# Patient Record
Sex: FEMALE | Race: BLACK OR AFRICAN AMERICAN | Employment: OTHER | URBAN - NONMETROPOLITAN AREA
[De-identification: names, ages, dates, MRNs, and addresses within clinical notes are randomized per-mention and may not be internally consistent; named-entity substitution may affect disease eponyms.]

---

## 2020-07-23 ENCOUNTER — TELEPHONE (OUTPATIENT)
Dept: FAMILY MEDICINE CLINIC | Age: 65
End: 2020-07-23

## 2020-07-23 DIAGNOSIS — D50.9 IRON DEFICIENCY ANEMIA, UNSPECIFIED IRON DEFICIENCY ANEMIA TYPE: ICD-10-CM

## 2020-07-23 DIAGNOSIS — Z79.01 ANTICOAGULANT LONG-TERM USE: Primary | ICD-10-CM

## 2020-07-30 DIAGNOSIS — Z79.01 ANTICOAGULANT LONG-TERM USE: ICD-10-CM

## 2020-07-30 DIAGNOSIS — D50.9 IRON DEFICIENCY ANEMIA, UNSPECIFIED IRON DEFICIENCY ANEMIA TYPE: ICD-10-CM

## 2020-08-06 ENCOUNTER — VIRTUAL VISIT (OUTPATIENT)
Dept: FAMILY MEDICINE CLINIC | Age: 65
End: 2020-08-06
Payer: MEDICARE

## 2020-08-06 DIAGNOSIS — J01.01 ACUTE RECURRENT MAXILLARY SINUSITIS: Primary | ICD-10-CM

## 2020-08-06 PROCEDURE — 99442 PR PHYS/QHP TELEPHONE EVALUATION 11-20 MIN: CPT | Performed by: FAMILY MEDICINE

## 2020-08-06 RX ORDER — AMIODARONE HYDROCHLORIDE 200 MG/1
200 TABLET ORAL DAILY
COMMUNITY
Start: 2019-02-22 | End: 2021-01-13

## 2020-08-06 NOTE — PROGRESS NOTES
Christiano Ernst presents today for   Chief Complaint   Patient presents with    Sinus Infection         Depression Screening:  No flowsheet data found. Learning Assessment:  Learning Assessment 4/17/2013   PRIMARY LEARNER Patient   BARRIERS PRIMARY LEARNER NONE   PRIMARY LANGUAGE ENGLISH   LEARNER PREFERENCE PRIMARY DEMONSTRATION   RELATIONSHIP SELF       Abuse Screening:  No flowsheet data found. Fall Risk  No flowsheet data found. Health Maintenance reviewed and discussed and ordered per Provider. Health Maintenance Due   Topic Date Due    Hepatitis C Screening  1955    Lipid Screen  07/03/1967    DTaP/Tdap/Td series (1 - Tdap) 07/03/1978    PAP AKA CERVICAL CYTOLOGY  07/03/1978    Shingrix Vaccine Age 50> (1 of 2) 07/03/2007    Breast Cancer Screen Mammogram  07/03/2007    FOBT Q1Y Age 50-75  07/03/2007    Pneumococcal 0-64 years (3 of 3 - PCV13) 11/13/2017    Medicare Yearly Exam  05/13/2020    Influenza Age 9 to Adult  08/01/2020   . Coordination of Care:  1. Have you been to the ER, urgent care clinic since your last visit? Hospitalized since your last visit? no    2. Have you seen or consulted any other health care providers outside of the [address] [place] [address] since your last visit? Include any pap smears or colon screening.  no      Last UDS Checked no  Last Pain contract signed: no

## 2020-08-06 NOTE — PROGRESS NOTES
Christiano Ersnt is a 63 y.o. female, evaluated via audio-only technology on 8/6/2020 for Sinus Infection  . Assessment & Plan:   Sinus infection:Zithromax will be provided with warm mist inhalations. No xray at telma time    12  Subjective:    is a 44-year-old female. The patient is a 44-year-old female who is seen today for evaluation. She is seen via telephone the patient is a 44-year-old female seen for evaluation today. she has nasal congertion with green drainage and a bad taste in the sputum. No cocumented fever treated I think for h pylori in the hospital. 15 min visit pt at home I was in my office    Prior to Admission medications    Medication Sig Start Date End Date Taking? Authorizing Provider   amiodarone (CORDARONE) 200 mg tablet Take 200 mg by mouth daily. 2/22/19  Yes Provider, Historical   ESTRACE 0.01 % (0.1 mg/gram) vaginal cream INSERT 2 GRAMS INTO VAGINA EVERY MONDAY AND THURSDAY 4/11/17  Yes Noni Morris MD   metoprolol tartrate (LOPRESSOR) 50 mg tablet TK 1 T PO BID UTD 1/28/17  Yes Provider, Historical   LORazepam (ATIVAN) 0.5 mg tablet 0.5 mg.   Yes Provider, Historical   dicyclomine (BENTYL) 10 mg capsule 10 mg. 1/17/14  Yes Provider, Historical   sodium bicarbonate 650 mg tablet TK 2 TS PO BID 12/21/16  Yes Provider, Historical   fluticasone (FLONASE) 50 mcg/actuation nasal spray 1 Spray. Yes Provider, Historical   calcitRIOL (ROCALTROL) 0.25 mcg capsule TAKE ONE CAPSULE PO QD 11/17/16  Yes Provider, Historical   EPINEPHrine (EPIPEN) 0.3 mg/0.3 mL injection 0.3 mg. 10/1/16  Yes Provider, Historical   ondansetron hcl (ZOFRAN) 8 mg tablet TK 1 T PO  Q 8 H PRN FOR 5 DAYS 12/21/16  Yes Provider, Historical   amLODIPine (NORVASC) 10 mg tablet TK 1 T PO QD 1/28/17  Yes Provider, Historical   metroNIDAZOLE (FLAGYL) 500 mg tablet TK 1 T PO  TID 12/15/16  Yes Provider, Historical   predniSONE (DELTASONE) 10 mg tablet Take  by mouth every fourty-eight (48) hours.    Yes Provider, Historical budesonide (PULMICORT) 0.5 mg/2 mL nebulizer suspension 500 mcg by Nebulization route two (2) times a day. Yes Provider, Historical   albuterol (PROVENTIL VENTOLIN) 2.5 mg /3 mL (0.083 %) nebulizer solution by Nebulization route as needed. Yes Provider, Historical   calcium carbonate (OS-CONNIE) 500 mg calcium (1,250 mg) tablet Take  by mouth two (2) times a day. Yes Provider, Historical   simvastatin (ZOCOR) 20 mg tablet Take  by mouth nightly. Yes Provider, Historical   azelastine (OPTIVAR) 0.05 % ophthalmic solution Administer  to both eyes two (2) times a day. Use in affected eye(s)    Yes Provider, Historical   omeprazole (PRILOSEC) 20 mg capsule Take 20 mg by mouth daily. Yes Provider, Historical   predniSONE (DELTASONE) 5 mg tablet  1/12/17   Provider, Historical   cranberry extract 425 mg cap 425 mg. 1/17/14   Provider, Historical   GENGRAF 25 mg capsule TK 4 CS QAM AND 3 CS QPM 1/16/17   Provider, Historical   mycophenolate mofetil (CELLCEPT) 250 mg capsule TK 4 CS PO BID 11/28/16   Provider, Historical   cloNIDine (CATAPRES) 0.2 mg/24 hr patch UNW AND FRENCH 1 PA TRANSDERMALLY ONCE A WEEK 1/11/17   Provider, Historical   verapamil ER (CALAN-SR) 180 mg CR tablet TK 1 T PO  QD 1/31/17   Provider, Historical   fluconazole (DIFLUCAN) 100 mg tablet TK 1 T PO  QD AROUNT THE CLOCK FOR 5 DAYS 12/8/16   Provider, Historical   levoFLOXacin (LEVAQUIN) 500 mg tablet TK 1 T PO QD 12/15/16   Provider, Historical   zolpidem (AMBIEN) 5 mg tablet TK 1 T PO QD HS 12/21/16   Provider, Historical   amoxicillin-clavulanate (AUGMENTIN) 875-125 mg per tablet TK 1 T PO  Q 12 H X 14 DAYS 1/23/17   Provider, Historical   VICODIN 5-300 mg tablet TK 1 T PO  Q 6 H PRN 2/4/17   Provider, Historical   nystatin (MYCOSTATIN) 100,000 unit/mL suspension TK 5 ML PO QID FOR 10 DAYS 12/8/16   Provider, Historical   Psyllium Husk-Aspartame (METAMUCIL FIBER SINGLES) 3.4 gram PwPk Take  by mouth daily.     Provider, Historical cycloSPORINE (SANDIMMUNE) 25 mg capsule Take 25 mg by mouth two (2) times a day. Provider, Historical   hyoscyamine SL (LEVSIN/SL) 0.125 mg SL tablet 0.125 mg by SubLINGual route every four (4) hours as needed. Provider, Historical   cyanocobalamin (VITAMIN B-12) 1,000 mcg tablet Take 1,000 mcg by mouth daily. Provider, Historical   fluticasone-salmeterol (ADVAIR DISKUS) 250-50 mcg/dose diskus inhaler Take 1 Puff by inhalation every twelve (12) hours. Provider, Historical   Fluticasone Furoate 27.5 mcg/actuation nasal spray 2 Sprays by Nasal route daily. Provider, Historical   montelukast (SINGULAIR) 10 mg tablet Take 10 mg by mouth daily. Provider, Historical   metoprolol (LOPRESSOR) 100 mg tablet Take  by mouth two (2) times a day. Provider, Historical     Patient Active Problem List   Diagnosis Code    History of kidney transplant Z94.0    CMV (cytomegalovirus) antibody positive R89.4    Hematuria R31.9    Fecal incontinence R15.9    Drug-induced hyperglycemia R73.9, T50.905A    Diverticulitis of intestine K57.92    Anemia D64.9    Acute renal failure ([facility] [address] N17.9    Asthma J45.909    Exacerbation of asthma J45. 0    Chronic kidney disease, stage III (moderate) (HCC) N18.3    Chronic obstructive pulmonary disease (HCC) J44.9    Cystic disease of kidney Q61.9    Diverticular disease of large intestine K57.30    Essential hypertension I10    Fever R50.9    Gastroenteritis K52.9    Gastroesophageal reflux disease K21.9    Hay fever J30.1    Hyperlipidemia E78.5    Hyperkalemia E87.5    Disease due to gram-negative bacillus B96.89    Kidney transplant rejection T86.11    Migraine without status migrainosus, not intractable G43.909    Nausea and vomiting R11.2    Adiposity E66.9    Pruritus L29.9    Recurrent urinary tract infection N39.0    Urinary tract infection N39.0    Renal transplant disorder T86.10    Systemic infection ([address] A41.9    Systemic inflammatory response syndrome (SIRS) ([facility]) R65.10    Ulcer [id number]    Renal cyst N28.1    Obesity E66.9    Migraine G43.909    Hypertension I10    Hypercholesteremia E78.00    GERD (gastroesophageal reflux disease) K21.9    Burning with urination R30.0    Arrhythmia I49.9       Review of Systems   Constitutional: Positive for malaise/fatigue. HENT: Positive for congestion and sinus pain. Eyes: Negative. Respiratory: Negative. Cardiovascular: Negative. Gastrointestinal: Negative. Genitourinary: Negative. Musculoskeletal: Positive for joint pain. Skin: Negative for itching and rash. Endo/Heme/Allergies: Negative. Patient-Reported Vitals 8/6/2020   Patient-Reported Weight 150   Patient-Reported Height 4'4\"        Lenny Russell, who was evaluated through a patient-initiated, synchronous (real-time) audio only encounter, and/or her healthcare decision maker, is aware that it is a billable service, with coverage as determined by her insurance carrier. She provided verbal consent to proceed: n/a- consent obtained within past 12 months. She has not had a related appointment within my department in the past 7 days or scheduled within the next 24 hours.       Total Time: minutes: 11-20 minutes    Mona Rivera MD

## 2020-08-07 ENCOUNTER — TELEPHONE (OUTPATIENT)
Dept: FAMILY MEDICINE CLINIC | Age: 65
End: 2020-08-07

## 2020-08-07 DIAGNOSIS — J01.11 ACUTE RECURRENT FRONTAL SINUSITIS: Primary | ICD-10-CM

## 2020-08-07 RX ORDER — AZITHROMYCIN 250 MG/1
TABLET, FILM COATED ORAL
Qty: 6 TAB | Refills: 0 | Status: SHIPPED | OUTPATIENT
Start: 2020-08-07 | End: 2020-08-12

## 2020-08-10 LAB — CREATININE, EXTERNAL: 7.7

## 2020-08-23 RX ORDER — SIMVASTATIN 20 MG/1
TABLET, FILM COATED ORAL
Qty: 90 TAB | Refills: 1 | Status: SHIPPED | OUTPATIENT
Start: 2020-08-23 | End: 2021-02-19

## 2020-08-28 DIAGNOSIS — N39.0 URINARY TRACT INFECTION WITHOUT HEMATURIA, SITE UNSPECIFIED: ICD-10-CM

## 2020-08-28 DIAGNOSIS — N30.00 ACUTE CYSTITIS WITHOUT HEMATURIA: ICD-10-CM

## 2020-08-28 DIAGNOSIS — N39.0 URINARY TRACT INFECTION WITHOUT HEMATURIA, SITE UNSPECIFIED: Primary | ICD-10-CM

## 2020-08-28 RX ORDER — NITROFURANTOIN 25; 75 MG/1; MG/1
100 CAPSULE ORAL 2 TIMES DAILY
Qty: 10 CAP | Refills: 0 | Status: SHIPPED | OUTPATIENT
Start: 2020-08-28 | End: 2020-09-17

## 2020-09-03 DIAGNOSIS — N30.00 ACUTE CYSTITIS WITHOUT HEMATURIA: ICD-10-CM

## 2020-09-17 ENCOUNTER — APPOINTMENT (OUTPATIENT)
Dept: GENERAL RADIOLOGY | Age: 65
End: 2020-09-17
Attending: EMERGENCY MEDICINE
Payer: MEDICARE

## 2020-09-17 ENCOUNTER — HOSPITAL ENCOUNTER (EMERGENCY)
Age: 65
Discharge: HOME | End: 2020-09-17
Attending: EMERGENCY MEDICINE
Payer: MEDICARE

## 2020-09-17 VITALS
RESPIRATION RATE: 17 BRPM | BODY MASS INDEX: 30.91 KG/M2 | DIASTOLIC BLOOD PRESSURE: 80 MMHG | TEMPERATURE: 99.6 F | HEART RATE: 75 BPM | SYSTOLIC BLOOD PRESSURE: 175 MMHG | WEIGHT: 168 LBS | OXYGEN SATURATION: 98 % | HEIGHT: 62 IN

## 2020-09-17 DIAGNOSIS — K21.9 GASTROESOPHAGEAL REFLUX DISEASE, ESOPHAGITIS PRESENCE NOT SPECIFIED: Primary | ICD-10-CM

## 2020-09-17 LAB
ALBUMIN SERPL-MCNC: 3.5 G/DL (ref 3.5–4.7)
ALBUMIN/GLOB SERPL: 1 {RATIO}
ALP SERPL-CCNC: 81 U/L (ref 38–126)
ALT SERPL-CCNC: 13 U/L (ref 3–52)
ANION GAP SERPL CALC-SCNC: 10 MMOL/L
AST SERPL W P-5'-P-CCNC: 18 U/L (ref 14–74)
ATRIAL RATE: 79 BPM
BASOPHILS # BLD: 0 K/UL
BASOPHILS NFR BLD: 0 %
BILIRUB SERPL-MCNC: 0.4 MG/DL (ref 0.2–1)
BUN SERPL-MCNC: 16 MG/DL (ref 9–21)
BUN/CREAT SERPL: 5
CA-I BLD-MCNC: 7.2 MG/DL (ref 8.5–10.5)
CALCULATED P AXIS, ECG09: 45 DEGREES
CALCULATED R AXIS, ECG10: 58 DEGREES
CALCULATED T AXIS, ECG11: 47 DEGREES
CHLORIDE SERPL-SCNC: 97 MMOL/L (ref 94–111)
CO2 SERPL-SCNC: 30 MMOL/L (ref 21–33)
CREAT SERPL-MCNC: 3.3 MG/DL (ref 0.7–1.2)
DIAGNOSIS, 93000: NORMAL
EOSINOPHIL # BLD: 0 K/UL
EOSINOPHIL NFR BLD: 0 %
ERYTHROCYTE [DISTWIDTH] IN BLOOD BY AUTOMATED COUNT: 15.9 % (ref 11.6–14.5)
GLOBULIN SER CALC-MCNC: 3.6 G/DL
GLUCOSE SERPL-MCNC: 114 MG/DL (ref 70–110)
HCT VFR BLD AUTO: 32.5 % (ref 35–45)
HGB BLD-MCNC: 9.9 % (ref 12–16)
IMM GRANULOCYTES # BLD AUTO: 0 K/UL
IMM GRANULOCYTES NFR BLD AUTO: 0 %
LIPASE SERPL-CCNC: 37 U/L (ref 10–57)
LYMPHOCYTES # BLD: 0.7 K/UL
LYMPHOCYTES NFR BLD: 12 %
MAGNESIUM SERPL-MCNC: 1.9 MG/DL (ref 1.7–2.8)
MCH RBC QN AUTO: 33.9 PG (ref 24–34)
MCHC RBC AUTO-ENTMCNC: 30.5 G/DL (ref 31–37)
MCV RBC AUTO: 111.3 FL (ref 74–97)
MONOCYTES # BLD: 0.5 K/UL
MONOCYTES NFR BLD: 8 %
NEUTS SEG # BLD: 4.7 K/UL
NEUTS SEG NFR BLD: 80 %
P-R INTERVAL, ECG05: 178 MS
PLATELET # BLD AUTO: 209 K/UL (ref 135–420)
PMV BLD AUTO: 10.2 FL
POTASSIUM SERPL-SCNC: 3.9 MMOL/L (ref 3.2–5.1)
PROT SERPL-MCNC: 7.1 G/DL (ref 6.1–8.4)
Q-T INTERVAL, ECG07: 453 MS
QRS DURATION, ECG06: 96 MS
QTC CALCULATION (BEZET), ECG08: 520 MS
RBC # BLD AUTO: 2.92 M/UL (ref 4.2–5.3)
RBC MORPH BLD: ABNORMAL
SODIUM SERPL-SCNC: 137 MMOL/L (ref 135–145)
TROPONIN I SERPL-MCNC: 0.04 NG/ML (ref 0.02–0.05)
TROPONIN I SERPL-MCNC: 0.04 NG/ML (ref 0.02–0.05)
VENTRICULAR RATE, ECG03: 79 BPM
WBC # BLD AUTO: 5.9 K/UL (ref 4.6–13.2)

## 2020-09-17 PROCEDURE — 96375 TX/PRO/DX INJ NEW DRUG ADDON: CPT

## 2020-09-17 PROCEDURE — 74011000250 HC RX REV CODE- 250: Performed by: EMERGENCY MEDICINE

## 2020-09-17 PROCEDURE — 83735 ASSAY OF MAGNESIUM: CPT

## 2020-09-17 PROCEDURE — 74022 RADEX COMPL AQT ABD SERIES: CPT

## 2020-09-17 PROCEDURE — 84484 ASSAY OF TROPONIN QUANT: CPT

## 2020-09-17 PROCEDURE — 85025 COMPLETE CBC W/AUTO DIFF WBC: CPT

## 2020-09-17 PROCEDURE — 74011250636 HC RX REV CODE- 250/636: Performed by: EMERGENCY MEDICINE

## 2020-09-17 PROCEDURE — 80053 COMPREHEN METABOLIC PANEL: CPT

## 2020-09-17 PROCEDURE — 96374 THER/PROPH/DIAG INJ IV PUSH: CPT

## 2020-09-17 PROCEDURE — 99285 EMERGENCY DEPT VISIT HI MDM: CPT

## 2020-09-17 PROCEDURE — 74011250637 HC RX REV CODE- 250/637

## 2020-09-17 PROCEDURE — 36415 COLL VENOUS BLD VENIPUNCTURE: CPT

## 2020-09-17 PROCEDURE — 83690 ASSAY OF LIPASE: CPT

## 2020-09-17 PROCEDURE — 93005 ELECTROCARDIOGRAM TRACING: CPT

## 2020-09-17 RX ORDER — ALUMINA, MAGNESIA, AND SIMETHICONE 2400; 2400; 240 MG/30ML; MG/30ML; MG/30ML
10 SUSPENSION ORAL
Qty: 250 ML | Refills: 0 | Status: SHIPPED | OUTPATIENT
Start: 2020-09-17

## 2020-09-17 RX ORDER — METOPROLOL TARTRATE 50 MG/1
50 TABLET ORAL DAILY
Status: ON HOLD | COMMUNITY
Start: 2019-02-22 | End: 2021-08-10

## 2020-09-17 RX ORDER — LIDOCAINE HYDROCHLORIDE 20 MG/ML
15 SOLUTION OROPHARYNGEAL
Status: COMPLETED | OUTPATIENT
Start: 2020-09-17 | End: 2020-09-17

## 2020-09-17 RX ORDER — OMEPRAZOLE 20 MG/1
20 CAPSULE, DELAYED RELEASE ORAL DAILY
Qty: 30 CAP | Refills: 0 | Status: SHIPPED | OUTPATIENT
Start: 2020-09-17 | End: 2020-09-22

## 2020-09-17 RX ORDER — MAG HYDROX/ALUMINUM HYD/SIMETH 200-200-20
SUSPENSION, ORAL (FINAL DOSE FORM) ORAL
Status: COMPLETED
Start: 2020-09-17 | End: 2020-09-17

## 2020-09-17 RX ORDER — ONDANSETRON 2 MG/ML
4 INJECTION INTRAMUSCULAR; INTRAVENOUS
Status: COMPLETED | OUTPATIENT
Start: 2020-09-17 | End: 2020-09-17

## 2020-09-17 RX ADMIN — FAMOTIDINE 20 MG: 10 INJECTION, SOLUTION INTRAVENOUS at 04:16

## 2020-09-17 RX ADMIN — LIDOCAINE HYDROCHLORIDE 15 ML: 20 SOLUTION ORAL; TOPICAL at 04:16

## 2020-09-17 RX ADMIN — ALUMINUM HYDROXIDE, MAGNESIUM HYDROXIDE, AND SIMETHICONE 30 ML: 200; 200; 20 SUSPENSION ORAL at 04:16

## 2020-09-17 RX ADMIN — ONDANSETRON 4 MG: 2 INJECTION INTRAMUSCULAR; INTRAVENOUS at 04:17

## 2020-09-17 NOTE — ED PROVIDER NOTES
EMERGENCY DEPARTMENT HISTORY AND PHYSICAL EXAM      Date: 9/17/2020  Patient Name: Ho Beard    History of Presenting Illness     Chief Complaint   Patient presents with    Chest Pain    Shortness of Breath       History Provided By: Patient    HPI: Ho Beard, 63 y.o. female with a past medical history significant 4 GERD, asthma,'s disease, arrhythmias presents to the ED with cc of Substernal burning chest pain for the past several hours. Patient states she had a full dialysis session today but during that session she began with epigastric burning pain radiating into her chest.  No nausea no vomiting. She did note some pain in her mid back. As well. No fevers no chills. No cough or congestion. No recent illness otherwise. There are no other complaints, changes, or physical findings at this time. PCP: Erin Mars MD    Current Outpatient Medications   Medication Sig Dispense Refill    metoprolol tartrate (LOPRESSOR) 50 mg tablet Take 50 mg by mouth daily.  aluminum & magnesium hydroxide-simethicone (Maalox Maximum Strength) 400-400-40 mg/5 mL suspension Take 10 mL by mouth every six (6) hours as needed for Indigestion. 250 mL 0    omeprazole (PriLOSEC) 20 mg capsule Take 1 Cap by mouth daily. 30 Cap 0    simvastatin (ZOCOR) 20 mg tablet TAKE 1 TABLET BY MOUTH DAILY AS DIRECTED. 90 Tab 1    amiodarone (CORDARONE) 200 mg tablet Take 200 mg by mouth daily.  cranberry extract 425 mg cap 425 mg.      fluticasone (FLONASE) 50 mcg/actuation nasal spray 1 Spray.  amLODIPine (NORVASC) 10 mg tablet TK 1 T PO QD  3    albuterol (PROVENTIL VENTOLIN) 2.5 mg /3 mL (0.083 %) nebulizer solution by Nebulization route as needed.  fluticasone-salmeterol (ADVAIR DISKUS) 250-50 mcg/dose diskus inhaler Take 1 Puff by inhalation every twelve (12) hours.  montelukast (SINGULAIR) 10 mg tablet Take 10 mg by mouth daily.         ESTRACE 0.01 % (0.1 mg/gram) vaginal cream INSERT 2 GRAMS INTO VAGINA EVERY MONDAY AND THURSDAY 42.5 g 5    LORazepam (ATIVAN) 0.5 mg tablet 0.5 mg.      dicyclomine (BENTYL) 10 mg capsule 10 mg.      EPINEPHrine (EPIPEN) 0.3 mg/0.3 mL injection 0.3 mg.      hyoscyamine SL (LEVSIN/SL) 0.125 mg SL tablet 0.125 mg by SubLINGual route every four (4) hours as needed. Past History     Past Medical History:  Past Medical History:   Diagnosis Date    Anemia NEC     Arrhythmia     Asthma     Asthma     Burning with urination     frequent uti    CMV (cytomegalovirus) antibody positive     Dyspepsia and other specified disorders of function of stomach     stomach ulcer    Essential hypertension     GERD (gastroesophageal reflux disease)     Hypercholesteremia     Hypertension     Migraine     Obesity     Renal cyst 2002    kidney transplant     Ulcer        Past Surgical History:  Past Surgical History:   Procedure Laterality Date    ENDOSCOPY, COLON, DIAGNOSTIC      with Dr. Donna Landers    HX GI      ulcer    HX HEENT      sinus surg    HX RENAL TRANSPLANT      VASCULAR SURGERY PROCEDURE UNLIST      shunt in prep for dialysis       Family History:  Family History   Problem Relation Age of Onset    Colon Polyps Brother        Social History:  Social History     Tobacco Use    Smoking status: Never Smoker    Smokeless tobacco: Never Used   Substance Use Topics    Alcohol use: No    Drug use: No       Allergies: Allergies   Allergen Reactions    Aspirin Unknown (comments) and Rash    Bactrim [Sulfamethoxazole-Trimethoprim] Unknown (comments) and Rash    Bromfenac Unknown (comments) and Rash    Ceftriaxone Rash    Ibuprofen Unknown (comments) and Rash    Ketorolac Tromethamine Unknown (comments) and Rash    Morphine Unknown (comments) and Rash    Relafen [Nabumetone] Unknown (comments) and Rash    Rifampin Unknown (comments) and Rash    Vancomycin Unknown (comments) and Rash     Pt reports causes itching, takes benadryl prior to use.  Pt denies anaphylaxis. Requires benadryl with each vancomycin dose         Review of Systems       Review of Systems   Constitutional: Negative for appetite change, fatigue and fever. HENT: Negative for congestion, ear pain, sinus pressure, sinus pain and sore throat. Eyes: Negative for redness and visual disturbance. Respiratory: Negative for cough, chest tightness and shortness of breath. Cardiovascular: Positive for chest pain. Negative for palpitations and leg swelling. Gastrointestinal: Positive for abdominal pain. Negative for diarrhea, nausea and vomiting. Genitourinary: Positive for difficulty urinating. Musculoskeletal: Negative for arthralgias, back pain, gait problem and myalgias. Skin: Negative for rash. Neurological: Negative for dizziness, speech difficulty, light-headedness, numbness and headaches. Psychiatric/Behavioral: Negative for suicidal ideas. The patient is not nervous/anxious. All other systems reviewed and are negative. Physical Exam       Physical Exam  Vitals signs and nursing note reviewed. Constitutional:       General: She is not in acute distress. Appearance: Normal appearance. She is well-developed. She is not ill-appearing. HENT:      Head: Normocephalic and atraumatic. Nose: Nose normal.      Mouth/Throat:      Mouth: Mucous membranes are moist.   Eyes:      Pupils: Pupils are equal, round, and reactive to light. Neck:      Musculoskeletal: Normal range of motion and neck supple. Cardiovascular:      Rate and Rhythm: Normal rate and regular rhythm. Pulmonary:      Effort: Pulmonary effort is normal.      Breath sounds: Normal breath sounds. Abdominal:      General: Abdomen is flat. Bowel sounds are normal.      Palpations: Abdomen is soft. Comments: Epigastric abdominal tenderness to palpation. Musculoskeletal: Normal range of motion. Skin:     General: Skin is warm and dry.       Capillary Refill: Capillary refill takes less than 2 seconds. Neurological:      General: No focal deficit present. Mental Status: She is alert. Psychiatric:         Mood and Affect: Mood normal.         Diagnostic Study Results     Labs -     Recent Results (from the past 12 hour(s))   CBC WITH AUTOMATED DIFF    Collection Time: 09/17/20  3:15 AM   Result Value Ref Range    WBC 5.9 4.6 - 13.2 K/uL    RBC 2.92 (L) 4.20 - 5.30 M/uL    HGB 9.9 (L) 12.0 - 16.0 %    HCT 32.5 (L) 35.0 - 45.0 %    .3 (H) 74.0 - 97.0 FL    MCH 33.9 24.0 - 34.0 PG    MCHC 30.5 (L) 31.0 - 37.0 g/dL    RDW 15.9 (H) 11.6 - 14.5 %    PLATELET 608 162 - 711 K/uL    MPV 10.2 FL    NEUTROPHILS PENDING %    LYMPHOCYTES PENDING %    MONOCYTES PENDING %    EOSINOPHILS PENDING %    BASOPHILS PENDING %    IMMATURE GRANULOCYTES PENDING %    ABS. NEUTROPHILS PENDING K/UL    ABS. LYMPHOCYTES PENDING K/UL    ABS. MONOCYTES PENDING K/UL    ABS. EOSINOPHILS PENDING K/UL    ABS. BASOPHILS PENDING K/UL    ABS. IMM. GRANS. PENDING K/UL    DF PENDING    METABOLIC PANEL, COMPREHENSIVE    Collection Time: 09/17/20  3:15 AM   Result Value Ref Range    Sodium 137 135 - 145 mmol/L    Potassium 3.9 3.2 - 5.1 mmol/L    Chloride 97 94 - 111 mmol/L    CO2 30 21 - 33 mmol/L    Anion gap 10 mmol/L    Glucose 114 (H) 70 - 110 mg/dL    BUN 16 9 - 21 mg/dL    Creatinine 3.30 (H) 0.70 - 1.20 mg/dL    BUN/Creatinine ratio 5      GFR est AA 17 ml/min/1.73m2    GFR est non-AA 14 ml/min/1.73m2    Calcium 7.2 (L) 8.5 - 10.5 mg/dL    Bilirubin, total 0.4 0.2 - 1.0 mg/dL    AST (SGOT) 18 14 - 74 U/L    ALT (SGPT) 13 3 - 52 U/L    Alk.  phosphatase 81 38 - 126 U/L    Protein, total 7.1 6.1 - 8.4 g/dL    Albumin 3.5 3.5 - 4.7 g/dL    Globulin 3.6 g/dL    A-G Ratio 1.0     LIPASE    Collection Time: 09/17/20  3:15 AM   Result Value Ref Range    Lipase 37 10 - 57 U/L       Radiologic Studies -   [unfilled]  CT Results  (Last 48 hours)    None        CXR Results  (Last 48 hours)    None          Medical Decision Making and ED Course   I am the first provider for this patient. I reviewed the vital signs, available nursing notes, past medical history, past surgical history, family history and social history. Vital Signs-Reviewed the patient's vital signs. Patient Vitals for the past 12 hrs:   Temp Pulse Resp BP SpO2   09/17/20 0344  75 18 (!) 159/77 98 %   09/17/20 0325  80 20 (!) 156/81 97 %   09/17/20 0307 99.6 °F (37.6 °C) 79 22 (!) 152/74 98 %       EKG interpretation: (Preliminary)  Rhythm: normal sinus rhythm; and regular . Rate (approx.): 79; Axis: normal; ND interval: normal; QRS interval: normal ; ST/T wave: normal; Other findings: borderline ekg. Records Reviewed: Nursing Notes    Provider Notes (Medical Decision Making):   Patient is a 62-year-old female with a history of end-stage renal disease presenting with chest pain that started while she was at dialysis it resolved but then came back around midnight last night. Lasted for several hours and brought her into the emergency department tonight. Pain is described as a substernal burning pressure and is mostly relieved with a GI cocktail. Suspect this is a relation to patient's GERD will plan to check 2 sets of cardiac enzymes. And if stable discharge to home    The patient presents with chest pain with a differential diagnosis of  ACS, acute MI, pulmonary edema/CHF, angina, dyspnea, GERD and pericarditis    ED Course:   Initial assessment performed. The patients presenting problems have been discussed, and they are in agreement with the care plan formulated and outlined with them. I have encouraged them to ask questions as they arise throughout their visit. ED Course as of Sep 17 0640   Thu Sep 17, 2020   0602 Pt still feeling much improved after GI cocktail. Initial labs still pending. Have called lab regarding results and informed they are \"running\".  Suspect troponin may be elevated given her renal impairment so will plan to check second set. []   0614 Glucose(!): 114 [MC]   0614 Creatinine(!): 3.30 []   0614 BUN: 16 [MC]   0614 Calcium(!): 7.2 []   0614 BUN/Creatinine ratio: 5 []   0631 Lipase: 37 []   3838 METABOLIC PANEL, COMPREHENSIVE(!):    Sodium 137   Potassium 3.9   Chloride 97   CO2 30   Anion gap 10   Glucose 114(!)   BUN 16   Creatinine 3.30(!)   BUN/Creatinine ratio 5   GFR est AA 17   GFR est non-AA 14   Calcium 7.2(!)   Bilirubin, total 0.4   AST 18   ALT 13   Alk. phosphatase 81   Protein, total 7.1   Albumin 3.5   Globulin 3.6   A-G Ratio 1.0 []   0631 CBC WITH AUTOMATED DIFF(!):    WBC 5.9   RBC 2.92(!)   HGB 9.9(!)   HCT 32.5(!)   .3(!)   MCH 33.9   MCHC 30.5(!)   RDW 15.9(!)   PLATELET 411   MPV 75.7   NEUTROPHILS PENDING   LYMPHOCYTES PENDING   MONOCYTES PENDING   EOSINOPHILS PENDING   BASOPHILS PENDING   IMMATURE GRANULOCYTES PENDING   ABS. NEUTROPHILS PENDING   ABS. LYMPHOCYTES PENDING   ABS. MONOCYTES PENDING   ABS. EOSINOPHILS PENDING   ABS. BASOPHILS PENDING   ABS. IMM. GRANS. PENDING   DF PENDING []   0631 HGB(!): 9.9 [MC]   0631 HCT(!): 32.5 [MC]   0631 MCHC(!): 30.5 []   0631 RDW(!): 15.9 []   0636 Troponin-I, Qt.: 0.04 [MC]      ED Course User Index  [] Cristiano Acevedo MD     Initial troponin negative.   7:00AM  Pt care transitioned to Dr. Caden Oconnor pending repeat troponin. If negative will plan for discharge and outpatient followup.    7:50 AM  Signed out by Dr. Nguyen Gave; gradual epigastric burning pain which she has had in the past and has had EGD showing GERD; pain is similar; no radiation but associated with sob; nausea. Improved with antacids. No fever; chills; hemoptysis; no h/o CAD/MI. States that she had a cardiology evaluation this year in [place] that was negative. VSS; NAD; alert ox3; lungs clear; Heart RRR; abd soft nontender. EKG w nsr 79; no ST changes; QTc 520. 2nd trop negative. Pt has h/o AF and prolonged QTc related to amiodarone.  OK to DC    Disposition Discharge to home follow-up with primary care doctor. Return to the ED for any concerns or deterioration    DISCHARGE PLAN:  1. Current Discharge Medication List      CONTINUE these medications which have NOT CHANGED    Details   metoprolol tartrate (LOPRESSOR) 50 mg tablet Take 50 mg by mouth daily. simvastatin (ZOCOR) 20 mg tablet TAKE 1 TABLET BY MOUTH DAILY AS DIRECTED. Qty: 90 Tab, Refills: 1      amiodarone (CORDARONE) 200 mg tablet Take 200 mg by mouth daily. cranberry extract 425 mg cap 425 mg.      fluticasone (FLONASE) 50 mcg/actuation nasal spray 1 Spray. amLODIPine (NORVASC) 10 mg tablet TK 1 T PO QD  Refills: 3      albuterol (PROVENTIL VENTOLIN) 2.5 mg /3 mL (0.083 %) nebulizer solution by Nebulization route as needed. fluticasone-salmeterol (ADVAIR DISKUS) 250-50 mcg/dose diskus inhaler Take 1 Puff by inhalation every twelve (12) hours. montelukast (SINGULAIR) 10 mg tablet Take 10 mg by mouth daily. omeprazole (PRILOSEC) 20 mg capsule Take 20 mg by mouth daily. ESTRACE 0.01 % (0.1 mg/gram) vaginal cream INSERT 2 GRAMS INTO VAGINA EVERY MONDAY AND THURSDAY  Qty: 42.5 g, Refills: 5      GENGRAF 25 mg capsule TK 4 CS QAM AND 3 CS QPM  Refills: 3      LORazepam (ATIVAN) 0.5 mg tablet 0.5 mg.      dicyclomine (BENTYL) 10 mg capsule 10 mg.      mycophenolate mofetil (CELLCEPT) 250 mg capsule TK 4 CS PO BID  Refills: 0      EPINEPHrine (EPIPEN) 0.3 mg/0.3 mL injection 0.3 mg.      cycloSPORINE (SANDIMMUNE) 25 mg capsule Take 25 mg by mouth two (2) times a day. hyoscyamine SL (LEVSIN/SL) 0.125 mg SL tablet 0.125 mg by SubLINGual route every four (4) hours as needed.            2.   Follow-up Information     Follow up With Specialties Details Why Teri Wong MD Family Medicine Schedule an appointment as soon as possible for a visit in 3 days For followup and recheck of todays symptoms [place] [id number]  [phone]      [facility] EMERGENCY DEPT Emergency Medicine Go to  As needed, or for any concerns or deteriorations. , if symptoms persist or worsen. [address]  [phone]        3. Return to ED if worse     Diagnosis     Clinical Impression:   1. Gastroesophageal reflux disease, esophagitis presence not specified        Attestations:    Taye Tellez MD    Please note that this dictation was completed with [employer], the computer voice recognition software. Quite often unanticipated grammatical, syntax, homophones, and other interpretive errors are inadvertently transcribed by the computer software. Please disregard these errors. Please excuse any errors that have escaped final proofreading. Thank you.

## 2020-09-17 NOTE — ED NOTES
Bedside and Verbal shift change report given to Bakari Gordon (oncoming nurse) by Claudio Agarwal (offgoing nurse). Report included the following information SBAR, Kardex, ED Summary, [facility] and Recent Results.

## 2020-09-17 NOTE — ED NOTES
Patient is on dialysis with failed kidney transplant from 2002. Fistula in LUE with + bruit. Patient states she still urinates normally.

## 2020-09-22 RX ORDER — OMEPRAZOLE 20 MG/1
CAPSULE, DELAYED RELEASE ORAL
Qty: 180 CAP | Refills: 2 | Status: SHIPPED | OUTPATIENT
Start: 2020-09-22 | End: 2021-07-14

## 2020-09-22 RX ORDER — AMLODIPINE BESYLATE 10 MG/1
TABLET ORAL
Qty: 90 TAB | Refills: 3 | Status: SHIPPED | OUTPATIENT
Start: 2020-09-22 | End: 2021-07-14

## 2020-10-01 ENCOUNTER — TELEPHONE (OUTPATIENT)
Dept: FAMILY MEDICINE CLINIC | Age: 65
End: 2020-10-01

## 2020-10-01 ENCOUNTER — OFFICE VISIT (OUTPATIENT)
Dept: FAMILY MEDICINE CLINIC | Age: 65
End: 2020-10-01
Payer: MEDICARE

## 2020-10-01 DIAGNOSIS — R31.9 URINARY TRACT INFECTION WITH HEMATURIA, SITE UNSPECIFIED: Primary | ICD-10-CM

## 2020-10-01 DIAGNOSIS — N39.0 URINARY TRACT INFECTION WITH HEMATURIA, SITE UNSPECIFIED: Primary | ICD-10-CM

## 2020-10-01 DIAGNOSIS — Z94.0 HISTORY OF KIDNEY TRANSPLANT: ICD-10-CM

## 2020-10-01 DIAGNOSIS — N39.0 URINARY TRACT INFECTION WITHOUT HEMATURIA, SITE UNSPECIFIED: Primary | ICD-10-CM

## 2020-10-01 PROCEDURE — 99442 PR PHYS/QHP TELEPHONE EVALUATION 11-20 MIN: CPT | Performed by: FAMILY MEDICINE

## 2020-10-01 RX ORDER — CEPHALEXIN 500 MG/1
500 CAPSULE ORAL 3 TIMES DAILY
Qty: 30 CAP | Refills: 0 | Status: SHIPPED | OUTPATIENT
Start: 2020-10-01 | End: 2020-10-11

## 2020-10-01 NOTE — PROGRESS NOTES
Kenny Landa is a 63 y.o. female, evaluated via audio-only technology on 10/1/2020 for No chief complaint on file. .    Assessment & Plan: GI, status post renal transplant: The patient is seen by virtual telephone to telephone visit. It was a 15-minute visit recently discharged from [facility] hospital treated with a urinary tract infection but only given Keflex for 4 doses. Eating poorly. No fever or chills. Bowel movements have been appropriate. I am going to refill the cephalexin which she should have been taking at the time of discharge but she was not given enough medicine she did not even know she had a poor appointment today until we called her back. Prescription and will follow-up on an as-needed basis was a 15-minute visit the patient was at her home I was in my office I actually call the patient because she was on our schedule and she did not know it.        12  Subjective: Is seen for evaluation by phone to phone evaluation. She was discharged from the hospital at [facility] recently she has had some urinary infection she was given only 4 tablets of Keflex apparently an appointment was set up but she did not know about it she is not been eating well she has had no falls or injuries. She has had no rash no syncope or loss of consciousness she had a urinary infection but they did not tell her much about it and I have stressed to the patient and told her that she needed to know exactly why she was in the hospital every time she was in the antibiotic and culture etc.  Is with family. She has had no falls or injuries she sleeps relatively well. Prior to Admission medications    Medication Sig Start Date End Date Taking? Authorizing Provider   cephALEXin (KEFLEX) 500 mg capsule Take 1 Cap by mouth three (3) times daily for 10 days.  10/1/20 10/11/20  Yenni Santoro MD   amLODIPine (NORVASC) 10 mg tablet TAKE 1 TABLET BY MOUTH EVERY DAY 9/22/20   Yenni Santoro MD   omeprazole (PRILOSEC) 20 mg capsule TAKE ONE CAPSULE BY MOUTH TWICE DAILY 9/22/20   Bartolome Brunson MD   metoprolol tartrate (LOPRESSOR) 50 mg tablet Take 50 mg by mouth daily. 2/22/19   Abelardo Yepez MD   aluminum & magnesium hydroxide-simethicone (Maalox Maximum Strength) 400-400-40 mg/5 mL suspension Take 10 mL by mouth every six (6) hours as needed for Indigestion. 9/17/20   Jimi Roe MD   simvastatin (ZOCOR) 20 mg tablet TAKE 1 TABLET BY MOUTH DAILY AS DIRECTED. 8/23/20   Bartolome Brunson MD   amiodarone (CORDARONE) 200 mg tablet Take 200 mg by mouth daily. 2/22/19   Provider, Historical   ESTRACE 0.01 % (0.1 mg/gram) vaginal cream INSERT 2 GRAMS INTO VAGINA EVERY MONDAY AND THURSDAY 4/11/17   Josh Stringer MD   cranberry extract 425 mg cap 425 mg. 1/17/14   Provider, Historical   LORazepam (ATIVAN) 0.5 mg tablet 0.5 mg.    Provider, Historical   dicyclomine (BENTYL) 10 mg capsule 10 mg. 1/17/14   Provider, Historical   fluticasone (FLONASE) 50 mcg/actuation nasal spray 1 Spray. Provider, Historical   EPINEPHrine (EPIPEN) 0.3 mg/0.3 mL injection 0.3 mg. 10/1/16   Provider, Historical   hyoscyamine SL (LEVSIN/SL) 0.125 mg SL tablet 0.125 mg by SubLINGual route every four (4) hours as needed. Provider, Historical   albuterol (PROVENTIL VENTOLIN) 2.5 mg /3 mL (0.083 %) nebulizer solution by Nebulization route as needed. Provider, Historical   fluticasone-salmeterol (ADVAIR DISKUS) 250-50 mcg/dose diskus inhaler Take 1 Puff by inhalation every twelve (12) hours. Provider, Historical   montelukast (SINGULAIR) 10 mg tablet Take 10 mg by mouth daily.       Provider, Historical     Patient Active Problem List   Diagnosis Code    History of kidney transplant Z94.0    CMV (cytomegalovirus) antibody positive R89.4    Hematuria R31.9    Fecal incontinence R15.9    Drug-induced hyperglycemia R73.9, T50.905A    Diverticulitis of intestine K57.92    Anemia D64.9    Acute renal failure ([facility] [address] N17.9    Asthma J45.909    Exacerbation of asthma J45. 901    Chronic kidney disease, stage III (moderate) N18.30    Chronic obstructive pulmonary disease (HCC) J44.9    Cystic disease of kidney Q61.9    Diverticular disease of large intestine K57.30    Essential hypertension I10    Fever R50.9    Gastroenteritis K52.9    Gastroesophageal reflux disease K21.9    Hay fever J30.1    Hyperlipidemia E78.5    Hyperkalemia E87.5    Disease due to gram-negative bacillus B96.89    Kidney transplant rejection T86.11    Migraine without status migrainosus, not intractable G43.909    Nausea and vomiting R11.2    Adiposity E66.9    Pruritus L29.9    Recurrent urinary tract infection N39.0    Urinary tract infection N39.0    Renal transplant disorder T86.10    Systemic infection (HCC) A41.9    Systemic inflammatory response syndrome (SIRS) (HCC) R65.10    Ulcer [id number]    Renal cyst N28.1    Obesity E66.9    Migraine G43.909    Hypertension I10    Hypercholesteremia E78.00    GERD (gastroesophageal reflux disease) K21.9    Burning with urination R30.0    Arrhythmia I49.9    Acute recurrent maxillary sinusitis J01.01       Review of Systems   Constitutional: Positive for malaise/fatigue. HENT: Negative. Eyes: Negative. Respiratory: Negative. Cardiovascular: Negative. Gastrointestinal: Negative. Genitourinary: Negative. Musculoskeletal: Positive for back pain. Neurological: Negative. Endo/Heme/Allergies: Negative. Psychiatric/Behavioral: Negative. Patient-Reported Vitals 8/6/2020   Patient-Reported Weight 150   Patient-Reported Height 8'6\"        Devaughn Valentine, who was evaluated through a patient-initiated, synchronous (real-time) audio only encounter, and/or her healthcare decision maker, is aware that it is a billable service, with coverage as determined by her insurance carrier. She provided verbal consent to proceed: n/a- consent obtained within past 12 months.  She has not had a related appointment within my department in the past 7 days or scheduled within the next 24 hours.       Total Time: minutes: 11-20 minutes    Ollie Lara MD

## 2020-10-01 NOTE — PROGRESS NOTES
Arnetta Lennox presents today for No chief complaint on file. Depression Screening:  3 most recent PHQ Screens 8/6/2020   Little interest or pleasure in doing things Not at all   Feeling down, depressed, irritable, or hopeless Not at all   Total Score PHQ 2 0       Learning Assessment:  Learning Assessment 4/17/2013   PRIMARY LEARNER Patient   BARRIERS PRIMARY LEARNER NONE   PRIMARY LANGUAGE ENGLISH   LEARNER PREFERENCE PRIMARY DEMONSTRATION   RELATIONSHIP SELF       Abuse Screening:  No flowsheet data found. Fall Risk  No flowsheet data found. ADL  No flowsheet data found. Health Maintenance reviewed and discussed and ordered per Provider. Health Maintenance Due   Topic Date Due    Hepatitis C Screening  1955    Lipid Screen  07/03/1967    DTaP/Tdap/Td series (1 - Tdap) 07/03/1978    PAP AKA CERVICAL CYTOLOGY  07/03/1978    Shingrix Vaccine Age 50> (1 of 2) 07/03/2007    Breast Cancer Screen Mammogram  07/03/2007    FOBT Q1Y Age 50-75  07/03/2007    Pneumococcal 0-64 years (3 of 3 - PCV13) 11/13/2017    Medicare Yearly Exam  05/13/2020    Flu Vaccine (1) 09/01/2020   . Coordination of Care:  1. Have you been to the ER, urgent care clinic since your last visit? Hospitalized since your last visit? yes    2. Have you seen or consulted any other health care providers outside of the [address] [place] [address] since your last visit? Include any pap smears or colon screening.  yes    Providers orders his own labs, orders for colonoscopy, mammograms and referrals as needed

## 2020-10-02 ENCOUNTER — HOSPITAL ENCOUNTER (OUTPATIENT)
Dept: LAB | Age: 65
Discharge: HOME | End: 2020-10-02
Payer: MEDICARE

## 2020-10-02 LAB
BASOPHILS # BLD: 0 K/UL (ref 0–0.1)
BASOPHILS NFR BLD: 1 % (ref 0–2)
EOSINOPHIL # BLD: 0.2 K/UL (ref 0–0.4)
EOSINOPHIL NFR BLD: 2 % (ref 0–5)
ERYTHROCYTE [DISTWIDTH] IN BLOOD BY AUTOMATED COUNT: 15.1 % (ref 11.6–14.5)
HCT VFR BLD AUTO: 33.8 % (ref 35–45)
HGB BLD-MCNC: 10.8 G/DL (ref 12–16)
IMM GRANULOCYTES # BLD AUTO: 0 K/UL
IMM GRANULOCYTES NFR BLD AUTO: 0 %
INR PPP: 2.6
LYMPHOCYTES # BLD: 2.1 K/UL (ref 0.9–3.6)
LYMPHOCYTES NFR BLD: 30 % (ref 21–52)
MCH RBC QN AUTO: 34.3 PG (ref 24–34)
MCHC RBC AUTO-ENTMCNC: 32 G/DL (ref 31–37)
MCV RBC AUTO: 107.3 FL (ref 74–97)
MONOCYTES # BLD: 0.5 K/UL (ref 0.05–1.2)
MONOCYTES NFR BLD: 7 % (ref 3–10)
NEUTS SEG # BLD: 4 K/UL (ref 1.8–8)
NEUTS SEG NFR BLD: 60 % (ref 40–73)
PLATELET # BLD AUTO: 348 K/UL (ref 135–420)
PMV BLD AUTO: 9.4 FL (ref 9.2–11.8)
PROTHROMBIN TIME: 27.3 SEC (ref 11.5–13.9)
RBC # BLD AUTO: 3.15 M/UL (ref 4.2–5.3)
WBC # BLD AUTO: 6.8 K/UL (ref 4.6–13.2)

## 2020-10-02 PROCEDURE — 85610 PROTHROMBIN TIME: CPT

## 2020-10-02 PROCEDURE — 36415 COLL VENOUS BLD VENIPUNCTURE: CPT

## 2020-10-02 PROCEDURE — 85025 COMPLETE CBC W/AUTO DIFF WBC: CPT

## 2020-10-04 ENCOUNTER — HOSPITAL ENCOUNTER (EMERGENCY)
Age: 65
Discharge: HOME | End: 2020-10-04
Attending: FAMILY MEDICINE | Admitting: FAMILY MEDICINE
Payer: MEDICARE

## 2020-10-04 ENCOUNTER — APPOINTMENT (OUTPATIENT)
Dept: CT IMAGING | Age: 65
End: 2020-10-04
Attending: FAMILY MEDICINE
Payer: MEDICARE

## 2020-10-04 VITALS
WEIGHT: 160 LBS | OXYGEN SATURATION: 98 % | BODY MASS INDEX: 29.44 KG/M2 | SYSTOLIC BLOOD PRESSURE: 138 MMHG | TEMPERATURE: 97.5 F | HEIGHT: 62 IN | DIASTOLIC BLOOD PRESSURE: 72 MMHG | RESPIRATION RATE: 18 BRPM | HEART RATE: 68 BPM

## 2020-10-04 DIAGNOSIS — K80.20 GALLSTONES: Primary | ICD-10-CM

## 2020-10-04 DIAGNOSIS — R11.2 NON-INTRACTABLE VOMITING WITH NAUSEA, UNSPECIFIED VOMITING TYPE: ICD-10-CM

## 2020-10-04 LAB
ALBUMIN SERPL-MCNC: 3.8 G/DL (ref 3.5–4.7)
ALBUMIN/GLOB SERPL: 1 {RATIO}
ALP SERPL-CCNC: 99 U/L (ref 38–126)
ALT SERPL-CCNC: 17 U/L (ref 3–52)
ANION GAP SERPL CALC-SCNC: 11 MMOL/L
AST SERPL W P-5'-P-CCNC: 27 U/L (ref 14–74)
BASOPHILS # BLD: 0 K/UL (ref 0–0.1)
BASOPHILS NFR BLD: 0 % (ref 0–2)
BILIRUB SERPL-MCNC: 0.5 MG/DL (ref 0.2–1)
BUN SERPL-MCNC: 17 MG/DL (ref 9–21)
BUN/CREAT SERPL: 4
CA-I BLD-MCNC: 8.2 MG/DL (ref 8.5–10.5)
CHLORIDE SERPL-SCNC: 101 MMOL/L (ref 94–111)
CO2 SERPL-SCNC: 26 MMOL/L (ref 21–33)
CREAT SERPL-MCNC: 4.8 MG/DL (ref 0.7–1.2)
EOSINOPHIL # BLD: 0.2 K/UL (ref 0–0.4)
EOSINOPHIL NFR BLD: 1 % (ref 0–5)
ERYTHROCYTE [DISTWIDTH] IN BLOOD BY AUTOMATED COUNT: 15.4 % (ref 11.6–14.5)
GLOBULIN SER CALC-MCNC: 3.7 G/DL
GLUCOSE SERPL-MCNC: 120 MG/DL (ref 70–110)
HCT VFR BLD AUTO: 35.3 % (ref 35–45)
HGB BLD-MCNC: 11 G/DL (ref 12–16)
IMM GRANULOCYTES # BLD AUTO: 0 K/UL
IMM GRANULOCYTES NFR BLD AUTO: 0 %
LIPASE SERPL-CCNC: 56 U/L (ref 10–57)
LYMPHOCYTES # BLD: 1.6 K/UL (ref 0.9–3.6)
LYMPHOCYTES NFR BLD: 15 % (ref 21–52)
MCH RBC QN AUTO: 34 PG (ref 24–34)
MCHC RBC AUTO-ENTMCNC: 31.2 G/DL (ref 31–37)
MCV RBC AUTO: 109 FL (ref 74–97)
MONOCYTES # BLD: 0.5 K/UL (ref 0.05–1.2)
MONOCYTES NFR BLD: 5 % (ref 3–10)
NEUTS SEG # BLD: 8.6 K/UL (ref 1.8–8)
NEUTS SEG NFR BLD: 79 % (ref 40–73)
PLATELET # BLD AUTO: 333 K/UL (ref 135–420)
PMV BLD AUTO: 9.2 FL (ref 9.2–11.8)
POTASSIUM SERPL-SCNC: 3.1 MMOL/L (ref 3.2–5.1)
PROT SERPL-MCNC: 7.5 G/DL (ref 6.1–8.4)
RBC # BLD AUTO: 3.24 M/UL (ref 4.2–5.3)
SODIUM SERPL-SCNC: 138 MMOL/L (ref 135–145)
TROPONIN I SERPL-MCNC: <0.05 NG/ML (ref 0.02–0.05)
WBC # BLD AUTO: 10.9 K/UL (ref 4.6–13.2)

## 2020-10-04 PROCEDURE — 96376 TX/PRO/DX INJ SAME DRUG ADON: CPT

## 2020-10-04 PROCEDURE — 74011000258 HC RX REV CODE- 258: Performed by: FAMILY MEDICINE

## 2020-10-04 PROCEDURE — 36415 COLL VENOUS BLD VENIPUNCTURE: CPT

## 2020-10-04 PROCEDURE — 74011250637 HC RX REV CODE- 250/637: Performed by: FAMILY MEDICINE

## 2020-10-04 PROCEDURE — 83690 ASSAY OF LIPASE: CPT

## 2020-10-04 PROCEDURE — 74176 CT ABD & PELVIS W/O CONTRAST: CPT

## 2020-10-04 PROCEDURE — 84484 ASSAY OF TROPONIN QUANT: CPT

## 2020-10-04 PROCEDURE — 74011250636 HC RX REV CODE- 250/636: Performed by: FAMILY MEDICINE

## 2020-10-04 PROCEDURE — 85025 COMPLETE CBC W/AUTO DIFF WBC: CPT

## 2020-10-04 PROCEDURE — 99285 EMERGENCY DEPT VISIT HI MDM: CPT

## 2020-10-04 PROCEDURE — 96375 TX/PRO/DX INJ NEW DRUG ADDON: CPT

## 2020-10-04 PROCEDURE — 93005 ELECTROCARDIOGRAM TRACING: CPT

## 2020-10-04 PROCEDURE — 96365 THER/PROPH/DIAG IV INF INIT: CPT

## 2020-10-04 PROCEDURE — 80053 COMPREHEN METABOLIC PANEL: CPT

## 2020-10-04 PROCEDURE — 74011000258 HC RX REV CODE- 258

## 2020-10-04 PROCEDURE — 74011250636 HC RX REV CODE- 250/636

## 2020-10-04 RX ORDER — SODIUM CHLORIDE 9 MG/ML
INJECTION, SOLUTION INTRAVENOUS
Status: COMPLETED
Start: 2020-10-04 | End: 2020-10-04

## 2020-10-04 RX ORDER — ONDANSETRON 4 MG/1
4 TABLET, ORALLY DISINTEGRATING ORAL
Qty: 10 TAB | Refills: 0 | Status: SHIPPED | OUTPATIENT
Start: 2020-10-04 | End: 2020-10-09

## 2020-10-04 RX ORDER — POTASSIUM CHLORIDE 750 MG/1
40 TABLET, EXTENDED RELEASE ORAL
Status: COMPLETED | OUTPATIENT
Start: 2020-10-04 | End: 2020-10-04

## 2020-10-04 RX ORDER — ONDANSETRON 2 MG/ML
4 INJECTION INTRAMUSCULAR; INTRAVENOUS ONCE
Status: COMPLETED | OUTPATIENT
Start: 2020-10-04 | End: 2020-10-04

## 2020-10-04 RX ORDER — ONDANSETRON 4 MG/1
TABLET, ORALLY DISINTEGRATING ORAL
Status: DISCONTINUED
Start: 2020-10-04 | End: 2020-10-04 | Stop reason: HOSPADM

## 2020-10-04 RX ORDER — HYDROMORPHONE HYDROCHLORIDE 1 MG/ML
INJECTION, SOLUTION INTRAMUSCULAR; INTRAVENOUS; SUBCUTANEOUS
Status: COMPLETED
Start: 2020-10-04 | End: 2020-10-04

## 2020-10-04 RX ORDER — POTASSIUM CHLORIDE 7.45 MG/ML
10 INJECTION INTRAVENOUS ONCE
Status: DISCONTINUED | OUTPATIENT
Start: 2020-10-04 | End: 2020-10-04

## 2020-10-04 RX ORDER — PROMETHAZINE HYDROCHLORIDE 25 MG/ML
INJECTION, SOLUTION INTRAMUSCULAR; INTRAVENOUS
Status: COMPLETED
Start: 2020-10-04 | End: 2020-10-04

## 2020-10-04 RX ORDER — HYDROMORPHONE HYDROCHLORIDE 1 MG/ML
1 INJECTION, SOLUTION INTRAMUSCULAR; INTRAVENOUS; SUBCUTANEOUS ONCE
Status: COMPLETED | OUTPATIENT
Start: 2020-10-04 | End: 2020-10-04

## 2020-10-04 RX ORDER — WARFARIN 2 MG/1
4 TABLET ORAL DAILY
Status: ON HOLD | COMMUNITY
Start: 2020-09-24 | End: 2020-12-29 | Stop reason: SDUPTHER

## 2020-10-04 RX ORDER — ONDANSETRON 4 MG/1
4 TABLET, ORALLY DISINTEGRATING ORAL
Status: COMPLETED | OUTPATIENT
Start: 2020-10-04 | End: 2020-10-04

## 2020-10-04 RX ADMIN — HYDROMORPHONE HYDROCHLORIDE 1 MG: 1 INJECTION, SOLUTION INTRAMUSCULAR; INTRAVENOUS; SUBCUTANEOUS at 13:07

## 2020-10-04 RX ADMIN — ONDANSETRON 4 MG: 2 INJECTION INTRAMUSCULAR; INTRAVENOUS at 10:41

## 2020-10-04 RX ADMIN — ONDANSETRON 4 MG: 4 TABLET, ORALLY DISINTEGRATING ORAL at 10:37

## 2020-10-04 RX ADMIN — SODIUM CHLORIDE 500 ML: 9 INJECTION, SOLUTION INTRAVENOUS at 12:35

## 2020-10-04 RX ADMIN — HYDROMORPHONE HYDROCHLORIDE 1 MG: 1 INJECTION, SOLUTION INTRAMUSCULAR; INTRAVENOUS; SUBCUTANEOUS at 10:41

## 2020-10-04 RX ADMIN — PROMETHAZINE HYDROCHLORIDE 25 MG: 25 INJECTION INTRAMUSCULAR; INTRAVENOUS at 13:08

## 2020-10-04 RX ADMIN — SODIUM CHLORIDE: 450 INJECTION INTRAVENOUS at 13:04

## 2020-10-04 RX ADMIN — PROMETHAZINE HYDROCHLORIDE: 25 INJECTION INTRAMUSCULAR; INTRAVENOUS at 13:03

## 2020-10-04 RX ADMIN — POTASSIUM CHLORIDE 40 MEQ: 750 TABLET, EXTENDED RELEASE ORAL at 12:34

## 2020-10-04 NOTE — ED NOTES
Medications pulled under override, [facility] pharmacy aware that medications will not link to original orders.

## 2020-10-04 NOTE — ED PROVIDER NOTES
EMERGENCY DEPARTMENT HISTORY AND PHYSICAL EXAM      Date: 10/4/2020  Patient Name: Eugenia Ornelas    History of Presenting Illness     Chief Complaint   Patient presents with    Vomiting       History Provided By: Patient and EMS    HPI: Eugenia Ornelas, 63 y.o. female with a past medical history significant hypertension, renal insufficiency, asthma and IBS presents to the ED via EMS with cc of nausea and vomiting. Patient states that she has been feeling ill since approximately 6 am this morning. States that she woke up feeling nauseated before having several vomiting episodes that was accompanied by abdominal pain. Denies any fever, cough, diarrhea, chills, chest pain, shortness of breath, or any symptoms prior to today. Last dialyzed 10/02/2020. There are no other complaints, changes, or physical findings at this time. PCP: Yoli Jackson MD    Current Outpatient Medications   Medication Sig Dispense Refill    warfarin (COUMADIN) 2 mg tablet Take 4 mg by mouth daily.  ondansetron (Zofran ODT) 4 mg disintegrating tablet Take 1 Tab by mouth every eight (8) hours as needed for Nausea. 10 Tab 0    cephALEXin (KEFLEX) 500 mg capsule Take 1 Cap by mouth three (3) times daily for 10 days. 30 Cap 0    amLODIPine (NORVASC) 10 mg tablet TAKE 1 TABLET BY MOUTH EVERY DAY 90 Tab 3    omeprazole (PRILOSEC) 20 mg capsule TAKE ONE CAPSULE BY MOUTH TWICE DAILY 180 Cap 2    metoprolol tartrate (LOPRESSOR) 50 mg tablet Take 50 mg by mouth daily.  aluminum & magnesium hydroxide-simethicone (Maalox Maximum Strength) 400-400-40 mg/5 mL suspension Take 10 mL by mouth every six (6) hours as needed for Indigestion. 250 mL 0    simvastatin (ZOCOR) 20 mg tablet TAKE 1 TABLET BY MOUTH DAILY AS DIRECTED. 90 Tab 1    amiodarone (CORDARONE) 200 mg tablet Take 200 mg by mouth daily.       ESTRACE 0.01 % (0.1 mg/gram) vaginal cream INSERT 2 GRAMS INTO VAGINA EVERY MONDAY AND THURSDAY 42.5 g 5    cranberry extract 425 mg cap 425 mg.  LORazepam (ATIVAN) 0.5 mg tablet 0.5 mg.      dicyclomine (BENTYL) 10 mg capsule 10 mg.      fluticasone (FLONASE) 50 mcg/actuation nasal spray 1 Spray.  EPINEPHrine (EPIPEN) 0.3 mg/0.3 mL injection 0.3 mg.      hyoscyamine SL (LEVSIN/SL) 0.125 mg SL tablet 0.125 mg by SubLINGual route every four (4) hours as needed.  albuterol (PROVENTIL VENTOLIN) 2.5 mg /3 mL (0.083 %) nebulizer solution by Nebulization route as needed.  fluticasone-salmeterol (ADVAIR DISKUS) 250-50 mcg/dose diskus inhaler Take 1 Puff by inhalation every twelve (12) hours.  montelukast (SINGULAIR) 10 mg tablet Take 10 mg by mouth daily. Past History     Past Medical History:  Past Medical History:   Diagnosis Date    Anemia NEC     Arrhythmia     Asthma     Asthma     Burning with urination     frequent uti    CMV (cytomegalovirus) antibody positive     Dyspepsia and other specified disorders of function of stomach     stomach ulcer    Essential hypertension     GERD (gastroesophageal reflux disease)     Hypercholesteremia     Hypertension     Migraine     Obesity     Renal cyst 2002    kidney transplant     Ulcer        Past Surgical History:  Past Surgical History:   Procedure Laterality Date    ENDOSCOPY, COLON, DIAGNOSTIC      with Dr. Park Romero    HX GI      ulcer    HX HEENT      sinus surg    HX RENAL TRANSPLANT      VASCULAR SURGERY PROCEDURE UNLIST      shunt in prep for dialysis       Family History:  Family History   Problem Relation Age of Onset    Colon Polyps Brother        Social History:  Social History     Tobacco Use    Smoking status: Never Smoker    Smokeless tobacco: Never Used   Substance Use Topics    Alcohol use: No    Drug use: No       Allergies:   Allergies   Allergen Reactions    Aspirin Unknown (comments) and Rash    Bactrim [Sulfamethoxazole-Trimethoprim] Unknown (comments) and Rash    Bromfenac Unknown (comments) and Rash    Ceftriaxone Rash  Ibuprofen Unknown (comments) and Rash    Ketorolac Tromethamine Unknown (comments) and Rash    Morphine Unknown (comments) and Rash    Relafen [Nabumetone] Unknown (comments) and Rash    Rifampin Unknown (comments) and Rash    Vancomycin Unknown (comments) and Rash     Pt reports causes itching, takes benadryl prior to use. Pt denies anaphylaxis. Requires benadryl with each vancomycin dose         Review of Systems     Review of Systems   Constitutional: Positive for fatigue. Negative for diaphoresis and fever. HENT: Negative for ear pain, rhinorrhea and sore throat. Eyes: Negative for photophobia, pain and redness. Respiratory: Negative for cough, chest tightness and shortness of breath. Cardiovascular: Negative for chest pain, palpitations and leg swelling. Gastrointestinal: Positive for abdominal pain, nausea and vomiting. Negative for diarrhea. Endocrine: Negative for polydipsia, polyphagia and polyuria. Genitourinary: Negative for frequency, hematuria and pelvic pain. Musculoskeletal: Negative for arthralgias, back pain and joint swelling. Skin: Negative for color change, pallor, rash and wound. Allergic/Immunologic: Negative for environmental allergies, food allergies and immunocompromised state. Neurological: Positive for weakness. Negative for dizziness, seizures, numbness and headaches. Hematological: Negative for adenopathy. Does not bruise/bleed easily. Psychiatric/Behavioral: Negative for confusion and self-injury. The patient is not nervous/anxious. Physical Exam     Physical Exam  Constitutional:       General: She is not in acute distress. Appearance: She is obese. She is ill-appearing. HENT:      Head: Normocephalic and atraumatic.       Right Ear: Tympanic membrane, ear canal and external ear normal.      Left Ear: Tympanic membrane, ear canal and external ear normal.      Nose: Nose normal.      Mouth/Throat:      Mouth: Mucous membranes are moist.      Pharynx: Oropharynx is clear. Eyes:      Extraocular Movements: Extraocular movements intact. Conjunctiva/sclera: Conjunctivae normal.      Pupils: Pupils are equal, round, and reactive to light. Neck:      Musculoskeletal: Normal range of motion and neck supple. No neck rigidity or muscular tenderness. Cardiovascular:      Rate and Rhythm: Normal rate and regular rhythm. Pulses: Normal pulses. Heart sounds: Normal heart sounds. No murmur. No friction rub. No gallop. Pulmonary:      Effort: Pulmonary effort is normal. No respiratory distress. Breath sounds: Normal breath sounds. Chest:      Chest wall: No tenderness. Abdominal:      General: Bowel sounds are normal.      Palpations: Abdomen is soft. There is no mass. Tenderness: There is no abdominal tenderness. Musculoskeletal: Normal range of motion. General: No swelling or tenderness. Comments: Left upper extremity shunt in place for dialysis   Skin:     General: Skin is warm. Coloration: Skin is not pale. Findings: No bruising or erythema. Neurological:      Mental Status: She is alert and oriented to person, place, and time. Motor: No weakness. Psychiatric:         Mood and Affect: Mood normal.         Behavior: Behavior normal.         Thought Content:  Thought content normal.         Judgment: Judgment normal.         Diagnostic Study Results     Labs -     Recent Results (from the past 12 hour(s))   EKG, 12 LEAD, INITIAL    Collection Time: 10/04/20  9:43 AM   Result Value Ref Range    Ventricular Rate 63 BPM    Atrial Rate 63 BPM    P-R Interval 204 ms    QRS Duration 103 ms    Q-T Interval 540 ms    QTC Calculation (Bezet) 553 ms    Calculated P Axis 45 degrees    Calculated R Axis 43 degrees    Calculated T Axis 32 degrees    Diagnosis       Sinus rhythm  Probable left atrial enlargement  Borderline T wave abnormalities  Prolonged QT interval     CBC WITH AUTOMATED DIFF Collection Time: 10/04/20 11:15 AM   Result Value Ref Range    WBC 10.9 4.6 - 13.2 K/uL    RBC 3.24 (L) 4.20 - 5.30 M/uL    HGB 11.0 (L) 12.0 - 16.0 g/dL    HCT 35.3 35.0 - 45.0 %    .0 (H) 74.0 - 97.0 FL    MCH 34.0 24.0 - 34.0 PG    MCHC 31.2 31.0 - 37.0 g/dL    RDW 15.4 (H) 11.6 - 14.5 %    PLATELET 724 368 - 218 K/uL    MPV 9.2 9.2 - 11.8 FL    NEUTROPHILS 79 (H) 40 - 73 %    LYMPHOCYTES 15 (L) 21 - 52 %    MONOCYTES 5 3 - 10 %    EOSINOPHILS 1 0 - 5 %    BASOPHILS 0 0 - 2 %    IMMATURE GRANULOCYTES 0 %    ABS. NEUTROPHILS 8.6 (H) 1.8 - 8.0 K/UL    ABS. LYMPHOCYTES 1.6 0.9 - 3.6 K/UL    ABS. MONOCYTES 0.5 0.05 - 1.2 K/UL    ABS. EOSINOPHILS 0.2 0.0 - 0.4 K/UL    ABS. BASOPHILS 0.0 0.0 - 0.1 K/UL    ABS. IMM. GRANS. 0.0 K/UL   METABOLIC PANEL, COMPREHENSIVE    Collection Time: 10/04/20 11:15 AM   Result Value Ref Range    Sodium 138 135 - 145 mmol/L    Potassium 3.1 (L) 3.2 - 5.1 mmol/L    Chloride 101 94 - 111 mmol/L    CO2 26 21 - 33 mmol/L    Anion gap 11 mmol/L    Glucose 120 (H) 70 - 110 mg/dL    BUN 17 9 - 21 mg/dL    Creatinine 4.80 (H) 0.70 - 1.20 mg/dL    BUN/Creatinine ratio 4      GFR est AA 11 ml/min/1.73m2    GFR est non-AA 9 ml/min/1.73m2    Calcium 8.2 (L) 8.5 - 10.5 mg/dL    Bilirubin, total 0.5 0.2 - 1.0 mg/dL    AST (SGOT) 27 14 - 74 U/L    ALT (SGPT) 17 3 - 52 U/L    Alk. phosphatase 99 38 - 126 U/L    Protein, total 7.5 6.1 - 8.4 g/dL    Albumin 3.8 3.5 - 4.7 g/dL    Globulin 3.7 g/dL    A-G Ratio 1.0     LIPASE    Collection Time: 10/04/20 11:15 AM   Result Value Ref Range    Lipase 56 10 - 57 U/L   TROPONIN I    Collection Time: 10/04/20 11:15 AM   Result Value Ref Range    Troponin-I, Qt. <0.05 0.02 - 0.05 ng/mL       Radiologic Studies -   [unfilled]  CT Results  (Last 48 hours)               10/04/20 1104  CT ABD PELV WO CONT Final result    Impression:  IMPRESSION:           1. No bowel dilatation or acute inflammatory process. Small hiatal hernia.    2. Cholelithiasis without signs of cholecystitis. 3. Colonic diverticulosis without signs of diverticulitis. 4. Transplanted right pelvic kidney. Atrophic changes of the native kidneys. Narrative:  EXAM: CT of the abdomen and pelvis       INDICATION: Abdominal pain and vomiting       COMPARISON: 5/8/2019       TECHNIQUE: Helical volumetric scanning through the abdomen and pelvis was   performed without oral or intravenous contrast.  Axial, coronal and sagittal   reconstructions were created. One or more dose reduction techniques were used on   this CT: automated exposure control, adjustment of the mAs and/or kVp according   to patient size, and iterative reconstruction techniques. The specific   techniques used on this CT exam have been documented in the patient's electronic   medical record. Digital Imaging and Communications in Medicine (DICOM) format   image data are available to nonaffiliated external healthcare facilities or   entities on a secure, media free, reciprocally searchable basis with patient   authorization for at least a 12-month period after this study. _______________       FINDINGS:       LOWER CHEST: Within normal limits. LIVER, BILIARY: The liver is normal for noncontrast technique. No biliary   dilation. A couple low-density gallstones are present. No signs of   cholecystitis. PANCREAS: Pancreas is mildly atrophic. No significant peripancreatic fat   stranding. SPLEEN: Normal.       ADRENALS: Normal.       KIDNEYS: Atrophic changes of the native kidneys. Benign simple cyst upper pole   of the left kidney is stable. No renal or ureteral calculi. No hydronephrosis. Transplanted right pelvic kidney is present with mild adjacent fat stranding,   chronic, suggesting scarring. No hydronephrosis.  A couple tiny high densities   are present in the interpolar and upper pole of the transplanted kidney and a   low density is present in the upper pole, without change, suggesting benign   water density and high density simple cysts. LYMPH NODES: No enlarged lymph nodes. GASTROINTESTINAL TRACT: No bowel dilation or wall thickening. Numerous left   colon and sigmoid colon diverticula are present without signs of diverticulitis. Normal appendix. Small hiatal hernia is present. PELVIC ORGANS: Urinary bladder is normal. No pelvic masses. VASCULATURE: Calcific atherosclerosis, mild. No AAA. BONES: No suspicious osseous lesions. Mild anterior wedging of a couple lower   thoracic vertebral bodies is chronic. OTHER: None.       _______________               CXR Results  (Last 48 hours)    None          Medical Decision Making and ED Course   I am the first provider for this patient. I reviewed the vital signs, available nursing notes, past medical history, past surgical history, family history and social history. Vital Signs-Reviewed the patient's vital signs. Patient Vitals for the past 12 hrs:   Temp Pulse Resp BP SpO2   10/04/20 1442  74 18 137/73 98 %   10/04/20 1344  65 15 129/66 98 %   10/04/20 1235  68  (!) 132/59    10/04/20 1149  65 16 (!) 155/104 97 %   10/04/20 0943 97.5 °F (36.4 °C) 66 18 (!) 183/84 99 %       EKG interpretation: (Preliminary) 0943am  Rhythm: normal sinus rhythm; and regular . Rate (approx.): 63; Axis: normal; TX interval: prolonged; QRS interval: normal ; ST/T wave: depressed; Other findings: abnormal ekg. Records Reviewed: Nursing Notes    The patient presents with nausea and vomiting. DD of gastroenteritis, dehydration, small bowel obstruction. Provider Notes (Medical Decision Making):     [facility]       ED Course:   3926: Patient still feeling nauseated and still having abdominal pain. Giving more antiemetics and pain medications. Initial assessment performed. The patients presenting problems have been discussed, and they are in agreement with the care plan formulated and outlined with them.   I have encouraged them to ask questions as they arise throughout their visit. 1515 - Pt now feeling better. She is aysmptomatic. Will DC home. 1520 -presented via EMS with nausea vomiting abdominal pain. Afebrile. White count was normal her LFTs were normal.  She is a dialysis patient. CT of the abdomen showed gallstones but no cholecystitis. We were able to give her relief of symptoms with pain meds, antiemetics, and IV fluids. Patient be discharged home. Pt  to be referred to Dr. Foster Nicolas    Procedures     Procedures         Disposition       DISCHARGE PLAN:  1. Current Discharge Medication List      CONTINUE these medications which have NOT CHANGED    Details   warfarin (COUMADIN) 2 mg tablet Take 4 mg by mouth daily. cephALEXin (KEFLEX) 500 mg capsule Take 1 Cap by mouth three (3) times daily for 10 days. Qty: 30 Cap, Refills: 0    Associated Diagnoses: Urinary tract infection without hematuria, site unspecified      amLODIPine (NORVASC) 10 mg tablet TAKE 1 TABLET BY MOUTH EVERY DAY  Qty: 90 Tab, Refills: 3      omeprazole (PRILOSEC) 20 mg capsule TAKE ONE CAPSULE BY MOUTH TWICE DAILY  Qty: 180 Cap, Refills: 2      metoprolol tartrate (LOPRESSOR) 50 mg tablet Take 50 mg by mouth daily. aluminum & magnesium hydroxide-simethicone (Maalox Maximum Strength) 400-400-40 mg/5 mL suspension Take 10 mL by mouth every six (6) hours as needed for Indigestion. Qty: 250 mL, Refills: 0      simvastatin (ZOCOR) 20 mg tablet TAKE 1 TABLET BY MOUTH DAILY AS DIRECTED. Qty: 90 Tab, Refills: 1      amiodarone (CORDARONE) 200 mg tablet Take 200 mg by mouth daily. ESTRACE 0.01 % (0.1 mg/gram) vaginal cream INSERT 2 GRAMS INTO VAGINA EVERY MONDAY AND THURSDAY  Qty: 42.5 g, Refills: 5      cranberry extract 425 mg cap 425 mg. LORazepam (ATIVAN) 0.5 mg tablet 0.5 mg.      dicyclomine (BENTYL) 10 mg capsule 10 mg.      fluticasone (FLONASE) 50 mcg/actuation nasal spray 1 Spray.       EPINEPHrine (EPIPEN) 0.3 mg/0.3 mL injection 0.3 mg.      hyoscyamine SL (LEVSIN/SL) 0.125 mg SL tablet 0.125 mg by SubLINGual route every four (4) hours as needed. albuterol (PROVENTIL VENTOLIN) 2.5 mg /3 mL (0.083 %) nebulizer solution by Nebulization route as needed. fluticasone-salmeterol (ADVAIR DISKUS) 250-50 mcg/dose diskus inhaler Take 1 Puff by inhalation every twelve (12) hours. montelukast (SINGULAIR) 10 mg tablet Take 10 mg by mouth daily. 2.   Follow-up Information     Follow up With Specialties Details Why Contact Info    Yen Amado DO General Surgery, General Surgery In 3 days  [address] [facility] [address] [phone]          3. Return to ED if worse     Diagnosis     Clinical Impression:   1. Gallstones    2. Non-intractable vomiting with nausea, unspecified vomiting type        Attestations:By signing my name below, I, Elicia Calzada, attest that this documentation has been prepared under the direction and in presence of Dr Batsheva Roberts on 10/04/2020. Electronically signed: Beryle Grosser, 10/04/2020 MD Francois    Please note that this dictation was completed with [employer], the computer voice recognition software. Quite often unanticipated grammatical, syntax, homophones, and other interpretive errors are inadvertently transcribed by the computer software. Please disregard these errors. Please excuse any errors that have escaped final proofreading. Thank you.

## 2020-10-04 NOTE — ED NOTES
Contacted [facility] pharmacy for medication verification of Dilaudid and Promethazine.

## 2020-10-04 NOTE — ED TRIAGE NOTES
Pt states that she woke up this morning with nausea and vomiting and then started having lower abdominal pain. Pt appears lethargic but is alert and oriented x 4.

## 2020-10-05 LAB
ATRIAL RATE: 63 BPM
CALCULATED P AXIS, ECG09: 45 DEGREES
CALCULATED R AXIS, ECG10: 43 DEGREES
CALCULATED T AXIS, ECG11: 32 DEGREES
DIAGNOSIS, 93000: NORMAL
P-R INTERVAL, ECG05: 204 MS
Q-T INTERVAL, ECG07: 540 MS
QRS DURATION, ECG06: 103 MS
QTC CALCULATION (BEZET), ECG08: 553 MS
VENTRICULAR RATE, ECG03: 63 BPM

## 2020-10-09 ENCOUNTER — TELEPHONE (OUTPATIENT)
Dept: FAMILY MEDICINE CLINIC | Age: 65
End: 2020-10-09

## 2020-10-09 ENCOUNTER — VIRTUAL VISIT (OUTPATIENT)
Dept: FAMILY MEDICINE CLINIC | Age: 65
End: 2020-10-09
Payer: MEDICARE

## 2020-10-09 DIAGNOSIS — K21.00 GASTROESOPHAGEAL REFLUX DISEASE WITH ESOPHAGITIS WITHOUT HEMORRHAGE: ICD-10-CM

## 2020-10-09 DIAGNOSIS — I10 ESSENTIAL HYPERTENSION: ICD-10-CM

## 2020-10-09 DIAGNOSIS — I10 HYPERTENSION, UNSPECIFIED TYPE: ICD-10-CM

## 2020-10-09 DIAGNOSIS — K80.00 CALCULUS OF GALLBLADDER WITH ACUTE CHOLECYSTITIS WITHOUT OBSTRUCTION: Primary | ICD-10-CM

## 2020-10-09 PROCEDURE — 99442 PR PHYS/QHP TELEPHONE EVALUATION 11-20 MIN: CPT | Performed by: FAMILY MEDICINE

## 2020-10-09 RX ORDER — ONDANSETRON 8 MG/1
TABLET, ORALLY DISINTEGRATING ORAL
Qty: 8 TAB | Refills: 2 | Status: SHIPPED | OUTPATIENT
Start: 2020-10-09 | End: 2021-06-17 | Stop reason: SDUPTHER

## 2020-10-09 RX ORDER — OXYCODONE AND ACETAMINOPHEN 5; 325 MG/1; MG/1
1 TABLET ORAL
Qty: 15 TAB | Refills: 0 | Status: SHIPPED | OUTPATIENT
Start: 2020-10-09 | End: 2020-10-12

## 2020-10-09 NOTE — PROGRESS NOTES
Cleveland Howard presents today for   Chief Complaint   Patient presents with    Abdominal Pain     ER follow up [facility]       Depression Screening:  3 most recent PHQ Screens 10/9/2020   Little interest or pleasure in doing things Several days   Feeling down, depressed, irritable, or hopeless Several days   Total Score PHQ 2 2       Learning Assessment:  Learning Assessment 4/17/2013   PRIMARY LEARNER Patient   BARRIERS PRIMARY LEARNER NONE   PRIMARY LANGUAGE ENGLISH   LEARNER PREFERENCE PRIMARY DEMONSTRATION   RELATIONSHIP SELF       Abuse Screening:  Abuse Screening Questionnaire 10/9/2020   Do you ever feel afraid of your partner? N   Are you in a relationship with someone who physically or mentally threatens you? N   Is it safe for you to go home? Y       Fall Risk  Fall Risk Assessment, last 12 mths 10/9/2020   Able to walk? Yes   Fall in past 12 months? Yes   Fall with injury? Yes   Number of falls in past 12 months 1   Fall Risk Score 2       ADL  ADL Assessment 10/9/2020   Feeding yourself No Help Needed   Getting from bed to chair No Help Needed   Getting dressed No Help Needed   Bathing or showering No Help Needed   Walk across the room (includes cane/walker) No Help Needed   Using the telphone No Help Needed   Taking your medications No Help Needed   Preparing meals No Help Needed   Managing money (expenses/bills) No Help Needed   Moderately strenuous housework (laundry) No Help Needed   Shopping for personal items (toiletries/medicines) No Help Needed   Shopping for groceries No Help Needed   Driving No Help Needed   Climbing a flight of stairs No Help Needed   Getting to places beyond walking distances No Help Needed       Health Maintenance reviewed and discussed and ordered per Provider.     Health Maintenance Due   Topic Date Due    Hepatitis C Screening  1955    DTaP/Tdap/Td series (1 - Tdap) 07/03/1978    PAP AKA CERVICAL CYTOLOGY  07/03/1978    Shingrix Vaccine Age 50> (1 of 2) 07/03/2007  Breast Cancer Screen Mammogram  07/03/2007    FOBT Q1Y Age 50-75  07/03/2007    Pneumococcal 0-64 years (3 of 3 - PCV13) 11/13/2017    Medicare Yearly Exam  05/13/2020   . Coordination of Care:  1. Have you been to the ER, urgent care clinic since your last visit? Hospitalized since your last visit? Yes [facility] on 10/4/2020 for abdominal pain and vomiting, diagnosed with gallstones    2. Have you seen or consulted any other health care providers outside of the [address] [place] [address] since your last visit? Include any pap smears or colon screening.  no

## 2020-10-09 NOTE — PROGRESS NOTES
Mukund Rosales is a 63 y.o. female, evaluated via audio-only technology on 10/9/2020 for Abdominal Pain (ER follow up [facility])  . Assessment & Plan: Gallstones, cholecystitis, end-stage renal disease status post transplant, intermittent pain: The patient has had intermittent nausea with pain she was diagnosed with gallstones. We will give her some pain medication. I am going to give her some nausea medicine as well I think her surgeon needs to see her just for an evaluation and she probably needs to be seen in the [facility] area to the patient today. She was given Dilaudid in the hospital.  There is something on her chart that suggest a codeine/morphine allergy she would not address that with me today and I tried to reach her back and I could not get through her phone line. I am going to give her something for nausea she is not going to be given anything for pain until she talks to me about it and I am unable to reach her because her mailbox is full. This was a phone to phone interaction the patient was at her home I was in my office. It was       12  Subjective: Is seen in a telephone to telephone evaluation. No nausea vomiting or diarrhea. She was hospitalized last week with vomiting and abdominal pain and found to have gallstones. She was given Dilaudid there is a questionable allergy to codeine/morphine she was sent home on no pain medication as usual by surgery as well as nothing for nausea and told to check with her primary care doctor no one suggested a surgery evaluation which I think she needs in spite of her chronic medical issues. Has had no falls or injuries no rash no syncope or loss of consciousness bowel movements have been appropriate. Eating relatively well. Nobody at home is been sick       Prior to Admission medications    Medication Sig Start Date End Date Taking? Authorizing Provider   warfarin (COUMADIN) 2 mg tablet Take 4 mg by mouth daily.  9/24/20  Yes Other, MD Abelardo   amLODIPine (NORVASC) 10 mg tablet TAKE 1 TABLET BY MOUTH EVERY DAY 9/22/20  Yes Brien Aase, MD   omeprazole (PRILOSEC) 20 mg capsule TAKE ONE CAPSULE BY MOUTH TWICE DAILY 9/22/20  Yes Brien Aase, MD   metoprolol tartrate (LOPRESSOR) 50 mg tablet Take 50 mg by mouth daily. 2/22/19  Yes Marleni, MD Abelardo   aluminum & magnesium hydroxide-simethicone (Maalox Maximum Strength) 400-400-40 mg/5 mL suspension Take 10 mL by mouth every six (6) hours as needed for Indigestion. 9/17/20  Yes Ady Brownlee MD   simvastatin (ZOCOR) 20 mg tablet TAKE 1 TABLET BY MOUTH DAILY AS DIRECTED. 8/23/20  Yes Brien Aase, MD   amiodarone (CORDARONE) 200 mg tablet Take 200 mg by mouth daily. 2/22/19  Yes Provider, Historical   ESTRACE 0.01 % (0.1 mg/gram) vaginal cream INSERT 2 GRAMS INTO VAGINA EVERY MONDAY AND THURSDAY 4/11/17  Yes Opal Alberto MD   cranberry extract 425 mg cap 425 mg. 1/17/14  Yes Provider, Historical   LORazepam (ATIVAN) 0.5 mg tablet 0.5 mg.   Yes Provider, Historical   dicyclomine (BENTYL) 10 mg capsule 10 mg. 1/17/14  Yes Provider, Historical   fluticasone (FLONASE) 50 mcg/actuation nasal spray 1 Spray. Yes Provider, Historical   EPINEPHrine (EPIPEN) 0.3 mg/0.3 mL injection 0.3 mg. 10/1/16  Yes Provider, Historical   hyoscyamine SL (LEVSIN/SL) 0.125 mg SL tablet 0.125 mg by SubLINGual route every four (4) hours as needed. Yes Provider, Historical   albuterol (PROVENTIL VENTOLIN) 2.5 mg /3 mL (0.083 %) nebulizer solution by Nebulization route as needed. Yes Provider, Historical   fluticasone-salmeterol (ADVAIR DISKUS) 250-50 mcg/dose diskus inhaler Take 1 Puff by inhalation every twelve (12) hours. Yes Provider, Historical   montelukast (SINGULAIR) 10 mg tablet Take 10 mg by mouth daily. Yes Provider, Historical   ondansetron (Zofran ODT) 4 mg disintegrating tablet Take 1 Tab by mouth every eight (8) hours as needed for Nausea.  10/4/20   Lucas Aiken MD   cephALEXin ([address]) 500 mg capsule Take 1 Cap by mouth three (3) times daily for 10 days. 10/1/20 10/11/20  Ashleigh Daniel MD     Patient Active Problem List   Diagnosis Code    History of kidney transplant Z94.0    CMV (cytomegalovirus) antibody positive R89.4    Hematuria R31.9    Fecal incontinence R15.9    Drug-induced hyperglycemia R73.9, T50.905A    Diverticulitis of intestine K57.92    Anemia D64.9    Acute renal failure ([address] N17.9    Asthma J45.909    Exacerbation of asthma J45. 901    Chronic kidney disease, stage III (moderate) N18.30    Chronic obstructive pulmonary disease (HCC) J44.9    Cystic disease of kidney Q61.9    Diverticular disease of large intestine K57.30    Essential hypertension I10    Fever R50.9    Gastroenteritis K52.9    Gastroesophageal reflux disease K21.9    Hay fever J30.1    Hyperlipidemia E78.5    Hyperkalemia E87.5    Disease due to gram-negative bacillus B96.89    Kidney transplant rejection T86.11    Migraine without status migrainosus, not intractable G43.909    Nausea and vomiting R11.2    Adiposity E66.9    Pruritus L29.9    Recurrent urinary tract infection N39.0    Urinary tract infection N39.0    Renal transplant disorder T86.10    Systemic infection (HCC) A41.9    Systemic inflammatory response syndrome (SIRS) ([facility]) R65.10    Ulcer [id number]    Renal cyst N28.1    Obesity E66.9    Migraine G43.909    Hypertension I10    Hypercholesteremia E78.00    GERD (gastroesophageal reflux disease) K21.9    Burning with urination R30.0    Arrhythmia I49.9    Acute recurrent maxillary sinusitis J01.01    Calculus of gallbladder with acute cholecystitis without obstruction K80.00       Review of Systems   Constitutional: Positive for malaise/fatigue. HENT: Negative. Eyes: Negative. Respiratory: Negative. Cardiovascular: Negative. Gastrointestinal: Positive for abdominal pain, nausea and vomiting. Genitourinary: Negative. Musculoskeletal: Negative. Neurological: Negative. Endo/Heme/Allergies: Negative. Psychiatric/Behavioral: Negative. Patient-Reported Vitals 8/6/2020   Patient-Reported Weight 150   Patient-Reported Height 9'5\"        Lucky Sever, who was evaluated through a patient-initiated, synchronous (real-time) audio only encounter, and/or her healthcare decision maker, is aware that it is a billable service, with coverage as determined by her insurance carrier. She provided verbal consent to proceed: n/a- consent obtained within past 12 months. She has not had a related appointment within my department in the past 7 days or scheduled within the next 24 hours.       Total Time: minutes: 11-20 minutes    Mariella Pruitt MD

## 2020-10-09 NOTE — TELEPHONE ENCOUNTER
I sent a prescription for Percocet I called the patient she has taken it in the past she has no allergic reaction

## 2020-10-12 ENCOUNTER — HOSPITAL ENCOUNTER (EMERGENCY)
Age: 65
Discharge: HOME | End: 2020-10-12
Attending: EMERGENCY MEDICINE
Payer: MEDICARE

## 2020-10-12 VITALS
BODY MASS INDEX: 29.44 KG/M2 | OXYGEN SATURATION: 98 % | RESPIRATION RATE: 17 BRPM | WEIGHT: 160 LBS | TEMPERATURE: 98 F | SYSTOLIC BLOOD PRESSURE: 170 MMHG | HEIGHT: 62 IN | HEART RATE: 74 BPM | DIASTOLIC BLOOD PRESSURE: 49 MMHG

## 2020-10-12 DIAGNOSIS — M79.601 ARM PAIN, INFERIOR, RIGHT: Primary | ICD-10-CM

## 2020-10-12 PROCEDURE — 99283 EMERGENCY DEPT VISIT LOW MDM: CPT

## 2020-10-12 RX ORDER — TRAMADOL HYDROCHLORIDE 50 MG/1
50 TABLET ORAL
Qty: 8 TAB | Refills: 0 | Status: SHIPPED | OUTPATIENT
Start: 2020-10-12 | End: 2020-10-15

## 2020-10-12 NOTE — ED TRIAGE NOTES
Pt states \"about once every year I always start to get right arm pain from the shoulder down, its a really bad throbbing ache. This time it started 10/09/20. It had me in tears last night. \"

## 2020-10-12 NOTE — ED PROVIDER NOTES
EMERGENCY DEPARTMENT HISTORY AND PHYSICAL EXAM      Date: 10/12/2020  Patient Name: Bob Jacome      History of Presenting Illness     Chief Complaint   Patient presents with    Arm Pain       History Provided By: Patient    HPI: Bob Jacome, 63 y.o. female with a past medical history significant No significant past medical history presents to the ED with cc of arm pain. Pt states she has been having R diffuse arm pain x 2 weeks, that has worsened over the last 4 days. Pt notes that she had an IV placed 2 weeks ago during a doctor's appointment where she believes a nerve had been hit, noting of R arm pain ever since. Pt also notes that the pain worsened when they had placed a blood pressure cuff on her R upper arm 4 days ago at her dialysis appointment, prompting her visit to the ED. Pt endorses taking Tylenol for the pain but with no alleviation. Pt notes her last dialysis appointment was today, and her dialysis catheter is in her L arm. Pt denies decreased ROM or any other sxs. There are no other complaints, changes, or physical findings at this time. PCP: Brit Smith MD    Current Outpatient Medications   Medication Sig Dispense Refill    traMADoL (Ultram) 50 mg tablet Take 1 Tab by mouth every six (6) hours as needed for Pain for up to 3 days. Max Daily Amount: 200 mg. 8 Tab 0    ondansetron (ZOFRAN ODT) 8 mg disintegrating tablet 1 p.o. every 6 to 8 hours for nausea and vomiting this does not need to be a tablet that orally dissolves. 8 Tab 2    oxyCODONE-acetaminophen (PERCOCET) 5-325 mg per tablet Take 1 Tab by mouth every four (4) hours as needed for Pain for up to 3 days. Max Daily Amount: 6 Tabs. 15 Tab 0    warfarin (COUMADIN) 2 mg tablet Take 4 mg by mouth daily.       amLODIPine (NORVASC) 10 mg tablet TAKE 1 TABLET BY MOUTH EVERY DAY 90 Tab 3    omeprazole (PRILOSEC) 20 mg capsule TAKE ONE CAPSULE BY MOUTH TWICE DAILY 180 Cap 2    metoprolol tartrate (LOPRESSOR) 50 mg tablet Take 50 mg by mouth daily.  aluminum & magnesium hydroxide-simethicone (Maalox Maximum Strength) 400-400-40 mg/5 mL suspension Take 10 mL by mouth every six (6) hours as needed for Indigestion. 250 mL 0    simvastatin (ZOCOR) 20 mg tablet TAKE 1 TABLET BY MOUTH DAILY AS DIRECTED. 90 Tab 1    amiodarone (CORDARONE) 200 mg tablet Take 200 mg by mouth daily.  ESTRACE 0.01 % (0.1 mg/gram) vaginal cream INSERT 2 GRAMS INTO VAGINA EVERY MONDAY AND THURSDAY 42.5 g 5    cranberry extract 425 mg cap 425 mg.  LORazepam (ATIVAN) 0.5 mg tablet 0.5 mg.      dicyclomine (BENTYL) 10 mg capsule 10 mg.      fluticasone (FLONASE) 50 mcg/actuation nasal spray 1 Spray.  EPINEPHrine (EPIPEN) 0.3 mg/0.3 mL injection 0.3 mg.      hyoscyamine SL (LEVSIN/SL) 0.125 mg SL tablet 0.125 mg by SubLINGual route every four (4) hours as needed.  albuterol (PROVENTIL VENTOLIN) 2.5 mg /3 mL (0.083 %) nebulizer solution by Nebulization route as needed.  fluticasone-salmeterol (ADVAIR DISKUS) 250-50 mcg/dose diskus inhaler Take 1 Puff by inhalation every twelve (12) hours.  montelukast (SINGULAIR) 10 mg tablet Take 10 mg by mouth daily.            Past History     Past Medical History:  Past Medical History:   Diagnosis Date    Anemia NEC     Arrhythmia     Asthma     Asthma     Burning with urination     frequent uti    CMV (cytomegalovirus) antibody positive     Dyspepsia and other specified disorders of function of stomach     stomach ulcer    Essential hypertension     GERD (gastroesophageal reflux disease)     Hypercholesteremia     Hypertension     Migraine     Obesity     Renal cyst 2002    kidney transplant     Ulcer        Past Surgical History:  Past Surgical History:   Procedure Laterality Date    ENDOSCOPY, COLON, DIAGNOSTIC      with Dr. Flex Smith    HX GI      ulcer    HX HEENT      sinus surg    HX RENAL TRANSPLANT      VASCULAR SURGERY PROCEDURE UNLIST      shunt in prep for dialysis Family History:  Family History   Problem Relation Age of Onset    Colon Polyps Brother        Social History:  Social History     Tobacco Use    Smoking status: Never Smoker    Smokeless tobacco: Never Used   Substance Use Topics    Alcohol use: No    Drug use: No       Allergies: Allergies   Allergen Reactions    Aspirin Unknown (comments) and Rash    Bactrim [Sulfamethoxazole-Trimethoprim] Unknown (comments) and Rash    Bromfenac Unknown (comments) and Rash    Ceftriaxone Rash    Ibuprofen Unknown (comments) and Rash    Ketorolac Tromethamine Unknown (comments) and Rash    Morphine Unknown (comments) and Rash    Relafen [Nabumetone] Unknown (comments) and Rash    Rifampin Unknown (comments) and Rash    Vancomycin Unknown (comments) and Rash     Pt reports causes itching, takes benadryl prior to use. Pt denies anaphylaxis. Requires benadryl with each vancomycin dose         Review of Systems     Review of Systems   Constitutional: Negative for chills, diaphoresis and fever. HENT: Negative for congestion, ear pain and sore throat. Eyes: Negative for pain, redness and visual disturbance. Respiratory: Negative for cough, shortness of breath and wheezing. Cardiovascular: Negative for chest pain and leg swelling. Gastrointestinal: Negative for abdominal pain, diarrhea, nausea and vomiting. Endocrine: Negative for polydipsia, polyphagia and polyuria. Genitourinary: Negative for dysuria, frequency and hematuria. Musculoskeletal:        Diffuse R arm pain   Skin: Negative for color change and rash. Neurological: Negative for dizziness, weakness and numbness. Psychiatric/Behavioral: Negative for confusion. The patient is not nervous/anxious. Physical Exam     Physical Exam  Vitals signs and nursing note reviewed. Constitutional:       General: She is awake. Appearance: Normal appearance. She is well-developed, well-groomed and normal weight.    HENT:      Head: Normocephalic and atraumatic. Eyes:      Extraocular Movements: Extraocular movements intact. Pupils: Pupils are equal, round, and reactive to light. Neck:      Musculoskeletal: Full passive range of motion without pain, normal range of motion and neck supple. Cardiovascular:      Rate and Rhythm: Normal rate and regular rhythm. Heart sounds: Normal heart sounds. Pulmonary:      Effort: Pulmonary effort is normal.      Breath sounds: Normal breath sounds and air entry. Abdominal:      General: Abdomen is flat. Palpations: Abdomen is soft. Musculoskeletal:      Right shoulder: She exhibits normal range of motion, no swelling and no pain. Right elbow: She exhibits normal range of motion and no swelling. Right upper arm: She exhibits tenderness. She exhibits no swelling. Comments: No evidence of induration on R arm, shoulder, or elbow   Skin:     General: Skin is warm and dry. Neurological:      General: No focal deficit present. Mental Status: She is alert and oriented to person, place, and time. Psychiatric:         Attention and Perception: Attention and perception normal.         Mood and Affect: Mood and affect normal.         Speech: Speech normal.         Behavior: Behavior normal. Behavior is cooperative. Thought Content: Thought content normal.         Cognition and Memory: Cognition and memory normal.         Judgment: Judgment normal.         Medical Decision Making and ED Course   I am the first provider for this patient. I reviewed the vital signs, available nursing notes, past medical history, past surgical history, family history and social history. Vital Signs-Reviewed the patient's vital signs.   Patient Vitals for the past 12 hrs:   Temp Pulse Resp BP SpO2   10/12/20 1428     100 %   10/12/20 1424 98 °F (36.7 °C) 82 17 (!) 172/84 100 %       The patient presents with R arm pain with a differential diagnosis of myositis vs tendonitis vs radiculopathy vs DVT vs cellulitis. Provider Notes (Medical Decision Making):       ED Course:   Initial assessment performed. The patients presenting problems have been discussed, and they are in agreement with the care plan formulated and outlined with them. I have encouraged them to ask questions as they arise throughout their visit. CONSULTATIONS:          José Manuel Hayes      Disposition       Discharged  Remove if not discharged  DISCHARGE PLAN:  1. Current Discharge Medication List      CONTINUE these medications which have NOT CHANGED    Details   ondansetron (ZOFRAN ODT) 8 mg disintegrating tablet 1 p.o. every 6 to 8 hours for nausea and vomiting this does not need to be a tablet that orally dissolves. Qty: 8 Tab, Refills: 2    Associated Diagnoses: Calculus of gallbladder with acute cholecystitis without obstruction      oxyCODONE-acetaminophen (PERCOCET) 5-325 mg per tablet Take 1 Tab by mouth every four (4) hours as needed for Pain for up to 3 days. Max Daily Amount: 6 Tabs. Qty: 15 Tab, Refills: 0    Associated Diagnoses: Calculus of gallbladder with acute cholecystitis without obstruction      warfarin (COUMADIN) 2 mg tablet Take 4 mg by mouth daily. amLODIPine (NORVASC) 10 mg tablet TAKE 1 TABLET BY MOUTH EVERY DAY  Qty: 90 Tab, Refills: 3      omeprazole (PRILOSEC) 20 mg capsule TAKE ONE CAPSULE BY MOUTH TWICE DAILY  Qty: 180 Cap, Refills: 2      metoprolol tartrate (LOPRESSOR) 50 mg tablet Take 50 mg by mouth daily. aluminum & magnesium hydroxide-simethicone (Maalox Maximum Strength) 400-400-40 mg/5 mL suspension Take 10 mL by mouth every six (6) hours as needed for Indigestion. Qty: 250 mL, Refills: 0      simvastatin (ZOCOR) 20 mg tablet TAKE 1 TABLET BY MOUTH DAILY AS DIRECTED. Qty: 90 Tab, Refills: 1      amiodarone (CORDARONE) 200 mg tablet Take 200 mg by mouth daily.       ESTRACE 0.01 % (0.1 mg/gram) vaginal cream INSERT 2 GRAMS INTO VAGINA EVERY MONDAY AND THURSDAY  Qty: 42.5 g, Refills: 5      cranberry extract 425 mg cap 425 mg. LORazepam (ATIVAN) 0.5 mg tablet 0.5 mg.      dicyclomine (BENTYL) 10 mg capsule 10 mg.      fluticasone (FLONASE) 50 mcg/actuation nasal spray 1 Spray. EPINEPHrine (EPIPEN) 0.3 mg/0.3 mL injection 0.3 mg.      hyoscyamine SL (LEVSIN/SL) 0.125 mg SL tablet 0.125 mg by SubLINGual route every four (4) hours as needed. albuterol (PROVENTIL VENTOLIN) 2.5 mg /3 mL (0.083 %) nebulizer solution by Nebulization route as needed. fluticasone-salmeterol (ADVAIR DISKUS) 250-50 mcg/dose diskus inhaler Take 1 Puff by inhalation every twelve (12) hours. montelukast (SINGULAIR) 10 mg tablet Take 10 mg by mouth daily. STOP taking these medications       cephALEXin (KEFLEX) 500 mg capsule Comments:   Reason for Stoppin.   Follow-up Information     Follow up With Specialties Details Why Contact Priscilla Ortega MD Family Medicine In 2 days As needed, If symptoms worsen [place] [address]  [phone]          3. Return to ED if worse     Diagnosis     Clinical Impression:   1. Arm pain, inferior, right        Attestations:  By signing my name below, I, Asha Hayes, attest that this documentation has been prepared under the direction and in presence of Dr. Crystal Mittal on 10/12/20. Electronically signed: Asha Hayes, 10/12/20, 2:49 PM      Asha Hayes    Please note that this dictation was completed with [employer], the [employer] voice recognition software. Quite often unanticipated grammatical, syntax, homophones, and other interpretive errors are inadvertently transcribed by the computer software. Please disregard these errors. Please excuse any errors that have escaped final proofreading. Thank you.

## 2020-10-27 RX ORDER — DICYCLOMINE HYDROCHLORIDE 10 MG/1
CAPSULE ORAL
Qty: 120 CAP | Refills: 1 | Status: SHIPPED | OUTPATIENT
Start: 2020-10-27 | End: 2021-04-15

## 2020-11-03 RX ORDER — SUCRALFATE 1 G/1
TABLET ORAL
Qty: 120 TAB | Refills: 1 | Status: SHIPPED | OUTPATIENT
Start: 2020-11-03 | End: 2020-11-03

## 2020-11-03 RX ORDER — SUCRALFATE 1 G/1
TABLET ORAL
Qty: 360 TAB | Refills: 2 | Status: SHIPPED | OUTPATIENT
Start: 2020-11-03 | End: 2021-05-06

## 2020-12-22 RX ORDER — DEXLANSOPRAZOLE 60 MG/1
CAPSULE, DELAYED RELEASE ORAL
Qty: 30 CAP | Refills: 5 | Status: SHIPPED | OUTPATIENT
Start: 2020-12-22 | End: 2021-08-24 | Stop reason: SDUPTHER

## 2020-12-24 ENCOUNTER — VIRTUAL VISIT (OUTPATIENT)
Dept: FAMILY MEDICINE CLINIC | Age: 65
End: 2020-12-24
Payer: MEDICARE

## 2020-12-24 DIAGNOSIS — R42 DIZZINESS: ICD-10-CM

## 2020-12-24 DIAGNOSIS — N39.0 URINARY TRACT INFECTION WITHOUT HEMATURIA, SITE UNSPECIFIED: ICD-10-CM

## 2020-12-24 DIAGNOSIS — E03.9 HYPOTHYROIDISM, UNSPECIFIED TYPE: Primary | ICD-10-CM

## 2020-12-24 DIAGNOSIS — E03.9 HYPOTHYROIDISM, UNSPECIFIED TYPE: ICD-10-CM

## 2020-12-24 PROCEDURE — 99443 PR PHYS/QHP TELEPHONE EVALUATION 21-30 MIN: CPT | Performed by: FAMILY MEDICINE

## 2020-12-24 RX ORDER — MECLIZINE HYDROCHLORIDE 25 MG/1
25 TABLET ORAL
Qty: 21 TAB | Refills: 0 | Status: SHIPPED | OUTPATIENT
Start: 2020-12-24 | End: 2021-02-17

## 2020-12-24 RX ORDER — CEPHALEXIN 500 MG/1
500 CAPSULE ORAL 4 TIMES DAILY
Qty: 40 CAP | Refills: 0 | Status: SHIPPED | OUTPATIENT
Start: 2020-12-24 | End: 2021-01-03

## 2020-12-24 NOTE — PROGRESS NOTES
States her thyroid levels were high when she was in hospital. She went to dermatologist yesterday and her skin was very dry and she would like her thyroid rechecked. Also burning with urination and needs order to get urine checked. Devaughn Valentine presents today for   Chief Complaint   Patient presents with    Thyroid Problem    Urinary Pain       Depression Screening:  3 most recent PHQ Screens 12/24/2020   Little interest or pleasure in doing things Several days   Feeling down, depressed, irritable, or hopeless Several days   Total Score PHQ 2 2       Learning Assessment:  Learning Assessment 10/9/2020   PRIMARY LEARNER Patient   HIGHEST LEVEL OF EDUCATION - PRIMARY LEARNER  DID NOT GRADUATE HIGH SCHOOL   BARRIERS PRIMARY LEARNER NONE   PRIMARY LANGUAGE ENGLISH   LEARNER PREFERENCE PRIMARY READING   ANSWERED BY patient   RELATIONSHIP SELF       Fall Risk  Fall Risk Assessment, last 12 mths 12/24/2020   Able to walk? Yes   Fall in past 12 months? 0   Do you feel unsteady? 0   Are you worried about falling 0   Number of falls in past 12 months -   Fall with injury? -       ADL  ADL Assessment 12/24/2020   Feeding yourself No Help Needed   Getting from bed to chair No Help Needed   Getting dressed No Help Needed   Bathing or showering No Help Needed   Walk across the room (includes cane/walker) No Help Needed   Using the telphone No Help Needed   Taking your medications No Help Needed   Preparing meals No Help Needed   Managing money (expenses/bills) No Help Needed   Moderately strenuous housework (laundry) No Help Needed   Shopping for personal items (toiletries/medicines) No Help Needed   Shopping for groceries No Help Needed   Driving No Help Needed   Climbing a flight of stairs No Help Needed   Getting to places beyond walking distances No Help Needed       Health Maintenance reviewed and discussed and ordered per Provider.     Health Maintenance Due   Topic Date Due    Hepatitis C Screening  1955    DTaP/Tdap/Td series (1 - Tdap) 07/03/1978    PAP AKA CERVICAL CYTOLOGY  07/03/1978    Shingrix Vaccine Age 50> (1 of 2) 07/03/2007    Pneumococcal 0-64 years (3 of 3 - PCV13) 11/13/2017    Medicare Yearly Exam  05/13/2020   . Coordination of Care:  1. Have you been to the ER, urgent care clinic since your last visit? Hospitalized since your last visit? no    2. Have you seen or consulted any other health care providers outside of the [address] [place] [address] since your last visit? Include any pap smears or colon screening.  no

## 2020-12-24 NOTE — PROGRESS NOTES
Seng Baer is a 63 y.o. female, evaluated via audio-only technology on 12/24/2020 for Thyroid Problem and Urinary Pain  . Assessment & Plan: Dizziness, hypothyroid, UTI: This was a 25-minute visit with voice to voice communication the patient was at her home I was in my office her story is very unusual they told her she had thyroid problems in the hospital but that is all they told her they did not give her any medicine they told her we would fix that but she did not really know what kind of problem she had she thinks she is having a urinary tract disorder starting back she also has had some dizziness with true vertigo. She has had no falls or injuries. She has been eating relatively well. Nobody at home has been sick I am going to get a urine culture a CMP is CBC and a TSH the patient was at home during our discussion I was in my office will cover her with antibiotic therapy until we get a culture and sensitivity returned. Quite upset that they did not address her thyroid in the hospital and I agree with her. 12  Subjective: Amber Boyd is a 74-year-old female who is seen for evaluation. She is seen with telephone to telephone interaction she has been hospitalized almost monthly frequently for urinary tract infections she is status post her second renal transplant and is followed aggressively by nephrology. She has no falls or injury she has a multitude of diagnoses she is insistent that at her last hospitalization they told her her thyroid was not working well but they did not give her medicine and they told her we would have to take care of that. No falls or injuries no rash no syncope no loss of consciousness. Bowel movements have been appropriate. Has been eating relatively well. Nobody at home has been sick chest pain or shortness of breath she has had some dizziness recently with spinning sensation no vomiting or diarrhea associated. No rashes.   She does feel like she is beginning to get a urinary tract infection again and she is followed by nephrology for that on a regular basis. She has had no chest pain or shortness of breath she sleeps relatively well she is eating appropriately as well. As per family members nobody at home has been sick she has been relatively safe from Covid       Prior to Admission medications    Medication Sig Start Date End Date Taking? Authorizing Provider   Dexilant 60 mg CpDB capsule (delayed release) TAKE ONE CAPSULE BY MOUTH EVERY DAY 12/22/20  Yes Della Felty, MD   sucralfate (CARAFATE) 1 gram tablet TAKE 1 TABLET BY MOUTH FOUR TIMES DAILY 11/3/20  Yes Della Felty, MD   dicyclomine (BENTYL) 10 mg capsule TAKE 1 CAPSULE BY MOUTH FOUR TIMES DAILY 10/27/20  Yes Della Felty, MD   warfarin (COUMADIN) 2 mg tablet Take 4 mg by mouth daily. 9/24/20  Yes Other, MD Abelardo   amLODIPine (NORVASC) 10 mg tablet TAKE 1 TABLET BY MOUTH EVERY DAY 9/22/20  Yes Della Felty, MD   omeprazole (PRILOSEC) 20 mg capsule TAKE ONE CAPSULE BY MOUTH TWICE DAILY 9/22/20  Yes Della Felty, MD   metoprolol tartrate (LOPRESSOR) 50 mg tablet Take 50 mg by mouth daily. 2/22/19  Yes Marleni, MD Abelardo   aluminum & magnesium hydroxide-simethicone (Maalox Maximum Strength) 400-400-40 mg/5 mL suspension Take 10 mL by mouth every six (6) hours as needed for Indigestion. 9/17/20  Yes Mila Dominguez MD   simvastatin (ZOCOR) 20 mg tablet TAKE 1 TABLET BY MOUTH DAILY AS DIRECTED. 8/23/20  Yes Della Felty, MD   amiodarone (CORDARONE) 200 mg tablet Take 200 mg by mouth daily. 2/22/19  Yes Provider, Historical   ESTRACE 0.01 % (0.1 mg/gram) vaginal cream INSERT 2 GRAMS INTO VAGINA EVERY MONDAY AND THURSDAY 4/11/17  Yes Corie Gonzalez MD   cranberry extract 425 mg cap 425 mg. 1/17/14  Yes Provider, Historical   LORazepam (ATIVAN) 0.5 mg tablet 0.5 mg.   Yes Provider, Historical   fluticasone (FLONASE) 50 mcg/actuation nasal spray 1 [place].    Yes Provider, Historical   EPINEPHrine (EPIPEN) 0.3 mg/0.3 mL injection 0.3 mg. 10/1/16  Yes Provider, Historical   hyoscyamine SL (LEVSIN/SL) 0.125 mg SL tablet 0.125 mg by SubLINGual route every four (4) hours as needed. Yes Provider, Historical   albuterol (PROVENTIL VENTOLIN) 2.5 mg /3 mL (0.083 %) nebulizer solution by Nebulization route as needed. Yes Provider, Historical   fluticasone-salmeterol (ADVAIR DISKUS) 250-50 mcg/dose diskus inhaler Take 1 Puff by inhalation every twelve (12) hours. Yes Provider, Historical   ondansetron (ZOFRAN ODT) 8 mg disintegrating tablet 1 p.o. every 6 to 8 hours for nausea and vomiting this does not need to be a tablet that orally dissolves. 10/9/20   Yenni Santoro MD   montelukast (SINGULAIR) 10 mg tablet Take 10 mg by mouth daily. Provider, Historical     Patient Active Problem List   Diagnosis Code    History of kidney transplant Z94.0    CMV (cytomegalovirus) antibody positive R89.4    Hematuria R31.9    Fecal incontinence R15.9    Drug-induced hyperglycemia R73.9, T50.905A    Diverticulitis of intestine K57.92    Anemia D64.9    Acute renal failure ([facility] [address] N17.9    Asthma J45.909    Exacerbation of asthma J45. 901    Chronic kidney disease, stage III (moderate) N18.30    Chronic obstructive pulmonary disease (HCC) J44.9    Cystic disease of kidney Q61.9    Diverticular disease of large intestine K57.30    Essential hypertension I10    Fever R50.9    Gastroenteritis K52.9    Gastroesophageal reflux disease K21.9    Hay fever J30.1    Hyperlipidemia E78.5    Hyperkalemia E87.5    Disease due to gram-negative bacillus B96.89    Kidney transplant rejection T86.11    Migraine without status migrainosus, not intractable G43.909    Nausea and vomiting R11.2    Adiposity E66.9    Pruritus L29.9    Recurrent urinary tract infection N39.0    Urinary tract infection without hematuria N39.0    Renal transplant disorder T86.10    Systemic infection (HCC) A41.9    Systemic inflammatory response syndrome (SIRS) ([facility]) R65.10    Ulcer [id number]    Renal cyst N28.1    Obesity E66.9    Migraine G43.909    Hypertension I10    Hypercholesteremia E78.00    GERD (gastroesophageal reflux disease) K21.9    Burning with urination R30.0    Arrhythmia I49.9    Acute recurrent maxillary sinusitis J01.01    Calculus of gallbladder with acute cholecystitis without obstruction K80.00    Hypothyroidism E03.9    Dizziness R42       Review of Systems   Constitutional: Negative. HENT: Negative. Eyes: Negative. Respiratory: Negative. Cardiovascular: Negative. Gastrointestinal: Negative. Genitourinary: Positive for dysuria and urgency. Musculoskeletal: Positive for back pain. Skin: Negative. Neurological: Positive for dizziness. Psychiatric/Behavioral: Negative. Patient-Reported Vitals 8/6/2020   Patient-Reported Weight 150   Patient-Reported Height 7'6\"        Saul Love, who was evaluated through a patient-initiated, synchronous (real-time) audio only encounter, and/or her healthcare decision maker, is aware that it is a billable service, with coverage as determined by her insurance carrier. She provided verbal consent to proceed: n/a- consent obtained within past 12 months. She has not had a related appointment within my department in the past 7 days or scheduled within the next 24 hours.       Total Time: minutes: 21-30 minutes    Benja Castro MD

## 2020-12-28 ENCOUNTER — APPOINTMENT (OUTPATIENT)
Dept: CT IMAGING | Age: 65
DRG: 641 | End: 2020-12-28
Attending: FAMILY MEDICINE
Payer: MEDICARE

## 2020-12-28 ENCOUNTER — HOSPITAL ENCOUNTER (INPATIENT)
Age: 65
LOS: 1 days | Discharge: HOME | DRG: 641 | End: 2020-12-29
Attending: FAMILY MEDICINE | Admitting: INTERNAL MEDICINE
Payer: MEDICARE

## 2020-12-28 DIAGNOSIS — R42 VERTIGO: ICD-10-CM

## 2020-12-28 DIAGNOSIS — Z99.2 ESRD (END STAGE RENAL DISEASE) ON DIALYSIS (HCC): ICD-10-CM

## 2020-12-28 DIAGNOSIS — E87.5 ACUTE HYPERKALEMIA: Primary | ICD-10-CM

## 2020-12-28 DIAGNOSIS — N18.6 ESRD (END STAGE RENAL DISEASE) ON DIALYSIS (HCC): ICD-10-CM

## 2020-12-28 PROBLEM — Z86.718 HISTORY OF DVT (DEEP VEIN THROMBOSIS): Status: ACTIVE | Noted: 2020-12-28

## 2020-12-28 LAB
ALBUMIN SERPL-MCNC: 3.9 G/DL (ref 3.5–4.7)
ALBUMIN/GLOB SERPL: 1 {RATIO}
ALP SERPL-CCNC: 138 U/L (ref 38–126)
ALT SERPL-CCNC: 56 U/L (ref 3–52)
ANION GAP SERPL CALC-SCNC: 10 MMOL/L
ANION GAP SERPL CALC-SCNC: 11 MMOL/L
AST SERPL W P-5'-P-CCNC: 67 U/L (ref 14–74)
ATRIAL RATE: 78 BPM
BASOPHILS # BLD: 0 K/UL (ref 0–0.1)
BASOPHILS NFR BLD: 0 % (ref 0–2)
BILIRUB SERPL-MCNC: 0.7 MG/DL (ref 0.2–1)
BUN SERPL-MCNC: 73 MG/DL (ref 9–21)
BUN SERPL-MCNC: 74 MG/DL (ref 9–21)
BUN/CREAT SERPL: 6
BUN/CREAT SERPL: 7
CA-I BLD-MCNC: 7.8 MG/DL (ref 8.5–10.5)
CA-I BLD-MCNC: 8.1 MG/DL (ref 8.5–10.5)
CALCULATED P AXIS, ECG09: 93 DEGREES
CALCULATED R AXIS, ECG10: 99 DEGREES
CALCULATED T AXIS, ECG11: 63 DEGREES
CHLORIDE SERPL-SCNC: 111 MMOL/L (ref 94–111)
CHLORIDE SERPL-SCNC: 112 MMOL/L (ref 94–111)
CO2 SERPL-SCNC: 15 MMOL/L (ref 21–33)
CO2 SERPL-SCNC: 15 MMOL/L (ref 21–33)
CREAT SERPL-MCNC: 11.3 MG/DL (ref 0.7–1.2)
CREAT SERPL-MCNC: 11.35 MG/DL (ref 0.7–1.2)
DIAGNOSIS, 93000: NORMAL
EOSINOPHIL # BLD: 0.3 K/UL (ref 0–0.4)
EOSINOPHIL NFR BLD: 4 % (ref 0–5)
ERYTHROCYTE [DISTWIDTH] IN BLOOD BY AUTOMATED COUNT: 16.7 % (ref 11.6–14.5)
GLOBULIN SER CALC-MCNC: 3.8 G/DL
GLUCOSE SERPL-MCNC: 126 MG/DL (ref 70–110)
GLUCOSE SERPL-MCNC: 138 MG/DL (ref 70–110)
HCT VFR BLD AUTO: 36.5 % (ref 35–45)
HGB BLD-MCNC: 11.7 G/DL (ref 12–16)
IMM GRANULOCYTES # BLD AUTO: 0 K/UL
IMM GRANULOCYTES NFR BLD AUTO: 0 %
LIPASE SERPL-CCNC: 44 U/L (ref 10–57)
LYMPHOCYTES # BLD: 1.7 K/UL (ref 0.9–3.6)
LYMPHOCYTES NFR BLD: 24 % (ref 21–52)
MCH RBC QN AUTO: 33.1 PG (ref 24–34)
MCHC RBC AUTO-ENTMCNC: 32.1 G/DL (ref 31–37)
MCV RBC AUTO: 103.4 FL (ref 74–97)
MONOCYTES # BLD: 0.2 K/UL (ref 0.05–1.2)
MONOCYTES NFR BLD: 3 % (ref 3–10)
NEUTS SEG # BLD: 4.9 K/UL (ref 1.8–8)
NEUTS SEG NFR BLD: 69 % (ref 40–73)
P-R INTERVAL, ECG05: 215 MS
PLATELET # BLD AUTO: 312 K/UL (ref 135–420)
PMV BLD AUTO: 9.9 FL (ref 9.2–11.8)
POTASSIUM SERPL-SCNC: 6.3 MMOL/L (ref 3.2–5.1)
POTASSIUM SERPL-SCNC: 6.5 MMOL/L (ref 3.2–5.1)
PROT SERPL-MCNC: 7.7 G/DL (ref 6.1–8.4)
Q-T INTERVAL, ECG07: 408 MS
QRS DURATION, ECG06: 98 MS
QTC CALCULATION (BEZET), ECG08: 465 MS
RBC # BLD AUTO: 3.53 M/UL (ref 4.2–5.3)
SODIUM SERPL-SCNC: 137 MMOL/L (ref 135–145)
SODIUM SERPL-SCNC: 137 MMOL/L (ref 135–145)
VENTRICULAR RATE, ECG03: 78 BPM
WBC # BLD AUTO: 7.1 K/UL (ref 4.6–13.2)

## 2020-12-28 PROCEDURE — 74011000250 HC RX REV CODE- 250: Performed by: FAMILY MEDICINE

## 2020-12-28 PROCEDURE — 74011250637 HC RX REV CODE- 250/637: Performed by: FAMILY MEDICINE

## 2020-12-28 PROCEDURE — 74011250636 HC RX REV CODE- 250/636: Performed by: NURSE PRACTITIONER

## 2020-12-28 PROCEDURE — 80048 BASIC METABOLIC PNL TOTAL CA: CPT

## 2020-12-28 PROCEDURE — 83690 ASSAY OF LIPASE: CPT

## 2020-12-28 PROCEDURE — 93005 ELECTROCARDIOGRAM TRACING: CPT

## 2020-12-28 PROCEDURE — 85025 COMPLETE CBC W/AUTO DIFF WBC: CPT

## 2020-12-28 PROCEDURE — 74011250637 HC RX REV CODE- 250/637: Performed by: NURSE PRACTITIONER

## 2020-12-28 PROCEDURE — 65270000029 HC RM PRIVATE

## 2020-12-28 PROCEDURE — 80053 COMPREHEN METABOLIC PANEL: CPT

## 2020-12-28 PROCEDURE — 74011636637 HC RX REV CODE- 636/637: Performed by: FAMILY MEDICINE

## 2020-12-28 PROCEDURE — 5A1D70Z PERFORMANCE OF URINARY FILTRATION, INTERMITTENT, LESS THAN 6 HOURS PER DAY: ICD-10-PCS | Performed by: INTERNAL MEDICINE

## 2020-12-28 PROCEDURE — 74176 CT ABD & PELVIS W/O CONTRAST: CPT

## 2020-12-28 PROCEDURE — 96365 THER/PROPH/DIAG IV INF INIT: CPT

## 2020-12-28 PROCEDURE — 96361 HYDRATE IV INFUSION ADD-ON: CPT

## 2020-12-28 PROCEDURE — 74011000258 HC RX REV CODE- 258: Performed by: FAMILY MEDICINE

## 2020-12-28 PROCEDURE — 99285 EMERGENCY DEPT VISIT HI MDM: CPT

## 2020-12-28 PROCEDURE — 74011250636 HC RX REV CODE- 250/636: Performed by: FAMILY MEDICINE

## 2020-12-28 PROCEDURE — 90935 HEMODIALYSIS ONE EVALUATION: CPT

## 2020-12-28 PROCEDURE — 70450 CT HEAD/BRAIN W/O DYE: CPT

## 2020-12-28 RX ORDER — ACETAMINOPHEN 325 MG/1
650 TABLET ORAL
Status: DISCONTINUED | OUTPATIENT
Start: 2020-12-28 | End: 2020-12-29 | Stop reason: HOSPADM

## 2020-12-28 RX ORDER — SIMVASTATIN 20 MG/1
20 TABLET, FILM COATED ORAL DAILY
Status: DISCONTINUED | OUTPATIENT
Start: 2020-12-29 | End: 2020-12-29 | Stop reason: CLARIF

## 2020-12-28 RX ORDER — SODIUM POLYSTYRENE SULFONATE 15 G/60ML
30 SUSPENSION ORAL; RECTAL
Status: COMPLETED | OUTPATIENT
Start: 2020-12-28 | End: 2020-12-28

## 2020-12-28 RX ORDER — BUDESONIDE AND FORMOTEROL FUMARATE DIHYDRATE 160; 4.5 UG/1; UG/1
2 AEROSOL RESPIRATORY (INHALATION)
Status: DISCONTINUED | OUTPATIENT
Start: 2020-12-29 | End: 2020-12-29 | Stop reason: HOSPADM

## 2020-12-28 RX ORDER — FLUTICASONE PROPIONATE 50 MCG
1 SPRAY, SUSPENSION (ML) NASAL DAILY
Status: DISCONTINUED | OUTPATIENT
Start: 2020-12-29 | End: 2020-12-29 | Stop reason: HOSPADM

## 2020-12-28 RX ORDER — MECLIZINE HCL 12.5 MG 12.5 MG/1
25 TABLET ORAL
Status: DISCONTINUED | OUTPATIENT
Start: 2020-12-28 | End: 2020-12-29 | Stop reason: HOSPADM

## 2020-12-28 RX ORDER — METOPROLOL TARTRATE 50 MG/1
50 TABLET ORAL DAILY
Status: DISCONTINUED | OUTPATIENT
Start: 2020-12-29 | End: 2020-12-29 | Stop reason: HOSPADM

## 2020-12-28 RX ORDER — MONTELUKAST SODIUM 10 MG/1
10 TABLET ORAL DAILY
Status: DISCONTINUED | OUTPATIENT
Start: 2020-12-29 | End: 2020-12-29 | Stop reason: HOSPADM

## 2020-12-28 RX ORDER — AMLODIPINE BESYLATE 5 MG/1
10 TABLET ORAL DAILY
Status: DISCONTINUED | OUTPATIENT
Start: 2020-12-29 | End: 2020-12-29 | Stop reason: HOSPADM

## 2020-12-28 RX ORDER — ACETAMINOPHEN 650 MG/1
650 SUPPOSITORY RECTAL
Status: DISCONTINUED | OUTPATIENT
Start: 2020-12-28 | End: 2020-12-29 | Stop reason: HOSPADM

## 2020-12-28 RX ORDER — WARFARIN 2 MG/1
4 TABLET ORAL DAILY
Status: DISCONTINUED | OUTPATIENT
Start: 2020-12-29 | End: 2020-12-29 | Stop reason: HOSPADM

## 2020-12-28 RX ORDER — DEXTROSE 50 % IN WATER (D50W) INTRAVENOUS SYRINGE
25
Status: COMPLETED | OUTPATIENT
Start: 2020-12-28 | End: 2020-12-28

## 2020-12-28 RX ORDER — AMIODARONE HYDROCHLORIDE 200 MG/1
200 TABLET ORAL DAILY
Status: DISCONTINUED | OUTPATIENT
Start: 2020-12-29 | End: 2020-12-29 | Stop reason: HOSPADM

## 2020-12-28 RX ORDER — ONDANSETRON 2 MG/ML
4 INJECTION INTRAMUSCULAR; INTRAVENOUS
Status: DISCONTINUED | OUTPATIENT
Start: 2020-12-28 | End: 2020-12-29 | Stop reason: HOSPADM

## 2020-12-28 RX ORDER — SODIUM CHLORIDE 0.9 % (FLUSH) 0.9 %
5-40 SYRINGE (ML) INJECTION AS NEEDED
Status: DISCONTINUED | OUTPATIENT
Start: 2020-12-28 | End: 2020-12-29 | Stop reason: HOSPADM

## 2020-12-28 RX ORDER — DICYCLOMINE HYDROCHLORIDE 10 MG/1
10 CAPSULE ORAL 4 TIMES DAILY
Status: DISCONTINUED | OUTPATIENT
Start: 2020-12-28 | End: 2020-12-29 | Stop reason: HOSPADM

## 2020-12-28 RX ORDER — POLYETHYLENE GLYCOL 3350 17 G/17G
17 POWDER, FOR SOLUTION ORAL DAILY PRN
Status: DISCONTINUED | OUTPATIENT
Start: 2020-12-28 | End: 2020-12-29 | Stop reason: HOSPADM

## 2020-12-28 RX ORDER — CEPHALEXIN 250 MG/1
500 CAPSULE ORAL EVERY 12 HOURS
Status: DISCONTINUED | OUTPATIENT
Start: 2020-12-28 | End: 2020-12-29 | Stop reason: HOSPADM

## 2020-12-28 RX ORDER — BUDESONIDE AND FORMOTEROL FUMARATE DIHYDRATE 160; 4.5 UG/1; UG/1
2 AEROSOL RESPIRATORY (INHALATION)
Status: DISCONTINUED | OUTPATIENT
Start: 2020-12-28 | End: 2020-12-28

## 2020-12-28 RX ADMIN — SODIUM POLYSTYRENE SULFONATE 30 G: 15 SUSPENSION ORAL; RECTAL at 16:44

## 2020-12-28 RX ADMIN — PROMETHAZINE HYDROCHLORIDE 25 MG: 25 INJECTION INTRAMUSCULAR; INTRAVENOUS at 14:00

## 2020-12-28 RX ADMIN — CALCIUM GLUCONATE 1 G: 98 INJECTION, SOLUTION INTRAVENOUS at 17:10

## 2020-12-28 RX ADMIN — ACETAMINOPHEN 650 MG: 325 TABLET ORAL at 20:04

## 2020-12-28 RX ADMIN — ONDANSETRON 4 MG: 2 INJECTION INTRAMUSCULAR; INTRAVENOUS at 20:04

## 2020-12-28 RX ADMIN — INSULIN HUMAN 10 UNITS: 100 INJECTION, SOLUTION PARENTERAL at 17:07

## 2020-12-28 RX ADMIN — SODIUM CHLORIDE 1000 ML: 9 INJECTION, SOLUTION INTRAVENOUS at 14:01

## 2020-12-28 RX ADMIN — DEXTROSE MONOHYDRATE 25 G: 25 INJECTION, SOLUTION INTRAVENOUS at 17:03

## 2020-12-28 NOTE — PROGRESS NOTES
Received patient from ED via stretcher to room 245. Pt placed on telemetry box #13 showing NSR on monitor. A&Ox4. Patient able to ambulate to bed. Muna Maradiaga NP at bedside. Report given to night shift nurse.

## 2020-12-28 NOTE — ASSESSMENT & PLAN NOTE
Dialysis Monday & Fridays  Dr. Floyd Fernandez Nephrologist  Kidney transplant in 2002  B&C 73 & 11.3 on admission  B&C 21 & 4.8 on discharge  Baseline unk  Dialyzed 12/28/2020

## 2020-12-28 NOTE — ED TRIAGE NOTES
Pt states that she has been dizzy for 3 days, has stomach pain that she was to have surgery done for gallstones and MD have cancelled, pt also c/o pain in her right arm and refuses to keep on b/p cuff, she also mentioned that she has spoke to PCP who gave her medicine for UTI and dizziness last Monday and it is not getting better

## 2020-12-28 NOTE — H&P
History and Physical    Patient: Margot Alonso MRN: [id number]      YOB: 1955  Age: 63 y.o. Sex: female      Subjective:      Margot Alonso is a 63 y.o. female who lives with her sister has a  significant PMHx of asthma, IBS, HTN, HLD, obesity and S/P kidney transplant 2002 and  migraines presents to the ED with complaints of dizziness and lower abdominal pain. Pt spoke with her PCP one week ago and was dx with a UTI and vertigo, prescribed abx and meclizine. Pt has taken medications as prescribed w/o relief, prompting her visit. Pt states she feels dizzy, described as feeling off-balance, worse with movement. She also notes subjective fever, near-syncope, nausea/vomiting (2 episodes last night, 2 this morning) and lower abdominal pain rated 9/10. Pt was scheduled to have gallstones removed, but was notified this morning that the surgery was cancelled. In ED EKG yields NSR @78BPM, CT of head was unremarkable, CT of Abd/Pel revealed   Mild inflammatory stranding adjacent to the pancreas and second/third   portions of the duodenum as above. Cholelithiasis without evidence of cholecystitis  Right lower quadrant transplant kidney with intrarenal calcifications. No   hydronephrosis   Multiple colonic diverticula without evidence of diverticulitis. Underdistended urinary bladder which may relate to apparent urinary bladder   wall thickening     B&C elevated at 73 & 11.30 K+ elevated at 6.5, UA negative. Pt examined and seen at bedside. Alert & oriented X3 with no acute distress, discomfort or pain. Reports abd pain 9/10 in right upper abd possible due to IBS/cholelithiasis. VSS and satting >94% on RA. No acute event reported during admissions process. Left arm AV fistula + thrill & bruit. Dr. Grzegorz Kaminski contact for HD orders. Pt reports day 3/10 of Keflex for UTI. Will resume treatment. Pt takes 4mg Coumadin daily for hx of blood clots. Will resume.      Denies any known COVID exposures. Denies any tobacco, ETOH or recreational drug use. Code Status: Full Code  DVT: Coumadin 4mg daily  Medical Proxy: Read Juan Antonio [phone]    Discussed admission with patient and patient agrees with all questions answered    Approximate time spent on admission was 45 minutes. Patient will be admitted for Hyperkalemia [E87.5]  ESRD (end stage renal disease) [facility] [N18.6, Z99.2]  to hospitalist service as Observation and evaluated daily via physical assessment, diagnostic testing, and laboratory assessment.      Past Medical History:   Diagnosis Date    Anemia NEC     Arrhythmia     Asthma     Asthma     Burning with urination     frequent uti    CMV (cytomegalovirus) antibody positive     Dyspepsia and other specified disorders of function of stomach     stomach ulcer    Essential hypertension     GERD (gastroesophageal reflux disease)     Hypercholesteremia     Hypertension     Migraine     Obesity     Renal cyst 2002    kidney transplant     Ulcer      Past Surgical History:   Procedure Laterality Date    ENDOSCOPY, COLON, DIAGNOSTIC      with Dr. Flex Simth    HX GI      ulcer    HX HEENT      sinus surg    HX RENAL TRANSPLANT      VASCULAR SURGERY PROCEDURE UNLIST      shunt in prep for dialysis      Family History   Problem Relation Age of Onset    Colon Polyps Brother      Social History     Tobacco Use    Smoking status: Never Smoker    Smokeless tobacco: Never Used   Substance Use Topics    Alcohol use: No      Allergies   Allergen Reactions    Aspirin Unknown (comments) and Rash    Bactrim [Sulfamethoxazole-Trimethoprim] Unknown (comments) and Rash    Bromfenac Unknown (comments) and Rash    Ceftriaxone Rash    Ibuprofen Unknown (comments) and Rash    Ketorolac Tromethamine Unknown (comments) and Rash    Morphine Unknown (comments) and Rash    Relafen [Nabumetone] Unknown (comments) and Rash    Rifampin Unknown (comments) and Rash    Vancomycin Unknown (comments) and Rash     Pt reports causes itching, takes benadryl prior to use. Pt denies anaphylaxis. Requires benadryl with each vancomycin dose     Immunizations are UTD per pt. Review of Systems:  - CONSTITUTIONAL: Denies fatigue, weight loss, fever and chills. - HEENT: Reports blurred vision Denies changes in and hearing.    - RESPIRATORY: Denies SOB and cough. - CV: Denies palpitations and CP.     - GI: Reports abdominal pain, nausea, vomiting, Denies diarrhea and constipation. Last BM 12/27/2020    - : Denies dysuria and urinary frequency. Voiding spontaneously. - MSK: Denies myalgia and joint pain. - SKIN: Denies rash and pruritus.    - NEUROLOGICAL: Reports severe dizziness, weakness, headache and syncope. - PSYCHIATRIC: Denies recent changes in mood. Denies anxiety and depression. Objective:     Vitals:    12/28/20 1243   Pulse: 92   Resp: 16   Temp: 99.2 °F (37.3 °C)   SpO2: 99%   Weight: 73.9 kg (163 lb)   Height: 5' 2\" (1.575 m)        Physical Exam:  - [place] [employer]. Alert and oriented x 3. No acute distress. Well-nourished.      - HEENT: EOMI. Anicteric. PERRLA,Moist mucous membranes. No scleral icterus. No cervical lymphadenopathy. Oropharynx moist without any lesions    -NECK: Supple, no tracheal deviation, no JVD, no significant lymphadenopathy, no thyromegaly noted. - LUNGS: Clear to auscultation bilaterally. No accessory muscle use. Chest symmetrical, No wheezing, rales, rhonchi noted. Appropriate respiratory effort.     - CARDIOVASCULAR:Left arm AV fistula + thrill & bruit. Regular rate and rhythm. No murmur, rubs, gallops, No edema appreciated. S1 & S2 audible. - ABDOMEN: Soft, obese non-tender and obesely distended. No palpable masses. , lesions, hepatomegaly. Bowels active X4 quadrants. - SKIN: Warm, dry, intact, no bruising, lesions, or rashes noted.  Color appropriate for ethnicity.     - MUSCULOSKELETAL: Ambulates independently, no deformities, Full ROM     - NEUROLOGIC: No focal neurological deficits. CN II-XII grossly intact, Muscle strength 5/5, both U & L bilateral DTR in lower extremities 2+. - PSYCHIATRIC: Calm & Cooperative. Appropriate mood and affect. Recent Results (from the past 12 hour(s))   CBC WITH AUTOMATED DIFF    Collection Time: 12/28/20  1:35 PM   Result Value Ref Range    WBC 7.1 4.6 - 13.2 K/uL    RBC 3.53 (L) 4.20 - 5.30 M/uL    HGB 11.7 (L) 12.0 - 16.0 g/dL    HCT 36.5 35.0 - 45.0 %    .4 (H) 74.0 - 97.0 FL    MCH 33.1 24.0 - 34.0 PG    MCHC 32.1 31.0 - 37.0 g/dL    RDW 16.7 (H) 11.6 - 14.5 %    PLATELET 738 434 - 986 K/uL    MPV 9.9 9.2 - 11.8 FL    NEUTROPHILS 69 40 - 73 %    LYMPHOCYTES 24 21 - 52 %    MONOCYTES 3 3 - 10 %    EOSINOPHILS 4 0 - 5 %    BASOPHILS 0 0 - 2 %    IMMATURE GRANULOCYTES 0 %    ABS. NEUTROPHILS 4.9 1.8 - 8.0 K/UL    ABS. LYMPHOCYTES 1.7 0.9 - 3.6 K/UL    ABS. MONOCYTES 0.2 0.05 - 1.2 K/UL    ABS. EOSINOPHILS 0.3 0.0 - 0.4 K/UL    ABS. BASOPHILS 0.0 0.0 - 0.1 K/UL    ABS. IMM. GRANS. 0.0 K/UL   METABOLIC PANEL, COMPREHENSIVE    Collection Time: 12/28/20  1:35 PM   Result Value Ref Range    Sodium 137 135 - 145 mmol/L    Potassium 6.3 (HH) 3.2 - 5.1 mmol/L    Chloride 111 94 - 111 mmol/L    CO2 15 (L) 21 - 33 mmol/L    Anion gap 11 mmol/L    Glucose 138 (H) 70 - 110 mg/dL    BUN 74 (H) 9 - 21 mg/dL    Creatinine 11.35 (H) 0.70 - 1.20 mg/dL    BUN/Creatinine ratio 7      GFR est AA 4 ml/min/1.73m2    GFR est non-AA 3 ml/min/1.73m2    Calcium 8.1 (L) 8.5 - 10.5 mg/dL    Bilirubin, total 0.7 0.2 - 1.0 mg/dL    AST (SGOT) 67 14 - 74 U/L    ALT (SGPT) 56 (H) 3 - 52 U/L    Alk.  phosphatase 138 (H) 38 - 126 U/L    Protein, total 7.7 6.1 - 8.4 g/dL    Albumin 3.9 3.5 - 4.7 g/dL    Globulin 3.8 g/dL    A-G Ratio 1.0     LIPASE    Collection Time: 12/28/20  1:35 PM   Result Value Ref Range    Lipase 44 10 - 57 U/L   METABOLIC PANEL, BASIC    Collection Time: 12/28/20  2:45 PM   Result Value Ref Range    Sodium 137 135 - 145 mmol/L    Potassium 6.5 (HH) 3.2 - 5.1 mmol/L    Chloride 112 (H) 94 - 111 mmol/L    CO2 15 (L) 21 - 33 mmol/L    Anion gap 10 mmol/L    Glucose 126 (H) 70 - 110 mg/dL    BUN 73 (H) 9 - 21 mg/dL    Creatinine 11.30 (H) 0.70 - 1.20 mg/dL    BUN/Creatinine ratio 6      GFR est AA 4 ml/min/1.73m2    GFR est non-AA 3 ml/min/1.73m2    Calcium 7.8 (L) 8.5 - 10.5 mg/dL   EKG, 12 LEAD, INITIAL    Collection Time: 12/28/20  3:00 PM   Result Value Ref Range    Ventricular Rate 78 BPM    Atrial Rate 78 BPM    P-R Interval 215 ms    QRS Duration 98 ms    Q-T Interval 408 ms    QTC Calculation (Bezet) 465 ms    Calculated P Axis 93 degrees    Calculated R Axis 99 degrees    Calculated T Axis 63 degrees    Diagnosis       Sinus rhythm  Borderline prolonged WA interval  Consider left atrial enlargement  Right axis deviation          Assessment/Plan:   History of DVT (deep vein thrombosis)  Continue Coumadin 4mg daily  PT/INR in AM    ESRD (end stage renal disease) on dialysis [facility])  Dialysis Monday & Fridays  Dr. Mallory Jacome Nephrologist  Kidney transplant in 2002  B&C 73 & 11.3 on admission  Baseline unk  Will be dialyzed in AM    Vertigo  Diagnosed with vertigo 1 week ago  Rx'd Meclizine with no improvement  C/O dizziness, imbalance, exacerbated with movement      UTI (urinary tract infection)  Continue Keflex 500mg QID as prescribed.     Hyperkalemia  -K+ 6.5 on admission  -Pravin gluc 1gm administered in ED  -10 units of Humulin ordered in ED  -30gm Kayexalate ordered in ED  -EKG revealed NSR @78bpm  -AM labs  -Dialysis in AM      Hyperlipidemia  Continue Simvastatin 20mg daily    Essential hypertension  Current /73  Continue amlodipine 10mg daily  Monitor BP per unit protocol      Chronic obstructive pulmonary disease ([address]  Continue home duonebs  Supportive care  [facility] as needed  Maintain O2 levels >92%    Asthma  Continue inhalers as prescribed  Maintain O2 saturation > 92%       Signed By: Gabino Marte, NYA     December 28, 2020

## 2020-12-28 NOTE — Clinical Note
Status[de-identified] INPATIENT [101]   Type of Bed: Telemetry [19]   Inpatient Hospitalization Certified Necessary for the Following Reasons: 3.  Patient receiving treatment that can only be provided in an inpatient setting (further clarification in H&P documentation)   Admitting Diagnosis: Hyperkalemia [[id number]]   Admitting Diagnosis: ESRD (end stage renal disease) on dialysis [facility] [[id number]]   Admitting Physician: Columba Natarajan [[id number]]   Attending Physician: Columba Natarajan [[id number]]   Estimated Length of Stay: 2 Midnights   Discharge Plan[de-identified] Home with Office Follow-up

## 2020-12-28 NOTE — ASSESSMENT & PLAN NOTE
Diagnosed with vertigo 1 week ago  Rx'd Meclizine with no improvement  C/O dizziness, imbalance, exacerbated with movement

## 2020-12-28 NOTE — ASSESSMENT & PLAN NOTE
Continue home duonebs  Supportive care  [facility] as needed  Maintain O2 levels >92%

## 2020-12-28 NOTE — ASSESSMENT & PLAN NOTE
-K+ 6.5 on admission  -Pravin gluc 1gm administered in ED  -10 units of Humulin ordered in ED  -30gm Kayexalate ordered in ED  -3.1 at discharge  -40meq K+ ordered X1 dose  -EKG revealed NSR @78bpm  -last dialysis 12/28/2020

## 2020-12-28 NOTE — ED PROVIDER NOTES
EMERGENCY DEPARTMENT HISTORY AND PHYSICAL EXAM      Date: 12/28/2020  Patient Name: Lizzette Portillo    History of Presenting Illness     Chief Complaint   Patient presents with    Dizziness       History Provided By: Patient    HPI: Lizzette Portillo, 63 y.o. female with significant PMHx of asthma, HTN, HLD, obesity and migraines presents to the ED with complaints of dizziness and lower abdominal pain. Pt spoke with her PCP one week ago and was dx with a UTI and vertigo, prescribed abx and meclizine. Pt has taken medications as prescribed w/o relief, prompting her visit. Pt states she feels dizzy, described as feeling off-balance, worse with movement. She also notes subjective fever, near-syncope, nausea/vomiting (2 episodes last night, 2 this morning) and lower abdominal pain rated 9/10. Pt was scheduled to have gallstones removed, but was notified this morning that the surgery was cancelled. She denies any other sx. There are no other complaints, changes, or physical findings at this time. PCP: Ruthy Frederick MD    No current facility-administered medications on file prior to encounter. Current Outpatient Medications on File Prior to Encounter   Medication Sig Dispense Refill    meclizine (ANTIVERT) 25 mg tablet Take 1 Tab by mouth three (3) times daily as needed for Dizziness for up to 10 days. 21 Tab 0    cephALEXin (KEFLEX) 500 mg capsule Take 1 Cap by mouth four (4) times daily for 10 days. 40 Cap 0    Dexilant 60 mg CpDB capsule (delayed release) TAKE ONE CAPSULE BY MOUTH EVERY DAY 30 Cap 5    sucralfate (CARAFATE) 1 gram tablet TAKE 1 TABLET BY MOUTH FOUR TIMES DAILY 360 Tab 2    dicyclomine (BENTYL) 10 mg capsule TAKE 1 CAPSULE BY MOUTH FOUR TIMES DAILY 120 Cap 1    ondansetron (ZOFRAN ODT) 8 mg disintegrating tablet 1 p.o. every 6 to 8 hours for nausea and vomiting this does not need to be a tablet that orally dissolves. 8 Tab 2    warfarin (COUMADIN) 2 mg tablet Take 4 mg by mouth daily.       amLODIPine (NORVASC) 10 mg tablet TAKE 1 TABLET BY MOUTH EVERY DAY 90 Tab 3    omeprazole (PRILOSEC) 20 mg capsule TAKE ONE CAPSULE BY MOUTH TWICE DAILY 180 Cap 2    metoprolol tartrate (LOPRESSOR) 50 mg tablet Take 50 mg by mouth daily.  aluminum & magnesium hydroxide-simethicone (Maalox Maximum Strength) 400-400-40 mg/5 mL suspension Take 10 mL by mouth every six (6) hours as needed for Indigestion. 250 mL 0    simvastatin (ZOCOR) 20 mg tablet TAKE 1 TABLET BY MOUTH DAILY AS DIRECTED. 90 Tab 1    amiodarone (CORDARONE) 200 mg tablet Take 200 mg by mouth daily.  ESTRACE 0.01 % (0.1 mg/gram) vaginal cream INSERT 2 GRAMS INTO VAGINA EVERY MONDAY AND THURSDAY 42.5 g 5    cranberry extract 425 mg cap 425 mg.  LORazepam (ATIVAN) 0.5 mg tablet 0.5 mg.      fluticasone (FLONASE) 50 mcg/actuation nasal spray 1 Spray.  EPINEPHrine (EPIPEN) 0.3 mg/0.3 mL injection 0.3 mg.      hyoscyamine SL (LEVSIN/SL) 0.125 mg SL tablet 0.125 mg by SubLINGual route every four (4) hours as needed.  albuterol (PROVENTIL VENTOLIN) 2.5 mg /3 mL (0.083 %) nebulizer solution by Nebulization route as needed.  fluticasone-salmeterol (ADVAIR DISKUS) 250-50 mcg/dose diskus inhaler Take 1 Puff by inhalation every twelve (12) hours.  montelukast (SINGULAIR) 10 mg tablet Take 10 mg by mouth daily.            Past History     Past Medical History:  Past Medical History:   Diagnosis Date    Anemia NEC     Arrhythmia     Asthma     Asthma     Burning with urination     frequent uti    CMV (cytomegalovirus) antibody positive     Dyspepsia and other specified disorders of function of stomach     stomach ulcer    Essential hypertension     GERD (gastroesophageal reflux disease)     Hypercholesteremia     Hypertension     Migraine     Obesity     Renal cyst 2002    kidney transplant     Ulcer        Past Surgical History:  Past Surgical History:   Procedure Laterality Date    ENDOSCOPY, COLON, DIAGNOSTIC      with Dr. Virgil Camp    HX GI      ulcer    HX HEENT      sinus surg    HX RENAL TRANSPLANT      VASCULAR SURGERY PROCEDURE UNLIST      shunt in prep for dialysis       Family History:  Family History   Problem Relation Age of Onset    Colon Polyps Brother        Social History:  Social History     Tobacco Use    Smoking status: Never Smoker    Smokeless tobacco: Never Used   Substance Use Topics    Alcohol use: No    Drug use: No       Allergies: Allergies   Allergen Reactions    Aspirin Unknown (comments) and Rash    Bactrim [Sulfamethoxazole-Trimethoprim] Unknown (comments) and Rash    Bromfenac Unknown (comments) and Rash    Ceftriaxone Rash    Ibuprofen Unknown (comments) and Rash    Ketorolac Tromethamine Unknown (comments) and Rash    Morphine Unknown (comments) and Rash    Relafen [Nabumetone] Unknown (comments) and Rash    Rifampin Unknown (comments) and Rash    Vancomycin Unknown (comments) and Rash     Pt reports causes itching, takes benadryl prior to use. Pt denies anaphylaxis. Requires benadryl with each vancomycin dose         Review of Systems   Review of Systems   Constitutional: Positive for fever (subjective). Negative for chills and fatigue. HENT: Negative for congestion, ear pain, rhinorrhea and sore throat. Eyes: Negative for pain, redness and visual disturbance. Respiratory: Negative for cough, shortness of breath and wheezing. Cardiovascular: Negative for chest pain and palpitations. Gastrointestinal: Positive for abdominal pain, nausea and vomiting. Negative for diarrhea. Genitourinary: Negative for dysuria, flank pain, frequency and hematuria. Musculoskeletal: Negative for arthralgias, back pain and myalgias. Skin: Negative for color change and rash. Neurological: Positive for dizziness and light-headedness. Negative for weakness and headaches. Physical Exam   Physical Exam  Vitals signs and nursing note reviewed. Constitutional:       General: She is awake. She is not in acute distress. Appearance: Normal appearance. She is well-developed and overweight. She is not ill-appearing, toxic-appearing or diaphoretic. Interventions: Face mask in place. Comments: Middle-aged. HENT:      Head: Normocephalic and atraumatic. Eyes:      Extraocular Movements: Extraocular movements intact. Right eye: Nystagmus present. Left eye: Nystagmus present. Pupils: Pupils are equal, round, and reactive to light. Neck:      Musculoskeletal: Normal range of motion and neck supple. Cardiovascular:      Rate and Rhythm: Normal rate and regular rhythm. Pulses: Normal pulses. Heart sounds: Normal heart sounds. Pulmonary:      Effort: Pulmonary effort is normal.      Breath sounds: Normal breath sounds. Abdominal:      General: Abdomen is flat. Palpations: Abdomen is soft. Tenderness: There is abdominal tenderness (mild) in the right lower quadrant and left lower quadrant. There is no guarding or rebound. Skin:     General: Skin is warm and dry. Neurological:      Mental Status: She is alert and oriented to person, place, and time. GCS: GCS eye subscore is 4. GCS verbal subscore is 5. GCS motor subscore is 6. Psychiatric:         Mood and Affect: Mood normal. Affect is tearful (at times). Behavior: Behavior normal. Behavior is cooperative. Thought Content:  Thought content normal.         Diagnostic Study Results     Labs -     Recent Results (from the past 12 hour(s))   CBC WITH AUTOMATED DIFF    Collection Time: 12/28/20  1:35 PM   Result Value Ref Range    WBC 7.1 4.6 - 13.2 K/uL    RBC 3.53 (L) 4.20 - 5.30 M/uL    HGB 11.7 (L) 12.0 - 16.0 g/dL    HCT 36.5 35.0 - 45.0 %    .4 (H) 74.0 - 97.0 FL    MCH 33.1 24.0 - 34.0 PG    MCHC 32.1 31.0 - 37.0 g/dL    RDW 16.7 (H) 11.6 - 14.5 %    PLATELET 861 644 - 423 K/uL    MPV 9.9 9.2 - 11.8 FL    NEUTROPHILS 69 40 - 73 %    LYMPHOCYTES 24 21 - 52 %    MONOCYTES 3 3 - 10 %    EOSINOPHILS 4 0 - 5 %    BASOPHILS 0 0 - 2 %    IMMATURE GRANULOCYTES 0 %    ABS. NEUTROPHILS 4.9 1.8 - 8.0 K/UL    ABS. LYMPHOCYTES 1.7 0.9 - 3.6 K/UL    ABS. MONOCYTES 0.2 0.05 - 1.2 K/UL    ABS. EOSINOPHILS 0.3 0.0 - 0.4 K/UL    ABS. BASOPHILS 0.0 0.0 - 0.1 K/UL    ABS. IMM. GRANS. 0.0 K/UL   METABOLIC PANEL, COMPREHENSIVE    Collection Time: 12/28/20  1:35 PM   Result Value Ref Range    Sodium 137 135 - 145 mmol/L    Potassium 6.3 (HH) 3.2 - 5.1 mmol/L    Chloride 111 94 - 111 mmol/L    CO2 15 (L) 21 - 33 mmol/L    Anion gap 11 mmol/L    Glucose 138 (H) 70 - 110 mg/dL    BUN 74 (H) 9 - 21 mg/dL    Creatinine 11.35 (H) 0.70 - 1.20 mg/dL    BUN/Creatinine ratio 7      GFR est AA 4 ml/min/1.73m2    GFR est non-AA 3 ml/min/1.73m2    Calcium 8.1 (L) 8.5 - 10.5 mg/dL    Bilirubin, total 0.7 0.2 - 1.0 mg/dL    AST (SGOT) 67 14 - 74 U/L    ALT (SGPT) 56 (H) 3 - 52 U/L    Alk.  phosphatase 138 (H) 38 - 126 U/L    Protein, total 7.7 6.1 - 8.4 g/dL    Albumin 3.9 3.5 - 4.7 g/dL    Globulin 3.8 g/dL    A-G Ratio 1.0     LIPASE    Collection Time: 12/28/20  1:35 PM   Result Value Ref Range    Lipase 44 10 - 57 U/L   METABOLIC PANEL, BASIC    Collection Time: 12/28/20  2:45 PM   Result Value Ref Range    Sodium 137 135 - 145 mmol/L    Potassium 6.5 (HH) 3.2 - 5.1 mmol/L    Chloride 112 (H) 94 - 111 mmol/L    CO2 15 (L) 21 - 33 mmol/L    Anion gap 10 mmol/L    Glucose 126 (H) 70 - 110 mg/dL    BUN 73 (H) 9 - 21 mg/dL    Creatinine 11.30 (H) 0.70 - 1.20 mg/dL    BUN/Creatinine ratio 6      GFR est AA 4 ml/min/1.73m2    GFR est non-AA 3 ml/min/1.73m2    Calcium 7.8 (L) 8.5 - 10.5 mg/dL   EKG, 12 LEAD, INITIAL    Collection Time: 12/28/20  3:00 PM   Result Value Ref Range    Ventricular Rate 78 BPM    Atrial Rate 78 BPM    P-R Interval 215 ms    QRS Duration 98 ms    Q-T Interval 408 ms    QTC Calculation (Bezet) 465 ms    Calculated P Axis 93 degrees    Calculated R Axis 99 degrees    Calculated T Axis 63 degrees    Diagnosis       Sinus rhythm  Borderline prolonged WI interval  Consider left atrial enlargement  Right axis deviation         Radiologic Studies -   CT ABD PELV WO CONT   Final Result   IMPRESSION:      1. Mild inflammatory stranding adjacent to the pancreas and second/third   portions of the duodenum as above.      > Overall imaging appearance is nonspecific and may relate to reactive   duodenitis or pancreatitis. No pancreatic ductal dilatation or evidence of   suggest pancreatic necrosis. > Cholelithiasis without evidence of cholecystitis. 2. Right lower quadrant transplant kidney with intrarenal calcifications. No   hydronephrosis. 3. Multiple colonic diverticula without evidence of diverticulitis. 4. Underdistended urinary bladder which may relate to apparent urinary bladder   wall thickening. Mild perivesicular stranding noted. Correlation for cystitis   maybe helpful as appropriate clinically. CT HEAD WO CONT   Final Result   IMPRESSION:         1. No acute intracranial abnormality. 2. Mild periventricular matter low-attenuation; nonspecific and favored to   reflect sequela of chronic ischemic microvascular change         CT Results  (Last 48 hours)               12/28/20 1319  CT ABD PELV WO CONT Final result    Impression:  IMPRESSION:       1. Mild inflammatory stranding adjacent to the pancreas and second/third   portions of the duodenum as above.      > Overall imaging appearance is nonspecific and may relate to reactive   duodenitis or pancreatitis. No pancreatic ductal dilatation or evidence of   suggest pancreatic necrosis. > Cholelithiasis without evidence of cholecystitis. 2. Right lower quadrant transplant kidney with intrarenal calcifications. No   hydronephrosis. 3. Multiple colonic diverticula without evidence of diverticulitis.        4. Underdistended urinary bladder which may relate to apparent urinary bladder   wall thickening. Mild perivesicular stranding noted. Correlation for cystitis   maybe helpful as appropriate clinically. Narrative:  EXAM: CT of the abdomen and pelvis       INDICATION: Lower abdominal pain, history of Clostridium difficile enterocolitis       COMPARISON: CT scan performed 10/4/2020       TECHNIQUE: Axial CT imaging of the abdomen and pelvis was performed without   intravenous contrast. Multiplanar reformats were generated. One or more dose reduction techniques were used on this CT: automated exposure   control, adjustment of the mAs and/or kVp according to patient size, and   iterative reconstruction techniques. The specific techniques used on this CT   exam have been documented in the patient's electronic medical record. Digital   Imaging and Communications in Medicine (DICOM) format image data are available   to nonaffiliated external healthcare facilities or entities on a secure, media   free, reciprocally searchable basis with patient authorization for at least a   12-month period after this study. _______________       FINDINGS:       LOWER CHEST: Lungs are clear. No pneumothorax or pleural effusion. Cardiac size   is normal. No pericardial effusion. LIVER, BILIARY: Liver is normal in unenhanced appearance. No biliary dilation. Multiple gallstones present within the gallbladder. PANCREAS: Several peripancreatic stranding noted near to the root of the   mesentery. No pancreatic ductal dilatation. Small quantity of fluid noted along   the adjacent anterior right pararenal fascia. SPLEEN: Normal.       ADRENALS: Normal.       KIDNEYS/URETERS/BLADDER: Patient's native kidneys are atrophic and multicystic,   as previously. Transplant right lower quadrant kidney redemonstrated with   intrarenal calcifications and hyperdensity, unchanged. Urinary bladder mildly   thick-walled with accompanying perivesicular stranding.        PELVIC ORGANS: Unremarkable. VASCULATURE: Diffuse aortobiiliac atherosclerotic vascular calcification is   present without evidence of aneurysmal dilatation. LYMPH NODES: Scattered subcentimeter mesenteric and retroperitoneal lymph nodes   noted without evidence of abdominal or pelvic adenopathy. GASTROINTESTINAL TRACT: Small hiatal hernia. Mild inflammatory stranding   involving the second and third portions duodenum without significant wall   thickening. No morphology of bowel obstruction. No free intraperitoneal gas. Numerous colonic diverticula without diverticulitis. Normal appendix. BONES: No acute or aggressive osseous abnormalities identified. Lower thoracic   and multilevel lumbar spondylosis with lower lumbar predominant facet joint   osteoarthritis, as previously. OTHER: None.       _______________           12/28/20 1308  CT HEAD WO CONT Final result    Impression:  IMPRESSION:           1. No acute intracranial abnormality. 2. Mild periventricular matter low-attenuation; nonspecific and favored to   reflect sequela of chronic ischemic microvascular change        Narrative:  EXAM: CT head       INDICATION: Dizziness, headaches, vertigo       COMPARISON: CT head performed June 9, 2020       TECHNIQUE: Axial CT imaging of the head was performed without intravenous   contrast. Standard multiplanar coronal and sagittal reformatted images were   obtained and are included in interpretation. One or more dose reduction techniques were used on this CT: automated exposure   control, adjustment of the mAs and/or kVp according to patient size, and   iterative reconstruction techniques. The specific techniques used on this CT   exam have been documented in the patient's electronic medical record.   Digital   Imaging and Communications in Medicine (DICOM) format image data are available   to nonaffiliated external healthcare facilities or entities on a secure, media   free, reciprocally searchable basis with patient authorization for at least a   12-month period after this study. _______________       FINDINGS:       BRAIN AND POSTERIOR FOSSA: The sulci, folia, ventricles and basal cisterns are   within normal limits for the patient?s age. There is no intracranial hemorrhage,   mass effect, or midline shift. Gray-white matter differentiation is within   normal limits. Subcortical and periventricular white matter low-attenuation, as   previously. EXTRA-AXIAL SPACES AND MENINGES: There are no abnormal extra-axial fluid   collections. CALVARIUM: Intact. SINUSES: Clear. OTHER: None.       _______________               CXR Results  (Last 48 hours)    None            Medical Decision Making   I am the first provider for this patient. I reviewed the vital signs, available nursing notes, past medical history, past surgical history, family history and social history. Vital Signs-Reviewed the patient's vital signs. Patient Vitals for the past 12 hrs:   Temp Pulse Resp SpO2   12/28/20 1243 99.2 °F (37.3 °C) 92 16 99 %     EKG interpretation (Preliminary): Performed at 3:00 PM; Read at 3:00 PM.  Rhythm: normal sinus rhythm; Rate (approx.) 78; Axis: right axis deviation; CT interval: normal; QRS interval: normal ; ST/T wave: normal; Other findings: left atrial enlargement. Records Reviewed: Nursing Notes and Old Medical Records    The patient presents with dizziness and abdominal pain with a differential diagnosis of vertigo, stroke, kidney stone, UTI and pyelonephritis. ED Course:   Initial assessment performed. The patients presenting problems have been discussed, and they are in agreement with the care plan formulated and outlined with them. I have encouraged them to ask questions as they arise throughout their visit. 3:32 PM. Pt goes to dialysis M-F, but did not go today. She is still feeling very weak and dizzy.     Provider Notes (Medical Decision Making): 1630  Patient is a 62-year-old female who presented with persistent vertigo and generalized weakness. She was being treated with Antivert per Dr. Sampson Allen. Her symptoms did not improve over the last few days. A CT of her head was negative. She has no neurological deficits. She is also complaining of generalized weakness. Her potassium today is 6.5. She was given Kayexalate, insulin, D50, and calcium gluconate. Should be admitted for dialysis tomorrow. She was supposed to have dialysis today but was unable to make it because of her symptoms. There is no EKG changes. 4:31 PM  I have spent 30 minutes of critical care time involved in lab review, consultations with specialist, family decision-making, and documentation. During this entire length of time I was immediately available to the patient. Critical Care: The reason for providing this level of medical care for this critically ill patient was due a critical illness that impaired one or more vital organ systems such that there was a high probability of imminent or life threatening deterioration in the patients condition. This care involved high complexity decision making to assess, manipulate, and support vital system functions, to treat this degreee vital organ system failure and to prevent further life threatening deterioration of the patients condition. Consultations:     Consultations:  4:20 PM  Spoke with Dr. Tomy Combs,  Specialty: nephrology  Discussed patient's history, disposition, and available diagnostic and imaging results. Reviewed care plans. Consultant agrees with plans as outlined and advises patient be admitted to hospitalist service. 4:23 PM  Spoke with Li Moe NP,  Specialty: hospitalist  Discussed patient's history, disposition, and available diagnostic and imaging results. Reviewed care plans. Consultant agrees with plans as outlined and accepts patient for admission. 5514  Dr. India Iglesias was consulted.   His nephrologist.  Kevin Dumont that his group will be happy to write dialysis orders and consult for this patient. Disposition           Diagnosis     Clinical Impression:   1. Acute hyperkalemia    2. ESRD (end stage renal disease) on dialysis ([address]    3. Vertigo        By signing my name below, I, Nii Singletaryi, attest that this documentation has been prepared under the direction and in presence of Dr. Albertina Rodrigues on 12/28/20. Electronically signed: Nii Werner, 12/28/20, 1:07 PM    Please note that this dictation was completed with [employer], the [employer] voice recognition software. Quite often unanticipated grammatical, syntax, homophones, and other interpretive errors are inadvertently transcribed by the computer software. Please disregard these errors. Please excuse any errors that have escaped final proofreading. Thank you.

## 2020-12-28 NOTE — ED NOTES
TRANSFER - OUT REPORT:    Verbal report given to Rafael RN (name) on Hank Rodrigues  being transferred to  [address] [place] [address] (unit) for routine progression of care       Report consisted of patients Situation, Background, Assessment and   Recommendations(SBAR). Information from the following report(s) ED Summary was reviewed with the receiving nurse. Lines:   Peripheral IV 12/28/20 Right Hand (Active)        Opportunity for questions and clarification was provided.       Patient transported with:   Registered Nurse

## 2020-12-28 NOTE — ED NOTES
attemtped to get bed assignment from [facility], states that NP Althea Whelan would not allow pt to come upstairs until nephrology was called and orders received.  MD made aware and spoke to Althea Whelan

## 2020-12-29 VITALS
WEIGHT: 163 LBS | HEART RATE: 73 BPM | SYSTOLIC BLOOD PRESSURE: 122 MMHG | HEIGHT: 62 IN | DIASTOLIC BLOOD PRESSURE: 55 MMHG | OXYGEN SATURATION: 98 % | TEMPERATURE: 97.7 F | RESPIRATION RATE: 18 BRPM | BODY MASS INDEX: 30 KG/M2

## 2020-12-29 LAB
ANION GAP SERPL CALC-SCNC: 12 MMOL/L
BASOPHILS # BLD: 0 K/UL (ref 0–0.1)
BASOPHILS NFR BLD: 1 % (ref 0–2)
BUN SERPL-MCNC: 21 MG/DL (ref 9–21)
BUN/CREAT SERPL: 4
CA-I BLD-MCNC: 7.9 MG/DL (ref 8.5–10.5)
CHLORIDE SERPL-SCNC: 99 MMOL/L (ref 94–111)
CO2 SERPL-SCNC: 27 MMOL/L (ref 21–33)
CREAT SERPL-MCNC: 4.8 MG/DL (ref 0.7–1.2)
EOSINOPHIL # BLD: 0.1 K/UL (ref 0–0.4)
EOSINOPHIL NFR BLD: 1 % (ref 0–5)
ERYTHROCYTE [DISTWIDTH] IN BLOOD BY AUTOMATED COUNT: 16.4 % (ref 11.6–14.5)
GLUCOSE SERPL-MCNC: 84 MG/DL (ref 70–110)
HCT VFR BLD AUTO: 32.5 % (ref 35–45)
HGB BLD-MCNC: 10.3 G/DL (ref 12–16)
IMM GRANULOCYTES # BLD AUTO: 0 K/UL
IMM GRANULOCYTES NFR BLD AUTO: 0 %
INR PPP: 1.2 (ref 0.8–1.2)
LYMPHOCYTES # BLD: 1.6 K/UL (ref 0.9–3.6)
LYMPHOCYTES NFR BLD: 27 % (ref 21–52)
MAGNESIUM SERPL-MCNC: 2.1 MG/DL (ref 1.7–2.8)
MCH RBC QN AUTO: 32.5 PG (ref 24–34)
MCHC RBC AUTO-ENTMCNC: 31.7 G/DL (ref 31–37)
MCV RBC AUTO: 102.5 FL (ref 74–97)
MONOCYTES # BLD: 0.5 K/UL (ref 0.05–1.2)
MONOCYTES NFR BLD: 8 % (ref 3–10)
NEUTS SEG # BLD: 3.7 K/UL (ref 1.8–8)
NEUTS SEG NFR BLD: 63 % (ref 40–73)
PLATELET # BLD AUTO: 261 K/UL (ref 135–420)
PMV BLD AUTO: 9 FL (ref 9.2–11.8)
POTASSIUM SERPL-SCNC: 3.1 MMOL/L (ref 3.2–5.1)
PROTHROMBIN TIME: 14.4 SEC (ref 11.5–15.2)
RBC # BLD AUTO: 3.17 M/UL (ref 4.2–5.3)
SODIUM SERPL-SCNC: 138 MMOL/L (ref 135–145)
WBC # BLD AUTO: 5.8 K/UL (ref 4.6–13.2)

## 2020-12-29 PROCEDURE — 74011250637 HC RX REV CODE- 250/637: Performed by: INTERNAL MEDICINE

## 2020-12-29 PROCEDURE — 85610 PROTHROMBIN TIME: CPT

## 2020-12-29 PROCEDURE — 74011250637 HC RX REV CODE- 250/637: Performed by: NURSE PRACTITIONER

## 2020-12-29 PROCEDURE — 94760 N-INVAS EAR/PLS OXIMETRY 1: CPT

## 2020-12-29 PROCEDURE — 74011250636 HC RX REV CODE- 250/636: Performed by: NURSE PRACTITIONER

## 2020-12-29 PROCEDURE — 80048 BASIC METABOLIC PNL TOTAL CA: CPT

## 2020-12-29 PROCEDURE — 83735 ASSAY OF MAGNESIUM: CPT

## 2020-12-29 PROCEDURE — 94640 AIRWAY INHALATION TREATMENT: CPT

## 2020-12-29 PROCEDURE — 36415 COLL VENOUS BLD VENIPUNCTURE: CPT

## 2020-12-29 PROCEDURE — 87340 HEPATITIS B SURFACE AG IA: CPT

## 2020-12-29 PROCEDURE — 85025 COMPLETE CBC W/AUTO DIFF WBC: CPT

## 2020-12-29 RX ORDER — ATORVASTATIN CALCIUM 10 MG/1
10 TABLET, FILM COATED ORAL DAILY
Status: DISCONTINUED | OUTPATIENT
Start: 2020-12-29 | End: 2020-12-29 | Stop reason: HOSPADM

## 2020-12-29 RX ORDER — WARFARIN 2 MG/1
6 TABLET ORAL DAILY
Qty: 30 TAB | Refills: 0 | Status: SHIPPED | OUTPATIENT
Start: 2020-12-29 | End: 2021-06-20

## 2020-12-29 RX ORDER — TRAMADOL HYDROCHLORIDE 50 MG/1
50 TABLET ORAL
Status: DISCONTINUED | OUTPATIENT
Start: 2020-12-29 | End: 2020-12-29 | Stop reason: HOSPADM

## 2020-12-29 RX ORDER — HYDROCODONE BITARTRATE AND ACETAMINOPHEN 5; 325 MG/1; MG/1
1 TABLET ORAL
Status: DISCONTINUED | OUTPATIENT
Start: 2020-12-29 | End: 2020-12-29 | Stop reason: HOSPADM

## 2020-12-29 RX ORDER — POTASSIUM CHLORIDE 750 MG/1
40 TABLET, EXTENDED RELEASE ORAL
Status: COMPLETED | OUTPATIENT
Start: 2020-12-29 | End: 2020-12-29

## 2020-12-29 RX ADMIN — DICYCLOMINE HYDROCHLORIDE 10 MG: 10 CAPSULE ORAL at 00:33

## 2020-12-29 RX ADMIN — CEPHALEXIN 500 MG: 250 CAPSULE ORAL at 08:50

## 2020-12-29 RX ADMIN — AMIODARONE HYDROCHLORIDE 200 MG: 200 TABLET ORAL at 08:51

## 2020-12-29 RX ADMIN — MONTELUKAST 10 MG: 10 TABLET, FILM COATED ORAL at 08:51

## 2020-12-29 RX ADMIN — POTASSIUM CHLORIDE 40 MEQ: 750 TABLET, EXTENDED RELEASE ORAL at 12:44

## 2020-12-29 RX ADMIN — HYDROCODONE BITARTRATE AND ACETAMINOPHEN 1 TABLET: 5; 325 TABLET ORAL at 00:43

## 2020-12-29 RX ADMIN — AMLODIPINE BESYLATE 10 MG: 5 TABLET ORAL at 08:50

## 2020-12-29 RX ADMIN — WARFARIN SODIUM 4 MG: 2 TABLET ORAL at 08:50

## 2020-12-29 RX ADMIN — MECLIZINE 25 MG: 12.5 TABLET ORAL at 12:44

## 2020-12-29 RX ADMIN — DICYCLOMINE HYDROCHLORIDE 10 MG: 10 CAPSULE ORAL at 12:44

## 2020-12-29 RX ADMIN — CEPHALEXIN 500 MG: 250 CAPSULE ORAL at 00:33

## 2020-12-29 RX ADMIN — ATORVASTATIN CALCIUM 10 MG: 10 TABLET, FILM COATED ORAL at 08:50

## 2020-12-29 RX ADMIN — DICYCLOMINE HYDROCHLORIDE 10 MG: 10 CAPSULE ORAL at 08:51

## 2020-12-29 RX ADMIN — FLUTICASONE PROPIONATE 1 SPRAY: 50 SPRAY, METERED NASAL at 09:52

## 2020-12-29 RX ADMIN — ACETAMINOPHEN 650 MG: 325 TABLET ORAL at 08:50

## 2020-12-29 RX ADMIN — BUDESONIDE AND FORMOTEROL FUMARATE DIHYDRATE 2 PUFF: 160; 4.5 AEROSOL RESPIRATORY (INHALATION) at 07:40

## 2020-12-29 RX ADMIN — METOPROLOL TARTRATE 50 MG: 50 TABLET, FILM COATED ORAL at 08:51

## 2020-12-29 RX ADMIN — TRAMADOL HYDROCHLORIDE 50 MG: 50 TABLET, FILM COATED ORAL at 04:02

## 2020-12-29 NOTE — CONSULTS
Patient is followed by Dr. Stacy Ovalle outpatient.   Per nursing staff he has been informed  Will defer the consult to him

## 2020-12-29 NOTE — PROGRESS NOTES
Patient is not available to be assessed at this time.       [address] [facility].   [phone]

## 2020-12-29 NOTE — DIALYSIS
[facility]  [phone]    Vitals   Pre   Post   Assessment   Pre   Post     Temp  Temp: 97.4 °F (36.3 °C) (12/28/20 2100)  97.4 LOC  Alert and oriented x4 Alert and oriented x4   HR   Pulse (Heart Rate): (!) 103 (12/28/20 2200) 93   Lungs   Diminished on room air  diminished on room air   B/P   BP: (!) 152/85 (12/28/20 2200) 133/75 Cardiac   B/p wnl  b/p wnl   Resp   Resp Rate: 22 (12/28/20 2200) 20 Skin   intact  intact   Pain level  0 0 Edema  None noted     None noted   Orders:    Duration:   Start:    2103 End:    0003 Total:   3hrs   Dialyzer:   Dialyzer/Set Up Inspection: Keith Ash (12/28/20 2100)   K Bath:   Dialysate K (mEq/L): 1 (12/28/20 2100)   Ca Bath:   Dialysate CA (mEq/L): 2.5 (12/28/20 2100)   Na/Bicarb:   Dialysate NA (mEq/L): 140 (12/28/20 2100)   Target Fluid Removal:   Goal/Amount of Fluid to Remove (mL): 1000 mL (12/28/20 2100)   Access     Type & Location:   Lt arm fistula, good thrill and bruit. Area cleaned and accessed with 15g needles x2 without any issues. Good blood flow noted.  No signs of infection noted   Labs     Obtained/Reviewed   Critical Results Called   Date when labs were drawn-  Hgb-    HGB   Date Value Ref Range Status   12/28/2020 11.7 (L) 12.0 - 16.0 g/dL Final     K-    Potassium   Date Value Ref Range Status   12/28/2020 6.5 (HH) 3.2 - 5.1 mmol/L Final     Comment:     CALLED TO AND READ BACK BY EVANS IN ER AT 1525 PCO     Ca-   Calcium   Date Value Ref Range Status   12/28/2020 7.8 (L) 8.5 - 10.5 mg/dL Final     Bun-   BUN   Date Value Ref Range Status   12/28/2020 73 (H) 9 - 21 mg/dL Final     Creat-   Creatinine   Date Value Ref Range Status   12/28/2020 11.30 (H) 0.70 - 1.20 mg/dL Final        Medications/ Blood Products Given     Name   Dose   Route and Time        none             Blood Volume Processed (BVP):    75 Net Fluid   Removed:  500ml   Comments   Time Out Done: 2100  Primary Nurse Ana Dillard RN  Primary Nurse [address] RN  Pt Education:ESRD  Care Plan:ESRD  Tx Summary:  9131 Orders received for dialysis from Dr Otf Mayers  2103 Dialysis started. Pt states that clinis normally does not pull any fluid. Pt still voids large amounts of urine and only goes to dialysis on Monday and fridays. 2245 k bath changed to 2k at this time. 2325 Uf turned off and 100ml given for cramping  0003 Dialysis completed and blood rinsed back. Needles removed and pressure held till bleeding stopped. Dressing applied to each site,pt stable  Admiting Diagnosis:Vertigo, ESRD  Pt's previous clinic-[facility]  Consent signed - Informed Consent Verified: Yes (12/28/20 2100)  Hepatitis Status- Unknown, Hep b AG pending  Machine #- Machine Number: [id number] (12/28/20 2100)  Telemetry status-  Pre-dialysis wt. -

## 2020-12-29 NOTE — PROGRESS NOTES
VS obtained and stable. Patient still complaining of headache and abdominal pain but it has improved after Norco and Tramadol. No other needs voiced. CBWR.

## 2020-12-29 NOTE — PROGRESS NOTES
Patient will be going back home at discharge. She uses a rollator and a wheeled walker at home. No concerns voiced.

## 2020-12-29 NOTE — PROGRESS NOTES
Assumed care of patient. Vs obtained and stable. Assessment completed. Admission completed. No needs voiced. CBWR.

## 2020-12-29 NOTE — DISCHARGE INSTRUCTIONS
Patient Education        Asthma Attack in Children: Care Instructions  Your Care Instructions     During an asthma attack, the airways swell and narrow. This makes it hard for your child to breathe. Severe asthma attacks can be life-threatening. But you can help prevent them by keeping your child's asthma under control and treating symptoms before they get bad. Symptoms include being short of breath, having chest tightness, coughing, and wheezing. Noting and treating these symptoms can also help you avoid future trips to the emergency room. The doctor has checked your child carefully, but problems can develop later. If you notice any problems or new symptoms, get medical treatment right away. Follow-up care is a key part of your child's treatment and safety. Be sure to make and go to all appointments, and call your doctor if your child is having problems. It's also a good idea to know your child's test results and keep a list of the medicines your child takes. How can you care for your child at home? Follow an action plan  · Make and follow an asthma action plan. It lists the medicines your child takes every day and will show you what to do if your child has an attack. · Work with a doctor to make a plan if your child doesn't have one. Make treatment part of daily life. · Tell teachers and coaches that your child has asthma. Give them a copy of your child's asthma action plan. Take medications correctly  · Your child should take asthma medicines as directed. Talk to your child's doctor right away if you have any questions about how your child should take them. Most children with asthma need two types of medicine. ? Your child may take daily controller medicine to control asthma. This is usually an inhaled steroid. Don't use the daily medicine to treat an attack that has already started. It doesn't work fast enough. ? Your child will use a quick-relief medicine when he or she has symptoms of an attack.  This is usually an albuterol inhaler. ? Make sure that your child has quick-relief medicine with him or her at all times. ? If your doctor prescribed steroid pills for your child to use during an attack, give them exactly as prescribed. It may take hours for the pills to work. But they may make the episode shorter and help your child breathe better. Check your child's breathing  · If your child has a peak flow meter, use it to check how well your child is breathing. This can help you predict when an asthma attack is going to occur. Then your child can take medicine to prevent the asthma attack or make it less severe. Most children age 11 and older can learn how to use this meter. Avoid asthma triggers  · Keep your child away from smoke. Do not smoke or let anyone else smoke around your child or in your house. · Try to learn what triggers your child's asthma attacks. Then avoid the triggers when you can. Common triggers include colds, smoke, air pollution, pollen, mold, pets, cockroaches, stress, and cold air. · Make sure your child is up to date on immunizations and gets a yearly flu vaccine. When should you call for help? [id number] anytime you think your child may need emergency care. For example, call if:  · Your child has severe trouble breathing. Call your doctor now or seek immediate medical care if:  · Your child's symptoms do not get better after you've followed his or her asthma action plan. · Your child has new or worse trouble breathing. · Your child's coughing or wheezing gets worse. · Your child coughs up dark brown or bloody mucus (sputum). · Your child has a new or higher fever. Watch closely for changes in your child's health, and be sure to contact your doctor if:  · Your child needs quick-relief medicine on more than 2 days a week (unless it is just for exercise). · Your child coughs more deeply or more often, especially if you notice more mucus or a change in the color of the mucus.   · Your child is not getting better as expected. Where can you learn more? Go to http://www.Confabb.com/  Enter C453 in the search box to learn more about \"Asthma Attack in Children: Care Instructions. \"  Current as of: February 24, 2020               Content Version: 12.5  © 6249-2371 [employer]. Care instructions adapted under license by [employer] (which disclaims liability or warranty for this information). If you have questions about a medical condition or this instruction, always ask your healthcare professional. [address] any warranty or liability for your use of this information. Patient Education        Blood Urea Nitrogen (BUN) Test: About This Test  What is it? A blood urea nitrogen (BUN) test measures the amount of nitrogen in your blood that comes from the waste product urea. Urea is made in the liver and passed out of your body in the urine. If your kidneys are not able to remove urea from the blood normally, your BUN level rises. Dehydration can also make your BUN level higher. A BUN test may be done with a blood creatinine test. The level of creatinine in your blood also tells how well your kidneys are working. A high creatinine level may mean your kidneys are not working properly. BUN and creatinine tests can be used together to find the BUN-to-creatinine ratio. Why is this test done? A BUN test is done to:  · See if your kidneys are working normally. · See if your kidney disease is getting worse. · See if treatment of your kidney disease is working. · Check for severe dehydration. Dehydration generally causes BUN levels to rise more than creatinine levels. This causes a high BUN-to-creatinine ratio. Kidney disease or blockage of the flow of urine from your kidney causes both BUN and creatinine levels to go up.   How do you prepare for the test?  Do not eat a lot of meat or other protein in the 24 hours before having a BUN test.  How is the test done? A health professional uses a needle to take a blood sample, usually from the arm. What happens after the test?  · You will probably be able to go home right away. · You can go back to your usual activities right away. Follow-up care is a key part of your treatment and safety. Be sure to make and go to all appointments, and call your doctor if you are having problems. It's also a good idea to keep a list of the medicines you take. Ask your doctor when you can expect to have your test results. Where can you learn more? Go to http://www.gray.com/  Enter Z830 in the search box to learn more about \"Blood Urea Nitrogen (BUN) Test: About This Test.\"  Current as of: April 15, 2020               Content Version: 12.6  © 8449-4057 [employer]. Care instructions adapted under license by [employer] (which disclaims liability or warranty for this information). If you have questions about a medical condition or this instruction, always ask your healthcare professional. [place] [address] any warranty or liability for your use of this information. Patient Education        Learning About COPD, Asthma, and Air Pollution  How does air pollution affect COPD and asthma? When you have COPD or asthma, air pollution may make your symptoms worse. If it does, it means that air pollution is a trigger for you. It is important to know what your triggers are and how to deal with them. If air pollution is a trigger for you, you need to learn about air quality and pay attention to weather forecasts that include how bad the air is expected to be. How can you manage a flare-up caused by air pollution? · Do not panic. Quick treatment at home may help you prevent serious breathing problems. · Take your medicines exactly as your doctor tells you.  ? Use your quick-relief inhaler as directed by your doctor.  If your symptoms do not get better after you use your medicine, have someone take you to the emergency room. Call an ambulance if necessary. ? With inhaled medicines, a spacer or a nebulizer may help you get more medicine to your lungs. Ask your doctor or pharmacist how to use them properly. Practice using the spacer in front of a mirror before you have a flare-up. This may help you get the medicine into your lungs quickly. ? If your doctor has given you steroid pills, take them as directed. ? Talk to your doctor if you have any problems with your medicine. What can you do to prevent flare-ups? · Try not to be outside when air pollution levels are high. Stay at home with your windows closed. · Do not smoke. This is the most important step you can take to prevent more damage to your lungs and prevent problems. If you already smoke, it is never too late to stop. If you need help quitting, talk to your doctor about stop-smoking programs and medicines. These can increase your chances of quitting for good. · Avoid secondhand smoke; cold, dry air; and high altitudes. · Take your daily medicines as prescribed. · Avoid colds and flu. ? Get a pneumococcal vaccine. ? Get a flu vaccine each year, as soon as it is available. Ask those you live or work with to do the same, so they will not get the flu and infect you. ? Try to stay away from people with colds or the flu. ? Wash your hands often. Follow-up care is a key part of your treatment and safety. Be sure to make and go to all appointments, and call your doctor if you are having problems. It's also a good idea to know your test results and keep a list of the medicines you take. Where can you learn more? Go to http://www.gray.com/  Enter B312 in the search box to learn more about \"Learning About COPD, Asthma, and Air Pollution. \"  Current as of: February 24, 2020               Content Version: 12.6  © 7424-2911 [employer], [facility].    Care instructions adapted under license by [employer] (which disclaims liability or warranty for this information). If you have questions about a medical condition or this instruction, always ask your healthcare professional. [address] any warranty or liability for your use of this information. Patient Education        Breathing Techniques for COPD: Care Instructions  Your Care Instructions     Breathing is hard when you have chronic obstructive pulmonary disease (COPD). You may take quick, short breaths. Breathing this way makes it harder to get air into your lungs. Learning new ways to control your breathing may help. You may feel better and be able to do more. You can try three basic ways to control your breathing. They are pursed-lip breathing, diaphragmatic breathing, and breathing while bending. Use these methods when you are more short of breath than normal. Practice them often so you can do them well. Follow-up care is a key part of your treatment and safety. Be sure to make and go to all appointments, and call your doctor if you are having problems. It's also a good idea to know your test results and keep a list of the medicines you take. How can you care for yourself at home? · Pursed-lip breathing helps you breathe more air out so that your next breath can be deeper. For this type of breathing, you breathe in through your nose and out through your mouth while almost closing your lips. Breathe in for about 2 seconds, and breathe out for 4 to 6 seconds. Pursed-lip breathing decreases shortness of breath and improves your ability to exercise. · Diaphragmatic breathing helps your lungs expand so that they take in more air. ? Lie on your back, or prop yourself up on several pillows. ? Put one hand on your belly and the other on your chest. When you breathe in, push your belly out as far as possible.  You should feel the hand on your belly move out, while the hand on your chest does not move. ? When you breathe out, you should feel the hand on your belly move in. When you can do this type of breathing well while lying down, learn to do it while sitting or standing. Many people with COPD find this breathing method helpful. ? Practice diaphragmatic breathing for 20 minutes, 2 or 3 times a day. · Breathing while bending forward at the waist may make breathing easier. It can reduce shortness of breath while you exercise or rest. It helps the diaphragm move more easily. The diaphragm is a large muscle that separates your lungs from your belly. It helps draw air into your lungs as you breathe. When should you call for help? Call your doctor now or seek immediate medical care if:    · Your breathing methods do not help.     · Your shortness of breath gets worse.     · You cough up blood.     · You have swelling in your belly and legs.     · You have severe chest pain. Watch closely for changes in your health, and be sure to contact your doctor if you have any problems. Where can you learn more? Go to http://www.gray.com/  Enter [id number] in the search box to learn more about \"Breathing Techniques for COPD: Care Instructions. \"  Current as of: February 24, 2020               Content Version: 12.6  © 2006-2020 [employer]. Care instructions adapted under license by [employer] (which disclaims liability or warranty for this information). If you have questions about a medical condition or this instruction, always ask your healthcare professional. [place] [address] any warranty or liability for your use of this information. Patient Education        Chronic Obstructive Pulmonary Disease (COPD) Flare-Ups: Care Instructions  Your Care Instructions     Chronic obstructive pulmonary disease (COPD) is a lung disease that makes it hard to breathe.  It is caused by damage to the lungs over many years, usually from smoking. COPD is often a mix of two diseases:  · Chronic bronchitis: The airways that carry air to the lungs (bronchial tubes) get inflamed and make a lot of mucus. This can narrow or block the airways. · Emphysema: In a healthy person, the tiny air sacs in the lungs are like balloons. As you breathe in and out, they get bigger and smaller to move air through your lungs. But with emphysema, these air sacs are damaged and lose their stretch. Less air gets in and out of the lungs. Many people with COPD have attacks called flare-ups or exacerbations. This is when your usual symptoms quickly get worse and stay worse. The doctor has checked you carefully. But problems can develop later. If you notice any problems or new symptoms, get medical treatment right away. Follow-up care is a key part of your treatment and safety. Be sure to make and go to all appointments, and call your doctor if you are having problems. It's also a good idea to know your test results and keep a list of the medicines you take. How can you care for yourself at home? · Be safe with medicines. Take your medicines exactly as prescribed. Call your doctor if you think you are having a problem with your medicine. You may be taking medicines such as:  ? Bronchodilators. These help open your airways and make breathing easier. ? Corticosteroids. These reduce airway inflammation. They may be given as pills, in a vein, or in an inhaled form. You may go home with pills in addition to an inhaler that you already use. · A spacer may help you get more inhaled medicine to your lungs. Ask your doctor or pharmacist if a spacer is right for you. If it is, ask how to use it properly. · If your doctor prescribed antibiotics, take them as directed. Do not stop taking them just because you feel better. You need to take the full course of antibiotics. · If your doctor prescribed oxygen, use the flow rate your doctor has recommended.  Do not change it without talking to your doctor first.  · Do not smoke. Smoking makes COPD worse. If you need help quitting, talk to your doctor about stop-smoking programs and medicines. These can increase your chances of quitting for good. When should you call for help? Call 911 anytime you think you may need emergency care. For example, call if:    · You have severe trouble breathing. Call your doctor now or seek immediate medical care if:    · You have new or worse trouble breathing.     · Your coughing or wheezing gets worse.     · You cough up dark brown or bloody mucus (sputum).     · You have a new or higher fever. Watch closely for changes in your health, and be sure to contact your doctor if:    · You notice more mucus or a change in the color of your mucus.     · You need to use your antibiotic or steroid pills.     · You do not get better as expected. Where can you learn more? Go to http://www.gray.com/  Enter D989 in the search box to learn more about \"Chronic Obstructive Pulmonary Disease (COPD) Flare-Ups: Care Instructions. \"  Current as of: February 24, 2020               Content Version: 12.6  © 4661-7028 [employer]. Care instructions adapted under license by [employer] (which disclaims liability or warranty for this information). If you have questions about a medical condition or this instruction, always ask your healthcare professional. [address] any warranty or liability for your use of this information. Patient Education        Dizziness: Care Instructions  Your Care Instructions  Dizziness is the feeling of unsteadiness or fuzziness in your head. It is different than having vertigo, which is a feeling that the room is spinning or that you are moving or falling. It is also different from lightheadedness, which is the feeling that you are about to faint. It can be hard to know what causes dizziness.  Some people feel dizzy when they have migraine headaches. Sometimes bouts of flu can make you feel dizzy. Some medical conditions, such as heart problems or high blood pressure, can make you feel dizzy. Many medicines can cause dizziness, including medicines for high blood pressure, pain, or anxiety. If a medicine causes your symptoms, your doctor may recommend that you stop or change the medicine. If it is a problem with your heart, you may need medicine to help your heart work better. If there is no clear reason for your symptoms, your doctor may suggest watching and waiting for a while to see if the dizziness goes away on its own. Follow-up care is a key part of your treatment and safety. Be sure to make and go to all appointments, and call your doctor if you are having problems. It's also a good idea to know your test results and keep a list of the medicines you take. How can you care for yourself at home? · If your doctor recommends or prescribes medicine, take it exactly as directed. Call your doctor if you think you are having a problem with your medicine. · Do not drive while you feel dizzy. · Try to prevent falls. Steps you can take include:  ? Using nonskid mats, adding grab bars near the tub, and using night-lights. ? Clearing your home so that walkways are free of anything you might trip on.  ? Letting family and friends know that you have been feeling dizzy. This will help them know how to help you. When should you call for help? Call 911 anytime you think you may need emergency care. For example, call if:    · You passed out (lost consciousness).     · You have dizziness along with symptoms of a heart attack. These may include:  ? Chest pain or pressure, or a strange feeling in the chest.  ? Sweating. ? Shortness of breath. ? Nausea or vomiting. ? Pain, pressure, or a strange feeling in the back, neck, jaw, or upper belly or in one or both shoulders or arms. ? Lightheadedness or sudden weakness.   ? A fast or irregular heartbeat.     · You have symptoms of a stroke. These may include:  ? Sudden numbness, tingling, weakness, or loss of movement in your face, arm, or leg, especially on only one side of your body. ? Sudden vision changes. ? Sudden trouble speaking. ? Sudden confusion or trouble understanding simple statements. ? Sudden problems with walking or balance. ? A sudden, severe headache that is different from past headaches. Call your doctor now or seek immediate medical care if:    · You feel dizzy and have a fever, headache, or ringing in your ears.     · You have new or increased nausea and vomiting.     · Your dizziness does not go away or comes back. Watch closely for changes in your health, and be sure to contact your doctor if:    · You do not get better as expected. Where can you learn more? Go to http://www.gray.com/  Enter Q823 in the search box to learn more about \"Dizziness: Care Instructions. \"  Current as of: June 26, 2019               Content Version: 12.6  © 8456-6589 [employer]. Care instructions adapted under license by [employer] (which disclaims liability or warranty for this information). If you have questions about a medical condition or this instruction, always ask your healthcare professional. [place] [address] any warranty or liability for your use of this information. Patient Education        Learning About Hemodialysis and Vascular Access Surgery  What are hemodialysis and vascular access? Before you can start dialysis, your doctor will need to create a vascular access. This is a place where the blood can flow in and out of your body during your dialysis sessions. Your doctor will prepare the vascular access weeks to months before dialysis starts. It's important to get your access as soon as your doctor says to. This allows your access to heal before you use it.   For dialysis to work best, the access needs to have a good, steady blood flow. It also must be sturdy since it will be used often, usually 3 times every week. Hemodialysis is a way to remove wastes from the blood when your kidneys can no longer do the job. It's a lifesaving treatment when you have kidney failure. Hemodialysis is often called dialysis. Learning about vascular access and dialysis can help you take an active role in your treatment. Dialysis doesn't cure kidney disease. But it can help you live longer and feel better. You will need to follow your diet and treatment schedule carefully. How is vascular access surgery done? The vascular access is where the needles are put that draw the blood from your body and send it through tubes to the dialysis machine. This access is also used to return the clean blood that is sent back into your body. There are two basic types of permanent vascular access:  AV fistula. To make a fistula, your doctor will connect an artery to a vein in your arm. After the fistula heals, the dialysis needles can be put into it. Fistulas tend to be stronger and less likely to get infected than grafts. But they need to be prepared at least several months ahead of time. They are the best type of vascular access, but they are harder to create. AV graft. To make a graft, your doctor will put a tube under the skin in your arm. The tube, or graft, connects an artery and a vein. The dialysis needles can then be put into the graft for dialysis. A graft is a good choice if you have small veins or other problems. A graft can sometimes be used as soon as 1 to 3 weeks after placement. You will be asleep or get medicine to feel relaxed during the surgery. You will not feel pain. Your doctor will make a cut (incision) on the arm you use the least. If you are right-handed, the fistula or graft will probably be put in your left arm. If you are left-handed, it will probably be put in your right arm.  Your doctor will close the incision with stitches. The incision will leave a scar that fades with time. If you need to start hemodialysis right away, your doctor may place a tube in a vein in your neck, chest, or leg. This is called a central venous catheter. The catheter can be used while your permanent access heals. Catheters have more problems than an AV fistula or AV graft, so they aren't the best choice for permanent access. If you get an AV fistula, you will probably go home the same day as the surgery. If you get an AV graft, you may spend 1 night at the hospital. You will probably need to take 1 or 2 days off from work. What happens during hemodialysis? Hemodialysis uses a man-made membrane called a dialyzer to clean your blood. You are connected to the dialyzer by tubes. They are attached to your blood vessels through your vascular access. · Blood flows from your body into the dialysis machine through one of the tubes. · In the machine, your blood is filtered. When your blood is in the filter, dialysate solution also goes through the filter. This solution takes waste out of your blood. · The used solution is pumped out of the machine. Your clean blood returns to your body through the other tube. How can you care for yourself at home? · Be sure to have all of your dialysis sessions. Do not try to shorten or skip your sessions. You have a better chance of a longer and healthier life by getting your full treatment. · Your doctor or health care team will show you the steps you need to go through each day before, during, and after dialysis. Be sure to follow these steps. If you do not understand a step, talk to your team.  · Your doctor and dietitian will help you design menus that follow your diet. Be sure to follow your diet guidelines. ? You will need to limit fluids and certain foods that contain salt (sodium), potassium, and phosphorus.   ? You may need to follow a heart-healthy diet to keep the fat (cholesterol) in your blood under control. ? You may need higher levels of protein in your diet. · Your doctor may recommend certain vitamins. But do not take any other medicine, including over-the-counter medicines, vitamins, and herbal products, without talking to your doctor first.  · Do not smoke. Smoking raises your risk of many health problems, including more kidney damage. If you need help quitting, talk to your doctor about stop-smoking programs and medicines. These can increase your chances of quitting for good. · Do not take ibuprofen (Advil, Motrin), naproxen (Aleve), or similar medicines, unless your doctor tells you to. These medicines may make kidney problems worse. Follow-up care is a key part of your treatment and safety. Be sure to make and go to all appointments, and call your doctor if you are having problems. It's also a good idea to know your test results and keep a list of the medicines you take. Where can you learn more? Go to http://www.gray.com/  Enter Y710 in the search box to learn more about \"Learning About Hemodialysis and Vascular Access Surgery. \"  Current as of: April 15, 2020               Content Version: 12.6  © 7345-6904 [employer]. Care instructions adapted under license by [employer] (which disclaims liability or warranty for this information). If you have questions about a medical condition or this instruction, always ask your healthcare professional. [address] any warranty or liability for your use of this information. Patient Education        Lightheadedness or Faintness: Care Instructions  Your Care Instructions  Lightheadedness is a feeling that you are about to faint or \"pass out. \" You do not feel as if you or your surroundings are moving. It is different from vertigo, which is the feeling that you or things around you are spinning or tilting.   Lightheadedness usually goes away or gets better when you lie down. If lightheadedness gets worse, it can lead to a fainting spell. It is common to feel lightheaded from time to time. Lightheadedness usually is not caused by a serious problem. It often is caused by a short-lasting drop in blood pressure and blood flow to your head that occurs when you get up too quickly from a seated or lying position. Follow-up care is a key part of your treatment and safety. Be sure to make and go to all appointments, and call your doctor if you are having problems. It's also a good idea to know your test results and keep a list of the medicines you take. How can you care for yourself at home? · Lie down for 1 or 2 minutes when you feel lightheaded. After lying down, sit up slowly and remain sitting for 1 to 2 minutes before slowly standing up. · Avoid movements, positions, or activities that have made you lightheaded in the past.  · Get plenty of rest, especially if you have a cold or flu, which can cause lightheadedness. · Make sure you drink plenty of fluids, especially if you have a fever or have been sweating. · Do not drive or put yourself and others in danger while you feel lightheaded. When should you call for help? Call 911 anytime you think you may need emergency care. For example, call if:    · You have symptoms of a stroke. These may include:  ? Sudden numbness, tingling, weakness, or loss of movement in your face, arm, or leg, especially on only one side of your body. ? Sudden vision changes. ? Sudden trouble speaking. ? Sudden confusion or trouble understanding simple statements. ? Sudden problems with walking or balance. ? A sudden, severe headache that is different from past headaches.     · You have symptoms of a heart attack. These may include:  ? Chest pain or pressure, or a strange feeling in the chest.  ? Sweating. ? Shortness of breath. ? Nausea or vomiting.   ? Pain, pressure, or a strange feeling in the back, neck, jaw, or upper belly or in one or both shoulders or arms. ? Lightheadedness or sudden weakness. ? A fast or irregular heartbeat. After you call 911, the  may tell you to chew 1 adult-strength or 2 to 4 low-dose aspirin. Wait for an ambulance. Do not try to drive yourself. Watch closely for changes in your health, and be sure to contact your doctor if:    · Your lightheadedness gets worse or does not get better with home care. Where can you learn more? Go to http://www.gray.com/  Enter [id number] in the search box to learn more about \"Lightheadedness or Faintness: Care Instructions. \"  Current as of: June 26, 2019               Content Version: 12.6  © 1232-9295 [employer]. Care instructions adapted under license by [employer] (which disclaims liability or warranty for this information). If you have questions about a medical condition or this instruction, always ask your healthcare professional. [place] [address] any warranty or liability for your use of this information. Patient Education        Learning About Chronic Kidney Disease  What is chronic kidney disease? Chronic kidney disease means your kidneys have not worked right for a while. Your kidneys have an important job. They remove waste and extra fluid from your blood. This waste and fluid goes out of your body in your urine. When your kidneys don't work as they should, wastes build up in your blood. This makes you sick. High blood pressure and diabetes can cause kidney damage. Other causes include kidney infections and some medicines. Chronic kidney disease is also called chronic renal failure. Or it may be called chronic renal insufficiency. How is chronic kidney disease diagnosed? · Your doctor will do blood and urine tests to check your kidney function. This will help your doctor see how well your kidneys filter your blood. · Your doctor will ask you about past kidney problems.  He or she will ask if you have a family history of kidney disease. Your doctor will also want to know what medicines you take. This includes prescription and over-the-counter medicines. · You may have a test, such as an ultrasound or CT scan. These tests let your doctor look at a picture of your kidneys. This can help your doctor measure the size of your kidneys and see if anything is blocking your urine flow. · In some cases, your doctor may take a tiny sample of kidney tissue. This is called a biopsy. It helps the doctor find out what caused the kidney disease. What are the symptoms? You may not have symptoms if your disease is mild. If you lose more kidney function, you may:  · Have swelling and weight gain. This is from the extra fluid in your tissues. It is called edema (say \"ih-GILMER-muh\"). · Often feel sick to your stomach (nauseated) or vomit. · Have trouble sleeping. · Urinate less than normal.  · Have trouble thinking clearly. · Feel very tired. What can you expect when you have chronic kidney disease? · In the early stages of the disease, your kidneys are still able to regulate the fluids, salts, and waste products in your body. But if you keep losing kidney function, you may start to have problems, or complications. · How long it takes for the kidney disease to get worse depends on your condition. Sometimes it gets worse very slowly over many years. Or it may get worse more quickly. · When kidney function falls below a certain point, it is called kidney failure. Kidney failure affects your whole body. It can cause serious heart, bone, and brain problems and make you feel very ill. Untreated kidney failure can cause death. How is it treated? Manage your health problems  · If you have diabetes, it's important to control your blood sugar level with diet, exercise, and medicines. If your blood sugar level is too high for too long, it can damage your kidneys.   · If you have high blood pressure, it's important to control it with diet, exercise, and any medicines your doctor prescribes. · Avoid long-term use of medicines that can damage the kidneys. These medicines include nonsteroidal anti-inflammatory drugs. Examples of these are ibuprofen (Advil) and celecoxib (Celebrex). Treat kidney complications  · If your doctor put you on a special diet, stay on the diet. · If you have kidney failure, you'll probably have two treatment choices. Your doctor may recommend that you start kidney dialysis to filter wastes and extra fluid from your blood. Or it may be better to get a new kidney (transplant). Both treatments have risks and benefits. Talk with your doctor to decide which is best for you. Practice healthy habits  · If your doctor recommends it, get more exercise. Walking is a good choice. Bit by bit, increase the amount you walk every day. Try for at least 30 minutes on most days of the week. · Don't smoke. Smoking can make chronic kidney disease worse. If you need help quitting, talk to your doctor about stop-smoking programs and medicines. These can increase your chances of quitting for good. · Don't drink alcohol. Follow-up care is a key part of your treatment and safety. Be sure to make and go to all appointments, and call your doctor if you are having problems. It's also a good idea to know your test results and keep a list of the medicines you take. Where can you learn more? Go to http://www.gray.com/  Enter V124 in the search box to learn more about \"Learning About Chronic Kidney Disease. \"  Current as of: April 15, 2020               Content Version: 12.6  © 2707-5847 [employer]. Care instructions adapted under license by [employer] (which disclaims liability or warranty for this information).  If you have questions about a medical condition or this instruction, always ask your healthcare professional. [address] any warranty or liability for your use of this information. Patient Education        Urinary Tract Infection in Women: Care Instructions  Your Care Instructions     A urinary tract infection, or UTI, is a general term for an infection anywhere between the kidneys and the urethra (where urine comes out). Most UTIs are bladder infections. They often cause pain or burning when you urinate. UTIs are caused by bacteria and can be cured with antibiotics. Be sure to complete your treatment so that the infection goes away. Follow-up care is a key part of your treatment and safety. Be sure to make and go to all appointments, and call your doctor if you are having problems. It's also a good idea to know your test results and keep a list of the medicines you take. How can you care for yourself at home? · Take your antibiotics as directed. Do not stop taking them just because you feel better. You need to take the full course of antibiotics. · Drink extra water and other fluids for the next day or two. This may help wash out the bacteria that are causing the infection. (If you have kidney, heart, or liver disease and have to limit fluids, talk with your doctor before you increase your fluid intake.)  · Avoid drinks that are carbonated or have caffeine. They can irritate the bladder. · Urinate often. Try to empty your bladder each time. · To relieve pain, take a hot bath or lay a heating pad set on low over your lower belly or genital area. Never go to sleep with a heating pad in place. To prevent UTIs  · Drink plenty of water each day. This helps you urinate often, which clears bacteria from your system. (If you have kidney, heart, or liver disease and have to limit fluids, talk with your doctor before you increase your fluid intake.)  · Urinate when you need to. · Urinate right after you have sex. · Change sanitary pads often.   · Avoid douches, bubble baths, feminine hygiene sprays, and other feminine hygiene products that have deodorants. · After going to the bathroom, wipe from front to back. When should you call for help? Call your doctor now or seek immediate medical care if:    · Symptoms such as fever, chills, nausea, or vomiting get worse or appear for the first time.     · You have new pain in your back just below your rib cage. This is called flank pain.     · There is new blood or pus in your urine.     · You have any problems with your antibiotic medicine. Watch closely for changes in your health, and be sure to contact your doctor if:    · You are not getting better after taking an antibiotic for 2 days.     · Your symptoms go away but then come back. Where can you learn more? Go to http://www.gray.com/  Enter G974 in the search box to learn more about \"Urinary Tract Infection in Women: Care Instructions. \"  Current as of: June 29, 2020               Content Version: 12.6  © 7710-0675 [employer]. Care instructions adapted under license by [employer] (which disclaims liability or warranty for this information). If you have questions about a medical condition or this instruction, always ask your healthcare professional. [place] [address] any warranty or liability for your use of this information. Patient Education        Cawthorne Exercises for Vertigo: Care Instructions  Your Care Instructions  Simple exercises can help you regain your balance when you have vertigo. If you have Ménière's disease, benign paroxysmal positional vertigo (BPPV), or another inner ear problem, you may have vertigo off and on. Do these exercises first thing in the morning and before you go to bed. You might get dizzy when you first start them. If this happens, try to do them for at least 5 minutes.  Do a group of exercises at a time, starting at the top of the list. It may take several weeks before you can do all the exercises without feeling dizzy. Follow-up care is a key part of your treatment and safety. Be sure to make and go to all appointments, and call your doctor if you are having problems. It's also a good idea to know your test results and keep a list of the medicines you take. How can you care for yourself at home? Exercise 1  While sitting on the side of the bed and holding your head still:  · Look up as far as you can. · Look down as far as you can. · Look from side to side as far as you can. · Stretch your arm straight out in front of you. Focus on your index finger. Continue to focus on your finger while you bring it to your nose. Exercise 2  While sitting on the side of the bed:  · Bring your head as far back as you can. · Bring your head forward to touch your chin to your chest.  · Turn your head from side to side. · Do these exercises first with your eyes open. Then try with your eyes closed. Exercise 3  While sitting on the side of the bed:  · Shrug your shoulders straight upward, then relax them. · Bend over and try to touch the ground with your fingers. Then go back to a sitting position. · Toss a small ball from one hand to the other. Throw the ball higher than your eyes so you have to look up. Exercise 4  While standing (with someone close by if you feel uncomfortable):  · Repeat Exercise 1.  · Repeat Exercise 2.  · Pass a ball between your legs and above your head. · Sit down and then stand up. Repeat. Turn around in a [place] a different way each time you stand. · With someone close by to help you, try the above exercises with your eyes closed. Exercise 5  In a room that is cleared of obstacles:  · Walk to a corner of the room, turn to your right, and walk back to the starting point. Now, repeat and turn left. · Walk up and down a slope. Now try stairs. · While holding on to someone's arm, try these exercises with your eyes closed. When should you call for help?   Watch closely for changes in your health, and be sure to contact your doctor if:    · You do not get better as expected. Where can you learn more? Go to http://www.gray.com/  Enter O443 in the search box to learn more about \"Cawthorne Exercises for Vertigo: Care Instructions. \"  Current as of: April 15, 2020               Content Version: 12.6  © 9680-9642 [employer]. Care instructions adapted under license by [employer] (which disclaims liability or warranty for this information). If you have questions about a medical condition or this instruction, always ask your healthcare professional. [address] any warranty or liability for your use of this information.

## 2020-12-29 NOTE — DISCHARGE SUMMARY
Discharge Summary     Patient: Ngozi Madrigal MRN: [id number]     YOB: 1955  Age: 63 y.o.   Sex: female       Admit Date: 12/28/2020    Discharge Date: 12/29/2020      Admission Diagnoses: Hyperkalemia [E87.5]  ESRD (end stage renal disease) on dialysis ([facility] [address] [N18.6, Z99.2]    Discharge Diagnoses:   Problem List as of 12/29/2020 Date Reviewed: 12/28/2020          Codes Class Noted - Resolved    ESRD (end stage renal disease) on dialysis [facility]) ICD-10-CM: N18.6, Z99.2  ICD-9-CM: 585.6, V45.11  12/28/2020 - Present        History of DVT (deep vein thrombosis) ICD-10-CM: Z86.718  ICD-9-CM: V12.51  12/28/2020 - Present        Hypothyroidism ICD-10-CM: E03.9  ICD-9-CM: 244.9  12/24/2020 - Present        Vertigo ICD-10-CM: R42  ICD-9-CM: 780.4  12/24/2020 - Present        Calculus of gallbladder with acute cholecystitis without obstruction ICD-10-CM: K80.00  ICD-9-CM: 574.00  10/9/2020 - Present        Acute recurrent maxillary sinusitis ICD-10-CM: J01.01  ICD-9-CM: 461.0  8/6/2020 - Present        Ulcer ICD-10-CM: SOH4142  ICD-9-CM: 707.9  Unknown - Present        Obesity ICD-10-CM: E66.9  ICD-9-CM: 278.00  Unknown - Present        Migraine ICD-10-CM: D86.620  ICD-9-CM: 346.90  Unknown - Present        Hypertension ICD-10-CM: I10  ICD-9-CM: 401.9  Unknown - Present        Hypercholesteremia ICD-10-CM: E78.00  ICD-9-CM: 272.0  Unknown - Present        GERD (gastroesophageal reflux disease) ICD-10-CM: K21.9  ICD-9-CM: 530.81  Unknown - Present        Burning with urination ICD-10-CM: R30.0  ICD-9-CM: 788.1  Unknown - Present    Overview Signed 2/13/2017 12:02 PM by Laquita Giron A     frequent uti             Arrhythmia ICD-10-CM: I49.9  ICD-9-CM: 427.9  Unknown - Present        Hyperkalemia ICD-10-CM: E87.5  ICD-9-CM: 276.7  11/13/2016 - Present        Pruritus ICD-10-CM: L29.9  ICD-9-CM: 698.9  9/29/2016 - Present        Chronic kidney disease, stage III (moderate) ICD-10-CM: N18.30  ICD-9-CM: 585.3 9/27/2016 - Present        Recurrent urinary tract infection ICD-10-CM: N39.0  ICD-9-CM: 599.0  9/27/2016 - Present        Nausea and vomiting ICD-10-CM: R11.2  ICD-9-CM: 787.01  4/10/2016 - Present        Diverticulitis of intestine ICD-10-CM: K57.92  ICD-9-CM: 562.11  4/2/2016 - Present        Cystic disease of kidney ICD-10-CM: Q61.9  ICD-9-CM: 753.10  3/16/2015 - Present        Gastroenteritis ICD-10-CM: K52.9  ICD-9-CM: 558.9  2/23/2015 - Present        Anemia ICD-10-CM: D64.9  ICD-9-CM: 285.9  11/10/2014 - Present        Disease due to gram-negative bacillus ICD-10-CM: B96.89  ICD-9-CM: 041.85  10/17/2014 - Present    Overview Signed 2/13/2017 11:58 AM by Adolfo LLANOS     Overview:   Acinetobacter baumannii Septicemia and UTI 10/14/2014 (cultures at [facility])             Fever ICD-10-CM: R50.9  ICD-9-CM: 780.60  10/14/2014 - Present        Drug-induced hyperglycemia ICD-10-CM: R73.9, T50.905A  ICD-9-CM: 790.29, E947.9  6/28/2014 - Present        Exacerbation of asthma ICD-10-CM: J45.901  ICD-9-CM: 493.92  6/25/2014 - Present        Systemic inflammatory response syndrome (SIRS) (HCC) ICD-10-CM: R65.10  ICD-9-CM: 995.90  6/23/2014 - Present        Kidney transplant rejection ICD-10-CM: T86.11  ICD-9-CM: 996.81  4/10/2014 - Present    Overview Signed 2/13/2017 11:58 AM by Lola Sanchez     Overview:   Collected: Correne Cheadle Dr: Dempsey Sacks MD    Case Number: [id number]  [address] [place] [address]    Case Number: [id number]    DIAGNOSIS:  ----------  ALLOGRAFT KIDNEY, NEEDLE BIOPSY (14 YEARS, 2 MONTHS):  - SUBOPTIMAL SPECIMEN: RENAL MEDULLA, INCLUDING DEEP MEDULLA WITH  FOCAL BENIGN UROTHELIAL EPITHELIUM. - MILD NEUTROPHILIC TUBULITIS WITH INTRALUMINAL NEUTROPHILS,  CORRELATE WITH URINE CULTURE TO RULE OUT BACTERIAL INFECTION. - MILD LYMPHOCYTIC TUBULITIS CONSISTENT WITH BORDERLINE CHANGE:  (\"SUSPICIOUS\" FOR ACUTE CELLULAR REJECTION) AS PER BANFF SCHEMA.   - CONGESTED PERITUBULAR CAPILLARIES (NON-SPECIFIC), BUT RENAL  VEIN THROMBOSIS OR STENOSIS SHOULD BE EXCLUDED CLINICALLY. - FOCAL SMALL INTERSTITIAL COLLECTION OF CAST MATERIAL  (NON-SPECIFIC), BUT  URINARY OBSTRUCTION SHOULD BE EXCLUDED CLINICALLY. - NO DEFINITIVE STAINING FOR POLYOMAVIRUSES (SEE DESCRIPTION). - FOCAL C4D REACTIVITY ALONG PERITUBULAR CAPILLARY WALLS WITH  HIGH NON-SPECIFIC BACKGROUND STAINING; SIGNIFICANCE UNCERTAIN AND  CORRELATION WITH FLOW PRA IS SUGGESTED TO EXCLUDE EARLY HUMORAL  REJECTION. - SMALL VESSEL SCLEROSIS, MILD TO MODERATE TO SEVERE.  - TUBULAR ATROPHY AND INTERSTITIAL FIBROSIS CANNOT BE ADEQUATELY  ASSESSED  IN THE ABSENCE OF RENAL CORTEX. Re-Bx - Collected: Erin Diaz Dr: Darryle Mean MD    Case Number: [id number]  [address] [place] [address]    Case Number: [id number]    DIAGNOSIS:  ----------  ALLOGRAFT KIDNEY, NEEDLE BIOPSY (12 YEARS, 2 MONTHS):  - BORDERLINE CHANGE (\"SUSPICIOUS\" FOR ACUTE CELLULAR REJECTION)  TO FOCAL  MILD ACUTE CELLULAR REJECTION (BANFF TYPE 1A) (SEE COMMENT). - MILD NEUTROPHILIC INTERSTITIAL INFLAMMATION, CORRELATE WITH  URINE CULTURE  TO RULE OUT SUPERIMPOSED BACTERIAL INFECTION. - SMALL INTERSTITIAL COLLECTIONS OF PAS-POSITIVE CAST MATERIAL  AND CYSTICALLY DILATED PACKER'S SPACE WITH PAS-POSITIVE  PROTEINACEOUS FILTRATE (SEE  COMMENT). - ACUTE ISCHEMIC-TYPE TUBULAR INJURY IS NOTED.  - FOCAL C4D REACTIVITY ALONG PERITUBULAR CAPILLARY WALLS;  SIGNIFICANCE  UNCERTAIN AND CORRELATION WITH FLOW PRA IS SUGGESTED TO EXCLUDE  EARLY  HUMORAL REJECTION. - NEGATIVE IMMUNOSTAIN FOR POLYOMAVIRUSES (BK, HOMER).   - CHRONIC CHANGES: GLOBAL SCLEROSIS IN APPROXIMATELY 14 OUT OF 60  (23%)  GLOMERULI, FOCAL SEGMENTAL GLOMERULOSCLEROSIS IN APPROXIMATELY  2 OUT OF  46 (4%) NON-OBSOLESCENT GLOMERULI, MODERATE TO SEVERE  ARTERIOSCLEROSIS,  MILD TO MODERATE TO SEVERE ARTERIOLAR HYALINE SCLEROSIS, AND  MODERATE  INTERSTITIAL FIBROSIS/TUBULAR ATROPHY (SEE COMMENT).              Acute renal failure (HCC) ICD-10-CM: N17.9  ICD-9-CM: 584.9  4/7/2014 - Present        Renal transplant disorder ICD-10-CM: T86.10  ICD-9-CM: 996.81  4/7/2014 - Present        Systemic infection ([facility] ICD-10-CM: A41.9  ICD-9-CM: 038.9  1/7/2014 - Present        Asthma ICD-10-CM: J45.909  ICD-9-CM: 493.90  8/21/2013 - Present        Chronic obstructive pulmonary disease ([facility] ICD-10-CM: J44.9  ICD-9-CM: 736  8/21/2013 - Present        Diverticular disease of large intestine ICD-10-CM: K57.30  ICD-9-CM: 562.10  8/21/2013 - Present        Essential hypertension ICD-10-CM: I10  ICD-9-CM: 401.9  8/21/2013 - Present        Gastroesophageal reflux disease ICD-10-CM: K21.9  ICD-9-CM: 530.81  8/21/2013 - Present        Hay fever ICD-10-CM: J30.1  ICD-9-CM: 477.9  8/21/2013 - Present        Hyperlipidemia ICD-10-CM: E78.5  ICD-9-CM: 272.4  8/21/2013 - Present        UTI (urinary tract infection) ICD-10-CM: N39.0  ICD-9-CM: 599.0  8/21/2013 - Present        Fecal incontinence ICD-10-CM: R15.9  ICD-9-CM: 787.60  4/17/2013 - Present        History of kidney transplant ICD-10-CM: Z94.0  ICD-9-CM: V42.0  4/30/2012 - Present        CMV (cytomegalovirus) antibody positive ICD-10-CM: R89.4  ICD-9-CM: 795.79  4/30/2012 - Present    Overview Signed 2/13/2017 11:58 AM by Destinee LLANOS     Overview:   Donor negative             Hematuria ICD-10-CM: R31.9  ICD-9-CM: 599.70  4/30/2012 - Present        Migraine without status migrainosus, not intractable ICD-10-CM: Z51.723  ICD-9-CM: 346.90  2/24/2010 - Present        Adiposity ICD-10-CM: E66.9  ICD-9-CM: 278.00  2/1/2010 - Present        Renal cyst ICD-10-CM: N28.1  ICD-9-CM: 753.10  1/1/2002 - Present    Overview Signed 2/13/2017 12:01 PM by Destinee LLANOS     kidney transplant                   Hospital Course: Lucky Sever is a 63 y.o. female who lives with her sister has a  significant PMHx of asthma, IBS, HTN, HLD, obesity and S/P kidney transplant 2002 and  migraines presents to the ED with complaints of dizziness and lower abdominal pain. Pt spoke with her PCP one week ago and was dx with a UTI and vertigo, prescribed abx and meclizine. Pt has taken medications as prescribed w/o relief, prompting her visit. Pt states she feels dizzy, described as feeling off-balance, worse with movement. She also notes subjective fever, near-syncope, nausea/vomiting (2 episodes last night, 2 this morning) and lower abdominal pain rated 9/10. Pt was scheduled to have gallstones removed, but was notified this morning that the surgery was cancelled. During this admission patient was treated for hyperkalemia and has since decreased to 3.1. Received dialysis on 12/28/2020, 3 hours completed with 500 ml fluid removed, tolerated 100% with no acute events reported. Pt examined and seen at bedside. Alert & oriented X3 with no acute distress, discomfort or pain. Reports dull abd pain 4/10 in right upper abd possible due to IBS/cholelithiasis. VSS and satting >94% on RA. No acute event reported overnight. Left arm AV fistula + thrill & bruit. Will resume Keflex for UTI for 6 days post discharge for UTI beginning tomorrow. Current INR at 1.2, will increase Coumadin to 6mg daily with repeat INR at next dialysis treatment on Friday, 1/1/2021. Pt reports chronic right arm pain that began after cardiac catheterization. Discussed plan to discharge with patient and patient agrees with all questions answered. Approximate time spent on discharge was approx 45 minutes to include coordination of care and discharge planning. Review of Systems:  - CONSTITUTIONAL: Denies fatigue, weight loss, fever and chills. - HEENT: Reports blurred vision Denies changes in and hearing.    - RESPIRATORY: Denies SOB and cough. - CV: Denies palpitations and CP.     - GI: Reports abdominal pain, nausea, vomiting, Denies diarrhea and constipation.  Last BM 12/27/2020    - : Denies dysuria and urinary frequency. Voiding spontaneously. - MSK: Denies myalgia and joint pain. - SKIN: Denies rash and pruritus.    - NEUROLOGICAL: Reports severe dizziness, weakness, headache and syncope. - PSYCHIATRIC: Denies recent changes in mood. Denies anxiety and depression. Physical Exam:  - Ryan Zamarripa. Alert and oriented x 3. No acute distress. Well-nourished.      - HEENT: EOMI. Anicteric. PERRLA,Moist mucous membranes. No scleral icterus. No cervical lymphadenopathy. Oropharynx moist without any lesions     -NECK: Supple, no tracheal deviation, no JVD, no significant lymphadenopathy, no thyromegaly noted. - LUNGS: Clear to auscultation bilaterally. No accessory muscle use. Chest symmetrical, No wheezing, rales, rhonchi noted. Appropriate respiratory effort.     - CARDIOVASCULAR: Left arm AV fistula + thrill & bruit. Regular rate and rhythm. No murmur, rubs, gallops, No edema appreciated. S1 & S2 audible. - ABDOMEN: Soft, obese non-tender and obesely distended. No palpable masses. , lesions, hepatomegaly. Bowels active X4 quadrants.      - SKIN: Warm, dry, intact, no bruising, lesions, or rashes noted. Color appropriate for ethnicity.     - MUSCULOSKELETAL: Ambulates independently, no deformities, Full ROM     - NEUROLOGIC: No focal neurological deficits. CN II-XII grossly intact, Muscle strength 5/5, both U & L bilateral DTR in lower extremities 2+. - PSYCHIATRIC: Calm & Cooperative. Appropriate mood and affect.   Wt Readings from Last 3 Encounters:   12/28/20 73.9 kg (163 lb)   10/12/20 72.6 kg (160 lb)   10/04/20 72.6 kg (160 lb)     Temp Readings from Last 3 Encounters:   12/29/20 97.7 °F (36.5 °C)   10/12/20 98 °F (36.7 °C)   10/04/20 97.5 °F (36.4 °C)     BP Readings from Last 3 Encounters:   12/29/20 (!) 122/55   10/12/20 (!) 170/49   10/04/20 138/72     Pulse Readings from Last 3 Encounters:   12/29/20 73   10/12/20 74   10/04/20 68       Lab Results   Component Value Date/Time    WBC 5.8 12/29/2020 03:55 AM    HGB 10.3 (L) 12/29/2020 03:55 AM    HCT 32.5 (L) 12/29/2020 03:55 AM    PLATELET 304 43/78/5738 03:55 AM    .5 (H) 12/29/2020 03:55 AM     Lab Results   Component Value Date/Time    Sodium 138 12/29/2020 03:55 AM    Potassium 3.1 (L) 12/29/2020 03:55 AM    Chloride 99 12/29/2020 03:55 AM    CO2 27 12/29/2020 03:55 AM    Anion gap 12 12/29/2020 03:55 AM    Glucose 84 12/29/2020 03:55 AM    BUN 21 12/29/2020 03:55 AM    Creatinine 4.80 (H) 12/29/2020 03:55 AM    BUN/Creatinine ratio 4 12/29/2020 03:55 AM    GFR est AA 11 12/29/2020 03:55 AM    GFR est non-AA 9 12/29/2020 03:55 AM    Calcium 7.9 (L) 12/29/2020 03:55 AM    Bilirubin, total 0.7 12/28/2020 01:35 PM    ALT (SGPT) 56 (H) 12/28/2020 01:35 PM    Alk. phosphatase 138 (H) 12/28/2020 01:35 PM    Protein, total 7.7 12/28/2020 01:35 PM    Albumin 3.9 12/28/2020 01:35 PM    Globulin 3.8 12/28/2020 01:35 PM    A-G Ratio 1.0 12/28/2020 01:35 PM          Significant Diagnostic Studies:   Ct Head Wo Cont    Result Date: 12/28/2020  IMPRESSION: 1. No acute intracranial abnormality. 2. Mild periventricular matter low-attenuation; nonspecific and favored to reflect sequela of chronic ischemic microvascular change     Ct Abd Pelv Wo Cont    Result Date: 12/28/2020  IMPRESSION: 1. Mild inflammatory stranding adjacent to the pancreas and second/third portions of the duodenum as above.   > Overall imaging appearance is nonspecific and may relate to reactive duodenitis or pancreatitis. No pancreatic ductal dilatation or evidence of suggest pancreatic necrosis. > Cholelithiasis without evidence of cholecystitis. 2. Right lower quadrant transplant kidney with intrarenal calcifications. No hydronephrosis. 3. Multiple colonic diverticula without evidence of diverticulitis. 4. Underdistended urinary bladder which may relate to apparent urinary bladder wall thickening. Mild perivesicular stranding noted.  Correlation for cystitis maybe helpful as appropriate clinically. Discharge Medications:   Current Discharge Medication List      CONTINUE these medications which have CHANGED    Details   warfarin (COUMADIN) 2 mg tablet Take 3 Tabs by mouth daily. Indications: blood clot in a deep vein of the extremities  Qty: 30 Tab, Refills: 0         CONTINUE these medications which have NOT CHANGED    Details   meclizine (ANTIVERT) 25 mg tablet Take 1 Tab by mouth three (3) times daily as needed for Dizziness for up to 10 days. Qty: 21 Tab, Refills: 0    Associated Diagnoses: Dizziness      cephALEXin (KEFLEX) 500 mg capsule Take 1 Cap by mouth four (4) times daily for 10 days. Qty: 40 Cap, Refills: 0    Associated Diagnoses: Urinary tract infection without hematuria, site unspecified      Dexilant 60 mg CpDB capsule (delayed release) TAKE ONE CAPSULE BY MOUTH EVERY DAY  Qty: 30 Cap, Refills: 5      sucralfate (CARAFATE) 1 gram tablet TAKE 1 TABLET BY MOUTH FOUR TIMES DAILY  Qty: 360 Tab, Refills: 2    Comments: **Patient requests 90 days supply**      dicyclomine (BENTYL) 10 mg capsule TAKE 1 CAPSULE BY MOUTH FOUR TIMES DAILY  Qty: 120 Cap, Refills: 1      ondansetron (ZOFRAN ODT) 8 mg disintegrating tablet 1 p.o. every 6 to 8 hours for nausea and vomiting this does not need to be a tablet that orally dissolves. Qty: 8 Tab, Refills: 2    Associated Diagnoses: Calculus of gallbladder with acute cholecystitis without obstruction      amLODIPine (NORVASC) 10 mg tablet TAKE 1 TABLET BY MOUTH EVERY DAY  Qty: 90 Tab, Refills: 3      omeprazole (PRILOSEC) 20 mg capsule TAKE ONE CAPSULE BY MOUTH TWICE DAILY  Qty: 180 Cap, Refills: 2      metoprolol tartrate (LOPRESSOR) 50 mg tablet Take 50 mg by mouth daily. aluminum & magnesium hydroxide-simethicone (Maalox Maximum Strength) 400-400-40 mg/5 mL suspension Take 10 mL by mouth every six (6) hours as needed for Indigestion.   Qty: 250 mL, Refills: 0      simvastatin (ZOCOR) 20 mg tablet TAKE 1 TABLET BY MOUTH DAILY AS DIRECTED. Qty: 90 Tab, Refills: 1      amiodarone (CORDARONE) 200 mg tablet Take 200 mg by mouth daily. ESTRACE 0.01 % (0.1 mg/gram) vaginal cream INSERT 2 GRAMS INTO VAGINA EVERY MONDAY AND THURSDAY  Qty: 42.5 g, Refills: 5      cranberry extract 425 mg cap 425 mg. LORazepam (ATIVAN) 0.5 mg tablet 0.5 mg.      fluticasone (FLONASE) 50 mcg/actuation nasal spray 1 Spray.      hyoscyamine SL (LEVSIN/SL) 0.125 mg SL tablet 0.125 mg by SubLINGual route every four (4) hours as needed. albuterol (PROVENTIL VENTOLIN) 2.5 mg /3 mL (0.083 %) nebulizer solution by Nebulization route as needed. fluticasone-salmeterol (ADVAIR DISKUS) 250-50 mcg/dose diskus inhaler Take 1 Puff by inhalation every twelve (12) hours. montelukast (SINGULAIR) 10 mg tablet Take 10 mg by mouth daily. EPINEPHrine (EPIPEN) 0.3 mg/0.3 mL injection 0.3 mg.           Disposition: home  Discharge Condition: Good  Activity: Activity as Tolerated. Diet: Resume previous diet  Wound Care: None needed  Consults: None    Follow-up Appointments   Procedures    FOLLOW UP VISIT Appointment in: One Week Follow up with PCP and nephrologist in 1 week. Follow up with PCP and nephrologist in 1 week. Standing Status:   Standing     Number of Occurrences:   1     Order Specific Question:   Appointment in     Answer: One Week       PLAN DURING HOSPITALIZATION:  History of DVT (deep vein thrombosis)  Continue Coumadin 4mg daily  PT/INR in AM    ESRD (end stage renal disease) on dialysis [facility])  Dialysis Monday & Fridays  Dr. Jeremy Roberts Nephrologist  Kidney transplant in 2002  B&C 73 & 11.3 on admission  B&C 21 & 4.8 on discharge  Baseline unk  Dialyzed 12/28/2020    Vertigo  Diagnosed with vertigo 1 week ago  Rx'd Meclizine with no improvement  C/O dizziness, imbalance, exacerbated with movement      UTI (urinary tract infection)  Continue Keflex 500mg QID as prescribed.     Hyperkalemia  -K+ 6.5 on admission  -Pravin gluc 1gm administered in ED  -10 units of Humulin ordered in ED  -30gm Kayexalate ordered in ED  -3.1 at discharge  -40meq K+ ordered X1 dose  -EKG revealed NSR @78bpm  -last dialysis 12/28/2020      Hyperlipidemia  Continue Simvastatin 20mg daily    Essential hypertension  Current /73  Continue amlodipine 10mg daily  Monitor BP per unit protocol      Chronic obstructive pulmonary disease ([facility] [address]  Continue home duonebs  Supportive care  [facility] as needed  Maintain O2 levels >92%    Asthma  Continue inhalers as prescribed  Maintain O2 saturation > 92%     History of kidney transplant  Kidney transplant in 2002        Signed By: Elizabeth Alanis NP     December 29, 2020

## 2020-12-29 NOTE — DIALYSIS
[facility] Dialysis Nurse Cathie Langston RN spoke to Dr Hina Spence (covering for Dr Meryl Casanova) 12/29/20 at 12:15 pm and 12:19 pm.   Per Dr Hina Spence, [facility] hospital dialysis treatment currently planned for Friday 01/01/2, if patient is still in hospital. Call Dr Hina Spenec directly via his pager [phone] if further discussion / orders needed.

## 2020-12-31 LAB
HBV SURFACE AG SER QL: 0.4 INDEX
HBV SURFACE AG SER QL: NEGATIVE

## 2021-01-08 ENCOUNTER — APPOINTMENT (OUTPATIENT)
Dept: CT IMAGING | Age: 66
End: 2021-01-08
Attending: INTERNAL MEDICINE
Payer: MEDICARE

## 2021-01-08 ENCOUNTER — HOSPITAL ENCOUNTER (EMERGENCY)
Age: 66
Discharge: HOME | End: 2021-01-09
Attending: INTERNAL MEDICINE
Payer: MEDICARE

## 2021-01-08 ENCOUNTER — HOSPITAL ENCOUNTER (OUTPATIENT)
Dept: LAB | Age: 66
Discharge: HOME | End: 2021-01-08
Payer: MEDICARE

## 2021-01-08 DIAGNOSIS — R94.31 QT PROLONGATION: ICD-10-CM

## 2021-01-08 DIAGNOSIS — R10.31 ABDOMINAL PAIN, RIGHT LOWER QUADRANT: Primary | ICD-10-CM

## 2021-01-08 LAB
ALBUMIN SERPL-MCNC: 3.7 G/DL (ref 3.5–4.7)
ALBUMIN SERPL-MCNC: 3.9 G/DL (ref 3.5–4.7)
ALBUMIN/GLOB SERPL: 1.2 {RATIO}
ALBUMIN/GLOB SERPL: 1.3 {RATIO}
ALP SERPL-CCNC: 102 U/L (ref 38–126)
ALP SERPL-CCNC: 90 U/L (ref 38–126)
ALT SERPL-CCNC: 19 U/L (ref 3–52)
ALT SERPL-CCNC: 24 U/L (ref 3–52)
ANION GAP SERPL CALC-SCNC: 10 MMOL/L
ANION GAP SERPL CALC-SCNC: 11 MMOL/L
AST SERPL W P-5'-P-CCNC: 23 U/L (ref 14–74)
AST SERPL W P-5'-P-CCNC: 35 U/L (ref 14–74)
BASOPHILS # BLD: 0.1 K/UL (ref 0–0.1)
BASOPHILS NFR BLD: 1 % (ref 0–2)
BILIRUB DIRECT SERPL-MCNC: 0.1 MG/DL (ref 0–0.3)
BILIRUB SERPL-MCNC: 0.7 MG/DL (ref 0.2–1)
BILIRUB SERPL-MCNC: 0.8 MG/DL (ref 0.2–1)
BUN SERPL-MCNC: 19 MG/DL (ref 9–21)
BUN SERPL-MCNC: 55 MG/DL (ref 9–21)
BUN/CREAT SERPL: 5
BUN/CREAT SERPL: 7
CA-I BLD-MCNC: 7.1 MG/DL (ref 8.5–10.5)
CA-I BLD-MCNC: 7.5 MG/DL (ref 8.5–10.5)
CHLORIDE SERPL-SCNC: 110 MMOL/L (ref 94–111)
CHLORIDE SERPL-SCNC: 95 MMOL/L (ref 94–111)
CO2 SERPL-SCNC: 18 MMOL/L (ref 21–33)
CO2 SERPL-SCNC: 30 MMOL/L (ref 21–33)
CREAT SERPL-MCNC: 4.2 MG/DL (ref 0.7–1.2)
CREAT SERPL-MCNC: 8.4 MG/DL (ref 0.7–1.2)
EOSINOPHIL # BLD: 0.1 K/UL (ref 0–0.4)
EOSINOPHIL NFR BLD: 2 % (ref 0–5)
ERYTHROCYTE [DISTWIDTH] IN BLOOD BY AUTOMATED COUNT: 15.6 % (ref 11.6–14.5)
ERYTHROCYTE [DISTWIDTH] IN BLOOD BY AUTOMATED COUNT: 15.8 % (ref 11.6–14.5)
GLOBULIN SER CALC-MCNC: 3 G/DL
GLOBULIN SER CALC-MCNC: 3.1 G/DL
GLUCOSE SERPL-MCNC: 73 MG/DL (ref 70–110)
GLUCOSE SERPL-MCNC: 84 MG/DL (ref 70–110)
HCT VFR BLD AUTO: 28.8 % (ref 35–45)
HCT VFR BLD AUTO: 33.8 % (ref 35–45)
HGB BLD-MCNC: 10.4 G/DL (ref 12–16)
HGB BLD-MCNC: 9 G/DL (ref 12–16)
IMM GRANULOCYTES # BLD AUTO: 0 K/UL
IMM GRANULOCYTES NFR BLD AUTO: 0 %
LACTATE SERPL-SCNC: 1.2 MMOL/L (ref 0.5–2)
LIPASE SERPL-CCNC: 35 U/L (ref 10–57)
LYMPHOCYTES # BLD: 1.8 K/UL (ref 0.9–3.6)
LYMPHOCYTES NFR BLD: 25 % (ref 21–52)
MCH RBC QN AUTO: 31.8 PG (ref 24–34)
MCH RBC QN AUTO: 32.4 PG (ref 24–34)
MCHC RBC AUTO-ENTMCNC: 30.8 G/DL (ref 31–37)
MCHC RBC AUTO-ENTMCNC: 31.3 G/DL (ref 31–37)
MCV RBC AUTO: 103.4 FL (ref 74–97)
MCV RBC AUTO: 103.6 FL (ref 74–97)
MONOCYTES # BLD: 0.7 K/UL (ref 0.05–1.2)
MONOCYTES NFR BLD: 9 % (ref 3–10)
NEUTS SEG # BLD: 4.5 K/UL (ref 1.8–8)
NEUTS SEG NFR BLD: 63 % (ref 40–73)
PLATELET # BLD AUTO: 191 K/UL (ref 135–420)
PLATELET # BLD AUTO: 200 K/UL (ref 135–420)
PMV BLD AUTO: 9 FL (ref 9.2–11.8)
PMV BLD AUTO: 9.1 FL (ref 9.2–11.8)
POTASSIUM SERPL-SCNC: 4 MMOL/L (ref 3.2–5.1)
POTASSIUM SERPL-SCNC: 5.3 MMOL/L (ref 3.2–5.1)
PROT SERPL-MCNC: 6.7 G/DL (ref 6.1–8.4)
PROT SERPL-MCNC: 7 G/DL (ref 6.1–8.4)
RBC # BLD AUTO: 2.78 M/UL (ref 4.2–5.3)
RBC # BLD AUTO: 3.27 M/UL (ref 4.2–5.3)
SODIUM SERPL-SCNC: 136 MMOL/L (ref 135–145)
SODIUM SERPL-SCNC: 138 MMOL/L (ref 135–145)
TROPONIN I SERPL-MCNC: <0.02 NG/ML (ref 0.02–0.05)
TSH SERPL DL<=0.05 MIU/L-ACNC: 17.83 UIU/ML (ref 0.35–6.2)
WBC # BLD AUTO: 5.6 K/UL (ref 4.6–13.2)
WBC # BLD AUTO: 7.2 K/UL (ref 4.6–13.2)

## 2021-01-08 PROCEDURE — 96374 THER/PROPH/DIAG INJ IV PUSH: CPT

## 2021-01-08 PROCEDURE — 36415 COLL VENOUS BLD VENIPUNCTURE: CPT

## 2021-01-08 PROCEDURE — 80076 HEPATIC FUNCTION PANEL: CPT

## 2021-01-08 PROCEDURE — 96375 TX/PRO/DX INJ NEW DRUG ADDON: CPT

## 2021-01-08 PROCEDURE — 74011250636 HC RX REV CODE- 250/636: Performed by: INTERNAL MEDICINE

## 2021-01-08 PROCEDURE — 80048 BASIC METABOLIC PNL TOTAL CA: CPT

## 2021-01-08 PROCEDURE — 85025 COMPLETE CBC W/AUTO DIFF WBC: CPT

## 2021-01-08 PROCEDURE — 83690 ASSAY OF LIPASE: CPT

## 2021-01-08 PROCEDURE — 99285 EMERGENCY DEPT VISIT HI MDM: CPT

## 2021-01-08 PROCEDURE — 84484 ASSAY OF TROPONIN QUANT: CPT

## 2021-01-08 PROCEDURE — 87186 SC STD MICRODIL/AGAR DIL: CPT

## 2021-01-08 PROCEDURE — 87077 CULTURE AEROBIC IDENTIFY: CPT

## 2021-01-08 PROCEDURE — 85027 COMPLETE CBC AUTOMATED: CPT

## 2021-01-08 PROCEDURE — 87086 URINE CULTURE/COLONY COUNT: CPT

## 2021-01-08 PROCEDURE — 80053 COMPREHEN METABOLIC PANEL: CPT

## 2021-01-08 PROCEDURE — 74176 CT ABD & PELVIS W/O CONTRAST: CPT

## 2021-01-08 PROCEDURE — 83605 ASSAY OF LACTIC ACID: CPT

## 2021-01-08 PROCEDURE — 93005 ELECTROCARDIOGRAM TRACING: CPT

## 2021-01-08 PROCEDURE — 84443 ASSAY THYROID STIM HORMONE: CPT

## 2021-01-08 RX ORDER — HYDROMORPHONE HYDROCHLORIDE 1 MG/ML
0.5 INJECTION, SOLUTION INTRAMUSCULAR; INTRAVENOUS; SUBCUTANEOUS
Status: COMPLETED | OUTPATIENT
Start: 2021-01-08 | End: 2021-01-08

## 2021-01-08 RX ORDER — ONDANSETRON 2 MG/ML
4 INJECTION INTRAMUSCULAR; INTRAVENOUS
Status: COMPLETED | OUTPATIENT
Start: 2021-01-08 | End: 2021-01-08

## 2021-01-08 RX ADMIN — HYDROMORPHONE HYDROCHLORIDE 0.5 MG: 1 INJECTION, SOLUTION INTRAMUSCULAR; INTRAVENOUS; SUBCUTANEOUS at 23:06

## 2021-01-08 RX ADMIN — ONDANSETRON 4 MG: 2 INJECTION INTRAMUSCULAR; INTRAVENOUS at 23:05

## 2021-01-09 VITALS
HEART RATE: 75 BPM | SYSTOLIC BLOOD PRESSURE: 135 MMHG | WEIGHT: 163 LBS | BODY MASS INDEX: 30 KG/M2 | OXYGEN SATURATION: 100 % | DIASTOLIC BLOOD PRESSURE: 64 MMHG | HEIGHT: 62 IN | TEMPERATURE: 98.7 F | RESPIRATION RATE: 20 BRPM

## 2021-01-09 NOTE — ED PROVIDER NOTES
EMERGENCY DEPARTMENT HISTORY AND PHYSICAL EXAM      Date: 1/8/2021  Patient Name: Con Spencer      History of Presenting Illness     Chief Complaint   Patient presents with    Abdominal Pain       History Provided By: Patient    HPI: Con Spencer, 63 y.o. female with a past medical history significant ESRD on Mon-Fri dialysis; Dr Reyes Quan - pt had renal tx '02 which failed and she has been on dialysis x 1 yr; COPD; HTN; HLD; hypothyroid; h/o DVT on coumadin; gallstones; bun/cre: 21/4.8 presents to the ED with cc of RLQ abd pain. Pt states that she went to dialysis from 1230 to 4:30 pm and during dialysis; she developed the gradual onset of a sharp; nonradiating RLQ abd pain [above the area of her failed transplant]; nothing made it better or worse; associated with nausea; had similar pain duriing her12/28/20 admission where her CT showed nonspecific pancreatis / duodenitis and gallstones. She denies fever; chills    There are no other complaints, changes, or physical findings at this time. PCP: Timothy Tarango MD    Current Outpatient Medications   Medication Sig Dispense Refill    warfarin (COUMADIN) 2 mg tablet Take 3 Tabs by mouth daily. Indications: blood clot in a deep vein of the extremities 30 Tab 0    Dexilant 60 mg CpDB capsule (delayed release) TAKE ONE CAPSULE BY MOUTH EVERY DAY 30 Cap 5    sucralfate (CARAFATE) 1 gram tablet TAKE 1 TABLET BY MOUTH FOUR TIMES DAILY 360 Tab 2    dicyclomine (BENTYL) 10 mg capsule TAKE 1 CAPSULE BY MOUTH FOUR TIMES DAILY 120 Cap 1    ondansetron (ZOFRAN ODT) 8 mg disintegrating tablet 1 p.o. every 6 to 8 hours for nausea and vomiting this does not need to be a tablet that orally dissolves. 8 Tab 2    amLODIPine (NORVASC) 10 mg tablet TAKE 1 TABLET BY MOUTH EVERY DAY 90 Tab 3    omeprazole (PRILOSEC) 20 mg capsule TAKE ONE CAPSULE BY MOUTH TWICE DAILY 180 Cap 2    metoprolol tartrate (LOPRESSOR) 50 mg tablet Take 50 mg by mouth daily.       aluminum & magnesium hydroxide-simethicone (Maalox Maximum Strength) 400-400-40 mg/5 mL suspension Take 10 mL by mouth every six (6) hours as needed for Indigestion. 250 mL 0    simvastatin (ZOCOR) 20 mg tablet TAKE 1 TABLET BY MOUTH DAILY AS DIRECTED. 90 Tab 1    amiodarone (CORDARONE) 200 mg tablet Take 200 mg by mouth daily.  ESTRACE 0.01 % (0.1 mg/gram) vaginal cream INSERT 2 GRAMS INTO VAGINA EVERY MONDAY AND THURSDAY 42.5 g 5    cranberry extract 425 mg cap 425 mg.  LORazepam (ATIVAN) 0.5 mg tablet 0.5 mg.      fluticasone (FLONASE) 50 mcg/actuation nasal spray 1 Spray.  EPINEPHrine (EPIPEN) 0.3 mg/0.3 mL injection 0.3 mg.      hyoscyamine SL (LEVSIN/SL) 0.125 mg SL tablet 0.125 mg by SubLINGual route every four (4) hours as needed.  albuterol (PROVENTIL VENTOLIN) 2.5 mg /3 mL (0.083 %) nebulizer solution by Nebulization route as needed.  fluticasone-salmeterol (ADVAIR DISKUS) 250-50 mcg/dose diskus inhaler Take 1 Puff by inhalation every twelve (12) hours.  montelukast (SINGULAIR) 10 mg tablet Take 10 mg by mouth daily.            Past History     Past Medical History:  Past Medical History:   Diagnosis Date    Anemia NEC     Arrhythmia     Asthma     Asthma     Burning with urination     frequent uti    CMV (cytomegalovirus) antibody positive     Dyspepsia and other specified disorders of function of stomach     stomach ulcer    Essential hypertension     GERD (gastroesophageal reflux disease)     Hypercholesteremia     Hypertension     Migraine     Obesity     Renal cyst 2002    kidney transplant     Ulcer        Past Surgical History:  Past Surgical History:   Procedure Laterality Date    ENDOSCOPY, COLON, DIAGNOSTIC      with Dr. Felicia Keith    HX GI      ulcer    HX HEENT      sinus surg    HX RENAL TRANSPLANT      VASCULAR SURGERY PROCEDURE UNLIST      shunt in prep for dialysis       Family History:  Family History   Problem Relation Age of Onset    Colon Polyps Brother Social History:  Social History     Tobacco Use    Smoking status: Never Smoker    Smokeless tobacco: Never Used   Substance Use Topics    Alcohol use: No    Drug use: No       Allergies: Allergies   Allergen Reactions    Aspirin Unknown (comments) and Rash    Bactrim [Sulfamethoxazole-Trimethoprim] Unknown (comments) and Rash    Bromfenac Unknown (comments) and Rash    Ceftriaxone Rash    Ibuprofen Unknown (comments) and Rash    Ketorolac Tromethamine Unknown (comments) and Rash    Morphine Unknown (comments) and Rash    Relafen [Nabumetone] Unknown (comments) and Rash    Rifampin Unknown (comments) and Rash    Vancomycin Unknown (comments) and Rash     Pt reports causes itching, takes benadryl prior to use. Pt denies anaphylaxis. Requires benadryl with each vancomycin dose         Review of Systems     Review of Systems   Constitutional: Negative for appetite change, chills and fever. HENT: Negative for sore throat. Eyes: Negative for visual disturbance. Respiratory: Negative for cough, chest tightness and shortness of breath. Cardiovascular: Negative for chest pain and leg swelling. Gastrointestinal: Positive for abdominal pain and nausea. Negative for blood in stool, diarrhea and vomiting. Genitourinary: Negative for dysuria and urgency. Musculoskeletal: Negative for back pain. Skin: Negative for rash. Neurological: Negative for seizures and headaches. Psychiatric/Behavioral: Negative for agitation and confusion. Physical Exam     Physical Exam  Vitals signs and nursing note reviewed. Constitutional:       General: She is not in acute distress. Appearance: She is well-developed. She is obese. She is not ill-appearing, toxic-appearing or diaphoretic. HENT:      Head: Normocephalic. Eyes:      Extraocular Movements: Extraocular movements intact. Pupils: Pupils are equal, round, and reactive to light.    Cardiovascular:      Rate and Rhythm: Normal rate and regular rhythm. Heart sounds: Normal heart sounds. No murmur. Pulmonary:      Effort: Pulmonary effort is normal.      Breath sounds: Normal breath sounds. No wheezing, rhonchi or rales. Abdominal:      General: Abdomen is protuberant. Bowel sounds are normal. There is no distension or abdominal bruit. Palpations: Abdomen is soft. There is no hepatomegaly. Tenderness: There is abdominal tenderness in the right lower quadrant. There is no guarding. Hernia: No hernia is present. Comments: Mild TTP in the RLQ ; no pain in the RUQ gallbladder area; no tenderness of her failed transplant; no rebound   Skin:     General: Skin is warm and dry. Neurological:      Mental Status: She is alert and oriented to person, place, and time. Lab and Diagnostic Study Results     Labs -     Recent Results (from the past 12 hour(s))   CBC WITH AUTOMATED DIFF    Collection Time: 01/08/21 10:13 PM   Result Value Ref Range    WBC 7.2 4.6 - 13.2 K/uL    RBC 2.78 (L) 4.20 - 5.30 M/uL    HGB 9.0 (L) 12.0 - 16.0 g/dL    HCT 28.8 (L) 35.0 - 45.0 %    .6 (H) 74.0 - 97.0 FL    MCH 32.4 24.0 - 34.0 PG    MCHC 31.3 31.0 - 37.0 g/dL    RDW 15.6 (H) 11.6 - 14.5 %    PLATELET 961 459 - 551 K/uL    MPV 9.0 (L) 9.2 - 11.8 FL    NEUTROPHILS 63 40 - 73 %    LYMPHOCYTES 25 21 - 52 %    MONOCYTES 9 3 - 10 %    EOSINOPHILS 2 0 - 5 %    BASOPHILS 1 0 - 2 %    IMMATURE GRANULOCYTES 0 %    ABS. NEUTROPHILS 4.5 1.8 - 8.0 K/UL    ABS. LYMPHOCYTES 1.8 0.9 - 3.6 K/UL    ABS. MONOCYTES 0.7 0.05 - 1.2 K/UL    ABS. EOSINOPHILS 0.1 0.0 - 0.4 K/UL    ABS. BASOPHILS 0.1 0.0 - 0.1 K/UL    ABS. IMM.  GRANS. 0.0 K/UL   METABOLIC PANEL, BASIC    Collection Time: 01/08/21 10:13 PM   Result Value Ref Range    Sodium 136 135 - 145 mmol/L    Potassium 4.0 3.2 - 5.1 mmol/L    Chloride 95 94 - 111 mmol/L    CO2 30 21 - 33 mmol/L    Anion gap 11 mmol/L    Glucose 84 70 - 110 mg/dL    BUN 19 9 - 21 mg/dL    Creatinine 4.20 (H) 0.70 - 1.20 mg/dL    BUN/Creatinine ratio 5      GFR est AA 13 ml/min/1.73m2    GFR est non-AA 11 ml/min/1.73m2    Calcium 7.1 (L) 8.5 - 10.5 mg/dL   TROPONIN I    Collection Time: 01/08/21 10:13 PM   Result Value Ref Range    Troponin-I, Qt. <0.02 (L) 0.02 - 0.05 ng/mL   HEPATIC FUNCTION PANEL    Collection Time: 01/08/21 10:13 PM   Result Value Ref Range    Protein, total 6.7 6.1 - 8.4 g/dL    Albumin 3.7 3.5 - 4.7 g/dL    Globulin 3.0 g/dL    A-G Ratio 1.2      Bilirubin, total 0.7 0.2 - 1.0 mg/dL    Bilirubin, direct 0.1 0.0 - 0.3 mg/dL    Alk. phosphatase 102 38 - 126 U/L    AST (SGOT) 35 14 - 74 U/L    ALT (SGPT) 24 3 - 52 U/L   LIPASE    Collection Time: 01/08/21 10:13 PM   Result Value Ref Range    Lipase 35 10 - 57 U/L   LACTIC ACID    Collection Time: 01/08/21 10:13 PM   Result Value Ref Range    Lactic acid 1.2 0.5 - 2.0 mmol/L   EKG, 12 LEAD, INITIAL    Collection Time: 01/08/21 11:03 PM   Result Value Ref Range    Ventricular Rate 75 BPM    Atrial Rate 84 BPM    P-R Interval 180 ms    QRS Duration 109 ms    Q-T Interval 480 ms    QTC Calculation (Bezet) 537 ms    Calculated P Axis 63 degrees    Calculated R Axis 62 degrees    Calculated T Axis 69 degrees    Diagnosis       Sinus rhythm  Atrial premature complexes  Nonspecific T abnrm, anterolateral leads  Prolonged QT interval  Baseline wander in lead(s) I,II,aVR,aVF         Radiologic Studies -   [unfilled]  CT Results  (Last 48 hours)               01/08/21 2251  CT ABD PELV WO CONT Final result    Impression:  IMPRESSION:           1. Mild bladder wall thickening. This is stable. Cannot exclude cystitis. 2. Right pelvic renal transplant with hyperdense and hypodense renal cysts   unchanged. No acute abnormality of the transplant kidney. 3. Cystic disease of the atrophic native kidneys stable. 4. Cholelithiasis without CT evidence of acute cholecystitis. Mildly prominent   extrahepatic biliary ductal system is stable.    5. No acute abnormality otherwise evident. Narrative:  EXAM: CT of the abdomen and pelvis       INDICATION: Right lower quadrant pain. COMPARISON: 12/28/2020       TECHNIQUE: Axial CT imaging of the abdomen and pelvis was performed without   intravenous contrast. Multiplanar reformats were generated. One or more dose   reduction techniques were used on this CT: automated exposure control,   adjustment of the mAs and/or kVp according to patient size, and iterative   reconstruction techniques. The specific techniques used on this CT exam have   been documented in the patient's electronic medical record. Digital Imaging and   Communications in Medicine (DICOM) format image data are available to   nonaffiliated external healthcare facilities or entities on a secure, media   free, reciprocally searchable basis with patient authorization for at least a   12-month period after this study. _______________       FINDINGS:       LOWER CHEST: Minimal scarring/atelectasis right middle lobe. PERITONEAL CAVITY: No free air or free fluid. LIVER, BILIARY: Liver is normal. No intrahepatic biliary ductal dilatation. Top   normal size distal extrahepatic biliary ductal system not changed. Cholelithiasis again noted. No gallbladder distention or wall thickening and no   pericholecystic fluid. PANCREAS: Normal.       SPLEEN: Accessory splenule's are noted adjacent to the tail of the pancreas. ADRENALS: Normal.       KIDNEYS: Native kidneys are atrophic. There is a renal cyst in the upper pole of   the left kidney and a small renal cyst upper pole of the right kidney that is   hypodense and a hyperdense renal cyst just caudal to this these are unchanged. Transplant kidney in the right side of the pelvis is noted with hypodense and   hyperdense renal cysts unchanged. No hydronephrosis or perinephric fluid   collection. LYMPH NODES: No enlarged lymph nodes.        GASTROINTESTINAL TRACT: No bowel dilation or wall thickening. The appendix is   normal. Diverticulosis is noted without diverticulitis. PELVIC ORGANS: The bladder base is somewhat low. Bladder wall mildly thick. No   pericystic inflammatory change. The bladder is unchanged. VASCULATURE: Mild atherosclerotic calcifications. BONES: No acute or aggressive osseous abnormalities identified. OTHER: None.       _______________               CXR Results  (Last 48 hours)    None          Medical Decision Making and ED Course   - I am the first and primary provider for this patient AND AM THE PRIMARY PROVIDER OF RECORD. - I reviewed the vital signs, available nursing notes, past medical history, past surgical history, family history and social history. - Initial assessment performed. The patients presenting problems have been discussed, and the staff are in agreement with the care plan formulated and outlined with them. I have encouraged them to ask questions as they arise throughout their visit. Vital Signs-Reviewed the patient's vital signs. Patient Vitals for the past 12 hrs:   Temp Pulse Resp BP SpO2   01/09/21 0049  75 20 135/64 100 %   01/09/21 0034  73 20 (!) 141/72 99 %   01/09/21 0019  73 20 (!) 142/65 98 %   01/09/21 0004  74 20 (!) 159/78 97 %   01/08/21 2349  75 20 (!) 161/77 96 %   01/08/21 2335  76 20 (!) 168/72 95 %   01/08/21 2334  76 20 (!) 168/72 95 %   01/08/21 2154     100 %   01/08/21 2146 98.7 °F (37.1 °C) 83 24 (!) 149/76 100 %       EKG interpretation: (Preliminary): Performed at 1103  Rhythm: normal sinus rhythm; and regular . Rate (approx.): 75; Axis: normal; MA interval: normal; QRS interval: normal ; ST/T wave: non-specific changes; Other findings: Flat Ts in V1/2. QTc: 537. Records Reviewed: Nursing Notes    ED Course:              Provider Notes (Medical Decision Making):   Recurrence of RLQ abd pain for which she was admitted 12/28/20.  Labs do not show any significant abnormalities such as appendicitis; diverticulitis. Possible mesenteric ischemia but not related to eating although may be related to fluid withdrawal during dialysis. UTI; renal transplant related pain    Consultations:         Procedures and Critical Care       Disposition     Disposition: Discharge    Discharged    Remove if not discharged  DISCHARGE PLAN:  1. Current Discharge Medication List      CONTINUE these medications which have NOT CHANGED    Details   warfarin (COUMADIN) 2 mg tablet Take 3 Tabs by mouth daily. Indications: blood clot in a deep vein of the extremities  Qty: 30 Tab, Refills: 0      Dexilant 60 mg CpDB capsule (delayed release) TAKE ONE CAPSULE BY MOUTH EVERY DAY  Qty: 30 Cap, Refills: 5      sucralfate (CARAFATE) 1 gram tablet TAKE 1 TABLET BY MOUTH FOUR TIMES DAILY  Qty: 360 Tab, Refills: 2    Comments: **Patient requests 90 days supply**      dicyclomine (BENTYL) 10 mg capsule TAKE 1 CAPSULE BY MOUTH FOUR TIMES DAILY  Qty: 120 Cap, Refills: 1      ondansetron (ZOFRAN ODT) 8 mg disintegrating tablet 1 p.o. every 6 to 8 hours for nausea and vomiting this does not need to be a tablet that orally dissolves. Qty: 8 Tab, Refills: 2    Associated Diagnoses: Calculus of gallbladder with acute cholecystitis without obstruction      amLODIPine (NORVASC) 10 mg tablet TAKE 1 TABLET BY MOUTH EVERY DAY  Qty: 90 Tab, Refills: 3      omeprazole (PRILOSEC) 20 mg capsule TAKE ONE CAPSULE BY MOUTH TWICE DAILY  Qty: 180 Cap, Refills: 2      metoprolol tartrate (LOPRESSOR) 50 mg tablet Take 50 mg by mouth daily. aluminum & magnesium hydroxide-simethicone (Maalox Maximum Strength) 400-400-40 mg/5 mL suspension Take 10 mL by mouth every six (6) hours as needed for Indigestion. Qty: 250 mL, Refills: 0      simvastatin (ZOCOR) 20 mg tablet TAKE 1 TABLET BY MOUTH DAILY AS DIRECTED. Qty: 90 Tab, Refills: 1      amiodarone (CORDARONE) 200 mg tablet Take 200 mg by mouth daily.       ESTRACE 0.01 % (0.1 mg/gram) vaginal cream INSERT 2 GRAMS INTO VAGINA EVERY MONDAY AND THURSDAY  Qty: 42.5 g, Refills: 5      cranberry extract 425 mg cap 425 mg. LORazepam (ATIVAN) 0.5 mg tablet 0.5 mg.      fluticasone (FLONASE) 50 mcg/actuation nasal spray 1 Spray. EPINEPHrine (EPIPEN) 0.3 mg/0.3 mL injection 0.3 mg.      hyoscyamine SL (LEVSIN/SL) 0.125 mg SL tablet 0.125 mg by SubLINGual route every four (4) hours as needed. albuterol (PROVENTIL VENTOLIN) 2.5 mg /3 mL (0.083 %) nebulizer solution by Nebulization route as needed. fluticasone-salmeterol (ADVAIR DISKUS) 250-50 mcg/dose diskus inhaler Take 1 Puff by inhalation every twelve (12) hours. montelukast (SINGULAIR) 10 mg tablet Take 10 mg by mouth daily. 2.   Follow-up Information     Follow up With Specialties Details Why Contact Info    Sudha Ashton MD Family Medicine Schedule an appointment as soon as possible for a visit in 3 days  [address]      David Guevara MD Nephrology Schedule an appointment as soon as possible for a visit in 3 days  [address] [place] [address]  [facility] [address] [place] [address]  [phone]          3. Return to ED if worse   4. Current Discharge Medication List          Diagnosis     Clinical Impression:   1. Abdominal pain, right lower quadrant    2. QT prolongation        Attestations:    Joe Mahoney MD    Please note that this dictation was completed with [employer], the [employer] voice recognition software. Quite often unanticipated grammatical, syntax, homophones, and other interpretive errors are inadvertently transcribed by the computer software. Please disregard these errors. Please excuse any errors that have escaped final proofreading. Thank you.

## 2021-01-09 NOTE — ED TRIAGE NOTES
Patient arrived by EMS with abdominal pain since 3 pm after her dialysis treatment today. Patient has known gall stones but surgery delayed due to covid virus .

## 2021-01-09 NOTE — ED NOTES
I have reviewed discharge instructions with the patient. The patient verbalized understanding. Patient escorted out to personal vehicle via wheelchair.

## 2021-01-10 ENCOUNTER — TELEPHONE (OUTPATIENT)
Dept: FAMILY MEDICINE CLINIC | Age: 66
End: 2021-01-10

## 2021-01-10 LAB
ATRIAL RATE: 84 BPM
CALCULATED P AXIS, ECG09: 63 DEGREES
CALCULATED R AXIS, ECG10: 62 DEGREES
CALCULATED T AXIS, ECG11: 69 DEGREES
DIAGNOSIS, 93000: NORMAL
P-R INTERVAL, ECG05: 180 MS
Q-T INTERVAL, ECG07: 480 MS
QRS DURATION, ECG06: 109 MS
QTC CALCULATION (BEZET), ECG08: 537 MS
VENTRICULAR RATE, ECG03: 75 BPM

## 2021-01-11 LAB
BACTERIA SPEC CULT: ABNORMAL
COLONY COUNT,CNT: ABNORMAL
COLONY COUNT,CNT: ABNORMAL
SPECIAL REQUESTS,SREQ: ABNORMAL

## 2021-01-13 RX ORDER — AMIODARONE HYDROCHLORIDE 200 MG/1
TABLET ORAL
Qty: 90 TAB | Refills: 2 | Status: SHIPPED | OUTPATIENT
Start: 2021-01-13 | End: 2021-09-18

## 2021-01-15 ENCOUNTER — TELEPHONE (OUTPATIENT)
Dept: FAMILY MEDICINE CLINIC | Age: 66
End: 2021-01-15

## 2021-02-08 RX ORDER — ALBUTEROL SULFATE 0.83 MG/ML
SOLUTION RESPIRATORY (INHALATION)
Qty: 90 ML | Refills: 3 | Status: SHIPPED | OUTPATIENT
Start: 2021-02-08 | End: 2021-08-24 | Stop reason: SDUPTHER

## 2021-02-08 NOTE — TELEPHONE ENCOUNTER
Patient needs a refill on sucralfate (CARAFATE) 1 gram tablet, Sertraline 50 mg and Carvedilol 6.25 mg sent to [facility]

## 2021-02-09 RX ORDER — SERTRALINE HYDROCHLORIDE 50 MG/1
1 TABLET, FILM COATED ORAL DAILY
COMMUNITY
End: 2021-02-09 | Stop reason: SDUPTHER

## 2021-02-09 RX ORDER — SUCRALFATE 1 G/1
1 TABLET ORAL 4 TIMES DAILY
COMMUNITY
End: 2021-02-09 | Stop reason: SDUPTHER

## 2021-02-10 RX ORDER — SUCRALFATE 1 G/1
1 TABLET ORAL 4 TIMES DAILY
Qty: 360 TAB | Refills: 0 | Status: SHIPPED | OUTPATIENT
Start: 2021-02-10 | End: 2021-03-28

## 2021-02-10 RX ORDER — SERTRALINE HYDROCHLORIDE 50 MG/1
50 TABLET, FILM COATED ORAL DAILY
Qty: 90 TAB | Refills: 0 | Status: ON HOLD | OUTPATIENT
Start: 2021-02-10 | End: 2021-12-24

## 2021-02-16 DIAGNOSIS — R42 DIZZINESS: ICD-10-CM

## 2021-02-17 RX ORDER — MECLIZINE HYDROCHLORIDE 25 MG/1
TABLET ORAL
Qty: 21 TAB | Refills: 0 | Status: SHIPPED | OUTPATIENT
Start: 2021-02-17 | End: 2021-04-27

## 2021-02-19 RX ORDER — SIMVASTATIN 20 MG/1
TABLET, FILM COATED ORAL
Qty: 90 TAB | Refills: 1 | Status: SHIPPED | OUTPATIENT
Start: 2021-02-19 | End: 2022-07-27 | Stop reason: SDUPTHER

## 2021-03-02 ENCOUNTER — VIRTUAL VISIT (OUTPATIENT)
Dept: FAMILY MEDICINE CLINIC | Age: 66
End: 2021-03-02
Payer: MEDICARE

## 2021-03-02 DIAGNOSIS — R31.9 URINARY TRACT INFECTION WITH HEMATURIA, SITE UNSPECIFIED: ICD-10-CM

## 2021-03-02 DIAGNOSIS — J30.1 SEASONAL ALLERGIC RHINITIS DUE TO POLLEN: ICD-10-CM

## 2021-03-02 DIAGNOSIS — K12.1 STOMATITIS: ICD-10-CM

## 2021-03-02 DIAGNOSIS — Z99.2 ESRD (END STAGE RENAL DISEASE) ON DIALYSIS (HCC): Primary | ICD-10-CM

## 2021-03-02 DIAGNOSIS — N39.0 URINARY TRACT INFECTION WITH HEMATURIA, SITE UNSPECIFIED: ICD-10-CM

## 2021-03-02 DIAGNOSIS — I10 HYPERTENSION, UNSPECIFIED TYPE: ICD-10-CM

## 2021-03-02 DIAGNOSIS — E03.9 HYPOTHYROIDISM, UNSPECIFIED TYPE: ICD-10-CM

## 2021-03-02 DIAGNOSIS — K21.9 GASTROESOPHAGEAL REFLUX DISEASE WITHOUT ESOPHAGITIS: ICD-10-CM

## 2021-03-02 DIAGNOSIS — N18.6 ESRD (END STAGE RENAL DISEASE) ON DIALYSIS (HCC): Primary | ICD-10-CM

## 2021-03-02 PROBLEM — B37.0 THRUSH OF MOUTH AND ESOPHAGUS (HCC): Status: ACTIVE | Noted: 2021-03-02

## 2021-03-02 PROBLEM — B37.81 THRUSH OF MOUTH AND ESOPHAGUS (HCC): Status: ACTIVE | Noted: 2021-03-02

## 2021-03-02 PROCEDURE — 99213 OFFICE O/P EST LOW 20 MIN: CPT | Performed by: FAMILY MEDICINE

## 2021-03-02 PROCEDURE — 3017F COLORECTAL CA SCREEN DOC REV: CPT | Performed by: FAMILY MEDICINE

## 2021-03-02 PROCEDURE — G8510 SCR DEP NEG, NO PLAN REQD: HCPCS | Performed by: FAMILY MEDICINE

## 2021-03-02 PROCEDURE — G8427 DOCREV CUR MEDS BY ELIG CLIN: HCPCS | Performed by: FAMILY MEDICINE

## 2021-03-02 PROCEDURE — G9231 DOC ESRD DIA TRANS PREG: HCPCS | Performed by: FAMILY MEDICINE

## 2021-03-02 PROCEDURE — G8419 CALC BMI OUT NRM PARAM NOF/U: HCPCS | Performed by: FAMILY MEDICINE

## 2021-03-02 PROCEDURE — G9899 SCRN MAM PERF RSLTS DOC: HCPCS | Performed by: FAMILY MEDICINE

## 2021-03-02 RX ORDER — CARVEDILOL 6.25 MG/1
6.25 TABLET ORAL 2 TIMES DAILY WITH MEALS
Qty: 180 TAB | Refills: 2 | Status: SHIPPED | OUTPATIENT
Start: 2021-03-02 | End: 2021-08-24 | Stop reason: SDUPTHER

## 2021-03-02 RX ORDER — FLUTICASONE PROPIONATE AND SALMETEROL 250; 50 UG/1; UG/1
1 POWDER RESPIRATORY (INHALATION) EVERY 12 HOURS
COMMUNITY
End: 2021-06-17 | Stop reason: SDUPTHER

## 2021-03-02 RX ORDER — VALGANCICLOVIR 450 MG/1
450 TABLET, FILM COATED ORAL DAILY
COMMUNITY
End: 2021-06-17 | Stop reason: SDUPTHER

## 2021-03-02 RX ORDER — CARVEDILOL 6.25 MG/1
6.25 TABLET ORAL 2 TIMES DAILY WITH MEALS
COMMUNITY
Start: 2020-09-24 | End: 2021-06-17 | Stop reason: SDUPTHER

## 2021-03-02 RX ORDER — FLUCONAZOLE 150 MG/1
150 TABLET ORAL DAILY
Qty: 1 TAB | Refills: 0 | Status: SHIPPED | OUTPATIENT
Start: 2021-03-02 | End: 2021-03-03

## 2021-03-02 RX ORDER — FLUTICASONE PROPIONATE AND SALMETEROL 250; 50 UG/1; UG/1
1 POWDER RESPIRATORY (INHALATION) EVERY 12 HOURS
Qty: 1 INHALER | Refills: 3 | Status: SHIPPED | OUTPATIENT
Start: 2021-03-02 | End: 2021-07-03

## 2021-03-02 RX ORDER — VALGANCICLOVIR 450 MG/1
900 TABLET, FILM COATED ORAL DAILY
Qty: 90 TAB | Refills: 5 | Status: SHIPPED | OUTPATIENT
Start: 2021-03-02 | End: 2021-09-18

## 2021-03-02 NOTE — PROGRESS NOTES
Siva Montalvo is a 63 y.o. female, evaluated via audio-only technology on 3/2/2021 for Abdominal Pain (thinks she has a uti, just had gallbladder removed 2wks ago)  . Assessment & Plan: Stage renal disease, GERD, hypertension, urinary tract infection, stomatitis probably thrush, hypothyroidism this was a 30-minute visit with telephone to telephone interaction. It sounds like she is having thrush and she thinks she sees white things in her mouth I am going to use Diflucan. She thinks she has a urinary tract infection I am going to order a culture she has not had her thyroid checked recently a TSH a CMP and a CBC will be ordered as well she had not been checking her blood pressure and I have asked her to do that regularly. She has chronic reflux disease she has allergies as well but has been fairly stable. She has had no vomiting or diarrhea. I am going to check lab work a culture a thyroid study I am covering her with an antibiotic until I can get a culture and I am also going to get a CMP. Was in my office the patient was at her home       12  Subjective: Patient is a 81-year-old female who is seen for evaluation today. She is seen by telephone to telephone interaction she has a multitude of complaints. She has had soreness in her mouth and reports that we have given her medicine for that in the past she has had no vomiting or diarrhea. She has had some fatigue she is feeling like she is having another urine infection her thyroid has not been rechecked lately. She wonders whether or not she should take the Covid vaccine and her nephrology people of suggested she should she has had no chest pain or shortness of breath no rash no syncope no loss of consciousness. Her bowel movements have been appropriate. Nobody at home has been sick she eats relatively well she does not eat a controlled diet she insists her blood sugars have been less than 150. No falls or injuries.        Prior to Admission medications    Medication Sig Start Date End Date Taking? Authorizing Provider   carvediloL (COREG) 6.25 mg tablet Take 6.25 mg by mouth two (2) times daily (with meals). 9/24/20  Yes Provider, Historical   fluticasone propion-salmeteroL (Advair Diskus) 250-50 mcg/dose diskus inhaler Take 1 Puff by inhalation every twelve (12) hours. Yes Provider, Historical   valGANciclovir (VALCYTE) 450 mg tablet Take 450 mg by mouth daily. Yes Provider, Historical   simvastatin (ZOCOR) 20 mg tablet TAKE 1 TABLET BY MOUTH DAILY AS DIRECTED. 2/19/21  Yes Desiree Cosby MD   meclizine (ANTIVERT) 25 mg tablet TAKE 1 TABLET BY MOUTH THREE TIMES DAILY FOR UP TO 10 DAYS AS NEEDED FOR DIZZINESS 2/17/21  Yes Desiree Cosby MD   sucralfate (CARAFATE) 1 gram tablet Take 1 Tab by mouth four (4) times daily. 2/10/21  Yes Desiree Cosby MD   sertraline (ZOLOFT) 50 mg tablet Take 1 Tab by mouth daily. 2/10/21  Yes Desiree Cosby MD   albuterol (PROVENTIL VENTOLIN) 2.5 mg /3 mL (0.083 %) nebu USE 1 VIAL VIA NEBULIZER THREE TIMES DAILY 2/8/21  Yes Desiree Cosby MD   amiodarone (CORDARONE) 200 mg tablet TAKE 1 TABLET BY MOUTH EVERY DAY 1/13/21  Yes Desiree Cosby MD   warfarin (COUMADIN) 2 mg tablet Take 3 Tabs by mouth daily. Indications: blood clot in a deep vein of the extremities 12/29/20  Yes Sasha Sanchez NP   Dexilant 60 mg CpDB capsule (delayed release) TAKE ONE CAPSULE BY MOUTH EVERY DAY 12/22/20  Yes Desiree Cosby MD   sucralfate (CARAFATE) 1 gram tablet TAKE 1 TABLET BY MOUTH FOUR TIMES DAILY 11/3/20  Yes Desiree Cosby MD   dicyclomine (BENTYL) 10 mg capsule TAKE 1 CAPSULE BY MOUTH FOUR TIMES DAILY 10/27/20  Yes Desiree Cosby MD   ondansetron (ZOFRAN ODT) 8 mg disintegrating tablet 1 p.o. every 6 to 8 hours for nausea and vomiting this does not need to be a tablet that orally dissolves.  10/9/20  Yes Desiree Cosby MD   amLODIPine (NORVASC) 10 mg tablet TAKE 1 TABLET BY MOUTH EVERY DAY 9/22/20  Yes Desiree Cosby MD omeprazole (PRILOSEC) 20 mg capsule TAKE ONE CAPSULE BY MOUTH TWICE DAILY 9/22/20  Yes Velma Gowers, MD   metoprolol tartrate (LOPRESSOR) 50 mg tablet Take 50 mg by mouth daily. 2/22/19  Yes Other, MD Abelardo   aluminum & magnesium hydroxide-simethicone (Maalox Maximum Strength) 400-400-40 mg/5 mL suspension Take 10 mL by mouth every six (6) hours as needed for Indigestion. 9/17/20  Yes Gladis Capone MD   ESTRACE 0.01 % (0.1 mg/gram) vaginal cream INSERT 2 GRAMS INTO VAGINA EVERY MONDAY AND THURSDAY 4/11/17  Yes nAne Henderson MD   cranberry extract 425 mg cap 425 mg. 1/17/14  Yes Provider, Historical   LORazepam (ATIVAN) 0.5 mg tablet 0.5 mg.   Yes Provider, Historical   fluticasone (FLONASE) 50 mcg/actuation nasal spray 1 Spray. Yes Provider, Historical   EPINEPHrine (EPIPEN) 0.3 mg/0.3 mL injection 0.3 mg. 10/1/16  Yes Provider, Historical   hyoscyamine SL (LEVSIN/SL) 0.125 mg SL tablet 0.125 mg by SubLINGual route every four (4) hours as needed. Yes Provider, Historical   fluticasone-salmeterol (ADVAIR DISKUS) 250-50 mcg/dose diskus inhaler Take 1 Puff by inhalation every twelve (12) hours. Yes Provider, Historical   montelukast (SINGULAIR) 10 mg tablet Take 10 mg by mouth daily. Yes Provider, Historical     Patient Active Problem List   Diagnosis Code    History of kidney transplant Z94.0    CMV (cytomegalovirus) antibody positive R89.4    Hematuria R31.9    Fecal incontinence R15.9    Drug-induced hyperglycemia R73.9, T50.905A    Diverticulitis of intestine K57.92    Anemia D64.9    Acute renal failure ([facility] [address] N17.9    Asthma J45.909    Exacerbation of asthma J45. 0    Chronic kidney disease, stage III (moderate) N18.30    Chronic obstructive pulmonary disease (HCC) J44.9    Cystic disease of kidney Q61.9    Diverticular disease of large intestine K57.30    Essential hypertension I10    Fever R50.9    Gastroenteritis K52.9    Gastroesophageal reflux disease K21.9  Seasonal allergic rhinitis due to pollen J30.1    Hyperlipidemia E78.5    Hyperkalemia E87.5    Disease due to gram-negative bacillus B96.89    Kidney transplant rejection T86.11    Migraine without status migrainosus, not intractable G43.909    Nausea and vomiting R11.2    Adiposity E66.9    Pruritus L29.9    Recurrent urinary tract infection N39.0    UTI (urinary tract infection) N39.0    Renal transplant disorder T86.10    Systemic infection ([facility]) A41.9    Systemic inflammatory response syndrome (SIRS) ([facility]) R65.10    Ulcer [id number]    Renal cyst N28.1    Obesity E66.9    Migraine G43.909    Hypertension I10    Hypercholesteremia E78.00    GERD (gastroesophageal reflux disease) K21.9    Burning with urination R30.0    Arrhythmia I49.9    Acute recurrent maxillary sinusitis J01.01    Calculus of gallbladder with acute cholecystitis without obstruction K80.00    Hypothyroidism E03.9    Vertigo R42    ESRD (end stage renal disease) on dialysis ([facility]) N18.6, Z99.2    History of DVT (deep vein thrombosis) Z86.718    Stomatitis K12.1    Thrush of mouth and esophagus ([facility]) B37.81, B37.0       Review of Systems   Constitutional: Positive for malaise/fatigue. HENT: Positive for sore throat. Eyes: Negative. Respiratory: Negative. Cardiovascular: Negative. Gastrointestinal: Negative. Musculoskeletal: Negative. Skin: Negative. Neurological: Negative. Endo/Heme/Allergies: Negative. Psychiatric/Behavioral: Negative. Patient-Reported Vitals 8/6/2020   Patient-Reported Weight 150   Patient-Reported Height 7'1\"        Tracy Barrios, who was evaluated through a patient-initiated, synchronous (real-time) audio only encounter, and/or her healthcare decision maker, is aware that it is a billable service, with coverage as determined by her insurance carrier. She provided verbal consent to proceed: n/a- consent obtained within past 12 months.  She has not had a related appointment within my department in the past 7 days or scheduled within the next 24 hours.       Total Time: minutes: 21-30 minutes    Heather Rush MD

## 2021-03-02 NOTE — PROGRESS NOTES
Tracy Barrios presents today for   Chief Complaint   Patient presents with    Abdominal Pain     thinks she has a uti, just had gallbladder removed 2wks ago       Virtual/telephone visit    Depression Screening:  3 most recent PHQ Screens 3/2/2021   Little interest or pleasure in doing things Not at all   Feeling down, depressed, irritable, or hopeless Not at all   Total Score PHQ 2 0       Learning Assessment:  Learning Assessment 10/9/2020   PRIMARY LEARNER Patient   HIGHEST LEVEL OF EDUCATION - PRIMARY LEARNER  DID NOT GRADUATE HIGH SCHOOL   BARRIERS PRIMARY LEARNER NONE   PRIMARY LANGUAGE ENGLISH   LEARNER PREFERENCE PRIMARY READING   ANSWERED BY patient   RELATIONSHIP SELF       Fall Risk  Fall Risk Assessment, last 12 mths 12/24/2020   Able to walk? Yes   Fall in past 12 months? 0   Do you feel unsteady? 0   Are you worried about falling 0   Number of falls in past 12 months -   Fall with injury? -       ADL  ADL Assessment 12/24/2020   Feeding yourself No Help Needed   Getting from bed to chair No Help Needed   Getting dressed No Help Needed   Bathing or showering No Help Needed   Walk across the room (includes cane/walker) No Help Needed   Using the telphone No Help Needed   Taking your medications No Help Needed   Preparing meals No Help Needed   Managing money (expenses/bills) No Help Needed   Moderately strenuous housework (laundry) No Help Needed   Shopping for personal items (toiletries/medicines) No Help Needed   Shopping for groceries No Help Needed   Driving No Help Needed   Climbing a flight of stairs No Help Needed   Getting to places beyond walking distances No Help Needed       Health Maintenance reviewed and discussed and ordered per Provider.     Health Maintenance Due   Topic Date Due    Hepatitis C Screening  Never done    COVID-19 Vaccine (1 of 2) Never done    DTaP/Tdap/Td series (1 - Tdap) Never done    PAP AKA CERVICAL CYTOLOGY  Never done    Shingrix Vaccine Age 50> (1 of 2) Never done    Pneumococcal 0-64 years (3 of 3 - PCV13) 11/13/2017    Medicare Yearly Exam  Never done   . Coordination of Care:  1. Have you been to the ER, urgent care clinic since your last visit? Hospitalized since your last visit? Yes, gallbladder removal surgery     2. Have you seen or consulted any other health care providers outside of the [address] [place] [address] since your last visit? Include any pap smears or colon screening.    No

## 2021-03-04 ENCOUNTER — HOSPITAL ENCOUNTER (OUTPATIENT)
Dept: LAB | Age: 66
Discharge: HOME | End: 2021-03-04
Payer: MEDICARE

## 2021-03-04 LAB
ALBUMIN SERPL-MCNC: 4 G/DL (ref 3.5–4.7)
ALBUMIN/GLOB SERPL: 1.3 {RATIO}
ALP SERPL-CCNC: 62 U/L (ref 38–126)
ALT SERPL-CCNC: 9 U/L (ref 3–52)
ANION GAP SERPL CALC-SCNC: 14 MMOL/L
AST SERPL W P-5'-P-CCNC: 13 U/L (ref 14–74)
BILIRUB SERPL-MCNC: 0.3 MG/DL (ref 0.2–1)
BUN SERPL-MCNC: 63 MG/DL (ref 9–21)
BUN/CREAT SERPL: 7
CA-I BLD-MCNC: 8.1 MG/DL (ref 8.5–10.5)
CHLORIDE SERPL-SCNC: 105 MMOL/L (ref 94–111)
CO2 SERPL-SCNC: 21 MMOL/L (ref 21–33)
CREAT SERPL-MCNC: 8.5 MG/DL (ref 0.7–1.2)
ERYTHROCYTE [DISTWIDTH] IN BLOOD BY AUTOMATED COUNT: 15.3 % (ref 11.6–14.5)
GLOBULIN SER CALC-MCNC: 3.2 G/DL
GLUCOSE SERPL-MCNC: 70 MG/DL (ref 70–110)
HCT VFR BLD AUTO: 34.3 % (ref 35–45)
HGB BLD-MCNC: 10.8 G/DL (ref 12–16)
MCH RBC QN AUTO: 33.2 PG (ref 24–34)
MCHC RBC AUTO-ENTMCNC: 31.5 G/DL (ref 31–37)
MCV RBC AUTO: 105.5 FL (ref 74–97)
PLATELET # BLD AUTO: 278 K/UL (ref 135–420)
PMV BLD AUTO: 9.6 FL (ref 9.2–11.8)
POTASSIUM SERPL-SCNC: 4.6 MMOL/L (ref 3.2–5.1)
PROT SERPL-MCNC: 7.2 G/DL (ref 6.1–8.4)
RBC # BLD AUTO: 3.25 M/UL (ref 4.2–5.3)
SODIUM SERPL-SCNC: 140 MMOL/L (ref 135–145)
WBC # BLD AUTO: 6.6 K/UL (ref 4.6–13.2)

## 2021-03-04 PROCEDURE — 85027 COMPLETE CBC AUTOMATED: CPT

## 2021-03-04 PROCEDURE — 87086 URINE CULTURE/COLONY COUNT: CPT

## 2021-03-04 PROCEDURE — 80053 COMPREHEN METABOLIC PANEL: CPT

## 2021-03-04 PROCEDURE — 36415 COLL VENOUS BLD VENIPUNCTURE: CPT

## 2021-03-05 LAB
BACTERIA SPEC CULT: NORMAL
COLONY COUNT,CNT: NORMAL
SPECIAL REQUESTS,SREQ: NORMAL

## 2021-03-10 ENCOUNTER — TELEPHONE (OUTPATIENT)
Dept: FAMILY MEDICINE CLINIC | Age: 66
End: 2021-03-10

## 2021-03-28 RX ORDER — SUCRALFATE 1 G/1
TABLET ORAL
Qty: 360 TAB | Refills: 0 | Status: SHIPPED | OUTPATIENT
Start: 2021-03-28 | End: 2021-11-30

## 2021-04-15 RX ORDER — DICYCLOMINE HYDROCHLORIDE 10 MG/1
CAPSULE ORAL
Qty: 120 CAP | Refills: 1 | Status: SHIPPED | OUTPATIENT
Start: 2021-04-15 | End: 2022-02-02

## 2021-04-27 DIAGNOSIS — R42 DIZZINESS: ICD-10-CM

## 2021-04-27 RX ORDER — MECLIZINE HYDROCHLORIDE 25 MG/1
TABLET ORAL
Qty: 21 TAB | Refills: 0 | Status: SHIPPED | OUTPATIENT
Start: 2021-04-27 | End: 2021-08-24 | Stop reason: SDUPTHER

## 2021-04-30 ENCOUNTER — HOSPITAL ENCOUNTER (OUTPATIENT)
Dept: LAB | Age: 66
Discharge: HOME | End: 2021-04-30
Payer: MEDICARE

## 2021-04-30 ENCOUNTER — TRANSCRIBE ORDER (OUTPATIENT)
Dept: REGISTRATION | Age: 66
End: 2021-04-30

## 2021-04-30 DIAGNOSIS — I48.11 LONGSTANDING PERSISTENT ATRIAL FIBRILLATION (HCC): Primary | ICD-10-CM

## 2021-04-30 DIAGNOSIS — I48.11 LONGSTANDING PERSISTENT ATRIAL FIBRILLATION (HCC): ICD-10-CM

## 2021-04-30 LAB
ALBUMIN SERPL-MCNC: 4.1 G/DL (ref 3.5–4.7)
ALBUMIN/GLOB SERPL: 1.3 {RATIO}
ALP SERPL-CCNC: 64 U/L (ref 38–126)
ALT SERPL-CCNC: 12 U/L (ref 3–52)
AST SERPL W P-5'-P-CCNC: 18 U/L (ref 14–74)
BILIRUB DIRECT SERPL-MCNC: 0.1 MG/DL (ref 0–0.3)
BILIRUB SERPL-MCNC: 1.1 MG/DL (ref 0.2–1)
GLOBULIN SER CALC-MCNC: 3.1 G/DL
INR PPP: 1.3 (ref 0.8–1.2)
PROT SERPL-MCNC: 7.2 G/DL (ref 6.1–8.4)
PROTHROMBIN TIME: 15.1 SEC (ref 11.5–15.2)

## 2021-04-30 PROCEDURE — 80076 HEPATIC FUNCTION PANEL: CPT

## 2021-04-30 PROCEDURE — 84439 ASSAY OF FREE THYROXINE: CPT

## 2021-04-30 PROCEDURE — 86376 MICROSOMAL ANTIBODY EACH: CPT

## 2021-04-30 PROCEDURE — 36415 COLL VENOUS BLD VENIPUNCTURE: CPT

## 2021-04-30 PROCEDURE — 85610 PROTHROMBIN TIME: CPT

## 2021-04-30 PROCEDURE — 84443 ASSAY THYROID STIM HORMONE: CPT

## 2021-05-01 LAB
INTERPRETIVE COMMENT, 330017: NORMAL
T4 FREE SERPL-MCNC: 1.02 NG/DL (ref 0.82–1.77)
THYROID PEROXIDASE (TPO) AB, 006677: 10 IU/ML (ref 0–34)
TSH SERPL-ACNC: 5.75 UIU/ML (ref 0.45–4.5)

## 2021-05-06 ENCOUNTER — TELEPHONE (OUTPATIENT)
Dept: FAMILY MEDICINE CLINIC | Age: 66
End: 2021-05-06

## 2021-05-06 ENCOUNTER — VIRTUAL VISIT (OUTPATIENT)
Dept: FAMILY MEDICINE CLINIC | Age: 66
End: 2021-05-06
Payer: MEDICARE

## 2021-05-06 DIAGNOSIS — Z79.01 CHRONIC ANTICOAGULATION: ICD-10-CM

## 2021-05-06 DIAGNOSIS — Z94.0 HISTORY OF KIDNEY TRANSPLANT: ICD-10-CM

## 2021-05-06 DIAGNOSIS — Z86.718 HISTORY OF DVT (DEEP VEIN THROMBOSIS): Primary | ICD-10-CM

## 2021-05-06 DIAGNOSIS — I48.91 ATRIAL FIBRILLATION, UNSPECIFIED TYPE (HCC): ICD-10-CM

## 2021-05-06 DIAGNOSIS — Z76.89 ENCOUNTER FOR CHOLECYSTECTOMY: ICD-10-CM

## 2021-05-06 DIAGNOSIS — K21.00 GASTROESOPHAGEAL REFLUX DISEASE WITH ESOPHAGITIS WITHOUT HEMORRHAGE: ICD-10-CM

## 2021-05-06 DIAGNOSIS — K21.9 GASTROESOPHAGEAL REFLUX DISEASE WITHOUT ESOPHAGITIS: Primary | ICD-10-CM

## 2021-05-06 DIAGNOSIS — M54.50 ACUTE LEFT-SIDED LOW BACK PAIN WITHOUT SCIATICA: ICD-10-CM

## 2021-05-06 PROCEDURE — 99443 PR PHYS/QHP TELEPHONE EVALUATION 21-30 MIN: CPT | Performed by: FAMILY MEDICINE

## 2021-05-06 RX ORDER — RANITIDINE 300 MG/1
300 CAPSULE ORAL
Qty: 30 CAP | Refills: 1 | Status: SHIPPED | OUTPATIENT
Start: 2021-05-06 | End: 2021-07-14

## 2021-05-06 RX ORDER — FAMOTIDINE 20 MG/1
20 TABLET, FILM COATED ORAL 2 TIMES DAILY
Qty: 30 TAB | Refills: 1 | Status: SHIPPED | OUTPATIENT
Start: 2021-05-06 | End: 2021-07-14

## 2021-05-06 NOTE — PROGRESS NOTES
Isis Garcia presents today for   Chief Complaint   Patient presents with    Abdominal Pain     when she woke up she was hurting really bad        Virtual/telephone visit    Depression Screening:  3 most recent PHQ Screens 5/6/2021   Little interest or pleasure in doing things Several days   Feeling down, depressed, irritable, or hopeless Several days   Total Score PHQ 2 2       Learning Assessment:  Learning Assessment 10/9/2020   PRIMARY LEARNER Patient   HIGHEST LEVEL OF EDUCATION - PRIMARY LEARNER  DID NOT GRADUATE HIGH SCHOOL   BARRIERS PRIMARY LEARNER NONE   PRIMARY LANGUAGE ENGLISH   LEARNER PREFERENCE PRIMARY READING   ANSWERED BY patient   RELATIONSHIP SELF       Fall Risk  Fall Risk Assessment, last 12 mths 12/24/2020   Able to walk? Yes   Fall in past 12 months? 0   Do you feel unsteady? 0   Are you worried about falling 0   Number of falls in past 12 months -   Fall with injury? -       ADL  ADL Assessment 12/24/2020   Feeding yourself No Help Needed   Getting from bed to chair No Help Needed   Getting dressed No Help Needed   Bathing or showering No Help Needed   Walk across the room (includes cane/walker) No Help Needed   Using the telphone No Help Needed   Taking your medications No Help Needed   Preparing meals No Help Needed   Managing money (expenses/bills) No Help Needed   Moderately strenuous housework (laundry) No Help Needed   Shopping for personal items (toiletries/medicines) No Help Needed   Shopping for groceries No Help Needed   Driving No Help Needed   Climbing a flight of stairs No Help Needed   Getting to places beyond walking distances No Help Needed       Travel Screening:    Travel Screening     Question   Response    In the last month, have you been in contact with someone who was confirmed or suspected to have Coronavirus / COVID-19? No / Unsure    Have you had a COVID-19 viral test in the last 14 days? No    Do you have any of the following new or worsening symptoms?   None of these    Have you traveled internationally or domestically in the last month? No      Travel History   Travel since 04/06/21     No documented travel since 04/06/21          Health Maintenance reviewed and discussed and ordered per Provider. Health Maintenance Due   Topic Date Due    Hepatitis C Screening  Never done    COVID-19 Vaccine (1) Never done    DTaP/Tdap/Td series (1 - Tdap) Never done    PAP AKA CERVICAL CYTOLOGY  Never done    Shingrix Vaccine Age 50> (1 of 2) Never done    Pneumococcal 0-64 years (3 of 3 - PCV13) 11/13/2017    Medicare Yearly Exam  Never done   . Coordination of Care:  1. Have you been to the ER, urgent care clinic since your last visit? Hospitalized since your last visit? no    2. Have you seen or consulted any other health care providers outside of the [address] [place] [address] since your last visit? Include any pap smears or colon screening.  yes

## 2021-05-06 NOTE — PROGRESS NOTES
Len Cheung is a 63 y.o. female, evaluated via audio-only technology on 2021 for Abdominal Pain (when she woke up she was hurting really bad )  . Epigastric pain with reflux disease, new onset atrial fibrillation with chronic anticoagulation, left-sided lower back pain, history of DVT, recent cholecystectomy, recurrent renal transplantation: Son  recently at an [facility] while in the waiting room he was having a heart attack. She is distressed about that. I think this is reflux disease but because of its intensity already on Dexilant I think she needs a gastroenterologist appointment we will try to set that up. I am going to add ranitidine 300 mg. I did not know she had atrial fibrillation that was a new diagnosis and she is going to need INRs on a regular basis and she cannot go to [place] to do that I have asked her to talk with them about getting her machine to do at home I think she is capable of doing that. This was a 25-minute visit with telephone to telephone interaction I was in my office the patient was at her home. 12  Subjective: Patient is a 57-year-old female who is seen today for evaluation. She is seen with telephone to telephone interaction. Her son  relatively recently anxious about that but not suicidal or homicidal she had a gallbladder removed 2 months ago which I was not aware of and she has a new diagnosis of atrial fibrillation and is on Coumadin therapy wants her to come to [place] every week to have her INR and I told her that was not possible with her traveling she is agreeable to that working to try to get those done locally and I talked her about talking with her cardiologist about getting a machine where she could check those at home she has had no chest congestion or cough no rash no syncope no loss of consciousness. Bowel movements have been appropriate. Eating relatively well.   Nobody at home has been sick no chest pain no shortness of breath she has discomfort after eating she is on Dexilant. She denies being suicidal or homicidal.  She is trying to eat appropriately. Lives with family members. Prior to Admission medications    Medication Sig Start Date End Date Taking? Authorizing Provider   meclizine (ANTIVERT) 25 mg tablet TAKE 1 TABLET BY MOUTH THREE TIMES DAILY FOR UP TO 10 DAYS AS NEEDED FOR DIZZINESS 4/27/21  Yes Amol Hale MD   dicyclomine (BENTYL) 10 mg capsule TAKE 1 CAPSULE BY MOUTH FOUR TIMES DAILY 4/15/21  Yes Amol Hale MD   carvediloL (COREG) 6.25 mg tablet Take 6.25 mg by mouth two (2) times daily (with meals). 9/24/20  Yes Provider, Historical   fluticasone propion-salmeteroL (Advair Diskus) 250-50 mcg/dose diskus inhaler Take 1 Puff by inhalation every twelve (12) hours. Yes Provider, Historical   valGANciclovir (VALCYTE) 450 mg tablet Take 2 Tabs by mouth daily. 3/2/21  Yes Amol Hale MD   simvastatin (ZOCOR) 20 mg tablet TAKE 1 TABLET BY MOUTH DAILY AS DIRECTED. 2/19/21  Yes Amol Hale MD   sertraline (ZOLOFT) 50 mg tablet Take 1 Tab by mouth daily. 2/10/21  Yes Amol Hale MD   albuterol (PROVENTIL VENTOLIN) 2.5 mg /3 mL (0.083 %) nebu USE 1 VIAL VIA NEBULIZER THREE TIMES DAILY 2/8/21  Yes Amol Hale MD   amiodarone (CORDARONE) 200 mg tablet TAKE 1 TABLET BY MOUTH EVERY DAY 1/13/21  Yes Amol Hale MD   warfarin (COUMADIN) 2 mg tablet Take 3 Tabs by mouth daily. Indications: blood clot in a deep vein of the extremities 12/29/20  Yes Rip Washington NP   Dexilant 60 mg CpDB capsule (delayed release) TAKE ONE CAPSULE BY MOUTH EVERY DAY 12/22/20  Yes Amol Hale MD   ondansetron (ZOFRAN ODT) 8 mg disintegrating tablet 1 p.o. every 6 to 8 hours for nausea and vomiting this does not need to be a tablet that orally dissolves.  10/9/20  Yes Amol Hale MD   amLODIPine (NORVASC) 10 mg tablet TAKE 1 TABLET BY MOUTH EVERY DAY 9/22/20  Yes Amol Hale MD   omeprazole (PRILOSEC) 20 mg capsule TAKE ONE CAPSULE BY MOUTH TWICE DAILY 9/22/20  Yes Renaldo Alarcon MD   aluminum & magnesium hydroxide-simethicone (Maalox Maximum Strength) 400-400-40 mg/5 mL suspension Take 10 mL by mouth every six (6) hours as needed for Indigestion. 9/17/20  Yes Greer Daniels MD   ESTRACE 0.01 % (0.1 mg/gram) vaginal cream INSERT 2 GRAMS INTO VAGINA EVERY MONDAY AND THURSDAY 4/11/17  Yes Bree Ayala MD   cranberry extract 425 mg cap 425 mg. 1/17/14  Yes Provider, Historical   LORazepam (ATIVAN) 0.5 mg tablet 0.5 mg.   Yes Provider, Historical   fluticasone (FLONASE) 50 mcg/actuation nasal spray 1 Spray. Yes Provider, Historical   EPINEPHrine (EPIPEN) 0.3 mg/0.3 mL injection 0.3 mg. 10/1/16  Yes Provider, Historical   sucralfate (CARAFATE) 1 gram tablet TAKE 1 TABLET BY MOUTH FOUR TIMES DAILY 3/28/21   Renaldo Alarcon MD   valGANciclovir (VALCYTE) 450 mg tablet Take 450 mg by mouth daily. Provider, Historical   fluticasone propion-salmeteroL (ADVAIR/WIXELA) 250-50 mcg/dose diskus inhaler Take 1 Puff by inhalation every twelve (12) hours. 3/2/21   Renaldo Alarcon MD   carvediloL (COREG) 6.25 mg tablet Take 1 Tab by mouth two (2) times daily (with meals). 3/2/21   Renaldo Alarcon MD   sucralfate (CARAFATE) 1 gram tablet TAKE 1 TABLET BY MOUTH FOUR TIMES DAILY 11/3/20   Renaldo Alarcon MD   metoprolol tartrate (LOPRESSOR) 50 mg tablet Take 50 mg by mouth daily. 2/22/19   Other, MD Abelardo   hyoscyamine SL (LEVSIN/SL) 0.125 mg SL tablet 0.125 mg by SubLINGual route every four (4) hours as needed. Provider, Historical   fluticasone-salmeterol (ADVAIR DISKUS) 250-50 mcg/dose diskus inhaler Take 1 Puff by inhalation every twelve (12) hours. Provider, Historical   montelukast (SINGULAIR) 10 mg tablet Take 10 mg by mouth daily.       Provider, Historical     Patient Active Problem List   Diagnosis Code    History of kidney transplant Z94.0    CMV (cytomegalovirus) antibody positive R76.8    Hematuria R31.9    Fecal incontinence R15.9    Drug-induced hyperglycemia R73.9, T50.905A    Diverticulitis of intestine K57.92    Anemia D64.9    Acute renal failure ([facility]) N17.9    Asthma J45.909    Exacerbation of asthma J45. 0    Chronic kidney disease, stage III (moderate) ([facility]) N18.30    Chronic obstructive pulmonary disease (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) A41.9    Systemic inflammatory response syndrome (SIRS) ([facility]) R65.10    Ulcer [id number]    Renal cyst N28.1    Obesity E66.9    Migraine G43.909    Hypertension I10    Hypercholesteremia E78.00    GERD (gastroesophageal reflux disease) K21.9    Burning with urination R30.0    Arrhythmia I49.9    Acute recurrent maxillary sinusitis J01.01    Calculus of gallbladder with acute cholecystitis without obstruction K80.00    Hypothyroidism E03.9    Vertigo R42    ESRD (end stage renal disease) on dialysis ([facility]) N18.6, Z99.2    History of DVT (deep vein thrombosis) Z86.718    Stomatitis K12.1    Thrush of mouth and esophagus ([facility]) B37.81, B37.0    Encounter for cholecystectomy Z76.89    Acute left-sided low back pain without sciatica M54.5    Atrial fibrillation ([facility]) I48.91    Chronic anticoagulation Z79.01       Review of Systems   Constitutional: Negative. HENT: Negative. Eyes: Negative. Respiratory: Negative. Cardiovascular: Negative. Gastrointestinal: Positive for heartburn. Genitourinary: Negative. Musculoskeletal: Positive for back pain. Skin: Negative. Neurological: Negative. Endo/Heme/Allergies: Negative. Psychiatric/Behavioral: Positive for depression. The patient is nervous/anxious. Patient-Reported Vitals 8/6/2020   Patient-Reported Weight 150   Patient-Reported Height 3'6\"        Zulema Copeland, who was evaluated through a patient-initiated, synchronous (real-time) audio only encounter, and/or her healthcare decision maker, is aware that it is a billable service, with coverage as determined by her insurance carrier. She provided verbal consent to proceed: n/a- consent obtained within past 12 months. She has not had a related appointment within my department in the past 7 days or scheduled within the next 24 hours.       Total Time: minutes: 21-30 minutes    Woodrow Ornelas MD

## 2021-05-12 RX ORDER — LEVOTHYROXINE SODIUM 50 UG/1
TABLET ORAL
Qty: 90 TAB | Refills: 3 | Status: SHIPPED | OUTPATIENT
Start: 2021-05-12 | End: 2021-08-24 | Stop reason: SDUPTHER

## 2021-05-13 DIAGNOSIS — I48.91 ATRIAL FIBRILLATION, UNSPECIFIED TYPE (HCC): ICD-10-CM

## 2021-05-19 ENCOUNTER — APPOINTMENT (OUTPATIENT)
Dept: CT IMAGING | Age: 66
End: 2021-05-19
Attending: EMERGENCY MEDICINE
Payer: MEDICARE

## 2021-05-19 ENCOUNTER — HOSPITAL ENCOUNTER (EMERGENCY)
Age: 66
Discharge: HOME | End: 2021-05-20
Attending: EMERGENCY MEDICINE | Admitting: EMERGENCY MEDICINE
Payer: MEDICARE

## 2021-05-19 DIAGNOSIS — R42 DIZZINESS: Primary | ICD-10-CM

## 2021-05-19 LAB
ALBUMIN SERPL-MCNC: 3.6 G/DL (ref 3.5–4.7)
ALBUMIN/GLOB SERPL: 1.5 {RATIO}
ALP SERPL-CCNC: 53 U/L (ref 38–126)
ALT SERPL-CCNC: 14 U/L (ref 3–52)
ANION GAP SERPL CALC-SCNC: 11 MMOL/L
AST SERPL W P-5'-P-CCNC: 17 U/L (ref 14–74)
BASOPHILS # BLD: 0.1 K/UL (ref 0–0.1)
BASOPHILS NFR BLD: 1 % (ref 0–2)
BILIRUB SERPL-MCNC: 0.7 MG/DL (ref 0.2–1)
BUN SERPL-MCNC: 44 MG/DL (ref 9–21)
BUN/CREAT SERPL: 6
CA-I BLD-MCNC: 7.9 MG/DL (ref 8.5–10.5)
CHLORIDE SERPL-SCNC: 100 MMOL/L (ref 94–111)
CO2 SERPL-SCNC: 27 MMOL/L (ref 21–33)
CREAT SERPL-MCNC: 7 MG/DL (ref 0.7–1.2)
EOSINOPHIL # BLD: 0.1 K/UL (ref 0–0.4)
EOSINOPHIL NFR BLD: 1 % (ref 0–5)
ERYTHROCYTE [DISTWIDTH] IN BLOOD BY AUTOMATED COUNT: 13.4 % (ref 11.6–14.5)
GLOBULIN SER CALC-MCNC: 2.4 G/DL
GLUCOSE SERPL-MCNC: 122 MG/DL (ref 70–110)
HCT VFR BLD AUTO: 26.3 % (ref 35–45)
HGB BLD-MCNC: 8.2 G/DL (ref 12–16)
IMM GRANULOCYTES # BLD AUTO: 0 K/UL
IMM GRANULOCYTES NFR BLD AUTO: 0 %
LIPASE SERPL-CCNC: 49 U/L (ref 10–57)
LYMPHOCYTES # BLD: 2.6 K/UL (ref 0.9–3.6)
LYMPHOCYTES NFR BLD: 35 % (ref 21–52)
MCH RBC QN AUTO: 32.4 PG (ref 24–34)
MCHC RBC AUTO-ENTMCNC: 31.2 G/DL (ref 31–37)
MCV RBC AUTO: 104 FL (ref 74–97)
MONOCYTES # BLD: 0.5 K/UL (ref 0.05–1.2)
MONOCYTES NFR BLD: 6 % (ref 3–10)
NEUTS SEG # BLD: 4.3 K/UL (ref 1.8–8)
NEUTS SEG NFR BLD: 57 % (ref 40–73)
PLATELET # BLD AUTO: 175 K/UL (ref 135–420)
PMV BLD AUTO: 9.5 FL (ref 9.2–11.8)
POTASSIUM SERPL-SCNC: 3.9 MMOL/L (ref 3.2–5.1)
PROT SERPL-MCNC: 6 G/DL (ref 6.1–8.4)
RBC # BLD AUTO: 2.53 M/UL (ref 4.2–5.3)
SODIUM SERPL-SCNC: 138 MMOL/L (ref 135–145)
TROPONIN I SERPL-MCNC: 0.02 NG/ML (ref 0.02–0.05)
WBC # BLD AUTO: 7.5 K/UL (ref 4.6–13.2)

## 2021-05-19 PROCEDURE — 81015 MICROSCOPIC EXAM OF URINE: CPT

## 2021-05-19 PROCEDURE — 80053 COMPREHEN METABOLIC PANEL: CPT

## 2021-05-19 PROCEDURE — 93005 ELECTROCARDIOGRAM TRACING: CPT

## 2021-05-19 PROCEDURE — 70450 CT HEAD/BRAIN W/O DYE: CPT

## 2021-05-19 PROCEDURE — 85025 COMPLETE CBC W/AUTO DIFF WBC: CPT

## 2021-05-19 PROCEDURE — 75810000275 HC EMERGENCY DEPT VISIT NO LEVEL OF CARE

## 2021-05-19 PROCEDURE — 36415 COLL VENOUS BLD VENIPUNCTURE: CPT

## 2021-05-19 PROCEDURE — 81003 URINALYSIS AUTO W/O SCOPE: CPT

## 2021-05-19 PROCEDURE — 84484 ASSAY OF TROPONIN QUANT: CPT

## 2021-05-19 PROCEDURE — 99285 EMERGENCY DEPT VISIT HI MDM: CPT

## 2021-05-19 PROCEDURE — 83690 ASSAY OF LIPASE: CPT

## 2021-05-20 VITALS
RESPIRATION RATE: 20 BRPM | WEIGHT: 160 LBS | HEIGHT: 62 IN | BODY MASS INDEX: 29.44 KG/M2 | HEART RATE: 80 BPM | TEMPERATURE: 98.6 F | DIASTOLIC BLOOD PRESSURE: 68 MMHG | SYSTOLIC BLOOD PRESSURE: 149 MMHG | OXYGEN SATURATION: 100 %

## 2021-05-20 LAB
APPEARANCE UR: CLEAR
ATRIAL RATE: 72 BPM
BACTERIA URNS QL MICRO: ABNORMAL /HPF
BILIRUB UR QL: NEGATIVE
CALCULATED P AXIS, ECG09: 42 DEGREES
CALCULATED R AXIS, ECG10: 39 DEGREES
CALCULATED T AXIS, ECG11: 57 DEGREES
COLOR UR: ABNORMAL
DIAGNOSIS, 93000: NORMAL
EPITH CASTS URNS QL MICRO: ABNORMAL /LPF (ref 0–20)
GLUCOSE UR STRIP.AUTO-MCNC: NEGATIVE MG/DL
HGB UR QL STRIP: NEGATIVE
KETONES UR QL STRIP.AUTO: NEGATIVE MG/DL
LEUKOCYTE ESTERASE UR QL STRIP.AUTO: NEGATIVE
MUCOUS THREADS URNS QL MICRO: ABNORMAL /LPF
NITRITE UR QL STRIP.AUTO: NEGATIVE
P-R INTERVAL, ECG05: 193 MS
PH UR STRIP: 8 [PH] (ref 5–9)
PROT UR STRIP-MCNC: 100 MG/DL
Q-T INTERVAL, ECG07: 454 MS
QRS DURATION, ECG06: 88 MS
QTC CALCULATION (BEZET), ECG08: 497 MS
RBC #/AREA URNS HPF: ABNORMAL /HPF (ref 0–2)
SP GR UR REFRACTOMETRY: 1.02 (ref 1–1.03)
UROBILINOGEN UR QL STRIP.AUTO: 0.2 EU/DL (ref 0.2–1)
VENTRICULAR RATE, ECG03: 72 BPM
WBC URNS QL MICRO: ABNORMAL /HPF (ref 0–4)

## 2021-05-20 PROCEDURE — 74011250636 HC RX REV CODE- 250/636: Performed by: EMERGENCY MEDICINE

## 2021-05-20 RX ORDER — MECLIZINE HCL 12.5 MG 12.5 MG/1
25 TABLET ORAL DAILY
Status: DISCONTINUED | OUTPATIENT
Start: 2021-05-20 | End: 2021-05-20 | Stop reason: HOSPADM

## 2021-05-20 RX ORDER — MECLIZINE HCL 12.5 MG 12.5 MG/1
25 TABLET ORAL DAILY
Status: DISCONTINUED | OUTPATIENT
Start: 2021-05-20 | End: 2021-05-20

## 2021-05-20 RX ADMIN — MECLIZINE 25 MG: 12.5 TABLET ORAL at 00:45

## 2021-05-20 NOTE — ED NOTES
Back from CT scan aware of need for UA specimen, call bell in reach.  No other concerns voiced at this time

## 2021-05-20 NOTE — ED TRIAGE NOTES
Reports feeling dizzy, off balance, hot flashes, mouth feels funny, \"blurry like I'm going to pass out\" no pain. Symptoms began yesterday.

## 2021-05-20 NOTE — ED PROVIDER NOTES
EMERGENCY DEPARTMENT HISTORY AND PHYSICAL EXAM      Date: 5/19/2021  Patient Name: Manuel Lainez      History of Presenting Illness     Chief Complaint   Patient presents with    Dizziness       History Provided By: Patient and Patient's Sister    HPI: Manuel Lainez, 63 y.o. female with a past medical history significant diabetes, hypertension, hyperlipidemia and asthma presents to the ED with cc of 31-year-old female with history of ESRD on hemodialysis twice a week, hypertension, GERD, hypothyroidism, DVT on anticoagulation, vertigo presenting with dizziness. Onset a couple days ago. Waxing waning. Can be severe at times. Associated with fatigue, nausea. Symptoms are worse with standing and exertion. Alleviated by resting at times, but not always. Patient denies any fever, chest pain, shortness of breath, abdominal pain, diarrhea. Still makes urine, denies dysuria or change in urine output. She has not tried her medications for this. Patient states she has had this before. At times she feels off balance when walking because of the dizziness. She denies other neurologic deficits that are acute. There are no other complaints, changes, or physical findings at this time. PCP: Renaldo Alarcon MD    Current Outpatient Medications   Medication Sig Dispense Refill    levothyroxine (SYNTHROID) 50 mcg tablet TAKE 1 TABLET BY MOUTH EVERY DAY 90 Tab 3    famotidine (PEPCID) 20 mg tablet Take 1 Tab by mouth two (2) times a day. 30 Tab 1    meclizine (ANTIVERT) 25 mg tablet TAKE 1 TABLET BY MOUTH THREE TIMES DAILY FOR UP TO 10 DAYS AS NEEDED FOR DIZZINESS 21 Tab 0    dicyclomine (BENTYL) 10 mg capsule TAKE 1 CAPSULE BY MOUTH FOUR TIMES DAILY 120 Cap 1    sucralfate (CARAFATE) 1 gram tablet TAKE 1 TABLET BY MOUTH FOUR TIMES DAILY 360 Tab 0    fluticasone propion-salmeteroL (ADVAIR/WIXELA) 250-50 mcg/dose diskus inhaler Take 1 Puff by inhalation every twelve (12) hours.  1 Inhaler 3    carvediloL (COREG) 6.25 mg tablet Take 1 Tab by mouth two (2) times daily (with meals). 180 Tab 2    valGANciclovir (VALCYTE) 450 mg tablet Take 2 Tabs by mouth daily. 90 Tab 5    simvastatin (ZOCOR) 20 mg tablet TAKE 1 TABLET BY MOUTH DAILY AS DIRECTED. 90 Tab 1    albuterol (PROVENTIL VENTOLIN) 2.5 mg /3 mL (0.083 %) nebu USE 1 VIAL VIA NEBULIZER THREE TIMES DAILY 90 mL 3    amiodarone (CORDARONE) 200 mg tablet TAKE 1 TABLET BY MOUTH EVERY DAY 90 Tab 2    warfarin (COUMADIN) 2 mg tablet Take 3 Tabs by mouth daily. Indications: blood clot in a deep vein of the extremities 30 Tab 0    Dexilant 60 mg CpDB capsule (delayed release) TAKE ONE CAPSULE BY MOUTH EVERY DAY 30 Cap 5    amLODIPine (NORVASC) 10 mg tablet TAKE 1 TABLET BY MOUTH EVERY DAY (Patient taking differently: 5 mg daily. Indications: high blood pressure) 90 Tab 3    ESTRACE 0.01 % (0.1 mg/gram) vaginal cream INSERT 2 GRAMS INTO VAGINA EVERY MONDAY AND THURSDAY 42.5 g 5    cranberry extract 425 mg cap 425 mg.      hyoscyamine SL (LEVSIN/SL) 0.125 mg SL tablet 0.125 mg by SubLINGual route every four (4) hours as needed.  raNITIdine hcl 300 mg cap Take 300 mg by mouth daily (after breakfast). (Patient not taking: Reported on 5/19/2021) 30 Cap 1    carvediloL (COREG) 6.25 mg tablet Take 6.25 mg by mouth two (2) times daily (with meals).  fluticasone propion-salmeteroL (Advair Diskus) 250-50 mcg/dose diskus inhaler Take 1 Puff by inhalation every twelve (12) hours.  valGANciclovir (VALCYTE) 450 mg tablet Take 450 mg by mouth daily.  sertraline (ZOLOFT) 50 mg tablet Take 1 Tab by mouth daily. (Patient not taking: Reported on 5/19/2021) 90 Tab 0    ondansetron (ZOFRAN ODT) 8 mg disintegrating tablet 1 p.o. every 6 to 8 hours for nausea and vomiting this does not need to be a tablet that orally dissolves.  (Patient not taking: Reported on 5/19/2021) 8 Tab 2    omeprazole (PRILOSEC) 20 mg capsule TAKE ONE CAPSULE BY MOUTH TWICE DAILY (Patient not taking: Reported on 5/19/2021) 180 Cap 2    metoprolol tartrate (LOPRESSOR) 50 mg tablet Take 50 mg by mouth daily.  aluminum & magnesium hydroxide-simethicone (Maalox Maximum Strength) 400-400-40 mg/5 mL suspension Take 10 mL by mouth every six (6) hours as needed for Indigestion. (Patient not taking: Reported on 5/19/2021) 250 mL 0    LORazepam (ATIVAN) 0.5 mg tablet 0.5 mg.      fluticasone (FLONASE) 50 mcg/actuation nasal spray 1 Spray.  EPINEPHrine (EPIPEN) 0.3 mg/0.3 mL injection 0.3 mg.      fluticasone-salmeterol (ADVAIR DISKUS) 250-50 mcg/dose diskus inhaler Take 1 Puff by inhalation every twelve (12) hours.  montelukast (SINGULAIR) 10 mg tablet Take 10 mg by mouth daily. Past History     Past Medical History:  Past Medical History:   Diagnosis Date    Anemia NEC     Arrhythmia     Asthma     Asthma     Burning with urination     frequent uti    CMV (cytomegalovirus) antibody positive     Dyspepsia and other specified disorders of function of stomach     stomach ulcer    Essential hypertension     GERD (gastroesophageal reflux disease)     Hypercholesteremia     Hypertension     Migraine     Obesity     Renal cyst 2002    kidney transplant     Ulcer        Past Surgical History:  Past Surgical History:   Procedure Laterality Date    ENDOSCOPY, COLON, DIAGNOSTIC      with Dr. Shaylee Hutchinson    HX CHOLECYSTECTOMY  02/2021    HX GI      ulcer    HX HEENT      sinus surg    HX RENAL TRANSPLANT      VASCULAR SURGERY PROCEDURE UNLIST      shunt in prep for dialysis       Family History:  Family History   Problem Relation Age of Onset    Colon Polyps Brother     Cancer Mother     Diabetes Father        Social History:  Social History     Tobacco Use    Smoking status: Never Smoker    Smokeless tobacco: Never Used   Vaping Use    Vaping Use: Never used   Substance Use Topics    Alcohol use: No    Drug use: No       Allergies:   Allergies   Allergen Reactions    Aspirin Rash and Unknown (comments)    Bactrim [Sulfamethoxazole-Trimethoprim] Rash and Unknown (comments)    Bromfenac Rash and Unknown (comments)    Cefepime Other (comments)     Neurological reaction    Ceftriaxone Rash    Copper Rash    Ibuprofen Rash and Unknown (comments)    Ketorolac Tromethamine Rash and Unknown (comments)    Morphine Rash and Unknown (comments)    Relafen [Nabumetone] Rash and Unknown (comments)    Rifampin Rash and Unknown (comments)    Vancomycin Rash and Unknown (comments)     Pt reports causes itching, takes benadryl prior to use. Pt denies anaphylaxis. Requires benadryl with each vancomycin dose         Review of Systems     Review of Systems   Constitutional: Negative for fever. HENT: Negative for rhinorrhea. Respiratory: Negative for shortness of breath. Cardiovascular: Negative for chest pain. Gastrointestinal: Positive for nausea. Negative for abdominal pain and vomiting. Genitourinary: Negative for decreased urine volume. Musculoskeletal: Negative for back pain. Skin: Negative for wound. Allergic/Immunologic: Negative for immunocompromised state. Neurological: Positive for dizziness and light-headedness. Negative for syncope and weakness. Hematological: Does not bruise/bleed easily. Psychiatric/Behavioral: Negative for confusion. Physical Exam     Physical Exam  Vitals and nursing note reviewed. Constitutional:       Appearance: Normal appearance. HENT:      Head: Normocephalic and atraumatic. Mouth/Throat:      Mouth: Mucous membranes are moist.   Eyes:      Conjunctiva/sclera: Conjunctivae normal.   Cardiovascular:      Rate and Rhythm: Normal rate and regular rhythm. Pulmonary:      Effort: Pulmonary effort is normal. No respiratory distress. Breath sounds: Normal breath sounds. Abdominal:      General: There is no distension. Palpations: Abdomen is soft. Tenderness: There is no abdominal tenderness.  There is no guarding. Musculoskeletal:         General: No deformity. Skin:     General: Skin is warm and dry. Neurological:      Mental Status: She is alert and oriented to person, place, and time. Cranial Nerves: No cranial nerve deficit. Sensory: No sensory deficit. Motor: No weakness. Coordination: Coordination normal.      Gait: Gait normal.   Psychiatric:         Mood and Affect: Mood normal.         Behavior: Behavior normal.         Lab and Diagnostic Study Results     Labs -     Recent Results (from the past 12 hour(s))   CBC WITH AUTOMATED DIFF    Collection Time: 05/19/21 10:50 PM   Result Value Ref Range    WBC 7.5 4.6 - 13.2 K/uL    RBC 2.53 (L) 4.20 - 5.30 M/uL    HGB 8.2 (L) 12.0 - 16.0 g/dL    HCT 26.3 (L) 35.0 - 45.0 %    .0 (H) 74.0 - 97.0 FL    MCH 32.4 24.0 - 34.0 PG    MCHC 31.2 31.0 - 37.0 g/dL    RDW 13.4 11.6 - 14.5 %    PLATELET 855 598 - 427 K/uL    MPV 9.5 9.2 - 11.8 FL    NEUTROPHILS 57 40 - 73 %    LYMPHOCYTES 35 21 - 52 %    MONOCYTES 6 3 - 10 %    EOSINOPHILS 1 0 - 5 %    BASOPHILS 1 0 - 2 %    IMMATURE GRANULOCYTES 0 %    ABS. NEUTROPHILS 4.3 1.8 - 8.0 K/UL    ABS. LYMPHOCYTES 2.6 0.9 - 3.6 K/UL    ABS. MONOCYTES 0.5 0.05 - 1.2 K/UL    ABS. EOSINOPHILS 0.1 0.0 - 0.4 K/UL    ABS. BASOPHILS 0.1 0.0 - 0.1 K/UL    ABS. IMM. GRANS. 0.0 K/UL   METABOLIC PANEL, COMPREHENSIVE    Collection Time: 05/19/21 10:50 PM   Result Value Ref Range    Sodium 138 135 - 145 mmol/L    Potassium 3.9 3.2 - 5.1 mmol/L    Chloride 100 94 - 111 mmol/L    CO2 27 21 - 33 mmol/L    Anion gap 11 mmol/L    Glucose 122 (H) 70 - 110 mg/dL    BUN 44 (H) 9 - 21 mg/dL    Creatinine 7.00 (H) 0.70 - 1.20 mg/dL    BUN/Creatinine ratio 6      GFR est AA 7 ml/min/1.73m2    GFR est non-AA 6 ml/min/1.73m2    Calcium 7.9 (L) 8.5 - 10.5 mg/dL    Bilirubin, total 0.7 0.2 - 1.0 mg/dL    AST (SGOT) 17 14 - 74 U/L    ALT (SGPT) 14 3 - 52 U/L    Alk.  phosphatase 53 38 - 126 U/L    Protein, total 6.0 (L) 6.1 - 8.4 g/dL    Albumin 3.6 3.5 - 4.7 g/dL    Globulin 2.4 g/dL    A-G Ratio 1.5     LIPASE    Collection Time: 05/19/21 10:50 PM   Result Value Ref Range    Lipase 49 10 - 57 U/L   TROPONIN I    Collection Time: 05/19/21 10:50 PM   Result Value Ref Range    Troponin-I, Qt. 0.02 0.02 - 0.05 ng/mL   URINALYSIS W/ RFLX MICROSCOPIC    Collection Time: 05/19/21 11:50 PM   Result Value Ref Range    Color Yellow/Straw      Appearance Clear Clear      Specific gravity 1.020 1.003 - 1.035      pH (UA) 8.0 5.0 - 9.0      Protein 100 (A) Negative mg/dL    Glucose Negative Negative mg/dL    Ketone Negative Negative mg/dL    Bilirubin Negative Negative      Blood Negative Negative      Urobilinogen 0.2 0.2 - 1.0 EU/dL    Nitrites Negative Negative      Leukocyte Esterase Negative Negative     URINE MICROSCOPIC    Collection Time: 05/19/21 11:50 PM   Result Value Ref Range    WBC 0-4 0 - 4 /hpf    RBC 0-5 0 - 2 /hpf    Epithelial cells Many 0 - 20 /lpf    Bacteria 1+ (A) None /hpf    Mucus 1+ /lpf       Radiologic Studies -   [unfilled]  CT Results  (Last 48 hours)               05/19/21 2325  CT HEAD WO CONT Final result    Impression:      1. No acute intracranial pathology. Narrative:  EXAMINATION:  CT head noncontrast       COMPARISON: None       HISTORY: Left-sided weakness, dizziness       TECHNIQUE: Noncontrasted axial images were obtained through the patient's brain   from the vertex of the skull through the skull base. Bone and soft tissue   windows were reviewed. One or more dose reduction techniques were used on this   CT: automated exposure control, adjustment of the mAs and/or kVp according to   patient size, and iterative reconstruction techniques. The specific techniques   used on this CT exam have been documented in the patient's electronic medical   record.   Digital Imaging and Communications in Medicine (DICOM) format image   data are available to nonaffiliated external healthcare facilities or entities   on a secure, media free, reciprocally searchable basis with patient   authorization for at least a 12-month period after this study. FINDINGS: Brain architecture is normal. No mass effect, midline shift or   hemorrhage. Ventricles are normal in size, position and configuration. No   abnormal extra-axial fluid collections are seen. No territorial signs of   infarct. The bony calvarium appears intact without acute displaced fracture. The   visualized paranasal sinuses and mastoid air cells are aerated. CXR Results  (Last 48 hours)    None          Medical Decision Making and ED Course   - I am the first and primary provider for this patient AND AM THE PRIMARY PROVIDER OF RECORD. - I reviewed the vital signs, available nursing notes, past medical history, past surgical history, family history and social history. - Initial assessment performed. The patients presenting problems have been discussed, and the staff are in agreement with the care plan formulated and outlined with them. I have encouraged them to ask questions as they arise throughout their visit. Vital Signs-Reviewed the patient's vital signs.     Patient Vitals for the past 12 hrs:   Temp Pulse Resp BP SpO2   05/20/21 0115  80 20 (!) 149/68 100 %   05/20/21 0020  81  (!) 150/87    05/20/21 0019  76  (!) 160/81    05/20/21 0016  76  (!) 142/73    05/19/21 2308  77 16 (!) 144/55 100 %   05/19/21 2154 98.6 °F (37 °C) 76 18 (!) 155/76 100 %       EKG interpretation: (Preliminary): Performed at 1030, and read at 1032  Normal sinus rhythm, rate 72, , QTc 497, no ectopy, no acute ST segment elevation or depression, no arrhythmia      Records Reviewed: Nursing Notes, Old Medical Records and Previous electrocardiograms    The patient presents with dizziness with a differential diagnosis of  cardiac dysrhythm, dizziness/vertigo, generalized weakness, GI bleed/hypovolemia, hypertension, labrynthitis, syncope/loss of consciousness, TIA and vasovagal episode    ED Course:              Provider Notes (Medical Decision Making):     MDM     31-year-old female presenting with recurrent dizziness, worse with standing, intermittent for several days. She is in between her dialysis, which she receives twice a week. Mildly hypertensive, without evidence of hypertensive emergency. She is on room air, afebrile, normal heart rate. Extensive work-up here without definitive pathology. Her head CT is negative for bleed, she denies recent fall. UA not concerning for UTI. Chronic anemia, not significantly changed today, no acute leukocytosis or infectious concerns. No recent medication changes. Electrolytes are okay. BUN 44 and creatinine 7 consistent with known ESRD. Troponin is negative even despite her ESRD. LFTs normal.  EKG without evidence of ischemia or arrhythmia. Patient was given meclizine which she has been prescribed for similar episodes before. After this, she is able to ambulate with steady gait, without ataxia. She has no other signs or symptoms of cerebellar pathology. However, given her medical comorbidities and recurrent symptoms, considered vertebrobasilar insufficiency, CVA, TIA. I offered patient admission for further care. She is accompanied by family member who states that this is baseline for her and she has these episodes frequently and has for quite some time. She would like to go home and follow-up outpatient or return for any worsening, which I felt is also reasonable. Disposition     Disposition: Condition improved  DC- Adult Discharges: All of the diagnostic tests were reviewed and questions answered. Diagnosis, care plan and treatment options were discussed. The patient understands the instructions and will follow up as directed. The patients results have been reviewed with them. They have been counseled regarding their diagnosis.   The patient and family member patient and sister verbally convey understanding and agreement of the signs, symptoms, diagnosis, treatment and prognosis and additionally agrees to follow up as recommended with their PCP in 24 - 48 hours. They also agree with the care-plan and convey that all of their questions have been answered. I have also put together some discharge instructions for them that include: 1) educational information regarding their diagnosis, 2) how to care for their diagnosis at home, as well a 3) list of reasons why they would want to return to the ED prior to their follow-up appointment, should their condition change. Discharged      DISCHARGE PLAN:  1. Cannot display discharge medications since this patient is not currently admitted. 2.   Follow-up Information     Follow up With Specialties Details Why Contact Info    Ever Wagner MD Family Medicine Schedule an appointment as soon as possible for a visit   [address] [facility]  [phone]      [facility] EMERGENCY DEPT Emergency Medicine  If symptoms worsen or are constant, or you have trouble walking safely at home, or for any other concerns [place] [id number] [phone]        3. Return to ED if worse   4. Discharge Medication List as of 5/20/2021  1:02 AM          Diagnosis     Clinical Impression:   1. Dizziness        Attestations:    Lawrence Cope MD    Please note that this dictation was completed with Ther[employer], the computer voice recognition software. Quite often unanticipated grammatical, syntax, homophones, and other interpretive errors are inadvertently transcribed by the computer software. Please disregard these errors. Please excuse any errors that have escaped final proofreading. Thank you.

## 2021-05-21 ENCOUNTER — PATIENT OUTREACH (OUTPATIENT)
Dept: CASE MANAGEMENT | Age: 66
End: 2021-05-21

## 2021-05-21 NOTE — PROGRESS NOTES
.  Complex Case Management      Date/Time:  5/21/2021 3:02 PM    Method of communication with patient:phone. [facility] had to call patient's daughter Thad Rene   for patients correct phone number. Said the first contact # was her sister's old house number please get rid of that. The second number is her mother's house number , she did not answer because she is in dialysis . She goes to [facility] ( [facility] ). ACM said only 2 times a week? Daughter said yes she use to go 3 times but they cut it back. She just had to call the dialysis center to have them to tell her mother to turn on her phone. She just finish talking to her. That number is . [facility] called that number and the voicemail has not been set up to call back later. AC called daughter again and she said I just told my  that mom must have turned her phone off again. When she spoke to her mother she just wanted her to know that she has an appointment with Dr Liu Roth on Tuesday 5/25/2021 at 9:45. She suggest that ACM call her mom on Monday before 10:00 that is when she leaves for dialysis. [facility] instructed daughter to have her mom sign updated PHI information the last one noted is from 2013 with her sister and brother noted. Voicing she will.     AC will follow up with patient for Care Mgmt Assessment 5/24/2021    BS follow up appointment(s):   Future Appointments   Date Time Provider Elena Coello   5/25/2021  9:45 AM Siria Graham MD [facility] BS AMB   6/17/2021  2:15 PM Zaire Watts MD [facility] BS AMB

## 2021-05-24 ENCOUNTER — PATIENT OUTREACH (OUTPATIENT)
Dept: CASE MANAGEMENT | Age: 66
End: 2021-05-24

## 2021-05-25 ENCOUNTER — HOSPITAL ENCOUNTER (EMERGENCY)
Age: 66
Discharge: HOME | End: 2021-05-25
Attending: FAMILY MEDICINE
Payer: MEDICARE

## 2021-05-25 VITALS
OXYGEN SATURATION: 100 % | HEIGHT: 62 IN | HEART RATE: 88 BPM | DIASTOLIC BLOOD PRESSURE: 62 MMHG | WEIGHT: 159 LBS | SYSTOLIC BLOOD PRESSURE: 133 MMHG | BODY MASS INDEX: 29.26 KG/M2 | TEMPERATURE: 98.2 F | RESPIRATION RATE: 18 BRPM

## 2021-05-25 DIAGNOSIS — R11.2 NAUSEA AND VOMITING IN ADULT PATIENT: ICD-10-CM

## 2021-05-25 DIAGNOSIS — R10.84 ABDOMINAL PAIN, GENERALIZED: Primary | ICD-10-CM

## 2021-05-25 LAB
ALBUMIN SERPL-MCNC: 3.6 G/DL (ref 3.5–4.7)
ALBUMIN/GLOB SERPL: 1.2 {RATIO}
ALP SERPL-CCNC: 47 U/L (ref 38–126)
ALT SERPL-CCNC: 17 U/L (ref 3–52)
AMYLASE SERPL-CCNC: 45 U/L (ref 30–100)
ANION GAP SERPL CALC-SCNC: 14 MMOL/L
AST SERPL W P-5'-P-CCNC: 44 U/L (ref 14–74)
BASOPHILS # BLD: 0 K/UL (ref 0–0.1)
BASOPHILS NFR BLD: 0 % (ref 0–2)
BILIRUB SERPL-MCNC: 1.6 MG/DL (ref 0.2–1)
BUN SERPL-MCNC: 28 MG/DL (ref 9–21)
BUN/CREAT SERPL: 5
CA-I BLD-MCNC: 7.5 MG/DL (ref 8.5–10.5)
CHLORIDE SERPL-SCNC: 96 MMOL/L (ref 94–111)
CO2 SERPL-SCNC: 25 MMOL/L (ref 21–33)
CREAT SERPL-MCNC: 5.7 MG/DL (ref 0.7–1.2)
EOSINOPHIL # BLD: 0.1 K/UL (ref 0–0.4)
EOSINOPHIL NFR BLD: 1 % (ref 0–5)
ERYTHROCYTE [DISTWIDTH] IN BLOOD BY AUTOMATED COUNT: 13.8 % (ref 11.6–14.5)
GLOBULIN SER CALC-MCNC: 2.9 G/DL
GLUCOSE SERPL-MCNC: 85 MG/DL (ref 70–110)
HCT VFR BLD AUTO: 27.1 % (ref 35–45)
HGB BLD-MCNC: 8.5 G/DL (ref 12–16)
IMM GRANULOCYTES # BLD AUTO: 0 K/UL
IMM GRANULOCYTES NFR BLD AUTO: 0 %
LIPASE SERPL-CCNC: 39 U/L (ref 10–57)
LYMPHOCYTES # BLD: 1.1 K/UL (ref 0.9–3.6)
LYMPHOCYTES NFR BLD: 15 % (ref 21–52)
MCH RBC QN AUTO: 32.6 PG (ref 24–34)
MCHC RBC AUTO-ENTMCNC: 31.4 G/DL (ref 31–37)
MCV RBC AUTO: 103.8 FL (ref 74–97)
MONOCYTES # BLD: 0 K/UL (ref 0.05–1.2)
MONOCYTES NFR BLD: 1 % (ref 3–10)
NEUTS SEG # BLD: 5.9 K/UL (ref 1.8–8)
NEUTS SEG NFR BLD: 83 % (ref 40–73)
PLATELET # BLD AUTO: 189 K/UL (ref 135–420)
PMV BLD AUTO: 10.2 FL (ref 9.2–11.8)
POTASSIUM SERPL-SCNC: 5.1 MMOL/L (ref 3.2–5.1)
PROT SERPL-MCNC: 6.5 G/DL (ref 6.1–8.4)
RBC # BLD AUTO: 2.61 M/UL (ref 4.2–5.3)
SODIUM SERPL-SCNC: 135 MMOL/L (ref 135–145)
WBC # BLD AUTO: 7.2 K/UL (ref 4.6–13.2)

## 2021-05-25 PROCEDURE — 96376 TX/PRO/DX INJ SAME DRUG ADON: CPT

## 2021-05-25 PROCEDURE — 74011250636 HC RX REV CODE- 250/636: Performed by: FAMILY MEDICINE

## 2021-05-25 PROCEDURE — 80053 COMPREHEN METABOLIC PANEL: CPT

## 2021-05-25 PROCEDURE — 96374 THER/PROPH/DIAG INJ IV PUSH: CPT

## 2021-05-25 PROCEDURE — 74011250637 HC RX REV CODE- 250/637: Performed by: FAMILY MEDICINE

## 2021-05-25 PROCEDURE — 85025 COMPLETE CBC W/AUTO DIFF WBC: CPT

## 2021-05-25 PROCEDURE — 99284 EMERGENCY DEPT VISIT MOD MDM: CPT

## 2021-05-25 PROCEDURE — 36415 COLL VENOUS BLD VENIPUNCTURE: CPT

## 2021-05-25 PROCEDURE — 82150 ASSAY OF AMYLASE: CPT

## 2021-05-25 PROCEDURE — 83690 ASSAY OF LIPASE: CPT

## 2021-05-25 RX ORDER — ONDANSETRON 2 MG/ML
8 INJECTION INTRAMUSCULAR; INTRAVENOUS
Status: COMPLETED | OUTPATIENT
Start: 2021-05-25 | End: 2021-05-25

## 2021-05-25 RX ORDER — ONDANSETRON 4 MG/1
4 TABLET, ORALLY DISINTEGRATING ORAL
Qty: 20 TABLET | Refills: 0 | Status: SHIPPED | OUTPATIENT
Start: 2021-05-25 | End: 2021-07-30

## 2021-05-25 RX ORDER — DICYCLOMINE HYDROCHLORIDE 10 MG/1
10 CAPSULE ORAL 4 TIMES DAILY
Qty: 30 CAPSULE | Refills: 0 | Status: SHIPPED | OUTPATIENT
Start: 2021-05-25 | End: 2021-06-17 | Stop reason: SDUPTHER

## 2021-05-25 RX ORDER — ONDANSETRON 2 MG/ML
4 INJECTION INTRAMUSCULAR; INTRAVENOUS
Status: COMPLETED | OUTPATIENT
Start: 2021-05-25 | End: 2021-05-25

## 2021-05-25 RX ORDER — DICYCLOMINE HYDROCHLORIDE 10 MG/1
10 CAPSULE ORAL
Status: COMPLETED | OUTPATIENT
Start: 2021-05-25 | End: 2021-05-25

## 2021-05-25 RX ADMIN — SODIUM CHLORIDE 250 ML: 9 INJECTION, SOLUTION INTRAVENOUS at 20:40

## 2021-05-25 RX ADMIN — ONDANSETRON 4 MG: 2 INJECTION INTRAMUSCULAR; INTRAVENOUS at 20:42

## 2021-05-25 RX ADMIN — ONDANSETRON 8 MG: 2 INJECTION INTRAMUSCULAR; INTRAVENOUS at 22:10

## 2021-05-25 RX ADMIN — DICYCLOMINE HYDROCHLORIDE 10 MG: 10 CAPSULE ORAL at 22:13

## 2021-05-25 NOTE — ED TRIAGE NOTES
Diarrhea earlier today, now with nausea and abdominal pain. Has not vomited.  HD. Full run of HD on Monday.

## 2021-05-26 ENCOUNTER — PATIENT OUTREACH (OUTPATIENT)
Dept: CASE MANAGEMENT | Age: 66
End: 2021-05-26

## 2021-05-26 NOTE — PROGRESS NOTES
.Complex Case Management  Follow-Up       Date/Time:   5/26/2021  12:44PM       Ambulatory Care Manager ( ACM) contacted patient for Complex Case Management  follow up. Called both numbers of record no mail box to leave a message. ACM called daughter Mrs Cr Tan who was spoken to on Friday 5/21/2021. ACM asked if daughter knew were patient is she is not answering her phone. Daughter asked ACM for her number to give to her mother for a return call and it was provided. ACM notes patient was seen in [facility] ED 5/25/2021 for abdominal pain with vomiting.  Noted GI appointment with Dr Genie Flores @ 2:15 pm

## 2021-05-26 NOTE — PROGRESS NOTES
.Date/Time:  5/26/2021 8:39 AM    Method of communication with patient:phone    [address] [facility] ( [facility]) made out reach to  patient by telephone to offer Complex Case Management  ( CCM). Provided introduction to self, and explanation of the Ambulatory Care . Contact at [phone] anytime Monday thru Friday 08:00-4:30.

## 2021-05-26 NOTE — ED PROVIDER NOTES
EMERGENCY DEPARTMENT HISTORY AND PHYSICAL EXAM      Date: 5/25/2021  Patient Name: Wander Campo    History of Presenting Illness     Chief Complaint   Patient presents with    Abdominal Pain       History Provided By: Patient    HPI: Wander Campo, 63 y.o. female with a past medical history significant End-stage renal disease on dialysis, hypertension, obesity, hypothyroidism and multiple other issues presents to the ED with cc of abdominal pain and nausea. Patient was in her usual state of health yesterday, woke this morning feeling poorly, she has had 3 episodes of vomiting today, has had diarrhea. She has been able to tolerate oral liquids, has not eaten. She has vague abdominal pain, no radiation, no fever or chills. There are no other complaints, changes, or physical findings at this time. PCP: Dank Lam MD    No current facility-administered medications on file prior to encounter. Current Outpatient Medications on File Prior to Encounter   Medication Sig Dispense Refill    levothyroxine (SYNTHROID) 50 mcg tablet TAKE 1 TABLET BY MOUTH EVERY DAY 90 Tab 3    famotidine (PEPCID) 20 mg tablet Take 1 Tab by mouth two (2) times a day. 30 Tab 1    meclizine (ANTIVERT) 25 mg tablet TAKE 1 TABLET BY MOUTH THREE TIMES DAILY FOR UP TO 10 DAYS AS NEEDED FOR DIZZINESS 21 Tab 0    dicyclomine (BENTYL) 10 mg capsule TAKE 1 CAPSULE BY MOUTH FOUR TIMES DAILY 120 Cap 1    sucralfate (CARAFATE) 1 gram tablet TAKE 1 TABLET BY MOUTH FOUR TIMES DAILY 360 Tab 0    fluticasone propion-salmeteroL (ADVAIR/WIXELA) 250-50 mcg/dose diskus inhaler Take 1 Puff by inhalation every twelve (12) hours. 1 Inhaler 3    carvediloL (COREG) 6.25 mg tablet Take 1 Tab by mouth two (2) times daily (with meals). 180 Tab 2    valGANciclovir (VALCYTE) 450 mg tablet Take 2 Tabs by mouth daily.  90 Tab 5    simvastatin (ZOCOR) 20 mg tablet TAKE 1 TABLET BY MOUTH DAILY AS DIRECTED. 90 Tab 1    albuterol (PROVENTIL VENTOLIN) 2.5 mg /3 mL (0.083 %) nebu USE 1 VIAL VIA NEBULIZER THREE TIMES DAILY 90 mL 3    amiodarone (CORDARONE) 200 mg tablet TAKE 1 TABLET BY MOUTH EVERY DAY 90 Tab 2    warfarin (COUMADIN) 2 mg tablet Take 3 Tabs by mouth daily. Indications: blood clot in a deep vein of the extremities 30 Tab 0    Dexilant 60 mg CpDB capsule (delayed release) TAKE ONE CAPSULE BY MOUTH EVERY DAY 30 Cap 5    ondansetron (ZOFRAN ODT) 8 mg disintegrating tablet 1 p.o. every 6 to 8 hours for nausea and vomiting this does not need to be a tablet that orally dissolves. 8 Tab 2    omeprazole (PRILOSEC) 20 mg capsule TAKE ONE CAPSULE BY MOUTH TWICE DAILY 180 Cap 2    aluminum & magnesium hydroxide-simethicone (Maalox Maximum Strength) 400-400-40 mg/5 mL suspension Take 10 mL by mouth every six (6) hours as needed for Indigestion. 250 mL 0    ESTRACE 0.01 % (0.1 mg/gram) vaginal cream INSERT 2 GRAMS INTO VAGINA EVERY MONDAY AND THURSDAY 42.5 g 5    LORazepam (ATIVAN) 0.5 mg tablet 0.5 mg.      raNITIdine hcl 300 mg cap Take 300 mg by mouth daily (after breakfast). (Patient not taking: Reported on 5/19/2021) 30 Cap 1    carvediloL (COREG) 6.25 mg tablet Take 6.25 mg by mouth two (2) times daily (with meals).  fluticasone propion-salmeteroL (Advair Diskus) 250-50 mcg/dose diskus inhaler Take 1 Puff by inhalation every twelve (12) hours.  valGANciclovir (VALCYTE) 450 mg tablet Take 450 mg by mouth daily.  sertraline (ZOLOFT) 50 mg tablet Take 1 Tab by mouth daily. (Patient not taking: Reported on 5/19/2021) 90 Tab 0    amLODIPine (NORVASC) 10 mg tablet TAKE 1 TABLET BY MOUTH EVERY DAY (Patient taking differently: 5 mg daily. Indications: high blood pressure) 90 Tab 3    metoprolol tartrate (LOPRESSOR) 50 mg tablet Take 50 mg by mouth daily.  cranberry extract 425 mg cap 425 mg.      fluticasone (FLONASE) 50 mcg/actuation nasal spray 1 Spray.       EPINEPHrine (EPIPEN) 0.3 mg/0.3 mL injection 0.3 mg.      hyoscyamine SL (LEVSIN/SL) 0.125 mg SL tablet 0.125 mg by SubLINGual route every four (4) hours as needed.  fluticasone-salmeterol (ADVAIR DISKUS) 250-50 mcg/dose diskus inhaler Take 1 Puff by inhalation every twelve (12) hours.  montelukast (SINGULAIR) 10 mg tablet Take 10 mg by mouth daily. Past History     Past Medical History:  Past Medical History:   Diagnosis Date    Anemia NEC     Arrhythmia     Asthma     Asthma     Burning with urination     frequent uti    CMV (cytomegalovirus) antibody positive     Dyspepsia and other specified disorders of function of stomach     stomach ulcer    Essential hypertension     GERD (gastroesophageal reflux disease)     Hypercholesteremia     Hypertension     Migraine     Obesity     Renal cyst 2002    kidney transplant     Ulcer        Past Surgical History:  Past Surgical History:   Procedure Laterality Date    ENDOSCOPY, COLON, DIAGNOSTIC      with Dr. Issac Nice    HX CHOLECYSTECTOMY  02/2021    HX GI      ulcer    HX HEENT      sinus surg    HX RENAL TRANSPLANT      VASCULAR SURGERY PROCEDURE UNLIST      shunt in prep for dialysis       Family History:  Family History   Problem Relation Age of Onset    Colon Polyps Brother     Cancer Mother     Diabetes Father        Social History:  Social History     Tobacco Use    Smoking status: Never Smoker    Smokeless tobacco: Never Used   Vaping Use    Vaping Use: Never used   Substance Use Topics    Alcohol use: No    Drug use: No       Allergies:   Allergies   Allergen Reactions    Aspirin Rash and Unknown (comments)    Bactrim [Sulfamethoxazole-Trimethoprim] Rash and Unknown (comments)    Bromfenac Rash and Unknown (comments)    Cefepime Other (comments)     Neurological reaction    Ceftriaxone Rash    Copper Rash    Ibuprofen Rash and Unknown (comments)    Ketorolac Tromethamine Rash and Unknown (comments)    Morphine Rash and Unknown (comments)    Relafen [Nabumetone] Rash and Unknown (comments)    Rifampin Rash and Unknown (comments)    Vancomycin Rash and Unknown (comments)     Pt reports causes itching, takes benadryl prior to use. Pt denies anaphylaxis. Requires benadryl with each vancomycin dose         Review of Systems     Review of Systems   Constitutional: Negative for chills and fever. HENT: Negative for ear pain, rhinorrhea, sneezing and sore throat. Eyes: Negative for pain and discharge. Respiratory: Negative for cough, shortness of breath and wheezing. Cardiovascular: Negative for chest pain. Gastrointestinal: Positive for abdominal pain, diarrhea, nausea and vomiting. Negative for constipation. Endocrine: Negative for polydipsia and polyphagia. Genitourinary: Negative for dysuria, flank pain, frequency, hematuria and urgency. Musculoskeletal: Positive for arthralgias. Negative for back pain, joint swelling and neck pain. Skin: Negative for rash. Neurological: Negative for dizziness, weakness, light-headedness, numbness and headaches. Hematological: Negative for adenopathy. Psychiatric/Behavioral: Negative for agitation, behavioral problems and self-injury. All other systems reviewed and are negative. Physical Exam     Physical Exam  Vitals and nursing note reviewed. Constitutional:       General: She is not in acute distress. Appearance: Normal appearance. She is not toxic-appearing. HENT:      Head: Normocephalic and atraumatic. Right Ear: Tympanic membrane normal.      Left Ear: Tympanic membrane normal.      Nose: No congestion or rhinorrhea. Mouth/Throat:      Mouth: Mucous membranes are moist.   Eyes:      Extraocular Movements: Extraocular movements intact. Pupils: Pupils are equal, round, and reactive to light. Cardiovascular:      Rate and Rhythm: Normal rate and regular rhythm. Heart sounds: Normal heart sounds. Pulmonary:      Effort: Pulmonary effort is normal. No respiratory distress. Breath sounds: Normal breath sounds. No wheezing, rhonchi or rales. Abdominal:      General: Bowel sounds are normal. There is no distension. Palpations: Abdomen is soft. Tenderness: There is generalized abdominal tenderness. There is no guarding or rebound. Musculoskeletal:         General: No swelling or tenderness. Normal range of motion. Cervical back: Normal range of motion and neck supple. Skin:     General: Skin is warm and dry. Neurological:      General: No focal deficit present. Mental Status: She is alert and oriented to person, place, and time. Psychiatric:         Mood and Affect: Mood normal.         Behavior: Behavior normal.         Lab and Diagnostic Study Results     Labs -     Recent Results (from the past 12 hour(s))   CBC WITH AUTOMATED DIFF    Collection Time: 05/25/21  7:32 PM   Result Value Ref Range    WBC 7.2 4.6 - 13.2 K/uL    RBC 2.61 (L) 4.20 - 5.30 M/uL    HGB 8.5 (L) 12.0 - 16.0 g/dL    HCT 27.1 (L) 35.0 - 45.0 %    .8 (H) 74.0 - 97.0 FL    MCH 32.6 24.0 - 34.0 PG    MCHC 31.4 31.0 - 37.0 g/dL    RDW 13.8 11.6 - 14.5 %    PLATELET 045 798 - 732 K/uL    MPV 10.2 9.2 - 11.8 FL    NEUTROPHILS 83 (H) 40 - 73 %    LYMPHOCYTES 15 (L) 21 - 52 %    MONOCYTES 1 (L) 3 - 10 %    EOSINOPHILS 1 0 - 5 %    BASOPHILS 0 0 - 2 %    IMMATURE GRANULOCYTES 0 %    ABS. NEUTROPHILS 5.9 1.8 - 8.0 K/UL    ABS. LYMPHOCYTES 1.1 0.9 - 3.6 K/UL    ABS. MONOCYTES 0.0 (L) 0.05 - 1.2 K/UL    ABS. EOSINOPHILS 0.1 0.0 - 0.4 K/UL    ABS. BASOPHILS 0.0 0.0 - 0.1 K/UL    ABS. IMM.  GRANS. 0.0 K/UL   METABOLIC PANEL, COMPREHENSIVE    Collection Time: 05/25/21  7:32 PM   Result Value Ref Range    Sodium 135 135 - 145 mmol/L    Potassium 5.1 3.2 - 5.1 mmol/L    Chloride 96 94 - 111 mmol/L    CO2 25 21 - 33 mmol/L    Anion gap 14 mmol/L    Glucose 85 70 - 110 mg/dL    BUN 28 (H) 9 - 21 mg/dL    Creatinine 5.70 (H) 0.70 - 1.20 mg/dL    BUN/Creatinine ratio 5      GFR est AA 9 ml/min/1.73m2 GFR est non-AA 8 ml/min/1.73m2    Calcium 7.5 (L) 8.5 - 10.5 mg/dL    Bilirubin, total 1.6 (H) 0.2 - 1.0 mg/dL    AST (SGOT) 44 14 - 74 U/L    ALT (SGPT) 17 3 - 52 U/L    Alk. phosphatase 47 38 - 126 U/L    Protein, total 6.5 6.1 - 8.4 g/dL    Albumin 3.6 3.5 - 4.7 g/dL    Globulin 2.9 g/dL    A-G Ratio 1.2     AMYLASE    Collection Time: 05/25/21  7:32 PM   Result Value Ref Range    Amylase 45 30 - 100 U/L   LIPASE    Collection Time: 05/25/21  7:32 PM   Result Value Ref Range    Lipase 39 10 - 57 U/L       Radiologic Studies -   @[email or web address]@  CT Results  (Last 48 hours)    None        CXR Results  (Last 48 hours)    None            Medical Decision Making   - I am the first provider for this patient. - I reviewed the vital signs, available nursing notes, past medical history, past surgical history, family history and social history. - Initial assessment performed. The patients presenting problems have been discussed, and they are in agreement with the care plan formulated and outlined with them. I have encouraged them to ask questions as they arise throughout their visit. Vital Signs-Reviewed the patient's vital signs. Patient Vitals for the past 12 hrs:   Temp Pulse Resp BP SpO2   05/25/21 2200  89 18 (!) 126/56 100 %   05/25/21 1908 98.2 °F (36.8 °C) 82 20 133/66 100 %       Records Reviewed: Nursing Notes and Old Medical Records    The patient presents with abdominal pain with a differential diagnosis of abdominal pain, biliary colic, diverticulitis, gastritis, gastroenteritis and UTI      ED Course:          Provider Notes (Medical Decision Making):   Patient seen shortly after arrival, appears uncomfortable, nontoxic.   History and physical exam performed, abdomen is soft with good bowel sounds throughout, nonfocal tenderness without guarding or rebound, reassuring exam.  Labs are reviewed and discussed, white count is normal, amylase lipase and hepatic function is normal.  Patient is sleeping regularly between reassessments, has had no vomiting during emergency department stay. Gastroenteritis is present in the community. She will be discharged home, follow with PCP, antiemetic and antispasmodic is given. She may return to the emergency department if needed. MDM       Procedures   Medical Decision Makingedical Decision Making  Performed by: Lee Garcia DO  PROCEDURES:  Procedures       Disposition   Disposition: Condition stable  DC- Adult Discharges: All of the diagnostic tests were reviewed and questions answered. Diagnosis, care plan and treatment options were discussed. The patient understands the instructions and will follow up as directed. The patients results have been reviewed with them. They have been counseled regarding their diagnosis. The patient verbally convey understanding and agreement of the signs, symptoms, diagnosis, treatment and prognosis and additionally agrees to follow up as recommended with their PCP in 24 - 48 hours. They also agree with the care-plan and convey that all of their questions have been answered. I have also put together some discharge instructions for them that include: 1) educational information regarding their diagnosis, 2) how to care for their diagnosis at home, as well a 3) list of reasons why they would want to return to the ED prior to their follow-up appointment, should their condition change. Discharged    DISCHARGE PLAN:  1. Current Discharge Medication List      CONTINUE these medications which have NOT CHANGED    Details   levothyroxine (SYNTHROID) 50 mcg tablet TAKE 1 TABLET BY MOUTH EVERY DAY  Qty: 90 Tab, Refills: 3      famotidine (PEPCID) 20 mg tablet Take 1 Tab by mouth two (2) times a day.   Qty: 30 Tab, Refills: 1    Associated Diagnoses: Gastroesophageal reflux disease without esophagitis      meclizine (ANTIVERT) 25 mg tablet TAKE 1 TABLET BY MOUTH THREE TIMES DAILY FOR UP TO 10 DAYS AS NEEDED FOR DIZZINESS  Qty: 21 Tab, Refills: 0    Associated Diagnoses: Dizziness      dicyclomine (BENTYL) 10 mg capsule TAKE 1 CAPSULE BY MOUTH FOUR TIMES DAILY  Qty: 120 Cap, Refills: 1      sucralfate (CARAFATE) 1 gram tablet TAKE 1 TABLET BY MOUTH FOUR TIMES DAILY  Qty: 360 Tab, Refills: 0      !! fluticasone propion-salmeteroL (ADVAIR/WIXELA) 250-50 mcg/dose diskus inhaler Take 1 Puff by inhalation every twelve (12) hours. Qty: 1 Inhaler, Refills: 3    Associated Diagnoses: Seasonal allergic rhinitis due to pollen      !! carvediloL (COREG) 6.25 mg tablet Take 1 Tab by mouth two (2) times daily (with meals). Qty: 180 Tab, Refills: 2    Associated Diagnoses: Hypertension, unspecified type      !! valGANciclovir (VALCYTE) 450 mg tablet Take 2 Tabs by mouth daily. Qty: 90 Tab, Refills: 5    Associated Diagnoses: ESRD (end stage renal disease) on dialysis ([facility])      simvastatin (ZOCOR) 20 mg tablet TAKE 1 TABLET BY MOUTH DAILY AS DIRECTED. Qty: 90 Tab, Refills: 1      albuterol (PROVENTIL VENTOLIN) 2.5 mg /3 mL (0.083 %) nebu USE 1 VIAL VIA NEBULIZER THREE TIMES DAILY  Qty: 90 mL, Refills: 3      amiodarone (CORDARONE) 200 mg tablet TAKE 1 TABLET BY MOUTH EVERY DAY  Qty: 90 Tab, Refills: 2      warfarin (COUMADIN) 2 mg tablet Take 3 Tabs by mouth daily. Indications: blood clot in a deep vein of the extremities  Qty: 30 Tab, Refills: 0      Dexilant 60 mg CpDB capsule (delayed release) TAKE ONE CAPSULE BY MOUTH EVERY DAY  Qty: 30 Cap, Refills: 5      ondansetron (ZOFRAN ODT) 8 mg disintegrating tablet 1 p.o. every 6 to 8 hours for nausea and vomiting this does not need to be a tablet that orally dissolves.   Qty: 8 Tab, Refills: 2    Associated Diagnoses: Calculus of gallbladder with acute cholecystitis without obstruction      omeprazole (PRILOSEC) 20 mg capsule TAKE ONE CAPSULE BY MOUTH TWICE DAILY  Qty: 180 Cap, Refills: 2      aluminum & magnesium hydroxide-simethicone (Maalox Maximum Strength) 400-400-40 mg/5 mL suspension Take 10 mL by mouth every six (6) hours as needed for Indigestion. Qty: 250 mL, Refills: 0      ESTRACE 0.01 % (0.1 mg/gram) vaginal cream INSERT 2 GRAMS INTO VAGINA EVERY MONDAY AND THURSDAY  Qty: 42.5 g, Refills: 5      LORazepam (ATIVAN) 0.5 mg tablet 0.5 mg.      raNITIdine hcl 300 mg cap Take 300 mg by mouth daily (after breakfast). Qty: 30 Cap, Refills: 1    Associated Diagnoses: Gastroesophageal reflux disease with esophagitis without hemorrhage      !! carvediloL (COREG) 6.25 mg tablet Take 6.25 mg by mouth two (2) times daily (with meals). !! fluticasone propion-salmeteroL (Advair Diskus) 250-50 mcg/dose diskus inhaler Take 1 Puff by inhalation every twelve (12) hours. !! valGANciclovir (VALCYTE) 450 mg tablet Take 450 mg by mouth daily. sertraline (ZOLOFT) 50 mg tablet Take 1 Tab by mouth daily. Qty: 90 Tab, Refills: 0      amLODIPine (NORVASC) 10 mg tablet TAKE 1 TABLET BY MOUTH EVERY DAY  Qty: 90 Tab, Refills: 3      metoprolol tartrate (LOPRESSOR) 50 mg tablet Take 50 mg by mouth daily. cranberry extract 425 mg cap 425 mg.      fluticasone (FLONASE) 50 mcg/actuation nasal spray 1 Spray. EPINEPHrine (EPIPEN) 0.3 mg/0.3 mL injection 0.3 mg.      hyoscyamine SL (LEVSIN/SL) 0.125 mg SL tablet 0.125 mg by SubLINGual route every four (4) hours as needed. !! fluticasone-salmeterol (ADVAIR DISKUS) 250-50 mcg/dose diskus inhaler Take 1 Puff by inhalation every twelve (12) hours. montelukast (SINGULAIR) 10 mg tablet Take 10 mg by mouth daily. !! - Potential duplicate medications found. Please discuss with provider. 2.   Follow-up Information     Follow up With Specialties Details Why Marco Antonio Morgan MD Family Medicine  As needed, If symptoms worsen [place] [id number]  [phone]          3. Return to ED if worse   4.    Current Discharge Medication List      START taking these medications    Details   !! ondansetron (Zofran ODT) 4 mg disintegrating tablet 1 Tablet by SubLINGual route every eight (8) hours as needed for Nausea or Vomiting. Qty: 20 Tablet, Refills: 0  Start date: 5/25/2021      !! dicyclomine (BENTYL) 10 mg capsule Take 1 Capsule by mouth four (4) times daily. Qty: 30 Capsule, Refills: 0  Start date: 5/25/2021       !! - Potential duplicate medications found. Please discuss with provider. CONTINUE these medications which have NOT CHANGED    Details   !! dicyclomine (BENTYL) 10 mg capsule TAKE 1 CAPSULE BY MOUTH FOUR TIMES DAILY  Qty: 120 Cap, Refills: 1      !! ondansetron (ZOFRAN ODT) 8 mg disintegrating tablet 1 p.o. every 6 to 8 hours for nausea and vomiting this does not need to be a tablet that orally dissolves. Qty: 8 Tab, Refills: 2    Associated Diagnoses: Calculus of gallbladder with acute cholecystitis without obstruction       !! - Potential duplicate medications found. Please discuss with provider. Diagnosis     Clinical Impression:   1. Abdominal pain, generalized    2. Nausea and vomiting in adult patient        Attestations:    Marika Acevedo, DO    Please note that this dictation was completed with TUTORize, the computer voice recognition software. Quite often unanticipated grammatical, syntax, homophones, and other interpretive errors are inadvertently transcribed by the computer software. Please disregard these errors. Please excuse any errors that have escaped final proofreading. Thank you.

## 2021-06-03 ENCOUNTER — PATIENT OUTREACH (OUTPATIENT)
Dept: CASE MANAGEMENT | Age: 66
End: 2021-06-03

## 2021-06-03 NOTE — PROGRESS NOTES
Care Transitions Initial Call    Call within 2 business days of discharge: Yes     Patient: Len Cheung Patient : 1955 MRN: [id number]    Hospital Problem List  Sepsis [facility])   Pyelonephritis      Was this an external facility discharge? Yes, 21 Discharge Facility: [facility]  6/3/2021 2:03 PM  Call placed to patient at both numbers. Attempted to reach patient for [facility] follow up. No answer and there was no way to leave a message because home phone voice mail full. Cell voicemail not set up. [facility] follow up appointment(s):   Future Appointments   Date Time Provider Elena Coello   2021  2:30 PM Edvin Cochran MD [facility] BS AMB   2021  2:15 PM Indra Baldwin MD [facility] BS AMB       Goals Addressed                 This Visit's Progress     Prevent complications post hospitalization. 1. CTN will monitor X 4 weeks    2. Ensure provider appt is scheduled within 7 days post-discharge  6/3/2021 PCP follow up 21  3. Confirm patient attended post-discharge provider apt    4. Complete post-visit call to confirm attendance and update care needs      5. Review/educate common or potential \"red flags\" of condition worsening  6/3/2021  Confusion   Fast breathing   Chills or shaking   Fever or low body temperature   Fast heart beat   Lightheadedness   Less urine output   Skin rash or skin color changes   6. Evaluate adherence to medications and priority barriers to resolve        7. Instruct on adherence to medications as ordered and assess for therapeutic response and side-effects         8. Discuss and evaluate ADL performance. Provide recommendations on energy conservation, particularly related to transition home from an inpatient admission.

## 2021-06-04 ENCOUNTER — PATIENT OUTREACH (OUTPATIENT)
Dept: CASE MANAGEMENT | Age: 66
End: 2021-06-04

## 2021-06-04 NOTE — PROGRESS NOTES
2021   11:35 AM  Care Transitions Initial Call    Call within 2 business days of discharge: Yes     Patient: Kesha Kennedy Patient : 1955 MRN: [id number]    Hospital Problem List  Sepsis [facility])   Pyelonephritis      Was this an external facility discharge? Yes, 21 Discharge Facility: [facility]  Call placed to patient at both numbers. Attempted to reach patient for [facility] follow up. No answer and there was no way to leave a message because home phone voice mail full. Cell voicemail not set up.   [address] follow up appointment(s):   Future Appointments   Date Time Provider Elena Coello   2021  2:30 PM Julio Salazar MD [facility] BS AMB   2021  2:15 PM Dl Stuart MD [facility] BS AMB       Goals Addressed    None

## 2021-06-07 ENCOUNTER — VIRTUAL VISIT (OUTPATIENT)
Dept: FAMILY MEDICINE CLINIC | Age: 66
End: 2021-06-07
Payer: MEDICARE

## 2021-06-07 DIAGNOSIS — N30.00 ACUTE CYSTITIS WITHOUT HEMATURIA: Primary | ICD-10-CM

## 2021-06-07 PROCEDURE — 99442 PR PHYS/QHP TELEPHONE EVALUATION 11-20 MIN: CPT | Performed by: FAMILY MEDICINE

## 2021-06-07 RX ORDER — CIPROFLOXACIN 500 MG/1
500 TABLET ORAL 2 TIMES DAILY
Qty: 20 TABLET | Refills: 0 | Status: SHIPPED | OUTPATIENT
Start: 2021-06-07 | End: 2021-06-17 | Stop reason: ALTCHOICE

## 2021-06-07 NOTE — PROGRESS NOTES
Zulema Copeland is a 63 y.o. female, evaluated via audio-only technology on 6/7/2021 for Hospital Follow Up  . Assessment & Plan: The patient is a 63-year-old female who is seen for evaluation today. She went to the emergency room in our hospital told she had a stomach bug and then left and went to [facility] where they told her she had a urinary tract infection and she was hospitalized for 4 days. She was sent home on amoxicillin. I cannot find any old cultures in her chart she has had no fever she usually takes Cipro. She is status post renal transplant x2. No chest pain or shortness of breath. No rashes. This was a 20-minute visit with voice to voice communication I was in my office the patient was at her home       12  Subjective: The patient is a 63-year-old female who is seen today for evaluation via telephone to telephone intervention. She was in the hospital recently for 4 days after leaving our emergency room with no diagnosis she has had no falls or injuries she was given amoxicillin for UTI is still having urinary tract symptomatology. No falls or injuries no rash no syncope or loss of consciousness. Bowel movements have been appropriate. Eating relatively well. Nobody at home has been sick no chest pain and no shortness of breath. No rash no syncope no loss of consciousness. Bowel movements have been appropriate. Prior to Admission medications    Medication Sig Start Date End Date Taking? Authorizing Provider   ondansetron (Zofran ODT) 4 mg disintegrating tablet 1 Tablet by SubLINGual route every eight (8) hours as needed for Nausea or Vomiting. 5/25/21  Yes Marito Santos, DO   dicyclomine (BENTYL) 10 mg capsule Take 1 Capsule by mouth four (4) times daily. 5/25/21  Yes Marito Santos, DO   levothyroxine (SYNTHROID) 50 mcg tablet TAKE 1 TABLET BY MOUTH EVERY DAY 5/12/21  Yes Chrissie Hanks MD   famotidine (PEPCID) 20 mg tablet Take 1 Tab by mouth two (2) times a day.  5/6/21  Yes Giorgio Fritz MD LAURA   meclizine (ANTIVERT) 25 mg tablet TAKE 1 TABLET BY MOUTH THREE TIMES DAILY FOR UP TO 10 DAYS AS NEEDED FOR DIZZINESS 4/27/21  Yes Blu Alonzo MD   dicyclomine (BENTYL) 10 mg capsule TAKE 1 CAPSULE BY MOUTH FOUR TIMES DAILY 4/15/21  Yes Blu Alonzo MD   sucralfate (CARAFATE) 1 gram tablet TAKE 1 TABLET BY MOUTH FOUR TIMES DAILY 3/28/21  Yes Blu Alonzo MD   carvediloL (COREG) 6.25 mg tablet Take 6.25 mg by mouth two (2) times daily (with meals). 9/24/20  Yes Provider, Historical   fluticasone propion-salmeteroL (Advair Diskus) 250-50 mcg/dose diskus inhaler Take 1 Puff by inhalation every twelve (12) hours. Yes Provider, Historical   valGANciclovir (VALCYTE) 450 mg tablet Take 450 mg by mouth daily. Yes Provider, Historical   fluticasone propion-salmeteroL (ADVAIR/WIXELA) 250-50 mcg/dose diskus inhaler Take 1 Puff by inhalation every twelve (12) hours. 3/2/21  Yes Blu Alonzo MD   carvediloL (COREG) 6.25 mg tablet Take 1 Tab by mouth two (2) times daily (with meals). 3/2/21  Yes Blu Alonzo MD   valGANciclovir (VALCYTE) 450 mg tablet Take 2 Tabs by mouth daily. 3/2/21  Yes Blu Alonzo MD   simvastatin (ZOCOR) 20 mg tablet TAKE 1 TABLET BY MOUTH DAILY AS DIRECTED. 2/19/21  Yes Blu Alonzo MD   albuterol (PROVENTIL VENTOLIN) 2.5 mg /3 mL (0.083 %) nebu USE 1 VIAL VIA NEBULIZER THREE TIMES DAILY 2/8/21  Yes Blu Alonzo MD   amiodarone (CORDARONE) 200 mg tablet TAKE 1 TABLET BY MOUTH EVERY DAY 1/13/21  Yes Blu Alonzo MD   warfarin (COUMADIN) 2 mg tablet Take 3 Tabs by mouth daily. Indications: blood clot in a deep vein of the extremities 12/29/20  Yes Aylin Arshad NP   Dexilant 60 mg CpDB capsule (delayed release) TAKE ONE CAPSULE BY MOUTH EVERY DAY 12/22/20  Yes Blu Alonzo MD   ondansetron (ZOFRAN ODT) 8 mg disintegrating tablet 1 p.o. every 6 to 8 hours for nausea and vomiting this does not need to be a tablet that orally dissolves.  10/9/20  Yes Chris Villanueva MD   amLODIPine (NORVASC) 10 mg tablet TAKE 1 TABLET BY MOUTH EVERY DAY  Patient taking differently: 5 mg daily. Indications: high blood pressure 9/22/20  Yes Chris Villanueva MD   omeprazole (PRILOSEC) 20 mg capsule TAKE ONE CAPSULE BY MOUTH TWICE DAILY 9/22/20  Yes Chris Villanueva MD   metoprolol tartrate (LOPRESSOR) 50 mg tablet Take 50 mg by mouth daily. 2/22/19  Yes Other, MD Abelardo   aluminum & magnesium hydroxide-simethicone (Maalox Maximum Strength) 400-400-40 mg/5 mL suspension Take 10 mL by mouth every six (6) hours as needed for Indigestion. 9/17/20  Yes Toshia Galicia MD   ESTRACE 0.01 % (0.1 mg/gram) vaginal cream INSERT 2 GRAMS INTO VAGINA EVERY MONDAY AND THURSDAY 4/11/17  Yes Beendicto Landry MD   cranberry extract 425 mg cap 425 mg. 1/17/14  Yes Provider, Historical   LORazepam (ATIVAN) 0.5 mg tablet 0.5 mg.   Yes Provider, Historical   fluticasone (FLONASE) 50 mcg/actuation nasal spray 1 Spray. Yes Provider, Historical   EPINEPHrine (EPIPEN) 0.3 mg/0.3 mL injection 0.3 mg. 10/1/16  Yes Provider, Historical   hyoscyamine SL (LEVSIN/SL) 0.125 mg SL tablet 0.125 mg by SubLINGual route every four (4) hours as needed. Yes Provider, Historical   fluticasone-salmeterol (ADVAIR DISKUS) 250-50 mcg/dose diskus inhaler Take 1 Puff by inhalation every twelve (12) hours. Yes Provider, Historical   montelukast (SINGULAIR) 10 mg tablet Take 10 mg by mouth daily. Yes Provider, Historical   raNITIdine hcl 300 mg cap Take 300 mg by mouth daily (after breakfast). Patient not taking: Reported on 5/19/2021 5/6/21   Chris Villanueva MD   sertraline (ZOLOFT) 50 mg tablet Take 1 Tab by mouth daily.   Patient not taking: Reported on 5/19/2021 2/10/21   Chris Villanueva MD     Patient Active Problem List   Diagnosis Code    History of kidney transplant Z94.0    CMV (cytomegalovirus) antibody positive R76.8    Hematuria R31.9    Fecal incontinence R15.9    Drug-induced hyperglycemia R73.9, T50.905A    Diverticulitis of intestine K57.92    Anemia D64.9    Acute renal failure (HCC) N17.9    Asthma J45.909    Exacerbation of asthma J45. 0    Chronic kidney disease, stage III (moderate) ([facility]) N18.30    Chronic obstructive pulmonary disease ([facility]) J44.9    Cystic disease of kidney Q61.9    Diverticular disease of large intestine K57.30    Essential hypertension I10    Fever R50.9    Gastroenteritis K52.9    Gastroesophageal reflux disease K21.9    Seasonal allergic rhinitis due to pollen J30.1    Hyperlipidemia E78.5    Hyperkalemia E87.5    Disease due to gram-negative bacillus B96.89    Kidney transplant rejection T86.11    Migraine without status migrainosus, not intractable G43.909    Nausea and vomiting R11.2    Adiposity E66.9    Pruritus L29.9    Recurrent urinary tract infection N39.0    UTI (urinary tract infection) N39.0    Renal transplant disorder T86.10    Systemic infection ([facility]) A41.9    Systemic inflammatory response syndrome (SIRS) ([facility]) R65.10    Ulcer [id number]    Renal cyst N28.1    Obesity E66.9    Migraine G43.909    Hypertension I10    Hypercholesteremia E78.00    GERD (gastroesophageal reflux disease) K21.9    Burning with urination R30.0    Arrhythmia I49.9    Acute recurrent maxillary sinusitis J01.01    Calculus of gallbladder with acute cholecystitis without obstruction K80.00    Hypothyroidism E03.9    Vertigo R42    ESRD (end stage renal disease) on dialysis ([facility]) N18.6, Z99.2    History of DVT (deep vein thrombosis) Z86.718    Stomatitis K12.1    Thrush of mouth and esophagus ([facility]) B37.81, B37.0    Encounter for cholecystectomy Z76.89    Acute left-sided low back pain without sciatica M54.5    Atrial fibrillation ([facility]) I48.91    Chronic anticoagulation Z79.01       Review of Systems   Constitutional: Negative. Eyes: Negative. Cardiovascular: Negative.     Genitourinary: Positive for dysuria, frequency and urgency. Skin: Negative. Neurological: Negative. Endo/Heme/Allergies: Negative. Psychiatric/Behavioral: Negative. Patient-Reported Vitals 8/6/2020   Patient-Reported Weight 150   Patient-Reported Height 6'9\"        Alf Bernal, who was evaluated through a patient-initiated, synchronous (real-time) audio only encounter, and/or her healthcare decision maker, is aware that it is a billable service, with coverage as determined by her insurance carrier. She provided verbal consent to proceed: n/a- consent obtained within past 12 months. She has not had a related appointment within my department in the past 7 days or scheduled within the next 24 hours.       Total Time: minutes: 11-20 minutes    Nathaly Mcqueen MD

## 2021-06-07 NOTE — PROGRESS NOTES
Margaux Zapata presents today for   Chief Complaint   Patient presents with   [facility] Follow Up       Virtual/telephone visit    Depression Screening:  3 most recent PHQ Screens 6/7/2021   Little interest or pleasure in doing things Several days   Feeling down, depressed, irritable, or hopeless Several days   Total Score PHQ 2 2       Learning Assessment:  Learning Assessment 10/9/2020   PRIMARY LEARNER Patient   HIGHEST LEVEL OF EDUCATION - PRIMARY LEARNER  DID NOT GRADUATE HIGH SCHOOL   BARRIERS PRIMARY LEARNER NONE   PRIMARY LANGUAGE ENGLISH   LEARNER PREFERENCE PRIMARY READING   ANSWERED BY patient   RELATIONSHIP SELF       Fall Risk  Fall Risk Assessment, last 12 mths 12/24/2020   Able to walk? Yes   Fall in past 12 months? 0   Do you feel unsteady? 0   Are you worried about falling 0   Number of falls in past 12 months -   Fall with injury? -       ADL  ADL Assessment 12/24/2020   Feeding yourself No Help Needed   Getting from bed to chair No Help Needed   Getting dressed No Help Needed   Bathing or showering No Help Needed   Walk across the room (includes cane/walker) No Help Needed   Using the telphone No Help Needed   Taking your medications No Help Needed   Preparing meals No Help Needed   Managing money (expenses/bills) No Help Needed   Moderately strenuous housework (laundry) No Help Needed   Shopping for personal items (toiletries/medicines) No Help Needed   Shopping for groceries No Help Needed   Driving No Help Needed   Climbing a flight of stairs No Help Needed   Getting to places beyond walking distances No Help Needed       Travel Screening:    Travel Screening     Question   Response    In the last month, have you been in contact with someone who was confirmed or suspected to have Coronavirus / COVID-19? No / Unsure    Have you had a COVID-19 viral test in the last 14 days? No    Do you have any of the following new or worsening symptoms?   None of these    Have you traveled internationally or domestically in the last month? No      Travel History   Travel since 05/07/21     No documented travel since 05/07/21          Health Maintenance reviewed and discussed and ordered per Provider. Health Maintenance Due   Topic Date Due    Hepatitis C Screening  Never done    COVID-19 Vaccine (1) Never done    DTaP/Tdap/Td series (1 - Tdap) Never done    PAP AKA CERVICAL CYTOLOGY  Never done    Shingrix Vaccine Age 50> (1 of 2) Never done    Pneumococcal 0-64 years (3 of 4 - PCV13) 11/13/2017    Medicare Yearly Exam  Never done   . Coordination of Care:  1. Have you been to the ER, urgent care clinic since your last visit? Hospitalized since your last visit? yes    2. Have you seen or consulted any other health care providers outside of the [address] [place] [address] since your last visit? Include any pap smears or colon screening.  no

## 2021-06-10 ENCOUNTER — TELEPHONE (OUTPATIENT)
Dept: FAMILY MEDICINE CLINIC | Age: 66
End: 2021-06-10

## 2021-06-10 ENCOUNTER — PATIENT OUTREACH (OUTPATIENT)
Dept: CASE MANAGEMENT | Age: 66
End: 2021-06-10

## 2021-06-10 NOTE — LETTER
Dear Saad Tovar My name is Gina Franks and I am a Care Transition Nurse who partners with (practice or provider Lon Chavez MD) to improve patients' health. I've been trying to reach you via phone to let you know that, as a member of your care team, I will work with other providers involved in your care, offer education for your specific health conditions, and connect you with more resources as needed. This program is designed to provide you with the opportunity to have a ([address] [facility]/[address]) care manager partner with you for the following situations: 
 
 1) if you come home from the hospital or emergency room 2) to help manage your disease 3) when you would like assistance coordinating services or appointments This added support is provided at no additional cost to you. My primary focus is to help you achieve specific goals and improve your health. Please call me at [phone] to further discuss how I can support your health care needs. Sincerely, Gina Franks BSN, RN Care Transitions Nurse [phone]

## 2021-06-10 NOTE — PROGRESS NOTES
6/10/2021   2:30 PM  Care Transitions Initial Call    Call within 2 business days of discharge: Yes     Patient: Ama Buchanan Patient : 1955 MRN: [id number]    Hospital Problem List  Sepsis [facility])   Pyelonephritis      Was this an external facility discharge? Yes, 21 Discharge Facility: [facility]  Call placed to patient at both numbers. Attempted to reach patient for [facility] follow up. No answer and there was no way to leave a message because home phone voice mail full. [address] follow up appointment(s):   Future Appointments   Date Time Provider Elena Coello   2021  2:15 PM Blaine Levy MD [facility]     Unable to reach patient x 3 attempts. Letter sent and episode closed. Goals Addressed                 This Visit's Progress     COMPLETED: Prevent complications post hospitalization. 1. CTN will monitor X 4 weeks    2. Ensure provider appt is scheduled within 7 days post-discharge  6/3/2021 PCP follow up 21  3. Confirm patient attended post-discharge provider apt    4. Complete post-visit call to confirm attendance and update care needs      5. Review/educate common or potential \"red flags\" of condition worsening  6/3/2021  Confusion   Fast breathing   Chills or shaking   Fever or low body temperature   Fast heart beat   Lightheadedness   Less urine output   Skin rash or skin color changes   6. Evaluate adherence to medications and priority barriers to resolve        7. Instruct on adherence to medications as ordered and assess for therapeutic response and side-effects         8. Discuss and evaluate ADL performance. Provide recommendations on energy conservation, particularly related to transition home from an inpatient admission.

## 2021-06-10 NOTE — TELEPHONE ENCOUNTER
Patient was seen on 6/7/21 for a Virtual visit. She would like for Dr. Valerie Hill to call her some medication in for her mouth.  (Food burns her mouth)

## 2021-06-12 ENCOUNTER — APPOINTMENT (OUTPATIENT)
Dept: CT IMAGING | Age: 66
End: 2021-06-12
Attending: INTERNAL MEDICINE
Payer: MEDICARE

## 2021-06-12 ENCOUNTER — HOSPITAL ENCOUNTER (OUTPATIENT)
Age: 66
Setting detail: OBSERVATION
Discharge: HOME HEALTH | End: 2021-06-13
Attending: INTERNAL MEDICINE | Admitting: INTERNAL MEDICINE
Payer: MEDICARE

## 2021-06-12 DIAGNOSIS — N18.6 ESRD (END STAGE RENAL DISEASE) ON DIALYSIS (HCC): ICD-10-CM

## 2021-06-12 DIAGNOSIS — Z99.2 ESRD (END STAGE RENAL DISEASE) ON DIALYSIS (HCC): ICD-10-CM

## 2021-06-12 DIAGNOSIS — R19.7 DIARRHEA, UNSPECIFIED TYPE: ICD-10-CM

## 2021-06-12 DIAGNOSIS — E87.5 ACUTE HYPERKALEMIA: Primary | ICD-10-CM

## 2021-06-12 LAB
ALBUMIN SERPL-MCNC: 3 G/DL (ref 3.5–4.7)
ALBUMIN SERPL-MCNC: 3.4 G/DL (ref 3.5–4.7)
ALBUMIN/GLOB SERPL: 0.9 {RATIO}
ALBUMIN/GLOB SERPL: 1.1 {RATIO}
ALP SERPL-CCNC: 81 U/L (ref 38–126)
ALP SERPL-CCNC: 85 U/L (ref 38–126)
ALT SERPL-CCNC: 23 U/L (ref 3–52)
ALT SERPL-CCNC: 33 U/L (ref 3–52)
ANION GAP SERPL CALC-SCNC: 15 MMOL/L
ANION GAP SERPL CALC-SCNC: 9 MMOL/L
APPEARANCE UR: CLEAR
AST SERPL W P-5'-P-CCNC: 36 U/L (ref 14–74)
AST SERPL W P-5'-P-CCNC: 41 U/L (ref 14–74)
BACTERIA URNS QL MICRO: ABNORMAL /HPF
BASOPHILS # BLD: 0 K/UL (ref 0–0.1)
BASOPHILS NFR BLD: 0 % (ref 0–2)
BILIRUB DIRECT SERPL-MCNC: 0.1 MG/DL (ref 0–0.3)
BILIRUB DIRECT SERPL-MCNC: 0.3 MG/DL (ref 0–0.3)
BILIRUB SERPL-MCNC: 0.6 MG/DL (ref 0.2–1)
BILIRUB SERPL-MCNC: 0.8 MG/DL (ref 0.2–1)
BILIRUB UR QL: NEGATIVE
BUN SERPL-MCNC: 68 MG/DL (ref 9–21)
BUN SERPL-MCNC: 68 MG/DL (ref 9–21)
BUN/CREAT SERPL: 7
BUN/CREAT SERPL: 7
CA-I BLD-MCNC: 7.9 MG/DL (ref 8.5–10.5)
CA-I BLD-MCNC: 8 MG/DL (ref 8.5–10.5)
CHLORIDE SERPL-SCNC: 108 MMOL/L (ref 94–111)
CHLORIDE SERPL-SCNC: 99 MMOL/L (ref 94–111)
CO2 SERPL-SCNC: 15 MMOL/L (ref 21–33)
CO2 SERPL-SCNC: 17 MMOL/L (ref 21–33)
COLLECT DATE STL: NEGATIVE
COLOR UR: ABNORMAL
CREAT SERPL-MCNC: 10.1 MG/DL (ref 0.7–1.2)
CREAT SERPL-MCNC: 10.3 MG/DL (ref 0.7–1.2)
EOSINOPHIL # BLD: 0 K/UL (ref 0–0.4)
EOSINOPHIL NFR BLD: 0 % (ref 0–5)
EPITH CASTS URNS QL MICRO: ABNORMAL /LPF (ref 0–20)
ERYTHROCYTE [DISTWIDTH] IN BLOOD BY AUTOMATED COUNT: 16 % (ref 11.6–14.5)
GLOBULIN SER CALC-MCNC: 3.2 G/DL
GLOBULIN SER CALC-MCNC: 3.2 G/DL
GLUCOSE SERPL-MCNC: 104 MG/DL (ref 70–110)
GLUCOSE SERPL-MCNC: 155 MG/DL (ref 70–110)
GLUCOSE UR STRIP.AUTO-MCNC: NEGATIVE MG/DL
HCT VFR BLD AUTO: 24.5 % (ref 35–45)
HEMOCCULT SP1 STL QL: NEGATIVE
HGB BLD-MCNC: 7.8 G/DL (ref 12–16)
HGB UR QL STRIP: ABNORMAL
IMM GRANULOCYTES # BLD AUTO: 0 K/UL
IMM GRANULOCYTES NFR BLD AUTO: 0 %
INR PPP: 1.5 (ref 0.8–1.2)
KETONES UR QL STRIP.AUTO: NEGATIVE MG/DL
LACTATE SERPL-SCNC: 1.5 MMOL/L (ref 0.5–2)
LEUKOCYTE ESTERASE UR QL STRIP.AUTO: NEGATIVE
LIPASE SERPL-CCNC: 86 U/L (ref 10–57)
LIPASE SERPL-CCNC: 92 U/L (ref 10–57)
LYMPHOCYTES # BLD: 2.4 K/UL (ref 0.9–3.6)
LYMPHOCYTES NFR BLD: 29 % (ref 21–52)
MAGNESIUM SERPL-MCNC: 2 MG/DL (ref 1.7–2.8)
MAGNESIUM SERPL-MCNC: 2.2 MG/DL (ref 1.7–2.8)
MCH RBC QN AUTO: 33.8 PG (ref 24–34)
MCHC RBC AUTO-ENTMCNC: 31.8 G/DL (ref 31–37)
MCV RBC AUTO: 106.1 FL (ref 74–97)
MONOCYTES # BLD: 0.1 K/UL (ref 0.05–1.2)
MONOCYTES NFR BLD: 1 % (ref 3–10)
NEUTS SEG # BLD: 5.6 K/UL (ref 1.8–8)
NEUTS SEG NFR BLD: 70 % (ref 40–73)
NITRITE UR QL STRIP.AUTO: NEGATIVE
PH UR STRIP: 7 [PH] (ref 5–9)
PLATELET # BLD AUTO: 388 K/UL (ref 135–420)
PMV BLD AUTO: 9.6 FL (ref 9.2–11.8)
POTASSIUM SERPL-SCNC: 7.4 MMOL/L (ref 3.2–5.1)
POTASSIUM SERPL-SCNC: 8 MMOL/L (ref 3.2–5.1)
PROCALCITONIN SERPL-MCNC: 0.45 NG/ML (ref 0.5–2)
PROT SERPL-MCNC: 6.2 G/DL (ref 6.1–8.4)
PROT SERPL-MCNC: 6.6 G/DL (ref 6.1–8.4)
PROT UR STRIP-MCNC: 100 MG/DL
PROTHROMBIN TIME: 17.2 SEC (ref 11.5–15.2)
RBC # BLD AUTO: 2.31 M/UL (ref 4.2–5.3)
RBC #/AREA URNS HPF: ABNORMAL /HPF (ref 0–2)
SODIUM SERPL-SCNC: 129 MMOL/L (ref 135–145)
SODIUM SERPL-SCNC: 134 MMOL/L (ref 135–145)
SP GR UR REFRACTOMETRY: 1.01 (ref 1–1.03)
UROBILINOGEN UR QL STRIP.AUTO: 0.2 EU/DL (ref 0.2–1)
WBC # BLD AUTO: 8.1 K/UL (ref 4.6–13.2)
WBC URNS QL MICRO: ABNORMAL /HPF (ref 0–4)

## 2021-06-12 PROCEDURE — 36600 WITHDRAWAL OF ARTERIAL BLOOD: CPT

## 2021-06-12 PROCEDURE — 96375 TX/PRO/DX INJ NEW DRUG ADDON: CPT

## 2021-06-12 PROCEDURE — 83690 ASSAY OF LIPASE: CPT

## 2021-06-12 PROCEDURE — 80076 HEPATIC FUNCTION PANEL: CPT

## 2021-06-12 PROCEDURE — 74176 CT ABD & PELVIS W/O CONTRAST: CPT

## 2021-06-12 PROCEDURE — 85025 COMPLETE CBC W/AUTO DIFF WBC: CPT

## 2021-06-12 PROCEDURE — 74011250636 HC RX REV CODE- 250/636: Performed by: NURSE PRACTITIONER

## 2021-06-12 PROCEDURE — 65270000029 HC RM PRIVATE

## 2021-06-12 PROCEDURE — 89055 LEUKOCYTE ASSESSMENT FECAL: CPT

## 2021-06-12 PROCEDURE — 74011250637 HC RX REV CODE- 250/637: Performed by: NURSE PRACTITIONER

## 2021-06-12 PROCEDURE — 65610000006 HC RM INTENSIVE CARE

## 2021-06-12 PROCEDURE — 87449 NOS EACH ORGANISM AG IA: CPT

## 2021-06-12 PROCEDURE — 74011250637 HC RX REV CODE- 250/637: Performed by: INTERNAL MEDICINE

## 2021-06-12 PROCEDURE — 99218 HC RM OBSERVATION: CPT

## 2021-06-12 PROCEDURE — 74011250636 HC RX REV CODE- 250/636: Performed by: FAMILY MEDICINE

## 2021-06-12 PROCEDURE — 85610 PROTHROMBIN TIME: CPT

## 2021-06-12 PROCEDURE — 84145 PROCALCITONIN (PCT): CPT

## 2021-06-12 PROCEDURE — 74011000258 HC RX REV CODE- 258: Performed by: FAMILY MEDICINE

## 2021-06-12 PROCEDURE — 99285 EMERGENCY DEPT VISIT HI MDM: CPT

## 2021-06-12 PROCEDURE — 93005 ELECTROCARDIOGRAM TRACING: CPT

## 2021-06-12 PROCEDURE — 96374 THER/PROPH/DIAG INJ IV PUSH: CPT

## 2021-06-12 PROCEDURE — 83605 ASSAY OF LACTIC ACID: CPT

## 2021-06-12 PROCEDURE — 83735 ASSAY OF MAGNESIUM: CPT

## 2021-06-12 PROCEDURE — 96376 TX/PRO/DX INJ SAME DRUG ADON: CPT

## 2021-06-12 PROCEDURE — 90935 HEMODIALYSIS ONE EVALUATION: CPT

## 2021-06-12 PROCEDURE — 80048 BASIC METABOLIC PNL TOTAL CA: CPT

## 2021-06-12 PROCEDURE — 74011250637 HC RX REV CODE- 250/637: Performed by: FAMILY MEDICINE

## 2021-06-12 PROCEDURE — 81001 URINALYSIS AUTO W/SCOPE: CPT

## 2021-06-12 PROCEDURE — 74011000250 HC RX REV CODE- 250: Performed by: FAMILY MEDICINE

## 2021-06-12 PROCEDURE — 82272 OCCULT BLD FECES 1-3 TESTS: CPT

## 2021-06-12 PROCEDURE — 36415 COLL VENOUS BLD VENIPUNCTURE: CPT

## 2021-06-12 PROCEDURE — 74011636637 HC RX REV CODE- 636/637: Performed by: FAMILY MEDICINE

## 2021-06-12 PROCEDURE — 74011000258 HC RX REV CODE- 258: Performed by: NURSE PRACTITIONER

## 2021-06-12 RX ORDER — FENTANYL CITRATE 50 UG/ML
100 INJECTION, SOLUTION INTRAMUSCULAR; INTRAVENOUS
Status: COMPLETED | OUTPATIENT
Start: 2021-06-12 | End: 2021-06-12

## 2021-06-12 RX ORDER — B,C/FOLIC/ZINC/SELENOMETH/D3/E 0.8-12.5MG
1 TABLET ORAL DAILY
COMMUNITY
Start: 2021-04-15

## 2021-06-12 RX ORDER — SUCRALFATE 1 G/1
1 TABLET ORAL
Status: DISCONTINUED | OUTPATIENT
Start: 2021-06-12 | End: 2021-06-13 | Stop reason: HOSPADM

## 2021-06-12 RX ORDER — ZOLPIDEM TARTRATE 5 MG/1
5 TABLET ORAL
Status: DISCONTINUED | OUTPATIENT
Start: 2021-06-12 | End: 2021-06-13 | Stop reason: HOSPADM

## 2021-06-12 RX ORDER — ATORVASTATIN CALCIUM 10 MG/1
10 TABLET, FILM COATED ORAL DAILY
Status: DISCONTINUED | OUTPATIENT
Start: 2021-06-12 | End: 2021-06-13 | Stop reason: HOSPADM

## 2021-06-12 RX ORDER — VALGANCICLOVIR 450 MG/1
450 TABLET, FILM COATED ORAL DAILY
Status: DISCONTINUED | OUTPATIENT
Start: 2021-06-13 | End: 2021-06-13 | Stop reason: HOSPADM

## 2021-06-12 RX ORDER — SODIUM CHLORIDE 0.9 % (FLUSH) 0.9 %
5-40 SYRINGE (ML) INJECTION EVERY 8 HOURS
Status: DISCONTINUED | OUTPATIENT
Start: 2021-06-12 | End: 2021-06-13 | Stop reason: HOSPADM

## 2021-06-12 RX ORDER — ONDANSETRON 2 MG/ML
4 INJECTION INTRAMUSCULAR; INTRAVENOUS ONCE
Status: COMPLETED | OUTPATIENT
Start: 2021-06-12 | End: 2021-06-12

## 2021-06-12 RX ORDER — SIMVASTATIN 20 MG/1
20 TABLET, FILM COATED ORAL
Status: DISCONTINUED | OUTPATIENT
Start: 2021-06-12 | End: 2021-06-12

## 2021-06-12 RX ORDER — CARVEDILOL 6.25 MG/1
6.25 TABLET ORAL 2 TIMES DAILY WITH MEALS
Status: DISCONTINUED | OUTPATIENT
Start: 2021-06-12 | End: 2021-06-13 | Stop reason: HOSPADM

## 2021-06-12 RX ORDER — MONTELUKAST SODIUM 10 MG/1
10 TABLET ORAL DAILY
Status: DISCONTINUED | OUTPATIENT
Start: 2021-06-13 | End: 2021-06-13 | Stop reason: HOSPADM

## 2021-06-12 RX ORDER — PANTOPRAZOLE SODIUM 40 MG/1
40 TABLET, DELAYED RELEASE ORAL DAILY
Status: DISCONTINUED | OUTPATIENT
Start: 2021-06-13 | End: 2021-06-13 | Stop reason: HOSPADM

## 2021-06-12 RX ORDER — ZOLPIDEM TARTRATE 5 MG/1
5 TABLET ORAL
COMMUNITY
Start: 2021-04-05 | End: 2022-05-19 | Stop reason: SDUPTHER

## 2021-06-12 RX ORDER — DEXLANSOPRAZOLE 60 MG/1
60 CAPSULE, DELAYED RELEASE ORAL DAILY
Status: DISCONTINUED | OUTPATIENT
Start: 2021-06-13 | End: 2021-06-12

## 2021-06-12 RX ORDER — LEVOFLOXACIN 500 MG/1
500 TABLET, FILM COATED ORAL 2 TIMES DAILY
Status: DISCONTINUED | OUTPATIENT
Start: 2021-06-12 | End: 2021-06-13

## 2021-06-12 RX ORDER — HYDROMORPHONE HYDROCHLORIDE 1 MG/ML
0.5 INJECTION, SOLUTION INTRAMUSCULAR; INTRAVENOUS; SUBCUTANEOUS ONCE
Status: COMPLETED | OUTPATIENT
Start: 2021-06-12 | End: 2021-06-13

## 2021-06-12 RX ORDER — SERTRALINE HYDROCHLORIDE 50 MG/1
50 TABLET, FILM COATED ORAL DAILY
Status: DISCONTINUED | OUTPATIENT
Start: 2021-06-13 | End: 2021-06-13 | Stop reason: HOSPADM

## 2021-06-12 RX ORDER — SEVELAMER CARBONATE 800 MG/1
800 TABLET, FILM COATED ORAL 3 TIMES DAILY
Status: DISCONTINUED | OUTPATIENT
Start: 2021-06-12 | End: 2021-06-13 | Stop reason: HOSPADM

## 2021-06-12 RX ORDER — SENNOSIDES 8.6 MG/1
1 TABLET ORAL DAILY PRN
Status: DISCONTINUED | OUTPATIENT
Start: 2021-06-12 | End: 2021-06-13 | Stop reason: HOSPADM

## 2021-06-12 RX ORDER — PROMETHAZINE HYDROCHLORIDE 25 MG/1
12.5 TABLET ORAL
Status: DISCONTINUED | OUTPATIENT
Start: 2021-06-12 | End: 2021-06-12

## 2021-06-12 RX ORDER — SEVELAMER CARBONATE 800 MG/1
800 TABLET, FILM COATED ORAL 3 TIMES DAILY
COMMUNITY
Start: 2021-06-08

## 2021-06-12 RX ORDER — ACETAMINOPHEN 325 MG/1
650 TABLET ORAL
Status: DISCONTINUED | OUTPATIENT
Start: 2021-06-12 | End: 2021-06-13 | Stop reason: HOSPADM

## 2021-06-12 RX ORDER — OXYCODONE AND ACETAMINOPHEN 5; 325 MG/1; MG/1
1 TABLET ORAL
COMMUNITY
Start: 2021-06-02 | End: 2021-06-25

## 2021-06-12 RX ORDER — DEXTROSE 50 % IN WATER (D50W) INTRAVENOUS SYRINGE
25
Status: COMPLETED | OUTPATIENT
Start: 2021-06-12 | End: 2021-06-12

## 2021-06-12 RX ORDER — FAMOTIDINE 20 MG/1
20 TABLET, FILM COATED ORAL 2 TIMES DAILY
Status: DISCONTINUED | OUTPATIENT
Start: 2021-06-12 | End: 2021-06-13 | Stop reason: HOSPADM

## 2021-06-12 RX ORDER — SODIUM POLYSTYRENE SULFONATE 15 G/60ML
15 SUSPENSION ORAL; RECTAL
Status: COMPLETED | OUTPATIENT
Start: 2021-06-12 | End: 2021-06-12

## 2021-06-12 RX ORDER — AMIODARONE HYDROCHLORIDE 200 MG/1
200 TABLET ORAL DAILY
Status: DISCONTINUED | OUTPATIENT
Start: 2021-06-13 | End: 2021-06-13 | Stop reason: HOSPADM

## 2021-06-12 RX ORDER — BUDESONIDE AND FORMOTEROL FUMARATE DIHYDRATE 160; 4.5 UG/1; UG/1
1 AEROSOL RESPIRATORY (INHALATION)
Status: DISCONTINUED | OUTPATIENT
Start: 2021-06-13 | End: 2021-06-13 | Stop reason: HOSPADM

## 2021-06-12 RX ORDER — FLUTICASONE PROPIONATE 50 MCG
1 SPRAY, SUSPENSION (ML) NASAL DAILY
Status: DISCONTINUED | OUTPATIENT
Start: 2021-06-13 | End: 2021-06-13 | Stop reason: HOSPADM

## 2021-06-12 RX ORDER — LEVOTHYROXINE SODIUM 50 UG/1
50 TABLET ORAL
Status: DISCONTINUED | OUTPATIENT
Start: 2021-06-13 | End: 2021-06-13 | Stop reason: HOSPADM

## 2021-06-12 RX ORDER — AMLODIPINE BESYLATE 5 MG/1
5 TABLET ORAL DAILY
Status: DISCONTINUED | OUTPATIENT
Start: 2021-06-13 | End: 2021-06-13 | Stop reason: HOSPADM

## 2021-06-12 RX ADMIN — PROMETHAZINE HYDROCHLORIDE 12.5 MG: 25 TABLET ORAL at 16:46

## 2021-06-12 RX ADMIN — ONDANSETRON 4 MG: 2 INJECTION INTRAMUSCULAR; INTRAVENOUS at 08:39

## 2021-06-12 RX ADMIN — ATORVASTATIN CALCIUM 10 MG: 10 TABLET, FILM COATED ORAL at 22:04

## 2021-06-12 RX ADMIN — PROMETHAZINE HYDROCHLORIDE 12.5 MG: 25 INJECTION INTRAMUSCULAR; INTRAVENOUS at 17:39

## 2021-06-12 RX ADMIN — INSULIN HUMAN 10 UNITS: 100 INJECTION, SOLUTION PARENTERAL at 11:48

## 2021-06-12 RX ADMIN — Medication 10 ML: at 16:06

## 2021-06-12 RX ADMIN — Medication 10 ML: at 23:09

## 2021-06-12 RX ADMIN — FENTANYL CITRATE 100 MCG: 50 INJECTION, SOLUTION INTRAMUSCULAR; INTRAVENOUS at 08:40

## 2021-06-12 RX ADMIN — FENTANYL CITRATE 100 MCG: 50 INJECTION, SOLUTION INTRAMUSCULAR; INTRAVENOUS at 16:06

## 2021-06-12 RX ADMIN — SUCRALFATE 1 G: 1 TABLET ORAL at 22:06

## 2021-06-12 RX ADMIN — DEXTROSE MONOHYDRATE 25 G: 500 INJECTION PARENTERAL at 11:49

## 2021-06-12 RX ADMIN — SODIUM POLYSTYRENE SULFONATE 15 G: 15 SUSPENSION ORAL; RECTAL at 11:56

## 2021-06-12 RX ADMIN — SEVELAMER CARBONATE 800 MG: 800 TABLET, FILM COATED ORAL at 22:06

## 2021-06-12 RX ADMIN — CALCIUM GLUCONATE 1 G: 98 INJECTION, SOLUTION INTRAVENOUS at 11:55

## 2021-06-12 NOTE — ED PROVIDER NOTES
EMERGENCY DEPARTMENT HISTORY AND PHYSICAL EXAM      Date: 6/12/2021  Patient Name: Mahsa Napier      History of Presenting Illness     Chief Complaint   Patient presents with    Diarrhea    Vomiting       History Provided By: Patient    HPI: Mahsa Napier, 63 y.o. female with a past medical history significant for renal transplant in '02 with rejection; currently on MWF dialysis for past 2 yrs; COPD; HTN; HLD; AFib on Coumadin; h/o DVT; CMV Ab+; GI ulcer; p/o gallbladder; hypothyroid that presents to the ED with cc of vomiting and diarrhea. Pt was admitted to [facility] 5/28/21- 6/2/21 for vomiting and diarrhea and dx with UTI and GE. Increased trop. Thought to have infection from old transplant. C diff neg; Covid neg. Discharged on Amoxil which made her sick; so Dr Angle Quinonez changed her to Cipro 2 days ago. States that she has diarrhea every 30 min; nonbloody and intermittent n/v. She denies fever; chills. Pt states that she was discharged dispite continuing to have diarrhea. Didn't go to dialysis yesterday because she felt weak. ALLERGY: ASA; Bactrim; Cefepime; Ceftriaxome; Ibuprofen; Morphine; Rifampin; Vancomycin  . Meds: zofran; bentyl; synthroid; pepcid; Antivert; Carafate; Coreg; Vangancyclovir; coreg; zocor; albuterol; coumadin; dexilant; norvasc; prilosec; metoprolol; ativan; zoloft; zantac. renvela    TChere are no other complaints, changes, or physical findings at this time. PCP: Renlado Alarcon MD    Current Facility-Administered Medications   Medication Dose Route Frequency Provider Last Rate Last Admin    calcium gluconate 1 g in 0.9% sodium chloride 100 mL IVPB  1 g IntraVENous Stuart Michel  mL/hr at 06/12/21 1155 1 g at 06/12/21 1155     Current Outpatient Medications   Medication Sig Dispense Refill    RenaPlex-D 800 mcg-12.5 mg -2,000 unit tab Take 1 Tablet by mouth daily.  sevelamer carbonate (RENVELA) 800 mg tab tab Take 800 mg by mouth three (3) times daily.  ciprofloxacin HCl (CIPRO) 500 mg tablet Take 1 Tablet by mouth two (2) times a day for 10 days. 20 Tablet 0    dicyclomine (BENTYL) 10 mg capsule Take 1 Capsule by mouth four (4) times daily. 30 Capsule 0    levothyroxine (SYNTHROID) 50 mcg tablet TAKE 1 TABLET BY MOUTH EVERY DAY 90 Tab 3    raNITIdine hcl 300 mg cap Take 300 mg by mouth daily (after breakfast). 30 Cap 1    famotidine (PEPCID) 20 mg tablet Take 1 Tab by mouth two (2) times a day. 30 Tab 1    dicyclomine (BENTYL) 10 mg capsule TAKE 1 CAPSULE BY MOUTH FOUR TIMES DAILY 120 Cap 1    sucralfate (CARAFATE) 1 gram tablet TAKE 1 TABLET BY MOUTH FOUR TIMES DAILY 360 Tab 0    carvediloL (COREG) 6.25 mg tablet Take 6.25 mg by mouth two (2) times daily (with meals).  fluticasone propion-salmeteroL (Advair Diskus) 250-50 mcg/dose diskus inhaler Take 1 Puff by inhalation every twelve (12) hours.  valGANciclovir (VALCYTE) 450 mg tablet Take 450 mg by mouth daily.  fluticasone propion-salmeteroL (ADVAIR/WIXELA) 250-50 mcg/dose diskus inhaler Take 1 Puff by inhalation every twelve (12) hours. 1 Inhaler 3    carvediloL (COREG) 6.25 mg tablet Take 1 Tab by mouth two (2) times daily (with meals). 180 Tab 2    valGANciclovir (VALCYTE) 450 mg tablet Take 2 Tabs by mouth daily. 90 Tab 5    simvastatin (ZOCOR) 20 mg tablet TAKE 1 TABLET BY MOUTH DAILY AS DIRECTED. 90 Tab 1    sertraline (ZOLOFT) 50 mg tablet Take 1 Tab by mouth daily. 90 Tab 0    amiodarone (CORDARONE) 200 mg tablet TAKE 1 TABLET BY MOUTH EVERY DAY 90 Tab 2    warfarin (COUMADIN) 2 mg tablet Take 3 Tabs by mouth daily. Indications: blood clot in a deep vein of the extremities (Patient taking differently: Take 2 mg by mouth two (2) times a day.  2 tabs  Indications: blood clot in a deep vein of the extremities) 30 Tab 0    Dexilant 60 mg CpDB capsule (delayed release) TAKE ONE CAPSULE BY MOUTH EVERY DAY 30 Cap 5    amLODIPine (NORVASC) 10 mg tablet TAKE 1 TABLET BY MOUTH EVERY DAY (Patient taking differently: 5 mg daily. Indications: high blood pressure) 90 Tab 3    omeprazole (PRILOSEC) 20 mg capsule TAKE ONE CAPSULE BY MOUTH TWICE DAILY 180 Cap 2    metoprolol tartrate (LOPRESSOR) 50 mg tablet Take 50 mg by mouth daily.  ESTRACE 0.01 % (0.1 mg/gram) vaginal cream INSERT 2 GRAMS INTO VAGINA EVERY MONDAY AND THURSDAY 42.5 g 5    cranberry extract 425 mg cap 425 mg.  LORazepam (ATIVAN) 0.5 mg tablet 0.5 mg.      fluticasone (FLONASE) 50 mcg/actuation nasal spray 1 Spray.  EPINEPHrine (EPIPEN) 0.3 mg/0.3 mL injection 0.3 mg.      fluticasone-salmeterol (ADVAIR DISKUS) 250-50 mcg/dose diskus inhaler Take 1 Puff by inhalation every twelve (12) hours.  montelukast (SINGULAIR) 10 mg tablet Take 10 mg by mouth daily.  oxyCODONE-acetaminophen (PERCOCET) 5-325 mg per tablet Take 1 Tablet by mouth every six (6) hours as needed.  zolpidem (AMBIEN) 5 mg tablet Take 5 mg by mouth nightly as needed.  ondansetron (Zofran ODT) 4 mg disintegrating tablet 1 Tablet by SubLINGual route every eight (8) hours as needed for Nausea or Vomiting. 20 Tablet 0    meclizine (ANTIVERT) 25 mg tablet TAKE 1 TABLET BY MOUTH THREE TIMES DAILY FOR UP TO 10 DAYS AS NEEDED FOR DIZZINESS 21 Tab 0    albuterol (PROVENTIL VENTOLIN) 2.5 mg /3 mL (0.083 %) nebu USE 1 VIAL VIA NEBULIZER THREE TIMES DAILY 90 mL 3    ondansetron (ZOFRAN ODT) 8 mg disintegrating tablet 1 p.o. every 6 to 8 hours for nausea and vomiting this does not need to be a tablet that orally dissolves. 8 Tab 2    aluminum & magnesium hydroxide-simethicone (Maalox Maximum Strength) 400-400-40 mg/5 mL suspension Take 10 mL by mouth every six (6) hours as needed for Indigestion. 250 mL 0    hyoscyamine SL (LEVSIN/SL) 0.125 mg SL tablet 0.125 mg by SubLINGual route every four (4) hours as needed.          Past History     Past Medical History:  Past Medical History:   Diagnosis Date    Anemia NEC     Arrhythmia  Asthma     Asthma     Burning with urination     frequent uti    CMV (cytomegalovirus) antibody positive     Dialysis patient ([address]     M/W/F    Dyspepsia and other specified disorders of function of stomach     stomach ulcer    Essential hypertension     GERD (gastroesophageal reflux disease)     Hypercholesteremia     Hypertension     Migraine     Obesity     Renal cyst 2002    kidney transplant     Ulcer        Past Surgical History:  Past Surgical History:   Procedure Laterality Date    ENDOSCOPY, COLON, DIAGNOSTIC      with Dr. Sarah Dubon HX CHOLECYSTECTOMY  02/2021    HX GI      ulcer    HX HEENT      sinus surg    HX RENAL TRANSPLANT      VASCULAR SURGERY PROCEDURE UNLIST      shunt in prep for dialysis       Family History:  Family History   Problem Relation Age of Onset    Colon Polyps Brother     Cancer Mother     Diabetes Father        Social History:  Social History     Tobacco Use    Smoking status: Never Smoker    Smokeless tobacco: Never Used   Vaping Use    Vaping Use: Never used   Substance Use Topics    Alcohol use: No    Drug use: No       Allergies: Allergies   Allergen Reactions    Aspirin Rash and Unknown (comments)    Bactrim [Sulfamethoxazole-Trimethoprim] Rash and Unknown (comments)    Bromfenac Rash and Unknown (comments)    Cefepime Other (comments)     Neurological reaction    Ceftriaxone Rash    Copper Rash    Ibuprofen Rash and Unknown (comments)    Ketorolac Tromethamine Rash and Unknown (comments)    Morphine Rash and Unknown (comments)    Relafen [Nabumetone] Rash and Unknown (comments)    Rifampin Rash and Unknown (comments)    Vancomycin Rash and Unknown (comments)     Pt reports causes itching, takes benadryl prior to use. Pt denies anaphylaxis. Requires benadryl with each vancomycin dose         Review of Systems     Review of Systems   Constitutional: Negative for chills and fever.    HENT: Negative for sore throat and trouble swallowing. Eyes: Negative for visual disturbance. Respiratory: Negative for chest tightness and shortness of breath. Cardiovascular: Negative for chest pain. Gastrointestinal: Positive for abdominal pain, diarrhea, nausea and vomiting. Negative for blood in stool. Genitourinary: Negative for dysuria, flank pain and pelvic pain. Musculoskeletal: Negative for arthralgias and back pain. Skin: Negative for rash and wound. Neurological: Negative for seizures and headaches. Psychiatric/Behavioral: Negative for agitation and behavioral problems. Physical Exam     Physical Exam  Vitals and nursing note reviewed. Constitutional:       General: She is not in acute distress. Appearance: She is not toxic-appearing or diaphoretic. Comments: Chronically ill appearing female in NAD   HENT:      Head: Normocephalic. Mouth/Throat:      Pharynx: Oropharynx is clear. Eyes:      Extraocular Movements: Extraocular movements intact. Pupils: Pupils are equal, round, and reactive to light. Cardiovascular:      Rate and Rhythm: Normal rate and regular rhythm. Pulses: Normal pulses. Heart sounds: Normal heart sounds. Pulmonary:      Effort: Pulmonary effort is normal.      Breath sounds: Normal breath sounds. Abdominal:      Palpations: Abdomen is soft. Tenderness: There is no guarding or rebound. Comments: No tenderness over the transplant   Musculoskeletal:         General: No swelling. Normal range of motion. Cervical back: Neck supple. Right lower leg: No edema. Left lower leg: No edema. Skin:     General: Skin is warm and dry. Capillary Refill: Capillary refill takes less than 2 seconds. Neurological:      Mental Status: She is alert and oriented to person, place, and time.    Psychiatric:         Behavior: Behavior normal.         Lab and Diagnostic Study Results     Labs -     Recent Results (from the past 12 hour(s))   CBC WITH AUTOMATED DIFF    Collection Time: 06/12/21  6:45 AM   Result Value Ref Range    WBC 8.1 4.6 - 13.2 K/uL    RBC 2.31 (L) 4.20 - 5.30 M/uL    HGB 7.8 (L) 12.0 - 16.0 g/dL    HCT 24.5 (L) 35.0 - 45.0 %    .1 (H) 74.0 - 97.0 FL    MCH 33.8 24.0 - 34.0 PG    MCHC 31.8 31.0 - 37.0 g/dL    RDW 16.0 (H) 11.6 - 14.5 %    PLATELET 667 995 - 443 K/uL    MPV 9.6 9.2 - 11.8 FL    NEUTROPHILS 70 40 - 73 %    LYMPHOCYTES 29 21 - 52 %    MONOCYTES 1 (L) 3 - 10 %    EOSINOPHILS 0 0 - 5 %    BASOPHILS 0 0 - 2 %    IMMATURE GRANULOCYTES 0 %    ABS. NEUTROPHILS 5.6 1.8 - 8.0 K/UL    ABS. LYMPHOCYTES 2.4 0.9 - 3.6 K/UL    ABS. MONOCYTES 0.1 0.05 - 1.2 K/UL    ABS. EOSINOPHILS 0.0 0.0 - 0.4 K/UL    ABS. BASOPHILS 0.0 0.0 - 0.1 K/UL    ABS. IMM. GRANS. 0.0 K/UL   PROTHROMBIN TIME + INR    Collection Time: 06/12/21  6:45 AM   Result Value Ref Range    Prothrombin time 17.2 (H) 11.5 - 15.2 sec    INR 1.5 (H) 0.8 - 1.2     METABOLIC PANEL, BASIC    Collection Time: 06/12/21  6:45 AM   Result Value Ref Range    Sodium 129 (L) 135 - 145 mmol/L    Potassium 8.0 (HH) 3.2 - 5.1 mmol/L    Chloride 99 94 - 111 mmol/L    CO2 15 (L) 21 - 33 mmol/L    Anion gap 15 mmol/L    Glucose 155 (H) 70 - 110 mg/dL    BUN 68 (H) 9 - 21 mg/dL    Creatinine 10.10 (H) 0.70 - 1.20 mg/dL    BUN/Creatinine ratio 7      GFR est AA 5 ml/min/1.73m2    GFR est non-AA 4 ml/min/1.73m2    Calcium 8.0 (L) 8.5 - 10.5 mg/dL   HEPATIC FUNCTION PANEL    Collection Time: 06/12/21  6:45 AM   Result Value Ref Range    Protein, total 6.6 6.1 - 8.4 g/dL    Albumin 3.4 (L) 3.5 - 4.7 g/dL    Globulin 3.2 g/dL    A-G Ratio 1.1      Bilirubin, total 0.8 0.2 - 1.0 mg/dL    Bilirubin, direct 0.3 0.0 - 0.3 mg/dL    Alk.  phosphatase 85 38 - 126 U/L    AST (SGOT) 41 14 - 74 U/L    ALT (SGPT) 23 3 - 52 U/L   LIPASE    Collection Time: 06/12/21  6:45 AM   Result Value Ref Range    Lipase 92 (H) 10 - 57 U/L   LACTIC ACID    Collection Time: 06/12/21  6:45 AM   Result Value Ref Range Lactic acid 1.5 0.5 - 2.0 mmol/L   MAGNESIUM    Collection Time: 06/12/21  6:45 AM   Result Value Ref Range    Magnesium 2.2 1.7 - 2.8 mg/dL   URINALYSIS W/ RFLX MICROSCOPIC    Collection Time: 06/12/21  6:45 AM   Result Value Ref Range    Color Yellow/Straw      Appearance Clear Clear      Specific gravity 1.010 1.003 - 1.035      pH (UA) 7.0 5.0 - 9.0      Protein 100 (A) Negative mg/dL    Glucose Negative Negative mg/dL    Ketone Negative Negative mg/dL    Bilirubin Negative Negative      Blood Trace (A) Negative      Urobilinogen 0.2 0.2 - 1.0 EU/dL    Nitrites Negative Negative      Leukocyte Esterase Negative Negative     PROCALCITONIN    Collection Time: 06/12/21  6:45 AM   Result Value Ref Range    Procalcitonin 0.45 (L) 0.5 - 2.0 ng/mL   URINE MICROSCOPIC    Collection Time: 06/12/21  6:45 AM   Result Value Ref Range    WBC 0-4 0 - 4 /hpf    RBC 0-5 0 - 2 /hpf    Epithelial cells Few 0 - 20 /lpf    Bacteria 2+ (A) None /hpf   METABOLIC PANEL, BASIC    Collection Time: 06/12/21 10:15 AM   Result Value Ref Range    Sodium 134 (L) 135 - 145 mmol/L    Potassium 7.4 (HH) 3.2 - 5.1 mmol/L    Chloride 108 94 - 111 mmol/L    CO2 17 (L) 21 - 33 mmol/L    Anion gap 9 mmol/L    Glucose 104 70 - 110 mg/dL    BUN 68 (H) 9 - 21 mg/dL    Creatinine 10.30 (H) 0.70 - 1.20 mg/dL    BUN/Creatinine ratio 7      GFR est AA 5 ml/min/1.73m2    GFR est non-AA 4 ml/min/1.73m2    Calcium 7.9 (L) 8.5 - 10.5 mg/dL   HEPATIC FUNCTION PANEL    Collection Time: 06/12/21 10:15 AM   Result Value Ref Range    Protein, total 6.2 6.1 - 8.4 g/dL    Albumin 3.0 (L) 3.5 - 4.7 g/dL    Globulin 3.2 g/dL    A-G Ratio 0.9      Bilirubin, total 0.6 0.2 - 1.0 mg/dL    Bilirubin, direct 0.1 0.0 - 0.3 mg/dL    Alk.  phosphatase 81 38 - 126 U/L    AST (SGOT) 36 14 - 74 U/L    ALT (SGPT) 33 3 - 52 U/L   LIPASE    Collection Time: 06/12/21 10:15 AM   Result Value Ref Range    Lipase 86 (H) 10 - 57 U/L   MAGNESIUM    Collection Time: 06/12/21 10:15 AM Result Value Ref Range    Magnesium 2.0 1.7 - 2.8 mg/dL       Radiologic Studies -   [unfilled]  CT Results  (Last 48 hours)               06/12/21 0702  CT ABD PELV WO CONT Final result    Impression:          1. No etiology for abdominal pain. No evidence of colitis. 2. Cholecystectomy. 3. Right lower quadrant renal transplant in situ. 4. Moderate descending and sigmoid colonic diverticulosis. Please note: Lack of intravenous contrast enhancement in the abdomen and pelvis   may lead to:   -- decreased sensitivity for detection of solid organ lesions or injury   -- decreased ability to characterize solid organ lesions   -- decreased sensitivity for detection and characterization of vascular   pathology such as vessel dissection or thrombosis, intestinal ischemia, active   hemorrhage or pseudoaneurysm   -- decreased sensitivity for detection of acute inflammatory conditions                       Narrative:  EXAM: CT ABD PELV WO CONT       CLINICAL INDICATION/HISTORY:  abdominal pain and diarrhea r/o colitis. > Additional: None       COMPARISON: 1/8/2021     > Correlative imaging: None. TECHNIQUE: Axial CT imaging of the abdomen and pelvis was performed without   intravenous contrast. Multiplanar reformats were generated. One or more dose   reduction techniques were used on this CT: automated exposure control,   adjustment of the mAs and/or kVp according to patient size, and iterative   reconstruction techniques. The specific techniques used on this CT exam have   been documented in the patient's electronic medical record. Digital imaging and   communications in medicine (DICOM) format image data are available to   nonaffiliated external healthcare facilities or entities on a secure, media   free, reciprocally searchable basis with patient authorization for at least a 12   month period after this study. _______________       FINDINGS:       LOWER CHEST: Essentially clear. LIVER, BILIARY:      > Liver: Unremarkable.     > Biliary: Cholecystectomy. No ductal dilation. SPLEEN: Unremarkable. PANCREAS: Unremarkable. ADRENALS: Unremarkable. KIDNEYS/URETERS/BLADDER: Severe renal atrophy bilaterally involving native   kidneys. There is a right lower quadrant transplant present containing some   subcentimeter hyperdense cysts along with a probable simple cyst in the lower   pole. There is also a small hyperdense cyst arising from the lateral interpolar   native right kidney. There are simple cysts in both native kidneys as well. PELVIC ORGANS: Unremarkable. GASTROINTESTINAL TRACT: Moderate descending and sigmoid colonic diverticulosis. No colonic wall thickening. Specifically, no evidence of colitis. Small bowel   loops are unremarkable. VASCULATURE: Moderate aortoiliac atherosclerosis. LYMPH NODES: No enlarged lymph nodes. OTHER: None. MUSCULOSKELETAL: Degenerative changes in lower lumbar spine and in the hips. Multilevel lower thoracic ankylosis. _______________               CXR Results  (Last 48 hours)    None        EKG -performed at 9:57 AM read 9:58 AM.  Rate 48. Junctional rhythm. Borderline right axis deviation. No ST-T changes. QTc is 464. Medical Decision Making and ED Course   - I am the first and primary provider for this patient AND AM THE PRIMARY PROVIDER OF RECORD. - I reviewed the vital signs, available nursing notes, past medical history, past surgical history, family history and social history. - Initial assessment performed. The patients presenting problems have been discussed, and the staff are in agreement with the care plan formulated and outlined with them. I have encouraged them to ask questions as they arise throughout their visit. Vital Signs-Reviewed the patient's vital signs.     Patient Vitals for the past 12 hrs:   Temp Pulse Resp BP SpO2   06/12/21 0650  (!) 58 16 (!) 124/54 98 %   06/12/21 0615 98.1 °F (36.7 °C) 66 16 (!) 145/61 100 %         Records Reviewed: Nursing Notes    Provider Notes (Medical Decision Making):   Chronic vomiting and diarrhea. On cipro for UTI. Had testing for C Diff. Will repeat C Diff and stool WBC. Labs; CT to r/o colitis      7:25 AM  Discussed and signed out to Dr Rogelio Cleveland    [address] Note    I spoke with Dr. Alva Moody. Speciality: Nephrology. The patient history and physical discussed. All pertinent labs and imaging discussed. The consultant recommends arranging emergent dialysis. Cedrick Kraus MD    1250 - Consult Note    I spoke with CARMEN Pelaez, on for Dr. Junaid Esteves. Speciality: Hospitalist  The patient history and physical discussed. All pertinent labs and imaging discussed. The consultant recommends admission. Cedrick Kraus MD    2651 -patient is a 26-year-old female presented with vomiting and diarrhea. This has been an ongoing problem. Her white count was normal.  CT was negative for colitis. Stool studies been ordered. She has a history of end-stage renal disease and is on dialysis. She is a renal transplant failure. Her potassium was 7.4. She had peaked T waves on the EKG. She was given calcium gluconate, D50, insulin, and Kayexalate. Nephrology was consulted and she will have emergent dialysis today as she missed yesterday. 12:47 PM  I have spent 30 minutes of critical care time involved in lab review, consultations with specialist, family decision-making, and documentation. During this entire length of time I was immediately available to the patient. Critical Care: The reason for providing this level of medical care for this critically ill patient was due a critical illness that impaired one or more vital organ systems such that there was a high probability of imminent or life threatening deterioration in the patients condition.  This care involved high complexity decision making to assess, manipulate, and support vital system functions, to treat this degreee vital organ system failure and to prevent further life threatening deterioration of the patients condition. Consultations:       Consultations:         Procedures and Critical Care         Disposition     Disposition:   Admitted        Diagnosis     Clinical Impression:   1. Acute hyperkalemia    2. ESRD (end stage renal disease) on dialysis ([facility] [address]    3. Diarrhea, unspecified type        Attestations:    Keri Evangelista MD    Please note that this dictation was completed with [employer], the computer voice recognition software. Quite often unanticipated grammatical, syntax, homophones, and other interpretive errors are inadvertently transcribed by the computer software. Please disregard these errors. Please excuse any errors that have escaped final proofreading. Thank you.

## 2021-06-12 NOTE — H&P
History and Physical    Subjective:     Moose Florentino is a 63 y.o. 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 History was obtained from the patient and per chart review. Patient states onset of vomiting and diarrhea was approximately 2 weeks ago. Pt was admitted to [place] [address] 5/28/21- 6/2/21 for vomiting and diarrhea and dx with Sepsis 2/2 pyelonephritis and Gastroenteritis. Also noted with elevated trop at the time, thought to have infection from old transplanted kidney. C diff neg; Covid neg. Discharged on Amoxicillin which made her sick; so Dr Obey Pate changed her to Cipro 2 days ago which is to be taken for 10 days. States that she has diarrhea every 30 min; nonbloody and intermittent n/v. she took imodium prior to ED visit today, which helped slowed the diarrhea a bit,  She denies fever or chills. No chest pain, or shortness of breath, body aches. She complains of diffuse abdominal pain. Pt states that she was discharged dispite continuing to have diarrhea. She stated vomiting got worse yesterday, makes her feel weaker, so, she couldn't go for her dialysis yesterday. ED work-up revealed a K level of 8. Nephrology was contacted for emergent HD and patient will be admitted for acute hyperkalemia, vomiting and diarrhea.     Past Medical History:   Diagnosis Date    Anemia NEC     Arrhythmia     Asthma     Asthma     Burning with urination     frequent uti    CMV (cytomegalovirus) antibody positive     Dialysis patient ([facility] [address]     M/W/F    Dyspepsia and other specified disorders of function of stomach     stomach ulcer    Essential hypertension     GERD (gastroesophageal reflux disease)     Hypercholesteremia     Hypertension     Migraine     Obesity     Renal cyst 2002    kidney transplant     Ulcer       Past Surgical History:   Procedure Laterality Date    ENDOSCOPY, COLON, DIAGNOSTIC      with Dr. Hillary Gerber HX CHOLECYSTECTOMY  02/2021    HX GI      ulcer    HX HEENT      sinus surg    HX RENAL TRANSPLANT      VASCULAR SURGERY PROCEDURE UNLIST      shunt in prep for dialysis     Family History   Problem Relation Age of Onset    Colon Polyps Brother     Cancer Mother     Diabetes Father       Social History     Tobacco Use    Smoking status: Never Smoker    Smokeless tobacco: Never Used   Substance Use Topics    Alcohol use: No       Prior to Admission medications    Medication Sig Start Date End Date Taking? Authorizing Provider   RenaPlex-D 800 mcg-12.5 mg -2,000 unit tab Take 1 Tablet by mouth daily. 4/15/21  Yes Other, MD Abelardo   sevelamer carbonate (RENVELA) 800 mg tab tab Take 800 mg by mouth three (3) times daily. 6/8/21  Yes Marleni, MD Abelardo   ciprofloxacin HCl (CIPRO) 500 mg tablet Take 1 Tablet by mouth two (2) times a day for 10 days. 6/7/21 6/17/21 Yes [place] Aggarwal MD   dicyclomine (BENTYL) 10 mg capsule Take 1 Capsule by mouth four (4) times daily. 5/25/21  Yes [place] Ly, DO   levothyroxine (SYNTHROID) 50 mcg tablet TAKE 1 TABLET BY MOUTH EVERY DAY 5/12/21  Yes [place] Aggarwal MD   raNITIdine hcl 300 mg cap Take 300 mg by mouth daily (after breakfast). 5/6/21  Yes [place] Aggarwal MD   famotidine (PEPCID) 20 mg tablet Take 1 Tab by mouth two (2) times a day. 5/6/21  Yes [place] Aggarwal MD   dicyclomine (BENTYL) 10 mg capsule TAKE 1 CAPSULE BY MOUTH FOUR TIMES DAILY 4/15/21  Yes [place] Aggarwal MD   sucralfate (CARAFATE) 1 gram tablet TAKE 1 TABLET BY MOUTH FOUR TIMES DAILY 3/28/21  Yes [place] Aggarwal MD   carvediloL (COREG) 6.25 mg tablet Take 6.25 mg by mouth two (2) times daily (with meals).  9/24/20  Yes Provider, Historical   fluticasone propion-salmeteroL (Advair Diskus) 250-50 mcg/dose diskus inhaler Take 1 Puff by inhalation every twelve (12) hours. Yes Provider, Historical   valGANciclovir (VALCYTE) 450 mg tablet Take 450 mg by mouth daily. Yes Provider, Historical   fluticasone propion-salmeteroL (ADVAIR/WIXELA) 250-50 mcg/dose diskus inhaler Take 1 Puff by inhalation every twelve (12) hours. 3/2/21  Yes Mohammed Phalen, MD   carvediloL (COREG) 6.25 mg tablet Take 1 Tab by mouth two (2) times daily (with meals). 3/2/21  Yes Mohammed Phalen, MD   valGANciclovir (VALCYTE) 450 mg tablet Take 2 Tabs by mouth daily. 3/2/21  Yes Mohammed Phalen, MD   simvastatin (ZOCOR) 20 mg tablet TAKE 1 TABLET BY MOUTH DAILY AS DIRECTED. 2/19/21  Yes Mohammed Phalen, MD   sertraline (ZOLOFT) 50 mg tablet Take 1 Tab by mouth daily. 2/10/21  Yes Mohammed Phalen, MD   amiodarone (CORDARONE) 200 mg tablet TAKE 1 TABLET BY MOUTH EVERY DAY 1/13/21  Yes Mohammed Phalen, MD   warfarin (COUMADIN) 2 mg tablet Take 3 Tabs by mouth daily. Indications: blood clot in a deep vein of the extremities  Patient taking differently: Take 2 mg by mouth two (2) times a day. 2 tabs  Indications: blood clot in a deep vein of the extremities 12/29/20  Yes Hillary Bell NP   Dexilant 60 mg CpDB capsule (delayed release) TAKE ONE CAPSULE BY MOUTH EVERY DAY 12/22/20  Yes Mohammed Phalen, MD   amLODIPine (NORVASC) 10 mg tablet TAKE 1 TABLET BY MOUTH EVERY DAY  Patient taking differently: 5 mg daily. Indications: high blood pressure 9/22/20  Yes Mohammed Phalen, MD   omeprazole (PRILOSEC) 20 mg capsule TAKE ONE CAPSULE BY MOUTH TWICE DAILY 9/22/20  Yes Mohammed Phalen, MD   metoprolol tartrate (LOPRESSOR) 50 mg tablet Take 50 mg by mouth daily.  2/22/19  Yes Abelardo Yepez MD   ESTRACE 0.01 % (0.1 mg/gram) vaginal cream INSERT 2 GRAMS INTO VAGINA EVERY MONDAY AND THURSDAY 4/11/17  Yes Jeanne Monsalve MD   cranberry extract 425 mg cap 425 mg. 1/17/14  Yes Provider, Historical   LORazepam (ATIVAN) 0.5 mg tablet 0.5 mg.   Yes Provider, Historical   fluticasone (FLONASE) 50 mcg/actuation nasal spray 1 [place]. Yes Provider, Historical   EPINEPHrine (EPIPEN) 0.3 mg/0.3 mL injection 0.3 mg. 10/1/16  Yes Provider, Historical   fluticasone-salmeterol (ADVAIR DISKUS) 250-50 mcg/dose diskus inhaler Take 1 Puff by inhalation every twelve (12) hours. Yes Provider, Historical   montelukast (SINGULAIR) 10 mg tablet Take 10 mg by mouth daily. Yes Provider, Historical   oxyCODONE-acetaminophen (PERCOCET) 5-325 mg per tablet Take 1 Tablet by mouth every six (6) hours as needed. 6/2/21   Other, MD Abelardo   zolpidem (AMBIEN) 5 mg tablet Take 5 mg by mouth nightly as needed. 4/5/21   Marleni, MD Abelardo   ondansetron (Zofran ODT) 4 mg disintegrating tablet 1 Tablet by SubLINGual route every eight (8) hours as needed for Nausea or Vomiting. 5/25/21   Carlitos Carmichael,    meclizine (ANTIVERT) 25 mg tablet TAKE 1 TABLET BY MOUTH THREE TIMES DAILY FOR UP TO 10 DAYS AS NEEDED FOR DIZZINESS 4/27/21   Sammi Chau MD   albuterol (PROVENTIL VENTOLIN) 2.5 mg /3 mL (0.083 %) nebu USE 1 VIAL VIA NEBULIZER THREE TIMES DAILY 2/8/21   Sammi Chau MD   ondansetron (ZOFRAN ODT) 8 mg disintegrating tablet 1 p.o. every 6 to 8 hours for nausea and vomiting this does not need to be a tablet that orally dissolves. 10/9/20   Sammi Chau MD   aluminum & magnesium hydroxide-simethicone (Maalox Maximum Strength) 400-400-40 mg/5 mL suspension Take 10 mL by mouth every six (6) hours as needed for Indigestion. 9/17/20   Leda Marques MD   hyoscyamine SL (LEVSIN/SL) 0.125 mg SL tablet 0.125 mg by SubLINGual route every four (4) hours as needed.     Provider, Historical     Allergies   Allergen Reactions    Aspirin Rash and Unknown (comments)    Bactrim [Sulfamethoxazole-Trimethoprim] Rash and Unknown (comments)    Bromfenac Rash and Unknown (comments)    Cefepime Other (comments)     Neurological reaction    Ceftriaxone Rash    Copper Rash    Ibuprofen Rash and Unknown (comments)    Ketorolac Tromethamine Rash and Unknown (comments)    Morphine Rash and Unknown (comments)    Relafen [Nabumetone] Rash and Unknown (comments)    Rifampin Rash and Unknown (comments)    Vancomycin Rash and Unknown (comments)     Pt reports causes itching, takes benadryl prior to use. Pt denies anaphylaxis. Requires benadryl with each vancomycin dose        Review of Systems:  14 point ROS reviewed with patient, reported negative except as mentioned in HPI. Objective:     Vitals:  Visit Vitals  BP (!) 124/54 (BP 1 Location: Right upper arm, BP Patient Position: At rest)   Pulse (!) 58   Temp 98.1 °F (36.7 °C)   Resp 16   Ht 5' 1\" (1.549 m)   Wt 72.1 kg (159 lb)   SpO2 98%   BMI 30.04 kg/m²       Physical Exam:  General: Middle aged female, appears stated age, she is awake, alert, oriented x4, cooperative, no acute distress. On room air. Head:  Normocephalic, without obvious abnormality, atraumatic. Eyes:  Conjunctivae/corneas clear. Pupils equal, round, reactive to light. Extraocular movements intact. Lungs: Rales to auscultation in bilateral lower lobes, no wheezing. Chest wall: No tenderness or deformity. No accessory muscle use. Heart:  Regular rate and rhythm, S1, S2 normal, no murmur, click, rub, or gallop. Abdomen: Soft, non-tender. No guarding, or rebound. Bowel sounds normal. No palpable masses. Extremities: Extremities normal, atraumatic, no cyanosis or peripheral edema. Pulses: 2+ and symmetric all extremities. Skin: Skin color, texture, appropriate for ethnicity, turgor normal.   Lymph nodes: Cervical nodes normal.  Neurologic: Awake and oriented, x4. No cranial nerve deficit. Equal strength. Intact sensation bilaterally.       Labs:  Recent Results (from the past 24 hour(s))   CBC WITH AUTOMATED DIFF    Collection Time: 06/12/21  6:45 AM   Result Value Ref Range    WBC 8.1 4.6 - 13.2 K/uL    RBC 2.31 (L) 4.20 - 5.30 M/uL    HGB 7.8 (L) 12.0 - 16.0 g/dL    HCT 24.5 (L) 35.0 - 45.0 %    MCV 106.1 (H) 74.0 - 97.0 FL    MCH 33.8 24.0 - 34.0 PG    MCHC 31.8 31.0 - 37.0 g/dL    RDW 16.0 (H) 11.6 - 14.5 %    PLATELET 088 901 - 918 K/uL    MPV 9.6 9.2 - 11.8 FL    NEUTROPHILS 70 40 - 73 %    LYMPHOCYTES 29 21 - 52 %    MONOCYTES 1 (L) 3 - 10 %    EOSINOPHILS 0 0 - 5 %    BASOPHILS 0 0 - 2 %    IMMATURE GRANULOCYTES 0 %    ABS. NEUTROPHILS 5.6 1.8 - 8.0 K/UL    ABS. LYMPHOCYTES 2.4 0.9 - 3.6 K/UL    ABS. MONOCYTES 0.1 0.05 - 1.2 K/UL    ABS. EOSINOPHILS 0.0 0.0 - 0.4 K/UL    ABS. BASOPHILS 0.0 0.0 - 0.1 K/UL    ABS. IMM. GRANS. 0.0 K/UL   PROTHROMBIN TIME + INR    Collection Time: 06/12/21  6:45 AM   Result Value Ref Range    Prothrombin time 17.2 (H) 11.5 - 15.2 sec    INR 1.5 (H) 0.8 - 1.2     METABOLIC PANEL, BASIC    Collection Time: 06/12/21  6:45 AM   Result Value Ref Range    Sodium 129 (L) 135 - 145 mmol/L    Potassium 8.0 (HH) 3.2 - 5.1 mmol/L    Chloride 99 94 - 111 mmol/L    CO2 15 (L) 21 - 33 mmol/L    Anion gap 15 mmol/L    Glucose 155 (H) 70 - 110 mg/dL    BUN 68 (H) 9 - 21 mg/dL    Creatinine 10.10 (H) 0.70 - 1.20 mg/dL    BUN/Creatinine ratio 7      GFR est AA 5 ml/min/1.73m2    GFR est non-AA 4 ml/min/1.73m2    Calcium 8.0 (L) 8.5 - 10.5 mg/dL   HEPATIC FUNCTION PANEL    Collection Time: 06/12/21  6:45 AM   Result Value Ref Range    Protein, total 6.6 6.1 - 8.4 g/dL    Albumin 3.4 (L) 3.5 - 4.7 g/dL    Globulin 3.2 g/dL    A-G Ratio 1.1      Bilirubin, total 0.8 0.2 - 1.0 mg/dL    Bilirubin, direct 0.3 0.0 - 0.3 mg/dL    Alk.  phosphatase 85 38 - 126 U/L    AST (SGOT) 41 14 - 74 U/L    ALT (SGPT) 23 3 - 52 U/L   LIPASE    Collection Time: 06/12/21  6:45 AM   Result Value Ref Range    Lipase 92 (H) 10 - 57 U/L   LACTIC ACID    Collection Time: 06/12/21  6:45 AM   Result Value Ref Range    Lactic acid 1.5 0.5 - 2.0 mmol/L   MAGNESIUM    Collection Time: 06/12/21  6:45 AM   Result Value Ref Range    Magnesium 2.2 1.7 - 2.8 mg/dL   URINALYSIS W/ RFLX MICROSCOPIC    Collection Time: 06/12/21  6:45 AM   Result Value Ref Range    Color Yellow/Straw      Appearance Clear Clear      Specific gravity 1.010 1.003 - 1.035      pH (UA) 7.0 5.0 - 9.0      Protein 100 (A) Negative mg/dL    Glucose Negative Negative mg/dL    Ketone Negative Negative mg/dL    Bilirubin Negative Negative      Blood Trace (A) Negative      Urobilinogen 0.2 0.2 - 1.0 EU/dL    Nitrites Negative Negative      Leukocyte Esterase Negative Negative     PROCALCITONIN    Collection Time: 06/12/21  6:45 AM   Result Value Ref Range    Procalcitonin 0.45 (L) 0.5 - 2.0 ng/mL   URINE MICROSCOPIC    Collection Time: 06/12/21  6:45 AM   Result Value Ref Range    WBC 0-4 0 - 4 /hpf    RBC 0-5 0 - 2 /hpf    Epithelial cells Few 0 - 20 /lpf    Bacteria 2+ (A) None /hpf   METABOLIC PANEL, BASIC    Collection Time: 06/12/21 10:15 AM   Result Value Ref Range    Sodium 134 (L) 135 - 145 mmol/L    Potassium 7.4 (HH) 3.2 - 5.1 mmol/L    Chloride 108 94 - 111 mmol/L    CO2 17 (L) 21 - 33 mmol/L    Anion gap 9 mmol/L    Glucose 104 70 - 110 mg/dL    BUN 68 (H) 9 - 21 mg/dL    Creatinine 10.30 (H) 0.70 - 1.20 mg/dL    BUN/Creatinine ratio 7      GFR est AA 5 ml/min/1.73m2    GFR est non-AA 4 ml/min/1.73m2    Calcium 7.9 (L) 8.5 - 10.5 mg/dL   HEPATIC FUNCTION PANEL    Collection Time: 06/12/21 10:15 AM   Result Value Ref Range    Protein, total 6.2 6.1 - 8.4 g/dL    Albumin 3.0 (L) 3.5 - 4.7 g/dL    Globulin 3.2 g/dL    A-G Ratio 0.9      Bilirubin, total 0.6 0.2 - 1.0 mg/dL    Bilirubin, direct 0.1 0.0 - 0.3 mg/dL    Alk.  phosphatase 81 38 - 126 U/L    AST (SGOT) 36 14 - 74 U/L    ALT (SGPT) 33 3 - 52 U/L   LIPASE    Collection Time: 06/12/21 10:15 AM   Result Value Ref Range    Lipase 86 (H) 10 - 57 U/L   MAGNESIUM    Collection Time: 06/12/21 10:15 AM   Result Value Ref Range    Magnesium 2.0 1.7 - 2.8 mg/dL       Imaging:  CT ABD PELV WO CONT    Result Date: 6/12/2021  EXAM: CT ABD PELV WO CONT CLINICAL INDICATION/HISTORY:  abdominal pain and diarrhea r/o colitis. > Additional: None COMPARISON: 1/8/2021   > Correlative imaging: None. TECHNIQUE: Axial CT imaging of the abdomen and pelvis was performed without intravenous contrast. Multiplanar reformats were generated. One or more dose reduction techniques were used on this CT: automated exposure control, adjustment of the mAs and/or kVp according to patient size, and iterative reconstruction techniques. The specific techniques used on this CT exam have been documented in the patient's electronic medical record. Digital imaging and communications in medicine (DICOM) format image data are available to nonaffiliated external healthcare facilities or entities on a secure, media free, reciprocally searchable basis with patient authorization for at least a 12 month period after this study. _______________ FINDINGS: LOWER CHEST: Essentially clear. LIVER, BILIARY:   > Liver: Unremarkable.   > Biliary: Cholecystectomy. No ductal dilation. SPLEEN: Unremarkable. PANCREAS: Unremarkable. ADRENALS: Unremarkable. KIDNEYS/URETERS/BLADDER: Severe renal atrophy bilaterally involving native kidneys. There is a right lower quadrant transplant present containing some subcentimeter hyperdense cysts along with a probable simple cyst in the lower pole. There is also a small hyperdense cyst arising from the lateral interpolar native right kidney. There are simple cysts in both native kidneys as well. PELVIC ORGANS: Unremarkable. GASTROINTESTINAL TRACT: Moderate descending and sigmoid colonic diverticulosis. No colonic wall thickening. Specifically, no evidence of colitis. Small bowel loops are unremarkable. VASCULATURE: Moderate aortoiliac atherosclerosis. LYMPH NODES: No enlarged lymph nodes. OTHER: None. MUSCULOSKELETAL: Degenerative changes in lower lumbar spine and in the hips. Multilevel lower thoracic ankylosis. _______________     1. No etiology for abdominal pain. No evidence of colitis. 2. Cholecystectomy.  3. Right lower quadrant renal transplant in situ. 4. Moderate descending and sigmoid colonic diverticulosis. Please note: Lack of intravenous contrast enhancement in the abdomen and pelvis may lead to: -- decreased sensitivity for detection of solid organ lesions or injury -- decreased ability to characterize solid organ lesions -- decreased sensitivity for detection and characterization of vascular pathology such as vessel dissection or thrombosis, intestinal ischemia, active hemorrhage or pseudoaneurysm -- decreased sensitivity for detection of acute inflammatory conditions        Assessment & Plan:     #1: Acute hyperkalemia:  -likely 2/2 missed hemodialysis. -ECG showed sinus bradycardia/48 peaked T wave.  -received calcium gluconate, insulin, D50, and Kayexalate in ED.  -Admit to inpatient, ICU.  -Plan for emergent dialysis today as per nephrology, Dr. Lavaun Osler who was notified in ED. Geovani Miller nurse will be coming in to dialyse, this was confirmed by [place] sup. #2: Intractible nausea& vomiting:  -likely 2/2 missed HD. No bowel obstruction on CT.  -antiemetics prn.  -clear liquids, adv as tolerated. #3: Acute on chronic diarrhea:  -?? IBS, patient endorsed occasional relief with Imodium  -CT was negative for colitis. Per chart review at [facility], her covid was negative and c-diff were neg.  -will recheck stool for C. Difficile. #4: End-stage renal disease:  -On hemodialysis, MWF  -Chronic condition, also with a history of renal transplant (2002) with rejection, last 2 years.  -on prednisone 5mg daily, continue.  -Continue HD plan. Further recommendations per nephro.  -Continue sevelamer and renal Plex. #5: Hypertension:  -Chronic condition, stable. -Continue home medications. Hold BB for bradycardia. -her losartan was d/cd during hospital stay at [facility]. #6: Cytomegalovirus:  -Chronic condition on valganciclovir 450 mg daily, which will be continued.  -No hx of HIV.     #7: Hyperlipidemia:  -Chronic condition on atorvastatin, continue. #8: Atrial fibrillation:  -Chronic condition, on amiodarone, carvedilol and warfarin. -INR is 1.5 today, continue warfarin current dose, check INR daily. -warfarin dosing per pharmacy protocol. Hold carvedilol for bradycardia    #9: Urinary tract infection:  -Chronic condition, recently diagnosed on 5/28 at [facility],   -Today's day #4 on ciprofloxacin which will be completed on 6/18/2021. #10: GERD:  -Chronic condition on PPI and Carafate, continue. #11: Asthma:  -Not in acute exacerbation, continue Advair Diskus, fluticasone daily, albuterol as needed SOB/wheeze. #11: Anemia:  -likely 2/2 to chronic disease  -H&H 7.8/24.5. . 1. monitor for now, transfuse prn Hg <7.  -Check folate and B12 in AM.    VTE prophylaxis: Warfarin. Code status: Full code  Plan of care discussed with patient and RN. Total time spent: 40 minutes.

## 2021-06-12 NOTE — PROCEDURES
[facility]  [phone]    Vitals   Pre   Post   Assessment   Pre   Post     Temp  Temp: 97.6 °F (36.4 °C) (06/12/21 1755) 97.9 LOC  A&Ox4 A&Ox4   HR   Pulse (Heart Rate): (!) 58 (06/12/21 1755) 86 Lungs   Clear, RA Remains on RA    B/P  BP: (!) 156/70 (06/12/21 1755) 135/66 Cardiac   S1S2, RRR RRR    Resp   Resp Rate: 17 (06/12/21 1640) 16 Skin   Warm, dry, intact remains intact   Pain level  Pain Intensity 1: 0 (06/12/21 1802) 0 Edema  generalized No change   Orders:    Duration:   Start:   3917 End:   2125 Total:   3.5 hrs   Dialyzer:   Dialyzer/Set Up Inspection: Revaclear (06/12/21 1755)   K Bath:   Dialysate K (mEq/L):  (0K for first hour, then 2K for remainder of treatment) (06/12/21 1755)   Ca Bath:   Dialysate CA (mEq/L): 2.5 (06/12/21 1755)   Na/Bicarb:   Dialysate NA (mEq/L): 138 (06/12/21 1755)   Target Fluid Removal:   Goal/Amount of Fluid to Remove (mL): 2500 mL (06/12/21 1755)   Access     Type & Location:   LINDSAY AVF +bruit/thrill. Cannulated with 15g needles each site. Running well with  as ordered.     Labs     Obtained/Reviewed   Critical Results Called   Date when labs were drawn-  Hgb-    HGB   Date Value Ref Range Status   06/12/2021 7.8 (L) 12.0 - 16.0 g/dL Final     K-    Potassium   Date Value Ref Range Status   06/12/2021 7.4 (HH) 3.2 - 5.1 mmol/L Final     Comment:     CALLED TO AND READ BACK BY FELIPE LIU AT [id number] NORBERT RAMSEY     Ca-   Calcium   Date Value Ref Range Status   06/12/2021 7.9 (L) 8.5 - 10.5 mg/dL Final     Bun-   BUN   Date Value Ref Range Status   06/12/2021 68 (H) 9 - 21 mg/dL Final     Creat-   Creatinine   Date Value Ref Range Status   06/12/2021 10.30 (H) 0.70 - 1.20 mg/dL Final      Medications/ Blood Products Given     Name   Dose   Route and Time     None given                Blood Volume Processed (BVP): 73L Net Fluid   Removed:  2000 mL   Comments   Time Out Done: 0887  Primary Nurse Rpt Ephraim Gilbert RN  Primary Nurse Rpt Post: BONNIE Manriquez RN  Pt Education: Access care  Care Plan: HD today  Tx Summary:  7004: Consent obtained. Safety checks complete, time out performed. 1755: LINDSAY AVF assessed, +bruit/thrill, no redness, warmth or drainage noted. Skin prepped per procedure using chloraprep scrub x 30 sec, followed by 30 sec dry time each site. Cannulated using 15g needles each site and secured with tape. +flash/aspiration/flush. HD initiated. Access visible, lines secure. Medications reviewed. 1900: Dialysate bath changed to 2K/2.5Ca  2125: HD treatment complete. All possible blood returned to the patient. De-cannulated and pressure held until hemostasis was achieved. Dressing applied. +bruit/thrill. VSS. SBAR given to primary RN.      Admitting Diagnosis: N/V/D, hyperkalemia, ESRD  Pt's previous clinic:   Informed Consent Verified: Yes (06/12/21 1755)  Hepatitis Status: HBsAg: (xxx) HBsAb: (xxx)  Machine Number: [id number] (06/12/21 1755)   Telemetry status: not monitored  Pre-Dialysis Weight: 72.1 kg (158 lb 15.2 oz) (06/12/21 1755)

## 2021-06-12 NOTE — PROGRESS NOTES
1640  Received pt at this time from ED by lucia, tele applied, VSS, pt oriented to room, call bell in reach. 1745  Pt taken to dialysis at this time, phenergan given for vomiting, stool sample sent for cdiff.

## 2021-06-12 NOTE — PROGRESS NOTES
Patient will not be admitted as there is no dialysis staff coverage in this hospital today.  Plan discussed with ED provider, who will transfer

## 2021-06-12 NOTE — ROUTINE PROCESS
TRANSFER - OUT REPORT: 
 
Verbal report given to Damien Yo  on Quenten Sour  being transferred to ICU for routine progression of care Report consisted of patients Situation, Background, Assessment and  
Recommendations(SBAR). Information from the following report(s) SBAR, ED Summary, [facility] and Recent Results was reviewed with the receiving nurse. Lines:  
Peripheral IV 06/12/21 Right Wrist (Active) Site Assessment Clean, dry, & intact 06/12/21 1002 Phlebitis Assessment 0 06/12/21 0522 Infiltration Assessment 0 06/12/21 8815 Dressing Status Clean, dry, & intact 06/12/21 8363 Dressing Type Transparent 06/12/21 9711 Hub Color/Line Status Pink;Flushed;Patent 06/12/21 2798 Opportunity for questions and clarification was provided. Patient transported with: 
 Monitor Registered Nurse

## 2021-06-12 NOTE — Clinical Note
Status[de-identified] INPATIENT [101]   Type of Bed: Telemetry [19]   Cardiac Monitoring Required?: Yes   Inpatient Hospitalization Certified Necessary for the Following Reasons: 3.  Patient receiving treatment that can only be provided in an inpatient setting (further clarification in H&P documentation)   Admitting Diagnosis: Hyperkalemia [284036]   Admitting Physician: Brad Pack [[id number]]   Attending Physician: Brad Pack [[id number]]   Estimated Length of Stay: 2 Midnights   Discharge Plan[de-identified] Home with Office Follow-up

## 2021-06-12 NOTE — ED NOTES
Verbal shift change report given to Nadya Trevizo RN (oncoming nurse) by Crow Fulton RN (offgoing nurse). Report included the following information ED Summary.

## 2021-06-13 VITALS
WEIGHT: 154 LBS | SYSTOLIC BLOOD PRESSURE: 116 MMHG | HEART RATE: 80 BPM | BODY MASS INDEX: 29.07 KG/M2 | TEMPERATURE: 98.2 F | OXYGEN SATURATION: 97 % | RESPIRATION RATE: 15 BRPM | DIASTOLIC BLOOD PRESSURE: 62 MMHG | HEIGHT: 61 IN

## 2021-06-13 LAB
ANION GAP SERPL CALC-SCNC: 12 MMOL/L
ATRIAL RATE: 0 BPM
BASOPHILS # BLD: 0.1 K/UL (ref 0–0.1)
BASOPHILS NFR BLD: 1 % (ref 0–2)
BUN SERPL-MCNC: 19 MG/DL (ref 9–21)
BUN/CREAT SERPL: 4
C DIFF GDH STL QL: NEGATIVE
C DIFF TOX A+B STL QL IA: NEGATIVE
CA-I BLD-MCNC: 8.1 MG/DL (ref 8.5–10.5)
CALCULATED R AXIS, ECG10: 81 DEGREES
CALCULATED T AXIS, ECG11: 68 DEGREES
CHLORIDE SERPL-SCNC: 99 MMOL/L (ref 94–111)
CO2 SERPL-SCNC: 26 MMOL/L (ref 21–33)
CREAT SERPL-MCNC: 4.3 MG/DL (ref 0.7–1.2)
DIAGNOSIS, 93000: NORMAL
EOSINOPHIL # BLD: 0 K/UL (ref 0–0.4)
EOSINOPHIL NFR BLD: 0 % (ref 0–5)
ERYTHROCYTE [DISTWIDTH] IN BLOOD BY AUTOMATED COUNT: 16.2 % (ref 11.6–14.5)
GLUCOSE SERPL-MCNC: 64 MG/DL (ref 70–110)
HCT VFR BLD AUTO: 23.1 % (ref 35–45)
HGB BLD-MCNC: 7.3 G/DL (ref 12–16)
IMM GRANULOCYTES # BLD AUTO: 0 K/UL
IMM GRANULOCYTES NFR BLD AUTO: 0 %
INR PPP: 1.9 (ref 0.8–1.2)
INTERPRETATION: NORMAL
LYMPHOCYTES # BLD: 2 K/UL (ref 0.9–3.6)
LYMPHOCYTES NFR BLD: 22 % (ref 21–52)
MCH RBC QN AUTO: 34 PG (ref 24–34)
MCHC RBC AUTO-ENTMCNC: 31.6 G/DL (ref 31–37)
MCV RBC AUTO: 107.4 FL (ref 74–97)
MONOCYTES # BLD: 0.2 K/UL (ref 0.05–1.2)
MONOCYTES NFR BLD: 2 % (ref 3–10)
NEUTS SEG # BLD: 6.9 K/UL (ref 1.8–8)
NEUTS SEG NFR BLD: 75 % (ref 40–73)
P-R INTERVAL, ECG05: 224 MS
PLATELET # BLD AUTO: 301 K/UL (ref 135–420)
PMV BLD AUTO: 9.2 FL (ref 9.2–11.8)
POTASSIUM SERPL-SCNC: 3.6 MMOL/L (ref 3.2–5.1)
PROTHROMBIN TIME: 21.1 SEC (ref 11.5–15.2)
Q-T INTERVAL, ECG07: 519 MS
QRS DURATION, ECG06: 105 MS
QTC CALCULATION (BEZET), ECG08: 464 MS
RBC # BLD AUTO: 2.15 M/UL (ref 4.2–5.3)
SODIUM SERPL-SCNC: 137 MMOL/L (ref 135–145)
VENTRICULAR RATE, ECG03: 48 BPM
WBC # BLD AUTO: 9.2 K/UL (ref 4.6–13.2)

## 2021-06-13 PROCEDURE — 85025 COMPLETE CBC W/AUTO DIFF WBC: CPT

## 2021-06-13 PROCEDURE — 99218 HC RM OBSERVATION: CPT

## 2021-06-13 PROCEDURE — 96372 THER/PROPH/DIAG INJ SC/IM: CPT

## 2021-06-13 PROCEDURE — 85610 PROTHROMBIN TIME: CPT

## 2021-06-13 PROCEDURE — 74011250636 HC RX REV CODE- 250/636: Performed by: NURSE PRACTITIONER

## 2021-06-13 PROCEDURE — 36415 COLL VENOUS BLD VENIPUNCTURE: CPT

## 2021-06-13 PROCEDURE — 80048 BASIC METABOLIC PNL TOTAL CA: CPT

## 2021-06-13 PROCEDURE — 74011250637 HC RX REV CODE- 250/637: Performed by: NURSE PRACTITIONER

## 2021-06-13 PROCEDURE — 87040 BLOOD CULTURE FOR BACTERIA: CPT

## 2021-06-13 PROCEDURE — 82607 VITAMIN B-12: CPT

## 2021-06-13 RX ADMIN — ZOLPIDEM TARTRATE 5 MG: 5 TABLET ORAL at 00:36

## 2021-06-13 RX ADMIN — MONTELUKAST 10 MG: 10 TABLET, FILM COATED ORAL at 08:52

## 2021-06-13 RX ADMIN — HYDROMORPHONE HYDROCHLORIDE 0.5 MG: 1 INJECTION, SOLUTION INTRAMUSCULAR; INTRAVENOUS; SUBCUTANEOUS at 00:32

## 2021-06-13 RX ADMIN — SERTRALINE 50 MG: 50 TABLET, FILM COATED ORAL at 08:51

## 2021-06-13 RX ADMIN — PANTOPRAZOLE SODIUM 40 MG: 40 TABLET, DELAYED RELEASE ORAL at 08:52

## 2021-06-13 RX ADMIN — SUCRALFATE 1 G: 1 TABLET ORAL at 11:34

## 2021-06-13 RX ADMIN — AMLODIPINE BESYLATE 5 MG: 5 TABLET ORAL at 08:51

## 2021-06-13 RX ADMIN — LEVOTHYROXINE SODIUM 50 MCG: 0.05 TABLET ORAL at 06:10

## 2021-06-13 RX ADMIN — SEVELAMER CARBONATE 800 MG: 800 TABLET, FILM COATED ORAL at 07:33

## 2021-06-13 RX ADMIN — ACETAMINOPHEN 650 MG: 325 TABLET ORAL at 09:43

## 2021-06-13 RX ADMIN — FAMOTIDINE 20 MG: 20 TABLET, FILM COATED ORAL at 08:51

## 2021-06-13 RX ADMIN — SUCRALFATE 1 G: 1 TABLET ORAL at 07:33

## 2021-06-13 NOTE — DISCHARGE INSTRUCTIONS
Patient Education        Diarrhea: Care Instructions  Your Care Instructions     Diarrhea is loose, watery stools (bowel movements). The exact cause is often hard to find. Sometimes diarrhea is your body's way of getting rid of what caused an upset stomach. Viruses, food poisoning, and many medicines can cause diarrhea. Some people get diarrhea in response to emotional stress, anxiety, or certain foods. Almost everyone has diarrhea now and then. It usually isn't serious, and your stools will return to normal soon. The important thing to do is replace the fluids you have lost, so you can prevent dehydration. The doctor has checked you carefully, but problems can develop later. If you notice any problems or new symptoms, get medical treatment right away. Follow-up care is a key part of your treatment and safety. Be sure to make and go to all appointments, and call your doctor if you are having problems. It's also a good idea to know your test results and keep a list of the medicines you take. How can you care for yourself at home? · Watch for signs of dehydration, which means your body has lost too much water. Dehydration is a serious condition and should be treated right away. Signs of dehydration are:  ? Increasing thirst and dry eyes and mouth. ? Feeling faint or lightheaded. ? A smaller amount of urine than normal.  · To prevent dehydration, drink plenty of fluids. Choose water and other caffeine-free clear liquids until you feel better. If you have kidney, heart, or liver disease and have to limit fluids, talk with your doctor before you increase the amount of fluids you drink. · Begin eating small amounts of mild foods the next day, if you feel like it. ? Try yogurt that has live cultures of Lactobacillus. (Check the label.)  ? Avoid spicy foods, fruits, alcohol, and caffeine until 48 hours after all symptoms are gone. ? Avoid chewing gum that contains sorbitol. ?  Avoid dairy products (except for yogurt with Lactobacillus) while you have diarrhea and for 3 days after symptoms are gone. · The doctor may recommend that you take over-the-counter medicine, such as loperamide (Imodium), if you still have diarrhea after 6 hours. Read and follow all instructions on the label. Do not use this medicine if you have bloody diarrhea, a high fever, or other signs of serious illness. Call your doctor if you think you are having a problem with your medicine. When should you call for help? Call 911 anytime you think you may need emergency care. For example, call if:    · You passed out (lost consciousness).     · Your stools are maroon or very bloody. Call your doctor now or seek immediate medical care if:    · You are dizzy or lightheaded, or you feel like you may faint.     · Your stools are black and look like tar, or they have streaks of blood.     · You have new or worse belly pain.     · You have symptoms of dehydration, such as:  ? Dry eyes and a dry mouth. ? Passing only a little urine. ? Feeling thirstier than usual.     · You have a new or higher fever. Watch closely for changes in your health, and be sure to contact your doctor if:    · Your diarrhea is getting worse.     · You see pus in the diarrhea.     · You are not getting better after 2 days (48 hours). Where can you learn more? Go to http://www.gray.com/  Enter [id number] in the search box to learn more about \"Diarrhea: Care Instructions. \"  Current as of: February 26, 2020               Content Version: 12.8  © 2006-2021 [employer]. Care instructions adapted under license by [employer] (which disclaims liability or warranty for this information). If you have questions about a medical condition or this instruction, always ask your healthcare professional. [place] [address] any warranty or liability for your use of this information.          Patient Education        Blood Urea Nitrogen (BUN) Test: About This Test  What is it? A blood urea nitrogen (BUN) test measures the amount of nitrogen in your blood that comes from the waste product urea. Urea is made in the liver and passed out of your body in the urine. If your kidneys are not able to remove urea from the blood normally, your BUN level rises. Dehydration can also make your BUN level higher. A BUN test may be done with a blood creatinine test. The level of creatinine in your blood also tells how well your kidneys are working. A high creatinine level may mean your kidneys are not working properly. BUN and creatinine tests can be used together to find the BUN-to-creatinine ratio. Why is this test done? A BUN test is done to:  · See if your kidneys are working normally. · See if your kidney disease is getting worse. · See if treatment of your kidney disease is working. · Check for severe dehydration. Dehydration generally causes BUN levels to rise more than creatinine levels. This causes a high BUN-to-creatinine ratio. Kidney disease or blockage of the flow of urine from your kidney causes both BUN and creatinine levels to go up. How do you prepare for the test?  Do not eat a lot of meat or other protein in the 24 hours before having a BUN test.  How is the test done? A health professional uses a needle to take a blood sample, usually from the arm. What happens after the test?  · You will probably be able to go home right away. · You can go back to your usual activities right away. Follow-up care is a key part of your treatment and safety. Be sure to make and go to all appointments, and call your doctor if you are having problems. It's also a good idea to keep a list of the medicines you take. Ask your doctor when you can expect to have your test results. Where can you learn more?   Go to http://www.gray.com/  Enter N853 in the search box to learn more about \"Blood Urea Nitrogen (BUN) Test: About This Test.\"  Current as of: December 17, 2020               Content Version: 12.8  © 5945-8744 [employer], [facility]. Care instructions adapted under license by [employer] (which disclaims liability or warranty for this information). If you have questions about a medical condition or this instruction, always ask your healthcare professional. [place] [address] any warranty or liability for your use of this information.

## 2021-06-13 NOTE — ROUTINE PROCESS
After 5 attempts at IV access by two different staff members, IV access was not established. Provider made aware. Provider will modify medications for IM or PO at this time.

## 2021-06-13 NOTE — ROUTINE PROCESS
Assumed care of pt. Pt changed of urine. Pt resting in bed with no signs of distress at this time. CBWR, 2 side rails up and bed locked in lowest position.

## 2021-06-13 NOTE — DISCHARGE SUMMARY
Discharge Summary       PATIENT ID: Arabella Cat  MRN: [id number]   YOB: 1955    DATE OF ADMISSION: 6/12/2021  6:12 AM    DATE OF DISCHARGE: 06/13/21    PRIMARY CARE PROVIDER: Anthony Gallego MD     ATTENDING PHYSICIAN: Gem Garcia MD  DISCHARGING PROVIDER: Gem Garcia MD        CONSULTATIONS: None    PROCEDURES/SURGERIES: * No surgery found *    ADMITTING [address] [place] [address] COURSE:   Arabella Cat is a 63 y.o. 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 History was obtained from the patient and per chart review. Patient states onset of vomiting and diarrhea was approximately 2 weeks ago. Pt was admitted to [facility] 5/28/21- 6/2/21 for vomiting and diarrhea and dx with Sepsis 2/2 pyelonephritis and Gastroenteritis. Also noted with elevated trop at the time, thought to have infection from old transplanted kidney. C diff neg; Covid neg. Discharged on Amoxicillin which made her sick; so Dr Rola Rooney changed her to Cipro 2 days ago which is to be taken for 10 days. States that she has diarrhea every 30 min; nonbloody and intermittent n/v. she took imodium prior to ED visit today, which helped slowed the diarrhea a bit,  She denies fever or chills. No chest pain, or shortness of breath, body aches. She complains of diffuse abdominal pain. Pt states that she was discharged dispite continuing to have diarrhea. She stated vomiting got worse yesterday, makes her feel weaker, so, she couldn't go for her dialysis yesterday. ED work-up revealed a K level of 8. Nephrology was contacted for emergent HD and patient will be admitted for acute hyperkalemia, vomiting and diarrhea. DISCHARGE DIAGNOSES / PLAN:      Assessment & Plan:      #1:  Acute hyperkalemia:  -likely 2/2 missed hemodialysis. -ECG showed sinus bradycardia/48 peaked T wave.  -received calcium gluconate, insulin, D50, and Kayexalate in ED.  -Admit to inpatient, ICU.  -Plan for emergent dialysis today as per nephrology, Dr. Karan Brandon who was notified in ED. Jillian Beach 1154 nurse will be coming in to [address] [facility], this was confirmed by house sup.     #2: Intractible nausea& vomiting:  -likely 2/2 missed HD. No bowel obstruction on CT.  -antiemetics prn.  -clear liquids, adv as tolerated.      #3: Acute on chronic diarrhea:  -?? IBS, patient endorsed occasional relief with Imodium  -CT was negative for colitis. Per chart review at [facility], her covid was negative and c-diff were neg.  -will recheck stool for C. Difficile.     #4: End-stage renal disease:  -On hemodialysis, MWF  -Chronic condition, also with a history of renal transplant (2002) with rejection, last 2 years.  -on prednisone 5mg daily, continue.  -Continue HD plan. Further recommendations per nephro.  -Continue sevelamer and renal Plex.     #5: Hypertension:  -Chronic condition, stable. -Continue home medications. Hold BB for bradycardia. -her losartan was d/cd during hospital stay at [facility].     #6: Cytomegalovirus:  -Chronic condition on valganciclovir 450 mg daily, which will be continued.  -No hx of HIV.     #7: Hyperlipidemia:  -Chronic condition on atorvastatin, continue.     #8: Atrial fibrillation:  -Chronic condition, on amiodarone, carvedilol and warfarin. -INR is 1.5 today, continue warfarin current dose, check INR daily. -warfarin dosing per pharmacy protocol.  Hold carvedilol for bradycardia     #9: Urinary tract infection:  -Chronic condition, recently diagnosed on 5/28 at [facility],   -Today's day #4 on ciprofloxacin which will be completed on 6/18/2021.     #10: GERD:  -Chronic condition on PPI and Carafate, continue.     #11: Asthma:  -Not in acute exacerbation, continue Advair Diskus, fluticasone daily, albuterol as needed SOB/wheeze.     #11: Anemia:  -likely 2/2 to chronic disease  -H&H 7.8/24.5. . 1. monitor for now, transfuse prn Hg <7.  -Check folate and B12 in AM.     VTE prophylaxis: Warfarin. Code status: Full code  Plan of care discussed with patient and RN. Total time spent: 40 minutes. Pt admitted for hyperkalemia after missing HD, she received urgent HD, and her K has come down to 3.6. She is appropriate for dishcareg and advised to try and make it to HD tomorrow, and not miss any treatments. She has a complicated medical hx, with recnet changes in her antibiotics, appears dr peña has put her on 10 days of cipro for uti/recent pyelo, her UA here is wnl. She was also on iv vanco at HD for recent staph epi bacteremia. She says they missed the first few doses, so she is still on it, even though it appears on the [facility] dc summary that it should already be completed. It is important to note that I have not made any changes to her medications. FOLLOW UP APPOINTMENTS:    Follow-up Information     Follow up With Specialties Details Why Urban Tabares MD [facility]   [address]  [phone]               DIET:renal      DISCHARGE MEDICATIONS:  Current Discharge Medication List      CONTINUE these medications which have NOT CHANGED    Details   RenaPlex-D 800 mcg-12.5 mg -2,000 unit tab Take 1 Tablet by mouth daily. sevelamer carbonate (RENVELA) 800 mg tab tab Take 800 mg by mouth three (3) times daily. ciprofloxacin HCl (CIPRO) 500 mg tablet Take 1 Tablet by mouth two (2) times a day for 10 days. Qty: 20 Tablet, Refills: 0      !! dicyclomine (BENTYL) 10 mg capsule Take 1 Capsule by mouth four (4) times daily. Qty: 30 Capsule, Refills: 0      levothyroxine (SYNTHROID) 50 mcg tablet TAKE 1 TABLET BY MOUTH EVERY DAY  Qty: 90 Tab, Refills: 3      raNITIdine hcl 300 mg cap Take 300 mg by mouth daily (after breakfast).   Qty: 30 Cap, Refills: 1    Associated Diagnoses: Gastroesophageal reflux disease with esophagitis without hemorrhage      famotidine (PEPCID) 20 mg tablet Take 1 Tab by mouth two (2) times a day. Qty: 30 Tab, Refills: 1    Associated Diagnoses: Gastroesophageal reflux disease without esophagitis      !! dicyclomine (BENTYL) 10 mg capsule TAKE 1 CAPSULE BY MOUTH FOUR TIMES DAILY  Qty: 120 Cap, Refills: 1      sucralfate (CARAFATE) 1 gram tablet TAKE 1 TABLET BY MOUTH FOUR TIMES DAILY  Qty: 360 Tab, Refills: 0      !! carvediloL (COREG) 6.25 mg tablet Take 6.25 mg by mouth two (2) times daily (with meals). !! fluticasone propion-salmeteroL (Advair Diskus) 250-50 mcg/dose diskus inhaler Take 1 Puff by inhalation every twelve (12) hours. !! valGANciclovir (VALCYTE) 450 mg tablet Take 450 mg by mouth daily. !! fluticasone propion-salmeteroL (ADVAIR/WIXELA) 250-50 mcg/dose diskus inhaler Take 1 Puff by inhalation every twelve (12) hours. Qty: 1 Inhaler, Refills: 3    Associated Diagnoses: Seasonal allergic rhinitis due to pollen      !! carvediloL (COREG) 6.25 mg tablet Take 1 Tab by mouth two (2) times daily (with meals). Qty: 180 Tab, Refills: 2    Associated Diagnoses: Hypertension, unspecified type      !! valGANciclovir (VALCYTE) 450 mg tablet Take 2 Tabs by mouth daily. Qty: 90 Tab, Refills: 5    Associated Diagnoses: ESRD (end stage renal disease) on dialysis ([facility])      simvastatin (ZOCOR) 20 mg tablet TAKE 1 TABLET BY MOUTH DAILY AS DIRECTED. Qty: 90 Tab, Refills: 1      sertraline (ZOLOFT) 50 mg tablet Take 1 Tab by mouth daily. Qty: 90 Tab, Refills: 0      amiodarone (CORDARONE) 200 mg tablet TAKE 1 TABLET BY MOUTH EVERY DAY  Qty: 90 Tab, Refills: 2      warfarin (COUMADIN) 2 mg tablet Take 3 Tabs by mouth daily.  Indications: blood clot in a deep vein of the extremities  Qty: 30 Tab, Refills: 0      Dexilant 60 mg CpDB capsule (delayed release) TAKE ONE CAPSULE BY MOUTH EVERY DAY  Qty: 30 Cap, Refills: 5 amLODIPine (NORVASC) 10 mg tablet TAKE 1 TABLET BY MOUTH EVERY DAY  Qty: 90 Tab, Refills: 3      omeprazole (PRILOSEC) 20 mg capsule TAKE ONE CAPSULE BY MOUTH TWICE DAILY  Qty: 180 Cap, Refills: 2      metoprolol tartrate (LOPRESSOR) 50 mg tablet Take 50 mg by mouth daily. ESTRACE 0.01 % (0.1 mg/gram) vaginal cream INSERT 2 GRAMS INTO VAGINA EVERY MONDAY AND THURSDAY  Qty: 42.5 g, Refills: 5      cranberry extract 425 mg cap 425 mg. LORazepam (ATIVAN) 0.5 mg tablet 0.5 mg.      fluticasone (FLONASE) 50 mcg/actuation nasal spray 1 Spray. EPINEPHrine (EPIPEN) 0.3 mg/0.3 mL injection 0.3 mg.      !! fluticasone-salmeterol (ADVAIR DISKUS) 250-50 mcg/dose diskus inhaler Take 1 Puff by inhalation every twelve (12) hours. montelukast (SINGULAIR) 10 mg tablet Take 10 mg by mouth daily. oxyCODONE-acetaminophen (PERCOCET) 5-325 mg per tablet Take 1 Tablet by mouth every six (6) hours as needed. zolpidem (AMBIEN) 5 mg tablet Take 5 mg by mouth nightly as needed. !! ondansetron (Zofran ODT) 4 mg disintegrating tablet 1 Tablet by SubLINGual route every eight (8) hours as needed for Nausea or Vomiting. Qty: 20 Tablet, Refills: 0      meclizine (ANTIVERT) 25 mg tablet TAKE 1 TABLET BY MOUTH THREE TIMES DAILY FOR UP TO 10 DAYS AS NEEDED FOR DIZZINESS  Qty: 21 Tab, Refills: 0    Associated Diagnoses: Dizziness      albuterol (PROVENTIL VENTOLIN) 2.5 mg /3 mL (0.083 %) nebu USE 1 VIAL VIA NEBULIZER THREE TIMES DAILY  Qty: 90 mL, Refills: 3      !! ondansetron (ZOFRAN ODT) 8 mg disintegrating tablet 1 p.o. every 6 to 8 hours for nausea and vomiting this does not need to be a tablet that orally dissolves.   Qty: 8 Tab, Refills: 2    Associated Diagnoses: Calculus of gallbladder with acute cholecystitis without obstruction      aluminum & magnesium hydroxide-simethicone (Maalox Maximum Strength) 400-400-40 mg/5 mL suspension Take 10 mL by mouth every six (6) hours as needed for Indigestion. Qty: 250 mL, Refills: 0      hyoscyamine SL (LEVSIN/SL) 0.125 mg SL tablet 0.125 mg by SubLINGual route every four (4) hours as needed. !! - Potential duplicate medications found. Please discuss with provider. NOTIFY YOUR PHYSICIAN FOR ANY OF THE FOLLOWING:   Fever over 101 degrees for 24 hours. Chest pain, shortness of breath, fever, chills, nausea, vomiting, diarrhea, change in mentation, falling, weakness, bleeding. Severe pain or pain not relieved by medications. Or, any other signs or symptoms that you may have questions about.     CHRONIC MEDICAL DIAGNOSES:  Problem List as of 6/13/2021 Date Reviewed: 6/7/2021        Codes Class Noted - Resolved    Acute hyperkalemia ICD-10-CM: E87.5  ICD-9-CM: 276.7  6/12/2021 - Present        Encounter for cholecystectomy ICD-10-CM: Z76.89  ICD-9-CM: V65.8  5/6/2021 - Present    Overview Signed 5/6/2021  9:13 AM by Renaldo Alarcon MD     7/2020             Acute left-sided low back pain without sciatica ICD-10-CM: M54.5  ICD-9-CM: 724.2  5/6/2021 - Present        Atrial fibrillation ([facility] ICD-10-CM: I48.91  ICD-9-CM: 427.31  5/6/2021 - Present        Chronic anticoagulation ICD-10-CM: Z79.01  ICD-9-CM: V58.61  5/6/2021 - Present        Stomatitis ICD-10-CM: K12.1  ICD-9-CM: 528.00  3/2/2021 - Present        Thrush of mouth and esophagus (HCC) ICD-10-CM: B37.81, B37.0  ICD-9-CM: 112.84, 112.0  3/2/2021 - Present        ESRD (end stage renal disease) on dialysis [facility]) ICD-10-CM: N18.6, Z99.2  ICD-9-CM: 585.6, V45.11  12/28/2020 - Present        History of DVT (deep vein thrombosis) ICD-10-CM: U27.878  ICD-9-CM: V12.51  12/28/2020 - Present        Hypothyroidism ICD-10-CM: E03.9  ICD-9-CM: 244.9  12/24/2020 - Present        Vertigo ICD-10-CM: R42  ICD-9-CM: 780.4  12/24/2020 - Present        Calculus of gallbladder with acute cholecystitis without obstruction ICD-10-CM: K80.00  ICD-9-CM: 574.00  10/9/2020 - Present        Acute recurrent maxillary sinusitis ICD-10-CM: J01.01  ICD-9-CM: 461.0  8/6/2020 - Present        Ulcer ICD-10-CM: CPY3228  ICD-9-CM: 707.9  Unknown - Present        Obesity ICD-10-CM: E66.9  ICD-9-CM: 278.00  Unknown - Present        Migraine ICD-10-CM: T71.951  ICD-9-CM: 346.90  Unknown - Present        Hypertension ICD-10-CM: I10  ICD-9-CM: 401.9  Unknown - Present        Hypercholesteremia ICD-10-CM: E78.00  ICD-9-CM: 272.0  Unknown - Present        GERD (gastroesophageal reflux disease) ICD-10-CM: K21.9  ICD-9-CM: 530.81  Unknown - Present        Burning with urination ICD-10-CM: R30.0  ICD-9-CM: 788.1  Unknown - Present    Overview Signed 2/13/2017 12:02 PM by Breanne Cronin A     frequent uti             Arrhythmia ICD-10-CM: I49.9  ICD-9-CM: 427.9  Unknown - Present        Hyperkalemia ICD-10-CM: E87.5  ICD-9-CM: 276.7  11/13/2016 - Present        Pruritus ICD-10-CM: L29.9  ICD-9-CM: 698.9  9/29/2016 - Present        Chronic kidney disease, stage III (moderate) ([facility] ICD-10-CM: N18.30  ICD-9-CM: 585.3  9/27/2016 - Present        Recurrent urinary tract infection ICD-10-CM: N39.0  ICD-9-CM: 599.0  9/27/2016 - Present        Nausea and vomiting ICD-10-CM: R11.2  ICD-9-CM: 787.01  4/10/2016 - Present        Diverticulitis of intestine ICD-10-CM: K57.92  ICD-9-CM: 562.11  4/2/2016 - Present        Cystic disease of kidney ICD-10-CM: Q61.9  ICD-9-CM: 753.10  3/16/2015 - Present        Gastroenteritis ICD-10-CM: K52.9  ICD-9-CM: 558.9  2/23/2015 - Present        Anemia ICD-10-CM: D64.9  ICD-9-CM: 285.9  11/10/2014 - Present        Disease due to gram-negative bacillus ICD-10-CM: B96.89  ICD-9-CM: 041.85  10/17/2014 - Present    Overview Signed 2/13/2017 11:58 AM by Breanne LLANOS     Overview:   Acinetobacter baumannii Septicemia and UTI 10/14/2014 (cultures at [facility])             Fever ICD-10-CM: R50.9  ICD-9-CM: 780.60  10/14/2014 - Present        Drug-induced hyperglycemia ICD-10-CM: R73.9, T50.905A  ICD-9-CM: 790.29, E947.9  6/28/2014 - Present        Exacerbation of asthma ICD-10-CM: J45.901  ICD-9-CM: 493.92  6/25/2014 - Present        Systemic inflammatory response syndrome (SIRS) (HCC) ICD-10-CM: R65.10  ICD-9-CM: 995.90  6/23/2014 - Present        Kidney transplant rejection ICD-10-CM: T86.11  ICD-9-CM: 996.81  4/10/2014 - Present    Overview Signed 2/13/2017 11:58 AM by Patricia Schmidt     Overview:   Collected: Damian Munoz Dr: Camilla Kathleen MD    Case Number: [id number]  [address] [place] [address]    Case Number: [id number]    DIAGNOSIS:  ----------  ALLOGRAFT KIDNEY, NEEDLE BIOPSY (14 YEARS, 2 MONTHS):  - SUBOPTIMAL SPECIMEN: RENAL MEDULLA, INCLUDING DEEP MEDULLA WITH  FOCAL BENIGN UROTHELIAL EPITHELIUM. - MILD NEUTROPHILIC TUBULITIS WITH INTRALUMINAL NEUTROPHILS,  CORRELATE WITH URINE CULTURE TO RULE OUT BACTERIAL INFECTION. - MILD LYMPHOCYTIC TUBULITIS CONSISTENT WITH BORDERLINE CHANGE:  (\"SUSPICIOUS\" FOR ACUTE CELLULAR REJECTION) AS PER BANFF SCHEMA. - CONGESTED PERITUBULAR CAPILLARIES (NON-SPECIFIC), BUT RENAL  VEIN THROMBOSIS OR STENOSIS SHOULD BE EXCLUDED CLINICALLY. - FOCAL SMALL INTERSTITIAL COLLECTION OF CAST MATERIAL  (NON-SPECIFIC), BUT  URINARY OBSTRUCTION SHOULD BE EXCLUDED CLINICALLY. - NO DEFINITIVE STAINING FOR POLYOMAVIRUSES (SEE DESCRIPTION). - FOCAL C4D REACTIVITY ALONG PERITUBULAR CAPILLARY WALLS WITH  HIGH NON-SPECIFIC BACKGROUND STAINING; SIGNIFICANCE UNCERTAIN AND  CORRELATION WITH FLOW PRA IS SUGGESTED TO EXCLUDE EARLY HUMORAL  REJECTION. - SMALL VESSEL SCLEROSIS, MILD TO MODERATE TO SEVERE.  - TUBULAR ATROPHY AND INTERSTITIAL FIBROSIS CANNOT BE ADEQUATELY  ASSESSED  IN THE ABSENCE OF RENAL CORTEX.     Re-Bx - Collected: Carmelita Medina Dr: Camilla Kathleen MD    Case Number: [id number]  [address] [place] [address]    Case Number: [id number]    DIAGNOSIS:  ----------  ALLOGRAFT KIDNEY, NEEDLE BIOPSY (14 YEARS, 2 MONTHS):  - BORDERLINE CHANGE (\"SUSPICIOUS\" FOR ACUTE CELLULAR REJECTION)  TO FOCAL  MILD ACUTE CELLULAR REJECTION (BANFF TYPE 1A) (SEE COMMENT). - MILD NEUTROPHILIC INTERSTITIAL INFLAMMATION, CORRELATE WITH  URINE CULTURE  TO RULE OUT SUPERIMPOSED BACTERIAL INFECTION. - SMALL INTERSTITIAL COLLECTIONS OF PAS-POSITIVE CAST MATERIAL  AND CYSTICALLY DILATED PACKER'S SPACE WITH PAS-POSITIVE  PROTEINACEOUS FILTRATE (SEE  COMMENT). - ACUTE ISCHEMIC-TYPE TUBULAR INJURY IS NOTED.  - FOCAL C4D REACTIVITY ALONG PERITUBULAR CAPILLARY WALLS;  SIGNIFICANCE  UNCERTAIN AND CORRELATION WITH FLOW PRA IS SUGGESTED TO EXCLUDE  EARLY  HUMORAL REJECTION. - NEGATIVE IMMUNOSTAIN FOR POLYOMAVIRUSES (BK, HOMER). - CHRONIC CHANGES: GLOBAL SCLEROSIS IN APPROXIMATELY 14 OUT OF 60  (23%)  GLOMERULI, FOCAL SEGMENTAL GLOMERULOSCLEROSIS IN APPROXIMATELY  2 OUT OF  46 (4%) NON-OBSOLESCENT GLOMERULI, MODERATE TO SEVERE  ARTERIOSCLEROSIS,  MILD TO MODERATE TO SEVERE ARTERIOLAR HYALINE SCLEROSIS, AND  MODERATE  INTERSTITIAL FIBROSIS/TUBULAR ATROPHY (SEE COMMENT).              Acute renal failure ([facility] ICD-10-CM: N17.9  ICD-9-CM: 584.9  4/7/2014 - Present        Renal transplant disorder ICD-10-CM: T86.10  ICD-9-CM: 996.81  4/7/2014 - Present        Systemic infection ([facility] ICD-10-CM: A41.9  ICD-9-CM: 038.9  1/7/2014 - Present        Asthma ICD-10-CM: J45.909  ICD-9-CM: 493.90  8/21/2013 - Present        Chronic obstructive pulmonary disease ([facility] ICD-10-CM: J44.9  ICD-9-CM: 381  8/21/2013 - Present        Diverticular disease of large intestine ICD-10-CM: K57.30  ICD-9-CM: 562.10  8/21/2013 - Present        Essential hypertension ICD-10-CM: I10  ICD-9-CM: 401.9  8/21/2013 - Present        Gastroesophageal reflux disease ICD-10-CM: K21.9  ICD-9-CM: 530.81  8/21/2013 - Present        Seasonal allergic rhinitis due to pollen ICD-10-CM: J30.1  ICD-9-CM: 477.0  8/21/2013 - Present        Hyperlipidemia ICD-10-CM: E78.5  ICD-9-CM: 272.4  8/21/2013 - Present        UTI (urinary tract infection) ICD-10-CM: N39.0  ICD-9-CM: 599.0  8/21/2013 - Present        Fecal incontinence ICD-10-CM: R15.9  ICD-9-CM: 787.60  4/17/2013 - Present        History of kidney transplant ICD-10-CM: Z94.0  ICD-9-CM: V42.0  4/30/2012 - Present        CMV (cytomegalovirus) antibody positive ICD-10-CM: R76.8  ICD-9-CM: 795.79  4/30/2012 - Present    Overview Signed 2/13/2017 11:58 AM by Kennedy LLANOS     Overview:   Donor negative             Hematuria ICD-10-CM: R31.9  ICD-9-CM: 599.70  4/30/2012 - Present        Migraine without status migrainosus, not intractable ICD-10-CM: O40.797  ICD-9-CM: 346.90  2/24/2010 - Present        Adiposity ICD-10-CM: E66.9  ICD-9-CM: 278.00  2/1/2010 - Present        Renal cyst ICD-10-CM: N28.1  ICD-9-CM: 753.10  1/1/2002 - Present    Overview Signed 2/13/2017 12:01 PM by Kennedy LLANOS     kidney transplant                    Greater [id number] minutes were spent with the patient on counseling and coordination of care    Signed:   Michelet Salcedo MD  6/13/2021  11:04 AM

## 2021-06-13 NOTE — PROGRESS NOTES
Problem: Risk for Spread of Infection  Goal: Prevent transmission of infectious organism to others  Description: Prevent the transmission of infectious organisms to other patients, staff members, and visitors. Outcome: Resolved/Met     Problem: Patient Education:  Go to Education Activity  Goal: Patient/Family Education  Outcome: Resolved/Met     Problem: Pressure Injury - Risk of  Goal: *Prevention of pressure injury  Description: Document Quinn Scale and appropriate interventions in the flowsheet. Outcome: Resolved/Met     Problem: Patient Education: Go to Patient Education Activity  Goal: Patient/Family Education  Outcome: Resolved/Met     Problem: General Medical Care Plan  Goal: *Vital signs within specified parameters  Outcome: Resolved/Met  Goal: *Labs within defined limits  Outcome: Resolved/Met  Goal: *Absence of infection signs and symptoms  Outcome: Resolved/Met  Goal: *Optimal pain control at patient's stated goal  Outcome: Resolved/Met  Goal: *Skin integrity maintained  Outcome: Resolved/Met  Goal: *Fluid volume balance  Outcome: Resolved/Met  Goal: *Optimize nutritional status  Outcome: Resolved/Met  Goal: *Anxiety reduced or absent  Outcome: Resolved/Met  Goal: *Progressive mobility and function (eg: ADL's)  Outcome: Resolved/Met     Problem: Patient Education: Go to Patient Education Activity  Goal: Patient/Family Education  Outcome: Resolved/Met     Problem: Falls - Risk of  Goal: *Absence of Falls  Description: Document Coleen Fall Risk and appropriate interventions in the flowsheet.   Outcome: Resolved/Met     Problem: Patient Education: Go to Patient Education Activity  Goal: Patient/Family Education  Outcome: Resolved/Met

## 2021-06-13 NOTE — ROUTINE PROCESS
Pt called and stated she thinks she had a small bowel movement. Pt was cleaned of a very small amount of yellow stool and then repositioned for comfort.

## 2021-06-13 NOTE — PROGRESS NOTES
Reason for Admission: Chart reviewed and noted patient presented with C/O vomiting and diarrhea. Pt states the onset of vomiting and diarrhea was approximately 2 weeks ago. She was admitted to [facility] from 5/28/2021 to 6/2/2021 for vomiting and diarrhea and dx with Sepsis secondary to pyelonephritis and gastroenteritis. Pt was also noted to have an elevated trop at the time and thought to have an infection from an old transplanted kidney. She was than discharged from [facility] with ABX. She now C/O diffuse abdominal pain with vomiting and diarrhea that has gotten worse. Pt states it makes her feel weaker, so she was unable to go to dialysis. Dx: Acute Hyperkalemia, Intractable N&V, Acute on chronic diarrhea    PMH:  Renal Transplant in 2002 with rejection, Currently on M-W-F dialysis for the past 2 yrs, COPD, HTN, HLD, A-Fib, H/O DVT, CMV Ab+, GI ulcer, Hypothyroid                     RUR Score: 35%                 PCP: First and Last name:   Julio Salazar MD     Name of Practice:    Are you a current patient: Yes/No:    Approximate date of last visit:    Can you participate in a virtual visit if needed:     Do you (patient/family) have any concerns for transition/discharge? No                  Plan for utilizing home health: TBD      Current Advanced Directive/Advance Care Plan:  Full Code- No ACP      Healthcare Decision Maker: Angelia Manriquez   Click here to complete [address] including selection of the Healthcare Decision Maker Relationship (ie \"Primary\")              Transition of Care Plan: TBD    Care Management Interventions  PCP Verified by CM: Yes  Transition of Care Consult (CM Consult):  Discharge Planning    Current Support Network: [employer]- [phone]. Patient's sister lives at home with patient. She has a wheeled walker that she uses PRN. She receives HH with [facility]. She will be returning back home with [facility] at discharge.  Her sister will be picking her up to take her home at discharge.

## 2021-06-13 NOTE — ROUTINE PROCESS
Called provider and advised of adverse drug warning for possible QT prolongation with Amiodarone, Cipro, Warfarin, and  PO Levaquin. After review, new order received to hold Levaquin. Pt C/O 10/10 sharp constant pain in her abdomen in lower quadrants. Received a new order for 0.5mg IVP Dilaudid now. Pt's right hand and wrist where #20 gauge PIV is edematous and painful to touch. PIV removed. No new potential access in right arm seen by staff nurses. Left arm has pt's fistula in her upper arm.  Called nursing supervisor to assist.

## 2021-06-13 NOTE — ROUTINE PROCESS
Lab in to draw blood for AM labs. Phlebotomist was only able too get 1ml of blood from the pt. Only BMP will be resulted. Provider aware. Pt asked for the bed pan but did not urinate. Pt assisted with repositioning.

## 2021-06-13 NOTE — ROUTINE PROCESS
Verbal shift change report given to ALIYAH Zuñiga (oncoming nurse) by BONNIE Manriquez RN (offgoing nurse). Report included the following information SBAR, Intake/Output, Recent Results and Med Rec Status.

## 2021-06-13 NOTE — ROUTINE PROCESS
Pt back from dialysis and oriented to room and call bell system. Per dialysis nurse, 2L were taken off. Shift assessment completed. Pt A&Ox4, has a flat affect, and speaks very quietly. Lungs clear, bowel sounds present. No skin breakdown. Abdomen tender in lower quadrants. , pushes weak. CBWR.

## 2021-06-14 ENCOUNTER — PATIENT OUTREACH (OUTPATIENT)
Dept: CASE MANAGEMENT | Age: 66
End: 2021-06-14

## 2021-06-14 LAB
FOLATE SERPL-MCNC: 23.4 NG/ML (ref 5–21)
VIT B12 SERPL-MCNC: 523 PG/ML (ref 193–986)

## 2021-06-14 NOTE — PROGRESS NOTES
Care Transitions Initial Call    Call within 2 business days of discharge: Yes     Patient: Ajit Evangelista Patient : 1955 MRN: [id number]    Last Discharge [address]       Complaint Diagnosis Description Type Department Provider    21 Diarrhea; Vomiting Acute hyperkalemia . .. ED to Hosp-Admission (Discharged) (ADMIT) [id number] Tacho Carrillo MD; Claire Shanks. .. Was this an external facility discharge? No Discharge Facility: [facility]    Call placed to patient at only listed number. Attempted to reach patient for hospital follow up for CONSUELO. No answer and there was no way to leave a message because mailbox is full.     [facility] follow up appointment(s):   Future Appointments   Date Time Provider Elena Coello   2021  2:15 PM Kelly Tirado MD [facility] BS AMB

## 2021-06-15 ENCOUNTER — PATIENT OUTREACH (OUTPATIENT)
Dept: CASE MANAGEMENT | Age: 66
End: 2021-06-15

## 2021-06-15 LAB — WBC,FECAL,WBCF: NORMAL /HPF (ref 0–4)

## 2021-06-15 NOTE — PROGRESS NOTES
Care Transitions Initial Call    Call within 2 business days of discharge: Yes     Patient: Davon Garcia Patient : 1955 MRN: [id number]    Last Discharge [address]       Complaint Diagnosis Description Type Department Provider    21 Diarrhea; Vomiting Acute hyperkalemia . .. ED to Hosp-Admission (Discharged) (ADMIT) [id number] Gabrielle Davenport MD; Mariza Christiansen. .. Was this an external facility discharge? No Discharge Facility: [facility]    Call placed to patient at only listed number. Attempted to reach patient for hospital follow up for CONSUELO. No answer and there was no way to leave a message because mailbox is full and the voicemail on the mobil number is not set up.     [facility] follow up appointment(s):   Future Appointments   Date Time Provider Elena Coello   2021  2:15 PM Marylene France, MD [facility] BS AMB

## 2021-06-15 NOTE — PROGRESS NOTES
.Date/Time:  6/15/2021 12:51 PM    Method of communication with patient:phone    [facility] ( [facility]) made out reach to  patient by telephone for f/u Complex Case Management  ( CCM). Phone was answered but disconnected before [facility] could asked for patient by name.

## 2021-06-16 ENCOUNTER — TELEPHONE (OUTPATIENT)
Dept: FAMILY MEDICINE CLINIC | Age: 66
End: 2021-06-16

## 2021-06-16 DIAGNOSIS — B37.0 THRUSH OF MOUTH AND ESOPHAGUS (HCC): Primary | ICD-10-CM

## 2021-06-16 DIAGNOSIS — B37.81 THRUSH OF MOUTH AND ESOPHAGUS (HCC): Primary | ICD-10-CM

## 2021-06-16 RX ORDER — FLUCONAZOLE 150 MG/1
150 TABLET ORAL DAILY
Qty: 4 TABLET | Refills: 0 | Status: SHIPPED | OUTPATIENT
Start: 2021-06-16 | End: 2021-07-20

## 2021-06-17 ENCOUNTER — OFFICE VISIT (OUTPATIENT)
Dept: GASTROENTEROLOGY | Age: 66
End: 2021-06-17
Payer: MEDICARE

## 2021-06-17 VITALS
BODY MASS INDEX: 29.11 KG/M2 | HEIGHT: 61 IN | TEMPERATURE: 98.2 F | DIASTOLIC BLOOD PRESSURE: 60 MMHG | OXYGEN SATURATION: 99 % | SYSTOLIC BLOOD PRESSURE: 140 MMHG | HEART RATE: 75 BPM | RESPIRATION RATE: 14 BRPM | WEIGHT: 154.2 LBS

## 2021-06-17 DIAGNOSIS — N18.6 ESRD (END STAGE RENAL DISEASE) ON DIALYSIS (HCC): ICD-10-CM

## 2021-06-17 DIAGNOSIS — R13.19 ESOPHAGEAL DYSPHAGIA: Primary | ICD-10-CM

## 2021-06-17 DIAGNOSIS — K21.9 GASTROESOPHAGEAL REFLUX DISEASE, UNSPECIFIED WHETHER ESOPHAGITIS PRESENT: ICD-10-CM

## 2021-06-17 DIAGNOSIS — Z94.0 HISTORY OF KIDNEY TRANSPLANT: ICD-10-CM

## 2021-06-17 DIAGNOSIS — Z99.2 ESRD (END STAGE RENAL DISEASE) ON DIALYSIS (HCC): ICD-10-CM

## 2021-06-17 DIAGNOSIS — Z86.718 HISTORY OF DVT (DEEP VEIN THROMBOSIS): ICD-10-CM

## 2021-06-17 PROCEDURE — G9899 SCRN MAM PERF RSLTS DOC: HCPCS | Performed by: INTERNAL MEDICINE

## 2021-06-17 PROCEDURE — 3017F COLORECTAL CA SCREEN DOC REV: CPT | Performed by: INTERNAL MEDICINE

## 2021-06-17 PROCEDURE — G8419 CALC BMI OUT NRM PARAM NOF/U: HCPCS | Performed by: INTERNAL MEDICINE

## 2021-06-17 PROCEDURE — 99204 OFFICE O/P NEW MOD 45 MIN: CPT | Performed by: INTERNAL MEDICINE

## 2021-06-17 PROCEDURE — G8510 SCR DEP NEG, NO PLAN REQD: HCPCS | Performed by: INTERNAL MEDICINE

## 2021-06-17 PROCEDURE — G9231 DOC ESRD DIA TRANS PREG: HCPCS | Performed by: INTERNAL MEDICINE

## 2021-06-17 PROCEDURE — 1111F DSCHRG MED/CURRENT MED MERGE: CPT | Performed by: INTERNAL MEDICINE

## 2021-06-17 PROCEDURE — G8427 DOCREV CUR MEDS BY ELIG CLIN: HCPCS | Performed by: INTERNAL MEDICINE

## 2021-06-17 NOTE — PROGRESS NOTES
1. Have you been to the ER, urgent care clinic since your last visit? Hospitalized since your last visit? YES [facility]  N&v  6-    2. Have you seen or consulted any other health care providers outside of the [address] [place] [address] since your last visit? Include any pap smears or colon screening.   No   Chief Complaint   Patient presents with    GERD    New Patient     Just was D / C from [facility] 6-  see notes    had  CT Scan    also dialysis patient    Nausea     N&V resolved     Visit Vitals  BP (!) 140/60 (BP 1 Location: Right upper arm, BP Patient Position: Sitting, BP Cuff Size: Adult)   Pulse 75   Temp 98.2 °F (36.8 °C) (Temporal)   Resp 14   Ht 5' 1\" (1.549 m)   Wt 69.9 kg (154 lb 3.2 oz)   SpO2 99% Comment: room air   BMI 29.14 kg/m²

## 2021-06-17 NOTE — PROGRESS NOTES
Sonali Wilson is a 63 y.o. female who presents today for the following:  Chief Complaint   Patient presents with    GERD    New Patient     Just was D / C from [facility] 6-  see notes    had  CT Scan    also dialysis patient    Nausea     N&V resolved         Allergies   Allergen Reactions    Aspirin Rash and Unknown (comments)    Bactrim [Sulfamethoxazole-Trimethoprim] Rash and Unknown (comments)    Bromfenac Rash and Unknown (comments)    Cefepime Other (comments)     Neurological reaction    Ceftriaxone Rash    Copper Rash    Ibuprofen Rash and Unknown (comments)    Ketorolac Tromethamine Rash and Unknown (comments)    Morphine Rash and Unknown (comments)    Relafen [Nabumetone] Rash and Unknown (comments)    Rifampin Rash and Unknown (comments)    Vancomycin Rash and Unknown (comments)     Pt reports causes itching, takes benadryl prior to use. Pt denies anaphylaxis. Requires benadryl with each vancomycin dose       Current Outpatient Medications   Medication Sig    fluconazole (DIFLUCAN) 150 mg tablet Take 1 Tablet by mouth daily for 4 days. FDA advises cautious prescribing of oral fluconazole in pregnancy.  RenaPlex-D 800 mcg-12.5 mg -2,000 unit tab Take 1 Tablet by mouth daily.  oxyCODONE-acetaminophen (PERCOCET) 5-325 mg per tablet Take 1 Tablet by mouth every six (6) hours as needed.  sevelamer carbonate (RENVELA) 800 mg tab tab Take 800 mg by mouth three (3) times daily.  zolpidem (AMBIEN) 5 mg tablet Take 5 mg by mouth nightly as needed.  ondansetron (Zofran ODT) 4 mg disintegrating tablet 1 Tablet by SubLINGual route every eight (8) hours as needed for Nausea or Vomiting.  levothyroxine (SYNTHROID) 50 mcg tablet TAKE 1 TABLET BY MOUTH EVERY DAY    famotidine (PEPCID) 20 mg tablet Take 1 Tab by mouth two (2) times a day.     meclizine (ANTIVERT) 25 mg tablet TAKE 1 TABLET BY MOUTH THREE TIMES DAILY FOR UP TO 10 DAYS AS NEEDED FOR DIZZINESS    dicyclomine (BENTYL) 10 mg capsule TAKE 1 CAPSULE BY MOUTH FOUR TIMES DAILY    sucralfate (CARAFATE) 1 gram tablet TAKE 1 TABLET BY MOUTH FOUR TIMES DAILY    fluticasone propion-salmeteroL (ADVAIR/WIXELA) 250-50 mcg/dose diskus inhaler Take 1 Puff by inhalation every twelve (12) hours.  carvediloL (COREG) 6.25 mg tablet Take 1 Tab by mouth two (2) times daily (with meals).  valGANciclovir (VALCYTE) 450 mg tablet Take 2 Tabs by mouth daily.  simvastatin (ZOCOR) 20 mg tablet TAKE 1 TABLET BY MOUTH DAILY AS DIRECTED.  sertraline (ZOLOFT) 50 mg tablet Take 1 Tab by mouth daily.  albuterol (PROVENTIL VENTOLIN) 2.5 mg /3 mL (0.083 %) nebu USE 1 VIAL VIA NEBULIZER THREE TIMES DAILY    amiodarone (CORDARONE) 200 mg tablet TAKE 1 TABLET BY MOUTH EVERY DAY    warfarin (COUMADIN) 2 mg tablet Take 3 Tabs by mouth daily. Indications: blood clot in a deep vein of the extremities (Patient taking differently: Take 2 mg by mouth two (2) times a day. 2 tabs  Indications: blood clot in a deep vein of the extremities)    Dexilant 60 mg CpDB capsule (delayed release) TAKE ONE CAPSULE BY MOUTH EVERY DAY    amLODIPine (NORVASC) 10 mg tablet TAKE 1 TABLET BY MOUTH EVERY DAY (Patient taking differently: 5 mg daily. Indications: high blood pressure)    omeprazole (PRILOSEC) 20 mg capsule TAKE ONE CAPSULE BY MOUTH TWICE DAILY    aluminum & magnesium hydroxide-simethicone (Maalox Maximum Strength) 400-400-40 mg/5 mL suspension Take 10 mL by mouth every six (6) hours as needed for Indigestion.  ESTRACE 0.01 % (0.1 mg/gram) vaginal cream INSERT 2 GRAMS INTO VAGINA EVERY MONDAY AND THURSDAY    cranberry extract 425 mg cap 425 mg.  LORazepam (ATIVAN) 0.5 mg tablet 0.5 mg.    fluticasone (FLONASE) 50 mcg/actuation nasal spray 1 Spray.  hyoscyamine SL (LEVSIN/SL) 0.125 mg SL tablet 0.125 mg by SubLINGual route every four (4) hours as needed.  montelukast (SINGULAIR) 10 mg tablet Take 10 mg by mouth daily.       raNITIdine hcl 300 mg cap Take 300 mg by mouth daily (after breakfast). (Patient not taking: Reported on 6/17/2021)    metoprolol tartrate (LOPRESSOR) 50 mg tablet Take 50 mg by mouth daily. (Patient not taking: Reported on 6/17/2021)    EPINEPHrine (EPIPEN) 0.3 mg/0.3 mL injection 0.3 mg. No current facility-administered medications for this visit.        Past Medical History:   Diagnosis Date    Anemia NEC     Arrhythmia     Asthma     Asthma     Burning with urination     frequent uti    CMV (cytomegalovirus) antibody positive     Dialysis patient ([facility] [address]     M/W/F    Dyspepsia and other specified disorders of function of stomach     stomach ulcer    Essential hypertension     GERD (gastroesophageal reflux disease)     Hypercholesteremia     Hypertension     Migraine     Obesity     Renal cyst 2002    kidney transplant     Ulcer        Past Surgical History:   Procedure Laterality Date    COLONOSCOPY      Dr Temo Keith  5yrs ago    ENDOSCOPY VISIT-OUTPATIENT      Dr Temo Keith  5 yrs ago    ENDOSCOPY, COLON, DIAGNOSTIC      with Dr. Bryan Ar HX [facility]  02/2021    HX GI      ulcer    HX HEENT      sinus surg    HX RENAL TRANSPLANT      VASCULAR SURGERY PROCEDURE UNLIST      shunt in prep for dialysis       Family History   Problem Relation Age of Onset    Colon Polyps Brother     Cancer Mother     Diabetes Father        Social History     Socioeconomic History    Marital status:      Spouse name: Not on file    Number of children: Not on file    Years of education: Not on file    Highest education level: Not on file   Occupational History    Not on file   Tobacco Use    Smoking status: Never Smoker    Smokeless tobacco: Never Used   Vaping Use    Vaping Use: Never used   Substance and Sexual Activity    Alcohol use: No    Drug use: No    Sexual activity: Not Currently   Other Topics Concern    Not on file   Social History Narrative    Not on file     Social Determinants of Health     Financial Resource Strain:     Difficulty of Paying Living Expenses:    Food Insecurity:     Worried About Running Out of Food in the Last Year:     [address] in the Last Year:    Transportation Needs:     Lack of Transportation (Medical):  Lack of Transportation (Non-Medical):    Physical Activity:     Days of Exercise per Week:     Minutes of Exercise per Session:    Stress:     Feeling of Stress :    Social Connections:     Frequency of Communication with Friends and Family:     Frequency of Social Gatherings with Friends and Family:     Attends Mu-ism Services:     Active Member of Clubs or Organizations:     Attends Club or Organization Meetings:     Marital Status:    Intimate Partner Violence:     Fear of Current or Ex-Partner:     Emotionally Abused:     Physically Abused:     Sexually Abused:          HPI  72-year-old female with history of hypertension, hyperlipidemia, atrial fibrillation, DVT, end-stage renal disease dialysis dependent, status post renal transplant with rejection, gastroesophageal reflux disease, hypothyroidism, and peptic ulcer disease who comes in for evaluation of the GERD and dysphagia. Patient states she has a lot of problems with her swallowing. This is especially true for pills and some foods with may stop in her throat. She has to cough a lot which is worse at night. She states it has been present for a long time. She has had heartburn and indigestion. She is on treatment with plan 60 mg daily. She last had an EGD many years ago. She has epigastric abdominal pain. Bowel movements are regular and formed. No gross GI bleeding. No black or tarry stools. Her last colonoscopy was 4 years ago and no polyps were found at that time. And has recently had 2 hospitalizations for nausea vomiting and diarrhea. She was found to have a kidney infection with sepsis. Review of Systems   Constitutional: Negative.     HENT: Negative. Negative for nosebleeds. Eyes: Negative. Respiratory: Negative. Cardiovascular: Negative. Gastrointestinal: Positive for abdominal pain and heartburn. Negative for blood in stool, constipation, diarrhea, melena, nausea and vomiting. Genitourinary: Negative. Musculoskeletal: Positive for joint pain. Skin: Negative. Neurological: Negative. Endo/Heme/Allergies: Negative. Psychiatric/Behavioral: Negative. All other systems reviewed and are negative. Visit Vitals  BP (!) 140/60 (BP 1 Location: Right upper arm, BP Patient Position: Sitting, BP Cuff Size: Adult)   Pulse 75   Temp 98.2 °F (36.8 °C) (Temporal)   Resp 14   Ht 5' 1\" (1.549 m)   Wt 69.9 kg (154 lb 3.2 oz)   SpO2 99% Comment: room air   BMI 29.14 kg/m²     Physical Exam  Vitals and nursing note reviewed. Constitutional:       General: She is not in acute distress. Appearance: Normal appearance. She is not ill-appearing. HENT:      Head: Normocephalic and atraumatic. Nose: Nose normal.      Mouth/Throat:      Mouth: Mucous membranes are moist.      Pharynx: Oropharynx is clear. Eyes:      General: No scleral icterus. Conjunctiva/sclera: Conjunctivae normal.      Pupils: Pupils are equal, round, and reactive to light. Cardiovascular:      Rate and Rhythm: Normal rate and regular rhythm. Pulses: Normal pulses. Heart sounds: Normal heart sounds. Pulmonary:      Effort: Pulmonary effort is normal.      Breath sounds: Normal breath sounds. Abdominal:      General: Bowel sounds are normal. There is no distension. Palpations: Abdomen is soft. There is no mass. Tenderness: There is no abdominal tenderness. There is no right CVA tenderness, left CVA tenderness, guarding or rebound. Hernia: No hernia is present. Musculoskeletal:         General: Normal range of motion. Cervical back: Normal range of motion and neck supple. Skin:     General: Skin is warm and dry. Coloration: Skin is not jaundiced. Neurological:      General: No focal deficit present. Mental Status: She is alert and oriented to person, place, and time. Psychiatric:         Mood and Affect: Mood normal.         Behavior: Behavior normal.         Thought Content: Thought content normal.         Judgment: Judgment normal.            1. Esophageal dysphagia  We will perform an EGD to further evaluate her issues with swallowing.  - UPPER GI ENDOSCOPY,DIAGNOSIS; Future  - IL DILATE ESOPHAGUS  - NOVEL CORONAVIRUS (COVID-19); Future    2. Gastroesophageal reflux disease, unspecified whether esophagitis present    - UPPER GI ENDOSCOPY,DIAGNOSIS; Future  - NOVEL CORONAVIRUS (COVID-19); Future    3. History of kidney transplant  Patient has had rejection many years ago    4. ESRD (end stage renal disease) on dialysis ([facility] [address]      5.  History of DVT (deep vein thrombosis)

## 2021-06-17 NOTE — H&P (VIEW-ONLY)
Willie Vora is a 63 y.o. female who presents today for the following:  Chief Complaint   Patient presents with    GERD    New Patient     Just was D / C from [facility]-  see notes    had  CT Scan    also dialysis patient    Nausea     N&V resolved         Allergies   Allergen Reactions    Aspirin Rash and Unknown (comments)    Bactrim [Sulfamethoxazole-Trimethoprim] Rash and Unknown (comments)    Bromfenac Rash and Unknown (comments)    Cefepime Other (comments)     Neurological reaction    Ceftriaxone Rash    Copper Rash    Ibuprofen Rash and Unknown (comments)    Ketorolac Tromethamine Rash and Unknown (comments)    Morphine Rash and Unknown (comments)    Relafen [Nabumetone] Rash and Unknown (comments)    Rifampin Rash and Unknown (comments)    Vancomycin Rash and Unknown (comments)     Pt reports causes itching, takes benadryl prior to use. Pt denies anaphylaxis. Requires benadryl with each vancomycin dose       Current Outpatient Medications   Medication Sig    fluconazole (DIFLUCAN) 150 mg tablet Take 1 Tablet by mouth daily for 4 days. FDA advises cautious prescribing of oral fluconazole in pregnancy.  RenaPlex-D 800 mcg-12.5 mg -2,000 unit tab Take 1 Tablet by mouth daily.  oxyCODONE-acetaminophen (PERCOCET) 5-325 mg per tablet Take 1 Tablet by mouth every six (6) hours as needed.  sevelamer carbonate (RENVELA) 800 mg tab tab Take 800 mg by mouth three (3) times daily.  zolpidem (AMBIEN) 5 mg tablet Take 5 mg by mouth nightly as needed.  ondansetron (Zofran ODT) 4 mg disintegrating tablet 1 Tablet by SubLINGual route every eight (8) hours as needed for Nausea or Vomiting.  levothyroxine (SYNTHROID) 50 mcg tablet TAKE 1 TABLET BY MOUTH EVERY DAY    famotidine (PEPCID) 20 mg tablet Take 1 Tab by mouth two (2) times a day.     meclizine (ANTIVERT) 25 mg tablet TAKE 1 TABLET BY MOUTH THREE TIMES DAILY FOR UP TO 10 DAYS AS NEEDED FOR DIZZINESS    dicyclomine (BENTYL) 10 mg capsule TAKE 1 CAPSULE BY MOUTH FOUR TIMES DAILY    sucralfate (CARAFATE) 1 gram tablet TAKE 1 TABLET BY MOUTH FOUR TIMES DAILY    fluticasone propion-salmeteroL (ADVAIR/WIXELA) 250-50 mcg/dose diskus inhaler Take 1 Puff by inhalation every twelve (12) hours.  carvediloL (COREG) 6.25 mg tablet Take 1 Tab by mouth two (2) times daily (with meals).  valGANciclovir (VALCYTE) 450 mg tablet Take 2 Tabs by mouth daily.  simvastatin (ZOCOR) 20 mg tablet TAKE 1 TABLET BY MOUTH DAILY AS DIRECTED.  sertraline (ZOLOFT) 50 mg tablet Take 1 Tab by mouth daily.  albuterol (PROVENTIL VENTOLIN) 2.5 mg /3 mL (0.083 %) nebu USE 1 VIAL VIA NEBULIZER THREE TIMES DAILY    amiodarone (CORDARONE) 200 mg tablet TAKE 1 TABLET BY MOUTH EVERY DAY    warfarin (COUMADIN) 2 mg tablet Take 3 Tabs by mouth daily. Indications: blood clot in a deep vein of the extremities (Patient taking differently: Take 2 mg by mouth two (2) times a day. 2 tabs  Indications: blood clot in a deep vein of the extremities)    Dexilant 60 mg CpDB capsule (delayed release) TAKE ONE CAPSULE BY MOUTH EVERY DAY    amLODIPine (NORVASC) 10 mg tablet TAKE 1 TABLET BY MOUTH EVERY DAY (Patient taking differently: 5 mg daily. Indications: high blood pressure)    omeprazole (PRILOSEC) 20 mg capsule TAKE ONE CAPSULE BY MOUTH TWICE DAILY    aluminum & magnesium hydroxide-simethicone (Maalox Maximum Strength) 400-400-40 mg/5 mL suspension Take 10 mL by mouth every six (6) hours as needed for Indigestion.  ESTRACE 0.01 % (0.1 mg/gram) vaginal cream INSERT 2 GRAMS INTO VAGINA EVERY MONDAY AND THURSDAY    cranberry extract 425 mg cap 425 mg.  LORazepam (ATIVAN) 0.5 mg tablet 0.5 mg.    fluticasone (FLONASE) 50 mcg/actuation nasal spray 1 Spray.  hyoscyamine SL (LEVSIN/SL) 0.125 mg SL tablet 0.125 mg by SubLINGual route every four (4) hours as needed.  montelukast (SINGULAIR) 10 mg tablet Take 10 mg by mouth daily.       raNITIdine hcl 300 mg cap Take 300 mg by mouth daily (after breakfast). (Patient not taking: Reported on 6/17/2021)    metoprolol tartrate (LOPRESSOR) 50 mg tablet Take 50 mg by mouth daily. (Patient not taking: Reported on 6/17/2021)    EPINEPHrine (EPIPEN) 0.3 mg/0.3 mL injection 0.3 mg. No current facility-administered medications for this visit.        Past Medical History:   Diagnosis Date    Anemia NEC     Arrhythmia     Asthma     Asthma     Burning with urination     frequent uti    CMV (cytomegalovirus) antibody positive     Dialysis patient ([facility] [address]     M/W/F    Dyspepsia and other specified disorders of function of stomach     stomach ulcer    Essential hypertension     GERD (gastroesophageal reflux disease)     Hypercholesteremia     Hypertension     Migraine     Obesity     Renal cyst 2002    kidney transplant     Ulcer        Past Surgical History:   Procedure Laterality Date    COLONOSCOPY      Dr Virginia Wright  5yrs ago    ENDOSCOPY VISIT-OUTPATIENT      Dr Virginia Wright  5 yrs ago    ENDOSCOPY, COLON, DIAGNOSTIC      with Dr. Cinthia Cuba HX [facility]  02/2021    HX GI      ulcer    HX HEENT      sinus surg    HX RENAL TRANSPLANT      VASCULAR SURGERY PROCEDURE UNLIST      shunt in prep for dialysis       Family History   Problem Relation Age of Onset    Colon Polyps Brother     Cancer Mother     Diabetes Father        Social History     Socioeconomic History    Marital status:      Spouse name: Not on file    Number of children: Not on file    Years of education: Not on file    Highest education level: Not on file   Occupational History    Not on file   Tobacco Use    Smoking status: Never Smoker    Smokeless tobacco: Never Used   Vaping Use    Vaping Use: Never used   Substance and Sexual Activity    Alcohol use: No    Drug use: No    Sexual activity: Not Currently   Other Topics Concern    Not on file   Social History Narrative    Not on file     Social Determinants of Health     Financial Resource Strain:     Difficulty of Paying Living Expenses:    Food Insecurity:     Worried About Running Out of Food in the Last Year:     [address] in the Last Year:    Transportation Needs:     Lack of Transportation (Medical):  Lack of Transportation (Non-Medical):    Physical Activity:     Days of Exercise per Week:     Minutes of Exercise per Session:    Stress:     Feeling of Stress :    Social Connections:     Frequency of Communication with Friends and Family:     Frequency of Social Gatherings with Friends and Family:     Attends Druze Services:     Active Member of Clubs or Organizations:     Attends Club or Organization Meetings:     Marital Status:    Intimate Partner Violence:     Fear of Current or Ex-Partner:     Emotionally Abused:     Physically Abused:     Sexually Abused:          HPI  72-year-old female with history of hypertension, hyperlipidemia, atrial fibrillation, DVT, end-stage renal disease dialysis dependent, status post renal transplant with rejection, gastroesophageal reflux disease, hypothyroidism, and peptic ulcer disease who comes in for evaluation of the GERD and dysphagia. Patient states she has a lot of problems with her swallowing. This is especially true for pills and some foods with may stop in her throat. She has to cough a lot which is worse at night. She states it has been present for a long time. She has had heartburn and indigestion. She is on treatment with plan 60 mg daily. She last had an EGD many years ago. She has epigastric abdominal pain. Bowel movements are regular and formed. No gross GI bleeding. No black or tarry stools. Her last colonoscopy was 4 years ago and no polyps were found at that time. And has recently had 2 hospitalizations for nausea vomiting and diarrhea. She was found to have a kidney infection with sepsis. Review of Systems   Constitutional: Negative.     HENT: Negative. Negative for nosebleeds. Eyes: Negative. Respiratory: Negative. Cardiovascular: Negative. Gastrointestinal: Positive for abdominal pain and heartburn. Negative for blood in stool, constipation, diarrhea, melena, nausea and vomiting. Genitourinary: Negative. Musculoskeletal: Positive for joint pain. Skin: Negative. Neurological: Negative. Endo/Heme/Allergies: Negative. Psychiatric/Behavioral: Negative. All other systems reviewed and are negative. Visit Vitals  BP (!) 140/60 (BP 1 Location: Right upper arm, BP Patient Position: Sitting, BP Cuff Size: Adult)   Pulse 75   Temp 98.2 °F (36.8 °C) (Temporal)   Resp 14   Ht 5' 1\" (1.549 m)   Wt 69.9 kg (154 lb 3.2 oz)   SpO2 99% Comment: room air   BMI 29.14 kg/m²     Physical Exam  Vitals and nursing note reviewed. Constitutional:       General: She is not in acute distress. Appearance: Normal appearance. She is not ill-appearing. HENT:      Head: Normocephalic and atraumatic. Nose: Nose normal.      Mouth/Throat:      Mouth: Mucous membranes are moist.      Pharynx: Oropharynx is clear. Eyes:      General: No scleral icterus. Conjunctiva/sclera: Conjunctivae normal.      Pupils: Pupils are equal, round, and reactive to light. Cardiovascular:      Rate and Rhythm: Normal rate and regular rhythm. Pulses: Normal pulses. Heart sounds: Normal heart sounds. Pulmonary:      Effort: Pulmonary effort is normal.      Breath sounds: Normal breath sounds. Abdominal:      General: Bowel sounds are normal. There is no distension. Palpations: Abdomen is soft. There is no mass. Tenderness: There is no abdominal tenderness. There is no right CVA tenderness, left CVA tenderness, guarding or rebound. Hernia: No hernia is present. Musculoskeletal:         General: Normal range of motion. Cervical back: Normal range of motion and neck supple. Skin:     General: Skin is warm and dry. Coloration: Skin is not jaundiced. Neurological:      General: No focal deficit present. Mental Status: She is alert and oriented to person, place, and time. Psychiatric:         Mood and Affect: Mood normal.         Behavior: Behavior normal.         Thought Content: Thought content normal.         Judgment: Judgment normal.            1. Esophageal dysphagia  We will perform an EGD to further evaluate her issues with swallowing.  - UPPER GI ENDOSCOPY,DIAGNOSIS; Future  - WI DILATE ESOPHAGUS  - NOVEL CORONAVIRUS (COVID-19); Future    2. Gastroesophageal reflux disease, unspecified whether esophagitis present    - UPPER GI ENDOSCOPY,DIAGNOSIS; Future  - NOVEL CORONAVIRUS (COVID-19); Future    3. History of kidney transplant  Patient has had rejection many years ago    4. ESRD (end stage renal disease) on dialysis ([facility] [address]      5.  History of DVT (deep vein thrombosis)

## 2021-06-18 DIAGNOSIS — K21.9 GASTROESOPHAGEAL REFLUX DISEASE, UNSPECIFIED WHETHER ESOPHAGITIS PRESENT: ICD-10-CM

## 2021-06-18 DIAGNOSIS — R13.19 ESOPHAGEAL DYSPHAGIA: ICD-10-CM

## 2021-06-18 NOTE — PROGRESS NOTES
EGD AND ED ARE SCHEDULED FOR 7-. PATIENT WILL GET COVID TEST AT THE SURGICAL [facility] ON 7-7-2021  PENDING [address] #[id number].

## 2021-06-20 RX ORDER — WARFARIN 2 MG/1
TABLET ORAL
Qty: 30 TABLET | Refills: 0 | Status: SHIPPED | OUTPATIENT
Start: 2021-06-20 | End: 2021-07-14

## 2021-06-21 LAB
BACTERIA SPEC CULT: NORMAL
SPECIAL REQUESTS,SREQ: NORMAL

## 2021-06-24 ENCOUNTER — PATIENT OUTREACH (OUTPATIENT)
Dept: CASE MANAGEMENT | Age: 66
End: 2021-06-24

## 2021-06-24 NOTE — PROGRESS NOTES
Care Transitions Initial Call    Call within 2 business days of discharge: Yes     Patient: Arabella Cat Patient : 1955 MRN: [id number]    Last Discharge [address]       Complaint Diagnosis Description Type Department Provider    21 Diarrhea; Vomiting Acute hyperkalemia . .. ED to Hosp-Admission (Discharged) (ADMIT) [id number] Philomena Sheets MD; Kamilla Velasquez. .. Was this an external facility discharge? No Discharge Facility: [facility]    Call placed to patient at only listed number. Attempted to reach patient for hospital follow up for [facility]. No answer and there was no way to leave a message because mailbox is full and the voicemail on the mobil number is not set up. [facility] follow up appointment(s):   No future appointments. Unable to reach patient x 3 attempts. Letter sent to patient and episode closed.

## 2021-06-24 NOTE — LETTER
Dear Araceli Morin    My name is [facility] [place] [facility] and I am a Care Transition Nurse who partners with (practice or provider Chris Villanueva MD to improve patients' health. I've been trying to reach you via phone to let you know that, as a member of your care team, I will work with other providers involved in your care, offer education for your specific health conditions, and connect you with more resources as needed. This program is designed to provide you with the opportunity to have a ([id number] [facility]/[address]) care manager partner with you for the following situations:     1) if you come home from the hospital or emergency room   2) to help manage your disease   3) when you would like assistance coordinating services or appointments    This added support is provided at no additional cost to you. My primary focus is to help you achieve specific goals and improve your health. Please call me at [phone] to further discuss how I can support your health care needs.     Sincerely,    Jackalyn Severance, RN  Care Transitions Nurse  [phone]

## 2021-06-25 ENCOUNTER — HOSPITAL ENCOUNTER (EMERGENCY)
Age: 66
Discharge: HOME | End: 2021-06-25
Attending: EMERGENCY MEDICINE
Payer: MEDICARE

## 2021-06-25 VITALS
BODY MASS INDEX: 27.6 KG/M2 | HEIGHT: 62 IN | WEIGHT: 150 LBS | TEMPERATURE: 98.9 F | OXYGEN SATURATION: 99 % | HEART RATE: 88 BPM | DIASTOLIC BLOOD PRESSURE: 54 MMHG | RESPIRATION RATE: 18 BRPM | SYSTOLIC BLOOD PRESSURE: 162 MMHG

## 2021-06-25 DIAGNOSIS — R11.2 NAUSEA AND VOMITING, INTRACTABILITY OF VOMITING NOT SPECIFIED, UNSPECIFIED VOMITING TYPE: ICD-10-CM

## 2021-06-25 DIAGNOSIS — N18.6 CKD (CHRONIC KIDNEY DISEASE) REQUIRING CHRONIC DIALYSIS (HCC): ICD-10-CM

## 2021-06-25 DIAGNOSIS — D64.9 CHRONIC ANEMIA: ICD-10-CM

## 2021-06-25 DIAGNOSIS — R53.83 FATIGUE, UNSPECIFIED TYPE: Primary | ICD-10-CM

## 2021-06-25 DIAGNOSIS — Z99.2 CKD (CHRONIC KIDNEY DISEASE) REQUIRING CHRONIC DIALYSIS (HCC): ICD-10-CM

## 2021-06-25 LAB
ALBUMIN SERPL-MCNC: 3.6 G/DL (ref 3.5–4.7)
ALBUMIN/GLOB SERPL: 1.3 {RATIO}
ALP SERPL-CCNC: 54 U/L (ref 38–126)
ALT SERPL-CCNC: 16 U/L (ref 3–52)
ANION GAP SERPL CALC-SCNC: 12 MMOL/L
AST SERPL W P-5'-P-CCNC: 23 U/L (ref 14–74)
BASOPHILS # BLD: 0.1 K/UL (ref 0–0.1)
BASOPHILS NFR BLD: 1 % (ref 0–2)
BILIRUB SERPL-MCNC: 0.6 MG/DL (ref 0.2–1)
BUN SERPL-MCNC: 32 MG/DL (ref 9–21)
BUN/CREAT SERPL: 5
CA-I BLD-MCNC: 8.2 MG/DL (ref 8.5–10.5)
CHLORIDE SERPL-SCNC: 98 MMOL/L (ref 94–111)
CO2 SERPL-SCNC: 26 MMOL/L (ref 21–33)
CREAT SERPL-MCNC: 6.4 MG/DL (ref 0.7–1.2)
EOSINOPHIL # BLD: 0 K/UL (ref 0–0.4)
EOSINOPHIL NFR BLD: 0 % (ref 0–5)
ERYTHROCYTE [DISTWIDTH] IN BLOOD BY AUTOMATED COUNT: 17.1 % (ref 11.6–14.5)
GLOBULIN SER CALC-MCNC: 2.8 G/DL
GLUCOSE SERPL-MCNC: 58 MG/DL (ref 70–110)
HCT VFR BLD AUTO: 22.6 % (ref 35–45)
HGB BLD-MCNC: 7.4 G/DL (ref 12–16)
IMM GRANULOCYTES # BLD AUTO: 0 K/UL
IMM GRANULOCYTES NFR BLD AUTO: 0 %
LIPASE SERPL-CCNC: 83 U/L (ref 10–57)
LYMPHOCYTES # BLD: 2 K/UL (ref 0.9–3.6)
LYMPHOCYTES NFR BLD: 36 % (ref 21–52)
MCH RBC QN AUTO: 35.2 PG (ref 24–34)
MCHC RBC AUTO-ENTMCNC: 32.7 G/DL (ref 31–37)
MCV RBC AUTO: 107.6 FL (ref 74–97)
MONOCYTES # BLD: 0.2 K/UL (ref 0.05–1.2)
MONOCYTES NFR BLD: 3 % (ref 3–10)
NEUTS SEG # BLD: 3.3 K/UL (ref 1.8–8)
NEUTS SEG NFR BLD: 60 % (ref 40–73)
PLATELET # BLD AUTO: 226 K/UL (ref 135–420)
PMV BLD AUTO: 9.2 FL (ref 9.2–11.8)
POTASSIUM SERPL-SCNC: 3.3 MMOL/L (ref 3.2–5.1)
PROT SERPL-MCNC: 6.4 G/DL (ref 6.1–8.4)
RBC # BLD AUTO: 2.1 M/UL (ref 4.2–5.3)
SODIUM SERPL-SCNC: 136 MMOL/L (ref 135–145)
WBC # BLD AUTO: 5.5 K/UL (ref 4.6–13.2)

## 2021-06-25 PROCEDURE — 83690 ASSAY OF LIPASE: CPT

## 2021-06-25 PROCEDURE — 36415 COLL VENOUS BLD VENIPUNCTURE: CPT

## 2021-06-25 PROCEDURE — 80053 COMPREHEN METABOLIC PANEL: CPT

## 2021-06-25 PROCEDURE — 74011250637 HC RX REV CODE- 250/637: Performed by: EMERGENCY MEDICINE

## 2021-06-25 PROCEDURE — 85025 COMPLETE CBC W/AUTO DIFF WBC: CPT

## 2021-06-25 PROCEDURE — 99284 EMERGENCY DEPT VISIT MOD MDM: CPT

## 2021-06-25 RX ORDER — ONDANSETRON 4 MG/1
4 TABLET, ORALLY DISINTEGRATING ORAL
Status: COMPLETED | OUTPATIENT
Start: 2021-06-25 | End: 2021-06-25

## 2021-06-25 RX ORDER — ONDANSETRON 4 MG/1
4 TABLET, FILM COATED ORAL
Qty: 10 TABLET | Refills: 0 | Status: ON HOLD | OUTPATIENT
Start: 2021-06-25 | End: 2021-08-10

## 2021-06-25 RX ORDER — ONDANSETRON 2 MG/ML
4 INJECTION INTRAMUSCULAR; INTRAVENOUS
Status: DISCONTINUED | OUTPATIENT
Start: 2021-06-25 | End: 2021-06-25

## 2021-06-25 RX ADMIN — ONDANSETRON 4 MG: 4 TABLET, ORALLY DISINTEGRATING ORAL at 18:27

## 2021-06-25 NOTE — ED TRIAGE NOTES
Pt states after dialysis today she felt weak and decided to call EMS. Pt also states the past 3 weeks she has not had much of an appetite and has not been eating a lot.

## 2021-06-25 NOTE — ED PROVIDER NOTES
EMERGENCY DEPARTMENT HISTORY AND PHYSICAL EXAM      Date: 6/25/2021  Patient Name: Phuong Mims      History of Presenting Illness     Chief Complaint   Patient presents with    Fatigue       History Provided By: Patient    HPI: Phuong Mims, 63 y.o. female with a past medical history significant hypertension, hyperlipidemia, obesity, renal insufficiency and ESRF on dialysis presents to the ED with cc of Fatigue and nausea after dialysis today. Had gastritis 2 weeks ago which resolved. Has poor appetite    There are no other complaints, changes, or physical findings at this time. PCP: Korina Lewis MD    Current Outpatient Medications   Medication Sig Dispense Refill    ondansetron hcl (ZOFRAN) 4 mg tablet Take 1 Tablet by mouth every eight (8) hours as needed for Nausea, Vomiting or Nausea or Vomiting. 10 Tablet 0    warfarin (COUMADIN) 2 mg tablet TAKE 3 TABLETS BY MOUTH EVERY DAY 30 Tablet 0    RenaPlex-D 800 mcg-12.5 mg -2,000 unit tab Take 1 Tablet by mouth daily.  sevelamer carbonate (RENVELA) 800 mg tab tab Take 800 mg by mouth three (3) times daily.  zolpidem (AMBIEN) 5 mg tablet Take 5 mg by mouth nightly as needed.  ondansetron (Zofran ODT) 4 mg disintegrating tablet 1 Tablet by SubLINGual route every eight (8) hours as needed for Nausea or Vomiting. 20 Tablet 0    levothyroxine (SYNTHROID) 50 mcg tablet TAKE 1 TABLET BY MOUTH EVERY DAY 90 Tab 3    raNITIdine hcl 300 mg cap Take 300 mg by mouth daily (after breakfast). (Patient not taking: Reported on 6/17/2021) 30 Cap 1    famotidine (PEPCID) 20 mg tablet Take 1 Tab by mouth two (2) times a day.  30 Tab 1    meclizine (ANTIVERT) 25 mg tablet TAKE 1 TABLET BY MOUTH THREE TIMES DAILY FOR UP TO 10 DAYS AS NEEDED FOR DIZZINESS 21 Tab 0    dicyclomine (BENTYL) 10 mg capsule TAKE 1 CAPSULE BY MOUTH FOUR TIMES DAILY 120 Cap 1    sucralfate (CARAFATE) 1 gram tablet TAKE 1 TABLET BY MOUTH FOUR TIMES DAILY 360 Tab 0    fluticasone propion-salmeteroL (ADVAIR/WIXELA) 250-50 mcg/dose diskus inhaler Take 1 Puff by inhalation every twelve (12) hours. 1 Inhaler 3    carvediloL (COREG) 6.25 mg tablet Take 1 Tab by mouth two (2) times daily (with meals). 180 Tab 2    valGANciclovir (VALCYTE) 450 mg tablet Take 2 Tabs by mouth daily. 90 Tab 5    simvastatin (ZOCOR) 20 mg tablet TAKE 1 TABLET BY MOUTH DAILY AS DIRECTED. 90 Tab 1    sertraline (ZOLOFT) 50 mg tablet Take 1 Tab by mouth daily. 90 Tab 0    albuterol (PROVENTIL VENTOLIN) 2.5 mg /3 mL (0.083 %) nebu USE 1 VIAL VIA NEBULIZER THREE TIMES DAILY 90 mL 3    amiodarone (CORDARONE) 200 mg tablet TAKE 1 TABLET BY MOUTH EVERY DAY 90 Tab 2    Dexilant 60 mg CpDB capsule (delayed release) TAKE ONE CAPSULE BY MOUTH EVERY DAY 30 Cap 5    amLODIPine (NORVASC) 10 mg tablet TAKE 1 TABLET BY MOUTH EVERY DAY (Patient taking differently: 5 mg daily. Indications: high blood pressure) 90 Tab 3    omeprazole (PRILOSEC) 20 mg capsule TAKE ONE CAPSULE BY MOUTH TWICE DAILY 180 Cap 2    metoprolol tartrate (LOPRESSOR) 50 mg tablet Take 50 mg by mouth daily. (Patient not taking: Reported on 6/17/2021)      aluminum & magnesium hydroxide-simethicone (Maalox Maximum Strength) 400-400-40 mg/5 mL suspension Take 10 mL by mouth every six (6) hours as needed for Indigestion. 250 mL 0    ESTRACE 0.01 % (0.1 mg/gram) vaginal cream INSERT 2 GRAMS INTO VAGINA EVERY MONDAY AND THURSDAY 42.5 g 5    cranberry extract 425 mg cap 425 mg.  LORazepam (ATIVAN) 0.5 mg tablet 0.5 mg.      fluticasone (FLONASE) 50 mcg/actuation nasal spray 1 Spray.  EPINEPHrine (EPIPEN) 0.3 mg/0.3 mL injection 0.3 mg.      hyoscyamine SL (LEVSIN/SL) 0.125 mg SL tablet 0.125 mg by SubLINGual route every four (4) hours as needed.  montelukast (SINGULAIR) 10 mg tablet Take 10 mg by mouth daily.            Past History     Past Medical History:  Past Medical History:   Diagnosis Date    Anemia NEC     Arrhythmia     Asthma     Asthma     Burning with urination     frequent uti    CMV (cytomegalovirus) antibody positive     Dialysis patient ([facility] [address]     M/W/F    Dyspepsia and other specified disorders of function of stomach     stomach ulcer    Essential hypertension     GERD (gastroesophageal reflux disease)     Hypercholesteremia     Hypertension     Migraine     Obesity     Renal cyst 2002    kidney transplant     Ulcer        Past Surgical History:  Past Surgical History:   Procedure Laterality Date    COLONOSCOPY      Dr Oneal Holter  5yrs ago    ENDOSCOPY VISIT-OUTPATIENT      Dr Oneal Holter  5 yrs ago    ENDOSCOPY, COLON, DIAGNOSTIC      with Dr. Chelo Santana HX [facility]  02/2021    HX GI      ulcer    HX HEENT      sinus surg    HX RENAL TRANSPLANT      VASCULAR SURGERY PROCEDURE UNLIST      shunt in prep for dialysis       Family History:  Family History   Problem Relation Age of Onset    Colon Polyps Brother     Cancer Mother     Diabetes Father        Social History:  Social History     Tobacco Use    Smoking status: Never Smoker    Smokeless tobacco: Never Used   Vaping Use    Vaping Use: Never used   Substance Use Topics    Alcohol use: No    Drug use: No       Allergies: Allergies   Allergen Reactions    Aspirin Rash and Unknown (comments)    Bactrim [Sulfamethoxazole-Trimethoprim] Rash and Unknown (comments)    Bromfenac Rash and Unknown (comments)    Cefepime Other (comments)     Neurological reaction    Ceftriaxone Rash    Copper Rash    Ibuprofen Rash and Unknown (comments)    Ketorolac Tromethamine Rash and Unknown (comments)    Morphine Rash and Unknown (comments)    Relafen [Nabumetone] Rash and Unknown (comments)    Rifampin Rash and Unknown (comments)    Vancomycin Rash and Unknown (comments)     Pt reports causes itching, takes benadryl prior to use. Pt denies anaphylaxis.     Requires benadryl with each vancomycin dose         Review of Systems     Review of Systems Constitutional: Positive for appetite change and fatigue. HENT: Negative. Respiratory: Negative. Cardiovascular: Negative. Gastrointestinal: Positive for nausea. Genitourinary: Negative. Musculoskeletal: Negative. All other systems reviewed and are negative. Physical Exam     Physical Exam  Vitals and nursing note reviewed. Constitutional:       General: She is not in acute distress. Appearance: Normal appearance. She is not ill-appearing. HENT:      Head: Normocephalic. Eyes:      Extraocular Movements: Extraocular movements intact. Pupils: Pupils are equal, round, and reactive to light. Cardiovascular:      Rate and Rhythm: Normal rate and regular rhythm. Pulses: Normal pulses. Heart sounds: Normal heart sounds. Pulmonary:      Effort: Pulmonary effort is normal.      Breath sounds: Normal breath sounds. Abdominal:      General: Abdomen is flat. Palpations: Abdomen is soft. Tenderness: There is no abdominal tenderness. Musculoskeletal:         General: Normal range of motion. Cervical back: Normal range of motion and neck supple. Comments: LUE AV Fistula   Skin:     General: Skin is warm and dry. Neurological:      General: No focal deficit present. Mental Status: She is alert and oriented to person, place, and time.          Lab and Diagnostic Study Results     Labs -     Recent Results (from the past 12 hour(s))   CBC WITH AUTOMATED DIFF    Collection Time: 06/25/21  6:19 PM   Result Value Ref Range    WBC 5.5 4.6 - 13.2 K/uL    RBC 2.10 (L) 4.20 - 5.30 M/uL    HGB 7.4 (L) 12.0 - 16.0 g/dL    HCT 22.6 (L) 35.0 - 45.0 %    .6 (H) 74.0 - 97.0 FL    MCH 35.2 (H) 24.0 - 34.0 PG    MCHC 32.7 31.0 - 37.0 g/dL    RDW 17.1 (H) 11.6 - 14.5 %    PLATELET 072 816 - 307 K/uL    MPV 9.2 9.2 - 11.8 FL    NEUTROPHILS 60 40 - 73 %    LYMPHOCYTES 36 21 - 52 %    MONOCYTES 3 3 - 10 %    EOSINOPHILS 0 0 - 5 %    BASOPHILS 1 0 - 2 % IMMATURE GRANULOCYTES 0 %    ABS. NEUTROPHILS 3.3 1.8 - 8.0 K/UL    ABS. LYMPHOCYTES 2.0 0.9 - 3.6 K/UL    ABS. MONOCYTES 0.2 0.05 - 1.2 K/UL    ABS. EOSINOPHILS 0.0 0.0 - 0.4 K/UL    ABS. BASOPHILS 0.1 0.0 - 0.1 K/UL    ABS. IMM. GRANS. 0.0 K/UL   METABOLIC PANEL, COMPREHENSIVE    Collection Time: 06/25/21  6:19 PM   Result Value Ref Range    Sodium 136 135 - 145 mmol/L    Potassium 3.3 3.2 - 5.1 mmol/L    Chloride 98 94 - 111 mmol/L    CO2 26 21 - 33 mmol/L    Anion gap 12 mmol/L    Glucose 58 (L) 70 - 110 mg/dL    BUN 32 (H) 9 - 21 mg/dL    Creatinine 6.40 (H) 0.70 - 1.20 mg/dL    BUN/Creatinine ratio 5      GFR est AA 8 ml/min/1.73m2    GFR est non-AA 7 ml/min/1.73m2    Calcium 8.2 (L) 8.5 - 10.5 mg/dL    Bilirubin, total 0.6 0.2 - 1.0 mg/dL    AST (SGOT) 23 14 - 74 U/L    ALT (SGPT) 16 3 - 52 U/L    Alk. phosphatase 54 38 - 126 U/L    Protein, total 6.4 6.1 - 8.4 g/dL    Albumin 3.6 3.5 - 4.7 g/dL    Globulin 2.8 g/dL    A-G Ratio 1.3     LIPASE    Collection Time: 06/25/21  6:19 PM   Result Value Ref Range    Lipase 83 (H) 10 - 57 U/L       Radiologic Studies -   [unfilled]  CT Results  (Last 48 hours)    None        CXR Results  (Last 48 hours)    None          Medical Decision Making and ED Course   - I am the first and primary provider for this patient AND AM THE PRIMARY PROVIDER OF RECORD. - I reviewed the vital signs, available nursing notes, past medical history, past surgical history, family history and social history. - Initial assessment performed. The patients presenting problems have been discussed, and the staff are in agreement with the care plan formulated and outlined with them. I have encouraged them to ask questions as they arise throughout their visit. Vital Signs-Reviewed the patient's vital signs.     Patient Vitals for the past 12 hrs:   Temp Pulse Resp BP SpO2   06/25/21 1850  88 18 (!) 162/54 99 %   06/25/21 1746 98.9 °F (37.2 °C) 84 17 (!) 150/58 100 %           Records Reviewed: Nursing Notes    The patient presents with     ED Course:              Provider Notes (Medical Decision Making): MDM           Consultations:       Consultations:         Procedures and Critical Care       Performed by: Joe Ward MD  PROCEDURES:  Procedures         HYPERTENSION COUNSELING: Education was provided to the patient today regarding their hypertension. Patient is made aware of their elevated blood pressure and is instructed to follow up this week with their Primary Care for a recheck. Patient is counseled regarding consequences of chronic, uncontrolled hypertension including kidney disease, heart disease, stroke or even death. Patient states their understanding and agrees to follow up this week. Additionally, during their visit, I discussed sodium restriction, maintaining ideal body weight and regular exercise program as physiologic means to achieve blood pressure control. The patient will strive towards this. Disposition     Disposition: DC- Adult Discharges: All of the diagnostic tests were reviewed and questions answered. Diagnosis, care plan and treatment options were discussed. The patient understands the instructions and will follow up as directed. The patients results have been reviewed with them. They have been counseled regarding their diagnosis. The patient verbally convey understanding and agreement of the signs, symptoms, diagnosis, treatment and prognosis and additionally agrees to follow up as recommended with their PCP in 24 - 48 hours. They also agree with the care-plan and convey that all of their questions have been answered.   I have also put together some discharge instructions for them that include: 1) educational information regarding their diagnosis, 2) how to care for their diagnosis at home, as well a 3) list of reasons why they would want to return to the ED prior to their follow-up appointment, should their condition change. Discharged    Remove if not discharged  DISCHARGE PLAN:  1. Current Discharge Medication List      CONTINUE these medications which have NOT CHANGED    Details   warfarin (COUMADIN) 2 mg tablet TAKE 3 TABLETS BY MOUTH EVERY DAY  Qty: 30 Tablet, Refills: 0      RenaPlex-D 800 mcg-12.5 mg -2,000 unit tab Take 1 Tablet by mouth daily. sevelamer carbonate (RENVELA) 800 mg tab tab Take 800 mg by mouth three (3) times daily. zolpidem (AMBIEN) 5 mg tablet Take 5 mg by mouth nightly as needed. ondansetron (Zofran ODT) 4 mg disintegrating tablet 1 Tablet by SubLINGual route every eight (8) hours as needed for Nausea or Vomiting. Qty: 20 Tablet, Refills: 0      levothyroxine (SYNTHROID) 50 mcg tablet TAKE 1 TABLET BY MOUTH EVERY DAY  Qty: 90 Tab, Refills: 3      raNITIdine hcl 300 mg cap Take 300 mg by mouth daily (after breakfast). Qty: 30 Cap, Refills: 1    Associated Diagnoses: Gastroesophageal reflux disease with esophagitis without hemorrhage      famotidine (PEPCID) 20 mg tablet Take 1 Tab by mouth two (2) times a day. Qty: 30 Tab, Refills: 1    Associated Diagnoses: Gastroesophageal reflux disease without esophagitis      meclizine (ANTIVERT) 25 mg tablet TAKE 1 TABLET BY MOUTH THREE TIMES DAILY FOR UP TO 10 DAYS AS NEEDED FOR DIZZINESS  Qty: 21 Tab, Refills: 0    Associated Diagnoses: Dizziness      dicyclomine (BENTYL) 10 mg capsule TAKE 1 CAPSULE BY MOUTH FOUR TIMES DAILY  Qty: 120 Cap, Refills: 1      sucralfate (CARAFATE) 1 gram tablet TAKE 1 TABLET BY MOUTH FOUR TIMES DAILY  Qty: 360 Tab, Refills: 0      fluticasone propion-salmeteroL (ADVAIR/WIXELA) 250-50 mcg/dose diskus inhaler Take 1 Puff by inhalation every twelve (12) hours. Qty: 1 Inhaler, Refills: 3    Associated Diagnoses: Seasonal allergic rhinitis due to pollen      carvediloL (COREG) 6.25 mg tablet Take 1 Tab by mouth two (2) times daily (with meals).   Qty: 180 Tab, Refills: 2    Associated Diagnoses: Hypertension, unspecified type      valGANciclovir (VALCYTE) 450 mg tablet Take 2 Tabs by mouth daily. Qty: 90 Tab, Refills: 5    Associated Diagnoses: ESRD (end stage renal disease) on dialysis (HCC)      simvastatin (ZOCOR) 20 mg tablet TAKE 1 TABLET BY MOUTH DAILY AS DIRECTED. Qty: 90 Tab, Refills: 1      sertraline (ZOLOFT) 50 mg tablet Take 1 Tab by mouth daily. Qty: 90 Tab, Refills: 0      albuterol (PROVENTIL VENTOLIN) 2.5 mg /3 mL (0.083 %) nebu USE 1 VIAL VIA NEBULIZER THREE TIMES DAILY  Qty: 90 mL, Refills: 3      amiodarone (CORDARONE) 200 mg tablet TAKE 1 TABLET BY MOUTH EVERY DAY  Qty: 90 Tab, Refills: 2      Dexilant 60 mg CpDB capsule (delayed release) TAKE ONE CAPSULE BY MOUTH EVERY DAY  Qty: 30 Cap, Refills: 5      amLODIPine (NORVASC) 10 mg tablet TAKE 1 TABLET BY MOUTH EVERY DAY  Qty: 90 Tab, Refills: 3      omeprazole (PRILOSEC) 20 mg capsule TAKE ONE CAPSULE BY MOUTH TWICE DAILY  Qty: 180 Cap, Refills: 2      metoprolol tartrate (LOPRESSOR) 50 mg tablet Take 50 mg by mouth daily. aluminum & magnesium hydroxide-simethicone (Maalox Maximum Strength) 400-400-40 mg/5 mL suspension Take 10 mL by mouth every six (6) hours as needed for Indigestion. Qty: 250 mL, Refills: 0      ESTRACE 0.01 % (0.1 mg/gram) vaginal cream INSERT 2 GRAMS INTO VAGINA EVERY MONDAY AND THURSDAY  Qty: 42.5 g, Refills: 5      cranberry extract 425 mg cap 425 mg. LORazepam (ATIVAN) 0.5 mg tablet 0.5 mg.      fluticasone (FLONASE) 50 mcg/actuation nasal spray 1 Spray. EPINEPHrine (EPIPEN) 0.3 mg/0.3 mL injection 0.3 mg.      hyoscyamine SL (LEVSIN/SL) 0.125 mg SL tablet 0.125 mg by SubLINGual route every four (4) hours as needed. montelukast (SINGULAIR) 10 mg tablet Take 10 mg by mouth daily. 2.   Follow-up Information     Follow up With Specialties Details Why Rolanda Ford MD Family Medicine In 2 days  [address] [facility]  [phone]          3.   Return to ED if worse   4. Current Discharge Medication List      START taking these medications    Details   ondansetron hcl (ZOFRAN) 4 mg tablet Take 1 Tablet by mouth every eight (8) hours as needed for Nausea, Vomiting or Nausea or Vomiting. Qty: 10 Tablet, Refills: 0  Start date: 6/25/2021             Diagnosis     Clinical Impression:   1. Fatigue, unspecified type    2. Chronic anemia    3. CKD [facility]chronic kidney disease) [facility]    4. Nausea and vomiting, intractability of vomiting not specified, unspecified vomiting type        Attestations:    Jaxon Upton MD    Please note that this dictation was completed with [employer], the computer voice recognition software. Quite often unanticipated grammatical, syntax, homophones, and other interpretive errors are inadvertently transcribed by the computer software. Please disregard these errors. Please excuse any errors that have escaped final proofreading. Thank you.

## 2021-06-26 ENCOUNTER — PATIENT OUTREACH (OUTPATIENT)
Dept: CASE MANAGEMENT | Age: 66
End: 2021-06-26

## 2021-06-26 NOTE — PROGRESS NOTES
Care Transitions Nurse (CTN) attempted to contact patient for COVID at risk and ED visit follow up. Her daughter answered the phone number listed as patient's mobile number. She states she spoke to the patient just now and the patient is \"so so\", still lying in bed. Patient does not have an up-to-date PHI RAJI on file, so CTN did not provide any HIPAA protected information. Her daughter states she will let the patient know that this CTN tried contacting her. 4:17 PM  Care Transitions Nurse (CTN) contacted patient for COVID at risk and ED visit follow up. Verified name and . Patient reports her stomach feels \"uneasy\" today, but denies any vomiting. She has had diarrhea today. She drank 1/2 glass of juice today and has not had anything else to eat or drink. Reviewed the importance of hydration with electrolyte replenishments drinks like Gatorade or Pedialyte. Encouraged her to continue to sip on fluids throughout the day as well. She states a plan to contact her PCP on Monday to schedule an appt. Notified her of on-call provider via her PCP office available after hours and weekends. Educated patient about risk for severe COVID-19 due to risk factors according to CDC guidelines. CTN reviewed discharge instructions, medical action plan and red flag symptoms with the patient who verbalized understanding. Discussed COVID vaccination status: yes, patient is not vaccinated and states she was advised not to get the vaccine due to her multiple medication allergies. Education provided on COVID-19 vaccination as appropriate. Discussed exposure protocols and quarantine with CDC Guidelines. Patient was given an opportunity to verbalize any questions and concerns and agrees to contact CTN or health care provider for questions related to their healthcare.

## 2021-07-02 ENCOUNTER — PATIENT OUTREACH (OUTPATIENT)
Dept: CASE MANAGEMENT | Age: 66
End: 2021-07-02

## 2021-07-02 DIAGNOSIS — J30.1 SEASONAL ALLERGIC RHINITIS DUE TO POLLEN: ICD-10-CM

## 2021-07-02 NOTE — PROGRESS NOTES
.Date/Time:  7/2/2021 3:12 PM    Method of communication with patient:phone    [facility] ( [facility]) made out reach to  patient by telephone to offer Complex Case Management  ( CCM). [facility] has attempted to engage patient in CCM. Spoke to daughter who gave [facility] patients' correct number. Patient is not engaged at this time. CCM calls will be excluded x 90 days .

## 2021-07-03 RX ORDER — FLUTICASONE PROPIONATE AND SALMETEROL 250; 50 UG/1; UG/1
POWDER RESPIRATORY (INHALATION)
Qty: 60 EACH | Refills: 3 | Status: SHIPPED | OUTPATIENT
Start: 2021-07-03 | End: 2022-01-20

## 2021-07-08 ENCOUNTER — HOSPITAL ENCOUNTER (OUTPATIENT)
Dept: LAB | Age: 66
Discharge: HOME | End: 2021-07-08
Payer: MEDICARE

## 2021-07-08 PROCEDURE — U0003 INFECTIOUS AGENT DETECTION BY NUCLEIC ACID (DNA OR RNA); SEVERE ACUTE RESPIRATORY SYNDROME CORONAVIRUS 2 (SARS-COV-2) (CORONAVIRUS DISEASE [COVID-19]), AMPLIFIED PROBE TECHNIQUE, MAKING USE OF HIGH THROUGHPUT TECHNOLOGIES AS DESCRIBED BY CMS-2020-01-R: HCPCS

## 2021-07-09 LAB — SARS-COV-2, COV2NT: NOT DETECTED

## 2021-07-13 ENCOUNTER — ANESTHESIA EVENT (OUTPATIENT)
Dept: ENDOSCOPY | Age: 66
End: 2021-07-13
Payer: MEDICARE

## 2021-07-13 ENCOUNTER — ANESTHESIA (OUTPATIENT)
Dept: ENDOSCOPY | Age: 66
End: 2021-07-13
Payer: MEDICARE

## 2021-07-13 ENCOUNTER — HOSPITAL ENCOUNTER (OUTPATIENT)
Age: 66
Setting detail: OUTPATIENT SURGERY
Discharge: HOME | End: 2021-07-13
Attending: INTERNAL MEDICINE | Admitting: INTERNAL MEDICINE
Payer: MEDICARE

## 2021-07-13 VITALS
BODY MASS INDEX: 28.7 KG/M2 | HEIGHT: 61 IN | HEART RATE: 82 BPM | TEMPERATURE: 99 F | RESPIRATION RATE: 18 BRPM | SYSTOLIC BLOOD PRESSURE: 138 MMHG | DIASTOLIC BLOOD PRESSURE: 62 MMHG | WEIGHT: 152 LBS | OXYGEN SATURATION: 100 %

## 2021-07-13 PROCEDURE — 76060000031 HC ANESTHESIA FIRST 0.5 HR: Performed by: INTERNAL MEDICINE

## 2021-07-13 PROCEDURE — 88312 SPECIAL STAINS GROUP 1: CPT

## 2021-07-13 PROCEDURE — 77030021593 HC FCPS BIOP ENDOSC BSC -A: Performed by: INTERNAL MEDICINE

## 2021-07-13 PROCEDURE — 43450 DILATE ESOPHAGUS 1/MULT PASS: CPT | Performed by: INTERNAL MEDICINE

## 2021-07-13 PROCEDURE — 88305 TISSUE EXAM BY PATHOLOGIST: CPT

## 2021-07-13 PROCEDURE — 74011000250 HC RX REV CODE- 250: Performed by: INTERNAL MEDICINE

## 2021-07-13 PROCEDURE — 2709999900 HC NON-CHARGEABLE SUPPLY: Performed by: INTERNAL MEDICINE

## 2021-07-13 PROCEDURE — 74011250636 HC RX REV CODE- 250/636: Performed by: NURSE ANESTHETIST, CERTIFIED REGISTERED

## 2021-07-13 PROCEDURE — 76040000019: Performed by: INTERNAL MEDICINE

## 2021-07-13 PROCEDURE — 43239 EGD BIOPSY SINGLE/MULTIPLE: CPT | Performed by: INTERNAL MEDICINE

## 2021-07-13 RX ORDER — PROPOFOL 10 MG/ML
INJECTION, EMULSION INTRAVENOUS AS NEEDED
Status: DISCONTINUED | OUTPATIENT
Start: 2021-07-13 | End: 2021-07-13 | Stop reason: HOSPADM

## 2021-07-13 RX ORDER — SODIUM CHLORIDE 9 MG/ML
125 INJECTION, SOLUTION INTRAVENOUS CONTINUOUS
Status: DISCONTINUED | OUTPATIENT
Start: 2021-07-13 | End: 2021-07-13 | Stop reason: HOSPADM

## 2021-07-13 RX ORDER — SODIUM CHLORIDE 9 MG/ML
INJECTION, SOLUTION INTRAVENOUS
Status: DISCONTINUED | OUTPATIENT
Start: 2021-07-13 | End: 2021-07-13 | Stop reason: HOSPADM

## 2021-07-13 RX ORDER — SODIUM CHLORIDE 450 MG/100ML
25 INJECTION, SOLUTION INTRAVENOUS CONTINUOUS
Status: DISCONTINUED | OUTPATIENT
Start: 2021-07-13 | End: 2021-07-13 | Stop reason: HOSPADM

## 2021-07-13 RX ORDER — SODIUM CHLORIDE 0.9 % (FLUSH) 0.9 %
5-40 SYRINGE (ML) INJECTION EVERY 8 HOURS
Status: DISCONTINUED | OUTPATIENT
Start: 2021-07-13 | End: 2021-07-13 | Stop reason: HOSPADM

## 2021-07-13 RX ORDER — SODIUM CHLORIDE 0.9 % (FLUSH) 0.9 %
5-40 SYRINGE (ML) INJECTION AS NEEDED
Status: DISCONTINUED | OUTPATIENT
Start: 2021-07-13 | End: 2021-07-13 | Stop reason: HOSPADM

## 2021-07-13 RX ADMIN — SODIUM CHLORIDE: 9 INJECTION, SOLUTION INTRAVENOUS at 09:58

## 2021-07-13 RX ADMIN — SODIUM CHLORIDE 25 ML/HR: 4.5 INJECTION, SOLUTION INTRAVENOUS at 09:35

## 2021-07-13 RX ADMIN — PROPOFOL 100 MG: 10 INJECTION, EMULSION INTRAVENOUS at 10:06

## 2021-07-13 NOTE — ANESTHESIA POSTPROCEDURE EVALUATION
Procedure(s):  ENDOSCOPY (EGD) AND ESOPHOGEAL DILATION.     total IV anesthesia    Anesthesia Post Evaluation      Multimodal analgesia: multimodal analgesia not used between 6 hours prior to anesthesia start to PACU discharge  Patient location during evaluation: PACU  Patient participation: complete - patient participated  Level of consciousness: sleepy but conscious  Pain score: 0  Pain management: adequate  Airway patency: patent  Anesthetic complications: no  Cardiovascular status: acceptable and stable  Respiratory status: acceptable and room air  Hydration status: acceptable  Post anesthesia nausea and vomiting:  none  Final Post Anesthesia Temperature Assessment:  Normothermia (36.0-37.5 degrees C)      INITIAL Post-op Vital signs:   Vitals Value Taken Time   /46 07/13/21 1016   Temp 37.2 °C (99 °F) 07/13/21 1016   Pulse 82 07/13/21 1016   Resp 16 07/13/21 1016   SpO2 100 % 07/13/21 1016

## 2021-07-13 NOTE — DISCHARGE INSTRUCTIONS
For the next 12 hours you should not:   1. drive   2. drink alcohol   3. operate any machinery   4. engage in activities that require mental sharpness or manual dexterity such as     cooking   5. take any drugs other than those prescribed by a physician   6. make any legal or financial decisions    Call your doctor's office immediately, if there is is anything unusual:   1. increased and continuing rectal bleeding   2. fever   3. Unusual abdominal pain    Take it easy today and resume normal activity tomorrow. It is common to have gas and mild bloating for a few hours. Pain is NOT normal. You may be groggy off and on for a few hours. Resume previous diet.         Florecita Mitchell MD  7/13/2021  10:25 AM

## 2021-07-13 NOTE — H&P
Patient: Tobi Sheridan MRN: [id number]  SSN: [id number]    YOB: 1955  Age: 61 y.o. Sex: female      History and Physical    Tobi Sheridan is a 61 y.o. female having Procedure(s):  ENDOSCOPY (EGD) AND ESOPHOGEAL DILATION. Allergies: Allergies   Allergen Reactions    Aspirin Rash and Unknown (comments)    Bactrim [Sulfamethoxazole-Trimethoprim] Rash and Unknown (comments)    Bromfenac Rash and Unknown (comments)    Cefepime Other (comments)     Neurological reaction    Ceftriaxone Rash    Copper Rash    Ibuprofen Rash and Unknown (comments)    Ketorolac Tromethamine Rash and Unknown (comments)    Morphine Rash and Unknown (comments)    Relafen [Nabumetone] Rash and Unknown (comments)    Rifampin Rash and Unknown (comments)    Vancomycin Rash and Unknown (comments)     Pt reports causes itching, takes benadryl prior to use. Pt denies anaphylaxis.     Requires benadryl with each vancomycin dose        Chief Complaint: dyspahagia  History of Present Illness:     Past Medical History:   Diagnosis Date    Anemia NEC     Arrhythmia     Asthma     Asthma     Burning with urination     frequent uti    Chronic kidney disease     dialysis    CMV (cytomegalovirus) antibody positive     [facility]     M/W/F    Dyspepsia and other specified disorders of function of stomach     stomach ulcer    Essential hypertension     GERD (gastroesophageal reflux disease)     Hypercholesteremia     Hypertension     Migraine     Obesity     Renal cyst 2002    kidney transplant     Ulcer       Past Surgical History:   Procedure Laterality Date    COLONOSCOPY      Dr Jane Mejia  5yrs ago    ENDOSCOPY VISIT-OUTPATIENT      Dr Jane Mejia  5 yrs ago    ENDOSCOPY, COLON, DIAGNOSTIC      with Dr. Aubrie Denton HX CHOLECYSTECTOMY  02/2021    HX GI      ulcer    HX HEENT      sinus surg    HX RENAL TRANSPLANT      HX TRANSPLANT      kidney transplant 2002    VASCULAR SURGERY PROCEDURE UNLIST shunt in prep for dialysis      Family History   Problem Relation Age of Onset    Colon Polyps Brother     Cancer Mother     Diabetes Father       Social History     Tobacco Use    Smoking status: Never Smoker    Smokeless tobacco: Never Used   Substance Use Topics    Alcohol use: No        Prior to Admission medications    Medication Sig Start Date End Date Taking? Authorizing Provider   Silvino Del Rosario 250-50 mcg/dose diskus inhaler INHALE 1 PUFF BY MOUTH EVERY 12 HOURS 7/3/21  Yes Elva Garnica MD   RenaPlex-D 800 mcg-12.5 mg -2,000 unit tab Take 1 Tablet by mouth daily. 4/15/21  Yes Marleni, MD Abelardo   sevelamer carbonate (RENVELA) 800 mg tab tab Take 800 mg by mouth three (3) times daily. 6/8/21  Yes Abelardo Yepez MD   zolpidem (AMBIEN) 5 mg tablet Take 5 mg by mouth nightly as needed. 4/5/21  Yes Abelardo Yepez MD   levothyroxine (SYNTHROID) 50 mcg tablet TAKE 1 TABLET BY MOUTH EVERY DAY 5/12/21  Yes Elva Garnica MD   raNITIdine hcl 300 mg cap Take 300 mg by mouth daily (after breakfast). 5/6/21  Yes Elva Garnica MD   meclizine (ANTIVERT) 25 mg tablet TAKE 1 TABLET BY MOUTH THREE TIMES DAILY FOR UP TO 10 DAYS AS NEEDED FOR DIZZINESS 4/27/21  Yes Elva Garnica MD   dicyclomine (BENTYL) 10 mg capsule TAKE 1 CAPSULE BY MOUTH FOUR TIMES DAILY 4/15/21  Yes Elva Garnica MD   sucralfate (CARAFATE) 1 gram tablet TAKE 1 TABLET BY MOUTH FOUR TIMES DAILY 3/28/21  Yes Elva Garnica MD   carvediloL (COREG) 6.25 mg tablet Take 1 Tab by mouth two (2) times daily (with meals). 3/2/21  Yes Elva Garnica MD   valGANciclovir (VALCYTE) 450 mg tablet Take 2 Tabs by mouth daily. 3/2/21  Yes Elva Garnica MD   simvastatin (ZOCOR) 20 mg tablet TAKE 1 TABLET BY MOUTH DAILY AS DIRECTED. 2/19/21  Yes Elva Garnica MD   sertraline (ZOLOFT) 50 mg tablet Take 1 Tab by mouth daily.  2/10/21  Yes Elva Garnica MD   amiodarone (CORDARONE) 200 mg tablet TAKE 1 TABLET BY MOUTH EVERY DAY 1/13/21  Yes Hai Srinath Cook MD   Dexilant 60 mg CpDB capsule (delayed release) TAKE ONE CAPSULE BY MOUTH EVERY DAY 12/22/20  Yes Phoenix Gallagher MD   amLODIPine (NORVASC) 10 mg tablet TAKE 1 TABLET BY MOUTH EVERY DAY  Patient taking differently: 5 mg daily. Indications: high blood pressure 9/22/20  Yes Phoenix Gallagher MD   metoprolol tartrate (LOPRESSOR) 50 mg tablet Take 50 mg by mouth daily. 2/22/19  Yes Other, MD Abelardo   aluminum & magnesium hydroxide-simethicone (Maalox Maximum Strength) 400-400-40 mg/5 mL suspension Take 10 mL by mouth every six (6) hours as needed for Indigestion. 9/17/20  Yes Alicia Crooks MD   ESTRACE 0.01 % (0.1 mg/gram) vaginal cream INSERT 2 GRAMS INTO VAGINA EVERY MONDAY AND THURSDAY 4/11/17  Yes Ana Lilia Magallon MD   cranberry extract 425 mg cap 425 mg. 1/17/14  Yes Provider, Historical   LORazepam (ATIVAN) 0.5 mg tablet 0.5 mg.   Yes Provider, Historical   fluticasone (FLONASE) 50 mcg/actuation nasal spray 1 Spray. Yes Provider, Historical   hyoscyamine SL (LEVSIN/SL) 0.125 mg SL tablet 0.125 mg by SubLINGual route every four (4) hours as needed. Yes Provider, Historical   montelukast (SINGULAIR) 10 mg tablet Take 10 mg by mouth daily. Yes Provider, Historical   ondansetron hcl (ZOFRAN) 4 mg tablet Take 1 Tablet by mouth every eight (8) hours as needed for Nausea, Vomiting or Nausea or Vomiting. Patient not taking: Reported on 7/13/2021 6/25/21   Mable Mccann MD   warfarin (COUMADIN) 2 mg tablet TAKE 3 TABLETS BY MOUTH EVERY DAY  Patient not taking: Reported on 7/13/2021 6/20/21   Phoenix Gallagher MD   ondansetron (Zofran ODT) 4 mg disintegrating tablet 1 Tablet by SubLINGual route every eight (8) hours as needed for Nausea or Vomiting. Patient not taking: Reported on 7/13/2021 5/25/21   Madalyn Ndiaye DO   famotidine (PEPCID) 20 mg tablet Take 1 Tab by mouth two (2) times a day.   Patient not taking: Reported on 7/13/2021 5/6/21   Phoenix Gallagher MD   albuterol (PROVENTIL VENTOLIN) 2.5 mg /3 mL (0.083 %) nebu USE 1 VIAL VIA NEBULIZER THREE TIMES DAILY  Patient not taking: Reported on 7/13/2021 2/8/21   Jacky Plascencia MD   omeprazole (PRILOSEC) 20 mg capsule TAKE ONE CAPSULE BY MOUTH TWICE DAILY  Patient not taking: Reported on 7/13/2021 9/22/20   Jacky Plascencia MD   EPINEPHrine (EPIPEN) 0.3 mg/0.3 mL injection 0.3 mg. 10/1/16   Provider, Historical        Visit Vitals  BP (!) 143/68   Pulse 80   Temp 37.7 °C (99.8 °F)   Resp 18   Ht 5' 1\" (1.549 m)   Wt 68.9 kg (152 lb)   SpO2 100%   Breastfeeding No   BMI 28.72 kg/m²        Assessment and Plan:   Racheal Burden is a 61 y.o. female having Procedure(s):  ENDOSCOPY (EGD) AND ESOPHOGEAL DILATION for     Preanesthesia Evaluation     Last edited 07/13/21 0920 by Jamil Johnson CRNA  Date of Service 07/13/21 0920  Status: Signed             Relevant Problems   No relevant active problems       Anesthetic History   No history of anesthetic complications  Other anesthesia complications          Review of Systems / Medical History  Patient summary reviewed, nursing notes reviewed and pertinent labs reviewed    Pulmonary  Within defined limits                 Neuro/Psych   Within defined limits           Cardiovascular  Within defined limits                     GI/Hepatic/Renal  Within defined limits              Endo/Other  Within defined limits           Other Findings              Physical Exam    Airway  Mallampati: II  TM Distance: 4 - 6 cm  Neck ROM: normal range of motion        Cardiovascular    Rhythm: regular  Rate: normal         Dental    Dentition: Edentulous     Pulmonary  Breath sounds clear to auscultation               Abdominal  Abdominal exam normal       Other Findings            Anesthetic Plan    ASA: 3  Anesthesia type: total IV anesthesia            Anesthetic plan and risks discussed with: Patient               Preanesthesia evaluation performed by Jamil Johnson CRNA            Signed By: Mariaelena Arana CRNA     July 13, 2021

## 2021-07-13 NOTE — OP NOTES
EGD Procedure Note        Patient: Garett Kaminski MRN: [id number]  SSN: [id number]    YOB: 1955  Age: 61 y.o. Sex: female        Date/Time:  7/13/2021 10:18 AM         IMPRESSION:       1. Lower esophageal stricture  2. Distal esophagitis (grade 2)  3. Antral gastritis       RECOMMENDATIONS:    1. Check biopsy results. 2. Repeat esophageal dilatation as needed  3. Continue the omeprazole 20 mg daily    Procedure: Esophagogastroduodenoscopy with cold biopsies                       Esophageal dilatation    Indication: Esophageal dysphagia    Endoscopist:  Amy Montano MD    Referring Provider:   Glenna Bermudez MD    History: The history and physical exam were reviewed and updated. Endoscope: GIF H190 office video endoscope    Extent of Exam: Second part of the duodenum    ASA: Grade 2    Anethesia/Sedation:  TIIVA    Description of the procedure: The procedure was discussed with the patient including risks, benefits, alternatives including risks of iv sedation, bleeding, perforation and aspiration. A safety timeout was performed. The patient was placed in the left lateral decubitus position. A bite block was placed. The patient was using standard protocol. The patients vital signs were monitored at all times including heart rate/rhythm, blood pressure and oxygen saturation. The endoscope was then passed under direct visualization to the second part of the duodenum. The endoscope was then slowly withdrawn while visualizing the mucosa. In the stomach a retroflexion was performed and gastric fundus and cardia visualized. The patient was then transferred to recovery in stable condition. Findings:   Esophagus: The esophageal mucosa was inflamed in the GE junction region consistent with a grade 2 esophagitis. We also saw stricturing and scarring at the distal esophagus to similar lower esophageal stricture. Stomach:  The gastric mucosa was inflamed throughout the gastric antrum. Multiple biopsies were taken there. .   Duodenum: The duodenum mucosa was normal with no ulceration, mass, stricture and no evidence of villous atrophy. Therapies: Esophageal dilatation using the 50 [employer] [address] bougie. After the dilator was well lubricated it was inserted into the oropharynx and taken down the esophagus time 4 passes and then removed. Patient tolerated the procedure well. Specimens:   ID Type Source Tests Collected by Time Destination   1 : gastric antrum mucosa Preservative Stomach, Antrum  Serge MD Luis 7/13/2021 1009 Pathology              EBL: Minimal    Complications:   None; patient tolerated the procedure well.      Implants: None    Discharge disposition:  Out of the recovery area when discharge criteria met         Tiara Riojas MD  July 13, 2021  10:18 AM

## 2021-07-13 NOTE — INTERVAL H&P NOTE
Update History & Physical    The Patient's History and Physical of July 13, 2021 was reviewed with the patient and I examined the patient. There was no change. The surgical site was confirmed by the patient and me. Plan:  The risk, benefits, expected outcome, and alternative to the recommended procedure have been discussed with the patient. Patient understands and wants to proceed with the procedure.     Electronically signed by Michelle Hernandez MD on 7/13/2021 at 8:53 AM

## 2021-07-14 ENCOUNTER — OFFICE VISIT (OUTPATIENT)
Dept: FAMILY MEDICINE CLINIC | Age: 66
End: 2021-07-14
Payer: MEDICARE

## 2021-07-14 VITALS
BODY MASS INDEX: 28.72 KG/M2 | OXYGEN SATURATION: 100 % | DIASTOLIC BLOOD PRESSURE: 80 MMHG | SYSTOLIC BLOOD PRESSURE: 138 MMHG | WEIGHT: 152 LBS | HEART RATE: 83 BPM

## 2021-07-14 DIAGNOSIS — K29.90 GASTRITIS AND DUODENITIS: Primary | ICD-10-CM

## 2021-07-14 DIAGNOSIS — N39.0 RECURRENT URINARY TRACT INFECTION: ICD-10-CM

## 2021-07-14 DIAGNOSIS — M79.605 LEFT LEG PAIN: ICD-10-CM

## 2021-07-14 DIAGNOSIS — Z99.2 ESRD (END STAGE RENAL DISEASE) ON DIALYSIS (HCC): ICD-10-CM

## 2021-07-14 DIAGNOSIS — N18.6 ESRD (END STAGE RENAL DISEASE) ON DIALYSIS (HCC): ICD-10-CM

## 2021-07-14 PROCEDURE — G9899 SCRN MAM PERF RSLTS DOC: HCPCS | Performed by: FAMILY MEDICINE

## 2021-07-14 PROCEDURE — G8419 CALC BMI OUT NRM PARAM NOF/U: HCPCS | Performed by: FAMILY MEDICINE

## 2021-07-14 PROCEDURE — G8427 DOCREV CUR MEDS BY ELIG CLIN: HCPCS | Performed by: FAMILY MEDICINE

## 2021-07-14 PROCEDURE — G8510 SCR DEP NEG, NO PLAN REQD: HCPCS | Performed by: FAMILY MEDICINE

## 2021-07-14 PROCEDURE — G9231 DOC ESRD DIA TRANS PREG: HCPCS | Performed by: FAMILY MEDICINE

## 2021-07-14 PROCEDURE — 99213 OFFICE O/P EST LOW 20 MIN: CPT | Performed by: FAMILY MEDICINE

## 2021-07-14 PROCEDURE — 3017F COLORECTAL CA SCREEN DOC REV: CPT | Performed by: FAMILY MEDICINE

## 2021-07-14 NOTE — PROGRESS NOTES
Subjective:   Cherry Chacon is a 61 y.o. female who was seen for Hospital Follow Up (was in the hospital about 3 weeks ago ) and Leg Pain (has alot of pain in her left leg from the back of her knee up her thigh about half way )    HPI patient is a 72-year-old -American female who is status post 2 renal transplants. No nausea vomiting or diarrhea. No cough or cold. No chest pain or shortness of breath she has had posterior left leg pain behind the knee. She has been seen by 2 orthopedist in the past for backs and had back injections with no relief she thinks that is what this feels like. She has had no fever or chills she was admitted recently to [facility] and had an endoscopy and told she had gastritis she had a history of ulcer disease in the past she has had no falls or injury no rash no syncope no loss of consciousness. Bowel movements have been appropriate. Nobody at home has been sick not eating as well sometimes only 1 meal a day and we talked about that aggressively today. That is completely inappropriate her blood sugar was 60 when she went to the emergency room at [facility] Medications    Medication Sig Start Date End Date Taking? Authorizing Provider   Ross Pacheco 250-50 mcg/dose diskus inhaler INHALE 1 PUFF BY MOUTH EVERY 12 HOURS 7/3/21  Yes Kendra Duarte MD   RenaPlex-D 800 mcg-12.5 mg -2,000 unit tab Take 1 Tablet by mouth daily. 4/15/21  Yes Marleni, MD Abelardo   sevelamer carbonate (RENVELA) 800 mg tab tab Take 800 mg by mouth three (3) times daily. 6/8/21  Yes Marleni, MD Abelardo   zolpidem (AMBIEN) 5 mg tablet Take 5 mg by mouth nightly as needed. 4/5/21  Yes Marleni, MD Abelardo   levothyroxine (SYNTHROID) 50 mcg tablet TAKE 1 TABLET BY MOUTH EVERY DAY 5/12/21  Yes Kendra Duarte MD   raNITIdine hcl 300 mg cap Take 300 mg by mouth daily (after breakfast).  5/6/21  Yes Kendra Duarte MD   meclizine (ANTIVERT) 25 mg tablet TAKE 1 TABLET BY MOUTH THREE TIMES DAILY FOR UP TO 10 DAYS AS NEEDED FOR DIZZINESS 4/27/21  Yes Davion Bell MD   dicyclomine (BENTYL) 10 mg capsule TAKE 1 CAPSULE BY MOUTH FOUR TIMES DAILY 4/15/21  Yes Davion Bell MD   sucralfate (CARAFATE) 1 gram tablet TAKE 1 TABLET BY MOUTH FOUR TIMES DAILY 3/28/21  Yes Davion Bell MD   carvediloL (COREG) 6.25 mg tablet Take 1 Tab by mouth two (2) times daily (with meals). 3/2/21  Yes Davion Bell MD   valGANciclovir (VALCYTE) 450 mg tablet Take 2 Tabs by mouth daily. 3/2/21  Yes Davion Bell MD   simvastatin (ZOCOR) 20 mg tablet TAKE 1 TABLET BY MOUTH DAILY AS DIRECTED. 2/19/21  Yes Davion Bell MD   sertraline (ZOLOFT) 50 mg tablet Take 1 Tab by mouth daily. 2/10/21  Yes Davion Bell MD   albuterol (PROVENTIL VENTOLIN) 2.5 mg /3 mL (0.083 %) nebu USE 1 VIAL VIA NEBULIZER THREE TIMES DAILY 2/8/21  Yes Davion Bell MD   amiodarone (CORDARONE) 200 mg tablet TAKE 1 TABLET BY MOUTH EVERY DAY 1/13/21  Yes Davion Bell MD   Dexilant 60 mg CpDB capsule (delayed release) TAKE ONE CAPSULE BY MOUTH EVERY DAY 12/22/20  Yes Davion Bell MD   amLODIPine (NORVASC) 10 mg tablet TAKE 1 TABLET BY MOUTH EVERY DAY  Patient taking differently: 5 mg daily. Indications: high blood pressure 9/22/20  Yes Davion Bell MD   metoprolol tartrate (LOPRESSOR) 50 mg tablet Take 50 mg by mouth daily. 2/22/19  Yes Marleni, MD Abelardo   aluminum & magnesium hydroxide-simethicone (Maalox Maximum Strength) 400-400-40 mg/5 mL suspension Take 10 mL by mouth every six (6) hours as needed for Indigestion.  9/17/20  Yes Caro James MD   ESTRACE 0.01 % (0.1 mg/gram) vaginal cream INSERT 2 GRAMS INTO VAGINA EVERY MONDAY AND THURSDAY 4/11/17  Yes Dasia Montelongo MD   cranberry extract 425 mg cap 425 mg. 1/17/14  Yes Provider, Historical   LORazepam (ATIVAN) 0.5 mg tablet 0.5 mg.   Yes Provider, Historical   EPINEPHrine (EPIPEN) 0.3 mg/0.3 mL injection 0.3 mg. 10/1/16  Yes Provider, Historical   hyoscyamine SL (LEVSIN/SL) 0.125 mg SL tablet 0.125 mg by SubLINGual route every four (4) hours as needed. Yes Provider, Historical   montelukast (SINGULAIR) 10 mg tablet Take 10 mg by mouth daily. Yes Provider, Historical   ondansetron hcl (ZOFRAN) 4 mg tablet Take 1 Tablet by mouth every eight (8) hours as needed for Nausea, Vomiting or Nausea or Vomiting. Patient not taking: Reported on 7/13/2021 6/25/21   Jade Manrique MD   warfarin (COUMADIN) 2 mg tablet TAKE 3 TABLETS BY MOUTH EVERY DAY  Patient not taking: Reported on 7/13/2021 6/20/21   Bobby Parekh MD   ondansetron (Zofran ODT) 4 mg disintegrating tablet 1 Tablet by SubLINGual route every eight (8) hours as needed for Nausea or Vomiting. Patient not taking: Reported on 7/13/2021 5/25/21   Jorje Buitrago DO   famotidine (PEPCID) 20 mg tablet Take 1 Tab by mouth two (2) times a day. Patient not taking: Reported on 7/14/2021 5/6/21   Bobby Parekh MD   omeprazole (PRILOSEC) 20 mg capsule TAKE ONE CAPSULE BY MOUTH TWICE DAILY  Patient not taking: Reported on 7/13/2021 9/22/20   Bobby Parekh MD   fluticasone [facility]) 50 mcg/actuation nasal spray 1 Spray. Provider, Historical      Allergies   Allergen Reactions    Aspirin Rash and Unknown (comments)    Bactrim [Sulfamethoxazole-Trimethoprim] Rash and Unknown (comments)    Bromfenac Rash and Unknown (comments)    Cefepime Other (comments)     Neurological reaction    Ceftriaxone Rash    Copper Rash    Ibuprofen Rash and Unknown (comments)    Ketorolac Tromethamine Rash and Unknown (comments)    Morphine Rash and Unknown (comments)    Relafen [Nabumetone] Rash and Unknown (comments)    Rifampin Rash and Unknown (comments)    Vancomycin Rash and Unknown (comments)     Pt reports causes itching, takes benadryl prior to use. Pt denies anaphylaxis.     Requires benadryl with each vancomycin dose     Social History     Tobacco Use    Smoking status: Never Smoker    Smokeless tobacco: Never Used   Vaping Use    Vaping Use: Never used   Substance Use Topics    Alcohol use: No    Drug use: No            Review of Systems   Constitutional: Positive for fatigue. HENT: Negative. Eyes: Negative. Respiratory: Negative. Cardiovascular: Negative. Endocrine: Negative. Genitourinary: Negative. Musculoskeletal: Positive for arthralgias. Allergic/Immunologic: Negative. Neurological: Negative. Hematological: Negative. Psychiatric/Behavioral: Positive for dysphoric mood. Physical Exam   Objective:     Visit Vitals  /80   Pulse 83   Wt 152 lb (68.9 kg)   SpO2 100%   BMI 28.72 kg/m²      General: alert, cooperative, no distress   Mental  status: normal mood, behavior, speech, dress, motor activity, and thought processes, able to follow commands   HENT: NCAT   Neck: no visualized mass   Resp: no respiratory distress   Neuro: no gross deficits   Skin: no discoloration or lesions of concern on visible areas   Psychiatric: normal affect, consistent with stated mood, no evidence of hallucinations   Her affect is flat today. The lungs are clear she has a regular rate and rhythm no murmurs gallops or rubs. The abdomen is benign. She is pleasant and cooperative in no significant distress a little tenderness in the back of the left knee. No swelling is appreciable. Assessment & Plan:     Neuritis, knee pain, chronic renal disease or status post transplant x2: I had a long talk with the patient today she needs to be eating 6 small meals a day I talked to her about specific types of foods. She is on medication for gastritis from the GI doctor. She needs to continue that she is already on an antidepressant. I do not know if it needs to be increased but she does not feel depressed. Were going to continue her present medicine I am going to make sure she is using the [address] [place] [address] [employer] which is a new medicine I have asked her to eat 6 meals a day.   We will follow up from there if other issues arise she is going to get referred back to a back doctor. Working to take care of that and I think the orthopedist really will be able to look at her leg. 712    Additional exam findings: We discussed the expected course, resolution and complications of the diagnosis(es) in detail. Medication risks, benefits, costs, interactions, and alternatives were discussed as indicated. I advised her to contact the office if her condition worsens, changes or fails to improve as anticipated. She expressed understanding with the diagnosis(es) and plan.

## 2021-07-14 NOTE — PROGRESS NOTES
Tobi Sheridan presents today for   Chief Complaint   Patient presents with   [facility] Follow Up     was in the hospital about 3 weeks ago     Leg Pain     has alot of pain in her left leg from the back of her knee up her thigh about half way        Is someone accompanying this pt? yes    Is the patient using any DME equipment during OV? no    Depression Screening:  3 most recent PHQ Screens 7/14/2021   Little interest or pleasure in doing things Not at all   Feeling down, depressed, irritable, or hopeless Not at all   Total Score PHQ 2 0       Learning Assessment:  Learning Assessment 10/9/2020   PRIMARY LEARNER Patient   HIGHEST LEVEL OF EDUCATION - PRIMARY LEARNER  DID NOT GRADUATE HIGH SCHOOL   BARRIERS PRIMARY LEARNER NONE   PRIMARY LANGUAGE ENGLISH   LEARNER PREFERENCE PRIMARY READING   ANSWERED BY patient   RELATIONSHIP SELF       Fall Risk  Fall Risk Assessment, last 12 mths 12/24/2020   Able to walk? Yes   Fall in past 12 months? 0   Do you feel unsteady?  0   Are you worried about falling 0   Number of falls in past 12 months -   Fall with injury? -       ADL  ADL Assessment 12/24/2020   Feeding yourself No Help Needed   Getting from bed to chair No Help Needed   Getting dressed No Help Needed   Bathing or showering No Help Needed   Walk across the room (includes cane/walker) No Help Needed   Using the telphone No Help Needed   Taking your medications No Help Needed   Preparing meals No Help Needed   Managing money (expenses/bills) No Help Needed   Moderately strenuous housework (laundry) No Help Needed   Shopping for personal items (toiletries/medicines) No Help Needed   Shopping for groceries No Help Needed   Driving No Help Needed   Climbing a flight of stairs No Help Needed   Getting to places beyond walking distances No Help Needed       Travel Screening:    Travel Screening     Question   Response    In the last month, have you been in contact with someone who was confirmed or suspected to have Jelly Chen / [id number]? No / Unsure    Have you had a COVID-19 viral test in the last 14 days? No    Do you have any of the following new or worsening symptoms? None of these    Have you traveled internationally or domestically in the last month? No      Travel History   Travel since 06/14/21     No documented travel since 06/14/21          Health Maintenance reviewed and discussed and ordered per Provider. Health Maintenance Due   Topic Date Due    Hepatitis C Screening  Never done    COVID-19 Vaccine (1) Never done    DTaP/Tdap/Td series (1 - Tdap) Never done    PAP AKA CERVICAL CYTOLOGY  Never done    Shingrix Vaccine Age 50> (1 of 2) Never done    Pneumococcal 0-64 years (3 of 4 - PCV13) 11/13/2017    Medicare Yearly Exam  Never done   . Coordination of Care:  1. Have you been to the ER, urgent care clinic since your last visit? Hospitalized since your last visit? yes    2. Have you seen or consulted any other health care providers outside of the [address] [place] [address] since your last visit? Include any pap smears or colon screening.  yes

## 2021-07-20 DIAGNOSIS — B37.81 THRUSH OF MOUTH AND ESOPHAGUS (HCC): ICD-10-CM

## 2021-07-20 DIAGNOSIS — B37.0 THRUSH OF MOUTH AND ESOPHAGUS (HCC): ICD-10-CM

## 2021-07-20 RX ORDER — FLUCONAZOLE 150 MG/1
TABLET ORAL
Qty: 4 TABLET | Refills: 0 | Status: SHIPPED | OUTPATIENT
Start: 2021-07-20 | End: 2021-07-26

## 2021-07-23 ENCOUNTER — OFFICE VISIT (OUTPATIENT)
Dept: FAMILY MEDICINE CLINIC | Age: 66
End: 2021-07-23

## 2021-07-23 ENCOUNTER — HOSPITAL ENCOUNTER (OUTPATIENT)
Dept: LAB | Age: 66
Discharge: HOME | End: 2021-07-23
Payer: MEDICARE

## 2021-07-23 VITALS — DIASTOLIC BLOOD PRESSURE: 88 MMHG | SYSTOLIC BLOOD PRESSURE: 138 MMHG | OXYGEN SATURATION: 100 % | HEART RATE: 80 BPM

## 2021-07-23 DIAGNOSIS — R35.0 URINE FREQUENCY: Primary | ICD-10-CM

## 2021-07-23 LAB
BILIRUB UR QL STRIP: NEGATIVE
GLUCOSE UR-MCNC: NEGATIVE MG/DL
KETONES P FAST UR STRIP-MCNC: NEGATIVE MG/DL
PH UR STRIP: 8.5 [PH] (ref 4.6–8)
PROT UR QL STRIP: ABNORMAL
SP GR UR STRIP: 1.02 (ref 1–1.03)
UA UROBILINOGEN AMB POC: ABNORMAL (ref 0.2–1)
URINALYSIS CLARITY POC: CLEAR
URINALYSIS COLOR POC: YELLOW
URINE BLOOD POC: ABNORMAL
URINE LEUKOCYTES POC: ABNORMAL
URINE NITRITES POC: NEGATIVE

## 2021-07-23 PROCEDURE — G8419 CALC BMI OUT NRM PARAM NOF/U: HCPCS | Performed by: FAMILY MEDICINE

## 2021-07-23 PROCEDURE — G8510 SCR DEP NEG, NO PLAN REQD: HCPCS | Performed by: FAMILY MEDICINE

## 2021-07-23 PROCEDURE — 81002 URINALYSIS NONAUTO W/O SCOPE: CPT | Performed by: FAMILY MEDICINE

## 2021-07-23 PROCEDURE — 3017F COLORECTAL CA SCREEN DOC REV: CPT | Performed by: FAMILY MEDICINE

## 2021-07-23 PROCEDURE — G9899 SCRN MAM PERF RSLTS DOC: HCPCS | Performed by: FAMILY MEDICINE

## 2021-07-23 PROCEDURE — G9231 DOC ESRD DIA TRANS PREG: HCPCS | Performed by: FAMILY MEDICINE

## 2021-07-23 PROCEDURE — 87077 CULTURE AEROBIC IDENTIFY: CPT

## 2021-07-23 PROCEDURE — 99213 OFFICE O/P EST LOW 20 MIN: CPT | Performed by: FAMILY MEDICINE

## 2021-07-23 PROCEDURE — 87086 URINE CULTURE/COLONY COUNT: CPT

## 2021-07-23 PROCEDURE — 87186 SC STD MICRODIL/AGAR DIL: CPT

## 2021-07-23 PROCEDURE — 36415 COLL VENOUS BLD VENIPUNCTURE: CPT

## 2021-07-23 PROCEDURE — G8427 DOCREV CUR MEDS BY ELIG CLIN: HCPCS | Performed by: FAMILY MEDICINE

## 2021-07-23 RX ORDER — CIPROFLOXACIN 500 MG/1
500 TABLET ORAL 2 TIMES DAILY
Qty: 20 TABLET | Refills: 0 | Status: CANCELLED | OUTPATIENT
Start: 2021-07-23 | End: 2021-08-02

## 2021-07-23 NOTE — PROGRESS NOTES
Subjective:   Milan Escamilla is a 61 y.o. female who was seen for UTI (possible UTI)    HPI patient is an unfortunate 62-year-old -American female with recurrent urinary tract infections. She has had no nausea vomiting or diarrhea. No cough or cold. She has been eating relatively well. Nobody at home has been sick she lives with family members. She has had several renal transplants. Her son recently  unexpectedly. No falls or injuries no rash no syncope no loss of consciousness. Bowel movements have been appropriate. Nobody at home has been sick no chest pain or shortness of breath. Eating relatively well she has had recurrent foul urine and discomfort with voiding. No rashes no syncope or loss of consciousness. Her bowel movements have been appropriate. She has been eating relatively well. Nobody at home has been sick    Home Medications    Medication Sig Start Date End Date Taking? Authorizing Provider   fluconazole (DIFLUCAN) 150 mg tablet TAKE 1 TABLET BY MOUTH DAILY FOR 4 DAYS 21  Yes Jaswinder Cooley MD   Chandana Prior 250-50 mcg/dose diskus inhaler INHALE 1 PUFF BY MOUTH EVERY 12 HOURS 7/3/21  Yes Jaswinder Cooley MD   ondansetron hcl [facility]) 4 mg tablet Take 1 Tablet by mouth every eight (8) hours as needed for Nausea, Vomiting or Nausea or Vomiting. 21  Yes Luis Velázquez MD   RenaPlex-D 800 mcg-12.5 mg -2,000 unit tab Take 1 Tablet by mouth daily. 4/15/21  Yes Abelardo Yepez MD   sevelamer carbonate (RENVELA) 800 mg tab tab Take 800 mg by mouth three (3) times daily. 21  Yes Abelardo Yepez MD   zolpidem (AMBIEN) 5 mg tablet Take 5 mg by mouth nightly as needed.  21  Yes Abelardo Yepez MD   ondansetron (Zofran ODT) 4 mg disintegrating tablet 1 Tablet by SubLINGual route every eight (8) hours as needed for Nausea or Vomiting. 21  Yes Renata Santos,    levothyroxine (SYNTHROID) 50 mcg tablet TAKE 1 TABLET BY MOUTH EVERY DAY 21  Yes Jaswinder Cooley MD meclizine (ANTIVERT) 25 mg tablet TAKE 1 TABLET BY MOUTH THREE TIMES DAILY FOR UP TO 10 DAYS AS NEEDED FOR DIZZINESS 4/27/21  Yes Yessenia Quevedo MD   dicyclomine (BENTYL) 10 mg capsule TAKE 1 CAPSULE BY MOUTH FOUR TIMES DAILY 4/15/21  Yes Yessenia Quevedo MD   sucralfate (CARAFATE) 1 gram tablet TAKE 1 TABLET BY MOUTH FOUR TIMES DAILY 3/28/21  Yes Yessenia Quevedo MD   carvediloL (COREG) 6.25 mg tablet Take 1 Tab by mouth two (2) times daily (with meals). 3/2/21  Yes Yessenia Quevedo MD   valGANciclovir (VALCYTE) 450 mg tablet Take 2 Tabs by mouth daily. 3/2/21  Yes Yessenia Quevedo MD   simvastatin (ZOCOR) 20 mg tablet TAKE 1 TABLET BY MOUTH DAILY AS DIRECTED. 2/19/21  Yes Yessenia Quevedo MD   sertraline (ZOLOFT) 50 mg tablet Take 1 Tab by mouth daily. 2/10/21  Yes Yessenia Quevedo MD   albuterol (PROVENTIL VENTOLIN) 2.5 mg /3 mL (0.083 %) nebu USE 1 VIAL VIA NEBULIZER THREE TIMES DAILY 2/8/21  Yes Yessenia Quevedo MD   amiodarone (CORDARONE) 200 mg tablet TAKE 1 TABLET BY MOUTH EVERY DAY 1/13/21  Yes Yessenia Quevedo MD   Dexilant 60 mg CpDB capsule (delayed release) TAKE ONE CAPSULE BY MOUTH EVERY DAY 12/22/20  Yes Yessenia Quevedo MD   metoprolol tartrate (LOPRESSOR) 50 mg tablet Take 50 mg by mouth daily. 2/22/19  Yes Other, MD Abelardo   aluminum & magnesium hydroxide-simethicone (Maalox Maximum Strength) 400-400-40 mg/5 mL suspension Take 10 mL by mouth every six (6) hours as needed for Indigestion. 9/17/20  Yes Randall Bhatia MD   ESTRACE 0.01 % (0.1 mg/gram) vaginal cream INSERT 2 GRAMS INTO VAGINA EVERY MONDAY AND THURSDAY 4/11/17  Yes Salvador Norton MD   cranberry extract 425 mg cap 425 mg. 1/17/14  Yes Provider, Historical   LORazepam (ATIVAN) 0.5 mg tablet 0.5 mg.   Yes Provider, Historical   fluticasone (FLONASE) 50 mcg/actuation nasal spray 1 [place].    Yes Provider, Historical   EPINEPHrine (EPIPEN) 0.3 mg/0.3 mL injection 0.3 mg. 10/1/16  Yes Provider, Historical   hyoscyamine SL (LEVSIN/SL) 0.125 mg SL tablet 0.125 mg by SubLINGual route every four (4) hours as needed. Yes Provider, Historical   montelukast (SINGULAIR) 10 mg tablet Take 10 mg by mouth daily. Yes Provider, Historical      Allergies   Allergen Reactions    Aspirin Rash and Unknown (comments)    Bactrim [Sulfamethoxazole-Trimethoprim] Rash and Unknown (comments)    Bromfenac Rash and Unknown (comments)    Cefepime Other (comments)     Neurological reaction    Ceftriaxone Rash    Copper Rash    Ibuprofen Rash and Unknown (comments)    Ketorolac Tromethamine Rash and Unknown (comments)    Morphine Rash and Unknown (comments)    Relafen [Nabumetone] Rash and Unknown (comments)    Rifampin Rash and Unknown (comments)    Vancomycin Rash and Unknown (comments)     Pt reports causes itching, takes benadryl prior to use. Pt denies anaphylaxis. Requires benadryl with each vancomycin dose     Social History     Tobacco Use    Smoking status: Never Smoker    Smokeless tobacco: Never Used   Vaping Use    Vaping Use: Never used   Substance Use Topics    Alcohol use: No    Drug use: No            Review of Systems   Constitutional: Negative. HENT: Negative. Eyes: Negative. Respiratory: Negative. Cardiovascular: Negative. Gastrointestinal: Negative. Genitourinary: Positive for dysuria. Musculoskeletal: Positive for arthralgias. Allergic/Immunologic: Negative. Neurological: Negative. Hematological: Negative.          Physical Exam   Objective:     Visit Vitals  /88   Pulse 80   SpO2 100%      General: alert, cooperative, no distress   Mental  status: normal mood, behavior, speech, dress, motor activity, and thought processes, able to follow commands   HENT: NCAT   Neck: no visualized mass   Resp: no respiratory distress   Neuro: no gross deficits   Skin: no discoloration or lesions of concern on visible areas   Psychiatric: normal affect, consistent with stated mood, no evidence of hallucinations   Well. Her vital signs are stable the lungs are clear the abdomen is soft. She is pleasant and cooperative in no significant distress. She is oriented x3 she has some osteoarthritis. There is no edema she has chronic paleness. Assessment & Plan:     Post renal transplant x2, recurrent UTI, osteoarthritis, a culture and urine of her urine will be performed I am going to cover her with Cipro which is what she normally takes I will call her with results of the culture is changed I have asked her to increase her liquids. She can use vitamin C if she would like with 500 mg daily. She will call me if any other issues arise. 712    Additional exam findings: We discussed the expected course, resolution and complications of the diagnosis(es) in detail. Medication risks, benefits, costs, interactions, and alternatives were discussed as indicated. I advised her to contact the office if her condition worsens, changes or fails to improve as anticipated. She expressed understanding with the diagnosis(es) and plan.

## 2021-07-23 NOTE — PROGRESS NOTES
Rob Smyth presents today for   Chief Complaint   Patient presents with    UTI     possible UTI       Is someone accompanying this pt? yes    Is the patient using any DME equipment during [address]? no    Depression Screening:  3 most recent PHQ Screens 7/23/2021   Little interest or pleasure in doing things Not at all   Feeling down, depressed, irritable, or hopeless Not at all   Total Score PHQ 2 0       Learning Assessment:  Learning Assessment 10/9/2020   PRIMARY LEARNER Patient   HIGHEST LEVEL OF EDUCATION - PRIMARY LEARNER  DID NOT GRADUATE HIGH SCHOOL   BARRIERS PRIMARY LEARNER NONE   PRIMARY LANGUAGE ENGLISH   LEARNER PREFERENCE PRIMARY READING   ANSWERED BY patient   RELATIONSHIP SELF       Fall Risk  Fall Risk Assessment, last 12 mths 12/24/2020   Able to walk? Yes   Fall in past 12 months? 0   Do you feel unsteady? 0   Are you worried about falling 0   Number of falls in past 12 months -   Fall with injury? -       ADL  ADL Assessment 12/24/2020   Feeding yourself No Help Needed   Getting from bed to chair No Help Needed   Getting dressed No Help Needed   Bathing or showering No Help Needed   Walk across the room (includes cane/walker) No Help Needed   Using the telphone No Help Needed   Taking your medications No Help Needed   Preparing meals No Help Needed   Managing money (expenses/bills) No Help Needed   Moderately strenuous housework (laundry) No Help Needed   Shopping for personal items (toiletries/medicines) No Help Needed   Shopping for groceries No Help Needed   Driving No Help Needed   Climbing a flight of stairs No Help Needed   Getting to places beyond walking distances No Help Needed       Travel Screening:    Travel Screening     Question   Response    In the last month, have you been in contact with someone who was confirmed or suspected to have Coronavirus / COVID-19? No / Unsure    Have you had a COVID-19 viral test in the last 14 days?   No    Do you have any of the following new or worsening symptoms? None of these    Have you traveled internationally or domestically in the last month? No      Travel History   Travel since 06/23/21     No documented travel since 06/23/21          Health Maintenance reviewed and discussed and ordered per Provider. Health Maintenance Due   Topic Date Due    Hepatitis C Screening  Never done    COVID-19 Vaccine (1) Never done    DTaP/Tdap/Td series (1 - Tdap) Never done    PAP AKA CERVICAL CYTOLOGY  Never done    Shingrix Vaccine Age 50> (1 of 2) Never done    Pneumococcal 0-64 years (3 of 4 - PCV13) 11/13/2017    Medicare Yearly Exam  Never done   . Coordination of Care:  1. Have you been to the ER, urgent care clinic since your last visit? Hospitalized since your last visit? no    2. Have you seen or consulted any other health care providers outside of the [address] [place] [address] since your last visit? Include any pap smears or colon screening.  no

## 2021-07-24 DIAGNOSIS — B37.0 THRUSH OF MOUTH AND ESOPHAGUS (HCC): ICD-10-CM

## 2021-07-24 DIAGNOSIS — B37.81 THRUSH OF MOUTH AND ESOPHAGUS (HCC): ICD-10-CM

## 2021-07-26 ENCOUNTER — TELEPHONE (OUTPATIENT)
Dept: FAMILY MEDICINE CLINIC | Age: 66
End: 2021-07-26

## 2021-07-26 DIAGNOSIS — N30.00 ACUTE CYSTITIS WITHOUT HEMATURIA: Primary | ICD-10-CM

## 2021-07-26 RX ORDER — CEPHALEXIN 500 MG/1
500 CAPSULE ORAL 4 TIMES DAILY
Qty: 40 CAPSULE | Refills: 0 | Status: SHIPPED | OUTPATIENT
Start: 2021-07-26 | End: 2021-08-05

## 2021-07-26 RX ORDER — FLUCONAZOLE 150 MG/1
TABLET ORAL
Qty: 4 TABLET | Refills: 0 | Status: ON HOLD | OUTPATIENT
Start: 2021-07-26 | End: 2021-08-10

## 2021-07-27 LAB
BACTERIA SPEC CULT: ABNORMAL
BACTERIA SPEC CULT: ABNORMAL
COLONY COUNT,CNT: ABNORMAL
SPECIAL REQUESTS,SREQ: ABNORMAL

## 2021-07-28 ENCOUNTER — TELEPHONE (OUTPATIENT)
Dept: FAMILY MEDICINE CLINIC | Age: 66
End: 2021-07-28

## 2021-07-30 ENCOUNTER — HOSPITAL ENCOUNTER (EMERGENCY)
Age: 66
Discharge: HOME | End: 2021-07-30
Attending: EMERGENCY MEDICINE
Payer: MEDICARE

## 2021-07-30 ENCOUNTER — TELEPHONE (OUTPATIENT)
Dept: GASTROENTEROLOGY | Age: 66
End: 2021-07-30

## 2021-07-30 ENCOUNTER — APPOINTMENT (OUTPATIENT)
Dept: CT IMAGING | Age: 66
End: 2021-07-30
Attending: EMERGENCY MEDICINE
Payer: MEDICARE

## 2021-07-30 VITALS
HEIGHT: 61 IN | SYSTOLIC BLOOD PRESSURE: 132 MMHG | DIASTOLIC BLOOD PRESSURE: 62 MMHG | WEIGHT: 149 LBS | HEART RATE: 85 BPM | OXYGEN SATURATION: 100 % | BODY MASS INDEX: 28.13 KG/M2 | RESPIRATION RATE: 18 BRPM | TEMPERATURE: 99 F

## 2021-07-30 DIAGNOSIS — R10.9 ABDOMINAL PAIN, UNSPECIFIED ABDOMINAL LOCATION: Primary | ICD-10-CM

## 2021-07-30 DIAGNOSIS — D64.9 ANEMIA, UNSPECIFIED TYPE: ICD-10-CM

## 2021-07-30 LAB
ANION GAP SERPL CALC-SCNC: 12 MMOL/L
BASOPHILS # BLD: 0.1 K/UL (ref 0–0.1)
BASOPHILS NFR BLD: 1 % (ref 0–2)
BUN SERPL-MCNC: 28 MG/DL (ref 9–21)
BUN/CREAT SERPL: 4
CA-I BLD-MCNC: 8.4 MG/DL (ref 8.5–10.5)
CHLORIDE SERPL-SCNC: 95 MMOL/L (ref 94–111)
CO2 SERPL-SCNC: 27 MMOL/L (ref 21–33)
CREAT SERPL-MCNC: 7.7 MG/DL (ref 0.7–1.2)
DIFFERENTIAL METHOD BLD: ABNORMAL
EOSINOPHIL # BLD: 0.1 K/UL (ref 0–0.4)
EOSINOPHIL NFR BLD: 1 % (ref 0–5)
ERYTHROCYTE [DISTWIDTH] IN BLOOD BY AUTOMATED COUNT: 15.6 % (ref 11.6–14.5)
GLUCOSE SERPL-MCNC: 62 MG/DL (ref 70–110)
HCT VFR BLD AUTO: 24.2 % (ref 35–45)
HGB BLD-MCNC: 7.9 G/DL (ref 12–16)
IMM GRANULOCYTES # BLD AUTO: 0 K/UL
IMM GRANULOCYTES NFR BLD AUTO: 0 %
LIPASE SERPL-CCNC: 37 U/L (ref 10–57)
LYMPHOCYTES # BLD: 1.9 K/UL (ref 0.9–3.6)
LYMPHOCYTES NFR BLD: 27 % (ref 21–52)
MCH RBC QN AUTO: 36.4 PG (ref 24–34)
MCHC RBC AUTO-ENTMCNC: 32.6 G/DL (ref 31–37)
MCV RBC AUTO: 111.5 FL (ref 74–97)
MONOCYTES # BLD: 0.3 K/UL (ref 0.05–1.2)
MONOCYTES NFR BLD: 5 % (ref 3–10)
NEUTS SEG # BLD: 4.5 K/UL (ref 1.8–8)
NEUTS SEG NFR BLD: 66 % (ref 40–73)
PLATELET # BLD AUTO: 277 K/UL (ref 135–420)
PMV BLD AUTO: 9.3 FL (ref 9.2–11.8)
POTASSIUM SERPL-SCNC: 3.8 MMOL/L (ref 3.2–5.1)
RBC # BLD AUTO: 2.17 M/UL (ref 4.2–5.3)
RBC MORPH BLD: ABNORMAL
RBC MORPH BLD: ABNORMAL
SODIUM SERPL-SCNC: 134 MMOL/L (ref 135–145)
TROPONIN I SERPL-MCNC: 0.02 NG/ML (ref 0.02–0.05)
WBC # BLD AUTO: 6.9 K/UL (ref 4.6–13.2)

## 2021-07-30 PROCEDURE — 83690 ASSAY OF LIPASE: CPT

## 2021-07-30 PROCEDURE — 36415 COLL VENOUS BLD VENIPUNCTURE: CPT

## 2021-07-30 PROCEDURE — 80048 BASIC METABOLIC PNL TOTAL CA: CPT

## 2021-07-30 PROCEDURE — 96372 THER/PROPH/DIAG INJ SC/IM: CPT

## 2021-07-30 PROCEDURE — 75810000275 HC EMERGENCY DEPT VISIT NO LEVEL OF CARE

## 2021-07-30 PROCEDURE — 74011250636 HC RX REV CODE- 250/636: Performed by: EMERGENCY MEDICINE

## 2021-07-30 PROCEDURE — 99284 EMERGENCY DEPT VISIT MOD MDM: CPT

## 2021-07-30 PROCEDURE — 84484 ASSAY OF TROPONIN QUANT: CPT

## 2021-07-30 PROCEDURE — 74176 CT ABD & PELVIS W/O CONTRAST: CPT

## 2021-07-30 PROCEDURE — 74011250637 HC RX REV CODE- 250/637: Performed by: EMERGENCY MEDICINE

## 2021-07-30 PROCEDURE — 85025 COMPLETE CBC W/AUTO DIFF WBC: CPT

## 2021-07-30 RX ORDER — ONDANSETRON 2 MG/ML
4 INJECTION INTRAMUSCULAR; INTRAVENOUS
Status: DISCONTINUED | OUTPATIENT
Start: 2021-07-30 | End: 2021-07-30

## 2021-07-30 RX ORDER — ONDANSETRON 4 MG/1
4 TABLET, ORALLY DISINTEGRATING ORAL
Status: COMPLETED | OUTPATIENT
Start: 2021-07-30 | End: 2021-07-30

## 2021-07-30 RX ORDER — ONDANSETRON 4 MG/1
4 TABLET, ORALLY DISINTEGRATING ORAL
Qty: 14 TABLET | Refills: 0 | Status: SHIPPED | OUTPATIENT
Start: 2021-07-30 | End: 2021-11-23 | Stop reason: ALTCHOICE

## 2021-07-30 RX ORDER — HYDROMORPHONE HYDROCHLORIDE 1 MG/ML
1 INJECTION, SOLUTION INTRAMUSCULAR; INTRAVENOUS; SUBCUTANEOUS
Status: COMPLETED | OUTPATIENT
Start: 2021-07-30 | End: 2021-07-30

## 2021-07-30 RX ORDER — HYDROMORPHONE HYDROCHLORIDE 1 MG/ML
0.5 INJECTION, SOLUTION INTRAMUSCULAR; INTRAVENOUS; SUBCUTANEOUS ONCE
Status: DISCONTINUED | OUTPATIENT
Start: 2021-07-30 | End: 2021-07-30

## 2021-07-30 RX ORDER — PANTOPRAZOLE SODIUM 20 MG/1
20 TABLET, DELAYED RELEASE ORAL DAILY
Qty: 10 TABLET | Refills: 0 | Status: ON HOLD | OUTPATIENT
Start: 2021-07-30 | End: 2021-08-10

## 2021-07-30 RX ADMIN — HYDROMORPHONE HYDROCHLORIDE 1 MG: 1 INJECTION, SOLUTION INTRAMUSCULAR; INTRAVENOUS; SUBCUTANEOUS at 21:27

## 2021-07-30 RX ADMIN — ONDANSETRON 4 MG: 4 TABLET, ORALLY DISINTEGRATING ORAL at 21:26

## 2021-07-30 NOTE — PROGRESS NOTES
Tell patient that the biopsies taken in her stomach showed gastritis/inflammation. No infection was noted. She should continue the omeprazole 20 mg daily. Repeat esophageal dilatation as needed.

## 2021-07-31 ENCOUNTER — TELEPHONE (OUTPATIENT)
Dept: GASTROENTEROLOGY | Age: 66
End: 2021-07-31

## 2021-07-31 NOTE — TELEPHONE ENCOUNTER
I called patient, spoke with daughter [id number] [facility],Heather Casas. Explained her test showed gastritis , no infection. Continue Omeprazole. And may have ED as needed. She said her mom had to go to ER yesterday. Abd pain, could not eat, is on dialysis and gets weak. She is not much better today, she is going to take her back to ER.

## 2021-07-31 NOTE — TELEPHONE ENCOUNTER
----- Message from Ana María Traore MD sent at 7/30/2021  4:19 PM EDT -----  Tell patient that the biopsies taken in her stomach showed gastritis/inflammation. No infection was noted. She should continue the omeprazole 20 mg daily. Repeat esophageal dilatation as needed.

## 2021-07-31 NOTE — ED NOTES
Pt reports moderate relief form pain s/p medication. Pt provided orange juice to sip d/t BS 62. Pt sipping without difficulty.

## 2021-07-31 NOTE — ED TRIAGE NOTES
Pt presents via EMS for bilat UQ and LLQ abd pain, has had it for aprox 3 mos but worse today.  Endorses N/V.

## 2021-07-31 NOTE — ED NOTES
EDMD aware of multiple attempts to establish PIV and blood specimens. Modified orders received. Medicated per MAR.

## 2021-07-31 NOTE — ED PROVIDER NOTES
Pt c/o wax and waning mid abd pain x 3-4 weeks. Says had endoscopy w diliation x 3 weeks ago for same, but pain worsening.  + n/v x 3 in last 24 hours. No back or chest pain. No sob. No meds for pain pta. On hd, last few hours pta as scheduled, compliant. Says does urinate, dx w uti recently, still on abx per pt. No urinary or bowel changes. No rash. No leg pain or swelling. No fever or chills. Pain severe constant over last few hours.             Past Medical History:   Diagnosis Date    Anemia NEC     Arrhythmia     Asthma     Asthma     Burning with urination     frequent uti    Chronic kidney disease     dialysis    CMV (cytomegalovirus) antibody positive     Dialysis patient ([facility] [address]     M/W/F    Dyspepsia and other specified disorders of function of stomach     stomach ulcer    Essential hypertension     GERD (gastroesophageal reflux disease)     Hypercholesteremia     Hypertension     Migraine     Obesity     Renal cyst 2002    kidney transplant     Ulcer        Past Surgical History:   Procedure Laterality Date    COLONOSCOPY      Dr Deshawn Salinas  5yrs ago    ENDOSCOPY VISIT-OUTPATIENT      Dr Deshawn Salinas  5 yrs ago    ENDOSCOPY, COLON, DIAGNOSTIC      with Dr. Li Carrillo HX [address] [place] [address]  02/2021    HX GI      ulcer    HX HEENT      sinus surg    HX RENAL TRANSPLANT      HX TRANSPLANT      kidney transplant 2002    VASCULAR SURGERY PROCEDURE UNLIST      shunt in prep for dialysis         Family History:   Problem Relation Age of Onset    Colon Polyps Brother     Cancer Mother     Diabetes Father        Social History     Socioeconomic History    Marital status:      Spouse name: Not on file    Number of children: Not on file    Years of education: Not on file    Highest education level: Not on file   Occupational History    Not on file   Tobacco Use    Smoking status: Never Smoker    Smokeless tobacco: Never Used   Vaping Use    Vaping Use: Never used   Substance and Sexual Activity    Alcohol use: No    Drug use: No    Sexual activity: Not Currently   Other Topics Concern    Not on file   Social History Narrative    Not on file     Social Determinants of Health     Financial Resource Strain:     Difficulty of Paying Living Expenses:    Food Insecurity:     Worried About Running Out of Food in the Last Year:     [address] in the Last Year:    Transportation Needs:     Lack of Transportation (Medical):  Lack of Transportation (Non-Medical):    Physical Activity:     Days of Exercise per Week:     Minutes of Exercise per Session:    Stress:     Feeling of Stress :    Social Connections:     Frequency of Communication with Friends and Family:     Frequency of Social Gatherings with Friends and Family:     Attends [employer] Services:     Active Member of Clubs or Organizations:     Attends Club or Organization Meetings:     Marital Status:    Intimate Partner Violence:     Fear of Current or Ex-Partner:     Emotionally Abused:     Physically Abused:     Sexually Abused: ALLERGIES: Aspirin, Bactrim [sulfamethoxazole-trimethoprim], Bromfenac, Cefepime, Ceftriaxone, Copper, Ibuprofen, Ketorolac tromethamine, Morphine, Relafen [nabumetone], Rifampin, and Vancomycin    Review of Systems   Constitutional: Negative for fever. HENT: Negative for congestion. Respiratory: Negative for cough and shortness of breath. Cardiovascular: Negative for chest pain. Gastrointestinal: Positive for abdominal pain, nausea and vomiting. Musculoskeletal: Negative for back pain. Skin: Negative for rash. Neurological: Negative for light-headedness. All other systems reviewed and are negative. Vitals:    07/30/21 2006 07/30/21 2324   BP: (!) 148/69 132/62   Pulse: 86 85   Resp: 20 18   Temp: 99 °F (37.2 °C)    SpO2: 100% 100%   Weight: 67.6 kg (149 lb)    Height: 5' 1\" (1.549 m)             Physical Exam  Vitals and nursing note reviewed. Constitutional:       Appearance: She is well-developed. She is not diaphoretic. HENT:      Head: Normocephalic and atraumatic. Eyes:      Pupils: Pupils are equal, round, and reactive to light. Cardiovascular:      Rate and Rhythm: Normal rate and regular rhythm. Heart sounds: No murmur heard. Pulmonary:      Effort: Pulmonary effort is normal.      Breath sounds: No wheezing. Abdominal:      Palpations: Abdomen is soft. Tenderness: There is abdominal tenderness in the periumbilical area. There is no guarding or rebound. Negative signs include Salinas's sign. Musculoskeletal:         General: No tenderness. Cervical back: Normal range of motion. Skin:     General: Skin is dry. Capillary Refill: Capillary refill takes less than 2 seconds. Findings: No rash. Neurological:      Mental Status: She is alert and oriented to person, place, and time. MDM       Procedures    Vitals:  No data found. Medications ordered:   Medications   HYDROmorphone (DILAUDID) injection 1 mg (1 mg IntraMUSCular Given 7/30/21 2127)   ondansetron (ZOFRAN ODT) tablet 4 mg (4 mg Oral Given 7/30/21 2126)         Lab findings:  No results found for this or any previous visit (from the past 12 hour(s)). X-Ray, CT or other radiology findings or impressions:  CT ABD PELV WO CONT   Final Result      1. No acute process of the abdomen or pelvis. Incidental findings detailed   above. This report has been generated using voice recognition software. Progress notes, Consult notes or additional Procedure notes:   9:44 PM nursing unable to obtain blood on mult attempts. after verbal conset obtained. Under sterile cond I inserted 20 ml syruine on 18 g needle left fem vein and obtained 15 ml blood w no diff. Pt waylon well. 11:04 PM pt feels better, told of all findings, declines further ed eval or tx, wants dc now. Says to f/u w gi, has been seen for this w recent endoscopy. Juice given and tolerated well for gluc of 62  Not c/w acute abd/aaa/cad/sepsis/dissection. Stable for dc and close per pt req. Detailed ret inst given. No emc      Diagnosis:   1. Abdominal pain, unspecified abdominal location    2. Anemia, unspecified type        Disposition: home    Follow-up Information     Follow up With Specialties Details Why Contact Info    [facility] EMERGENCY DEPT Emergency Medicine Go to  As needed [facility] [phone] [facility] [facility] Gastro[facility]ology Gastroenterology Schedule an appointment as soon as possible for a visit in 2 days  [address] Taylor Brown [address] [place] [address]  [phone]             Discharge Medication List as of 7/30/2021 11:03 PM      START taking these medications    Details   pantoprazole (Protonix) 20 mg tablet Take 1 Tablet by mouth daily for 10 days. , Normal, Disp-10 Tablet, R-0         CONTINUE these medications which have CHANGED    Details   ondansetron (Zofran ODT) 4 mg disintegrating tablet Take 1 Tablet by mouth every eight (8) hours as needed for Nausea., Normal, Disp-14 Tablet, R-0         CONTINUE these medications which have NOT CHANGED    Details   fluconazole (DIFLUCAN) 150 mg tablet TAKE 1 TABLET BY MOUTH DAILY FOR 4 DAYS, Normal, Disp-4 Tablet, R-0      cephALEXin (KEFLEX) 500 mg capsule Take 1 Capsule by mouth four (4) times daily for 10 days. , Normal, Disp-40 Capsule, R-0      Wixela Inhub 250-50 mcg/dose diskus inhaler INHALE 1 PUFF BY MOUTH EVERY 12 HOURS, Normal, Disp-60 Each, R-3      ondansetron hcl (ZOFRAN) 4 mg tablet Take 1 Tablet by mouth every eight (8) hours as needed for Nausea, Vomiting or Nausea or Vomiting., Normal, Disp-10 Tablet, R-0      RenaPlex-D 800 mcg-12.5 mg -2,000 unit tab Take 1 Tablet by mouth daily. , Historical Med, RITU      sevelamer carbonate (RENVELA) 800 mg tab tab Take 800 mg by mouth three (3) times daily. , Historical Med      zolpidem (AMBIEN) 5 mg tablet Take 5 mg by mouth nightly as needed., Historical Med      levothyroxine (SYNTHROID) 50 mcg tablet TAKE 1 TABLET BY MOUTH EVERY DAY, Normal, Disp-90 Tab, R-3      meclizine (ANTIVERT) 25 mg tablet TAKE 1 TABLET BY MOUTH THREE TIMES DAILY FOR UP TO 10 DAYS AS NEEDED FOR DIZZINESS, Normal, Disp-21 Tab, R-0      dicyclomine (BENTYL) 10 mg capsule TAKE 1 CAPSULE BY MOUTH FOUR TIMES DAILY, Normal, Disp-120 Cap, R-1      sucralfate (CARAFATE) 1 gram tablet TAKE 1 TABLET BY MOUTH FOUR TIMES DAILY, Normal, Disp-360 Tab, R-0      carvediloL (COREG) 6.25 mg tablet Take 1 Tab by mouth two (2) times daily (with meals). , Normal, Disp-180 Tab, R-2      valGANciclovir (VALCYTE) 450 mg tablet Take 2 Tabs by mouth daily. , Normal, Disp-90 Tab, R-5      simvastatin (ZOCOR) 20 mg tablet TAKE 1 TABLET BY MOUTH DAILY AS DIRECTED., Normal, Disp-90 Tab, R-1      sertraline (ZOLOFT) 50 mg tablet Take 1 Tab by mouth daily. , Normal, Disp-90 Tab, R-0      albuterol (PROVENTIL VENTOLIN) 2.5 mg /3 mL (0.083 %) nebu USE 1 VIAL VIA NEBULIZER THREE TIMES DAILY, Normal, Disp-90 mL, R-3      amiodarone (CORDARONE) 200 mg tablet TAKE 1 TABLET BY MOUTH EVERY DAY, Normal, Disp-90 Tab, R-2      Dexilant 60 mg CpDB capsule (delayed release) TAKE ONE CAPSULE BY MOUTH EVERY DAY, Normal, Disp-30 Cap, R-5      metoprolol tartrate (LOPRESSOR) 50 mg tablet Take 50 mg by mouth daily. , Historical Med      aluminum & magnesium hydroxide-simethicone (Maalox Maximum Strength) 400-400-40 mg/5 mL suspension Take 10 mL by mouth every six (6) hours as needed for Indigestion. , Normal, Disp-250 mL,R-0      ESTRACE 0.01 % (0.1 mg/gram) vaginal cream INSERT 2 GRAMS INTO VAGINA EVERY MONDAY AND THURSDAY, Normal, Disp-42.5 g,R-5      cranberry extract 425 mg cap 425 mg., Historical Med      LORazepam (ATIVAN) 0.5 mg tablet 0.5 mg., Historical Med      fluticasone (FLONASE) 50 mcg/actuation nasal spray 1 Spray., Historical Med      hyoscyamine SL (LEVSIN/SL) 0.125 mg SL tablet 0.125 mg by SubLINGual route every four (4) hours as needed. Historical Med, 0.125 mg      montelukast (SINGULAIR) 10 mg tablet Take 10 mg by mouth daily.   Historical Med, 10 mg      EPINEPHrine (EPIPEN) 0.3 mg/0.3 mL injection 0.3 mg., Historical Med

## 2021-07-31 NOTE — TELEPHONE ENCOUNTER
----- Message from Ramy Phillips MD sent at 7/30/2021  4:19 PM EDT -----  Tell patient that the biopsies taken in her stomach showed gastritis/inflammation. No infection was noted. She should continue the omeprazole 20 mg daily. Repeat esophageal dilatation as needed.

## 2021-08-01 ENCOUNTER — PATIENT OUTREACH (OUTPATIENT)
Dept: CASE MANAGEMENT | Age: 66
End: 2021-08-01

## 2021-08-02 NOTE — PROGRESS NOTES
Contacted patient for Transition of Care  follow up. Call within 2 business days of discharge: Yes   Attempted to reach patient by telephone. Made two call attempts. No answer. No patient identifiers on voicemail. Mailbox Full. Made Several attempts to reach patient today. Attempts were unsuccessful. Episode resolved.

## 2021-08-09 ENCOUNTER — HOSPITAL ENCOUNTER (INPATIENT)
Age: 66
LOS: 2 days | Discharge: HOME HEALTH | DRG: 811 | End: 2021-08-13
Attending: FAMILY MEDICINE | Admitting: INTERNAL MEDICINE
Payer: MEDICARE

## 2021-08-09 ENCOUNTER — APPOINTMENT (OUTPATIENT)
Dept: GENERAL RADIOLOGY | Age: 66
DRG: 811 | End: 2021-08-09
Attending: FAMILY MEDICINE
Payer: MEDICARE

## 2021-08-09 DIAGNOSIS — R11.2 INTRACTABLE VOMITING WITH NAUSEA, UNSPECIFIED VOMITING TYPE: Primary | ICD-10-CM

## 2021-08-09 PROBLEM — R11.10 INTRACTABLE VOMITING: Status: ACTIVE | Noted: 2021-08-09

## 2021-08-09 LAB
ALBUMIN SERPL-MCNC: 3.5 G/DL (ref 3.5–4.7)
ALBUMIN/GLOB SERPL: 1 {RATIO}
ALP SERPL-CCNC: 76 U/L (ref 38–126)
ALT SERPL-CCNC: 8 U/L (ref 3–52)
ANION GAP SERPL CALC-SCNC: 13 MMOL/L
AST SERPL W P-5'-P-CCNC: 15 U/L (ref 14–74)
BASOPHILS # BLD: 0.1 K/UL (ref 0–0.1)
BASOPHILS NFR BLD: 1 % (ref 0–2)
BILIRUB SERPL-MCNC: 0.9 MG/DL (ref 0.2–1)
BUN SERPL-MCNC: 23 MG/DL (ref 9–21)
BUN/CREAT SERPL: 3
CA-I BLD-MCNC: 8.6 MG/DL (ref 8.5–10.5)
CHLORIDE SERPL-SCNC: 96 MMOL/L (ref 94–111)
CO2 SERPL-SCNC: 25 MMOL/L (ref 21–33)
CREAT SERPL-MCNC: 8.8 MG/DL (ref 0.7–1.2)
EOSINOPHIL # BLD: 0.1 K/UL (ref 0–0.4)
EOSINOPHIL NFR BLD: 1 % (ref 0–5)
ERYTHROCYTE [DISTWIDTH] IN BLOOD BY AUTOMATED COUNT: 13.9 % (ref 11.6–14.5)
GLOBULIN SER CALC-MCNC: 3.6 G/DL
GLUCOSE SERPL-MCNC: 67 MG/DL (ref 70–110)
HCT VFR BLD AUTO: 31.2 % (ref 35–45)
HGB BLD-MCNC: 10 G/DL (ref 12–16)
IMM GRANULOCYTES # BLD AUTO: 0 K/UL
IMM GRANULOCYTES NFR BLD AUTO: 0 %
LIPASE SERPL-CCNC: 32 U/L (ref 10–57)
LYMPHOCYTES # BLD: 1.8 K/UL (ref 0.9–3.6)
LYMPHOCYTES NFR BLD: 34 % (ref 21–52)
MAGNESIUM SERPL-MCNC: 2.1 MG/DL (ref 1.7–2.8)
MCH RBC QN AUTO: 35.3 PG (ref 24–34)
MCHC RBC AUTO-ENTMCNC: 32.1 G/DL (ref 31–37)
MCV RBC AUTO: 110.2 FL (ref 74–97)
MONOCYTES # BLD: 0.6 K/UL (ref 0.05–1.2)
MONOCYTES NFR BLD: 12 % (ref 3–10)
NEUTS SEG # BLD: 2.6 K/UL (ref 1.8–8)
NEUTS SEG NFR BLD: 52 % (ref 40–73)
PLATELET # BLD AUTO: 312 K/UL (ref 135–420)
PLATELET COMMENTS,PCOM: ADEQUATE
PMV BLD AUTO: 8.8 FL (ref 9.2–11.8)
POTASSIUM SERPL-SCNC: 3.5 MMOL/L (ref 3.2–5.1)
PROT SERPL-MCNC: 7.1 G/DL (ref 6.1–8.4)
RBC # BLD AUTO: 2.83 M/UL (ref 4.2–5.3)
RBC MORPH BLD: ABNORMAL
RBC MORPH BLD: ABNORMAL
SODIUM SERPL-SCNC: 134 MMOL/L (ref 135–145)
TROPONIN I SERPL-MCNC: <0.02 NG/ML (ref 0.02–0.05)
WBC # BLD AUTO: 5.2 K/UL (ref 4.6–13.2)

## 2021-08-09 PROCEDURE — 96375 TX/PRO/DX INJ NEW DRUG ADDON: CPT

## 2021-08-09 PROCEDURE — 80053 COMPREHEN METABOLIC PANEL: CPT

## 2021-08-09 PROCEDURE — 99285 EMERGENCY DEPT VISIT HI MDM: CPT

## 2021-08-09 PROCEDURE — 85025 COMPLETE CBC W/AUTO DIFF WBC: CPT

## 2021-08-09 PROCEDURE — 74011250636 HC RX REV CODE- 250/636: Performed by: EMERGENCY MEDICINE

## 2021-08-09 PROCEDURE — 84484 ASSAY OF TROPONIN QUANT: CPT

## 2021-08-09 PROCEDURE — 96365 THER/PROPH/DIAG IV INF INIT: CPT

## 2021-08-09 PROCEDURE — 83690 ASSAY OF LIPASE: CPT

## 2021-08-09 PROCEDURE — 71045 X-RAY EXAM CHEST 1 VIEW: CPT

## 2021-08-09 PROCEDURE — 96376 TX/PRO/DX INJ SAME DRUG ADON: CPT

## 2021-08-09 PROCEDURE — 83735 ASSAY OF MAGNESIUM: CPT

## 2021-08-09 PROCEDURE — 74011000258 HC RX REV CODE- 258: Performed by: EMERGENCY MEDICINE

## 2021-08-09 RX ORDER — ONDANSETRON 2 MG/ML
4 INJECTION INTRAMUSCULAR; INTRAVENOUS
Status: COMPLETED | OUTPATIENT
Start: 2021-08-09 | End: 2021-08-09

## 2021-08-09 RX ORDER — DEXTROSE MONOHYDRATE AND SODIUM CHLORIDE 5; .9 G/100ML; G/100ML
250 INJECTION, SOLUTION INTRAVENOUS CONTINUOUS
Status: DISCONTINUED | OUTPATIENT
Start: 2021-08-09 | End: 2021-08-10

## 2021-08-09 RX ADMIN — ONDANSETRON 4 MG: 2 INJECTION INTRAMUSCULAR; INTRAVENOUS at 21:47

## 2021-08-09 RX ADMIN — PROMETHAZINE HYDROCHLORIDE 12.5 MG: 25 INJECTION INTRAMUSCULAR; INTRAVENOUS at 23:06

## 2021-08-09 RX ADMIN — DEXTROSE AND SODIUM CHLORIDE 250 ML: 5; 900 INJECTION, SOLUTION INTRAVENOUS at 20:50

## 2021-08-09 RX ADMIN — ONDANSETRON 4 MG: 2 INJECTION INTRAMUSCULAR; INTRAVENOUS at 20:50

## 2021-08-09 NOTE — ED PROVIDER NOTES
EMERGENCY DEPARTMENT HISTORY AND PHYSICAL EXAM      Date: 8/9/2021  Patient Name: Markel Cuenca    History of Presenting Illness     Chief Complaint   Patient presents with    Fatigue    Diarrhea       History Provided By: Patient    HPI: Markel Cuenca, 61 y.o. female with a past medical history significant Multiple medical problems presents to the ED with cc of fatigue and diarrhea. Patient was seen here over a week ago with some abdominal pain and diarrhea and had a negative CT of her abdomen. A few days ago she was seen at [facility] and diagnosed with diverticulitis. She was started on antibiotics for that. She was unable to tolerate dialysis today because she felt bad. She is had diarrhea and weakness. She has no appetite. She denies any fevers or chills. There are no other complaints, changes, or physical findings at this time. PCP: Carlee Zambrano MD    No current facility-administered medications on file prior to encounter. Current Outpatient Medications on File Prior to Encounter   Medication Sig Dispense Refill    ondansetron (Zofran ODT) 4 mg disintegrating tablet Take 1 Tablet by mouth every eight (8) hours as needed for Nausea. 14 Tablet 0    pantoprazole (Protonix) 20 mg tablet Take 1 Tablet by mouth daily for 10 days. 10 Tablet 0    fluconazole (DIFLUCAN) 150 mg tablet TAKE 1 TABLET BY MOUTH DAILY FOR 4 DAYS 4 Tablet 0    Wixela Inhub 250-50 mcg/dose diskus inhaler INHALE 1 PUFF BY MOUTH EVERY 12 HOURS 60 Each 3    ondansetron hcl (ZOFRAN) 4 mg tablet Take 1 Tablet by mouth every eight (8) hours as needed for Nausea, Vomiting or Nausea or Vomiting. 10 Tablet 0    RenaPlex-D 800 mcg-12.5 mg -2,000 unit tab Take 1 Tablet by mouth daily.  sevelamer carbonate (RENVELA) 800 mg tab tab Take 800 mg by mouth three (3) times daily.  zolpidem (AMBIEN) 5 mg tablet Take 5 mg by mouth nightly as needed.       levothyroxine (SYNTHROID) 50 mcg tablet TAKE 1 TABLET BY MOUTH EVERY DAY 90 Tab 3    meclizine (ANTIVERT) 25 mg tablet TAKE 1 TABLET BY MOUTH THREE TIMES DAILY FOR UP TO 10 DAYS AS NEEDED FOR DIZZINESS 21 Tab 0    dicyclomine (BENTYL) 10 mg capsule TAKE 1 CAPSULE BY MOUTH FOUR TIMES DAILY 120 Cap 1    sucralfate (CARAFATE) 1 gram tablet TAKE 1 TABLET BY MOUTH FOUR TIMES DAILY 360 Tab 0    carvediloL (COREG) 6.25 mg tablet Take 1 Tab by mouth two (2) times daily (with meals). 180 Tab 2    valGANciclovir (VALCYTE) 450 mg tablet Take 2 Tabs by mouth daily. 90 Tab 5    simvastatin (ZOCOR) 20 mg tablet TAKE 1 TABLET BY MOUTH DAILY AS DIRECTED. 90 Tab 1    sertraline (ZOLOFT) 50 mg tablet Take 1 Tab by mouth daily. 90 Tab 0    albuterol (PROVENTIL VENTOLIN) 2.5 mg /3 mL (0.083 %) nebu USE 1 VIAL VIA NEBULIZER THREE TIMES DAILY 90 mL 3    amiodarone (CORDARONE) 200 mg tablet TAKE 1 TABLET BY MOUTH EVERY DAY 90 Tab 2    Dexilant 60 mg CpDB capsule (delayed release) TAKE ONE CAPSULE BY MOUTH EVERY DAY 30 Cap 5    metoprolol tartrate (LOPRESSOR) 50 mg tablet Take 50 mg by mouth daily.  aluminum & magnesium hydroxide-simethicone (Maalox Maximum Strength) 400-400-40 mg/5 mL suspension Take 10 mL by mouth every six (6) hours as needed for Indigestion. 250 mL 0    ESTRACE 0.01 % (0.1 mg/gram) vaginal cream INSERT 2 GRAMS INTO VAGINA EVERY MONDAY AND THURSDAY 42.5 g 5    cranberry extract 425 mg cap 425 mg.  LORazepam (ATIVAN) 0.5 mg tablet 0.5 mg.      fluticasone (FLONASE) 50 mcg/actuation nasal spray 1 Spray.  EPINEPHrine (EPIPEN) 0.3 mg/0.3 mL injection 0.3 mg.      hyoscyamine SL (LEVSIN/SL) 0.125 mg SL tablet 0.125 mg by SubLINGual route every four (4) hours as needed.  montelukast (SINGULAIR) 10 mg tablet Take 10 mg by mouth daily.            Past History     Past Medical History:  Past Medical History:   Diagnosis Date    Anemia NEC     Arrhythmia     Asthma     Asthma     Burning with urination     frequent uti    Chronic kidney disease dialysis    CMV (cytomegalovirus) antibody positive     Dialysis patient ([facility] [address]     M/W/F    Dyspepsia and other specified disorders of function of stomach     stomach ulcer    Essential hypertension     GERD (gastroesophageal reflux disease)     Hypercholesteremia     Hypertension     Migraine     Obesity     Renal cyst 2002    kidney transplant     Ulcer        Past Surgical History:  Past Surgical History:   Procedure Laterality Date    COLONOSCOPY      Dr Welsh Son  5yrs ago    ENDOSCOPY VISIT-OUTPATIENT      Dr Welsh Son  5 yrs ago    ENDOSCOPY, COLON, DIAGNOSTIC      with Dr. Laney Gabriel  [facility]  02/2021    HX GI      ulcer    HX HEENT      sinus surg    HX RENAL TRANSPLANT      HX TRANSPLANT      kidney transplant 2002    VASCULAR SURGERY PROCEDURE UNLIST      shunt in prep for dialysis       Family History:  Family History   Problem Relation Age of Onset    Colon Polyps Brother     Cancer Mother     Diabetes Father        Social History:  Social History     Tobacco Use    Smoking status: Never Smoker    Smokeless tobacco: Never Used   Vaping Use    Vaping Use: Never used   Substance Use Topics    Alcohol use: No    Drug use: No       Allergies: Allergies   Allergen Reactions    Aspirin Rash and Unknown (comments)    Bactrim [Sulfamethoxazole-Trimethoprim] Rash and Unknown (comments)    Bromfenac Rash and Unknown (comments)    Cefepime Other (comments)     Neurological reaction    Ceftriaxone Rash    Copper Rash    Ibuprofen Rash and Unknown (comments)    Ketorolac Tromethamine Rash and Unknown (comments)    Morphine Rash and Unknown (comments)    Relafen [Nabumetone] Rash and Unknown (comments)    Rifampin Rash and Unknown (comments)    Vancomycin Rash and Unknown (comments)     Pt reports causes itching, takes benadryl prior to use. Pt denies anaphylaxis.     Requires benadryl with each vancomycin dose         Review of Systems     Review of Systems Constitutional: Positive for appetite change and fatigue. Negative for fever. HENT: Negative for rhinorrhea and sore throat. Respiratory: Negative for cough and shortness of breath. Cardiovascular: Negative for chest pain and palpitations. Gastrointestinal: Positive for diarrhea and nausea. Negative for abdominal pain and vomiting. Genitourinary: Negative for difficulty urinating and dysuria. Musculoskeletal: Negative for arthralgias and myalgias. Skin: Negative for color change and rash. Neurological: Positive for weakness. Negative for light-headedness and headaches. Physical Exam     Physical Exam  Vitals and nursing note reviewed. Constitutional:       General: She is awake. She is not in acute distress. Appearance: Normal appearance. She is well-developed. She is obese. She is ill-appearing. She is not toxic-appearing or diaphoretic. Interventions: Face mask in place. HENT:      Head: Normocephalic and atraumatic. Eyes:      Conjunctiva/sclera: Conjunctivae normal.      Pupils: Pupils are equal, round, and reactive to light. Cardiovascular:      Rate and Rhythm: Normal rate and regular rhythm. Pulses: Normal pulses. Heart sounds: Normal heart sounds. Pulmonary:      Effort: Pulmonary effort is normal.      Breath sounds: Normal breath sounds. Abdominal:      General: Abdomen is flat. Palpations: Abdomen is soft. Tenderness: There is no abdominal tenderness. Musculoskeletal:      Cervical back: Normal range of motion and neck supple. Right lower leg: No edema. Left lower leg: No edema. Skin:     General: Skin is warm and dry. Neurological:      General: No focal deficit present. Mental Status: She is alert and oriented to person, place, and time. GCS: GCS eye subscore is 4. GCS verbal subscore is 5. GCS motor subscore is 6.    Psychiatric:         Mood and Affect: Mood and affect normal.         Behavior: Behavior normal. Behavior is cooperative. Thought Content: Thought content normal.         Lab and Diagnostic Study Results     Labs -   No results found for this or any previous visit (from the past 12 hour(s)). Radiologic Studies -   @lastxrresult@  CT Results  (Last 48 hours)    None        CXR Results  (Last 48 hours)    None            Medical Decision Making   - I am the first provider for this patient. - I reviewed the vital signs, available nursing notes, past medical history, past surgical history, family history and social history. - Initial assessment performed. The patients presenting problems have been discussed, and they are in agreement with the care plan formulated and outlined with them. I have encouraged them to ask questions as they arise throughout their visit. Vital Signs-Reviewed the patient's vital signs. Patient Vitals for the past 12 hrs:   Temp Pulse Resp BP SpO2   08/09/21 1758    (!) 173/76    08/09/21 1754 98.4 °F (36.9 °C)       08/09/21 1747  92 16  100 %       Records Reviewed: Nursing Notes and Old Medical Records    The patient presents with diarrhea with a differential diagnosis of generalized weakness, dehydration, electrolyte abnormality, diverticulitis. ED Course:      Is checked out to Dr. Jose F Jackson who assumed care of the patient made the final disposition. Provider Notes (Medical Decision Making): MDM       Procedures   Medical Decision Makingedical Decision Making  Performed by: Joseph Crenshaw MD  PROCEDURES:  Procedures       Disposition   Disposition:            Diagnosis     CLINICAL IMPRESSION    1. Intractable vomiting with nausea, unspecified vomiting type        Attestations:    Joseph Crenshaw MD    Please note that this dictation was completed with Tripl, the computer voice recognition software.   Quite often unanticipated grammatical, syntax, homophones, and other interpretive errors are inadvertently transcribed by the computer software. Please disregard these errors. Please excuse any errors that have escaped final proofreading. Thank you.

## 2021-08-09 NOTE — ED NOTES
Bedside and Verbal shift change report given to krysta (oncoming nurse) by matthew (offgoing nurse). Report included the following information SBAR.

## 2021-08-09 NOTE — ED TRIAGE NOTES
EMS called to residence for pt that c/o nausea vomiting and diarrhea and feeling weak x 1 week. Pt was seen at [facility] last pm and discharged home and attempted to go to dialysis today but was unable to do treatment.

## 2021-08-10 LAB
ANION GAP SERPL CALC-SCNC: 12 MMOL/L
BASOPHILS # BLD: 0 K/UL (ref 0–0.1)
BASOPHILS NFR BLD: 1 % (ref 0–2)
BUN SERPL-MCNC: 22 MG/DL (ref 9–21)
BUN/CREAT SERPL: 2
CA-I BLD-MCNC: 8.1 MG/DL (ref 8.5–10.5)
CHLORIDE SERPL-SCNC: 99 MMOL/L (ref 94–111)
CO2 SERPL-SCNC: 25 MMOL/L (ref 21–33)
CREAT SERPL-MCNC: 9.2 MG/DL (ref 0.7–1.2)
EOSINOPHIL # BLD: 0.1 K/UL (ref 0–0.4)
EOSINOPHIL NFR BLD: 1 % (ref 0–5)
ERYTHROCYTE [DISTWIDTH] IN BLOOD BY AUTOMATED COUNT: 13.7 % (ref 11.6–14.5)
GLUCOSE BLD STRIP.AUTO-MCNC: 84 MG/DL (ref 70–110)
GLUCOSE SERPL-MCNC: 93 MG/DL (ref 70–110)
HCT VFR BLD AUTO: 24.4 % (ref 35–45)
HGB BLD-MCNC: 7.5 G/DL (ref 12–16)
IMM GRANULOCYTES # BLD AUTO: 0 K/UL
IMM GRANULOCYTES NFR BLD AUTO: 0 %
LYMPHOCYTES # BLD: 1.3 K/UL (ref 0.9–3.6)
LYMPHOCYTES NFR BLD: 25 % (ref 21–52)
MCH RBC QN AUTO: 34.4 PG (ref 24–34)
MCHC RBC AUTO-ENTMCNC: 30.7 G/DL (ref 31–37)
MCV RBC AUTO: 111.9 FL (ref 74–97)
MONOCYTES # BLD: 0.7 K/UL (ref 0.05–1.2)
MONOCYTES NFR BLD: 14 % (ref 3–10)
NEUTS SEG # BLD: 3 K/UL (ref 1.8–8)
NEUTS SEG NFR BLD: 59 % (ref 40–73)
PERFORMED BY, TECHID: NORMAL
PLATELET # BLD AUTO: 308 K/UL (ref 135–420)
PMV BLD AUTO: 9.1 FL (ref 9.2–11.8)
POTASSIUM SERPL-SCNC: 4.1 MMOL/L (ref 3.2–5.1)
RBC # BLD AUTO: 2.18 M/UL (ref 4.2–5.3)
SODIUM SERPL-SCNC: 136 MMOL/L (ref 135–145)
WBC # BLD AUTO: 5 K/UL (ref 4.6–13.2)

## 2021-08-10 PROCEDURE — 94664 DEMO&/EVAL PT USE INHALER: CPT

## 2021-08-10 PROCEDURE — 96372 THER/PROPH/DIAG INJ SC/IM: CPT

## 2021-08-10 PROCEDURE — 85025 COMPLETE CBC W/AUTO DIFF WBC: CPT

## 2021-08-10 PROCEDURE — 96376 TX/PRO/DX INJ SAME DRUG ADON: CPT

## 2021-08-10 PROCEDURE — 36415 COLL VENOUS BLD VENIPUNCTURE: CPT

## 2021-08-10 PROCEDURE — 74011250637 HC RX REV CODE- 250/637: Performed by: INTERNAL MEDICINE

## 2021-08-10 PROCEDURE — 86900 BLOOD TYPING SEROLOGIC ABO: CPT

## 2021-08-10 PROCEDURE — 99218 HC RM OBSERVATION: CPT

## 2021-08-10 PROCEDURE — 94640 AIRWAY INHALATION TREATMENT: CPT

## 2021-08-10 PROCEDURE — 80048 BASIC METABOLIC PNL TOTAL CA: CPT

## 2021-08-10 PROCEDURE — 97161 PT EVAL LOW COMPLEX 20 MIN: CPT

## 2021-08-10 PROCEDURE — 74011000258 HC RX REV CODE- 258: Performed by: PHYSICIAN ASSISTANT

## 2021-08-10 PROCEDURE — 82962 GLUCOSE BLOOD TEST: CPT

## 2021-08-10 PROCEDURE — 74011250636 HC RX REV CODE- 250/636: Performed by: PHYSICIAN ASSISTANT

## 2021-08-10 PROCEDURE — 74011000250 HC RX REV CODE- 250: Performed by: PHYSICIAN ASSISTANT

## 2021-08-10 PROCEDURE — 74011636637 HC RX REV CODE- 636/637: Performed by: PHYSICIAN ASSISTANT

## 2021-08-10 PROCEDURE — 96375 TX/PRO/DX INJ NEW DRUG ADDON: CPT

## 2021-08-10 PROCEDURE — 74011250637 HC RX REV CODE- 250/637: Performed by: PHYSICIAN ASSISTANT

## 2021-08-10 RX ORDER — HEPARIN SODIUM 5000 [USP'U]/ML
5000 INJECTION, SOLUTION INTRAVENOUS; SUBCUTANEOUS EVERY 8 HOURS
Status: DISCONTINUED | OUTPATIENT
Start: 2021-08-10 | End: 2021-08-13 | Stop reason: HOSPADM

## 2021-08-10 RX ORDER — PROMETHAZINE HYDROCHLORIDE 25 MG/1
12.5 TABLET ORAL
Status: DISCONTINUED | OUTPATIENT
Start: 2021-08-10 | End: 2021-08-13 | Stop reason: HOSPADM

## 2021-08-10 RX ORDER — SODIUM CHLORIDE 0.9 % (FLUSH) 0.9 %
5-40 SYRINGE (ML) INJECTION EVERY 8 HOURS
Status: DISCONTINUED | OUTPATIENT
Start: 2021-08-10 | End: 2021-08-13 | Stop reason: HOSPADM

## 2021-08-10 RX ORDER — ALBUTEROL SULFATE 0.83 MG/ML
2.5 SOLUTION RESPIRATORY (INHALATION)
Status: DISCONTINUED | OUTPATIENT
Start: 2021-08-10 | End: 2021-08-13 | Stop reason: HOSPADM

## 2021-08-10 RX ORDER — HYOSCYAMINE SULFATE 0.12 MG/1
0.12 TABLET, ORALLY DISINTEGRATING ORAL
Status: DISCONTINUED | OUTPATIENT
Start: 2021-08-10 | End: 2021-08-13 | Stop reason: HOSPADM

## 2021-08-10 RX ORDER — DEXLANSOPRAZOLE 60 MG/1
60 CAPSULE, DELAYED RELEASE ORAL DAILY
Status: DISCONTINUED | OUTPATIENT
Start: 2021-08-10 | End: 2021-08-10

## 2021-08-10 RX ORDER — SEVELAMER CARBONATE 800 MG/1
800 TABLET, FILM COATED ORAL
Status: DISCONTINUED | OUTPATIENT
Start: 2021-08-10 | End: 2021-08-13 | Stop reason: HOSPADM

## 2021-08-10 RX ORDER — HYDRALAZINE HYDROCHLORIDE 20 MG/ML
10 INJECTION INTRAMUSCULAR; INTRAVENOUS
Status: DISCONTINUED | OUTPATIENT
Start: 2021-08-10 | End: 2021-08-13 | Stop reason: HOSPADM

## 2021-08-10 RX ORDER — HYDRALAZINE HYDROCHLORIDE 20 MG/ML
10 INJECTION INTRAMUSCULAR; INTRAVENOUS ONCE
Status: DISCONTINUED | OUTPATIENT
Start: 2021-08-10 | End: 2021-08-10

## 2021-08-10 RX ORDER — FAMOTIDINE 20 MG/1
20 TABLET, FILM COATED ORAL 2 TIMES DAILY
Status: DISCONTINUED | OUTPATIENT
Start: 2021-08-10 | End: 2021-08-10

## 2021-08-10 RX ORDER — FLUTICASONE PROPIONATE 50 MCG
1 SPRAY, SUSPENSION (ML) NASAL DAILY
Status: DISCONTINUED | OUTPATIENT
Start: 2021-08-10 | End: 2021-08-13 | Stop reason: HOSPADM

## 2021-08-10 RX ORDER — ATORVASTATIN CALCIUM 10 MG/1
10 TABLET, FILM COATED ORAL
Status: DISCONTINUED | OUTPATIENT
Start: 2021-08-10 | End: 2021-08-13 | Stop reason: HOSPADM

## 2021-08-10 RX ORDER — ONDANSETRON 2 MG/ML
4 INJECTION INTRAMUSCULAR; INTRAVENOUS
Status: DISCONTINUED | OUTPATIENT
Start: 2021-08-10 | End: 2021-08-13 | Stop reason: HOSPADM

## 2021-08-10 RX ORDER — LEVOFLOXACIN 5 MG/ML
250 INJECTION, SOLUTION INTRAVENOUS
Status: DISCONTINUED | OUTPATIENT
Start: 2021-08-10 | End: 2021-08-13 | Stop reason: HOSPADM

## 2021-08-10 RX ORDER — DEXTROSE MONOHYDRATE AND SODIUM CHLORIDE 5; .9 G/100ML; G/100ML
100 INJECTION, SOLUTION INTRAVENOUS CONTINUOUS
Status: DISCONTINUED | OUTPATIENT
Start: 2021-08-10 | End: 2021-08-11

## 2021-08-10 RX ORDER — LEVOTHYROXINE SODIUM 50 UG/1
50 TABLET ORAL
Status: DISCONTINUED | OUTPATIENT
Start: 2021-08-10 | End: 2021-08-13 | Stop reason: HOSPADM

## 2021-08-10 RX ORDER — SERTRALINE HYDROCHLORIDE 50 MG/1
50 TABLET, FILM COATED ORAL DAILY
Status: DISCONTINUED | OUTPATIENT
Start: 2021-08-10 | End: 2021-08-13 | Stop reason: HOSPADM

## 2021-08-10 RX ORDER — LIDOCAINE AND PRILOCAINE 25; 25 MG/G; MG/G
CREAM TOPICAL AS DIRECTED
Status: ON HOLD | COMMUNITY
Start: 2021-07-23 | End: 2022-04-08

## 2021-08-10 RX ORDER — MONTELUKAST SODIUM 10 MG/1
10 TABLET ORAL DAILY
Status: DISCONTINUED | OUTPATIENT
Start: 2021-08-10 | End: 2021-08-13 | Stop reason: HOSPADM

## 2021-08-10 RX ORDER — DICYCLOMINE HYDROCHLORIDE 10 MG/1
10 CAPSULE ORAL 4 TIMES DAILY
Status: DISCONTINUED | OUTPATIENT
Start: 2021-08-10 | End: 2021-08-13 | Stop reason: HOSPADM

## 2021-08-10 RX ORDER — PREDNISONE 5 MG/1
5 TABLET ORAL
Status: DISCONTINUED | OUTPATIENT
Start: 2021-08-10 | End: 2021-08-13 | Stop reason: HOSPADM

## 2021-08-10 RX ORDER — CALCITRIOL 0.25 UG/1
0.25 CAPSULE ORAL DAILY
Status: DISCONTINUED | OUTPATIENT
Start: 2021-08-10 | End: 2021-08-13 | Stop reason: HOSPADM

## 2021-08-10 RX ORDER — MECLIZINE HCL 12.5 MG 12.5 MG/1
25 TABLET ORAL
Status: DISCONTINUED | OUTPATIENT
Start: 2021-08-10 | End: 2021-08-13 | Stop reason: HOSPADM

## 2021-08-10 RX ORDER — FAMOTIDINE 20 MG/1
1 TABLET, FILM COATED ORAL 2 TIMES DAILY
Status: ON HOLD | COMMUNITY
Start: 2021-07-31 | End: 2021-08-10

## 2021-08-10 RX ORDER — MAG HYDROX/ALUMINUM HYD/SIMETH 200-200-20
10 SUSPENSION, ORAL (FINAL DOSE FORM) ORAL
Status: DISCONTINUED | OUTPATIENT
Start: 2021-08-10 | End: 2021-08-13 | Stop reason: HOSPADM

## 2021-08-10 RX ORDER — SODIUM CHLORIDE 9 MG/ML
250 INJECTION, SOLUTION INTRAVENOUS AS NEEDED
Status: DISCONTINUED | OUTPATIENT
Start: 2021-08-10 | End: 2021-08-13 | Stop reason: HOSPADM

## 2021-08-10 RX ORDER — SODIUM CHLORIDE 0.9 % (FLUSH) 0.9 %
5-40 SYRINGE (ML) INJECTION AS NEEDED
Status: DISCONTINUED | OUTPATIENT
Start: 2021-08-10 | End: 2021-08-13 | Stop reason: HOSPADM

## 2021-08-10 RX ORDER — AMIODARONE HYDROCHLORIDE 200 MG/1
200 TABLET ORAL DAILY
Status: DISCONTINUED | OUTPATIENT
Start: 2021-08-10 | End: 2021-08-13 | Stop reason: HOSPADM

## 2021-08-10 RX ORDER — OXYCODONE AND ACETAMINOPHEN 5; 325 MG/1; MG/1
1 TABLET ORAL
Status: DISCONTINUED | OUTPATIENT
Start: 2021-08-10 | End: 2021-08-13 | Stop reason: HOSPADM

## 2021-08-10 RX ORDER — BUDESONIDE AND FORMOTEROL FUMARATE DIHYDRATE 160; 4.5 UG/1; UG/1
1 AEROSOL RESPIRATORY (INHALATION)
Status: DISCONTINUED | OUTPATIENT
Start: 2021-08-10 | End: 2021-08-13 | Stop reason: HOSPADM

## 2021-08-10 RX ORDER — ACETAMINOPHEN 650 MG/1
650 SUPPOSITORY RECTAL
Status: DISCONTINUED | OUTPATIENT
Start: 2021-08-10 | End: 2021-08-13 | Stop reason: HOSPADM

## 2021-08-10 RX ORDER — PANTOPRAZOLE SODIUM 40 MG/1
40 TABLET, DELAYED RELEASE ORAL DAILY
Status: DISCONTINUED | OUTPATIENT
Start: 2021-08-10 | End: 2021-08-13 | Stop reason: HOSPADM

## 2021-08-10 RX ORDER — METOPROLOL TARTRATE 50 MG/1
50 TABLET ORAL DAILY
Status: DISCONTINUED | OUTPATIENT
Start: 2021-08-10 | End: 2021-08-10

## 2021-08-10 RX ORDER — CARVEDILOL 6.25 MG/1
6.25 TABLET ORAL 2 TIMES DAILY WITH MEALS
Status: DISCONTINUED | OUTPATIENT
Start: 2021-08-10 | End: 2021-08-13 | Stop reason: HOSPADM

## 2021-08-10 RX ORDER — METRONIDAZOLE 250 MG/1
500 TABLET ORAL EVERY 12 HOURS
Status: DISCONTINUED | OUTPATIENT
Start: 2021-08-10 | End: 2021-08-13 | Stop reason: HOSPADM

## 2021-08-10 RX ORDER — CALCITRIOL 0.5 UG/1
0.5 CAPSULE ORAL SEE ADMIN INSTRUCTIONS
COMMUNITY
Start: 2021-07-19

## 2021-08-10 RX ORDER — POLYETHYLENE GLYCOL 3350 17 G/17G
17 POWDER, FOR SOLUTION ORAL DAILY PRN
Status: DISCONTINUED | OUTPATIENT
Start: 2021-08-10 | End: 2021-08-13 | Stop reason: HOSPADM

## 2021-08-10 RX ORDER — ESTRADIOL 0.1 MG/G
2 CREAM VAGINAL
Status: DISCONTINUED | OUTPATIENT
Start: 2021-08-12 | End: 2021-08-13 | Stop reason: HOSPADM

## 2021-08-10 RX ORDER — SUCRALFATE 1 G/1
1 TABLET ORAL
Status: DISCONTINUED | OUTPATIENT
Start: 2021-08-10 | End: 2021-08-13 | Stop reason: HOSPADM

## 2021-08-10 RX ORDER — ALBUTEROL SULFATE 0.83 MG/ML
1.25 SOLUTION RESPIRATORY (INHALATION)
Status: DISCONTINUED | OUTPATIENT
Start: 2021-08-10 | End: 2021-08-10

## 2021-08-10 RX ORDER — ZOLPIDEM TARTRATE 5 MG/1
5 TABLET ORAL
Status: DISCONTINUED | OUTPATIENT
Start: 2021-08-10 | End: 2021-08-13 | Stop reason: HOSPADM

## 2021-08-10 RX ORDER — SEVELAMER CARBONATE 800 MG/1
800 TABLET, FILM COATED ORAL 3 TIMES DAILY
Status: DISCONTINUED | OUTPATIENT
Start: 2021-08-10 | End: 2021-08-10

## 2021-08-10 RX ORDER — VALGANCICLOVIR 450 MG/1
900 TABLET, FILM COATED ORAL DAILY
Status: DISCONTINUED | OUTPATIENT
Start: 2021-08-10 | End: 2021-08-13 | Stop reason: HOSPADM

## 2021-08-10 RX ORDER — ACETAMINOPHEN 325 MG/1
650 TABLET ORAL
Status: DISCONTINUED | OUTPATIENT
Start: 2021-08-10 | End: 2021-08-13 | Stop reason: HOSPADM

## 2021-08-10 RX ORDER — PREDNISONE 5 MG/1
5 TABLET ORAL DAILY
COMMUNITY
Start: 2021-02-08 | End: 2021-11-23 | Stop reason: ALTCHOICE

## 2021-08-10 RX ADMIN — LEVOFLOXACIN 250 MG: 5 INJECTION, SOLUTION INTRAVENOUS at 08:48

## 2021-08-10 RX ADMIN — ATORVASTATIN CALCIUM 10 MG: 10 TABLET, FILM COATED ORAL at 04:03

## 2021-08-10 RX ADMIN — ONDANSETRON 4 MG: 2 INJECTION INTRAMUSCULAR; INTRAVENOUS at 21:17

## 2021-08-10 RX ADMIN — BUDESONIDE AND FORMOTEROL FUMARATE DIHYDRATE 1 PUFF: 160; 4.5 AEROSOL RESPIRATORY (INHALATION) at 20:34

## 2021-08-10 RX ADMIN — PROMETHAZINE HYDROCHLORIDE 12.5 MG: 25 TABLET ORAL at 15:56

## 2021-08-10 RX ADMIN — LEVOTHYROXINE SODIUM 50 MCG: 0.05 TABLET ORAL at 05:14

## 2021-08-10 RX ADMIN — MONTELUKAST 10 MG: 10 TABLET, FILM COATED ORAL at 08:49

## 2021-08-10 RX ADMIN — Medication 10 ML: at 05:16

## 2021-08-10 RX ADMIN — ALBUTEROL SULFATE 1.25 MG: 2.5 SOLUTION RESPIRATORY (INHALATION) at 07:28

## 2021-08-10 RX ADMIN — METRONIDAZOLE 500 MG: 250 TABLET ORAL at 20:10

## 2021-08-10 RX ADMIN — SUCRALFATE 1 G: 1 TABLET ORAL at 15:55

## 2021-08-10 RX ADMIN — ONDANSETRON 4 MG: 2 INJECTION INTRAMUSCULAR; INTRAVENOUS at 10:57

## 2021-08-10 RX ADMIN — Medication 10 ML: at 14:31

## 2021-08-10 RX ADMIN — OXYCODONE HYDROCHLORIDE AND ACETAMINOPHEN 1 TABLET: 5; 325 TABLET ORAL at 04:16

## 2021-08-10 RX ADMIN — FLUTICASONE PROPIONATE 1 SPRAY: 50 SPRAY, METERED NASAL at 12:16

## 2021-08-10 RX ADMIN — DICYCLOMINE HYDROCHLORIDE 10 MG: 10 CAPSULE ORAL at 21:18

## 2021-08-10 RX ADMIN — SEVELAMER CARBONATE 800 MG: 800 TABLET, FILM COATED ORAL at 16:44

## 2021-08-10 RX ADMIN — AMIODARONE HYDROCHLORIDE 200 MG: 200 TABLET ORAL at 08:46

## 2021-08-10 RX ADMIN — CARVEDILOL 6.25 MG: 6.25 TABLET, FILM COATED ORAL at 17:08

## 2021-08-10 RX ADMIN — ATORVASTATIN CALCIUM 10 MG: 10 TABLET, FILM COATED ORAL at 21:18

## 2021-08-10 RX ADMIN — SUCRALFATE 1 G: 1 TABLET ORAL at 12:15

## 2021-08-10 RX ADMIN — DEXTROSE AND SODIUM CHLORIDE 100 ML/HR: 5; 900 INJECTION, SOLUTION INTRAVENOUS at 14:26

## 2021-08-10 RX ADMIN — SEVELAMER CARBONATE 800 MG: 800 TABLET, FILM COATED ORAL at 08:50

## 2021-08-10 RX ADMIN — HEPARIN SODIUM 5000 UNITS: 5000 INJECTION INTRAVENOUS; SUBCUTANEOUS at 05:14

## 2021-08-10 RX ADMIN — DICYCLOMINE HYDROCHLORIDE 10 MG: 10 CAPSULE ORAL at 12:15

## 2021-08-10 RX ADMIN — PREDNISONE 5 MG: 5 TABLET ORAL at 08:49

## 2021-08-10 RX ADMIN — BUDESONIDE AND FORMOTEROL FUMARATE DIHYDRATE 1 PUFF: 160; 4.5 AEROSOL RESPIRATORY (INHALATION) at 11:51

## 2021-08-10 RX ADMIN — CALCITRIOL CAPSULES 0.25 MCG 0.25 MCG: 0.25 CAPSULE ORAL at 08:47

## 2021-08-10 RX ADMIN — DICYCLOMINE HYDROCHLORIDE 10 MG: 10 CAPSULE ORAL at 17:08

## 2021-08-10 RX ADMIN — DEXTROSE AND SODIUM CHLORIDE 100 ML/HR: 5; 900 INJECTION, SOLUTION INTRAVENOUS at 04:02

## 2021-08-10 RX ADMIN — METRONIDAZOLE 500 MG: 250 TABLET ORAL at 08:48

## 2021-08-10 RX ADMIN — HEPARIN SODIUM 5000 UNITS: 5000 INJECTION INTRAVENOUS; SUBCUTANEOUS at 14:33

## 2021-08-10 RX ADMIN — ONDANSETRON 4 MG: 2 INJECTION INTRAMUSCULAR; INTRAVENOUS at 04:16

## 2021-08-10 RX ADMIN — Medication 10 ML: at 21:20

## 2021-08-10 RX ADMIN — Medication 1 TABLET: at 08:47

## 2021-08-10 RX ADMIN — SUCRALFATE 1 G: 1 TABLET ORAL at 21:18

## 2021-08-10 RX ADMIN — SERTRALINE 50 MG: 50 TABLET, FILM COATED ORAL at 08:50

## 2021-08-10 RX ADMIN — PANTOPRAZOLE SODIUM 40 MG: 40 TABLET, DELAYED RELEASE ORAL at 12:15

## 2021-08-10 NOTE — PROGRESS NOTES
Reason for Admission:  Chart reviewed and noted patient presented with C/O fatigue and diarrhea. Pt has had longstanding GI history with similar problem and previous C-Difficile and VRE. Recently at [facility] a study showed diverticulitis and she was started on antibiotics. Dx: Intractable Vomiting    PMH: Anemia, Arrhythmia, Asthma, Burning with Urination, Chronic Kidney Disease, CMV, Dialysis Patient, Dyspepsia, HTN, GERD, Hypercholesteremia, Migraine, Obesity, Renal Cyst, Ulcer                     RUR Score: NA                    Plan for utilizing home health: TBD         PCP: First and Last name:  Óscar Serrano MD     Name of Practice:    Are you a current patient: Yes/No:    Approximate date of last visit:    Can you participate in a virtual visit with your PCP:                     Current Advanced Directive/Advance Care Plan: Full Code- No ACP      Healthcare Decision Maker: Ailyn Alonzo  Click here to complete [id number] including selection of the Healthcare Decision Maker Relationship (ie \"Primary\")                             Transition of Care Plan:   TBD    Care Management Interventions  PCP Verified by CM: Yes  Transition of Care Consult (CM Consult):  Discharge Planning  Current Support Network: Mauro Harris [phone].

## 2021-08-10 NOTE — PROGRESS NOTES
Femoral line changed at this time due to coming off and leaking. Patient laying in bed, denies any other needs at this time. IVF Infusing as ordered. Patient stated that sevelmer was taken with meals, will change times to be given with meals.

## 2021-08-10 NOTE — PROGRESS NOTES
Nurse administered medication for nausea. IVF infusing as ordered. Denies any other needs at this time. Will continue to monitor.

## 2021-08-10 NOTE — RT PROTOCOL NOTE
RT Nebulizer Bronchodilator Protocol Note    There is a bronchodilator order in the chart from a provider indicating to follow the RT Bronchodilator Protocol and there is an Initiate RT Bronchodilator Protocol order as well (see protocol at bottom of note). The findings from the last RT Protocol Assessment were as follows:  Smoking: <15 Pack years  Surgical Status: No surgery  Xray: Clear  Respiratory Pattern: RR 12-20  Mental Status: Alert and oriented  Breath Sounds: Diminished and/or crackles  Cough: Strong, spontaneous, non-productive  Activity Level: Walking unassisted  Oxygen Requirement: Room Air - 2LNC/28% or home setting  Indication for Bronchodilator Therapy: On home bronchodilators (Albuterol BID PRN)  Bronchodilator Assessment Score: 2    Aerosolized bronchodilator medication orders have been revised according to the RT Bronchodilator Protocol. RT Bronchodilator Protocol:    Respiratory Therapist to perform RT Therapy Protocol Assessment then follow the protocol. No Indications  adjust the frequency to every 6 hours PRN wheezing or bronchospasm, if no treatments needed after 48 hours then discontinue using Per Protocol order mode. If indication present, adjust the RT bronchodilator orders based on the Bronchodilator Assessment Score as follows:    0-6  enter or revise RT Bronchodilator order to Albuterol Nebulizer order with frequency of every 2 hours PRN for wheezing or increased work of breathing using Per Protocol order mode. Repeat RT Therapy Protocol Assessment as needed. 7-10 - discontinue any other Inpatient aerosolized bronchodilator medication orders and enter or revise two Albuterol Nebulizer orders, one with BID frequency and one with frequency of every 2 hours PRN wheezing or increased work of breathing using Per Protocol order mode. Repeat RT Therapy Protocol Assessment with second treatment then BID and as needed.       11-13 - discontinue any other Inpatient aerosolized bronchodilator medication orders and enter [employer] order with QID frequency and an Albuterol Nebulizer order with frequency of every 2 hours PRN wheezing or increased work of breathing using Per Protocol order mode. Repeat RT Therapy Protocol Assessment with second treatment then QID and as needed. Greater than 13 - discontinue any other Inpatient aerosolized bronchodilator medication orders and enter a DuoNeb Nebulizer order with every 4 hours frequency and an Albuterol Nebulizer order with frequency of every 2 hours PRN wheezing or increased work of breathing using Per Protocol order mode. Repeat RT Therapy Protocol Assessment with second treatment then every 4 hours and as needed. RT to enter RT Home Evaluation for COPD & MDI Assessment order using Per Protocol order mode.     Electronically signed by RT Ministerio on 8/10/2021 at 8:27 AM

## 2021-08-10 NOTE — PROGRESS NOTES
Scheduled medications administered at this time, nurse held coreg due to potential dialysis. Patient laying in bed with eyes closed, easily arousalable. Denies any needs or pain at this time. Will continue to monitor.

## 2021-08-10 NOTE — PROGRESS NOTES
Pt admitted to room 252. Transferred from stretcher to bed via sliding technique x4 staff members. Interview questions completed. Placed pt on telemetry box 2E #5 and is currently running NSR. Placed pt into hospital gown and placed brief. New dressing to triple lumen CVL placed as current dressing has fallen off. CVL is 23 cm from insertion site to tip of longest port. Oriented pt to room, call light and bed controls.  Bed locked in lowest position, side rails up x2 CBWR

## 2021-08-10 NOTE — H&P
History and Physical    Subjective:     Constantino Montague is a 61 y.o. female  has a past medical history of Anemia NEC, Arrhythmia, Asthma, Asthma, Burning with urination, Chronic kidney disease, CMV (cytomegalovirus) antibody positive, Dialysis patient ([facility] [address] Dyspepsia and other specified disorders of function of stomach, Essential hypertension, GERD (gastroesophageal reflux disease), Hypercholesteremia, Hypertension, Migraine, Obesity, Renal cyst (2002), and Ulcer. Patient presents to emergency department with complaint of fatigue and diarrhea. Patient has had longstanding GI history with similar problem and previous C. difficile and VRE. She does see GI and she is currently on numerous medications for the same. She was seen in our facility a week ago and yesterday at [facility] for today's presenting problem. Yesterday's study showed diverticulitis and she was started on antibiotic. Patient was unable to complete dialysis yesterday due to the nausea and diarrhea. Her potassium is slightly elevated while her creatinine is 8.8 and likely trending higher. Patient evaluated in her room and found to be sleeping reported 9/10 pain and nauseous. States that she has been having GI problems for quite a while is a worse now. Patient will be admitted for intractable nausea vomiting and general supportive care but length of stay 1 midnight.         Past Medical History:   Diagnosis Date    Anemia NEC     Arrhythmia     Asthma     Asthma     Burning with urination     frequent uti    Chronic kidney disease     dialysis    CMV (cytomegalovirus) antibody positive     Dialysis patient ([facility] [address]     M/W/F    Dyspepsia and other specified disorders of function of stomach     stomach ulcer    Essential hypertension     GERD (gastroesophageal reflux disease)     Hypercholesteremia     Hypertension     Migraine     Obesity     Renal cyst 2002    kidney transplant     Ulcer       Past Surgical History: Procedure Laterality Date    COLONOSCOPY      Dr Laura Ramirez  5yrs ago   [place] VISIT-OUTPATIENT      Dr Laura Ramirez  5 yrs ago    ENDOSCOPY, COLON, DIAGNOSTIC      with Dr. Dominic Gaona HX CHOLECYSTECTOMY  02/2021    HX GI      ulcer    HX HEENT      sinus surg    HX RENAL TRANSPLANT      HX TRANSPLANT      kidney transplant 2002    VASCULAR SURGERY PROCEDURE UNLIST      shunt in prep for dialysis     Family History   Problem Relation Age of Onset    Colon Polyps Brother     Cancer Mother     Diabetes Father       Social History     Tobacco Use    Smoking status: Never Smoker    Smokeless tobacco: Never Used   Substance Use Topics    Alcohol use: No       Prior to Admission medications    Medication Sig Start Date End Date Taking? Authorizing Provider   calcitRIOL (ROCALTROL) 0.25 mcg capsule Take 1 Capsule by mouth as directed. Take on non dialysis days 7/19/21  Yes Other, MD Abelardo   doxercalciferol (HECTOROL IV) 14 mcg by IntraVENous route two (2) days a week. 3/12/21 3/11/22 Yes Other, MD Abelardo   famotidine (PEPCID) 20 mg tablet Take 1 Tablet by mouth two (2) times a day. 7/31/21  Yes Other, MD Abelardo   lidocaine-prilocaine (EMLA) topical cream as directed. 7/23/21  Yes Other, MD Abelardo   predniSONE (DELTASONE) 5 mg tablet Take 5 mg by mouth. 2/8/21  Yes Other, MD Abelardo   ondansetron (Zofran ODT) 4 mg disintegrating tablet Take 1 Tablet by mouth every eight (8) hours as needed for Nausea. 7/30/21  Yes Britton Dowling MD   fluconazole (DIFLUCAN) 150 mg tablet TAKE 1 TABLET BY MOUTH DAILY FOR 4 DAYS 7/26/21  Yes Kamlesh Neumann MD   Wixela Inhub 250-50 mcg/dose diskus inhaler INHALE 1 PUFF BY MOUTH EVERY 12 HOURS 7/3/21  Yes Kamlesh Neumann MD   ondansetron hcl [facility]) 4 mg tablet Take 1 Tablet by mouth every eight (8) hours as needed for Nausea, Vomiting or Nausea or Vomiting. 6/25/21  Yes Lauren Saldaña MD   RenaPlex-D 800 mcg-12.5 mg -2,000 unit tab Take 1 Tablet by mouth daily.  4/15/21  Yes Marleni, MD Abelardo   sevelamer carbonate (RENVELA) 800 mg tab tab Take 800 mg by mouth three (3) times daily. 6/8/21  Yes Abelardo Yepez MD   zolpidem (AMBIEN) 5 mg tablet Take 5 mg by mouth nightly as needed. 4/5/21  Yes Abelardo Yepez MD   meclizine (ANTIVERT) 25 mg tablet TAKE 1 TABLET BY MOUTH THREE TIMES DAILY FOR UP TO 10 DAYS AS NEEDED FOR DIZZINESS 4/27/21  Yes Red Paulino MD   dicyclomine (BENTYL) 10 mg capsule TAKE 1 CAPSULE BY MOUTH FOUR TIMES DAILY 4/15/21  Yes Red Paulino MD   sucralfate (CARAFATE) 1 gram tablet TAKE 1 TABLET BY MOUTH FOUR TIMES DAILY 3/28/21  Yes Red Paulino MD   carvediloL (COREG) 6.25 mg tablet Take 1 Tab by mouth two (2) times daily (with meals). 3/2/21  Yes Red Paulino MD   valGANciclovir (VALCYTE) 450 mg tablet Take 2 Tabs by mouth daily. 3/2/21  Yes Red Paulino MD   simvastatin (ZOCOR) 20 mg tablet TAKE 1 TABLET BY MOUTH DAILY AS DIRECTED. 2/19/21  Yes Red Paulino MD   sertraline (ZOLOFT) 50 mg tablet Take 1 Tab by mouth daily. 2/10/21  Yes Red Paulino MD   albuterol (PROVENTIL VENTOLIN) 2.5 mg /3 mL (0.083 %) nebu USE 1 VIAL VIA NEBULIZER THREE TIMES DAILY 2/8/21  Yes Red Paulino MD   amiodarone (CORDARONE) 200 mg tablet TAKE 1 TABLET BY MOUTH EVERY DAY 1/13/21  Yes Red Paulino MD   Dexilant 60 mg CpDB capsule (delayed release) TAKE ONE CAPSULE BY MOUTH EVERY DAY 12/22/20  Yes Red Paulino MD   metoprolol tartrate (LOPRESSOR) 50 mg tablet Take 50 mg by mouth daily. 2/22/19  Yes Abelardo Yepez MD   aluminum & magnesium hydroxide-simethicone (Maalox Maximum Strength) 400-400-40 mg/5 mL suspension Take 10 mL by mouth every six (6) hours as needed for Indigestion.  9/17/20  Yes Debra Champion MD   ESTRACE 0.01 % (0.1 mg/gram) vaginal cream INSERT 2 GRAMS INTO VAGINA EVERY MONDAY AND THURSDAY 4/11/17  Yes Rebecca Jimenez MD   cranberry extract 425 mg cap 425 mg. 1/17/14  Yes Provider, Historical   LORazepam (ATIVAN) 0.5 mg tablet 0.5 mg.   Yes Provider, Historical   fluticasone (FLONASE) 50 mcg/actuation nasal spray 1 Spray. Yes Provider, Historical   hyoscyamine SL (LEVSIN/SL) 0.125 mg SL tablet 0.125 mg by SubLINGual route every four (4) hours as needed. Yes Provider, Historical   montelukast (SINGULAIR) 10 mg tablet Take 10 mg by mouth daily. Yes Provider, Historical   methoxy peg-epoetin beta (MIRCERA INJECTION) 100 mcg. 8/2/21 8/1/22  Other, MD Abelardo   pantoprazole (Protonix) 20 mg tablet Take 1 Tablet by mouth daily for 10 days. 7/30/21 8/9/21  Med Hinds MD   levothyroxine (SYNTHROID) 50 mcg tablet TAKE 1 TABLET BY MOUTH EVERY DAY 5/12/21   Sundar Cano MD   EPINEPHrine [facility]) 0.3 mg/0.3 mL injection 0.3 mg. 10/1/16   Provider, Historical     Allergies   Allergen Reactions    Aspirin Rash and Unknown (comments)    Bactrim [Sulfamethoxazole-Trimethoprim] Rash and Unknown (comments)    Bromfenac Rash and Unknown (comments)    Cefepime Other (comments)     Neurological reaction    Ceftriaxone Rash    Copper Rash    Ibuprofen Rash and Unknown (comments)    Ketorolac Tromethamine Rash and Unknown (comments)    Morphine Rash and Unknown (comments)    Relafen [Nabumetone] Rash and Unknown (comments)    Rifampin Rash and Unknown (comments)    Vancomycin Rash and Unknown (comments)     Pt reports causes itching, takes benadryl prior to use. Pt denies anaphylaxis.     Requires benadryl with each vancomycin dose          REVIEW OF SYSTEMS:       Total of 12 systems reviewed as follows:       POSITIVE= underlined text  Negative = text not underlined  General:  fever, chills, sweats, generalized weakness, weight loss/gain,      loss of appetite   Eyes:    blurred vision, eye pain, loss of vision, double vision  ENT:    rhinorrhea, pharyngitis   Respiratory:  cough, sputum production, SOB, VILLA, wheezing, pleuritic pain   Cardiology:   chest pain, palpitations, orthopnea, PND, edema, syncope   Gastrointestinal: abdominal pain , N/V, diarrhea, dysphagia, constipation, bleeding   Genitourinary:  frequency, urgency, dysuria, hematuria, incontinence   Muskuloskeletal :  arthralgia, myalgia, back pain  Hematology: easy bruising, nose or gum bleeding, lymphadenopathy   Dermatological: rash, ulceration, pruritis, color change / jaundice  Endocrine:   hot flashes or polydipsia   Neurological:  headache, dizziness, confusion, focal weakness, paresthesia,     Speech difficulties, memory loss, gait difficulty  Psychological: Feelings of anxiety, depression, agitation       Objective:   VITALS:    Visit Vitals  BP (!) 147/74 (BP 1 Location: Right upper arm, BP Patient Position: At rest;Lying)   Pulse 85   Temp 98.4 °F (36.9 °C)   Resp 14   Ht 5' 1\" (1.549 m)   Wt 67.1 kg (148 lb)   SpO2 99%   BMI 27.96 kg/m²       PHYSICAL EXAM:    General:    Alert, cooperative, moderate distress, appears stated age. HEENT: Atraumatic, anicteric sclerae, pink conjunctivae     No oral ulcers, mucosa moist, throat clear, dentition fair  Neck:  Supple, symmetrical,  thyroid: non tender  Lungs:   Clear to auscultation bilaterally. No Wheezing or Rhonchi. No rales. Chest wall:  No tenderness  No Accessory muscle use. Heart:   Regular  rhythm,  No  murmur   No edema  Abdomen:   Soft, minimal generalized tenderness on palpation with some distention, bowel sounds normal  Extremities: No cyanosis. No clubbing,      Skin turgor normal, Capillary refill normal, Radial dial pulse 2+  Skin:     Not pale. Not Jaundiced  No rashes   Psych:  Good insight. Not depressed. Not anxious or agitated. Neurologic: EOMs intact. No facial asymmetry. No aphasia or slurred speech. Symmetrical strength,   Sensation grossly intact. Alert and oriented X 4. Given the patient's current clinical presentation, I have a high level of concern for decompensation if discharged from the emergency department.   Complex decision making was performed, which includes reviewing the patient's available past medical records, laboratory results, and x-ray films. _______________________________________________________________________  Care Plan discussed with:    Comments   Patient X    Family      RN X    Care Manager                    Consultant:      _______________________________________________________________________  Expected  Disposition:   Home with Family X   HH/PT/OT/RN    SNF/LTC    [facility]AMA    ________________________________________________________________________  TOTAL TIME:  48 Minutes    Critical Care Provided     Minutes non procedure based      Comments    X Reviewed previous records   >50% of visit spent in counseling and coordination of care X Discussion with patient and/or family and questions answered       ________________________________________________________________________    Labs:  Recent Results (from the past 24 hour(s))   CBC WITH AUTOMATED DIFF    Collection Time: 08/09/21  6:45 PM   Result Value Ref Range    WBC 5.2 4.6 - 13.2 K/uL    RBC 2.83 (L) 4.20 - 5.30 M/uL    HGB 10.0 (L) 12.0 - 16.0 g/dL    HCT 31.2 (L) 35.0 - 45.0 %    .2 (H) 74.0 - 97.0 FL    MCH 35.3 (H) 24.0 - 34.0 PG    MCHC 32.1 31.0 - 37.0 g/dL    RDW 13.9 11.6 - 14.5 %    PLATELET 145 829 - 869 K/uL    MPV 8.8 (L) 9.2 - 11.8 FL    NEUTROPHILS 52 40 - 73 %    LYMPHOCYTES 34 21 - 52 %    MONOCYTES 12 (H) 3 - 10 %    EOSINOPHILS 1 0 - 5 %    BASOPHILS 1 0 - 2 %    IMMATURE GRANULOCYTES 0 %    ABS. NEUTROPHILS 2.6 1.8 - 8.0 K/UL    ABS. LYMPHOCYTES 1.8 0.9 - 3.6 K/UL    ABS. MONOCYTES 0.6 0.05 - 1.2 K/UL    ABS. EOSINOPHILS 0.1 0.0 - 0.4 K/UL    ABS. BASOPHILS 0.1 0.0 - 0.1 K/UL    ABS. IMM.  GRANS. 0.0 K/UL    PLATELET COMMENTS Adequate      RBC COMMENTS Macrocytosis  2+        RBC COMMENTS Polychromasia  SLIGHT       METABOLIC PANEL, COMPREHENSIVE    Collection Time: 08/09/21  6:45 PM   Result Value Ref Range    Sodium 134 (L) 135 - 145 mmol/L    Potassium 3.5 3.2 - 5.1 mmol/L Chloride 96 94 - 111 mmol/L    CO2 25 21 - 33 mmol/L    Anion gap 13 mmol/L    Glucose 67 (L) 70 - 110 mg/dL    BUN 23 (H) 9 - 21 mg/dL    Creatinine 8.80 (H) 0.70 - 1.20 mg/dL    BUN/Creatinine ratio 3      GFR est AA 6 ml/min/1.73m2    GFR est non-AA 5 ml/min/1.73m2    Calcium 8.6 8.5 - 10.5 mg/dL    Bilirubin, total 0.9 0.2 - 1.0 mg/dL    AST (SGOT) 15 14 - 74 U/L    ALT (SGPT) 8 3 - 52 U/L    Alk. phosphatase 76 38 - 126 U/L    Protein, total 7.1 6.1 - 8.4 g/dL    Albumin 3.5 3.5 - 4.7 g/dL    Globulin 3.6 g/dL    A-G Ratio 1.0     LIPASE    Collection Time: 08/09/21  6:45 PM   Result Value Ref Range    Lipase 32 10 - 57 U/L   MAGNESIUM    Collection Time: 08/09/21  6:45 PM   Result Value Ref Range    Magnesium 2.1 1.7 - 2.8 mg/dL   TROPONIN I    Collection Time: 08/09/21  6:45 PM   Result Value Ref Range    Troponin-I, Qt. <0.02 (L) 0.02 - 0.05 ng/mL       Imaging:  XR CHEST PORT    Result Date: 8/9/2021  EXAM: XR CHEST PORT. CLINICAL INDICATION/HISTORY: weakness. -Additional: None. TECHNIQUE: A portable erect AP radiographic view of the chest. COMPARISON: Radiographs of 09/17/2020. _______________ FINDINGS: Lungs and pleural spaces: > No focal pulmonic consolidation is superimposed on diffuse interstitial prominence which may reflect mild pulmonary edema or chronic fibrotic changes. > No pneumothorax or appreciable significant pleural effusion. Cardiomediastinal silhouette: > Normal for age. Bones: > No acute findings. Other: > None. _______________     No focal pulmonic consolidation is superimposed on diffuse interstitial prominence which may reflect mild pulmonary edema or chronic fibrotic changes.  _______________        Assessment & Plan:       Intractable vomiting  -Continue with Phenergan Zofran  -Reduce IV fluids but continue  -Monitor for fluid overload  -Follow electrolytes    Gastritis and duodenitis  -Chronic abdominal pain  -Diverticulitis acute versus chronic  -Levaquin and Flagyl renally adjusted  -Continue GI medications PPI, Bentyl and Carafate  -Will need GI follow-up outpatient  -C. difficile panel    Essential hypertension  -Elevated on admission  -Trending downward  -As needed hydralazine    Anemia  -Etiology likely due to chronic disease  -Patient had previously received Epogen injections  -will likely need dialysis as she could not complete yesterday  -Type and screen 2 units PRBCs    ESRD (end stage renal disease) on dialysis ([facility] [address]  -Creatinine 8.8 and potassium 3.1  -Nephrology consultation  -May require hemodialysis        Code Status: Full    Prophylaxis: Heparin subcu    Electronically Signed : Bonifacio Llamas, PhD, PA-C, Hospital Medicine Service

## 2021-08-10 NOTE — ED NOTES
TRANSFER - OUT REPORT:    Verbal report given to Rafael Scruggs(name) on [facility]  being transferred to [address] [place] [address](unit) for routine progression of care       Report consisted of patients Situation, Background, Assessment and   Recommendations(SBAR). Information from the following report(s) ED Summary was reviewed with the receiving nurse. Lines:   Triple Lumen 08/10/21 Femoral (Active)   Central Line Being Utilized Yes 08/10/21 0030   Site Assessment Clean, dry, & intact 08/10/21 0030   Infiltration Assessment 0 08/10/21 0030   Date of Last Dressing Change 08/10/21 08/10/21 0030   Dressing Status Clean, dry, & intact 08/10/21 0030   Dressing Type Transparent 08/10/21 0030        Opportunity for questions and clarification was provided.       Patient transported with:   Monitor  Tech

## 2021-08-10 NOTE — PROGRESS NOTES
Assumed care of patient during bedside shift report. Patient laying in bed with eyes closed, respirationsare even and unlabored. CBWR.

## 2021-08-10 NOTE — ED NOTES
8:22 PM pt told of all findings. No sig changes. Gluc 67. Says here for n/v, was vomiting at hd so was stopped early, still nauseated, no change. zofran ordered for pt. Will reevaluate after treatment. 10:38 PM continued nausea after zofran x 2, phenergan ordered. 11:40 PM continued sx's, no change, d/w yani Ch, to admit to obs bed  Pt piv infiltrated, no iv access, nursing unable to obtain on mult attempts. I d/w pt at length she agrees to cvl  Rt femoral line site used for ease of placement and dec risks. .  After pt signed consent and timeout done, I used full sterile barriers inc gown/gloves/mask/drapes, sterilized right mid inguinal region w chlorxedine and inserted 7 fr triple lumen cath using through 18 g needle using guidewire on first attempt without difficulty. All 3 ports flushed w 10 ml ns without difficulty. Line sewn into place x 2 and sterile dressing applied. Pt tolerated well. I d/w admitting provider Elliot West temporary nature of access, if needed greater than 24-48 hours recommend arrange for pt to have medport or picc line inserted and remove cvl.

## 2021-08-10 NOTE — ED NOTES
IV site checked and found to be infiltrated. Removed dsd applied to site. Patient washed up for incontinence of stool small amount.

## 2021-08-10 NOTE — ASSESSMENT & PLAN NOTE
-Continue with Phenergan Zofran  -Reduce IV fluids but continue  -Monitor for fluid overload  -Follow electrolytes

## 2021-08-10 NOTE — PROGRESS NOTES
Problem: Mobility Impaired (Adult and Pediatric)  Goal: *Acute Goals and Plan of Care (Insert Text)  Description: Pt is (I)  with gait and is at PLOF . PLOF: Community ambulator who did not use an AD and who was (I) with ADLS. Outcome: Resolved/Met     Problem: Patient Education: Go to Patient Education Activity  Goal: Patient/Family Education  Outcome: Resolved/Met   PHYSICAL THERAPY EVALUATION AND DISCHARGE    Patient: Mona Baxter (14 y.o. female)  Date: 8/10/2021  Primary Diagnosis: Intractable vomiting [R11.10]        Precautions: N/A       ASSESSMENT :  Based on the objective data described below, the patient presents with intractable vomiting and gastritis. She also has anemia and an acute blood drop today. She does however perform mobility without difficulty. Patient does not require further skilled intervention at this level of care. PLAN :  Recommendations and Planned Interventions:   No formal PT needs identified at this time. Discharge Recommendations: None  Further Equipment Recommendations for Discharge: N/A     SUBJECTIVE:   Patient stated i'm just so cold.     OBJECTIVE DATA SUMMARY:     Past Medical History:   Diagnosis Date    Anemia NEC     Arrhythmia     Asthma     Asthma     Burning with urination     frequent uti    Chronic kidney disease     dialysis    CMV (cytomegalovirus) antibody positive     Dialysis patient ([facility]     M/W/F    Dyspepsia and other specified disorders of function of stomach     stomach ulcer    Essential hypertension     GERD (gastroesophageal reflux disease)     Hypercholesteremia     Hypertension     Migraine     Obesity     Renal cyst 2002    kidney transplant     Ulcer      Past Surgical History:   Procedure Laterality Date    COLONOSCOPY      Dr Leti Lua  5yrs ago    ENDOSCOPY VISIT-OUTPATIENT      Dr Leti Lua  5 yrs ago    ENDOSCOPY, COLON, DIAGNOSTIC      with Dr. Liza Guerin CHOLECYSTECTOMY  02/2021    HX GI      ulcer    HX HEENT      sinus surg    HX RENAL TRANSPLANT      HX TRANSPLANT      kidney transplant 2002    VASCULAR SURGERY PROCEDURE UNLIST      shunt in prep for dialysis     Barriers to Learning/Limitations: None  Compensate with: N/A  Home Situation:   Home Situation  Home Environment: Private residence  # Steps to Enter: 3  Rails to Enter: Yes  Hand Rails : Bilateral  One/Two Story Residence: One story  Living Alone: No  Support Systems: Family member(s)  Patient Expects to be Discharged to[de-identified] House  Current DME Used/Available at Home: None  Critical Behavior:  Neurologic State: Alert  Orientation Level: Oriented X4  Cognition: Appropriate decision making; Appropriate for age attention/concentration; Appropriate safety awareness; Follows commands     Psychosocial  Patient Behaviors: Calm;Flat affect  Purposeful Interaction: Yes  Caring Interventions: Reassure  Reassure: Caring rounds  Strength:    Strength: Within functional limits  Tone & Sensation:   Sensation: Intact  Range Of Motion:   AROM: Within functional limits  Posture:  Posture (WDL): Within defined limits  Functional Mobility:  Bed Mobility:  Rolling: Independent  Supine to Sit: Independent  Sit to Supine: Independent  Scooting: Independent  Transfers:  Sit to Stand: Independent  Stand to Sit: Independent  Balance:   Sitting: Intact  Standing: Intact  Ambulation/Gait Training:  Distance (ft): 80 Feet (ft)  Assistive Device: Other (comment) (NO AD)  Ambulation - Level of Assistance: Independent  Gait Description (WDL): Within defined limits  Pain:  Pain level pre-treatment: 8/10   Pain level post-treatment: 8/10  Pain Location: Abdomen  Pain Intervention(s): Medication (see MAR); Rest, Ice, Repositioning   Response to intervention: Nurse notified, See doc flow    Activity Tolerance:   Good  Please refer to the flowsheet for vital signs taken during this treatment.   After treatment:   []         Patient left in no apparent distress sitting up in chair  [x]         Patient left in no apparent distress in bed  [x]         Call bell left within reach  []         Nursing notified  []         Caregiver present  []         Bed alarm activated  []         SCDs applied    COMMUNICATION/EDUCATION:   [x]         Role of Physical Therapy in the acute care setting. []         Fall prevention education was provided and the patient/caregiver indicated understanding. [x]         Patient/family have participated as able in goal setting and plan of care. []         Patient/family agree to work toward stated goals and plan of care. []         Patient understands intent and goals of therapy, but is neutral about his/her participation. []         Patient is unable to participate in goal setting/plan of care: ongoing with therapy staff.  []         Other:     Thank you for this referral.  Abraham Pina, PT, DPT   Time Calculation: 15 mins

## 2021-08-10 NOTE — ASSESSMENT & PLAN NOTE
-Etiology likely due to chronic disease  -Patient had previously received Epogen injections  -will likely need dialysis as she could not complete yesterday  -Type and screen 2 units PRBCs

## 2021-08-10 NOTE — ED NOTES
Assisted Dr Jade Gandhi with triple lumen femoral catheter insertion. Central line dressing placed over insertion site. All lumens flushed with 10 ml saline and clamped.

## 2021-08-10 NOTE — PROGRESS NOTES
Advised of patients admission  Normally runs mwf at [facility] unit  Have discussed with dialysis nurse orders given for run tomorrow

## 2021-08-10 NOTE — ED NOTES
Patient had an episode of incontinence while vomiting. Patient cleaned and linens changed.  Patient's underwear thrown away per patient request. No

## 2021-08-10 NOTE — PROGRESS NOTES
Comprehensive Nutrition Assessment    Type and Reason for Visit: Initial    Nutrition Recommendations/Plan: continue cardiac diet restriction if hyperkalemia becomes an issue then recommend 2G K+ restriction but currently due to N/V/D pt had hypokalemia episode  Provide Nepro daily     Nutrition Assessment:  60 yo female PMH: anemia, asthma, CKD with HD, CMV, HTN, GERD, high cholestorol   Pt ESRD with HD could not finish recent HD due to diarrhea and fatigue. Pt recently dx with diverticulitis and started on antibiotics long hx of GI issues and follows GI outpatient basis. Unsure if diarrhea due to antibiotics recently so currently C-diff rule out as well since pt has hx of C-diff episodes. Recent Results (from the past 24 hour(s))   CBC WITH AUTOMATED DIFF    Collection Time: 08/09/21  6:45 PM   Result Value Ref Range    WBC 5.2 4.6 - 13.2 K/uL    RBC 2.83 (L) 4.20 - 5.30 M/uL    HGB 10.0 (L) 12.0 - 16.0 g/dL    HCT 31.2 (L) 35.0 - 45.0 %    .2 (H) 74.0 - 97.0 FL    MCH 35.3 (H) 24.0 - 34.0 PG    MCHC 32.1 31.0 - 37.0 g/dL    RDW 13.9 11.6 - 14.5 %    PLATELET 472 551 - 811 K/uL    MPV 8.8 (L) 9.2 - 11.8 FL    NEUTROPHILS 52 40 - 73 %    LYMPHOCYTES 34 21 - 52 %    MONOCYTES 12 (H) 3 - 10 %    EOSINOPHILS 1 0 - 5 %    BASOPHILS 1 0 - 2 %    IMMATURE GRANULOCYTES 0 %    ABS. NEUTROPHILS 2.6 1.8 - 8.0 K/UL    ABS. LYMPHOCYTES 1.8 0.9 - 3.6 K/UL    ABS. MONOCYTES 0.6 0.05 - 1.2 K/UL    ABS. EOSINOPHILS 0.1 0.0 - 0.4 K/UL    ABS. BASOPHILS 0.1 0.0 - 0.1 K/UL    ABS. IMM.  GRANS. 0.0 K/UL    PLATELET COMMENTS Adequate      RBC COMMENTS Macrocytosis  2+        RBC COMMENTS Polychromasia  SLIGHT       METABOLIC PANEL, COMPREHENSIVE    Collection Time: 08/09/21  6:45 PM   Result Value Ref Range    Sodium 134 (L) 135 - 145 mmol/L    Potassium 3.5 3.2 - 5.1 mmol/L    Chloride 96 94 - 111 mmol/L    CO2 25 21 - 33 mmol/L    Anion gap 13 mmol/L    Glucose 67 (L) 70 - 110 mg/dL    BUN 23 (H) 9 - 21 mg/dL    Creatinine 8.80 (H) 0.70 - 1.20 mg/dL    BUN/Creatinine ratio 3      GFR est AA 6 ml/min/1.73m2    GFR est non-AA 5 ml/min/1.73m2    Calcium 8.6 8.5 - 10.5 mg/dL    Bilirubin, total 0.9 0.2 - 1.0 mg/dL    AST (SGOT) 15 14 - 74 U/L    ALT (SGPT) 8 3 - 52 U/L    Alk. phosphatase 76 38 - 126 U/L    Protein, total 7.1 6.1 - 8.4 g/dL    Albumin 3.5 3.5 - 4.7 g/dL    Globulin 3.6 g/dL    A-G Ratio 1.0     LIPASE    Collection Time: 08/09/21  6:45 PM   Result Value Ref Range    Lipase 32 10 - 57 U/L   MAGNESIUM    Collection Time: 08/09/21  6:45 PM   Result Value Ref Range    Magnesium 2.1 1.7 - 2.8 mg/dL   TROPONIN I    Collection Time: 08/09/21  6:45 PM   Result Value Ref Range    Troponin-I, Qt. <0.02 (L) 0.02 - 0.05 ng/mL   GLUCOSE, POC    Collection Time: 08/10/21  4:12 AM   Result Value Ref Range    Glucose (POC) 84 70 - 110 mg/dL    Performed by Barber Phillips    CBC WITH AUTOMATED DIFF    Collection Time: 08/10/21  4:50 AM   Result Value Ref Range    WBC 5.0 4.6 - 13.2 K/uL    RBC 2.18 (L) 4.20 - 5.30 M/uL    HGB 7.5 (L) 12.0 - 16.0 g/dL    HCT 24.4 (L) 35.0 - 45.0 %    .9 (H) 74.0 - 97.0 FL    MCH 34.4 (H) 24.0 - 34.0 PG    MCHC 30.7 (L) 31.0 - 37.0 g/dL    RDW 13.7 11.6 - 14.5 %    PLATELET 016 678 - 190 K/uL    MPV 9.1 (L) 9.2 - 11.8 FL    NEUTROPHILS 59 40 - 73 %    LYMPHOCYTES 25 21 - 52 %    MONOCYTES 14 (H) 3 - 10 %    EOSINOPHILS 1 0 - 5 %    BASOPHILS 1 0 - 2 %    IMMATURE GRANULOCYTES 0 %    ABS. NEUTROPHILS 3.0 1.8 - 8.0 K/UL    ABS. LYMPHOCYTES 1.3 0.9 - 3.6 K/UL    ABS. MONOCYTES 0.7 0.05 - 1.2 K/UL    ABS. EOSINOPHILS 0.1 0.0 - 0.4 K/UL    ABS. BASOPHILS 0.0 0.0 - 0.1 K/UL    ABS. IMM.  GRANS. 0.0 K/UL   METABOLIC PANEL, BASIC    Collection Time: 08/10/21  4:50 AM   Result Value Ref Range    Sodium 136 135 - 145 mmol/L    Potassium 4.1 3.2 - 5.1 mmol/L    Chloride 99 94 - 111 mmol/L    CO2 25 21 - 33 mmol/L    Anion gap 12 mmol/L    Glucose 93 70 - 110 mg/dL    BUN 22 (H) 9 - 21 mg/dL    Creatinine 9.20 (H) 0.70 - 1.20 mg/dL    BUN/Creatinine ratio 2      GFR est AA 5 ml/min/1.73m2    GFR est non-AA 4 ml/min/1.73m2    Calcium 8.1 (L) 8.5 - 10.5 mg/dL   TYPE & SCREEN    Collection Time: 08/10/21 10:30 AM   Result Value Ref Range    Crossmatch Expiration 08/13/2021,2359     ABO/Rh(D) AB Positive     Antibody screen Negative        Malnutrition Assessment:  Malnutrition Status:  Insufficient data    Context:  Acute illness     Findings of the 6 clinical characteristics of malnutrition:   Energy Intake:  Mild decrease in energy intake (specify) (N/V/D)  Weight Loss:  Unable to assess (HD pt has fluid fluctation and most recent HD treatment cut short due to symptoms)     Body Fat Loss:  Unable to assess,     Muscle Mass Loss:  Unable to assess,    Fluid Accumulation:  Unable to assess,     Strength:            Estimated Daily Nutrient Needs:  Energy (kcal): 0242-6492 kcal/day; Weight Used for Energy Requirements: Admission (66 kg)  Protein (g): 66-80 g/day; Weight Used for Protein Requirements: Admission (1-1.2 g/kg)  Fluid (ml/day): 1000 mL plus urine output/day; Method Used for Fluid Requirements: Standard renal (1000 mL plus urine output)      Nutrition Related Findings:  Pt ESRD with HD could not finish recent HD due to diarrhea and fatigue. Pt recently dx with diverticulitis and started on antibiotics long hx of GI issues and follows GI outpatient basis. Currently C-diff rule out. Wounds:    None       Current Nutrition Therapies:  ADULT DIET Regular; 3 carb choices (45 gm/meal)    Anthropometric Measures:  · Height:  5' 1\" (154.9 cm)  · Current Body Wt:  66.2 kg (146 lb)   · Admission Body Wt:  146 lb    · Usual Body Wt:        · Ideal Body Wt:  105 lbs:  139 %   · Adjusted Body Weight:   ; Weight Adjustment for: No adjustment   · Adjusted BMI:       · BMI Category: Overweight (BMI 25.0-29. 9)       Nutrition Diagnosis:   · Altered nutrition-related lab values related to altered GI function as evidenced by nausea, vomiting, diarrhea      Nutrition Interventions:   Food and/or Nutrient Delivery: Continue current diet, Start oral nutrition supplement  Nutrition Education and Counseling: Education not appropriate (C-diff pending.)  Coordination of Nutrition Care: Continue to monitor while inpatient    Goals:  K+ 3.5-5.1, Pt to eat > 75% of meals, BM q 1-3 days, improvement in diarhea, Ca 8.5-10.0, Phos 3.0-5.5       Nutrition Monitoring and Evaluation:   Behavioral-Environmental Outcomes:    Food/Nutrient Intake Outcomes: Food and nutrient intake, Supplement intake  Physical Signs/Symptoms Outcomes: Biochemical data, Meal time behavior, Weight, Nausea/vomiting, Diarrhea, GI status     F/U: 8/13/2021    Discharge Planning:     Too soon to determine     Electronically signed by Andrew Norman on 8/10/2021 at 3:37 PM    Contact: ANTHONY [phone]

## 2021-08-10 NOTE — ASSESSMENT & PLAN NOTE
-Chronic abdominal pain  -Diverticulitis acute versus chronic  -Levaquin and Flagyl renally adjusted  -Continue GI medications PPI, Bentyl and Carafate  -Will need GI follow-up outpatient  -C. difficile panel

## 2021-08-10 NOTE — PROGRESS NOTES
Scheduled medication as well as prn zofran and percocet given for nausea and 8/10 abdominal pain. IVF started per orders. Assisted pt to [facility] and back to bed, pt was able to void. Blood sugar rechecked (67 in ED) currently 84. No further needs voiced a this time.  CBWR

## 2021-08-11 LAB
ANION GAP SERPL CALC-SCNC: 7 MMOL/L
BASOPHILS # BLD: 0 K/UL (ref 0–0.1)
BASOPHILS NFR BLD: 1 % (ref 0–2)
BUN SERPL-MCNC: 27 MG/DL (ref 9–21)
BUN/CREAT SERPL: 3
CA-I BLD-MCNC: 7.8 MG/DL (ref 8.5–10.5)
CHLORIDE SERPL-SCNC: 105 MMOL/L (ref 94–111)
CO2 SERPL-SCNC: 23 MMOL/L (ref 21–33)
CREAT SERPL-MCNC: 9.5 MG/DL (ref 0.7–1.2)
DIFFERENTIAL METHOD BLD: ABNORMAL
EOSINOPHIL # BLD: 0 K/UL (ref 0–0.4)
EOSINOPHIL NFR BLD: 1 % (ref 0–5)
ERYTHROCYTE [DISTWIDTH] IN BLOOD BY AUTOMATED COUNT: 13.6 % (ref 11.6–14.5)
GLUCOSE SERPL-MCNC: 99 MG/DL (ref 70–110)
HCT VFR BLD AUTO: 19.7 % (ref 35–45)
HGB BLD-MCNC: 6 G/DL (ref 12–16)
IMM GRANULOCYTES # BLD AUTO: 0 K/UL
IMM GRANULOCYTES NFR BLD AUTO: 0 %
INR PPP: 2 (ref 0.8–1.2)
LYMPHOCYTES # BLD: 1.5 K/UL (ref 0.9–3.6)
LYMPHOCYTES NFR BLD: 31 % (ref 21–52)
MCH RBC QN AUTO: 34.3 PG (ref 24–34)
MCHC RBC AUTO-ENTMCNC: 30.5 G/DL (ref 31–37)
MCV RBC AUTO: 112.6 FL (ref 74–97)
MONOCYTES # BLD: 0.5 K/UL (ref 0.05–1.2)
MONOCYTES NFR BLD: 10 % (ref 3–10)
NEUTS SEG # BLD: 2.8 K/UL (ref 1.8–8)
NEUTS SEG NFR BLD: 57 % (ref 40–73)
PLATELET # BLD AUTO: 238 K/UL (ref 135–420)
PMV BLD AUTO: 8.9 FL (ref 9.2–11.8)
POTASSIUM SERPL-SCNC: 4.1 MMOL/L (ref 3.2–5.1)
PROTHROMBIN TIME: 21.8 SEC (ref 11.5–15.2)
RBC # BLD AUTO: 1.75 M/UL (ref 4.2–5.3)
RBC MORPH BLD: ABNORMAL
RBC MORPH BLD: ABNORMAL
SODIUM SERPL-SCNC: 135 MMOL/L (ref 135–145)
WBC # BLD AUTO: 4.8 K/UL (ref 4.6–13.2)

## 2021-08-11 PROCEDURE — 74011250636 HC RX REV CODE- 250/636: Performed by: PHYSICIAN ASSISTANT

## 2021-08-11 PROCEDURE — 99218 HC RM OBSERVATION: CPT

## 2021-08-11 PROCEDURE — 74011250637 HC RX REV CODE- 250/637: Performed by: INTERNAL MEDICINE

## 2021-08-11 PROCEDURE — 36430 TRANSFUSION BLD/BLD COMPNT: CPT

## 2021-08-11 PROCEDURE — 36415 COLL VENOUS BLD VENIPUNCTURE: CPT

## 2021-08-11 PROCEDURE — 65270000029 HC RM PRIVATE

## 2021-08-11 PROCEDURE — 85025 COMPLETE CBC W/AUTO DIFF WBC: CPT

## 2021-08-11 PROCEDURE — 74011000258 HC RX REV CODE- 258: Performed by: PHYSICIAN ASSISTANT

## 2021-08-11 PROCEDURE — 30233N1 TRANSFUSION OF NONAUTOLOGOUS RED BLOOD CELLS INTO PERIPHERAL VEIN, PERCUTANEOUS APPROACH: ICD-10-PCS | Performed by: INTERNAL MEDICINE

## 2021-08-11 PROCEDURE — 94761 N-INVAS EAR/PLS OXIMETRY MLT: CPT

## 2021-08-11 PROCEDURE — P9016 RBC LEUKOCYTES REDUCED: HCPCS

## 2021-08-11 PROCEDURE — 97165 OT EVAL LOW COMPLEX 30 MIN: CPT

## 2021-08-11 PROCEDURE — 94640 AIRWAY INHALATION TREATMENT: CPT

## 2021-08-11 PROCEDURE — 85610 PROTHROMBIN TIME: CPT

## 2021-08-11 PROCEDURE — 74011250637 HC RX REV CODE- 250/637: Performed by: PHYSICIAN ASSISTANT

## 2021-08-11 PROCEDURE — 80048 BASIC METABOLIC PNL TOTAL CA: CPT

## 2021-08-11 RX ORDER — SODIUM CHLORIDE 9 MG/ML
250 INJECTION, SOLUTION INTRAVENOUS AS NEEDED
Status: DISCONTINUED | OUTPATIENT
Start: 2021-08-11 | End: 2021-08-13 | Stop reason: HOSPADM

## 2021-08-11 RX ADMIN — DEXTROSE AND SODIUM CHLORIDE 100 ML/HR: 5; 900 INJECTION, SOLUTION INTRAVENOUS at 00:44

## 2021-08-11 RX ADMIN — OXYCODONE HYDROCHLORIDE AND ACETAMINOPHEN 1 TABLET: 5; 325 TABLET ORAL at 00:38

## 2021-08-11 RX ADMIN — SUCRALFATE 1 G: 1 TABLET ORAL at 21:12

## 2021-08-11 RX ADMIN — Medication 10 ML: at 06:03

## 2021-08-11 RX ADMIN — SEVELAMER CARBONATE 800 MG: 800 TABLET, FILM COATED ORAL at 17:48

## 2021-08-11 RX ADMIN — DICYCLOMINE HYDROCHLORIDE 10 MG: 10 CAPSULE ORAL at 21:12

## 2021-08-11 RX ADMIN — FLUTICASONE PROPIONATE 1 SPRAY: 50 SPRAY, METERED NASAL at 08:34

## 2021-08-11 RX ADMIN — BUDESONIDE AND FORMOTEROL FUMARATE DIHYDRATE 1 PUFF: 160; 4.5 AEROSOL RESPIRATORY (INHALATION) at 20:36

## 2021-08-11 RX ADMIN — SUCRALFATE 1 G: 1 TABLET ORAL at 17:48

## 2021-08-11 RX ADMIN — ATORVASTATIN CALCIUM 10 MG: 10 TABLET, FILM COATED ORAL at 21:12

## 2021-08-11 RX ADMIN — DICYCLOMINE HYDROCHLORIDE 10 MG: 10 CAPSULE ORAL at 17:48

## 2021-08-11 RX ADMIN — BUDESONIDE AND FORMOTEROL FUMARATE DIHYDRATE 1 PUFF: 160; 4.5 AEROSOL RESPIRATORY (INHALATION) at 08:03

## 2021-08-11 RX ADMIN — Medication 10 ML: at 15:04

## 2021-08-11 RX ADMIN — Medication 10 ML: at 21:12

## 2021-08-11 RX ADMIN — METRONIDAZOLE 500 MG: 250 TABLET ORAL at 21:12

## 2021-08-11 RX ADMIN — ONDANSETRON 4 MG: 2 INJECTION INTRAMUSCULAR; INTRAVENOUS at 09:09

## 2021-08-11 RX ADMIN — LEVOTHYROXINE SODIUM 50 MCG: 0.05 TABLET ORAL at 06:03

## 2021-08-11 RX ADMIN — CARVEDILOL 6.25 MG: 6.25 TABLET, FILM COATED ORAL at 17:48

## 2021-08-11 NOTE — PROGRESS NOTES
1900 - Assumed care of pt, bedside shift report given. 2015 - Assessment completed. 2100 medication given. Pt requesting Zofran, explained to pt that is not time and this nurse will bring it when it is time, pt verbalized understanding. 2022 - Spoke to  Hospitalist about excessive bleeding from CVL insertion site and multiple dressing changes since admission, per hospitalist OK to hold heparin tonight. 2120 - HS medication as well as prn zofran given, pt tolerated well. Pt states stomach pain is improving. No further needs voiced at this time. CBWR     0046 - PRN percocet given for 9/10 abdominal pain. New bag IVF hung per orders. Pt up to bathroom and back to bed. CBWR    0230 - Pt resting comfortably in bed, appears to be asleep. NAD noted. Will continue to monitor. CBWR     9515 - Morning synthroid given. Spoke to hospitalist who is aware of critical lab H&H 6.0/19.7, hold heparin this morning. Pt up to bathroom and back to bed. All ports on CVL flushed, locked and capped. Fresh ice water at bedside.  CBWR     Pt  Has not had a stool this shift, therefore unable to obtain specimen for c - diff test

## 2021-08-11 NOTE — PROGRESS NOTES
0700-Report received from off going nurse. Assumed care of patient. 0720-Assessment complete. 0856-Order to remove Contact Precautions per MD.      0902-Contact dialysis via Dialysis messenger. They stated they would inform me with a nurse is coming to this area. Supervisor notified. 0909-PRN IV Zofran given for nausea. Patient tolerated well. 1004-Dressing changed to Triple lumen. Dressing clean, dry, and intact. No bleeding noted. 1230-Set patient up for lunch. No other needs at this time. 1254-No update from dialysis. Supervisor notified. 1500-Patient sitting up in bed watching tv. NAD. CBWR.     1749-Scheduled medications given. Patient tolerated well. 1757-Patient sitting up in bed watching tv. NAD. Dressing to triple lumen C/D/I with no bleeding noted. Patient has not had any stools this shift.

## 2021-08-11 NOTE — PROGRESS NOTES
Problem: Pressure Injury - Risk of  Goal: *Prevention of pressure injury  Description: Document Quinn Scale and appropriate interventions in the flowsheet.   Outcome: Progressing Towards Goal  Note: Pressure Injury Interventions:  Sensory Interventions: Assess changes in LOC    Moisture Interventions: Absorbent underpads    Activity Interventions: PT/OT evaluation    Mobility Interventions: Float heels, HOB 30 degrees or less    Nutrition Interventions: Document food/fluid/supplement intake

## 2021-08-11 NOTE — PROGRESS NOTES
OCCUPATIONAL THERAPY EVALUATION/DISCHARGE    Patient: Myranda Doss (66 y.o. female)  Date: 8/11/2021  Primary Diagnosis: Intractable vomiting [R11.10]  Anemia [D64.9]        Precautions: dialysis    PLOF: lives at home and had been I with all ADLs and mobility     ASSESSMENT AND RECOMMENDATIONS:  Based on the objective data described below, the patient presents with declines in ADLs and mobility . Skilled occupational therapy is not indicated at this time. Discharge Recommendations: None  Further Equipment Recommendations for Discharge: N/A      SUBJECTIVE:   Patient stated im ready to get out of here .     OBJECTIVE DATA SUMMARY:     Past Medical History:   Diagnosis Date    Anemia NEC     Arrhythmia     Asthma     Asthma     Burning with urination     frequent uti    Chronic kidney disease     dialysis    CMV (cytomegalovirus) antibody positive     Dialysis patient ([facility] [address]     M/W/F    Dyspepsia and other specified disorders of function of stomach     stomach ulcer    Essential hypertension     GERD (gastroesophageal reflux disease)     Hypercholesteremia     Hypertension     Migraine     Obesity     Renal cyst 2002    kidney transplant     Ulcer      Past Surgical History:   Procedure Laterality Date    COLONOSCOPY      Dr Lily Sanchez  5yrs ago    ENDOSCOPY VISIT-OUTPATIENT      Dr Lily Sanchez  5 yrs ago    ENDOSCOPY, COLON, DIAGNOSTIC      with Dr. Osmany Acosta CHOLECYSTECTOMY  02/2021    HX GI      ulcer    HX HEENT      sinus surg    HX RENAL TRANSPLANT      HX TRANSPLANT      kidney transplant 2002    VASCULAR SURGERY PROCEDURE UNLIST      shunt in prep for dialysis     Barriers to Learning/Limitations: None  Compensate with: visual, verbal, tactile, kinesthetic cues/model    Home Situation:   Home Situation  Home Environment: Private residence  # Steps to Enter: 3  Rails to Enter: Yes  Hand Rails : Bilateral  One/Two Story Residence: One story  Living Alone: No  Support Systems: Family member(s)  Patient Expects to be Discharged to[de-identified] House  Current DME Used/Available at Home: None  []     Right hand dominant   []     Left hand dominant    Cognitive/Behavioral Status:  Neurologic State: Alert  Orientation Level: Oriented X4  Cognition: Appropriate decision making       Skin: intact  Edema: none noted     Vision/Perceptual:            WFLs                          Coordination: BUE        WFLs        Balance:   intact    Strength: BUE  3+/5 B UE strength                  Tone & Sensation: BUE  Intact                           Range of Motion: BUE  Full                             Functional Mobility and Transfers for ADLs:  Bed Mobility:   Mod I            Transfers: Mod I                                  ADL Assessment: Mod I for basic ADLs and mobility               Pain:  Pain level pre-treatment: 0/10   Pain level post-treatment: 0/10   Pain Intervention(s): Medication (see MAR); Rest, Ice, Repositioning  Response to intervention: Nurse notified, See doc flow  Activity Tolerance:   Good     Please refer to the flowsheet for vital signs taken during this treatment. After treatment:   [x]  Patient left in no apparent distress sitting up in chair  []  Patient left in no apparent distress in bed  [x]  Call bell left within reach  [x]  Nursing notified  []  Caregiver present  []  Bed alarm activated    COMMUNICATION/EDUCATION:   [x]      Role of Occupational Therapy in the acute care setting  [x]      Home safety education was provided and the patient/caregiver indicated understanding. []      Patient/family have participated as able and agree with findings and recommendations. []      Patient is unable to participate in plan of care at this time. Thank you for this referral.  Renetta Diaz MS, OTR/L          Nayaal Complexity: History: MEDIUM Complexity : Expanded review of history including physical, cognitive and psychosocial  history ;    Examination: MEDIUM Complexity : 3-5 performance deficits relating to physical, cognitive , or psychosocial skils that result in activity limitations and / or participation restrictions; Decision Making:MEDIUM Complexity : Patient may present with comorbidities that affect occupational performnce.  Miniml to moderate modification of tasks or assistance (eg, physical or verbal ) with assesment(s) is necessary to enable patient to complete evaluation

## 2021-08-11 NOTE — PROGRESS NOTES
Bedside shift change report given to [facility] and [address] (oncoming nurse) by Edmond Norman (offgoing nurse). Report included the following information SBAR, Intake/Output, MAR, Recent Results and Cardiac Rhythm NSR.     1930-assessed pt, no voiced concerns at this time, CBWR.

## 2021-08-11 NOTE — PROGRESS NOTES
Hospitalist Progress Note             Date of Service:    NAME:  Jaimie Marinelli  :  1955  MRN:  [id number]    Assessment & Plan:         Intractable vomiting  -Continue with Phenergan Zofran  -dc ivf due to esrd,  - all symptoms much improved     Gastritis and duodenitis  -cont levo and flagyl for previously diagnosed Acute Diverticultis on recnet admission     Essential hypertension  -controlled     Anemia, macrocytic  -Etiology likely due to chronic disease  -Patient had previously received Epogen injections  -Type and screen 2 units PRBCs  - b12 and folate checked , wnl  - pt accepts transfusioin, 1 unit due to hgb 6.0, with symtpoms of extreme lethargy  - check occult stool  - suspect some component is due to bleeding from Right femoral catheter that was placed in ed, nursing reports intermiteent bleeding yesteday     ESRD (end stage renal disease) on dialysis [facility])  -Nephrology consultation  - hemodialysis today, prefer transfusion at hd    Hx dvt and afib  - resume coumadin when confirm no active bleeding  - inr currently 2.0        Hospital Problems  Date Reviewed: 2021        Codes Class Noted POA    * (Principal) Intractable vomiting ICD-10-CM: R11.10  ICD-9-CM: 536.2  2021 Unknown        ESRD (end stage renal disease) on dialysis [facility]) ICD-10-CM: N18.6, Z99.2  ICD-9-CM: 585.6, V45.11  2020 Yes        Gastritis and duodenitis ICD-10-CM: K29.90  ICD-9-CM: 535.50  2015 Yes        Anemia ICD-10-CM: D64.9  ICD-9-CM: 285.9  11/10/2014 Yes        Essential hypertension ICD-10-CM: I10  ICD-9-CM: 401.9  2013 Yes                Review of Systems:   A comprehensive review of systems was negative except for that written in the HPI. Vital Signs:    Last 24hrs VS reviewed since prior progress note.  Most recent are:  Visit Vitals  BP (!) 134/52   Pulse 77   Temp 98.3 °F (36.8 °C) Resp 18   Ht 5' 1\" (1.549 m)   Wt 67 kg (147 lb 12.8 oz)   SpO2 99%   BMI 27.93 kg/m²         Intake/Output Summary (Last 24 hours) at 8/11/2021 1014  Last data filed at 8/11/2021 0610  Gross per 24 hour   Intake 2865 ml   Output    Net 2865 ml        Physical Examination:       General:          Alert, cooperative, no distress, appears stated age. + pale  HEENT:           Atraumatic, anicteric sclerae, pink conjunctivae                          No oral ulcers, mucosa moist, throat clear, dentition fair  Neck:               Supple, symmetrical  Lungs:             Clear to auscultation bilaterally. No Wheezing or Rhonchi. No rales. Chest wall:      No tenderness  No Accessory muscle use. Heart:              Regular  rhythm,  No  murmur   No edema  Abdomen:        Soft, non-tender. Not distended. Bowel sounds normal  Extremities:     No cyanosis. No clubbing,                            Skin turgor normal, Capillary refill normal  Skin:                Not pale. Not Jaundiced  No rashes   Psych:             Not anxious or agitated. Neurologic:      Alert, moves all extremities, answers questions appropriately and responds to commands        Data Review:    Review and/or order of clinical lab test  Review and/or order of tests in the radiology section of CPT  Review and/or order of tests in the medicine section of CPT      Labs:     Recent Labs     08/11/21  0515 08/10/21  0450   WBC 4.8 5.0   HGB 6.0* 7.5*   HCT 19.7* 24.4*    308     Recent Labs     08/11/21  0515 08/10/21  0450 08/09/21  1845    136 134*   K 4.1 4.1 3.5    99 96   CO2 23 25 25   BUN 27* 22* 23*   CREA 9.50* 9.20* 8.80*   GLU 99 93 67*   CA 7.8* 8.1* 8.6   MG  --   --  2.1     Recent Labs     08/09/21  1845   ALT 8   AP 76   TBILI 0.9   TP 7.1   ALB 3.5   GLOB 3.6   LPSE 32     Recent Labs     08/11/21  0850   INR 2.0*   PTP 21.8*      No results for input(s): FE, TIBC, PSAT, FERR in the last 72 hours.    Lab Results Component Value Date/Time    Folate 23.4 (H) 06/13/2021 10:45 AM      No results for input(s): PH, PCO2, PO2 in the last 72 hours.   Recent Labs     08/09/21  1845   TROIQ <0.02*     No results found for: CHOL, CHOLX, CHLST, CHOLV, HDL, HDLP, LDL, LDLC, DLDLP, TGLX, TRIGL, TRIGP, CHHD, CHHDX  Lab Results   Component Value Date/Time    Glucose (POC) 84 08/10/2021 04:12 AM     Lab Results   Component Value Date/Time    Color Yellow/Straw 06/12/2021 06:45 AM    Appearance Clear 06/12/2021 06:45 AM    Specific gravity 1.010 06/12/2021 06:45 AM    pH (UA) 7.0 06/12/2021 06:45 AM    Protein 100 (A) 06/12/2021 06:45 AM    Glucose Negative 06/12/2021 06:45 AM    Ketone Negative 06/12/2021 06:45 AM    Bilirubin Negative 06/12/2021 06:45 AM    Urobilinogen 0.2 06/12/2021 06:45 AM    Nitrites Negative 06/12/2021 06:45 AM    Leukocyte Esterase Negative 06/12/2021 06:45 AM    Epithelial cells Few 06/12/2021 06:45 AM    Bacteria 2+ (A) 06/12/2021 06:45 AM    WBC 0-4 06/12/2021 06:45 AM    RBC 0-5 06/12/2021 06:45 AM         Medications Reviewed:     Current Facility-Administered Medications   Medication Dose Route Frequency    0.9% sodium chloride infusion 250 mL  250 mL IntraVENous PRN    alum-mag hydroxide-simeth (MYLANTA) oral suspension 10 mL  10 mL Oral Q6H PRN    amiodarone (CORDARONE) tablet 200 mg  200 mg Oral DAILY    calcitRIOL (ROCALTROL) capsule 0.25 mcg  0.25 mcg Oral DAILY    carvediloL (COREG) tablet 6.25 mg  6.25 mg Oral BID WITH MEALS    dicyclomine (BENTYL) capsule 10 mg  10 mg Oral QID    [START ON 8/12/2021] estradioL (ESTRACE) 0.01 % (0.1 mg/gram) vaginal cream 2 g (Patient Supplied)  2 g Vaginal EVERY MON & TH    fluticasone propionate (FLONASE) 50 mcg/actuation nasal spray 1 Spray  1 Spray Both Nostrils DAILY    hyoscyamine sulfate (CYSTOSPAZ) rapid dissolve tablet 0.125 mg  0.125 mg SubLINGual Q4H PRN    levothyroxine (SYNTHROID) tablet 50 mcg  50 mcg Oral 6am    meclizine (ANTIVERT) tablet 25 mg  25 mg Oral Q6H PRN    montelukast (SINGULAIR) tablet 10 mg  10 mg Oral DAILY    pantoprazole (PROTONIX) tablet 40 mg  40 mg Oral DAILY    predniSONE (DELTASONE) tablet 5 mg  5 mg Oral DAILY WITH BREAKFAST    B complex-vitamin C-folic acid (NEPHRO-RACHID) 0.8 mg tab  1 Tablet Oral DAILY    sertraline (ZOLOFT) tablet 50 mg  50 mg Oral DAILY    atorvastatin (LIPITOR) tablet 10 mg  10 mg Oral QHS    sucralfate (CARAFATE) tablet 1 g  1 g Oral AC&HS    valGANciclovir (VALCYTE) tablet 900 mg (Patient Supplied)  900 mg Oral DAILY    budesonide-formoteroL (SYMBICORT) 160-4.5 mcg/actuation HFA inhaler 1 Puff  1 Puff Inhalation BID RT    zolpidem (AMBIEN) tablet 5 mg  5 mg Oral QHS PRN    sodium chloride (NS) flush 5-40 mL  5-40 mL IntraVENous Q8H    sodium chloride (NS) flush 5-40 mL  5-40 mL IntraVENous PRN    acetaminophen (TYLENOL) tablet 650 mg  650 mg Oral Q6H PRN    Or    acetaminophen (TYLENOL) suppository 650 mg  650 mg Rectal Q6H PRN    polyethylene glycol (MIRALAX) packet 17 g  17 g Oral DAILY PRN    promethazine (PHENERGAN) tablet 12.5 mg  12.5 mg Oral Q6H PRN    Or    ondansetron (ZOFRAN) injection 4 mg  4 mg IntraVENous Q6H PRN    heparin (porcine) injection 5,000 Units  5,000 Units SubCUTAneous Q8H    oxyCODONE-acetaminophen (PERCOCET) 5-325 mg per tablet 1 Tablet  1 Tablet Oral Q6H PRN    0.9% sodium chloride infusion 250 mL  250 mL IntraVENous PRN    albuterol (PROVENTIL VENTOLIN) nebulizer solution 2.5 mg  2.5 mg Nebulization Q2H PRN    hydrALAZINE (APRESOLINE) 20 mg/mL injection 10 mg  10 mg IntraVENous Q6H PRN    levoFLOXacin (LEVAQUIN) 250 mg in D5W IVPB  250 mg IntraVENous Q48H    metroNIDAZOLE (FLAGYL) tablet 500 mg  500 mg Oral Q12H    sevelamer carbonate (RENVELA) tab 800 mg  800 mg Oral TID WITH MEALS     ______________________________________________________________________  EXPECTED LENGTH OF STAY: - - -  ACTUAL LENGTH OF STAY:          0                 Parviz Spears MD

## 2021-08-11 NOTE — PROGRESS NOTES
Spoke with Dr Renée Aldridge, on call nephro for Dr Alex Pina to inform him that no dialysis nurse had showed up today as planned for pt's hd. States it has been hard to get HD nurses down to [place]. States to resume pt's bp meds which had been held today, states he will try to call and see if any hd nurse will present here tonight, although not likely,  otherwise will plan for hd in a.m.

## 2021-08-12 LAB
ABO + RH BLD: NORMAL
ANION GAP SERPL CALC-SCNC: 10 MMOL/L
BASOPHILS # BLD: 0.1 K/UL (ref 0–0.1)
BASOPHILS NFR BLD: 1 % (ref 0–2)
BLD PROD TYP BPU: NORMAL
BLOOD GROUP ANTIBODIES SERPL: NEGATIVE
BPU ID: NORMAL
BUN SERPL-MCNC: 29 MG/DL (ref 9–21)
BUN/CREAT SERPL: 3
CA-I BLD-MCNC: 8.7 MG/DL (ref 8.5–10.5)
CHLORIDE SERPL-SCNC: 107 MMOL/L (ref 94–111)
CO2 SERPL-SCNC: 22 MMOL/L (ref 21–33)
CREAT SERPL-MCNC: 10.4 MG/DL (ref 0.7–1.2)
CROSSMATCH RESULT,%XM: NORMAL
EOSINOPHIL # BLD: 0.1 K/UL (ref 0–0.4)
EOSINOPHIL NFR BLD: 2 % (ref 0–5)
ERYTHROCYTE [DISTWIDTH] IN BLOOD BY AUTOMATED COUNT: 19.1 % (ref 11.6–14.5)
GLUCOSE SERPL-MCNC: 77 MG/DL (ref 70–110)
HCT VFR BLD AUTO: 26.1 % (ref 35–45)
HGB BLD-MCNC: 8.1 G/DL (ref 12–16)
IMM GRANULOCYTES # BLD AUTO: 0 K/UL
IMM GRANULOCYTES NFR BLD AUTO: 0 %
INR PPP: 1.5 (ref 0.8–1.2)
LYMPHOCYTES # BLD: 1.6 K/UL (ref 0.9–3.6)
LYMPHOCYTES NFR BLD: 27 % (ref 21–52)
MAGNESIUM SERPL-MCNC: 2 MG/DL (ref 1.7–2.8)
MCH RBC QN AUTO: 32.9 PG (ref 24–34)
MCHC RBC AUTO-ENTMCNC: 31 G/DL (ref 31–37)
MCV RBC AUTO: 106.1 FL (ref 74–97)
MONOCYTES # BLD: 0.5 K/UL (ref 0.05–1.2)
MONOCYTES NFR BLD: 9 % (ref 3–10)
NEUTS SEG # BLD: 3.7 K/UL (ref 1.8–8)
NEUTS SEG NFR BLD: 61 % (ref 40–73)
PHOSPHATE SERPL-MCNC: 5.3 MG/DL (ref 2.5–4.5)
PLATELET # BLD AUTO: 261 K/UL (ref 135–420)
PMV BLD AUTO: 8.8 FL (ref 9.2–11.8)
POTASSIUM SERPL-SCNC: 4.6 MMOL/L (ref 3.2–5.1)
PROTHROMBIN TIME: 17.1 SEC (ref 11.5–15.2)
RBC # BLD AUTO: 2.46 M/UL (ref 4.2–5.3)
SODIUM SERPL-SCNC: 139 MMOL/L (ref 135–145)
SPECIMEN EXP DATE BLD: NORMAL
STATUS OF UNIT,%ST: NORMAL
TRANSFUSION STATUS PATIENT QL: NORMAL
UNIT DIVISION, %UDIV: 0
WBC # BLD AUTO: 6 K/UL (ref 4.6–13.2)

## 2021-08-12 PROCEDURE — 74011250636 HC RX REV CODE- 250/636: Performed by: INTERNAL MEDICINE

## 2021-08-12 PROCEDURE — 74011250637 HC RX REV CODE- 250/637: Performed by: PHYSICIAN ASSISTANT

## 2021-08-12 PROCEDURE — 85025 COMPLETE CBC W/AUTO DIFF WBC: CPT

## 2021-08-12 PROCEDURE — 85610 PROTHROMBIN TIME: CPT

## 2021-08-12 PROCEDURE — 94640 AIRWAY INHALATION TREATMENT: CPT

## 2021-08-12 PROCEDURE — 74011250637 HC RX REV CODE- 250/637: Performed by: INTERNAL MEDICINE

## 2021-08-12 PROCEDURE — 90935 HEMODIALYSIS ONE EVALUATION: CPT

## 2021-08-12 PROCEDURE — 94761 N-INVAS EAR/PLS OXIMETRY MLT: CPT

## 2021-08-12 PROCEDURE — 74011636637 HC RX REV CODE- 636/637: Performed by: PHYSICIAN ASSISTANT

## 2021-08-12 PROCEDURE — 84100 ASSAY OF PHOSPHORUS: CPT

## 2021-08-12 PROCEDURE — 80048 BASIC METABOLIC PNL TOTAL CA: CPT

## 2021-08-12 PROCEDURE — 36415 COLL VENOUS BLD VENIPUNCTURE: CPT

## 2021-08-12 PROCEDURE — 65270000029 HC RM PRIVATE

## 2021-08-12 PROCEDURE — 83735 ASSAY OF MAGNESIUM: CPT

## 2021-08-12 PROCEDURE — 74011250636 HC RX REV CODE- 250/636: Performed by: PHYSICIAN ASSISTANT

## 2021-08-12 PROCEDURE — 5A1D70Z PERFORMANCE OF URINARY FILTRATION, INTERMITTENT, LESS THAN 6 HOURS PER DAY: ICD-10-PCS | Performed by: INTERNAL MEDICINE

## 2021-08-12 RX ORDER — METRONIDAZOLE 500 MG/1
500 TABLET ORAL EVERY 12 HOURS
Qty: 8 TABLET | Refills: 0 | Status: SHIPPED | OUTPATIENT
Start: 2021-08-12 | End: 2021-08-16

## 2021-08-12 RX ORDER — SODIUM CHLORIDE 9 MG/ML
2000 INJECTION, SOLUTION INTRAVENOUS ONCE
Status: COMPLETED | OUTPATIENT
Start: 2021-08-12 | End: 2021-08-12

## 2021-08-12 RX ORDER — AMOXICILLIN AND CLAVULANATE POTASSIUM 500; 125 MG/1; MG/1
1 TABLET, FILM COATED ORAL DAILY
Qty: 4 TABLET | Refills: 0 | Status: SHIPPED
Start: 2021-08-12 | End: 2021-08-16

## 2021-08-12 RX ADMIN — HEPARIN SODIUM 5000 UNITS: 5000 INJECTION INTRAVENOUS; SUBCUTANEOUS at 14:00

## 2021-08-12 RX ADMIN — MAGNESIUM HYDROXIDE/ALUMINUM HYDROXICE/SIMETHICONE 10 ML: 120; 1200; 1200 SUSPENSION ORAL at 12:22

## 2021-08-12 RX ADMIN — Medication 1 TABLET: at 09:00

## 2021-08-12 RX ADMIN — DICYCLOMINE HYDROCHLORIDE 10 MG: 10 CAPSULE ORAL at 11:33

## 2021-08-12 RX ADMIN — LEVOTHYROXINE SODIUM 50 MCG: 0.05 TABLET ORAL at 06:32

## 2021-08-12 RX ADMIN — DICYCLOMINE HYDROCHLORIDE 10 MG: 10 CAPSULE ORAL at 17:03

## 2021-08-12 RX ADMIN — BUDESONIDE AND FORMOTEROL FUMARATE DIHYDRATE 1 PUFF: 160; 4.5 AEROSOL RESPIRATORY (INHALATION) at 07:42

## 2021-08-12 RX ADMIN — MONTELUKAST 10 MG: 10 TABLET, FILM COATED ORAL at 11:33

## 2021-08-12 RX ADMIN — AMIODARONE HYDROCHLORIDE 200 MG: 200 TABLET ORAL at 11:33

## 2021-08-12 RX ADMIN — EPOETIN ALFA-EPBX 10000 UNITS: 10000 INJECTION, SOLUTION INTRAVENOUS; SUBCUTANEOUS at 21:09

## 2021-08-12 RX ADMIN — Medication 10 ML: at 06:38

## 2021-08-12 RX ADMIN — SEVELAMER CARBONATE 800 MG: 800 TABLET, FILM COATED ORAL at 17:03

## 2021-08-12 RX ADMIN — BUDESONIDE AND FORMOTEROL FUMARATE DIHYDRATE 1 PUFF: 160; 4.5 AEROSOL RESPIRATORY (INHALATION) at 23:46

## 2021-08-12 RX ADMIN — LEVOFLOXACIN 250 MG: 5 INJECTION, SOLUTION INTRAVENOUS at 11:34

## 2021-08-12 RX ADMIN — Medication 10 ML: at 17:04

## 2021-08-12 RX ADMIN — CARVEDILOL 6.25 MG: 6.25 TABLET, FILM COATED ORAL at 11:33

## 2021-08-12 RX ADMIN — OXYCODONE HYDROCHLORIDE AND ACETAMINOPHEN 1 TABLET: 5; 325 TABLET ORAL at 17:03

## 2021-08-12 RX ADMIN — HEPARIN SODIUM 5000 UNITS: 5000 INJECTION INTRAVENOUS; SUBCUTANEOUS at 06:31

## 2021-08-12 RX ADMIN — CALCITRIOL CAPSULES 0.25 MCG 0.25 MCG: 0.25 CAPSULE ORAL at 11:32

## 2021-08-12 RX ADMIN — FLUTICASONE PROPIONATE 1 SPRAY: 50 SPRAY, METERED NASAL at 11:54

## 2021-08-12 RX ADMIN — PREDNISONE 5 MG: 5 TABLET ORAL at 11:33

## 2021-08-12 RX ADMIN — SODIUM CHLORIDE 2000 ML: 9 INJECTION, SOLUTION INTRAVENOUS at 21:08

## 2021-08-12 RX ADMIN — METRONIDAZOLE 500 MG: 250 TABLET ORAL at 11:33

## 2021-08-12 RX ADMIN — SUCRALFATE 1 G: 1 TABLET ORAL at 11:33

## 2021-08-12 RX ADMIN — SERTRALINE 50 MG: 50 TABLET, FILM COATED ORAL at 11:33

## 2021-08-12 RX ADMIN — PANTOPRAZOLE SODIUM 40 MG: 40 TABLET, DELAYED RELEASE ORAL at 11:33

## 2021-08-12 RX ADMIN — SUCRALFATE 1 G: 1 TABLET ORAL at 17:03

## 2021-08-12 RX ADMIN — OXYCODONE HYDROCHLORIDE AND ACETAMINOPHEN 1 TABLET: 5; 325 TABLET ORAL at 04:31

## 2021-08-12 RX ADMIN — SUCRALFATE 1 G: 1 TABLET ORAL at 06:34

## 2021-08-12 RX ADMIN — CARVEDILOL 6.25 MG: 6.25 TABLET, FILM COATED ORAL at 17:03

## 2021-08-12 RX ADMIN — PROMETHAZINE HYDROCHLORIDE 12.5 MG: 25 TABLET ORAL at 17:03

## 2021-08-12 RX ADMIN — ONDANSETRON 4 MG: 2 INJECTION INTRAMUSCULAR; INTRAVENOUS at 04:31

## 2021-08-12 RX ADMIN — SEVELAMER CARBONATE 800 MG: 800 TABLET, FILM COATED ORAL at 11:32

## 2021-08-12 NOTE — PROGRESS NOTES
conducted an initial consultation and Spiritual Assessment for [facility], who is a 61 y.o.,female. Patients Primary Language is: [place].    According to the patients EMR [employer]ondoism Affiliation is: [facility].     The reason the Patient came to the hospital is:   Patient Active Problem List    Diagnosis Date Noted    Intractable vomiting 08/09/2021    Left leg pain 07/14/2021    Acute hyperkalemia 06/12/2021    Encounter for cholecystectomy 05/06/2021    Acute left-sided low back pain without sciatica 05/06/2021    Atrial fibrillation ([address] 05/06/2021    Chronic anticoagulation 05/06/2021    Stomatitis 03/02/2021    Thrush of mouth and esophagus ([address] 03/02/2021    ESRD (end stage renal disease) on dialysis ([address] 12/28/2020    History of DVT (deep vein thrombosis) 12/28/2020    Hypothyroidism 12/24/2020    Vertigo 12/24/2020    Calculus of gallbladder with acute cholecystitis without obstruction 10/09/2020    Acute recurrent maxillary sinusitis 08/06/2020    Ulcer     Obesity     Migraine     Hypertension     Hypercholesteremia     GERD (gastroesophageal reflux disease)     Burning with urination     Arrhythmia     Hyperkalemia 11/13/2016    Pruritus 09/29/2016    Chronic kidney disease, stage III (moderate) ([facility]) 09/27/2016    Recurrent urinary tract infection 09/27/2016    Nausea and vomiting 04/10/2016    Diverticulitis of intestine 04/02/2016    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    Gastritis and duodenitis 02/23/2015    Anemia 11/10/2014    Disease due to gram-negative bacillus 10/17/2014    Fever 10/14/2014    Drug-induced hyperglycemia 06/28/2014    Exacerbation of asthma 06/25/2014    Systemic inflammatory response syndrome (SIRS) ([address] 06/23/2014    Kidney transplant rejection 04/10/2014    Acute renal failure ([address] 04/07/2014    Renal transplant disorder 04/07/2014    Systemic infection ([address] 01/07/2014    Asthma 08/21/2013    Chronic obstructive pulmonary disease ([facility] 08/21/2013    Diverticular disease of large intestine 08/21/2013    Essential hypertension 08/21/2013    Seasonal allergic rhinitis due to pollen 08/21/2013    Hyperlipidemia 08/21/2013    UTI (urinary tract infection) 08/21/2013    Fecal incontinence 04/17/2013    History of kidney transplant 04/30/2012    CMV (cytomegalovirus) antibody positive 04/30/2012    Hematuria 04/30/2012    Migraine without status migrainosus, not intractable 02/24/2010    Renal cyst 01/01/2002        The  provided the following Interventions:  Initiated a relationship of care and support. Explored issues of cyrus, spirituality and/or Congregational needs while hospitalized. Listened empathically. Provided chaplaincy education. Provided information about Spiritual Care Services. Offered prayer and assurance of continued prayers on patient's behalf. Chart reviewed. The following outcomes were achieved:  Patient shared some information about their medical narrative and spiritual journey/beliefs. Patient processed feeling about current hospitalization. Patient expressed gratitude for the 's visit. Assessment:  Patient did not indicate any spiritual or Congregational issues which require Spiritual Care Services interventions at this time. Patient does not have any Congregational/cultural needs that will affect patients preferences in health care. Plan:  Chaplains will continue to follow and will provide pastoral care on an as needed or requested basis.  recommends bedside caregivers page  on duty if patient shows signs of acute spiritual or emotional distress. Per patient, no concerns. Awaiting dialysis. Peaceful and calm.  provided prayer of comfort.       [facility]   Staff [facility]   [phone]

## 2021-08-12 NOTE — PROGRESS NOTES
0700  Received care of pt resting in bed with no distress. Call bell within reach  0900  Called and left message with dialysis to determine if pt will have dialysis today. .  AM meds held at this time. 36  Was informed that pt will have dialysis today possibly at shift change. AM meds given. Dressing to R groin central line changed due to old dressing soiled and peeling off.

## 2021-08-12 NOTE — DISCHARGE SUMMARY
Discharge Summary       PATIENT ID: Odell Higgins  MRN: [id number]   YOB: 1955    DATE OF ADMISSION: 8/9/2021  5:46 PM    DATE OF DISCHARGE: 08/12/21    PRIMARY CARE PROVIDER: Veronica Edgar MD     ATTENDING PHYSICIAN: Abraham Waller MD  DISCHARGING PROVIDER: Abraham Waller MD        CONSULTATIONS: IP CONSULT TO NEPHROLOGY    PROCEDURES/SURGERIES: * No surgery found *    ADMITTING [address] [place] [address] COURSE:   Odell Higgins is a 61 y.o. female  has a past medical history of Anemia NEC, Arrhythmia, Asthma, Asthma, Burning with urination, Chronic kidney disease, CMV (cytomegalovirus) antibody positive, Dialysis patient ([facility] Dyspepsia and other specified disorders of function of stomach, Essential hypertension, GERD (gastroesophageal reflux disease), Hypercholesteremia, Hypertension, Migraine, Obesity, Renal cyst (2002), and Ulcer.       Patient presents to emergency department with complaint of fatigue and diarrhea. Patient has had longstanding GI history with similar problem and previous C. difficile and VRE. She does see GI and she is currently on numerous medications for the same. She was seen in our facility a week ago and yesterday at [facility] for today's presenting problem. Yesterday's study showed diverticulitis and she was started on antibiotic.      Patient was unable to complete dialysis yesterday due to the nausea and diarrhea. Her potassium is slightly elevated while her creatinine is 8.8 and likely trending higher.     Patient evaluated in her room and found to be sleeping reported 9/10 pain and nauseous. States that she has been having GI problems for quite a while is a worse now.     Patient will be admitted for intractable nausea vomiting and general supportive care but length of stay 1 midnight.         DISCHARGE DIAGNOSES / PLAN:      Assessment & Plan:         Intractable vomiting  -Continue with Phenergan Zofran  -dc ivf due to esrd,  - all symptoms much improved     Gastritis and duodenitis  -cont levo and flagyl for previously diagnosed Acute Diverticultis on recnet admission     Essential hypertension  -controlled     Anemia, macrocytic  -Etiology likely due to chronic disease  -Patient had previously received Epogen injections  -Type and screen 2 units PRBCs  - b12 and folate checked 6/21, wnl  - pt accepts transfusioin, 1 unit due to hgb 6.0, with symtpoms of extreme lethargy  - check occult stool  - suspect some component is due to bleeding from Right femoral catheter that was placed in ed, nursing reports intermiteent bleeding yesteday     ESRD (end stage renal disease) on dialysis [facility])  -Nephrology consultation  - hemodialysis today, prefer transfusion at hd     Hx dvt and afib  - resume coumadin when confirm no active bleeding  - inr currently 2.0      Day of dc, GI symtpoms impoved, she will finish augemtin started at [place] [facility]. She will get HD prior to DC today. She will resume coumadin, and cont to follow with pcp and gi. Watch h/h. Total dc time 35 mins    FOLLOW UP APPOINTMENTS:    Follow-up Information     Follow up With Specialties Details Why Paige Santos MD Family Medicine  9- @ 1:30PM [address]  [phone]               DIET: renal      DISCHARGE MEDICATIONS:  Current Discharge Medication List      START taking these medications    Details   metroNIDAZOLE (FLAGYL) 500 mg tablet Take 1 Tablet by mouth every twelve (12) hours for 4 days. Qty: 8 Tablet, Refills: 0  Start date: 8/12/2021, End date: 8/16/2021      amoxicillin-clavulanate (Augmentin) 500-125 mg per tablet Take 1 Tablet by mouth daily for 4 days. Finish augmentin you already have at home from prior visit to ED at [facility].   Qty: 4 Tablet, Refills: 0  Start date: 8/12/2021, End date: 8/16/2021         CONTINUE these medications which have NOT CHANGED    Details   calcitRIOL (ROCALTROL) 0.25 mcg capsule Take 1 Capsule by mouth as directed. Take on non dialysis days      doxercalciferol (HECTOROL IV) 14 mcg by IntraVENous route two (2) days a week.      lidocaine-prilocaine (EMLA) topical cream as directed. predniSONE (DELTASONE) 5 mg tablet Take 5 mg by mouth daily. ondansetron (Zofran ODT) 4 mg disintegrating tablet Take 1 Tablet by mouth every eight (8) hours as needed for Nausea. Qty: 14 Tablet, Refills: 0      Wixela Inhub 250-50 mcg/dose diskus inhaler INHALE 1 PUFF BY MOUTH EVERY 12 HOURS  Qty: 60 Each, Refills: 3    Associated Diagnoses: Seasonal allergic rhinitis due to pollen      RenaPlex-D 800 mcg-12.5 mg -2,000 unit tab Take 1 Tablet by mouth daily. sevelamer carbonate (RENVELA) 800 mg tab tab Take 800 mg by mouth three (3) times daily. zolpidem (AMBIEN) 5 mg tablet Take 5 mg by mouth nightly as needed. levothyroxine (SYNTHROID) 50 mcg tablet TAKE 1 TABLET BY MOUTH EVERY DAY  Qty: 90 Tab, Refills: 3      meclizine (ANTIVERT) 25 mg tablet TAKE 1 TABLET BY MOUTH THREE TIMES DAILY FOR UP TO 10 DAYS AS NEEDED FOR DIZZINESS  Qty: 21 Tab, Refills: 0    Associated Diagnoses: Dizziness      dicyclomine (BENTYL) 10 mg capsule TAKE 1 CAPSULE BY MOUTH FOUR TIMES DAILY  Qty: 120 Cap, Refills: 1      sucralfate (CARAFATE) 1 gram tablet TAKE 1 TABLET BY MOUTH FOUR TIMES DAILY  Qty: 360 Tab, Refills: 0      carvediloL (COREG) 6.25 mg tablet Take 1 Tab by mouth two (2) times daily (with meals). Qty: 180 Tab, Refills: 2    Associated Diagnoses: Hypertension, unspecified type      valGANciclovir (VALCYTE) 450 mg tablet Take 2 Tabs by mouth daily. Qty: 90 Tab, Refills: 5    Associated Diagnoses: ESRD (end stage renal disease) on dialysis (HCC)      simvastatin (ZOCOR) 20 mg tablet TAKE 1 TABLET BY MOUTH DAILY AS DIRECTED. Qty: 90 Tab, Refills: 1      sertraline (ZOLOFT) 50 mg tablet Take 1 Tab by mouth daily.   Qty: 90 Tab, Refills: 0      albuterol (PROVENTIL VENTOLIN) 2.5 mg /3 mL (0.083 %) nebu USE 1 VIAL VIA NEBULIZER THREE TIMES DAILY  Qty: 90 mL, Refills: 3      amiodarone (CORDARONE) 200 mg tablet TAKE 1 TABLET BY MOUTH EVERY DAY  Qty: 90 Tab, Refills: 2      Dexilant 60 mg CpDB capsule (delayed release) TAKE ONE CAPSULE BY MOUTH EVERY DAY  Qty: 30 Cap, Refills: 5      aluminum & magnesium hydroxide-simethicone (Maalox Maximum Strength) 400-400-40 mg/5 mL suspension Take 10 mL by mouth every six (6) hours as needed for Indigestion. Qty: 250 mL, Refills: 0      ESTRACE 0.01 % (0.1 mg/gram) vaginal cream INSERT 2 GRAMS INTO VAGINA EVERY MONDAY AND THURSDAY  Qty: 42.5 g, Refills: 5      cranberry extract 425 mg cap 425 mg. LORazepam (ATIVAN) 0.5 mg tablet Take 0.5 mg by mouth daily. fluticasone (FLONASE) 50 mcg/actuation nasal spray 1 Spray.      hyoscyamine SL (LEVSIN/SL) 0.125 mg SL tablet 0.125 mg by SubLINGual route every four (4) hours as needed. montelukast (SINGULAIR) 10 mg tablet Take 10 mg by mouth daily. methoxy peg-epoetin beta (MIRCERA INJECTION) 100 mcg.      warfarin sodium (WARFARIN PO) Take  by mouth. Patient unclear on dose, last recorded fill in June      EPINEPHrine (EPIPEN) 0.3 mg/0.3 mL injection 0.3 mg. STOP taking these medications       pantoprazole (Protonix) 20 mg tablet Comments:   Reason for Stopping:                 NOTIFY YOUR PHYSICIAN FOR ANY OF THE FOLLOWING:   Fever over 101 degrees for 24 hours. Chest pain, shortness of breath, fever, chills, nausea, vomiting, diarrhea, change in mentation, falling, weakness, bleeding. Severe pain or pain not relieved by medications. Or, any other signs or symptoms that you may have questions about.   PHYSICAL EXAMINATION AT DISCHARGE:  General:          Alert, cooperative, no distress, appears stated age.   + pale  HEENT:           Atraumatic, anicteric sclerae, pink conjunctivae                          No oral ulcers, mucosa moist, throat clear, dentition fair  Neck:               Supple, symmetrical  Lungs:             Clear to auscultation bilaterally.  No Wheezing or Rhonchi. No rales. Chest wall:      No tenderness  No Accessory muscle use. Heart:              Regular  rhythm,  No  murmur   No edema  Abdomen:        Soft, non-tender. Not distended.  Bowel sounds normal  Extremities:     No cyanosis.  No clubbing,                            Skin turgor normal, Capillary refill normal  Skin:                Not pale.  Not Jaundiced  No rashes   Psych:             Not anxious or agitated.   Neurologic:      Alert, moves all extremities, answers questions appropriately and responds to commands       [address]:  Problem List as of 8/12/2021 Date Reviewed: 7/27/2021        Codes Class Noted - Resolved    * (Principal) Intractable vomiting ICD-10-CM: R11.10  ICD-9-CM: 536.2  8/9/2021 - Present        Left leg pain ICD-10-CM: M79.605  ICD-9-CM: 729.5  7/14/2021 - Present        Acute hyperkalemia ICD-10-CM: E87.5  ICD-9-CM: 276.7  6/12/2021 - Present        Encounter for cholecystectomy ICD-10-CM: Z76.89  ICD-9-CM: V65.8  5/6/2021 - Present    Overview Signed 5/6/2021  9:13 AM by Indio Tanner MD     7/2020             Acute left-sided low back pain without sciatica ICD-10-CM: M54.5  ICD-9-CM: 724.2  5/6/2021 - Present        Atrial fibrillation ([facility] ICD-10-CM: I48.91  ICD-9-CM: 427.31  5/6/2021 - Present        Chronic anticoagulation ICD-10-CM: Z79.01  ICD-9-CM: V58.61  5/6/2021 - Present        Stomatitis ICD-10-CM: K12.1  ICD-9-CM: 528.00  3/2/2021 - Present        Thrush of mouth and esophagus ([facility] ICD-10-CM: B37.81, B37.0  ICD-9-CM: 112.84, 112.0  3/2/2021 - Present        ESRD (end stage renal disease) on dialysis [facility]) ICD-10-CM: N18.6, Z99.2  ICD-9-CM: 585.6, V45.11  12/28/2020 - Present        History of DVT (deep vein thrombosis) ICD-10-CM: K34.754  ICD-9-CM: V12.51  12/28/2020 - Present        Hypothyroidism ICD-10-CM: E03.9  ICD-9-CM: 244.9  12/24/2020 - Present        Vertigo ICD-10-CM: R42  ICD-9-CM: 780.4  12/24/2020 - Present        Calculus of gallbladder with acute cholecystitis without obstruction ICD-10-CM: K80.00  ICD-9-CM: 574.00  10/9/2020 - Present        Acute recurrent maxillary sinusitis ICD-10-CM: J01.01  ICD-9-CM: 461.0  8/6/2020 - Present        Ulcer ICD-10-CM: TSM8106  ICD-9-CM: 707.9  Unknown - Present        Obesity ICD-10-CM: E66.9  ICD-9-CM: 278.00  Unknown - Present        Migraine ICD-10-CM: A93.670  ICD-9-CM: 346.90  Unknown - Present        Hypertension ICD-10-CM: I10  ICD-9-CM: 401.9  Unknown - Present        Hypercholesteremia ICD-10-CM: E78.00  ICD-9-CM: 272.0  Unknown - Present        GERD (gastroesophageal reflux disease) ICD-10-CM: K21.9  ICD-9-CM: 530.81  Unknown - Present        Burning with urination ICD-10-CM: R30.0  ICD-9-CM: 788.1  Unknown - Present    Overview Signed 2/13/2017 12:02 PM by Ajay LLANOS     frequent uti             Arrhythmia ICD-10-CM: I49.9  ICD-9-CM: 427.9  Unknown - Present        Hyperkalemia ICD-10-CM: E87.5  ICD-9-CM: 276.7  11/13/2016 - Present        Pruritus ICD-10-CM: L29.9  ICD-9-CM: 698.9  9/29/2016 - Present        Chronic kidney disease, stage III (moderate) ([facility] ICD-10-CM: N18.30  ICD-9-CM: 585.3  9/27/2016 - Present        Recurrent urinary tract infection ICD-10-CM: N39.0  ICD-9-CM: 599.0  9/27/2016 - Present        Nausea and vomiting ICD-10-CM: R11.2  ICD-9-CM: 787.01  4/10/2016 - Present        Diverticulitis of intestine ICD-10-CM: K57.92  ICD-9-CM: 562.11  4/2/2016 - Present        Cystic disease of kidney ICD-10-CM: Q61.9  ICD-9-CM: 753.10  3/16/2015 - Present        Gastritis and duodenitis ICD-10-CM: K29.90  ICD-9-CM: 535.50  2/23/2015 - Present        Anemia ICD-10-CM: D64.9  ICD-9-CM: 285.9  11/10/2014 - Present        Disease due to gram-negative bacillus ICD-10-CM: B96.89  ICD-9-CM: 041.85  10/17/2014 - Present    Overview Signed 2/13/2017 11:58 AM by Ajay LLANOS     Overview:   Acinetobacter baumannii Septicemia and UTI 10/14/2014 (cultures at [facility])             Fever ICD-10-CM: R50.9  ICD-9-CM: 780.60  10/14/2014 - Present        Drug-induced hyperglycemia ICD-10-CM: R73.9, T50.905A  ICD-9-CM: 790.29, E947.9  6/28/2014 - Present        Exacerbation of asthma ICD-10-CM: J45.901  ICD-9-CM: 493.92  6/25/2014 - Present        Systemic inflammatory response syndrome (SIRS) (HCC) ICD-10-CM: R65.10  ICD-9-CM: 995.90  6/23/2014 - Present        Kidney transplant rejection ICD-10-CM: T86.11  ICD-9-CM: 996.81  4/10/2014 - Present    Overview Signed 2/13/2017 11:58 AM by Edmar Gilbert     Overview:   Collected: Colton Weems Dr: Oumou Rubin MD    Case Number: [id number]  [address] [place] [address]    Case Number: [id number]    DIAGNOSIS:  ----------  ALLOGRAFT KIDNEY, NEEDLE BIOPSY (14 YEARS, 2 MONTHS):  - SUBOPTIMAL SPECIMEN: RENAL MEDULLA, INCLUDING DEEP MEDULLA WITH  FOCAL BENIGN UROTHELIAL EPITHELIUM. - MILD NEUTROPHILIC TUBULITIS WITH INTRALUMINAL NEUTROPHILS,  CORRELATE WITH URINE CULTURE TO RULE OUT BACTERIAL INFECTION. - MILD LYMPHOCYTIC TUBULITIS CONSISTENT WITH BORDERLINE CHANGE:  (\"SUSPICIOUS\" FOR ACUTE CELLULAR REJECTION) AS PER BANFF SCHEMA. - CONGESTED PERITUBULAR CAPILLARIES (NON-SPECIFIC), BUT RENAL  VEIN THROMBOSIS OR STENOSIS SHOULD BE EXCLUDED CLINICALLY. - FOCAL SMALL INTERSTITIAL COLLECTION OF CAST MATERIAL  (NON-SPECIFIC), BUT  URINARY OBSTRUCTION SHOULD BE EXCLUDED CLINICALLY. - NO DEFINITIVE STAINING FOR POLYOMAVIRUSES (SEE DESCRIPTION). - FOCAL C4D REACTIVITY ALONG PERITUBULAR CAPILLARY WALLS WITH  HIGH NON-SPECIFIC BACKGROUND STAINING; SIGNIFICANCE UNCERTAIN AND  CORRELATION WITH FLOW PRA IS SUGGESTED TO EXCLUDE EARLY HUMORAL  REJECTION. - SMALL VESSEL SCLEROSIS, MILD TO MODERATE TO SEVERE.  - TUBULAR ATROPHY AND INTERSTITIAL FIBROSIS CANNOT BE ADEQUATELY  ASSESSED  IN THE ABSENCE OF RENAL CORTEX.     Re-Bx - Collected: Rebel Cast Dr: Kelsie Rubio MD    Case Number: [id number]  [address] [place] [address]    Case Number: [id number]    DIAGNOSIS:  ----------  ALLOGRAFT KIDNEY, NEEDLE BIOPSY (14 YEARS, 2 MONTHS):  - BORDERLINE CHANGE (\"SUSPICIOUS\" FOR ACUTE CELLULAR REJECTION)  TO FOCAL  MILD ACUTE CELLULAR REJECTION (BANFF TYPE 1A) (SEE COMMENT). - MILD NEUTROPHILIC INTERSTITIAL INFLAMMATION, CORRELATE WITH  URINE CULTURE  TO RULE OUT SUPERIMPOSED BACTERIAL INFECTION. - SMALL INTERSTITIAL COLLECTIONS OF PAS-POSITIVE CAST MATERIAL  AND CYSTICALLY DILATED PACKER'S SPACE WITH PAS-POSITIVE  PROTEINACEOUS FILTRATE (SEE  COMMENT). - ACUTE ISCHEMIC-TYPE TUBULAR INJURY IS NOTED.  - FOCAL C4D REACTIVITY ALONG PERITUBULAR CAPILLARY WALLS;  SIGNIFICANCE  UNCERTAIN AND CORRELATION WITH FLOW PRA IS SUGGESTED TO EXCLUDE  EARLY  HUMORAL REJECTION. - NEGATIVE IMMUNOSTAIN FOR POLYOMAVIRUSES (BK, HOMER). - CHRONIC CHANGES: GLOBAL SCLEROSIS IN APPROXIMATELY 14 OUT OF 60  (23%)  GLOMERULI, FOCAL SEGMENTAL GLOMERULOSCLEROSIS IN APPROXIMATELY  2 OUT OF  46 (4%) NON-OBSOLESCENT GLOMERULI, MODERATE TO SEVERE  ARTERIOSCLEROSIS,  MILD TO MODERATE TO SEVERE ARTERIOLAR HYALINE SCLEROSIS, AND  MODERATE  INTERSTITIAL FIBROSIS/TUBULAR ATROPHY (SEE COMMENT).              Acute renal failure ([facility] ICD-10-CM: N17.9  ICD-9-CM: 584.9  4/7/2014 - Present        Renal transplant disorder ICD-10-CM: T86.10  ICD-9-CM: 996.81  4/7/2014 - Present        Systemic infection ([facility] ICD-10-CM: A41.9  ICD-9-CM: 038.9  1/7/2014 - Present        Asthma ICD-10-CM: J45.909  ICD-9-CM: 493.90  8/21/2013 - Present        Chronic obstructive pulmonary disease ([facility] ICD-10-CM: J44.9  ICD-9-CM: 344  8/21/2013 - Present        Diverticular disease of large intestine ICD-10-CM: K57.30  ICD-9-CM: 562.10  8/21/2013 - Present        Essential hypertension ICD-10-CM: I10  ICD-9-CM: 401.9  8/21/2013 - Present        Seasonal allergic rhinitis due to pollen ICD-10-CM: J30.1  ICD-9-CM: 477.0  8/21/2013 - Present        Hyperlipidemia ICD-10-CM: E78.5  ICD-9-CM: 272.4  8/21/2013 - Present        UTI (urinary tract infection) ICD-10-CM: N39.0  ICD-9-CM: 599.0  8/21/2013 - Present        Fecal incontinence ICD-10-CM: R15.9  ICD-9-CM: 787.60  4/17/2013 - Present        History of kidney transplant ICD-10-CM: Z94.0  ICD-9-CM: V42.0  4/30/2012 - Present        CMV (cytomegalovirus) antibody positive ICD-10-CM: R76.8  ICD-9-CM: 795.79  4/30/2012 - Present    Overview Signed 2/13/2017 11:58 AM by Dakota LLANOS     Overview:   Donor negative             Hematuria ICD-10-CM: R31.9  ICD-9-CM: 599.70  4/30/2012 - Present        Migraine without status migrainosus, not intractable ICD-10-CM: I28.564  ICD-9-CM: 346.90  2/24/2010 - Present        Renal cyst ICD-10-CM: N28.1  ICD-9-CM: 753.10  1/1/2002 - Present    Overview Signed 2/13/2017 12:01 PM by Dakota LLANOS     kidney transplant              RESOLVED: Gastroesophageal reflux disease ICD-10-CM: K21.9  ICD-9-CM: 530.81  8/21/2013 - 8/3/2021        RESOLVED: Adiposity ICD-10-CM: E66.9  ICD-9-CM: 278.00  2/1/2010 - 8/3/2021              Greater than 35 minutes were spent with the patient on counseling and coordination of care    Signed:   Nela Hunter MD  8/12/2021  10:40 AM

## 2021-08-12 NOTE — PROGRESS NOTES
Patient lives at home with her sister. She has a Nebulizer Machine that she uses. She also receives dialysis with [facility]. Patient states she doesn't need any HH and plans to return back home at discharge. MD made aware.

## 2021-08-12 NOTE — PROGRESS NOTES
Problem: Pressure Injury - Risk of  Goal: *Prevention of pressure injury  Description: Document Quinn Scale and appropriate interventions in the flowsheet. Outcome: Progressing Towards Goal  Note: Pressure Injury Interventions:  Sensory Interventions: Assess changes in LOC    Moisture Interventions: Absorbent underpads    Activity Interventions: Increase time out of bed    Mobility Interventions: Float heels, HOB 30 degrees or less    Nutrition Interventions: Document food/fluid/supplement intake                     Problem: Patient Education: Go to Patient Education Activity  Goal: Patient/Family Education  Outcome: Progressing Towards Goal     Problem: Falls - Risk of  Goal: *Absence of Falls  Description: Document Coleen Fall Risk and appropriate interventions in the flowsheet. Outcome: Progressing Towards Goal  Note: Fall Risk Interventions:  Mobility Interventions: Patient to call before getting OOB         Medication Interventions: Patient to call before getting OOB    Elimination Interventions: Call light in reach              Problem: Patient Education: Go to Patient Education Activity  Goal: Patient/Family Education  Outcome: Progressing Towards Goal     Problem:  Activity Intolerance  Goal: *Oxygen saturation during activity within specified parameters  Outcome: Progressing Towards Goal  Goal: *Able to remain out of bed as prescribed  Outcome: Progressing Towards Goal     Problem: Patient Education: Go to Patient Education Activity  Goal: Patient/Family Education  Outcome: Progressing Towards Goal     Problem: Nutrition Deficit  Goal: *Optimize nutritional status  Outcome: Progressing Towards Goal     Problem: Patient Education: Go to Patient Education Activity  Goal: Patient/Family Education  Outcome: Progressing Towards Goal     Problem: Fluid Volume - Risk of, Imbalanced  Goal: *Balanced intake and output  Outcome: Progressing Towards Goal     Problem: Patient Education: Go to Patient Education Activity  Goal: Patient/Family Education  Outcome: Progressing Towards Goal     Problem: Nausea/Vomiting (Adult)  Goal: *Absence of nausea/vomiting  Outcome: Progressing Towards Goal     Problem: Patient Education: Go to Patient Education Activity  Goal: Patient/Family Education  Outcome: Progressing Towards Goal     Problem: Risk for Spread of Infection  Goal: Prevent transmission of infectious organism to others  Description: Prevent the transmission of infectious organisms to other patients, staff members, and visitors.   Outcome: Progressing Towards Goal     Problem: Patient Education:  Go to Education Activity  Goal: Patient/Family Education  Outcome: Progressing Towards Goal

## 2021-08-12 NOTE — PROGRESS NOTES
Problem: Pressure Injury - Risk of  Goal: *Prevention of pressure injury  Description: Document Quinn Scale and appropriate interventions in the flowsheet.   Outcome: Resolved/Met

## 2021-08-12 NOTE — DIALYSIS
Hemodialysis / Barbara Anderson / [phone]    Vitals   Pre   Post   Assessment   Pre   Post     Temp  Temp: 97.8 °F (36.6 °C) (08/12/21 1554)  97.9 LOC  A & O x 4 A & O x 4   HR   Pulse (Heart Rate): 70 (08/12/21 1554) 75 Lungs   Clear, states no SOB  clear, states no SOB   B/P   BP: (!) 177/80 (08/12/21 1554) 135/70 Cardiac   reg  reg   Resp   Resp Rate: 17 (08/12/21 1554) 18 Skin   No uncovered wounds noted  no uncovered wounds noted   Pain level  Pain Intensity 1: 0 (08/12/21 1100) 0 Edema    trace   decreased   Orders:    Duration:   Start:    1850 End:    2250 Total:   4   Dialyzer:    revaclear   K Bath:    2   Ca Bath:    2.5   Na/Bicarb:        Target Fluid Removal:    2000 ml   Access     Type & Location:   LUE AVF, cannulate 15 gg / no s/s of infection / bruit and thrill noted   Labs     Obtained/Reviewed   Critical Results Called   Date when labs were drawn-  Hgb-    HGB   Date Value Ref Range Status   08/12/2021 8.1 (L) 12.0 - 16.0 g/dL Final     K-    Potassium   Date Value Ref Range Status   08/12/2021 4.6 3.2 - 5.1 mmol/L Final     Ca-   Calcium   Date Value Ref Range Status   08/12/2021 8.7 8.5 - 10.5 mg/dL Final     Bun-   BUN   Date Value Ref Range Status   08/12/2021 29 (H) 9 - 21 mg/dL Final     Creat-   Creatinine   Date Value Ref Range Status   08/12/2021 10.40 (H) 0.70 - 1.20 mg/dL Final        Medications/ Blood Products Given     Name   Dose   Route and Time     retacrit 10,000 units  IVP             Blood Volume Processed (BVP):    79 Net Fluid   Removed:  2000 ml   Comments   Time Out Done: (OTYS)5035  Primary Nurse Rpt Pre:MAUREEN Mary RN  Primary Nurse Rpt Post:NORBERT Clifton RN  Pt Education:treatment adherence  Care Plan:continue with dialysis treatments as ordered  Tx Summary:no SOB, no distress, no complaints, no pain / post treatment, all possible blood returned / hemostasis  Admiting Diagnosis:  Pt's previous clinic-[facility]  Consent signed -  signed, on file  Ramila Consent -   Hepatitis Status- surface antigen negative 8/2/21 & antibody susceptible1/18/21  Machine #-  [id number]  Telemetry status-monitor by hospital staff  Pre-dialysis wt. -  monitor by hospital staff

## 2021-08-13 VITALS
TEMPERATURE: 97.1 F | BODY MASS INDEX: 29.32 KG/M2 | SYSTOLIC BLOOD PRESSURE: 164 MMHG | OXYGEN SATURATION: 99 % | RESPIRATION RATE: 99 BRPM | DIASTOLIC BLOOD PRESSURE: 74 MMHG | HEART RATE: 69 BPM | HEIGHT: 61 IN | WEIGHT: 155.3 LBS

## 2021-08-13 LAB
INR PPP: 1.3 (ref 0.8–1.2)
PROTHROMBIN TIME: 15.3 SEC (ref 11.5–15.2)

## 2021-08-13 PROCEDURE — 94640 AIRWAY INHALATION TREATMENT: CPT

## 2021-08-13 PROCEDURE — 85610 PROTHROMBIN TIME: CPT

## 2021-08-13 PROCEDURE — 74011250636 HC RX REV CODE- 250/636: Performed by: PHYSICIAN ASSISTANT

## 2021-08-13 PROCEDURE — 74011250637 HC RX REV CODE- 250/637: Performed by: PHYSICIAN ASSISTANT

## 2021-08-13 PROCEDURE — 94761 N-INVAS EAR/PLS OXIMETRY MLT: CPT

## 2021-08-13 PROCEDURE — 36415 COLL VENOUS BLD VENIPUNCTURE: CPT

## 2021-08-13 RX ADMIN — BUDESONIDE AND FORMOTEROL FUMARATE DIHYDRATE 1 PUFF: 160; 4.5 AEROSOL RESPIRATORY (INHALATION) at 07:47

## 2021-08-13 RX ADMIN — Medication 10 ML: at 00:05

## 2021-08-13 RX ADMIN — DICYCLOMINE HYDROCHLORIDE 10 MG: 10 CAPSULE ORAL at 00:03

## 2021-08-13 RX ADMIN — ATORVASTATIN CALCIUM 10 MG: 10 TABLET, FILM COATED ORAL at 00:03

## 2021-08-13 RX ADMIN — METRONIDAZOLE 500 MG: 250 TABLET ORAL at 00:03

## 2021-08-13 RX ADMIN — SUCRALFATE 1 G: 1 TABLET ORAL at 00:04

## 2021-08-13 RX ADMIN — HEPARIN SODIUM 5000 UNITS: 5000 INJECTION INTRAVENOUS; SUBCUTANEOUS at 07:21

## 2021-08-13 RX ADMIN — HEPARIN SODIUM 5000 UNITS: 5000 INJECTION INTRAVENOUS; SUBCUTANEOUS at 00:03

## 2021-08-13 RX ADMIN — LEVOTHYROXINE SODIUM 50 MCG: 0.05 TABLET ORAL at 07:21

## 2021-08-13 NOTE — PROGRESS NOTES
Report received from off going nurse. Assumed care of patientAlex Arellano Needs currently in Dialysis. [id number]- Patient back to floor from dialysis. Patient VSS. Medications administered. Patient tolerated well. 0210- Patient in bed sleeping. No needs noted at this time. Will continue to monitor    5674- Patient VSS in bed resting quietly. No needs noted at this.     0721-Administered patient medications. Patient tolerated them well.  No needs noted at this time

## 2021-08-13 NOTE — DISCHARGE SUMMARY
Discharge Summary       PATIENT ID: Gurwinder Blount  MRN: [id number]   YOB: 1955    DATE OF ADMISSION: 8/9/2021  5:46 PM    DATE OF DISCHARGE: 08/13/21    PRIMARY CARE PROVIDER: Andrey Lynch MD     ATTENDING PHYSICIAN: Fabio Silva MD  DISCHARGING PROVIDER: Fabio Silva MD        CONSULTATIONS: IP CONSULT TO NEPHROLOGY    PROCEDURES/SURGERIES: * No surgery found *    ADMITTING [address] [place] [address] COURSE:   Gurwinder Blount is a 61 y.o. female  has a past medical history of Anemia NEC, Arrhythmia, Asthma, Asthma, Burning with urination, Chronic kidney disease, CMV (cytomegalovirus) antibody positive, Dialysis patient ([facility] [address] Dyspepsia and other specified disorders of function of stomach, Essential hypertension, GERD (gastroesophageal reflux disease), Hypercholesteremia, Hypertension, Migraine, Obesity, Renal cyst (2002), and Ulcer.       Patient presents to emergency department with complaint of fatigue and diarrhea. Patient has had longstanding GI history with similar problem and previous C. difficile and VRE. She does see GI and she is currently on numerous medications for the same. She was seen in our facility a week ago and yesterday at [facility] for today's presenting problem. Yesterday's study showed diverticulitis and she was started on antibiotic.      Patient was unable to complete dialysis yesterday due to the nausea and diarrhea. Her potassium is slightly elevated while her creatinine is 8.8 and likely trending higher.     Patient evaluated in her room and found to be sleeping reported 9/10 pain and nauseous. States that she has been having GI problems for quite a while is a worse now.     Patient will be admitted for intractable nausea vomiting and general supportive care but length of stay 1 midnight.         DISCHARGE DIAGNOSES / PLAN:      Assessment & Plan:         Intractable vomiting  -Continue with Phenergan Zofran  -dc ivf due to esrd,  - all symptoms much improved     Gastritis and duodenitis  -cont levo and flagyl for previously diagnosed Acute Diverticultis on recnet admission     Essential hypertension  -controlled     Anemia, macrocytic  -Etiology likely due to chronic disease  -Patient had previously received Epogen injections  -Type and screen 2 units PRBCs  - b12 and folate checked 6/21, wnl  - pt accepts transfusioin, 1 unit due to hgb 6.0, with symtpoms of extreme lethargy  - check occult stool  - suspect some component is due to bleeding from Right femoral catheter that was placed in ed, nursing reports intermiteent bleeding yesteday     ESRD (end stage renal disease) on dialysis [facility])  -Nephrology consultation  - hemodialysis today, prefer transfusion at hd     Hx dvt and afib  - resume coumadin when confirm no active bleeding  - inr currently 2.0      Day of dc, GI symtpoms impoved, she will finish augemtin started at [place]. She will get HD prior to DC today. She will resume coumadin, and cont to follow with pcp and gi. Watch h/h. Total dc time 35 mins      8/13- pt stayed overngiht due to late hd and no ride, remains appropriate for dc    FOLLOW UP APPOINTMENTS:    Follow-up Information     Follow up With Specialties Details Why Lilia Virk MD Family Medicine  2- @ 1:30PM [address] [facility]  [phone]               DIET: renal      DISCHARGE MEDICATIONS:  Discharge Medication List as of 8/13/2021  7:56 AM      START taking these medications    Details   metroNIDAZOLE (FLAGYL) 500 mg tablet Take 1 Tablet by mouth every twelve (12) hours for 4 days. , Normal, Disp-8 Tablet, R-0      amoxicillin-clavulanate (Augmentin) 500-125 mg per tablet Take 1 Tablet by mouth daily for 4 days.  Finish augmentin you already have at home from prior visit to ED at [facility]., No Print, Disp-4 Tablet, R-0         CONTINUE these medications which have NOT CHANGED    Details   calcitRIOL (ROCALTROL) 0.25 mcg capsule Take 1 Capsule by mouth as directed. Take on non dialysis days, Historical Med      doxercalciferol (HECTOROL IV) 14 mcg by IntraVENous route two (2) days a week., Historical Med      lidocaine-prilocaine (EMLA) topical cream as directed., Historical Med      methoxy peg-epoetin beta (MIRCERA INJECTION) 100 mcg., Historical Med      predniSONE (DELTASONE) 5 mg tablet Take 5 mg by mouth daily. , Historical Med      warfarin sodium (WARFARIN PO) Take  by mouth. Patient unclear on dose, last recorded fill in June, Historical Med      ondansetron (Zofran ODT) 4 mg disintegrating tablet Take 1 Tablet by mouth every eight (8) hours as needed for Nausea., Normal, Disp-14 Tablet, R-0      Wixela Inhub 250-50 mcg/dose diskus inhaler INHALE 1 PUFF BY MOUTH EVERY 12 HOURS, Normal, Disp-60 Each, R-3      RenaPlex-D 800 mcg-12.5 mg -2,000 unit tab Take 1 Tablet by mouth daily. , Historical Med, RITU      sevelamer carbonate (RENVELA) 800 mg tab tab Take 800 mg by mouth three (3) times daily. , Historical Med      zolpidem (AMBIEN) 5 mg tablet Take 5 mg by mouth nightly as needed., Historical Med      levothyroxine (SYNTHROID) 50 mcg tablet TAKE 1 TABLET BY MOUTH EVERY DAY, Normal, Disp-90 Tab, R-3      meclizine (ANTIVERT) 25 mg tablet TAKE 1 TABLET BY MOUTH THREE TIMES DAILY FOR UP TO 10 DAYS AS NEEDED FOR DIZZINESS, Normal, Disp-21 Tab, R-0      dicyclomine (BENTYL) 10 mg capsule TAKE 1 CAPSULE BY MOUTH FOUR TIMES DAILY, Normal, Disp-120 Cap, R-1      sucralfate (CARAFATE) 1 gram tablet TAKE 1 TABLET BY MOUTH FOUR TIMES DAILY, Normal, Disp-360 Tab, R-0      carvediloL (COREG) 6.25 mg tablet Take 1 Tab by mouth two (2) times daily (with meals). , Normal, Disp-180 Tab, R-2      valGANciclovir (VALCYTE) 450 mg tablet Take 2 Tabs by mouth daily. , Normal, Disp-90 Tab, R-5      simvastatin (ZOCOR) 20 mg tablet TAKE 1 TABLET BY MOUTH DAILY AS DIRECTED., Normal, Disp-90 Tab, R-1      sertraline (ZOLOFT) 50 mg tablet Take 1 Tab by mouth daily. , Normal, Disp-90 Tab, R-0      albuterol (PROVENTIL VENTOLIN) 2.5 mg /3 mL (0.083 %) nebu USE 1 VIAL VIA NEBULIZER THREE TIMES DAILY, Normal, Disp-90 mL, R-3      amiodarone (CORDARONE) 200 mg tablet TAKE 1 TABLET BY MOUTH EVERY DAY, Normal, Disp-90 Tab, R-2      Dexilant 60 mg CpDB capsule (delayed release) TAKE ONE CAPSULE BY MOUTH EVERY DAY, Normal, Disp-30 Cap, R-5      aluminum & magnesium hydroxide-simethicone (Maalox Maximum Strength) 400-400-40 mg/5 mL suspension Take 10 mL by mouth every six (6) hours as needed for Indigestion. , Normal, Disp-250 mL,R-0      ESTRACE 0.01 % (0.1 mg/gram) vaginal cream INSERT 2 GRAMS INTO VAGINA EVERY MONDAY AND THURSDAY, Normal, Disp-42.5 g,R-5      cranberry extract 425 mg cap 425 mg., Historical Med      LORazepam (ATIVAN) 0.5 mg tablet Take 0.5 mg by mouth daily. , Historical Med      fluticasone (FLONASE) 50 mcg/actuation nasal spray 1 Spray., Historical Med      EPINEPHrine (EPIPEN) 0.3 mg/0.3 mL injection 0.3 mg., Historical Med      hyoscyamine SL (LEVSIN/SL) 0.125 mg SL tablet 0.125 mg by SubLINGual route every four (4) hours as needed. Historical Med, 0.125 mg      montelukast (SINGULAIR) 10 mg tablet Take 10 mg by mouth daily. Historical Med, 10 mg         STOP taking these medications       pantoprazole (Protonix) 20 mg tablet Comments:   Reason for Stopping:                 NOTIFY YOUR PHYSICIAN FOR ANY OF THE FOLLOWING:   Fever over 101 degrees for 24 hours. Chest pain, shortness of breath, fever, chills, nausea, vomiting, diarrhea, change in mentation, falling, weakness, bleeding. Severe pain or pain not relieved by medications. Or, any other signs or symptoms that you may have questions about.   PHYSICAL EXAMINATION AT DISCHARGE:  General:          Alert, cooperative, no distress, appears stated age.   + pale  HEENT:           Atraumatic, anicteric sclerae, pink conjunctivae                          No oral ulcers, mucosa moist, throat clear, dentition fair  Neck:               Supple, symmetrical  Lungs:             Clear to auscultation bilaterally.  No Wheezing or Rhonchi. No rales. Chest wall:      No tenderness  No Accessory muscle use. Heart:              Regular  rhythm,  No  murmur   No edema  Abdomen:        Soft, non-tender. Not distended.  Bowel sounds normal  Extremities:     No cyanosis.  No clubbing,                            Skin turgor normal, Capillary refill normal  Skin:                Not pale.  Not Jaundiced  No rashes   Psych:             Not anxious or agitated.   Neurologic:      Alert, moves all extremities, answers questions appropriately and responds to commands       [address]:  Problem List as of 8/13/2021 Date Reviewed: 7/27/2021        Codes Class Noted - Resolved    * (Principal) Intractable vomiting ICD-10-CM: R11.10  ICD-9-CM: 536.2  8/9/2021 - Present        Left leg pain ICD-10-CM: M79.605  ICD-9-CM: 729.5  7/14/2021 - Present        Acute hyperkalemia ICD-10-CM: E87.5  ICD-9-CM: 276.7  6/12/2021 - Present        Encounter for cholecystectomy ICD-10-CM: Z76.89  ICD-9-CM: V65.8  5/6/2021 - Present    Overview Signed 5/6/2021  9:13 AM by Tana Jordan MD     7/2020             Acute left-sided low back pain without sciatica ICD-10-CM: M54.5  ICD-9-CM: 724.2  5/6/2021 - Present        Atrial fibrillation ([facility] ICD-10-CM: I48.91  ICD-9-CM: 427.31  5/6/2021 - Present        Chronic anticoagulation ICD-10-CM: Z79.01  ICD-9-CM: V58.61  5/6/2021 - Present        Stomatitis ICD-10-CM: K12.1  ICD-9-CM: 528.00  3/2/2021 - Present        Thrush of mouth and esophagus ([facility] ICD-10-CM: B37.81, B37.0  ICD-9-CM: 112.84, 112.0  3/2/2021 - Present        ESRD (end stage renal disease) on dialysis [facility]) ICD-10-CM: N18.6, Z99.2  ICD-9-CM: 585.6, V45.11  12/28/2020 - Present        History of DVT (deep vein thrombosis) ICD-10-CM: Y06.827  ICD-9-CM: V12.51  12/28/2020 - Present        Hypothyroidism ICD-10-CM: E03.9  ICD-9-CM: 244.9  12/24/2020 - Present        Vertigo ICD-10-CM: R42  ICD-9-CM: 780.4  12/24/2020 - Present        Calculus of gallbladder with acute cholecystitis without obstruction ICD-10-CM: K80.00  ICD-9-CM: 574.00  10/9/2020 - Present        Acute recurrent maxillary sinusitis ICD-10-CM: J01.01  ICD-9-CM: 461.0  8/6/2020 - Present        Ulcer ICD-10-CM: [id number]  ICD-9-CM: 707.9  Unknown - Present        Obesity ICD-10-CM: E66.9  ICD-9-CM: 278.00  Unknown - Present        Migraine ICD-10-CM: A93.105  ICD-9-CM: 346.90  Unknown - Present        Hypertension ICD-10-CM: I10  ICD-9-CM: 401.9  Unknown - Present        Hypercholesteremia ICD-10-CM: E78.00  ICD-9-CM: 272.0  Unknown - Present        GERD (gastroesophageal reflux disease) ICD-10-CM: K21.9  ICD-9-CM: 530.81  Unknown - Present        Burning with urination ICD-10-CM: R30.0  ICD-9-CM: 788.1  Unknown - Present    Overview Signed 2/13/2017 12:02 PM by Baron Dooley A     frequent uti             Arrhythmia ICD-10-CM: I49.9  ICD-9-CM: 427.9  Unknown - Present        Hyperkalemia ICD-10-CM: E87.5  ICD-9-CM: 276.7  11/13/2016 - Present        Pruritus ICD-10-CM: L29.9  ICD-9-CM: 698.9  9/29/2016 - Present        Chronic kidney disease, stage III (moderate) ([facility] [address] ICD-10-CM: N18.30  ICD-9-CM: 585.3  9/27/2016 - Present        Recurrent urinary tract infection ICD-10-CM: N39.0  ICD-9-CM: 599.0  9/27/2016 - Present        Nausea and vomiting ICD-10-CM: R11.2  ICD-9-CM: 787.01  4/10/2016 - Present        Diverticulitis of intestine ICD-10-CM: K57.92  ICD-9-CM: 562.11  4/2/2016 - Present        Cystic disease of kidney ICD-10-CM: Q61.9  ICD-9-CM: 753.10  3/16/2015 - Present        Gastritis and duodenitis ICD-10-CM: K29.90  ICD-9-CM: 535.50  2/23/2015 - Present        Anemia ICD-10-CM: D64.9  ICD-9-CM: 285.9  11/10/2014 - Present        Disease due to gram-negative bacillus ICD-10-CM: B96.89  ICD-9-CM: 041.85  10/17/2014 - Present    Overview Signed 2/13/2017 11:58 AM by Shin LLANOS     Overview:   Acinetobacter baumannii Septicemia and UTI 10/14/2014 (cultures at [facility])             Fever ICD-10-CM: R50.9  ICD-9-CM: 780.60  10/14/2014 - Present        Drug-induced hyperglycemia ICD-10-CM: R73.9, T50.905A  ICD-9-CM: 790.29, E947.9  6/28/2014 - Present        Exacerbation of asthma ICD-10-CM: J45.901  ICD-9-CM: 493.92  6/25/2014 - Present        Systemic inflammatory response syndrome (SIRS) (HCC) ICD-10-CM: R65.10  ICD-9-CM: 995.90  6/23/2014 - Present        Kidney transplant rejection ICD-10-CM: T86.11  ICD-9-CM: 996.81  4/10/2014 - Present    Overview Signed 2/13/2017 11:58 AM by Raghav Mittal     Overview:   Collected: Sumaya Larson Dr: Jose Giron MD    Case Number: [id number]  [address] [place] [address]    Case Number: [id number]    DIAGNOSIS:  ----------  ALLOGRAFT KIDNEY, NEEDLE BIOPSY (14 YEARS, 2 MONTHS):  - SUBOPTIMAL SPECIMEN: RENAL MEDULLA, INCLUDING DEEP MEDULLA WITH  FOCAL BENIGN UROTHELIAL EPITHELIUM. - MILD NEUTROPHILIC TUBULITIS WITH INTRALUMINAL NEUTROPHILS,  CORRELATE WITH URINE CULTURE TO RULE OUT BACTERIAL INFECTION. - MILD LYMPHOCYTIC TUBULITIS CONSISTENT WITH BORDERLINE CHANGE:  (\"SUSPICIOUS\" FOR ACUTE CELLULAR REJECTION) AS PER BANFF SCHEMA. - CONGESTED PERITUBULAR CAPILLARIES (NON-SPECIFIC), BUT RENAL  VEIN THROMBOSIS OR STENOSIS SHOULD BE EXCLUDED CLINICALLY. - FOCAL SMALL INTERSTITIAL COLLECTION OF CAST MATERIAL  (NON-SPECIFIC), BUT  URINARY OBSTRUCTION SHOULD BE EXCLUDED CLINICALLY. - NO DEFINITIVE STAINING FOR POLYOMAVIRUSES (SEE DESCRIPTION). - FOCAL C4D REACTIVITY ALONG PERITUBULAR CAPILLARY WALLS WITH  HIGH NON-SPECIFIC BACKGROUND STAINING; SIGNIFICANCE UNCERTAIN AND  CORRELATION WITH FLOW PRA IS SUGGESTED TO EXCLUDE EARLY HUMORAL  REJECTION.   - SMALL VESSEL SCLEROSIS, MILD TO MODERATE TO SEVERE.  - TUBULAR ATROPHY AND INTERSTITIAL FIBROSIS CANNOT BE ADEQUATELY  ASSESSED  IN THE ABSENCE OF RENAL CORTEX. Re-Bx - Collected: Bhavesh Francis Dr: Jennifer Ham MD    Case Number: [id number]  [address] [place] [address]    Case Number: [id number]    DIAGNOSIS:  ----------  ALLOGRAFT KIDNEY, NEEDLE BIOPSY (14 YEARS, 2 MONTHS):  - BORDERLINE CHANGE (\"SUSPICIOUS\" FOR ACUTE CELLULAR REJECTION)  TO FOCAL  MILD ACUTE CELLULAR REJECTION (BANFF TYPE 1A) (SEE COMMENT). - MILD NEUTROPHILIC INTERSTITIAL INFLAMMATION, CORRELATE WITH  URINE CULTURE  TO RULE OUT SUPERIMPOSED BACTERIAL INFECTION. - SMALL INTERSTITIAL COLLECTIONS OF PAS-POSITIVE CAST MATERIAL  AND CYSTICALLY DILATED PACKER'S SPACE WITH PAS-POSITIVE  PROTEINACEOUS FILTRATE (SEE  COMMENT). - ACUTE ISCHEMIC-TYPE TUBULAR INJURY IS NOTED.  - FOCAL C4D REACTIVITY ALONG PERITUBULAR CAPILLARY WALLS;  SIGNIFICANCE  UNCERTAIN AND CORRELATION WITH FLOW PRA IS SUGGESTED TO EXCLUDE  EARLY  HUMORAL REJECTION. - NEGATIVE IMMUNOSTAIN FOR POLYOMAVIRUSES (BK, HOMER). - CHRONIC CHANGES: GLOBAL SCLEROSIS IN APPROXIMATELY 14 OUT OF 60  (23%)  GLOMERULI, FOCAL SEGMENTAL GLOMERULOSCLEROSIS IN APPROXIMATELY  2 OUT OF  46 (4%) NON-OBSOLESCENT GLOMERULI, MODERATE TO SEVERE  ARTERIOSCLEROSIS,  MILD TO MODERATE TO SEVERE ARTERIOLAR HYALINE SCLEROSIS, AND  MODERATE  INTERSTITIAL FIBROSIS/TUBULAR ATROPHY (SEE COMMENT).              Acute renal failure ([email or web address] ICD-10-CM: N17.9  ICD-9-CM: 584.9  4/7/2014 - Present        Renal transplant disorder ICD-10-CM: T86.10  ICD-9-CM: 996.81  4/7/2014 - Present        Systemic infection ([email or web address] ICD-10-CM: A41.9  ICD-9-CM: 038.9  1/7/2014 - Present        Asthma ICD-10-CM: J45.909  ICD-9-CM: 493.90  8/21/2013 - Present        Chronic obstructive pulmonary disease ([facility] [address] ICD-10-CM: J44.9  ICD-9-CM: 213  8/21/2013 - Present        Diverticular disease of large intestine ICD-10-CM: K57.30  ICD-9-CM: 562.10  8/21/2013 - Present        Essential hypertension ICD-10-CM: I10  ICD-9-CM: 401.9  8/21/2013 - Present        Seasonal allergic rhinitis due to pollen ICD-10-CM: J30.1  ICD-9-CM: 477.0  8/21/2013 - Present        Hyperlipidemia ICD-10-CM: E78.5  ICD-9-CM: 272.4  8/21/2013 - Present        UTI (urinary tract infection) ICD-10-CM: N39.0  ICD-9-CM: 599.0  8/21/2013 - Present        Fecal incontinence ICD-10-CM: R15.9  ICD-9-CM: 787.60  4/17/2013 - Present        History of kidney transplant ICD-10-CM: Z94.0  ICD-9-CM: V42.0  4/30/2012 - Present        CMV (cytomegalovirus) antibody positive ICD-10-CM: R76.8  ICD-9-CM: 795.79  4/30/2012 - Present    Overview Signed 2/13/2017 11:58 AM by Sade LLANOS     Overview:   Donor negative             Hematuria ICD-10-CM: R31.9  ICD-9-CM: 599.70  4/30/2012 - Present        Migraine without status migrainosus, not intractable ICD-10-CM: U75.397  ICD-9-CM: 346.90  2/24/2010 - Present        Renal cyst ICD-10-CM: N28.1  ICD-9-CM: 753.10  1/1/2002 - Present    Overview Signed 2/13/2017 12:01 PM by Sade LLANOS     kidney transplant              RESOLVED: Gastroesophageal reflux disease ICD-10-CM: K21.9  ICD-9-CM: 530.81  8/21/2013 - 8/3/2021        RESOLVED: Adiposity ICD-10-CM: E66.9  ICD-9-CM: 278.00  2/1/2010 - 8/3/2021              Greater than 35 minutes were spent with the patient on counseling and coordination of care    Signed:   Abraham Waller MD  8/13/2021  10:40 AM

## 2021-08-13 NOTE — ADT AUTH CERT NOTES
Anemia, Iron Deficiency or Unspecified - Care Day 2 (8/12/2021) by Archie Rodriguez RN       Review Entered Review Status   8/13/2021 14:18 Completed      Criteria Review      Care Day: 2 Care Date: 8/12/2021 Level of Care: Inpatient Floor    Guideline Day 2    Level Of Care    (X) Floor to discharge    8/13/2021 14:18:26 EDT by Carlos Dickens      floor    Clinical Status    ( ) * Hemodynamic stability    ( ) * Mental status at baseline    ( ) * Active blood loss absent    ( ) * Signs and symptoms of anemia absent or improved    ( ) * Hgb/Hct level stable and acceptable for next level of care    ( ) * Etiology of anemia requiring inpatient care absent    ( ) * Discharge plans and education understood    Activity    ( ) * Ambulatory or acceptable for next level of care    Routes    ( ) * Oral hydration    ( ) * Oral medications or regimen acceptable for next level of care    ( ) * Oral diet or acceptable for next level of care    Interventions    (X) Hgb/Hct    8/13/2021 14:18:26 EDT by Carlos Dickens      Results for Dee PAZ (MRN [id number]) as of 8/13/2021 14:07    8/12/2021 04:25  HGB: 8.1 (L)  HCT: 26.1 (L)    * Milestone   Additional Notes   EDURES/SURGERIES: * No surgery found *       ADMITTING [address] [place] [address] COURSE:    Dang Matt a 61 y. o. female  has a past medical history of Anemia NEC, Arrhythmia, Asthma, Asthma, Burning with urination, Chronic kidney disease, CMV (cytomegalovirus) antibody positive, Dialysis patient ([facility] [address] Dyspepsia and other specified disorders of function of stomach, Essential hypertension, GERD (gastroesophageal reflux disease), Hypercholesteremia, Hypertension, Migraine, Obesity, Renal cyst (2002), and Ulcer.         Patient presents to emergency department with complaint of fatigue and diarrhea.  Patient has had longstanding GI history with similar problem and previous C. difficile and VRE.  She does see GI and she is currently on numerous medications for the same. Gemini Gallegos was seen in our facility a week ago and yesterday at [address] today's presenting problem.  Yesterday's study showed diverticulitis and she was started on antibiotic.        Patient was unable to complete dialysis yesterday due to the nausea and diarrhea.  Her potassium is slightly elevated while her creatinine is 8.8 and likely trending higher.       Patient evaluated in her room and found to be sleeping reported 9/10 pain and nauseous.  States that she has been having GI problems for quite a while is a worse now.       Patient will be admitted for intractable nausea vomiting and general supportive care but length of stay 1 midnight.               DISCHARGE DIAGNOSES / PLAN:        Assessment & Plan:           Intractable vomiting   -Continue with Phenergan Zofran   -dc ivf due to esrd,   - all symptoms much improved       Gastritis and duodenitis   -cont levo and flagyl for previously diagnosed Acute Diverticultis on recnet admission       Essential hypertension   -controlled       Anemia, macrocytic   -Etiology likely due to chronic disease   -Patient had previously received Epogen injections   -Type and screen 2 units PRBCs   - b12 and folate checked 6/21, wnl   - pt accepts transfusioin, 1 unit due to hgb 6.0, with symtpoms of extreme lethargy   - check occult stool   - suspect some component is due to bleeding from Right femoral catheter that was placed in ed, nursing reports intermiteent bleeding yesteday       ESRD (end stage renal disease) on dialysis [facility])   -Nephrology consultation   - hemodialysis today, prefer transfusion at hd       Hx dvt and afib   - resume coumadin when confirm no active bleeding   - inr currently 2.0           Day of dc, GI symtpoms impoved, she will finish augemtin started at [facility]. She will get HD prior to DC today. She will resume coumadin, and cont to follow with pcp and gi. Watch h/h.    Total dc time 35 mins       FOLLOW UP APPOINTMENTS:     Follow-up Information      Follow up With Specialties Details Why Alvin Sims MD Family Medicine   5- @ 1:30PM Taylor Tucker [phone]                       DIET: renal           DISCHARGE MEDICATIONS:   Current Discharge Medication List       START taking these medications     Details   metroNIDAZOLE (FLAGYL) 500 mg tablet Take 1 Tablet by mouth every twelve (12) hours for 4 days. Qty: 8 Tablet, Refills: 0   Start date: 8/12/2021, End date: 8/16/2021       amoxicillin-clavulanate (Augmentin) 500-125 mg per tablet Take 1 Tablet by mouth daily for 4 days. Finish augmentin you already have at home from prior visit to ED at [facility]. Qty: 4 Tablet, Refills: 0   Start date: 8/12/2021, End date: 8/16/2021           CONTINUE these medications which have NOT CHANGED     Details   calcitRIOL (ROCALTROL) 0.25 mcg capsule Take 1 Capsule by mouth as directed. Take on non dialysis days       doxercalciferol (HECTOROL IV) 14 mcg by IntraVENous route two (2) days a week.       lidocaine-prilocaine (EMLA) topical cream as directed.       predniSONE (DELTASONE) 5 mg tablet Take 5 mg by mouth daily.       ondansetron (Zofran ODT) 4 mg disintegrating tablet Take 1 Tablet by mouth every eight (8) hours as needed for Nausea.    Qty: 14 Tablet, Refills: 0       Wixela Inhub 250-50 mcg/dose diskus inhaler INHALE 1 PUFF BY MOUTH EVERY 12 HOURS   Qty: 60 Each, Refills: 3     Associated Diagnoses: Seasonal allergic rhinitis due to pollen       RenaPlex-D 800 mcg-12.5 mg -2,000 unit tab Take 1 Tablet by mouth daily.       sevelamer carbonate (RENVELA) 800 mg tab tab Take 800 mg by mouth three (3) times daily.       zolpidem (AMBIEN) 5 mg tablet Take 5 mg by mouth nightly as needed.       levothyroxine (SYNTHROID) 50 mcg tablet TAKE 1 TABLET BY MOUTH EVERY DAY   Qty: 90 Tab, Refills: 3       meclizine (ANTIVERT) 25 mg tablet TAKE 1 TABLET BY MOUTH THREE TIMES DAILY FOR UP TO 10 DAYS AS NEEDED FOR DIZZINESS   Qty: 21 Tab, Refills: 0     Associated Diagnoses: Dizziness       dicyclomine (BENTYL) 10 mg capsule TAKE 1 CAPSULE BY MOUTH FOUR TIMES DAILY   Qty: 120 Cap, Refills: 1       sucralfate (CARAFATE) 1 gram tablet TAKE 1 TABLET BY MOUTH FOUR TIMES DAILY   Qty: 360 Tab, Refills: 0       carvediloL (COREG) 6.25 mg tablet Take 1 Tab by mouth two (2) times daily (with meals). Qty: 180 Tab, Refills: 2     Associated Diagnoses: Hypertension, unspecified type       valGANciclovir (VALCYTE) 450 mg tablet Take 2 Tabs by mouth daily. Qty: 90 Tab, Refills: 5     Associated Diagnoses: ESRD (end stage renal disease) on dialysis ([facility])       simvastatin (ZOCOR) 20 mg tablet TAKE 1 TABLET BY MOUTH DAILY AS DIRECTED. Qty: 90 Tab, Refills: 1       sertraline (ZOLOFT) 50 mg tablet Take 1 Tab by mouth daily. Qty: 90 Tab, Refills: 0       albuterol (PROVENTIL VENTOLIN) 2.5 mg /3 mL (0.083 %) nebu USE 1 VIAL VIA NEBULIZER THREE TIMES DAILY   Qty: 90 mL, Refills: 3       amiodarone (CORDARONE) 200 mg tablet TAKE 1 TABLET BY MOUTH EVERY DAY   Qty: 90 Tab, Refills: 2       Dexilant 60 mg CpDB capsule (delayed release) TAKE ONE CAPSULE BY MOUTH EVERY DAY   Qty: 30 Cap, Refills: 5       aluminum & magnesium hydroxide-simethicone (Maalox Maximum Strength) 400-400-40 mg/5 mL suspension Take 10 mL by mouth every six (6) hours as needed for Indigestion. Qty: 250 mL, Refills: 0       ESTRACE 0.01 % (0.1 mg/gram) vaginal cream INSERT 2 GRAMS INTO VAGINA EVERY MONDAY AND THURSDAY   Qty: 42.5 g, Refills: 5       cranberry extract 425 mg cap 425 mg.       LORazepam (ATIVAN) 0.5 mg tablet Take 0.5 mg by mouth daily.       fluticasone (FLONASE) 50 mcg/actuation nasal spray 1 Spray.       hyoscyamine SL (LEVSIN/SL) 0.125 mg SL tablet 0.125 mg by SubLINGual route every four (4) hours as needed.       montelukast (SINGULAIR) 10 mg tablet Take 10 mg by mouth daily.         methoxy peg-epoetin beta (MIRCERA INJECTION) 100 mcg.     warfarin sodium (WARFARIN PO) Take  by mouth. Patient unclear on dose, last recorded fill in June       EPINEPHrine (EPIPEN) 0.3 mg/0.3 mL injection 0.3 mg.           STOP taking these medications         pantoprazole (Protonix) 20 mg tablet Comments:    Reason for Stopping:                   Results for Viola Barillas (MRN [id number]) as of 8/13/2021 14:07      8/12/2021 04:25   WBC: 6.0   RBC: 2.46 (L)   HGB: 8.1 (L)   HCT: 26.1 (L)   MCV: 106.1 (H)   MCH: 32.9   MCHC: 31.0   RDW: 19.1 (H)   PLATELET: 404   MPV: 8.8 (L)   NEUTROPHILS: 61   LYMPHOCYTES: 27   MONOCYTES: 9   EOSINOPHILS: 2   BASOPHILS: 1   IMMATURE GRANULOCYTES: 0   ABS. NEUTROPHILS: 3.7   ABS. IMM. GRANS.: 0.0   ABS. LYMPHOCYTES: 1.6   ABS. MONOCYTES: 0.5   ABS. EOSINOPHILS: 0.1   ABS. BASOPHILS: 0.1   Results for Viola Barillas (MRN [id number]) as of 8/13/2021 14:07      8/12/2021 04:25   Sodium: 139   Potassium: 4.6   Chloride: 107   CO2: 22   Anion gap: 10   Glucose: 77   BUN: 29 (H)   Creatinine: 10.40 (H)   BUN/Creatinine ratio: 3   Calcium: 8.7   Phosphorus: 5.3 (H)   Magnesium: 2.0   GFR est non-AA: 4   GFR est AA: 5          NOTIFY YOUR PHYSICIAN FOR ANY OF THE FOLLOWING:    Fever over 101 degrees for 24 hours. Chest pain, shortness of breath, fever, chills, nausea, vomiting, diarrhea, change in mentation, falling, weakness, bleeding. Severe pain or pain not relieved by medications. Or, any other signs or symptoms that you may have questions about. PHYSICAL EXAMINATION AT DISCHARGE:   General:          Alert, cooperative, no distress, appears stated age.   + pale   HEENT:           Atraumatic, anicteric sclerae, pink conjunctivae                           No oral ulcers, mucosa moist, throat clear, dentition fair   Neck:               Supple, symmetrical   Lungs:             Clear to auscultation bilaterally.  No Wheezing or Rhonchi. No rales. Chest wall:      No tenderness  No Accessory muscle use.    Heart:              Regular  rhythm,  No  murmur   No edema   Abdomen:        Soft, non-tender. Not distended.  Bowel sounds normal   Extremities:     No cyanosis.  No clubbing,                             Skin turgor normal, Capillary refill normal   Skin:                Not pale.  Not Jaundiced  No rashes    Psych:             Not anxious or agitated. Neurologic:      Alert, moves all extremities, answers questions appropriately and responds to commands            CHRONIC MEDICAL DIAGNOSES:   Problem List as of 8/12/2021 Date Reviewed: 7/27/2021     Codes Class Noted - Resolved     * (Principal) Intractable vomiting ICD-10-CM: R11.10   ICD-9-CM: 536.2   8/9/2021 - Present           Left leg pain ICD-10-CM: M79.605   ICD-9-CM: 749. 5   7/14/2021 - Present           Acute hyperkalemia ICD-10-CM: E87.5   ICD-9-CM: 276.7   6/12/2021 - Present           Encounter for cholecystectomy ICD-10-CM: Z76.89   ICD-9-CM: V65.8   5/6/2021 - Present     Overview Signed 5/6/2021  9:13 AM by Jamee Pena MD          7/2020                  Acute left-sided low back pain without sciatica ICD-10-CM: M54.5   ICD-9-CM: 477. 2   5/6/2021 - Present           Atrial fibrillation (HCC) ICD-10-CM: I48.91   ICD-9-CM: 427.31   5/6/2021 - Present           Chronic anticoagulation ICD-10-CM: Z79.01   ICD-9-CM: V58.61   5/6/2021 - Present           Stomatitis ICD-10-CM: K12.1   ICD-9-CM: 528.00   3/2/2021 - Present           Thrush of mouth and esophagus (HCC) ICD-10-CM: B37.81, B37.0   ICD-9-CM: 112.84, 112.0   3/2/2021 - Present           ESRD (end stage renal disease) [facility] ([facility] ICD-10-CM: N18.6, Z99.2   ICD-9-CM: 585.6, V45.11   12/28/2020 - Present           History of DVT (deep vein thrombosis) ICD-10-CM: S21.705   ICD-9-CM: V12.51   12/28/2020 - Present           Hypothyroidism ICD-10-CM: E03.9   ICD-9-CM: 244. 9   12/24/2020 - Present           Vertigo ICD-10-CM: R42   ICD-9-CM: 780. 4   12/24/2020 - Present           Calculus of gallbladder with acute cholecystitis without obstruction ICD-10-CM: K80.00   ICD-9-CM: 574.00   10/9/2020 - Present           Acute recurrent maxillary sinusitis ICD-10-CM: J01.01   ICD-9-CM: 461.0   8/6/2020 - Present           Ulcer ICD-10-CM: [id number]   ICD-9-CM: 304. 9   Unknown - Present           Obesity ICD-10-CM: E66.9   ICD-9-CM: 278.00   Unknown - Present           Migraine ICD-10-CM: K23.568   ICD-9-CM: 346.90   Unknown - Present           Hypertension ICD-10-CM: I10   ICD-9-CM: 401.9   Unknown - Present           Hypercholesteremia ICD-10-CM: E78.00   ICD-9-CM: 272.0   Unknown - Present           GERD (gastroesophageal reflux disease) ICD-10-CM: K21.9   ICD-9-CM: 530.81   Unknown - Present           Burning with urination ICD-10-CM: R30.0   ICD-9-CM: 643. 1   Unknown - Present     Overview Signed 2/13/2017 12:02 PM by Chente LLANOS          frequent uti                  Arrhythmia ICD-10-CM: I49.9   ICD-9-CM: 427.9   Unknown - Present           Hyperkalemia ICD-10-CM: E87.5   ICD-9-CM: 276.7   11/13/2016 - Present           Pruritus ICD-10-CM: L29.9   ICD-9-CM: 833. 9   9/29/2016 - Present           Chronic kidney disease, stage III (moderate) (HCC) ICD-10-CM: N18.30   ICD-9-CM: 647. 3   9/27/2016 - Present           Recurrent urinary tract infection ICD-10-CM: N39.0   ICD-9-CM: 599.0   9/27/2016 - Present           Nausea and vomiting ICD-10-CM: R11.2   ICD-9-CM: 787.01   4/10/2016 - Present           Diverticulitis of intestine ICD-10-CM: K57.92   ICD-9-CM: 562.11   4/2/2016 - Present           Cystic disease of kidney ICD-10-CM: Q61.9   ICD-9-CM: 753.10   3/16/2015 - Present           Gastritis and duodenitis ICD-10-CM: K29.90   ICD-9-CM: 535.50   2/23/2015 - Present           Anemia ICD-10-CM: D64.9   ICD-9-CM: 285. 9   11/10/2014 - Present           Disease due to gram-negative bacillus ICD-10-CM: B96.89   ICD-9-CM: 041.85   10/17/2014 - Present     Overview Signed 2/13/2017 11:58 AM by Vielka Dutton, Helen LLANOS          Overview:    Acinetobacter baumannii Septicemia and UTI 10/14/2014 (cultures at [facility])                  Fever ICD-10-CM: R50.9   ICD-9-CM: 780.60   10/14/2014 - Present           Drug-induced hyperglycemia ICD-10-CM: R73.9, T50.905A   ICD-9-CM: 790.29, E947.9   6/28/2014 - Present           Exacerbation of asthma ICD-10-CM: J45.901   ICD-9-CM: 493.92   6/25/2014 - Present           Systemic inflammatory response syndrome (SIRS) (HCC) ICD-10-CM: R65.10   ICD-9-CM: 995.90   6/23/2014 - Present           Kidney transplant rejection ICD-10-CM: T86.11   ICD-9-CM: 996.81   4/10/2014 - Present     Overview Signed 2/13/2017 11:58 AM by Paola LLANOS          Overview:    Collected: Connor Wong Dr: Kannan Mckee MD       Case Number: [id number]   [address] [place] [address]       Case Number: [id number]       DIAGNOSIS:   ----------   ALLOGRAFT KIDNEY, NEEDLE BIOPSY (14 YEARS, 2 MONTHS):   - SUBOPTIMAL SPECIMEN: RENAL MEDULLA, INCLUDING DEEP MEDULLA WITH   FOCAL BENIGN UROTHELIAL EPITHELIUM. - MILD NEUTROPHILIC TUBULITIS WITH INTRALUMINAL NEUTROPHILS,   CORRELATE WITH URINE CULTURE TO RULE OUT BACTERIAL INFECTION. - MILD LYMPHOCYTIC TUBULITIS CONSISTENT WITH BORDERLINE CHANGE:   (\"SUSPICIOUS\" FOR ACUTE CELLULAR REJECTION) AS PER BANFF SCHEMA. - CONGESTED PERITUBULAR CAPILLARIES (NON-SPECIFIC), BUT RENAL   VEIN THROMBOSIS OR STENOSIS SHOULD BE EXCLUDED CLINICALLY. - FOCAL SMALL INTERSTITIAL COLLECTION OF CAST MATERIAL   (NON-SPECIFIC), BUT   URINARY OBSTRUCTION SHOULD BE EXCLUDED CLINICALLY. - NO DEFINITIVE STAINING FOR POLYOMAVIRUSES (SEE DESCRIPTION). - FOCAL C4D REACTIVITY ALONG PERITUBULAR CAPILLARY WALLS WITH   HIGH NON-SPECIFIC BACKGROUND STAINING; SIGNIFICANCE UNCERTAIN AND   CORRELATION WITH FLOW PRA IS SUGGESTED TO EXCLUDE EARLY HUMORAL   REJECTION.    - SMALL VESSEL SCLEROSIS, MILD TO MODERATE TO SEVERE.   - TUBULAR ATROPHY AND INTERSTITIAL FIBROSIS CANNOT BE ADEQUATELY   ASSESSED   IN THE ABSENCE OF RENAL CORTEX.       Re-Bx - Collected: Montez Wood Dr: Jeny Lindo MD       Case Number: [id number]   [address] [place] [address]       Case Number: [id number]       DIAGNOSIS:   ----------   ALLOGRAFT KIDNEY, NEEDLE BIOPSY (14 YEARS, 2 MONTHS):   - BORDERLINE CHANGE (\"SUSPICIOUS\" FOR ACUTE CELLULAR REJECTION)   TO FOCAL   MILD ACUTE CELLULAR REJECTION (BANFF TYPE 1A) (SEE COMMENT). - MILD NEUTROPHILIC INTERSTITIAL INFLAMMATION, CORRELATE WITH   URINE CULTURE   TO RULE OUT SUPERIMPOSED BACTERIAL INFECTION. - SMALL INTERSTITIAL COLLECTIONS OF PAS-POSITIVE CAST MATERIAL   AND CYSTICALLY DILATED PACKER'S SPACE WITH PAS-POSITIVE   PROTEINACEOUS FILTRATE (SEE   COMMENT). - ACUTE ISCHEMIC-TYPE TUBULAR INJURY IS NOTED.   - FOCAL C4D REACTIVITY ALONG PERITUBULAR CAPILLARY BERRY;   SIGNIFICANCE   UNCERTAIN AND CORRELATION WITH FLOW PRA IS SUGGESTED TO EXCLUDE   EARLY   HUMORAL REJECTION. - NEGATIVE IMMUNOSTAIN FOR POLYOMAVIRUSES (BK, HOMER). - CHRONIC CHANGES: GLOBAL SCLEROSIS IN APPROXIMATELY 14 OUT OF 60   (23%)   GLOMERULI, FOCAL SEGMENTAL GLOMERULOSCLEROSIS IN APPROXIMATELY   2 OUT OF   46 (4%) NON-OBSOLESCENT GLOMERULI, MODERATE TO SEVERE   ARTERIOSCLEROSIS,   MILD TO MODERATE TO SEVERE ARTERIOLAR HYALINE SCLEROSIS, AND   MODERATE   INTERSTITIAL FIBROSIS/TUBULAR ATROPHY (SEE COMMENT).                Acute renal failure ([facility] ICD-10-CM: N17.9   ICD-9-CM: 584. 9   4/7/2014 - Present           Renal transplant disorder ICD-10-CM: T86.10   ICD-9-CM: 996.81   4/7/2014 - Present           Systemic infection (HCC) ICD-10-CM: A41.9   ICD-9-CM: 423. 9   1/7/2014 - Present           Asthma ICD-10-CM: J45.909   ICD-9-CM: 493.90   8/21/2013 - Present           Chronic obstructive pulmonary disease ([facility] ICD-10-CM: J44.9   ICD-9-CM: 496   8/21/2013 - Present           Diverticular disease of large intestine ICD-10-CM: K57.30 ICD-9-CM: 562.10   8/21/2013 - Present           Essential hypertension ICD-10-CM: I10   ICD-9-CM: 675. 9   8/21/2013 - Present           Seasonal allergic rhinitis due to pollen ICD-10-CM: J30.1   ICD-9-CM: 477.0   8/21/2013 - Present           Hyperlipidemia ICD-10-CM: E78.5   ICD-9-CM: 372. 4   8/21/2013 - Present           UTI (urinary tract infection) ICD-10-CM: N39.0   ICD-9-CM: 599.0   8/21/2013 - Present           Fecal incontinence ICD-10-CM: R15.9   ICD-9-CM: 787.60   4/17/2013 - Present           History of kidney transplant ICD-10-CM: Z94.0   ICD-9-CM: V42.0   4/30/2012 - Present           CMV (cytomegalovirus) antibody positive ICD-10-CM: R76.8   ICD-9-CM: 795.79   4/30/2012 - Present     Overview Signed 2/13/2017 11:58 AM by Nicole LLANOS          Overview:    Donor negative                  Hematuria ICD-10-CM: R31.9   ICD-9-CM: 599.70   4/30/2012 - Present           Migraine without status migrainosus, not intractable ICD-10-CM: H73.755   ICD-9-CM: 346.90   2/24/2010 - Present           Renal cyst ICD-10-CM: N28.1   ICD-9-CM: 753.10   1/1/2002 - Present     Overview Signed 2/13/2017 12:01 PM by Nicole LLANOS          kidney transplant                   RESOLVED: Gastroesophageal reflux disease ICD-10-CM: K21.9   ICD-9-CM: 530.81   8/21/2013 - 8/3/2021           RESOLVED: Adiposity ICD-10-CM: E66.9   ICD-9-CM: 278.00   2/1/2010 - 8/3/2021                           Anemia, Iron Deficiency or Unspecified - Care Day 1 (8/11/2021) by Julee Willard RN       Review Entered Review Status   8/13/2021 14:12 Completed      Criteria Review      Care Day: 1 Care Date: 8/11/2021 Level of Care: Inpatient Floor    Guideline Day 1    Level Of Care    (X) ICU or floor    8/13/2021 14:12:47 EDT by Jet Ford      Floor    Clinical Status    (X) * Clinical Indications met    8/13/2021 14:12:47 EDT by Jet Fodr      60 y/o admitted with anemia    Routes    (X) Possible IV fluids    8/13/2021 14:12:47 EDT by Carmen Delong      NS IVF    Interventions    (X) Laboratory tests    8/13/2021 14:12:47 EDT by Carmen Delong      Results for Elaine PAZ (MRN [id number]) as of 8/13/2021 14:07    8/11/2021 05:15  HGB: 6.0 (L)  HCT: 19.7 (L)    * Milestone   Additional Notes   Results for Lacho Nielson (MRN [id number]) as of 8/13/2021 14:07      8/11/2021 05:15   Sodium: 135   Potassium: 4.1   Chloride: 105   CO2: 23   Anion gap: 7   Glucose: 99   BUN: 27 (H)   Creatinine: 9.50 (H)   BUN/Creatinine ratio: 3   Calcium: 7.8 (L)   GFR est non-AA: 4   GFR est AA: 5         Assessment & Plan:           Intractable vomiting   -Continue with Phenergan Zofran   -dc ivf due to esrd,   - all symptoms much improved       Gastritis and duodenitis   -cont levo and flagyl for previously diagnosed Acute Diverticultis on recnet admission       Essential hypertension   -controlled       Anemia, macrocytic   -Etiology likely due to chronic disease   -Patient had previously received Epogen injections   -Type and screen 2 units PRBCs   - b12 and folate checked 6/21, wnl   - pt accepts transfusioin, 1 unit due to hgb 6.0, with symtpoms of extreme lethargy   - check occult stool   - suspect some component is due to bleeding from Right femoral catheter that was placed in ed, nursing reports intermiteent bleeding yesteday       ESRD (end stage renal disease) on dialysis [facility])   -Nephrology consultation   - hemodialysis today, prefer transfusion at hd       Hx dvt and afib   - resume coumadin when confirm no active bleeding   - inr currently 2.0               Hospital Problems Date Reviewed: 7/27/2021     Codes Class Noted POA     * (Principal) Intractable vomiting ICD-10-CM: R11.10   ICD-9-CM: 536.2   8/9/2021 Unknown           ESRD (end stage renal disease) on dialysis [facility]) ICD-10-CM: N18.6, Z99.2   ICD-9-CM: 585.6, V45.11   12/28/2020 Yes           Gastritis and duodenitis ICD-10-CM: K29.90   ICD-9-CM: 535.50   2/23/2015 Yes         Anemia ICD-10-CM: D64.9   ICD-9-CM: 857. 9   11/10/2014 Yes           Essential hypertension ICD-10-CM: I10   ICD-9-CM: 594. 9   8/21/2013 Yes                         Review of Systems:   A comprehensive review of systems was negative except for that written in the HPI.            Vital Signs:    Last 24hrs VS reviewed since prior progress note. Most recent are:   Visit Vitals   BP (!) 134/52   Pulse 77   Temp 98.3 °F (36.8 °C)   Resp 18   Ht 5' 1\" (1.549 m)   Wt 67 kg (147 lb 12.8 oz)   SpO2 99%   BMI 27.93 kg/m²               Intake/Output Summary (Last 24 hours) at 8/11/2021 1014   Last data filed at 8/11/2021 9042   Gross per 24 hour   Intake 2865 ml   Output -   Net 2865 ml           Physical Examination:           General:          Alert, cooperative, no distress, appears stated age.   + pale   HEENT:           Atraumatic, anicteric sclerae, pink conjunctivae                           No oral ulcers, mucosa moist, throat clear, dentition fair   Neck:               Supple, symmetrical   Lungs:             Clear to auscultation bilaterally.  No Wheezing or Rhonchi. No rales. Chest wall:      No tenderness  No Accessory muscle use. Heart:              Regular  rhythm,  No  murmur   No edema   Abdomen:        Soft, non-tender. Not distended.  Bowel sounds normal   Extremities:     No cyanosis.  No clubbing,                             Skin turgor normal, Capillary refill normal   Skin:                Not pale.  Not Jaundiced  No rashes    Psych:             Not anxious or agitated.    Neurologic:      Alert, moves all extremities, answers questions appropriately and responds to commands            Data Review:    Review and/or order of clinical lab test   Review and/or order of tests in the radiology section of CPT   Review and/or order of tests in the medicine section of CPT           Labs:       Recent Labs     08/11/21   0515 08/10/21   0450   WBC 4.8 5.0   HGB 6.0* 7.5*   HCT 19.7* 24.4*    308       Recent Labs     08/11/21   0515 08/10/21   0450 08/09/21   1845    136 134*   K 4.1 4.1 3.5    99 96   CO2 23 25 25   BUN 27* 22* 23*   CREA 9.50* 9.20* 8.80*   GLU 99 93 67*   CA 7.8* 8.1* 8.6   MG -- -- 2.1       Recent Labs     08/09/21   1845   ALT 8   AP 76   TBILI 0.9   TP 7.1   ALB 3.5   GLOB 3.6   LPSE 32       Recent Labs     08/11/21   0850   INR 2.0*   PTP 21.8*       No results for input(s): FE, TIBC, PSAT, FERR in the last 72 hours. Lab Results   Component Value Date/Time     Folate 23.4 (H) 06/13/2021 10:45 AM       No results for input(s): PH, PCO2, PO2 in the last 72 hours.    Recent Labs     08/09/21   1845   TROIQ <0.02*       No results found for: CHOL, CHOLX, CHLST, CHOLV, HDL, HDLP, LDL, LDLC, DLDLP, TGLX, TRIGL, TRIGP, CHHD, CHHDX   Lab Results   Component Value Date/Time     Glucose (POC) 84 08/10/2021 04:12 AM       Lab Results   Component Value Date/Time     Color Yellow/Straw 06/12/2021 06:45 AM     Appearance Clear 06/12/2021 06:45 AM     Specific gravity 1.010 06/12/2021 06:45 AM     pH (UA) 7.0 06/12/2021 06:45 AM     Protein 100 (A) 06/12/2021 06:45 AM     Glucose Negative 06/12/2021 06:45 AM     Ketone Negative 06/12/2021 06:45 AM     Bilirubin Negative 06/12/2021 06:45 AM     Urobilinogen 0.2 06/12/2021 06:45 AM     Nitrites Negative 06/12/2021 06:45 AM     Leukocyte Esterase Negative 06/12/2021 06:45 AM     Epithelial cells Few 06/12/2021 06:45 AM     Bacteria 2+ (A) 06/12/2021 06:45 AM     WBC 0-4 06/12/2021 06:45 AM     RBC 0-5 06/12/2021 06:45 AM               Medications Reviewed:       Current Facility-Administered Medications   Medication Dose Route Frequency   · 0.9% sodium chloride infusion 250 mL 250 mL IntraVENous PRN   · alum-mag hydroxide-simeth (MYLANTA) oral suspension 10 mL 10 mL Oral Q6H PRN   · amiodarone (CORDARONE) tablet 200 mg 200 mg Oral DAILY   · calcitRIOL (ROCALTROL) capsule 0.25 mcg 0.25 mcg Oral DAILY   · carvediloL (COREG) tablet 6.25 mg 6.25 mg Oral BID WITH MEALS   · dicyclomine (BENTYL) capsule 10 mg 10 mg Oral QID   · [START ON 8/12/2021] estradioL (ESTRACE) 0.01 % (0.1 mg/gram) vaginal cream 2 g (Patient Supplied) 2 g Vaginal EVERY MON & TH   · fluticasone propionate (FLONASE) 50 mcg/actuation nasal spray 1 Spray 1 Spray Both Nostrils DAILY   · hyoscyamine sulfate (CYSTOSPAZ) rapid dissolve tablet 0.125 mg 0.125 mg SubLINGual Q4H PRN   · levothyroxine (SYNTHROID) tablet 50 mcg 50 mcg Oral 6am   · meclizine (ANTIVERT) tablet 25 mg 25 mg Oral Q6H PRN   · montelukast (SINGULAIR) tablet 10 mg 10 mg Oral DAILY   · pantoprazole (PROTONIX) tablet 40 mg 40 mg Oral DAILY   · predniSONE (DELTASONE) tablet 5 mg 5 mg Oral DAILY WITH BREAKFAST   · B complex-vitamin C-folic acid (NEPHRO-RACHID) 0.8 mg tab 1 Tablet Oral DAILY   · sertraline (ZOLOFT) tablet 50 mg 50 mg Oral DAILY   · atorvastatin (LIPITOR) tablet 10 mg 10 mg Oral QHS   · sucralfate (CARAFATE) tablet 1 g 1 g Oral AC&HS   · valGANciclovir (VALCYTE) tablet 900 mg (Patient Supplied) 900 mg Oral DAILY   · budesonide-formoteroL (SYMBICORT) 160-4.5 mcg/actuation HFA inhaler 1 Puff 1 Puff Inhalation BID RT   · zolpidem (AMBIEN) tablet 5 mg 5 mg Oral QHS PRN   · sodium chloride (NS) flush 5-40 mL 5-40 mL IntraVENous Q8H   · sodium chloride (NS) flush 5-40 mL 5-40 mL IntraVENous PRN   · acetaminophen (TYLENOL) tablet 650 mg 650 mg Oral Q6H PRN     Or   · acetaminophen (TYLENOL) suppository 650 mg 650 mg Rectal Q6H PRN   · polyethylene glycol (MIRALAX) packet 17 g 17 g Oral DAILY PRN   · promethazine (PHENERGAN) tablet 12.5 mg 12.5 mg Oral Q6H PRN     Or   · ondansetron (ZOFRAN) injection 4 mg 4 mg IntraVENous Q6H PRN   · heparin (porcine) injection 5,000 Units 5,000 Units SubCUTAneous Q8H   · oxyCODONE-acetaminophen (PERCOCET) 5-325 mg per tablet 1 Tablet 1 Tablet Oral Q6H PRN   · 0.9% sodium chloride infusion 250 mL 250 mL IntraVENous PRN   · albuterol (PROVENTIL VENTOLIN) nebulizer solution 2.5 mg 2.5 mg Nebulization Q2H PRN   · hydrALAZINE (APRESOLINE) 20 mg/mL injection 10 mg 10 mg IntraVENous Q6H PRN   · levoFLOXacin (LEVAQUIN) 250 mg in D5W IVPB 250 mg IntraVENous Q48H   · metroNIDAZOLE (FLAGYL) tablet 500 mg 500 mg Oral Q12H   · sevelamer carbonate (RENVELA) tab 800 mg 800 mg Oral TID WITH MEALS          Medications,   atorvastatin (LIPITOR) tablet 10 mg    Dose: 10 mg   Freq: EVERY BEDTIME Route: PO   Start: 08/10/21 0400 End: 08/13/21 1202      budesonide-formoteroL (SYMBICORT) 160-4.5 mcg/actuation HFA inhaler 1 Puff    Dose: 1 Puff   Freq: 2 TIMES DAILY RESP Route: IN   Start: 08/10/21 0900 End: 08/13/21 1202    Admin Instructions:   Sub for ADVAIR 250/50- 1 PUFF INH BID      carvediloL (COREG) tablet 6.25 mg    Dose: 6.25 mg   Freq: 2 TIMES DAILY WITH MEALS Route: PO   Start: 08/10/21 0800 End: 08/13/21 1202    Admin Instructions:   GIVE WITH FOOD, MILK, OR ANTACID      dextrose 5% and 0.9% NaCl infusion    Rate: 100 mL/hr Dose: 100 mL/hr   Freq: CONTINUOUS Route: IV   Start: 08/10/21 0400 End: 08/11/21 0753      dicyclomine (BENTYL) capsule 10 mg    Dose: 10 mg   Freq: 4 TIMES DAILY Route: PO   Start: 08/10/21 0900 End: 08/13/21 1202      metroNIDAZOLE (FLAGYL) tablet 500 mg    Dose: 500 mg   Freq: EVERY 12 HOURS Route: PO x1   Start: 08/10/21 0900 End: 08/13/21 1202      Or   ondansetron (ZOFRAN) injection 4 mg    Dose: 4 mg   Freq: EVERY 6 HOURS AS NEEDED Route: IV   PRN Reason: Nausea or Vomiting   Start: 08/10/21 0320 End: 08/13/21 1202      sucralfate (CARAFATE) tablet 1 g    Dose: 1 g   Freq: 4 TIMES DAILY BEFORE MEALS & NIGHTLY Route: PO   Start: 08/10/21 1130 End: 08/13/21 1202    Admin Instructions:               Anemia, Iron Deficiency or Unspecified - Clinical Indications for Admission to Inpatient Care by Eli Sagastume RN       Review Entered Review Status   8/13/2021 14:09 Completed      Criteria Review      Clinical Indications for Admission to Inpatient Care    Most Recent : Scott Valdez Most Recent Date: 8/13/2021 14:09:50 EDT    (X) Admission is indicated for  1 or more  of the following  (1) (2) (3) (4) (5) (6) (7):       (X) Clinically significant signs or symptoms that are severe or persist despite observation care       (eg, transfusion, volume replacement), as indicated by  1 or more  of the following :          (X) Other severe sign or symptom secondary to anemia          8/13/2021 14:09:50 EDT by Scott Valdez            with symtpoms of extreme lethargy

## 2021-08-13 NOTE — PROGRESS NOTES
7878- Bedside shift report completed, care of the patient assumed, bed in lowest position, call bell within reach. 0750- Central line to right groin removed in preparation for discharge. Patient tolerated well    0800- Daughter, Yeison Cisneros, called regarding patient's discharge    0830- verbalized understanding of discharge instructions and follow up appointments, patient would like to take morning medications at home.     2116- Patient taken downstairs by CNA to be taken home by daughter

## 2021-08-15 RX ORDER — WARFARIN 2 MG/1
TABLET ORAL
Qty: 30 TABLET | Refills: 0 | OUTPATIENT
Start: 2021-08-15 | End: 2021-09-14

## 2021-08-16 ENCOUNTER — PATIENT OUTREACH (OUTPATIENT)
Dept: CASE MANAGEMENT | Age: 66
End: 2021-08-16

## 2021-08-16 NOTE — PROGRESS NOTES
Care Transitions Initial Call    Call within 2 business days of discharge: Yes     Patient: Scot Delong Patient : 1955 MRN: [id number]    Last Discharge [address]       Complaint Diagnosis Description Type Department Provider    21 Fatigue; Diarrhea Intractable vomiting with nausea, unspecified vomiting type ED to Hosp-Admission (Discharged) (ADMIT) [facility] Wayne Cheung MD; Librado Bloom... Was this an external facility discharge? No Discharge Facility: [facility]    Call placed to patient at prefered number. Attempted to reach patient for hospital follow up . No answer and there was no way to leave a message because mailbox full.      [facility] follow up appointment(s):   Future Appointments   Date Time Provider Elena Coello   2021  2:10 PM MD CHARLES Santos BS AMB   2021  1:30 PM Glory Donnelly MD [facility] BS AMB

## 2021-08-17 ENCOUNTER — PATIENT OUTREACH (OUTPATIENT)
Dept: CASE MANAGEMENT | Age: 66
End: 2021-08-17

## 2021-08-17 ENCOUNTER — OFFICE VISIT (OUTPATIENT)
Dept: ORTHOPEDIC SURGERY | Age: 66
End: 2021-08-17

## 2021-08-17 VITALS — BODY MASS INDEX: 28.32 KG/M2 | WEIGHT: 150 LBS | HEIGHT: 61 IN

## 2021-08-17 DIAGNOSIS — M25.511 RIGHT SHOULDER PAIN, UNSPECIFIED CHRONICITY: Primary | ICD-10-CM

## 2021-08-17 DIAGNOSIS — M75.51 BURSITIS OF RIGHT SHOULDER: ICD-10-CM

## 2021-08-17 DIAGNOSIS — M17.12 OSTEOARTHRITIS OF LEFT KNEE, UNSPECIFIED OSTEOARTHRITIS TYPE: ICD-10-CM

## 2021-08-17 DIAGNOSIS — M25.562 LEFT KNEE PAIN, UNSPECIFIED CHRONICITY: ICD-10-CM

## 2021-08-17 PROCEDURE — G9231 DOC ESRD DIA TRANS PREG: HCPCS | Performed by: ORTHOPAEDIC SURGERY

## 2021-08-17 PROCEDURE — 20611 DRAIN/INJ JOINT/BURSA W/US: CPT | Performed by: ORTHOPAEDIC SURGERY

## 2021-08-17 PROCEDURE — 99203 OFFICE O/P NEW LOW 30 MIN: CPT | Performed by: ORTHOPAEDIC SURGERY

## 2021-08-17 PROCEDURE — G8510 SCR DEP NEG, NO PLAN REQD: HCPCS | Performed by: ORTHOPAEDIC SURGERY

## 2021-08-17 PROCEDURE — G8419 CALC BMI OUT NRM PARAM NOF/U: HCPCS | Performed by: ORTHOPAEDIC SURGERY

## 2021-08-17 PROCEDURE — 3017F COLORECTAL CA SCREEN DOC REV: CPT | Performed by: ORTHOPAEDIC SURGERY

## 2021-08-17 PROCEDURE — G8427 DOCREV CUR MEDS BY ELIG CLIN: HCPCS | Performed by: ORTHOPAEDIC SURGERY

## 2021-08-17 PROCEDURE — 1111F DSCHRG MED/CURRENT MED MERGE: CPT | Performed by: ORTHOPAEDIC SURGERY

## 2021-08-17 PROCEDURE — G9899 SCRN MAM PERF RSLTS DOC: HCPCS | Performed by: ORTHOPAEDIC SURGERY

## 2021-08-17 RX ORDER — TRIAMCINOLONE ACETONIDE 40 MG/ML
40 INJECTION, SUSPENSION INTRA-ARTICULAR; INTRAMUSCULAR ONCE
Status: COMPLETED | OUTPATIENT
Start: 2021-08-17 | End: 2021-08-17

## 2021-08-17 RX ORDER — LIDOCAINE HYDROCHLORIDE 10 MG/ML
9 INJECTION INFILTRATION; PERINEURAL ONCE
Status: COMPLETED | OUTPATIENT
Start: 2021-08-17 | End: 2021-08-17

## 2021-08-17 RX ADMIN — LIDOCAINE HYDROCHLORIDE 9 ML: 10 INJECTION INFILTRATION; PERINEURAL at 14:29

## 2021-08-17 RX ADMIN — TRIAMCINOLONE ACETONIDE 40 MG: 40 INJECTION, SUSPENSION INTRA-ARTICULAR; INTRAMUSCULAR at 14:30

## 2021-08-17 RX ADMIN — TRIAMCINOLONE ACETONIDE 40 MG: 40 INJECTION, SUSPENSION INTRA-ARTICULAR; INTRAMUSCULAR at 14:29

## 2021-08-17 NOTE — PROGRESS NOTES
Care Transitions Initial Call    Call within 2 business days of discharge: Yes     Patient: Steven Hendrix Patient : 1955 MRN: [id number]    Last Discharge [address]       Complaint Diagnosis Description Type Department Provider    21 Fatigue; Diarrhea Intractable vomiting with nausea, unspecified vomiting type ED to Hosp-Admission (Discharged) (ADMIT) [facility] Tomeka De La Torre MD; Syl Rodriguez... Was this an external facility discharge? No Discharge Facility: [facility]    Call placed to patient at prefered number. Attempted to reach patient for hospital follow up . No answer and there was no way to leave a message because mailbox full. CAll placed to mobile number on file. Message left introducing myself, the purpose of the call and giving my contact information. Requested that patient call back at their earliest convenience.   [address] follow up appointment(s):   Future Appointments   Date Time Provider Elena Coello   2021  1:30 PM Geneva Councilman, MD [facility] BS AMB   2021  2:40 PM Piper Garcia MD [facility] BS AMB

## 2021-08-17 NOTE — PROGRESS NOTES
Name: Hanane Escoto    : 1955     Service Dept: [facility]    Patient's Pharmacies:    [facility] [address], [address] [facility]  [address] [address]  Phone: [phone] Fax: [phone] OF [address], [address] [address]  Phone: [phone] Fax: [phone]       Chief Complaint   Patient presents with    Arm Pain    Shoulder Pain    Leg Pain        Visit Vitals  Ht 5' 1\" (1.549 m)   Wt 150 lb (68 kg)   BMI 28.34 kg/m²      Allergies   Allergen Reactions    Aspirin Rash and Unknown (comments)    Bactrim [Sulfamethoxazole-Trimethoprim] Rash and Unknown (comments)    Bromfenac Rash and Unknown (comments)    Cefepime Other (comments)     Neurological reaction    Ceftriaxone Rash    Copper Rash    Ibuprofen Rash and Unknown (comments)    Ketorolac Tromethamine Rash and Unknown (comments)    Morphine Rash and Unknown (comments)    Relafen [Nabumetone] Rash and Unknown (comments)    Rifampin Rash and Unknown (comments)    Vancomycin Rash and Unknown (comments)     Pt reports causes itching, takes benadryl prior to use. Pt denies anaphylaxis. Requires benadryl with each vancomycin dose      Current Outpatient Medications   Medication Sig Dispense Refill    warfarin (COUMADIN) 2 mg tablet TAKE 3 TABLETS BY MOUTH EVERY DAY 30 Tablet 0    calcitRIOL (ROCALTROL) 0.25 mcg capsule Take 1 Capsule by mouth as directed. Take on non dialysis days      doxercalciferol (HECTOROL IV) 14 mcg by IntraVENous route two (2) days a week.  lidocaine-prilocaine (EMLA) topical cream as directed.  methoxy peg-epoetin beta (MIRCERA INJECTION) 100 mcg.  predniSONE (DELTASONE) 5 mg tablet Take 5 mg by mouth daily.  warfarin sodium (WARFARIN PO) Take  by mouth.  Patient unclear on dose, last recorded fill in June      ondansetron (Zofran ODT) 4 mg disintegrating tablet Take 1 Tablet by mouth every eight (8) hours as needed for Nausea. 14 Tablet 0    Wixela Inhub 250-50 mcg/dose diskus inhaler INHALE 1 PUFF BY MOUTH EVERY 12 HOURS 60 Each 3    RenaPlex-D 800 mcg-12.5 mg -2,000 unit tab Take 1 Tablet by mouth daily.  sevelamer carbonate (RENVELA) 800 mg tab tab Take 800 mg by mouth three (3) times daily.  zolpidem (AMBIEN) 5 mg tablet Take 5 mg by mouth nightly as needed.  levothyroxine (SYNTHROID) 50 mcg tablet TAKE 1 TABLET BY MOUTH EVERY DAY 90 Tab 3    meclizine (ANTIVERT) 25 mg tablet TAKE 1 TABLET BY MOUTH THREE TIMES DAILY FOR UP TO 10 DAYS AS NEEDED FOR DIZZINESS 21 Tab 0    dicyclomine (BENTYL) 10 mg capsule TAKE 1 CAPSULE BY MOUTH FOUR TIMES DAILY 120 Cap 1    sucralfate (CARAFATE) 1 gram tablet TAKE 1 TABLET BY MOUTH FOUR TIMES DAILY 360 Tab 0    carvediloL (COREG) 6.25 mg tablet Take 1 Tab by mouth two (2) times daily (with meals). 180 Tab 2    valGANciclovir (VALCYTE) 450 mg tablet Take 2 Tabs by mouth daily. 90 Tab 5    simvastatin (ZOCOR) 20 mg tablet TAKE 1 TABLET BY MOUTH DAILY AS DIRECTED. 90 Tab 1    sertraline (ZOLOFT) 50 mg tablet Take 1 Tab by mouth daily. 90 Tab 0    albuterol (PROVENTIL VENTOLIN) 2.5 mg /3 mL (0.083 %) nebu USE 1 VIAL VIA NEBULIZER THREE TIMES DAILY 90 mL 3    amiodarone (CORDARONE) 200 mg tablet TAKE 1 TABLET BY MOUTH EVERY DAY 90 Tab 2    Dexilant 60 mg CpDB capsule (delayed release) TAKE ONE CAPSULE BY MOUTH EVERY DAY 30 Cap 5    aluminum & magnesium hydroxide-simethicone (Maalox Maximum Strength) 400-400-40 mg/5 mL suspension Take 10 mL by mouth every six (6) hours as needed for Indigestion. 250 mL 0    ESTRACE 0.01 % (0.1 mg/gram) vaginal cream INSERT 2 GRAMS INTO VAGINA EVERY MONDAY AND THURSDAY 42.5 g 5    cranberry extract 425 mg cap 425 mg.  LORazepam (ATIVAN) 0.5 mg tablet Take 0.5 mg by mouth daily.       fluticasone (FLONASE) 50 mcg/actuation nasal spray 1 Spray.  EPINEPHrine (EPIPEN) 0.3 mg/0.3 mL injection 0.3 mg.      hyoscyamine SL (LEVSIN/SL) 0.125 mg SL tablet 0.125 mg by SubLINGual route every four (4) hours as needed.  montelukast (SINGULAIR) 10 mg tablet Take 10 mg by mouth daily. Patient Active Problem List   Diagnosis Code    History of kidney transplant Z94.0    CMV (cytomegalovirus) antibody positive R76.8    Hematuria R31.9    Fecal incontinence R15.9    Drug-induced hyperglycemia R73.9, T50.905A    Diverticulitis of intestine K57.92    Anemia D64.9    Acute renal failure (HCC) N17.9    Asthma J45.909    Exacerbation of asthma J45. 0    Chronic kidney disease, stage III (moderate) ([facility]) N18.30    Chronic obstructive pulmonary disease ([facility]) J44.9    Cystic disease of kidney Q61.9    Diverticular disease of large intestine K57.30    Essential hypertension I10    Fever R50.9    Gastritis and duodenitis K29.90    Seasonal allergic rhinitis due to pollen J30.1    Hyperlipidemia E78.5    Hyperkalemia E87.5    Disease due to gram-negative bacillus B96.89    Kidney transplant rejection T86.11    Migraine without status migrainosus, not intractable G43.909    Nausea and vomiting R11.2    Pruritus L29.9    Recurrent urinary tract infection N39.0    UTI (urinary tract infection) N39.0    Renal transplant disorder T86.10    Systemic infection ([facility]) A41.9    Systemic inflammatory response syndrome (SIRS) ([facility]) R65.10    Ulcer [id number]    Renal cyst N28.1    Obesity E66.9    Migraine G43.909    Hypertension I10    Hypercholesteremia E78.00    GERD (gastroesophageal reflux disease) K21.9    Burning with urination R30.0    Arrhythmia I49.9    Acute recurrent maxillary sinusitis J01.01    Calculus of gallbladder with acute cholecystitis without obstruction K80.00    Hypothyroidism E03.9    Vertigo R42    ESRD (end stage renal disease) on dialysis (HCC) N18.6, Z99.2    History of DVT (deep vein thrombosis) Z86.718    Stomatitis K12.1    Thrush of mouth and esophagus (HCC) B37.81, B37.0    Encounter for cholecystectomy Z76.89    Acute left-sided low back pain without sciatica M54.5    Atrial fibrillation (HCC) I48.91    Chronic anticoagulation Z79.01    Acute hyperkalemia E87.5    Left leg pain M79.605    Intractable vomiting R11.10      Family History   Problem Relation Age of Onset    Colon Polyps Brother     Cancer Mother     Diabetes Father       Social History     Socioeconomic History    Marital status:      Spouse name: Not on file    Number of children: Not on file    Years of education: Not on file    Highest education level: Not on file   Tobacco Use    Smoking status: Never Smoker    Smokeless tobacco: Never Used   Vaping Use    Vaping Use: Never used   Substance and Sexual Activity    Alcohol use: No    Drug use: No    Sexual activity: Not Currently     Social Determinants of Health     Financial Resource Strain:     Difficulty of Paying Living Expenses:    Food Insecurity:     Worried About Running Out of Food in the Last Year:     Ran Out of Food in the Last Year:    Transportation Needs:     Lack of Transportation (Medical):      Lack of Transportation (Non-Medical):    Physical Activity:     Days of Exercise per Week:     Minutes of Exercise per Session:    Stress:     Feeling of Stress :    Social Connections:     Frequency of Communication with Friends and Family:     Frequency of Social Gatherings with Friends and Family:     Attends Jainism Services:     Active Member of Clubs or Organizations:     Attends Club or Organization Meetings:     Marital Status:       Past Surgical History:   Procedure Laterality Date    COLONOSCOPY      Dr Josue Bailon  5yrs ago    ENDOSCOPY VISIT-OUTPATIENT      Dr Josue Bailon  5 yrs ago    ENDOSCOPY, COLON, DIAGNOSTIC      with Dr. Darwin Lerner HX CHOLECYSTECTOMY  02/2021    HX GI      ulcer    HX HEENT sinus surg    HX RENAL TRANSPLANT      HX TRANSPLANT      kidney transplant 2002    VASCULAR SURGERY PROCEDURE UNLIST      shunt in prep for dialysis      Past Medical History:   Diagnosis Date    Anemia NEC     Arrhythmia     Asthma     Asthma     Burning with urination     frequent uti    Chronic kidney disease     dialysis    CMV (cytomegalovirus) antibody positive     Dialysis patient ([facility] [address]     M/W/F    Dyspepsia and other specified disorders of function of stomach     stomach ulcer    Essential hypertension     GERD (gastroesophageal reflux disease)     Hypercholesteremia     Hypertension     Migraine     Obesity     Renal cyst 2002    kidney transplant     Ulcer         I have reviewed and agree with PFSH and ROS and intake form in chart and the record furthermore I have reviewed prior medical record(s) regarding this patients care during this appointment. Review of Systems:   Patient is a pleasant appearing individual, appropriately dressed, well hydrated, well nourished, who is alert, appropriately oriented for age, and in no acute distress with a normal gait and normal affect who does not appear to be in any significant pain. Physical Exam:  Right Shoulder - Grossly neurovascularly intact. Range of motion-Full passive, Active with impingement. No Point tenderness, Strength-weakness with abduction, some mild crepitation, No skin lesion are identified, No instabilty is noted, No apprehension. No Swelling. Left Shoulder - Grossly neurovascularly intact, Full Range of motion, No point tenderness, No weakness, No skin lesions, No Instability, No apprehension, No swelling. Procedure Documentation:    I discussed in detail the risks, benefits and complications of an injection which included but are not limited to infection, skin reactions, hot swollen joint, and anaphylaxis with the patient. The patient verbalized understanding and gave informed consent for the injection.  The patient's right shoulder were prepped using sterile alcohol solution. A sterile needle was inserted into the right shoulder and the mixture of 9 mL Lidocaine 1%, 1 mL Kenalog 40 mg was injected under sterile technique. The needle was withdrawn and the puncture site sealed with a Band-Aid. Technique: Under sterile conditions a [employer] ultrasound unit with a variable frequency (7.0-14.0 MHz) linear transducer was used to localize the placement of needle into the right joint. Findings: Successful needle placement for shoulder injection. Final images were taken and saved for permanent record. The patient tolerated the injection well. The patient was instructed to call the office immediately if there is any pain, redness, warmth, fever, or chills. Left Knee -Decrease range of motion with flexion, Knee arc of greater than 50 degrees, Some crepitation, Grossly neurovascularly intact, Good cap refill, No skin lesion, Moderate swelling, No gross instability, Some quadriceps weakness, Kellgren and Darell at least grade 3    Right Knee - Full Range of Motion, No crepitation, Grossly neurovascularly intact, Good cap refill, No skin lesion, No swelling, No gross instability, No quadriceps weakness    Procedure Documentation:    I discussed in detail the risks, benefits and complications of an injection which included but are not limited to infection, skin reactions, hot swollen joint, and anaphylaxis with the patient. The patient verbalized understanding and gave informed consent for the injection. The patient's knee was flexed to 90° and the skin prepped using sterile alcohol solution. A sterile needle was inserted into the left knee and the mixture of 9 mL Lidocaine 1%, 1 mL Kenalog 40 mg was injected under sterile technique. The needle was withdrawn and the puncture site sealed with a Band-Aid.       Technique: Under sterile conditions a [employer] ultrasound unit with a variable frequency (7.0-14.0 MHz) linear transducer was used to localize the placement of needle into the left knee joint. Findings: Successful needle placement for knee injection. Final images were taken and saved for permanent record. The patient tolerated the injection well. The patient was instructed to call the office immediately if there is any pain, redness, warmth, fever, or chills. Encounter Diagnoses     ICD-10-CM ICD-9-CM   1. Right shoulder pain, unspecified chronicity  M25.511 719.41   2. Bursitis of right shoulder  M75.51 726.10   3. Left knee pain, unspecified chronicity  M25.562 719.46   4. Osteoarthritis of left knee, unspecified osteoarthritis type  M17.12 715.96       HPI:  The patient is here with a chief complaint of left knee pain, right shoulder pain, throbbing, burning pain, progressively getting worse. Pain is 9/10. ROS:  10-point review of systems is positive for nighttime pain, weight loss and fatigue. X-rays of the left knee and right shoulder done in our office are unremarkable except for mild arthritis. Assessment/Plan:  1. Right shoulder bursitis and left knee osteoarthritic flare. Plan will be for cortisone injection for both. We will see the patient back in 1 week for routine followup and go from there. As part of continued conservative pain management options the patient was advised to utilize Tylenol or OTC NSAIDS as long as it is not medically contraindicated. Return to Office: Follow-up and Dispositions    · Return in about 1 week (around 8/24/2021).         Administrations This Visit     lidocaine (XYLOCAINE) 10 mg/mL (1 %) injection 9 mL     Admin Date  08/17/2021 Action  Given Dose  9 mL Route  Other Administered By  Radha Hawkins LPN    Admin Date  98/64/4888 Action  Given Dose  9 mL Route  Other Administered By  Radha Hawkins LPN          triamcinolone acetonide (KENALOG-40) 40 mg/mL injection 40 mg     Admin Date  08/17/2021 Action  Given Dose  40 mg Route  Intra artICUlar Administered By  Sharron Mims LPN           Admin Date  08/17/2021 Action  Given Dose  40 mg Route  Intra artICUlar Administered By  Sharron Mims LPN               Scribed by Monse Bunn LPN as dictated by [facility] ALIYAH Veliz MD.  Documentation True and Accepted Zeeshan Veliz MD

## 2021-08-17 NOTE — PATIENT INSTRUCTIONS
Knee Pain or Injury: Care Instructions  Your Care Instructions     Injuries are a common cause of knee problems. Sudden (acute) injuries may be caused by a direct blow to the knee. They can also be caused by abnormal twisting, bending, or falling on the knee. Pain, bruising, or swelling may be severe, and may start within minutes of the injury. Overuse is another cause of knee pain. Other causes are climbing stairs, kneeling, and other activities that use the knee. Everyday wear and tear, especially as you get older, also can cause knee pain. Rest, along with home treatment, often relieves pain and allows your knee to heal. If you have a serious knee injury, you may need tests and treatment. Follow-up care is a key part of your treatment and safety. Be sure to make and go to all appointments, and call your doctor if you are having problems. It's also a good idea to know your test results and keep a list of the medicines you take. How can you care for yourself at home? · Be safe with medicines. Read and follow all instructions on the label. ? If the doctor gave you a prescription medicine for pain, take it as prescribed. ? If you are not taking a prescription pain medicine, ask your doctor if you can take an over-the-counter medicine. · Rest and protect your knee. Take a break from any activity that may cause pain. · Put ice or a cold pack on your knee for 10 to 20 minutes at a time. Put a thin cloth between the ice and your skin. · Prop up a sore knee on a pillow when you ice it or anytime you sit or lie down for the next 3 days. Try to keep it above the level of your heart. This will help reduce swelling. · If your knee is not swollen, you can put moist heat, a heating pad, or a warm cloth on your knee. · If your doctor recommends an elastic bandage, sleeve, or other type of support for your knee, wear it as directed.   · Follow your doctor's instructions about how much weight you can put on your leg. Use a cane, crutches, or a walker as instructed. · Follow your doctor's instructions about activity during your healing process. If you can do mild exercise, slowly increase your activity. · Reach and stay at a healthy weight. Extra weight can strain the joints, especially the knees and hips, and make the pain worse. Losing even a few pounds may help. When should you call for help? Call 911 anytime you think you may need emergency care. For example, call if:    · You have symptoms of a blood clot in your lung (called a pulmonary embolism). These may include:  ? Sudden chest pain. ? Trouble breathing. ? Coughing up blood. Call your doctor now or seek immediate medical care if:    · You have severe or increasing pain.     · Your leg or foot turns cold or changes color.     · You cannot stand or put weight on your knee.     · Your knee looks twisted or bent out of shape.     · You cannot move your knee.     · You have signs of infection, such as:  ? Increased pain, swelling, warmth, or redness. ? Red streaks leading from the knee. ? Pus draining from a place on your knee. ? A fever.     · You have signs of a blood clot in your leg (called a deep vein thrombosis), such as:  ? Pain in your calf, back of the knee, thigh, or groin. ? Redness and swelling in your leg or groin. Watch closely for changes in your health, and be sure to contact your doctor if:    · You have tingling, weakness, or numbness in your knee.     · You have any new symptoms, such as swelling.     · You have bruises from a knee injury that last longer than 2 weeks.     · You do not get better as expected. Where can you learn more? Go to http://www.gray.com/  Enter K195 in the search box to learn more about \"Knee Pain or Injury: Care Instructions. \"  Current as of: February 26, 2020               Content Version: 12.8  © 9134-4088 [employer].    Care instructions adapted under license by [facility] (which disclaims liability or warranty for this information). If you have questions about a medical condition or this instruction, always ask your healthcare professional. [address] any warranty or liability for your use of this information. Shoulder Pain: Care Instructions  Your Care Instructions     You can hurt your shoulder by using it too much during an activity, such as fishing or baseball. It can also happen as part of the everyday wear and tear of getting older. Shoulder injuries can be slow to heal, but your shoulder should get better with time. Your doctor may recommend a sling to rest your shoulder. If you have injured your shoulder, you may need testing and treatment. Follow-up care is a key part of your treatment and safety. Be sure to make and go to all appointments, and call your doctor if you are having problems. It's also a good idea to know your test results and keep a list of the medicines you take. How can you care for yourself at home? · Take pain medicines exactly as directed. ? If the doctor gave you a prescription medicine for pain, take it as prescribed. ? If you are not taking a prescription pain medicine, ask your doctor if you can take an over-the-counter medicine. ? Do not take two or more pain medicines at the same time unless the doctor told you to. Many pain medicines contain acetaminophen, which is Tylenol. Too much acetaminophen (Tylenol) can be harmful. · If your doctor recommends that you wear a sling, use it as directed. Do not take it off before your doctor tells you to. · Put ice or a cold pack on the sore area for 10 to 20 minutes at a time. Put a thin cloth between the ice and your skin. · If there is no swelling, you can put moist heat, a heating pad, or a warm cloth on your shoulder. Some doctors suggest alternating between hot and cold. · Rest your shoulder for a few days.  If your doctor recommends it, you can then begin gentle exercise of the shoulder, but do not lift anything heavy. When should you call for help? Call 911 anytime you think you may need emergency care. For example, call if:    · You have chest pain or pressure. This may occur with:  ? Sweating. ? Shortness of breath. ? Nausea or vomiting. ? Pain that spreads from the chest to the neck, jaw, or one or both shoulders or arms. ? Dizziness or lightheadedness. ? A fast or uneven pulse. After calling 911, chew 1 adult-strength aspirin. Wait for an ambulance. Do not try to drive yourself.     · Your arm or hand is cool or pale or changes color. Call your doctor now or seek immediate medical care if:    · You have signs of infection, such as:  ? Increased pain, swelling, warmth, or redness in your shoulder. ? Red streaks leading from a place on your shoulder. ? Pus draining from an area of your shoulder. ? Swollen lymph nodes in your neck, armpits, or groin. ? A fever. Watch closely for changes in your health, and be sure to contact your doctor if:    · You cannot use your shoulder.     · Your shoulder does not get better as expected. Where can you learn more? Go to http://www.[employer].com/  Enter H996 in the search box to learn more about \"Shoulder Pain: Care Instructions. \"  Current as of: November 16, 2020               Content Version: 12.8  © 8274-0710 [employer]. Care instructions adapted under license by [employer] (which disclaims liability or warranty for this information). If you have questions about a medical condition or this instruction, always ask your healthcare professional. [place] [address] any warranty or liability for your use of this information.

## 2021-08-17 NOTE — LETTER
Gait: Norm  Limp  [employer]   Chair   [place] ESS:0/3/5234    Today's Date:8/17/2021      VBX:[id number]                                                Shoulder/Elbow    Est  2  3  4 New  2  3   Consult  3    Pre-op         Post-op     No LOS    Injection Utrasound     Injection NO Ultrasound    Medication  69681 L      58636       Cortisone  17119 M       90258                                                  SHOULDER  LT Pain  M25.512  RT Pain   M25.511     LT Frozen  M75.02  RT Frozen   M75.01    LT Partial RTC M75.112  RT Partial  RTC  M75.111    LT RTC tear  M75.122  RT RTC tear   M75.121    LT Bursitis  M75.52  RT Bursitis   M75.51    LT OA   M19.012  RT OA   M19.011     LT recurrent dislocation 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  RT recurrent dislocation  M24.411    LT SLAP  S43.432A  RT SLAP  S43.431A                                                           SHOULDER FX  Clavicle       80762  LT Clavicle    S42.025A RT Clavicle   S42.024A   Humerus     04699   LT 2part   S42.225A RT 2part     S42.224A   Supracondylar  48902 LT  S42.415A RT      S42.41                                                                  ELBOW  LT Pain                          M25.522  RT Pain    M25.521    LT Golfers elbow       M77.02  RT Golfers elbow   M77.01    LT lateral epicondylitis    M77.12  RT lateral epicondylitis M77.11    LT olecranon bursitis      M70.22  RT olecranon bursitis  M70.21   LT ulnar nerve palsy      G56.22  RT ulnar nerve palsy  G56.21                                                                   ELBOW FX  Olecranon FX       96237  LT Olecranon  S52.035A RT Olecranon   S52.034A           Radial head/neck  07868  LT Radial Head S52.125A RT Radial head  S52.124A                           LT Radial Neck  S52.135A RT Radial Neck S52.134A         Splinting  17999- Long Arm Splint  81868- Short Arm Splint

## 2021-08-17 NOTE — LETTER
Flores Nicolas   [birth year]   [id number]       8/17/2021       I hereby authorize and direct Zeeshan Tolliver MD, Deana Ojeda, and whomever he may designate as his associate to perform upon myself the following procedure:    Injection of: Kenalog, Supartz, Euflexxa, Orthovisc in the Right/Left ____________________. If any unforeseen condition arises in the course of the procedure, I further authorize him and his associated and/or assistant(s) to do whatever he/she deems advisable. The nature, purpose, benefits, risks, side effects, likelihood of achieving goals, and potential problems that might occur during recuperation, risks for not receiving the proposed care, treatment and services and alternatives of the procedure have been fully explained to me by my physician including, but not limited to:    Swelling, joint pain, skin pigment changes, worsening of condition, and failure to improve. I acknowledge that no guarantee or assurance has been made to me as to the results that may be obtained or the likelihood of success.                 _______________________________________     Signature of patient or authorized representative                [employer] [facility] fax: [phone]

## 2021-08-20 NOTE — PROGRESS NOTES
Physician Progress Note      PATIENT:               Rachael GARCIA #:                  [id number]  :                       1955  ADMIT DATE:       2021 5:46 PM  [address] [place] DATE:        2021 9:02 AM  RESPONDING  PROVIDER #:        Melody Coles MD          QUERY TEXT:    Dear Hospitalist,    Patient admitted to observation status on 21 for Gastritis. Noted inpatient admission order on  for Anemia. If possible, please document the reason for inpatient admission. The medical record reflects the following:  Risk Factors: PMH Anemia, Coumadin Rx    Clinical Indicators:  * Per  Inpatient Admission order: Admitting Diagnosis -Anemia  * Progress note : \"suspect some component is due to bleeding from Right femoral catheter that was placed in ed, nursing reports intermitent bleeding yesterday\"  * DC Summary: \"She will resume coumadin, and cont to follow with pcp and gi. Watch h/h.\"  * Hgb: -   - 10.0;  7.5;  6.0  * PT & INR   -  21.8 & 2.0    Treatment: Blood transfusion, serial PT, INR, CBCs ordered,    Thank you,  Abbey SOTOMAYORN, RN, CRCR  Please contact me for any questions or concerns regarding this query at [email or web address]  Options provided:  -- Inpatient admission for acute on chronic anemia; bleeding associated with Coumadin  -- Inpatient admission for anemia(please specify cause), Please specify cause.   -- Other - I will add my own diagnosis  -- Disagree - Not applicable / Not valid  -- Disagree - Clinically unable to determine / Unknown  -- Refer to Clinical Documentation Reviewer    PROVIDER RESPONSE TEXT:    This patient was admitted inpatient for anemia acute (bleeding from catheter site) on chronic (anemia of renal disease)    Query created by: Starla Burkitt on 2021 9:13 AM      Electronically signed by:  Melody Coles MD 2021 3:40 PM

## 2021-08-24 ENCOUNTER — OFFICE VISIT (OUTPATIENT)
Dept: ORTHOPEDIC SURGERY | Age: 66
End: 2021-08-24

## 2021-08-24 ENCOUNTER — OFFICE VISIT (OUTPATIENT)
Dept: FAMILY MEDICINE CLINIC | Age: 66
End: 2021-08-24
Payer: MEDICARE

## 2021-08-24 VITALS — OXYGEN SATURATION: 99 % | DIASTOLIC BLOOD PRESSURE: 81 MMHG | HEART RATE: 66 BPM | SYSTOLIC BLOOD PRESSURE: 174 MMHG

## 2021-08-24 DIAGNOSIS — I10 HYPERTENSION, UNSPECIFIED TYPE: ICD-10-CM

## 2021-08-24 DIAGNOSIS — M17.12 OSTEOARTHRITIS OF LEFT KNEE, UNSPECIFIED OSTEOARTHRITIS TYPE: ICD-10-CM

## 2021-08-24 DIAGNOSIS — R42 DIZZINESS: ICD-10-CM

## 2021-08-24 DIAGNOSIS — J45.20 MILD INTERMITTENT ASTHMA WITHOUT COMPLICATION: Primary | ICD-10-CM

## 2021-08-24 DIAGNOSIS — K21.00 GASTROESOPHAGEAL REFLUX DISEASE WITH ESOPHAGITIS WITHOUT HEMORRHAGE: ICD-10-CM

## 2021-08-24 DIAGNOSIS — M25.562 LEFT KNEE PAIN, UNSPECIFIED CHRONICITY: Primary | ICD-10-CM

## 2021-08-24 DIAGNOSIS — N30.00 ACUTE CYSTITIS WITHOUT HEMATURIA: ICD-10-CM

## 2021-08-24 PROCEDURE — 3017F COLORECTAL CA SCREEN DOC REV: CPT | Performed by: FAMILY MEDICINE

## 2021-08-24 PROCEDURE — G8427 DOCREV CUR MEDS BY ELIG CLIN: HCPCS | Performed by: FAMILY MEDICINE

## 2021-08-24 PROCEDURE — G8419 CALC BMI OUT NRM PARAM NOF/U: HCPCS | Performed by: ORTHOPAEDIC SURGERY

## 2021-08-24 PROCEDURE — G9899 SCRN MAM PERF RSLTS DOC: HCPCS | Performed by: ORTHOPAEDIC SURGERY

## 2021-08-24 PROCEDURE — G8510 SCR DEP NEG, NO PLAN REQD: HCPCS | Performed by: FAMILY MEDICINE

## 2021-08-24 PROCEDURE — G8427 DOCREV CUR MEDS BY ELIG CLIN: HCPCS | Performed by: ORTHOPAEDIC SURGERY

## 2021-08-24 PROCEDURE — 1111F DSCHRG MED/CURRENT MED MERGE: CPT | Performed by: ORTHOPAEDIC SURGERY

## 2021-08-24 PROCEDURE — G8419 CALC BMI OUT NRM PARAM NOF/U: HCPCS | Performed by: FAMILY MEDICINE

## 2021-08-24 PROCEDURE — 3017F COLORECTAL CA SCREEN DOC REV: CPT | Performed by: ORTHOPAEDIC SURGERY

## 2021-08-24 PROCEDURE — G9231 DOC ESRD DIA TRANS PREG: HCPCS | Performed by: FAMILY MEDICINE

## 2021-08-24 PROCEDURE — G9899 SCRN MAM PERF RSLTS DOC: HCPCS | Performed by: FAMILY MEDICINE

## 2021-08-24 PROCEDURE — 99213 OFFICE O/P EST LOW 20 MIN: CPT | Performed by: ORTHOPAEDIC SURGERY

## 2021-08-24 PROCEDURE — G9231 DOC ESRD DIA TRANS PREG: HCPCS | Performed by: ORTHOPAEDIC SURGERY

## 2021-08-24 PROCEDURE — 1111F DSCHRG MED/CURRENT MED MERGE: CPT | Performed by: FAMILY MEDICINE

## 2021-08-24 PROCEDURE — 99213 OFFICE O/P EST LOW 20 MIN: CPT | Performed by: FAMILY MEDICINE

## 2021-08-24 PROCEDURE — G8510 SCR DEP NEG, NO PLAN REQD: HCPCS | Performed by: ORTHOPAEDIC SURGERY

## 2021-08-24 RX ORDER — MECLIZINE HYDROCHLORIDE 25 MG/1
TABLET ORAL
Qty: 21 TABLET | Refills: 0 | Status: SHIPPED | OUTPATIENT
Start: 2021-08-24 | End: 2021-11-30

## 2021-08-24 RX ORDER — ALBUTEROL SULFATE 0.83 MG/ML
SOLUTION RESPIRATORY (INHALATION)
Qty: 90 ML | Refills: 3 | Status: SHIPPED | OUTPATIENT
Start: 2021-08-24 | End: 2021-12-14 | Stop reason: SDUPTHER

## 2021-08-24 RX ORDER — CARVEDILOL 6.25 MG/1
6.25 TABLET ORAL 2 TIMES DAILY WITH MEALS
Qty: 180 TABLET | Refills: 2 | Status: SHIPPED | OUTPATIENT
Start: 2021-08-24 | End: 2022-04-12

## 2021-08-24 RX ORDER — LOSARTAN POTASSIUM 50 MG/1
50 TABLET ORAL DAILY
Qty: 90 TABLET | Refills: 3 | Status: SHIPPED | OUTPATIENT
Start: 2021-08-24 | End: 2022-07-27 | Stop reason: SDUPTHER

## 2021-08-24 RX ORDER — DEXLANSOPRAZOLE 60 MG/1
1 CAPSULE, DELAYED RELEASE ORAL DAILY
Qty: 30 CAPSULE | Refills: 5 | Status: SHIPPED | OUTPATIENT
Start: 2021-08-24 | End: 2022-01-13

## 2021-08-24 RX ORDER — NITROFURANTOIN 25; 75 MG/1; MG/1
100 CAPSULE ORAL 2 TIMES DAILY
Qty: 20 CAPSULE | Refills: 0 | Status: SHIPPED | OUTPATIENT
Start: 2021-08-24 | End: 2021-10-07

## 2021-08-24 RX ORDER — LEVOTHYROXINE SODIUM 50 UG/1
50 TABLET ORAL DAILY
Qty: 90 TABLET | Refills: 3 | Status: SHIPPED | OUTPATIENT
Start: 2021-08-24 | End: 2022-04-12

## 2021-08-24 RX ORDER — AMLODIPINE BESYLATE 10 MG/1
10 TABLET ORAL DAILY
Qty: 30 TABLET | Refills: 5 | Status: SHIPPED | OUTPATIENT
Start: 2021-08-24 | End: 2021-12-20 | Stop reason: SDUPTHER

## 2021-08-24 RX ORDER — HYOSCYAMINE SULFATE 0.12 MG/1
0.12 TABLET SUBLINGUAL
Qty: 30 TABLET | Refills: 5 | Status: SHIPPED | OUTPATIENT
Start: 2021-08-24 | End: 2022-01-17 | Stop reason: SDUPTHER

## 2021-08-24 NOTE — PROGRESS NOTES
Steven Hendrix presents today for   Chief Complaint   Patient presents with   [facility] Follow Up     hospital follow up        Is someone accompanying this pt? Yes      Is the patient using any DME equipment during OV? No      Depression Screening:  3 most recent PHQ Screens 8/24/2021   Little interest or pleasure in doing things Several days   Feeling down, depressed, irritable, or hopeless Several days   Total Score PHQ 2 2       Learning Assessment:  Learning Assessment 8/17/2021   PRIMARY LEARNER Patient   HIGHEST LEVEL OF EDUCATION - PRIMARY LEARNER  SOME COLLEGE   BARRIERS PRIMARY LEARNER NONE   PRIMARY LANGUAGE ENGLISH   LEARNER PREFERENCE PRIMARY DEMONSTRATION   LEARNING SPECIAL TOPICS NO   ANSWERED BY SELF   RELATIONSHIP SELF       Fall Risk  Fall Risk Assessment, last 12 mths 12/24/2020   Able to walk? Yes   Fall in past 12 months? 0   Do you feel unsteady? 0   Are you worried about falling 0   Number of falls in past 12 months -   Fall with injury? -       ADL  ADL Assessment 12/24/2020   Feeding yourself No Help Needed   Getting from bed to chair No Help Needed   Getting dressed No Help Needed   Bathing or showering No Help Needed   Walk across the room (includes cane/walker) No Help Needed   Using the telphone No Help Needed   Taking your medications No Help Needed   Preparing meals No Help Needed   Managing money (expenses/bills) No Help Needed   Moderately strenuous housework (laundry) No Help Needed   Shopping for personal items (toiletries/medicines) No Help Needed   Shopping for groceries No Help Needed   Driving No Help Needed   Climbing a flight of stairs No Help Needed   Getting to places beyond walking distances No Help Needed       Travel Screening:    Travel Screening     Question   Response    In the last month, have you been in contact with someone who was confirmed or suspected to have Coronavirus / COVID-19? No / Unsure    Have you had a COVID-19 viral test in the last 14 days?   No Do you have any of the following new or worsening symptoms? None of these    Have you traveled internationally or domestically in the last month? No      Travel History   Travel since 07/24/21     No documented travel since 07/24/21          Health Maintenance reviewed and discussed and ordered per Provider. Health Maintenance Due   Topic Date Due    Hepatitis C Screening  Never done    COVID-19 Vaccine (1) Never done    DTaP/Tdap/Td series (1 - Tdap) Never done    PAP AKA CERVICAL CYTOLOGY  Never done    Shingrix Vaccine Age 50> (1 of 2) Never done    Pneumococcal 0-64 years (3 of 4 - PCV13) 11/13/2017    Medicare Yearly Exam  Never done   . Coordination of Care:  1. Have you been to the ER, urgent care clinic since your last visit? Hospitalized since your last visit? Yes      2. Have you seen or consulted any other health care providers outside of the [address] [place] [address] since your last visit? Include any pap smears or colon screening.  No

## 2021-08-24 NOTE — PATIENT INSTRUCTIONS
Knee Pain or Injury: Care Instructions  Your Care Instructions     Injuries are a common cause of knee problems. Sudden (acute) injuries may be caused by a direct blow to the knee. They can also be caused by abnormal twisting, bending, or falling on the knee. Pain, bruising, or swelling may be severe, and may start within minutes of the injury. Overuse is another cause of knee pain. Other causes are climbing stairs, kneeling, and other activities that use the knee. Everyday wear and tear, especially as you get older, also can cause knee pain. Rest, along with home treatment, often relieves pain and allows your knee to heal. If you have a serious knee injury, you may need tests and treatment. Follow-up care is a key part of your treatment and safety. Be sure to make and go to all appointments, and call your doctor if you are having problems. It's also a good idea to know your test results and keep a list of the medicines you take. How can you care for yourself at home? · Be safe with medicines. Read and follow all instructions on the label. ? If the doctor gave you a prescription medicine for pain, take it as prescribed. ? If you are not taking a prescription pain medicine, ask your doctor if you can take an over-the-counter medicine. · Rest and protect your knee. Take a break from any activity that may cause pain. · Put ice or a cold pack on your knee for 10 to 20 minutes at a time. Put a thin cloth between the ice and your skin. · Prop up a sore knee on a pillow when you ice it or anytime you sit or lie down for the next 3 days. Try to keep it above the level of your heart. This will help reduce swelling. · If your knee is not swollen, you can put moist heat, a heating pad, or a warm cloth on your knee. · If your doctor recommends an elastic bandage, sleeve, or other type of support for your knee, wear it as directed.   · Follow your doctor's instructions about how much weight you can put on your leg. Use a cane, crutches, or a walker as instructed. · Follow your doctor's instructions about activity during your healing process. If you can do mild exercise, slowly increase your activity. · Reach and stay at a healthy weight. Extra weight can strain the joints, especially the knees and hips, and make the pain worse. Losing even a few pounds may help. When should you call for help? Call 911 anytime you think you may need emergency care. For example, call if:    · You have symptoms of a blood clot in your lung (called a pulmonary embolism). These may include:  ? Sudden chest pain. ? Trouble breathing. ? Coughing up blood. Call your doctor now or seek immediate medical care if:    · You have severe or increasing pain.     · Your leg or foot turns cold or changes color.     · You cannot stand or put weight on your knee.     · Your knee looks twisted or bent out of shape.     · You cannot move your knee.     · You have signs of infection, such as:  ? Increased pain, swelling, warmth, or redness. ? Red streaks leading from the knee. ? Pus draining from a place on your knee. ? A fever.     · You have signs of a blood clot in your leg (called a deep vein thrombosis), such as:  ? Pain in your calf, back of the knee, thigh, or groin. ? Redness and swelling in your leg or groin. Watch closely for changes in your health, and be sure to contact your doctor if:    · You have tingling, weakness, or numbness in your knee.     · You have any new symptoms, such as swelling.     · You have bruises from a knee injury that last longer than 2 weeks.     · You do not get better as expected. Where can you learn more? Go to http://www.gray.com/  Enter K195 in the search box to learn more about \"Knee Pain or Injury: Care Instructions. \"  Current as of: February 26, 2020               Content Version: 12.8  © 5446-7903 [employer].    Care instructions adapted under license by [facility] (which disclaims liability or warranty for this information). If you have questions about a medical condition or this instruction, always ask your healthcare professional. [address] any warranty or liability for your use of this information.

## 2021-08-24 NOTE — PROGRESS NOTES
Subjective:   Amalia Almendarez is a 61 y.o. female who was seen for Hospital Follow Up (hospital follow up )    HPI the patient is a 63-year-old female recently diagnosed with diverticulitis and on Augmentin in the hospital for 3 days and discharged at home only given 4 more days of antibiotics I received a culture today positive for medication that is not sensitive and she is going to need starting on Macrodantin. She has had no vomiting or diarrhea. No cough or cold. No chest pain or shortness of breath. She has had a recurrent urinary tract infection she is on dialysis only 2 days a week. The recent creatinine I saw was 10.4 I suspect that is because they are short staffed. Home Medications    Medication Sig Start Date End Date Taking? Authorizing Provider   levothyroxine (SYNTHROID) 50 mcg tablet Take 1 Tablet by mouth daily. 8/24/21  Yes Stu Soto MD   meclizine (ANTIVERT) 25 mg tablet TAKE 1 TABLET BY MOUTH THREE TIMES DAILY FOR UP TO 10 DAYS AS NEEDED FOR DIZZINESS 8/24/21  Yes Stu Stoo MD   carvediloL (COREG) 6.25 mg tablet Take 1 Tablet by mouth two (2) times daily (with meals). 8/24/21  Yes Stu Soto MD   dexlansoprazole (Dexilant) 60 mg CpDB capsule (delayed release) Take 1 Capsule by mouth daily. 8/24/21  Yes Stu Soto MD   hyoscyamine SL (LEVSIN/SL) 0.125 mg SL tablet 1 Tablet by SubLINGual route every four (4) hours as needed (irritable bowel). 8/24/21  Yes Stu Soto MD   albuterol (PROVENTIL VENTOLIN) 2.5 mg /3 mL (0.083 %) nebu USE 1 VIAL VIA NEBULIZER THREE TIMES DAILY 8/24/21  Yes Stu Soto MD   losartan (COZAAR) 50 mg tablet Take 1 Tablet by mouth daily. 8/24/21  Yes Stu Soto MD   amLODIPine (NORVASC) 10 mg tablet Take 1 Tablet by mouth daily. 8/24/21  Yes Stu Soto MD   nitrofurantoin, macrocrystal-monohydrate, (Macrobid) 100 mg capsule Take 1 Capsule by mouth two (2) times a day.  8/24/21  Yes Stu Soto MD   warfarin (COUMADIN) 2 mg tablet TAKE 3 TABLETS BY MOUTH EVERY DAY 8/15/21  Yes Madiha Younger MD   calcitRIOL (ROCALTROL) 0.25 mcg capsule Take 1 Capsule by mouth as directed. Take on non dialysis days 7/19/21  Yes Other, MD Abelardo   doxercalciferol (HECTOROL IV) 14 mcg by IntraVENous route two (2) days a week. 3/12/21 3/11/22 Yes Other, MD Abelardo   lidocaine-prilocaine (EMLA) topical cream as directed. 7/23/21  Yes Other, MD Abelardo   methoxy peg-epoetin beta (MIRCERA INJECTION) 100 mcg. 8/2/21 8/1/22 Yes Other, MD Abelardo   predniSONE (DELTASONE) 5 mg tablet Take 5 mg by mouth daily. 2/8/21  Yes Other, MD Abelardo   warfarin sodium (WARFARIN PO) Take  by mouth. Patient unclear on dose, last recorded fill in June   Yes Provider, Historical   ondansetron (Zofran ODT) 4 mg disintegrating tablet Take 1 Tablet by mouth every eight (8) hours as needed for Nausea. 7/30/21  Yes Kisha Ya MD   Wixela Inhub 250-50 mcg/dose diskus inhaler INHALE 1 PUFF BY MOUTH EVERY 12 HOURS 7/3/21  Yes Madiha Younger MD   RenaPlex-D 800 mcg-12.5 mg -2,000 unit tab Take 1 Tablet by mouth daily. 4/15/21  Yes Other, MD Abelardo   sevelamer carbonate (RENVELA) 800 mg tab tab Take 800 mg by mouth three (3) times daily. 6/8/21  Yes Other, MD Abelardo   zolpidem (AMBIEN) 5 mg tablet Take 5 mg by mouth nightly as needed. 4/5/21  Yes Other, MD Abelardo   dicyclomine (BENTYL) 10 mg capsule TAKE 1 CAPSULE BY MOUTH FOUR TIMES DAILY 4/15/21  Yes Madiha Younger MD   sucralfate (CARAFATE) 1 gram tablet TAKE 1 TABLET BY MOUTH FOUR TIMES DAILY 3/28/21  Yes Madiha Younger MD   valGANciclovir (VALCYTE) 450 mg tablet Take 2 Tabs by mouth daily. 3/2/21  Yes Madiha Younger MD   simvastatin (ZOCOR) 20 mg tablet TAKE 1 TABLET BY MOUTH DAILY AS DIRECTED. 2/19/21  Yes Madiha Younger MD   sertraline (ZOLOFT) 50 mg tablet Take 1 Tab by mouth daily.  2/10/21  Yes Madiha Younger MD   amiodarone (CORDARONE) 200 mg tablet TAKE 1 TABLET BY MOUTH EVERY DAY 1/13/21  Yes Hai Lia Suh MD   aluminum & magnesium hydroxide-simethicone (Maalox Maximum Strength) 400-400-40 mg/5 mL suspension Take 10 mL by mouth every six (6) hours as needed for Indigestion. 9/17/20  Yes Amanda Layne MD   ESTRACE 0.01 % (0.1 mg/gram) vaginal cream INSERT 2 GRAMS INTO VAGINA EVERY MONDAY AND THURSDAY 4/11/17  Yes Jasmin Taylor MD   cranberry extract 425 mg cap 425 mg. 1/17/14  Yes Provider, Historical   LORazepam (ATIVAN) 0.5 mg tablet Take 0.5 mg by mouth daily. Yes Provider, Historical   fluticasone (FLONASE) 50 mcg/actuation nasal spray 1 Spray. Yes Provider, Historical   EPINEPHrine (EPIPEN) 0.3 mg/0.3 mL injection 0.3 mg. 10/1/16  Yes Provider, Historical   montelukast (SINGULAIR) 10 mg tablet Take 10 mg by mouth daily. Yes Provider, Historical      Allergies   Allergen Reactions    Aspirin Rash and Unknown (comments)    Bactrim [Sulfamethoxazole-Trimethoprim] Rash and Unknown (comments)    Bromfenac Rash and Unknown (comments)    Cefepime Other (comments)     Neurological reaction    Ceftriaxone Rash    Copper Rash    Ibuprofen Rash and Unknown (comments)    Ketorolac Tromethamine Rash and Unknown (comments)    Morphine Rash and Unknown (comments)    Relafen [Nabumetone] Rash and Unknown (comments)    Rifampin Rash and Unknown (comments)    Vancomycin Rash and Unknown (comments)     Pt reports causes itching, takes benadryl prior to use. Pt denies anaphylaxis. Requires benadryl with each vancomycin dose     Social History     Tobacco Use    Smoking status: Never Smoker    Smokeless tobacco: Never Used   Vaping Use    Vaping Use: Never used   Substance Use Topics    Alcohol use: No    Drug use: No            Review of Systems   Constitutional: Negative. HENT: Negative. Eyes: Negative. Respiratory: Negative. Cardiovascular: Negative. Gastrointestinal: Negative. Endocrine: Negative. Genitourinary: Negative. Musculoskeletal: Negative. Allergic/Immunologic: Negative. Hematological: Negative. Physical Exam   Objective:     Visit Vitals  BP (!) 174/81   Pulse 66   SpO2 99%      General: alert, cooperative, no distress   Mental  status: normal mood, behavior, speech, dress, motor activity, and thought processes, able to follow commands   HENT: NCAT   Neck: no visualized mass   Resp: no respiratory distress   Neuro: no gross deficits   Skin: no discoloration or lesions of concern on visible areas   Psychiatric: normal affect, consistent with stated mood, no evidence of hallucinations   Afebrile. Vital signs are stable HEENT exam is unremarkable the chest is clear the cardiovascular exam showed a regular rate and rhythm the abdomen is benign    Assessment & Plan:     Chronic renal disease recurrent urinary tract infection hypertension: The patient's run out of her medication I am going to refill all that medication she knows better than to do that I have added Macrobid 1 twice daily for 10 days and will follow up if any other issues arise I had a long discussion with the patient about medication compliance. 712    Additional exam findings: We discussed the expected course, resolution and complications of the diagnosis(es) in detail. Medication risks, benefits, costs, interactions, and alternatives were discussed as indicated. I advised her to contact the office if her condition worsens, changes or fails to improve as anticipated. She expressed understanding with the diagnosis(es) and plan.

## 2021-08-24 NOTE — PROGRESS NOTES
Name: Simone Tello    : 1955     Service Dept: [facility]    Patient's Pharmacies:    [facility] [address] [facility]  [address] [address]  Phone: [phone] Fax: [phone]  [facility] [address]  [address] [place] [address]  Phone: [phone] Fax: [phone]       Chief Complaint   Patient presents with    Leg Pain        There were no vitals taken for this visit. Allergies   Allergen Reactions    Aspirin Rash and Unknown (comments)    Bactrim [Sulfamethoxazole-Trimethoprim] Rash and Unknown (comments)    Bromfenac Rash and Unknown (comments)    Cefepime Other (comments)     Neurological reaction    Ceftriaxone Rash    Copper Rash    Ibuprofen Rash and Unknown (comments)    Ketorolac Tromethamine Rash and Unknown (comments)    Morphine Rash and Unknown (comments)    Relafen [Nabumetone] Rash and Unknown (comments)    Rifampin Rash and Unknown (comments)    Vancomycin Rash and Unknown (comments)     Pt reports causes itching, takes benadryl prior to use. Pt denies anaphylaxis. Requires benadryl with each vancomycin dose      Current Outpatient Medications   Medication Sig Dispense Refill    levothyroxine (SYNTHROID) 50 mcg tablet Take 1 Tablet by mouth daily. 90 Tablet 3    meclizine (ANTIVERT) 25 mg tablet TAKE 1 TABLET BY MOUTH THREE TIMES DAILY FOR UP TO 10 DAYS AS NEEDED FOR DIZZINESS 21 Tablet 0    carvediloL (COREG) 6.25 mg tablet Take 1 Tablet by mouth two (2) times daily (with meals). 180 Tablet 2    dexlansoprazole (Dexilant) 60 mg CpDB capsule (delayed release) Take 1 Capsule by mouth daily. 30 Capsule 5    hyoscyamine SL (LEVSIN/SL) 0.125 mg SL tablet 1 Tablet by SubLINGual route every four (4) hours as needed (irritable bowel).  30 Tablet 5    albuterol (PROVENTIL VENTOLIN) 2.5 mg /3 mL (0.083 %) nebu USE 1 VIAL VIA NEBULIZER THREE TIMES DAILY 90 mL 3    losartan (COZAAR) 50 mg tablet Take 1 Tablet by mouth daily. 90 Tablet 3    amLODIPine (NORVASC) 10 mg tablet Take 1 Tablet by mouth daily. 30 Tablet 5    warfarin (COUMADIN) 2 mg tablet TAKE 3 TABLETS BY MOUTH EVERY DAY 30 Tablet 0    calcitRIOL (ROCALTROL) 0.25 mcg capsule Take 1 Capsule by mouth as directed. Take on non dialysis days      doxercalciferol (HECTOROL IV) 14 mcg by IntraVENous route two (2) days a week.  lidocaine-prilocaine (EMLA) topical cream as directed.  methoxy peg-epoetin beta (MIRCERA INJECTION) 100 mcg.  predniSONE (DELTASONE) 5 mg tablet Take 5 mg by mouth daily.  warfarin sodium (WARFARIN PO) Take  by mouth. Patient unclear on dose, last recorded fill in June      ondansetron (Zofran ODT) 4 mg disintegrating tablet Take 1 Tablet by mouth every eight (8) hours as needed for Nausea. 14 Tablet 0    Wixela Inhub 250-50 mcg/dose diskus inhaler INHALE 1 PUFF BY MOUTH EVERY 12 HOURS 60 Each 3    RenaPlex-D 800 mcg-12.5 mg -2,000 unit tab Take 1 Tablet by mouth daily.  sevelamer carbonate (RENVELA) 800 mg tab tab Take 800 mg by mouth three (3) times daily.  zolpidem (AMBIEN) 5 mg tablet Take 5 mg by mouth nightly as needed.  dicyclomine (BENTYL) 10 mg capsule TAKE 1 CAPSULE BY MOUTH FOUR TIMES DAILY 120 Cap 1    sucralfate (CARAFATE) 1 gram tablet TAKE 1 TABLET BY MOUTH FOUR TIMES DAILY 360 Tab 0    valGANciclovir (VALCYTE) 450 mg tablet Take 2 Tabs by mouth daily. 90 Tab 5    simvastatin (ZOCOR) 20 mg tablet TAKE 1 TABLET BY MOUTH DAILY AS DIRECTED. 90 Tab 1    sertraline (ZOLOFT) 50 mg tablet Take 1 Tab by mouth daily. 90 Tab 0    amiodarone (CORDARONE) 200 mg tablet TAKE 1 TABLET BY MOUTH EVERY DAY 90 Tab 2    aluminum & magnesium hydroxide-simethicone (Maalox Maximum Strength) 400-400-40 mg/5 mL suspension Take 10 mL by mouth every six (6) hours as needed for Indigestion. 250 mL 0    ESTRACE 0.01 % (0.1 mg/gram) vaginal cream INSERT 2 GRAMS INTO VAGINA EVERY MONDAY AND THURSDAY 42.5 g 5    cranberry extract 425 mg cap 425 mg.  LORazepam (ATIVAN) 0.5 mg tablet Take 0.5 mg by mouth daily.  fluticasone (FLONASE) 50 mcg/actuation nasal spray 1 Spray.  EPINEPHrine (EPIPEN) 0.3 mg/0.3 mL injection 0.3 mg.      montelukast (SINGULAIR) 10 mg tablet Take 10 mg by mouth daily. Patient Active Problem List   Diagnosis Code    History of kidney transplant Z94.0    CMV (cytomegalovirus) antibody positive R76.8    Hematuria R31.9    Fecal incontinence R15.9    Drug-induced hyperglycemia R73.9, T50.905A    Diverticulitis of intestine K57.92    Anemia D64.9    Acute renal failure (HCC) N17.9    Asthma J45.909    Exacerbation of asthma J45. 0    Chronic kidney disease, stage III (moderate) ([facility]) N18.30    Chronic obstructive pulmonary disease ([facility]) J44.9    Cystic disease of kidney Q61.9    Diverticular disease of large intestine K57.30    Essential hypertension I10    Fever R50.9    Gastritis and duodenitis K29.90    Seasonal allergic rhinitis due to pollen J30.1    Hyperlipidemia E78.5    Hyperkalemia E87.5    Disease due to gram-negative bacillus B96.89    Kidney transplant rejection T86.11    Migraine without status migrainosus, not intractable G43.909    Nausea and vomiting R11.2    Pruritus L29.9    Recurrent urinary tract infection N39.0    UTI (urinary tract infection) N39.0    Renal transplant disorder T86.10    Systemic infection ([facility]) A41.9    Systemic inflammatory response syndrome (SIRS) ([facility]) R65.10    Ulcer [id number]    Renal cyst N28.1    Obesity E66.9    Migraine G43.909    Hypertension I10    Hypercholesteremia E78.00    GERD (gastroesophageal reflux disease) K21.9    Burning with urination R30.0    Arrhythmia I49.9    Acute recurrent maxillary sinusitis J01.01    Calculus of gallbladder with acute cholecystitis without obstruction K80.00    Hypothyroidism E03.9    Vertigo R42    ESRD (end stage renal disease) on dialysis (HCC) N18.6, Z99.2    History of DVT (deep vein thrombosis) Z86.718    Stomatitis K12.1    Thrush of mouth and esophagus (HCC) B37.81, B37.0    Encounter for cholecystectomy Z76.89    Acute left-sided low back pain without sciatica M54.5    Atrial fibrillation (HCC) I48.91    Chronic anticoagulation Z79.01    Acute hyperkalemia E87.5    Left leg pain M79.605    Intractable vomiting R11.10      Family History   Problem Relation Age of Onset    Colon Polyps Brother     Cancer Mother     Diabetes Father       Social History     Socioeconomic History    Marital status:      Spouse name: Not on file    Number of children: Not on file    Years of education: Not on file    Highest education level: Not on file   Tobacco Use    Smoking status: Never Smoker    Smokeless tobacco: Never Used   Vaping Use    Vaping Use: Never used   Substance and Sexual Activity    Alcohol use: No    Drug use: No    Sexual activity: Not Currently     Social Determinants of Health     Financial Resource Strain:     Difficulty of Paying Living Expenses:    Food Insecurity:     Worried About Running Out of Food in the Last Year:     Ran Out of Food in the Last Year:    Transportation Needs:     Lack of Transportation (Medical):      Lack of Transportation (Non-Medical):    Physical Activity:     Days of Exercise per Week:     Minutes of Exercise per Session:    Stress:     Feeling of Stress :    Social Connections:     Frequency of Communication with Friends and Family:     Frequency of Social Gatherings with Friends and Family:     Attends Che[employer] Services:     Active Member of Clubs or Organizations:     Attends Club or Organization Meetings:     Marital Status:       Past Surgical History:   Procedure Laterality Date    COLONOSCOPY      Dr Josue Bailon  5yrs ago    ENDOSCOPY VISIT-OUTPATIENT Dr Erika Guzmán  5 yrs ago    ENDOSCOPY, COLON, DIAGNOSTIC      with Dr. Vee Lantigua HX CHOLECYSTECTOMY  02/2021    HX GI      ulcer    HX HEENT      sinus surg    HX RENAL TRANSPLANT      HX TRANSPLANT      kidney transplant 2002    VASCULAR SURGERY PROCEDURE UNLIST      shunt in prep for dialysis      Past Medical History:   Diagnosis Date    Anemia NEC     Arrhythmia     Asthma     Asthma     Burning with urination     frequent uti    Chronic kidney disease     dialysis    CMV (cytomegalovirus) antibody positive     Dialysis patient ([facility] [address]     M/W/F    Dyspepsia and other specified disorders of function of stomach     stomach ulcer    Essential hypertension     GERD (gastroesophageal reflux disease)     Hypercholesteremia     Hypertension     Migraine     Obesity     Renal cyst 2002    kidney transplant     Ulcer         I have reviewed and agree with [address] [place] [address] Nw and ROS and intake form in chart and the record furthermore I have reviewed prior medical record(s) regarding this patients care during this appointment. Review of Systems:   Patient is a pleasant appearing individual, appropriately dressed, well hydrated, well nourished, who is alert, appropriately oriented for age, and in no acute distress with a normal gait and normal affect who does not appear to be in any significant pain. Physical Exam:  Left Knee -Decrease range of motion with flexion, Knee arc of greater than 50 degrees, Some crepitation, Grossly neurovascularly intact, Good cap refill, No skin lesion, Moderate swelling, No gross instability, Some quadriceps weakness, greater than 50 degree arc, calf pain    Right Knee - Full Range of Motion, No crepitation, Grossly neurovascularly intact, Good cap refill, No skin lesion, No swelling, No gross instability, No quadriceps weakness   Encounter Diagnoses     ICD-10-CM ICD-9-CM   1. Left knee pain, unspecified chronicity  M25.562 719.46   2.  Osteoarthritis of left knee, unspecified osteoarthritis type  M17.12 715.96       HPI:  The patient is here with a chief complaint of left leg pain, throbbing, burning pain. Post injection helped, but she still has a little bit of calf pain. Pain is 8/10. Assessment/Plan:  1. Left knee pain with a little bit of calf tenderness. We will get an ultrasound of the left lower extremity to rule out a DVT. We will see the patient post ultrasound and go from there, but there appears to be some chronic pain. As part of continued conservative pain management options the patient was advised to utilize Tylenol or OTC NSAIDS as long as it is not medically contraindicated. Return to Office: Follow-up and Dispositions    · Return in about 3 weeks (around 9/14/2021). Scribed by Katy Ormond, LPN as dictated by [facility] ALIYAH Bradley MD.  Documentation True and Accepted Zeeshan Bradley MD

## 2021-08-25 ENCOUNTER — PATIENT OUTREACH (OUTPATIENT)
Dept: CASE MANAGEMENT | Age: 66
End: 2021-08-25

## 2021-08-25 ENCOUNTER — HOSPITAL ENCOUNTER (OUTPATIENT)
Dept: LAB | Age: 66
Discharge: HOME | End: 2021-08-25
Payer: MEDICARE

## 2021-08-25 LAB
ANION GAP SERPL CALC-SCNC: 14 MMOL/L
BUN SERPL-MCNC: 50 MG/DL (ref 9–21)
BUN/CREAT SERPL: 9
CA-I BLD-MCNC: 9 MG/DL (ref 8.5–10.5)
CHLORIDE SERPL-SCNC: 96 MMOL/L (ref 94–111)
CO2 SERPL-SCNC: 27 MMOL/L (ref 21–33)
CREAT SERPL-MCNC: 5.3 MG/DL (ref 0.7–1.2)
GLUCOSE SERPL-MCNC: 100 MG/DL (ref 70–110)
POTASSIUM SERPL-SCNC: 4.2 MMOL/L (ref 3.2–5.1)
SODIUM SERPL-SCNC: 137 MMOL/L (ref 135–145)

## 2021-08-25 PROCEDURE — 36415 COLL VENOUS BLD VENIPUNCTURE: CPT

## 2021-08-25 PROCEDURE — 80048 BASIC METABOLIC PNL TOTAL CA: CPT

## 2021-08-25 NOTE — PROGRESS NOTES
Care Transitions Initial Call    Call within 2 business days of discharge: Yes     Patient: Dagmar Linares Patient : 1955 MRN: [id number]    Last Discharge [address]       Complaint Diagnosis Description Type Department Provider    21 Fatigue; Diarrhea Intractable vomiting with nausea, unspecified vomiting type ED to Hosp-Admission (Discharged) (ADMIT) [facility] Nia Dubose MD; Janet Schmid... Was this an external facility discharge? No Discharge Facility: [facility]    Call placed to patient at prefered number. Attempted to reach patient for hospital follow up . No answer and there was no way to leave a message because mailbox full. CAll placed to mobile number on file. Message left introducing myself, the purpose of the call and giving my contact information. Requested that patient call back at their earliest convenience. [facility] follow up appointment(s):   Future Appointments   Date Time Provider Elena Coello   2021  9:30 AM [facility] 1 [facility]   2021  1:20 PM Jamey LLANOS MD [facility] BS AMB   Unable to reach patient x 3 attempts. Letter sent to patient and episode closed.

## 2021-08-25 NOTE — LETTER
8/25/2021 4:57 PM    Ms. G[place]vara [facility] [address]      Dear Hanane Escoto    [facility] and I am a Care Transition Nurse who partners with (practice or provider Selina Winchester MD) to improve patients' health. I've been trying to reach you via phone to let you know that, as a member of your care team, I will work with other providers involved in your care, offer education for your specific health conditions, and connect you with more resources as needed. This program is designed to provide you with the opportunity to have a ([facility]/[address]) care manager partner with you for the following situations:     1) if you come home from the hospital or emergency room   2) to help manage your disease   3) when you would like assistance coordinating services or appointments    This added support is provided at no additional cost to you. My primary focus is to help you achieve specific goals and improve your health. Please call me at number below to further discuss how I can support your health care needs.     Sincerely,    Sachin Mims RN  Care Transitions Nurse  [phone]

## 2021-09-14 ENCOUNTER — HOSPITAL ENCOUNTER (EMERGENCY)
Age: 66
Discharge: HOME | End: 2021-09-14
Attending: FAMILY MEDICINE
Payer: MEDICARE

## 2021-09-14 ENCOUNTER — APPOINTMENT (OUTPATIENT)
Dept: CT IMAGING | Age: 66
End: 2021-09-14
Attending: FAMILY MEDICINE
Payer: MEDICARE

## 2021-09-14 ENCOUNTER — OFFICE VISIT (OUTPATIENT)
Dept: ORTHOPEDIC SURGERY | Age: 66
End: 2021-09-14
Payer: MEDICARE

## 2021-09-14 ENCOUNTER — APPOINTMENT (OUTPATIENT)
Dept: VASCULAR SURGERY | Age: 66
End: 2021-09-14
Attending: FAMILY MEDICINE
Payer: MEDICARE

## 2021-09-14 VITALS
OXYGEN SATURATION: 100 % | SYSTOLIC BLOOD PRESSURE: 152 MMHG | BODY MASS INDEX: 28.32 KG/M2 | DIASTOLIC BLOOD PRESSURE: 77 MMHG | RESPIRATION RATE: 18 BRPM | HEART RATE: 68 BPM | TEMPERATURE: 98.1 F | WEIGHT: 150 LBS | HEIGHT: 61 IN

## 2021-09-14 DIAGNOSIS — M79.662 PAIN OF LEFT CALF: ICD-10-CM

## 2021-09-14 DIAGNOSIS — I82.411 ACUTE DEEP VEIN THROMBOSIS (DVT) OF FEMORAL VEIN OF RIGHT LOWER EXTREMITY (HCC): Primary | ICD-10-CM

## 2021-09-14 DIAGNOSIS — M25.562 LEFT KNEE PAIN, UNSPECIFIED CHRONICITY: Primary | ICD-10-CM

## 2021-09-14 LAB
ANION GAP SERPL CALC-SCNC: 12 MMOL/L
BUN SERPL-MCNC: 36 MG/DL (ref 9–21)
BUN/CREAT SERPL: 5
CA-I BLD-MCNC: 9 MG/DL (ref 8.5–10.5)
CHLORIDE SERPL-SCNC: 97 MMOL/L (ref 94–111)
CO2 SERPL-SCNC: 29 MMOL/L (ref 21–33)
CREAT SERPL-MCNC: 6.7 MG/DL (ref 0.7–1.2)
GLUCOSE SERPL-MCNC: 77 MG/DL (ref 70–110)
INR PPP: 1 (ref 0.8–1.2)
POTASSIUM SERPL-SCNC: 5.2 MMOL/L (ref 3.2–5.1)
PROTHROMBIN TIME: 12.4 SEC (ref 11.5–15.2)
SODIUM SERPL-SCNC: 138 MMOL/L (ref 135–145)

## 2021-09-14 PROCEDURE — G8432 DEP SCR NOT DOC, RNG: HCPCS | Performed by: ORTHOPAEDIC SURGERY

## 2021-09-14 PROCEDURE — G8419 CALC BMI OUT NRM PARAM NOF/U: HCPCS | Performed by: ORTHOPAEDIC SURGERY

## 2021-09-14 PROCEDURE — 80048 BASIC METABOLIC PNL TOTAL CA: CPT

## 2021-09-14 PROCEDURE — G9899 SCRN MAM PERF RSLTS DOC: HCPCS | Performed by: ORTHOPAEDIC SURGERY

## 2021-09-14 PROCEDURE — 93970 EXTREMITY STUDY: CPT

## 2021-09-14 PROCEDURE — 36415 COLL VENOUS BLD VENIPUNCTURE: CPT

## 2021-09-14 PROCEDURE — 3017F COLORECTAL CA SCREEN DOC REV: CPT | Performed by: ORTHOPAEDIC SURGERY

## 2021-09-14 PROCEDURE — G9231 DOC ESRD DIA TRANS PREG: HCPCS | Performed by: ORTHOPAEDIC SURGERY

## 2021-09-14 PROCEDURE — 99213 OFFICE O/P EST LOW 20 MIN: CPT | Performed by: ORTHOPAEDIC SURGERY

## 2021-09-14 PROCEDURE — 99283 EMERGENCY DEPT VISIT LOW MDM: CPT

## 2021-09-14 PROCEDURE — G8427 DOCREV CUR MEDS BY ELIG CLIN: HCPCS | Performed by: ORTHOPAEDIC SURGERY

## 2021-09-14 PROCEDURE — 85610 PROTHROMBIN TIME: CPT

## 2021-09-14 RX ORDER — APIXABAN 5 MG (74)
KIT ORAL
Qty: 1 DOSE PACK | Refills: 0 | Status: SHIPPED | OUTPATIENT
Start: 2021-09-14 | End: 2022-02-02

## 2021-09-14 NOTE — ED PROVIDER NOTES
EMERGENCY DEPARTMENT HISTORY AND PHYSICAL EXAM      Date: 9/14/2021  Patient Name: Ajith Santos    History of Presenting Illness     Chief Complaint   Patient presents with    Leg Pain       History Provided By: Patient    HPI: Ajith Santos, 61 y.o. female with a past medical history significant multiple medical problems presents to the ED with cc of left leg pain. Patient had symptoms for over a month. She states that Dr. Juanita Patel saw her today and wanted to get a ultrasound of her leg. She denies any chest pain or shortness of breath. There are no other complaints, changes, or physical findings at this time. PCP: Mervat Lamb MD    No current facility-administered medications on file prior to encounter. Current Outpatient Medications on File Prior to Encounter   Medication Sig Dispense Refill    levothyroxine (SYNTHROID) 50 mcg tablet Take 1 Tablet by mouth daily. 90 Tablet 3    meclizine (ANTIVERT) 25 mg tablet TAKE 1 TABLET BY MOUTH THREE TIMES DAILY FOR UP TO 10 DAYS AS NEEDED FOR DIZZINESS 21 Tablet 0    carvediloL (COREG) 6.25 mg tablet Take 1 Tablet by mouth two (2) times daily (with meals). 180 Tablet 2    dexlansoprazole (Dexilant) 60 mg CpDB capsule (delayed release) Take 1 Capsule by mouth daily. 30 Capsule 5    hyoscyamine SL (LEVSIN/SL) 0.125 mg SL tablet 1 Tablet by SubLINGual route every four (4) hours as needed (irritable bowel). 30 Tablet 5    albuterol (PROVENTIL VENTOLIN) 2.5 mg /3 mL (0.083 %) nebu USE 1 VIAL VIA NEBULIZER THREE TIMES DAILY 90 mL 3    losartan (COZAAR) 50 mg tablet Take 1 Tablet by mouth daily. 90 Tablet 3    amLODIPine (NORVASC) 10 mg tablet Take 1 Tablet by mouth daily. 30 Tablet 5    nitrofurantoin, macrocrystal-monohydrate, (Macrobid) 100 mg capsule Take 1 Capsule by mouth two (2) times a day.  20 Capsule 0    [DISCONTINUED] warfarin (COUMADIN) 2 mg tablet TAKE 3 TABLETS BY MOUTH EVERY DAY 30 Tablet 0    calcitRIOL (ROCALTROL) 0.25 mcg capsule Take 1 Capsule by mouth as directed. Take on non dialysis days      doxercalciferol (HECTOROL IV) 14 mcg by IntraVENous route two (2) days a week.  lidocaine-prilocaine (EMLA) topical cream as directed.  methoxy peg-epoetin beta (MIRCERA INJECTION) 100 mcg.  predniSONE (DELTASONE) 5 mg tablet Take 5 mg by mouth daily.  [DISCONTINUED] warfarin sodium (WARFARIN PO) Take  by mouth. Patient unclear on dose, last recorded fill in June      ondansetron (Zofran ODT) 4 mg disintegrating tablet Take 1 Tablet by mouth every eight (8) hours as needed for Nausea. 14 Tablet 0    Wixela Inhub 250-50 mcg/dose diskus inhaler INHALE 1 PUFF BY MOUTH EVERY 12 HOURS 60 Each 3    RenaPlex-D 800 mcg-12.5 mg -2,000 unit tab Take 1 Tablet by mouth daily.  sevelamer carbonate (RENVELA) 800 mg tab tab Take 800 mg by mouth three (3) times daily.  zolpidem (AMBIEN) 5 mg tablet Take 5 mg by mouth nightly as needed.  dicyclomine (BENTYL) 10 mg capsule TAKE 1 CAPSULE BY MOUTH FOUR TIMES DAILY 120 Cap 1    sucralfate (CARAFATE) 1 gram tablet TAKE 1 TABLET BY MOUTH FOUR TIMES DAILY 360 Tab 0    valGANciclovir (VALCYTE) 450 mg tablet Take 2 Tabs by mouth daily. 90 Tab 5    simvastatin (ZOCOR) 20 mg tablet TAKE 1 TABLET BY MOUTH DAILY AS DIRECTED. 90 Tab 1    sertraline (ZOLOFT) 50 mg tablet Take 1 Tab by mouth daily. 90 Tab 0    amiodarone (CORDARONE) 200 mg tablet TAKE 1 TABLET BY MOUTH EVERY DAY 90 Tab 2    aluminum & magnesium hydroxide-simethicone (Maalox Maximum Strength) 400-400-40 mg/5 mL suspension Take 10 mL by mouth every six (6) hours as needed for Indigestion. 250 mL 0    ESTRACE 0.01 % (0.1 mg/gram) vaginal cream INSERT 2 GRAMS INTO VAGINA EVERY MONDAY AND THURSDAY 42.5 g 5    cranberry extract 425 mg cap 425 mg.  LORazepam (ATIVAN) 0.5 mg tablet Take 0.5 mg by mouth daily.  fluticasone (FLONASE) 50 mcg/actuation nasal spray 1 Spray.       EPINEPHrine (EPIPEN) 0.3 mg/0.3 mL injection 0.3 mg.      montelukast (SINGULAIR) 10 mg tablet Take 10 mg by mouth daily. Past History     Past Medical History:  Past Medical History:   Diagnosis Date    Anemia NEC     Arrhythmia     Asthma     Asthma     Burning with urination     frequent uti    Chronic kidney disease     dialysis    CMV (cytomegalovirus) antibody positive     Dialysis patient ([address]     M/W/F    Dyspepsia and other specified disorders of function of stomach     stomach ulcer    Essential hypertension     GERD (gastroesophageal reflux disease)     Hypercholesteremia     Hypertension     Migraine     Obesity     Renal cyst 2002    kidney transplant     Ulcer        Past Surgical History:  Past Surgical History:   Procedure Laterality Date    COLONOSCOPY      Dr Thang Donovan  5yrs ago    ENDOSCOPY VISIT-OUTPATIENT      Dr Thang Donovan  5 yrs ago    ENDOSCOPY, COLON, DIAGNOSTIC      with Dr. Jessica Hickman [address]  02/2021    HX GI      ulcer    HX HEENT      sinus surg    HX RENAL TRANSPLANT      HX TRANSPLANT      kidney transplant 2002    VASCULAR SURGERY PROCEDURE UNLIST      shunt in prep for dialysis       Family History:  Family History   Problem Relation Age of Onset    Colon Polyps Brother     Cancer Mother     Diabetes Father        Social History:  Social History     Tobacco Use    Smoking status: Never Smoker    Smokeless tobacco: Never Used   Vaping Use    Vaping Use: Never used   Substance Use Topics    Alcohol use: No    Drug use: No       Allergies:   Allergies   Allergen Reactions    Aspirin Rash and Unknown (comments)    Bactrim [Sulfamethoxazole-Trimethoprim] Rash and Unknown (comments)    Bromfenac Rash and Unknown (comments)    Cefepime Other (comments)     Neurological reaction    Ceftriaxone Rash    Copper Rash    Ibuprofen Rash and Unknown (comments)    Ketorolac Tromethamine Rash and Unknown (comments)    Morphine Rash and Unknown (comments)    Relafen [Nabumetone] Rash and Unknown (comments)    Rifampin Rash and Unknown (comments)    Vancomycin Rash and Unknown (comments)     Pt reports causes itching, takes benadryl prior to use. Pt denies anaphylaxis. Requires benadryl with each vancomycin dose         Review of Systems     Review of Systems   Constitutional: Negative for fatigue and fever. HENT: Negative for rhinorrhea and sore throat. Respiratory: Negative for cough and shortness of breath. Cardiovascular: Negative for chest pain and palpitations. Gastrointestinal: Negative for abdominal pain, diarrhea, nausea and vomiting. Genitourinary: Negative for difficulty urinating and dysuria. Musculoskeletal: Negative for arthralgias and myalgias. Left leg pain     Skin: Negative for color change and rash. Neurological: Negative for light-headedness and headaches. Physical Exam     Physical Exam  Vitals and nursing note reviewed. Constitutional:       General: She is awake. She is not in acute distress. Appearance: Normal appearance. She is well-developed and normal weight. She is not ill-appearing, toxic-appearing or diaphoretic. Interventions: Face mask in place. HENT:      Head: Normocephalic and atraumatic. Eyes:      Conjunctiva/sclera: Conjunctivae normal.      Pupils: Pupils are equal, round, and reactive to light. Cardiovascular:      Rate and Rhythm: Normal rate and regular rhythm. Pulses: Normal pulses. Heart sounds: Normal heart sounds. Pulmonary:      Effort: Pulmonary effort is normal.      Breath sounds: Normal breath sounds. Abdominal:      General: Abdomen is flat. Palpations: Abdomen is soft. Tenderness: There is no abdominal tenderness. Musculoskeletal:      Cervical back: Normal range of motion and neck supple. Comments: Left leg - calf tenderness. Skin:     General: Skin is warm and dry.    Neurological:      Mental Status: She is alert and oriented to person, place, and time. GCS: GCS eye subscore is 4. GCS verbal subscore is 5. GCS motor subscore is 6. Psychiatric:         Mood and Affect: Mood and affect normal.         Behavior: Behavior normal. Behavior is cooperative. Thought Content: Thought content normal.         Lab and Diagnostic Study Results     Labs -     Recent Results (from the past 12 hour(s))   METABOLIC PANEL, BASIC    Collection Time: 09/14/21  5:35 PM   Result Value Ref Range    Sodium 138 135 - 145 mmol/L    Potassium 5.2 (H) 3.2 - 5.1 mmol/L    Chloride 97 94 - 111 mmol/L    CO2 29 21 - 33 mmol/L    Anion gap 12 mmol/L    Glucose 77 70 - 110 mg/dL    BUN 36 (H) 9 - 21 mg/dL    Creatinine 6.70 (H) 0.70 - 1.20 mg/dL    BUN/Creatinine ratio 5      GFR est AA 8 ml/min/1.73m2    GFR est non-AA 6 ml/min/1.73m2    Calcium 9.0 8.5 - 10.5 mg/dL   PROTHROMBIN TIME + INR    Collection Time: 09/14/21  5:35 PM   Result Value Ref Range    Prothrombin time 12.4 11.5 - 15.2 sec    INR 1.0 0.8 - 1.2         Radiologic Studies -   @lastxrresult@  CT Results  (Last 48 hours)    None        CXR Results  (Last 48 hours)    None            Medical Decision Making   - I am the first provider for this patient. - I reviewed the vital signs, available nursing notes, past medical history, past surgical history, family history and social history. - Initial assessment performed. The patients presenting problems have been discussed, and they are in agreement with the care plan formulated and outlined with them. I have encouraged them to ask questions as they arise throughout their visit. Vital Signs-Reviewed the patient's vital signs. Patient Vitals for the past 12 hrs:   Temp Pulse Resp BP   09/14/21 1348 98.1 °F (36.7 °C) 65 20 (!) 152/77       Records Reviewed: Nursing Notes    The patient presents with leg pain with a differential diagnosis of acute skeletal versus DVT.       ED Course:       ED Course as of Sep 14 1905   Tue Sep 14, 2021   6295 CTA CHEST W OR W WO CONT [TH]      ED Course User Index  [TH] Jayy Nichols MD       Provider Notes (Medical Decision Making):     6508 -vascular tech passed along report that the patient does not have a DVT in her left leg but has 1 in her right femoral vein. She denies any chest pain or shortness of breath. She is on Coumadin for A. fib. We will check some labs to see if her Coumadin is therapeutic. Patient INR is 1. Clearly she is not therapeutic. Going to switch her over to a renal dose Eliquis. She is hemodynamically stable and there is low concern for PE.    MDM       Procedures   Medical Decision Makingedical Decision Making  Performed by: Lincoln Blake MD  PROCEDURES:  Procedures       Disposition   Disposition:     Discharged    DISCHARGE PLAN:  1. Current Discharge Medication List      CONTINUE these medications which have NOT CHANGED    Details   levothyroxine (SYNTHROID) 50 mcg tablet Take 1 Tablet by mouth daily. Qty: 90 Tablet, Refills: 3      meclizine (ANTIVERT) 25 mg tablet TAKE 1 TABLET BY MOUTH THREE TIMES DAILY FOR UP TO 10 DAYS AS NEEDED FOR DIZZINESS  Qty: 21 Tablet, Refills: 0    Associated Diagnoses: Dizziness      carvediloL (COREG) 6.25 mg tablet Take 1 Tablet by mouth two (2) times daily (with meals). Qty: 180 Tablet, Refills: 2    Associated Diagnoses: Hypertension, unspecified type      dexlansoprazole (Dexilant) 60 mg CpDB capsule (delayed release) Take 1 Capsule by mouth daily. Qty: 30 Capsule, Refills: 5    Associated Diagnoses: Gastroesophageal reflux disease with esophagitis without hemorrhage      hyoscyamine SL (LEVSIN/SL) 0.125 mg SL tablet 1 Tablet by SubLINGual route every four (4) hours as needed (irritable bowel).   Qty: 30 Tablet, Refills: 5    Associated Diagnoses: Gastroesophageal reflux disease with esophagitis without hemorrhage      albuterol (PROVENTIL VENTOLIN) 2.5 mg /3 mL (0.083 %) nebu USE 1 VIAL VIA NEBULIZER THREE TIMES DAILY  Qty: 90 mL, Refills: 3    Associated Diagnoses: Mild intermittent asthma without complication      losartan (COZAAR) 50 mg tablet Take 1 Tablet by mouth daily. Qty: 90 Tablet, Refills: 3    Associated Diagnoses: Hypertension, unspecified type      amLODIPine (NORVASC) 10 mg tablet Take 1 Tablet by mouth daily. Qty: 30 Tablet, Refills: 5    Associated Diagnoses: Hypertension, unspecified type      nitrofurantoin, macrocrystal-monohydrate, (Macrobid) 100 mg capsule Take 1 Capsule by mouth two (2) times a day. Qty: 20 Capsule, Refills: 0    Associated Diagnoses: Acute cystitis without hematuria      !! warfarin (COUMADIN) 2 mg tablet TAKE 3 TABLETS BY MOUTH EVERY DAY  Qty: 30 Tablet, Refills: 0      calcitRIOL (ROCALTROL) 0.25 mcg capsule Take 1 Capsule by mouth as directed. Take on non dialysis days      doxercalciferol (HECTOROL IV) 14 mcg by IntraVENous route two (2) days a week.      lidocaine-prilocaine (EMLA) topical cream as directed. methoxy peg-epoetin beta (MIRCERA INJECTION) 100 mcg. predniSONE (DELTASONE) 5 mg tablet Take 5 mg by mouth daily. !! warfarin sodium (WARFARIN PO) Take  by mouth. Patient unclear on dose, last recorded fill in June      ondansetron (Zofran ODT) 4 mg disintegrating tablet Take 1 Tablet by mouth every eight (8) hours as needed for Nausea. Qty: 14 Tablet, Refills: 0      Wixela Inhub 250-50 mcg/dose diskus inhaler INHALE 1 PUFF BY MOUTH EVERY 12 HOURS  Qty: 60 Each, Refills: 3    Associated Diagnoses: Seasonal allergic rhinitis due to pollen      RenaPlex-D 800 mcg-12.5 mg -2,000 unit tab Take 1 Tablet by mouth daily. sevelamer carbonate (RENVELA) 800 mg tab tab Take 800 mg by mouth three (3) times daily. zolpidem (AMBIEN) 5 mg tablet Take 5 mg by mouth nightly as needed.       dicyclomine (BENTYL) 10 mg capsule TAKE 1 CAPSULE BY MOUTH FOUR TIMES DAILY  Qty: 120 Cap, Refills: 1      sucralfate (CARAFATE) 1 gram tablet TAKE 1 TABLET BY MOUTH FOUR TIMES DAILY  Qty: 360 Tab, Refills: 0      valGANciclovir (VALCYTE) 450 mg tablet Take 2 Tabs by mouth daily. Qty: 90 Tab, Refills: 5    Associated Diagnoses: ESRD (end stage renal disease) on dialysis (HCC)      simvastatin (ZOCOR) 20 mg tablet TAKE 1 TABLET BY MOUTH DAILY AS DIRECTED. Qty: 90 Tab, Refills: 1      sertraline (ZOLOFT) 50 mg tablet Take 1 Tab by mouth daily. Qty: 90 Tab, Refills: 0      amiodarone (CORDARONE) 200 mg tablet TAKE 1 TABLET BY MOUTH EVERY DAY  Qty: 90 Tab, Refills: 2      aluminum & magnesium hydroxide-simethicone (Maalox Maximum Strength) 400-400-40 mg/5 mL suspension Take 10 mL by mouth every six (6) hours as needed for Indigestion. Qty: 250 mL, Refills: 0      ESTRACE 0.01 % (0.1 mg/gram) vaginal cream INSERT 2 GRAMS INTO VAGINA EVERY MONDAY AND THURSDAY  Qty: 42.5 g, Refills: 5      cranberry extract 425 mg cap 425 mg. LORazepam (ATIVAN) 0.5 mg tablet Take 0.5 mg by mouth daily. fluticasone (FLONASE) 50 mcg/actuation nasal spray 1 Spray. EPINEPHrine (EPIPEN) 0.3 mg/0.3 mL injection 0.3 mg.      montelukast (SINGULAIR) 10 mg tablet Take 10 mg by mouth daily. !! - Potential duplicate medications found. Please discuss with provider. 2.   Follow-up Information     Follow up With Specialties Details Why Rupert Acosta MD Family Medicine In 2 days  [address] [facility]  [phone]          3. Return to ED if worse   4. Current Discharge Medication List      START taking these medications    Details   apixaban (Eliquis DVT-PE Treat 30D Start) 5 mg (74 tabs) starter pack Take 5 mg by mouth twice a day for 7 days Followed by 2.5 mg by mouth twice a day  Qty: 1 Dose Pack, Refills: 0  Start date: 9/14/2021         STOP taking these medications       warfarin (COUMADIN) 2 mg tablet Comments:   Reason for Stopping:         warfarin sodium (WARFARIN PO) Comments:   Reason for Stopping:                 Diagnosis     Clinical Impression:   1. Acute deep vein thrombosis (DVT) of femoral vein of right lower extremity (HCC)        Attestations:    Mo Wolff MD    Please note that this dictation was completed with [employer], the computer voice recognition software. Quite often unanticipated grammatical, syntax, homophones, and other interpretive errors are inadvertently transcribed by the computer software. Please disregard these errors. Please excuse any errors that have escaped final proofreading. Thank you.

## 2021-09-14 NOTE — ED NOTES
I have reviewed discharge instructions with the patient. The patient verbalized understanding. Patient requesting pain medication. Dr. Yissel Grove informed, reports she can follow up with Dr. Hanna Nj tomorrow and continue with OTC medications since she is on blood thinners. Patient informed.

## 2021-09-14 NOTE — PROGRESS NOTES
Name: Simone Tello    : 1955     Service Dept: [facility]    Patient's Pharmacies:    [facility] [address], [address] [facility]  [address] [address]  Phone: [phone] Fax: [phone]  [facility] [address] [facility] [address]  [address] [place] [address]  Phone: [phone] Fax: [phone]       Chief Complaint   Patient presents with    Leg Pain        There were no vitals taken for this visit. Allergies   Allergen Reactions    Aspirin Rash and Unknown (comments)    Bactrim [Sulfamethoxazole-Trimethoprim] Rash and Unknown (comments)    Bromfenac Rash and Unknown (comments)    Cefepime Other (comments)     Neurological reaction    Ceftriaxone Rash    Copper Rash    Ibuprofen Rash and Unknown (comments)    Ketorolac Tromethamine Rash and Unknown (comments)    Morphine Rash and Unknown (comments)    Relafen [Nabumetone] Rash and Unknown (comments)    Rifampin Rash and Unknown (comments)    Vancomycin Rash and Unknown (comments)     Pt reports causes itching, takes benadryl prior to use. Pt denies anaphylaxis. Requires benadryl with each vancomycin dose      Current Outpatient Medications   Medication Sig Dispense Refill    levothyroxine (SYNTHROID) 50 mcg tablet Take 1 Tablet by mouth daily. 90 Tablet 3    meclizine (ANTIVERT) 25 mg tablet TAKE 1 TABLET BY MOUTH THREE TIMES DAILY FOR UP TO 10 DAYS AS NEEDED FOR DIZZINESS 21 Tablet 0    carvediloL (COREG) 6.25 mg tablet Take 1 Tablet by mouth two (2) times daily (with meals). 180 Tablet 2    dexlansoprazole (Dexilant) 60 mg CpDB capsule (delayed release) Take 1 Capsule by mouth daily. 30 Capsule 5    hyoscyamine SL (LEVSIN/SL) 0.125 mg SL tablet 1 Tablet by SubLINGual route every four (4) hours as needed (irritable bowel).  30 Tablet 5    albuterol (PROVENTIL VENTOLIN) 2.5 mg /3 mL (0.083 %) nebu USE 1 VIAL VIA NEBULIZER THREE TIMES DAILY 90 mL 3    losartan (COZAAR) 50 mg tablet Take 1 Tablet by mouth daily. 90 Tablet 3    amLODIPine (NORVASC) 10 mg tablet Take 1 Tablet by mouth daily. 30 Tablet 5    nitrofurantoin, macrocrystal-monohydrate, (Macrobid) 100 mg capsule Take 1 Capsule by mouth two (2) times a day. 20 Capsule 0    warfarin (COUMADIN) 2 mg tablet TAKE 3 TABLETS BY MOUTH EVERY DAY 30 Tablet 0    calcitRIOL (ROCALTROL) 0.25 mcg capsule Take 1 Capsule by mouth as directed. Take on non dialysis days      doxercalciferol (HECTOROL IV) 14 mcg by IntraVENous route two (2) days a week.  lidocaine-prilocaine (EMLA) topical cream as directed.  methoxy peg-epoetin beta (MIRCERA INJECTION) 100 mcg.  predniSONE (DELTASONE) 5 mg tablet Take 5 mg by mouth daily.  warfarin sodium (WARFARIN PO) Take  by mouth. Patient unclear on dose, last recorded fill in June      ondansetron (Zofran ODT) 4 mg disintegrating tablet Take 1 Tablet by mouth every eight (8) hours as needed for Nausea. 14 Tablet 0    Wixela Inhub 250-50 mcg/dose diskus inhaler INHALE 1 PUFF BY MOUTH EVERY 12 HOURS 60 Each 3    RenaPlex-D 800 mcg-12.5 mg -2,000 unit tab Take 1 Tablet by mouth daily.  sevelamer carbonate (RENVELA) 800 mg tab tab Take 800 mg by mouth three (3) times daily.  zolpidem (AMBIEN) 5 mg tablet Take 5 mg by mouth nightly as needed.  dicyclomine (BENTYL) 10 mg capsule TAKE 1 CAPSULE BY MOUTH FOUR TIMES DAILY 120 Cap 1    sucralfate (CARAFATE) 1 gram tablet TAKE 1 TABLET BY MOUTH FOUR TIMES DAILY 360 Tab 0    valGANciclovir (VALCYTE) 450 mg tablet Take 2 Tabs by mouth daily. 90 Tab 5    simvastatin (ZOCOR) 20 mg tablet TAKE 1 TABLET BY MOUTH DAILY AS DIRECTED. 90 Tab 1    sertraline (ZOLOFT) 50 mg tablet Take 1 Tab by mouth daily.  90 Tab 0    amiodarone (CORDARONE) 200 mg tablet TAKE 1 TABLET BY MOUTH EVERY DAY 90 Tab 2    aluminum & magnesium hydroxide-simethicone (Maalox Maximum Strength) 400-400-40 mg/5 mL suspension Take 10 mL by mouth every six (6) hours as needed for Indigestion. 250 mL 0    ESTRACE 0.01 % (0.1 mg/gram) vaginal cream INSERT 2 GRAMS INTO VAGINA EVERY MONDAY AND THURSDAY 42.5 g 5    cranberry extract 425 mg cap 425 mg.  LORazepam (ATIVAN) 0.5 mg tablet Take 0.5 mg by mouth daily.  fluticasone (FLONASE) 50 mcg/actuation nasal spray 1 Spray.  EPINEPHrine (EPIPEN) 0.3 mg/0.3 mL injection 0.3 mg.      montelukast (SINGULAIR) 10 mg tablet Take 10 mg by mouth daily. Patient Active Problem List   Diagnosis Code    History of kidney transplant Z94.0    CMV (cytomegalovirus) antibody positive R76.8    Hematuria R31.9    Fecal incontinence R15.9    Drug-induced hyperglycemia R73.9, T50.905A    Diverticulitis of intestine K57.92    Anemia D64.9    Acute renal failure (HCC) N17.9    Asthma J45.909    Exacerbation of asthma J45. 0    Chronic kidney disease, stage III (moderate) ([facility]) N18.30    Chronic obstructive pulmonary disease (HCC) J44.9    Cystic disease of kidney Q61.9    Diverticular disease of large intestine K57.30    Essential hypertension I10    Fever R50.9    Gastritis and duodenitis K29.90    Seasonal allergic rhinitis due to pollen J30.1    Hyperlipidemia E78.5    Hyperkalemia E87.5    Disease due to gram-negative bacillus B96.89    Kidney transplant rejection T86.11    Migraine without status migrainosus, not intractable G43.909    Nausea and vomiting R11.2    Pruritus L29.9    Recurrent urinary tract infection N39.0    UTI (urinary tract infection) N39.0    Renal transplant disorder T86.10    Systemic infection (HCC) A41.9    Systemic inflammatory response syndrome (SIRS) ([facility]) R65.10    Ulcer [id number]    Renal cyst N28.1    Obesity E66.9    Migraine G43.909    Hypertension I10    Hypercholesteremia E78.00    GERD (gastroesophageal reflux disease) K21.9    Burning with urination R30.0    Arrhythmia I49.9    Acute recurrent maxillary sinusitis J01.01    Calculus of gallbladder with acute cholecystitis without obstruction K80.00    Hypothyroidism E03.9    Vertigo R42    ESRD (end stage renal disease) on dialysis ([facility]) N18.6, Z99.2    History of DVT (deep vein thrombosis) Z86.718    Stomatitis K12.1    Thrush of mouth and esophagus ([facility]) B37.81, B37.0    Encounter for cholecystectomy Z76.89    Acute left-sided low back pain without sciatica M54.5    Atrial fibrillation ([facility]) I48.91    Chronic anticoagulation Z79.01    Acute hyperkalemia E87.5    Left leg pain M79.605    Intractable vomiting R11.10      Family History   Problem Relation Age of Onset    Colon Polyps Brother     Cancer Mother     Diabetes Father       Social History     Socioeconomic History    Marital status:      Spouse name: Not on file    Number of children: Not on file    Years of education: Not on file    Highest education level: Not on file   Tobacco Use    Smoking status: Never Smoker    Smokeless tobacco: Never Used   Vaping Use    Vaping Use: Never used   Substance and Sexual Activity    Alcohol use: No    Drug use: No    Sexual activity: Not Currently     Social Determinants of Health     Financial Resource Strain:     Difficulty of Paying Living Expenses:    Food Insecurity:     Worried About Running Out of Food in the Last Year:     Ran Out of Food in the Last Year:    Transportation Needs:     Lack of Transportation (Medical):      Lack of Transportation (Non-Medical):    Physical Activity:     Days of Exercise per Week:     Minutes of Exercise per Session:    Stress:     Feeling of Stress :    Social Connections:     Frequency of Communication with Friends and Family:     Frequency of Social Gatherings with Friends and Family:     Attends Hoahaoism Services:     Active Member of Clubs or Organizations:     Attends Club or Organization Meetings:    Joe Ulloa Marital Status:       Past Surgical History:   Procedure Laterality Date    COLONOSCOPY      Dr General Oconnor  5yrs ago    ENDOSCOPY VISIT-OUTPATIENT      Dr General Oconnor  5 yrs ago    ENDOSCOPY, COLON, DIAGNOSTIC      with Dr. Alexander Peers HX CHOLECYSTECTOMY  02/2021    HX GI      ulcer    HX HEENT      sinus surg    HX RENAL TRANSPLANT      HX TRANSPLANT      kidney transplant 2002    VASCULAR SURGERY PROCEDURE UNLIST      shunt in prep for dialysis      Past Medical History:   Diagnosis Date    Anemia NEC     Arrhythmia     Asthma     Asthma     Burning with urination     frequent uti    Chronic kidney disease     dialysis    CMV (cytomegalovirus) antibody positive     Dialysis patient ([facility] [address]     M/W/F    Dyspepsia and other specified disorders of function of stomach     stomach ulcer    Essential hypertension     GERD (gastroesophageal reflux disease)     Hypercholesteremia     Hypertension     Migraine     Obesity     Renal cyst 2002    kidney transplant     Ulcer         I have reviewed and agree with [facility] and ROS and intake form in chart and the record furthermore I have reviewed prior medical record(s) regarding this patients care during this appointment. Review of Systems:   Patient is a pleasant appearing individual, appropriately dressed, well hydrated, well nourished, who is alert, appropriately oriented for age, and in no acute distress with a normal gait and normal affect who does not appear to be in any significant pain.    Physical Exam:  Left Knee -Decrease range of motion with flexion, Knee arc of greater than 50 degrees, Some crepitation, Grossly neurovascularly intact, Good cap refill, No skin lesion, Moderate swelling, No gross instability, Some quadriceps weakness, Kellgren and Darell at least grade 3, with calf pain      Right Knee - Full Range of Motion, No crepitation, Grossly neurovascularly intact, Good cap refill, No skin lesion, No swelling, No gross instability, No quadriceps weakness   Encounter Diagnoses     ICD-10-CM ICD-9-CM   1. Left knee pain, unspecified chronicity  M25.562 719.46   2. Pain of left calf  M79.662 729.5       HPI:  The patient is here with a chief complaint of left ankle pain, dull throbbing pain, progressively getting worse. Assessment/Plan: We were supposed to get an ultrasound set up. She never went to her appointment. We will get her reset for an ultrasound. We will see her back as needed. As part of continued conservative pain management options the patient was advised to utilize Tylenol or OTC NSAIDS as long as it is not medically contraindicated. Return to Office: Follow-up and Dispositions    · Return for POST PVL. Scribed by Gualberto Bernal LPN as dictated by [facility] [place] ALIYAH Bertrand MD.  Documentation True and Accepted Zeeshan Bertrand MD

## 2021-09-14 NOTE — LETTER
6/28/4427    Patient: Scot Delong   YOB: 1955   Date of Visit: 9/14/2021     Ronan Tejada MD  [facility]    Dear Ronan Tejada MD,      Thank you for referring Ms. Myranda De Leon to [address] [place] [address] [facility] for evaluation. My notes for this consultation are attached. If you have questions, please do not hesitate to call me. I look forward to following your patient along with you.       Sincerely,    Zaina Evangelista MD

## 2021-09-14 NOTE — PATIENT INSTRUCTIONS
Knee Pain or Injury: Care Instructions  Your Care Instructions     Injuries are a common cause of knee problems. Sudden (acute) injuries may be caused by a direct blow to the knee. They can also be caused by abnormal twisting, bending, or falling on the knee. Pain, bruising, or swelling may be severe, and may start within minutes of the injury. Overuse is another cause of knee pain. Other causes are climbing stairs, kneeling, and other activities that use the knee. Everyday wear and tear, especially as you get older, also can cause knee pain. Rest, along with home treatment, often relieves pain and allows your knee to heal. If you have a serious knee injury, you may need tests and treatment. Follow-up care is a key part of your treatment and safety. Be sure to make and go to all appointments, and call your doctor if you are having problems. It's also a good idea to know your test results and keep a list of the medicines you take. How can you care for yourself at home? · Be safe with medicines. Read and follow all instructions on the label. ? If the doctor gave you a prescription medicine for pain, take it as prescribed. ? If you are not taking a prescription pain medicine, ask your doctor if you can take an over-the-counter medicine. · Rest and protect your knee. Take a break from any activity that may cause pain. · Put ice or a cold pack on your knee for 10 to 20 minutes at a time. Put a thin cloth between the ice and your skin. · Prop up a sore knee on a pillow when you ice it or anytime you sit or lie down for the next 3 days. Try to keep it above the level of your heart. This will help reduce swelling. · If your knee is not swollen, you can put moist heat, a heating pad, or a warm cloth on your knee. · If your doctor recommends an elastic bandage, sleeve, or other type of support for your knee, wear it as directed.   · Follow your doctor's instructions about how much weight you can put on your leg. Use a cane, crutches, or a walker as instructed. · Follow your doctor's instructions about activity during your healing process. If you can do mild exercise, slowly increase your activity. · Reach and stay at a healthy weight. Extra weight can strain the joints, especially the knees and hips, and make the pain worse. Losing even a few pounds may help. When should you call for help? Call 911 anytime you think you may need emergency care. For example, call if:    · You have symptoms of a blood clot in your lung (called a pulmonary embolism). These may include:  ? Sudden chest pain. ? Trouble breathing. ? Coughing up blood. Call your doctor now or seek immediate medical care if:    · You have severe or increasing pain.     · Your leg or foot turns cold or changes color.     · You cannot stand or put weight on your knee.     · Your knee looks twisted or bent out of shape.     · You cannot move your knee.     · You have signs of infection, such as:  ? Increased pain, swelling, warmth, or redness. ? Red streaks leading from the knee. ? Pus draining from a place on your knee. ? A fever.     · You have signs of a blood clot in your leg (called a deep vein thrombosis), such as:  ? Pain in your calf, back of the knee, thigh, or groin. ? Redness and swelling in your leg or groin. Watch closely for changes in your health, and be sure to contact your doctor if:    · You have tingling, weakness, or numbness in your knee.     · You have any new symptoms, such as swelling.     · You have bruises from a knee injury that last longer than 2 weeks.     · You do not get better as expected. Where can you learn more? Go to http://www.gray.com/  Enter K195 in the search box to learn more about \"Knee Pain or Injury: Care Instructions. \"  Current as of: February 26, 2020               Content Version: 12.8  © 3161-7945 [employer].    Care instructions adapted under license by [employer] (which disclaims liability or warranty for this information). If you have questions about a medical condition or this instruction, always ask your healthcare professional. [address] any warranty or liability for your use of this information.

## 2021-09-17 DIAGNOSIS — N18.6 ESRD (END STAGE RENAL DISEASE) ON DIALYSIS (HCC): ICD-10-CM

## 2021-09-17 DIAGNOSIS — Z99.2 ESRD (END STAGE RENAL DISEASE) ON DIALYSIS (HCC): ICD-10-CM

## 2021-09-18 RX ORDER — AMIODARONE HYDROCHLORIDE 200 MG/1
TABLET ORAL
Qty: 90 TABLET | Refills: 2 | Status: SHIPPED | OUTPATIENT
Start: 2021-09-18 | End: 2022-07-27 | Stop reason: SDUPTHER

## 2021-09-18 RX ORDER — VALGANCICLOVIR 450 MG/1
TABLET, FILM COATED ORAL
Qty: 90 TABLET | Refills: 5 | Status: SHIPPED | OUTPATIENT
Start: 2021-09-18 | End: 2021-10-07

## 2021-09-18 RX ORDER — VALGANCICLOVIR 450 MG/1
TABLET, FILM COATED ORAL
Qty: 90 TABLET | Refills: 5 | Status: SHIPPED | OUTPATIENT
Start: 2021-09-18 | End: 2022-07-27 | Stop reason: SDUPTHER

## 2021-09-28 ENCOUNTER — OFFICE VISIT (OUTPATIENT)
Dept: ORTHOPEDIC SURGERY | Age: 66
End: 2021-09-28
Payer: MEDICARE

## 2021-09-28 DIAGNOSIS — M54.50 LOW BACK PAIN, UNSPECIFIED BACK PAIN LATERALITY, UNSPECIFIED CHRONICITY, UNSPECIFIED WHETHER SCIATICA PRESENT: Primary | ICD-10-CM

## 2021-09-28 PROCEDURE — G8419 CALC BMI OUT NRM PARAM NOF/U: HCPCS | Performed by: ORTHOPAEDIC SURGERY

## 2021-09-28 PROCEDURE — 99213 OFFICE O/P EST LOW 20 MIN: CPT | Performed by: ORTHOPAEDIC SURGERY

## 2021-09-28 PROCEDURE — 3017F COLORECTAL CA SCREEN DOC REV: CPT | Performed by: ORTHOPAEDIC SURGERY

## 2021-09-28 PROCEDURE — G9231 DOC ESRD DIA TRANS PREG: HCPCS | Performed by: ORTHOPAEDIC SURGERY

## 2021-09-28 PROCEDURE — G8427 DOCREV CUR MEDS BY ELIG CLIN: HCPCS | Performed by: ORTHOPAEDIC SURGERY

## 2021-09-28 PROCEDURE — G9899 SCRN MAM PERF RSLTS DOC: HCPCS | Performed by: ORTHOPAEDIC SURGERY

## 2021-09-28 PROCEDURE — G8432 DEP SCR NOT DOC, RNG: HCPCS | Performed by: ORTHOPAEDIC SURGERY

## 2021-09-28 NOTE — PATIENT INSTRUCTIONS
Back Pain: Care Instructions  Your Care Instructions     Back pain has many possible causes. It is often related to problems with muscles and ligaments of the back. It may also be related to problems with the nerves, discs, or bones of the back. Moving, lifting, standing, sitting, or sleeping in an awkward way can strain the back. Sometimes you don't notice the injury until later. Arthritis is another common cause of back pain. Although it may hurt a lot, back pain usually improves on its own within several weeks. Most people recover in 12 weeks or less. Using good home treatment and being careful not to stress your back can help you feel better sooner. Follow-up care is a key part of your treatment and safety. Be sure to make and go to all appointments, and call your doctor if you are having problems. It's also a good idea to know your test results and keep a list of the medicines you take. How can you care for yourself at home? · Sit or lie in positions that are most comfortable and reduce your pain. Try one of these positions when you lie down:  ? Lie on your back with your knees bent and supported by large pillows. ? Lie on the floor with your legs on the seat of a sofa or chair. ? Lie on your side with your knees and hips bent and a pillow between your legs. ? Lie on your stomach if it does not make pain worse. · Do not sit up in bed, and avoid soft couches and twisted positions. Bed rest can help relieve pain at first, but it delays healing. Avoid bed rest after the first day of back pain. · Change positions every 30 minutes. If you must sit for long periods of time, take breaks from sitting. Get up and walk around, or lie in a comfortable position. · Try using a heating pad on a low or medium setting for 15 to 20 minutes every 2 or 3 hours. Try a warm shower in place of one session with the heating pad. · You can also try an ice pack for 10 to 15 minutes every 2 to 3 hours.  Put a thin cloth between the ice pack and your skin. · Take pain medicines exactly as directed. ? If the doctor gave you a prescription medicine for pain, take it as prescribed. ? If you are not taking a prescription pain medicine, ask your doctor if you can take an over-the-counter medicine. · Take short walks several times a day. You can start with 5 to 10 minutes, 3 or 4 times a day, and work up to longer walks. Walk on level surfaces and avoid hills and stairs until your back is better. · Return to work and other activities as soon as you can. Continued rest without activity is usually not good for your back. · To prevent future back pain, do exercises to stretch and strengthen your back and stomach. Learn how to use good posture, safe lifting techniques, and proper body mechanics. When should you call for help? Call your doctor now or seek immediate medical care if:    · You have new or worsening numbness in your legs.     · You have new or worsening weakness in your legs. (This could make it hard to stand up.)     · You lose control of your bladder or bowels. Watch closely for changes in your health, and be sure to contact your doctor if:    · You have a fever, lose weight, or don't feel well.     · You do not get better as expected. Where can you learn more? Go to http://www.pa.com/  Enter I594 in the search box to learn more about \"Back Pain: Care Instructions. \"  Current as of: July 1, 2021               Content Version: 13.0  © 7427-2859 [employer]. Care instructions adapted under license by [employer] (which disclaims liability or warranty for this information). If you have questions about a medical condition or this instruction, always ask your healthcare professional. [place] [address] any warranty or liability for your use of this information.

## 2021-09-28 NOTE — LETTER
7/17/7400    Patient: Jermaine Jean Baptiste   YOB: 1955   Date of Visit: 9/28/2021     Stan Chacon MD  [facility] Norene Goltz    Dear Stan Chacon MD,      Thank you for referring Ms. Betty Bloch to [address] [place] [address] for evaluation. My notes for this consultation are attached. If you have questions, please do not hesitate to call me. I look forward to following your patient along with you.       Sincerely,    Elida Genao MD

## 2021-09-28 NOTE — PROGRESS NOTES
Name: Kamran Goel    : 1955     Service Dept: [address] [facility]    Patient's Pharmacies:    [facility] [address] [facility]  [address] [address]  Phone: [phone] Fax: [phone]  [facility] [address]  [address] [place] [address]  Phone: [phone] Fax: [phone]       Chief Complaint   Patient presents with    Leg Pain        There were no vitals taken for this visit. Allergies   Allergen Reactions    Aspirin Rash and Unknown (comments)    Bactrim [Sulfamethoxazole-Trimethoprim] Rash and Unknown (comments)    Bromfenac Rash and Unknown (comments)    Cefepime Other (comments)     Neurological reaction    Ceftriaxone Rash    Copper Rash    Ibuprofen Rash and Unknown (comments)    Ketorolac Tromethamine Rash and Unknown (comments)    Morphine Rash and Unknown (comments)    Relafen [Nabumetone] Rash and Unknown (comments)    Rifampin Rash and Unknown (comments)    Vancomycin Rash and Unknown (comments)     Pt reports causes itching, takes benadryl prior to use. Pt denies anaphylaxis. Requires benadryl with each vancomycin dose      Current Outpatient Medications   Medication Sig Dispense Refill    amiodarone (CORDARONE) 200 mg tablet TAKE 1 TABLET BY MOUTH EVERY DAY 90 Tablet 2    valGANciclovir (VALCYTE) 450 mg tablet TAKE 2 TABLETS BY MOUTH DAILY 90 Tablet 5    valGANciclovir (VALCYTE) 450 mg tablet TAKE 2 TABLETS BY MOUTH DAILY 90 Tablet 5    apixaban (Eliquis DVT-PE Treat 30D Start) 5 mg (74 tabs) starter pack Take 5 mg by mouth twice a day for 7 days Followed by 2.5 mg by mouth twice a day 1 Dose Pack 0    levothyroxine (SYNTHROID) 50 mcg tablet Take 1 Tablet by mouth daily.  90 Tablet 3    meclizine (ANTIVERT) 25 mg tablet TAKE 1 TABLET BY MOUTH THREE TIMES DAILY FOR UP TO 10 DAYS AS NEEDED FOR DIZZINESS 21 Tablet 0    carvediloL (COREG) 6.25 mg tablet Take 1 Tablet by mouth two (2) times daily (with meals). 180 Tablet 2    dexlansoprazole (Dexilant) 60 mg CpDB capsule (delayed release) Take 1 Capsule by mouth daily. 30 Capsule 5    hyoscyamine SL (LEVSIN/SL) 0.125 mg SL tablet 1 Tablet by SubLINGual route every four (4) hours as needed (irritable bowel). 30 Tablet 5    albuterol (PROVENTIL VENTOLIN) 2.5 mg /3 mL (0.083 %) nebu USE 1 VIAL VIA NEBULIZER THREE TIMES DAILY 90 mL 3    losartan (COZAAR) 50 mg tablet Take 1 Tablet by mouth daily. 90 Tablet 3    amLODIPine (NORVASC) 10 mg tablet Take 1 Tablet by mouth daily. 30 Tablet 5    nitrofurantoin, macrocrystal-monohydrate, (Macrobid) 100 mg capsule Take 1 Capsule by mouth two (2) times a day. 20 Capsule 0    calcitRIOL (ROCALTROL) 0.25 mcg capsule Take 1 Capsule by mouth as directed. Take on non dialysis days      doxercalciferol (HECTOROL IV) 14 mcg by IntraVENous route two (2) days a week.  lidocaine-prilocaine (EMLA) topical cream as directed.  methoxy peg-epoetin beta (MIRCERA INJECTION) 100 mcg.  predniSONE (DELTASONE) 5 mg tablet Take 5 mg by mouth daily.  ondansetron (Zofran ODT) 4 mg disintegrating tablet Take 1 Tablet by mouth every eight (8) hours as needed for Nausea. 14 Tablet 0    Wixela Inhub 250-50 mcg/dose diskus inhaler INHALE 1 PUFF BY MOUTH EVERY 12 HOURS 60 Each 3    RenaPlex-D 800 mcg-12.5 mg -2,000 unit tab Take 1 Tablet by mouth daily.  sevelamer carbonate (RENVELA) 800 mg tab tab Take 800 mg by mouth three (3) times daily.  zolpidem (AMBIEN) 5 mg tablet Take 5 mg by mouth nightly as needed.       dicyclomine (BENTYL) 10 mg capsule TAKE 1 CAPSULE BY MOUTH FOUR TIMES DAILY 120 Cap 1    sucralfate (CARAFATE) 1 gram tablet TAKE 1 TABLET BY MOUTH FOUR TIMES DAILY 360 Tab 0    simvastatin (ZOCOR) 20 mg tablet TAKE 1 TABLET BY MOUTH DAILY AS DIRECTED. 90 Tab 1    sertraline (ZOLOFT) 50 mg tablet Take 1 Tab by mouth daily. 90 Tab 0    aluminum & magnesium hydroxide-simethicone (Maalox Maximum Strength) 400-400-40 mg/5 mL suspension Take 10 mL by mouth every six (6) hours as needed for Indigestion. 250 mL 0    ESTRACE 0.01 % (0.1 mg/gram) vaginal cream INSERT 2 GRAMS INTO VAGINA EVERY MONDAY AND THURSDAY 42.5 g 5    cranberry extract 425 mg cap 425 mg.  LORazepam (ATIVAN) 0.5 mg tablet Take 0.5 mg by mouth daily.  fluticasone (FLONASE) 50 mcg/actuation nasal spray 1 Spray.  EPINEPHrine (EPIPEN) 0.3 mg/0.3 mL injection 0.3 mg.      montelukast (SINGULAIR) 10 mg tablet Take 10 mg by mouth daily. Patient Active Problem List   Diagnosis Code    History of kidney transplant Z94.0    CMV (cytomegalovirus) antibody positive R76.8    Hematuria R31.9    Fecal incontinence R15.9    Drug-induced hyperglycemia R73.9, T50.905A    Diverticulitis of intestine K57.92    Anemia D64.9    Acute renal failure (HCC) N17.9    Asthma J45.909    Exacerbation of asthma J45. 0    Chronic kidney disease, stage III (moderate) ([facility]) N18.30    Chronic obstructive pulmonary disease (HCC) J44.9    Cystic disease of kidney Q61.9    Diverticular disease of large intestine K57.30    Essential hypertension I10    Fever R50.9    Gastritis and duodenitis K29.90    Seasonal allergic rhinitis due to pollen J30.1    Hyperlipidemia E78.5    Hyperkalemia E87.5    Disease due to gram-negative bacillus B96.89    Kidney transplant rejection T86.11    Migraine without status migrainosus, not intractable G43.909    Nausea and vomiting R11.2    Pruritus L29.9    Recurrent urinary tract infection N39.0    UTI (urinary tract infection) N39.0    Renal transplant disorder T86.10    Systemic infection (HCC) A41.9    Systemic inflammatory response syndrome (SIRS) ([facility]) R65.10    Ulcer [id number]    Renal cyst N28.1    Obesity E66.9    Migraine G43.909    Hypertension I10    Hypercholesteremia E78.00  GERD (gastroesophageal reflux disease) K21.9    Burning with urination R30.0    Arrhythmia I49.9    Acute recurrent maxillary sinusitis J01.01    Calculus of gallbladder with acute cholecystitis without obstruction K80.00    Hypothyroidism E03.9    Vertigo R42    ESRD (end stage renal disease) on dialysis ([facility]) N18.6, Z99.2    History of DVT (deep vein thrombosis) Z86.718    Stomatitis K12.1    Thrush of mouth and esophagus ([facility]) B37.81, B37.0    Encounter for cholecystectomy Z76.89    Acute left-sided low back pain without sciatica M54.5    Atrial fibrillation ([facility]) I48.91    Chronic anticoagulation Z79.01    Acute hyperkalemia E87.5    Left leg pain M79.605    Intractable vomiting R11.10      Family History   Problem Relation Age of Onset    Colon Polyps Brother     Cancer Mother     Diabetes Father       Social History     Socioeconomic History    Marital status:      Spouse name: Not on file    Number of children: Not on file    Years of education: Not on file    Highest education level: Not on file   Tobacco Use    Smoking status: Never Smoker    Smokeless tobacco: Never Used   Vaping Use    Vaping Use: Never used   Substance and Sexual Activity    Alcohol use: No    Drug use: No    Sexual activity: Not Currently     Social Determinants of Health     Financial Resource Strain:     Difficulty of Paying Living Expenses:    Food Insecurity:     Worried About Running Out of Food in the Last Year:     Ran Out of Food in the Last Year:    Transportation Needs:     Lack of Transportation (Medical):      Lack of Transportation (Non-Medical):    Physical Activity:     Days of Exercise per Week:     Minutes of Exercise per Session:    Stress:     Feeling of Stress :    Social Connections:     Frequency of Communication with Friends and Family:     Frequency of Social Gatherings with Friends and Family:     Attends [employer] Services:     Active Member of Clubs or Organizations:     Attends Club or Organization Meetings:     Marital Status:       Past Surgical History:   Procedure Laterality Date    COLONOSCOPY      Dr Erika Guzmán  5yrs ago    ENDOSCOPY VISIT-OUTPATIENT      Dr Erika Guzmán  5 yrs ago    ENDOSCOPY, COLON, DIAGNOSTIC      with Dr. Vee Lantigua HX CHOLECYSTECTOMY  02/2021    HX GI      ulcer    HX HEENT      sinus surg    HX RENAL TRANSPLANT      HX TRANSPLANT      kidney transplant 2002    VASCULAR SURGERY PROCEDURE UNLIST      shunt in prep for dialysis      Past Medical History:   Diagnosis Date    Anemia NEC     Arrhythmia     Asthma     Asthma     Burning with urination     frequent uti    Chronic kidney disease     dialysis    CMV (cytomegalovirus) antibody positive     Dialysis patient ([facility] [address]     M/W/F    Dyspepsia and other specified disorders of function of stomach     stomach ulcer    Essential hypertension     GERD (gastroesophageal reflux disease)     Hypercholesteremia     Hypertension     Migraine     Obesity     Renal cyst 2002    kidney transplant     Ulcer         I have reviewed and agree with [address] [place] [address] Nw and ROS and intake form in chart and the record furthermore I have reviewed prior medical record(s) regarding this patients care during this appointment. Review of Systems:   Patient is a pleasant appearing individual, appropriately dressed, well hydrated, well nourished, who is alert, appropriately oriented for age, and in no acute distress with a normal gait and normal affect who does not appear to be in any significant pain. Physical Exam:  The patient's back is neurovascularly intact and appears to have good symmetry on exam with no muscle atrophy noted. Normal curvature of the spine with positive tenderness to palpation. Decreased range of motion in all planes secondary to pain but normal strength. No rashes, echymosis or other skin lesions noted.      The patients bilateral lower extremities are grossly neurovascularly intact with good cap refill, full range of motion and strength. No swelling, echymosis or instability noted. Negative straight leg raise, negative palacios's and negative lachman's. No tenderness to palpation. No foot drop present and patient has a normal gait. Encounter Diagnoses     ICD-10-CM ICD-9-CM   1. Low back pain, unspecified back pain laterality, unspecified chronicity, unspecified whether sciatica present  M54.5 724.2       HPI:  The patient is here with a chief complaint of left leg pain. Status post ultrasound of left and right. She did have a DVT on the right. She is seeing Dr. Jessica Marin for it but having more low back pain. Pain is 8/10. Progressively getting worse. Assessment/Plan:  Plan at this point, we will have her see a spine specialist.  We will see her back as needed. As part of continued conservative pain management options the patient was advised to utilize Tylenol or OTC NSAIDS as long as it is not medically contraindicated. Return to Office: Follow-up and Dispositions    · Return for PRN, we will call. Scribed by Kae Lambert as dictated by Candi Arechiga. Hanna Nj MD.  Documentation True and Accepted Zeeshan Nj MD

## 2021-10-07 ENCOUNTER — APPOINTMENT (OUTPATIENT)
Dept: GENERAL RADIOLOGY | Age: 66
End: 2021-10-07
Attending: STUDENT IN AN ORGANIZED HEALTH CARE EDUCATION/TRAINING PROGRAM
Payer: MEDICARE

## 2021-10-07 ENCOUNTER — HOSPITAL ENCOUNTER (EMERGENCY)
Age: 66
Discharge: HOME | End: 2021-10-07
Attending: STUDENT IN AN ORGANIZED HEALTH CARE EDUCATION/TRAINING PROGRAM
Payer: MEDICARE

## 2021-10-07 VITALS
RESPIRATION RATE: 16 BRPM | WEIGHT: 145 LBS | TEMPERATURE: 98.4 F | HEART RATE: 60 BPM | DIASTOLIC BLOOD PRESSURE: 94 MMHG | SYSTOLIC BLOOD PRESSURE: 155 MMHG | BODY MASS INDEX: 27.38 KG/M2 | OXYGEN SATURATION: 99 % | HEIGHT: 61 IN

## 2021-10-07 DIAGNOSIS — J06.9 VIRAL URI WITH COUGH: Primary | ICD-10-CM

## 2021-10-07 PROCEDURE — 99283 EMERGENCY DEPT VISIT LOW MDM: CPT

## 2021-10-07 PROCEDURE — 71046 X-RAY EXAM CHEST 2 VIEWS: CPT

## 2021-10-07 PROCEDURE — 74011250637 HC RX REV CODE- 250/637: Performed by: STUDENT IN AN ORGANIZED HEALTH CARE EDUCATION/TRAINING PROGRAM

## 2021-10-07 RX ORDER — ACETAMINOPHEN 500 MG
1000 TABLET ORAL ONCE
Status: COMPLETED | OUTPATIENT
Start: 2021-10-07 | End: 2021-10-07

## 2021-10-07 RX ORDER — OXYMETAZOLINE HCL 0.05 %
2 SPRAY, NON-AEROSOL (ML) NASAL
Status: COMPLETED | OUTPATIENT
Start: 2021-10-07 | End: 2021-10-07

## 2021-10-07 RX ADMIN — ACETAMINOPHEN 1000 MG: 500 TABLET ORAL at 15:54

## 2021-10-07 RX ADMIN — OXYMETAZOLINE HCL 2 SPRAY: 0.05 SPRAY NASAL at 15:54

## 2021-10-07 NOTE — ED PROVIDER NOTES
HPI   Patient is a 61-year-old female who presents with approximately 4-day history of multiple complaints. States that over the last 4 days she has had bilateral itchy eyes, runny nose, sneezing and cough. Her cough has been nonproductive. She denies any associated fever, sweats or chills. Denies any headache, chest pain or difficulty breathing. She is a known history of DVTs for which she states that she has been compliant with her Coumadin. She does have a history of seasonal allergies and states that she has been using Flonase but without any significant relief. She is vaccinated against Covid. Her cough has been nonproductive. Nothing else making her symptoms better. She states that time and making it worse.   Past Medical History:   Diagnosis Date    Anemia NEC     Arrhythmia     Asthma     Asthma     Burning with urination     frequent uti    Chronic kidney disease     dialysis    CMV (cytomegalovirus) antibody positive     Dialysis patient ([facility] [address]     M/W/F    Dyspepsia and other specified disorders of function of stomach     stomach ulcer    Essential hypertension     GERD (gastroesophageal reflux disease)     Hypercholesteremia     Hypertension     Migraine     Obesity     Renal cyst 2002    kidney transplant     Ulcer        Past Surgical History:   Procedure Laterality Date    COLONOSCOPY      Dr Olga Hoffman  5yrs ago    ENDOSCOPY VISIT-OUTPATIENT      Dr Olga Hoffman  5 yrs ago    ENDOSCOPY, COLON, DIAGNOSTIC      with Dr. Carolin Padilla  [facility]  02/2021    HX GI      ulcer    HX HEENT      sinus surg    HX RENAL TRANSPLANT      HX TRANSPLANT      kidney transplant 2002    VASCULAR SURGERY PROCEDURE UNLIST      shunt in prep for dialysis         Family History:   Problem Relation Age of Onset    Colon Polyps Brother     Cancer Mother     Diabetes Father        Social History     Socioeconomic History    Marital status:      Spouse name: Not on file    Number of children: Not on file    Years of education: Not on file    Highest education level: Not on file   Occupational History    Not on file   Tobacco Use    Smoking status: Never Smoker    Smokeless tobacco: Never Used   Vaping Use    Vaping Use: Never used   Substance and Sexual Activity    Alcohol use: No    Drug use: No    Sexual activity: Not Currently   Other Topics Concern    Not on file   Social History Narrative    Not on file     Social Determinants of Health     Financial Resource Strain:     Difficulty of Paying Living Expenses:    Food Insecurity:     Worried About Running Out of Food in the Last Year:     [address] in the Last Year:    Transportation Needs:     Lack of Transportation (Medical):  Lack of Transportation (Non-Medical):    Physical Activity:     Days of Exercise per Week:     Minutes of Exercise per Session:    Stress:     Feeling of Stress :    Social Connections:     Frequency of Communication with Friends and Family:     Frequency of Social Gatherings with Friends and Family:     Attends [employer] Services:     Active Member of Clubs or Organizations:     Attends Club or Organization Meetings:     Marital Status:    Intimate Partner Violence:     Fear of Current or Ex-Partner:     Emotionally Abused:     Physically Abused:     Sexually Abused:           ALLERGIES: Aspirin, Bactrim [sulfamethoxazole-trimethoprim], Bromfenac, Cefepime, Ceftriaxone, Copper, Ibuprofen, Ketorolac tromethamine, Morphine, Relafen [nabumetone], Rifampin, and Vancomycin    Review of Systems  Constitutional: No fever  HENT: No ear pain  Eyes: No change in vision  Respiratory: No SOB  Cardio: No chest pain  GI: No blood in stool  : No hematuria  MSK: No back pain  Skin: No rashes  Neuro: No headache    Vitals:    10/07/21 1404   BP: (!) 184/75   Pulse: 67   Resp: 16   Temp: 98.4 °F (36.9 °C)   SpO2: 99%   Weight: 65.8 kg (145 lb)   Height: 5' 1\" (1.549 m)            Physical Exam General: No acute distress  Head: Normocephalic, atraumatic  Psych: Cooperative and alert  Eyes: No scleral icterus, normal conjunctiva, no discharge  ENT: Moist oral mucosa, no erythema to posterior pharynx, mild rhinorrhea without sinus tenderness, nasal mucosa is more pale  Neck: Supple  CV: Regular rate and rhythm, no pitting edema, palpable radial pulses  Pulm: Clear breath sounds bilaterally without any wheezing or rhonchi, normal respiratory rate  GI: Normal bowel sounds, soft, non-tender  MSK: Moves all four extremities  Skin: No rashes  Neuro: Alert and conversive    [facility]   Patient is a 72-year-old female who presents with multiple symptoms consistent most with a viral URI versus seasonal allergies. Patient states that she has been using Flonase without any significant improvement in her symptoms therefore will point more towards a viral URI. She states that she has a cough, will obtain a chest x-ray to look for signs of bacterial pneumonia though my suspicion for this is low. Of note patient is noted to be hypertensive she is asymptomatic from the standpoint and she was encouraged to follow-up with her primary care physician. Encouraged her to make sure she takes all of her medications. She will be given Afrin and Tylenol for symptoms. Chest x-ray shows no acute cardiopulmonary process. Upon reexamination patient states that she is feeling better. She request cough medication I discussed with her why do not use cough medication. We discussed different regimens to help with her symptoms. Encouraged her to follow-up with her primary care physician. We encouraged her to get tested at outpatient facility for Covid. Patient stable for discharge at this time. Patient is in agreement with the plan to be discharged at this time. All the patient's questions were answered. Patient was given written instructions on the diagnosis, and states understanding of the plan moving forward.   We did discuss important signs and symptoms that should prompt quick return to the emergency department. Disposition: Patient was discharged home in stable condition.   They will follow up with their primary care physician    Prescriptions: None    Diagnosis: Acute viral URI, possibly Covid  Procedures

## 2021-10-07 NOTE — ED TRIAGE NOTES
Pt states she started with itchy eyes, sneezing,sore throat and cough about 3 days ago, pt made appt with PCP for next week, but states she didn't want to wait. Pt denies fevers.   Pt is vaccinated for COVID

## 2021-10-26 NOTE — PROGRESS NOTES
[facility]  [address], [place]   Phone: [phone]  Fax: [phone]        INITIAL CONSULTATION      HISTORY OF PRESENT ILLNESS:  Shaunna Youssef is a 61 y.o. female whom is referred from Deandra Ricardo MD secondary to low back pain radiating into the LLE in a L5 distribution to the knee x 3 months without injury. States her pain is holding steady. She rates her pain 8/10. Her pain is aggravated with walking. Positive shopping cart sign. Pt is accompanied by her daughter. Pt denies change in bowel or bladder habits. Pt denies fever, weight loss, or skin changes. Pt denies taking antibiotics. Pt admits to previous spinal injections x 2 years through Dr. Aundrea West secondary to low back pain. She reports improvement with the injection. Patient denies previous spinal surgery or physical therapy/chiropractic care. She denies treating with any medications at this time. Pt is taking Eliquis and is followed by a Cardiologist, name unknown. Pt is taking Prednisone 5 mg daily secondary to kidney transplant. Per Dr. Browning Player note, pt has a RLE DVT but per pt she has been cleared by Dr. Clifton Espinoza. PmHx of kidney transplant in 2002, bowel incontinence, obesity, A-fib, chronic anticoagulation, HTN, migraines. The pt states she is currently on dialysis, 2x/week and is followed by Dr. Damaris Jacobs. Pt states she has been recieving dialysis for about 2 years. Note from Deandra Ricardo MD dated 9/28/2021 indicating patient was seen with c/o left leg pain. She had a DVT on the right. Followed by Dr. Renuka Gordon secondary to DVT. Pain 8/10. Lumbar spine CT scan dated 4/23/2020. Not independently reviewed. Unfortunately, films are not found in the system. Per report, severe multifactorial canal stenosis at L5-S1. Complete effacement of the left lateral recess at L5-S1 with impingement of the descending left S1 nerve root.  Moderate canal stenosis is present at L4-5 with high grade effacement of both lateral recesses. Pt is scheduled for a L spine MRI on 11/2/2021. The patient is RHD.  reviewed. Body mass index is 27.51 kg/m². PCP: Wu Morejon MD    Past Medical History:   Diagnosis Date    Anemia NEC     Arrhythmia     Asthma     Asthma     Burning with urination     frequent uti    Chronic kidney disease     dialysis    CMV (cytomegalovirus) antibody positive     Dialysis patient ([address]     M/W/F    Dyspepsia and other specified disorders of function of stomach     stomach ulcer    Essential hypertension     GERD (gastroesophageal reflux disease)     Hypercholesteremia     Hypertension     Migraine     Obesity     Renal cyst 2002    kidney transplant     Ulcer           Past Surgical History:   Procedure Laterality Date    COLONOSCOPY      Dr Eugenia Willingham  5yrs ago    ENDOSCOPY VISIT-OUTPATIENT      Dr Eugenia Willingham  5 yrs ago    ENDOSCOPY, COLON, DIAGNOSTIC      with Dr. Dominic Elias HX CHOLECYSTECTOMY  02/2021    HX GI      ulcer    HX HEENT      sinus surg    HX RENAL TRANSPLANT      HX TRANSPLANT      kidney transplant 2002    VASCULAR SURGERY PROCEDURE UNLIST      shunt in prep for dialysis         Social History     Tobacco Use    Smoking status: Never Smoker    Smokeless tobacco: Never Used   Substance Use Topics    Alcohol use: No       Work status: N/A. Marital status: N/A      Current Outpatient Medications   Medication Sig Dispense Refill    amiodarone (CORDARONE) 200 mg tablet TAKE 1 TABLET BY MOUTH EVERY DAY 90 Tablet 2    valGANciclovir (VALCYTE) 450 mg tablet TAKE 2 TABLETS BY MOUTH DAILY 90 Tablet 5    apixaban (Eliquis DVT-PE Treat 30D Start) 5 mg (74 tabs) starter pack Take 5 mg by mouth twice a day for 7 days Followed by 2.5 mg by mouth twice a day 1 Dose Pack 0    levothyroxine (SYNTHROID) 50 mcg tablet Take 1 Tablet by mouth daily.  90 Tablet 3    meclizine (ANTIVERT) 25 mg tablet TAKE 1 TABLET BY MOUTH THREE TIMES DAILY FOR UP TO 10 DAYS AS NEEDED FOR DIZZINESS 21 Tablet 0    carvediloL (COREG) 6.25 mg tablet Take 1 Tablet by mouth two (2) times daily (with meals). 180 Tablet 2    dexlansoprazole (Dexilant) 60 mg CpDB capsule (delayed release) Take 1 Capsule by mouth daily. 30 Capsule 5    hyoscyamine SL (LEVSIN/SL) 0.125 mg SL tablet 1 Tablet by SubLINGual route every four (4) hours as needed (irritable bowel). 30 Tablet 5    albuterol (PROVENTIL VENTOLIN) 2.5 mg /3 mL (0.083 %) nebu USE 1 VIAL VIA NEBULIZER THREE TIMES DAILY 90 mL 3    losartan (COZAAR) 50 mg tablet Take 1 Tablet by mouth daily. 90 Tablet 3    amLODIPine (NORVASC) 10 mg tablet Take 1 Tablet by mouth daily. 30 Tablet 5    calcitRIOL (ROCALTROL) 0.25 mcg capsule Take 1 Capsule by mouth as directed. Take on non dialysis days      doxercalciferol (HECTOROL IV) 14 mcg by IntraVENous route two (2) days a week.  lidocaine-prilocaine (EMLA) topical cream as directed.  methoxy peg-epoetin beta (MIRCERA INJECTION) 100 mcg.  predniSONE (DELTASONE) 5 mg tablet Take 5 mg by mouth daily.  ondansetron (Zofran ODT) 4 mg disintegrating tablet Take 1 Tablet by mouth every eight (8) hours as needed for Nausea. 14 Tablet 0    Wixela Inhub 250-50 mcg/dose diskus inhaler INHALE 1 PUFF BY MOUTH EVERY 12 HOURS 60 Each 3    RenaPlex-D 800 mcg-12.5 mg -2,000 unit tab Take 1 Tablet by mouth daily.  sevelamer carbonate (RENVELA) 800 mg tab tab Take 800 mg by mouth three (3) times daily.  zolpidem (AMBIEN) 5 mg tablet Take 5 mg by mouth nightly as needed.  dicyclomine (BENTYL) 10 mg capsule TAKE 1 CAPSULE BY MOUTH FOUR TIMES DAILY 120 Cap 1    sucralfate (CARAFATE) 1 gram tablet TAKE 1 TABLET BY MOUTH FOUR TIMES DAILY 360 Tab 0    simvastatin (ZOCOR) 20 mg tablet TAKE 1 TABLET BY MOUTH DAILY AS DIRECTED. 90 Tab 1    sertraline (ZOLOFT) 50 mg tablet Take 1 Tab by mouth daily.  90 Tab 0    aluminum & magnesium hydroxide-simethicone (Maalox Maximum Strength) 400-400-40 mg/5 mL suspension Take 10 mL by mouth every six (6) hours as needed for Indigestion. 250 mL 0    ESTRACE 0.01 % (0.1 mg/gram) vaginal cream INSERT 2 GRAMS INTO VAGINA EVERY MONDAY AND THURSDAY 42.5 g 5    cranberry extract 425 mg cap 425 mg.  LORazepam (ATIVAN) 0.5 mg tablet Take 0.5 mg by mouth daily.  fluticasone (FLONASE) 50 mcg/actuation nasal spray 1 Spray.  EPINEPHrine (EPIPEN) 0.3 mg/0.3 mL injection 0.3 mg.      montelukast (SINGULAIR) 10 mg tablet Take 10 mg by mouth daily. Allergies   Allergen Reactions    Aspirin Rash and Unknown (comments)    Bactrim [Sulfamethoxazole-Trimethoprim] Rash and Unknown (comments)    Bromfenac Rash and Unknown (comments)    Cefepime Other (comments)     Neurological reaction    Ceftriaxone Rash    Copper Rash    Ibuprofen Rash and Unknown (comments)    Ketorolac Tromethamine Rash and Unknown (comments)    Morphine Rash and Unknown (comments)    Relafen [Nabumetone] Rash and Unknown (comments)    Rifampin Rash and Unknown (comments)    Vancomycin Rash and Unknown (comments)     Pt reports causes itching, takes benadryl prior to use. Pt denies anaphylaxis. Requires benadryl with each vancomycin dose            Family History   Problem Relation Age of Onset    Colon Polyps Brother     Cancer Mother     Diabetes Father          REVIEW OF SYSTEMS  Constitutional symptoms: Negative  Eyes: Negative  Ears, Nose, Throat, and Mouth: Negative  Cardiovascular: Negative  Respiratory: Negative  Genitourinary: Negative  Integumentary (Skin and/or breast): Negative  Musculoskeletal: Positive for low back and LLE  Extremities: Negative for edema.   Endocrine/Rheumatologic: Negative  Hematologic/Lymphatic: Negative  Allergic/Immunologic: Negative  Psychiatric: Negative       PHYSICAL EXAMINATION  Visit Vitals  Pulse 68   Temp 98 °F (36.7 °C) (Temporal)   Ht 5' 1\" (1.549 m)   Wt 145 lb 9.6 oz (66 kg)   SpO2 100%   BMI 27.51 kg/m²       CONSTITUTIONAL: NAD, A&O x 3  HEART: Regular rate and rhythm. Postive thrill over AV shunt  GASTROINTESTINAL: Positive bowel sounds, soft, nontender, and nondistended  LUNGS: Clear to auscultation bilaterally. SKIN: Negative for rash. RANGE OF MOTION: The patient has full passive range of motion in all four extremities. SENSATION: Sensation is intact to light touch throughout. MOTOR:   Straight Leg Raise: Negative, bilateral  Avalos: Positive, right  Tandem Gait: Neg. Deep tendon reflexes are 2 at the biceps, 1 at the brachioradialis and 1 on the right and 2 on the left at the triceps. Deep tendon reflexes are 2 at the knees and 1 on the right and 0 on the left at the ankles bilaterally. Shoulder AB/Flex Elbow Flex Wrist Ext Elbow Ext Wrist Flex Hand Intrin Tone   Right +4/5 +4/5 +4/5 +4/5 +4/5 +4/5 +4/5   Left +4/5 +4/5 +4/5 +4/5 +4/5 +4/5 +4/5              Hip Flex Knee Ext Knee Flex Ankle DF GTE Ankle PF Tone   Right +4/5 +4/5 +4/5 +4/5 +4/5 +4/5 +4/5   Left +4/5 +4/5 +4/5 +4/5 +4/5 +4/5 +4/5         ASSESSMENT   Diagnoses and all orders for this visit:    1. Facet arthropathy    2. Spinal stenosis of lumbar region, unspecified whether neurogenic claudication present  -     methylPREDNISolone (MEDROL DOSEPACK) 4 mg tablet; Per dose pack instructions    3. DDD (degenerative disc disease), lumbar    4. Lumbar neuritis  -     methylPREDNISolone (MEDROL DOSEPACK) 4 mg tablet; Per dose pack instructions           IMPRESSIONS/RECOMMENDATIONS:  Patient presents today with c/o  low back pain radiating into the LLE in a L5 distribution to the knee x 3 months without injury . Multiple treatment options were discussed. I will have the patient sign a release of medical information to obtain records from Dr. Henry Espinoza. Pt should follow through with the L spine MRI as scheduled by Dr. Tameka Santos. I advised patient to bring copies of films to next visit. In the interim, I will start her on Medrol Dosepak.  She should continue taking her Prednisone along with the MDP. I will consult with Dr. Kay Saab, nephrology with regards to her safe dose of Neurontin secondary to kidney dysfunction. Patient is neurologically intact. I will see the patient back following MRI or earlier if needed. Written by Nigel Cochran, as dictated by Lisa Rangel MD  I examined the patient, reviewed and agree with the note.

## 2021-10-27 ENCOUNTER — OFFICE VISIT (OUTPATIENT)
Dept: ORTHOPEDIC SURGERY | Age: 66
End: 2021-10-27
Payer: MEDICARE

## 2021-10-27 ENCOUNTER — TELEPHONE (OUTPATIENT)
Dept: ORTHOPEDIC SURGERY | Age: 66
End: 2021-10-27

## 2021-10-27 VITALS
TEMPERATURE: 98 F | WEIGHT: 145.6 LBS | HEART RATE: 68 BPM | BODY MASS INDEX: 27.49 KG/M2 | OXYGEN SATURATION: 100 % | HEIGHT: 61 IN

## 2021-10-27 DIAGNOSIS — M54.16 LUMBAR NEURITIS: ICD-10-CM

## 2021-10-27 DIAGNOSIS — M48.061 SPINAL STENOSIS OF LUMBAR REGION, UNSPECIFIED WHETHER NEUROGENIC CLAUDICATION PRESENT: ICD-10-CM

## 2021-10-27 DIAGNOSIS — M51.36 DDD (DEGENERATIVE DISC DISEASE), LUMBAR: ICD-10-CM

## 2021-10-27 DIAGNOSIS — M47.819 FACET ARTHROPATHY: Primary | ICD-10-CM

## 2021-10-27 PROCEDURE — 3017F COLORECTAL CA SCREEN DOC REV: CPT | Performed by: PHYSICAL MEDICINE & REHABILITATION

## 2021-10-27 PROCEDURE — G8419 CALC BMI OUT NRM PARAM NOF/U: HCPCS | Performed by: PHYSICAL MEDICINE & REHABILITATION

## 2021-10-27 PROCEDURE — G9899 SCRN MAM PERF RSLTS DOC: HCPCS | Performed by: PHYSICAL MEDICINE & REHABILITATION

## 2021-10-27 PROCEDURE — G8432 DEP SCR NOT DOC, RNG: HCPCS | Performed by: PHYSICAL MEDICINE & REHABILITATION

## 2021-10-27 PROCEDURE — G8427 DOCREV CUR MEDS BY ELIG CLIN: HCPCS | Performed by: PHYSICAL MEDICINE & REHABILITATION

## 2021-10-27 PROCEDURE — 99204 OFFICE O/P NEW MOD 45 MIN: CPT | Performed by: PHYSICAL MEDICINE & REHABILITATION

## 2021-10-27 PROCEDURE — G9231 DOC ESRD DIA TRANS PREG: HCPCS | Performed by: PHYSICAL MEDICINE & REHABILITATION

## 2021-10-27 RX ORDER — METHYLPREDNISOLONE 4 MG/1
TABLET ORAL
Qty: 1 DOSE PACK | Refills: 0 | Status: SHIPPED | OUTPATIENT
Start: 2021-10-27 | End: 2021-11-23 | Stop reason: ALTCHOICE

## 2021-10-27 NOTE — TELEPHONE ENCOUNTER
[facility] for Dr Lona Lei ([phone]), asking if Dr Michael Hernandez can put patient on gabapentin and if so what is the maximum safe dose.

## 2021-10-27 NOTE — LETTER
[id number]    Patient: Dior Anguiano   YOB: 1955   Date of Visit: 10/27/2021     Clifton Riggins, [address]  [address] [address]  Via [address] [facility] [address]    Dear Clifton Riggins MD,      Thank you for referring Ms. Cuca Guardado to [address] for evaluation. My notes for this consultation are attached. If you have questions, please do not hesitate to call me. I look forward to following your patient along with you.       Sincerely,    Noemi Hawthorne MD

## 2021-10-29 RX ORDER — GABAPENTIN 300 MG/1
300 CAPSULE ORAL 3 TIMES DAILY
Qty: 60 CAPSULE | Refills: 0 | Status: CANCELLED | OUTPATIENT
Start: 2021-10-29

## 2021-10-29 RX ORDER — GABAPENTIN 100 MG/1
100 CAPSULE ORAL 3 TIMES DAILY
Qty: 90 CAPSULE | Refills: 1 | Status: SHIPPED | OUTPATIENT
Start: 2021-10-29 | End: 2021-12-22 | Stop reason: SDUPTHER

## 2021-10-29 NOTE — TELEPHONE ENCOUNTER
Per Dr Estephania Medeiros the patient can safely take maximum 300mg of gabapentin twice daily. His nurse informed me that they got this information out of the PDR. .. Amari Ivory

## 2021-11-22 NOTE — PROGRESS NOTES
[facility]  [address], [place]   Phone: [phone]  Fax: [phone]        PROGRESS NOTE      HISTORY OF PRESENT ILLNESS:  The patient is a 61 y.o. female and was seen today for follow up of  low back pain radiating into the LLE in a L5 distribution to the knee x 3 months without injury. States her pain is holding steady. Her pain is aggravated with walking. Positive shopping cart sign. Pt is accompanied by her daughter. Pt denies change in bowel or bladder habits. Pt denies fever, weight loss, or skin changes. Pt denies taking antibiotics. Pt admits to previous spinal injections x 2 years through Dr. Kevin Read secondary to low back pain. She reports improvement with the injection. Patient denies previous spinal surgery or physical therapy/chiropractic care. She denies treating with any medications at this time. Pt is taking Eliquis and is followed by, Dr. Rhianna Peterson, cardiology, telephone number: [phone]. Pt is taking Prednisone 5 mg daily secondary to kidney transplant. Per Dr. Rufus Catalan note, pt has a RLE DVT but per pt she has been cleared by Dr. Dudley Peralta. The patient is RHD. PmHx of kidney transplant in 2002, obesity, A-fib, chronic anticoagulation, HTN, migraines. The pt states she is currently on dialysis, 2x/week and is followed by Dr. Otto Hoffman. Pt states she has been recieving dialysis for about 2 years. Note from Amando Bernabe MD dated 9/28/2021 indicating patient was seen with c/o left leg pain. She had a DVT on the right. Followed by Dr. Jim Gambino secondary to DVT. Pain 8/10. Lumbar spine CT scan dated 4/23/2020. Not independently reviewed. Unfortunately, films are not found in the system. Per report, severe multifactorial canal stenosis at L5-S1. Complete effacement of the left lateral recess at L5-S1 with impingement of the descending left S1 nerve root. Moderate canal stenosis is present at L4-5 with high grade effacement of both lateral recesses.  Pt is scheduled for a L spine MRI on 11/2/2021. At her last clinical appointment, I had the patient sign a release of medical information to obtain records from Dr. Angelica Motley. Pt should follow through with the L spine MRI as scheduled by Dr. Fabi Castillo. I advised patient to bring copies of films to next visit. In the interim, I started her on Medrol Dosepak. She should have continued taking her Prednisone along with the MDP. I consulted with Dr. Elena Leblanc, nephrology with regards to her safe dose of Neurontin secondary to kidney dysfunction. The patient returns today with centralized low back pain. She rates her back pain 8/10, previously 8/10. Per Dr. Elena Leblanc, the pt's max safe dose of Neurontin is 300 mg BID. She was started on Neurontin 100 mg TID on 10/29/2021. She is tolerating the medication well with good relief of LLE pain. She additionally completed the MDP with benefit. No records available from Dr. Angelica Motley at this time. Pt denies change in bowel or bladder habits. Lumbar spine MRI dated 11/2/2021 films independently reviewed. Per report, multilevel degenerative disc and degenerative facet disease. Areas of central canal stenosis are minimal but there is severe left lateral recess stenosis at L5-S1 with associated impingement upon the descending left S1 nerve root. Mild areas of foraminal encroachment are present with left lateral disc osteophyte complex at L5-S1 mildly displacing the exiting left L5 nerve root.  reviewed. Body mass index is 29.48 kg/m².     PCP: Olivia Vazquez MD      Past Medical History:   Diagnosis Date    Anemia NEC     Arrhythmia     Asthma     Asthma     Burning with urination     frequent uti    Chronic kidney disease     dialysis    CMV (cytomegalovirus) antibody positive     Dialysis patient ([facility] [address]     M/W/F    Dyspepsia and other specified disorders of function of stomach     stomach ulcer    Essential hypertension     GERD (gastroesophageal reflux disease)     Hypercholesteremia  Hypertension     Migraine     Obesity     Renal cyst 2002    kidney transplant     Ulcer         Social History     Socioeconomic History    Marital status:      Spouse name: Not on file    Number of children: Not on file    Years of education: Not on file    Highest education level: Not on file   Occupational History    Not on file   Tobacco Use    Smoking status: Never Smoker    Smokeless tobacco: Never Used   Vaping Use    Vaping Use: Never used   Substance and Sexual Activity    Alcohol use: No    Drug use: No    Sexual activity: Not Currently   Other Topics Concern    Not on file   Social History Narrative    Not on file     Social Determinants of Health     Financial Resource Strain:     Difficulty of Paying Living Expenses: Not on file   Food Insecurity:     Worried About [address] in the Last Year: Not on file    [address] [place] in the Last Year: Not on file   Transportation Needs:     Lack of Transportation (Medical): Not on file    Lack of Transportation (Non-Medical):  Not on file   Physical Activity:     Days of Exercise per Week: Not on file    Minutes of Exercise per Session: Not on file   Stress:     Feeling of Stress : Not on file   Social Connections:     Frequency of Communication with Friends and Family: Not on file    Frequency of Social Gatherings with Friends and Family: Not on file    Attends Worship Services: Not on file    Active Member of [address] [place] [address] or Organizations: Not on file    Attends Club or Organization Meetings: Not on file    Marital Status: Not on file   Intimate Partner Violence:     Fear of Current or Ex-Partner: Not on file    Emotionally Abused: Not on file    Physically Abused: Not on file    Sexually Abused: Not on file   Housing Stability:     Unable to Pay for Housing in the Last Year: Not on file    Number of [place] in the Last Year: Not on file    Unstable Housing in the Last Year: Not on file       Current Outpatient Medications   Medication Sig Dispense Refill    gabapentin (NEURONTIN) 100 mg capsule Take 1 Capsule by mouth three (3) times daily. Max Daily Amount: 300 mg. 90 Capsule 1    amiodarone (CORDARONE) 200 mg tablet TAKE 1 TABLET BY MOUTH EVERY DAY 90 Tablet 2    valGANciclovir (VALCYTE) 450 mg tablet TAKE 2 TABLETS BY MOUTH DAILY 90 Tablet 5    apixaban (Eliquis DVT-PE Treat 30D Start) 5 mg (74 tabs) starter pack Take 5 mg by mouth twice a day for 7 days Followed by 2.5 mg by mouth twice a day 1 Dose Pack 0    levothyroxine (SYNTHROID) 50 mcg tablet Take 1 Tablet by mouth daily. 90 Tablet 3    meclizine (ANTIVERT) 25 mg tablet TAKE 1 TABLET BY MOUTH THREE TIMES DAILY FOR UP TO 10 DAYS AS NEEDED FOR DIZZINESS 21 Tablet 0    carvediloL (COREG) 6.25 mg tablet Take 1 Tablet by mouth two (2) times daily (with meals). 180 Tablet 2    dexlansoprazole (Dexilant) 60 mg CpDB capsule (delayed release) Take 1 Capsule by mouth daily. 30 Capsule 5    hyoscyamine SL (LEVSIN/SL) 0.125 mg SL tablet 1 Tablet by SubLINGual route every four (4) hours as needed (irritable bowel). 30 Tablet 5    albuterol (PROVENTIL VENTOLIN) 2.5 mg /3 mL (0.083 %) nebu USE 1 VIAL VIA NEBULIZER THREE TIMES DAILY 90 mL 3    losartan (COZAAR) 50 mg tablet Take 1 Tablet by mouth daily. 90 Tablet 3    amLODIPine (NORVASC) 10 mg tablet Take 1 Tablet by mouth daily. 30 Tablet 5    calcitRIOL (ROCALTROL) 0.25 mcg capsule Take 1 Capsule by mouth as directed. Take on non dialysis days      doxercalciferol (HECTOROL IV) 14 mcg by IntraVENous route two (2) days a week.  lidocaine-prilocaine (EMLA) topical cream as directed.  Wixela Inhub 250-50 mcg/dose diskus inhaler INHALE 1 PUFF BY MOUTH EVERY 12 HOURS 60 Each 3    RenaPlex-D 800 mcg-12.5 mg -2,000 unit tab Take 1 Tablet by mouth daily.  sevelamer carbonate (RENVELA) 800 mg tab tab Take 800 mg by mouth three (3) times daily.       zolpidem (AMBIEN) 5 mg tablet Take 5 mg by mouth nightly as needed.  dicyclomine (BENTYL) 10 mg capsule TAKE 1 CAPSULE BY MOUTH FOUR TIMES DAILY 120 Cap 1    sucralfate (CARAFATE) 1 gram tablet TAKE 1 TABLET BY MOUTH FOUR TIMES DAILY 360 Tab 0    simvastatin (ZOCOR) 20 mg tablet TAKE 1 TABLET BY MOUTH DAILY AS DIRECTED. 90 Tab 1    sertraline (ZOLOFT) 50 mg tablet Take 1 Tab by mouth daily. 90 Tab 0    aluminum & magnesium hydroxide-simethicone (Maalox Maximum Strength) 400-400-40 mg/5 mL suspension Take 10 mL by mouth every six (6) hours as needed for Indigestion. 250 mL 0    ESTRACE 0.01 % (0.1 mg/gram) vaginal cream INSERT 2 GRAMS INTO VAGINA EVERY MONDAY AND THURSDAY 42.5 g 5    cranberry extract 425 mg cap 425 mg.  LORazepam (ATIVAN) 0.5 mg tablet Take 0.5 mg by mouth daily.  fluticasone (FLONASE) 50 mcg/actuation nasal spray 1 Spray.  EPINEPHrine (EPIPEN) 0.3 mg/0.3 mL injection 0.3 mg.      montelukast (SINGULAIR) 10 mg tablet Take 10 mg by mouth daily. Allergies   Allergen Reactions    Aspirin Rash and Unknown (comments)    Bactrim [Sulfamethoxazole-Trimethoprim] Rash and Unknown (comments)    Bromfenac Rash and Unknown (comments)    Cefepime Other (comments)     Neurological reaction    Ceftriaxone Rash    Copper Rash    Ibuprofen Rash and Unknown (comments)    Ketorolac Tromethamine Rash and Unknown (comments)    Morphine Rash and Unknown (comments)    Relafen [Nabumetone] Rash and Unknown (comments)    Rifampin Rash and Unknown (comments)    Vancomycin Rash and Unknown (comments)     Pt reports causes itching, takes benadryl prior to use. Pt denies anaphylaxis.     Requires benadryl with each vancomycin dose          PHYSICAL EXAMINATION    Visit Vitals  Pulse 71   Temp 98.1 °F (36.7 °C)   Resp 18   Wt 156 lb (70.8 kg)   SpO2 98%   BMI 29.48 kg/m²       CONSTITUTIONAL: NAD, A&O x 3  SENSATION: Intact to light touch throughout  RANGE OF MOTION: The patient has full passive range of motion in all four extremities. MOTOR:  Straight Leg Raise: Negative, bilateral               Hip Flex Knee Ext Knee Flex Ankle DF GTE Ankle PF Tone   Right +4/5 +4/5 +4/5 +4/5 +4/5 +4/5 +4/5   Left +4/5 +4/5 +4/5 +4/5 +4/5 +4/5 +4/5       ASSESSMENT   Diagnoses and all orders for this visit:    1. Facet arthropathy    2. DDD (degenerative disc disease), lumbar    3. Spinal stenosis of lumbar region, unspecified whether neurogenic claudication present    4. Lumbar neuritis      IMPRESSION AND PLAN:  Patient returns to the office today with c/o centralized low back pain. Multiple treatment options were discussed. I offered blocks, pt wished to proceed, pending approval from Dr. Zhanna Hunt secondary to taking Eliquis. I will order an epidural at L4-5. I will increase her Neurontin 100 mg TID to 200 mg TID. Patient advised to call office if intolerant to higher dose. I advised the pt to request records from Dr. Sofi Vásquez as we have requested records from his office multiple times. Patient is neurologically intact. I will see the patient back following block or earlier if needed. Written by Nigel Cochran, as dictated by Lisa Rangel MD  I examined the patient, reviewed and agree with the note.

## 2021-11-23 ENCOUNTER — OFFICE VISIT (OUTPATIENT)
Dept: ORTHOPEDIC SURGERY | Age: 66
End: 2021-11-23
Payer: MEDICARE

## 2021-11-23 VITALS
OXYGEN SATURATION: 98 % | HEART RATE: 71 BPM | RESPIRATION RATE: 18 BRPM | BODY MASS INDEX: 29.48 KG/M2 | WEIGHT: 156 LBS | TEMPERATURE: 98.1 F

## 2021-11-23 DIAGNOSIS — M51.36 DDD (DEGENERATIVE DISC DISEASE), LUMBAR: ICD-10-CM

## 2021-11-23 DIAGNOSIS — M47.819 FACET ARTHROPATHY: Primary | ICD-10-CM

## 2021-11-23 DIAGNOSIS — M48.061 SPINAL STENOSIS OF LUMBAR REGION, UNSPECIFIED WHETHER NEUROGENIC CLAUDICATION PRESENT: ICD-10-CM

## 2021-11-23 DIAGNOSIS — M54.16 LUMBAR NEURITIS: ICD-10-CM

## 2021-11-23 PROCEDURE — G8432 DEP SCR NOT DOC, RNG: HCPCS | Performed by: PHYSICAL MEDICINE & REHABILITATION

## 2021-11-23 PROCEDURE — 99214 OFFICE O/P EST MOD 30 MIN: CPT | Performed by: PHYSICAL MEDICINE & REHABILITATION

## 2021-11-23 PROCEDURE — G9899 SCRN MAM PERF RSLTS DOC: HCPCS | Performed by: PHYSICAL MEDICINE & REHABILITATION

## 2021-11-23 PROCEDURE — 3017F COLORECTAL CA SCREEN DOC REV: CPT | Performed by: PHYSICAL MEDICINE & REHABILITATION

## 2021-11-23 PROCEDURE — G9231 DOC ESRD DIA TRANS PREG: HCPCS | Performed by: PHYSICAL MEDICINE & REHABILITATION

## 2021-11-23 PROCEDURE — G8427 DOCREV CUR MEDS BY ELIG CLIN: HCPCS | Performed by: PHYSICAL MEDICINE & REHABILITATION

## 2021-11-23 PROCEDURE — G8419 CALC BMI OUT NRM PARAM NOF/U: HCPCS | Performed by: PHYSICAL MEDICINE & REHABILITATION

## 2021-11-23 RX ORDER — GABAPENTIN 100 MG/1
CAPSULE ORAL
Qty: 180 CAPSULE | Refills: 1 | Status: SHIPPED | OUTPATIENT
Start: 2021-11-23 | End: 2022-01-20

## 2021-11-23 NOTE — LETTER
17/70/0593    Patient: Su Reyna   YOB: 1955   Date of Visit: 11/23/2021     Jasmin Hand, [address]  [address] [address]  [address] [facility] [address]    Dear Jasmin Hand MD,      Thank you for referring Ms. Paula Hillman to [facility] [address] for evaluation. My notes for this consultation are attached. If you have questions, please do not hesitate to call me. I look forward to following your patient along with you.       Sincerely,    Nigel Rao MD

## 2021-11-30 DIAGNOSIS — R42 DIZZINESS: ICD-10-CM

## 2021-11-30 DIAGNOSIS — K21.9 GASTROESOPHAGEAL REFLUX DISEASE WITHOUT ESOPHAGITIS: ICD-10-CM

## 2021-11-30 RX ORDER — MECLIZINE HYDROCHLORIDE 25 MG/1
TABLET ORAL
Qty: 21 TABLET | Refills: 0 | Status: SHIPPED | OUTPATIENT
Start: 2021-11-30 | End: 2022-01-10

## 2021-11-30 RX ORDER — SUCRALFATE 1 G/1
TABLET ORAL
Qty: 360 TABLET | Refills: 0 | Status: SHIPPED | OUTPATIENT
Start: 2021-11-30 | End: 2021-12-20

## 2021-11-30 RX ORDER — FAMOTIDINE 20 MG/1
TABLET, FILM COATED ORAL
Qty: 30 TABLET | Refills: 1 | Status: SHIPPED | OUTPATIENT
Start: 2021-11-30 | End: 2022-03-08

## 2021-12-01 ENCOUNTER — TELEPHONE (OUTPATIENT)
Dept: ORTHOPEDIC SURGERY | Age: 66
End: 2021-12-01

## 2021-12-01 NOTE — TELEPHONE ENCOUNTER
Spoke to patient daughter Ines Mackey ( Has PHI) and she had a question re Neurontin increased at last visit on 11/23/2021, She said that nothing has been sent in to her pharmacy ( [facility] ) Please call to advise [phone]

## 2021-12-01 NOTE — TELEPHONE ENCOUNTER
I CALLED AND LEFT A VOICE MAIL FOR DR HE TO SEE IF PT COULD D/C HER ELIQUIS 3 DAYS PRIOR TO PROCEDURE. CHRISTIANE, DR NEELY'S OFFICE CALLED ME BACK AND TOLD ME THAT SHE WAS UNABLE TO REACH THIS PATIENT TO LET HER KNOW, BYUT DR NEELY WANTS THE PATIENT TO STOP THE ELIQUIS PERMANENTLY. SHE SAID THAT THE PATIENT HAS A APPT WITH DR NEELY SO THEY WILL LET HER KNOW AGAIN AS WELL.  PATIENT WAS ADVISED OF THE ABOVE ON 12/1/2021

## 2021-12-09 ENCOUNTER — APPOINTMENT (OUTPATIENT)
Dept: GENERAL RADIOLOGY | Age: 66
End: 2021-12-09
Attending: PHYSICAL MEDICINE & REHABILITATION
Payer: MEDICARE

## 2021-12-09 ENCOUNTER — HOSPITAL ENCOUNTER (OUTPATIENT)
Age: 66
Setting detail: OUTPATIENT SURGERY
Discharge: HOME | End: 2021-12-09
Attending: PHYSICAL MEDICINE & REHABILITATION | Admitting: PHYSICAL MEDICINE & REHABILITATION
Payer: MEDICARE

## 2021-12-09 VITALS
RESPIRATION RATE: 16 BRPM | SYSTOLIC BLOOD PRESSURE: 160 MMHG | DIASTOLIC BLOOD PRESSURE: 83 MMHG | TEMPERATURE: 98 F | HEART RATE: 77 BPM | OXYGEN SATURATION: 97 %

## 2021-12-09 PROCEDURE — 74011250637 HC RX REV CODE- 250/637: Performed by: PHYSICAL MEDICINE & REHABILITATION

## 2021-12-09 PROCEDURE — 2709999900 HC NON-CHARGEABLE SUPPLY: Performed by: PHYSICAL MEDICINE & REHABILITATION

## 2021-12-09 PROCEDURE — 77030014124 HC TY EPDRL BBMI -A: Performed by: PHYSICAL MEDICINE & REHABILITATION

## 2021-12-09 PROCEDURE — 62323 NJX INTERLAMINAR LMBR/SAC: CPT | Performed by: PHYSICAL MEDICINE & REHABILITATION

## 2021-12-09 PROCEDURE — 74011250636 HC RX REV CODE- 250/636: Performed by: PHYSICAL MEDICINE & REHABILITATION

## 2021-12-09 PROCEDURE — 74011000250 HC RX REV CODE- 250: Performed by: PHYSICAL MEDICINE & REHABILITATION

## 2021-12-09 PROCEDURE — 76010000009 HC PAIN MGT 0 TO 30 MIN PROC: Performed by: PHYSICAL MEDICINE & REHABILITATION

## 2021-12-09 RX ORDER — DIAZEPAM 5 MG/1
5-20 TABLET ORAL ONCE
Status: COMPLETED | OUTPATIENT
Start: 2021-12-09 | End: 2021-12-09

## 2021-12-09 RX ORDER — LIDOCAINE HYDROCHLORIDE 10 MG/ML
INJECTION, SOLUTION EPIDURAL; INFILTRATION; INTRACAUDAL; PERINEURAL AS NEEDED
Status: DISCONTINUED | OUTPATIENT
Start: 2021-12-09 | End: 2021-12-09 | Stop reason: HOSPADM

## 2021-12-09 RX ORDER — DEXAMETHASONE SODIUM PHOSPHATE 100 MG/10ML
INJECTION INTRAMUSCULAR; INTRAVENOUS AS NEEDED
Status: DISCONTINUED | OUTPATIENT
Start: 2021-12-09 | End: 2021-12-09 | Stop reason: HOSPADM

## 2021-12-09 RX ADMIN — DIAZEPAM 10 MG: 5 TABLET ORAL at 14:07

## 2021-12-09 NOTE — DISCHARGE INSTRUCTIONS
[facility]   (MAKAYLA)  Dr. Gayla Astudillo, Dr. Gracia Villa, Dr. Amber Richey Spinal Procedure (Block) Instructions    * Do not drive a car, operate heavy machinery or dangerous equipment, or make important decisions for 12-24 hours. * Light activity as tolerated; may rest for the remainder of the day. * Resume pre-block medications including those from your other doctors. * Do not drink alcoholic beverages for 24 hours. Alcohol and the medications you have received may interact and cause an adverse reaction. * You may feel better this evening and worse tomorrow, as the numbing medications wears off and the steroid has yet to begin to work. After 48-72 hrs the steroid should begin to release bringing you relief. If you had a medial branch block, no steroids were used. The medial branch block is a test to see if you are a candidate for radiofrequency ablation (RFA). The anesthetic (numbing medicine)  will wear off by the next day. * You may shower this evening and remove any bandages. * Avoid hot tubs/pools/tub soaks and heating pads for 24 hours. You may use cold packs on the procedure site as tolerated for the first 24 hours. * If a headache develops, drink plenty of fluids and rest.  Take over the counter medications for headache if needed. If the headache continues longer than 24 hours, call MD at the [address] [place] [address] Avenue. [phone]    * Continue taking pain medications as needed. * You may resume your regular diet if tolerated. Otherwise, start with sips of water and advance slowly. * If Diabetic: check your blood sugar three times a day for the next 3 days. If your sugar is greater than 300 call your family doctor. If your sugar is greater than 400, have someone transport you to the nearest Emergency Room. * If you experience any of the following problems, Please Call the [address] [place] [address] Avenue at [phone].         * Excessive pain, swelling, redness or odor at or around the surgical area    * Fever of 101 or higher    * Nausea / Vomiting lasting longer than 4 hours or if unable to take medications. * Severe Headache    * Weakness or numbness in arms or legs that is not      resolving   * Any NEW signs of decreased circulation or nerve impairment in leg: change in color, swelling, persistent numbness, tingling                    * Prolonged increase in pain greater than 4 days      PATIENT INSTRUCTIONS:    After oral sedation, for 12-24 hours or while taking prescription Narcotics:  · Limit your activities  · Do not drive and operate hazardous machinery  · Do not make important personal or business decisions  · Do  not drink alcoholic beverages  · If you have not urinated within 8 hours after discharge, please contact your surgeon on call. *  Please give a list of your current medications to your Primary Care Provider. *  Please update this list whenever your medications are discontinued, doses are      changed, or new medications (including over-the-counter products) are added. *  Please carry medication information at all times in case of emergency situations. These are general instructions for a healthy lifestyle:    No smoking/ No tobacco products/ Avoid exposure to second hand smoke    Surgeon General's Warning:  Quitting smoking now greatly reduces serious risk to your health. Obesity, smoking, and sedentary lifestyle greatly increases your risk for illness    A healthy diet, regular physical exercise & weight monitoring are important for maintaining a healthy lifestyle    You may be retaining fluid if you have a history of heart failure or if you experience any of the following symptoms:  Weight gain of 3 pounds or more overnight or 5 pounds in a week, increased swelling in our hands or feet or shortness of breath while lying flat in bed.   Please call your doctor as soon as you notice any of these symptoms; do not wait until your next office visit. Recognize signs and symptoms of STROKE:    F-face looks uneven    A-arms unable to move or move unevenly    S-speech slurred or non-existent    T-time-call 911 as soon as signs and symptoms begin-DO NOT go       Back to bed or wait to see if you get better-TIME IS BRAIN.

## 2021-12-09 NOTE — PROCEDURES
Intralaminar Epidural Steroid Block Procedure Note      Patient Name: Jonathan Lima    Date of Procedure: December 9, 2021    Preoperative Diagnosis: DDD (degenerative disc disease), lumbar [M51.36]    Post Operative Diagnosis: DDD (degenerative disc disease), lumbar [M51.36]    Procedure: L4-L5 Epidural Block     PROCEDURE:  Lumbar Epidural Block    Consent:  Informed  Consent was obtained prior to the procedure. The patient was given the opportunity to ask questions regarding the procedure and its associated risks. In addition to the potential risks associated with the procedure itself, the patient was informed both verbally and in writing of potential side effects of the use glucocorticoids. The patient appeared to comprehend the informed consent and desired to have the procedure performed. The patient was placed in the prone position on the fluoroscopy table and the back prepped and draped in the usual sterile manner. The interlaminar space was identified using fluoroscopy and the skin anesthetized with 1% Lidocaine. A #18 Tuohy Epidural needle was advanced under fluoroscopic control from a paramedian approach to the  epidural space as noted above, using the loss of resistance to fluid technique. Then, 10 mg of preservative free Dexamethasone and 2 cc of Lidocaine was slowly injected. The patient tolerated the procedure well. The injection area was cleaned and band aids applied. No excessive bleeding was noted. Patient dressed and was discharged to home with instructions.

## 2021-12-09 NOTE — PERIOP NOTES
Patient tolerated procedure well. No complications noted. VSS. No redness, swelling, or bleeding from injection site. Dressing dry and intact. Armband removed and shredded. Patient wheeled to main entrance by RN  and discharged alive and well, in stable condition.

## 2021-12-10 ENCOUNTER — NURSE TRIAGE (OUTPATIENT)
Dept: OTHER | Facility: CLINIC | Age: 66
End: 2021-12-10

## 2021-12-10 NOTE — TELEPHONE ENCOUNTER
Received call from Grace at [facility] with Red Flag Complaint. Triage not indicated. Patient is currently at dialysis treatment. States feeling lightheaded,?'s about lab results. Spoke to [employer], Nurse at [facility]. She is with patient and will answer lab questions and evaluate patient for complaints and notify physician if needed. Patient agrees with plan. Attention Provider: Thank you for allowing me to participate in the care of your patient. The patient was connected to triage in response to information provided to the [facility]. Please do not respond through this encounter as the response is not directed to a shared pool. Reason for Disposition  Rogel Caller has already spoken with another triager or PCP AND has further questions AND triager able to answer questions.     Protocols used: NO CONTACT OR DUPLICATE CONTACT CALL-ADULT-

## 2021-12-14 DIAGNOSIS — J45.20 MILD INTERMITTENT ASTHMA WITHOUT COMPLICATION: ICD-10-CM

## 2021-12-14 RX ORDER — ALBUTEROL SULFATE 0.83 MG/ML
SOLUTION RESPIRATORY (INHALATION)
Qty: 90 ML | Refills: 3 | Status: SHIPPED | OUTPATIENT
Start: 2021-12-14 | End: 2021-12-23 | Stop reason: SDUPTHER

## 2021-12-14 NOTE — TELEPHONE ENCOUNTER
Requested Prescriptions     Pending Prescriptions Disp Refills    albuterol (PROVENTIL VENTOLIN) 2.5 mg /3 mL (0.083 %) nebu 90 mL 3     Sig: USE 1 VIAL VIA NEBULIZER THREE TIMES DAILY

## 2021-12-16 DIAGNOSIS — I10 HYPERTENSION, UNSPECIFIED TYPE: ICD-10-CM

## 2021-12-20 ENCOUNTER — TELEPHONE (OUTPATIENT)
Dept: FAMILY MEDICINE CLINIC | Age: 66
End: 2021-12-20

## 2021-12-20 RX ORDER — SUCRALFATE 1 G/1
TABLET ORAL
Qty: 360 TABLET | Refills: 0 | Status: SHIPPED | OUTPATIENT
Start: 2021-12-20 | End: 2022-05-19 | Stop reason: SDUPTHER

## 2021-12-20 RX ORDER — AMLODIPINE BESYLATE 10 MG/1
10 TABLET ORAL DAILY
Qty: 30 TABLET | Refills: 1 | Status: SHIPPED | OUTPATIENT
Start: 2021-12-20 | End: 2022-04-12

## 2021-12-20 NOTE — TELEPHONE ENCOUNTER
[address] [facility] Phone # [phone], calling again regarding form for  Equipment, supplies and the script for the Nebulizer machine and RX solution for the machine. FAX # [phone]. Stated faxed form for the request on 12/13/21. Stated she spoke with Genia Pineda and she was going to have Kali Naranjo fill out form since Dr. Jacques Acevedo was out. Still have not received the form nor the scripts for Nebulizer machine or RX solution for the machine.

## 2021-12-21 NOTE — PROGRESS NOTES
[facility]  [address], [place]   Phone: [phone]  Fax: [phone]        PROGRESS NOTE      HISTORY OF PRESENT ILLNESS:  The patient is a 61 y.o. female and was seen today for follow up of centralized low back pain. Previously seen for low back pain radiating into the LLE in a L5 distribution to the knee x 3 months without injury. States her pain is holding steady. Her pain is aggravated with walking. Positive shopping cart sign. Pt is accompanied by her daughter. Pt denies change in bowel or bladder habits. Pt denies fever, weight loss, or skin changes. Pt denies taking antibiotics. Pt admits to previous spinal injections x 2 years through Dr. Hadley Lemos secondary to low back pain. She reports improvement with the injection. Patient denies previous spinal surgery or physical therapy/chiropractic care. She denies treating with any medications at this time. Per Dr. Dayan Kat, the pt's max safe dose of Neurontin is 300 mg BID. Pt is taking Eliquis and is followed by, Dr. Charlene Dickens, cardiology, telephone number: [phone]. Pt is taking Prednisone 5 mg daily secondary to kidney transplant. Per Dr. Jessica Nascimento note, pt has a RLE DVT but per pt she has been cleared by Dr. Hero Shankar patient is RHD. PmHx of kidney transplant in 2002, obesity, A-fib, chronic anticoagulation, HTN, migraines. The pt states she is currently on dialysis, 2x/week and is followed by Dr. Dayan Kat. Pt states she has been recieving dialysis for about 2 years. Note from Zeeshan Cruz MD dated 9/28/2021 indicating patient was seen with c/o left leg pain. She had a DVT on the right. Followed by Dr. Molly Carmichael secondary to DVT. Pain 8/10.  Lumbar spine CT scan dated 4/23/2020. Not independently reviewed. Unfortunately, films are not found in the system. Per report, severe multifactorial canal stenosis at L5-S1.  Complete effacement of the left lateral recess at L5-S1 with impingement of the descending left S1 nerve root. Moderate canal stenosis is present at L4-5 with high grade effacement of both lateral recesses. Pt is scheduled for a L spine MRI on 11/2/2021. Lumbar spine MRI dated 11/2/2021 films independently reviewed. Per report, multilevel degenerative disc and degenerative facet disease. Areas of central canal stenosis are minimal but there is severe left lateral recess stenosis at L5-S1 with associated impingement upon the descending left S1 nerve root. Mild areas of foraminal encroachment are present with left lateral disc osteophyte complex at L5-S1 mildly displacing the exiting left L5 nerve root. At her last clinical appointment,  I offered blocks, pt wished to proceed, pending approval from Dr. Leslie Schmid secondary to taking Eliquis. I ordered an epidural at L4-5. I increased her Neurontin 100 mg TID to 200 mg TID. Patient was advised to call office if intolerant to higher dose. I advised the pt to request records from Dr. Lane Spotted as we have requested records from his office multiple times       The patient returns today pain free. She rates her pain 0/10, previously 8/10. She is tolerating the Neurontin 100 mg TID. Pt underwent an epidural at L4-5 on 12/9/2021 with good relief of low back and LLE pain. Pt denies change in bowel or bladder habits.  reviewed. There is no height or weight on file to calculate BMI.     PCP: Cynthia Triplett MD      Past Medical History:   Diagnosis Date    Anemia NEC     Arrhythmia     Asthma     Asthma     Burning with urination     frequent uti    Chronic kidney disease     dialysis    CMV (cytomegalovirus) antibody positive     Dialysis patient ([facility] [address]     M/W/F    Dyspepsia and other specified disorders of function of stomach     stomach ulcer    Essential hypertension     GERD (gastroesophageal reflux disease)     Hypercholesteremia     Hypertension     Migraine     Obesity     Renal cyst 2002    kidney transplant     Ulcer         Social History Socioeconomic History    Marital status:      Spouse name: Not on file    Number of children: Not on file    Years of education: Not on file    Highest education level: Not on file   Occupational History    Not on file   Tobacco Use    Smoking status: Never Smoker    Smokeless tobacco: Never Used   Vaping Use    Vaping Use: Never used   Substance and Sexual Activity    Alcohol use: No    Drug use: No    Sexual activity: Not Currently   Other Topics Concern    Not on file   Social History Narrative    Not on file     Social Determinants of Health     Financial Resource Strain:     Difficulty of Paying Living Expenses: Not on file   Food Insecurity:     Worried About Running Out of Food in the Last Year: Not on file    Ida of Food in the Last Year: Not on file   Transportation Needs:     Lack of Transportation (Medical): Not on file    Lack of Transportation (Non-Medical):  Not on file   Physical Activity:     Days of Exercise per Week: Not on file    Minutes of Exercise per Session: Not on file   Stress:     Feeling of Stress : Not on file   Social Connections:     Frequency of Communication with Friends and Family: Not on file    Frequency of Social Gatherings with Friends and Family: Not on file    Attends Islam Services: Not on file    Active Member of [address] [place] [address] [employer] or Organizations: Not on file    Attends Club or Organization Meetings: Not on file    Marital Status: Not on file   Intimate Partner Violence:     Fear of Current or Ex-Partner: Not on file    Emotionally Abused: Not on file    Physically Abused: Not on file    Sexually Abused: Not on file   Housing Stability:     Unable to Pay for Housing in the Last Year: Not on file    Number of [place] in the Last Year: Not on file    Unstable Housing in the Last Year: Not on file       Current Outpatient Medications   Medication Sig Dispense Refill    albuterol (PROVENTIL VENTOLIN) 2.5 mg /3 mL (0.083 %) nebu USE 1 VIAL VIA NEBULIZER THREE TIMES DAILY 90 mL 3    sucralfate (CARAFATE) 1 gram tablet TAKE 1 TABLET BY MOUTH FOUR TIMES DAILY 360 Tablet 0    amLODIPine (NORVASC) 10 mg tablet Take 1 Tablet by mouth daily. 30 Tablet 1    famotidine (PEPCID) 20 mg tablet TAKE 1 TABLET BY MOUTH TWICE DAILY 30 Tablet 1    meclizine (ANTIVERT) 25 mg tablet TAKE 1 TABLET BY MOUTH THREE TIMES DAILY FOR UP TO 10 DAYS AS NEEDED FOR DIZZINESS 21 Tablet 0    gabapentin (Neurontin) 100 mg capsule Take 2 tablets by mouth three times daily (Patient not taking: Reported on 12/9/2021) 180 Capsule 1    gabapentin (NEURONTIN) 100 mg capsule Take 1 Capsule by mouth three (3) times daily. Max Daily Amount: 300 mg. 90 Capsule 1    amiodarone (CORDARONE) 200 mg tablet TAKE 1 TABLET BY MOUTH EVERY DAY 90 Tablet 2    valGANciclovir (VALCYTE) 450 mg tablet TAKE 2 TABLETS BY MOUTH DAILY 90 Tablet 5    apixaban (Eliquis DVT-PE Treat 30D Start) 5 mg (74 tabs) starter pack Take 5 mg by mouth twice a day for 7 days Followed by 2.5 mg by mouth twice a day 1 Dose Pack 0    levothyroxine (SYNTHROID) 50 mcg tablet Take 1 Tablet by mouth daily. 90 Tablet 3    carvediloL (COREG) 6.25 mg tablet Take 1 Tablet by mouth two (2) times daily (with meals). 180 Tablet 2    dexlansoprazole (Dexilant) 60 mg CpDB capsule (delayed release) Take 1 Capsule by mouth daily. 30 Capsule 5    hyoscyamine SL (LEVSIN/SL) 0.125 mg SL tablet 1 Tablet by SubLINGual route every four (4) hours as needed (irritable bowel). 30 Tablet 5    losartan (COZAAR) 50 mg tablet Take 1 Tablet by mouth daily. 90 Tablet 3    calcitRIOL (ROCALTROL) 0.25 mcg capsule Take 1 Capsule by mouth as directed. Take on non dialysis days      doxercalciferol (HECTOROL IV) 14 mcg by IntraVENous route two (2) days a week.  lidocaine-prilocaine (EMLA) topical cream as directed.       Wixela Inhub 250-50 mcg/dose diskus inhaler INHALE 1 PUFF BY MOUTH EVERY 12 HOURS 60 Each 3    RenaPlex-D 800 mcg-12.5 mg -2,000 unit tab Take 1 Tablet by mouth daily.  sevelamer carbonate (RENVELA) 800 mg tab tab Take 800 mg by mouth three (3) times daily.  zolpidem (AMBIEN) 5 mg tablet Take 5 mg by mouth nightly as needed.  dicyclomine (BENTYL) 10 mg capsule TAKE 1 CAPSULE BY MOUTH FOUR TIMES DAILY 120 Cap 1    simvastatin (ZOCOR) 20 mg tablet TAKE 1 TABLET BY MOUTH DAILY AS DIRECTED. 90 Tab 1    sertraline (ZOLOFT) 50 mg tablet Take 1 Tab by mouth daily. 90 Tab 0    aluminum & magnesium hydroxide-simethicone (Maalox Maximum Strength) 400-400-40 mg/5 mL suspension Take 10 mL by mouth every six (6) hours as needed for Indigestion. 250 mL 0    ESTRACE 0.01 % (0.1 mg/gram) vaginal cream INSERT 2 GRAMS INTO VAGINA EVERY MONDAY AND THURSDAY 42.5 g 5    cranberry extract 425 mg cap 425 mg.  LORazepam (ATIVAN) 0.5 mg tablet Take 0.5 mg by mouth daily.  fluticasone (FLONASE) 50 mcg/actuation nasal spray 1 Spray.  EPINEPHrine (EPIPEN) 0.3 mg/0.3 mL injection 0.3 mg.      montelukast (SINGULAIR) 10 mg tablet Take 10 mg by mouth daily. (Patient not taking: Reported on 12/9/2021)         Allergies   Allergen Reactions    Aspirin Rash and Unknown (comments)    Bactrim [Sulfamethoxazole-Trimethoprim] Rash and Unknown (comments)    Bromfenac Rash and Unknown (comments)    Cefepime Other (comments)     Neurological reaction    Ceftriaxone Rash    Copper Rash    Ibuprofen Rash and Unknown (comments)    Ketorolac Tromethamine Rash and Unknown (comments)    Morphine Rash and Unknown (comments)    Relafen [Nabumetone] Rash and Unknown (comments)    Rifampin Rash and Unknown (comments)    Vancomycin Rash and Unknown (comments)     Pt reports causes itching, takes benadryl prior to use. Pt denies anaphylaxis. Requires benadryl with each vancomycin dose          PHYSICAL EXAMINATION    There were no vitals taken for this visit.     CONSTITUTIONAL: NAD, A&O x 3  SENSATION: Intact to light touch throughout  RANGE OF MOTION: The patient has full passive range of motion in all four extremities. MOTOR:  Straight Leg Raise: Negative, bilateral               Hip Flex Knee Ext Knee Flex Ankle DF GTE Ankle PF Tone   Right +4/5 +4/5 +4/5 +4/5 +4/5 +4/5 +4/5   Left +4/5 +4/5 +4/5 +4/5 +4/5 +4/5 +4/5       ASSESSMENT   Diagnoses and all orders for this visit:    1. Facet arthropathy    2. DDD (degenerative disc disease), lumbar    3. Spinal stenosis of lumbar region, unspecified whether neurogenic claudication present    4. Lumbar neuritis      IMPRESSION AND PLAN:  Patient returns to the office today pain free. Multiple treatment options were discussed. Patient wished to continue her current treatment. I provided her refills of Neurontin 200 mg TID. Patient is neurologically intact. I will see the patient back in 3 month's time or earlier if needed. Written by Keyanna Lopez, as dictated by Chaka Dubon MD  I examined the patient, reviewed and agree with the note.

## 2021-12-22 ENCOUNTER — OFFICE VISIT (OUTPATIENT)
Dept: ORTHOPEDIC SURGERY | Age: 66
End: 2021-12-22
Payer: MEDICARE

## 2021-12-22 VITALS
OXYGEN SATURATION: 97 % | TEMPERATURE: 97.4 F | BODY MASS INDEX: 32.32 KG/M2 | HEIGHT: 61 IN | WEIGHT: 171.2 LBS | HEART RATE: 82 BPM

## 2021-12-22 DIAGNOSIS — M51.36 DDD (DEGENERATIVE DISC DISEASE), LUMBAR: ICD-10-CM

## 2021-12-22 DIAGNOSIS — M47.819 FACET ARTHROPATHY: Primary | ICD-10-CM

## 2021-12-22 DIAGNOSIS — M54.16 LUMBAR NEURITIS: ICD-10-CM

## 2021-12-22 DIAGNOSIS — M48.061 SPINAL STENOSIS OF LUMBAR REGION, UNSPECIFIED WHETHER NEUROGENIC CLAUDICATION PRESENT: ICD-10-CM

## 2021-12-22 PROCEDURE — 99213 OFFICE O/P EST LOW 20 MIN: CPT | Performed by: PHYSICAL MEDICINE & REHABILITATION

## 2021-12-22 PROCEDURE — 3017F COLORECTAL CA SCREEN DOC REV: CPT | Performed by: PHYSICAL MEDICINE & REHABILITATION

## 2021-12-22 PROCEDURE — G9231 DOC ESRD DIA TRANS PREG: HCPCS | Performed by: PHYSICAL MEDICINE & REHABILITATION

## 2021-12-22 PROCEDURE — G8432 DEP SCR NOT DOC, RNG: HCPCS | Performed by: PHYSICAL MEDICINE & REHABILITATION

## 2021-12-22 PROCEDURE — G8417 CALC BMI ABV UP PARAM F/U: HCPCS | Performed by: PHYSICAL MEDICINE & REHABILITATION

## 2021-12-22 PROCEDURE — G9899 SCRN MAM PERF RSLTS DOC: HCPCS | Performed by: PHYSICAL MEDICINE & REHABILITATION

## 2021-12-22 PROCEDURE — G8427 DOCREV CUR MEDS BY ELIG CLIN: HCPCS | Performed by: PHYSICAL MEDICINE & REHABILITATION

## 2021-12-22 RX ORDER — GABAPENTIN 100 MG/1
CAPSULE ORAL
Qty: 540 CAPSULE | Refills: 0 | Status: ON HOLD | OUTPATIENT
Start: 2021-12-22 | End: 2022-08-29

## 2021-12-22 NOTE — LETTER
[id number]    Patient: Kenan Garcia   YOB: 1955   Date of Visit: 12/22/2021     Manny Sanchez MD  [address]  [address] [address]  [address] [facility] [address]    Dear Manny Sanchez MD,      Thank you for referring Ms. Tate Cyr to [address] for evaluation. My notes for this consultation are attached. If you have questions, please do not hesitate to call me. I look forward to following your patient along with you.       Sincerely,    Dorcas Teixeira MD

## 2021-12-23 DIAGNOSIS — J45.20 MILD INTERMITTENT ASTHMA WITHOUT COMPLICATION: ICD-10-CM

## 2021-12-23 RX ORDER — ALBUTEROL SULFATE 0.83 MG/ML
SOLUTION RESPIRATORY (INHALATION)
Qty: 90 ML | Refills: 3 | Status: SHIPPED | OUTPATIENT
Start: 2021-12-23

## 2021-12-23 NOTE — TELEPHONE ENCOUNTER
----- Message from Rj Schmidt sent at 12/23/2021  3:24 PM EST -----  Subject: Medication Problem    QUESTIONS  Name of Medication? albuterol (PROVENTIL VENTOLIN) 2.5 mg /3 mL (0.083 %)   nebu  Patient-reported dosage and instructions? 1 vile 3x daily  What question or problem do you have with the medication? Patient states   pharmacy has been sending a request for 3 weeks via fax. Patient is   requesting the breathing machine and albuterol. Preferred Pharmacy? [address] phone number (if available)? [phone]  Additional Information for Provider?   ---------------------------------------------------------------------------  --------------  [address]  What is the best way for the office to contact you? OK to leave message on   voicemail  Preferred Call Back Phone Number? [phone]  ---------------------------------------------------------------------------  --------------  SCRIPT ANSWERS  Relationship to Patient?  Self

## 2021-12-23 NOTE — TELEPHONE ENCOUNTER
----- Message from Charlene Min sent at 12/23/2021  3:24 PM EST -----  Subject: Medication Problem    QUESTIONS  Name of Medication? albuterol (PROVENTIL VENTOLIN) 2.5 mg /3 mL (0.083 %)   nebu  Patient-reported dosage and instructions? 1 vile 3x daily  What question or problem do you have with the medication? Patient states   pharmacy has been sending a request for 3 weeks via fax. Patient is   requesting the breathing machine and albuterol. Preferred Pharmacy? [address] phone number (if available)? [phone]  Additional Information for Provider?   ---------------------------------------------------------------------------  --------------  [address]  What is the best way for the office to contact you? OK to leave message on   voicemail  Preferred Call Back Phone Number? [phone]  ---------------------------------------------------------------------------  --------------  SCRIPT ANSWERS  Relationship to Patient?  Self

## 2021-12-24 ENCOUNTER — APPOINTMENT (OUTPATIENT)
Dept: GENERAL RADIOLOGY | Age: 66
DRG: 640 | End: 2021-12-24
Attending: EMERGENCY MEDICINE
Payer: MEDICARE

## 2021-12-24 ENCOUNTER — HOSPITAL ENCOUNTER (INPATIENT)
Age: 66
LOS: 1 days | Discharge: HOME | DRG: 640 | End: 2021-12-25
Attending: EMERGENCY MEDICINE | Admitting: INTERNAL MEDICINE
Payer: MEDICARE

## 2021-12-24 DIAGNOSIS — J45.31 MILD PERSISTENT ASTHMA WITH ACUTE EXACERBATION: ICD-10-CM

## 2021-12-24 DIAGNOSIS — E87.5 ACUTE HYPERKALEMIA: Primary | ICD-10-CM

## 2021-12-24 DIAGNOSIS — N18.6 ESRD NEEDING DIALYSIS (HCC): ICD-10-CM

## 2021-12-24 DIAGNOSIS — R07.89 ATYPICAL CHEST PAIN: ICD-10-CM

## 2021-12-24 DIAGNOSIS — Z99.2 ESRD NEEDING DIALYSIS (HCC): ICD-10-CM

## 2021-12-24 LAB
ALBUMIN SERPL-MCNC: 4.1 G/DL (ref 3.5–4.7)
ALBUMIN/GLOB SERPL: 1.5 {RATIO}
ALP SERPL-CCNC: 91 U/L (ref 38–126)
ALT SERPL-CCNC: 28 U/L (ref 3–52)
ANION GAP SERPL CALC-SCNC: 17 MMOL/L
AST SERPL W P-5'-P-CCNC: 30 U/L (ref 14–74)
BASOPHILS # BLD: 0 K/UL (ref 0–0.1)
BASOPHILS NFR BLD: 0 % (ref 0–2)
BILIRUB SERPL-MCNC: 0.5 MG/DL (ref 0.2–1)
BUN SERPL-MCNC: 96 MG/DL (ref 9–21)
BUN/CREAT SERPL: 9
CA-I BLD-MCNC: 8.1 MG/DL (ref 8.5–10.5)
CHLORIDE SERPL-SCNC: 100 MMOL/L (ref 94–111)
CK MB SERPL-MCNC: 3.8 NG/ML (ref 5–25)
CK SERPL-CCNC: 178 U/L (ref 26–140)
CO2 SERPL-SCNC: 19 MMOL/L (ref 21–33)
CREAT SERPL-MCNC: 10.5 MG/DL (ref 0.7–1.2)
DIFFERENTIAL METHOD BLD: ABNORMAL
EOSINOPHIL # BLD: 0 K/UL (ref 0–0.4)
EOSINOPHIL NFR BLD: 0 % (ref 0–5)
ERYTHROCYTE [DISTWIDTH] IN BLOOD BY AUTOMATED COUNT: 15.9 % (ref 11.6–14.5)
GLOBULIN SER CALC-MCNC: 2.7 G/DL
GLUCOSE BLD STRIP.AUTO-MCNC: 128 MG/DL (ref 70–110)
GLUCOSE SERPL-MCNC: 124 MG/DL (ref 70–110)
HCT VFR BLD AUTO: 28.4 % (ref 35–45)
HGB BLD-MCNC: 9.1 G/DL (ref 12–16)
IMM GRANULOCYTES # BLD AUTO: 0.1 K/UL (ref 0–0.04)
IMM GRANULOCYTES NFR BLD AUTO: 1 % (ref 0–0.5)
LYMPHOCYTES # BLD: 0.6 K/UL (ref 0.9–3.6)
LYMPHOCYTES NFR BLD: 11 % (ref 21–52)
MAGNESIUM SERPL-MCNC: 2.2 MG/DL (ref 1.7–2.8)
MCH RBC QN AUTO: 37 PG (ref 24–34)
MCHC RBC AUTO-ENTMCNC: 32 G/DL (ref 31–37)
MCV RBC AUTO: 115.4 FL (ref 78–100)
MONOCYTES # BLD: 0.1 K/UL (ref 0.05–1.2)
MONOCYTES NFR BLD: 1 % (ref 3–10)
NEUTS SEG # BLD: 5.1 K/UL (ref 1.8–8)
NEUTS SEG NFR BLD: 87 % (ref 40–73)
NRBC # BLD: 0 K/UL (ref 0–0.01)
NRBC BLD-RTO: 0 PER 100 WBC
PERFORMED BY, TECHID: ABNORMAL
PLATELET # BLD AUTO: 181 K/UL (ref 135–420)
PMV BLD AUTO: 10.1 FL (ref 9.2–11.8)
POTASSIUM SERPL-SCNC: 7.3 MMOL/L (ref 3.2–5.1)
PROT SERPL-MCNC: 6.8 G/DL (ref 6.1–8.4)
RBC # BLD AUTO: 2.46 M/UL (ref 4.2–5.3)
RBC MORPH BLD: ABNORMAL
RBC MORPH BLD: ABNORMAL
SODIUM SERPL-SCNC: 136 MMOL/L (ref 135–145)
TROPONIN I SERPL-MCNC: 0.03 NG/ML (ref 0.02–0.05)
TROPONIN I SERPL-MCNC: 0.03 NG/ML (ref 0.02–0.05)
WBC # BLD AUTO: 5.9 K/UL (ref 4.6–13.2)

## 2021-12-24 PROCEDURE — 74011000250 HC RX REV CODE- 250: Performed by: EMERGENCY MEDICINE

## 2021-12-24 PROCEDURE — 74011250636 HC RX REV CODE- 250/636: Performed by: EMERGENCY MEDICINE

## 2021-12-24 PROCEDURE — 74011636637 HC RX REV CODE- 636/637: Performed by: EMERGENCY MEDICINE

## 2021-12-24 PROCEDURE — 74011000258 HC RX REV CODE- 258: Performed by: EMERGENCY MEDICINE

## 2021-12-24 PROCEDURE — 93005 ELECTROCARDIOGRAM TRACING: CPT

## 2021-12-24 PROCEDURE — 94640 AIRWAY INHALATION TREATMENT: CPT

## 2021-12-24 PROCEDURE — 74011000250 HC RX REV CODE- 250: Performed by: INTERNAL MEDICINE

## 2021-12-24 PROCEDURE — 82550 ASSAY OF CK (CPK): CPT

## 2021-12-24 PROCEDURE — 82962 GLUCOSE BLOOD TEST: CPT

## 2021-12-24 PROCEDURE — 83735 ASSAY OF MAGNESIUM: CPT

## 2021-12-24 PROCEDURE — 74011250637 HC RX REV CODE- 250/637: Performed by: INTERNAL MEDICINE

## 2021-12-24 PROCEDURE — 96374 THER/PROPH/DIAG INJ IV PUSH: CPT

## 2021-12-24 PROCEDURE — 74011250636 HC RX REV CODE- 250/636: Performed by: INTERNAL MEDICINE

## 2021-12-24 PROCEDURE — 82553 CREATINE MB FRACTION: CPT

## 2021-12-24 PROCEDURE — 90935 HEMODIALYSIS ONE EVALUATION: CPT

## 2021-12-24 PROCEDURE — 84484 ASSAY OF TROPONIN QUANT: CPT

## 2021-12-24 PROCEDURE — 71045 X-RAY EXAM CHEST 1 VIEW: CPT

## 2021-12-24 PROCEDURE — 94761 N-INVAS EAR/PLS OXIMETRY MLT: CPT

## 2021-12-24 PROCEDURE — 85025 COMPLETE CBC W/AUTO DIFF WBC: CPT

## 2021-12-24 PROCEDURE — 80053 COMPREHEN METABOLIC PANEL: CPT

## 2021-12-24 PROCEDURE — 99284 EMERGENCY DEPT VISIT MOD MDM: CPT

## 2021-12-24 PROCEDURE — 65270000029 HC RM PRIVATE

## 2021-12-24 RX ORDER — VALGANCICLOVIR 450 MG/1
900 TABLET, FILM COATED ORAL DAILY
Status: DISCONTINUED | OUTPATIENT
Start: 2021-12-25 | End: 2021-12-25 | Stop reason: HOSPADM

## 2021-12-24 RX ORDER — CARVEDILOL 6.25 MG/1
6.25 TABLET ORAL 2 TIMES DAILY WITH MEALS
Status: DISCONTINUED | OUTPATIENT
Start: 2021-12-24 | End: 2021-12-25 | Stop reason: HOSPADM

## 2021-12-24 RX ORDER — SODIUM BICARBONATE 1 MEQ/ML
50 SYRINGE (ML) INTRAVENOUS
Status: COMPLETED | OUTPATIENT
Start: 2021-12-24 | End: 2021-12-24

## 2021-12-24 RX ORDER — AMLODIPINE BESYLATE 5 MG/1
10 TABLET ORAL DAILY
Status: DISCONTINUED | OUTPATIENT
Start: 2021-12-25 | End: 2021-12-25 | Stop reason: HOSPADM

## 2021-12-24 RX ORDER — IPRATROPIUM BROMIDE AND ALBUTEROL SULFATE 2.5; .5 MG/3ML; MG/3ML
3 SOLUTION RESPIRATORY (INHALATION)
Status: DISCONTINUED | OUTPATIENT
Start: 2021-12-24 | End: 2021-12-25 | Stop reason: HOSPADM

## 2021-12-24 RX ORDER — ALBUTEROL SULFATE 0.83 MG/ML
2.5 SOLUTION RESPIRATORY (INHALATION)
Status: COMPLETED | OUTPATIENT
Start: 2021-12-24 | End: 2021-12-24

## 2021-12-24 RX ORDER — SODIUM CHLORIDE 9 MG/ML
2000 INJECTION, SOLUTION INTRAVENOUS
Status: DISCONTINUED | OUTPATIENT
Start: 2021-12-24 | End: 2021-12-25 | Stop reason: HOSPADM

## 2021-12-24 RX ORDER — SERTRALINE HYDROCHLORIDE 50 MG/1
50 TABLET, FILM COATED ORAL DAILY
Status: DISCONTINUED | OUTPATIENT
Start: 2021-12-25 | End: 2021-12-24

## 2021-12-24 RX ORDER — DICYCLOMINE HYDROCHLORIDE 10 MG/1
10 CAPSULE ORAL 4 TIMES DAILY
Status: DISCONTINUED | OUTPATIENT
Start: 2021-12-24 | End: 2021-12-25 | Stop reason: HOSPADM

## 2021-12-24 RX ORDER — SUCRALFATE 1 G/1
1 TABLET ORAL
Status: DISCONTINUED | OUTPATIENT
Start: 2021-12-24 | End: 2021-12-25 | Stop reason: HOSPADM

## 2021-12-24 RX ORDER — FAMOTIDINE 20 MG/1
20 TABLET, FILM COATED ORAL 2 TIMES DAILY
Status: DISCONTINUED | OUTPATIENT
Start: 2021-12-24 | End: 2021-12-25 | Stop reason: HOSPADM

## 2021-12-24 RX ORDER — GABAPENTIN 100 MG/1
200 CAPSULE ORAL 3 TIMES DAILY
Status: DISCONTINUED | OUTPATIENT
Start: 2021-12-24 | End: 2021-12-25 | Stop reason: HOSPADM

## 2021-12-24 RX ORDER — ATORVASTATIN CALCIUM 10 MG/1
10 TABLET, FILM COATED ORAL DAILY
Status: DISCONTINUED | OUTPATIENT
Start: 2021-12-25 | End: 2021-12-25 | Stop reason: HOSPADM

## 2021-12-24 RX ORDER — LEVOTHYROXINE SODIUM 50 UG/1
50 TABLET ORAL DAILY
Status: DISCONTINUED | OUTPATIENT
Start: 2021-12-25 | End: 2021-12-25 | Stop reason: HOSPADM

## 2021-12-24 RX ORDER — MONTELUKAST SODIUM 10 MG/1
10 TABLET ORAL DAILY
Status: DISCONTINUED | OUTPATIENT
Start: 2021-12-25 | End: 2021-12-24

## 2021-12-24 RX ORDER — DEXTROSE 50 % IN WATER (D50W) INTRAVENOUS SYRINGE
25
Status: COMPLETED | OUTPATIENT
Start: 2021-12-24 | End: 2021-12-24

## 2021-12-24 RX ORDER — SODIUM CHLORIDE 9 MG/ML
25 INJECTION, SOLUTION INTRAVENOUS
Status: DISCONTINUED | OUTPATIENT
Start: 2021-12-24 | End: 2021-12-25 | Stop reason: HOSPADM

## 2021-12-24 RX ORDER — SEVELAMER CARBONATE 800 MG/1
800 TABLET, FILM COATED ORAL 3 TIMES DAILY
Status: DISCONTINUED | OUTPATIENT
Start: 2021-12-24 | End: 2021-12-25 | Stop reason: HOSPADM

## 2021-12-24 RX ORDER — IPRATROPIUM BROMIDE AND ALBUTEROL SULFATE 2.5; .5 MG/3ML; MG/3ML
3 SOLUTION RESPIRATORY (INHALATION)
Status: COMPLETED | OUTPATIENT
Start: 2021-12-24 | End: 2021-12-24

## 2021-12-24 RX ORDER — LOSARTAN POTASSIUM 25 MG/1
50 TABLET ORAL DAILY
Status: DISCONTINUED | OUTPATIENT
Start: 2021-12-25 | End: 2021-12-25 | Stop reason: HOSPADM

## 2021-12-24 RX ORDER — AMIODARONE HYDROCHLORIDE 200 MG/1
200 TABLET ORAL DAILY
Status: DISCONTINUED | OUTPATIENT
Start: 2021-12-25 | End: 2021-12-25 | Stop reason: HOSPADM

## 2021-12-24 RX ADMIN — DICYCLOMINE HYDROCHLORIDE 10 MG: 10 CAPSULE ORAL at 17:32

## 2021-12-24 RX ADMIN — SODIUM BICARBONATE 50 MEQ: 84 INJECTION, SOLUTION INTRAVENOUS at 07:54

## 2021-12-24 RX ADMIN — CALCIUM GLUCONATE 1 G: 98 INJECTION, SOLUTION INTRAVENOUS at 11:22

## 2021-12-24 RX ADMIN — IPRATROPIUM BROMIDE AND ALBUTEROL SULFATE 3 ML: .5; 2.5 SOLUTION RESPIRATORY (INHALATION) at 06:22

## 2021-12-24 RX ADMIN — INSULIN HUMAN 10 UNITS: 100 INJECTION, SOLUTION PARENTERAL at 07:54

## 2021-12-24 RX ADMIN — ALBUTEROL SULFATE 2.5 MG: 2.5 SOLUTION RESPIRATORY (INHALATION) at 07:47

## 2021-12-24 RX ADMIN — SODIUM CHLORIDE 2000 ML: 9 INJECTION, SOLUTION INTRAVENOUS at 15:29

## 2021-12-24 RX ADMIN — METHYLPREDNISOLONE SODIUM SUCCINATE 125 MG: 125 INJECTION, POWDER, FOR SOLUTION INTRAMUSCULAR; INTRAVENOUS at 06:17

## 2021-12-24 RX ADMIN — GABAPENTIN 200 MG: 100 CAPSULE ORAL at 21:48

## 2021-12-24 RX ADMIN — DICYCLOMINE HYDROCHLORIDE 10 MG: 10 CAPSULE ORAL at 21:49

## 2021-12-24 RX ADMIN — SUCRALFATE 1 G: 1 TABLET ORAL at 21:49

## 2021-12-24 RX ADMIN — DEXTROSE MONOHYDRATE 25 G: 500 INJECTION PARENTERAL at 07:54

## 2021-12-24 RX ADMIN — SEVELAMER CARBONATE 800 MG: 800 TABLET, FILM COATED ORAL at 21:49

## 2021-12-24 RX ADMIN — APIXABAN 2.5 MG: 2.5 TABLET, FILM COATED ORAL at 17:32

## 2021-12-24 RX ADMIN — IPRATROPIUM BROMIDE AND ALBUTEROL SULFATE 3 ML: .5; 2.5 SOLUTION RESPIRATORY (INHALATION) at 20:00

## 2021-12-24 RX ADMIN — FAMOTIDINE 20 MG: 20 TABLET ORAL at 17:32

## 2021-12-24 RX ADMIN — IPRATROPIUM BROMIDE AND ALBUTEROL SULFATE 3 ML: .5; 2.5 SOLUTION RESPIRATORY (INHALATION) at 12:29

## 2021-12-24 NOTE — H&P
Hospitalist Admission Note    NAME: Ambrose Casarez   :  1955   MRN:  [id number]     Date/Time:  2021 10:58 AM    Patient PCP: Kathrin Coyne MD  ______________________________________________________________________  Given the patient's current clinical presentation, I have a high level of concern for decompensation if discharged from the emergency department. Complex decision making was performed, which includes reviewing the patient's available past medical records, laboratory results, and x-ray films. My assessment of this patient's clinical condition and my plan of care is as follows. Assessment / Plan:    Hyperkalemia in setting esrd  - admit, tele  - needs HD today  - sp calcium, insulin in ed  - recheck after hd    ESRD  - neprhology consulted, cont vitamins    Asthma  - suspect current sob is more to overload, hd first, then re -eval need for steriods, ok for duonebs as needed    HTN  - cont home meds    gerd  - cont h2 blocker      Subjective:   CHIEF COMPLAINT: sob    HISTORY OF PRESENT ILLNESS:     Ambrose Casarez, 61 y.o. female with a past medical history significant Multiple medical problems, including end-stage renal disease and is dialyzed at [facility] twice a week,  and , is due for dialysis noon today, history of asthma, hypertension, hypercholesterolemia, GERD, arrhythmias, suspect A. fib is on Eliquis, anemia, obesity presents to the ED via EMS with cc of shortness of breath. Patient states that this began 2 days ago while she was putting up [employer] tree. She began sneezing and coughing with progressive shortness of breath. She states she  had to use her inhalers more than usual.  She  used inhaler a few more times this morning and also got on her nebulizer machine with little relief. She has a dry cough, she sneezes, and she is wheezing with shortness of breath.   She also describes heaviness in her chest since yesterday afternoon that is persistent. She usually gets this with her asthma though. We were asked to admit for work up and evaluation of the above problems.      Past Medical History:   Diagnosis Date    Anemia NEC     Arrhythmia     Asthma     Asthma     Burning with urination     frequent uti    Chronic kidney disease     dialysis    CMV (cytomegalovirus) antibody positive     Dialysis patient ([address]     M/W/F    Dyspepsia and other specified disorders of function of stomach     stomach ulcer    Essential hypertension     GERD (gastroesophageal reflux disease)     Hypercholesteremia     Hypertension     Migraine     Obesity     Renal cyst 2002    kidney transplant     Ulcer         Past Surgical History:   Procedure Laterality Date    COLONOSCOPY      Dr Felicia Keith  5yrs ago    ENDOSCOPY VISIT-OUTPATIENT      Dr Felicia Keith  5 yrs ago    ENDOSCOPY, COLON, DIAGNOSTIC      with Dr. Humberto Warren HX CHOLECYSTECTOMY  02/2021    HX GI      ulcer    HX HEENT      sinus surg    HX RENAL TRANSPLANT      HX TRANSPLANT      kidney transplant 2002    VASCULAR SURGERY PROCEDURE UNLIST      shunt in prep for dialysis       Social History     Tobacco Use    Smoking status: Never Smoker    Smokeless tobacco: Never Used   Substance Use Topics    Alcohol use: No        Family History   Problem Relation Age of Onset    Colon Polyps Brother     Cancer Mother     Diabetes Father      Allergies   Allergen Reactions    Aspirin Rash and Unknown (comments)    Bactrim [Sulfamethoxazole-Trimethoprim] Rash and Unknown (comments)    Bromfenac Rash and Unknown (comments)    Cefepime Other (comments)     Neurological reaction    Ceftriaxone Rash    Copper Rash    Ibuprofen Rash and Unknown (comments)    Ketorolac Tromethamine Rash and Unknown (comments)    Morphine Rash and Unknown (comments)    Relafen [Nabumetone] Rash and Unknown (comments)    Rifampin Rash and Unknown (comments)    Vancomycin Rash and Unknown (comments)     Pt reports causes itching, takes benadryl prior to use. Pt denies anaphylaxis. Requires benadryl with each vancomycin dose        Prior to Admission medications    Medication Sig Start Date End Date Taking? Authorizing Provider   albuterol (PROVENTIL VENTOLIN) 2.5 mg /3 mL (0.083 %) nebu USE 1 VIAL VIA NEBULIZER THREE TIMES DAILY 12/23/21  Yes Hany Rivera MD   gabapentin (NEURONTIN) 100 mg capsule 2 capsules TID 12/22/21  Yes Blair Victoria MD   sucralfate (CARAFATE) 1 gram tablet TAKE 1 TABLET BY MOUTH FOUR TIMES DAILY 12/20/21  Yes Hany Rivera MD   amLODIPine (NORVASC) 10 mg tablet Take 1 Tablet by mouth daily. 12/20/21  Yes Hany Rivera MD   famotidine (PEPCID) 20 mg tablet TAKE 1 TABLET BY MOUTH TWICE DAILY 11/30/21  Yes Hany Rivera MD   meclizine (ANTIVERT) 25 mg tablet TAKE 1 TABLET BY MOUTH THREE TIMES DAILY FOR UP TO 10 DAYS AS NEEDED FOR DIZZINESS 11/30/21  Yes Hany Rivera MD   gabapentin (Neurontin) 100 mg capsule Take 2 tablets by mouth three times daily 11/23/21  Yes Blair Victoria MD   amiodarone (CORDARONE) 200 mg tablet TAKE 1 TABLET BY MOUTH EVERY DAY 9/18/21  Yes Valere Landau, MD   valGANciclovir (VALCYTE) 450 mg tablet TAKE 2 TABLETS BY MOUTH DAILY 9/18/21  Yes Valere Landau, MD   apixaban (Eliquis DVT-PE Treat 30D Start) 5 mg (74 tabs) starter pack Take 5 mg by mouth twice a day for 7 days Followed by 2.5 mg by mouth twice a day 9/14/21  Yes Dragan San MD   levothyroxine (SYNTHROID) 50 mcg tablet Take 1 Tablet by mouth daily. 8/24/21  Yes Valere Landau, MD   carvediloL (COREG) 6.25 mg tablet Take 1 Tablet by mouth two (2) times daily (with meals). 8/24/21  Yes Valere Landau, MD   dexlansoprazole (Dexilant) 60 mg CpDB capsule (delayed release) Take 1 Capsule by mouth daily. 8/24/21  Yes Valere Landau, MD   hyoscyamine SL (LEVSIN/SL) 0.125 mg SL tablet 1 Tablet by SubLINGual route every four (4) hours as needed (irritable bowel).  8/24/21  Yes Leonora Andino MD LAURA   losartan (COZAAR) 50 mg tablet Take 1 Tablet by mouth daily. 8/24/21  Yes Loly Dumont MD   calcitRIOL (ROCALTROL) 0.25 mcg capsule Take 1 Capsule by mouth as directed. Take on non dialysis days 7/19/21  Yes Marleni, MD Abelardo   doxercalciferol (HECTOROL IV) 14 mcg by IntraVENous route two (2) days a week. 3/12/21 3/11/22 Yes Marleni, MD Abelardo   lidocaine-prilocaine (EMLA) topical cream as directed. 7/23/21  Yes Other, MD Ant Zhou Qualia 250-50 mcg/dose diskus inhaler INHALE 1 PUFF BY MOUTH EVERY 12 HOURS 7/3/21  Yes Loly Dumont MD   RenaPlex-D 800 mcg-12.5 mg -2,000 unit tab Take 1 Tablet by mouth daily. 4/15/21  Yes Abelardo Yepez MD   sevelamer carbonate (RENVELA) 800 mg tab tab Take 800 mg by mouth three (3) times daily. 6/8/21  Yes Abelardo Yepez MD   zolpidem (AMBIEN) 5 mg tablet Take 5 mg by mouth nightly as needed. 4/5/21  Yes Marleni, MD Abelardo   dicyclomine (BENTYL) 10 mg capsule TAKE 1 CAPSULE BY MOUTH FOUR TIMES DAILY 4/15/21  Yes Loly Dumont MD   simvastatin (ZOCOR) 20 mg tablet TAKE 1 TABLET BY MOUTH DAILY AS DIRECTED. 2/19/21  Yes Loly Dumont MD   sertraline (ZOLOFT) 50 mg tablet Take 1 Tab by mouth daily. 2/10/21  Yes Loly Dumont MD   aluminum & magnesium hydroxide-simethicone (Maalox Maximum Strength) 400-400-40 mg/5 mL suspension Take 10 mL by mouth every six (6) hours as needed for Indigestion. 9/17/20  Yes Kita Salazar MD   ESTRACE 0.01 % (0.1 mg/gram) vaginal cream INSERT 2 GRAMS INTO VAGINA EVERY MONDAY AND THURSDAY 4/11/17  Yes Lon Harvey MD   cranberry extract 425 mg cap 425 mg. 1/17/14  Yes Provider, Historical   LORazepam (ATIVAN) 0.5 mg tablet Take 0.5 mg by mouth daily. Yes Provider, Historical   fluticasone (FLONASE) 50 mcg/actuation nasal spray 1 Spray. Yes Provider, Historical   EPINEPHrine (EPIPEN) 0.3 mg/0.3 mL injection 0.3 mg. 10/1/16  Yes Provider, Historical   montelukast (SINGULAIR) 10 mg tablet Take 10 mg by mouth daily.      Yes Provider, Historical       REVIEW OF SYSTEMS:     I am not able to complete the review of systems because: The patient is intubated and sedated    The patient has altered mental status due to his acute medical problems    The patient has baseline aphasia from prior stroke(s)    The patient has baseline dementia and is not reliable historian    The patient is in acute medical distress and unable to provide information           Total of 12 systems reviewed as follows:       POSITIVE= underlined text  Negative = text not underlined  General:  fever, chills, sweats, generalized weakness, weight loss/gain,      loss of appetite   Eyes:    blurred vision, eye pain, loss of vision, double vision  ENT:    rhinorrhea, pharyngitis   Respiratory:   cough,  wheezing,   Cardiology:   chest pain,   Gastrointestinal:  abdominal pain , N/V, diarrhea, dysphagia, constipation, bleeding   Genitourinary:  frequency, urgency, dysuria, hematuria, incontinence   Muskuloskeletal :  arthralgia, myalgia, back pain  Hematology:  easy bruising, nose or gum bleeding, lymphadenopathy   Dermatological: rash, ulceration, pruritis, color change / jaundice  Endocrine:   hot flashes or polydipsia   Neurological:  headache, dizziness, confusion, focal weakness, paresthesia,     Speech difficulties, memory loss, gait difficulty  Psychological: Feelings of anxiety, depression, agitation    Objective:   VITALS:    Visit Vitals  BP (!) 143/67   Pulse 72   Temp 98.3 °F (36.8 °C)   Resp 24   Ht 5' 1\" (1.549 m)   Wt 77.6 kg (171 lb)   SpO2 99%   BMI 32.31 kg/m²       PHYSICAL EXAM:    General:    Alert, cooperative, no distress, appears stated age. HEENT: Atraumatic, anicteric sclerae, pink conjunctivae     No oral ulcers, mucosa moist, throat clear, dentition fair  Neck:  Supple, symmetrical,  thyroid: non tender  Lungs:   End exp wheeze  Chest wall:  No tenderness  No Accessory muscle use.   Heart:   Regular  rhythm,  No  murmur   No edema  Abdomen: Soft, non-tender. Not distended. Bowel sounds normal  Extremities: No cyanosis. No clubbing,      Skin turgor normal, Capillary refill normal, Radial dial pulse 2+  Skin:     Not pale. Not Jaundiced  No rashes   Psych:  Good insight. Not depressed. Not anxious or agitated. Neurologic: EOMs intact. No facial asymmetry. No aphasia or slurred speech. Symmetrical strength, Sensation grossly intact. Alert and oriented X 4.     _______________________________________________________________________  Care Plan discussed with:    Comments   Patient x    Family      RN     Care Manager                    Consultant:      _______________________________________________________________________  Expected  Disposition:   Home with Family x   HH/PT/OT/RN    SNF/LTC    [facility]    ________________________________________________________________________  TOTAL TIME:  28 Minutes    Critical Care Provided     Minutes non procedure based      Comments     Reviewed previous records   >50% of visit spent in counseling and coordination of care  Discussion with patient and/or family and questions answered       ________________________________________________________________________  Signed: Misha Miller MD    Procedures: see electronic medical records for all procedures/Xrays and details which were not copied into this note but were reviewed prior to creation of Plan.     LAB DATA REVIEWED:    Recent Results (from the past 24 hour(s))   EKG, 12 LEAD, INITIAL    Collection Time: 12/24/21  5:38 AM   Result Value Ref Range    Ventricular Rate 67 BPM    Atrial Rate 67 BPM    P-R Interval 241 ms    QRS Duration 109 ms    Q-T Interval 447 ms    QTC Calculation (Bezet) 472 ms    Calculated P Axis 95 degrees    Calculated R Axis 83 degrees    Calculated T Axis 50 degrees    Diagnosis       Sinus rhythm  Prolonged TX interval  Borderline right axis deviation     CBC WITH AUTOMATED DIFF    Collection Time: 12/24/21  5:45 AM   Result Value Ref Range    WBC 5.9 4.6 - 13.2 K/uL    RBC 2.46 (L) 4.20 - 5.30 M/uL    HGB 9.1 (L) 12.0 - 16.0 g/dL    HCT 28.4 (L) 35.0 - 45.0 %    .4 (H) 78.0 - 100.0 FL    MCH 37.0 (H) 24.0 - 34.0 PG    MCHC 32.0 31.0 - 37.0 g/dL    RDW 15.9 (H) 11.6 - 14.5 %    PLATELET 923 327 - 209 K/uL    MPV 10.1 9.2 - 11.8 FL    NRBC 0.0 0.0  WBC    ABSOLUTE NRBC 0.00 0.00 - 0.01 K/uL    NEUTROPHILS 87 (H) 40 - 73 %    LYMPHOCYTES 11 (L) 21 - 52 %    MONOCYTES 1 (L) 3 - 10 %    EOSINOPHILS 0 0 - 5 %    BASOPHILS 0 0 - 2 %    IMMATURE GRANULOCYTES 1 (H) 0 - 0.5 %    ABS. NEUTROPHILS 5.1 1.8 - 8.0 K/UL    ABS. LYMPHOCYTES 0.6 (L) 0.9 - 3.6 K/UL    ABS. MONOCYTES 0.1 0.05 - 1.2 K/UL    ABS. EOSINOPHILS 0.0 0.0 - 0.4 K/UL    ABS. BASOPHILS 0.0 0.0 - 0.1 K/UL    ABS. IMM. GRANS. 0.1 (H) 0.00 - 0.04 K/UL    DF Manual      RBC COMMENTS Macrocytosis  1+        RBC COMMENTS Anisocytosis  1+       METABOLIC PANEL, COMPREHENSIVE    Collection Time: 12/24/21  5:45 AM   Result Value Ref Range    Sodium 136 135 - 145 mmol/L    Potassium 7.3 (HH) 3.2 - 5.1 mmol/L    Chloride 100 94 - 111 mmol/L    CO2 19 (L) 21 - 33 mmol/L    Anion gap 17 mmol/L    Glucose 124 (H) 70 - 110 mg/dL    BUN 96 (H) 9 - 21 mg/dL    Creatinine 10.50 (H) 0.70 - 1.20 mg/dL    BUN/Creatinine ratio 9      GFR est AA 4 ml/min/1.73m2    GFR est non-AA 4 ml/min/1.73m2    Calcium 8.1 (L) 8.5 - 10.5 mg/dL    Bilirubin, total 0.5 0.2 - 1.0 mg/dL    AST (SGOT) 30 14 - 74 U/L    ALT (SGPT) 28 3 - 52 U/L    Alk. phosphatase 91 38 - 126 U/L    Protein, total 6.8 6.1 - 8.4 g/dL    Albumin 4.1 3.5 - 4.7 g/dL    Globulin 2.7 g/dL    A-G Ratio 1.5     TROPONIN I    Collection Time: 12/24/21  5:45 AM   Result Value Ref Range    Troponin-I, Qt. 0.03 0.02 - 0.05 ng/mL   CK    Collection Time: 12/24/21  5:45 AM   Result Value Ref Range     (H) 26 - 140 U/L   CK-MB,QUANT.     Collection Time: 12/24/21  5:45 AM   Result Value Ref Range    CK - MB 3.8 ng/mL   MAGNESIUM    Collection Time: 12/24/21  5:45 AM   Result Value Ref Range    Magnesium 2.2 1.7 - 2.8 mg/dL   TROPONIN I    Collection Time: 12/24/21  8:30 AM   Result Value Ref Range    Troponin-I, Qt. 0.03 0.02 - 0.05 ng/mL   GLUCOSE, POC    Collection Time: 12/24/21  8:38 AM   Result Value Ref Range    Glucose (POC) 128 (H) 70 - 110 mg/dL    Performed by Stacey Mcintyre

## 2021-12-24 NOTE — ED NOTES
Verbal shift change report given to Canelo Zhang  (oncoming nurse) by Rae Montanez RN (offgoing nurse). Report included the following information SBAR, ED Summary and MAR.

## 2021-12-24 NOTE — ED TRIAGE NOTES
Patient arrived via EMS complaining of shortness of breath for two days. Patient states she has had to use her inhaler more than usual. Patient also states she is supposed to have dialysis today at noon. Patient normally gets dialysis on Mondays and Fridays.

## 2021-12-24 NOTE — Clinical Note
Status[de-identified] INPATIENT [101]   Type of Bed: Intensive Care [6]   Cardiac Monitoring Required?: Yes   Inpatient Hospitalization Certified Necessary for the Following Reasons: 4.  Patient requires ICU level of care interventions (further clarification in H&P documentation)   Admitting Diagnosis: Hyperkalemia [215730]   Admitting Diagnosis: Mild persistent asthma with (acute) exacerbation [5533032]   Admitting Diagnosis: ESRD needing dialysis [facility] [[id number]]   Admitting Diagnosis: Atypical chest pain [578366]   Admitting Physician: Ankita Trejo [[id number]]   Attending Physician: Ankita Trejo [[id number]]   Estimated Length of Stay: 2 Midnights   Discharge Plan[de-identified] Home with Office Follow-up

## 2021-12-24 NOTE — PROGRESS NOTES
7035- Arrived to room 246 alert and oriented, vss, running sinus rhythm with first degree block on the telemetry, ambulated to hospital bed from ER stretcher. 1251- Dr. Pepe Lund in to evaluate patient.     1220- respiratory therapist called to evaluate patient, wheezing and SOB at this time and requesting breathing treatment

## 2021-12-24 NOTE — ED PROVIDER NOTES
EMERGENCY DEPARTMENT HISTORY AND PHYSICAL EXAM      Date: 12/24/2021  Patient Name: Apurva Mckeon      History of Presenting Illness     Chief Complaint   Patient presents with    Shortness of Breath       History Provided By: Patient and EMS    HPI: Apurva Mckeon, 61 y.o. female with a past medical history significant Multiple medical problems, including end-stage renal disease and is dialyzed at [facility] twice a week, Mondays and Fridays, is due for dialysis noon today, history of asthma, hypertension, hypercholesterolemia, GERD, arrhythmias, suspect A. fib is on Eliquis, anemia, obesity presents to the ED via EMS with cc of shortness of breath. Patient states that this began 2 days ago while she was putting up [employer] tree. She began sneezing and coughing with progressive shortness of breath. She states she  had to use her inhalers more than usual.  She  used inhaler a few more times this morning and also got on her nebulizer machine with little relief. She has a dry cough, she sneezes, and she is wheezing with shortness of breath. She also describes heaviness in her chest since yesterday afternoon that is persistent. She usually gets this with her asthma though. There are no other complaints, changes, or physical findings at this time.     PCP: Johnathan Spear MD    Current Facility-Administered Medications   Medication Dose Route Frequency Provider Last Rate Last Admin    calcium gluconate 1 g in 0.9% sodium chloride 100 mL IVPB  1 g IntraVENous ONCE Mac Pete MD        dextrose (D50W) injection syrg 25 g  25 g IntraVENous NOW Mac Pete MD        sodium bicarbonate 8.4 % (1 mEq/mL) injection 50 mEq  50 mEq IntraVENous NOW Mac Pete MD        insulin regular (NOVOLIN R, HUMULIN R) injection 10 Units  10 Units IntraVENous NOW Mac Pete MD        albuterol (PROVENTIL VENTOLIN) nebulizer solution 2.5 mg  2.5 mg Nebulization NOW Mac Pete MD YURIY         Current Outpatient Medications   Medication Sig Dispense Refill    albuterol (PROVENTIL VENTOLIN) 2.5 mg /3 mL (0.083 %) nebu USE 1 VIAL VIA NEBULIZER THREE TIMES DAILY 90 mL 3    gabapentin (NEURONTIN) 100 mg capsule 2 capsules  Capsule 0    sucralfate (CARAFATE) 1 gram tablet TAKE 1 TABLET BY MOUTH FOUR TIMES DAILY 360 Tablet 0    amLODIPine (NORVASC) 10 mg tablet Take 1 Tablet by mouth daily. 30 Tablet 1    famotidine (PEPCID) 20 mg tablet TAKE 1 TABLET BY MOUTH TWICE DAILY 30 Tablet 1    meclizine (ANTIVERT) 25 mg tablet TAKE 1 TABLET BY MOUTH THREE TIMES DAILY FOR UP TO 10 DAYS AS NEEDED FOR DIZZINESS 21 Tablet 0    gabapentin (Neurontin) 100 mg capsule Take 2 tablets by mouth three times daily 180 Capsule 1    amiodarone (CORDARONE) 200 mg tablet TAKE 1 TABLET BY MOUTH EVERY DAY 90 Tablet 2    valGANciclovir (VALCYTE) 450 mg tablet TAKE 2 TABLETS BY MOUTH DAILY 90 Tablet 5    apixaban (Eliquis DVT-PE Treat 30D Start) 5 mg (74 tabs) starter pack Take 5 mg by mouth twice a day for 7 days Followed by 2.5 mg by mouth twice a day 1 Dose Pack 0    levothyroxine (SYNTHROID) 50 mcg tablet Take 1 Tablet by mouth daily. 90 Tablet 3    carvediloL (COREG) 6.25 mg tablet Take 1 Tablet by mouth two (2) times daily (with meals). 180 Tablet 2    dexlansoprazole (Dexilant) 60 mg CpDB capsule (delayed release) Take 1 Capsule by mouth daily. 30 Capsule 5    hyoscyamine SL (LEVSIN/SL) 0.125 mg SL tablet 1 Tablet by SubLINGual route every four (4) hours as needed (irritable bowel). 30 Tablet 5    losartan (COZAAR) 50 mg tablet Take 1 Tablet by mouth daily. 90 Tablet 3    calcitRIOL (ROCALTROL) 0.25 mcg capsule Take 1 Capsule by mouth as directed. Take on non dialysis days      doxercalciferol (HECTOROL IV) 14 mcg by IntraVENous route two (2) days a week.  lidocaine-prilocaine (EMLA) topical cream as directed.       Wixela Inhub 250-50 mcg/dose diskus inhaler INHALE 1 PUFF BY MOUTH EVERY 12 HOURS 60 Each 3    RenaPlex-D 800 mcg-12.5 mg -2,000 unit tab Take 1 Tablet by mouth daily.  sevelamer carbonate (RENVELA) 800 mg tab tab Take 800 mg by mouth three (3) times daily.  zolpidem (AMBIEN) 5 mg tablet Take 5 mg by mouth nightly as needed.  dicyclomine (BENTYL) 10 mg capsule TAKE 1 CAPSULE BY MOUTH FOUR TIMES DAILY 120 Cap 1    simvastatin (ZOCOR) 20 mg tablet TAKE 1 TABLET BY MOUTH DAILY AS DIRECTED. 90 Tab 1    sertraline (ZOLOFT) 50 mg tablet Take 1 Tab by mouth daily. 90 Tab 0    aluminum & magnesium hydroxide-simethicone (Maalox Maximum Strength) 400-400-40 mg/5 mL suspension Take 10 mL by mouth every six (6) hours as needed for Indigestion. 250 mL 0    ESTRACE 0.01 % (0.1 mg/gram) vaginal cream INSERT 2 GRAMS INTO VAGINA EVERY MONDAY AND THURSDAY 42.5 g 5    cranberry extract 425 mg cap 425 mg.  LORazepam (ATIVAN) 0.5 mg tablet Take 0.5 mg by mouth daily.  fluticasone (FLONASE) 50 mcg/actuation nasal spray 1 Spray.  EPINEPHrine (EPIPEN) 0.3 mg/0.3 mL injection 0.3 mg.      montelukast (SINGULAIR) 10 mg tablet Take 10 mg by mouth daily.            Past History     Past Medical History:  Past Medical History:   Diagnosis Date    Anemia NEC     Arrhythmia     Asthma     Asthma     Burning with urination     frequent uti    Chronic kidney disease     dialysis    CMV (cytomegalovirus) antibody positive     Dialysis patient ([facility] [address]     M/W/F    Dyspepsia and other specified disorders of function of stomach     stomach ulcer    Essential hypertension     GERD (gastroesophageal reflux disease)     Hypercholesteremia     Hypertension     Migraine     Obesity     Renal cyst 2002    kidney transplant     Ulcer        Past Surgical History:  Past Surgical History:   Procedure Laterality Date    COLONOSCOPY      Dr Blaine Lugo  5yrs ago    ENDOSCOPY VISIT-OUTPATIENT      Dr Blaine Lugo  5 yrs ago    ENDOSCOPY, COLON, DIAGNOSTIC      with Dr. Brayan Blake CHOLECYSTECTOMY  02/2021    HX GI      ulcer    HX HEENT      sinus surg    HX RENAL TRANSPLANT      HX TRANSPLANT      kidney transplant 2002    VASCULAR SURGERY PROCEDURE UNLIST      shunt in prep for dialysis       Family History:  Family History   Problem Relation Age of Onset    Colon Polyps Brother     Cancer Mother     Diabetes Father        Social History:  Social History     Tobacco Use    Smoking status: Never Smoker    Smokeless tobacco: Never Used   Vaping Use    Vaping Use: Never used   Substance Use Topics    Alcohol use: No    Drug use: No       Allergies: Allergies   Allergen Reactions    Aspirin Rash and Unknown (comments)    Bactrim [Sulfamethoxazole-Trimethoprim] Rash and Unknown (comments)    Bromfenac Rash and Unknown (comments)    Cefepime Other (comments)     Neurological reaction    Ceftriaxone Rash    Copper Rash    Ibuprofen Rash and Unknown (comments)    Ketorolac Tromethamine Rash and Unknown (comments)    Morphine Rash and Unknown (comments)    Relafen [Nabumetone] Rash and Unknown (comments)    Rifampin Rash and Unknown (comments)    Vancomycin Rash and Unknown (comments)     Pt reports causes itching, takes benadryl prior to use. Pt denies anaphylaxis. Requires benadryl with each vancomycin dose         Review of Systems     Review of Systems   Constitutional: Positive for activity change and fatigue. Negative for chills and fever. HENT: Positive for congestion. Negative for sore throat. Respiratory: Positive for cough, chest tightness, shortness of breath and wheezing. Cardiovascular: Negative for chest pain. Gastrointestinal: Negative for abdominal distention, nausea and vomiting. Genitourinary: Negative for difficulty urinating, dysuria, flank pain, frequency, hematuria, urgency, vaginal bleeding and vaginal discharge. Musculoskeletal: Negative for arthralgias and joint swelling. Skin: Negative for rash and wound.    Neurological: Negative for dizziness, weakness, light-headedness and headaches. Hematological: Negative for adenopathy. Physical Exam     Physical Exam  Vitals and nursing note reviewed. Constitutional:       General: She is not in acute distress. Appearance: She is well-developed. She is not diaphoretic. Comments: Elderly female in no acute distress. Pulse ox is 100% on room air. HENT:      Head: Normocephalic and atraumatic. Jaw: No trismus. Right Ear: External ear normal. No swelling or tenderness. Tympanic membrane is not perforated, erythematous or bulging. Left Ear: External ear normal. No swelling or tenderness. Tympanic membrane is not perforated, erythematous or bulging. Nose: Nose normal. No mucosal edema or rhinorrhea. Right Sinus: No maxillary sinus tenderness or frontal sinus tenderness. Left Sinus: No maxillary sinus tenderness or frontal sinus tenderness. Mouth/Throat:      Mouth: No oral lesions. Dentition: No dental abscesses. Pharynx: Uvula midline. No oropharyngeal exudate, posterior oropharyngeal erythema or uvula swelling. Tonsils: No tonsillar abscesses. Eyes:      General: No scleral icterus. Right eye: No discharge. Left eye: No discharge. Conjunctiva/sclera: Conjunctivae normal.   Cardiovascular:      Rate and Rhythm: Normal rate and regular rhythm. Heart sounds: Normal heart sounds. No murmur heard. No friction rub. No gallop. Pulmonary:      Effort: Pulmonary effort is normal. No tachypnea, accessory muscle usage or respiratory distress. Breath sounds: Examination of the right-upper field reveals wheezing. Examination of the left-upper field reveals wheezing. Examination of the right-middle field reveals wheezing. Examination of the left-middle field reveals wheezing. Examination of the right-lower field reveals wheezing. Examination of the left-lower field reveals wheezing. Wheezing present.  No decreased breath sounds, rhonchi or rales. Comments: She is not tachypneic, she however has diffuse expiratory wheezes. No retractions. Abdominal:      Palpations: Abdomen is soft. Musculoskeletal:         General: No tenderness. Normal range of motion. Cervical back: Normal range of motion and neck supple. Right lower leg: No tenderness. Left lower leg: No tenderness. Lymphadenopathy:      Cervical: No cervical adenopathy. Skin:     General: Skin is warm and dry. Findings: No erythema or rash. Neurological:      Mental Status: She is alert and oriented to person, place, and time. Psychiatric:         Judgment: Judgment normal.         Lab and Diagnostic Study Results     Labs -     Recent Results (from the past 12 hour(s))   EKG, 12 LEAD, INITIAL    Collection Time: 12/24/21  5:38 AM   Result Value Ref Range    Ventricular Rate 67 BPM    Atrial Rate 67 BPM    P-R Interval 241 ms    QRS Duration 109 ms    Q-T Interval 447 ms    QTC Calculation (Bezet) 472 ms    Calculated P Axis 95 degrees    Calculated R Axis 83 degrees    Calculated T Axis 50 degrees    Diagnosis       Sinus rhythm  Prolonged SC interval  Borderline right axis deviation     CBC WITH AUTOMATED DIFF    Collection Time: 12/24/21  5:45 AM   Result Value Ref Range    WBC 5.9 4.6 - 13.2 K/uL    RBC 2.46 (L) 4.20 - 5.30 M/uL    HGB 9.1 (L) 12.0 - 16.0 g/dL    HCT 28.4 (L) 35.0 - 45.0 %    .4 (H) 78.0 - 100.0 FL    MCH 37.0 (H) 24.0 - 34.0 PG    MCHC 32.0 31.0 - 37.0 g/dL    RDW 15.9 (H) 11.6 - 14.5 %    PLATELET 836 791 - 923 K/uL    MPV 10.1 9.2 - 11.8 FL    NRBC 0.0 0.0  WBC    ABSOLUTE NRBC 0.00 0.00 - 0.01 K/uL    NEUTROPHILS 87 (H) 40 - 73 %    LYMPHOCYTES 11 (L) 21 - 52 %    MONOCYTES 1 (L) 3 - 10 %    EOSINOPHILS 0 0 - 5 %    BASOPHILS 0 0 - 2 %    IMMATURE GRANULOCYTES 1 (H) 0 - 0.5 %    ABS. NEUTROPHILS 5.1 1.8 - 8.0 K/UL    ABS. LYMPHOCYTES 0.6 (L) 0.9 - 3.6 K/UL    ABS.  MONOCYTES 0.1 0.05 - 1.2 K/UL ABS. EOSINOPHILS 0.0 0.0 - 0.4 K/UL    ABS. BASOPHILS 0.0 0.0 - 0.1 K/UL    ABS. IMM. GRANS. 0.1 (H) 0.00 - 0.04 K/UL    DF Manual      RBC COMMENTS Macrocytosis  1+        RBC COMMENTS Anisocytosis  1+       METABOLIC PANEL, COMPREHENSIVE    Collection Time: 12/24/21  5:45 AM   Result Value Ref Range    Sodium 136 135 - 145 mmol/L    Potassium 7.3 (HH) 3.2 - 5.1 mmol/L    Chloride 100 94 - 111 mmol/L    CO2 19 (L) 21 - 33 mmol/L    Anion gap 17 mmol/L    Glucose 124 (H) 70 - 110 mg/dL    BUN 96 (H) 9 - 21 mg/dL    Creatinine 10.50 (H) 0.70 - 1.20 mg/dL    BUN/Creatinine ratio 9      GFR est AA 4 ml/min/1.73m2    GFR est non-AA 4 ml/min/1.73m2    Calcium 8.1 (L) 8.5 - 10.5 mg/dL    Bilirubin, total 0.5 0.2 - 1.0 mg/dL    AST (SGOT) 30 14 - 74 U/L    ALT (SGPT) 28 3 - 52 U/L    Alk. phosphatase 91 38 - 126 U/L    Protein, total 6.8 6.1 - 8.4 g/dL    Albumin 4.1 3.5 - 4.7 g/dL    Globulin 2.7 g/dL    A-G Ratio 1.5     TROPONIN I    Collection Time: 12/24/21  5:45 AM   Result Value Ref Range    Troponin-I, Qt. 0.03 0.02 - 0.05 ng/mL   CK    Collection Time: 12/24/21  5:45 AM   Result Value Ref Range     (H) 26 - 140 U/L   CK-MB,QUANT. Collection Time: 12/24/21  5:45 AM   Result Value Ref Range    CK - MB 3.8 ng/mL   MAGNESIUM    Collection Time: 12/24/21  5:45 AM   Result Value Ref Range    Magnesium 2.2 1.7 - 2.8 mg/dL       Radiologic Studies -   [unfilled]  CT Results  (Last 48 hours)    None        CXR Results  (Last 48 hours)               12/24/21 0603  XR CHEST PORT Final result    Impression:  --------------       Body habitus and portable technique limits evaluation. There is no evidence for   active cardiopulmonary disease.        Narrative:  ---------------------------------------------------------------------------   <<<<<<<<<           [facility]  [facility]           >>>>>>>>>    ---------------------------------------------------------------------------       CLINICAL HISTORY: Shortness of breath. COMPARISON EXAMINATIONS:  October 7, 2021.           ---  SINGLE FRONTAL VIEW OF THE CHEST  ---       There is some limitation related to technique and body habitus. There is no   definitive focal consolidation or evidence for significant pleural effusion. The   mediastinum is unremarkable in appearance. No significant osseous abnormalities   are identified.             --------------             Medical Decision Making and ED Course   - I am the first and primary provider for this patient AND AM THE PRIMARY PROVIDER OF RECORD. - I reviewed the vital signs, available nursing notes, past medical history, past surgical history, family history and social history. - Initial assessment performed. The patients presenting problems have been discussed, and the staff are in agreement with the care plan formulated and outlined with them. I have encouraged them to ask questions as they arise throughout their visit. Vital Signs-Reviewed the patient's vital signs. Patient Vitals for the past 12 hrs:   Temp Pulse Resp BP SpO2   12/24/21 0622 -- -- -- -- 100 %   12/24/21 0526 98.2 °F (36.8 °C) 69 17 134/86 99 %       EKG interpretation: (Preliminary): Performed at 17:38, and read at 17:38  Rhythm: normal sinus rhythm; and regular . Rate (approx.): 67; Axis: normal; AR interval: normal; QRS interval: normal ; ST/T wave: normal; Other findings: .      Records Reviewed: Nursing Notes and Old Medical Records    The patient presents with shortness of breath with a differential diagnosis of asthma exacerbation, volume overload, CHF, pneumonia, effusions, pneumothorax, medication reaction    ED Course:     Patient given a DuoNeb at arrival, required further nebs due to persistent wheezing. Pulse ox remained 100%. Wheezing improved. Her potassium however came back 7.3. EKG with new acute changes. She was given 1 amp of calcium gluconate, D50 1 amp, bicarb 1 amp, insulin 10 units IV.  She has already been nebulized with albuterol. I have wheezing also improved on repeat exam after above meds. Chest x-ray shows no significant volume overload. Discussed with Dr. Juana Rene her nephrologist who advised will call in orders for patient to be dialyzed this morning. Discussed with Janeth Alberto who will admit patient to ICU. Provider Notes (Medical Decision Making):               Consultations:       Consultations:         Procedures and Critical Care             HYPERTENSION COUNSELING: Education was provided to the patient today regarding their hypertension. Patient is made aware of their elevated blood pressure and is instructed to follow up this week with their Primary Care for a recheck. Patient is counseled regarding consequences of chronic, uncontrolled hypertension including kidney disease, heart disease, stroke or even death. Patient states their understanding and agrees to follow up this week. Additionally, during their visit, I discussed sodium restriction, maintaining ideal body weight and regular exercise program as physiologic means to achieve blood pressure control. The patient will strive towards this. CRITICAL CARE NOTE :  5:49 AM  Amount of Critical Care Time: 50(minutes)    IMPENDING DETERIORATION -Respiratory and Metabolic  ASSOCIATED RISK FACTORS - Hypoxia, Dysrhythmia and Metabolic changes  MANAGEMENT- Bedside Assessment  INTERPRETATION -  Xrays and ECG  INTERVENTIONS - Metobolic interventions and aspiratory  CASE REVIEW - Hospitalist/Intensivist and Medical Sub-Specialist  TREATMENT RESPONSE -Improved  PERFORMED BY - Physician    NOTES   :  I have spent critical care time involved in lab review, consultations with specialist, family decision- making, bedside attention and documentation. This time excludes time spent in any separate billed procedures. During this entire length of time I was immediately available to the patient .     Fabio Byers MD        Disposition Disposition: Admitted to ICU Medical ICU the case was discussed with the admitting physician Rayn    Diagnosis:   1. Acute hyperkalemia    2. Mild persistent asthma with acute exacerbation    3. ESRD needing dialysis ([address]    4. Atypical chest pain                Diagnosis     Clinical Impression:   1. Acute hyperkalemia    2. Mild persistent asthma with acute exacerbation    3. ESRD needing dialysis ([address]    4. Atypical chest pain        Attestations:    Desmond Long MD    Please note that this dictation was completed with [employer], the [employer] voice recognition software. Quite often unanticipated grammatical, syntax, homophones, and other interpretive errors are inadvertently transcribed by the computer software. Please disregard these errors. Please excuse any errors that have escaped final proofreading. Thank you.

## 2021-12-24 NOTE — PROGRESS NOTES
Reason for Admission: Chart reviewed and noted patient presented with C/O SOB. 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 She began sneezing and coughing with progressive SOB. She states she had to use her inhalers more than usual. She has used an inhaler a few more times and also got on her nebulizer machine with little relief. She has a dry cough, she sneezes and she is wheezing with SOB. She also describes heaviness in her chest and that it is persistent. She usually gets this with her asthma. Dx: Asthma    PMH: ESRD, Asthma, HTN, Hypercholesterolemia, GERD, Arrhythmias, Suspect A-Fib, Anemia,Obesity                        RUR Score: 25%                 PCP: First and Last name:   Cynthia Triplett MD     Name of Practice:    Are you a current patient: Yes/No:    Approximate date of last visit:    Can you participate in a virtual visit if needed:     Do you (patient/family) have any concerns for transition/discharge? No                  Plan for utilizing home health: TBD      Current Advanced Directive/Advance Care Plan:  Prior- No ACP      Healthcare Decision Maker: Ajith Sands  Click here to complete [address] including selection of the Healthcare Decision Maker Relationship (ie \"Primary\")            Primary Decision Maker: Charlesetta Galeazzi - Daughter - [phone]    Transition of Care Plan: TBD    Care Management Intervention  PCP Verifies by CM- Yes  Transition of Care Consult (CM Consult): Discharge Planning  Current Support Network: Ekta Stefani- [phone]. Patient lives at home with her sister and doesn't use any DME. She plans to return back home at discharge.

## 2021-12-24 NOTE — PROGRESS NOTES
Problem: Risk for Spread of Infection  Goal: Prevent transmission of infectious organism to others  Description: Prevent the transmission of infectious organisms to other patients, staff members, and visitors.   Outcome: Progressing Towards Goal     Problem: Patient Education:  Go to Education Activity  Goal: Patient/Family Education  Outcome: Progressing Towards Goal     Problem: Chronic Renal Failure  Goal: *Fluid and electrolytes stabilized  Outcome: Progressing Towards Goal     Problem: Patient Education: Go to Patient Education Activity  Goal: Patient/Family Education  Outcome: Progressing Towards Goal

## 2021-12-24 NOTE — DIALYSIS
Hemodialysis / [phone]    Vitals Pre Post Assessment Pre Post   BP BP: (!) 140/69 (12/24/21 1335) 138/76 LOC A & O x 4 A & O x 4   HR Pulse (Heart Rate): 76 (12/24/21 1335) 79 Lungs States intermittent SOB with ongoing asthma / wheezes noted / room air States no distress / room air   Resp Resp Rate: 20 (12/24/21 1320) 20 Cardiac Regular rate, rhythm Regular rate, rhythm   Temp Temp: 98.2 °F (36.8 °C) (12/24/21 1320) 98.1 Skin No uncovered wounds noted No uncovered wounds noted   Weight  monitor by hospital staff  Edema Trace generalized decreased   Tele status Monitor by hospital staff  Pain Pain Intensity 1: 0 (12/24/21 0852) none     Orders   Duration: Start: 1320 End: 1620 Total: 3   Dialyzer: Dialyzer/Set Up Inspection: Max Ka (12/24/21 1320)   K Bath: Dialysate K (mEq/L): 1 (12/24/21 1320)   Ca Bath: Dialysate CA (mEq/L): 2.5 (12/24/21 1320)   Na:     Bicarb: Dialysate HCO3 (mEq/L): 35 (12/24/21 1320)   Target Fluid Removal: Goal/Amount of Fluid to Remove (mL): 1200 mL (12/24/21 1320)     Access   Type & Location: LUE AVF, no s/s of infection / bruit and thrill  Noted / cannulate 16 gg without difficulty   Comments:                                        Labs   HBsAg (Antigen) / date:    12/03/21 negative                                         HBsAb (Antibody) / date: 11/01/21 susceptible   Source: Clinic labs   Obtained/Reviewed  Critical Results Called HGB   Date Value Ref Range Status   12/24/2021 9.1 (L) 12.0 - 16.0 g/dL Final     Potassium   Date Value Ref Range Status   12/24/2021 7.3 (HH) 3.2 - 5.1 mmol/L Final     Comment:     CALLED TO AND READ BACK BY CRITICAL RESULTS CALLED TO ER TO DEANGELO BALBUENA BY NICKO [id number]. RBV. SPECIMEN IS NOT HEMOLYSED.      Calcium   Date Value Ref Range Status   12/24/2021 8.1 (L) 8.5 - 10.5 mg/dL Final     BUN   Date Value Ref Range Status   12/24/2021 96 (H) 9 - 21 mg/dL Final     Creatinine   Date Value Ref Range Status   12/24/2021 10.50 (H) 0.70 - 1.20 mg/dL Final        Meds Given   Name Dose Route   NA                 Adequacy / Fluid    Total Liters Process: 47   Net Fluid Removed: 1200 ml      Comments   Time Out Done:   (Time) 1315   Admitting Diagnosis: SOB   Consent obtained/signed: Informed Consent Verified: Yes (12/24/21 1320)   Machine / RO # Machine Number: [id number] (12/24/21 1320)   Primary Nurse Rpt Pre: Michael Shaffer RN   Primary Nurse Rpt PostJaime Edward MCKENZIE   Pt Education: Access care   Care Plan: Continue with dialysis regimen as ordered   Pts outpatient clinic: [facility]     Tx Summary   Comments:          No pain, no acute distress / nurse supervisor Lajuan Favre RN rounding often in dialysis suite to check on patients / rested much of treatment  / post treatment, all possible blood returned / hemostasis   / STATES FEELS 'SO MUCH BETTER' POST DIALYSIS

## 2021-12-24 NOTE — PROGRESS NOTES
Called from er about patients  Admission for chest p[allyssa llanosalemia  She had missed her last dialtsis tx  Nurse contacted and orders placed for dialysis today

## 2021-12-25 VITALS
BODY MASS INDEX: 32.28 KG/M2 | OXYGEN SATURATION: 97 % | WEIGHT: 171 LBS | SYSTOLIC BLOOD PRESSURE: 147 MMHG | HEART RATE: 76 BPM | HEIGHT: 61 IN | DIASTOLIC BLOOD PRESSURE: 62 MMHG | RESPIRATION RATE: 16 BRPM | TEMPERATURE: 97.9 F

## 2021-12-25 LAB
ANION GAP SERPL CALC-SCNC: 15 MMOL/L
BASOPHILS # BLD: 0 K/UL (ref 0–0.1)
BASOPHILS NFR BLD: 0 % (ref 0–2)
BUN SERPL-MCNC: 52 MG/DL (ref 9–21)
BUN/CREAT SERPL: 9
CA-I BLD-MCNC: 8.1 MG/DL (ref 8.5–10.5)
CHLORIDE SERPL-SCNC: 99 MMOL/L (ref 94–111)
CO2 SERPL-SCNC: 24 MMOL/L (ref 21–33)
CREAT SERPL-MCNC: 6.1 MG/DL (ref 0.7–1.2)
DIFFERENTIAL METHOD BLD: ABNORMAL
EOSINOPHIL # BLD: 0 K/UL (ref 0–0.4)
EOSINOPHIL NFR BLD: 0 % (ref 0–5)
ERYTHROCYTE [DISTWIDTH] IN BLOOD BY AUTOMATED COUNT: 15.6 % (ref 11.6–14.5)
GLUCOSE SERPL-MCNC: 114 MG/DL (ref 70–110)
HBV SURFACE AG SER QL: <0.1 INDEX
HBV SURFACE AG SER QL: NEGATIVE
HCT VFR BLD AUTO: 25.1 % (ref 35–45)
HGB BLD-MCNC: 8 G/DL (ref 12–16)
IMM GRANULOCYTES # BLD AUTO: 0 K/UL
IMM GRANULOCYTES NFR BLD AUTO: 0 %
LYMPHOCYTES # BLD: 1.2 K/UL (ref 0.9–3.6)
LYMPHOCYTES NFR BLD: 19 % (ref 21–52)
MAGNESIUM SERPL-MCNC: 2 MG/DL (ref 1.7–2.8)
MCH RBC QN AUTO: 35.9 PG (ref 24–34)
MCHC RBC AUTO-ENTMCNC: 31.9 G/DL (ref 31–37)
MCV RBC AUTO: 112.6 FL (ref 78–100)
MONOCYTES # BLD: 0.2 K/UL (ref 0.05–1.2)
MONOCYTES NFR BLD: 4 % (ref 3–10)
NEUTS BAND NFR BLD MANUAL: 1 % (ref 0–5)
NEUTS SEG # BLD: 4.8 K/UL (ref 1.8–8)
NEUTS SEG NFR BLD: 76 % (ref 40–73)
NRBC # BLD: 0 K/UL (ref 0–0.01)
NRBC BLD-RTO: 0 PER 100 WBC
PHOSPHATE SERPL-MCNC: 4.5 MG/DL (ref 2.5–4.5)
PLATELET # BLD AUTO: 157 K/UL (ref 135–420)
PMV BLD AUTO: 10 FL (ref 9.2–11.8)
POTASSIUM SERPL-SCNC: 4.2 MMOL/L (ref 3.2–5.1)
RBC # BLD AUTO: 2.23 M/UL (ref 4.2–5.3)
RBC MORPH BLD: ABNORMAL
SODIUM SERPL-SCNC: 138 MMOL/L (ref 135–145)
WBC # BLD AUTO: 6.2 K/UL (ref 4.6–13.2)

## 2021-12-25 PROCEDURE — 74011250637 HC RX REV CODE- 250/637: Performed by: NURSE PRACTITIONER

## 2021-12-25 PROCEDURE — 80048 BASIC METABOLIC PNL TOTAL CA: CPT

## 2021-12-25 PROCEDURE — 36415 COLL VENOUS BLD VENIPUNCTURE: CPT

## 2021-12-25 PROCEDURE — 85025 COMPLETE CBC W/AUTO DIFF WBC: CPT

## 2021-12-25 PROCEDURE — 84100 ASSAY OF PHOSPHORUS: CPT

## 2021-12-25 PROCEDURE — 74011000250 HC RX REV CODE- 250: Performed by: INTERNAL MEDICINE

## 2021-12-25 PROCEDURE — 94761 N-INVAS EAR/PLS OXIMETRY MLT: CPT

## 2021-12-25 PROCEDURE — 83735 ASSAY OF MAGNESIUM: CPT

## 2021-12-25 PROCEDURE — 74011250637 HC RX REV CODE- 250/637: Performed by: INTERNAL MEDICINE

## 2021-12-25 PROCEDURE — 94640 AIRWAY INHALATION TREATMENT: CPT

## 2021-12-25 PROCEDURE — 87340 HEPATITIS B SURFACE AG IA: CPT

## 2021-12-25 RX ORDER — GUAIFENESIN 100 MG/5ML
100 SOLUTION ORAL
Status: DISCONTINUED | OUTPATIENT
Start: 2021-12-25 | End: 2021-12-25 | Stop reason: HOSPADM

## 2021-12-25 RX ADMIN — ATORVASTATIN CALCIUM 10 MG: 10 TABLET, FILM COATED ORAL at 08:09

## 2021-12-25 RX ADMIN — SEVELAMER CARBONATE 800 MG: 800 TABLET, FILM COATED ORAL at 08:10

## 2021-12-25 RX ADMIN — DICYCLOMINE HYDROCHLORIDE 10 MG: 10 CAPSULE ORAL at 08:09

## 2021-12-25 RX ADMIN — SUCRALFATE 1 G: 1 TABLET ORAL at 06:31

## 2021-12-25 RX ADMIN — AMIODARONE HYDROCHLORIDE 200 MG: 200 TABLET ORAL at 08:09

## 2021-12-25 RX ADMIN — LEVOTHYROXINE SODIUM 50 MCG: 0.05 TABLET ORAL at 08:14

## 2021-12-25 RX ADMIN — AMLODIPINE BESYLATE 10 MG: 5 TABLET ORAL at 08:09

## 2021-12-25 RX ADMIN — FAMOTIDINE 20 MG: 20 TABLET ORAL at 08:09

## 2021-12-25 RX ADMIN — GUAIFENESIN 100 MG: 100 SOLUTION ORAL at 01:11

## 2021-12-25 RX ADMIN — IPRATROPIUM BROMIDE AND ALBUTEROL SULFATE 3 ML: .5; 2.5 SOLUTION RESPIRATORY (INHALATION) at 03:07

## 2021-12-25 RX ADMIN — GUAIFENESIN 100 MG: 100 SOLUTION ORAL at 05:00

## 2021-12-25 RX ADMIN — GABAPENTIN 200 MG: 100 CAPSULE ORAL at 08:14

## 2021-12-25 RX ADMIN — APIXABAN 2.5 MG: 2.5 TABLET, FILM COATED ORAL at 08:10

## 2021-12-25 RX ADMIN — LOSARTAN POTASSIUM 50 MG: 25 TABLET, FILM COATED ORAL at 08:14

## 2021-12-25 RX ADMIN — CARVEDILOL 6.25 MG: 6.25 TABLET, FILM COATED ORAL at 08:09

## 2021-12-25 NOTE — DISCHARGE SUMMARY
Discharge Summary       PATIENT ID: Guido Nichols  MRN: [id number]   YOB: 1955    DATE OF ADMISSION: 12/24/2021  5:25 AM    DATE OF DISCHARGE: 12/25/21    PRIMARY CARE PROVIDER: Hany Moser MD     ATTENDING PHYSICIAN: Kapil Rothman MD  DISCHARGING PROVIDER: Kapil Rothman MD        CONSULTATIONS: None    PROCEDURES/SURGERIES: * No surgery found *    ADMITTING [facility] COURSE:   Benitojesica Pacheco y. o. female with a past medical history significant Multiple medical problems, including end-stage renal disease and is dialyzed at [facility] twice a week, Mondays and Fridays, is due for dialysis noon today, history of asthma, hypertension, hypercholesterolemia, GERD, arrhythmias, suspect A. fib is on Eliquis, anemia, obesity presents to the ED via EMS with cc of shortness of breath.  Patient states that this began 2 days ago while she was putting up [place] tree.  She began sneezing and coughing with progressive shortness of breath.  She states she  had to use her inhalers more than usual.  She  used inhaler a few more times this morning and also got on her nebulizer machine with little relief.  She has a dry cough, she sneezes, and she is wheezing with shortness of breath.  She also describes heaviness in her chest since yesterday afternoon that is persistent.  She usually gets this with her asthma though.     We were asked to admit for work up and evaluation of the above problems.          DISCHARGE DIAGNOSES / PLAN:      Assessment / Plan:     Hyperkalemia in setting esrd  - admit, tele  - needs HD today  - sp calcium, insulin in ed  - recheck after hd     ESRD  - neprhology consulted, cont vitamins     Asthma  - suspect current sob is more to overload, hd first, then re -eval need for steriods, ok for duonebs as needed     HTN  - cont home meds     gerd  - cont h2 blocker      Day of dc  Pt feels amazing, much improved after hd  She will discuss the possiblity of needed 3x per week hd if these episodes continue  Her k is down to 4.2 from 7.3  Next hd Monday  Ok for dc    FOLLOW UP APPOINTMENTS:    Follow-up Information     Follow up With Specialties Details Why Contact Info    Brittany Retana MD Family Medicine In 1 week Hospital follow up [address]oe [phone]               [place]:  Current Discharge Medication List      CONTINUE these medications which have NOT CHANGED    Details   albuterol (PROVENTIL VENTOLIN) 2.5 mg /3 mL (0.083 %) nebu USE 1 VIAL VIA NEBULIZER THREE TIMES DAILY  Qty: 90 mL, Refills: 3    Associated Diagnoses: Mild intermittent asthma without complication      !! gabapentin (NEURONTIN) 100 mg capsule 2 capsules TID  Qty: 540 Capsule, Refills: 0    Associated Diagnoses: Facet arthropathy; DDD (degenerative disc disease), lumbar; Spinal stenosis of lumbar region, unspecified whether neurogenic claudication present; Lumbar neuritis      sucralfate (CARAFATE) 1 gram tablet TAKE 1 TABLET BY MOUTH FOUR TIMES DAILY  Qty: 360 Tablet, Refills: 0      amLODIPine (NORVASC) 10 mg tablet Take 1 Tablet by mouth daily.   Qty: 30 Tablet, Refills: 1    Associated Diagnoses: Hypertension, unspecified type      famotidine (PEPCID) 20 mg tablet TAKE 1 TABLET BY MOUTH TWICE DAILY  Qty: 30 Tablet, Refills: 1    Associated Diagnoses: Gastroesophageal reflux disease without esophagitis      meclizine (ANTIVERT) 25 mg tablet TAKE 1 TABLET BY MOUTH THREE TIMES DAILY FOR UP TO 10 DAYS AS NEEDED FOR DIZZINESS  Qty: 21 Tablet, Refills: 0    Associated Diagnoses: Dizziness      !! gabapentin (Neurontin) 100 mg capsule Take 2 tablets by mouth three times daily  Qty: 180 Capsule, Refills: 1    Associated Diagnoses: Spinal stenosis of lumbar region, unspecified whether neurogenic claudication present; Lumbar neuritis      amiodarone (CORDARONE) 200 mg tablet TAKE 1 TABLET BY MOUTH EVERY DAY  Qty: 90 Tablet, Refills: 2      valGANciclovir (VALCYTE) 450 mg tablet TAKE 2 TABLETS BY MOUTH DAILY  Qty: 90 Tablet, Refills: 5    Associated Diagnoses: ESRD (end stage renal disease) on dialysis ([facility])      apixaban (Eliquis DVT-PE Treat 30D Start) 5 mg (74 tabs) starter pack Take 5 mg by mouth twice a day for 7 days Followed by 2.5 mg by mouth twice a day  Qty: 1 Dose Pack, Refills: 0      levothyroxine (SYNTHROID) 50 mcg tablet Take 1 Tablet by mouth daily. Qty: 90 Tablet, Refills: 3      carvediloL (COREG) 6.25 mg tablet Take 1 Tablet by mouth two (2) times daily (with meals). Qty: 180 Tablet, Refills: 2    Associated Diagnoses: Hypertension, unspecified type      dexlansoprazole (Dexilant) 60 mg CpDB capsule (delayed release) Take 1 Capsule by mouth daily. Qty: 30 Capsule, Refills: 5    Associated Diagnoses: Gastroesophageal reflux disease with esophagitis without hemorrhage      hyoscyamine SL (LEVSIN/SL) 0.125 mg SL tablet 1 Tablet by SubLINGual route every four (4) hours as needed (irritable bowel). Qty: 30 Tablet, Refills: 5    Associated Diagnoses: Gastroesophageal reflux disease with esophagitis without hemorrhage      losartan (COZAAR) 50 mg tablet Take 1 Tablet by mouth daily. Qty: 90 Tablet, Refills: 3    Associated Diagnoses: Hypertension, unspecified type      calcitRIOL (ROCALTROL) 0.25 mcg capsule Take 1 Capsule by mouth as directed. Take on non dialysis days      doxercalciferol (HECTOROL IV) 14 mcg by IntraVENous route two (2) days a week.      lidocaine-prilocaine (EMLA) topical cream as directed. Wixela Inhub 250-50 mcg/dose diskus inhaler INHALE 1 PUFF BY MOUTH EVERY 12 HOURS  Qty: 60 Each, Refills: 3    Associated Diagnoses: Seasonal allergic rhinitis due to pollen      RenaPlex-D 800 mcg-12.5 mg -2,000 unit tab Take 1 Tablet by mouth daily. sevelamer carbonate (RENVELA) 800 mg tab tab Take 800 mg by mouth three (3) times daily. zolpidem (AMBIEN) 5 mg tablet Take 5 mg by mouth nightly as needed.       dicyclomine (BENTYL) 10 mg capsule TAKE 1 CAPSULE BY MOUTH FOUR TIMES DAILY  Qty: 120 Cap, Refills: 1      simvastatin (ZOCOR) 20 mg tablet TAKE 1 TABLET BY MOUTH DAILY AS DIRECTED. Qty: 90 Tab, Refills: 1      aluminum & magnesium hydroxide-simethicone (Maalox Maximum Strength) 400-400-40 mg/5 mL suspension Take 10 mL by mouth every six (6) hours as needed for Indigestion. Qty: 250 mL, Refills: 0      ESTRACE 0.01 % (0.1 mg/gram) vaginal cream INSERT 2 GRAMS INTO VAGINA EVERY MONDAY AND THURSDAY  Qty: 42.5 g, Refills: 5      cranberry extract 425 mg cap 425 mg. LORazepam (ATIVAN) 0.5 mg tablet Take 0.5 mg by mouth daily. fluticasone (FLONASE) 50 mcg/actuation nasal spray 1 Spray. EPINEPHrine (EPIPEN) 0.3 mg/0.3 mL injection 0.3 mg.       !! - Potential duplicate medications found. Please discuss with provider. NOTIFY YOUR PHYSICIAN FOR ANY OF THE FOLLOWING:   Fever over 101 degrees for 24 hours. Chest pain, shortness of breath, fever, chills, nausea, vomiting, diarrhea, change in mentation, falling, weakness, bleeding. Severe pain or pain not relieved by medications. Or, any other signs or symptoms that you may have questions about. PHYSICAL EXAMINATION AT DISCHARGE:  General:          Alert, cooperative, no distress, appears stated age. HEENT:           Atraumatic, anicteric sclerae, pink conjunctivae                          No oral ulcers, mucosa moist, throat clear, dentition fair  Neck:               Supple, symmetrical  Lungs:             Clear to auscultation bilaterally. No Wheezing or Rhonchi. No rales. Chest wall:      No tenderness  No Accessory muscle use. Heart:              Regular  rhythm,  No  murmur   No edema  Abdomen:        Soft, non-tender. Not distended. Bowel sounds normal  Extremities:     No cyanosis. No clubbing,                            Skin turgor normal, Capillary refill normal  Skin:                Not pale.   Not Jaundiced  No rashes   Psych: Not anxious or agitated.   Neurologic:      Alert, moves all extremities, answers questions appropriately and responds to commands       [address]:  Problem List as of 12/25/2021 Date Reviewed: 12/22/2021          Codes Class Noted - Resolved    Atypical chest pain ICD-10-CM: R07.89  ICD-9-CM: 786.59  12/24/2021 - Present        ESRD needing dialysis [facility]) ICD-10-CM: N18.6, Z99.2  ICD-9-CM: 585.6  12/24/2021 - Present        Mild persistent asthma with (acute) exacerbation ICD-10-CM: J45.31  ICD-9-CM: 493.92  12/24/2021 - Present        Intractable vomiting ICD-10-CM: R11.10  ICD-9-CM: 536.2  8/9/2021 - Present        Left leg pain ICD-10-CM: M79.605  ICD-9-CM: 729.5  7/14/2021 - Present        Acute hyperkalemia ICD-10-CM: E87.5  ICD-9-CM: 276.7  6/12/2021 - Present        Encounter for cholecystectomy ICD-10-CM: Z76.89  ICD-9-CM: V65.8  5/6/2021 - Present    Overview Signed 5/6/2021  9:13 AM by Gurjit Meneses MD     7/2020             Acute left-sided low back pain without sciatica ICD-10-CM: M54.50  ICD-9-CM: 724.2  5/6/2021 - Present        Atrial fibrillation ([facility] ICD-10-CM: I48.91  ICD-9-CM: 427.31  5/6/2021 - Present        Chronic anticoagulation ICD-10-CM: Z79.01  ICD-9-CM: V58.61  5/6/2021 - Present        Stomatitis ICD-10-CM: K12.1  ICD-9-CM: 528.00  3/2/2021 - Present        Thrush of mouth and esophagus (HCC) ICD-10-CM: B37.81, B37.0  ICD-9-CM: 112.84, 112.0  3/2/2021 - Present        ESRD (end stage renal disease) on dialysis [facility]) ICD-10-CM: N18.6, Z99.2  ICD-9-CM: 585.6, V45.11  12/28/2020 - Present        History of DVT (deep vein thrombosis) ICD-10-CM: S50.981  ICD-9-CM: V12.51  12/28/2020 - Present        Hypothyroidism ICD-10-CM: E03.9  ICD-9-CM: 244.9  12/24/2020 - Present        Vertigo ICD-10-CM: R42  ICD-9-CM: 780.4  12/24/2020 - Present        Calculus of gallbladder with acute cholecystitis without obstruction ICD-10-CM: K80.00  ICD-9-CM: 574.00  10/9/2020 - Present Acute recurrent maxillary sinusitis ICD-10-CM: J01.01  ICD-9-CM: 461.0  8/6/2020 - Present        Ulcer ICD-10-CM: ALI2212  ICD-9-CM: 707.9  Unknown - Present        Obesity ICD-10-CM: E66.9  ICD-9-CM: 278.00  Unknown - Present        Migraine ICD-10-CM: Y28.544  ICD-9-CM: 346.90  Unknown - Present        Hypertension ICD-10-CM: I10  ICD-9-CM: 401.9  Unknown - Present        Hypercholesteremia ICD-10-CM: E78.00  ICD-9-CM: 272.0  Unknown - Present        GERD (gastroesophageal reflux disease) ICD-10-CM: K21.9  ICD-9-CM: 530.81  Unknown - Present        Burning with urination ICD-10-CM: R30.0  ICD-9-CM: 788.1  Unknown - Present    Overview Signed 2/13/2017 12:02 PM by Jacobo Tseching A     frequent uti             Arrhythmia ICD-10-CM: I49.9  ICD-9-CM: 427.9  Unknown - Present        Hyperkalemia ICD-10-CM: E87.5  ICD-9-CM: 276.7  11/13/2016 - Present        Pruritus ICD-10-CM: L29.9  ICD-9-CM: 698.9  9/29/2016 - Present        Chronic kidney disease, stage III (moderate) ([facility] ICD-10-CM: N18.30  ICD-9-CM: 585.3  9/27/2016 - Present        Recurrent urinary tract infection ICD-10-CM: N39.0  ICD-9-CM: 599.0  9/27/2016 - Present        Nausea and vomiting ICD-10-CM: R11.2  ICD-9-CM: 787.01  4/10/2016 - Present        Diverticulitis of intestine ICD-10-CM: K57.92  ICD-9-CM: 562.11  4/2/2016 - Present        Cystic disease of kidney ICD-10-CM: Q61.9  ICD-9-CM: 753.10  3/16/2015 - Present        Gastritis and duodenitis ICD-10-CM: K29.90  ICD-9-CM: 535.50  2/23/2015 - Present        Anemia ICD-10-CM: D64.9  ICD-9-CM: 285.9  11/10/2014 - Present        Disease due to gram-negative bacillus ICD-10-CM: B96.89  ICD-9-CM: 041.85  10/17/2014 - Present    Overview Signed 2/13/2017 11:58 AM by Jacobo LLANOS     Overview:   Acinetobacter baumannii Septicemia and UTI 10/14/2014 (cultures at [facility])             Fever ICD-10-CM: R50.9  ICD-9-CM: 780.60  10/14/2014 - Present        Drug-induced hyperglycemia ICD-10-CM: R73.9, T50.905A  ICD-9-CM: 790.29, E947.9  6/28/2014 - Present        Exacerbation of asthma ICD-10-CM: J45.901  ICD-9-CM: 493.92  6/25/2014 - Present        Systemic inflammatory response syndrome (SIRS) (HCC) ICD-10-CM: R65.10  ICD-9-CM: 995.90  6/23/2014 - Present        Kidney transplant rejection ICD-10-CM: T86.11  ICD-9-CM: 996.81  4/10/2014 - Present    Overview Signed 2/13/2017 11:58 AM by Jeaneth Medina     Overview:   Collected: Irlanda Perez Dr: Aylin Taylor MD    Case Number: [id number]  [address] [place] [address]    Case Number: [id number]    DIAGNOSIS:  ----------  ALLOGRAFT KIDNEY, NEEDLE BIOPSY (14 YEARS, 2 MONTHS):  - SUBOPTIMAL SPECIMEN: RENAL MEDULLA, INCLUDING DEEP MEDULLA WITH  FOCAL BENIGN UROTHELIAL EPITHELIUM. - MILD NEUTROPHILIC TUBULITIS WITH INTRALUMINAL NEUTROPHILS,  CORRELATE WITH URINE CULTURE TO RULE OUT BACTERIAL INFECTION. - MILD LYMPHOCYTIC TUBULITIS CONSISTENT WITH BORDERLINE CHANGE:  (\"SUSPICIOUS\" FOR ACUTE CELLULAR REJECTION) AS PER BANFF SCHEMA. - CONGESTED PERITUBULAR CAPILLARIES (NON-SPECIFIC), BUT RENAL  VEIN THROMBOSIS OR STENOSIS SHOULD BE EXCLUDED CLINICALLY. - FOCAL SMALL INTERSTITIAL COLLECTION OF CAST MATERIAL  (NON-SPECIFIC), BUT  URINARY OBSTRUCTION SHOULD BE EXCLUDED CLINICALLY. - NO DEFINITIVE STAINING FOR POLYOMAVIRUSES (SEE DESCRIPTION). - FOCAL C4D REACTIVITY ALONG PERITUBULAR CAPILLARY WALLS WITH  HIGH NON-SPECIFIC BACKGROUND STAINING; SIGNIFICANCE UNCERTAIN AND  CORRELATION WITH FLOW PRA IS SUGGESTED TO EXCLUDE EARLY HUMORAL  REJECTION. - SMALL VESSEL SCLEROSIS, MILD TO MODERATE TO SEVERE.  - TUBULAR ATROPHY AND INTERSTITIAL FIBROSIS CANNOT BE ADEQUATELY  ASSESSED  IN THE ABSENCE OF RENAL CORTEX.     Re-Bx - Collected: Haydee Garcia Dr: Aylin Taylor MD    Case Number: [id number]  [address] [place] [address]    Case Number: [id number]    DIAGNOSIS:  ----------  ALLOGRAFT KIDNEY, NEEDLE BIOPSY (14 YEARS, 2 MONTHS):  - BORDERLINE CHANGE (\"SUSPICIOUS\" FOR ACUTE CELLULAR REJECTION)  TO FOCAL  MILD ACUTE CELLULAR REJECTION (BANFF TYPE 1A) (SEE COMMENT). - MILD NEUTROPHILIC INTERSTITIAL INFLAMMATION, CORRELATE WITH  URINE CULTURE  TO RULE OUT SUPERIMPOSED BACTERIAL INFECTION. - SMALL INTERSTITIAL COLLECTIONS OF PAS-POSITIVE CAST MATERIAL  AND CYSTICALLY DILATED PACKER'S SPACE WITH PAS-POSITIVE  PROTEINACEOUS FILTRATE (SEE  COMMENT). - ACUTE ISCHEMIC-TYPE TUBULAR INJURY IS NOTED.  - FOCAL C4D REACTIVITY ALONG PERITUBULAR CAPILLARY WALLS;  SIGNIFICANCE  UNCERTAIN AND CORRELATION WITH FLOW PRA IS SUGGESTED TO EXCLUDE  EARLY  HUMORAL REJECTION. - NEGATIVE IMMUNOSTAIN FOR POLYOMAVIRUSES (BK, HOMER). - CHRONIC CHANGES: GLOBAL SCLEROSIS IN APPROXIMATELY 14 OUT OF 60  (23%)  GLOMERULI, FOCAL SEGMENTAL GLOMERULOSCLEROSIS IN APPROXIMATELY  2 OUT OF  46 (4%) NON-OBSOLESCENT GLOMERULI, MODERATE TO SEVERE  ARTERIOSCLEROSIS,  MILD TO MODERATE TO SEVERE ARTERIOLAR HYALINE SCLEROSIS, AND  MODERATE  INTERSTITIAL FIBROSIS/TUBULAR ATROPHY (SEE COMMENT).              Acute renal failure ([facility] ICD-10-CM: N17.9  ICD-9-CM: 584.9  4/7/2014 - Present        Renal transplant disorder ICD-10-CM: T86.10  ICD-9-CM: 996.81  4/7/2014 - Present        Systemic infection ([facility] ICD-10-CM: A41.9  ICD-9-CM: 038.9  1/7/2014 - Present        Asthma ICD-10-CM: J45.909  ICD-9-CM: 493.90  8/21/2013 - Present        Chronic obstructive pulmonary disease ([facility] ICD-10-CM: J44.9  ICD-9-CM: 748  8/21/2013 - Present        Diverticular disease of large intestine ICD-10-CM: K57.30  ICD-9-CM: 562.10  8/21/2013 - Present        Essential hypertension ICD-10-CM: I10  ICD-9-CM: 401.9  8/21/2013 - Present        Seasonal allergic rhinitis due to pollen ICD-10-CM: J30.1  ICD-9-CM: 477.0  8/21/2013 - Present        Hyperlipidemia ICD-10-CM: E78.5  ICD-9-CM: 272.4  8/21/2013 - Present        UTI (urinary tract infection) ICD-10-CM: N39.0  ICD-9-CM: 599.0 8/21/2013 - Present        Fecal incontinence ICD-10-CM: R15.9  ICD-9-CM: 787.60  4/17/2013 - Present        History of kidney transplant ICD-10-CM: Z94.0  ICD-9-CM: V42.0  4/30/2012 - Present        CMV (cytomegalovirus) antibody positive ICD-10-CM: R76.8  ICD-9-CM: 795.79  4/30/2012 - Present    Overview Signed 2/13/2017 11:58 AM by Emir LLANOS     Overview:   Donor negative             Hematuria ICD-10-CM: R31.9  ICD-9-CM: 599.70  4/30/2012 - Present        Migraine without status migrainosus, not intractable ICD-10-CM: D10.215  ICD-9-CM: 346.90  2/24/2010 - Present        Renal cyst ICD-10-CM: N28.1  ICD-9-CM: 753.10  1/1/2002 - Present    Overview Signed 2/13/2017 12:01 PM by Emir LLANOS     kidney transplant              RESOLVED: Gastroesophageal reflux disease ICD-10-CM: K21.9  ICD-9-CM: 530.81  8/21/2013 - 8/3/2021        RESOLVED: Adiposity ICD-10-CM: E66.9  ICD-9-CM: 278.00  2/1/2010 - 8/3/2021              Greater than 35 minutes were spent with the patient on counseling and coordination of care    Signed:   Issac Ayoub MD  12/25/2021  8:53 AM

## 2021-12-25 NOTE — PROGRESS NOTES
Assumed care of patient. 1920-patient awake watching TV. Shift assessment completed. No needs voiced by patient. 2150-scheduled medications given per MAR. No needs voiced. CBWR    0115-PRN robitussin given for cough. No needs voiced. CBWR.     0307-RT in room. Patient requesting breathing treatment. 0630-scheduled medication given per MAR. No needs voiced. CBWR.

## 2021-12-25 NOTE — PROGRESS NOTES
0700- assumed care of pt, no voiced concerns at this time, VSS, cbwr    0730- assessment completed, lung sounds diminished at the bases, pt reports no pain or SOB at this time. 7028- scheduled medications given per MAR, pt prepped for discharge.

## 2021-12-26 LAB
ATRIAL RATE: 67 BPM
CALCULATED P AXIS, ECG09: 95 DEGREES
CALCULATED R AXIS, ECG10: 83 DEGREES
CALCULATED T AXIS, ECG11: 50 DEGREES
DIAGNOSIS, 93000: NORMAL
P-R INTERVAL, ECG05: 241 MS
Q-T INTERVAL, ECG07: 447 MS
QRS DURATION, ECG06: 109 MS
QTC CALCULATION (BEZET), ECG08: 472 MS
VENTRICULAR RATE, ECG03: 67 BPM

## 2021-12-27 ENCOUNTER — PATIENT OUTREACH (OUTPATIENT)
Dept: CASE MANAGEMENT | Age: 66
End: 2021-12-27

## 2022-01-08 DIAGNOSIS — R42 DIZZINESS: ICD-10-CM

## 2022-01-10 ENCOUNTER — TELEPHONE (OUTPATIENT)
Dept: ORTHOPEDIC SURGERY | Age: 67
End: 2022-01-10

## 2022-01-10 ENCOUNTER — VIRTUAL VISIT (OUTPATIENT)
Dept: FAMILY MEDICINE CLINIC | Age: 67
End: 2022-01-10
Payer: MEDICARE

## 2022-01-10 DIAGNOSIS — J45.31 MILD PERSISTENT ASTHMA WITH (ACUTE) EXACERBATION: ICD-10-CM

## 2022-01-10 DIAGNOSIS — Z09 HOSPITAL DISCHARGE FOLLOW-UP: Primary | ICD-10-CM

## 2022-01-10 PROCEDURE — 99442 PR PHYS/QHP TELEPHONE EVALUATION 11-20 MIN: CPT | Performed by: STUDENT IN AN ORGANIZED HEALTH CARE EDUCATION/TRAINING PROGRAM

## 2022-01-10 RX ORDER — MECLIZINE HYDROCHLORIDE 25 MG/1
TABLET ORAL
Qty: 21 TABLET | Refills: 0 | Status: SHIPPED | OUTPATIENT
Start: 2022-01-10 | End: 2022-04-12 | Stop reason: SDUPTHER

## 2022-01-10 RX ORDER — PREDNISONE 20 MG/1
40 TABLET ORAL
Qty: 10 TABLET | Refills: 0 | Status: SHIPPED | OUTPATIENT
Start: 2022-01-10 | End: 2022-01-15

## 2022-01-11 DIAGNOSIS — K21.00 GASTROESOPHAGEAL REFLUX DISEASE WITH ESOPHAGITIS WITHOUT HEMORRHAGE: ICD-10-CM

## 2022-01-11 NOTE — TELEPHONE ENCOUNTER
I have tried to call patient twice but her vm is not set up. If she should happen to call back, Per message received from her cardiologist on 12/1/21-Patient was to stay off completely.  I will try to call her again as well

## 2022-01-11 NOTE — PROGRESS NOTES
Odell Higgins is a 61 y.o. female who was seen by synchronous (real-time) audio technology on 1/10/2022. Consent: Odell Higgins, who was seen by synchronous (real-time) audio technology, and/or her healthcare decision maker, is aware that this patient-initiated, Telehealth encounter on 1/10/2022 is a billable service, with coverage as determined by her insurance carrier. She is aware that she may receive a bill and has provided verbal consent to proceed: Yes. Subjective:   Odell Higgins is a 61 y.o. female who was seen for No chief complaint on file. Patient was recently hospitalized for shortness of breath secondary to fluid overload and asthma exacerbation. She was given HD and her breathing improved. She is currently tolerating HD well and follows with nephrology. Her asthma acted up again and she has been more short of breath and wheezing. Denies any fevers. Home Medications    Medication Sig Start Date End Date Taking? Authorizing Provider   predniSONE (DELTASONE) 20 mg tablet Take 40 mg by mouth daily (with breakfast) for 5 days. 1/10/22 1/15/22 Yes Jourdan Christensen MD   meclizine (ANTIVERT) 25 mg tablet TAKE 1 TABLET BY MOUTH THREE TIMES DAILY FOR UP TO 10 DAYS AS NEEDED FOR DIZZINESS 1/10/22   Jourdan Christensen MD   albuterol (PROVENTIL VENTOLIN) 2.5 mg /3 mL (0.083 %) nebu USE 1 VIAL VIA NEBULIZER THREE TIMES DAILY 12/23/21   Jourdan Christensen MD   gabapentin (NEURONTIN) 100 mg capsule 2 capsules TID 12/22/21   Chaim Rondon MD   sucralfate (CARAFATE) 1 gram tablet TAKE 1 TABLET BY MOUTH FOUR TIMES DAILY 12/20/21   Jourdan Christensen MD   amLODIPine (NORVASC) 10 mg tablet Take 1 Tablet by mouth daily.  12/20/21   Jourdan Christensen MD   famotidine (PEPCID) 20 mg tablet TAKE 1 TABLET BY MOUTH TWICE DAILY 11/30/21   Jourdan Christensen MD   gabapentin (Neurontin) 100 mg capsule Take 2 tablets by mouth three times daily 11/23/21   Mg Avelar MD   amiodarone (CORDARONE) 200 mg tablet TAKE 1 TABLET BY MOUTH EVERY DAY 9/18/21   Carlee Zambrano MD   valGANciclovir (VALCYTE) 450 mg tablet TAKE 2 TABLETS BY MOUTH DAILY 9/18/21   Carlee Zambrano MD   apixaban (Eliquis DVT-PE Treat 30D Start) 5 mg (74 tabs) starter pack Take 5 mg by mouth twice a day for 7 days Followed by 2.5 mg by mouth twice a day 9/14/21   Isabela Dejesus MD   levothyroxine (SYNTHROID) 50 mcg tablet Take 1 Tablet by mouth daily. 8/24/21   Carlee Zambrano MD   carvediloL (COREG) 6.25 mg tablet Take 1 Tablet by mouth two (2) times daily (with meals). 8/24/21   Carlee Zambrano MD   dexlansoprazole (Dexilant) 60 mg CpDB capsule (delayed release) Take 1 Capsule by mouth daily. 8/24/21   Carlee Zambrano MD   hyoscyamine SL (LEVSIN/SL) 0.125 mg SL tablet 1 Tablet by SubLINGual route every four (4) hours as needed (irritable bowel). 8/24/21   Carlee Zambrano MD   losartan (COZAAR) 50 mg tablet Take 1 Tablet by mouth daily. 8/24/21   Carlee Zambrano MD   calcitRIOL (ROCALTROL) 0.25 mcg capsule Take 1 Capsule by mouth as directed. Take on non dialysis days 7/19/21   Abelardo Yepez MD   doxercalciferol (HECTOROL IV) 14 mcg by IntraVENous route two (2) days a week. 3/12/21 3/11/22  Abelardo Yepez MD   lidocaine-prilocaine (EMLA) topical cream as directed. 7/23/21   Marleni, MD Courtney Zhou 250-50 mcg/dose diskus inhaler INHALE 1 PUFF BY MOUTH EVERY 12 HOURS 7/3/21   Carlee Zambrano MD   RenaPlex-D 800 mcg-12.5 mg -2,000 unit tab Take 1 Tablet by mouth daily. 4/15/21   Abelardo Yepez MD   sevelamer carbonate (RENVELA) 800 mg tab tab Take 800 mg by mouth three (3) times daily. 6/8/21   Abelardo Yepez MD   zolpidem (AMBIEN) 5 mg tablet Take 5 mg by mouth nightly as needed.  4/5/21   Other, MD Abelardo   dicyclomine (BENTYL) 10 mg capsule TAKE 1 CAPSULE BY MOUTH FOUR TIMES DAILY 4/15/21   Carlee Zambrano MD   simvastatin (ZOCOR) 20 mg tablet TAKE 1 TABLET BY MOUTH DAILY AS DIRECTED. 2/19/21   Carlee Zambrano MD   aluminum & magnesium hydroxide-simethicone (Maalox Maximum Strength) 400-400-40 mg/5 mL suspension Take 10 mL by mouth every six (6) hours as needed for Indigestion. 9/17/20   Bacilio Tee MD   ESTRACE 0.01 % (0.1 mg/gram) vaginal cream INSERT 2 GRAMS INTO VAGINA EVERY MONDAY AND THURSDAY 4/11/17   Vielka Trinidad MD   cranberry extract 425 mg cap 425 mg. 1/17/14   Provider, Historical   LORazepam (ATIVAN) 0.5 mg tablet Take 0.5 mg by mouth daily. Provider, Historical   fluticasone (FLONASE) 50 mcg/actuation nasal spray 1 Spray. Provider, Historical   EPINEPHrine (EPIPEN) 0.3 mg/0.3 mL injection 0.3 mg. 10/1/16   Provider, Historical      Allergies   Allergen Reactions    Aspirin Rash and Unknown (comments)    Bactrim [Sulfamethoxazole-Trimethoprim] Rash and Unknown (comments)    Bromfenac Rash and Unknown (comments)    Cefepime Other (comments)     Neurological reaction    Ceftriaxone Rash    Copper Rash    Ibuprofen Rash and Unknown (comments)    Ketorolac Tromethamine Rash and Unknown (comments)    Morphine Rash and Unknown (comments)    Relafen [Nabumetone] Rash and Unknown (comments)    Rifampin Rash and Unknown (comments)    Vancomycin Rash and Unknown (comments)     Pt reports causes itching, takes benadryl prior to use. Pt denies anaphylaxis. Requires benadryl with each vancomycin dose     Social History     Tobacco Use    Smoking status: Never Smoker    Smokeless tobacco: Never Used   Vaping Use    Vaping Use: Never used   Substance Use Topics    Alcohol use: No    Drug use: No        Patient Active Problem List   Diagnosis Code    History of kidney transplant Z94.0    CMV (cytomegalovirus) antibody positive R76.8    Hematuria R31.9    Fecal incontinence R15.9    Drug-induced hyperglycemia R73.9, T50.905A    Diverticulitis of intestine K57.92    Anemia D64.9    Acute renal failure (HCC) N17.9    Asthma J45.909    Exacerbation of asthma J45. 0    Chronic kidney disease, stage III (moderate) ([facility]) N18.30    Chronic obstructive pulmonary disease ([facility]) J44.9    Cystic disease of kidney Q61.9    Diverticular disease of large intestine K57.30    Essential hypertension I10    Fever R50.9    Gastritis and duodenitis K29.90    Seasonal allergic rhinitis due to pollen J30.1    Hyperlipidemia E78.5    Hyperkalemia E87.5    Disease due to gram-negative bacillus B96.89    Kidney transplant rejection T86.11    Migraine without status migrainosus, not intractable G43.909    Nausea and vomiting R11.2    Pruritus L29.9    Recurrent urinary tract infection N39.0    UTI (urinary tract infection) N39.0    Renal transplant disorder T86.10    Systemic infection ([facility]) A41.9    Systemic inflammatory response syndrome (SIRS) ([facility]) R65.10    Ulcer [id number]    Renal cyst N28.1    Obesity E66.9    Migraine G43.909    Hypertension I10    Hypercholesteremia E78.00    GERD (gastroesophageal reflux disease) K21.9    Burning with urination R30.0    Arrhythmia I49.9    Acute recurrent maxillary sinusitis J01.01    Calculus of gallbladder with acute cholecystitis without obstruction K80.00    Hypothyroidism E03.9    Vertigo R42    ESRD (end stage renal disease) on dialysis ([facility]) N18.6, Z99.2    History of DVT (deep vein thrombosis) Z86.718    Stomatitis K12.1    Thrush of mouth and esophagus ([facility]) B37.81, B37.0    Encounter for cholecystectomy Z76.89    Acute left-sided low back pain without sciatica M54.50    Atrial fibrillation ([facility]) I48.91    Chronic anticoagulation Z79.01    Acute hyperkalemia E87.5    Left leg pain M79.605    Intractable vomiting R11.10    Atypical chest pain R07.89    ESRD needing dialysis ([facility]) N18.6, Z99.2    Mild persistent asthma with (acute) exacerbation J45.31       Review of Systems   Respiratory: Positive for shortness of breath. All other systems reviewed and are negative. Objective: There were no vitals taken for this visit. General: alert, cooperative, no distress   Mental  status: normal mood, behavior, speech, thought processes, able to follow commands   Psychiatric: normal affect, consistent with stated mood, no evidence of hallucinations       Assessment & Plan:     1. Hospital discharge follow-up  Continues to be short of breath    2. Mild persistent asthma with (acute) exacerbation  Start prednisone for 5 days. Continue to monitor symptoms and go to ER if no improvement. Will follow up in 1 week to adjust asthma medications          Time spent on encounter: 15 minutes  712      We discussed the expected course, resolution and complications of the diagnosis(es) in detail. Medication risks, benefits, costs, interactions, and alternatives were discussed as indicated. I advised her to contact the office if her condition worsens, changes or fails to improve as anticipated. She expressed understanding with the diagnosis(es) and plan. Markel Cuenca is a 61 y.o. female who was evaluated by an audio visit encounter for concerns as above. Patient identification was verified prior to start of the visit. A caregiver was present when appropriate. Due to this being a TeleHealth encounter (During WPOOX-84 public health emergency), evaluation of the following organ systems was limited: Vitals/Constitutional/EENT/Resp/CV/GI//MS/Neuro/Skin/Heme-Lymph-Imm. Pursuant to the emergency declaration under the [facility], [facility] waiver authority and the [employer] Act, this Virtual  Visit was conducted, with patient's (and/or legal guardian's) consent, to reduce the patient's risk of exposure to COVID-19 and provide necessary medical care. Markel Cuenca is a 61 y.o. female who was seen by synchronous (real-time) audio technology on 1/10/2022.       Consent: Markel Cuenca, who was seen by synchronous (real-time) audio technology, and/or her healthcare decision maker, is aware that this patient-initiated, Telehealth encounter on 1/10/2022 is a billable service, with coverage as determined by her insurance carrier. She is aware that she may receive a bill and has provided verbal consent to proceed: Yes. Ability to conduct physical exam was limited. I was in the office. The patient was at home.

## 2022-01-12 NOTE — TELEPHONE ENCOUNTER
Spoke to patient today and reminded her that per  [facility] she was not to restart the blood thinner Eliquis.  She verbalized her understanding

## 2022-01-13 RX ORDER — DEXLANSOPRAZOLE 60 MG/1
CAPSULE, DELAYED RELEASE ORAL
Qty: 30 CAPSULE | Refills: 5 | Status: SHIPPED | OUTPATIENT
Start: 2022-01-13 | End: 2022-07-27 | Stop reason: SDUPTHER

## 2022-01-17 DIAGNOSIS — K21.00 GASTROESOPHAGEAL REFLUX DISEASE WITH ESOPHAGITIS WITHOUT HEMORRHAGE: ICD-10-CM

## 2022-01-17 RX ORDER — HYOSCYAMINE SULFATE 0.12 MG/1
0.12 TABLET SUBLINGUAL
Qty: 30 TABLET | Refills: 5 | Status: SHIPPED | OUTPATIENT
Start: 2022-01-17 | End: 2022-07-27 | Stop reason: SDUPTHER

## 2022-01-17 NOTE — TELEPHONE ENCOUNTER
Requested Prescriptions     Pending Prescriptions Disp Refills    hyoscyamine SL (LEVSIN/SL) 0.125 mg SL tablet 30 Tablet 5     Si Tablet by SubLINGual route every four (4) hours as needed (irritable bowel).

## 2022-01-19 ENCOUNTER — TELEPHONE (OUTPATIENT)
Dept: FAMILY MEDICINE CLINIC | Age: 67
End: 2022-01-19

## 2022-01-19 NOTE — TELEPHONE ENCOUNTER
----- Message from WILNER SILVA sent at 1/17/2022  2:31 PM EST -----  Subject: Message to Provider    QUESTIONS  Information for Provider? Patient has [employer] Medicare but Medicare keeps   telling her Dr. Robin Monroy is out of network. Please give her a call back to   confirm if he takes her insurance or not.  ---------------------------------------------------------------------------  --------------  [address]  What is the best way for the office to contact you? OK to leave message on   voicemail  Preferred Call Back Phone Number? [phone]  ---------------------------------------------------------------------------  --------------  SCRIPT ANSWERS  Relationship to Patient?  Self

## 2022-01-20 ENCOUNTER — OFFICE VISIT (OUTPATIENT)
Dept: FAMILY MEDICINE CLINIC | Age: 67
End: 2022-01-20
Payer: MEDICARE

## 2022-01-20 ENCOUNTER — HOSPITAL ENCOUNTER (OUTPATIENT)
Dept: LAB | Age: 67
Discharge: HOME | End: 2022-01-20
Payer: MEDICARE

## 2022-01-20 VITALS
OXYGEN SATURATION: 100 % | HEART RATE: 74 BPM | SYSTOLIC BLOOD PRESSURE: 145 MMHG | WEIGHT: 282 LBS | BODY MASS INDEX: 53.28 KG/M2 | DIASTOLIC BLOOD PRESSURE: 75 MMHG

## 2022-01-20 DIAGNOSIS — E66.01 MORBID OBESITY WITH BMI OF 50.0-59.9, ADULT (HCC): ICD-10-CM

## 2022-01-20 DIAGNOSIS — I48.91 ATRIAL FIBRILLATION, UNSPECIFIED TYPE (HCC): ICD-10-CM

## 2022-01-20 DIAGNOSIS — I82.411 ACUTE DEEP VEIN THROMBOSIS (DVT) OF FEMORAL VEIN OF RIGHT LOWER EXTREMITY (HCC): ICD-10-CM

## 2022-01-20 DIAGNOSIS — I50.32 CHRONIC HEART FAILURE WITH PRESERVED EJECTION FRACTION (HCC): ICD-10-CM

## 2022-01-20 DIAGNOSIS — E03.9 HYPOTHYROIDISM, UNSPECIFIED TYPE: ICD-10-CM

## 2022-01-20 DIAGNOSIS — Z00.00 MEDICARE ANNUAL WELLNESS VISIT, SUBSEQUENT: ICD-10-CM

## 2022-01-20 DIAGNOSIS — J45.40 MODERATE PERSISTENT ASTHMA, UNSPECIFIED WHETHER COMPLICATED: Primary | ICD-10-CM

## 2022-01-20 DIAGNOSIS — Z12.31 ENCOUNTER FOR SCREENING MAMMOGRAM FOR MALIGNANT NEOPLASM OF BREAST: ICD-10-CM

## 2022-01-20 DIAGNOSIS — F33.1 MODERATE EPISODE OF RECURRENT MAJOR DEPRESSIVE DISORDER (HCC): ICD-10-CM

## 2022-01-20 DIAGNOSIS — N18.6 ESRD NEEDING DIALYSIS (HCC): ICD-10-CM

## 2022-01-20 DIAGNOSIS — Z99.2 ESRD NEEDING DIALYSIS (HCC): ICD-10-CM

## 2022-01-20 DIAGNOSIS — Z12.11 COLON CANCER SCREENING: ICD-10-CM

## 2022-01-20 LAB
ALBUMIN SERPL-MCNC: 4.2 G/DL (ref 3.5–4.7)
ALBUMIN/GLOB SERPL: 1.6 {RATIO}
ALP SERPL-CCNC: 77 U/L (ref 38–126)
ALT SERPL-CCNC: 30 U/L (ref 3–52)
ANION GAP SERPL CALC-SCNC: 15 MMOL/L
AST SERPL W P-5'-P-CCNC: 37 U/L (ref 14–74)
BASOPHILS # BLD: 0 K/UL (ref 0–0.1)
BASOPHILS NFR BLD: 0 % (ref 0–2)
BILIRUB SERPL-MCNC: 0.4 MG/DL (ref 0.2–1)
BUN SERPL-MCNC: 97 MG/DL (ref 9–21)
BUN/CREAT SERPL: 12
CA-I BLD-MCNC: 7.8 MG/DL (ref 8.5–10.5)
CHLORIDE SERPL-SCNC: 98 MMOL/L (ref 94–111)
CO2 SERPL-SCNC: 23 MMOL/L (ref 21–33)
CREAT SERPL-MCNC: 8 MG/DL (ref 0.7–1.2)
DIFFERENTIAL METHOD BLD: ABNORMAL
EOSINOPHIL # BLD: 0 K/UL (ref 0–0.4)
EOSINOPHIL NFR BLD: 0 % (ref 0–5)
ERYTHROCYTE [DISTWIDTH] IN BLOOD BY AUTOMATED COUNT: 17.4 % (ref 11.6–14.5)
GLOBULIN SER CALC-MCNC: 2.6 G/DL
GLUCOSE SERPL-MCNC: 98 MG/DL (ref 70–110)
HCT VFR BLD AUTO: 31 % (ref 35–45)
HGB BLD-MCNC: 9.5 G/DL (ref 12–16)
IMM GRANULOCYTES # BLD AUTO: 0.2 K/UL (ref 0–0.04)
IMM GRANULOCYTES NFR BLD AUTO: 2 % (ref 0–0.5)
LYMPHOCYTES # BLD: 1.2 K/UL (ref 0.9–3.6)
LYMPHOCYTES NFR BLD: 12 % (ref 21–52)
MAGNESIUM SERPL-MCNC: 2.4 MG/DL (ref 1.7–2.8)
MCH RBC QN AUTO: 35.4 PG (ref 24–34)
MCHC RBC AUTO-ENTMCNC: 30.6 G/DL (ref 31–37)
MCV RBC AUTO: 115.7 FL (ref 78–100)
MONOCYTES # BLD: 0.4 K/UL (ref 0.05–1.2)
MONOCYTES NFR BLD: 4 % (ref 3–10)
NEUTS SEG # BLD: 8.4 K/UL (ref 1.8–8)
NEUTS SEG NFR BLD: 82 % (ref 40–73)
NRBC # BLD: 0 K/UL (ref 0–0.01)
NRBC BLD-RTO: 0 PER 100 WBC
PHOSPHATE SERPL-MCNC: 5.1 MG/DL (ref 2.5–4.5)
PLATELET # BLD AUTO: 130 K/UL (ref 135–420)
PMV BLD AUTO: 9.9 FL (ref 9.2–11.8)
POTASSIUM SERPL-SCNC: 4.9 MMOL/L (ref 3.2–5.1)
PROT SERPL-MCNC: 6.8 G/DL (ref 6.1–8.4)
RBC # BLD AUTO: 2.68 M/UL (ref 4.2–5.3)
RBC MORPH BLD: ABNORMAL
SODIUM SERPL-SCNC: 136 MMOL/L (ref 135–145)
TSH SERPL DL<=0.05 MIU/L-ACNC: 4.5 UIU/ML (ref 0.35–6.2)
WBC # BLD AUTO: 10.2 K/UL (ref 4.6–13.2)

## 2022-01-20 PROCEDURE — 80053 COMPREHEN METABOLIC PANEL: CPT

## 2022-01-20 PROCEDURE — 36415 COLL VENOUS BLD VENIPUNCTURE: CPT

## 2022-01-20 PROCEDURE — 82043 UR ALBUMIN QUANTITATIVE: CPT

## 2022-01-20 PROCEDURE — 83036 HEMOGLOBIN GLYCOSYLATED A1C: CPT

## 2022-01-20 PROCEDURE — 85025 COMPLETE CBC W/AUTO DIFF WBC: CPT

## 2022-01-20 PROCEDURE — G8510 SCR DEP NEG, NO PLAN REQD: HCPCS | Performed by: STUDENT IN AN ORGANIZED HEALTH CARE EDUCATION/TRAINING PROGRAM

## 2022-01-20 PROCEDURE — 99214 OFFICE O/P EST MOD 30 MIN: CPT | Performed by: STUDENT IN AN ORGANIZED HEALTH CARE EDUCATION/TRAINING PROGRAM

## 2022-01-20 PROCEDURE — 84443 ASSAY THYROID STIM HORMONE: CPT

## 2022-01-20 PROCEDURE — G9231 DOC ESRD DIA TRANS PREG: HCPCS | Performed by: STUDENT IN AN ORGANIZED HEALTH CARE EDUCATION/TRAINING PROGRAM

## 2022-01-20 PROCEDURE — G0439 PPPS, SUBSEQ VISIT: HCPCS | Performed by: STUDENT IN AN ORGANIZED HEALTH CARE EDUCATION/TRAINING PROGRAM

## 2022-01-20 PROCEDURE — G8417 CALC BMI ABV UP PARAM F/U: HCPCS | Performed by: STUDENT IN AN ORGANIZED HEALTH CARE EDUCATION/TRAINING PROGRAM

## 2022-01-20 PROCEDURE — G9899 SCRN MAM PERF RSLTS DOC: HCPCS | Performed by: STUDENT IN AN ORGANIZED HEALTH CARE EDUCATION/TRAINING PROGRAM

## 2022-01-20 PROCEDURE — G8428 CUR MEDS NOT DOCUMENT: HCPCS | Performed by: STUDENT IN AN ORGANIZED HEALTH CARE EDUCATION/TRAINING PROGRAM

## 2022-01-20 PROCEDURE — 83735 ASSAY OF MAGNESIUM: CPT

## 2022-01-20 PROCEDURE — 3017F COLORECTAL CA SCREEN DOC REV: CPT | Performed by: STUDENT IN AN ORGANIZED HEALTH CARE EDUCATION/TRAINING PROGRAM

## 2022-01-20 PROCEDURE — 84100 ASSAY OF PHOSPHORUS: CPT

## 2022-01-20 PROCEDURE — 1111F DSCHRG MED/CURRENT MED MERGE: CPT | Performed by: STUDENT IN AN ORGANIZED HEALTH CARE EDUCATION/TRAINING PROGRAM

## 2022-01-20 PROCEDURE — 80061 LIPID PANEL: CPT

## 2022-01-20 RX ORDER — FLUTICASONE FUROATE, UMECLIDINIUM BROMIDE AND VILANTEROL TRIFENATATE 100; 62.5; 25 UG/1; UG/1; UG/1
1 POWDER RESPIRATORY (INHALATION) DAILY
Qty: 60 EACH | Refills: 5 | Status: SHIPPED | OUTPATIENT
Start: 2022-01-20 | End: 2022-07-27 | Stop reason: SDUPTHER

## 2022-01-20 RX ORDER — ESCITALOPRAM OXALATE 10 MG/1
10 TABLET ORAL DAILY
Qty: 90 TABLET | Refills: 0 | Status: SHIPPED | OUTPATIENT
Start: 2022-01-20 | End: 2022-04-08

## 2022-01-20 NOTE — PROGRESS NOTES
Gilles Thomas presents today for   Chief Complaint   Patient presents with    Follow-up     1 week follow up        Is someone accompanying this pt? no    Is the patient using any DME equipment during [address]? no    Depression Screening:  3 most recent PHQ Screens 1/20/2022   Little interest or pleasure in doing things Several days   Feeling down, depressed, irritable, or hopeless Several days   Total Score PHQ 2 2       Learning Assessment:  Learning Assessment 8/17/2021   PRIMARY LEARNER Patient   HIGHEST LEVEL OF EDUCATION - PRIMARY LEARNER  SOME COLLEGE   BARRIERS PRIMARY LEARNER NONE   PRIMARY LANGUAGE ENGLISH   LEARNER PREFERENCE PRIMARY DEMONSTRATION   LEARNING SPECIAL TOPICS NO   ANSWERED BY SELF   RELATIONSHIP SELF       Fall Risk  Fall Risk Assessment, last 12 mths 12/24/2020   Able to walk? Yes   Fall in past 12 months? 0   Do you feel unsteady? 0   Are you worried about falling 0   Number of falls in past 12 months -   Fall with injury? -       ADL  ADL Assessment 12/24/2020   Feeding yourself No Help Needed   Getting from bed to chair No Help Needed   Getting dressed No Help Needed   Bathing or showering No Help Needed   Walk across the room (includes cane/walker) No Help Needed   Using the telphone No Help Needed   Taking your medications No Help Needed   Preparing meals No Help Needed   Managing money (expenses/bills) No Help Needed   Moderately strenuous housework (laundry) No Help Needed   Shopping for personal items (toiletries/medicines) No Help Needed   Shopping for groceries No Help Needed   Driving No Help Needed   Climbing a flight of stairs No Help Needed   Getting to places beyond walking distances No Help Needed       Travel Screening:    Travel Screening     Question   Response    In the last month, have you been in contact with someone who was confirmed or suspected to have Coronavirus / COVID-19? No / Unsure    Have you had a COVID-19 viral test in the last 14 days?   No    Do you have any of the following new or worsening symptoms? None of these    Have you traveled internationally or domestically in the last month? No      Travel History   Travel since 12/20/21    No documented travel since 12/20/21         Health Maintenance reviewed and discussed and ordered per Provider. Health Maintenance Due   Topic Date Due    Hepatitis C Screening  Never done    DTaP/Tdap/Td series (1 - Tdap) Never done    Cervical cancer screen  Never done    Shingrix Vaccine Age 50> (1 of 2) Never done    Pneumococcal 0-64 years (3 of 4 - PCV13) 11/13/2017    Medicare Yearly Exam  Never done    Lipid Screen  09/23/2021    Breast Cancer Screen Mammogram  10/01/2021    COVID-19 Vaccine (3 - Pfizer risk 4-dose series) 10/14/2021   . Coordination of Care:  1. \"Have you been to the ER, urgent care clinic since your last visit? Hospitalized since your last visit? \" No    2. \"Have you seen or consulted any other health care providers outside of the [address] [place] [address] since your last visit? \" No     3. For patients aged 39-70: Has the patient had a colonoscopy? Yes - no Care Gap present     If the patient is female:    4. For patients aged 41-77: Has the patient had a mammogram within the past 2 years? Yes - no Care Gap present    5. For patients aged 21-65: Has the patient had a pap smear?  Yes - no Care Gap present

## 2022-01-21 LAB
CHOLEST SERPL-MCNC: 129 MG/DL
CREAT UR-MCNC: 69 MG/DL (ref 30–125)
EST. AVERAGE GLUCOSE BLD GHB EST-MCNC: 94 MG/DL
HBA1C MFR BLD: 4.9 % (ref 4.2–5.6)
HDLC SERPL-MCNC: 76 MG/DL (ref 40–60)
HDLC SERPL: 1.7 {RATIO} (ref 0–5)
LDLC SERPL CALC-MCNC: 38.2 MG/DL (ref 0–100)
LIPID PROFILE,FLP: ABNORMAL
MICROALBUMIN UR-MCNC: 87 MG/DL (ref 0–3)
MICROALBUMIN/CREAT UR-RTO: 1261 MGMALB/GCRE (ref 0–30)
TRIGL SERPL-MCNC: 74 MG/DL (ref ?–150)
VLDLC SERPL CALC-MCNC: 14.8 MG/DL

## 2022-01-21 RX ORDER — MONTELUKAST SODIUM 10 MG/1
10 TABLET ORAL DAILY
Qty: 90 TABLET | Refills: 0 | Status: SHIPPED | OUTPATIENT
Start: 2022-01-21 | End: 2022-04-28

## 2022-01-24 NOTE — PROGRESS NOTES
Subjective:   Mona Baxter is a 61 y.o. female who was seen for Follow-up (1 week follow up )    Here for wellness visit and routine follow-up. Patient's asthma still not well controlled. She has had multiple asthma exacerbations. Has been compliant with her inhalers. Denies any chest pain. Heart rate has been controlled at home. Patient also has been more depressed and like to be started on medications. Currently not taking any medications for depression. Home Medications    Medication Sig Start Date End Date Taking? Authorizing Provider   montelukast (SINGULAIR) 10 mg tablet Take 1 Tablet by mouth daily. 1/21/22  Yes Amando Washburn MD   escitalopram oxalate (LEXAPRO) 10 mg tablet Take 1 Tablet by mouth daily. 1/20/22  Yes Amando Washburn MD   fluticasone-umeclidinium-vilanterol (Trelegy Ellipta) 100-62.5-25 mcg inhaler Take 1 Puff by inhalation daily. 1/20/22  Yes Amando Washburn MD   hyoscyamine SL (LEVSIN/SL) 0.125 mg SL tablet 1 Tablet by SubLINGual route every four (4) hours as needed (irritable bowel). 1/17/22  Yes Amando Washburn MD   Dexilant 60 mg CpDB capsule (delayed release) TAKE 1 CAPSULE BY MOUTH EVERY DAY 1/13/22  Yes Amando Washburn MD   meclizine (ANTIVERT) 25 mg tablet TAKE 1 TABLET BY MOUTH THREE TIMES DAILY FOR UP TO 10 DAYS AS NEEDED FOR DIZZINESS 1/10/22  Yes Amando Washburn MD   albuterol (PROVENTIL VENTOLIN) 2.5 mg /3 mL (0.083 %) nebu USE 1 VIAL VIA NEBULIZER THREE TIMES DAILY 12/23/21  Yes Amando Washburn MD   gabapentin (NEURONTIN) 100 mg capsule 2 capsules TID 12/22/21  Yes Lynette Tyler MD   sucralfate (CARAFATE) 1 gram tablet TAKE 1 TABLET BY MOUTH FOUR TIMES DAILY 12/20/21  Yes Amando Washburn MD   amLODIPine (NORVASC) 10 mg tablet Take 1 Tablet by mouth daily.  12/20/21  Yes Amando Washburn MD   famotidine (PEPCID) 20 mg tablet TAKE 1 TABLET BY MOUTH TWICE DAILY 11/30/21  Yes Amando Washburn MD   amiodarone (CORDARONE) 200 mg tablet TAKE 1 TABLET BY MOUTH EVERY DAY 9/18/21  Yes Anai Hernandez MD   valGANciclovir (VALCYTE) 450 mg tablet TAKE 2 TABLETS BY MOUTH DAILY 9/18/21  Yes Anai Hernandez MD   apixaban (Eliquis DVT-PE Treat 30D Start) 5 mg (74 tabs) starter pack Take 5 mg by mouth twice a day for 7 days Followed by 2.5 mg by mouth twice a day 9/14/21  Yes Valentine Goodwin MD   levothyroxine (SYNTHROID) 50 mcg tablet Take 1 Tablet by mouth daily. 8/24/21  Yes Anai Hernandez MD   carvediloL (COREG) 6.25 mg tablet Take 1 Tablet by mouth two (2) times daily (with meals). 8/24/21  Yes Anai Hernandez MD   losartan (COZAAR) 50 mg tablet Take 1 Tablet by mouth daily. 8/24/21  Yes Anai Hernandez MD   calcitRIOL (ROCALTROL) 0.25 mcg capsule Take 1 Capsule by mouth as directed. Take on non dialysis days 7/19/21  Yes Other, MD Abelardo   doxercalciferol (HECTOROL IV) 14 mcg by IntraVENous route two (2) days a week. 3/12/21 3/11/22 Yes Other, MD Abelardo   lidocaine-prilocaine (EMLA) topical cream as directed. 7/23/21  Yes Other, MD Abelardo   RenaPlex-D 800 mcg-12.5 mg -2,000 unit tab Take 1 Tablet by mouth daily. 4/15/21  Yes Other, MD Abelardo   sevelamer carbonate (RENVELA) 800 mg tab tab Take 800 mg by mouth three (3) times daily. 6/8/21  Yes Marleni, MD Abelardo   zolpidem (AMBIEN) 5 mg tablet Take 5 mg by mouth nightly as needed. 4/5/21  Yes Other, MD Abelardo   dicyclomine (BENTYL) 10 mg capsule TAKE 1 CAPSULE BY MOUTH FOUR TIMES DAILY 4/15/21  Yes Anai Hernandez MD   simvastatin (ZOCOR) 20 mg tablet TAKE 1 TABLET BY MOUTH DAILY AS DIRECTED. 2/19/21  Yes Anai Hernandez MD   aluminum & magnesium hydroxide-simethicone (Maalox Maximum Strength) 400-400-40 mg/5 mL suspension Take 10 mL by mouth every six (6) hours as needed for Indigestion.  9/17/20  Yes Lukasz Lynch MD   ESTRACE 0.01 % (0.1 mg/gram) vaginal cream INSERT 2 GRAMS INTO VAGINA EVERY MONDAY AND THURSDAY 4/11/17  Yes Robert Deng MD   cranberry extract 425 mg cap 425 mg. 1/17/14  Yes Provider, Historical   LORazepam (ATIVAN) 0.5 mg tablet Take 0.5 mg by mouth daily. Yes Provider, Historical   fluticasone (FLONASE) 50 mcg/actuation nasal spray 1 Spray. Yes Provider, Historical   EPINEPHrine (EPIPEN) 0.3 mg/0.3 mL injection 0.3 mg. 10/1/16  Yes Provider, Historical      Allergies   Allergen Reactions    Aspirin Rash and Unknown (comments)    Bactrim [Sulfamethoxazole-Trimethoprim] Rash and Unknown (comments)    Bromfenac Rash and Unknown (comments)    Cefepime Other (comments)     Neurological reaction    Ceftriaxone Rash    Copper Rash    Ibuprofen Rash and Unknown (comments)    Ketorolac Tromethamine Rash and Unknown (comments)    Morphine Rash and Unknown (comments)    Relafen [Nabumetone] Rash and Unknown (comments)    Rifampin Rash and Unknown (comments)    Vancomycin Rash and Unknown (comments)     Pt reports causes itching, takes benadryl prior to use. Pt denies anaphylaxis. Requires benadryl with each vancomycin dose     Social History     Tobacco Use    Smoking status: Never Smoker    Smokeless tobacco: Never Used   Vaping Use    Vaping Use: Never used   Substance Use Topics    Alcohol use: No    Drug use: No            Review of Systems   Respiratory: Positive for wheezing. All other systems reviewed and are negative.          Objective:     Visit Vitals  BP (!) 145/75   Pulse 74   Wt 282 lb (127.9 kg)   SpO2 100%   BMI 53.28 kg/m²        General: alert, oriented, not in distress  Head: scalp normal, atraumatic  Eyes: pupils are equal and reactive, full and intact EOM's  Ears: patent ear canal, intact tympanic membrane  Nose: normal turbinates, no congestion or discharge  Lips/Mouth: moist lips and buccal mucosa, non-enlarged tonsils, pink throat  Neck: supple, no JVD, no lymphadenopathy, non-palpable thyroid  Chest/Lungs: clear breath sounds, no wheezing or crackles  Heart: normal rate, regular rhythm, no murmur  Abdomen: soft, non-distended, non-tender, normal bowel sounds, no organomegaly, no masses  Extremities: no focal deformities, no edema  Skin: no active skin lesions    Laboratory/Tests:  Hospital Outpatient Visit on 01/20/2022   Component Date Value Ref Range Status    WBC 01/20/2022 10.2  4.6 - 13.2 K/uL Final    RBC 01/20/2022 2.68* 4.20 - 5.30 M/uL Final    HGB 01/20/2022 9.5* 12.0 - 16.0 g/dL Final    HCT 01/20/2022 31.0* 35.0 - 45.0 % Final    MCV 01/20/2022 115.7* 78.0 - 100.0 FL Final    MCH 01/20/2022 35.4* 24.0 - 34.0 PG Final    MCHC 01/20/2022 30.6* 31.0 - 37.0 g/dL Final    RDW 01/20/2022 17.4* 11.6 - 14.5 % Final    PLATELET 48/63/8096 540* 135 - 420 K/uL Final    MPV 01/20/2022 9.9  9.2 - 11.8 FL Final    NRBC 01/20/2022 0.0  0.0  WBC Final    ABSOLUTE NRBC 01/20/2022 0.00  0.00 - 0.01 K/uL Final    NEUTROPHILS 01/20/2022 82* 40 - 73 % Final    LYMPHOCYTES 01/20/2022 12* 21 - 52 % Final    MONOCYTES 01/20/2022 4  3 - 10 % Final    EOSINOPHILS 01/20/2022 0  0 - 5 % Final    BASOPHILS 01/20/2022 0  0 - 2 % Final    IMMATURE GRANULOCYTES 01/20/2022 2* 0 - 0.5 % Final    ABS. NEUTROPHILS 01/20/2022 8.4* 1.8 - 8.0 K/UL Final    ABS. LYMPHOCYTES 01/20/2022 1.2  0.9 - 3.6 K/UL Final    ABS. MONOCYTES 01/20/2022 0.4  0.05 - 1.2 K/UL Final    ABS. EOSINOPHILS 01/20/2022 0.0  0.0 - 0.4 K/UL Final    ABS. BASOPHILS 01/20/2022 0.0  0.0 - 0.1 K/UL Final    ABS. IMM.  GRANS. 01/20/2022 0.2* 0.00 - 0.04 K/UL Final    DF 01/20/2022 Manual    Final    RBC COMMENTS 01/20/2022     Final                    Value:Macrocytosis  2+      Hemoglobin A1c 01/20/2022 4.9  4.2 - 5.6 % Final    Comment: (NOTE)  HbA1C Interpretive Ranges  <5.7              Normal  5.7 - 6.4         Consider Prediabetes  >6.5              Consider Diabetes      Est. average glucose 01/20/2022 94  mg/dL Final    Comment: (NOTE)  The eAG should be interpreted with patient characteristics in mind   since ethnicity, interindividual differences, red cell lifespan,   variation in rates of glycation, etc. may affect the validity of the   calculation.  LIPID PROFILE 01/20/2022      Final    Cholesterol, total 01/20/2022 129  <200 mg/dL Final    Triglyceride 01/20/2022 74  <150 mg/dL Final    Comment: The drugs N-acetylcysteine (NAC) and  Metamiszole have been found to cause falsely  low results in this chemical assay. Please  be sure to submit blood samples obtained  BEFORE administration of either of these  drugs to assure correct results.  HDL Cholesterol 01/20/2022 76* 40 - 60 mg/dL Final    LDL, calculated 01/20/2022 38.2  0 - 100 mg/dL Final    VLDL, calculated 01/20/2022 14.8  mg/dL Final    CHOL/HDL Ratio 01/20/2022 1.7  0 - 5.0   Final    Microalbumin,urine random 01/20/2022 87.00* 0 - 3.0 mg/dL Final    Creatinine, urine 01/20/2022 69.00  30 - 125 mg/dL Final    Microalbumin/Creat ratio (mg/g cre* 01/20/2022 1,261* 0 - 30 mgMALB/gCRE Final    Magnesium 01/20/2022 2.4  1.7 - 2.8 mg/dL Final    Sodium 01/20/2022 136  135 - 145 mmol/L Final    Potassium 01/20/2022 4.9  3.2 - 5.1 mmol/L Final    Chloride 01/20/2022 98  94 - 111 mmol/L Final    CO2 01/20/2022 23  21 - 33 mmol/L Final    Anion gap 01/20/2022 15  mmol/L Final    Glucose 01/20/2022 98  70 - 110 mg/dL Final    BUN 01/20/2022 97* 9 - 21 mg/dL Final    Creatinine 01/20/2022 8.00* 0.70 - 1.20 mg/dL Final    BUN/Creatinine ratio 01/20/2022 12    Final    GFR est AA 01/20/2022 6  ml/min/1.73m2 Final    GFR est non-AA 01/20/2022 5  ml/min/1.73m2 Final    Comment: Estimated GFR is calculated using the IDMS-traceable Modification of Diet in Renal Disease (MDRD) Study equation, reported for both  Americans (GFRAA) and non- Americans (GFRNA), and normalized to 1.73m2 body surface area. The physician must decide which value applies to the patient. The MDRD study equation should only be used in individuals age 25 or older.  It has not been validated for the following: pregnant women, patients with serious comorbid conditions, or on certain medications, or persons with extremes of body size, muscle mass, or nutritional status.  Calcium 01/20/2022 7.8* 8.5 - 10.5 mg/dL Final    Bilirubin, total 01/20/2022 0.4  0.2 - 1.0 mg/dL Final    AST (SGOT) 01/20/2022 37  14 - 74 U/L Final    ALT (SGPT) 01/20/2022 30  3 - 52 U/L Final    Alk. phosphatase 01/20/2022 77  38 - 126 U/L Final    Protein, total 01/20/2022 6.8  6.1 - 8.4 g/dL Final    Albumin 01/20/2022 4.2  3.5 - 4.7 g/dL Final    Globulin 01/20/2022 2.6  g/dL Final    A-G Ratio 01/20/2022 1.6    Final    Phosphorus 01/20/2022 5.1* 2.5 - 4.5 mg/dL Final    TSH 01/20/2022 4.50  0.35 - 6.20 uIU/mL Final    Comment:    Due to TSH heterogeneity, both structurally and degree of glycosylation, monoclonal antibodies used in the TSH assay may not accurately quantitate TSH. Therefore, this result should be correlated with clinical findings as well as with other assessments of thyroid function, e.g., free T4, free T3.       Admission on 12/24/2021, Discharged on 12/25/2021   Component Date Value Ref Range Status    Ventricular Rate 12/24/2021 67  BPM Final    Atrial Rate 12/24/2021 67  BPM Final    P-R Interval 12/24/2021 241  ms Final    QRS Duration 12/24/2021 109  ms Final    Q-T Interval 12/24/2021 447  ms Final    QTC Calculation (Bezet) 12/24/2021 472  ms Final    Calculated P Axis 12/24/2021 95  degrees Final    Calculated R Axis 12/24/2021 83  degrees Final    Calculated T Axis 12/24/2021 50  degrees Final    Diagnosis 12/24/2021    Final                    Value:Sinus rhythm  Prolonged CT interval  Borderline right axis deviation    Confirmed by JAVIER FOX ([id number]) on 12/26/2021 11:37:58 AM      WBC 12/24/2021 5.9  4.6 - 13.2 K/uL Final    RBC 12/24/2021 2.46* 4.20 - 5.30 M/uL Final    HGB 12/24/2021 9.1* 12.0 - 16.0 g/dL Final    HCT 12/24/2021 28.4* 35.0 - 45.0 % Final    MCV 12/24/2021 115.4* 78.0 - 100.0 FL Final    MCH 12/24/2021 37.0* 24.0 - 34.0 PG Final    MCHC 12/24/2021 32.0  31.0 - 37.0 g/dL Final    RDW 12/24/2021 15.9* 11.6 - 14.5 % Final    PLATELET 70/13/9427 767  135 - 420 K/uL Final    MPV 12/24/2021 10.1  9.2 - 11.8 FL Final    NRBC 12/24/2021 0.0  0.0  WBC Final    ABSOLUTE NRBC 12/24/2021 0.00  0.00 - 0.01 K/uL Final    NEUTROPHILS 12/24/2021 87* 40 - 73 % Final    LYMPHOCYTES 12/24/2021 11* 21 - 52 % Final    MONOCYTES 12/24/2021 1* 3 - 10 % Final    EOSINOPHILS 12/24/2021 0  0 - 5 % Final    BASOPHILS 12/24/2021 0  0 - 2 % Final    IMMATURE GRANULOCYTES 12/24/2021 1* 0 - 0.5 % Final    ABS. NEUTROPHILS 12/24/2021 5.1  1.8 - 8.0 K/UL Final    ABS. LYMPHOCYTES 12/24/2021 0.6* 0.9 - 3.6 K/UL Final    ABS. MONOCYTES 12/24/2021 0.1  0.05 - 1.2 K/UL Final    ABS. EOSINOPHILS 12/24/2021 0.0  0.0 - 0.4 K/UL Final    ABS. BASOPHILS 12/24/2021 0.0  0.0 - 0.1 K/UL Final    ABS. IMM. GRANS. 12/24/2021 0.1* 0.00 - 0.04 K/UL Final    DF 12/24/2021 Manual    Final    RBC COMMENTS 12/24/2021     Final                    Value:Macrocytosis  1+      RBC COMMENTS 12/24/2021     Final                    Value: Anisocytosis  1+      Sodium 12/24/2021 136  135 - 145 mmol/L Final    Potassium 12/24/2021 7.3* 3.2 - 5.1 mmol/L Final    CALLED TO AND READ BACK BY CRITICAL RESULTS CALLED TO ER TO DEANGELO BALBUENA BY [facility] @[id number]. RBV. SPECIMEN IS NOT HEMOLYSED.     Chloride 12/24/2021 100  94 - 111 mmol/L Final    CO2 12/24/2021 19* 21 - 33 mmol/L Final    Anion gap 12/24/2021 17  mmol/L Final    Glucose 12/24/2021 124* 70 - 110 mg/dL Final    BUN 12/24/2021 96* 9 - 21 mg/dL Final    Creatinine 12/24/2021 10.50* 0.70 - 1.20 mg/dL Final    BUN/Creatinine ratio 12/24/2021 9    Final    GFR est AA 12/24/2021 4  ml/min/1.73m2 Final    GFR est non-AA 12/24/2021 4  ml/min/1.73m2 Final    Comment: Estimated GFR is calculated using the IDMS-traceable Modification of Diet in Renal Disease (MDRD) Study equation, reported for both  Americans (GFRAA) and non- Americans (GFRNA), and normalized to 1.73m2 body surface area. The physician must decide which value applies to the patient. The MDRD study equation should only be used in individuals age 25 or older. It has not been validated for the following: pregnant women, patients with serious comorbid conditions, or on certain medications, or persons with extremes of body size, muscle mass, or nutritional status.  Calcium 12/24/2021 8.1* 8.5 - 10.5 mg/dL Final    Bilirubin, total 12/24/2021 0.5  0.2 - 1.0 mg/dL Final    AST (SGOT) 12/24/2021 30  14 - 74 U/L Final    ALT (SGPT) 12/24/2021 28  3 - 52 U/L Final    Alk.  phosphatase 12/24/2021 91  38 - 126 U/L Final    Protein, total 12/24/2021 6.8  6.1 - 8.4 g/dL Final    Albumin 12/24/2021 4.1  3.5 - 4.7 g/dL Final    Globulin 12/24/2021 2.7  g/dL Final    A-G Ratio 12/24/2021 1.5    Final    Troponin-I, Qt. 12/24/2021 0.03  0.02 - 0.05 ng/mL Final    CK 12/24/2021 178* 26 - 140 U/L Final    CK - MB 12/24/2021 3.8  ng/mL Final    Magnesium 12/24/2021 2.2  1.7 - 2.8 mg/dL Final    Troponin-I, Qt. 12/24/2021 0.03  0.02 - 0.05 ng/mL Final    Glucose (POC) 12/24/2021 128* 70 - 110 mg/dL Final    Performed by 12/24/2021 Rondall Champagne   Final    WBC 12/25/2021 6.2  4.6 - 13.2 K/uL Final    RBC 12/25/2021 2.23* 4.20 - 5.30 M/uL Final    HGB 12/25/2021 8.0* 12.0 - 16.0 g/dL Final    HCT 12/25/2021 25.1* 35.0 - 45.0 % Final    MCV 12/25/2021 112.6* 78.0 - 100.0 FL Final    MCH 12/25/2021 35.9* 24.0 - 34.0 PG Final    MCHC 12/25/2021 31.9  31.0 - 37.0 g/dL Final    RDW 12/25/2021 15.6* 11.6 - 14.5 % Final    PLATELET 41/00/2121 089  135 - 420 K/uL Final    MPV 12/25/2021 10.0  9.2 - 11.8 FL Final    NRBC 12/25/2021 0.0  0.0  WBC Final    ABSOLUTE NRBC 12/25/2021 0.00  0.00 - 0.01 K/uL Final    NEUTROPHILS 12/25/2021 76* 40 - 73 % Final    BAND NEUTROPHILS 12/25/2021 1  0 - 5 % Final    LYMPHOCYTES 12/25/2021 19* 21 - 52 % Final    MONOCYTES 12/25/2021 4  3 - 10 % Final    EOSINOPHILS 12/25/2021 0  0 - 5 % Final    BASOPHILS 12/25/2021 0  0 - 2 % Final    IMMATURE GRANULOCYTES 12/25/2021 0  % Final    ABS. NEUTROPHILS 12/25/2021 4.8  1.8 - 8.0 K/UL Final    ABS. LYMPHOCYTES 12/25/2021 1.2  0.9 - 3.6 K/UL Final    ABS. MONOCYTES 12/25/2021 0.2  0.05 - 1.2 K/UL Final    ABS. EOSINOPHILS 12/25/2021 0.0  0.0 - 0.4 K/UL Final    ABS. BASOPHILS 12/25/2021 0.0  0.0 - 0.1 K/UL Final    ABS. IMM. GRANS. 12/25/2021 0.0  K/UL Final    DF 12/25/2021 AUTOMATED    Final    Manual    RBC COMMENTS 12/25/2021     Final                    Value:Macrocytosis  1+      Magnesium 12/25/2021 2.0  1.7 - 2.8 mg/dL Final    Sodium 12/25/2021 138  135 - 145 mmol/L Final    Potassium 12/25/2021 4.2  3.2 - 5.1 mmol/L Final    Chloride 12/25/2021 99  94 - 111 mmol/L Final    CO2 12/25/2021 24  21 - 33 mmol/L Final    Anion gap 12/25/2021 15  mmol/L Final    Glucose 12/25/2021 114* 70 - 110 mg/dL Final    BUN 12/25/2021 52* 9 - 21 mg/dL Final    Creatinine 12/25/2021 6.10* 0.70 - 1.20 mg/dL Final    BUN/Creatinine ratio 12/25/2021 9    Final    GFR est AA 12/25/2021 8  ml/min/1.73m2 Final    GFR est non-AA 12/25/2021 7  ml/min/1.73m2 Final    Calcium 12/25/2021 8.1* 8.5 - 10.5 mg/dL Final    Phosphorus 12/25/2021 4.5  2.5 - 4.5 mg/dL Final    Hepatitis B surface Ag 12/25/2021 <0.10  <1.00 Index Final    Hep B surface Ag Interp.  12/25/2021 Negative  Negative Final   Admission on 09/14/2021, Discharged on 09/14/2021   Component Date Value Ref Range Status    Sodium 09/14/2021 138  135 - 145 mmol/L Final    Potassium 09/14/2021 5.2* 3.2 - 5.1 mmol/L Final    Chloride 09/14/2021 97  94 - 111 mmol/L Final    CO2 09/14/2021 29  21 - 33 mmol/L Final    Anion gap 09/14/2021 12  mmol/L Final    Glucose 09/14/2021 77  70 - 110 mg/dL Final    BUN 09/14/2021 36* 9 - 21 mg/dL Final    Creatinine 09/14/2021 6.70* 0.70 - 1.20 mg/dL Final  BUN/Creatinine ratio 09/14/2021 5    Final    GFR est AA 09/14/2021 8  ml/min/1.73m2 Final    GFR est non-AA 09/14/2021 6  ml/min/1.73m2 Final    Comment: Estimated GFR is calculated using the IDMS-traceable Modification of Diet in Renal Disease (MDRD) Study equation, reported for both  Americans (GFRAA) and non- Americans (GFRNA), and normalized to 1.73m2 body surface area. The physician must decide which value applies to the patient. The MDRD study equation should only be used in individuals age 25 or older. It has not been validated for the following: pregnant women, patients with serious comorbid conditions, or on certain medications, or persons with extremes of body size, muscle mass, or nutritional status.  Calcium 09/14/2021 9.0  8.5 - 10.5 mg/dL Final    Prothrombin time 09/14/2021 12.4  11.5 - 15.2 sec Final    INR 09/14/2021 1.0  0.8 - 1.2   Final   Hospital Outpatient Visit on 08/25/2021   Component Date Value Ref Range Status    Sodium 08/25/2021 137  135 - 145 mmol/L Final    Potassium 08/25/2021 4.2  3.2 - 5.1 mmol/L Final    Chloride 08/25/2021 96  94 - 111 mmol/L Final    CO2 08/25/2021 27  21 - 33 mmol/L Final    Anion gap 08/25/2021 14  mmol/L Final    Glucose 08/25/2021 100  70 - 110 mg/dL Final    BUN 08/25/2021 50* 9 - 21 mg/dL Final    Creatinine 08/25/2021 5.30* 0.70 - 1.20 mg/dL Final    BUN/Creatinine ratio 08/25/2021 9    Final    GFR est AA 08/25/2021 10  ml/min/1.73m2 Final    GFR est non-AA 08/25/2021 8  ml/min/1.73m2 Final    Comment: Estimated GFR is calculated using the IDMS-traceable Modification of Diet in Renal Disease (MDRD) Study equation, reported for both  Americans (GFRAA) and non- Americans (GFRNA), and normalized to 1.73m2 body surface area. The physician must decide which value applies to the patient. The MDRD study equation should only be used in individuals age 25 or older.  It has not been validated for the following: pregnant women, patients with serious comorbid conditions, or on certain medications, or persons with extremes of body size, muscle mass, or nutritional status.  Calcium 08/25/2021 9.0  8.5 - 10.5 mg/dL Final   Admission on 08/09/2021, Discharged on 08/13/2021   Component Date Value Ref Range Status    WBC 08/09/2021 5.2  4.6 - 13.2 K/uL Final    RBC 08/09/2021 2.83* 4.20 - 5.30 M/uL Final    HGB 08/09/2021 10.0* 12.0 - 16.0 g/dL Final    HCT 08/09/2021 31.2* 35.0 - 45.0 % Final    MCV 08/09/2021 110.2* 74.0 - 97.0 FL Final    MCH 08/09/2021 35.3* 24.0 - 34.0 PG Final    MCHC 08/09/2021 32.1  31.0 - 37.0 g/dL Final    RDW 08/09/2021 13.9  11.6 - 14.5 % Final    PLATELET 64/92/3887 768  135 - 420 K/uL Final    MPV 08/09/2021 8.8* 9.2 - 11.8 FL Final    NEUTROPHILS 08/09/2021 52  40 - 73 % Final    LYMPHOCYTES 08/09/2021 34  21 - 52 % Final    MONOCYTES 08/09/2021 12* 3 - 10 % Final    EOSINOPHILS 08/09/2021 1  0 - 5 % Final    BASOPHILS 08/09/2021 1  0 - 2 % Final    IMMATURE GRANULOCYTES 08/09/2021 0  % Final    ABS. NEUTROPHILS 08/09/2021 2.6  1.8 - 8.0 K/UL Final    ABS. LYMPHOCYTES 08/09/2021 1.8  0.9 - 3.6 K/UL Final    ABS. MONOCYTES 08/09/2021 0.6  0.05 - 1.2 K/UL Final    ABS. EOSINOPHILS 08/09/2021 0.1  0.0 - 0.4 K/UL Final    ABS. BASOPHILS 08/09/2021 0.1  0.0 - 0.1 K/UL Final    ABS. IMM.  GRANS. 08/09/2021 0.0  K/UL Final    PLATELET COMMENTS 62/59/0869 Adequate    Final    RBC COMMENTS 08/09/2021     Final                    Value:Macrocytosis  2+      RBC COMMENTS 08/09/2021     Final                    Value:Polychromasia  SLIGHT      Sodium 08/09/2021 134* 135 - 145 mmol/L Final    Potassium 08/09/2021 3.5  3.2 - 5.1 mmol/L Final    Chloride 08/09/2021 96  94 - 111 mmol/L Final    CO2 08/09/2021 25  21 - 33 mmol/L Final    Anion gap 08/09/2021 13  mmol/L Final    Glucose 08/09/2021 67* 70 - 110 mg/dL Final    BUN 08/09/2021 23* 9 - 21 mg/dL Final    Creatinine 08/09/2021 8.80* 0.70 - 1.20 mg/dL Final    BUN/Creatinine ratio 08/09/2021 3    Final    GFR est AA 08/09/2021 6  ml/min/1.73m2 Final    GFR est non-AA 08/09/2021 5  ml/min/1.73m2 Final    Comment: Estimated GFR is calculated using the IDMS-traceable Modification of Diet in Renal Disease (MDRD) Study equation, reported for both  Americans (GFRAA) and non- Americans (GFRNA), and normalized to 1.73m2 body surface area. The physician must decide which value applies to the patient. The MDRD study equation should only be used in individuals age 25 or older. It has not been validated for the following: pregnant women, patients with serious comorbid conditions, or on certain medications, or persons with extremes of body size, muscle mass, or nutritional status.  Calcium 08/09/2021 8.6  8.5 - 10.5 mg/dL Final    Bilirubin, total 08/09/2021 0.9  0.2 - 1.0 mg/dL Final    AST (SGOT) 08/09/2021 15  14 - 74 U/L Final    ALT (SGPT) 08/09/2021 8  3 - 52 U/L Final    Alk.  phosphatase 08/09/2021 76  38 - 126 U/L Final    Protein, total 08/09/2021 7.1  6.1 - 8.4 g/dL Final    Albumin 08/09/2021 3.5  3.5 - 4.7 g/dL Final    Globulin 08/09/2021 3.6  g/dL Final    A-G Ratio 08/09/2021 1.0    Final    Lipase 08/09/2021 32  10 - 57 U/L Final    Magnesium 08/09/2021 2.1  1.7 - 2.8 mg/dL Final    Troponin-I, Qt. 08/09/2021 <0.02* 0.02 - 0.05 ng/mL Final    Glucose (POC) 08/10/2021 84  70 - 110 mg/dL Final    Performed by 08/10/2021 Perfecto Maxwell   Final    WBC 08/10/2021 5.0  4.6 - 13.2 K/uL Final    RBC 08/10/2021 2.18* 4.20 - 5.30 M/uL Final    HGB 08/10/2021 7.5* 12.0 - 16.0 g/dL Final    CHANGE NOTED    HCT 08/10/2021 24.4* 35.0 - 45.0 % Final    MCV 08/10/2021 111.9* 74.0 - 97.0 FL Final    MCH 08/10/2021 34.4* 24.0 - 34.0 PG Final    MCHC 08/10/2021 30.7* 31.0 - 37.0 g/dL Final    RDW 08/10/2021 13.7  11.6 - 14.5 % Final    PLATELET 78/53/5852 023  135 - 420 K/uL Final    MPV 08/10/2021 9. 1* 9.2 - 11.8 FL Final    NEUTROPHILS 08/10/2021 59  40 - 73 % Final    LYMPHOCYTES 08/10/2021 25  21 - 52 % Final    MONOCYTES 08/10/2021 14* 3 - 10 % Final    EOSINOPHILS 08/10/2021 1  0 - 5 % Final    BASOPHILS 08/10/2021 1  0 - 2 % Final    IMMATURE GRANULOCYTES 08/10/2021 0  % Final    ABS. NEUTROPHILS 08/10/2021 3.0  1.8 - 8.0 K/UL Final    ABS. LYMPHOCYTES 08/10/2021 1.3  0.9 - 3.6 K/UL Final    ABS. MONOCYTES 08/10/2021 0.7  0.05 - 1.2 K/UL Final    ABS. EOSINOPHILS 08/10/2021 0.1  0.0 - 0.4 K/UL Final    ABS. BASOPHILS 08/10/2021 0.0  0.0 - 0.1 K/UL Final    ABS. IMM. GRANS. 08/10/2021 0.0  K/UL Final    Sodium 08/10/2021 136  135 - 145 mmol/L Final    Potassium 08/10/2021 4.1  3.2 - 5.1 mmol/L Final    Chloride 08/10/2021 99  94 - 111 mmol/L Final    CO2 08/10/2021 25  21 - 33 mmol/L Final    Anion gap 08/10/2021 12  mmol/L Final    Glucose 08/10/2021 93  70 - 110 mg/dL Final    BUN 08/10/2021 22* 9 - 21 mg/dL Final    Creatinine 08/10/2021 9.20* 0.70 - 1.20 mg/dL Final    BUN/Creatinine ratio 08/10/2021 2    Final    GFR est AA 08/10/2021 5  ml/min/1.73m2 Final    GFR est non-AA 08/10/2021 4  ml/min/1.73m2 Final    Calcium 08/10/2021 8.1* 8.5 - 10.5 mg/dL Final    Crossmatch Expiration 08/10/2021 08/13/2021,2359   Final    ABO/Rh(D) 08/10/2021 AB Positive   Final    Antibody screen 08/10/2021 Negative   Final    Unit number 08/10/2021 [id number]   Final    Blood component type 08/10/2021 RC LR   Final    Unit division 08/10/2021 00   Final    Status of unit 08/10/2021 Issued,final   Final    TRANSFUSION STATUS 08/10/2021 Ok to transfuse   Final    Crossmatch result 08/10/2021 Compatible   Final    WBC 08/11/2021 4.8  4.6 - 13.2 K/uL Final    RBC 08/11/2021 1.75* 4.20 - 5.30 M/uL Final    HGB 08/11/2021 6.0* 12.0 - 16.0 g/dL Final    CALLED TO AND READ BACK BY CRITICAL RESULTS CALLED TO 2S TO DEANGELO TOLEDO BY NICKO @0540.   RBV    HCT 08/11/2021 19.7* 35.0 - 45.0 % Final    CALLED TO AND READ BACK BY SEE COMMENT ABOVE. SJB    MCV 08/11/2021 112.6* 74.0 - 97.0 FL Final    MCH 08/11/2021 34.3* 24.0 - 34.0 PG Final    MCHC 08/11/2021 30.5* 31.0 - 37.0 g/dL Final    RDW 08/11/2021 13.6  11.6 - 14.5 % Final    PLATELET 84/45/2811 683  135 - 420 K/uL Final    MPV 08/11/2021 8.9* 9.2 - 11.8 FL Final    NEUTROPHILS 08/11/2021 57  40 - 73 % Final    LYMPHOCYTES 08/11/2021 31  21 - 52 % Final    MONOCYTES 08/11/2021 10  3 - 10 % Final    EOSINOPHILS 08/11/2021 1  0 - 5 % Final    BASOPHILS 08/11/2021 1  0 - 2 % Final    IMMATURE GRANULOCYTES 08/11/2021 0  % Final    ABS. NEUTROPHILS 08/11/2021 2.8  1.8 - 8.0 K/UL Final    ABS. LYMPHOCYTES 08/11/2021 1.5  0.9 - 3.6 K/UL Final    ABS. MONOCYTES 08/11/2021 0.5  0.05 - 1.2 K/UL Final    ABS. EOSINOPHILS 08/11/2021 0.0  0.0 - 0.4 K/UL Final    ABS. BASOPHILS 08/11/2021 0.0  0.0 - 0.1 K/UL Final    ABS. IMM. GRANS. 08/11/2021 0.0  K/UL Final    DF 08/11/2021 Manual    Final    RBC COMMENTS 08/11/2021     Final                    Value:Macrocytosis  2+      RBC COMMENTS 08/11/2021     Final                    Value: Anisocytosis  2+      Sodium 08/11/2021 135  135 - 145 mmol/L Final    Potassium 08/11/2021 4.1  3.2 - 5.1 mmol/L Final    Chloride 08/11/2021 105  94 - 111 mmol/L Final    CO2 08/11/2021 23  21 - 33 mmol/L Final    Anion gap 08/11/2021 7  mmol/L Final    Glucose 08/11/2021 99  70 - 110 mg/dL Final    BUN 08/11/2021 27* 9 - 21 mg/dL Final    Creatinine 08/11/2021 9.50* 0.70 - 1.20 mg/dL Final    BUN/Creatinine ratio 08/11/2021 3    Final    GFR est AA 08/11/2021 5  ml/min/1.73m2 Final    GFR est non-AA 08/11/2021 4  ml/min/1.73m2 Final    Calcium 08/11/2021 7.8* 8.5 - 10.5 mg/dL Final    Prothrombin time 08/11/2021 21.8* 11.5 - 15.2 sec Final    INR 08/11/2021 2.0* 0.8 - 1.2   Final    WBC 08/12/2021 6.0  4.6 - 13.2 K/uL Final    RBC 08/12/2021 2.46* 4.20 - 5.30 M/uL Final    HGB 08/12/2021 8.1* 12.0 - 16.0 g/dL Final    HCT 08/12/2021 26.1* 35.0 - 45.0 % Final    MCV 08/12/2021 106.1* 74.0 - 97.0 FL Final    MCH 08/12/2021 32.9  24.0 - 34.0 PG Final    MCHC 08/12/2021 31.0  31.0 - 37.0 g/dL Final    RDW 08/12/2021 19.1* 11.6 - 14.5 % Final    PLATELET 45/47/2609 282  135 - 420 K/uL Final    MPV 08/12/2021 8.8* 9.2 - 11.8 FL Final    NEUTROPHILS 08/12/2021 61  40 - 73 % Final    LYMPHOCYTES 08/12/2021 27  21 - 52 % Final    MONOCYTES 08/12/2021 9  3 - 10 % Final    EOSINOPHILS 08/12/2021 2  0 - 5 % Final    BASOPHILS 08/12/2021 1  0 - 2 % Final    IMMATURE GRANULOCYTES 08/12/2021 0  % Final    ABS. NEUTROPHILS 08/12/2021 3.7  1.8 - 8.0 K/UL Final    ABS. LYMPHOCYTES 08/12/2021 1.6  0.9 - 3.6 K/UL Final    ABS. MONOCYTES 08/12/2021 0.5  0.05 - 1.2 K/UL Final    ABS. EOSINOPHILS 08/12/2021 0.1  0.0 - 0.4 K/UL Final    ABS. BASOPHILS 08/12/2021 0.1  0.0 - 0.1 K/UL Final    ABS. IMM.  GRANS. 08/12/2021 0.0  K/UL Final    Sodium 08/12/2021 139  135 - 145 mmol/L Final    Potassium 08/12/2021 4.6  3.2 - 5.1 mmol/L Final    Chloride 08/12/2021 107  94 - 111 mmol/L Final    CO2 08/12/2021 22  21 - 33 mmol/L Final    Anion gap 08/12/2021 10  mmol/L Final    Glucose 08/12/2021 77  70 - 110 mg/dL Final    BUN 08/12/2021 29* 9 - 21 mg/dL Final    Creatinine 08/12/2021 10.40* 0.70 - 1.20 mg/dL Final    BUN/Creatinine ratio 08/12/2021 3    Final    GFR est AA 08/12/2021 5  ml/min/1.73m2 Final    GFR est non-AA 08/12/2021 4  ml/min/1.73m2 Final    Calcium 08/12/2021 8.7  8.5 - 10.5 mg/dL Final    Magnesium 08/12/2021 2.0  1.7 - 2.8 mg/dL Final    Phosphorus 08/12/2021 5.3* 2.5 - 4.5 mg/dL Final    Prothrombin time 08/12/2021 17.1* 11.5 - 15.2 sec Final    INR 08/12/2021 1.5* 0.8 - 1.2   Final    Prothrombin time 08/13/2021 15.3* 11.5 - 15.2 sec Final    INR 08/13/2021 1.3* 0.8 - 1.2   Final   Admission on 07/30/2021, Discharged on 07/30/2021   Component Date Value Ref Range Status  WBC 07/30/2021 6.9  4.6 - 13.2 K/uL Final    RBC 07/30/2021 2.17* 4.20 - 5.30 M/uL Final    HGB 07/30/2021 7.9* 12.0 - 16.0 g/dL Final    HCT 07/30/2021 24.2* 35.0 - 45.0 % Final    MCV 07/30/2021 111.5* 74.0 - 97.0 FL Final    MCH 07/30/2021 36.4* 24.0 - 34.0 PG Final    MCHC 07/30/2021 32.6  31.0 - 37.0 g/dL Final    RDW 07/30/2021 15.6* 11.6 - 14.5 % Final    PLATELET 77/89/3699 833  135 - 420 K/uL Final    MPV 07/30/2021 9.3  9.2 - 11.8 FL Final    NEUTROPHILS 07/30/2021 66  40 - 73 % Final    LYMPHOCYTES 07/30/2021 27  21 - 52 % Final    MONOCYTES 07/30/2021 5  3 - 10 % Final    EOSINOPHILS 07/30/2021 1  0 - 5 % Final    BASOPHILS 07/30/2021 1  0 - 2 % Final    IMMATURE GRANULOCYTES 07/30/2021 0  % Final    ABS. NEUTROPHILS 07/30/2021 4.5  1.8 - 8.0 K/UL Final    ABS. LYMPHOCYTES 07/30/2021 1.9  0.9 - 3.6 K/UL Final    ABS. MONOCYTES 07/30/2021 0.3  0.05 - 1.2 K/UL Final    ABS. EOSINOPHILS 07/30/2021 0.1  0.0 - 0.4 K/UL Final    ABS. BASOPHILS 07/30/2021 0.1  0.0 - 0.1 K/UL Final    ABS. IMM. GRANS. 07/30/2021 0.0  K/UL Final    DF 07/30/2021 Manual    Final    RBC COMMENTS 07/30/2021     Final                    Value:Macrocytosis  1+      RBC COMMENTS 07/30/2021     Final                    Value: Anisocytosis  1+      Sodium 07/30/2021 134* 135 - 145 mmol/L Final    Potassium 07/30/2021 3.8  3.2 - 5.1 mmol/L Final    Chloride 07/30/2021 95  94 - 111 mmol/L Final    CO2 07/30/2021 27  21 - 33 mmol/L Final    Anion gap 07/30/2021 12  mmol/L Final    Glucose 07/30/2021 62* 70 - 110 mg/dL Final    BUN 07/30/2021 28* 9 - 21 mg/dL Final    Creatinine 07/30/2021 7.70* 0.70 - 1.20 mg/dL Final    BUN/Creatinine ratio 07/30/2021 4    Final    GFR est AA 07/30/2021 6  ml/min/1.73m2 Final    GFR est non-AA 07/30/2021 5  ml/min/1.73m2 Final    Comment: Estimated GFR is calculated using the IDMS-traceable Modification of Diet in Renal Disease (MDRD) Study equation, reported for both  Americans (GFRAA) and non- Americans (GFRNA), and normalized to 1.73m2 body surface area. The physician must decide which value applies to the patient. The MDRD study equation should only be used in individuals age 25 or older. It has not been validated for the following: pregnant women, patients with serious comorbid conditions, or on certain medications, or persons with extremes of body size, muscle mass, or nutritional status.  Calcium 07/30/2021 8.4* 8.5 - 10.5 mg/dL Final    Lipase 07/30/2021 37  10 - 57 U/L Final    Troponin-I, Qt. 07/30/2021 0.02  0.02 - 0.05 ng/mL Final   Hospital Outpatient Visit on 07/23/2021   Component Date Value Ref Range Status    Special Requests: 07/23/2021 No Special Requests    Final    [place] Count 07/23/2021     Final                    Value:>100,000  colonies/ml      Culture result: 07/23/2021 Escherichia coli*   Final    Culture result: 07/23/2021 * vancomycin resistant enterococcus faecium * DAPTOMYCIN=2= SUSCEPTIBLE DOSE DEPENDANT    Final   Office Visit on 07/23/2021   Component Date Value Ref Range Status    Color (UA POC) 07/23/2021 Yellow   Final    Clarity (UA POC) 07/23/2021 Clear   Final    Glucose (UA POC) 07/23/2021 Negative  Negative Final    Bilirubin (UA POC) 07/23/2021 Negative  Negative Final    Ketones (UA POC) 07/23/2021 Negative  Negative Final    Specific gravity (UA POC) 07/23/2021 1.020  1.001 - 1.035 Final    Blood (UA POC) 07/23/2021 2+  Negative Final    pH (UA POC) 07/23/2021 8.5* 4.6 - 8.0 Final    Protein (UA POC) 07/23/2021 3+  Negative Final    Urobilinogen (UA POC) 07/23/2021 0.2 mg/dL  0.2 - 1 Final    Nitrites (UA POC) 07/23/2021 Negative  Negative Final    Leukocyte esterase (UA POC) 07/23/2021 3+  Negative Final   Hospital Outpatient Visit on 07/08/2021   Component Date Value Ref Range Status    SARS-CoV-2 07/08/2021 Not Detected  Not Detected Final    Comment:  This nucleic acid amplification test was developed and its  performance characteristics determined by [employer]. Nucleic acid amplification tests include RT-PCR and TMA. This test  has not been FDA cleared or approved. This test has been authorized  by FDA under an Emergency Use Authorization (EUA). This test is only  authorized for the duration of time the declaration that  circumstances exist justifying the authorization of the emergency use  of in vitro diagnostic tests for detection of SARS-CoV-2 virus and/or  diagnosis of COVID-19 infection under section 564(b)(1) of the Act,  21 U. S.C. 743TLE-7(U) (1), unless the authorization is terminated or  revoked sooner. When diagnostic testing is negative, the possibility of a false  negative result should be considered in the context of a patient's  recent exposures and the presence of clinical signs and symptoms  consistent with COVID-19. An individual without symptoms of COVID-  19 and who is not shedding SARS-CoV-2 virus w                           ould expect to have a  negative (not detected) result in this assay.   Performed At: [facility] [id number] [place] [address] [id number]  Taras Mcghee MD DB:[id number]     Admission on 06/25/2021, Discharged on 06/25/2021   Component Date Value Ref Range Status    WBC 06/25/2021 5.5  4.6 - 13.2 K/uL Final    RBC 06/25/2021 2.10* 4.20 - 5.30 M/uL Final    HGB 06/25/2021 7.4* 12.0 - 16.0 g/dL Final    HCT 06/25/2021 22.6* 35.0 - 45.0 % Final    MCV 06/25/2021 107.6* 74.0 - 97.0 FL Final    MCH 06/25/2021 35.2* 24.0 - 34.0 PG Final    MCHC 06/25/2021 32.7  31.0 - 37.0 g/dL Final    RDW 06/25/2021 17.1* 11.6 - 14.5 % Final    PLATELET 44/61/9705 103  135 - 420 K/uL Final    MPV 06/25/2021 9.2  9.2 - 11.8 FL Final    NEUTROPHILS 06/25/2021 60  40 - 73 % Final    LYMPHOCYTES 06/25/2021 36  21 - 52 % Final    MONOCYTES 06/25/2021 3  3 - 10 % Final    EOSINOPHILS 06/25/2021 0  0 - 5 % Final    BASOPHILS 06/25/2021 1  0 - 2 % Final    IMMATURE GRANULOCYTES 06/25/2021 0  % Final    ABS. NEUTROPHILS 06/25/2021 3.3  1.8 - 8.0 K/UL Final    ABS. LYMPHOCYTES 06/25/2021 2.0  0.9 - 3.6 K/UL Final    ABS. MONOCYTES 06/25/2021 0.2  0.05 - 1.2 K/UL Final    ABS. EOSINOPHILS 06/25/2021 0.0  0.0 - 0.4 K/UL Final    ABS. BASOPHILS 06/25/2021 0.1  0.0 - 0.1 K/UL Final    ABS. IMM. GRANS. 06/25/2021 0.0  K/UL Final    Sodium 06/25/2021 136  135 - 145 mmol/L Final    Potassium 06/25/2021 3.3  3.2 - 5.1 mmol/L Final    Chloride 06/25/2021 98  94 - 111 mmol/L Final    CO2 06/25/2021 26  21 - 33 mmol/L Final    Anion gap 06/25/2021 12  mmol/L Final    Glucose 06/25/2021 58* 70 - 110 mg/dL Final    BUN 06/25/2021 32* 9 - 21 mg/dL Final    Creatinine 06/25/2021 6.40* 0.70 - 1.20 mg/dL Final    BUN/Creatinine ratio 06/25/2021 5    Final    GFR est AA 06/25/2021 8  ml/min/1.73m2 Final    GFR est non-AA 06/25/2021 7  ml/min/1.73m2 Final    Comment: Estimated GFR is calculated using the IDMS-traceable Modification of Diet in Renal Disease (MDRD) Study equation, reported for both  Americans (GFRAA) and non- Americans (GFRNA), and normalized to 1.73m2 body surface area. The physician must decide which value applies to the patient. The MDRD study equation should only be used in individuals age 25 or older. It has not been validated for the following: pregnant women, patients with serious comorbid conditions, or on certain medications, or persons with extremes of body size, muscle mass, or nutritional status.  Calcium 06/25/2021 8.2* 8.5 - 10.5 mg/dL Final    Bilirubin, total 06/25/2021 0.6  0.2 - 1.0 mg/dL Final    AST (SGOT) 06/25/2021 23  14 - 74 U/L Final    ALT (SGPT) 06/25/2021 16  3 - 52 U/L Final    Alk.  phosphatase 06/25/2021 54  38 - 126 U/L Final    Protein, total 06/25/2021 6.4  6.1 - 8.4 g/dL Final    Albumin 06/25/2021 3.6  3.5 - 4.7 g/dL Final    Globulin 06/25/2021 2.8  g/dL Final    A-G Ratio 06/25/2021 1.3    Final    Lipase 06/25/2021 83* 10 - 57 U/L Final   There may be more visits with results that are not included. Assessment & Plan:      1. Moderate persistent asthma, unspecified whether complicated  Uncontrolled. We will start Trelegy and Singulair. 2. Moderate episode of recurrent major depressive disorder (HCC)  Start Lexapro    3. Atrial fibrillation, unspecified type ([facility]  Rate controlled on amiodarone. Continue Eliquis. Thyroid function normal    4. ESRD needing dialysis ([facility]  Continue HD per nephro  - MAGNESIUM  - PHOSPHORUS    5. Chronic heart failure with preserved ejection fraction (HCC)  Currently euvolemic    6. Acute deep vein thrombosis (DVT) of femoral vein of right lower extremity (HCC)  Continue Eliquis    7. Hypothyroidism, unspecified type  TSH normal.  Continue Synthroid  - TSH 3RD GENERATION    8. Medicare annual wellness visit, subsequent  Refer to separate note  - CBC WITH AUTOMATED DIFF  - METABOLIC PANEL, COMPREHENSIVE  - HEMOGLOBIN A1C W/O EAG  - LIPID PANEL  - MICROALBUMIN, UR, RAND W/ MICROALB/CREAT RATIO    9. Encounter for screening mammogram for malignant neoplasm of breast  Mammogram ordered    10. Colon cancer screening  Referral to GI    11. Morbid obesity with BMI of 50.0-59.9, adult ([facility]  Complicates all care        I have discussed the diagnosis with the patient and the intended plan as seen in the above orders. The patient has received an after-visit summary and questions were answered concerning future plans. I have discussed medication side effects and warnings with the patient as well. I have reviewed the plan of care with the patient, accepted their input and they are in agreement with the treatment goals. Previous lab and imaging results were reviewed by me.        Pascual Morfin MD  January 24, 2022

## 2022-01-24 NOTE — PROGRESS NOTES
This is the Subsequent Medicare Annual Wellness Exam, performed 12 months or more after the Initial AWV or the last Subsequent AWV    I have reviewed the patient's medical history in detail and updated the computerized patient record. Assessment/Plan   Education and counseling provided:  Are appropriate based on today's review and evaluation    1. Mild persistent asthma with (acute) exacerbation  2. Moderate episode of recurrent major depressive disorder ([facility]  3. Atrial fibrillation, unspecified type ([facility]  4. ESRD needing dialysis (HCC)  -     MAGNESIUM  -     PHOSPHORUS  5. Chronic heart failure with preserved ejection fraction (HCC)  6. Acute deep vein thrombosis (DVT) of femoral vein of right lower extremity (HCC)  7. Hypothyroidism, unspecified type  -     TSH 3RD GENERATION  8. Medicare annual wellness visit, subsequent  -     CBC WITH AUTOMATED DIFF  -     METABOLIC PANEL, COMPREHENSIVE  -     HEMOGLOBIN A1C W/O EAG  -     LIPID PANEL  -     MICROALBUMIN, UR, RAND W/ MICROALB/CREAT RATIO  9. Encounter for screening mammogram for malignant neoplasm of breast  10. Colon cancer screening  11. Morbid obesity with BMI of 50.0-59.9, adult ([facility]       Depression Risk Factor Screening     3 most recent PHQ Screens 1/20/2022   Little interest or pleasure in doing things Several days   Feeling down, depressed, irritable, or hopeless Several days   Total Score PHQ 2 2       Alcohol & Drug Abuse Risk Screen    Do you average more than 1 drink per night or more than 7 drinks a week:  No    On any one occasion in the past three months have you have had more than 3 drinks containing alcohol:  No          Functional Ability and Level of Safety    Hearing: Hearing is good. Activities of Daily Living: The home contains: no safety equipment. Patient does total self care      Ambulation: with no difficulty     Fall Risk:  Fall Risk Assessment, last 12 mths 12/24/2020   Able to walk?  Yes   Fall in past 12 months? 0 Do you feel unsteady? 0   Are you worried about falling 0   Number of falls in past 12 months -   Fall with injury? -      Abuse Screen:  Patient is not abused       Cognitive Screening    Has your family/caregiver stated any concerns about your memory: no         Health Maintenance Due     Health Maintenance Due   Topic Date Due    Hepatitis C Screening  Never done    DTaP/Tdap/Td series (1 - Tdap) Never done    Cervical cancer screen  Never done    Shingrix Vaccine Age 50> (1 of 2) Never done    Pneumococcal 0-64 years (3 of 4 - PCV13) 11/13/2017    Medicare Yearly Exam  Never done    Breast Cancer Screen Mammogram  10/01/2021    COVID-19 Vaccine (3 - Pfizer risk 4-dose series) 10/14/2021       Patient Care Team   Patient Care Team:  Yanely Oro MD as PCP - General (Family Medicine)  Yanely Oro MD as PCP [facility]  Melissa Tuttle MD (Surgery)  Thad Pearson MD (Orthopedic Surgery)    History     Patient Active Problem List   Diagnosis Code    History of kidney transplant Z94.0    CMV (cytomegalovirus) antibody positive R76.8    Hematuria R31.9    Fecal incontinence R15.9    Drug-induced hyperglycemia R73.9, T50.905A    Diverticulitis of intestine K57.92    Anemia D64.9    Acute renal failure ([facility] N17.9    Asthma J45.909    Exacerbation of asthma J45. 0    Chronic kidney disease, stage III (moderate) (HCC) N18.30    Cystic disease of kidney Q61.9    Diverticular disease of large intestine K57.30    Essential hypertension I10    Fever R50.9    Gastritis and duodenitis K29.90    Seasonal allergic rhinitis due to pollen J30.1    Hyperlipidemia E78.5    Hyperkalemia E87.5    Disease due to gram-negative bacillus B96.89    Kidney transplant rejection T86.11    Migraine without status migrainosus, not intractable G43.909    Nausea and vomiting R11.2    Pruritus L29.9    Recurrent urinary tract infection N39.0    UTI (urinary tract infection) N39.0    Renal transplant disorder T86.10    Systemic infection ([facility] A41.9    Systemic inflammatory response syndrome (SIRS) ([facility]) R65.10    Ulcer [id number]    Renal cyst N28.1    Obesity E66.9    Migraine G43.909    Hypertension I10    Hypercholesteremia E78.00    GERD (gastroesophageal reflux disease) K21.9    Burning with urination R30.0    Arrhythmia I49.9    Acute recurrent maxillary sinusitis J01.01    Calculus of gallbladder with acute cholecystitis without obstruction K80.00    Hypothyroidism E03.9    Vertigo R42    ESRD (end stage renal disease) on dialysis ([facility]) N18.6, Z99.2    History of DVT (deep vein thrombosis) Z86.718    Stomatitis K12.1    Thrush of mouth and esophagus ([facility]) B37.81, B37.0    Encounter for cholecystectomy Z76.89    Acute left-sided low back pain without sciatica M54.50    Atrial fibrillation ([facility]) I48.91    Chronic anticoagulation Z79.01    Acute hyperkalemia E87.5    Left leg pain M79.605    Intractable vomiting R11.10    Atypical chest pain R07.89    ESRD needing dialysis ([facility]) N18.6, Z99.2    Mild persistent asthma with (acute) exacerbation J45.31     Past Medical History:   Diagnosis Date    Anemia NEC     Arrhythmia     Asthma     Asthma     Burning with urination     frequent uti    Chronic kidney disease     dialysis    CMV (cytomegalovirus) antibody positive     [facility]     M/W/F    Dyspepsia and other specified disorders of function of stomach     stomach ulcer    Essential hypertension     GERD (gastroesophageal reflux disease)     Hypercholesteremia     Hypertension     Migraine     Obesity     Renal cyst 2002    kidney transplant     Ulcer       Past Surgical History:   Procedure Laterality Date    COLONOSCOPY      Dr Beena Conti  5yrs ago    ENDOSCOPY VISIT-OUTPATIENT      Dr Beena Conti  5 yrs ago    ENDOSCOPY, COLON, DIAGNOSTIC      with Dr. Alina Danielle HX CHOLECYSTECTOMY  02/2021    HX GI      ulcer    HX HEENT      sinus surg  HX RENAL TRANSPLANT      HX TRANSPLANT      kidney transplant 2002    VASCULAR SURGERY PROCEDURE UNLIST      shunt in prep for dialysis     Current Outpatient Medications   Medication Sig Dispense Refill    montelukast (SINGULAIR) 10 mg tablet Take 1 Tablet by mouth daily. 90 Tablet 0    escitalopram oxalate (LEXAPRO) 10 mg tablet Take 1 Tablet by mouth daily. 90 Tablet 0    fluticasone-umeclidinium-vilanterol (Trelegy Ellipta) 100-62.5-25 mcg inhaler Take 1 Puff by inhalation daily. 60 Each 5    hyoscyamine SL (LEVSIN/SL) 0.125 mg SL tablet 1 Tablet by SubLINGual route every four (4) hours as needed (irritable bowel). 30 Tablet 5    Dexilant 60 mg CpDB capsule (delayed release) TAKE 1 CAPSULE BY MOUTH EVERY DAY 30 Capsule 5    meclizine (ANTIVERT) 25 mg tablet TAKE 1 TABLET BY MOUTH THREE TIMES DAILY FOR UP TO 10 DAYS AS NEEDED FOR DIZZINESS 21 Tablet 0    albuterol (PROVENTIL VENTOLIN) 2.5 mg /3 mL (0.083 %) nebu USE 1 VIAL VIA NEBULIZER THREE TIMES DAILY 90 mL 3    gabapentin (NEURONTIN) 100 mg capsule 2 capsules  Capsule 0    sucralfate (CARAFATE) 1 gram tablet TAKE 1 TABLET BY MOUTH FOUR TIMES DAILY 360 Tablet 0    amLODIPine (NORVASC) 10 mg tablet Take 1 Tablet by mouth daily. 30 Tablet 1    famotidine (PEPCID) 20 mg tablet TAKE 1 TABLET BY MOUTH TWICE DAILY 30 Tablet 1    amiodarone (CORDARONE) 200 mg tablet TAKE 1 TABLET BY MOUTH EVERY DAY 90 Tablet 2    valGANciclovir (VALCYTE) 450 mg tablet TAKE 2 TABLETS BY MOUTH DAILY 90 Tablet 5    apixaban (Eliquis DVT-PE Treat 30D Start) 5 mg (74 tabs) starter pack Take 5 mg by mouth twice a day for 7 days Followed by 2.5 mg by mouth twice a day 1 Dose Pack 0    levothyroxine (SYNTHROID) 50 mcg tablet Take 1 Tablet by mouth daily. 90 Tablet 3    carvediloL (COREG) 6.25 mg tablet Take 1 Tablet by mouth two (2) times daily (with meals). 180 Tablet 2    losartan (COZAAR) 50 mg tablet Take 1 Tablet by mouth daily.  90 Tablet 3    calcitRIOL (ROCALTROL) 0.25 mcg capsule Take 1 Capsule by mouth as directed. Take on non dialysis days      doxercalciferol (HECTOROL IV) 14 mcg by IntraVENous route two (2) days a week.  lidocaine-prilocaine (EMLA) topical cream as directed.  RenaPlex-D 800 mcg-12.5 mg -2,000 unit tab Take 1 Tablet by mouth daily.  sevelamer carbonate (RENVELA) 800 mg tab tab Take 800 mg by mouth three (3) times daily.  zolpidem (AMBIEN) 5 mg tablet Take 5 mg by mouth nightly as needed.  dicyclomine (BENTYL) 10 mg capsule TAKE 1 CAPSULE BY MOUTH FOUR TIMES DAILY 120 Cap 1    simvastatin (ZOCOR) 20 mg tablet TAKE 1 TABLET BY MOUTH DAILY AS DIRECTED. 90 Tab 1    aluminum & magnesium hydroxide-simethicone (Maalox Maximum Strength) 400-400-40 mg/5 mL suspension Take 10 mL by mouth every six (6) hours as needed for Indigestion. 250 mL 0    ESTRACE 0.01 % (0.1 mg/gram) vaginal cream INSERT 2 GRAMS INTO VAGINA EVERY MONDAY AND THURSDAY 42.5 g 5    cranberry extract 425 mg cap 425 mg.  LORazepam (ATIVAN) 0.5 mg tablet Take 0.5 mg by mouth daily.  fluticasone (FLONASE) 50 mcg/actuation nasal spray 1 Spray.  EPINEPHrine (EPIPEN) 0.3 mg/0.3 mL injection 0.3 mg. Allergies   Allergen Reactions    Aspirin Rash and Unknown (comments)    Bactrim [Sulfamethoxazole-Trimethoprim] Rash and Unknown (comments)    Bromfenac Rash and Unknown (comments)    Cefepime Other (comments)     Neurological reaction    Ceftriaxone Rash    Copper Rash    Ibuprofen Rash and Unknown (comments)    Ketorolac Tromethamine Rash and Unknown (comments)    Morphine Rash and Unknown (comments)    Relafen [Nabumetone] Rash and Unknown (comments)    Rifampin Rash and Unknown (comments)    Vancomycin Rash and Unknown (comments)     Pt reports causes itching, takes benadryl prior to use. Pt denies anaphylaxis.     Requires benadryl with each vancomycin dose       Family History   Problem Relation Age of Onset    Colon Polyps Brother     Cancer Mother     Diabetes Father      Social History     Tobacco Use    Smoking status: Never Smoker    Smokeless tobacco: Never Used   Substance Use Topics    Alcohol use: No         Hamlet Brown MD

## 2022-01-25 ENCOUNTER — PATIENT OUTREACH (OUTPATIENT)
Dept: CASE MANAGEMENT | Age: 67
End: 2022-01-25

## 2022-01-25 NOTE — PROGRESS NOTES
Patient has graduated from the Transitions of Care Coordination  program on 1/25/2022. Patient/family has the ability to self-manage at this time Care management goals have been completed. Patient was not referred to the [facility] team for further management. Patient was never reached  Patient has Care Transition Nurse's contact information for any further questions, concerns, or needs.   Patients upcoming visits:    Future Appointments   Date Time Provider Elena Coello   2/2/2022  1:00 PM [facility]   2/3/2022  2:15 PM Otoniel Jaime MD [facility] BS AMB   3/9/2022 11:30 AM MD SHANAE Arana BS AMB   3/23/2022  3:15 PM MD MAKAYLA Lowery BS AMB

## 2022-02-02 ENCOUNTER — HOSPITAL ENCOUNTER (OUTPATIENT)
Age: 67
Setting detail: OBSERVATION
Discharge: HOME | End: 2022-02-03
Attending: FAMILY MEDICINE | Admitting: INTERNAL MEDICINE
Payer: MEDICARE

## 2022-02-02 ENCOUNTER — APPOINTMENT (OUTPATIENT)
Dept: GENERAL RADIOLOGY | Age: 67
End: 2022-02-02
Attending: FAMILY MEDICINE
Payer: MEDICARE

## 2022-02-02 DIAGNOSIS — E87.70 HYPERVOLEMIA, UNSPECIFIED HYPERVOLEMIA TYPE: Primary | ICD-10-CM

## 2022-02-02 DIAGNOSIS — N18.6 END STAGE RENAL DISEASE (HCC): ICD-10-CM

## 2022-02-02 LAB
ANION GAP SERPL CALC-SCNC: 12 MMOL/L
ATRIAL RATE: 75 BPM
BASOPHILS # BLD: 0.1 K/UL (ref 0–0.1)
BASOPHILS NFR BLD: 1 % (ref 0–2)
BNP SERPL-MCNC: 1530 PG/ML (ref 0–100)
BUN SERPL-MCNC: 40 MG/DL (ref 9–21)
BUN/CREAT SERPL: 6
CA-I BLD-MCNC: 8.2 MG/DL (ref 8.5–10.5)
CALCULATED P AXIS, ECG09: 15 DEGREES
CALCULATED R AXIS, ECG10: 54 DEGREES
CALCULATED T AXIS, ECG11: 45 DEGREES
CHLORIDE SERPL-SCNC: 98 MMOL/L (ref 94–111)
CO2 SERPL-SCNC: 27 MMOL/L (ref 21–33)
COVID-19 RAPID TEST, COVR: NOT DETECTED
CREAT SERPL-MCNC: 6.9 MG/DL (ref 0.7–1.2)
DIAGNOSIS, 93000: NORMAL
DIFFERENTIAL METHOD BLD: ABNORMAL
EOSINOPHIL # BLD: 0.1 K/UL (ref 0–0.4)
EOSINOPHIL NFR BLD: 1 % (ref 0–5)
ERYTHROCYTE [DISTWIDTH] IN BLOOD BY AUTOMATED COUNT: 16.1 % (ref 11.6–14.5)
GLUCOSE SERPL-MCNC: 93 MG/DL (ref 70–110)
HBV SURFACE AG SER QL: <0.1 INDEX
HBV SURFACE AG SER QL: NEGATIVE
HCT VFR BLD AUTO: 33.8 % (ref 35–45)
HGB BLD-MCNC: 10.4 G/DL (ref 12–16)
IMM GRANULOCYTES # BLD AUTO: 0.1 K/UL (ref 0–0.04)
IMM GRANULOCYTES NFR BLD AUTO: 1 % (ref 0–0.5)
LYMPHOCYTES # BLD: 1.2 K/UL (ref 0.9–3.6)
LYMPHOCYTES NFR BLD: 20 % (ref 21–52)
MCH RBC QN AUTO: 35.5 PG (ref 24–34)
MCHC RBC AUTO-ENTMCNC: 30.8 G/DL (ref 31–37)
MCV RBC AUTO: 115.4 FL (ref 78–100)
MONOCYTES # BLD: 0.1 K/UL (ref 0.05–1.2)
MONOCYTES NFR BLD: 2 % (ref 3–10)
NEUTS SEG # BLD: 4.5 K/UL (ref 1.8–8)
NEUTS SEG NFR BLD: 75 % (ref 40–73)
NRBC # BLD: 0 K/UL (ref 0–0.01)
NRBC BLD-RTO: 0 PER 100 WBC
P-R INTERVAL, ECG05: 187 MS
PLATELET # BLD AUTO: 175 K/UL (ref 135–420)
PMV BLD AUTO: 10.4 FL (ref 9.2–11.8)
POTASSIUM SERPL-SCNC: 6.1 MMOL/L (ref 3.2–5.1)
Q-T INTERVAL, ECG07: 425 MS
QRS DURATION, ECG06: 98 MS
QTC CALCULATION (BEZET), ECG08: 475 MS
RBC # BLD AUTO: 2.93 M/UL (ref 4.2–5.3)
RBC MORPH BLD: ABNORMAL
RBC MORPH BLD: ABNORMAL
SODIUM SERPL-SCNC: 137 MMOL/L (ref 135–145)
SPECIMEN SOURCE: NORMAL
VENTRICULAR RATE, ECG03: 75 BPM
WBC # BLD AUTO: 6.1 K/UL (ref 4.6–13.2)

## 2022-02-02 PROCEDURE — 96366 THER/PROPH/DIAG IV INF ADDON: CPT

## 2022-02-02 PROCEDURE — 74011000250 HC RX REV CODE- 250: Performed by: NURSE PRACTITIONER

## 2022-02-02 PROCEDURE — 90935 HEMODIALYSIS ONE EVALUATION: CPT

## 2022-02-02 PROCEDURE — 71045 X-RAY EXAM CHEST 1 VIEW: CPT

## 2022-02-02 PROCEDURE — 96375 TX/PRO/DX INJ NEW DRUG ADDON: CPT

## 2022-02-02 PROCEDURE — 87635 SARS-COV-2 COVID-19 AMP PRB: CPT

## 2022-02-02 PROCEDURE — 74011250636 HC RX REV CODE- 250/636

## 2022-02-02 PROCEDURE — 83880 ASSAY OF NATRIURETIC PEPTIDE: CPT

## 2022-02-02 PROCEDURE — 74011000250 HC RX REV CODE- 250: Performed by: FAMILY MEDICINE

## 2022-02-02 PROCEDURE — 85025 COMPLETE CBC W/AUTO DIFF WBC: CPT

## 2022-02-02 PROCEDURE — G0378 HOSPITAL OBSERVATION PER HR: HCPCS

## 2022-02-02 PROCEDURE — 74011250636 HC RX REV CODE- 250/636: Performed by: INTERNAL MEDICINE

## 2022-02-02 PROCEDURE — 87340 HEPATITIS B SURFACE AG IA: CPT

## 2022-02-02 PROCEDURE — 94640 AIRWAY INHALATION TREATMENT: CPT

## 2022-02-02 PROCEDURE — 74011250636 HC RX REV CODE- 250/636: Performed by: FAMILY MEDICINE

## 2022-02-02 PROCEDURE — 74011250636 HC RX REV CODE- 250/636: Performed by: NURSE PRACTITIONER

## 2022-02-02 PROCEDURE — 74011250637 HC RX REV CODE- 250/637: Performed by: NURSE PRACTITIONER

## 2022-02-02 PROCEDURE — 80048 BASIC METABOLIC PNL TOTAL CA: CPT

## 2022-02-02 PROCEDURE — 99285 EMERGENCY DEPT VISIT HI MDM: CPT

## 2022-02-02 PROCEDURE — 94664 DEMO&/EVAL PT USE INHALER: CPT

## 2022-02-02 PROCEDURE — 96365 THER/PROPH/DIAG IV INF INIT: CPT

## 2022-02-02 PROCEDURE — 74011000250 HC RX REV CODE- 250

## 2022-02-02 PROCEDURE — 96372 THER/PROPH/DIAG INJ SC/IM: CPT

## 2022-02-02 PROCEDURE — 93005 ELECTROCARDIOGRAM TRACING: CPT

## 2022-02-02 PROCEDURE — 65270000029 HC RM PRIVATE

## 2022-02-02 PROCEDURE — 74011250637 HC RX REV CODE- 250/637: Performed by: INTERNAL MEDICINE

## 2022-02-02 PROCEDURE — 96376 TX/PRO/DX INJ SAME DRUG ADON: CPT

## 2022-02-02 PROCEDURE — 74011250636 HC RX REV CODE- 250/636: Performed by: STUDENT IN AN ORGANIZED HEALTH CARE EDUCATION/TRAINING PROGRAM

## 2022-02-02 PROCEDURE — 96374 THER/PROPH/DIAG INJ IV PUSH: CPT

## 2022-02-02 PROCEDURE — 94644 CONT INHLJ TX 1ST HOUR: CPT

## 2022-02-02 RX ORDER — FLUTICASONE PROPIONATE 110 UG/1
1 AEROSOL, METERED RESPIRATORY (INHALATION) DAILY
Status: DISCONTINUED | OUTPATIENT
Start: 2022-02-03 | End: 2022-02-03 | Stop reason: HOSPADM

## 2022-02-02 RX ORDER — LEVOTHYROXINE SODIUM 50 UG/1
50 TABLET ORAL DAILY
Status: DISCONTINUED | OUTPATIENT
Start: 2022-02-03 | End: 2022-02-03 | Stop reason: HOSPADM

## 2022-02-02 RX ORDER — MAG HYDROX/ALUMINUM HYD/SIMETH 200-200-20
30 SUSPENSION, ORAL (FINAL DOSE FORM) ORAL
Status: DISCONTINUED | OUTPATIENT
Start: 2022-02-02 | End: 2022-02-03 | Stop reason: HOSPADM

## 2022-02-02 RX ORDER — FLUTICASONE PROPIONATE 50 MCG
1 SPRAY, SUSPENSION (ML) NASAL DAILY
Status: DISCONTINUED | OUTPATIENT
Start: 2022-02-03 | End: 2022-02-03 | Stop reason: HOSPADM

## 2022-02-02 RX ORDER — VALGANCICLOVIR 450 MG/1
900 TABLET, FILM COATED ORAL DAILY
Status: DISCONTINUED | OUTPATIENT
Start: 2022-02-03 | End: 2022-02-03 | Stop reason: HOSPADM

## 2022-02-02 RX ORDER — ALBUTEROL SULFATE 90 UG/1
2 AEROSOL, METERED RESPIRATORY (INHALATION)
Status: DISCONTINUED | OUTPATIENT
Start: 2022-02-02 | End: 2022-02-03 | Stop reason: HOSPADM

## 2022-02-02 RX ORDER — ALUMINA, MAGNESIA, AND SIMETHICONE 2400; 2400; 240 MG/30ML; MG/30ML; MG/30ML
10 SUSPENSION ORAL
Status: DISCONTINUED | OUTPATIENT
Start: 2022-02-02 | End: 2022-02-02

## 2022-02-02 RX ORDER — IPRATROPIUM BROMIDE AND ALBUTEROL SULFATE 2.5; .5 MG/3ML; MG/3ML
3 SOLUTION RESPIRATORY (INHALATION)
Status: COMPLETED | OUTPATIENT
Start: 2022-02-02 | End: 2022-02-02

## 2022-02-02 RX ORDER — MORPHINE SULFATE 4 MG/ML
4 INJECTION, SOLUTION INTRAMUSCULAR; INTRAVENOUS ONCE
Status: COMPLETED | OUTPATIENT
Start: 2022-02-02 | End: 2022-02-02

## 2022-02-02 RX ORDER — SODIUM CHLORIDE 0.9 % (FLUSH) 0.9 %
5-40 SYRINGE (ML) INJECTION EVERY 8 HOURS
Status: DISCONTINUED | OUTPATIENT
Start: 2022-02-02 | End: 2022-02-03 | Stop reason: HOSPADM

## 2022-02-02 RX ORDER — CALCITRIOL 0.25 UG/1
0.25 CAPSULE ORAL DAILY
Status: DISCONTINUED | OUTPATIENT
Start: 2022-02-03 | End: 2022-02-03 | Stop reason: HOSPADM

## 2022-02-02 RX ORDER — SUCRALFATE 1 G/1
1 TABLET ORAL
Status: DISCONTINUED | OUTPATIENT
Start: 2022-02-02 | End: 2022-02-03 | Stop reason: HOSPADM

## 2022-02-02 RX ORDER — ALBUTEROL SULFATE 90 UG/1
2 AEROSOL, METERED RESPIRATORY (INHALATION)
Status: DISCONTINUED | OUTPATIENT
Start: 2022-02-02 | End: 2022-02-02

## 2022-02-02 RX ORDER — IPRATROPIUM BROMIDE AND ALBUTEROL SULFATE 2.5; .5 MG/3ML; MG/3ML
SOLUTION RESPIRATORY (INHALATION)
Status: COMPLETED
Start: 2022-02-02 | End: 2022-02-02

## 2022-02-02 RX ORDER — AMIODARONE HYDROCHLORIDE 200 MG/1
200 TABLET ORAL DAILY
Status: DISCONTINUED | OUTPATIENT
Start: 2022-02-03 | End: 2022-02-03 | Stop reason: HOSPADM

## 2022-02-02 RX ORDER — POLYETHYLENE GLYCOL 3350 17 G/17G
17 POWDER, FOR SOLUTION ORAL DAILY PRN
Status: DISCONTINUED | OUTPATIENT
Start: 2022-02-02 | End: 2022-02-03 | Stop reason: HOSPADM

## 2022-02-02 RX ORDER — LORAZEPAM 0.5 MG/1
0.5 TABLET ORAL DAILY
Status: DISCONTINUED | OUTPATIENT
Start: 2022-02-03 | End: 2022-02-03 | Stop reason: HOSPADM

## 2022-02-02 RX ORDER — FAMOTIDINE 20 MG/1
20 TABLET, FILM COATED ORAL 2 TIMES DAILY
Status: DISCONTINUED | OUTPATIENT
Start: 2022-02-02 | End: 2022-02-03 | Stop reason: HOSPADM

## 2022-02-02 RX ORDER — ALBUTEROL SULFATE 2.5 MG/.5ML
7.5 SOLUTION RESPIRATORY (INHALATION)
Status: COMPLETED | OUTPATIENT
Start: 2022-02-02 | End: 2022-02-02

## 2022-02-02 RX ORDER — ALBUTEROL SULFATE 0.83 MG/ML
10 SOLUTION RESPIRATORY (INHALATION)
Status: DISCONTINUED | OUTPATIENT
Start: 2022-02-02 | End: 2022-02-02

## 2022-02-02 RX ORDER — FUROSEMIDE 10 MG/ML
40 INJECTION INTRAMUSCULAR; INTRAVENOUS
Status: COMPLETED | OUTPATIENT
Start: 2022-02-02 | End: 2022-02-02

## 2022-02-02 RX ORDER — GABAPENTIN 100 MG/1
100 CAPSULE ORAL 2 TIMES DAILY
Status: DISCONTINUED | OUTPATIENT
Start: 2022-02-02 | End: 2022-02-03 | Stop reason: HOSPADM

## 2022-02-02 RX ORDER — ZOLPIDEM TARTRATE 5 MG/1
5 TABLET ORAL
Status: DISCONTINUED | OUTPATIENT
Start: 2022-02-02 | End: 2022-02-03 | Stop reason: HOSPADM

## 2022-02-02 RX ORDER — MONTELUKAST SODIUM 10 MG/1
10 TABLET ORAL DAILY
Status: DISCONTINUED | OUTPATIENT
Start: 2022-02-03 | End: 2022-02-03 | Stop reason: HOSPADM

## 2022-02-02 RX ORDER — SODIUM CHLORIDE 9 MG/ML
2000 INJECTION, SOLUTION INTRAVENOUS
Status: DISCONTINUED | OUTPATIENT
Start: 2022-02-02 | End: 2022-02-03 | Stop reason: HOSPADM

## 2022-02-02 RX ORDER — LOSARTAN POTASSIUM 25 MG/1
50 TABLET ORAL DAILY
Status: DISCONTINUED | OUTPATIENT
Start: 2022-02-03 | End: 2022-02-03 | Stop reason: HOSPADM

## 2022-02-02 RX ORDER — AMLODIPINE BESYLATE 5 MG/1
10 TABLET ORAL DAILY
Status: DISCONTINUED | OUTPATIENT
Start: 2022-02-03 | End: 2022-02-03 | Stop reason: HOSPADM

## 2022-02-02 RX ORDER — MAGNESIUM SULFATE HEPTAHYDRATE 40 MG/ML
2 INJECTION, SOLUTION INTRAVENOUS
Status: COMPLETED | OUTPATIENT
Start: 2022-02-02 | End: 2022-02-02

## 2022-02-02 RX ORDER — ACETAMINOPHEN 325 MG/1
650 TABLET ORAL
Status: DISCONTINUED | OUTPATIENT
Start: 2022-02-02 | End: 2022-02-03 | Stop reason: HOSPADM

## 2022-02-02 RX ORDER — ONDANSETRON 2 MG/ML
4 INJECTION INTRAMUSCULAR; INTRAVENOUS
Status: DISCONTINUED | OUTPATIENT
Start: 2022-02-02 | End: 2022-02-03 | Stop reason: HOSPADM

## 2022-02-02 RX ORDER — ESCITALOPRAM OXALATE 10 MG/1
10 TABLET ORAL DAILY
Status: DISCONTINUED | OUTPATIENT
Start: 2022-02-03 | End: 2022-02-03 | Stop reason: HOSPADM

## 2022-02-02 RX ORDER — ALBUTEROL SULFATE 0.83 MG/ML
2.5 SOLUTION RESPIRATORY (INHALATION)
Status: DISCONTINUED | OUTPATIENT
Start: 2022-02-02 | End: 2022-02-02

## 2022-02-02 RX ORDER — ATORVASTATIN CALCIUM 10 MG/1
10 TABLET, FILM COATED ORAL
Status: DISCONTINUED | OUTPATIENT
Start: 2022-02-02 | End: 2022-02-03 | Stop reason: HOSPADM

## 2022-02-02 RX ORDER — SODIUM POLYSTYRENE SULFONATE 15 G/60ML
15 SUSPENSION ORAL; RECTAL
Status: COMPLETED | OUTPATIENT
Start: 2022-02-02 | End: 2022-02-02

## 2022-02-02 RX ORDER — PANTOPRAZOLE SODIUM 40 MG/1
40 TABLET, DELAYED RELEASE ORAL DAILY
Status: DISCONTINUED | OUTPATIENT
Start: 2022-02-03 | End: 2022-02-03 | Stop reason: HOSPADM

## 2022-02-02 RX ORDER — SEVELAMER CARBONATE 800 MG/1
800 TABLET, FILM COATED ORAL 3 TIMES DAILY
Status: DISCONTINUED | OUTPATIENT
Start: 2022-02-02 | End: 2022-02-03 | Stop reason: HOSPADM

## 2022-02-02 RX ADMIN — FAMOTIDINE 20 MG: 20 TABLET, FILM COATED ORAL at 18:33

## 2022-02-02 RX ADMIN — FUROSEMIDE 40 MG: 10 INJECTION, SOLUTION INTRAMUSCULAR; INTRAVENOUS at 06:42

## 2022-02-02 RX ADMIN — SEVELAMER CARBONATE 800 MG: 800 TABLET, FILM COATED ORAL at 21:55

## 2022-02-02 RX ADMIN — ALBUTEROL SULFATE 2 PUFF: 108 AEROSOL, METERED RESPIRATORY (INHALATION) at 19:33

## 2022-02-02 RX ADMIN — ALBUTEROL SULFATE 7.5 MG: 2.5 SOLUTION RESPIRATORY (INHALATION) at 05:49

## 2022-02-02 RX ADMIN — SODIUM CHLORIDE, PRESERVATIVE FREE 10 ML: 5 INJECTION INTRAVENOUS at 16:39

## 2022-02-02 RX ADMIN — SUCRALFATE 1 G: 1 TABLET ORAL at 18:33

## 2022-02-02 RX ADMIN — GABAPENTIN 100 MG: 100 CAPSULE ORAL at 18:33

## 2022-02-02 RX ADMIN — SUCRALFATE 1 G: 1 TABLET ORAL at 21:55

## 2022-02-02 RX ADMIN — IPRATROPIUM BROMIDE AND ALBUTEROL SULFATE 3 ML: 2.5; .5 SOLUTION RESPIRATORY (INHALATION) at 05:26

## 2022-02-02 RX ADMIN — ACETAMINOPHEN 650 MG: 325 TABLET ORAL at 15:09

## 2022-02-02 RX ADMIN — MORPHINE SULFATE 4 MG: 4 INJECTION, SOLUTION INTRAMUSCULAR; INTRAVENOUS at 16:39

## 2022-02-02 RX ADMIN — SODIUM CHLORIDE 2000 ML: 9 INJECTION, SOLUTION INTRAVENOUS at 17:40

## 2022-02-02 RX ADMIN — IPRATROPIUM BROMIDE AND ALBUTEROL SULFATE 3 ML: .5; 2.5 SOLUTION RESPIRATORY (INHALATION) at 05:26

## 2022-02-02 RX ADMIN — METHYLPREDNISOLONE SODIUM SUCCINATE 125 MG: 125 INJECTION, POWDER, FOR SOLUTION INTRAMUSCULAR; INTRAVENOUS at 05:23

## 2022-02-02 RX ADMIN — MAGNESIUM SULFATE 2 G: 2 INJECTION INTRAVENOUS at 08:06

## 2022-02-02 RX ADMIN — SODIUM POLYSTYRENE SULFONATE 15 G: 15 SUSPENSION ORAL; RECTAL at 13:57

## 2022-02-02 RX ADMIN — SODIUM CHLORIDE, PRESERVATIVE FREE 10 ML: 5 INJECTION INTRAVENOUS at 21:56

## 2022-02-02 RX ADMIN — ALBUTEROL SULFATE 2 PUFF: 108 AEROSOL, METERED RESPIRATORY (INHALATION) at 15:16

## 2022-02-02 RX ADMIN — ATORVASTATIN CALCIUM 10 MG: 10 TABLET, FILM COATED ORAL at 21:55

## 2022-02-02 RX ADMIN — METHYLPREDNISOLONE SODIUM SUCCINATE 60 MG: 40 INJECTION, POWDER, FOR SOLUTION INTRAMUSCULAR; INTRAVENOUS at 13:57

## 2022-02-02 RX ADMIN — METHYLPREDNISOLONE SODIUM SUCCINATE 40 MG: 40 INJECTION, POWDER, FOR SOLUTION INTRAMUSCULAR; INTRAVENOUS at 21:55

## 2022-02-02 NOTE — PROGRESS NOTES
Patient has known esrd runs mondays and fridays  Admitted with fever and increased potassium  Have spoken with the dialysis nurse and given orders for her to run today again Friday if not discharged

## 2022-02-02 NOTE — ED TRIAGE NOTES
Patient came by EMS with shortness of breath and wheezing . Patient had this for 3 weeks . Finished prednisone taper 1 week ago.

## 2022-02-02 NOTE — PROGRESS NOTES
0830- assumed care of patient, vital signs taken, alert and oriented, iv in place left ac 20 g and infusing, tele on patient running SR east 1, wheezing noted bilateral - 02 98% on room air, room set up, call bell in reach, bed alarm on, no further distress at this time. Received last dialysis treatment past Friday. Dialysis Nurse aware for treatment today. Left Limb Alert - Dialysis access. Rapid Covid Test pending. 1400- vital signs taken, iv med's given and kayexalate. Wheezing bilateral noted - O2 Sat's 99% on room air- cortisone iv given. Rapid negative. Hx of C-Diff- contact precaution! 1450- taken to dialysis - off floor - TELE on standby. 1500- Dialysis nurse Mar Citron called and states that patient is complaining of CP - went to assess patient - complains of pain with expiration/inhalation. 1510- notified hospitalist - gave prn tylenol, and notified RT for breathing treatment - no new orders. [address] called that patients pain is not relieved yet - called NP Benigno - verbalized to call RT for another treatment with trelegy nebulizer - RT reached out to NP Benigno - hospitalist to see patient in dialysis! 1640- Morphine iv given per new order NP Benigno - went to see patient in dialysis. 1700- in dialysis. 1830- returned to room from dialysis - removed 2 l - dialysis access intact - no bleeding, vital signs taken, Tele on -  bpm. Med's and dinner given, no RT or other distress.    SCD's on patient lower extremities bilateral!

## 2022-02-02 NOTE — PROGRESS NOTES
Comprehensive Nutrition Assessment    Type and Reason for Visit: Initial    Nutrition Recommendations/Plan: cardiac diet with K+ restriction    Nutrition Assessment:  60 yo female PMH ESRD with HD but only going twice a week, HTN, HLP, hypothyroidism   Morbidly obese BMI 30.6 pt admitted for SOB x 3 weeks has hx of ESRD with HD but only goes twice a week so NP suspects acute CHF as pt also with increased weight gain and K+ 6.1 given 15 g Kayexalate and Nephrology consult. Recent Results (from the past 24 hour(s))   CBC WITH AUTOMATED DIFF    Collection Time: 02/02/22  5:39 AM   Result Value Ref Range    WBC 6.1 4.6 - 13.2 K/uL    RBC 2.93 (L) 4.20 - 5.30 M/uL    HGB 10.4 (L) 12.0 - 16.0 g/dL    HCT 33.8 (L) 35.0 - 45.0 %    .4 (H) 78.0 - 100.0 FL    MCH 35.5 (H) 24.0 - 34.0 PG    MCHC 30.8 (L) 31.0 - 37.0 g/dL    RDW 16.1 (H) 11.6 - 14.5 %    PLATELET 948 777 - 938 K/uL    MPV 10.4 9.2 - 11.8 FL    NRBC 0.0 0.0  WBC    ABSOLUTE NRBC 0.00 0.00 - 0.01 K/uL    NEUTROPHILS 75 (H) 40 - 73 %    LYMPHOCYTES 20 (L) 21 - 52 %    MONOCYTES 2 (L) 3 - 10 %    EOSINOPHILS 1 0 - 5 %    BASOPHILS 1 0 - 2 %    IMMATURE GRANULOCYTES 1 (H) 0 - 0.5 %    ABS. NEUTROPHILS 4.5 1.8 - 8.0 K/UL    ABS. LYMPHOCYTES 1.2 0.9 - 3.6 K/UL    ABS. MONOCYTES 0.1 0.05 - 1.2 K/UL    ABS. EOSINOPHILS 0.1 0.0 - 0.4 K/UL    ABS. BASOPHILS 0.1 0.0 - 0.1 K/UL    ABS. IMM.  GRANS. 0.1 (H) 0.00 - 0.04 K/UL    DF Manual      RBC COMMENTS Anisocytosis  1+        RBC COMMENTS Macrocytosis  1+       METABOLIC PANEL, BASIC    Collection Time: 02/02/22  5:39 AM   Result Value Ref Range    Sodium 137 135 - 145 mmol/L    Potassium 6.1 (HH) 3.2 - 5.1 mmol/L    Chloride 98 94 - 111 mmol/L    CO2 27 21 - 33 mmol/L    Anion gap 12 mmol/L    Glucose 93 70 - 110 mg/dL    BUN 40 (H) 9 - 21 mg/dL    Creatinine 6.90 (H) 0.70 - 1.20 mg/dL    BUN/Creatinine ratio 6      GFR est AA 7 ml/min/1.73m2    GFR est non-AA 6 ml/min/1.73m2    Calcium 8.2 (L) 8.5 - 10.5 mg/dL   HEP B SURFACE AG    Collection Time: 02/02/22  5:39 AM   Result Value Ref Range    Hepatitis B surface Ag <0.10 <1.00 Index    Hep B surface Ag Interp. Negative Negative   BNP    Collection Time: 02/02/22  5:39 AM   Result Value Ref Range    BNP 1,530 (H) 0 - 100 pg/mL   EKG, 12 LEAD, INITIAL    Collection Time: 02/02/22  7:44 AM   Result Value Ref Range    Ventricular Rate 75 BPM    Atrial Rate 75 BPM    P-R Interval 187 ms    QRS Duration 98 ms    Q-T Interval 425 ms    QTC Calculation (Bezet) 475 ms    Calculated P Axis 15 degrees    Calculated R Axis 54 degrees    Calculated T Axis 45 degrees    Diagnosis Sinus rhythm  Baseline wander in lead(s) V2      COVID-19 RAPID TEST    Collection Time: 02/02/22  7:52 AM   Result Value Ref Range    Specimen source Swab      COVID-19 rapid test Not Detected Not Detected         Malnutrition Assessment:  Malnutrition Status:  Mild malnutrition    Context:  Acute illness     Findings of the 6 clinical characteristics of malnutrition:   Energy Intake:  No significant decrease in energy intake  Weight Loss:  No significant weight loss     Body Fat Loss:  No significant body fat loss,     Muscle Mass Loss:  No significant muscle mass loss,    Fluid Accumulation:  1 - Mild (Pt only going to HD twice a week), Extremities   Strength:            Estimated Daily Nutrient Needs:  Energy (kcal): 4586-6985 kcal/day; Weight Used for Energy Requirements: Admission (74 kg)  Protein (g): 60-74 g/day; Weight Used for Protein Requirements: Admission (0.8-1 g/kg)  Fluid (ml/day): 1000 mL/day; Method Used for Fluid Requirements: Standard renal      Nutrition Related Findings: Morbidly obese BMI 30.6 pt admitted for SOB x 3 weeks has hx of ESRD with HD but only goes twice a week so NP suspects acute CHF as pt also with increased weight gain and K+ 6.1 given 15 g Kayexalate and Nephrology consult. Wounds:    None       Current Nutrition Therapies:  ADULT DIET Regular;  Low Fat/Low Chol/High Fiber/KAL; Low Potassium (Less than 3000 mg/day); renal    Anthropometric Measures:  · Height:  5' 1\" (154.9 cm)  · Current Body Wt:  73.5 kg (162 lb)   · Admission Body Wt:  162 lb    · Usual Body Wt:        · Ideal Body Wt:  105 lbs:  154.3 %   · Adjusted Body Weight:   ; Weight Adjustment for: No adjustment   · Adjusted BMI:       · BMI Category:  Obese class 1 (BMI 30.0-34. 9)       Nutrition Diagnosis:   · Altered nutrition-related lab values related to renal dysfunction as evidenced by lab values,localized or generalized fluid accumulation      Nutrition Interventions:   Food and/or Nutrient Delivery: Continue current diet  Nutrition Education and Counseling: Education not indicated (pending nephrology consult pt has only been going to HD twice a week)  Coordination of Nutrition Care: Continue to monitor while inpatient    Goals:  K+ 3.5-5.1, Pt to eat greater than 75% of meals, BM every 1-3 days, BMI 22-25       Nutrition Monitoring and Evaluation:   Behavioral-Environmental Outcomes:    Food/Nutrient Intake Outcomes: Food and nutrient intake  Physical Signs/Symptoms Outcomes: Biochemical data,Meal time behavior,Weight,Fluid status or edema     F/u: 2/4/2022    Discharge Planning:    Continue current diet     Electronically signed by Joseph Su on 2/2/2022 at 3:49 PM    Contact: ANTHONY [phone]

## 2022-02-02 NOTE — H&P
History and Physical    Subjective:     Martha Cheadle is a 61 y.o. female with a past medical Hx significant for ESRD/HD  (2x/week), HTN, HLP, Asthma, Renal transplant (2002),and Hypothyroidism, who presents to the ED today with complaints of shortness of breath x 3 weeks. Hx is obtained from patient. Pt states she has been short of breath since last hospitalization in December, states her symptom never really went away, states she believes asthma flare had returned since December after several years without  a flare, states does not see a pumonologist. states sob is worse on exertion, and also exercabated with cough, +ve orthopnea. Denies fevers, chills, chest pain, nausea, vomiting, or any other symptoms. states she saw her pcp, who gave her a steroid tablets for 1 week, yet no relief. States in the Last 2 days, sob is worsening, cough is non productive and increased, no relief with her neb treatment, states wheezing is worsened, and this prompted her ED visit. States she had been told by HD nurses that she is having increased weight gain lately, No other complaints verbalized.       Past Medical History:   Diagnosis Date    Anemia NEC     Arrhythmia     Asthma     Asthma     Burning with urination     frequent uti    Chronic kidney disease     dialysis    CMV (cytomegalovirus) antibody positive     [facility]     M/W/F    Dyspepsia and other specified disorders of function of stomach     stomach ulcer    Essential hypertension     GERD (gastroesophageal reflux disease)     Hypercholesteremia     Hypertension     Migraine     Obesity     Renal cyst 2002    kidney transplant     Ulcer       Past Surgical History:   Procedure Laterality Date    COLONOSCOPY      Dr Martín Harrell  5yrs ago    ENDOSCOPY VISIT-OUTPATIENT      Dr Martín Harrell  5 yrs ago    ENDOSCOPY, COLON, DIAGNOSTIC      with Dr. Shani Muniz HX CHOLECYSTECTOMY  02/2021    HX GI      ulcer    HX HEENT      sinus surg    HX RENAL TRANSPLANT      HX TRANSPLANT      kidney transplant 2002    VASCULAR SURGERY PROCEDURE UNLIST      shunt in prep for dialysis     Family History   Problem Relation Age of Onset    Colon Polyps Brother     Cancer Mother     Diabetes Father       Social History     Tobacco Use    Smoking status: Never Smoker    Smokeless tobacco: Never Used   Substance Use Topics    Alcohol use: No       Prior to Admission medications    Medication Sig Start Date End Date Taking? Authorizing Provider   montelukast (SINGULAIR) 10 mg tablet Take 1 Tablet by mouth daily. 1/21/22  Yes Arias Cavazos MD   escitalopram oxalate (LEXAPRO) 10 mg tablet Take 1 Tablet by mouth daily. 1/20/22  Yes Arias Cavazos MD   fluticasone-umeclidinium-vilanterol (Trelegy Ellipta) 100-62.5-25 mcg inhaler Take 1 Puff by inhalation daily. 1/20/22  Yes Arias Cavazos MD   hyoscyamine SL (LEVSIN/SL) 0.125 mg SL tablet 1 Tablet by SubLINGual route every four (4) hours as needed (irritable bowel). 1/17/22  Yes Arias Cavazos MD   Dexilant 60 mg CpDB capsule (delayed release) TAKE 1 CAPSULE BY MOUTH EVERY DAY 1/13/22  Yes Arias Cavazos MD   albuterol (PROVENTIL VENTOLIN) 2.5 mg /3 mL (0.083 %) nebu USE 1 VIAL VIA NEBULIZER THREE TIMES DAILY 12/23/21  Yes Arias Cavazos MD   gabapentin (NEURONTIN) 100 mg capsule 2 capsules TID 12/22/21  Yes Cara Bloch, MD   sucralfate (CARAFATE) 1 gram tablet TAKE 1 TABLET BY MOUTH FOUR TIMES DAILY 12/20/21  Yes Arias Cavazos MD   amLODIPine (NORVASC) 10 mg tablet Take 1 Tablet by mouth daily. 12/20/21  Yes Arias Cavazos MD   famotidine (PEPCID) 20 mg tablet TAKE 1 TABLET BY MOUTH TWICE DAILY 11/30/21  Yes Arias Cavazos MD   amiodarone (CORDARONE) 200 mg tablet TAKE 1 TABLET BY MOUTH EVERY DAY 9/18/21  Yes Cris Aaron MD   valGANciclovir (VALCYTE) 450 mg tablet TAKE 2 TABLETS BY MOUTH DAILY 9/18/21  Yes Cris Aaron MD   levothyroxine (SYNTHROID) 50 mcg tablet Take 1 Tablet by mouth daily.  8/24/21 Yes Sherman Rodriguez MD   carvediloL (COREG) 6.25 mg tablet Take 1 Tablet by mouth two (2) times daily (with meals). 8/24/21  Yes Sherman Rodriguez MD   losartan (COZAAR) 50 mg tablet Take 1 Tablet by mouth daily. 8/24/21  Yes Shermna Rodriguez MD   calcitRIOL (ROCALTROL) 0.25 mcg capsule Take 1 Capsule by mouth as directed. Take on non dialysis days 7/19/21  Yes Other, MD Abelardo   doxercalciferol (HECTOROL IV) 14 mcg by IntraVENous route two (2) days a week. 3/12/21 3/11/22 Yes Other, MD Abelardo   lidocaine-prilocaine (EMLA) topical cream as directed. 7/23/21  Yes Other, MD Abelardo   RenaPlex-D 800 mcg-12.5 mg -2,000 unit tab Take 1 Tablet by mouth daily. 4/15/21  Yes Other, MD Abelardo   sevelamer carbonate (RENVELA) 800 mg tab tab Take 800 mg by mouth three (3) times daily. 6/8/21  Yes Other, MD Abelardo   zolpidem (AMBIEN) 5 mg tablet Take 5 mg by mouth nightly as needed. 4/5/21  Yes Other, MD Abelardo   simvastatin (ZOCOR) 20 mg tablet TAKE 1 TABLET BY MOUTH DAILY AS DIRECTED. 2/19/21  Yes Sherman Rodriguez MD   aluminum & magnesium hydroxide-simethicone (Maalox Maximum Strength) 400-400-40 mg/5 mL suspension Take 10 mL by mouth every six (6) hours as needed for Indigestion. 9/17/20  Yes Davion Lopez MD   ESTRACE 0.01 % (0.1 mg/gram) vaginal cream INSERT 2 GRAMS INTO VAGINA EVERY MONDAY AND THURSDAY 4/11/17  Yes Mami Sparks MD   cranberry extract 425 mg cap 425 mg. 1/17/14  Yes Provider, Historical   LORazepam (ATIVAN) 0.5 mg tablet Take 0.5 mg by mouth daily. Yes Provider, Historical   fluticasone (FLONASE) 50 mcg/actuation nasal spray 1 [place].    Yes Provider, Historical   meclizine (ANTIVERT) 25 mg tablet TAKE 1 TABLET BY MOUTH THREE TIMES DAILY FOR UP TO 10 DAYS AS NEEDED FOR DIZZINESS 1/10/22   Leesa Salguero MD   EPINEPHrine [facility]) 0.3 mg/0.3 mL injection 0.3 mg. 10/1/16   Provider, Historical     Allergies   Allergen Reactions    Aspirin Rash and Unknown (comments)    Bactrim [Sulfamethoxazole-Trimethoprim] Rash and Unknown (comments)    Bromfenac Rash and Unknown (comments)    Cefepime Other (comments)     Neurological reaction    Ceftriaxone Rash    Copper Rash    Ibuprofen Rash and Unknown (comments)    Ketorolac Tromethamine Rash and Unknown (comments)    Morphine Rash and Unknown (comments)    Relafen [Nabumetone] Rash and Unknown (comments)    Rifampin Rash and Unknown (comments)    Vancomycin Rash and Unknown (comments)     Pt reports causes itching, takes benadryl prior to use. Pt denies anaphylaxis. Requires benadryl with each vancomycin dose        Review of Systems:  14 point ros reviewed with patient reportde negative except as mentioned in HPI    Objective:     Vitals:  Visit Vitals  BP (!) 155/80   Pulse 80   Temp 97.8 °F (36.6 °C)   Resp 21   Ht 5' 1\" (1.549 m)   Wt 73.5 kg (162 lb)   SpO2 98%   Breastfeeding No   BMI 30.61 kg/m²       Physical Exam:  General: appears stated age, awake, allert, cooperative, no acute distress. Head:  Normocephalic, without obvious abnormality, atraumatic. Eyes:  Conjunctivae/corneas clear. Pupils equal, round, reactive to light. Extraocular movements intact. Lungs: diminished to auscultation bilaterally,+ve expiratory wheezing,   Chest wall: No tenderness or deformity. No accessory muscle use  Heart:  Regular rate and rhythm, S1, S2 normal, no murmur, click, rub, or gallop. Abdomen: Soft, non-tender. Bowel sounds active. No palpable masses  Extremities: Extremities normal, atraumatic, no cyanosis or peripheral edema. Pulses: 2+ and Symmetric all extremities.   Skin: not pale or jaundiced, overall Skin color, texture, turgor normal.   Lymph nodes: Cervical nodes normal.  Neurologic: Awake and oriented, x 4. Non focal.       Labs:  Recent Results (from the past 24 hour(s))   CBC WITH AUTOMATED DIFF    Collection Time: 02/02/22  5:39 AM   Result Value Ref Range    WBC 6.1 4.6 - 13.2 K/uL    RBC 2.93 (L) 4.20 - 5.30 M/uL    HGB 10.4 (L) 12.0 - 16.0 g/dL    HCT 33.8 (L) 35.0 - 45.0 %    .4 (H) 78.0 - 100.0 FL    MCH 35.5 (H) 24.0 - 34.0 PG    MCHC 30.8 (L) 31.0 - 37.0 g/dL    RDW 16.1 (H) 11.6 - 14.5 %    PLATELET 802 656 - 404 K/uL    MPV 10.4 9.2 - 11.8 FL    NRBC 0.0 0.0  WBC    ABSOLUTE NRBC 0.00 0.00 - 0.01 K/uL    NEUTROPHILS 75 (H) 40 - 73 %    LYMPHOCYTES 20 (L) 21 - 52 %    MONOCYTES 2 (L) 3 - 10 %    EOSINOPHILS 1 0 - 5 %    BASOPHILS 1 0 - 2 %    IMMATURE GRANULOCYTES 1 (H) 0 - 0.5 %    ABS. NEUTROPHILS 4.5 1.8 - 8.0 K/UL    ABS. LYMPHOCYTES 1.2 0.9 - 3.6 K/UL    ABS. MONOCYTES 0.1 0.05 - 1.2 K/UL    ABS. EOSINOPHILS 0.1 0.0 - 0.4 K/UL    ABS. BASOPHILS 0.1 0.0 - 0.1 K/UL    ABS. IMM. GRANS. 0.1 (H) 0.00 - 0.04 K/UL    DF Manual      RBC COMMENTS Anisocytosis  1+        RBC COMMENTS Macrocytosis  1+       METABOLIC PANEL, BASIC    Collection Time: 02/02/22  5:39 AM   Result Value Ref Range    Sodium 137 135 - 145 mmol/L    Potassium 6.1 (HH) 3.2 - 5.1 mmol/L    Chloride 98 94 - 111 mmol/L    CO2 27 21 - 33 mmol/L    Anion gap 12 mmol/L    Glucose 93 70 - 110 mg/dL    BUN 40 (H) 9 - 21 mg/dL    Creatinine 6.90 (H) 0.70 - 1.20 mg/dL    BUN/Creatinine ratio 6      GFR est AA 7 ml/min/1.73m2    GFR est non-AA 6 ml/min/1.73m2    Calcium 8.2 (L) 8.5 - 10.5 mg/dL   COVID-19 RAPID TEST    Collection Time: 02/02/22  7:52 AM   Result Value Ref Range    Specimen source Swab      COVID-19 rapid test Not Detected Not Detected         Imaging:  XR CHEST PORT    Result Date: 2/2/2022  EXAM: XR CHEST PORT CLINICAL INDICATION/HISTORY: wheezing/ shortness of breath -Additional: None COMPARISON: 12/24/2021 TECHNIQUE: Portable frontal view of the chest _______________ FINDINGS: SUPPORT DEVICES: None. HEART AND MEDIASTINUM: Stable cardiomediastinal silhouette. LUNGS AND PLEURAL SPACES: No dense consolidation, large effusion or pneumothorax. _______________     No acute cardiopulmonary abnormality.        Assessment & Plan:     #1: Shortness of breath:  -suspect mainly due to chf, it was thought that her HD plan should be 3x weekly from her last hospitalization I december. Today, her her cxr is negative, her bnp is 1539, she had reported weight gain and +ve orthopnea.  -pt states she was told by her cardiologist to quit taking eliquis due to cross interractions with epidural injections for her chronic back pain. -hx esrd on hd 2 x/week,    -admit to inpatient, tele  -nephrology-Dr cintron consulted in ed for HD    #2: Hyperkalemia:  -presents with a K level of 6.1,  -give kayexealge 15gm pending hemodialysis plan per nephro    #3: Asthma exacerbation:  -start solumedrol q 8hr, taper as needed  -albuterol q 4hr  -continue home trelegy and montelukast    #4: Hypertension:  -chronic issue, fairly stable, continue carvedilol and amlodipine. Hydralazine prn     #6: Hyperlipidemia: chronic issue, on simvastatin. #7: Hypothyroidism: chronic issue, on levothyroxine. #8:  ESRD/HD:  -hemodialysis per nephrology.   #9: Atrial Fibrillation:  -continue amiodarone, eliquis stopped by her cardiologist      VTE prophylaxis: SCD  Code status: Full code  Plan of care discussed with patient at beside

## 2022-02-02 NOTE — PROGRESS NOTES
Problem: Pressure Injury - Risk of  Goal: *Prevention of pressure injury  Description: Document Quinn Scale and appropriate interventions in the flowsheet.   Outcome: Progressing Towards Goal  Note: Pressure Injury Interventions:  Sensory Interventions: Assess changes in LOC,Assess need for specialty bed,Discuss PT/OT consult with provider,Float heels,Keep linens dry and wrinkle-free    Moisture Interventions: Absorbent underpads,Apply protective barrier, creams and emollients,Limit adult briefs    Activity Interventions: Assess need for specialty bed,Chair cushion,Increase time out of bed,Pressure redistribution bed/mattress(bed type)    Mobility Interventions: Assess need for specialty bed,Chair cushion,Float heels,HOB 30 degrees or less    Nutrition Interventions: Document food/fluid/supplement intake,Discuss nutritional consult with provider,Offer support with meals,snacks and hydration    Friction and Shear Interventions: Apply protective barrier, creams and emollients,HOB 30 degrees or less                Problem: Patient Education: Go to Patient Education Activity  Goal: Patient/Family Education  Outcome: Progressing Towards Goal

## 2022-02-02 NOTE — ED NOTES
TRANSFER - OUT REPORT:    Verbal report given to JONAH Marroquin(name) on Prakash  being transferred to [address] [place] [address](unit) for routine progression of care       Report consisted of patients Situation, Background, Assessment and   Recommendations(SBAR). Information from the following report(s) SBAR, ED Summary, MAR, Recent Results and Cardiac Rhythm NSR was reviewed with the receiving nurse. Lines:   Peripheral IV 02/02/22 Right Antecubital (Active)   Site Assessment Clean, dry, & intact 02/02/22 0544   Phlebitis Assessment 0 02/02/22 0544   Infiltration Assessment 0 02/02/22 0544   Dressing Status Clean, dry, & intact 02/02/22 0544   Dressing Type Transparent 02/02/22 0544   Hub Color/Line Status Pink 02/02/22 0544        Opportunity for questions and clarification was provided.       Patient transported with:   Monitor  Registered Nurse

## 2022-02-02 NOTE — DIALYSIS
Hemodialysis / [phone]    Vitals Pre Post Assessment Pre Post   BP BP: (!) 151/74 (02/02/22 1400) 126/77 LOC A & O x 4 A & O x 4   HR Pulse (Heart Rate): 77 (02/02/22 1400) 80 Lungs Wheezing,coarse States no difficulty breathing, wheezes / respiratory therapist present and rounding during treatment   Resp Resp Rate: 21 (02/02/22 1400) 18 Cardiac Regular rate,rhythm Regular rate, rhythm   Temp Temp: 98 °F (36.7 °C) (02/02/22 1400) 98.2 Skin No uncovered wounds noted No uncovered wounds noted   Weight  obtain by hospital staff  Edema trace decreased   Tele status Obtain by hospital staff  Pain Pain Intensity 1: 0 (02/02/22 1400) NO PAIN / completely resolved with morphine     Orders   Duration: Start: 1500 End: 1800 Total: 3   Dialyzer:  revaclear   K Bath:  2   Ca Bath:  2.5   Na:  138   Bicarb:  37   Target Fluid Removal:  2000     Access   Type & Location: LUE AVF, 15 gg needles used without difficulty / no s/s of infection / bruit and thrill noted   Comments:                                        Labs   HBsAg (Antigen) / date:          1/3/22 negative                                     HBsAb (Antibody) / date: Susceptible 11/1/21   Source: Clinic results   Obtained/Reviewed  Critical Results Called HGB   Date Value Ref Range Status   02/02/2022 10.4 (L) 12.0 - 16.0 g/dL Final     Potassium   Date Value Ref Range Status   02/02/2022 6.1 (HH) 3.2 - 5.1 mmol/L Final     Comment:     CALLED TO AND READ BACK BY CRITICAL RESULTS CALLED TO ER TO DEANGELO Blackburn BY SJREMA [id number]. PLEASE NOTE SPECIMEN STILL HAD 1+ HEMOLYSIS. PLEASE RECOLLECT ID CLINICALLY INDICATED. SECOND DRAW/VERY DIFICULT STICK.   SJB     Calcium   Date Value Ref Range Status   02/02/2022 8.2 (L) 8.5 - 10.5 mg/dL Final     BUN   Date Value Ref Range Status   02/02/2022 40 (H) 9 - 21 mg/dL Final     Creatinine   Date Value Ref Range Status   02/02/2022 6.90 (H) 0.70 - 1.20 mg/dL Final        Meds Given   Name Dose Route   NA Adequacy / Fluid    Total Liters Process: 67   Net Fluid Removed: 2000      Comments   Time Out Done:   (Time) 1445   Admitting Diagnosis: SOB, fluid overload   Consent obtained/signed:  yes   Machine / RO #  [id number], [id number]   Primary Nurse Rpt Pre: MAUREEN Manzanares RN and MAUREEN Ya LPN   Primary Nurse Rpt PostCharls Sayer LPN   Pt Education: Importance of treatment adherence   Care Plan: Continue with dialysis treatments as ordered   Pts outpatient clinic: [facility]     Tx Summary   Comments:            Chest pain 9/10 present on arrival, prior to beginning / states \"sharp\", \"mostly when coughing, breathing\" , 'across entire chest\" / primary nurse Anna Marie Pierson LPN gave tylenol and patient states no relief, requests analgesic IV  And MAUREEN Ya notfied and called back to say NP Benigno would like to give no IV analgesic and NP Benigno would like respiratory therapist to see patient again         / patient given morphine for chest pain that would not resolve / no pain thereafter morphine was given / no distress at all thereafter / no complaints / rested and watched tv much of treatment / post treatment, all possible blood returned / hemostasis

## 2022-02-02 NOTE — ED PROVIDER NOTES
EMERGENCY DEPARTMENT HISTORY AND PHYSICAL EXAM      Date: 2/2/2022  Patient Name: Mona Baxter    History of Presenting Illness     Chief Complaint   Patient presents with    Respiratory Distress       History Provided By: Patient    HPI: Mona Baxter, 61 y.o. female with a past medical history significant hypertension, asthma and End-stage renal disease, on dialysis presents to the ED with cc of shortness of breath, asthma exacerbation. Her asthma history is fairly significant, she was admitted to this facility a few weeks ago, discharged to home, followed with her PCP, was again getting symptoms, was given oral steroid taper, this finished 2 days ago. Prednisone provided some relief but not resolution, symptoms have worsened over the last 2 days. She is using her nebulizer 3 times daily, she has controller inhalers, she is significantly short of breath and wheezing. She goes to dialysis, has been requiring supplemental oxygen while at dialysis. There are no other complaints, changes, or physical findings at this time. PCP: Amando Washburn MD    No current facility-administered medications on file prior to encounter. Current Outpatient Medications on File Prior to Encounter   Medication Sig Dispense Refill    montelukast (SINGULAIR) 10 mg tablet Take 1 Tablet by mouth daily. 90 Tablet 0    escitalopram oxalate (LEXAPRO) 10 mg tablet Take 1 Tablet by mouth daily. 90 Tablet 0    fluticasone-umeclidinium-vilanterol (Trelegy Ellipta) 100-62.5-25 mcg inhaler Take 1 Puff by inhalation daily. 60 Each 5    hyoscyamine SL (LEVSIN/SL) 0.125 mg SL tablet 1 Tablet by SubLINGual route every four (4) hours as needed (irritable bowel).  30 Tablet 5    Dexilant 60 mg CpDB capsule (delayed release) TAKE 1 CAPSULE BY MOUTH EVERY DAY 30 Capsule 5    albuterol (PROVENTIL VENTOLIN) 2.5 mg /3 mL (0.083 %) nebu USE 1 VIAL VIA NEBULIZER THREE TIMES DAILY 90 mL 3    gabapentin (NEURONTIN) 100 mg capsule 2 capsules  Capsule 0    sucralfate (CARAFATE) 1 gram tablet TAKE 1 TABLET BY MOUTH FOUR TIMES DAILY 360 Tablet 0    amLODIPine (NORVASC) 10 mg tablet Take 1 Tablet by mouth daily. 30 Tablet 1    famotidine (PEPCID) 20 mg tablet TAKE 1 TABLET BY MOUTH TWICE DAILY 30 Tablet 1    amiodarone (CORDARONE) 200 mg tablet TAKE 1 TABLET BY MOUTH EVERY DAY 90 Tablet 2    valGANciclovir (VALCYTE) 450 mg tablet TAKE 2 TABLETS BY MOUTH DAILY 90 Tablet 5    levothyroxine (SYNTHROID) 50 mcg tablet Take 1 Tablet by mouth daily. 90 Tablet 3    carvediloL (COREG) 6.25 mg tablet Take 1 Tablet by mouth two (2) times daily (with meals). 180 Tablet 2    losartan (COZAAR) 50 mg tablet Take 1 Tablet by mouth daily. 90 Tablet 3    calcitRIOL (ROCALTROL) 0.25 mcg capsule Take 1 Capsule by mouth as directed. Take on non dialysis days      doxercalciferol (HECTOROL IV) 14 mcg by IntraVENous route two (2) days a week.  lidocaine-prilocaine (EMLA) topical cream as directed.  RenaPlex-D 800 mcg-12.5 mg -2,000 unit tab Take 1 Tablet by mouth daily.  sevelamer carbonate (RENVELA) 800 mg tab tab Take 800 mg by mouth three (3) times daily.  zolpidem (AMBIEN) 5 mg tablet Take 5 mg by mouth nightly as needed.  simvastatin (ZOCOR) 20 mg tablet TAKE 1 TABLET BY MOUTH DAILY AS DIRECTED. 90 Tab 1    aluminum & magnesium hydroxide-simethicone (Maalox Maximum Strength) 400-400-40 mg/5 mL suspension Take 10 mL by mouth every six (6) hours as needed for Indigestion. 250 mL 0    ESTRACE 0.01 % (0.1 mg/gram) vaginal cream INSERT 2 GRAMS INTO VAGINA EVERY MONDAY AND THURSDAY 42.5 g 5    cranberry extract 425 mg cap 425 mg.  LORazepam (ATIVAN) 0.5 mg tablet Take 0.5 mg by mouth daily.  fluticasone (FLONASE) 50 mcg/actuation nasal spray 1 Spray.       meclizine (ANTIVERT) 25 mg tablet TAKE 1 TABLET BY MOUTH THREE TIMES DAILY FOR UP TO 10 DAYS AS NEEDED FOR DIZZINESS 21 Tablet 0    [DISCONTINUED] apixaban (Eliquis DVT-PE Treat 30D Start) 5 mg (74 tabs) starter pack Take 5 mg by mouth twice a day for 7 days Followed by 2.5 mg by mouth twice a day 1 Dose Pack 0    [DISCONTINUED] dicyclomine (BENTYL) 10 mg capsule TAKE 1 CAPSULE BY MOUTH FOUR TIMES DAILY 120 Cap 1    EPINEPHrine (EPIPEN) 0.3 mg/0.3 mL injection 0.3 mg. Past History     Past Medical History:  Past Medical History:   Diagnosis Date    Anemia NEC     Arrhythmia     Asthma     Asthma     Burning with urination     frequent uti    Chronic kidney disease     dialysis    CMV (cytomegalovirus) antibody positive     Dialysis patient ([facility] [address]     M/W/F    Dyspepsia and other specified disorders of function of stomach     stomach ulcer    Essential hypertension     GERD (gastroesophageal reflux disease)     Hypercholesteremia     Hypertension     Migraine     Obesity     Renal cyst 2002    kidney transplant     Ulcer        Past Surgical History:  Past Surgical History:   Procedure Laterality Date    COLONOSCOPY      Dr Fatemeh Mcclellan  5yrs ago    ENDOSCOPY VISIT-OUTPATIENT      Dr Fatemeh Mcclellan  5 yrs ago    ENDOSCOPY, COLON, DIAGNOSTIC      with Dr. Cary Liter HX [address] [place] [address]  02/2021    HX GI      ulcer    HX HEENT      sinus surg    HX RENAL TRANSPLANT      HX TRANSPLANT      kidney transplant 2002    VASCULAR SURGERY PROCEDURE UNLIST      shunt in prep for dialysis       Family History:  Family History   Problem Relation Age of Onset    Colon Polyps Brother     Cancer Mother     Diabetes Father        Social History:  Social History     Tobacco Use    Smoking status: Never Smoker    Smokeless tobacco: Never Used   Vaping Use    Vaping Use: Never used   Substance Use Topics    Alcohol use: No    Drug use: No       Allergies:   Allergies   Allergen Reactions    Aspirin Rash and Unknown (comments)    Bactrim [Sulfamethoxazole-Trimethoprim] Rash and Unknown (comments)    Bromfenac Rash and Unknown (comments)    Cefepime Other (comments) Neurological reaction    Ceftriaxone Rash    Copper Rash    Ibuprofen Rash and Unknown (comments)    Ketorolac Tromethamine Rash and Unknown (comments)    Morphine Rash and Unknown (comments)    Relafen [Nabumetone] Rash and Unknown (comments)    Rifampin Rash and Unknown (comments)    Vancomycin Rash and Unknown (comments)     Pt reports causes itching, takes benadryl prior to use. Pt denies anaphylaxis. Requires benadryl with each vancomycin dose         Review of Systems     Review of Systems   Constitutional: Negative for chills and fever. HENT: Negative for ear pain, rhinorrhea, sneezing and sore throat. Respiratory: Positive for cough, shortness of breath and wheezing. Cardiovascular: Negative for chest pain. Gastrointestinal: Negative for abdominal pain, constipation, diarrhea, nausea and vomiting. Genitourinary: Negative for dysuria, flank pain, frequency, hematuria and urgency. Musculoskeletal: Negative for arthralgias, back pain, joint swelling and neck pain. Skin: Negative for rash. Neurological: Negative for dizziness, weakness, light-headedness, numbness and headaches. Hematological: Negative for adenopathy. Psychiatric/Behavioral: Negative for agitation, behavioral problems and self-injury. All other systems reviewed and are negative. Physical Exam     Physical Exam  Vitals and nursing note reviewed. Constitutional:       Appearance: Normal appearance. She is ill-appearing. She is not toxic-appearing. HENT:      Head: Normocephalic and atraumatic. Right Ear: Tympanic membrane normal.      Left Ear: Tympanic membrane normal.      Nose: No congestion or rhinorrhea. Mouth/Throat:      Mouth: Mucous membranes are moist.   Eyes:      Extraocular Movements: Extraocular movements intact. Pupils: Pupils are equal, round, and reactive to light. Cardiovascular:      Rate and Rhythm: Normal rate and regular rhythm.       Heart sounds: Normal heart sounds. Pulmonary:      Effort: Respiratory distress present. Breath sounds: Wheezing (Inspiratory and expiratory throughout) and rhonchi present. No rales. Abdominal:      General: Bowel sounds are normal. There is no distension. Palpations: Abdomen is soft. Tenderness: There is no abdominal tenderness. Musculoskeletal:         General: No swelling or tenderness. Normal range of motion. Cervical back: Normal range of motion and neck supple. Skin:     General: Skin is warm and dry. Neurological:      General: No focal deficit present. Mental Status: She is alert and oriented to person, place, and time. Psychiatric:         Mood and Affect: Mood normal.         Behavior: Behavior normal.         Lab and Diagnostic Study Results     Labs -   No results found for this or any previous visit (from the past 12 hour(s)). Radiologic Studies -   @lastxrresult@  CT Results  (Last 48 hours)    None        CXR Results  (Last 48 hours)    None            Medical Decision Making   - I am the first provider for this patient. - I reviewed the vital signs, available nursing notes, past medical history, past surgical history, family history and social history. - Initial assessment performed. The patients presenting problems have been discussed, and they are in agreement with the care plan formulated and outlined with them. I have encouraged them to ask questions as they arise throughout their visit. Vital Signs-Reviewed the patient's vital signs.   Patient Vitals for the past 12 hrs:   Temp Pulse Resp BP SpO2   02/02/22 0503 -- -- -- -- 97 %   02/02/22 0450 98.7 °F (37.1 °C) 99 28 (!) 149/95 97 %       Records Reviewed: Nursing Notes and Old Medical Records    The patient presents with shortness of breath with a differential diagnosis of asthma exacerbation, pneumonia, viral upper respiratory infection, bacterial upper respiratory infection, CHF      ED Course:     ED Course as of 02/02/22 0601   Wed Feb 02, 2022   0550 This x-ray has increased vascular markings consistent with fluid overload [DC]      ED Course User Index  [DC] Taniya Burns DO       Provider Notes (Medical Decision Making):   Patient is seen and evaluated shortly after arrival, she does have increased work of breathing, is ill-appearing, does not look critically ill or toxic. There is audible wheeze, she is able to put together most of a sentence but cannot speak freely and without difficulty. Physical exam is as noted above. Differential diagnosis reviewed and discussed with patient, questions answered. I am going to get chest x-ray, give her IV Solu-Medrol, continuous nebulizer treatment. Anticipate admission for failure of outpatient therapy for her asthma. Upon further review of prior hospital discharge on 12/25/2021, it was felt that her shortness of breath was more due to fluid overload than asthma, she had exceptional improvement in symptoms with an additional round of hemodialysis. She goes to hemodialysis twice weekly, did discuss 3 times weekly with her nephrologist, was told that she did not need 3 times a week dialysis as she is still making urine. Reassessed, patient is improved, symptoms are not resolved, she does continue to wheeze. She was given Lasix, has not yet produced urine. She has a history of renal transplant. Case briefly discussed with hospitalist, Mckenna Potts PA-C to consider hospital admission. She states that [facility] renal team does not want transplant patients admitted to this facility. Await completion of labs, will turn patient over to Dr. Amparo Mart during the day shift to arrange disposition. Addendum. Patient subsequently admitted to Dr. Seb Alonso. Nephrology consulted.   MDM       Procedures   Medical Decision Makingedical Decision Making  Performed by: Abdirashid Doshi DO  PROCEDURES:  Procedures       Disposition     Patient placed in observation, telemetry to Dr. Chase Barbosa. DISCHARGE PLAN:  1. Current Discharge Medication List      CONTINUE these medications which have NOT CHANGED    Details   montelukast (SINGULAIR) 10 mg tablet Take 1 Tablet by mouth daily. Qty: 90 Tablet, Refills: 0      escitalopram oxalate (LEXAPRO) 10 mg tablet Take 1 Tablet by mouth daily. Qty: 90 Tablet, Refills: 0      fluticasone-umeclidinium-vilanterol (Trelegy Ellipta) 100-62.5-25 mcg inhaler Take 1 Puff by inhalation daily. Qty: 60 Each, Refills: 5      hyoscyamine SL (LEVSIN/SL) 0.125 mg SL tablet 1 Tablet by SubLINGual route every four (4) hours as needed (irritable bowel). Qty: 30 Tablet, Refills: 5    Associated Diagnoses: Gastroesophageal reflux disease with esophagitis without hemorrhage      Dexilant 60 mg CpDB capsule (delayed release) TAKE 1 CAPSULE BY MOUTH EVERY DAY  Qty: 30 Capsule, Refills: 5    Associated Diagnoses: Gastroesophageal reflux disease with esophagitis without hemorrhage      albuterol (PROVENTIL VENTOLIN) 2.5 mg /3 mL (0.083 %) nebu USE 1 VIAL VIA NEBULIZER THREE TIMES DAILY  Qty: 90 mL, Refills: 3    Associated Diagnoses: Mild intermittent asthma without complication      gabapentin (NEURONTIN) 100 mg capsule 2 capsules TID  Qty: 540 Capsule, Refills: 0    Associated Diagnoses: Facet arthropathy; DDD (degenerative disc disease), lumbar; Spinal stenosis of lumbar region, unspecified whether neurogenic claudication present; Lumbar neuritis      sucralfate (CARAFATE) 1 gram tablet TAKE 1 TABLET BY MOUTH FOUR TIMES DAILY  Qty: 360 Tablet, Refills: 0      amLODIPine (NORVASC) 10 mg tablet Take 1 Tablet by mouth daily.   Qty: 30 Tablet, Refills: 1    Associated Diagnoses: Hypertension, unspecified type      famotidine (PEPCID) 20 mg tablet TAKE 1 TABLET BY MOUTH TWICE DAILY  Qty: 30 Tablet, Refills: 1    Associated Diagnoses: Gastroesophageal reflux disease without esophagitis      amiodarone (CORDARONE) 200 mg tablet TAKE 1 TABLET BY MOUTH EVERY DAY  Qty: 90 Tablet, Refills: 2      valGANciclovir (VALCYTE) 450 mg tablet TAKE 2 TABLETS BY MOUTH DAILY  Qty: 90 Tablet, Refills: 5    Associated Diagnoses: ESRD (end stage renal disease) on dialysis (HCC)      levothyroxine (SYNTHROID) 50 mcg tablet Take 1 Tablet by mouth daily. Qty: 90 Tablet, Refills: 3      carvediloL (COREG) 6.25 mg tablet Take 1 Tablet by mouth two (2) times daily (with meals). Qty: 180 Tablet, Refills: 2    Associated Diagnoses: Hypertension, unspecified type      losartan (COZAAR) 50 mg tablet Take 1 Tablet by mouth daily. Qty: 90 Tablet, Refills: 3    Associated Diagnoses: Hypertension, unspecified type      calcitRIOL (ROCALTROL) 0.25 mcg capsule Take 1 Capsule by mouth as directed. Take on non dialysis days      doxercalciferol (HECTOROL IV) 14 mcg by IntraVENous route two (2) days a week.      lidocaine-prilocaine (EMLA) topical cream as directed. RenaPlex-D 800 mcg-12.5 mg -2,000 unit tab Take 1 Tablet by mouth daily. sevelamer carbonate (RENVELA) 800 mg tab tab Take 800 mg by mouth three (3) times daily. zolpidem (AMBIEN) 5 mg tablet Take 5 mg by mouth nightly as needed. simvastatin (ZOCOR) 20 mg tablet TAKE 1 TABLET BY MOUTH DAILY AS DIRECTED. Qty: 90 Tab, Refills: 1      aluminum & magnesium hydroxide-simethicone (Maalox Maximum Strength) 400-400-40 mg/5 mL suspension Take 10 mL by mouth every six (6) hours as needed for Indigestion. Qty: 250 mL, Refills: 0      ESTRACE 0.01 % (0.1 mg/gram) vaginal cream INSERT 2 GRAMS INTO VAGINA EVERY MONDAY AND THURSDAY  Qty: 42.5 g, Refills: 5      cranberry extract 425 mg cap 425 mg. LORazepam (ATIVAN) 0.5 mg tablet Take 0.5 mg by mouth daily. fluticasone (FLONASE) 50 mcg/actuation nasal spray 1 Spray.       meclizine (ANTIVERT) 25 mg tablet TAKE 1 TABLET BY MOUTH THREE TIMES DAILY FOR UP TO 10 DAYS AS NEEDED FOR DIZZINESS  Qty: 21 Tablet, Refills: 0    Associated Diagnoses: Dizziness      EPINEPHrine (EPIPEN) 0.3 mg/0.3 mL injection 0.3 mg.           2.   Follow-up Information    None       3. Return to ED if worse   4. Current Discharge Medication List            Diagnosis     Clinical Impression: 1) Volume Overload  2) hyperkalemia  3) ESRD         Attestations:    Tangela Giron, DO    Please note that this dictation was completed with [employer], the computer voice recognition software. Quite often unanticipated grammatical, syntax, homophones, and other interpretive errors are inadvertently transcribed by the computer software. Please disregard these errors. Please excuse any errors that have escaped final proofreading. Thank you.

## 2022-02-02 NOTE — ED NOTES
Bedside and Verbal shift change report given to Jackelin (oncoming nurse) by Cehn Murrieta (offgoing nurse). Report included the following information ED Summary.

## 2022-02-02 NOTE — PROGRESS NOTES
Patient assessed saturation was 98% respiratory rate 24. Patient was wheezing bilaterially. Patient in covid rule out. Patient is a regular patient that always wheezes. Patient does have increased respiratory rate but is stable at this time.

## 2022-02-03 VITALS
DIASTOLIC BLOOD PRESSURE: 66 MMHG | BODY MASS INDEX: 30.49 KG/M2 | HEIGHT: 61 IN | SYSTOLIC BLOOD PRESSURE: 158 MMHG | HEART RATE: 84 BPM | WEIGHT: 161.5 LBS | RESPIRATION RATE: 20 BRPM | TEMPERATURE: 97.6 F | OXYGEN SATURATION: 99 %

## 2022-02-03 LAB
ALBUMIN SERPL-MCNC: 4.1 G/DL (ref 3.5–4.7)
ANION GAP SERPL CALC-SCNC: 15 MMOL/L
ANION GAP SERPL CALC-SCNC: 15 MMOL/L
BASOPHILS # BLD: 0 K/UL (ref 0–0.1)
BASOPHILS NFR BLD: 0 % (ref 0–2)
BUN SERPL-MCNC: 36 MG/DL (ref 9–21)
BUN SERPL-MCNC: 36 MG/DL (ref 9–21)
BUN/CREAT SERPL: 7
BUN/CREAT SERPL: 7
CA-I BLD-MCNC: 8.4 MG/DL (ref 8.5–10.5)
CA-I BLD-MCNC: 8.4 MG/DL (ref 8.5–10.5)
CHLORIDE SERPL-SCNC: 93 MMOL/L (ref 94–111)
CHLORIDE SERPL-SCNC: 94 MMOL/L (ref 94–111)
CO2 SERPL-SCNC: 27 MMOL/L (ref 21–33)
CO2 SERPL-SCNC: 27 MMOL/L (ref 21–33)
CREAT SERPL-MCNC: 4.9 MG/DL (ref 0.7–1.2)
CREAT SERPL-MCNC: 5 MG/DL (ref 0.7–1.2)
DIFFERENTIAL METHOD BLD: ABNORMAL
EOSINOPHIL # BLD: 0 K/UL (ref 0–0.4)
EOSINOPHIL NFR BLD: 0 % (ref 0–5)
ERYTHROCYTE [DISTWIDTH] IN BLOOD BY AUTOMATED COUNT: 16 % (ref 11.6–14.5)
GLUCOSE SERPL-MCNC: 165 MG/DL (ref 70–110)
GLUCOSE SERPL-MCNC: 168 MG/DL (ref 70–110)
HCT VFR BLD AUTO: 32.8 % (ref 35–45)
HGB BLD-MCNC: 10 G/DL (ref 12–16)
IMM GRANULOCYTES # BLD AUTO: 0.1 K/UL (ref 0–0.04)
IMM GRANULOCYTES NFR BLD AUTO: 1 % (ref 0–0.5)
LYMPHOCYTES # BLD: 0.5 K/UL (ref 0.9–3.6)
LYMPHOCYTES NFR BLD: 6 % (ref 21–52)
MCH RBC QN AUTO: 35 PG (ref 24–34)
MCHC RBC AUTO-ENTMCNC: 30.5 G/DL (ref 31–37)
MCV RBC AUTO: 114.7 FL (ref 78–100)
MONOCYTES # BLD: 0.1 K/UL (ref 0.05–1.2)
MONOCYTES NFR BLD: 1 % (ref 3–10)
NEUTS SEG # BLD: 7.2 K/UL (ref 1.8–8)
NEUTS SEG NFR BLD: 92 % (ref 40–73)
NRBC # BLD: 0.02 K/UL (ref 0–0.01)
NRBC BLD-RTO: 0.3 PER 100 WBC
PHOSPHATE SERPL-MCNC: 3.8 MG/DL (ref 2.5–4.5)
PLATELET # BLD AUTO: 167 K/UL (ref 135–420)
PMV BLD AUTO: 12.6 FL (ref 9.2–11.8)
POTASSIUM SERPL-SCNC: 4 MMOL/L (ref 3.2–5.1)
POTASSIUM SERPL-SCNC: 4.1 MMOL/L (ref 3.2–5.1)
RBC # BLD AUTO: 2.86 M/UL (ref 4.2–5.3)
RBC MORPH BLD: ABNORMAL
RBC MORPH BLD: ABNORMAL
SODIUM SERPL-SCNC: 135 MMOL/L (ref 135–145)
SODIUM SERPL-SCNC: 136 MMOL/L (ref 135–145)
WBC # BLD AUTO: 7.9 K/UL (ref 4.6–13.2)

## 2022-02-03 PROCEDURE — G0378 HOSPITAL OBSERVATION PER HR: HCPCS

## 2022-02-03 PROCEDURE — 80048 BASIC METABOLIC PNL TOTAL CA: CPT

## 2022-02-03 PROCEDURE — 80069 RENAL FUNCTION PANEL: CPT

## 2022-02-03 PROCEDURE — 94618 PULMONARY STRESS TESTING: CPT

## 2022-02-03 PROCEDURE — 74011250637 HC RX REV CODE- 250/637: Performed by: NURSE PRACTITIONER

## 2022-02-03 PROCEDURE — 96376 TX/PRO/DX INJ SAME DRUG ADON: CPT

## 2022-02-03 PROCEDURE — 74011250637 HC RX REV CODE- 250/637: Performed by: INTERNAL MEDICINE

## 2022-02-03 PROCEDURE — 74011250636 HC RX REV CODE- 250/636: Performed by: NURSE PRACTITIONER

## 2022-02-03 PROCEDURE — 36415 COLL VENOUS BLD VENIPUNCTURE: CPT

## 2022-02-03 PROCEDURE — 94640 AIRWAY INHALATION TREATMENT: CPT

## 2022-02-03 PROCEDURE — 85025 COMPLETE CBC W/AUTO DIFF WBC: CPT

## 2022-02-03 RX ORDER — CODEINE PHOSPHATE AND GUAIFENESIN 10; 100 MG/5ML; MG/5ML
5 SOLUTION ORAL
Status: DISCONTINUED | OUTPATIENT
Start: 2022-02-03 | End: 2022-02-03 | Stop reason: HOSPADM

## 2022-02-03 RX ADMIN — ALBUTEROL SULFATE 2 PUFF: 108 AEROSOL, METERED RESPIRATORY (INHALATION) at 04:50

## 2022-02-03 RX ADMIN — ALBUTEROL SULFATE 2 PUFF: 108 AEROSOL, METERED RESPIRATORY (INHALATION) at 08:00

## 2022-02-03 RX ADMIN — AMLODIPINE BESYLATE 10 MG: 5 TABLET ORAL at 09:24

## 2022-02-03 RX ADMIN — SEVELAMER CARBONATE 800 MG: 800 TABLET, FILM COATED ORAL at 09:25

## 2022-02-03 RX ADMIN — METHYLPREDNISOLONE SODIUM SUCCINATE 40 MG: 40 INJECTION, POWDER, FOR SOLUTION INTRAMUSCULAR; INTRAVENOUS at 06:35

## 2022-02-03 RX ADMIN — FLUTICASONE PROPIONATE 1 SPRAY: 50 SPRAY, METERED NASAL at 09:26

## 2022-02-03 RX ADMIN — GABAPENTIN 100 MG: 100 CAPSULE ORAL at 09:25

## 2022-02-03 RX ADMIN — ACETAMINOPHEN 650 MG: 325 TABLET ORAL at 04:04

## 2022-02-03 RX ADMIN — GUAIFENESIN AND CODEINE PHOSPHATE 5 ML: 10; 100 LIQUID ORAL at 02:54

## 2022-02-03 RX ADMIN — PANTOPRAZOLE SODIUM 40 MG: 40 TABLET, DELAYED RELEASE ORAL at 09:26

## 2022-02-03 RX ADMIN — AMIODARONE HYDROCHLORIDE 200 MG: 200 TABLET ORAL at 09:26

## 2022-02-03 RX ADMIN — Medication 1 TABLET: at 09:00

## 2022-02-03 RX ADMIN — LOSARTAN POTASSIUM 50 MG: 25 TABLET, FILM COATED ORAL at 09:25

## 2022-02-03 RX ADMIN — SUCRALFATE 1 G: 1 TABLET ORAL at 06:36

## 2022-02-03 RX ADMIN — MONTELUKAST 10 MG: 10 TABLET, FILM COATED ORAL at 09:26

## 2022-02-03 RX ADMIN — LEVOTHYROXINE SODIUM 50 MCG: 0.05 TABLET ORAL at 09:25

## 2022-02-03 RX ADMIN — CALCITRIOL CAPSULES 0.25 MCG 0.25 MCG: 0.25 CAPSULE ORAL at 09:25

## 2022-02-03 RX ADMIN — FAMOTIDINE 20 MG: 20 TABLET, FILM COATED ORAL at 09:25

## 2022-02-03 RX ADMIN — ESCITALOPRAM OXALATE 10 MG: 10 TABLET ORAL at 09:25

## 2022-02-03 NOTE — PROGRESS NOTES
Head to Toe assessment was completed. Bruit and Thrill was present on Fistula. Wheezing upon exhalation on all lobes. ADL's complete. Ate 50% breakfast. Patient resting comfortably in bed.

## 2022-02-03 NOTE — PROGRESS NOTES
Received report from nurse. Patient lying supine in bed. Morning vitals were obtained. Iv intact and dry in right arm.

## 2022-02-03 NOTE — PROGRESS NOTES
0450-RT called in regards to pt feeling sob, PRN inhaler, given, pt back to bed, call bell in reach.

## 2022-02-03 NOTE — DISCHARGE SUMMARY
Discharge Summary       PATIENT ID: Bhanu Hurst  MRN: [id number]   YOB: 1955    DATE OF ADMISSION: 2/2/2022  4:48 AM    DATE OF DISCHARGE: 02/03/22    PRIMARY CARE PROVIDER: Ale Neal MD     ATTENDING PHYSICIAN: Parviz Spears MD  DISCHARGING PROVIDER: Parviz Spears MD        CONSULTATIONS: IP CONSULT TO NEPHROLOGY    PROCEDURES/SURGERIES: * No surgery found *    ADMITTING [address]:   Bhanu Hurst is a 61 y.o. female with a past medical Hx significant for ESRD/HD  (2x/week), HTN, HLP, Asthma, Renal transplant (2002),and Hypothyroidism, who presents to the ED today with complaints of shortness of breath x 3 weeks. Hx is obtained from patient. Pt states she has been short of breath since last hospitalization in December, states her symptom never really went away, states she believes asthma flare had returned since December after several years without  a flare, states does not see a pumonologist. states sob is worse on exertion, and also exercabated with cough, +ve orthopnea. Denies fevers, chills, chest pain, nausea, vomiting, or any other symptoms. states she saw her pcp, who gave her a steroid tablets for 1 week, yet no relief. States in the Last 2 days, sob is worsening, cough is non productive and increased, no relief with her neb treatment, states wheezing is worsened, and this prompted her ED visit. States she had been told by HD nurses that she is having increased weight gain lately, No other complaints verbalized. DISCHARGE DIAGNOSES / PLAN:      Assessment & Plan:      #1: Shortness of breath:  -suspect mainly due to chf, it was thought that her HD plan should be 3x weekly from her last hospitalization I december.  Today, her her cxr is negative, her bnp is 1539, she had reported weight gain and +ve orthopnea.  -pt states she was told by her cardiologist to quit taking eliquis due to cross interractions with epidural injections for her chronic back pain.  -hx esrd on hd 2 x/week,    -admit to inpatient, tele  -nephrology-Dr cintron consulted in ed for HD     #2: Hyperkalemia:  -presents with a K level of 6.1,  -give kayexealge 15gm pending hemodialysis plan per nephro     #3: Asthma exacerbation:  -start solumedrol q 8hr, taper as needed  -albuterol q 4hr  -continue home trelegy and montelukast     #4: Hypertension:  -chronic issue, fairly stable, continue carvedilol and amlodipine. Hydralazine prn     #6: Hyperlipidemia: chronic issue, on simvastatin. #7: Hypothyroidism: chronic issue, on levothyroxine. #8:  ESRD/HD:  -hemodialysis per nephrology. #9: Atrial Fibrillation:  -continue amiodarone, eliquis stopped by her cardiologist      Consider changing to 3x a week HD  Close follow up with Pulm as oupt. 6 min walk shows no need for home 02  Pt repeat bmp not back yet, but her only ride is here to , she was dialyed yesterday, with a 2 k bath. Recheck at next hd      FOLLOW UP APPOINTMENTS:    Follow-up Information     Follow up With Specialties Details Why Contact Info    Leesa Salguero MD Family Medicine On 2/11/2022 @[id number] [id number] [facility],[address]  [facility]  [phone]               DIET: renal      DISCHARGE MEDICATIONS:  Current Discharge Medication List            NOTIFY YOUR PHYSICIAN FOR ANY OF THE FOLLOWING:   Fever over 101 degrees for 24 hours. Chest pain, shortness of breath, fever, chills, nausea, vomiting, diarrhea, change in mentation, falling, weakness, bleeding. Severe pain or pain not relieved by medications. Or, any other signs or symptoms that you may have questions about. PHYSICAL EXAMINATION AT DISCHARGE:  General:          Alert, cooperative, no distress, appears stated age.      HEENT:           Atraumatic, anicteric sclerae, pink conjunctivae                          No oral ulcers, mucosa moist, throat clear, dentition fair  Neck:               Supple, symmetrical  Lungs:             end exp wheeze  Chest wall: No tenderness  No Accessory muscle use. Heart:              Regular  rhythm,  No  murmur   No edema  Abdomen:        Soft, non-tender. Not distended. Bowel sounds normal  Extremities:     No cyanosis. No clubbing,                            Skin turgor normal, Capillary refill normal  Skin:                Not pale. Not Jaundiced  No rashes   Psych:             Not anxious or agitated.   Neurologic:      Alert, moves all extremities, answers questions appropriately and responds to commands       CHRONIC MEDICAL DIAGNOSES:  Problem List as of 2/3/2022 Date Reviewed: 12/22/2021          Codes Class Noted - Resolved    Fluid overload ICD-10-CM: E87.70  ICD-9-CM: 276.69  2/2/2022 - Present        Atypical chest pain ICD-10-CM: R07.89  ICD-9-CM: 786.59  12/24/2021 - Present        ESRD needing dialysis [facility]) ICD-10-CM: N18.6, Z99.2  ICD-9-CM: 585.6  12/24/2021 - Present        Mild persistent asthma with (acute) exacerbation ICD-10-CM: J45.31  ICD-9-CM: 493.92  12/24/2021 - Present        Intractable vomiting ICD-10-CM: R11.10  ICD-9-CM: 536.2  8/9/2021 - Present        Left leg pain ICD-10-CM: M79.605  ICD-9-CM: 729.5  7/14/2021 - Present        Acute hyperkalemia ICD-10-CM: E87.5  ICD-9-CM: 276.7  6/12/2021 - Present        Encounter for cholecystectomy ICD-10-CM: Z76.89  ICD-9-CM: V65.8  5/6/2021 - Present    Overview Signed 5/6/2021  9:13 AM by Sherman Rodriguez MD     7/2020             Acute left-sided low back pain without sciatica ICD-10-CM: M54.50  ICD-9-CM: 724.2  5/6/2021 - Present        Atrial fibrillation ([address] ICD-10-CM: I48.91  ICD-9-CM: 427.31  5/6/2021 - Present        Chronic anticoagulation ICD-10-CM: Z79.01  ICD-9-CM: V58.61  5/6/2021 - Present        Stomatitis ICD-10-CM: K12.1  ICD-9-CM: 528.00  3/2/2021 - Present        Thrush of mouth and esophagus ([facility] ICD-10-CM: B37.81, B37.0  ICD-9-CM: 112.84, 112.0  3/2/2021 - Present        ESRD (end stage renal disease) on dialysis [facility]) ICD-10-CM: N18.6, Z99.2  ICD-9-CM: 585.6, V45.11  12/28/2020 - Present        History of DVT (deep vein thrombosis) ICD-10-CM: Z86.718  ICD-9-CM: V12.51  12/28/2020 - Present        Hypothyroidism ICD-10-CM: E03.9  ICD-9-CM: 244.9  12/24/2020 - Present        Vertigo ICD-10-CM: R42  ICD-9-CM: 780.4  12/24/2020 - Present        Calculus of gallbladder with acute cholecystitis without obstruction ICD-10-CM: K80.00  ICD-9-CM: 574.00  10/9/2020 - Present        Acute recurrent maxillary sinusitis ICD-10-CM: J01.01  ICD-9-CM: 461.0  8/6/2020 - Present        Ulcer ICD-10-CM: FAL5188  ICD-9-CM: 707.9  Unknown - Present        Obesity ICD-10-CM: E66.9  ICD-9-CM: 278.00  Unknown - Present        Migraine ICD-10-CM: S29.181  ICD-9-CM: 346.90  Unknown - Present        Hypertension ICD-10-CM: I10  ICD-9-CM: 401.9  Unknown - Present        Hypercholesteremia ICD-10-CM: E78.00  ICD-9-CM: 272.0  Unknown - Present        GERD (gastroesophageal reflux disease) ICD-10-CM: K21.9  ICD-9-CM: 530.81  Unknown - Present        Burning with urination ICD-10-CM: R30.0  ICD-9-CM: 788.1  Unknown - Present    Overview Signed 2/13/2017 12:02 PM by Baron Dooley A     frequent uti             Arrhythmia ICD-10-CM: I49.9  ICD-9-CM: 427.9  Unknown - Present        Hyperkalemia ICD-10-CM: E87.5  ICD-9-CM: 276.7  11/13/2016 - Present        Pruritus ICD-10-CM: L29.9  ICD-9-CM: 698.9  9/29/2016 - Present        Chronic kidney disease, stage III (moderate) ([facility] ICD-10-CM: N18.30  ICD-9-CM: 585.3  9/27/2016 - Present        Recurrent urinary tract infection ICD-10-CM: N39.0  ICD-9-CM: 599.0  9/27/2016 - Present        Nausea and vomiting ICD-10-CM: R11.2  ICD-9-CM: 787.01  4/10/2016 - Present        Diverticulitis of intestine ICD-10-CM: K57.92  ICD-9-CM: 562.11  4/2/2016 - Present        Cystic disease of kidney ICD-10-CM: Q61.9  ICD-9-CM: 753.10  3/16/2015 - Present        Gastritis and duodenitis ICD-10-CM: K29.90  ICD-9-CM: 535.50  2/23/2015 - Present Anemia ICD-10-CM: D64.9  ICD-9-CM: 285.9  11/10/2014 - Present        Disease due to gram-negative bacillus ICD-10-CM: B96.89  ICD-9-CM: 041.85  10/17/2014 - Present    Overview Signed 2/13/2017 11:58 AM by Alyssa LLANOS     Overview:   Acinetobacter baumannii Septicemia and UTI 10/14/2014 (cultures at [facility])             Fever ICD-10-CM: R50.9  ICD-9-CM: 780.60  10/14/2014 - Present        Drug-induced hyperglycemia ICD-10-CM: R73.9, T50.905A  ICD-9-CM: 790.29, E947.9  6/28/2014 - Present        Exacerbation of asthma ICD-10-CM: J45.901  ICD-9-CM: 493.92  6/25/2014 - Present        Systemic inflammatory response syndrome (SIRS) (HCC) ICD-10-CM: R65.10  ICD-9-CM: 995.90  6/23/2014 - Present        Kidney transplant rejection ICD-10-CM: T86.11  ICD-9-CM: 996.81  4/10/2014 - Present    Overview Signed 2/13/2017 11:58 AM by Yoly David     Overview:   Collected: Shahana Tomlin Dr: Claudy Artis MD    Case Number: [id number]  [address] [place] [address]    Case Number: [id number]    DIAGNOSIS:  ----------  ALLOGRAFT KIDNEY, NEEDLE BIOPSY (12 YEARS, 2 MONTHS):  - SUBOPTIMAL SPECIMEN: RENAL MEDULLA, INCLUDING DEEP MEDULLA WITH  FOCAL BENIGN UROTHELIAL EPITHELIUM. - MILD NEUTROPHILIC TUBULITIS WITH INTRALUMINAL NEUTROPHILS,  CORRELATE WITH URINE CULTURE TO RULE OUT BACTERIAL INFECTION. - MILD LYMPHOCYTIC TUBULITIS CONSISTENT WITH BORDERLINE CHANGE:  (\"SUSPICIOUS\" FOR ACUTE CELLULAR REJECTION) AS PER BANFF SCHEMA. - CONGESTED PERITUBULAR CAPILLARIES (NON-SPECIFIC), BUT RENAL  VEIN THROMBOSIS OR STENOSIS SHOULD BE EXCLUDED CLINICALLY. - FOCAL SMALL INTERSTITIAL COLLECTION OF CAST MATERIAL  (NON-SPECIFIC), BUT  URINARY OBSTRUCTION SHOULD BE EXCLUDED CLINICALLY. - NO DEFINITIVE STAINING FOR POLYOMAVIRUSES (SEE DESCRIPTION).   - FOCAL C4D REACTIVITY ALONG PERITUBULAR CAPILLARY WALLS WITH  HIGH NON-SPECIFIC BACKGROUND STAINING; SIGNIFICANCE UNCERTAIN AND  CORRELATION WITH FLOW PRA IS SUGGESTED TO EXCLUDE EARLY HUMORAL  REJECTION. - SMALL VESSEL SCLEROSIS, MILD TO MODERATE TO SEVERE.  - TUBULAR ATROPHY AND INTERSTITIAL FIBROSIS CANNOT BE ADEQUATELY  ASSESSED  IN THE ABSENCE OF RENAL CORTEX. Re-Bx - Collected: Nelsy Ham Dr: Marito Stark MD    Case Number: [id number]  [address] [place] [address]    Case Number: [id number]    DIAGNOSIS:  ----------  ALLOGRAFT KIDNEY, NEEDLE BIOPSY (12 YEARS, 2 MONTHS):  - BORDERLINE CHANGE (\"SUSPICIOUS\" FOR ACUTE CELLULAR REJECTION)  TO FOCAL  MILD ACUTE CELLULAR REJECTION (BANFF TYPE 1A) (SEE COMMENT). - MILD NEUTROPHILIC INTERSTITIAL INFLAMMATION, CORRELATE WITH  URINE CULTURE  TO RULE OUT SUPERIMPOSED BACTERIAL INFECTION. - SMALL INTERSTITIAL COLLECTIONS OF PAS-POSITIVE CAST MATERIAL  AND CYSTICALLY DILATED PACKER'S SPACE WITH PAS-POSITIVE  PROTEINACEOUS FILTRATE (SEE  COMMENT). - ACUTE ISCHEMIC-TYPE TUBULAR INJURY IS NOTED.  - FOCAL C4D REACTIVITY ALONG PERITUBULAR CAPILLARY WALLS;  SIGNIFICANCE  UNCERTAIN AND CORRELATION WITH FLOW PRA IS SUGGESTED TO EXCLUDE  EARLY  HUMORAL REJECTION. - NEGATIVE IMMUNOSTAIN FOR POLYOMAVIRUSES (BK, HOMER). - CHRONIC CHANGES: GLOBAL SCLEROSIS IN APPROXIMATELY 14 OUT OF 60  (23%)  GLOMERULI, FOCAL SEGMENTAL GLOMERULOSCLEROSIS IN APPROXIMATELY  2 OUT OF  46 (4%) NON-OBSOLESCENT GLOMERULI, MODERATE TO SEVERE  ARTERIOSCLEROSIS,  MILD TO MODERATE TO SEVERE ARTERIOLAR HYALINE SCLEROSIS, AND  MODERATE  INTERSTITIAL FIBROSIS/TUBULAR ATROPHY (SEE COMMENT).              Acute renal failure (HCC) ICD-10-CM: N17.9  ICD-9-CM: 584.9  4/7/2014 - Present        Renal transplant disorder ICD-10-CM: T86.10  ICD-9-CM: 996.81  4/7/2014 - Present        Systemic infection ([address] ICD-10-CM: A41.9  ICD-9-CM: 038.9  1/7/2014 - Present        Asthma ICD-10-CM: J45.909  ICD-9-CM: 493.90  8/21/2013 - Present        Diverticular disease of large intestine ICD-10-CM: K57.30  ICD-9-CM: 562.10  8/21/2013 - Present Essential hypertension ICD-10-CM: I10  ICD-9-CM: 401.9  8/21/2013 - Present        Seasonal allergic rhinitis due to pollen ICD-10-CM: J30.1  ICD-9-CM: 477.0  8/21/2013 - Present        Hyperlipidemia ICD-10-CM: E78.5  ICD-9-CM: 272.4  8/21/2013 - Present        UTI (urinary tract infection) ICD-10-CM: N39.0  ICD-9-CM: 599.0  8/21/2013 - Present        Fecal incontinence ICD-10-CM: R15.9  ICD-9-CM: 787.60  4/17/2013 - Present        History of kidney transplant ICD-10-CM: Z94.0  ICD-9-CM: V42.0  4/30/2012 - Present        CMV (cytomegalovirus) antibody positive ICD-10-CM: R76.8  ICD-9-CM: 795.79  4/30/2012 - Present    Overview Signed 2/13/2017 11:58 AM by Shanice LLANOS     Overview:   Donor negative             Hematuria ICD-10-CM: R31.9  ICD-9-CM: 599.70  4/30/2012 - Present        Migraine without status migrainosus, not intractable ICD-10-CM: H04.216  ICD-9-CM: 346.90  2/24/2010 - Present        Renal cyst ICD-10-CM: N28.1  ICD-9-CM: 753.10  1/1/2002 - Present    Overview Signed 2/13/2017 12:01 PM by Shanice LLANOS     kidney transplant              RESOLVED: Chronic obstructive pulmonary disease ([facility] ICD-10-CM: J44.9  ICD-9-CM: 633  8/21/2013 - 1/20/2022        RESOLVED: Gastroesophageal reflux disease ICD-10-CM: K21.9  ICD-9-CM: 530.81  8/21/2013 - 8/3/2021        RESOLVED: Adiposity ICD-10-CM: E66.9  ICD-9-CM: 278.00  2/1/2010 - 8/3/2021              Greater than 35 minutes were spent with the patient on counseling and coordination of care    Signed:   Kojo Han MD  2/3/2022  11:25 AM

## 2022-02-04 ENCOUNTER — PATIENT OUTREACH (OUTPATIENT)
Dept: CASE MANAGEMENT | Age: 67
End: 2022-02-04

## 2022-02-04 NOTE — PROGRESS NOTES
Transitions of Care Call  Call within 2 business days of discharge: Yes     Patient: Wendy Fuentes Patient : 1955 MRN: [id number]    Last Discharge [address]       Complaint Diagnosis Description Type Department Provider    22 Respiratory Distress  ED to Hosp-Admission (Discharged) (ADMIT) [facility] Gricelda Rivera MD; Adebayo Adamson... Patient was admitted to [facility] from 22 to 2/3/22 for volume overload d/t ESRD, hyperkalemia, and asthma exacerbation. Recommendation to consider increasing HD from 2x/wk to 3x/wk. Was this an external facility discharge? No Discharge Facility: [facility]    Challenges to be reviewed by the provider   Additional needs identified to be addressed with provider no  none           Encounter was not routed to provider for escalation. Method of communication with provider: none. Care Transitions Nurse (CTN) contacted the patient to complete transitions of care assessment and follow up. Verified patient's name and  as identifiers. Introduced self, role, and reason for call. Discussed COVID-19 related testing which was available at this time. Test results were negative. Patient informed of results, if available? yes. Current Symptoms:cough, no new symptoms and no worsening symptoms. Reports with asthma she sometimes gets a dry cough, which she currently has. Denies any edema or SOB. Returned from HD this morning. Reviewed New or Changed Meds: N/A    Do you have what you need at home?  Durable Medical Equipment ordered at discharge: None   Home Health/Outpatient orders at discharge: none       Patient education provided: Reviewed appropriate site of care based on symptoms and resources available to patient including: PCP, Specialist and When to call 911. Follow up appointment scheduled within 7 days of discharge: no. If no appointment scheduled, scheduling offered: n/a.   Appt with PCP Dr. Valdez Loaiza was canceled d/t pt attending HD on , so she prefers to leave appt on 2/17/22  Future Appointments   Date Time Provider Elena Hurdi   2/9/2022  1:30 PM [facility]   2/11/2022 11:15 AM Lisbeth Barron MD [facility] BS AMB   3/9/2022 11:30 AM Gloria Vance MD [facility] BS AMB   3/23/2022  3:15 PM MD MAKAYLA Crabtree BS AMB   HD 2 days per week, attended today      Interventions: Obtained and reviewed discharge summary and/or continuity of care documents and Education of patient/family/caregiver/guardian to support self-management-Reviewed s/s volume overload and red flags. Pt denies any concerns at this time. Denies being told to check daily weights. Adherent to daily fluid limit of 32 oz and denies having any issues with adherance. She denies having a dx of CHF or being told she had a CHF exacerbation during her admission. Denies having a pulmonologist. Using her neb treatments Q 4 hours. Explains that she had edema/SOB after a hospitalization in December, for which she received an oral steroid. The steroid caused more edema leading to fluid overload and asthma exacerbation. Today at HD she asked them to remove some fluid, although there weren't planning on it originally due to euvolemic state. CTN reviewed discharge instructions, medical action plan and red flags with patient who verbalized understanding. Provided contact information for future needs. Plan for follow-up call in 5-7 days based on severity of symptoms and risk factors.   Plan for next call: symptom management-assess for red flags and self management-assess if pt checks BP, pulse ox, and if she is willing to begin checking daily weights    Dominguez Mao RN

## 2022-02-10 ENCOUNTER — HOSPITAL ENCOUNTER (EMERGENCY)
Age: 67
Discharge: HOME | End: 2022-02-10
Payer: MEDICARE

## 2022-02-10 ENCOUNTER — APPOINTMENT (OUTPATIENT)
Dept: CT IMAGING | Age: 67
End: 2022-02-10
Attending: FAMILY MEDICINE
Payer: MEDICARE

## 2022-02-10 VITALS
BODY MASS INDEX: 29.27 KG/M2 | OXYGEN SATURATION: 97 % | HEIGHT: 61 IN | SYSTOLIC BLOOD PRESSURE: 168 MMHG | HEART RATE: 71 BPM | RESPIRATION RATE: 18 BRPM | WEIGHT: 155 LBS | DIASTOLIC BLOOD PRESSURE: 82 MMHG | TEMPERATURE: 98.4 F

## 2022-02-10 DIAGNOSIS — N30.01 ACUTE CYSTITIS WITH HEMATURIA: Primary | ICD-10-CM

## 2022-02-10 LAB
ALBUMIN SERPL-MCNC: 3.9 G/DL (ref 3.5–4.7)
ALBUMIN/GLOB SERPL: 1.5 {RATIO}
ALP SERPL-CCNC: 79 U/L (ref 38–126)
ALT SERPL-CCNC: 24 U/L (ref 3–52)
ANION GAP SERPL CALC-SCNC: 12 MMOL/L
APPEARANCE UR: ABNORMAL
AST SERPL W P-5'-P-CCNC: 27 U/L (ref 14–74)
BACTERIA URNS QL MICRO: ABNORMAL /HPF
BASOPHILS # BLD: 0.1 K/UL (ref 0–0.1)
BASOPHILS NFR BLD: 1 % (ref 0–2)
BILIRUB SERPL-MCNC: 0.3 MG/DL (ref 0.2–1)
BILIRUB UR QL: NEGATIVE
BUN SERPL-MCNC: 39 MG/DL (ref 9–21)
BUN/CREAT SERPL: 5
CA-I BLD-MCNC: 8 MG/DL (ref 8.5–10.5)
CHLORIDE SERPL-SCNC: 103 MMOL/L (ref 94–111)
CO2 SERPL-SCNC: 24 MMOL/L (ref 21–33)
COLOR UR: YELLOW
CREAT SERPL-MCNC: 8.1 MG/DL (ref 0.7–1.2)
DIFFERENTIAL METHOD BLD: ABNORMAL
EOSINOPHIL # BLD: 0.1 K/UL (ref 0–0.4)
EOSINOPHIL NFR BLD: 1 % (ref 0–5)
EPITH CASTS URNS QL MICRO: ABNORMAL /LPF (ref 0–20)
ERYTHROCYTE [DISTWIDTH] IN BLOOD BY AUTOMATED COUNT: 15.2 % (ref 11.6–14.5)
GLOBULIN SER CALC-MCNC: 2.6 G/DL
GLUCOSE BLD STRIP.AUTO-MCNC: 77 MG/DL (ref 70–110)
GLUCOSE SERPL-MCNC: 75 MG/DL (ref 70–110)
GLUCOSE UR STRIP.AUTO-MCNC: NEGATIVE MG/DL
HCT VFR BLD AUTO: 34 % (ref 35–45)
HGB BLD-MCNC: 10.3 G/DL (ref 12–16)
HGB UR QL STRIP: ABNORMAL
IMM GRANULOCYTES # BLD AUTO: 0.1 K/UL (ref 0–0.04)
IMM GRANULOCYTES NFR BLD AUTO: 1 % (ref 0–0.5)
KETONES UR QL STRIP.AUTO: NEGATIVE MG/DL
LEUKOCYTE ESTERASE UR QL STRIP.AUTO: ABNORMAL
LYMPHOCYTES # BLD: 1.3 K/UL (ref 0.9–3.6)
LYMPHOCYTES NFR BLD: 22 % (ref 21–52)
MCH RBC QN AUTO: 33.6 PG (ref 24–34)
MCHC RBC AUTO-ENTMCNC: 30.3 G/DL (ref 31–37)
MCV RBC AUTO: 110.7 FL (ref 78–100)
MONOCYTES # BLD: 0.9 K/UL (ref 0.05–1.2)
MONOCYTES NFR BLD: 15 % (ref 3–10)
NEUTS SEG # BLD: 3.3 K/UL (ref 1.8–8)
NEUTS SEG NFR BLD: 60 % (ref 40–73)
NITRITE UR QL STRIP.AUTO: NEGATIVE
NRBC # BLD: 0 K/UL (ref 0–0.01)
NRBC BLD-RTO: 0 PER 100 WBC
PERFORMED BY, TECHID: NORMAL
PH UR STRIP: >8.5 [PH] (ref 5–8)
PLATELET # BLD AUTO: 170 K/UL (ref 135–420)
PMV BLD AUTO: 10 FL (ref 9.2–11.8)
POTASSIUM SERPL-SCNC: 4.1 MMOL/L (ref 3.2–5.1)
PROT SERPL-MCNC: 6.5 G/DL (ref 6.1–8.4)
PROT UR STRIP-MCNC: >300 MG/DL
RBC # BLD AUTO: 3.07 M/UL (ref 4.2–5.3)
RBC #/AREA URNS HPF: ABNORMAL /HPF (ref 0–2)
RBC MORPH BLD: ABNORMAL
RBC MORPH BLD: ABNORMAL
SODIUM SERPL-SCNC: 139 MMOL/L (ref 135–145)
SP GR UR REFRACTOMETRY: 1.01 (ref 1–1.03)
UROBILINOGEN UR QL STRIP.AUTO: 0.2 EU/DL (ref 0.2–1)
WBC # BLD AUTO: 5.8 K/UL (ref 4.6–13.2)
WBC URNS QL MICRO: ABNORMAL /HPF (ref 0–4)

## 2022-02-10 PROCEDURE — 85025 COMPLETE CBC W/AUTO DIFF WBC: CPT

## 2022-02-10 PROCEDURE — 82962 GLUCOSE BLOOD TEST: CPT

## 2022-02-10 PROCEDURE — 74176 CT ABD & PELVIS W/O CONTRAST: CPT

## 2022-02-10 PROCEDURE — 36415 COLL VENOUS BLD VENIPUNCTURE: CPT

## 2022-02-10 PROCEDURE — 99284 EMERGENCY DEPT VISIT MOD MDM: CPT

## 2022-02-10 PROCEDURE — 74011250637 HC RX REV CODE- 250/637: Performed by: FAMILY MEDICINE

## 2022-02-10 PROCEDURE — 80053 COMPREHEN METABOLIC PANEL: CPT

## 2022-02-10 PROCEDURE — 81001 URINALYSIS AUTO W/SCOPE: CPT

## 2022-02-10 RX ORDER — TRAMADOL HYDROCHLORIDE 50 MG/1
50 TABLET ORAL
Status: COMPLETED | OUTPATIENT
Start: 2022-02-10 | End: 2022-02-10

## 2022-02-10 RX ORDER — LEVOFLOXACIN 500 MG/1
250 TABLET, FILM COATED ORAL
Qty: 1 TABLET | Refills: 0 | Status: ON HOLD | OUTPATIENT
Start: 2022-02-10 | End: 2022-04-08

## 2022-02-10 RX ORDER — ONDANSETRON 4 MG/1
4 TABLET, ORALLY DISINTEGRATING ORAL
Status: COMPLETED | OUTPATIENT
Start: 2022-02-10 | End: 2022-02-10

## 2022-02-10 RX ORDER — ONDANSETRON 4 MG/1
4 TABLET, ORALLY DISINTEGRATING ORAL
Qty: 10 TABLET | Refills: 0 | Status: SHIPPED | OUTPATIENT
Start: 2022-02-10 | End: 2022-04-12 | Stop reason: SDUPTHER

## 2022-02-10 RX ORDER — LEVOFLOXACIN 250 MG/1
250 TABLET ORAL
Status: COMPLETED | OUTPATIENT
Start: 2022-02-10 | End: 2022-02-10

## 2022-02-10 RX ORDER — DICYCLOMINE HYDROCHLORIDE 10 MG/1
20 CAPSULE ORAL
Status: COMPLETED | OUTPATIENT
Start: 2022-02-10 | End: 2022-02-10

## 2022-02-10 RX ADMIN — TRAMADOL HYDROCHLORIDE 50 MG: 50 TABLET, FILM COATED ORAL at 12:59

## 2022-02-10 RX ADMIN — LEVOFLOXACIN 250 MG: 250 TABLET, FILM COATED ORAL at 12:58

## 2022-02-10 RX ADMIN — DICYCLOMINE HYDROCHLORIDE 20 MG: 10 CAPSULE ORAL at 10:22

## 2022-02-10 RX ADMIN — ONDANSETRON 4 MG: 4 TABLET, ORALLY DISINTEGRATING ORAL at 10:22

## 2022-02-10 NOTE — ED PROVIDER NOTES
Patient presents to the ED for lower abdominal pain, nausea, and decreased appetite x2 days. She denies known sick contacts. No vomiting or diarrhea. No fever, chills, SOB, chest pain, dizziness or syncope.  She denies similar pain in the past. She is on dialysis twice a week, states she will still make urine regularly           Past Medical History:   Diagnosis Date    Anemia NEC     Arrhythmia     Asthma     Asthma     Burning with urination     frequent uti    Chronic kidney disease     dialysis    CMV (cytomegalovirus) antibody positive     D[facility] ([facility]     M/W/F    Dyspepsia and other specified disorders of function of stomach     stomach ulcer    Essential hypertension     GERD (gastroesophageal reflux disease)     Hypercholesteremia     Hypertension     Migraine     Obesity     Renal cyst 2002    kidney transplant     Ulcer        Past Surgical History:   Procedure Laterality Date    COLONOSCOPY      Dr Sherrill Wilde  5yrs ago    ENDOSCOPY VISIT-OUTPATIENT      Dr Sherrill Wilde  5 yrs ago    ENDOSCOPY, COLON, DIAGNOSTIC      with Dr. Lakhwinder Arce HX [address] [place] [address]  02/2021    HX GI      ulcer    HX HEENT      sinus surg    HX RENAL TRANSPLANT      HX TRANSPLANT      kidney transplant 2002    VASCULAR SURGERY PROCEDURE UNLIST      shunt in prep for dialysis         Family History:   Problem Relation Age of Onset    Colon Polyps Brother     Cancer Mother     Diabetes Father        Social History     Socioeconomic History    Marital status:      Spouse name: Not on file    Number of children: Not on file    Years of education: Not on file    Highest education level: Not on file   Occupational History    Not on file   Tobacco Use    Smoking status: Never Smoker    Smokeless tobacco: Never Used   Vaping Use    Vaping Use: Never used   Substance and Sexual Activity    Alcohol use: No    Drug use: No    Sexual activity: Not Currently   Other Topics Concern    Not on file Social History Narrative    Not on file     Social Determinants of Health     Financial Resource Strain:     Difficulty of Paying Living Expenses: Not on file   Food Insecurity:     Worried About Running Out of Food in the Last Year: Not on file    Ida of Food in the Last Year: Not on file   Transportation Needs:     Lack of Transportation (Medical): Not on file    Lack of Transportation (Non-Medical): Not on file   Physical Activity:     Days of Exercise per Week: Not on file    Minutes of Exercise per Session: Not on file   Stress:     Feeling of Stress : Not on file   Social Connections:     Frequency of Communication with Friends and Family: Not on file    Frequency of Social Gatherings with Friends and Family: Not on file    Attends Latter-day Services: Not on file    Active Member of [address] [place] [address] [employer] or Organizations: Not on file    Attends Club or Organization Meetings: Not on file    Marital Status: Not on file   Intimate Partner Violence:     Fear of Current or Ex-Partner: Not on file    Emotionally Abused: Not on file    Physically Abused: Not on file    Sexually Abused: Not on file   Housing Stability:     Unable to Pay for Housing in the Last Year: Not on file    Number of [place] in the Last Year: Not on file    Unstable Housing in the Last Year: Not on file         ALLERGIES: Aspirin, Bactrim [sulfamethoxazole-trimethoprim], Bromfenac, Cefepime, Ceftriaxone, Copper, Ibuprofen, Ketorolac tromethamine, Relafen [nabumetone], Rifampin, and Vancomycin    Review of Systems   Constitutional: Positive for appetite change. Negative for activity change, chills and fever. Respiratory: Negative for shortness of breath. Cardiovascular: Negative for chest pain. Gastrointestinal: Positive for abdominal pain and nausea. Negative for blood in stool, constipation, diarrhea and vomiting. All other systems reviewed and are negative.       Vitals:    02/10/22 0850   BP: (!) 168/82   Pulse: 71 Resp: 18   Temp: 98.4 °F (36.9 °C)   SpO2: 97%   Weight: 70.3 kg (155 lb)   Height: 5' 1\" (1.549 m)            Physical Exam  Constitutional:       General: She is not in acute distress. Appearance: She is well-developed. She is obese. She is not ill-appearing or diaphoretic. HENT:      Head: Normocephalic and atraumatic. Mouth/Throat:      Mouth: Mucous membranes are moist.   Eyes:      Extraocular Movements: Extraocular movements intact. Pupils: Pupils are equal, round, and reactive to light. Cardiovascular:      Rate and Rhythm: Normal rate. Pulmonary:      Effort: Pulmonary effort is normal. No respiratory distress. Breath sounds: No stridor. Abdominal:      General: Abdomen is flat. Bowel sounds are normal. There is no distension. Palpations: Abdomen is soft. Tenderness: There is abdominal tenderness in the right lower quadrant and left lower quadrant. There is no guarding or rebound. Hernia: No hernia is present. Comments: Mild lower abdominal TTP   Skin:     General: Skin is warm and dry. Capillary Refill: Capillary refill takes less than 2 seconds. Neurological:      General: No focal deficit present. Mental Status: She is alert and oriented to person, place, and time.    Psychiatric:         Behavior: Behavior normal.          MDM  Number of Diagnoses or Management Options  Diagnosis management comments: UTI, renal stone, appendicitis, diverticulitis, pyelonephritis, constipation       Amount and/or Complexity of Data Reviewed  Clinical lab tests: ordered and reviewed    Risk of Complications, Morbidity, and/or Mortality  Presenting problems: moderate  Diagnostic procedures: moderate  Management options: low  General comments: Discussed labs and rads with patient, stable for discharge with outpatient management    Patient Progress  Patient progress: improved         Procedures

## 2022-02-10 NOTE — ED TRIAGE NOTES
Pt arrived via ems for lower abdominal pain. Pt states it started yesterday and has not been able to eat. Pt has been able to drink Gatorade. Pt discharged from this facility 2/3/22 for same issue.

## 2022-02-11 ENCOUNTER — PATIENT OUTREACH (OUTPATIENT)
Dept: CASE MANAGEMENT | Age: 67
End: 2022-02-11

## 2022-02-18 ENCOUNTER — PATIENT OUTREACH (OUTPATIENT)
Dept: CASE MANAGEMENT | Age: 67
End: 2022-02-18

## 2022-02-18 NOTE — PROGRESS NOTES
CTN was unsuccessful at contacting patient last week to complete transitions of care follow up. Patient has not returned CTN's call. Sent patient a [employer] message. Resolving Transitions of Care episode.

## 2022-03-02 ENCOUNTER — TELEPHONE (OUTPATIENT)
Dept: FAMILY MEDICINE CLINIC | Age: 67
End: 2022-03-02

## 2022-03-02 NOTE — TELEPHONE ENCOUNTER
Artie Marinelli with [facility] called stated they will see her for PT 1 time this week 2 times next week and 1 time 2 wks after that. Also requesting order for wheelchair.   Phone # [phone]

## 2022-03-03 ENCOUNTER — TELEPHONE (OUTPATIENT)
Dept: FAMILY MEDICINE CLINIC | Age: 67
End: 2022-03-03

## 2022-03-03 NOTE — TELEPHONE ENCOUNTER
Donna Steinberg calls from [facility] to say that she is at patient's home and she is complaining of burning and pain with urination. She just had a recent hospital stay and that is why she missed her appointment with you on 03/01. Patient is hoping that you can look at her records and call something in for a UTI.     Please call [employer] at [facility] at [phone]

## 2022-03-06 DIAGNOSIS — K21.9 GASTROESOPHAGEAL REFLUX DISEASE WITHOUT ESOPHAGITIS: ICD-10-CM

## 2022-03-07 ENCOUNTER — TELEPHONE (OUTPATIENT)
Dept: FAMILY MEDICINE CLINIC | Age: 67
End: 2022-03-07

## 2022-03-07 NOTE — TELEPHONE ENCOUNTER
Spoke to Fly Gutierrez at [facility] and she says they will collect the sample at their next visit

## 2022-03-07 NOTE — TELEPHONE ENCOUNTER
----- Message from Lary Cain sent at 3/4/2022 11:49 AM EST -----  Subject: Message to Provider    QUESTIONS  Information for Provider? Will stop by the office on Monday to get forms   that she dropped off with Sherry Up   ---------------------------------------------------------------------------  --------------  [address]  What is the best way for the office to contact you? OK to leave message on   voicemail  Preferred Call Back Phone Number?  [phone]  ---------------------------------------------------------------------------  --------------  SCRIPT ANSWERS  undefined

## 2022-03-07 NOTE — TELEPHONE ENCOUNTER
----- Message from Ester Forrester sent at 3/4/2022 11:54 AM EST -----  Subject: Message to Provider    QUESTIONS  Information for Provider? She said for her last appointment she was at her   dialysis appointment, said if she misses four appointments, said she has   called each time, is not sure how she gets a now show  ---------------------------------------------------------------------------  --------------  [address]  What is the best way for the office to contact you? Do not leave any   message, patient will call back for answer  Preferred Call Back Phone Number?  [phone]  ---------------------------------------------------------------------------  --------------  SCRIPT ANSWERS  undefined

## 2022-03-08 ENCOUNTER — TELEPHONE (OUTPATIENT)
Dept: FAMILY MEDICINE CLINIC | Age: 67
End: 2022-03-08

## 2022-03-08 RX ORDER — FAMOTIDINE 20 MG/1
TABLET, FILM COATED ORAL
Qty: 30 TABLET | Refills: 1 | Status: ON HOLD | OUTPATIENT
Start: 2022-03-08 | End: 2022-04-08

## 2022-03-08 NOTE — TELEPHONE ENCOUNTER
----- Message from Harrington sent at 3/7/2022  8:46 AM EST -----  Subject: Message to Provider    QUESTIONS  Information for Provider? Pt is calling to make a HFU. Pt was in the   hospital for a UTI and slow heart rate, infection in stomach. Providers   soonest appts are in April. Please call pt daughter back at [phone]   to make a sooner appt.   ---------------------------------------------------------------------------  --------------  CALL BACK INFO  What is the best way for the office to contact you? OK to leave message on   voicemail  Preferred Call Back Phone Number? [phone]  ---------------------------------------------------------------------------  --------------  SCRIPT ANSWERS  Relationship to Patient?  Self

## 2022-03-08 NOTE — TELEPHONE ENCOUNTER
----- Message from Mona sent at 3/7/2022  8:46 AM EST -----  Subject: Message to Provider    QUESTIONS  Information for Provider? Pt is calling to make a HFU. Pt was in the   hospital for a UTI and slow heart rate, infection in stomach. Providers   soonest appts are in April. Please call pt daughter back at [phone]   to make a sooner appt.   ---------------------------------------------------------------------------  --------------  CALL BACK INFO  What is the best way for the office to contact you? OK to leave message on   voicemail  Preferred Call Back Phone Number? [phone]  ---------------------------------------------------------------------------  --------------  SCRIPT ANSWERS  Relationship to Patient?  Self

## 2022-03-09 ENCOUNTER — PATIENT MESSAGE (OUTPATIENT)
Dept: FAMILY MEDICINE CLINIC | Age: 67
End: 2022-03-09

## 2022-03-15 ENCOUNTER — TELEPHONE (OUTPATIENT)
Dept: MAMMOGRAPHY | Age: 67
End: 2022-03-15

## 2022-03-15 ENCOUNTER — TELEPHONE (OUTPATIENT)
Dept: FAMILY MEDICINE CLINIC | Age: 67
End: 2022-03-15

## 2022-03-15 ENCOUNTER — PATIENT OUTREACH (OUTPATIENT)
Dept: CASE MANAGEMENT | Age: 67
End: 2022-03-15

## 2022-03-15 RX ORDER — OMEPRAZOLE 20 MG/1
CAPSULE, DELAYED RELEASE ORAL
Status: ON HOLD | COMMUNITY
Start: 2022-03-14 | End: 2022-04-08

## 2022-03-15 RX ORDER — CEFDINIR 300 MG/1
CAPSULE ORAL
Status: ON HOLD | COMMUNITY
Start: 2022-03-14 | End: 2022-04-08

## 2022-03-15 RX ORDER — TRAMADOL HYDROCHLORIDE 50 MG/1
TABLET ORAL
Status: ON HOLD | COMMUNITY
Start: 2022-03-14 | End: 2022-04-08

## 2022-03-15 RX ORDER — MIDODRINE HYDROCHLORIDE 10 MG/1
TABLET ORAL
Status: ON HOLD | COMMUNITY
Start: 2022-03-04 | End: 2022-04-08

## 2022-03-15 NOTE — PROGRESS NOTES
Care Transitions Initial Call    Call within 2 business days of discharge: Yes     Patient: Gurwinder Blount Patient : 1955 MRN: [id number]    Last Discharge [address]       Complaint Diagnosis Description Type Department Provider    2/10/22 Abdominal Pain Acute cystitis with hematuria ED (DISCHARGE) [facility]           Was this an external facility discharge? Yes, 3/9/2022 to 3/14/2022 Discharge Facility: [facility]    Challenges to be reviewed by the provider   Additional needs identified to be addressed with provider: no  none         Method of communication with provider : none    Discussed COVID-19 related testing which was not done at this time. Test results were not done. Patient informed of results, if available? n/a     Advance Care Planning:   Does patient have an Advance Directive:  health care decision makers updated    Inpatient Readmission Risk score: Unplanned Readmit Risk Score: 25.7 ( )    Was this a readmission? yes   Patient stated reason for the admission: acute diverticlulitis    Patients top risk factors for readmission: medical condition-diverticulitis and support system   Interventions to address risk factors: Scheduled appointment with PCP-Alexis    Care Transition Nurse (CTN) contacted the patient by telephone to perform post hospital discharge assessment. Verified name and  with patient as identifiers. Provided introduction to self, and explanation of the CTN role. CTN reviewed discharge instructions, medical action plan and red flags with patient who verbalized understanding. Were discharge instructions available to patient? yes. Reviewed appropriate site of care based on symptoms and resources available to patient including: PCP, Specialist, [address] [place] [address] and [address]. Patient given an opportunity to ask questions and does not have any further questions or concerns at this time. The patient agrees to contact the PCP office for questions related to their healthcare. Medication reconciliation was performed with patient, who verbalizes understanding of administration of home medications. Advised obtaining a 90-day supply of all daily and as-needed medications. Referral to Pharm D needed: no     Home Health/Outpatient orders at discharge: home health care  [address]: [address]  Date of initial visit: patient awaiting a call    Durable Medical Equipment ordered at discharge: None  [address]: n/a  Durable Medical Equipment received: n/a    Covid Risk Education    Educated patient about risk for severe COVID-19 due to risk factors according to CDC guidelines. CTN reviewed discharge instructions, medical action plan and red flag symptoms with the patient who verbalized understanding. Discussed COVID vaccination status: yes. Education provided on COVID-19 vaccination as appropriate. Discussed exposure protocols and quarantine with CDC Guidelines. Patient was given an opportunity to verbalize any questions and concerns and agrees to contact CTN or health care provider for questions related to their healthcare. Was patient discharged with a pulse oximeter? no. Discussed and confirmed pulse oximeter discharge instructions and when to notify provider or seek emergency care. Discussed follow-up appointments. If no appointment was previously scheduled, appointment scheduling offered: yes. Is follow up appointment scheduled within 7 days of discharge? yes. [facility] follow up appointment(s):   Future Appointments   Date Time Provider Elena Coello   3/21/2022  3:30 PM Leesa Salguero MD [facility] AMB   3/23/2022  3:15 PM Fransisca Sotelo MD [facility]   4/7/2022  4:00 PM Leesa Salguero MD [facility] AMB   4/28/2022  1:30 PM Federica Ng MD [facility]     Non-[facility] follow up appointment(s): n/a    Plan for follow-up call in 5-7 days based on severity of symptoms and risk factors.   Plan for next call: symptom management-ensure that patient symptoms are improving  CTN provided contact information for future needs. Goals Addressed                 This Visit's Progress     Prevent complications post hospitalization. 1. CTN will monitor X 4 weeks    2. Ensure provider appt is scheduled within 7 days post-discharge 3/15/2022 Patient sees PCP 3/21/222 at 3:30 pm    3. Confirm patient attended post-discharge provider apt    4. Complete post-visit call to confirm attendance and update care needs 3/15/2022 Patient stated that she is doing ok. It is burning today when she uses restroom. She is on antibiotic. Encouraged her to drink water. 5. Review/educate common or potential \"red flags\" of condition worsening 3/15/2022 Reviewed signs /symptoms of diverticulitis such as pain in abdomen, bloating, blood in stool. Constipation, diarrhea, nausea, vomiting, flatulence, chills, loss of appetite, and cramping to name a few. 6. Evaluate adherence to medications and priority barriers to resolve 3/15/2022 Patient stated that she has all her meds and is taking as directed. 7. Instruct on adherence to medications as ordered and assess for therapeutic response and side-effects  3/15/2022 Patient stated that she is aware of why she takes meds and knows when to call physician with issues. 8. Discuss and evaluate ADL performance. Provide recommendations on energy conservation, particularly related to transition home from an inpatient admission. 3/15/2022 Patient moves about as she can. She rests as needed.

## 2022-03-15 NOTE — TELEPHONE ENCOUNTER
----- Message from [facility] [employer] sent at 3/15/2022 11:38 AM EDT -----  Subject: Message to Provider    QUESTIONS  Information for Provider? patient would like to know if paperwork is done   ---------------------------------------------------------------------------  --------------  CALL BACK INFO  What is the best way for the office to contact you? OK to leave message on   voicemail  Preferred Call Back Phone Number? [phone]  ---------------------------------------------------------------------------  --------------  SCRIPT ANSWERS  Relationship to Patient?  Self

## 2022-03-18 PROBLEM — Z79.01 CHRONIC ANTICOAGULATION: Status: ACTIVE | Noted: 2021-05-06

## 2022-03-18 PROBLEM — J01.01 ACUTE RECURRENT MAXILLARY SINUSITIS: Status: ACTIVE | Noted: 2020-08-06

## 2022-03-18 PROBLEM — B37.0 THRUSH OF MOUTH AND ESOPHAGUS (HCC): Status: ACTIVE | Noted: 2021-03-02

## 2022-03-18 PROBLEM — B37.81 THRUSH OF MOUTH AND ESOPHAGUS (HCC): Status: ACTIVE | Noted: 2021-03-02

## 2022-03-18 PROBLEM — N18.6 ESRD (END STAGE RENAL DISEASE) ON DIALYSIS (HCC): Status: ACTIVE | Noted: 2020-12-28

## 2022-03-18 PROBLEM — M79.605 LEFT LEG PAIN: Status: ACTIVE | Noted: 2021-07-14

## 2022-03-18 PROBLEM — N18.6 ESRD NEEDING DIALYSIS (HCC): Status: ACTIVE | Noted: 2021-12-24

## 2022-03-18 PROBLEM — Z99.2 ESRD (END STAGE RENAL DISEASE) ON DIALYSIS (HCC): Status: ACTIVE | Noted: 2020-12-28

## 2022-03-18 PROBLEM — Z99.2 ESRD NEEDING DIALYSIS (HCC): Status: ACTIVE | Noted: 2021-12-24

## 2022-03-18 PROBLEM — Z76.89 ENCOUNTER FOR CHOLECYSTECTOMY: Status: ACTIVE | Noted: 2021-05-06

## 2022-03-19 PROBLEM — J45.31 MILD PERSISTENT ASTHMA WITH (ACUTE) EXACERBATION: Status: ACTIVE | Noted: 2021-12-24

## 2022-03-19 PROBLEM — E87.5 ACUTE HYPERKALEMIA: Status: ACTIVE | Noted: 2021-06-12

## 2022-03-19 PROBLEM — R11.10 INTRACTABLE VOMITING: Status: ACTIVE | Noted: 2021-08-09

## 2022-03-19 PROBLEM — Z86.718 HISTORY OF DVT (DEEP VEIN THROMBOSIS): Status: ACTIVE | Noted: 2020-12-28

## 2022-03-19 PROBLEM — E03.9 HYPOTHYROIDISM: Status: ACTIVE | Noted: 2020-12-24

## 2022-03-19 PROBLEM — K12.1 STOMATITIS: Status: ACTIVE | Noted: 2021-03-02

## 2022-03-19 PROBLEM — I48.91 ATRIAL FIBRILLATION (HCC): Status: ACTIVE | Noted: 2021-05-06

## 2022-03-19 PROBLEM — E87.70 FLUID OVERLOAD: Status: ACTIVE | Noted: 2022-02-02

## 2022-03-19 PROBLEM — R07.89 ATYPICAL CHEST PAIN: Status: ACTIVE | Noted: 2021-12-24

## 2022-03-20 PROBLEM — M54.50 ACUTE LEFT-SIDED LOW BACK PAIN WITHOUT SCIATICA: Status: ACTIVE | Noted: 2021-05-06

## 2022-03-20 PROBLEM — R42 VERTIGO: Status: ACTIVE | Noted: 2020-12-24

## 2022-03-20 PROBLEM — K80.00 CALCULUS OF GALLBLADDER WITH ACUTE CHOLECYSTITIS WITHOUT OBSTRUCTION: Status: ACTIVE | Noted: 2020-10-09

## 2022-03-22 ENCOUNTER — PATIENT OUTREACH (OUTPATIENT)
Dept: CASE MANAGEMENT | Age: 67
End: 2022-03-22

## 2022-03-22 NOTE — PROGRESS NOTES
3/22/2022   1:04 PM    CTN reviewed patient chart and attempted to call for routine follow up. Message could not be left at either number for patient. First number stated that voicemail could not receive messages as it was unavailable. Then the second number stated that voice mail had not been set up yet- so voice mail could not be left. Will attempt to call at a later time.

## 2022-04-07 ENCOUNTER — APPOINTMENT (OUTPATIENT)
Dept: CT IMAGING | Age: 67
End: 2022-04-07
Attending: INTERNAL MEDICINE
Payer: MEDICARE

## 2022-04-07 ENCOUNTER — OFFICE VISIT (OUTPATIENT)
Dept: FAMILY MEDICINE CLINIC | Age: 67
End: 2022-04-07
Payer: MEDICARE

## 2022-04-07 ENCOUNTER — PATIENT OUTREACH (OUTPATIENT)
Dept: CASE MANAGEMENT | Age: 67
End: 2022-04-07

## 2022-04-07 ENCOUNTER — HOSPITAL ENCOUNTER (OUTPATIENT)
Age: 67
Setting detail: OBSERVATION
Discharge: HOME | End: 2022-04-08
Attending: INTERNAL MEDICINE | Admitting: INTERNAL MEDICINE
Payer: MEDICARE

## 2022-04-07 VITALS
WEIGHT: 155 LBS | BODY MASS INDEX: 29.27 KG/M2 | SYSTOLIC BLOOD PRESSURE: 148 MMHG | OXYGEN SATURATION: 96 % | HEART RATE: 73 BPM | DIASTOLIC BLOOD PRESSURE: 82 MMHG | HEIGHT: 61 IN

## 2022-04-07 DIAGNOSIS — N39.0 URINARY TRACT INFECTION WITHOUT HEMATURIA, SITE UNSPECIFIED: Primary | ICD-10-CM

## 2022-04-07 DIAGNOSIS — A08.39 CMV COLITIS (HCC): ICD-10-CM

## 2022-04-07 DIAGNOSIS — B25.9 CMV COLITIS (HCC): ICD-10-CM

## 2022-04-07 DIAGNOSIS — K85.90 ACUTE PANCREATITIS, UNSPECIFIED COMPLICATION STATUS, UNSPECIFIED PANCREATITIS TYPE: Primary | ICD-10-CM

## 2022-04-07 DIAGNOSIS — R30.0 DYSURIA: ICD-10-CM

## 2022-04-07 DIAGNOSIS — R11.0 NAUSEA WITHOUT VOMITING: ICD-10-CM

## 2022-04-07 PROBLEM — R53.1 WEAKNESS: Status: ACTIVE | Noted: 2022-04-07

## 2022-04-07 LAB
ALBUMIN SERPL-MCNC: 3.4 G/DL (ref 3.5–4.7)
ALBUMIN/GLOB SERPL: 1.2 {RATIO}
ALP SERPL-CCNC: 103 U/L (ref 38–126)
ALT SERPL-CCNC: 17 U/L (ref 3–52)
ANION GAP SERPL CALC-SCNC: 16 MMOL/L
APPEARANCE UR: ABNORMAL
AST SERPL W P-5'-P-CCNC: 21 U/L (ref 14–74)
BACTERIA URNS QL MICRO: SLIGHT /HPF
BASOPHILS # BLD: 0 K/UL (ref 0–0.1)
BASOPHILS NFR BLD: 1 % (ref 0–2)
BILIRUB DIRECT SERPL-MCNC: 0.1 MG/DL (ref 0–0.3)
BILIRUB SERPL-MCNC: 0.8 MG/DL (ref 0.2–1)
BILIRUB UR QL: NEGATIVE
BUN SERPL-MCNC: 39 MG/DL (ref 9–21)
BUN/CREAT SERPL: 4
CA-I BLD-MCNC: 7.9 MG/DL (ref 8.5–10.5)
CHLORIDE SERPL-SCNC: 96 MMOL/L (ref 94–111)
CO2 SERPL-SCNC: 25 MMOL/L (ref 21–33)
COLOR UR: YELLOW
CREAT SERPL-MCNC: 9.2 MG/DL (ref 0.7–1.2)
DIFFERENTIAL METHOD BLD: ABNORMAL
EOSINOPHIL # BLD: 0 K/UL (ref 0–0.4)
EOSINOPHIL NFR BLD: 0 % (ref 0–5)
EPITH CASTS URNS QL MICRO: ABNORMAL /LPF (ref 0–20)
ERYTHROCYTE [DISTWIDTH] IN BLOOD BY AUTOMATED COUNT: 21.8 % (ref 11.6–14.5)
GLOBULIN SER CALC-MCNC: 2.8 G/DL
GLUCOSE SERPL-MCNC: 73 MG/DL (ref 70–110)
GLUCOSE UR STRIP.AUTO-MCNC: NEGATIVE MG/DL
HCT VFR BLD AUTO: 31 % (ref 35–45)
HGB BLD-MCNC: 9.3 G/DL (ref 12–16)
HGB UR QL STRIP: ABNORMAL
IMM GRANULOCYTES # BLD AUTO: 0 K/UL (ref 0–0.04)
IMM GRANULOCYTES NFR BLD AUTO: 1 % (ref 0–0.5)
KETONES UR QL STRIP.AUTO: ABNORMAL MG/DL
LACTATE SERPL-SCNC: 0.8 MMOL/L (ref 0.5–2)
LEUKOCYTE ESTERASE UR QL STRIP.AUTO: ABNORMAL
LIPASE SERPL-CCNC: 49 U/L (ref 10–57)
LYMPHOCYTES # BLD: 1.5 K/UL (ref 0.9–3.6)
LYMPHOCYTES NFR BLD: 38 % (ref 21–52)
MAGNESIUM SERPL-MCNC: 2.1 MG/DL (ref 1.7–2.8)
MCH RBC QN AUTO: 31.5 PG (ref 24–34)
MCHC RBC AUTO-ENTMCNC: 30 G/DL (ref 31–37)
MCV RBC AUTO: 105.1 FL (ref 78–100)
MONOCYTES # BLD: 0.8 K/UL (ref 0.05–1.2)
MONOCYTES NFR BLD: 20 % (ref 3–10)
NEUTS SEG # BLD: 1.6 K/UL (ref 1.8–8)
NEUTS SEG NFR BLD: 40 % (ref 40–73)
NITRITE UR QL STRIP.AUTO: NEGATIVE
NRBC # BLD: 0 K/UL (ref 0–0.01)
NRBC BLD-RTO: 0 PER 100 WBC
PH UR STRIP: >8.5 [PH] (ref 5–8)
PLATELET # BLD AUTO: 202 K/UL (ref 135–420)
PMV BLD AUTO: 9.7 FL (ref 9.2–11.8)
POTASSIUM SERPL-SCNC: 4.2 MMOL/L (ref 3.2–5.1)
PROCALCITONIN SERPL-MCNC: 0.73 NG/ML (ref 0.5–2)
PROT SERPL-MCNC: 6.2 G/DL (ref 6.1–8.4)
PROT UR STRIP-MCNC: 300 MG/DL
RBC # BLD AUTO: 2.95 M/UL (ref 4.2–5.3)
RBC #/AREA URNS HPF: ABNORMAL /HPF (ref 0–2)
SODIUM SERPL-SCNC: 137 MMOL/L (ref 135–145)
SP GR UR REFRACTOMETRY: 1.01 (ref 1–1.03)
UROBILINOGEN UR QL STRIP.AUTO: 0.2 EU/DL (ref 0.2–1)
WBC # BLD AUTO: 3.9 K/UL (ref 4.6–13.2)
WBC URNS QL MICRO: >100 /HPF (ref 0–4)

## 2022-04-07 PROCEDURE — G8417 CALC BMI ABV UP PARAM F/U: HCPCS | Performed by: STUDENT IN AN ORGANIZED HEALTH CARE EDUCATION/TRAINING PROGRAM

## 2022-04-07 PROCEDURE — 3017F COLORECTAL CA SCREEN DOC REV: CPT | Performed by: STUDENT IN AN ORGANIZED HEALTH CARE EDUCATION/TRAINING PROGRAM

## 2022-04-07 PROCEDURE — 99285 EMERGENCY DEPT VISIT HI MDM: CPT

## 2022-04-07 PROCEDURE — 87086 URINE CULTURE/COLONY COUNT: CPT

## 2022-04-07 PROCEDURE — 84145 PROCALCITONIN (PCT): CPT

## 2022-04-07 PROCEDURE — 96365 THER/PROPH/DIAG IV INF INIT: CPT

## 2022-04-07 PROCEDURE — 83735 ASSAY OF MAGNESIUM: CPT

## 2022-04-07 PROCEDURE — 74011250636 HC RX REV CODE- 250/636: Performed by: EMERGENCY MEDICINE

## 2022-04-07 PROCEDURE — G8427 DOCREV CUR MEDS BY ELIG CLIN: HCPCS | Performed by: STUDENT IN AN ORGANIZED HEALTH CARE EDUCATION/TRAINING PROGRAM

## 2022-04-07 PROCEDURE — 74176 CT ABD & PELVIS W/O CONTRAST: CPT

## 2022-04-07 PROCEDURE — G0378 HOSPITAL OBSERVATION PER HR: HCPCS

## 2022-04-07 PROCEDURE — 74011250636 HC RX REV CODE- 250/636: Performed by: INTERNAL MEDICINE

## 2022-04-07 PROCEDURE — 99215 OFFICE O/P EST HI 40 MIN: CPT | Performed by: STUDENT IN AN ORGANIZED HEALTH CARE EDUCATION/TRAINING PROGRAM

## 2022-04-07 PROCEDURE — G9899 SCRN MAM PERF RSLTS DOC: HCPCS | Performed by: STUDENT IN AN ORGANIZED HEALTH CARE EDUCATION/TRAINING PROGRAM

## 2022-04-07 PROCEDURE — 87040 BLOOD CULTURE FOR BACTERIA: CPT

## 2022-04-07 PROCEDURE — G9231 DOC ESRD DIA TRANS PREG: HCPCS | Performed by: STUDENT IN AN ORGANIZED HEALTH CARE EDUCATION/TRAINING PROGRAM

## 2022-04-07 PROCEDURE — 96366 THER/PROPH/DIAG IV INF ADDON: CPT

## 2022-04-07 PROCEDURE — 96375 TX/PRO/DX INJ NEW DRUG ADDON: CPT

## 2022-04-07 PROCEDURE — 80076 HEPATIC FUNCTION PANEL: CPT

## 2022-04-07 PROCEDURE — G8432 DEP SCR NOT DOC, RNG: HCPCS | Performed by: STUDENT IN AN ORGANIZED HEALTH CARE EDUCATION/TRAINING PROGRAM

## 2022-04-07 PROCEDURE — 80048 BASIC METABOLIC PNL TOTAL CA: CPT

## 2022-04-07 PROCEDURE — 81001 URINALYSIS AUTO W/SCOPE: CPT

## 2022-04-07 PROCEDURE — 85025 COMPLETE CBC W/AUTO DIFF WBC: CPT

## 2022-04-07 PROCEDURE — 74011250637 HC RX REV CODE- 250/637

## 2022-04-07 PROCEDURE — 83605 ASSAY OF LACTIC ACID: CPT

## 2022-04-07 PROCEDURE — 83690 ASSAY OF LIPASE: CPT

## 2022-04-07 RX ORDER — SODIUM CHLORIDE 0.9 % (FLUSH) 0.9 %
5-40 SYRINGE (ML) INJECTION AS NEEDED
Status: DISCONTINUED | OUTPATIENT
Start: 2022-04-07 | End: 2022-04-08 | Stop reason: HOSPADM

## 2022-04-07 RX ORDER — LEVOFLOXACIN 5 MG/ML
750 INJECTION, SOLUTION INTRAVENOUS
Status: COMPLETED | OUTPATIENT
Start: 2022-04-07 | End: 2022-04-07

## 2022-04-07 RX ORDER — POLYETHYLENE GLYCOL 3350 17 G/17G
17 POWDER, FOR SOLUTION ORAL DAILY PRN
Status: DISCONTINUED | OUTPATIENT
Start: 2022-04-07 | End: 2022-04-08 | Stop reason: HOSPADM

## 2022-04-07 RX ORDER — ONDANSETRON 2 MG/ML
4 INJECTION INTRAMUSCULAR; INTRAVENOUS
Status: COMPLETED | OUTPATIENT
Start: 2022-04-07 | End: 2022-04-07

## 2022-04-07 RX ORDER — HEPARIN SODIUM 5000 [USP'U]/ML
5000 INJECTION, SOLUTION INTRAVENOUS; SUBCUTANEOUS EVERY 8 HOURS
Status: DISCONTINUED | OUTPATIENT
Start: 2022-04-07 | End: 2022-04-08

## 2022-04-07 RX ORDER — ONDANSETRON 2 MG/ML
4 INJECTION INTRAMUSCULAR; INTRAVENOUS
Status: DISCONTINUED | OUTPATIENT
Start: 2022-04-07 | End: 2022-04-08 | Stop reason: HOSPADM

## 2022-04-07 RX ORDER — HYDROMORPHONE HYDROCHLORIDE 1 MG/ML
0.5 INJECTION, SOLUTION INTRAMUSCULAR; INTRAVENOUS; SUBCUTANEOUS ONCE
Status: COMPLETED | OUTPATIENT
Start: 2022-04-07 | End: 2022-04-07

## 2022-04-07 RX ORDER — ONDANSETRON 4 MG/1
TABLET, ORALLY DISINTEGRATING ORAL
Status: COMPLETED
Start: 2022-04-07 | End: 2022-04-07

## 2022-04-07 RX ORDER — ONDANSETRON 4 MG/1
4 TABLET, ORALLY DISINTEGRATING ORAL
Status: COMPLETED | OUTPATIENT
Start: 2022-04-07 | End: 2022-04-07

## 2022-04-07 RX ORDER — ACETAMINOPHEN 650 MG/1
650 SUPPOSITORY RECTAL
Status: DISCONTINUED | OUTPATIENT
Start: 2022-04-07 | End: 2022-04-08 | Stop reason: HOSPADM

## 2022-04-07 RX ORDER — PROMETHAZINE HYDROCHLORIDE 25 MG/1
12.5 TABLET ORAL
Status: DISCONTINUED | OUTPATIENT
Start: 2022-04-07 | End: 2022-04-08 | Stop reason: HOSPADM

## 2022-04-07 RX ORDER — ACETAMINOPHEN 325 MG/1
650 TABLET ORAL
Status: DISCONTINUED | OUTPATIENT
Start: 2022-04-07 | End: 2022-04-08 | Stop reason: HOSPADM

## 2022-04-07 RX ORDER — SODIUM CHLORIDE 0.9 % (FLUSH) 0.9 %
5-40 SYRINGE (ML) INJECTION EVERY 8 HOURS
Status: DISCONTINUED | OUTPATIENT
Start: 2022-04-07 | End: 2022-04-08 | Stop reason: HOSPADM

## 2022-04-07 RX ADMIN — ONDANSETRON 4 MG: 4 TABLET, ORALLY DISINTEGRATING ORAL at 20:37

## 2022-04-07 RX ADMIN — HYDROMORPHONE HYDROCHLORIDE 0.5 MG: 1 INJECTION, SOLUTION INTRAMUSCULAR; INTRAVENOUS; SUBCUTANEOUS at 21:01

## 2022-04-07 RX ADMIN — ONDANSETRON 4 MG: 2 INJECTION INTRAMUSCULAR; INTRAVENOUS at 20:59

## 2022-04-07 RX ADMIN — SODIUM CHLORIDE 500 ML: 9 INJECTION, SOLUTION INTRAVENOUS at 21:06

## 2022-04-07 RX ADMIN — LEVOFLOXACIN 750 MG: 5 INJECTION, SOLUTION INTRAVENOUS at 21:44

## 2022-04-07 NOTE — PROGRESS NOTES
Sean Gustafson presents today for   Chief Complaint   Patient presents with   [facility] Follow Up     diverticulitis        Is someone accompanying this pt? Alla Dakins     Is the patient using any DME equipment during OV? No     Depression Screening:  3 most recent PHQ Screens 1/20/2022   Little interest or pleasure in doing things Several days   Feeling down, depressed, irritable, or hopeless Several days   Total Score PHQ 2 2       Learning Assessment:  Learning Assessment 8/17/2021   PRIMARY LEARNER Patient   HIGHEST LEVEL OF EDUCATION - PRIMARY LEARNER  SOME COLLEGE   BARRIERS PRIMARY LEARNER NONE   PRIMARY LANGUAGE ENGLISH   LEARNER PREFERENCE PRIMARY DEMONSTRATION   LEARNING SPECIAL TOPICS NO   ANSWERED BY SELF   RELATIONSHIP SELF       Fall Risk  Fall Risk Assessment, last 12 mths 12/24/2020   Able to walk? Yes   Fall in past 12 months? 0   Do you feel unsteady? 0   Are you worried about falling 0   Number of falls in past 12 months -   Fall with injury? -       Health Maintenance reviewed and discussed and ordered per Provider. Health Maintenance Due   Topic Date Due    Hepatitis C Screening  Never done    DTaP/Tdap/Td series (1 - Tdap) Never done    Cervical cancer screen  Never done    Shingrix Vaccine Age 50> (1 of 2) Never done    Breast Cancer Screen Mammogram  10/01/2021    Bone Densitometry (Dexa) Screening  07/03/2022   . Coordination of Care:    1. \"Have you been to the ER, urgent care clinic since your last visit? Hospitalized since your last visit? \" Yes Where: [address]  Reason for visit: Stomach pain     2. \"Have you seen or consulted any other health care providers outside of the [address] [place] [address] since your last visit? \" No     3. For patients aged 39-70: Has the patient had a colonoscopy? Yes - no Care Gap present     If the patient is female:    4. For patients aged 41-77: Has the patient had a mammogram within the past 2 years?  Yes - no Care Gap present    5. For patients aged 21-65: Has the patient had a pap smear?  NA - based on age

## 2022-04-07 NOTE — PROGRESS NOTES
4/7/2022   3:48  PM    CTN reviewed patient chart and attempted to call for routine follow up. Message could not be left at either number for patient. First number stated that voicemail could not receive messages as it was unavailable. Then the second number stated that voice mail had not been set up yet- so voice mail could not be left. Will attempt to call at a later time.

## 2022-04-07 NOTE — PROGRESS NOTES
Subjective:   Scot Delong is a 61 y.o. female who was seen for Hospital Follow Up (diverticulitis )    Patient is here for hospital follow-up after being hospitalized twice within the last 2 months for acute diverticulitis, acute pancreatitis and dizziness. She was also found to have vertebral artery stenosis and was started on plavix. She had some diarrhea in the hospital and was started on oral vancomycin due to concerns for c diff colitis, but no stool samples were able to be obtained so she was treated empirically. She also had positive urine cultures but no antibiotics were placed on discharge due to her being treated for C diff colitis. Patient states that she had a reaction to the vancomycin and did not finish the course. She currently denies any diarrhea but has had constant nausea, vomiting and abdominal pain. She has had decreased PO intake for the last 5 days and also had a fever of 101 F yesterday. She denies any melena or hematochezia. She also states that her urinary symptoms are getting worse and has been having worsening right arm swelling from where her IV access was. Patient also had a CMV PCR test during her last hospitalization and it was negative. Her valganciclovir was discontinued during discharge. Home Medications    Medication Sig Start Date End Date Taking? Authorizing Provider   cefdinir (OMNICEF) 300 mg capsule  3/14/22   Provider, Historical   magic mouthwash (FIRST-MOUTHWASH BLM) -535-40 mg/30mL mwsh oral suspension Take 5 mL by mouth two (2) times daily as needed.  2/28/22   Provider, Historical   midodrine (PROAMATINE) 10 mg tablet  3/4/22   Provider, Historical   omeprazole (PRILOSEC) 20 mg capsule  3/14/22   Provider, Historical   traMADoL (ULTRAM) 50 mg tablet  3/14/22   Provider, Historical   famotidine (PEPCID) 20 mg tablet TAKE 1 TABLET BY MOUTH TWICE DAILY 3/8/22   Concepcion Wilson MD   levoFLOXacin (Levaquin) 500 mg tablet Take 0.5 Tablets by mouth every fourty-eight (48) hours. 2/10/22   Jermaine DANG DO   ondansetron (ZOFRAN ODT) 4 mg disintegrating tablet Take 1 Tablet by mouth every eight (8) hours as needed for Nausea or Nausea or Vomiting. 2/10/22   Sheridan Marte DO   montelukast (SINGULAIR) 10 mg tablet Take 1 Tablet by mouth daily. 1/21/22   Nabila Jackman MD   escitalopram oxalate (LEXAPRO) 10 mg tablet Take 1 Tablet by mouth daily. 1/20/22   Nabila Jackman MD   fluticasone-umeclidinium-vilanterol (Trelegy Ellipta) 100-62.5-25 mcg inhaler Take 1 Puff by inhalation daily. 1/20/22   Nabila Jackman MD   hyoscyamine SL (LEVSIN/SL) 0.125 mg SL tablet 1 Tablet by SubLINGual route every four (4) hours as needed (irritable bowel). 1/17/22   Nabila Jackman MD   Dexilant 60 mg CpDB capsule (delayed release) TAKE 1 CAPSULE BY MOUTH EVERY DAY 1/13/22   Nabila Jackman MD   meclizine (ANTIVERT) 25 mg tablet TAKE 1 TABLET BY MOUTH THREE TIMES DAILY FOR UP TO 10 DAYS AS NEEDED FOR DIZZINESS 1/10/22   Nabila Jackman MD   albuterol (PROVENTIL VENTOLIN) 2.5 mg /3 mL (0.083 %) nebu USE 1 VIAL VIA NEBULIZER THREE TIMES DAILY 12/23/21   Nabila Jackman MD   gabapentin (NEURONTIN) 100 mg capsule 2 capsules TID 12/22/21   Chaim Rondon MD   sucralfate (CARAFATE) 1 gram tablet TAKE 1 TABLET BY MOUTH FOUR TIMES DAILY 12/20/21   Nabila Jackman MD   amLODIPine (NORVASC) 10 mg tablet Take 1 Tablet by mouth daily. 12/20/21   Nabila Jackman MD   amiodarone (CORDARONE) 200 mg tablet TAKE 1 TABLET BY MOUTH EVERY DAY 9/18/21   Migue Horn MD   valGANciclovir (VALCYTE) 450 mg tablet TAKE 2 TABLETS BY MOUTH DAILY 9/18/21   Migue Horn MD   levothyroxine (SYNTHROID) 50 mcg tablet Take 1 Tablet by mouth daily. 8/24/21   Migue Horn MD   carvediloL (COREG) 6.25 mg tablet Take 1 Tablet by mouth two (2) times daily (with meals). 8/24/21   Migue Horn MD   losartan (COZAAR) 50 mg tablet Take 1 Tablet by mouth daily.  8/24/21   Migue Horn MD   calcitRIOL (ROCALTROL) 0.25 mcg capsule Take 1 Capsule by mouth as directed. Take on non dialysis days 7/19/21   Other, MD Abelardo   lidocaine-prilocaine (EMLA) topical cream as directed. 7/23/21   Other, MD Abelardo   RenaPlex-D 800 mcg-12.5 mg -2,000 unit tab Take 1 Tablet by mouth daily. 4/15/21   Marleni, MD Abelardo   sevelamer carbonate (RENVELA) 800 mg tab tab Take 800 mg by mouth three (3) times daily. 6/8/21   Other, MD Abelardo   zolpidem (AMBIEN) 5 mg tablet Take 5 mg by mouth nightly as needed. 4/5/21   Abelardo Yepez MD   simvastatin (ZOCOR) 20 mg tablet TAKE 1 TABLET BY MOUTH DAILY AS DIRECTED. 2/19/21   Zac Colón MD   aluminum & magnesium hydroxide-simethicone (Maalox Maximum Strength) 400-400-40 mg/5 mL suspension Take 10 mL by mouth every six (6) hours as needed for Indigestion. 9/17/20   Jie Tee MD   ESTRACE 0.01 % (0.1 mg/gram) vaginal cream INSERT 2 GRAMS INTO VAGINA EVERY MONDAY AND THURSDAY 4/11/17   Yaneli Baker MD   cranberry extract 425 mg cap 425 mg. 1/17/14   Provider, Historical   LORazepam (ATIVAN) 0.5 mg tablet Take 0.5 mg by mouth daily. Provider, Historical   fluticasone (FLONASE) 50 mcg/actuation nasal spray 1 Spray. Provider, Historical   EPINEPHrine (EPIPEN) 0.3 mg/0.3 mL injection 0.3 mg. 10/1/16   Provider, Historical      Allergies   Allergen Reactions    Aspirin Rash and Unknown (comments)    Bactrim [Sulfamethoxazole-Trimethoprim] Rash and Unknown (comments)    Bromfenac Rash and Unknown (comments)    Cefepime Other (comments)     Neurological reaction    Ceftriaxone Rash    Copper Rash    Ibuprofen Rash and Unknown (comments)    Ketorolac Tromethamine Rash and Unknown (comments)    Relafen [Nabumetone] Rash and Unknown (comments)    Rifampin Rash and Unknown (comments)    Vancomycin Rash and Unknown (comments)     Pt reports causes itching, takes benadryl prior to use. Pt denies anaphylaxis.     Requires benadryl with each vancomycin dose     Social History     Tobacco Use    Smoking status: Never Smoker    Smokeless tobacco: Never Used   Vaping Use    Vaping Use: Never used   Substance Use Topics    Alcohol use: No    Drug use: No        Review of Systems   All other systems reviewed and are negative. Objective:     Visit Vitals  BP (!) 148/82 (BP 1 Location: Right lower arm, BP Patient Position: Sitting, BP Cuff Size: Adult)   Pulse 73   Ht 5' 1\" (1.549 m)   Wt 155 lb (70.3 kg)   SpO2 96%   BMI 29.29 kg/m²        General: Somnolent  Lips/Mouth: dry lips and buccal mucosa, non-enlarged tonsils, pink throat  Neck: supple, no JVD, no lymphadenopathy, non-palpable thyroid  Chest/Lungs: clear breath sounds, no wheezing or crackles  Heart: normal rate, regular rhythm, no murmur  Abdomen: soft, non-distended, tenderness to palpation of epigastric and left lower quadrant, normal bowel sounds, no organomegaly, no masses  Extremities: Mild edema of right upper extremity with hematoma near previous right IV access   Skin: no active skin lesions      Assessment & Plan:     1. Acute pancreatitis, unspecified complication status, unspecified pancreatitis type  Suspect patient may have acute pancreatitis versus diverticulitis given her recent hospitalization. Was unable finish oral vancomycin due to allergies for c diff colitis presumably. Will send patient to ER due to lack of p.o. intake, fever and dehydration. Also needs to rule out UTI. May need possible EGD due to vomiting symptoms. She has a hx of CMV colitis and may need to rule out CMV esophagitis. Also was on chronic prednisone so candidiasis will need to be ruled out. 2. Dysphagia  Last EGD in 7/2020 showed a hiatal hernia, nonobstructing Schatzki's ring, and gastritis. Currently has decreased PO intake and vomiting. Will likely need repeat EGD to rule out CMV esophagitis vs candidal esophagitis from chronic prednisone use vs pill esophagitis from sevelmer use.  She had her valganciclovir discontinued at her recent hospitalization and per previous nephrology office visit note from Dr. Rufus Lemos in May 2019 which is the most recent office note I could find; he recommended keeping her on valganciclovir. 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 Was previously on valganciclovir for prophylaxis but was discontinued during recent hospitalization. She had a negative CMV viral load. Reviewed previous nephrology office note from Dr. Rufus Lemos in May 2019 which is the most recent office note I could find and he recommended keeping her on valganciclovir. Consider rechecking CMV viral load. May need EGD to rule out CMV esophagitis due to her vomiting symptoms. Has not had any recent transplant physician follow up. 4. Right arm swelling  Likely secondary to hematoma from IV access in the hospital.  May consider right upper extremity ultrasound to rule out DVT. I have discussed the diagnosis with the patient and the intended plan as seen in the above orders. The patient has received an after-visit summary and questions were answered concerning future plans. I have discussed medication side effects and warnings with the patient as well. I have reviewed the plan of care with the patient, accepted their input and they are in agreement with the treatment goals. Previous lab and imaging results were reviewed by me.        Gary Whitley MD  April 7, 2022

## 2022-04-07 NOTE — ED PROVIDER NOTES
EMERGENCY DEPARTMENT HISTORY AND PHYSICAL EXAM      Date: 4/7/2022  Patient Name: Gearlean Harada      History of Presenting Illness     Chief Complaint   Patient presents with    Urgency    Urinary Pain       History Provided By: Patient    HPI: Gearlean Harada, 61 y.o. female with a past medical history significant for hypertension, asthma, migraine, ulcer, hypercholesterolemia, dyspepsia, dialysis patient with CKD, renal transplant in 2002 that is sent from Dr. Severo Marsh office for dysuria, weakness, poor appetite for several weeks . The patient states that she has been admitted to multiple hospitals including [facility] but she still feels like they have not helped her concerns. Most recently discharged on Levofloxacin for UTI. Her current concerns appear to be chronic. She is dialyzed Mondays and Fridays and can still urinate. She has no pain over her transplant site and has not had a fever. There are no other complaints, changes, or physical findings at this time. PCP: Ashwin Littlejohn MD    Current Facility-Administered Medications   Medication Dose Route Frequency Provider Last Rate Last Admin    sodium chloride 0.9 % bolus infusion 500 mL  500 mL IntraVENous ONCE Greta Bean MD         Current Outpatient Medications   Medication Sig Dispense Refill    cefdinir (OMNICEF) 300 mg capsule       magic mouthwash (FIRST-MOUTHWASH BLM) -141-40 mg/30mL mwsh oral suspension Take 5 mL by mouth two (2) times daily as needed.  midodrine (PROAMATINE) 10 mg tablet       omeprazole (PRILOSEC) 20 mg capsule       traMADoL (ULTRAM) 50 mg tablet       famotidine (PEPCID) 20 mg tablet TAKE 1 TABLET BY MOUTH TWICE DAILY 30 Tablet 1    levoFLOXacin (Levaquin) 500 mg tablet Take 0.5 Tablets by mouth every fourty-eight (48) hours. 1 Tablet 0    ondansetron (ZOFRAN ODT) 4 mg disintegrating tablet Take 1 Tablet by mouth every eight (8) hours as needed for Nausea or Nausea or Vomiting.  10 Tablet 0    montelukast (SINGULAIR) 10 mg tablet Take 1 Tablet by mouth daily. 90 Tablet 0    escitalopram oxalate (LEXAPRO) 10 mg tablet Take 1 Tablet by mouth daily. 90 Tablet 0    fluticasone-umeclidinium-vilanterol (Trelegy Ellipta) 100-62.5-25 mcg inhaler Take 1 Puff by inhalation daily. 60 Each 5    hyoscyamine SL (LEVSIN/SL) 0.125 mg SL tablet 1 Tablet by SubLINGual route every four (4) hours as needed (irritable bowel). 30 Tablet 5    Dexilant 60 mg CpDB capsule (delayed release) TAKE 1 CAPSULE BY MOUTH EVERY DAY 30 Capsule 5    meclizine (ANTIVERT) 25 mg tablet TAKE 1 TABLET BY MOUTH THREE TIMES DAILY FOR UP TO 10 DAYS AS NEEDED FOR DIZZINESS 21 Tablet 0    albuterol (PROVENTIL VENTOLIN) 2.5 mg /3 mL (0.083 %) nebu USE 1 VIAL VIA NEBULIZER THREE TIMES DAILY 90 mL 3    gabapentin (NEURONTIN) 100 mg capsule 2 capsules  Capsule 0    sucralfate (CARAFATE) 1 gram tablet TAKE 1 TABLET BY MOUTH FOUR TIMES DAILY 360 Tablet 0    amLODIPine (NORVASC) 10 mg tablet Take 1 Tablet by mouth daily. 30 Tablet 1    amiodarone (CORDARONE) 200 mg tablet TAKE 1 TABLET BY MOUTH EVERY DAY 90 Tablet 2    valGANciclovir (VALCYTE) 450 mg tablet TAKE 2 TABLETS BY MOUTH DAILY 90 Tablet 5    levothyroxine (SYNTHROID) 50 mcg tablet Take 1 Tablet by mouth daily. 90 Tablet 3    carvediloL (COREG) 6.25 mg tablet Take 1 Tablet by mouth two (2) times daily (with meals). 180 Tablet 2    losartan (COZAAR) 50 mg tablet Take 1 Tablet by mouth daily. 90 Tablet 3    calcitRIOL (ROCALTROL) 0.25 mcg capsule Take 1 Capsule by mouth as directed. Take on non dialysis days      lidocaine-prilocaine (EMLA) topical cream as directed.  RenaPlex-D 800 mcg-12.5 mg -2,000 unit tab Take 1 Tablet by mouth daily.  sevelamer carbonate (RENVELA) 800 mg tab tab Take 800 mg by mouth three (3) times daily.  zolpidem (AMBIEN) 5 mg tablet Take 5 mg by mouth nightly as needed.       simvastatin (ZOCOR) 20 mg tablet TAKE 1 TABLET BY MOUTH DAILY AS DIRECTED. 90 Tab 1    aluminum & magnesium hydroxide-simethicone (Maalox Maximum Strength) 400-400-40 mg/5 mL suspension Take 10 mL by mouth every six (6) hours as needed for Indigestion. 250 mL 0    ESTRACE 0.01 % (0.1 mg/gram) vaginal cream INSERT 2 GRAMS INTO VAGINA EVERY MONDAY AND THURSDAY 42.5 g 5    cranberry extract 425 mg cap 425 mg.  LORazepam (ATIVAN) 0.5 mg tablet Take 0.5 mg by mouth daily.  fluticasone (FLONASE) 50 mcg/actuation nasal spray 1 Spray.  EPINEPHrine (EPIPEN) 0.3 mg/0.3 mL injection 0.3 mg. Past History     Past Medical History:  Past Medical History:   Diagnosis Date    Anemia NEC     Arrhythmia     Asthma     Asthma     Burning with urination     frequent uti    Chronic kidney disease     dialysis    CMV (cytomegalovirus) antibody positive     [facility]     M/W/F    Dyspepsia and other specified disorders of function of stomach     stomach ulcer    Essential hypertension     GERD (gastroesophageal reflux disease)     Hypercholesteremia     Hypertension     Migraine     Obesity     Renal cyst 2002    kidney transplant     Ulcer        Past Surgical History:  Past Surgical History:   Procedure Laterality Date    COLONOSCOPY      Dr Chacha Oviedo  5yrs ago    ENDOSCOPY VISIT-OUTPATIENT      Dr Chacha Oviedo  5 yrs ago    ENDOSCOPY, COLON, DIAGNOSTIC      with Dr. Elex Lesches HX [address] [place] [address]  02/2021    HX GI      ulcer    HX HEENT      sinus surg    HX RENAL TRANSPLANT      HX TRANSPLANT      kidney transplant 2002    VASCULAR SURGERY PROCEDURE UNLIST      shunt in prep for dialysis       Family History:  Family History   Problem Relation Age of Onset    Colon Polyps Brother     Cancer Mother     Diabetes Father        Social History:  Social History     Tobacco Use    Smoking status: Never Smoker    Smokeless tobacco: Never Used   Vaping Use    Vaping Use: Never used   Substance Use Topics    Alcohol use: No    Drug use:  No Allergies: Allergies   Allergen Reactions    Aspirin Rash and Unknown (comments)    Bactrim [Sulfamethoxazole-Trimethoprim] Rash and Unknown (comments)    Bromfenac Rash and Unknown (comments)    Cefepime Other (comments)     Neurological reaction    Ceftriaxone Rash    Copper Rash    Ibuprofen Rash and Unknown (comments)    Ketorolac Tromethamine Rash and Unknown (comments)    Relafen [Nabumetone] Rash and Unknown (comments)    Rifampin Rash and Unknown (comments)    Vancomycin Rash and Unknown (comments)     Pt reports causes itching, takes benadryl prior to use. Pt denies anaphylaxis. Requires benadryl with each vancomycin dose         Review of Systems     Review of Systems   Constitutional: Positive for appetite change. Negative for chills and fever. HENT: Negative for trouble swallowing. Eyes: Negative for visual disturbance. Respiratory: Negative for cough and shortness of breath. Cardiovascular: Negative for chest pain and palpitations. Gastrointestinal: Positive for abdominal pain. Negative for nausea and vomiting. Genitourinary: Positive for dysuria. Negative for flank pain and urgency. Musculoskeletal: Negative for arthralgias and myalgias. Skin: Negative for rash. Neurological: Negative for headaches. Psychiatric/Behavioral: Negative for confusion. Physical Exam     Physical Exam  Vitals and nursing note reviewed. Constitutional:       General: She is not in acute distress. Appearance: She is not ill-appearing or toxic-appearing. HENT:      Mouth/Throat:      Pharynx: Oropharynx is clear. Eyes:      Conjunctiva/sclera: Conjunctivae normal.      Pupils: Pupils are equal, round, and reactive to light. Cardiovascular:      Rate and Rhythm: Normal rate and regular rhythm. Heart sounds: Normal heart sounds. Pulmonary:      Effort: Pulmonary effort is normal.   Abdominal:      Palpations: Abdomen is soft. Tenderness:  There is no abdominal tenderness. Comments: RLQ transplant site w/o pain   Musculoskeletal:      Cervical back: Neck supple. Right lower leg: No edema. Left lower leg: No edema. Comments: Left biceps dialysis fistula w thrill. Neurological:      Mental Status: She is alert and oriented to person, place, and time. Psychiatric:         Behavior: Behavior normal.         Lab and Diagnostic Study Results     Labs -   No results found for this or any previous visit (from the past 12 hour(s)). Radiologic Studies -   [unfilled]  CT Results  (Last 48 hours)    None        CXR Results  (Last 48 hours)    None          Medical Decision Making and ED Course   - I am the first and primary provider for this patient AND AM THE PRIMARY PROVIDER OF RECORD. - I reviewed the vital signs, available nursing notes, past medical history, past surgical history, family history and social history. - Initial assessment performed. The patients presenting problems have been discussed, and the staff are in agreement with the care plan formulated and outlined with them. I have encouraged them to ask questions as they arise throughout their visit. Vital Signs-Reviewed the patient's vital signs. Patient Vitals for the past 12 hrs:   Temp Pulse Resp BP SpO2   04/07/22 1619 98.4 °F (36.9 °C) 71 20 (!) 137/59 99 %       Records Reviewed: Nursing Notes    ED Course:   7pm: Discussed and signed out to Dr Navarro Kate        Consultations:       Consultations:     Procedures and Critical Care           Disposition     Disposition: Admit    Diagnosis     Clinical Impression:  UTI  General Weakness  Renal transplant  Attestations:    Ngozi Raymond MD    Please note that this dictation was completed with [email or web address], the computer voice recognition software. Quite often unanticipated grammatical, syntax, homophones, and other interpretive errors are inadvertently transcribed by the computer software. Please disregard these errors. Please excuse any errors that have escaped final proofreading. Thank you.

## 2022-04-07 NOTE — ED TRIAGE NOTES
Patient recently discharged from the hospital and has become weak and not eating or drinking for approximately 3 days. She was seen by her PCP today for a hospital follow-up and was told to go to the ER for evaluation. The patient reports having a fever yesterday when checked by home health nurse. She c/o dysuria, frequency, and urgency.

## 2022-04-07 NOTE — ED NOTES
Multiple attempts made for blood specimen collection and IV placement without success from nursing staff and . MD aware.

## 2022-04-07 NOTE — Clinical Note
Patient Class[de-identified] OBSERVATION [104]   Type of Bed: Telemetry [19]   Cardiac Monitoring Required?: Yes   Reason for Observation: monitoring/treatment   Admitting Diagnosis: UTI (urinary tract infection) [946639]   Admitting Diagnosis: Weakness [817573]   Admitting Diagnosis: Nausea [512594]   Admitting Physician: Mazin Brunson [[id number]]   Attending Physician: Mazin Brunson [[id number]]

## 2022-04-08 VITALS
WEIGHT: 155 LBS | TEMPERATURE: 98 F | DIASTOLIC BLOOD PRESSURE: 66 MMHG | SYSTOLIC BLOOD PRESSURE: 142 MMHG | HEIGHT: 61 IN | OXYGEN SATURATION: 99 % | RESPIRATION RATE: 18 BRPM | HEART RATE: 72 BPM | BODY MASS INDEX: 29.27 KG/M2

## 2022-04-08 LAB
ANION GAP SERPL CALC-SCNC: 16 MMOL/L
BACTERIA SPEC CULT: NORMAL
BUN SERPL-MCNC: 39 MG/DL (ref 9–21)
BUN/CREAT SERPL: 4
CA-I BLD-MCNC: 7.8 MG/DL (ref 8.5–10.5)
CHLORIDE SERPL-SCNC: 99 MMOL/L (ref 94–111)
CO2 SERPL-SCNC: 21 MMOL/L (ref 21–33)
COLONY COUNT,CNT: NORMAL
CREAT SERPL-MCNC: 9.6 MG/DL (ref 0.7–1.2)
ERYTHROCYTE [DISTWIDTH] IN BLOOD BY AUTOMATED COUNT: 21.7 % (ref 11.6–14.5)
GLUCOSE SERPL-MCNC: 75 MG/DL (ref 70–110)
HCT VFR BLD AUTO: 34.5 % (ref 35–45)
HGB BLD-MCNC: 10 G/DL (ref 12–16)
MAGNESIUM SERPL-MCNC: 2.1 MG/DL (ref 1.7–2.8)
MCH RBC QN AUTO: 31.5 PG (ref 24–34)
MCHC RBC AUTO-ENTMCNC: 29 G/DL (ref 31–37)
MCV RBC AUTO: 108.8 FL (ref 78–100)
NRBC # BLD: 0 K/UL (ref 0–0.01)
NRBC BLD-RTO: 0 PER 100 WBC
PLATELET # BLD AUTO: 162 K/UL (ref 135–420)
PMV BLD AUTO: 9.4 FL (ref 9.2–11.8)
POTASSIUM SERPL-SCNC: 4.6 MMOL/L (ref 3.2–5.1)
RBC # BLD AUTO: 3.17 M/UL (ref 4.2–5.3)
SODIUM SERPL-SCNC: 136 MMOL/L (ref 135–145)
SPECIAL REQUESTS,SREQ: NORMAL
WBC # BLD AUTO: 3.2 K/UL (ref 4.6–13.2)

## 2022-04-08 PROCEDURE — 74011250636 HC RX REV CODE- 250/636: Performed by: INTERNAL MEDICINE

## 2022-04-08 PROCEDURE — 96372 THER/PROPH/DIAG INJ SC/IM: CPT

## 2022-04-08 PROCEDURE — 36415 COLL VENOUS BLD VENIPUNCTURE: CPT

## 2022-04-08 PROCEDURE — 85027 COMPLETE CBC AUTOMATED: CPT

## 2022-04-08 PROCEDURE — G0378 HOSPITAL OBSERVATION PER HR: HCPCS

## 2022-04-08 PROCEDURE — 80048 BASIC METABOLIC PNL TOTAL CA: CPT

## 2022-04-08 PROCEDURE — 96376 TX/PRO/DX INJ SAME DRUG ADON: CPT

## 2022-04-08 PROCEDURE — 97166 OT EVAL MOD COMPLEX 45 MIN: CPT

## 2022-04-08 PROCEDURE — 83735 ASSAY OF MAGNESIUM: CPT

## 2022-04-08 PROCEDURE — 74011250637 HC RX REV CODE- 250/637: Performed by: NURSE PRACTITIONER

## 2022-04-08 PROCEDURE — 74011000250 HC RX REV CODE- 250: Performed by: NURSE PRACTITIONER

## 2022-04-08 PROCEDURE — 96375 TX/PRO/DX INJ NEW DRUG ADDON: CPT

## 2022-04-08 PROCEDURE — 74011250637 HC RX REV CODE- 250/637: Performed by: INTERNAL MEDICINE

## 2022-04-08 PROCEDURE — 74011250636 HC RX REV CODE- 250/636: Performed by: NURSE PRACTITIONER

## 2022-04-08 RX ORDER — CARVEDILOL 6.25 MG/1
6.25 TABLET ORAL 2 TIMES DAILY WITH MEALS
Status: DISCONTINUED | OUTPATIENT
Start: 2022-04-08 | End: 2022-04-08 | Stop reason: HOSPADM

## 2022-04-08 RX ORDER — AMIODARONE HYDROCHLORIDE 200 MG/1
200 TABLET ORAL DAILY
Status: DISCONTINUED | OUTPATIENT
Start: 2022-04-08 | End: 2022-04-08 | Stop reason: HOSPADM

## 2022-04-08 RX ORDER — CEPHALEXIN 250 MG/1
250 CAPSULE ORAL EVERY 12 HOURS
Qty: 13 CAPSULE | Refills: 0 | Status: SHIPPED | OUTPATIENT
Start: 2022-04-08 | End: 2022-04-12

## 2022-04-08 RX ORDER — LINEZOLID 2 MG/ML
600 INJECTION, SOLUTION INTRAVENOUS EVERY 12 HOURS
Status: DISCONTINUED | OUTPATIENT
Start: 2022-04-08 | End: 2022-04-08 | Stop reason: HOSPADM

## 2022-04-08 RX ORDER — SEVELAMER CARBONATE 800 MG/1
800 TABLET, FILM COATED ORAL 3 TIMES DAILY
Status: DISCONTINUED | OUTPATIENT
Start: 2022-04-08 | End: 2022-04-08 | Stop reason: HOSPADM

## 2022-04-08 RX ORDER — HEPARIN SODIUM 5000 [USP'U]/ML
5000 INJECTION, SOLUTION INTRAVENOUS; SUBCUTANEOUS EVERY 8 HOURS
Status: DISCONTINUED | OUTPATIENT
Start: 2022-04-08 | End: 2022-04-08 | Stop reason: HOSPADM

## 2022-04-08 RX ORDER — ESCITALOPRAM OXALATE 10 MG/1
10 TABLET ORAL DAILY
Status: DISCONTINUED | OUTPATIENT
Start: 2022-04-08 | End: 2022-04-08 | Stop reason: HOSPADM

## 2022-04-08 RX ORDER — AMLODIPINE BESYLATE 5 MG/1
10 TABLET ORAL DAILY
Status: DISCONTINUED | OUTPATIENT
Start: 2022-04-08 | End: 2022-04-08 | Stop reason: HOSPADM

## 2022-04-08 RX ORDER — TRAMADOL HYDROCHLORIDE 50 MG/1
50 TABLET ORAL
Status: DISCONTINUED | OUTPATIENT
Start: 2022-04-08 | End: 2022-04-08 | Stop reason: HOSPADM

## 2022-04-08 RX ORDER — LINEZOLID 600 MG/1
600 TABLET, FILM COATED ORAL 2 TIMES DAILY
Qty: 16 TABLET | Refills: 0 | Status: SHIPPED | OUTPATIENT
Start: 2022-04-08 | End: 2022-04-08 | Stop reason: SDUPTHER

## 2022-04-08 RX ORDER — MONTELUKAST SODIUM 10 MG/1
10 TABLET ORAL DAILY
Status: DISCONTINUED | OUTPATIENT
Start: 2022-04-08 | End: 2022-04-08 | Stop reason: HOSPADM

## 2022-04-08 RX ORDER — PANTOPRAZOLE SODIUM 40 MG/1
40 TABLET, DELAYED RELEASE ORAL
Status: DISCONTINUED | OUTPATIENT
Start: 2022-04-08 | End: 2022-04-08 | Stop reason: HOSPADM

## 2022-04-08 RX ORDER — LINEZOLID 600 MG/1
600 TABLET, FILM COATED ORAL 2 TIMES DAILY
Qty: 16 TABLET | Refills: 0 | Status: SHIPPED | OUTPATIENT
Start: 2022-04-08 | End: 2022-04-16

## 2022-04-08 RX ORDER — GABAPENTIN 100 MG/1
200 CAPSULE ORAL 3 TIMES DAILY
Status: DISCONTINUED | OUTPATIENT
Start: 2022-04-08 | End: 2022-04-08 | Stop reason: HOSPADM

## 2022-04-08 RX ORDER — LOSARTAN POTASSIUM 25 MG/1
50 TABLET ORAL DAILY
Status: DISCONTINUED | OUTPATIENT
Start: 2022-04-08 | End: 2022-04-08 | Stop reason: HOSPADM

## 2022-04-08 RX ORDER — ATORVASTATIN CALCIUM 10 MG/1
10 TABLET, FILM COATED ORAL
Status: DISCONTINUED | OUTPATIENT
Start: 2022-04-09 | End: 2022-04-08 | Stop reason: HOSPADM

## 2022-04-08 RX ORDER — MAG HYDROX/ALUMINUM HYD/SIMETH 200-200-20
10 SUSPENSION, ORAL (FINAL DOSE FORM) ORAL
Status: DISCONTINUED | OUTPATIENT
Start: 2022-04-08 | End: 2022-04-08 | Stop reason: HOSPADM

## 2022-04-08 RX ORDER — LEVOTHYROXINE SODIUM 50 UG/1
50 TABLET ORAL
Status: DISCONTINUED | OUTPATIENT
Start: 2022-04-08 | End: 2022-04-08 | Stop reason: HOSPADM

## 2022-04-08 RX ORDER — NYSTATIN 100000 [USP'U]/ML
500000 SUSPENSION ORAL 4 TIMES DAILY
Status: DISCONTINUED | OUTPATIENT
Start: 2022-04-08 | End: 2022-04-08 | Stop reason: HOSPADM

## 2022-04-08 RX ORDER — ALBUTEROL SULFATE 2.5 MG/.5ML
2.5 SOLUTION RESPIRATORY (INHALATION)
Status: DISCONTINUED | OUTPATIENT
Start: 2022-04-08 | End: 2022-04-08 | Stop reason: HOSPADM

## 2022-04-08 RX ORDER — LORAZEPAM 0.5 MG/1
0.5 TABLET ORAL DAILY
Status: DISCONTINUED | OUTPATIENT
Start: 2022-04-08 | End: 2022-04-08 | Stop reason: HOSPADM

## 2022-04-08 RX ORDER — SIMVASTATIN 20 MG/1
20 TABLET, FILM COATED ORAL
Status: DISCONTINUED | OUTPATIENT
Start: 2022-04-08 | End: 2022-04-08

## 2022-04-08 RX ORDER — ZOLPIDEM TARTRATE 5 MG/1
5 TABLET ORAL
Status: DISCONTINUED | OUTPATIENT
Start: 2022-04-08 | End: 2022-04-08 | Stop reason: HOSPADM

## 2022-04-08 RX ORDER — CEPHALEXIN 250 MG/1
250 CAPSULE ORAL EVERY 12 HOURS
Status: DISCONTINUED | OUTPATIENT
Start: 2022-04-08 | End: 2022-04-08 | Stop reason: HOSPADM

## 2022-04-08 RX ORDER — SUCRALFATE 1 G/1
1 TABLET ORAL
Status: DISCONTINUED | OUTPATIENT
Start: 2022-04-08 | End: 2022-04-08 | Stop reason: HOSPADM

## 2022-04-08 RX ADMIN — AMIODARONE HYDROCHLORIDE 200 MG: 200 TABLET ORAL at 08:53

## 2022-04-08 RX ADMIN — CARVEDILOL 6.25 MG: 6.25 TABLET, FILM COATED ORAL at 08:55

## 2022-04-08 RX ADMIN — ONDANSETRON 4 MG: 2 INJECTION INTRAMUSCULAR; INTRAVENOUS at 08:53

## 2022-04-08 RX ADMIN — HEPARIN SODIUM 5000 UNITS: 5000 INJECTION INTRAVENOUS; SUBCUTANEOUS at 00:48

## 2022-04-08 RX ADMIN — PANTOPRAZOLE SODIUM 40 MG: 40 TABLET, DELAYED RELEASE ORAL at 06:32

## 2022-04-08 RX ADMIN — AMLODIPINE BESYLATE 10 MG: 5 TABLET ORAL at 08:53

## 2022-04-08 RX ADMIN — SODIUM CHLORIDE, PRESERVATIVE FREE 10 ML: 5 INJECTION INTRAVENOUS at 00:49

## 2022-04-08 RX ADMIN — LINEZOLID 600 MG: 600 INJECTION, SOLUTION INTRAVENOUS at 08:52

## 2022-04-08 RX ADMIN — MONTELUKAST 10 MG: 10 TABLET, FILM COATED ORAL at 08:53

## 2022-04-08 RX ADMIN — LOSARTAN POTASSIUM 50 MG: 25 TABLET, FILM COATED ORAL at 08:53

## 2022-04-08 RX ADMIN — GABAPENTIN 200 MG: 100 CAPSULE ORAL at 08:53

## 2022-04-08 RX ADMIN — CEPHALEXIN 250 MG: 250 CAPSULE ORAL at 08:53

## 2022-04-08 RX ADMIN — LEVOTHYROXINE SODIUM 50 MCG: 0.05 TABLET ORAL at 06:32

## 2022-04-08 RX ADMIN — SUCRALFATE 1 G: 1 TABLET ORAL at 06:32

## 2022-04-08 RX ADMIN — SEVELAMER CARBONATE 800 MG: 800 TABLET, FILM COATED ORAL at 08:52

## 2022-04-08 RX ADMIN — SODIUM CHLORIDE, PRESERVATIVE FREE 10 ML: 5 INJECTION INTRAVENOUS at 06:10

## 2022-04-08 RX ADMIN — ONDANSETRON 4 MG: 2 INJECTION INTRAMUSCULAR; INTRAVENOUS at 00:48

## 2022-04-08 RX ADMIN — TRAMADOL HYDROCHLORIDE 50 MG: 50 TABLET, COATED ORAL at 03:08

## 2022-04-08 RX ADMIN — LORAZEPAM 0.5 MG: 0.5 TABLET ORAL at 08:53

## 2022-04-08 RX ADMIN — HEPARIN SODIUM 5000 UNITS: 5000 INJECTION INTRAVENOUS; SUBCUTANEOUS at 08:54

## 2022-04-08 NOTE — PROGRESS NOTES
0700- Received care of pt. Pt complains of nausea. VS obtained and assessment completed. 7354- Morning meds given. PRN Zofran as well. PT had no reaction to antibiotics. 1200- PT given lunch and taken to bathroom. 1320- Fem line removed. Pt discharge instructions gone over. 1400- Pt picked up.

## 2022-04-08 NOTE — ASSESSMENT & PLAN NOTE
-Came to ED with c/o dysuria  -CT abdomen showed bladder thickening, suggestive of Cystitis  -Given 750mg Levaquin in ED, pharmacy consult to continue Abx.  -Urine culture pending  -UA: -Nit, +LE, +epithelial, slight bacteria

## 2022-04-08 NOTE — PROGRESS NOTES
Care Management Interventions  PCP Verified by CM: Yes  Last Visit to PCP: 04/07/22  Mode of Transport at Discharge: Other (see comment) ([facility])  Transition of Care Consult (CM Consult): Discharge Planning  Discharge Durable Medical Equipment: No  Health Maintenance Reviewed: Yes  Physical Therapy Consult: Yes  Occupational Therapy Consult: No  Speech Therapy Consult: No  Support Systems: Child(lilibeth)  Confirm Follow Up Transport: Family  The Plan for Transition of Care is Related to the Following Treatment Goals : Discharge planning with transition to home with family assistance. The Patient and/or Patient Representative was Provided with a Choice of Provider and Agrees with the Discharge Plan?: Yes  Name of the Patient Representative Who was Provided with a Choice of Provider and Agrees with the Discharge Plan: PT and daughter, Stephanie Pierce of Choice List was Provided with Basic Dialogue that Supports the Patient's Individualized Plan of Care/Goals, Treatment Preferences and Shares the Quality Data Associated with the Providers?: Yes  Discharge Location  Patient Expects to be Discharged to[de-identified] Home with family assistance     Chart reviewed and pt's daughter, Marie Wren interviewed. Pt placed in OBS status for abd pain and generalized weakness. PMH: Anemia, Arrhythmia, Asthma, Frequent UTI, CKD, CMV antibody positive, ESRD-[facility], M-W-F. Dyspepsia and other specified disorders of function of stomach, Ess hypertension, GERD, Hypercholesteremia, HTN, Migraine,Renal Cyst, Ulcer. PSH:  Colonoscopy, Endoscopy, Cholecystectomy, Sinus surg, renal transplant 2002, and vascular surgery for dialysis  PT HAS MULTIPLE ALERGIES. DX;  UTI, Weakness, Nausea, Ess HTN, HLD, Atrial Fib, ESRD, Hypothroidism, GERD. POC: Plan of care was presented to pt's daughter to which she agreed to for dialysis after discharge at [facility] at 14:30. DTR will pick pt up at 1400 and transport Mrs. Mami Zhang to her appointment. She will discharge with family assistance and appointments for follow-up.

## 2022-04-08 NOTE — PROGRESS NOTES
Hospitalist Progress Note             Date of Service:  9389  NAME:  Simone Tello  :  1955  MRN:  [id number]    Assessment / Plan:  UTI (urinary tract infection)- complicated uti, see my note from earlier today  -reviewed old cultures, discussed symptoms with pt  - trial zyvox and keflex, if tolerates and is able to keep liquids down, she can leave today after HD     Weakness  -PT/OT consult  -Initiate and maintain safety precautions     Nausea  -PRN Zofran  -Encourage PO intake  -CT Abd revealed bladder wall thickening     Essential hypertension  -Chronic  -Continue Coreg, Norvasc, Cozaar  -Monitor VS per unit protocol     Hyperlipidemia  -Chronic issue  -Continue Statin     Atrial fibrillation (HCC)  -Chronic issue  -Continue Amiodarone,Coreg.      ESRD (end stage renal disease) on dialysis ([facility]  -Chronic issue  -HD per Nephrology  -Monday/Friday     Hypothyroidism  -Chronic issue  -Cont Synthroid     GERD (gastroesophageal reflux disease)  -Chronic issue  -Continue dexilant  -PRN Mylanta        Code Status: Full Code  Surrogate Decision Maker: See chart for details           Hospital Problems  Date Reviewed: 2021          Codes Class Noted POA    Nausea ICD-10-CM: R11.0  ICD-9-CM: 787.02  2022 Yes        Weakness ICD-10-CM: R53.1  ICD-9-CM: 780.79  2022 Yes        Atrial fibrillation ([facility] ICD-10-CM: I48.91  ICD-9-CM: 427.31  2021 Yes        ESRD (end stage renal disease) on dialysis [facility]) ICD-10-CM: N18.6, Z99.2  ICD-9-CM: 585.6, V45.11  2020 Yes        Hypothyroidism ICD-10-CM: E03.9  ICD-9-CM: 244.9  2020 Yes        GERD (gastroesophageal reflux disease) ICD-10-CM: K21.9  ICD-9-CM: 530.81  Unknown Yes        Essential hypertension ICD-10-CM: I10  ICD-9-CM: 401.9  2013 Yes        Hyperlipidemia ICD-10-CM: E78.5  ICD-9-CM: 272.4  2013 Yes        UTI (urinary tract infection) ICD-10-CM: N39.0  ICD-9-CM: 599.0  8/21/2013 Yes                Review of Systems:   A comprehensive review of systems was negative except for that written in the HPI.   persistant nausea and vomiting, feels certain this is from uti, she has had similar in past      Vital Signs:    Last 24hrs VS reviewed since prior progress note. Most recent are:  Visit Vitals  BP (!) 142/66   Pulse 70   Temp 98 °F (36.7 °C)   Resp 18   Ht 5' 1\" (1.549 m)   Wt 70.3 kg (155 lb)   SpO2 99%   BMI 29.29 kg/m²         Intake/Output Summary (Last 24 hours) at 4/8/2022 1006  Last data filed at 4/8/2022 0319  Gross per 24 hour   Intake 1270 ml   Output --   Net 1270 ml        Physical Examination:     General:          Alert, cooperative, no distress, appears stated age. HEENT:           Atraumatic, anicteric sclerae, pink conjunctivae                          No oral ulcers, mucosa moist, throat clear, dentition fair  Neck:               Supple, symmetrical,  thyroid: non tender  Lungs:             Clear to auscultation bilaterally. No Wheezing or Rhonchi. No rales. Chest wall:      No tenderness  No Accessory muscle use. Heart:              Regular  rhythm,  No  murmur   No edema  Abdomen:        Soft, TTP. Not distended. Bowel sounds normal  Extremities:     No cyanosis. No clubbing,                            Skin turgor normal, Capillary refill normal, Radial dial pulse 2+  Skin:                Not pale. Not Jaundiced  No rashes   Psych:             Good insight. Not depressed. Not anxious or agitated. Neurologic:      EOMs intact. No facial asymmetry. No aphasia or slurred speech. Symmetrical strength, Sensation grossly intact. Alert and oriented X 4.         Data Review:    Review and/or order of clinical lab test  Review and/or order of tests in the radiology section of CPT  Review and/or order of tests in the medicine section of CPT      Labs:     Recent Labs     04/08/22  0400 04/07/22 2055   WBC 3.2* 3.9* HGB 10.0* 9.3*   HCT 34.5* 31.0*    202     Recent Labs     04/08/22  0400 04/07/22 2055    137   K 4.6 4.2   CL 99 96   CO2 21 25   BUN 39* 39*   CREA 9.60* 9.20*   GLU 75 73   CA 7.8* 7.9*   MG 2.1 2.1     Recent Labs     04/07/22 2055   ALT 17      TBILI 0.8   TP 6.2   ALB 3.4*   GLOB 2.8   LPSE 49     No results for input(s): INR, PTP, APTT, INREXT in the last 72 hours. No results for input(s): FE, TIBC, PSAT, FERR in the last 72 hours. Lab Results   Component Value Date/Time    Folate 23.4 (H) 06/13/2021 10:45 AM      No results for input(s): PH, PCO2, PO2 in the last 72 hours. No results for input(s): CPK, CKNDX, TROIQ in the last 72 hours.     No lab exists for component: CPKMB  Lab Results   Component Value Date/Time    Cholesterol, total 129 01/20/2022 03:46 PM    HDL Cholesterol 76 (H) 01/20/2022 03:46 PM    LDL, calculated 38.2 01/20/2022 03:46 PM    Triglyceride 74 01/20/2022 03:46 PM    CHOL/HDL Ratio 1.7 01/20/2022 03:46 PM     Lab Results   Component Value Date/Time    Glucose (POC) 77 02/10/2022 08:54 AM    Glucose (POC) 128 (H) 12/24/2021 08:38 AM    Glucose (POC) 84 08/10/2021 04:12 AM     Lab Results   Component Value Date/Time    Color Yellow 04/07/2022 04:30 PM    Appearance Cloudy 04/07/2022 04:30 PM    Specific gravity 1.011 04/07/2022 04:30 PM    pH (UA) >8.5 (H) 04/07/2022 04:30 PM    Protein 300 (A) 04/07/2022 04:30 PM    Glucose Negative 04/07/2022 04:30 PM    Ketone Trace (A) 04/07/2022 04:30 PM    Bilirubin Negative 04/07/2022 04:30 PM    Urobilinogen 0.2 04/07/2022 04:30 PM    Nitrites Negative 04/07/2022 04:30 PM    Leukocyte Esterase Large (A) 04/07/2022 04:30 PM    Epithelial cells Many 04/07/2022 04:30 PM    Bacteria SLIGHT 04/07/2022 04:30 PM    WBC >100 (H) 04/07/2022 04:30 PM    RBC 5-10 04/07/2022 04:30 PM         Medications Reviewed:     Current Facility-Administered Medications   Medication Dose Route Frequency    albuterol CONCENTRATE 2.5mg/0.5 mL neb soln  2.5 mg Nebulization Q4H PRN    alum-mag hydroxide-simeth (MYLANTA) oral suspension 10 mL  10 mL Oral Q6H PRN    amiodarone (CORDARONE) tablet 200 mg  200 mg Oral DAILY    amLODIPine (NORVASC) tablet 10 mg  10 mg Oral DAILY    carvediloL (COREG) tablet 6.25 mg  6.25 mg Oral BID WITH MEALS    pantoprazole (PROTONIX) tablet 40 mg  40 mg Oral ACB    [Held by provider] escitalopram oxalate (LEXAPRO) tablet 10 mg  10 mg Oral DAILY    fluticasone-umeclidinium-vilanterol (TRELEGY ELLIPTA) inhaler 1 Puff  1 Puff Inhalation DAILY    gabapentin (NEURONTIN) capsule 200 mg  200 mg Oral TID    levothyroxine (SYNTHROID) tablet 50 mcg  50 mcg Oral ACB    LORazepam (ATIVAN) tablet 0.5 mg  0.5 mg Oral DAILY    losartan (COZAAR) tablet 50 mg  50 mg Oral DAILY    montelukast (SINGULAIR) tablet 10 mg  10 mg Oral DAILY    sevelamer carbonate (RENVELA) tab 800 mg  800 mg Oral TID    sucralfate (CARAFATE) tablet 1 g  1 g Oral AC&HS    [Held by provider] traMADoL (ULTRAM) tablet 50 mg  50 mg Oral Q6H PRN    zolpidem (AMBIEN) tablet 5 mg  5 mg Oral QHS PRN    [START ON 4/9/2022] atorvastatin (LIPITOR) tablet 10 mg  10 mg Oral QHS    heparin (porcine) injection 5,000 Units  5,000 Units SubCUTAneous Q8H    linezolid in dextrose 5% (ZYVOX) IVPB premix in D5W 600 mg  600 mg IntraVENous Q12H    cephALEXin (KEFLEX) capsule 250 mg  250 mg Oral Q12H    sodium chloride (NS) flush 5-40 mL  5-40 mL IntraVENous Q8H    sodium chloride (NS) flush 5-40 mL  5-40 mL IntraVENous PRN    acetaminophen (TYLENOL) tablet 650 mg  650 mg Oral Q6H PRN    Or    acetaminophen (TYLENOL) suppository 650 mg  650 mg Rectal Q6H PRN    polyethylene glycol (MIRALAX) packet 17 g  17 g Oral DAILY PRN    promethazine (PHENERGAN) tablet 12.5 mg  12.5 mg Oral Q6H PRN    Or    ondansetron (ZOFRAN) injection 4 mg  4 mg IntraVENous Q6H PRN     ______________________________________________________________________  EXPECTED LENGTH OF STAY: - - -  ACTUAL LENGTH OF STAY:          0                 Alissa Rocha MD

## 2022-04-08 NOTE — PROGRESS NOTES
Patient up to restroom with assistance. VS obtained and stable. PRN pain medications given for 10/10 abdomen pain. No other needs voiced.  CBWR

## 2022-04-08 NOTE — DISCHARGE SUMMARY
Discharge Summary       PATIENT ID: Deedee Child  MRN: [id number]   YOB: 1955    DATE OF ADMISSION: 4/7/2022  4:13 PM    DATE OF DISCHARGE: 04/08/22    PRIMARY CARE PROVIDER: Nabila Jackman MD     ATTENDING PHYSICIAN: Alana Grimes MD  DISCHARGING PROVIDER: Alana Grimes MD        CONSULTATIONS: IP CONSULT TO NEPHROLOGY    PROCEDURES/SURGERIES: * No surgery found *    [address] COURSE:   HISTORY OF PRESENT ILLNESS:     Randolph Pacheco is a 61 y.o. female with a PMH of ESRD/HD (Monday/Friday), HTN, HLD, Asthma, Renal Transplant in 2002 and Hypothyroidism who presents with c/o UTI, generalized weakness and N/V. She reports being in the hospital several times over the last couple months and states that she has never been fully recovered or back to her normal between these hospitalizations. She reports going to see Dr. Brian Jarvis today for dysuria, weakness, poor appetite and he suggested she come to the emergency room.      In the ER the patient had labs drawn which was relatively WNL except for her BUN/creatinine. Lactic acid was 0.8, procalcitonin was 0.73. Her UA was noted to be negative for nitrites, positive for LE, WBC and slight bacteria. IV access was unobtainable so the ER physician placed a Right Femoral TL. Blood and urine cultures were drawn. She was given 750mg Levaquin in the ED and 500ml fluid bolus. CT of the abdomen showed bladder thickening. Dr. Anisa Barry, on call for Dr. Rogelio Mg was notified and will see the patient in the am.      Patient was seen and examined by me, resting in bed with no s/s of acute distress. She denies CP, SOB but does c/o Nausea and abdominal pain.      Patient has been brought in for observation of UTI and generalized weakness. Her expected length of stay is 24 - 48 hours.         DISCHARGE DIAGNOSES / PLAN:      Assessment / Plan:  UTI (urinary tract infection)- complicated uti, see my note from earlier today  -reviewed old cultures, discussed symptoms with pt  - trial zyvox and keflex, if tolerates and is able to keep liquids down, she can leave today after HD     Weakness  -PT/OT consult  -Initiate and maintain safety precautions     Nausea  -PRN Zofran  -Encourage PO intake  -CT Abd revealed bladder wall thickening     Essential hypertension  -Chronic  -Continue Coreg, Norvasc, Cozaar  -Monitor VS per unit protocol     Hyperlipidemia  -Chronic issue  -Continue Statin     Atrial fibrillation (HCC)  -Chronic issue  -Continue Amiodarone,Coreg.      ESRD (end stage renal disease) on dialysis ([facility]  -Chronic issue  -HD per Nephrology  -Monday/Friday     Hypothyroidism  -Chronic issue  -Cont Synthroid     GERD (gastroesophageal reflux disease)  -Chronic issue  -Continue dexilant  -PRN Mylanta        Code Status: Full Code  Surrogate Decision Maker: See chart for details       Pt with difficult to treat uti, but many contraindications and allergies and hx resistant organism. Was given levofloxacin in ed, but both organisms on prior culture are resistant, and she is on amiodarone so cannot continue. Allx to cephalosporins, but no anaphalaxis, will try keflex under observed conditions, also allx to bactrim and vancomycin. She also has renal failure on HD. 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 Would like ot also cover ecoli, will use keflex and watch for reactions. If she can tolerate these meds, she can be discharged later today to follow cultures with her pcp. FOLLOW UP APPOINTMENTS:    Follow-up Information     Follow up With Specialties Details Why Contact Info    Tala Rooney MD Family Medicine On 4/11/2022  [facility] [address]  [phone]               DIET: renal      DISCHARGE MEDICATIONS:  Current Discharge Medication List      START taking these medications    Details   linezolid (Zyvox) 600 mg tablet Take 1 Tablet by mouth two (2) times a day for 8 days.   Qty: 16 Tablet, Refills: 0  Start date: 4/8/2022, End date: 4/16/2022      cephALEXin (KEFLEX) 250 mg capsule Take 1 Capsule by mouth every twelve (12) hours for 13 doses. Qty: 13 Capsule, Refills: 0  Start date: 4/8/2022, End date: 4/15/2022         CONTINUE these medications which have NOT CHANGED    Details   magic mouthwash (FIRST-MOUTHWASH BLM) -041-40 mg/30mL mwsh oral suspension Take 5 mL by mouth two (2) times daily as needed. ondansetron (ZOFRAN ODT) 4 mg disintegrating tablet Take 1 Tablet by mouth every eight (8) hours as needed for Nausea or Nausea or Vomiting. Qty: 10 Tablet, Refills: 0      montelukast (SINGULAIR) 10 mg tablet Take 1 Tablet by mouth daily. Qty: 90 Tablet, Refills: 0      fluticasone-umeclidinium-vilanterol (Trelegy Ellipta) 100-62.5-25 mcg inhaler Take 1 Puff by inhalation daily. Qty: 60 Each, Refills: 5      hyoscyamine SL (LEVSIN/SL) 0.125 mg SL tablet 1 Tablet by SubLINGual route every four (4) hours as needed (irritable bowel).   Qty: 30 Tablet, Refills: 5    Associated Diagnoses: Gastroesophageal reflux disease with esophagitis without hemorrhage      Dexilant 60 mg CpDB capsule (delayed release) TAKE 1 CAPSULE BY MOUTH EVERY DAY  Qty: 30 Capsule, Refills: 5    Associated Diagnoses: Gastroesophageal reflux disease with esophagitis without hemorrhage      meclizine (ANTIVERT) 25 mg tablet TAKE 1 TABLET BY MOUTH THREE TIMES DAILY FOR UP TO 10 DAYS AS NEEDED FOR DIZZINESS  Qty: 21 Tablet, Refills: 0    Associated Diagnoses: Dizziness      albuterol (PROVENTIL VENTOLIN) 2.5 mg /3 mL (0.083 %) nebu USE 1 VIAL VIA NEBULIZER THREE TIMES DAILY  Qty: 90 mL, Refills: 3    Associated Diagnoses: Mild intermittent asthma without complication      gabapentin (NEURONTIN) 100 mg capsule 2 capsules TID  Qty: 540 Capsule, Refills: 0    Associated Diagnoses: Facet arthropathy; DDD (degenerative disc disease), lumbar; Spinal stenosis of lumbar region, unspecified whether neurogenic claudication present; Lumbar neuritis      sucralfate (CARAFATE) 1 gram tablet TAKE 1 TABLET BY MOUTH FOUR TIMES DAILY  Qty: 360 Tablet, Refills: 0      amLODIPine (NORVASC) 10 mg tablet Take 1 Tablet by mouth daily. Qty: 30 Tablet, Refills: 1    Associated Diagnoses: Hypertension, unspecified type      amiodarone (CORDARONE) 200 mg tablet TAKE 1 TABLET BY MOUTH EVERY DAY  Qty: 90 Tablet, Refills: 2      valGANciclovir (VALCYTE) 450 mg tablet TAKE 2 TABLETS BY MOUTH DAILY  Qty: 90 Tablet, Refills: 5    Associated Diagnoses: ESRD (end stage renal disease) on dialysis (HCC)      levothyroxine (SYNTHROID) 50 mcg tablet Take 1 Tablet by mouth daily. Qty: 90 Tablet, Refills: 3      carvediloL (COREG) 6.25 mg tablet Take 1 Tablet by mouth two (2) times daily (with meals). Qty: 180 Tablet, Refills: 2    Associated Diagnoses: Hypertension, unspecified type      losartan (COZAAR) 50 mg tablet Take 1 Tablet by mouth daily. Qty: 90 Tablet, Refills: 3    Associated Diagnoses: Hypertension, unspecified type      calcitRIOL (ROCALTROL) 0.25 mcg capsule Take 1 Capsule by mouth as directed. Take on non dialysis days      RenaPlex-D 800 mcg-12.5 mg -2,000 unit tab Take 1 Tablet by mouth daily. sevelamer carbonate (RENVELA) 800 mg tab tab Take 800 mg by mouth three (3) times daily. zolpidem (AMBIEN) 5 mg tablet Take 5 mg by mouth nightly as needed. simvastatin (ZOCOR) 20 mg tablet TAKE 1 TABLET BY MOUTH DAILY AS DIRECTED. Qty: 90 Tab, Refills: 1      aluminum & magnesium hydroxide-simethicone (Maalox Maximum Strength) 400-400-40 mg/5 mL suspension Take 10 mL by mouth every six (6) hours as needed for Indigestion. Qty: 250 mL, Refills: 0      ESTRACE 0.01 % (0.1 mg/gram) vaginal cream INSERT 2 GRAMS INTO VAGINA EVERY MONDAY AND THURSDAY  Qty: 42.5 g, Refills: 5      cranberry extract 425 mg cap 425 mg. LORazepam (ATIVAN) 0.5 mg tablet Take 0.5 mg by mouth daily. fluticasone (FLONASE) 50 mcg/actuation nasal spray 1 Spray.       EPINEPHrine (EPIPEN) 0.3 mg/0.3 mL injection 0.3 mg. STOP taking these medications       escitalopram oxalate (LEXAPRO) 10 mg tablet Comments:   Reason for Stopping:         traMADoL (ULTRAM) 50 mg tablet Comments:   Reason for Stopping:                 NOTIFY YOUR PHYSICIAN FOR ANY OF THE FOLLOWING:   Fever over 101 degrees for 24 hours. Chest pain, shortness of breath, fever, chills, nausea, vomiting, diarrhea, change in mentation, falling, weakness, bleeding. Severe pain or pain not relieved by medications. Or, any other signs or symptoms that you may have questions about. DISPOSITION:    Home With:   OT  PT  HH  RN       Long term SNF/Inpatient Rehab    Independent/assisted living    Hospice    Other:       PATIENT CONDITION AT DISCHARGE:     Functional status    Poor     Deconditioned     Independent      Cognition     Lucid     Forgetful     Dementia      Catheters/lines (plus indication)    Jackson     PICC     PEG     None      Code status     Full code     DNR      PHYSICAL EXAMINATION AT DISCHARGE:  General:          Alert, cooperative, no distress, appears stated age. HEENT:           Atraumatic, anicteric sclerae, pink conjunctivae                          No oral ulcers, mucosa moist, throat clear, dentition fair  Neck:               Supple, symmetrical  Lungs:             Clear to auscultation bilaterally. No Wheezing or Rhonchi. No rales. Chest wall:      No tenderness  No Accessory muscle use. Heart:              Regular  rhythm,  No  murmur   No edema  Abdomen:        Soft, non-tender. Not distended. Bowel sounds normal  Extremities:     No cyanosis. No clubbing,                            Skin turgor normal, Capillary refill normal  Skin:                Not pale. Not Jaundiced  No rashes   Psych:             Not anxious or agitated.   Neurologic:      Alert, moves all extremities, answers questions appropriately and responds to commands       CHRONIC MEDICAL DIAGNOSES:  Problem List as of 4/8/2022 Date Reviewed: 12/22/2021          Codes Class Noted - Resolved    Nausea ICD-10-CM: R11.0  ICD-9-CM: 787.02  4/7/2022 - Present        Weakness ICD-10-CM: R53.1  ICD-9-CM: 780.79  4/7/2022 - Present        Fluid overload ICD-10-CM: E87.70  ICD-9-CM: 276.69  2/2/2022 - Present        Atypical chest pain ICD-10-CM: R07.89  ICD-9-CM: 786.59  12/24/2021 - Present        ESRD needing dialysis [facility]) ICD-10-CM: N18.6, Z99.2  ICD-9-CM: 585.6  12/24/2021 - Present        Mild persistent asthma with (acute) exacerbation ICD-10-CM: J45.31  ICD-9-CM: 493.92  12/24/2021 - Present        Intractable vomiting ICD-10-CM: R11.10  ICD-9-CM: 536.2  8/9/2021 - Present        Left leg pain ICD-10-CM: M79.605  ICD-9-CM: 729.5  7/14/2021 - Present        Acute hyperkalemia ICD-10-CM: E87.5  ICD-9-CM: 276.7  6/12/2021 - Present        Encounter for cholecystectomy ICD-10-CM: Z76.89  ICD-9-CM: V65.8  5/6/2021 - Present    Overview Signed 5/6/2021  9:13 AM by Jesus Gonzalez MD     7/2020             Acute left-sided low back pain without sciatica ICD-10-CM: M54.50  ICD-9-CM: 724.2  5/6/2021 - Present        Atrial fibrillation ([address] ICD-10-CM: I48.91  ICD-9-CM: 427.31  5/6/2021 - Present        Chronic anticoagulation ICD-10-CM: Z79.01  ICD-9-CM: V58.61  5/6/2021 - Present        Stomatitis ICD-10-CM: K12.1  ICD-9-CM: 528.00  3/2/2021 - Present        Thrush of mouth and esophagus (HCC) ICD-10-CM: B37.81, B37.0  ICD-9-CM: 112.84, 112.0  3/2/2021 - Present        ESRD (end stage renal disease) on dialysis [facility]) ICD-10-CM: N18.6, Z99.2  ICD-9-CM: 585.6, V45.11  12/28/2020 - Present        History of DVT (deep vein thrombosis) ICD-10-CM: A67.987  ICD-9-CM: V12.51  12/28/2020 - Present        Hypothyroidism ICD-10-CM: E03.9  ICD-9-CM: 244.9  12/24/2020 - Present        Vertigo ICD-10-CM: R42  ICD-9-CM: 780.4  12/24/2020 - Present        Calculus of gallbladder with acute cholecystitis without obstruction ICD-10-CM: K80.00  ICD-9-CM: 574.00  10/9/2020 - Present        Acute recurrent maxillary sinusitis ICD-10-CM: J01.01  ICD-9-CM: 461.0  8/6/2020 - Present        Ulcer ICD-10-CM: OSS5721  ICD-9-CM: 707.9  Unknown - Present        Obesity ICD-10-CM: E66.9  ICD-9-CM: 278.00  Unknown - Present        Migraine ICD-10-CM: Q06.236  ICD-9-CM: 346.90  Unknown - Present        Hypertension ICD-10-CM: I10  ICD-9-CM: 401.9  Unknown - Present        Hypercholesteremia ICD-10-CM: E78.00  ICD-9-CM: 272.0  Unknown - Present        GERD (gastroesophageal reflux disease) ICD-10-CM: K21.9  ICD-9-CM: 530.81  Unknown - Present        Burning with urination ICD-10-CM: R30.0  ICD-9-CM: 788.1  Unknown - Present    Overview Signed 2/13/2017 12:02 PM by Windy Navarro A     frequent uti             Arrhythmia ICD-10-CM: I49.9  ICD-9-CM: 427.9  Unknown - Present        Hyperkalemia ICD-10-CM: E87.5  ICD-9-CM: 276.7  11/13/2016 - Present        Pruritus ICD-10-CM: L29.9  ICD-9-CM: 698.9  9/29/2016 - Present        Chronic kidney disease, stage III (moderate) ([facility] ICD-10-CM: N18.30  ICD-9-CM: 585.3  9/27/2016 - Present        Recurrent urinary tract infection ICD-10-CM: N39.0  ICD-9-CM: 599.0  9/27/2016 - Present        Nausea and vomiting ICD-10-CM: R11.2  ICD-9-CM: 787.01  4/10/2016 - Present        Diverticulitis of intestine ICD-10-CM: K57.92  ICD-9-CM: 562.11  4/2/2016 - Present        Cystic disease of kidney ICD-10-CM: Q61.9  ICD-9-CM: 753.10  3/16/2015 - Present        Gastritis and duodenitis ICD-10-CM: K29.90  ICD-9-CM: 535.50  2/23/2015 - Present        Anemia ICD-10-CM: D64.9  ICD-9-CM: 285.9  11/10/2014 - Present        Disease due to gram-negative bacillus ICD-10-CM: A49.9  ICD-9-CM: 041.85  10/17/2014 - Present    Overview Signed 2/13/2017 11:58 AM by Rosmery LLANOS     Overview:   Acinetobacter baumannii Septicemia and UTI 10/14/2014 (cultures at [facility])             Fever ICD-10-CM: R50.9  ICD-9-CM: 780.60  10/14/2014 - Present        Drug-induced hyperglycemia ICD-10-CM: R73.9, T50.905A  ICD-9-CM: 790.29, E947.9  6/28/2014 - Present        Exacerbation of asthma ICD-10-CM: J45.901  ICD-9-CM: 493.92  6/25/2014 - Present        Systemic inflammatory response syndrome (SIRS) (HCC) ICD-10-CM: R65.10  ICD-9-CM: 995.90  6/23/2014 - Present        Kidney transplant rejection ICD-10-CM: T86.11  ICD-9-CM: 996.81  4/10/2014 - Present    Overview Signed 2/13/2017 11:58 AM by Kyle Arthur     Overview:   Collected: Aracelis Magaña Dr: Nelson aCldera MD    Case Number: [id number]  [address] [place] [address]    Case Number: [id number]    DIAGNOSIS:  ----------  ALLOGRAFT KIDNEY, NEEDLE BIOPSY (14 YEARS, 2 MONTHS):  - SUBOPTIMAL SPECIMEN: RENAL MEDULLA, INCLUDING DEEP MEDULLA WITH  FOCAL BENIGN UROTHELIAL EPITHELIUM. - MILD NEUTROPHILIC TUBULITIS WITH INTRALUMINAL NEUTROPHILS,  CORRELATE WITH URINE CULTURE TO RULE OUT BACTERIAL INFECTION. - MILD LYMPHOCYTIC TUBULITIS CONSISTENT WITH BORDERLINE CHANGE:  (\"SUSPICIOUS\" FOR ACUTE CELLULAR REJECTION) AS PER BANFF SCHEMA. - CONGESTED PERITUBULAR CAPILLARIES (NON-SPECIFIC), BUT RENAL  VEIN THROMBOSIS OR STENOSIS SHOULD BE EXCLUDED CLINICALLY. - FOCAL SMALL INTERSTITIAL COLLECTION OF CAST MATERIAL  (NON-SPECIFIC), BUT  URINARY OBSTRUCTION SHOULD BE EXCLUDED CLINICALLY. - NO DEFINITIVE STAINING FOR POLYOMAVIRUSES (SEE DESCRIPTION). - FOCAL C4D REACTIVITY ALONG PERITUBULAR CAPILLARY WALLS WITH  HIGH NON-SPECIFIC BACKGROUND STAINING; SIGNIFICANCE UNCERTAIN AND  CORRELATION WITH FLOW PRA IS SUGGESTED TO EXCLUDE EARLY HUMORAL  REJECTION. - SMALL VESSEL SCLEROSIS, MILD TO MODERATE TO SEVERE.  - TUBULAR ATROPHY AND INTERSTITIAL FIBROSIS CANNOT BE ADEQUATELY  ASSESSED  IN THE ABSENCE OF RENAL CORTEX.     Re-Bx - Collected: Radha Mix Dr: Nelson Caldera MD    Case Number: [id number]  [address] [place] [address]    Case Number: [id number]    DIAGNOSIS:  ----------  ALLOGRAFT KIDNEY, NEEDLE BIOPSY (14 YEARS, 2 MONTHS):  - BORDERLINE CHANGE (\"SUSPICIOUS\" FOR ACUTE CELLULAR REJECTION)  TO FOCAL  MILD ACUTE CELLULAR REJECTION (BANFF TYPE 1A) (SEE COMMENT). - MILD NEUTROPHILIC INTERSTITIAL INFLAMMATION, CORRELATE WITH  URINE CULTURE  TO RULE OUT SUPERIMPOSED BACTERIAL INFECTION. - SMALL INTERSTITIAL COLLECTIONS OF PAS-POSITIVE CAST MATERIAL  AND CYSTICALLY DILATED PACKER'S SPACE WITH PAS-POSITIVE  PROTEINACEOUS FILTRATE (SEE  COMMENT). - ACUTE ISCHEMIC-TYPE TUBULAR INJURY IS NOTED.  - FOCAL C4D REACTIVITY ALONG PERITUBULAR CAPILLARY WALLS;  SIGNIFICANCE  UNCERTAIN AND CORRELATION WITH FLOW PRA IS SUGGESTED TO EXCLUDE  EARLY  HUMORAL REJECTION. - NEGATIVE IMMUNOSTAIN FOR POLYOMAVIRUSES (BK, HOMER). - CHRONIC CHANGES: GLOBAL SCLEROSIS IN APPROXIMATELY 14 OUT OF 60  (23%)  GLOMERULI, FOCAL SEGMENTAL GLOMERULOSCLEROSIS IN APPROXIMATELY  2 OUT OF  46 (4%) NON-OBSOLESCENT GLOMERULI, MODERATE TO SEVERE  ARTERIOSCLEROSIS,  MILD TO MODERATE TO SEVERE ARTERIOLAR HYALINE SCLEROSIS, AND  MODERATE  INTERSTITIAL FIBROSIS/TUBULAR ATROPHY (SEE COMMENT).              Acute renal failure ([facility] ICD-10-CM: N17.9  ICD-9-CM: 584.9  4/7/2014 - Present        Renal transplant disorder ICD-10-CM: T86.10  ICD-9-CM: 996.81  4/7/2014 - Present        Systemic infection ([facility] ICD-10-CM: A41.9  ICD-9-CM: 038.9  1/7/2014 - Present        Asthma ICD-10-CM: J45.909  ICD-9-CM: 493.90  8/21/2013 - Present        Diverticular disease of large intestine ICD-10-CM: K57.30  ICD-9-CM: 562.10  8/21/2013 - Present        Essential hypertension ICD-10-CM: I10  ICD-9-CM: 401.9  8/21/2013 - Present        Seasonal allergic rhinitis due to pollen ICD-10-CM: J30.1  ICD-9-CM: 477.0  8/21/2013 - Present        Hyperlipidemia ICD-10-CM: E78.5  ICD-9-CM: 272.4  8/21/2013 - Present        UTI (urinary tract infection) ICD-10-CM: N39.0  ICD-9-CM: 599.0  8/21/2013 - Present        Fecal incontinence ICD-10-CM: R15.9  ICD-9-CM: 787.60 4/17/2013 - Present        History of kidney transplant ICD-10-CM: Z94.0  ICD-9-CM: V42.0  4/30/2012 - Present        CMV (cytomegalovirus) antibody positive ICD-10-CM: R76.8  ICD-9-CM: 795.79  4/30/2012 - Present    Overview Signed 2/13/2017 11:58 AM by Lavinia LLANOS     Overview:   Donor negative             Hematuria ICD-10-CM: R31.9  ICD-9-CM: 599.70  4/30/2012 - Present        Migraine without status migrainosus, not intractable ICD-10-CM: D24.595  ICD-9-CM: 346.90  2/24/2010 - Present        Renal cyst ICD-10-CM: N28.1  ICD-9-CM: 753.10  1/1/2002 - Present    Overview Signed 2/13/2017 12:01 PM by Lavinia LLANOS     kidney transplant              RESOLVED: Chronic obstructive pulmonary disease ([facility] ICD-10-CM: J44.9  ICD-9-CM: 857  8/21/2013 - 1/20/2022        RESOLVED: Gastroesophageal reflux disease ICD-10-CM: K21.9  ICD-9-CM: 530.81  8/21/2013 - 8/3/2021        RESOLVED: Adiposity ICD-10-CM: E66.9  ICD-9-CM: 278.00  2/1/2010 - 8/3/2021              Greater than 35 minutes were spent with the patient on counseling and coordination of care    Signed:   Seth Sellers MD  4/8/2022  1:03 PM

## 2022-04-08 NOTE — ED NOTES
8:47 PM rec'd pt in sign out, no iv access after mult attempt by rn, rt cvl placed  Pt piv infiltrated, no iv access, nursing unable to obtain on mult attempts. I d/w pt at length she agrees to cvl  Rt femoral line site used for ease of placement and dec risks. .  After pt signed consent and timeout done, I used full sterile barriers inc gown/gloves/mask/drapes, sterilized right mid inguinal region w chlorxedine and inserted 7 fr triple lumen cath using through 18 g needle using guidewire on first attempt without difficulty. All 3 ports flushed w 10 ml ns without difficulty. Line sewn into place x 2 and sterile dressing applied. Pt tolerated well.    I d/w admitting provider [facility] temporary nature of access, if needed greater than 24-48 hours recommend arrange for pt to have medport or picc line inserted and remove cvl.   11:02 PM tabitha may, to consult  11:02 PM d/w dr Gil Russo, reviewed history, to consult

## 2022-04-08 NOTE — PROGRESS NOTES
Received pt from ED to room #241. Pt AAOX3 . Skin w/d. Resp easy and nonlabored. Sat's 100% on room air. Telemetry monitor 2S #2 applied, denotes NSR with 1st degree AVB. HR 78. Pt has AV-Fistula in her left arm +thrill +bruit. Pt c/o nausea ans requesting something for nausea and abd pain. LIZABETH Martines NP present and made aware of pt's complaints. CBWR.

## 2022-04-08 NOTE — PROGRESS NOTES
The pt's established nephrologist is Dr Scott Nunez  I have asked the nurse to inform Dr Scott Nunez or his group.

## 2022-04-08 NOTE — PROGRESS NOTES
Talked with daughter regard Xyvox need. CM will cost drug out through her insurance [facility] [employer]. [phone], spoke with Frankey Burr, who cost the drug out for $3.99 for 1 month, however, the pt only needs 8 days, so CM feels confident that she can obtain the drug. MDT made aware of POC. CM following.

## 2022-04-08 NOTE — PROGRESS NOTES
Pt with difficult to treat uti, but many contraindications and allergies and hx resistant organism. Was given levofloxacin in ed, but both organisms on prior culture are resistant, and she is on amiodarone so cannot continue. Allx to cephalosporins, but no anaphalaxis, will try keflex under observed conditions, also allx to bactrim and vancomycin. She also has renal failure on HD. 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 Would like ot also cover ecoli, will use keflex and watch for reactions. If she can tolerate these meds, she can be discharged later today to follow cultures with her pcp.

## 2022-04-08 NOTE — ED NOTES
TRANSFER - OUT REPORT:    Verbal report given to Laurie(name) on Ajith Santos  being transferred to [address] [place] [address](unit) for routine progression of care       Report consisted of patients Situation, Background, Assessment and   Recommendations(SBAR). Information from the following report(s) ED Summary was reviewed with the receiving nurse. Lines:   Triple Lumen 04/07/22 Right Femoral (Active)   Central Line Being Utilized Yes 04/07/22 2045   Site Assessment Clean, dry, & intact 04/07/22 2045   Infiltration Assessment 0 04/07/22 2045   Dressing Status Clean, dry, & intact 04/07/22 2045        Opportunity for questions and clarification was provided.       Patient transported with:   Monitor  Tech

## 2022-04-08 NOTE — PROGRESS NOTES
OCCUPATIONAL THERAPY EVALUATION/DISCHARGE    Patient: Joseph Metz (29 y.o. female)  Date: 4/8/2022  Primary Diagnosis: UTI (urinary tract infection) [N39.0]  Weakness [R53.1]  Nausea [R11.0]       Precautions: NV      PLOF: lives at home with sister and was I with basic ADLs and mobility     ASSESSMENT AND RECOMMENDATIONS:  Based on the objective data described below, the patient presents with declines in basic ADLs and mobility . Skilled occupational therapy is not indicated at this time. Discharge Recommendations: None  Further Equipment Recommendations for Discharge: N/A      SUBJECTIVE:   Patient stated If was wasn't nauseous, Id be fine.     OBJECTIVE DATA SUMMARY:     Past Medical History:   Diagnosis Date    Anemia NEC     Arrhythmia     Asthma     Asthma     Burning with urination     frequent uti    Chronic kidney disease     dialysis    CMV (cytomegalovirus) antibody positive     Dialysis patient ([facility] [address]     M/W/F    Dyspepsia and other specified disorders of function of stomach     stomach ulcer    Essential hypertension     GERD (gastroesophageal reflux disease)     Hypercholesteremia     Hypertension     Migraine     Obesity     Renal cyst 2002    kidney transplant     Ulcer      Past Surgical History:   Procedure Laterality Date    COLONOSCOPY      Dr Tariq Gillespie  5yrs ago    ENDOSCOPY VISIT-OUTPATIENT      Dr Tariq Gillespie  5 yrs ago    ENDOSCOPY, COLON, DIAGNOSTIC      with Dr. Arnulfo Colindres HX CHOLECYSTECTOMY  02/2021    HX GI      ulcer    HX HEENT      sinus surg    HX RENAL TRANSPLANT      HX TRANSPLANT      kidney transplant 2002    VASCULAR SURGERY PROCEDURE UNLIST      shunt in prep for dialysis     Barriers to Learning/Limitations: None  Compensate with: visual, verbal, tactile, kinesthetic cues/model    Home Situation:   Home Situation  Home Environment: Private residence  One/Two Story Residence: One story  Living Alone: No  Support Systems: Child(lilibeth)  Patient Expects to be Discharged to[de-identified] Home with family assistance  Current DME Used/Available at Home: None  [x]     Right hand dominant   []     Left hand dominant    Cognitive/Behavioral Status:    A & O x 4; follows multi step commands              Skin: intact   Edema:none noted     Vision/Perceptual:       WFLs                               Coordination: BUE   WFLs             Balance:    normal     Strength: BUE  3/5                  Tone & Sensation: BUE  Normal                           Range of Motion: BUE  WFLs                             Functional Mobility and Transfers for ADLs:  Bed Mobility:   mod I            Transfers:   mod I                                     ADL Assessment:    pt able to follow commands and reports moving ok in bed. No skilled OT services indicated at present. Pain:  Pain level pre-treatment: 0/10   Pain level post-treatment: 0/10   Pain Intervention(s): Medication (see MAR); Rest, Ice, Repositioning  Response to intervention: Nurse notified, See doc flow    Activity Tolerance:   Good     Please refer to the flowsheet for vital signs taken during this treatment. After treatment:   [x]  Patient left in no apparent distress sitting up in chair  []  Patient left in no apparent distress in bed  [x]  Call bell left within reach  [x]  Nursing notified  [x]  Caregiver present  []  Bed alarm activated    COMMUNICATION/EDUCATION:   [x]      Role of Occupational Therapy in the acute care setting  [x]      Home safety education was provided and the patient/caregiver indicated understanding. []      Patient/family have participated as able and agree with findings and recommendations. []      Patient is unable to participate in plan of care at this time. Thank you for this referral.  Rj Partida MS, OTR/L          [place] Complexity: History: MEDIUM Complexity : Expanded review of history including physical, cognitive and psychosocial  history ;    Examination: MEDIUM Complexity : 3-5 performance deficits relating to physical, cognitive , or psychosocial skils that result in activity limitations and / or participation restrictions; Decision Making:MEDIUM Complexity : Patient may present with comorbidities that affect occupational performnce.  Miniml to moderate modification of tasks or assistance (eg, physical or verbal ) with assesment(s) is necessary to enable patient to complete evaluation

## 2022-04-08 NOTE — H&P
[id number]  [place]         NAME: Germaine Ramesh   :  1955   MRN:  [id number]     Date/Time:  2022 11:43 PM    Patient PCP: Malu Cowart MD       Subjective:   CHIEF COMPLAINT: Urinary pain, generalized weakness, N/V    HISTORY OF PRESENT ILLNESS:     Kenna German is a 61 y.o. female with a PMH of ESRD/HD (Monday/Friday), HTN, HLD, Asthma, Renal Transplant in  and Hypothyroidism who presents with c/o UTI, generalized weakness and N/V. She reports being in the hospital several times over the last couple months and states that she has never been fully recovered or back to her normal between these hospitalizations. She reports going to see Dr. Viri Kwon today for dysuria, weakness, poor appetite and he suggested she come to the emergency room. In the ER the patient had labs drawn which was relatively WNL except for her BUN/creatinine. Lactic acid was 0.8, procalcitonin was 0.73. Her UA was noted to be negative for nitrites, positive for LE, WBC and slight bacteria. IV access was unobtainable so the ER physician placed a Right Femoral TL. Blood and urine cultures were drawn. She was given 750mg Levaquin in the ED and 500ml fluid bolus. CT of the abdomen showed bladder thickening. Dr. Cody Calderon, on call for Dr. Sarai Maldonado was notified and will see the patient in the am.     Patient was seen and examined by me, resting in bed with no s/s of acute distress. She denies CP, SOB but does c/o Nausea and abdominal pain. Patient has been brought in for observation of UTI and generalized weakness. Her expected length of stay is 24 - 48 hours.      Past Medical History:   Diagnosis Date    Anemia NEC     Arrhythmia     Asthma     Asthma     Burning with urination     frequent uti    Chronic kidney disease     dialysis    CMV (cytomegalovirus) antibody positive     Dialysis patient ([facility] [address]     M/W/F    Dyspepsia and other specified disorders of function of stomach     stomach ulcer    Essential hypertension     GERD (gastroesophageal reflux disease)     Hypercholesteremia     Hypertension     Migraine     Obesity     Renal cyst 2002    kidney transplant     Ulcer         Past Surgical History:   Procedure Laterality Date    COLONOSCOPY      Dr Joyce Alpers  5yrs ago    ENDOSCOPY VISIT-OUTPATIENT      Dr Joyce Alpers  5 yrs ago    ENDOSCOPY, COLON, DIAGNOSTIC      with Dr. Dory Sykes  [facility]  02/2021    HX GI      ulcer    HX HEENT      sinus surg    HX RENAL TRANSPLANT      HX TRANSPLANT      kidney transplant 2002    VASCULAR SURGERY PROCEDURE UNLIST      shunt in prep for dialysis       Social History     Tobacco Use    Smoking status: Never Smoker    Smokeless tobacco: Never Used   Substance Use Topics    Alcohol use: No        Family History   Problem Relation Age of Onset    Colon Polyps Brother     Cancer Mother     Diabetes Father      Allergies   Allergen Reactions    Aspirin Rash and Unknown (comments)    Bactrim [Sulfamethoxazole-Trimethoprim] Rash and Unknown (comments)    Bromfenac Rash and Unknown (comments)    Cefepime Other (comments)     Neurological reaction    Ceftriaxone Rash    Copper Rash    Ibuprofen Rash and Unknown (comments)    Ketorolac Tromethamine Rash and Unknown (comments)    Relafen [Nabumetone] Rash and Unknown (comments)    Rifampin Rash and Unknown (comments)    Vancomycin Rash and Unknown (comments)     Pt reports causes itching, takes benadryl prior to use. Pt denies anaphylaxis. Requires benadryl with each vancomycin dose        Prior to Admission medications    Medication Sig Start Date End Date Taking? Authorizing Provider   cefdinir (OMNICEF) 300 mg capsule  3/14/22   Provider, Historical   magic mouthwash (FIRST-MOUTHWASH BLM) -381-40 mg/30mL mwsh oral suspension Take 5 mL by mouth two (2) times daily as needed.  2/28/22   Provider, Historical   midodrine (PROAMATINE) 10 mg tablet  3/4/22   Provider, Historical   omeprazole (PRILOSEC) 20 mg capsule  3/14/22   Provider, Historical   traMADoL (ULTRAM) 50 mg tablet  3/14/22   Provider, Historical   famotidine (PEPCID) 20 mg tablet TAKE 1 TABLET BY MOUTH TWICE DAILY 3/8/22   Andres Solitario MD   levoFLOXacin (Levaquin) 500 mg tablet Take 0.5 Tablets by mouth every fourty-eight (48) hours. 2/10/22   Margie DANG DO   ondansetron (ZOFRAN ODT) 4 mg disintegrating tablet Take 1 Tablet by mouth every eight (8) hours as needed for Nausea or Nausea or Vomiting. 2/10/22   Sheridan Cunningham DO   montelukast (SINGULAIR) 10 mg tablet Take 1 Tablet by mouth daily. 1/21/22   Andres Solitario MD   escitalopram oxalate (LEXAPRO) 10 mg tablet Take 1 Tablet by mouth daily. 1/20/22   Andres Solitario MD   fluticasone-umeclidinium-vilanterol (Trelegy Ellipta) 100-62.5-25 mcg inhaler Take 1 Puff by inhalation daily. 1/20/22   Andres Solitario MD   hyoscyamine SL (LEVSIN/SL) 0.125 mg SL tablet 1 Tablet by SubLINGual route every four (4) hours as needed (irritable bowel). 1/17/22   Andres Solitario MD   Dexilant 60 mg CpDB capsule (delayed release) TAKE 1 CAPSULE BY MOUTH EVERY DAY 1/13/22   Andres Solitario MD   meclizine (ANTIVERT) 25 mg tablet TAKE 1 TABLET BY MOUTH THREE TIMES DAILY FOR UP TO 10 DAYS AS NEEDED FOR DIZZINESS 1/10/22   Andres Solitario MD   albuterol (PROVENTIL VENTOLIN) 2.5 mg /3 mL (0.083 %) nebu USE 1 VIAL VIA NEBULIZER THREE TIMES DAILY 12/23/21   Andres Solitario MD   gabapentin (NEURONTIN) 100 mg capsule 2 capsules TID 12/22/21   Chaim Rondon MD   sucralfate (CARAFATE) 1 gram tablet TAKE 1 TABLET BY MOUTH FOUR TIMES DAILY 12/20/21   Andres Solitario MD   amLODIPine (NORVASC) 10 mg tablet Take 1 Tablet by mouth daily.  12/20/21   Andres Solitario MD   amiodarone (CORDARONE) 200 mg tablet TAKE 1 TABLET BY MOUTH EVERY DAY 9/18/21   Tata Monzon MD   valGANciclovir (VALCYTE) 450 mg tablet TAKE 2 TABLETS BY MOUTH DAILY 9/18/21 Zac Colón MD   levothyroxine (SYNTHROID) 50 mcg tablet Take 1 Tablet by mouth daily. 8/24/21   Zac Colón MD   carvediloL (COREG) 6.25 mg tablet Take 1 Tablet by mouth two (2) times daily (with meals). 8/24/21   Zac Colón MD   losartan (COZAAR) 50 mg tablet Take 1 Tablet by mouth daily. 8/24/21   Zac Colón MD   calcitRIOL (ROCALTROL) 0.25 mcg capsule Take 1 Capsule by mouth as directed. Take on non dialysis days 7/19/21   Marleni, MD Abelardo   lidocaine-prilocaine (EMLA) topical cream as directed. 7/23/21   Marleni, MD Abelardo   RenaPlex-D 800 mcg-12.5 mg -2,000 unit tab Take 1 Tablet by mouth daily. 4/15/21   Abelardo Yepez MD   sevelamer carbonate (RENVELA) 800 mg tab tab Take 800 mg by mouth three (3) times daily. 6/8/21   Abelardo Yepez MD   zolpidem (AMBIEN) 5 mg tablet Take 5 mg by mouth nightly as needed. 4/5/21   Abelardo Yepez MD   simvastatin (ZOCOR) 20 mg tablet TAKE 1 TABLET BY MOUTH DAILY AS DIRECTED. 2/19/21   Zac Colón MD   aluminum & magnesium hydroxide-simethicone (Maalox Maximum Strength) 400-400-40 mg/5 mL suspension Take 10 mL by mouth every six (6) hours as needed for Indigestion. 9/17/20   Jie Tee MD   ESTRACE 0.01 % (0.1 mg/gram) vaginal cream INSERT 2 GRAMS INTO VAGINA EVERY MONDAY AND THURSDAY 4/11/17   Yaneli Baker MD   cranberry extract 425 mg cap 425 mg. 1/17/14   Provider, Historical   LORazepam (ATIVAN) 0.5 mg tablet Take 0.5 mg by mouth daily. Provider, Historical   fluticasone (FLONASE) 50 mcg/actuation nasal spray 1 Spray. Provider, Historical   EPINEPHrine (EPIPEN) 0.3 mg/0.3 mL injection 0.3 mg. 10/1/16   Provider, Historical       REVIEW OF SYSTEMS:     I am not able to complete the review of systems because:    The patient is intubated and sedated    The patient has altered mental status due to his acute medical problems    The patient has baseline aphasia from prior stroke(s)    The patient has baseline dementia and is not reliable historian    The patient is in acute medical distress and unable to provide information           Total of 12 systems reviewed as follows:       POSITIVE= underlined text  Negative = text not underlined  General:  fever, chills, sweats, generalized weakness, weight loss/gain,      loss of appetite   Eyes:    blurred vision, eye pain, loss of vision, double vision  ENT:    rhinorrhea, pharyngitis   Respiratory:   cough, sputum production, SOB, VILLA, wheezing, pleuritic pain   Cardiology:   chest pain, palpitations, orthopnea, PND, edema, syncope   Gastrointestinal:  abdominal pain , N/V, diarrhea, dysphagia, constipation, bleeding   Genitourinary:  frequency, urgency, dysuria, hematuria, incontinence   Muskuloskeletal :  arthralgia, myalgia, back pain  Hematology:  easy bruising, nose or gum bleeding, lymphadenopathy   Dermatological: rash, ulceration, pruritis, color change / jaundice  Endocrine:   hot flashes or polydipsia   Neurological:  headache, dizziness, confusion, focal weakness, paresthesia,     Speech difficulties, memory loss, gait difficulty  Psychological: Feelings of anxiety, depression, agitation    Objective:   VITALS:    Visit Vitals  /65   Pulse 75   Temp 98.4 °F (36.9 °C)   Resp 20   Ht 5' 1\" (1.549 m)   Wt 70.3 kg (155 lb)   SpO2 99%   BMI 29.29 kg/m²       PHYSICAL EXAM:    General:    Alert, cooperative, no distress, appears stated age. HEENT: Atraumatic, anicteric sclerae, pink conjunctivae     No oral ulcers, mucosa moist, throat clear, dentition fair  Neck:  Supple, symmetrical,  thyroid: non tender  Lungs:   Clear to auscultation bilaterally. No Wheezing or Rhonchi. No rales. Chest wall:  No tenderness  No Accessory muscle use. Heart:   Regular  rhythm,  No  murmur   No edema  Abdomen:   Soft, TTP. Not distended. Bowel sounds normal  Extremities: No cyanosis. No clubbing,      Skin turgor normal, Capillary refill normal, Radial dial pulse 2+  Skin:     Not pale.   Not Jaundiced No rashes   Psych:  Good insight. Not depressed. Not anxious or agitated. Neurologic: EOMs intact. No facial asymmetry. No aphasia or slurred speech. Symmetrical strength, Sensation grossly intact. Alert and oriented X 4.       ______________________________________________________________________  Given the patient's current clinical presentation, I have a high level of concern for decompensation if discharged from the emergency department. Complex decision making was performed, which includes reviewing the patient's available past medical records, laboratory results, and x-ray films. My assessment of this patient's clinical condition and my plan of care is as follows. Assessment / Plan:  UTI (urinary tract infection)  -Came to ED with c/o dysuria  -CT abdomen showed bladder thickening, suggestive of Cystitis  -Given 750mg Levaquin in ED, pharmacy consult to continue Abx.  -Urine culture pending  -UA: -Nit, +LE, +epithelial, slight bacteria    Weakness  -PT/OT consult  -Initiate and maintain safety precautions    Nausea  -PRN Zofran  -Encourage PO intake  -CT Abd revealed bladder wall thickening    Essential hypertension  -Chronic  -Continue Coreg, Norvasc, Cozaar  -Monitor VS per unit protocol    Hyperlipidemia  -Chronic issue  -Continue Statin    Atrial fibrillation (HCC)  -Chronic issue  -Continue Amiodarone,Coreg.      ESRD (end stage renal disease) on dialysis ([facility] [address]  -Chronic issue  -HD per Nephrology  -Monday/Friday    Hypothyroidism  -Chronic issue  -Cont Synthroid    GERD (gastroesophageal reflux disease)  -Chronic issue  -Continue dexilant  -PRN Mylanta      Code Status: Full Code  Surrogate Decision Maker: See chart for details    DVT Prophylaxis: Heparin  GI Prophylaxis: Dexilant         _______________________________________________________________________  Care Plan discussed with:    Comments   Patient x    Family      RN x    Care Manager                    Consultant: _______________________________________________________________________  Expected  Disposition:   Home with Family x   HH/PT/OT/RN    SNF/LTC    [facility]    ________________________________________________________________________  TOTAL TIME:  39 Minutes        Comments    x Reviewed previous records   >50% of visit spent in counseling and coordination of care x Discussion with patient and/or family and questions answered       ________________________________________________________________________  Signed: Elvira Alvarado NP    Procedures: see electronic medical records for all procedures/Xrays and details which were not copied into this note but were reviewed prior to creation of Plan.     LAB DATA REVIEWED:    Recent Results (from the past 24 hour(s))   URINALYSIS W/ RFLX MICROSCOPIC    Collection Time: 04/07/22  4:30 PM   Result Value Ref Range    Color Yellow      Appearance Cloudy      Specific gravity 1.011 1.005 - 1.030      pH (UA) >8.5 (H) 5.0 - 8.0    Protein 300 (A) Negative mg/dL    Glucose Negative Negative mg/dL    Ketone Trace (A) Negative mg/dL    Bilirubin Negative Negative      Blood Trace (A) Negative      Urobilinogen 0.2 0.2 - 1.0 EU/dL    Nitrites Negative Negative      Leukocyte Esterase Large (A) Negative     URINE MICROSCOPIC    Collection Time: 04/07/22  4:30 PM   Result Value Ref Range    WBC >100 (H) 0 - 4 /hpf    RBC 5-10 0 - 2 /hpf    Epithelial cells Many 0 - 20 /lpf    Bacteria SLIGHT None /hpf   CULTURE, URINE    Collection Time: 04/07/22  4:30 PM    Specimen: Urine   Result Value Ref Range    Special Requests: No Special Requests      Culture result: PENDING    CBC WITH AUTOMATED DIFF    Collection Time: 04/07/22  8:55 PM   Result Value Ref Range    WBC 3.9 (L) 4.6 - 13.2 K/uL    RBC 2.95 (L) 4.20 - 5.30 M/uL    HGB 9.3 (L) 12.0 - 16.0 g/dL    HCT 31.0 (L) 35.0 - 45.0 %    .1 (H) 78.0 - 100.0 FL    MCH 31.5 24.0 - 34.0 PG    MCHC 30.0 (L) 31.0 - 37.0 g/dL    RDW 21.8 (H) 11.6 - 14.5 %    PLATELET 999 439 - 142 K/uL    MPV 9.7 9.2 - 11.8 FL    NRBC 0.0 0.0  WBC    ABSOLUTE NRBC 0.00 0.00 - 0.01 K/uL    NEUTROPHILS 40 40 - 73 %    LYMPHOCYTES 38 21 - 52 %    MONOCYTES 20 (H) 3 - 10 %    EOSINOPHILS 0 0 - 5 %    BASOPHILS 1 0 - 2 %    IMMATURE GRANULOCYTES 1 (H) 0 - 0.5 %    ABS. NEUTROPHILS 1.6 (L) 1.8 - 8.0 K/UL    ABS. LYMPHOCYTES 1.5 0.9 - 3.6 K/UL    ABS. MONOCYTES 0.8 0.05 - 1.2 K/UL    ABS. EOSINOPHILS 0.0 0.0 - 0.4 K/UL    ABS. BASOPHILS 0.0 0.0 - 0.1 K/UL    ABS. IMM. GRANS. 0.0 0.00 - 0.04 K/UL    DF AUTOMATED     METABOLIC PANEL, BASIC    Collection Time: 04/07/22  8:55 PM   Result Value Ref Range    Sodium 137 135 - 145 mmol/L    Potassium 4.2 3.2 - 5.1 mmol/L    Chloride 96 94 - 111 mmol/L    CO2 25 21 - 33 mmol/L    Anion gap 16 mmol/L    Glucose 73 70 - 110 mg/dL    BUN 39 (H) 9 - 21 mg/dL    Creatinine 9.20 (H) 0.70 - 1.20 mg/dL    BUN/Creatinine ratio 4      GFR est AA 5 ml/min/1.73m2    GFR est non-AA 4 ml/min/1.73m2    Calcium 7.9 (L) 8.5 - 10.5 mg/dL   HEPATIC FUNCTION PANEL    Collection Time: 04/07/22  8:55 PM   Result Value Ref Range    Protein, total 6.2 6.1 - 8.4 g/dL    Albumin 3.4 (L) 3.5 - 4.7 g/dL    Globulin 2.8 g/dL    A-G Ratio 1.2      Bilirubin, total 0.8 0.2 - 1.0 mg/dL    Bilirubin, direct 0.1 0.0 - 0.3 mg/dL    Alk.  phosphatase 103 38 - 126 U/L    AST (SGOT) 21 14 - 74 U/L    ALT (SGPT) 17 3 - 52 U/L   LACTIC ACID    Collection Time: 04/07/22  8:55 PM   Result Value Ref Range    Lactic acid 0.8 0.5 - 2.0 mmol/L   MAGNESIUM    Collection Time: 04/07/22  8:55 PM   Result Value Ref Range    Magnesium 2.1 1.7 - 2.8 mg/dL   LIPASE    Collection Time: 04/07/22  8:55 PM   Result Value Ref Range    Lipase 49 10 - 57 U/L   PROCALCITONIN    Collection Time: 04/07/22  8:55 PM   Result Value Ref Range    Procalcitonin 0.73 0.5 - 2.0 ng/mL     CT Results (most recent):  Results from Hospital Encounter encounter on 04/07/22    CT ABD PELV WO CONT    Narrative  EXAM: CT ABDOMEN AND PELVIS (NON-CONTRAST)    INDICATION: Dysuria, weakness    COMPARISON: CT abdomen pelvis 2/10/2022    TECHNIQUE: Axial CT imaging of the abdomen and pelvis was performed without IV  or oral contrast as per specific clinician request.  Multiplanar reformats were  generated. One or more dose reduction techniques were used on this CT:  automated exposure control, adjustment of the mAs and/or kVp according to  patient's size, and iterative reconstruction techniques. The specific techniques  utilized on this CT exam have been documented in the patient's electronic  medical record. Digital Imaging and Communications in Medicine (DICOM) format  image data are available to nonaffiliated external healthcare facilities or  entities on a secure, media free, reciprocally searchable basis with patient  authorization for at least a 12-month period after this study. _______________    FINDINGS:    The lack of oral and IV contrast limits evaluation of the solid organs and  bowel. LOWER CHEST: The visualized lung bases are clear. Very small right-sided  pleural effusion is present, improved. Hiatal hernia is present. LIVER, BILIARY: Liver is unremarkable. No abnormal biliary dilation. Gallbladder  is surgically absent. PANCREAS: Previously seen peripancreatic inflammatory stranding has resolved. No  peripancreatic fluid collection is present. SPLEEN: Unremarkable. ADRENALS: Unremarkable. KIDNEYS: Atrophic bilateral native kidneys are again seen. Left renal upper pole  simple cyst is again seen, unchanged. No follow-up imaging is recommended as  incidental lesion is likely benign. Right lower quadrant transplant kidney is again seen. Mild amount of perinephric  fluid and stranding is again seen. No hydronephrosis is present. Stable  hypodense lesion is present with small calcified rim, unchanged.     LYMPH NODES: No enlarged lymph nodes by size criteria. GASTROINTESTINAL TRACT: The lack of oral contrast limits bowel evaluation. No  abnormal bowel dilation or wall thickening. Appendix is within normal limits. Several scattered colonic diverticula are present without CT evidence of acute  diverticulitis. PELVIC ORGANS: Mild diffuse urinary bladder wall thickening is present. Uterus  and adnexa are unremarkable. VASCULATURE: Atherosclerotic calcifications are present. OSSEOUS: Degenerative changes are seen in the spine. Focal sclerotic changes  again seen along the anterosuperior left femoral head from prior avascular  necrosis. OTHER: None.    _______________    Impression  1. Urinary bladder wall thickening is present, nonspecific perhaps cystitis in  the appropriate setting. 2. Stable right lower quadrant transplant kidney with mild adjacent nonspecific  stranding. No hydronephrosis. 3. Interval resolution of previously seen peripancreatic inflammatory changes  and ascites. 4. Small residual right pleural effusion improved. Resolution of left pleural  effusion.

## 2022-04-08 NOTE — PROGRESS NOTES
In to attempt ROSA gardner, pt in bed states she is moving around without assistance. She states she just feels weak because she has not been able to eat anything since Sunday due to her nausea. She states she does not need therapy at this time and agrees to discontinue order.   Antoni Pichardo, PT, DPT

## 2022-04-08 NOTE — PROGRESS NOTES
2667: Notified [facility] of patient needing HD today, waiting on HD nurse to callback and confirm. 0: Spoke with [facility] oncall services, stated \"we don't have a nurse on today. \" Informed [facility] that our patient needs HD today. On call staff stated that this will \"try\" and find someone.

## 2022-04-11 ENCOUNTER — PATIENT OUTREACH (OUTPATIENT)
Dept: CASE MANAGEMENT | Age: 67
End: 2022-04-11

## 2022-04-11 PROBLEM — R53.1 WEAKNESS: Status: ACTIVE | Noted: 2022-04-07

## 2022-04-11 PROBLEM — R11.0 NAUSEA: Status: ACTIVE | Noted: 2022-04-07

## 2022-04-11 NOTE — PROGRESS NOTES
Care Transitions Initial Call    Call within 2 business days of discharge: Yes     Patient: Camron Issa Patient : 1955 MRN: [id number]    Last Discharge [address]       Complaint Diagnosis Description Type Department Provider    22 Urgency; Urinary Pain Urinary tract infection without hematuria, site unspecified . .. ED to Hosp-Admission (Discharged) (ADMIT) MAY Child MD; Trinh Self. .. Was this an external facility discharge? No Discharge Facility: [facility] 2022 to 2022. Challenges to be reviewed by the provider   Additional needs identified to be addressed with provider: no  none         Method of communication with provider : none    Discussed COVID-19 related testing which was not done at this time. Test results were not done. Patient informed of results, if available? n/a     Advance Care Planning:   Does patient have an Advance Directive:  health care decision makers updated    Inpatient Readmission Risk score: Unplanned Readmit Risk Score: 25.7 ( )    Was this a readmission? yes   Patient stated reason for the admission: UTI    Patients top risk factors for readmission: medical condition-UTI and support system   Interventions to address risk factors: Scheduled appointment with PCP-DIANNE    Care Transition Nurse (CTN) contacted the patient by telephone to perform post hospital discharge assessment. Verified name and  with patient as identifiers. Provided introduction to self, and explanation of the CTN role. CTN reviewed discharge instructions, medical action plan and red flags with patient who verbalized understanding. Were discharge instructions available to patient? yes. Reviewed appropriate site of care based on symptoms and resources available to patient including: PCP, Specialist and [address] [place] [address] Road. Patient given an opportunity to ask questions and does not have any further questions or concerns at this time.  The patient agrees to contact the PCP office for questions related to their healthcare. Medication reconciliation was performed with patient, who verbalizes understanding of administration of home medications. Advised obtaining a 90-day supply of all daily and as-needed medications. Referral to Pharm D needed: no     Home Health/Outpatient orders at discharge: [address]: n/a  Date of initial visit: [facility] ordered at discharge: None  [address]: [address] [facility] received: n/a    Covid Risk Education    Educated patient about risk for severe COVID-19 due to risk factors according to CDC guidelines. CTN reviewed discharge instructions, medical action plan and red flag symptoms with the patient who verbalized understanding. Discussed COVID vaccination status: yes. Education provided on COVID-19 vaccination as appropriate. Discussed exposure protocols and quarantine with CDC Guidelines. Patient was given an opportunity to verbalize any questions and concerns and agrees to contact CTN or health care provider for questions related to their healthcare. Was patient discharged with a pulse oximeter? no. Discussed and confirmed pulse oximeter discharge instructions and when to notify provider or seek emergency care. Discussed follow-up appointments. If no appointment was previously scheduled, appointment scheduling offered: yes. Is follow up appointment scheduled within 7 days of discharge? no.   [facility] follow up appointment(s):   Future Appointments   Date Time Provider Elena Coello   4/13/2022  2:00 PM [address]   5/4/2022 11:30 AM Jimmie Vance MD [facility] BS AMB   5/13/2022  4:45 PM Brit Archer MD [facility] BS AMB     Non-[facility] follow up appointment(s): n/a    Plan for follow-up call in 5-7 days based on severity of symptoms and risk factors.   Plan for next call: symptom management-ensure that pateint symptoms have decreased  CTN provided contact information for future needs. Goals Addressed                 This Visit's Progress     Prevent complications post hospitalization. 1. CTN will monitor X 4 weeks    2. Ensure provider appt is scheduled within 7 days post-discharge 3/15/2022 Patient sees PCP 3/21/222 at 3:30 pm; Patient scheduled for 5/13/2022 at 4:45 PM    3. Confirm patient attended post-discharge provider apt    4. Complete post-visit call to confirm attendance and update care needs 3/15/2022 Patient stated that she is doing ok. It is burning today when she uses restroom. She is on antibiotic. Encouraged her to drink water. 4/11/2022 patient had another admit. She is doing ok. Getting ready to go to dialysis today. No care needs noted today. 5. Review/educate common or potential \"red flags\" of condition worsening 3/15/2022 Reviewed signs /symptoms of diverticulitis such as pain in abdomen, bloating, blood in stool. Constipation, diarrhea, nausea, vomiting, flatulence, chills, loss of appetite, and cramping to name a few. 4/11/2022 patient admitted for UTI- reviewed signs/symptoms such as burning, urgency, pain, sense of incomplete bladder emptying, fatigue, fever, cramping or vaginal irritation to name a few. 6. Evaluate adherence to medications and priority barriers to resolve 3/15/2022 Patient stated that she has all her meds and is taking as directed. 4/11/2022 Patient stated that she has all meds and takes as prescribed. 7. Instruct on adherence to medications as ordered and assess for therapeutic response and side-effects  3/15/2022 Patient stated that she is aware of why she takes meds and knows when to call physician with issues. 4/11/2022 Patient knows why she takes meds and knows when to call physician for any issues. 8. Discuss and evaluate ADL performance. Provide recommendations on energy conservation, particularly related to transition home from an inpatient admission.  3/15/2022 Patient moves about as she can. She rests as needed. 4/11/2022 Patient stated that she is moving about without difficulty.

## 2022-04-12 ENCOUNTER — OFFICE VISIT (OUTPATIENT)
Dept: FAMILY MEDICINE CLINIC | Age: 67
End: 2022-04-12
Payer: MEDICARE

## 2022-04-12 VITALS
HEIGHT: 61 IN | WEIGHT: 152.7 LBS | HEART RATE: 76 BPM | BODY MASS INDEX: 28.83 KG/M2 | TEMPERATURE: 98.2 F | DIASTOLIC BLOOD PRESSURE: 79 MMHG | OXYGEN SATURATION: 97 % | SYSTOLIC BLOOD PRESSURE: 149 MMHG

## 2022-04-12 DIAGNOSIS — N39.0 URINARY TRACT INFECTION WITHOUT HEMATURIA, SITE UNSPECIFIED: ICD-10-CM

## 2022-04-12 DIAGNOSIS — I65.02 STENOSIS OF LEFT VERTEBRAL ARTERY: ICD-10-CM

## 2022-04-12 DIAGNOSIS — A08.39 CMV COLITIS (HCC): ICD-10-CM

## 2022-04-12 DIAGNOSIS — Z09 HOSPITAL DISCHARGE FOLLOW-UP: Primary | ICD-10-CM

## 2022-04-12 DIAGNOSIS — B25.9 CMV COLITIS (HCC): ICD-10-CM

## 2022-04-12 DIAGNOSIS — E03.9 ACQUIRED HYPOTHYROIDISM: ICD-10-CM

## 2022-04-12 PROCEDURE — G8427 DOCREV CUR MEDS BY ELIG CLIN: HCPCS | Performed by: STUDENT IN AN ORGANIZED HEALTH CARE EDUCATION/TRAINING PROGRAM

## 2022-04-12 PROCEDURE — 99496 TRANSJ CARE MGMT HIGH F2F 7D: CPT | Performed by: STUDENT IN AN ORGANIZED HEALTH CARE EDUCATION/TRAINING PROGRAM

## 2022-04-12 RX ORDER — ESCITALOPRAM OXALATE 10 MG/1
10 TABLET ORAL DAILY
Qty: 90 TABLET | Refills: 0 | Status: ON HOLD | OUTPATIENT
Start: 2022-04-12 | End: 2022-08-29

## 2022-04-12 RX ORDER — CARVEDILOL 25 MG/1
25 TABLET ORAL 2 TIMES DAILY WITH MEALS
Qty: 180 TABLET | Refills: 1 | Status: SHIPPED | OUTPATIENT
Start: 2022-04-12 | End: 2022-07-27 | Stop reason: SDUPTHER

## 2022-04-12 RX ORDER — ONDANSETRON 4 MG/1
4 TABLET, ORALLY DISINTEGRATING ORAL
Qty: 90 TABLET | Refills: 1 | Status: SHIPPED | OUTPATIENT
Start: 2022-04-12 | End: 2022-09-10

## 2022-04-12 RX ORDER — PREDNISONE 5 MG/1
1 TABLET ORAL DAILY
COMMUNITY

## 2022-04-12 RX ORDER — LEVOTHYROXINE SODIUM 75 UG/1
75 TABLET ORAL
Qty: 90 TABLET | Refills: 0 | Status: SHIPPED | OUTPATIENT
Start: 2022-04-12 | End: 2022-07-27 | Stop reason: SDUPTHER

## 2022-04-12 RX ORDER — CLOPIDOGREL BISULFATE 75 MG/1
75 TABLET ORAL DAILY
Qty: 90 TABLET | Refills: 1 | Status: SHIPPED | OUTPATIENT
Start: 2022-04-12 | End: 2022-05-19 | Stop reason: SDUPTHER

## 2022-04-12 RX ORDER — MECLIZINE HYDROCHLORIDE 25 MG/1
25 TABLET ORAL
Qty: 90 TABLET | Refills: 0 | Status: SHIPPED | OUTPATIENT
Start: 2022-04-12 | End: 2022-07-27 | Stop reason: SDUPTHER

## 2022-04-12 NOTE — PROGRESS NOTES
Subjective:   Luisa Anaya is a 61 y.o. female who was seen for Hospital Follow Up    Patient is here for hospital follow-up after being hospitalized multiple times within the last 2 months for acute diverticulitis, acute pancreatitis and dizziness. She was also found to have vertebral artery stenosis and was started on plavix. She had some diarrhea in the hospital and was started on oral vancomycin due to concerns for c diff colitis, but no stool samples were able to be obtained so she was treated empirically. She also had positive urine cultures but no antibiotics were placed on discharge due to her being treated for C diff colitis. Patient also had a CMV PCR test during her last hospitalization and it was negative. Her valganciclovir was discontinued during discharge. Patient states that she had a reaction to the vancomycin and did not finish the course. She then came to my office for a hospital follow up and was having decreased PO intake, vomiting and UTI symptoms. She was then transferred to the hospital for admission. She was recently discharged from recent hospitalization on 4/8/22 due to UTI and nausea/vomiting. Her symptoms have resolved after being discharged on zyvox and keflex. She will have GI follow up for her chronic GI symptoms. Home Medications    Medication Sig Start Date End Date Taking? Authorizing Provider   meclizine (ANTIVERT) 25 mg tablet Take 1 Tablet by mouth three (3) times daily as needed for Dizziness. 4/12/22  Yes Ruth Mike MD   ondansetron (ZOFRAN ODT) 4 mg disintegrating tablet Take 1 Tablet by mouth every eight (8) hours as needed for Nausea or Nausea or Vomiting. 4/12/22  Yes Ruth Mike MD   magic mouthwash (FIRST-MOUTHWASH BLM) -222-40 mg/30mL mwsh oral suspension Take 5 mL by mouth two (2) times daily as needed for Stomatitis. 4/12/22  Yes Ruth Mike MD   linezolid (Zyvox) 600 mg tablet Take 1 Tablet by mouth two (2) times a day for 8 days.  4/8/22 4/16/22  Mabel Wadsworth MD   cephALEXin (KEFLEX) 250 mg capsule Take 1 Capsule by mouth every twelve (12) hours for 13 doses. 4/8/22 4/15/22  Mabel Wadsworth MD   montelukast (SINGULAIR) 10 mg tablet Take 1 Tablet by mouth daily. 1/21/22   Krista Rasmussen MD   fluticasone-umeclidinium-vilanterol (Trelegy Ellipta) 100-62.5-25 mcg inhaler Take 1 Puff by inhalation daily. 1/20/22   Krista Rasmussen MD   hyoscyamine SL (LEVSIN/SL) 0.125 mg SL tablet 1 Tablet by SubLINGual route every four (4) hours as needed (irritable bowel). 1/17/22   Krista Rasmussen MD   Dexilant 60 mg CpDB capsule (delayed release) TAKE 1 CAPSULE BY MOUTH EVERY DAY 1/13/22   Krista Rasmussen MD   albuterol (PROVENTIL VENTOLIN) 2.5 mg /3 mL (0.083 %) nebu USE 1 VIAL VIA NEBULIZER THREE TIMES DAILY 12/23/21   Krista Rasmussen MD   gabapentin (NEURONTIN) 100 mg capsule 2 capsules TID 12/22/21   Balbir Beltran MD   sucralfate (CARAFATE) 1 gram tablet TAKE 1 TABLET BY MOUTH FOUR TIMES DAILY 12/20/21   Krista Rasmussen MD   amLODIPine (NORVASC) 10 mg tablet Take 1 Tablet by mouth daily. 12/20/21   Krista Rasmussen MD   amiodarone (CORDARONE) 200 mg tablet TAKE 1 TABLET BY MOUTH EVERY DAY 9/18/21   Jesus Gonzalez MD   valGANciclovir (VALCYTE) 450 mg tablet TAKE 2 TABLETS BY MOUTH DAILY 9/18/21   Jesus Gonzalez MD   levothyroxine (SYNTHROID) 50 mcg tablet Take 1 Tablet by mouth daily. 8/24/21   Jesus Gonzalez MD   carvediloL (COREG) 6.25 mg tablet Take 1 Tablet by mouth two (2) times daily (with meals). 8/24/21   Jesus Gonzalez MD   losartan (COZAAR) 50 mg tablet Take 1 Tablet by mouth daily. 8/24/21   Jesus Gonzalez MD   calcitRIOL (ROCALTROL) 0.25 mcg capsule Take 1 Capsule by mouth as directed. Take on non dialysis days 7/19/21   Other, MD Abelardo   RenaPlex-D 800 mcg-12.5 mg -2,000 unit tab Take 1 Tablet by mouth daily. 4/15/21   Other, MD Abelardo   sevelamer carbonate (RENVELA) 800 mg tab tab Take 800 mg by mouth three (3) times daily.  6/8/21   OtherAbelardo MD   zolpidem (AMBIEN) 5 mg tablet Take 5 mg by mouth nightly as needed. 4/5/21   Other, MD Abelardo   simvastatin (ZOCOR) 20 mg tablet TAKE 1 TABLET BY MOUTH DAILY AS DIRECTED. 2/19/21   Denita Byers MD   aluminum & magnesium hydroxide-simethicone (Maalox Maximum Strength) 400-400-40 mg/5 mL suspension Take 10 mL by mouth every six (6) hours as needed for Indigestion. 9/17/20   Milton Tee MD   ESTRACE 0.01 % (0.1 mg/gram) vaginal cream INSERT 2 GRAMS INTO VAGINA EVERY MONDAY AND THURSDAY 4/11/17   Audery Barthel, MD   cranberry extract 425 mg cap 425 mg. 1/17/14   Provider, Historical   LORazepam (ATIVAN) 0.5 mg tablet Take 0.5 mg by mouth daily. Provider, Historical   fluticasone (FLONASE) 50 mcg/actuation nasal spray 1 Spray. Provider, Historical   EPINEPHrine (EPIPEN) 0.3 mg/0.3 mL injection 0.3 mg. 10/1/16   Provider, Historical      Allergies   Allergen Reactions    Aspirin Rash and Unknown (comments)    Bactrim [Sulfamethoxazole-Trimethoprim] Rash and Unknown (comments)    Bromfenac Rash and Unknown (comments)    Cefepime Other (comments)     Neurological reaction    Ceftriaxone Rash    Copper Rash    Ibuprofen Rash and Unknown (comments)    Ketorolac Tromethamine Rash and Unknown (comments)    Relafen [Nabumetone] Rash and Unknown (comments)    Rifampin Rash and Unknown (comments)    Vancomycin Rash and Unknown (comments)     Pt reports causes itching, takes benadryl prior to use. Pt denies anaphylaxis. Requires benadryl with each vancomycin dose     Social History     Tobacco Use    Smoking status: Never Smoker    Smokeless tobacco: Never Used   Vaping Use    Vaping Use: Never used   Substance Use Topics    Alcohol use: No    Drug use: No        Review of Systems   All other systems reviewed and are negative.       Objective:     Visit Vitals  BP (!) 149/79 (BP 1 Location: Right upper arm, BP Patient Position: Sitting, BP Cuff Size: Adult)   Pulse 76   Temp 98.2 °F (36.8 °C) (Temporal)   Ht 5' 1\" (1.549 m)   Wt 152 lb 11.2 oz (69.3 kg)   SpO2 97%   BMI 28.85 kg/m²        General: Alert, oriented  Chest/Lungs: clear breath sounds, no wheezing or crackles  Heart: normal rate, regular rhythm, no murmur  Abdomen: soft, non-distended, non-tender, normal bowel sounds, no organomegaly, no masses  Extremities: No edema  Skin: no active skin lesions      Assessment & Plan:     1. UTI  Continue zyvox and keflex. Symptoms have resolved    2. Dysphagia  Last EGD in 7/2020 showed a hiatal hernia, nonobstructing Schatzki's ring, and gastritis. Currently has decreased PO intake and vomiting. Will likely need repeat EGD to rule out CMV esophagitis vs candidal esophagitis from chronic prednisone use vs pill esophagitis from sevelmer use. She had her valganciclovir discontinued at her recent hospitalization and per previous nephrology office visit note from Dr. Sarai Maldonado in May 2019 which is the most recent office note I could find; he recommended keeping her on valganciclovir. Will keep valganciclovir for now. Will have GI outpatient follow up. 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 Was previously on valganciclovir for prophylaxis but was discontinued during recent hospitalization. She had a negative CMV viral load. Reviewed previous nephrology office note from Dr. Sarai Maldonado in May 2019 which is the most recent office note I could find and he recommended keeping her on valganciclovir. Consider rechecking CMV viral load. May need EGD to rule out CMV esophagitis due to her vomiting symptoms. Has not had any recent transplant physician follow up. Will continue valganciclovir. Discharge date: 4/8/22  Telephone transition of care encounter: 4/11/22 (See separate note)      I have discussed the diagnosis with the patient and the intended plan as seen in the above orders. The patient has received an after-visit summary and questions were answered concerning future plans.   I have discussed medication side effects and warnings with the patient as well. I have reviewed the plan of care with the patient, accepted their input and they are in agreement with the treatment goals. Previous lab and imaging results were reviewed by me.        Naveen Ross MD  April 12, 2022

## 2022-04-12 NOTE — PROGRESS NOTES
Sukumar Cole presents today for   Chief Complaint   Patient presents with   [facility] Follow Up       Is someone accompanying this pt? No     Is the patient using any DME equipment during OV? No     Depression Screening:  3 most recent PHQ Screens 4/12/2022   Little interest or pleasure in doing things Not at all   Feeling down, depressed, irritable, or hopeless Not at all   Total Score PHQ 2 0       Learning Assessment:  Learning Assessment 8/17/2021   PRIMARY LEARNER Patient   HIGHEST LEVEL OF EDUCATION - PRIMARY LEARNER  SOME COLLEGE   BARRIERS PRIMARY LEARNER NONE   PRIMARY LANGUAGE ENGLISH   LEARNER PREFERENCE PRIMARY DEMONSTRATION   LEARNING SPECIAL TOPICS NO   ANSWERED BY SELF   RELATIONSHIP SELF       Fall Risk  Fall Risk Assessment, last 12 mths 12/24/2020   Able to walk? Yes   Fall in past 12 months? 0   Do you feel unsteady? 0   Are you worried about falling 0   Number of falls in past 12 months -   Fall with injury? -       Health Maintenance reviewed and discussed and ordered per Provider. Health Maintenance Due   Topic Date Due    Hepatitis C Screening  Never done    DTaP/Tdap/Td series (1 - Tdap) Never done    Cervical cancer screen  Never done    Shingrix Vaccine Age 50> (1 of 2) Never done    Breast Cancer Screen Mammogram  10/01/2021    Bone Densitometry (Dexa) Screening  07/03/2022   . Coordination of Care:    1. \"Have you been to the ER, urgent care clinic since your last visit? Hospitalized since your last visit? \" Yes Where: [address] [place] [address]  Reason for visit: weakness     2. \"Have you seen or consulted any other health care providers outside of the [address] [place] [address] since your last visit? \" No     3. For patients aged 39-70: Has the patient had a colonoscopy? Yes - no Care Gap present     If the patient is female:    4. For patients aged 41-77: Has the patient had a mammogram within the past 2 years? Yes - Care Gap present. Most recent result on file    5.  For patients aged 21-65: Has the patient had a pap smear?  No

## 2022-04-13 ENCOUNTER — HOSPITAL ENCOUNTER (OUTPATIENT)
Dept: MAMMOGRAPHY | Age: 67
Discharge: HOME | End: 2022-04-13
Attending: STUDENT IN AN ORGANIZED HEALTH CARE EDUCATION/TRAINING PROGRAM
Payer: MEDICARE

## 2022-04-13 DIAGNOSIS — Z12.31 ENCOUNTER FOR SCREENING MAMMOGRAM FOR MALIGNANT NEOPLASM OF BREAST: ICD-10-CM

## 2022-04-13 PROCEDURE — 77063 BREAST TOMOSYNTHESIS BI: CPT

## 2022-04-14 ENCOUNTER — TELEPHONE (OUTPATIENT)
Dept: FAMILY MEDICINE CLINIC | Age: 67
End: 2022-04-14

## 2022-04-14 LAB
BACTERIA SPEC CULT: NORMAL
BACTERIA SPEC CULT: NORMAL
SPECIAL REQUESTS,SREQ: NORMAL
SPECIAL REQUESTS,SREQ: NORMAL

## 2022-04-14 RX ORDER — NYSTATIN 100000 [USP'U]/ML
1 SUSPENSION ORAL 4 TIMES DAILY
Qty: 473 ML | Refills: 0 | Status: ON HOLD | OUTPATIENT
Start: 2022-04-14 | End: 2022-08-29

## 2022-04-14 NOTE — TELEPHONE ENCOUNTER
Patient stated the magic mouth wash you prescribed is over $100. Patient is unable to afford that requesting a different Rx [facility] into [facility]

## 2022-04-18 ENCOUNTER — PATIENT OUTREACH (OUTPATIENT)
Dept: CASE MANAGEMENT | Age: 67
End: 2022-04-18

## 2022-04-18 NOTE — PROGRESS NOTES
Care Transitions Follow Up Call    Challenges to be reviewed by the provider   Additional needs identified to be addressed with provider: no  none           Method of communication with provider : none    Care Transition Nurse (CTN) contacted the patient by telephone to follow up after admission on 2022 to 2022. Archie Montanez Verified name and  with patient as identifiers. Addressed changes since last contact: none  Follow up appointment completed? yes. Was follow up appointment scheduled within 7 days of discharge? yes. Advance Care Planning:   Does patient have an Advance Directive:  yes; reviewed and current     CTN reviewed discharge instructions, medical action plan and red flags with patient and discussed any barriers to care and/or understanding of plan of care after discharge. Discussed appropriate site of care based on symptoms and resources available to patient including: PCP and [employer]. The patient agrees to contact the PCP office for questions related to their healthcare. Patients top risk factors for readmission: medical condition-UTI and support system   Interventions to address risk factors: Scheduled appointment with PCP-[facility] follow up appointment(s):   Future Appointments   Date Time Provider Elena Coello   2022 11:30 AM Shante Grant MD [facility] BS AMB   2022  2:15 PM Jessica Buckley MD [facility] BS AMB     Non-[facility] follow up appointment(s): n/a    CTN provided contact information for future needs. Plan for follow-up call in 5-7 days based on severity of symptoms and risk factors. Plan for next call: medication management-ensure that patient has all meds and is taking as directed. Goals Addressed                 This Visit's Progress     Prevent complications post hospitalization. 1. CTN will monitor X 4 weeks    2.  Ensure provider appt is scheduled within 7 days post-discharge 3/15/2022 Patient sees PCP 3/21/222 at 3:30 pm; Patient scheduled for 5/13/2022 at 4:45 PM    3. Confirm patient attended post-discharge provider apt 4/18/2022 Patient had appt with PCP. 4. Complete post-visit call to confirm attendance and update care needs 3/15/2022 Patient stated that she is doing ok. It is burning today when she uses restroom. She is on antibiotic. Encouraged her to drink water. 4/11/2022 patient had another admit. She is doing ok. Getting ready to go to dialysis today. No care needs noted today. 4/18/2022 Patient is improving. Her symptoms have gone away. Doing well at present. 5. Review/educate common or potential \"red flags\" of condition worsening 3/15/2022 Reviewed signs /symptoms of diverticulitis such as pain in abdomen, bloating, blood in stool. Constipation, diarrhea, nausea, vomiting, flatulence, chills, loss of appetite, and cramping to name a few. 4/11/2022 patient admitted for UTI- reviewed signs/symptoms such as burning, urgency, pain, sense of incomplete bladder emptying, fatigue, fever, cramping or vaginal irritation to name a few. 6. Evaluate adherence to medications and priority barriers to resolve 3/15/2022 Patient stated that she has all her meds and is taking as directed. 4/11/2022 Patient stated that she has all meds and takes as prescribed. 4/18/2022 Patient has all meds and is taking as instructed. 7. Instruct on adherence to medications as ordered and assess for therapeutic response and side-effects  3/15/2022 Patient stated that she is aware of why she takes meds and knows when to call physician with issues. 4/11/2022 Patient knows why she takes meds and knows when to call physician for any issues. 8. Discuss and evaluate ADL performance. Provide recommendations on energy conservation, particularly related to transition home from an inpatient admission. 3/15/2022 Patient moves about as she can. She rests as needed. 4/11/2022 Patient stated that she is moving about without difficulty.  4/18/2022 Patient is moving without any issues.

## 2022-04-21 ENCOUNTER — TELEPHONE (OUTPATIENT)
Dept: FAMILY MEDICINE CLINIC | Age: 67
End: 2022-04-21

## 2022-04-21 DIAGNOSIS — N39.0 URINARY TRACT INFECTION WITHOUT HEMATURIA, SITE UNSPECIFIED: Primary | ICD-10-CM

## 2022-04-21 NOTE — TELEPHONE ENCOUNTER
----- Message from Deborah Jensen sent at 4/21/2022 12:56 PM EDT -----  Subject: Refill Request    QUESTIONS  Name of Medication? Other - cephalexin   Patient-reported dosage and instructions? 250mg twice daily  How many days do you have left? 0  Preferred Pharmacy? [facility]  Pharmacy phone number (if available)? [phone]  Additional Information for Provider? pt still having UTI symptoms and   needs more of this medication. Pt has been off the medication for 5 days  ---------------------------------------------------------------------------  --------------  CALL BACK INFO  What is the best way for the office to contact you? OK to leave message on   voicemail  Preferred Call Back Phone Number? [phone]  ---------------------------------------------------------------------------  --------------  SCRIPT ANSWERS  Relationship to Patient?  Self

## 2022-04-25 ENCOUNTER — PATIENT OUTREACH (OUTPATIENT)
Dept: CASE MANAGEMENT | Age: 67
End: 2022-04-25

## 2022-04-25 NOTE — PROGRESS NOTES
Care Transitions Follow Up Call    Challenges to be reviewed by the provider   Additional needs identified to be addressed with provider: no  none           Method of communication with provider : none    Care Transition Nurse (CTN) contacted the patient by telephone to follow up after admission on 2022 to 2022. Verified name and  with patient as identifiers. Addressed changes since last contact: none  Follow up appointment completed? yes. Was follow up appointment scheduled within 7 days of discharge? yes. Advance Care Planning:   Does patient have an Advance Directive:  yes; reviewed and current     CTN reviewed discharge instructions, medical action plan and red flags with patient and discussed any barriers to care and/or understanding of plan of care after discharge. Discussed appropriate site of care based on symptoms and resources available to patient including: PCP and [employer]. The patient agrees to contact the PCP office for questions related to their healthcare. Patients top risk factors for readmission: medical condition-UTI and support system   Interventions to address risk factors: Scheduled appointment with PCP-[facility] follow up appointment(s):   Future Appointments   Date Time Provider Elena Coello   2022 11:30 AM Nava Moore MD [facility] BS AMB   2022  2:15 PM Graeme Alvares MD [facility] BS AMB     Non-[facility] follow up appointment(s): n/a    CTN provided contact information for future needs. Plan for follow-up call in 5-7 days based on severity of symptoms and risk factors. Plan for next call: follow up appointment-Encouraged patient to attend all follow up appts. Goals Addressed                 This Visit's Progress     Prevent complications post hospitalization. 1. CTN will monitor X 4 weeks    2.  Ensure provider appt is scheduled within 7 days post-discharge 3/15/2022 Patient sees PCP 3/21/222 at 3:30 pm; Patient scheduled for 2022 at 4:45 PM    3. Confirm patient attended post-discharge provider apt 4/18/2022 Patient had appt with PCP. 4. Complete post-visit call to confirm attendance and update care needs 3/15/2022 Patient stated that she is doing ok. It is burning today when she uses restroom. She is on antibiotic. Encouraged her to drink water. 4/11/2022 patient had another admit. She is doing ok. Getting ready to go to dialysis today. No care needs noted today. 4/18/2022 Patient is improving. Her symptoms have gone away. Doing well at present. 4/25/2022 Patient is still not feeling 100%. She missed dialysis Fri. She went to dialysis today. 5. Review/educate common or potential \"red flags\" of condition worsening 3/15/2022 Reviewed signs /symptoms of diverticulitis such as pain in abdomen, bloating, blood in stool. Constipation, diarrhea, nausea, vomiting, flatulence, chills, loss of appetite, and cramping to name a few. 4/11/2022 patient admitted for UTI- reviewed signs/symptoms such as burning, urgency, pain, sense of incomplete bladder emptying, fatigue, fever, cramping or vaginal irritation to name a few. 6. Evaluate adherence to medications and priority barriers to resolve 3/15/2022 Patient stated that she has all her meds and is taking as directed. 4/11/2022 Patient stated that she has all meds and takes as prescribed. 4/18/2022 Patient has all meds and is taking as instructed. 4/25/2022 Patient continues to have all meds. 7. Instruct on adherence to medications as ordered and assess for therapeutic response and side-effects  3/15/2022 Patient stated that she is aware of why she takes meds and knows when to call physician with issues. 4/11/2022 Patient knows why she takes meds and knows when to call physician for any issues. 4/25/2022 Patient has no medication concerns. 8. Discuss and evaluate ADL performance.   Provide recommendations on energy conservation, particularly related to transition home from an inpatient admission. 3/15/2022 Patient moves about as she can. She rests as needed. 4/11/2022 Patient stated that she is moving about without difficulty. 4/18/2022 Patient is moving without any issues. 4/25/2022 Patient is moving about as she can, going to dialysis and resting as she needs to.

## 2022-04-26 ENCOUNTER — TELEPHONE (OUTPATIENT)
Dept: FAMILY MEDICINE CLINIC | Age: 67
End: 2022-04-26

## 2022-04-26 NOTE — TELEPHONE ENCOUNTER
Dale Waterman with [facility] health stated she is with patient and she has new symptoms of a uti and she has finished her abx from last time. She stated that patient mentioned once she is on the abx it helps her then once she is off of it she gets another UTI. Wanting to know if Dr. Emerita Steen can send in an abx or does patient need to bring in urine?

## 2022-04-28 RX ORDER — MONTELUKAST SODIUM 10 MG/1
10 TABLET ORAL DAILY
Qty: 90 TABLET | Refills: 0 | Status: SHIPPED | OUTPATIENT
Start: 2022-04-28 | End: 2022-05-19

## 2022-05-02 ENCOUNTER — PATIENT OUTREACH (OUTPATIENT)
Dept: CASE MANAGEMENT | Age: 67
End: 2022-05-02

## 2022-05-11 ENCOUNTER — PATIENT OUTREACH (OUTPATIENT)
Dept: CASE MANAGEMENT | Age: 67
End: 2022-05-11

## 2022-05-11 NOTE — PROGRESS NOTES
Patient has graduated from the Transitions of Care Coordination  program on 5/11/2022. Patient's symptoms are stable at this time. Patient/family has the ability to self-manage. Care management goals have been completed at this time. No further care transitions nurse follow up scheduled. Goals Addressed                 This Visit's Progress     COMPLETED: Prevent complications post hospitalization. 1. CTN will monitor X 4 weeks    2. Ensure provider appt is scheduled within 7 days post-discharge 3/15/2022 Patient sees PCP 3/21/222 at 3:30 pm; Patient scheduled for 5/13/2022 at 4:45 PM    3. Confirm patient attended post-discharge provider apt 4/18/2022 Patient had appt with PCP. 4. Complete post-visit call to confirm attendance and update care needs 3/15/2022 Patient stated that she is doing ok. It is burning today when she uses restroom. She is on antibiotic. Encouraged her to drink water. 4/11/2022 patient had another admit. She is doing ok. Getting ready to go to dialysis today. No care needs noted today. 4/18/2022 Patient is improving. Her symptoms have gone away. Doing well at present. 4/25/2022 Patient is still not feeling 100%. She missed dialysis Fri. She went to dialysis today. 5/11/2022 Patient is doing well. No care needs noted today. 5. Review/educate common or potential \"red flags\" of condition worsening 3/15/2022 Reviewed signs /symptoms of diverticulitis such as pain in abdomen, bloating, blood in stool. Constipation, diarrhea, nausea, vomiting, flatulence, chills, loss of appetite, and cramping to name a few. 4/11/2022 patient admitted for UTI- reviewed signs/symptoms such as burning, urgency, pain, sense of incomplete bladder emptying, fatigue, fever, cramping or vaginal irritation to name a few. 6. Evaluate adherence to medications and priority barriers to resolve 3/15/2022 Patient stated that she has all her meds and is taking as directed.  4/11/2022 Patient stated that she has all meds and takes as prescribed. 4/18/2022 Patient has all meds and is taking as instructed. 4/25/2022 Patient continues to have all meds. 5/11/2022 Patient continues to have all meds and is taking as she should. 7. Instruct on adherence to medications as ordered and assess for therapeutic response and side-effects  3/15/2022 Patient stated that she is aware of why she takes meds and knows when to call physician with issues. 4/11/2022 Patient knows why she takes meds and knows when to call physician for any issues. 4/25/2022 Patient has no medication concerns. 5/11/2022 No concerns about meds at present. 8. Discuss and evaluate ADL performance. Provide recommendations on energy conservation, particularly related to transition home from an inpatient admission. 3/15/2022 Patient moves about as she can. She rests as needed. 4/11/2022 Patient stated that she is moving about without difficulty. 4/18/2022 Patient is moving without any issues. 4/25/2022 Patient is moving about as she can, going to dialysis and resting as she needs to. 5/11/2022 Patient moves about and rests as needed between activities. Patient has care transitions nurse contact information for any further questions, concerns, or needs.   Patient's upcoming visits:    Future Appointments   Date Time Provider Elena Coello   5/12/2022  2:15 PM Deepthi Emery MD [facility] BS AMB   5/26/2022  1:30 PM Mary Vance MD [facility] BS AMB

## 2022-05-13 ENCOUNTER — TELEPHONE (OUTPATIENT)
Dept: FAMILY MEDICINE CLINIC | Age: 67
End: 2022-05-13

## 2022-05-13 NOTE — TELEPHONE ENCOUNTER
----- Message from Joseph Sweet sent at 5/13/2022  3:22 PM EDT -----  Subject: Message to Provider    QUESTIONS  Information for Provider? Patient currently in the hospital and wanted the   office to know. Patient will call to schedule appointment once she has   been released . ---------------------------------------------------------------------------  --------------  Nata LOPEZ  What is the best way for the office to contact you? OK to leave message on   voicemail  Preferred Call Back Phone Number? [phone]  ---------------------------------------------------------------------------  --------------  SCRIPT ANSWERS  Relationship to Patient?  Self

## 2022-05-16 ENCOUNTER — PATIENT OUTREACH (OUTPATIENT)
Dept: CASE MANAGEMENT | Age: 67
End: 2022-05-16

## 2022-05-16 NOTE — PROGRESS NOTES
Care Transitions Initial Call    Call within 2 business days of discharge: Yes     Patient: Leonarda Washington Patient : 1955 MRN: [id number]    Last Discharge [address]       Complaint Diagnosis Description Type Department Provider    22 Urgency; Urinary Pain Urinary tract infection without hematuria, site unspecified . .. ED to Hosp-Admission (Discharged) (ADMIT) [facility] Lana Burciaga MD; Sandrine Santoro. .. Was this an external facility discharge? Yes, 2022 - 2022 Discharge Facility: [facility].    CTN Attempt to contact patient via telephone on 22  for post hospital follow up. Unable to reach. Unable to leave a voice message. Recording \" can't accept more  voice  messages. vm is unavailable.  \"    [address] follow up appointment(s):   Future Appointments   Date Time Provider Elena Coello   2022  1:30 PM Raghu Vance MD [facility] BS AMB

## 2022-05-17 ENCOUNTER — PATIENT OUTREACH (OUTPATIENT)
Dept: CASE MANAGEMENT | Age: 67
End: 2022-05-17

## 2022-05-17 NOTE — PROGRESS NOTES
Care Transitions Initial Call    Call within 2 business days of discharge: Yes     Patient: Leonarda Washington Patient : 1955 MRN: [id number]    Last Discharge [address]       Complaint Diagnosis Description Type Department Provider    22 Urgency; Urinary Pain Urinary tract infection without hematuria, site unspecified . .. ED to Hosp-Admission (Discharged) (ADMIT) [facility] Lana Burciaga MD; Sandrien Santoro. .. Was this an external facility discharge? Yes, 2022 - 2022 Discharge Facility: [facility] reached Patient on Phone. Pt. Verified her identity, CTN introduced self role and purpose of call. Patient reported that she is doing okay. No new or worsening of symptoms reported by Pt. At this time. Pt. Reports that she doesn't really have time to talk today and requesting for CTN to call back some other time. Pt. Kept the conversation short and concluded the call.      [facility] follow up appointment(s):   Future Appointments   Date Time Provider Elena Coello   2022  1:30 PM Raghu Vance MD [facility] BS AMB

## 2022-05-19 ENCOUNTER — OFFICE VISIT (OUTPATIENT)
Dept: FAMILY MEDICINE CLINIC | Age: 67
End: 2022-05-19
Payer: MEDICARE

## 2022-05-19 VITALS
BODY MASS INDEX: 28.09 KG/M2 | WEIGHT: 148.8 LBS | HEIGHT: 61 IN | OXYGEN SATURATION: 99 % | HEART RATE: 66 BPM | DIASTOLIC BLOOD PRESSURE: 78 MMHG | SYSTOLIC BLOOD PRESSURE: 156 MMHG

## 2022-05-19 DIAGNOSIS — R10.9 CHRONIC ABDOMINAL PAIN: ICD-10-CM

## 2022-05-19 DIAGNOSIS — F51.01 PRIMARY INSOMNIA: ICD-10-CM

## 2022-05-19 DIAGNOSIS — G89.29 CHRONIC ABDOMINAL PAIN: ICD-10-CM

## 2022-05-19 DIAGNOSIS — N39.0 RECURRENT UTI: Primary | ICD-10-CM

## 2022-05-19 LAB
BILIRUB UR QL STRIP: NEGATIVE
GLUCOSE UR-MCNC: NEGATIVE MG/DL
KETONES P FAST UR STRIP-MCNC: NEGATIVE MG/DL
PH UR STRIP: 8.5 [PH] (ref 4.6–8)
PROT UR QL STRIP: ABNORMAL
SP GR UR STRIP: 1.01 (ref 1–1.03)
UA UROBILINOGEN AMB POC: ABNORMAL (ref 0.2–1)
URINALYSIS CLARITY POC: CLEAR
URINALYSIS COLOR POC: YELLOW
URINE BLOOD POC: NEGATIVE
URINE LEUKOCYTES POC: ABNORMAL
URINE NITRITES POC: NEGATIVE

## 2022-05-19 PROCEDURE — 99215 OFFICE O/P EST HI 40 MIN: CPT | Performed by: STUDENT IN AN ORGANIZED HEALTH CARE EDUCATION/TRAINING PROGRAM

## 2022-05-19 PROCEDURE — 81003 URINALYSIS AUTO W/O SCOPE: CPT | Performed by: STUDENT IN AN ORGANIZED HEALTH CARE EDUCATION/TRAINING PROGRAM

## 2022-05-19 PROCEDURE — G9899 SCRN MAM PERF RSLTS DOC: HCPCS | Performed by: STUDENT IN AN ORGANIZED HEALTH CARE EDUCATION/TRAINING PROGRAM

## 2022-05-19 PROCEDURE — 3017F COLORECTAL CA SCREEN DOC REV: CPT | Performed by: STUDENT IN AN ORGANIZED HEALTH CARE EDUCATION/TRAINING PROGRAM

## 2022-05-19 PROCEDURE — G8510 SCR DEP NEG, NO PLAN REQD: HCPCS | Performed by: STUDENT IN AN ORGANIZED HEALTH CARE EDUCATION/TRAINING PROGRAM

## 2022-05-19 PROCEDURE — G8428 CUR MEDS NOT DOCUMENT: HCPCS | Performed by: STUDENT IN AN ORGANIZED HEALTH CARE EDUCATION/TRAINING PROGRAM

## 2022-05-19 PROCEDURE — G8417 CALC BMI ABV UP PARAM F/U: HCPCS | Performed by: STUDENT IN AN ORGANIZED HEALTH CARE EDUCATION/TRAINING PROGRAM

## 2022-05-19 PROCEDURE — G9231 DOC ESRD DIA TRANS PREG: HCPCS | Performed by: STUDENT IN AN ORGANIZED HEALTH CARE EDUCATION/TRAINING PROGRAM

## 2022-05-19 RX ORDER — SUCRALFATE 1 G/1
TABLET ORAL
Qty: 360 TABLET | Refills: 0 | Status: SHIPPED | OUTPATIENT
Start: 2022-05-19 | End: 2022-07-27 | Stop reason: SDUPTHER

## 2022-05-19 RX ORDER — CEPHALEXIN 500 MG/1
500 CAPSULE ORAL 3 TIMES DAILY
Qty: 21 CAPSULE | Refills: 0 | Status: SHIPPED | OUTPATIENT
Start: 2022-05-19 | End: 2022-05-26

## 2022-05-19 NOTE — PROGRESS NOTES
Sukumar Cole presents today for   Chief Complaint   Patient presents with   [facility] Follow Up       Is someone accompanying this pt? No     Is the patient using any DME equipment during OV? No     Depression Screening:  3 most recent PHQ Screens 5/19/2022   Little interest or pleasure in doing things Not at all   Feeling down, depressed, irritable, or hopeless Not at all   Total Score PHQ 2 0       Learning Assessment:  Learning Assessment 8/17/2021   PRIMARY LEARNER Patient   HIGHEST LEVEL OF EDUCATION - PRIMARY LEARNER  SOME COLLEGE   BARRIERS PRIMARY LEARNER NONE   PRIMARY LANGUAGE ENGLISH   LEARNER PREFERENCE PRIMARY DEMONSTRATION   LEARNING SPECIAL TOPICS NO   ANSWERED BY SELF   RELATIONSHIP SELF       Fall Risk  Fall Risk Assessment, last 12 mths 12/24/2020   Able to walk? Yes   Fall in past 12 months? 0   Do you feel unsteady? 0   Are you worried about falling 0   Number of falls in past 12 months -   Fall with injury? -       Health Maintenance reviewed and discussed and ordered per Provider. Health Maintenance Due   Topic Date Due    Hepatitis C Screening  Never done    DTaP/Tdap/Td series (1 - Tdap) Never done    Cervical cancer screen  Never done    Shingrix Vaccine Age 50> (1 of 2) Never done    Bone Densitometry (Dexa) Screening  07/03/2022   . Coordination of Care:    1. \"Have you been to the ER, urgent care clinic since your last visit? Hospitalized since your last visit? \" Yes Where: [facility] Reason for visit: stomach pain vomiting    2. \"Have you seen or consulted any other health care providers outside of the [address] [place] [address] since your last visit? \" No     3. For patients aged 39-70: Has the patient had a colonoscopy? Yes - no Care Gap present     If the patient is female:    4. For patients aged 41-77: Has the patient had a mammogram within the past 2 years? Yes - no Care Gap present    5. For patients aged 21-65: Has the patient had a pap smear?  No

## 2022-05-23 ENCOUNTER — PATIENT OUTREACH (OUTPATIENT)
Dept: CASE MANAGEMENT | Age: 67
End: 2022-05-23

## 2022-05-23 NOTE — PROGRESS NOTES
Care Transitions Follow Up Call    CTN Attempt to contact patient via telephone on 5/23/22  for post hospital follow up. Unable to reach. Unable to leave a voice message. Recording \" can't accept more  voice  messages. vm is unavailable.  \"    [address] follow up appointment(s):   Future Appointments   Date Time Provider Elena Coello   5/26/2022  1:30 PM Raiza Wills MD [facility] BS AMB   8/18/2022  1:45 PM Sarah Boucher MD [facility] BS AMB

## 2022-05-25 NOTE — PROGRESS NOTES
Referring Physician:  Nabila Jackman MD     Chief Complaint: Colon cancer screening, Abdominal pain. Date of service: 05/26/2022    Subjective:     History of Present Illness:  Patient is a female BLACK/ 61 y.o. with a complicated medical history who is seen for evaluation for colon cancer screening and abdominal pain. The patient has GI complaints of abdominal pain 2/2022 and was admitted to [facility] with diarrhea, leukocytosis, and abdominal pain. She was seen by CONRAD Carmen at that time and underwent flexible sigmoidoscopy. This revealed diverticulosis in sigmoid colon but no evidence of inflammatory bowel disease. The 2/2022 CT scan of the abdomen and pelvis revealed extensive diverticulosis but no inflammatory bowel disease or infectious or other type of colitis. She did have an elevated ESR of 66, elevated CRP of 40.6, but stool cultures and C. difficile toxin were all negative. She was discharged home and is feeling much better. The patient has had multiple CT scans over the last 10 or more years. Some of these showed diverticulosis, a few if showed diverticulitis, and the several most recent CT scans showed some type of colitis or inflammatory state although flexible sigmoidoscopy did not show inflammation as above. She was hospitalized 3/2022 and had acute descending colon diverticulitis, confirmed on 2 CT scans. Treated with IV then oral antibiotics. Repeat CT scan 4/2022 showed diverticulosis without diverticulitis. 5/9/22 CT scan: There is colitis of the ascending and transverse colon either infectious or inflammatory or ischemic. Correlate with lactate levels. Severely atrophic native kidneys with bilateral renal cysts. Hyperdense structures suggesting hemorrhagic or high proteinaceous cyst in the midpole the right kidney.     Renal transplant in the right lower quadrant again with cysts and 2 hyperdense structures which may represent hemorrhagic or high proteinaceous cyst.    Colonic diverticulosis. No diverticilits mentioned. The patient denies nausea, vomiting, fever, chills, abdominal pain, reflux, dysphagia, change in bowel habits, diarrhea, constipation, weight loss, rectal bleeding, or melena. Last EGD-7/2020 which showed a hiatal hernia, nonobstructing Schatzki's ring, and gastritis. Last colonoscopy done 5/13/2022 elsewhere. This showed diverticulosis and large internal hemorrhoids. Random colon biopsies were done and showed some mild inflammation related to diverticular disease but not diverticulitis. Family history for GI disease is significant for no GI or liver disease. The patient denies liver related risk factors. Past medical history is extensive and significant for anemia, asthma, frequent urinary tract infections, chronic kidney disease on dialysis 3 times a week-Monday, Wednesday, Friday; failed kidney transplant, nonulcer dyspepsia with gastritis, esophageal reflux disease, hypercholesterolemia, hypertension, possible irritable bowel syndrome with use of Levsin, anxiety.         PMH:  Past Medical History:   Diagnosis Date    Anemia NEC     Arrhythmia     Asthma     Asthma     Burning with urination     frequent uti    Chronic kidney disease     dialysis    CMV (cytomegalovirus) antibody positive     [facility]     M/W/F    Dyspepsia and other specified disorders of function of stomach     stomach ulcer    Essential hypertension     GERD (gastroesophageal reflux disease)     History of chemotherapy     Hypercholesteremia     Hypertension     Menopause     Migraine     Obesity     Renal cyst 2002    kidney transplant     Ulcer         PSH:  Past Surgical History:   Procedure Laterality Date    COLONOSCOPY      Dr Frank Horvath  5yrs ago    ENDOSCOPY VISIT-OUTPATIENT      Dr Frank Horvath  5 yrs ago    ENDOSCOPY, COLON, DIAGNOSTIC      with Dr. Joao Morillo HX CHOLECYSTECTOMY  02/2021    HX GI      ulcer  HX HEENT      sinus surg    HX RENAL TRANSPLANT      HX TRANSPLANT      kidney transplant 2002    VASCULAR SURGERY PROCEDURE UNLIST      shunt in prep for dialysis        Allergies: Allergies   Allergen Reactions    Aspirin Rash and Unknown (comments)    Bactrim [Sulfamethoxazole-Trimethoprim] Rash and Unknown (comments)    Bromfenac Rash and Unknown (comments)    Cefepime Other (comments)     Neurological reaction    Ceftriaxone Rash    Copper Rash    Ibuprofen Rash and Unknown (comments)    Ketorolac Tromethamine Rash and Unknown (comments)    Relafen [Nabumetone] Rash and Unknown (comments)    Rifampin Rash and Unknown (comments)    Vancomycin Rash and Unknown (comments)     Pt reports causes itching, takes benadryl prior to use. Pt denies anaphylaxis. Requires benadryl with each vancomycin dose        Home Medications:  Cannot display prior to admission medications because the patient has not been admitted in this contact. Hospital Medications:  Current Outpatient Medications   Medication Sig    cephALEXin (KEFLEX) 500 mg capsule Take 1 Capsule by mouth three (3) times daily for 7 days.  sucralfate (CARAFATE) 1 gram tablet TAKE 1 TABLET BY MOUTH FOUR TIMES DAILY    montelukast (SINGULAIR) 10 mg tablet TAKE 1 TABLET BY MOUTH DAILY    nystatin (MYCOSTATIN) 100,000 unit/mL suspension Take 5 mL by mouth four (4) times daily. swish and spit    meclizine (ANTIVERT) 25 mg tablet Take 1 Tablet by mouth three (3) times daily as needed for Dizziness.  ondansetron (ZOFRAN ODT) 4 mg disintegrating tablet Take 1 Tablet by mouth every eight (8) hours as needed for Nausea or Nausea or Vomiting.  carvediloL (COREG) 25 mg tablet Take 1 Tablet by mouth two (2) times daily (with meals).  levothyroxine (SYNTHROID) 75 mcg tablet Take 1 Tablet by mouth Daily (before breakfast).  escitalopram oxalate (LEXAPRO) 10 mg tablet Take 1 Tablet by mouth daily.     apixaban (ELIQUIS) 2.5 mg tablet Take 2.5 mg by mouth two (2) times a day.  predniSONE (DELTASONE) 5 mg tablet Take 1 Tablet by mouth daily.  clopidogreL (Plavix) 75 mg tab Take 1 Tablet by mouth daily.  fluticasone-umeclidinium-vilanterol (Trelegy Ellipta) 100-62.5-25 mcg inhaler Take 1 Puff by inhalation daily.  hyoscyamine SL (LEVSIN/SL) 0.125 mg SL tablet 1 Tablet by SubLINGual route every four (4) hours as needed (irritable bowel).  Dexilant 60 mg CpDB capsule (delayed release) TAKE 1 CAPSULE BY MOUTH EVERY DAY    albuterol (PROVENTIL VENTOLIN) 2.5 mg /3 mL (0.083 %) nebu USE 1 VIAL VIA NEBULIZER THREE TIMES DAILY    gabapentin (NEURONTIN) 100 mg capsule 2 capsules TID    amiodarone (CORDARONE) 200 mg tablet TAKE 1 TABLET BY MOUTH EVERY DAY    valGANciclovir (VALCYTE) 450 mg tablet TAKE 2 TABLETS BY MOUTH DAILY    losartan (COZAAR) 50 mg tablet Take 1 Tablet by mouth daily.  calcitRIOL (ROCALTROL) 0.25 mcg capsule Take 1 Capsule by mouth as directed. Take on non dialysis days    RenaPlex-D 800 mcg-12.5 mg -2,000 unit tab Take 1 Tablet by mouth daily.  sevelamer carbonate (RENVELA) 800 mg tab tab Take 800 mg by mouth three (3) times daily.  zolpidem (AMBIEN) 5 mg tablet Take 5 mg by mouth nightly as needed.  simvastatin (ZOCOR) 20 mg tablet TAKE 1 TABLET BY MOUTH DAILY AS DIRECTED.  aluminum & magnesium hydroxide-simethicone (Maalox Maximum Strength) 400-400-40 mg/5 mL suspension Take 10 mL by mouth every six (6) hours as needed for Indigestion.  ESTRACE 0.01 % (0.1 mg/gram) vaginal cream INSERT 2 GRAMS INTO VAGINA EVERY MONDAY AND THURSDAY    cranberry extract 425 mg cap 425 mg.    fluticasone (FLONASE) 50 mcg/actuation nasal spray 1 Spray.  EPINEPHrine (EPIPEN) 0.3 mg/0.3 mL injection 0.3 mg. No current facility-administered medications for this visit.         Social History:  Social History     Tobacco Use    Smoking status: Never Smoker    Smokeless tobacco: Never Used   Substance Use Topics    Alcohol use: No        Pt denies any history of IV drug use, blood transfusions. Family History:  Family History   Problem Relation Age of Onset    Colon Polyps Brother     Cervical Cancer Mother     Diabetes Father         Review of Systems:  A detailed 10 system ROS is obtained, with pertinent positives as listed above. All others are negative unless listed in history above. Constitutional denies fever chills, headache, or weight loss. Skin- denies lesions or rashes. HEENT- denies any vision or hearing problems, epistaxis, sore throat, or dental problems. Lungs- no shortness of breath or chest pain reported, no dyspnea on exertion. Cardiac- no palpitations or chest pain reported, including at rest or on exertion. GI-no abdominal pain, melena, rectal bleeding, reflux, dysphagia, jaundice, change in stool or urine color, constipation or diarrhea. Genitourinary -no dysuria or hematuria. Musculoskeletal-no muscle weakness or disuse or atrophy. Neurologic-no numbness, tingling, gait disturbance, or other abnormalities. Rheumatologic- patient denies any immune or rheumatologic diseases or symptoms. Endocrine- patient denies any endocrine abnormalities including thyroid disease or diabetes. Psychologic-patient denies depression, anxiety or emotional issues. No reported memory issues. Objective:     Physical Exam:  Vitals: There were no vitals taken for this visit. Gen:  Pt is alert, cooperative, no acute distress  Skin:  Extremities and face reveal no rashes. No romero erythema. No telangiectasias on the chest wall. HEENT: Sclerae anicteric. Extra-occular muscles are intact. No oral ulcers. No abnormal pigmentation of the lips. The neck is supple. Cardiovascular: Regular rate and rhythm. No murmurs, gallops, or rubs. Respiratory:  Comfortable breathing with no accessory muscle use. Clear breath sounds anteriorly with no wheezes, rales, or rhonchi.   GI:  Abdomen nondistended, soft, and nontender. Normal active bowel sounds. No enlargement of the liver or spleen. No masses palpable. Rectal:  Deferred  Musculoskeletal:   No costovertebral tenderness. No localized muscle weakness, or decreased range of motion. Extremities:  No palpable cords or pitting edema of the lower legs. Extremities have good range of motion. Neurological:  Gross memory appears intact. Patient is alert and oriented. Psychiatric:  Mood appears appropriate with judgement intact. Lymphatic:  No cervical or supraclavicular adenopathy.     Laboratory:        Lab Results   Component Value Date    AML 45 05/25/2021    LPSE 49 04/07/2022     04/08/2022    K 4.6 04/08/2022    CL 99 04/08/2022    CO2 21 04/08/2022    AGAP 16 04/08/2022    GLU 75 04/08/2022    BUN 39 (H) 04/08/2022    CREA 9.60 (H) 04/08/2022    BUCR 4 04/08/2022    GFRAA 5 04/08/2022    GFRNA 4 04/08/2022    CA 7.8 (L) 04/08/2022    TBILI 0.8 04/07/2022    AST 21 04/07/2022    ALT 17 04/07/2022     04/07/2022    TP 6.2 04/07/2022    ALB 3.4 (L) 04/07/2022    GLOB 2.8 04/07/2022    AGRAT 1.2 04/07/2022    WBC 3.2 (L) 04/08/2022    RBC 3.17 (L) 04/08/2022    HGB 10.0 (L) 04/08/2022    HCT 34.5 (L) 04/08/2022    .8 (H) 04/08/2022    MCH 31.5 04/08/2022    MCHC 29.0 (L) 04/08/2022    RDW 21.7 (H) 04/08/2022     04/08/2022    MPLV 9.4 04/08/2022    GRANS 40 04/07/2022    LYMPH 38 04/07/2022    MONOS 20 (H) 04/07/2022    EOS 0 04/07/2022    BASOS 1 04/07/2022    IG 1 (H) 04/07/2022    ANEU 1.6 (L) 04/07/2022    ABL 1.5 04/07/2022    ABM 0.8 04/07/2022    SUGAR 0.0 04/07/2022    ABB 0.0 04/07/2022    AIG 0.0 04/07/2022   results  Lab Results   Component Value Date    AML 45 05/25/2021    LPSE 49 04/07/2022     04/08/2022    K 4.6 04/08/2022    CL 99 04/08/2022    CO2 21 04/08/2022    AGAP 16 04/08/2022    GLU 75 04/08/2022    BUN 39 (H) 04/08/2022    CREA 9.60 (H) 04/08/2022    BUCR 4 04/08/2022    GFRAA 5 04/08/2022    GFRNA 4 04/08/2022    CA 7.8 (L) 04/08/2022    TBILI 0.8 04/07/2022    AST 21 04/07/2022    ALT 17 04/07/2022     04/07/2022    TP 6.2 04/07/2022    ALB 3.4 (L) 04/07/2022    GLOB 2.8 04/07/2022    AGRAT 1.2 04/07/2022    WBC 3.2 (L) 04/08/2022    RBC 3.17 (L) 04/08/2022    HGB 10.0 (L) 04/08/2022    HCT 34.5 (L) 04/08/2022    .8 (H) 04/08/2022    MCH 31.5 04/08/2022    MCHC 29.0 (L) 04/08/2022    RDW 21.7 (H) 04/08/2022     04/08/2022    MPLV 9.4 04/08/2022    GRANS 40 04/07/2022    LYMPH 38 04/07/2022    MONOS 20 (H) 04/07/2022    EOS 0 04/07/2022    BASOS 1 04/07/2022    IG 1 (H) 04/07/2022    ANEU 1.6 (L) 04/07/2022    ABL 1.5 04/07/2022    ABM 0.8 04/07/2022    SUGAR 0.0 04/07/2022    ABB 0.0 04/07/2022    AIG 0.0 04/07/2022        CT scan of abdomen and pelvis -  CT Results (most recent):  Results from Hospital Encounter encounter on 04/07/22    CT ABD PELV WO CONT    Narrative  EXAM: CT ABDOMEN AND PELVIS (NON-CONTRAST)    INDICATION: Dysuria, weakness    COMPARISON: CT abdomen pelvis 2/10/2022    TECHNIQUE: Axial CT imaging of the abdomen and pelvis was performed without IV  or oral contrast as per specific clinician request.  Multiplanar reformats were  generated. One or more dose reduction techniques were used on this CT:  automated exposure control, adjustment of the mAs and/or kVp according to  patient's size, and iterative reconstruction techniques. The specific techniques  utilized on this CT exam have been documented in the patient's electronic  medical record. Digital Imaging and Communications in Medicine (DICOM) format  image data are available to nonaffiliated external healthcare facilities or  entities on a secure, media free, reciprocally searchable basis with patient  authorization for at least a 12-month period after this study. _______________    FINDINGS:    The lack of oral and IV contrast limits evaluation of the solid organs and  bowel.     LOWER CHEST: The visualized lung bases are clear. Very small right-sided  pleural effusion is present, improved. Hiatal hernia is present. LIVER, BILIARY: Liver is unremarkable. No abnormal biliary dilation. Gallbladder  is surgically absent. PANCREAS: Previously seen peripancreatic inflammatory stranding has resolved. No  peripancreatic fluid collection is present. SPLEEN: Unremarkable. ADRENALS: Unremarkable. KIDNEYS: Atrophic bilateral native kidneys are again seen. Left renal upper pole  simple cyst is again seen, unchanged. No follow-up imaging is recommended as  incidental lesion is likely benign. Right lower quadrant transplant kidney is again seen. Mild amount of perinephric  fluid and stranding is again seen. No hydronephrosis is present. Stable  hypodense lesion is present with small calcified rim, unchanged. LYMPH NODES: No enlarged lymph nodes by size criteria. GASTROINTESTINAL TRACT: The lack of oral contrast limits bowel evaluation. No  abnormal bowel dilation or wall thickening. Appendix is within normal limits. Several scattered colonic diverticula are present without CT evidence of acute  diverticulitis. PELVIC ORGANS: Mild diffuse urinary bladder wall thickening is present. Uterus  and adnexa are unremarkable. VASCULATURE: Atherosclerotic calcifications are present. OSSEOUS: Degenerative changes are seen in the spine. Focal sclerotic changes  again seen along the anterosuperior left femoral head from prior avascular  necrosis. OTHER: None.    _______________    Impression  1. Urinary bladder wall thickening is present, nonspecific perhaps cystitis in  the appropriate setting. 2. Stable right lower quadrant transplant kidney with mild adjacent nonspecific  stranding. No hydronephrosis. 3. Interval resolution of previously seen peripancreatic inflammatory changes  and ascites. 4. Small residual right pleural effusion improved. Resolution of left pleural  effusion. Abdominal US- No results  US Results (most recent):  Results from Abstract encounter on 13    US RETROPERITONEUM COMP    Narrative  Auth Prov: Simin Burden  CC Prov:              [address]  [address] [facility] [address]        Imaging Result      Name:  Saba Whatley ([id number])  Sex: Female  :    66year old  Patient Class: Outpatient Private  Diagnosis:  Kidney replaced by transplant [V42.0 (ICD-9-CM)]        Procedures Performed:  Exam Date/Time:  Reason for Exam:  US RETROPERITONEAL  [id number]   2013 10:40 AM    None Specified              History: Renal failure with kidney transplant. Procedure: Retroperitoneal ultrasound. [id number]    Comparison:Retroperitoneal ultrasound 12. Findings: Native kidneys are both small and echogenic. Right  kidney measures 5 x 1.9 x 2.0 cm. It contains a tiny hypoechoic  area in the upper pole consistent with cyst.  This is stable. Left kidney measures 9.1 x 3.1 x 2.6 cm. There are 2 cysts in the  upper pole. These were present previously. There is a septation  in the larger cyst which measures 3 cm in greatest dimension. No  solid masses or hydronephrosis involving the native kidneys. Transplant kidney in the right lower quadrant is normal measuring  11.9 x 4.9 by her centimeters. It contains at least 2 small  cysts, largest measuring 1.6 cm. No solid mass or  hydronephrosis. Bladder is normal in appearance. Transplanted left ureteral jet  was identified. IMPRESSION  Impression:    1. Native kidneys are echogenic and contain bilateral cysts. One on the left is septated but stable. 2.  Transplanted kidney right lower quadrant contains several  small simple cysts. It is otherwise normal.    3.  Grossly normal bladder.           Dictated by: Chiquita Purpura on  12:11:52 PM EDT      Signed Luca Hall MD 2013 12:16 PM                            [facility] [[id number]]           MRI and MRCP- No results  MRI Results (most recent):  Results from Office Visit encounter on 05/09/12    MRI PELV WO CONT             Assessment:     1. Recent acute colitis of unknown etiology. This appears to be resolved. Recent repeat colonoscopy done 13 days ago showed no distinct ischemia or diverticulitis on biopsies. Stool studies were negative and flexible sigmoidoscopy showed no abnormality in the left colon. This could have been transient ischemia, due to microvascular disease or embolus since she does have a history of atrial fibrillation/flutter. A viral illness, or other etiology are also possibilities. It appears to have resolved. She did have elevated sedimentation rate and CRP suggesting acute phase reactants. Again inflammatory bowel disease possibility, but less likely given prior negative colonoscopies and extensive diverticulosis without diverticulitis recently. 2. Abdominal pain. Her records are extensive and I discussed in detail today what I think is occurring with this particular patient. Her daughter was also present. She has had 5 CT scans in the last 3 months. The first CT scan done during 3/2022  admission showed left-sided diverticulitis. A second done 2 weeks later showed resolving diverticulitis. 1 done in January, another in April, and a third done earlier this month did not show diverticulitis. It just showed diverticulosis. She did however have acute colitis on the CT scan as recently as 5/9/2022. I think she has 2 separate things occurring. The first is she has diverticulitis at times. There are other times she gets left-sided pain and I think she has SCAD (segmental colitis associated with diverticulosis) at times as well but not with diverticulitis.   The the second, is acute low-grade colitis likely related to ischemia, which usually is not in the left colon and most recently was seen in the transverse and ascending colon. This however is mild as confirmed on her recent colonoscopy with biopsies. I feel she gets low-grade ischemia due to vascular disease, hypertension, and possibly even due to an embolus or with thrombus related to her atrial fibrillation. All these factors can cause low-grade ischemia. Unfortunately treatment of her blood pressure making sure she is not dehydrated as best as can be done even with fluid shifts related to dialysis is the treatment for the latter. 3. Colon cancer screening. She just recently had colonoscopy done 5/13/2022 and does not need a repeat study. 4. Chronic hemodialysis with chronic renal failure. She has dialysis every Monday, Wednesday, Friday. 5. History of atrial fibrillation/flutter. She is on amiodarone. 6. Hyperlipidemia. She is on oral medication for this problem. 7. Diverticulosis. This is apparently extensive. 8. Hypothyroidism. She is on thyroid replacement. 9. Failed kidney transplant. She is on hemodialysis. 10. Anticoagulation use. The patient is on Plavix daily. Went to her chart she is also on Eliquis 2.5 mg daily. 11. Esophageal reflux disease. Patient is on both sucralfate. Plan:     1. Recommend colon cancer screening 5/2032. Not need another colonoscopy at this time. 2. Observation and reassurance. No further GI evaluation is indicated. 3. Told the patient and her daughter for now, as she has diverticulitis which proved to be on her left side as she does not have diverticulosis on the right side, that this can be treated with antibiotics. However if she has persistent or recurrent symptoms and has SCAD, which can mimic symptoms of diverticulitis, she may need to have segmental resection of her left colon. 4. Regarding her transverse and right colon, this appears to at times get acute colitis, probably related to ischemia.   I would not recommend having any resection here unless this was so severe she was developing gangrene or had severe acute colitis. 5. But all their questions today and told him that this is quite complicated but they seem to understand. 6. Further recommendations pending the patient's clinical course, if needed in the future. 7. The patient will follow up with me as needed.           Durga Myles MD

## 2022-05-26 ENCOUNTER — OFFICE VISIT (OUTPATIENT)
Dept: GASTROENTEROLOGY | Age: 67
End: 2022-05-26
Payer: MEDICARE

## 2022-05-26 DIAGNOSIS — R42 DIZZINESS: ICD-10-CM

## 2022-05-26 DIAGNOSIS — K57.32 DIVERTICULITIS LARGE INTESTINE W/O PERFORATION OR ABSCESS W/O BLEEDING: ICD-10-CM

## 2022-05-26 DIAGNOSIS — K21.9 GASTROESOPHAGEAL REFLUX DISEASE WITHOUT ESOPHAGITIS: ICD-10-CM

## 2022-05-26 DIAGNOSIS — T86.12 KIDNEY TRANSPLANT FAILURE: ICD-10-CM

## 2022-05-26 DIAGNOSIS — I10 PRIMARY HYPERTENSION: ICD-10-CM

## 2022-05-26 DIAGNOSIS — J45.20 INTERMITTENT ASTHMA, UNSPECIFIED ASTHMA SEVERITY, UNSPECIFIED WHETHER COMPLICATED: ICD-10-CM

## 2022-05-26 DIAGNOSIS — Z99.2 HEMODIALYSIS ACCESS, AV GRAFT (HCC): ICD-10-CM

## 2022-05-26 DIAGNOSIS — Z12.11 COLON CANCER SCREENING: ICD-10-CM

## 2022-05-26 DIAGNOSIS — K52.9 ACUTE COLITIS: Primary | ICD-10-CM

## 2022-05-26 DIAGNOSIS — R93.3 ABNORMAL CT SCAN, GASTROINTESTINAL TRACT: ICD-10-CM

## 2022-05-26 DIAGNOSIS — N18.4 CHRONIC RENAL FAILURE, STAGE 4 (SEVERE) (HCC): ICD-10-CM

## 2022-05-26 PROCEDURE — 99204 OFFICE O/P NEW MOD 45 MIN: CPT | Performed by: INTERNAL MEDICINE

## 2022-05-26 NOTE — PROGRESS NOTES
Rosibelgiulia Kowalskiada presents today for   Chief Complaint   Patient presents with   Antonette Outhouse New Patient     patient is having problems with diverticulitis had a colonoscopy done already earlier this month       Is someone accompanying this pt? Yes nicol HackettSamiabony Brito    Is the patient using any DME equipment during OV? no    Depression Screening:  3 most recent PHQ Screens 5/26/2022   Little interest or pleasure in doing things Not at all   Feeling down, depressed, irritable, or hopeless Not at all   Total Score PHQ 2 0       Learning Assessment:  Learning Assessment 8/17/2021   PRIMARY LEARNER Patient   HIGHEST LEVEL OF EDUCATION - PRIMARY LEARNER  SOME COLLEGE   BARRIERS PRIMARY LEARNER NONE   PRIMARY LANGUAGE ENGLISH   LEARNER PREFERENCE PRIMARY DEMONSTRATION   LEARNING SPECIAL TOPICS NO   ANSWERED BY SELF   RELATIONSHIP SELF       Fall Risk  Fall Risk Assessment, last 12 mths 12/24/2020   Able to walk? Yes   Fall in past 12 months? 0   Do you feel unsteady? 0   Are you worried about falling 0   Number of falls in past 12 months -   Fall with injury? -       Coordination of Care:  1. Have you been to the ER, urgent care clinic since your last visit? Hospitalized since your last visit? no    2. Have you seen or consulted any other health care providers outside of the [address] [place] [address] since your last visit? Include any pap smears or colon screening.  Yes, PCP [facility] [place]

## 2022-05-26 NOTE — LETTER
[id number]    Patient: Courtney Ruffin   YOB: 1955   Date of Visit: 5/26/2022     Cynthia Flores, [address]  [address] [id number]  [address] [facility] [address]    Dear Cynthia Flores MD,      Thank you for referring Ms. Debra Sellers to [address] [place] [address] for evaluation. My notes for this consultation are attached. If you have questions, please do not hesitate to call me. I look forward to following your patient along with you.       Sincerely,    Kike Michael MD

## 2022-05-30 RX ORDER — ZOLPIDEM TARTRATE 5 MG/1
5 TABLET ORAL
Qty: 30 TABLET | Refills: 0 | Status: SHIPPED | OUTPATIENT
Start: 2022-05-30 | End: 2022-11-03

## 2022-05-30 RX ORDER — CLOPIDOGREL BISULFATE 75 MG/1
75 TABLET ORAL DAILY
Qty: 90 TABLET | Refills: 1 | Status: SHIPPED | OUTPATIENT
Start: 2022-05-30 | End: 2022-07-27 | Stop reason: SDUPTHER

## 2022-05-30 RX ORDER — MONTELUKAST SODIUM 10 MG/1
10 TABLET ORAL DAILY
Qty: 90 TABLET | Refills: 0 | Status: SHIPPED | OUTPATIENT
Start: 2022-05-30 | End: 2022-07-27 | Stop reason: SDUPTHER

## 2022-05-30 NOTE — PROGRESS NOTES
Subjective:   Ave Marcum is a 61 y.o. female who was seen for Hospital Follow Up    Patient presents today for hospital follow-up after being discharged for abdominal pain. She had abdominal pain, nausea and vomiting which made her miss dialysis. She developed hyperkalemia and was admitted due to that. She also had a colonoscopy done while inpatient which showed diverticulosis. No signs of colitis found on CT or colonoscopy. Patient is currently feeling well and has no abdominal pain. She does have some urinary frequency and dysuria. She has been UTIs. Has not followed with urology. Home Medications    Medication Sig Start Date End Date Taking? Authorizing Provider   clopidogreL (Plavix) 75 mg tab Take 1 Tablet by mouth daily. 5/30/22  Yes Jessica Buckley MD   zolpidem (AMBIEN) 5 mg tablet Take 1 Tablet by mouth nightly as needed for Sleep. Max Daily Amount: 5 mg. 5/30/22  Yes Jessica Buckley MD   montelukast (SINGULAIR) 10 mg tablet Take 1 Tablet by mouth daily. 5/30/22  Yes Jessica Buckley MD   sucralfate (CARAFATE) 1 gram tablet TAKE 1 TABLET BY MOUTH FOUR TIMES DAILY 5/19/22  Yes Jessica Buckley MD   nystatin (MYCOSTATIN) 100,000 unit/mL suspension Take 5 mL by mouth four (4) times daily. swish and spit 4/14/22   Jessica Buckley MD   meclizine (ANTIVERT) 25 mg tablet Take 1 Tablet by mouth three (3) times daily as needed for Dizziness. 4/12/22   Jessica Buckley MD   ondansetron (ZOFRAN ODT) 4 mg disintegrating tablet Take 1 Tablet by mouth every eight (8) hours as needed for Nausea or Nausea or Vomiting. 4/12/22   Jessica Buckley MD   carvediloL (COREG) 25 mg tablet Take 1 Tablet by mouth two (2) times daily (with meals). 4/12/22   Jessica Buckley MD   levothyroxine (SYNTHROID) 75 mcg tablet Take 1 Tablet by mouth Daily (before breakfast). 4/12/22   Jessica Buckley MD   escitalopram oxalate (LEXAPRO) 10 mg tablet Take 1 Tablet by mouth daily.  4/12/22   Jessica Buckley MD   apixaban (ELIQUIS) 2.5 mg tablet Take 2.5 mg by mouth two (2) times a day. Provider, Historical   predniSONE (DELTASONE) 5 mg tablet Take 1 Tablet by mouth daily. Provider, Historical   fluticasone-umeclidinium-vilanterol (Trelegy Ellipta) 100-62.5-25 mcg inhaler Take 1 Puff by inhalation daily. 1/20/22   Emma Krishna MD   hyoscyamine SL (LEVSIN/SL) 0.125 mg SL tablet 1 Tablet by SubLINGual route every four (4) hours as needed (irritable bowel). 1/17/22   Emma Krishna MD   Dexilant 60 mg CpDB capsule (delayed release) TAKE 1 CAPSULE BY MOUTH EVERY DAY 1/13/22   Emma Krishna MD   albuterol (PROVENTIL VENTOLIN) 2.5 mg /3 mL (0.083 %) nebu USE 1 VIAL VIA NEBULIZER THREE TIMES DAILY 12/23/21   Emma Krishna MD   gabapentin (NEURONTIN) 100 mg capsule 2 capsules TID 12/22/21   Husam Jackson MD   amiodarone (CORDARONE) 200 mg tablet TAKE 1 TABLET BY MOUTH EVERY DAY 9/18/21   Rakesh Ham MD   valGANciclovir (VALCYTE) 450 mg tablet TAKE 2 TABLETS BY MOUTH DAILY 9/18/21   Rakesh Ham MD   losartan (COZAAR) 50 mg tablet Take 1 Tablet by mouth daily. 8/24/21   Rakesh Ham MD   calcitRIOL (ROCALTROL) 0.25 mcg capsule Take 1 Capsule by mouth as directed. Take on non dialysis days 7/19/21   Abelardo Yepez MD   RenaPlex-D 800 mcg-12.5 mg -2,000 unit tab Take 1 Tablet by mouth daily. 4/15/21   Abelardo Yepez MD   sevelamer carbonate (RENVELA) 800 mg tab tab Take 800 mg by mouth three (3) times daily. 6/8/21   Abelardo Yepez MD   simvastatin (ZOCOR) 20 mg tablet TAKE 1 TABLET BY MOUTH DAILY AS DIRECTED. 2/19/21   Rakesh Ham MD   aluminum & magnesium hydroxide-simethicone (Maalox Maximum Strength) 400-400-40 mg/5 mL suspension Take 10 mL by mouth every six (6) hours as needed for Indigestion.  9/17/20   Iza Juarez MD   ESTRACE 0.01 % (0.1 mg/gram) vaginal cream INSERT 2 GRAMS INTO VAGINA EVERY MONDAY AND THURSDAY 4/11/17   Ryan Gale MD   cranberry extract 425 mg cap 425 mg. 1/17/14   Provider, Historical   fluticasone (FLONASE) 50 mcg/actuation nasal spray 1 Spray. Provider, Historical   EPINEPHrine (EPIPEN) 0.3 mg/0.3 mL injection 0.3 mg. 10/1/16   Provider, Historical      Allergies   Allergen Reactions    Aspirin Rash and Unknown (comments)    Bactrim [Sulfamethoxazole-Trimethoprim] Rash and Unknown (comments)    Bromfenac Rash and Unknown (comments)    Cefepime Other (comments)     Neurological reaction    Ceftriaxone Rash    Copper Rash    Ibuprofen Rash and Unknown (comments)    Ketorolac Tromethamine Rash and Unknown (comments)    Relafen [Nabumetone] Rash and Unknown (comments)    Rifampin Rash and Unknown (comments)    Vancomycin Rash and Unknown (comments)     Pt reports causes itching, takes benadryl prior to use. Pt denies anaphylaxis. Requires benadryl with each vancomycin dose     Social History     Tobacco Use    Smoking status: Never Smoker    Smokeless tobacco: Never Used   Vaping Use    Vaping Use: Never used   Substance Use Topics    Alcohol use: No    Drug use: No        Review of Systems   All other systems reviewed and are negative. Objective:     Visit Vitals  BP (!) 156/78 (BP 1 Location: Right upper arm, BP Patient Position: Sitting, BP Cuff Size: Adult)   Pulse 66   Ht 5' 1\" (1.549 m)   Wt 148 lb 12.8 oz (67.5 kg)   SpO2 99%   BMI 28.12 kg/m²        General: alert, oriented, not in distress  Chest/Lungs: clear breath sounds, no wheezing or crackles  Heart: normal rate, regular rhythm, no murmur  Abdomen: soft, non-distended, non-tender, normal bowel sounds, no organomegaly, no masses  Extremities: no focal deformities, no edema  Skin: no active skin lesions      Assessment & Plan:     1. Recurrent UTI  Currently symptomatic. Will start Keflex. We will make referral to urology due to recurrent UTIs. - REFERRAL TO UROLOGY  - AMB POC URINALYSIS DIP STICK AUTO W/O MICRO    2. Chronic abdominal pain  Colonoscopy showed diverticulosis.   Could have some ischemic component causing her frequent abdominal pains due to history of A. fib and vascular disease. No intervention needed at this moment. 3. Primary insomnia  Refilled Ambien today  - zolpidem (AMBIEN) 5 mg tablet; Take 1 Tablet by mouth nightly as needed for Sleep. Max Daily Amount: 5 mg. Dispense: 30 Tablet; Refill: 0     45 minutes spent reviewing discharge summary, consult notes, labs, imaging, communicating with nursing staff, patient encounter, charting          I have discussed the diagnosis with the patient and the intended plan as seen in the above orders. The patient has received an after-visit summary and questions were answered concerning future plans. I have discussed medication side effects and warnings with the patient as well. I have reviewed the plan of care with the patient, accepted their input and they are in agreement with the treatment goals. Previous lab and imaging results were reviewed by me.        Xochilt Peraza MD  May 30, 2022

## 2022-06-02 ENCOUNTER — TELEPHONE (OUTPATIENT)
Dept: FAMILY MEDICINE CLINIC | Age: 67
End: 2022-06-02

## 2022-06-02 NOTE — TELEPHONE ENCOUNTER
----- Message from Jackie Kenneth sent at 6/2/2022  2:58 PM EDT -----  Subject: Message to Provider    QUESTIONS  Information for Provider? pt is calling to see if paperwork pertaining to   insurance is complete would like a call back as soon as possible   ---------------------------------------------------------------------------  --------------  CALL BACK INFO  What is the best way for the office to contact you? OK to leave message on   voicemail  Preferred Call Back Phone Number? [phone]  ---------------------------------------------------------------------------  --------------  SCRIPT ANSWERS  Relationship to Patient?  Self

## 2022-06-15 ENCOUNTER — PATIENT OUTREACH (OUTPATIENT)
Dept: CASE MANAGEMENT | Age: 67
End: 2022-06-15

## 2022-06-15 NOTE — PROGRESS NOTES
Patient has graduated from the Transitions of Care Coordination  program on 6/15/22. Patient reported  that she is doing well. No new or worsening of symptoms reported by Pt. At this time. No questions, concerns and/or needs at this time as per Pt. Patient's symptoms are stable at this time. Patient/family has the ability to self-manage. Care management goals have been completed at this time. No further care transitions nurse follow up scheduled. Patient has care transitions nurse contact information for any further questions, concerns, or needs. This episode is closed and resolved.      Patient's upcoming visits:    Future Appointments   Date Time Provider Elena Coello   8/4/2022  1:00 PM NYA Kim   8/18/2022  1:45 PM Dylan Higuera MD [facility] BS AMB

## 2022-07-19 ENCOUNTER — OFFICE VISIT (OUTPATIENT)
Dept: FAMILY MEDICINE CLINIC | Age: 67
End: 2022-07-19
Payer: MEDICARE

## 2022-07-19 VITALS
HEART RATE: 65 BPM | SYSTOLIC BLOOD PRESSURE: 145 MMHG | WEIGHT: 142 LBS | BODY MASS INDEX: 26.81 KG/M2 | HEIGHT: 61 IN | OXYGEN SATURATION: 99 % | DIASTOLIC BLOOD PRESSURE: 75 MMHG | TEMPERATURE: 98.7 F

## 2022-07-19 DIAGNOSIS — K57.30 SEGMENTAL COLITIS ASSOCIATED WITH DIVERTICULOSIS (HCC): Primary | ICD-10-CM

## 2022-07-19 DIAGNOSIS — K50.10 SEGMENTAL COLITIS ASSOCIATED WITH DIVERTICULOSIS (HCC): Primary | ICD-10-CM

## 2022-07-19 DIAGNOSIS — N39.0 RECURRENT UTI: ICD-10-CM

## 2022-07-19 LAB
BILIRUB UR QL STRIP: NEGATIVE
GLUCOSE UR-MCNC: NEGATIVE MG/DL
KETONES P FAST UR STRIP-MCNC: NEGATIVE MG/DL
PH UR STRIP: 7.5 [PH] (ref 4.6–8)
PROT UR QL STRIP: NORMAL
SP GR UR STRIP: 1.02 (ref 1–1.03)
UA UROBILINOGEN AMB POC: NORMAL (ref 0.2–1)
URINALYSIS CLARITY POC: CLEAR
URINALYSIS COLOR POC: YELLOW
URINE BLOOD POC: NORMAL
URINE LEUKOCYTES POC: NORMAL
URINE NITRITES POC: NEGATIVE

## 2022-07-19 PROCEDURE — G9899 SCRN MAM PERF RSLTS DOC: HCPCS | Performed by: STUDENT IN AN ORGANIZED HEALTH CARE EDUCATION/TRAINING PROGRAM

## 2022-07-19 PROCEDURE — 1101F PT FALLS ASSESS-DOCD LE1/YR: CPT | Performed by: STUDENT IN AN ORGANIZED HEALTH CARE EDUCATION/TRAINING PROGRAM

## 2022-07-19 PROCEDURE — G8400 PT W/DXA NO RESULTS DOC: HCPCS | Performed by: STUDENT IN AN ORGANIZED HEALTH CARE EDUCATION/TRAINING PROGRAM

## 2022-07-19 PROCEDURE — 1090F PRES/ABSN URINE INCON ASSESS: CPT | Performed by: STUDENT IN AN ORGANIZED HEALTH CARE EDUCATION/TRAINING PROGRAM

## 2022-07-19 PROCEDURE — 99214 OFFICE O/P EST MOD 30 MIN: CPT | Performed by: STUDENT IN AN ORGANIZED HEALTH CARE EDUCATION/TRAINING PROGRAM

## 2022-07-19 PROCEDURE — G9231 DOC ESRD DIA TRANS PREG: HCPCS | Performed by: STUDENT IN AN ORGANIZED HEALTH CARE EDUCATION/TRAINING PROGRAM

## 2022-07-19 PROCEDURE — G8428 CUR MEDS NOT DOCUMENT: HCPCS | Performed by: STUDENT IN AN ORGANIZED HEALTH CARE EDUCATION/TRAINING PROGRAM

## 2022-07-19 PROCEDURE — 81003 URINALYSIS AUTO W/O SCOPE: CPT | Performed by: STUDENT IN AN ORGANIZED HEALTH CARE EDUCATION/TRAINING PROGRAM

## 2022-07-19 PROCEDURE — 3017F COLORECTAL CA SCREEN DOC REV: CPT | Performed by: STUDENT IN AN ORGANIZED HEALTH CARE EDUCATION/TRAINING PROGRAM

## 2022-07-19 PROCEDURE — G8432 DEP SCR NOT DOC, RNG: HCPCS | Performed by: STUDENT IN AN ORGANIZED HEALTH CARE EDUCATION/TRAINING PROGRAM

## 2022-07-19 PROCEDURE — 1123F ACP DISCUSS/DSCN MKR DOCD: CPT | Performed by: STUDENT IN AN ORGANIZED HEALTH CARE EDUCATION/TRAINING PROGRAM

## 2022-07-19 PROCEDURE — G8536 NO DOC ELDER MAL SCRN: HCPCS | Performed by: STUDENT IN AN ORGANIZED HEALTH CARE EDUCATION/TRAINING PROGRAM

## 2022-07-19 PROCEDURE — G8417 CALC BMI ABV UP PARAM F/U: HCPCS | Performed by: STUDENT IN AN ORGANIZED HEALTH CARE EDUCATION/TRAINING PROGRAM

## 2022-07-19 RX ORDER — DICYCLOMINE HYDROCHLORIDE 10 MG/1
CAPSULE ORAL
COMMUNITY
Start: 2022-05-14 | End: 2022-07-27 | Stop reason: SDUPTHER

## 2022-07-19 RX ORDER — CIPROFLOXACIN 500 MG/1
500 TABLET ORAL 2 TIMES DAILY
Qty: 20 TABLET | Refills: 0 | Status: SHIPPED | OUTPATIENT
Start: 2022-07-19 | End: 2022-07-29

## 2022-07-19 RX ORDER — METRONIDAZOLE 250 MG/1
250 TABLET ORAL 3 TIMES DAILY
Qty: 30 TABLET | Refills: 0 | Status: SHIPPED | OUTPATIENT
Start: 2022-07-19 | End: 2022-07-29

## 2022-07-19 RX ORDER — LIDOCAINE AND PRILOCAINE 25; 25 MG/G; MG/G
CREAM TOPICAL
COMMUNITY
Start: 2022-06-27 | End: 2022-09-22 | Stop reason: ALTCHOICE

## 2022-07-19 NOTE — PROGRESS NOTES
Trinh Trevino presents today for   Chief Complaint   Patient presents with    Urinary Burning       Is someone accompanying this pt? No     Is the patient using any DME equipment during OV? No     Depression Screening:  3 most recent PHQ Screens 7/19/2022   Little interest or pleasure in doing things Not at all   Feeling down, depressed, irritable, or hopeless Not at all   Total Score PHQ 2 0       Learning Assessment:  Learning Assessment 8/17/2021   PRIMARY LEARNER Patient   HIGHEST LEVEL OF EDUCATION - PRIMARY LEARNER  SOME COLLEGE   BARRIERS PRIMARY LEARNER NONE   PRIMARY LANGUAGE ENGLISH   LEARNER PREFERENCE PRIMARY DEMONSTRATION   LEARNING SPECIAL TOPICS NO   ANSWERED BY SELF   RELATIONSHIP SELF       Fall Risk  Fall Risk Assessment, last 12 mths 12/24/2020   Able to walk? Yes   Fall in past 12 months? 0   Do you feel unsteady? 0   Are you worried about falling 0   Number of falls in past 12 months -   Fall with injury? -       Health Maintenance reviewed and discussed and ordered per Provider. Health Maintenance Due   Topic Date Due    Hepatitis C Screening  Never done    DTaP/Tdap/Td series (1 - Tdap) Never done    Shingrix Vaccine Age 50> (1 of 2) Never done    COVID-19 Vaccine (4 - Booster for Pfizer series) 06/23/2022    Bone Densitometry (Dexa) Screening  Never done   . Coordination of Care:    1. \"Have you been to the ER, urgent care clinic since your last visit? Hospitalized since your last visit? \" No    2. \"Have you seen or consulted any other health care providers outside of the [address] [place] [address] since your last visit? \" No     3. For patients aged 39-70: Has the patient had a colonoscopy? Yes - no Care Gap present     If the patient is female:    4. For patients aged 41-77: Has the patient had a mammogram within the past 2 years? Yes - no Care Gap present    5. For patients aged 21-65: Has the patient had a pap smear?  Yes - no Care Gap present

## 2022-07-19 NOTE — PROGRESS NOTES
Subjective:   Lory Francisco is a 74 y.o. female who was seen for Urinary Burning    Patient presents today due to worsening lower abdominal pain for last 5 days. Denies any nausea or vomiting. No fevers. She recently had a GI appointment and it is thought that she may have a segmental colitis associated with diverticulosis. She states her pain is worse in her left lower quadrant. Home Medications    Medication Sig Start Date End Date Taking? Authorizing Provider   lidocaine-prilocaine (EMLA) topical cream APPLY SMALL AMOUNT TO ACCESS SITE (AVF) 1 TO 2 HOURS BEFORE DIALYSIS. COVER WITH OCCLUSIVE DRESSING (SARAN WRAP) 6/27/22  Yes Provider, Historical   dicyclomine (BENTYL) 10 mg capsule  5/14/22  Yes Provider, Historical   ciprofloxacin HCl (CIPRO) 500 mg tablet Take 1 Tablet by mouth two (2) times a day for 10 days. 7/19/22 7/29/22 Yes Linda Vargas MD   metroNIDAZOLE (FLAGYL) 250 mg tablet Take 1 Tablet by mouth three (3) times daily for 10 days. 7/19/22 7/29/22 Yes Linda Vargas MD   clopidogreL (Plavix) 75 mg tab Take 1 Tablet by mouth daily. 5/30/22  Yes Linda Vargas MD   zolpidem (AMBIEN) 5 mg tablet Take 1 Tablet by mouth nightly as needed for Sleep. Max Daily Amount: 5 mg. 5/30/22  Yes Linda Vargas MD   montelukast (SINGULAIR) 10 mg tablet Take 1 Tablet by mouth daily. 5/30/22  Yes Linda Vargas MD   sucralfate (CARAFATE) 1 gram tablet TAKE 1 TABLET BY MOUTH FOUR TIMES DAILY 5/19/22  Yes Linda Vargas MD   meclizine (ANTIVERT) 25 mg tablet Take 1 Tablet by mouth three (3) times daily as needed for Dizziness. 4/12/22  Yes Linda Vargas MD   carvediloL (COREG) 25 mg tablet Take 1 Tablet by mouth two (2) times daily (with meals). 4/12/22  Yes Linda Vargas MD   levothyroxine (SYNTHROID) 75 mcg tablet Take 1 Tablet by mouth Daily (before breakfast). 4/12/22  Yes Linda Vargas MD   apixaban (ELIQUIS) 2.5 mg tablet Take 2.5 mg by mouth two (2) times a day.    Yes Provider, Historical   predniSONE (DELTASONE) 5 mg tablet Take 1 Tablet by mouth daily. Yes Provider, Historical   fluticasone-umeclidinium-vilanterol (Trelegy Ellipta) 100-62.5-25 mcg inhaler Take 1 Puff by inhalation daily. 1/20/22  Yes Sabiha Crespo MD   hyoscyamine SL (LEVSIN/SL) 0.125 mg SL tablet 1 Tablet by SubLINGual route every four (4) hours as needed (irritable bowel). 1/17/22  Yes Sabiha Crespo MD   Dexilant 60 mg CpDB capsule (delayed release) TAKE 1 CAPSULE BY MOUTH EVERY DAY 1/13/22  Yes Sabiha Crespo MD   albuterol (PROVENTIL VENTOLIN) 2.5 mg /3 mL (0.083 %) nebu USE 1 VIAL VIA NEBULIZER THREE TIMES DAILY 12/23/21  Yes Sabiha Crespo MD   gabapentin (NEURONTIN) 100 mg capsule 2 capsules TID 12/22/21  Yes Jyoti Handy MD   amiodarone (CORDARONE) 200 mg tablet TAKE 1 TABLET BY MOUTH EVERY DAY 9/18/21  Yes Jeff Hall MD   valGANciclovir (VALCYTE) 450 mg tablet TAKE 2 TABLETS BY MOUTH DAILY 9/18/21  Yes Jeff Hall MD   losartan (COZAAR) 50 mg tablet Take 1 Tablet by mouth daily. 8/24/21  Yes Jeff Hall MD   calcitRIOL (ROCALTROL) 0.25 mcg capsule Take 1 Capsule by mouth as directed. Take on non dialysis days 7/19/21  Yes Marleni, MD Abelardo   RenaPlex-D 800 mcg-12.5 mg -2,000 unit tab Take 1 Tablet by mouth daily. 4/15/21  Yes Abelardo Yepez MD   sevelamer carbonate (RENVELA) 800 mg tab tab Take 800 mg by mouth three (3) times daily. 6/8/21  Yes Abelardo Yepez MD   simvastatin (ZOCOR) 20 mg tablet TAKE 1 TABLET BY MOUTH DAILY AS DIRECTED. 2/19/21  Yes Jeff Hall MD   ESTRACE 0.01 % (0.1 mg/gram) vaginal cream INSERT 2 GRAMS INTO VAGINA EVERY MONDAY AND THURSDAY 4/11/17  Yes Jayme Muñoz MD   cranberry extract 425 mg cap 425 mg. 1/17/14  Yes Provider, Historical   fluticasone (FLONASE) 50 mcg/actuation nasal spray 1 [place].    Yes Provider, Historical   EPINEPHrine (EPIPEN) 0.3 mg/0.3 mL injection 0.3 mg. 10/1/16  Yes Provider, Historical   nystatin (MYCOSTATIN) 100,000 unit/mL suspension Take 5 mL by mouth four (4) times daily. swish and spit  Patient not taking: Reported on 7/19/2022 4/14/22   Cynthia Triplett MD   ondansetron [facility] ODT) 4 mg disintegrating tablet Take 1 Tablet by mouth every eight (8) hours as needed for Nausea or Nausea or Vomiting. Patient not taking: Reported on 7/19/2022 4/12/22   Cynthia Triplett MD   escitalopram oxalate (LEXAPRO) 10 mg tablet Take 1 Tablet by mouth daily. Patient not taking: Reported on 7/19/2022 4/12/22   Cynthia Triplett MD   aluminum & magnesium hydroxide-simethicone (Maalox Maximum Strength) 400-400-40 mg/5 mL suspension Take 10 mL by mouth every six (6) hours as needed for Indigestion. Patient not taking: Reported on 7/19/2022 9/17/20   Link Moss MD      Allergies   Allergen Reactions    Aspirin Rash and Unknown (comments)    Bactrim [Sulfamethoxazole-Trimethoprim] Rash and Unknown (comments)    Bromfenac Rash and Unknown (comments)    Cefepime Other (comments)     Neurological reaction    Ceftriaxone Rash    Copper Rash    Ibuprofen Rash and Unknown (comments)    Ketorolac Tromethamine Rash and Unknown (comments)    Relafen [Nabumetone] Rash and Unknown (comments)    Rifampin Rash and Unknown (comments)    Vancomycin Rash and Unknown (comments)     Pt reports causes itching, takes benadryl prior to use. Pt denies anaphylaxis. Requires benadryl with each vancomycin dose     Social History     Tobacco Use    Smoking status: Never Smoker    Smokeless tobacco: Never Used   Vaping Use    Vaping Use: Never used   Substance Use Topics    Alcohol use: No    Drug use: No        Review of Systems   All other systems reviewed and are negative.       Objective:     Visit Vitals  BP (!) 145/75 (BP 1 Location: Right upper arm, BP Patient Position: Sitting, BP Cuff Size: Adult)   Pulse 65   Temp 98.7 °F (37.1 °C) (Temporal)   Ht 5' 1\" (1.549 m)   Wt 142 lb (64.4 kg)   SpO2 99%   BMI 26.83 kg/m²        General: alert, oriented, not in distress  Chest/Lungs: clear breath sounds, no wheezing or crackles  Heart: normal rate, regular rhythm, no murmur  Abdomen: soft, non-distended, left lower quadrant tenderness, normal bowel sounds, no organomegaly, no masses  Extremities: no focal deformities, no edema  Skin: no active skin lesions      Assessment & Plan:     1. Segmental colitis associated with diverticulosis ([facility] [address]  She does endorse left lower quadrant tenderness which may be associated with her segmental colitis. Will start her on Cipro and Flagyl for 10 days. UA was negative for UTI. Per GI note, she may need surgical evaluation for segmental resection of her left colon. Will make referral to general surgery to evaluate. Also advised her to follow up with GI. I have discussed the diagnosis with the patient and the intended plan as seen in the above orders. The patient has received an after-visit summary and questions were answered concerning future plans. I have discussed medication side effects and warnings with the patient as well. I have reviewed the plan of care with the patient, accepted their input and they are in agreement with the treatment goals. Previous lab and imaging results were reviewed by me.        Rip Stewart MD  July 19, 2022

## 2022-07-27 ENCOUNTER — TELEPHONE (OUTPATIENT)
Dept: FAMILY MEDICINE CLINIC | Age: 67
End: 2022-07-27

## 2022-07-27 DIAGNOSIS — M48.061 SPINAL STENOSIS OF LUMBAR REGION, UNSPECIFIED WHETHER NEUROGENIC CLAUDICATION PRESENT: ICD-10-CM

## 2022-07-27 DIAGNOSIS — M54.16 LUMBAR NEURITIS: ICD-10-CM

## 2022-07-27 DIAGNOSIS — I65.02 STENOSIS OF LEFT VERTEBRAL ARTERY: ICD-10-CM

## 2022-07-27 DIAGNOSIS — Z99.2 ESRD (END STAGE RENAL DISEASE) ON DIALYSIS (HCC): ICD-10-CM

## 2022-07-27 DIAGNOSIS — K21.00 GASTROESOPHAGEAL REFLUX DISEASE WITH ESOPHAGITIS WITHOUT HEMORRHAGE: ICD-10-CM

## 2022-07-27 DIAGNOSIS — N18.6 ESRD (END STAGE RENAL DISEASE) ON DIALYSIS (HCC): ICD-10-CM

## 2022-07-27 DIAGNOSIS — I10 HYPERTENSION, UNSPECIFIED TYPE: ICD-10-CM

## 2022-07-27 DIAGNOSIS — M51.36 DDD (DEGENERATIVE DISC DISEASE), LUMBAR: ICD-10-CM

## 2022-07-27 DIAGNOSIS — M47.819 FACET ARTHROPATHY: ICD-10-CM

## 2022-07-27 RX ORDER — FLUTICASONE FUROATE, UMECLIDINIUM BROMIDE AND VILANTEROL TRIFENATATE 100; 62.5; 25 UG/1; UG/1; UG/1
1 POWDER RESPIRATORY (INHALATION) DAILY
Qty: 60 EACH | Refills: 1 | Status: SHIPPED | OUTPATIENT
Start: 2022-07-27 | End: 2022-10-26 | Stop reason: SDUPTHER

## 2022-07-27 RX ORDER — LOSARTAN POTASSIUM 50 MG/1
50 TABLET ORAL DAILY
Qty: 90 TABLET | Refills: 1 | Status: SHIPPED | OUTPATIENT
Start: 2022-07-27 | End: 2022-08-26 | Stop reason: SDUPTHER

## 2022-07-27 RX ORDER — MONTELUKAST SODIUM 10 MG/1
10 TABLET ORAL DAILY
Qty: 90 TABLET | Refills: 1 | Status: SHIPPED | OUTPATIENT
Start: 2022-07-27

## 2022-07-27 RX ORDER — CLOPIDOGREL BISULFATE 75 MG/1
75 TABLET ORAL DAILY
Qty: 90 TABLET | Refills: 1 | Status: SHIPPED | OUTPATIENT
Start: 2022-07-27 | End: 2022-08-28

## 2022-07-27 RX ORDER — CARVEDILOL 25 MG/1
25 TABLET ORAL 2 TIMES DAILY WITH MEALS
Qty: 180 TABLET | Refills: 1 | Status: SHIPPED | OUTPATIENT
Start: 2022-07-27

## 2022-07-27 RX ORDER — AMIODARONE HYDROCHLORIDE 200 MG/1
200 TABLET ORAL DAILY
Qty: 90 TABLET | Refills: 1 | Status: SHIPPED | OUTPATIENT
Start: 2022-07-27 | End: 2022-08-16

## 2022-07-27 RX ORDER — DEXLANSOPRAZOLE 60 MG/1
1 CAPSULE, DELAYED RELEASE ORAL DAILY
Qty: 90 CAPSULE | Refills: 0 | Status: SHIPPED | OUTPATIENT
Start: 2022-07-27 | End: 2022-10-03

## 2022-07-27 RX ORDER — SUCRALFATE 1 G/1
TABLET ORAL
Qty: 360 TABLET | Refills: 1 | Status: SHIPPED | OUTPATIENT
Start: 2022-07-27

## 2022-07-27 RX ORDER — FLUTICASONE PROPIONATE 50 MCG
1 SPRAY, SUSPENSION (ML) NASAL DAILY
Qty: 1 EACH | Refills: 1 | Status: SHIPPED | OUTPATIENT
Start: 2022-07-27 | End: 2022-10-05

## 2022-07-27 RX ORDER — SIMVASTATIN 20 MG/1
TABLET, FILM COATED ORAL
Qty: 90 TABLET | Refills: 1 | Status: SHIPPED | OUTPATIENT
Start: 2022-07-27

## 2022-07-27 RX ORDER — HYOSCYAMINE SULFATE 0.12 MG/1
0.12 TABLET SUBLINGUAL
Qty: 30 TABLET | Refills: 2 | Status: SHIPPED | OUTPATIENT
Start: 2022-07-27

## 2022-07-27 RX ORDER — LEVOTHYROXINE SODIUM 75 UG/1
75 TABLET ORAL
Qty: 90 TABLET | Refills: 1 | Status: SHIPPED | OUTPATIENT
Start: 2022-07-27

## 2022-07-27 RX ORDER — EPINEPHRINE 0.3 MG/.3ML
0.3 INJECTION SUBCUTANEOUS
Qty: 1 EACH | Refills: 0 | Status: SHIPPED | OUTPATIENT
Start: 2022-07-27 | End: 2022-07-27

## 2022-07-27 RX ORDER — DICYCLOMINE HYDROCHLORIDE 10 MG/1
10 CAPSULE ORAL DAILY
Qty: 90 CAPSULE | Refills: 1 | Status: SHIPPED | OUTPATIENT
Start: 2022-07-27

## 2022-07-27 RX ORDER — VALGANCICLOVIR 450 MG/1
900 TABLET, FILM COATED ORAL DAILY
Qty: 180 TABLET | Refills: 1 | Status: SHIPPED | OUTPATIENT
Start: 2022-07-27

## 2022-07-27 RX ORDER — MECLIZINE HYDROCHLORIDE 25 MG/1
25 TABLET ORAL
Qty: 90 TABLET | Refills: 1 | Status: SHIPPED | OUTPATIENT
Start: 2022-07-27 | End: 2022-09-20

## 2022-07-27 NOTE — TELEPHONE ENCOUNTER
Patient stated she needs these refilled did not see them on her list.        Amlodipine 10 mg 1 a day, Famotide 20 mg 1 a day.       Patient needs all meds refilled and sent to [facility]

## 2022-07-27 NOTE — TELEPHONE ENCOUNTER
----- Message from [address] sent at 7/27/2022  9:50 AM EDT -----  Subject: Refill Request    QUESTIONS  Name of Medication? Other - apixaban (Eliquis)   Patient-reported dosage and instructions? 2.5 mg 2X daily  How many days do you have left? 7  Preferred Pharmacy? [facility] ([address])  Pharmacy phone number (if available)? [phone]  ---------------------------------------------------------------------------  --------------,  Name of Medication? Other - Sertraline 50 mg  Patient-reported dosage and instructions? 50 mg 1 X daily  How many days do you have left? 7  Preferred Pharmacy? [facility] ([address])  Pharmacy phone number (if available)? [phone]  ---------------------------------------------------------------------------  --------------  CALL BACK INFO  What is the best way for the office to contact you? OK to leave message on   voicemail  Preferred Call Back Phone Number? [phone]  ---------------------------------------------------------------------------  --------------  SCRIPT ANSWERS  Relationship to Patient?  Self

## 2022-07-27 NOTE — TELEPHONE ENCOUNTER
----- Message from [address] sent at 7/27/2022  9:50 AM EDT -----  Subject: Refill Request    QUESTIONS  Name of Medication? Other - apixaban (Eliquis)   Patient-reported dosage and instructions? 2.5 mg 2X daily  How many days do you have left? 7  Preferred Pharmacy? [facility] ([address])  Pharmacy phone number (if available)? [phone]  ---------------------------------------------------------------------------  --------------,  Name of Medication? Other - Sertraline 50 mg  Patient-reported dosage and instructions? 50 mg 1 X daily  How many days do you have left? 7  Preferred Pharmacy? [facility] ([address])  Pharmacy phone number (if available)? [phone]  ---------------------------------------------------------------------------  --------------  CALL BACK INFO  What is the best way for the office to contact you? OK to leave message on   voicemail  Preferred Call Back Phone Number? [phone]  ---------------------------------------------------------------------------  --------------  SCRIPT ANSWERS  Relationship to Patient?  Self

## 2022-07-28 RX ORDER — PREDNISONE 5 MG/1
5 TABLET ORAL DAILY
Qty: 30 TABLET | Refills: 0 | OUTPATIENT
Start: 2022-07-28

## 2022-07-28 RX ORDER — GABAPENTIN 100 MG/1
CAPSULE ORAL
Qty: 540 CAPSULE | Refills: 1 | OUTPATIENT
Start: 2022-07-28

## 2022-08-01 ENCOUNTER — OFFICE VISIT (OUTPATIENT)
Dept: FAMILY MEDICINE CLINIC | Age: 67
End: 2022-08-01
Payer: MEDICARE

## 2022-08-01 VITALS
HEIGHT: 61 IN | OXYGEN SATURATION: 99 % | SYSTOLIC BLOOD PRESSURE: 150 MMHG | TEMPERATURE: 97.5 F | BODY MASS INDEX: 27.62 KG/M2 | DIASTOLIC BLOOD PRESSURE: 74 MMHG | WEIGHT: 146.3 LBS | HEART RATE: 73 BPM

## 2022-08-01 DIAGNOSIS — M25.511 ACUTE PAIN OF BOTH SHOULDERS: ICD-10-CM

## 2022-08-01 DIAGNOSIS — M54.2 NECK PAIN: Primary | ICD-10-CM

## 2022-08-01 DIAGNOSIS — M25.512 ACUTE PAIN OF BOTH SHOULDERS: ICD-10-CM

## 2022-08-01 PROCEDURE — 1101F PT FALLS ASSESS-DOCD LE1/YR: CPT | Performed by: STUDENT IN AN ORGANIZED HEALTH CARE EDUCATION/TRAINING PROGRAM

## 2022-08-01 PROCEDURE — G8400 PT W/DXA NO RESULTS DOC: HCPCS | Performed by: STUDENT IN AN ORGANIZED HEALTH CARE EDUCATION/TRAINING PROGRAM

## 2022-08-01 PROCEDURE — 99213 OFFICE O/P EST LOW 20 MIN: CPT | Performed by: STUDENT IN AN ORGANIZED HEALTH CARE EDUCATION/TRAINING PROGRAM

## 2022-08-01 PROCEDURE — G9899 SCRN MAM PERF RSLTS DOC: HCPCS | Performed by: STUDENT IN AN ORGANIZED HEALTH CARE EDUCATION/TRAINING PROGRAM

## 2022-08-01 PROCEDURE — G8417 CALC BMI ABV UP PARAM F/U: HCPCS | Performed by: STUDENT IN AN ORGANIZED HEALTH CARE EDUCATION/TRAINING PROGRAM

## 2022-08-01 PROCEDURE — 1090F PRES/ABSN URINE INCON ASSESS: CPT | Performed by: STUDENT IN AN ORGANIZED HEALTH CARE EDUCATION/TRAINING PROGRAM

## 2022-08-01 PROCEDURE — 3017F COLORECTAL CA SCREEN DOC REV: CPT | Performed by: STUDENT IN AN ORGANIZED HEALTH CARE EDUCATION/TRAINING PROGRAM

## 2022-08-01 PROCEDURE — G8536 NO DOC ELDER MAL SCRN: HCPCS | Performed by: STUDENT IN AN ORGANIZED HEALTH CARE EDUCATION/TRAINING PROGRAM

## 2022-08-01 PROCEDURE — G8428 CUR MEDS NOT DOCUMENT: HCPCS | Performed by: STUDENT IN AN ORGANIZED HEALTH CARE EDUCATION/TRAINING PROGRAM

## 2022-08-01 PROCEDURE — G8510 SCR DEP NEG, NO PLAN REQD: HCPCS | Performed by: STUDENT IN AN ORGANIZED HEALTH CARE EDUCATION/TRAINING PROGRAM

## 2022-08-01 PROCEDURE — 1123F ACP DISCUSS/DSCN MKR DOCD: CPT | Performed by: STUDENT IN AN ORGANIZED HEALTH CARE EDUCATION/TRAINING PROGRAM

## 2022-08-01 PROCEDURE — G9231 DOC ESRD DIA TRANS PREG: HCPCS | Performed by: STUDENT IN AN ORGANIZED HEALTH CARE EDUCATION/TRAINING PROGRAM

## 2022-08-01 RX ORDER — CYCLOBENZAPRINE HCL 5 MG
5 TABLET ORAL
Qty: 90 TABLET | Refills: 0 | Status: SHIPPED | OUTPATIENT
Start: 2022-08-01 | End: 2022-08-18
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
## 2022-08-03 ENCOUNTER — TELEPHONE (OUTPATIENT)
Dept: FAMILY MEDICINE CLINIC | Age: 67
End: 2022-08-03

## 2022-08-09 DIAGNOSIS — I10 HYPERTENSION, UNSPECIFIED TYPE: Primary | ICD-10-CM

## 2022-08-09 RX ORDER — PREDNISONE 5 MG/1
TABLET ORAL
Qty: 90 TABLET | Refills: 1 | OUTPATIENT
Start: 2022-08-09

## 2022-08-09 RX ORDER — AMLODIPINE BESYLATE 10 MG/1
TABLET ORAL
Qty: 90 TABLET | Refills: 1 | Status: SHIPPED | OUTPATIENT
Start: 2022-08-09

## 2022-08-09 NOTE — TELEPHONE ENCOUNTER
Prednisone is listed as historical and was last given by another provider. Please sign if appropriate. Last Visit: 8/1/22 with MD Manule Mora  Next Appointment: 8/18/22 with MD Manuel Mora  Previous Refill Encounter(s): 12/20/21 Norvasc #30 with 1 refill    Requested Prescriptions     Pending Prescriptions Disp Refills    amLODIPine (NORVASC) 10 mg tablet 90 Tablet 1     Sig: Take 1 tablet by mouth once daily    predniSONE (DELTASONE) 5 mg tablet 90 Tablet 1     Sig: Take 1 tablet by mouth once daily         For Pharmacy [address] [place] [address] in place:   Recommendation Provided To:    Intervention Detail: New Rx: 2, reason: Patient Preference  Gap Closed?:   Intervention Accepted By:   Time Spent (min): 5

## 2022-08-16 RX ORDER — AMIODARONE HYDROCHLORIDE 200 MG/1
TABLET ORAL
Qty: 90 TABLET | Refills: 1 | Status: SHIPPED | OUTPATIENT
Start: 2022-08-16

## 2022-08-17 ENCOUNTER — HOSPITAL ENCOUNTER (OUTPATIENT)
Dept: PHYSICAL THERAPY | Age: 67
Discharge: HOME | End: 2022-08-17
Payer: MEDICARE

## 2022-08-17 PROCEDURE — 97163 PT EVAL HIGH COMPLEX 45 MIN: CPT
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
## 2022-08-18 ENCOUNTER — HOSPITAL ENCOUNTER (OUTPATIENT)
Dept: LAB | Age: 67
Discharge: HOME | End: 2022-08-18

## 2022-08-18 ENCOUNTER — OFFICE VISIT (OUTPATIENT)
Dept: FAMILY MEDICINE CLINIC | Age: 67
End: 2022-08-18
Payer: MEDICARE

## 2022-08-18 VITALS
BODY MASS INDEX: 27.66 KG/M2 | DIASTOLIC BLOOD PRESSURE: 69 MMHG | HEART RATE: 69 BPM | SYSTOLIC BLOOD PRESSURE: 164 MMHG | WEIGHT: 146.4 LBS

## 2022-08-18 DIAGNOSIS — I48.91 ATRIAL FIBRILLATION, UNSPECIFIED TYPE (HCC): ICD-10-CM

## 2022-08-18 DIAGNOSIS — K50.10 SEGMENTAL COLITIS ASSOCIATED WITH DIVERTICULOSIS (HCC): ICD-10-CM

## 2022-08-18 DIAGNOSIS — K57.30 SEGMENTAL COLITIS ASSOCIATED WITH DIVERTICULOSIS (HCC): ICD-10-CM

## 2022-08-18 DIAGNOSIS — E03.9 HYPOTHYROIDISM, UNSPECIFIED TYPE: Primary | ICD-10-CM

## 2022-08-18 DIAGNOSIS — M54.2 NECK PAIN: ICD-10-CM

## 2022-08-18 PROCEDURE — 1090F PRES/ABSN URINE INCON ASSESS: CPT | Performed by: STUDENT IN AN ORGANIZED HEALTH CARE EDUCATION/TRAINING PROGRAM

## 2022-08-18 PROCEDURE — 99001 SPECIMEN HANDLING PT-LAB: CPT

## 2022-08-18 PROCEDURE — G9231 DOC ESRD DIA TRANS PREG: HCPCS | Performed by: STUDENT IN AN ORGANIZED HEALTH CARE EDUCATION/TRAINING PROGRAM

## 2022-08-18 PROCEDURE — G8427 DOCREV CUR MEDS BY ELIG CLIN: HCPCS | Performed by: STUDENT IN AN ORGANIZED HEALTH CARE EDUCATION/TRAINING PROGRAM

## 2022-08-18 PROCEDURE — 99214 OFFICE O/P EST MOD 30 MIN: CPT | Performed by: STUDENT IN AN ORGANIZED HEALTH CARE EDUCATION/TRAINING PROGRAM

## 2022-08-18 PROCEDURE — 1123F ACP DISCUSS/DSCN MKR DOCD: CPT | Performed by: STUDENT IN AN ORGANIZED HEALTH CARE EDUCATION/TRAINING PROGRAM

## 2022-08-18 PROCEDURE — G8510 SCR DEP NEG, NO PLAN REQD: HCPCS | Performed by: STUDENT IN AN ORGANIZED HEALTH CARE EDUCATION/TRAINING PROGRAM

## 2022-08-18 PROCEDURE — 1101F PT FALLS ASSESS-DOCD LE1/YR: CPT | Performed by: STUDENT IN AN ORGANIZED HEALTH CARE EDUCATION/TRAINING PROGRAM

## 2022-08-18 PROCEDURE — G8400 PT W/DXA NO RESULTS DOC: HCPCS | Performed by: STUDENT IN AN ORGANIZED HEALTH CARE EDUCATION/TRAINING PROGRAM

## 2022-08-18 PROCEDURE — G8417 CALC BMI ABV UP PARAM F/U: HCPCS | Performed by: STUDENT IN AN ORGANIZED HEALTH CARE EDUCATION/TRAINING PROGRAM

## 2022-08-18 PROCEDURE — G9899 SCRN MAM PERF RSLTS DOC: HCPCS | Performed by: STUDENT IN AN ORGANIZED HEALTH CARE EDUCATION/TRAINING PROGRAM

## 2022-08-18 PROCEDURE — 3017F COLORECTAL CA SCREEN DOC REV: CPT | Performed by: STUDENT IN AN ORGANIZED HEALTH CARE EDUCATION/TRAINING PROGRAM

## 2022-08-18 PROCEDURE — G8536 NO DOC ELDER MAL SCRN: HCPCS | Performed by: STUDENT IN AN ORGANIZED HEALTH CARE EDUCATION/TRAINING PROGRAM

## 2022-08-18 RX ORDER — CYCLOBENZAPRINE HCL 5 MG
5 TABLET ORAL
Qty: 180 TABLET | Refills: 0 | Status: SHIPPED | OUTPATIENT
Start: 2022-08-18 | End: 2022-11-03

## 2022-08-18 NOTE — PROGRESS NOTES
Guillermina Fair presents today for   Chief Complaint   Patient presents with    Follow-up     3 mo f/up. Patient mentioned she is just in pain all the time        Is someone accompanying this pt? no    Is the patient using any DME equipment during [address]? no    Depression Screening:  3 most recent PHQ Screens 8/18/2022   Little interest or pleasure in doing things Several days   Feeling down, depressed, irritable, or hopeless Several days   Total Score PHQ 2 2   Trouble falling or staying asleep, or sleeping too much Several days   Feeling tired or having little energy Several days   Poor appetite, weight loss, or overeating Several days   Feeling bad about yourself - or that you are a failure or have let yourself or your family down Not at all   Trouble concentrating on things such as school, work, reading, or watching TV Not at all   Moving or speaking so slowly that other people could have noticed; or the opposite being so fidgety that others notice Not at all   Thoughts of being better off dead, or hurting yourself in some way Not at all   PHQ 9 Score 5       Learning Assessment:  Learning Assessment 8/17/2021   PRIMARY LEARNER Patient   HIGHEST LEVEL OF EDUCATION - PRIMARY LEARNER  SOME COLLEGE   BARRIERS PRIMARY LEARNER NONE   PRIMARY LANGUAGE ENGLISH   LEARNER PREFERENCE PRIMARY DEMONSTRATION   LEARNING SPECIAL TOPICS NO   ANSWERED BY SELF   RELATIONSHIP SELF       Fall Risk  Fall Risk Assessment, last 12 mths 8/18/2022   Able to walk? Yes   Fall in past 12 months? 0   Do you feel unsteady? 0   Are you worried about falling 0   Number of falls in past 12 months -   Fall with injury? -       Coordination of Care:  1. Have you been to the ER, urgent care clinic since your last visit? Hospitalized since your last visit? no    2. Have you seen or consulted any other health care providers outside of the [address] [place] [address] since your last visit? Include any pap smears or colon screening.  Yes, pcp serge

## 2022-08-19 LAB
ALBUMIN SERPL-MCNC: 4.4 G/DL (ref 3.8–4.8)
ALBUMIN/GLOB SERPL: 2.1 {RATIO} (ref 1.2–2.2)
ALP SERPL-CCNC: 76 IU/L (ref 44–121)
ALT SERPL-CCNC: 17 IU/L (ref 0–32)
AST SERPL-CCNC: 20 IU/L (ref 0–40)
BASOPHILS # BLD AUTO: 0.1 X10E3/UL (ref 0–0.2)
BASOPHILS NFR BLD AUTO: 1 %
BILIRUB SERPL-MCNC: 0.3 MG/DL (ref 0–1.2)
BUN SERPL-MCNC: 56 MG/DL (ref 8–27)
BUN/CREAT SERPL: 7 (ref 12–28)
CALCIUM SERPL-MCNC: 8.7 MG/DL (ref 8.7–10.3)
CHLORIDE SERPL-SCNC: 100 MMOL/L (ref 96–106)
CO2 SERPL-SCNC: 20 MMOL/L (ref 20–29)
CREAT SERPL-MCNC: 8.42 MG/DL (ref 0.57–1)
EGFR: 5 ML/MIN/1.73
EOSINOPHIL # BLD AUTO: 0.1 X10E3/UL (ref 0–0.4)
EOSINOPHIL NFR BLD AUTO: 1 %
ERYTHROCYTE [DISTWIDTH] IN BLOOD BY AUTOMATED COUNT: 17.9 % (ref 11.7–15.4)
GLOBULIN SER CALC-MCNC: 2.1 G/DL (ref 1.5–4.5)
GLUCOSE SERPL-MCNC: 82 MG/DL (ref 65–99)
HCT VFR BLD AUTO: 33.5 % (ref 34–46.6)
HGB BLD-MCNC: 11.2 G/DL (ref 11.1–15.9)
IMM GRANULOCYTES # BLD AUTO: 0 X10E3/UL (ref 0–0.1)
IMM GRANULOCYTES NFR BLD AUTO: 0 %
LYMPHOCYTES # BLD AUTO: 2.2 X10E3/UL (ref 0.7–3.1)
LYMPHOCYTES NFR BLD AUTO: 34 %
MAGNESIUM SERPL-MCNC: 2.2 MG/DL (ref 1.6–2.3)
MCH RBC QN AUTO: 31.6 PG (ref 26.6–33)
MCHC RBC AUTO-ENTMCNC: 33.4 G/DL (ref 31.5–35.7)
MCV RBC AUTO: 95 FL (ref 79–97)
MONOCYTES # BLD AUTO: 0.4 X10E3/UL (ref 0.1–0.9)
MONOCYTES NFR BLD AUTO: 7 %
NEUTROPHILS # BLD AUTO: 3.8 X10E3/UL (ref 1.4–7)
NEUTROPHILS NFR BLD AUTO: 57 %
PHOSPHATE SERPL-MCNC: 4.1 MG/DL (ref 3–4.3)
PLATELET # BLD AUTO: 179 X10E3/UL (ref 150–450)
POTASSIUM SERPL-SCNC: 4.5 MMOL/L (ref 3.5–5.2)
PROT SERPL-MCNC: 6.5 G/DL (ref 6–8.5)
RBC # BLD AUTO: 3.54 X10E6/UL (ref 3.77–5.28)
SODIUM SERPL-SCNC: 138 MMOL/L (ref 134–144)
TSH SERPL DL<=0.005 MIU/L-ACNC: 10.5 UIU/ML (ref 0.45–4.5)
WBC # BLD AUTO: 6.6 X10E3/UL (ref 3.4–10.8)

## 2022-08-19 NOTE — PROGRESS NOTES
Subjective:   Reid Sparks is a 74 y.o. female who was seen for Follow-up (3 mo f/up. Patient mentioned she is just in pain all the time )    Patient still complains of neck pain. Currently in physical therapy. Has been having some intermittent palpitations as well. Denies any chest pain or shortness of breath. Home Medications    Medication Sig Start Date End Date Taking? Authorizing Provider   cyclobenzaprine (FLEXERIL) 5 mg tablet Take 1 Tablet by mouth three (3) times daily as needed for Muscle Spasm(s). 8/18/22  Yes Diego Barreto MD   amiodarone (CORDARONE) 200 mg tablet TAKE 1 TABLET BY MOUTH EVERY DAY 8/16/22  Yes Diego Barreto MD   amLODIPine [facility]) 10 mg tablet Take 1 tablet by mouth once daily 8/9/22  Yes Diego Barreto MD   carvediloL (COREG) 25 mg tablet Take 1 Tablet by mouth two (2) times daily (with meals). 7/27/22  Yes Diego Barreto MD   clopidogreL (Plavix) 75 mg tab Take 1 Tablet by mouth in the morning. 7/27/22  Yes Diego Barreto MD   meclizine (ANTIVERT) 25 mg tablet Take 1 Tablet by mouth three (3) times daily as needed for Dizziness. 7/27/22  Yes Diego Barreto MD   dexlansoprazole (Dexilant) 60 mg CpDB capsule (delayed release) Take 1 Capsule by mouth in the morning. 7/27/22  Yes Diego Barreto MD   fluticasone propionate [facility]) 50 mcg/actuation nasal spray Take 1 [place] by Both Nostrils route in the morning. 7/27/22  Yes Diego Barreto MD   levothyroxine (SYNTHROID) 75 mcg tablet Take 1 Tablet by mouth Daily (before breakfast).  7/27/22  Yes Diego Barreto MD   simvastatin (ZOCOR) 20 mg tablet TAKE 1 TABLET BY MOUTH DAILY AS DIRECTED. 7/27/22  Yes Diego Barreto MD   fluticasone-umeclidinium-vilanterol (Trelegy Ellipta) 100-62.5-25 mcg inhaler Take 1 Puff by inhalation in the morning. 7/27/22  Yes Diego Barreto MD   valGANciclovir (VALCYTE) 450 mg tablet Take 2 Tablets by mouth in the morning. 7/27/22  Yes Diego Barreto MD   dicyclomine (BENTYL) 10 mg capsule Take 1 Capsule by mouth in the morning. 7/27/22  Yes Avani Gambino MD   montelukast (SINGULAIR) 10 mg tablet Take 1 Tablet by mouth in the morning. 7/27/22  Yes Avani Gambino MD   losartan (COZAAR) 50 mg tablet Take 1 Tablet by mouth in the morning. 7/27/22  Yes Avani Gambino MD   hyoscyamine SL (LEVSIN/SL) 0.125 mg SL tablet 1 Tablet by SubLINGual route every four (4) hours as needed (irritable bowel). 7/27/22  Yes Avani Gambino MD   sucralfate (CARAFATE) 1 gram tablet TAKE 1 TABLET BY MOUTH FOUR TIMES DAILY 7/27/22  Yes Avani Gambino MD   apixaban [facility]) 2.5 mg tablet Take 1 Tablet by mouth two (2) times a day. 7/27/22  Yes Avani Gambino MD   lidocaine-prilocaine (EMLA) topical cream APPLY SMALL AMOUNT TO ACCESS SITE (AVF) 1 TO 2 HOURS BEFORE DIALYSIS. COVER WITH OCCLUSIVE DRESSING (SARAN WRAP) 6/27/22  Yes Provider, Historical   zolpidem (AMBIEN) 5 mg tablet Take 1 Tablet by mouth nightly as needed for Sleep. Max Daily Amount: 5 mg. 5/30/22  Yes Avani Gambino MD   nystatin (MYCOSTATIN) 100,000 unit/mL suspension Take 5 mL by mouth four (4) times daily. swish and spit 4/14/22  Yes Avani Gambino MD   ondansetron [facility] ODT) 4 mg disintegrating tablet Take 1 Tablet by mouth every eight (8) hours as needed for Nausea or Nausea or Vomiting. 4/12/22  Yes Avani Gambino MD   escitalopram oxalate (LEXAPRO) 10 mg tablet Take 1 Tablet by mouth daily. 4/12/22  Yes Avani Gambino MD   predniSONE (DELTASONE) 5 mg tablet Take 1 Tablet by mouth daily. Yes Provider, Historical   albuterol (PROVENTIL VENTOLIN) 2.5 mg /3 mL (0.083 %) nebu USE 1 VIAL VIA NEBULIZER THREE TIMES DAILY 12/23/21  Yes vAani Gambino MD   gabapentin (NEURONTIN) 100 mg capsule 2 capsules TID 12/22/21  Yes Norquiana Haq MD   calcitRIOL (ROCALTROL) 0.25 mcg capsule Take 1 Capsule by mouth as directed. Take on non dialysis days 7/19/21  Yes Other, MD Abelardo   RenaPlex-D 800 mcg-12.5 mg -2,000 unit tab Take 1 Tablet by mouth daily.  4/15/21  Yes Other, MD Abelardo   sevelamer carbonate (RENVELA) 800 mg tab tab Take 800 mg by mouth three (3) times daily. 6/8/21  Yes Other, MD Abelardo   aluminum & magnesium hydroxide-simethicone (Maalox Maximum Strength) 400-400-40 mg/5 mL suspension Take 10 mL by mouth every six (6) hours as needed for Indigestion. 9/17/20  Yes Clarissa Kwon MD   ESTRACE 0.01 % (0.1 mg/gram) vaginal cream INSERT 2 GRAMS INTO VAGINA EVERY MONDAY AND THURSDAY 4/11/17  Yes Mariann Miranda MD   cranberry extract 425 mg cap 425 mg. 1/17/14  Yes Provider, Historical      Allergies   Allergen Reactions    Aspirin Rash and Unknown (comments)    Bactrim [Sulfamethoxazole-Trimethoprim] Rash and Unknown (comments)    Bromfenac Rash and Unknown (comments)    Cefepime Other (comments)     Neurological reaction    Ceftriaxone Rash    Copper Rash    Ibuprofen Rash and Unknown (comments)    Ketorolac Tromethamine Rash and Unknown (comments)    Relafen [Nabumetone] Rash and Unknown (comments)    Rifampin Rash and Unknown (comments)    Vancomycin Rash and Unknown (comments)     Pt reports causes itching, takes benadryl prior to use. Pt denies anaphylaxis. Requires benadryl with each vancomycin dose     Social History     Tobacco Use    Smoking status: Never    Smokeless tobacco: Never   Vaping Use    Vaping Use: Never used   Substance Use Topics    Alcohol use: No    Drug use: No        Review of Systems   All other systems reviewed and are negative. Objective:   Visit Vitals  BP (!) 164/69 (BP 1 Location: Right arm, BP Patient Position: Sitting)   Pulse 69   Wt 146 lb 6.4 oz (66.4 kg)   BMI 27.66 kg/m²        General: alert, oriented, not in distress  Chest/Lungs: clear breath sounds, no wheezing or crackles  Heart: normal rate, regular rhythm, no murmur  Abdomen: soft, non-distended, non-tender, normal bowel sounds, no organomegaly, no masses  Extremities: no focal deformities, no edema  Skin: no active skin lesions      Assessment & Plan:     1.  Hypothyroidism, unspecified type  Continue Synthroid. Check TSH    2. Atrial fibrillation, unspecified type ([address]  Rate controlled on amiodarone, Coreg. We will check thyroid panel and LFTs. Continue Eliquis  - TSH 3RD GENERATION  - CBC WITH AUTOMATED DIFF  - METABOLIC PANEL, COMPREHENSIVE  - MAGNESIUM  - PHOSPHORUS    3. Neck pain  Continue Physical therapy    4. Segmental colitis associated with diverticulosis ([address]  Will follow with general surgeon this week             I have discussed the diagnosis with the patient and the intended plan as seen in the above orders. The patient has received an after-visit summary and questions were answered concerning future plans. I have discussed medication side effects and warnings with the patient as well. I have reviewed the plan of care with the patient, accepted their input and they are in agreement with the treatment goals. Previous lab and imaging results were reviewed by me.        Jasmin Hand MD  August 19, 2022

## 2022-08-23 ENCOUNTER — APPOINTMENT (OUTPATIENT)
Dept: PHYSICAL THERAPY | Age: 67
End: 2022-08-23
Payer: MEDICARE

## 2022-08-26 DIAGNOSIS — I10 HYPERTENSION, UNSPECIFIED TYPE: ICD-10-CM

## 2022-08-26 RX ORDER — LOSARTAN POTASSIUM 50 MG/1
50 TABLET ORAL DAILY
Qty: 90 TABLET | Refills: 1 | Status: SHIPPED | OUTPATIENT
Start: 2022-08-26

## 2022-08-26 NOTE — TELEPHONE ENCOUNTER
Last visit:not scheduled  Next visit:11/08/22  Previous refill 7/27/22(90+1R)    Requested Prescriptions     Pending Prescriptions Disp Refills    losartan (COZAAR) 50 mg tablet 90 Tablet 1     Sig: Take 1 Tablet by mouth daily. For [facility] in place:   Recommendation Provided To:    Intervention Detail: New Rx: 1, reason: Patient Preference  Gap Closed?:   Intervention Accepted By:   Time Spent (min): 5

## 2022-08-28 ENCOUNTER — APPOINTMENT (OUTPATIENT)
Dept: GENERAL RADIOLOGY | Age: 67
DRG: 438 | End: 2022-08-28
Attending: EMERGENCY MEDICINE
Payer: MEDICARE

## 2022-08-28 ENCOUNTER — HOSPITAL ENCOUNTER (INPATIENT)
Age: 67
LOS: 3 days | Discharge: HOME | DRG: 438 | End: 2022-08-31
Attending: EMERGENCY MEDICINE | Admitting: STUDENT IN AN ORGANIZED HEALTH CARE EDUCATION/TRAINING PROGRAM
Payer: MEDICARE

## 2022-08-28 ENCOUNTER — APPOINTMENT (OUTPATIENT)
Dept: CT IMAGING | Age: 67
DRG: 438 | End: 2022-08-28
Attending: EMERGENCY MEDICINE
Payer: MEDICARE

## 2022-08-28 DIAGNOSIS — K57.92 DIVERTICULITIS: ICD-10-CM

## 2022-08-28 DIAGNOSIS — S22.31XA CLOSED FRACTURE OF ONE RIB OF RIGHT SIDE, INITIAL ENCOUNTER: ICD-10-CM

## 2022-08-28 DIAGNOSIS — K85.90 ACUTE PANCREATITIS WITHOUT INFECTION OR NECROSIS, UNSPECIFIED PANCREATITIS TYPE: Primary | ICD-10-CM

## 2022-08-28 LAB
ALBUMIN SERPL-MCNC: 3.1 G/DL (ref 3.4–5)
ALBUMIN/GLOB SERPL: 0.9 {RATIO} (ref 0.8–1.7)
ALP SERPL-CCNC: 87 U/L (ref 45–117)
ALT SERPL-CCNC: 34 U/L (ref 13–56)
ANION GAP SERPL CALC-SCNC: 8 MMOL/L (ref 3–18)
APPEARANCE UR: CLEAR
AST SERPL W P-5'-P-CCNC: 38 U/L (ref 10–38)
BACTERIA URNS QL MICRO: ABNORMAL /HPF
BASOPHILS # BLD: 0.1 K/UL (ref 0–0.1)
BASOPHILS NFR BLD: 1 % (ref 0–2)
BILIRUB SERPL-MCNC: 0.4 MG/DL (ref 0.2–1)
BILIRUB UR QL: NEGATIVE
BUN SERPL-MCNC: 28 MG/DL (ref 7–18)
BUN/CREAT SERPL: 4 (ref 12–20)
CA-I BLD-MCNC: 8.2 MG/DL (ref 8.5–10.1)
CHLORIDE SERPL-SCNC: 99 MMOL/L (ref 100–111)
CO2 SERPL-SCNC: 29 MMOL/L (ref 21–32)
COLOR UR: YELLOW
CREAT SERPL-MCNC: 7.37 MG/DL (ref 0.6–1.3)
DIFFERENTIAL METHOD BLD: ABNORMAL
EOSINOPHIL # BLD: 0.1 K/UL (ref 0–0.4)
EOSINOPHIL NFR BLD: 1 % (ref 0–5)
EPITH CASTS URNS QL MICRO: ABNORMAL /LPF (ref 0–20)
ERYTHROCYTE [DISTWIDTH] IN BLOOD BY AUTOMATED COUNT: 16.9 % (ref 11.6–14.5)
GLOBULIN SER CALC-MCNC: 3.3 G/DL (ref 2–4)
GLUCOSE SERPL-MCNC: 72 MG/DL (ref 74–99)
GLUCOSE UR STRIP.AUTO-MCNC: NEGATIVE MG/DL
HCT VFR BLD AUTO: 36.4 % (ref 35–45)
HGB BLD-MCNC: 11.6 G/DL (ref 12–16)
HGB UR QL STRIP: NEGATIVE
IMM GRANULOCYTES # BLD AUTO: 0 K/UL (ref 0–0.04)
IMM GRANULOCYTES NFR BLD AUTO: 0 % (ref 0–0.5)
KETONES UR QL STRIP.AUTO: NEGATIVE MG/DL
LEUKOCYTE ESTERASE UR QL STRIP.AUTO: ABNORMAL
LIPASE SERPL-CCNC: 605 U/L (ref 73–393)
LYMPHOCYTES # BLD: 1.7 K/UL (ref 0.9–3.6)
LYMPHOCYTES NFR BLD: 24 % (ref 21–52)
MCH RBC QN AUTO: 32 PG (ref 24–34)
MCHC RBC AUTO-ENTMCNC: 31.9 G/DL (ref 31–37)
MCV RBC AUTO: 100.3 FL (ref 78–100)
MONOCYTES # BLD: 0.6 K/UL (ref 0.05–1.2)
MONOCYTES NFR BLD: 8 % (ref 3–10)
NEUTS SEG # BLD: 4.7 K/UL (ref 1.8–8)
NEUTS SEG NFR BLD: 66 % (ref 40–73)
NITRITE UR QL STRIP.AUTO: NEGATIVE
NRBC # BLD: 0 K/UL (ref 0–0.01)
NRBC BLD-RTO: 0 PER 100 WBC
PH UR STRIP: 8.5 [PH] (ref 5–8)
PHOSPHATE SERPL-MCNC: 4.3 MG/DL (ref 2.5–4.9)
PLATELET # BLD AUTO: 193 K/UL (ref 135–420)
PMV BLD AUTO: 9.7 FL (ref 9.2–11.8)
POTASSIUM SERPL-SCNC: 4 MMOL/L (ref 3.5–5.5)
PROT SERPL-MCNC: 6.4 G/DL (ref 6.4–8.2)
PROT UR STRIP-MCNC: 300 MG/DL
RBC # BLD AUTO: 3.63 M/UL (ref 4.2–5.3)
RBC #/AREA URNS HPF: ABNORMAL /HPF (ref 0–2)
SODIUM SERPL-SCNC: 136 MMOL/L (ref 136–145)
SP GR UR REFRACTOMETRY: 1.01 (ref 1–1.03)
TROPONIN-HIGH SENSITIVITY: 12 NG/L (ref 0–54)
UROBILINOGEN UR QL STRIP.AUTO: 0.2 EU/DL (ref 0.2–1)
WBC # BLD AUTO: 7.1 K/UL (ref 4.6–13.2)
WBC CASTS URNS QL MICRO: PRESENT
WBC URNS QL MICRO: ABNORMAL /HPF (ref 0–4)

## 2022-08-28 PROCEDURE — 96374 THER/PROPH/DIAG INJ IV PUSH: CPT

## 2022-08-28 PROCEDURE — 81001 URINALYSIS AUTO W/SCOPE: CPT

## 2022-08-28 PROCEDURE — 84100 ASSAY OF PHOSPHORUS: CPT

## 2022-08-28 PROCEDURE — 84484 ASSAY OF TROPONIN QUANT: CPT

## 2022-08-28 PROCEDURE — 36415 COLL VENOUS BLD VENIPUNCTURE: CPT

## 2022-08-28 PROCEDURE — 96375 TX/PRO/DX INJ NEW DRUG ADDON: CPT

## 2022-08-28 PROCEDURE — 74011250636 HC RX REV CODE- 250/636

## 2022-08-28 PROCEDURE — 85025 COMPLETE CBC W/AUTO DIFF WBC: CPT

## 2022-08-28 PROCEDURE — 74011000250 HC RX REV CODE- 250: Performed by: NURSE PRACTITIONER

## 2022-08-28 PROCEDURE — 99285 EMERGENCY DEPT VISIT HI MDM: CPT

## 2022-08-28 PROCEDURE — 65270000029 HC RM PRIVATE

## 2022-08-28 PROCEDURE — 83690 ASSAY OF LIPASE: CPT

## 2022-08-28 PROCEDURE — 80053 COMPREHEN METABOLIC PANEL: CPT

## 2022-08-28 PROCEDURE — 74176 CT ABD & PELVIS W/O CONTRAST: CPT

## 2022-08-28 PROCEDURE — 74011250637 HC RX REV CODE- 250/637: Performed by: NURSE PRACTITIONER

## 2022-08-28 PROCEDURE — 71045 X-RAY EXAM CHEST 1 VIEW: CPT

## 2022-08-28 PROCEDURE — 74011250636 HC RX REV CODE- 250/636: Performed by: EMERGENCY MEDICINE

## 2022-08-28 PROCEDURE — 93005 ELECTROCARDIOGRAM TRACING: CPT

## 2022-08-28 PROCEDURE — 74011250636 HC RX REV CODE- 250/636: Performed by: NURSE PRACTITIONER

## 2022-08-28 RX ORDER — FLUTICASONE PROPIONATE 50 MCG
1 SPRAY, SUSPENSION (ML) NASAL DAILY
Status: DISCONTINUED | OUTPATIENT
Start: 2022-08-29 | End: 2022-08-31 | Stop reason: HOSPADM

## 2022-08-28 RX ORDER — LEVOTHYROXINE SODIUM 75 UG/1
75 TABLET ORAL
Status: DISCONTINUED | OUTPATIENT
Start: 2022-08-29 | End: 2022-08-31 | Stop reason: HOSPADM

## 2022-08-28 RX ORDER — SUCRALFATE 1 G/1
1 TABLET ORAL 4 TIMES DAILY
Status: DISCONTINUED | OUTPATIENT
Start: 2022-08-28 | End: 2022-08-31 | Stop reason: HOSPADM

## 2022-08-28 RX ORDER — ATORVASTATIN CALCIUM 10 MG/1
20 TABLET, FILM COATED ORAL
Status: DISCONTINUED | OUTPATIENT
Start: 2022-08-28 | End: 2022-08-31 | Stop reason: HOSPADM

## 2022-08-28 RX ORDER — DICYCLOMINE HYDROCHLORIDE 10 MG/1
10 CAPSULE ORAL DAILY
Status: DISCONTINUED | OUTPATIENT
Start: 2022-08-29 | End: 2022-08-31 | Stop reason: HOSPADM

## 2022-08-28 RX ORDER — ONDANSETRON 2 MG/ML
INJECTION INTRAMUSCULAR; INTRAVENOUS
Status: COMPLETED
Start: 2022-08-28 | End: 2022-08-28

## 2022-08-28 RX ORDER — ONDANSETRON 2 MG/ML
4 INJECTION INTRAMUSCULAR; INTRAVENOUS
Status: DISCONTINUED | OUTPATIENT
Start: 2022-08-28 | End: 2022-08-31 | Stop reason: HOSPADM

## 2022-08-28 RX ORDER — HYDROMORPHONE HYDROCHLORIDE 1 MG/ML
0.5 INJECTION, SOLUTION INTRAMUSCULAR; INTRAVENOUS; SUBCUTANEOUS
Status: DISCONTINUED | OUTPATIENT
Start: 2022-08-28 | End: 2022-08-31 | Stop reason: HOSPADM

## 2022-08-28 RX ORDER — LOSARTAN POTASSIUM 25 MG/1
50 TABLET ORAL DAILY
Status: DISCONTINUED | OUTPATIENT
Start: 2022-08-29 | End: 2022-08-31 | Stop reason: HOSPADM

## 2022-08-28 RX ORDER — ACETAMINOPHEN 650 MG/1
650 SUPPOSITORY RECTAL
Status: DISCONTINUED | OUTPATIENT
Start: 2022-08-28 | End: 2022-08-31 | Stop reason: HOSPADM

## 2022-08-28 RX ORDER — VALGANCICLOVIR 450 MG/1
900 TABLET, FILM COATED ORAL DAILY
Status: DISCONTINUED | OUTPATIENT
Start: 2022-08-29 | End: 2022-08-31 | Stop reason: HOSPADM

## 2022-08-28 RX ORDER — CARVEDILOL 12.5 MG/1
25 TABLET ORAL 2 TIMES DAILY WITH MEALS
Status: DISCONTINUED | OUTPATIENT
Start: 2022-08-28 | End: 2022-08-31 | Stop reason: HOSPADM

## 2022-08-28 RX ORDER — AMIODARONE HYDROCHLORIDE 200 MG/1
200 TABLET ORAL DAILY
Status: DISCONTINUED | OUTPATIENT
Start: 2022-08-29 | End: 2022-08-31 | Stop reason: HOSPADM

## 2022-08-28 RX ORDER — PANTOPRAZOLE SODIUM 40 MG/1
40 TABLET, DELAYED RELEASE ORAL DAILY
Status: DISCONTINUED | OUTPATIENT
Start: 2022-08-29 | End: 2022-08-31 | Stop reason: HOSPADM

## 2022-08-28 RX ORDER — MORPHINE SULFATE 4 MG/ML
4 INJECTION, SOLUTION INTRAMUSCULAR; INTRAVENOUS
Status: COMPLETED | OUTPATIENT
Start: 2022-08-28 | End: 2022-08-28

## 2022-08-28 RX ORDER — CEPHALEXIN 250 MG/1
250 CAPSULE ORAL EVERY 12 HOURS
Status: DISCONTINUED | OUTPATIENT
Start: 2022-08-28 | End: 2022-08-29

## 2022-08-28 RX ORDER — GABAPENTIN 100 MG/1
200 CAPSULE ORAL 2 TIMES DAILY
Status: DISCONTINUED | OUTPATIENT
Start: 2022-08-28 | End: 2022-08-29

## 2022-08-28 RX ORDER — MONTELUKAST SODIUM 10 MG/1
10 TABLET ORAL DAILY
Status: DISCONTINUED | OUTPATIENT
Start: 2022-08-29 | End: 2022-08-31 | Stop reason: HOSPADM

## 2022-08-28 RX ORDER — AMLODIPINE BESYLATE 5 MG/1
10 TABLET ORAL DAILY
Status: DISCONTINUED | OUTPATIENT
Start: 2022-08-29 | End: 2022-08-31 | Stop reason: HOSPADM

## 2022-08-28 RX ORDER — SODIUM CHLORIDE 0.9 % (FLUSH) 0.9 %
5-40 SYRINGE (ML) INJECTION AS NEEDED
Status: DISCONTINUED | OUTPATIENT
Start: 2022-08-28 | End: 2022-08-31 | Stop reason: HOSPADM

## 2022-08-28 RX ORDER — PREDNISONE 5 MG/1
5 TABLET ORAL DAILY
Status: DISCONTINUED | OUTPATIENT
Start: 2022-08-29 | End: 2022-08-31 | Stop reason: HOSPADM

## 2022-08-28 RX ORDER — SEVELAMER CARBONATE 800 MG/1
800 TABLET, FILM COATED ORAL 3 TIMES DAILY
Status: DISCONTINUED | OUTPATIENT
Start: 2022-08-28 | End: 2022-08-31 | Stop reason: HOSPADM

## 2022-08-28 RX ORDER — SODIUM CHLORIDE 0.9 % (FLUSH) 0.9 %
5-40 SYRINGE (ML) INJECTION EVERY 8 HOURS
Status: DISCONTINUED | OUTPATIENT
Start: 2022-08-28 | End: 2022-08-31 | Stop reason: HOSPADM

## 2022-08-28 RX ORDER — SODIUM CHLORIDE 9 MG/ML
50 INJECTION, SOLUTION INTRAVENOUS CONTINUOUS
Status: DISCONTINUED | OUTPATIENT
Start: 2022-08-28 | End: 2022-08-31

## 2022-08-28 RX ORDER — ACETAMINOPHEN 325 MG/1
650 TABLET ORAL
Status: DISCONTINUED | OUTPATIENT
Start: 2022-08-28 | End: 2022-08-31 | Stop reason: HOSPADM

## 2022-08-28 RX ORDER — ONDANSETRON 4 MG/1
4 TABLET, ORALLY DISINTEGRATING ORAL
Status: DISCONTINUED | OUTPATIENT
Start: 2022-08-28 | End: 2022-08-31 | Stop reason: HOSPADM

## 2022-08-28 RX ORDER — ONDANSETRON 2 MG/ML
4 INJECTION INTRAMUSCULAR; INTRAVENOUS
Status: COMPLETED | OUTPATIENT
Start: 2022-08-28 | End: 2022-08-28

## 2022-08-28 RX ADMIN — MORPHINE SULFATE 4 MG: 4 INJECTION, SOLUTION INTRAMUSCULAR; INTRAVENOUS at 10:43

## 2022-08-28 RX ADMIN — SODIUM CHLORIDE, PRESERVATIVE FREE 10 ML: 5 INJECTION INTRAVENOUS at 16:13

## 2022-08-28 RX ADMIN — SODIUM CHLORIDE 50 ML/HR: 9 INJECTION, SOLUTION INTRAVENOUS at 14:44

## 2022-08-28 RX ADMIN — SEVELAMER CARBONATE 800 MG: 800 TABLET, FILM COATED ORAL at 21:54

## 2022-08-28 RX ADMIN — ONDANSETRON 4 MG: 2 INJECTION INTRAMUSCULAR; INTRAVENOUS at 21:16

## 2022-08-28 RX ADMIN — CEPHALEXIN 250 MG: 250 CAPSULE ORAL at 21:54

## 2022-08-28 RX ADMIN — ONDANSETRON 4 MG: 2 INJECTION INTRAMUSCULAR; INTRAVENOUS at 14:56

## 2022-08-28 RX ADMIN — ATORVASTATIN CALCIUM 20 MG: 10 TABLET, FILM COATED ORAL at 21:54

## 2022-08-28 RX ADMIN — SODIUM CHLORIDE, PRESERVATIVE FREE 10 ML: 5 INJECTION INTRAVENOUS at 21:56

## 2022-08-28 RX ADMIN — ONDANSETRON 4 MG: 2 INJECTION INTRAMUSCULAR; INTRAVENOUS at 10:41

## 2022-08-28 RX ADMIN — HYDROMORPHONE HYDROCHLORIDE 0.5 MG: 1 INJECTION, SOLUTION INTRAMUSCULAR; INTRAVENOUS; SUBCUTANEOUS at 21:16

## 2022-08-28 RX ADMIN — SUCRALFATE 1 G: 1 TABLET ORAL at 21:54

## 2022-08-28 RX ADMIN — HYDROMORPHONE HYDROCHLORIDE 0.5 MG: 1 INJECTION, SOLUTION INTRAMUSCULAR; INTRAVENOUS; SUBCUTANEOUS at 14:19

## 2022-08-28 NOTE — ROUTINE PROCESS
TRANSFER - OUT REPORT:    Verbal report given to Xavier Parson LPN(name) on Dana Barbosa  being transferred to 241(unit) for routine progression of care       Report consisted of patients Situation, Background, Assessment and   Recommendations(SBAR). Information from the following report(s) SBAR, ED Summary, MAR, and Recent Results was reviewed with the receiving nurse. Lines:   Peripheral IV 08/28/22 Right Antecubital (Active)   Site Assessment Clean, dry, & intact 08/28/22 1024   Phlebitis Assessment 0 08/28/22 1024   Infiltration Assessment 0 08/28/22 1024   Dressing Status Clean, dry, & intact 08/28/22 1024   Hub Color/Line Status Blue 08/28/22 1024   Action Taken Blood drawn 08/28/22 1024        Opportunity for questions and clarification was provided.       Patient transported with:   [facility] [employer]

## 2022-08-28 NOTE — Clinical Note
Status[de-identified] INPATIENT [101]   Type of Bed: Telemetry [19]   Cardiac Monitoring Required?: Yes   Inpatient Hospitalization Certified Necessary for the Following Reasons: 1.  Patient Failed outpatient treatment (further clarification in H&P documentation)   Admitting Diagnosis: Pancreatitis [212828]   Admitting Diagnosis: Diverticulitis [746243]   Admitting Physician: Destiny Walton [[id number]]   Attending Physician: Destiny Walton [[id number]]   Estimated Length of Stay: 3-4 Midnights   Discharge Plan[de-identified] Home with Office Follow-up
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
## 2022-08-28 NOTE — ASSESSMENT & PLAN NOTE
-NPO except for meds  -pain mgt Dilaudid  -blood cultures***  -antibiotics (Zosyn and Levofloxacin)  -clear liquids in 24 hours, advance as tolerated  -when tolerating diet switch to PO antibiotics  -CT abd if no improvement***

## 2022-08-28 NOTE — PROGRESS NOTES
1335 Received patient from ER. Patient stood and walked to bed from stretcher. No problems with ambulating during transfer. Oriented to room and call bell. L limb alert placed on patient for L dialysis shunt in upper arm. 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 NPO at this time per [facility]. May have ice chips occasionally. No needs voiced at this time. Call bell in reach with bed in lowest position. 1419 Pain medication administered per order for 9/10 pain all over. 1456 Zofran administered for c/o nausea. Patient does not want PO meds this evening. 1600 Patient resting in bed with eyes closed. No s/s of pain or discomfort. No SOB or dyspnea noted. Call bell in reach with bed in lowest position. [address] in to check on patient. She states she is feeling a little better. No needs voiced. Call bell in reach with bed in lowest position.

## 2022-08-28 NOTE — PROGRESS NOTES
1900-Assumed care of pt from off going nurse. 1910-Attempted to call Dr. Ira Faria nephrology doctor in regards to patient being admitted to hospital. Will pass on to day shift in the morning to call in the A.M.     2000-Pt resting in bed, no acute distress or sob, did request to get zofran and pain medicine as soon as she can for pain of 9/10 and nausea. No other needs voiced, call bell in reach. 2030-Pt noted with change in rhythm NSR to 1st degree block, hospitalist GASPER Ortega NP informed order for STAT ekg. Pt is asymptomatic. 2116-PRN dilaudid and zofran given, lab unable to obtain troponin blood draw nurse at bedside obtained blood sample, given to  to take down to lab.     2200-HS meds given, no needs voiced, no acute distress or sob, call bell in reach. 0000-Pt resting in bed, no acute distress or sob, call bell in reach. 0347-Pt requested PRN dilaudid and zofran, no other needs voiced, call bell in reach. 0600-Pt resting in bed, no acute distress or sob, call bell in reach.

## 2022-08-28 NOTE — ASSESSMENT & PLAN NOTE
-Lipase 605  -NPO with meds with sips of water and start gentle IVF NS @ 50 ml/hr  -monitor serum electrlytes  -supportive care with Dilaudid and antiemetics  -in 24-48 hrs start clear liquid diet and advance to low fat

## 2022-08-28 NOTE — ED PROVIDER NOTES
The patient is a 31-year-old woman with past medical history significant for all of the medical problems as listed below including end-stage renal disease on dialysis Monday and Friday, who presents to the ED today with weakness, nausea, vomiting right-sided rib pain and abdominal pain. She also states that it \"feels funny\" when she urinates. She states that she feels that way when she gets a urinary tract infection. She states that she has not been able to eat any food for the past 2 days. She has only been able to drink 1 bottle of water per day.            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    Dialysis patient [facility])     M/W/F    Diverticular disease of colon 08/21/2013    Dyspepsia and other specified disorders of function of stomach     stomach ulcer    Elevated troponin 10/21/2019    Essential hypertension     GERD (gastroesophageal reflux disease)     History of chemotherapy     History of renal transplant 08/21/2013    (LD 2/12/2002)    HLD (hyperlipidemia)     Hypercholesteremia     Hypertension     Hypothyroidism 03/23/2022    Kidney transplant rejection     Menopause     Migraine     Obesity     Pyelonephritis 07/13/2020    Renal cyst 2002    kidney transplant     Spontaneous bacterial peritonitis ([facility] [address] 01/16/2019    Ulcer        Past Surgical History:   Procedure Laterality Date    COLONOSCOPY      Dr Isael Delacruz  5yrs ago    ENDOSCOPY VISIT-OUTPATIENT      Dr Isael Delacruz  5 yrs ago    ENDOSCOPY, COLON, DIAGNOSTIC      with Dr. Duque Needs CHOLECYSTECTOMY  02/2021    HX GI      ulcer    HX HEENT      sinus surg    HX RENAL TRANSPLANT      HX TRANSPLANT kidney transplant 2002    VASCULAR SURGERY PROCEDURE UNLIST      shunt in prep for dialysis         Family History:   Problem Relation Age of Onset    Cervical Cancer Mother     Arthritis-rheumatoid Mother     Hypertension Father     Diabetes Father     Thyroid Disease Sister     Colon Polyps Brother        Social History     Socioeconomic History    Marital status:      Spouse name: Not on file    Number of children: Not on file    Years of education: Not on file    Highest education level: Not on file   Occupational History    Not on file   Tobacco Use    Smoking status: Never    Smokeless tobacco: Never   Vaping Use    Vaping Use: Never used   Substance and Sexual Activity    Alcohol use: No    Drug use: No    Sexual activity: Not Currently   Other Topics Concern    Not on file   Social History Narrative    Not on file     Social Determinants of Health     Financial Resource Strain: Not on file   Food Insecurity: Not on file   Transportation Needs: Not on file   Physical Activity: Not on file   Stress: Not on file   Social Connections: Not on file   Intimate Partner Violence: Not on file   Housing Stability: Not on file         ALLERGIES: Aspirin, Bactrim [sulfamethoxazole-trimethoprim], Bromfenac, Cefepime, Ceftriaxone, Copper, Ibuprofen, Ketorolac tromethamine, Relafen [nabumetone], Rifampin, and Vancomycin    Review of Systems   All other systems reviewed and are negative. Vitals:    08/28/22 1001 08/28/22 1025   BP: (!) 154/71    Pulse: 78    Resp: 18    Temp: 97.4 °F (36.3 °C)    SpO2: 98% 98%   Weight: 65.3 kg (144 lb)    Height: 5' 1\" (1.549 m)             Physical Exam  Vitals and nursing note reviewed. Constitutional:       Appearance: She is well-developed. HENT:      Head: Normocephalic and atraumatic. Mouth/Throat:      Mouth: Mucous membranes are moist.      Pharynx: Oropharynx is clear. Eyes:      Extraocular Movements: Extraocular movements intact.       Pupils: Pupils are equal, round, and reactive to light. Cardiovascular:      Rate and Rhythm: Normal rate and regular rhythm. Heart sounds: Normal heart sounds. Pulmonary:      Effort: Pulmonary effort is normal.      Breath sounds: Normal breath sounds. Comments: Right lower anterior chest wall tenderness to palpation  Chest:      Chest wall: Tenderness present. Abdominal:      General: Abdomen is flat. Bowel sounds are normal.      Tenderness: There is abdominal tenderness in the right upper quadrant. Skin:     General: Skin is warm and dry. Capillary Refill: Capillary refill takes less than 2 seconds. Neurological:      General: No focal deficit present. Mental Status: She is alert and oriented to person, place, and time. Psychiatric:         Mood and Affect: Mood normal.         Behavior: Behavior normal.      Recent Results (from the past 12 hour(s))   CBC WITH AUTOMATED DIFF    Collection Time: 08/28/22 10:14 AM   Result Value Ref Range    WBC 7.1 4.6 - 13.2 K/uL    RBC 3.63 (L) 4.20 - 5.30 M/uL    HGB 11.6 (L) 12.0 - 16.0 g/dL    HCT 36.4 35.0 - 45.0 %    .3 (H) 78.0 - 100.0 FL    MCH 32.0 24.0 - 34.0 PG    MCHC 31.9 31.0 - 37.0 g/dL    RDW 16.9 (H) 11.6 - 14.5 %    PLATELET 907 487 - 130 K/uL    MPV 9.7 9.2 - 11.8 FL    NRBC 0.0 0.0  WBC    ABSOLUTE NRBC 0.00 0.00 - 0.01 K/uL    NEUTROPHILS 66 40 - 73 %    LYMPHOCYTES 24 21 - 52 %    MONOCYTES 8 3 - 10 %    EOSINOPHILS 1 0 - 5 %    BASOPHILS 1 0 - 2 %    IMMATURE GRANULOCYTES 0 0 - 0.5 %    ABS. NEUTROPHILS 4.7 1.8 - 8.0 K/UL    ABS. LYMPHOCYTES 1.7 0.9 - 3.6 K/UL    ABS. MONOCYTES 0.6 0.05 - 1.2 K/UL    ABS. EOSINOPHILS 0.1 0.0 - 0.4 K/UL    ABS. BASOPHILS 0.1 0.0 - 0.1 K/UL    ABS. IMM.  GRANS. 0.0 0.00 - 0.04 K/UL    DF AUTOMATED     URINALYSIS W/ RFLX MICROSCOPIC    Collection Time: 08/28/22 10:25 AM   Result Value Ref Range    Color Yellow      Appearance Clear      Specific gravity 1.008 1.005 - 1.030      pH (UA) 8.5 (H) 5.0 - 8.0      Protein 300 (A) Negative mg/dL    Glucose Negative Negative mg/dL    Ketone Negative Negative mg/dL    Bilirubin Negative Negative      Blood Negative Negative      Urobilinogen 0.2 0.2 - 1.0 EU/dL    Nitrites Negative Negative      Leukocyte Esterase Small (A) Negative     URINE MICROSCOPIC    Collection Time: 08/28/22 10:25 AM   Result Value Ref Range    WBC 0-4 0 - 4 /hpf    RBC 0-5 0 - 2 /hpf    Epithelial cells Few 0 - 20 /lpf    Bacteria 1+ (A) None /hpf    Budding yeast Present     PHOSPHORUS    Collection Time: 08/28/22 11:20 AM   Result Value Ref Range    Phosphorus 4.3 2.5 - 4.9 mg/dL   METABOLIC PANEL, COMPREHENSIVE    Collection Time: 08/28/22 11:20 AM   Result Value Ref Range    Sodium 136 136 - 145 mmol/L    Potassium 4.0 3.5 - 5.5 mmol/L    Chloride 99 (L) 100 - 111 mmol/L    CO2 29 21 - 32 mmol/L    Anion gap 8 3.0 - 18.0 mmol/L    Glucose 72 (L) 74 - 99 mg/dL    BUN 28 (H) 7 - 18 mg/dL    Creatinine 7.37 (H) 0.60 - 1.30 mg/dL    BUN/Creatinine ratio 4 (L) 12 - 20      GFR est AA 7 (L) >60 ml/min/1.73m2    GFR est non-AA 6 (L) >60 ml/min/1.73m2    Calcium 8.2 (L) 8.5 - 10.1 mg/dL    Bilirubin, total 0.4 0.2 - 1.0 mg/dL    AST (SGOT) 38 10 - 38 U/L    ALT (SGPT) 34 13 - 56 U/L    Alk. phosphatase 87 45 - 117 U/L    Protein, total 6.4 6.4 - 8.2 g/dL    Albumin 3.1 (L) 3.4 - 5.0 g/dL    Globulin 3.3 2.0 - 4.0 g/dL    A-G Ratio 0.9 0.8 - 1.7     LIPASE    Collection Time: 08/28/22 11:20 AM   Result Value Ref Range    Lipase 605 (H) 73 - 393 U/L     XR CHEST PORT   Final Result         1. Probable sixth right posterior rib fracture. Clinical correlation with point   tenderness is recommended. CT ABD PELV WO CONT   Final Result         1. Mild sigmoid diverticulitis without evidence for abscess or perforation. Follow-up to resolution is recommended. 2. Mild stranding along the pancreatic tail may reflect pancreatitis. Probable   pancreatic pseudocysts.  Correlation with amylase/lipase is recommended. 3. Stable right lower quadrant transplant kidney. MDM  Number of Diagnoses or Management Options  Acute pancreatitis without infection or necrosis, unspecified pancreatitis type  Closed fracture of one rib of right side, initial encounter  Diverticulitis  Diagnosis management comments: The patient is a 82-year-old woman with past medical history significant for all of the medical problems listed above, who also has end-stage renal disease on dialysis, Monday and Friday, who presents to the ED today with weakness, nausea, vomiting, decreased appetite, right lower rib cage pain, a funny feeling when she urinates, and abdominal pain. She appears to have a right posterior sixth rib fracture. She also has mild sigmoid diverticulitis and some stranding of the pancreatic tail that may be consistent with pancreatitis. She most likely also has a pancreatic pseudocyst.    I have consulted the hospitalist for admission for further evaluation and management. They have accepted the patient on their service.            Procedures
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
## 2022-08-29 ENCOUNTER — APPOINTMENT (OUTPATIENT)
Dept: ULTRASOUND IMAGING | Age: 67
DRG: 438 | End: 2022-08-29
Attending: NURSE PRACTITIONER
Payer: MEDICARE

## 2022-08-29 LAB
ALBUMIN SERPL-MCNC: 2.9 G/DL (ref 3.4–5)
ALBUMIN/GLOB SERPL: 0.9 {RATIO} (ref 0.8–1.7)
ALP SERPL-CCNC: 125 U/L (ref 45–117)
ALT SERPL-CCNC: 45 U/L (ref 13–56)
ANION GAP SERPL CALC-SCNC: 9 MMOL/L (ref 3–18)
AST SERPL W P-5'-P-CCNC: 49 U/L (ref 10–38)
ATRIAL RATE: 80 BPM
BILIRUB SERPL-MCNC: 0.5 MG/DL (ref 0.2–1)
BUN SERPL-MCNC: 31 MG/DL (ref 7–18)
BUN/CREAT SERPL: 4 (ref 12–20)
CA-I BLD-MCNC: 8 MG/DL (ref 8.5–10.1)
CALCULATED P AXIS, ECG09: 47 DEGREES
CALCULATED R AXIS, ECG10: 56 DEGREES
CALCULATED T AXIS, ECG11: 67 DEGREES
CHLORIDE SERPL-SCNC: 101 MMOL/L (ref 100–111)
CO2 SERPL-SCNC: 25 MMOL/L (ref 21–32)
CREAT SERPL-MCNC: 8.23 MG/DL (ref 0.6–1.3)
DIAGNOSIS, 93000: NORMAL
ERYTHROCYTE [DISTWIDTH] IN BLOOD BY AUTOMATED COUNT: 16.9 % (ref 11.6–14.5)
GLOBULIN SER CALC-MCNC: 3.1 G/DL (ref 2–4)
GLUCOSE SERPL-MCNC: 68 MG/DL (ref 74–99)
HCT VFR BLD AUTO: 31.6 % (ref 35–45)
HGB BLD-MCNC: 10 G/DL (ref 12–16)
LIPASE SERPL-CCNC: 682 U/L (ref 73–393)
MAGNESIUM SERPL-MCNC: 2.1 MG/DL (ref 1.6–2.6)
MCH RBC QN AUTO: 31.7 PG (ref 24–34)
MCHC RBC AUTO-ENTMCNC: 31.6 G/DL (ref 31–37)
MCV RBC AUTO: 100.3 FL (ref 78–100)
NRBC # BLD: 0 K/UL (ref 0–0.01)
NRBC BLD-RTO: 0 PER 100 WBC
P-R INTERVAL, ECG05: 214 MS
PHOSPHATE SERPL-MCNC: 4.3 MG/DL (ref 2.5–4.9)
PLATELET # BLD AUTO: 170 K/UL (ref 135–420)
PMV BLD AUTO: 9.5 FL (ref 9.2–11.8)
POTASSIUM SERPL-SCNC: 4.2 MMOL/L (ref 3.5–5.5)
PROT SERPL-MCNC: 6 G/DL (ref 6.4–8.2)
Q-T INTERVAL, ECG07: 422 MS
QRS DURATION, ECG06: 86 MS
QTC CALCULATION (BEZET), ECG08: 486 MS
RBC # BLD AUTO: 3.15 M/UL (ref 4.2–5.3)
SODIUM SERPL-SCNC: 135 MMOL/L (ref 136–145)
VENTRICULAR RATE, ECG03: 80 BPM
WBC # BLD AUTO: 6.2 K/UL (ref 4.6–13.2)

## 2022-08-29 PROCEDURE — 90935 HEMODIALYSIS ONE EVALUATION: CPT

## 2022-08-29 PROCEDURE — 85027 COMPLETE CBC AUTOMATED: CPT

## 2022-08-29 PROCEDURE — 74011250636 HC RX REV CODE- 250/636: Performed by: INTERNAL MEDICINE

## 2022-08-29 PROCEDURE — 74011250637 HC RX REV CODE- 250/637: Performed by: INTERNAL MEDICINE

## 2022-08-29 PROCEDURE — 86706 HEP B SURFACE ANTIBODY: CPT

## 2022-08-29 PROCEDURE — 74011250637 HC RX REV CODE- 250/637: Performed by: NURSE PRACTITIONER

## 2022-08-29 PROCEDURE — 36415 COLL VENOUS BLD VENIPUNCTURE: CPT

## 2022-08-29 PROCEDURE — 5A1D70Z PERFORMANCE OF URINARY FILTRATION, INTERMITTENT, LESS THAN 6 HOURS PER DAY: ICD-10-PCS | Performed by: STUDENT IN AN ORGANIZED HEALTH CARE EDUCATION/TRAINING PROGRAM

## 2022-08-29 PROCEDURE — 80053 COMPREHEN METABOLIC PANEL: CPT

## 2022-08-29 PROCEDURE — 74011636637 HC RX REV CODE- 636/637: Performed by: NURSE PRACTITIONER

## 2022-08-29 PROCEDURE — 94760 N-INVAS EAR/PLS OXIMETRY 1: CPT

## 2022-08-29 PROCEDURE — 83735 ASSAY OF MAGNESIUM: CPT

## 2022-08-29 PROCEDURE — 94640 AIRWAY INHALATION TREATMENT: CPT

## 2022-08-29 PROCEDURE — 83690 ASSAY OF LIPASE: CPT

## 2022-08-29 PROCEDURE — 65270000029 HC RM PRIVATE

## 2022-08-29 PROCEDURE — 74011250636 HC RX REV CODE- 250/636: Performed by: NURSE PRACTITIONER

## 2022-08-29 PROCEDURE — 76705 ECHO EXAM OF ABDOMEN: CPT

## 2022-08-29 PROCEDURE — 87340 HEPATITIS B SURFACE AG IA: CPT

## 2022-08-29 PROCEDURE — 84100 ASSAY OF PHOSPHORUS: CPT

## 2022-08-29 RX ORDER — METRONIDAZOLE 500 MG/100ML
500 INJECTION, SOLUTION INTRAVENOUS EVERY 12 HOURS
Status: DISCONTINUED | OUTPATIENT
Start: 2022-08-29 | End: 2022-08-29

## 2022-08-29 RX ORDER — LEVOFLOXACIN 250 MG/1
250 TABLET ORAL
Status: DISCONTINUED | OUTPATIENT
Start: 2022-08-31 | End: 2022-08-31 | Stop reason: HOSPADM

## 2022-08-29 RX ORDER — CLOPIDOGREL BISULFATE 75 MG/1
75 TABLET ORAL DAILY
COMMUNITY
End: 2022-11-03 | Stop reason: ALTCHOICE

## 2022-08-29 RX ORDER — METRONIDAZOLE 250 MG/1
500 TABLET ORAL EVERY 12 HOURS
Status: DISCONTINUED | OUTPATIENT
Start: 2022-08-29 | End: 2022-08-31 | Stop reason: HOSPADM

## 2022-08-29 RX ORDER — METRONIDAZOLE 250 MG/1
500 TABLET ORAL EVERY 12 HOURS
Status: DISCONTINUED | OUTPATIENT
Start: 2022-08-30 | End: 2022-08-29

## 2022-08-29 RX ORDER — SODIUM CHLORIDE 9 MG/ML
25 INJECTION, SOLUTION INTRAVENOUS
Status: DISCONTINUED | OUTPATIENT
Start: 2022-08-29 | End: 2022-08-31 | Stop reason: HOSPADM

## 2022-08-29 RX ORDER — LEVOFLOXACIN 5 MG/ML
250 INJECTION, SOLUTION INTRAVENOUS
Status: DISCONTINUED | OUTPATIENT
Start: 2022-08-31 | End: 2022-08-29

## 2022-08-29 RX ORDER — LEVOFLOXACIN 5 MG/ML
500 INJECTION, SOLUTION INTRAVENOUS ONCE
Status: COMPLETED | OUTPATIENT
Start: 2022-08-29 | End: 2022-08-29

## 2022-08-29 RX ORDER — CINACALCET 30 MG/1
30 TABLET, FILM COATED ORAL DAILY
COMMUNITY

## 2022-08-29 RX ORDER — HYDROMORPHONE HYDROCHLORIDE 2 MG/1
1 TABLET ORAL
Status: DISCONTINUED | OUTPATIENT
Start: 2022-08-29 | End: 2022-08-31 | Stop reason: HOSPADM

## 2022-08-29 RX ADMIN — ONDANSETRON 4 MG: 2 INJECTION INTRAMUSCULAR; INTRAVENOUS at 09:58

## 2022-08-29 RX ADMIN — LEVOTHYROXINE SODIUM 75 MCG: 0.07 TABLET ORAL at 06:42

## 2022-08-29 RX ADMIN — CARVEDILOL 25 MG: 12.5 TABLET, FILM COATED ORAL at 08:35

## 2022-08-29 RX ADMIN — APIXABAN 2.5 MG: 2.5 TABLET, FILM COATED ORAL at 08:37

## 2022-08-29 RX ADMIN — ONDANSETRON 4 MG: 2 INJECTION INTRAMUSCULAR; INTRAVENOUS at 03:47

## 2022-08-29 RX ADMIN — SEVELAMER CARBONATE 800 MG: 800 TABLET, FILM COATED ORAL at 08:36

## 2022-08-29 RX ADMIN — FLUTICASONE PROPIONATE 1 SPRAY: 50 SPRAY, METERED NASAL at 08:38

## 2022-08-29 RX ADMIN — HYDROMORPHONE HYDROCHLORIDE 0.5 MG: 1 INJECTION, SOLUTION INTRAMUSCULAR; INTRAVENOUS; SUBCUTANEOUS at 16:42

## 2022-08-29 RX ADMIN — SUCRALFATE 1 G: 1 TABLET ORAL at 08:36

## 2022-08-29 RX ADMIN — MOMETASONE FUROATE AND FORMOTEROL FUMARATE DIHYDRATE 2 PUFF: 200; 5 AEROSOL RESPIRATORY (INHALATION) at 20:47

## 2022-08-29 RX ADMIN — APIXABAN 2.5 MG: 2.5 TABLET, FILM COATED ORAL at 17:49

## 2022-08-29 RX ADMIN — SUCRALFATE 1 G: 1 TABLET ORAL at 21:47

## 2022-08-29 RX ADMIN — METRONIDAZOLE 500 MG: 500 INJECTION, SOLUTION INTRAVENOUS at 16:43

## 2022-08-29 RX ADMIN — PREDNISONE 5 MG: 5 TABLET ORAL at 08:37

## 2022-08-29 RX ADMIN — HYDROMORPHONE HYDROCHLORIDE 0.5 MG: 1 INJECTION, SOLUTION INTRAMUSCULAR; INTRAVENOUS; SUBCUTANEOUS at 03:47

## 2022-08-29 RX ADMIN — DICYCLOMINE HYDROCHLORIDE 10 MG: 10 CAPSULE ORAL at 08:36

## 2022-08-29 RX ADMIN — HYDROMORPHONE HYDROCHLORIDE 0.5 MG: 1 INJECTION, SOLUTION INTRAMUSCULAR; INTRAVENOUS; SUBCUTANEOUS at 09:58

## 2022-08-29 RX ADMIN — AMIODARONE HYDROCHLORIDE 200 MG: 200 TABLET ORAL at 08:36

## 2022-08-29 RX ADMIN — SEVELAMER CARBONATE 800 MG: 800 TABLET, FILM COATED ORAL at 14:20

## 2022-08-29 RX ADMIN — MONTELUKAST 10 MG: 10 TABLET, FILM COATED ORAL at 08:36

## 2022-08-29 RX ADMIN — SUCRALFATE 1 G: 1 TABLET ORAL at 14:23

## 2022-08-29 RX ADMIN — HYDROMORPHONE HYDROCHLORIDE 1 MG: 2 TABLET ORAL at 22:30

## 2022-08-29 RX ADMIN — LEVOFLOXACIN 500 MG: 5 INJECTION, SOLUTION INTRAVENOUS at 14:20

## 2022-08-29 RX ADMIN — GABAPENTIN 200 MG: 100 CAPSULE ORAL at 08:36

## 2022-08-29 RX ADMIN — METRONIDAZOLE 500 MG: 250 TABLET ORAL at 23:02

## 2022-08-29 RX ADMIN — CEPHALEXIN 250 MG: 250 CAPSULE ORAL at 08:36

## 2022-08-29 RX ADMIN — ATORVASTATIN CALCIUM 20 MG: 10 TABLET, FILM COATED ORAL at 21:47

## 2022-08-29 RX ADMIN — CARVEDILOL 25 MG: 12.5 TABLET, FILM COATED ORAL at 16:42

## 2022-08-29 RX ADMIN — SEVELAMER CARBONATE 800 MG: 800 TABLET, FILM COATED ORAL at 21:47

## 2022-08-29 RX ADMIN — AMLODIPINE BESYLATE 10 MG: 5 TABLET ORAL at 08:37

## 2022-08-29 RX ADMIN — LOSARTAN POTASSIUM 50 MG: 25 TABLET, FILM COATED ORAL at 08:36

## 2022-08-29 NOTE — PROGRESS NOTES
HOSPITALIST PROGRESS NOTE  Cirilo Wang MD, [address]          Daily Progress Note: 8/29/2022      Subjective:     Patient is alert and oriented x4. Did not get significant rest overnight due to significant nausea and vomiting and continued right-sided abdominal pain. No overnight fever/chills, chest pain, shortness of breath, hypoxia noted. No significant diarrhea, melena, hematochezia noted. Counseled patient on increasing antibiotic dosing and coverage with IV Flagyl. Patient is awaiting further evaluation by nephrology to start her dialysis due to ESRD.       Medications reviewed  Current Facility-Administered Medications   Medication Dose Route Frequency    0.9% sodium chloride infusion  25 mL/hr IntraVENous DIALYSIS PRN    mometasone-formoterol (DULERA) 200mcg-5mcg/puff  2 Puff Inhalation BID RT    tiotropium bromide (SPIRIVA RESPIMAT) 2.5 mcg /actuation  2 Puff Inhalation DAILY    metroNIDAZOLE (FLAGYL) IVPB premix 500 mg  500 mg IntraVENous Q8H    HYDROmorphone (DILAUDID) injection 0.5 mg  0.5 mg IntraVENous Q6H PRN    0.9% sodium chloride infusion  50 mL/hr IntraVENous CONTINUOUS    sodium chloride (NS) flush 5-40 mL  5-40 mL IntraVENous Q8H    sodium chloride (NS) flush 5-40 mL  5-40 mL IntraVENous PRN    acetaminophen (TYLENOL) tablet 650 mg  650 mg Oral Q6H PRN    Or    acetaminophen (TYLENOL) suppository 650 mg  650 mg Rectal Q6H PRN    ondansetron (ZOFRAN ODT) tablet 4 mg  4 mg Oral Q8H PRN    Or    ondansetron (ZOFRAN) injection 4 mg  4 mg IntraVENous Q6H PRN    amiodarone (CORDARONE) tablet 200 mg  200 mg Oral DAILY    amLODIPine (NORVASC) tablet 10 mg  10 mg Oral DAILY    apixaban (ELIQUIS) tablet 2.5 mg  2.5 mg Oral BID    carvediloL (COREG) tablet 25 mg  25 mg Oral BID WITH MEALS    dicyclomine (BENTYL) capsule 10 mg  10 mg Oral DAILY    sevelamer carbonate (RENVELA) tab 800 mg  800 mg Oral TID    atorvastatin (LIPITOR) tablet 20 mg  20 mg Oral QHS    losartan (COZAAR) tablet 50 mg  50 mg Oral DAILY    levothyroxine (SYNTHROID) tablet 75 mcg  75 mcg Oral ACB    fluticasone propionate (FLONASE) 50 mcg/actuation nasal spray 1 Spray  1 Spray Both Nostrils DAILY    montelukast (SINGULAIR) tablet 10 mg  10 mg Oral DAILY    predniSONE (DELTASONE) tablet 5 mg  5 mg Oral DAILY    pantoprazole (PROTONIX) tablet 40 mg  40 mg Oral DAILY    sucralfate (CARAFATE) tablet 1 g  1 g Oral QID    valGANciclovir (VALCYTE) tablet 900 mg - patient supplied (Patient Supplied)  900 mg Oral DAILY       Review of Systems:   A comprehensive review of systems was negative except for that written in the HPI. Objective:   Physical Exam:     Visit Vitals  BP (!) 173/61 (BP 1 Location: Right leg, BP Patient Position: At rest)   Pulse 84   Temp 97.7 °F (36.5 °C)   Resp 19   Ht 5' 1\" (1.549 m)   Wt 69 kg (152 lb 3.2 oz)   SpO2 97%   BMI 28.76 kg/m²      O2 Device: None (Room air)  Patient Vitals for the past 8 hrs:   Temp Pulse Resp BP SpO2   22 0800 97.7 °F (36.5 °C) 84 19 (!) 173/61 97 %          Temp (24hrs), Av.9 °F (36.6 °C), Min:97.1 °F (36.2 °C), Max:98.8 °F (37.1 °C)    No intake/output data recorded.  1901 -  0700  In: 1152.5 [P.O.:360; I.V.:792.5]  Out: 100 [Urine:100]    General:  Alert, cooperative, no distress, appears stated age. Lungs:   Clear to auscultation bilaterally. Chest wall:  No tenderness or deformity. Heart:  Regular rate and rhythm, S1, S2 normal, no murmur, click, rub or gallop. Abdomen:   Soft, mildly distended with thickened tenderness to palpation of the right upper quadrant and periumbilical area with hypoactive bowel sounds. No masses,  No organomegaly. Extremities: Extremities normal, atraumatic, no cyanosis or edema. Pulses: 2+ and symmetric all extremities.    Skin: Skin color, texture, turgor normal. No rashes or lesions   Neurologic: No gross sensory or motor deficits     Data Review:       Recent Days:  Recent Labs     08/29/22  0400 08/28/22  1014   WBC 6.2 7.1   HGB 10.0* 11.6*   HCT 31.6* 36.4    193     Recent Labs     08/29/22  0400 08/28/22  1120   * 136   K 4.2 4.0    99*   CO2 25 29   GLU 68* 72*   BUN 31* 28*   CREA 8.23* 7.37*   CA 8.0* 8.2*   MG 2.1  --    PHOS 4.3 4.3   ALB 2.9* 3.1*   TBILI 0.5 0.4   ALT 45 34     No results for input(s): PH, PCO2, PO2, HCO3, FIO2 in the last 72 hours. 24 Hour Results:  Recent Results (from the past 24 hour(s))   EKG, 12 LEAD, INITIAL    Collection Time: 08/28/22  8:41 PM   Result Value Ref Range    Ventricular Rate 80 BPM    Atrial Rate 80 BPM    P-R Interval 214 ms    QRS Duration 86 ms    Q-T Interval 422 ms    QTC Calculation (Bezet) 486 ms    Calculated P Axis 47 degrees    Calculated R Axis 56 degrees    Calculated T Axis 67 degrees    Diagnosis       Sinus rhythm with 1st degree A-V block  ST & T wave abnormality, consider lateral ischemia  Prolonged QT  Abnormal ECG  When compared with ECG of 28-AUG-2022 20:41, (Unconfirmed)  No significant change was found  Confirmed by Cathleen Vargas ([id number]) on 8/29/2022 8:37:39 AM     TROPONIN-HIGH SENSITIVITY    Collection Time: 08/28/22  9:30 PM   Result Value Ref Range    Troponin-High Sensitivity 12 0 - 54 ng/L   METABOLIC PANEL, COMPREHENSIVE    Collection Time: 08/29/22  4:00 AM   Result Value Ref Range    Sodium 135 (L) 136 - 145 mmol/L    Potassium 4.2 3.5 - 5.5 mmol/L    Chloride 101 100 - 111 mmol/L    CO2 25 21 - 32 mmol/L    Anion gap 9 3.0 - 18.0 mmol/L    Glucose 68 (L) 74 - 99 mg/dL    BUN 31 (H) 7 - 18 mg/dL    Creatinine 8.23 (H) 0.60 - 1.30 mg/dL    BUN/Creatinine ratio 4 (L) 12 - 20      GFR est AA 6 (L) >60 ml/min/1.73m2    GFR est non-AA 5 (L) >60 ml/min/1.73m2    Calcium 8.0 (L) 8.5 - 10.1 mg/dL    Bilirubin, total 0.5 0.2 - 1.0 mg/dL    AST (SGOT) 49 (H) 10 - 38 U/L    ALT (SGPT) 45 13 - 56 U/L    Alk.  phosphatase 125 (H) 45 - 117 U/L    Protein, total 6.0 (L) 6.4 - 8.2 g/dL    Albumin 2.9 (L) 3.4 - 5.0 g/dL    Globulin 3.1 2.0 - 4.0 g/dL    A-G Ratio 0.9 0.8 - 1.7     CBC W/O DIFF    Collection Time: 08/29/22  4:00 AM   Result Value Ref Range    WBC 6.2 4.6 - 13.2 K/uL    RBC 3.15 (L) 4.20 - 5.30 M/uL    HGB 10.0 (L) 12.0 - 16.0 g/dL    HCT 31.6 (L) 35.0 - 45.0 %    .3 (H) 78.0 - 100.0 FL    MCH 31.7 24.0 - 34.0 PG    MCHC 31.6 31.0 - 37.0 g/dL    RDW 16.9 (H) 11.6 - 14.5 %    PLATELET 912 368 - 364 K/uL    MPV 9.5 9.2 - 11.8 FL    NRBC 0.0 0.0  WBC    ABSOLUTE NRBC 0.00 0.00 - 0.01 K/uL   MAGNESIUM    Collection Time: 08/29/22  4:00 AM   Result Value Ref Range    Magnesium 2.1 1.6 - 2.6 mg/dL   PHOSPHORUS    Collection Time: 08/29/22  4:00 AM   Result Value Ref Range    Phosphorus 4.3 2.5 - 4.9 mg/dL   LIPASE    Collection Time: 08/29/22  4:00 AM   Result Value Ref Range    Lipase 682 (H) 73 - 393 U/L           Assessment/Plan:     Acute Pancreatitis  Lipase 605. Pain management and IV fluids with NS. Supportive care with Dilaudid and antiemetics. CT abdomen: Mild stranding along the pancreatic tail may reflect pancreatitis. Probable pancreatic pseudocysts. Obtain right upper quadrant ultrasound as well as general surgery consultation. Persistent nausea and vomiting patient may require NG tube for decompression. Diverticulitis  Complained of having an low grade fever, no noted leukocytosis and afebrile, symptomatic treatment for now. Clear liquids. CT abd: Mild sigmoid diverticulitis without evidence for abscess or perforation. UTI  UA shown small leukocyte esterase with 1+ bacteria. Urine culture pending. Essential hypertension  Continue Coreg, Norvasc, Cozaar. Hyperlipidemia  Continue statin. Atrial fibrillation ([address]  Continue Coreg and Eliquis     ESRD (end stage renal disease) on dialysis [facility])  Nephrologist consulted to continue HS on Monday and Friday.      Hypothyroidism  Continue Synthroid     GERD   Continue Dexilant    Code Status: Full     Prophylaxis  Eliquis       Care Plan discussed with: Patient/Family, Nurse, and     Total time spent with patient: 30 minutes. With greater than 50% spent in coordination of care and counseling.     Cesar Griffin MD

## 2022-08-29 NOTE — PROGRESS NOTES
Clinical Pharmacy Note: Metronidazole Dosing    Please note that the metronidazole dose for Ash Zepeda has been changed to 500 mg IVPB q12h per [facility]-approved protocol. Please contact the pharmacy with any questions. [address] [facility] [address] [facility] [address] Ph

## 2022-08-29 NOTE — PROGRESS NOTES
Care Management Interventions  PCP Verified by CM: Yes  Last Visit to PCP: 08/18/22  Palliative Care Criteria Met (RRAT>21 & CHF Dx)?: No  Transition of Care Consult (CM Consult): Discharge Planning  Physical Therapy Consult: No  Occupational Therapy Consult: No  Speech Therapy Consult: No  Support Systems: Child(lilibeth), Other Family Member(s)  Confirm Follow Up Transport: Family  The Plan for Transition of Care is Related to the Following Treatment Goals : Patient centered discharge planning to ensure smooth transition to community and [facility]. Discharge Location  Patient Expects to be Discharged to[de-identified] Home    Reason for Admission:   Chart reviewed and patient interviewed. Pt presented to ED with complaints of abdominal pain to lower quadrants with N/V/D and generalized weakness. CT of abdomen shows diverticulitis. CXR shows 6 probable rib fractures. Hospitalist consulted for admission. RUR Score:    20%       PCP: First and Last name:  Hany Rivera MD     Name of Practice:   Are you a current patient: Yes/No:   Approximate date of last visit:  8-18-22   Can you do a virtual visit with your PCP:              Resources/supports as identified by patient/family:   Lives with Sister                Top Challenges facing patient (as identified by patient/family and CM): Finances/Medication cost?    denies                Transportation? Family takes to appointments              Support system or lack thereof? Has family support                     Living arrangements? Lives with sister             Self-care/ADLs/Cognition?   Self care          Current Advanced Directive/Advance Care Plan:  Full Code      Healthcare Decision Maker:   Click here to complete HealthCare Decision Makers including selection of the Healthcare Decision Maker Relationship (ie \"Primary\")      Primary Decision Maker: Chichojennifer Michels - Daughter - [phone]    Payor Source Payor: Samantha Topete / Plan: [employer] MEDICARE CHOICE PPO/PFFS / Product Type: Managed Care Medicare /                             Plan for utilizing home health:   Denies want or need for [facility] when offered                  Transition of Care Plan:    Plan of care goals includes medication reconciliation, aligning pt with post-acute provider to ensure monitoring pt after discharge, and follow up appointment to be made by nursing staff at discharge. Patient centered discharge planning to ensure smooth transition to community and PLOF. Pt lives with her sister and is independent of ADLs, uses no DME and denies need for DME or HH at ID. Has dialysis mondays and Fridays in [facility]. CM will follow for DC needs.

## 2022-08-29 NOTE — PROGRESS NOTES
conducted an initial consultation and Spiritual Assessment for Wai Nichols, who is a 74 y.o.,female.      The reason the Patient came to the hospital is:   Patient Active Problem List    Diagnosis Date Noted    Diverticulitis 08/28/2022    Pancreatitis 08/28/2022    Segmental colitis associated with diverticulosis ([facility] 07/19/2022    Stenosis of left vertebral artery 04/12/2022    Nausea 04/07/2022    Weakness 04/07/2022    Fluid overload 02/02/2022    Atypical chest pain 12/24/2021    ESRD needing dialysis ([facility] 12/24/2021    Mild persistent asthma with (acute) exacerbation 12/24/2021    Intractable vomiting 08/09/2021    Left leg pain 07/14/2021    Acute hyperkalemia 06/12/2021    Encounter for cholecystectomy 05/06/2021    Acute left-sided low back pain without sciatica 05/06/2021    Atrial fibrillation ([facility] 05/06/2021    Chronic anticoagulation 05/06/2021    Stomatitis 03/02/2021    Thrush of mouth and esophagus ([facility] 03/02/2021    ESRD (end stage renal disease) on dialysis ([facility] 12/28/2020    History of DVT (deep vein thrombosis) 12/28/2020    Hypothyroidism 12/24/2020    Vertigo 12/24/2020    Calculus of gallbladder with acute cholecystitis without obstruction 10/09/2020    Acute recurrent maxillary sinusitis 08/06/2020    Ulcer     Obesity     Migraine     Hypertension     Hypercholesteremia     GERD (gastroesophageal reflux disease)     Burning with urination     Arrhythmia     Hyperkalemia 11/13/2016    Pruritus 09/29/2016    Chronic kidney disease, stage III (moderate) ([facility] 09/27/2016    Recurrent urinary tract infection 09/27/2016    Nausea and vomiting 04/10/2016    Diverticulitis of intestine 04/02/2016    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    Gastritis and duodenitis 02/23/2015    Anemia 11/10/2014    Disease due to gram-negative bacillus 10/17/2014    Fever 10/14/2014    Drug-induced hyperglycemia 06/28/2014    Exacerbation of asthma 06/25/2014    Systemic inflammatory response syndrome (SIRS) ([facility] 06/23/2014    Kidney transplant rejection 04/10/2014    Acute renal failure ([facility] 04/07/2014    Renal transplant disorder 04/07/2014    Systemic infection ([facility] 01/07/2014    Asthma 08/21/2013    Diverticular disease of large intestine 08/21/2013    Essential hypertension 08/21/2013    Seasonal allergic rhinitis due to pollen 08/21/2013    Hyperlipidemia 08/21/2013    UTI (urinary tract infection) 08/21/2013    Fecal incontinence 04/17/2013    History of kidney transplant 04/30/2012    CMV (cytomegalovirus) antibody positive 04/30/2012    Hematuria 04/30/2012    Migraine without status migrainosus, not intractable 02/24/2010    Renal cyst 01/01/2002        The  provided the following Interventions:  Initiated a relationship of care and support. Explored issues of cyrus, spirituality and/or Protestant needs while hospitalized. Listened empathically. Provided chaplaincy education. Provided information about Spiritual Care Services. Offered prayer and assurance of continued prayers on patient's behalf. Chart reviewed. The following outcomes were achieved:  Patient shared some information about their medical narrative and spiritual journey/beliefs. Patient processed feeling about current hospitalization. Patient expressed gratitude for the 's visit. Assessment:  Patient did not indicate any spiritual or Protestant issues which require Spiritual Care Services interventions at this time. Patient does not have any Protestant/cultural needs that will affect patients preferences in health care. Plan:  Chaplains will continue to follow and will provide pastoral care on an as needed or requested basis.  recommends bedside caregivers page  on duty if patient shows signs of acute spiritual or emotional distress. Per patient, a little weak. Patient hopeful to go home soon.  Patient discuss advance directive, admits daughter Charlesetta Galeazzi is primary decision maker. Prayer of comfort provided.      [facility]   Staff [address] [facility]   [phone]

## 2022-08-29 NOTE — PROGRESS NOTES
Pharmacy Note     levofloxacinmg 500mg q24h  ordered for treatment of intra-abdominal . Per [facility] Renal / Extended Infusion B Lactam Policy, levofloxacin will be changed to 500 mg once then 250 mg q 48h  . Estimated Creatinine Clearance: Estimated Creatinine Clearance: 6.1 mL/min (A) (based on SCr of 8.23 mg/dL (H)). Dialysis Status, JEFF, CKD: HD    BMI:  Body mass index is 28.76 kg/m². Recent Labs     22  0400 22  1014   WBC 6.2 7.1     Temp (24hrs), Av °F (36.7 °C), Min:97.1 °F (36.2 °C), Max:98.8 °F (37.1 °C)      Rationale for Adjustment:  renal fx. Pharmacy will continue to monitor and adjust dose as necessary. Please call with any questions.     Thank you,  Judie Willard, PHARMD

## 2022-08-29 NOTE — PROGRESS NOTES
Comprehensive Nutrition Assessment    Type and Reason for Visit: Initial    Nutrition Recommendations/Plan:   NPO     Malnutrition Assessment:  Malnutrition Status: At risk for malnutrition (specify) (NPO for diveticulitis and pancreatitis and N/V/Dx 3 days) (08/29/22 1147)    Context:  Acute illness     Findings of the 6 clinical characteristics of malnutrition:   Energy Intake:  Mild decrease in energy intake (specify) (NPO)  Weight Loss:  No significant weight loss     Body Fat Loss:  No significant body fat loss,     Muscle Mass Loss:  No significant muscle mass loss,    Fluid Accumulation:  No significant fluid accumulation,     Strength:  Not performed     Nutrition Assessment:    75 yo female PMH: HTN, ESRD with HD, hypothryoidism, A-fib, HLD    Nutrition Related Findings:    Overweight BMI 28.7 at 152 lbs pt admitted due to abdominal pain to LLQ and LRQ on Friday during HD. Pt also complians of N/V/D weakness. CT of abdomen showed diverticulitis and pancreatitis. made NPO started on IVF, IV ABx and pain management.  Wound Type: None    Recent Results (from the past 24 hour(s))   EKG, 12 LEAD, INITIAL    Collection Time: 08/28/22  8:41 PM   Result Value Ref Range    Ventricular Rate 80 BPM    Atrial Rate 80 BPM    P-R Interval 214 ms    QRS Duration 86 ms    Q-T Interval 422 ms    QTC Calculation (Bezet) 486 ms    Calculated P Axis 47 degrees    Calculated R Axis 56 degrees    Calculated T Axis 67 degrees    Diagnosis       Sinus rhythm with 1st degree A-V block  ST & T wave abnormality, consider lateral ischemia  Prolonged QT  Abnormal ECG  When compared with ECG of 28-AUG-2022 20:41, (Unconfirmed)  No significant change was found  Confirmed by Drake Marker ([id number]) on 8/29/2022 8:37:39 AM     TROPONIN-HIGH SENSITIVITY    Collection Time: 08/28/22  9:30 PM   Result Value Ref Range    Troponin-High Sensitivity 12 0 - 54 ng/L   METABOLIC PANEL, COMPREHENSIVE    Collection Time: 08/29/22  4:00 AM   Result Value Ref Range    Sodium 135 (L) 136 - 145 mmol/L    Potassium 4.2 3.5 - 5.5 mmol/L    Chloride 101 100 - 111 mmol/L    CO2 25 21 - 32 mmol/L    Anion gap 9 3.0 - 18.0 mmol/L    Glucose 68 (L) 74 - 99 mg/dL    BUN 31 (H) 7 - 18 mg/dL    Creatinine 8.23 (H) 0.60 - 1.30 mg/dL    BUN/Creatinine ratio 4 (L) 12 - 20      GFR est AA 6 (L) >60 ml/min/1.73m2    GFR est non-AA 5 (L) >60 ml/min/1.73m2    Calcium 8.0 (L) 8.5 - 10.1 mg/dL    Bilirubin, total 0.5 0.2 - 1.0 mg/dL    AST (SGOT) 49 (H) 10 - 38 U/L    ALT (SGPT) 45 13 - 56 U/L    Alk. phosphatase 125 (H) 45 - 117 U/L    Protein, total 6.0 (L) 6.4 - 8.2 g/dL    Albumin 2.9 (L) 3.4 - 5.0 g/dL    Globulin 3.1 2.0 - 4.0 g/dL    A-G Ratio 0.9 0.8 - 1.7     CBC W/O DIFF    Collection Time: 08/29/22  4:00 AM   Result Value Ref Range    WBC 6.2 4.6 - 13.2 K/uL    RBC 3.15 (L) 4.20 - 5.30 M/uL    HGB 10.0 (L) 12.0 - 16.0 g/dL    HCT 31.6 (L) 35.0 - 45.0 %    .3 (H) 78.0 - 100.0 FL    MCH 31.7 24.0 - 34.0 PG    MCHC 31.6 31.0 - 37.0 g/dL    RDW 16.9 (H) 11.6 - 14.5 %    PLATELET 234 980 - 631 K/uL    MPV 9.5 9.2 - 11.8 FL    NRBC 0.0 0.0  WBC    ABSOLUTE NRBC 0.00 0.00 - 0.01 K/uL   MAGNESIUM    Collection Time: 08/29/22  4:00 AM   Result Value Ref Range    Magnesium 2.1 1.6 - 2.6 mg/dL   PHOSPHORUS    Collection Time: 08/29/22  4:00 AM   Result Value Ref Range    Phosphorus 4.3 2.5 - 4.9 mg/dL   LIPASE    Collection Time: 08/29/22  4:00 AM   Result Value Ref Range    Lipase 682 (H) 73 - 393 U/L        Current Nutrition Intake & Therapies:  Average Meal Intake: NPO  Average Supplement Intake: NPO  DIET NPO Ice Chips, Sips of Water with Meds    Anthropometric Measures:  Height: 5' 1\" (154.9 cm)  Ideal Body Weight (IBW): 105 lbs (48 kg)  Admission Body Weight: 152 lb  Current Body Wt:  68.9 kg (152 lb), 144.8 % IBW. Bed scale  Current BMI (kg/m2): 28.7        Weight Adjustment: No adjustment                 BMI Category: Overweight (BMI 25.0-29. 9)    Estimated Daily Nutrient Needs:  Energy Requirements Based On: Kcal/kg (20-25 kcal/kg)  Weight Used for Energy Requirements: Admission (69- kg)  Energy (kcal/day): 2596-8832 kcal/day  Weight Used for Protein Requirements: Admission (0.8-1 g/kg)  Protein (g/day): 55-69 g/day  Method Used for Fluid Requirements: 1 ml/kcal  Fluid (ml/day): 8070-2635 mL/day    Nutrition Diagnosis:   Predicted inadequate energy intake related to altered GI function as evidenced by NPO or clear liquid status due to medical condition    Nutrition Interventions:   Food and/or Nutrient Delivery: Continue NPO  Nutrition Education/Counseling: Education not appropriate  Coordination of Nutrition Care: Continue to monitor while inpatient       Goals:     Goals: Initiate PO diet, by next RD assessment       Nutrition Monitoring and Evaluation:      Food/Nutrient Intake Outcomes: Food and nutrient intake, Diet advancement/tolerance  Physical Signs/Symptoms Outcomes: Meal time behavior, GI status, Weight, Nutrition focused physical findings    Follow up 8/31/2022    Discharge Planning:     Too soon to determine    [address]: ANTHONY [phone]

## 2022-08-29 NOTE — PROCEDURES
Hemodialysis / [phone]    Vitals Pre Post Assessment Pre Post   BP BP: 116/63 (08/29/22 1315) 124/56 LOC AxOx4 AxOx4   HR Pulse (Heart Rate): 69 (08/29/22 1315) 70 Lungs CTA Even and unlabored   Resp Resp Rate: 18 (08/29/22 1315) 16 Cardiac RRR RRR   Temp Temp: 98.5 °F (36.9 °C) (08/29/22 1315) 98.5 Skin Warm/dry Warm/dry   Weight    Edema No edema No edema   Tele status monitored monitored Pain Pain Intensity 1: 9 (08/29/22 0347) Not expressed     Orders   Duration: Start: 1310 End: 1542 Total: 2.5 hrs   Dialyzer: Dialyzer/Set Up Inspection: Revaclear (08/29/22 1315)   K Bath: Dialysate K (mEq/L): 2 (08/29/22 1315)   Ca Bath: Dialysate CA (mEq/L): 2.0 (08/29/22 1315)   Na: Dialysate NA (mEq/L): 140 (08/29/22 1315)   Bicarb: Dialysate HCO3 (mEq/L): 35 (08/29/22 1315)   Target Fluid Removal: Goal/Amount of Fluid to Remove (mL): 1500 mL (08/29/22 1315)     Access   Type & Location: L AVF   Comments:                                     Left upper arm AV Fistula without evidence of warmth, redness, or drainage. +thrill/+bruit. Each access site disinfected for 60 seconds per site with alcohol swabs per P&P. Cannulated with 15G needles x2 and secured with paper tape. +aspiration/+flushed.        Labs   HBsAg (Antigen) / date: Pending                                       HBsAb (Antibody) / date: Pending   Source:    Obtained/Reviewed  Critical Results Called HGB   Date Value Ref Range Status   08/29/2022 10.0 (L) 12.0 - 16.0 g/dL Final     Comment:     CHANGE NOTED     Potassium   Date Value Ref Range Status   08/29/2022 4.2 3.5 - 5.5 mmol/L Final     Calcium   Date Value Ref Range Status   08/29/2022 8.0 (L) 8.5 - 10.1 mg/dL Final     BUN   Date Value Ref Range Status   08/29/2022 31 (H) 7 - 18 mg/dL Final     Creatinine   Date Value Ref Range Status   08/29/2022 8.23 (H) 0.60 - 1.30 mg/dL Final        Meds Given   Name Dose Route   N/a                 Adequacy / Fluid    Total Liters Process: 51.1 L   Net Fluid Removed: 1000 mL      Comments   Time Out Done:   (Time) 1305   Admitting Diagnosis: Pancreatitis / Diverticulitis   Consent obtained/signed: Informed Consent Verified: Yes (08/29/22 1315)   Machine / Vonna Netter # Machine Number: [id number] (08/29/22 3333)   Primary Nurse Rpt Pre: Konrad Neumann RN   Primary Nurse Rpt Post: Konrad Neumann RN   Pt Education: Procedural / infection control   Care Plan: Continue current HD plan of care   Pts outpatient clinic: [facility]     Tx Summary   Comments:           5436 HD treatment initiated per physicians order. 1542 HD treatment completed per physician order. All possible blood returned to patient. Hemostasis achieved in <10 minutes. Access site dressings clean, dry, and intact. +thrill.

## 2022-08-29 NOTE — PROGRESS NOTES
Spoke with Jm [facility] office made aware that the patient is admitted will make him aware.

## 2022-08-29 NOTE — PROGRESS NOTES
Admit Date: 8/28/2022  F/u ESRD     Subjective:   Patient has no complaint of abdominal pain currently. .     Objective:     Visit Vitals  BP (!) 173/61 (BP 1 Location: Right leg, BP Patient Position: At rest)   Pulse 84   Temp 97.7 °F (36.5 °C)   Resp 19   Ht 5' 1\" (1.549 m)   Wt 69 kg (152 lb 3.2 oz)   SpO2 97%   BMI 28.76 kg/m²         Intake/Output Summary (Last 24 hours) at 8/29/2022 1228  Last data filed at 8/29/2022 5369  Gross per 24 hour   Intake 1152.5 ml   Output --   Net 1152.5 ml       General appearance: alert, cooperative, no distress, appears stated age  Lungs: clear to auscultation bilaterally  Heart: regular rate and rhythm, S1, S2 normal, no murmur, click, rub or gallop  Abdomen: soft, non-tender.  Bowel sounds normal. No masses,  no organomegaly, abnormal findings:  soft no rebound or guarding  Extremities: extremities normal, atraumatic, no cyanosis or edema, no edema      Impression:esrd possible diverticulitis     Plan:   Mwf dialysis orders placed  meds as per medicine        Current Facility-Administered Medications   Medication Dose Route Frequency Provider Last Rate Last Admin    0.9% sodium chloride infusion  25 mL/hr IntraVENous DIALYSIS PRN Tanesha Juárez MD        mometasone-formoterol (DULERA) 200mcg-5mcg/puff  2 Puff Inhalation BID RT Charline ReMD dharmesh        tiotropium bromide (SPIRIVA RESPIMAT) 2.5 mcg /actuation  2 Puff Inhalation DAILY Charline ReMD dharmesh        HYDROmorphone (DILAUDID) injection 0.5 mg  0.5 mg IntraVENous Q6H PRN Rosary Arabia S, ACNP   0.5 mg at 08/29/22 0958    0.9% sodium chloride infusion  50 mL/hr IntraVENous CONTINUOUS Rosary Arabia S, ACNP 50 mL/hr at 08/28/22 1444 50 mL/hr at 08/28/22 1444    sodium chloride (NS) flush 5-40 mL  5-40 mL IntraVENous Q8H Elis Ortega S, ACNP   10 mL at 08/28/22 2156    sodium chloride (NS) flush 5-40 mL  5-40 mL IntraVENous PRN Rosary Arabia S, ACNP        acetaminophen (TYLENOL) tablet 650 mg 650 mg Oral Q6H PRN Willa Shock S, ACNP        Or    acetaminophen (TYLENOL) suppository 650 mg  650 mg Rectal Q6H PRN Shannon, Elis S, ACNP        ondansetron (ZOFRAN ODT) tablet 4 mg  4 mg Oral Q8H PRN Shannon, Elis S, ACNP        Or    ondansetron (ZOFRAN) injection 4 mg  4 mg IntraVENous Q6H PRN Willa Shock S, ACNP   4 mg at 08/29/22 6396    amiodarone (CORDARONE) tablet 200 mg  200 mg Oral DAILY Willa Shock S, ACNP   200 mg at 08/29/22 0836    amLODIPine (NORVASC) tablet 10 mg  10 mg Oral DAILY Delora Chaz, ACNP   10 mg at 08/29/22 2608    apixaban (ELIQUIS) tablet 2.5 mg  2.5 mg Oral BID Willa Shock S, ACNP   2.5 mg at 08/29/22 9740    carvediloL (COREG) tablet 25 mg  25 mg Oral BID WITH MEALS Willa Shock S, ACNP   25 mg at 08/29/22 8392    dicyclomine (BENTYL) capsule 10 mg  10 mg Oral DAILY Delora Chaz, ACNP   10 mg at 08/29/22 1368    sevelamer carbonate (RENVELA) tab 800 mg  800 mg Oral TID Delora Chaz, ACNP   800 mg at 08/29/22 0836    atorvastatin (LIPITOR) tablet 20 mg  20 mg Oral QHS Willa Shock S, ACNP   20 mg at 08/28/22 2154    losartan (COZAAR) tablet 50 mg  50 mg Oral DAILY Delora Chaz, ACNP   50 mg at 08/29/22 0836    levothyroxine (SYNTHROID) tablet 75 mcg  75 mcg Oral ACB Delora Chaz, ACNP   75 mcg at 08/29/22 0642    fluticasone propionate (FLONASE) 50 mcg/actuation nasal spray 1 Spray  1 Spray Both Nostrils DAILY Nelia Ortega ACNP   1 Spray at 08/29/22 9966    montelukast (SINGULAIR) tablet 10 mg  10 mg Oral DAILY Delora Chaz, ACNP   10 mg at 08/29/22 0836    predniSONE (DELTASONE) tablet 5 mg  5 mg Oral DAILY Delora Chaz, ACNP   5 mg at 08/29/22 0837    pantoprazole (PROTONIX) tablet 40 mg  40 mg Oral DAILY Elis Ortega ACNP        cephALEXin (KEFLEX) capsule 250 mg  250 mg Oral Q12H TESSY Winn   250 mg at 08/29/22 0836    sucralfate (CARAFATE) tablet 1 g  1 g Oral QID TESSY Rivas   1 g at 08/29/22 1205    valGANciclovir (VALCYTE) tablet 900 mg - patient supplied (Patient Supplied)  900 mg Oral DAILY TESSY Rivas           Data Review:  CBC:   Lab Results   Component Value Date/Time    WBC 6.2 08/29/2022 04:00 AM    RBC 3.15 (L) 08/29/2022 04:00 AM    HGB 10.0 (L) 08/29/2022 04:00 AM    HCT 31.6 (L) 08/29/2022 04:00 AM    PLATELET 128 67/68/1606 04:00 AM   , BMP:   Lab Results   Component Value Date/Time    Glucose 68 (L) 08/29/2022 04:00 AM    Sodium 135 (L) 08/29/2022 04:00 AM    Potassium 4.2 08/29/2022 04:00 AM    Chloride 101 08/29/2022 04:00 AM    CO2 25 08/29/2022 04:00 AM    BUN 31 (H) 08/29/2022 04:00 AM    Creatinine 8.23 (H) 08/29/2022 04:00 AM    Calcium 8.0 (L) 08/29/2022 04:00 AM   , CMP:   Lab Results   Component Value Date/Time    Glucose 68 (L) 08/29/2022 04:00 AM    Sodium 135 (L) 08/29/2022 04:00 AM    Potassium 4.2 08/29/2022 04:00 AM    Chloride 101 08/29/2022 04:00 AM    CO2 25 08/29/2022 04:00 AM    BUN 31 (H) 08/29/2022 04:00 AM    Creatinine 8.23 (H) 08/29/2022 04:00 AM    Calcium 8.0 (L) 08/29/2022 04:00 AM    Anion gap 9 08/29/2022 04:00 AM    BUN/Creatinine ratio 4 (L) 08/29/2022 04:00 AM    Alk.  phosphatase 125 (H) 08/29/2022 04:00 AM    Protein, total 6.0 (L) 08/29/2022 04:00 AM    Albumin 2.9 (L) 08/29/2022 04:00 AM    Globulin 3.1 08/29/2022 04:00 AM    A-G Ratio 0.9 08/29/2022 04:00 AM   , PHOS:   Lab Results   Component Value Date/Time    Phosphorus 4.3 08/29/2022 04:00 AM     Recent Labs     08/29/22  0400   WBC 6.2   RBC 3.15*   HCT 31.6*   .3*   MCH 31.7   MCHC 31.6   RDW 16.9*

## 2022-08-29 NOTE — ACP (ADVANCE CARE PLANNING)
Advance Care Planning   Advance Care Planning Inpatient Note  [facility] Department    Today's Date: 8/29/2022  Unit: [address]    Received request from rounding. Upon review of chart and communication with care team, patient's decision making abilities are not in question. Patient was/were present in the room during visit. Goals of ACP Conversation:  Facilitate a discussion related to patient's goals of care as they align with the patient's values and beliefs    Health Care Decision Makers:      Primary Decision Maker: Farrah Delgado - Daughter - [phone]    Summary:  [address]  Patient express and verbalize daughter Farrah Delgado is primary decision maker.    Advance Care Planning Documents (Patient Wishes) on file:  None     Assessment:        Interventions:  Provided education on documents for clarity and greater understanding    Care Preferences Communicated:  No    Outcomes/Plan:  ACP Discussion Refused    Chaplain Patricia on 8/29/2022 at 11:19 AM

## 2022-08-30 ENCOUNTER — APPOINTMENT (OUTPATIENT)
Dept: PHYSICAL THERAPY | Age: 67
End: 2022-08-30
Payer: MEDICARE

## 2022-08-30 LAB
ALBUMIN SERPL-MCNC: 2.7 G/DL (ref 3.4–5)
ALBUMIN/GLOB SERPL: 0.9 {RATIO} (ref 0.8–1.7)
ALP SERPL-CCNC: 97 U/L (ref 45–117)
ALT SERPL-CCNC: 28 U/L (ref 13–56)
ANION GAP SERPL CALC-SCNC: 11 MMOL/L (ref 3–18)
AST SERPL W P-5'-P-CCNC: 17 U/L (ref 10–38)
BASOPHILS # BLD: 0 K/UL (ref 0–0.1)
BASOPHILS NFR BLD: 0 % (ref 0–2)
BILIRUB SERPL-MCNC: 0.4 MG/DL (ref 0.2–1)
BUN SERPL-MCNC: 21 MG/DL (ref 7–18)
BUN/CREAT SERPL: 4 (ref 12–20)
CA-I BLD-MCNC: 7.5 MG/DL (ref 8.5–10.1)
CHLORIDE SERPL-SCNC: 92 MMOL/L (ref 100–111)
CO2 SERPL-SCNC: 29 MMOL/L (ref 21–32)
CREAT SERPL-MCNC: 5.67 MG/DL (ref 0.6–1.3)
DIFFERENTIAL METHOD BLD: ABNORMAL
EOSINOPHIL # BLD: 0 K/UL (ref 0–0.4)
EOSINOPHIL NFR BLD: 1 % (ref 0–5)
ERYTHROCYTE [DISTWIDTH] IN BLOOD BY AUTOMATED COUNT: 16.5 % (ref 11.6–14.5)
GLOBULIN SER CALC-MCNC: 3.1 G/DL (ref 2–4)
GLUCOSE SERPL-MCNC: 180 MG/DL (ref 74–99)
HBV SURFACE AB SER QL: NONREACTIVE
HBV SURFACE AB SER-ACNC: <3.1 MIU/ML
HBV SURFACE AG SER QL: <0.1 INDEX
HBV SURFACE AG SER QL: NEGATIVE
HCT VFR BLD AUTO: 30.2 % (ref 35–45)
HGB BLD-MCNC: 9.7 G/DL (ref 12–16)
IMM GRANULOCYTES # BLD AUTO: 0 K/UL (ref 0–0.04)
IMM GRANULOCYTES NFR BLD AUTO: 0 % (ref 0–0.5)
LYMPHOCYTES # BLD: 1.3 K/UL (ref 0.9–3.6)
LYMPHOCYTES NFR BLD: 29 % (ref 21–52)
MCH RBC QN AUTO: 32.1 PG (ref 24–34)
MCHC RBC AUTO-ENTMCNC: 32.1 G/DL (ref 31–37)
MCV RBC AUTO: 100 FL (ref 78–100)
MONOCYTES # BLD: 0.4 K/UL (ref 0.05–1.2)
MONOCYTES NFR BLD: 9 % (ref 3–10)
NEUTS SEG # BLD: 2.7 K/UL (ref 1.8–8)
NEUTS SEG NFR BLD: 61 % (ref 40–73)
NRBC # BLD: 0 K/UL (ref 0–0.01)
NRBC BLD-RTO: 0 PER 100 WBC
PLATELET # BLD AUTO: 163 K/UL (ref 135–420)
PMV BLD AUTO: 9.2 FL (ref 9.2–11.8)
POTASSIUM SERPL-SCNC: 3.4 MMOL/L (ref 3.5–5.5)
PROT SERPL-MCNC: 5.8 G/DL (ref 6.4–8.2)
RBC # BLD AUTO: 3.02 M/UL (ref 4.2–5.3)
SODIUM SERPL-SCNC: 132 MMOL/L (ref 136–145)
WBC # BLD AUTO: 4.5 K/UL (ref 4.6–13.2)

## 2022-08-30 PROCEDURE — 85025 COMPLETE CBC W/AUTO DIFF WBC: CPT

## 2022-08-30 PROCEDURE — 94761 N-INVAS EAR/PLS OXIMETRY MLT: CPT

## 2022-08-30 PROCEDURE — 65270000029 HC RM PRIVATE

## 2022-08-30 PROCEDURE — 74011250637 HC RX REV CODE- 250/637: Performed by: INTERNAL MEDICINE

## 2022-08-30 PROCEDURE — 74011250637 HC RX REV CODE- 250/637: Performed by: NURSE PRACTITIONER

## 2022-08-30 PROCEDURE — 94640 AIRWAY INHALATION TREATMENT: CPT

## 2022-08-30 PROCEDURE — 80053 COMPREHEN METABOLIC PANEL: CPT

## 2022-08-30 PROCEDURE — 74011636637 HC RX REV CODE- 636/637: Performed by: NURSE PRACTITIONER

## 2022-08-30 RX ADMIN — SUCRALFATE 1 G: 1 TABLET ORAL at 17:19

## 2022-08-30 RX ADMIN — SUCRALFATE 1 G: 1 TABLET ORAL at 21:10

## 2022-08-30 RX ADMIN — AMIODARONE HYDROCHLORIDE 200 MG: 200 TABLET ORAL at 09:20

## 2022-08-30 RX ADMIN — METRONIDAZOLE 500 MG: 250 TABLET ORAL at 09:18

## 2022-08-30 RX ADMIN — HYDROMORPHONE HYDROCHLORIDE 1 MG: 2 TABLET ORAL at 12:53

## 2022-08-30 RX ADMIN — HYDROMORPHONE HYDROCHLORIDE 1 MG: 2 TABLET ORAL at 04:39

## 2022-08-30 RX ADMIN — PANTOPRAZOLE SODIUM 40 MG: 40 TABLET, DELAYED RELEASE ORAL at 09:20

## 2022-08-30 RX ADMIN — SEVELAMER CARBONATE 800 MG: 800 TABLET, FILM COATED ORAL at 21:11

## 2022-08-30 RX ADMIN — SUCRALFATE 1 G: 1 TABLET ORAL at 12:54

## 2022-08-30 RX ADMIN — ATORVASTATIN CALCIUM 20 MG: 10 TABLET, FILM COATED ORAL at 21:10

## 2022-08-30 RX ADMIN — CARVEDILOL 25 MG: 12.5 TABLET, FILM COATED ORAL at 08:00

## 2022-08-30 RX ADMIN — MONTELUKAST 10 MG: 10 TABLET, FILM COATED ORAL at 09:19

## 2022-08-30 RX ADMIN — HYDROMORPHONE HYDROCHLORIDE 1 MG: 2 TABLET ORAL at 19:21

## 2022-08-30 RX ADMIN — TIOTROPIUM BROMIDE INHALATION SPRAY 2 PUFF: 3.12 SPRAY, METERED RESPIRATORY (INHALATION) at 08:12

## 2022-08-30 RX ADMIN — LEVOTHYROXINE SODIUM 75 MCG: 0.07 TABLET ORAL at 06:40

## 2022-08-30 RX ADMIN — SUCRALFATE 1 G: 1 TABLET ORAL at 09:20

## 2022-08-30 RX ADMIN — METRONIDAZOLE 500 MG: 250 TABLET ORAL at 21:10

## 2022-08-30 RX ADMIN — PREDNISONE 5 MG: 5 TABLET ORAL at 09:19

## 2022-08-30 RX ADMIN — ONDANSETRON 4 MG: 4 TABLET, ORALLY DISINTEGRATING ORAL at 19:21

## 2022-08-30 RX ADMIN — DICYCLOMINE HYDROCHLORIDE 10 MG: 10 CAPSULE ORAL at 09:20

## 2022-08-30 RX ADMIN — APIXABAN 2.5 MG: 2.5 TABLET, FILM COATED ORAL at 17:19

## 2022-08-30 RX ADMIN — AMLODIPINE BESYLATE 10 MG: 5 TABLET ORAL at 09:20

## 2022-08-30 RX ADMIN — APIXABAN 2.5 MG: 2.5 TABLET, FILM COATED ORAL at 09:20

## 2022-08-30 RX ADMIN — MOMETASONE FUROATE AND FORMOTEROL FUMARATE DIHYDRATE 2 PUFF: 200; 5 AEROSOL RESPIRATORY (INHALATION) at 08:12

## 2022-08-30 RX ADMIN — LOSARTAN POTASSIUM 50 MG: 25 TABLET, FILM COATED ORAL at 09:20

## 2022-08-30 RX ADMIN — CARVEDILOL 25 MG: 12.5 TABLET, FILM COATED ORAL at 17:19

## 2022-08-30 NOTE — PROGRESS NOTES
Problem: Risk for Spread of Infection  Goal: Prevent transmission of infectious organism to others  Description: Prevent the transmission of infectious organisms to other patients, staff members, and visitors. Outcome: Progressing Towards Goal     Problem: Falls - Risk of  Goal: *Absence of Falls  Description: Document Cami Hernandez Fall Risk and appropriate interventions in the flowsheet.   Outcome: Progressing Towards Goal  Note: Fall Risk Interventions:            Medication Interventions: Teach patient to arise slowly                   Problem: Nutrition Deficit  Goal: *Optimize nutritional status  Outcome: Progressing Towards Goal

## 2022-08-30 NOTE — PROGRESS NOTES
0717 report received from Rafael, RN patient awake and alert. V/s and assessment completes at this time. Patient able to verbalize needs known.      0560 Per Dr. Krish Cast up grade patients diet to regular diet     1100 Patient awake watching TV quietly

## 2022-08-30 NOTE — PROGRESS NOTES
1900 - Assumed care of pt, shift report given    1955 - VSS. Assessment completed. LS clear/BS active. Pt denies any current nausea but states she has been nauseated throughout the day. C/O 7/10 generalized pain. Tolerating clear liquid diet well. IVF infusing to 22G in RAC per orders. Pt denies any needs at this time. CBWR     2112 - PIV in RAC infiltrated, PIV removed. This nurse attempted x2 to place new IV without success. Michelle Sanchez RN attempted x1 without success. NS and hospitalist made aware. 2147 - HS medication given, pt tolerated well.     2200 - NS unable to place new IV, hospitalist aware and states she will place orders for PO antibiotics and pain medication. 2230 - PRN dilaudid given for pt c/o 7/10 generalized pain. 2305 - Scheduled Flagyl given. VSS. Pt denies any needs at this time. CBWR     0158 - Pt requesting pain medication, educated pt on frequency of pain medication, pt verbalized understanding. 0330 - Rounded on pt, resting with eyes closed, appears to be asleep. RR even and unlabored. CBWR     0442 - VSS. Daily weight obtained. PRN Dilaudid given per request for c/o 8/10 generalized pain. Pt denies any further needs at this time. CBWR     0641 - Morning medication given, pt tolerated well. No needs voiced at this time.  CBWR

## 2022-08-30 NOTE — CONSULTS
General Surgery consult:    Reason for consult:  Abdominal pain    HPI:  the patient is a 75 y/o female with multiple medical problems to include HTN, ESRD, s/p renal transplant, hypothyroidism, atrial fibrillation and hyperlipidemia. She has renal dialysis 2 X / week. She presented to the E.R on the 28th with abdominal pain located in the LLQ and RLQ. The pain was constant and non radiating. The pain was associated with Nausea and vomiting. .  An abdominal CT showed uncomplicated sigmoid diverticulitis and possible pancreatitis. She was admitted to the medical floor, made NPO, started on IV antibiotics and surgery consult requested last p.m.  this morning, she states her abdominal pain is improved, denies fever, or recurrent N & V. She states she had a normal B.M yesterday and she is tolerating clear liquids. She is hungry and would like to increase her diet. PMHx:  See above, patient states she had had pancreatitis in the past  PSHx: s/p cholecystectomy             S/p kidney transplant             Dialysis access shunt in left arm    Fhx:  Non contributory    Social:  Non smoker, no ETOH    Medications prior tpo admission  Losartan  Flexeril  Amiodarone  Amlodipine  Carvedilol  Antivert  Trelegy inhaler  Eliquis    Allergies  ASA  Bactrim  Bromfenac  Cefepime  Ceftriaxone  Copper  Ibuprofen  Ketorlac  Relafen  Rifampin  Vancomycin    R.O.S.  10 systems reviewed and all negative except for what is listed in HPI    ON Exam  VS:  B.P. 140/73  Afebrile  HR 78  SpO2: 97%    General: 75 y/o female in no distress    HEENT: sclera anicteric  Neck: supple without masses  Chest: Lungs clear  Heart: regular R & R  Abdomen: mild tenderness to deep palpation LLQ, no masses, no hernias, no guarding or rebound  Rectal/pelvic: deferred  Ext: No C/C/E. AV shunt left upper arm with strong thrill and bruit.   Neuro: grossly intact    Lab:    No leukocytosis  H/H 9.7/30.2  Lytes noted  Creat 5.67  Lipase 682 yesterday    Imaging:  CT abdomen reviewed: mild diverticulitis left colon with diffuse diverticuli  Possible subtle changes of pancreatitis. No abscess or free air    Impression:  Resolving uncomplicated diverticulitis  Doubt acute pancreatitis with lipase only in 600 rng and suspect CT findings may be chronic as has history recurrent pancreatitis  S/P cholecystectomy by history and RUQ ultrasound with no dilitation of CBD so not a likely cause of her recurrent pancreatitis. Rec: Advance diet and watch today  When discharged, would continue po cipro and flagyl  Needs fiber supplement malathi  F/U with me prn. Rec GI consult as out patient.

## 2022-08-30 NOTE — PROGRESS NOTES
HOSPITALIST PROGRESS NOTE  Ric Leon MD, [address]          Daily Progress Note: 8/30/2022      Subjective:     Patient is alert and oriented x4. Patient got significantly better sleep overnight and nausea and vomiting is significant proved. Abdominal pain has also improved and patient willing to try and advance to a regular diet at this time. No overnight fever/chills, chest pain, shortness of breath, hypoxia noted. No significant diarrhea, melena, hematochezia noted. Consulted with general surgery and recommendation to advance diet as tolerated and prepare for possible discharge in next 24 to 48 hours, with Cipro and Flagyl for total of 14 days.       Medications reviewed  Current Facility-Administered Medications   Medication Dose Route Frequency    psyllium (METAMUCIL) (sugar free) packet 1 Packet  1 Packet Oral DAILY    0.9% sodium chloride infusion  25 mL/hr IntraVENous DIALYSIS PRN    mometasone-formoterol (DULERA) 200mcg-5mcg/puff  2 Puff Inhalation BID RT    tiotropium bromide (SPIRIVA RESPIMAT) 2.5 mcg /actuation  2 Puff Inhalation DAILY    HYDROmorphone (DILAUDID) tablet 1 mg  1 mg Oral Q6H PRN    [START ON 8/31/2022] levoFLOXacin (LEVAQUIN) tablet 250 mg  250 mg Oral Q48H    metroNIDAZOLE (FLAGYL) tablet 500 mg  500 mg Oral Q12H    HYDROmorphone (DILAUDID) injection 0.5 mg  0.5 mg IntraVENous Q6H PRN    0.9% sodium chloride infusion  50 mL/hr IntraVENous CONTINUOUS    sodium chloride (NS) flush 5-40 mL  5-40 mL IntraVENous Q8H    sodium chloride (NS) flush 5-40 mL  5-40 mL IntraVENous PRN    acetaminophen (TYLENOL) tablet 650 mg  650 mg Oral Q6H PRN    Or    acetaminophen (TYLENOL) suppository 650 mg  650 mg Rectal Q6H PRN    ondansetron (ZOFRAN ODT) tablet 4 mg  4 mg Oral Q8H PRN    Or    ondansetron (ZOFRAN) injection 4 mg  4 mg IntraVENous Q6H PRN    amiodarone (CORDARONE) tablet 200 mg  200 mg Oral DAILY amLODIPine (NORVASC) tablet 10 mg  10 mg Oral DAILY    apixaban (ELIQUIS) tablet 2.5 mg  2.5 mg Oral BID    carvediloL (COREG) tablet 25 mg  25 mg Oral BID WITH MEALS    dicyclomine (BENTYL) capsule 10 mg  10 mg Oral DAILY    sevelamer carbonate (RENVELA) tab 800 mg  800 mg Oral TID    atorvastatin (LIPITOR) tablet 20 mg  20 mg Oral QHS    losartan (COZAAR) tablet 50 mg  50 mg Oral DAILY    levothyroxine (SYNTHROID) tablet 75 mcg  75 mcg Oral ACB    fluticasone propionate (FLONASE) 50 mcg/actuation nasal spray 1 Spray  1 Spray Both Nostrils DAILY    montelukast (SINGULAIR) tablet 10 mg  10 mg Oral DAILY    predniSONE (DELTASONE) tablet 5 mg  5 mg Oral DAILY    pantoprazole (PROTONIX) tablet 40 mg  40 mg Oral DAILY    sucralfate (CARAFATE) tablet 1 g  1 g Oral QID    valGANciclovir (VALCYTE) tablet 900 mg - patient supplied (Patient Supplied)  900 mg Oral DAILY       Review of Systems:   A comprehensive review of systems was negative except for that written in the HPI. Objective:   Physical Exam:     Visit Vitals  BP (!) 115/50 (BP 1 Location: Right upper arm, BP Patient Position: At rest)   Pulse 65   Temp 97.9 °F (36.6 °C)   Resp 18   Ht 5' 1\" (1.549 m)   Wt 70.3 kg (155 lb)   SpO2 98%   BMI 29.29 kg/m²      O2 Device: None (Room air)  Patient Vitals for the past 8 hrs:   Temp Pulse Resp BP SpO2   22 0816 97.9 °F (36.6 °C) 65 18 (!) 115/50 98 %          Temp (24hrs), Av.6 °F (36.4 °C), Min:97.3 °F (36.3 °C), Max:97.9 °F (36.6 °C)    No intake/output data recorded.  1901 -  0700  In: 1952.5 [P.O.:1160; I.V.:792.5]  Out: 1200 [Urine:200]    General:  Alert, cooperative, no distress, appears stated age. Lungs:   Clear to auscultation bilaterally. Chest wall:  No tenderness or deformity. Heart:  Regular rate and rhythm, S1, S2 normal, no murmur, click, rub or gallop.    Abdomen:   Soft, mildly distended with thickened tenderness to palpation of the right upper quadrant and periumbilical area with hypoactive bowel sounds. No masses,  No organomegaly. Extremities: Extremities normal, atraumatic, no cyanosis or edema. Pulses: 2+ and symmetric all extremities. Skin: Skin color, texture, turgor normal. No rashes or lesions   Neurologic: No gross sensory or motor deficits     Data Review:       Recent Days:  Recent Labs     08/30/22  1015 08/29/22  0400 08/28/22  1014   WBC 4.5* 6.2 7.1   HGB 9.7* 10.0* 11.6*   HCT 30.2* 31.6* 36.4    170 193     Recent Labs     08/30/22  1015 08/29/22  0400 08/28/22  1120   * 135* 136   K 3.4* 4.2 4.0   CL 92* 101 99*   CO2 29 25 29   * 68* 72*   BUN 21* 31* 28*   CREA 5.67* 8.23* 7.37*   CA 7.5* 8.0* 8.2*   MG  --  2.1  --    PHOS  --  4.3 4.3   ALB 2.7* 2.9* 3.1*   TBILI 0.4 0.5 0.4   ALT 28 45 34     No results for input(s): PH, PCO2, PO2, HCO3, FIO2 in the last 72 hours. 24 Hour Results:  Recent Results (from the past 24 hour(s))   CBC WITH AUTOMATED DIFF    Collection Time: 08/30/22 10:15 AM   Result Value Ref Range    WBC 4.5 (L) 4.6 - 13.2 K/uL    RBC 3.02 (L) 4.20 - 5.30 M/uL    HGB 9.7 (L) 12.0 - 16.0 g/dL    HCT 30.2 (L) 35.0 - 45.0 %    .0 78.0 - 100.0 FL    MCH 32.1 24.0 - 34.0 PG    MCHC 32.1 31.0 - 37.0 g/dL    RDW 16.5 (H) 11.6 - 14.5 %    PLATELET 625 702 - 646 K/uL    MPV 9.2 9.2 - 11.8 FL    NRBC 0.0 0.0  WBC    ABSOLUTE NRBC 0.00 0.00 - 0.01 K/uL    NEUTROPHILS 61 40 - 73 %    LYMPHOCYTES 29 21 - 52 %    MONOCYTES 9 3 - 10 %    EOSINOPHILS 1 0 - 5 %    BASOPHILS 0 0 - 2 %    IMMATURE GRANULOCYTES 0 0 - 0.5 %    ABS. NEUTROPHILS 2.7 1.8 - 8.0 K/UL    ABS. LYMPHOCYTES 1.3 0.9 - 3.6 K/UL    ABS. MONOCYTES 0.4 0.05 - 1.2 K/UL    ABS. EOSINOPHILS 0.0 0.0 - 0.4 K/UL    ABS. BASOPHILS 0.0 0.0 - 0.1 K/UL    ABS. IMM.  GRANS. 0.0 0.00 - 0.04 K/UL    DF AUTOMATED     METABOLIC PANEL, COMPREHENSIVE    Collection Time: 08/30/22 10:15 AM   Result Value Ref Range    Sodium 132 (L) 136 - 145 mmol/L    Potassium 3.4 (L) 3.5 - 5.5 mmol/L    Chloride 92 (L) 100 - 111 mmol/L    CO2 29 21 - 32 mmol/L    Anion gap 11 3.0 - 18.0 mmol/L    Glucose 180 (H) 74 - 99 mg/dL    BUN 21 (H) 7 - 18 mg/dL    Creatinine 5.67 (H) 0.60 - 1.30 mg/dL    BUN/Creatinine ratio 4 (L) 12 - 20      GFR est AA 9 (L) >60 ml/min/1.73m2    GFR est non-AA 8 (L) >60 ml/min/1.73m2    Calcium 7.5 (L) 8.5 - 10.1 mg/dL    Bilirubin, total 0.4 0.2 - 1.0 mg/dL    AST (SGOT) 17 10 - 38 U/L    ALT (SGPT) 28 13 - 56 U/L    Alk. phosphatase 97 45 - 117 U/L    Protein, total 5.8 (L) 6.4 - 8.2 g/dL    Albumin 2.7 (L) 3.4 - 5.0 g/dL    Globulin 3.1 2.0 - 4.0 g/dL    A-G Ratio 0.9 0.8 - 1.7             Assessment/Plan:     Acute Diverticulitis  Complained of having an low grade fever, no noted leukocytosis and afebrile, symptomatic treatment for now. Clear liquids. CT abd: Mild sigmoid diverticulitis without evidence for abscess or perforation. Per general surgery patient's elevated lipase likely secondary to diverticulitis and not due to acute pancreatitis. Pain management and IV fluids with NS. Supportive care with Dilaudid and antiemetics. CT abdomen: Mild stranding along the pancreatic tail may reflect pancreatitis. Probable pancreatic pseudocysts. Status post cholecystectomy. Advance to regular diet today. Transition IV Levaquin and Flagyl to p.o. antibiotics in preparation for discharge over the next 24 to 48 hours. Discharged home on Cipro and Flagyl x14 days total.     Chronic constipation  Added Metamucil per general surgery. UTI  Patient does not have UTI based on urinalysis. No dysuria, increased frequency or other symptoms of UTI noted. Essential hypertension  Continue Coreg, Norvasc, Cozaar. Hyperlipidemia  Continue statin. Atrial fibrillation ([address]  Continue Coreg and Eliquis     ESRD (end stage renal disease) on dialysis [facility])  Nephrologist consulted to continue HS on Monday and Friday.      Hypothyroidism  Continue Synthroid     GERD   Continue Dexilant    Code Status: Full     Prophylaxis  Eliquis       Care Plan discussed with: Patient/Family, Nurse, and     Total time spent with patient: 30 minutes. With greater than 50% spent in coordination of care and counseling.     Jose De Jesus Islas MD

## 2022-08-31 VITALS
WEIGHT: 156.4 LBS | RESPIRATION RATE: 16 BRPM | SYSTOLIC BLOOD PRESSURE: 133 MMHG | BODY MASS INDEX: 29.53 KG/M2 | HEART RATE: 59 BPM | HEIGHT: 61 IN | TEMPERATURE: 97.3 F | OXYGEN SATURATION: 98 % | DIASTOLIC BLOOD PRESSURE: 61 MMHG

## 2022-08-31 LAB
ALBUMIN SERPL-MCNC: 2.7 G/DL (ref 3.4–5)
ALBUMIN/GLOB SERPL: 0.8 {RATIO} (ref 0.8–1.7)
ALP SERPL-CCNC: 93 U/L (ref 45–117)
ALT SERPL-CCNC: 23 U/L (ref 13–56)
ANION GAP SERPL CALC-SCNC: 8 MMOL/L (ref 3–18)
AST SERPL W P-5'-P-CCNC: 9 U/L (ref 10–38)
BASOPHILS # BLD: 0 K/UL (ref 0–0.1)
BASOPHILS NFR BLD: 0 % (ref 0–2)
BILIRUB SERPL-MCNC: 0.4 MG/DL (ref 0.2–1)
BUN SERPL-MCNC: 33 MG/DL (ref 7–18)
BUN/CREAT SERPL: 5 (ref 12–20)
CA-I BLD-MCNC: 7.7 MG/DL (ref 8.5–10.1)
CHLORIDE SERPL-SCNC: 94 MMOL/L (ref 100–111)
CO2 SERPL-SCNC: 28 MMOL/L (ref 21–32)
CREAT SERPL-MCNC: 6.93 MG/DL (ref 0.6–1.3)
DIFFERENTIAL METHOD BLD: ABNORMAL
EOSINOPHIL # BLD: 0.1 K/UL (ref 0–0.4)
EOSINOPHIL NFR BLD: 1 % (ref 0–5)
ERYTHROCYTE [DISTWIDTH] IN BLOOD BY AUTOMATED COUNT: 16 % (ref 11.6–14.5)
GLOBULIN SER CALC-MCNC: 3.2 G/DL (ref 2–4)
GLUCOSE SERPL-MCNC: 137 MG/DL (ref 74–99)
HCT VFR BLD AUTO: 28.8 % (ref 35–45)
HGB BLD-MCNC: 9.3 G/DL (ref 12–16)
IMM GRANULOCYTES # BLD AUTO: 0 K/UL (ref 0–0.04)
IMM GRANULOCYTES NFR BLD AUTO: 1 % (ref 0–0.5)
LYMPHOCYTES # BLD: 1.6 K/UL (ref 0.9–3.6)
LYMPHOCYTES NFR BLD: 29 % (ref 21–52)
MCH RBC QN AUTO: 31.1 PG (ref 24–34)
MCHC RBC AUTO-ENTMCNC: 32.3 G/DL (ref 31–37)
MCV RBC AUTO: 96.3 FL (ref 78–100)
MONOCYTES # BLD: 0.5 K/UL (ref 0.05–1.2)
MONOCYTES NFR BLD: 9 % (ref 3–10)
NEUTS SEG # BLD: 3.3 K/UL (ref 1.8–8)
NEUTS SEG NFR BLD: 60 % (ref 40–73)
NRBC # BLD: 0 K/UL (ref 0–0.01)
NRBC BLD-RTO: 0 PER 100 WBC
PLATELET # BLD AUTO: 164 K/UL (ref 135–420)
PMV BLD AUTO: 8.8 FL (ref 9.2–11.8)
POTASSIUM SERPL-SCNC: 3.7 MMOL/L (ref 3.5–5.5)
PROT SERPL-MCNC: 5.9 G/DL (ref 6.4–8.2)
RBC # BLD AUTO: 2.99 M/UL (ref 4.2–5.3)
SODIUM SERPL-SCNC: 130 MMOL/L (ref 136–145)
WBC # BLD AUTO: 5.4 K/UL (ref 4.6–13.2)

## 2022-08-31 PROCEDURE — 74011250637 HC RX REV CODE- 250/637: Performed by: NURSE PRACTITIONER

## 2022-08-31 PROCEDURE — 74011636637 HC RX REV CODE- 636/637: Performed by: NURSE PRACTITIONER

## 2022-08-31 PROCEDURE — 94640 AIRWAY INHALATION TREATMENT: CPT

## 2022-08-31 PROCEDURE — 36415 COLL VENOUS BLD VENIPUNCTURE: CPT

## 2022-08-31 PROCEDURE — 85025 COMPLETE CBC W/AUTO DIFF WBC: CPT

## 2022-08-31 PROCEDURE — 94761 N-INVAS EAR/PLS OXIMETRY MLT: CPT

## 2022-08-31 PROCEDURE — 99231 SBSQ HOSP IP/OBS SF/LOW 25: CPT | Performed by: SURGERY

## 2022-08-31 PROCEDURE — 74011250637 HC RX REV CODE- 250/637: Performed by: INTERNAL MEDICINE

## 2022-08-31 PROCEDURE — 90935 HEMODIALYSIS ONE EVALUATION: CPT

## 2022-08-31 PROCEDURE — 80053 COMPREHEN METABOLIC PANEL: CPT

## 2022-08-31 RX ORDER — CIPROFLOXACIN 500 MG/1
500 TABLET ORAL DAILY
Qty: 7 TABLET | Refills: 0 | Status: SHIPPED | OUTPATIENT
Start: 2022-08-31 | End: 2022-09-07

## 2022-08-31 RX ORDER — METRONIDAZOLE 500 MG/1
500 TABLET ORAL EVERY 12 HOURS
Qty: 7 TABLET | Refills: 0 | Status: SHIPPED | OUTPATIENT
Start: 2022-08-31 | End: 2022-09-04

## 2022-08-31 RX ADMIN — MONTELUKAST 10 MG: 10 TABLET, FILM COATED ORAL at 09:00

## 2022-08-31 RX ADMIN — PANTOPRAZOLE SODIUM 40 MG: 40 TABLET, DELAYED RELEASE ORAL at 08:59

## 2022-08-31 RX ADMIN — LEVOTHYROXINE SODIUM 75 MCG: 0.07 TABLET ORAL at 06:38

## 2022-08-31 RX ADMIN — PREDNISONE 5 MG: 5 TABLET ORAL at 08:59

## 2022-08-31 RX ADMIN — CARVEDILOL 25 MG: 12.5 TABLET, FILM COATED ORAL at 08:59

## 2022-08-31 RX ADMIN — ACETAMINOPHEN 650 MG: 325 TABLET ORAL at 00:20

## 2022-08-31 RX ADMIN — APIXABAN 2.5 MG: 2.5 TABLET, FILM COATED ORAL at 08:59

## 2022-08-31 RX ADMIN — SUCRALFATE 1 G: 1 TABLET ORAL at 08:59

## 2022-08-31 RX ADMIN — SEVELAMER CARBONATE 800 MG: 800 TABLET, FILM COATED ORAL at 08:59

## 2022-08-31 RX ADMIN — MOMETASONE FUROATE AND FORMOTEROL FUMARATE DIHYDRATE 2 PUFF: 200; 5 AEROSOL RESPIRATORY (INHALATION) at 08:00

## 2022-08-31 RX ADMIN — AMIODARONE HYDROCHLORIDE 200 MG: 200 TABLET ORAL at 08:59

## 2022-08-31 RX ADMIN — AMLODIPINE BESYLATE 10 MG: 5 TABLET ORAL at 08:59

## 2022-08-31 RX ADMIN — TIOTROPIUM BROMIDE INHALATION SPRAY 2 PUFF: 3.12 SPRAY, METERED RESPIRATORY (INHALATION) at 07:49

## 2022-08-31 RX ADMIN — LOSARTAN POTASSIUM 50 MG: 25 TABLET, FILM COATED ORAL at 08:59

## 2022-08-31 RX ADMIN — METRONIDAZOLE 500 MG: 250 TABLET ORAL at 08:59

## 2022-08-31 RX ADMIN — DICYCLOMINE HYDROCHLORIDE 10 MG: 10 CAPSULE ORAL at 09:00

## 2022-08-31 NOTE — PROGRESS NOTES
Surgery Progress note      Subjective:  States didn't feel well yesterday and got Dilaudid for pain. Feels better this A.M. Tolerating regular diet without increase in pain or nausea. Due for dialysis today. Objective:  Afebrile, VSS  In No distress. Looks comfortable  Abdomen: Minimal if any LLQ tenderness. Abdomen is soft. No guarding or rebound tenderness,    Impression:  Resolving uncomplicated diverticulitis. Doubt recurrent pancreatitis. Rec:  Discharge on P.O. antibiotic, high fiber diet and 30 gm metamucil daily. Follow up:  Suggest G. I. consult as outpatient.

## 2022-08-31 NOTE — PROGRESS NOTES
Comprehensive Nutrition Assessment    Type and Reason for Visit: reassessment    Nutrition Recommendations/Plan:   Regular high fiber diet     Malnutrition Assessment:  Malnutrition Status: At risk for malnutrition (specify) (NPO for diveticulitis and pancreatitis and N/V/Dx 3 days) (08/29/22 1147)    Context:  Acute illness     Findings of the 6 clinical characteristics of malnutrition:   Energy Intake:  Mild decrease in energy intake (specify) (NPO)  Weight Loss:  No significant weight loss     Body Fat Loss:  No significant body fat loss,     Muscle Mass Loss:  No significant muscle mass loss,    Fluid Accumulation:  No significant fluid accumulation,     Strength:  Not performed     Nutrition Assessment:    75 yo female PMH: HTN, ESRD with HD, hypothryoidism, A-fib, HLD    Nutrition Related Findings:    Overweight BMI 28.7 at 152 lbs pt admitted due to abdominal pain to LLQ and LRQ on Friday during HD. Pt also complians of N/V/D weakness. CT of abdomen showed diverticulitis and pancreatitis. made NPO started on IVF, IV ABx and pain management. Wound Type: None    8/31/2022: pt started on regular diet yesterday tolerated. Being discharged today on PO Abx, 30 mg metamucil daily and high fiber diet. Will follow up with GI as an outpatient. Recent Results (from the past 24 hour(s))   CBC WITH AUTOMATED DIFF    Collection Time: 08/31/22  4:54 AM   Result Value Ref Range    WBC 5.4 4.6 - 13.2 K/uL    RBC 2.99 (L) 4.20 - 5.30 M/uL    HGB 9.3 (L) 12.0 - 16.0 g/dL    HCT 28.8 (L) 35.0 - 45.0 %    MCV 96.3 78.0 - 100.0 FL    MCH 31.1 24.0 - 34.0 PG    MCHC 32.3 31.0 - 37.0 g/dL    RDW 16.0 (H) 11.6 - 14.5 %    PLATELET 382 569 - 877 K/uL    MPV 8.8 (L) 9.2 - 11.8 FL    NRBC 0.0 0.0  WBC    ABSOLUTE NRBC 0.00 0.00 - 0.01 K/uL    NEUTROPHILS 60 40 - 73 %    LYMPHOCYTES 29 21 - 52 %    MONOCYTES 9 3 - 10 %    EOSINOPHILS 1 0 - 5 %    BASOPHILS 0 0 - 2 %    IMMATURE GRANULOCYTES 1 (H) 0 - 0.5 %    ABS. NEUTROPHILS 3.3 1.8 - 8.0 K/UL    ABS. LYMPHOCYTES 1.6 0.9 - 3.6 K/UL    ABS. MONOCYTES 0.5 0.05 - 1.2 K/UL    ABS. EOSINOPHILS 0.1 0.0 - 0.4 K/UL    ABS. BASOPHILS 0.0 0.0 - 0.1 K/UL    ABS. IMM. GRANS. 0.0 0.00 - 0.04 K/UL    DF AUTOMATED     METABOLIC PANEL, COMPREHENSIVE    Collection Time: 08/31/22  4:54 AM   Result Value Ref Range    Sodium 130 (L) 136 - 145 mmol/L    Potassium 3.7 3.5 - 5.5 mmol/L    Chloride 94 (L) 100 - 111 mmol/L    CO2 28 21 - 32 mmol/L    Anion gap 8 3.0 - 18.0 mmol/L    Glucose 137 (H) 74 - 99 mg/dL    BUN 33 (H) 7 - 18 mg/dL    Creatinine 6.93 (H) 0.60 - 1.30 mg/dL    BUN/Creatinine ratio 5 (L) 12 - 20      GFR est AA 7 (L) >60 ml/min/1.73m2    GFR est non-AA 6 (L) >60 ml/min/1.73m2    Calcium 7.7 (L) 8.5 - 10.1 mg/dL    Bilirubin, total 0.4 0.2 - 1.0 mg/dL    AST (SGOT) 9 (L) 10 - 38 U/L    ALT (SGPT) 23 13 - 56 U/L    Alk. phosphatase 93 45 - 117 U/L    Protein, total 5.9 (L) 6.4 - 8.2 g/dL    Albumin 2.7 (L) 3.4 - 5.0 g/dL    Globulin 3.2 2.0 - 4.0 g/dL    A-G Ratio 0.8 0.8 - 1.7          Current Nutrition Intake & Therapies:  Average Meal Intake: NPO  Average Supplement Intake: NPO  ADULT DIET Regular    Anthropometric Measures:  Height: 5' 1\" (154.9 cm)  Ideal Body Weight (IBW): 105 lbs (48 kg)  Admission Body Weight: 152 lb  Current Body Wt:  68.9 kg (152 lb), 144.8 % IBW. Bed scale  Current BMI (kg/m2): 28.7        Weight Adjustment: No adjustment                 BMI Category: Overweight (BMI 25.0-29. 9)    Estimated Daily Nutrient Needs:  Energy Requirements Based On: Kcal/kg (20-25 kcal/kg)  Weight Used for Energy Requirements: Admission (69- kg)  Energy (kcal/day): 0468-2825 kcal/day  Weight Used for Protein Requirements: Admission (0.8-1 g/kg)  Protein (g/day): 55-69 g/day  Method Used for Fluid Requirements: 1 ml/kcal  Fluid (ml/day): 0855-8115 mL/day    Nutrition Diagnosis:   Predicted inadequate energy intake related to altered GI function as evidenced by NPO or clear liquid status due to medical condition    Nutrition Interventions:   Food and/or Nutrient Delivery: Continue NPO  Nutrition Education/Counseling: Education not appropriate  Coordination of Nutrition Care: Continue to monitor while inpatient       Goals:     Goals: Initiate PO diet, by next RD assessment       Nutrition Monitoring and Evaluation:      Food/Nutrient Intake Outcomes: Food and nutrient intake, Diet advancement/tolerance  Physical Signs/Symptoms Outcomes: Meal time behavior, GI status, Weight, Nutrition focused physical findings        Discharge Planning:    Regular diet    [address]: [place] [phone]

## 2022-08-31 NOTE — DISCHARGE SUMMARY
Discharge Summary     Patient: Merline Medina MRN: [id number]  SSN: [id number]    YOB: 1955  Age: 74 y.o. Sex: female       Admit Date: 8/28/2022    Discharge Date: 8/31/2022      Admission Diagnoses: Pancreatitis [K85.90]; Diverticulitis [K57.92]    Discharge Diagnoses:   Problem List as of 8/31/2022 Date Reviewed: 5/26/2022            Codes Class Noted - Resolved    Diverticulitis ICD-10-CM: M21.96  ICD-9-CM: 562.11  8/28/2022 - Present        Pancreatitis ICD-10-CM: K85.90  ICD-9-CM: 114.2  8/28/2022 - Present        Segmental colitis associated with diverticulosis ([facility] ICD-10-CM: K50.10, K57.30  ICD-9-CM: 555.1, 562.10  7/19/2022 - Present        Stenosis of left vertebral artery ICD-10-CM: I65.02  ICD-9-CM: 433.20  4/12/2022 - Present        Nausea ICD-10-CM: R11.0  ICD-9-CM: 787.02  4/7/2022 - Present        Weakness ICD-10-CM: R53.1  ICD-9-CM: 780.79  4/7/2022 - Present        Fluid overload ICD-10-CM: E87.70  ICD-9-CM: 276.69  2/2/2022 - Present        Atypical chest pain ICD-10-CM: R07.89  ICD-9-CM: 786.59  12/24/2021 - Present        ESRD needing dialysis ([facility] ICD-10-CM: N18.6, Z99.2  ICD-9-CM: 585.6  12/24/2021 - Present        Mild persistent asthma with (acute) exacerbation ICD-10-CM: J45.31  ICD-9-CM: 493.92  12/24/2021 - Present        Intractable vomiting ICD-10-CM: R11.10  ICD-9-CM: 536.2  8/9/2021 - Present        Left leg pain ICD-10-CM: M79.605  ICD-9-CM: 729.5  7/14/2021 - Present        Acute hyperkalemia ICD-10-CM: E87.5  ICD-9-CM: 276.7  6/12/2021 - Present        Encounter for cholecystectomy ICD-10-CM: Z76.89  ICD-9-CM: V65.8  5/6/2021 - Present    Overview Signed 5/6/2021  9:13 AM by Sudha Ashton MD     7/2020             Acute left-sided low back pain without sciatica ICD-10-CM: M54.50  ICD-9-CM: 724.2  5/6/2021 - Present        Atrial fibrillation ([facility] ICD-10-CM: I48.91  ICD-9-CM: 427.31  5/6/2021 - Present        Chronic anticoagulation ICD-10-CM: Z79.01  ICD-9-CM: V58.61  5/6/2021 - Present        Stomatitis ICD-10-CM: K12.1  ICD-9-CM: 528.00  3/2/2021 - Present        Thrush of mouth and esophagus (HCC) ICD-10-CM: B37.81, B37.0  ICD-9-CM: 112.84, 112.0  3/2/2021 - Present        ESRD (end stage renal disease) on dialysis [facility]) ICD-10-CM: N18.6, Z99.2  ICD-9-CM: 585.6, V45.11  12/28/2020 - Present        History of DVT (deep vein thrombosis) ICD-10-CM: P55.382  ICD-9-CM: V12.51  12/28/2020 - Present        Hypothyroidism ICD-10-CM: E03.9  ICD-9-CM: 244.9  12/24/2020 - Present        Vertigo ICD-10-CM: R42  ICD-9-CM: 780.4  12/24/2020 - Present        Calculus of gallbladder with acute cholecystitis without obstruction ICD-10-CM: K80.00  ICD-9-CM: 574.00  10/9/2020 - Present        Acute recurrent maxillary sinusitis ICD-10-CM: J01.01  ICD-9-CM: 461.0  8/6/2020 - Present        Ulcer ICD-10-CM: [id number]  ICD-9-CM: 707.9  Unknown - Present        Obesity ICD-10-CM: E66.9  ICD-9-CM: 278.00  Unknown - Present        Migraine ICD-10-CM: W27.901  ICD-9-CM: 346.90  Unknown - Present        Hypertension ICD-10-CM: I10  ICD-9-CM: 401.9  Unknown - Present        Hypercholesteremia ICD-10-CM: E78.00  ICD-9-CM: 272.0  Unknown - Present        GERD (gastroesophageal reflux disease) ICD-10-CM: K21.9  ICD-9-CM: 530.81  Unknown - Present        Burning with urination ICD-10-CM: R30.0  ICD-9-CM: 788.1  Unknown - Present    Overview Signed 2/13/2017 12:02 PM by Ajit Lyn A     frequent uti             Arrhythmia ICD-10-CM: I49.9  ICD-9-CM: 427.9  Unknown - Present        Hyperkalemia ICD-10-CM: E87.5  ICD-9-CM: 276.7  11/13/2016 - Present        Pruritus ICD-10-CM: L29.9  ICD-9-CM: 698.9  9/29/2016 - Present        Chronic kidney disease, stage III (moderate) (HCC) ICD-10-CM: N18.30  ICD-9-CM: 585.3  9/27/2016 - Present        Recurrent urinary tract infection ICD-10-CM: N39.0  ICD-9-CM: 599.0  9/27/2016 - Present        Nausea and vomiting ICD-10-CM: R11.2  ICD-9-CM: 787.01  4/10/2016 - Present        Diverticulitis of intestine ICD-10-CM: K57.92  ICD-9-CM: 562.11  4/2/2016 - Present        Cystic disease of kidney ICD-10-CM: Q61.9  ICD-9-CM: 753.10  3/16/2015 - Present        Gastritis and duodenitis ICD-10-CM: K29.90  ICD-9-CM: 535.50  2/23/2015 - Present        Anemia ICD-10-CM: D64.9  ICD-9-CM: 285.9  11/10/2014 - Present        Disease due to gram-negative bacillus ICD-10-CM: A49.9  ICD-9-CM: 041.85  10/17/2014 - Present    Overview Signed 2/13/2017 11:58 AM by Leatha LLANOS     Overview:   Acinetobacter baumannii Septicemia and UTI 10/14/2014 (cultures at [facility])             Fever ICD-10-CM: R50.9  ICD-9-CM: 780.60  10/14/2014 - Present        Drug-induced hyperglycemia ICD-10-CM: R73.9, T50.905A  ICD-9-CM: 790.29, E947.9  6/28/2014 - Present        Exacerbation of asthma ICD-10-CM: J45.901  ICD-9-CM: 493.92  6/25/2014 - Present        Systemic inflammatory response syndrome (SIRS) (HCC) ICD-10-CM: R65.10  ICD-9-CM: 995.90  6/23/2014 - Present        Kidney transplant rejection ICD-10-CM: T86.11  ICD-9-CM: 996.81  4/10/2014 - Present    Overview Signed 2/13/2017 11:58 AM by Armond Fry     Overview:   Collected: Ailin Lao Dr: Neelam Romano MD    Case Number: [id number]  [address] [place] [address]    Case Number: [id number]    DIAGNOSIS:  ----------  ALLOGRAFT KIDNEY, NEEDLE BIOPSY (14 YEARS, 2 MONTHS):  - SUBOPTIMAL SPECIMEN: RENAL MEDULLA, INCLUDING DEEP MEDULLA WITH  FOCAL BENIGN UROTHELIAL EPITHELIUM. - MILD NEUTROPHILIC TUBULITIS WITH INTRALUMINAL NEUTROPHILS,  CORRELATE WITH URINE CULTURE TO RULE OUT BACTERIAL INFECTION. - MILD LYMPHOCYTIC TUBULITIS CONSISTENT WITH BORDERLINE CHANGE:  (\"SUSPICIOUS\" FOR ACUTE CELLULAR REJECTION) AS PER BANFF SCHEMA. - CONGESTED PERITUBULAR CAPILLARIES (NON-SPECIFIC), BUT RENAL  VEIN THROMBOSIS OR STENOSIS SHOULD BE EXCLUDED CLINICALLY.   - FOCAL SMALL INTERSTITIAL COLLECTION OF CAST MATERIAL  (NON-SPECIFIC), BUT  URINARY OBSTRUCTION SHOULD BE EXCLUDED CLINICALLY. - NO DEFINITIVE STAINING FOR POLYOMAVIRUSES (SEE DESCRIPTION). - FOCAL C4D REACTIVITY ALONG PERITUBULAR CAPILLARY WALLS WITH  HIGH NON-SPECIFIC BACKGROUND STAINING; SIGNIFICANCE UNCERTAIN AND  CORRELATION WITH FLOW PRA IS SUGGESTED TO EXCLUDE EARLY HUMORAL  REJECTION. - SMALL VESSEL SCLEROSIS, MILD TO MODERATE TO SEVERE.  - TUBULAR ATROPHY AND INTERSTITIAL FIBROSIS CANNOT BE ADEQUATELY  ASSESSED  IN THE ABSENCE OF RENAL CORTEX. Re-Bx - Collected: Eugenia Mccain Dr: Treasure Adler MD    Case Number: [id number]  [address] [place] [address]    Case Number: [id number]    DIAGNOSIS:  ----------  ALLOGRAFT KIDNEY, NEEDLE BIOPSY (14 YEARS, 2 MONTHS):  - BORDERLINE CHANGE (\"SUSPICIOUS\" FOR ACUTE CELLULAR REJECTION)  TO FOCAL  MILD ACUTE CELLULAR REJECTION (BANFF TYPE 1A) (SEE COMMENT). - MILD NEUTROPHILIC INTERSTITIAL INFLAMMATION, CORRELATE WITH  URINE CULTURE  TO RULE OUT SUPERIMPOSED BACTERIAL INFECTION. - SMALL INTERSTITIAL COLLECTIONS OF PAS-POSITIVE CAST MATERIAL  AND CYSTICALLY DILATED APCKER'S SPACE WITH PAS-POSITIVE  PROTEINACEOUS FILTRATE (SEE  COMMENT). - ACUTE ISCHEMIC-TYPE TUBULAR INJURY IS NOTED.  - FOCAL C4D REACTIVITY ALONG PERITUBULAR CAPILLARY WALLS;  SIGNIFICANCE  UNCERTAIN AND CORRELATION WITH FLOW PRA IS SUGGESTED TO EXCLUDE  EARLY  HUMORAL REJECTION. - NEGATIVE IMMUNOSTAIN FOR POLYOMAVIRUSES (BK, HOMER). - CHRONIC CHANGES: GLOBAL SCLEROSIS IN APPROXIMATELY 14 OUT OF 60  (23%)  GLOMERULI, FOCAL SEGMENTAL GLOMERULOSCLEROSIS IN APPROXIMATELY  2 OUT OF  46 (4%) NON-OBSOLESCENT GLOMERULI, MODERATE TO SEVERE  ARTERIOSCLEROSIS,  MILD TO MODERATE TO SEVERE ARTERIOLAR HYALINE SCLEROSIS, AND  MODERATE  INTERSTITIAL FIBROSIS/TUBULAR ATROPHY (SEE COMMENT).              Acute renal failure [facility]) ICD-10-CM: N17.9  ICD-9-CM: 584.9  4/7/2014 - Present        Renal transplant disorder ICD-10-CM: T86.10  ICD-9-CM: 996.81  4/7/2014 - Present        Systemic infection ([facility] ICD-10-CM: A41.9  ICD-9-CM: 038.9  1/7/2014 - Present        Asthma ICD-10-CM: J45.909  ICD-9-CM: 493.90  8/21/2013 - Present        Diverticular disease of large intestine ICD-10-CM: K57.30  ICD-9-CM: 562.10  8/21/2013 - Present        Essential hypertension ICD-10-CM: I10  ICD-9-CM: 401.9  8/21/2013 - Present        Seasonal allergic rhinitis due to pollen ICD-10-CM: J30.1  ICD-9-CM: 477.0  8/21/2013 - Present        Hyperlipidemia ICD-10-CM: E78.5  ICD-9-CM: 272.4  8/21/2013 - Present        UTI (urinary tract infection) ICD-10-CM: N39.0  ICD-9-CM: 599.0  8/21/2013 - Present        Fecal incontinence ICD-10-CM: R15.9  ICD-9-CM: 787.60  4/17/2013 - Present        History of kidney transplant ICD-10-CM: Z94.0  ICD-9-CM: V42.0  4/30/2012 - Present        CMV (cytomegalovirus) antibody positive ICD-10-CM: R76.8  ICD-9-CM: 795.79  4/30/2012 - Present    Overview Signed 2/13/2017 11:58 AM by Kayley LLANOS     Overview:   Donor negative             Hematuria ICD-10-CM: R31.9  ICD-9-CM: 599.70  4/30/2012 - Present        Migraine without status migrainosus, not intractable ICD-10-CM: I39.256  ICD-9-CM: 346.90  2/24/2010 - Present        Renal cyst ICD-10-CM: N28.1  ICD-9-CM: 753.10  1/1/2002 - Present    Overview Signed 2/13/2017 12:01 PM by Andria Hadley     kidney transplant              RESOLVED: Chronic obstructive pulmonary disease ([facility] ICD-10-CM: J44.9  ICD-9-CM: 712  8/21/2013 - 1/20/2022        RESOLVED: Gastroesophageal reflux disease ICD-10-CM: K21.9  ICD-9-CM: 530.81  8/21/2013 - 8/3/2021        RESOLVED: Adiposity ICD-10-CM: E66.9  ICD-9-CM: 278.00  2/1/2010 - 8/3/2021            Discharge Condition: Good    Hospital Course:     Lory Francisco is a 74 y.o. female with a past medical history for Hypertension, ESRD (HD on Mon/Fri) with a kidney transplant in 2002, Hypothyroidism, atrial fibrillation, and Hyperlipidemia, patient presents to the ED with a chief complaint of abdominal pain. Patient reports that the abdominal pain started on Friday evening during dialysis, she reports pain is continuous, to her lower left and right quadrants, non radiating, vomiting relieves the pain, and she rated the 9/10. Other accompanying symptoms includes, nausea, vomiting, diarrhea, and generalized weakness, patient denies shortness of breath, chest pain, palpitations, and chills. In the ED no noted leukocytosis, UA showed small leukocyte esterase plus bacteria, BUN 28 creatinine 7.37, lipase 605, CT of the abdomen showed mild sigmoid diverticulitis without evidence for abscess or perforation, also showed mild stranding along the pancreatic tail which may reflect pancreatitis and chest x-ray showed probable 6 right posterior rib fracture. In the ED patient received 4 mg of IV morphine and 4 mg of IV Zofran. Patient assessed in the ED, at the bedside, patient is alert and oriented, there is no acute distress noted. Patient agrees to admission for a diagnosis of pancreatitis and mild diverticulitis, treatment to include IV fluids gentle, n.p.o., pain management, and IV antibiotics. Patient eventually was able to tolerate PO intake. Will discharge on cipro, flagyl for diverticulitis.  Follow up with GI as outpatient    Physical exam:    Visit Vitals  BP (!) 103/52   Pulse (!) 57   Temp 97.5 °F (36.4 °C) (Oral)   Resp 18   Ht 5' 1\" (1.549 m)   Wt 70.9 kg (156 lb 6.4 oz)   SpO2 98%   BMI 29.55 kg/m²     Gen: NAD  Neuro: AAOx3  Chest: RRR, no murmurs  Lungs: CTAB, no wheezing or rhonchi  Abdomen: S/NT/ND/normoactive bowel sounds  Extremities: No pitting edema  Skin: No rashes      Consults: General Surgery    Significant Diagnostic Studies:     Disposition: home    Discharge Medications:   Current Discharge Medication List        START taking these medications    Details   metroNIDAZOLE (FLAGYL) 500 mg tablet Take 1 Tablet by mouth every twelve (12) hours for 7 doses. Qty: 7 Tablet, Refills: 0      ciprofloxacin HCl (CIPRO) 500 mg tablet Take 1 Tablet by mouth daily for 7 days. Qty: 7 Tablet, Refills: 0           CONTINUE these medications which have NOT CHANGED    Details   cinacalcet (SENSIPAR) 30 mg tablet Take 30 mg by mouth daily. clopidogreL (PLAVIX) 75 mg tab Take 75 mg by mouth daily. losartan (COZAAR) 50 mg tablet Take 1 Tablet by mouth daily. Qty: 90 Tablet, Refills: 1    Associated Diagnoses: Hypertension, unspecified type      amiodarone (CORDARONE) 200 mg tablet TAKE 1 TABLET BY MOUTH EVERY DAY  Qty: 90 Tablet, Refills: 1      amLODIPine (NORVASC) 10 mg tablet Take 1 tablet by mouth once daily  Qty: 90 Tablet, Refills: 1    Associated Diagnoses: Hypertension, unspecified type      carvediloL (COREG) 25 mg tablet Take 1 Tablet by mouth two (2) times daily (with meals). Qty: 180 Tablet, Refills: 1      meclizine (ANTIVERT) 25 mg tablet Take 1 Tablet by mouth three (3) times daily as needed for Dizziness. Qty: 90 Tablet, Refills: 1    Associated Diagnoses: Stenosis of left vertebral artery      dexlansoprazole (Dexilant) 60 mg CpDB capsule (delayed release) Take 1 Capsule by mouth in the morning. Qty: 90 Capsule, Refills: 0    Associated Diagnoses: Gastroesophageal reflux disease with esophagitis without hemorrhage      levothyroxine (SYNTHROID) 75 mcg tablet Take 1 Tablet by mouth Daily (before breakfast). Qty: 90 Tablet, Refills: 1      simvastatin (ZOCOR) 20 mg tablet TAKE 1 TABLET BY MOUTH DAILY AS DIRECTED. Qty: 90 Tablet, Refills: 1      fluticasone-umeclidinium-vilanterol (Trelegy Ellipta) 100-62.5-25 mcg inhaler Take 1 Puff by inhalation in the morning. Qty: 60 Each, Refills: 1      valGANciclovir (VALCYTE) 450 mg tablet Take 2 Tablets by mouth in the morning.   Qty: 180 Tablet, Refills: 1    Associated Diagnoses: ESRD (end stage renal disease) on dialysis (HCC)      dicyclomine (BENTYL) 10 mg capsule Take 1 Capsule by mouth in the morning. Qty: 90 Capsule, Refills: 1      montelukast (SINGULAIR) 10 mg tablet Take 1 Tablet by mouth in the morning. Qty: 90 Tablet, Refills: 1      hyoscyamine SL (LEVSIN/SL) 0.125 mg SL tablet 1 Tablet by SubLINGual route every four (4) hours as needed (irritable bowel). Qty: 30 Tablet, Refills: 2    Associated Diagnoses: Gastroesophageal reflux disease with esophagitis without hemorrhage      sucralfate (CARAFATE) 1 gram tablet TAKE 1 TABLET BY MOUTH FOUR TIMES DAILY  Qty: 360 Tablet, Refills: 1      apixaban (ELIQUIS) 2.5 mg tablet Take 1 Tablet by mouth two (2) times a day. Qty: 180 Tablet, Refills: 1      lidocaine-prilocaine (EMLA) topical cream APPLY SMALL AMOUNT TO ACCESS SITE (AVF) 1 TO 2 HOURS BEFORE DIALYSIS. COVER WITH OCCLUSIVE DRESSING (SARAN WRAP)      zolpidem (AMBIEN) 5 mg tablet Take 1 Tablet by mouth nightly as needed for Sleep. Max Daily Amount: 5 mg. Qty: 30 Tablet, Refills: 0    Associated Diagnoses: Primary insomnia      predniSONE (DELTASONE) 5 mg tablet Take 1 Tablet by mouth daily. calcitRIOL (ROCALTROL) 0.5 mcg capsule Take 0.5 mcg by mouth as directed. Take on non dialysis days      RenaPlex-D 800 mcg-12.5 mg -2,000 unit tab Take 1 Tablet by mouth daily. sevelamer carbonate (RENVELA) 800 mg tab tab Take 800 mg by mouth three (3) times daily. cranberry extract 425 mg cap Take 425 mg by mouth daily. cyclobenzaprine (FLEXERIL) 5 mg tablet Take 1 Tablet by mouth three (3) times daily as needed for Muscle Spasm(s). Qty: 180 Tablet, Refills: 0      fluticasone propionate (FLONASE) 50 mcg/actuation nasal spray Take 1 [place] by Both Nostrils route in the morning. Qty: 1 Each, Refills: 1      ondansetron (ZOFRAN ODT) 4 mg disintegrating tablet Take 1 Tablet by mouth every eight (8) hours as needed for Nausea or Nausea or Vomiting.   Qty: 90 Tablet, Refills: 1      albuterol (PROVENTIL VENTOLIN) 2.5 mg /3 mL (0.083 %) nebu USE 1 VIAL VIA NEBULIZER THREE TIMES DAILY  Qty: 90 mL, Refills: 3    Associated Diagnoses: Mild intermittent asthma without complication      aluminum & magnesium hydroxide-simethicone (Maalox Maximum Strength) 400-400-40 mg/5 mL suspension Take 10 mL by mouth every six (6) hours as needed for Indigestion. Qty: 250 mL, Refills: 0      ESTRACE 0.01 % (0.1 mg/gram) vaginal cream INSERT 2 GRAMS INTO VAGINA EVERY MONDAY AND THURSDAY  Qty: 42.5 g, Refills: 5             Activity: Activity as tolerated  Diet: Regular Diet  Wound Care: None needed    Follow-up Appointments   Procedures    FOLLOW UP VISIT Appointment in: One Week     Standing Status:   Standing     Number of Occurrences:   1     Order Specific Question:   Appointment in     Answer:    One Week     35 minutes spent on discharge summary    Signed By: Morena Mccoy MD     August 31, 2022

## 2022-08-31 NOTE — PROGRESS NOTES
1900 - Assumed care of pt, shift report given     1923 - VSS. Assessment completed. PRN Dilaudid and Zofran given for c/o generalized pain and nausea. No further needs voided at this time. CBWR     2111 - HS mediation given, pt tolerated well    0020 - VSS. PRN tylenol given for c/o headache. No further needs voiced at this time. 0200 - Rounded on pt, appears to be asleep. RR even and unlabored. CBWR     0450 - VSS. Phlebotomist unable to obtain labs, this nurse at bedside. Labs obtained x1 stick, pt tolerated well. 7595 - Morning medication given, pt tolerated well. No needs voiced at this time.  CBWR

## 2022-08-31 NOTE — PROGRESS NOTES
Problem: Risk for Spread of Infection  Goal: Prevent transmission of infectious organism to others  Description: Prevent the transmission of infectious organisms to other patients, staff members, and visitors. Outcome: Progressing Towards Goal     Problem: Falls - Risk of  Goal: *Absence of Falls  Description: Document Con Led Fall Risk and appropriate interventions in the flowsheet.   Outcome: Progressing Towards Goal  Note: Fall Risk Interventions:            Medication Interventions: Teach patient to arise slowly                   Problem: Nutrition Deficit  Goal: *Optimize nutritional status  Outcome: Progressing Towards Goal VSS. Pain 0/10. Resp even and unlabored. Pt verbalized understanding of discharge information and all questions were answered. Pt left ED in wheelchair.

## 2022-08-31 NOTE — PROCEDURES
Hemodialysis / [phone]    Vitals Pre Post Assessment Pre Post   BP BP: (!) 122/59 (08/31/22 1104)   133/61 LOC AxOx4 AxOx4   HR Pulse (Heart Rate): 61 (08/31/22 1104) 59 Lungs CTA Even and unlabored   Resp Resp Rate: 16 (08/31/22 1104) 16 Cardiac RRR RRR   Temp Temp: 97.5 °F (36.4 °C) (08/31/22 1104) 97.3 Skin Warm/dry Warm/dry   Weight    Edema generalized generalized   Tele status monitored monitored Pain Pain Intensity 1: 0 (08/31/22 0447) 0     Orders   Duration: Start: 7515 End: 1336 Total: 2.5 hrs   Dialyzer: Dialyzer/Set Up Inspection: Sudarshan Disla (08/31/22 1104)   K Bath: Dialysate K (mEq/L): 4 (08/31/22 1104)   Ca Bath: Dialysate CA (mEq/L): 2.5 (08/31/22 1104)   Na: Dialysate NA (mEq/L): 138 (08/31/22 1104)   Bicarb: Dialysate HCO3 (mEq/L): 35 (08/31/22 1104)   Target Fluid Removal: Goal/Amount of Fluid to Remove (mL): 2000 mL (08/31/22 1104)     Access   Type & Location: L AVF   Comments:                                     Left upper arm AV Fistula without evidence of warmth, redness, or drainage. +thrill/+bruit. Each access site disinfected for 60 seconds per site with alcohol swabs per P&P. Cannulated with 15G needles x2 and secured with paper tape. +aspiration/+flushed.        Labs   HBsAg (Antigen) / date: 8/29/22 negative                                              HBsAb (Antibody) / date: 8/29/22 susceptible   Source: Epic   Obtained/Reviewed  Critical Results Called HGB   Date Value Ref Range Status   08/31/2022 9.3 (L) 12.0 - 16.0 g/dL Final     Potassium   Date Value Ref Range Status   08/31/2022 3.7 3.5 - 5.5 mmol/L Final     Calcium   Date Value Ref Range Status   08/31/2022 7.7 (L) 8.5 - 10.1 mg/dL Final     BUN   Date Value Ref Range Status   08/31/2022 33 (H) 7 - 18 mg/dL Final     Creatinine   Date Value Ref Range Status   08/31/2022 6.93 (H) 0.60 - 1.30 mg/dL Final        Meds Given   Name Dose Route   N/a                 Adequacy / Fluid    Total Liters Process: 50.3 L   Net Fluid Removed: 1000 mL      Comments   Time Out Done:   (Time) 1100   Admitting Diagnosis: Pancreatitis / Diverticulitis   Consent obtained/signed: Informed Consent Verified: Yes (08/31/22 1104)   Machine / Cara Whitney # Machine Number: [id number] (08/31/22 1104)   Primary Nurse Rpt Pre: Baldev Brannon RN   Primary Nurse Rpt Post: Baldev Brannon RN   Pt Education: Procedural / infection control   Care Plan: Home after HD today   Pts outpatient clinic:      Tx Summary   Comments:                    1345 HD treatment initiated per physicians order. 1136 BP 98/58. UF goal reduced to 1.5 kg.  1300 BP 92/51. Patient states feels alright. Will monitor. 1305 BP recheck 97/53. Will monitor. 1310 Patient states feels dizzy and asks about BP. Recheck 74/44. UF off,  mL bolus given. 1315 BP 94/51. Patient states still doesn't feel right. UF remains off.  mL bolus given. 1318 /54. Little to no improvement in symptoms. Encouraged patient to give the bolus a few minutes to help. Will recheck in 5 minutes. 1323 /50. Patient states she isnt feeling any better. NS 50 mL more given. 1326 /53. States feeling a little better. Agrees to continue last 12 minutes of treatment. Will keep UF off for remainder of treatment. 1336 HD treatment completed per physician order. All possible blood returned to patient. Hemostasis achieved in <10 minutes. Access site dressings clean, dry, and intact. +thrill.

## 2022-09-01 ENCOUNTER — PATIENT OUTREACH (OUTPATIENT)
Dept: CASE MANAGEMENT | Age: 67
End: 2022-09-01

## 2022-09-01 NOTE — PROGRESS NOTES
Care Transitions Initial Call    Call within 2 business days of discharge: Yes     Patient: Romie Carrera Patient : 1955 MRN: [id number]    Last Discharge  Street       Date Complaint Diagnosis Description Type Department Provider    22 Abdominal Pain; Vomiting; Urinary Frequency Acute pancreatitis without infection or necrosis, unspecified pancreatitis type . .. ED to Hosp-Admission (Discharged) (ADMIT) Jessica Junior MD; Yarelis Price... Was this an external facility discharge? No Discharge Facility: [facility] 2022 to 2022. Challenges to be reviewed by the provider   Additional needs identified to be addressed with provider: no  none         Method of communication with provider : face to face, chart routing, staff message, phone, none    Discussed COVID-19 related testing which was not done at this time. Test results were not done. Patient informed of results, if available? N/A     Advance Care Planning:   Does patient have an Advance Directive: yes, reviewed and current, yes, reviewed and needs to be updated, decision makers updated, not on file; education provided, not on file, patient declined education, referral to internal ACP facilitator. Advance Care Planning   Healthcare Decision Maker:       Primary Decision Maker: James Trujillo - [phone]    Click here to complete [address] including selection of the Healthcare Decision Maker Relationship (ie \"Primary\")            Inpatient Readmission Risk score: Unplanned Readmit Risk Score: 19.5    Was this a readmission? no   Patient stated reason for the admission: abdominal pain    Patients top risk factors for readmission: medical condition-diverticulitis and support system   Interventions to address risk factors: Scheduled appointment with PCP-Alexis    Care Transition Nurse (CTN) contacted the patient by telephone to perform post hospital discharge assessment.  Verified name and  with patient as identifiers. Provided introduction to self, and explanation of the CTN role. CTN reviewed discharge instructions, medical action plan and red flags with patient who verbalized understanding. Were discharge instructions available to patient? yes. Reviewed appropriate site of care based on symptoms and resources available to patient including: PCP and MyChart Messaging. Patient given an opportunity to ask questions and does not have any further questions or concerns at this time. The patient agrees to contact the PCP office for questions related to their healthcare. Medication reconciliation was performed with patient, who verbalizes understanding of administration of home medications. Advised obtaining a 90-day supply of all daily and as-needed medications. Referral to Pharm D needed: no     Home Health/Outpatient orders at discharge: [address]: n/a  Date of initial visit: [address] [facility] ordered at discharge: None  [address] received: n/a    Was patient discharged with a pulse oximeter? no    Discussed follow-up appointments. If no appointment was previously scheduled, appointment scheduling offered: yes. Is follow up appointment scheduled within 7 days of discharge? no.   [facility] follow up appointment(s):   Future Appointments   Date Time Provider Elean Coello   2022  2:00 PM Eva Garcia NP [id number]   2022 10:00 AM Sabiha Crespo MD [facility]     Non-[facility] follow up appointment(s): n/a    Plan for follow-up call in 5-7 days based on severity of symptoms and risk factors. Plan for next call: symptom management-ensure patient symptoms have improved. CTN provided contact information for future needs. Goals Addressed                   This Visit's Progress     Prevent complications post hospitalization.         CTN will monitor X 4 weeks    Ensure provider appt is scheduled within 7 days post-discharge 9/1/2022 Patient has appt with PCP 11/8/2022. Will see if it can be moved up. Confirm patient attended post-discharge provider apt    Complete post-visit call to confirm attendance and update care needs 9/1/2022 Patient is doing a little better. No care needs noted. Review/educate common or potential \"red flags\" of condition worsening 9/1/2022 Reviewed signs/symptoms of diverticulitis such as pain in abdomen, bloating, blood in stool, constipation, diarrhea, N & V, flatulence, chills, fever, loss of appetite and cramping to name a few. Evaluate adherence to medications and priority barriers to resolve 9/1/2022 Patient stated that she has most all meds and is taking as directed. Daughter will go  antibiotic this am that patient d/c with. Instruct on adherence to medications as ordered and assess for therapeutic response and side-effects  9/1/2022 Patient and daughter know why patient is taking meds and knows when to call physician for any issues. Discuss and evaluate ADL performance. Provide recommendations on energy conservation, particularly related to transition home from an inpatient admission. 9/1/2022 Patient is moving about and she rests as needed.

## 2022-09-06 ENCOUNTER — HOSPITAL ENCOUNTER (EMERGENCY)
Age: 67
Discharge: HOME | End: 2022-09-06
Attending: EMERGENCY MEDICINE | Admitting: EMERGENCY MEDICINE
Payer: MEDICARE

## 2022-09-06 ENCOUNTER — APPOINTMENT (OUTPATIENT)
Dept: CT IMAGING | Age: 67
End: 2022-09-06
Attending: EMERGENCY MEDICINE
Payer: MEDICARE

## 2022-09-06 DIAGNOSIS — K57.92 DIVERTICULITIS: Primary | ICD-10-CM

## 2022-09-06 LAB
ALBUMIN SERPL-MCNC: 3 G/DL (ref 3.4–5)
ALBUMIN/GLOB SERPL: 1 {RATIO} (ref 0.8–1.7)
ALP SERPL-CCNC: 85 U/L (ref 45–117)
ALT SERPL-CCNC: 27 U/L (ref 13–56)
ANION GAP SERPL CALC-SCNC: 8 MMOL/L (ref 3–18)
AST SERPL W P-5'-P-CCNC: 31 U/L (ref 10–38)
BASOPHILS # BLD: 0.1 K/UL (ref 0–0.1)
BASOPHILS NFR BLD: 1 % (ref 0–2)
BILIRUB SERPL-MCNC: 0.5 MG/DL (ref 0.2–1)
BUN SERPL-MCNC: 28 MG/DL (ref 7–18)
BUN/CREAT SERPL: 5 (ref 12–20)
CA-I BLD-MCNC: 8.2 MG/DL (ref 8.5–10.1)
CHLORIDE SERPL-SCNC: 100 MMOL/L (ref 100–111)
CO2 SERPL-SCNC: 29 MMOL/L (ref 21–32)
CREAT SERPL-MCNC: 6.11 MG/DL (ref 0.6–1.3)
DIFFERENTIAL METHOD BLD: ABNORMAL
EOSINOPHIL # BLD: 0.1 K/UL (ref 0–0.4)
EOSINOPHIL NFR BLD: 1 % (ref 0–5)
ERYTHROCYTE [DISTWIDTH] IN BLOOD BY AUTOMATED COUNT: 19.3 % (ref 11.6–14.5)
GLOBULIN SER CALC-MCNC: 3.1 G/DL (ref 2–4)
GLUCOSE SERPL-MCNC: 83 MG/DL (ref 74–99)
HCT VFR BLD AUTO: 37.5 % (ref 35–45)
HGB BLD-MCNC: 11.8 G/DL (ref 12–16)
IMM GRANULOCYTES # BLD AUTO: 0 K/UL (ref 0–0.04)
IMM GRANULOCYTES NFR BLD AUTO: 0 % (ref 0–0.5)
LIPASE SERPL-CCNC: 572 U/L (ref 73–393)
LYMPHOCYTES # BLD: 2.1 K/UL (ref 0.9–3.6)
LYMPHOCYTES NFR BLD: 33 % (ref 21–52)
MCH RBC QN AUTO: 32.6 PG (ref 24–34)
MCHC RBC AUTO-ENTMCNC: 31.5 G/DL (ref 31–37)
MCV RBC AUTO: 103.6 FL (ref 78–100)
MONOCYTES # BLD: 1.1 K/UL (ref 0.05–1.2)
MONOCYTES NFR BLD: 17 % (ref 3–10)
NEUTS SEG # BLD: 2.9 K/UL (ref 1.8–8)
NEUTS SEG NFR BLD: 48 % (ref 40–73)
NRBC # BLD: 0 K/UL (ref 0–0.01)
NRBC BLD-RTO: 0 PER 100 WBC
PLATELET # BLD AUTO: 123 K/UL (ref 135–420)
PLATELET COMMENTS,PCOM: ADEQUATE
PMV BLD AUTO: 9.4 FL (ref 9.2–11.8)
POTASSIUM SERPL-SCNC: 4 MMOL/L (ref 3.5–5.5)
PROT SERPL-MCNC: 6.1 G/DL (ref 6.4–8.2)
RBC # BLD AUTO: 3.62 M/UL (ref 4.2–5.3)
RBC MORPH BLD: ABNORMAL
SODIUM SERPL-SCNC: 137 MMOL/L (ref 136–145)
WBC # BLD AUTO: 6.3 K/UL (ref 4.6–13.2)

## 2022-09-06 PROCEDURE — 99284 EMERGENCY DEPT VISIT MOD MDM: CPT | Performed by: EMERGENCY MEDICINE

## 2022-09-06 PROCEDURE — 74011250637 HC RX REV CODE- 250/637: Performed by: EMERGENCY MEDICINE

## 2022-09-06 PROCEDURE — 96375 TX/PRO/DX INJ NEW DRUG ADDON: CPT | Performed by: EMERGENCY MEDICINE

## 2022-09-06 PROCEDURE — 83690 ASSAY OF LIPASE: CPT

## 2022-09-06 PROCEDURE — 80053 COMPREHEN METABOLIC PANEL: CPT

## 2022-09-06 PROCEDURE — 74176 CT ABD & PELVIS W/O CONTRAST: CPT

## 2022-09-06 PROCEDURE — 96374 THER/PROPH/DIAG INJ IV PUSH: CPT | Performed by: EMERGENCY MEDICINE

## 2022-09-06 PROCEDURE — 85025 COMPLETE CBC W/AUTO DIFF WBC: CPT

## 2022-09-06 PROCEDURE — 74011250636 HC RX REV CODE- 250/636: Performed by: EMERGENCY MEDICINE

## 2022-09-06 RX ORDER — FENTANYL CITRATE 50 UG/ML
50 INJECTION, SOLUTION INTRAMUSCULAR; INTRAVENOUS
Status: COMPLETED | OUTPATIENT
Start: 2022-09-06 | End: 2022-09-06

## 2022-09-06 RX ORDER — ONDANSETRON 2 MG/ML
4 INJECTION INTRAMUSCULAR; INTRAVENOUS
Status: COMPLETED | OUTPATIENT
Start: 2022-09-06 | End: 2022-09-06

## 2022-09-06 RX ORDER — AMOXICILLIN AND CLAVULANATE POTASSIUM 875; 125 MG/1; MG/1
1 TABLET, FILM COATED ORAL 2 TIMES DAILY
Qty: 20 TABLET | Refills: 0 | Status: SHIPPED | OUTPATIENT
Start: 2022-09-06 | End: 2022-09-14

## 2022-09-06 RX ORDER — METRONIDAZOLE 500 MG/1
500 TABLET ORAL 3 TIMES DAILY
Qty: 30 TABLET | Refills: 0 | Status: SHIPPED | OUTPATIENT
Start: 2022-09-06 | End: 2022-09-14

## 2022-09-06 RX ORDER — AMOXICILLIN AND CLAVULANATE POTASSIUM 875; 125 MG/1; MG/1
1 TABLET, FILM COATED ORAL
Status: COMPLETED | OUTPATIENT
Start: 2022-09-06 | End: 2022-09-06

## 2022-09-06 RX ORDER — METRONIDAZOLE 250 MG/1
500 TABLET ORAL
Status: COMPLETED | OUTPATIENT
Start: 2022-09-06 | End: 2022-09-06

## 2022-09-06 RX ORDER — HYDROCODONE BITARTRATE AND ACETAMINOPHEN 5; 325 MG/1; MG/1
1 TABLET ORAL
Qty: 10 TABLET | Refills: 0 | Status: SHIPPED | OUTPATIENT
Start: 2022-09-06 | End: 2022-09-14

## 2022-09-06 RX ORDER — POLYETHYLENE GLYCOL 3350 17 G/17G
17 POWDER, FOR SOLUTION ORAL DAILY
Qty: 10 EACH | Refills: 0 | Status: ON HOLD | OUTPATIENT
Start: 2022-09-06 | End: 2022-09-14 | Stop reason: SDUPTHER

## 2022-09-06 RX ADMIN — FENTANYL CITRATE 50 MCG: 50 INJECTION, SOLUTION INTRAMUSCULAR; INTRAVENOUS at 19:15

## 2022-09-06 RX ADMIN — ONDANSETRON 4 MG: 2 INJECTION INTRAMUSCULAR; INTRAVENOUS at 19:15

## 2022-09-06 RX ADMIN — AMOXICILLIN AND CLAVULANATE POTASSIUM 1 TABLET: 875; 125 TABLET, FILM COATED ORAL at 19:15

## 2022-09-06 RX ADMIN — METRONIDAZOLE 500 MG: 250 TABLET ORAL at 19:15

## 2022-09-06 NOTE — ED TRIAGE NOTES
Nausea, vomiting (x3 this morning), and abdominal pain x2 days. Was admitted for aforementioned symptoms last week (was released Wednesday last week).

## 2022-09-07 VITALS
WEIGHT: 140 LBS | DIASTOLIC BLOOD PRESSURE: 75 MMHG | BODY MASS INDEX: 26.43 KG/M2 | HEIGHT: 61 IN | SYSTOLIC BLOOD PRESSURE: 138 MMHG | HEART RATE: 64 BPM | OXYGEN SATURATION: 98 % | TEMPERATURE: 98.1 F | RESPIRATION RATE: 20 BRPM

## 2022-09-07 NOTE — ED NOTES
I have reviewed discharge instructions with the patient. The patient verbalized understanding.        Reviewed medication compliance, follow up with PCP, return to ER for any new or worrisome concerns

## 2022-09-10 ENCOUNTER — APPOINTMENT (OUTPATIENT)
Dept: CT IMAGING | Age: 67
DRG: 391 | End: 2022-09-10
Attending: EMERGENCY MEDICINE
Payer: MEDICARE

## 2022-09-10 ENCOUNTER — HOSPITAL ENCOUNTER (INPATIENT)
Age: 67
LOS: 2 days | Discharge: HOME | DRG: 391 | End: 2022-09-14
Attending: EMERGENCY MEDICINE | Admitting: INTERNAL MEDICINE
Payer: MEDICARE

## 2022-09-10 ENCOUNTER — APPOINTMENT (OUTPATIENT)
Dept: GENERAL RADIOLOGY | Age: 67
DRG: 391 | End: 2022-09-10
Attending: FAMILY MEDICINE
Payer: MEDICARE

## 2022-09-10 DIAGNOSIS — R11.2 NAUSEA AND VOMITING, UNSPECIFIED VOMITING TYPE: ICD-10-CM

## 2022-09-10 DIAGNOSIS — K52.9 NONINFECTIOUS GASTROENTERITIS, UNSPECIFIED TYPE: Primary | ICD-10-CM

## 2022-09-10 DIAGNOSIS — E87.6 HYPOKALEMIA: ICD-10-CM

## 2022-09-10 DIAGNOSIS — N18.6 ESRD (END STAGE RENAL DISEASE) (HCC): ICD-10-CM

## 2022-09-10 LAB
ALBUMIN SERPL-MCNC: 2.3 G/DL (ref 3.4–5)
ALBUMIN/GLOB SERPL: 0.9 {RATIO} (ref 0.8–1.7)
ALP SERPL-CCNC: 62 U/L (ref 45–117)
ALT SERPL-CCNC: 13 U/L (ref 13–56)
ANION GAP SERPL CALC-SCNC: 17 MMOL/L (ref 3–18)
AST SERPL W P-5'-P-CCNC: 11 U/L (ref 10–38)
BASOPHILS # BLD: 0 K/UL (ref 0–0.1)
BASOPHILS NFR BLD: 1 % (ref 0–2)
BILIRUB SERPL-MCNC: 0.4 MG/DL (ref 0.2–1)
BNP SERPL-MCNC: 8118 PG/ML (ref 0–900)
BUN SERPL-MCNC: 37 MG/DL (ref 7–18)
BUN/CREAT SERPL: 5 (ref 12–20)
CA-I BLD-MCNC: 6.1 MG/DL (ref 8.5–10.1)
CHLORIDE SERPL-SCNC: 111 MMOL/L (ref 100–111)
CO2 SERPL-SCNC: 14 MMOL/L (ref 21–32)
CREAT SERPL-MCNC: 7.8 MG/DL (ref 0.6–1.3)
DIFFERENTIAL METHOD BLD: ABNORMAL
EOSINOPHIL # BLD: 0.1 K/UL (ref 0–0.4)
EOSINOPHIL NFR BLD: 2 % (ref 0–5)
ERYTHROCYTE [DISTWIDTH] IN BLOOD BY AUTOMATED COUNT: 18.1 % (ref 11.6–14.5)
GLOBULIN SER CALC-MCNC: 2.7 G/DL (ref 2–4)
GLUCOSE SERPL-MCNC: 91 MG/DL (ref 74–99)
HCT VFR BLD AUTO: 30.3 % (ref 35–45)
HGB BLD-MCNC: 9.6 G/DL (ref 12–16)
IMM GRANULOCYTES # BLD AUTO: 0 K/UL (ref 0–0.04)
IMM GRANULOCYTES NFR BLD AUTO: 1 % (ref 0–0.5)
LACTATE SERPL-SCNC: 0.4 MMOL/L (ref 0.4–2)
LIPASE SERPL-CCNC: 768 U/L (ref 73–393)
LYMPHOCYTES # BLD: 1.6 K/UL (ref 0.9–3.6)
LYMPHOCYTES NFR BLD: 25 % (ref 21–52)
MCH RBC QN AUTO: 32.5 PG (ref 24–34)
MCHC RBC AUTO-ENTMCNC: 31.7 G/DL (ref 31–37)
MCV RBC AUTO: 102.7 FL (ref 78–100)
MONOCYTES # BLD: 1.1 K/UL (ref 0.05–1.2)
MONOCYTES NFR BLD: 16 % (ref 3–10)
NEUTS SEG # BLD: 3.7 K/UL (ref 1.8–8)
NEUTS SEG NFR BLD: 55 % (ref 40–73)
NRBC # BLD: 0 K/UL (ref 0–0.01)
NRBC BLD-RTO: 0 PER 100 WBC
PLATELET # BLD AUTO: 167 K/UL (ref 135–420)
PMV BLD AUTO: 8.8 FL (ref 9.2–11.8)
POTASSIUM SERPL-SCNC: 3.2 MMOL/L (ref 3.5–5.5)
PROT SERPL-MCNC: 5 G/DL (ref 6.4–8.2)
RBC # BLD AUTO: 2.95 M/UL (ref 4.2–5.3)
SODIUM SERPL-SCNC: 142 MMOL/L (ref 136–145)
WBC # BLD AUTO: 6.6 K/UL (ref 4.6–13.2)

## 2022-09-10 PROCEDURE — 74011250637 HC RX REV CODE- 250/637: Performed by: NURSE PRACTITIONER

## 2022-09-10 PROCEDURE — 83690 ASSAY OF LIPASE: CPT

## 2022-09-10 PROCEDURE — 99285 EMERGENCY DEPT VISIT HI MDM: CPT

## 2022-09-10 PROCEDURE — 74011000258 HC RX REV CODE- 258: Performed by: FAMILY MEDICINE

## 2022-09-10 PROCEDURE — 83605 ASSAY OF LACTIC ACID: CPT

## 2022-09-10 PROCEDURE — 96375 TX/PRO/DX INJ NEW DRUG ADDON: CPT

## 2022-09-10 PROCEDURE — G0378 HOSPITAL OBSERVATION PER HR: HCPCS

## 2022-09-10 PROCEDURE — 85025 COMPLETE CBC W/AUTO DIFF WBC: CPT

## 2022-09-10 PROCEDURE — 83880 ASSAY OF NATRIURETIC PEPTIDE: CPT

## 2022-09-10 PROCEDURE — 94664 DEMO&/EVAL PT USE INHALER: CPT

## 2022-09-10 PROCEDURE — 74011250636 HC RX REV CODE- 250/636: Performed by: FAMILY MEDICINE

## 2022-09-10 PROCEDURE — 36415 COLL VENOUS BLD VENIPUNCTURE: CPT

## 2022-09-10 PROCEDURE — 74011250637 HC RX REV CODE- 250/637: Performed by: FAMILY MEDICINE

## 2022-09-10 PROCEDURE — 71045 X-RAY EXAM CHEST 1 VIEW: CPT

## 2022-09-10 PROCEDURE — 96365 THER/PROPH/DIAG IV INF INIT: CPT

## 2022-09-10 PROCEDURE — 74011000250 HC RX REV CODE- 250: Performed by: NURSE PRACTITIONER

## 2022-09-10 PROCEDURE — 74176 CT ABD & PELVIS W/O CONTRAST: CPT

## 2022-09-10 PROCEDURE — 96374 THER/PROPH/DIAG INJ IV PUSH: CPT

## 2022-09-10 PROCEDURE — 74011250636 HC RX REV CODE- 250/636: Performed by: EMERGENCY MEDICINE

## 2022-09-10 PROCEDURE — 96366 THER/PROPH/DIAG IV INF ADDON: CPT

## 2022-09-10 PROCEDURE — 80053 COMPREHEN METABOLIC PANEL: CPT

## 2022-09-10 RX ORDER — AMIODARONE HYDROCHLORIDE 200 MG/1
200 TABLET ORAL DAILY
Status: DISCONTINUED | OUTPATIENT
Start: 2022-09-11 | End: 2022-09-14 | Stop reason: HOSPADM

## 2022-09-10 RX ORDER — PROCHLORPERAZINE EDISYLATE 5 MG/ML
10 INJECTION INTRAMUSCULAR; INTRAVENOUS ONCE
Status: COMPLETED | OUTPATIENT
Start: 2022-09-10 | End: 2022-09-10

## 2022-09-10 RX ORDER — CINACALCET 30 MG/1
30 TABLET, FILM COATED ORAL DAILY
Status: DISCONTINUED | OUTPATIENT
Start: 2022-09-11 | End: 2022-09-14 | Stop reason: HOSPADM

## 2022-09-10 RX ORDER — FLUTICASONE PROPIONATE 50 MCG
1 SPRAY, SUSPENSION (ML) NASAL DAILY
Status: DISCONTINUED | OUTPATIENT
Start: 2022-09-11 | End: 2022-09-14 | Stop reason: HOSPADM

## 2022-09-10 RX ORDER — ONDANSETRON 4 MG/1
8 TABLET, ORALLY DISINTEGRATING ORAL
Qty: 15 TABLET | Refills: 0 | Status: SHIPPED | OUTPATIENT
Start: 2022-09-10 | End: 2022-09-14 | Stop reason: SDUPTHER

## 2022-09-10 RX ORDER — VALGANCICLOVIR 450 MG/1
900 TABLET, FILM COATED ORAL DAILY
Refills: 1 | Status: DISCONTINUED | OUTPATIENT
Start: 2022-09-12 | End: 2022-09-14 | Stop reason: HOSPADM

## 2022-09-10 RX ORDER — DICYCLOMINE HYDROCHLORIDE 10 MG/1
10 CAPSULE ORAL DAILY
Status: DISCONTINUED | OUTPATIENT
Start: 2022-09-11 | End: 2022-09-14 | Stop reason: HOSPADM

## 2022-09-10 RX ORDER — ACETAMINOPHEN 650 MG/1
650 SUPPOSITORY RECTAL
Status: DISCONTINUED | OUTPATIENT
Start: 2022-09-10 | End: 2022-09-14 | Stop reason: HOSPADM

## 2022-09-10 RX ORDER — ALBUTEROL SULFATE 0.83 MG/ML
2.5 SOLUTION RESPIRATORY (INHALATION)
Status: DISCONTINUED | OUTPATIENT
Start: 2022-09-10 | End: 2022-09-14 | Stop reason: HOSPADM

## 2022-09-10 RX ORDER — LEVOTHYROXINE SODIUM 75 UG/1
75 TABLET ORAL
Status: DISCONTINUED | OUTPATIENT
Start: 2022-09-11 | End: 2022-09-14 | Stop reason: HOSPADM

## 2022-09-10 RX ORDER — SODIUM CHLORIDE 0.9 % (FLUSH) 0.9 %
5-40 SYRINGE (ML) INJECTION AS NEEDED
Status: DISCONTINUED | OUTPATIENT
Start: 2022-09-10 | End: 2022-09-14 | Stop reason: HOSPADM

## 2022-09-10 RX ORDER — PANTOPRAZOLE SODIUM 40 MG/1
40 TABLET, DELAYED RELEASE ORAL
Status: DISCONTINUED | OUTPATIENT
Start: 2022-09-11 | End: 2022-09-14 | Stop reason: HOSPADM

## 2022-09-10 RX ORDER — ACETAMINOPHEN 325 MG/1
650 TABLET ORAL
Status: DISCONTINUED | OUTPATIENT
Start: 2022-09-10 | End: 2022-09-14 | Stop reason: HOSPADM

## 2022-09-10 RX ORDER — LOSARTAN POTASSIUM 25 MG/1
50 TABLET ORAL DAILY
Status: DISCONTINUED | OUTPATIENT
Start: 2022-09-11 | End: 2022-09-14 | Stop reason: HOSPADM

## 2022-09-10 RX ORDER — POTASSIUM CHLORIDE 750 MG/1
40 TABLET, EXTENDED RELEASE ORAL
Status: COMPLETED | OUTPATIENT
Start: 2022-09-10 | End: 2022-09-10

## 2022-09-10 RX ORDER — SEVELAMER CARBONATE 800 MG/1
800 TABLET, FILM COATED ORAL 3 TIMES DAILY
Status: DISCONTINUED | OUTPATIENT
Start: 2022-09-10 | End: 2022-09-14 | Stop reason: HOSPADM

## 2022-09-10 RX ORDER — BUDESONIDE 0.25 MG/2ML
250 INHALANT ORAL
Status: DISCONTINUED | OUTPATIENT
Start: 2022-09-11 | End: 2022-09-14 | Stop reason: HOSPADM

## 2022-09-10 RX ORDER — METRONIDAZOLE 500 MG/100ML
500 INJECTION, SOLUTION INTRAVENOUS
Status: COMPLETED | OUTPATIENT
Start: 2022-09-10 | End: 2022-09-11

## 2022-09-10 RX ORDER — CYCLOBENZAPRINE HCL 10 MG
5 TABLET ORAL
Status: DISCONTINUED | OUTPATIENT
Start: 2022-09-10 | End: 2022-09-14 | Stop reason: HOSPADM

## 2022-09-10 RX ORDER — CARVEDILOL 12.5 MG/1
25 TABLET ORAL 2 TIMES DAILY WITH MEALS
Status: DISCONTINUED | OUTPATIENT
Start: 2022-09-11 | End: 2022-09-14 | Stop reason: HOSPADM

## 2022-09-10 RX ORDER — POLYETHYLENE GLYCOL 3350 17 G/17G
17 POWDER, FOR SOLUTION ORAL DAILY PRN
Status: DISCONTINUED | OUTPATIENT
Start: 2022-09-10 | End: 2022-09-14 | Stop reason: HOSPADM

## 2022-09-10 RX ORDER — CLOPIDOGREL BISULFATE 75 MG/1
75 TABLET ORAL DAILY
Status: DISCONTINUED | OUTPATIENT
Start: 2022-09-11 | End: 2022-09-14 | Stop reason: HOSPADM

## 2022-09-10 RX ORDER — SODIUM CHLORIDE 0.9 % (FLUSH) 0.9 %
5-40 SYRINGE (ML) INJECTION EVERY 8 HOURS
Status: DISCONTINUED | OUTPATIENT
Start: 2022-09-10 | End: 2022-09-14 | Stop reason: HOSPADM

## 2022-09-10 RX ORDER — ATORVASTATIN CALCIUM 10 MG/1
10 TABLET, FILM COATED ORAL
Status: DISCONTINUED | OUTPATIENT
Start: 2022-09-10 | End: 2022-09-14 | Stop reason: HOSPADM

## 2022-09-10 RX ORDER — MORPHINE SULFATE 4 MG/ML
4 INJECTION, SOLUTION INTRAMUSCULAR; INTRAVENOUS ONCE
Status: COMPLETED | OUTPATIENT
Start: 2022-09-10 | End: 2022-09-10

## 2022-09-10 RX ORDER — AMLODIPINE BESYLATE 5 MG/1
10 TABLET ORAL DAILY
Status: DISCONTINUED | OUTPATIENT
Start: 2022-09-11 | End: 2022-09-14 | Stop reason: HOSPADM

## 2022-09-10 RX ORDER — ZOLPIDEM TARTRATE 5 MG/1
5 TABLET ORAL
Status: DISCONTINUED | OUTPATIENT
Start: 2022-09-10 | End: 2022-09-14 | Stop reason: HOSPADM

## 2022-09-10 RX ORDER — IPRATROPIUM BROMIDE AND ALBUTEROL SULFATE 2.5; .5 MG/3ML; MG/3ML
3 SOLUTION RESPIRATORY (INHALATION)
Status: DISCONTINUED | OUTPATIENT
Start: 2022-09-11 | End: 2022-09-12

## 2022-09-10 RX ORDER — MONTELUKAST SODIUM 10 MG/1
10 TABLET ORAL DAILY
Status: DISCONTINUED | OUTPATIENT
Start: 2022-09-11 | End: 2022-09-14 | Stop reason: HOSPADM

## 2022-09-10 RX ADMIN — MORPHINE SULFATE 4 MG: 4 INJECTION, SOLUTION INTRAMUSCULAR; INTRAVENOUS at 19:35

## 2022-09-10 RX ADMIN — SEVELAMER CARBONATE 800 MG: 800 TABLET, FILM COATED ORAL at 23:57

## 2022-09-10 RX ADMIN — SODIUM CHLORIDE 500 ML: 9 INJECTION, SOLUTION INTRAVENOUS at 18:47

## 2022-09-10 RX ADMIN — PROCHLORPERAZINE EDISYLATE 10 MG: 5 INJECTION INTRAMUSCULAR; INTRAVENOUS at 19:35

## 2022-09-10 RX ADMIN — ATORVASTATIN CALCIUM 10 MG: 10 TABLET, FILM COATED ORAL at 23:57

## 2022-09-10 RX ADMIN — POTASSIUM CHLORIDE 40 MEQ: 750 TABLET, EXTENDED RELEASE ORAL at 20:20

## 2022-09-10 RX ADMIN — METRONIDAZOLE 500 MG: 500 INJECTION, SOLUTION INTRAVENOUS at 18:50

## 2022-09-10 RX ADMIN — PIPERACILLIN AND TAZOBACTAM 3.38 G: 3; .375 INJECTION, POWDER, FOR SOLUTION INTRAVENOUS at 19:36

## 2022-09-10 RX ADMIN — SODIUM CHLORIDE, PRESERVATIVE FREE 10 ML: 5 INJECTION INTRAVENOUS at 23:57

## 2022-09-10 NOTE — ED TRIAGE NOTES
Patient recently seen in ED for diverticulitis, states she has been throwing up. States she has not had anything to eat since she was discharged from the ED    States she has been taking antibiotics as prescribed, but has not been taking her home medications due to nausea and vomiting. Vomited x 45 today. Dialysis on Monday and Fridays, only went 1 day this week on Monday. Did not go to Dialysis on Friday    Per EMS: home with sister, on bed, weakness, vital signs stable. Did not follow up with PCP as instructed.      States out of zofran    Patient is non compliant with care yes

## 2022-09-10 NOTE — ED PROVIDER NOTES
EMERGENCY DEPARTMENT HISTORY AND PHYSICAL EXAM      Date: 9/10/2022  Patient Name: Margarita Daniel    History of Presenting Illness     Chief Complaint   Patient presents with    Vomiting       History Provided By: Patient    HPI: Margarita Daniel, 74 y.o. female PMHx of diverticulitis, JEFF, renal transplant, CKD, presents with NVD and abdominal pain. Has been going on for about a month and was admitted and discharged for acute diverticulitis. She states she has been unable to keep any fluids, food or medication down since her last visit 4 days ago. She was supposed to be on Augmentin and Flagyl but they cause her to vomit as well. She states she feels miserable and is getting worse. Her stool is loose but not bloody. She denies fever, chills, hematochezia and hematemesis. Nothing makes her better. She gets dialyzed Monday and Friday. There are no other complaints, changes, or physical findings at this time.     PCP: Lukasz Casanova MD    Current Facility-Administered Medications   Medication Dose Route Frequency Provider Last Rate Last Admin    0.9% sodium chloride infusion  50 mL/hr IntraVENous CONTINUOUS Beata Martines NP 50 mL/hr at 09/11/22 0214 50 mL/hr at 09/11/22 0214    ondansetron (ZOFRAN) injection 4 mg  4 mg IntraVENous Q4H PRN Beata Martines NP   4 mg at 09/11/22 0242    morphine injection 2 mg  2 mg IntraVENous Q4H PRN Beata Martines NP   2 mg at 09/11/22 0242    piperacillin-tazobactam (ZOSYN) 3.375 g in 0.9% sodium chloride (MBP/ADV) 100 mL MBP  3.375 g IntraVENous Q12H Dana Patterson  mL/hr at 09/11/22 0243 3.375 g at 09/11/22 0243    sodium chloride (NS) flush 5-40 mL  5-40 mL IntraVENous Q8H Beata Martines NP   10 mL at 09/11/22 0530    sodium chloride (NS) flush 5-40 mL  5-40 mL IntraVENous PRN Beata Martines NP        acetaminophen (TYLENOL) tablet 650 mg  650 mg Oral Q6H PRN Beata Martines NP        Or    acetaminophen (TYLENOL) suppository 650 mg  650 mg Rectal Q6H PRN Beata Martines, NP        polyethylene glycol (MIRALAX) packet 17 g  17 g Oral DAILY PRN Martines, Whitney Moran, NP        amiodarone (CORDARONE) tablet 200 mg  200 mg Oral DAILY Martines, Whitney Moran, NP        amLODIPine (NORVASC) tablet 10 mg  10 mg Oral DAILY Martines, Whitney Moran, NP        apixaban (ELIQUIS) tablet 2.5 mg  2.5 mg Oral BID Martines, Whitney Moran, NP        carvediloL (COREG) tablet 25 mg  25 mg Oral BID WITH MEALS Martines, Whitney Moran, NP        cinacalcet (SENSIPAR) tablet 30 mg  30 mg Oral DAILY Martines, Whitney Moran, NP        clopidogreL (PLAVIX) tablet 75 mg  75 mg Oral DAILY Martines, Whitney Moran, NP        cyclobenzaprine (FLEXERIL) tablet 5 mg  5 mg Oral TID PRN Martines, Whitney Moran, NP        pantoprazole (PROTONIX) tablet 40 mg  40 mg Oral ACB Martines, Whitney Moran, NP        dicyclomine (BENTYL) capsule 10 mg  10 mg Oral DAILY Martines, Whitney Moran, NP        fluticasone propionate (FLONASE) 50 mcg/actuation nasal spray 1 Spray  1 Spray Both Nostrils DAILY [facility], NP        levothyroxine (SYNTHROID) tablet 75 mcg  75 mcg Oral ACB Martines, Whitney Moran, NP        losartan (COZAAR) tablet 50 mg  50 mg Oral DAILY Martines, Whitney Moran, NP        montelukast (SINGULAIR) tablet 10 mg  10 mg Oral DAILY Martines, Whitney Moran, NP        sevelamer carbonate (RENVELA) tab 800 mg  800 mg Oral TID Martines, Whitney Moran, NP   800 mg at 09/10/22 2357    atorvastatin (LIPITOR) tablet 10 mg  10 mg Oral QHS Martines, Whitney Moran, NP   10 mg at 09/10/22 2357    . PHARMACY TO SUBSTITUTE PER PROTOCOL (Reordered from: valGANciclovir (VALCYTE) 450 mg tablet)    [facility]        zolpidem (AMBIEN) tablet 5 mg  5 mg Oral QHS PRN [facility], NP        albuterol-ipratropium (DUO-NEB) 2.5 MG-0.5 MG/3 ML  3 mL Nebulization Q6H RT Brittany Retana MD        albuterol (PROVENTIL VENTOLIN) nebulizer solution 2.5 mg  2.5 mg Nebulization Q4H PRN Brittany Retana MD        budesonide (PULMICORT) 250 mcg/2ml nebulizer susp  250 mcg Nebulization BID RT Brittany Retana MD           Past History   Past Medical History:  Past Medical History:   Diagnosis Date    [facility] 05/09/2019    Anemia NEC     Anxiety     Arrhythmia     Asthma     Asthma     Burning with urination     frequent uti    Cholecystitis 01/12/2019    Chronic kidney disease     dialysis    CMV (cytomegalovirus) antibody positive     COPD (chronic obstructive pulmonary disease) ([facility]     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    Dialysis patient [facility])     M/W/F    Diverticular disease of colon 08/21/2013    Dyspepsia and other specified disorders of function of stomach     stomach ulcer    Elevated troponin 10/21/2019    Essential hypertension     GERD (gastroesophageal reflux disease)     History of chemotherapy     History of renal transplant 08/21/2013    (LD 2/12/2002)    HLD (hyperlipidemia)     Hypercholesteremia     Hypertension     Hypothyroidism 03/23/2022    Kidney transplant rejection     Menopause     Migraine     Obesity     Pyelonephritis 07/13/2020    Renal cyst 2002    kidney transplant     Spontaneous bacterial peritonitis ([facility] 01/16/2019    Ulcer        Past Surgical History:  Past Surgical History:   Procedure Laterality Date    COLONOSCOPY      Dr Althea Tuttle  5yrs ago    ENDOSCOPY VISIT-OUTPATIENT      Dr Althea Tuttle  5 yrs ago    ENDOSCOPY, COLON, DIAGNOSTIC      with Dr. Cohen Code CHOLECYSTECTOMY  02/2021    HX GI      ulcer    HX HEENT      sinus surg    HX RENAL TRANSPLANT      HX TRANSPLANT      kidney transplant 2002    VASCULAR SURGERY PROCEDURE UNLIST      shunt in prep for dialysis       Family History:  Family History   Problem Relation Age of Onset    Cervical Cancer Mother     Arthritis-rheumatoid Mother     Hypertension Father     Diabetes Father     Thyroid Disease Sister     Colon Polyps Brother        Social History:  Social History     Tobacco Use    Smoking status: Never    Smokeless tobacco: Never   Vaping Use    Vaping Use: Never used   Substance Use Topics    Alcohol use: No    Drug use:  No Allergies: Allergies   Allergen Reactions    Aspirin Rash and Unknown (comments)    Bactrim [Sulfamethoxazole-Trimethoprim] Rash and Unknown (comments)    Bromfenac Rash and Unknown (comments)    Cefepime Other (comments)     Neurological reaction    Ceftriaxone Rash    Copper Rash    Ibuprofen Rash and Unknown (comments)    Ketorolac Tromethamine Rash and Unknown (comments)    Relafen [Nabumetone] Rash and Unknown (comments)    Rifampin Rash and Unknown (comments)    Vancomycin Rash and Unknown (comments)     Pt reports causes itching, takes benadryl prior to use. Pt denies anaphylaxis. Requires benadryl with each vancomycin dose     Review of Systems   Review of Systems   Constitutional: Negative. HENT: Negative. Respiratory: Negative. Cardiovascular: Negative. Gastrointestinal:  Positive for abdominal pain, diarrhea, nausea and vomiting. Genitourinary: Negative. Neurological: Negative. All other systems reviewed and are negative. Physical Exam   Physical Exam  Vitals and nursing note reviewed. Constitutional:       Appearance: Normal appearance. HENT:      Head: Normocephalic. Eyes:      Extraocular Movements: Extraocular movements intact. Pupils: Pupils are equal, round, and reactive to light. Cardiovascular:      Rate and Rhythm: Normal rate and regular rhythm. Pulmonary:      Effort: Pulmonary effort is normal.      Breath sounds: Normal breath sounds. Abdominal:      Palpations: Abdomen is soft. Tenderness: There is abdominal tenderness. Comments: Periumbilical tenderness. Neurological:      General: No focal deficit present. Mental Status: She is alert and oriented to person, place, and time.        Lab and Diagnostic Study Results   Labs -     Recent Results (from the past 12 hour(s))   METABOLIC PANEL, COMPREHENSIVE    Collection Time: 09/11/22  3:23 AM   Result Value Ref Range    Sodium 134 (L) 136 - 145 mmol/L    Potassium 6.0 (H) 3.5 - 5.5 mmol/L    Chloride 103 100 - 111 mmol/L    CO2 16 (L) 21 - 32 mmol/L    Anion gap 15 3.0 - 18.0 mmol/L    Glucose 91 74 - 99 mg/dL    BUN 47 (H) 7 - 18 mg/dL    Creatinine 10.60 (H) 0.60 - 1.30 mg/dL    BUN/Creatinine ratio 4 (L) 12 - 20      GFR est AA 4 (L) >60 ml/min/1.73m2    GFR est non-AA 4 (L) >60 ml/min/1.73m2    Calcium 7.9 (L) 8.5 - 10.1 mg/dL    Bilirubin, total 0.5 0.2 - 1.0 mg/dL    AST (SGOT) 21 10 - 38 U/L    ALT (SGPT) 19 13 - 56 U/L    Alk. phosphatase 76 45 - 117 U/L    Protein, total 6.5 6.4 - 8.2 g/dL    Albumin 2.8 (L) 3.4 - 5.0 g/dL    Globulin 3.7 2.0 - 4.0 g/dL    A-G Ratio 0.8 0.8 - 1.7     LIPASE    Collection Time: 09/11/22  3:23 AM   Result Value Ref Range    Lipase 761 (H) 73 - 393 U/L       Radiologic Studies -   [unfilled]  CT Results  (Last 48 hours)                 09/10/22 1812  CT ABD PELV WO CONT Final result    Impression:      Atrophic native kidneys. Transplant kidney right lower quadrant demonstrating   perinephric stranding similar to the prior study. Lack of contrast limits   evaluation for pyelonephritis. There is no hydronephrosis however. Mild irregularity of the right colonic margins suggests possible colitis. Clinically correlate. Extensive colonic diverticula are present without evidence of acute   diverticulitis or obstruction. Narrative:  EXAM: CT of the abdomen and pelvis       INDICATION: Vomiting. Abdominal pain       COMPARISON: 9/6/2022       TECHNIQUE: Axial CT imaging of the abdomen and pelvis was performed without   intravenous or oral contrast. Multiplanar reformats were generated. One or more dose reduction techniques were used on this CT: automated exposure   control, adjustment of the mAs and/or kVp according to patient's size, and   iterative reconstruction techniques. The specific techniques utilized on this CT   exam have been documented in the patient's electronic medical record.    Digital Imaging and Communications in Medicine (DICOM) format image data are   available to nonaffiliated external healthcare facilities or entities on a   secure, media free, reciprocally searchable basis with patient authorization for   at least a 12-month period after this study. _______________       FINDINGS:       LOWER CHEST: Interval development of trace right pleural effusions since the   prior study. LIVER, BILIARY: Liver is normal. No biliary dilation. Cholecystectomy. PANCREAS: Atrophic. SPLEEN: Normal.       ADRENALS: Normal.       KIDNEYS/URETERS/BLADDER: Bilateral atrophic kidneys with cysts. PELVIC ORGANS: Right pelvic kidney demonstrates cystic mass with adjacent   perinephric stranding. There is no hydronephrosis. VASCULATURE: Prominent atherosclerotic calcifications noted without aneurysm. LYMPH NODES: No enlarged lymph nodes. GASTROINTESTINAL TRACT: Multiple colonic diverticula are present. Mild   irregularity of the right colonic margins suggests the possibility of possible   colitis but without obstruction. Appendix appears normal.       BONES: Degenerative changes are seen throughout the spine with partial fusion   seen in the thoracic segments. OTHER: Atrophic uterus. _______________                 CXR Results  (Last 48 hours)                 09/10/22 2000  XR CHEST PORT Final result    Impression:      No active cardiopulmonary disease. Narrative:  EXAM: CHEST RADIOGRAPH, SINGLE VIEW       CLINICAL INDICATION/HISTORY: ESRD, missed dialysis, shortness of breath       COMPARISON: 8/28/2022       TECHNIQUE: Portable frontal view of the chest was obtained.        _______________       FINDINGS:       SUPPORT DEVICES: None. HEART AND MEDIASTINUM: Cardiomediastinal silhouette appears mildly enlarged,   unchanged. Normal caliber thoracic aorta. No central vascular congestion. LUNGS AND PLEURAL SPACES: Lungs are well aerated with no confluent airspace   opacity. No pleural effusion or pneumothorax. BONY THORAX AND SOFT TISSUES: No acute osseous abnormality. _______________                   Medical Decision Making and ED Course   Differential Diagnosis & Medical Decision Making Provider Note:       - I am the first and primary provider for this patient. I reviewed the vital signs, available nursing notes, past medical history, past surgical history, family history and social history. The patient's presenting problems have been discussed, and the staff are in agreement with the care plan formulated and outlined with them. I have encouraged them to ask questions as they arise throughout their visit. Vital Signs-Reviewed the patient's vital signs. Patient Vitals for the past 12 hrs:   Temp Pulse Resp BP SpO2   09/11/22 0327 98.5 °F (36.9 °C) 80 20 (!) 155/75 98 %   09/10/22 2240 -- -- -- -- 99 %   09/10/22 2226 97.6 °F (36.4 °C) 77 20 129/66 99 %   09/10/22 2158 -- 82 16 (!) 157/74 99 %   09/10/22 2028 -- 80 16 (!) 143/65 99 %       ED Course:   ED Course as of 09/11/22 0728   Sat Sep 10, 2022   1914 IMPRESSION     Atrophic native kidneys. Transplant kidney right lower quadrant demonstrating  perinephric stranding similar to the prior study. Lack of contrast limits  evaluation for pyelonephritis. There is no hydronephrosis however. Mild irregularity of the right colonic margins suggests possible colitis. Clinically correlate. Extensive colonic diverticula are present without evidence of acute  diverticulitis or obstruction. [OM]   2023 Patient is oxygenating well, no SOB. BNP is high but likely due to CKD, will FU to dialysis Monday. Return to ED if dyspnea develops. [OM]      ED Course User Index  [OM] Vernia Riedel, DO           Procedures and Critical Care     Performed by: Jose Alfredo Lagunas MD  Procedures        Disposition: Admitted to Floor Medical Floor the case was discussed with the admitting physician Dr Sandra Hemphill:  1.    Current Discharge Medication List        CONTINUE these medications which have NOT CHANGED    Details   amoxicillin-clavulanate (Augmentin) 875-125 mg per tablet Take 1 Tablet by mouth two (2) times a day. Qty: 20 Tablet, Refills: 0      metroNIDAZOLE (FlagyL) 500 mg tablet Take 1 Tablet by mouth three (3) times daily for 10 days. Qty: 30 Tablet, Refills: 0      polyethylene glycol (Miralax) 17 gram packet Take 1 Packet by mouth daily. Qty: 10 Each, Refills: 0      cinacalcet (SENSIPAR) 30 mg tablet Take 30 mg by mouth daily. clopidogreL (PLAVIX) 75 mg tab Take 75 mg by mouth daily. losartan (COZAAR) 50 mg tablet Take 1 Tablet by mouth daily. Qty: 90 Tablet, Refills: 1    Associated Diagnoses: Hypertension, unspecified type      cyclobenzaprine (FLEXERIL) 5 mg tablet Take 1 Tablet by mouth three (3) times daily as needed for Muscle Spasm(s). Qty: 180 Tablet, Refills: 0      amiodarone (CORDARONE) 200 mg tablet TAKE 1 TABLET BY MOUTH EVERY DAY  Qty: 90 Tablet, Refills: 1      amLODIPine (NORVASC) 10 mg tablet Take 1 tablet by mouth once daily  Qty: 90 Tablet, Refills: 1    Associated Diagnoses: Hypertension, unspecified type      carvediloL (COREG) 25 mg tablet Take 1 Tablet by mouth two (2) times daily (with meals). Qty: 180 Tablet, Refills: 1      meclizine (ANTIVERT) 25 mg tablet Take 1 Tablet by mouth three (3) times daily as needed for Dizziness. Qty: 90 Tablet, Refills: 1    Associated Diagnoses: Stenosis of left vertebral artery      dexlansoprazole (Dexilant) 60 mg CpDB capsule (delayed release) Take 1 Capsule by mouth in the morning. Qty: 90 Capsule, Refills: 0    Associated Diagnoses: Gastroesophageal reflux disease with esophagitis without hemorrhage      fluticasone propionate (FLONASE) 50 mcg/actuation nasal spray Take 1 [place] by Both Nostrils route in the morning. Qty: 1 Each, Refills: 1      levothyroxine (SYNTHROID) 75 mcg tablet Take 1 Tablet by mouth Daily (before breakfast).   Qty: 90 Tablet, Refills: 1      simvastatin (ZOCOR) 20 mg tablet TAKE 1 TABLET BY MOUTH DAILY AS DIRECTED. Qty: 90 Tablet, Refills: 1      fluticasone-umeclidinium-vilanterol (Trelegy Ellipta) 100-62.5-25 mcg inhaler Take 1 Puff by inhalation in the morning. Qty: 60 Each, Refills: 1      valGANciclovir (VALCYTE) 450 mg tablet Take 2 Tablets by mouth in the morning. Qty: 180 Tablet, Refills: 1    Associated Diagnoses: ESRD (end stage renal disease) on dialysis ([facility])      dicyclomine (BENTYL) 10 mg capsule Take 1 Capsule by mouth in the morning. Qty: 90 Capsule, Refills: 1      montelukast (SINGULAIR) 10 mg tablet Take 1 Tablet by mouth in the morning. Qty: 90 Tablet, Refills: 1      hyoscyamine SL (LEVSIN/SL) 0.125 mg SL tablet 1 Tablet by SubLINGual route every four (4) hours as needed (irritable bowel). Qty: 30 Tablet, Refills: 2    Associated Diagnoses: Gastroesophageal reflux disease with esophagitis without hemorrhage      sucralfate (CARAFATE) 1 gram tablet TAKE 1 TABLET BY MOUTH FOUR TIMES DAILY  Qty: 360 Tablet, Refills: 1      apixaban (ELIQUIS) 2.5 mg tablet Take 1 Tablet by mouth two (2) times a day. Qty: 180 Tablet, Refills: 1      lidocaine-prilocaine (EMLA) topical cream APPLY SMALL AMOUNT TO ACCESS SITE (AVF) 1 TO 2 HOURS BEFORE DIALYSIS. COVER WITH OCCLUSIVE DRESSING (SARAN WRAP)      zolpidem (AMBIEN) 5 mg tablet Take 1 Tablet by mouth nightly as needed for Sleep. Max Daily Amount: 5 mg. Qty: 30 Tablet, Refills: 0    Associated Diagnoses: Primary insomnia      ondansetron (ZOFRAN ODT) 4 mg disintegrating tablet Take 1 Tablet by mouth every eight (8) hours as needed for Nausea or Nausea or Vomiting. Qty: 90 Tablet, Refills: 1      predniSONE (DELTASONE) 5 mg tablet Take 1 Tablet by mouth daily.       albuterol (PROVENTIL VENTOLIN) 2.5 mg /3 mL (0.083 %) nebu USE 1 VIAL VIA NEBULIZER THREE TIMES DAILY  Qty: 90 mL, Refills: 3    Associated Diagnoses: Mild intermittent asthma without complication calcitRIOL (ROCALTROL) 0.5 mcg capsule Take 0.5 mcg by mouth as directed. Take on non dialysis days      RenaPlex-D 800 mcg-12.5 mg -2,000 unit tab Take 1 Tablet by mouth daily. sevelamer carbonate (RENVELA) 800 mg tab tab Take 800 mg by mouth three (3) times daily. aluminum & magnesium hydroxide-simethicone (Maalox Maximum Strength) 400-400-40 mg/5 mL suspension Take 10 mL by mouth every six (6) hours as needed for Indigestion. Qty: 250 mL, Refills: 0      ESTRACE 0.01 % (0.1 mg/gram) vaginal cream INSERT 2 GRAMS INTO VAGINA EVERY MONDAY AND THURSDAY  Qty: 42.5 g, Refills: 5      cranberry extract 425 mg cap Take 425 mg by mouth daily. STOP taking these medications       HYDROcodone-acetaminophen (Norco) 5-325 mg per tablet Comments:   Reason for Stoppin.   Follow-up Information       Follow up With Specialties Details Why Contact Info    José Miguel Magaña MD Family Medicine In 3 days  [id number] [facility]  [facility]  [phone]            3. Return to ED if worse   4. Current Discharge Medication List        CONTINUE these medications which have CHANGED    Details   ondansetron (ZOFRAN ODT) 4 mg disintegrating tablet Take 2 Tablets by mouth every eight (8) hours as needed for Nausea or Nausea or Vomiting. Qty: 15 Tablet, Refills: 0  Start date: 9/10/2022           Remove if admitted/discharged    Diagnosis/Clinical Impression     Clinical Impression:   1. Noninfectious gastroenteritis, unspecified type    2. Nausea and vomiting, unspecified vomiting type    3. Hypokalemia    4. ESR[facility] ([facility]        Attestations: Blaire Braxton MD, am the primary clinician of record. Please note that this dictation was completed with [employer], the computer voice recognition software. Quite often unanticipated grammatical, syntax, homophones, and other interpretive errors are inadvertently transcribed by the computer software.   Please disregard these errors. Please excuse any errors that have escaped final proofreading. Thank you.

## 2022-09-11 PROBLEM — E44.0 MODERATE PROTEIN-CALORIE MALNUTRITION (HCC): Status: ACTIVE | Noted: 2022-09-11

## 2022-09-11 LAB
ALBUMIN SERPL-MCNC: 2.8 G/DL (ref 3.4–5)
ALBUMIN SERPL-MCNC: 2.9 G/DL (ref 3.4–5)
ALBUMIN/GLOB SERPL: 0.7 {RATIO} (ref 0.8–1.7)
ALBUMIN/GLOB SERPL: 0.8 {RATIO} (ref 0.8–1.7)
ALP SERPL-CCNC: 76 U/L (ref 45–117)
ALP SERPL-CCNC: 92 U/L (ref 45–117)
ALT SERPL-CCNC: 19 U/L (ref 13–56)
ALT SERPL-CCNC: 20 U/L (ref 13–56)
ANION GAP SERPL CALC-SCNC: 12 MMOL/L (ref 3–18)
ANION GAP SERPL CALC-SCNC: 15 MMOL/L (ref 3–18)
AST SERPL W P-5'-P-CCNC: 21 U/L (ref 10–38)
AST SERPL W P-5'-P-CCNC: 24 U/L (ref 10–38)
BASOPHILS # BLD: 0 K/UL (ref 0–0.1)
BASOPHILS NFR BLD: 1 % (ref 0–2)
BILIRUB SERPL-MCNC: 0.5 MG/DL (ref 0.2–1)
BILIRUB SERPL-MCNC: 0.6 MG/DL (ref 0.2–1)
BUN SERPL-MCNC: 20 MG/DL (ref 7–18)
BUN SERPL-MCNC: 47 MG/DL (ref 7–18)
BUN/CREAT SERPL: 3 (ref 12–20)
BUN/CREAT SERPL: 4 (ref 12–20)
CA-I BLD-MCNC: 7.9 MG/DL (ref 8.5–10.1)
CA-I BLD-MCNC: 8.4 MG/DL (ref 8.5–10.1)
CHLORIDE SERPL-SCNC: 103 MMOL/L (ref 100–111)
CHLORIDE SERPL-SCNC: 98 MMOL/L (ref 100–111)
CO2 SERPL-SCNC: 16 MMOL/L (ref 21–32)
CO2 SERPL-SCNC: 26 MMOL/L (ref 21–32)
CREAT SERPL-MCNC: 10.6 MG/DL (ref 0.6–1.3)
CREAT SERPL-MCNC: 6.33 MG/DL (ref 0.6–1.3)
DIFFERENTIAL METHOD BLD: ABNORMAL
EOSINOPHIL # BLD: 0.1 K/UL (ref 0–0.4)
EOSINOPHIL NFR BLD: 1 % (ref 0–5)
ERYTHROCYTE [DISTWIDTH] IN BLOOD BY AUTOMATED COUNT: 18.4 % (ref 11.6–14.5)
GLOBULIN SER CALC-MCNC: 3.7 G/DL (ref 2–4)
GLOBULIN SER CALC-MCNC: 3.9 G/DL (ref 2–4)
GLUCOSE SERPL-MCNC: 70 MG/DL (ref 74–99)
GLUCOSE SERPL-MCNC: 91 MG/DL (ref 74–99)
HCT VFR BLD AUTO: 37.4 % (ref 35–45)
HGB BLD-MCNC: 11.8 G/DL (ref 12–16)
IMM GRANULOCYTES # BLD AUTO: 0.1 K/UL (ref 0–0.04)
IMM GRANULOCYTES NFR BLD AUTO: 1 % (ref 0–0.5)
LIPASE SERPL-CCNC: 761 U/L (ref 73–393)
LYMPHOCYTES # BLD: 1.5 K/UL (ref 0.9–3.6)
LYMPHOCYTES NFR BLD: 19 % (ref 21–52)
MCH RBC QN AUTO: 32.5 PG (ref 24–34)
MCHC RBC AUTO-ENTMCNC: 31.6 G/DL (ref 31–37)
MCV RBC AUTO: 103 FL (ref 78–100)
MONOCYTES # BLD: 1.5 K/UL (ref 0.05–1.2)
MONOCYTES NFR BLD: 19 % (ref 3–10)
NEUTS SEG # BLD: 4.5 K/UL (ref 1.8–8)
NEUTS SEG NFR BLD: 59 % (ref 40–73)
NRBC # BLD: 0 K/UL (ref 0–0.01)
NRBC BLD-RTO: 0 PER 100 WBC
PLATELET # BLD AUTO: 207 K/UL (ref 135–420)
PMV BLD AUTO: 9.2 FL (ref 9.2–11.8)
POTASSIUM SERPL-SCNC: 4.4 MMOL/L (ref 3.5–5.5)
POTASSIUM SERPL-SCNC: 6 MMOL/L (ref 3.5–5.5)
PROT SERPL-MCNC: 6.5 G/DL (ref 6.4–8.2)
PROT SERPL-MCNC: 6.8 G/DL (ref 6.4–8.2)
RBC # BLD AUTO: 3.63 M/UL (ref 4.2–5.3)
SODIUM SERPL-SCNC: 134 MMOL/L (ref 136–145)
SODIUM SERPL-SCNC: 136 MMOL/L (ref 136–145)
WBC # BLD AUTO: 7.6 K/UL (ref 4.6–13.2)

## 2022-09-11 PROCEDURE — 80053 COMPREHEN METABOLIC PANEL: CPT

## 2022-09-11 PROCEDURE — 90935 HEMODIALYSIS ONE EVALUATION: CPT

## 2022-09-11 PROCEDURE — 83690 ASSAY OF LIPASE: CPT

## 2022-09-11 PROCEDURE — 74011000250 HC RX REV CODE- 250: Performed by: STUDENT IN AN ORGANIZED HEALTH CARE EDUCATION/TRAINING PROGRAM

## 2022-09-11 PROCEDURE — 74011250637 HC RX REV CODE- 250/637: Performed by: NURSE PRACTITIONER

## 2022-09-11 PROCEDURE — 96376 TX/PRO/DX INJ SAME DRUG ADON: CPT

## 2022-09-11 PROCEDURE — 94761 N-INVAS EAR/PLS OXIMETRY MLT: CPT

## 2022-09-11 PROCEDURE — G0378 HOSPITAL OBSERVATION PER HR: HCPCS

## 2022-09-11 PROCEDURE — 96375 TX/PRO/DX INJ NEW DRUG ADDON: CPT

## 2022-09-11 PROCEDURE — 74011250636 HC RX REV CODE- 250/636: Performed by: FAMILY MEDICINE

## 2022-09-11 PROCEDURE — 36415 COLL VENOUS BLD VENIPUNCTURE: CPT

## 2022-09-11 PROCEDURE — 94640 AIRWAY INHALATION TREATMENT: CPT

## 2022-09-11 PROCEDURE — 74011000258 HC RX REV CODE- 258: Performed by: FAMILY MEDICINE

## 2022-09-11 PROCEDURE — 74011250636 HC RX REV CODE- 250/636: Performed by: SPECIALIST

## 2022-09-11 PROCEDURE — 85025 COMPLETE CBC W/AUTO DIFF WBC: CPT

## 2022-09-11 PROCEDURE — 74011250636 HC RX REV CODE- 250/636: Performed by: NURSE PRACTITIONER

## 2022-09-11 PROCEDURE — 74011000250 HC RX REV CODE- 250: Performed by: NURSE PRACTITIONER

## 2022-09-11 RX ORDER — MORPHINE SULFATE 2 MG/ML
2 INJECTION, SOLUTION INTRAMUSCULAR; INTRAVENOUS
Status: DISPENSED | OUTPATIENT
Start: 2022-09-11 | End: 2022-09-13

## 2022-09-11 RX ORDER — ONDANSETRON 2 MG/ML
4 INJECTION INTRAMUSCULAR; INTRAVENOUS
Status: DISCONTINUED | OUTPATIENT
Start: 2022-09-11 | End: 2022-09-14 | Stop reason: HOSPADM

## 2022-09-11 RX ORDER — SODIUM CHLORIDE 9 MG/ML
2000 INJECTION, SOLUTION INTRAVENOUS ONCE
Status: COMPLETED | OUTPATIENT
Start: 2022-09-11 | End: 2022-09-11

## 2022-09-11 RX ORDER — SODIUM CHLORIDE 9 MG/ML
50 INJECTION, SOLUTION INTRAVENOUS CONTINUOUS
Status: DISCONTINUED | OUTPATIENT
Start: 2022-09-11 | End: 2022-09-12

## 2022-09-11 RX ADMIN — MORPHINE SULFATE 2 MG: 2 INJECTION, SOLUTION INTRAMUSCULAR; INTRAVENOUS at 02:42

## 2022-09-11 RX ADMIN — IPRATROPIUM BROMIDE AND ALBUTEROL SULFATE 3 ML: .5; 3 SOLUTION RESPIRATORY (INHALATION) at 19:21

## 2022-09-11 RX ADMIN — SODIUM CHLORIDE, PRESERVATIVE FREE 10 ML: 5 INJECTION INTRAVENOUS at 05:30

## 2022-09-11 RX ADMIN — ONDANSETRON 4 MG: 2 INJECTION INTRAMUSCULAR; INTRAVENOUS at 13:06

## 2022-09-11 RX ADMIN — SODIUM CHLORIDE, PRESERVATIVE FREE 10 ML: 5 INJECTION INTRAVENOUS at 16:25

## 2022-09-11 RX ADMIN — IPRATROPIUM BROMIDE AND ALBUTEROL SULFATE 3 ML: .5; 3 SOLUTION RESPIRATORY (INHALATION) at 14:31

## 2022-09-11 RX ADMIN — CARVEDILOL 25 MG: 12.5 TABLET, FILM COATED ORAL at 18:53

## 2022-09-11 RX ADMIN — PIPERACILLIN AND TAZOBACTAM 3.38 G: 3; .375 INJECTION, POWDER, FOR SOLUTION INTRAVENOUS at 02:43

## 2022-09-11 RX ADMIN — IPRATROPIUM BROMIDE AND ALBUTEROL SULFATE 3 ML: .5; 3 SOLUTION RESPIRATORY (INHALATION) at 07:56

## 2022-09-11 RX ADMIN — SODIUM CHLORIDE 2000 ML: 9 INJECTION, SOLUTION INTRAVENOUS at 13:55

## 2022-09-11 RX ADMIN — MORPHINE SULFATE 2 MG: 2 INJECTION, SOLUTION INTRAMUSCULAR; INTRAVENOUS at 13:06

## 2022-09-11 RX ADMIN — ONDANSETRON 4 MG: 2 INJECTION INTRAMUSCULAR; INTRAVENOUS at 18:58

## 2022-09-11 RX ADMIN — SODIUM CHLORIDE, PRESERVATIVE FREE 10 ML: 5 INJECTION INTRAVENOUS at 21:52

## 2022-09-11 RX ADMIN — SODIUM CHLORIDE 50 ML/HR: 9 INJECTION, SOLUTION INTRAVENOUS at 21:51

## 2022-09-11 RX ADMIN — BUDESONIDE 250 MCG: 0.25 SUSPENSION RESPIRATORY (INHALATION) at 19:21

## 2022-09-11 RX ADMIN — APIXABAN 2.5 MG: 2.5 TABLET, FILM COATED ORAL at 18:53

## 2022-09-11 RX ADMIN — SEVELAMER CARBONATE 800 MG: 800 TABLET, FILM COATED ORAL at 21:51

## 2022-09-11 RX ADMIN — BUDESONIDE 250 MCG: 0.25 SUSPENSION RESPIRATORY (INHALATION) at 07:56

## 2022-09-11 RX ADMIN — SODIUM CHLORIDE 50 ML/HR: 9 INJECTION, SOLUTION INTRAVENOUS at 02:14

## 2022-09-11 RX ADMIN — ATORVASTATIN CALCIUM 10 MG: 10 TABLET, FILM COATED ORAL at 21:52

## 2022-09-11 RX ADMIN — ONDANSETRON 4 MG: 2 INJECTION INTRAMUSCULAR; INTRAVENOUS at 02:42

## 2022-09-11 RX ADMIN — MORPHINE SULFATE 2 MG: 2 INJECTION, SOLUTION INTRAMUSCULAR; INTRAVENOUS at 08:15

## 2022-09-11 RX ADMIN — MORPHINE SULFATE 2 MG: 2 INJECTION, SOLUTION INTRAMUSCULAR; INTRAVENOUS at 18:58

## 2022-09-11 RX ADMIN — ONDANSETRON 4 MG: 2 INJECTION INTRAMUSCULAR; INTRAVENOUS at 08:15

## 2022-09-11 NOTE — PROGRESS NOTES
Progress Note    Patient: Wai Cousin MRN: [id number]  SSN: [id number]    YOB: 1955  Age: 74 y.o. Sex: female      Admit Date: 9/10/2022    LOS: 0 days     Subjective:     CC:\" Nauseous\"    Patient is still not tolerating PO intake and is refusing oral medications this morning. She is still feeling nauseous. Her diarrhea has resolved. Abdominal pian has been around the same. Potassium was also noted to be at 6.0 this morning. Nephrology was consulted and she will receive dialysis today. Objective:     Vitals:    09/11/22 0327 09/11/22 0800 09/11/22 0820 09/11/22 0827   BP: (!) 155/75   118/60   Pulse: 80 95  93   Resp: 20   18   Temp: 98.5 °F (36.9 °C)   98.6 °F (37 °C)   SpO2: 98%  97% 100%   Weight:       Height:            Intake and Output:  Current Shift: No intake/output data recorded. Last three shifts: No intake/output data recorded. Physical Exam:   GENERAL: alert, cooperative, no distress, appears stated age  LUNG: clear to auscultation bilaterally  HEART: regular rate and rhythm, S1, S2 normal, no murmur, click, rub or gallop  ABDOMEN: soft, non-tender. Bowel sounds normal. No masses,  no organomegaly  EXTREMITIES:  extremities normal, atraumatic, no cyanosis or edema  SKIN: no rash or abnormalities  NEUROLOGIC: negative    Lab/Data Review: All lab results for the last 24 hours reviewed. Assessment:     Active Problems:    ESRD (end stage renal disease) on dialysis ([facility] (12/28/2020)      Weakness (4/7/2022)      Colitis (9/10/2022)      Intractable nausea and vomiting (9/10/2022)        Plan:     #1 colitis:  -Was recently admitted and returned to the ED for similar symptoms.  -Has been treated for acute diverticulitis recently and pancreatitis. -Unclear if she is followed up with GI outpatient. Would likely benefit from inpatient consultation if she remains in hospital for several days.   -CT abdomen showed mild irregularity of the right colonic margins suggestive of possible colitis, extensive colonic diverticula present without evidence of acute diverticulitis or obstruction.  -Lipase is 768, no evidence of acute pancreatitis on CT  -ALT 13, AST 11, alk phosphatase 62  -N.p.o. except for meds  -We will start on gentle IV fluid hydration of normal saline at 50 mils per hour  -Order serum electrolytes  -Supportive care with as needed medication for pain and nausea  -Start clear liquid diet in 24 to 48 hours and advance as tolerated  -CMP and lipase in a.m.  -Stool to rule out C. Difficile  -Contact precautions  Discontinue antibiotics since she was already recently treated for diverticulitis and she only shows mild colitis on the right side and does not have diverticulitis. She has recurrent chronic abdominal pain and was referred to a surgeon as an outpatient to be considered for a segmental resection of her left colon but per patient, there are no plans for surgical intervention at this time. Will order mesenteric duplex to rule out mesenteric ischemia since she does have history of atrial fibrillation and is at high risk for arterial embolism. Advance diet as tolerated. She will also receive dialysis today due to hyperkalemia     #2. Hyperkalemia  Will receive HD today. Continue potassium lowering protocol PRN. Repeat potassium levels now. #3 essential hypertension:  -Chronic condition, controlled  -Continue Coreg, Norvasc, Cozaar  -Monitor blood pressure closely     #4 hyperlipidemia:  -Chronic, continue statin     #5 atrial fibrillation:  -Chronic, controlled  -Continue Coreg.  amiodarone and Eliquis     #6 ESRD on dialysis:  -Chronic  -Consult Dr. Eliezer Mead for hemodialysis  -Usual hemodialysis days are Monday and Friday  -Repeat renal panel in a.m.  HD today due to hyperkalemia     #9 hypothyroidism:  -Chronic  -Continue Synthroid     #10 GERD:  -Chronic  -Continue Dexilant    Signed By: Jarred King MD     September 11, 2022

## 2022-09-11 NOTE — PROGRESS NOTES
[id number] Peters nephrologist on call paged, waiting callback. Notified [facility] of patient needing HD today, will send HD nurse today, unknown ETA at this time. [id number]: Dr Nimesh Hsu with [facility] called back. Discussed patient care and labs. Will place HD orders.

## 2022-09-11 NOTE — RT PROTOCOL NOTE
RT Nebulizer Bronchodilator Protocol Note    There is a bronchodilator order in the chart from a provider indicating to follow the RT Bronchodilator Protocol and there is an Initiate RT Bronchodilator Protocol order as well (see protocol at bottom of note). CXR Findings:  Recent Results (from the past 2 days)    XR CHEST PORT 09/10/2022    Impression  No active cardiopulmonary disease. The findings from the last RT Protocol Assessment were as follows:  History of Pulmonary Disease: Chronic pulmonary disease  Respiratory Pattern: Regular pattern and RR 12-20 bpm  Breath Sounds: Clear breath sounds  Cough: Strong, spontaneous, non-productive  Indication for Bronchodilator Therapy: On home bronchodilators  Bronchodilator Assessment Score: 2     Aerosolized bronchodilator medication orders have been revised according to the RT Nebulizer Bronchodilator Protocol below. Respiratory Therapist to perform RT Therapy Protocol Assessment initially then follow the protocol. Repeat RT Therapy Protocol Assessment PRN for score 0-3 or on second treatment, BID, and PRN for scores above 3. No Indications - adjust the frequency to every 6 hours PRN wheezing or bronchospasm, if no treatments needed after 48 hours then discontinue using Per Protocol order mode. If indication present, adjust the RT bronchodilator orders based on the Bronchodilator Assessment Score as indicated below. If a patient is on this medication at home then do not decrease Frequency below that used at home. 0-3 - enter or revise RT bronchodilator order(s) to equivalent RT Bronchodilator order with Frequency of every 4 hours PRN for wheezing or increased work of breathing using Per Protocol order mode.         4-6 - enter or revise RT Bronchodilator order(s) to two equivalent RT bronchodilator orders with one order with BID Frequency and one order with Frequency of every 4 hours PRN wheezing or increased work of breathing using Per Protocol order mode. 7-10 - enter or revise RT Bronchodilator order(s) to two equivalent RT bronchodilator orders with one order with TID Frequency and one order with Frequency of every 4 hours PRN wheezing or increased work of breathing using Per Protocol order mode. 11-13 - enter or revise RT Bronchodilator order(s) to one equivalent RT bronchodilator order with QID Frequency and an Albuterol order with Frequency of every 4 hours PRN wheezing or increased work of breathing using Per Protocol order mode. Greater than 13 - enter or revise RT Bronchodilator order(s) to one equivalent RT bronchodilator order with every 4 hours Frequency and an Albuterol order with Frequency of every 2 hours PRN wheezing or increased work of breathing using Per Protocol order mode. RT to enter RT Home Evaluation for COPD & MDI Assessment order using Per Protocol order mode.     Electronically signed by Yvrose Moulton on 9/10/2022 at 10:44 PM

## 2022-09-11 NOTE — ROUTINE PROCESS
Assumed care of pt from ER, A&OX4. Vital signs stable and telemetry SR. PIV site clean,dry,intact and patent. Medications tolerated without difficultly. Lungs clear,SAT 99% r/a. ABD soft with + BS. LBM 9/9/22. Pt oliguria,no urine voided this shift. Skin warm,dry and intact. +1 edema to B/L upper and lower extremities. No c/o pain or discomfort at this time. In bed resting.

## 2022-09-11 NOTE — PROGRESS NOTES
0700 Received report from [facility]. Patient resting in bed with eyes closed. No s/s of pain or discomfort. No SOB or dyspnea noted. Call bell in reach with bed in lowest position. 0800 RT informed nurse that patient requesting nausea and pain medication. Patient c/o 9/10 pain in her upper abdomen. Medications administered per order. Refusing PO meds at this time. Will try again later. No other needs voiced. Call bell in reach with bed in lowest position. 1100 Patient resting in bed with eyes closed. No s/s of pain or discomfort. No SOB or dyspnea noted. Call bell in reach with bed in lowest position. Jillian Juan 78 present and performing dialysis in patient's room. [address] called for two warm blankets for patient. When brought to room, [facility] states patient is c/o 8/10 pain in abd. Administered Zofran and Morphine per order. No other needs voiced. Dialysis still running and [facility] at bedside. [address] [facility] states patient's HR is a little over 100. Wanted to make sure nurse was aware. Came and checked tele monitor, and no significant change noted. [facility] states she will continue to monitor. 1530 Dialysis completed and [facility] leaving bedside. Call bell in reach and bed in lowest position. 1900 Patient requesting Zofran and Morphine. Administered per MAR. No other needs voiced. Call bell in reach with bed in lowest position.

## 2022-09-11 NOTE — ROUTINE PROCESS
TRANSFER - IN REPORT:    Verbal report received from Asuncion Betancur RN(name) on Severa Solian  being received from ED(unit) for routine progression of care      Report consisted of patients Situation, Background, Assessment and   Recommendations(SBAR). Information from the following report(s) SBAR, Kardex, ED Summary, Intake/Output, MAR, Recent Results, and Cardiac Rhythm SR  was reviewed with the receiving nurse. Opportunity for questions and clarification was provided. Assessment completed upon patients arrival to unit and care assumed.

## 2022-09-11 NOTE — PROGRESS NOTES
Care Management Interventions  PCP Verified by CM: Yes  Palliative Care Criteria Met (RRAT>21 & CHF Dx)?: No  Mode of Transport at Discharge: Other (see comment) (Family)  Transition of Care Consult (CM Consult): Discharge Planning  MyChart Signup: No  Discharge Durable Medical Equipment: No  Health Maintenance Reviewed: Yes  Physical Therapy Consult: Yes  Occupational Therapy Consult: Yes  Speech Therapy Consult: No  Support Systems: Child(lilibeth)  Confirm Follow Up Transport: Family  The Patient and/or Patient Representative was Provided with a Choice of Provider and Agrees with the Discharge Plan?: Yes  Freedom of Choice List was Provided with Basic Dialogue that Supports the Patient's Individualized Plan of Care/Goals, Treatment Preferences and Shares the Quality Data Associated with the Providers?: Yes  Discharge Location  Patient Expects to be Discharged to[de-identified] Home  Reason for Admission: Chart reviewed and noted patient presents with C/O abdominal pain as well as nausea, vomiting and loose stools. Patient states she has had abdominal pain for about the last month. She states she is unable to keep medication down. She is also having generalized weakness, feeling worse and her last dialysis was on 9/6. BUN- 37, Creatinine- 7.80, CT of the abdomen pelvis shows mild irregularity of the right colonic margins suggestive of possible colitis.      Dx: Colitis     PMH: Diverticulitis, ESRD with Renal Transplant- Dialysis on Mondays and Fridays, COPD, HTN, Hypothyroidism, Hyperlipidemia                      RUR Score: NA                    Plan for utilizing home health: TBD         PCP: First and Last name:  Olivia Vazquez MD     Name of Practice:    Are you a current patient: Yes/No:    Approximate date of last visit:    Can you participate in a virtual visit with your PCP:                     Current Advanced Directive/Advance Care Plan: Full Code- No ACP      Healthcare Decision Maker: Daughter- Charlotte Lindsey- [phone]   Click here to complete [employer] including selection of the Healthcare Decision Maker Relationship (ie \"Primary\")             Primary Decision Maker: Pola Gillespie - Daughter - [phone]                  Transition of Care Plan: Patient lives at home with her sister and has a Nebulizer Machine. Teach Back and Medication Reconciliation will be completed. Nurse will make follow-up appointments. Patient will be returning back home with her sister at discharge.

## 2022-09-11 NOTE — ROUTINE PROCESS
TRANSFER - OUT REPORT:    Verbal report given to Rene (name) on Maria Eugenia Black  being transferred to [address] [place] [address] (unit) for routine progression of care       Report consisted of patients Situation, Background, Assessment and   Recommendations(SBAR). Information from the following report(s) SBAR, ED Summary, and MAR was reviewed with the receiving nurse. Lines:   Peripheral IV 09/10/22 Right Other(comment) (Active)   Site Assessment Clean, dry, & intact 09/10/22 1829   Phlebitis Assessment 0 09/10/22 1829   Infiltration Assessment 0 09/10/22 1829   Dressing Status Clean, dry, & intact 09/10/22 1829   Hub Color/Line Status Yellow 09/10/22 1829       Peripheral IV 09/10/22 Right Other(comment) (Active)   Site Assessment Clean, dry, & intact 09/10/22 1854   Phlebitis Assessment 0 09/10/22 1854   Infiltration Assessment 0 09/10/22 1854   Dressing Status Clean, dry, & intact 09/10/22 1854   Hub Color/Line Status Blue 09/10/22 1854   Action Taken Blood drawn 09/10/22 1854        Opportunity for questions and clarification was provided.       Patient transported with:   [employer]

## 2022-09-11 NOTE — ROUTINE PROCESS
Bedside and Verbal shift change report given to [facility] 46 LPN (oncoming nurse) by Cortez Guillen RN   (offgoing nurse). Report given with SBAR, Kardex, Intake/Output, MAR, Accordion and Recent Results.

## 2022-09-11 NOTE — DIALYSIS
Hemodialysis / [phone]    Vitals Pre Post Assessment Pre Post   BP BP: (!) 106/56 (09/11/22 1130)   147/75 LOC A & O x 4 A & O x 4   HR Pulse (Heart Rate): 96 (09/11/22 1200) 108 Lungs No SOB, room air,clear Room air, no SOB   Resp Resp Rate: 18 (09/11/22 1130) 16 Cardiac Regular rate,rhythm See full cardiac note below    Temp Temp: 98.1 °F (36.7 °C) (09/11/22 1130) 98.2 Skin No uncovered wounds noted No uncovered wounds noted   Weight  Monitor by hospital staff  Edema no no   Tele status Monitor by hospital staff  Pain Pain Intensity 1: 0 (09/11/22 0833) None (see full pain note below)     Orders   Duration: Start: 1245 End: 1515 Total: 2.5   Dialyzer:  revaclear   K Bath:  2   Ca Bath:  2.5   Na:  138     Bicarb:  35   Target Fluid Removal:  2000 ml     Access   Type & Location: LUE AVF, 16 gg needles, no s/s of infection, bruit and thrill noted / disinfect per P & P, cannulate without difficulty   Comments:                                        Labs   HBsAg (Antigen) / date:              8/29/22 negative                                 HBsAb (Antibody) / date: 8/29/22 susceptible   Source: Hospital lab   Obtained/Reviewed  Critical Results Called HGB   Date Value Ref Range Status   09/10/2022 9.6 (L) 12.0 - 16.0 g/dL Final     Potassium   Date Value Ref Range Status   09/11/2022 6.0 (H) 3.5 - 5.5 mmol/L Final     Comment:     CHANGE NOTED/ SAMPLE IS NOT HEMOLYSED     Calcium   Date Value Ref Range Status   09/11/2022 7.9 (L) 8.5 - 10.1 mg/dL Final     Comment:     CHANGE NOTED     BUN   Date Value Ref Range Status   09/11/2022 47 (H) 7 - 18 mg/dL Final     Creatinine   Date Value Ref Range Status   09/11/2022 10.60 (H) 0.60 - 1.30 mg/dL Final     Comment:     CHANGE NOTED        Meds Given   Name Dose Route   none                 Adequacy / Fluid    Total Liters Process: 44   Net Fluid Removed: 2000 ml      Comments   Time Out Done:   (Time) 1240   Admitting Diagnosis: Abdominal discomfort   Consent obtained/signed:  Risks and benefits of dialysis reviewed with patient / verbalized understanding / she is a chronic patient in a clinic   Machine / RO #  [id number]   Primary Nurse Rpt Pre: Sue Mederos LPN   Primary Nurse Rpt PostRoosvelt Jody MCKENZIE   Pt Education: Access care   Care Plan: Continue with hemodialysis as ordered   Pts outpatient clinic: [facility] Summary   Comments:            pain 8/10 abdominal at start of treatment / given zofran and morphine and then pain 0/10. / heart rate  increase to 105 / 110 / 115 intermittently and then back to normal / states history of a-fib,as confirmed by primary nurse / respiratory therapist and primary nurse had follow-up conversation about heart rate,rhythm and still confirmed that patient will be monitored remotely as well, and no concerns at present, due to history  / respiratory therapist present and rounding during treatment / primary nurse rounding during treatment / post treatment, all possible blood returned / hemostasis / avf bruit and thrill noted

## 2022-09-11 NOTE — PROGRESS NOTES
Problem: Risk for Spread of Infection  Goal: Prevent transmission of infectious organism to others  Description: Prevent the transmission of infectious organisms to other patients, staff members, and visitors. Outcome: Progressing Towards Goal     Problem: Patient Education:  Go to Education Activity  Goal: Patient/Family Education  Outcome: Progressing Towards Goal     Problem: Falls - Risk of  Goal: *Absence of Falls  Description: Document Azul Man Fall Risk and appropriate interventions in the flowsheet. Outcome: Progressing Towards Goal  Note: Fall Risk Interventions:  Mobility Interventions: Bed/chair exit alarm, Patient to call before getting OOB         Medication Interventions: Bed/chair exit alarm, Patient to call before getting OOB    Elimination Interventions: Call light in reach              Problem: Patient Education: Go to Patient Education Activity  Goal: Patient/Family Education  Outcome: Progressing Towards Goal     Problem: Pressure Injury - Risk of  Goal: *Prevention of pressure injury  Description: Document Quinn Scale and appropriate interventions in the flowsheet.   Outcome: Progressing Towards Goal  Note: Pressure Injury Interventions:  Sensory Interventions: Assess changes in LOC    Moisture Interventions: Absorbent underpads    Activity Interventions: Increase time out of bed    Mobility Interventions: HOB 30 degrees or less    Nutrition Interventions: Document food/fluid/supplement intake, Offer support with meals,snacks and hydration                     Problem: Patient Education: Go to Patient Education Activity  Goal: Patient/Family Education  Outcome: Progressing Towards Goal     Problem: Chronic Renal Failure  Goal: *Fluid and electrolytes stabilized  Outcome: Progressing Towards Goal     Problem: Patient Education: Go to Patient Education Activity  Goal: Patient/Family Education  Outcome: Progressing Towards Goal

## 2022-09-11 NOTE — PROGRESS NOTES
Comprehensive Nutrition Assessment    Type and Reason for Visit: Initial    Nutrition Recommendations/Plan:   Clear liquids diet advance as tolerate to a renal diet K+ restricted diet     Malnutrition Assessment:  Malnutrition Status: Moderate malnutrition (09/11/22 1143)    Context:  Acute illness     Findings of the 6 clinical characteristics of malnutrition:   Energy Intake:  50% or less of est energy requirements for 5 or more days  Weight Loss:  5% over one month     Body Fat Loss:  No significant body fat loss,     Muscle Mass Loss:  No significant muscle mass loss,    Fluid Accumulation:  No significant fluid accumulation,     Strength:  Not performed     Nutrition Assessment:    73 yo female PMH: diverticulitis, ESRD with transplant, and ESRD with HD, COPD, HTN, hypothyroidism, HLD    Nutrition Related Findings:    Overweight BMI 26.8 at 142 lbs. Pt complains of abdominal pain, N/V/ loose stools, unable to keep pain meds down. abdominal pain has been going on for about a month. ESRD with HD and missed HD on Friday due to symptoms. Hgb  9.6, Hct 30.3 and K+ 3.2. CT suggests possible colitis. Treatment was clear liquids 24-48 hrs and R/O C-diff. today pt still not tolerating PO and refusing meds. Still nausea, but diarrhea has resolved, Still having abdominal pain and K+ is now 6.0 despite poor intake will have HD today for hyperkalemia Wound Type: None    Recent Results (from the past 24 hour(s))   CBC WITH AUTOMATED DIFF    Collection Time: 09/10/22  7:13 PM   Result Value Ref Range    WBC 6.6 4.6 - 13.2 K/uL    RBC 2.95 (L) 4.20 - 5.30 M/uL    HGB 9.6 (L) 12.0 - 16.0 g/dL    HCT 30.3 (L) 35.0 - 45.0 %    .7 (H) 78.0 - 100.0 FL    MCH 32.5 24.0 - 34.0 PG    MCHC 31.7 31.0 - 37.0 g/dL    RDW 18.1 (H) 11.6 - 14.5 %    PLATELET 843 863 - 112 K/uL    MPV 8.8 (L) 9.2 - 11.8 FL    NRBC 0.0 0.0  WBC    ABSOLUTE NRBC 0.00 0.00 - 0.01 K/uL    NEUTROPHILS 55 40 - 73 %    LYMPHOCYTES 25 21 - 52 % MONOCYTES 16 (H) 3 - 10 %    EOSINOPHILS 2 0 - 5 %    BASOPHILS 1 0 - 2 %    IMMATURE GRANULOCYTES 1 (H) 0 - 0.5 %    ABS. NEUTROPHILS 3.7 1.8 - 8.0 K/UL    ABS. LYMPHOCYTES 1.6 0.9 - 3.6 K/UL    ABS. MONOCYTES 1.1 0.05 - 1.2 K/UL    ABS. EOSINOPHILS 0.1 0.0 - 0.4 K/UL    ABS. BASOPHILS 0.0 0.0 - 0.1 K/UL    ABS. IMM. GRANS. 0.0 0.00 - 0.04 K/UL    DF AUTOMATED     METABOLIC PANEL, COMPREHENSIVE    Collection Time: 09/10/22  7:13 PM   Result Value Ref Range    Sodium 142 136 - 145 mmol/L    Potassium 3.2 (L) 3.5 - 5.5 mmol/L    Chloride 111 100 - 111 mmol/L    CO2 14 (L) 21 - 32 mmol/L    Anion gap 17 3.0 - 18.0 mmol/L    Glucose 91 74 - 99 mg/dL    BUN 37 (H) 7 - 18 mg/dL    Creatinine 7.80 (H) 0.60 - 1.30 mg/dL    BUN/Creatinine ratio 5 (L) 12 - 20      GFR est AA 6 (L) >60 ml/min/1.73m2    GFR est non-AA 5 (L) >60 ml/min/1.73m2    Calcium 6.1 (L) 8.5 - 10.1 mg/dL    Bilirubin, total 0.4 0.2 - 1.0 mg/dL    AST (SGOT) 11 10 - 38 U/L    ALT (SGPT) 13 13 - 56 U/L    Alk.  phosphatase 62 45 - 117 U/L    Protein, total 5.0 (L) 6.4 - 8.2 g/dL    Albumin 2.3 (L) 3.4 - 5.0 g/dL    Globulin 2.7 2.0 - 4.0 g/dL    A-G Ratio 0.9 0.8 - 1.7     LIPASE    Collection Time: 09/10/22  7:13 PM   Result Value Ref Range    Lipase 768 (H) 73 - 393 U/L   LACTIC ACID    Collection Time: 09/10/22  7:13 PM   Result Value Ref Range    Lactic acid 0.4 0.4 - 2.0 mmol/L   NT-PRO BNP    Collection Time: 09/10/22  7:13 PM   Result Value Ref Range    NT pro-BNP 8,118 (H) 0 - 048 pg/mL   METABOLIC PANEL, COMPREHENSIVE    Collection Time: 09/11/22  3:23 AM   Result Value Ref Range    Sodium 134 (L) 136 - 145 mmol/L    Potassium 6.0 (H) 3.5 - 5.5 mmol/L    Chloride 103 100 - 111 mmol/L    CO2 16 (L) 21 - 32 mmol/L    Anion gap 15 3.0 - 18.0 mmol/L    Glucose 91 74 - 99 mg/dL    BUN 47 (H) 7 - 18 mg/dL    Creatinine 10.60 (H) 0.60 - 1.30 mg/dL    BUN/Creatinine ratio 4 (L) 12 - 20      GFR est AA 4 (L) >60 ml/min/1.73m2    GFR est non-AA 4 (L) >60 ml/min/1.73m2    Calcium 7.9 (L) 8.5 - 10.1 mg/dL    Bilirubin, total 0.5 0.2 - 1.0 mg/dL    AST (SGOT) 21 10 - 38 U/L    ALT (SGPT) 19 13 - 56 U/L    Alk. phosphatase 76 45 - 117 U/L    Protein, total 6.5 6.4 - 8.2 g/dL    Albumin 2.8 (L) 3.4 - 5.0 g/dL    Globulin 3.7 2.0 - 4.0 g/dL    A-G Ratio 0.8 0.8 - 1.7     LIPASE    Collection Time: 09/11/22  3:23 AM   Result Value Ref Range    Lipase 761 (H) 73 - 393 U/L        Current Nutrition Intake & Therapies:  Average Meal Intake: 1-25%  Average Supplement Intake: None ordered  ADULT DIET Clear Liquid    Anthropometric Measures:  Height: 5' 1\" (154.9 cm)  Ideal Body Weight (IBW): 105 lbs (48 kg)  Admission Body Weight: 142 lb  Current Body Wt:  64.4 kg (142 lb), 135.2 % IBW. Bed scale  Current BMI (kg/m2): 26.8        Weight Adjustment: No adjustment                 BMI Category: Overweight (BMI 25.0-29. 9)    Estimated Daily Nutrient Needs:  Energy Requirements Based On: Kcal/kg (20-25 kcal/kg)  Weight Used for Energy Requirements: Admission (65 kg)  Energy (kcal/day): 7578-2992 kcal/day  Weight Used for Protein Requirements: Admission (1-1.2 g/kg)  Protein (g/day): 65-78 g/day  Method Used for Fluid Requirements: 1 ml/kcal  Fluid (ml/day): 7638-6513 mL/day    Nutrition Diagnosis:   Inadequate protein-energy intake related to altered GI function as evidenced by nausea, vomiting, diarrhea    Nutrition Interventions:   Food and/or Nutrient Delivery: Continue current diet  Nutrition Education/Counseling: Education not appropriate  Coordination of Nutrition Care: Continue to monitor while inpatient       Goals:     Goals: PO intake 75% or greater, by next RD assessment       Nutrition Monitoring and Evaluation:      Food/Nutrient Intake Outcomes: Food and nutrient intake, Diet advancement/tolerance  Physical Signs/Symptoms Outcomes: GI status, Nausea/vomiting, Weight, Biochemical data, Nutrition focused physical findings    Discharge Planning:     Too soon to determine    [address]: ANTHONY [phone]

## 2022-09-12 PROBLEM — K52.9 ACUTE COLITIS: Status: ACTIVE | Noted: 2022-09-12

## 2022-09-12 LAB
ALBUMIN SERPL-MCNC: 2.7 G/DL (ref 3.4–5)
ALBUMIN/GLOB SERPL: 0.8 {RATIO} (ref 0.8–1.7)
ALP SERPL-CCNC: 77 U/L (ref 45–117)
ALT SERPL-CCNC: 15 U/L (ref 13–56)
ANION GAP SERPL CALC-SCNC: 14 MMOL/L (ref 3–18)
AST SERPL W P-5'-P-CCNC: 13 U/L (ref 10–38)
BASOPHILS # BLD: 0.1 K/UL (ref 0–0.1)
BASOPHILS NFR BLD: 1 % (ref 0–2)
BILIRUB SERPL-MCNC: 0.6 MG/DL (ref 0.2–1)
BUN SERPL-MCNC: 20 MG/DL (ref 7–18)
BUN/CREAT SERPL: 3 (ref 12–20)
CA-I BLD-MCNC: 8.3 MG/DL (ref 8.5–10.1)
CHLORIDE SERPL-SCNC: 98 MMOL/L (ref 100–111)
CO2 SERPL-SCNC: 23 MMOL/L (ref 21–32)
CREAT SERPL-MCNC: 7.06 MG/DL (ref 0.6–1.3)
DIFFERENTIAL METHOD BLD: ABNORMAL
EOSINOPHIL # BLD: 0.1 K/UL (ref 0–0.4)
EOSINOPHIL NFR BLD: 1 % (ref 0–5)
ERYTHROCYTE [DISTWIDTH] IN BLOOD BY AUTOMATED COUNT: 18.5 % (ref 11.6–14.5)
GLOBULIN SER CALC-MCNC: 3.3 G/DL (ref 2–4)
GLUCOSE SERPL-MCNC: 87 MG/DL (ref 74–99)
HCT VFR BLD AUTO: 33.3 % (ref 35–45)
HGB BLD-MCNC: 10.4 G/DL (ref 12–16)
IMM GRANULOCYTES # BLD AUTO: 0 K/UL (ref 0–0.04)
IMM GRANULOCYTES NFR BLD AUTO: 0 % (ref 0–0.5)
LYMPHOCYTES # BLD: 1.8 K/UL (ref 0.9–3.6)
LYMPHOCYTES NFR BLD: 28 % (ref 21–52)
MAGNESIUM SERPL-MCNC: 2 MG/DL (ref 1.6–2.6)
MCH RBC QN AUTO: 32.3 PG (ref 24–34)
MCHC RBC AUTO-ENTMCNC: 31.2 G/DL (ref 31–37)
MCV RBC AUTO: 103.4 FL (ref 78–100)
MONOCYTES # BLD: 1.3 K/UL (ref 0.05–1.2)
MONOCYTES NFR BLD: 21 % (ref 3–10)
NEUTS SEG # BLD: 3.1 K/UL (ref 1.8–8)
NEUTS SEG NFR BLD: 49 % (ref 40–73)
NRBC # BLD: 0 K/UL (ref 0–0.01)
NRBC BLD-RTO: 0 PER 100 WBC
PHOSPHATE SERPL-MCNC: 3.6 MG/DL (ref 2.5–4.9)
PLATELET # BLD AUTO: 220 K/UL (ref 135–420)
PMV BLD AUTO: 9.7 FL (ref 9.2–11.8)
POTASSIUM SERPL-SCNC: 4.6 MMOL/L (ref 3.5–5.5)
PROT SERPL-MCNC: 6 G/DL (ref 6.4–8.2)
RBC # BLD AUTO: 3.22 M/UL (ref 4.2–5.3)
SODIUM SERPL-SCNC: 135 MMOL/L (ref 136–145)
WBC # BLD AUTO: 6.4 K/UL (ref 4.6–13.2)

## 2022-09-12 PROCEDURE — 83735 ASSAY OF MAGNESIUM: CPT

## 2022-09-12 PROCEDURE — 97165 OT EVAL LOW COMPLEX 30 MIN: CPT

## 2022-09-12 PROCEDURE — 74011250636 HC RX REV CODE- 250/636: Performed by: INTERNAL MEDICINE

## 2022-09-12 PROCEDURE — 87493 C DIFF AMPLIFIED PROBE: CPT

## 2022-09-12 PROCEDURE — 5A1D70Z PERFORMANCE OF URINARY FILTRATION, INTERMITTENT, LESS THAN 6 HOURS PER DAY: ICD-10-PCS | Performed by: INTERNAL MEDICINE

## 2022-09-12 PROCEDURE — 74011000250 HC RX REV CODE- 250: Performed by: NURSE PRACTITIONER

## 2022-09-12 PROCEDURE — 94761 N-INVAS EAR/PLS OXIMETRY MLT: CPT

## 2022-09-12 PROCEDURE — 84100 ASSAY OF PHOSPHORUS: CPT

## 2022-09-12 PROCEDURE — 36415 COLL VENOUS BLD VENIPUNCTURE: CPT

## 2022-09-12 PROCEDURE — 87324 CLOSTRIDIUM AG IA: CPT

## 2022-09-12 PROCEDURE — 80053 COMPREHEN METABOLIC PANEL: CPT

## 2022-09-12 PROCEDURE — 90935 HEMODIALYSIS ONE EVALUATION: CPT

## 2022-09-12 PROCEDURE — 74011000250 HC RX REV CODE- 250: Performed by: STUDENT IN AN ORGANIZED HEALTH CARE EDUCATION/TRAINING PROGRAM

## 2022-09-12 PROCEDURE — 96376 TX/PRO/DX INJ SAME DRUG ADON: CPT

## 2022-09-12 PROCEDURE — 97161 PT EVAL LOW COMPLEX 20 MIN: CPT

## 2022-09-12 PROCEDURE — 85025 COMPLETE CBC W/AUTO DIFF WBC: CPT

## 2022-09-12 PROCEDURE — 74011250637 HC RX REV CODE- 250/637: Performed by: NURSE PRACTITIONER

## 2022-09-12 PROCEDURE — G0378 HOSPITAL OBSERVATION PER HR: HCPCS

## 2022-09-12 PROCEDURE — 65270000029 HC RM PRIVATE

## 2022-09-12 PROCEDURE — 94640 AIRWAY INHALATION TREATMENT: CPT

## 2022-09-12 PROCEDURE — 74011250636 HC RX REV CODE- 250/636: Performed by: NURSE PRACTITIONER

## 2022-09-12 RX ORDER — SODIUM CHLORIDE 9 MG/ML
2000 INJECTION, SOLUTION INTRAVENOUS ONCE
Status: COMPLETED | OUTPATIENT
Start: 2022-09-12 | End: 2022-09-13

## 2022-09-12 RX ORDER — SODIUM CHLORIDE 9 MG/ML
25 INJECTION, SOLUTION INTRAVENOUS
Status: DISCONTINUED | OUTPATIENT
Start: 2022-09-12 | End: 2022-09-14 | Stop reason: HOSPADM

## 2022-09-12 RX ORDER — IPRATROPIUM BROMIDE AND ALBUTEROL SULFATE 2.5; .5 MG/3ML; MG/3ML
3 SOLUTION RESPIRATORY (INHALATION)
Status: DISCONTINUED | OUTPATIENT
Start: 2022-09-13 | End: 2022-09-14 | Stop reason: HOSPADM

## 2022-09-12 RX ADMIN — SEVELAMER CARBONATE 800 MG: 800 TABLET, FILM COATED ORAL at 21:35

## 2022-09-12 RX ADMIN — SODIUM CHLORIDE 2000 ML: 9 INJECTION, SOLUTION INTRAVENOUS at 16:14

## 2022-09-12 RX ADMIN — ATORVASTATIN CALCIUM 10 MG: 10 TABLET, FILM COATED ORAL at 21:35

## 2022-09-12 RX ADMIN — MORPHINE SULFATE 2 MG: 2 INJECTION, SOLUTION INTRAMUSCULAR; INTRAVENOUS at 03:40

## 2022-09-12 RX ADMIN — ONDANSETRON 4 MG: 2 INJECTION INTRAMUSCULAR; INTRAVENOUS at 03:40

## 2022-09-12 RX ADMIN — PANTOPRAZOLE SODIUM 40 MG: 40 TABLET, DELAYED RELEASE ORAL at 09:13

## 2022-09-12 RX ADMIN — ONDANSETRON 4 MG: 2 INJECTION INTRAMUSCULAR; INTRAVENOUS at 10:17

## 2022-09-12 RX ADMIN — APIXABAN 2.5 MG: 2.5 TABLET, FILM COATED ORAL at 19:07

## 2022-09-12 RX ADMIN — BUDESONIDE 250 MCG: 0.25 SUSPENSION RESPIRATORY (INHALATION) at 08:08

## 2022-09-12 RX ADMIN — SODIUM CHLORIDE, PRESERVATIVE FREE 10 ML: 5 INJECTION INTRAVENOUS at 13:27

## 2022-09-12 RX ADMIN — IPRATROPIUM BROMIDE AND ALBUTEROL SULFATE 3 ML: .5; 3 SOLUTION RESPIRATORY (INHALATION) at 21:16

## 2022-09-12 RX ADMIN — ONDANSETRON 4 MG: 2 INJECTION INTRAMUSCULAR; INTRAVENOUS at 19:28

## 2022-09-12 RX ADMIN — APIXABAN 2.5 MG: 2.5 TABLET, FILM COATED ORAL at 09:12

## 2022-09-12 RX ADMIN — CINACALCET HYDROCHLORIDE 30 MG: 30 TABLET, FILM COATED ORAL at 09:13

## 2022-09-12 RX ADMIN — CLOPIDOGREL BISULFATE 75 MG: 75 TABLET ORAL at 09:12

## 2022-09-12 RX ADMIN — MONTELUKAST 10 MG: 10 TABLET, FILM COATED ORAL at 09:12

## 2022-09-12 RX ADMIN — CARVEDILOL 25 MG: 12.5 TABLET, FILM COATED ORAL at 09:12

## 2022-09-12 RX ADMIN — IPRATROPIUM BROMIDE AND ALBUTEROL SULFATE 3 ML: .5; 3 SOLUTION RESPIRATORY (INHALATION) at 08:09

## 2022-09-12 RX ADMIN — MORPHINE SULFATE 2 MG: 2 INJECTION, SOLUTION INTRAMUSCULAR; INTRAVENOUS at 10:17

## 2022-09-12 RX ADMIN — SODIUM CHLORIDE, PRESERVATIVE FREE 10 ML: 5 INJECTION INTRAVENOUS at 22:09

## 2022-09-12 RX ADMIN — LEVOTHYROXINE SODIUM 75 MCG: 0.07 TABLET ORAL at 09:13

## 2022-09-12 RX ADMIN — SEVELAMER CARBONATE 800 MG: 800 TABLET, FILM COATED ORAL at 09:12

## 2022-09-12 RX ADMIN — IPRATROPIUM BROMIDE AND ALBUTEROL SULFATE 3 ML: .5; 3 SOLUTION RESPIRATORY (INHALATION) at 14:42

## 2022-09-12 RX ADMIN — DICYCLOMINE HYDROCHLORIDE 10 MG: 10 CAPSULE ORAL at 09:12

## 2022-09-12 RX ADMIN — BUDESONIDE 250 MCG: 0.25 SUSPENSION RESPIRATORY (INHALATION) at 21:17

## 2022-09-12 RX ADMIN — LOSARTAN POTASSIUM 50 MG: 25 TABLET, FILM COATED ORAL at 09:12

## 2022-09-12 RX ADMIN — SODIUM CHLORIDE, PRESERVATIVE FREE 10 ML: 5 INJECTION INTRAVENOUS at 05:41

## 2022-09-12 RX ADMIN — AMLODIPINE BESYLATE 10 MG: 5 TABLET ORAL at 09:12

## 2022-09-12 RX ADMIN — AMIODARONE HYDROCHLORIDE 200 MG: 200 TABLET ORAL at 09:12

## 2022-09-12 RX ADMIN — MORPHINE SULFATE 2 MG: 2 INJECTION, SOLUTION INTRAMUSCULAR; INTRAVENOUS at 15:37

## 2022-09-12 RX ADMIN — IPRATROPIUM BROMIDE AND ALBUTEROL SULFATE 3 ML: .5; 3 SOLUTION RESPIRATORY (INHALATION) at 02:20

## 2022-09-12 NOTE — DIALYSIS
Hemodialysis / [phone]    Vitals Pre Post Assessment Pre Post   BP BP: (!) 147/69 (09/12/22 0728)   127/72 LOC A & O x 4 A & O x 4   HR Pulse (Heart Rate): 78 (09/12/22 1151) 83 Lungs No SOB, clear,room air Room air,no SOB   Resp Resp Rate: 16 (09/12/22 0728) 16 Cardiac Regular rate,rhythm varies (history of a-fib) Regular rate,rhythm varies   Temp Temp: 98.1 °F (36.7 °C) (09/12/22 0728) 98.5 Skin No uncovered wounds noted No uncovered wounds noted   Weight    Edema none none   Tele status   Pain Pain Intensity 1: 0 (09/12/22 0858) none     Orders   Duration: Start: 1530 End: 1800 Total: 2.5   Dialyzer: Dialyzer/Set Up Inspection: Revaclear (09/11/22 1245)   K Bath: Dialysate K (mEq/L): 2 (09/11/22 1245)   Ca Bath: Dialysate CA (mEq/L): 2.5 (09/11/22 1245)   Na: Dialysate NA (mEq/L): 138 (09/11/22 1245)   Bicarb: Dialysate HCO3 (mEq/L): 35 (09/11/22 1245)   Target Fluid Removal: Goal/Amount of Fluid to Remove (mL): 2000 mL (09/11/22 1245)     Access   Type & Location: LUE AVF, no s/s of infection, bruit and thrill noted / disinfect per P and P, cannulate 16 gg needles without difficulty   Comments:                                        Labs   HBsAg (Antigen) / date:            8/29/22 negative                                   HBsAb (Antibody) / date: Susceptible 8/29/22   Source: Hospital labs   Obtained/Reviewed  Critical Results Called HGB   Date Value Ref Range Status   09/12/2022 10.4 (L) 12.0 - 16.0 g/dL Final     Comment:     CHANGE NOTED     Potassium   Date Value Ref Range Status   09/12/2022 4.6 3.5 - 5.5 mmol/L Final     Calcium   Date Value Ref Range Status   09/12/2022 8.3 (L) 8.5 - 10.1 mg/dL Final     BUN   Date Value Ref Range Status   09/12/2022 20 (H) 7 - 18 mg/dL Final     Creatinine   Date Value Ref Range Status   09/12/2022 7.06 (H) 0.60 - 1.30 mg/dL Final        Meds Given   Name Dose Route   none                 Adequacy / Fluid    Total Liters Process: 45   Net Fluid Removed: 1200 ml Comments   Time Out Done:   (Time) 1525   Admitting Diagnosis: Abdominal pain   Consent obtained/signed: Informed Consent Verified: Yes (09/11/22 8547)   Machine / RO # Machine Number: [id number] (09/11/22 4175)   Primary Nurse Rpt Pre: Samantha Sheets LPN   Primary Nurse Rpt Post: MAUREEN Manzanares RN   Pt Education: Access care   Care Plan: Continue with hemodialysis as scheduled   Pts outpatient clinic: [facility]     Tx Summary   Comments:          no pain after morphine given (start of treatment) for abdominal discomfort.       Rested much of treatment / post treatment, all possible blood returned / hemostasis / avf bruit and thrill noted

## 2022-09-12 NOTE — ROUTINE PROCESS
Bedside and Verbal shift change report given to Julieta Norton (oncoming nurse) by Hanna Ashley RN   (offgoing nurse). Report given with SBAR, Kardex, Intake/Output, MAR, Accordion and Recent Results.

## 2022-09-12 NOTE — PROGRESS NOTES
Patient admitted with abdominal pain and nausea. Had dialysis yesterday for elevated potassium Normally runs mwf but since ran yesterday will place orders for tomorrow to run again .   Treatment of colitis left to medicine

## 2022-09-12 NOTE — ACP (ADVANCE CARE PLANNING)
Advance Care Planning   Advance Care Planning Inpatient Note  [address] Department    Today's Date: 9/12/2022  Unit: [address]    Received request from rounding. Upon review of chart and communication with care team, patient's decision making abilities are not in question. Patient was/were present in the room during visit. Goals of ACP Conversation:  Discuss Advance Care planning documents    Health Care Decision Makers:      Primary Decision Maker: Jey Tavarez - Daughter - [phone]    Summary:  [address]  Patient admitted her daughter Jey Tavarez is is healthcare decision maker. Encourage to complete advance medical directive to put on file.    Advance Care Planning Documents (Patient Wishes) on file:  None     Assessment:              Interventions:  Provided education on documents for clarity and greater understanding  Encouraged ongoing ACP conversation with future decision makers and loved ones    Care Preferences Communicated:  No    Outcomes/Plan:  ACP Discussion Refused    Chaplain Jessy on 9/12/2022 at 1:17 PM

## 2022-09-12 NOTE — PROGRESS NOTES
Admission Medication Reconciliation:    Information obtained from:  discharge summary from 8/2/22 Admission    Comments/Recommendations: Reviewed PTA medications and patient's allergies. Allergies:  Aspirin, Bactrim [sulfamethoxazole-trimethoprim], Bromfenac, Cefepime, Ceftriaxone, Copper, Ibuprofen, Ketorolac tromethamine, Relafen [nabumetone], Rifampin, and Vancomycin    Significant PMH/Disease States:   Past Medical History:   Diagnosis Date    JEFF (acute kidney injury) ([facility] [address] 05/09/2019    Anemia NEC     Anxiety     Arrhythmia     Asthma     Asthma     Burning with urination     frequent uti    Cholecystitis 01/12/2019    Chronic kidney disease     dialysis    CMV (cytomegalovirus) antibody positive     COPD (chronic obstructive pulmonary disease) ([facility]     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    Dialysis patient [facility])     M/W/F    Diverticular disease of colon 08/21/2013    Dyspepsia and other specified disorders of function of stomach     stomach ulcer    Elevated troponin 10/21/2019    Essential hypertension     GERD (gastroesophageal reflux disease)     History of chemotherapy     History of renal transplant 08/21/2013    (LD 2/12/2002)    HLD (hyperlipidemia)     Hypercholesteremia     Hypertension     Hypothyroidism 03/23/2022    Kidney transplant rejection     Menopause     Migraine     Obesity     Pyelonephritis 07/13/2020    Renal cyst 2002    kidney transplant     Spontaneous bacterial peritonitis ([facility] [address] 01/16/2019    Ulcer      Chief Complaint for this Admission:    Chief Complaint   Patient presents with    Vomiting     Prior to Admission Medications:   Prior to Admission Medications   Prescriptions Last Dose Informant Patient Reported? Taking?    ESTRACE 0.01 % (0.1 mg/gram) vaginal cream  Self No No   Sig: INSERT 2 GRAMS INTO VAGINA EVERY MONDAY AND THURSDAY   HYDROcodone-acetaminophen (Norco) 5-325 mg per tablet   No No   Sig: Take 1 Tablet by mouth every six (6) hours as needed for Pain for up to 3 days. Max Daily Amount: 4 Tablets. RenaPlex-D 800 mcg-12.5 mg -2,000 unit tab  Self Yes No   Sig: Take 1 Tablet by mouth daily. albuterol (PROVENTIL VENTOLIN) 2.5 mg /3 mL (0.083 %) nebu  Self No No   Sig: USE 1 VIAL VIA NEBULIZER THREE TIMES DAILY   aluminum & magnesium hydroxide-simethicone (Maalox Maximum Strength) 400-400-40 mg/5 mL suspension  Self No No   Sig: Take 10 mL by mouth every six (6) hours as needed for Indigestion. amLODIPine (NORVASC) 10 mg tablet  Self No No   Sig: Take 1 tablet by mouth once daily   amiodarone (CORDARONE) 200 mg tablet  Self No No   Sig: TAKE 1 TABLET BY MOUTH EVERY DAY   amoxicillin-clavulanate (Augmentin) 875-125 mg per tablet   No No   Sig: Take 1 Tablet by mouth two (2) times a day. Patient taking differently: Take 1 Tablet by mouth two (2) times a day. Start 9/7/22 End 9/17/22   apixaban (ELIQUIS) 2.5 mg tablet  Self No No   Sig: Take 1 Tablet by mouth two (2) times a day. calcitRIOL (ROCALTROL) 0.5 mcg capsule  Self Yes No   Sig: Take 0.5 mcg by mouth See Admin Instructions. Take on non dialysis days (Tues, Wed, Thur, Sat, Sun)  Dialysis is on Monday and Friday   carvediloL (COREG) 25 mg tablet  Self No No   Sig: Take 1 Tablet by mouth two (2) times daily (with meals). cinacalcet (SENSIPAR) 30 mg tablet  Self Yes No   Sig: Take 30 mg by mouth daily. clopidogreL (PLAVIX) 75 mg tab  Self Yes No   Sig: Take 75 mg by mouth daily. cranberry extract 425 mg cap  Self Yes No   Sig: Take 425 mg by mouth daily. cyclobenzaprine (FLEXERIL) 5 mg tablet  Self No No   Sig: Take 1 Tablet by mouth three (3) times daily as needed for Muscle Spasm(s). dexlansoprazole (Dexilant) 60 mg CpDB capsule (delayed release)  Self No No   Sig: Take 1 Capsule by mouth in the morning. dicyclomine (BENTYL) 10 mg capsule  Self No No   Sig: Take 1 Capsule by mouth in the morning.    fluticasone propionate (FLONASE) 50 mcg/actuation nasal spray  Self No No   Sig: Take 1 Spray by Both Nostrils route in the morning. fluticasone-umeclidinium-vilanterol (Trelegy Ellipta) 100-62.5-25 mcg inhaler  Self No No   Sig: Take 1 Puff by inhalation in the morning. hyoscyamine SL (LEVSIN/SL) 0.125 mg SL tablet  Self No No   Si Tablet by SubLINGual route every four (4) hours as needed (irritable bowel). levothyroxine (SYNTHROID) 75 mcg tablet  Self No No   Sig: Take 1 Tablet by mouth Daily (before breakfast). lidocaine-prilocaine (EMLA) topical cream  Self Yes No   Sig: APPLY SMALL AMOUNT TO ACCESS SITE (AVF) 1 TO 2 HOURS BEFORE DIALYSIS. COVER WITH OCCLUSIVE DRESSING (SARAN WRAP)   losartan (COZAAR) 50 mg tablet  Self No No   Sig: Take 1 Tablet by mouth daily. meclizine (ANTIVERT) 25 mg tablet  Self No No   Sig: Take 1 Tablet by mouth three (3) times daily as needed for Dizziness. metroNIDAZOLE (FlagyL) 500 mg tablet   No No   Sig: Take 1 Tablet by mouth three (3) times daily for 10 days. Patient taking differently: Take 500 mg by mouth three (3) times daily. Start: 22  End: 22   montelukast (SINGULAIR) 10 mg tablet  Self No No   Sig: Take 1 Tablet by mouth in the morning. ondansetron (ZOFRAN ODT) 4 mg disintegrating tablet  Self No No   Sig: Take 1 Tablet by mouth every eight (8) hours as needed for Nausea or Nausea or Vomiting. ondansetron (ZOFRAN ODT) 4 mg disintegrating tablet   No Yes   Sig: Take 2 Tablets by mouth every eight (8) hours as needed for Nausea or Nausea or Vomiting. polyethylene glycol (Miralax) 17 gram packet   No No   Sig: Take 1 Packet by mouth daily. predniSONE (DELTASONE) 5 mg tablet  Self Yes No   Sig: Take 1 Tablet by mouth daily. sevelamer carbonate (RENVELA) 800 mg tab tab  Self Yes No   Sig: Take 800 mg by mouth three (3) times daily.    simvastatin (ZOCOR) 20 mg tablet  Self No No   Sig: TAKE 1 TABLET BY MOUTH DAILY AS DIRECTED.   sucralfate (CARAFATE) 1 gram tablet  Self No No   Sig: TAKE 1 TABLET BY MOUTH FOUR TIMES DAILY   valGANciclovir (VALCYTE) 450 mg tablet  Self No No   Sig: Take 2 Tablets by mouth in the morning. zolpidem (AMBIEN) 5 mg tablet  Self No No   Sig: Take 1 Tablet by mouth nightly as needed for Sleep. Max Daily Amount: 5 mg.       Facility-Administered Medications: None       KAREN Pulido

## 2022-09-12 NOTE — PROGRESS NOTES
Problem: Mobility Impaired (Adult and Pediatric)  Goal: *Acute Goals and Plan of Care (Insert Text)  Description: Physical Therapy Goals  Initiated 9/12/2022 and to be accomplished within 7 day(s)  1. Patient will move from supine to sit and sit to supine , scoot up and down, and roll side to side in bed with modified independence. 2.  Patient will transfer from bed to chair and chair to bed with modified independence using the least restrictive device. 3.  Patient will perform sit to stand with modified independence. 4.  Patient will ambulate with modified independence for 100 feet with the least restrictive device. 5.  Patient will ascend/descend 4 stairs with 2 handrail(s) with minimal assistance/contact guard assist.    PLOF: Community ambulator, no AD, (I) ADLs. Outcome: Progressing Towards Goal   PHYSICAL THERAPY EVALUATION    Patient: Romie Carrera (28 y.o. female)  Date: 9/12/2022   Start Time: 0952   Stop Time: 1007  $$ Initial PT Evaluation: Low Complex 20 Min                Primary Diagnosis: Colitis [K52.9]  Intractable nausea and vomiting [R11.2]  Weakness [R53.1]  ESRD (end stage renal disease) on dialysis ([facility] [address] [N18.6, Z99.2]  Acute colitis [K52.9]       Precautions:   Contact (C-diff rule out)    ASSESSMENT :  Based on the objective data described below, the patient presents with colitis with c/o nausea. She performs mobility well but c/o feeling a little lightheaded. She did not want to use a RW or cane but used the therapist's hand to walk. She returns to bed and is left with all needs met. Patient would benefit from further P.T. to improve strength, gait, and stair navigation. They would likely benefit from return home once medically stable. Patient will benefit from skilled intervention to address the above impairments.   Patient's rehabilitation potential is considered to be Excellent  Factors which may influence rehabilitation potential include:   []         None noted  [] Mental ability/status  [x]         Medical condition  []         Home/family situation and support systems  []         Safety awareness  []         Pain tolerance/management  []         Other:      PLAN :  Recommendations and Planned Interventions:   [x]           Bed Mobility Training             [x]    Neuromuscular Re-Education  [x]           Transfer Training                   []    Orthotic/Prosthetic Training  [x]           Gait Training                          []    Modalities  [x]           Therapeutic Exercises           []    Edema Management/Control  [x]           Therapeutic Activities            [x]    Family Training/Education  [x]           Patient Education  []           Other (comment):    Frequency/Duration: Patient will be followed by physical therapy 1-2 times per day/4-7 days per week to address goals. Discharge Recommendations: Home as prior  Further Equipment Recommendations for Discharge: straight cane     SUBJECTIVE:   Patient stated Elizabeth Flaming me get this walk done real quick.     OBJECTIVE DATA SUMMARY:     Past Medical History:   Diagnosis Date    JEFF (acute kidney injury) ([facility] 05/09/2019    Anemia NEC     Anxiety     Arrhythmia     Asthma     Asthma     Burning with urination     frequent uti    Cholecystitis 01/12/2019    Chronic kidney disease     dialysis    CMV (cytomegalovirus) antibody positive     COPD (chronic obstructive pulmonary disease) ([facility]     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    Dialysis patient [facility])     M/W/F    Diverticular disease of colon 08/21/2013    Dyspepsia and other specified disorders of function of stomach     stomach ulcer    Elevated troponin 10/21/2019    Essential hypertension     GERD (gastroesophageal reflux disease)     History of chemotherapy     History of renal transplant 08/21/2013    (LD 2/12/2002)    HLD (hyperlipidemia)     Hypercholesteremia     Hypertension     Hypothyroidism 03/23/2022    Kidney transplant rejection     Menopause     Migraine Obesity     Pyelonephritis 07/13/2020    Renal cyst 2002    kidney transplant     Spontaneous bacterial peritonitis ([facility] [address] 01/16/2019    Ulcer      Past Surgical History:   Procedure Laterality Date    COLONOSCOPY      Dr Felicia Keith  5yrs ago    ENDOSCOPY VISIT-OUTPATIENT      Dr Felicia Keith  5 yrs ago    ENDOSCOPY, COLON, DIAGNOSTIC      with Dr. Piper Flavin CHOLECYSTECTOMY  02/2021    HX COLONOSCOPY  05/13/2022    HX GI      ulcer    HX HEENT      sinus surg    HX OTHER SURGICAL  02/21/2022    SIGMOIDOSCOPY, FLEXIBLE;  Surgeon: Lorie Runner, MD    HX RENAL TRANSPLANT      HX TRANSPLANT      kidney transplant 2002    VASCULAR SURGERY PROCEDURE UNLIST      shunt in prep for dialysis     Barriers to Learning/Limitations: None  Compensate with: N/A  Home Situation:  Home Situation  Home Environment: Private residence  # Steps to Enter: 4  Rails to Enter: Yes  Hand Rails : Bilateral  One/Two Story Residence: One story  Living Alone: No  Support Systems: Other Family Member(s), Child(lilibeth)  Patient Expects to be Discharged to[de-identified] Home  Current DME Used/Available at Home: None  Critical Behavior:  Neurologic State: Alert  Orientation Level: Oriented X4    Strength:    Strength: Generally decreased, functional    RUE: 4+/5  LUE: 4+/5  RLE: 4/5  LLE: 4/5  Tone & Sensation:   Tone: Normal    Sensation: Intact    Range Of Motion:   AROM: Within functional limits    Posture:  Posture (WDL): Within defined limits     Functional Mobility:  Bed Mobility:  Rolling: Modified independent  Supine to Sit: Modified independent  Sit to Supine: Modified independent  Scooting: Modified independent  Transfers:  Sit to Stand: Contact guard assistance  Stand to Sit: Contact guard assistance    Balance:   Sitting: Intact  Standing: Impaired  Standing - Static: Fair  Standing - Dynamic : Fair  Ambulation/Gait Training:  Distance (ft): 30 Feet (ft)  Assistive Device: Other (comment) (HHA)  Ambulation - Level of Assistance: Contact guard assistance     Gait Description (WDL): Exceptions to WDL  Gait Abnormalities: Path deviations     AM-PAC:  20/24; Current research shows that an AM-PAC score of 17 or less is typically not associated with a discharge to the patient's home setting, whereas a score of 18 or greater is typically associated with a discharge to the patient's home setting. Pain:  Pain level pre-treatment: 8/10   Pain level post-treatment: 8/10   Pain Location: Abdomen  Pain Intervention(s) : Medication (see MAR); Rest, Ice, Repositioning  Response to intervention: Nurse notified, See doc flow    Activity Tolerance:   Poor  Please refer to the flowsheet for vital signs taken during this treatment. After treatment:   []         Patient left in no apparent distress sitting up in chair  [x]         Patient left in no apparent distress in bed  [x]         Call bell left within reach  [x]         Nursing notified  []         Caregiver present  []         Bed alarm activated  []         SCDs applied    COMMUNICATION/EDUCATION:   [x]         Role of Physical Therapy in the acute care setting. [x]         Fall prevention education was provided and the patient/caregiver indicated understanding. [x]         Patient/family have participated as able in goal setting and plan of care. [x]         Patient/family agree to work toward stated goals and plan of care. []         Patient understands intent and goals of therapy, but is neutral about his/her participation. []         Patient is unable to participate in goal setting/plan of care: ongoing with therapy staff.  []         Other:     Thank you for this referral.  Ludwig Mera, PT, DPT   Time Calculation: 15 mins

## 2022-09-12 NOTE — PROGRESS NOTES
HOSPITALIST PROGRESS NOTE  Mahesh Maldonado MD, [address]         Daily Progress Note: 9/12/2022      Subjective:     Patient is alert and oriented x4. Continues to have significant nausea along with continued and persistent diarrhea. Tolerate minimally in terms of oral diet. Recently has been evaluated by GI status post colonoscopy on outpatient basis with resultant diagnosis of irritable bowel syndrome and diarrhea component. C. difficile was ordered over the weekend however had not been collected as of yet. Also history of recent treatment with antibiotics including ciprofloxacin and Flagyl for acute diverticulitis just 1 month prior. Will obtain GI consultation as well and discussed with patient extensively. No overnight fever/chills, chest pain, abdominal pain noted.       Medications reviewed  Current Facility-Administered Medications   Medication Dose Route Frequency    0.9% sodium chloride infusion  25 mL/hr IntraVENous DIALYSIS PRN    0.9% sodium chloride infusion  50 mL/hr IntraVENous CONTINUOUS    ondansetron (ZOFRAN) injection 4 mg  4 mg IntraVENous Q4H PRN    morphine injection 2 mg  2 mg IntraVENous Q4H PRN    sodium chloride (NS) flush 5-40 mL  5-40 mL IntraVENous Q8H    sodium chloride (NS) flush 5-40 mL  5-40 mL IntraVENous PRN    acetaminophen (TYLENOL) tablet 650 mg  650 mg Oral Q6H PRN    Or    acetaminophen (TYLENOL) suppository 650 mg  650 mg Rectal Q6H PRN    polyethylene glycol (MIRALAX) packet 17 g  17 g Oral DAILY PRN    amiodarone (CORDARONE) tablet 200 mg  200 mg Oral DAILY    amLODIPine (NORVASC) tablet 10 mg  10 mg Oral DAILY    apixaban (ELIQUIS) tablet 2.5 mg  2.5 mg Oral BID    carvediloL (COREG) tablet 25 mg  25 mg Oral BID WITH MEALS    cinacalcet (SENSIPAR) tablet 30 mg  30 mg Oral DAILY    clopidogreL (PLAVIX) tablet 75 mg  75 mg Oral DAILY    cyclobenzaprine (FLEXERIL) tablet 5 mg  5 mg Oral TID PRN    pantoprazole (PROTONIX) tablet 40 mg  40 mg Oral ACB    dicyclomine (BENTYL) capsule 10 mg  10 mg Oral DAILY    fluticasone propionate (FLONASE) 50 mcg/actuation nasal spray 1 Spray  1 Spray Both Nostrils DAILY    levothyroxine (SYNTHROID) tablet 75 mcg  75 mcg Oral ACB    losartan (COZAAR) tablet 50 mg  50 mg Oral DAILY    montelukast (SINGULAIR) tablet 10 mg  10 mg Oral DAILY    sevelamer carbonate (RENVELA) tab 800 mg  800 mg Oral TID    atorvastatin (LIPITOR) tablet 10 mg  10 mg Oral QHS    valGANciclovir (VALCYTE) tablet 900 mg - patient supplied (Patient Supplied)  900 mg Oral DAILY    zolpidem (AMBIEN) tablet 5 mg  5 mg Oral QHS PRN    albuterol-ipratropium (DUO-NEB) 2.5 MG-0.5 MG/3 ML  3 mL Nebulization Q6H RT    albuterol (PROVENTIL VENTOLIN) nebulizer solution 2.5 mg  2.5 mg Nebulization Q4H PRN    budesonide (PULMICORT) 250 mcg/2ml nebulizer susp  250 mcg Nebulization BID RT       Review of Systems:   A comprehensive review of systems was negative except for that written in the HPI. Objective:   Physical Exam:     Visit Vitals  BP (!) 147/69   Pulse 78   Temp 98.1 °F (36.7 °C)   Resp 16   Ht 5' 1\" (1.549 m)   Wt 64.4 kg (142 lb)   SpO2 94%   BMI 26.83 kg/m²      O2 Device: None (Room air)  Patient Vitals for the past 8 hrs:   Temp Pulse Resp BP SpO2   22 1151 -- 78 -- -- --   22 0814 -- -- -- -- 94 %   22 0810 -- -- -- -- 94 %   22 0800 -- 83 -- -- --   22 0728 98.1 °F (36.7 °C) 83 16 (!) 147/69 --          Temp (24hrs), Av.2 °F (37.3 °C), Min:98.1 °F (36.7 °C), Max:100.3 °F (37.9 °C)    No intake/output data recorded. 09/10 1901 -  0700  In: 888.3 [I.V.:888.3]  Out: 2000     General:  Alert, cooperative, no distress, appears stated age. Lungs:   Clear to auscultation bilaterally. Chest wall:  No tenderness or deformity. Heart:  Regular rate and rhythm, S1, S2 normal, no murmur, click, rub or gallop.    Abdomen:   Soft, mild distention with tenderness to palpation diffusely noted. Bowel sounds normal. No masses,  No organomegaly. Extremities: Extremities normal, atraumatic, no cyanosis or edema. Pulses: 2+ and symmetric all extremities. Skin: Skin color, texture, turgor normal. No rashes or lesions   Neurologic: No gross sensory or motor deficits     Data Review:       Recent Days:  Recent Labs     09/12/22  0435 09/11/22  1710 09/10/22  1913   WBC 6.4 7.6 6.6   HGB 10.4* 11.8* 9.6*   HCT 33.3* 37.4 30.3*    207 167     Recent Labs     09/12/22  0435 09/11/22  1710 09/11/22  0323   * 136 134*   K 4.6 4.4 6.0*   CL 98* 98* 103   CO2 23 26 16*   GLU 87 70* 91   BUN 20* 20* 47*   CREA 7.06* 6.33* 10.60*   CA 8.3* 8.4* 7.9*   MG 2.0  --   --    PHOS 3.6  --   --    ALB 2.7* 2.9* 2.8*   TBILI 0.6 0.6 0.5   ALT 15 20 19     No results for input(s): PH, PCO2, PO2, HCO3, FIO2 in the last 72 hours. 24 Hour Results:  Recent Results (from the past 24 hour(s))   CBC WITH AUTOMATED DIFF    Collection Time: 09/11/22  5:10 PM   Result Value Ref Range    WBC 7.6 4.6 - 13.2 K/uL    RBC 3.63 (L) 4.20 - 5.30 M/uL    HGB 11.8 (L) 12.0 - 16.0 g/dL    HCT 37.4 35.0 - 45.0 %    .0 (H) 78.0 - 100.0 FL    MCH 32.5 24.0 - 34.0 PG    MCHC 31.6 31.0 - 37.0 g/dL    RDW 18.4 (H) 11.6 - 14.5 %    PLATELET 060 117 - 402 K/uL    MPV 9.2 9.2 - 11.8 FL    NRBC 0.0 0.0  WBC    ABSOLUTE NRBC 0.00 0.00 - 0.01 K/uL    NEUTROPHILS 59 40 - 73 %    LYMPHOCYTES 19 (L) 21 - 52 %    MONOCYTES 19 (H) 3 - 10 %    EOSINOPHILS 1 0 - 5 %    BASOPHILS 1 0 - 2 %    IMMATURE GRANULOCYTES 1 (H) 0 - 0.5 %    ABS. NEUTROPHILS 4.5 1.8 - 8.0 K/UL    ABS. LYMPHOCYTES 1.5 0.9 - 3.6 K/UL    ABS. MONOCYTES 1.5 (H) 0.05 - 1.2 K/UL    ABS. EOSINOPHILS 0.1 0.0 - 0.4 K/UL    ABS. BASOPHILS 0.0 0.0 - 0.1 K/UL    ABS. IMM.  GRANS. 0.1 (H) 0.00 - 0.04 K/UL    DF AUTOMATED     METABOLIC PANEL, COMPREHENSIVE    Collection Time: 09/11/22  5:10 PM   Result Value Ref Range    Sodium 136 136 - 145 mmol/L    Potassium 4.4 3.5 - 5.5 mmol/L    Chloride 98 (L) 100 - 111 mmol/L    CO2 26 21 - 32 mmol/L    Anion gap 12 3.0 - 18.0 mmol/L    Glucose 70 (L) 74 - 99 mg/dL    BUN 20 (H) 7 - 18 mg/dL    Creatinine 6.33 (H) 0.60 - 1.30 mg/dL    BUN/Creatinine ratio 3 (L) 12 - 20      GFR est AA 8 (L) >60 ml/min/1.73m2    GFR est non-AA 7 (L) >60 ml/min/1.73m2    Calcium 8.4 (L) 8.5 - 10.1 mg/dL    Bilirubin, total 0.6 0.2 - 1.0 mg/dL    AST (SGOT) 24 10 - 38 U/L    ALT (SGPT) 20 13 - 56 U/L    Alk. phosphatase 92 45 - 117 U/L    Protein, total 6.8 6.4 - 8.2 g/dL    Albumin 2.9 (L) 3.4 - 5.0 g/dL    Globulin 3.9 2.0 - 4.0 g/dL    A-G Ratio 0.7 (L) 0.8 - 1.7     CBC WITH AUTOMATED DIFF    Collection Time: 09/12/22  4:35 AM   Result Value Ref Range    WBC 6.4 4.6 - 13.2 K/uL    RBC 3.22 (L) 4.20 - 5.30 M/uL    HGB 10.4 (L) 12.0 - 16.0 g/dL    HCT 33.3 (L) 35.0 - 45.0 %    .4 (H) 78.0 - 100.0 FL    MCH 32.3 24.0 - 34.0 PG    MCHC 31.2 31.0 - 37.0 g/dL    RDW 18.5 (H) 11.6 - 14.5 %    PLATELET 279 446 - 134 K/uL    MPV 9.7 9.2 - 11.8 FL    NRBC 0.0 0.0  WBC    ABSOLUTE NRBC 0.00 0.00 - 0.01 K/uL    NEUTROPHILS 49 40 - 73 %    LYMPHOCYTES 28 21 - 52 %    MONOCYTES 21 (H) 3 - 10 %    EOSINOPHILS 1 0 - 5 %    BASOPHILS 1 0 - 2 %    IMMATURE GRANULOCYTES 0 0 - 0.5 %    ABS. NEUTROPHILS 3.1 1.8 - 8.0 K/UL    ABS. LYMPHOCYTES 1.8 0.9 - 3.6 K/UL    ABS. MONOCYTES 1.3 (H) 0.05 - 1.2 K/UL    ABS. EOSINOPHILS 0.1 0.0 - 0.4 K/UL    ABS. BASOPHILS 0.1 0.0 - 0.1 K/UL    ABS. IMM.  GRANS. 0.0 0.00 - 0.04 K/UL    DF AUTOMATED     METABOLIC PANEL, COMPREHENSIVE    Collection Time: 09/12/22  4:35 AM   Result Value Ref Range    Sodium 135 (L) 136 - 145 mmol/L    Potassium 4.6 3.5 - 5.5 mmol/L    Chloride 98 (L) 100 - 111 mmol/L    CO2 23 21 - 32 mmol/L    Anion gap 14 3.0 - 18.0 mmol/L    Glucose 87 74 - 99 mg/dL    BUN 20 (H) 7 - 18 mg/dL    Creatinine 7.06 (H) 0.60 - 1.30 mg/dL    BUN/Creatinine ratio 3 (L) 12 - 20 GFR est AA 7 (L) >60 ml/min/1.73m2    GFR est non-AA 6 (L) >60 ml/min/1.73m2    Calcium 8.3 (L) 8.5 - 10.1 mg/dL    Bilirubin, total 0.6 0.2 - 1.0 mg/dL    AST (SGOT) 13 10 - 38 U/L    ALT (SGPT) 15 13 - 56 U/L    Alk. phosphatase 77 45 - 117 U/L    Protein, total 6.0 (L) 6.4 - 8.2 g/dL    Albumin 2.7 (L) 3.4 - 5.0 g/dL    Globulin 3.3 2.0 - 4.0 g/dL    A-G Ratio 0.8 0.8 - 1.7     MAGNESIUM    Collection Time: 09/12/22  4:35 AM   Result Value Ref Range    Magnesium 2.0 1.6 - 2.6 mg/dL   PHOSPHORUS    Collection Time: 09/12/22  4:35 AM   Result Value Ref Range    Phosphorus 3.6 2.5 - 4.9 mg/dL           Assessment/Plan:     Acute colitis:  Recently treated for acute diverticulitis recently and pancreatitis, with ciprofloxacin and Flagyl. Outpatient follow-up with GI status post colonoscopy however patient with persistent diarrhea over the last 3 months noted. CT abdomen showed mild irregularity of the right colonic margins suggestive of possible colitis, extensive colonic diverticula present without evidence of acute diverticulitis or obstruction. C. difficile still needs to be ruled out. Discontinued antibiotics since she was already recently treated for diverticulitis and she only shows mild colitis on the right side and does not have diverticulitis. She has recurrent chronic abdominal pain and was referred to a surgeon as an outpatient to be considered for a segmental resection of her left colon but per patient, there are no plans for surgical intervention at this time. Pending mesenteric duplex to rule out mesenteric ischemia since she does have history of atrial fibrillation and is at high risk for arterial embolism. Hyperkalemia  Improved after hemodialysis on 9/11/2022.     Hypertension:  Continue Coreg, Norvasc, Cozaar  Monitor blood pressure closely     Hyperlipidemia:  Continue statin     Atrial fibrillation:  Continue Coreg, amiodarone and Eliquis     ESRD on dialysis:  Consulted Dr. Juana Rene for hemodialysis  Hemodialysis days are Monday and Friday  Status post hemodialysis on 9/11/2022. Hypothyroidism:  Continue Synthroid     GERD:  Continue Dexilant    DVT prophylaxis  Currently on Eliquis. Care Plan discussed with: Patient/Family, Nurse, and     Total time spent with patient: 35 minutes. With greater than 50% spent in coordination of care and counseling.     Juan Wallace MD

## 2022-09-12 NOTE — PROGRESS NOTES
conducted an initial consultation and Spiritual Assessment for Sj Castillo, who is a 74 y.o.,female.      The reason the Patient came to the hospital is:   Patient Active Problem List    Diagnosis Date Noted    Moderate protein-calorie malnutrition ([facility] [address] 09/11/2022    Colitis 09/10/2022    Intractable nausea and vomiting 09/10/2022    Diverticulitis 08/28/2022    Pancreatitis 08/28/2022    Segmental colitis associated with diverticulosis ([address] 07/19/2022    Stenosis of left vertebral artery 04/12/2022    Nausea 04/07/2022    Weakness 04/07/2022    Fluid overload 02/02/2022    Atypical chest pain 12/24/2021    ESRD needing dialysis ([address] 12/24/2021    Mild persistent asthma with (acute) exacerbation 12/24/2021    Intractable vomiting 08/09/2021    Left leg pain 07/14/2021    Acute hyperkalemia 06/12/2021    Encounter for cholecystectomy 05/06/2021    Acute left-sided low back pain without sciatica 05/06/2021    Atrial fibrillation ([address] 05/06/2021    Chronic anticoagulation 05/06/2021    Stomatitis 03/02/2021    Thrush of mouth and esophagus ([address] 03/02/2021    ESRD (end stage renal disease) on dialysis ([address] 12/28/2020    History of DVT (deep vein thrombosis) 12/28/2020    Hypothyroidism 12/24/2020    Vertigo 12/24/2020    Calculus of gallbladder with acute cholecystitis without obstruction 10/09/2020    Acute recurrent maxillary sinusitis 08/06/2020    Ulcer     Obesity     Migraine     Hypertension     Hypercholesteremia     GERD (gastroesophageal reflux disease)     Burning with urination     Arrhythmia     Hyperkalemia 11/13/2016    Pruritus 09/29/2016    Chronic kidney disease, stage III (moderate) ([address] 09/27/2016    Recurrent urinary tract infection 09/27/2016    Nausea and vomiting 04/10/2016    Diverticulitis of intestine 04/02/2016    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    Gastritis and duodenitis 02/23/2015    Anemia 11/10/2014    Disease due to gram-negative bacillus 10/17/2014    Fever 10/14/2014    Drug-induced hyperglycemia 06/28/2014    Exacerbation of asthma 06/25/2014    Systemic inflammatory response syndrome (SIRS) ([facility] [address] 06/23/2014    Kidney transplant rejection 04/10/2014    Acute renal failure ([facility] [address] 04/07/2014    Renal transplant disorder 04/07/2014    Systemic [facility] 01/07/2014    Asthma 08/21/2013    Diverticular disease of large intestine 08/21/2013    Essential hypertension 08/21/2013    Seasonal allergic rhinitis due to pollen 08/21/2013    Hyperlipidemia 08/21/2013    UTI (urinary tract infection) 08/21/2013    Fecal incontinence 04/17/2013    History of kidney transplant 04/30/2012    CMV (cytomegalovirus) antibody positive 04/30/2012    Hematuria 04/30/2012    Migraine without status migrainosus, not intractable 02/24/2010    Renal cyst 01/01/2002        The  provided the following Interventions:  Initiated a relationship of care and support. Explored issues of cyrus, spirituality and/or Yarsani needs while hospitalized. Listened empathically. Provided chaplaincy education. Provided information about Spiritual Care Services. Offered prayer and assurance of continued prayers on patient's behalf. Chart reviewed. The following outcomes were achieved:  Patient shared some information about their medical narrative and spiritual journey/beliefs. Patient processed feeling about current hospitalization. Patient expressed gratitude for the 's visit. Assessment:  Patient did not indicate any spiritual or Yarsani issues which require Spiritual Care Services interventions at this time. Patient does not have any Yarsani/cultural needs that will affect patients preferences in health care. Plan:  Chaplains will continue to follow and will provide pastoral care on an as needed or requested basis.  recommends bedside caregivers page  on duty if patient shows signs of acute spiritual or emotional distress.       Per patient, not feeling her best but coping effectively. Patient discuss advance directive and per patient her daughter Juana Han is her decision maker for healthcare. Prayer of comfort provided for the patient.     [facility]   Staff [facility]   [phone]

## 2022-09-12 NOTE — PROGRESS NOTES
Problem: Falls - Risk of  Goal: *Absence of Falls  Description: Document [place] Fall Risk and appropriate interventions in the flowsheet.   Outcome: Progressing Towards Goal  Note: Fall Risk Interventions:  Mobility Interventions: Utilize walker, cane, or other assistive device         Medication Interventions: Teach patient to arise slowly    Elimination Interventions: Call light in reach

## 2022-09-13 LAB
C DIFF GDH STL QL: POSITIVE
C DIFF TOX A+B STL QL IA: NEGATIVE
INTERPRETATION: ABNORMAL
PCR REFLEX: ABNORMAL

## 2022-09-13 PROCEDURE — 74011250636 HC RX REV CODE- 250/636: Performed by: NURSE PRACTITIONER

## 2022-09-13 PROCEDURE — 97165 OT EVAL LOW COMPLEX 30 MIN: CPT

## 2022-09-13 PROCEDURE — 77010033678 HC OXYGEN DAILY

## 2022-09-13 PROCEDURE — 99222 1ST HOSP IP/OBS MODERATE 55: CPT | Performed by: INTERNAL MEDICINE

## 2022-09-13 PROCEDURE — 74011250637 HC RX REV CODE- 250/637: Performed by: NURSE PRACTITIONER

## 2022-09-13 PROCEDURE — 74011000250 HC RX REV CODE- 250: Performed by: STUDENT IN AN ORGANIZED HEALTH CARE EDUCATION/TRAINING PROGRAM

## 2022-09-13 PROCEDURE — 74011000250 HC RX REV CODE- 250: Performed by: INTERNAL MEDICINE

## 2022-09-13 PROCEDURE — 94640 AIRWAY INHALATION TREATMENT: CPT

## 2022-09-13 PROCEDURE — 65270000029 HC RM PRIVATE

## 2022-09-13 PROCEDURE — 97530 THERAPEUTIC ACTIVITIES: CPT

## 2022-09-13 PROCEDURE — 94761 N-INVAS EAR/PLS OXIMETRY MLT: CPT

## 2022-09-13 PROCEDURE — 74011000250 HC RX REV CODE- 250: Performed by: NURSE PRACTITIONER

## 2022-09-13 RX ADMIN — APIXABAN 2.5 MG: 2.5 TABLET, FILM COATED ORAL at 09:26

## 2022-09-13 RX ADMIN — SEVELAMER CARBONATE 800 MG: 800 TABLET, FILM COATED ORAL at 09:25

## 2022-09-13 RX ADMIN — APIXABAN 2.5 MG: 2.5 TABLET, FILM COATED ORAL at 17:25

## 2022-09-13 RX ADMIN — PANTOPRAZOLE SODIUM 40 MG: 40 TABLET, DELAYED RELEASE ORAL at 06:35

## 2022-09-13 RX ADMIN — IPRATROPIUM BROMIDE AND ALBUTEROL SULFATE 3 ML: .5; 3 SOLUTION RESPIRATORY (INHALATION) at 21:06

## 2022-09-13 RX ADMIN — SODIUM CHLORIDE, PRESERVATIVE FREE 10 ML: 5 INJECTION INTRAVENOUS at 21:00

## 2022-09-13 RX ADMIN — DICYCLOMINE HYDROCHLORIDE 10 MG: 10 CAPSULE ORAL at 09:25

## 2022-09-13 RX ADMIN — ONDANSETRON 4 MG: 2 INJECTION INTRAMUSCULAR; INTRAVENOUS at 01:34

## 2022-09-13 RX ADMIN — LEVOTHYROXINE SODIUM 75 MCG: 0.07 TABLET ORAL at 06:35

## 2022-09-13 RX ADMIN — AMIODARONE HYDROCHLORIDE 200 MG: 200 TABLET ORAL at 09:26

## 2022-09-13 RX ADMIN — AMLODIPINE BESYLATE 10 MG: 5 TABLET ORAL at 09:25

## 2022-09-13 RX ADMIN — IPRATROPIUM BROMIDE AND ALBUTEROL SULFATE 3 ML: .5; 3 SOLUTION RESPIRATORY (INHALATION) at 12:08

## 2022-09-13 RX ADMIN — CLOPIDOGREL BISULFATE 75 MG: 75 TABLET ORAL at 09:26

## 2022-09-13 RX ADMIN — MONTELUKAST 10 MG: 10 TABLET, FILM COATED ORAL at 09:25

## 2022-09-13 RX ADMIN — IPRATROPIUM BROMIDE AND ALBUTEROL SULFATE 3 ML: .5; 3 SOLUTION RESPIRATORY (INHALATION) at 15:11

## 2022-09-13 RX ADMIN — SEVELAMER CARBONATE 800 MG: 800 TABLET, FILM COATED ORAL at 21:00

## 2022-09-13 RX ADMIN — BUDESONIDE 250 MCG: 0.25 SUSPENSION RESPIRATORY (INHALATION) at 21:05

## 2022-09-13 RX ADMIN — IPRATROPIUM BROMIDE AND ALBUTEROL SULFATE 3 ML: .5; 3 SOLUTION RESPIRATORY (INHALATION) at 07:41

## 2022-09-13 RX ADMIN — ATORVASTATIN CALCIUM 10 MG: 10 TABLET, FILM COATED ORAL at 21:00

## 2022-09-13 RX ADMIN — LOSARTAN POTASSIUM 50 MG: 25 TABLET, FILM COATED ORAL at 09:26

## 2022-09-13 RX ADMIN — ONDANSETRON 4 MG: 2 INJECTION INTRAMUSCULAR; INTRAVENOUS at 17:25

## 2022-09-13 RX ADMIN — SODIUM CHLORIDE, PRESERVATIVE FREE 10 ML: 5 INJECTION INTRAVENOUS at 05:00

## 2022-09-13 RX ADMIN — CARVEDILOL 25 MG: 12.5 TABLET, FILM COATED ORAL at 17:25

## 2022-09-13 RX ADMIN — FLUTICASONE PROPIONATE 1 SPRAY: 50 SPRAY, METERED NASAL at 09:29

## 2022-09-13 RX ADMIN — MORPHINE SULFATE 2 MG: 2 INJECTION, SOLUTION INTRAMUSCULAR; INTRAVENOUS at 01:34

## 2022-09-13 RX ADMIN — CINACALCET HYDROCHLORIDE 30 MG: 30 TABLET, FILM COATED ORAL at 09:26

## 2022-09-13 RX ADMIN — CARVEDILOL 25 MG: 12.5 TABLET, FILM COATED ORAL at 09:25

## 2022-09-13 RX ADMIN — BUDESONIDE 250 MCG: 0.25 SUSPENSION RESPIRATORY (INHALATION) at 07:41

## 2022-09-13 NOTE — PROGRESS NOTES
1300- rounding on patient, resting with eyes closed, respirations even and unlabored    1500- rounding, no needs at this time    1728- scheduled medication administered    2103- scheduled medication administered     2156- per tessa, dialysis, patient will receive dialysis on her regular days, Monday Wednesday and Friday 2237- Ch[place] with [facility] will be here early tomorrow morning to give patient dialysis

## 2022-09-13 NOTE — RT PROTOCOL NOTE
RT Inhaler-Nebulizer Bronchodilator Protocol Note    There is a bronchodilator order in the chart from a provider indicating to follow the RT Bronchodilator Protocol and there is an Initiate RT Bronchodilator Protocol order as well (see protocol at bottom of note). CXR Findings:  No results found for this or any previous visit from the past 2 days. The findings from the last RT Protocol Assessment were as follows:  History of Pulmonary Disease: Chronic pulmonary disease  Respiratory Pattern: Regular pattern and RR 12-20 bpm  Breath Sounds: Clear breath sounds  Cough: Strong, spontaneous, non-productive  Indication for Bronchodilator Therapy: On home bronchodilators  Bronchodilator Assessment Score: 2    Aerosolized bronchodilator medication orders have been revised according to the RT Inhaler-Nebulizer Bronchodilator Protocol below. Respiratory Therapist to perform RT Therapy Protocol Assessment initially then follow the protocol. Repeat RT Therapy Protocol Assessment PRN for score 0-3 or on second treatment, BID, and PRN for scores above 3. No Indications - adjust the frequency to every 6 hours PRN wheezing or bronchospasm, if no treatments needed after 48 hours then discontinue using Per Protocol order mode. If indication present, adjust the RT bronchodilator orders based on the Bronchodilator Assessment Score as indicated below. Use Inhaler orders unless patient has one or more of the following: on home nebulizer, not able to hold breath for 10 seconds, is not alert and oriented, cannot activate and use MDI correctly, or respiratory rate 25 breaths per minute or more, then use the equivalent nebulizer order(s) with same Frequency and PRN reasons based on the score. If a patient is on this medication at home then do not decrease Frequency below that used at home.     0-3 - enter or revise RT bronchodilator order(s) to equivalent RT Bronchodilator order with Frequency of every 4 hours PRN for wheezing or increased work of breathing using Per Protocol order mode. 4-6 - enter or revise RT Bronchodilator order(s) to two equivalent RT bronchodilator orders with one order with BID Frequency and one order with Frequency of every 4 hours PRN wheezing or increased work of breathing using Per Protocol order mode. 7-10 - enter or revise RT Bronchodilator order(s) to two equivalent RT bronchodilator orders with one order with TID Frequency and one order with Frequency of every 4 hours PRN wheezing or increased work of breathing using Per Protocol order mode. 11-13 - enter or revise RT Bronchodilator order(s) to one equivalent RT bronchodilator order with QID Frequency and an Albuterol order with Frequency of every 4 hours PRN wheezing or increased work of breathing using Per Protocol order mode. Greater than 13 - enter or revise RT Bronchodilator order(s) to one equivalent RT bronchodilator order with every 4 hours Frequency and an Albuterol order with Frequency of every 2 hours PRN wheezing or increased work of breathing using Per Protocol order mode. RT to enter RT Home Evaluation for COPD & MDI Assessment order using Per Protocol order mode.     Electronically signed by Cristobal Aguilera on 9/12/2022 at 9:33 PM

## 2022-09-13 NOTE — PROGRESS NOTES
HOSPITALIST PROGRESS NOTE  Cirilo Wang MD, [address]          Daily Progress Note: 9/13/2022      Subjective:     Patient is alert and oriented x4. Continues to have significant nausea along with continued and persistent diarrhea. Also continues to have abdominal discomfort and pain. Tolerating minimally in terms of oral diet. Recently has been evaluated by GI status post colonoscopy on outpatient basis with resultant diagnosis of irritable bowel syndrome and diarrhea component. C. difficile was ordered over the weekend however finally collected yesterday evening and pending result. Also history of recent treatment with antibiotics including ciprofloxacin and Flagyl for acute diverticulitis just 1 month prior. Pending GI consultation as well and discussed with patient extensively. No overnight fever/chills, chest pain.       Medications reviewed  Current Facility-Administered Medications   Medication Dose Route Frequency    0.9% sodium chloride infusion  25 mL/hr IntraVENous DIALYSIS PRN    albuterol-ipratropium (DUO-NEB) 2.5 MG-0.5 MG/3 ML  3 mL Nebulization QID RT    ondansetron (ZOFRAN) injection 4 mg  4 mg IntraVENous Q4H PRN    sodium chloride (NS) flush 5-40 mL  5-40 mL IntraVENous Q8H    sodium chloride (NS) flush 5-40 mL  5-40 mL IntraVENous PRN    acetaminophen (TYLENOL) tablet 650 mg  650 mg Oral Q6H PRN    Or    acetaminophen (TYLENOL) suppository 650 mg  650 mg Rectal Q6H PRN    polyethylene glycol (MIRALAX) packet 17 g  17 g Oral DAILY PRN    amiodarone (CORDARONE) tablet 200 mg  200 mg Oral DAILY    amLODIPine (NORVASC) tablet 10 mg  10 mg Oral DAILY    apixaban (ELIQUIS) tablet 2.5 mg  2.5 mg Oral BID    carvediloL (COREG) tablet 25 mg  25 mg Oral BID WITH MEALS    cinacalcet (SENSIPAR) tablet 30 mg  30 mg Oral DAILY    clopidogreL (PLAVIX) tablet 75 mg  75 mg Oral DAILY    cyclobenzaprine (FLEXERIL) tablet 5 mg 5 mg Oral TID PRN    pantoprazole (PROTONIX) tablet 40 mg  40 mg Oral ACB    dicyclomine (BENTYL) capsule 10 mg  10 mg Oral DAILY    fluticasone propionate (FLONASE) 50 mcg/actuation nasal spray 1 Spray  1 Spray Both Nostrils DAILY    levothyroxine (SYNTHROID) tablet 75 mcg  75 mcg Oral ACB    losartan (COZAAR) tablet 50 mg  50 mg Oral DAILY    montelukast (SINGULAIR) tablet 10 mg  10 mg Oral DAILY    sevelamer carbonate (RENVELA) tab 800 mg  800 mg Oral TID    atorvastatin (LIPITOR) tablet 10 mg  10 mg Oral QHS    valGANciclovir (VALCYTE) tablet 900 mg - patient supplied (Patient Supplied)  900 mg Oral DAILY    zolpidem (AMBIEN) tablet 5 mg  5 mg Oral QHS PRN    albuterol (PROVENTIL VENTOLIN) nebulizer solution 2.5 mg  2.5 mg Nebulization Q4H PRN    budesonide (PULMICORT) 250 mcg/2ml nebulizer susp  250 mcg Nebulization BID RT       Review of Systems:   A comprehensive review of systems was negative except for that written in the HPI. Objective:   Physical Exam:     Visit Vitals  /87 (BP Patient Position: At rest)   Pulse 72   Temp 98.3 °F (36.8 °C)   Resp 16   Ht 5' 1\" (1.549 m)   Wt 64.4 kg (142 lb)   SpO2 93%   BMI 26.83 kg/m²      O2 Device: None (Room air)  Patient Vitals for the past 8 hrs:   Temp Pulse Resp BP SpO2   22 1512 -- -- -- -- 93 %   22 1209 -- -- -- -- 99 %   22 1200 -- 72 -- -- --   22 1115 98.3 °F (36.8 °C) 74 16 122/87 99 %   22 0830 98.2 °F (36.8 °C) 84 16 (!) 140/63 96 %   22 0800 -- 85 -- -- --          Temp (24hrs), Av °F (36.7 °C), Min:97.4 °F (36.3 °C), Max:98.5 °F (36.9 °C)    No intake/output data recorded. 1901 -  0700  In: 50 [I.V.:50]  Out: 1200     General:  Alert, cooperative, no distress, appears stated age. Lungs:   Clear to auscultation bilaterally. Chest wall:  No tenderness or deformity. Heart:  Regular rate and rhythm, S1, S2 normal, no murmur, click, rub or gallop.    Abdomen:   Soft, mild distention with tenderness to palpation diffusely noted. Bowel sounds normal. No masses,  No organomegaly. Extremities: Extremities normal, atraumatic, no cyanosis or edema. Pulses: 2+ and symmetric all extremities. Skin: Skin color, texture, turgor normal. No rashes or lesions   Neurologic: No gross sensory or motor deficits     Data Review:       Recent Days:  Recent Labs     09/12/22  0435 09/11/22  1710 09/10/22  1913   WBC 6.4 7.6 6.6   HGB 10.4* 11.8* 9.6*   HCT 33.3* 37.4 30.3*    207 167     Recent Labs     09/12/22  0435 09/11/22  1710 09/11/22  0323   * 136 134*   K 4.6 4.4 6.0*   CL 98* 98* 103   CO2 23 26 16*   GLU 87 70* 91   BUN 20* 20* 47*   CREA 7.06* 6.33* 10.60*   CA 8.3* 8.4* 7.9*   MG 2.0  --   --    PHOS 3.6  --   --    ALB 2.7* 2.9* 2.8*   TBILI 0.6 0.6 0.5   ALT 15 20 19     No results for input(s): PH, PCO2, PO2, HCO3, FIO2 in the last 72 hours. 24 Hour Results:  No results found for this or any previous visit (from the past 24 hour(s)). Assessment/Plan:     Acute colitis:  Recently treated for acute diverticulitis recently and pancreatitis, with ciprofloxacin and Flagyl. Outpatient follow-up with GI status post colonoscopy however patient with persistent diarrhea over the last 3 months noted. CT abdomen showed mild irregularity of the right colonic margins suggestive of possible colitis, extensive colonic diverticula present without evidence of acute diverticulitis or obstruction. C. difficile still needs to be ruled out. Discontinued antibiotics since she was already recently treated for diverticulitis and she only shows mild colitis on the right side and does not have diverticulitis. She has recurrent chronic abdominal pain and was referred to a surgeon as an outpatient to be considered for a segmental resection of her left colon but per patient, there are no plans for surgical intervention at this time.    Pending mesenteric duplex to rule out mesenteric ischemia since she does have history of atrial fibrillation and is at high risk for arterial embolism. Hyperkalemia  Improved after hemodialysis on 9/11/2022. Hypertension:  Continue Coreg, Norvasc, Cozaar  Monitor blood pressure closely     Hyperlipidemia:  Continue statin     Atrial fibrillation:  Continue Coreg, amiodarone and Eliquis     ESRD on dialysis:  Consulted Dr. Barb Rojas for hemodialysis  Hemodialysis days are Monday and Friday  Status post hemodialysis on 9/11/2022. Hypothyroidism:  Continue Synthroid     GERD:  Continue Dexilant    DVT prophylaxis  Currently on Eliquis. Care Plan discussed with: Patient/Family, Nurse, and     Total time spent with patient: 30 minutes. With greater than 50% spent in coordination of care and counseling.     Cooper Peña MD

## 2022-09-13 NOTE — PROGRESS NOTES
Patient asking for something for pain however has not eaten and is still complaining of nausea. Offered a ginger ale. Informed patient that she needed to try to eat something.

## 2022-09-13 NOTE — PROGRESS NOTES
Problem: Mobility Impaired (Adult and Pediatric)  Goal: *Acute Goals and Plan of Care (Insert Text)  Description: Physical Therapy Goals  Initiated 9/12/2022 and to be accomplished within 7 day(s)  1. Patient will move from supine to sit and sit to supine , scoot up and down, and roll side to side in bed with modified independence. 2.  Patient will transfer from bed to chair and chair to bed with modified independence using the least restrictive device. 3.  Patient will perform sit to stand with modified independence. 4.  Patient will ambulate with modified independence for 100 feet with the least restrictive device. 5.  Patient will ascend/descend 4 stairs with 2 handrail(s) with minimal assistance/contact guard assist.    PLOF: Community ambulator, no AD, (I) ADLs. Outcome: Progressing Towards Goal   PHYSICAL THERAPY TREATMENT     Patient: Guillermina Fair (65 y.o. female)  Date: 9/13/2022   Start Time: 6772   Stop Time: 7769        $$ Therapeutic Activity: 8-22 mins      Primary Diagnosis: Colitis [K52.9]  Intractable nausea and vomiting [R11.2]  Weakness [R53.1]  ESRD (end stage renal disease) on dialysis ([facility] [address] [N18.6, Z99.2]  Acute colitis [K52.9]       Precautions:   Contact (C-diff rule out)    ASSESSMENT :  Pt is poorly motivated. Agrees to participate however noted decreased effort. Pt comes to sitting without difficulty reported she feels dizzy. Pt stands with SPC noted Pt is unsteady and with LOB back onto bed. BP was checked in sitting and standing and was WNL. After coming to stand a second time Pt stated I cant do anymore. She declined exercise and further treatment. See below for treatment details. Progression toward goals: No progress this visit . Poorly motivated.    [x]      Improving appropriately and progressing toward goals  []      Improving slowly and progressing toward goals  []      Not making progress toward goals and plan of care will be adjusted       PLAN :  Patient continues to benefit from skilled intervention to address the above impairments. Continue treatment per established plan of care. Frequency/Duration: Patient will be followed by physical therapy 1-2 times per day/4-7 days per week to address goals. Discharge Recommendations: Home as prior  Further Equipment Recommendations for Discharge: straight cane     SUBJECTIVE:   Patient stated I don't feel like doing anything. Pt also reported feeling dizzy. OBJECTIVE DATA SUMMARY:       Functional Mobility:  Bed Mobility:  Rolling: Modified independent  Supine to Sit: Modified independent  Sit to Supine: Modified independent  Scooting: Modified independent  Transfers:  Sit to Stand: Contact guard assistance  Stand to Sit: Contact guard assistance    Balance:   Sitting: Intact  Standing: Impaired  Standing - Static: Fair  Standing - Dynamic : Fair  Ambulation/Gait Training:    Gait Abnormalities:  (declined)      Pain:  Pain level pre-treatment: 8/10   Pain level post-treatment: 8/10   Pain Location: Abdomen  Pain Intervention(s) : Medication (see MAR); Rest, Ice, Repositioning  Response to intervention: Nurse notified, See doc flow    Activity Tolerance:   Poor  Please refer to the flowsheet for vital signs taken during this treatment. After treatment:   []         Patient left in no apparent distress sitting up in chair  [x]         Patient left in no apparent distress in bed  [x]         Call bell left within reach  [x]         Nursing notified  []         Caregiver present  []         Bed alarm activated  []         SCDs applied    COMMUNICATION/EDUCATION:   [x]         Role of Physical Therapy in the acute care setting. [x]         Fall prevention education was provided and the patient/caregiver indicated understanding. [x]         Patient/family have participated as able in goal setting and plan of care. [x]         Patient/family agree to work toward stated goals and plan of care.   []         Patient understands intent and goals of therapy, but is neutral about his/her participation.   []         Patient is unable to participate in goal setting/plan of care: ongoing with therapy staff.  []         Other:      Tj Gale PTA   Time Calculation: 17 mins

## 2022-09-13 NOTE — PROGRESS NOTES
Problem: Falls - Risk of  Goal: *Absence of Falls  Description: Document Earma Grant Fall Risk and appropriate interventions in the flowsheet.   Outcome: Progressing Towards Goal  Note: Fall Risk Interventions:  Mobility Interventions: Utilize walker, cane, or other assistive device         Medication Interventions: Teach patient to arise slowly    Elimination Interventions: Call light in reach

## 2022-09-13 NOTE — PROGRESS NOTES
Problem: Risk for Spread of Infection  Goal: Prevent transmission of infectious organism to others  Description: Prevent the transmission of infectious organisms to other patients, staff members, and visitors. Outcome: Progressing Towards Goal     Problem: Patient Education:  Go to Education Activity  Goal: Patient/Family Education  Outcome: Progressing Towards Goal     Problem: Falls - Risk of  Goal: *Absence of Falls  Description: Document Saida Blood Fall Risk and appropriate interventions in the flowsheet. Outcome: Progressing Towards Goal  Note: Fall Risk Interventions:  Mobility Interventions: Utilize walker, cane, or other assistive device         Medication Interventions: Teach patient to arise slowly    Elimination Interventions: Call light in reach              Problem: Patient Education: Go to Patient Education Activity  Goal: Patient/Family Education  Outcome: Progressing Towards Goal     Problem: Pressure Injury - Risk of  Goal: *Prevention of pressure injury  Description: Document Quinn Scale and appropriate interventions in the flowsheet.   Outcome: Progressing Towards Goal  Note: Pressure Injury Interventions:  Sensory Interventions: Float heels    Moisture Interventions: Absorbent underpads    Activity Interventions: Assess need for specialty bed    Mobility Interventions: HOB 30 degrees or less    Nutrition Interventions: Document food/fluid/supplement intake                     Problem: Patient Education: Go to Patient Education Activity  Goal: Patient/Family Education  Outcome: Progressing Towards Goal     Problem: Chronic Renal Failure  Goal: *Fluid and electrolytes stabilized  Outcome: Progressing Towards Goal     Problem: Patient Education: Go to Patient Education Activity  Goal: Patient/Family Education  Outcome: Progressing Towards Goal     Problem: Nutrition Deficit  Goal: *Optimize nutritional status  Outcome: Progressing Towards Goal     Problem: Patient Education: Go to Patient Education Activity  Goal: Patient/Family Education  Outcome: Progressing Towards Goal

## 2022-09-13 NOTE — PROGRESS NOTES
0015- Assumed care of patient resting in bed no distress noted at this time. Patient still requesting pain medications however patient has not ate anything and is still complaining of nausea.

## 2022-09-13 NOTE — CONSULTS
Gastroenterology Consult Note       Primary GI Physician: Janeen Shaw MD    Referring Provider: Hospitalist team    Consult Date:  9/13/2022    Admit Date:  9/10/2022    Chief Complaint:  Diarrhea, nausea. Subjective:     History of Present Illness:  Patient is a 74 y.o. female who is seen in consultation for evaluation for nausea. diarrhea. The history is from the patient and their medical records. Patient is well-known to me from initial office evaluation 5/2622. I have included the history from that note below. She presents today with a several month history of diarrhea. She also had some nausea with vomiting. Her symptoms generally are ranging from asymptomatic to abdominal pain with nausea, vomiting, and diarrhea all at once. This admission her symptoms are typical of her presentation elsewhere. She still has renal dysfunction on hemodialysis with creatinine levels at 6.5-7. She states today that she is having continued nausea and vomiting as well as some abdominal discomfort and diarrhea. She is not receiving any Imodium for her diarrhea. CT scan of the abdomen/pelvis this admission revealed:    Impression  Atrophic native kidneys. Transplant kidney right lower quadrant demonstrating  perinephric stranding similar to the prior study. Lack of contrast limits  evaluation for pyelonephritis. There is no hydronephrosis however. Mild irregularity of the right colonic margins suggests possible colitis. Clinically correlate. Extensive colonic diverticula are present without evidence of acute  diverticulitis or obstruction. Is similar to prior CT scans including the 1 done in 2/2022. No evidence of acute diverticulitis at this time. When I saw the patient in the office, I told her that keeping her blood pressure under control and making sure she did not become dehydrated with fluid/cyst from her dialysis would be the optimal treatment short of surgery.   I did send her to a surgeon for evaluation. Surgery consultation 8/22/22 felt the patient had diverticulosis and has had diverticulitis in the past, but she may also have irritable bowel syndrome. He also states she could discuss the possibility of sigmoid colectomy again in the future with recurrent bouts. The patient states today that when she talked to the surgeon, she complained of constipation he suggested keeping her bowel movements soft and regular is important. She says she can go up to a week without a bowel movement and sit on the toilet with straining for up to 30 minutes. She is taking nothing for her constipation. During this is admission, the patient has an elevated lipase of 761, normal white blood cell count, hemoglobin/hematocrit equal 11.3/33. (5/26/22 Office visit note:    History of Present Illness:  Patient is a female BLACK/ 74 y.o. with a complicated medical history who is seen for evaluation for colon cancer screening and abdominal pain. The patient has GI complaints of abdominal pain 2/2022 and was admitted to [facility] with diarrhea, leukocytosis, and abdominal pain. She was seen by CONRAD Munguia at that time and underwent flexible sigmoidoscopy. This revealed diverticulosis in sigmoid colon but no evidence of inflammatory bowel disease. The 2/2022 CT scan of the abdomen and pelvis revealed extensive diverticulosis but no inflammatory bowel disease or infectious or other type of colitis. She did have an elevated ESR of 66, elevated CRP of 40.6, but stool cultures and C. difficile toxin were all negative. She was discharged home and is feeling much better. The patient has had multiple CT scans over the last 10 or more years. Some of these showed diverticulosis, a few if showed diverticulitis, and the several most recent CT scans showed some type of colitis or inflammatory state although flexible sigmoidoscopy did not show inflammation as above.     She was hospitalized 3/2022 and had acute descending colon diverticulitis, confirmed on 2 CT scans. Treated with IV then oral antibiotics. Repeat CT scan 4/2022 showed diverticulosis without diverticulitis. 5/9/22 CT scan: There is colitis of the ascending and transverse colon either infectious or inflammatory or ischemic. Correlate with lactate levels. Severely atrophic native kidneys with bilateral renal cysts. Hyperdense structures suggesting hemorrhagic or high proteinaceous cyst in the midpole the right kidney. Renal transplant in the right lower quadrant again with cysts and 2 hyperdense structures which may represent hemorrhagic or high proteinaceous cyst.    Colonic diverticulosis. No diverticilits mentioned. The patient denies nausea, vomiting, fever, chills, abdominal pain, reflux, dysphagia, change in bowel habits, diarrhea, constipation, weight loss, rectal bleeding, or melena. Last EGD-7/2020 which showed a hiatal hernia, nonobstructing Schatzki's ring, and gastritis. Last colonoscopy done 5/13/2022 elsewhere revealed: diverticulosis and large internal hemorrhoids. Random colon biopsies were done and showed some mild inflammation related to diverticular disease but not diverticulitis, and no evidence of microscopic colitis with no acute inflammation. Family history for GI disease is significant for no GI or liver disease. The patient denies liver related risk factors. Past medical history is extensive and significant for anemia, asthma, frequent urinary tract infections, chronic kidney disease on dialysis 3 times a week-Monday, Wednesday, Friday; failed kidney transplant, nonulcer dyspepsia with gastritis, esophageal reflux disease, hypercholesterolemia, hypertension, possible irritable bowel syndrome with use of Levsin, anxiety. Patient denies fever, chills,  rectal bleeding, melena, reflux, dysphagia, weight loss, constipation, or change in bowel habits. Last EGD-2017 as above.   Last colonoscopy-5/2022 as above). Past medical history is significant for anemia, anxiety, asthma, cholecystitis, chronic kidney disease requiring hemodialysis 3 times a week, COPD, polycystic kidney disease, diverticulosis and diverticulitis of the colon, hypertension, esophageal reflux, history of kidney transplant 2/2002, hyperlipidemia, hypertension, pyelonephritis, SBP, obesity in the past, vascular shunt for hemodialysis, history of peptic ulcer disease.     PMH:  Past Medical History:   Diagnosis Date    JEFF (acute kidney injury) ([address] 05/09/2019    Anemia NEC     Anxiety     Arrhythmia     Asthma     Asthma     Burning with urination     frequent uti    Cholecystitis 01/12/2019    Chronic kidney disease     dialysis    CMV (cytomegalovirus) antibody positive     COPD (chronic obstructive pulmonary disease) ([address]     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    Dialysis patient [facility])     M/W/F    Diverticular disease of colon 08/21/2013    Dyspepsia and other specified disorders of function of stomach     stomach ulcer    Elevated troponin 10/21/2019    Essential hypertension     GERD (gastroesophageal reflux disease)     History of chemotherapy     History of renal transplant 08/21/2013    (LD 2/12/2002)    HLD (hyperlipidemia)     Hypercholesteremia     Hypertension     Hypothyroidism 03/23/2022    Kidney transplant rejection     Menopause     Migraine     Obesity     Pyelonephritis 07/13/2020    Renal cyst 2002    kidney transplant     Spontaneous bacterial peritonitis ([address] 01/16/2019    Ulcer        PSH:  Past Surgical History:   Procedure Laterality Date    COLONOSCOPY      Dr Barbara Nolan  5yrs ago    ENDOSCOPY VISIT-OUTPATIENT      Dr Barbara Nolan  5 yrs ago    ENDOSCOPY, COLON, DIAGNOSTIC      with Dr. Torsten Burnett CHOLECYSTECTOMY  02/2021    HX COLONOSCOPY  05/13/2022    HX GI      ulcer    HX HEENT      sinus surg    HX OTHER SURGICAL  02/21/2022    SIGMOIDOSCOPY, FLEXIBLE;  Surgeon: Jasmin Taylor MD    HX RENAL TRANSPLANT      HX TRANSPLANT      kidney transplant 2002    VASCULAR SURGERY PROCEDURE UNLIST      shunt in prep for dialysis       Allergies: Allergies   Allergen Reactions    Aspirin Rash and Unknown (comments)    Bactrim [Sulfamethoxazole-Trimethoprim] Rash and Unknown (comments)    Bromfenac Rash and Unknown (comments)    Cefepime Other (comments)     Neurological reaction    Ceftriaxone Rash    Copper Rash    Ibuprofen Rash and Unknown (comments)    Ketorolac Tromethamine Rash and Unknown (comments)    Relafen [Nabumetone] Rash and Unknown (comments)    Rifampin Rash and Unknown (comments)    Vancomycin Rash and Unknown (comments)     Pt reports causes itching, takes benadryl prior to use. Pt denies anaphylaxis. Requires benadryl with each vancomycin dose       Home Medications:  Prior to Admission medications    Medication Sig Start Date End Date Taking? Authorizing Provider   ondansetron (ZOFRAN ODT) 4 mg disintegrating tablet Take 2 Tablets by mouth every eight (8) hours as needed for Nausea or Nausea or Vomiting. 9/10/22  Yes Dana Patterson,    amoxicillin-clavulanate (Augmentin) 875-125 mg per tablet Take 1 Tablet by mouth two (2) times a day. Patient taking differently: Take 1 Tablet by mouth two (2) times a day. Start 9/7/22 End 9/17/22 9/6/22   Mac Pete MD   metroNIDAZOLE (FlagyL) 500 mg tablet Take 1 Tablet by mouth three (3) times daily for 10 days. Patient taking differently: Take 500 mg by mouth three (3) times daily. Start: 9/7/22  End: 9/17/22 9/6/22 9/16/22  Deonna Pete MD   polyethylene glycol (Miralax) 17 gram packet Take 1 Packet by mouth daily. 9/6/22   Mac Pete MD   cinacalcet (SENSIPAR) 30 mg tablet Take 30 mg by mouth daily. Provider, Historical   clopidogreL (PLAVIX) 75 mg tab Take 75 mg by mouth daily. Provider, Historical   losartan (COZAAR) 50 mg tablet Take 1 Tablet by mouth daily.  8/26/22   Aiden More MD   cyclobenzaprine (FLEXERIL) 5 mg tablet Take 1 Tablet by mouth three (3) times daily as needed for Muscle Spasm(s). 8/18/22   May Enamorado MD   amiodarone (CORDARONE) 200 mg tablet TAKE 1 TABLET BY MOUTH EVERY DAY 8/16/22   May Enamorado MD   amLODIPine [facility]) 10 mg tablet Take 1 tablet by mouth once daily 8/9/22   May Enamorado MD   carvediloL (COREG) 25 mg tablet Take 1 Tablet by mouth two (2) times daily (with meals). 7/27/22   May Enamorado MD   meclizine (ANTIVERT) 25 mg tablet Take 1 Tablet by mouth three (3) times daily as needed for Dizziness. 7/27/22   May Enamorado MD   dexlansoprazole (Dexilant) 60 mg CpDB capsule (delayed release) Take 1 Capsule by mouth in the morning. 7/27/22   May Enamorado MD   fluticasone propionate [facility]) 50 mcg/actuation nasal spray Take 1 [place] by Both Nostrils route in the morning. 7/27/22   May Enamorado MD   levothyroxine (SYNTHROID) 75 mcg tablet Take 1 Tablet by mouth Daily (before breakfast). 7/27/22   May Enamorado MD   simvastatin (ZOCOR) 20 mg tablet TAKE 1 TABLET BY MOUTH DAILY AS DIRECTED. 7/27/22   May Enamorado MD   fluticasone-umeclidinium-vilanterol (Trelegy Ellipta) 100-62.5-25 mcg inhaler Take 1 Puff by inhalation in the morning. 7/27/22   May Enamorado MD   valGANciclovir (VALCYTE) 450 mg tablet Take 2 Tablets by mouth in the morning. 7/27/22   May Enamorado MD   dicyclomine (BENTYL) 10 mg capsule Take 1 Capsule by mouth in the morning. 7/27/22   May Enamorado MD   montelukast (SINGULAIR) 10 mg tablet Take 1 Tablet by mouth in the morning. 7/27/22   May Enamorado MD   hyoscyamine SL (LEVSIN/SL) 0.125 mg SL tablet 1 Tablet by SubLINGual route every four (4) hours as needed (irritable bowel). 7/27/22   May Enamorado MD   sucralfate (CARAFATE) 1 gram tablet TAKE 1 TABLET BY MOUTH FOUR TIMES DAILY 7/27/22   May Enamorado MD   [facility]) 2.5 mg tablet Take 1 Tablet by mouth two (2) times a day.  7/27/22   May Enamorado MD   lidocaine-prilocaine (EMLA) topical cream APPLY SMALL AMOUNT TO ACCESS SITE (AVF) 1 TO 2 HOURS BEFORE DIALYSIS. COVER WITH OCCLUSIVE DRESSING (SARAN WRAP) 6/27/22   Provider, Historical   zolpidem (AMBIEN) 5 mg tablet Take 1 Tablet by mouth nightly as needed for Sleep. Max Daily Amount: 5 mg. 5/30/22   Avani Gambino MD   predniSONE (DELTASONE) 5 mg tablet Take 1 Tablet by mouth daily. Provider, Historical   albuterol (PROVENTIL VENTOLIN) 2.5 mg /3 mL (0.083 %) nebu USE 1 VIAL VIA NEBULIZER THREE TIMES DAILY 12/23/21   Avani Gambino MD   calcitRIOL (ROCALTROL) 0.5 mcg capsule Take 0.5 mcg by mouth See Admin Instructions. Take on non dialysis days (Tues, Wed, Thur, Sat, Sun)  Dialysis is on Monday and Friday 7/19/21   Abelardo Yepez MD   RenaPlex-D 800 mcg-12.5 mg -2,000 unit tab Take 1 Tablet by mouth daily. 4/15/21   Abelardo Yepez MD   sevelamer carbonate (RENVELA) 800 mg tab tab Take 800 mg by mouth three (3) times daily. 6/8/21   Abelardo Yepez MD   aluminum & magnesium hydroxide-simethicone (Maalox Maximum Strength) 400-400-40 mg/5 mL suspension Take 10 mL by mouth every six (6) hours as needed for Indigestion. 9/17/20   Noris Tee MD   ESTRACE 0.01 % (0.1 mg/gram) vaginal cream INSERT 2 GRAMS INTO VAGINA EVERY MONDAY AND THURSDAY 4/11/17   Randy Lopez MD   cranberry extract 425 mg cap Take 425 mg by mouth daily.  1/17/14   Provider, Historical       Hospital Medications:  Current Facility-Administered Medications   Medication Dose Route Frequency    0.9% sodium chloride infusion  25 mL/hr IntraVENous DIALYSIS PRN    albuterol-ipratropium (DUO-NEB) 2.5 MG-0.5 MG/3 ML  3 mL Nebulization QID RT    ondansetron (ZOFRAN) injection 4 mg  4 mg IntraVENous Q4H PRN    sodium chloride (NS) flush 5-40 mL  5-40 mL IntraVENous Q8H    sodium chloride (NS) flush 5-40 mL  5-40 mL IntraVENous PRN    acetaminophen (TYLENOL) tablet 650 mg  650 mg Oral Q6H PRN    Or    acetaminophen (TYLENOL) suppository 650 mg  650 mg Rectal Q6H PRN    polyethylene glycol (MIRALAX) packet 17 g  17 g Oral DAILY PRN    amiodarone (CORDARONE) tablet 200 mg  200 mg Oral DAILY    amLODIPine (NORVASC) tablet 10 mg  10 mg Oral DAILY    apixaban (ELIQUIS) tablet 2.5 mg  2.5 mg Oral BID    carvediloL (COREG) tablet 25 mg  25 mg Oral BID WITH MEALS    cinacalcet (SENSIPAR) tablet 30 mg  30 mg Oral DAILY    clopidogreL (PLAVIX) tablet 75 mg  75 mg Oral DAILY    cyclobenzaprine (FLEXERIL) tablet 5 mg  5 mg Oral TID PRN    pantoprazole (PROTONIX) tablet 40 mg  40 mg Oral ACB    dicyclomine (BENTYL) capsule 10 mg  10 mg Oral DAILY    fluticasone propionate (FLONASE) 50 mcg/actuation nasal spray 1 Spray  1 Spray Both Nostrils DAILY    levothyroxine (SYNTHROID) tablet 75 mcg  75 mcg Oral ACB    losartan (COZAAR) tablet 50 mg  50 mg Oral DAILY    montelukast (SINGULAIR) tablet 10 mg  10 mg Oral DAILY    sevelamer carbonate (RENVELA) tab 800 mg  800 mg Oral TID    atorvastatin (LIPITOR) tablet 10 mg  10 mg Oral QHS    valGANciclovir (VALCYTE) tablet 900 mg - patient supplied (Patient Supplied)  900 mg Oral DAILY    zolpidem (AMBIEN) tablet 5 mg  5 mg Oral QHS PRN    albuterol (PROVENTIL VENTOLIN) nebulizer solution 2.5 mg  2.5 mg Nebulization Q4H PRN    budesonide (PULMICORT) 250 mcg/2ml nebulizer susp  250 mcg Nebulization BID RT       Social History:  Social History     Tobacco Use    Smoking status: Never    Smokeless tobacco: Never   Substance Use Topics    Alcohol use: No       Pt denies any history of drug use, blood transfusions. Family History:  Family History   Problem Relation Age of Onset    Diabetes Mother     Cervical Cancer Mother     Arthritis-rheumatoid Mother     Hypertension Father     Diabetes Father     Thyroid Disease Sister     Colon Polyps Brother        Review of Systems:  A detailed 10 system ROS is obtained, with pertinent positives as listed above. All others are negative as far as what is obtainable.     Diet: N.p.o.    Objective:     Physical Exam:  Vitals:  Visit Vitals  /87 (BP Patient Position: At rest)   Pulse 72   Temp 98.3 °F (36.8 °C)   Resp 16   Ht 5' 1\" (1.549 m)   Wt 64.4 kg (142 lb)   SpO2 93%   BMI 26.83 kg/m²     Gen:  Pt is alert, cooperative, no acute distress  Skin:  Extremities and face reveal no rashes. Hemodialysis fistula left arm  HEENT: Sclerae anicteric. Extra-occular muscles are intact. No oral ulcers. No abnormal pigmentation of the lips. The neck is supple. Cardiovascular: Regular rate and rhythm. No murmurs, gallops, or rubs. Respiratory:  Comfortable breathing with no accessory muscle use. Clear breath sounds anteriorly with no wheezes, rales, or rhonchi. GI:  Abdomen nondistended, soft, and nontender. Normal active bowel sounds. No enlargement of the liver or spleen. No masses palpable. Rectal:  Deferred  Musculoskeletal:  No pitting edema of the lower legs. Neurological:  Gross memory appears intact. Patient is alert and oriented. Psychiatric:  Mood appears appropriate with judgement intact. Lymphatic:  No cervical or supraclavicular adenopathy.     Laboratory:      Lab Results   Component Value Date    AML 45 05/25/2021    LPSE 761 (H) 09/11/2022     (L) 09/12/2022    K 4.6 09/12/2022    CL 98 (L) 09/12/2022    CO2 23 09/12/2022    AGAP 14 09/12/2022    GLU 87 09/12/2022    BUN 20 (H) 09/12/2022    CREA 7.06 (H) 09/12/2022    BUCR 3 (L) 09/12/2022    GFRAA 7 (L) 09/12/2022    GFRNA 6 (L) 09/12/2022    CA 8.3 (L) 09/12/2022    TBILI 0.6 09/12/2022    AST 13 09/12/2022    ALT 15 09/12/2022    AP 77 09/12/2022    TP 6.0 (L) 09/12/2022    ALB 2.7 (L) 09/12/2022    GLOB 3.3 09/12/2022    AGRAT 0.8 09/12/2022    WBC 6.4 09/12/2022    RBC 3.22 (L) 09/12/2022    HGB 10.4 (L) 09/12/2022    HCT 33.3 (L) 09/12/2022    .4 (H) 09/12/2022    MCH 32.3 09/12/2022    MCHC 31.2 09/12/2022    RDW 18.5 (H) 09/12/2022     09/12/2022    MPLV 9.7 09/12/2022    GRANS 49 09/12/2022    LYMPH 28 09/12/2022    MONOS 21 (H) 09/12/2022    EOS 1 09/12/2022    BASOS 1 09/12/2022    IG 0 09/12/2022    ANEU 3.1 09/12/2022    ABL 1.8 09/12/2022    ABM 1.3 (H) 09/12/2022    SUGAR 0.1 09/12/2022    ABB 0.1 09/12/2022    AIG 0.0 09/12/2022   results  Lab Results   Component Value Date    AML 45 05/25/2021    LPSE 761 (H) 09/11/2022     (L) 09/12/2022    K 4.6 09/12/2022    CL 98 (L) 09/12/2022    CO2 23 09/12/2022    AGAP 14 09/12/2022    GLU 87 09/12/2022    BUN 20 (H) 09/12/2022    CREA 7.06 (H) 09/12/2022    BUCR 3 (L) 09/12/2022    GFRAA 7 (L) 09/12/2022    GFRNA 6 (L) 09/12/2022    CA 8.3 (L) 09/12/2022    TBILI 0.6 09/12/2022    AST 13 09/12/2022    ALT 15 09/12/2022    AP 77 09/12/2022    TP 6.0 (L) 09/12/2022    ALB 2.7 (L) 09/12/2022    GLOB 3.3 09/12/2022    AGRAT 0.8 09/12/2022    WBC 6.4 09/12/2022    RBC 3.22 (L) 09/12/2022    HGB 10.4 (L) 09/12/2022    HCT 33.3 (L) 09/12/2022    .4 (H) 09/12/2022    MCH 32.3 09/12/2022    MCHC 31.2 09/12/2022    RDW 18.5 (H) 09/12/2022     09/12/2022    MPLV 9.7 09/12/2022    GRANS 49 09/12/2022    LYMPH 28 09/12/2022    MONOS 21 (H) 09/12/2022    EOS 1 09/12/2022    BASOS 1 09/12/2022    IG 0 09/12/2022    ANEU 3.1 09/12/2022    ABL 1.8 09/12/2022    ABM 1.3 (H) 09/12/2022    SUGAR 0.1 09/12/2022    ABB 0.1 09/12/2022    AIG 0.0 09/12/2022            CT Results (most recent):  Results from Hospital Encounter encounter on 09/10/22    CT ABD PELV WO CONT    Narrative  EXAM: CT of the abdomen and pelvis    INDICATION: Vomiting. Abdominal pain    COMPARISON: 9/6/2022    TECHNIQUE: Axial CT imaging of the abdomen and pelvis was performed without  intravenous or oral contrast. Multiplanar reformats were generated. One or more dose reduction techniques were used on this CT: automated exposure  control, adjustment of the mAs and/or kVp according to patient's size, and  iterative reconstruction techniques.  The specific techniques utilized on this CT  exam have been documented in the patient's electronic medical record. Digital Imaging and Communications in Medicine (DICOM) format image data are  available to nonaffiliated external healthcare facilities or entities on a  secure, media free, reciprocally searchable basis with patient authorization for  at least a 12-month period after this study. _______________    FINDINGS:    LOWER CHEST: Interval development of trace right pleural effusions since the  prior study. LIVER, BILIARY: Liver is normal. No biliary dilation. Cholecystectomy. PANCREAS: Atrophic. SPLEEN: Normal.    ADRENALS: Normal.    KIDNEYS/URETERS/BLADDER: Bilateral atrophic kidneys with cysts. PELVIC ORGANS: Right pelvic kidney demonstrates cystic mass with adjacent  perinephric stranding. There is no hydronephrosis. VASCULATURE: Prominent atherosclerotic calcifications noted without aneurysm. LYMPH NODES: No enlarged lymph nodes. GASTROINTESTINAL TRACT: Multiple colonic diverticula are present. Mild  irregularity of the right colonic margins suggests the possibility of possible  colitis but without obstruction. Appendix appears normal.    BONES: Degenerative changes are seen throughout the spine with partial fusion  seen in the thoracic segments. OTHER: Atrophic uterus. _______________    Impression  Atrophic native kidneys. Transplant kidney right lower quadrant demonstrating  perinephric stranding similar to the prior study. Lack of contrast limits  evaluation for pyelonephritis. There is no hydronephrosis however. Mild irregularity of the right colonic margins suggests possible colitis. Clinically correlate. Extensive colonic diverticula are present without evidence of acute  diverticulitis or obstruction. US Results (most recent):  Results from [place] encounter on 08/28/22    US GALLBLADDER    Narrative  EXAM: Limited right upper quadrant abdominal ultrasound    INDICATION: Increasing abdominal pain.     COMPARISON: Abdominal CT 08/28/2022    TECHNIQUE: Real-time sonography in multiple planes of the right upper quadrant  was performed with image documentation. Grayscale, color flow Doppler imaging,  and velocity spectral waveform analysis of the portal vein was performed (duplex  imaging). _______________    FINDINGS:    LIVER: The liver is within normal limits in echogenicity, without focal mass. Liver is normal in size, measuring 14 cm. Color flow Doppler and velocity  spectral waveform analysis of the portal vein shows normal (hepatopedal)  direction of flow. BILIARY SYSTEM: No intrahepatic biliary dilatation. Common bile duct is normal  in caliber, measuring 5 mm. GALLBLADDER: Cholecystectomy. TRANSPLANT RIGHT ILIAC FOSSA KIDNEY: Right kidney is small in size, measuring 8  cm in length. No hydronephrosis. No solid renal mass. Simple appearing cysts,  largest measuring up to 2 cm. No visible calculi. Increased cortical  echogenicity with cortical thinning typically seen with medical renal disease. PANCREAS: Head and body are unremarkable in appearance though the tail is  obscured by overlying bowel gas. IVC: Visualized portions are normal in caliber and patent. OTHER: No free intraperitoneal fluid.    _______________    Impression  1. Cholecystectomy. 2. No acute abnormality. MRI Results (most recent):  Results from Office Visit encounter on 05/09/12    MRI PELV WO CONT           Assessment:     Prior acute colitis of unknown etiology. This appears to be resolved. Recent repeat colonoscopy done 13 days ago showed no distinct ischemia or diverticulitis on biopsies. Stool studies were negative and flexible sigmoidoscopy showed no abnormality in the left colon. This could have been transient ischemia, due to microvascular disease or embolus since she does have a history of atrial fibrillation/flutter. A viral illness, or other etiology are also possibilities. It appears to have resolved.   She did have elevated sedimentation rate and CRP suggesting acute phase reactants. Again inflammatory bowel disease possibility, but less likely given prior negative colonoscopies and extensive diverticulosis without diverticulitis recently. 9/13/2022: Once again, I feel the patient likely had transient ischemia with some thickening of her colon on the right. She has had a colonoscopy with biopsies of this area within the last 6 months. This does not need to be repeated. I feel with the fluid shifts from dialysis and her heart disease, she likely has low-grade ischemia. Abdominal pain. Her records are extensive and I discussed in detail today what I think is occurring with this particular patient. Her daughter was also present. She has had 5 CT scans in the last 3 months. The first CT scan done during 3/2022  admission showed left-sided diverticulitis. A second done 2 weeks later showed resolving diverticulitis. 1 done in January, another in April, and a third done earlier this month did not show diverticulitis. It just showed diverticulosis. She did however have acute colitis on the CT scan as recently as 5/9/2022. I think she has 2 separate things occurring. The first is she has diverticulitis at times. There are other times she gets left-sided pain and I think she has SCAD (segmental colitis associated with diverticulosis) at times as well but not with diverticulitis. The the second, is acute low-grade colitis likely related to ischemia, which usually is not in the left colon and most recently was seen in the transverse and ascending colon. This however is mild as confirmed on her recent colonoscopy with biopsies. I feel she gets low-grade ischemia due to vascular disease, hypertension, and possibly even due to an embolus or with thrombus related to her atrial fibrillation. All these factors can cause low-grade ischemia.   Unfortunately treatment of her blood pressure making sure she is not dehydrated as best as can be done even with fluid shifts related to dialysis is the treatment for the latter. 9/13/2022: As before, I feel she likely has low-grade ischemia causing the right-sided findings on CT scan and the mild biopsies on colonoscopy. The left-sided abdominal pain I feel is due to SCAD (segmental colitis associated with diverticulosis) at times but not acute diverticulitis. She may also have low-grade ischemia secondary to ischemia. Irritable bowel syndrome is a possibility but I feel given the associated symptoms of nausea and vomiting and the above that scad and low-grade ischemia more likely. Short of medical therapy which she is not really responded to, and the only treatment of this ultimately would be surgery with sigmoid colectomy. I also agree with the surgeon that keeping her bowel movements regular is very important and may alleviate her getting the pain. Constipation appears to be a main issue and she usually only gets diarrhea when she has about of the abdominal pain, nausea, and vomiting with diarrhea. Colon cancer screening. She just recently had colonoscopy done 5/13/2022 and does not need a repeat study. Chronic hemodialysis with chronic renal failure. She has dialysis every Monday, Wednesday, Friday. History of atrial fibrillation/flutter. She is on amiodarone. Hyperlipidemia. She is on oral medication for this problem. Diverticulosis. This is apparently extensive. Hypothyroidism. She is on thyroid replacement. Failed kidney transplant. She is on hemodialysis. Anticoagulation use. The patient is on Plavix daily. Went to her chart she is also on Eliquis 2.5 mg daily. Esophageal reflux disease. Patient is on both sucralfate. Nausea and vomiting. I feel again this is due to pain, fluid shifts from hemodialysis, dehydration, medications, and her overall various medical problems.   Given her dialysis and fluid shifts, this may be a recurrent problem. Plan:     Recommend colon cancer screening 5/2032. Not need another colonoscopy at this time. Observation and reassurance. No further GI evaluation is indicated. I told the patient and her daughter for now, as she has diverticulitis which proved to be on her left side as she does not have diverticulosis on the right side, that this can be treated with antibiotics. However if she has persistent or recurrent symptoms and has SCAD, which can mimic symptoms of diverticulitis, she may need to have segmental resection of her left colon. 9/13/2022: Unfortunately, from a medical GI standpoint, there is little for this patient. I would suggest a trial of dicyclomine 10 mg p.o. up to 4 times a day. The choice is hers but I would recommend seeing the surgeon again about having sigmoid colectomy which I feel would be beneficial, as above. Other than surgery, I unfortunately feel we have nothing further to offer her. Regarding her transverse and right colon, this appears to at times get acute colitis, probably related to ischemia. I would not recommend having any resection here unless this was so severe she was developing gangrene or had severe acute colitis. 9/13/2022: The above recommendations unchanged from the office. Again reemphasized today that this is very complex and difficult problem and I think at this point we are kind of stuck medically. For her constipation, both in the hospital and as an outpatient, I suggested that she start miralax 1-3 glasses a day. Adjust to effect. Patient was told they will likely worsen before they improve and it may be a month or more to be effective. Also discussed the longer they have been constipated, the longer it will take to improve this problem and the associated gas, bloating, and abdominal pain. I agree this is absolutely important as far as decreasing the risk of having abdominal pain.     Use antiemetics such as Zofran, hydroxyzine, other on an as-needed basis both now and as an outpatient for nausea and vomiting. Further recommendations pending the patient's clinical course, if needed in the future. Thank you very much for this consultation. As above, very difficult problem and I have nothing further to offer this patient. I would suggest she be seen by surgery again upon discharge. I am available if needed if further questions should arise.         Allyn Mora MD

## 2022-09-13 NOTE — PROGRESS NOTES
OCCUPATIONAL THERAPY EVALUATION    Patient: Guillermina Fair (20 y.o. female)  Date: 9/13/2022  Primary Diagnosis: Colitis [K52.9]  Intractable nausea and vomiting [R11.2]  Weakness [R53.1]  ES[facility]end stage renal disease) [facility] [N18.6, Z99.2]  Acute colitis [K52.9]       Precautions:   Contact (C-diff rule out)  PLOF: pt lives at home with sister and has been requiring increasing level of A with ADLs and mobility in home secondary to declining medical issues     ASSESSMENT :  Based on the objective data described below, the patient presents with declines in basic ADLs and mobility. Patient will benefit from skilled intervention to address the above impairments. Patient's rehabilitation potential is considered to be Good  Factors which may influence rehabilitation potential include:   []             None noted  []             Mental ability/status  [x]             Medical condition  [x]             Home/family situation and support systems  []             Safety awareness  []             Pain tolerance/management  []             Other:      PLAN :  Recommendations and Planned Interventions:   [x]               Self Care Training                  [x]      Therapeutic Activities  [x]               Functional Mobility Training   []      Cognitive Retraining  [x]               Therapeutic Exercises           []      Endurance Activities  [x]               Balance Training                    []      Neuromuscular Re-Education  []               Visual/Perceptual Training     []      Home Safety Training                 Patient Education                   []      Family Training/Education  []               Other (comment):    Frequency/Duration: Patient will be followed by occupational therapy 4 times a week to address goals. Discharge Recommendations:  To Be Determined  Further Equipment Recommendations for Discharge: N/A     AM PAC score- 20/24    SUBJECTIVE:   Patient stated im ready to get this over with.    OBJECTIVE DATA SUMMARY:     Past Medical History:   Diagnosis Date    JEFF (acute kidney injury) ([facility] 05/09/2019    Anemia NEC     Anxiety     Arrhythmia     Asthma     Asthma     Burning with urination     frequent uti    Cholecystitis 01/12/2019    Chronic kidney disease     dialysis    CMV (cytomegalovirus) antibody positive     [facility]     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    Dialysis patient [facility])     M/W/F    Diverticular disease of colon 08/21/2013    Dyspepsia and other specified disorders of function of stomach     stomach ulcer    Elevated troponin 10/21/2019    Essential hypertension     GERD (gastroesophageal reflux disease)     History of chemotherapy     History of renal transplant 08/21/2013    (LD 2/12/2002)    HLD (hyperlipidemia)     Hypercholesteremia     Hypertension     Hypothyroidism 03/23/2022    Kidney transplant rejection     Menopause     Migraine     Obesity     Pyelonephritis 07/13/2020    Renal cyst 2002    kidney transplant     Spontaneous bacterial peritonitis ([facility] 01/16/2019    Ulcer      Past Surgical History:   Procedure Laterality Date    COLONOSCOPY      Dr Batool Jasso  5yrs ago    ENDOSCOPY VISIT-OUTPATIENT      Dr Batool Jasso  5 yrs ago    ENDOSCOPY, COLON, DIAGNOSTIC      with Dr. Tomeka Whitfield CHOLECYSTECTOMY  02/2021    HX COLONOSCOPY  05/13/2022    HX GI      ulcer    HX HEENT      sinus surg    HX OTHER SURGICAL  02/21/2022    SIGMOIDOSCOPY, FLEXIBLE;  Surgeon: Clara Trimble MD    HX RENAL TRANSPLANT      HX TRANSPLANT      kidney transplant 2002    VASCULAR SURGERY PROCEDURE UNLIST      shunt in prep for dialysis     Barriers to Learning/Limitations: None  Compensate with: visual, verbal, tactile, kinesthetic cues/model    Home Situation:   Home Situation  Home Environment: Private residence  # Steps to Enter: 4  Rails to Enter: Yes  Hand Rails : Bilateral  One/Two Story Residence: One story  Living Alone: No  Support Systems: Other Family Member(s), Child(lilibeth)  Patient Expects to be Discharged to[de-identified] Home  Current DME Used/Available at Home: None  [x]  Right hand dominant   []  Left hand dominant    Cognitive/Behavioral Status:  Neurologic State: Alert  Orientation Level: Oriented X4          Skin: intact   Edema: none noted     Vision/Perceptual:            WFLs                         Coordination: BUE    WFLs             Balance:    WFLs     Strength: BUE  3/5                    Tone & Sensation: BUE  Intact                             Range of Motion: BUE  WFLs                             Functional Mobility and Transfers for ADLs:  Bed Mobility:    CGA            Transfers:       CGA                                  ADL Assessment:     Grooming- CGA     UE dressing- supervision     LE dressing- min A     Toileting- min A             ADL Intervention:    Pt will benefit from skilled OT to maximize I with ADLs and mobility, to enable pt to return to home environment        Pain:  Pain level pre-treatment: 0/10   Pain level post-treatment: 0/10   Pain Intervention(s): Medication (see MAR); Rest, Ice, Repositioning   Response to intervention: Nurse notified, See doc flow    Activity Tolerance:   Good     Please refer to the flowsheet for vital signs taken during this treatment. After treatment:   [x] Patient left in no apparent distress sitting up in chair  [] Patient left in no apparent distress in bed  [x] Call bell left within reach  [x] Nursing notified  [] Caregiver present  [] Bed alarm activated    COMMUNICATION/EDUCATION:   [x] Role of Occupational Therapy in the acute care setting  [x] Home safety education was provided and the patient/caregiver indicated understanding. [x] Patient/family have participated as able in goal setting and plan of care. [] Patient/family agree to work toward stated goals and plan of care. [] Patient understands intent and goals of therapy, but is neutral about his/her participation.   [] Patient is unable to participate in goal setting and plan of care. Thank you for this referral.  Grisel Vidal, MS, OTR/L        [place] Complexity: History: MEDIUM Complexity : Expanded review of history including physical, cognitive and psychosocial  history ; Examination: MEDIUM Complexity : 3-5 performance deficits relating to physical, cognitive , or psychosocial skils that result in activity limitations and / or participation restrictions; Decision Making:MEDIUM Complexity : Patient may present with comorbidities that affect occupational performnce.  Miniml to moderate modification of tasks or assistance (eg, physical or verbal ) with assesment(s) is necessary to enable patient to complete evaluation

## 2022-09-14 VITALS
RESPIRATION RATE: 16 BRPM | SYSTOLIC BLOOD PRESSURE: 134 MMHG | TEMPERATURE: 98.2 F | OXYGEN SATURATION: 96 % | BODY MASS INDEX: 26.81 KG/M2 | DIASTOLIC BLOOD PRESSURE: 65 MMHG | HEART RATE: 80 BPM | HEIGHT: 61 IN | WEIGHT: 142 LBS

## 2022-09-14 LAB — C DIFF TOX GENS STL QL NAA+PROBE: NEGATIVE

## 2022-09-14 PROCEDURE — 94640 AIRWAY INHALATION TREATMENT: CPT

## 2022-09-14 PROCEDURE — 74011250636 HC RX REV CODE- 250/636: Performed by: NURSE PRACTITIONER

## 2022-09-14 PROCEDURE — 90935 HEMODIALYSIS ONE EVALUATION: CPT

## 2022-09-14 PROCEDURE — 74011000250 HC RX REV CODE- 250: Performed by: INTERNAL MEDICINE

## 2022-09-14 PROCEDURE — 94761 N-INVAS EAR/PLS OXIMETRY MLT: CPT

## 2022-09-14 PROCEDURE — 77010033678 HC OXYGEN DAILY

## 2022-09-14 PROCEDURE — 74011000250 HC RX REV CODE- 250: Performed by: NURSE PRACTITIONER

## 2022-09-14 PROCEDURE — 74011250637 HC RX REV CODE- 250/637: Performed by: NURSE PRACTITIONER

## 2022-09-14 PROCEDURE — 97116 GAIT TRAINING THERAPY: CPT

## 2022-09-14 PROCEDURE — 74011250637 HC RX REV CODE- 250/637: Performed by: INTERNAL MEDICINE

## 2022-09-14 RX ORDER — ONDANSETRON 4 MG/1
8 TABLET, ORALLY DISINTEGRATING ORAL
Qty: 15 TABLET | Refills: 0 | Status: SHIPPED | OUTPATIENT
Start: 2022-09-14

## 2022-09-14 RX ORDER — ONDANSETRON 4 MG/1
4 TABLET, ORALLY DISINTEGRATING ORAL ONCE
Status: COMPLETED | OUTPATIENT
Start: 2022-09-14 | End: 2022-09-14

## 2022-09-14 RX ORDER — POLYETHYLENE GLYCOL 3350 17 G/17G
17 POWDER, FOR SOLUTION ORAL 2 TIMES DAILY
Qty: 60 EACH | Refills: 2 | Status: SHIPPED | OUTPATIENT
Start: 2022-09-14

## 2022-09-14 RX ADMIN — IPRATROPIUM BROMIDE AND ALBUTEROL SULFATE 3 ML: .5; 3 SOLUTION RESPIRATORY (INHALATION) at 11:50

## 2022-09-14 RX ADMIN — SODIUM CHLORIDE, PRESERVATIVE FREE 10 ML: 5 INJECTION INTRAVENOUS at 05:33

## 2022-09-14 RX ADMIN — PANTOPRAZOLE SODIUM 40 MG: 40 TABLET, DELAYED RELEASE ORAL at 06:42

## 2022-09-14 RX ADMIN — LEVOTHYROXINE SODIUM 75 MCG: 0.07 TABLET ORAL at 06:42

## 2022-09-14 RX ADMIN — ONDANSETRON 4 MG: 4 TABLET, ORALLY DISINTEGRATING ORAL at 12:42

## 2022-09-14 RX ADMIN — ONDANSETRON 4 MG: 2 INJECTION INTRAMUSCULAR; INTRAVENOUS at 03:17

## 2022-09-14 RX ADMIN — ACETAMINOPHEN 650 MG: 325 TABLET ORAL at 06:44

## 2022-09-14 NOTE — PROGRESS NOTES
Problem: Mobility Impaired (Adult and Pediatric)  Goal: *Acute Goals and Plan of Care (Insert Text)  Description: Physical Therapy Goals  Initiated 2022 and to be accomplished within 7 day(s)  1. Patient will move from supine to sit and sit to supine , scoot up and down, and roll side to side in bed with modified independence. 2.  Patient will transfer from bed to chair and chair to bed with modified independence using the least restrictive device. 3.  Patient will perform sit to stand with modified independence. 4.  Patient will ambulate with modified independence for 100 feet with the least restrictive device. 5.  Patient will ascend/descend 4 stairs with 2 handrail(s) with minimal assistance/contact guard assist.    PLOF: Community ambulator, no AD, (I) ADLs. Outcome: Progressing Towards Goal   PHYSICAL THERAPY TREATMENT     Patient: Lenny Moffett (15 y.o. female)  Date: 2022   Start Time: 1123   Stop Time: 1139     $$ Gait Trainin-22 mins         Primary Diagnosis: Colitis [K52.9]  Intractable nausea and vomiting [R11.2]  Weakness [R53.1]  ESRD (end stage renal disease) on dialysis ([facility] [N18.6, Z99.2]  Acute colitis [K52.9]       Precautions:   Contact (C-diff rule out)    ASSESSMENT :  Pt is agreeable to PT. Pt comes to sitting than stands without difficulty. No LOB noted this session. Pt with no reported dizziness. Pt ambulates on Room first without AD then with [facility]. Pt is more steady with SPC. See below for treatment details. Progression toward goals: Pt was able to ambulate 90ft mod I  [x]      Improving appropriately and progressing toward goals  []      Improving slowly and progressing toward goals  []      Not making progress toward goals and plan of care will be adjusted       PLAN :  Patient continues to benefit from skilled intervention to address the above impairments. Continue treatment per established plan of care.       Frequency/Duration: Patient will be followed by physical therapy 1-2 times per day/4-7 days per week to address goals. Discharge Recommendations: Home as prior  Further Equipment Recommendations for Discharge: straight cane     SUBJECTIVE:   Patient stated I feel better today\". OBJECTIVE DATA SUMMARY:       Functional Mobility Training:  Bed Mobility:  Rolling: Independent  Supine to Sit: Independent  Sit to Supine: Independent  Scooting: Independent         Transfers:  Sit to Stand: Independent  Stand to Sit: Independent  Stand Pivot Transfers: Independent       Balance:  Sitting: Intact  Standing: Intact  Standing - Static: Good  Standing - Dynamic : Fair;Good    Ambulation/Gait Training:  Distance (ft): 90 Feet (ft)  Assistive Device: Cane, straight;Other (comment) (no AD)  Ambulation - Level of Assistance: Supervision;Modified independent   Noted improved safety and balance with SPC. Pt educated on using SPC at home for safety. Gait Abnormalities: Path deviations     Stairs:     Stairs - Level of Assistance:  (Pt is unable to leave room due to contact precautions)        Pain:  Pain level pre-treatment: 6/10   Pain level post-treatment: 6/10   Pain Location: Abdomen  Pain Intervention(s) : Medication (see MAR); Rest, Ice, Repositioning  Response to intervention: Nurse notified, See doc flow    Activity Tolerance:   Poor  Please refer to the flowsheet for vital signs taken during this treatment. After treatment:   []         Patient left in no apparent distress sitting up in chair  [x]         Patient left in no apparent distress in bed  [x]         Call bell left within reach  [x]         Nursing notified  []         Caregiver present  []         Bed alarm activated  []         SCDs applied    COMMUNICATION/EDUCATION:   [x]         Role of Physical Therapy in the acute care setting. [x]         Fall prevention education was provided and the patient/caregiver indicated understanding.   [x]         Patient/family have participated as able in goal setting and plan of care. [x]         Patient/family agree to work toward stated goals and plan of care. []         Patient understands intent and goals of therapy, but is neutral about his/her participation.   []         Patient is unable to participate in goal setting/plan of care: ongoing with therapy staff.  []         Other:      Elizabeth Speak, PTA   Time Calculation: 16 mins

## 2022-09-14 NOTE — PROGRESS NOTES
2315 - Report received from off going nurse. 0020 - Patient resting quietly in bed, decline assistance to turn & reposition at this time. Cup of ice provided per request. No additional concerns or needs voiced. 0172 - PRN ondansetron administered for c/o nausea. 0400 - PCT in room obtaining vital signs. No concerns or needs voiced at this time. 0485 - Report given to oncoming nurse.

## 2022-09-14 NOTE — DISCHARGE SUMMARY
Discharge Summary       PATIENT ID: Shar Moore  MRN: [id number]   YOB: 1955    DATE OF ADMISSION: 9/10/2022  5:42 PM    DATE OF DISCHARGE: 09/14/22    PRIMARY CARE PROVIDER: Kevin Recinos MD     ATTENDING PHYSICIAN: Jessa Haywood MD  DISCHARGING PROVIDER: Jessa Haywood MD        CONSULTATIONS: IP CONSULT TO NEPHROLOGY  IP CONSULT TO GASTROENTEROLOGY    PROCEDURES/SURGERIES: * No surgery found *    ADMITTING [address] [place] [address] COURSE:   Halima Demarco is a 74 y.o.  female who presents with a complaint of abdominal pain as well as nausea, vomiting and loose stools. The patient has a past medical history of diverticulitis, end-stage renal disease with renal transplant, dialysis Mondays and Fridays, COPD, hypertension, hypothyroidism and hyperlipidemia. The patient has had abdominal pain for about the last month and was recently admitted for acute diverticulitis, discharged home on 8/31/2022. She was again in the emergency room for the same complaints on 9/6/2022 but discharged home from the ED. She has been having episodes of nausea, vomiting and loose stools she reports. She states she is unable to keep medication down. She returned to the emergency room this evening for the above complaints as well as generalized weakness. She was prescribed Augmentin and Flagyl has been unable to come down secondary to nausea and vomiting. She reports feeling worse and that her last dialysis was this past Monday. Her nephrologist is Stacy Ribeiro. Labs obtained in the emergency room revealed a hemoglobin of 9.6, hematocrit of 30.3, potassium 3.2, BUN of 37, CO2 of 14, creatinine of 7.80, ALT of 13, AST of 11, lipase of 768, BNP of 8118. CT of the abdomen pelvis revealed mild irregularity of the right colonic margins suggestive of possible colitis. Extensive colonic diverticula present without evidence of acute diverticulitis or obstruction.      Patient was seen and examined by me resting in bed with no signs and symptoms of acute distress. Patient states that she has not moved from discharge or her last visit to the emergency room. She complains of inability to keep food or medication down. And states that she is getting weaker and unable to care for herself at this time. Missed dialysis on Friday, her last dialysis was on Monday 9/5. Patient was given IV Flagyl and Zosyn in the emergency room. Patient has been brought in for observation of possible colitis and generalized weakness. Her expected length of stay will be at least 24 to 48 hours. DISCHARGE DIAGNOSES / PLAN:      Assessment/Plan:      Acute colitis:  Recently treated for acute diverticulitis recently and pancreatitis, with ciprofloxacin and Flagyl. Outpatient follow-up with GI status post colonoscopy however patient with persistent diarrhea over the last 3 months noted. CT abdomen showed mild irregularity of the right colonic margins suggestive of possible colitis, extensive colonic diverticula present without evidence of acute diverticulitis or obstruction. C. difficile still needs to be ruled out. Discontinued antibiotics since she was already recently treated for diverticulitis and she only shows mild colitis on the right side and does not have diverticulitis. She has recurrent chronic abdominal pain and was referred to a surgeon as an outpatient to be considered for a segmental resection of her left colon but per patient, there are no plans for surgical intervention at this time. Hyperkalemia  Improved after hemodialysis on 9/11/2022. Hypertension:  Continue Coreg, Norvasc, Cozaar  Monitor blood pressure closely     Hyperlipidemia:  Continue statin     Atrial fibrillation:  Continue Coreg, amiodarone and Eliquis     ESRD on dialysis:  Consulted Dr. Nichole Briceno for hemodialysis  Hemodialysis days are Monday and Friday  Status post hemodialysis on 9/11/2022.      Hypothyroidism:  Continue Synthroid GERD:  Continue Dexilant     DVT prophylaxis  Currently on Eliquis. Pt is appropriate for dc, per GI suggest cont to follow up with your surgeon to continue evaluation for partial bowel resection. See thoughtful GI consult below:  Assessment:      Prior acute colitis of unknown etiology. This appears to be resolved. Recent repeat colonoscopy done 13 days ago showed no distinct ischemia or diverticulitis on biopsies. Stool studies were negative and flexible sigmoidoscopy showed no abnormality in the left colon. This could have been transient ischemia, due to microvascular disease or embolus since she does have a history of atrial fibrillation/flutter. A viral illness, or other etiology are also possibilities. It appears to have resolved. She did have elevated sedimentation rate and CRP suggesting acute phase reactants. Again inflammatory bowel disease possibility, but less likely given prior negative colonoscopies and extensive diverticulosis without diverticulitis recently. 9/13/2022: Once again, I feel the patient likely had transient ischemia with some thickening of her colon on the right. She has had a colonoscopy with biopsies of this area within the last 6 months. This does not need to be repeated. I feel with the fluid shifts from dialysis and her heart disease, she likely has low-grade ischemia. Abdominal pain. Her records are extensive and I discussed in detail today what I think is occurring with this particular patient. Her daughter was also present. She has had 5 CT scans in the last 3 months. The first CT scan done during 3/2022  admission showed left-sided diverticulitis. A second done 2 weeks later showed resolving diverticulitis. 1 done in January, another in April, and a third done earlier this month did not show diverticulitis. It just showed diverticulosis. She did however have acute colitis on the CT scan as recently as 5/9/2022.   I think she has 2 separate things occurring. The first is she has diverticulitis at times. There are other times she gets left-sided pain and I think she has SCAD (segmental colitis associated with diverticulosis) at times as well but not with diverticulitis. The the second, is acute low-grade colitis likely related to ischemia, which usually is not in the left colon and most recently was seen in the transverse and ascending colon. This however is mild as confirmed on her recent colonoscopy with biopsies. I feel she gets low-grade ischemia due to vascular disease, hypertension, and possibly even due to an embolus or with thrombus related to her atrial fibrillation. All these factors can cause low-grade ischemia. Unfortunately treatment of her blood pressure making sure she is not dehydrated as best as can be done even with fluid shifts related to dialysis is the treatment for the latter. 9/13/2022: As before, I feel she likely has low-grade ischemia causing the right-sided findings on CT scan and the mild biopsies on colonoscopy. The left-sided abdominal pain I feel is due to SCAD (segmental colitis associated with diverticulosis) at times but not acute diverticulitis. She may also have low-grade ischemia secondary to ischemia. Irritable bowel syndrome is a possibility but I feel given the associated symptoms of nausea and vomiting and the above that scad and low-grade ischemia more likely. Short of medical therapy which she is not really responded to, and the only treatment of this ultimately would be surgery with sigmoid colectomy. I also agree with the surgeon that keeping her bowel movements regular is very important and may alleviate her getting the pain. Constipation appears to be a main issue and she usually only gets diarrhea when she has about of the abdominal pain, nausea, and vomiting with diarrhea. Colon cancer screening.   She just recently had colonoscopy done 5/13/2022 and does not need a repeat study.    Chronic hemodialysis with chronic renal failure. She has dialysis every Monday, Wednesday, Friday. History of atrial fibrillation/flutter. She is on amiodarone. Hyperlipidemia. She is on oral medication for this problem. Diverticulosis. This is apparently extensive. Hypothyroidism. She is on thyroid replacement. Failed kidney transplant. She is on hemodialysis. Anticoagulation use. The patient is on Plavix daily. Went to her chart she is also on Eliquis 2.5 mg daily. Esophageal reflux disease. Patient is on both sucralfate. Nausea and vomiting. I feel again this is due to pain, fluid shifts from hemodialysis, dehydration, medications, and her overall various medical problems. Given her dialysis and fluid shifts, this may be a recurrent problem. Plan:      Recommend colon cancer screening 5/2032. Not need another colonoscopy at this time. Observation and reassurance. No further GI evaluation is indicated. I told the patient and her daughter for now, as she has diverticulitis which proved to be on her left side as she does not have diverticulosis on the right side, that this can be treated with antibiotics. However if she has persistent or recurrent symptoms and has SCAD, which can mimic symptoms of diverticulitis, she may need to have segmental resection of her left colon. 9/13/2022: Unfortunately, from a medical GI standpoint, there is little for this patient. I would suggest a trial of dicyclomine 10 mg p.o. up to 4 times a day. The choice is hers but I would recommend seeing the surgeon again about having sigmoid colectomy which I feel would be beneficial, as above. Other than surgery, I unfortunately feel we have nothing further to offer her. Regarding her transverse and right colon, this appears to at times get acute colitis, probably related to ischemia.   I would not recommend having any resection here unless this was so severe she was developing gangrene or had severe acute colitis. 9/13/2022: The above recommendations unchanged from the office. Again reemphasized today that this is very complex and difficult problem and I think at this point we are kind of stuck medically. For her constipation, both in the hospital and as an outpatient, I suggested that she start miralax 1-3 glasses a day. Adjust to effect. Patient was told they will likely worsen before they improve and it may be a month or more to be effective. Also discussed the longer they have been constipated, the longer it will take to improve this problem and the associated gas, bloating, and abdominal pain. I agree this is absolutely important as far as decreasing the risk of having abdominal pain. Use antiemetics such as Zofran, hydroxyzine, other on an as-needed basis both now and as an outpatient for nausea and vomiting. Further recommendations pending the patient's clinical course, if needed in the future. Thank you very much for this consultation. As above, very difficult problem and I have nothing further to offer this patient. I would suggest she be seen by surgery again upon discharge. I am available if needed if further questions should arise. FOLLOW UP APPOINTMENTS:    Follow-up Information       Follow up With Specialties Details Why Contact Info    José Miguel Magaña MD Family Medicine In 3 days  [facility]  [phone]                 DIET: Renal Diet        DISCHARGE MEDICATIONS:  Current Discharge Medication List        CONTINUE these medications which have CHANGED    Details   ondansetron (ZOFRAN ODT) 4 mg disintegrating tablet Take 2 Tablets by mouth every eight (8) hours as needed for Nausea or Nausea or Vomiting. Qty: 15 Tablet, Refills: 0  Start date: 9/14/2022      polyethylene glycol (Miralax) 17 gram packet Take 1 Packet by mouth two (2) times a day.   Qty: 60 Each, Refills: 2  Start date: 9/14/2022           CONTINUE these medications which have NOT CHANGED    Details   acetaminophen-codeine (TYLENOL #3) 300-30 mg per tablet TAKE 1 TABLET BY MOUTH EVERY 4 HOURS FOR UP TO 5 DAYS AS NEEDED      doxercalciferoL (HECTOROL) 4 mcg/2 mL injection 6 mcg by IntraVENous route. EPINEPHrine (EPIPEN) 0.3 mg/0.3 mL injection       epoetin jalen (EPOGEN;PROCRIT) 10,000 unit/mL injection 10,000 Units by SubCUTAneous route. tuberculin,purif.prot. deriv. (TUBERSOL ID) 0.1 mL by IntraDERMal route. cinacalcet (SENSIPAR) 30 mg tablet Take 30 mg by mouth daily. clopidogreL (PLAVIX) 75 mg tab Take 75 mg by mouth daily. losartan (COZAAR) 50 mg tablet Take 1 Tablet by mouth daily. Qty: 90 Tablet, Refills: 1    Associated Diagnoses: Hypertension, unspecified type      cyclobenzaprine (FLEXERIL) 5 mg tablet Take 1 Tablet by mouth three (3) times daily as needed for Muscle Spasm(s). Qty: 180 Tablet, Refills: 0      amiodarone (CORDARONE) 200 mg tablet TAKE 1 TABLET BY MOUTH EVERY DAY  Qty: 90 Tablet, Refills: 1      amLODIPine (NORVASC) 10 mg tablet Take 1 tablet by mouth once daily  Qty: 90 Tablet, Refills: 1    Associated Diagnoses: Hypertension, unspecified type      carvediloL (COREG) 25 mg tablet Take 1 Tablet by mouth two (2) times daily (with meals). Qty: 180 Tablet, Refills: 1      meclizine (ANTIVERT) 25 mg tablet Take 1 Tablet by mouth three (3) times daily as needed for Dizziness. Qty: 90 Tablet, Refills: 1    Associated Diagnoses: Stenosis of left vertebral artery      dexlansoprazole (Dexilant) 60 mg CpDB capsule (delayed release) Take 1 Capsule by mouth in the morning. Qty: 90 Capsule, Refills: 0    Associated Diagnoses: Gastroesophageal reflux disease with esophagitis without hemorrhage      fluticasone propionate (FLONASE) 50 mcg/actuation nasal spray Take 1 Powell by Both Nostrils route in the morning.   Qty: 1 Each, Refills: 1      levothyroxine (SYNTHROID) 75 mcg tablet Take 1 Tablet by mouth Daily (before breakfast). Qty: 90 Tablet, Refills: 1      simvastatin (ZOCOR) 20 mg tablet TAKE 1 TABLET BY MOUTH DAILY AS DIRECTED. Qty: 90 Tablet, Refills: 1      fluticasone-umeclidinium-vilanterol (Trelegy Ellipta) 100-62.5-25 mcg inhaler Take 1 Puff by inhalation in the morning. Qty: 60 Each, Refills: 1      valGANciclovir (VALCYTE) 450 mg tablet Take 2 Tablets by mouth in the morning. Qty: 180 Tablet, Refills: 1    Associated Diagnoses: ESRD (end stage renal disease) on dialysis (HCC)      dicyclomine (BENTYL) 10 mg capsule Take 1 Capsule by mouth in the morning. Qty: 90 Capsule, Refills: 1      montelukast (SINGULAIR) 10 mg tablet Take 1 Tablet by mouth in the morning. Qty: 90 Tablet, Refills: 1      hyoscyamine SL (LEVSIN/SL) 0.125 mg SL tablet 1 Tablet by SubLINGual route every four (4) hours as needed (irritable bowel). Qty: 30 Tablet, Refills: 2    Associated Diagnoses: Gastroesophageal reflux disease with esophagitis without hemorrhage      sucralfate (CARAFATE) 1 gram tablet TAKE 1 TABLET BY MOUTH FOUR TIMES DAILY  Qty: 360 Tablet, Refills: 1      apixaban (ELIQUIS) 2.5 mg tablet Take 1 Tablet by mouth two (2) times a day. Qty: 180 Tablet, Refills: 1      lidocaine-prilocaine (EMLA) topical cream APPLY SMALL AMOUNT TO ACCESS SITE (AVF) 1 TO 2 HOURS BEFORE DIALYSIS. COVER WITH OCCLUSIVE DRESSING (SARAN WRAP)      zolpidem (AMBIEN) 5 mg tablet Take 1 Tablet by mouth nightly as needed for Sleep. Max Daily Amount: 5 mg. Qty: 30 Tablet, Refills: 0    Associated Diagnoses: Primary insomnia      predniSONE (DELTASONE) 5 mg tablet Take 1 Tablet by mouth daily. albuterol (PROVENTIL VENTOLIN) 2.5 mg /3 mL (0.083 %) nebu USE 1 VIAL VIA NEBULIZER THREE TIMES DAILY  Qty: 90 mL, Refills: 3    Associated Diagnoses: Mild intermittent asthma without complication      calcitRIOL (ROCALTROL) 0.5 mcg capsule Take 0.5 mcg by mouth See Admin Instructions.  Take on non dialysis days (Tues, Wed, Thur, Sat, Sun)  Dialysis is on Monday and Friday      RenaPlex-D 800 mcg-12.5 mg -2,000 unit tab Take 1 Tablet by mouth daily. sevelamer carbonate (RENVELA) 800 mg tab tab Take 800 mg by mouth three (3) times daily. aluminum & magnesium hydroxide-simethicone (Maalox Maximum Strength) 400-400-40 mg/5 mL suspension Take 10 mL by mouth every six (6) hours as needed for Indigestion. Qty: 250 mL, Refills: 0      ESTRACE 0.01 % (0.1 mg/gram) vaginal cream INSERT 2 GRAMS INTO VAGINA EVERY MONDAY AND THURSDAY  Qty: 42.5 g, Refills: 5      cranberry extract 425 mg cap Take 425 mg by mouth daily. STOP taking these medications       amoxicillin-clavulanate (Augmentin) 875-125 mg per tablet Comments:   Reason for Stopping:         metroNIDAZOLE (FlagyL) 500 mg tablet Comments:   Reason for Stopping:         HYDROcodone-acetaminophen (Norco) 5-325 mg per tablet Comments:   Reason for Stopping:                 NOTIFY YOUR PHYSICIAN FOR ANY OF THE FOLLOWING:   Fever over 101 degrees for 24 hours. Chest pain, shortness of breath, fever, chills, nausea, vomiting, diarrhea, change in mentation, falling, weakness, bleeding. Severe pain or pain not relieved by medications. Or, any other signs or symptoms that you may have questions about. DISPOSITION:home    Home With:   OT  PT  HH  RN       Long term SNF/Inpatient Rehab    Independent/assisted living    Hospice    Other:       PATIENT CONDITION AT DISCHARGE:     Functional status    Poor     Deconditioned    x Independent      Cognition   x  Lucid     Forgetful     Dementia      Catheters/lines (plus indication)    Jackson     PICC     PEG    x None      Code status    x Full code     DNR      PHYSICAL EXAMINATION AT DISCHARGE:  General:          Alert, cooperative, no distress, appears stated age.      HEENT:           Atraumatic, anicteric sclerae, pink conjunctivae                          No oral ulcers, mucosa moist, throat clear, dentition fair  Neck:               Supple, symmetrical  Lungs:             Clear to auscultation bilaterally. No Wheezing or Rhonchi. No rales. Chest wall:      No tenderness  No Accessory muscle use. Heart:              Regular  rhythm,  No  murmur   No edema  Abdomen:        Soft, mild-tender. Not distended. Bowel sounds normal  Extremities:     No cyanosis. No clubbing,                            Skin turgor normal, Capillary refill normal  Skin:                Not pale. Not Jaundiced  No rashes   Psych:             Not anxious or agitated.   Neurologic:      Alert, moves all extremities, answers questions appropriately and responds to commands       CHRONIC MEDICAL DIAGNOSES:  Problem List as of 9/14/2022 Date Reviewed: 5/26/2022            Codes Class Noted - Resolved    Acute colitis ICD-10-CM: K52.9  ICD-9-CM: 558.9  9/12/2022 - Present        Moderate protein-calorie malnutrition ([facility] ICD-10-CM: E44.0  ICD-9-CM: 263.0  9/11/2022 - Present        Colitis ICD-10-CM: K52.9  ICD-9-CM: 558.9  9/10/2022 - Present        Intractable nausea and vomiting ICD-10-CM: R11.2  ICD-9-CM: 536.2  9/10/2022 - Present        Diverticulitis ICD-10-CM: K57.92  ICD-9-CM: 562.11  8/28/2022 - Present        Pancreatitis ICD-10-CM: K85.90  ICD-9-CM: 427.6  8/28/2022 - Present        Segmental colitis associated with diverticulosis ([facility] ICD-10-CM: K50.10, K57.30  ICD-9-CM: 555.1, 562.10  7/19/2022 - Present        Stenosis of left vertebral artery ICD-10-CM: I65.02  ICD-9-CM: 433.20  4/12/2022 - Present        Nausea ICD-10-CM: R11.0  ICD-9-CM: 787.02  4/7/2022 - Present        Weakness ICD-10-CM: R53.1  ICD-9-CM: 780.79  4/7/2022 - Present        Fluid overload ICD-10-CM: E87.70  ICD-9-CM: 276.69  2/2/2022 - Present        Atypical chest pain ICD-10-CM: R07.89  ICD-9-CM: 786.59  12/24/2021 - Present        ESRD needing dialysis [facility]) ICD-10-CM: N18.6, Z99.2  ICD-9-CM: 585.6  12/24/2021 - Present        Mild persistent asthma with (acute) exacerbation ICD-10-CM: J45.31  ICD-9-CM: 493.92  12/24/2021 - Present        Intractable vomiting ICD-10-CM: R11.10  ICD-9-CM: 536.2  8/9/2021 - Present        Left leg pain ICD-10-CM: M79.605  ICD-9-CM: 729.5  7/14/2021 - Present        Acute hyperkalemia ICD-10-CM: E87.5  ICD-9-CM: 276.7  6/12/2021 - Present        Encounter for cholecystectomy ICD-10-CM: Z76.89  ICD-9-CM: V65.8  5/6/2021 - Present    Overview Signed 5/6/2021  9:13 AM by Velasquez Wild MD     7/2020             Acute left-sided low back pain without sciatica ICD-10-CM: M54.50  ICD-9-CM: 724.2  5/6/2021 - Present        Atrial fibrillation ([facility] ICD-10-CM: I48.91  ICD-9-CM: 427.31  5/6/2021 - Present        Chronic anticoagulation ICD-10-CM: Z79.01  ICD-9-CM: V58.61  5/6/2021 - Present        Stomatitis ICD-10-CM: K12.1  ICD-9-CM: 528.00  3/2/2021 - Present        Thrush of mouth and esophagus (HCC) ICD-10-CM: B37.81, B37.0  ICD-9-CM: 112.84, 112.0  3/2/2021 - Present        ESRD (end stage renal disease) on dialysis [facility]) ICD-10-CM: N18.6, Z99.2  ICD-9-CM: 585.6, V45.11  12/28/2020 - Present        History of DVT (deep vein thrombosis) ICD-10-CM: Q40.706  ICD-9-CM: V12.51  12/28/2020 - Present        Hypothyroidism ICD-10-CM: E03.9  ICD-9-CM: 244.9  12/24/2020 - Present        Vertigo ICD-10-CM: R42  ICD-9-CM: 780.4  12/24/2020 - Present        Calculus of gallbladder with acute cholecystitis without obstruction ICD-10-CM: K80.00  ICD-9-CM: 574.00  10/9/2020 - Present        Acute recurrent maxillary sinusitis ICD-10-CM: J01.01  ICD-9-CM: 461.0  8/6/2020 - Present        Ulcer ICD-10-CM: [id number]  ICD-9-CM: 707.9  Unknown - Present        Obesity ICD-10-CM: E66.9  ICD-9-CM: 278.00  Unknown - Present        Migraine ICD-10-CM: S62.105  ICD-9-CM: 346.90  Unknown - Present        Hypertension ICD-10-CM: I10  ICD-9-CM: 401.9  Unknown - Present        Hypercholesteremia ICD-10-CM: E78.00  ICD-9-CM: 272.0  Unknown - Present        GERD (gastroesophageal reflux disease) ICD-10-CM: K21.9  ICD-9-CM: 530.81  Unknown - Present        Burning with urination ICD-10-CM: R30.0  ICD-9-CM: 788.1  Unknown - Present    Overview Signed 2/13/2017 12:02 PM by Patria LLANOS     frequent uti             Arrhythmia ICD-10-CM: I49.9  ICD-9-CM: 427.9  Unknown - Present        Hyperkalemia ICD-10-CM: E87.5  ICD-9-CM: 276.7  11/13/2016 - Present        Pruritus ICD-10-CM: L29.9  ICD-9-CM: 698.9  9/29/2016 - Present        Chronic kidney disease, stage III (moderate) ([facility] ICD-10-CM: N18.30  ICD-9-CM: 585.3  9/27/2016 - Present        Recurrent urinary tract infection ICD-10-CM: N39.0  ICD-9-CM: 599.0  9/27/2016 - Present        Nausea and vomiting ICD-10-CM: R11.2  ICD-9-CM: 787.01  4/10/2016 - Present        Diverticulitis of intestine ICD-10-CM: K57.92  ICD-9-CM: 562.11  4/2/2016 - Present        Cystic disease of kidney ICD-10-CM: Q61.9  ICD-9-CM: 753.10  3/16/2015 - Present        Gastritis and duodenitis ICD-10-CM: K29.90  ICD-9-CM: 535.50  2/23/2015 - Present        Anemia ICD-10-CM: D64.9  ICD-9-CM: 285.9  11/10/2014 - Present        Disease due to gram-negative bacillus ICD-10-CM: A49.9  ICD-9-CM: 041.85  10/17/2014 - Present    Overview Signed 2/13/2017 11:58 AM by Patria LLANOS     Overview:   Acinetobacter baumannii Septicemia and UTI 10/14/2014 (cultures at [facility])             Fever ICD-10-CM: R50.9  ICD-9-CM: 780.60  10/14/2014 - Present        Drug-induced hyperglycemia ICD-10-CM: R73.9, T50.905A  ICD-9-CM: 790.29, E947.9  6/28/2014 - Present        Exacerbation of asthma ICD-10-CM: J45.901  ICD-9-CM: 493.92  6/25/2014 - Present        Systemic inflammatory response syndrome (SIRS) (HCC) ICD-10-CM: R65.10  ICD-9-CM: 995.90  6/23/2014 - Present        Kidney transplant rejection ICD-10-CM: T86.11  ICD-9-CM: 996.81  4/10/2014 - Present    Overview Signed 2/13/2017 11:58 AM by Patria LLANOS     Overview:   Collected: Arjun Ornelas Dr: Marito Esteves MD    Case Number: [id number]  [address] [place] [address]    Case Number: [id number]    DIAGNOSIS:  ----------  ALLOGRAFT KIDNEY, NEEDLE BIOPSY (14 YEARS, 2 MONTHS):  - SUBOPTIMAL SPECIMEN: RENAL MEDULLA, INCLUDING DEEP MEDULLA WITH  FOCAL BENIGN UROTHELIAL EPITHELIUM. - MILD NEUTROPHILIC TUBULITIS WITH INTRALUMINAL NEUTROPHILS,  CORRELATE WITH URINE CULTURE TO RULE OUT BACTERIAL INFECTION. - MILD LYMPHOCYTIC TUBULITIS CONSISTENT WITH BORDERLINE CHANGE:  (\"SUSPICIOUS\" FOR ACUTE CELLULAR REJECTION) AS PER BANFF SCHEMA. - CONGESTED PERITUBULAR CAPILLARIES (NON-SPECIFIC), BUT RENAL  VEIN THROMBOSIS OR STENOSIS SHOULD BE EXCLUDED CLINICALLY. - FOCAL SMALL INTERSTITIAL COLLECTION OF CAST MATERIAL  (NON-SPECIFIC), BUT  URINARY OBSTRUCTION SHOULD BE EXCLUDED CLINICALLY. - NO DEFINITIVE STAINING FOR POLYOMAVIRUSES (SEE DESCRIPTION). - FOCAL C4D REACTIVITY ALONG PERITUBULAR CAPILLARY WALLS WITH  HIGH NON-SPECIFIC BACKGROUND STAINING; SIGNIFICANCE UNCERTAIN AND  CORRELATION WITH FLOW PRA IS SUGGESTED TO EXCLUDE EARLY HUMORAL  REJECTION. - SMALL VESSEL SCLEROSIS, MILD TO MODERATE TO SEVERE.  - TUBULAR ATROPHY AND INTERSTITIAL FIBROSIS CANNOT BE ADEQUATELY  ASSESSED  IN THE ABSENCE OF RENAL CORTEX. Re-Bx - Collected: Daisha Hutchison Dr: Lady Wong MD    Case Number: [id number]  [address] [place] [address]    Case Number: [id number]    DIAGNOSIS:  ----------  ALLOGRAFT KIDNEY, NEEDLE BIOPSY (12 YEARS, 2 MONTHS):  - BORDERLINE CHANGE (\"SUSPICIOUS\" FOR ACUTE CELLULAR REJECTION)  TO FOCAL  MILD ACUTE CELLULAR REJECTION (BANFF TYPE 1A) (SEE COMMENT). - MILD NEUTROPHILIC INTERSTITIAL INFLAMMATION, CORRELATE WITH  URINE CULTURE  TO RULE OUT SUPERIMPOSED BACTERIAL INFECTION. - SMALL INTERSTITIAL COLLECTIONS OF PAS-POSITIVE CAST MATERIAL  AND CYSTICALLY DILATED PACKER'S SPACE WITH PAS-POSITIVE  PROTEINACEOUS FILTRATE (SEE  COMMENT).   - ACUTE ISCHEMIC-TYPE TUBULAR INJURY IS NOTED.  - FOCAL C4D REACTIVITY ALONG PERITUBULAR CAPILLARY WALLS;  SIGNIFICANCE  UNCERTAIN AND CORRELATION WITH FLOW PRA IS SUGGESTED TO EXCLUDE  EARLY  HUMORAL REJECTION. - NEGATIVE IMMUNOSTAIN FOR POLYOMAVIRUSES (BK, HOMER). - CHRONIC CHANGES: GLOBAL SCLEROSIS IN APPROXIMATELY 14 OUT OF 60  (23%)  GLOMERULI, FOCAL SEGMENTAL GLOMERULOSCLEROSIS IN APPROXIMATELY  2 OUT OF  46 (4%) NON-OBSOLESCENT GLOMERULI, MODERATE TO SEVERE  ARTERIOSCLEROSIS,  MILD TO MODERATE TO SEVERE ARTERIOLAR HYALINE SCLEROSIS, AND  MODERATE  INTERSTITIAL FIBROSIS/TUBULAR ATROPHY (SEE COMMENT).              Acute renal failure ([facility] [email or web address] 75.) ICD-10-CM: N17.9  ICD-9-CM: 584.9  4/7/2014 - Present        Renal transplant disorder ICD-10-CM: T86.10  ICD-9-CM: 996.81  4/7/2014 - Present        Systemic infection ([facility] [[email or web address] 75.) ICD-10-CM: A41.9  ICD-9-CM: 038.9  1/7/2014 - Present        Asthma ICD-10-CM: J45.909  ICD-9-CM: 493.90  8/21/2013 - Present        Diverticular disease of large intestine ICD-10-CM: K57.30  ICD-9-CM: 562.10  8/21/2013 - Present        Essential hypertension ICD-10-CM: I10  ICD-9-CM: 401.9  8/21/2013 - Present        Seasonal allergic rhinitis due to pollen ICD-10-CM: J30.1  ICD-9-CM: 477.0  8/21/2013 - Present        Hyperlipidemia ICD-10-CM: E78.5  ICD-9-CM: 272.4  8/21/2013 - Present        UTI (urinary tract infection) ICD-10-CM: N39.0  ICD-9-CM: 599.0  8/21/2013 - Present        Fecal incontinence ICD-10-CM: R15.9  ICD-9-CM: 787.60  4/17/2013 - Present        History of kidney transplant ICD-10-CM: Z94.0  ICD-9-CM: V42.0  4/30/2012 - Present        CMV (cytomegalovirus) antibody positive ICD-10-CM: R76.8  ICD-9-CM: 795.79  4/30/2012 - Present    Overview Signed 2/13/2017 11:58 AM by Edgardo Clinton A     Overview:   Donor negative             Hematuria ICD-10-CM: R31.9  ICD-9-CM: 599.70  4/30/2012 - Present        Migraine without status migrainosus, not intractable ICD-10-CM: C85.398  ICD-9-CM: 346.90  2/24/2010 - Present        Renal cyst ICD-10-CM: N28.1  ICD-9-CM: 753.10  1/1/2002 - Present    Overview Signed 2/13/2017 12:01 PM by Ajit LLANOS     kidney transplant              RESOLVED: Chronic obstructive pulmonary disease ([facility] 75.) ICD-10-CM: J44.9  ICD-9-CM: 328  8/21/2013 - 1/20/2022        RESOLVED: Gastroesophageal reflux disease ICD-10-CM: K21.9  ICD-9-CM: 530.81  8/21/2013 - 8/3/2021        RESOLVED: Adiposity ICD-10-CM: E66.9  ICD-9-CM: 278.00  2/1/2010 - 8/3/2021           Greater than 35 minutes were spent with the patient on counseling and coordination of care    Signed:   Kapil Rothman MD  9/14/2022  1:34 PM

## 2022-09-14 NOTE — PROCEDURES
Hemodialysis / [phone]    Vitals Pre Post Assessment Pre Post   /68 134/65 LOC Alert & orient X4 Alert & orient X4   HR 76 80 Lungs No sob No sob   Resp 16 16 Cardiac regular regular   Temp 98.4 98.2 Skin Warm and dry Warm and dry   Weight  N/A N/A Edema None noted None noted   Tele status   Pain 0/10 0/10     Orders   Duration: Start: 7044 End: 1009 Total: 2.5 hours   Dialyzer: Dialyzer/Set Up Inspection: Revaclear (09/14/22 0738)   Abhinav Jo Bath: Dialysate K (mEq/L): 2 (09/14/22 0738)   Ca Bath: Dialysate CA (mEq/L): 2.5 (09/14/22 0738)   Na: Dialysate NA (mEq/L): 138 (09/14/22 0738)   Bicarb: Dialysate HCO3 (mEq/L): 35 (09/14/22 0738)   Target Fluid Removal: Goal/Amount of Fluid to Remove (mL): 1200 mL (09/14/22 0738)     Access   Type & Location: Left upper arm fistula    Comments:    patent; cannulated without difficulty 16g needles; no s/s of infection noted;  post treatment hemostasis achieved no excessive bleeding noted                                  Labs   HBsAg (Antigen) / date:   Negative on 8/29/22                                            HBsAb (Antibody) / date: Susceptible on 8/29/22   Source: [facility]/ [facility] care   Obtained/Reviewed  Critical Results Called HGB   Date Value Ref Range Status   09/12/2022 10.4 (L) 12.0 - 16.0 g/dL Final     Comment:     CHANGE NOTED     Potassium   Date Value Ref Range Status   09/12/2022 4.6 3.5 - 5.5 mmol/L Final     Calcium   Date Value Ref Range Status   09/12/2022 8.3 (L) 8.5 - 10.1 mg/dL Final     BUN   Date Value Ref Range Status   09/12/2022 20 (H) 7 - 18 mg/dL Final     Creatinine   Date Value Ref Range Status   09/12/2022 7.06 (H) 0.60 - 1.30 mg/dL Final        Meds Given   Name Dose Route   None ordered                 Adequacy / Fluid    Total Liters Process: 48.5   Net Fluid Removed: 1200 ml      Comments   Time Out Done:   (Time) Yes, 0735   Admitting Diagnosis: Colitis; intractable nausea and vomiting   Consent obtained/signed: Informed Consent Verified:  (chronic dialysis patient) (09/14/22 1756)   Machine / RO # Machine Number: [id number] (09/14/22 2228)   Primary Nurse Rpt Pre: Jordan Yang LPN   Primary Nurse Rpt Post: Jordan Yang LPN   Pt Education: Hemodialysis procedural; s/s of infection noted   Care Plan: Continue dialysis per nephrologist   Pts outpatient clinic: [facility]     Tx Summary   Comments:    per Dr Francisco J Cacramo use previous dialysis orders ; treatment completed uneventful

## 2022-09-14 NOTE — PROGRESS NOTES
0- Assumed care of patient resting in bed vitals obtained. Patient denies any needs at this time, Patient taken to dialysis via bed. 1100- patient back to room via bed from dialysis. 1350- patient discharged home. With daughter. All belongings with patient. Iv and tele removed.

## 2022-09-15 ENCOUNTER — PATIENT OUTREACH (OUTPATIENT)
Dept: CASE MANAGEMENT | Age: 67
End: 2022-09-15

## 2022-09-15 NOTE — ADT AUTH CERT NOTES
Gastroenteritis - Care Day 2 (9/13/2022) by Sergio Beal RN       Review Status Review Entered   Completed 9/14/2022 12:34      Criteria Review      Care Day: 2 Care Date: 9/13/2022 Level of Care: Telemetry    Guideline Day 2    Level Of Care    (X) Floor    9/14/2022 12:31:34 EDT by Rocio Puente      telemetry    Clinical Status    ( ) * Hypotension absent    9/14/2022 12:31:34 EDT by Rocio Puente      VS: T: 98, B/P: 105/47, HR 71, RR 16, SPO2 98 RA    MAP 66    (X) * Bleeding absent    9/14/2022 12:34:49 EDT by Rocio Puente      none noted    (X) * Vomiting absent or managed    9/14/2022 12:34:49 EDT by Rocio Puente      managed    ( ) * Electrolyte levels normal or improved    9/14/2022 12:34:49 EDT by Rocio Puente      no levels obtained today    Activity    (X) * Ambulatory    9/14/2022 12:31:34 EDT by Rocio Puente      activity as tolerated w/ assist ordered    Routes    (X) * Oral hydration    9/14/2022 12:31:34 EDT by Rocio Puente      ADULT DIET Clear Liquid    (X) * Oral medications    9/14/2022 12:34:49 EDT by Rocio Puente      synthroid 75mcg po daily,cozaar 50mg po daily,singulair 10mg po daily,protonix 40mg po daily,renvela 800mg po 3x/day    9/14/2022 12:31:34 EDT by Rocio Puente      duo neb 4x/day,amiodarone 200mg po daily,norvasc 10mg po daily,eliquis 2.5mg po 2x/day,lipitor 10mg po daily, pulmicort neb 2x/day,coreg 25mg po 2x/day,sensipar 30mg po daily,plavix 75mg po daily,bentyl 10mg po daily,Flonase 1spray both nostril daily    (X) * Liquid diet, advance as tolerated    9/14/2022 12:31:34 EDT by Rocio Puente      ADULT DIET Clear Liquid    9/14/2022 12:34:49 EDT by Rocio Puente    Subject: Additional Clinical Information    Other meds      morphine 2mg iv q 4hrs prn x1      NS @ 50ml/hr      zofran 4mg iv q 4hrs prn x2       * Milestone   Additional Notes   9/13/022   HOSPITALIST   Subjective:   Patient is alert and oriented x4.        Continues to have significant nausea along with continued and persistent diarrhea. Also continues to have abdominal discomfort and pain. Tolerating minimally in terms of oral diet. Recently has been evaluated by GI status post colonoscopy on outpatient basis with resultant diagnosis of irritable bowel syndrome and diarrhea component. C. difficile was ordered over the weekend however finally collected yesterday evening and pending result. Also history of recent treatment with antibiotics including ciprofloxacin and Flagyl for acute diverticulitis just 1 month prior. Pending GI consultation as well and discussed with patient extensively. No overnight fever/chills, chest pain. General:    Alert, cooperative, no distress, appears stated age. Lungs:     Clear to auscultation bilaterally. Chest wall:    No tenderness or deformity. Heart:    Regular rate and rhythm, S1, S2 normal, no murmur, click, rub or gallop. Abdomen:     Soft, mild distention with tenderness to palpation diffusely noted. Bowel sounds normal. No masses,  No organomegaly. Extremities:   Extremities normal, atraumatic, no cyanosis or edema. Pulses:   2+ and symmetric all extremities. Skin:   Skin color, texture, turgor normal. No rashes or lesions   Neurologic:   No gross sensory or motor deficits      Assessment/Plan:   Acute colitis:   Recently treated for acute diverticulitis recently and pancreatitis, with ciprofloxacin and Flagyl. Outpatient follow-up with GI status post colonoscopy however patient with persistent diarrhea over the last 3 months noted. CT abdomen showed mild irregularity of the right colonic margins suggestive of possible colitis, extensive colonic diverticula present without evidence of acute diverticulitis or obstruction. C. difficile still needs to be ruled out.    Discontinued antibiotics since she was already recently treated for diverticulitis and she only shows mild colitis on the right side and does not have diverticulitis. She has recurrent chronic abdominal pain and was referred to a surgeon as an outpatient to be considered for a segmental resection of her left colon but per patient, there are no plans for surgical intervention at this time. Pending mesenteric duplex to rule out mesenteric ischemia since she does have history of atrial fibrillation and is at high risk for arterial embolism. Hyperkalemia   Improved after hemodialysis on 9/11/2022. Hypertension:   Continue Coreg, Norvasc, Cozaar   Monitor blood pressure closely       Hyperlipidemia:   Continue statin       Atrial fibrillation:   Continue Coreg, amiodarone and Eliquis       ESRD on dialysis:   Consulted Dr. Steve Gonzalez for hemodialysis   Hemodialysis days are Monday and Friday   Status post hemodialysis on 9/11/2022. Hypothyroidism:   Continue Synthroid       GERD:   Continue Dexilant       DVT prophylaxis   Currently on Eliquis. OT   ASSESSMENT :   Based on the objective data described below, the patient presents with declines in basic ADLs and mobility. Patient will benefit from skilled intervention to address the above impairments.    Patient's rehabilitation potential is considered to be Good   Factors which may influence rehabilitation potential include:                 None noted                Mental ability/status   X             Medical condition   X             Home/family situation and support systems                Safety awareness                Pain tolerance/management                Other:        PLAN :   Recommendations and Planned Interventions:    X               Self Care Training                  X      Therapeutic Activities   X               Functional Mobility Training         Cognitive Retraining   X               Therapeutic Exercises                 Endurance Activities   X               Balance Training                          Neuromuscular Re-Education Visual/Perceptual Training           Home Safety Training                  Patient Education                         Family Training/Education                  Other (comment):       Frequency/Duration: Patient will be followed by occupational therapy 4 times a week to address goals. Discharge Recommendations: To Be Determined   Further Equipment Recommendations for Discharge: N/A      GASTROENTEROLOGY   Assessment:   Prior acute colitis of unknown etiology. This appears to be resolved. Recent repeat colonoscopy done 13 days ago showed no distinct ischemia or diverticulitis on biopsies. Stool studies were negative and flexible sigmoidoscopy showed no abnormality in the left colon. This could have been transient ischemia, due to microvascular disease or embolus since she does have a history of atrial fibrillation/flutter. A viral illness, or other etiology are also possibilities. It appears to have resolved. She did have elevated sedimentation rate and CRP suggesting acute phase reactants. Again inflammatory bowel disease possibility, but less likely given prior negative colonoscopies and extensive diverticulosis without diverticulitis recently. 9/13/2022: Once again, I feel the patient likely had transient ischemia with some thickening of her colon on the right. She has had a colonoscopy with biopsies of this area within the last 6 months. This does not need to be repeated. I feel with the fluid shifts from dialysis and her heart disease, she likely has low-grade ischemia. Abdominal pain. Her records are extensive and I discussed in detail today what I think is occurring with this particular patient. Her daughter was also present. She has had 5 CT scans in the last 3 months. The first CT scan done during 3/2022  admission showed left-sided diverticulitis. A second done 2 weeks later showed resolving diverticulitis.   1 done in January, another in April, and a third done earlier this month did not show diverticulitis. It just showed diverticulosis. She did however have acute colitis on the CT scan as recently as 5/9/2022. I think she has 2 separate things occurring. The first is she has diverticulitis at times. There are other times she gets left-sided pain and I think she has SCAD (segmental colitis associated with diverticulosis) at times as well but not with diverticulitis. The the second, is acute low-grade colitis likely related to ischemia, which usually is not in the left colon and most recently was seen in the transverse and ascending colon. This however is mild as confirmed on her recent colonoscopy with biopsies. I feel she gets low-grade ischemia due to vascular disease, hypertension, and possibly even due to an embolus or with thrombus related to her atrial fibrillation. All these factors can cause low-grade ischemia. Unfortunately treatment of her blood pressure making sure she is not dehydrated as best as can be done even with fluid shifts related to dialysis is the treatment for the latter. 9/13/2022: As before, I feel she likely has low-grade ischemia causing the right-sided findings on CT scan and the mild biopsies on colonoscopy. The left-sided abdominal pain I feel is due to SCAD (segmental colitis associated with diverticulosis) at times but not acute diverticulitis. She may also have low-grade ischemia secondary to ischemia. Irritable bowel syndrome is a possibility but I feel given the associated symptoms of nausea and vomiting and the above that scad and low-grade ischemia more likely. Short of medical therapy which she is not really responded to, and the only treatment of this ultimately would be surgery with sigmoid colectomy. I also agree with the surgeon that keeping her bowel movements regular is very important and may alleviate her getting the pain.   Constipation appears to be a main issue and she usually only gets diarrhea when she has about of the abdominal pain, nausea, and vomiting with diarrhea. Colon cancer screening. She just recently had colonoscopy done 5/13/2022 and does not need a repeat study. Chronic hemodialysis with chronic renal failure. She has dialysis every Monday, Wednesday, Friday. History of atrial fibrillation/flutter. She is on amiodarone. Hyperlipidemia. She is on oral medication for this problem. Diverticulosis. This is apparently extensive. Hypothyroidism. She is on thyroid replacement. Failed kidney transplant. She is on hemodialysis. Anticoagulation use. The patient is on Plavix daily. Went to her chart she is also on Eliquis 2.5 mg daily. Esophageal reflux disease. Patient is on both sucralfate. Nausea and vomiting. I feel again this is due to pain, fluid shifts from hemodialysis, dehydration, medications, and her overall various medical problems. Given her dialysis and fluid shifts, this may be a recurrent problem. Plan:       Recommend colon cancer screening 5/2032. Not need another colonoscopy at this time. Observation and reassurance. No further GI evaluation is indicated. I told the patient and her daughter for now, as she has diverticulitis which proved to be on her left side as she does not have diverticulosis on the right side, that this can be treated with antibiotics. However if she has persistent or recurrent symptoms and has SCAD, which can mimic symptoms of diverticulitis, she may need to have segmental resection of her left colon. 9/13/2022: Unfortunately, from a medical GI standpoint, there is little for this patient. I would suggest a trial of dicyclomine 10 mg p.o. up to 4 times a day. The choice is hers but I would recommend seeing the surgeon again about having sigmoid colectomy which I feel would be beneficial, as above. Other than surgery, I unfortunately feel we have nothing further to offer her.        Regarding her transverse and right colon, this appears to at times get acute colitis, probably related to ischemia. I would not recommend having any resection here unless this was so severe she was developing gangrene or had severe acute colitis. 9/13/2022: The above recommendations unchanged from the office. Again reemphasized today that this is very complex and difficult problem and I think at this point we are kind of stuck medically. For her constipation, both in the hospital and as an outpatient, I suggested that she start miralax 1-3 glasses a day. Adjust to effect. Patient was told they will likely worsen before they improve and it may be a month or more to be effective. Also discussed the longer they have been constipated, the longer it will take to improve this problem and the associated gas, bloating, and abdominal pain. I agree this is absolutely important as far as decreasing the risk of having abdominal pain. Use antiemetics such as Zofran, hydroxyzine, other on an as-needed basis both now and as an outpatient for nausea and vomiting. Further recommendations pending the patient's clinical course, if needed in the future. PT   ASSESSMENT :   Pt is poorly motivated. Agrees to participate however noted decreased effort. Pt comes to sitting without difficulty reported she feels dizzy. Pt stands with SPC noted Pt is unsteady and with LOB back onto bed. BP was checked in sitting and standing and was WNL. After coming to stand a second time Pt stated I cant do anymore. She declined exercise and further treatment. See below for treatment details. Progression toward goals: No progress this visit . Poorly motivated.     X      Improving appropriately and progressing toward goals         Improving slowly and progressing toward goals         Not making progress toward goals and plan of care will be adjusted           PLAN :   Patient continues to benefit from skilled intervention to address the above impairments. Continue treatment per established plan of care. Frequency/Duration: Patient will be followed by physical therapy 1-2 times per day/4-7 days per week to address goals. Discharge Recommendations: Home as prior   Further Equipment Recommendations for Discharge: straight cane              PA RECOMMENDATION by Ryan Bunch RN       Review Status Review Entered   In Primary 2022 07:12      Criteria Review   slr keep in    Name: Mehrdad Baptiste   :    Room: [facility]/01  MRN: [id number]  Insurance: [employer]     Date of admission: 9/10/2022    Current status: INPATIENT    We recommend that patient's status is APPROPRIATE     Rationale: .pt is having severe pain and requires iv pain meds and iv emetics. Clinical summary 74 y.o. female hospitalized for severe abd pain requiring further inpt care with iv fluids, ivab , iv zofran x 8 iv morphine 4mg . Iv morphine 2mg x 8 gi consults  Vitals Continues to have significant nausea along with continued and persistent diarrhea  Labs and Imaging acute colitis likely had transient ischemia with some thickening of her colon on the right.  Pt is being followed by gi, surgery rec  Status decision based on clinical judgment and  Commercial Utilization criteria, e.g., [employer], [employer]     This chart was reviewed at 5:11 PM 2022    Signed By: Parviz Garcia MD   2022

## 2022-09-15 NOTE — PROGRESS NOTES
Care Transitions Initial Call    Call within 2 business days of discharge: Yes     Patient: Merline Medina Patient : 1955 MRN: [id number]    Last Discharge  [address]       Date Complaint Diagnosis Description Type Department Provider    9/10/22 Vomiting Noninfectious gastroenteritis, unspecified type . .. ED to Hosp-Admission (Discharged) (ADMIT) F2S Marie Ashby MD; Agustin Paulson. .. Was this an external facility discharge? No Discharge Facility: N/A    Challenges to be reviewed by the provider   Additional needs identified to be addressed with provider: no  none         Method of communication with provider : chart routing    Discussed COVID-19 related testing which was not done at this time. Test results were not done. Patient informed of results, if available? N/A     Advance Care Planning:   Does patient have an Advance Directive: not on file. Inpatient Readmission Risk score: Unplanned Readmit Risk Score: 27.8    Was this a readmission? Yes; [facility] 22-22 for Diverticulitis     Care Transition Nurse (CTN) contacted the patient by telephone to perform post hospital discharge assessment. No answer. Left HIPPA compliant message. Name, role and contact information provided. Requested return call. No PHI on file. Will attempt to contact at a later time.

## 2022-09-16 ENCOUNTER — PATIENT OUTREACH (OUTPATIENT)
Dept: CASE MANAGEMENT | Age: 67
End: 2022-09-16

## 2022-09-16 NOTE — PROGRESS NOTES
Care Transitions Initial Call    Call within 2 business days of discharge: Yes     Patient: Yoan Nesbitt Patient : 1955 MRN: [id number]    Last Discharge [address]       Date Complaint Diagnosis Description Type Department Provider    9/10/22 Vomiting Noninfectious gastroenteritis, unspecified type . .. ED to [facility]Discharged) ([facility] MD; Kaylee Obrien. .. Was this an external facility discharge? No Discharge Facility: N/A    Challenges to be reviewed by the provider   Additional needs identified to be addressed with provider: no  none         Method of communication with provider : none    Discussed COVID-19 related testing which was not done at this time. Test results were not done. Patient informed of results, if available? N/A     Advance Care Planning:   Does patient have an Advance Directive: not on file. Inpatient Readmission Risk score: Unplanned Readmit Risk Score: 27.8    Was this a readmission? yes   Patient stated reason for the admission: Yes; [facility] 22-22 for Diverticulitis       Care Transition Nurse (CTN) contacted the patient by telephone to perform post hospital discharge assessment. Second attempt. No answer. Voicemail full. Called mobile number. Call answered by Seema Stark, daughter. Daughter voiced patient is at home but not feeling well today. Patient does not have a PHI on file. CTN verified patient's home number. CTN provided contact info to daughter and requested patient to return call. Unable to reach patient x 2 attempts. Episode closed.

## 2022-09-22 ENCOUNTER — OFFICE VISIT (OUTPATIENT)
Dept: FAMILY MEDICINE CLINIC | Age: 67
End: 2022-09-22
Payer: MEDICARE

## 2022-09-22 VITALS
WEIGHT: 139.9 LBS | RESPIRATION RATE: 18 BRPM | TEMPERATURE: 97.5 F | OXYGEN SATURATION: 100 % | DIASTOLIC BLOOD PRESSURE: 70 MMHG | HEART RATE: 80 BPM | SYSTOLIC BLOOD PRESSURE: 130 MMHG | BODY MASS INDEX: 25.75 KG/M2 | HEIGHT: 62 IN

## 2022-09-22 DIAGNOSIS — K52.9 ACUTE COLITIS: ICD-10-CM

## 2022-09-22 DIAGNOSIS — R30.0 DYSURIA: ICD-10-CM

## 2022-09-22 DIAGNOSIS — Z09 HOSPITAL DISCHARGE FOLLOW-UP: Primary | ICD-10-CM

## 2022-09-22 DIAGNOSIS — Z87.81 H/O FRACTURE OF RIB: ICD-10-CM

## 2022-09-22 LAB
BILIRUB UR QL STRIP: NEGATIVE
GLUCOSE UR-MCNC: NEGATIVE MG/DL
KETONES P FAST UR STRIP-MCNC: NEGATIVE MG/DL
PH UR STRIP: 7 [PH] (ref 4.6–8)
PROT UR QL STRIP: NORMAL
SP GR UR STRIP: 1.02 (ref 1–1.03)
UA UROBILINOGEN AMB POC: NORMAL (ref 0.2–1)
URINALYSIS CLARITY POC: CLEAR
URINALYSIS COLOR POC: YELLOW
URINE BLOOD POC: NORMAL
URINE LEUKOCYTES POC: NEGATIVE
URINE NITRITES POC: NEGATIVE

## 2022-09-22 PROCEDURE — 99495 TRANSJ CARE MGMT MOD F2F 14D: CPT | Performed by: STUDENT IN AN ORGANIZED HEALTH CARE EDUCATION/TRAINING PROGRAM

## 2022-09-22 PROCEDURE — 81001 URINALYSIS AUTO W/SCOPE: CPT | Performed by: STUDENT IN AN ORGANIZED HEALTH CARE EDUCATION/TRAINING PROGRAM

## 2022-09-22 RX ORDER — LIDOCAINE 50 MG/G
PATCH TOPICAL
Qty: 30 EACH | Refills: 3 | Status: ON HOLD | OUTPATIENT
Start: 2022-09-22 | End: 2022-10-27

## 2022-09-22 RX ORDER — CEPHALEXIN 500 MG/1
500 CAPSULE ORAL 2 TIMES DAILY
Qty: 10 CAPSULE | Refills: 0 | Status: SHIPPED | OUTPATIENT
Start: 2022-09-22 | End: 2022-09-27
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
## 2022-09-22 NOTE — PROGRESS NOTES
Giselle Weller presents today for   Chief Complaint   Patient presents with    Follow-up     Follow up        Is someone accompanying this pt? no    Is the patient using any DME equipment during OV? no    Depression Screening:  3 most recent PHQ Screens 9/22/2022   Little interest or pleasure in doing things Not at all   Feeling down, depressed, irritable, or hopeless Not at all   Total Score PHQ 2 0   Trouble falling or staying asleep, or sleeping too much -   Feeling tired or having little energy -   Poor appetite, weight loss, or overeating -   Feeling bad about yourself - or that you are a failure or have let yourself or your family down -   Trouble concentrating on things such as school, work, reading, or watching TV -   Moving or speaking so slowly that other people could have noticed; or the opposite being so fidgety that others notice -   Thoughts of being better off dead, or hurting yourself in some way -   PHQ 9 Score -       Learning Assessment:  Learning Assessment 8/17/2021   PRIMARY LEARNER Patient   HIGHEST LEVEL OF EDUCATION - PRIMARY LEARNER  SOME COLLEGE   BARRIERS PRIMARY LEARNER NONE   PRIMARY LANGUAGE ENGLISH   LEARNER PREFERENCE PRIMARY DEMONSTRATION   LEARNING SPECIAL TOPICS NO   ANSWERED BY SELF   RELATIONSHIP SELF       Fall Risk  Fall Risk Assessment, last 12 mths 9/22/2022   Able to walk? Yes   Fall in past 12 months? 0   Do you feel unsteady?  0   Are you worried about falling 0   Number of falls in past 12 months -   Fall with injury? -       ADL  ADL Assessment 9/22/2022   Feeding yourself No Help Needed   Getting from bed to chair No Help Needed   Getting dressed Help Needed   Bathing or showering No Help Needed   Walk across the room (includes cane/walker) Help Needed   Using the telphone No Help Needed   Taking your medications No Help Needed   Preparing meals Help Needed   Managing money (expenses/bills) Help Needed   Moderately strenuous housework (laundry) Help Needed   Shopping for personal items (toiletries/medicines) Help Needed   Shopping for groceries Help Needed   Driving Help Needed   Climbing a flight of stairs Help Needed   Getting to places beyond walking distances Help Needed       Health Maintenance reviewed and discussed and ordered per Provider. Health Maintenance Due   Topic Date Due    Hepatitis C Screening  Never done    Shingrix Vaccine Age 49> (1 of 2) Never done    DTaP/Tdap/Td series (1 - Tdap) Never done    COVID-19 Vaccine (4 - Booster for Pfizer series) 06/15/2022    Bone Densitometry (Dexa) Screening  Never done    Flu Vaccine (1) 08/01/2022   . Coordination of Care:  1. \"Have you been to the ER, urgent care clinic since your last visit? Hospitalized since your last visit? \" Yes Where: [facility] ER    2. \"Have you seen or consulted any other health care providers outside of the [address] since your last visit? \" No     3. For patients aged 39-70: Has the patient had a colonoscopy? Yes - no Care Gap present     If the patient is female:    4. For patients aged 41-77: Has the patient had a mammogram within the past 2 years? Yes - no Care Gap present    5. For patients aged 21-65: Has the patient had a pap smear?  Yes - no Care Gap present

## 2022-09-23 ENCOUNTER — HOSPITAL ENCOUNTER (OUTPATIENT)
Dept: LAB | Age: 67
Discharge: HOME | End: 2022-09-23

## 2022-09-23 ENCOUNTER — TELEPHONE (OUTPATIENT)
Dept: FAMILY MEDICINE CLINIC | Age: 67
End: 2022-09-23

## 2022-09-23 RX ORDER — DICLOFENAC SODIUM 10 MG/G
GEL TOPICAL 4 TIMES DAILY
Qty: 200 G | Refills: 1 | Status: SHIPPED | OUTPATIENT
Start: 2022-09-23

## 2022-09-23 NOTE — TELEPHONE ENCOUNTER
Insurance would not cover lidocaine patches. Discussed with patient while she was in the office about diclofenac gel. On her medication list she had a similar gel/cream.  Patient said that she does not remember taking that medication. Discussed patient's allergy record with her specifically to NSAIDs. Symptoms were not a true allergy to the medication. Discussed that this medication could cause rash or itching, but given that it is topical form is low risk to cause anaphylactic reaction if she does have a true allergy. Suspect that the allergy is very low risk of actual allergy. Patient willing to accept the risks at this time. If she does develop allergy symptoms such as rash itching burning stinging shortness of breath symptoms recommend that she stop taking the medication and go to the ER. Patient understood and agreed with plan.

## 2022-09-23 NOTE — ED PROVIDER NOTES
EMERGENCY DEPARTMENT HISTORY AND PHYSICAL EXAM      Date: 9/6/2022  Patient Name: Lenny Moffett    History of Presenting Illness     Chief Complaint   Patient presents with    Abdominal Pain    Nausea    Vomiting       History Provided By: Patient    HPI: Lenny Moffett, 74 y.o. female with multiple medical problems including COPD and asthma, hyperlipidemia, hypertension, hypercholesterolemia end-stage renal disease on dialysis twice/week, M/W, with failed kidney transplant, migraines, A. fib and is anticoagulated on Eliquis, hypothyroidism, migraine headaches, peptic ulcer disease and a history of diverticulosis, history of polypharmacy presents to ED complaining of abdomen pain over the last several weeks days. She reports pain mostly in the left and lower quadrants, pain similar to previous diverticulitis. Patient describes several episodes in the past.  Pain is sharp and constant with episodes of exacerbation. Pain is associated with some nausea, patient vomited once this morning. She denies fever, chills, diarrhea. There are no other complaints, changes, or physical findings at this time. PCP: Mackenzie Noel MD    No current facility-administered medications on file prior to encounter. Current Outpatient Medications on File Prior to Encounter   Medication Sig Dispense Refill    cinacalcet (SENSIPAR) 30 mg tablet Take 30 mg by mouth daily. clopidogreL (PLAVIX) 75 mg tab Take 75 mg by mouth daily. losartan (COZAAR) 50 mg tablet Take 1 Tablet by mouth daily. 90 Tablet 1    cyclobenzaprine (FLEXERIL) 5 mg tablet Take 1 Tablet by mouth three (3) times daily as needed for Muscle Spasm(s). 180 Tablet 0    amiodarone (CORDARONE) 200 mg tablet TAKE 1 TABLET BY MOUTH EVERY DAY 90 Tablet 1    amLODIPine (NORVASC) 10 mg tablet Take 1 tablet by mouth once daily 90 Tablet 1    carvediloL (COREG) 25 mg tablet Take 1 Tablet by mouth two (2) times daily (with meals).  180 Tablet 1    dexlansoprazole (Dexilant) 60 mg CpDB capsule (delayed release) Take 1 Capsule by mouth in the morning. 90 Capsule 0    fluticasone propionate (FLONASE) 50 mcg/actuation nasal spray Take 1 [place] by Both Nostrils route in the morning. 1 Each 1    levothyroxine (SYNTHROID) 75 mcg tablet Take 1 Tablet by mouth Daily (before breakfast). 90 Tablet 1    simvastatin (ZOCOR) 20 mg tablet TAKE 1 TABLET BY MOUTH DAILY AS DIRECTED. 90 Tablet 1    fluticasone-umeclidinium-vilanterol (Trelegy Ellipta) 100-62.5-25 mcg inhaler Take 1 Puff by inhalation in the morning. 60 Each 1    valGANciclovir (VALCYTE) 450 mg tablet Take 2 Tablets by mouth in the morning. 180 Tablet 1    dicyclomine (BENTYL) 10 mg capsule Take 1 Capsule by mouth in the morning. 90 Capsule 1    montelukast (SINGULAIR) 10 mg tablet Take 1 Tablet by mouth in the morning. 90 Tablet 1    hyoscyamine SL (LEVSIN/SL) 0.125 mg SL tablet 1 Tablet by SubLINGual route every four (4) hours as needed (irritable bowel). 30 Tablet 2    sucralfate (CARAFATE) 1 gram tablet TAKE 1 TABLET BY MOUTH FOUR TIMES DAILY 360 Tablet 1    apixaban (ELIQUIS) 2.5 mg tablet Take 1 Tablet by mouth two (2) times a day. 180 Tablet 1    zolpidem (AMBIEN) 5 mg tablet Take 1 Tablet by mouth nightly as needed for Sleep. Max Daily Amount: 5 mg. 30 Tablet 0    predniSONE (DELTASONE) 5 mg tablet Take 1 Tablet by mouth daily. albuterol (PROVENTIL VENTOLIN) 2.5 mg /3 mL (0.083 %) nebu USE 1 VIAL VIA NEBULIZER THREE TIMES DAILY 90 mL 3    calcitRIOL (ROCALTROL) 0.5 mcg capsule Take 0.5 mcg by mouth See Admin Instructions. Take on non dialysis days (Tues, Wed, Thur, Sat, Sun)  Dialysis is on Monday and Friday      RenaPlex-D 800 mcg-12.5 mg -2,000 unit tab Take 1 Tablet by mouth daily. sevelamer carbonate (RENVELA) 800 mg tab tab Take 800 mg by mouth three (3) times daily.       aluminum & magnesium hydroxide-simethicone (Maalox Maximum Strength) 400-400-40 mg/5 mL suspension Take 10 mL by mouth every six (6) hours as needed for Indigestion. 250 mL 0    ESTRACE 0.01 % (0.1 mg/gram) vaginal cream INSERT 2 GRAMS INTO VAGINA EVERY MONDAY AND THURSDAY 42.5 g 5    cranberry extract 425 mg cap Take 425 mg by mouth daily.          Past History     Past Medical History:  Past Medical History:   Diagnosis Date    JEFF (acute kidney injury) ([facility] 05/09/2019    Anemia NEC     Anxiety     Arrhythmia     Asthma     Asthma     Burning with urination     frequent uti    Cholecystitis 01/12/2019    Chronic kidney disease     dialysis    CMV (cytomegalovirus) antibody positive     COPD (chronic obstructive pulmonary disease) ([facility]     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    Dialysis patient [facility])     M/W/F    Diverticular disease of colon 08/21/2013    Dyspepsia and other specified disorders of function of stomach     stomach ulcer    Elevated troponin 10/21/2019    Essential hypertension     GERD (gastroesophageal reflux disease)     History of chemotherapy     History of renal transplant 08/21/2013    (LD 2/12/2002)    HLD (hyperlipidemia)     Hypercholesteremia     Hypertension     Hypothyroidism 03/23/2022    Kidney transplant rejection     Menopause     Migraine     Obesity     Pyelonephritis 07/13/2020    Renal cyst 2002    kidney transplant     Spontaneous bacterial peritonitis ([facility] 01/16/2019    Ulcer        Past Surgical History:  Past Surgical History:   Procedure Laterality Date    COLONOSCOPY      Dr Trent Ask  5yrs ago    ENDOSCOPY VISIT-OUTPATIENT      Dr Twan Varner  5 yrs ago    ENDOSCOPY, COLON, DIAGNOSTIC      with Dr. Isabel Fisher CHOLECYSTECTOMY  02/2021    HX COLONOSCOPY  05/13/2022    HX GI      ulcer    HX HEENT      sinus surg    HX OTHER SURGICAL  02/21/2022    SIGMOIDOSCOPY, FLEXIBLE;  Surgeon: Koki Coleman MD    HX RENAL TRANSPLANT      HX TRANSPLANT      kidney transplant 2002    VASCULAR SURGERY PROCEDURE UNLIST      shunt in prep for dialysis       Family History:  Family History   Problem Relation Age of Onset Diabetes Mother     Cervical Cancer Mother     Arthritis-rheumatoid Mother     Hypertension Father     Diabetes Father     Thyroid Disease Sister     Colon Polyps Brother        Social History:  Social History     Tobacco Use    Smoking status: Never    Smokeless tobacco: Never   Vaping Use    Vaping Use: Never used   Substance Use Topics    Alcohol use: No    Drug use: No       Allergies: Allergies   Allergen Reactions    Aspirin Rash and Unknown (comments)    Bactrim [Sulfamethoxazole-Trimethoprim] Rash and Unknown (comments)    Bromfenac Rash and Unknown (comments)    Cefepime Other (comments)     Neurological reaction    Ceftriaxone Rash    Copper Rash    Ibuprofen Rash and Unknown (comments)    Ketorolac Tromethamine Rash and Unknown (comments)    Relafen [Nabumetone] Rash and Unknown (comments)    Rifampin Rash and Unknown (comments)    Vancomycin Rash and Unknown (comments)     Pt reports causes itching, takes benadryl prior to use. Pt denies anaphylaxis. Requires benadryl with each vancomycin dose       Review of Systems   Review of Systems   Constitutional:  Negative for chills and fever. HENT:  Negative for congestion and sore throat. Respiratory:  Negative for cough and shortness of breath. Cardiovascular:  Negative for chest pain. Gastrointestinal:  Positive for abdominal pain, nausea and vomiting. Negative for abdominal distention. Genitourinary:  Negative for difficulty urinating, dysuria, flank pain, frequency, hematuria, urgency, vaginal bleeding and vaginal discharge. Musculoskeletal:  Negative for arthralgias and joint swelling. Skin:  Negative for rash and wound. Neurological:  Negative for dizziness, weakness, light-headedness and headaches. Hematological:  Negative for adenopathy. Physical Exam   Physical Exam  Vitals and nursing note reviewed. Constitutional:       Appearance: She is well-developed. Comments: Patient appears in no acute distress.    HENT: Head: Normocephalic and atraumatic. Eyes:      Pupils: Pupils are equal, round, and reactive to light. Cardiovascular:      Rate and Rhythm: Normal rate and regular rhythm. Heart sounds: Normal heart sounds, S1 normal and S2 normal.   Pulmonary:      Effort: No respiratory distress. Breath sounds: Normal breath sounds. No wheezing or rales. Chest:      Chest wall: No tenderness. Abdominal:      General: There is no distension. Palpations: Abdomen is soft. There is no mass. Tenderness: There is abdominal tenderness. There is no guarding. Comments: Abdomen is soft, very mild tenderness left lower quadrant. No guarding no rebound. Musculoskeletal:         General: No tenderness. Normal range of motion. Cervical back: Neck supple. Skin:     Findings: No rash. Neurological:      Mental Status: She is alert and oriented to person, place, and time. Cranial Nerves: No cranial nerve deficit. Psychiatric:         Behavior: Behavior normal.         Thought Content: Thought content normal.       Lab and Diagnostic Study Results   Labs -   No results found for this or any previous visit (from the past 12 hour(s)). Radiologic Studies -   @lastxrresult@  CT Results  (Last 48 hours)      None          CXR Results  (Last 48 hours)      None            Medical Decision Making and ED Course   Differential Diagnosis & Medical Decision Making Provider Note:       - I am the first provider for this patient. I reviewed the vital signs, available nursing notes, past medical history, past surgical history, family history and social history. The patients presenting problems have been discussed, and they are in agreement with the care plan formulated and outlined with them. I have encouraged them to ask questions as they arise throughout their visit. Vital Signs-Reviewed the patient's vital signs. No data found. ED Course:   Patient presents with abdomen pain for about a month. She reports known diverticulitis. She reports pain is similar. Evaluation in ED reveals mild acute diverticulitis. Patient has no fever or leukocytosis. She was able to tolerate p.o. in ED and the pain resolved with small dose fentanyl. She is anticoagulated and polypharmacy. She was offered admission but she prefers going home, she states she has done well in the past with antibiotics. She is therefore being discharged home on Augmentin and Flagyl. She was given precautions for returning to ED particularly bleeding, or inability to keep medications or worsening of pain. Procedures     Disposition   Disposition: Condition stable and improved  DC- Adult Discharges: All of the diagnostic tests were reviewed and questions answered. Diagnosis, care plan and treatment options were discussed. The patient understands the instructions and will follow up as directed. The patients results have been reviewed with them. They have been counseled regarding their diagnosis. The patient verbally convey understanding and agreement of the signs, symptoms, diagnosis, treatment and prognosis and additionally agrees to follow up as recommended with their PCP in 24 - 48 hours. They also agree with the care-plan and convey that all of their questions have been answered. I have also put together some discharge instructions for them that include: 1) educational information regarding their diagnosis, 2) how to care for their diagnosis at home, as well a 3) list of reasons why they would want to return to the ED prior to their follow-up appointment, should their condition change. DISCHARGE PLAN:  1. Cannot display discharge medications since this patient is not currently admitted.     2.   Follow-up Information       Follow up With Specialties Details Why Contact Info    Kevin Recinos, [address] ([address])  [address] [facility] [id number] [facility]      Harmony Pierce MD Gastroenterology In 2 days  [address]  [phone]            3. Return to ED if worse   4. Discharge Medication List as of 9/6/2022  7:58 PM        START taking these medications    Details   amoxicillin-clavulanate (Augmentin) 875-125 mg per tablet Take 1 Tablet by mouth two (2) times a day., Normal, Disp-20 Tablet, R-0      metroNIDAZOLE (FlagyL) 500 mg tablet Take 1 Tablet by mouth three (3) times daily for 10 days. , Normal, Disp-30 Tablet, R-0      HYDROcodone-acetaminophen (Norco) 5-325 mg per tablet Take 1 Tablet by mouth every six (6) hours as needed for Pain for up to 3 days. Max Daily Amount: 4 Tablets., Normal, Disp-10 Tablet, R-0      polyethylene glycol (Miralax) 17 gram packet Take 1 Packet by mouth daily. , Normal, Disp-10 Each, R-0           CONTINUE these medications which have NOT CHANGED    Details   ciprofloxacin HCl (CIPRO) 500 mg tablet Take 1 Tablet by mouth daily for 7 days. , Normal, Disp-7 Tablet, R-0      cinacalcet (SENSIPAR) 30 mg tablet Take 30 mg by mouth daily. , Historical Med      clopidogreL (PLAVIX) 75 mg tab Take 75 mg by mouth daily. , Historical Med      losartan (COZAAR) 50 mg tablet Take 1 Tablet by mouth daily. , Normal, Disp-90 Tablet, R-1      cyclobenzaprine (FLEXERIL) 5 mg tablet Take 1 Tablet by mouth three (3) times daily as needed for Muscle Spasm(s). , Normal, Disp-180 Tablet, R-0      amiodarone (CORDARONE) 200 mg tablet TAKE 1 TABLET BY MOUTH EVERY DAY, Normal, Disp-90 Tablet, R-1      amLODIPine (NORVASC) 10 mg tablet Take 1 tablet by mouth once daily, Normal, Disp-90 Tablet, R-1      carvediloL (COREG) 25 mg tablet Take 1 Tablet by mouth two (2) times daily (with meals). , Normal, Disp-180 Tablet, R-1      dexlansoprazole (Dexilant) 60 mg CpDB capsule (delayed release) Take 1 Capsule by mouth in the morning., Normal, Disp-90 Capsule, R-0      fluticasone propionate (FLONASE) 50 mcg/actuation nasal spray Take 1 [place] by Both Nostrils route in the morning., Normal, Disp-1 Each, R-1      levothyroxine (SYNTHROID) 75 mcg tablet Take 1 Tablet by mouth Daily (before breakfast). , Normal, Disp-90 Tablet, R-1      simvastatin (ZOCOR) 20 mg tablet TAKE 1 TABLET BY MOUTH DAILY AS DIRECTED., Normal, Disp-90 Tablet, R-1      fluticasone-umeclidinium-vilanterol (Trelegy Ellipta) 100-62.5-25 mcg inhaler Take 1 Puff by inhalation in the morning., Normal, Disp-60 Each, R-1      valGANciclovir (VALCYTE) 450 mg tablet Take 2 Tablets by mouth in the morning., Normal, Disp-180 Tablet, R-1      dicyclomine (BENTYL) 10 mg capsule Take 1 Capsule by mouth in the morning., Normal, Disp-90 Capsule, R-1      montelukast (SINGULAIR) 10 mg tablet Take 1 Tablet by mouth in the morning., Normal, Disp-90 Tablet, R-1      hyoscyamine SL (LEVSIN/SL) 0.125 mg SL tablet 1 Tablet by SubLINGual route every four (4) hours as needed (irritable bowel). , Normal, Disp-30 Tablet, R-2      sucralfate (CARAFATE) 1 gram tablet TAKE 1 TABLET BY MOUTH FOUR TIMES DAILY, Normal, Disp-360 Tablet, R-1      apixaban (ELIQUIS) 2.5 mg tablet Take 1 Tablet by mouth two (2) times a day., Normal, Disp-180 Tablet, R-1      meclizine (ANTIVERT) 25 mg tablet Take 1 Tablet by mouth three (3) times daily as needed for Dizziness., Normal, Disp-90 Tablet, R-1      lidocaine-prilocaine (EMLA) topical cream APPLY SMALL AMOUNT TO ACCESS SITE (AVF) 1 TO 2 HOURS BEFORE DIALYSIS. COVER WITH OCCLUSIVE DRESSING (SARAN WRAP), Historical Med      zolpidem (AMBIEN) 5 mg tablet Take 1 Tablet by mouth nightly as needed for Sleep. Max Daily Amount: 5 mg., Normal, Disp-30 Tablet, R-0      predniSONE (DELTASONE) 5 mg tablet Take 1 Tablet by mouth daily. , Historical Med      ondansetron (ZOFRAN ODT) 4 mg disintegrating tablet Take 1 Tablet by mouth every eight (8) hours as needed for Nausea or Nausea or Vomiting., Normal, Disp-90 Tablet, R-1      albuterol (PROVENTIL VENTOLIN) 2.5 mg /3 mL (0.083 %) nebu USE 1 VIAL VIA NEBULIZER THREE TIMES DAILY, Normal, Disp-90 mL, R-3      calcitRIOL (ROCALTROL) 0.5 mcg capsule Take 0.5 mcg by mouth as directed. Take on non dialysis days, Historical Med      RenaPlex-D 800 mcg-12.5 mg -2,000 unit tab Take 1 Tablet by mouth daily. , Historical Med, RITU      sevelamer carbonate (RENVELA) 800 mg tab tab Take 800 mg by mouth three (3) times daily. , Historical Med      aluminum & magnesium hydroxide-simethicone (Maalox Maximum Strength) 400-400-40 mg/5 mL suspension Take 10 mL by mouth every six (6) hours as needed for Indigestion. , Normal, Disp-250 mL, R-0      ESTRACE 0.01 % (0.1 mg/gram) vaginal cream INSERT 2 GRAMS INTO VAGINA EVERY MONDAY AND THURSDAY, Normal, Disp-42.5 g, R-5      cranberry extract 425 mg cap Take 425 mg by mouth daily. , Historical Med            Remove if admitted/transferred    Diagnosis/Clinical Impression     Clinical Impression:   1. Diverticulitis        Attestations: Kelly Pete MD, am the primary clinician of record. Please note that this dictation was completed with [employer], the [employer] voice recognition software. Quite often unanticipated grammatical, syntax, homophones, and other interpretive errors are inadvertently transcribed by the computer software. Please disregard these errors. Please excuse any errors that have escaped final proofreading. Thank you.

## 2022-09-23 NOTE — TELEPHONE ENCOUNTER
Patient called stating she needs a pre authorization for some patches for her side.      Call back info  [phone]

## 2022-09-28 DIAGNOSIS — K21.00 GASTROESOPHAGEAL REFLUX DISEASE WITH ESOPHAGITIS WITHOUT HEMORRHAGE: ICD-10-CM

## 2022-09-28 LAB — BACTERIA UR CULT: NORMAL

## 2022-10-03 RX ORDER — DEXLANSOPRAZOLE 60 MG/1
CAPSULE, DELAYED RELEASE ORAL
Qty: 90 CAPSULE | Refills: 0 | Status: SHIPPED | OUTPATIENT
Start: 2022-10-03

## 2022-10-05 RX ORDER — EPINEPHRINE 0.3 MG/.3ML
INJECTION SUBCUTANEOUS
Qty: 1 EACH | Refills: 1 | Status: SHIPPED | OUTPATIENT
Start: 2022-10-05

## 2022-10-05 RX ORDER — FLUTICASONE PROPIONATE 50 MCG
SPRAY, SUSPENSION (ML) NASAL
Qty: 16 G | Refills: 2 | Status: SHIPPED | OUTPATIENT
Start: 2022-10-05

## 2022-10-10 ENCOUNTER — TELEPHONE (OUTPATIENT)
Dept: FAMILY MEDICINE CLINIC | Age: 67
End: 2022-10-10

## 2022-10-10 NOTE — TELEPHONE ENCOUNTER
Patient called wanting to speak to a member of the clinical staff in regards to having stomach pain at the top of her stomach and chest pain. Patient states she also can't eat because it is bothering her stomach. She is asking for a call back to discuss her symptoms.      Patients call back info;  [phone]

## 2022-10-22 ENCOUNTER — HOSPITAL ENCOUNTER (EMERGENCY)
Age: 67
Discharge: HOME | End: 2022-10-22
Attending: EMERGENCY MEDICINE | Admitting: EMERGENCY MEDICINE
Payer: MEDICARE

## 2022-10-22 ENCOUNTER — APPOINTMENT (OUTPATIENT)
Dept: GENERAL RADIOLOGY | Age: 67
End: 2022-10-22
Attending: EMERGENCY MEDICINE
Payer: MEDICARE

## 2022-10-22 VITALS
BODY MASS INDEX: 27 KG/M2 | TEMPERATURE: 98 F | SYSTOLIC BLOOD PRESSURE: 105 MMHG | OXYGEN SATURATION: 98 % | RESPIRATION RATE: 18 BRPM | HEART RATE: 88 BPM | DIASTOLIC BLOOD PRESSURE: 60 MMHG | WEIGHT: 143 LBS | HEIGHT: 61 IN

## 2022-10-22 DIAGNOSIS — R53.81 MALAISE AND FATIGUE: Primary | ICD-10-CM

## 2022-10-22 DIAGNOSIS — E86.0 DEHYDRATION: ICD-10-CM

## 2022-10-22 DIAGNOSIS — R53.83 MALAISE AND FATIGUE: Primary | ICD-10-CM

## 2022-10-22 LAB
ALBUMIN SERPL-MCNC: 3.5 G/DL (ref 3.4–5)
ALBUMIN/GLOB SERPL: 1 {RATIO} (ref 0.8–1.7)
ALP SERPL-CCNC: 156 U/L (ref 45–117)
ALT SERPL-CCNC: 21 U/L (ref 13–56)
ANION GAP SERPL CALC-SCNC: 11 MMOL/L (ref 3–18)
AST SERPL W P-5'-P-CCNC: 26 U/L (ref 10–38)
BASOPHILS # BLD: 0.1 K/UL (ref 0–0.1)
BASOPHILS NFR BLD: 1 % (ref 0–2)
BILIRUB SERPL-MCNC: 0.4 MG/DL (ref 0.2–1)
BUN SERPL-MCNC: 20 MG/DL (ref 7–18)
BUN/CREAT SERPL: 3 (ref 12–20)
CA-I BLD-MCNC: 7.8 MG/DL (ref 8.5–10.1)
CHLORIDE SERPL-SCNC: 88 MMOL/L (ref 100–111)
CO2 SERPL-SCNC: 33 MMOL/L (ref 21–32)
CREAT SERPL-MCNC: 6.64 MG/DL (ref 0.6–1.3)
DIFFERENTIAL METHOD BLD: ABNORMAL
EOSINOPHIL # BLD: 0.3 K/UL (ref 0–0.4)
EOSINOPHIL NFR BLD: 4 % (ref 0–5)
ERYTHROCYTE [DISTWIDTH] IN BLOOD BY AUTOMATED COUNT: 15.6 % (ref 11.6–14.5)
FLUAV AG NPH QL IA: NEGATIVE
FLUBV AG NOSE QL IA: NEGATIVE
GLOBULIN SER CALC-MCNC: 3.6 G/DL (ref 2–4)
GLUCOSE SERPL-MCNC: 105 MG/DL (ref 74–99)
HCT VFR BLD AUTO: 31 % (ref 35–45)
HGB BLD-MCNC: 10.6 G/DL (ref 12–16)
IMM GRANULOCYTES # BLD AUTO: 0.1 K/UL (ref 0–0.04)
IMM GRANULOCYTES NFR BLD AUTO: 1 % (ref 0–0.5)
LYMPHOCYTES # BLD: 2.6 K/UL (ref 0.9–3.6)
LYMPHOCYTES NFR BLD: 35 % (ref 21–52)
MCH RBC QN AUTO: 34.5 PG (ref 24–34)
MCHC RBC AUTO-ENTMCNC: 34.2 G/DL (ref 31–37)
MCV RBC AUTO: 101 FL (ref 78–100)
MONOCYTES # BLD: 0.8 K/UL (ref 0.05–1.2)
MONOCYTES NFR BLD: 11 % (ref 3–10)
NEUTS SEG # BLD: 3.6 K/UL (ref 1.8–8)
NEUTS SEG NFR BLD: 48 % (ref 40–73)
NRBC # BLD: 0 K/UL (ref 0–0.01)
NRBC BLD-RTO: 0 PER 100 WBC
PLATELET # BLD AUTO: 348 K/UL (ref 135–420)
PMV BLD AUTO: 9.3 FL (ref 9.2–11.8)
POTASSIUM SERPL-SCNC: 4.4 MMOL/L (ref 3.5–5.5)
PROT SERPL-MCNC: 7.1 G/DL (ref 6.4–8.2)
RBC # BLD AUTO: 3.07 M/UL (ref 4.2–5.3)
SODIUM SERPL-SCNC: 132 MMOL/L (ref 136–145)
WBC # BLD AUTO: 7.4 K/UL (ref 4.6–13.2)

## 2022-10-22 PROCEDURE — 74011250636 HC RX REV CODE- 250/636: Performed by: EMERGENCY MEDICINE

## 2022-10-22 PROCEDURE — 85025 COMPLETE CBC W/AUTO DIFF WBC: CPT

## 2022-10-22 PROCEDURE — 36415 COLL VENOUS BLD VENIPUNCTURE: CPT

## 2022-10-22 PROCEDURE — 71045 X-RAY EXAM CHEST 1 VIEW: CPT

## 2022-10-22 PROCEDURE — 99285 EMERGENCY DEPT VISIT HI MDM: CPT | Performed by: EMERGENCY MEDICINE

## 2022-10-22 PROCEDURE — 80053 COMPREHEN METABOLIC PANEL: CPT

## 2022-10-22 PROCEDURE — 87804 INFLUENZA ASSAY W/OPTIC: CPT

## 2022-10-22 PROCEDURE — 96374 THER/PROPH/DIAG INJ IV PUSH: CPT | Performed by: EMERGENCY MEDICINE

## 2022-10-22 PROCEDURE — 93005 ELECTROCARDIOGRAM TRACING: CPT

## 2022-10-22 RX ORDER — ONDANSETRON 2 MG/ML
4 INJECTION INTRAMUSCULAR; INTRAVENOUS ONCE
Status: COMPLETED | OUTPATIENT
Start: 2022-10-22 | End: 2022-10-22

## 2022-10-22 RX ADMIN — SODIUM CHLORIDE 250 ML: 9 INJECTION, SOLUTION INTRAVENOUS at 05:21

## 2022-10-22 RX ADMIN — ONDANSETRON 4 MG: 2 INJECTION INTRAMUSCULAR; INTRAVENOUS at 05:21

## 2022-10-22 NOTE — Clinical Note
[facility] EMERGENCY DEPT  [address] [address]  [phone]    Work/School Note    Date: 10/22/2022    To Whom It May concern:    Gilles Thomas was seen and treated today in the emergency room by the following provider(s):  Attending Provider: Jj Fletcher MD.      Gilles Thomas is excused from work/school on 10/22/22 and 10/23/22. She is medically clear to return to work/school on 10/24/2022.        Sincerely,          Ephraim Deluca MD

## 2022-10-22 NOTE — ED NOTES
Patient sitting on edge of bed, requesting wheelchair, states she is ready to go her ride is here.  Patient offered water, patient refused, states she will go to another hospital to be admitted and get fluids

## 2022-10-22 NOTE — ED PROVIDER NOTES
EMERGENCY DEPARTMENT HISTORY AND PHYSICAL EXAM      Date: 10/22/2022  Patient Name: Jaimie Marinelli    History of Presenting Illness     Chief Complaint   Patient presents with    Dizziness       History Provided By: Patient    HPI: Jaimie Marinelli, 74 y.o. female 74yo F presenting to ed via EMS with nausea and dizziness/lighthadedness with standing. This started today around 4pm.     Reports 4 weeks of abdominal pain told she had an ulcer and being treated for same. takign chicken broath but not much else. Received full dialysis treatment yesterday (friday, MWF). .    There are no other complaints, changes, or physical findings at this time. PCP: Carmen Michele MD    Current Outpatient Medications   Medication Sig Dispense Refill    fluticasone propionate (FLONASE) 50 mcg/actuation nasal spray USE 1 SPRAY IN EACH NOSTRIL EVERY MORNING 16 g 2    EPINEPHrine (EPIPEN) 0.3 mg/0.3 mL injection INJECT 0.3 ML INTRAMUSCULARLY ONCE AS NEEDED FOR ALLERGIC RESPONSE 1 Each 1    Dexilant 60 mg CpDB capsule (delayed release) TAKE 1 CAPSULE BY MOUTH IN THE MORNING. 90 Capsule 0    diclofenac (VOLTAREN) 1 % gel Apply  to affected area four (4) times daily. 200 g 1    lidocaine (LIDODERM) 5 % Apply patch to the affected area for 12 hours a day and remove for 12 hours a day. Indications: h/o rib fracture 30 Each 3    meclizine (ANTIVERT) 25 mg tablet TAKE 1 TABLET THREE TIMES DAILY AS NEEDED FOR DIZZINESS 90 Tablet 0    ondansetron (ZOFRAN ODT) 4 mg disintegrating tablet Take 2 Tablets by mouth every eight (8) hours as needed for Nausea or Nausea or Vomiting. 15 Tablet 0    polyethylene glycol (Miralax) 17 gram packet Take 1 Packet by mouth two (2) times a day. 60 Each 2    acetaminophen-codeine (TYLENOL #3) 300-30 mg per tablet TAKE 1 TABLET BY MOUTH EVERY 4 HOURS FOR UP TO 5 DAYS AS NEEDED      doxercalciferoL (HECTOROL) 4 mcg/2 mL injection 6 mcg by IntraVENous route.       epoetin jalen (EPOGEN;PROCRIT) 10,000 unit/mL injection 10,000 Units by SubCUTAneous route. tuberculin,purif.prot. deriv. (TUBERSOL ID) 0.1 mL by IntraDERMal route. cinacalcet (SENSIPAR) 30 mg tablet Take 30 mg by mouth daily. clopidogreL (PLAVIX) 75 mg tab Take 75 mg by mouth daily. losartan (COZAAR) 50 mg tablet Take 1 Tablet by mouth daily. 90 Tablet 1    cyclobenzaprine (FLEXERIL) 5 mg tablet Take 1 Tablet by mouth three (3) times daily as needed for Muscle Spasm(s). 180 Tablet 0    amiodarone (CORDARONE) 200 mg tablet TAKE 1 TABLET BY MOUTH EVERY DAY 90 Tablet 1    amLODIPine (NORVASC) 10 mg tablet Take 1 tablet by mouth once daily 90 Tablet 1    carvediloL (COREG) 25 mg tablet Take 1 Tablet by mouth two (2) times daily (with meals). 180 Tablet 1    levothyroxine (SYNTHROID) 75 mcg tablet Take 1 Tablet by mouth Daily (before breakfast). 90 Tablet 1    simvastatin (ZOCOR) 20 mg tablet TAKE 1 TABLET BY MOUTH DAILY AS DIRECTED. 90 Tablet 1    fluticasone-umeclidinium-vilanterol (Trelegy Ellipta) 100-62.5-25 mcg inhaler Take 1 Puff by inhalation in the morning. 60 Each 1    valGANciclovir (VALCYTE) 450 mg tablet Take 2 Tablets by mouth in the morning. 180 Tablet 1    dicyclomine (BENTYL) 10 mg capsule Take 1 Capsule by mouth in the morning. 90 Capsule 1    montelukast (SINGULAIR) 10 mg tablet Take 1 Tablet by mouth in the morning. 90 Tablet 1    hyoscyamine SL (LEVSIN/SL) 0.125 mg SL tablet 1 Tablet by SubLINGual route every four (4) hours as needed (irritable bowel). 30 Tablet 2    sucralfate (CARAFATE) 1 gram tablet TAKE 1 TABLET BY MOUTH FOUR TIMES DAILY 360 Tablet 1    apixaban (ELIQUIS) 2.5 mg tablet Take 1 Tablet by mouth two (2) times a day. 180 Tablet 1    zolpidem (AMBIEN) 5 mg tablet Take 1 Tablet by mouth nightly as needed for Sleep. Max Daily Amount: 5 mg. 30 Tablet 0    predniSONE (DELTASONE) 5 mg tablet Take 1 Tablet by mouth daily.       albuterol (PROVENTIL VENTOLIN) 2.5 mg /3 mL (0.083 %) nebu USE 1 VIAL VIA NEBULIZER THREE TIMES DAILY 90 mL 3    calcitRIOL (ROCALTROL) 0.5 mcg capsule Take 0.5 mcg by mouth See Admin Instructions. Take on non dialysis days (Tues, Wed, Thur, Sat, Sun)  Dialysis is on Monday and Friday      RenaPlex-D 800 mcg-12.5 mg -2,000 unit tab Take 1 Tablet by mouth daily. sevelamer carbonate (RENVELA) 800 mg tab tab Take 800 mg by mouth three (3) times daily. aluminum & magnesium hydroxide-simethicone (Maalox Maximum Strength) 400-400-40 mg/5 mL suspension Take 10 mL by mouth every six (6) hours as needed for Indigestion. 250 mL 0    ESTRACE 0.01 % (0.1 mg/gram) vaginal cream INSERT 2 GRAMS INTO VAGINA EVERY MONDAY AND THURSDAY 42.5 g 5    cranberry extract 425 mg cap Take 425 mg by mouth daily.          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    Dialysis patient [facility])     M/W/F    Diverticular disease of colon 08/21/2013    Dyspepsia and other specified disorders of function of stomach     stomach ulcer    Elevated troponin 10/21/2019    Essential hypertension     GERD (gastroesophageal reflux disease)     History of chemotherapy     History of renal transplant 08/21/2013    (LD 2/12/2002)    HLD (hyperlipidemia)     Hypercholesteremia     Hypertension     Hypothyroidism 03/23/2022    Kidney transplant rejection     Menopause     Migraine     Obesity     Pyelonephritis 07/13/2020    Renal cyst 2002    kidney transplant     Spontaneous bacterial peritonitis ([facility] [address] 01/16/2019    Ulcer        Past Surgical History:  Past Surgical History:   Procedure Laterality Date    COLONOSCOPY      Dr Leti Lua  5yrs ago    ENDOSCOPY VISIT-OUTPATIENT      Dr Adalberto Mccain  5 yrs ago    ENDOSCOPY, COLON, DIAGNOSTIC      with Dr. Armando Topete CHOLECYSTECTOMY  02/2021    HX COLONOSCOPY  05/13/2022    HX GI      ulcer    HX HEENT      sinus surg    HX OTHER SURGICAL  02/21/2022    SIGMOIDOSCOPY, FLEXIBLE;  Surgeon: Shaheen Rubio MD    HX RENAL TRANSPLANT      HX TRANSPLANT      kidney transplant 2002    VASCULAR SURGERY PROCEDURE UNLIST      shunt in prep for dialysis       Family History:  Family History   Problem Relation Age of Onset    Diabetes Mother     Cervical Cancer Mother     Arthritis-rheumatoid Mother     Hypertension Father     Diabetes Father     Thyroid Disease Sister     Colon Polyps Brother        Social History:  Social History     Tobacco Use    Smoking status: Never    Smokeless tobacco: Never   Vaping Use    Vaping Use: Never used   Substance Use Topics    Alcohol use: No    Drug use: No       Allergies: Allergies   Allergen Reactions    Aspirin Rash and Unknown (comments)    Bactrim [Sulfamethoxazole-Trimethoprim] Rash and Unknown (comments)    Bromfenac Rash and Unknown (comments)    Cefepime Other (comments)     Neurological reaction    Ceftriaxone Rash    Copper Rash    Ibuprofen Rash and Unknown (comments)    Ketorolac Tromethamine Rash and Unknown (comments)    Relafen [Nabumetone] Rash and Unknown (comments)    Rifampin Rash and Unknown (comments)    Vancomycin Rash and Unknown (comments)     Pt reports causes itching, takes benadryl prior to use. Pt denies anaphylaxis. Requires benadryl with each vancomycin dose     Review of Systems   Review of Systems   Constitutional:  Positive for fatigue. Negative for appetite change and fever. HENT: Negative. Eyes: Negative. Respiratory: Negative. Negative for shortness of breath. Gastrointestinal:  Negative for abdominal pain and nausea. Genitourinary: Negative. Musculoskeletal:  Negative for arthralgias, joint swelling and myalgias.    Skin:  Negative for color change, rash and wound. Neurological:  Positive for dizziness and light-headedness. Negative for weakness and numbness. Psychiatric/Behavioral:  The patient is not nervous/anxious. Physical Exam   Physical Exam  Vitals and nursing note reviewed. Constitutional:       General: She is not in acute distress. Appearance: Normal appearance. She is not ill-appearing. HENT:      Head: Normocephalic and atraumatic. Right Ear: Tympanic membrane and external ear normal.      Left Ear: Tympanic membrane and external ear normal.      Nose: Nose normal.      Mouth/Throat:      Mouth: Mucous membranes are moist.   Eyes:      Conjunctiva/sclera: Conjunctivae normal.   Cardiovascular:      Rate and Rhythm: Normal rate and regular rhythm. Pulses: Normal pulses. Heart sounds: Normal heart sounds. Pulmonary:      Effort: Pulmonary effort is normal.   Abdominal:      General: Abdomen is flat. Palpations: Abdomen is soft. Musculoskeletal:         General: Normal range of motion. Cervical back: Normal range of motion. No rigidity or tenderness. Skin:     General: Skin is warm. Capillary Refill: Capillary refill takes less than 2 seconds. Neurological:      General: No focal deficit present. Mental Status: She is alert and oriented to person, place, and time. Mental status is at baseline. Cranial Nerves: No cranial nerve deficit. Sensory: No sensory deficit. Motor: No weakness. Gait: Gait normal.      Deep Tendon Reflexes: Reflexes normal.      Comments: Normal finger to nose, no truncal ataxia. Psychiatric:         Mood and Affect: Mood normal.         Thought Content:  Thought content normal.        Lab and Diagnostic Study Results   Labs -     Recent Results (from the past 12 hour(s))   CBC WITH AUTOMATED DIFF    Collection Time: 10/22/22  3:15 AM   Result Value Ref Range    WBC 7.4 4.6 - 13.2 K/uL    RBC 3.07 (L) 4.20 - 5.30 M/uL    HGB 10.6 (L) 12.0 - 16.0 g/dL    HCT 31.0 (L) 35.0 - 45.0 %    .0 (H) 78.0 - 100.0 FL    MCH 34.5 (H) 24.0 - 34.0 PG    MCHC 34.2 31.0 - 37.0 g/dL    RDW 15.6 (H) 11.6 - 14.5 %    PLATELET 094 813 - 535 K/uL    MPV 9.3 9.2 - 11.8 FL    NRBC 0.0 0.0  WBC    ABSOLUTE NRBC 0.00 0.00 - 0.01 K/uL    NEUTROPHILS 48 40 - 73 %    LYMPHOCYTES 35 21 - 52 %    MONOCYTES 11 (H) 3 - 10 %    EOSINOPHILS 4 0 - 5 %    BASOPHILS 1 0 - 2 %    IMMATURE GRANULOCYTES 1 (H) 0 - 0.5 %    ABS. NEUTROPHILS 3.6 1.8 - 8.0 K/UL    ABS. LYMPHOCYTES 2.6 0.9 - 3.6 K/UL    ABS. MONOCYTES 0.8 0.05 - 1.2 K/UL    ABS. EOSINOPHILS 0.3 0.0 - 0.4 K/UL    ABS. BASOPHILS 0.1 0.0 - 0.1 K/UL    ABS. IMM. GRANS. 0.1 (H) 0.00 - 0.04 K/UL    DF AUTOMATED     METABOLIC PANEL, COMPREHENSIVE    Collection Time: 10/22/22  3:15 AM   Result Value Ref Range    Sodium 132 (L) 136 - 145 mmol/L    Potassium 4.4 3.5 - 5.5 mmol/L    Chloride 88 (L) 100 - 111 mmol/L    CO2 33 (H) 21 - 32 mmol/L    Anion gap 11 3.0 - 18.0 mmol/L    Glucose 105 (H) 74 - 99 mg/dL    BUN 20 (H) 7 - 18 mg/dL    Creatinine 6.64 (H) 0.60 - 1.30 mg/dL    BUN/Creatinine ratio 3 (L) 12 - 20      eGFR 6 (L) >60 ml/min/1.73m2    Calcium 7.8 (L) 8.5 - 10.1 mg/dL    Bilirubin, total 0.4 0.2 - 1.0 mg/dL    AST (SGOT) 26 10 - 38 U/L    ALT (SGPT) 21 13 - 56 U/L    Alk.  phosphatase 156 (H) 45 - 117 U/L    Protein, total 7.1 6.4 - 8.2 g/dL    Albumin 3.5 3.4 - 5.0 g/dL    Globulin 3.6 2.0 - 4.0 g/dL    A-G Ratio 1.0 0.8 - 1.7     INFLUENZA A & B AG (RAPID TEST)    Collection Time: 10/22/22  3:15 AM   Result Value Ref Range    Influenza A Antigen Negative Negative      Influenza B Antigen Negative Negative     EKG, 12 LEAD, INITIAL    Collection Time: 10/22/22  3:55 AM   Result Value Ref Range    Ventricular Rate 69 BPM    Atrial Rate 69 BPM    P-R Interval 266 ms    QRS Duration 72 ms    Q-T Interval 539 ms    QTC Calculation (Bezet) 578 ms    Calculated P Axis -16 degrees    Calculated R Axis 35 degrees Calculated T Axis 108 degrees    Diagnosis       Sinus rhythm  Prolonged MI interval  Probable left atrial enlargement  Borderline abnrm T, anterolateral leads  Prolonged QT interval         Radiologic Studies -   [unfilled]  CT Results  (Last 48 hours)      None          CXR Results  (Last 48 hours)      None            Medical Decision Making and ED Course   Differential Diagnosis & Medical Decision Making Provider Note:   Pt arrived to ed with generalized fatigue and lighteadedness. No significant fingdings on labs. She reported feeling muhch improved. But upon leaving she sated symptoms returned. We had discussed admissino and patient stated she wished to be admitted. We had performed Orthostatics and shortly after, patient apparently told nurisng her ride was here and she needed to leave. She was otherwise stable but for some relative hypotensino that improved with small bolus of fluids. - I am the first and primary provider for this patient. I reviewed the vital signs, available nursing notes, past medical history, past surgical history, family history and social history. The patient's presenting problems have been discussed, and the staff are in agreement with the care plan formulated and outlined with them. I have encouraged them to ask questions as they arise throughout their visit. Vital Signs-Reviewed the patient's vital signs. Patient Vitals for the past 12 hrs:   Temp Pulse Resp BP SpO2   10/22/22 0601 -- -- -- (!) 108/52 --   10/22/22 0221 98 °F (36.7 °C) 72 16 (!) 96/49 100 %       EKG interpretation: (Preliminary): EKG Interpreted by me. Shows no stemi, no ectopy    ED Course:   ED Course as of 10/24/22 1521   Sat Oct 22, 2022   5869 Ms. Glendy Rivera while walking out stops and states she feels extremely weak. And dizzy. We had previously discussed admission and discussed working towards that diven her initial hypotension.  Asked staff to perform orthostatics as pt now stating those symptoms are mostly Which were slightly postiive, then patient stated her ride was here and she needed to go. [MC]      ED Course User Index  [MC] Daniel Florian MD        Disposition   Disposition: Condition stable  DC- Adult Discharges: All of the diagnostic tests were reviewed and questions answered. Diagnosis, care plan and treatment options were discussed. The patient understands the instructions and will follow up as directed. The patients results have been reviewed with them. They have been counseled regarding their diagnosis. The patient verbally convey understanding and agreement of the signs, symptoms, diagnosis, treatment and prognosis and additionally agrees to follow up as recommended with their PCP in 24 - 48 hours. They also agree with the care-plan and convey that all of their questions have been answered. I have also put together some discharge instructions for them that include: 1) educational information regarding their diagnosis, 2) how to care for their diagnosis at home, as well a 3) list of reasons why they would want to return to the ED prior to their follow-up appointment, should their condition change. DC-The patient was given verbal chest pain warning signs and, abdominal pain warning signs and, dehydration, and follow-up instructions  DC- Pain Control DC Home plan: Nonsteroidals, Tylenol, and Referral Family Medicine/PCP      DISCHARGE PLAN:  1. Current Discharge Medication List        CONTINUE these medications which have NOT CHANGED    Details   fluticasone propionate (FLONASE) 50 mcg/actuation nasal spray USE 1 SPRAY IN EACH NOSTRIL EVERY MORNING  Qty: 16 g, Refills: 2      EPINEPHrine (EPIPEN) 0.3 mg/0.3 mL injection INJECT 0.3 ML INTRAMUSCULARLY ONCE AS NEEDED FOR ALLERGIC RESPONSE  Qty: 1 Each, Refills: 1      Dexilant 60 mg CpDB capsule (delayed release) TAKE 1 CAPSULE BY MOUTH IN THE MORNING.   Qty: 90 Capsule, Refills: 0    Associated Diagnoses: Gastroesophageal reflux disease with esophagitis without hemorrhage      diclofenac (VOLTAREN) 1 % gel Apply  to affected area four (4) times daily. Qty: 200 g, Refills: 1    Comments: Has tolerated a similar medication in the past and allergy history is unreliable. lidocaine (LIDODERM) 5 % Apply patch to the affected area for 12 hours a day and remove for 12 hours a day. Indications: h/o rib fracture  Qty: 30 Each, Refills: 3      meclizine (ANTIVERT) 25 mg tablet TAKE 1 TABLET THREE TIMES DAILY AS NEEDED FOR DIZZINESS  Qty: 90 Tablet, Refills: 0    Associated Diagnoses: Stenosis of left vertebral artery      ondansetron (ZOFRAN ODT) 4 mg disintegrating tablet Take 2 Tablets by mouth every eight (8) hours as needed for Nausea or Nausea or Vomiting. Qty: 15 Tablet, Refills: 0      polyethylene glycol (Miralax) 17 gram packet Take 1 Packet by mouth two (2) times a day. Qty: 60 Each, Refills: 2      acetaminophen-codeine (TYLENOL #3) 300-30 mg per tablet TAKE 1 TABLET BY MOUTH EVERY 4 HOURS FOR UP TO 5 DAYS AS NEEDED      doxercalciferoL (HECTOROL) 4 mcg/2 mL injection 6 mcg by IntraVENous route. epoetin jalen (EPOGEN;PROCRIT) 10,000 unit/mL injection 10,000 Units by SubCUTAneous route. tuberculin,purif.prot. deriv. (TUBERSOL ID) 0.1 mL by IntraDERMal route. cinacalcet (SENSIPAR) 30 mg tablet Take 30 mg by mouth daily. clopidogreL (PLAVIX) 75 mg tab Take 75 mg by mouth daily. losartan (COZAAR) 50 mg tablet Take 1 Tablet by mouth daily. Qty: 90 Tablet, Refills: 1    Associated Diagnoses: Hypertension, unspecified type      cyclobenzaprine (FLEXERIL) 5 mg tablet Take 1 Tablet by mouth three (3) times daily as needed for Muscle Spasm(s).   Qty: 180 Tablet, Refills: 0      amiodarone (CORDARONE) 200 mg tablet TAKE 1 TABLET BY MOUTH EVERY DAY  Qty: 90 Tablet, Refills: 1      amLODIPine (NORVASC) 10 mg tablet Take 1 tablet by mouth once daily  Qty: 90 Tablet, Refills: 1    Associated Diagnoses: Hypertension, unspecified type      carvediloL (COREG) 25 mg tablet Take 1 Tablet by mouth two (2) times daily (with meals). Qty: 180 Tablet, Refills: 1      levothyroxine (SYNTHROID) 75 mcg tablet Take 1 Tablet by mouth Daily (before breakfast). Qty: 90 Tablet, Refills: 1      simvastatin (ZOCOR) 20 mg tablet TAKE 1 TABLET BY MOUTH DAILY AS DIRECTED. Qty: 90 Tablet, Refills: 1      fluticasone-umeclidinium-vilanterol (Trelegy Ellipta) 100-62.5-25 mcg inhaler Take 1 Puff by inhalation in the morning. Qty: 60 Each, Refills: 1      valGANciclovir (VALCYTE) 450 mg tablet Take 2 Tablets by mouth in the morning. Qty: 180 Tablet, Refills: 1    Associated Diagnoses: ESRD (end stage renal disease) on dialysis ([facility])      dicyclomine (BENTYL) 10 mg capsule Take 1 Capsule by mouth in the morning. Qty: 90 Capsule, Refills: 1      montelukast (SINGULAIR) 10 mg tablet Take 1 Tablet by mouth in the morning. Qty: 90 Tablet, Refills: 1      hyoscyamine SL (LEVSIN/SL) 0.125 mg SL tablet 1 Tablet by SubLINGual route every four (4) hours as needed (irritable bowel). Qty: 30 Tablet, Refills: 2    Associated Diagnoses: Gastroesophageal reflux disease with esophagitis without hemorrhage      sucralfate (CARAFATE) 1 gram tablet TAKE 1 TABLET BY MOUTH FOUR TIMES DAILY  Qty: 360 Tablet, Refills: 1      apixaban (ELIQUIS) 2.5 mg tablet Take 1 Tablet by mouth two (2) times a day. Qty: 180 Tablet, Refills: 1      zolpidem (AMBIEN) 5 mg tablet Take 1 Tablet by mouth nightly as needed for Sleep. Max Daily Amount: 5 mg. Qty: 30 Tablet, Refills: 0    Associated Diagnoses: Primary insomnia      predniSONE (DELTASONE) 5 mg tablet Take 1 Tablet by mouth daily. albuterol (PROVENTIL VENTOLIN) 2.5 mg /3 mL (0.083 %) nebu USE 1 VIAL VIA NEBULIZER THREE TIMES DAILY  Qty: 90 mL, Refills: 3    Associated Diagnoses: Mild intermittent asthma without complication      calcitRIOL (ROCALTROL) 0.5 mcg capsule Take 0.5 mcg by mouth See Admin Instructions.  Take on non dialysis days (Tues, Wed, Thur, Sat, Sun)  Dialysis is on Monday and Friday      RenaPlex-D 800 mcg-12.5 mg -2,000 unit tab Take 1 Tablet by mouth daily. sevelamer carbonate (RENVELA) 800 mg tab tab Take 800 mg by mouth three (3) times daily. aluminum & magnesium hydroxide-simethicone (Maalox Maximum Strength) 400-400-40 mg/5 mL suspension Take 10 mL by mouth every six (6) hours as needed for Indigestion. Qty: 250 mL, Refills: 0      ESTRACE 0.01 % (0.1 mg/gram) vaginal cream INSERT 2 GRAMS INTO VAGINA EVERY MONDAY AND THURSDAY  Qty: 42.5 g, Refills: 5      cranberry extract 425 mg cap Take 425 mg by mouth daily. 2.   Follow-up Information       Follow up With Specialties Details Why [address], [place] [facility] Medicine   [facility] [address] [id number]  [phone]      [facility] EMERGENCY DEPT Emergency Medicine Go to  As needed, or for any concerns or deteriorations. , if symptoms persist or worsen. [address]  [phone]          3. Return to ED if worse   4. Current Discharge Medication List            Diagnosis/Clinical Impression     Clinical Impression:   1. Malaise and fatigue    2. Dehydration        Attestations: Hugh Porter MD, am the primary clinician of record. Please note that this dictation was completed with [employer], the computer voice recognition software. Quite often unanticipated grammatical, syntax, homophones, and other interpretive errors are inadvertently transcribed by the computer software. Please disregard these errors. Please excuse any errors that have escaped final proofreading. Thank you.

## 2022-10-22 NOTE — ED NOTES
After attempting to call ride, patient ambulated to door and states \"I'd like to be re-admitted. I think I needed to be admitted instead of discharged\". MD to the bedside to speak with patient.

## 2022-10-22 NOTE — ED NOTES
Patient offered water to help rehydrate her  Due to unsuccessful IV attempts by night shift staff    Patient refused water

## 2022-10-22 NOTE — ED TRIAGE NOTES
Patient arrived via EMS complaining of dizziness and nausea with standing. Patient is ESRD on dialysis and got her full treatment yesterday. Patient states her stomach has been bothering her for a few weeks so she has not been eating and drinking like she is supposed to so she believes she is dehydrated. Patient also reports she got a flu shot yesterday as well.

## 2022-10-22 NOTE — ED NOTES
Discharged via wheel chair to [facility] at this time. No further needs or concerns voiced. Ambulatory to wheel chair with steady gait.

## 2022-10-24 LAB
ATRIAL RATE: 69 BPM
CALCULATED P AXIS, ECG09: -16 DEGREES
CALCULATED R AXIS, ECG10: 35 DEGREES
CALCULATED T AXIS, ECG11: 108 DEGREES
DIAGNOSIS, 93000: NORMAL
P-R INTERVAL, ECG05: 266 MS
Q-T INTERVAL, ECG07: 539 MS
QRS DURATION, ECG06: 72 MS
QTC CALCULATION (BEZET), ECG08: 578 MS
VENTRICULAR RATE, ECG03: 69 BPM

## 2022-10-26 ENCOUNTER — HOSPITAL ENCOUNTER (OUTPATIENT)
Age: 67
Setting detail: OBSERVATION
Discharge: HOME | End: 2022-10-28
Attending: INTERNAL MEDICINE | Admitting: INTERNAL MEDICINE
Payer: MEDICARE

## 2022-10-26 DIAGNOSIS — N18.6 ESRD NEEDING DIALYSIS (HCC): Primary | ICD-10-CM

## 2022-10-26 DIAGNOSIS — Z99.2 ESRD NEEDING DIALYSIS (HCC): Primary | ICD-10-CM

## 2022-10-26 LAB
ALBUMIN SERPL-MCNC: 3.2 G/DL (ref 3.4–5)
ALBUMIN/GLOB SERPL: 0.9 {RATIO} (ref 0.8–1.7)
ALP SERPL-CCNC: 127 U/L (ref 45–117)
ALT SERPL-CCNC: 18 U/L (ref 13–56)
ANION GAP SERPL CALC-SCNC: 9 MMOL/L (ref 3–18)
AST SERPL W P-5'-P-CCNC: 25 U/L (ref 10–38)
BASOPHILS # BLD: 0.1 K/UL (ref 0–0.1)
BASOPHILS NFR BLD: 1 % (ref 0–2)
BILIRUB SERPL-MCNC: 0.4 MG/DL (ref 0.2–1)
BUN SERPL-MCNC: 37 MG/DL (ref 7–18)
BUN/CREAT SERPL: 4 (ref 12–20)
CA-I BLD-MCNC: 7.7 MG/DL (ref 8.5–10.1)
CHLORIDE SERPL-SCNC: 96 MMOL/L (ref 100–111)
CO2 SERPL-SCNC: 26 MMOL/L (ref 21–32)
CREAT SERPL-MCNC: 9.86 MG/DL (ref 0.6–1.3)
DIFFERENTIAL METHOD BLD: ABNORMAL
EOSINOPHIL # BLD: 0.2 K/UL (ref 0–0.4)
EOSINOPHIL NFR BLD: 2 % (ref 0–5)
ERYTHROCYTE [DISTWIDTH] IN BLOOD BY AUTOMATED COUNT: 15.9 % (ref 11.6–14.5)
FLUAV AG NPH QL IA: NEGATIVE
FLUBV AG NOSE QL IA: NEGATIVE
GLOBULIN SER CALC-MCNC: 3.4 G/DL (ref 2–4)
GLUCOSE BLD STRIP.AUTO-MCNC: 139 MG/DL (ref 70–110)
GLUCOSE SERPL-MCNC: 111 MG/DL (ref 74–99)
HCT VFR BLD AUTO: 31.3 % (ref 35–45)
HGB BLD-MCNC: 10 G/DL (ref 12–16)
IMM GRANULOCYTES # BLD AUTO: 0.1 K/UL (ref 0–0.04)
IMM GRANULOCYTES NFR BLD AUTO: 1 % (ref 0–0.5)
LACTATE SERPL-SCNC: 0.8 MMOL/L (ref 0.4–2)
LIPASE SERPL-CCNC: 453 U/L (ref 73–393)
LYMPHOCYTES # BLD: 2.1 K/UL (ref 0.9–3.6)
LYMPHOCYTES NFR BLD: 22 % (ref 21–52)
MAGNESIUM SERPL-MCNC: 4.7 MG/DL (ref 1.6–2.6)
MCH RBC QN AUTO: 31.7 PG (ref 24–34)
MCHC RBC AUTO-ENTMCNC: 31.9 G/DL (ref 31–37)
MCV RBC AUTO: 99.4 FL (ref 78–100)
MONOCYTES # BLD: 0.5 K/UL (ref 0.05–1.2)
MONOCYTES NFR BLD: 5 % (ref 3–10)
NEUTS SEG # BLD: 6.6 K/UL (ref 1.8–8)
NEUTS SEG NFR BLD: 69 % (ref 40–73)
NRBC # BLD: 0 K/UL (ref 0–0.01)
NRBC BLD-RTO: 0 PER 100 WBC
PERFORMED BY, TECHID: ABNORMAL
PLATELET # BLD AUTO: 379 K/UL (ref 135–420)
PMV BLD AUTO: 8.9 FL (ref 9.2–11.8)
POTASSIUM SERPL-SCNC: 4.6 MMOL/L (ref 3.5–5.5)
PROT SERPL-MCNC: 6.6 G/DL (ref 6.4–8.2)
RBC # BLD AUTO: 3.15 M/UL (ref 4.2–5.3)
SODIUM SERPL-SCNC: 131 MMOL/L (ref 136–145)
TROPONIN-HIGH SENSITIVITY: 9 NG/L (ref 0–54)
WBC # BLD AUTO: 9.6 K/UL (ref 4.6–13.2)

## 2022-10-26 PROCEDURE — G0378 HOSPITAL OBSERVATION PER HR: HCPCS

## 2022-10-26 PROCEDURE — 99285 EMERGENCY DEPT VISIT HI MDM: CPT

## 2022-10-26 PROCEDURE — 85025 COMPLETE CBC W/AUTO DIFF WBC: CPT

## 2022-10-26 PROCEDURE — 86706 HEP B SURFACE ANTIBODY: CPT

## 2022-10-26 PROCEDURE — 90935 HEMODIALYSIS ONE EVALUATION: CPT

## 2022-10-26 PROCEDURE — 82962 GLUCOSE BLOOD TEST: CPT

## 2022-10-26 PROCEDURE — 87340 HEPATITIS B SURFACE AG IA: CPT

## 2022-10-26 PROCEDURE — 74011250637 HC RX REV CODE- 250/637: Performed by: NURSE PRACTITIONER

## 2022-10-26 PROCEDURE — 87804 INFLUENZA ASSAY W/OPTIC: CPT

## 2022-10-26 PROCEDURE — G0257 UNSCHED DIALYSIS ESRD PT HOS: HCPCS

## 2022-10-26 PROCEDURE — 83690 ASSAY OF LIPASE: CPT

## 2022-10-26 PROCEDURE — 84484 ASSAY OF TROPONIN QUANT: CPT

## 2022-10-26 PROCEDURE — 80053 COMPREHEN METABOLIC PANEL: CPT

## 2022-10-26 PROCEDURE — 74011250636 HC RX REV CODE- 250/636: Performed by: INTERNAL MEDICINE

## 2022-10-26 PROCEDURE — 83605 ASSAY OF LACTIC ACID: CPT

## 2022-10-26 PROCEDURE — 83735 ASSAY OF MAGNESIUM: CPT

## 2022-10-26 RX ORDER — VALGANCICLOVIR 450 MG/1
900 TABLET, FILM COATED ORAL DAILY
Status: DISCONTINUED | OUTPATIENT
Start: 2022-10-27 | End: 2022-10-28 | Stop reason: HOSPADM

## 2022-10-26 RX ORDER — AMIODARONE HYDROCHLORIDE 200 MG/1
200 TABLET ORAL DAILY
Status: DISCONTINUED | OUTPATIENT
Start: 2022-10-27 | End: 2022-10-28 | Stop reason: HOSPADM

## 2022-10-26 RX ORDER — AMLODIPINE BESYLATE 2.5 MG/1
2.5 TABLET ORAL DAILY
Status: DISCONTINUED | OUTPATIENT
Start: 2022-10-27 | End: 2022-10-28 | Stop reason: HOSPADM

## 2022-10-26 RX ORDER — LOSARTAN POTASSIUM 25 MG/1
50 TABLET ORAL DAILY
Status: DISCONTINUED | OUTPATIENT
Start: 2022-10-27 | End: 2022-10-28 | Stop reason: HOSPADM

## 2022-10-26 RX ORDER — CARVEDILOL 12.5 MG/1
25 TABLET ORAL 2 TIMES DAILY WITH MEALS
Status: DISCONTINUED | OUTPATIENT
Start: 2022-10-26 | End: 2022-10-28 | Stop reason: HOSPADM

## 2022-10-26 RX ORDER — SEVELAMER CARBONATE 800 MG/1
800 TABLET, FILM COATED ORAL 3 TIMES DAILY
Status: DISCONTINUED | OUTPATIENT
Start: 2022-10-26 | End: 2022-10-28 | Stop reason: HOSPADM

## 2022-10-26 RX ORDER — FLUTICASONE FUROATE, UMECLIDINIUM BROMIDE AND VILANTEROL TRIFENATATE 100; 62.5; 25 UG/1; UG/1; UG/1
1 POWDER RESPIRATORY (INHALATION) DAILY
Qty: 60 EACH | Refills: 0 | Status: SHIPPED | OUTPATIENT
Start: 2022-10-26 | End: 2022-11-19 | Stop reason: SDUPTHER

## 2022-10-26 RX ORDER — MONTELUKAST SODIUM 10 MG/1
10 TABLET ORAL DAILY
Status: DISCONTINUED | OUTPATIENT
Start: 2022-10-27 | End: 2022-10-28 | Stop reason: HOSPADM

## 2022-10-26 RX ORDER — LEVOTHYROXINE SODIUM 75 UG/1
75 TABLET ORAL
Status: DISCONTINUED | OUTPATIENT
Start: 2022-10-27 | End: 2022-10-28 | Stop reason: HOSPADM

## 2022-10-26 RX ORDER — CALCITRIOL 0.25 UG/1
0.5 CAPSULE ORAL SEE ADMIN INSTRUCTIONS
Status: DISCONTINUED | OUTPATIENT
Start: 2022-10-26 | End: 2022-10-28 | Stop reason: HOSPADM

## 2022-10-26 RX ORDER — SODIUM CHLORIDE 9 MG/ML
2000 INJECTION, SOLUTION INTRAVENOUS ONCE
Status: COMPLETED | OUTPATIENT
Start: 2022-10-26 | End: 2022-10-27

## 2022-10-26 RX ORDER — ACETAMINOPHEN 325 MG/1
650 TABLET ORAL
Status: DISCONTINUED | OUTPATIENT
Start: 2022-10-26 | End: 2022-10-28 | Stop reason: HOSPADM

## 2022-10-26 RX ORDER — ATORVASTATIN CALCIUM 10 MG/1
10 TABLET, FILM COATED ORAL
Status: DISCONTINUED | OUTPATIENT
Start: 2022-10-26 | End: 2022-10-28 | Stop reason: HOSPADM

## 2022-10-26 RX ORDER — SUCRALFATE 1 G/1
1 TABLET ORAL
Status: DISCONTINUED | OUTPATIENT
Start: 2022-10-26 | End: 2022-10-28 | Stop reason: HOSPADM

## 2022-10-26 RX ORDER — CINACALCET 30 MG/1
30 TABLET, FILM COATED ORAL DAILY
Status: DISCONTINUED | OUTPATIENT
Start: 2022-10-27 | End: 2022-10-28 | Stop reason: HOSPADM

## 2022-10-26 RX ORDER — CLOPIDOGREL BISULFATE 75 MG/1
75 TABLET ORAL DAILY
Status: DISCONTINUED | OUTPATIENT
Start: 2022-10-27 | End: 2022-10-28 | Stop reason: HOSPADM

## 2022-10-26 RX ORDER — FLUTICASONE PROPIONATE 50 MCG
1 SPRAY, SUSPENSION (ML) NASAL DAILY
Status: DISCONTINUED | OUTPATIENT
Start: 2022-10-27 | End: 2022-10-28 | Stop reason: HOSPADM

## 2022-10-26 RX ADMIN — APIXABAN 2.5 MG: 2.5 TABLET, FILM COATED ORAL at 21:53

## 2022-10-26 RX ADMIN — ATORVASTATIN CALCIUM 10 MG: 10 TABLET, FILM COATED ORAL at 21:53

## 2022-10-26 RX ADMIN — SEVELAMER CARBONATE 800 MG: 800 TABLET, FILM COATED ORAL at 21:53

## 2022-10-26 RX ADMIN — SUCRALFATE 1 G: 1 TABLET ORAL at 21:53

## 2022-10-26 RX ADMIN — SODIUM CHLORIDE 2000 ML: 9 INJECTION, SOLUTION INTRAVENOUS at 18:57

## 2022-10-26 NOTE — PROGRESS NOTES
1600- assumed care of the patient in wheelchair, no IV access, in street clothes, no report from ER nurse, patient ambulatory to hospital bed, then taken straight to diaylsis by nursing supervisor.

## 2022-10-26 NOTE — ED NOTES
Yair Jasso called and states that she has spoken to Dr June Mae about dialysis orders and requests that pt be admitted and brought to dialysis suite asap.

## 2022-10-26 NOTE — ED NOTES
Report called to gabriel after 2nd attempt. Pt taken to dialysis per [facility] (dialysis tech) request. Pt taken to dialysis in w/c by [facility].  Cristobal Thurman called and and requested to take bed to dialysis unit per tessa request.

## 2022-10-26 NOTE — PROGRESS NOTES
Problem: Pain  Goal: *Control of Pain  Outcome: Progressing Towards Goal  Goal: *PALLIATIVE CARE:  Alleviation of Pain  Outcome: Progressing Towards Goal     Problem: Patient Education: Go to Patient Education Activity  Goal: Patient/Family Education  Outcome: Progressing Towards Goal     Problem: Falls - Risk of  Goal: *Absence of Falls  Description: Document Semaj Gonzalez Fall Risk and appropriate interventions in the flowsheet. Outcome: Progressing Towards Goal  Note: Fall Risk Interventions:                                Problem: Patient Education: Go to Patient Education Activity  Goal: Patient/Family Education  Outcome: Progressing Towards Goal     Problem: Pressure Injury - Risk of  Goal: *Prevention of pressure injury  Description: Document Quinn Scale and appropriate interventions in the flowsheet.   Outcome: Progressing Towards Goal  Note: Pressure Injury Interventions:                                            Problem: Patient Education: Go to Patient Education Activity  Goal: Patient/Family Education  Outcome: Progressing Towards Goal

## 2022-10-26 NOTE — H&P
History and Physical    Subjective:     Jan Galvez is a 74 y.o. female with past medical history significant for ESRD/HD patient of Dr. Whalen--( cycle), HTN, hypothyroidism, atrial fibrillation, GERD, who presents to the ED today with complaints of diarrhea in the last 3 days. At the time of my evaluation, patient was receiving hemodialysis, she appears somewhat somnolent/lethargic, minimally responsive to questions hence ROS is limited. She however endorsed 10 episodes of diarrhea so far today. Stool is non-bloody, and loose. States diarrhea has been off and on since Monday. No fever or chills. No chest pain, shortness of breath, nausea or vomiting. States to mild abdominal cramping. No other complaints verbalized. Patient endorsed missing scheduled HD last Monday due to diarrhea. Hospitalist was asked to admit.     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    Dialysis patient [facility])     M/W/F    Diverticular disease of colon 08/21/2013    Dyspepsia and other specified disorders of function of stomach     stomach ulcer    Elevated troponin 10/21/2019    Essential hypertension     GERD (gastroesophageal reflux disease)     History of chemotherapy     History of renal transplant 08/21/2013    (LD 2/12/2002)    HLD (hyperlipidemia)     Hypercholesteremia     Hypertension     Hypothyroidism 03/23/2022    Kidney transplant rejection     Menopause     Migraine     Obesity     Pyelonephritis 07/13/2020    Renal cyst 2002    kidney transplant     Spontaneous bacterial peritonitis ([facility] [address] 01/16/2019    Ulcer       Past Surgical History:   Procedure Laterality Date COLONOSCOPY      Dr Franklin Son  5yrs ago    ENDOSCOPY VISIT-OUTPATIENT      Dr Franklin Son  5 yrs ago    ENDOSCOPY, COLON, DIAGNOSTIC      with Dr. Daniel Potts CHOLECYSTECTOMY  02/2021    HX COLONOSCOPY  05/13/2022    HX GI      ulcer    HX HEENT      sinus surg    HX OTHER SURGICAL  02/21/2022    SIGMOIDOSCOPY, FLEXIBLE;  Surgeon: Be Harper MD    HX RENAL TRANSPLANT      HX TRANSPLANT      kidney transplant 2002    VASCULAR SURGERY PROCEDURE UNLIST      shunt in prep for dialysis     Family History   Problem Relation Age of Onset    Diabetes Mother     Cervical Cancer Mother     Arthritis-rheumatoid Mother     Hypertension Father     Diabetes Father     Thyroid Disease Sister     Colon Polyps Brother       Social History     Tobacco Use    Smoking status: Never    Smokeless tobacco: Never   Substance Use Topics    Alcohol use: No       Prior to Admission medications    Medication Sig Start Date End Date Taking? Authorizing Provider   fluticasone-umeclidinium-vilanterol (Trelegy Ellipta) 100-62.5-25 mcg inhaler Take 1 Puff by inhalation daily. Please call  and schedule an appointment with new PCP for future refills. 10/26/22   Bunny Odell MD   fluticasone propionate (FLONASE) 50 mcg/actuation nasal spray USE 1 SPRAY IN [facility] NOSTRIL EVERY MORNING 10/5/22   Mery DANG MD   EPINEPHrine (EPIPEN) 0.3 mg/0.3 mL injection INJECT 0.3 ML INTRAMUSCULARLY ONCE AS NEEDED FOR ALLERGIC RESPONSE 10/5/22   Mery DANG MD   Dexilant 60 mg CpDB capsule (delayed release) TAKE 1 CAPSULE BY MOUTH IN THE MORNING. 10/3/22   Sapphire Arteaga MD   diclofenac (VOLTAREN) 1 % gel Apply  to affected area four (4) times daily. 9/23/22   Bunny Odell MD   lidocaine (LIDODERM) 5 % Apply patch to the affected area for 12 hours a day and remove for 12 hours a day.   Indications: h/o rib fracture 9/22/22   Bunny Odell MD   meclizine (ANTIVERT) 25 mg tablet TAKE 1 TABLET THREE TIMES DAILY AS NEEDED FOR DIZZINESS 9/20/22   Kourtney Virk MD   ondansetron (ZOFRAN ODT) 4 mg disintegrating tablet Take 2 Tablets by mouth every eight (8) hours as needed for Nausea or Nausea or Vomiting. 9/14/22   Jordy Oneill MD   polyethylene glycol (Miralax) 17 gram packet Take 1 Packet by mouth two (2) times a day. 9/14/22   Jordy Oneill MD   acetaminophen-codeine (TYLENOL #3) 300-30 mg per tablet TAKE 1 TABLET BY MOUTH EVERY 4 HOURS FOR UP TO 5 DAYS AS NEEDED 7/23/22   Provider, Historical   doxercalciferoL (HECTOROL) 4 mcg/2 mL injection 6 mcg by IntraVENous route. 3/25/22   Provider, Historical   epoetin jalen (EPOGEN;PROCRIT) 10,000 unit/mL injection 10,000 Units by SubCUTAneous route. 3/28/22   Provider, Historical   tuberculin,purif.prot. deriv. (TUBERSOL ID) 0.1 mL by IntraDERMal route. 7/11/22   Provider, Historical   cinacalcet (SENSIPAR) 30 mg tablet Take 30 mg by mouth daily. Provider, Historical   clopidogreL (PLAVIX) 75 mg tab Take 75 mg by mouth daily. Provider, Historical   losartan (COZAAR) 50 mg tablet Take 1 Tablet by mouth daily. 8/26/22   Kourtney Virk MD   cyclobenzaprine (FLEXERIL) 5 mg tablet Take 1 Tablet by mouth three (3) times daily as needed for Muscle Spasm(s). 8/18/22   Kourtney Virk MD   amiodarone (CORDARONE) 200 mg tablet TAKE 1 TABLET BY MOUTH EVERY DAY 8/16/22   Kourtney Virk MD   amLODIPine [facility]) 10 mg tablet Take 1 tablet by mouth once daily 8/9/22   Kourtney Virk MD   carvediloL (COREG) 25 mg tablet Take 1 Tablet by mouth two (2) times daily (with meals). 7/27/22   Kourtney Virk MD   levothyroxine (SYNTHROID) 75 mcg tablet Take 1 Tablet by mouth Daily (before breakfast). 7/27/22   Kourtney Virk MD   simvastatin (ZOCOR) 20 mg tablet TAKE 1 TABLET BY MOUTH DAILY AS DIRECTED. 7/27/22   Kourtney Virk MD   valGANciclovir (VALCYTE) 450 mg tablet Take 2 Tablets by mouth in the morning. 7/27/22   Kourtney Virk MD   dicyclomine (BENTYL) 10 mg capsule Take 1 Capsule by mouth in the morning. 7/27/22   Stu Roman MD   montelukast (SINGULAIR) 10 mg tablet Take 1 Tablet by mouth in the morning. 7/27/22   tSu Roman MD   hyoscyamine SL (LEVSIN/SL) 0.125 mg SL tablet 1 Tablet by SubLINGual route every four (4) hours as needed (irritable bowel). 7/27/22   Stu Roman MD   sucralfate (CARAFATE) 1 gram tablet TAKE 1 TABLET BY MOUTH FOUR TIMES DAILY 7/27/22   Stu Roman MD   apixaban [facility]) 2.5 mg tablet Take 1 Tablet by mouth two (2) times a day. 7/27/22   Stu Roman MD   zolpidem (AMBIEN) 5 mg tablet Take 1 Tablet by mouth nightly as needed for Sleep. Max Daily Amount: 5 mg. 5/30/22   Stu Roman MD   predniSONE (DELTASONE) 5 mg tablet Take 1 Tablet by mouth daily. Provider, Historical   albuterol (PROVENTIL VENTOLIN) 2.5 mg /3 mL (0.083 %) nebu USE 1 VIAL VIA NEBULIZER THREE TIMES DAILY 12/23/21   Stu Roman MD   calcitRIOL (ROCALTROL) 0.5 mcg capsule Take 0.5 mcg by mouth See Admin Instructions. Take on non dialysis days (Tues, Wed, Thur, Sat, Sun)  Dialysis is on Monday and Friday 7/19/21   Abelardo Yepez MD   RenaPlex-D 800 mcg-12.5 mg -2,000 unit tab Take 1 Tablet by mouth daily. 4/15/21   Abelardo Yepez MD   sevelamer carbonate (RENVELA) 800 mg tab tab Take 800 mg by mouth three (3) times daily. 6/8/21   Abelardo Yepez MD   aluminum & magnesium hydroxide-simethicone (Maalox Maximum Strength) 400-400-40 mg/5 mL suspension Take 10 mL by mouth every six (6) hours as needed for Indigestion. 9/17/20   Milana Tee MD   ESTRACE 0.01 % (0.1 mg/gram) vaginal cream INSERT 2 GRAMS INTO VAGINA EVERY MONDAY AND THURSDAY 4/11/17   Jacki Camacho MD   cranberry extract 425 mg cap Take 425 mg by mouth daily.  1/17/14   Provider, Historical     Allergies   Allergen Reactions    Aspirin Rash and Unknown (comments)    Bactrim [Sulfamethoxazole-Trimethoprim] Rash and Unknown (comments)    Bromfenac Rash and Unknown (comments)    Cefepime Other (comments)     Neurological reaction Ceftriaxone Rash    Copper Rash    Ibuprofen Rash and Unknown (comments)    Ketorolac Tromethamine Rash and Unknown (comments)    Relafen [Nabumetone] Rash and Unknown (comments)    Rifampin Rash and Unknown (comments)    Vancomycin Rash and Unknown (comments)     Pt reports causes itching, takes benadryl prior to use. Pt denies anaphylaxis. Requires benadryl with each vancomycin dose        Review of Systems:  Limited, due to drowsiness, however reported negative except as mentioned in HPI. Objective:     Vitals:  Visit Vitals  /75   Pulse (!) 55   Temp 97.5 °F (36.4 °C)   Resp 20   Ht 5' 1\" (1.549 m)   Wt 64.4 kg (142 lb)   SpO2 100%   BMI 26.83 kg/m²       Physical Exam:  General: Middle aged female, appearing stated age, she appears drowsy, somewhat lethargic and minimally responsive to questions, on room air, in no distress. Head:  Normocephalic, without obvious abnormality, atraumatic. Eyes:  Conjunctivae/corneas clear. Pupils equal, round, reactive to light. Extraocular movements intact. No icterus. Lungs:  Clear to auscultation bilaterally, no wheezes, no crackles. Chest wall: No tenderness or deformity. Heart: Regular rate and rhythm, S1, S2 normal, no murmur, click, rub, or gallop. Abdomen: Soft, non-tender, bowel sounds active, no palpable masses. Extremities:Extremities normal, atraumatic, no cyanosis or edema. Pulses: 2+ and symmetric all extremities. Skin: Not pale, not jaundiced, overall, skin color, texture, turgor normal.   Lymph nodes: Cervical nodes normal.  Neurologic: Drowsy, somewhat lethargic, minimally follows command.     Labs:  Recent Results (from the past 24 hour(s))   INFLUENZA A & B AG (RAPID TEST)    Collection Time: 10/26/22  2:05 PM   Result Value Ref Range    Influenza A Antigen Negative Negative      Influenza B Antigen Negative Negative     CBC WITH AUTOMATED DIFF    Collection Time: 10/26/22  2:20 PM   Result Value Ref Range    WBC 9.6 4.6 - 13.2 K/uL RBC 3.15 (L) 4.20 - 5.30 M/uL    HGB 10.0 (L) 12.0 - 16.0 g/dL    HCT 31.3 (L) 35.0 - 45.0 %    MCV 99.4 78.0 - 100.0 FL    MCH 31.7 24.0 - 34.0 PG    MCHC 31.9 31.0 - 37.0 g/dL    RDW 15.9 (H) 11.6 - 14.5 %    PLATELET 559 648 - 861 K/uL    MPV 8.9 (L) 9.2 - 11.8 FL    NRBC 0.0 0.0  WBC    ABSOLUTE NRBC 0.00 0.00 - 0.01 K/uL    NEUTROPHILS 69 40 - 73 %    LYMPHOCYTES 22 21 - 52 %    MONOCYTES 5 3 - 10 %    EOSINOPHILS 2 0 - 5 %    BASOPHILS 1 0 - 2 %    IMMATURE GRANULOCYTES 1 (H) 0 - 0.5 %    ABS. NEUTROPHILS 6.6 1.8 - 8.0 K/UL    ABS. LYMPHOCYTES 2.1 0.9 - 3.6 K/UL    ABS. MONOCYTES 0.5 0.05 - 1.2 K/UL    ABS. EOSINOPHILS 0.2 0.0 - 0.4 K/UL    ABS. BASOPHILS 0.1 0.0 - 0.1 K/UL    ABS. IMM. GRANS. 0.1 (H) 0.00 - 0.04 K/UL    DF AUTOMATED     METABOLIC PANEL, COMPREHENSIVE    Collection Time: 10/26/22  2:20 PM   Result Value Ref Range    Sodium 131 (L) 136 - 145 mmol/L    Potassium 4.6 3.5 - 5.5 mmol/L    Chloride 96 (L) 100 - 111 mmol/L    CO2 26 21 - 32 mmol/L    Anion gap 9 3.0 - 18.0 mmol/L    Glucose 111 (H) 74 - 99 mg/dL    BUN 37 (H) 7 - 18 mg/dL    Creatinine 9.86 (H) 0.60 - 1.30 mg/dL    BUN/Creatinine ratio 4 (L) 12 - 20      eGFR 4 (L) >60 ml/min/1.73m2    Calcium 7.7 (L) 8.5 - 10.1 mg/dL    Bilirubin, total 0.4 0.2 - 1.0 mg/dL    AST (SGOT) 25 10 - 38 U/L    ALT (SGPT) 18 13 - 56 U/L    Alk.  phosphatase 127 (H) 45 - 117 U/L    Protein, total 6.6 6.4 - 8.2 g/dL    Albumin 3.2 (L) 3.4 - 5.0 g/dL    Globulin 3.4 2.0 - 4.0 g/dL    A-G Ratio 0.9 0.8 - 1.7     TROPONIN-HIGH SENSITIVITY    Collection Time: 10/26/22  2:20 PM   Result Value Ref Range    Troponin-High Sensitivity 9 0 - 54 ng/L   LIPASE    Collection Time: 10/26/22  2:20 PM   Result Value Ref Range    Lipase 453 (H) 73 - 393 U/L   LACTIC ACID    Collection Time: 10/26/22  2:20 PM   Result Value Ref Range    Lactic acid 0.8 0.4 - 2.0 mmol/L   MAGNESIUM    Collection Time: 10/26/22  2:20 PM   Result Value Ref Range    Magnesium 4.7 (H) 1.6 - 2.6 mg/dL   GLUCOSE, POC    Collection Time: 10/26/22  4:13 PM   Result Value Ref Range    Glucose (POC) 139 (H) 70 - 110 mg/dL    Performed by Annette Ribeiro        Imaging:  No results found. Assessment & Plan:     #1: Diarrhea:  -admit to observation, telemetry.   -r/o c-diff, also enteric stool panel is pending,   -maintain contact precautions. -her lactic acid is 0.8. no fever or leukocytosis. mildly elevated lipase of 453.   -Hx of extensive colonic diverticulosis, recent colonoscopy 5/13/22, at [facility], which shows Diverticulosis in the sigmoid colon and in the descending colon, Non-bleeding internal hemorrhoids, also recently admitted for colitis, and treated with IV abx,on 9/13/22-seen by GI at the time, with an impression of a possible transient ischemia due to microvascular disease, and/or SCAD as etiologies for her abdominal pain, placed on dicyclomine prn.  -will recheck lactic acid in a.m., follow stool studies. -consider repeat CT scan if warranted. #2: ESRD needing hemodialysis:  -pt reports missing HD, she is a patient of Dr Tao Lima and he already placed HD orders.  -Further management per nephrology.  -Hx of failed renal transplant (2013). #3: Hypertension:  -normotensive thus far  -cont amlodipine, losartan and carvedilol per home dose. #4: Hypothyroidism:  -Continue levothyroxine. #5: Anemia:  -suspect anemia of chronic disease. Chronic.  -H/H stable at 10/31. No reports of bleeding. #6: Paroxysmal atrial fibrillation:  -chronic issue, cont amiodarone and Eliquis. #7: History of cytomegalovirus:  -Chronically on valacyclovir. VTE prophylaxis: Eliquis.   Code status: Full code

## 2022-10-26 NOTE — PROGRESS NOTES
Ultrasound guided PIV #22 placed to right posterior forearm. + Blood return, flushes without difficulty. Pt in dialysis at this time.

## 2022-10-26 NOTE — ED PROVIDER NOTES
EMERGENCY DEPARTMENT HISTORY AND PHYSICAL EXAM      Date: 10/26/2022  Patient Name: Isa Ennis    History of Presenting Illness     Chief Complaint   Patient presents with    Diarrhea       History Provided By: Patient    HPI: Isa Ennis, 74 y.o. female with history of ESRD-MWF HD, hypertension, asthma, CMV antibody positive, migraine, HLD, GERD, hypothyroid, renal transplant' 02, cholecystectomy that presents with diarrhea for the past 3 days. Patient states she has not been able to eat anything because it goes right through her. She denies melena or bright red blood per rectum. States to generalized weakness. Nephrology: Dr. Nuvia Law. She missed her dialysis this past Monday. Patient was in the emergency room on 10/22/2022 for malaise and fatigue. Sodium 132, CO2 33, BUN 20, creatinine 6.6, influenza A/B negative. She denies fever chills, shortness of breath, chest pain or abdominal pain. There are no other complaints, changes, or physical findings at this time. PCP: Rober Honeycutt MD    Current Facility-Administered Medications   Medication Dose Route Frequency Provider Last Rate Last Admin    sodium chloride 0.9 % bolus infusion 250 mL  250 mL IntraVENous ONCE Diane Bean MD         Current Outpatient Medications   Medication Sig Dispense Refill    fluticasone-umeclidinium-vilanterol (Trelegy Ellipta) 100-62.5-25 mcg inhaler Take 1 Puff by inhalation daily. Please call  and schedule an appointment with new PCP for future refills. 60 Each 0    fluticasone propionate (FLONASE) 50 mcg/actuation nasal spray USE 1 SPRAY IN EACH NOSTRIL EVERY MORNING 16 g 2    EPINEPHrine (EPIPEN) 0.3 mg/0.3 mL injection INJECT 0.3 ML INTRAMUSCULARLY ONCE AS NEEDED FOR ALLERGIC RESPONSE 1 Each 1    Dexilant 60 mg CpDB capsule (delayed release) TAKE 1 CAPSULE BY MOUTH IN THE MORNING. 90 Capsule 0    diclofenac (VOLTAREN) 1 % gel Apply  to affected area four (4) times daily.  200 g 1    lidocaine (LIDODERM) 5 % Apply patch to the affected area for 12 hours a day and remove for 12 hours a day. Indications: h/o rib fracture 30 Each 3    meclizine (ANTIVERT) 25 mg tablet TAKE 1 TABLET THREE TIMES DAILY AS NEEDED FOR DIZZINESS 90 Tablet 0    ondansetron (ZOFRAN ODT) 4 mg disintegrating tablet Take 2 Tablets by mouth every eight (8) hours as needed for Nausea or Nausea or Vomiting. 15 Tablet 0    polyethylene glycol (Miralax) 17 gram packet Take 1 Packet by mouth two (2) times a day. 60 Each 2    acetaminophen-codeine (TYLENOL #3) 300-30 mg per tablet TAKE 1 TABLET BY MOUTH EVERY 4 HOURS FOR UP TO 5 DAYS AS NEEDED      doxercalciferoL (HECTOROL) 4 mcg/2 mL injection 6 mcg by IntraVENous route. epoetin jalen (EPOGEN;PROCRIT) 10,000 unit/mL injection 10,000 Units by SubCUTAneous route. tuberculin,purif.prot. deriv. (TUBERSOL ID) 0.1 mL by IntraDERMal route. cinacalcet (SENSIPAR) 30 mg tablet Take 30 mg by mouth daily. clopidogreL (PLAVIX) 75 mg tab Take 75 mg by mouth daily. losartan (COZAAR) 50 mg tablet Take 1 Tablet by mouth daily. 90 Tablet 1    cyclobenzaprine (FLEXERIL) 5 mg tablet Take 1 Tablet by mouth three (3) times daily as needed for Muscle Spasm(s). 180 Tablet 0    amiodarone (CORDARONE) 200 mg tablet TAKE 1 TABLET BY MOUTH EVERY DAY 90 Tablet 1    amLODIPine (NORVASC) 10 mg tablet Take 1 tablet by mouth once daily 90 Tablet 1    carvediloL (COREG) 25 mg tablet Take 1 Tablet by mouth two (2) times daily (with meals). 180 Tablet 1    levothyroxine (SYNTHROID) 75 mcg tablet Take 1 Tablet by mouth Daily (before breakfast). 90 Tablet 1    simvastatin (ZOCOR) 20 mg tablet TAKE 1 TABLET BY MOUTH DAILY AS DIRECTED. 90 Tablet 1    valGANciclovir (VALCYTE) 450 mg tablet Take 2 Tablets by mouth in the morning. 180 Tablet 1    dicyclomine (BENTYL) 10 mg capsule Take 1 Capsule by mouth in the morning.  90 Capsule 1    montelukast (SINGULAIR) 10 mg tablet Take 1 Tablet by mouth in the morning. 90 Tablet 1    hyoscyamine SL (LEVSIN/SL) 0.125 mg SL tablet 1 Tablet by SubLINGual route every four (4) hours as needed (irritable bowel). 30 Tablet 2    sucralfate (CARAFATE) 1 gram tablet TAKE 1 TABLET BY MOUTH FOUR TIMES DAILY 360 Tablet 1    apixaban (ELIQUIS) 2.5 mg tablet Take 1 Tablet by mouth two (2) times a day. 180 Tablet 1    zolpidem (AMBIEN) 5 mg tablet Take 1 Tablet by mouth nightly as needed for Sleep. Max Daily Amount: 5 mg. 30 Tablet 0    predniSONE (DELTASONE) 5 mg tablet Take 1 Tablet by mouth daily. albuterol (PROVENTIL VENTOLIN) 2.5 mg /3 mL (0.083 %) nebu USE 1 VIAL VIA NEBULIZER THREE TIMES DAILY 90 mL 3    calcitRIOL (ROCALTROL) 0.5 mcg capsule Take 0.5 mcg by mouth See Admin Instructions. Take on non dialysis days (Tues, Wed, Thur, Sat, Sun)  Dialysis is on Monday and Friday      RenaPlex-D 800 mcg-12.5 mg -2,000 unit tab Take 1 Tablet by mouth daily. sevelamer carbonate (RENVELA) 800 mg tab tab Take 800 mg by mouth three (3) times daily. aluminum & magnesium hydroxide-simethicone (Maalox Maximum Strength) 400-400-40 mg/5 mL suspension Take 10 mL by mouth every six (6) hours as needed for Indigestion. 250 mL 0    ESTRACE 0.01 % (0.1 mg/gram) vaginal cream INSERT 2 GRAMS INTO VAGINA EVERY MONDAY AND THURSDAY 42.5 g 5    cranberry extract 425 mg cap Take 425 mg by mouth daily.          Past History   Past Medical History:  Past Medical History:   Diagnosis Date    [facility]FF [facility] 05/09/2019    Anemia NEC     Anxiety     Arrhythmia     Asthma     Asthma     Burning with urination     frequent uti    Cholecystitis 01/12/2019    Chronic kidney disease     dialysis    CMV (cytomegalovirus) antibody positive     COPD (chronic obstructive pulmonary disease) ([facility]     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    Dialysis patient [facility])     M/W/F    Diverticular disease of colon 08/21/2013    Dyspepsia and other specified disorders of function of stomach     stomach ulcer Elevated troponin 10/21/2019    Essential hypertension     GERD (gastroesophageal reflux disease)     History of chemotherapy     History of renal transplant 08/21/2013    (LD 2/12/2002)    HLD (hyperlipidemia)     Hypercholesteremia     Hypertension     Hypothyroidism 03/23/2022    Kidney transplant rejection     Menopause     Migraine     Obesity     Pyelonephritis 07/13/2020    Renal cyst 2002    kidney transplant     Spontaneous bacterial peritonitis ([facility] 01/16/2019    Ulcer        Past Surgical History:  Past Surgical History:   Procedure Laterality Date    COLONOSCOPY      Dr Anne Marie Natarajan  5yrs ago    ENDOSCOPY VISIT-OUTPATIENT      Dr Anne Marie Natarajan  5 yrs ago    ENDOSCOPY, COLON, DIAGNOSTIC      with Dr. Benny Evans CHOLECYSTECTOMY  02/2021    HX COLONOSCOPY  05/13/2022    HX GI      ulcer    HX HEENT      sinus surg    HX OTHER SURGICAL  02/21/2022    SIGMOIDOSCOPY, FLEXIBLE;  Surgeon: Everardo Yeung MD    HX RENAL TRANSPLANT      HX TRANSPLANT      kidney transplant 2002    VASCULAR SURGERY PROCEDURE UNLIST      shunt in prep for dialysis       Family History:  Family History   Problem Relation Age of Onset    Diabetes Mother     Cervical Cancer Mother     Arthritis-rheumatoid Mother     Hypertension Father     Diabetes Father     Thyroid Disease Sister     Colon Polyps Brother        Social History:  Social History     Tobacco Use    Smoking status: Never    Smokeless tobacco: Never   Vaping Use    Vaping Use: Never used   Substance Use Topics    Alcohol use: No    Drug use: No       Allergies:   Allergies   Allergen Reactions    Aspirin Rash and Unknown (comments)    Bactrim [Sulfamethoxazole-Trimethoprim] Rash and Unknown (comments)    Bromfenac Rash and Unknown (comments)    Cefepime Other (comments)     Neurological reaction    Ceftriaxone Rash    Copper Rash    Ibuprofen Rash and Unknown (comments)    Ketorolac Tromethamine Rash and Unknown (comments)    Relafen [Nabumetone] Rash and Unknown (comments) Rifampin Rash and Unknown (comments)    Vancomycin Rash and Unknown (comments)     Pt reports causes itching, takes benadryl prior to use. Pt denies anaphylaxis. Requires benadryl with each vancomycin dose     Review of Systems   Review of Systems   Constitutional:  Negative for chills and fever. HENT:  Negative for sore throat. Eyes:  Negative for redness. Respiratory:  Negative for cough and shortness of breath. Cardiovascular:  Negative for chest pain and palpitations. Gastrointestinal:  Positive for diarrhea and nausea. Negative for abdominal pain and blood in stool. Musculoskeletal:  Negative for arthralgias. Skin:  Negative for rash. Neurological:  Negative for headaches. Psychiatric/Behavioral:  Negative for confusion. Physical Exam   Physical Exam  Vitals and nursing note reviewed. Constitutional:       General: She is not in acute distress. Appearance: Normal appearance. She is not ill-appearing, toxic-appearing or diaphoretic. HENT:      Head: Normocephalic. Mouth/Throat:      Pharynx: Oropharynx is clear. No posterior oropharyngeal erythema. Eyes:      Extraocular Movements: Extraocular movements intact. Conjunctiva/sclera: Conjunctivae normal.   Cardiovascular:      Rate and Rhythm: Regular rhythm. Pulses: Normal pulses. Heart sounds: Normal heart sounds. No murmur heard. Pulmonary:      Effort: Pulmonary effort is normal. No respiratory distress. Breath sounds: Normal breath sounds. No wheezing or rales. Abdominal:      General: Bowel sounds are normal. There is no distension. Palpations: Abdomen is soft. Tenderness: There is no abdominal tenderness. There is no guarding. Musculoskeletal:         General: Normal range of motion. Cervical back: Neck supple. Right lower leg: No edema. Left lower leg: No edema. Skin:     General: Skin is warm and dry.    Neurological:      Mental Status: She is alert and oriented to person, place, and time. Psychiatric:         Behavior: Behavior normal.       Lab and Diagnostic Study Results   Labs -     Recent Results (from the past 12 hour(s))   INFLUENZA A & B AG (RAPID TEST)    Collection Time: 10/26/22  2:05 PM   Result Value Ref Range    Influenza A Antigen Negative Negative      Influenza B Antigen Negative Negative     CBC WITH AUTOMATED DIFF    Collection Time: 10/26/22  2:20 PM   Result Value Ref Range    WBC 9.6 4.6 - 13.2 K/uL    RBC 3.15 (L) 4.20 - 5.30 M/uL    HGB 10.0 (L) 12.0 - 16.0 g/dL    HCT 31.3 (L) 35.0 - 45.0 %    MCV 99.4 78.0 - 100.0 FL    MCH 31.7 24.0 - 34.0 PG    MCHC 31.9 31.0 - 37.0 g/dL    RDW 15.9 (H) 11.6 - 14.5 %    PLATELET 375 130 - 763 K/uL    MPV 8.9 (L) 9.2 - 11.8 FL    NRBC 0.0 0.0  WBC    ABSOLUTE NRBC 0.00 0.00 - 0.01 K/uL    NEUTROPHILS 69 40 - 73 %    LYMPHOCYTES 22 21 - 52 %    MONOCYTES 5 3 - 10 %    EOSINOPHILS 2 0 - 5 %    BASOPHILS 1 0 - 2 %    IMMATURE GRANULOCYTES 1 (H) 0 - 0.5 %    ABS. NEUTROPHILS 6.6 1.8 - 8.0 K/UL    ABS. LYMPHOCYTES 2.1 0.9 - 3.6 K/UL    ABS. MONOCYTES 0.5 0.05 - 1.2 K/UL    ABS. EOSINOPHILS 0.2 0.0 - 0.4 K/UL    ABS. BASOPHILS 0.1 0.0 - 0.1 K/UL    ABS. IMM. GRANS. 0.1 (H) 0.00 - 0.04 K/UL    DF AUTOMATED     METABOLIC PANEL, COMPREHENSIVE    Collection Time: 10/26/22  2:20 PM   Result Value Ref Range    Sodium 131 (L) 136 - 145 mmol/L    Potassium 4.6 3.5 - 5.5 mmol/L    Chloride 96 (L) 100 - 111 mmol/L    CO2 26 21 - 32 mmol/L    Anion gap 9 3.0 - 18.0 mmol/L    Glucose 111 (H) 74 - 99 mg/dL    BUN 37 (H) 7 - 18 mg/dL    Creatinine 9.86 (H) 0.60 - 1.30 mg/dL    BUN/Creatinine ratio 4 (L) 12 - 20      eGFR 4 (L) >60 ml/min/1.73m2    Calcium 7.7 (L) 8.5 - 10.1 mg/dL    Bilirubin, total 0.4 0.2 - 1.0 mg/dL    AST (SGOT) 25 10 - 38 U/L    ALT (SGPT) 18 13 - 56 U/L    Alk.  phosphatase 127 (H) 45 - 117 U/L    Protein, total 6.6 6.4 - 8.2 g/dL    Albumin 3.2 (L) 3.4 - 5.0 g/dL    Globulin 3.4 2.0 - 4.0 g/dL    A-G Ratio 0.9 0.8 - 1.7     TROPONIN-HIGH SENSITIVITY    Collection Time: 10/26/22  2:20 PM   Result Value Ref Range    Troponin-High Sensitivity 9 0 - 54 ng/L   LIPASE    Collection Time: 10/26/22  2:20 PM   Result Value Ref Range    Lipase 453 (H) 73 - 393 U/L   LACTIC ACID    Collection Time: 10/26/22  2:20 PM   Result Value Ref Range    Lactic acid 0.8 0.4 - 2.0 mmol/L   MAGNESIUM    Collection Time: 10/26/22  2:20 PM   Result Value Ref Range    Magnesium 4.7 (H) 1.6 - 2.6 mg/dL       Radiologic Studies -   [unfilled]  CT Results  (Last 48 hours)      None          CXR Results  (Last 48 hours)      None            Medical Decision Making and ED Course   Differential Diagnosis & Medical Decision Making Provider Note:   Diarrhea: Viruses-[place] rotavirus, preformed toxins such as staff aureus Bacillus cereus C perfringens, toxin induced due to E. coli, vibrio, protozoa such as Giardia, infectious such as Salmonella Shigella Campylobacter associated with noninfectious drug reaction ulcerative colitis ischemic colitis fecal impaction laxative abuse radiation emotional stress of malabsorption    - I am the first and primary provider for this patient. I reviewed the vital signs, available nursing notes, past medical history, past surgical history, family history and social history. The patient's presenting problems have been discussed, and the staff are in agreement with the care plan formulated and outlined with them. I have encouraged them to ask questions as they arise throughout their visit. Vital Signs-Reviewed the patient's vital signs. Patient Vitals for the past 12 hrs:   Temp Pulse Resp BP SpO2   10/26/22 1301 97.5 °F (36.4 °C) 66 16 135/69 100 %       EKG interpretation: (Preliminary): EKG Interpreted by me. Shows     ED Course:    3:31 PM  Dialysis orders written by Dr June Mae. Did not speak to him.  Discussed with Dr Vincent Chandler; will admit to obs  Procedures and Critical Care         Disposition Disposition: Admit      Diagnosis/Clinical Impression     Clinical Impression:   1. ESRD needing dialysis [facility])        Attestations: Jef Terrell MD, am the primary clinician of record. Please note that this dictation was completed with [email or web address], the computer voice recognition software. Quite often unanticipated grammatical, syntax, homophones, and other interpretive errors are inadvertently transcribed by the computer software. Please disregard these errors. Please excuse any errors that have escaped final proofreading. Thank you.

## 2022-10-26 NOTE — ED NOTES
TRANSFER - OUT REPORT:    Verbal report given to (name) on Santiago Bell  being transferred to  [address] [place] [address](unit) for routine progression of care       Report consisted of patients Situation, Background, Assessment and   Recommendations(SBAR). Information from the following report(s) ED Summary was reviewed with the receiving nurse. Lines:       Opportunity for questions and clarification was provided.       Patient transported with:   w/c to dialysis

## 2022-10-26 NOTE — DIALYSIS
Hemodialysis / [phone]    Vitals Pre Post Assessment Pre Post   BP BP: 121/75 (10/26/22 1534) 123/46 LOC A & O x 4 A & O x 4   HR Pulse (Heart Rate): (!) 55 (10/26/22 1534)   72 Lungs No distress,room air,clear Room air   Resp Resp Rate: 20 (10/26/22 1534) 16 Cardiac HR low 50s, consistent with notes, regular rhythm HR 72   Temp Temp: 97.5 °F (36.4 °C) (10/26/22 1301) 98.5 Skin No uncovered wounds noted No uncovered wounds noted   Weight Obtain by floor staff  Edema none none   Tele status Monitor by floor staff  Pain Pain Intensity 1: 0 (10/26/22 1301) None / no complaints during dialysis     Orders   Duration: Start: 1610 End: 1910 Total: 3   Dialyzer: Dialyzer/Set Up Inspection: Revkassandraar (10/26/22 1550)   K Bath:  2   Ca Bath:  2.5   Na:  138   Bicarb:  35   Target Fluid Removal: 2000 ml     Access   Type & Location: LUE AVF, bruit and thrill noted / no s/s of infection / disinfect per P & P, cannulate without difficulty   Comments:                                        Labs   HBsAg (Antigen) / date:     10-3-22 negative                                          HBsAb (Antibody) / date: 8-12-22 susceptible   Source: Clinic labs   Obtained/Reviewed  Critical Results Called HGB   Date Value Ref Range Status   10/26/2022 10.0 (L) 12.0 - 16.0 g/dL Final     Potassium   Date Value Ref Range Status   10/26/2022 4.6 3.5 - 5.5 mmol/L Final     Calcium   Date Value Ref Range Status   10/26/2022 7.7 (L) 8.5 - 10.1 mg/dL Final     BUN   Date Value Ref Range Status   10/26/2022 37 (H) 7 - 18 mg/dL Final     Creatinine   Date Value Ref Range Status   10/26/2022 9.86 (H) 0.60 - 1.30 mg/dL Final        Meds Given   Name Dose Route   none                 Adequacy / Fluid    Total Liters Process: 54   Net Fluid Removed: 2000 ml      Comments   Time Out Done:   (Time) 1605   Admitting Diagnosis: ESRD   Consent obtained/signed: Informed Consent Verified: Yes (10/26/22 1550)   Machine / RO #  [id number]   Primary Nurse Rpt Pre: Marquez Landis LPN   Primary Nurse Rpt Post: Bryon Bhatti RN   Pt Education: Importance of attending scheduled dialysis   Care Plan: Continue with hemodialysis as scheduled   Pts outpatient clinic: [facility]     Tx Summary   Comments:                no pain, no distress,no complaints,no SOB.  Rested much of treatment / nurse supervisors Anjana Oro &  NP Benigno & primary nurse LUIS A Buchanan RN, rounding and in to check on patient during treatment  / post treatment, all possible blood returned / hemostasis / avf bruit and thrill noted

## 2022-10-27 ENCOUNTER — APPOINTMENT (OUTPATIENT)
Dept: CT IMAGING | Age: 67
End: 2022-10-27
Attending: NURSE PRACTITIONER
Payer: MEDICARE

## 2022-10-27 LAB
ANION GAP SERPL CALC-SCNC: 5 MMOL/L (ref 3–18)
APPEARANCE UR: ABNORMAL
BACTERIA URNS QL MICRO: ABNORMAL /HPF
BACTERIA URNS QL MICRO: NORMAL /HPF
BASOPHILS # BLD: 0 K/UL (ref 0–0.1)
BASOPHILS NFR BLD: 1 % (ref 0–2)
BILIRUB UR QL: NEGATIVE
BUN SERPL-MCNC: 21 MG/DL (ref 7–18)
BUN/CREAT SERPL: 4 (ref 12–20)
CA-I BLD-MCNC: 7.7 MG/DL (ref 8.5–10.1)
CHLORIDE SERPL-SCNC: 98 MMOL/L (ref 100–111)
CO2 SERPL-SCNC: 31 MMOL/L (ref 21–32)
COLOR UR: YELLOW
CREAT SERPL-MCNC: 5.71 MG/DL (ref 0.6–1.3)
DIFFERENTIAL METHOD BLD: ABNORMAL
EOSINOPHIL # BLD: 0 K/UL (ref 0–0.4)
EOSINOPHIL NFR BLD: 1 % (ref 0–5)
EPITH CASTS URNS QL MICRO: ABNORMAL /LPF (ref 0–20)
EPITH CASTS URNS QL MICRO: NORMAL /LPF (ref 0–20)
ERYTHROCYTE [DISTWIDTH] IN BLOOD BY AUTOMATED COUNT: 16 % (ref 11.6–14.5)
GLUCOSE SERPL-MCNC: 150 MG/DL (ref 74–99)
GLUCOSE UR STRIP.AUTO-MCNC: NEGATIVE MG/DL
HBV SURFACE AB SER QL: NONREACTIVE
HBV SURFACE AB SER-ACNC: <3.1 MIU/ML
HBV SURFACE AG SER QL: <0.1 INDEX
HBV SURFACE AG SER QL: NEGATIVE
HCT VFR BLD AUTO: 32.9 % (ref 35–45)
HGB BLD-MCNC: 10.2 G/DL (ref 12–16)
HGB UR QL STRIP: NEGATIVE
IMM GRANULOCYTES # BLD AUTO: 0 K/UL (ref 0–0.04)
IMM GRANULOCYTES NFR BLD AUTO: 0 % (ref 0–0.5)
KETONES UR QL STRIP.AUTO: NEGATIVE MG/DL
LACTATE SERPL-SCNC: 1 MMOL/L (ref 0.4–2)
LACTATE SERPL-SCNC: 2.3 MMOL/L (ref 0.4–2)
LACTATE SERPL-SCNC: 2.7 MMOL/L (ref 0.4–2)
LEUKOCYTE ESTERASE UR QL STRIP.AUTO: ABNORMAL
LYMPHOCYTES # BLD: 1.7 K/UL (ref 0.9–3.6)
LYMPHOCYTES NFR BLD: 28 % (ref 21–52)
MCH RBC QN AUTO: 31.5 PG (ref 24–34)
MCHC RBC AUTO-ENTMCNC: 31 G/DL (ref 31–37)
MCV RBC AUTO: 101.5 FL (ref 78–100)
MONOCYTES # BLD: 0.7 K/UL (ref 0.05–1.2)
MONOCYTES NFR BLD: 11 % (ref 3–10)
NEUTS SEG # BLD: 3.5 K/UL (ref 1.8–8)
NEUTS SEG NFR BLD: 59 % (ref 40–73)
NITRITE UR QL STRIP.AUTO: NEGATIVE
NRBC # BLD: 0.02 K/UL (ref 0–0.01)
NRBC BLD-RTO: 0.3 PER 100 WBC
PH UR STRIP: 8.5 [PH] (ref 5–8)
PLATELET # BLD AUTO: 324 K/UL (ref 135–420)
PMV BLD AUTO: 9.4 FL (ref 9.2–11.8)
POTASSIUM SERPL-SCNC: 4.3 MMOL/L (ref 3.5–5.5)
PROT UR STRIP-MCNC: 300 MG/DL
RBC # BLD AUTO: 3.24 M/UL (ref 4.2–5.3)
RBC #/AREA URNS HPF: ABNORMAL /HPF (ref 0–2)
RBC #/AREA URNS HPF: NORMAL /HPF
SODIUM SERPL-SCNC: 134 MMOL/L (ref 136–145)
SP GR UR REFRACTOMETRY: 1.01 (ref 1–1.03)
UROBILINOGEN UR QL STRIP.AUTO: 0.2 EU/DL (ref 0.2–1)
WBC # BLD AUTO: 6 K/UL (ref 4.6–13.2)
WBC URNS QL MICRO: ABNORMAL /HPF (ref 0–4)
WBC URNS QL MICRO: NORMAL /HPF

## 2022-10-27 PROCEDURE — 74176 CT ABD & PELVIS W/O CONTRAST: CPT

## 2022-10-27 PROCEDURE — 36415 COLL VENOUS BLD VENIPUNCTURE: CPT

## 2022-10-27 PROCEDURE — 81001 URINALYSIS AUTO W/SCOPE: CPT

## 2022-10-27 PROCEDURE — 74011250637 HC RX REV CODE- 250/637: Performed by: NURSE PRACTITIONER

## 2022-10-27 PROCEDURE — 83605 ASSAY OF LACTIC ACID: CPT

## 2022-10-27 PROCEDURE — 85025 COMPLETE CBC W/AUTO DIFF WBC: CPT

## 2022-10-27 PROCEDURE — G0378 HOSPITAL OBSERVATION PER HR: HCPCS

## 2022-10-27 PROCEDURE — 74011250637 HC RX REV CODE- 250/637: Performed by: INTERNAL MEDICINE

## 2022-10-27 PROCEDURE — 80048 BASIC METABOLIC PNL TOTAL CA: CPT

## 2022-10-27 RX ORDER — SODIUM CHLORIDE 9 MG/ML
25 INJECTION, SOLUTION INTRAVENOUS
Status: DISCONTINUED | OUTPATIENT
Start: 2022-10-27 | End: 2022-10-28 | Stop reason: HOSPADM

## 2022-10-27 RX ORDER — MAG HYDROX/ALUMINUM HYD/SIMETH 200-200-20
30 SUSPENSION, ORAL (FINAL DOSE FORM) ORAL
Status: DISCONTINUED | OUTPATIENT
Start: 2022-10-27 | End: 2022-10-28 | Stop reason: HOSPADM

## 2022-10-27 RX ORDER — ALUMINA, MAGNESIA, AND SIMETHICONE 2400; 2400; 240 MG/30ML; MG/30ML; MG/30ML
15 SUSPENSION ORAL
Status: DISCONTINUED | OUTPATIENT
Start: 2022-10-27 | End: 2022-10-27

## 2022-10-27 RX ORDER — HYDROXYZINE PAMOATE 25 MG/1
25 CAPSULE ORAL
Status: DISCONTINUED | OUTPATIENT
Start: 2022-10-27 | End: 2022-10-27 | Stop reason: SINTOL

## 2022-10-27 RX ORDER — CALCIUM CARBONATE 200(500)MG
400 TABLET,CHEWABLE ORAL ONCE
Status: COMPLETED | OUTPATIENT
Start: 2022-10-27 | End: 2022-10-27

## 2022-10-27 RX ORDER — PROCHLORPERAZINE MALEATE 5 MG
5 TABLET ORAL
Status: DISCONTINUED | OUTPATIENT
Start: 2022-10-27 | End: 2022-10-28 | Stop reason: HOSPADM

## 2022-10-27 RX ADMIN — SUCRALFATE 1 G: 1 TABLET ORAL at 17:49

## 2022-10-27 RX ADMIN — ATORVASTATIN CALCIUM 10 MG: 10 TABLET, FILM COATED ORAL at 21:42

## 2022-10-27 RX ADMIN — APIXABAN 2.5 MG: 2.5 TABLET, FILM COATED ORAL at 09:34

## 2022-10-27 RX ADMIN — PROCHLORPERAZINE MALEATE 5 MG: 5 TABLET ORAL at 21:42

## 2022-10-27 RX ADMIN — APIXABAN 2.5 MG: 2.5 TABLET, FILM COATED ORAL at 17:50

## 2022-10-27 RX ADMIN — SUCRALFATE 1 G: 1 TABLET ORAL at 11:33

## 2022-10-27 RX ADMIN — LEVOTHYROXINE SODIUM 75 MCG: 0.07 TABLET ORAL at 06:30

## 2022-10-27 RX ADMIN — CINACALCET HYDROCHLORIDE 30 MG: 30 TABLET, FILM COATED ORAL at 09:34

## 2022-10-27 RX ADMIN — MONTELUKAST 10 MG: 10 TABLET, FILM COATED ORAL at 09:34

## 2022-10-27 RX ADMIN — CALCIUM CARBONATE 400 MG: 500 TABLET, CHEWABLE ORAL at 12:35

## 2022-10-27 RX ADMIN — CLOPIDOGREL BISULFATE 75 MG: 75 TABLET ORAL at 09:34

## 2022-10-27 RX ADMIN — LOSARTAN POTASSIUM 50 MG: 25 TABLET, FILM COATED ORAL at 09:34

## 2022-10-27 RX ADMIN — SEVELAMER CARBONATE 800 MG: 800 TABLET, FILM COATED ORAL at 21:42

## 2022-10-27 RX ADMIN — CARVEDILOL 25 MG: 12.5 TABLET, FILM COATED ORAL at 17:49

## 2022-10-27 RX ADMIN — MULTIPLE VITAMINS W/ MINERALS TAB 1 TABLET: TAB at 09:56

## 2022-10-27 RX ADMIN — SEVELAMER CARBONATE 800 MG: 800 TABLET, FILM COATED ORAL at 09:56

## 2022-10-27 RX ADMIN — CARVEDILOL 25 MG: 12.5 TABLET, FILM COATED ORAL at 09:56

## 2022-10-27 RX ADMIN — AMLODIPINE BESYLATE 2.5 MG: 2.5 TABLET ORAL at 09:35

## 2022-10-27 RX ADMIN — SUCRALFATE 1 G: 1 TABLET ORAL at 07:30

## 2022-10-27 RX ADMIN — SUCRALFATE 1 G: 1 TABLET ORAL at 21:42

## 2022-10-27 RX ADMIN — ACETAMINOPHEN 650 MG: 325 TABLET ORAL at 15:44

## 2022-10-27 RX ADMIN — AMIODARONE HYDROCHLORIDE 200 MG: 200 TABLET ORAL at 09:35

## 2022-10-27 RX ADMIN — SEVELAMER CARBONATE 800 MG: 800 TABLET, FILM COATED ORAL at 14:41

## 2022-10-27 NOTE — PROGRESS NOTES
Comprehensive Nutrition Assessment    Type and Reason for Visit: Initial    Nutrition Recommendations/Plan:   Cardiac diet  Nepro BID     Malnutrition Assessment:  Malnutrition Status:  Insufficient data (C-diff rule out) (10/27/22 4676)    Context:  Acute illness     Findings of the 6 clinical characteristics of malnutrition:   Energy Intake:  Mild decrease in energy intake (specify) (indigestion and diarrhea)  Weight Loss:  No significant weight loss     Body Fat Loss:  Unable to assess,     Muscle Mass Loss:  Unable to assess,    Fluid Accumulation:  Unable to assess,     Strength:  Not performed     Nutrition Assessment:    75 yo female PMH: ESRD with HD, HTN, hypothyroidism, A-fib, GERD, anemia, COPD, HLD, hypothyroidism    Nutrition Related Findings:    Overweight BMI 27.9 at 147 lbs. Pt with ESRD and HD missed HD on monday due to diarrhea. Has been having diarrhea on and off since Monday and recently having 10 episodes yesterday as cause of visit to ED. Pt currently is C-diff rule out. Suprisingly pt K+ is still WNL at 4.3 considering amount of diarrhea pt has had. Currenlty on cardiac diet. Pt having indigestion as well so reduced appetite recommend Nepro BID due to increased needs related to HD and persistant diarrhea.  Wound Type: None    Recent Results (from the past 24 hour(s))   GLUCOSE, POC    Collection Time: 10/26/22  4:13 PM   Result Value Ref Range    Glucose (POC) 139 (H) 70 - 110 mg/dL    Performed by Dar Branham    METABOLIC PANEL, BASIC    Collection Time: 10/27/22  5:35 AM   Result Value Ref Range    Sodium 134 (L) 136 - 145 mmol/L    Potassium 4.3 3.5 - 5.5 mmol/L    Chloride 98 (L) 100 - 111 mmol/L    CO2 31 21 - 32 mmol/L    Anion gap 5 3.0 - 18.0 mmol/L    Glucose 150 (H) 74 - 99 mg/dL    BUN 21 (H) 7 - 18 mg/dL    Creatinine 5.71 (H) 0.60 - 1.30 mg/dL    BUN/Creatinine ratio 4 (L) 12 - 20      eGFR 8 (L) >60 ml/min/1.73m2    Calcium 7.7 (L) 8.5 - 10.1 mg/dL   CBC WITH AUTOMATED DIFF    Collection Time: 10/27/22  5:35 AM   Result Value Ref Range    WBC 6.0 4.6 - 13.2 K/uL    RBC 3.24 (L) 4.20 - 5.30 M/uL    HGB 10.2 (L) 12.0 - 16.0 g/dL    HCT 32.9 (L) 35.0 - 45.0 %    .5 (H) 78.0 - 100.0 FL    MCH 31.5 24.0 - 34.0 PG    MCHC 31.0 31.0 - 37.0 g/dL    RDW 16.0 (H) 11.6 - 14.5 %    PLATELET 317 198 - 190 K/uL    MPV 9.4 9.2 - 11.8 FL    NRBC 0.3 (H) 0.0  WBC    ABSOLUTE NRBC 0.02 (H) 0.00 - 0.01 K/uL    NEUTROPHILS 59 40 - 73 %    LYMPHOCYTES 28 21 - 52 %    MONOCYTES 11 (H) 3 - 10 %    EOSINOPHILS 1 0 - 5 %    BASOPHILS 1 0 - 2 %    IMMATURE GRANULOCYTES 0 0 - 0.5 %    ABS. NEUTROPHILS 3.5 1.8 - 8.0 K/UL    ABS. LYMPHOCYTES 1.7 0.9 - 3.6 K/UL    ABS. MONOCYTES 0.7 0.05 - 1.2 K/UL    ABS. EOSINOPHILS 0.0 0.0 - 0.4 K/UL    ABS. BASOPHILS 0.0 0.0 - 0.1 K/UL    ABS. IMM.  GRANS. 0.0 0.00 - 0.04 K/UL    DF AUTOMATED     LACTIC ACID    Collection Time: 10/27/22  5:35 AM   Result Value Ref Range    Lactic acid 2.3 (HH) 0.4 - 2.0 mmol/L   LACTIC ACID    Collection Time: 10/27/22 10:45 AM   Result Value Ref Range    Lactic acid 2.7 (HH) 0.4 - 2.0 mmol/L   URINALYSIS W/ RFLX MICROSCOPIC    Collection Time: 10/27/22  1:00 PM   Result Value Ref Range    Color Yellow      Appearance Cloudy      Specific gravity 1.010 1.005 - 1.030      pH (UA) 8.5 (H) 5.0 - 8.0      Protein 300 (A) Negative mg/dL    Glucose Negative Negative mg/dL    Ketone Negative Negative mg/dL    Bilirubin Negative Negative      Blood Negative Negative      Urobilinogen 0.2 0.2 - 1.0 EU/dL    Nitrites Negative Negative      Leukocyte Esterase Trace (A) Negative      WBC 5-10 0 - 4 /hpf    RBC 0-5 0 - 2 /hpf    Epithelial cells Many 0 - 20 /lpf    Bacteria 2+ (A) None /hpf   URINE MICROSCOPIC    Collection Time: 10/27/22  1:00 PM   Result Value Ref Range    WBC PENDING /hpf    RBC PENDING /hpf    Epithelial cells Many 0 - 20 /lpf    Bacteria PENDING /hpf        Current Nutrition Intake & Therapies:  Average Meal Intake: 26-50%  Average Supplement Intake: None ordered  ADULT DIET Regular; Low Fat/Low Chol/High Fiber/KAL    Anthropometric Measures:  Height: 5' 1\" (154.9 cm)  Ideal Body Weight (IBW): 105 lbs (48 kg)  Admission Body Weight: 147 lb  Current Body Wt:  66.7 kg (147 lb), 140 % IBW. Bed scale  Current BMI (kg/m2): 27.8        Weight Adjustment: No adjustment                 BMI Category: Overweight (BMI 25.0-29. 9)    Estimated Daily Nutrient Needs:  Energy Requirements Based On: Kcal/kg (20-25 kcal/kg)  Weight Used for Energy Requirements: Admission (67 kg)  Energy (kcal/day): 6555-2930 kcal/day  Weight Used for Protein Requirements: Admission (1-1.2 g/kg)  Protein (g/day): 67-80 g/day  Method Used for Fluid Requirements: Standard renal  Fluid (ml/day): 1000 mL plus urine output/day    Nutrition Diagnosis:   Predicted inadequate energy intake, Increased nutrient needs related to altered GI function, renal dysfunction as evidenced by diarrhea, other (specify) (ESRD with HD)    Nutrition Interventions:   Food and/or Nutrient Delivery: Continue current diet, Start oral nutrition supplement  Nutrition Education/Counseling: Education not indicated  Coordination of Nutrition Care: Continue to monitor while inpatient       Goals:     Goals: PO intake 75% or greater, by next RD assessment       Nutrition Monitoring and Evaluation:      Food/Nutrient Intake Outcomes: Food and nutrient intake, Supplement intake  Physical Signs/Symptoms Outcomes: Biochemical data, Meal time behavior, Weight    Follow up 10/30/2022    Discharge Planning:    Continue current diet, Continue oral nutrition supplement    [address]: [place] [phone]

## 2022-10-27 NOTE — PROGRESS NOTES
Care Management Interventions  PCP Verified by CM: Yes  Palliative Care Criteria Met (RRAT>21 & CHF Dx)?: No  Mode of Transport at Discharge: Other (see comment) (Family)  Transition of Care Consult (CM Consult): Discharge Planning  MyChart Signup: No  Discharge Durable Medical Equipment: No  Health Maintenance Reviewed: Yes  Physical Therapy Consult: Yes  Occupational Therapy Consult: No  Speech Therapy Consult: No  Support Systems: Other Family Member(s), Child(lilibeth)  Confirm Follow Up Transport: Family  The Patient and/or Patient Representative was Provided with a Choice of Provider and Agrees with the Discharge Plan?: Yes  Freedom of Choice List was Provided with Basic Dialogue that Supports the Patient's Individualized Plan of Care/Goals, Treatment Preferences and Shares the Quality Data Associated with the Providers?: Yes  Discharge Location  Patient Expects to be Discharged to[de-identified] Home  Reason for Admission: Chart reviewed and noted patient presents with C/O diarrhea in the last 3 days. Pt states she has had 10 episodes of diarrhea so far today. Stool is non-bloody, and loose. She states she has had diarrhea off and on since Monday 11/24. Pt states she has had mild abdominal cramping. She missed her scheduled HD last Monday due to diarrhea.      Dx: Diarrhea, ESRD Needing Hemodialysis     PMH: ESRD/HD, HTN, Hypothyroidism, Atrial Fibrillation, GERD                      RUR Score: NA                    Plan for utilizing home health: TBD         PCP: First and Last name:  Nohemi Summers MD     Name of Practice:    Are you a current patient: Yes/No:    Approximate date of last visit:    Can you participate in a virtual visit with your PCP:                     Current Advanced Directive/Advance Care Plan: Full Code- No ACP      Healthcare Decision Maker: Chas Jacobson [facility]- [phone]   Click here to complete [employer] including selection of the Healthcare Decision Maker Relationship (ie \"Primary\")             Primary Decision Maker: Anne Genevieve - Daughter - [phone]                  Transition of Care Plan: Patient lives at home with her sister. She has a Nebulizer Machine that she uses. Teach back and medication reconciliation will be completed. Nurse will make follow-up appointments. Patient will be returning back home with her sister at discharge.

## 2022-10-27 NOTE — PROGRESS NOTES
Vital signs assessed. Provider notified of /51. Request for PRN heartburn medication. 2147 - HS medications administered. [place] and soda provided. 0100 - Vital signs assessed. 4329 - Phlebotomist unable to collect AM labs. RN attempted and also unable to collect AM labs. Provider notified. PRN Tylenol administered for headache PRN compazine administered for nausea. 0630 - scheduled medications administered.

## 2022-10-27 NOTE — PROGRESS NOTES
Problem: Pain  Goal: *Control of Pain  Outcome: Progressing Towards Goal  Goal: *PALLIATIVE CARE:  Alleviation of Pain  Outcome: Progressing Towards Goal     Problem: Falls - Risk of  Goal: *Absence of Falls  Description: Document Coleen Fall Risk and appropriate interventions in the flowsheet.   Outcome: Progressing Towards Goal  Note: Fall Risk Interventions:  Mobility Interventions: Patient to call before getting OOB         Medication Interventions: Patient to call before getting OOB, Teach patient to arise slowly    Elimination Interventions: Call light in reach              Problem: Chronic Renal Failure  Goal: *Fluid and electrolytes stabilized  Outcome: Progressing Towards Goal

## 2022-10-27 NOTE — PROGRESS NOTES
@2005 Received pt via bed from dialysis. @2011 PM Assessment completed. Pt assisted into gown. A&OX3. Lungs clear. Skin warm and dry. Pt denies any complaints at present. CBWR, bed in lowest position. @0230 Pt up to BR with minium assist.  Pt voided moderate amount of urine. Pt back to bed. NAD noted. @7326 Bedside shift report given to oncoming nurse.

## 2022-10-27 NOTE — PROGRESS NOTES
Patient alert and oriented x4. Breath sounds cta on RA, sats %. Vitals wnl. Patient with c/o indigestion. MD notified-new meds administered. No Bm since 10/26 a.m. patient states she took Imodium prior to coming to ED. Urine specimen collected and sent to lab. No c/o pain or sob. Report given to Marc Mirza RN all questions answered.

## 2022-10-27 NOTE — PROGRESS NOTES
Orders received for P.T. pt states she is at her baseline function and does not require P.T. at this time. She agrees to discontinue order.   Tam Goldstein, PT, DPT

## 2022-10-27 NOTE — PROGRESS NOTES
Hospitalist Progress Note    Daily Progress Note:  5:15 PM      Hudson Velásquez                                            MRN: [id number]                                  [id number]      Subjective:     Pt examined and seen at bedside. Patient is alert and oriented x 4, there is no acute distress noted. Patient states she is feeling better, slightly weak, but overall better. She denies fever, chills, chest pain, shortness of breath, patient does endorses abdominal discomfort and indigestion. No acute events reported overnight. Labs and Vitals: Lactic acid trending up, continue to trend. Repeat BMP in the am.     Objective:     Visit Vitals  BP (!) 113/54 (BP 1 Location: Right upper arm, BP Patient Position: At rest)   Pulse 76   Temp 97.4 °F (36.3 °C)   Resp 18   Ht 5' 1\" (1.549 m)   Wt 67.1 kg (147 lb 14.9 oz)   SpO2 99%   BMI 27.95 kg/m²      O2 Device: None (Room air)    Temp (24hrs), Av.9 °F (36.6 °C), Min:97.4 °F (36.3 °C), Max:98.5 °F (36.9 °C)      10/27 0701 - 10/27 1900  In: 240 [P.O.:240]  Out: -   10/25 1901 - 10/27 0700  In: 580 [P.O.:580]  Out:      PHYSICAL EXAM:  Physical Exam  Vitals and nursing note reviewed. Constitutional:       Appearance: Normal appearance. She is normal weight. HENT:      Head: Normocephalic and atraumatic. Mouth/Throat:      Mouth: Mucous membranes are moist.   Eyes:      Extraocular Movements: Extraocular movements intact. Pupils: Pupils are equal, round, and reactive to light. Cardiovascular:      Rate and Rhythm: Normal rate and regular rhythm. Pulses: Normal pulses. Heart sounds: Normal heart sounds. Pulmonary:      Effort: Pulmonary effort is normal.      Breath sounds: Normal breath sounds. Abdominal:      General: Abdomen is flat. Bowel sounds are normal.      Palpations: Abdomen is soft. Musculoskeletal:      Cervical back: Normal range of motion and neck supple. Skin:     General: Skin is warm and dry. Capillary Refill: Capillary refill takes less than 2 seconds. Neurological:      General: No focal deficit present. Mental Status: She is alert and oriented to person, place, and time.    Psychiatric:         Mood and Affect: Mood normal.     Current Facility-Administered Medications   Medication Dose Route Frequency    0.9% sodium chloride infusion  25 mL/hr IntraVENous DIALYSIS PRN    acetaminophen (TYLENOL) tablet 650 mg  650 mg Oral Q6H PRN    amiodarone (CORDARONE) tablet 200 mg  200 mg Oral DAILY    amLODIPine (NORVASC) tablet 2.5 mg  2.5 mg Oral DAILY    apixaban (ELIQUIS) tablet 2.5 mg  2.5 mg Oral BID    calcitRIOL (ROCALTROL) capsule 0.5 mcg  0.5 mcg Oral See Admin Instructions    carvediloL (COREG) tablet 25 mg  25 mg Oral BID WITH MEALS    cinacalcet (SENSIPAR) tablet 30 mg  30 mg Oral DAILY    clopidogreL (PLAVIX) tablet 75 mg  75 mg Oral DAILY    valGANciclovir (VALCYTE) tablet 900 mg- PATIENT MUST SUPPLY (Patient Supplied)  900 mg Oral DAILY    sucralfate (CARAFATE) tablet 1 g  1 g Oral AC&HS    atorvastatin (LIPITOR) tablet 10 mg  10 mg Oral QHS    sevelamer carbonate (RENVELA) tab 800 mg  800 mg Oral TID    multivitamin, tx-iron-ca-min (THERA-M w/ IRON) tablet 1 Tablet  1 Tablet Oral DAILY    montelukast (SINGULAIR) tablet 10 mg  10 mg Oral DAILY    losartan (COZAAR) tablet 50 mg  50 mg Oral DAILY    levothyroxine (SYNTHROID) tablet 75 mcg  75 mcg Oral ACB    fluticasone propionate (FLONASE) 50 mcg/actuation nasal spray 1 Spray  1 Spray Both Nostrils DAILY    [Held by provider] cranberry extract tablet 450 mg  450 mg Oral DAILY        Assessment/Plan:     Diarrhea  -no stools since admission, patient continues to complain of abdomen discomfort and indigestion, ordered Compazine for nausea and Maalox for indigestion  -CT abd/pelvis without contrast to rule out GI inflammatory process  -enteric precautions in place, pending discharge  -awaiting stool cultures  -there is no leukocytosis and patient denies fever    ESRD needing hemodialysis:  -Hx of failed renal transplant (2013)  -patient received HD on Wednesday due to her missing HD on Monday, nephrologist consulted  -will continue patient regular schedule of HD for MF  -BMP in the am    Lactic Acidosis  -increasing, could be secondary to renal failure  -CT abd ordered to rule out inflammatory/infectious processes    Hypertension  -Chronic, controlled  -continue Coreg, Lisinopril, and Cozaar  -monitor BP closely     Hypothyroidism  -chronic, continue Synthroid     Anemia of Chronic Disease  -HH: 10.2/32.9, at baseline  -CBC in the am     Paroxysmal atrial fibrillation:  -history of atrial fibrillation, currently SR  -continue Amiodarone and ELiquis     History of cytomegalovirus  -Continue valacyclovir.     DVT Prophylaxis: Eliquis    Code Status:Full    Care Plan discussed with: Patient and Nursing    Clinical time 25 minutes with >50% of visit spent in counseling and coordination of care    Signed by: VLADIMIR Ruvalcaba 10/27/2022

## 2022-10-27 NOTE — PROGRESS NOTES
Patient received dialysis yesterday and should run again Friday orders placed in case she is not discharged

## 2022-10-27 NOTE — PROGRESS NOTES
The [facility] formulary substitution for renaplex-d is multivitamin with minerals.

## 2022-10-27 NOTE — PROGRESS NOTES
Problem: Pain  Goal: *Control of Pain  Outcome: Progressing Towards Goal     Problem: Falls - Risk of  Goal: *Absence of Falls  Description: Document Coleen Fall Risk and appropriate interventions in the flowsheet. Outcome: Progressing Towards Goal  Note: Fall Risk Interventions:            Medication Interventions: Patient to call before getting OOB                   Problem: Pressure Injury - Risk of  Goal: *Prevention of pressure injury  Description: Document Quinn Scale and appropriate interventions in the flowsheet. Outcome: Progressing Towards Goal  Note: Pressure Injury Interventions:  Sensory Interventions: Minimize linen layers    Moisture Interventions: Minimize layers              Nutrition Interventions: Document food/fluid/supplement intake    Friction and Shear Interventions: Minimize layers                Problem: Risk for Spread of Infection  Goal: Prevent transmission of infectious organism to others  Description: Prevent the transmission of infectious organisms to other patients, staff members, and visitors.   Outcome: Progressing Towards Goal     Problem: Chronic Renal Failure  Goal: *Fluid and electrolytes stabilized  Outcome: Progressing Towards Goal

## 2022-10-28 VITALS
HEIGHT: 61 IN | OXYGEN SATURATION: 100 % | TEMPERATURE: 98 F | WEIGHT: 141.54 LBS | DIASTOLIC BLOOD PRESSURE: 54 MMHG | SYSTOLIC BLOOD PRESSURE: 135 MMHG | BODY MASS INDEX: 26.72 KG/M2 | RESPIRATION RATE: 20 BRPM | HEART RATE: 77 BPM

## 2022-10-28 PROCEDURE — 74011250637 HC RX REV CODE- 250/637: Performed by: NURSE PRACTITIONER

## 2022-10-28 PROCEDURE — G0257 UNSCHED DIALYSIS ESRD PT HOS: HCPCS

## 2022-10-28 PROCEDURE — 90935 HEMODIALYSIS ONE EVALUATION: CPT

## 2022-10-28 PROCEDURE — G0378 HOSPITAL OBSERVATION PER HR: HCPCS

## 2022-10-28 RX ADMIN — ACETAMINOPHEN 650 MG: 325 TABLET ORAL at 03:44

## 2022-10-28 RX ADMIN — PROCHLORPERAZINE MALEATE 5 MG: 5 TABLET ORAL at 03:44

## 2022-10-28 RX ADMIN — LEVOTHYROXINE SODIUM 75 MCG: 0.07 TABLET ORAL at 06:33

## 2022-10-28 RX ADMIN — SUCRALFATE 1 G: 1 TABLET ORAL at 06:33

## 2022-10-28 NOTE — DISCHARGE SUMMARY
Discharge Summary       PATIENT ID: Alden Tejeda  MRN: [id number]   YOB: 1955    DATE OF ADMISSION: 10/26/2022 12:56 PM    DATE OF DISCHARGE: 10/28/22    PRIMARY CARE PROVIDER: Kory Enamorado MD     ATTENDING PHYSICIAN: Parviz Brenner MD  DISCHARGING PROVIDER: Parviz Brenner MD        CONSULTATIONS: IP CONSULT TO NEPHROLOGY    PROCEDURES/SURGERIES: * No surgery found *    ADMITTING [address] [place] [address] COURSE:   Alden Tejeda is a 74 y.o. female with past medical history significant for ESRD/HD patient of Dr. Whalen--( cycle), HTN, hypothyroidism, atrial fibrillation, GERD, who presents to the ED today with complaints of diarrhea in the last 3 days. At the time of my evaluation, patient was receiving hemodialysis, she appears somewhat somnolent/lethargic, minimally responsive to questions hence ROS is limited. She however endorsed 10 episodes of diarrhea so far today. Stool is non-bloody, and loose. States diarrhea has been off and on since Monday. No fever or chills. No chest pain, shortness of breath, nausea or vomiting. States to mild abdominal cramping. No other complaints verbalized. Patient endorsed missing scheduled HD last Monday due to diarrhea. Hospitalist was asked to admit.         DISCHARGE DIAGNOSES / PLAN:      Assessment/Plan:      Diarrhea  -no stools since admission, patient continues to complain of abdomen discomfort and indigestion, ordered Compazine for nausea and Maalox for indigestion  -CT abd/pelvis without contrast to rule out GI inflammatory process  -enteric precautions in place, pending discharge  -awaiting stool cultures  -there is no leukocytosis and patient denies fever     ESRD needing hemodialysis:  -Hx of failed renal transplant (2013)  -patient received HD on Wednesday due to her missing HD on Monday, nephrologist consulted  -will continue patient regular schedule of HD for   -BMP in the am     Lactic Acidosis  -increasing, could be secondary to renal failure  -CT abd ordered to rule out inflammatory/infectious processes     Hypertension  -Chronic, controlled  -continue Coreg, Lisinopril, and Cozaar  -monitor BP closely     Hypothyroidism  -chronic, continue Synthroid     Anemia of Chronic Disease  -HH: 10.2/32.9, at baseline  -CBC in the am     Paroxysmal atrial fibrillation:  -history of atrial fibrillation, currently SR  -continue Amiodarone and ELiquis     History of cytomegalovirus  -Continue valacyclovir. DVT Prophylaxis: Eliquis      Day of dc  Pt feels fine but laundry list of social issues which prevent her from getting to her outpt HD appt. Will have hd here and then leave. Lactic acid    FOLLOW UP APPOINTMENTS:    Follow-up Information       Follow up With Specialties Details Why [address], [place] [address]  [phone]                 DIET: Renal Diet        DISCHARGE MEDICATIONS:  Current Discharge Medication List        CONTINUE these medications which have NOT CHANGED    Details   fluticasone-umeclidinium-vilanterol (Trelegy Ellipta) 100-62.5-25 mcg inhaler Take 1 Puff by inhalation daily. Please call  and schedule an appointment with new PCP for future refills. Qty: 60 Each, Refills: 0  Start date: 10/26/2022      fluticasone propionate (FLONASE) 50 mcg/actuation nasal spray USE 1 SPRAY IN EACH NOSTRIL EVERY MORNING  Qty: 16 g, Refills: 2      Dexilant 60 mg CpDB capsule (delayed release) TAKE 1 CAPSULE BY MOUTH IN THE MORNING. Qty: 90 Capsule, Refills: 0    Associated Diagnoses: Gastroesophageal reflux disease with esophagitis without hemorrhage      diclofenac (VOLTAREN) 1 % gel Apply  to affected area four (4) times daily. Qty: 200 g, Refills: 1    Comments: Has tolerated a similar medication in the past and allergy history is unreliable.       meclizine (ANTIVERT) 25 mg tablet TAKE 1 TABLET THREE TIMES DAILY AS NEEDED FOR DIZZINESS  Qty: 90 Tablet, Refills: 0    Associated Diagnoses: Stenosis of left vertebral artery      ondansetron (ZOFRAN ODT) 4 mg disintegrating tablet Take 2 Tablets by mouth every eight (8) hours as needed for Nausea or Nausea or Vomiting. Qty: 15 Tablet, Refills: 0      polyethylene glycol (Miralax) 17 gram packet Take 1 Packet by mouth two (2) times a day. Qty: 60 Each, Refills: 2      acetaminophen-codeine (TYLENOL #3) 300-30 mg per tablet TAKE 1 TABLET BY MOUTH EVERY 4 HOURS FOR UP TO 5 DAYS AS NEEDED      doxercalciferoL (HECTOROL) 4 mcg/2 mL injection 6 mcg by IntraVENous route. epoetin jalen (EPOGEN;PROCRIT) 10,000 unit/mL injection 10,000 Units by SubCUTAneous route. cinacalcet (SENSIPAR) 30 mg tablet Take 30 mg by mouth daily. clopidogreL (PLAVIX) 75 mg tab Take 75 mg by mouth daily. losartan (COZAAR) 50 mg tablet Take 1 Tablet by mouth daily. Qty: 90 Tablet, Refills: 1    Associated Diagnoses: Hypertension, unspecified type      cyclobenzaprine (FLEXERIL) 5 mg tablet Take 1 Tablet by mouth three (3) times daily as needed for Muscle Spasm(s). Qty: 180 Tablet, Refills: 0      amiodarone (CORDARONE) 200 mg tablet TAKE 1 TABLET BY MOUTH EVERY DAY  Qty: 90 Tablet, Refills: 1      amLODIPine (NORVASC) 10 mg tablet Take 1 tablet by mouth once daily  Qty: 90 Tablet, Refills: 1    Associated Diagnoses: Hypertension, unspecified type      carvediloL (COREG) 25 mg tablet Take 1 Tablet by mouth two (2) times daily (with meals). Qty: 180 Tablet, Refills: 1      levothyroxine (SYNTHROID) 75 mcg tablet Take 1 Tablet by mouth Daily (before breakfast). Qty: 90 Tablet, Refills: 1      simvastatin (ZOCOR) 20 mg tablet TAKE 1 TABLET BY MOUTH DAILY AS DIRECTED. Qty: 90 Tablet, Refills: 1      valGANciclovir (VALCYTE) 450 mg tablet Take 2 Tablets by mouth in the morning.   Qty: 180 Tablet, Refills: 1    Associated Diagnoses: ESRD (end stage renal disease) on dialysis (HCC)      dicyclomine (BENTYL) 10 mg capsule Take 1 Capsule by mouth in the morning. Qty: 90 Capsule, Refills: 1      montelukast (SINGULAIR) 10 mg tablet Take 1 Tablet by mouth in the morning. Qty: 90 Tablet, Refills: 1      hyoscyamine SL (LEVSIN/SL) 0.125 mg SL tablet 1 Tablet by SubLINGual route every four (4) hours as needed (irritable bowel). Qty: 30 Tablet, Refills: 2    Associated Diagnoses: Gastroesophageal reflux disease with esophagitis without hemorrhage      sucralfate (CARAFATE) 1 gram tablet TAKE 1 TABLET BY MOUTH FOUR TIMES DAILY  Qty: 360 Tablet, Refills: 1      apixaban (ELIQUIS) 2.5 mg tablet Take 1 Tablet by mouth two (2) times a day. Qty: 180 Tablet, Refills: 1      zolpidem (AMBIEN) 5 mg tablet Take 1 Tablet by mouth nightly as needed for Sleep. Max Daily Amount: 5 mg. Qty: 30 Tablet, Refills: 0    Associated Diagnoses: Primary insomnia      predniSONE (DELTASONE) 5 mg tablet Take 1 Tablet by mouth daily. albuterol (PROVENTIL VENTOLIN) 2.5 mg /3 mL (0.083 %) nebu USE 1 VIAL VIA NEBULIZER THREE TIMES DAILY  Qty: 90 mL, Refills: 3    Associated Diagnoses: Mild intermittent asthma without complication      calcitRIOL (ROCALTROL) 0.5 mcg capsule Take 0.5 mcg by mouth See Admin Instructions. Take on non dialysis days (Tues, Wed, Thur, Sat, Sun)  Dialysis is on Monday and Friday      RenaPlex-D 800 mcg-12.5 mg -2,000 unit tab Take 1 Tablet by mouth daily. sevelamer carbonate (RENVELA) 800 mg tab tab Take 800 mg by mouth three (3) times daily. aluminum & magnesium hydroxide-simethicone (Maalox Maximum Strength) 400-400-40 mg/5 mL suspension Take 10 mL by mouth every six (6) hours as needed for Indigestion. Qty: 250 mL, Refills: 0      ESTRACE 0.01 % (0.1 mg/gram) vaginal cream INSERT 2 GRAMS INTO VAGINA EVERY MONDAY AND THURSDAY  Qty: 42.5 g, Refills: 5      cranberry extract 425 mg cap Take 425 mg by mouth daily.       EPINEPHrine (EPIPEN) 0.3 mg/0.3 mL injection INJECT 0.3 ML INTRAMUSCULARLY ONCE AS NEEDED FOR ALLERGIC RESPONSE  Qty: 1 Each, Refills: 1               NOTIFY YOUR PHYSICIAN FOR ANY OF THE FOLLOWING:   Fever over 101 degrees for 24 hours. Chest pain, shortness of breath, fever, chills, nausea, vomiting, diarrhea, change in mentation, falling, weakness, bleeding. Severe pain or pain not relieved by medications. Or, any other signs or symptoms that you may have questions about. DISPOSITION: home    Home With:   OT  PT  HH  RN       Long term SNF/Inpatient Rehab    Independent/assisted living    Hospice    Other:       PATIENT CONDITION AT DISCHARGE:     Functional status    Poor    x Deconditioned     Independent      Cognition   x  Lucid     Forgetful     Dementia      Catheters/lines (plus indication)    Jackson     PICC     PEG    x None      Code status    x Full code     DNR      Physical Exam  Vitals and nursing note reviewed. Constitutional:       Appearance: Normal appearance. She is normal weight. HENT:      Head: Normocephalic and atraumatic. Mouth/Throat:      Mouth: Mucous membranes are moist.   Eyes:      Extraocular Movements: Extraocular movements intact. Pupils: Pupils are equal, round, and reactive to light. Cardiovascular:      Rate and Rhythm: Normal rate and regular rhythm. Pulses: Normal pulses. Heart sounds: Normal heart sounds. Pulmonary:      Effort: Pulmonary effort is normal.      Breath sounds: Normal breath sounds. Abdominal:      General: Abdomen is flat. Bowel sounds are normal.      Palpations: Abdomen is soft. Musculoskeletal:      Cervical back: Normal range of motion and neck supple. Skin:     General: Skin is warm and dry. Capillary Refill: Capillary refill takes less than 2 seconds. Neurological:      General: No focal deficit present. Mental Status: She is alert and oriented to person, place, and time.    Psychiatric:         Mood and Affect: Mood normal.       CHRONIC MEDICAL DIAGNOSES:  Problem List as of 10/28/2022 Date Reviewed: 5/26/2022 Codes Class Noted - Resolved    Acute colitis ICD-10-CM: K52.9  ICD-9-CM: 558.9  9/12/2022 - Present        Moderate protein-calorie malnutrition ([facility] ICD-10-CM: E44.0  ICD-9-CM: 263.0  9/11/2022 - Present        Colitis ICD-10-CM: K52.9  ICD-9-CM: 558.9  9/10/2022 - Present        Intractable nausea and vomiting ICD-10-CM: R11.2  ICD-9-CM: 536.2  9/10/2022 - Present        Diverticulitis ICD-10-CM: K57.92  ICD-9-CM: 562.11  8/28/2022 - Present        Pancreatitis ICD-10-CM: K85.90  ICD-9-CM: 671.1  8/28/2022 - Present        Segmental colitis associated with diverticulosis ([facility] ICD-10-CM: K50.10, K57.30  ICD-9-CM: 555.1, 562.10  7/19/2022 - Present        Stenosis of left vertebral artery ICD-10-CM: I65.02  ICD-9-CM: 433.20  4/12/2022 - Present        Nausea ICD-10-CM: R11.0  ICD-9-CM: 787.02  4/7/2022 - Present        Weakness ICD-10-CM: R53.1  ICD-9-CM: 780.79  4/7/2022 - Present        Fluid overload ICD-10-CM: E87.70  ICD-9-CM: 276.69  2/2/2022 - Present        Atypical chest pain ICD-10-CM: R07.89  ICD-9-CM: 786.59  12/24/2021 - Present        ESRD needing dialysis ([facility] ICD-10-CM: N18.6, Z99.2  ICD-9-CM: 585.6  12/24/2021 - Present        Mild persistent asthma with (acute) exacerbation ICD-10-CM: J45.31  ICD-9-CM: 493.92  12/24/2021 - Present        Intractable vomiting ICD-10-CM: R11.10  ICD-9-CM: 536.2  8/9/2021 - Present        Left leg pain ICD-10-CM: M79.605  ICD-9-CM: 729.5  7/14/2021 - Present        Acute hyperkalemia ICD-10-CM: E87.5  ICD-9-CM: 276.7  6/12/2021 - Present        Encounter for cholecystectomy ICD-10-CM: Z76.89  ICD-9-CM: V65.8  5/6/2021 - Present    Overview Signed 5/6/2021  9:13 AM by Doc Ch MD     7/2020             Acute left-sided low back pain without sciatica ICD-10-CM: M54.50  ICD-9-CM: 724.2  5/6/2021 - Present        Atrial fibrillation ([address] ICD-10-CM: I48.91  ICD-9-CM: 427.31  5/6/2021 - Present        Chronic anticoagulation ICD-10-CM: Z79.01  ICD-9-CM: V58.61 5/6/2021 - Present        Stomatitis ICD-10-CM: K12.1  ICD-9-CM: 528.00  3/2/2021 - Present        Thrush of mouth and esophagus (HCC) ICD-10-CM: B37.81, B37.0  ICD-9-CM: 112.84, 112.0  3/2/2021 - Present        ESRD (end stage renal disease) on dialysis [facility]) ICD-10-CM: N18.6, Z99.2  ICD-9-CM: 585.6, V45.11  12/28/2020 - Present        History of DVT (deep vein thrombosis) ICD-10-CM: V34.359  ICD-9-CM: V12.51  12/28/2020 - Present        Hypothyroidism ICD-10-CM: E03.9  ICD-9-CM: 244.9  12/24/2020 - Present        Vertigo ICD-10-CM: R42  ICD-9-CM: 780.4  12/24/2020 - Present        Calculus of gallbladder with acute cholecystitis without obstruction ICD-10-CM: K80.00  ICD-9-CM: 574.00  10/9/2020 - Present        Acute recurrent maxillary sinusitis ICD-10-CM: J01.01  ICD-9-CM: 461.0  8/6/2020 - Present        Ulcer ICD-10-CM: IUY1434  ICD-9-CM: 707.9  Unknown - Present        Obesity ICD-10-CM: E66.9  ICD-9-CM: 278.00  Unknown - Present        Migraine ICD-10-CM: J26.901  ICD-9-CM: 346.90  Unknown - Present        Hypertension ICD-10-CM: I10  ICD-9-CM: 401.9  Unknown - Present        Hypercholesteremia ICD-10-CM: E78.00  ICD-9-CM: 272.0  Unknown - Present        GERD (gastroesophageal reflux disease) ICD-10-CM: K21.9  ICD-9-CM: 530.81  Unknown - Present        Burning with urination ICD-10-CM: R30.0  ICD-9-CM: 788.1  Unknown - Present    Overview Signed 2/13/2017 12:02 PM by Harriett Dunn A     frequent uti             Arrhythmia ICD-10-CM: I49.9  ICD-9-CM: 427.9  Unknown - Present        Hyperkalemia ICD-10-CM: E87.5  ICD-9-CM: 276.7  11/13/2016 - Present        Pruritus ICD-10-CM: L29.9  ICD-9-CM: 698.9  9/29/2016 - Present        Chronic kidney disease, stage III (moderate) (HCC) ICD-10-CM: N18.30  ICD-9-CM: 585.3  9/27/2016 - Present        Recurrent urinary tract infection ICD-10-CM: N39.0  ICD-9-CM: 599.0  9/27/2016 - Present        Nausea and vomiting ICD-10-CM: R11.2  ICD-9-CM: 787.01  4/10/2016 - Present Diverticulitis of intestine ICD-10-CM: K57.92  ICD-9-CM: 562.11  4/2/2016 - Present        Cystic disease of kidney ICD-10-CM: Q61.9  ICD-9-CM: 753.10  3/16/2015 - Present        Gastritis and duodenitis ICD-10-CM: K29.90  ICD-9-CM: 535.50  2/23/2015 - Present        Anemia ICD-10-CM: D64.9  ICD-9-CM: 285.9  11/10/2014 - Present        Disease due to gram-negative bacillus ICD-10-CM: A49.9  ICD-9-CM: 041.85  10/17/2014 - Present    Overview Signed 2/13/2017 11:58 AM by Chente LLANOS     Overview:   Acinetobacter baumannii Septicemia and UTI 10/14/2014 (cultures at [facility])             Fever ICD-10-CM: R50.9  ICD-9-CM: 780.60  10/14/2014 - Present        Drug-induced hyperglycemia ICD-10-CM: R73.9, T50.905A  ICD-9-CM: 790.29, E947.9  6/28/2014 - Present        Exacerbation of asthma ICD-10-CM: J45.901  ICD-9-CM: 493.92  6/25/2014 - Present        Systemic inflammatory response syndrome (SIRS) (HCC) ICD-10-CM: R65.10  ICD-9-CM: 995.90  6/23/2014 - Present        Kidney transplant rejection ICD-10-CM: T86.11  ICD-9-CM: 996.81  4/10/2014 - Present    Overview Signed 2/13/2017 11:58 AM by Ivelisse Yuen     Overview:   Collected: Rayne Mcduffie Dr: Stu Longoria MD    Case Number: [id number]  [address] [place] [address]    Case Number: [id number]    DIAGNOSIS:  ----------  ALLOGRAFT KIDNEY, NEEDLE BIOPSY (14 YEARS, 2 MONTHS):  - SUBOPTIMAL SPECIMEN: RENAL MEDULLA, INCLUDING DEEP MEDULLA WITH  FOCAL BENIGN UROTHELIAL EPITHELIUM. - MILD NEUTROPHILIC TUBULITIS WITH INTRALUMINAL NEUTROPHILS,  CORRELATE WITH URINE CULTURE TO RULE OUT BACTERIAL INFECTION. - MILD LYMPHOCYTIC TUBULITIS CONSISTENT WITH BORDERLINE CHANGE:  (\"SUSPICIOUS\" FOR ACUTE CELLULAR REJECTION) AS PER BANFF SCHEMA. - CONGESTED PERITUBULAR CAPILLARIES (NON-SPECIFIC), BUT RENAL  VEIN THROMBOSIS OR STENOSIS SHOULD BE EXCLUDED CLINICALLY.   - FOCAL SMALL INTERSTITIAL COLLECTION OF CAST MATERIAL  (NON-SPECIFIC), BUT  URINARY OBSTRUCTION SHOULD BE EXCLUDED CLINICALLY. - NO DEFINITIVE STAINING FOR POLYOMAVIRUSES (SEE DESCRIPTION). - FOCAL C4D REACTIVITY ALONG PERITUBULAR CAPILLARY WALLS WITH  HIGH NON-SPECIFIC BACKGROUND STAINING; SIGNIFICANCE UNCERTAIN AND  CORRELATION WITH FLOW PRA IS SUGGESTED TO EXCLUDE EARLY HUMORAL  REJECTION. - SMALL VESSEL SCLEROSIS, MILD TO MODERATE TO SEVERE.  - TUBULAR ATROPHY AND INTERSTITIAL FIBROSIS CANNOT BE ADEQUATELY  ASSESSED  IN THE ABSENCE OF RENAL CORTEX. Re-Bx - Collected: Yan Story Dr: Bernice Wagner MD    Case Number: [id number]  [address] [place] [address]    Case Number: [id number]    DIAGNOSIS:  ----------  ALLOGRAFT KIDNEY, NEEDLE BIOPSY (14 YEARS, 2 MONTHS):  - BORDERLINE CHANGE (\"SUSPICIOUS\" FOR ACUTE CELLULAR REJECTION)  TO FOCAL  MILD ACUTE CELLULAR REJECTION (BANFF TYPE 1A) (SEE COMMENT). - MILD NEUTROPHILIC INTERSTITIAL INFLAMMATION, CORRELATE WITH  URINE CULTURE  TO RULE OUT SUPERIMPOSED BACTERIAL INFECTION. - SMALL INTERSTITIAL COLLECTIONS OF PAS-POSITIVE CAST MATERIAL  AND CYSTICALLY DILATED PACKER'S SPACE WITH PAS-POSITIVE  PROTEINACEOUS FILTRATE (SEE  COMMENT). - ACUTE ISCHEMIC-TYPE TUBULAR INJURY IS NOTED.  - FOCAL C4D REACTIVITY ALONG PERITUBULAR CAPILLARY WALLS;  SIGNIFICANCE  UNCERTAIN AND CORRELATION WITH FLOW PRA IS SUGGESTED TO EXCLUDE  EARLY  HUMORAL REJECTION. - NEGATIVE IMMUNOSTAIN FOR POLYOMAVIRUSES (BK, HOMER). - CHRONIC CHANGES: GLOBAL SCLEROSIS IN APPROXIMATELY 14 OUT OF 60  (23%)  GLOMERULI, FOCAL SEGMENTAL GLOMERULOSCLEROSIS IN APPROXIMATELY  2 OUT OF  46 (4%) NON-OBSOLESCENT GLOMERULI, MODERATE TO SEVERE  ARTERIOSCLEROSIS,  MILD TO MODERATE TO SEVERE ARTERIOLAR HYALINE SCLEROSIS, AND  MODERATE  INTERSTITIAL FIBROSIS/TUBULAR ATROPHY (SEE COMMENT).              Acute renal failure [facility]) ICD-10-CM: N17.9  ICD-9-CM: 584.9  4/7/2014 - Present        Renal transplant disorder ICD-10-CM: T86.10  ICD-9-CM: 996.81  4/7/2014 - Present        Systemic infection ([facility] ICD-10-CM: A41.9  ICD-9-CM: 038.9  1/7/2014 - Present        Asthma ICD-10-CM: J45.909  ICD-9-CM: 493.90  8/21/2013 - Present        Diverticular disease of large intestine ICD-10-CM: K57.30  ICD-9-CM: 562.10  8/21/2013 - Present        Essential hypertension ICD-10-CM: I10  ICD-9-CM: 401.9  8/21/2013 - Present        Seasonal allergic rhinitis due to pollen ICD-10-CM: J30.1  ICD-9-CM: 477.0  8/21/2013 - Present        Hyperlipidemia ICD-10-CM: E78.5  ICD-9-CM: 272.4  8/21/2013 - Present        UTI (urinary tract infection) ICD-10-CM: N39.0  ICD-9-CM: 599.0  8/21/2013 - Present        Fecal incontinence ICD-10-CM: R15.9  ICD-9-CM: 787.60  4/17/2013 - Present        History of kidney transplant ICD-10-CM: Z94.0  ICD-9-CM: V42.0  4/30/2012 - Present        CMV (cytomegalovirus) antibody positive ICD-10-CM: R76.8  ICD-9-CM: 795.79  4/30/2012 - Present    Overview Signed 2/13/2017 11:58 AM by Uriah LLANOS     Overview:   Donor negative             Hematuria ICD-10-CM: R31.9  ICD-9-CM: 599.70  4/30/2012 - Present        Migraine without status migrainosus, not intractable ICD-10-CM: N96.550  ICD-9-CM: 346.90  2/24/2010 - Present        Renal cyst ICD-10-CM: N28.1  ICD-9-CM: 753.10  1/1/2002 - Present    Overview Signed 2/13/2017 12:01 PM by Jaqueline Mendes     kidney transplant              RESOLVED: Chronic obstructive pulmonary disease ([facility] ICD-10-CM: J44.9  ICD-9-CM: 151  8/21/2013 - 1/20/2022        RESOLVED: Gastroesophageal reflux disease ICD-10-CM: K21.9  ICD-9-CM: 530.81  8/21/2013 - 8/3/2021        RESOLVED: Adiposity ICD-10-CM: E66.9  ICD-9-CM: 278.00  2/1/2010 - 8/3/2021           Greater than 35 minutes were spent with the patient on counseling and coordination of care    Signed:   Sae Santoro MD  10/28/2022  2:56 PM

## 2022-10-28 NOTE — PROGRESS NOTES
Problem: Pain  Goal: *Control of Pain  10/28/2022 1247 by Yazan Beckwith  Outcome: Resolved/Met  10/28/2022 1113 by Yazan Beckwith  Outcome: Progressing Towards Goal  Goal: *PALLIATIVE CARE:  Alleviation of Pain  10/28/2022 1247 by Yazan Beckwith  Outcome: Resolved/Met  10/28/2022 1113 by Yazan Beckwith  Outcome: Progressing Towards Goal     Problem: Patient Education: Go to Patient Education Activity  Goal: Patient/Family Education  10/28/2022 1247 by Yazan Beckwith  Outcome: Resolved/Met  10/28/2022 1113 by Yazan Beckwith  Outcome: Progressing Towards Goal     Problem: Falls - Risk of  Goal: *Absence of Falls  Description: Document Julianne Posadas Fall Risk and appropriate interventions in the flowsheet. 10/28/2022 1247 by Yazan Beckwith  Outcome: Resolved/Met  10/28/2022 1113 by Yazan Beckwith  Outcome: Progressing Towards Goal  Note: Fall Risk Interventions:  Mobility Interventions: Strengthening exercises (ROM-active/passive), Utilize walker, cane, or other assistive device         Medication Interventions: Evaluate medications/consider consulting pharmacy, Patient to call before getting OOB, Teach patient to arise slowly    Elimination Interventions: Call light in reach, Toileting schedule/hourly rounds              Problem: Patient Education: Go to Patient Education Activity  Goal: Patient/Family Education  10/28/2022 1247 by Yazan Beckwith  Outcome: Resolved/Met  10/28/2022 1113 by Yazan Beckwith  Outcome: Progressing Towards Goal     Problem: Pressure Injury - Risk of  Goal: *Prevention of pressure injury  Description: Document Quinn Scale and appropriate interventions in the flowsheet.   10/28/2022 1247 by Yazan Beckwith  Outcome: Resolved/Met  10/28/2022 1113 by Yazan Beckwith  Outcome: Progressing Towards Goal  Note: Pressure Injury Interventions:  Sensory Interventions: Assess changes in LOC, Float heels, Keep linens dry and wrinkle-free, Minimize linen layers, Turn and reposition approx. every two hours (pillows and wedges if needed)    Moisture Interventions: Absorbent underpads, Minimize layers    Activity Interventions: Increase time out of bed, Pressure redistribution bed/mattress(bed type)    Mobility Interventions: Float heels, HOB 30 degrees or less, Turn and reposition approx. every two hours(pillow and wedges)    Nutrition Interventions: Document food/fluid/supplement intake, Offer support with meals,snacks and hydration    Friction and Shear Interventions: Minimize layers, Transferring/repositioning devices                Problem: Patient Education: Go to Patient Education Activity  Goal: Patient/Family Education  10/28/2022 1247 by Suzan Antunez  Outcome: Resolved/Met  10/28/2022 1113 by Suzan Antunez  Outcome: Progressing Towards Goal     Problem: Risk for Spread of Infection  Goal: Prevent transmission of infectious organism to others  Description: Prevent the transmission of infectious organisms to other patients, staff members, and visitors.   10/28/2022 1247 by Suzan Antunez  Outcome: Resolved/Met  10/28/2022 1113 by Suzan Antunez  Outcome: Progressing Towards Goal     Problem: Patient Education:  Go to Education Activity  Goal: Patient/Family Education  10/28/2022 1247 by Suzan Antunez  Outcome: Resolved/Met  10/28/2022 1113 by Suzan Antunez  Outcome: Progressing Towards Goal     Problem: Chronic Renal Failure  Goal: *Fluid and electrolytes stabilized  10/28/2022 1247 by Suzan Antunez  Outcome: Resolved/Met  10/28/2022 1113 by Suzan Antunez  Outcome: Progressing Towards Goal     Problem: Patient Education: Go to Patient Education Activity  Goal: Patient/Family Education  10/28/2022 1247 by Suzan Antunez  Outcome: Resolved/Met  10/28/2022 1113 by Suzan Antunez  Outcome: Progressing Towards Goal     Problem: Nutrition Deficit  Goal: *Optimize nutritional status  10/28/2022 1247 by Clay Chao  Outcome: Resolved/Met  10/28/2022 1113 by Clay Chao  Outcome: Progressing Towards Goal

## 2022-10-28 NOTE — PROGRESS NOTES
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 Plan: Quality of life issue. Scleral lenses are medically necessary. Make adjustments to scleral lenses. Problem: Pain  Goal: *Control of Pain  Outcome: Progressing Towards Goal  Goal: *PALLIATIVE CARE:  Alleviation of Pain  Outcome: Progressing Towards Goal     Problem: Patient Education: Go to Patient Education Activity  Goal: Patient/Family Education  Outcome: Progressing Towards Goal     Problem: Falls - Risk of  Goal: *Absence of Falls  Description: Document Nitin Blight Fall Risk and appropriate interventions in the flowsheet. Outcome: Progressing Towards Goal  Note: Fall Risk Interventions:  Mobility Interventions: Strengthening exercises (ROM-active/passive), Utilize walker, cane, or other assistive device         Medication Interventions: Evaluate medications/consider consulting pharmacy, Patient to call before getting OOB, Teach patient to arise slowly    Elimination Interventions: Call light in reach, Toileting schedule/hourly rounds              Problem: Patient Education: Go to Patient Education Activity  Goal: Patient/Family Education  Outcome: Progressing Towards Goal     Problem: Pressure Injury - Risk of  Goal: *Prevention of pressure injury  Description: Document Quinn Scale and appropriate interventions in the flowsheet. Outcome: Progressing Towards Goal  Note: Pressure Injury Interventions:  Sensory Interventions: Assess changes in LOC, Float heels, Keep linens dry and wrinkle-free, Minimize linen layers, Turn and reposition approx. every two hours (pillows and wedges if needed)    Moisture Interventions: Absorbent underpads, Minimize layers    Activity Interventions: Increase time out of bed, Pressure redistribution bed/mattress(bed type)    Mobility Interventions: Float heels, HOB 30 degrees or less, Turn and reposition approx.  every two hours(pillow and wedges)    Nutrition Interventions: Document food/fluid/supplement intake, Offer support with meals,snacks and hydration    Friction and Shear Interventions: Minimize layers, Transferring/repositioning devices                Problem: Patient Education: Go to Patient Education Activity  Goal: Patient/Family Education  Outcome: Progressing Towards Goal     Problem: Risk for Spread of Infection  Goal: Prevent transmission of infectious organism to others  Description: Prevent the transmission of infectious organisms to other patients, staff members, and visitors.   Outcome: Progressing Towards Goal     Problem: Patient Education:  Go to Education Activity  Goal: Patient/Family Education  Outcome: Progressing Towards Goal     Problem: Chronic Renal Failure  Goal: *Fluid and electrolytes stabilized  Outcome: Progressing Towards Goal     Problem: Patient Education: Go to Patient Education Activity  Goal: Patient/Family Education  Outcome: Progressing Towards Goal     Problem: Nutrition Deficit  Goal: *Optimize nutritional status  Outcome: Progressing Towards Goal

## 2022-10-28 NOTE — DIALYSIS
Hemodialysis / [phone]    Vitals Pre Post Assessment Pre Post   BP BP: 130/66 (10/28/22 1301) 135/54 LOC Alert oriented x4 Alert and oriented x4   HR Pulse (Heart Rate): 66 (10/28/22 1301) 77 Lungs Diminished on room air Diminished on room air   Resp Resp Rate: 20 (10/28/22 1301) 20 Cardiac B/p wnl B/p wnl   Temp Temp: 97.7 °F (36.5 °C) (10/28/22 1237) 98 Skin intact intact   Weight    Edema None none   Tele status No tele No tele Pain 0 0     Orders   Duration: Start: 5536 End: 1504 Total: 2.5hrs   Dialyzer: Dialyzer/Set Up Inspection: Revaclear (10/26/22 1550)   K Bath: Dialysate K (mEq/L): 2 (10/26/22 1610)   Ca Bath: Dialysate CA (mEq/L): 2.5 (10/26/22 1610)   Na: Dialysate NA (mEq/L): 138 (10/26/22 1610)   Bicarb: Dialysate HCO3 (mEq/L): 35 (10/26/22 1610)   Target Fluid Removal: Goal/Amount of Fluid to Remove (mL): 2500 mL (10/26/22 1550)     Access   Type & Location: Lt arm fistula   Comments:          Good thrill and bruit noted. No s/s of infection noted. Area cleaned and accessed with 15g needles x2. Good blood flow noted.                                 Labs   HBsAg (Antigen) / date:         10/2622 Neg                                      HBsAb (Antibody) / date: 10//26/22 Succ   Source: [facility]   Obtained/Reviewed  Critical Results Called HGB   Date Value Ref Range Status   10/27/2022 10.2 (L) 12.0 - 16.0 g/dL Final     Potassium   Date Value Ref Range Status   10/27/2022 4.3 3.5 - 5.5 mmol/L Final     Calcium   Date Value Ref Range Status   10/27/2022 7.7 (L) 8.5 - 10.1 mg/dL Final     BUN   Date Value Ref Range Status   10/27/2022 21 (H) 7 - 18 mg/dL Final     Creatinine   Date Value Ref Range Status   10/27/2022 5.71 (H) 0.60 - 1.30 mg/dL Final        Meds Given   Name Dose Route   None ordered                 Adequacy / Fluid    Total Liters Process: 61.2   Net Fluid Removed: 200 ml      Comments   Time Out Done:   (Time) 1220   Admitting Diagnosis: ESRD   Consent obtained/signed: Informed Consent Verified: Yes (10/26/22 1550)   Machine / RO # Machine Number: [id number] (10/26/22 1610)   Primary Nurse Rpt Pre: Quang Vizcaino RN   Primary Nurse Rpt Post: Quang Vizcaino RN   Pt Education: ESRD   Care Plan: ESRD   Pts outpatient clinic:      Tx Summary   Comments:       2316 Dialysis started  1415 Pt c/o cramping. Uf turned off and 100ml NS given. 1504 Dialysis stopped and blood rinsed back due to patient still cramping even though saline was given. Encouraged pt to complete treatment and I can give her another saline bolus. Pt refused and stated she wanted to stop treatment now. . Needles removed, pressure held till bleeding stopped (Venous site took 35 minutes to stop bleeding) and dressing applied to each site. Pt stable.  Pt had 30 minutes remaining in treatment

## 2022-10-28 NOTE — PROGRESS NOTES
0700- shift report received, care of the patient assumed    36- POC DISCUssed with patient and daughter. patient to have dialysis before discharge crystal with [facility] will be here to perform dialysis around 12, yo states she does not take medications prior to dialysis.  Given a chicken salad sandwich and a cup of coffee at the patient's request.    1244- taken to dialysis    1709- back from dialylsis, piv removed, education understood, wheeled downstairs and taken home by sister

## 2022-10-31 ENCOUNTER — PATIENT OUTREACH (OUTPATIENT)
Dept: CASE MANAGEMENT | Age: 67
End: 2022-10-31

## 2022-10-31 NOTE — PROGRESS NOTES
Care Transitions Initial Call    Call within 2 business days of discharge: Yes     Patient: Jenny Acuna Patient : 1955 MRN: [id number]    Last Discharge [address]       Date Complaint Diagnosis Description Type Department Provider    10/26/22 Diarrhea ESRD needing dialysis [facility]) ED to Hosp-Admission (Discharged) (ADMIT) [facility] Mesfin Hernandez MD; Angie Salmeron. .. Was this an external facility discharge? No Discharge Facility: n/a    CTN attempt to contact Pt. For follow up. Pt. Vm is not accepting messages at this time. CTN attempt to contact Pt. Mobile phone number, CTN reached Pt. Daughter on phone. Pt. Daughter did not provide her name to CTN. Pt. Daughter requesting for CTN to call back again later. No Patient  Personal/health/medical information given to Pt. Daughter who answered the phone.

## 2022-11-01 ENCOUNTER — TELEPHONE (OUTPATIENT)
Dept: FAMILY MEDICINE CLINIC | Age: 67
End: 2022-11-01

## 2022-11-01 ENCOUNTER — PATIENT OUTREACH (OUTPATIENT)
Dept: CASE MANAGEMENT | Age: 67
End: 2022-11-01

## 2022-11-01 NOTE — TELEPHONE ENCOUNTER
----- Message from Wilson Navarro sent at 11/1/2022 10:43 AM EDT -----  Subject: Message to Provider    QUESTIONS  Information for Provider? The patient stated that she had a healthcare   form filled out for her insurance but it her insurance misplaced it. The   patient would like another copy of the form printed out so she can resend   it to her insurance. ---------------------------------------------------------------------------  --------------  Sharmin COOPER  [phone]; OK to leave message on voicemail  ---------------------------------------------------------------------------  --------------  SCRIPT ANSWERS  Relationship to Patient?  Self

## 2022-11-01 NOTE — PROGRESS NOTES
Care Transitions Initial Call    Call within 2 business days of discharge: Yes     Patient: Sharon Trimble Patient : 1955 MRN: [id number]    Last Discharge [address]       Date Complaint Diagnosis Description Type Department Provider    10/26/22 Diarrhea ESRD needing dialysis [facility]) ED to Hosp-Admission (Discharged) (ADMIT) [facility] Dallas Murillo MD; Ever Valles. .. Was this an external facility discharge? No Discharge Facility: n/a    Care Transition Nurse (CTN) contacted the patient by telephone to perform post hospital discharge assessment. Verified name and  with patient as identifiers. Provided introduction to self, and explanation of the CTN role. CTN reached Pt. On phone. Pt. States that this is not a good time to talk and prefers for CTN to call back some other time. Pt. Kept the conversation short and concluded the call.

## 2022-11-03 ENCOUNTER — TELEPHONE (OUTPATIENT)
Dept: FAMILY MEDICINE CLINIC | Age: 67
End: 2022-11-03

## 2022-11-03 ENCOUNTER — OFFICE VISIT (OUTPATIENT)
Dept: FAMILY MEDICINE CLINIC | Age: 67
End: 2022-11-03
Payer: MEDICARE

## 2022-11-03 VITALS
WEIGHT: 144.1 LBS | HEART RATE: 68 BPM | HEIGHT: 61 IN | BODY MASS INDEX: 27.2 KG/M2 | SYSTOLIC BLOOD PRESSURE: 152 MMHG | DIASTOLIC BLOOD PRESSURE: 70 MMHG | OXYGEN SATURATION: 98 % | RESPIRATION RATE: 20 BRPM

## 2022-11-03 DIAGNOSIS — F51.01 PRIMARY INSOMNIA: ICD-10-CM

## 2022-11-03 DIAGNOSIS — I10 UNCONTROLLED HYPERTENSION: ICD-10-CM

## 2022-11-03 DIAGNOSIS — R19.7 DIARRHEA, UNSPECIFIED TYPE: ICD-10-CM

## 2022-11-03 DIAGNOSIS — Z78.0 POSTMENOPAUSAL STATE: ICD-10-CM

## 2022-11-03 DIAGNOSIS — J45.30 MILD PERSISTENT ASTHMA WITHOUT COMPLICATION: ICD-10-CM

## 2022-11-03 DIAGNOSIS — Z09 HOSPITAL DISCHARGE FOLLOW-UP: Primary | ICD-10-CM

## 2022-11-03 DIAGNOSIS — Z13.820 OSTEOPOROSIS SCREENING: ICD-10-CM

## 2022-11-03 PROBLEM — K52.9 ACUTE COLITIS: Status: RESOLVED | Noted: 2022-09-12 | Resolved: 2022-11-03

## 2022-11-03 PROBLEM — B37.81 THRUSH OF MOUTH AND ESOPHAGUS (HCC): Status: RESOLVED | Noted: 2021-03-02 | Resolved: 2022-11-03

## 2022-11-03 PROBLEM — Z76.89 ENCOUNTER FOR CHOLECYSTECTOMY: Status: RESOLVED | Noted: 2021-05-06 | Resolved: 2022-11-03

## 2022-11-03 PROBLEM — E87.5 ACUTE HYPERKALEMIA: Status: RESOLVED | Noted: 2021-06-12 | Resolved: 2022-11-03

## 2022-11-03 PROBLEM — M79.605 LEFT LEG PAIN: Status: RESOLVED | Noted: 2021-07-14 | Resolved: 2022-11-03

## 2022-11-03 PROBLEM — J45.31 MILD PERSISTENT ASTHMA WITH (ACUTE) EXACERBATION: Status: RESOLVED | Noted: 2021-12-24 | Resolved: 2022-11-03

## 2022-11-03 PROBLEM — R11.10 INTRACTABLE VOMITING: Status: RESOLVED | Noted: 2021-08-09 | Resolved: 2022-11-03

## 2022-11-03 PROBLEM — B37.0 THRUSH OF MOUTH AND ESOPHAGUS (HCC): Status: RESOLVED | Noted: 2021-03-02 | Resolved: 2022-11-03

## 2022-11-03 PROBLEM — K80.00 CALCULUS OF GALLBLADDER WITH ACUTE CHOLECYSTITIS WITHOUT OBSTRUCTION: Status: RESOLVED | Noted: 2020-10-09 | Resolved: 2022-11-03

## 2022-11-03 PROBLEM — K52.9 COLITIS: Status: RESOLVED | Noted: 2022-09-10 | Resolved: 2022-11-03

## 2022-11-03 PROBLEM — R11.2 INTRACTABLE NAUSEA AND VOMITING: Status: RESOLVED | Noted: 2022-09-10 | Resolved: 2022-11-03

## 2022-11-03 PROBLEM — K50.10 SEGMENTAL COLITIS ASSOCIATED WITH DIVERTICULOSIS (HCC): Status: RESOLVED | Noted: 2022-07-19 | Resolved: 2022-11-03

## 2022-11-03 PROBLEM — J01.01 ACUTE RECURRENT MAXILLARY SINUSITIS: Status: RESOLVED | Noted: 2020-08-06 | Resolved: 2022-11-03

## 2022-11-03 PROBLEM — R07.89 ATYPICAL CHEST PAIN: Status: RESOLVED | Noted: 2021-12-24 | Resolved: 2022-11-03

## 2022-11-03 PROBLEM — E87.70 FLUID OVERLOAD: Status: RESOLVED | Noted: 2022-02-02 | Resolved: 2022-11-03

## 2022-11-03 PROBLEM — R53.1 WEAKNESS: Status: RESOLVED | Noted: 2022-04-07 | Resolved: 2022-11-03

## 2022-11-03 PROBLEM — M54.50 ACUTE LEFT-SIDED LOW BACK PAIN WITHOUT SCIATICA: Status: RESOLVED | Noted: 2021-05-06 | Resolved: 2022-11-03

## 2022-11-03 PROBLEM — K57.92 DIVERTICULITIS: Status: RESOLVED | Noted: 2022-08-28 | Resolved: 2022-11-03

## 2022-11-03 PROBLEM — K57.30 SEGMENTAL COLITIS ASSOCIATED WITH DIVERTICULOSIS (HCC): Status: RESOLVED | Noted: 2022-07-19 | Resolved: 2022-11-03

## 2022-11-03 PROBLEM — R11.0 NAUSEA: Status: RESOLVED | Noted: 2022-04-07 | Resolved: 2022-11-03

## 2022-11-03 PROBLEM — K85.90 PANCREATITIS: Status: RESOLVED | Noted: 2022-08-28 | Resolved: 2022-11-03

## 2022-11-03 PROCEDURE — 99496 TRANSJ CARE MGMT HIGH F2F 7D: CPT | Performed by: STUDENT IN AN ORGANIZED HEALTH CARE EDUCATION/TRAINING PROGRAM

## 2022-11-03 RX ORDER — TRAZODONE HYDROCHLORIDE 50 MG/1
50 TABLET ORAL
Qty: 90 TABLET | Refills: 0 | Status: SHIPPED | OUTPATIENT
Start: 2022-11-03

## 2022-11-03 RX ORDER — FAMOTIDINE 20 MG/1
20 TABLET, FILM COATED ORAL DAILY
COMMUNITY
Start: 2022-10-13

## 2022-11-03 RX ORDER — PROCHLORPERAZINE MALEATE 10 MG
10 TABLET ORAL
COMMUNITY
Start: 2022-10-12 | End: 2022-11-03

## 2022-11-03 RX ORDER — ALBUTEROL SULFATE 90 UG/1
1 AEROSOL, METERED RESPIRATORY (INHALATION)
Qty: 18 G | Refills: 1 | Status: SHIPPED | OUTPATIENT
Start: 2022-11-03

## 2022-11-03 NOTE — PROGRESS NOTES
Ilene Yang presents today for a hospital follow up. Patient was in the hospital on 10/26/2022. Patient was seen for ESRD. Chief Complaint   Patient presents with    Hospital Follow Up       Is someone accompanying this pt? No    Is the patient using any DME equipment during OV? No    Depression Screening:  3 most recent PHQ Screens 11/3/2022   Little interest or pleasure in doing things Not at all   Feeling down, depressed, irritable, or hopeless Not at all   Total Score PHQ 2 0   Trouble falling or staying asleep, or sleeping too much -   Feeling tired or having little energy -   Poor appetite, weight loss, or overeating -   Feeling bad about yourself - or that you are a failure or have let yourself or your family down -   Trouble concentrating on things such as school, work, reading, or watching TV -   Moving or speaking so slowly that other people could have noticed; or the opposite being so fidgety that others notice -   Thoughts of being better off dead, or hurting yourself in some way -   PHQ 9 Score -       Learning Assessment:  Learning Assessment 8/17/2021   PRIMARY LEARNER Patient   HIGHEST LEVEL OF EDUCATION - PRIMARY LEARNER  SOME COLLEGE   BARRIERS PRIMARY LEARNER NONE   PRIMARY LANGUAGE ENGLISH   LEARNER PREFERENCE PRIMARY DEMONSTRATION   LEARNING SPECIAL TOPICS NO   ANSWERED BY SELF   RELATIONSHIP SELF       Abuse Screening:  Abuse Screening Questionnaire 9/22/2022   Do you ever feel afraid of your partner? N   Are you in a relationship with someone who physically or mentally threatens you? N   Is it safe for you to go home? Y       Fall Risk  Fall Risk Assessment, last 12 mths 9/22/2022   Able to walk? Yes   Fall in past 12 months? 0   Do you feel unsteady?  0   Are you worried about falling 0   Number of falls in past 12 months -   Fall with injury? -       ADL  ADL Assessment 9/22/2022   Feeding yourself No Help Needed   Getting from bed to chair No Help Needed   Getting dressed Help Needed Bathing or showering No Help Needed   Walk across the room (includes cane/walker) Help Needed   Using the telphone No Help Needed   Taking your medications No Help Needed   Preparing meals Help Needed   Managing money (expenses/bills) Help Needed   Moderately strenuous housework (laundry) Help Needed   Shopping for personal items (toiletries/medicines) Help Needed   Shopping for groceries Help Needed   Driving Help Needed   Climbing a flight of stairs Help Needed   Getting to places beyond walking distances Help Needed       Health Maintenance reviewed and discussed and ordered per Provider. Health Maintenance Due   Topic Date Due    Hepatitis C Screening  Never done    Shingrix Vaccine Age 49> (1 of 2) Never done    DTaP/Tdap/Td series (1 - Tdap) Never done    COVID-19 Vaccine (4 - Booster for Pfizer series) 05/18/2022    Bone Densitometry (Dexa) Screening  Never done   . Coordination of Care:  1. Have you been to the ER, urgent care clinic since your last visit? Hospitalized since your last visit? Yes, Jason    2. Have you seen or consulted any other health care providers outside of the [address] [place] [address] since your last visit? Include any pap smears or colon screening.  No

## 2022-11-03 NOTE — PROGRESS NOTES
Hospital Discharge Transition of Care Note    SUBJECTIVE    Patient presents for hospital follow up. Patient was admitted to [facility]  Date of admission was 10/26/22 to 10/28/22. Hospital discharge diagnoses: diarrhea. Hospital discharge summary was reviewed. Hospital coarse and discharge recommendations:  Patient was admitted for diarrhea that was persistent for 3 days. She reported abdominal discomfort and indigestion. She was given Compazine for nausea and Maalox for indigestion. CT abdomen pelvis without contrast was negative for any acute issues. Reports she falls asleep but cannot stay asleep. Has tried melotonin but states it did not work. Patient reports good sleep hygiene. We reviewed the recent hospitalization in detail. The patient reports doing much better. The patient denies any complaints at this time. The remaining 10 system review of systems was negative unless otherwise noted. The patients past medical history, allergies, medication list, social history, and family medical history were documented, updated, and reviewed in the chart. OBJECTIVE    Blood pressure (!) 152/70, pulse 68, resp. rate 20, height 5' 1\" (1.549 m), weight 144 lb 1.6 oz (65.4 kg), SpO2 98 %. General:  Alert, cooperative, well appearing, in no apparent distress. Head:  Head is symmetric, normocephalic without evidence of trauma or deformity. Eyes:  The conjunctiva are clear and noninjected. CV:  The heart sounds are regular in rate and rhythm. There is a normal S1 and S2. There or no murmurs, rubs, or gallops. Lungs: Inspiratory and expiratory efforts are full and unlabored. Lung sounds are clear and equal to auscultation throughout all lung fields without wheezing, rales, or rhonchi. Skin:  No rashes, no jaundice, cap refill <2 sec. 10/27/22 CT abd/pelv wo contrast  IMPRESSION     No acute intra-abdominal abnormality.      Right lower quadrant transplanted kidney with perinephric stranding, unchanged. No hydronephrosis. Diverticulosis. Cardiomegaly. ASSESSMENT / PLAN  1. Hospital discharge follow-up  Lab work, imaging, discharge notes reviewed. Medications and problem list reconciled. Patient states diarrhea has resolved. C. difficile was negative. 2. Primary insomnia  Patient endorses chronic insomnia. Has good sleep hygiene at home. Patient is on prednisone which could keep her awake at night however this is unavoidable to since she takes it for history of kidney transplant. Has tried melatonin without relief has tried Ambien without relief. We will try trazodone. - traZODone (DESYREL) 50 mg tablet; Take 1 Tablet by mouth nightly. Indications: insomnia associated with depression  Dispense: 90 Tablet; Refill: 0    3. Postmenopausal state, 4. Osteoporosis screening  - DEXA BONE DENSITY STUDY AXIAL; Future    5. Mild persistent asthma without complication  Patient with mild persistent asthma on Trelegy. We will also provide albuterol for rescue.  - albuterol (PROVENTIL HFA, VENTOLIN HFA, PROAIR HFA) 90 mcg/actuation inhaler; Take 1 Puff by inhalation every four (4) hours as needed for Wheezing. Indications: asthma attack  Dispense: 18 g; Refill: 1    6. Diarrhea, unspecified type  Patient initially admitted to the hospital for diarrhea illness. This is since resolved. Uncontrolled HTN  Patient also has uncontrolled high blood pressure here. Goal less than 140/90 per JNC 8 guidelines. We will have her follow-up in 1 month for blood pressure check as well as insomnia check. All medications were reconciled from hospital discharge.

## 2022-11-07 ENCOUNTER — PATIENT OUTREACH (OUTPATIENT)
Dept: CASE MANAGEMENT | Age: 67
End: 2022-11-07

## 2022-11-07 NOTE — PROGRESS NOTES
Care Transitions Follow Up Call    CTN Attempt to contact patient via telephone on 11/7/22 for follow up. Unable to reach. Unable to leave a vm. Recording \" Voicemailbox is unavailable.  \"    [facility] follow up appointment(s):   Future Appointments   Date Time Provider Elena Coello   11/9/2022 10:30 AM [facility]   12/5/2022  4:15 PM Caroline Jha MD [facility] BS AMB

## 2022-11-09 ENCOUNTER — HOSPITAL ENCOUNTER (OUTPATIENT)
Dept: MAMMOGRAPHY | Age: 67
Discharge: HOME | End: 2022-11-09
Attending: STUDENT IN AN ORGANIZED HEALTH CARE EDUCATION/TRAINING PROGRAM
Payer: MEDICARE

## 2022-11-09 DIAGNOSIS — Z13.820 OSTEOPOROSIS SCREENING: ICD-10-CM

## 2022-11-09 DIAGNOSIS — Z78.0 POSTMENOPAUSAL STATE: ICD-10-CM

## 2022-11-09 PROCEDURE — 77080 DXA BONE DENSITY AXIAL: CPT

## 2022-11-11 DIAGNOSIS — M81.0 OSTEOPOROSIS WITHOUT CURRENT PATHOLOGICAL FRACTURE, UNSPECIFIED OSTEOPOROSIS TYPE: Primary | ICD-10-CM

## 2022-11-14 ENCOUNTER — PATIENT OUTREACH (OUTPATIENT)
Dept: CASE MANAGEMENT | Age: 67
End: 2022-11-14

## 2022-11-14 DIAGNOSIS — I65.02 STENOSIS OF LEFT VERTEBRAL ARTERY: ICD-10-CM

## 2022-11-14 RX ORDER — MECLIZINE HYDROCHLORIDE 25 MG/1
TABLET ORAL
Qty: 90 TABLET | Refills: 0 | Status: SHIPPED | OUTPATIENT
Start: 2022-11-14

## 2022-11-14 NOTE — PROGRESS NOTES
Care Transitions Follow Up Call    Challenges to be reviewed by the provider   Additional needs identified to be addressed with provider: no  none           Method of communication with provider : none      CTN attempt to contact Pt. For follow up. Pt. Vm is not accepting messages at this time. CTN attempt to contact Pt. Mobile phone number, CTN reached Pt. Daughter on phone. Pt. Reports that Pt. Is currently not at home. Pt. Is doing good as per Pt. Pt. Daughter  kept the conversation. No questions, concerns and/or needs verbalized by Pt. daughter At this time. No Patient  Personal/health/medical information given to Pt. Daughter who answered the phone at this time.        [facility] follow up appointment(s):   Future Appointments   Date Time Provider Elena Coello   12/5/2022  4:15 PM Yani Murphy MD [facility]

## 2022-11-15 ENCOUNTER — PATIENT OUTREACH (OUTPATIENT)
Dept: CASE MANAGEMENT | Age: 67
End: 2022-11-15

## 2022-11-15 NOTE — PROGRESS NOTES
Care Transitions Follow Up Call    Challenges to be reviewed by the provider   Additional needs identified to be addressed with provider: no  none           Method of communication with provider : none    Care Transition Nurse (CTN) contacted the patient by telephone to follow up after admission on 10/26/22. Verified name and  with patient as identifiers. Patient reports that  she is doing well. No new or worsening of symptoms reported by Pt. At this time . Pt. Reports that symptoms have resolved. No questions, concerns and/or needs at this time as per Pt.      [facility] follow up appointment(s):   Future Appointments   Date Time Provider Elena Coello   2022  4:15 PM Carmen Churchill MD [facility] BS AMB

## 2022-11-17 ENCOUNTER — PATIENT MESSAGE (OUTPATIENT)
Dept: FAMILY MEDICINE CLINIC | Age: 67
End: 2022-11-17

## 2022-11-19 RX ORDER — FLUTICASONE FUROATE, UMECLIDINIUM BROMIDE AND VILANTEROL TRIFENATATE 100; 62.5; 25 UG/1; UG/1; UG/1
POWDER RESPIRATORY (INHALATION)
Qty: 60 BLISTER | Refills: 1 | Status: SHIPPED | OUTPATIENT
Start: 2022-11-19

## 2022-11-21 ENCOUNTER — APPOINTMENT (OUTPATIENT)
Dept: GENERAL RADIOLOGY | Age: 67
DRG: 640 | End: 2022-11-21
Attending: EMERGENCY MEDICINE
Payer: MEDICARE

## 2022-11-21 ENCOUNTER — HOSPITAL ENCOUNTER (INPATIENT)
Age: 67
LOS: 2 days | Discharge: HOME | DRG: 640 | End: 2022-11-23
Attending: EMERGENCY MEDICINE | Admitting: INTERNAL MEDICINE
Payer: MEDICARE

## 2022-11-21 DIAGNOSIS — E87.70 HYPERVOLEMIA, UNSPECIFIED HYPERVOLEMIA TYPE: Primary | ICD-10-CM

## 2022-11-21 DIAGNOSIS — R77.8 ELEVATED TROPONIN: ICD-10-CM

## 2022-11-21 DIAGNOSIS — N18.6 ESRD NEEDING DIALYSIS (HCC): ICD-10-CM

## 2022-11-21 DIAGNOSIS — E87.5 ACUTE HYPERKALEMIA: ICD-10-CM

## 2022-11-21 DIAGNOSIS — Z99.2 ESRD NEEDING DIALYSIS (HCC): ICD-10-CM

## 2022-11-21 PROBLEM — R79.89 ELEVATED TROPONIN: Status: ACTIVE | Noted: 2022-11-21

## 2022-11-21 LAB
ALBUMIN SERPL-MCNC: 3.4 G/DL (ref 3.4–5)
ALBUMIN/GLOB SERPL: 0.9 {RATIO} (ref 0.8–1.7)
ALP SERPL-CCNC: 148 U/L (ref 45–117)
ALT SERPL-CCNC: 21 U/L (ref 13–56)
ANION GAP SERPL CALC-SCNC: 9 MMOL/L (ref 3–18)
APPEARANCE UR: CLEAR
AST SERPL W P-5'-P-CCNC: 17 U/L (ref 10–38)
ATRIAL RATE: 84 BPM
BACTERIA URNS QL MICRO: ABNORMAL /HPF
BASOPHILS # BLD: 0.1 K/UL (ref 0–0.1)
BASOPHILS NFR BLD: 1 % (ref 0–2)
BILIRUB SERPL-MCNC: 0.8 MG/DL (ref 0.2–1)
BILIRUB UR QL: NEGATIVE
BUN SERPL-MCNC: 68 MG/DL (ref 7–18)
BUN/CREAT SERPL: 7 (ref 12–20)
CA-I BLD-MCNC: 7.8 MG/DL (ref 8.5–10.1)
CALCULATED P AXIS, ECG09: 70 DEGREES
CALCULATED R AXIS, ECG10: 74 DEGREES
CALCULATED T AXIS, ECG11: 44 DEGREES
CHLORIDE SERPL-SCNC: 106 MMOL/L (ref 100–111)
CO2 SERPL-SCNC: 21 MMOL/L (ref 21–32)
COLOR UR: YELLOW
CREAT SERPL-MCNC: 9.76 MG/DL (ref 0.6–1.3)
DIAGNOSIS, 93000: NORMAL
DIFFERENTIAL METHOD BLD: ABNORMAL
EOSINOPHIL # BLD: 0.1 K/UL (ref 0–0.4)
EOSINOPHIL NFR BLD: 1 % (ref 0–5)
EPITH CASTS URNS QL MICRO: ABNORMAL /LPF (ref 0–20)
ERYTHROCYTE [DISTWIDTH] IN BLOOD BY AUTOMATED COUNT: 15.8 % (ref 11.6–14.5)
GLOBULIN SER CALC-MCNC: 3.9 G/DL (ref 2–4)
GLUCOSE BLD STRIP.AUTO-MCNC: 161 MG/DL (ref 70–110)
GLUCOSE BLD STRIP.AUTO-MCNC: 40 MG/DL (ref 70–110)
GLUCOSE BLD STRIP.AUTO-MCNC: 77 MG/DL (ref 70–110)
GLUCOSE SERPL-MCNC: 73 MG/DL (ref 74–99)
GLUCOSE UR STRIP.AUTO-MCNC: NEGATIVE MG/DL
HCT VFR BLD AUTO: 35.5 % (ref 35–45)
HGB BLD-MCNC: 11 G/DL (ref 12–16)
HGB UR QL STRIP: ABNORMAL
IMM GRANULOCYTES # BLD AUTO: 0 K/UL (ref 0–0.04)
IMM GRANULOCYTES NFR BLD AUTO: 0 % (ref 0–0.5)
KETONES UR QL STRIP.AUTO: NEGATIVE MG/DL
LEUKOCYTE ESTERASE UR QL STRIP.AUTO: NEGATIVE
LIPASE SERPL-CCNC: 221 U/L (ref 73–393)
LYMPHOCYTES # BLD: 1.4 K/UL (ref 0.9–3.6)
LYMPHOCYTES NFR BLD: 20 % (ref 21–52)
MAGNESIUM SERPL-MCNC: 2.1 MG/DL (ref 1.6–2.6)
MCH RBC QN AUTO: 32.2 PG (ref 24–34)
MCHC RBC AUTO-ENTMCNC: 31 G/DL (ref 31–37)
MCV RBC AUTO: 103.8 FL (ref 78–100)
MONOCYTES # BLD: 0.8 K/UL (ref 0.05–1.2)
MONOCYTES NFR BLD: 11 % (ref 3–10)
NEUTS SEG # BLD: 4.7 K/UL (ref 1.8–8)
NEUTS SEG NFR BLD: 67 % (ref 40–73)
NITRITE UR QL STRIP.AUTO: NEGATIVE
NRBC # BLD: 0 K/UL (ref 0–0.01)
NRBC BLD-RTO: 0 PER 100 WBC
P-R INTERVAL, ECG05: 193 MS
PERFORMED BY, TECHID: ABNORMAL
PERFORMED BY, TECHID: ABNORMAL
PERFORMED BY, TECHID: NORMAL
PH UR STRIP: 8 [PH] (ref 5–8)
PLATELET # BLD AUTO: 202 K/UL (ref 135–420)
PMV BLD AUTO: 10.3 FL (ref 9.2–11.8)
POTASSIUM SERPL-SCNC: 6 MMOL/L (ref 3.5–5.5)
PROT SERPL-MCNC: 7.3 G/DL (ref 6.4–8.2)
PROT UR STRIP-MCNC: 300 MG/DL
Q-T INTERVAL, ECG07: 418 MS
QRS DURATION, ECG06: 93 MS
QTC CALCULATION (BEZET), ECG08: 497 MS
RBC # BLD AUTO: 3.42 M/UL (ref 4.2–5.3)
RBC #/AREA URNS HPF: ABNORMAL /HPF (ref 0–2)
SODIUM SERPL-SCNC: 136 MMOL/L (ref 136–145)
SP GR UR REFRACTOMETRY: 1.01 (ref 1–1.03)
TROPONIN-HIGH SENSITIVITY: 486 NG/L (ref 0–54)
TROPONIN-HIGH SENSITIVITY: 511 NG/L (ref 0–54)
UROBILINOGEN UR QL STRIP.AUTO: 0.2 EU/DL (ref 0.2–1)
VENTRICULAR RATE, ECG03: 85 BPM
WBC # BLD AUTO: 7 K/UL (ref 4.6–13.2)
WBC URNS QL MICRO: ABNORMAL /HPF (ref 0–4)

## 2022-11-21 PROCEDURE — 85025 COMPLETE CBC W/AUTO DIFF WBC: CPT

## 2022-11-21 PROCEDURE — 74011000258 HC RX REV CODE- 258: Performed by: EMERGENCY MEDICINE

## 2022-11-21 PROCEDURE — 65270000029 HC RM PRIVATE

## 2022-11-21 PROCEDURE — 82962 GLUCOSE BLOOD TEST: CPT

## 2022-11-21 PROCEDURE — 84484 ASSAY OF TROPONIN QUANT: CPT

## 2022-11-21 PROCEDURE — 74011636637 HC RX REV CODE- 636/637: Performed by: EMERGENCY MEDICINE

## 2022-11-21 PROCEDURE — 96361 HYDRATE IV INFUSION ADD-ON: CPT

## 2022-11-21 PROCEDURE — 81001 URINALYSIS AUTO W/SCOPE: CPT

## 2022-11-21 PROCEDURE — 99285 EMERGENCY DEPT VISIT HI MDM: CPT

## 2022-11-21 PROCEDURE — 74011000250 HC RX REV CODE- 250: Performed by: EMERGENCY MEDICINE

## 2022-11-21 PROCEDURE — 83690 ASSAY OF LIPASE: CPT

## 2022-11-21 PROCEDURE — 36415 COLL VENOUS BLD VENIPUNCTURE: CPT

## 2022-11-21 PROCEDURE — 80053 COMPREHEN METABOLIC PANEL: CPT

## 2022-11-21 PROCEDURE — 71045 X-RAY EXAM CHEST 1 VIEW: CPT

## 2022-11-21 PROCEDURE — 93005 ELECTROCARDIOGRAM TRACING: CPT

## 2022-11-21 PROCEDURE — 74011250637 HC RX REV CODE- 250/637: Performed by: INTERNAL MEDICINE

## 2022-11-21 PROCEDURE — 74011000250 HC RX REV CODE- 250: Performed by: INTERNAL MEDICINE

## 2022-11-21 PROCEDURE — 83735 ASSAY OF MAGNESIUM: CPT

## 2022-11-21 PROCEDURE — 96375 TX/PRO/DX INJ NEW DRUG ADDON: CPT

## 2022-11-21 PROCEDURE — 96374 THER/PROPH/DIAG INJ IV PUSH: CPT

## 2022-11-21 PROCEDURE — 74011250636 HC RX REV CODE- 250/636: Performed by: EMERGENCY MEDICINE

## 2022-11-21 RX ORDER — CINACALCET 30 MG/1
30 TABLET, FILM COATED ORAL DAILY
Status: DISCONTINUED | OUTPATIENT
Start: 2022-11-22 | End: 2022-11-23 | Stop reason: HOSPADM

## 2022-11-21 RX ORDER — HYDRALAZINE HYDROCHLORIDE 20 MG/ML
20 INJECTION INTRAMUSCULAR; INTRAVENOUS ONCE
Status: DISCONTINUED | OUTPATIENT
Start: 2022-11-21 | End: 2022-11-21

## 2022-11-21 RX ORDER — DEXTROSE 50 % IN WATER (D50W) INTRAVENOUS SYRINGE
50
Status: DISCONTINUED | OUTPATIENT
Start: 2022-11-21 | End: 2022-11-21

## 2022-11-21 RX ORDER — AMLODIPINE BESYLATE 5 MG/1
10 TABLET ORAL DAILY
Status: DISCONTINUED | OUTPATIENT
Start: 2022-11-22 | End: 2022-11-23 | Stop reason: HOSPADM

## 2022-11-21 RX ORDER — POLYETHYLENE GLYCOL 3350 17 G/17G
17 POWDER, FOR SOLUTION ORAL DAILY PRN
Status: DISCONTINUED | OUTPATIENT
Start: 2022-11-21 | End: 2022-11-23 | Stop reason: HOSPADM

## 2022-11-21 RX ORDER — HYDRALAZINE HYDROCHLORIDE 20 MG/ML
20 INJECTION INTRAMUSCULAR; INTRAVENOUS
Status: DISCONTINUED | OUTPATIENT
Start: 2022-11-21 | End: 2022-11-23 | Stop reason: HOSPADM

## 2022-11-21 RX ORDER — LEVOTHYROXINE SODIUM 75 UG/1
75 TABLET ORAL
Status: DISCONTINUED | OUTPATIENT
Start: 2022-11-22 | End: 2022-11-22

## 2022-11-21 RX ORDER — ACETAMINOPHEN 325 MG/1
650 TABLET ORAL
Status: DISCONTINUED | OUTPATIENT
Start: 2022-11-21 | End: 2022-11-23 | Stop reason: HOSPADM

## 2022-11-21 RX ORDER — DEXTROSE MONOHYDRATE 100 MG/ML
250 INJECTION, SOLUTION INTRAVENOUS ONCE
Status: COMPLETED | OUTPATIENT
Start: 2022-11-21 | End: 2022-11-21

## 2022-11-21 RX ORDER — ACETAMINOPHEN 650 MG/1
650 SUPPOSITORY RECTAL
Status: DISCONTINUED | OUTPATIENT
Start: 2022-11-21 | End: 2022-11-23 | Stop reason: HOSPADM

## 2022-11-21 RX ORDER — IPRATROPIUM BROMIDE AND ALBUTEROL SULFATE 2.5; .5 MG/3ML; MG/3ML
3 SOLUTION RESPIRATORY (INHALATION)
Status: DISCONTINUED | OUTPATIENT
Start: 2022-11-21 | End: 2022-11-23 | Stop reason: HOSPADM

## 2022-11-21 RX ORDER — MONTELUKAST SODIUM 10 MG/1
10 TABLET ORAL DAILY
Status: DISCONTINUED | OUTPATIENT
Start: 2022-11-22 | End: 2022-11-23 | Stop reason: HOSPADM

## 2022-11-21 RX ORDER — ONDANSETRON 2 MG/ML
4 INJECTION INTRAMUSCULAR; INTRAVENOUS
Status: DISCONTINUED | OUTPATIENT
Start: 2022-11-21 | End: 2022-11-23 | Stop reason: HOSPADM

## 2022-11-21 RX ORDER — ATORVASTATIN CALCIUM 10 MG/1
10 TABLET, FILM COATED ORAL DAILY
Status: DISCONTINUED | OUTPATIENT
Start: 2022-11-22 | End: 2022-11-23 | Stop reason: HOSPADM

## 2022-11-21 RX ORDER — ONDANSETRON 4 MG/1
4 TABLET, ORALLY DISINTEGRATING ORAL
Status: DISCONTINUED | OUTPATIENT
Start: 2022-11-21 | End: 2022-11-23 | Stop reason: HOSPADM

## 2022-11-21 RX ORDER — PREDNISONE 5 MG/1
5 TABLET ORAL DAILY
Status: DISCONTINUED | OUTPATIENT
Start: 2022-11-22 | End: 2022-11-23 | Stop reason: HOSPADM

## 2022-11-21 RX ORDER — SODIUM BICARBONATE 1 MEQ/ML
50 SYRINGE (ML) INTRAVENOUS
Status: COMPLETED | OUTPATIENT
Start: 2022-11-21 | End: 2022-11-21

## 2022-11-21 RX ORDER — SEVELAMER CARBONATE 800 MG/1
800 TABLET, FILM COATED ORAL 3 TIMES DAILY
Status: DISCONTINUED | OUTPATIENT
Start: 2022-11-22 | End: 2022-11-23 | Stop reason: HOSPADM

## 2022-11-21 RX ORDER — VALGANCICLOVIR 450 MG/1
900 TABLET, FILM COATED ORAL DAILY
Status: DISCONTINUED | OUTPATIENT
Start: 2022-11-22 | End: 2022-11-23 | Stop reason: HOSPADM

## 2022-11-21 RX ORDER — ONDANSETRON 2 MG/ML
4 INJECTION INTRAMUSCULAR; INTRAVENOUS ONCE
Status: COMPLETED | OUTPATIENT
Start: 2022-11-21 | End: 2022-11-21

## 2022-11-21 RX ORDER — SODIUM CHLORIDE 0.9 % (FLUSH) 0.9 %
5-40 SYRINGE (ML) INJECTION EVERY 8 HOURS
Status: DISCONTINUED | OUTPATIENT
Start: 2022-11-22 | End: 2022-11-23 | Stop reason: HOSPADM

## 2022-11-21 RX ORDER — LOSARTAN POTASSIUM 25 MG/1
50 TABLET ORAL DAILY
Status: DISCONTINUED | OUTPATIENT
Start: 2022-11-22 | End: 2022-11-23 | Stop reason: HOSPADM

## 2022-11-21 RX ORDER — CARVEDILOL 12.5 MG/1
25 TABLET ORAL 2 TIMES DAILY WITH MEALS
Status: DISCONTINUED | OUTPATIENT
Start: 2022-11-22 | End: 2022-11-23 | Stop reason: HOSPADM

## 2022-11-21 RX ORDER — FAMOTIDINE 20 MG/1
20 TABLET, FILM COATED ORAL DAILY
Status: DISCONTINUED | OUTPATIENT
Start: 2022-11-22 | End: 2022-11-23 | Stop reason: HOSPADM

## 2022-11-21 RX ORDER — AMIODARONE HYDROCHLORIDE 200 MG/1
200 TABLET ORAL DAILY
Status: DISCONTINUED | OUTPATIENT
Start: 2022-11-22 | End: 2022-11-23 | Stop reason: HOSPADM

## 2022-11-21 RX ORDER — SODIUM CHLORIDE 0.9 % (FLUSH) 0.9 %
5-40 SYRINGE (ML) INJECTION AS NEEDED
Status: DISCONTINUED | OUTPATIENT
Start: 2022-11-21 | End: 2022-11-23 | Stop reason: HOSPADM

## 2022-11-21 RX ORDER — SODIUM POLYSTYRENE SULFONATE 15 G/60ML
15 SUSPENSION ORAL; RECTAL
Status: COMPLETED | OUTPATIENT
Start: 2022-11-21 | End: 2022-11-21

## 2022-11-21 RX ADMIN — CALCIUM GLUCONATE 2 G: 98 INJECTION, SOLUTION INTRAVENOUS at 19:59

## 2022-11-21 RX ADMIN — SODIUM CHLORIDE 500 ML: 9 INJECTION, SOLUTION INTRAVENOUS at 16:18

## 2022-11-21 RX ADMIN — INSULIN HUMAN 10 UNITS: 100 INJECTION, SOLUTION PARENTERAL at 18:41

## 2022-11-21 RX ADMIN — DEXTROSE MONOHYDRATE 250 ML: 10 INJECTION, SOLUTION INTRAVENOUS at 21:54

## 2022-11-21 RX ADMIN — SODIUM CHLORIDE, PRESERVATIVE FREE 10 ML: 5 INJECTION INTRAVENOUS at 23:02

## 2022-11-21 RX ADMIN — ONDANSETRON 4 MG: 2 INJECTION INTRAMUSCULAR; INTRAVENOUS at 16:18

## 2022-11-21 RX ADMIN — SODIUM POLYSTYRENE SULFONATE 15 G: 15 SUSPENSION ORAL; RECTAL at 22:53

## 2022-11-21 RX ADMIN — SODIUM BICARBONATE 50 MEQ: 84 INJECTION, SOLUTION INTRAVENOUS at 18:40

## 2022-11-21 RX ADMIN — DEXTROSE MONOHYDRATE 250 ML: 100 INJECTION, SOLUTION INTRAVENOUS at 18:41

## 2022-11-21 NOTE — ED PROVIDER NOTES
EMERGENCY DEPARTMENT HISTORY AND PHYSICAL EXAM      Date: 11/21/2022  Patient Name: Hui Willis    History of Presenting Illness     Chief Complaint   Patient presents with    Fatigue       History Provided By: Patient    HPI: Hui Willis, 74 y.o. female  presents to the ED with cc of weakness and fatigue. Patient states she began feeling weak and fatigued yesterday. She does have a history of renal disease and is on dialysis Monday and Fridays. She was dialyzed on Friday. Today she states she was unable to go to dialysis because she was too weak to drive herself. She has been having headaches. She complains of nausea and chest pain. No diarrhea. She states urine is strong and burns with urination. She is lost her appetite and has not had anything to eat or drink since about yesterday.     Past History     Past Medical History:  Past Medical History:   Diagnosis Date    JEFF (acute kidney injury) ([facility] [address] 05/09/2019    Anemia NEC     Anxiety     Arrhythmia     Arthritis     Asthma     Asthma     Burning with urination     frequent uti    Calculus of gallbladder with acute cholecystitis without obstruction 10/09/2020    Cholecystitis 01/12/2019    Chronic kidney disease     dialysis    CMV (cytomegalovirus) antibody positive     Colitis 09/10/2022    COPD (chronic obstructive pulmonary disease) ([facility]     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    Dialysis patient [facility])     M/W/F    Diverticular disease of colon 08/21/2013    Dyspepsia and other specified disorders of function of stomach     stomach ulcer    Elevated troponin 10/21/2019    Encounter for cholecystectomy 05/06/2021 7/2020    Essential hypertension     GERD (gastroesophageal reflux disease)     History of chemotherapy     History of renal transplant 08/21/2013    (LD 2/12/2002)    HLD (hyperlipidemia)     Hypercholesteremia     Hypertension     Hypothyroidism 03/23/2022    Kidney transplant rejection     Menopause     Migraine     Obesity Osteoporosis     Pancreatitis 08/28/2022    Pyelonephritis 07/13/2020    Recurrent urinary tract infection 09/27/2016    Renal cyst 2002    kidney transplant     Spontaneous bacterial peritonitis ([facility] [address] 01/16/2019    Ulcer        Past Surgical History:  Past Surgical History:   Procedure Laterality Date    COLONOSCOPY      Dr Paty Miranda ago    ENDOSCOPY VISIT-OUTPATIENT      Dr Adalberto Mccain  5 yrs ago    ENDOSCOPY, COLON, DIAGNOSTIC      with Dr. Armando Topete CHOLECYSTECTOMY  02/2021    HX COLONOSCOPY  05/13/2022    HX GI      ulcer    HX HEENT      sinus surg    HX OTHER SURGICAL  02/21/2022    SIGMOIDOSCOPY, FLEXIBLE;  Surgeon: Shaheen Rubio MD    HX RENAL TRANSPLANT      HX TRANSPLANT      kidney transplant 2002    VASCULAR SURGERY PROCEDURE UNLIST      shunt in prep for dialysis       Medications:  No current facility-administered medications on file prior to encounter. Current Outpatient Medications on File Prior to Encounter   Medication Sig Dispense Refill    Trelegy Ellipta 100-62.5-25 mcg inhaler INHALE 1 PUFF EVERY DAY. PLEASE CALL AND SCHEDULE AN APPOINTMENT WITH NEW PCP FOR FUTURE REFILLS 60 Blister 1    meclizine (ANTIVERT) 25 mg tablet TAKE 1 TABLET THREE TIMES DAILY AS NEEDED FOR DIZZINESS 90 Tablet 0    famotidine (PEPCID) 20 mg tablet Take 20 mg by mouth daily. traZODone (DESYREL) 50 mg tablet Take 1 Tablet by mouth nightly. Indications: insomnia associated with depression 90 Tablet 0    albuterol (PROVENTIL HFA, VENTOLIN HFA, PROAIR HFA) 90 mcg/actuation inhaler Take 1 Puff by inhalation every four (4) hours as needed for Wheezing. Indications: asthma attack 18 g 1    fluticasone propionate (FLONASE) 50 mcg/actuation nasal spray USE 1 SPRAY IN EACH NOSTRIL EVERY MORNING 16 g 2    EPINEPHrine (EPIPEN) 0.3 mg/0.3 mL injection INJECT 0.3 ML INTRAMUSCULARLY ONCE AS NEEDED FOR ALLERGIC RESPONSE 1 Each 1    Dexilant 60 mg CpDB capsule (delayed release) TAKE 1 CAPSULE BY MOUTH IN THE MORNING.  [address] Capsule 0    diclofenac (VOLTAREN) 1 % gel Apply  to affected area four (4) times daily. 200 g 1    ondansetron (ZOFRAN ODT) 4 mg disintegrating tablet Take 2 Tablets by mouth every eight (8) hours as needed for Nausea or Nausea or Vomiting. 15 Tablet 0    polyethylene glycol (Miralax) 17 gram packet Take 1 Packet by mouth two (2) times a day. 60 Each 2    doxercalciferoL (HECTOROL) 4 mcg/2 mL injection 6 mcg by IntraVENous route. epoetin jalen (EPOGEN;PROCRIT) 10,000 unit/mL injection 10,000 Units by SubCUTAneous route. cinacalcet (SENSIPAR) 30 mg tablet Take 30 mg by mouth daily. losartan (COZAAR) 50 mg tablet Take 1 Tablet by mouth daily. 90 Tablet 1    amiodarone (CORDARONE) 200 mg tablet TAKE 1 TABLET BY MOUTH EVERY DAY 90 Tablet 1    amLODIPine (NORVASC) 10 mg tablet Take 1 tablet by mouth once daily 90 Tablet 1    carvediloL (COREG) 25 mg tablet Take 1 Tablet by mouth two (2) times daily (with meals). 180 Tablet 1    levothyroxine (SYNTHROID) 75 mcg tablet Take 1 Tablet by mouth Daily (before breakfast). 90 Tablet 1    simvastatin (ZOCOR) 20 mg tablet TAKE 1 TABLET BY MOUTH DAILY AS DIRECTED. 90 Tablet 1    valGANciclovir (VALCYTE) 450 mg tablet Take 2 Tablets by mouth in the morning. 180 Tablet 1    dicyclomine (BENTYL) 10 mg capsule Take 1 Capsule by mouth in the morning. 90 Capsule 1    montelukast (SINGULAIR) 10 mg tablet Take 1 Tablet by mouth in the morning. 90 Tablet 1    hyoscyamine SL (LEVSIN/SL) 0.125 mg SL tablet 1 Tablet by SubLINGual route every four (4) hours as needed (irritable bowel). 30 Tablet 2    sucralfate (CARAFATE) 1 gram tablet TAKE 1 TABLET BY MOUTH FOUR TIMES DAILY 360 Tablet 1    apixaban (ELIQUIS) 2.5 mg tablet Take 1 Tablet by mouth two (2) times a day. 180 Tablet 1    predniSONE (DELTASONE) 5 mg tablet Take 1 Tablet by mouth daily.       albuterol (PROVENTIL VENTOLIN) 2.5 mg /3 mL (0.083 %) nebu USE 1 VIAL VIA NEBULIZER THREE TIMES DAILY 90 mL 3    calcitRIOL (ROCALTROL) 0.5 mcg capsule Take 0.5 mcg by mouth See Admin Instructions. Take on non dialysis days (Tues, Wed, Thur, Sat, Sun)  Dialysis is on Monday and Friday      RenaPlex-D 800 mcg-12.5 mg -2,000 unit tab Take 1 Tablet by mouth daily. sevelamer carbonate (RENVELA) 800 mg tab tab Take 800 mg by mouth three (3) times daily. aluminum & magnesium hydroxide-simethicone (Maalox Maximum Strength) 400-400-40 mg/5 mL suspension Take 10 mL by mouth every six (6) hours as needed for Indigestion. 250 mL 0    ESTRACE 0.01 % (0.1 mg/gram) vaginal cream INSERT 2 GRAMS INTO VAGINA EVERY MONDAY AND THURSDAY 42.5 g 5    cranberry extract 425 mg cap Take 425 mg by mouth daily. Family History:  Family History   Problem Relation Age of Onset    Diabetes Mother     Cervical Cancer Mother     Arthritis-rheumatoid Mother     Hypertension Father     Diabetes Father     Thyroid Disease Sister     Colon Polyps Brother        Social History:  Social History     Tobacco Use    Smoking status: Never    Smokeless tobacco: Never   Vaping Use    Vaping Use: Never used   Substance Use Topics    Alcohol use: No    Drug use: No       Allergies: Allergies   Allergen Reactions    Aspirin Rash and Unknown (comments)    Bactrim [Sulfamethoxazole-Trimethoprim] Rash and Unknown (comments)    Bromfenac Rash and Unknown (comments)    Cefepime Other (comments)     Neurological reaction    Ceftriaxone Rash    Copper Rash    Ibuprofen Rash and Unknown (comments)    Ketorolac Tromethamine Rash and Unknown (comments)    Relafen [Nabumetone] Rash and Unknown (comments)    Rifampin Rash and Unknown (comments)    Vancomycin Rash and Unknown (comments)     Pt reports causes itching, takes benadryl prior to use. Pt denies anaphylaxis. Requires benadryl with each vancomycin dose       All the above components of the past  history are auto-populated from the electronic record.   They have been reviewed and the patient has been interviewed for any pertinent past history that pertains to the patient's chief complaint and reason for visit. Not all pre-populated components may be accurate at the time this note was generated. Review of Systems   Review of Systems   Constitutional:  Positive for chills and fatigue. Negative for fever. HENT:  Negative for congestion, ear pain, rhinorrhea, sore throat and trouble swallowing. Eyes:  Negative for visual disturbance. Respiratory:  Negative for cough, chest tightness and shortness of breath. Cardiovascular:  Positive for chest pain. Negative for palpitations. Gastrointestinal:  Positive for nausea. Negative for abdominal pain, blood in stool, constipation, diarrhea and vomiting. Genitourinary:  Positive for difficulty urinating and dysuria. Negative for decreased urine volume and frequency. Musculoskeletal:  Negative for back pain and neck pain. Skin:  Negative for color change and rash. Neurological:  Positive for weakness, light-headedness and headaches. Negative for dizziness. Physical Exam   Physical Exam  Vitals and nursing note reviewed. Constitutional:       General: She is not in acute distress. Appearance: She is well-developed. She is not ill-appearing. HENT:      Head: Normocephalic. Eyes:      Conjunctiva/sclera: Conjunctivae normal.   Cardiovascular:      Rate and Rhythm: Normal rate and regular rhythm. Arteriovenous access: Left arteriovenous access is present. Pulmonary:      Effort: Pulmonary effort is normal. No accessory muscle usage or respiratory distress. Abdominal:      General: There is no distension. Musculoskeletal:      Cervical back: Normal range of motion. Skin:     General: Skin is warm and dry. Neurological:      Mental Status: She is alert and oriented to person, place, and time.        Diagnostic Study Results     Labs -     Recent Results (from the past 24 hour(s))   EKG, 12 LEAD, INITIAL    Collection Time: 11/21/22  1:51 PM Result Value Ref Range    Ventricular Rate 85 BPM    Atrial Rate 84 BPM    P-R Interval 193 ms    QRS Duration 93 ms    Q-T Interval 418 ms    QTC Calculation (Bezet) 497 ms    Calculated P Axis 70 degrees    Calculated R Axis 74 degrees    Calculated T Axis 44 degrees    Diagnosis       Sinus rhythm  Borderline prolonged QT interval    Confirmed by JAVIER Kirkland ([id number]) on 11/21/2022 5:28:16 PM     CBC WITH AUTOMATED DIFF    Collection Time: 11/21/22  3:55 PM   Result Value Ref Range    WBC 7.0 4.6 - 13.2 K/uL    RBC 3.42 (L) 4.20 - 5.30 M/uL    HGB 11.0 (L) 12.0 - 16.0 g/dL    HCT 35.5 35.0 - 45.0 %    .8 (H) 78.0 - 100.0 FL    MCH 32.2 24.0 - 34.0 PG    MCHC 31.0 31.0 - 37.0 g/dL    RDW 15.8 (H) 11.6 - 14.5 %    PLATELET 544 073 - 915 K/uL    MPV 10.3 9.2 - 11.8 FL    NRBC 0.0 0.0  WBC    ABSOLUTE NRBC 0.00 0.00 - 0.01 K/uL    NEUTROPHILS 67 40 - 73 %    LYMPHOCYTES 20 (L) 21 - 52 %    MONOCYTES 11 (H) 3 - 10 %    EOSINOPHILS 1 0 - 5 %    BASOPHILS 1 0 - 2 %    IMMATURE GRANULOCYTES 0 0 - 0.5 %    ABS. NEUTROPHILS 4.7 1.8 - 8.0 K/UL    ABS. LYMPHOCYTES 1.4 0.9 - 3.6 K/UL    ABS. MONOCYTES 0.8 0.05 - 1.2 K/UL    ABS. EOSINOPHILS 0.1 0.0 - 0.4 K/UL    ABS. BASOPHILS 0.1 0.0 - 0.1 K/UL    ABS. IMM. GRANS. 0.0 0.00 - 0.04 K/UL    DF AUTOMATED     METABOLIC PANEL, COMPREHENSIVE    Collection Time: 11/21/22  3:55 PM   Result Value Ref Range    Sodium 136 136 - 145 mmol/L    Potassium 6.0 (H) 3.5 - 5.5 mmol/L    Chloride 106 100 - 111 mmol/L    CO2 21 21 - 32 mmol/L    Anion gap 9 3.0 - 18.0 mmol/L    Glucose 73 (L) 74 - 99 mg/dL    BUN 68 (H) 7 - 18 mg/dL    Creatinine 9.76 (H) 0.60 - 1.30 mg/dL    BUN/Creatinine ratio 7 (L) 12 - 20      eGFR 4 (L) >60 ml/min/1.73m2    Calcium 7.8 (L) 8.5 - 10.1 mg/dL    Bilirubin, total 0.8 0.2 - 1.0 mg/dL    AST (SGOT) 17 10 - 38 U/L    ALT (SGPT) 21 13 - 56 U/L    Alk.  phosphatase 148 (H) 45 - 117 U/L    Protein, total 7.3 6.4 - 8.2 g/dL    Albumin 3.4 3.4 - 5.0 g/dL Globulin 3.9 2.0 - 4.0 g/dL    A-G Ratio 0.9 0.8 - 1.7     MAGNESIUM    Collection Time: 11/21/22  3:55 PM   Result Value Ref Range    Magnesium 2.1 1.6 - 2.6 mg/dL   LIPASE    Collection Time: 11/21/22  3:55 PM   Result Value Ref Range    Lipase 221 73 - 393 U/L   TROPONIN-HIGH SENSITIVITY    Collection Time: 11/21/22  3:55 PM   Result Value Ref Range    Troponin-High Sensitivity 486 (HH) 0 - 54 ng/L   URINALYSIS W/ RFLX MICROSCOPIC    Collection Time: 11/21/22  5:21 PM   Result Value Ref Range    Color Yellow      Appearance Clear      Specific gravity 1.011 1.005 - 1.030      pH (UA) 8.0 5.0 - 8.0      Protein 300 (A) Negative mg/dL    Glucose Negative Negative mg/dL    Ketone Negative Negative mg/dL    Bilirubin Negative Negative      Blood Trace (A) Negative      Urobilinogen 0.2 0.2 - 1.0 EU/dL    Nitrites Negative Negative      Leukocyte Esterase Negative Negative     URINE MICROSCOPIC    Collection Time: 11/21/22  5:21 PM   Result Value Ref Range    WBC 0-4 0 - 4 /hpf    RBC 0-5 0 - 2 /hpf    Epithelial cells Few 0 - 20 /lpf    Bacteria 1+ (A) None /hpf   GLUCOSE, POC    Collection Time: 11/21/22  7:41 PM   Result Value Ref Range    Glucose (POC) 77 70 - 110 mg/dL    Performed by Patricia Jordan    TROPONIN-HIGH SENSITIVITY    Collection Time: 11/21/22  8:58 PM   Result Value Ref Range    Troponin-High Sensitivity 511 (HH) 0 - 54 ng/L   GLUCOSE, POC    Collection Time: 11/21/22  9:47 PM   Result Value Ref Range    Glucose (POC) 40 (LL) 70 - 110 mg/dL    Performed by Tayler De La Torre    GLUCOSE, POC    Collection Time: 11/21/22 10:31 PM   Result Value Ref Range    Glucose (POC) 161 (H) 70 - 110 mg/dL    Performed by Patricia Jordan    CBC WITH AUTOMATED DIFF    Collection Time: 11/22/22  4:18 AM   Result Value Ref Range    WBC 5.0 4.6 - 13.2 K/uL    RBC 3.26 (L) 4.20 - 5.30 M/uL    HGB 10.5 (L) 12.0 - 16.0 g/dL    HCT 35.4 35.0 - 45.0 %    .6 (H) 78.0 - 100.0 FL    MCH 32.2 24.0 - 34.0 PG    MCHC 29.7 (L) 31.0 - 37.0 g/dL    RDW 15.8 (H) 11.6 - 14.5 %    PLATELET 949 761 - 208 K/uL    MPV 10.1 9.2 - 11.8 FL    NRBC 0.0 0.0  WBC    ABSOLUTE NRBC 0.00 0.00 - 0.01 K/uL    NEUTROPHILS 64 40 - 73 %    LYMPHOCYTES 20 (L) 21 - 52 %    MONOCYTES 14 (H) 3 - 10 %    EOSINOPHILS 1 0 - 5 %    BASOPHILS 1 0 - 2 %    IMMATURE GRANULOCYTES 0 0 - 0.5 %    ABS. NEUTROPHILS 3.2 1.8 - 8.0 K/UL    ABS. LYMPHOCYTES 1.0 0.9 - 3.6 K/UL    ABS. MONOCYTES 0.7 0.05 - 1.2 K/UL    ABS. EOSINOPHILS 0.1 0.0 - 0.4 K/UL    ABS. BASOPHILS 0.1 0.0 - 0.1 K/UL    ABS. IMM.  GRANS. 0.0 0.00 - 0.04 K/UL    DF AUTOMATED     MAGNESIUM    Collection Time: 11/22/22  4:18 AM   Result Value Ref Range    Magnesium 2.1 1.6 - 2.6 mg/dL   METABOLIC PANEL, BASIC    Collection Time: 11/22/22  4:18 AM   Result Value Ref Range    Sodium 135 (L) 136 - 145 mmol/L    Potassium 5.2 3.5 - 5.5 mmol/L    Chloride 103 100 - 111 mmol/L    CO2 23 21 - 32 mmol/L    Anion gap 9 3.0 - 18.0 mmol/L    Glucose 97 74 - 99 mg/dL    BUN 67 (H) 7 - 18 mg/dL    Creatinine 9.67 (H) 0.60 - 1.30 mg/dL    BUN/Creatinine ratio 7 (L) 12 - 20      eGFR 4 (L) >60 ml/min/1.73m2    Calcium 8.1 (L) 8.5 - 10.1 mg/dL   PHOSPHORUS    Collection Time: 11/22/22  4:18 AM   Result Value Ref Range    Phosphorus 6.1 (H) 2.5 - 4.9 mg/dL   TSH 3RD GENERATION    Collection Time: 11/22/22  4:18 AM   Result Value Ref Range    TSH 10.50 (H) 0.36 - 3.74 uIU/mL   TROPONIN-HIGH SENSITIVITY    Collection Time: 11/22/22  4:18 AM   Result Value Ref Range    Troponin-High Sensitivity 315 (HH) 0 - 54 ng/L   EKG, 12 LEAD, INITIAL    Collection Time: 11/22/22  4:46 AM   Result Value Ref Range    Ventricular Rate 85 BPM    Atrial Rate 85 BPM    P-R Interval 188 ms    QRS Duration 92 ms    Q-T Interval 410 ms    QTC Calculation (Bezet) 488 ms    Calculated P Axis 40 degrees    Calculated R Axis 73 degrees    Calculated T Axis 55 degrees    Diagnosis Sinus rhythm  Borderline prolonged QT interval          Radiologic Studies - XR CHEST PORT   Final Result      Mild interval increase in pulmonary vascular congestion without superimposed   acute abnormality. CT Results  (Last 48 hours)      None          CXR Results  (Last 48 hours)                 11/21/22 1615  XR CHEST PORT Final result    Impression:      Mild interval increase in pulmonary vascular congestion without superimposed   acute abnormality. Narrative:  EXAM: XR CHEST PORT       CLINICAL INDICATION/HISTORY: fatigue, chest pain   -Additional: None       COMPARISON: Radiographs 10/22/2022       TECHNIQUE: Frontal view of the chest       _______________       FINDINGS:       HEART AND MEDIASTINUM: Normal cardiac size and mediastinal contours. LUNGS AND PLEURAL SPACES: Mild increase in pulmonary vascular prominence. No   pneumothorax or definite pleural effusion. No dense consolidation. BONY THORAX AND SOFT TISSUES: No acute osseous abnormality       _______________                     Medical Decision Making     I reviewed the vital signs, available nursing notes, past medical history, past surgical history, family history and social history. Vital Signs-I have reviewed the vital signs that have been made available during the patient's emergency department visit. The vital signs auto-populated below are obtained mostly by electronic means through monitoring devices that have been downloaded into the patient's chart by the nursing staff. Some vital signs are not downloaded into the chart until after the patient has been discharged and this note has been completed, therefore some vital signs may not be available to the physician for review prior to patient's discharge or admission. The physician has reviewed the patient's triage vital signs, monitored the electronic monitoring devices remotely for any significant vital sign abnormalities, and have reviewed vital signs prior to discharge.   Some vital signs reviewed at bedside or remotely utilizing electronic monitoring devices may be different than the vital signs downloaded into the electronic medical record. Some vital signs may be erroneous and inaccurate since they are obtained by electronic monitoring devices, and not all vital signs are verified for accuracy by nursing staff prior to downloading into the patient's chart. Patient Vitals for the past 24 hrs:   Temp Pulse Resp BP SpO2   11/22/22 0421 97.5 °F (36.4 °C) 82 18 123/75 95 %   11/22/22 0400 -- 82 -- -- --   11/22/22 0000 -- 77 -- -- --   11/21/22 2303 98 °F (36.7 °C) 77 18 (!) 163/59 99 %   11/21/22 2214 -- 88 18 (!) 170/81 98 %   11/21/22 2004 -- 80 18 (!) 170/84 98 %   11/21/22 1838 -- 87 20 (!) 163/79 98 %   11/21/22 1737 -- 89 16 (!) 161/77 97 %   11/21/22 1637 -- 84 16 (!) 164/76 98 %   11/21/22 1537 -- 82 18 136/67 98 %   11/21/22 1437 -- 83 16 (!) 173/70 97 %   11/21/22 1417 -- 84 16 (!) 185/88 97 %   11/21/22 1357 -- 92 20 (!) 167/76 96 %   11/21/22 1348 -- -- -- (!) 161/76 --   11/21/22 1341 98.9 °F (37.2 °C) 83 20 -- 98 %         Records Reviewed: Nursing notes for today's visit have been reviewed. I have also reviewed most recent medical records pertinent to today's complaints, if available in our medical record system. I have also reviewed all labs and imaging results from previous results in comparison to results obtained today. If an EKG was obtained today, it has been compared to previous EKGs, if available. If arriving via EMS, the EMS report has been reviewed if made available to us within the patient's time in the emergency department. ED Course and Medical Decision Making:       MDM  Number of Diagnoses or Management Options  Acute hyperkalemia  Elevated troponin  ESRD needing dialysis (HCC)  Hypervolemia, unspecified hypervolemia type  Diagnosis management comments: Patient is presenting with fatigue and chest pain. She has end-stage renal disease on dialysis. Has missed dialysis today. Potassium is 6.0.   Will treat with calcium gluconate, insulin, dextrose, sodium bicarb. She has no T wave changes or EKG changes related to this hyperkalemia. BUN and creatinine is elevated today as I would expect with missing her dialysis today. No signs of volume overload. No pulmonary edema. No hypoxia. Patient has no ischemic changes on EKG. High-sensitivity troponin returns at 486. This could be secondary to renal disease and will need to be repeated to assess any change. If increasing this may represent acute coronary syndrome. Patient can be dialyzed in the morning.        Amount and/or Complexity of Data Reviewed  Clinical lab tests: ordered and reviewed  Tests in the radiology section of CPT®: ordered and reviewed  Review and summarize past medical records: yes  Discuss the patient with other providers: yes  Independent visualization of images, tracings, or specimens: yes    Risk of Complications, Morbidity, and/or Mortality  Presenting problems: moderate  Diagnostic procedures: moderate  Management options: moderate             Orders Placed This Encounter    MECHANICAL PROPHYLAXIS IS CONTRAINDICATED Other, please document Already on Anticoagulation    ANTICOAGULANT THERAPY IS CONTRAINDICATED Other, please document    XR CHEST PORT    CBC WITH AUTOMATED DIFF    COMPREHENSIVE METABOLIC PANEL    MAGNESIUM    LIPASE    URINALYSIS W/ RFLX MICROSCOPIC    TROPONIN-HIGH SENSITIVITY    TROPONIN-HIGH SENSITIVITY    URINE MICROSCOPIC    CBC WITH AUTOMATED DIFF    MAGNESIUM    METABOLIC PANEL, BASIC    PHOSPHORUS    TSH 3RD GENERATION    TROPONIN-HIGH SENSITIVITY    ADULT DIET Regular; Low Potassium (Less than 3000 mg/day)    EKG NOTEWRITER(ASAP ONLY)    CARDIAC MONITORING    VITAL SIGNS    UP AD KEIRA    FULL CODE    IP CONSULT TO NEPHROLOGY    Contact Isolation    GLUCOSE, POC    GLUCOSE, POC    GLUCOSE, POC    EKG, 12 LEAD, INITIAL    EKG, 12 LEAD, INITIAL    SALINE LOCK IV ONE TIME STAT    sodium chloride 0.9 % bolus infusion 500 mL    ondansetron (ZOFRAN) injection 4 mg    calcium gluconate 2 g in 0.9% sodium chloride 100 mL IVPB    insulin regular (NOVOLIN R, HUMULIN R) injection 10 Units    DISCONTD: dextrose (D50W) injection syrg 50 g    sodium bicarbonate 8.4 % (1 mEq/mL) injection 50 mEq    dextrose 10% infusion 250 mL    sodium polystyrene (KAYEXALATE) 15 gram/60 mL oral suspension 15 g    amiodarone (CORDARONE) tablet 200 mg    atorvastatin (LIPITOR) tablet 10 mg    sevelamer carbonate (RENVELA) tab 800 mg    predniSONE (DELTASONE) tablet 5 mg    losartan (COZAAR) tablet 50 mg    montelukast (SINGULAIR) tablet 10 mg    levothyroxine (SYNTHROID) tablet 75 mcg    famotidine (PEPCID) tablet 20 mg    cinacalcet (SENSIPAR) tablet 30 mg    carvediloL (COREG) tablet 25 mg    apixaban (ELIQUIS) tablet 2.5 mg    amLODIPine (NORVASC) tablet 10 mg    hydrALAZINE (APRESOLINE) 20 mg/mL injection 20 mg    sodium chloride (NS) flush 5-40 mL    sodium chloride (NS) flush 5-40 mL    OR Linked Order Group     acetaminophen (TYLENOL) tablet 650 mg     acetaminophen (TYLENOL) suppository 650 mg    polyethylene glycol (MIRALAX) packet 17 g    OR Linked Order Group     ondansetron (ZOFRAN ODT) tablet 4 mg     ondansetron (ZOFRAN) injection 4 mg    dextrose 10 % infusion 250 mL    albuterol-ipratropium (DUO-NEB) 2.5 MG-0.5 MG/3 ML    DISCONTD: hydrALAZINE (APRESOLINE) 20 mg/mL injection 20 mg    valGANciclovir (VALCYTE) tablet 900 mg    nitroglycerin (NITROSTAT) tablet 0.4 mg    DISCONTD: nitroglycerin (NITROSTAT) 0.4 mg tablet    IP CONSULT TO CARDIOLOGY    INITIAL PHYSICIAN ORDER: INPATIENT Telemetry; Yes; 3.  Patient receiving treatment that can only be provided in an inpatient setting (further clarification in H&P documentation)       EKG    Date/Time: 11/21/2022 1:51 PM  Performed by: Denis Andrea MD  Authorized by: Denis Andrea MD     ECG reviewed by ED Physician in the absence of a cardiologist: yes    Rate:     ECG rate:  85    ECG rate assessment: normal    Rhythm:     Rhythm: sinus rhythm    Ectopy:     Ectopy: none    QRS:     QRS axis:  Normal    QRS intervals:  Normal    QRS conduction: normal    ST segments:     ST segments:  Normal  T waves:     T waves: normal        Critical Care Time:   I have spent 45 minutes of critical care time in evaluating and treating this patient. This includes time spent at bedside, time with family and decision makers, documentation, review of labs and imaging, and/or consultation with specialists. It does not include time spent on separately billed procedures. This patient presents with a critical illness or injury that acutely impairs one or more vital organ systems such that there is a high probability of imminent or life threatening deterioration in the patient's condition. This case involved decision making of high complexity to assess, manipulate, and support vital organ system failure and/or to prevent further life threatening deterioration of the patient's condition. Failure to initiate these interventions on an urgent basis would likely result in sudden, clinically significant or life threatening deterioration in the patient's condition. Abnormal findings supporting critical care: Hyperkalemia  Interventions to support critical care: IV calcium gluconate to stabilize cardiac membranes and prevent dysrhythmias, insulin, dextrose, last sodium bicarb  Failure to intervene may result in: Cardiac dysrhythmias resulting in cardiac failure and death      Disposition:  Admit        Diagnosis     Clinical Impression:   1. Hypervolemia, unspecified hypervolemia type    2. Acute hyperkalemia    3. Elevated troponin    4.  ESRD needing dialysis ([facility] [address]

## 2022-11-21 NOTE — ED TRIAGE NOTES
EMS called to residence for pt that c/o feeling weak and not able to go to dialysis today.  Pt states that she has c/o CP, not feeling well since yesterday

## 2022-11-22 ENCOUNTER — APPOINTMENT (OUTPATIENT)
Dept: NON INVASIVE DIAGNOSTICS | Age: 67
DRG: 640 | End: 2022-11-22
Attending: INTERNAL MEDICINE
Payer: MEDICARE

## 2022-11-22 LAB
ANION GAP SERPL CALC-SCNC: 9 MMOL/L (ref 3–18)
ATRIAL RATE: 85 BPM
BASOPHILS # BLD: 0.1 K/UL (ref 0–0.1)
BASOPHILS NFR BLD: 1 % (ref 0–2)
BUN SERPL-MCNC: 67 MG/DL (ref 7–18)
BUN/CREAT SERPL: 7 (ref 12–20)
CA-I BLD-MCNC: 8.1 MG/DL (ref 8.5–10.1)
CALCULATED P AXIS, ECG09: 40 DEGREES
CALCULATED R AXIS, ECG10: 73 DEGREES
CALCULATED T AXIS, ECG11: 55 DEGREES
CHLORIDE SERPL-SCNC: 103 MMOL/L (ref 100–111)
CO2 SERPL-SCNC: 23 MMOL/L (ref 21–32)
CREAT SERPL-MCNC: 9.67 MG/DL (ref 0.6–1.3)
DIAGNOSIS, 93000: NORMAL
DIFFERENTIAL METHOD BLD: ABNORMAL
ECHO AO ASC DIAM: 3.2 CM
ECHO AO ASCENDING AORTA INDEX: 1.93 CM/M2
ECHO AO ROOT DIAM: 3.5 CM
ECHO AO ROOT INDEX: 2.11 CM/M2
ECHO AV AREA PEAK VELOCITY: 4.4 CM2
ECHO AV AREA VTI: 3.6 CM2
ECHO AV AREA/BSA PEAK VELOCITY: 2.7 CM2/M2
ECHO AV AREA/BSA VTI: 2.2 CM2/M2
ECHO AV MEAN GRADIENT: 5 MMHG
ECHO AV MEAN VELOCITY: 1 M/S
ECHO AV PEAK GRADIENT: 9 MMHG
ECHO AV PEAK VELOCITY: 1.5 M/S
ECHO AV VELOCITY RATIO: 1
ECHO AV VTI: 34.4 CM
ECHO EST RA PRESSURE: 3 MMHG
ECHO IVC PROX: 1.8 CM
ECHO LA AREA 2C: 23.3 CM2
ECHO LA AREA 4C: 23.5 CM2
ECHO LA DIAMETER INDEX: 2.29 CM/M2
ECHO LA DIAMETER: 3.8 CM
ECHO LA MAJOR AXIS: 6 CM
ECHO LA MINOR AXIS: 5.6 CM
ECHO LA TO AORTIC ROOT RATIO: 1.09
ECHO LA VOL BP: 77 ML (ref 22–52)
ECHO LA VOL/BSA BIPLANE: 46 ML/M2 (ref 16–34)
ECHO LV E' LATERAL VELOCITY: 8 CM/S
ECHO LV E' SEPTAL VELOCITY: 7 CM/S
ECHO LV EDV A2C: 47 ML
ECHO LV EDV A4C: 53 ML
ECHO LV EDV INDEX A4C: 32 ML/M2
ECHO LV EDV NDEX A2C: 28 ML/M2
ECHO LV EJECTION FRACTION A2C: 59 %
ECHO LV EJECTION FRACTION A4C: 61 %
ECHO LV EJECTION FRACTION BIPLANE: 62 % (ref 55–100)
ECHO LV ESV A2C: 19 ML
ECHO LV ESV A4C: 21 ML
ECHO LV ESV INDEX A2C: 11 ML/M2
ECHO LV ESV INDEX A4C: 13 ML/M2
ECHO LV FRACTIONAL SHORTENING: 31 % (ref 28–44)
ECHO LV INTERNAL DIMENSION DIASTOLE INDEX: 2.71 CM/M2
ECHO LV INTERNAL DIMENSION DIASTOLIC: 4.5 CM (ref 3.9–5.3)
ECHO LV INTERNAL DIMENSION SYSTOLIC INDEX: 1.87 CM/M2
ECHO LV INTERNAL DIMENSION SYSTOLIC: 3.1 CM
ECHO LV IVSD: 1.5 CM (ref 0.6–0.9)
ECHO LV MASS 2D: 248.4 G (ref 67–162)
ECHO LV MASS INDEX 2D: 149.7 G/M2 (ref 43–95)
ECHO LV POSTERIOR WALL DIASTOLIC: 1.3 CM (ref 0.6–0.9)
ECHO LV RELATIVE WALL THICKNESS RATIO: 0.58
ECHO LVOT AREA: 4.5 CM2
ECHO LVOT AV VTI INDEX: 0.8
ECHO LVOT DIAM: 2.4 CM
ECHO LVOT MEAN GRADIENT: 4 MMHG
ECHO LVOT PEAK GRADIENT: 8 MMHG
ECHO LVOT PEAK VELOCITY: 1.5 M/S
ECHO LVOT STROKE VOLUME INDEX: 75.2 ML/M2
ECHO LVOT SV: 124.8 ML
ECHO LVOT VTI: 27.6 CM
ECHO MV A VELOCITY: 0.64 M/S
ECHO MV AREA VTI: 5.8 CM2
ECHO MV E DECELERATION TIME (DT): 187 MS
ECHO MV E VELOCITY: 0.69 M/S
ECHO MV E/A RATIO: 1.08
ECHO MV E/E' LATERAL: 8.63
ECHO MV E/E' RATIO (AVERAGED): 9.24
ECHO MV E/E' SEPTAL: 9.86
ECHO MV LVOT VTI INDEX: 0.79
ECHO MV MAX VELOCITY: 0.8 M/S
ECHO MV MEAN GRADIENT: 2 MMHG
ECHO MV MEAN VELOCITY: 0.6 M/S
ECHO MV PEAK GRADIENT: 3 MMHG
ECHO MV VTI: 21.7 CM
ECHO PV MAX VELOCITY: 1.1 M/S
ECHO PV PEAK GRADIENT: 5 MMHG
ECHO RA AREA 4C: 19.1 CM2
ECHO RA END SYSTOLIC VOLUME APICAL 4 CHAMBER INDEX BSA: 34 ML/M2
ECHO RA VOLUME: 56 ML
ECHO RIGHT VENTRICULAR SYSTOLIC PRESSURE (RVSP): 32 MMHG
ECHO RV BASAL DIMENSION: 4.6 CM
ECHO RV FREE WALL STRAIN: -18 %
ECHO RV LONGITUDINAL DIMENSION: 6.6 CM
ECHO RV MID DIMENSION: 3.6 CM
ECHO RV TAPSE: 2.1 CM (ref 1.7–?)
ECHO TV REGURGITANT MAX VELOCITY: 2.69 M/S
ECHO TV REGURGITANT PEAK GRADIENT: 29 MMHG
EOSINOPHIL # BLD: 0.1 K/UL (ref 0–0.4)
EOSINOPHIL NFR BLD: 1 % (ref 0–5)
ERYTHROCYTE [DISTWIDTH] IN BLOOD BY AUTOMATED COUNT: 15.8 % (ref 11.6–14.5)
GLUCOSE BLD STRIP.AUTO-MCNC: 124 MG/DL (ref 70–110)
GLUCOSE BLD STRIP.AUTO-MCNC: 81 MG/DL (ref 70–110)
GLUCOSE BLD STRIP.AUTO-MCNC: 92 MG/DL (ref 70–110)
GLUCOSE BLD STRIP.AUTO-MCNC: 96 MG/DL (ref 70–110)
GLUCOSE SERPL-MCNC: 97 MG/DL (ref 74–99)
HCT VFR BLD AUTO: 35.4 % (ref 35–45)
HGB BLD-MCNC: 10.5 G/DL (ref 12–16)
IMM GRANULOCYTES # BLD AUTO: 0 K/UL (ref 0–0.04)
IMM GRANULOCYTES NFR BLD AUTO: 0 % (ref 0–0.5)
LYMPHOCYTES # BLD: 1 K/UL (ref 0.9–3.6)
LYMPHOCYTES NFR BLD: 20 % (ref 21–52)
MAGNESIUM SERPL-MCNC: 2.1 MG/DL (ref 1.6–2.6)
MCH RBC QN AUTO: 32.2 PG (ref 24–34)
MCHC RBC AUTO-ENTMCNC: 29.7 G/DL (ref 31–37)
MCV RBC AUTO: 108.6 FL (ref 78–100)
MONOCYTES # BLD: 0.7 K/UL (ref 0.05–1.2)
MONOCYTES NFR BLD: 14 % (ref 3–10)
NEUTS SEG # BLD: 3.2 K/UL (ref 1.8–8)
NEUTS SEG NFR BLD: 64 % (ref 40–73)
NRBC # BLD: 0 K/UL (ref 0–0.01)
NRBC BLD-RTO: 0 PER 100 WBC
P-R INTERVAL, ECG05: 188 MS
PERFORMED BY, TECHID: ABNORMAL
PERFORMED BY, TECHID: NORMAL
PHOSPHATE SERPL-MCNC: 6.1 MG/DL (ref 2.5–4.9)
PLATELET # BLD AUTO: 138 K/UL (ref 135–420)
PMV BLD AUTO: 10.1 FL (ref 9.2–11.8)
POTASSIUM SERPL-SCNC: 5.2 MMOL/L (ref 3.5–5.5)
Q-T INTERVAL, ECG07: 410 MS
QRS DURATION, ECG06: 92 MS
QTC CALCULATION (BEZET), ECG08: 488 MS
RBC # BLD AUTO: 3.26 M/UL (ref 4.2–5.3)
SODIUM SERPL-SCNC: 135 MMOL/L (ref 136–145)
TROPONIN-HIGH SENSITIVITY: 315 NG/L (ref 0–54)
TSH SERPL DL<=0.05 MIU/L-ACNC: 10.5 UIU/ML (ref 0.36–3.74)
VENTRICULAR RATE, ECG03: 85 BPM
WBC # BLD AUTO: 5 K/UL (ref 4.6–13.2)

## 2022-11-22 PROCEDURE — 83735 ASSAY OF MAGNESIUM: CPT

## 2022-11-22 PROCEDURE — 93356 MYOCRD STRAIN IMG SPCKL TRCK: CPT

## 2022-11-22 PROCEDURE — 84484 ASSAY OF TROPONIN QUANT: CPT

## 2022-11-22 PROCEDURE — 74011636637 HC RX REV CODE- 636/637: Performed by: INTERNAL MEDICINE

## 2022-11-22 PROCEDURE — 82962 GLUCOSE BLOOD TEST: CPT

## 2022-11-22 PROCEDURE — 74011250636 HC RX REV CODE- 250/636: Performed by: INTERNAL MEDICINE

## 2022-11-22 PROCEDURE — 84100 ASSAY OF PHOSPHORUS: CPT

## 2022-11-22 PROCEDURE — 74011000250 HC RX REV CODE- 250: Performed by: INTERNAL MEDICINE

## 2022-11-22 PROCEDURE — 36415 COLL VENOUS BLD VENIPUNCTURE: CPT

## 2022-11-22 PROCEDURE — 85025 COMPLETE CBC W/AUTO DIFF WBC: CPT

## 2022-11-22 PROCEDURE — 93005 ELECTROCARDIOGRAM TRACING: CPT

## 2022-11-22 PROCEDURE — 74011250637 HC RX REV CODE- 250/637: Performed by: INTERNAL MEDICINE

## 2022-11-22 PROCEDURE — 80048 BASIC METABOLIC PNL TOTAL CA: CPT

## 2022-11-22 PROCEDURE — 65270000029 HC RM PRIVATE

## 2022-11-22 PROCEDURE — 74011250637 HC RX REV CODE- 250/637

## 2022-11-22 PROCEDURE — 84443 ASSAY THYROID STIM HORMONE: CPT

## 2022-11-22 RX ORDER — NITROGLYCERIN 0.4 MG/1
TABLET SUBLINGUAL
Status: DISCONTINUED
Start: 2022-11-22 | End: 2022-11-22 | Stop reason: WASHOUT

## 2022-11-22 RX ORDER — NITROGLYCERIN 0.4 MG/1
0.4 TABLET SUBLINGUAL AS NEEDED
Status: DISCONTINUED | OUTPATIENT
Start: 2022-11-22 | End: 2022-11-23 | Stop reason: HOSPADM

## 2022-11-22 RX ORDER — LEVOTHYROXINE SODIUM 125 UG/1
125 TABLET ORAL
Status: DISCONTINUED | OUTPATIENT
Start: 2022-11-23 | End: 2022-11-23 | Stop reason: HOSPADM

## 2022-11-22 RX ORDER — SODIUM CHLORIDE 9 MG/ML
25 INJECTION, SOLUTION INTRAVENOUS
Status: DISCONTINUED | OUTPATIENT
Start: 2022-11-22 | End: 2022-11-23 | Stop reason: HOSPADM

## 2022-11-22 RX ADMIN — ATORVASTATIN CALCIUM 10 MG: 10 TABLET, FILM COATED ORAL at 09:29

## 2022-11-22 RX ADMIN — PREDNISONE 5 MG: 5 TABLET ORAL at 09:29

## 2022-11-22 RX ADMIN — CARVEDILOL 25 MG: 12.5 TABLET, FILM COATED ORAL at 16:30

## 2022-11-22 RX ADMIN — Medication 0.4 MG: at 04:44

## 2022-11-22 RX ADMIN — CARVEDILOL 25 MG: 12.5 TABLET, FILM COATED ORAL at 09:29

## 2022-11-22 RX ADMIN — MONTELUKAST 10 MG: 10 TABLET, FILM COATED ORAL at 09:29

## 2022-11-22 RX ADMIN — SODIUM CHLORIDE, PRESERVATIVE FREE 10 ML: 5 INJECTION INTRAVENOUS at 14:15

## 2022-11-22 RX ADMIN — FAMOTIDINE 20 MG: 20 TABLET, FILM COATED ORAL at 09:29

## 2022-11-22 RX ADMIN — AMLODIPINE BESYLATE 10 MG: 5 TABLET ORAL at 09:29

## 2022-11-22 RX ADMIN — SEVELAMER CARBONATE 800 MG: 800 TABLET, FILM COATED ORAL at 09:29

## 2022-11-22 RX ADMIN — APIXABAN 2.5 MG: 2.5 TABLET, FILM COATED ORAL at 17:00

## 2022-11-22 RX ADMIN — ONDANSETRON 4 MG: 4 TABLET, ORALLY DISINTEGRATING ORAL at 16:30

## 2022-11-22 RX ADMIN — SODIUM CHLORIDE, PRESERVATIVE FREE 10 ML: 5 INJECTION INTRAVENOUS at 06:36

## 2022-11-22 RX ADMIN — AMIODARONE HYDROCHLORIDE 200 MG: 200 TABLET ORAL at 09:29

## 2022-11-22 RX ADMIN — NITROGLYCERIN 0.4 MG: 0.4 TABLET SUBLINGUAL at 04:44

## 2022-11-22 RX ADMIN — LOSARTAN POTASSIUM 50 MG: 25 TABLET, FILM COATED ORAL at 09:29

## 2022-11-22 RX ADMIN — LEVOTHYROXINE SODIUM 75 MCG: 0.07 TABLET ORAL at 06:36

## 2022-11-22 RX ADMIN — SODIUM CHLORIDE, PRESERVATIVE FREE 10 ML: 5 INJECTION INTRAVENOUS at 22:21

## 2022-11-22 RX ADMIN — SEVELAMER CARBONATE 800 MG: 800 TABLET, FILM COATED ORAL at 21:57

## 2022-11-22 RX ADMIN — CINACALCET HYDROCHLORIDE 30 MG: 30 TABLET, FILM COATED ORAL at 09:29

## 2022-11-22 RX ADMIN — APIXABAN 2.5 MG: 2.5 TABLET, FILM COATED ORAL at 09:29

## 2022-11-22 NOTE — PROGRESS NOTES
Progress Note    Patient: Corina Madrigal MRN: [id number]  SSN: [id number]    YOB: 1955  Age: 74 y.o. Sex: female      Admit Date: 11/21/2022    LOS: 1 day     Subjective:     -Seen today at bedside for f/up on chest pain, elevated trops and missed HD.  -pt denies any chest pain, sob, fever, chills,   -no n/v/d/constipation, abdominal pain or discomfort  -only concern is loss of appetitie and generalized weakness  -no overnight events reported by Rns  She is awaiting HD. Objective:     Vitals:    11/22/22 0748 11/22/22 0800 11/22/22 1150 11/22/22 1200   BP: (!) 150/75  (!) 159/67    Pulse: 78 77 76 70   Resp: 17  17    Temp: 97.7 °F (36.5 °C)  97.3 °F (36.3 °C)    SpO2: 100%  97%    Weight:       Height:            Intake and Output:  Current Shift: No intake/output data recorded. Last three shifts: 11/20 1901 - 11/22 0700  In: 500 [I.V.:500]  Out: 300 [Urine:300]    Physical Exam:   Physical Exam  Vitals and nursing note reviewed. Constitutional:       Appearance: Normal appearance. HENT:      Head: Normocephalic and atraumatic. Nose: Nose normal.      Mouth/Throat:      Mouth: Mucous membranes are moist.   Eyes:      Extraocular Movements: Extraocular movements intact. Pupils: Pupils are equal, round, and reactive to light. Cardiovascular:      Rate and Rhythm: Normal rate and regular rhythm. Pulses: Normal pulses. Heart sounds: Normal heart sounds. Pulmonary:      Effort: Pulmonary effort is normal.      Breath sounds: Normal breath sounds. Abdominal:      General: Abdomen is flat. Bowel sounds are normal.      Palpations: Abdomen is soft. Musculoskeletal:         General: Normal range of motion. Cervical back: Normal range of motion and neck supple. Comments: LUE AV fistula   Skin:     General: Skin is warm and dry. Capillary Refill: Capillary refill takes less than 2 seconds. Neurological:      General: No focal deficit present. Mental Status: She is alert and oriented to person, place, and time. Mental status is at baseline. Psychiatric:         Mood and Affect: Mood normal.         Behavior: Behavior normal.        Lab/Data Review:  Recent Results (from the past 24 hour(s))   EKG, 12 LEAD, INITIAL    Collection Time: 11/21/22  1:51 PM   Result Value Ref Range    Ventricular Rate 85 BPM    Atrial Rate 84 BPM    P-R Interval 193 ms    QRS Duration 93 ms    Q-T Interval 418 ms    QTC Calculation (Bezet) 497 ms    Calculated P Axis 70 degrees    Calculated R Axis 74 degrees    Calculated T Axis 44 degrees    Diagnosis       Sinus rhythm  Borderline prolonged QT interval    Confirmed by JAVIER Rosas ([id number]) on 11/21/2022 5:28:16 PM     CBC WITH AUTOMATED DIFF    Collection Time: 11/21/22  3:55 PM   Result Value Ref Range    WBC 7.0 4.6 - 13.2 K/uL    RBC 3.42 (L) 4.20 - 5.30 M/uL    HGB 11.0 (L) 12.0 - 16.0 g/dL    HCT 35.5 35.0 - 45.0 %    .8 (H) 78.0 - 100.0 FL    MCH 32.2 24.0 - 34.0 PG    MCHC 31.0 31.0 - 37.0 g/dL    RDW 15.8 (H) 11.6 - 14.5 %    PLATELET 251 653 - 458 K/uL    MPV 10.3 9.2 - 11.8 FL    NRBC 0.0 0.0  WBC    ABSOLUTE NRBC 0.00 0.00 - 0.01 K/uL    NEUTROPHILS 67 40 - 73 %    LYMPHOCYTES 20 (L) 21 - 52 %    MONOCYTES 11 (H) 3 - 10 %    EOSINOPHILS 1 0 - 5 %    BASOPHILS 1 0 - 2 %    IMMATURE GRANULOCYTES 0 0 - 0.5 %    ABS. NEUTROPHILS 4.7 1.8 - 8.0 K/UL    ABS. LYMPHOCYTES 1.4 0.9 - 3.6 K/UL    ABS. MONOCYTES 0.8 0.05 - 1.2 K/UL    ABS. EOSINOPHILS 0.1 0.0 - 0.4 K/UL    ABS. BASOPHILS 0.1 0.0 - 0.1 K/UL    ABS. IMM.  GRANS. 0.0 0.00 - 0.04 K/UL    DF AUTOMATED     METABOLIC PANEL, COMPREHENSIVE    Collection Time: 11/21/22  3:55 PM   Result Value Ref Range    Sodium 136 136 - 145 mmol/L    Potassium 6.0 (H) 3.5 - 5.5 mmol/L    Chloride 106 100 - 111 mmol/L    CO2 21 21 - 32 mmol/L    Anion gap 9 3.0 - 18.0 mmol/L    Glucose 73 (L) 74 - 99 mg/dL    BUN 68 (H) 7 - 18 mg/dL    Creatinine 9.76 (H) 0.60 - 1.30 mg/dL    BUN/Creatinine ratio 7 (L) 12 - 20      eGFR 4 (L) >60 ml/min/1.73m2    Calcium 7.8 (L) 8.5 - 10.1 mg/dL    Bilirubin, total 0.8 0.2 - 1.0 mg/dL    AST (SGOT) 17 10 - 38 U/L    ALT (SGPT) 21 13 - 56 U/L    Alk.  phosphatase 148 (H) 45 - 117 U/L    Protein, total 7.3 6.4 - 8.2 g/dL    Albumin 3.4 3.4 - 5.0 g/dL    Globulin 3.9 2.0 - 4.0 g/dL    A-G Ratio 0.9 0.8 - 1.7     MAGNESIUM    Collection Time: 11/21/22  3:55 PM   Result Value Ref Range    Magnesium 2.1 1.6 - 2.6 mg/dL   LIPASE    Collection Time: 11/21/22  3:55 PM   Result Value Ref Range    Lipase 221 73 - 393 U/L   TROPONIN-HIGH SENSITIVITY    Collection Time: 11/21/22  3:55 PM   Result Value Ref Range    Troponin-High Sensitivity 486 (HH) 0 - 54 ng/L   URINALYSIS W/ RFLX MICROSCOPIC    Collection Time: 11/21/22  5:21 PM   Result Value Ref Range    Color Yellow      Appearance Clear      Specific gravity 1.011 1.005 - 1.030      pH (UA) 8.0 5.0 - 8.0      Protein 300 (A) Negative mg/dL    Glucose Negative Negative mg/dL    Ketone Negative Negative mg/dL    Bilirubin Negative Negative      Blood Trace (A) Negative      Urobilinogen 0.2 0.2 - 1.0 EU/dL    Nitrites Negative Negative      Leukocyte Esterase Negative Negative     URINE MICROSCOPIC    Collection Time: 11/21/22  5:21 PM   Result Value Ref Range    WBC 0-4 0 - 4 /hpf    RBC 0-5 0 - 2 /hpf    Epithelial cells Few 0 - 20 /lpf    Bacteria 1+ (A) None /hpf   GLUCOSE, POC    Collection Time: 11/21/22  7:41 PM   Result Value Ref Range    Glucose (POC) 77 70 - 110 mg/dL    Performed by Renard Martinez    TROPONIN-HIGH SENSITIVITY    Collection Time: 11/21/22  8:58 PM   Result Value Ref Range    Troponin-High Sensitivity 511 (HH) 0 - 54 ng/L   GLUCOSE, POC    Collection Time: 11/21/22  9:47 PM   Result Value Ref Range    Glucose (POC) 40 (LL) 70 - 110 mg/dL    Performed by Angel Hernandez    GLUCOSE, POC    Collection Time: 11/21/22 10:31 PM   Result Value Ref Range    Glucose (POC) 161 (H) 70 - 110 mg/dL Performed by [employer]    CBC WITH AUTOMATED DIFF    Collection Time: 11/22/22  4:18 AM   Result Value Ref Range    WBC 5.0 4.6 - 13.2 K/uL    RBC 3.26 (L) 4.20 - 5.30 M/uL    HGB 10.5 (L) 12.0 - 16.0 g/dL    HCT 35.4 35.0 - 45.0 %    .6 (H) 78.0 - 100.0 FL    MCH 32.2 24.0 - 34.0 PG    MCHC 29.7 (L) 31.0 - 37.0 g/dL    RDW 15.8 (H) 11.6 - 14.5 %    PLATELET 059 284 - 395 K/uL    MPV 10.1 9.2 - 11.8 FL    NRBC 0.0 0.0  WBC    ABSOLUTE NRBC 0.00 0.00 - 0.01 K/uL    NEUTROPHILS 64 40 - 73 %    LYMPHOCYTES 20 (L) 21 - 52 %    MONOCYTES 14 (H) 3 - 10 %    EOSINOPHILS 1 0 - 5 %    BASOPHILS 1 0 - 2 %    IMMATURE GRANULOCYTES 0 0 - 0.5 %    ABS. NEUTROPHILS 3.2 1.8 - 8.0 K/UL    ABS. LYMPHOCYTES 1.0 0.9 - 3.6 K/UL    ABS. MONOCYTES 0.7 0.05 - 1.2 K/UL    ABS. EOSINOPHILS 0.1 0.0 - 0.4 K/UL    ABS. BASOPHILS 0.1 0.0 - 0.1 K/UL    ABS. IMM.  GRANS. 0.0 0.00 - 0.04 K/UL    DF AUTOMATED     MAGNESIUM    Collection Time: 11/22/22  4:18 AM   Result Value Ref Range    Magnesium 2.1 1.6 - 2.6 mg/dL   METABOLIC PANEL, BASIC    Collection Time: 11/22/22  4:18 AM   Result Value Ref Range    Sodium 135 (L) 136 - 145 mmol/L    Potassium 5.2 3.5 - 5.5 mmol/L    Chloride 103 100 - 111 mmol/L    CO2 23 21 - 32 mmol/L    Anion gap 9 3.0 - 18.0 mmol/L    Glucose 97 74 - 99 mg/dL    BUN 67 (H) 7 - 18 mg/dL    Creatinine 9.67 (H) 0.60 - 1.30 mg/dL    BUN/Creatinine ratio 7 (L) 12 - 20      eGFR 4 (L) >60 ml/min/1.73m2    Calcium 8.1 (L) 8.5 - 10.1 mg/dL   PHOSPHORUS    Collection Time: 11/22/22  4:18 AM   Result Value Ref Range    Phosphorus 6.1 (H) 2.5 - 4.9 mg/dL   TSH 3RD GENERATION    Collection Time: 11/22/22  4:18 AM   Result Value Ref Range    TSH 10.50 (H) 0.36 - 3.74 uIU/mL   TROPONIN-HIGH SENSITIVITY    Collection Time: 11/22/22  4:18 AM   Result Value Ref Range    Troponin-High Sensitivity 315 (HH) 0 - 54 ng/L   EKG, 12 LEAD, INITIAL    Collection Time: 11/22/22  4:46 AM   Result Value Ref Range    Ventricular Rate 85 BPM    Atrial Rate 85 BPM    P-R Interval 188 ms    QRS Duration 92 ms    Q-T Interval 410 ms    QTC Calculation (Bezet) 488 ms    Calculated P Axis 40 degrees    Calculated R Axis 73 degrees    Calculated T Axis 55 degrees    Diagnosis       Sinus rhythm  Borderline prolonged QT interval    Confirmed by JAVIER Mathew ([id number]) on 11/22/2022 1:24:52 PM     GLUCOSE, POC    Collection Time: 11/22/22  7:20 AM   Result Value Ref Range    Glucose (POC) 81 70 - 110 mg/dL    Performed by [facility]    ECHO ADULT COMPLETE    Collection Time: 11/22/22  8:22 AM   Result Value Ref Range    LV EDV A2C 47 mL    LV EDV A4C 53 mL    LV ESV A2C 19 mL    LV ESV A4C 21 mL    IVSd 1.5 (A) 0.6 - 0.9 cm    LVIDd 4.5 3.9 - 5.3 cm    LVIDs 3.1 cm    LVOT Diameter 2.4 cm    LVOT Mean Gradient 4 mmHg    LVOT VTI 27.6 cm    LVOT Peak Velocity 1.5 m/s    LVOT Peak Gradient 8 mmHg    LVPWd 1.3 (A) 0.6 - 0.9 cm    LV E' Lateral Velocity 8 cm/s    LV E' Septal Velocity 7 cm/s    LV Ejection Fraction A2C 59 %    LV Ejection Fraction A4C 61 %    EF BP 62 55 - 100 %    LVOT Area 4.5 cm2    LVOT .8 ml    LA Minor Axis 5.6 cm    LA Major Axis 6.0 cm    LA Area 2C 23.3 cm2    LA Area 4C 23.5 cm2    LA Volume BP 77 (A) 22 - 52 mL    LA Diameter 3.8 cm    RA Area 4C 19.1 cm2    RA Volume 56 ml    Est. RA Pressure 3 mmHg    AV Mean Gradient 5 mmHg    AV VTI 34.4 cm    AV Mean Velocity 1.0 m/s    AV Peak Velocity 1.5 m/s    AV Peak Gradient 9 mmHg    AV Area by VTI 3.6 cm2    AV Area by Peak Velocity 4.4 cm2    Aortic Root 3.5 cm    Ascending Aorta 3.2 cm    IVC Proxmal 1.8 cm    MV E Wave Deceleration Time 187.0 ms    MV A Velocity 0.64 m/s    MV E Velocity 0.69 m/s    MV Mean Gradient 2 mmHg    MV VTI 21.7 cm    MV Mean Velocity 0.6 m/s    MV Max Velocity 0.8 m/s    MV Peak Gradient 3 mmHg    MV Area by VTI 5.8 cm2    PV Max Velocity 1.1 m/s    PV Peak Gradient 5 mmHg    RV Basal Dimension 4.6 cm    RV Longitudinal Dimension 6.6 cm    RV Mid Dimension 3.6 cm    RV Free Wall Strain -18 %    TAPSE 2.1 1.7 cm    TR Max Velocity 2.69 m/s    TR Peak Gradient 29 mmHg    Fractional Shortening 2D 31 28 - 44 %    LV ESV Index A4C 13 mL/m2    LV EDV Index A4C 32 mL/m2    LV ESV Index A2C 11 mL/m2    LV EDV Index A2C 28 mL/m2    LVIDd Index 2.71 cm/m2    LVIDs Index 1.87 cm/m2    LV RWT Ratio 0.58     LV Mass 2D 248.4 (A) 67 - 162 g    LV Mass 2D Index 149.7 (A) 43 - 95 g/m2    MV E/A 1.08     E/E' Ratio (Averaged) 9.24     E/E' Lateral 8.63     E/E' Septal 9.86     LA Volume Index BP 46 (A) 16 - 34 ml/m2    LVOT Stroke Volume Index 75.2 mL/m2    LA Size Index 2.29 cm/m2    LA/AO Root Ratio 1.09     RA Volume Index A4C 34 mL/m2    Ao Root Index 2.11 cm/m2    Ascending Aorta Index 1.93 cm/m2    AV Velocity Ratio 1.00     LVOT:AV VTI Index 0.80     ARPITA/BSA VTI 2.2 cm2/m2    ARPITA/BSA Peak Velocity 2.7 cm2/m2    MV:LVOT VTI Index 0.79     RVSP 32 mmHg   GLUCOSE, POC    Collection Time: 11/22/22 11:17 AM   Result Value Ref Range    Glucose (POC) 96 70 - 110 mg/dL    Performed by [facility]            Assessment:     Active Problems:    Hyperkalemia (11/21/2022)      Elevated troponin (11/21/2022)      Volume overload (11/21/2022)      ESRD needing dialysis ([facility] [address] (11/21/2022)      Plan:   #1: Elevated trop, chest pain,   -suspect demand ischemia, in setting of ESRD, missed HD,   -EKG sinus rhythm-non-ischemic  -cont eliquis, BB,   -seen by cardiology, recommends outpatient NST by primary cardiologist.    #2: ESRD on HD, missed HD  - K level is 6 on admit s/p insulin, dextrose, calcium gluconate and kayexelate  -K now improved to 5.2  -cont renal supp, hx failed renal transplant on -HD cycle at Dr Sarah Pereyra.  -nephrology consult for hemodialysis     #3: HTN:  - cont home oral meds, add prn iv hydralazine     #4: Copd:  - resume all inhalers on dc, duonebs prn while here, currently no acute exacerbations. #5: PAF  -cont amio, eliquis, bb.      #6: Hypothyroidism  -tsh was 10.5 in August, now 10.5 again, increase levothyroxine to 125mcg PO daily--repeat tsh in 1 month at pcp's office. #7: Hx cmv:  - cont valcyte, renal transplant has failed, ask pt to see if she still needs this. VTE prophylaxis: Eliquis  Code status: full code  Total time: 25 minutes  Disposition: Home, after nephrology clearance.       Signed By: Dav Bajwa NP     November 22, 2022

## 2022-11-22 NOTE — PROGRESS NOTES
After remaining cp free since admission, pt has recurrant cp, awaiitng repeat trop, repeat ecg, sl nitro. Addendum- ecg w/o signs ischemia, latest trop trending down.

## 2022-11-22 NOTE — CONSULTS
CARDIOLOGY CONSULTATION      REASON FOR CONSULT: Elevated troponin    REQUESTING PROVIDER: Dr. Moser Nab:  Weakness    HISTORY OF PRESENT ILLNESS:  Mathieu Phillips is a 74y.o. year-old female with past medical history significant for ESRD on HD, COPD, hypertension, paroxysmal atrial fibrillation, hypothyroidism, hyperlipidemia who was evaluated today due to elevated troponin. She is a M/W/F hemodialysis patient. She missed her HD session yesterday due to feeling weak as well as social issues that limit her transportation. Upon arrival she was hyperkalemic. EKG with no ischemic changes. Troponin trended 486-->511-->315. She denies chest pain, dyspnea, or orthopnea. She is followed by [facility] cardiology and had a normal cath in 9/2020 showing only tortuous vessels. She received insulin, D50, calcium, and kayexalate and continues to feel weak despite K+ down to 5.2. Plans for HD today. Records from hospital admission course thus far reviewed. Telemetry reviewed. No events overnight. Remains in NSR . INPATIENT MEDICATIONS:  Home medications reviewed.     Current Facility-Administered Medications:     nitroglycerin (NITROSTAT) tablet 0.4 mg, 0.4 mg, SubLINGual, PRN, Lana King MD, 0.4 mg at 11/22/22 0444    amiodarone (CORDARONE) tablet 200 mg, 200 mg, Oral, DAILY, Lana King MD, 200 mg at 11/22/22 5099    atorvastatin (LIPITOR) tablet 10 mg, 10 mg, Oral, DAILY, Lana King MD, 10 mg at 11/22/22 6464    sevelamer carbonate (RENVELA) tab 800 mg, 800 mg, Oral, TID, Lana King MD, 800 mg at 11/22/22 6742    predniSONE (DELTASONE) tablet 5 mg, 5 mg, Oral, DAILY, Lana King MD, 5 mg at 11/22/22 0929    losartan (COZAAR) tablet 50 mg, 50 mg, Oral, DAILY, Lana King MD, 50 mg at 11/22/22 0929    montelukast (SINGULAIR) tablet 10 mg, 10 mg, Oral, DAILY, Lana King MD, 10 mg at 11/22/22 1157    levothyroxine (SYNTHROID) tablet 75 mcg, 75 mcg, Oral, ACB, Ortega Murphy MD, 75 mcg at 11/22/22 0636    famotidine (PEPCID) tablet 20 mg, 20 mg, Oral, DAILY, Ortega Murphy MD, 20 mg at 11/22/22 3752    cinacalcet (SENSIPAR) tablet 30 mg, 30 mg, Oral, DAILY, Ortega Murphy MD, 30 mg at 11/22/22 5458    carvediloL (COREG) tablet 25 mg, 25 mg, Oral, BID WITH MEALS, Ortega Murphy MD, 25 mg at 11/22/22 9298    apixaban (ELIQUIS) tablet 2.5 mg, 2.5 mg, Oral, BID, Ortega Murphy MD, 2.5 mg at 11/22/22 0929    amLODIPine (NORVASC) tablet 10 mg, 10 mg, Oral, DAILY, Ortega Murphy MD, 10 mg at 11/22/22 0689    hydrALAZINE (APRESOLINE) 20 mg/mL injection 20 mg, 20 mg, IntraVENous, Q6H PRN, Ortega Murphy MD    sodium chloride (NS) flush 5-40 mL, 5-40 mL, IntraVENous, Q8H, Ortega Murphy MD, 10 mL at 11/22/22 0636    sodium chloride (NS) flush 5-40 mL, 5-40 mL, IntraVENous, PRN, Ortega Murphy MD    acetaminophen (TYLENOL) tablet 650 mg, 650 mg, Oral, Q6H PRN **OR** acetaminophen (TYLENOL) suppository 650 mg, 650 mg, Rectal, Q6H PRN, Ortega Murphy MD    polyethylene glycol (MIRALAX) packet 17 g, 17 g, Oral, DAILY PRN, Ortega Murphy MD    ondansetron (ZOFRAN ODT) tablet 4 mg, 4 mg, Oral, Q8H PRN **OR** ondansetron (ZOFRAN) injection 4 mg, 4 mg, IntraVENous, Q6H PRN, Ortega Murphy MD    albuterol-ipratropium (DUO-NEB) 2.5 MG-0.5 MG/3 ML, 3 mL, Nebulization, Q6H PRN, Ortega Murphy MD    valGANciclovir (VALCYTE) tablet 900 mg, 900 mg, Oral, DAILY, Ortega Murphy MD     ALLERGIES:  Allergies reviewed with the patient,  Allergies   Allergen Reactions    Aspirin Rash and Unknown (comments)    Bactrim [Sulfamethoxazole-Trimethoprim] Rash and Unknown (comments)    Bromfenac Rash and Unknown (comments)    Cefepime Other (comments)     Neurological reaction    Ceftriaxone Rash    Copper Rash    Ibuprofen Rash and Unknown (comments)    Ketorolac Tromethamine Rash and Unknown (comments)    Relafen [Nabumetone] Rash and Unknown (comments)    Rifampin Rash and Unknown (comments) Vancomycin Rash and Unknown (comments)     Pt reports causes itching, takes benadryl prior to use. Pt denies anaphylaxis. Requires benadryl with each vancomycin dose    . FAMILY HISTORY:  Family history reviewed. SOCIAL HISTORY:  Notable for no tobacco use, no heavy alcohol or illicit drug use. REVIEW OF SYSTEMS:  Complete review of systems performed, pertinents noted above, all other systems are negative. PHYSICAL EXAMINATION:  Vital sign assessment reveal a blood pressure of  150/75  and pulse rate of 78. Cardiovascular exam has a heart with a regular rate and rhythm, normal S1 and S2. No murmur present. There are no rubs or gallops. Good peripheral pulses. No jugular venous distension. No carotid bruits are present. Respiratory exam reveals clear lung fields, no rales or rhonchi. Gastrointestinal exam has soft, nontender abdomen with normal bowel sounds. Lymphatic exam reveals no edema and no varicosities. No notable skin changes. Neurologic exam is nonfocal.    Visit Vitals  BP (!) 150/75 (BP 1 Location: Right lower arm, BP Patient Position: At rest)   Pulse 77   Temp 97.7 °F (36.5 °C)   Resp 17   Ht 5' 1\" (1.549 m)   Wt 67.1 kg (148 lb)   SpO2 100%   BMI 27.96 kg/m²         Recent labs results and imaging reviewed. Discussed case with Dr. Magno Draper and our impression and recommendations are as follows:  Elevated troponin:  Likely demand in the setting of ESRD with missed HD. EKG non-ischemic. Cath 9/2020 with no CAD, only tortuous vessels. She has an allergy to aspirin. Continue statin and Eliqius for PAF. Could consider repeat NST as OP with primary cardiologist if indicated. Echo [facility] 2/2022 with preserved EF, normal wall motion. Repeat echo pending. Continue telemetry monitoring. Hypertension:  Elevated this morning with plans for HD this morning. Weakness:  Likely due to electrolyte imbalances and need for HD.   Will continue to monitor on telemetry. Thank you for involving us in the care of this patient. Please do not hesitate to call me or Dr. Yola Davalos if additional questions arise.     Radha Cullen NP  11/22/2022

## 2022-11-22 NOTE — PROGRESS NOTES
Patient repositioned. Warm blankets applied. Patient denies pain. Lights dimmed and call light in reach.

## 2022-11-22 NOTE — H&P
Hospitalist Admission Note    NAME: Isa Ennis   :  1955   MRN:  [id number]     Date/Time:  2022 9:52 PM    Patient PCP: Rober Honeycutt MD  ______________________________________________________________________  Given the patient's current clinical presentation, I have a high level of concern for decompensation if discharged from the emergency department. Complex decision making was performed, which includes reviewing the patient's available past medical records, laboratory results, and x-ray films. My assessment of this patient's clinical condition and my plan of care is as follows. Assessment / Plan:  Elevated trop, chest pain,   - admit, tele,   - cardiology consult  - echo  - allx to asa  - on noac, will not start hep drip  - cont bb  - trend trop  - consider cardiac cath vs stress test, defer to cardiology    ESRD on HD, missed today, hyperkalemia, no ecg changes noted  - insulin, dextrose, calcium given in ed  - I have added kayexolate  - recheck in am  - nephrology consult for hd in am- Dr Nuvia Law, entered in [employer], ensure nursing calls him in am  - cont renal supplements    HTN  - cont home oral meds, add prn iv hydralazine    Copd  - resume all inhalers on dc, duonebs prn while here, currently no resp symptoms    PAF  - cont amio, eliquis, bb    Hypothyroidism  - cont synthroid 75mcg, tsh was 10.5 in august, repeat to ensure not worsening    Hx cmv  - cont valcyte, renal transplant has failed, ask pt to see if she still needs this      Subjective:   CHIEF COMPLAINT: generalized malaise    HISTORY OF PRESENT ILLNESS:     Desiree Bazzi is a 74 y.o.  female who presents with weakness. Pt feeling week and fatigues yesterday, she normally gets HD on  and Friday, she couldn't  herself to HD today so came to ed. She admits headaches, nausea, chest pain. She still makes urine, burns with urination, but ua wnl. No appetite.  There were many issues with her being able to get to HD last admission. Many socoail issues, and there is some suspicion she is using ER as her HD. Her K is elevated, without ecg changes. Will admit for plans for HD in am.  Her trop is elevated, and even though this can be seen with esrd, she had not been elevated in the past. No current cp. The pain she had earlier was L sided dull chest pain. We were asked to admit for work up and evaluation of the above problems.      Past Medical History:   Diagnosis Date    JEFF (acute kidney injury) ([facility] [address] 05/09/2019    Anemia NEC     Anxiety     Arrhythmia     Arthritis     Asthma     Asthma     Burning with urination     frequent uti    Calculus of gallbladder with acute cholecystitis without obstruction 10/09/2020    Cholecystitis 01/12/2019    Chronic kidney disease     dialysis    CMV (cytomegalovirus) antibody positive     Colitis 09/10/2022    COPD (chronic obstructive pulmonary disease) ([facility] [address]     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    Dialysis patient [facility])     M/W/F    Diverticular disease of colon 08/21/2013    Dyspepsia and other specified disorders of function of stomach     stomach ulcer    Elevated troponin 10/21/2019    Encounter for cholecystectomy 05/06/2021 7/2020    Essential hypertension     GERD (gastroesophageal reflux disease)     History of chemotherapy     History of renal transplant 08/21/2013    (LD 2/12/2002)    HLD (hyperlipidemia)     Hypercholesteremia     Hypertension     Hypothyroidism 03/23/2022    Kidney transplant rejection     Menopause     Migraine     Obesity     Osteoporosis     Pancreatitis 08/28/2022    Pyelonephritis 07/13/2020    Recurrent urinary tract infection 09/27/2016    Renal cyst 2002    kidney transplant     Spontaneous bacterial peritonitis ([facility] [address] 01/16/2019    Ulcer         Past Surgical History:   Procedure Laterality Date    COLONOSCOPY      Dr Leti Lua  5yrs ago    ENDOSCOPY VISIT-OUTPATIENT      Dr Leti Lua  5 yrs ago    ENDOSCOPY, COLON, DIAGNOSTIC      with Dr. Ileana Galaviz CHOLECYSTECTOMY  02/2021    HX COLONOSCOPY  05/13/2022    HX GI      ulcer    HX HEENT      sinus surg    HX OTHER SURGICAL  02/21/2022    SIGMOIDOSCOPY, FLEXIBLE;  Surgeon: Faviola Leavitt MD    HX RENAL TRANSPLANT      HX TRANSPLANT      kidney transplant 2002    VASCULAR SURGERY PROCEDURE UNLIST      shunt in prep for dialysis       Social History     Tobacco Use    Smoking status: Never    Smokeless tobacco: Never   Substance Use Topics    Alcohol use: No        Family History   Problem Relation Age of Onset    Diabetes Mother     Cervical Cancer Mother     Arthritis-rheumatoid Mother     Hypertension Father     Diabetes Father     Thyroid Disease Sister     Colon Polyps Brother      Allergies   Allergen Reactions    Aspirin Rash and Unknown (comments)    Bactrim [Sulfamethoxazole-Trimethoprim] Rash and Unknown (comments)    Bromfenac Rash and Unknown (comments)    Cefepime Other (comments)     Neurological reaction    Ceftriaxone Rash    Copper Rash    Ibuprofen Rash and Unknown (comments)    Ketorolac Tromethamine Rash and Unknown (comments)    Relafen [Nabumetone] Rash and Unknown (comments)    Rifampin Rash and Unknown (comments)    Vancomycin Rash and Unknown (comments)     Pt reports causes itching, takes benadryl prior to use. Pt denies anaphylaxis. Requires benadryl with each vancomycin dose        Prior to Admission medications    Medication Sig Start Date End Date Taking? Authorizing Provider   Amirah Ellipta 100-62.5-25 mcg inhaler INHALE 1 PUFF EVERY DAY. PLEASE CALL AND SCHEDULE AN APPOINTMENT WITH NEW PCP FOR FUTURE REFILLS 11/19/22   Gerald Chanel MD   meclizine (ANTIVERT) 25 mg tablet TAKE 1 TABLET THREE TIMES DAILY AS NEEDED FOR DIZZINESS 11/14/22   Gerald Chanel MD   famotidine (PEPCID) 20 mg tablet Take 20 mg by mouth daily. 10/13/22   Provider, Stephanie   traZODone (DESYREL) 50 mg tablet Take 1 Tablet by mouth nightly.  Indications: insomnia associated with depression 11/3/22   Gabino Branham MD   albuterol (PROVENTIL HFA, VENTOLIN HFA, PROAIR HFA) 90 mcg/actuation inhaler Take 1 Puff by inhalation every four (4) hours as needed for Wheezing. Indications: asthma attack 11/3/22   Gabino Branham MD   fluticasone propionate [facility]) 50 mcg/actuation nasal spray USE 1 SPRAY IN [facility] NOSTRIL EVERY MORNING 10/5/22   Bakari DANG MD   EPINEPHrine (EPIPEN) 0.3 mg/0.3 mL injection INJECT 0.3 ML INTRAMUSCULARLY ONCE AS NEEDED FOR ALLERGIC RESPONSE 10/5/22   Bakari DANG MD   Dexilant 60 mg CpDB capsule (delayed release) TAKE 1 CAPSULE BY MOUTH IN THE MORNING. 10/3/22   Keyur Hanks MD   diclofenac (VOLTAREN) 1 % gel Apply  to affected area four (4) times daily. 9/23/22   Gabino Branham MD   ondansetron (ZOFRAN ODT) 4 mg disintegrating tablet Take 2 Tablets by mouth every eight (8) hours as needed for Nausea or Nausea or Vomiting. 9/14/22   Kristin Arellano MD   polyethylene glycol (Miralax) 17 gram packet Take 1 Packet by mouth two (2) times a day. 9/14/22   Kristin Arellano MD   doxercalciferoL (HECTOROL) 4 mcg/2 mL injection 6 mcg by IntraVENous route. 3/25/22   Provider, Historical   epoetin jalen (EPOGEN;PROCRIT) 10,000 unit/mL injection 10,000 Units by SubCUTAneous route. 3/28/22   Provider, Historical   cinacalcet (SENSIPAR) 30 mg tablet Take 30 mg by mouth daily. Provider, Historical   losartan (COZAAR) 50 mg tablet Take 1 Tablet by mouth daily. 8/26/22   Keyur Hanks MD   amiodarone (CORDARONE) 200 mg tablet TAKE 1 TABLET BY MOUTH EVERY DAY 8/16/22   Keyur Hanks MD   amLODIPine [facility]) 10 mg tablet Take 1 tablet by mouth once daily 8/9/22   Keyur Hanks MD   carvediloL (COREG) 25 mg tablet Take 1 Tablet by mouth two (2) times daily (with meals). 7/27/22   Keyur Hanks MD   levothyroxine (SYNTHROID) 75 mcg tablet Take 1 Tablet by mouth Daily (before breakfast).  7/27/22   Keyur Hanks MD   simvastatin (ZOCOR) 20 mg tablet TAKE 1 TABLET BY MOUTH DAILY AS DIRECTED. 7/27/22   Eliezer Townsend MD   valGANciclovir (VALCYTE) 450 mg tablet Take 2 Tablets by mouth in the morning. 7/27/22   Eliezer Townsend MD   dicyclomine (BENTYL) 10 mg capsule Take 1 Capsule by mouth in the morning. 7/27/22   Eliezer Townsend MD   montelukast (SINGULAIR) 10 mg tablet Take 1 Tablet by mouth in the morning. 7/27/22   Eliezer Townsend MD   hyoscyamine SL (LEVSIN/SL) 0.125 mg SL tablet 1 Tablet by SubLINGual route every four (4) hours as needed (irritable bowel). 7/27/22   Eliezer Townsend MD   sucralfate (CARAFATE) 1 gram tablet TAKE 1 TABLET BY MOUTH FOUR TIMES DAILY 7/27/22   Eliezer Townsend MD   apixaban [facility]) 2.5 mg tablet Take 1 Tablet by mouth two (2) times a day. 7/27/22   Eliezer Townsend MD   predniSONE (DELTASONE) 5 mg tablet Take 1 Tablet by mouth daily. Provider, Historical   albuterol (PROVENTIL VENTOLIN) 2.5 mg /3 mL (0.083 %) nebu USE 1 VIAL VIA NEBULIZER THREE TIMES DAILY 12/23/21   Eliezer Townsend MD   calcitRIOL (ROCALTROL) 0.5 mcg capsule Take 0.5 mcg by mouth See Admin Instructions. Take on non dialysis days (Tues, Wed, Thur, Sat, Sun)  Dialysis is on Monday and Friday 7/19/21   Abelardo Yepez MD   RenaPlex-D 800 mcg-12.5 mg -2,000 unit tab Take 1 Tablet by mouth daily. 4/15/21   Abelardo Yepez MD   sevelamer carbonate (RENVELA) 800 mg tab tab Take 800 mg by mouth three (3) times daily. 6/8/21   Abelardo Yepez MD   aluminum & magnesium hydroxide-simethicone (Maalox Maximum Strength) 400-400-40 mg/5 mL suspension Take 10 mL by mouth every six (6) hours as needed for Indigestion. 9/17/20   Rosalva Tee MD   ESTRACE 0.01 % (0.1 mg/gram) vaginal cream INSERT 2 GRAMS INTO VAGINA EVERY MONDAY AND THURSDAY 4/11/17   Veronica Griffin MD   cranberry extract 425 mg cap Take 425 mg by mouth daily.  1/17/14   Provider, Historical       REVIEW OF SYSTEMS:           Total of 12 systems reviewed as follows:       POSITIVE= bold text Negative = text not underlined  General:  fever, chills, sweats, generalized weakness, weight loss/gain,      loss of appetite   Eyes:    blurred vision, eye pain, loss of vision, double vision  ENT:    rhinorrhea, pharyngitis   Respiratory:   cough, sputum production, SOB, VILLA, wheezing, pleuritic pain   Cardiology:   chest pain, palpitations, orthopnea, PND, edema, syncope   Gastrointestinal:  abdominal pain , N/V, diarrhea, dysphagia, constipation, bleeding   Genitourinary:  frequency, urgency, dysuria, hematuria, incontinence   Muskuloskeletal :  arthralgia, myalgia, back pain  Hematology:  easy bruising, nose or gum bleeding, lymphadenopathy   Dermatological: rash, ulceration, pruritis, color change / jaundice  Endocrine:   hot flashes or polydipsia   Neurological:  headache, dizziness, confusion, focal weakness, paresthesia,     Speech difficulties, memory loss, gait difficulty  Psychological: Feelings of anxiety, depression, agitation    Objective:   VITALS:    Visit Vitals  BP (!) 170/84   Pulse 80   Temp 98.9 °F (37.2 °C)   Resp 18   Ht 5' 1\" (1.549 m)   Wt 65.8 kg (145 lb)   SpO2 98%   BMI 27.40 kg/m²       PHYSICAL EXAM:    General:    Alert, cooperative, no distress, appears stated age. HEENT: Atraumatic, anicteric sclerae, pink conjunctivae     No oral ulcers, mucosa moist, throat clear, dentition fair  Neck:  Supple, symmetrical,  thyroid: non tender  Lungs:   Clear to auscultation bilaterally. No Wheezing or Rhonchi. No rales. Chest wall:  No tenderness  No Accessory muscle use. Heart:   Regular  rhythm,  No  murmur   No edema  Abdomen:   Soft, non-tender. Not distended. Bowel sounds normal  Extremities: No cyanosis. No clubbing,      Skin turgor normal, Capillary refill normal, Radial dial pulse 2+  Skin:     Not pale. Not Jaundiced  No rashes   Psych:  Good insight. Not depressed. Not anxious or agitated. Neurologic: EOMs intact. No facial asymmetry. No aphasia or slurred speech. Symmetrical strength, Sensation grossly intact. Alert and oriented X 4.     _______________________________________________________________________  Care Plan discussed with:    Comments   Patient x    Family      RN     Care Manager                    Consultant:      _______________________________________________________________________  Expected  Disposition:   Home with Family x   HH/PT/OT/RN    SNF/LTC    [facility]    ________________________________________________________________________  TOTAL TIME:  39 Minutes    Critical Care Provided     Minutes non procedure based      Comments    x Reviewed previous records   >50% of visit spent in counseling and coordination of care x Discussion with patient and/or family and questions answered       ________________________________________________________________________  Signed: Alexy Harper MD    Procedures: see electronic medical records for all procedures/Xrays and details which were not copied into this note but were reviewed prior to creation of Plan.     LAB DATA REVIEWED:    Recent Results (from the past 24 hour(s))   EKG, 12 LEAD, INITIAL    Collection Time: 11/21/22  1:51 PM   Result Value Ref Range    Ventricular Rate 85 BPM    Atrial Rate 84 BPM    P-R Interval 193 ms    QRS Duration 93 ms    Q-T Interval 418 ms    QTC Calculation (Bezet) 497 ms    Calculated P Axis 70 degrees    Calculated R Axis 74 degrees    Calculated T Axis 44 degrees    Diagnosis       Sinus rhythm  Borderline prolonged QT interval    Confirmed by JAVIER Bazzi ([id number]) on 11/21/2022 5:28:16 PM     CBC WITH AUTOMATED DIFF    Collection Time: 11/21/22  3:55 PM   Result Value Ref Range    WBC 7.0 4.6 - 13.2 K/uL    RBC 3.42 (L) 4.20 - 5.30 M/uL    HGB 11.0 (L) 12.0 - 16.0 g/dL    HCT 35.5 35.0 - 45.0 %    .8 (H) 78.0 - 100.0 FL    MCH 32.2 24.0 - 34.0 PG    MCHC 31.0 31.0 - 37.0 g/dL    RDW 15.8 (H) 11.6 - 14.5 %    PLATELET 926 555 - 426 K/uL    MPV 10.3 9.2 - 11.8 FL    NRBC 0.0 0. 0  WBC    ABSOLUTE NRBC 0.00 0.00 - 0.01 K/uL    NEUTROPHILS 67 40 - 73 %    LYMPHOCYTES 20 (L) 21 - 52 %    MONOCYTES 11 (H) 3 - 10 %    EOSINOPHILS 1 0 - 5 %    BASOPHILS 1 0 - 2 %    IMMATURE GRANULOCYTES 0 0 - 0.5 %    ABS. NEUTROPHILS 4.7 1.8 - 8.0 K/UL    ABS. LYMPHOCYTES 1.4 0.9 - 3.6 K/UL    ABS. MONOCYTES 0.8 0.05 - 1.2 K/UL    ABS. EOSINOPHILS 0.1 0.0 - 0.4 K/UL    ABS. BASOPHILS 0.1 0.0 - 0.1 K/UL    ABS. IMM. GRANS. 0.0 0.00 - 0.04 K/UL    DF AUTOMATED     METABOLIC PANEL, COMPREHENSIVE    Collection Time: 11/21/22  3:55 PM   Result Value Ref Range    Sodium 136 136 - 145 mmol/L    Potassium 6.0 (H) 3.5 - 5.5 mmol/L    Chloride 106 100 - 111 mmol/L    CO2 21 21 - 32 mmol/L    Anion gap 9 3.0 - 18.0 mmol/L    Glucose 73 (L) 74 - 99 mg/dL    BUN 68 (H) 7 - 18 mg/dL    Creatinine 9.76 (H) 0.60 - 1.30 mg/dL    BUN/Creatinine ratio 7 (L) 12 - 20      eGFR 4 (L) >60 ml/min/1.73m2    Calcium 7.8 (L) 8.5 - 10.1 mg/dL    Bilirubin, total 0.8 0.2 - 1.0 mg/dL    AST (SGOT) 17 10 - 38 U/L    ALT (SGPT) 21 13 - 56 U/L    Alk.  phosphatase 148 (H) 45 - 117 U/L    Protein, total 7.3 6.4 - 8.2 g/dL    Albumin 3.4 3.4 - 5.0 g/dL    Globulin 3.9 2.0 - 4.0 g/dL    A-G Ratio 0.9 0.8 - 1.7     MAGNESIUM    Collection Time: 11/21/22  3:55 PM   Result Value Ref Range    Magnesium 2.1 1.6 - 2.6 mg/dL   LIPASE    Collection Time: 11/21/22  3:55 PM   Result Value Ref Range    Lipase 221 73 - 393 U/L   TROPONIN-HIGH SENSITIVITY    Collection Time: 11/21/22  3:55 PM   Result Value Ref Range    Troponin-High Sensitivity 486 (HH) 0 - 54 ng/L   URINALYSIS W/ RFLX MICROSCOPIC    Collection Time: 11/21/22  5:21 PM   Result Value Ref Range    Color Yellow      Appearance Clear      Specific gravity 1.011 1.005 - 1.030      pH (UA) 8.0 5.0 - 8.0      Protein 300 (A) Negative mg/dL    Glucose Negative Negative mg/dL    Ketone Negative Negative mg/dL    Bilirubin Negative Negative      Blood Trace (A) Negative      Urobilinogen 0.2 0.2 - 1.0 EU/dL    Nitrites Negative Negative      Leukocyte Esterase Negative Negative     URINE MICROSCOPIC    Collection Time: 11/21/22  5:21 PM   Result Value Ref Range    WBC 0-4 0 - 4 /hpf    RBC 0-5 0 - 2 /hpf    Epithelial cells Few 0 - 20 /lpf    Bacteria 1+ (A) None /hpf   GLUCOSE, POC    Collection Time: 11/21/22  7:41 PM   Result Value Ref Range    Glucose (POC) 77 70 - 110 mg/dL    Performed by Loree Amanda    TROPONIN-HIGH SENSITIVITY    Collection Time: 11/21/22  8:58 PM   Result Value Ref Range    Troponin-High Sensitivity 511 (HH) 0 - 54 ng/L   GLUCOSE, POC    Collection Time: 11/21/22  9:47 PM   Result Value Ref Range    Glucose (POC) 40 (LL) 70 - 110 mg/dL    Performed by Carolina Livingston

## 2022-11-22 NOTE — PROGRESS NOTES
Chart reviewed and orders placed for dialysis mwf  Admitted with weakness and malaise   Workup as per attending

## 2022-11-22 NOTE — PROGRESS NOTES
Scheduled medication given, pt tolerated well. 18G to LEJ flushed without issue. Brief clean and dry. Pt states she is CP free at this time.  CBWR

## 2022-11-22 NOTE — PROGRESS NOTES
Problem: Falls - Risk of  Goal: *Absence of Falls  Description: Document Adycori Asencioer Fall Risk and appropriate interventions in the flowsheet.   Outcome: Progressing Towards Goal  Note: Fall Risk Interventions:  Mobility Interventions: Bed/chair exit alarm, PT Consult for mobility concerns         Medication Interventions: Bed/chair exit alarm, Patient to call before getting OOB, Teach patient to arise slowly    Elimination Interventions: Call light in reach, Toileting schedule/hourly rounds, Patient to call for help with toileting needs

## 2022-11-22 NOTE — PROGRESS NOTES
Admission Medication Reconciliation:    Information obtained from:  Patient herself    Comments/Recommendations: Reviewed PTA medications and patient's allergies. Patient states she was just at the doctor on 11/3/2022 and that her medication list was updated on that visit. I looked at the medication list at the end of that visit and compared to what was on her PTA medication list.  There were no changes noted.          Allergies:  Aspirin, Bactrim [sulfamethoxazole-trimethoprim], Bromfenac, Cefepime, Ceftriaxone, Copper, Ibuprofen, Ketorolac tromethamine, Relafen [nabumetone], Rifampin, and Vancomycin    Significant PMH/Disease States:   Past Medical History:   Diagnosis Date    JEFF (acute kidney injury) ([facility] [address] 05/09/2019    Anemia NEC     Anxiety     Arrhythmia     Arthritis     Asthma     Asthma     Burning with urination     frequent uti    Calculus of gallbladder with acute cholecystitis without obstruction 10/09/2020    Cholecystitis 01/12/2019    Chronic kidney disease     dialysis    CMV (cytomegalovirus) antibody positive     Colitis 09/10/2022    COPD (chronic obstructive pulmonary disease) ([facility] [address]     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    Dialysis patient [facility])     M/W/F    Diverticular disease of colon 08/21/2013    Dyspepsia and other specified disorders of function of stomach     stomach ulcer    Elevated troponin 10/21/2019    Encounter for cholecystectomy 05/06/2021 7/2020    Essential hypertension     GERD (gastroesophageal reflux disease)     History of chemotherapy     History of renal transplant 08/21/2013    (LD 2/12/2002)    HLD (hyperlipidemia)     Hypercholesteremia     Hypertension     Hypothyroidism 03/23/2022    Kidney transplant rejection     Menopause     Migraine     Obesity     Osteoporosis     Pancreatitis 08/28/2022    Pyelonephritis 07/13/2020    Recurrent urinary tract infection 09/27/2016    Renal cyst 2002    kidney transplant     Spontaneous bacterial peritonitis ([facility] [address] 01/16/2019    Ulcer      Chief Complaint for this Admission:    Chief Complaint   Patient presents with    Fatigue     Prior to Admission Medications:   Prior to Admission Medications   Prescriptions Last Dose Informant Patient Reported? Taking? Dexilant 60 mg CpDB capsule (delayed release)   No No   Sig: TAKE 1 CAPSULE BY MOUTH IN THE MORNING. EPINEPHrine (EPIPEN) 0.3 mg/0.3 mL injection   No No   Sig: INJECT 0.3 ML INTRAMUSCULARLY ONCE AS NEEDED FOR ALLERGIC RESPONSE   ESTRACE 0.01 % (0.1 mg/gram) vaginal cream  Self No No   Sig: INSERT 2 GRAMS INTO VAGINA EVERY MONDAY AND THURSDAY   RenaPlex-D 800 mcg-12.5 mg -2,000 unit tab  Self Yes No   Sig: Take 1 Tablet by mouth daily. Trelegy Ellipta 100-62.5-25 mcg inhaler   No No   Sig: INHALE 1 PUFF EVERY DAY. PLEASE CALL AND SCHEDULE AN APPOINTMENT WITH NEW PCP FOR FUTURE REFILLS   albuterol (PROVENTIL HFA, VENTOLIN HFA, PROAIR HFA) 90 mcg/actuation inhaler   No No   Sig: Take 1 Puff by inhalation every four (4) hours as needed for Wheezing. Indications: asthma attack   albuterol (PROVENTIL VENTOLIN) 2.5 mg /3 mL (0.083 %) nebu  Self No No   Sig: USE 1 VIAL VIA NEBULIZER THREE TIMES DAILY   aluminum & magnesium hydroxide-simethicone (Maalox Maximum Strength) 400-400-40 mg/5 mL suspension  Self No No   Sig: Take 10 mL by mouth every six (6) hours as needed for Indigestion. amLODIPine (NORVASC) 10 mg tablet  Self No No   Sig: Take 1 tablet by mouth once daily   amiodarone (CORDARONE) 200 mg tablet  Self No No   Sig: TAKE 1 TABLET BY MOUTH EVERY DAY   apixaban (ELIQUIS) 2.5 mg tablet  Self No No   Sig: Take 1 Tablet by mouth two (2) times a day. calcitRIOL (ROCALTROL) 0.5 mcg capsule  Self Yes No   Sig: Take 0.5 mcg by mouth See Admin Instructions. Take on non dialysis days (Tues, Wed, Thur, Sat, Sun)  Dialysis is on Monday and Friday   carvediloL (COREG) 25 mg tablet  Self No No   Sig: Take 1 Tablet by mouth two (2) times daily (with meals).    cinacalcet (SENSIPAR) 30 mg tablet  Self Yes No   Sig: Take 30 mg by mouth daily. cranberry extract 425 mg cap  Self Yes No   Sig: Take 425 mg by mouth daily. diclofenac (VOLTAREN) 1 % gel   No No   Sig: Apply  to affected area four (4) times daily. dicyclomine (BENTYL) 10 mg capsule  Self No No   Sig: Take 1 Capsule by mouth in the morning. doxercalciferoL (HECTOROL) 4 mcg/2 mL injection   Yes No   Si mcg by IntraVENous route. epoetin jalen (EPOGEN;PROCRIT) 10,000 unit/mL injection   Yes No   Sig: 10,000 Units by SubCUTAneous route. famotidine (PEPCID) 20 mg tablet   Yes No   Sig: Take 20 mg by mouth daily. fluticasone propionate (FLONASE) 50 mcg/actuation nasal spray   No No   Sig: USE 1 SPRAY IN EACH NOSTRIL EVERY MORNING   hyoscyamine SL (LEVSIN/SL) 0.125 mg SL tablet  Self No No   Si Tablet by SubLINGual route every four (4) hours as needed (irritable bowel). levothyroxine (SYNTHROID) 75 mcg tablet  Self No No   Sig: Take 1 Tablet by mouth Daily (before breakfast). losartan (COZAAR) 50 mg tablet  Self No No   Sig: Take 1 Tablet by mouth daily. meclizine (ANTIVERT) 25 mg tablet   No No   Sig: TAKE 1 TABLET THREE TIMES DAILY AS NEEDED FOR DIZZINESS   montelukast (SINGULAIR) 10 mg tablet  Self No No   Sig: Take 1 Tablet by mouth in the morning. ondansetron (ZOFRAN ODT) 4 mg disintegrating tablet   No No   Sig: Take 2 Tablets by mouth every eight (8) hours as needed for Nausea or Nausea or Vomiting. polyethylene glycol (Miralax) 17 gram packet   No No   Sig: Take 1 Packet by mouth two (2) times a day. predniSONE (DELTASONE) 5 mg tablet  Self Yes No   Sig: Take 1 Tablet by mouth daily. sevelamer carbonate (RENVELA) 800 mg tab tab  Self Yes No   Sig: Take 800 mg by mouth three (3) times daily.    simvastatin (ZOCOR) 20 mg tablet  Self No No   Sig: TAKE 1 TABLET BY MOUTH DAILY AS DIRECTED.   sucralfate (CARAFATE) 1 gram tablet  Self No No   Sig: TAKE 1 TABLET BY MOUTH FOUR TIMES DAILY traZODone (DESYREL) 50 mg tablet   No No   Sig: Take 1 Tablet by mouth nightly. Indications: insomnia associated with depression   valGANciclovir (VALCYTE) 450 mg tablet  Self No No   Sig: Take 2 Tablets by mouth in the morning.       Facility-Administered Medications: None       KAREN Demarco

## 2022-11-22 NOTE — PROGRESS NOTES
Care Management Interventions  PCP Verified by CM: Yes  Palliative Care Criteria Met (RRAT>21 & CHF Dx)?: No  Mode of Transport at Discharge: Other (see comment) (Family)  Transition of Care Consult (CM Consult): Discharge Planning  MyChart Signup: No  Discharge Durable Medical Equipment: No  Health Maintenance Reviewed: Yes  Physical Therapy Consult: No  Occupational Therapy Consult: No  Speech Therapy Consult: No  Support Systems: Child(lilibeth), Other Family Member(s)  Confirm Follow Up Transport: Family  The Patient and/or Patient Representative was Provided with a Choice of Provider and Agrees with the Discharge Plan?: Yes  Freedom of Choice List was Provided with Basic Dialogue that Supports the Patient's Individualized Plan of Care/Goals, Treatment Preferences and Shares the Quality Data Associated with the Providers?: Yes  Discharge Location  Patient Expects to be Discharged to[de-identified] Home  Reason for Admission: Chart reviewed and noted patient presents with C/O weakness. Pt was feeling weak and fatigued yesterday. She normally gets HD on Monday and Friday, and was unable to drive herself there. Also, she has headaches, nausea and chest pain. She still makes urine, burns with urination, but UA WNL. Pt states she doesn't have an appetite. Her K is elevated, without EKG changes. Her trop is elevated, and even though this can be seen with ESRD, she had not been elevated in the past.     Dx: Elevated Trop, CP, ESRD     PMH: JEFF, Arrhythmia, CKD, COPD, HTN, GERD, Renal Transplant, HLD, Hypercholesteremia, Recurrent UTI                        RUR Score: 30%                 PCP: First and Last name:   Brian Oscar MD     Name of Practice:    Are you a current patient: Yes/No:    Approximate date of last visit:    Can you participate in a virtual visit if needed:     Do you (patient/family) have any concerns for transition/discharge?  No                  Plan for utilizing home health: TBD       Current Advanced Directive/Advance Care Plan:  Full Code      Healthcare Decision Maker: Chas Ashley- [phone]   Click here to complete [address] including selection of the Healthcare Decision Maker Relationship (ie \"Primary\")            Primary Decision Maker: Og Zamorano - Daughter - [phone]    Transition of Care Plan: Patient lives at home with her sister and doesn't use any DME. Pt doesn't want Palliative Care. Pt may benefit for having HH to ensure on-going monitoring after discharge. Patient will be returning back home with her sister at discharge.

## 2022-11-22 NOTE — PROGRESS NOTES
Complete bed bath and linen change performed for large, loose stool. Kendra care provided. New PureWick in place. 0445 - C/o 9/10 chest pain. SL Nitro administered. 7/10 chest pain 5 minutes after SL nitro. EKG performed. 9890 - Provider notified of critical Troponin trending down. No new orders.

## 2022-11-22 NOTE — PROGRESS NOTES
Cathleen Hairston from [facility] 9369 notified that patient has orders for HD tomorrow 11/23

## 2022-11-22 NOTE — PROGRESS NOTES
0700- Assumed care of patient from off going nurse. 0715- Cardio at bedside for ECHO.       5992- Morning medications administered patient tolerated well.

## 2022-11-22 NOTE — PROGRESS NOTES
2340 - Pt admitted to room 241 via stretcher and was transferred to bed via sliding technique x4 staff members. Pt is a&ox3. Denies any pain. VSS. Skin clean, dry and intact. Primary nurse at bedside for assessment. Admission questions completed by this nurse. Pure wick in place as pt is stating she is \"too weak to stand\" and states she still makes urine (dialysis pt. ) Dialysis fistula present in left arm. 18G IV in LEJ flushed without issue, locked and capped. NOW/STAT dose of kayexalate given, pt tolerated well. BG @2231 161. Oriented to room, call light and bed controls. Pt denies any pain, discomfort or needs at this time.  CBWR    Unable to complete med rec at this time as pt does not remember what medication she takes and did not bring list.

## 2022-11-23 VITALS
WEIGHT: 148 LBS | HEIGHT: 61 IN | RESPIRATION RATE: 16 BRPM | BODY MASS INDEX: 27.94 KG/M2 | TEMPERATURE: 97 F | DIASTOLIC BLOOD PRESSURE: 94 MMHG | SYSTOLIC BLOOD PRESSURE: 123 MMHG | OXYGEN SATURATION: 100 % | HEART RATE: 74 BPM

## 2022-11-23 LAB
ANION GAP SERPL CALC-SCNC: 13 MMOL/L (ref 3–18)
ATRIAL RATE: 68 BPM
BASOPHILS # BLD: 0 K/UL (ref 0–0.1)
BASOPHILS NFR BLD: 1 % (ref 0–2)
BUN SERPL-MCNC: 66 MG/DL (ref 7–18)
BUN/CREAT SERPL: 6 (ref 12–20)
CA-I BLD-MCNC: 7.4 MG/DL (ref 8.5–10.1)
CALCULATED P AXIS, ECG09: 56 DEGREES
CALCULATED R AXIS, ECG10: 68 DEGREES
CALCULATED T AXIS, ECG11: 58 DEGREES
CHLORIDE SERPL-SCNC: 104 MMOL/L (ref 100–111)
CO2 SERPL-SCNC: 21 MMOL/L (ref 21–32)
CREAT SERPL-MCNC: 10.4 MG/DL (ref 0.6–1.3)
DIAGNOSIS, 93000: NORMAL
DIFFERENTIAL METHOD BLD: ABNORMAL
EOSINOPHIL # BLD: 0.1 K/UL (ref 0–0.4)
EOSINOPHIL NFR BLD: 2 % (ref 0–5)
ERYTHROCYTE [DISTWIDTH] IN BLOOD BY AUTOMATED COUNT: 15.2 % (ref 11.6–14.5)
GLUCOSE BLD STRIP.AUTO-MCNC: 93 MG/DL (ref 70–110)
GLUCOSE SERPL-MCNC: 89 MG/DL (ref 74–99)
HBV SURFACE AG SER QL: <0.1 INDEX
HBV SURFACE AG SER QL: NEGATIVE
HCT VFR BLD AUTO: 49.5 % (ref 35–45)
HGB BLD-MCNC: 15.8 G/DL (ref 12–16)
IMM GRANULOCYTES # BLD AUTO: 0 K/UL (ref 0–0.04)
IMM GRANULOCYTES NFR BLD AUTO: 0 % (ref 0–0.5)
LYMPHOCYTES # BLD: 0.8 K/UL (ref 0.9–3.6)
LYMPHOCYTES NFR BLD: 28 % (ref 21–52)
MCH RBC QN AUTO: 31.6 PG (ref 24–34)
MCHC RBC AUTO-ENTMCNC: 31.9 G/DL (ref 31–37)
MCV RBC AUTO: 99 FL (ref 78–100)
MONOCYTES # BLD: 0.2 K/UL (ref 0.05–1.2)
MONOCYTES NFR BLD: 7 % (ref 3–10)
NEUTS SEG # BLD: 1.7 K/UL (ref 1.8–8)
NEUTS SEG NFR BLD: 62 % (ref 40–73)
NRBC # BLD: 0 K/UL (ref 0–0.01)
NRBC BLD-RTO: 0 PER 100 WBC
P-R INTERVAL, ECG05: 202 MS
PERFORMED BY, TECHID: NORMAL
PLATELET # BLD AUTO: 84 K/UL (ref 135–420)
PMV BLD AUTO: 10.1 FL (ref 9.2–11.8)
POTASSIUM SERPL-SCNC: 5.1 MMOL/L (ref 3.5–5.5)
Q-T INTERVAL, ECG07: 480 MS
QRS DURATION, ECG06: 86 MS
QTC CALCULATION (BEZET), ECG08: 510 MS
RBC # BLD AUTO: 5 M/UL (ref 4.2–5.3)
SODIUM SERPL-SCNC: 138 MMOL/L (ref 136–145)
VENTRICULAR RATE, ECG03: 68 BPM
WBC # BLD AUTO: 2.7 K/UL (ref 4.6–13.2)

## 2022-11-23 PROCEDURE — 80048 BASIC METABOLIC PNL TOTAL CA: CPT

## 2022-11-23 PROCEDURE — 90935 HEMODIALYSIS ONE EVALUATION: CPT

## 2022-11-23 PROCEDURE — 87340 HEPATITIS B SURFACE AG IA: CPT

## 2022-11-23 PROCEDURE — 93005 ELECTROCARDIOGRAM TRACING: CPT

## 2022-11-23 PROCEDURE — 36415 COLL VENOUS BLD VENIPUNCTURE: CPT

## 2022-11-23 PROCEDURE — 74011250637 HC RX REV CODE- 250/637: Performed by: NURSE PRACTITIONER

## 2022-11-23 PROCEDURE — 74011000250 HC RX REV CODE- 250: Performed by: INTERNAL MEDICINE

## 2022-11-23 PROCEDURE — 82962 GLUCOSE BLOOD TEST: CPT

## 2022-11-23 PROCEDURE — 74011250636 HC RX REV CODE- 250/636: Performed by: INTERNAL MEDICINE

## 2022-11-23 PROCEDURE — 85025 COMPLETE CBC W/AUTO DIFF WBC: CPT

## 2022-11-23 RX ORDER — SODIUM CHLORIDE 9 MG/ML
2000 INJECTION, SOLUTION INTRAVENOUS ONCE
Status: COMPLETED | OUTPATIENT
Start: 2022-11-23 | End: 2022-11-23

## 2022-11-23 RX ADMIN — ONDANSETRON 4 MG: 4 TABLET, ORALLY DISINTEGRATING ORAL at 12:26

## 2022-11-23 RX ADMIN — SODIUM CHLORIDE 2000 ML: 9 INJECTION, SOLUTION INTRAVENOUS at 15:34

## 2022-11-23 RX ADMIN — LEVOTHYROXINE SODIUM 125 MCG: 0.12 TABLET ORAL at 06:34

## 2022-11-23 RX ADMIN — SODIUM CHLORIDE, PRESERVATIVE FREE 10 ML: 5 INJECTION INTRAVENOUS at 05:56

## 2022-11-23 NOTE — PROGRESS NOTES
Problem: Fluid Volume - Risk of, Imbalanced  Goal: *Balanced intake and output  11/23/2022 0830 by Danis Elvira  Outcome: Progressing Towards Goal  11/23/2022 0830 by Danis Elvira  Outcome: Progressing Towards Goal     Problem: Patient Education: Go to Patient Education Activity  Goal: Patient/Family Education  11/23/2022 0830 by Danis Elvira  Outcome: Progressing Towards Goal  11/23/2022 0830 by Danis Elvira  Outcome: Progressing Towards Goal     Problem: Nutrition Deficit  Goal: *Optimize nutritional status  11/23/2022 0830 by Danis Elvira  Outcome: Progressing Towards Goal  11/23/2022 0830 by Danis Elvira  Outcome: Progressing Towards Goal     Problem: Patient Education: Go to Patient Education Activity  Goal: Patient/Family Education  11/23/2022 0830 by Danis Elvira  Outcome: Progressing Towards Goal  11/23/2022 0830 by Danis Elvira  Outcome: Progressing Towards Goal     Problem: Diabetes Self-Management  Goal: *Disease process and treatment process  Description: Define diabetes and identify own type of diabetes; list 3 options for treating diabetes. 11/23/2022 0830 by Danis Elvira  Outcome: Progressing Towards Goal  11/23/2022 0830 by Divine Buchanan  Outcome: Progressing Towards Goal  Goal: *Incorporating nutritional management into lifestyle  Description: Describe effect of type, amount and timing of food on blood glucose; list 3 methods for planning meals. 11/23/2022 0830 by Danis Elvira  Outcome: Progressing Towards Goal  11/23/2022 0830 by Divine Buchanan  Outcome: Progressing Towards Goal  Goal: *Incorporating physical activity into lifestyle  Description: State effect of exercise on blood glucose levels.   11/23/2022 0830 by Danis Elvira  Outcome: Progressing Towards Goal  11/23/2022 0830 by Danis Elvira  Outcome: Progressing Towards Goal  Goal: *Developing strategies to promote health/change behavior  Description: Define the ABC's of diabetes; identify appropriate screenings, schedule and personal plan for screenings. 11/23/2022 0830 by Maricruz Loyd  Outcome: Progressing Towards Goal  11/23/2022 0830 by Honey Buchanan  Outcome: Progressing Towards Goal  Goal: *Using medications safely  Description: State effect of diabetes medications on diabetes; name diabetes medication taking, action and side effects. 11/23/2022 0830 by Maricruz Loyd  Outcome: Progressing Towards Goal  11/23/2022 0830 by Maricruz Loyd  Outcome: Progressing Towards Goal  Goal: *Monitoring blood glucose, interpreting and using results  Description: Identify recommended blood glucose targets  and personal targets. 11/23/2022 0830 by Maricruz Loyd  Outcome: Progressing Towards Goal  11/23/2022 0830 by Maricruz Loyd  Outcome: Progressing Towards Goal  Goal: *Prevention, detection, treatment of acute complications  Description: List symptoms of hyper- and hypoglycemia; describe how to treat low blood sugar and actions for lowering  high blood glucose level. 11/23/2022 0830 by Maricruz Loyd  Outcome: Progressing Towards Goal  11/23/2022 0830 by Honey Buchanan  Outcome: Progressing Towards Goal  Goal: *Prevention, detection and treatment of chronic complications  Description: Define the natural course of diabetes and describe the relationship of blood glucose levels to long term complications of diabetes.   11/23/2022 0830 by Maricruz Loyd  Outcome: Progressing Towards Goal  11/23/2022 0830 by Maricruz Loyd  Outcome: Progressing Towards Goal  Goal: *Developing strategies to address psychosocial issues  Description: Describe feelings about living with diabetes; identify support needed and support network  11/23/2022 0830 by Maricruz Loyd  Outcome: Progressing Towards Goal  11/23/2022 0830 by Maricruz Loyd  Outcome: Progressing Towards Goal  Goal: *Insulin pump training  11/23/2022 0830 by Kelly Bee  Outcome: Progressing Towards Goal  11/23/2022 0830 by Kelly Bee  Outcome: Progressing Towards Goal  Goal: *Sick day guidelines  11/23/2022 0830 by Kelly Bee  Outcome: Progressing Towards Goal  11/23/2022 0830 by Kelly Bee  Outcome: Progressing Towards Goal  Goal: *Patient Specific Goal (EDIT GOAL, INSERT TEXT)  11/23/2022 0830 by Kelly Bee  Outcome: Progressing Towards Goal  11/23/2022 0830 by Kelly Bee  Outcome: Progressing Towards Goal     Problem: Patient Education: Go to Patient Education Activity  Goal: Patient/Family Education  11/23/2022 0830 by Kelly Bee  Outcome: Progressing Towards Goal  11/23/2022 0830 by Kelly Bee  Outcome: Progressing Towards Goal     Problem: Activity Intolerance  Goal: *Oxygen saturation during activity within specified parameters  11/23/2022 0830 by Maria C Buchanan  Outcome: Progressing Towards Goal  11/23/2022 0830 by Kelly Bee  Outcome: Progressing Towards Goal  Goal: *Able to remain out of bed as prescribed  Outcome: Progressing Towards Goal     Problem: Patient Education: Go to Patient Education Activity  Goal: Patient/Family Education  Outcome: Progressing Towards Goal     Problem: Falls - Risk of  Goal: *Absence of Falls  Description: Document Fernley Fall Risk and appropriate interventions in the flowsheet.   Outcome: Progressing Towards Goal  Note: Fall Risk Interventions:  Mobility Interventions: Bed/chair exit alarm         Medication Interventions: Bed/chair exit alarm    Elimination Interventions: Call light in reach              Problem: Patient Education: Go to Patient Education Activity  Goal: Patient/Family Education  Outcome: Progressing Towards Goal     Problem: Pressure Injury - Risk of  Goal: *Prevention of pressure injury  Description: Document Quinn Scale and appropriate interventions in the flowsheet. Outcome: Progressing Towards Goal     Problem: Patient Education: Go to Patient Education Activity  Goal: Patient/Family Education  Outcome: Progressing Towards Goal     Problem: Risk for Spread of Infection  Goal: Prevent transmission of infectious organism to others  Description: Prevent the transmission of infectious organisms to other patients, staff members, and visitors.   Outcome: Progressing Towards Goal     Problem: Patient Education:  Go to Education Activity  Goal: Patient/Family Education  Outcome: Progressing Towards Goal

## 2022-11-23 NOTE — DIALYSIS
Hemodialysis / [phone]    Vitals Pre Post Assessment Pre Post   BP BP: (!) 144/63 (11/23/22 1145)   174/80 LOC A & O x 4 A & O x 4   HR Pulse (Heart Rate): 69 (11/23/22 1200) 76 Lungs No SOB, room air, clear No SOB   Resp Resp Rate: 18 (11/23/22 1145) 16 Cardiac Regular rate, rhythm Regular rate,rhythm   Temp Temp: 96.8 °F (36 °C) (11/23/22 1145) 97.1 Skin No uncovered wounds noted No uncovered wounds noted   Weight  Obtain by hospital staff  Edema none none   Tele status Monitor by hospital staff  Pain Pain Intensity 1: 0 (11/23/22 0834) none     Orders   Duration: Start: 1425 End: 1725 Total: 3   Dialyzer:  revaclear   K Bath:  2   Ca Bath:  2.5   Na:  138   Bicarb:  35   Target Fluid Removal:  1200 ml     Access   Type & Location: LUE AVF, no s/s of infection noted / bruit and thrill noted / disinfected per P & P , cannulate with 15 gg needles, no difficulty   Comments:                                        Labs   HBsAg (Antigen) / date:       Negative 10/26/22                                        HBsAb (Antibody) / date: Susceptible 10/26/22   Source: Hospital lab   Obtained/Reviewed  Critical Results Called HGB   Date Value Ref Range Status   11/23/2022 15.8 12.0 - 16.0 g/dL Final     Potassium   Date Value Ref Range Status   11/23/2022 5.1 3.5 - 5.5 mmol/L Final     Calcium   Date Value Ref Range Status   11/23/2022 7.4 (L) 8.5 - 10.1 mg/dL Final     BUN   Date Value Ref Range Status   11/23/2022 66 (H) 7 - 18 mg/dL Final     Creatinine   Date Value Ref Range Status   11/23/2022 10.40 (H) 0.60 - 1.30 mg/dL Final        Meds Given   Name Dose Route   none                 Adequacy / Fluid    Total Liters Process: 63   Net Fluid Removed: 1200 ml      Comments   Time Out Done:   (Time) 1420   Admitting Diagnosis: SOB, missed dialysis   Consent obtained/signed:  yes   Machine / RO #  [id number]   Primary Nurse Rpt Pre: LUIS A Buchanan RN   Primary Nurse Rpt Post: LUIS A Buchanan RN   Pt Education: Importance of adhering to dialysis treatment schedule   Care Plan: Continue hemodialysis as ordered   Pts outpatient clinic: [facility]     Tx Summary   Comments:               no SOB, no complaints, no pain, no distress / watched tv and rested much of treatment / post treatment, all possible blood returned Josee Ghosh / avf bruit and thrill noted

## 2022-11-23 NOTE — PROGRESS NOTES
Problem: Fluid Volume - Risk of, Imbalanced  Goal: *Balanced intake and output  11/22/2022 2305 by Quita Barthel. Outcome: Progressing Towards Goal  11/22/2022 2305 by Quita Barthel Outcome: Progressing Towards Goal     Problem: Patient Education: Go to Patient Education Activity  Goal: Patient/Family Education  11/22/2022 2305 by Quita Barthel Outcome: Progressing Towards Goal  11/22/2022 2305 by Quita Barthel Outcome: Progressing Towards Goal     Problem: Nutrition Deficit  Goal: *Optimize nutritional status  11/22/2022 2305 by Quita Barthel Outcome: Progressing Towards Goal  11/22/2022 2305 by Quita Barthel Outcome: Progressing Towards Goal     Problem: Patient Education: Go to Patient Education Activity  Goal: Patient/Family Education  11/22/2022 2305 by Quita Barthel Outcome: Progressing Towards Goal  11/22/2022 2305 by Quita Barthel Outcome: Progressing Towards Goal     Problem: Diabetes Self-Management  Goal: *Disease process and treatment process  Description: Define diabetes and identify own type of diabetes; list 3 options for treating diabetes. 11/22/2022 2305 by Quita Barthel Outcome: Progressing Towards Goal  11/22/2022 2305 by Quita Barthel Outcome: Progressing Towards Goal  Goal: *Incorporating nutritional management into lifestyle  Description: Describe effect of type, amount and timing of food on blood glucose; list 3 methods for planning meals. 11/22/2022 2305 by Quita Barthel Outcome: Progressing Towards Goal  11/22/2022 2305 by Quita Barthel Outcome: Progressing Towards Goal  Goal: *Incorporating physical activity into lifestyle  Description: State effect of exercise on blood glucose levels. 11/22/2022 2305 by Quita Barthel Outcome: Progressing Towards Goal  11/22/2022 2305 by Quita Barthel   Outcome: Progressing Towards Goal  Goal: *Developing strategies to promote health/change behavior  Description: Define the ABC's of diabetes; identify appropriate screenings, schedule and personal plan for screenings. 11/22/2022 2305 by Radha Menendez Outcome: Progressing Towards Goal  11/22/2022 2305 by Radha Fermins Outcome: Progressing Towards Goal  Goal: *Using medications safely  Description: State effect of diabetes medications on diabetes; name diabetes medication taking, action and side effects. 11/22/2022 2305 by Radha Menendez Outcome: Progressing Towards Goal  11/22/2022 2305 by Radha Fermins Outcome: Progressing Towards Goal  Goal: *Monitoring blood glucose, interpreting and using results  Description: Identify recommended blood glucose targets  and personal targets. 11/22/2022 2305 by Radha Menendez Outcome: Progressing Towards Goal  11/22/2022 2305 by Radha Fermins Outcome: Progressing Towards Goal  Goal: *Prevention, detection, treatment of acute complications  Description: List symptoms of hyper- and hypoglycemia; describe how to treat low blood sugar and actions for lowering  high blood glucose level. 11/22/2022 2305 by Radha Menendez Outcome: Progressing Towards Goal  11/22/2022 2305 by Radha Menendez Outcome: Progressing Towards Goal  Goal: *Prevention, detection and treatment of chronic complications  Description: Define the natural course of diabetes and describe the relationship of blood glucose levels to long term complications of diabetes. 11/22/2022 2305 by Radha Menendez Outcome: Progressing Towards Goal  11/22/2022 2305 by Radha Fermins Outcome: Progressing Towards Goal  Goal: *Developing strategies to address psychosocial issues  Description: Describe feelings about living with diabetes; identify support needed and support network  11/22/2022 2305 by Radha Menendez Outcome: Progressing Towards Goal  11/22/2022 2305 by Radha Fermins Outcome: Progressing Towards Goal  Goal: *Insulin pump training  11/22/2022 2305 by Radha Menendez Outcome: Progressing Towards Goal  11/22/2022 2305 by Radha Fermins   Outcome: Progressing Towards Goal  Goal: *Sick day guidelines  11/22/2022 2305 by Cydne Arvin Outcome: Progressing Towards Goal  11/22/2022 2305 by Cydne Arvin Outcome: Progressing Towards Goal  Goal: *Patient Specific Goal (EDIT GOAL, INSERT TEXT)  11/22/2022 2305 by Cydne Arvin Outcome: Progressing Towards Goal  11/22/2022 2305 by Cydne Arvin Outcome: Progressing Towards Goal     Problem: Patient Education: Go to Patient Education Activity  Goal: Patient/Family Education  11/22/2022 2305 by Cydne Arvin Outcome: Progressing Towards Goal  11/22/2022 2305 by Cydne Arvin Outcome: Progressing Towards Goal     Problem: Activity Intolerance  Goal: *Oxygen saturation during activity within specified parameters  11/22/2022 2305 by Cydne Arvin Outcome: Progressing Towards Goal  11/22/2022 2305 by Cydne Arvin Outcome: Progressing Towards Goal  Goal: *Able to remain out of bed as prescribed  11/22/2022 2305 by Cydne Arvin Outcome: Progressing Towards Goal  11/22/2022 2305 by Cydne Arvin Outcome: Progressing Towards Goal     Problem: Patient Education: Go to Patient Education Activity  Goal: Patient/Family Education  11/22/2022 2305 by Cydne Arvin Outcome: Progressing Towards Goal  11/22/2022 2305 by Cydne Arvin Outcome: Progressing Towards Goal     Problem: Falls - Risk of  Goal: *Absence of Falls  Description: Document Julosvaldo Elizabeth Fall Risk and appropriate interventions in the flowsheet. 11/22/2022 2305 by Cydne Arvin Outcome: Progressing Towards Goal  Note: Fall Risk Interventions:  Mobility Interventions: Bed/chair exit alarm         Medication Interventions: Bed/chair exit alarm    Elimination Interventions: Call light in reach           11/22/2022 2305 by Cydne Arvin   Outcome: Progressing Towards Goal     Problem: Patient Education: Go to Patient Education Activity  Goal: Patient/Family Education  Outcome: Progressing Towards Goal     Problem: Pressure Injury - Risk of  Goal: *Prevention of pressure injury  Description: Document Quinn Scale and appropriate interventions in the flowsheet. Outcome: Progressing Towards Goal     Problem: Patient Education: Go to Patient Education Activity  Goal: Patient/Family Education  Outcome: Progressing Towards Goal     Problem: Risk for Spread of Infection  Goal: Prevent transmission of infectious organism to others  Description: Prevent the transmission of infectious organisms to other patients, staff members, and visitors.   Outcome: Progressing Towards Goal     Problem: Patient Education:  Go to Education Activity  Goal: Patient/Family Education  Outcome: Progressing Towards Goal

## 2022-11-23 NOTE — PROGRESS NOTES
Piv removed, verbalizedunderrstanding of discharge instructions, up in room getting dressed, brother on the way to pick patient up

## 2022-11-23 NOTE — PROGRESS NOTES
CARDIOLOGY PROGRESS NOTE - NP    Patient seen and examined. This is a patient who is followed for elevated troponin in the setting of missed HD. She was not dialyzed yesterday with plans to do so today. She continues to feel weak. No nausea. No dyspnea or chest pain. No palpitations. No other complaints reported. Telemetry reviewed, there were no events noted in the past 24 hours. Remains in NSR with 1st degree AV block. Pertinent review of systems items noted above, all other systems are negative. Current medications reviewed. Physical Examination  Vital signs are stable. Blood pressure 154/73, Pulse 67  No apparent distress. Heart is regular, rate and rhythm. Normal S1, S2, no murmurs are appreciated. Lungs are clear bilaterally. Abdomen is soft, nontender, normal bowel sounds. Extremities have no edema. Labs reviewed: K+ 5.1    Case discussed with Dr. Debra Painter and our impression and recommendations are as follows:    Elevated troponin:  Likely demand in the setting of ESRD with missed HD. EKG non-ischemic. Cath 9/2020 with no CAD, only tortuous vessels. She has an allergy to aspirin. Continue statin and Eliqius for PAF. Could consider repeat NST as OP with primary cardiologist if indicated. Echo at [facility] 2/2022 with preserved EF, normal wall motion. Repeat echo showing a preserved EF and normal wall motion. Continue telemetry monitoring. Paroxysmal atrial fibrillation:  Remains on amiodarone 200mg daily. In NSR. EKG with Qtc 510ms today up from 488ms yesterday. Recommend following as OP with primary cardiologist.  Plans for HD today. Continue Eliquis 2.5mg twice daily per guidelines (borderline weight and ESRD). Hypertension:  Elevated this morning with plans for HD this morning. Weakness:  Likely due to electrolyte imbalances and need for HD. Will continue to monitor on telemetry. Please do not hesitate to call me or Dr. Debra Painter if additional questions arise.

## 2022-11-23 NOTE — PROGRESS NOTES
1930  Received care of pt lying in bed watching TV. Pt is alert and oriented x 4. Routine assessment and vs completed. Pt is complaint free at this time. Call bell in reach. 2200  HS meds accepted without difficulty and snack provided. 0100 Sleeping with no distress noted    0400  Pt awakens for vs and voiced no complaints. 0600 Pt sleeping with no distress noted.

## 2022-11-23 NOTE — DISCHARGE SUMMARY
Discharge Summary       Patient ID:  Beulah Jean Baptiste,   74 y.o., female  1955    PCP:  Carmen Michele MD    Admit Date: 11/21/2022  1:39 PM  Discharge Date:  No discharge date for patient encounter. Length of stay: 2 day(s)  Code Status: Full Code  Discharging physician: Priyank Dalal MD    Chief Complaint   Patient presents with    Fatigue       Discharge Medications  Current Discharge Medication List        CONTINUE these medications which have NOT CHANGED    Details   Trelegy Ellipta 100-62.5-25 mcg inhaler INHALE 1 PUFF EVERY DAY. PLEASE CALL AND SCHEDULE AN APPOINTMENT WITH NEW PCP FOR FUTURE REFILLS  Qty: 60 Blister, Refills: 1      meclizine (ANTIVERT) 25 mg tablet TAKE 1 TABLET THREE TIMES DAILY AS NEEDED FOR DIZZINESS  Qty: 90 Tablet, Refills: 0    Associated Diagnoses: Stenosis of left vertebral artery      famotidine (PEPCID) 20 mg tablet Take 20 mg by mouth daily. traZODone (DESYREL) 50 mg tablet Take 1 Tablet by mouth nightly. Indications: insomnia associated with depression  Qty: 90 Tablet, Refills: 0    Associated Diagnoses: Primary insomnia      albuterol (PROVENTIL HFA, VENTOLIN HFA, PROAIR HFA) 90 mcg/actuation inhaler Take 1 Puff by inhalation every four (4) hours as needed for Wheezing. Indications: asthma attack  Qty: 18 g, Refills: 1    Associated Diagnoses: Mild persistent asthma without complication      fluticasone propionate (FLONASE) 50 mcg/actuation nasal spray USE 1 SPRAY IN EACH NOSTRIL EVERY MORNING  Qty: 16 g, Refills: 2      EPINEPHrine (EPIPEN) 0.3 mg/0.3 mL injection INJECT 0.3 ML INTRAMUSCULARLY ONCE AS NEEDED FOR ALLERGIC RESPONSE  Qty: 1 Each, Refills: 1      Dexilant 60 mg CpDB capsule (delayed release) TAKE 1 CAPSULE BY MOUTH IN THE MORNING. Qty: 90 Capsule, Refills: 0    Associated Diagnoses: Gastroesophageal reflux disease with esophagitis without hemorrhage      diclofenac (VOLTAREN) 1 % gel Apply  to affected area four (4) times daily.   Qty: 200 g, Refills: 1    Comments: Has tolerated a similar medication in the past and allergy history is unreliable. ondansetron (ZOFRAN ODT) 4 mg disintegrating tablet Take 2 Tablets by mouth every eight (8) hours as needed for Nausea or Nausea or Vomiting. Qty: 15 Tablet, Refills: 0      polyethylene glycol (Miralax) 17 gram packet Take 1 Packet by mouth two (2) times a day. Qty: 60 Each, Refills: 2      doxercalciferoL (HECTOROL) 4 mcg/2 mL injection 6 mcg by IntraVENous route. epoetin jalen (EPOGEN;PROCRIT) 10,000 unit/mL injection 10,000 Units by SubCUTAneous route. cinacalcet (SENSIPAR) 30 mg tablet Take 30 mg by mouth daily. losartan (COZAAR) 50 mg tablet Take 1 Tablet by mouth daily. Qty: 90 Tablet, Refills: 1    Associated Diagnoses: Hypertension, unspecified type      amiodarone (CORDARONE) 200 mg tablet TAKE 1 TABLET BY MOUTH EVERY DAY  Qty: 90 Tablet, Refills: 1      amLODIPine (NORVASC) 10 mg tablet Take 1 tablet by mouth once daily  Qty: 90 Tablet, Refills: 1    Associated Diagnoses: Hypertension, unspecified type      carvediloL (COREG) 25 mg tablet Take 1 Tablet by mouth two (2) times daily (with meals). Qty: 180 Tablet, Refills: 1      levothyroxine (SYNTHROID) 75 mcg tablet Take 1 Tablet by mouth Daily (before breakfast). Qty: 90 Tablet, Refills: 1      simvastatin (ZOCOR) 20 mg tablet TAKE 1 TABLET BY MOUTH DAILY AS DIRECTED. Qty: 90 Tablet, Refills: 1      valGANciclovir (VALCYTE) 450 mg tablet Take 2 Tablets by mouth in the morning. Qty: 180 Tablet, Refills: 1    Associated Diagnoses: ESRD (end stage renal disease) on dialysis (HCC)      dicyclomine (BENTYL) 10 mg capsule Take 1 Capsule by mouth in the morning. Qty: 90 Capsule, Refills: 1      montelukast (SINGULAIR) 10 mg tablet Take 1 Tablet by mouth in the morning. Qty: 90 Tablet, Refills: 1      hyoscyamine SL (LEVSIN/SL) 0.125 mg SL tablet 1 Tablet by SubLINGual route every four (4) hours as needed (irritable bowel).   Qty: 30 Tablet, Refills: 2    Associated Diagnoses: Gastroesophageal reflux disease with esophagitis without hemorrhage      sucralfate (CARAFATE) 1 gram tablet TAKE 1 TABLET BY MOUTH FOUR TIMES DAILY  Qty: 360 Tablet, Refills: 1      apixaban (ELIQUIS) 2.5 mg tablet Take 1 Tablet by mouth two (2) times a day. Qty: 180 Tablet, Refills: 1      predniSONE (DELTASONE) 5 mg tablet Take 1 Tablet by mouth daily. albuterol (PROVENTIL VENTOLIN) 2.5 mg /3 mL (0.083 %) nebu USE 1 VIAL VIA NEBULIZER THREE TIMES DAILY  Qty: 90 mL, Refills: 3    Associated Diagnoses: Mild intermittent asthma without complication      calcitRIOL (ROCALTROL) 0.5 mcg capsule Take 0.5 mcg by mouth See Admin Instructions. Take on non dialysis days (Tues, Wed, Thur, Sat, Sun)  Dialysis is on Monday and Friday      RenaPlex-D 800 mcg-12.5 mg -2,000 unit tab Take 1 Tablet by mouth daily. sevelamer carbonate (RENVELA) 800 mg tab tab Take 800 mg by mouth three (3) times daily. aluminum & magnesium hydroxide-simethicone (Maalox Maximum Strength) 400-400-40 mg/5 mL suspension Take 10 mL by mouth every six (6) hours as needed for Indigestion. Qty: 250 mL, Refills: 0      ESTRACE 0.01 % (0.1 mg/gram) vaginal cream INSERT 2 GRAMS INTO VAGINA EVERY MONDAY AND THURSDAY  Qty: 42.5 g, Refills: 5      cranberry extract 425 mg cap Take 425 mg by mouth daily. Reason For admission    Active Problems:    Hyperkalemia (11/21/2022)      Elevated troponin (11/21/2022)      Volume overload (11/21/2022)      ESRD needing dialysis [facility]) (11/21/2022)       HPI on Admission (per admitting physician):   Carissa Johnston is a 74 y.o.  female who presents with weakness. Pt feeling week and fatigues yesterday, she normally gets HD on Mond and Friday, she couldn't  herself to HD today so came to ed. She admits headaches, nausea, chest pain. She still makes urine, burns with urination, but ua wnl. No appetite.  There were many issues with her being able to get to HD last admission. Many socoail issues, and there is some suspicion she is using ER as her HD. Her K is elevated, without ecg changes. Will admit for plans for HD in am.  Her trop is elevated, and even though this can be seen with esrd, she had not been elevated in the past. No current cp. The pain she had earlier was L sided dull chest pain. Hospital Course and Discharge Diagnosis   The patient admitted for the following Principal Medical Problem:    Elevated trop, chest pain,   -suspect demand ischemia, in setting of ESRD, missed HD,   -EKG sinus rhythm-non-ischemic  -cont eliquis, BB,   -seen by cardiology, recommends outpatient NST by primary cardiologist.     #2: ESRD on HD, missed HD  - K level is 6 on admit s/p insulin, dextrose, calcium gluconate and kayexelate  -K now improved to 5.1  -cont renal supp, hx failed renal transplant on MF-HD cycle at Dr Jennifer Mcdonough.  -nephrology consult for hemodialysis     #3: HTN:  - cont home oral meds, add prn iv hydralazine     #4: Copd:  - resume all inhalers on dc, duonebs prn while here, currently no acute exacerbations. #5: PAF  -cont amio, eliquis, bb. #6: Hypothyroidism  -tsh was 10.5 in August, now 10.5 again, increase levothyroxine to 125mcg PO daily--repeat tsh in 1 month at pcp's office. #7: Hx cmv:  - cont valcyte, renal transplant has failed,    :     The patient condition became clinically stable and No new complain or complication during the hospital course. The Medication changes discussed with the patient and family on the discharge    Condition at discharge   Afebrile  Ambulating  Eating, Drinking, Voiding  Stable      Physical Exam on Discharge:  Visit Vitals  BP (!) 144/63 (BP 1 Location: Right lower arm, BP Patient Position: At rest)   Pulse 69   Temp 96.8 °F (36 °C)   Resp 18   Ht 5' 1\" (1.549 m)   Wt 67.1 kg (148 lb)   SpO2 100%   BMI 27.96 kg/m²       General Appearance:   Appears in no acute distress. , Sitting up.,   Skin:   Skin warm & dry, No rash, No jaundice,   Lymph: There is no lymphadenopathy,   HEENT:   PERRLA, EOMI, Moist oral mucous membranes, conjunctiva clear,   Neck:   Supple, Without masses,   Lungs:   Clear, No wheezes. , No rales. , Normal respiratory effort,   Heart:   Regular rate and rhythm, No gallop,   Abdomen:   Soft , Non-distended, Normal bowel sounds and Non-tender,   Extremities:  no edema of legs, Normal pedal and radial pulses,   Neuro:    alert, oriented, affect appropriate, speech fluent, cranial nerves intact, no focal neurological deficits and moves all extremities well    Procedures:    No discharge procedures on file. Consultants/Treatment Team:    Treatment Team: Attending Provider: Jose Gregory MD; Primary Nurse: Hank Brown; Consulting Provider: Neil Chino NP; Consulting Provider: Paola Aden MD; Consulting Provider: Alexsander Lewis MD; Utilization Review: Della Moser;  Charge Nurse: Danis Felix    Disposition:    Home    Follow Up   Caroline Jha MD    Most Recent Labs:  Recent Results (from the past 24 hour(s))   GLUCOSE, POC    Collection Time: 11/22/22  4:36 PM   Result Value Ref Range    Glucose (POC) 92 70 - 110 mg/dL    Performed by Lelia Kaur    GLUCOSE, POC    Collection Time: 11/22/22  9:08 PM   Result Value Ref Range    Glucose (POC) 124 (H) 70 - 110 mg/dL    Performed by [address], POC    Collection Time: 11/23/22  7:15 AM   Result Value Ref Range    Glucose (POC) 93 70 - 110 mg/dL    Performed by Lelia Kaur    CBC WITH AUTOMATED DIFF    Collection Time: 11/23/22  7:20 AM   Result Value Ref Range    WBC 2.7 (L) 4.6 - 13.2 K/uL    RBC 5.00 4.20 - 5.30 M/uL    HGB 15.8 12.0 - 16.0 g/dL    HCT 49.5 (H) 35.0 - 45.0 %    MCV 99.0 78.0 - 100.0 FL    MCH 31.6 24.0 - 34.0 PG    MCHC 31.9 31.0 - 37.0 g/dL    RDW 15.2 (H) 11.6 - 14.5 %    PLATELET 84 (L) 837 - 420 K/uL    MPV 10.1 9.2 - 11.8 FL    NRBC 0.0 0.0  WBC ABSOLUTE NRBC 0.00 0.00 - 0.01 K/uL    NEUTROPHILS 62 40 - 73 %    LYMPHOCYTES 28 21 - 52 %    MONOCYTES 7 3 - 10 %    EOSINOPHILS 2 0 - 5 %    BASOPHILS 1 0 - 2 %    IMMATURE GRANULOCYTES 0 0 - 0.5 %    ABS. NEUTROPHILS 1.7 (L) 1.8 - 8.0 K/UL    ABS. LYMPHOCYTES 0.8 (L) 0.9 - 3.6 K/UL    ABS. MONOCYTES 0.2 0.05 - 1.2 K/UL    ABS. EOSINOPHILS 0.1 0.0 - 0.4 K/UL    ABS. BASOPHILS 0.0 0.0 - 0.1 K/UL    ABS. IMM.  GRANS. 0.0 0.00 - 0.04 K/UL    DF AUTOMATED     METABOLIC PANEL, BASIC    Collection Time: 11/23/22  7:20 AM   Result Value Ref Range    Sodium 138 136 - 145 mmol/L    Potassium 5.1 3.5 - 5.5 mmol/L    Chloride 104 100 - 111 mmol/L    CO2 21 21 - 32 mmol/L    Anion gap 13 3.0 - 18.0 mmol/L    Glucose 89 74 - 99 mg/dL    BUN 66 (H) 7 - 18 mg/dL    Creatinine 10.40 (H) 0.60 - 1.30 mg/dL    BUN/Creatinine ratio 6 (L) 12 - 20      eGFR 4 (L) >60 ml/min/1.73m2    Calcium 7.4 (L) 8.5 - 10.1 mg/dL   EKG, 12 LEAD, SUBSEQUENT    Collection Time: 11/23/22 10:14 AM   Result Value Ref Range    Ventricular Rate 68 BPM    Atrial Rate 68 BPM    P-R Interval 202 ms    QRS Duration 86 ms    Q-T Interval 480 ms    QTC Calculation (Bezet) 510 ms    Calculated P Axis 56 degrees    Calculated R Axis 68 degrees    Calculated T Axis 58 degrees    Diagnosis       Normal sinus rhythm  T wave abnormality, consider anterior ischemia  Prolonged QT  Abnormal ECG  When compared with ECG of 22-NOV-2022 04:46,  PREVIOUS ECG IS PRESENT       Recent Labs     11/23/22  0720   BUN 66*      CO2 21     Recent Labs     11/23/22  0720   WBC 2.7*   RBC 5.00   HCT 49.5*   MCV 99.0   MCH 31.6   MCHC 31.9   RDW 15.2*       XR Results:  Results from Hospital Encounter encounter on 11/21/22    XR CHEST PORT    Narrative  EXAM: XR CHEST PORT    CLINICAL INDICATION/HISTORY: fatigue, chest pain  -Additional: None    COMPARISON: Radiographs 10/22/2022    TECHNIQUE: Frontal view of the chest    _______________    FINDINGS:    HEART AND MEDIASTINUM: Normal cardiac size and mediastinal contours. LUNGS AND PLEURAL SPACES: Mild increase in pulmonary vascular prominence. No  pneumothorax or definite pleural effusion. No dense consolidation. BONY THORAX AND SOFT TISSUES: No acute osseous abnormality    _______________    Impression  Mild interval increase in pulmonary vascular congestion without superimposed  acute abnormality. CT Results:  Results from Hospital Encounter encounter on 10/26/22    CT ABD PELV WO CONT    Narrative  EXAM: CT of the abdomen and pelvis    INDICATION: Pain. COMPARISON: 9/10/2022    TECHNIQUE: Axial CT imaging of the abdomen and pelvis was performed without  intravenous contrast. Multiplanar reformats were generated. One or more dose reduction techniques were used on this CT: automated exposure  control, adjustment of the mAs and/or kVp according to patient size, and  iterative reconstruction techniques. The specific techniques used on this CT  exam have been documented in the patient's electronic medical record. Digital  Imaging and Communications in Medicine (DICOM) format image data are available  to nonaffiliated external healthcare facilities or entities on a secure, media  free, reciprocally searchable basis with patient authorization for at least a  12-month period after this study. _______________    FINDINGS:    LOWER CHEST: Cardiomegaly. LIVER, BILIARY: Liver is normal. No biliary dilation. Cholecystectomy. PANCREAS: Normal.    SPLEEN: Normal.    ADRENALS: Normal.    KIDNEYS/URETERS/BLADDER: Atrophic kidneys. Right lower quadrant transplanted  kidney with perinephric stranding, unchanged. No hydronephrosis. PELVIC ORGANS: Unremarkable. VASCULATURE: Vascular calcifications. LYMPH NODES: No enlarged lymph nodes. GASTROINTESTINAL TRACT: Small hiatal hernia. No bowel dilation or wall  thickening. Diverticulosis. BONES: No acute or aggressive osseous abnormalities identified.     OTHER: None.    _______________    Impression  No acute intra-abdominal abnormality. Right lower quadrant transplanted kidney with perinephric stranding, unchanged. No hydronephrosis. Diverticulosis. Cardiomegaly. MRI Results:  Results from Office Visit encounter on 05/09/12    MRI PELV WO CONT      Nuclear Medicine Results:  No results found for this or any previous visit.         Asia Amato MD   Hospitalist

## 2022-11-23 NOTE — PROGRESS NOTES
Problem: Fluid Volume - Risk of, Imbalanced  Goal: *Balanced intake and output  11/23/2022 1800 by Charlyne Salt  Outcome: Resolved/Met  11/23/2022 0830 by Charlyne Salt  Outcome: Progressing Towards Goal     Problem: Patient Education: Go to Patient Education Activity  Goal: Patient/Family Education  11/23/2022 1800 by Charlyne Salt  Outcome: Resolved/Met  11/23/2022 0830 by Charlyne Salt  Outcome: Progressing Towards Goal     Problem: Nutrition Deficit  Goal: *Optimize nutritional status  11/23/2022 1800 by Charlyne Salt  Outcome: Resolved/Met  11/23/2022 0830 by Charlyne Salt  Outcome: Progressing Towards Goal     Problem: Patient Education: Go to Patient Education Activity  Goal: Patient/Family Education  11/23/2022 1800 by Charlyne Salt  Outcome: Resolved/Met  11/23/2022 0830 by Charlyne Salt  Outcome: Progressing Towards Goal     Problem: Diabetes Self-Management  Goal: *Disease process and treatment process  Description: Define diabetes and identify own type of diabetes; list 3 options for treating diabetes. 11/23/2022 1800 by Charlyne Salt  Outcome: Resolved/Met  11/23/2022 0830 by Charlyne Salt  Outcome: Progressing Towards Goal  Goal: *Incorporating nutritional management into lifestyle  Description: Describe effect of type, amount and timing of food on blood glucose; list 3 methods for planning meals. 11/23/2022 1800 by Charlyne Salt  Outcome: Resolved/Met  11/23/2022 0830 by Charlyne Salt  Outcome: Progressing Towards Goal  Goal: *Incorporating physical activity into lifestyle  Description: State effect of exercise on blood glucose levels.   11/23/2022 1800 by Charlyne Salt  Outcome: Resolved/Met  11/23/2022 0830 by Charlyne Salt  Outcome: Progressing Towards Goal  Goal: *Developing strategies to promote health/change behavior  Description: Define the ABC's of diabetes; identify appropriate screenings, schedule and personal plan for screenings. 11/23/2022 1800 by Melissa Dhillon  Outcome: Resolved/Met  11/23/2022 0830 by Melissa Dhillon  Outcome: Progressing Towards Goal  Goal: *Using medications safely  Description: State effect of diabetes medications on diabetes; name diabetes medication taking, action and side effects. 11/23/2022 1800 by Melissa Dhillon  Outcome: Resolved/Met  11/23/2022 0830 by Melissa Dhillon  Outcome: Progressing Towards Goal  Goal: *Monitoring blood glucose, interpreting and using results  Description: Identify recommended blood glucose targets  and personal targets. 11/23/2022 1800 by Melissa Dhillon  Outcome: Resolved/Met  11/23/2022 0830 by Melissa Dhillon  Outcome: Progressing Towards Goal  Goal: *Prevention, detection, treatment of acute complications  Description: List symptoms of hyper- and hypoglycemia; describe how to treat low blood sugar and actions for lowering  high blood glucose level. 11/23/2022 1800 by Melissa Dhillon  Outcome: Resolved/Met  11/23/2022 0830 by Melissa Dhillon  Outcome: Progressing Towards Goal  Goal: *Prevention, detection and treatment of chronic complications  Description: Define the natural course of diabetes and describe the relationship of blood glucose levels to long term complications of diabetes.   11/23/2022 1800 by Melissa Dhillon  Outcome: Resolved/Met  11/23/2022 0830 by Melissa Dhillon  Outcome: Progressing Towards Goal  Goal: *Developing strategies to address psychosocial issues  Description: Describe feelings about living with diabetes; identify support needed and support network  11/23/2022 1800 by Melsisa Dhillon  Outcome: Resolved/Met  11/23/2022 0830 by Melissa Dhillon  Outcome: Progressing Towards Goal  Goal: *Insulin pump training  11/23/2022 1800 by Melissa Dhillon  Outcome: Resolved/Met  11/23/2022 0830 by Melissa Dhillon  Outcome: Progressing Towards Goal  Goal: *Sick day guidelines  11/23/2022 1800 by Paul Morales  Outcome: Resolved/Met  11/23/2022 0830 by Paul Morales  Outcome: Progressing Towards Goal  Goal: *Patient Specific Goal (EDIT GOAL, INSERT TEXT)  11/23/2022 1800 by Paul Morales  Outcome: Resolved/Met  11/23/2022 0830 by Paul Morales  Outcome: Progressing Towards Goal     Problem: Patient Education: Go to Patient Education Activity  Goal: Patient/Family Education  11/23/2022 1800 by Paul Morales  Outcome: Resolved/Met  11/23/2022 0830 by Paul Morales  Outcome: Progressing Towards Goal     Problem: Activity Intolerance  Goal: *Oxygen saturation during activity within specified parameters  11/23/2022 1800 by Paul Morales  Outcome: Resolved/Met  11/23/2022 0830 by Paul Morales  Outcome: Progressing Towards Goal  Goal: *Able to remain out of bed as prescribed  11/23/2022 1800 by Paul Morales  Outcome: Resolved/Met  11/23/2022 0830 by Paul Morales  Outcome: Progressing Towards Goal     Problem: Patient Education: Go to Patient Education Activity  Goal: Patient/Family Education  11/23/2022 1800 by Paul Morales  Outcome: Resolved/Met  11/23/2022 0830 by Paul Morales  Outcome: Progressing Towards Goal     Problem: Falls - Risk of  Goal: *Absence of Falls  Description: Document Sofia Fothergill Fall Risk and appropriate interventions in the flowsheet.   11/23/2022 1800 by Paul Morales  Outcome: Resolved/Met  11/23/2022 0830 by Paul Morales  Outcome: Progressing Towards Goal  Note: Fall Risk Interventions:  Mobility Interventions: Bed/chair exit alarm         Medication Interventions: Bed/chair exit alarm    Elimination Interventions: Call light in reach              Problem: Patient Education: Go to Patient Education Activity  Goal: Patient/Family Education  11/23/2022 1800 by Paul Morales  Outcome: Resolved/Met  11/23/2022 0830 by Emerson Buchanan Heading  Outcome: Progressing Towards Goal     Problem: Pressure Injury - Risk of  Goal: *Prevention of pressure injury  Description: Document Quinn Scale and appropriate interventions in the flowsheet. 11/23/2022 1800 by Annel Douglas  Outcome: Resolved/Met  11/23/2022 0830 by Annel Douglas  Outcome: Progressing Towards Goal     Problem: Patient Education: Go to Patient Education Activity  Goal: Patient/Family Education  11/23/2022 1800 by Annel Douglas  Outcome: Resolved/Met  11/23/2022 0830 by Annel Douglas  Outcome: Progressing Towards Goal     Problem: Risk for Spread of Infection  Goal: Prevent transmission of infectious organism to others  Description: Prevent the transmission of infectious organisms to other patients, staff members, and visitors.   11/23/2022 1800 by Annel Douglas  Outcome: Resolved/Met  11/23/2022 0830 by Annel Douglas  Outcome: Progressing Towards Goal     Problem: Patient Education:  Go to Education Activity  Goal: Patient/Family Education  11/23/2022 1800 by Annel Douglas  Outcome: Resolved/Met  11/23/2022 0830 by Annel Douglas  Outcome: Progressing Towards Goal

## 2022-11-25 ENCOUNTER — PATIENT OUTREACH (OUTPATIENT)
Dept: CASE MANAGEMENT | Age: 67
End: 2022-11-25

## 2022-11-25 NOTE — PROGRESS NOTES
Care Transitions Initial Call    Call within 2 business days of discharge: Yes     Patient: Beulah Jean Baptiste Patient : 1955 MRN: [id number]    Last Discharge  Street       Date Complaint Diagnosis Description Type Department Provider    22 Fatigue Hypervolemia, unspecified hypervolemia type . .. ED to Hosp-Admission (Discharged) (ADMIT) [facility] Opal Tai MD; Gloria Hess. .. Was this an external facility discharge? No Discharge Facility: n/a    CTN Attempt to contact patient via telephone on 22 for follow up. Unable to reach. Unable to leave a vm. Recording \"can't accept more messages. \"    15:03 PM     CTN Attempt to contact patient via telephone on 22 for follow up. Unable to reach. Unable to leave a vm. Recording \"can't accept more messages.  \"    [facility] follow up appointment(s):   Future Appointments   Date Time Provider Elena Coello   2022  4:15 PM Carmen Michele MD [facility] BS AMB

## 2022-11-28 ENCOUNTER — PATIENT OUTREACH (OUTPATIENT)
Dept: CASE MANAGEMENT | Age: 67
End: 2022-11-28

## 2022-11-28 RX ORDER — ONDANSETRON 4 MG/1
8 TABLET, ORALLY DISINTEGRATING ORAL
Qty: 15 TABLET | Refills: 0 | Status: SHIPPED | OUTPATIENT
Start: 2022-11-28

## 2022-11-28 NOTE — PROGRESS NOTES
Care Transitions Initial Call    Call within 2 business days of discharge: yes     Patient: Alexander Waddell Patient : 1955 MRN: [id number]    Last Discharge  Street       Date Complaint Diagnosis Description Type Department Provider    22 Fatigue Hypervolemia, unspecified hypervolemia type . .. ED to Hosp-Admission (Discharged) (ADMIT) [facility] Gricelda Rivera MD; Damaso Dose. .. Was this an external facility discharge? No   Discharge Facility: [address] [place] [address] Transitions Nurse ( CTN) attempted to contact patient via telephone call to the listed home phone number. There was no response and no option to leave a voicemail message at this time. Care Transitions Nurse ( CTN) attempted to contact patient via telephone call to the listed mobile phone number. An individual answered and related that her name is Yesica and she is patient's daughter. Patient's daughter related that patient may still be sleeping and that she has dialysis today. Patient's daughter related to please call patient at the listed home number, the [phone] number. CTN thanked patient's  family member and the call was concluded. No Medical information was discussed during the phone contact.

## 2022-12-05 ENCOUNTER — PATIENT OUTREACH (OUTPATIENT)
Dept: CASE MANAGEMENT | Age: 67
End: 2022-12-05

## 2022-12-05 NOTE — PROGRESS NOTES
Care Transitions Follow Up Call    CTN Attempt to contact patient via telephone on 12/5/22 for post hospital follow up. Unable to reach. Unable to leave a voice message. Recording \" can't accept anymore messages. \"    [facility] follow up appointment(s):   Future Appointments   Date Time Provider Elena Coello   12/5/2022  4:15 PM Marquise Epps MD [facility] BS AMB

## 2022-12-20 ENCOUNTER — PATIENT OUTREACH (OUTPATIENT)
Dept: CASE MANAGEMENT | Age: 67
End: 2022-12-20

## 2022-12-21 ENCOUNTER — PATIENT OUTREACH (OUTPATIENT)
Dept: CASE MANAGEMENT | Age: 67
End: 2022-12-21

## 2022-12-21 DIAGNOSIS — I65.02 STENOSIS OF LEFT VERTEBRAL ARTERY: ICD-10-CM

## 2022-12-21 PROBLEM — R79.89 ELEVATED TROPONIN: Status: RESOLVED | Noted: 2022-11-21 | Resolved: 2022-12-21

## 2022-12-21 PROBLEM — R77.8 ELEVATED TROPONIN: Status: RESOLVED | Noted: 2022-11-21 | Resolved: 2022-12-21

## 2022-12-21 RX ORDER — MECLIZINE HYDROCHLORIDE 25 MG/1
TABLET ORAL
Qty: 90 TABLET | Refills: 0 | Status: SHIPPED | OUTPATIENT
Start: 2022-12-21

## 2022-12-21 NOTE — PROGRESS NOTES
Care Transitions Follow Up Call    CTN spoke with Pt. On phone. Pt. Verified her identity. Pt. Reported that she is doing fine. Pt. Denied feeling fatigue. Offer PCP appt. Pt. States okay. PCP appt. Request sent. Pt. Kept the conversation short.

## 2022-12-21 NOTE — PROGRESS NOTES
Care Transitions Follow Up Call    CTN Attempt to contact patient via telephone on 12/21/22 for follow up. Unable to reach. Unable to leave a voice message. Recording \" voice mailbox is unavailable.  \"

## 2023-01-04 ENCOUNTER — PATIENT OUTREACH (OUTPATIENT)
Dept: CASE MANAGEMENT | Age: 68
End: 2023-01-04

## 2023-01-04 NOTE — PROGRESS NOTES
Patient has graduated from the Transitions of Care Coordination  program on 1/4/23. CTN Attempt to contact patient via telephone on 1/4/23 for follow up. Unable to reach. Unable to leave a voice message. Recording \" Can't accept anymore messages. \"    Noted no [facility]  hospital admission post 30 days from discharge date 11/23/22. This episode is closed. Post Hospitalization Encounter Resolved.     Patient's upcoming visits:    Future Appointments   Date Time Provider Elena Coello   2/9/2023  8:45 AM Mackenzie Noel MD [facility] BS AMB

## 2023-01-12 DIAGNOSIS — Z99.2 ESRD (END STAGE RENAL DISEASE) ON DIALYSIS (HCC): ICD-10-CM

## 2023-01-12 DIAGNOSIS — N18.6 ESRD (END STAGE RENAL DISEASE) ON DIALYSIS (HCC): ICD-10-CM

## 2023-01-12 DIAGNOSIS — I10 HYPERTENSION, UNSPECIFIED TYPE: ICD-10-CM

## 2023-01-13 RX ORDER — APIXABAN 2.5 MG/1
TABLET, FILM COATED ORAL
Qty: 180 TABLET | Refills: 1 | Status: SHIPPED | OUTPATIENT
Start: 2023-01-13

## 2023-01-13 RX ORDER — SUCRALFATE 1 G/1
TABLET ORAL
Qty: 360 TABLET | Refills: 1 | Status: SHIPPED | OUTPATIENT
Start: 2023-01-13

## 2023-01-13 RX ORDER — MONTELUKAST SODIUM 10 MG/1
TABLET ORAL
Qty: 90 TABLET | Refills: 1 | Status: SHIPPED | OUTPATIENT
Start: 2023-01-13

## 2023-01-13 RX ORDER — LOSARTAN POTASSIUM 50 MG/1
TABLET ORAL
Qty: 90 TABLET | Refills: 1 | Status: SHIPPED | OUTPATIENT
Start: 2023-01-13

## 2023-01-13 RX ORDER — SIMVASTATIN 20 MG/1
TABLET, FILM COATED ORAL
Qty: 90 TABLET | Refills: 1 | Status: SHIPPED | OUTPATIENT
Start: 2023-01-13

## 2023-01-13 RX ORDER — CARVEDILOL 25 MG/1
TABLET ORAL
Qty: 180 TABLET | Refills: 1 | Status: SHIPPED | OUTPATIENT
Start: 2023-01-13

## 2023-01-13 RX ORDER — VALGANCICLOVIR 450 MG/1
TABLET, FILM COATED ORAL
Qty: 180 TABLET | Refills: 1 | Status: SHIPPED | OUTPATIENT
Start: 2023-01-13

## 2023-01-13 RX ORDER — DICYCLOMINE HYDROCHLORIDE 10 MG/1
CAPSULE ORAL
Qty: 90 CAPSULE | Refills: 1 | Status: SHIPPED | OUTPATIENT
Start: 2023-01-13

## 2023-01-13 RX ORDER — CLOPIDOGREL BISULFATE 75 MG/1
TABLET ORAL
Qty: 90 TABLET | Refills: 1 | Status: SHIPPED | OUTPATIENT
Start: 2023-01-13

## 2023-01-13 RX ORDER — LEVOTHYROXINE SODIUM 75 UG/1
TABLET ORAL
Qty: 90 TABLET | Refills: 1 | Status: SHIPPED | OUTPATIENT
Start: 2023-01-13

## 2023-01-13 RX ORDER — AMIODARONE HYDROCHLORIDE 200 MG/1
TABLET ORAL
Qty: 90 TABLET | Refills: 1 | Status: SHIPPED | OUTPATIENT
Start: 2023-01-13

## 2023-02-01 ENCOUNTER — PATIENT OUTREACH (OUTPATIENT)
Dept: CASE MANAGEMENT | Age: 68
End: 2023-02-01

## 2023-02-01 NOTE — PROGRESS NOTES
Attempted to enroll patient for Complex Care Management. Unable to leave message due to no answer, VM not set up or VM full. Will attempt contact again at a later date.

## 2023-02-03 ENCOUNTER — PATIENT OUTREACH (OUTPATIENT)
Dept: CASE MANAGEMENT | Age: 68
End: 2023-02-03

## 2023-02-03 NOTE — PROGRESS NOTES
Date/Time:  2/3/2023 4:11 PM    Method of communication with patient:phone    [facility] ([facility]) contacted the family by telephone to perform Ambulatory Care Coordination. Verified name and  (PHI) with family as identifiers. Provided introduction to self, and explanation of the Ambulatory Care Manager's role. Encounter Note:   Patient enrolled in Complex Case Management effective 2/3/2023 and will be followed per [facility] protocol. Initial questions answered with subsequent encounters planned. Reviewed upcoming appointments - verified that patient could attend appointments  Further questions answered as needed, patient has [facility] contact information  Patient at dialysis at time of call - will do more complete assessment at next encounter. Patient doing well at present. Preliminary review of medications done during this encounter - will do full med rec on next encounter  Depression screen done - PHQ2 score = 0    Reviewed most recent clinic visit w/ family who verbalized understanding. Family given an opportunity to ask questions. Notes / Challenges    NA       The family agrees to contact the PCP office or the [facility] for questions related to their healthcare. Provided contact information for future reference. Disease Specific:   N/A    Home Health Active: No    DME Active: No    Barriers to care? depression, lack of knowledge about disease, medication management    ACP Decision maker    Primary Decision MakerTomma Merlin - Daughter - Slipager [phone]   Confirm Advance Directive None   Patient Would Like to Complete Advance Directive -       Medication(s):   Medication reconciliation was not performed with family, who verbalizes understanding of administration of home medications. There were no barriers to obtaining medications identified at this time.          Current Outpatient Medications   Medication Sig    clopidogreL (PLAVIX) 75 mg tab TAKE 1 TABLET EVERY MORNING    Eliquis 2.5 mg tablet TAKE 1 TABLET TWICE DAILY    losartan (COZAAR) 50 mg tablet TAKE 1 TABLET EVERY MORNING    levothyroxine (SYNTHROID) 75 mcg tablet TAKE 1 TABLET EVERY DAY BEFORE BREAKFAST    montelukast (SINGULAIR) 10 mg tablet TAKE 1 TABLET EVERY MORNING    sucralfate (CARAFATE) 1 gram tablet TAKE 1 TABLET FOUR TIMES DAILY    valGANciclovir (VALCYTE) 450 mg tablet TAKE 2 TABLETS EVERY MORNING    amiodarone (CORDARONE) 200 mg tablet TAKE 1 TABLET EVERY MORNING    simvastatin (ZOCOR) 20 mg tablet TAKE 1 TABLET EVERY DAY AS DIRECTED    carvediloL (COREG) 25 mg tablet TAKE 1 TABLET TWICE DAILY WITH MEALS    dicyclomine (BENTYL) 10 mg capsule TAKE 1 CAPSULE EVERY MORNING    amLODIPine (NORVASC) 10 mg tablet TAKE 1 TABLET EVERY DAY    meclizine (ANTIVERT) 25 mg tablet TAKE 1 TABLET THREE TIMES DAILY AS NEEDED FOR DIZZINESS    ondansetron (ZOFRAN ODT) 4 mg disintegrating tablet Take 2 Tablets by mouth every eight (8) hours as needed for Nausea or Nausea or Vomiting. Trelegy Ellipta 100-62.5-25 mcg inhaler INHALE 1 PUFF EVERY DAY. PLEASE CALL AND SCHEDULE AN APPOINTMENT WITH NEW PCP FOR FUTURE REFILLS    famotidine (PEPCID) 20 mg tablet Take 20 mg by mouth daily. traZODone (DESYREL) 50 mg tablet Take 1 Tablet by mouth nightly. Indications: insomnia associated with depression    albuterol (PROVENTIL HFA, VENTOLIN HFA, PROAIR HFA) 90 mcg/actuation inhaler Take 1 Puff by inhalation every four (4) hours as needed for Wheezing. Indications: asthma attack    fluticasone propionate (FLONASE) 50 mcg/actuation nasal spray USE 1 SPRAY IN EACH NOSTRIL EVERY MORNING    EPINEPHrine (EPIPEN) 0.3 mg/0.3 mL injection INJECT 0.3 ML INTRAMUSCULARLY ONCE AS NEEDED FOR ALLERGIC RESPONSE    Dexilant 60 mg CpDB capsule (delayed release) TAKE 1 CAPSULE BY MOUTH IN THE MORNING. diclofenac (VOLTAREN) 1 % gel Apply  to affected area four (4) times daily.     polyethylene glycol (Miralax) 17 gram packet Take 1 Packet by mouth two (2) times a day. doxercalciferoL (HECTOROL) 4 mcg/2 mL injection 6 mcg by IntraVENous route. epoetin jalen (EPOGEN;PROCRIT) 10,000 unit/mL injection 10,000 Units by SubCUTAneous route. cinacalcet (SENSIPAR) 30 mg tablet Take 30 mg by mouth daily. hyoscyamine SL (LEVSIN/SL) 0.125 mg SL tablet 1 Tablet by SubLINGual route every four (4) hours as needed (irritable bowel). predniSONE (DELTASONE) 5 mg tablet Take 1 Tablet by mouth daily. albuterol (PROVENTIL VENTOLIN) 2.5 mg /3 mL (0.083 %) nebu USE 1 VIAL VIA NEBULIZER THREE TIMES DAILY    calcitRIOL (ROCALTROL) 0.5 mcg capsule Take 0.5 mcg by mouth See Admin Instructions. Take on non dialysis days (Tues, Wed, Thur, Sat, Sun)  Dialysis is on Monday and Friday    RenaPlex-D 800 mcg-12.5 mg -2,000 unit tab Take 1 Tablet by mouth daily. sevelamer carbonate (RENVELA) 800 mg tab tab Take 800 mg by mouth three (3) times daily. aluminum & magnesium hydroxide-simethicone (Maalox Maximum Strength) 400-400-40 mg/5 mL suspension Take 10 mL by mouth every six (6) hours as needed for Indigestion. ESTRACE 0.01 % (0.1 mg/gram) vaginal cream INSERT 2 GRAMS INTO VAGINA EVERY MONDAY AND THURSDAY    cranberry extract 425 mg cap Take 425 mg by mouth daily. No current facility-administered medications for this visit.        [facility] follow up appointment(s):   Future Appointments   Date Time Provider Elena Coello   2/9/2023  8:45 AM Gilmer Doherty MD [facility] BS AMB           Goals Addressed                   This Visit's Progress     Attend follow up appointments on schedule   On track     Prepare patients and caregivers for end of life decisions (ie. need for hospice, pain management, symptom relief, advance directives etc.)   On track     Take all medications as ordered   On track

## 2023-02-08 DIAGNOSIS — F51.01 PRIMARY INSOMNIA: ICD-10-CM

## 2023-02-08 RX ORDER — TRAZODONE HYDROCHLORIDE 50 MG/1
TABLET ORAL
Qty: 90 TABLET | Refills: 0 | Status: SHIPPED | OUTPATIENT
Start: 2023-02-08

## 2023-02-09 ENCOUNTER — PATIENT OUTREACH (OUTPATIENT)
Dept: CASE MANAGEMENT | Age: 68
End: 2023-02-09

## 2023-02-09 NOTE — PROGRESS NOTES
Date/Time:  2023 4:11 PM    Method of communication with patient:phone    [facility] ([facility]) contacted the family by telephone to perform Ambulatory Care Coordination. Verified name and  (PHI) with family as identifiers. Provided introduction to self, and explanation of the Ambulatory Care Manager's role. Encounter Note:   Spoke with patient - pdw  Reviewed upcoming appointments - verified that patient could attend appointments  Further questions answered as needed, patient has ACM contact information  Shows understanding of medication therapy and importance of following therapy as prescribed. Reviewed status of supply and any refills needed. Questions answered as needed. Discussed ways to assure meds are taken on time each day. Use of labeled dispensers, etc.   See Care Mangement Documentation Checklist for assessment   Depression screen done - PHQ2 score = 0    Reviewed most recent clinic visit w/ family who verbalized understanding. Family given an opportunity to ask questions. Notes / Challenges    NA       The family agrees to contact the PCP office or the [facility] for questions related to their healthcare. Provided contact information for future reference. Disease Specific:   N/A    Home Health Active: No    DME Active: No    Barriers to care? depression, lack of knowledge about disease, medication management    ACP Decision maker    Primary Decision MakerHui Maryann - Daughter - Slipager [phone]   Confirm Advance Directive None   Patient Would Like to Complete Advance Directive -       Medication(s):   Medication reconciliation was performed with family, who verbalizes understanding of administration of home medications. There were no barriers to obtaining medications identified at this time.          Current Outpatient Medications   Medication Sig    traZODone (DESYREL) 50 mg tablet TAKE 1 TABLET BY MOUTH EVERY NIGHT FOR INSOMNIA ASSOCIATED WITH DEPRESSION    clopidogreL (PLAVIX) 75 mg tab TAKE 1 TABLET EVERY MORNING    Eliquis 2.5 mg tablet TAKE 1 TABLET TWICE DAILY    losartan (COZAAR) 50 mg tablet TAKE 1 TABLET EVERY MORNING    levothyroxine (SYNTHROID) 75 mcg tablet TAKE 1 TABLET EVERY DAY BEFORE BREAKFAST    montelukast (SINGULAIR) 10 mg tablet TAKE 1 TABLET EVERY MORNING    sucralfate (CARAFATE) 1 gram tablet TAKE 1 TABLET FOUR TIMES DAILY    valGANciclovir (VALCYTE) 450 mg tablet TAKE 2 TABLETS EVERY MORNING    amiodarone (CORDARONE) 200 mg tablet TAKE 1 TABLET EVERY MORNING    simvastatin (ZOCOR) 20 mg tablet TAKE 1 TABLET EVERY DAY AS DIRECTED    carvediloL (COREG) 25 mg tablet TAKE 1 TABLET TWICE DAILY WITH MEALS    dicyclomine (BENTYL) 10 mg capsule TAKE 1 CAPSULE EVERY MORNING    amLODIPine (NORVASC) 10 mg tablet TAKE 1 TABLET EVERY DAY    meclizine (ANTIVERT) 25 mg tablet TAKE 1 TABLET THREE TIMES DAILY AS NEEDED FOR DIZZINESS    ondansetron (ZOFRAN ODT) 4 mg disintegrating tablet Take 2 Tablets by mouth every eight (8) hours as needed for Nausea or Nausea or Vomiting. Trelegy Ellipta 100-62.5-25 mcg inhaler INHALE 1 PUFF EVERY DAY. PLEASE CALL AND SCHEDULE AN APPOINTMENT WITH NEW PCP FOR FUTURE REFILLS    famotidine (PEPCID) 20 mg tablet Take 20 mg by mouth daily. albuterol (PROVENTIL HFA, VENTOLIN HFA, PROAIR HFA) 90 mcg/actuation inhaler Take 1 Puff by inhalation every four (4) hours as needed for Wheezing. Indications: asthma attack    fluticasone propionate (FLONASE) 50 mcg/actuation nasal spray USE 1 SPRAY IN EACH NOSTRIL EVERY MORNING    EPINEPHrine (EPIPEN) 0.3 mg/0.3 mL injection INJECT 0.3 ML INTRAMUSCULARLY ONCE AS NEEDED FOR ALLERGIC RESPONSE    Dexilant 60 mg CpDB capsule (delayed release) TAKE 1 CAPSULE BY MOUTH IN THE MORNING. diclofenac (VOLTAREN) 1 % gel Apply  to affected area four (4) times daily. polyethylene glycol (Miralax) 17 gram packet Take 1 Packet by mouth two (2) times a day. doxercalciferoL (HECTOROL) 4 mcg/2 mL injection 6 mcg by IntraVENous route. epoetin jalen (EPOGEN;PROCRIT) 10,000 unit/mL injection 10,000 Units by SubCUTAneous route. cinacalcet (SENSIPAR) 30 mg tablet Take 30 mg by mouth daily. hyoscyamine SL (LEVSIN/SL) 0.125 mg SL tablet 1 Tablet by SubLINGual route every four (4) hours as needed (irritable bowel). predniSONE (DELTASONE) 5 mg tablet Take 1 Tablet by mouth daily. albuterol (PROVENTIL VENTOLIN) 2.5 mg /3 mL (0.083 %) nebu USE 1 VIAL VIA NEBULIZER THREE TIMES DAILY    calcitRIOL (ROCALTROL) 0.5 mcg capsule Take 0.5 mcg by mouth See Admin Instructions. Take on non dialysis days (Tues, Wed, Thur, Sat, Sun)  Dialysis is on Monday and Friday    RenaPlex-D 800 mcg-12.5 mg -2,000 unit tab Take 1 Tablet by mouth daily. sevelamer carbonate (RENVELA) 800 mg tab tab Take 800 mg by mouth three (3) times daily. aluminum & magnesium hydroxide-simethicone (Maalox Maximum Strength) 400-400-40 mg/5 mL suspension Take 10 mL by mouth every six (6) hours as needed for Indigestion. ESTRACE 0.01 % (0.1 mg/gram) vaginal cream INSERT 2 GRAMS INTO VAGINA EVERY MONDAY AND THURSDAY    cranberry extract 425 mg cap Take 425 mg by mouth daily. No current facility-administered medications for this visit. [facility] follow up appointment(s):   No future appointments.           Goals Addressed                   This Visit's Progress     Attend follow up appointments on schedule   On track     Prepare patients and caregivers for end of life decisions (ie. need for hospice, pain management, symptom relief, advance directives etc.)   On track     Take all medications as ordered   On track

## 2023-02-10 ENCOUNTER — HOSPITAL ENCOUNTER (INPATIENT)
Age: 68
LOS: 1 days | Discharge: HOME HEALTH | DRG: 640 | End: 2023-02-14
Attending: INTERNAL MEDICINE | Admitting: INTERNAL MEDICINE
Payer: MEDICARE

## 2023-02-10 ENCOUNTER — APPOINTMENT (OUTPATIENT)
Dept: GENERAL RADIOLOGY | Age: 68
End: 2023-02-10
Attending: INTERNAL MEDICINE
Payer: MEDICARE

## 2023-02-10 ENCOUNTER — HOSPITAL ENCOUNTER (OUTPATIENT)
Age: 68
Setting detail: OBSERVATION
Discharge: STILL ADMITTED OR ADMINISTRATIVE | End: 2023-02-11
Attending: INTERNAL MEDICINE | Admitting: INTERNAL MEDICINE
Payer: MEDICARE

## 2023-02-10 DIAGNOSIS — M77.8 TENDINITIS OF SHOULDER, RIGHT: Primary | ICD-10-CM

## 2023-02-10 DIAGNOSIS — Z99.2 ESRD NEEDING DIALYSIS (HCC): Primary | ICD-10-CM

## 2023-02-10 DIAGNOSIS — N18.6 ESRD NEEDING DIALYSIS (HCC): Primary | ICD-10-CM

## 2023-02-10 LAB
ALBUMIN SERPL-MCNC: 3 G/DL (ref 3.4–5)
ALBUMIN/GLOB SERPL: 0.8 (ref 0.8–1.7)
ALP SERPL-CCNC: 167 U/L (ref 45–117)
ALT SERPL-CCNC: 17 U/L (ref 13–56)
ANION GAP SERPL CALC-SCNC: 13 MMOL/L (ref 3–18)
AST SERPL W P-5'-P-CCNC: 17 U/L (ref 10–38)
ATRIAL RATE: 76 BPM
BASOPHILS # BLD: 0.1 K/UL (ref 0–0.1)
BASOPHILS NFR BLD: 1 % (ref 0–2)
BILIRUB SERPL-MCNC: 0.6 MG/DL (ref 0.2–1)
BNP SERPL-MCNC: ABNORMAL PG/ML (ref 0–900)
BUN SERPL-MCNC: 95 MG/DL (ref 7–18)
BUN/CREAT SERPL: 8 (ref 12–20)
CA-I BLD-MCNC: 7.3 MG/DL (ref 8.5–10.1)
CALCULATED P AXIS, ECG09: 44 DEGREES
CALCULATED R AXIS, ECG10: 57 DEGREES
CALCULATED T AXIS, ECG11: 53 DEGREES
CHLORIDE SERPL-SCNC: 106 MMOL/L (ref 100–111)
CO2 SERPL-SCNC: 16 MMOL/L (ref 21–32)
CREAT SERPL-MCNC: 12.6 MG/DL (ref 0.6–1.3)
DIAGNOSIS, 93000: NORMAL
DIFFERENTIAL METHOD BLD: ABNORMAL
EOSINOPHIL # BLD: 0.2 K/UL (ref 0–0.4)
EOSINOPHIL NFR BLD: 2 % (ref 0–5)
ERYTHROCYTE [DISTWIDTH] IN BLOOD BY AUTOMATED COUNT: 17.5 % (ref 11.6–14.5)
GLOBULIN SER CALC-MCNC: 3.6 G/DL (ref 2–4)
GLUCOSE BLD STRIP.AUTO-MCNC: 62 MG/DL (ref 70–110)
GLUCOSE SERPL-MCNC: 78 MG/DL (ref 74–99)
HCT VFR BLD AUTO: 34.9 % (ref 35–45)
HGB BLD-MCNC: 11 G/DL (ref 12–16)
IMM GRANULOCYTES # BLD AUTO: 0 K/UL (ref 0–0.04)
IMM GRANULOCYTES NFR BLD AUTO: 0 % (ref 0–0.5)
LIPASE SERPL-CCNC: 157 U/L (ref 73–393)
LYMPHOCYTES # BLD: 1.5 K/UL (ref 0.9–3.6)
LYMPHOCYTES NFR BLD: 19 % (ref 21–52)
MAGNESIUM SERPL-MCNC: 2.4 MG/DL (ref 1.6–2.6)
MCH RBC QN AUTO: 29.2 PG (ref 24–34)
MCHC RBC AUTO-ENTMCNC: 31.5 G/DL (ref 31–37)
MCV RBC AUTO: 92.6 FL (ref 78–100)
MONOCYTES # BLD: 0.3 K/UL (ref 0.05–1.2)
MONOCYTES NFR BLD: 4 % (ref 3–10)
NEUTS SEG # BLD: 5.8 K/UL (ref 1.8–8)
NEUTS SEG NFR BLD: 74 % (ref 40–73)
NRBC # BLD: 0 K/UL (ref 0–0.01)
NRBC BLD-RTO: 0 PER 100 WBC
P-R INTERVAL, ECG05: 200 MS
PERFORMED BY, TECHID: ABNORMAL
PLATELET # BLD AUTO: 241 K/UL (ref 135–420)
PMV BLD AUTO: 8.7 FL (ref 9.2–11.8)
POTASSIUM SERPL-SCNC: 5.9 MMOL/L (ref 3.5–5.5)
PROT SERPL-MCNC: 6.6 G/DL (ref 6.4–8.2)
Q-T INTERVAL, ECG07: 423 MS
QRS DURATION, ECG06: 94 MS
QTC CALCULATION (BEZET), ECG08: 476 MS
RBC # BLD AUTO: 3.77 M/UL (ref 4.2–5.3)
SODIUM SERPL-SCNC: 135 MMOL/L (ref 136–145)
TROPONIN I SERPL HS-MCNC: 23 NG/L (ref 0–54)
VENTRICULAR RATE, ECG03: 76 BPM
WBC # BLD AUTO: 7.9 K/UL (ref 4.6–13.2)

## 2023-02-10 PROCEDURE — 85025 COMPLETE CBC W/AUTO DIFF WBC: CPT

## 2023-02-10 PROCEDURE — 83735 ASSAY OF MAGNESIUM: CPT

## 2023-02-10 PROCEDURE — 36415 COLL VENOUS BLD VENIPUNCTURE: CPT

## 2023-02-10 PROCEDURE — G0257 UNSCHED DIALYSIS ESRD PT HOS: HCPCS

## 2023-02-10 PROCEDURE — 96374 THER/PROPH/DIAG INJ IV PUSH: CPT

## 2023-02-10 PROCEDURE — 83690 ASSAY OF LIPASE: CPT

## 2023-02-10 PROCEDURE — 9990 CHARGE CONVERSION

## 2023-02-10 PROCEDURE — 80053 COMPREHEN METABOLIC PANEL: CPT

## 2023-02-10 PROCEDURE — 74011250636 HC RX REV CODE- 250/636: Performed by: NURSE PRACTITIONER

## 2023-02-10 PROCEDURE — 74011250636 HC RX REV CODE- 250/636: Performed by: INTERNAL MEDICINE

## 2023-02-10 PROCEDURE — 86706 HEP B SURFACE ANTIBODY: CPT

## 2023-02-10 PROCEDURE — 71045 X-RAY EXAM CHEST 1 VIEW: CPT

## 2023-02-10 PROCEDURE — G0378 HOSPITAL OBSERVATION PER HR: HCPCS

## 2023-02-10 PROCEDURE — 82962 GLUCOSE BLOOD TEST: CPT

## 2023-02-10 PROCEDURE — 87340 HEPATITIS B SURFACE AG IA: CPT

## 2023-02-10 PROCEDURE — 90935 HEMODIALYSIS ONE EVALUATION: CPT

## 2023-02-10 PROCEDURE — 93005 ELECTROCARDIOGRAM TRACING: CPT

## 2023-02-10 PROCEDURE — 84484 ASSAY OF TROPONIN QUANT: CPT

## 2023-02-10 PROCEDURE — 99285 EMERGENCY DEPT VISIT HI MDM: CPT

## 2023-02-10 PROCEDURE — 74011250637 HC RX REV CODE- 250/637: Performed by: NURSE PRACTITIONER

## 2023-02-10 PROCEDURE — 83880 ASSAY OF NATRIURETIC PEPTIDE: CPT

## 2023-02-10 PROCEDURE — 5A1D70Z PERFORMANCE OF URINARY FILTRATION, INTERMITTENT, LESS THAN 6 HOURS PER DAY: ICD-10-PCS | Performed by: NURSE PRACTITIONER

## 2023-02-10 RX ORDER — SODIUM CHLORIDE 0.9 % (FLUSH) 0.9 %
5-40 SYRINGE (ML) INJECTION EVERY 8 HOURS
Status: DISCONTINUED | OUTPATIENT
Start: 2023-02-10 | End: 2023-02-11 | Stop reason: HOSPADM

## 2023-02-10 RX ORDER — POLYETHYLENE GLYCOL 3350 17 G/17G
17 POWDER, FOR SOLUTION ORAL 2 TIMES DAILY
Status: DISCONTINUED | OUTPATIENT
Start: 2023-02-10 | End: 2023-02-11 | Stop reason: HOSPADM

## 2023-02-10 RX ORDER — POLYETHYLENE GLYCOL 3350 17 G/17G
17 POWDER, FOR SOLUTION ORAL DAILY PRN
Status: DISCONTINUED | OUTPATIENT
Start: 2023-02-10 | End: 2023-02-10 | Stop reason: SDUPTHER

## 2023-02-10 RX ORDER — MONTELUKAST SODIUM 10 MG/1
10 TABLET ORAL DAILY
Status: DISCONTINUED | OUTPATIENT
Start: 2023-02-11 | End: 2023-02-14 | Stop reason: HOSPADM

## 2023-02-10 RX ORDER — ONDANSETRON 2 MG/ML
4 INJECTION INTRAMUSCULAR; INTRAVENOUS
Status: COMPLETED | OUTPATIENT
Start: 2023-02-10 | End: 2023-02-10

## 2023-02-10 RX ORDER — SODIUM CHLORIDE 0.9 % (FLUSH) 0.9 %
5-40 SYRINGE (ML) INJECTION AS NEEDED
Status: DISCONTINUED | OUTPATIENT
Start: 2023-02-10 | End: 2023-02-10

## 2023-02-10 RX ORDER — FLUTICASONE PROPIONATE 50 MCG
2 SPRAY, SUSPENSION (ML) NASAL DAILY
Status: DISCONTINUED | OUTPATIENT
Start: 2023-02-11 | End: 2023-02-11 | Stop reason: HOSPADM

## 2023-02-10 RX ORDER — ONDANSETRON 4 MG/1
8 TABLET, ORALLY DISINTEGRATING ORAL
Status: DISCONTINUED | OUTPATIENT
Start: 2023-02-10 | End: 2023-02-11 | Stop reason: HOSPADM

## 2023-02-10 RX ORDER — AMIODARONE HYDROCHLORIDE 200 MG/1
200 TABLET ORAL DAILY
Status: DISCONTINUED | OUTPATIENT
Start: 2023-02-11 | End: 2023-02-11 | Stop reason: HOSPADM

## 2023-02-10 RX ORDER — LEVOTHYROXINE SODIUM 0.07 MG/1
75 TABLET ORAL
Status: DISCONTINUED | OUTPATIENT
Start: 2023-02-11 | End: 2023-02-14 | Stop reason: HOSPADM

## 2023-02-10 RX ORDER — CLOPIDOGREL BISULFATE 75 MG/1
75 TABLET ORAL DAILY
Status: DISCONTINUED | OUTPATIENT
Start: 2023-02-11 | End: 2023-02-14 | Stop reason: HOSPADM

## 2023-02-10 RX ORDER — SEVELAMER CARBONATE 800 MG/1
800 TABLET, FILM COATED ORAL
Status: DISCONTINUED | OUTPATIENT
Start: 2023-02-11 | End: 2023-02-14 | Stop reason: HOSPADM

## 2023-02-10 RX ORDER — CALCITRIOL 0.25 UG/1
0.5 CAPSULE, LIQUID FILLED ORAL
Status: DISCONTINUED | OUTPATIENT
Start: 2023-02-11 | End: 2023-02-14 | Stop reason: HOSPADM

## 2023-02-10 RX ORDER — ATORVASTATIN CALCIUM 10 MG/1
10 TABLET, FILM COATED ORAL NIGHTLY
Status: DISCONTINUED | OUTPATIENT
Start: 2023-02-11 | End: 2023-02-14 | Stop reason: HOSPADM

## 2023-02-10 RX ORDER — ATORVASTATIN CALCIUM 10 MG/1
10 TABLET, FILM COATED ORAL
Status: DISCONTINUED | OUTPATIENT
Start: 2023-02-10 | End: 2023-02-11 | Stop reason: HOSPADM

## 2023-02-10 RX ORDER — ONDANSETRON 2 MG/ML
4 INJECTION INTRAMUSCULAR; INTRAVENOUS
Status: DISCONTINUED | OUTPATIENT
Start: 2023-02-10 | End: 2023-02-11 | Stop reason: HOSPADM

## 2023-02-10 RX ORDER — MAGNESIUM HYDROXIDE/ALUMINUM HYDROXICE/SIMETHICONE 120; 1200; 1200 MG/30ML; MG/30ML; MG/30ML
10 SUSPENSION ORAL EVERY 6 HOURS PRN
Status: DISCONTINUED | OUTPATIENT
Start: 2023-02-11 | End: 2023-02-14 | Stop reason: HOSPADM

## 2023-02-10 RX ORDER — AMLODIPINE BESYLATE 5 MG/1
10 TABLET ORAL DAILY
Status: DISCONTINUED | OUTPATIENT
Start: 2023-02-11 | End: 2023-02-14 | Stop reason: HOSPADM

## 2023-02-10 RX ORDER — TRAZODONE HYDROCHLORIDE 50 MG/1
50 TABLET ORAL NIGHTLY
Status: DISCONTINUED | OUTPATIENT
Start: 2023-02-11 | End: 2023-02-14 | Stop reason: HOSPADM

## 2023-02-10 RX ORDER — CLOPIDOGREL BISULFATE 75 MG/1
75 TABLET ORAL DAILY
Status: DISCONTINUED | OUTPATIENT
Start: 2023-02-11 | End: 2023-02-11 | Stop reason: HOSPADM

## 2023-02-10 RX ORDER — CARVEDILOL 12.5 MG/1
25 TABLET ORAL 2 TIMES DAILY WITH MEALS
Status: DISCONTINUED | OUTPATIENT
Start: 2023-02-11 | End: 2023-02-11 | Stop reason: HOSPADM

## 2023-02-10 RX ORDER — MONTELUKAST SODIUM 10 MG/1
10 TABLET ORAL DAILY
Status: DISCONTINUED | OUTPATIENT
Start: 2023-02-11 | End: 2023-02-11 | Stop reason: HOSPADM

## 2023-02-10 RX ORDER — FAMOTIDINE 20 MG/1
10 TABLET, FILM COATED ORAL DAILY
Status: DISCONTINUED | OUTPATIENT
Start: 2023-02-11 | End: 2023-02-11 | Stop reason: HOSPADM

## 2023-02-10 RX ORDER — ALBUTEROL SULFATE 90 UG/1
1 AEROSOL, METERED RESPIRATORY (INHALATION)
Status: DISCONTINUED | OUTPATIENT
Start: 2023-02-10 | End: 2023-02-11 | Stop reason: HOSPADM

## 2023-02-10 RX ORDER — ALUMINA, MAGNESIA, AND SIMETHICONE 2400; 2400; 240 MG/30ML; MG/30ML; MG/30ML
10 SUSPENSION ORAL
Status: DISCONTINUED | OUTPATIENT
Start: 2023-02-10 | End: 2023-02-11 | Stop reason: HOSPADM

## 2023-02-10 RX ORDER — CINACALCET 30 MG/1
30 TABLET, FILM COATED ORAL DAILY
Status: DISCONTINUED | OUTPATIENT
Start: 2023-02-11 | End: 2023-02-14 | Stop reason: HOSPADM

## 2023-02-10 RX ORDER — ACETAMINOPHEN 650 MG/1
650 SUPPOSITORY RECTAL EVERY 4 HOURS PRN
Status: DISCONTINUED | OUTPATIENT
Start: 2023-02-11 | End: 2023-02-14 | Stop reason: HOSPADM

## 2023-02-10 RX ORDER — SEVELAMER CARBONATE 800 MG/1
800 TABLET, FILM COATED ORAL 3 TIMES DAILY
Status: DISCONTINUED | OUTPATIENT
Start: 2023-02-10 | End: 2023-02-11 | Stop reason: HOSPADM

## 2023-02-10 RX ORDER — ACETAMINOPHEN 325 MG/1
650 TABLET ORAL EVERY 4 HOURS PRN
Status: DISCONTINUED | OUTPATIENT
Start: 2023-02-11 | End: 2023-02-14 | Stop reason: HOSPADM

## 2023-02-10 RX ORDER — MECLIZINE HCL 12.5 MG 12.5 MG/1
12.5 TABLET ORAL
Status: DISCONTINUED | OUTPATIENT
Start: 2023-02-10 | End: 2023-02-11 | Stop reason: HOSPADM

## 2023-02-10 RX ORDER — AMLODIPINE BESYLATE 5 MG/1
10 TABLET ORAL DAILY
Status: DISCONTINUED | OUTPATIENT
Start: 2023-02-11 | End: 2023-02-11 | Stop reason: HOSPADM

## 2023-02-10 RX ORDER — AMIODARONE HYDROCHLORIDE 200 MG/1
200 TABLET ORAL DAILY
Status: DISCONTINUED | OUTPATIENT
Start: 2023-02-11 | End: 2023-02-14 | Stop reason: HOSPADM

## 2023-02-10 RX ORDER — LOSARTAN POTASSIUM 25 MG/1
50 TABLET ORAL DAILY
Status: DISCONTINUED | OUTPATIENT
Start: 2023-02-11 | End: 2023-02-11 | Stop reason: HOSPADM

## 2023-02-10 RX ORDER — ONDANSETRON 4 MG/1
8 TABLET, ORALLY DISINTEGRATING ORAL EVERY 8 HOURS PRN
Status: DISCONTINUED | OUTPATIENT
Start: 2023-02-11 | End: 2023-02-14 | Stop reason: HOSPADM

## 2023-02-10 RX ORDER — POLYETHYLENE GLYCOL 3350 17 G/17G
17 POWDER, FOR SOLUTION ORAL 2 TIMES DAILY
Status: DISCONTINUED | OUTPATIENT
Start: 2023-02-11 | End: 2023-02-12

## 2023-02-10 RX ORDER — CALCITRIOL 0.25 UG/1
0.25 CAPSULE, LIQUID FILLED ORAL
Status: DISCONTINUED | OUTPATIENT
Start: 2023-02-11 | End: 2023-02-10

## 2023-02-10 RX ORDER — ACETAMINOPHEN 650 MG/1
650 SUPPOSITORY RECTAL
Status: DISCONTINUED | OUTPATIENT
Start: 2023-02-10 | End: 2023-02-11 | Stop reason: HOSPADM

## 2023-02-10 RX ORDER — FLUTICASONE PROPIONATE 50 MCG
2 SPRAY, SUSPENSION (ML) NASAL DAILY
Status: DISCONTINUED | OUTPATIENT
Start: 2023-02-11 | End: 2023-02-14 | Stop reason: HOSPADM

## 2023-02-10 RX ORDER — FAMOTIDINE 20 MG/1
10 TABLET, FILM COATED ORAL DAILY
Status: DISCONTINUED | OUTPATIENT
Start: 2023-02-11 | End: 2023-02-14 | Stop reason: HOSPADM

## 2023-02-10 RX ORDER — CINACALCET 30 MG/1
30 TABLET, FILM COATED ORAL DAILY
Status: DISCONTINUED | OUTPATIENT
Start: 2023-02-11 | End: 2023-02-11 | Stop reason: HOSPADM

## 2023-02-10 RX ORDER — HYOSCYAMINE SULFATE 0.125 MG
125 TABLET,DISINTEGRATING ORAL EVERY 4 HOURS PRN
Status: DISCONTINUED | OUTPATIENT
Start: 2023-02-11 | End: 2023-02-14 | Stop reason: HOSPADM

## 2023-02-10 RX ORDER — MECLIZINE HCL 12.5 MG/1
12.5 TABLET ORAL EVERY 6 HOURS PRN
Status: DISCONTINUED | OUTPATIENT
Start: 2023-02-11 | End: 2023-02-14 | Stop reason: HOSPADM

## 2023-02-10 RX ORDER — LEVOTHYROXINE SODIUM 75 UG/1
75 TABLET ORAL
Status: DISCONTINUED | OUTPATIENT
Start: 2023-02-11 | End: 2023-02-11 | Stop reason: HOSPADM

## 2023-02-10 RX ORDER — CARVEDILOL 12.5 MG/1
25 TABLET ORAL 2 TIMES DAILY WITH MEALS
Status: DISCONTINUED | OUTPATIENT
Start: 2023-02-11 | End: 2023-02-14 | Stop reason: HOSPADM

## 2023-02-10 RX ORDER — LOSARTAN POTASSIUM 25 MG/1
50 TABLET ORAL DAILY
Status: DISCONTINUED | OUTPATIENT
Start: 2023-02-11 | End: 2023-02-14 | Stop reason: HOSPADM

## 2023-02-10 RX ORDER — CALCITRIOL 0.25 UG/1
0.5 CAPSULE ORAL
Status: DISCONTINUED | OUTPATIENT
Start: 2023-02-11 | End: 2023-02-11 | Stop reason: HOSPADM

## 2023-02-10 RX ORDER — HYOSCYAMINE SULFATE 0.12 MG/1
0.12 TABLET, ORALLY DISINTEGRATING ORAL
Status: DISCONTINUED | OUTPATIENT
Start: 2023-02-10 | End: 2023-02-11 | Stop reason: HOSPADM

## 2023-02-10 RX ORDER — ONDANSETRON 4 MG/1
4 TABLET, ORALLY DISINTEGRATING ORAL
Status: DISCONTINUED | OUTPATIENT
Start: 2023-02-10 | End: 2023-02-10 | Stop reason: SDUPTHER

## 2023-02-10 RX ORDER — SODIUM CHLORIDE 0.9 % (FLUSH) 0.9 %
5-40 SYRINGE (ML) INJECTION EVERY 8 HOURS
Status: DISCONTINUED | OUTPATIENT
Start: 2023-02-11 | End: 2023-02-14 | Stop reason: HOSPADM

## 2023-02-10 RX ORDER — ACETAMINOPHEN 325 MG/1
650 TABLET ORAL
Status: DISCONTINUED | OUTPATIENT
Start: 2023-02-10 | End: 2023-02-11 | Stop reason: HOSPADM

## 2023-02-10 RX ORDER — TRAZODONE HYDROCHLORIDE 50 MG/1
50 TABLET ORAL
Status: DISCONTINUED | OUTPATIENT
Start: 2023-02-10 | End: 2023-02-11 | Stop reason: HOSPADM

## 2023-02-10 RX ORDER — ALBUTEROL SULFATE 90 UG/1
1 AEROSOL, METERED RESPIRATORY (INHALATION) EVERY 4 HOURS PRN
Status: DISCONTINUED | OUTPATIENT
Start: 2023-02-11 | End: 2023-02-14 | Stop reason: HOSPADM

## 2023-02-10 RX ADMIN — TRAZODONE HYDROCHLORIDE 50 MG: 50 TABLET ORAL at 22:42

## 2023-02-10 RX ADMIN — ONDANSETRON 4 MG: 2 INJECTION INTRAMUSCULAR; INTRAVENOUS at 21:03

## 2023-02-10 RX ADMIN — ONDANSETRON 4 MG: 2 INJECTION INTRAMUSCULAR; INTRAVENOUS at 16:09

## 2023-02-10 NOTE — PROGRESS NOTES
1650-NEW ADMIT FROM ER, PER PATIENT- SHE HAS BEEN UNABLE TO AMBULATE SINCE NOT FEELING WELL, PATIENT IS A/0 X 4, LUNGS SOUNDS DIM, BS ACTIVE, IV PATENT TO LEJ, SKIN INTACT, PATIENT HAS ONE EPISODE OF URINE INCONTINENT WHILE BROUGHT UP TO UNIT- PER CARE PERFORMED, PATIENT  CHANGED INTO GOWN; LINEN CHANGED, PO FLUIDS GIVEN, PATIENT REFUSED DINNER, CALL LIGHT IN REACH.    1825-CALL RECEIVED FROM DAUGHTER; UPDATE GIVEN, PATIENT NOTED TO BE SLEEPING

## 2023-02-10 NOTE — PROGRESS NOTES
Problem: Falls - Risk of  Goal: *Absence of Falls  Description: Document [place] Fall Risk and appropriate interventions in the flowsheet.   Outcome: Progressing Towards Goal  Note: Fall Risk Interventions:

## 2023-02-10 NOTE — ED PROVIDER NOTES
[facility] EMERGENCY DEPT  EMERGENCY DEPARTMENT HISTORY AND PHYSICAL EXAM      Date: 2/10/2023  Patient Name: Sofia Clifford  MRN: [id number]  [place]: 1955  Date of evaluation: 2/10/2023  Provider: Jaswant Puente MD   Note Started: 10:42 AM 2/10/23    HISTORY OF PRESENT ILLNESS     Chief Complaint   Patient presents with    Diarrhea     And dry heaves       History Provided By: Patient    HPI: Sofia Clifford, 74 y.o. female with history of end-stage renal disease on Monday and Friday dialysis- [facility] [place], Atrial Flutter; COPD, +CMV Ab; cholecystectomy, hypertension, hyperlipidemia, hypothyroid on synthroid, failed kidney transplant LD' 02 that states that she did not go to her dialysis on Monday. She states that on Wednesday she went and received her shingles booster as well as her second COVID booster and that this has made her sick. She states that she now has dry heaves and diarrhea and she feels weak. She was supposed to go to dialysis today but did not. She denies any fever chills, chest pain, shortness of breath, abdominal pain.     PAST MEDICAL HISTORY   Past Medical History:  Past Medical History:   Diagnosis Date    JEFF (acute kidney injury) ([facility] [address] 05/09/2019    Anemia NEC     Anxiety     Arrhythmia     Arthritis     Asthma     Asthma     Burning with urination     frequent uti    Calculus of gallbladder with acute cholecystitis without obstruction 10/09/2020    Cholecystitis 01/12/2019    Chronic kidney disease     dialysis    CMV (cytomegalovirus) antibody positive     Colitis 09/10/2022    COPD (chronic obstructive pulmonary disease) ([facility] [address]     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    Dialysis patient [facility])     M/W/F    Diverticular disease of colon 08/21/2013    Dyspepsia and other specified disorders of function of stomach     stomach ulcer    Elevated troponin 10/21/2019    Encounter for cholecystectomy 05/06/2021 7/2020    Essential hypertension     GERD (gastroesophageal reflux disease)     History of chemotherapy     History of renal transplant 08/21/2013    (LD 2/12/2002)    HLD (hyperlipidemia)     Hypercholesteremia     Hypertension     Hypothyroidism 03/23/2022    Kidney transplant rejection     Menopause     Migraine     Obesity     Osteoporosis     Pancreatitis 08/28/2022    Pyelonephritis 07/13/2020    Recurrent urinary tract infection 09/27/2016    Renal cyst 2002    kidney transplant     Spontaneous bacterial peritonitis ([facility] [address] 01/16/2019    Ulcer        Past Surgical History:  Past Surgical History:   Procedure Laterality Date    COLONOSCOPY      Dr Ish Lewis  5yrs ago    ENDOSCOPY VISIT-OUTPATIENT      Dr Ish Lewis  5 yrs ago    ENDOSCOPY, COLON, DIAGNOSTIC      with Dr. Anahi Espitia CHOLECYSTECTOMY  02/2021    HX COLONOSCOPY  05/13/2022    HX GI      ulcer    HX HEENT      sinus surg    HX OTHER SURGICAL  02/21/2022    [facility];  Surgeon: Carlton Mae MD    HX RENAL TRANSPLANT      HX TRANSPLANT      kidney transplant 2002    MS UNLISTED PROCEDURE VASCULAR SURGERY      shunt in prep for dialysis       Family History:  Family History   Problem Relation Age of Onset    Diabetes Mother     Cervical Cancer Mother     Arthritis-rheumatoid Mother     Hypertension Father     Diabetes Father     Thyroid Disease Sister     Colon Polyps Brother        Social History:  Social History     Tobacco Use    Smoking status: Never    Smokeless tobacco: Never   Vaping Use    Vaping Use: Never used   Substance Use Topics    Alcohol use: No    Drug use: No       Allergies:   Allergies   Allergen Reactions    Aspirin Rash and Unknown (comments)    Bactrim [Sulfamethoxazole-Trimethoprim] Rash and Unknown (comments)    Bromfenac Rash and Unknown (comments)    Cefepime Other (comments)     Neurological reaction    Ceftriaxone Rash    Copper Rash    Ibuprofen Rash and Unknown (comments)    Ketorolac Tromethamine Rash and Unknown (comments)    Relafen [Nabumetone] Rash and Unknown (comments)    Rifampin Rash and Unknown (comments)    Vancomycin Rash and Unknown (comments)     Pt reports causes itching, takes benadryl prior to use. Pt denies anaphylaxis. Requires benadryl with each vancomycin dose       PCP: Tiffany Buckley MD    Current Meds:   Previous Medications    ALBUTEROL (PROVENTIL HFA, VENTOLIN HFA, PROAIR HFA) 90 MCG/ACTUATION INHALER    Take 1 Puff by inhalation every four (4) hours as needed for Wheezing. Indications: asthma attack    ALBUTEROL (PROVENTIL VENTOLIN) 2.5 MG /3 ML (0.083 %) NEBU    USE 1 VIAL VIA NEBULIZER THREE TIMES DAILY    ALUMINUM & MAGNESIUM HYDROXIDE-SIMETHICONE (MAALOX MAXIMUM STRENGTH) 400-400-40 MG/5 ML SUSPENSION    Take 10 mL by mouth every six (6) hours as needed for Indigestion. AMIODARONE (CORDARONE) 200 MG TABLET    TAKE 1 TABLET EVERY MORNING    AMLODIPINE (NORVASC) 10 MG TABLET    TAKE 1 TABLET EVERY DAY    CALCITRIOL (ROCALTROL) 0.5 MCG CAPSULE    Take 0.5 mcg by mouth See Admin Instructions. Take on non dialysis days (Tues, Wed, Thur, Sat, Sun)  Dialysis is on Monday and Friday    CARVEDILOL (COREG) 25 MG TABLET    TAKE 1 TABLET TWICE DAILY WITH MEALS    CINACALCET (SENSIPAR) 30 MG TABLET    Take 30 mg by mouth daily. CLOPIDOGREL (PLAVIX) 75 MG TAB    TAKE 1 TABLET EVERY MORNING    CRANBERRY EXTRACT 425 MG CAP    Take 425 mg by mouth daily. DEXILANT 60 MG CPDB CAPSULE (DELAYED RELEASE)    TAKE 1 CAPSULE BY MOUTH IN THE MORNING. DICLOFENAC (VOLTAREN) 1 % GEL    Apply  to affected area four (4) times daily. DICYCLOMINE (BENTYL) 10 MG CAPSULE    TAKE 1 CAPSULE EVERY MORNING    DOXERCALCIFEROL (HECTOROL) 4 MCG/2 ML INJECTION    6 mcg by IntraVENous route. ELIQUIS 2.5 MG TABLET    TAKE 1 TABLET TWICE DAILY    EPINEPHRINE (EPIPEN) 0.3 MG/0.3 ML INJECTION    INJECT 0.3 ML INTRAMUSCULARLY ONCE AS NEEDED FOR ALLERGIC RESPONSE    EPOETIN KIMBER (EPOGEN;PROCRIT) 10,000 UNIT/ML INJECTION    10,000 Units by SubCUTAneous route.     ESTRACE 0.01 % (0.1 MG/GRAM) VAGINAL CREAM    INSERT 2 GRAMS INTO VAGINA EVERY MONDAY AND THURSDAY    FAMOTIDINE (PEPCID) 20 MG TABLET    Take 20 mg by mouth daily. FLUTICASONE PROPIONATE (FLONASE) 50 MCG/ACTUATION NASAL SPRAY    USE 1 SPRAY IN EACH NOSTRIL EVERY MORNING    HYOSCYAMINE SL (LEVSIN/SL) 0.125 MG SL TABLET    1 Tablet by SubLINGual route every four (4) hours as needed (irritable bowel). LEVOTHYROXINE (SYNTHROID) 75 MCG TABLET    TAKE 1 TABLET EVERY DAY BEFORE BREAKFAST    LOSARTAN (COZAAR) 50 MG TABLET    TAKE 1 TABLET EVERY MORNING    MECLIZINE (ANTIVERT) 25 MG TABLET    TAKE 1 TABLET THREE TIMES DAILY AS NEEDED FOR DIZZINESS    MONTELUKAST (SINGULAIR) 10 MG TABLET    TAKE 1 TABLET EVERY MORNING    ONDANSETRON (ZOFRAN ODT) 4 MG DISINTEGRATING TABLET    Take 2 Tablets by mouth every eight (8) hours as needed for Nausea or Nausea or Vomiting. POLYETHYLENE GLYCOL (MIRALAX) 17 GRAM PACKET    Take 1 Packet by mouth two (2) times a day. PREDNISONE (DELTASONE) 5 MG TABLET    Take 1 Tablet by mouth daily. RENAPLEX-D 800 MCG-12.5 MG -2,000 UNIT TAB    Take 1 Tablet by mouth daily. SEVELAMER CARBONATE (RENVELA) 800 MG TAB TAB    Take 800 mg by mouth three (3) times daily. SIMVASTATIN (ZOCOR) 20 MG TABLET    TAKE 1 TABLET EVERY DAY AS DIRECTED    SUCRALFATE (CARAFATE) 1 GRAM TABLET    TAKE 1 TABLET FOUR TIMES DAILY    TRAZODONE (DESYREL) 50 MG TABLET    TAKE 1 TABLET BY MOUTH EVERY NIGHT FOR INSOMNIA ASSOCIATED WITH DEPRESSION    TRELEGY ELLIPTA 100-62.5-25 MCG INHALER    INHALE 1 PUFF EVERY DAY. PLEASE CALL AND SCHEDULE AN APPOINTMENT WITH NEW PCP FOR FUTURE REFILLS    VALGANCICLOVIR (VALCYTE) 450 MG TABLET    TAKE 2 TABLETS EVERY MORNING       REVIEW OF SYSTEMS   Review of Systems   Constitutional:  Positive for fatigue. Negative for chills and fever. HENT:  Negative for trouble swallowing. Eyes:  Negative for redness. Respiratory:  Negative for cough, shortness of breath and wheezing.     Cardiovascular:  Negative for chest pain and palpitations. Gastrointestinal:  Positive for diarrhea and nausea. Negative for abdominal pain, constipation and vomiting. Musculoskeletal:  Negative for arthralgias and myalgias. Skin:  Negative for rash. Neurological:  Negative for headaches. Psychiatric/Behavioral:  Negative for confusion. Positives and Pertinent negatives as per HPI. PHYSICAL EXAM     ED Triage Vitals   ED Encounter Vitals Group      BP 02/10/23 1026 (!) 162/75      Pulse (Heart Rate) 02/10/23 1026 82      Resp Rate 02/10/23 1026 16      Temp 02/10/23 1026 97.7 °F (36.5 °C)      Temp src --       O2 Sat (%) 02/10/23 1026 97 %      Weight 02/10/23 1030 144 lb      Height 02/10/23 1030 5' 1\"      Physical Exam  Vitals and nursing note reviewed. Constitutional:       General: She is not in acute distress. Appearance: She is well-developed. Comments: Chronically ill appearing female in NAD. Looks weak; in bed with knees flexed and head down but speaking normally   HENT:      Head: Normocephalic. Eyes:      Extraocular Movements: Extraocular movements intact. Conjunctiva/sclera: Conjunctivae normal.   Neck:      Thyroid: No thyromegaly. Vascular: No JVD. Cardiovascular:      Rate and Rhythm: Normal rate and regular rhythm. Heart sounds: No murmur heard. No gallop. Pulmonary:      Effort: Pulmonary effort is normal. No respiratory distress. Breath sounds: Rhonchi present. Abdominal:      General: Bowel sounds are normal. There is no distension. Palpations: Abdomen is soft. Tenderness: There is no abdominal tenderness. Musculoskeletal:         General: Normal range of motion. Cervical back: Normal range of motion. Right lower leg: No edema. Left lower leg: No edema. Skin:     General: Skin is warm. Findings: No erythema.       Comments: Fistula left biceps w thrill   Neurological:      Mental Status: She is alert and oriented to person, place, and time.   Psychiatric:         Behavior: Behavior normal.       SCREENINGS               No data recorded      LAB, EKG AND DIAGNOSTIC RESULTS   Labs:  Recent Results (from the past 12 hour(s))   EKG, 12 LEAD, INITIAL    Collection Time: 02/10/23 10:47 AM   Result Value Ref Range    Ventricular Rate 76 BPM    Atrial Rate 76 BPM    P-R Interval 200 ms    QRS Duration 94 ms    Q-T Interval 423 ms    QTC Calculation (Bezet) 476 ms    Calculated P Axis 44 degrees    Calculated R Axis 57 degrees    Calculated T Axis 53 degrees    Diagnosis       Sinus rhythm  WNL    Confirmed by JAVIER Serna ([id number]) on 2/10/2023 1:14:06 PM     CBC WITH AUTOMATED DIFF    Collection Time: 02/10/23 11:55 AM   Result Value Ref Range    WBC 7.9 4.6 - 13.2 K/uL    RBC 3.77 (L) 4.20 - 5.30 M/uL    HGB 11.0 (L) 12.0 - 16.0 g/dL    HCT 34.9 (L) 35.0 - 45.0 %    MCV 92.6 78.0 - 100.0 FL    MCH 29.2 24.0 - 34.0 PG    MCHC 31.5 31.0 - 37.0 g/dL    RDW 17.5 (H) 11.6 - 14.5 %    PLATELET 791 032 - 801 K/uL    MPV 8.7 (L) 9.2 - 11.8 FL    NRBC 0.0 0.0  WBC    ABSOLUTE NRBC 0.00 0.00 - 0.01 K/uL    NEUTROPHILS 74 (H) 40 - 73 %    LYMPHOCYTES 19 (L) 21 - 52 %    MONOCYTES 4 3 - 10 %    EOSINOPHILS 2 0 - 5 %    BASOPHILS 1 0 - 2 %    IMMATURE GRANULOCYTES 0 0 - 0.5 %    ABS. NEUTROPHILS 5.8 1.8 - 8.0 K/UL    ABS. LYMPHOCYTES 1.5 0.9 - 3.6 K/UL    ABS. MONOCYTES 0.3 0.05 - 1.2 K/UL    ABS. EOSINOPHILS 0.2 0.0 - 0.4 K/UL    ABS. BASOPHILS 0.1 0.0 - 0.1 K/UL    ABS. IMM.  GRANS. 0.0 0.00 - 0.04 K/UL    DF AUTOMATED     METABOLIC PANEL, COMPREHENSIVE    Collection Time: 02/10/23 11:55 AM   Result Value Ref Range    Sodium 135 (L) 136 - 145 mmol/L    Potassium 5.9 (H) 3.5 - 5.5 mmol/L    Chloride 106 100 - 111 mmol/L    CO2 16 (L) 21 - 32 mmol/L    Anion gap 13 3.0 - 18.0 mmol/L    Glucose 78 74 - 99 mg/dL    BUN 95 (H) 7 - 18 mg/dL    Creatinine 12.60 (H) 0.60 - 1.30 mg/dL    BUN/Creatinine ratio 8 (L) 12 - 20      eGFR 3 (L) >60 ml/min/1.73m2    Calcium 7.3 (L) 8.5 - 10.1 mg/dL    Bilirubin, total 0.6 0.2 - 1.0 mg/dL    AST (SGOT) 17 10 - 38 U/L    ALT (SGPT) 17 13 - 56 U/L    Alk. phosphatase 167 (H) 45 - 117 U/L    Protein, total 6.6 6.4 - 8.2 g/dL    Albumin 3.0 (L) 3.4 - 5.0 g/dL    Globulin 3.6 2.0 - 4.0 g/dL    A-G Ratio 0.8 0.8 - 1.7     MAGNESIUM    Collection Time: 02/10/23 11:55 AM   Result Value Ref Range    Magnesium 2.4 1.6 - 2.6 mg/dL   TROPONIN-HIGH SENSITIVITY    Collection Time: 02/10/23 11:55 AM   Result Value Ref Range    Troponin-High Sensitivity 23 0 - 54 ng/L   NT-PRO BNP    Collection Time: 02/10/23 11:55 AM   Result Value Ref Range    NT pro-BNP 17,685 (H) 0 - 900 pg/mL   LIPASE    Collection Time: 02/10/23 11:55 AM   Result Value Ref Range    Lipase 157 73 - 393 U/L       EKG: Initial EKG interpreted by me. [id number] sinus rhythm 76/min. Normal NV, normal QRS, normal axis, normal QTc. No acute ST segment changes. Radiologic Studies:  Non-plain film images such as CT, Ultrasound and MRI are read by the radiologist. Plain radiographic images are visualized and preliminarily interpreted by the ED Provider with the below findings:    75 yo ESRD that has not been dialyzed for 2 sessions and states that since she has received the shingles and 2nd covid vaccines on Teusday, she has had retching; diarrhea and weakness. R/o electrolyte abnormality from not being dialyzed or having diarrhea. No SIRs to imply infection; no chest or abd pain. 11:45 AM  Bloods have not been done yet  1:48 PM  K: 5.9; CO2: 16; bun/crea: 95/12; cxr pulmonary edema. Case discussed with Dr Vick Benson    2:02 PM  Case discussed with Dr Vick Benson; states to contact nephrology. Called Dr NEFF/[employer] office; he has retired and no one there. They will page the doctor on call.   [phone]    2:43 PM  Case discussed with nephrology; dr Delaney Hay;  on call [phone] who stated that  he will try to contact dialysis nurse with orders    3:49 PM  External jugular IV placed; no IVs capable with US    Interpretation per the Radiologist below, if available at the time of this note:  XR CHEST PORT    Result Date: 2/10/2023  INDICATION: ESRD. Portable AP view of the chest. Direct comparison made to prior chest x-ray dated 11/21/2022. Cardiomediastinal silhouette is stable. There is pulmonary vascular prominence and interstitial edema. No pleural fluid is seen. There is suboptimal retroaortic opacification. Moderate pulmonary vascular congestive changes. PROCEDURES   Unless otherwise noted below, none. Performed by: Brett Castillo MD   Procedures      CRITICAL CARE TIME     ED COURSE and DIFFERENTIAL DIAGNOSIS/MDM   Vitals:    Vitals:    02/10/23 1130 02/10/23 1230 02/10/23 1330 02/10/23 1430   BP: (!) 155/89 (!) 157/85 (!) 165/77 (!) 164/71   Pulse: 85 87 88 85   Resp: 18 18 16 16   Temp:       SpO2: 97% 97% 98% 98%   Weight:       Height:            FINAL IMPRESSION     1. ESRD needing dialysis [facility])          DISPOSITION/PLAN   Admitted    Admit Note: Pt is being admitted by [facility]. The results of their tests and reason(s) for their admission have been discussed with pt and/or available family. They convey agreement and understanding for the need to be admitted and for the admission diagnosis. PATIENT REFERRED TO:  Follow-up Information    None           DISCHARGE MEDICATIONS:  Current Discharge Medication List            DISCONTINUED MEDICATIONS:  Current Discharge Medication List          I am the Primary Clinician of Record: Brett Castillo MD (electronically signed)    (Please note that parts of this dictation were completed with voice recognition software. Quite often unanticipated grammatical, syntax, homophones, and other interpretive errors are inadvertently transcribed by the computer software. Please disregards these errors.  Please excuse any errors that have escaped final proofreading.)

## 2023-02-11 LAB
ALBUMIN SERPL-MCNC: 3.1 G/DL (ref 3.4–5)
ALBUMIN/GLOB SERPL: 0.8 (ref 0.8–1.7)
ALP SERPL-CCNC: 225 U/L (ref 45–117)
ALT SERPL-CCNC: 28 U/L (ref 13–56)
ANION GAP SERPL CALC-SCNC: 16 MMOL/L (ref 3–18)
AST SERPL W P-5'-P-CCNC: 30 U/L (ref 10–38)
BILIRUB SERPL-MCNC: 0.9 MG/DL (ref 0.2–1)
BUN SERPL-MCNC: 36 MG/DL (ref 7–18)
BUN/CREAT SERPL: 5 (ref 12–20)
CA-I BLD-MCNC: 8.5 MG/DL (ref 8.5–10.1)
CHLORIDE SERPL-SCNC: 99 MMOL/L (ref 100–111)
CO2 SERPL-SCNC: 22 MMOL/L (ref 21–32)
CREAT SERPL-MCNC: 6.81 MG/DL (ref 0.6–1.3)
ERYTHROCYTE [DISTWIDTH] IN BLOOD BY AUTOMATED COUNT: 17.5 % (ref 11.6–14.5)
GLOBULIN SER CALC-MCNC: 3.8 G/DL (ref 2–4)
GLUCOSE SERPL-MCNC: 67 MG/DL (ref 74–99)
HBV SURFACE AB SER QL: NONREACTIVE
HBV SURFACE AB SER-ACNC: <3.1 MIU/ML
HBV SURFACE AG SER QL: <0.1 INDEX
HBV SURFACE AG SER QL: NEGATIVE
HCT VFR BLD AUTO: 36.8 % (ref 35–45)
HGB BLD-MCNC: 12 G/DL (ref 12–16)
MCH RBC QN AUTO: 30.4 PG (ref 24–34)
MCHC RBC AUTO-ENTMCNC: 32.6 G/DL (ref 31–37)
MCV RBC AUTO: 93.2 FL (ref 78–100)
NRBC # BLD: 0 K/UL (ref 0–0.01)
NRBC BLD-RTO: 0 PER 100 WBC
PLATELET # BLD AUTO: 250 K/UL (ref 135–420)
PMV BLD AUTO: 8.9 FL (ref 9.2–11.8)
POTASSIUM SERPL-SCNC: 4.3 MMOL/L (ref 3.5–5.5)
PROT SERPL-MCNC: 6.9 G/DL (ref 6.4–8.2)
RBC # BLD AUTO: 3.95 M/UL (ref 4.2–5.3)
SODIUM SERPL-SCNC: 137 MMOL/L (ref 136–145)
WBC # BLD AUTO: 7.3 K/UL (ref 4.6–13.2)

## 2023-02-11 PROCEDURE — 96376 TX/PRO/DX INJ SAME DRUG ADON: CPT

## 2023-02-11 PROCEDURE — 94761 N-INVAS EAR/PLS OXIMETRY MLT: CPT

## 2023-02-11 PROCEDURE — G0378 HOSPITAL OBSERVATION PER HR: HCPCS

## 2023-02-11 PROCEDURE — 2580000003 HC RX 258: Performed by: INTERNAL MEDICINE

## 2023-02-11 PROCEDURE — 9990 CHARGE CONVERSION

## 2023-02-11 PROCEDURE — 80053 COMPREHEN METABOLIC PANEL: CPT

## 2023-02-11 PROCEDURE — 6370000000 HC RX 637 (ALT 250 FOR IP): Performed by: INTERNAL MEDICINE

## 2023-02-11 PROCEDURE — 85027 COMPLETE CBC AUTOMATED: CPT

## 2023-02-11 PROCEDURE — 36415 COLL VENOUS BLD VENIPUNCTURE: CPT

## 2023-02-11 PROCEDURE — 94640 AIRWAY INHALATION TREATMENT: CPT

## 2023-02-11 PROCEDURE — 6360000002 HC RX W HCPCS: Performed by: INTERNAL MEDICINE

## 2023-02-11 PROCEDURE — 96374 THER/PROPH/DIAG INJ IV PUSH: CPT

## 2023-02-11 RX ORDER — ONDANSETRON 2 MG/ML
4 INJECTION INTRAMUSCULAR; INTRAVENOUS EVERY 8 HOURS PRN
Status: DISCONTINUED | OUTPATIENT
Start: 2023-02-11 | End: 2023-02-14 | Stop reason: HOSPADM

## 2023-02-11 RX ORDER — OXYCODONE HYDROCHLORIDE AND ACETAMINOPHEN 5; 325 MG/1; MG/1
1 TABLET ORAL EVERY 6 HOURS PRN
Status: DISCONTINUED | OUTPATIENT
Start: 2023-02-11 | End: 2023-02-14 | Stop reason: HOSPADM

## 2023-02-11 RX ADMIN — MOMETASONE FUROATE AND FORMOTEROL FUMARATE DIHYDRATE 2 PUFF: 200; 5 AEROSOL RESPIRATORY (INHALATION) at 19:07

## 2023-02-11 RX ADMIN — CARVEDILOL 25 MG: 12.5 TABLET, FILM COATED ORAL at 17:51

## 2023-02-11 RX ADMIN — ACETAMINOPHEN 650 MG: 325 TABLET ORAL at 17:51

## 2023-02-11 RX ADMIN — LEVOTHYROXINE SODIUM 75 MCG: 0.07 TABLET ORAL at 06:41

## 2023-02-11 RX ADMIN — TRAZODONE HYDROCHLORIDE 50 MG: 50 TABLET ORAL at 21:18

## 2023-02-11 RX ADMIN — ONDANSETRON 8 MG: 4 TABLET, ORALLY DISINTEGRATING ORAL at 22:25

## 2023-02-11 RX ADMIN — SODIUM CHLORIDE, PRESERVATIVE FREE 10 ML: 5 INJECTION INTRAVENOUS at 16:59

## 2023-02-11 RX ADMIN — TIOTROPIUM BROMIDE INHALATION SPRAY 2 PUFF: 3.12 SPRAY, METERED RESPIRATORY (INHALATION) at 07:38

## 2023-02-11 RX ADMIN — SODIUM CHLORIDE, PRESERVATIVE FREE 10 ML: 5 INJECTION INTRAVENOUS at 12:12

## 2023-02-11 RX ADMIN — MOMETASONE FUROATE AND FORMOTEROL FUMARATE DIHYDRATE 2 PUFF: 200; 5 AEROSOL RESPIRATORY (INHALATION) at 07:38

## 2023-02-11 RX ADMIN — APIXABAN 2.5 MG: 2.5 TABLET, FILM COATED ORAL at 21:18

## 2023-02-11 RX ADMIN — ONDANSETRON 4 MG: 2 INJECTION INTRAMUSCULAR; INTRAVENOUS at 09:11

## 2023-02-11 RX ADMIN — OXYCODONE AND ACETAMINOPHEN 1 TABLET: 5; 325 TABLET ORAL at 22:25

## 2023-02-11 RX ADMIN — SEVELAMER CARBONATE 800 MG: 800 TABLET, FILM COATED ORAL at 17:51

## 2023-02-11 RX ADMIN — ACETAMINOPHEN 650 MG: 325 TABLET ORAL at 12:02

## 2023-02-11 RX ADMIN — ONDANSETRON 4 MG: 2 INJECTION INTRAMUSCULAR; INTRAVENOUS at 16:59

## 2023-02-11 RX ADMIN — ATORVASTATIN CALCIUM 10 MG: 10 TABLET, FILM COATED ORAL at 21:18

## 2023-02-11 RX ADMIN — OXYCODONE AND ACETAMINOPHEN 1 TABLET: 5; 325 TABLET ORAL at 15:49

## 2023-02-11 ASSESSMENT — PAIN DESCRIPTION - LOCATION
LOCATION: SHOULDER;ABDOMEN
LOCATION: ARM
LOCATION: ARM

## 2023-02-11 ASSESSMENT — PAIN DESCRIPTION - ORIENTATION
ORIENTATION: RIGHT

## 2023-02-11 ASSESSMENT — PAIN SCALES - GENERAL
PAINLEVEL_OUTOF10: 5
PAINLEVEL_OUTOF10: 10
PAINLEVEL_OUTOF10: 10

## 2023-02-11 ASSESSMENT — PAIN DESCRIPTION - DESCRIPTORS
DESCRIPTORS: ACHING
DESCRIPTORS: ACHING

## 2023-02-11 NOTE — H&P
History and Physical    Patient: Lolly Tellez MRN: [id number]  SSN: [id number]    YOB: 1955  Age: 74 y.o. Sex: female      Subjective:      Lolly Tellez is a 74 y.o. female who comes to the hospital with complaints of general malaise, reported had missed HD for 2 days due to diarrhea and feeling unwell since Monday after receiving the first dose of shingles vaccine. She started having diarrhea and nausea, with left arm pain from the injection. She reported poor appetite and could not get out of bed very well to ambulate. She will be admitted for emergent hemodialysis and further evaluation and treatment.      Past Medical History:   Diagnosis Date    JEFF (acute kidney injury) ([facility] 05/09/2019    Anemia NEC     Anxiety     Arrhythmia     Arthritis     Asthma     Asthma     Burning with urination     frequent uti    Calculus of gallbladder with acute cholecystitis without obstruction 10/09/2020    Cholecystitis 01/12/2019    Chronic kidney disease     dialysis    CMV (cytomegalovirus) antibody positive     Colitis 09/10/2022    COPD (chronic obstructive pulmonary disease) ([facility]     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    Dialysis patient [facility])     M/W/F    Diverticular disease of colon 08/21/2013    Dyspepsia and other specified disorders of function of stomach     stomach ulcer    Elevated troponin 10/21/2019    Encounter for cholecystectomy 05/06/2021 7/2020    Essential hypertension     GERD (gastroesophageal reflux disease)     History of chemotherapy     History of renal transplant 08/21/2013    (LD 2/12/2002)    HLD (hyperlipidemia)     Hypercholesteremia     Hypertension     Hypothyroidism 03/23/2022    Kidney transplant rejection     Menopause     Migraine     Obesity     Osteoporosis     Pancreatitis 08/28/2022    Pyelonephritis 07/13/2020    Recurrent urinary tract infection 09/27/2016    Renal cyst 2002    kidney transplant     Spontaneous bacterial peritonitis ([facility] 01/16/2019 Ulcer      Past Surgical History:   Procedure Laterality Date    COLONOSCOPY      Dr Tonja Richey  5yrs ago    ENDOSCOPY VISIT-OUTPATIENT      Dr Tonja Richey  5 yrs ago    ENDOSCOPY, COLON, DIAGNOSTIC      with Dr. Vicky Cole CHOLECYSTECTOMY  02/2021    HX COLONOSCOPY  05/13/2022    HX GI      ulcer    HX HEENT      sinus surg    HX OTHER SURGICAL  02/21/2022    SIGMOIDOSCOPY, FLEXIBLE;  Surgeon: Neeru Felton MD    HX RENAL TRANSPLANT      HX TRANSPLANT      kidney transplant 2002    [place] UNLISTED PROCEDURE VASCULAR SURGERY      shunt in prep for dialysis      Family History   Problem Relation Age of Onset    Diabetes Mother     Cervical Cancer Mother     Arthritis-rheumatoid Mother     Hypertension Father     Diabetes Father     Thyroid Disease Sister     Colon Polyps Brother      Social History     Tobacco Use    Smoking status: Never    Smokeless tobacco: Never   Substance Use Topics    Alcohol use: No      Prior to Admission medications    Medication Sig Start Date End Date Taking? Authorizing Provider   clopidogreL (PLAVIX) 75 mg tab TAKE 1 TABLET EVERY MORNING 1/13/23  Yes Maximilian Muhammad MD   Eliquis 2.5 mg tablet TAKE 1 TABLET TWICE DAILY 1/13/23  Yes Maximilian Muhammad MD   losartan (COZAAR) 50 mg tablet TAKE 1 TABLET EVERY MORNING 1/13/23  Yes Maximilian Muhammad MD   levothyroxine (SYNTHROID) 75 mcg tablet TAKE 1 TABLET EVERY DAY BEFORE BREAKFAST 1/13/23  Yes Maximilian Muhammad MD   amiodarone (CORDARONE) 200 mg tablet TAKE 1 TABLET EVERY MORNING 1/13/23  Yes Maximilian Muhammad MD   simvastatin (ZOCOR) 20 mg tablet TAKE 1 TABLET EVERY DAY AS DIRECTED 1/13/23  Yes Maximilian Muhammad MD   carvediloL (COREG) 25 mg tablet TAKE 1 TABLET TWICE DAILY WITH MEALS 1/13/23  Yes Maximilian Muhammad MD   amLODIPine (NORVASC) 10 mg tablet TAKE 1 TABLET EVERY DAY 1/6/23  Yes Maximilian Muhammad MD   Trelegy Ellipta 100-62.5-25 mcg inhaler INHALE 1 PUFF EVERY DAY.  PLEASE CALL AND SCHEDULE AN APPOINTMENT WITH NEW PCP FOR FUTURE REFILLS 11/19/22  Yes Angelica Coyle MD   famotidine (PEPCID) 20 mg tablet Take 20 mg by mouth daily. 10/13/22  Yes Provider, Historical   cinacalcet (SENSIPAR) 30 mg tablet Take 30 mg by mouth daily. Yes Provider, Historical   calcitRIOL (ROCALTROL) 0.5 mcg capsule Take 0.5 mcg by mouth See Admin Instructions. Take on non dialysis days (Tues, Wed, Thur, Sat, Sun)  Dialysis is on Monday and Friday 7/19/21  Yes Other, MD Abelardo   sevelamer carbonate (RENVELA) 800 mg tab tab Take 800 mg by mouth three (3) times daily. 6/8/21  Yes Marleni, MD Abelardo   traZODone (DESYREL) 50 mg tablet TAKE 1 TABLET BY MOUTH EVERY NIGHT FOR INSOMNIA ASSOCIATED WITH DEPRESSION 2/8/23   Jamil DANG MD   montelukast (SINGULAIR) 10 mg tablet TAKE 1 TABLET EVERY MORNING 1/13/23   Jamil DANG MD   sucralfate (CARAFATE) 1 gram tablet TAKE 1 TABLET FOUR TIMES DAILY 1/13/23   Angelica Coyle MD   valGANciclovir (VALCYTE) 450 mg tablet TAKE 2 TABLETS EVERY MORNING 1/13/23   Jamil DANG MD   dicyclomine (BENTYL) 10 mg capsule TAKE 1 CAPSULE EVERY MORNING 1/13/23   Jamil DANG MD   meclizine (ANTIVERT) 25 mg tablet TAKE 1 TABLET THREE TIMES DAILY AS NEEDED FOR DIZZINESS 12/21/22   Jamil DANG MD   ondansetron (ZOFRAN ODT) 4 mg disintegrating tablet Take 2 Tablets by mouth every eight (8) hours as needed for Nausea or Nausea or Vomiting. 11/28/22   Angelica Coyle MD   albuterol (PROVENTIL HFA, VENTOLIN HFA, PROAIR HFA) 90 mcg/actuation inhaler Take 1 Puff by inhalation every four (4) hours as needed for Wheezing.  Indications: asthma attack 11/3/22   Angelica Coyle MD   fluticasone propionate [facility]) 50 mcg/actuation nasal spray USE 1 SPRAY IN [facility] NOSTRIL EVERY MORNING 10/5/22   Jamil DANG MD   EPINEPHrine (EPIPEN) 0.3 mg/0.3 mL injection INJECT 0.3 ML INTRAMUSCULARLY ONCE AS NEEDED FOR ALLERGIC RESPONSE 10/5/22   Alix Ann, Seth DANG MD   Dexilant 60 mg CpDB capsule (delayed release) TAKE 1 CAPSULE BY MOUTH IN THE MORNING. 10/3/22   Tammi Galarza MD   diclofenac (VOLTAREN) 1 % gel Apply  to affected area four (4) times daily. 9/23/22   Jayant Maria MD   polyethylene glycol (Miralax) 17 gram packet Take 1 Packet by mouth two (2) times a day. 9/14/22   Pepe Duarte MD   doxercalciferoL (HECTOROL) 4 mcg/2 mL injection 6 mcg by IntraVENous route. 3/25/22   Provider, Historical   epoetin jalen (EPOGEN;PROCRIT) 10,000 unit/mL injection 10,000 Units by SubCUTAneous route. 3/28/22   Provider, Historical   hyoscyamine SL (LEVSIN/SL) 0.125 mg SL tablet 1 Tablet by SubLINGual route every four (4) hours as needed (irritable bowel). 7/27/22   Tammi Galarza MD   predniSONE (DELTASONE) 5 mg tablet Take 1 Tablet by mouth daily. Provider, Historical   albuterol (PROVENTIL VENTOLIN) 2.5 mg /3 mL (0.083 %) nebu USE 1 VIAL VIA NEBULIZER THREE TIMES DAILY 12/23/21   Tammi Galarza MD   RenaPlex-D 800 mcg-12.5 mg -2,000 unit tab Take 1 Tablet by mouth daily. 4/15/21   Marleni, MD Abelardo   aluminum & magnesium hydroxide-simethicone (Maalox Maximum Strength) 400-400-40 mg/5 mL suspension Take 10 mL by mouth every six (6) hours as needed for Indigestion. 9/17/20   Leticia Tee MD   ESTRACE 0.01 % (0.1 mg/gram) vaginal cream INSERT 2 GRAMS INTO VAGINA EVERY MONDAY AND THURSDAY 4/11/17   Narayan Masters MD   cranberry extract 425 mg cap Take 425 mg by mouth daily.  1/17/14   Provider, Historical        Allergies   Allergen Reactions    Aspirin Rash and Unknown (comments)    Bactrim [Sulfamethoxazole-Trimethoprim] Rash and Unknown (comments)    Bromfenac Rash and Unknown (comments)    Cefepime Other (comments)     Neurological reaction    Ceftriaxone Rash    Copper Rash    Ibuprofen Rash and Unknown (comments)    Ketorolac Tromethamine Rash and Unknown (comments)    Relafen [Nabumetone] Rash and Unknown (comments)    Rifampin Rash and Unknown (comments)    Vancomycin Rash and Unknown (comments)     Pt reports causes itching, takes benadryl prior to use. Pt denies anaphylaxis. Requires benadryl with each vancomycin dose       Review of Systems:  Review of Systems   Constitutional:  Positive for malaise/fatigue. HENT:  Negative for congestion and sore throat. Respiratory:  Negative for cough, sputum production and shortness of breath. Cardiovascular:  Negative for chest pain, palpitations and orthopnea. Gastrointestinal:  Positive for nausea. Musculoskeletal:  Positive for joint pain. Neurological:  Negative for dizziness and headaches. Objective:     Vitals:    02/10/23 1230 02/10/23 1330 02/10/23 1430 02/10/23 1650   BP: (!) 157/85 (!) 165/77 (!) 164/71 (!) 170/64   Pulse: 87 88 85 87   Resp: 18 16 16 17   Temp:    97.5 °F (36.4 °C)   SpO2: 97% 98% 98% 100%   Weight:       Height:            Physical Exam:  Physical Exam  Constitutional:       General: She is not in acute distress. Cardiovascular:      Rate and Rhythm: Normal rate and regular rhythm. Pulses: Normal pulses. Heart sounds: Normal heart sounds. Pulmonary:      Effort: Pulmonary effort is normal.      Breath sounds: Normal breath sounds. Abdominal:      General: Bowel sounds are normal.      Palpations: Abdomen is soft. Musculoskeletal:         General: Normal range of motion. Skin:     General: Skin is warm and dry. Comments: Left arm AV fistula + thrill/bruit   Neurological:      Mental Status: She is oriented to person, place, and time. Psychiatric:         Mood and Affect: Mood is anxious.         Recent Results (from the past 24 hour(s))   EKG, 12 LEAD, INITIAL    Collection Time: 02/10/23 10:47 AM   Result Value Ref Range    Ventricular Rate 76 BPM    Atrial Rate 76 BPM    P-R Interval 200 ms    QRS Duration 94 ms    Q-T Interval 423 ms    QTC Calculation (Bezet) 476 ms    Calculated P Axis 44 degrees    Calculated R Axis 57 degrees Calculated T Axis 53 degrees    Diagnosis       Sinus rhythm  WNL    Confirmed by JAVIER Perez ([id number]) on 2/10/2023 1:14:06 PM     CBC WITH AUTOMATED DIFF    Collection Time: 02/10/23 11:55 AM   Result Value Ref Range    WBC 7.9 4.6 - 13.2 K/uL    RBC 3.77 (L) 4.20 - 5.30 M/uL    HGB 11.0 (L) 12.0 - 16.0 g/dL    HCT 34.9 (L) 35.0 - 45.0 %    MCV 92.6 78.0 - 100.0 FL    MCH 29.2 24.0 - 34.0 PG    MCHC 31.5 31.0 - 37.0 g/dL    RDW 17.5 (H) 11.6 - 14.5 %    PLATELET 312 784 - 006 K/uL    MPV 8.7 (L) 9.2 - 11.8 FL    NRBC 0.0 0.0  WBC    ABSOLUTE NRBC 0.00 0.00 - 0.01 K/uL    NEUTROPHILS 74 (H) 40 - 73 %    LYMPHOCYTES 19 (L) 21 - 52 %    MONOCYTES 4 3 - 10 %    EOSINOPHILS 2 0 - 5 %    BASOPHILS 1 0 - 2 %    IMMATURE GRANULOCYTES 0 0 - 0.5 %    ABS. NEUTROPHILS 5.8 1.8 - 8.0 K/UL    ABS. LYMPHOCYTES 1.5 0.9 - 3.6 K/UL    ABS. MONOCYTES 0.3 0.05 - 1.2 K/UL    ABS. EOSINOPHILS 0.2 0.0 - 0.4 K/UL    ABS. BASOPHILS 0.1 0.0 - 0.1 K/UL    ABS. IMM. GRANS. 0.0 0.00 - 0.04 K/UL    DF AUTOMATED     METABOLIC PANEL, COMPREHENSIVE    Collection Time: 02/10/23 11:55 AM   Result Value Ref Range    Sodium 135 (L) 136 - 145 mmol/L    Potassium 5.9 (H) 3.5 - 5.5 mmol/L    Chloride 106 100 - 111 mmol/L    CO2 16 (L) 21 - 32 mmol/L    Anion gap 13 3.0 - 18.0 mmol/L    Glucose 78 74 - 99 mg/dL    BUN 95 (H) 7 - 18 mg/dL    Creatinine 12.60 (H) 0.60 - 1.30 mg/dL    BUN/Creatinine ratio 8 (L) 12 - 20      eGFR 3 (L) >60 ml/min/1.73m2    Calcium 7.3 (L) 8.5 - 10.1 mg/dL    Bilirubin, total 0.6 0.2 - 1.0 mg/dL    AST (SGOT) 17 10 - 38 U/L    ALT (SGPT) 17 13 - 56 U/L    Alk.  phosphatase 167 (H) 45 - 117 U/L    Protein, total 6.6 6.4 - 8.2 g/dL    Albumin 3.0 (L) 3.4 - 5.0 g/dL    Globulin 3.6 2.0 - 4.0 g/dL    A-G Ratio 0.8 0.8 - 1.7     MAGNESIUM    Collection Time: 02/10/23 11:55 AM   Result Value Ref Range    Magnesium 2.4 1.6 - 2.6 mg/dL   TROPONIN-HIGH SENSITIVITY    Collection Time: 02/10/23 11:55 AM   Result Value Ref Range Troponin-High Sensitivity 23 0 - 54 ng/L   NT-PRO BNP    Collection Time: 02/10/23 11:55 AM   Result Value Ref Range    NT pro-BNP 17,685 (H) 0 - 900 pg/mL   LIPASE    Collection Time: 02/10/23 11:55 AM   Result Value Ref Range    Lipase 157 73 - 393 U/L   GLUCOSE, POC    Collection Time: 02/10/23  4:58 PM   Result Value Ref Range    Glucose (POC) 62 (L) 70 - 110 mg/dL    Performed by Mira Colon        XR Results (maximum last 3): Results from [place] encounter on 02/10/23    XR CHEST PORT    Narrative  INDICATION: ESRD. Portable AP view of the chest.    Direct comparison made to prior chest x-ray dated 11/21/2022. Cardiomediastinal silhouette is stable. There is pulmonary vascular prominence  and interstitial edema. No pleural fluid is seen. There is suboptimal retroaortic  opacification. Impression  Moderate pulmonary vascular congestive changes. Results from [place] encounter on 11/21/22    XR CHEST PORT    Narrative  EXAM: XR CHEST PORT    CLINICAL INDICATION/HISTORY: fatigue, chest pain  -Additional: None    COMPARISON: Radiographs 10/22/2022    TECHNIQUE: Frontal view of the chest    _______________    FINDINGS:    HEART AND MEDIASTINUM: Normal cardiac size and mediastinal contours. LUNGS AND PLEURAL SPACES: Mild increase in pulmonary vascular prominence. No  pneumothorax or definite pleural effusion. No dense consolidation. BONY THORAX AND SOFT TISSUES: No acute osseous abnormality    _______________    Impression  Mild interval increase in pulmonary vascular congestion without superimposed  acute abnormality. Results from [place] encounter on 10/22/22    XR CHEST PORT    Narrative  EXAM: XR CHEST PORT. CLINICAL INDICATION/HISTORY: cough,.  -Additional: None.     TECHNIQUE: A portable erect AP radiographic view of the chest.    COMPARISON: Radiograph of 09/10/2022.  _______________    FINDINGS:    Lungs and pleural spaces:  > The lungs are essentially clear.  > No pneumothorax or appreciable significant pleural effusion. Cardiomediastinal silhouette:  > Normal for age. Bones:  > No acute findings. Other:  > None.  _______________    Impression  No acute process. _______________      CT Results (maximum last 3): Results from [place] encounter on 10/26/22    CT ABD PELV WO CONT    Narrative  EXAM: CT of the abdomen and pelvis    INDICATION: Pain. COMPARISON: 9/10/2022    TECHNIQUE: Axial CT imaging of the abdomen and pelvis was performed without  intravenous contrast. Multiplanar reformats were generated. One or more dose reduction techniques were used on this CT: automated exposure  control, adjustment of the mAs and/or kVp according to patient size, and  iterative reconstruction techniques. The specific techniques used on this CT  exam have been documented in the patient's electronic medical record. Digital  Imaging and Communications in Medicine (DICOM) format image data are available  to nonaffiliated external healthcare facilities or entities on a secure, media  free, reciprocally searchable basis with patient authorization for at least a  12-month period after this study. _______________    FINDINGS:    LOWER CHEST: Cardiomegaly. LIVER, BILIARY: Liver is normal. No biliary dilation. Cholecystectomy. PANCREAS: Normal.    SPLEEN: Normal.    ADRENALS: Normal.    KIDNEYS/URETERS/BLADDER: Atrophic kidneys. Right lower quadrant transplanted  kidney with perinephric stranding, unchanged. No hydronephrosis. PELVIC ORGANS: Unremarkable. VASCULATURE: Vascular calcifications. LYMPH NODES: No enlarged lymph nodes. GASTROINTESTINAL TRACT: Small hiatal hernia. No bowel dilation or wall  thickening. Diverticulosis. BONES: No acute or aggressive osseous abnormalities identified. OTHER: None.    _______________    Impression  No acute intra-abdominal abnormality.     Right lower quadrant transplanted kidney with perinephric stranding, unchanged. No hydronephrosis. Diverticulosis. Cardiomegaly. Results from [place] encounter on 09/10/22    CT ABD PELV WO CONT    Narrative  EXAM: CT of the abdomen and pelvis    INDICATION: Vomiting. Abdominal pain    COMPARISON: 9/6/2022    TECHNIQUE: Axial CT imaging of the abdomen and pelvis was performed without  intravenous or oral contrast. Multiplanar reformats were generated. One or more dose reduction techniques were used on this CT: automated exposure  control, adjustment of the mAs and/or kVp according to patient's size, and  iterative reconstruction techniques. The specific techniques utilized on this CT  exam have been documented in the patient's electronic medical record. Digital Imaging and Communications in Medicine (DICOM) format image data are  available to nonaffiliated external healthcare facilities or entities on a  secure, media free, reciprocally searchable basis with patient authorization for  at least a 12-month period after this study. _______________    FINDINGS:    LOWER CHEST: Interval development of trace right pleural effusions since the  prior study. LIVER, BILIARY: Liver is normal. No biliary dilation. Cholecystectomy. PANCREAS: Atrophic. SPLEEN: Normal.    ADRENALS: Normal.    KIDNEYS/URETERS/BLADDER: Bilateral atrophic kidneys with cysts. PELVIC ORGANS: Right pelvic kidney demonstrates cystic mass with adjacent  perinephric stranding. There is no hydronephrosis. VASCULATURE: Prominent atherosclerotic calcifications noted without aneurysm. LYMPH NODES: No enlarged lymph nodes. GASTROINTESTINAL TRACT: Multiple colonic diverticula are present. Mild  irregularity of the right colonic margins suggests the possibility of possible  colitis but without obstruction. Appendix appears normal.    BONES: Degenerative changes are seen throughout the spine with partial fusion  seen in the thoracic segments.     OTHER: Atrophic uterus. _______________    Impression  Atrophic native kidneys. Transplant kidney right lower quadrant demonstrating  perinephric stranding similar to the prior study. Lack of contrast limits  evaluation for pyelonephritis. There is no hydronephrosis however. Mild irregularity of the right colonic margins suggests possible colitis. Clinically correlate. Extensive colonic diverticula are present without evidence of acute  diverticulitis or obstruction. Results from Abstract encounter on 22    CT CHEST ABD PELV WO CONT    Impression  [address] Cat Scan  [address]  [phone]    Imaging Result    Name:  Jennifer Phelps ([id number])  Sex: Female :  1955 Ordering Provider: Zaire GARCIA Provider:    Diagnosis:    Polytrauma, blunt  Procedures Performed:  CT CHEST/ABD/PELVIS W/O CONTRAST  [id number] Exam Date/Time:  2022  4:38 PM      EXAM: CT CHEST/ABD/PELVIS W/O CONTRAST    CLINICAL INDICATION/HISTORY: Provided with order -   Polytrauma, blunt  > Additional: Chest pain, abdominal pain    COMPARISON: Abdominal CT dated 2022    TECHNIQUE: Helical CT imaging of the chest, abdomen, and pelvis was performed without intravenous contrast. Multiplanar reformats were generated. One or more dose reduction techniques were used on this CT: automated exposure control, adjustment of the mAs and/or kVp according to patient size, and iterative reconstruction techniques. The specific techniques used on this CT exam have been documented in the patient's electronic medical record. Digital Imaging and Communications in Medicine (DICOM) format image data are available to nonaffiliated external healthcare facilities or entities on a secure, media free, reciprocally searchable basis with patient authorization for at least a 12-month period after this study.     ________________    FINDINGS:    CHEST:    LUNGS: No suspicious nodule or mass. AIRWAY: Normal.    PLEURA: Normal, with no effusion or pneumothorax. MEDIASTINUM: Normal heart size. No pericardial effusion. Atherosclerotic calcifications in the aorta and coronary arteries. Small hiatal hernia. LYMPH NODES: No enlarged lymph nodes. OTHER: None.    _______________    ABDOMEN/PELVIS:    LIVER, BILIARY: Liver is relatively hyperdense which may relate to current or previous amiodarone use. No evidence of liver trauma. No biliary dilation. Cholecystectomy. PANCREAS: Suspect a few small pancreatic cysts. No peripancreatic fluid or inflammation. No pancreatic ductal dilation. SPLEEN: Normal.    ADRENALS: Normal.    KIDNEYS/URETERS/BLADDER: Native kidneys are atrophic with bilateral renal cysts. Indeterminant isodense lesion exophytic from the right kidney on image 113 measuring approximately 1 cm which is unchanged compared to several prior studies dating back to at least 2019. Transplant kidney in the right lower quadrant contains several simple and hyperdense proteinaceous/hemorrhagic cysts. No hydronephrosis. Mild stranding around the transplant kidney which is nonspecific. PELVIC ORGANS: Unremarkable. LYMPH NODES: No enlarged lymph nodes. GASTROINTESTINAL TRACT: No bowel dilation or wall thickening. Colonic diverticulosis with no evidence of diverticulitis. VASCULATURE: Aortoiliac atherosclerosis. BONES: Multilevel degenerative changes throughout the spine with multilevel spondylitic ankylosis in the thoracic spine. No acute osseous abnormalities. OTHER: None.    _______________    IMPRESSION    1. No acute traumatic injury identified. 2. Suspect multiple pancreatic cystic lesions which are not well evaluated. Recommend follow-up MRCP is an outpatient. 3. Multiple chronic ancillary findings as above.     Signed By: Alexander Rojas MD on 7/22/2022 5:19 PM        Dictated by: Naomy Harrison on Fri Jul 22, 2022  4:51:17 PM EDT  Signed By:Luna Gonzalo Carrion MD  7/22/2022  5:19 PM      [facility] [[id number]]    ~~This result was abstracted from the servicing EHR system. ~~      MRI Results (maximum last 3): Results from Office Visit encounter on 05/09/12    MRI PELV WO CONT      MRI ABD WO CONT      Nuclear Medicine Results (maximum last 3): No results found for this or any previous visit. US Results (maximum last 3): Results from [place] encounter on 08/28/22    US GALLBLADDER    Narrative  EXAM: Limited right upper quadrant abdominal ultrasound    INDICATION: Increasing abdominal pain. COMPARISON: Abdominal CT 08/28/2022    TECHNIQUE: Real-time sonography in multiple planes of the right upper quadrant  was performed with image documentation. Grayscale, color flow Doppler imaging,  and velocity spectral waveform analysis of the portal vein was performed (duplex  imaging). _______________    FINDINGS:    LIVER: The liver is within normal limits in echogenicity, without focal mass. Liver is normal in size, measuring 14 cm. Color flow Doppler and velocity  spectral waveform analysis of the portal vein shows normal (hepatopedal)  direction of flow. BILIARY SYSTEM: No intrahepatic biliary dilatation. Common bile duct is normal  in caliber, measuring 5 mm. GALLBLADDER: Cholecystectomy. TRANSPLANT RIGHT ILIAC FOSSA KIDNEY: Right kidney is small in size, measuring 8  cm in length. No hydronephrosis. No solid renal mass. Simple appearing cysts,  largest measuring up to 2 cm. No visible calculi. Increased cortical  echogenicity with cortical thinning typically seen with medical renal disease. PANCREAS: Head and body are unremarkable in appearance though the tail is  obscured by overlying bowel gas. IVC: Visualized portions are normal in caliber and patent. OTHER: No free intraperitoneal fluid.    _______________    Impression  1. Cholecystectomy. 2. No acute abnormality.       Results from Abstract encounter on 13    US RETROPERITONEUM COMP    Narrative  Auth Prov: Dennie Quails  CC Prov:              [id number]  [id number] [facility] [address]        Imaging Result      Name:  Grant Lane ([id number])  Sex: Female  :    66year old  Patient Class: Outpatient Private  Diagnosis:  Kidney replaced by transplant [V42.0 (ICD-9-CM)]        Procedures Performed:  Exam Date/Time:  Reason for Exam:  US RETROPERITONEAL  [id number]   2013 10:40 AM    None Specified              History: Renal failure with kidney transplant. Procedure: Retroperitoneal ultrasound. [id number]    Comparison:Retroperitoneal ultrasound 12. Findings: Native kidneys are both small and echogenic. Right  kidney measures 5 x 1.9 x 2.0 cm. It contains a tiny hypoechoic  area in the upper pole consistent with cyst.  This is stable. Left kidney measures 9.1 x 3.1 x 2.6 cm. There are 2 cysts in the  upper pole. These were present previously. There is a septation  in the larger cyst which measures 3 cm in greatest dimension. No  solid masses or hydronephrosis involving the native kidneys. Transplant kidney in the right lower quadrant is normal measuring  11.9 x 4.9 by her centimeters. It contains at least 2 small  cysts, largest measuring 1.6 cm. No solid mass or  hydronephrosis. Bladder is normal in appearance. Transplanted left ureteral jet  was identified. IMPRESSION  Impression:    1. Native kidneys are echogenic and contain bilateral cysts. One on the left is septated but stable. 2.  Transplanted kidney right lower quadrant contains several  small simple cysts. It is otherwise normal.    3.  Grossly normal bladder.           Dictated by: Lynnwood Brunner on  12:11:52 PM EDT      Signed Jolly Toscano MD 2013 12:16 PM                            [facility] [[id number]]      Active Problems:    ESRD needing dialysis [facility]) (11/21/2022)        Assessment/Plan:   Fluid overload- d/t missed HD , will have dialysis this evening, nephrology following. Repeat labs in am    2. Diarrhea- resolved, ? Etiology, viral, rxn to vaccine    3. Hypertension- restart home medications. 4. Hx of DVTS- on eliquis. 5. Hypothyroidism- on levothyroxine    6. ESRD- HD per nephrology    7. Discharge- possible next 48 hrs depending on clinical course.        DVT Prophylaxis  eliquis  Code Status  Full  POA [facility]  daughter Morena Massey   Total Time 35  minutes      Signed By: Ayesha Harris NP     February 10, 2023

## 2023-02-11 NOTE — PROGRESS NOTES
INTERNAL MEDICINE PROGRESS NOTE      Patient: Tom Alfaro   YOB: 1955   MRN: [id number]      Tom Alfaro is a 74 y.o. female who comes to the hospital with complaints of general malaise, reported had missed HD for 2 days due to diarrhea and feeling unwell since Monday after receiving the first dose of shingles vaccine. She started having diarrhea and nausea, with left arm pain from the injection. She reported poor appetite and could not get out of bed very well to ambulate. She will be admitted for emergent hemodialysis and further evaluation and treatment. Hospital course / Assessment    Principle Problems:  Fluid overload  - secondary to missed HD , will have dialysis , nephrology following.   - repeated lab Cr improved 12.6 -> 6.8., K 5.9 ->4.3  - clinically improved and the patient want to be d.c home   - educated about the importance of HD compliance  - Still having nausea and not feeling well    Hypoglycemic episode: cont' monitor    Diarrhea- resolved, ? Etiology, viral, or may be a reaction to recent vaccine  Hypertension- restart home medications. Hx of DVTS- on eliquis.   Hypothyroidism- on levothyroxine  ESRD- HD per nephrology         Code Status: full code    DVT prophylaxis: pharmacologic and mechanical    Disposition    Disposition: home    Subjective / ROS:   Patient states she feel better, no new complains      Medical Decision Making   Chart, Images and Lab data reviewed, necessary medical Orders placed   Discussed with nursing staff     Vitals:    02/10/23 2104 23 0030 23 0400 23 0743   BP:  (!) 151/70 127/83    Pulse:  82 86 (!) 123   Resp:  18 18 18   Temp:  97.6 °F (36.4 °C) 98.7 °F (37.1 °C)    TempSrc:  Axillary Temporal    SpO2:  96% 98% 96%   Weight: 144 lb (65.3 kg)      Height:         Temp (24hrs), Av.2 °F (36.8 °C), Min:97.6 °F (36.4 °C), Max:98.7 °F (37.1 °C)    No intake or output data in the 24 hours ending 23 0809    Physical Exam: General Appearance:   Appears in no acute distress.,  HEENT:   Moist oral mucous membranes, conjunctiva clear,   Neck:   Supple  Lungs: No wheezes. , No rales. , Normal respiratory effort,   Heart:   Regular rate and rhythm  Abdomen:   Soft , Non-distended and Non-tender,   Extremities:   + edema of legs  Neuro:   alert, oriented, moves all extremities well    Current medications:     Current Facility-Administered Medications   Medication Dose Route Frequency    albuterol sulfate HFA (PROVENTIL;VENTOLIN;PROAIR) 108 (90 Base) MCG/ACT inhaler 1 puff  1 puff Inhalation Q4H PRN    aluminum & magnesium hydroxide-simethicone (MAALOX) 200-200-20 MG/5ML suspension 10 mL  10 mL Oral Q6H PRN    amiodarone (CORDARONE) tablet 200 mg  200 mg Oral Daily    amLODIPine (NORVASC) tablet 10 mg  10 mg Oral Daily    carvedilol (COREG) tablet 25 mg  25 mg Oral BID WC    cinacalcet (SENSIPAR) tablet 30 mg  30 mg Oral Daily    clopidogrel (PLAVIX) tablet 75 mg  75 mg Oral Daily    apixaban (ELIQUIS) tablet 2.5 mg  2.5 mg Oral BID    famotidine (PEPCID) tablet 10 mg  10 mg Oral Daily    fluticasone (FLONASE) 50 MCG/ACT nasal spray 2 spray  2 spray Each Nostril Daily    hyoscyamine (OSCIMIN) dispersible tablet 125 mcg  125 mcg SubLINGual Q4H PRN    levothyroxine (SYNTHROID) tablet 75 mcg  75 mcg Oral QAM AC    losartan (COZAAR) tablet 50 mg  50 mg Oral Daily    meclizine (ANTIVERT) tablet 12.5 mg  12.5 mg Oral Q6H PRN    montelukast (SINGULAIR) tablet 10 mg  10 mg Oral Daily    ondansetron (ZOFRAN-ODT) disintegrating tablet 8 mg  8 mg Oral Q8H PRN    polyethylene glycol (GLYCOLAX) packet 17 g  17 g Oral BID    sevelamer (RENVELA) tablet 800 mg  800 mg Oral TID WC    atorvastatin (LIPITOR) tablet 10 mg  10 mg Oral Nightly    traZODone (DESYREL) tablet 50 mg  50 mg Oral Nightly    mometasone-formoterol (DULERA) 200-5 MCG/ACT inhaler 2 puff  2 puff Inhalation BID    And    tiotropium (SPIRIVA RESPIMAT) 2.5 MCG/ACT inhaler 2 puff  2 puff Inhalation Daily    sodium chloride flush 0.9 % injection 5-40 mL  5-40 mL IntraVENous Q8H    acetaminophen (TYLENOL) tablet 650 mg  650 mg Oral Q4H PRN    Or    acetaminophen (TYLENOL) suppository 650 mg  650 mg Rectal Q4H PRN    calcitRIOL (ROCALTROL) capsule 0.5 mcg  0.5 mcg Oral Once per day on Sun Tue Wed Thu Sat          Laboratory and Radiology Data :      No results found for: FERR  WBC   Date Value Ref Range Status   02/11/2023 7.3 4.6 - 13.2 K/uL Final     No results found for: NEERU  No components found for: UEO    Microbiology    No components found for: GMS    Recent Results (from the past 24 hour(s))   CBC with Auto Differential    Collection Time: 02/10/23 11:55 AM   Result Value Ref Range    WBC 7.9 4.6 - 13.2 K/uL    RBC 3.77 (L) 4.20 - 5.30 M/uL    Hemoglobin 11.0 (L) 12.0 - 16.0 g/dL    Hematocrit 34.9 (L) 35.0 - 45.0 %    MCV 92.6 78.0 - 100.0 FL    MCH 29.2 24.0 - 34.0 PG    MCHC 31.5 31.0 - 37.0 g/dL    RDW 17.5 (H) 11.6 - 14.5 %    Platelets 274 184 - 808 K/uL    MPV 8.7 (L) 9.2 - 11.8 FL    Nucleated RBCs 0.0 0.0  WBC    NRBC Absolute 0.00 0.00 - 0.01 K/uL    Neutrophils % 74 (H) 40 - 73 %    Lymphocytes % 19 (L) 21 - 52 %    Monocytes % 4 3 - 10 %    Eosinophils % 2 0 - 5 %    Basophils % 1 0 - 2 %    Immature Granulocytes 0 0 - 0.5 %    Neutrophils Absolute 5.8 1.8 - 8.0 K/UL    Lymphocytes Absolute 1.5 0.9 - 3.6 K/UL    Monocytes Absolute 0.3 0.05 - 1.2 K/UL    Eosinophils Absolute 0.2 0.0 - 0.4 K/UL    Basophils Absolute 0.1 0.0 - 0.1 K/UL    Granulocyte Absolute Count 0.0 0.00 - 0.04 K/UL    Differential Type AUTOMATED     Comprehensive Metabolic Panel    Collection Time: 02/10/23 11:55 AM   Result Value Ref Range    Sodium 135 (L) 136 - 145 mmol/L    Potassium 5.9 (H) 3.5 - 5.5 mmol/L    Chloride 106 100 - 111 mmol/L    CO2 16 (L) 21 - 32 mmol/L    Anion Gap 13 3.0 - 18.0 mmol/L    Glucose 78 74 - 99 mg/dL    BUN 95 (H) 7 - 18 mg/dL    Creatinine 12.60 (H) 0.60 - 1.30 mg/dL Bun/Cre Ratio 8 (L) 12 - 20      ESTIMATED GLOMERULAR FILTRATION RATE 3 (L) >60 ml/min/1.73m2    Calcium 7.3 (L) 8.5 - 10.1 mg/dL    Total Bilirubin 0.6 0.2 - 1.0 mg/dL    AST 17 10 - 38 U/L    ALT 17 13 - 56 U/L    Alkaline Phosphatase 167 (H) 45 - 117 U/L    Total Protein 6.6 6.4 - 8.2 g/dL    Albumin 3.0 (L) 3.4 - 5.0 g/dL    Globulin 3.6 2.0 - 4.0 g/dL    Albumin/Globulin Ratio 0.8 0.8 - 1.7     Lipase    Collection Time: 02/10/23 11:55 AM   Result Value Ref Range    Lipase 157 73 - 393 U/L   Magnesium    Collection Time: 02/10/23 11:55 AM   Result Value Ref Range    Magnesium 2.4 1.6 - 2.6 mg/dL   Troponin, High Sensitivity    Collection Time: 02/10/23 11:55 AM   Result Value Ref Range    Troponin, High Sensitivity 23 0 - 54 ng/L   proBNP, N-TERMINAL    Collection Time: 02/10/23 11:55 AM   Result Value Ref Range    BNP 17,685 (H) 0 - 900 pg/mL   EKG 12 Lead    Collection Time: 02/10/23  1:14 PM   Result Value Ref Range    Ventricular Rate 76 BPM    Atrial Rate 76 BPM    P-R Interval 200 ms    QRS Duration 94 ms    Q-T Interval 423 ms    QTc Calculation (Bazett) 476 ms    P Axis 44 degrees    R Axis 57 degrees    T Axis 53 degrees    Diagnosis       Sinus rhythm  WNL    Confirmed by PABLO Velez ([id number]) on 2/10/2023 1:14:06 PM     POCT Glucose    Collection Time: 02/10/23  4:58 PM   Result Value Ref Range    POC Glucose 62 (L) 70 - 110 mg/dL    Performed by: Dana Angela    CBC    Collection Time: 02/11/23  5:25 AM   Result Value Ref Range    WBC 7.3 4.6 - 13.2 K/uL    RBC 3.95 (L) 4.20 - 5.30 M/uL    Hemoglobin 12.0 12.0 - 16.0 g/dL    Hematocrit 36.8 35.0 - 45.0 %    MCV 93.2 78.0 - 100.0 FL    MCH 30.4 24.0 - 34.0 PG    MCHC 32.6 31.0 - 37.0 g/dL    RDW 17.5 (H) 11.6 - 14.5 %    Platelets 421 251 - 854 K/uL    MPV 8.9 (L) 9.2 - 11.8 FL    Nucleated RBCs 0.0 0.0  WBC    nRBC 0.00 0.00 - 0.01 K/uL   Comprehensive Metabolic Panel    Collection Time: 02/11/23  5:25 AM   Result Value Ref Range Sodium 137 136 - 145 mmol/L    Potassium 4.3 3.5 - 5.5 mmol/L    Chloride 99 (L) 100 - 111 mmol/L    CO2 22 21 - 32 mmol/L    Anion Gap 16 3.0 - 18.0 mmol/L    Glucose 67 (L) 74 - 99 mg/dL    BUN 36 (H) 7 - 18 mg/dL    Creatinine 6.81 (H) 0.60 - 1.30 mg/dL    Bun/Cre Ratio 5 (L) 12 - 20      Est, Glom Filt Rate 6 (L) >60 ml/min/1.73m2    Calcium 8.5 8.5 - 10.1 mg/dL    Total Bilirubin 0.9 0.2 - 1.0 mg/dL    AST 30 10 - 38 U/L    ALT 28 13 - 56 U/L    Alk Phosphatase 225 (H) 45 - 117 U/L    Total Protein 6.9 6.4 - 8.2 g/dL    Albumin 3.1 (L) 3.4 - 5.0 g/dL    Globulin 3.8 2.0 - 4.0 g/dL    Albumin/Globulin Ratio 0.8 0.8 - 1.7         XR Results:  @BSHSILASTIMGCAT(FIO2563:1)@    CT Results:  @BSHSILASTIMGCAT(KWZ3774:1)@    MRI Results:  @BSHSILASTIMGCAT(YHD4135:1)@    Nuclear Medicine Results:  @BSHSILASTIMGCAT(ZEN4511:1)@    US Results:  @BSHSILASTIMGCAT(YVJ0023:1)@    IR Results:  @BSHSILASTIMGCAT(CMG6913:1)@    VAS/US Results:  @BSHSILASTIMGCAT(HVI2914:1)@            Edilma Villalta M.D.

## 2023-02-11 NOTE — PROCEDURES
Hemodialysis / [phone]    Vitals Pre Post Assessment Pre Post   BP BP: (!) 183/80 (02/10/23 2112)   153/71 LOC Alert and oriented x 4 Alert and oriented x 4   HR Pulse (Heart Rate): 89 (02/10/23 2112) 124 Lungs Diminished  Diminished    Resp Resp Rate: 18 (02/10/23 2112) 18 Cardiac NSR  NSR    Temp Temp: 98.4 °F (36.9 °C) (02/10/23 2112) 97.8 Skin Warm and dry  Warm and dry    Weight  NA NA Edema Trace   Trace    Tele status Monitored remotely  Monitored remotely  Pain Pain Intensity 1: 6 (02/10/23 1650) 6     Orders   Duration: Start: 2112 End: 0018 Total: 3hrs    Dialyzer: Dialyzer/Set Up Inspection: Jazmin Hassan (02/10/23 2112)   K Bath: Dialysate K (mEq/L): 2 (02/10/23 2112)   Ca Bath: Dialysate CA (mEq/L): 2.5 (02/10/23 2112)   Na: Dialysate NA (mEq/L): 138 (02/10/23 2112)   Bicarb: Dialysate HCO3 (mEq/L): 35 (02/10/23 2112)   Target Fluid Removal: Goal/Amount of Fluid to Remove (mL): 3000 mL (02/10/23 2112)     Access   Type & Location: Left Upper AVF   Comments:                              +Thrill and +bruit . No s/s of infection noted .           Labs   HBsAg (Antigen) / date:        UNKN                                       HBsAb (Antibody) / date: [facility]   Source:    Obtained/Reviewed  Critical Results Called HGB   Date Value Ref Range Status   02/10/2023 11.0 (L) 12.0 - 16.0 g/dL Final     Potassium   Date Value Ref Range Status   02/10/2023 5.9 (H) 3.5 - 5.5 mmol/L Final     Calcium   Date Value Ref Range Status   02/10/2023 7.3 (L) 8.5 - 10.1 mg/dL Final     BUN   Date Value Ref Range Status   02/10/2023 95 (H) 7 - 18 mg/dL Final     Creatinine   Date Value Ref Range Status   02/10/2023 12.60 (H) 0.60 - 1.30 mg/dL Final        Meds Given   Name Dose Route   NONE  NONE  NONE                Adequacy / Fluid    Total Liters Process: 64.1   Net Fluid Removed: 2500      Comments   Time Out Done:   (Time) 2109   Admitting Diagnosis: ESRD    Consent obtained/signed: Informed Consent Verified: Yes (02/10/23 2112)   Machine / Khadar Son # Machine Number: [id number] (02/10/23 2112)   Primary Nurse Rpt Pre: Harry Mann RN   Primary Nurse Rpt Post: Harry Mann RN   Pt Education: Procedural Education    Care Plan: Continue with HD care plan    Pts outpatient clinic:      Tx Summary   Comments:       Epic was down at the time patient treatment was completed. This an add on  to incomplete notes   SBAR received from Primary RN.                    + B/T. No issues with cannulation. Running well at . Pt arrived to HD [address]. Consent signed & on file OR Chronic consent applies. 2112: Pt cannulated with 87Y needles per policy & without issue. VSS. Dialysis Tx initiated. 0018: Tx ended. VSS. All possible blood returned to patient. Hemostasis achieved without issue. SBAR given to Primary, RN. Patient is stable at time of their departure. All Dialysis related medications have been reviewed.

## 2023-02-11 NOTE — PROGRESS NOTES
0700- Assumed care of Ms. Dion Simon from off going nurse. Bedside report received. She is currently resting in bed. Denies pain, voices no other concerns. Call bell within reach. All needs have currently been met. Patient's most recent vital signs:  Blood pressure (!) 146/83, pulse (!) 121, temperature 97.3 °F (36.3 °C), temperature source Temporal, resp. rate 18, height 5' 1\" (1.549 m), weight 144 lb (65.3 kg), SpO2 96 %. 3037- Pt currently in bed c/o nausea. Declining to take any morning meds until nausea subsides. Administered IV zofran.

## 2023-02-11 NOTE — PROGRESS NOTES
Problem: Falls - Risk of  Goal: *Absence of Falls  Description: Document Torsten Hampton Fall Risk and appropriate interventions in the flowsheet.   Outcome: Progressing Towards Goal  Note: Fall Risk Interventions:                                Problem: Patient Education: Go to Patient Education Activity  Goal: Patient/Family Education  Outcome: Progressing Towards Goal     Problem: Discharge Planning  Goal: *Discharge to safe environment  Outcome: Progressing Towards Goal  Goal: *Knowledge of medication management  Outcome: Progressing Towards Goal  Goal: *Knowledge of discharge instructions  Outcome: Progressing Towards Goal     Problem: Patient Education: Go to Patient Education Activity  Goal: Patient/Family Education  Outcome: Progressing Towards Goal

## 2023-02-12 ENCOUNTER — APPOINTMENT (OUTPATIENT)
Age: 68
DRG: 640 | End: 2023-02-12
Attending: INTERNAL MEDICINE
Payer: MEDICARE

## 2023-02-12 VITALS
HEART RATE: 124 BPM | WEIGHT: 144 LBS | BODY MASS INDEX: 27.19 KG/M2 | OXYGEN SATURATION: 98 % | TEMPERATURE: 98.4 F | SYSTOLIC BLOOD PRESSURE: 153 MMHG | RESPIRATION RATE: 18 BRPM | DIASTOLIC BLOOD PRESSURE: 71 MMHG | HEIGHT: 61 IN

## 2023-02-12 PROCEDURE — 6370000000 HC RX 637 (ALT 250 FOR IP): Performed by: INTERNAL MEDICINE

## 2023-02-12 PROCEDURE — 94761 N-INVAS EAR/PLS OXIMETRY MLT: CPT

## 2023-02-12 PROCEDURE — 6360000002 HC RX W HCPCS: Performed by: INTERNAL MEDICINE

## 2023-02-12 PROCEDURE — G0378 HOSPITAL OBSERVATION PER HR: HCPCS

## 2023-02-12 PROCEDURE — 96376 TX/PRO/DX INJ SAME DRUG ADON: CPT

## 2023-02-12 PROCEDURE — 94640 AIRWAY INHALATION TREATMENT: CPT

## 2023-02-12 PROCEDURE — 2580000003 HC RX 258: Performed by: INTERNAL MEDICINE

## 2023-02-12 PROCEDURE — 74018 RADEX ABDOMEN 1 VIEW: CPT

## 2023-02-12 RX ADMIN — ONDANSETRON 4 MG: 2 INJECTION INTRAMUSCULAR; INTRAVENOUS at 13:40

## 2023-02-12 RX ADMIN — TRAZODONE HYDROCHLORIDE 50 MG: 50 TABLET ORAL at 20:48

## 2023-02-12 RX ADMIN — OXYCODONE AND ACETAMINOPHEN 1 TABLET: 5; 325 TABLET ORAL at 06:39

## 2023-02-12 RX ADMIN — MOMETASONE FUROATE AND FORMOTEROL FUMARATE DIHYDRATE 2 PUFF: 200; 5 AEROSOL RESPIRATORY (INHALATION) at 08:53

## 2023-02-12 RX ADMIN — ATORVASTATIN CALCIUM 10 MG: 10 TABLET, FILM COATED ORAL at 20:47

## 2023-02-12 RX ADMIN — SODIUM CHLORIDE, PRESERVATIVE FREE 10 ML: 5 INJECTION INTRAVENOUS at 00:26

## 2023-02-12 RX ADMIN — TIOTROPIUM BROMIDE INHALATION SPRAY 2 PUFF: 3.12 SPRAY, METERED RESPIRATORY (INHALATION) at 08:53

## 2023-02-12 RX ADMIN — SODIUM CHLORIDE, PRESERVATIVE FREE 10 ML: 5 INJECTION INTRAVENOUS at 08:32

## 2023-02-12 RX ADMIN — ONDANSETRON 8 MG: 4 TABLET, ORALLY DISINTEGRATING ORAL at 21:30

## 2023-02-12 RX ADMIN — OXYCODONE AND ACETAMINOPHEN 1 TABLET: 5; 325 TABLET ORAL at 12:20

## 2023-02-12 RX ADMIN — SODIUM CHLORIDE, PRESERVATIVE FREE 10 ML: 5 INJECTION INTRAVENOUS at 15:00

## 2023-02-12 RX ADMIN — MOMETASONE FUROATE AND FORMOTEROL FUMARATE DIHYDRATE 2 PUFF: 200; 5 AEROSOL RESPIRATORY (INHALATION) at 19:28

## 2023-02-12 RX ADMIN — APIXABAN 2.5 MG: 2.5 TABLET, FILM COATED ORAL at 20:47

## 2023-02-12 RX ADMIN — ONDANSETRON 4 MG: 2 INJECTION INTRAMUSCULAR; INTRAVENOUS at 06:43

## 2023-02-12 RX ADMIN — LEVOTHYROXINE SODIUM 75 MCG: 0.07 TABLET ORAL at 06:39

## 2023-02-12 ASSESSMENT — PAIN DESCRIPTION - DESCRIPTORS: DESCRIPTORS: ACHING

## 2023-02-12 ASSESSMENT — PAIN DESCRIPTION - PAIN TYPE: TYPE: ACUTE PAIN

## 2023-02-12 ASSESSMENT — PAIN SCALES - GENERAL
PAINLEVEL_OUTOF10: 4
PAINLEVEL_OUTOF10: 5
PAINLEVEL_OUTOF10: 6
PAINLEVEL_OUTOF10: 5
PAINLEVEL_OUTOF10: 4
PAINLEVEL_OUTOF10: 4

## 2023-02-12 ASSESSMENT — PAIN DESCRIPTION - ORIENTATION
ORIENTATION: RIGHT

## 2023-02-12 ASSESSMENT — PAIN DESCRIPTION - LOCATION
LOCATION: ARM
LOCATION: GENERALIZED
LOCATION: ARM

## 2023-02-12 NOTE — PROGRESS NOTES
HOSPITALIST PROGRESS NOTE  Natalia Coffman MD, [address]          Daily Progress Note: 2/12/2023      Subjective:     Patient is alert and oriented x4. Continues to be significantly weak and continues to have nausea and vomiting. Has not tolerated an oral diet over the last 2 days thus far. Status post hemodialysis on 2/11/2023. Would likely require hemodialysis again on 2/13/2023 per nephrology. No overnight fever/chills, chest pain, shortness of breath noted.       Medications reviewed  Current Facility-Administered Medications   Medication Dose Route Frequency    ondansetron (ZOFRAN) injection 4 mg  4 mg IntraVENous Q8H PRN    oxyCODONE-acetaminophen (PERCOCET) 5-325 MG per tablet 1 tablet  1 tablet Oral Q6H PRN    albuterol sulfate HFA (PROVENTIL;VENTOLIN;PROAIR) 108 (90 Base) MCG/ACT inhaler 1 puff  1 puff Inhalation Q4H PRN    aluminum & magnesium hydroxide-simethicone (MAALOX) 200-200-20 MG/5ML suspension 10 mL  10 mL Oral Q6H PRN    amiodarone (CORDARONE) tablet 200 mg  200 mg Oral Daily    amLODIPine (NORVASC) tablet 10 mg  10 mg Oral Daily    carvedilol (COREG) tablet 25 mg  25 mg Oral BID WC    cinacalcet (SENSIPAR) tablet 30 mg  30 mg Oral Daily    clopidogrel (PLAVIX) tablet 75 mg  75 mg Oral Daily    apixaban (ELIQUIS) tablet 2.5 mg  2.5 mg Oral BID    famotidine (PEPCID) tablet 10 mg  10 mg Oral Daily    fluticasone (FLONASE) 50 MCG/ACT nasal spray 2 spray  2 spray Each Nostril Daily    hyoscyamine (OSCIMIN) dispersible tablet 125 mcg  125 mcg SubLINGual Q4H PRN    levothyroxine (SYNTHROID) tablet 75 mcg  75 mcg Oral QAM AC    losartan (COZAAR) tablet 50 mg  50 mg Oral Daily    meclizine (ANTIVERT) tablet 12.5 mg  12.5 mg Oral Q6H PRN    montelukast (SINGULAIR) tablet 10 mg  10 mg Oral Daily    ondansetron (ZOFRAN-ODT) disintegrating tablet 8 mg  8 mg Oral Q8H PRN    polyethylene glycol (GLYCOLAX) packet 17 g  17 g Oral BID sevelamer (RENVELA) tablet 800 mg  800 mg Oral TID WC    atorvastatin (LIPITOR) tablet 10 mg  10 mg Oral Nightly    traZODone (DESYREL) tablet 50 mg  50 mg Oral Nightly    mometasone-formoterol (DULERA) 200-5 MCG/ACT inhaler 2 puff  2 puff Inhalation BID    And    tiotropium (SPIRIVA RESPIMAT) 2.5 MCG/ACT inhaler 2 puff  2 puff Inhalation Daily    sodium chloride flush 0.9 % injection 5-40 mL  5-40 mL IntraVENous Q8H    acetaminophen (TYLENOL) tablet 650 mg  650 mg Oral Q4H PRN    Or    acetaminophen (TYLENOL) suppository 650 mg  650 mg Rectal Q4H PRN    calcitRIOL (ROCALTROL) capsule 0.5 mcg  0.5 mcg Oral Once per day on Sun Tue Wed Thu Sat       Review of Systems:   Pertinent items are noted in HPI. Objective:   Physical Exam:     BP (!) 173/68   Pulse 73   Temp 97 °F (36.1 °C) (Temporal)   Resp 17   Ht 5' 1\" (1.549 m)   Wt 144 lb (65.3 kg)   SpO2 96%   BMI 27.21 kg/m²       Patient Vitals for the past 8 hrs:   Temp Pulse Resp BP SpO2   23 1556 97 °F (36.1 °C) 73 17 (!) 173/68 96 %   23 1049 97.6 °F (36.4 °C) 77 16 (!) 186/65 98 %          Temp (24hrs), Av.2 °F (36.2 °C), Min:96.7 °F (35.9 °C), Max:97.7 °F (36.5 °C)    No intake/output data recorded. No intake/output data recorded. General:  Alert, cooperative, no distress, appears stated age. Lungs:   Clear to auscultation bilaterally. Chest wall:  No tenderness or deformity. Heart:  Regular rate and rhythm, S1, S2 normal, no murmur, click, rub or gallop. Abdomen:   Soft, non-tender. Bowel sounds normal. No masses,  No organomegaly. Extremities: Extremities normal, atraumatic, no cyanosis or edema. Pulses: 2+ and symmetric all extremities.    Skin: Skin color, texture, turgor normal. No rashes or lesions   Neurologic: No gross sensory or motor deficits     Data Review:       Recent Days:  Recent Labs     02/10/23  1155 23  0525   WBC 7.9 7.3   HGB 11.0* 12.0   HCT 34.9* 36.8    250     Recent Labs 02/10/23  1155 02/11/23  0525   * 137   K 5.9* 4.3    99*   CO2 16* 22   BUN 95* 36*   MG 2.4  --    ALT 17 28     No results for input(s): PH, PCO2, PO2, HCO3, FIO2 in the last 72 hours. 24 Hour Results:  No results found for this or any previous visit (from the past 24 hour(s)). Assessment/Plan:     Non-cardiogenic fluid overload/ESRD dependent on dialysis  Secondary to missing sessions of dialysis. Nephrology consulted and patient received dialysis on 2/11/2023 with continued significant uremic symptoms of nausea and vomiting. Patient will receive dialysis again on 2/13/2023. Diarrhea  Resolved at this time likely reaction to shingles and booster COVID-vaccine. No BM over the last 48 hours. We will obtain KUB. History of atrial fibrillation  Controlled on amiodarone and currently on Eliquis. Hypertension  Continue Cozaar, Norvasc, Coreg. Hx of DVT   on eliquis. Hypothyroidism   levothyroxine        Care Plan discussed with: Patient/Family and Nurse    Total time spent with patient: 30 minutes. With greater than 50% spent in coordination of care and counseling.     Mary Aleman MD

## 2023-02-12 NOTE — PROGRESS NOTES
0700-BEDSIDE SHIFT REPORT RECEIVED ; PATIENT RESTING, CALL LIGHT IN BREACH; BED ALARM ON    0750-PATIENT SITUATED FOR BREAKFAST; PATIENT REFUSED BREAKFAST    0800-PATIENT ASSESSMENT; PATIENT STATES SHES IN PAIN IN HER RIGHT ARM; REFUSED AM MEDS, MAR REVIEWED WITH PATIENT.     MEDICATED FOR NAUSEA AND PAIN    PATIENT REFUSED BATH    1500-PATIENT SLEEPING      1524-MD ROUNDS; PATIENT TO HAVE DIALYSIS TOMORROW THEN POSSIBLE DISCHARGE; SUPERVISOR TO CALL DIALYSIS    1730-PATIENT IN BED EATING DINNER, CALL LIGHT IN REACH    1818-offered PRN PERCOCET FOR PAIN; PATIENT REFUSED

## 2023-02-13 PROBLEM — E43 SEVERE PROTEIN-CALORIE MALNUTRITION (HCC): Status: ACTIVE | Noted: 2022-09-11

## 2023-02-13 LAB
ANION GAP SERPL CALC-SCNC: 15 MMOL/L (ref 3–18)
BASOPHILS # BLD: 0.1 K/UL (ref 0–0.1)
BASOPHILS NFR BLD: 1 % (ref 0–2)
BUN SERPL-MCNC: 63 MG/DL (ref 7–18)
BUN/CREAT SERPL: 6 (ref 12–20)
CA-I BLD-MCNC: 7.9 MG/DL (ref 8.5–10.1)
CHLORIDE SERPL-SCNC: 96 MMOL/L (ref 100–111)
CO2 SERPL-SCNC: 21 MMOL/L (ref 21–32)
CREAT SERPL-MCNC: 10.1 MG/DL (ref 0.6–1.3)
DIFFERENTIAL METHOD BLD: ABNORMAL
EOSINOPHIL # BLD: 0.2 K/UL (ref 0–0.4)
EOSINOPHIL NFR BLD: 3 % (ref 0–5)
ERYTHROCYTE [DISTWIDTH] IN BLOOD BY AUTOMATED COUNT: 17.2 % (ref 11.6–14.5)
EST. AVERAGE GLUCOSE BLD GHB EST-MCNC: 88 MG/DL
GLUCOSE BLD STRIP.AUTO-MCNC: 118 MG/DL (ref 70–110)
GLUCOSE BLD STRIP.AUTO-MCNC: 194 MG/DL (ref 70–110)
GLUCOSE BLD STRIP.AUTO-MCNC: 39 MG/DL (ref 70–110)
GLUCOSE BLD STRIP.AUTO-MCNC: 52 MG/DL (ref 70–110)
GLUCOSE BLD STRIP.AUTO-MCNC: 52 MG/DL (ref 70–110)
GLUCOSE BLD STRIP.AUTO-MCNC: 58 MG/DL (ref 70–110)
GLUCOSE BLD STRIP.AUTO-MCNC: 67 MG/DL (ref 70–110)
GLUCOSE BLD STRIP.AUTO-MCNC: 73 MG/DL (ref 70–110)
GLUCOSE BLD STRIP.AUTO-MCNC: 77 MG/DL (ref 70–110)
GLUCOSE SERPL-MCNC: 35 MG/DL (ref 74–99)
HBA1C MFR BLD: 4.7 % (ref 4.2–5.6)
HCT VFR BLD AUTO: 36.8 % (ref 35–45)
HGB BLD-MCNC: 11.5 G/DL (ref 12–16)
IMM GRANULOCYTES # BLD AUTO: 0 K/UL (ref 0–0.04)
IMM GRANULOCYTES NFR BLD AUTO: 0 % (ref 0–0.5)
LYMPHOCYTES # BLD: 1.6 K/UL (ref 0.9–3.6)
LYMPHOCYTES NFR BLD: 31 % (ref 21–52)
MCH RBC QN AUTO: 29.7 PG (ref 24–34)
MCHC RBC AUTO-ENTMCNC: 31.3 G/DL (ref 31–37)
MCV RBC AUTO: 95.1 FL (ref 78–100)
MONOCYTES # BLD: 0.2 K/UL (ref 0.05–1.2)
MONOCYTES NFR BLD: 3 % (ref 3–10)
NEUTS SEG # BLD: 3.1 K/UL (ref 1.8–8)
NEUTS SEG NFR BLD: 62 % (ref 40–73)
NRBC # BLD: 0 K/UL (ref 0–0.01)
NRBC BLD-RTO: 0 PER 100 WBC
PERFORMED BY:: ABNORMAL
PERFORMED BY:: NORMAL
PERFORMED BY:: NORMAL
PLATELET # BLD AUTO: 267 K/UL (ref 135–420)
PMV BLD AUTO: 9.4 FL (ref 9.2–11.8)
POTASSIUM SERPL-SCNC: 5.4 MMOL/L (ref 3.5–5.5)
RBC # BLD AUTO: 3.87 M/UL (ref 4.2–5.3)
SODIUM SERPL-SCNC: 132 MMOL/L (ref 136–145)
WBC # BLD AUTO: 5.1 K/UL (ref 4.6–13.2)

## 2023-02-13 PROCEDURE — 6360000002 HC RX W HCPCS: Performed by: INTERNAL MEDICINE

## 2023-02-13 PROCEDURE — 2580000003 HC RX 258: Performed by: SPECIALIST

## 2023-02-13 PROCEDURE — 80048 BASIC METABOLIC PNL TOTAL CA: CPT

## 2023-02-13 PROCEDURE — 6370000000 HC RX 637 (ALT 250 FOR IP)

## 2023-02-13 PROCEDURE — 6360000002 HC RX W HCPCS

## 2023-02-13 PROCEDURE — 83036 HEMOGLOBIN GLYCOSYLATED A1C: CPT

## 2023-02-13 PROCEDURE — 94761 N-INVAS EAR/PLS OXIMETRY MLT: CPT

## 2023-02-13 PROCEDURE — 6370000000 HC RX 637 (ALT 250 FOR IP): Performed by: INTERNAL MEDICINE

## 2023-02-13 PROCEDURE — 2580000003 HC RX 258: Performed by: INTERNAL MEDICINE

## 2023-02-13 PROCEDURE — 2580000003 HC RX 258: Performed by: NURSE PRACTITIONER

## 2023-02-13 PROCEDURE — 36415 COLL VENOUS BLD VENIPUNCTURE: CPT

## 2023-02-13 PROCEDURE — 90935 HEMODIALYSIS ONE EVALUATION: CPT

## 2023-02-13 PROCEDURE — 97166 OT EVAL MOD COMPLEX 45 MIN: CPT

## 2023-02-13 PROCEDURE — 2500000003 HC RX 250 WO HCPCS

## 2023-02-13 PROCEDURE — 85025 COMPLETE CBC W/AUTO DIFF WBC: CPT

## 2023-02-13 PROCEDURE — 96376 TX/PRO/DX INJ SAME DRUG ADON: CPT

## 2023-02-13 PROCEDURE — G0378 HOSPITAL OBSERVATION PER HR: HCPCS

## 2023-02-13 PROCEDURE — 94640 AIRWAY INHALATION TREATMENT: CPT

## 2023-02-13 PROCEDURE — 82962 GLUCOSE BLOOD TEST: CPT

## 2023-02-13 RX ORDER — DEXTROSE MONOHYDRATE 100 MG/ML
INJECTION, SOLUTION INTRAVENOUS CONTINUOUS
Status: DISCONTINUED | OUTPATIENT
Start: 2023-02-13 | End: 2023-02-13

## 2023-02-13 RX ORDER — DOCUSATE SODIUM 100 MG/1
100 CAPSULE, LIQUID FILLED ORAL 2 TIMES DAILY
Status: DISCONTINUED | OUTPATIENT
Start: 2023-02-13 | End: 2023-02-14 | Stop reason: HOSPADM

## 2023-02-13 RX ORDER — POLYETHYLENE GLYCOL 3350 17 G/17G
17 POWDER, FOR SOLUTION ORAL DAILY
Status: DISCONTINUED | OUTPATIENT
Start: 2023-02-13 | End: 2023-02-14 | Stop reason: HOSPADM

## 2023-02-13 RX ORDER — DEXTROSE MONOHYDRATE 100 MG/ML
INJECTION, SOLUTION INTRAVENOUS CONTINUOUS PRN
Status: DISCONTINUED | OUTPATIENT
Start: 2023-02-13 | End: 2023-02-14 | Stop reason: HOSPADM

## 2023-02-13 RX ORDER — DEXTROSE MONOHYDRATE 100 MG/ML
INJECTION, SOLUTION INTRAVENOUS CONTINUOUS
Status: DISPENSED | OUTPATIENT
Start: 2023-02-13 | End: 2023-02-13

## 2023-02-13 RX ORDER — DEXTROSE MONOHYDRATE 100 MG/ML
INJECTION, SOLUTION INTRAVENOUS ONCE
Status: COMPLETED | OUTPATIENT
Start: 2023-02-13 | End: 2023-02-13

## 2023-02-13 RX ORDER — TRAMADOL HYDROCHLORIDE 50 MG/1
50 TABLET ORAL EVERY 6 HOURS PRN
Status: DISCONTINUED | OUTPATIENT
Start: 2023-02-13 | End: 2023-02-14 | Stop reason: HOSPADM

## 2023-02-13 RX ORDER — SODIUM CHLORIDE 9 MG/ML
INJECTION, SOLUTION INTRAVENOUS ONCE
Status: COMPLETED | OUTPATIENT
Start: 2023-02-13 | End: 2023-02-13

## 2023-02-13 RX ADMIN — SODIUM CHLORIDE 2000 ML: 9 INJECTION, SOLUTION INTRAVENOUS at 15:03

## 2023-02-13 RX ADMIN — MOMETASONE FUROATE AND FORMOTEROL FUMARATE DIHYDRATE 2 PUFF: 200; 5 AEROSOL RESPIRATORY (INHALATION) at 20:24

## 2023-02-13 RX ADMIN — GLUCAGON HYDROCHLORIDE 1 MG: 1 INJECTION, POWDER, FOR SOLUTION INTRAMUSCULAR; INTRAVENOUS; SUBCUTANEOUS at 06:22

## 2023-02-13 RX ADMIN — ATORVASTATIN CALCIUM 10 MG: 10 TABLET, FILM COATED ORAL at 21:34

## 2023-02-13 RX ADMIN — AMLODIPINE BESYLATE 10 MG: 5 TABLET ORAL at 09:29

## 2023-02-13 RX ADMIN — ONDANSETRON 4 MG: 2 INJECTION INTRAMUSCULAR; INTRAVENOUS at 03:37

## 2023-02-13 RX ADMIN — MOMETASONE FUROATE AND FORMOTEROL FUMARATE DIHYDRATE 2 PUFF: 200; 5 AEROSOL RESPIRATORY (INHALATION) at 07:18

## 2023-02-13 RX ADMIN — SODIUM CHLORIDE, PRESERVATIVE FREE 10 ML: 5 INJECTION INTRAVENOUS at 03:43

## 2023-02-13 RX ADMIN — OXYCODONE AND ACETAMINOPHEN 1 TABLET: 5; 325 TABLET ORAL at 20:11

## 2023-02-13 RX ADMIN — SODIUM CHLORIDE, PRESERVATIVE FREE 10 ML: 5 INJECTION INTRAVENOUS at 09:39

## 2023-02-13 RX ADMIN — LOSARTAN POTASSIUM 50 MG: 25 TABLET, FILM COATED ORAL at 09:29

## 2023-02-13 RX ADMIN — ONDANSETRON 4 MG: 2 INJECTION INTRAMUSCULAR; INTRAVENOUS at 20:11

## 2023-02-13 RX ADMIN — DOCUSATE SODIUM 100 MG: 100 CAPSULE, LIQUID FILLED ORAL at 21:34

## 2023-02-13 RX ADMIN — POLYETHYLENE GLYCOL 3350 17 G: 17 POWDER, FOR SOLUTION ORAL at 13:47

## 2023-02-13 RX ADMIN — LEVOTHYROXINE SODIUM 75 MCG: 0.07 TABLET ORAL at 06:43

## 2023-02-13 RX ADMIN — TRAZODONE HYDROCHLORIDE 50 MG: 50 TABLET ORAL at 21:34

## 2023-02-13 RX ADMIN — DEXTROSE MONOHYDRATE 250 ML/HR: 100 INJECTION, SOLUTION INTRAVENOUS at 06:39

## 2023-02-13 RX ADMIN — APIXABAN 2.5 MG: 2.5 TABLET, FILM COATED ORAL at 21:34

## 2023-02-13 RX ADMIN — GLUCAGON 1 MG: KIT at 06:18

## 2023-02-13 RX ADMIN — CARVEDILOL 25 MG: 12.5 TABLET, FILM COATED ORAL at 09:29

## 2023-02-13 RX ADMIN — TIOTROPIUM BROMIDE INHALATION SPRAY 2 PUFF: 3.12 SPRAY, METERED RESPIRATORY (INHALATION) at 07:17

## 2023-02-13 RX ADMIN — TRAMADOL HYDROCHLORIDE 50 MG: 50 TABLET ORAL at 11:20

## 2023-02-13 RX ADMIN — AMIODARONE HYDROCHLORIDE 200 MG: 200 TABLET ORAL at 09:30

## 2023-02-13 ASSESSMENT — PAIN SCALES - GENERAL
PAINLEVEL_OUTOF10: 0
PAINLEVEL_OUTOF10: 0
PAINLEVEL_OUTOF10: 9
PAINLEVEL_OUTOF10: 9
PAINLEVEL_OUTOF10: 7
PAINLEVEL_OUTOF10: 10

## 2023-02-13 ASSESSMENT — PAIN DESCRIPTION - DESCRIPTORS
DESCRIPTORS: SORE
DESCRIPTORS: ACHING

## 2023-02-13 ASSESSMENT — PAIN DESCRIPTION - ORIENTATION
ORIENTATION: RIGHT

## 2023-02-13 ASSESSMENT — PAIN DESCRIPTION - LOCATION
LOCATION: ARM

## 2023-02-13 NOTE — PROGRESS NOTES
Pt is c/o 7/10 pain of her right arm that has been hurting her since receiving vaccines on 02/06. Offered pt tylenol since previous note indicates that pt refused percocet. Pt refused tylenol. Applied ice pack on Left arm instead. Pt is asking for IV pain medication. Explained that she does not have any prescribed and tylenol will work for muscle pain. Pt is refusing PO pain meds. Pt is getting some relief from ice pack.

## 2023-02-13 NOTE — PROGRESS NOTES
Occupational Therapy  Facility/Department: [address] Occupational Therapy Evaluation       Date: 23  Patient Name: Angel Gage       Room: 209/94  MRN: [id number]  Account: [de-identified]   : 1955  (74 y.o.) Gender: female                Restrictions       Subjective             Vitals  Temp: 96.9 °F (36.1 °C)  Heart Rate: 74  Resp: 18  BP: (!) 140/60  Oxygen Therapy  SpO2: 98 %  Level of Consciousness: Alert (0)    Objective             ROM     Fine Motor Skills          Hand Assessment     Functional Mobility             Goals       Assessment             Therapy Time   Individual Concurrent Group Co-treatment   Time In           Time Out           Minutes                   Bharati Smith, OT

## 2023-02-13 NOTE — PROGRESS NOTES
Attempt to evaluate pt this morning and she states she feels too weak and her R UE is hurting to bad to participate will return to attempt evaluation.   Dragan Mcintosh, PT, DPT

## 2023-02-13 NOTE — PROGRESS NOTES
HOSPITALIST PROGRESS NOTE  Fabian Jacobs MD, [address]          Daily Progress Note: 2/13/2023      Subjective:     Patient is alert and oriented x4. Noted to have significant hyperglycemia this a.m. with blood sugar down to 30s to 50s. Required 1 dose of D50 with significant improvement and patient continues to have nausea/vomiting. Continues to be significantly weak and continues to have nausea and vomiting. Has not tolerated an oral diet over the last 2 days thus far. KUB relieving significant constipation with patient having no BM over the last 3 days. Status post hemodialysis on 2/11/2023. Repeat Hemodialysis today per nephrology. No overnight fever/chills, chest pain, shortness of breath noted. Counseled on continued treatment with hemodialysis as well as aggressive bowel regimen.       Medications reviewed  Current Facility-Administered Medications   Medication Dose Route Frequency    traMADol (ULTRAM) tablet 50 mg  50 mg Oral Q6H PRN    glucose chewable tablet 16 g  4 tablet Oral PRN    dextrose bolus 10% 125 mL  125 mL IntraVENous PRN    Or    dextrose bolus 10% 250 mL  250 mL IntraVENous PRN    glucagon (rDNA) injection 1 mg  1 mg SubCUTAneous PRN    dextrose 10 % infusion   IntraVENous Continuous PRN    dextrose 10 % infusion   IntraVENous Continuous    docusate sodium (COLACE) capsule 100 mg  100 mg Oral BID    polyethylene glycol (GLYCOLAX) packet 17 g  17 g Oral Daily    ondansetron (ZOFRAN) injection 4 mg  4 mg IntraVENous Q8H PRN    oxyCODONE-acetaminophen (PERCOCET) 5-325 MG per tablet 1 tablet  1 tablet Oral Q6H PRN    albuterol sulfate HFA (PROVENTIL;VENTOLIN;PROAIR) 108 (90 Base) MCG/ACT inhaler 1 puff  1 puff Inhalation Q4H PRN    aluminum & magnesium hydroxide-simethicone (MAALOX) 200-200-20 MG/5ML suspension 10 mL  10 mL Oral Q6H PRN    amiodarone (CORDARONE) tablet 200 mg  200 mg Oral Daily amLODIPine (NORVASC) tablet 10 mg  10 mg Oral Daily    carvedilol (COREG) tablet 25 mg  25 mg Oral BID WC    cinacalcet (SENSIPAR) tablet 30 mg  30 mg Oral Daily    clopidogrel (PLAVIX) tablet 75 mg  75 mg Oral Daily    apixaban (ELIQUIS) tablet 2.5 mg  2.5 mg Oral BID    famotidine (PEPCID) tablet 10 mg  10 mg Oral Daily    fluticasone (FLONASE) 50 MCG/ACT nasal spray 2 spray  2 spray Each Nostril Daily    hyoscyamine (OSCIMIN) dispersible tablet 125 mcg  125 mcg SubLINGual Q4H PRN    levothyroxine (SYNTHROID) tablet 75 mcg  75 mcg Oral QAM AC    losartan (COZAAR) tablet 50 mg  50 mg Oral Daily    meclizine (ANTIVERT) tablet 12.5 mg  12.5 mg Oral Q6H PRN    montelukast (SINGULAIR) tablet 10 mg  10 mg Oral Daily    ondansetron (ZOFRAN-ODT) disintegrating tablet 8 mg  8 mg Oral Q8H PRN    sevelamer (RENVELA) tablet 800 mg  800 mg Oral TID WC    atorvastatin (LIPITOR) tablet 10 mg  10 mg Oral Nightly    traZODone (DESYREL) tablet 50 mg  50 mg Oral Nightly    mometasone-formoterol (DULERA) 200-5 MCG/ACT inhaler 2 puff  2 puff Inhalation BID    And    tiotropium (SPIRIVA RESPIMAT) 2.5 MCG/ACT inhaler 2 puff  2 puff Inhalation Daily    sodium chloride flush 0.9 % injection 5-40 mL  5-40 mL IntraVENous Q8H    acetaminophen (TYLENOL) tablet 650 mg  650 mg Oral Q4H PRN    Or    acetaminophen (TYLENOL) suppository 650 mg  650 mg Rectal Q4H PRN    calcitRIOL (ROCALTROL) capsule 0.5 mcg  0.5 mcg Oral Once per day on Sun Tue Wed Thu Sat       Review of Systems:   Pertinent items are noted in HPI.     Objective:   Physical Exam:     BP (!) 155/55   Pulse 66   Temp 96.8 °F (36 °C) (Temporal)   Resp 18   Ht 5' 1\" (1.549 m)   Wt 144 lb (65.3 kg)   SpO2 98%   BMI 27.21 kg/m²       Patient Vitals for the past 8 hrs:   Temp Pulse Resp BP SpO2   02/13/23 1202 96.8 °F (36 °C) 66 18 (!) 155/55 98 %   02/13/23 0819 97 °F (36.1 °C) 81 18 (!) 190/65 100 %   02/13/23 0723 -- 63 18 -- 97 %   02/13/23 0718 -- 63 18 -- 97 %          Temp (24hrs), Av.2 °F (36.2 °C), Min:96.8 °F (36 °C), Max:98.1 °F (36.7 °C)    No intake/output data recorded. No intake/output data recorded. General:  Alert, cooperative, no distress, appears stated age. Lungs:   Clear to auscultation bilaterally. Chest wall:  No tenderness or deformity. Heart:  Regular rate and rhythm, S1, S2 normal, no murmur, click, rub or gallop. Abdomen:   Soft, non-tender. Bowel sounds normal. No masses,  No organomegaly. Extremities: Extremities normal, atraumatic, no cyanosis or edema. Pulses: 2+ and symmetric all extremities. Skin: Skin color, texture, turgor normal. No rashes or lesions   Neurologic: No gross sensory or motor deficits     Data Review:       Recent Days:  Recent Labs     23  0525 23  0336   WBC 7.3 5.1   HGB 12.0 11.5*   HCT 36.8 36.8    267     Recent Labs     23  0525 23  0336    132*   K 4.3 5.4   CL 99* 96*   CO2 22 21   BUN 36* 63*   ALT 28  --      No results for input(s): PH, PCO2, PO2, HCO3, FIO2 in the last 72 hours.     24 Hour Results:  Recent Results (from the past 24 hour(s))   Basic Metabolic Panel    Collection Time: 23  3:36 AM   Result Value Ref Range    Sodium 132 (L) 136 - 145 mmol/L    Potassium 5.4 3.5 - 5.5 mmol/L    Chloride 96 (L) 100 - 111 mmol/L    CO2 21 21 - 32 mmol/L    Anion Gap 15 3.0 - 18.0 mmol/L    Glucose 35 (LL) 74 - 99 mg/dL    BUN 63 (H) 7 - 18 mg/dL    Creatinine 10.10 (H) 0.60 - 1.30 mg/dL    Bun/Cre Ratio 6 (L) 12 - 20      Est, Glom Filt Rate 4 (L) >60 ml/min/1.73m2    Calcium 7.9 (L) 8.5 - 10.1 mg/dL   CBC with Auto Differential    Collection Time: 23  3:36 AM   Result Value Ref Range    WBC 5.1 4.6 - 13.2 K/uL    RBC 3.87 (L) 4.20 - 5.30 M/uL    Hemoglobin 11.5 (L) 12.0 - 16.0 g/dL    Hematocrit 36.8 35.0 - 45.0 %    MCV 95.1 78.0 - 100.0 FL    MCH 29.7 24.0 - 34.0 PG    MCHC 31.3 31.0 - 37.0 g/dL    RDW 17.2 (H) 11.6 - 14.5 %    Platelets 144 570 - 259 K/uL MPV 9.4 9.2 - 11.8 FL    Nucleated RBCs 0.0 0.0  WBC    nRBC 0.00 0.00 - 0.01 K/uL    Seg Neutrophils 62 40 - 73 %    Lymphocytes 31 21 - 52 %    Monocytes 3 3 - 10 %    Eosinophils % 3 0 - 5 %    Basophils 1 0 - 2 %    Immature Granulocytes 0 0 - 0.5 %    Segs Absolute 3.1 1.8 - 8.0 K/UL    Absolute Lymph # 1.6 0.9 - 3.6 K/UL    Absolute Mono # 0.2 0.05 - 1.2 K/UL    Absolute Eos # 0.2 0.0 - 0.4 K/UL    Basophils Absolute 0.1 0.0 - 0.1 K/UL    Absolute Immature Granulocyte 0.0 0.00 - 0.04 K/UL    Differential Type AUTOMATED     POCT Glucose    Collection Time: 02/13/23  6:03 AM   Result Value Ref Range    POC Glucose 39 (LL) 70 - 110 mg/dL    Performed by: Carlson Criselda    POCT Glucose    Collection Time: 02/13/23  6:28 AM   Result Value Ref Range    POC Glucose 73 70 - 110 mg/dL    Performed by: Carlson Criselda    POCT Glucose    Collection Time: 02/13/23  7:31 AM   Result Value Ref Range    POC Glucose 194 (H) 70 - 110 mg/dL    Performed by: Aaron Evans    POCT Glucose    Collection Time: 02/13/23 12:32 PM   Result Value Ref Range    POC Glucose 52 (LL) 70 - 110 mg/dL    Performed by: Aaron Evans    POCT Glucose    Collection Time: 02/13/23 12:37 PM   Result Value Ref Range    POC Glucose 52 (LL) 70 - 110 mg/dL    Performed by: Walkermaddison Evans    POCT Glucose    Collection Time: 02/13/23 12:55 PM   Result Value Ref Range    POC Glucose 77 70 - 110 mg/dL    Performed by: Aaron Evans            Assessment/Plan:     Non-cardiogenic fluid overload/ESRD dependent on dialysis  Secondary to missing sessions of dialysis. Nephrology consulted and patient received dialysis on 2/11/2023 with continued significant uremic symptoms of nausea and vomiting. Dialysis per nephrology to be repeated today. Abdominal pain/constipation  Diarrhea resolved at this time likely reaction to shingles and booster for COVID-vaccine. Patient now with constipation. No BM over the last 72 hours.   KUB with constipation and will start on bowel regimen with Colace and MiraLAX and soapsuds enemas if needed. Hypoglycemia  No significant history of type 2 diabetes. Status post D50 x1 dose with improvement. No significant altered mentation or encephalopathy noted. Likely secondary to ESRD accompanied by significant nausea/vomiting for the last several days. Cont' on hypoglycemic protocol and monitor closely. History of atrial fibrillation  Controlled on amiodarone and currently on Eliquis. Hypertension  Continue Cozaar, Norvasc, Coreg. Hx of DVT   on eliquis. Hypothyroidism   levothyroxine        Care Plan discussed with: Patient/Family and Nurse    Total time spent with patient: 30 minutes. With greater than 50% spent in coordination of care and counseling.     Brett Parker MD

## 2023-02-13 NOTE — CONSULTS
Consult Note    Assessment:   - ESRD with Hx of non-compliance with OP Treatments   - weakness and Fatigue S/P Vaccination  with Nausea and vomiting   - Anemia of CKD on target   - 2HPT on calcitriol and sensipar   - HTN     Recommendations:   - HD today and Continue MWF , volume status looks good , will plan on 2 L UF with HD   - Hold on EPO   - Renal diet and continue with Binder   - Hypoglycemia management per primary team   - Discussed compliance with OP Dialysis treatments   - Follow labs       Consult requested by: Sukhi Bell MD    ADMIT DATE: 2/10/2023  CONSULT DATE: February 13, 2023                 Admission diagnosis: weakness with nausea and vomiting     Reason for Nephrology Consultation: ESRD     HPI: Violet Kohler is a 74 y.o. female Black / Alina Gene who is being admitted with sever weakness , nausea and vomiting with Rt shoulder pain after she received [place]ingle / Aparna Peer , she has Hx of ESRD on HD MWF ,  not compliant with her treatments . Today she still feel sick with nausea and vomiting not tolerating PO , she still has Rt shoulder pain , she denies any LE edema / SOB .        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    Dialysis patient [facility])     M/W/F    Diverticular disease of colon 08/21/2013    Dyspepsia and other specified disorders of function of stomach     stomach ulcer    Elevated troponin 10/21/2019    Encounter for cholecystectomy 05/06/2021 7/2020    Essential hypertension     GERD (gastroesophageal reflux disease)     History of chemotherapy     History of renal transplant 08/21/2013    (LD 2/12/2002)    HLD (hyperlipidemia)     Hypercholesteremia     Hypertension     Hypothyroidism 03/23/2022    Kidney transplant rejection     Menopause     Migraine     Obesity     Osteoporosis     Pancreatitis 08/28/2022    Pyelonephritis 07/13/2020    Recurrent urinary tract infection 09/27/2016    Renal cyst 2002    kidney transplant     Spontaneous bacterial peritonitis ([address] 01/16/2019    Ulcer       Past Surgical History:   Procedure Laterality Date    CHOLECYSTECTOMY  02/2021    COLONOSCOPY  05/13/2022    COLONOSCOPY      Dr Mikayla Spivey  5yrs ago    COLONOSCOPY      with Dr. Lorenzo Spivey  5 yrs ago    GI      ulcer    HEENT      sinus surg    KIDNEY TRANSPLANT      OTHER SURGICAL HISTORY  02/21/2022    SIGMOIDOSCOPY, FLEXIBLE;  Surgeon: Harmeet Ye MD    TRANSPLANT      kidney transplant 2002    VASCULAR SURGERY      shunt in prep for dialysis       Social History     Socioeconomic History    Marital status:      Spouse name: Not on file    Number of children: Not on file    Years of education: Not on file    Highest education level: Not on file   Occupational History    Not on file   Tobacco Use    Smoking status: Never    Smokeless tobacco: Never   Substance and Sexual Activity    Alcohol use: No    Drug use: No    Sexual activity: Not on file   Other Topics Concern    Not on file   Social History Narrative    Not on file     Social Determinants of Health     Financial Resource Strain: High Risk    Difficulty of Paying Living Expenses: Very hard   Food Insecurity: Food Insecurity Present    Worried About Running Out of Food in the Last Year: Sometimes true    Ran Out of Food in the Last Year: Sometimes true   Transportation Needs: Unmet Transportation Needs    Lack of Transportation (Medical): Yes    Lack of Transportation (Non-Medical): Yes   Physical Activity: Not on file Stress: Not on file   Social Connections: Not on file   Intimate Partner Violence: Not on file   Housing Stability: Unknown    Unable to Pay for Housing in the Last Year: Not on file    Number of [place] in the Last Year: Not on file    Unstable Housing in the Last Year: No       Family History   Problem Relation Age of Onset    Diabetes Mother     Rheum Arthritis Mother     Hypertension Father     Diabetes Father     Thyroid Disease Sister     Colon Polyps Brother      Allergies   Allergen Reactions    Aspirin Rash     Other reaction(s): Unknown (comments)    Bromfenac Rash     Other reaction(s): Unknown (comments)    Cefepime Other (See Comments)     Neurological reaction    Ceftriaxone Rash    Copper Rash    Ibuprofen Rash     Other reaction(s): Unknown (comments)    Ketorolac Tromethamine Rash     Other reaction(s): Unknown (comments)    Nabumetone Rash     Other reaction(s): Unknown (comments)    Rifampin Rash     Other reaction(s): Unknown (comments)    Sulfamethoxazole-Trimethoprim Rash     Other reaction(s): Unknown (comments)    Vancomycin Rash     Other reaction(s): Unknown (comments)  Pt reports causes itching, takes benadryl prior to use. Pt denies anaphylaxis.     Requires benadryl with each vancomycin dose        Home Medications:   @[facility]@    Current Inpatient Medications:     Current Facility-Administered Medications   Medication Dose Route Frequency    traMADol (ULTRAM) tablet 50 mg  50 mg Oral Q6H PRN    glucose chewable tablet 16 g  4 tablet Oral PRN    dextrose bolus 10% 125 mL  125 mL IntraVENous PRN    Or    dextrose bolus 10% 250 mL  250 mL IntraVENous PRN    glucagon (rDNA) injection 1 mg  1 mg SubCUTAneous PRN    dextrose 10 % infusion   IntraVENous Continuous PRN    dextrose 10 % infusion   IntraVENous Continuous    docusate sodium (COLACE) capsule 100 mg  100 mg Oral BID    polyethylene glycol (GLYCOLAX) packet 17 g  17 g Oral Daily    ondansetron (ZOFRAN) injection 4 mg  4 mg IntraVENous Q8H PRN    oxyCODONE-acetaminophen (PERCOCET) 5-325 MG per tablet 1 tablet  1 tablet Oral Q6H PRN    albuterol sulfate HFA (PROVENTIL;VENTOLIN;PROAIR) 108 (90 Base) MCG/ACT inhaler 1 puff  1 puff Inhalation Q4H PRN    aluminum & magnesium hydroxide-simethicone (MAALOX) 200-200-20 MG/5ML suspension 10 mL  10 mL Oral Q6H PRN    amiodarone (CORDARONE) tablet 200 mg  200 mg Oral Daily    amLODIPine (NORVASC) tablet 10 mg  10 mg Oral Daily    carvedilol (COREG) tablet 25 mg  25 mg Oral BID WC    cinacalcet (SENSIPAR) tablet 30 mg  30 mg Oral Daily    clopidogrel (PLAVIX) tablet 75 mg  75 mg Oral Daily    apixaban (ELIQUIS) tablet 2.5 mg  2.5 mg Oral BID    famotidine (PEPCID) tablet 10 mg  10 mg Oral Daily    fluticasone (FLONASE) 50 MCG/ACT nasal spray 2 spray  2 spray Each Nostril Daily    hyoscyamine (OSCIMIN) dispersible tablet 125 mcg  125 mcg SubLINGual Q4H PRN    levothyroxine (SYNTHROID) tablet 75 mcg  75 mcg Oral QAM AC    losartan (COZAAR) tablet 50 mg  50 mg Oral Daily    meclizine (ANTIVERT) tablet 12.5 mg  12.5 mg Oral Q6H PRN    montelukast (SINGULAIR) tablet 10 mg  10 mg Oral Daily    ondansetron (ZOFRAN-ODT) disintegrating tablet 8 mg  8 mg Oral Q8H PRN    sevelamer (RENVELA) tablet 800 mg  800 mg Oral TID WC    atorvastatin (LIPITOR) tablet 10 mg  10 mg Oral Nightly    traZODone (DESYREL) tablet 50 mg  50 mg Oral Nightly    mometasone-formoterol (DULERA) 200-5 MCG/ACT inhaler 2 puff  2 puff Inhalation BID    And    tiotropium (SPIRIVA RESPIMAT) 2.5 MCG/ACT inhaler 2 puff  2 puff Inhalation Daily    sodium chloride flush 0.9 % injection 5-40 mL  5-40 mL IntraVENous Q8H    acetaminophen (TYLENOL) tablet 650 mg  650 mg Oral Q4H PRN    Or    acetaminophen (TYLENOL) suppository 650 mg  650 mg Rectal Q4H PRN    calcitRIOL (ROCALTROL) capsule 0.5 mcg  0.5 mcg Oral Once per day on Sun Tue Wed Thu Sat       Review of Systems:   No fever or chills. No sore throat. No cough or hemoptysis.  No shortness of breath or chest pain. No orthopnea or paroxysmal nocturnal dyspnea. Good appetite. + nausea, vomiting, No abdominal pain, melena or hematochezia. No constipation or diarrhea. No dysuria, no gross hematuria of voiding difficulties. No ankle swelling, no joint paints. No muscle aches. No skin changes. No dizziness or lightheadedness. No headaches. + Rt shoulder pain       Physical Assessment:     Vitals:    02/12/23 2352 02/13/23 0718 02/13/23 0723 02/13/23 0819   BP: (!) 171/67   (!) 190/65   Pulse: 87 63 63 81   Resp: 17 18 18 18   Temp: 97.1 °F (36.2 °C)   97 °F (36.1 °C)   TempSrc: Temporal   Temporal   SpO2: 98% 97% 97% 100%   Weight:       Height:         [unfilled]  Admission weight: Weight: 144 lb (65.3 kg) (02/10/23 2104)    No intake or output data in the 24 hours ending 02/13/23 1151    Patient is in no apparent distress but looks weak and tired   HEENT: Head is normocephalic and atraumatic. Pupils are round, equal, reactive to light. Sclerae are anicteric. Oropharynx clear. Neck: no cervical lymphadenopathy or thyromegaly. Lungs: good air entry, clear to auscultation bilaterally. Trachea at the midline. Cardiovascular system: S1, S2, regular rate and rhythm. No murmurs, gallops or rubs. No jvd. Carotid upstroke 2 + bilaterally. Abdomen: soft, non tender, non distended. Positive bowel sounds. No hepatosplenomegaly. No abdominal bruits. Extremities: no clubbing, cyanosis or edema. Integumentary: skin is grossly intact. Neurologic: Alert, oriented time three. Cooperative and appropriate.   Lt AVF       Data Review:    Labs: Results:       Chemistry Recent Labs     02/10/23  1155 02/11/23  0525 02/13/23  0336   * 137 132*   K 5.9* 4.3 5.4    99* 96*   CO2 16* 22 21   BUN 95* 36* 63*   GLOB 3.6 3.8  --          CBC w/Diff Recent Labs     02/10/23  1155 02/11/23  0525 02/13/23  0336   WBC 7.9 7.3 5.1   RBC 3.77* 3.95* 3.87*   HGB 11.0* 12.0 11.5*   HCT 34.9* 36.8 36.8    250 267         Iron/Ferritin No results for input(s): IRON in the last 72 hours. Invalid input(s): TIBCCALC   PTH/VIT D No results for input(s): PTH in the last 72 hours.     Invalid input(s): Manuel Guillen MD  [facility]  February 13, 2023

## 2023-02-13 NOTE — PROGRESS NOTES
DR. Richi Hair office contacted and made aware of pt's inpatient status.  Voicemail left for the HD nurse as well

## 2023-02-13 NOTE — PROGRESS NOTES
Hypoglycemic this a.m--sugars down to 35, not on insulins or oral antidiabetic--hypoglycemic protocol initiated--consider dietician consult.

## 2023-02-13 NOTE — DIALYSIS
Pre dialysis report from primary nurse No Liu LPN. AVF bruit and thrill noted, no s/s of infection / disinfected per P and P.  / cannulate 16 gg needles without difficulty. Rested and watched tv much of treatment / post treatment, all possible blood returned / hemostasis. Bruit and thrill noted.

## 2023-02-13 NOTE — PROGRESS NOTES
0700-Report received from off going nurse. Assumed care of patient. Resting in bed. NAD. Bed in lowest position. CBWR.     0929-Patient only wanted to take BP meds. She stated she would take the other meds later. 1237-Patient requested blood glucose to be checked. BG 52. 2 4oz apple juice given. Patient tolerated well. 1255-Rechecked BG 77.     1412-Patient off unit to dialysis. 1817-Patient returned to unit from dialysis. NAD. Changed brief of incontinent urine. BG 58. Asymptomatic. Set patient up for supper. Will continue to monitor. 1839-Rechecked BG 68. Patient stated she will finish drinking her smoothie.

## 2023-02-14 VITALS
DIASTOLIC BLOOD PRESSURE: 62 MMHG | SYSTOLIC BLOOD PRESSURE: 169 MMHG | HEIGHT: 61 IN | TEMPERATURE: 98 F | WEIGHT: 144 LBS | OXYGEN SATURATION: 95 % | RESPIRATION RATE: 16 BRPM | HEART RATE: 68 BPM | BODY MASS INDEX: 27.19 KG/M2

## 2023-02-14 PROBLEM — E16.2 HYPOGLYCEMIA: Status: ACTIVE | Noted: 2023-02-14

## 2023-02-14 LAB
GLUCOSE BLD STRIP.AUTO-MCNC: 122 MG/DL (ref 70–110)
GLUCOSE BLD STRIP.AUTO-MCNC: 91 MG/DL (ref 70–110)
PERFORMED BY:: ABNORMAL
PERFORMED BY:: NORMAL

## 2023-02-14 PROCEDURE — 96376 TX/PRO/DX INJ SAME DRUG ADON: CPT

## 2023-02-14 PROCEDURE — 6370000000 HC RX 637 (ALT 250 FOR IP): Performed by: INTERNAL MEDICINE

## 2023-02-14 PROCEDURE — 94761 N-INVAS EAR/PLS OXIMETRY MLT: CPT

## 2023-02-14 PROCEDURE — 2580000003 HC RX 258: Performed by: INTERNAL MEDICINE

## 2023-02-14 PROCEDURE — G0378 HOSPITAL OBSERVATION PER HR: HCPCS

## 2023-02-14 PROCEDURE — 94640 AIRWAY INHALATION TREATMENT: CPT

## 2023-02-14 PROCEDURE — 1100000000 HC RM PRIVATE

## 2023-02-14 PROCEDURE — 97161 PT EVAL LOW COMPLEX 20 MIN: CPT

## 2023-02-14 PROCEDURE — 82962 GLUCOSE BLOOD TEST: CPT

## 2023-02-14 PROCEDURE — 6360000002 HC RX W HCPCS: Performed by: INTERNAL MEDICINE

## 2023-02-14 RX ORDER — TRAMADOL HYDROCHLORIDE 50 MG/1
50 TABLET ORAL EVERY 8 HOURS PRN
Qty: 10 TABLET | Refills: 0 | Status: SHIPPED | OUTPATIENT
Start: 2023-02-14 | End: 2023-02-19

## 2023-02-14 RX ORDER — PSEUDOEPHEDRINE HCL 30 MG
100 TABLET ORAL 2 TIMES DAILY
Qty: 60 CAPSULE | Refills: 0 | Status: ON HOLD | OUTPATIENT
Start: 2023-02-14 | End: 2023-03-16

## 2023-02-14 RX ORDER — TRAMADOL HYDROCHLORIDE 50 MG/1
50 TABLET ORAL EVERY 8 HOURS PRN
Qty: 10 TABLET | Refills: 0 | Status: SHIPPED | OUTPATIENT
Start: 2023-02-14 | End: 2023-02-14 | Stop reason: SDUPTHER

## 2023-02-14 RX ADMIN — AMIODARONE HYDROCHLORIDE 200 MG: 200 TABLET ORAL at 08:16

## 2023-02-14 RX ADMIN — SEVELAMER CARBONATE 800 MG: 800 TABLET, FILM COATED ORAL at 08:16

## 2023-02-14 RX ADMIN — ONDANSETRON 4 MG: 2 INJECTION INTRAMUSCULAR; INTRAVENOUS at 04:08

## 2023-02-14 RX ADMIN — CALCITRIOL CAPSULES 0.25 MCG 0.5 MCG: 0.25 CAPSULE ORAL at 08:15

## 2023-02-14 RX ADMIN — CARVEDILOL 25 MG: 12.5 TABLET, FILM COATED ORAL at 08:16

## 2023-02-14 RX ADMIN — CLOPIDOGREL BISULFATE 75 MG: 75 TABLET ORAL at 08:16

## 2023-02-14 RX ADMIN — LOSARTAN POTASSIUM 50 MG: 25 TABLET, FILM COATED ORAL at 08:17

## 2023-02-14 RX ADMIN — SODIUM CHLORIDE, PRESERVATIVE FREE 10 ML: 5 INJECTION INTRAVENOUS at 00:06

## 2023-02-14 RX ADMIN — AMLODIPINE BESYLATE 10 MG: 5 TABLET ORAL at 08:16

## 2023-02-14 RX ADMIN — SEVELAMER CARBONATE 800 MG: 800 TABLET, FILM COATED ORAL at 11:09

## 2023-02-14 RX ADMIN — LEVOTHYROXINE SODIUM 75 MCG: 0.07 TABLET ORAL at 06:32

## 2023-02-14 RX ADMIN — MONTELUKAST 10 MG: 10 TABLET, FILM COATED ORAL at 08:16

## 2023-02-14 RX ADMIN — CINACALCET HYDROCHLORIDE 30 MG: 30 TABLET, FILM COATED ORAL at 08:16

## 2023-02-14 RX ADMIN — FAMOTIDINE 10 MG: 20 TABLET, FILM COATED ORAL at 08:16

## 2023-02-14 RX ADMIN — MOMETASONE FUROATE AND FORMOTEROL FUMARATE DIHYDRATE 2 PUFF: 200; 5 AEROSOL RESPIRATORY (INHALATION) at 07:55

## 2023-02-14 RX ADMIN — TIOTROPIUM BROMIDE INHALATION SPRAY 2 PUFF: 3.12 SPRAY, METERED RESPIRATORY (INHALATION) at 07:53

## 2023-02-14 RX ADMIN — ONDANSETRON 4 MG: 2 INJECTION INTRAMUSCULAR; INTRAVENOUS at 12:56

## 2023-02-14 RX ADMIN — APIXABAN 2.5 MG: 2.5 TABLET, FILM COATED ORAL at 08:16

## 2023-02-14 RX ADMIN — OXYCODONE AND ACETAMINOPHEN 1 TABLET: 5; 325 TABLET ORAL at 04:08

## 2023-02-14 RX ADMIN — SODIUM CHLORIDE, PRESERVATIVE FREE 10 ML: 5 INJECTION INTRAVENOUS at 08:17

## 2023-02-14 RX ADMIN — OXYCODONE AND ACETAMINOPHEN 1 TABLET: 5; 325 TABLET ORAL at 11:09

## 2023-02-14 ASSESSMENT — PAIN SCALES - GENERAL
PAINLEVEL_OUTOF10: 8
PAINLEVEL_OUTOF10: 6
PAINLEVEL_OUTOF10: 3
PAINLEVEL_OUTOF10: 5
PAINLEVEL_OUTOF10: 6

## 2023-02-14 ASSESSMENT — PAIN DESCRIPTION - LOCATION
LOCATION: ARM
LOCATION: GENERALIZED
LOCATION: ARM
LOCATION: ARM
LOCATION: GENERALIZED

## 2023-02-14 ASSESSMENT — PAIN DESCRIPTION - DESCRIPTORS
DESCRIPTORS: SORE
DESCRIPTORS: ACHING
DESCRIPTORS: SORE
DESCRIPTORS: ACHING

## 2023-02-14 ASSESSMENT — PULMONARY FUNCTION TESTS
PEFR_L/MIN: 18
PEFR_L/MIN: 18

## 2023-02-14 ASSESSMENT — PAIN DESCRIPTION - ORIENTATION
ORIENTATION: RIGHT

## 2023-02-14 NOTE — CARE COORDINATION
Case Management Assessment  Initial Evaluation    Date/Time of Evaluation: 2/14/2023 11:55 AM  Assessment Completed by: Sebas Puri RN    If patient is discharged prior to next notation, then this note serves as note for discharge by case management. Patient Name: Jovani Ding                   YOB: 1955  Diagnosis: ESRD                   Date / Time: 2/10/2023 10:24 AM    Patient Admission Status: Observation   Readmission Risk (Low < 19, Mod (19-27), High > 27): No data recorded  Current PCP: Violeta Grigsby MD  PCP verified by CM? Yes    Chart Reviewed: Yes      History Provided by: Patient  Patient Orientation: Alert and Oriented    Patient Cognition: Alert    Hospitalization in the last 30 days (Readmission):  No    If yes, Readmission Assessment in CM Navigator will be completed. Advance Directives:      Code Status: Full Code   Patient's Primary Decision Maker is: Legal Next of Kin      Discharge Planning:    Patient lives with: Family Members Type of Home: House  Primary Care Giver: Self  Patient Support Systems include: Children, Family Members   Current Financial resources: Medicare  Current community resources: None  Current services prior to admission: None            Current DME:              Type of Home Care services:  None    ADLS  Prior functional level: Independent in ADLs/IADLs  Current functional level: Independent in ADLs/IADLs    PT AM-PAC:   /24  OT AM-PAC:   /24    Family can provide assistance at DC: Yes  Would you like Case Management to discuss the discharge plan with any other family members/significant others, and if so, who?  No  Plans to Return to Present Housing: Yes  Other Identified Issues/Barriers to RETURNING to current housing: none   Potential Assistance needed at discharge: N/A            Potential DME:    Patient expects to discharge to: [address] [place] [address] for transportation at discharge: Family    Financial    Payor: Delbert Pastor / Plan: Deng Chamberlain CHOICE-PPO MEDICARE / Product Type: *No Product type* /     Does insurance require precert for SNF: No    Potential assistance Purchasing Medications: No  Meds-to-Beds request:      No Pharmacies Listed    Notes:    Factors facilitating achievement of predicted outcomes: none    Barriers to discharge: none    Additional Case Management Notes: Discharge Planning/Transition of Care- RUR- NA. Teach back and Medication Reconciliation will be completed. Nurse will make follow-up appointments. Mrs. Keagan Johnson was interviewed at the bedside. She is alert and oriented, and will return back home at discharge. The Plan for Transition of Care is related to the following treatment goals of ESRD    IF APPLICABLE: The Patient and/or patient representative Zuleika Posada and her family were provided with a choice of provider and agrees with the discharge plan. Freedom of choice list with basic dialogue that supports the patient's individualized plan of care/goals and shares the quality data associated with the providers was provided to: Patient   Patient Representative Name:       The Patient and/or Patient Representative Agree with the Discharge Plan?  Yes    Loren Randhawa RN  Case Management Department  Ph: [phone] Fax: [phone]

## 2023-02-14 NOTE — PROGRESS NOTES
Physician Progress Note      PATIENT:               Jarred Barone  CSN #:                  [id number]  :                       1955  ADMIT DATE:       2/10/2023 10:24 AM  Oleg Amos DATE:        2023 2:00 PM  RESPONDING  PROVIDER #:        Anup Carney MD          QUERY TEXT:    Dear Dr Sarah Campa  Patient admitted with fluid overload. RD  evaluated  patient  and  notes     severe malnutrition. If possible, please document in progress notes and   discharge summary if you are evaluating and /or treating any of the following: The medical record reflects the following:  Risk Factors: poor appetite since injections;  multiple episodes of   hypoglycemia due to poor intake. Clinical Indicators: Severe malnutrition (23 1850)  Context:  Chronic Illness  Findings of the 6 clinical characteristics of malnutrition:  Energy Intake:  75% or less estimated energy requirements for 1 month or   longer  Weight Loss:  No significant weight loss  Body Fat Loss:  Mild body fat loss Fat Overlying Ribs, Triceps  Muscle Mass Loss:  Severe muscle mass loss Temples (temporalis), Clavicles   (pectoralis & deltoids), Hand (interosseous)  Fluid Accumulation:  Mild (just completed dialysis) Generalized  Treatment: add Nepro   daily; offer smaller snacks  q2-3  hr.    ASPEN Criteria:    https://aspenjournals. onlinelibrary. mayes. com/doi/full/10.1177/029905209417819  5    Thank  you,   Shweta Tinajero RN   CCDS  Options provided:  -- Protein calorie malnutrition severe  -- Other - I will add my own diagnosis  -- Disagree - Not applicable / Not valid  -- Disagree - Clinically unable to determine / Unknown  -- Refer to Clinical Documentation Reviewer    PROVIDER RESPONSE TEXT:    This patient has severe protein calorie malnutrition.     Query created by: Lynn Lau on 2023 1:50 PM      Electronically signed by:  Anup Carney MD 2023 3:03 PM

## 2023-02-14 NOTE — PROGRESS NOTES
Physical Therapy  Facility/Department: [address]  Physical Therapy Initial Assessment and Discharge    Name: Radha Stacy  : [id number]  MRN: [id number]  Date of Service: 2023    Discharge Recommendations:  Home independently   PT Equipment Recommendations  Equipment Needed: No      Patient Diagnosis(es): Fluid overload, ESRD  Past Medical History:  has a past medical history of JULIET (acute kidney injury) ([facility] [address] Anxiety, Arrhythmia, Arthritis, Asthma, Asthma, Burning with urination, Calculus of gallbladder with acute cholecystitis without obstruction, Cholecystitis, Chronic kidney disease, CMV (cytomegalovirus) antibody positive, Colitis, COPD (chronic obstructive pulmonary disease) ([facility] [address] Cystic kidney disease, Dialysis patient [facility]), Diverticular disease of colon, Dyspepsia and other specified disorders of function of stomach, Elevated troponin, Encounter for cholecystectomy, Essential hypertension, GERD (gastroesophageal reflux disease), History of chemotherapy, History of renal transplant, HLD (hyperlipidemia), Hypercholesteremia, Hypertension, Hypothyroidism, Kidney transplant rejection, Menopause, Migraine, Obesity, Osteoporosis, Pancreatitis, Pyelonephritis, Recurrent urinary tract infection, Renal cyst, Spontaneous bacterial peritonitis ([facility] [address] and Ulcer. Past Surgical History:  has a past surgical history that includes other surgical history (2022); Colonoscopy (2022); transplant; vascular surgery; Cholecystectomy (2021); gi; heent; endoscopy visit outpatient; Colonoscopy; Colonoscopy; and Kidney transplant. Assessment   Patient in bed upon entry agreeable to P.T. She comes to sit then to stand without assistance. She ambulates without assistance and then returns to bed. She does not require further P.T. and can return home once medically stable.   Therapy Prognosis: Excellent  Decision Making: Low Complexity  No Skilled PT: Independent with functional mobility   Requires PT Follow-Up: No     Plan   Physcial Therapy Plan  General Plan: Discharge with evaluation only     Restrictions  Restrictions/Precautions  Restrictions/Precautions: None, Up as Tolerated     Subjective   General  Patient assessed for rehabilitation services?: Yes  Response To Previous Treatment: Not applicable  Family / Caregiver Present: No  Follows Commands: Within Functional Limits  Subjective  Subjective: I'm feeling better today but my R arm still hurts         Social/Functional History  Social/Functional History  Lives With: Family  Type of Home: House  Home Layout: One level  Home Access: Stairs to enter with rails  Entrance Stairs - Number of Steps: 4  Entrance Stairs - Rails: Both  Bathroom Shower/Tub: None  Bathroom Toilet: Standard  Bathroom Equipment: None  Bathroom Accessibility: Accessible  Home Equipment: None  Receives Help From: Family  ADL Assistance: Independent  Homemaking Assistance: Independent  Homemaking Responsibilities: Yes  Meal Prep Responsibility: Primary  Laundry Responsibility: Primary  Cleaning Responsibility: Primary  Bill Paying/Finance Responsibility: Primary  Shopping Responsibility: Primary  Dependent Care Responsibility: Primary  Health Care Management: Primary  Ambulation Assistance: Independent  Transfer Assistance: Independent  Active : Yes  Mode of Transportation: Car  Occupation: Unemployed  Vision/Hearing       Cognition   Orientation  Overall Orientation Status: Within Normal Limits  Cognition  Overall Cognitive Status: WNL     Objective   Heart Rate: 68  Heart Rate Source: Monitor  BP: (!) 169/62  BP Location: Right leg  BP Method: Automatic  MAP (Calculated): 98  Resp: 16  SpO2: 95 %  O2 Device: None (Room air)     Observation/Palpation  Posture: Good  Gross Assessment  AROM: Within functional limits  PROM: Within functional limits  Strength:  Within functional limits  Coordination: Within functional limits  Tone: Normal  Sensation: Intact      Bed Mobility Training  Bed Mobility Training: Yes  Overall Level of Assistance: Modified independent  Rolling: Modified independent  Supine to Sit: Modified independent  Scooting: Modified independent  Balance  Sitting: Intact  Standing: Intact  Transfer Training  Transfer Training: Yes  Overall Level of Assistance: Modified independent  Sit to Stand: Modified independent  Stand to Sit: Modified independent  Gait Training  Gait Training: Yes  Gait  Overall Level of Assistance: Modified independent  Distance (ft): 40 Feet  Assistive Device: Other (comment) (no AD)                          OutComes Score                                                  AM-PAC Score   02/14/23 0926   [facility] Basic Mobility - Inpatient    How much help is needed turning from your back to your side while in a flat bed without using bedrails? 4   How much help is needed moving from lying on your back to sitting on the side of a flat bed without using bedrails? 4   How much help is needed moving to and from a bed to a chair? 4   How much help is needed standing up from a chair using your arms? 4   How much help is needed walking in hospital room? 4   How much help is needed climbing 3-5 steps with a railing? 4   AM-[facility] Inpatient Mobility Raw Score  24   AM-[facility] Inpatient T-Scale Score  61.14   Mobility Inpatient CMS 0-100% Score 0   Mobility Inpatient CMS G-Code Modifier  [facility]   Education  Patient Education  Education Given To: Patient  Education Provided: Role of Therapy;Plan of Care;Transfer Training;Energy Conservation; Fall Prevention Strategies  Education Method: Teach Back  Barriers to Learning: None  Education Outcome: Verbalized understanding      Therapy Time   Individual Concurrent Group Co-treatment   Time In  916         Time Out  926         Minutes  [address] [place] [address]

## 2023-02-14 NOTE — DISCHARGE SUMMARY
HOSPITALIST PROGRESS NOTE  Mario De Los Santos MD, [address] [facility]       PATIENT ID: Jonatan Bedoya  MRN: [id number]   YOB: 1955    DATE OF ADMISSION: 2/10/2023 10:24 AM    DATE OF DISCHARGE: 02/14/23    PRIMARY CARE PROVIDER: Jenny Blood MD     ATTENDING PHYSICIAN: Klarissa Puri MD  DISCHARGING PROVIDER: Klarissa Puri MD        CONSULTATIONS: IP CONSULT TO NEPHROLOGY  IP CONSULT TO NEPHROLOGY  IP CONSULT TO DIETITIAN    PROCEDURES/SURGERIES: * No surgery found *    ADMITTING [address] [place] [address] COURSE:      Jonatan Bedoya is a 74 y.o. female who comes to the hospital with complaints of general malaise, reported had missed HD for 2 days due to diarrhea and feeling unwell since Monday after receiving the first dose of shingles vaccine. She started having diarrhea  and nausea, with left arm pain from the injection. She reported poor appetite and could not get out of bed very well to ambulate. She will be admitted for emergent hemodialysis and further evaluation and treatment. DISCHARGE DIAGNOSES / PLAN:      Non-cardiogenic fluid overload/ESRD dependent on dialysis  Secondary to missing sessions of dialysis. Nephrology consulted and patient received dialysis on 2/11/2023 with continued significant uremic symptoms of nausea and vomiting. Dialysis per nephrology to be repeated x2. Patient states that her dialysis only is on Monday and Friday however dialysis unit confirms that patient is supposed to get dialysis on Monday, Wednesday and Friday. Patient is again counseled and reiterated that her dialysis is on Monday, Wednesday and Friday. Abdominal pain/constipation  Diarrhea resolved at this time likely reaction to shingles and booster for COVID-vaccine. Patient now with constipation. No BM over the last 72 hours. KUB with constipation and improved on bowel regimen with Colace and MiraLAX. Discharged home on Colace and MiraLAX.   Patient now tolerating regular diet. Hypoglycemia  No significant history of type 2 diabetes. Status post D50 x1 dose with improvement. No significant altered mentation or encephalopathy noted. Likely secondary to ESRD accompanied by significant nausea/vomiting for the last several days. Cont' on hypoglycemic protocol and monitor closely. Right Shoulder Tendinitis  Secondary tumor tubal vaccinations including COVID booster and shingles vaccine in the right shoulder. Patient continues to have significant pain and soreness throughout hospitalization. Discharged home on tramadol x5 days. History of atrial fibrillation  Controlled on Amiodarone and currently on Eliquis. Hypertension  Continue Cozaar, Norvasc, Coreg. Hx of DVT  On eliquis. Hypothyroidism   levothyroxine    PENDING TEST RESULTS:   At the time of discharge the following test results are still pending: None    FOLLOW UP APPOINTMENTS:    Hemodialysis on 2/15/2023      DIET: cardiac diet    ACTIVITY: activity as tolerated    DISCHARGE MEDICATIONS:     Medication List        START taking these medications      docusate 100 MG Caps  Commonly known as: COLACE, DULCOLAX  Take 100 mg by mouth 2 times daily     traMADol 50 MG tablet  Commonly known as: ULTRAM  Take 1 tablet by mouth every 8 hours as needed for Pain for up to 5 days.  Max Daily Amount: 150 mg            CONTINUE taking these medications      * albuterol (2.5 MG/3ML) 0.083% nebulizer solution  Commonly known as: PROVENTIL     * albuterol sulfate  (90 Base) MCG/ACT inhaler  Commonly known as: PROVENTIL;VENTOLIN;PROAIR     aluminum & magnesium hydroxide-simethicone 400-400-40 MG/5ML Susp  Commonly known as: MYLANTA     amiodarone 200 MG tablet  Commonly known as: CORDARONE     amLODIPine 10 MG tablet  Commonly known as: NORVASC     apixaban 2.5 MG Tabs tablet  Commonly known as: ELIQUIS     calcitRIOL 0.5 MCG capsule  Commonly known as: ROCALTROL     carvedilol 25 MG tablet  Commonly known as: COREG     cinacalcet 30 MG tablet  Commonly known as: SENSIPAR     Dexilant 60 MG Cpdr delayed release capsule  Generic drug: dexlansoprazole     dicyclomine 10 MG capsule  Commonly known as: BENTYL     doxercalciferol 4 MCG/2ML injection  Commonly known as: HECTOROL     EPINEPHrine 0.3 MG/0.3ML Soaj injection  Commonly known as: EPIPEN     epoetin amando [address]64 UNIT/ML injection  Commonly known as: EPOGEN;PROCRIT     ESTRACE VAGINAL 0.1 MG/GM vaginal cream  Generic drug: estradiol     famotidine 20 MG tablet  Commonly known as: PEPCID     fluticasone 50 MCG/ACT nasal spray  Commonly known as: FLONASE     Hyoscyamine Sulfate SL 0.125 MG Subl     levothyroxine 75 MCG tablet  Commonly known as: SYNTHROID     losartan 50 MG tablet  Commonly known as: COZAAR     meclizine 25 MG tablet  Commonly known as: ANTIVERT     montelukast 10 MG tablet  Commonly known as: SINGULAIR     ondansetron 4 MG disintegrating tablet  Commonly known as: ZOFRAN-ODT     polyethylene glycol 17 GM/SCOOP powder  Commonly known as: GLYCOLAX     predniSONE 5 MG tablet  Commonly known as: DELTASONE     sevelamer 800 MG tablet  Commonly known as: RENVELA     simvastatin 20 MG tablet  Commonly known as: ZOCOR     sucralfate 1 GM tablet  Commonly known as: CARAFATE     traZODone 50 MG tablet  Commonly known as: DESYREL     Trelegy Ellipta 100-62.5-25 MCG/ACT Aepb inhaler  Generic drug: fluticasone-umeclidin-vilant     valGANciclovir 450 MG tablet  Commonly known as: VALCYTE           * This list has 2 medication(s) that are the same as other medications prescribed for you. Read the directions carefully, and ask your doctor or other care provider to review them with you.                 STOP taking these medications      diclofenac sodium 1 % Gel  Commonly known as: VOLTAREN               Where to Get Your Medications        These medications were sent to [address] [place] [address], [address] [facility] [address]  [address]      Phone: [phone]   traMADol 50 MG tablet       Information about where to get these medications is not yet available    Ask your nurse or doctor about these medications  docusate 100 MG Caps           Recent Days:  Recent Labs     02/13/23  0336   WBC 5.1   HGB 11.5*   HCT 36.8        Recent Labs     02/13/23  0336   *   K 5.4   CL 96*   CO2 21   BUN 63*     No results for input(s): PH, PCO2, PO2, HCO3, FIO2 in the last 72 hours.       Recent Results (from the past 336 hour(s))   CBC with Auto Differential    Collection Time: 02/10/23 11:55 AM   Result Value Ref Range    WBC 7.9 4.6 - 13.2 K/uL    RBC 3.77 (L) 4.20 - 5.30 M/uL    Hemoglobin 11.0 (L) 12.0 - 16.0 g/dL    Hematocrit 34.9 (L) 35.0 - 45.0 %    MCV 92.6 78.0 - 100.0 FL    MCH 29.2 24.0 - 34.0 PG    MCHC 31.5 31.0 - 37.0 g/dL    RDW 17.5 (H) 11.6 - 14.5 %    Platelets 715 673 - 608 K/uL    MPV 8.7 (L) 9.2 - 11.8 FL    Nucleated RBCs 0.0 0.0  WBC    NRBC Absolute 0.00 0.00 - 0.01 K/uL    Neutrophils % 74 (H) 40 - 73 %    Lymphocytes % 19 (L) 21 - 52 %    Monocytes % 4 3 - 10 %    Eosinophils % 2 0 - 5 %    Basophils % 1 0 - 2 %    Immature Granulocytes 0 0 - 0.5 %    Neutrophils Absolute 5.8 1.8 - 8.0 K/UL    Lymphocytes Absolute 1.5 0.9 - 3.6 K/UL    Monocytes Absolute 0.3 0.05 - 1.2 K/UL    Eosinophils Absolute 0.2 0.0 - 0.4 K/UL    Basophils Absolute 0.1 0.0 - 0.1 K/UL    Granulocyte Absolute Count 0.0 0.00 - 0.04 K/UL    Differential Type AUTOMATED     Comprehensive Metabolic Panel    Collection Time: 02/10/23 11:55 AM   Result Value Ref Range    Sodium 135 (L) 136 - 145 mmol/L    Potassium 5.9 (H) 3.5 - 5.5 mmol/L    Chloride 106 100 - 111 mmol/L    CO2 16 (L) 21 - 32 mmol/L    Anion Gap 13 3.0 - 18.0 mmol/L    Glucose 78 74 - 99 mg/dL    BUN 95 (H) 7 - 18 mg/dL    Creatinine 12.60 (H) 0.60 - 1.30 mg/dL    Bun/Cre Ratio 8 (L) 12 - 20      ESTIMATED GLOMERULAR FILTRATION RATE 3 (L) >60 ml/min/1.73m2    Calcium 7.3 (L) 8.5 - 10.1 mg/dL    Total Bilirubin 0.6 0.2 - 1.0 mg/dL    AST 17 10 - 38 U/L    ALT 17 13 - 56 U/L    Alkaline Phosphatase 167 (H) 45 - 117 U/L    Total Protein 6.6 6.4 - 8.2 g/dL    Albumin 3.0 (L) 3.4 - 5.0 g/dL    Globulin 3.6 2.0 - 4.0 g/dL    Albumin/Globulin Ratio 0.8 0.8 - 1.7     Lipase    Collection Time: 02/10/23 11:55 AM   Result Value Ref Range    Lipase 157 73 - 393 U/L   Magnesium    Collection Time: 02/10/23 11:55 AM   Result Value Ref Range    Magnesium 2.4 1.6 - 2.6 mg/dL   Troponin, High Sensitivity    Collection Time: 02/10/23 11:55 AM   Result Value Ref Range    Troponin, High Sensitivity 23 0 - 54 ng/L   proBNP, N-TERMINAL    Collection Time: 02/10/23 11:55 AM   Result Value Ref Range    BNP 17,685 (H) 0 - 900 pg/mL   Hepatitis B Surface Antibody    Collection Time: 02/10/23 11:55 AM   Result Value Ref Range    Hepatitis B Surface Ag <3.10 mIU/mL    Hep B S Ab NONREACTIVE NONREACTIVE   Hepatitis B Surface Antigen    Collection Time: 02/10/23 11:55 AM   Result Value Ref Range    Hepatitis B Surface Ag <0.10  Negative   Index   EKG 12 Lead    Collection Time: 02/10/23  1:14 PM   Result Value Ref Range    Ventricular Rate 76 BPM    Atrial Rate 76 BPM    P-R Interval 200 ms    QRS Duration 94 ms    Q-T Interval 423 ms    QTc Calculation (Bazett) 476 ms    P Axis 44 degrees    R Axis 57 degrees    T Axis 53 degrees    Diagnosis       Sinus rhythm  WNL    Confirmed by PABLO Mcdaniel ([id number]) on 2/10/2023 1:14:06 PM     POCT Glucose    Collection Time: 02/10/23  4:58 PM   Result Value Ref Range    POC Glucose 62 (L) 70 - 110 mg/dL    Performed by: Belen Beltran    CBC    Collection Time: 02/11/23  5:25 AM   Result Value Ref Range    WBC 7.3 4.6 - 13.2 K/uL    RBC 3.95 (L) 4.20 - 5.30 M/uL    Hemoglobin 12.0 12.0 - 16.0 g/dL    Hematocrit 36.8 35.0 - 45.0 %    MCV 93.2 78.0 - 100.0 FL    MCH 30.4 24.0 - 34.0 PG    MCHC 32.6 31.0 - 37.0 g/dL    RDW 17.5 (H) 11.6 - 14.5 %    Platelets 644 085 - 192 K/uL    MPV 8.9 (L) 9.2 - 11.8 FL    Nucleated RBCs 0.0 0.0  WBC    nRBC 0.00 0.00 - 0.01 K/uL   Comprehensive Metabolic Panel    Collection Time: 02/11/23  5:25 AM   Result Value Ref Range    Sodium 137 136 - 145 mmol/L    Potassium 4.3 3.5 - 5.5 mmol/L    Chloride 99 (L) 100 - 111 mmol/L    CO2 22 21 - 32 mmol/L    Anion Gap 16 3.0 - 18.0 mmol/L    Glucose 67 (L) 74 - 99 mg/dL    BUN 36 (H) 7 - 18 mg/dL    Creatinine 6.81 (H) 0.60 - 1.30 mg/dL    Bun/Cre Ratio 5 (L) 12 - 20      Est, Glom Filt Rate 6 (L) >60 ml/min/1.73m2    Calcium 8.5 8.5 - 10.1 mg/dL    Total Bilirubin 0.9 0.2 - 1.0 mg/dL    AST 30 10 - 38 U/L    ALT 28 13 - 56 U/L    Alk Phosphatase 225 (H) 45 - 117 U/L    Total Protein 6.9 6.4 - 8.2 g/dL    Albumin 3.1 (L) 3.4 - 5.0 g/dL    Globulin 3.8 2.0 - 4.0 g/dL    Albumin/Globulin Ratio 0.8 0.8 - 1.7     Basic Metabolic Panel    Collection Time: 02/13/23  3:36 AM   Result Value Ref Range    Sodium 132 (L) 136 - 145 mmol/L    Potassium 5.4 3.5 - 5.5 mmol/L    Chloride 96 (L) 100 - 111 mmol/L    CO2 21 21 - 32 mmol/L    Anion Gap 15 3.0 - 18.0 mmol/L    Glucose 35 (LL) 74 - 99 mg/dL    BUN 63 (H) 7 - 18 mg/dL    Creatinine 10.10 (H) 0.60 - 1.30 mg/dL    Bun/Cre Ratio 6 (L) 12 - 20      Est, Glom Filt Rate 4 (L) >60 ml/min/1.73m2    Calcium 7.9 (L) 8.5 - 10.1 mg/dL   CBC with Auto Differential    Collection Time: 02/13/23  3:36 AM   Result Value Ref Range    WBC 5.1 4.6 - 13.2 K/uL    RBC 3.87 (L) 4.20 - 5.30 M/uL    Hemoglobin 11.5 (L) 12.0 - 16.0 g/dL    Hematocrit 36.8 35.0 - 45.0 %    MCV 95.1 78.0 - 100.0 FL    MCH 29.7 24.0 - 34.0 PG    MCHC 31.3 31.0 - 37.0 g/dL    RDW 17.2 (H) 11.6 - 14.5 %    Platelets 637 579 - 813 K/uL    MPV 9.4 9.2 - 11.8 FL    Nucleated RBCs 0.0 0.0  WBC    nRBC 0.00 0.00 - 0.01 K/uL    Seg Neutrophils 62 40 - 73 %    Lymphocytes 31 21 - 52 %    Monocytes 3 3 - 10 %    Eosinophils % 3 0 - 5 % Basophils 1 0 - 2 %    Immature Granulocytes 0 0 - 0.5 %    Segs Absolute 3.1 1.8 - 8.0 K/UL    Absolute Lymph # 1.6 0.9 - 3.6 K/UL    Absolute Mono # 0.2 0.05 - 1.2 K/UL    Absolute Eos # 0.2 0.0 - 0.4 K/UL    Basophils Absolute 0.1 0.0 - 0.1 K/UL    Absolute Immature Granulocyte 0.0 0.00 - 0.04 K/UL    Differential Type AUTOMATED     Hemoglobin A1c with eAG    Collection Time: 02/13/23  3:36 AM   Result Value Ref Range    Hemoglobin A1C 4.7 4.2 - 5.6 %    eAG 88 mg/dL   POCT Glucose    Collection Time: 02/13/23  6:03 AM   Result Value Ref Range    POC Glucose 39 (LL) 70 - 110 mg/dL    Performed by: Aldo Aburto    POCT Glucose    Collection Time: 02/13/23  6:28 AM   Result Value Ref Range    POC Glucose 73 70 - 110 mg/dL    Performed by: Aldo Aburto    POCT Glucose    Collection Time: 02/13/23  7:31 AM   Result Value Ref Range    POC Glucose 194 (H) 70 - 110 mg/dL    Performed by: Mary Alarcon    POCT Glucose    Collection Time: 02/13/23 12:32 PM   Result Value Ref Range    POC Glucose 52 (LL) 70 - 110 mg/dL    Performed by: Mary Allis    POCT Glucose    Collection Time: 02/13/23 12:37 PM   Result Value Ref Range    POC Glucose 52 (LL) 70 - 110 mg/dL    Performed by: Mary Allis    POCT Glucose    Collection Time: 02/13/23 12:55 PM   Result Value Ref Range    POC Glucose 77 70 - 110 mg/dL    Performed by: Mary Allis    POCT Glucose    Collection Time: 02/13/23  6:17 PM   Result Value Ref Range    POC Glucose 58 (L) 70 - 110 mg/dL    Performed by: Mary Alarcon    POCT Glucose    Collection Time: 02/13/23  6:39 PM   Result Value Ref Range    POC Glucose 67 (L) 70 - 110 mg/dL    Performed by: Keeley Vela    POCT Glucose    Collection Time: 02/13/23  8:18 PM   Result Value Ref Range    POC Glucose 118 (H) 70 - 110 mg/dL    Performed by:  Santos FERNÁNDEZ    POCT Glucose    Collection Time: 02/14/23  7:32 AM   Result Value Ref Range    POC Glucose 91 70 - 110 mg/dL    Performed by: Joselin Woodruff Rosaura    POCT Glucose    Collection Time: 02/14/23 11:29 AM   Result Value Ref Range    POC Glucose 122 (H) 70 - 110 mg/dL    Performed by: Yves Escobedo         NOTIFY YOUR PHYSICIAN FOR ANY OF THE FOLLOWING:   Fever over 101 degrees for 24 hours. Chest pain, shortness of breath, fever, chills, nausea, vomiting, diarrhea, change in mentation, falling, weakness, bleeding. Severe pain or pain not relieved by medications. Or, any other signs or symptoms that you may have questions about. DISPOSITION:  x  Home With:   OT  PT  HH  RN       Long term SNF/Inpatient Rehab    Independent/assisted living    Hospice    Other:       PATIENT CONDITION AT DISCHARGE:     Functional status    Poor     Deconditioned    x Independent      Cognition   x  Lucid     Forgetful     Dementia      Catheters/lines (plus indication)    [place]     PICC     PEG    x None      Code status    x Full code     DNR      PHYSICAL EXAMINATION AT DISCHARGE:  General:          Alert, cooperative, no distress, appears stated age. HEENT:           Atraumatic, anicteric sclerae, pink conjunctivae                          No oral ulcers, mucosa moist, throat clear, dentition fair  Neck:               Supple, symmetrical  Lungs:             Clear to auscultation bilaterally. No Wheezing or Rhonchi. No rales. Chest wall:      No tenderness  No Accessory muscle use. Heart:              Regular  rhythm,  No  murmur   No edema  Abdomen:        Soft, non-tender. Not distended. Bowel sounds normal  Extremities:     No cyanosis. No clubbing,                            Skin turgor normal, Capillary refill normal  Skin:                Not pale. Not Jaundiced  No rashes   Psych:             Not anxious or agitated.   Neurologic:      Alert, moves all extremities, answers questions appropriately and responds to commands       CHRONIC MEDICAL DIAGNOSES:      Greater than 35 minutes were spent with the patient on counseling and coordination of care    Signed:   Ernestina Liang MD  2/14/2023  1:01 PM

## 2023-02-14 NOTE — PLAN OF CARE
Comprehensive Nutrition Assessment    Type and Reason for Visit:  Initial, Consult    Nutrition Recommendations/Plan:   Consider liberalizing diet to 3 gm Na   Add Nepro Daily (may need to add 2-3 oz strawberry ice cream for improved intake)   Implement food preferences for improved glucose  Offer smaller snacks every 2-3 hours   Check phosphorus     Malnutrition Assessment:  Malnutrition Status:  Severe malnutrition (02/13/23 1850)    Context:  Chronic Illness     Findings of the 6 clinical characteristics of malnutrition:  Energy Intake:  75% or less estimated energy requirements for 1 month or longer  Weight Loss:  No significant weight loss     Body Fat Loss:  Mild body fat loss Fat Overlying Ribs, Triceps   Muscle Mass Loss:  Severe muscle mass loss Temples (temporalis), Clavicles (pectoralis & deltoids), Hand (interosseous)  Fluid Accumulation:  Mild (just completed dialysis) Generalized    Nutrition Assessment:    Pt admitted for ESRD/Fluid overload due to missed dialysis sessions. Hypoglycemia in am due to poor appetite, n/v. PMHx HTN, atrial fibrillation, hypothyroidism, DVT. Pt ate 2 bites of cake for supper tonight after dialysis. RD prepared milkshake with supplement and strawberry ice cream and pt was able to drink 51-75% of milkshake. She refused all other menu options. Pt states she likes eggs, pancakes,chicken salad; preferences will be given tomorrow. Labs - multiple episodes of hypoglycemia due to poor intake. Nutrition Related Findings:    Denies constipation or diarrhea. Nausea and Vomiting resolved after dialysis completed. Glucose 67 after dialysis, milkshake given and consumed. Nursing to re-check gluocse Wound Type: None       Current Nutrition Intake & Therapies:    Average Meal Intake: 1-25%  Average Supplements Intake: None Ordered  ADULT DIET; Regular; Low Sodium (2 gm); Low Potassium (Less than 3000 mg/day);  Low Phosphorus (Less than 1000 mg); 60 to 80 gm; 1500 ml    Anthropometric Measures:  Height: 5' 1\" (154.9 cm)  Ideal Body Weight (IBW): 105 lbs (48 kg)       Current Body Weight:  ,   IBW. Current BMI (kg/m2):        Estimated Daily Nutrient Needs:  Energy Requirements Based On: Kcal/kg     Energy (kcal/day): 1630-1960Kcal/d (25-30 Kcal/kg)     Protein (g/day): 65-78 gm/d (1-1.2 gm/kg)  Method Used for Fluid Requirements: Standard Renal  Fluid (ml/day): 1500 ml/d    Nutrition Diagnosis:   Severe malnutrition, In context of chronic illness related to cardiac dysfunction, renal dysfunction, inadequate protein-energy intake, early satiety as evidenced by Criteria as identified in malnutrition assessment, lab values, nausea, vomiting, poor dentition    Nutrition Interventions:   Food and/or Nutrient Delivery: Continue Current Diet, Start Oral Nutrition Supplement  Nutrition Education/Counseling: Counseling not indicated  Coordination of Nutrition Care: Continue to monitor while inpatient     Goals:     Goals: PO intake 50% or greater, by next RD assessment     Nutrition Monitoring and Evaluation:      Food/Nutrient Intake Outcomes: Food and Nutrient Intake, Supplement Intake  Physical Signs/Symptoms Outcomes: Biochemical Data, Nausea or Vomiting, Fluid Status or Edema    Discharge Planning:     Too soon to determine     [address] [place] [address] 22: [phone] or [facility]AM [facility]

## 2023-02-14 NOTE — PROGRESS NOTES
NO EVENTS DURING SHIFT   PATIENT REMAINS A/O X 4  CONSUMED AM MEDS  CONSUMED BREAKFAST  LUNGS SOUNDS CLEAR ON RA      MD ROUNDS  MEDICATED X 1 FOR PAIN  MEDICATED X 1 FOR NAUSEA  IV REMOVED  DC INSTRUCTIONS REVIEWED WITH PATIENT; PATIENT HAD NO FURTHER QUESTIONS    PATIENT INSTRUCTED ON DIALYSIS DAYS- PATIENT CONTINUES TO STATES SHE ONLY GOES MWF    1314-PATIENT WHEEDLED DOWN TO TRANSPORTING VEHICLE W/O DISTRESS

## 2023-02-16 ENCOUNTER — CARE COORDINATION (OUTPATIENT)
Facility: CLINIC | Age: 68
End: 2023-02-16

## 2023-02-16 ASSESSMENT — PATIENT HEALTH QUESTIONNAIRE - PHQ9
SUM OF ALL RESPONSES TO PHQ QUESTIONS 1-9: 1

## 2023-02-16 NOTE — CARE COORDINATION
Ambulatory Care Coordination Note  2023    Patient Current Location:  [place]     ACM contacted the patient by telephone. Verified name and  with patient as identifiers. Provided introduction to self, and explanation of the ACM role. Challenges to be reviewed by the provider   Additional needs identified to be addressed with provider: No  none               Method of communication with provider: phone. ACM: Von Sheppard RN    Encounter Note:    Spoke with patient - Patient states doing well at present   Further questions answered as needed and patient has ACM contact information   Patient recently d/c from hospital. May need CTN asistance. Depression screen done - PHQ2 score = 0       Offered patient enrollment in the Remote Patient Monitoring (RPM) program for in-home monitoring: NA.     Lab Results       None            Care Coordination Interventions    Referral from Primary Care Provider: No  Suggested Interventions and Community Resources          Goals Addressed                   This Visit's Progress     Attend follow up appointments on schedule   On track     Prepare patients and caregivers for end of life decisions (ie. need for hospice, pain management, symptom relief, advance directives etc.)   On track     Take all medications as ordered   On track            Future Appointments   Date Time Provider Candace Velez   2023  3:15 PM Lila Levy MD [facility] BS AMB

## 2023-02-24 ENCOUNTER — CARE COORDINATION (OUTPATIENT)
Facility: CLINIC | Age: 68
End: 2023-02-24

## 2023-02-24 RX ORDER — FLUTICASONE FUROATE, UMECLIDINIUM BROMIDE AND VILANTEROL TRIFENATATE 100; 62.5; 25 UG/1; UG/1; UG/1
1 POWDER RESPIRATORY (INHALATION) DAILY
Qty: 60 EACH | Refills: 0 | Status: SHIPPED | OUTPATIENT
Start: 2023-02-24 | End: 2023-04-17

## 2023-02-24 NOTE — CARE COORDINATION
Patient in dialysis at time of call - Spoke with daughter. Stated Patient doing well - will contact at a later date.

## 2023-02-26 ENCOUNTER — APPOINTMENT (OUTPATIENT)
Age: 68
End: 2023-02-26
Payer: MEDICARE

## 2023-02-26 ENCOUNTER — HOSPITAL ENCOUNTER (EMERGENCY)
Age: 68
Discharge: HOME | End: 2023-02-26
Attending: EMERGENCY MEDICINE
Payer: MEDICARE

## 2023-02-26 VITALS
HEIGHT: 61 IN | HEART RATE: 93 BPM | TEMPERATURE: 97.6 F | WEIGHT: 140 LBS | OXYGEN SATURATION: 98 % | RESPIRATION RATE: 19 BRPM | BODY MASS INDEX: 26.43 KG/M2 | DIASTOLIC BLOOD PRESSURE: 75 MMHG | SYSTOLIC BLOOD PRESSURE: 178 MMHG

## 2023-02-26 DIAGNOSIS — R53.81 CHRONIC FATIGUE AND MALAISE: ICD-10-CM

## 2023-02-26 DIAGNOSIS — R53.82 CHRONIC FATIGUE AND MALAISE: ICD-10-CM

## 2023-02-26 DIAGNOSIS — N18.6 STAGE 5 CHRONIC KIDNEY DISEASE ON CHRONIC DIALYSIS (HCC): ICD-10-CM

## 2023-02-26 DIAGNOSIS — R11.0 NAUSEA: Primary | ICD-10-CM

## 2023-02-26 DIAGNOSIS — N18.6 END STAGE RENAL DISEASE (HCC): ICD-10-CM

## 2023-02-26 DIAGNOSIS — Z99.2 STAGE 5 CHRONIC KIDNEY DISEASE ON CHRONIC DIALYSIS (HCC): ICD-10-CM

## 2023-02-26 LAB
ALBUMIN SERPL-MCNC: 2.9 G/DL (ref 3.4–5)
ALBUMIN/GLOB SERPL: 0.8 (ref 0.8–1.7)
ALP SERPL-CCNC: 172 U/L (ref 45–117)
ALT SERPL-CCNC: 17 U/L (ref 13–56)
ANION GAP SERPL CALC-SCNC: 16 MMOL/L (ref 3–18)
ARTERIAL PATENCY WRIST A: YES
AST SERPL W P-5'-P-CCNC: 18 U/L (ref 10–38)
BASE DEFICIT BLDA-SCNC: 1.8 MMOL/L
BASOPHILS # BLD: 0.1 K/UL (ref 0–0.1)
BASOPHILS NFR BLD: 1 % (ref 0–2)
BDY SITE: ABNORMAL
BILIRUB SERPL-MCNC: 0.5 MG/DL (ref 0.2–1)
BUN SERPL-MCNC: 39 MG/DL (ref 7–18)
BUN/CREAT SERPL: 5 (ref 12–20)
CA-I BLD-MCNC: 8.4 MG/DL (ref 8.5–10.1)
CHLORIDE SERPL-SCNC: 96 MMOL/L (ref 100–111)
CO2 SERPL-SCNC: 23 MMOL/L (ref 21–32)
COHGB MFR BLD: 0.7 % (ref 1–2)
CREAT SERPL-MCNC: 8.19 MG/DL (ref 0.6–1.3)
DIFFERENTIAL METHOD BLD: ABNORMAL
EOSINOPHIL # BLD: 0 K/UL (ref 0–0.4)
EOSINOPHIL NFR BLD: 0 % (ref 0–5)
ERYTHROCYTE [DISTWIDTH] IN BLOOD BY AUTOMATED COUNT: 17.6 % (ref 11.6–14.5)
FIO2 ON VENT: 21 %
FLUAV AG NPH QL IA: NEGATIVE
FLUBV AG NOSE QL IA: NEGATIVE
GLOBULIN SER CALC-MCNC: 3.5 G/DL (ref 2–4)
GLUCOSE SERPL-MCNC: 78 MG/DL (ref 74–99)
HCO3 BLDA-SCNC: 22 MMOL/L (ref 22–26)
HCT VFR BLD AUTO: 32.8 % (ref 35–45)
HGB BLD-MCNC: 10.4 G/DL (ref 12–16)
IMM GRANULOCYTES # BLD AUTO: 0 K/UL (ref 0–0.04)
IMM GRANULOCYTES NFR BLD AUTO: 1 % (ref 0–0.5)
LACTATE SERPL-SCNC: 0.8 MMOL/L (ref 0.4–2)
LIPASE SERPL-CCNC: 88 U/L (ref 73–393)
LYMPHOCYTES # BLD: 0.9 K/UL (ref 0.9–3.6)
LYMPHOCYTES NFR BLD: 16 % (ref 21–52)
MCH RBC QN AUTO: 29.8 PG (ref 24–34)
MCHC RBC AUTO-ENTMCNC: 31.7 G/DL (ref 31–37)
MCV RBC AUTO: 94 FL (ref 78–100)
METHGB MFR BLD: 0 % (ref 0–1.4)
MONOCYTES # BLD: 0.2 K/UL (ref 0.05–1.2)
MONOCYTES NFR BLD: 4 % (ref 3–10)
NEUTS SEG # BLD: 4.4 K/UL (ref 1.8–8)
NEUTS SEG NFR BLD: 78 % (ref 40–73)
NRBC # BLD: 0 K/UL (ref 0–0.01)
NRBC BLD-RTO: 0 PER 100 WBC
OXYHGB MFR BLD: 94.3 % (ref 95–99)
PCO2 BLDA: 35 MMHG (ref 35–45)
PERFORMED BY:: ABNORMAL
PH BLDA: 7.42 (ref 7.35–7.45)
PLATELET # BLD AUTO: 234 K/UL (ref 135–420)
PMV BLD AUTO: 9.1 FL (ref 9.2–11.8)
PO2 BLDA: 81 MMHG (ref 80–100)
POTASSIUM SERPL-SCNC: 4.4 MMOL/L (ref 3.5–5.5)
PROT SERPL-MCNC: 6.4 G/DL (ref 6.4–8.2)
RBC # BLD AUTO: 3.49 M/UL (ref 4.2–5.3)
SAO2 % BLD: 95 % (ref 95–99)
SAO2% DEVICE SAO2% SENSOR NAME: ABNORMAL
SODIUM SERPL-SCNC: 135 MMOL/L (ref 136–145)
SPECIMEN SITE: ABNORMAL
WBC # BLD AUTO: 5.6 K/UL (ref 4.6–13.2)

## 2023-02-26 PROCEDURE — 2580000003 HC RX 258: Performed by: EMERGENCY MEDICINE

## 2023-02-26 PROCEDURE — 96361 HYDRATE IV INFUSION ADD-ON: CPT | Performed by: EMERGENCY MEDICINE

## 2023-02-26 PROCEDURE — 83605 ASSAY OF LACTIC ACID: CPT

## 2023-02-26 PROCEDURE — 36600 WITHDRAWAL OF ARTERIAL BLOOD: CPT

## 2023-02-26 PROCEDURE — 99284 EMERGENCY DEPT VISIT MOD MDM: CPT | Performed by: EMERGENCY MEDICINE

## 2023-02-26 PROCEDURE — 80053 COMPREHEN METABOLIC PANEL: CPT

## 2023-02-26 PROCEDURE — 96374 THER/PROPH/DIAG INJ IV PUSH: CPT | Performed by: EMERGENCY MEDICINE

## 2023-02-26 PROCEDURE — 74176 CT ABD & PELVIS W/O CONTRAST: CPT

## 2023-02-26 PROCEDURE — 87804 INFLUENZA ASSAY W/OPTIC: CPT

## 2023-02-26 PROCEDURE — 6360000002 HC RX W HCPCS: Performed by: EMERGENCY MEDICINE

## 2023-02-26 PROCEDURE — 71045 X-RAY EXAM CHEST 1 VIEW: CPT

## 2023-02-26 PROCEDURE — 83690 ASSAY OF LIPASE: CPT

## 2023-02-26 PROCEDURE — 82803 BLOOD GASES ANY COMBINATION: CPT

## 2023-02-26 PROCEDURE — 85025 COMPLETE CBC W/AUTO DIFF WBC: CPT

## 2023-02-26 RX ORDER — 0.9 % SODIUM CHLORIDE 0.9 %
250 INTRAVENOUS SOLUTION INTRAVENOUS ONCE
Status: COMPLETED | OUTPATIENT
Start: 2023-02-26 | End: 2023-02-26

## 2023-02-26 RX ORDER — ONDANSETRON 2 MG/ML
4 INJECTION INTRAMUSCULAR; INTRAVENOUS
Status: COMPLETED | OUTPATIENT
Start: 2023-02-26 | End: 2023-02-26

## 2023-02-26 RX ORDER — ONDANSETRON 4 MG/1
4 TABLET, FILM COATED ORAL 3 TIMES DAILY PRN
Qty: 15 TABLET | Refills: 0 | Status: ON HOLD | OUTPATIENT
Start: 2023-02-26

## 2023-02-26 RX ADMIN — SODIUM CHLORIDE 250 ML: 9 INJECTION, SOLUTION INTRAVENOUS at 19:01

## 2023-02-26 RX ADMIN — ONDANSETRON 4 MG: 2 INJECTION INTRAMUSCULAR; INTRAVENOUS at 19:01

## 2023-02-26 ASSESSMENT — ENCOUNTER SYMPTOMS
CONSTIPATION: 0
NAUSEA: 1
VOMITING: 1
ABDOMINAL PAIN: 1
RESPIRATORY NEGATIVE: 1
DIARRHEA: 0

## 2023-02-26 ASSESSMENT — LIFESTYLE VARIABLES: HOW MANY STANDARD DRINKS CONTAINING ALCOHOL DO YOU HAVE ON A TYPICAL DAY: PATIENT DOES NOT DRINK

## 2023-02-26 NOTE — ED PROVIDER NOTES
[facility] EMERGENCY DEPT  EMERGENCY DEPARTMENT HISTORY AND PHYSICAL EXAM        Date: 2/26/2023  Patient Name: Hawa Johnson  MRN: [id number]  [place] 1955  Date of evaluation: 2/26/2023  Provider: Baljit Burgess MD   Note Started: 7:19 PM 2/26/23    HISTORY OF PRESENT ILLNESS     Chief Complaint   Patient presents with    Fever    Emesis       History Provided By: Patient and Patient's Brother    HPI: Hawa Johnson, 74 y.o. female   Patient with PMHx ESRF on dialysis MF presents with fever, chills, weakness, NV which started after dialysis 2 days ago. According to brother, she has been unable to keep any food or drinks down. Has been getting progressively weaker. No hematemesis, constipation. She has been having vague diffuse abdominal pain or cramps. No diarrhea. The history is provided by the patient and a relative.        PAST MEDICAL HISTORY   Past Medical History:  Past Medical History:   Diagnosis Date    JULIET (acute kidney injury) ([facility] [address] 05/09/2019    Anxiety     Arrhythmia     Arthritis     Asthma     Asthma     Burning with urination     frequent uti    Calculus of gallbladder with acute cholecystitis without obstruction 10/09/2020    Cholecystitis 01/12/2019    Chronic kidney disease     dialysis    CMV (cytomegalovirus) antibody positive     Colitis 09/10/2022    COPD (chronic obstructive pulmonary disease) ([facility]     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    Dialysis patient [facility])     M/W/F    Diverticular disease of colon 08/21/2013    Dyspepsia and other specified disorders of function of stomach     stomach ulcer    Elevated troponin 10/21/2019    Encounter for cholecystectomy 05/06/2021 7/2020    Essential hypertension     GERD (gastroesophageal reflux disease)     History of chemotherapy     History of renal transplant 08/21/2013    (LD 2/12/2002)    HLD (hyperlipidemia)     Hypercholesteremia     Hypertension     Hypothyroidism 03/23/2022    Kidney transplant rejection     Menopause     Migraine Obesity     Osteoporosis     Pancreatitis 08/28/2022    Pyelonephritis 07/13/2020    Recurrent urinary tract infection 09/27/2016    Renal cyst 2002    kidney transplant     Spontaneous bacterial peritonitis ([facility] [address] 01/16/2019    Ulcer        Past Surgical History:  Past Surgical History:   Procedure Laterality Date    CHOLECYSTECTOMY  02/2021    COLONOSCOPY  05/13/2022    COLONOSCOPY      Dr Geni Bearden  5yrs ago    COLONOSCOPY      with Dr. Grant Bearden  5 yrs ago    GI      ulcer    HEENT      sinus surg    KIDNEY TRANSPLANT      OTHER SURGICAL HISTORY  02/21/2022    SIGMOIDOSCOPY, FLEXIBLE;  Surgeon: Anh Mcdaniels MD    TRANSPLANT      kidney transplant 2002    VASCULAR SURGERY      shunt in prep for dialysis       Family History:  Family History   Problem Relation Age of Onset    Diabetes Mother     Rheum Arthritis Mother     Hypertension Father     Diabetes Father     Thyroid Disease Sister     Colon Polyps Brother        Social History:  Social History     Tobacco Use    Smoking status: Never    Smokeless tobacco: Never   Substance Use Topics    Alcohol use: No    Drug use: No       Allergies: Allergies   Allergen Reactions    Aspirin Rash     Other reaction(s): Unknown (comments)    Bromfenac Rash     Other reaction(s): Unknown (comments)    Cefepime Other (See Comments)     Neurological reaction    Ceftriaxone Rash    Copper Rash    Ibuprofen Rash     Other reaction(s): Unknown (comments)    Ketorolac Tromethamine Rash     Other reaction(s): Unknown (comments)    Nabumetone Rash     Other reaction(s): Unknown (comments)    Rifampin Rash     Other reaction(s): Unknown (comments)    Sulfamethoxazole-Trimethoprim Rash     Other reaction(s): Unknown (comments)    Vancomycin Rash     Other reaction(s): Unknown (comments)  Pt reports causes itching, takes benadryl prior to use. Pt denies anaphylaxis.     Requires benadryl with each vancomycin dose       PCP: Rashid RAMIREZ Jerson Cruz MD    Current Meds:   Previous Medications    ALBUTEROL (PROVENTIL) (2.5 MG/3ML) 0.083% NEBULIZER SOLUTION    USE 1 VIAL VIA NEBULIZER THREE TIMES DAILY    ALBUTEROL SULFATE HFA (PROVENTIL;VENTOLIN;PROAIR) 108 (90 BASE) MCG/ACT INHALER    Inhale 1 puff into the lungs every 4 hours as needed    ALUMINUM & MAGNESIUM HYDROXIDE-SIMETHICONE (MYLANTA) 400-400-40 MG/5ML SUSP    Take 10 mLs by mouth every 6 hours as needed    AMIODARONE (CORDARONE) 200 MG TABLET    TAKE 1 TABLET BY MOUTH EVERY DAY    AMLODIPINE (NORVASC) 10 MG TABLET    Take 1 tablet by mouth once daily    APIXABAN (ELIQUIS) 2.5 MG TABS TABLET    Take 2.5 mg by mouth 2 times daily    CALCITRIOL (ROCALTROL) 0.5 MCG CAPSULE    Take 0.5 mcg by mouth See Admin Instructions    CARVEDILOL (COREG) 25 MG TABLET    Take 25 mg by mouth 2 times daily (with meals)    CINACALCET (SENSIPAR) 30 MG TABLET    Take 30 mg by mouth daily    DEXLANSOPRAZOLE (DEXILANT) 60 MG CPDR DELAYED RELEASE CAPSULE    TAKE 1 CAPSULE BY MOUTH IN THE MORNING.     DICYCLOMINE (BENTYL) 10 MG CAPSULE    Take 10 mg by mouth daily    DOCUSATE SODIUM (COLACE, DULCOLAX) 100 MG CAPS    Take 100 mg by mouth 2 times daily    DOXERCALCIFEROL (HECTOROL) 4 MCG/2ML INJECTION    Infuse 6 mcg intravenously    EPINEPHRINE (EPIPEN) 0.3 MG/0.3ML SOAJ INJECTION    INJECT 0.3 ML INTRAMUSCULARLY ONCE AS NEEDED FOR ALLERGIC RESPONSE    EPOETIN PINO (EPOGEN;PROCRIT) 62423 UNIT/ML INJECTION    Inject 10,000 Units into the skin    ESTRADIOL (ESTRACE VAGINAL) 0.1 MG/GM VAGINAL CREAM    INSERT 2 GRAMS INTO VAGINA EVERY MONDAY AND THURSDAY    FAMOTIDINE (PEPCID) 20 MG TABLET    Take 20 mg by mouth daily    FLUTICASONE (FLONASE) 50 MCG/ACT NASAL SPRAY    USE 1 SPRAY IN EACH NOSTRIL EVERY MORNING    FLUTICASONE-UMECLIDIN-VILANT (TRELEGY ELLIPTA) 100-62.5-25 MCG/ACT AEPB INHALER    Inhale 1 puff into the lungs daily    HYOSCYAMINE SULFATE SL 0.125 MG SUBL    Place 0.125 mg under the tongue every 4 hours as needed LEVOTHYROXINE (SYNTHROID) 75 MCG TABLET    Take 75 mcg by mouth every morning (before breakfast)    LOSARTAN (COZAAR) 50 MG TABLET    Take 50 mg by mouth daily    MECLIZINE (ANTIVERT) 25 MG TABLET    TAKE 1 TABLET THREE TIMES DAILY AS NEEDED FOR DIZZINESS    MONTELUKAST (SINGULAIR) 10 MG TABLET    Take 10 mg by mouth daily    ONDANSETRON (ZOFRAN-ODT) 4 MG DISINTEGRATING TABLET    Take 8 mg by mouth every 8 hours as needed    POLYETHYLENE GLYCOL (GLYCOLAX) 17 GM/SCOOP POWDER    Take 17 g by mouth 2 times daily    PREDNISONE (DELTASONE) 5 MG TABLET    Take 1 tablet by mouth daily    SEVELAMER (RENVELA) 800 MG TABLET    Take 800 mg by mouth 3 times daily    SIMVASTATIN (ZOCOR) 20 MG TABLET    TAKE 1 TABLET BY MOUTH DAILY AS DIRECTED. SUCRALFATE (CARAFATE) 1 GM TABLET    TAKE 1 TABLET BY MOUTH FOUR TIMES DAILY    TRAZODONE (DESYREL) 50 MG TABLET    Take 50 mg by mouth    VALGANCICLOVIR (VALCYTE) 450 MG TABLET    Take 900 mg by mouth daily       REVIEW OF SYSTEMS   Review of Systems   Constitutional:  Positive for appetite change, chills and fever. HENT: Negative. Respiratory: Negative. Cardiovascular: Negative. Gastrointestinal:  Positive for abdominal pain, nausea and vomiting. Negative for constipation and diarrhea. Genitourinary: Negative. Musculoskeletal: Negative. Neurological: Negative. All other systems reviewed and are negative. Positives and Pertinent negatives as per HPI. PHYSICAL EXAM     Vitals:    02/26/23 1528   BP: (!) 178/77   Pulse: 77   Resp: 16   Temp: 97.6 °F (36.4 °C)   TempSrc: Oral   SpO2: 99%   Weight: 140 lb (63.5 kg)   Height: 5' 1\" (1.549 m)     Physical Exam  Vitals and nursing note reviewed. Constitutional:       Appearance: She is not ill-appearing. HENT:      Head: Normocephalic. Cardiovascular:      Rate and Rhythm: Normal rate and regular rhythm. Pulses: Normal pulses. Heart sounds: Normal heart sounds.    Pulmonary:      Effort: Pulmonary effort is normal.      Breath sounds: Normal breath sounds. Abdominal:      Palpations: Abdomen is soft. Tenderness: There is no abdominal tenderness. Musculoskeletal:      Cervical back: Normal range of motion and neck supple. Comments: LUE AV fistula   Neurological:      General: No focal deficit present. Mental Status: She is alert and oriented to person, place, and time.        SCREENINGS                No data recorded     LAB, EKG AND DIAGNOSTIC RESULTS   Labs:  Recent Results (from the past 12 hour(s))   Rapid influenza A/B antigens    Collection Time: 02/26/23  3:28 PM    Specimen: Nasal Washing   Result Value Ref Range    Influenza A Ag Negative Negative      Influenza B Ag Negative Negative     CBC with Auto Differential    Collection Time: 02/26/23  5:55 PM   Result Value Ref Range    WBC 5.6 4.6 - 13.2 K/uL    RBC 3.49 (L) 4.20 - 5.30 M/uL    Hemoglobin 10.4 (L) 12.0 - 16.0 g/dL    Hematocrit 32.8 (L) 35.0 - 45.0 %    MCV 94.0 78.0 - 100.0 FL    MCH 29.8 24.0 - 34.0 PG    MCHC 31.7 31.0 - 37.0 g/dL    RDW 17.6 (H) 11.6 - 14.5 %    Platelets 643 758 - 037 K/uL    MPV 9.1 (L) 9.2 - 11.8 FL    Nucleated RBCs 0.0 0.0  WBC    nRBC 0.00 0.00 - 0.01 K/uL    Seg Neutrophils 78 (H) 40 - 73 %    Lymphocytes 16 (L) 21 - 52 %    Monocytes 4 3 - 10 %    Eosinophils % 0 0 - 5 %    Basophils 1 0 - 2 %    Immature Granulocytes 1 (H) 0 - 0.5 %    Segs Absolute 4.4 1.8 - 8.0 K/UL    Absolute Lymph # 0.9 0.9 - 3.6 K/UL    Absolute Mono # 0.2 0.05 - 1.2 K/UL    Absolute Eos # 0.0 0.0 - 0.4 K/UL    Basophils Absolute 0.1 0.0 - 0.1 K/UL    Absolute Immature Granulocyte 0.0 0.00 - 0.04 K/UL    Differential Type AUTOMATED     CMP    Collection Time: 02/26/23  5:55 PM   Result Value Ref Range    Sodium 135 (L) 136 - 145 mmol/L    Potassium 4.4 3.5 - 5.5 mmol/L    Chloride 96 (L) 100 - 111 mmol/L    CO2 23 21 - 32 mmol/L    Anion Gap 16 3.0 - 18.0 mmol/L    Glucose 78 74 - 99 mg/dL    BUN 39 (H) 7 - 18 mg/dL    Creatinine 8.19 (H) 0.60 - 1.30 mg/dL    Bun/Cre Ratio 5 (L) 12 - 20      Est, Glom Filt Rate 5 (L) >60 ml/min/1.73m2    Calcium 8.4 (L) 8.5 - 10.1 mg/dL    Total Bilirubin 0.5 0.2 - 1.0 mg/dL    AST 18 10 - 38 U/L    ALT 17 13 - 56 U/L    Alk Phosphatase 172 (H) 45 - 117 U/L    Total Protein 6.4 6.4 - 8.2 g/dL    Albumin 2.9 (L) 3.4 - 5.0 g/dL    Globulin 3.5 2.0 - 4.0 g/dL    Albumin/Globulin Ratio 0.8 0.8 - 1.7     Lactic Acid    Collection Time: 02/26/23  5:55 PM   Result Value Ref Range    Lactic Acid, Plasma 0.8 0.4 - 2.0 mmol/L   Lipase    Collection Time: 02/26/23  5:55 PM   Result Value Ref Range    Lipase 88 73 - 393 U/L             Radiologic Studies:  Non-plain film images such as CT, Ultrasound and MRI are read by the radiologist. Plain radiographic images are visualized and preliminarily interpreted by the ED Provider with the below findings:    *    Interpretation per the Radiologist below, if available at the time of this note:  No results found. PROCEDURES   Unless otherwise noted below, none  Performed by: Shawn Vera MD   Procedures      CRITICAL CARE TIME     EMERGENCY DEPARTMENT COURSE and DIFFERENTIAL DIAGNOSIS/MDM   Vitals:    Vitals:    02/26/23 1528   BP: (!) 178/77   Pulse: 77   Resp: 16   Temp: 97.6 °F (36.4 °C)   TempSrc: Oral   SpO2: 99%   Weight: 140 lb (63.5 kg)   Height: 5' 1\" (1.549 m)         Patient was given the following medications:  Medications   0.9 % sodium chloride bolus (250 mLs IntraVENous New Bag 2/26/23 1901)   ondansetron (ZOFRAN) injection 4 mg (4 mg IntraVENous Given 2/26/23 1901)       CONSULTS: (Who and What was discussed)  Hospitalist    Chronic Conditions: CKD  Social Determinants affecting Dx or Tx:   Counseling: HTN Hypertension    Records Reviewed (source and summary of external notes): Nursing Notes and Old Medical Records    MDM: Patient with CKD who presents with NV and generalized malais for 3 days.  DDx include electrolyte anomaly, CKD, Hyperkalemia, CHF, Sepsis, SBO, diverticulitis. PE showed a chronically ill looking woman. Lungs are clear and abdomen is soft and non tender. She has been nauseated without vomiting in ED. She received IV Zofran 4 mg and  cc IV bolus with good effect. Her labs are benign except for CKD but K is normal and CXR shows improved vascular congestion. Her CT Abd and pelvis is also read as nothing acute seen. Patient requested admission and I discussed with Hopsitalist who declined based on no criteria identified for admission. I discussed with her brother and daughter and they are in agreement to go home and take her ti dialysis tomorrow. She may have a bit of depression and I think she should see her PCP for that. Brother agrees to take her to PCP after dialysis. 7:27 pm Patient does not want to be discharged and now complains of SOB. She started forceful retching without actually vomiting. O2 sat 98% but she states she cannot breath. I discussed with Dr Dotty Kirkpatrick who will reevaluate after ABG RA. Patient and family informed. DC on hold for now. ED FINAL IMPRESSION     1. Nausea    2. Stage 5 chronic kidney disease on chronic dialysis ([facility] [address]    3. Chronic fatigue and malaise          DISPOSITION/PLAN   DISPOSITION Decision To Discharge 02/26/2023 07:19:15 PMHeld due to new complaint of Dyspnea at &;27 pm.      Discharge home family member    Discharge Note: The patient is stable for discharge home. the signs, symptom, diagnosis and discharge instructions have been discussed, understanding conveyed and agreed upon. The patient is to follow up as recommended or return to ED should their symptoms worsen.         DISCHARGE MEDICATIONS:     Medication List        START taking these medications      ondansetron 4 MG tablet  Commonly known as: ZOFRAN  Take 1 tablet by mouth 3 times daily as needed for Nausea or Vomiting            ASK your doctor about these medications      * albuterol (2.5 MG/3ML) 0.083% nebulizer solution  Commonly known as: PROVENTIL     * albuterol sulfate  (90 Base) MCG/ACT inhaler  Commonly known as: PROVENTIL;VENTOLIN;PROAIR     aluminum & magnesium hydroxide-simethicone 400-400-40 MG/5ML Susp  Commonly known as: MYLANTA     amiodarone 200 MG tablet  Commonly known as: CORDARONE     amLODIPine 10 MG tablet  Commonly known as: NORVASC     apixaban 2.5 MG Tabs tablet  Commonly known as: ELIQUIS     calcitRIOL 0.5 MCG capsule  Commonly known as: ROCALTROL     carvedilol 25 MG tablet  Commonly known as: COREG     cinacalcet 30 MG tablet  Commonly known as: SENSIPAR     Dexilant 60 MG Cpdr delayed release capsule  Generic drug: dexlansoprazole     dicyclomine 10 MG capsule  Commonly known as: BENTYL     docusate 100 MG Caps  Commonly known as: COLACE, DULCOLAX  Take 100 mg by mouth 2 times daily     doxercalciferol 4 MCG/2ML injection  Commonly known as: HECTOROL     EPINEPHrine 0.3 MG/0.3ML Soaj injection  Commonly known as: EPIPEN     epoetin amando 08798 UNIT/ML injection  Commonly known as: EPOGEN;PROCRIT     ESTRACE VAGINAL 0.1 MG/GM vaginal cream  Generic drug: estradiol     famotidine 20 MG tablet  Commonly known as: PEPCID     fluticasone 50 MCG/ACT nasal spray  Commonly known as: FLONASE     Hyoscyamine Sulfate SL 0.125 MG Subl     levothyroxine 75 MCG tablet  Commonly known as: SYNTHROID     losartan 50 MG tablet  Commonly known as: COZAAR     meclizine 25 MG tablet  Commonly known as: ANTIVERT     montelukast 10 MG tablet  Commonly known as: SINGULAIR     ondansetron 4 MG disintegrating tablet  Commonly known as: ZOFRAN-ODT     polyethylene glycol 17 GM/SCOOP powder  Commonly known as: GLYCOLAX     predniSONE 5 MG tablet  Commonly known as: DELTASONE     sevelamer 800 MG tablet  Commonly known as: RENVELA     simvastatin 20 MG tablet  Commonly known as: ZOCOR     sucralfate 1 GM tablet  Commonly known as: CARAFATE     traZODone 50 MG tablet  Commonly known as: DESYREL     Trelegy Ellipta 100-62.5-25 MCG/ACT Aepb inhaler  Generic drug: fluticasone-umeclidin-vilant  Inhale 1 puff into the lungs daily     valGANciclovir 450 MG tablet  Commonly known as: VALCYTE           * This list has 2 medication(s) that are the same as other medications prescribed for you. Read the directions carefully, and ask your doctor or other care provider to review them with you. Where to Get Your Medications        These medications were sent to [facility]  [place] [address]      Phone: [phone]   ondansetron 4 MG tablet           DISCONTINUED MEDICATIONS:  Current Discharge Medication List          I am the Primary Clinician of Record. Wilder Valadez MD (electronically signed)    (Please note that parts of this dictation were completed with voice recognition software. Quite often unanticipated grammatical, syntax, homophones, and other interpretive errors are inadvertently transcribed by the computer software. Please disregards these errors.  Please excuse any errors that have escaped final proofreading.)     Briana Sherman MD  02/26/23 [id number]

## 2023-02-26 NOTE — ED NOTES
Nursing staff and Concha Pearce from lab attempted to get blood/IV and was unable, MD made aware.       Rachael Mane, LEEROY  82/60/61 [id number]

## 2023-02-26 NOTE — ED PROVIDER NOTES
[facility] EMERGENCY DEPT  EMERGENCY DEPARTMENT HISTORY AND PHYSICAL EXAM        Date: 2/26/2023  Patient Name: Blanca Townsend  MRN: [id number]  [place] 1955  Date of evaluation: 2/26/2023  Provider: Juana Echols MD   Note Started: 7:19 PM 2/26/23    HISTORY OF PRESENT ILLNESS     Chief Complaint   Patient presents with    Fever    Emesis       History Provided By: Patient and Patient's Brother    HPI: Blanca Townsend, 74 y.o. female   Patient with PMHx ESRF on dialysis MF presents with fever, chills, weakness, NV which started after dialysis 2 days ago. According to brother, she has been unable to keep any food or drinks down. Has been getting progressively weaker. No hematemesis, constipation. She has been having vague diffuse abdominal pain or cramps. No diarrhea. The history is provided by the patient and a relative.        PAST MEDICAL HISTORY   Past Medical History:  Past Medical History:   Diagnosis Date    JULIET (acute kidney injury) ([facility] [address] 05/09/2019    Anxiety     Arrhythmia     Arthritis     Asthma     Asthma     Burning with urination     frequent uti    Calculus of gallbladder with acute cholecystitis without obstruction 10/09/2020    Cholecystitis 01/12/2019    Chronic kidney disease     dialysis    CMV (cytomegalovirus) antibody positive     Colitis 09/10/2022    COPD (chronic obstructive pulmonary disease) ([facility]     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    Dialysis patient [facility])     M/W/F    Diverticular disease of colon 08/21/2013    Dyspepsia and other specified disorders of function of stomach     stomach ulcer    Elevated troponin 10/21/2019    Encounter for cholecystectomy 05/06/2021 7/2020    Essential hypertension     GERD (gastroesophageal reflux disease)     History of chemotherapy     History of renal transplant 08/21/2013    (LD 2/12/2002)    HLD (hyperlipidemia)     Hypercholesteremia     Hypertension     Hypothyroidism 03/23/2022    Kidney transplant rejection     Menopause     Migraine Obesity     Osteoporosis     Pancreatitis 08/28/2022    Pyelonephritis 07/13/2020    Recurrent urinary tract infection 09/27/2016    Renal cyst 2002    kidney transplant     Spontaneous bacterial peritonitis (HCC) 01/16/2019    Ulcer        Past Surgical History:  Past Surgical History:   Procedure Laterality Date    CHOLECYSTECTOMY  02/2021    COLONOSCOPY  05/13/2022    COLONOSCOPY      Dr Encarnacion  5yrs ago    COLONOSCOPY      with Dr. Encarnacion    ENDOSCOPY VISIT OUTPATIENT      Dr Encarnacion  5 yrs ago    GI      ulcer    HEENT      sinus surg    KIDNEY TRANSPLANT      OTHER SURGICAL HISTORY  02/21/2022    SIGMOIDOSCOPY, FLEXIBLE;  Surgeon: Sukhdeep Thakur MD    TRANSPLANT      kidney transplant 2002    VASCULAR SURGERY      shunt in prep for dialysis       Family History:  Family History   Problem Relation Age of Onset    Diabetes Mother     Rheum Arthritis Mother     Hypertension Father     Diabetes Father     Thyroid Disease Sister     Colon Polyps Brother        Social History:  Social History     Tobacco Use    Smoking status: Never    Smokeless tobacco: Never   Substance Use Topics    Alcohol use: No    Drug use: No       Allergies:  Allergies   Allergen Reactions    Aspirin Rash     Other reaction(s): Unknown (comments)    Bromfenac Rash     Other reaction(s): Unknown (comments)    Cefepime Other (See Comments)     Neurological reaction    Ceftriaxone Rash    Copper Rash    Ibuprofen Rash     Other reaction(s): Unknown (comments)    Ketorolac Tromethamine Rash     Other reaction(s): Unknown (comments)    Nabumetone Rash     Other reaction(s): Unknown (comments)    Rifampin Rash     Other reaction(s): Unknown (comments)    Sulfamethoxazole-Trimethoprim Rash     Other reaction(s): Unknown (comments)    Vancomycin Rash     Other reaction(s): Unknown (comments)  Pt reports causes itching, takes benadryl prior to use. Pt denies anaphylaxis.    Requires benadryl with each vancomycin dose       PCP: Rashid RAMIREZ  Danna Segovia MD    Current Meds:   Previous Medications    ALBUTEROL (PROVENTIL) (2.5 MG/3ML) 0.083% NEBULIZER SOLUTION    USE 1 VIAL VIA NEBULIZER THREE TIMES DAILY    ALBUTEROL SULFATE HFA (PROVENTIL;VENTOLIN;PROAIR) 108 (90 BASE) MCG/ACT INHALER    Inhale 1 puff into the lungs every 4 hours as needed    ALUMINUM & MAGNESIUM HYDROXIDE-SIMETHICONE (MYLANTA) 400-400-40 MG/5ML SUSP    Take 10 mLs by mouth every 6 hours as needed    AMIODARONE (CORDARONE) 200 MG TABLET    TAKE 1 TABLET BY MOUTH EVERY DAY    AMLODIPINE (NORVASC) 10 MG TABLET    Take 1 tablet by mouth once daily    APIXABAN (ELIQUIS) 2.5 MG TABS TABLET    Take 2.5 mg by mouth 2 times daily    CALCITRIOL (ROCALTROL) 0.5 MCG CAPSULE    Take 0.5 mcg by mouth See Admin Instructions    CARVEDILOL (COREG) 25 MG TABLET    Take 25 mg by mouth 2 times daily (with meals)    CINACALCET (SENSIPAR) 30 MG TABLET    Take 30 mg by mouth daily    DEXLANSOPRAZOLE (DEXILANT) 60 MG CPDR DELAYED RELEASE CAPSULE    TAKE 1 CAPSULE BY MOUTH IN THE MORNING.     DICYCLOMINE (BENTYL) 10 MG CAPSULE    Take 10 mg by mouth daily    DOCUSATE SODIUM (COLACE, DULCOLAX) 100 MG CAPS    Take 100 mg by mouth 2 times daily    DOXERCALCIFEROL (HECTOROL) 4 MCG/2ML INJECTION    Infuse 6 mcg intravenously    EPINEPHRINE (EPIPEN) 0.3 MG/0.3ML SOAJ INJECTION    INJECT 0.3 ML INTRAMUSCULARLY ONCE AS NEEDED FOR ALLERGIC RESPONSE    EPOETIN PINO (EPOGEN;PROCRIT) 20165 UNIT/ML INJECTION    Inject 10,000 Units into the skin    ESTRADIOL (ESTRACE VAGINAL) 0.1 MG/GM VAGINAL CREAM    INSERT 2 GRAMS INTO VAGINA EVERY MONDAY AND THURSDAY    FAMOTIDINE (PEPCID) 20 MG TABLET    Take 20 mg by mouth daily    FLUTICASONE (FLONASE) 50 MCG/ACT NASAL SPRAY    USE 1 SPRAY IN EACH NOSTRIL EVERY MORNING    FLUTICASONE-UMECLIDIN-VILANT (TRELEGY ELLIPTA) 100-62.5-25 MCG/ACT AEPB INHALER    Inhale 1 puff into the lungs daily    HYOSCYAMINE SULFATE SL 0.125 MG SUBL    Place 0.125 mg under the tongue every 4 hours as needed LEVOTHYROXINE (SYNTHROID) 75 MCG TABLET    Take 75 mcg by mouth every morning (before breakfast)    LOSARTAN (COZAAR) 50 MG TABLET    Take 50 mg by mouth daily    MECLIZINE (ANTIVERT) 25 MG TABLET    TAKE 1 TABLET THREE TIMES DAILY AS NEEDED FOR DIZZINESS    MONTELUKAST (SINGULAIR) 10 MG TABLET    Take 10 mg by mouth daily    ONDANSETRON (ZOFRAN-ODT) 4 MG DISINTEGRATING TABLET    Take 8 mg by mouth every 8 hours as needed    POLYETHYLENE GLYCOL (GLYCOLAX) 17 GM/SCOOP POWDER    Take 17 g by mouth 2 times daily    PREDNISONE (DELTASONE) 5 MG TABLET    Take 1 tablet by mouth daily    SEVELAMER (RENVELA) 800 MG TABLET    Take 800 mg by mouth 3 times daily    SIMVASTATIN (ZOCOR) 20 MG TABLET    TAKE 1 TABLET BY MOUTH DAILY AS DIRECTED. SUCRALFATE (CARAFATE) 1 GM TABLET    TAKE 1 TABLET BY MOUTH FOUR TIMES DAILY    TRAZODONE (DESYREL) 50 MG TABLET    Take 50 mg by mouth    VALGANCICLOVIR (VALCYTE) 450 MG TABLET    Take 900 mg by mouth daily       REVIEW OF SYSTEMS   Review of Systems   Constitutional:  Positive for appetite change, chills and fever. HENT: Negative. Respiratory: Negative. Cardiovascular: Negative. Gastrointestinal:  Positive for abdominal pain, nausea and vomiting. Negative for constipation and diarrhea. Genitourinary: Negative. Musculoskeletal: Negative. Neurological: Negative. All other systems reviewed and are negative. Positives and Pertinent negatives as per HPI. PHYSICAL EXAM     Vitals:    02/26/23 1528   BP: (!) 178/77   Pulse: 77   Resp: 16   Temp: 97.6 °F (36.4 °C)   TempSrc: Oral   SpO2: 99%   Weight: 140 lb (63.5 kg)   Height: 5' 1\" (1.549 m)     Physical Exam  Vitals and nursing note reviewed. Constitutional:       Appearance: She is not ill-appearing. HENT:      Head: Normocephalic. Cardiovascular:      Rate and Rhythm: Normal rate and regular rhythm. Pulses: Normal pulses. Heart sounds: Normal heart sounds.    Pulmonary:      Effort: Pulmonary effort is normal.      Breath sounds: Normal breath sounds. Abdominal:      Palpations: Abdomen is soft. Tenderness: There is no abdominal tenderness. Musculoskeletal:      Cervical back: Normal range of motion and neck supple. Comments: LUE AV fistula   Neurological:      General: No focal deficit present. Mental Status: She is alert and oriented to person, place, and time.        SCREENINGS                No data recorded     LAB, EKG AND DIAGNOSTIC RESULTS   Labs:  Recent Results (from the past 12 hour(s))   Rapid influenza A/B antigens    Collection Time: 02/26/23  3:28 PM    Specimen: Nasal Washing   Result Value Ref Range    Influenza A Ag Negative Negative      Influenza B Ag Negative Negative     CBC with Auto Differential    Collection Time: 02/26/23  5:55 PM   Result Value Ref Range    WBC 5.6 4.6 - 13.2 K/uL    RBC 3.49 (L) 4.20 - 5.30 M/uL    Hemoglobin 10.4 (L) 12.0 - 16.0 g/dL    Hematocrit 32.8 (L) 35.0 - 45.0 %    MCV 94.0 78.0 - 100.0 FL    MCH 29.8 24.0 - 34.0 PG    MCHC 31.7 31.0 - 37.0 g/dL    RDW 17.6 (H) 11.6 - 14.5 %    Platelets 989 193 - 915 K/uL    MPV 9.1 (L) 9.2 - 11.8 FL    Nucleated RBCs 0.0 0.0  WBC    nRBC 0.00 0.00 - 0.01 K/uL    Seg Neutrophils 78 (H) 40 - 73 %    Lymphocytes 16 (L) 21 - 52 %    Monocytes 4 3 - 10 %    Eosinophils % 0 0 - 5 %    Basophils 1 0 - 2 %    Immature Granulocytes 1 (H) 0 - 0.5 %    Segs Absolute 4.4 1.8 - 8.0 K/UL    Absolute Lymph # 0.9 0.9 - 3.6 K/UL    Absolute Mono # 0.2 0.05 - 1.2 K/UL    Absolute Eos # 0.0 0.0 - 0.4 K/UL    Basophils Absolute 0.1 0.0 - 0.1 K/UL    Absolute Immature Granulocyte 0.0 0.00 - 0.04 K/UL    Differential Type AUTOMATED     CMP    Collection Time: 02/26/23  5:55 PM   Result Value Ref Range    Sodium 135 (L) 136 - 145 mmol/L    Potassium 4.4 3.5 - 5.5 mmol/L    Chloride 96 (L) 100 - 111 mmol/L    CO2 23 21 - 32 mmol/L    Anion Gap 16 3.0 - 18.0 mmol/L    Glucose 78 74 - 99 mg/dL    BUN 39 (H) 7 - 18 mg/dL    Creatinine 8.19 (H) 0.60 - 1.30 mg/dL    Bun/Cre Ratio 5 (L) 12 - 20      Est, Glom Filt Rate 5 (L) >60 ml/min/1.73m2    Calcium 8.4 (L) 8.5 - 10.1 mg/dL    Total Bilirubin 0.5 0.2 - 1.0 mg/dL    AST 18 10 - 38 U/L    ALT 17 13 - 56 U/L    Alk Phosphatase 172 (H) 45 - 117 U/L    Total Protein 6.4 6.4 - 8.2 g/dL    Albumin 2.9 (L) 3.4 - 5.0 g/dL    Globulin 3.5 2.0 - 4.0 g/dL    Albumin/Globulin Ratio 0.8 0.8 - 1.7     Lactic Acid    Collection Time: 02/26/23  5:55 PM   Result Value Ref Range    Lactic Acid, Plasma 0.8 0.4 - 2.0 mmol/L   Lipase    Collection Time: 02/26/23  5:55 PM   Result Value Ref Range    Lipase 88 73 - 393 U/L             Radiologic Studies:  Non-plain film images such as CT, Ultrasound and MRI are read by the radiologist. Plain radiographic images are visualized and preliminarily interpreted by the ED Provider with the below findings:    *    Interpretation per the Radiologist below, if available at the time of this note:  No results found. PROCEDURES   Unless otherwise noted below, none  Performed by: Aria Pathak MD   Procedures      CRITICAL CARE TIME     EMERGENCY DEPARTMENT COURSE and DIFFERENTIAL DIAGNOSIS/MDM   Vitals:    Vitals:    02/26/23 1528   BP: (!) 178/77   Pulse: 77   Resp: 16   Temp: 97.6 °F (36.4 °C)   TempSrc: Oral   SpO2: 99%   Weight: 140 lb (63.5 kg)   Height: 5' 1\" (1.549 m)         Patient was given the following medications:  Medications   0.9 % sodium chloride bolus (250 mLs IntraVENous New Bag 2/26/23 1901)   ondansetron (ZOFRAN) injection 4 mg (4 mg IntraVENous Given 2/26/23 1901)       CONSULTS: (Who and What was discussed)  Hospitalist    Chronic Conditions: CKD  Social Determinants affecting Dx or Tx:   Counseling: HTN Hypertension    Records Reviewed (source and summary of external notes): Nursing Notes and Old Medical Records    MDM: Patient with CKD who presents with NV and generalized malais for 3 days.  DDx include electrolyte anomaly, CKD, Hyperkalemia, CHF, Sepsis, SBO, diverticulitis. PE showed a chronically ill looking woman. Lungs are clear and abdomen is soft and non tender. She has been nauseated without vomiting in ED. She received IV Zofran 4 mg and  cc IV bolus with good effect. Her labs are benign except for CKD but K is normal and CXR shows improved vascular congestion. Her CT Abd and pelvis is also read as nothing acute seen. Patient requested admission and I discussed with Hopsitalist who declined based on no criteria identified for admission. I discussed with her brother and daughter and they are in agreement to go home and take her ti dialysis tomorrow. She may have a bit of depression and I think she should see her PCP for that. Brother agrees to take her to PCP after dialysis. ED FINAL IMPRESSION     1. Nausea    2. Stage 5 chronic kidney disease on chronic dialysis ([facility] [address]    3. Chronic fatigue and malaise          DISPOSITION/PLAN   DISPOSITION Decision To Discharge 02/26/2023 07:19:15 PM      Discharge home family member    Discharge Note: The patient is stable for discharge home. the signs, symptom, diagnosis and discharge instructions have been discussed, understanding conveyed and agreed upon. The patient is to follow up as recommended or return to ED should their symptoms worsen.         DISCHARGE MEDICATIONS:     Medication List        START taking these medications      ondansetron 4 MG tablet  Commonly known as: ZOFRAN  Take 1 tablet by mouth 3 times daily as needed for Nausea or Vomiting            ASK your doctor about these medications      * albuterol (2.5 MG/3ML) 0.083% nebulizer solution  Commonly known as: PROVENTIL     * albuterol sulfate  (90 Base) MCG/ACT inhaler  Commonly known as: PROVENTIL;VENTOLIN;PROAIR     aluminum & magnesium hydroxide-simethicone 400-400-40 MG/5ML Susp  Commonly known as: MYLANTA     amiodarone 200 MG tablet  Commonly known as: CORDARONE     amLODIPine 10 MG tablet  Commonly known as: NORVASC     apixaban 2.5 MG Tabs tablet  Commonly known as: ELIQUIS     calcitRIOL 0.5 MCG capsule  Commonly known as: ROCALTROL     carvedilol 25 MG tablet  Commonly known as: COREG     cinacalcet 30 MG tablet  Commonly known as: SENSIPAR     Dexilant 60 MG Cpdr delayed release capsule  Generic drug: dexlansoprazole     dicyclomine 10 MG capsule  Commonly known as: BENTYL     docusate 100 MG Caps  Commonly known as: COLACE, DULCOLAX  Take 100 mg by mouth 2 times daily     doxercalciferol 4 MCG/2ML injection  Commonly known as: HECTOROL     EPINEPHrine 0.3 MG/0.3ML Soaj injection  Commonly known as: EPIPEN     epoetin amando 61454 UNIT/ML injection  Commonly known as: EPOGEN;PROCRIT     ESTRACE VAGINAL 0.1 MG/GM vaginal cream  Generic drug: estradiol     famotidine 20 MG tablet  Commonly known as: PEPCID     fluticasone 50 MCG/ACT nasal spray  Commonly known as: FLONASE     Hyoscyamine Sulfate SL 0.125 MG Subl     levothyroxine 75 MCG tablet  Commonly known as: SYNTHROID     losartan 50 MG tablet  Commonly known as: COZAAR     meclizine 25 MG tablet  Commonly known as: ANTIVERT     montelukast 10 MG tablet  Commonly known as: SINGULAIR     ondansetron 4 MG disintegrating tablet  Commonly known as: ZOFRAN-ODT     polyethylene glycol 17 GM/SCOOP powder  Commonly known as: GLYCOLAX     predniSONE 5 MG tablet  Commonly known as: DELTASONE     sevelamer 800 MG tablet  Commonly known as: RENVELA     simvastatin 20 MG tablet  Commonly known as: ZOCOR     sucralfate 1 GM tablet  Commonly known as: CARAFATE     traZODone 50 MG tablet  Commonly known as: DESYREL     Trelegy Ellipta 100-62.5-25 MCG/ACT Aepb inhaler  Generic drug: fluticasone-umeclidin-vilant  Inhale 1 puff into the lungs daily     valGANciclovir 450 MG tablet  Commonly known as: VALCYTE           * This list has 2 medication(s) that are the same as other medications prescribed for you.  Read the directions carefully, and ask your doctor or other care provider to review them with you. Where to Get Your Medications        These medications were sent to [address] [place] [address], [address] [place] [address]  [place] [address]      Phone: [phone]   ondansetron 4 MG tablet           DISCONTINUED MEDICATIONS:  Current Discharge Medication List          I am the Primary Clinician of Record. Caryle Gearing, MD (electronically signed)    (Please note that parts of this dictation were completed with voice recognition software. Quite often unanticipated grammatical, syntax, homophones, and other interpretive errors are inadvertently transcribed by the computer software. Please disregards these errors.  Please excuse any errors that have escaped final proofreading.)     Lucía Bryan MD  02/26/23 0963

## 2023-02-27 NOTE — PROGRESS NOTES
Pt presenting with nausea/vomit. No missed HD, last HD Friday,   Today her k 4.4, CO2 23, lactic acid is 0.8, ct abd/pelvis unremarkable. Decreased vascular congestion on xray, Bun 39 Cr 8, wbc is 5.6, vitals stable, next outpatient HD due tomorrow-no indications for inpatient admit.  No vomit since 6hr ED stay per ED Rn.

## 2023-03-01 ENCOUNTER — APPOINTMENT (OUTPATIENT)
Age: 68
DRG: 682 | End: 2023-03-01
Payer: MEDICARE

## 2023-03-01 ENCOUNTER — HOSPITAL ENCOUNTER (INPATIENT)
Age: 68
LOS: 7 days | Discharge: ANOTHER HOSPITAL | DRG: 682 | End: 2023-03-09
Attending: EMERGENCY MEDICINE | Admitting: INTERNAL MEDICINE
Payer: MEDICARE

## 2023-03-01 DIAGNOSIS — Z99.2 ESRD NEEDING DIALYSIS (HCC): ICD-10-CM

## 2023-03-01 DIAGNOSIS — E16.2 HYPOGLYCEMIA: Primary | ICD-10-CM

## 2023-03-01 DIAGNOSIS — N18.6 ESRD NEEDING DIALYSIS (HCC): ICD-10-CM

## 2023-03-01 DIAGNOSIS — R11.2 NAUSEA AND VOMITING, UNSPECIFIED VOMITING TYPE: ICD-10-CM

## 2023-03-01 LAB
ALBUMIN SERPL-MCNC: 3 G/DL (ref 3.4–5)
ALBUMIN/GLOB SERPL: 0.7 (ref 0.8–1.7)
ALP SERPL-CCNC: 221 U/L (ref 45–117)
ALT SERPL-CCNC: 14 U/L (ref 13–56)
ANION GAP SERPL CALC-SCNC: 16 MMOL/L (ref 3–18)
AST SERPL W P-5'-P-CCNC: 14 U/L (ref 10–38)
BASOPHILS # BLD: 0.1 K/UL (ref 0–0.1)
BASOPHILS NFR BLD: 2 % (ref 0–2)
BILIRUB DIRECT SERPL-MCNC: 0.2 MG/DL (ref 0–0.2)
BILIRUB SERPL-MCNC: 0.6 MG/DL (ref 0.2–1)
BUN SERPL-MCNC: 46 MG/DL (ref 7–18)
BUN/CREAT SERPL: 4 (ref 12–20)
CA-I BLD-MCNC: 8.8 MG/DL (ref 8.5–10.1)
CHLORIDE SERPL-SCNC: 95 MMOL/L (ref 100–111)
CO2 SERPL-SCNC: 20 MMOL/L (ref 21–32)
CREAT SERPL-MCNC: 10.8 MG/DL (ref 0.6–1.3)
DIFFERENTIAL METHOD BLD: ABNORMAL
EKG ATRIAL RATE: 89 BPM
EKG DIAGNOSIS: NORMAL
EKG P AXIS: 66 DEGREES
EKG P-R INTERVAL: 221 MS
EKG Q-T INTERVAL: 406 MS
EKG QRS DURATION: 104 MS
EKG QTC CALCULATION (BAZETT): 495 MS
EKG R AXIS: 64 DEGREES
EKG T AXIS: 74 DEGREES
EKG VENTRICULAR RATE: 89 BPM
EOSINOPHIL # BLD: 0 K/UL (ref 0–0.4)
EOSINOPHIL NFR BLD: 1 % (ref 0–5)
ERYTHROCYTE [DISTWIDTH] IN BLOOD BY AUTOMATED COUNT: 18.5 % (ref 11.6–14.5)
GLOBULIN SER CALC-MCNC: 4.3 G/DL (ref 2–4)
GLUCOSE BLD STRIP.AUTO-MCNC: 105 MG/DL (ref 70–110)
GLUCOSE BLD STRIP.AUTO-MCNC: 107 MG/DL (ref 70–110)
GLUCOSE BLD STRIP.AUTO-MCNC: 368 MG/DL (ref 70–110)
GLUCOSE BLD STRIP.AUTO-MCNC: 67 MG/DL (ref 70–110)
GLUCOSE BLD STRIP.AUTO-MCNC: 70 MG/DL (ref 70–110)
GLUCOSE BLD STRIP.AUTO-MCNC: 71 MG/DL (ref 70–110)
GLUCOSE BLD STRIP.AUTO-MCNC: 74 MG/DL (ref 70–110)
GLUCOSE BLD STRIP.AUTO-MCNC: 81 MG/DL (ref 70–110)
GLUCOSE BLD STRIP.AUTO-MCNC: 86 MG/DL (ref 70–110)
GLUCOSE BLD STRIP.AUTO-MCNC: 97 MG/DL (ref 70–110)
GLUCOSE SERPL-MCNC: 120 MG/DL (ref 74–99)
HCT VFR BLD AUTO: 38.4 % (ref 35–45)
HGB BLD-MCNC: 12.2 G/DL (ref 12–16)
IMM GRANULOCYTES # BLD AUTO: 0 K/UL (ref 0–0.04)
IMM GRANULOCYTES NFR BLD AUTO: 1 % (ref 0–0.5)
LACTATE SERPL-SCNC: 0.9 MMOL/L (ref 0.4–2)
LIPASE SERPL-CCNC: 86 U/L (ref 73–393)
LYMPHOCYTES # BLD: 0.6 K/UL (ref 0.9–3.6)
LYMPHOCYTES NFR BLD: 14 % (ref 21–52)
MCH RBC QN AUTO: 29.8 PG (ref 24–34)
MCHC RBC AUTO-ENTMCNC: 31.8 G/DL (ref 31–37)
MCV RBC AUTO: 93.9 FL (ref 78–100)
MONOCYTES # BLD: 0.3 K/UL (ref 0.05–1.2)
MONOCYTES NFR BLD: 6 % (ref 3–10)
NEUTS SEG # BLD: 3.6 K/UL (ref 1.8–8)
NEUTS SEG NFR BLD: 76 % (ref 40–73)
NRBC # BLD: 0 K/UL (ref 0–0.01)
NRBC BLD-RTO: 0 PER 100 WBC
PERFORMED BY:: ABNORMAL
PERFORMED BY:: ABNORMAL
PERFORMED BY:: NORMAL
PLATELET # BLD AUTO: 246 K/UL (ref 135–420)
PMV BLD AUTO: 8.8 FL (ref 9.2–11.8)
POTASSIUM SERPL-SCNC: 5.3 MMOL/L (ref 3.5–5.5)
PROT SERPL-MCNC: 7.3 G/DL (ref 6.4–8.2)
RBC # BLD AUTO: 4.09 M/UL (ref 4.2–5.3)
SODIUM SERPL-SCNC: 131 MMOL/L (ref 136–145)
TROPONIN I SERPL HS-MCNC: 21 NG/L (ref 0–54)
WBC # BLD AUTO: 4.6 K/UL (ref 4.6–13.2)

## 2023-03-01 PROCEDURE — 90935 HEMODIALYSIS ONE EVALUATION: CPT

## 2023-03-01 PROCEDURE — 83690 ASSAY OF LIPASE: CPT

## 2023-03-01 PROCEDURE — 96365 THER/PROPH/DIAG IV INF INIT: CPT | Performed by: EMERGENCY MEDICINE

## 2023-03-01 PROCEDURE — A4216 STERILE WATER/SALINE, 10 ML: HCPCS | Performed by: EMERGENCY MEDICINE

## 2023-03-01 PROCEDURE — 99285 EMERGENCY DEPT VISIT HI MDM: CPT | Performed by: EMERGENCY MEDICINE

## 2023-03-01 PROCEDURE — 84484 ASSAY OF TROPONIN QUANT: CPT

## 2023-03-01 PROCEDURE — G0378 HOSPITAL OBSERVATION PER HR: HCPCS

## 2023-03-01 PROCEDURE — 71045 X-RAY EXAM CHEST 1 VIEW: CPT

## 2023-03-01 PROCEDURE — 5A1D70Z PERFORMANCE OF URINARY FILTRATION, INTERMITTENT, LESS THAN 6 HOURS PER DAY: ICD-10-PCS | Performed by: INTERNAL MEDICINE

## 2023-03-01 PROCEDURE — 80076 HEPATIC FUNCTION PANEL: CPT

## 2023-03-01 PROCEDURE — 5A12012 PERFORMANCE OF CARDIAC OUTPUT, SINGLE, MANUAL: ICD-10-PCS | Performed by: INTERNAL MEDICINE

## 2023-03-01 PROCEDURE — 82962 GLUCOSE BLOOD TEST: CPT

## 2023-03-01 PROCEDURE — 5A1935Z RESPIRATORY VENTILATION, LESS THAN 24 CONSECUTIVE HOURS: ICD-10-PCS | Performed by: EMERGENCY MEDICINE

## 2023-03-01 PROCEDURE — 96376 TX/PRO/DX INJ SAME DRUG ADON: CPT | Performed by: EMERGENCY MEDICINE

## 2023-03-01 PROCEDURE — 93005 ELECTROCARDIOGRAM TRACING: CPT | Performed by: EMERGENCY MEDICINE

## 2023-03-01 PROCEDURE — 0BH17EZ INSERTION OF ENDOTRACHEAL AIRWAY INTO TRACHEA, VIA NATURAL OR ARTIFICIAL OPENING: ICD-10-PCS | Performed by: EMERGENCY MEDICINE

## 2023-03-01 PROCEDURE — 2580000003 HC RX 258: Performed by: EMERGENCY MEDICINE

## 2023-03-01 PROCEDURE — 2500000003 HC RX 250 WO HCPCS: Performed by: EMERGENCY MEDICINE

## 2023-03-01 PROCEDURE — 36415 COLL VENOUS BLD VENIPUNCTURE: CPT

## 2023-03-01 PROCEDURE — 85025 COMPLETE CBC W/AUTO DIFF WBC: CPT

## 2023-03-01 PROCEDURE — 6360000002 HC RX W HCPCS: Performed by: EMERGENCY MEDICINE

## 2023-03-01 PROCEDURE — 80048 BASIC METABOLIC PNL TOTAL CA: CPT

## 2023-03-01 PROCEDURE — 6360000002 HC RX W HCPCS: Performed by: INTERNAL MEDICINE

## 2023-03-01 PROCEDURE — 83605 ASSAY OF LACTIC ACID: CPT

## 2023-03-01 PROCEDURE — 6370000000 HC RX 637 (ALT 250 FOR IP): Performed by: INTERNAL MEDICINE

## 2023-03-01 PROCEDURE — 96375 TX/PRO/DX INJ NEW DRUG ADDON: CPT | Performed by: EMERGENCY MEDICINE

## 2023-03-01 RX ORDER — PANTOPRAZOLE SODIUM 40 MG/1
40 TABLET, DELAYED RELEASE ORAL
Status: DISCONTINUED | OUTPATIENT
Start: 2023-03-01 | End: 2023-03-09 | Stop reason: HOSPADM

## 2023-03-01 RX ORDER — CINACALCET 30 MG/1
30 TABLET, FILM COATED ORAL DAILY
Status: DISCONTINUED | OUTPATIENT
Start: 2023-03-01 | End: 2023-03-09 | Stop reason: HOSPADM

## 2023-03-01 RX ORDER — ONDANSETRON 2 MG/ML
4 INJECTION INTRAMUSCULAR; INTRAVENOUS
Status: COMPLETED | OUTPATIENT
Start: 2023-03-01 | End: 2023-03-01

## 2023-03-01 RX ORDER — ALBUTEROL SULFATE 90 UG/1
1 AEROSOL, METERED RESPIRATORY (INHALATION) EVERY 4 HOURS PRN
Status: DISCONTINUED | OUTPATIENT
Start: 2023-03-01 | End: 2023-03-09 | Stop reason: HOSPADM

## 2023-03-01 RX ORDER — ONDANSETRON 2 MG/ML
4 INJECTION INTRAMUSCULAR; INTRAVENOUS EVERY 6 HOURS PRN
Status: DISCONTINUED | OUTPATIENT
Start: 2023-03-01 | End: 2023-03-08

## 2023-03-01 RX ORDER — LOSARTAN POTASSIUM 25 MG/1
50 TABLET ORAL DAILY
Status: DISCONTINUED | OUTPATIENT
Start: 2023-03-01 | End: 2023-03-09 | Stop reason: HOSPADM

## 2023-03-01 RX ORDER — MONTELUKAST SODIUM 10 MG/1
10 TABLET ORAL DAILY
Status: DISCONTINUED | OUTPATIENT
Start: 2023-03-01 | End: 2023-03-09 | Stop reason: HOSPADM

## 2023-03-01 RX ORDER — MAGNESIUM HYDROXIDE/ALUMINUM HYDROXICE/SIMETHICONE 120; 1200; 1200 MG/30ML; MG/30ML; MG/30ML
10 SUSPENSION ORAL EVERY 6 HOURS PRN
Status: DISCONTINUED | OUTPATIENT
Start: 2023-03-01 | End: 2023-03-09 | Stop reason: HOSPADM

## 2023-03-01 RX ORDER — CARVEDILOL 12.5 MG/1
25 TABLET ORAL 2 TIMES DAILY WITH MEALS
Status: DISCONTINUED | OUTPATIENT
Start: 2023-03-01 | End: 2023-03-09

## 2023-03-01 RX ORDER — AMLODIPINE BESYLATE 5 MG/1
10 TABLET ORAL DAILY
Status: DISCONTINUED | OUTPATIENT
Start: 2023-03-01 | End: 2023-03-02

## 2023-03-01 RX ORDER — AMIODARONE HYDROCHLORIDE 200 MG/1
200 TABLET ORAL DAILY
Status: DISCONTINUED | OUTPATIENT
Start: 2023-03-01 | End: 2023-03-02

## 2023-03-01 RX ORDER — 0.9 % SODIUM CHLORIDE 0.9 %
250 INTRAVENOUS SOLUTION INTRAVENOUS ONCE
Status: COMPLETED | OUTPATIENT
Start: 2023-03-02 | End: 2023-03-02

## 2023-03-01 RX ORDER — SEVELAMER CARBONATE 800 MG/1
800 TABLET, FILM COATED ORAL 3 TIMES DAILY
Status: DISCONTINUED | OUTPATIENT
Start: 2023-03-01 | End: 2023-03-09 | Stop reason: HOSPADM

## 2023-03-01 RX ORDER — LEVOTHYROXINE SODIUM 0.07 MG/1
75 TABLET ORAL
Status: DISCONTINUED | OUTPATIENT
Start: 2023-03-01 | End: 2023-03-02

## 2023-03-01 RX ORDER — DEXTROSE MONOHYDRATE 100 MG/ML
INJECTION, SOLUTION INTRAVENOUS CONTINUOUS PRN
Status: DISCONTINUED | OUTPATIENT
Start: 2023-03-01 | End: 2023-03-09 | Stop reason: HOSPADM

## 2023-03-01 RX ADMIN — ONDANSETRON 4 MG: 2 INJECTION INTRAMUSCULAR; INTRAVENOUS at 11:45

## 2023-03-01 RX ADMIN — ONDANSETRON 4 MG: 2 INJECTION INTRAMUSCULAR; INTRAVENOUS at 21:07

## 2023-03-01 RX ADMIN — DEXTROSE MONOHYDRATE 125 ML: 100 INJECTION, SOLUTION INTRAVENOUS at 04:57

## 2023-03-01 RX ADMIN — Medication 16 G: at 19:45

## 2023-03-01 RX ADMIN — ONDANSETRON 4 MG: 2 INJECTION INTRAMUSCULAR; INTRAVENOUS at 06:53

## 2023-03-01 RX ADMIN — ONDANSETRON 4 MG: 2 INJECTION INTRAMUSCULAR; INTRAVENOUS at 04:53

## 2023-03-01 RX ADMIN — ONDANSETRON 4 MG: 2 INJECTION INTRAMUSCULAR; INTRAVENOUS at 17:04

## 2023-03-01 RX ADMIN — GLUCAGON 1 MG: KIT at 19:48

## 2023-03-01 RX ADMIN — FAMOTIDINE 20 MG: 10 INJECTION, SOLUTION INTRAVENOUS at 06:53

## 2023-03-01 ASSESSMENT — ENCOUNTER SYMPTOMS
BACK PAIN: 0
NAUSEA: 1
ABDOMINAL PAIN: 1
BLOOD IN STOOL: 0
VOMITING: 1
DIARRHEA: 1

## 2023-03-01 ASSESSMENT — PAIN SCALES - GENERAL
PAINLEVEL_OUTOF10: 0
PAINLEVEL_OUTOF10: 4
PAINLEVEL_OUTOF10: 0
PAINLEVEL_OUTOF10: 9
PAINLEVEL_OUTOF10: 8
PAINLEVEL_OUTOF10: 0
PAINLEVEL_OUTOF10: 6
PAINLEVEL_OUTOF10: 0
PAINLEVEL_OUTOF10: 0

## 2023-03-01 ASSESSMENT — PAIN DESCRIPTION - ONSET: ONSET: GRADUAL

## 2023-03-01 ASSESSMENT — PAIN - FUNCTIONAL ASSESSMENT
PAIN_FUNCTIONAL_ASSESSMENT: 0-10

## 2023-03-01 ASSESSMENT — PAIN DESCRIPTION - PAIN TYPE: TYPE: ACUTE PAIN

## 2023-03-01 ASSESSMENT — PAIN DESCRIPTION - FREQUENCY: FREQUENCY: INTERMITTENT

## 2023-03-01 ASSESSMENT — PAIN DESCRIPTION - LOCATION
LOCATION: ABDOMEN
LOCATION: ABDOMEN

## 2023-03-01 ASSESSMENT — PAIN DESCRIPTION - DESCRIPTORS: DESCRIPTORS: ACHING

## 2023-03-01 NOTE — ED TRIAGE NOTES
Patient states she hasn't felt good in 2 weeks. Reports intermittent abdominal pain with N/V/D. EMS states blood glucose was low as well of 42 and oral glucose given with increase to 53. Patient reports she isn't able to keep anything down.

## 2023-03-01 NOTE — CARE COORDINATION
Case Management Assessment  Initial Evaluation    Date/Time of Evaluation: 3/1/2023 11:10 AM  Assessment Completed by: Jourdan Chandra    If patient is discharged prior to next notation, then this note serves as note for discharge by case management. Patient Name: Dilia Griffin                   YOB: 1955  Diagnosis: Hypoglycemia [E16.2]  Nausea and vomiting, unspecified vomiting type [R11.2]                   Date / Time: 3/1/2023  4:08 AM    Patient Admission Status: Observation   Readmission Risk (Low < 19, Mod (19-27), High > 27): No data recorded  Current PCP: Mary Hawthorne MD  PCP verified by CM? Chart Reviewed: Yes      History Provided by: Patient  Patient Orientation: Alert and Oriented    Patient Cognition: Alert    Hospitalization in the last 30 days (Readmission):  Yes    If yes, Readmission Assessment in  Navigator will be completed. Advance Directives:      Code Status: Prior   Patient's Primary Decision Maker is:        Discharge Planning:    Patient lives with:   Type of Home:    Primary Care Giver:    Patient Support Systems include:     Current Financial resources:    Current community resources:    Current services prior to admission:              Current DME:              Type of Home Care services:       ADLS  Prior functional level:    Current functional level:      PT AM-PAC:   /24  OT AM-PAC:   /24    Family can provide assistance at DC: Yes  Would you like Case Management to discuss the discharge plan with any other family members/significant others, and if so, who?     Plans to Return to Present Housing: Yes  Other Identified Issues/Barriers to RETURNING to current housing: yes  Potential Assistance needed at discharge:              Potential DME:    Patient expects to discharge to:    Plan for transportation at discharge:      Financial    Payor: Amita Shearer / Plan: Beverly Elizabeth / Product Type: *No Product type* /     Does insurance require precert for SNF: Yes    Potential assistance Purchasing Medications:    Meds-to-Beds request:        [facility] [address] [facility]  [address] [facility]  Phone: [phone] Fax: [phone]    [address] Mail Delivery - [facility] [place] [phone] - F [phone]  [address] [address]  Phone: [phone] Fax: [phone]      Notes:    Factors facilitating achievement of predicted outcomes: Cooperative and Pleasant    Barriers to discharge: Additional Case Management Notes: Patient lives with sister and is independent of ADLs. Goes to HD and has transportation to HD and other appointments. No DME or HH needs at this time. CM following for DC needs. CM following for admission. The Plan for Transition of Care is related to the following treatment goals of Hypoglycemia [E16.2]  Nausea and vomiting, unspecified vomiting type [S96.4]    IF APPLICABLE: The Patient and/or patient representative Katiana Miranda and her family were provided with a choice of provider and agrees with the discharge plan. Freedom of choice list with basic dialogue that supports the patient's individualized plan of care/goals and shares the quality data associated with the providers was provided to:     Patient Representative Name:       The Patient and/or Patient Representative Agree with the Discharge Plan?       Enmanuel Carrasco  Case Management Department  Ph: [phone] Fax:

## 2023-03-01 NOTE — PLAN OF CARE
Problem: Safety - Adult  Goal: Free from fall injury  Outcome: Progressing     Problem: Pain  Goal: Verbalizes/displays adequate comfort level or baseline comfort level  Outcome: Not Progressing

## 2023-03-01 NOTE — PROGRESS NOTES
Soiled linen noted but patient refused to have linen changed. She stated that she felt nauseated and would like to wait until later. PRN Zofran given.

## 2023-03-01 NOTE — ED NOTES
Patient resting on stretcher. Denies pain and N/V at this time.      [facility]  03/01/23 3293

## 2023-03-01 NOTE — PROGRESS NOTES
0750-New admission from: ER    PATIENT IS AN ESRD  PATIENT REQUIRING DIALYSIS    Supervisor called Nephrologist for dialysis treatment     Patient assessment: SEE FLOW SHEET; NO changes from ER    Patient a/o x 4  + nausea, - vomiting  RA-lungs sound clear  No BM    POC reviewed with patient  Patient refused SM meds    IV: RAC infiltrated- iv removed  Supervisor made aware of need for IV  Patient refused to allow supervisor to stick her    0950-ER MD present to placed EJ    Tele: [facility]  Wounds:NONE  Mobility: MIN ASSIST    Vitals: HTN NOTED  Pain: ABD  LUE DIALYSIS FISTULA      Fall Interventions: Gripper socks, bed in lowest position, call light in reach     1000- call received from Nephrologist stating they are aware of patient; and need for dialysis, Nephrologist states it will be okay for patient to have PICC only in Non-dialysis fistula arm    Supervisor made aware of Nephrologist clearance for PICC    1800 Patient resting in bed. She denied pain or discomfort at this time. No nausea reported. Denied other needs at this time. HOB elevated. Bed to lowest level. Call bell within reach.

## 2023-03-01 NOTE — PROGRESS NOTES
2432- patient arrived on floor. Karey- ED physician in room. Attempted twice to get  EJ. Neither attempt successful. Then tried to attempt a central line. Two unsuccessful attempts. Chest X-Ray performed. 1110- Anesthesia placed PIV  in left foot.

## 2023-03-01 NOTE — PROGRESS NOTES
Patient is not available to be assessed at this time.       [address].   [phone]

## 2023-03-01 NOTE — ED NOTES
Patient reports pain to abdomen and nausea. Dr. Barry Dhaliwal made aware and orders given for Zofran and Pepcid.      [facility]  03/01/23 0207

## 2023-03-01 NOTE — H&P
History and Physical    Subjective:     Lc Patterson is a 74 y.o. female  with ESRD HD, failed renal transplant, HTN, HLP, Hypothyroididsm, and CMV+, who represented herself to the ED today with c/o nausea, vomit, abdominal cramps x 3 days, No missed HD, states HD series is q MF only at [facility]. Was here in ED 3 days ago requesting admit for same complaints but was discharged home, she has not been eating, she appears withdrawn, Hx is limited because she appears withdrawn, not wanting to communicate, or partake in her care, she states she has been Just nauseous, no chest pain, sob, fever or chills, no other complaints voiced. Last BM was this morning and loose. Hospitalist was asked to admit.         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)     M/W/F    Diverticular disease of colon 08/21/2013    Dyspepsia and other specified disorders of function of stomach     stomach ulcer    Elevated troponin 10/21/2019    Encounter for cholecystectomy 05/06/2021 7/2020    Essential hypertension     GERD (gastroesophageal reflux disease)     History of chemotherapy     History of renal transplant 08/21/2013    (LD 2/12/2002)    HLD (hyperlipidemia)     Hypercholesteremia     Hypertension     Hypothyroidism 03/23/2022    Kidney transplant rejection     Menopause     Migraine     Obesity     Osteoporosis     Pancreatitis 08/28/2022    Pyelonephritis 07/13/2020    Recurrent urinary tract infection 09/27/2016    Renal cyst 2002    kidney transplant     Spontaneous bacterial peritonitis ([facility] [address] 01/16/2019    Ulcer       Past Surgical History:   Procedure Laterality Date    CHOLECYSTECTOMY  02/2021    COLONOSCOPY  05/13/2022    COLONOSCOPY      Dr Tasia Calle  5yrs ago    COLONOSCOPY      with Dr. Veronique Calle  5 yrs ago    GI      ulcer    HEENT      sinus surg    KIDNEY TRANSPLANT      OTHER SURGICAL HISTORY  02/21/2022    SIGMOIDOSCOPY, FLEXIBLE;  Surgeon: Jaye Wu MD    TRANSPLANT      kidney transplant 2002    VASCULAR SURGERY      shunt in prep for dialysis     Family History   Problem Relation Age of Onset    Diabetes Mother     Rheum Arthritis Mother     Hypertension Father     Diabetes Father     Thyroid Disease Sister     Colon Polyps Brother       Social History     Tobacco Use    Smoking status: Never    Smokeless tobacco: Never   Substance Use Topics    Alcohol use: No       Prior to Admission medications    Medication Sig Start Date End Date Taking?  Authorizing Provider   ondansetron (ZOFRAN) 4 MG tablet Take 1 tablet by mouth 3 times daily as needed for Nausea or Vomiting 2/26/23   Marcos Chiu MD   fluticasone-umeclidin-vilant (TRELEGY ELLIPTA) 656-20.5-30 MCG/ACT AEPB inhaler Inhale 1 puff into the lungs daily 2/24/23   Jeremie East MD   docusate sodium (COLACE, DULCOLAX) 100 MG CAPS Take 100 mg by mouth 2 times daily 2/14/23 3/16/23  Bette Momin MD   albuterol sulfate HFA (PROVENTIL;VENTOLIN;PROAIR) 108 (90 Base) MCG/ACT inhaler Inhale 1 puff into the lungs every 4 hours as needed 11/3/22   Ar Automatic Reconciliation   albuterol (PROVENTIL) (2.5 MG/3ML) 0.083% nebulizer solution USE 1 VIAL VIA NEBULIZER THREE TIMES DAILY 12/23/21   Ar Automatic Reconciliation   aluminum & magnesium hydroxide-simethicone (MYLANTA) 400-400-40 MG/5ML SUSP Take 10 mLs by mouth every 6 hours as needed 9/17/20   Ar Automatic Reconciliation   amiodarone (CORDARONE) 200 MG tablet TAKE 1 TABLET BY MOUTH EVERY DAY 8/16/22   Ar Automatic Reconciliation   amLODIPine (NORVASC) 10 MG tablet Take 1 tablet by mouth once daily 8/9/22   Ar Automatic Reconciliation   apixaban (ELIQUIS) 2.5 MG TABS tablet Take 2.5 mg by mouth 2 times daily 7/27/22   Ar Automatic Reconciliation   calcitRIOL (ROCALTROL) 0.5 MCG capsule Take 0.5 mcg by mouth See Admin Instructions 7/19/21   Ar Automatic Reconciliation   carvedilol (COREG) 25 MG tablet Take 25 mg by mouth 2 times daily (with meals) 7/27/22   Ar Automatic Reconciliation   cinacalcet (SENSIPAR) 30 MG tablet Take 30 mg by mouth daily    Ar Automatic Reconciliation   dexlansoprazole (DEXILANT) 60 MG CPDR delayed release capsule TAKE 1 CAPSULE BY MOUTH IN THE MORNING. 10/3/22   Ar Automatic Reconciliation   dicyclomine (BENTYL) 10 MG capsule Take 10 mg by mouth daily 7/27/22   Ar Automatic Reconciliation   doxercalciferol (HECTOROL) 4 MCG/2ML injection Infuse 6 mcg intravenously 3/25/22   Ar Automatic Reconciliation   EPINEPHrine (EPIPEN) 0.3 MG/0.3ML SOAJ injection INJECT 0.3 ML INTRAMUSCULARLY ONCE AS NEEDED FOR ALLERGIC RESPONSE 10/5/22   Ar Automatic Reconciliation   epoetin amando (EPOGEN;PROCRIT) 75725 UNIT/ML injection Inject 10,000 Units into the skin 3/28/22   Ar Automatic Reconciliation   estradiol (ESTRACE VAGINAL) 0.1 MG/GM vaginal cream INSERT 2 GRAMS INTO VAGINA EVERY MONDAY AND THURSDAY 4/11/17   Ar Automatic Reconciliation   famotidine (PEPCID) 20 MG tablet Take 20 mg by mouth daily 10/13/22   Ar Automatic Reconciliation   fluticasone (FLONASE) 50 MCG/ACT nasal spray USE 1 SPRAY IN EACH NOSTRIL EVERY MORNING 10/5/22   Ar Automatic Reconciliation   Hyoscyamine Sulfate SL 0.125 MG SUBL Place 0.125 mg under the tongue every 4 hours as needed 7/27/22   Ar Automatic Reconciliation   levothyroxine (SYNTHROID) 75 MCG tablet Take 75 mcg by mouth every morning (before breakfast) 7/27/22   Ar Automatic Reconciliation   losartan (COZAAR) 50 MG tablet Take 50 mg by mouth daily 8/26/22   Ar Automatic Reconciliation   meclizine (ANTIVERT) 25 MG tablet TAKE 1 TABLET THREE TIMES DAILY AS NEEDED FOR DIZZINESS 12/21/22   Ar Automatic Reconciliation   montelukast (SINGULAIR) 10 MG tablet Take 10 mg by mouth daily 7/27/22   Ar Automatic Reconciliation   ondansetron (ZOFRAN-ODT) 4 MG disintegrating tablet Take 8 mg by mouth every 8 hours as needed 11/28/22   Ar Automatic Reconciliation   polyethylene glycol (GLYCOLAX) 17 GM/SCOOP powder Take 17 g by mouth 2 times daily 9/14/22   Ar Automatic Reconciliation   predniSONE (DELTASONE) 5 MG tablet Take 1 tablet by mouth daily    Ar Automatic Reconciliation   sevelamer (RENVELA) 800 MG tablet Take 800 mg by mouth 3 times daily 6/8/21   Ar Automatic Reconciliation   simvastatin (ZOCOR) 20 MG tablet TAKE 1 TABLET BY MOUTH DAILY AS DIRECTED. 7/27/22   Ar Automatic Reconciliation   sucralfate (CARAFATE) 1 GM tablet TAKE 1 TABLET BY MOUTH FOUR TIMES DAILY 7/27/22   Ar Automatic Reconciliation   traZODone (DESYREL) 50 MG tablet Take 50 mg by mouth 11/3/22   Ar Automatic Reconciliation   valGANciclovir (VALCYTE) 450 MG tablet Take 900 mg by mouth daily 7/27/22   Ar Automatic Reconciliation     Allergies   Allergen Reactions    Aspirin Rash     Other reaction(s): Unknown (comments)    Bromfenac Rash     Other reaction(s): Unknown (comments)    Cefepime Other (See Comments)     Neurological reaction    Ceftriaxone Rash    Copper Rash    Ibuprofen Rash     Other reaction(s): Unknown (comments)    Ketorolac Tromethamine Rash     Other reaction(s): Unknown (comments)    Nabumetone Rash     Other reaction(s): Unknown (comments)    Rifampin Rash     Other reaction(s): Unknown (comments)    Sulfamethoxazole-Trimethoprim Rash     Other reaction(s): Unknown (comments)    Vancomycin Rash     Other reaction(s): Unknown (comments)  Pt reports causes itching, takes benadryl prior to use. Pt denies anaphylaxis.     Requires benadryl with each vancomycin dose Review of Systems:  Negative except as mentioned in HPI. Objective:     Vitals:  BP (!) 188/81   Pulse 94   Temp 98.4 °F (36.9 °C) (Temporal)   Resp 17   Ht 5' 1\" (1.549 m)   Wt 156 lb 6.4 oz (70.9 kg)   SpO2 97%   BMI 29.55 kg/m²     Physical Exam:  General: Alert, awake, orients x 4, very uncooperative with history collection, appears withdrawn, uncooperative, no acute distress. Head: Normocephalic, without obvious abnormality, atraumatic. Eyes: Conjunctivae/corneas clear. Pupils equal, round, reactive to light. Extraocular movements intact. No icterus. Lungs: Clear to auscultation bilaterally, no wheezes, no crackles  Chest wall: No tenderness or deformity. Heart: normal rate, irregular rhythm, S1, S2, no murmur, click, rub, or gallop. Abdomen: Soft, non-tender. Bowel sounds active, no palpable masses,   Back:  No spine tenderness to palpation  Extremities: Extremities normal, atraumatic, no cyanosis or edema. Pulses: 2+ and Symmetric all extremities. Skin: Not pale or jaundiced, overall skin color, texture, turgor normal.   Lymph nodes: Cervical nodes normal.  Neurologic: Awake and oriented, x4, Withdrawn.        Labs:  Recent Results (from the past 24 hour(s))   EKG 12 Lead    Collection Time: 03/01/23  4:20 AM   Result Value Ref Range    Ventricular Rate 89 BPM    Atrial Rate 89 BPM    P-R Interval 221 ms    QRS Duration 104 ms    Q-T Interval 406 ms    QTc Calculation (Bazett) 495 ms    P Axis 66 degrees    R Axis 64 degrees    T Axis 74 degrees    Diagnosis Sinus rhythm    POCT Glucose    Collection Time: 03/01/23  4:30 AM   Result Value Ref Range    POC Glucose 67 (L) 70 - 110 mg/dL    Performed by: Katherine Prescott    Basic Metabolic Panel    Collection Time: 03/01/23  4:45 AM   Result Value Ref Range    Sodium 131 (L) 136 - 145 mmol/L    Potassium 5.3 3.5 - 5.5 mmol/L    Chloride 95 (L) 100 - 111 mmol/L    CO2 20 (L) 21 - 32 mmol/L    Anion Gap 16 3.0 - 18.0 mmol/L    Glucose 120 (H) 74 - 99 mg/dL    BUN 46 (H) 7 - 18 mg/dL    Creatinine 10.80 (H) 0.60 - 1.30 mg/dL    Bun/Cre Ratio 4 (L) 12 - 20      Est, Glom Filt Rate 4 (L) >60 ml/min/1.73m2    Calcium 8.8 8.5 - 10.1 mg/dL   CBC with Auto Differential    Collection Time: 03/01/23  4:45 AM   Result Value Ref Range    WBC 4.6 4.6 - 13.2 K/uL    RBC 4.09 (L) 4.20 - 5.30 M/uL    Hemoglobin 12.2 12.0 - 16.0 g/dL    Hematocrit 38.4 35.0 - 45.0 %    MCV 93.9 78.0 - 100.0 FL    MCH 29.8 24.0 - 34.0 PG    MCHC 31.8 31.0 - 37.0 g/dL    RDW 18.5 (H) 11.6 - 14.5 %    Platelets 963 019 - 688 K/uL    MPV 8.8 (L) 9.2 - 11.8 FL    Nucleated RBCs 0.0 0.0  WBC    nRBC 0.00 0.00 - 0.01 K/uL    Seg Neutrophils 76 (H) 40 - 73 %    Lymphocytes 14 (L) 21 - 52 %    Monocytes 6 3 - 10 %    Eosinophils % 1 0 - 5 %    Basophils 2 0 - 2 %    Immature Granulocytes 1 (H) 0 - 0.5 %    Segs Absolute 3.6 1.8 - 8.0 K/UL    Absolute Lymph # 0.6 (L) 0.9 - 3.6 K/UL    Absolute Mono # 0.3 0.05 - 1.2 K/UL    Absolute Eos # 0.0 0.0 - 0.4 K/UL    Basophils Absolute 0.1 0.0 - 0.1 K/UL    Absolute Immature Granulocyte 0.0 0.00 - 0.04 K/UL    Differential Type AUTOMATED     Lipase    Collection Time: 03/01/23  4:45 AM   Result Value Ref Range    Lipase 86 73 - 393 U/L   Troponin    Collection Time: 03/01/23  4:45 AM   Result Value Ref Range    Troponin, High Sensitivity 21 0 - 54 ng/L   Hepatic Function Panel    Collection Time: 03/01/23  4:45 AM   Result Value Ref Range    Total Protein 7.3 6.4 - 8.2 g/dL    Albumin 3.0 (L) 3.4 - 5.0 g/dL    Globulin 4.3 (H) 2.0 - 4.0 g/dL    Albumin/Globulin Ratio 0.7 (L) 0.8 - 1.7      Total Bilirubin 0.6 0.2 - 1.0 mg/dL    Bilirubin, Direct 0.2 0.0 - 0.2 mg/dL    Alk Phosphatase 221 (H) 45 - 117 U/L    AST 14 10 - 38 U/L    ALT 14 13 - 56 U/L   Lactic Acid    Collection Time: 03/01/23  4:45 AM   Result Value Ref Range    Lactic Acid, Plasma 0.9 0.4 - 2.0 mmol/L   POCT Glucose    Collection Time: 03/01/23  5:28 AM   Result Value Ref Range POC Glucose 107 70 - 110 mg/dL    Performed by: Sandip Stephenson    POCT Glucose    Collection Time: 03/01/23  6:34 AM   Result Value Ref Range    POC Glucose 97 70 - 110 mg/dL    Performed by: Sandip Stephenson    POCT Glucose    Collection Time: 03/01/23 11:44 AM   Result Value Ref Range    POC Glucose 70 70 - 110 mg/dL    Performed by: Caleb Ruiz    POCT Glucose    Collection Time: 03/01/23  1:24 PM   Result Value Ref Range    POC Glucose 74 70 - 110 mg/dL    Performed by: Aris Vee        Imaging:  [unfilled]     Assessment & Plan:     #1: Intractable nausea, vomit, abdominal cramps:  -all chronic issues, was in Ed 3 days prior for same, with negative CT scan of abdomen--2/26/23 at the time, no emesis so far,   -Hx ESRD on  HD, no missed HD, states had last HD 2 days ago-on Monday  -has dicyclomine, zofran prn at home, continue. #2: ESRD  routine:  -HD consultation for HD, BUN 46, Cr 10.8, C02 20.   -from Dr [facility]. #3: Hypertension:  -uncontrolled, cont home meds-amlodipine, losartan, carvedilol,     #4: Atrial Fibrillation:  -chronic issue, cont amiodarone, eliquis. Sinus rhythm so far. #5: Refusing Care, ? ? Adult FTT   ? Depression:  -not wanting to eat, or drink, or get oob, ? ??clinical depression, not cooperative with interview, feels withdrawn. Non-suicidal.   -recommend pcp referral for psyche eval at d/c. -Encourage oral intake. #6: Hypoglycemia:  -mild, not eating, now resolved, with dextrose, poor IV access as well, has a left leg IV.  -encourage oral intake, no hx of DM. VTE Prophylaxis: Eliquis  Code status: Full code  Total time spent: 35 minutes. Plan of care discussed with patient and nursing staff.

## 2023-03-01 NOTE — PROGRESS NOTES
[facility] notified of patients admission, office will page Dr Jaime Hardwick for HD orders

## 2023-03-01 NOTE — PROGRESS NOTES
Jonas Ely notified of HD orders to be placed. No ETA for dialysis. Per acute  pt will probably be dialyzed this afternoon.

## 2023-03-01 NOTE — ED PROVIDER NOTES
[facility] [address]      Pt Name: Sahara Waters  MRN: [id number]  [place] 1955  Date of evaluation: 3/1/2023  Provider: Patti Rodriguez MD    CHIEF COMPLAINT       Chief Complaint   Patient presents with    Emesis     Diarrhea       HPI:  Sahara Waters is a 74 y.o. female who presents to the emergency department pt c/o n/v/d x few days, here for same 3 days ago. On hd, m/f, says missed 2 days ago as planned bc was having too much diarrhea to get dressed. C/o abd pain. No cp or sob. Feels weak and fatigued. Biba, was found to be hypoglycemic for medics, 45 gave oral gluc, improved to 52. No h/o dm per pt. Used zofran at home, not helping. HPI  Per pt and medics  Nursing Notes were reviewed. REVIEW OF SYSTEMS    (2-9 systems for level 4, 10 or more for level 5)     Review of Systems   Constitutional:  Positive for fatigue. Negative for fever. Cardiovascular:  Negative for chest pain. Gastrointestinal:  Positive for abdominal pain, diarrhea, nausea and vomiting. Negative for blood in stool. Musculoskeletal:  Negative for back pain. Skin:  Negative for rash. Neurological:  Positive for light-headedness. Negative for headaches. All other systems reviewed and are negative. Except as noted above the remainder of the review of systems was reviewed and negative.        PAST MEDICAL HISTORY     Past Medical History:   Diagnosis Date    JULIET (acute kidney injury) ([facility] [address] 05/09/2019    Anxiety     Arrhythmia     Arthritis     Asthma     Asthma     Burning with urination     frequent uti    Calculus of gallbladder with acute cholecystitis without obstruction 10/09/2020    Cholecystitis 01/12/2019    Chronic kidney disease     dialysis    CMV (cytomegalovirus) antibody positive     Colitis 09/10/2022    COPD (chronic obstructive pulmonary disease) ([facility] [address]     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    Dialysis patient [facility])     M/W/F    Diverticular disease of colon 08/21/2013 Dyspepsia and other specified disorders of function of stomach     stomach ulcer    Elevated troponin 10/21/2019    Encounter for cholecystectomy 05/06/2021 7/2020    Essential hypertension     GERD (gastroesophageal reflux disease)     History of chemotherapy     History of renal transplant 08/21/2013    (LD 2/12/2002)    HLD (hyperlipidemia)     Hypercholesteremia     Hypertension     Hypothyroidism 03/23/2022    Kidney transplant rejection     Menopause     Migraine     Obesity     Osteoporosis     Pancreatitis 08/28/2022    Pyelonephritis 07/13/2020    Recurrent urinary tract infection 09/27/2016    Renal cyst 2002    kidney transplant     Spontaneous bacterial peritonitis ([address] 01/16/2019    Ulcer          SURGICAL HISTORY       Past Surgical History:   Procedure Laterality Date    CHOLECYSTECTOMY  02/2021    COLONOSCOPY  05/13/2022    COLONOSCOPY      Dr Vazquez Course  5yrs ago    COLONOSCOPY      with Dr. Heather Vazquez Course  5 yrs ago    GI      ulcer    HEENT      sinus surg    KIDNEY TRANSPLANT      OTHER SURGICAL HISTORY  02/21/2022    SIGMOIDOSCOPY, FLEXIBLE;  Surgeon: Marsha Hernandez MD    TRANSPLANT      kidney transplant 2002    VASCULAR SURGERY      shunt in prep for dialysis         CURRENT MEDICATIONS       Current Discharge Medication List        CONTINUE these medications which have NOT CHANGED    Details   ondansetron (ZOFRAN) 4 MG tablet Take 1 tablet by mouth 3 times daily as needed for Nausea or Vomiting  Qty: 15 tablet, Refills: 0      fluticasone-umeclidin-vilant (TRELEGY ELLIPTA) 100-62.5-25 MCG/ACT AEPB inhaler Inhale 1 puff into the lungs daily  Qty: 60 each, Refills: 0      docusate sodium (COLACE, DULCOLAX) 100 MG CAPS Take 100 mg by mouth 2 times daily  Qty: 60 capsule, Refills: 0      albuterol sulfate HFA (PROVENTIL;VENTOLIN;PROAIR) 108 (90 Base) MCG/ACT inhaler Inhale 1 puff into the lungs every 4 hours as needed      albuterol (PROVENTIL) (2.5 MG/3ML) 0.083% nebulizer solution USE 1 VIAL VIA NEBULIZER THREE TIMES DAILY      aluminum & magnesium hydroxide-simethicone (MYLANTA) 400-400-40 MG/5ML SUSP Take 10 mLs by mouth every 6 hours as needed      amiodarone (CORDARONE) 200 MG tablet TAKE 1 TABLET BY MOUTH EVERY DAY      amLODIPine (NORVASC) 10 MG tablet Take 1 tablet by mouth once daily      apixaban (ELIQUIS) 2.5 MG TABS tablet Take 2.5 mg by mouth 2 times daily      calcitRIOL (ROCALTROL) 0.5 MCG capsule Take 0.5 mcg by mouth See Admin Instructions      carvedilol (COREG) 25 MG tablet Take 25 mg by mouth 2 times daily (with meals)      cinacalcet (SENSIPAR) 30 MG tablet Take 30 mg by mouth daily      dexlansoprazole (DEXILANT) 60 MG CPDR delayed release capsule TAKE 1 CAPSULE BY MOUTH IN THE MORNING.       dicyclomine (BENTYL) 10 MG capsule Take 10 mg by mouth daily      doxercalciferol (HECTOROL) 4 MCG/2ML injection Infuse 6 mcg intravenously      EPINEPHrine (EPIPEN) 0.3 MG/0.3ML SOAJ injection INJECT 0.3 ML INTRAMUSCULARLY ONCE AS NEEDED FOR ALLERGIC RESPONSE      epoetin amando (EPOGEN;PROCRIT) 38979 UNIT/ML injection Inject 10,000 Units into the skin      estradiol (ESTRACE VAGINAL) 0.1 MG/GM vaginal cream INSERT 2 GRAMS INTO VAGINA EVERY MONDAY AND THURSDAY      famotidine (PEPCID) 20 MG tablet Take 20 mg by mouth daily      fluticasone (FLONASE) 50 MCG/ACT nasal spray USE 1 SPRAY IN EACH NOSTRIL EVERY MORNING      Hyoscyamine Sulfate SL 0.125 MG SUBL Place 0.125 mg under the tongue every 4 hours as needed      levothyroxine (SYNTHROID) 75 MCG tablet Take 75 mcg by mouth every morning (before breakfast)      losartan (COZAAR) 50 MG tablet Take 50 mg by mouth daily      meclizine (ANTIVERT) 25 MG tablet TAKE 1 TABLET THREE TIMES DAILY AS NEEDED FOR DIZZINESS      montelukast (SINGULAIR) 10 MG tablet Take 10 mg by mouth daily      ondansetron (ZOFRAN-ODT) 4 MG disintegrating tablet Take 8 mg by mouth every 8 hours as needed      polyethylene glycol (GLYCOLAX) 17 GM/SCOOP powder Take 17 g by mouth 2 times daily      predniSONE (DELTASONE) 5 MG tablet Take 1 tablet by mouth daily      sevelamer (RENVELA) 800 MG tablet Take 800 mg by mouth 3 times daily      simvastatin (ZOCOR) 20 MG tablet TAKE 1 TABLET BY MOUTH DAILY AS DIRECTED.      sucralfate (CARAFATE) 1 GM tablet TAKE 1 TABLET BY MOUTH FOUR TIMES DAILY      traZODone (DESYREL) 50 MG tablet Take 50 mg by mouth      valGANciclovir (VALCYTE) 450 MG tablet Take 900 mg by mouth daily             ALLERGIES     Aspirin, Bromfenac, Cefepime, Ceftriaxone, Copper, Ibuprofen, Ketorolac tromethamine, Nabumetone, Rifampin, Sulfamethoxazole-trimethoprim, and Vancomycin    FAMILY HISTORY       Family History   Problem Relation Age of Onset    Diabetes Mother     Rheum Arthritis Mother     Hypertension Father     Diabetes Father     Thyroid Disease Sister     Colon Polyps Brother           SOCIAL HISTORY       Social History     Socioeconomic History    Marital status:      Spouse name: None    Number of children: None    Years of education: None    Highest education level: None   Tobacco Use    Smoking status: Never    Smokeless tobacco: Never   Substance and Sexual Activity    Alcohol use: No    Drug use: No     Social Determinants of Health     Financial Resource Strain: High Risk    Difficulty of Paying Living Expenses: Very hard   Food Insecurity: Food Insecurity Present    Worried About Running Out of Food in the Last Year: Sometimes true    Ran Out of Food in the Last Year: Sometimes true   Transportation Needs: Unmet Transportation Needs    Lack of Transportation (Medical): Yes    Lack of Transportation (Non-Medical): Yes   Housing Stability: Unknown    Unstable Housing in the Last Year: No       SCREENINGS         Manolo Coma Scale  Eye Opening: Spontaneous  Best Verbal Response: Oriented  Best Motor Response: Obeys commands  Manolo Coma Scale Score: 15                     WA Assessment  BP: 110/78  Heart Rate: (!) 168                 PHYSICAL EXAM    (up to 7 for level 4, 8 or more for level 5)     ED Triage Vitals [03/01/23 0410]   BP Temp Temp Source Heart Rate Resp SpO2 Height Weight   (!) 176/75 97.8 °F (36.6 °C) Oral 90 18 99 % 5' 1\" (1.549 m) 156 lb 6.4 oz (70.9 kg)       Physical Exam  Vitals and nursing note reviewed. Constitutional:       Appearance: Normal appearance. HENT:      Head: Normocephalic and atraumatic. Eyes:      Conjunctiva/sclera: Conjunctivae normal.   Cardiovascular:      Rate and Rhythm: Normal rate. Pulses: Normal pulses. Pulmonary:      Effort: Pulmonary effort is normal.   Abdominal:      General: Abdomen is flat. Palpations: Abdomen is soft. Tenderness: There is abdominal tenderness. There is no guarding. Hernia: No hernia is present. Musculoskeletal:         General: Normal range of motion. Cervical back: Normal range of motion. Skin:     Coloration: Skin is pale. Neurological:      General: No focal deficit present. Mental Status: She is alert. Psychiatric:         Mood and Affect: Mood normal.       DIAGNOSTIC RESULTS     EKG: All EKG's are interpreted by the Emergency Department Physician who either signs or Co-signs this chart in the absence of a cardiologist.      RADIOLOGY:   Non-plain film images such as CT, Ultrasound and MRI are read by the radiologist. Plain radiographic images are visualized and preliminarily interpreted by the emergency physician with the below findings:      Interpretation per the Radiologist below, if available at the time of this note:    XR CHEST PORTABLE   Final Result      No retained foreign body or pneumothorax   Mild interstitial edema   The mediastinum is not optimally evaluated on portable radiography      XR CHEST PORTABLE   Final Result   No acute process.             LABS:  Labs Reviewed   BASIC METABOLIC PANEL - Abnormal; Notable for the following components:       Result Value    Sodium 131 (*) Chloride 95 (*)     CO2 20 (*)     Glucose 120 (*)     BUN 46 (*)     Creatinine 10.80 (*)     Bun/Cre Ratio 4 (*)     Est, Glom Filt Rate 4 (*)     All other components within normal limits   CBC WITH AUTO DIFFERENTIAL - Abnormal; Notable for the following components:    RBC 4.09 (*)     RDW 18.5 (*)     MPV 8.8 (*)     Seg Neutrophils 76 (*)     Lymphocytes 14 (*)     Immature Granulocytes 1 (*)     Absolute Lymph # 0.6 (*)     All other components within normal limits   HEPATIC FUNCTION PANEL - Abnormal; Notable for the following components:    Albumin 3.0 (*)     Globulin 4.3 (*)     Albumin/Globulin Ratio 0.7 (*)     Alk Phosphatase 221 (*)     All other components within normal limits   POCT GLUCOSE - Abnormal; Notable for the following components:    POC Glucose 67 (*)     All other components within normal limits   POCT GLUCOSE - Abnormal; Notable for the following components:    POC Glucose 368 (*)     All other components within normal limits   POCT GLUCOSE - Abnormal; Notable for the following components:    POC Glucose 115 (*)     All other components within normal limits   POCT GLUCOSE - Abnormal; Notable for the following components:    POC Glucose 128 (*)     All other components within normal limits   LIPASE   TROPONIN   LACTIC ACID   POCT GLUCOSE   POCT GLUCOSE   POCT GLUCOSE   POCT GLUCOSE   POCT GLUCOSE   POCT GLUCOSE   POCT GLUCOSE   POCT GLUCOSE   POCT GLUCOSE   POCT GLUCOSE   POCT GLUCOSE   POCT GLUCOSE       All other labs were within normal range or not returned as of this dictation.     EMERGENCY DEPARTMENT COURSE and DIFFERENTIAL DIAGNOSIS/MDM:   Vitals:    Vitals:    03/01/23 2245 03/01/23 2300 03/01/23 2310 03/02/23 0010   BP: (!) 96/54 115/68 123/81 110/78   Pulse: 91 (!) 134 (!) 133 (!) 168   Resp: 16 16 16 18   Temp:   97.8 °F (36.6 °C) 97.9 °F (36.6 °C)   TempSrc:    Temporal   SpO2:       Weight:       Height:             Discussion:       Gluc 107 on recheck after d10  6:14 AM d/w NP Kaushal, to admit         FINAL IMPRESSION      1. Hypoglycemia    2. Nausea and vomiting, unspecified vomiting type          DISPOSITION/PLAN   DISPOSITION Admitted 03/01/2023 06:57:51 AM      PATIENT REFERRED TO:  No follow-up provider specified. DISCHARGE MEDICATIONS:  Current Discharge Medication List        Controlled Substances Monitoring:     No flowsheet data found.     (Please note that portions of this note were completed with a voice recognition program.  Efforts were made to edit the dictations but occasionally words are mis-transcribed.)    Lalo Bejarano MD (electronically signed)  Attending Emergency Physician          Betsy Mcgarry MD  03/02/23 2044

## 2023-03-02 ENCOUNTER — ANESTHESIA (OUTPATIENT)
Age: 68
End: 2023-03-02
Payer: MEDICARE

## 2023-03-02 ENCOUNTER — APPOINTMENT (OUTPATIENT)
Age: 68
DRG: 682 | End: 2023-03-02
Payer: MEDICARE

## 2023-03-02 ENCOUNTER — ANESTHESIA EVENT (OUTPATIENT)
Age: 68
End: 2023-03-02
Payer: MEDICARE

## 2023-03-02 PROBLEM — N18.6 CHRONIC KIDNEY DISEASE WITH END STAGE RENAL FAILURE ON DIALYSIS (HCC): Status: ACTIVE | Noted: 2023-03-02

## 2023-03-02 PROBLEM — Z99.2 CHRONIC KIDNEY DISEASE WITH END STAGE RENAL FAILURE ON DIALYSIS (HCC): Status: ACTIVE | Noted: 2023-03-02

## 2023-03-02 LAB
ANION GAP SERPL CALC-SCNC: 11 MMOL/L (ref 3–18)
BASOPHILS # BLD: 0.1 K/UL (ref 0–0.1)
BASOPHILS NFR BLD: 1 % (ref 0–2)
BUN SERPL-MCNC: 21 MG/DL (ref 7–18)
BUN/CREAT SERPL: 3 (ref 12–20)
CA-I BLD-MCNC: 8.7 MG/DL (ref 8.5–10.1)
CHLORIDE SERPL-SCNC: 99 MMOL/L (ref 100–111)
CO2 SERPL-SCNC: 27 MMOL/L (ref 21–32)
CREAT SERPL-MCNC: 6.89 MG/DL (ref 0.6–1.3)
DIFFERENTIAL METHOD BLD: ABNORMAL
EKG ATRIAL RATE: 268 BPM
EKG DIAGNOSIS: NORMAL
EKG P-R INTERVAL: 108 MS
EKG Q-T INTERVAL: 322 MS
EKG QRS DURATION: 109 MS
EKG QTC CALCULATION (BAZETT): 500 MS
EKG R AXIS: 44 DEGREES
EKG T AXIS: 130 DEGREES
EKG VENTRICULAR RATE: 145 BPM
EOSINOPHIL # BLD: 0 K/UL (ref 0–0.4)
EOSINOPHIL NFR BLD: 0 % (ref 0–5)
ERYTHROCYTE [DISTWIDTH] IN BLOOD BY AUTOMATED COUNT: 18.6 % (ref 11.6–14.5)
GLUCOSE BLD STRIP.AUTO-MCNC: 109 MG/DL (ref 70–110)
GLUCOSE BLD STRIP.AUTO-MCNC: 110 MG/DL (ref 70–110)
GLUCOSE BLD STRIP.AUTO-MCNC: 115 MG/DL (ref 70–110)
GLUCOSE BLD STRIP.AUTO-MCNC: 124 MG/DL (ref 70–110)
GLUCOSE BLD STRIP.AUTO-MCNC: 128 MG/DL (ref 70–110)
GLUCOSE BLD STRIP.AUTO-MCNC: 129 MG/DL (ref 70–110)
GLUCOSE SERPL-MCNC: 115 MG/DL (ref 74–99)
HCT VFR BLD AUTO: 31.5 % (ref 35–45)
HGB BLD-MCNC: 9.6 G/DL (ref 12–16)
IMM GRANULOCYTES # BLD AUTO: 0.1 K/UL (ref 0–0.04)
IMM GRANULOCYTES NFR BLD AUTO: 1 % (ref 0–0.5)
LYMPHOCYTES # BLD: 1.7 K/UL (ref 0.9–3.6)
LYMPHOCYTES NFR BLD: 25 % (ref 21–52)
MAGNESIUM SERPL-MCNC: 2.1 MG/DL (ref 1.6–2.6)
MCH RBC QN AUTO: 29.4 PG (ref 24–34)
MCHC RBC AUTO-ENTMCNC: 30.5 G/DL (ref 31–37)
MCV RBC AUTO: 96.3 FL (ref 78–100)
MONOCYTES # BLD: 0.7 K/UL (ref 0.05–1.2)
MONOCYTES NFR BLD: 11 % (ref 3–10)
NEUTS SEG # BLD: 4.3 K/UL (ref 1.8–8)
NEUTS SEG NFR BLD: 62 % (ref 40–73)
NRBC # BLD: 0 K/UL (ref 0–0.01)
NRBC BLD-RTO: 0 PER 100 WBC
PERFORMED BY:: ABNORMAL
PERFORMED BY:: NORMAL
PERFORMED BY:: NORMAL
PLATELET # BLD AUTO: 243 K/UL (ref 135–420)
PMV BLD AUTO: 9 FL (ref 9.2–11.8)
POTASSIUM SERPL-SCNC: 4.9 MMOL/L (ref 3.5–5.5)
PROCALCITONIN SERPL-MCNC: 29.99 NG/ML
RBC # BLD AUTO: 3.27 M/UL (ref 4.2–5.3)
SODIUM SERPL-SCNC: 137 MMOL/L (ref 136–145)
TSH SERPL DL<=0.05 MIU/L-ACNC: 13.9 UIU/ML (ref 0.36–3.74)
WBC # BLD AUTO: 6.8 K/UL (ref 4.6–13.2)

## 2023-03-02 PROCEDURE — 6370000000 HC RX 637 (ALT 250 FOR IP): Performed by: NURSE PRACTITIONER

## 2023-03-02 PROCEDURE — 6360000002 HC RX W HCPCS: Performed by: INTERNAL MEDICINE

## 2023-03-02 PROCEDURE — 2580000003 HC RX 258: Performed by: NURSE PRACTITIONER

## 2023-03-02 PROCEDURE — 6370000000 HC RX 637 (ALT 250 FOR IP): Performed by: INTERNAL MEDICINE

## 2023-03-02 PROCEDURE — 84436 ASSAY OF TOTAL THYROXINE: CPT

## 2023-03-02 PROCEDURE — 82533 TOTAL CORTISOL: CPT

## 2023-03-02 PROCEDURE — 6360000002 HC RX W HCPCS: Performed by: NURSE PRACTITIONER

## 2023-03-02 PROCEDURE — 84145 PROCALCITONIN (PCT): CPT

## 2023-03-02 PROCEDURE — 83735 ASSAY OF MAGNESIUM: CPT

## 2023-03-02 PROCEDURE — 70450 CT HEAD/BRAIN W/O DYE: CPT

## 2023-03-02 PROCEDURE — 82962 GLUCOSE BLOOD TEST: CPT

## 2023-03-02 PROCEDURE — 1100000000 HC RM PRIVATE

## 2023-03-02 PROCEDURE — 2500000003 HC RX 250 WO HCPCS: Performed by: NURSE PRACTITIONER

## 2023-03-02 PROCEDURE — 2000000000 HC ICU R&B

## 2023-03-02 PROCEDURE — 93005 ELECTROCARDIOGRAM TRACING: CPT | Performed by: NURSE PRACTITIONER

## 2023-03-02 PROCEDURE — 80048 BASIC METABOLIC PNL TOTAL CA: CPT

## 2023-03-02 PROCEDURE — 94640 AIRWAY INHALATION TREATMENT: CPT

## 2023-03-02 PROCEDURE — 84443 ASSAY THYROID STIM HORMONE: CPT

## 2023-03-02 PROCEDURE — 85025 COMPLETE CBC W/AUTO DIFF WBC: CPT

## 2023-03-02 PROCEDURE — 94761 N-INVAS EAR/PLS OXIMETRY MLT: CPT

## 2023-03-02 RX ORDER — METOPROLOL TARTRATE 5 MG/5ML
2.5 INJECTION INTRAVENOUS ONCE
Status: COMPLETED | OUTPATIENT
Start: 2023-03-02 | End: 2023-03-02

## 2023-03-02 RX ORDER — DILTIAZEM HYDROCHLORIDE 5 MG/ML
10 INJECTION INTRAVENOUS ONCE
Status: COMPLETED | OUTPATIENT
Start: 2023-03-02 | End: 2023-03-02

## 2023-03-02 RX ORDER — ACETAMINOPHEN 650 MG/1
650 SUPPOSITORY RECTAL EVERY 6 HOURS PRN
Status: DISCONTINUED | OUTPATIENT
Start: 2023-03-02 | End: 2023-03-09 | Stop reason: HOSPADM

## 2023-03-02 RX ORDER — DEXTROSE MONOHYDRATE 50 MG/ML
INJECTION, SOLUTION INTRAVENOUS CONTINUOUS
Status: DISCONTINUED | OUTPATIENT
Start: 2023-03-02 | End: 2023-03-06

## 2023-03-02 RX ORDER — ACETAMINOPHEN 325 MG/1
650 TABLET ORAL EVERY 6 HOURS PRN
Status: DISCONTINUED | OUTPATIENT
Start: 2023-03-02 | End: 2023-03-09 | Stop reason: HOSPADM

## 2023-03-02 RX ORDER — LEVOTHYROXINE SODIUM 0.1 MG/1
100 TABLET ORAL
Status: DISCONTINUED | OUTPATIENT
Start: 2023-03-03 | End: 2023-03-09 | Stop reason: HOSPADM

## 2023-03-02 RX ADMIN — SEVELAMER CARBONATE 800 MG: 800 TABLET, FILM COATED ORAL at 00:48

## 2023-03-02 RX ADMIN — APIXABAN 2.5 MG: 2.5 TABLET, FILM COATED ORAL at 13:27

## 2023-03-02 RX ADMIN — PANTOPRAZOLE SODIUM 40 MG: 40 TABLET, DELAYED RELEASE ORAL at 06:06

## 2023-03-02 RX ADMIN — METOPROLOL TARTRATE 2.5 MG: 5 INJECTION INTRAVENOUS at 01:58

## 2023-03-02 RX ADMIN — ACETAMINOPHEN 650 MG: 325 TABLET ORAL at 21:05

## 2023-03-02 RX ADMIN — SODIUM CHLORIDE 250 ML: 9 INJECTION, SOLUTION INTRAVENOUS at 00:04

## 2023-03-02 RX ADMIN — LEVOTHYROXINE SODIUM 75 MCG: 0.07 TABLET ORAL at 06:06

## 2023-03-02 RX ADMIN — METOPROLOL TARTRATE 2.5 MG: 5 INJECTION INTRAVENOUS at 04:58

## 2023-03-02 RX ADMIN — DILTIAZEM HYDROCHLORIDE 10 MG: 5 INJECTION INTRAVENOUS at 11:34

## 2023-03-02 RX ADMIN — AMIODARONE HYDROCHLORIDE 0.5 MG/MIN: 50 INJECTION, SOLUTION INTRAVENOUS at 22:07

## 2023-03-02 RX ADMIN — DEXTROSE MONOHYDRATE 150 MG: 50 INJECTION, SOLUTION INTRAVENOUS at 13:15

## 2023-03-02 RX ADMIN — DEXTROSE 1 MG/MIN: 5 SOLUTION INTRAVENOUS at 13:32

## 2023-03-02 RX ADMIN — ACETAMINOPHEN 650 MG: 650 SUPPOSITORY RECTAL at 11:55

## 2023-03-02 RX ADMIN — ONDANSETRON 4 MG: 2 INJECTION INTRAMUSCULAR; INTRAVENOUS at 13:31

## 2023-03-02 RX ADMIN — SEVELAMER CARBONATE 800 MG: 800 TABLET, FILM COATED ORAL at 14:16

## 2023-03-02 RX ADMIN — APIXABAN 2.5 MG: 2.5 TABLET, FILM COATED ORAL at 00:48

## 2023-03-02 RX ADMIN — APIXABAN 2.5 MG: 2.5 TABLET, FILM COATED ORAL at 21:06

## 2023-03-02 RX ADMIN — SEVELAMER CARBONATE 800 MG: 800 TABLET, FILM COATED ORAL at 21:05

## 2023-03-02 RX ADMIN — DEXTROSE MONOHYDRATE: 50 INJECTION, SOLUTION INTRAVENOUS at 13:31

## 2023-03-02 RX ADMIN — MOMETASONE FUROATE AND FORMOTEROL FUMARATE DIHYDRATE 2 PUFF: 200; 5 AEROSOL RESPIRATORY (INHALATION) at 19:12

## 2023-03-02 ASSESSMENT — PAIN SCALES - GENERAL
PAINLEVEL_OUTOF10: 4
PAINLEVEL_OUTOF10: 0

## 2023-03-02 ASSESSMENT — PAIN DESCRIPTION - ORIENTATION: ORIENTATION: LEFT

## 2023-03-02 ASSESSMENT — PAIN DESCRIPTION - LOCATION: LOCATION: NECK

## 2023-03-02 ASSESSMENT — PAIN DESCRIPTION - DESCRIPTORS: DESCRIPTORS: ACHING;SHOOTING

## 2023-03-02 NOTE — PROGRESS NOTES
Hospitalist Progress Note    Daily Progress Note: 6502 5:64 PM      Radha Stacy                                            MRN: [id number]                                  OIQ:[birth year]      Subjective:     Pt examined and seen at bedside. Patient is somnolent, she awakens to answer questions, but easily falls asleep, she is alert to name place and situation. She denies chest pain, shortness of breath, palpitations. Patient is currently febrile with a heart rate of 140s, currently in atrial fibrillation. No acute events reported overnight. Labs and Vitals:  No labs to be drawn    Today's Plan:  Transferred to ICU due to atrial fibrillation, rate uncontrolled, plan is to place patient on IV amiodarone drip. Due to patient being encephalopathic patient is n.p.o. we will order low rate of D5 via IV to keep blood sugars corrected. Due to poor peripheral access consulted anesthesiologist and asked if they could place PICC/midline and placement to get IV amiodarone. Telephone consent received from daughter Yobani Vera. Awaiting on UA to be collected to rule out urinary tract infection. CT head pending    Objective:     BP (!) 110/50   Pulse (!) 135   Temp (!) 102.1 °F (38.9 °C) (Axillary)   Resp 20   Ht 5' 1\" (1.549 m)   Wt 156 lb 6.4 oz (70.9 kg)   SpO2 95%   BMI 29.55 kg/m²         Temp (24hrs), Av.7 °F (37.1 °C), Min:97.8 °F (36.6 °C), Max:102.1 °F (38.9 °C)      No intake/output data recorded. 1901 -  0700  In: 725 [I.V.:125]  Out: 1900     PHYSICAL EXAM:  Physical Exam  Vitals and nursing note reviewed. Constitutional:       Appearance: Normal appearance. She is febrile  HENT:      Head: Normocephalic and atraumatic. Mouth/Throat:      Mouth: Mucous membranes are moist.   Eyes:      Extraocular Movements: Extraocular movements intact. Pupils: Pupils are equal, round, and reactive to light.    Cardiovascular:      Rate and Rhythm: Atrial Fibrillation, rate uncontrolled. Pulses: Normal pulses. Heart sounds: Normal heart sounds. Pulmonary:      Effort: Pulmonary effort is normal.      Breath sounds: Normal breath sounds. Abdominal:      General: Abdomen is flat. Bowel sounds are normal.      Palpations: Abdomen is soft. Musculoskeletal:      Cervical back: Normal range of motion and neck supple. Skin:     General: Skin is warm and dry. Capillary Refill: Capillary refill takes less than 2 seconds. Neurological:      General: No focal deficit present. Mental Status: She is somnolent.   Psychiatric:         Mood and Affect: Mood normal.     Current Facility-Administered Medications   Medication Dose Route Frequency    apixaban (ELIQUIS) tablet 2.5 mg  2.5 mg Oral BID    amiodarone (CORDARONE) 150 mg in dextrose 5 % 100 mL bolus  150 mg IntraVENous Once    Followed by    amiodarone (CORDARONE) 450 mg in dextrose 5 % 250 mL infusion  1 mg/min IntraVENous Continuous    Followed by    amiodarone (CORDARONE) 450 mg in dextrose 5 % 250 mL infusion  0.5 mg/min IntraVENous Continuous    acetaminophen (TYLENOL) tablet 650 mg  650 mg Oral Q6H PRN    acetaminophen (TYLENOL) suppository 650 mg  650 mg Rectal Q6H PRN    albuterol sulfate HFA (PROVENTIL;VENTOLIN;PROAIR) 108 (90 Base) MCG/ACT inhaler 1 puff  1 puff Inhalation Q4H PRN    aluminum & magnesium hydroxide-simethicone (MAALOX) 200-200-20 MG/5ML suspension 10 mL  10 mL Oral Q6H PRN    carvedilol (COREG) tablet 25 mg  25 mg Oral BID WC    cinacalcet (SENSIPAR) tablet 30 mg  30 mg Oral Daily    pantoprazole (PROTONIX) tablet 40 mg  40 mg Oral QAM AC    levothyroxine (SYNTHROID) tablet 75 mcg  75 mcg Oral QAM AC    losartan (COZAAR) tablet 50 mg  50 mg Oral Daily    montelukast (SINGULAIR) tablet 10 mg  10 mg Oral Daily    sevelamer (RENVELA) tablet 800 mg  800 mg Oral TID    glucose chewable tablet 16 g  4 tablet Oral PRN    dextrose bolus 10% 125 mL  125 mL IntraVENous PRN    Or    dextrose bolus 10% 250 mL  250 mL IntraVENous PRN    glucagon injection 1 mg  1 mg SubCUTAneous PRN    dextrose 10 % infusion   IntraVENous Continuous PRN    ondansetron (ZOFRAN) injection 4 mg  4 mg IntraVENous Q6H PRN    ondansetron (ZOFRAN) injection 4 mg  4 mg IntraVENous Q6H PRN    mometasone-formoterol (DULERA) 200-5 MCG/ACT inhaler 2 puff  2 puff Inhalation BID        Assessment/Plan:     Acute Encephalopathy  -CT head pending  -continue to monitor glucose closely  -febrile  -UA ordered    Atrial Fibrillation:  -transferred to ICU for closer monitor  -chronic issue, rate uncontrolled  -Cardiologist consulted, starting IV Amiodarone  -anesthesia consulted for placement of midline/PICC    Intractable nausea, vomit, abdominal cramps:  -resolved  -Hx ESRD on  HD, no missed HD, states had last HD 2 days ago-on Monday  -has dicyclomine, zofran prn at home, continue. ESRD  -HD on MW  -nephrologist for HD continuance  -B/CR: 21/6.89, improved af HD  -from Dr Spears's/Issa's group. Hypertension:  -uncontrolled,  -holding oral medications for now due to mental status  -PRN Hydralazine     Depression:  -not wanting to eat, or drink, or get oob, ? ??clinical depression, not cooperative with interview, feels withdrawn. Non-suicidal.   -recommend pcp referral for psyche eval at d/c.       Hypoglycemia:  -holding oral intake due to mental status  -start low rate of D5  -monitor glucose closely    Hypothyroidism  -current TSH 13.90, increase from most recent TSH of 12.52  -will increase Synthroid to 100 mcg at this time     DVT Prophylaxis: Eliquis    Code Status: Full    Care Plan discussed with: Nursing and daughter     Clinical time 25 minutes with >50% of visit spent in counseling and coordination of care    Signed by: MALACHI Gaitan 3/2/2023

## 2023-03-02 NOTE — PROGRESS NOTES
Patient with poor venous access, patient is currently in atrial fibrillation requiring and Amiodarone gtt. Patient is somnolent, this writer spoke with the patient's daughter Nikita Lopez over the phone and received telephone consent to have PICC line placed in the the patient.

## 2023-03-02 NOTE — CONSULTS
[facility] CARDIOLOGY  CARDIOLOGY CONSULTATION    REASON FOR CONSULT:  Atrial flutter with RVR    REQUESTING PROVIDER:  Dr. Genet Iglesias:  N/V and abdominal cramping    HISTORY OF PRESENT ILLNESS:  Ms. Laurel Donovan is a 75 yo female with a past medical history of ESRD on HD, paroxysmal atrial flutter, chronic amiodarone use, hypertension who presented to the ED with nausea, vomiting, and abdominal cramps. Noted to be hypoglycemic despite no history of diabetes mellitus. She is occasionally non-compliant with HD. Consultation placed as she went into atrial flutter with RVR in the 120s up to 170 briefly (per tele tech). Rate did not improve with IV Lopressor. IV access was an issue and CRNAs were unable to place PICC and IJ vs. EJ. This morning she was noted to have a change in mental status and was sent for a stat head CT. Labs remain as pending given difficulty with access as stated. BS this morning 129. She is seen this morning following CT when being brought to the ICU. She responds to her name but is not verbal with answering questions. Rate remains in the 130s. Records from hospital admission course thus far reviewed. On admission troponin was negative. Labs otherwise consistent with ESRD. Chest x-ray with mild pulmonary edema. She was dialyzed yesterday. PAST MEDICAL HISTORY:  Notes above. HOME MEDICATIONS:  Home medications reviewed, no side effects. ALLERGIES:  Allergies reviewed. FAMILY HISTORY:  Family history reviewed, no premature cardiac disease. SOCIAL HISTORY:  Notable for no tobacco use, no heavy alcohol or illicit drug use. REVIEW OF SYSTEMS:  Complete review of systems performed, pertinents noted above, all other systems are negative. PHYSICAL EXAMINATION:  Vital sign assessment reveal a blood pressure of 129/66 and pulse rate of 128. Cardiovascular exam has a heart with an irregular rhythm and rate, normal S1 and S2.   No murmur present. There are no rubs or gallops. Good peripheral pulses. No jugular venous distension. Respiratory exam reveals clear lung fields, no rales or rhonchi. Gastrointestinal exam has soft, nontender abdomen with normal bowel sounds. Lymphatic exam reveals mild edema and no varicosities. No notable skin changes. Neurologic exam is notable for pt being somnolent except she does follow commands. Recent labs results and imaging reviewed. Discussed case with Dr. Scott Arana and our recommendations and impressions are as follows:    Atrial flutter with RVR:  Remains in RVR. Did not respond to IV Lopressor. Only IV access presently is a 22G in left foot. Will attempt IV diltiazem 10mg bolus while primary works on central line. Will then give amiodarone bolus followed by gtt. Continue Eliquis 2.5mg twice daily. Will obtain echo once rate is controlled. Awaiting labs to assess lytes, etc. She is seen by [facility] cardiology as has been on PO amiodarone for sometime per Care Everywhere. No known non-compliance with DOAC. EKG once rate is controlled. Altered mental status:  Stat head CT and labs pending. No labs yet due to IV access issues. No known missed doses of Eliquis. Echo once rate is controlled. Per Care Everywhere she had a cardiac cath in 2020 that showed tortuous vessels but no significant CAD. Hypertension:  At goal presently. Thank you for involving us in the care of this patient. Please do not hesitate to call if additional questions arise.

## 2023-03-02 NOTE — PROGRESS NOTES
1930  Received care of pt lying in bed with eyes closed.  Pt awakens after name called. Pt offers little conversation with assessment.  Pt just blankly stares at nurse.  Pt incontinence brief changed and blood sugar obtained and is 71.  Pt given juice to drink and pt only taking few sips and wont drink and just shrugs shoulders when asked why she wont drink juice to raise blood sugar.  Pt is about to go for dialysis and needs to increase blood sugar prior to going,    1945  Pt given glucose chewable to eat for low blood sugar. Pt chews part of it and spits out saying 'i just cant\".      1948  Glucagon 1mg sq given for low blood sugar prior to going to dialysis. Blood sugar rechecked and 86.  Pt to dialysis via bed and dialysis nurse updated.    2056  Called to dialysis to check pts blood sugar because pt feels nauseas.     Provider notified and does not wish to treat due to pts tendency to drop and she is not taking any food po.  Pt given zofran for nausea.    2337  Pt returned from dialysis in stable condition.  .  Pt accepted apple juice.    0001  Pt noted to have HR ranging 150-170.  Provider made aware and order received to give 250ml bolus of NS .    0030  HR ranging 140's at this time. Will continue to montior.  Provider aware.    0120  HR continues to range 130-150.  Provider made aware and EKG ordered at this time.    0158  Lopressor 2.5mg IV given as ordered.  /80 prior to med and provider aware and agrees with administering med at this time.    0458  Pt continues to be tachy at 140's.  Per provider an additional lopressor 2.5 mg given.  /72 prior to medication administration.    0600  Pt sleeping.  HR ranging 130-140.  Provider aware.

## 2023-03-02 NOTE — ANESTHESIA PROCEDURE NOTES
PICC/Midline:    A midline catheter    was placed using Seldinger and ultrasound guided in the ICU for the following indication(s): intravenous access. 3/2/2023 12:20 PM, 3/2/2023 12:48 PM.  Staffing  Performed: Resident/CRNA   Resident/CRNA: LISA Bone CRNASterility preparation included the following: hand hygiene performed prior to procedure, maximum sterile barriers used and sterile technique used to drape from head to toe. .  The  right,  cephalic vein was prepped. A 5 Fr (size), Chloraprep}lidocaine 1%, double lumen   Advanced 12 cm., Exposed 0 cm., Total 12 cm.    none  Preanesthetic Checklist  Completed: patient identified, IV checked, site marked, risks and benefits discussed, surgical/procedural consents, equipment checked, pre-op evaluation, timeout performed, anesthesia consent given, oxygen available, monitors applied/VS acknowledged, fire risk safety assessment completed and verbalized and blood product R/B/A discussed and consented

## 2023-03-02 NOTE — PROCEDURES
Hemodialysis / [phone]    Vitals Pre Post Assessment Pre Post   BP  159/89 123/81 LOC  See assessment  See assessment   HR  121 133 Lungs  See assessment  See assessment   Resp  16 16 Cardiac  See assessment  See assessment   Temp  98.4 97.8 Skin  See assessment  See assessment   Weight    Edema  See assessment  See assessment   Tele status  N/A  N/A Pain  0  0     Orders   Duration: Start:  2006 End: 2310 Total: 3 hrs   Dialyzer:  Revaclear 300   K Bath:  2 K   Ca Bath:  2.5 CA   Na:  138    Bicarb:  35   Target Fluid Removal:  2000 ml     Access   Type & Location: LUIS AVF: Pre-assessment: skin CDI. No s/s of infection. + B/T. No issues with cannulation. Running well at . Post assessment: No issues with hemostasis, + B/T remains. Dressing CDI.    Comments:                                        Labs   HBsAg (Antigen) / date:   Negative 2/13/2023                                            HBsAb (Antibody) / date:  Susceptible 2/13/2023   Source:  Epic   Obtained/Reviewed  Critical Results Called HGB   Date Value Ref Range Status   03/01/2023 8.3 (L) 12.1 - 17.0 g/dL Final     Potassium   Date Value Ref Range Status   03/01/2023 4.7 3.5 - 5.1 mmol/L Final     Calcium   Date Value Ref Range Status   03/01/2023 9.0 8.5 - 10.1 MG/DL Final     BUN   Date Value Ref Range Status   03/01/2023 80 (H) 6 - 20 MG/DL Final     Creatinine   Date Value Ref Range Status   03/01/2023 6.83 (H) 0.70 - 1.30 MG/DL Final        Meds Given   Name Dose Route   None ordered                 Adequacy / Fluid    Total Liters Process:  62 L   Net Fluid Removed:  1300 ml      Comments   Time Out Done:   (Time)  2002   Admitting Diagnosis:  Hypoglycemia   Consent obtained/signed:  Center Consent [address] [facility] /  #  [id number]   Primary Nurse Rpt Pre:  Marylin Zhang RN   Primary Nurse Rpt Post:  Letty Denny RN   Pt Education:  Procedural   Care Plan:  Ongoing   Pts outpatient clinic:       Tx Summary   SBAR received from Primary RN. Pt arrived to HD suite A&Ox4. Consent signed & on file OR Chronic consent applies. 2006: Pt cannulated with 00E needles per policy & without issue. VSS. Dialysis Tx initiated. 2050: Pt c/o not feeling well. When questioned, indicated she was nauseous. Primary RN called to check blood sugar as pt was low just before treatment. 2055: Primary RN in suite to check blood sugar and assess patient. 2105: Primary RN in suite to give pt PRN nausea medication. 2130: Pt's blood pressure dropped to 93/61, . UF turned off. 50 ml NS given. 2139: Blood pressure rechecked, 110/62, . UF resumed. Continue to monitor. 2215: UF goal reduced to 1500 ml d/t recurrent hypotension. 2245: UF off d/t hypotension. Finish treatment with no UF   2310: Tx ended. VSS. All possible blood returned to patient. Hemostasis achieved without issue. Bed locked and in the lowest position, call bell and belongings in reach. SBAR given to Primary, RN. Patient is stable at time of their departure. All Dialysis related medications have been reviewed.

## 2023-03-02 NOTE — PROGRESS NOTES
Chart reviewed   Patient admitted with Nausea / vomiting and Diarrhea , she missed HD this week , she has Hx of Non-compliance with dialysis   Dialysis today and continue MWF

## 2023-03-02 NOTE — PROGRESS NOTES
0700 - Assumed care of patient.    0800 - Attempted to get patient to eat or drink something for breakfast.  Patients brother at bedside, patient would wake up for about 2-3 seconds then fall back to sleep.  When patient was awake she was very lethargic.     0930 Notified DR Meza that due to patients condition, the patient was not able to eat breakfast or take any of her morning medications.  He ordered a STAT CT of head WO contrast.    1030 - Patient taken to CT.    1100 - Patient transferred to ICU bed 7, report given to A Lele MARTINEZ.

## 2023-03-02 NOTE — PROGRESS NOTES
1145 TRANSFER - IN REPORT:    Verbal report received from [facility] on [address]  being received from Med/Surg for change in patient condition (SVT)      Report consisted of patient's Situation, Background, Assessment and   Recommendations(SBAR). Information from the following report(s) Nurse Handoff Report, Adult Overview, Intake/Output, MAR, Recent Results, Med Rec Status, and Cardiac Rhythm SVT  was reviewed with the receiving nurse. Opportunity for questions and clarification was provided. Assessment completed upon patient's arrival to unit and care assumed. Pt arouses to stimuli however quickly closes eyes. Pt unable to stay awake and focused enough for anything PO. Pt temp 102.1 axillary. ROLA Gutierrez notified. New orders given. 1200 650mg tylenol suppository administered. Pt changed of incontinent BM. Purewick, canister, and tubing changed. 1915 Bedside shift change report given to DANA Bishop RN (oncoming nurse) by CLAUDIO Rojas RN (offgoing nurse). Report included the following information Nurse Handoff Report, Adult Overview, Intake/Output, MAR, Recent Results, Med Rec Status, and Cardiac Rhythm Afib .

## 2023-03-02 NOTE — PROGRESS NOTES
Called to patient's bedside for tachycardia. Patient started getting tachycardic while in dialysis. Patient has been to the 150s and 160s gave her a 250 bolus that helped some brought it down and then gave 2.5 mg of Lopressor. I did help momentarily but then the heart rate goes back up. EKG was done no acute changes from prior was found just tachycardic rhythm. We will observe, I have to be gentle with the antiarrhythmic beta-blocker medication given patient's blood pressure is soft. I have to withhold a large amount of fluid bolus given her history of end-stage renal failure with dialysis. 5 I asked the nurse for new vital signs to make a decision about additional beta-blocker medicine    3686 EP 589G systolic 2.5 of Lopressor ordered for tachycardia.     Patient continues to act as usual flat affect not much response does nod and say no to pain    Second dose of Lopressor has brought heart rate down around the 120s patient continues with no symptoms awake and alert nonverbal    Guille Beverly ENP-C, FNP-C

## 2023-03-03 ENCOUNTER — APPOINTMENT (OUTPATIENT)
Age: 68
DRG: 682 | End: 2023-03-03
Payer: MEDICARE

## 2023-03-03 LAB
ANION GAP SERPL CALC-SCNC: 9 MMOL/L (ref 3–18)
BUN SERPL-MCNC: 27 MG/DL (ref 7–18)
BUN/CREAT SERPL: 3 (ref 12–20)
CA-I BLD-MCNC: 8.3 MG/DL (ref 8.5–10.1)
CHLORIDE SERPL-SCNC: 95 MMOL/L (ref 100–111)
CO2 SERPL-SCNC: 28 MMOL/L (ref 21–32)
CREAT SERPL-MCNC: 7.99 MG/DL (ref 0.6–1.3)
ECHO AO ASC DIAM: 3.3 CM
ECHO AO ASCENDING AORTA INDEX: 1.94 CM/M2
ECHO AO ROOT DIAM: 3.5 CM
ECHO AO ROOT INDEX: 2.06 CM/M2
ECHO AV AREA PEAK VELOCITY: 2.5 CM2
ECHO AV AREA VTI: 2.9 CM2
ECHO AV AREA/BSA PEAK VELOCITY: 1.5 CM2/M2
ECHO AV AREA/BSA VTI: 1.7 CM2/M2
ECHO AV MEAN GRADIENT: 5 MMHG
ECHO AV MEAN VELOCITY: 1 M/S
ECHO AV PEAK GRADIENT: 10 MMHG
ECHO AV PEAK VELOCITY: 1.6 M/S
ECHO AV VELOCITY RATIO: 0.81
ECHO AV VTI: 29.3 CM
ECHO BSA: 1.75 M2
ECHO EST RA PRESSURE: 3 MMHG
ECHO IVC PROX: 1.9 CM
ECHO LA AREA 2C: 26.4 CM2
ECHO LA AREA 4C: 23.2 CM2
ECHO LA DIAMETER INDEX: 2.18 CM/M2
ECHO LA DIAMETER: 3.7 CM
ECHO LA MAJOR AXIS: 6.3 CM
ECHO LA MINOR AXIS: 5.8 CM
ECHO LA TO AORTIC ROOT RATIO: 1.06
ECHO LA VOL 2C: 100 ML (ref 22–52)
ECHO LA VOL 4C: 69 ML (ref 22–52)
ECHO LA VOL BP: 86 ML (ref 22–52)
ECHO LA VOL/BSA BIPLANE: 51 ML/M2 (ref 16–34)
ECHO LA VOLUME INDEX A2C: 59 ML/M2 (ref 16–34)
ECHO LA VOLUME INDEX A4C: 41 ML/M2 (ref 16–34)
ECHO LV E' LATERAL VELOCITY: 12 CM/S
ECHO LV E' SEPTAL VELOCITY: 6 CM/S
ECHO LV EDV A2C: 65 ML
ECHO LV EDV A4C: 50 ML
ECHO LV EDV INDEX A4C: 29 ML/M2
ECHO LV EDV NDEX A2C: 38 ML/M2
ECHO LV EJECTION FRACTION A2C: 61 %
ECHO LV EJECTION FRACTION A4C: 63 %
ECHO LV EJECTION FRACTION BIPLANE: 63 % (ref 55–100)
ECHO LV ESV A2C: 26 ML
ECHO LV ESV A4C: 19 ML
ECHO LV ESV INDEX A2C: 15 ML/M2
ECHO LV ESV INDEX A4C: 11 ML/M2
ECHO LV FRACTIONAL SHORTENING: 30 % (ref 28–44)
ECHO LV INTERNAL DIMENSION DIASTOLE INDEX: 2.59 CM/M2
ECHO LV INTERNAL DIMENSION DIASTOLIC: 4.4 CM (ref 3.9–5.3)
ECHO LV INTERNAL DIMENSION SYSTOLIC INDEX: 1.82 CM/M2
ECHO LV INTERNAL DIMENSION SYSTOLIC: 3.1 CM
ECHO LV IVSD: 1.5 CM (ref 0.6–0.9)
ECHO LV MASS 2D: 240.3 G (ref 67–162)
ECHO LV MASS INDEX 2D: 141.3 G/M2 (ref 43–95)
ECHO LV POSTERIOR WALL DIASTOLIC: 1.3 CM (ref 0.6–0.9)
ECHO LV RELATIVE WALL THICKNESS RATIO: 0.59
ECHO LVOT AREA: 3.1 CM2
ECHO LVOT AV VTI INDEX: 0.91
ECHO LVOT DIAM: 2 CM
ECHO LVOT MEAN GRADIENT: 4 MMHG
ECHO LVOT PEAK GRADIENT: 7 MMHG
ECHO LVOT PEAK VELOCITY: 1.3 M/S
ECHO LVOT STROKE VOLUME INDEX: 49.5 ML/M2
ECHO LVOT SV: 84.2 ML
ECHO LVOT VTI: 26.8 CM
ECHO MV A VELOCITY: 0.68 M/S
ECHO MV AREA VTI: 3 CM2
ECHO MV E DECELERATION TIME (DT): 237 MS
ECHO MV E VELOCITY: 0.74 M/S
ECHO MV E/A RATIO: 1.09
ECHO MV E/E' LATERAL: 6.17
ECHO MV E/E' RATIO (AVERAGED): 9.25
ECHO MV E/E' SEPTAL: 12.33
ECHO MV LVOT VTI INDEX: 1.05
ECHO MV MAX VELOCITY: 0.9 M/S
ECHO MV MEAN GRADIENT: 2 MMHG
ECHO MV MEAN VELOCITY: 0.7 M/S
ECHO MV PEAK GRADIENT: 3 MMHG
ECHO MV VTI: 28.2 CM
ECHO PV MAX VELOCITY: 1.2 M/S
ECHO PV PEAK GRADIENT: 5 MMHG
ECHO RA AREA 4C: 16.6 CM2
ECHO RIGHT VENTRICULAR SYSTOLIC PRESSURE (RVSP): 29 MMHG
ECHO RV BASAL DIMENSION: 4.7 CM
ECHO RV GLOBAL SYSTOLIC STRAIN (GLS): -22 %
ECHO RV LONGITUDINAL DIMENSION: 7.6 CM
ECHO RV MID DIMENSION: 3.5 CM
ECHO RV TAPSE: 1.9 CM (ref 1.7–?)
ECHO TV REGURGITANT MAX VELOCITY: 2.55 M/S
ECHO TV REGURGITANT PEAK GRADIENT: 26 MMHG
GLUCOSE BLD STRIP.AUTO-MCNC: 112 MG/DL (ref 70–110)
GLUCOSE BLD STRIP.AUTO-MCNC: 126 MG/DL (ref 70–110)
GLUCOSE BLD STRIP.AUTO-MCNC: 158 MG/DL (ref 70–110)
GLUCOSE BLD STRIP.AUTO-MCNC: 77 MG/DL (ref 70–110)
GLUCOSE BLD STRIP.AUTO-MCNC: 81 MG/DL (ref 70–110)
GLUCOSE BLD STRIP.AUTO-MCNC: 88 MG/DL (ref 70–110)
GLUCOSE BLD STRIP.AUTO-MCNC: 95 MG/DL (ref 70–110)
GLUCOSE SERPL-MCNC: 103 MG/DL (ref 74–99)
LV EF: 63 %
LVEF MODALITY: ABNORMAL
PERFORMED BY:: ABNORMAL
PERFORMED BY:: NORMAL
POTASSIUM SERPL-SCNC: 4.8 MMOL/L (ref 3.5–5.5)
SODIUM SERPL-SCNC: 132 MMOL/L (ref 136–145)
T4 SERPL-MCNC: 10.2 UG/DL (ref 4.8–13.9)

## 2023-03-03 PROCEDURE — 80048 BASIC METABOLIC PNL TOTAL CA: CPT

## 2023-03-03 PROCEDURE — 6360000002 HC RX W HCPCS: Performed by: NURSE PRACTITIONER

## 2023-03-03 PROCEDURE — 82962 GLUCOSE BLOOD TEST: CPT

## 2023-03-03 PROCEDURE — 6370000000 HC RX 637 (ALT 250 FOR IP): Performed by: NURSE PRACTITIONER

## 2023-03-03 PROCEDURE — 94761 N-INVAS EAR/PLS OXIMETRY MLT: CPT

## 2023-03-03 PROCEDURE — 2580000003 HC RX 258: Performed by: NURSE PRACTITIONER

## 2023-03-03 PROCEDURE — 93356 MYOCRD STRAIN IMG SPCKL TRCK: CPT

## 2023-03-03 PROCEDURE — 1100000000 HC RM PRIVATE

## 2023-03-03 PROCEDURE — 6360000002 HC RX W HCPCS: Performed by: INTERNAL MEDICINE

## 2023-03-03 PROCEDURE — 90935 HEMODIALYSIS ONE EVALUATION: CPT

## 2023-03-03 PROCEDURE — 6370000000 HC RX 637 (ALT 250 FOR IP): Performed by: INTERNAL MEDICINE

## 2023-03-03 PROCEDURE — 94640 AIRWAY INHALATION TREATMENT: CPT

## 2023-03-03 PROCEDURE — 2000000000 HC ICU R&B

## 2023-03-03 PROCEDURE — 36415 COLL VENOUS BLD VENIPUNCTURE: CPT

## 2023-03-03 RX ORDER — AMIODARONE HYDROCHLORIDE 200 MG/1
200 TABLET ORAL DAILY
Status: DISCONTINUED | OUTPATIENT
Start: 2023-03-03 | End: 2023-03-08

## 2023-03-03 RX ORDER — AMIODARONE HYDROCHLORIDE 200 MG/1
200 TABLET ORAL DAILY
Status: DISCONTINUED | OUTPATIENT
Start: 2023-03-03 | End: 2023-03-03

## 2023-03-03 RX ADMIN — SEVELAMER CARBONATE 800 MG: 800 TABLET, FILM COATED ORAL at 21:58

## 2023-03-03 RX ADMIN — ONDANSETRON 4 MG: 2 INJECTION INTRAMUSCULAR; INTRAVENOUS at 20:10

## 2023-03-03 RX ADMIN — LOSARTAN POTASSIUM 50 MG: 25 TABLET, FILM COATED ORAL at 10:05

## 2023-03-03 RX ADMIN — APIXABAN 2.5 MG: 2.5 TABLET, FILM COATED ORAL at 21:53

## 2023-03-03 RX ADMIN — SEVELAMER CARBONATE 800 MG: 800 TABLET, FILM COATED ORAL at 10:04

## 2023-03-03 RX ADMIN — AMIODARONE HYDROCHLORIDE 0.5 MG/MIN: 50 INJECTION, SOLUTION INTRAVENOUS at 15:02

## 2023-03-03 RX ADMIN — AMIODARONE HYDROCHLORIDE 200 MG: 200 TABLET ORAL at 20:20

## 2023-03-03 RX ADMIN — CINACALCET HYDROCHLORIDE 30 MG: 30 TABLET, FILM COATED ORAL at 10:05

## 2023-03-03 RX ADMIN — MOMETASONE FUROATE AND FORMOTEROL FUMARATE DIHYDRATE 2 PUFF: 200; 5 AEROSOL RESPIRATORY (INHALATION) at 20:08

## 2023-03-03 RX ADMIN — PANTOPRAZOLE SODIUM 40 MG: 40 TABLET, DELAYED RELEASE ORAL at 06:06

## 2023-03-03 RX ADMIN — MONTELUKAST 10 MG: 10 TABLET, FILM COATED ORAL at 10:05

## 2023-03-03 RX ADMIN — CARVEDILOL 25 MG: 12.5 TABLET, FILM COATED ORAL at 10:04

## 2023-03-03 RX ADMIN — LEVOTHYROXINE SODIUM 100 MCG: 0.1 TABLET ORAL at 06:06

## 2023-03-03 RX ADMIN — APIXABAN 2.5 MG: 2.5 TABLET, FILM COATED ORAL at 10:05

## 2023-03-03 RX ADMIN — ONDANSETRON 4 MG: 2 INJECTION INTRAMUSCULAR; INTRAVENOUS at 10:14

## 2023-03-03 ASSESSMENT — PAIN SCALES - GENERAL
PAINLEVEL_OUTOF10: 0

## 2023-03-03 NOTE — DIALYSIS
Hemodialysis treatment note 3/3/2023    Arrived to bedside, patient drowsy but wakes to voice, answers questions appropriately, A&Ox4. Identity confirmed at bedside.     See flowsheet for VS.    HepBsAg - Negative 2/10/23 (Epic)  HepBsAb - Susceptible 2/10/23 ([facility])    LUE AVF CDI, + Bruit and Thrill.  Site cleaned per P&P, accessed with 15g needle x2, + flashback in each needle limb, followed by 10cc NS flush, no c/o pain, no signs of swelling, no resistance noted upon flush. Needle limbs secured with tape, chevrons placed for additional limb security.     HD treatment initiated at 1614. Blood pump running well at 400.   1845 - Patient c/o palpitations and SOB. HR 80. /46. SPO2 98% on room air. Primary RN notified. 2L NC applied for comfort.   1914 - All possible blood returned to patient.  Needles removed, manual pressure applied over puncture sites with clean dry folded 2x2 for approximately 8 minutes. Hemostasis achieved. Puncture sites covered with clean dry folded 2x2, secured with tape. The patient remained drowsy throughout treatment, though she was able to wake to voice, respond appropriately, and follow commands. Report to primary RN.

## 2023-03-03 NOTE — PROGRESS NOTES
CARDIOLOGY PROGRESS NOTE - NP    Patient seen and examined. This is a patient who is followed for atrial flutter with RVR. She is more alert than yesterday but still somewhat somnolent. Converted to NSR yesterday evening on amiodarone gtt. She reports general malaise but no specific complaints. No chest pain or dyspnea. No palpitations. No nausea or vomiting. No other complaints reported. Telemetry reviewed, there were no events noted in the past 24 hours. Converted to NSR yesterday evening. Pertinent review of systems items noted above, all other systems are negative. Current medications reviewed. Physical Examination  Vital signs are stable. Blood pressure 142/59, Pulse 84  No apparent distress. Heart is regular, rate and rhythm. Normal S1, S2, no murmurs are appreciated. Lungs are clear bilaterally. Abdomen is soft, nontender, normal bowel sounds. Extremities have no edema. Labs reviewed: TSH 13.9    Case discussed with Dr. Chiara Jenkins and our impression and recommendations are as follows:  Atrial flutter with RVR:  Spontaneously converted to NSR on amiodarone gtt following bolus. She was on chronic amiodarone therapy as an OP per primary cardiologist.  Amiodarone 200mg daily initiated by primary. Ideally she would not remain on this long term given young age, however, given its efficacy in this acute setting we will continue for now. She denies having been offered an ablation previously. She is agreeable to an EP consultation at [facility] to discuss rhythm management options. Continue Eliquis 2.5mg twice daily. Echo pending now that rate is controlled. No known non-compliance with DOAC. TSH 13.9. Will start low dose supplementation and have her follow-up with her primary. Altered mental status:  Stat head CT and labs unremarkable. Mental acuity is improving though is still somnolent. IV access difficult but now has PICC placed. No known missed doses of Eliquis. Echo pendin. Per Care Everywhere she had a cardiac cath in 2020 that showed tortuous vessels but no significant CAD. Hypertension:  Acceptable. Plans for HD today. Will schedule an EP consultation with Dr. Tammi Richard in the coming weeks. Please do not hesitate to call me or Dr. Tim Zamora if additional questions arise.

## 2023-03-03 NOTE — PROGRESS NOTES
INTERNAL MEDICINE PROGRESS NOTE      Patient: Irlanda Solis   YOB: 1955   MRN: [id number]      Hospital course / Assessment and Plans   Principle Problems:  Acute Encephalopathy  - secondary to ESRF as she missed dialysis   -CT head no acute process  -improved with HD  Atrial Fibrillation:  - secondary to the patient not taking her medication due uremic anorexia and vomiting  -transferred to ICU for closer monitor, starting IV Amiodarone  - Converted to NSR, transition to PO after HD and able to take PO  Intractable nausea, vomit, abdominal cramps:  - secondary yo uremia syndrome - improved   -has dicyclomine, zofran prn at home, continue. ESRD  -HD on MW  -nephrologist for HD   Hypertension:  -holding oral medications for now due to mental status  -PRN Hydralazine   Depression:  -not wanting to eat, or drink, or get oob,  Non-suicidal., may related to uremic syndrome  -recommend pcp referral for psyche eval at d/c.    Hypoglycemia:  -started on low rate of D5  -monitor glucose closely  Hypothyroidism  -current TSH 13.90, increase from most recent TSH of 12.52  -will increase Synthroid to 100 mcg at this time  NON-COMPLIANCE       Full Code   DVT prophylaxis: pharmacologic and mechanical    Disposition    Disposition: HOME     Subjective / ROS:   Patient states she still not feeling well      Medical Decision Making   Chart, Images and Lab data reviewed, necessary medical Orders placed   Discussed with nursing staff     Vitals:    23 0740 23 1004 23 1237 23 1251   BP: (!) 167/66 (!) 142/59 (!) 142/59 139/66   Pulse: 85 84  74   Resp: 21   10   Temp:    (!) 100.8 °F (38.2 °C)   TempSrc:    Axillary   SpO2:    99%   Weight:   156 lb (70.8 kg)    Height:   5' 1\" (1.549 m)      Temp (24hrs), Av.2 °F (37.3 °C), Min:98.1 °F (36.7 °C), Max:100.8 °F (38.2 °C)      Intake/Output Summary (Last 24 hours) at 3/3/2023 1353  Last data filed at 3/3/2023 1024  Gross per 24 hour   Intake 240 ml   Output --   Net 240 ml       Physical Exam:   General Appearance:   Appears in no acute distress.,  HEENT:   Moist oral mucous membranes, conjunctiva clear,   Neck:   Supple  Lungs: No wheezes. , No rales. , Normal respiratory effort,   Heart:   Regular rate and rhythm  Abdomen:   Soft , Non-distended and Non-tender,   Extremities:   no edema of legs  Neuro:   alert, oriented, moves all extremities well    Current medications:     Current Facility-Administered Medications   Medication Dose Route Frequency    amiodarone (CORDARONE) tablet 200 mg  200 mg Oral Daily    apixaban (ELIQUIS) tablet 2.5 mg  2.5 mg Oral BID    amiodarone (CORDARONE) 450 mg in dextrose 5 % 250 mL infusion  0.5 mg/min IntraVENous Continuous    acetaminophen (TYLENOL) tablet 650 mg  650 mg Oral Q6H PRN    acetaminophen (TYLENOL) suppository 650 mg  650 mg Rectal Q6H PRN    dextrose 5 % solution   IntraVENous Continuous    levothyroxine (SYNTHROID) tablet 100 mcg  100 mcg Oral QAM AC    albuterol sulfate HFA (PROVENTIL;VENTOLIN;PROAIR) 108 (90 Base) MCG/ACT inhaler 1 puff  1 puff Inhalation Q4H PRN    aluminum & magnesium hydroxide-simethicone (MAALOX) 200-200-20 MG/5ML suspension 10 mL  10 mL Oral Q6H PRN    carvedilol (COREG) tablet 25 mg  25 mg Oral BID WC    cinacalcet (SENSIPAR) tablet 30 mg  30 mg Oral Daily    pantoprazole (PROTONIX) tablet 40 mg  40 mg Oral QAM AC    losartan (COZAAR) tablet 50 mg  50 mg Oral Daily    montelukast (SINGULAIR) tablet 10 mg  10 mg Oral Daily    sevelamer (RENVELA) tablet 800 mg  800 mg Oral TID    glucose chewable tablet 16 g  4 tablet Oral PRN    dextrose bolus 10% 125 mL  125 mL IntraVENous PRN    Or    dextrose bolus 10% 250 mL  250 mL IntraVENous PRN    glucagon injection 1 mg  1 mg SubCUTAneous PRN    dextrose 10 % infusion   IntraVENous Continuous PRN    ondansetron (ZOFRAN) injection 4 mg  4 mg IntraVENous Q6H PRN    ondansetron (ZOFRAN) injection 4 mg  4 mg IntraVENous Q6H PRN mometasone-formoterol (DULERA) 200-5 MCG/ACT inhaler 2 puff  2 puff Inhalation BID          Laboratory and Radiology Data :    Last 3 CK, CKMB, Troponin: No results for input(s): CKTOTAL, CKMB, TROPONINI in the last 72 hours.      Recent Results (from the past 24 hour(s))   POCT Glucose    Collection Time: 03/02/23  4:16 PM   Result Value Ref Range    POC Glucose 124 (H) 70 - 110 mg/dL    Performed by: Jovi Ng    POCT Glucose    Collection Time: 03/02/23  7:57 PM   Result Value Ref Range    POC Glucose 109 70 - 110 mg/dL    Performed by: Bartolome American    POCT Glucose    Collection Time: 03/03/23 12:59 AM   Result Value Ref Range    POC Glucose 126 (H) 70 - 110 mg/dL    Performed by: Bartolome American    Basic Metabolic Panel    Collection Time: 03/03/23  4:00 AM   Result Value Ref Range    Sodium 132 (L) 136 - 145 mmol/L    Potassium 4.8 3.5 - 5.5 mmol/L    Chloride 95 (L) 100 - 111 mmol/L    CO2 28 21 - 32 mmol/L    Anion Gap 9 3.0 - 18.0 mmol/L    Glucose 103 (H) 74 - 99 mg/dL    BUN 27 (H) 7 - 18 mg/dL    Creatinine 7.99 (H) 0.60 - 1.30 mg/dL    Bun/Cre Ratio 3 (L) 12 - 20      Est, Glom Filt Rate 5 (L) >60 ml/min/1.73m2    Calcium 8.3 (L) 8.5 - 10.1 mg/dL   POCT Glucose    Collection Time: 03/03/23  4:26 AM   Result Value Ref Range    POC Glucose 112 (H) 70 - 110 mg/dL    Performed by: Bartolome American    POCT Glucose    Collection Time: 03/03/23  8:22 AM   Result Value Ref Range    POC Glucose 95 70 - 110 mg/dL    Performed by: Jocelyne Corral    POCT Glucose    Collection Time: 03/03/23 12:45 PM   Result Value Ref Range    POC Glucose 158 (H) 70 - 110 mg/dL    Performed by: Jocelyne Corral    Transthoracic echocardiogram (TTE) complete with contrast, bubble, strain, and 3D PRN    Collection Time: 03/03/23  1:48 PM   Result Value Ref Range    LV EDV A2C 65 mL    LV EDV A4C 50 mL    LV ESV A2C 26 mL    LV ESV A4C 19 mL    IVSd 1.5 (A) 0.6 - 0.9 cm    LVIDd 4.4 3.9 - 5.3 cm    LVIDs 3.1 cm    LVOT Diameter 2.0 cm    LVOT Mean Gradient 4 mmHg    LVOT VTI 26.8 cm    LVOT Peak Velocity 1.3 m/s    LVOT Peak Gradient 7 mmHg    LVPWd 1.3 (A) 0.6 - 0.9 cm    LV E' Lateral Velocity 12 cm/s    LV E' Septal Velocity 6 cm/s    LV Ejection Fraction A2C 61 %    LV Ejection Fraction A4C 63 %    EF BP 63 55 - 100 %    LVOT Area 3.1 cm2    LVOT SV 84.0 ml    LA Minor Axis 5.8 cm    LA Major Axis 6.3 cm    LA Area 2C 26.4 cm2    LA Area 4C 23.2 cm2    LA Volume 2C 100 (A) 22 - 52 mL    LA Volume 4C 69 (A) 22 - 52 mL    LA Volume BP 86 (A) 22 - 52 mL    LA Diameter 3.7 cm    AV Mean Gradient 5 mmHg    AV VTI 29.3 cm    AV Mean Velocity 1.0 m/s    AV Peak Velocity 1.6 m/s    AV Peak Gradient 10 mmHg    AV Area by VTI 2.9 cm2    AV Area by Peak Velocity 2.5 cm2    Aortic Root 3.5 cm    Ascending Aorta 3.3 cm    IVC Proxmal 1.9 cm    MV E Wave Deceleration Time 237.0 ms    MV A Velocity 0.68 m/s    MV E Velocity 0.74 m/s    MV Mean Gradient 2 mmHg    MV VTI 28.2 cm    MV Mean Velocity 0.7 m/s    MV Max Velocity 0.9 m/s    MV Peak Gradient 3 mmHg    MV Area by PHT 3.1 cm2    MV Area by VTI 3.0 cm2    PV Max Velocity 1.2 m/s    PV Peak Gradient 5 mmHg    Est. RA Pressure 3 mmHg    RV Basal Dimension 4.7 cm    RV Longitudinal Dimension 7.6 cm    RV Mid Dimension 3.5 cm    RV GLS -22.0 %    TAPSE 1.9 1.7 cm    TR Max Velocity 2.55 m/s    TR Peak Gradient 26 mmHg       Results       Procedure Component Value Units Date/Time    Rapid influenza A/B antigens [[id number]]     Order Status: Canceled Specimen: Nasopharyngeal     Rapid influenza A/B antigens [[id number]] Collected: 02/26/23 1522    Order Status: Completed Specimen: Nasal Washing Updated: 02/26/23 1602     Influenza A Ag Negative        Influenza B Ag Negative                Imaging results impression onlyCT ABDOMEN PELVIS WO CONTRAST Additional Contrast? None    Result Date: 2/26/2023  1.  No acute abnormality    CT HEAD WO CONTRAST    Result Date: 3/2/2023  No acute intracranial abnormality. XR CHEST PORTABLE    Result Date: 3/1/2023  No retained foreign body or pneumothorax Mild interstitial edema The mediastinum is not optimally evaluated on portable radiography    XR CHEST PORTABLE    Result Date: 3/1/2023  No acute process. XR CHEST PORTABLE    Result Date: 2/26/2023  1. Decreased central pulmonary vascular congestion      CT HEAD WO CONTRAST   Final Result   No acute intracranial abnormality. XR CHEST PORTABLE   Final Result      No retained foreign body or pneumothorax   Mild interstitial edema   The mediastinum is not optimally evaluated on portable radiography      XR CHEST PORTABLE   Final Result   No acute process.                       Nirmala Quinn M.D.

## 2023-03-03 NOTE — PROGRESS NOTES
0900:  Patient is A&OX4, but extremely drowsy this morning with constant need for verbal or physical stimuli to maintain focus/attention. 36:  Patient's daughter and sister are at bedside, opportunities for questions and concerns provided per request of patient's daughter. MD rounded on patient and spoke with family as well. 1251: Attempt to give patient PO amiodarone per doctor's order to try and take her off the gtt was unsuccessful. Patient states the medication PO made her nauseated and she proceeded to vomit during her echo almost immediately. MD notified, would like to try PO amiodarone again after patient is dialyzed this evening and then turn the gtt off 2 hours after starting PO if patient tolerates. 1500:  Patient is resting quietly with her eyes closed with no s/s of distress throughout this time. She rouses to verbal stimuli and continues to be A&OX4.    1744:  Patient receiving dialysis at this time.

## 2023-03-03 NOTE — PROGRESS NOTES
Maryjane Solano notified of need for dialysis. Per  dialysis nurse is on the way to facility at this time.

## 2023-03-03 NOTE — FLOWSHEET NOTE
03/03/23 0700   Handoff   Communication Given Shift Handoff   Handoff Given To Joellen Vela RN   Handoff Received From Fide Flores RN   Handoff Communication Face to Face; At bedside   Time Handoff Given 0700   End of Shift Check Performed Yes

## 2023-03-03 NOTE — PROGRESS NOTES
Chart reviewed   New events noted   Starting amiodarone gtt , ok for a PICC line   Dialysis tomorrow

## 2023-03-04 LAB
ANION GAP SERPL CALC-SCNC: 11 MMOL/L (ref 3–18)
BUN SERPL-MCNC: 10 MG/DL (ref 7–18)
BUN/CREAT SERPL: 2 (ref 12–20)
CA-I BLD-MCNC: 8.1 MG/DL (ref 8.5–10.1)
CHLORIDE SERPL-SCNC: 96 MMOL/L (ref 100–111)
CO2 SERPL-SCNC: 29 MMOL/L (ref 21–32)
CREAT SERPL-MCNC: 4.45 MG/DL (ref 0.6–1.3)
GLUCOSE BLD STRIP.AUTO-MCNC: 104 MG/DL (ref 70–110)
GLUCOSE BLD STRIP.AUTO-MCNC: 86 MG/DL (ref 70–110)
GLUCOSE BLD STRIP.AUTO-MCNC: 86 MG/DL (ref 70–110)
GLUCOSE BLD STRIP.AUTO-MCNC: 87 MG/DL (ref 70–110)
GLUCOSE BLD STRIP.AUTO-MCNC: 89 MG/DL (ref 70–110)
GLUCOSE BLD STRIP.AUTO-MCNC: 91 MG/DL (ref 70–110)
GLUCOSE SERPL-MCNC: 88 MG/DL (ref 74–99)
PERFORMED BY:: NORMAL
POTASSIUM SERPL-SCNC: 3.8 MMOL/L (ref 3.5–5.5)
SODIUM SERPL-SCNC: 136 MMOL/L (ref 136–145)

## 2023-03-04 PROCEDURE — 2000000000 HC ICU R&B

## 2023-03-04 PROCEDURE — 6370000000 HC RX 637 (ALT 250 FOR IP): Performed by: NURSE PRACTITIONER

## 2023-03-04 PROCEDURE — 6370000000 HC RX 637 (ALT 250 FOR IP): Performed by: INTERNAL MEDICINE

## 2023-03-04 PROCEDURE — 6360000002 HC RX W HCPCS: Performed by: INTERNAL MEDICINE

## 2023-03-04 PROCEDURE — 2580000003 HC RX 258: Performed by: NURSE PRACTITIONER

## 2023-03-04 PROCEDURE — 80048 BASIC METABOLIC PNL TOTAL CA: CPT

## 2023-03-04 PROCEDURE — 94640 AIRWAY INHALATION TREATMENT: CPT

## 2023-03-04 PROCEDURE — 94761 N-INVAS EAR/PLS OXIMETRY MLT: CPT

## 2023-03-04 PROCEDURE — 82962 GLUCOSE BLOOD TEST: CPT

## 2023-03-04 PROCEDURE — 1100000000 HC RM PRIVATE

## 2023-03-04 PROCEDURE — 36415 COLL VENOUS BLD VENIPUNCTURE: CPT

## 2023-03-04 RX ADMIN — APIXABAN 2.5 MG: 2.5 TABLET, FILM COATED ORAL at 08:54

## 2023-03-04 RX ADMIN — MOMETASONE FUROATE AND FORMOTEROL FUMARATE DIHYDRATE 2 PUFF: 200; 5 AEROSOL RESPIRATORY (INHALATION) at 19:59

## 2023-03-04 RX ADMIN — PANTOPRAZOLE SODIUM 40 MG: 40 TABLET, DELAYED RELEASE ORAL at 06:33

## 2023-03-04 RX ADMIN — CARVEDILOL 25 MG: 12.5 TABLET, FILM COATED ORAL at 17:32

## 2023-03-04 RX ADMIN — ALBUTEROL SULFATE 1 PUFF: 108 AEROSOL, METERED RESPIRATORY (INHALATION) at 22:11

## 2023-03-04 RX ADMIN — ONDANSETRON 4 MG: 2 INJECTION INTRAMUSCULAR; INTRAVENOUS at 04:51

## 2023-03-04 RX ADMIN — AMIODARONE HYDROCHLORIDE 200 MG: 200 TABLET ORAL at 08:55

## 2023-03-04 RX ADMIN — ONDANSETRON 4 MG: 2 INJECTION INTRAMUSCULAR; INTRAVENOUS at 16:01

## 2023-03-04 RX ADMIN — SEVELAMER CARBONATE 800 MG: 800 TABLET, FILM COATED ORAL at 20:38

## 2023-03-04 RX ADMIN — LEVOTHYROXINE SODIUM 100 MCG: 0.1 TABLET ORAL at 06:33

## 2023-03-04 RX ADMIN — CARVEDILOL 25 MG: 12.5 TABLET, FILM COATED ORAL at 08:55

## 2023-03-04 RX ADMIN — APIXABAN 2.5 MG: 2.5 TABLET, FILM COATED ORAL at 20:38

## 2023-03-04 RX ADMIN — ACETAMINOPHEN 650 MG: 325 TABLET ORAL at 20:21

## 2023-03-04 RX ADMIN — ACETAMINOPHEN 650 MG: 325 TABLET ORAL at 12:02

## 2023-03-04 RX ADMIN — DEXTROSE MONOHYDRATE: 50 INJECTION, SOLUTION INTRAVENOUS at 23:11

## 2023-03-04 RX ADMIN — MONTELUKAST 10 MG: 10 TABLET, FILM COATED ORAL at 08:55

## 2023-03-04 RX ADMIN — LOSARTAN POTASSIUM 50 MG: 25 TABLET, FILM COATED ORAL at 08:54

## 2023-03-04 RX ADMIN — DEXTROSE MONOHYDRATE: 50 INJECTION, SOLUTION INTRAVENOUS at 03:07

## 2023-03-04 RX ADMIN — ACETAMINOPHEN 650 MG: 325 TABLET ORAL at 04:30

## 2023-03-04 RX ADMIN — MOMETASONE FUROATE AND FORMOTEROL FUMARATE DIHYDRATE 2 PUFF: 200; 5 AEROSOL RESPIRATORY (INHALATION) at 08:10

## 2023-03-04 RX ADMIN — ONDANSETRON 4 MG: 2 INJECTION INTRAMUSCULAR; INTRAVENOUS at 23:45

## 2023-03-04 RX ADMIN — SEVELAMER CARBONATE 800 MG: 800 TABLET, FILM COATED ORAL at 08:55

## 2023-03-04 RX ADMIN — CINACALCET HYDROCHLORIDE 30 MG: 30 TABLET, FILM COATED ORAL at 08:55

## 2023-03-04 ASSESSMENT — PAIN SCALES - WONG BAKER
WONGBAKER_NUMERICALRESPONSE: 0
WONGBAKER_NUMERICALRESPONSE: 2
WONGBAKER_NUMERICALRESPONSE: 2

## 2023-03-04 ASSESSMENT — PAIN DESCRIPTION - LOCATION
LOCATION: SHOULDER
LOCATION: SHOULDER

## 2023-03-04 ASSESSMENT — PAIN DESCRIPTION - DESCRIPTORS
DESCRIPTORS: ACHING
DESCRIPTORS: ACHING

## 2023-03-04 ASSESSMENT — PAIN SCALES - GENERAL
PAINLEVEL_OUTOF10: 0
PAINLEVEL_OUTOF10: 0
PAINLEVEL_OUTOF10: 5
PAINLEVEL_OUTOF10: 0
PAINLEVEL_OUTOF10: 1
PAINLEVEL_OUTOF10: 1
PAINLEVEL_OUTOF10: 3
PAINLEVEL_OUTOF10: 0

## 2023-03-04 ASSESSMENT — PAIN DESCRIPTION - ORIENTATION
ORIENTATION: RIGHT
ORIENTATION: RIGHT

## 2023-03-04 ASSESSMENT — PAIN - FUNCTIONAL ASSESSMENT: PAIN_FUNCTIONAL_ASSESSMENT: ACTIVITIES ARE NOT PREVENTED

## 2023-03-04 NOTE — PROGRESS NOTES
0700 received care of pt lying in bed resting with eyes closed. Resp even and unlabored. 0830 shift assessments completed. VSS. Pt refusing breakfast, states \"I am just not hungry\". Morning meds provided without issue    1015 pt lying in bed resting with eyes closed. Call bell is within reach    1210 pt complaining of right shoulder pain. Tylenol provided. Pt not eating lunch, states \"I am not hungry\". Offered to call the kitchen if she has any special requests, but she states \"I don't want anything\". 1310 pt daughter at bedside. Encouraged PO intake. Update provided to family. 1600 IV zofran administered for complaint of nausea    1630 pt states nausea has resolved    1700 pt refuses dinner tray.  States nausea is gone and she is worried she will feel sick again

## 2023-03-04 NOTE — FLOWSHEET NOTE
03/04/23 0711   Handoff   Communication Given Shift Handoff   Handoff Given To Juliette Delacruz LPN   Handoff Received From YESENIA Velasquez RN   Handoff Communication Face to Face   Time Handoff Given 0397   End of Shift Check Performed Yes

## 2023-03-04 NOTE — PROGRESS NOTES
INTERNAL MEDICINE PROGRESS NOTE      Patient: Andria Mcginins   YOB: 1955   MRN: [id number]      Hospital course / Assessment and Plans   Principle Problems:  Acute Encephalopathy - resolved   - secondary to on compliance with with medications and HD , she missed last dialysis   -CT head no acute process  -improved with HD  Atrial Fibrillation:  - secondary to the patient not taking her medication due uremic anorexia and vomiting  -transferred to ICU for closer monitor, starting IV Amiodarone  - Converted to NSR, transition to PO after HD and able to take PO  Intractable nausea, vomit, abdominal cramps:  - 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 ESRD  -HD on MW  -nephrologist for HD   Hypertension:  -holding oral medications for now due to mental status  -PRN Hydralazine   Depression:  -not wanting to eat, or drink, or get oob,  Non-suicidal., may related to uremic syndrome  -recommend pcp referral for psyche eval at d/c.    Hypoglycemia:  -started on low rate of D5  -monitor glucose closely  Hypothyroidism  -current TSH 13.90, increase from most recent TSH of 12.52  -will increase Synthroid to 100 mcg at this time  NON-COMPLIANCE   - with medications and HD     Full Code   DVT prophylaxis: pharmacologic and mechanical    Disposition    Disposition: HOME     Subjective / ROS:   Patient states she is ok , still having nausea       Medical Decision Making   Chart, Images and Lab data reviewed, necessary medical Orders placed   Discussed with nursing staff     Vitals:    23 0327 23 0346 23 0425 23 0746   BP:   (!) 144/59 (!) 109/59   Pulse: 88 88 89 79   Resp:   12 16   Temp:   (!) 101.6 °F (38.7 °C) 98.5 °F (36.9 °C)   TempSrc:   Axillary Oral   SpO2: 95% 95% 97%    Weight:   142 lb (64.4 kg)    Height:         Temp (24hrs), Av.5 °F (37.5 °C), Min:98.3 °F (36.8 °C), Max:101.6 °F (38.7 °C)      Intake/Output Summary (Last 24 hours) at 3/4/2023 0935  Last data filed at 3/4/2023 0608  Gross per 24 hour   Intake 1786 ml   Output 2485 ml   Net -699 ml         Physical Exam:   General Appearance:   Appears in no acute distress.,  HEENT:   Moist oral mucous membranes, conjunctiva clear,   Neck:   Supple  Lungs: No wheezes. , No rales. , Normal respiratory effort,   Heart:   Regular rate and rhythm  Abdomen:   Soft , Non-distended and Non-tender,   Extremities:   no edema of legs  Neuro:   alert, oriented, moves all extremities well    Current medications:     Current Facility-Administered Medications   Medication Dose Route Frequency    amiodarone (CORDARONE) tablet 200 mg  200 mg Oral Daily    apixaban (ELIQUIS) tablet 2.5 mg  2.5 mg Oral BID    amiodarone (CORDARONE) 450 mg in dextrose 5 % 250 mL infusion  0.5 mg/min IntraVENous Continuous    acetaminophen (TYLENOL) tablet 650 mg  650 mg Oral Q6H PRN    acetaminophen (TYLENOL) suppository 650 mg  650 mg Rectal Q6H PRN    dextrose 5 % solution   IntraVENous Continuous    levothyroxine (SYNTHROID) tablet 100 mcg  100 mcg Oral QAM AC    albuterol sulfate HFA (PROVENTIL;VENTOLIN;PROAIR) 108 (90 Base) MCG/ACT inhaler 1 puff  1 puff Inhalation Q4H PRN    aluminum & magnesium hydroxide-simethicone (MAALOX) 200-200-20 MG/5ML suspension 10 mL  10 mL Oral Q6H PRN    carvedilol (COREG) tablet 25 mg  25 mg Oral BID WC    cinacalcet (SENSIPAR) tablet 30 mg  30 mg Oral Daily    pantoprazole (PROTONIX) tablet 40 mg  40 mg Oral QAM AC    losartan (COZAAR) tablet 50 mg  50 mg Oral Daily    montelukast (SINGULAIR) tablet 10 mg  10 mg Oral Daily    sevelamer (RENVELA) tablet 800 mg  800 mg Oral TID    glucose chewable tablet 16 g  4 tablet Oral PRN    dextrose bolus 10% 125 mL  125 mL IntraVENous PRN    Or    dextrose bolus 10% 250 mL  250 mL IntraVENous PRN    glucagon injection 1 mg  1 mg SubCUTAneous PRN    dextrose 10 % infusion   IntraVENous Continuous PRN    ondansetron (ZOFRAN) injection 4 mg  4 mg IntraVENous Q6H PRN    ondansetron (ZOFRAN) injection 4 mg  4 mg IntraVENous Q6H PRN    mometasone-formoterol (DULERA) 200-5 MCG/ACT inhaler 2 puff  2 puff Inhalation BID          Laboratory and Radiology Data :    Last 3 CK, CKMB, Troponin: No results for input(s): CKTOTAL, CKMB, TROPONINI in the last 72 hours.      Recent Results (from the past 24 hour(s))   POCT Glucose    Collection Time: 03/03/23 12:45 PM   Result Value Ref Range    POC Glucose 158 (H) 70 - 110 mg/dL    Performed by: Anurag Braun    Transthoracic echocardiogram (TTE) complete with contrast, bubble, strain, and 3D PRN    Collection Time: 03/03/23  1:35 PM   Result Value Ref Range    LV EDV A2C 65 mL    LV EDV A4C 50 mL    LV ESV A2C 26 mL    LV ESV A4C 19 mL    IVSd 1.5 (A) 0.6 - 0.9 cm    LVIDd 4.4 3.9 - 5.3 cm    LVIDs 3.1 cm    LVOT Diameter 2.0 cm    LVOT Mean Gradient 4 mmHg    LVOT VTI 26.8 cm    LVOT Peak Velocity 1.3 m/s    LVOT Peak Gradient 7 mmHg    LVPWd 1.3 (A) 0.6 - 0.9 cm    LV E' Lateral Velocity 12 cm/s    LV E' Septal Velocity 6 cm/s    LV Ejection Fraction A2C 61 %    LV Ejection Fraction A4C 63 %    EF BP 63 55 - 100 %    LVOT Area 3.1 cm2    LVOT SV 84.2 ml    LA Minor Axis 5.8 cm    LA Major Axis 6.3 cm    LA Area 2C 26.4 cm2    LA Area 4C 23.2 cm2    LA Volume 2C 100 (A) 22 - 52 mL    LA Volume 4C 69 (A) 22 - 52 mL    LA Volume BP 86 (A) 22 - 52 mL    LA Diameter 3.7 cm    AV Mean Gradient 5 mmHg    AV VTI 29.3 cm    AV Mean Velocity 1.0 m/s    AV Peak Velocity 1.6 m/s    AV Peak Gradient 10 mmHg    AV Area by VTI 2.9 cm2    AV Area by Peak Velocity 2.5 cm2    Aortic Root 3.5 cm    Ascending Aorta 3.3 cm    IVC Proxmal 1.9 cm    MV E Wave Deceleration Time 237.0 ms    MV A Velocity 0.68 m/s    MV E Velocity 0.74 m/s    MV Mean Gradient 2 mmHg    MV VTI 28.2 cm    MV Mean Velocity 0.7 m/s    MV Max Velocity 0.9 m/s    MV Peak Gradient 3 mmHg    MV Area by VTI 3.0 cm2    PV Max Velocity 1.2 m/s    PV Peak Gradient 5 mmHg    Est. RA Pressure 3 mmHg    RV Basal Dimension 4.7 cm    RV Longitudinal Dimension 7.6 cm    RV Mid Dimension 3.5 cm    RV GLS -22.0 %    TAPSE 1.9 1.7 cm    TR Max Velocity 2.55 m/s    TR Peak Gradient 26 mmHg    Body Surface Area 1.75 m2    Fractional Shortening 2D 30 28 - 44 %    LV ESV Index A4C 11 mL/m2    LV EDV Index A4C 29 mL/m2    LV ESV Index A2C 15 mL/m2    LV EDV Index A2C 38 mL/m2    LVIDd Index 2.59 cm/m2    LVIDs Index 1.82 cm/m2    LV RWT Ratio 0.59     LV Mass 2D 240.3 (A) 67 - 162 g    LV Mass 2D Index 141.3 (A) 43 - 95 g/m2    MV E/A 1.09     E/E' Ratio (Averaged) 9.25     E/E' Lateral 6.17     E/E' Septal 12.33     LA Volume Index BP 51 (A) 16 - 34 ml/m2    LVOT Stroke Volume Index 49.5 mL/m2    LA Volume Index 2C 59 (A) 16 - 34 mL/m2    LA Volume Index 4C 41 (A) 16 - 34 mL/m2    LA Size Index 2.18 cm/m2    LA/AO Root Ratio 1.06     Ao Root Index 2.06 cm/m2    Ascending Aorta Index 1.94 cm/m2    AV Velocity Ratio 0.81     LVOT:AV VTI Index 0.91     LORETTA/BSA VTI 1.7 cm2/m2    LORETTA/BSA Peak Velocity 1.5 cm2/m2    MV:LVOT VTI Index 1.05     RVSP 29 mmHg    RA Area 4C 16.6 cm2   POCT Glucose    Collection Time: 03/03/23  4:27 PM   Result Value Ref Range    POC Glucose 88 70 - 110 mg/dL    Performed by: Nia Lara    POCT Glucose    Collection Time: 03/03/23  8:28 PM   Result Value Ref Range    POC Glucose 81 70 - 110 mg/dL    Performed by: Ron Gonzalez    POCT Glucose    Collection Time: 03/03/23 11:54 PM   Result Value Ref Range    POC Glucose 77 70 - 110 mg/dL    Performed by:  Lisa Cardoza    Basic Metabolic Panel    Collection Time: 03/04/23  4:30 AM   Result Value Ref Range    Sodium 136 136 - 145 mmol/L    Potassium 3.8 3.5 - 5.5 mmol/L    Chloride 96 (L) 100 - 111 mmol/L    CO2 29 21 - 32 mmol/L    Anion Gap 11 3.0 - 18.0 mmol/L    Glucose 88 74 - 99 mg/dL    BUN 10 7 - 18 mg/dL    Creatinine 4.45 (H) 0.60 - 1.30 mg/dL    Bun/Cre Ratio 2 (L) 12 - 20      Est, Glom Filt Rate 10 (L) >60 ml/min/1.73m2    Calcium 8.1 (L) 8.5 - 10.1 mg/dL   POCT Glucose    Collection Time: 03/04/23  5:06 AM   Result Value Ref Range    POC Glucose 87 70 - 110 mg/dL    Performed by: Ron Gonzalez    POCT Glucose    Collection Time: 03/04/23  7:44 AM   Result Value Ref Range    POC Glucose 91 70 - 110 mg/dL    Performed by: Maria A Abdalla        Results       Procedure Component Value Units Date/Time    Rapid influenza A/B antigens [[id number]]     Order Status: Canceled Specimen: Nasopharyngeal     Rapid influenza A/B antigens [[id number]] Collected: 02/26/23 1528    Order Status: Completed Specimen: Nasal Washing Updated: 02/26/23 1607     Influenza A Ag Negative        Influenza B Ag Negative                Imaging results impression onlyCT ABDOMEN PELVIS WO CONTRAST Additional Contrast? None    Result Date: 2/26/2023  1. No acute abnormality    CT HEAD WO CONTRAST    Result Date: 3/2/2023  No acute intracranial abnormality. XR CHEST PORTABLE    Result Date: 3/1/2023  No retained foreign body or pneumothorax Mild interstitial edema The mediastinum is not optimally evaluated on portable radiography    XR CHEST PORTABLE    Result Date: 3/1/2023  No acute process. XR CHEST PORTABLE    Result Date: 2/26/2023  1. Decreased central pulmonary vascular congestion      CT HEAD WO CONTRAST   Final Result   No acute intracranial abnormality. XR CHEST PORTABLE   Final Result      No retained foreign body or pneumothorax   Mild interstitial edema   The mediastinum is not optimally evaluated on portable radiography      XR CHEST PORTABLE   Final Result   No acute process.                       Matilda Block M.D.

## 2023-03-04 NOTE — PLAN OF CARE
Problem: Discharge Planning  Goal: Discharge to home or other facility with appropriate resources  3/3/2023 2120 by Bhaskar Isbell RN  Outcome: Progressing  3/3/2023 2120 by Bhaskar Isbell RN  Outcome: Progressing  3/3/2023 2120 by Bhaskar Isbell RN  Outcome: Progressing  Flowsheets (Taken 3/3/2023 0800 by Rosie Lee RN)  Discharge to home or other facility with appropriate resources:   Identify barriers to discharge with patient and caregiver   Arrange for needed discharge resources and transportation as appropriate   Identify discharge learning needs (meds, wound care, etc)   Refer to discharge planning if patient needs post-hospital services based on physician order or complex needs related to functional status, cognitive ability or social support system

## 2023-03-04 NOTE — PLAN OF CARE
Problem: Nutrition Deficit:  Goal: Optimize nutritional status  Flowsheets (Taken 3/3/2023 2121)  Nutrient intake appropriate for improving, restoring, or maintaining nutritional needs:   Monitor oral intake, labs, and treatment plans   Recommend appropriate diets, oral nutritional supplements, and vitamin/mineral supplements   Recommend, monitor, and adjust tube feedings and TPN/PPN based on assessed needs   Comprehensive Nutrition Assessment    Type and Reason for Visit:  Initial    Nutrition Recommendations/Plan:   Liberalize diet to regular until intake improves  Nepro TID  (420 Kcal 19 gm Protein each if consumed)  Consider EN if no improvement in intake with supplementation     Malnutrition Assessment:  Malnutrition Status:  Severe malnutrition (03/03/23 2120)    Context:      chronic condition  Findings of the 6 clinical characteristics of malnutrition:  Energy Intake:  75% or less estimated energy requirements for 1 month or longer  Weight Loss:  No significant weight loss     Body Fat Loss:  Mild body fat loss Triceps   Muscle Mass Loss:  Severe muscle mass loss Temples (temporalis), Clavicles (pectoralis & deltoids), Hand (interosseous)  Fluid Accumulation:  Severe Generalized   Strength:  Not Performed    Nutrition Assessment:    Pt admitted with hypoglycemia, anorexia, fluid overload due to missed dialysis, depression, uremic syndrome. PMHx atrial fibrillation, hypothyroidism, CKD on HD tid. Pt states she has not eaten solid food for 2 weeks. Nutrition Related Findings:    Na 132-, cl 95 with poor intake less than 1000 mg/d . Glucose 103 on IVF D5. Wound Type: None       Current Nutrition Intake & Therapies:    Average Meal Intake: 0%  Average Supplements Intake: None Ordered  ADULT DIET;  Regular; 3 carb choices (45 gm/meal)  ADULT ORAL NUTRITION SUPPLEMENT; Breakfast, Lunch, Dinner; Renal Oral Supplement    Anthropometric Measures:  Height: 5' 1\" (154.9 cm)  Ideal Body Weight (IBW): 105 lbs (48 kg)    Admission Body Weight: 156 lb 1.4 oz (70.8 kg)  Current Body Weight: 156 lb 1.4 oz (70.8 kg), 148.7 % IBW. Weight Source: Bed Scale  Current BMI (kg/m2): 29.5  Usual Body Weight: 150 lb (68 kg)  % Weight Change (Calculated): 4.1  Weight Adjustment For: No Adjustment     BMI Categories: Overweight (BMI 25.0-29. 9)    Estimated Daily Nutrient Needs:  Energy Requirements Based On: Kcal/kg     Energy (kcal/day): 1308-0496 Kcal/d (25-30 kcal/kg)     Protein (g/day): 65-78 gm/d (1.0-1.2 gm/kg)     Fluid (ml/day): 1500 ml/d    Nutrition Diagnosis: In context of chronic illness, Severe malnutrition related to endocrine dysfuntion, renal dysfunction, early satiety, psychological cause or life stress as evidenced by Criteria as identified in malnutrition assessment, intake 0-25%, vomiting, poor dentition, nausea    Nutrition Interventions:   Food and/or Nutrient Delivery: Modify Current Diet, Start Oral Nutrition Supplement  Nutrition Education/Counseling: Education declined  Coordination of Nutrition Care: Continue to monitor while inpatient  Plan of Care discussed with: patient, provider, nurse    Goals:     Goals: PO intake 50% or greater, by next RD assessment, other (specify)  Specify Other Goals: if no improvement with supplements , consider EN    Nutrition Monitoring and Evaluation:   Behavioral-Environmental Outcomes: Readiness for Change  Food/Nutrient Intake Outcomes: Food and Nutrient Intake, Supplement Intake  Physical Signs/Symptoms Outcomes: Biochemical Data, Nausea or Vomiting, Fluid Status or Edema, Nutrition Focused Physical Findings    Discharge Planning: Too soon to determine     Meredith Garcia RD.   Contact: [phone] or [employer]

## 2023-03-04 NOTE — PROGRESS NOTES
Bedside shift change report given to YESENIA Velasquez RN (oncoming nurse) by Ubaldo Sawyer RN (offgoing nurse). Report included the following information SBAR, Intake/Output, MAR, Recent Results, Med Rec Status, and Quality Measures. 2000  Dialysis completed. DN said e took off 2L and vital signs remained stable. Shift assessment completed. Pt under her covers due to being cold. Pt A&Ox4 but lethargic. Pt states she has been lethargic like this since she got her Covid booster and Shingles shot two weeks ago. Pupils PERRL.  and foot pushes weak. Lungs clear in all lobes, BS hypoactive in all quadrants. Pt states she is nauseous. Zofran will be given prior to PO medication administration. Pt is Oliguric. Brief dry. Amiodarone gtt infusing at 0.5mg/ml and D5 infusing at 25ml/hr in to a patent midline in R upper arm. Good blood return on both lumens. L fistula in left upper arm has good thrill and bruit. Dressing D/I.  VSS. Pt was on 2L O2 during dialysis. Pt assessed at RA and was 93%. O2 removed. Pt denies SOB or dyspnea. SR on telemetry. CBWR.     2020  PO Amiodarone administered. Pt tolerated well even though pt states she remains nauseous. Will administer other 2100 medications after pt has had time to absorb the Amiodarone. 2155  All other 2100 PO medications administered. Pt able to keep medication down. 2220  Pt able to keep PO Amiodarone down without vomiting. IV Amiodarone stopped per order. 2345  2400 VSS. Pt states she is not in pain and declined being repositioned at this time. She said she was comfortable. 0215  Pt lying in bed with eyes closed. Respirations equal and unlabored. No distress noted. CBWR.     0430  Blood drawn from Midline per protocol. Ax temp 101.6. Tylenol administered. Blankets pulled down. Pt given diet ginger ale but did not like it saying it made her feel nauseous. 0645  Bedside shift change report given to SAIGE Reyes LPN (oncoming nurse) by YESENIA Velasquez, RN (offgoing nurse). Report included the following information SBAR, Intake/Output, MAR, Recent Results, Med Rec Status, and Quality Measures.

## 2023-03-04 NOTE — FLOWSHEET NOTE
03/03/23 1900   Rhythm Interpretation   Heart Rate 78   Handoff   Communication Given Shift Handoff   Handoff Given To YESENIA Velasquez RN   Handoff Received From Joellen Vela RN   Handoff Communication Face to Face; At bedside   Time Handoff Given 1900   End of Shift Check Performed Yes

## 2023-03-05 LAB
ANION GAP SERPL CALC-SCNC: 10 MMOL/L (ref 3–18)
BUN SERPL-MCNC: 19 MG/DL (ref 7–18)
BUN/CREAT SERPL: 3 (ref 12–20)
CA-I BLD-MCNC: 7.8 MG/DL (ref 8.5–10.1)
CHLORIDE SERPL-SCNC: 90 MMOL/L (ref 100–111)
CO2 SERPL-SCNC: 29 MMOL/L (ref 21–32)
CREAT SERPL-MCNC: 6.06 MG/DL (ref 0.6–1.3)
GLUCOSE BLD STRIP.AUTO-MCNC: 100 MG/DL (ref 70–110)
GLUCOSE BLD STRIP.AUTO-MCNC: 103 MG/DL (ref 70–110)
GLUCOSE BLD STRIP.AUTO-MCNC: 82 MG/DL (ref 70–110)
GLUCOSE BLD STRIP.AUTO-MCNC: 87 MG/DL (ref 70–110)
GLUCOSE BLD STRIP.AUTO-MCNC: 90 MG/DL (ref 70–110)
GLUCOSE SERPL-MCNC: 80 MG/DL (ref 74–99)
PERFORMED BY:: NORMAL
POTASSIUM SERPL-SCNC: 4.1 MMOL/L (ref 3.5–5.5)
SODIUM SERPL-SCNC: 129 MMOL/L (ref 136–145)

## 2023-03-05 PROCEDURE — 94640 AIRWAY INHALATION TREATMENT: CPT

## 2023-03-05 PROCEDURE — 80048 BASIC METABOLIC PNL TOTAL CA: CPT

## 2023-03-05 PROCEDURE — 6360000002 HC RX W HCPCS: Performed by: INTERNAL MEDICINE

## 2023-03-05 PROCEDURE — 6370000000 HC RX 637 (ALT 250 FOR IP): Performed by: INTERNAL MEDICINE

## 2023-03-05 PROCEDURE — 94761 N-INVAS EAR/PLS OXIMETRY MLT: CPT

## 2023-03-05 PROCEDURE — 6370000000 HC RX 637 (ALT 250 FOR IP): Performed by: NURSE PRACTITIONER

## 2023-03-05 PROCEDURE — 1100000000 HC RM PRIVATE

## 2023-03-05 PROCEDURE — 36415 COLL VENOUS BLD VENIPUNCTURE: CPT

## 2023-03-05 PROCEDURE — 82962 GLUCOSE BLOOD TEST: CPT

## 2023-03-05 PROCEDURE — 2580000003 HC RX 258: Performed by: NURSE PRACTITIONER

## 2023-03-05 PROCEDURE — 6360000002 HC RX W HCPCS: Performed by: NURSE PRACTITIONER

## 2023-03-05 RX ORDER — PROCHLORPERAZINE EDISYLATE 5 MG/ML
10 INJECTION INTRAMUSCULAR; INTRAVENOUS EVERY 6 HOURS PRN
Status: DISCONTINUED | OUTPATIENT
Start: 2023-03-05 | End: 2023-03-09 | Stop reason: HOSPADM

## 2023-03-05 RX ORDER — LIDOCAINE 4 G/G
1 PATCH TOPICAL DAILY
Status: DISCONTINUED | OUTPATIENT
Start: 2023-03-05 | End: 2023-03-09 | Stop reason: HOSPADM

## 2023-03-05 RX ADMIN — CARVEDILOL 25 MG: 12.5 TABLET, FILM COATED ORAL at 16:35

## 2023-03-05 RX ADMIN — SEVELAMER CARBONATE 800 MG: 800 TABLET, FILM COATED ORAL at 14:23

## 2023-03-05 RX ADMIN — LEVOTHYROXINE SODIUM 100 MCG: 0.1 TABLET ORAL at 06:15

## 2023-03-05 RX ADMIN — CARVEDILOL 25 MG: 12.5 TABLET, FILM COATED ORAL at 08:13

## 2023-03-05 RX ADMIN — PROCHLORPERAZINE EDISYLATE 10 MG: 5 INJECTION INTRAMUSCULAR; INTRAVENOUS at 20:13

## 2023-03-05 RX ADMIN — APIXABAN 2.5 MG: 2.5 TABLET, FILM COATED ORAL at 08:13

## 2023-03-05 RX ADMIN — ONDANSETRON 4 MG: 2 INJECTION INTRAMUSCULAR; INTRAVENOUS at 11:46

## 2023-03-05 RX ADMIN — ALUMINA, MAGNESIA, AND SIMETHICONE ORAL SUSPENSION REGULAR STRENGTH 10 ML: 1200; 1200; 120 SUSPENSION ORAL at 17:22

## 2023-03-05 RX ADMIN — APIXABAN 2.5 MG: 2.5 TABLET, FILM COATED ORAL at 21:05

## 2023-03-05 RX ADMIN — SEVELAMER CARBONATE 800 MG: 800 TABLET, FILM COATED ORAL at 20:13

## 2023-03-05 RX ADMIN — PANTOPRAZOLE SODIUM 40 MG: 40 TABLET, DELAYED RELEASE ORAL at 06:15

## 2023-03-05 RX ADMIN — MOMETASONE FUROATE AND FORMOTEROL FUMARATE DIHYDRATE 2 PUFF: 200; 5 AEROSOL RESPIRATORY (INHALATION) at 19:52

## 2023-03-05 RX ADMIN — LOSARTAN POTASSIUM 50 MG: 25 TABLET, FILM COATED ORAL at 08:13

## 2023-03-05 RX ADMIN — SEVELAMER CARBONATE 800 MG: 800 TABLET, FILM COATED ORAL at 08:15

## 2023-03-05 RX ADMIN — ACETAMINOPHEN 650 MG: 325 TABLET ORAL at 08:13

## 2023-03-05 RX ADMIN — CINACALCET HYDROCHLORIDE 30 MG: 30 TABLET, FILM COATED ORAL at 08:13

## 2023-03-05 RX ADMIN — MOMETASONE FUROATE AND FORMOTEROL FUMARATE DIHYDRATE 2 PUFF: 200; 5 AEROSOL RESPIRATORY (INHALATION) at 07:18

## 2023-03-05 RX ADMIN — DEXTROSE MONOHYDRATE: 50 INJECTION, SOLUTION INTRAVENOUS at 18:31

## 2023-03-05 RX ADMIN — AMIODARONE HYDROCHLORIDE 200 MG: 200 TABLET ORAL at 08:13

## 2023-03-05 RX ADMIN — MONTELUKAST 10 MG: 10 TABLET, FILM COATED ORAL at 08:13

## 2023-03-05 NOTE — PROGRESS NOTES
0700- Receive bedside report from off going nurse. 3976- Assessment completed. No complaints at this time. Mild fever of 100.6.     0745- Pt refused breakfast.     0805- Blood glucose is 87.    0813- Meds given. Pt tolerated well. PRN tylenol given for mild fever. 8620- Pt called stating she was \"burning up. \" Temp recheck is 99.6. Thermostat turned down from 80 to 70. Cool rag given. 1146- Pt called asking for Zofran, Zofran given. Lidocaine patch removed. IV in foot removed. Blood glucose 90. VSS.    1215- Pt called stating she was hot gain. Temp 98.6. States she is not nauseous just feels weird. Cool rag given. NP made aware. Pt drank half of a nutritional supplement. 1245- NP in to see pt.     1423- Meds given per MAR.    1610- VSS. Blood Glucose 100.     1640- Coreg given per MAR. Asked patient if she wanted to be changed, she is [place] for now. \"    4292- Pt complaining of indigestion. PRN Maalox given. 1819- Pt satisfied with Maalox. Resting quietly.

## 2023-03-05 NOTE — PROGRESS NOTES
INTERNAL MEDICINE PROGRESS NOTE      Patient: Radha Stacy   YOB: 1955   MRN: [id number]      Hospital course / Assessment and Plans   Principle Problems:  Acute Encephalopathy - resolved   - secondary to on compliance with with medications and HD , she missed last dialysis , TSH 13.9   -CT head no acute process  -improved with HD and resuming synthroid   Atrial Fibrillation:  - secondary to the patient not taking her medication due uremic anorexia and vomiting  -transferred to ICU for closer monitor, starting IV Amiodarone  - Converted to NSR, transition to PO after HD and her vomiting improved   Intractable nausea, vomit, abdominal cramps  - 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 ESRD  -HD on MW  -nephrologist for HD   Hypertension:  -holding oral medications for now due to mental status  -PRN Hydralazine   Depression:  -not wanting to eat, or drink, or get oob,  Non-suicidal., may related to uremic syndrome  -recommend pcp referral for psyche eval at d/c.    Hypoglycemia:  -started on low rate of D5  -monitor glucose closely  Hypothyroidism  -current TSH 13.90, increase from most recent TSH of 12.52  -will increase Synthroid to 100 mcg at this time  NON-COMPLIANCE   - with medications and HD - educated and counseled     Full Code   DVT prophylaxis: pharmacologic and mechanical    Disposition    Disposition: HOME Monday Am after HD     Subjective / ROS:   Patient states she feel better, nausea/vomiting improved       Medical Decision Making   Chart, Images and Lab data reviewed, necessary medical Orders placed   Discussed with nursing staff     Vitals:    03/05/23 0354 03/05/23 0600 03/05/23 0718 03/05/23 0727   BP: (!) 153/60   (!) 163/63   Pulse: 76  79 78   Resp: 18  19 20   Temp: 99.2 °F (37.3 °C)   (!) 100.6 °F (38.1 °C)   TempSrc: Axillary   Oral   SpO2: 98%  99% 98%   Weight: 139 lb 1.6 oz (63.1 kg) 138 lb (62.6 kg)     Height:         Temp (24hrs), Av.1 °F (37.3 °C), Min:98.4 °F (36.9 °C), Max:100.6 °F (38.1 °C)    No intake or output data in the 24 hours ending 23 0741        Physical Exam:   General Appearance:   Appears in no acute distress.,  HEENT:   Moist oral mucous membranes, conjunctiva clear,   Neck:   Supple  Lungs: No wheezes. , No rales. , Normal respiratory effort,   Heart:   Regular rate and rhythm  Abdomen:   Soft , Non-distended and Non-tender,   Extremities:   + edema of legs  Neuro:   alert, oriented, moves all extremities well      Current medications:     Current Facility-Administered Medications   Medication Dose Route Frequency    lidocaine 4 % external patch 1 patch  1 patch TransDERmal Daily    amiodarone (CORDARONE) tablet 200 mg  200 mg Oral Daily    apixaban (ELIQUIS) tablet 2.5 mg  2.5 mg Oral BID    amiodarone (CORDARONE) 450 mg in dextrose 5 % 250 mL infusion  0.5 mg/min IntraVENous Continuous    acetaminophen (TYLENOL) tablet 650 mg  650 mg Oral Q6H PRN    acetaminophen (TYLENOL) suppository 650 mg  650 mg Rectal Q6H PRN    dextrose 5 % solution   IntraVENous Continuous    levothyroxine (SYNTHROID) tablet 100 mcg  100 mcg Oral QAM AC    albuterol sulfate HFA (PROVENTIL;VENTOLIN;PROAIR) 108 (90 Base) MCG/ACT inhaler 1 puff  1 puff Inhalation Q4H PRN    aluminum & magnesium hydroxide-simethicone (MAALOX) 200-200-20 MG/5ML suspension 10 mL  10 mL Oral Q6H PRN    carvedilol (COREG) tablet 25 mg  25 mg Oral BID WC    cinacalcet (SENSIPAR) tablet 30 mg  30 mg Oral Daily    pantoprazole (PROTONIX) tablet 40 mg  40 mg Oral QAM AC    losartan (COZAAR) tablet 50 mg  50 mg Oral Daily    montelukast (SINGULAIR) tablet 10 mg  10 mg Oral Daily    sevelamer (RENVELA) tablet 800 mg  800 mg Oral TID    glucose chewable tablet 16 g  4 tablet Oral PRN    dextrose bolus 10% 125 mL  125 mL IntraVENous PRN    Or    dextrose bolus 10% 250 mL  250 mL IntraVENous PRN    glucagon injection 1 mg  1 mg SubCUTAneous PRN    dextrose 10 % infusion   IntraVENous Continuous PRN    ondansetron (ZOFRAN) injection 4 mg  4 mg IntraVENous Q6H PRN    ondansetron (ZOFRAN) injection 4 mg  4 mg IntraVENous Q6H PRN    mometasone-formoterol (DULERA) 200-5 MCG/ACT inhaler 2 puff  2 puff Inhalation BID          Laboratory and Radiology Data :    Last 3 CK, CKMB, Troponin: No results for input(s): CKTOTAL, CKMB, TROPONINI in the last 72 hours. Recent Results (from the past 24 hour(s))   POCT Glucose    Collection Time: 03/04/23  7:44 AM   Result Value Ref Range    POC Glucose 91 70 - 110 mg/dL    Performed by: Samantha Barksdale    POCT Glucose    Collection Time: 03/04/23 11:48 AM   Result Value Ref Range    POC Glucose 89 70 - 110 mg/dL    Performed by: Samantha Barksdale    POCT Glucose    Collection Time: 03/04/23  2:17 PM   Result Value Ref Range    POC Glucose 104 70 - 110 mg/dL    Performed by: Samantha Barksdale    POCT Glucose    Collection Time: 03/04/23  8:03 PM   Result Value Ref Range    POC Glucose 86 70 - 110 mg/dL    Performed by: Ron Gonzalez    POCT Glucose    Collection Time: 03/04/23 11:36 PM   Result Value Ref Range    POC Glucose 86 70 - 110 mg/dL    Performed by: Ron Gonzalez    POCT Glucose    Collection Time: 03/05/23  3:48 AM   Result Value Ref Range    POC Glucose 82 70 - 110 mg/dL    Performed by:  Ayana Merlos    Basic Metabolic Panel    Collection Time: 03/05/23  4:15 AM   Result Value Ref Range    Sodium 129 (L) 136 - 145 mmol/L    Potassium 4.1 3.5 - 5.5 mmol/L    Chloride 90 (L) 100 - 111 mmol/L    CO2 29 21 - 32 mmol/L    Anion Gap 10 3.0 - 18.0 mmol/L    Glucose 80 74 - 99 mg/dL    BUN 19 (H) 7 - 18 mg/dL    Creatinine 6.06 (H) 0.60 - 1.30 mg/dL    Bun/Cre Ratio 3 (L) 12 - 20      Est, Glom Filt Rate 7 (L) >60 ml/min/1.73m2    Calcium 7.8 (L) 8.5 - 10.1 mg/dL       Results       Procedure Component Value Units Date/Time    Rapid influenza A/B antigens [[id number]]     Order Status: Canceled Specimen: Nasopharyngeal     Rapid influenza A/B antigens [[id number]] Collected: 02/26/23 1528    Order Status: Completed Specimen: Nasal Washing Updated: 02/26/23 1607     Influenza A Ag Negative        Influenza B Ag Negative                Imaging results impression onlyCT ABDOMEN PELVIS WO CONTRAST Additional Contrast? None    Result Date: 2/26/2023  1. No acute abnormality    CT HEAD WO CONTRAST    Result Date: 3/2/2023  No acute intracranial abnormality. XR CHEST PORTABLE    Result Date: 3/1/2023  No retained foreign body or pneumothorax Mild interstitial edema The mediastinum is not optimally evaluated on portable radiography    XR CHEST PORTABLE    Result Date: 3/1/2023  No acute process. XR CHEST PORTABLE    Result Date: 2/26/2023  1. Decreased central pulmonary vascular congestion      CT HEAD WO CONTRAST   Final Result   No acute intracranial abnormality. XR CHEST PORTABLE   Final Result      No retained foreign body or pneumothorax   Mild interstitial edema   The mediastinum is not optimally evaluated on portable radiography      XR CHEST PORTABLE   Final Result   No acute process.                       Nirmala Quinn M.D.

## 2023-03-05 NOTE — PROGRESS NOTES
Bedside shift change report given to YESENIA Velasquez RN (oncoming nurse) by Umer Melendez LPN (offgoing nurse). Report included the following information SBAR, Intake/Output, MAR, Recent Results, Med Rec Status, and Quality Measures. 1930  Shift assessment completed. Pt A&Ox4. VSS. SR on telemetry. Pt C/O nausea. Last Zofran administration was 1601. Advised pt writer would be back at 2200 to reassess nausea for potential treatment. Pt also requesting Tylenol for R shoulder pain. Will administer shortly. Lungs clear and BS present. Pt has a flat affect. CBWR.     2150  Pt asking for a breathing tx due to \"tightness\". RT called and advised. 2210  RT administered pts MDI. 2340  2400 VSS. SR on telemetry. Pt states her R shoulder pain is now above a 10 and \"hurting\". Provider is involved with a [employer] at another location. Will advise as soon as they are available. 36  Spoke with provider and advised of pts pain. New order received for a Lidocaine patch. 0055  4% Lidocaine patch placed on pts R arm/shoulder for 10/10 pain. 0200  Pt lying in bed with eyes closed. Respirations equal and unlabored. 0400  Blood drawn from midline without difficulty for AM labs. Pt denies needs at this time. CBWR.    0645  Bedside shift change report given to DANA Peralta LPN (oncoming nurse) by YESENIA Velasquez RN (offgoing nurse). Report included the following information SBAR, Intake/Output, MAR, Recent Results, Med Rec Status, and Quality Measures.

## 2023-03-06 ENCOUNTER — APPOINTMENT (OUTPATIENT)
Age: 68
DRG: 682 | End: 2023-03-06
Payer: MEDICARE

## 2023-03-06 PROBLEM — E83.42 HYPOMAGNESEMIA: Status: ACTIVE | Noted: 2023-03-06

## 2023-03-06 PROBLEM — I47.29 POLYMORPHIC VENTRICULAR TACHYCARDIA (HCC): Status: ACTIVE | Noted: 2023-03-06

## 2023-03-06 LAB
ANION GAP SERPL CALC-SCNC: 12 MMOL/L (ref 3–18)
ANION GAP SERPL CALC-SCNC: 9 MMOL/L (ref 3–18)
ARTERIAL PATENCY WRIST A: YES
BASE EXCESS BLDA CALC-SCNC: 4.9 MMOL/L (ref 0–3)
BDY SITE: ABNORMAL
BUN SERPL-MCNC: 14 MG/DL (ref 7–18)
BUN SERPL-MCNC: 29 MG/DL (ref 7–18)
BUN/CREAT SERPL: 4 (ref 12–20)
BUN/CREAT SERPL: 4 (ref 12–20)
CA-I BLD-MCNC: 7.6 MG/DL (ref 8.5–10.1)
CA-I BLD-MCNC: 8.8 MG/DL (ref 8.5–10.1)
CHLORIDE SERPL-SCNC: 86 MMOL/L (ref 100–111)
CHLORIDE SERPL-SCNC: 96 MMOL/L (ref 100–111)
CO2 SERPL-SCNC: 26 MMOL/L (ref 21–32)
CO2 SERPL-SCNC: 29 MMOL/L (ref 21–32)
COHGB MFR BLD: 0.5 % (ref 1–2)
CREAT SERPL-MCNC: 3.74 MG/DL (ref 0.6–1.3)
CREAT SERPL-MCNC: 7.36 MG/DL (ref 0.6–1.3)
EKG ATRIAL RATE: 62 BPM
EKG DIAGNOSIS: NORMAL
EKG P AXIS: 57 DEGREES
EKG P-R INTERVAL: 222 MS
EKG Q-T INTERVAL: 644 MS
EKG QRS DURATION: 96 MS
EKG QTC CALCULATION (BAZETT): 653 MS
EKG R AXIS: 48 DEGREES
EKG T AXIS: 87 DEGREES
EKG VENTRICULAR RATE: 62 BPM
FIO2 ON VENT: 30 %
GLUCOSE BLD STRIP.AUTO-MCNC: 109 MG/DL (ref 70–110)
GLUCOSE BLD STRIP.AUTO-MCNC: 86 MG/DL (ref 70–110)
GLUCOSE BLD STRIP.AUTO-MCNC: 90 MG/DL (ref 70–110)
GLUCOSE BLD STRIP.AUTO-MCNC: 90 MG/DL (ref 70–110)
GLUCOSE BLD STRIP.AUTO-MCNC: 97 MG/DL (ref 70–110)
GLUCOSE BLD STRIP.AUTO-MCNC: 99 MG/DL (ref 70–110)
GLUCOSE SERPL-MCNC: 104 MG/DL (ref 74–99)
GLUCOSE SERPL-MCNC: 94 MG/DL (ref 74–99)
HCO3 BLDA-SCNC: 27 MMOL/L (ref 22–26)
MAGNESIUM SERPL-MCNC: 1.9 MG/DL (ref 1.6–2.6)
METHGB MFR BLD: 0.3 % (ref 0–1.4)
OXYHGB MFR BLD: 95.5 % (ref 95–99)
PCO2 BLDA: 29 MMHG (ref 35–45)
PEEP RESPIRATORY: 5
PERFORMED BY:: ABNORMAL
PERFORMED BY:: NORMAL
PH BLDA: 7.58 (ref 7.35–7.45)
PO2 BLDA: 79 MMHG (ref 80–100)
POTASSIUM SERPL-SCNC: 4 MMOL/L (ref 3.5–5.5)
POTASSIUM SERPL-SCNC: 4.2 MMOL/L (ref 3.5–5.5)
SAO2 % BLD: 96 % (ref 95–99)
SAO2% DEVICE SAO2% SENSOR NAME: ABNORMAL
SERVICE CMNT-IMP: ABNORMAL
SODIUM SERPL-SCNC: 124 MMOL/L (ref 136–145)
SODIUM SERPL-SCNC: 134 MMOL/L (ref 136–145)
SPECIMEN SITE: ABNORMAL
TROPONIN I SERPL HS-MCNC: 42 NG/L (ref 0–54)
VT SETTING VENT: 400

## 2023-03-06 PROCEDURE — 2500000003 HC RX 250 WO HCPCS: Performed by: NURSE PRACTITIONER

## 2023-03-06 PROCEDURE — 6370000000 HC RX 637 (ALT 250 FOR IP): Performed by: INTERNAL MEDICINE

## 2023-03-06 PROCEDURE — 86706 HEP B SURFACE ANTIBODY: CPT

## 2023-03-06 PROCEDURE — 6360000002 HC RX W HCPCS: Performed by: NURSE PRACTITIONER

## 2023-03-06 PROCEDURE — 94640 AIRWAY INHALATION TREATMENT: CPT

## 2023-03-06 PROCEDURE — 94002 VENT MGMT INPAT INIT DAY: CPT

## 2023-03-06 PROCEDURE — 2000000000 HC ICU R&B

## 2023-03-06 PROCEDURE — 36600 WITHDRAWAL OF ARTERIAL BLOOD: CPT

## 2023-03-06 PROCEDURE — 82803 BLOOD GASES ANY COMBINATION: CPT

## 2023-03-06 PROCEDURE — 02HV33Z INSERTION OF INFUSION DEVICE INTO SUPERIOR VENA CAVA, PERCUTANEOUS APPROACH: ICD-10-PCS | Performed by: INTERNAL MEDICINE

## 2023-03-06 PROCEDURE — 6370000000 HC RX 637 (ALT 250 FOR IP): Performed by: NURSE PRACTITIONER

## 2023-03-06 PROCEDURE — 1100000000 HC RM PRIVATE

## 2023-03-06 PROCEDURE — 36415 COLL VENOUS BLD VENIPUNCTURE: CPT

## 2023-03-06 PROCEDURE — 6360000002 HC RX W HCPCS

## 2023-03-06 PROCEDURE — 83735 ASSAY OF MAGNESIUM: CPT

## 2023-03-06 PROCEDURE — 87340 HEPATITIS B SURFACE AG IA: CPT

## 2023-03-06 PROCEDURE — 94761 N-INVAS EAR/PLS OXIMETRY MLT: CPT

## 2023-03-06 PROCEDURE — 93005 ELECTROCARDIOGRAM TRACING: CPT | Performed by: INTERNAL MEDICINE

## 2023-03-06 PROCEDURE — 84484 ASSAY OF TROPONIN QUANT: CPT

## 2023-03-06 PROCEDURE — 80048 BASIC METABOLIC PNL TOTAL CA: CPT

## 2023-03-06 PROCEDURE — 82962 GLUCOSE BLOOD TEST: CPT

## 2023-03-06 PROCEDURE — 90935 HEMODIALYSIS ONE EVALUATION: CPT

## 2023-03-06 PROCEDURE — 71045 X-RAY EXAM CHEST 1 VIEW: CPT

## 2023-03-06 RX ORDER — ALBUTEROL SULFATE 2.5 MG/3ML
2.5 SOLUTION RESPIRATORY (INHALATION) EVERY 4 HOURS PRN
Status: DISCONTINUED | OUTPATIENT
Start: 2023-03-06 | End: 2023-03-09 | Stop reason: HOSPADM

## 2023-03-06 RX ORDER — PROPOFOL 10 MG/ML
5-50 INJECTION, EMULSION INTRAVENOUS CONTINUOUS
Status: DISCONTINUED | OUTPATIENT
Start: 2023-03-06 | End: 2023-03-09 | Stop reason: HOSPADM

## 2023-03-06 RX ORDER — LIDOCAINE HYDROCHLORIDE ANHYDROUS AND DEXTROSE MONOHYDRATE 5; 400 G/100ML; MG/100ML
1 INJECTION, SOLUTION INTRAVENOUS CONTINUOUS
Status: DISCONTINUED | OUTPATIENT
Start: 2023-03-06 | End: 2023-03-09 | Stop reason: HOSPADM

## 2023-03-06 RX ORDER — ARFORMOTEROL TARTRATE 15 UG/2ML
15 SOLUTION RESPIRATORY (INHALATION) 2 TIMES DAILY
Status: DISCONTINUED | OUTPATIENT
Start: 2023-03-07 | End: 2023-03-07

## 2023-03-06 RX ORDER — ETOMIDATE 2 MG/ML
15 INJECTION INTRAVENOUS ONCE
Status: COMPLETED | OUTPATIENT
Start: 2023-03-06 | End: 2023-03-06

## 2023-03-06 RX ORDER — LIDOCAINE HYDROCHLORIDE 20 MG/ML
100 INJECTION, SOLUTION INTRAVENOUS ONCE
Status: DISCONTINUED | OUTPATIENT
Start: 2023-03-06 | End: 2023-03-06

## 2023-03-06 RX ORDER — SUCCINYLCHOLINE CHLORIDE 20 MG/ML
200 INJECTION INTRAMUSCULAR; INTRAVENOUS ONCE
Status: DISCONTINUED | OUTPATIENT
Start: 2023-03-06 | End: 2023-03-09 | Stop reason: HOSPADM

## 2023-03-06 RX ORDER — BUDESONIDE 0.5 MG/2ML
0.5 INHALANT ORAL 2 TIMES DAILY
Status: DISCONTINUED | OUTPATIENT
Start: 2023-03-07 | End: 2023-03-07

## 2023-03-06 RX ORDER — SUCCINYLCHOLINE/SOD CL,ISO/PF 100 MG/5ML
SYRINGE (ML) INTRAVENOUS
Status: COMPLETED
Start: 2023-03-06 | End: 2023-03-06

## 2023-03-06 RX ORDER — MAGNESIUM SULFATE IN WATER 40 MG/ML
2000 INJECTION, SOLUTION INTRAVENOUS ONCE
Status: COMPLETED | OUTPATIENT
Start: 2023-03-06 | End: 2023-03-06

## 2023-03-06 RX ORDER — ETOMIDATE 2 MG/ML
INJECTION INTRAVENOUS
Status: DISPENSED
Start: 2023-03-06 | End: 2023-03-07

## 2023-03-06 RX ADMIN — ACETAMINOPHEN 650 MG: 325 TABLET ORAL at 08:42

## 2023-03-06 RX ADMIN — LEVOTHYROXINE SODIUM 100 MCG: 0.1 TABLET ORAL at 06:21

## 2023-03-06 RX ADMIN — Medication 200 MG: at 21:55

## 2023-03-06 RX ADMIN — MAGNESIUM SULFATE HEPTAHYDRATE 2000 MG: 40 INJECTION, SOLUTION INTRAVENOUS at 21:48

## 2023-03-06 RX ADMIN — SEVELAMER CARBONATE 800 MG: 800 TABLET, FILM COATED ORAL at 14:54

## 2023-03-06 RX ADMIN — MONTELUKAST 10 MG: 10 TABLET, FILM COATED ORAL at 08:42

## 2023-03-06 RX ADMIN — PANTOPRAZOLE SODIUM 40 MG: 40 TABLET, DELAYED RELEASE ORAL at 06:21

## 2023-03-06 RX ADMIN — MOMETASONE FUROATE AND FORMOTEROL FUMARATE DIHYDRATE 2 PUFF: 200; 5 AEROSOL RESPIRATORY (INHALATION) at 19:03

## 2023-03-06 RX ADMIN — PROPOFOL 5 MCG/KG/MIN: 10 INJECTION, EMULSION INTRAVENOUS at 21:46

## 2023-03-06 RX ADMIN — PROPOFOL 10 MCG/KG/MIN: 10 INJECTION, EMULSION INTRAVENOUS at 21:50

## 2023-03-06 RX ADMIN — CARVEDILOL 25 MG: 12.5 TABLET, FILM COATED ORAL at 08:42

## 2023-03-06 RX ADMIN — APIXABAN 2.5 MG: 2.5 TABLET, FILM COATED ORAL at 08:42

## 2023-03-06 RX ADMIN — PROPOFOL 20 MCG/KG/MIN: 10 INJECTION, EMULSION INTRAVENOUS at 22:02

## 2023-03-06 RX ADMIN — CINACALCET HYDROCHLORIDE 30 MG: 30 TABLET, FILM COATED ORAL at 08:42

## 2023-03-06 RX ADMIN — AMIODARONE HYDROCHLORIDE 200 MG: 200 TABLET ORAL at 08:42

## 2023-03-06 RX ADMIN — MOMETASONE FUROATE AND FORMOTEROL FUMARATE DIHYDRATE 2 PUFF: 200; 5 AEROSOL RESPIRATORY (INHALATION) at 07:19

## 2023-03-06 RX ADMIN — APIXABAN 2.5 MG: 2.5 TABLET, FILM COATED ORAL at 20:30

## 2023-03-06 RX ADMIN — PROCHLORPERAZINE EDISYLATE 10 MG: 5 INJECTION INTRAMUSCULAR; INTRAVENOUS at 02:38

## 2023-03-06 RX ADMIN — PROCHLORPERAZINE EDISYLATE 10 MG: 5 INJECTION INTRAMUSCULAR; INTRAVENOUS at 08:45

## 2023-03-06 RX ADMIN — ETOMIDATE 15 MG: 20 INJECTION, SOLUTION INTRAVENOUS at 21:35

## 2023-03-06 RX ADMIN — LOSARTAN POTASSIUM 50 MG: 25 TABLET, FILM COATED ORAL at 08:42

## 2023-03-06 RX ADMIN — PROPOFOL 15 MCG/KG/MIN: 10 INJECTION, EMULSION INTRAVENOUS at 21:57

## 2023-03-06 RX ADMIN — SEVELAMER CARBONATE 800 MG: 800 TABLET, FILM COATED ORAL at 08:43

## 2023-03-06 RX ADMIN — SEVELAMER CARBONATE 800 MG: 800 TABLET, FILM COATED ORAL at 20:29

## 2023-03-06 ASSESSMENT — PAIN - FUNCTIONAL ASSESSMENT: PAIN_FUNCTIONAL_ASSESSMENT: ACTIVITIES ARE NOT PREVENTED

## 2023-03-06 ASSESSMENT — PAIN DESCRIPTION - LOCATION
LOCATION: ARM
LOCATION: BACK

## 2023-03-06 ASSESSMENT — PAIN SCALES - GENERAL
PAINLEVEL_OUTOF10: 8
PAINLEVEL_OUTOF10: 0
PAINLEVEL_OUTOF10: 1
PAINLEVEL_OUTOF10: 0
PAINLEVEL_OUTOF10: 4
PAINLEVEL_OUTOF10: 0
PAINLEVEL_OUTOF10: 0
PAINLEVEL_OUTOF10: 10

## 2023-03-06 ASSESSMENT — PAIN DESCRIPTION - PAIN TYPE
TYPE: ACUTE PAIN

## 2023-03-06 ASSESSMENT — PAIN SCALES - WONG BAKER
WONGBAKER_NUMERICALRESPONSE: 0

## 2023-03-06 ASSESSMENT — PAIN DESCRIPTION - ORIENTATION: ORIENTATION: RIGHT;OUTER;UPPER

## 2023-03-06 ASSESSMENT — PULMONARY FUNCTION TESTS: PIF_VALUE: 12

## 2023-03-06 ASSESSMENT — PAIN DESCRIPTION - DESCRIPTORS: DESCRIPTORS: SORE

## 2023-03-06 ASSESSMENT — PAIN DESCRIPTION - ONSET: ONSET: PROGRESSIVE

## 2023-03-06 NOTE — FLOWSHEET NOTE
03/06/23 1852   Handoff   Communication Given Shift Handoff   Handoff Given To Viola Spears RN   Handoff Received From Magno Amezcua RN   Handoff Communication Face to Face; At bedside   Time Handoff Given 1852   End of Shift Check Performed Yes

## 2023-03-06 NOTE — PROGRESS NOTES
Comprehensive Nutrition Assessment    Type and Reason for Visit:  Reassess    Nutrition Recommendations/Plan:   Recommend EN , If warranted begin Nepro via NGT at 20 ml/hr (full recommendation below)  Check Mg, Phosphorus in am and daily until goal  If EN not warranted, continue to offer and encourage PO food and supplements, change diet to regular to see if appetite/intake can be improved. Malnutrition Assessment:  Malnutrition Status:  Severe malnutrition (03/03/23 2120)    Context:      Chronic Condition  Findings of the 6 clinical characteristics of malnutrition:  Energy Intake:  75% or less estimated energy requirements for 1 month or longer  Weight Loss:  No significant weight loss     Body Fat Loss:  Mild body fat loss Triceps   Muscle Mass Loss:  Severe muscle mass loss Temples (temporalis), Clavicles (pectoralis & deltoids), Hand (interosseous)  Fluid Accumulation:  Severe Generalized   Strength:  Not Performed    Nutrition Assessment:    Pt intake remains 0% meals and supplements; nursing reports pt takes medications over a period of time due to Nausea. Nausea medications given and pt sleeps most of the day. Discussed EN with provider, and he gave pt the option for EN although he was not sure pt was a surgical candidate. Cardiology note - pt with 1st degree AV block with afib,  RVR. Na 124, Cl 86- due  in part to poor intake. Nutrition Related Findings:    Na 132-, cl 95 with poor intake less than 1000 mg/d . Glucose 103 on IVF D5. Wound Type: None (Ecchymosis no Pressure Injury)       Current Nutrition Intake & Therapies:    Average Meal Intake: Unable to assess  Average Supplements Intake: 0%  ADULT DIET;  Regular; 3 carb choices (45 gm/meal)  ADULT ORAL NUTRITION SUPPLEMENT; Breakfast, Lunch, Dinner; Renal Oral Supplement  Current Tube Feeding (TF) Orders:  Feeding Route: Nasogastric  Formula: Renal Formula  Schedule: Continuous  Feeding Regimen: Nepro 20 ml/hr increase by 10 ml every 8 hours to goal 50 ml/hr  Additives/Modulars: None  Water Flushes: 150 ml every 6 hours  Current TF & Flush Orders Provides:    Goal TF & Flush Orders Provides: 2124 Kcal, 97 gm protein, 1385 ml free H2O    Anthropometric Measures:  Height: 5' 1\" (154.9 cm)  Ideal Body Weight (IBW): 105 lbs (48 kg)    Admission Body Weight: 156 lb 1.4 oz (70.8 kg)  Current Body Weight: 141 lb 15.6 oz (64.4 kg), 148.7 % IBW. Weight Source: Bed Scale  Current BMI (kg/m2): 26.8  Usual Body Weight: 150 lb (68 kg)  % Weight Change (Calculated): -5.3  Weight Adjustment For: No Adjustment      BMI Categories: Overweight (BMI 25.0-29. 9)    Estimated Daily Nutrient Needs:  Energy Requirements Based On: Kcal/kg     Energy (kcal/day): 0044-2600 Kcal/d (25-30 Kcal/kg)     Protein (g/day): 65-78 gm/d (1.0-1.2 gm/kg))  Method Used for Fluid Requirements: 1 ml/kcal  Fluid (ml/day): 1500 ml/d    Nutrition Diagnosis:    In context of chronic illness, Severe malnutrition related to endocrine dysfuntion, renal dysfunction, early satiety, psychological cause or life stress as evidenced by Criteria as identified in malnutrition assessment, intake 0-25%, vomiting, poor dentition, nausea    Nutrition Interventions:   Food and/or Nutrient Delivery: Start Tube Feeding  Nutrition Education/Counseling: Education declined  Coordination of Nutrition Care: Continue to monitor while inpatient  Plan of Care discussed with: Provider, Nurse    Goals:  Previous Goal Met: No Progress toward Goal(s)  Goals: Initiate nutrition support, PO intake 50% or greater, by next RD assessment  Specify Other Goals: if no improvement with supplements , consider EN    Nutrition Monitoring and Evaluation:   Behavioral-Environmental Outcomes: Readiness for Change  Food/Nutrient Intake Outcomes: Food and Nutrient Intake, Enteral Nutrition Intake/Tolerance  Physical Signs/Symptoms Outcomes: Biochemical Data, Nausea or Vomiting, Fluid Status or Edema, Nutrition Focused Physical Findings    Discharge Planning:     Too soon to determine     [address] [facility] 22: [phone] or [place]

## 2023-03-06 NOTE — PROGRESS NOTES
Bedside shift change report given to YESENIA Velasquez RN (oncoming nurse) by DANA Boykin LPN (offgoing nurse). Report included the following information SBAR, Intake/Output, MAR, Recent Results, Med Rec Status, and Quality Measures. 1930  Shift assessment completed. Pt A&Ox4 and states she is nauseous. Will review medication administrations and treat when available. VSS. SR with a 1st degree AVB (JONY 0.24) on telemetry which is new since last nights shift. QT elongated at 0.48. Lungs clear in all lobes and BS present. Pt has a lack of appetite. Spoke with her about going home and trying to eat more to keep her blood sugar up so she doesn't drop too low. She stated she understood. D5 infusing at 50ml/hr in to patent double lumen Mid line in R upper arm. Both ports have blood return. Offered pt a bath but she declined. CBWR.     1940  Called provider and advised of her 1st degree block, prolonged QT and her current orders for Zofran. Provider mentioned her Amiodarone. New order for Compazine 10mg IVP Q6H and the Zofran has been held. 2100  While administering PO medication, pt spit her pill back out in the medicine cup and proceeded to vomit 200ml of curdled yellow/orange liquid. Compazine administered. Pt given mouthwash to swish and spit. 2115  Pt able to swallow her Eliquis without nausea and she states her nausea is better since the administration of the Compazine. 0015 2400 VSS. SR with a 1st degree AVB on telemetry. Pt is able to reposition self and denies needs at this time. 0235  Pt C/O nausea. Compazine administered. Pt had a wet brief and was cleaned and changed. CBWR.     1713  Blood drawn for AM labs. FSBG 109. Pt denies pain or needs. 9717  Bedside shift change report given to Louis Frausto RN (oncoming nurse) by YESENIA Velasquez RN (offgoing nurse). Report included the following information SBAR, Intake/Output, MAR, Recent Results, Med Rec Status, and Quality Measures.

## 2023-03-06 NOTE — PROGRESS NOTES
1900 Assumed care of pt from off going nurse Polo Aviles RN. Pt is A&Ox4 with no c/o and currently receiving dialysis at bedside. 1942 Dialysis complete w/ 2 kg removed. 1954 POC glucose=90. Pt given 1(8oz) carton of Nepro per request.    2000 Pt experienced 1 run of Vtach which lasted approximately 6 seconds. Provider KEKE Zamora Abrams, [place] notified. 2040 RT in for 12 lead EKG. 2100 Pt had 1 run of Vtach lasting approximately 5 seconds. Provider @ bedside. New orders for 12 lead EKG STAT. EKG performed by RT.    2120 Pt had 1 run of Vtach while this nurse @ bedside and became unresponsive. Code initiated (see code narrator). CHRIS Aponte to notify daughter of change in pt status. 0400 POC glucose 86. Temp 101.2 (Axillary). Tylenol suppository gven. Bedside and Verbal shift change report given to CLAUDIO Frederick RN (oncoming nurse) by Celestino Torres RN  (offgoing nurse). Report included the following information Nurse Handoff Report, Intake/Output, MAR, and Recent Results.

## 2023-03-06 NOTE — PROGRESS NOTES
CARDIOLOGY PROGRESS NOTE - NP    Patient seen and examined. This is a patient who is followed for atrial flutter with RVR. She is resting comfortably, but arouses easily. Remains in NSR. Now with 1st degree AV block. She reports general malaise but no specific complaints. No chest pain or dyspnea. No palpitations. No nausea or vomiting. No other complaints reported. Telemetry reviewed, there were no events noted in the past 24 hours. NSR w/ 1st degree AV block. Pertinent review of systems items noted above, all other systems are negative. Current medications reviewed. Physical Examination  Vital signs are stable. Blood pressure 134/62, Pulse 81  No apparent distress. Heart is regular, rate and rhythm. Normal S1, S2, no murmurs are appreciated. Lungs are clear bilaterally. Abdomen is soft, nontender, normal bowel sounds. Extremities have no edema. Labs reviewed: , Crt 7.36    Case discussed with Dr. Starla Haney and our impression and recommendations are as follows:  Atrial flutter with RVR:  Spontaneously converted to NSR on amiodarone gtt following bolus. She was on chronic amiodarone therapy as an OP per primary cardiologist.  Amiodarone 200mg daily initiated by primary. Ideally she would not remain on this long term given young age, however, given its efficacy in this acute setting we will continue for now. She denies having been offered an ablation previously. She is agreeable to an EP consultation at [facility] to discuss rhythm management options. Continue Eliquis 2.5mg twice daily. Echo with preserved EF, grade 2 diastolic dysfunction. No known non-compliance with DOAC. TSH 13.9. Continue supplementation and have her follow-up with her primary. Altered mental status:  Stat head CT and labs unremarkable. Mental acuity is improving though is still somnolent. IV access difficult but now has PICC placed. No known missed doses of Eliquis.  Per Care Everywhere she had a cardiac cath in 2020 that showed tortuous vessels but no significant CAD. Hypertension:  Acceptable. Plans for HD today. Stable from cardiac perspective. EP consultation with Dr. Abraham Roberson scheduled on 4/27 @ 10am. Will also arrange NP follow up in the next couple weeks for MCOT placement. Please do not hesitate to call me or Dr. Lala Amin if additional questions arise.

## 2023-03-06 NOTE — PROGRESS NOTES
Physician Progress Note      PATIENT:               Angel Luis Gutiérrez  CSN #:                  [id number]  :                       1955  ADMIT DATE:       3/1/2023 4:08 AM  [address] [place] DATE:  RESPONDING  PROVIDER #:        Mahesh Ellis MD          QUERY TEXT:    Dear Hospitalist  Pt admitted with   N&V  and has encephalopathy documented. If possible, please   document in progress notes and discharge summary further specificity   regarding the type of encephalopathy:    The medical record reflects the following:  Risk Factors: ESRD  on  HD   has missed some  treatments  Clinical Indicators: Patients brother at bedside, patient would wake up for   about 2-3 seconds then fall back to sleep. When patient was awake she was   very lethargic. Pt arouses to stimuli however quickly closes eyes. Pt unable to stay awake and   focused enough for anything PO. Pt temp 102.1 axillary. Treatment: CT head- neg; Awaiting  UA  check for infection. Thank you,   Russel Agudelo RN   CCDS  Options provided:  -- Metabolic encephalopathy  -- Other - I will add my own diagnosis  -- Disagree - Not applicable / Not valid  -- Disagree - Clinically unable to determine / Unknown  -- Refer to Clinical Documentation Reviewer    PROVIDER RESPONSE TEXT:    This patient has metabolic encephalopathy.     Query created by: Jeannette Desouza on 3/3/2023 8:03 AM      Electronically signed by:  Mahesh Ellis MD 3/6/2023 12:03 PM

## 2023-03-06 NOTE — PROCEDURES
Hemodialysis note 03/06/2023. Patient awake and oriented x 4. Patient confirmed via identifiers x2 at bedside. See flowsheet for VS.    Left UPPER arm AV Fistula without evidence of warmth, redness, or drainage. +thrill/+bruit. Each access site disinfected for 60 seconds per site with alcohol swabs per P&P. Cannulated with 15G needles x2 and secured with paper tape. +aspiration/+flushed. Hep B Ag - negative 02/10/2023  Hep B Ab - susceptible 02/10/2023     1458 Contacted DR. Carballo regarding K is 4 and NA is 124. He ordered to increase the bath from 2 K to 3 K and \"Try to remove more fluids if BP allows\". The UF goal increased from 2000 to 3000.   1540 The patient had an EKG done that showed prolonged QT and cardiology was reconsulted. The patient is asymptomatic Made Dr. Zoey Grigsby aware who ordered to start dialysis treatment    [phone] HD treatment initiated per physician order. 1700 Blood pressure dropped 105/47, the patient reports feeling dizzy. UF goal decreased from 3000 to 2000   1705 The patient reports improvement in her dizzness, /48  1735 UF goal increased from 2000 to 2500, the patient denies symptoms of hypotension. 1800 The patient denies symptoms of hypotension /60.   1805 The patient denies symptoms of hypotension but BP is 99/51, UF goal adjusted to 2000 ml.  1815 The patient denies dizziness, /54, UF paused  1820 /58, UF resumed, the patient denies symptoms. 1910 HD treatment completed per physician order. All possible blood returned to patient. Needles removed x 2 with tips intact. Hemostasis achieved in  20 minutes. Access site dressings clean, dry, and intact. +thrill remains. Bedside report to Humberto Crockett RN.

## 2023-03-06 NOTE — DISCHARGE SUMMARY
Discharge Summary       PATIENT ID: Nehemias Roque  MRN: [id number]   YOB: 1955    DATE OF ADMISSION: 3/1/2023  4:08 AM    DATE OF DISCHARGE: 03/06/23    PRIMARY CARE PROVIDER: Eugene Mckeon MD     ATTENDING PHYSICIAN: Criselda Graf MD  DISCHARGING PROVIDER: Criselda Graf MD        CONSULTATIONS: IP CONSULT TO NEPHROLOGY  IP CONSULT TO ANESTHESIOLOGY  IP CONSULT TO CARDIOLOGY    PROCEDURES/SURGERIES: * No surgery found *    ADMITTING [address] [place] [address] COURSE:   Nehemias Roque is a 74 y.o. female  with ESRD HD, failed renal transplant, HTN, HLP, Hypothyroididsm, and CMV+, who represented herself to the ED today with c/o nausea, vomit, abdominal cramps x 3 days, No missed HD, states HD series is q [facility] only at [facility]. Was here in ED 3 days ago requesting admit for same complaints but was discharged home, she has not been eating, she appears withdrawn, Hx is limited because she appears withdrawn, not wanting to communicate, or partake in her care, she states she has been Just nauseous, no chest pain, sob, fever or chills, no other complaints voiced. Last BM was this morning and loose. Hospitalist was asked to admit. DISCHARGE DIAGNOSES / PLAN:      Hospital course / Assessment and Plans   Principle Problems:  Acute Encephalopathy - resolved   - secondary to on compliance with with medications and HD , she missed last dialysis , TSH 13.9   -CT head no acute process  -improved with HD and resuming synthroid   Atrial Fibrillation:  - secondary to the patient not taking her medication due uremic anorexia and vomiting  -transferred to ICU for closer monitor, starting IV Amiodarone  - Converted to NSR, transition to PO after HD and her vomiting improved   Intractable nausea, vomit, abdominal cramps  - 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    ESRD  -HD on MW  -nephrologist for HD   Hypertension:  -holding oral medications for now due to mental status  -PRN Hydralazine   Depression:  -not wanting to eat, or drink, or get oob,  Non-suicidal., may related to uremic syndrome  -recommend pcp referral for psyche eval at d/c. Hypoglycemia:  -started on low rate of D5  -monitor glucose closely  Hypothyroidism  -current TSH 13.90, increase from most recent TSH of 12.52  -will increase Synthroid to 100 mcg at this time  NON-COMPLIANCE   - with medications and HD - educated and counseled      Full Code   DVT prophylaxis: pharmacologic and mechanical      Day of dc  Pt has hyponatremia today and remains depressed, without appetite, and nauseous. I have asked nursing to ensure nephrology is aware of her hyponatremia and ensure proper bath is used. She will receive anti emetics. I have asked her to follow with her PCP for depression as well as consider gi referral for chronic nausea, she may be a candidate for reglan therapy. Hopefully addressing her hypothyroidism will also help her depression. We will plan to recheck her Na levels prior to dc, if 128 or greater ok for dc. NOTIFY YOUR PHYSICIAN FOR ANY OF THE FOLLOWING:   Fever over 101 degrees for 24 hours. Chest pain, shortness of breath, fever, chills, nausea, vomiting, diarrhea, change in mentation, falling, weakness, bleeding. Severe pain or pain not relieved by medications. Or, any other signs or symptoms that you may have questions about.     Current Discharge Medication List        CONTINUE these medications which have NOT CHANGED    Details   ondansetron (ZOFRAN) 4 MG tablet Take 1 tablet by mouth 3 times daily as needed for Nausea or Vomiting  Qty: 15 tablet, Refills: 0      fluticasone-umeclidin-vilant (TRELEGY ELLIPTA) 100-62.5-25 MCG/ACT AEPB inhaler Inhale 1 puff into the lungs daily  Qty: 60 each, Refills: 0      docusate sodium (COLACE, DULCOLAX) 100 MG CAPS Take 100 mg by mouth 2 times daily  Qty: 60 capsule, Refills: 0      albuterol sulfate HFA (PROVENTIL;VENTOLIN;PROAIR) 108 (90 Base) MCG/ACT inhaler Inhale 1 puff into the lungs every 4 hours as needed      albuterol (PROVENTIL) (2.5 MG/3ML) 0.083% nebulizer solution USE 1 VIAL VIA NEBULIZER THREE TIMES DAILY      aluminum & magnesium hydroxide-simethicone (MYLANTA) 400-400-40 MG/5ML SUSP Take 10 mLs by mouth every 6 hours as needed      amiodarone (CORDARONE) 200 MG tablet TAKE 1 TABLET BY MOUTH EVERY DAY      amLODIPine (NORVASC) 10 MG tablet Take 1 tablet by mouth once daily      apixaban (ELIQUIS) 2.5 MG TABS tablet Take 2.5 mg by mouth 2 times daily      calcitRIOL (ROCALTROL) 0.5 MCG capsule Take 0.5 mcg by mouth See Admin Instructions      carvedilol (COREG) 25 MG tablet Take 25 mg by mouth 2 times daily (with meals)      cinacalcet (SENSIPAR) 30 MG tablet Take 30 mg by mouth daily      dexlansoprazole (DEXILANT) 60 MG CPDR delayed release capsule TAKE 1 CAPSULE BY MOUTH IN THE MORNING.       dicyclomine (BENTYL) 10 MG capsule Take 10 mg by mouth daily      doxercalciferol (HECTOROL) 4 MCG/2ML injection Infuse 6 mcg intravenously      EPINEPHrine (EPIPEN) 0.3 MG/0.3ML SOAJ injection INJECT 0.3 ML INTRAMUSCULARLY ONCE AS NEEDED FOR ALLERGIC RESPONSE      epoetin amando (EPOGEN;PROCRIT) 40145 UNIT/ML injection Inject 10,000 Units into the skin      estradiol (ESTRACE VAGINAL) 0.1 MG/GM vaginal cream INSERT 2 GRAMS INTO VAGINA EVERY MONDAY AND THURSDAY      famotidine (PEPCID) 20 MG tablet Take 20 mg by mouth daily      fluticasone (FLONASE) 50 MCG/ACT nasal spray USE 1 SPRAY IN EACH NOSTRIL EVERY MORNING      Hyoscyamine Sulfate SL 0.125 MG SUBL Place 0.125 mg under the tongue every 4 hours as needed      levothyroxine (SYNTHROID) 75 MCG tablet Take 75 mcg by mouth every morning (before breakfast)      losartan (COZAAR) 50 MG tablet Take 50 mg by mouth daily      meclizine (ANTIVERT) 25 MG tablet TAKE 1 TABLET THREE TIMES DAILY AS NEEDED FOR DIZZINESS      montelukast (SINGULAIR) 10 MG tablet Take 10 mg by mouth daily ondansetron (ZOFRAN-ODT) 4 MG disintegrating tablet Take 8 mg by mouth every 8 hours as needed      polyethylene glycol (GLYCOLAX) 17 GM/SCOOP powder Take 17 g by mouth 2 times daily      predniSONE (DELTASONE) 5 MG tablet Take 1 tablet by mouth daily      sevelamer (RENVELA) 800 MG tablet Take 800 mg by mouth 3 times daily      simvastatin (ZOCOR) 20 MG tablet TAKE 1 TABLET BY MOUTH DAILY AS DIRECTED.      sucralfate (CARAFATE) 1 GM tablet TAKE 1 TABLET BY MOUTH FOUR TIMES DAILY      traZODone (DESYREL) 50 MG tablet Take 50 mg by mouth      valGANciclovir (VALCYTE) 450 MG tablet Take 900 mg by mouth daily              DISPOSITION:home with sister and HH    Home With:   OT  PT  HH  RN       Long term SNF/Inpatient Rehab    Independent/assisted living    Hospice    Other:       PATIENT CONDITION AT DISCHARGE:     Functional status    Poor    x Deconditioned     Independent      Cognition   x  Lucid     Forgetful     Dementia      Catheters/lines (plus indication)    [facility]     PICC     PEG    x None      Code status    x Full code     DNR      PHYSICAL EXAMINATION AT DISCHARGE:  General:          Alert, cooperative, no distress, appears stated age. HEENT:           Atraumatic, anicteric sclerae, pink conjunctivae                          No oral ulcers, mucosa moist, throat clear, dentition fair  Neck:               Supple, symmetrical  Lungs:             Clear to auscultation bilaterally. No Wheezing or Rhonchi. No rales. Chest wall:      No tenderness  No Accessory muscle use. Heart:              Regular  rhythm,  No  murmur   No edema  Abdomen:        Soft, non-tender. Not distended. Bowel sounds normal  Extremities:     No cyanosis. No clubbing,                            Skin turgor normal, Capillary refill normal  Skin:                Not pale.   Not Jaundiced  No rashes   Psych:             depressed  Neurologic:      Alert, moves all extremities, answers questions appropriately and responds to commands       CHRONIC MEDICAL DIAGNOSES:      Greater than 35 minutes were spent with the patient on counseling and coordination of care    Signed:   Li Eddy MD  3/6/2023  8:52 AM

## 2023-03-06 NOTE — PROGRESS NOTES
0730:  Initial shift assessment complete, see flowsheet. Patient is A&OX4, however lethargic and requires a lot of verbal and physical stimulation to maintain attention or follow commands. She maintains a poor appetite, stating she would try to eat some of her breakfast, but did not touch her tray despite persistent encouragement. 9876:  MD rounded on patient. Discontinued fluids per provider. Both ports on midline cath flushed, with good blood return noted and capped. Dialysis nurse notified of patient's last sodium and to address during dialysis per provider. Linens changed to sit patient appropriately upright, but her head tilts forward despite instructions to lift her chin. Patient was able to successfully take her AM meds uneventfully crushed in applesauce with persistent verbal instruction. BG 97 this AM. CBWR.    1132:  Patient c/o continued nausea. Scheduled for dialysis later this afternoon. PRN compazine administered earlier this morning at 0845. Patient is refusing her lunch tray, but left it at the bedside in case she changes her mind. Lights dimmed. Emesis bag and cool cloth provided and thermostat adjusted for patient c/o being hot, she is afebrile at this time. No other needs expressed at this time. CBWR.    8066:  EKG ordered per provided and completed by RT for elevated T waves. Patient has no c/o chest pain or SOB. Provider notified of result per RT. Dialysis nurse at bedside setting up HD. Plan is to replete Na with HD and recheck BMP once finished. 1547:  Spoke with Rosie Lee NP with cardiology per patient's EKG results. Provider would like to discontinue amiodarone and repeat EKG after HD and another repeat EKG first thing in the AM.    1622:  Repositioned patient with dialysis nurse per patient c/o back pain. Patient states some relief with intervention. 1814:  Updated patient's daughter via telephone.

## 2023-03-07 LAB
ANION GAP SERPL CALC-SCNC: 12 MMOL/L (ref 3–18)
ARTERIAL PATENCY WRIST A: YES
BASE EXCESS BLDA CALC-SCNC: 6.4 MMOL/L (ref 0–3)
BDY SITE: ABNORMAL
BUN SERPL-MCNC: 20 MG/DL (ref 7–18)
BUN/CREAT SERPL: 5 (ref 12–20)
CA-I BLD-MCNC: 8 MG/DL (ref 8.5–10.1)
CHLORIDE SERPL-SCNC: 93 MMOL/L (ref 100–111)
CO2 SERPL-SCNC: 27 MMOL/L (ref 21–32)
COHGB MFR BLD: 0.3 % (ref 1–2)
CREAT SERPL-MCNC: 4.25 MG/DL (ref 0.6–1.3)
EKG ATRIAL RATE: 85 BPM
EKG ATRIAL RATE: 94 BPM
EKG DIAGNOSIS: NORMAL
EKG P AXIS: 54 DEGREES
EKG P AXIS: 61 DEGREES
EKG P-R INTERVAL: 184 MS
EKG P-R INTERVAL: 190 MS
EKG Q-T INTERVAL: 476 MS
EKG Q-T INTERVAL: 496 MS
EKG Q-T INTERVAL: 582 MS
EKG Q-T INTERVAL: 598 MS
EKG QRS DURATION: 82 MS
EKG QRS DURATION: 84 MS
EKG QRS DURATION: 86 MS
EKG QRS DURATION: 90 MS
EKG QTC CALCULATION (BAZETT): 590 MS
EKG QTC CALCULATION (BAZETT): 595 MS
EKG QTC CALCULATION (BAZETT): 719 MS
EKG QTC CALCULATION (BAZETT): 735 MS
EKG R AXIS: 51 DEGREES
EKG R AXIS: 58 DEGREES
EKG R AXIS: 59 DEGREES
EKG R AXIS: 62 DEGREES
EKG T AXIS: 84 DEGREES
EKG T AXIS: 89 DEGREES
EKG T AXIS: 95 DEGREES
EKG T AXIS: 95 DEGREES
EKG VENTRICULAR RATE: 85 BPM
EKG VENTRICULAR RATE: 91 BPM
EKG VENTRICULAR RATE: 92 BPM
EKG VENTRICULAR RATE: 94 BPM
FIO2 ON VENT: 30 %
GLUCOSE BLD STRIP.AUTO-MCNC: 77 MG/DL (ref 70–110)
GLUCOSE BLD STRIP.AUTO-MCNC: 84 MG/DL (ref 70–110)
GLUCOSE BLD STRIP.AUTO-MCNC: 86 MG/DL (ref 70–110)
GLUCOSE BLD STRIP.AUTO-MCNC: 90 MG/DL (ref 70–110)
GLUCOSE BLD STRIP.AUTO-MCNC: 96 MG/DL (ref 70–110)
GLUCOSE SERPL-MCNC: 92 MG/DL (ref 74–99)
HBV SURFACE AG SER QL: <0.1 INDEX
HBV SURFACE AG SER QL: NEGATIVE
HCO3 BLDA-SCNC: 29 MMOL/L (ref 22–26)
HCT VFR BLD AUTO: 18.5 % (ref 35–45)
HGB BLD-MCNC: 5.7 G/DL (ref 12–16)
MAGNESIUM SERPL-MCNC: 2.8 MG/DL (ref 1.6–2.6)
MAGNESIUM SERPL-MCNC: 3 MG/DL (ref 1.6–2.6)
MCH RBC QN AUTO: 30 PG (ref 24–34)
MCHC RBC AUTO-ENTMCNC: 30.8 G/DL (ref 31–37)
METHGB MFR BLD: 0.3 % (ref 0–1.4)
OXYHGB MFR BLD: 89.8 % (ref 95–99)
PCO2 BLDA: 33 MMHG (ref 35–45)
PEEP RESPIRATORY: 5
PERFORMED BY:: ABNORMAL
PERFORMED BY:: NORMAL
PH BLDA: 7.56 (ref 7.35–7.45)
PO2 BLDA: 53 MMHG (ref 80–100)
POTASSIUM SERPL-SCNC: 3.5 MMOL/L (ref 3.5–5.5)
SAO2 % BLD: 90 % (ref 95–99)
SAO2% DEVICE SAO2% SENSOR NAME: ABNORMAL
SERVICE CMNT-IMP: ABNORMAL
SODIUM SERPL-SCNC: 132 MMOL/L (ref 136–145)
SPECIMEN SITE: ABNORMAL
TROPONIN I SERPL HS-MCNC: 51 NG/L (ref 0–54)
TROPONIN I SERPL HS-MCNC: 52 NG/L (ref 0–54)
VT SETTING VENT: 400

## 2023-03-07 PROCEDURE — 6370000000 HC RX 637 (ALT 250 FOR IP): Performed by: NURSE PRACTITIONER

## 2023-03-07 PROCEDURE — 6370000000 HC RX 637 (ALT 250 FOR IP): Performed by: INTERNAL MEDICINE

## 2023-03-07 PROCEDURE — 83735 ASSAY OF MAGNESIUM: CPT

## 2023-03-07 PROCEDURE — 2580000003 HC RX 258: Performed by: NURSE PRACTITIONER

## 2023-03-07 PROCEDURE — 84484 ASSAY OF TROPONIN QUANT: CPT

## 2023-03-07 PROCEDURE — 2000000000 HC ICU R&B

## 2023-03-07 PROCEDURE — 94640 AIRWAY INHALATION TREATMENT: CPT

## 2023-03-07 PROCEDURE — 36600 WITHDRAWAL OF ARTERIAL BLOOD: CPT

## 2023-03-07 PROCEDURE — 85014 HEMATOCRIT: CPT

## 2023-03-07 PROCEDURE — 30233N1 TRANSFUSION OF NONAUTOLOGOUS RED BLOOD CELLS INTO PERIPHERAL VEIN, PERCUTANEOUS APPROACH: ICD-10-PCS | Performed by: INTERNAL MEDICINE

## 2023-03-07 PROCEDURE — 82803 BLOOD GASES ANY COMBINATION: CPT

## 2023-03-07 PROCEDURE — 82962 GLUCOSE BLOOD TEST: CPT

## 2023-03-07 PROCEDURE — 6360000002 HC RX W HCPCS: Performed by: NURSE PRACTITIONER

## 2023-03-07 PROCEDURE — 36430 TRANSFUSION BLD/BLD COMPNT: CPT

## 2023-03-07 PROCEDURE — 99222 1ST HOSP IP/OBS MODERATE 55: CPT | Performed by: INTERNAL MEDICINE

## 2023-03-07 PROCEDURE — 86900 BLOOD TYPING SEROLOGIC ABO: CPT

## 2023-03-07 PROCEDURE — 94761 N-INVAS EAR/PLS OXIMETRY MLT: CPT

## 2023-03-07 PROCEDURE — 1100000000 HC RM PRIVATE

## 2023-03-07 PROCEDURE — 94003 VENT MGMT INPAT SUBQ DAY: CPT

## 2023-03-07 PROCEDURE — 2580000003 HC RX 258: Performed by: INTERNAL MEDICINE

## 2023-03-07 PROCEDURE — 80048 BASIC METABOLIC PNL TOTAL CA: CPT

## 2023-03-07 RX ORDER — 0.9 % SODIUM CHLORIDE 0.9 %
1000 INTRAVENOUS SOLUTION INTRAVENOUS ONCE
Status: COMPLETED | OUTPATIENT
Start: 2023-03-07 | End: 2023-03-07

## 2023-03-07 RX ORDER — SODIUM CHLORIDE 9 MG/ML
INJECTION, SOLUTION INTRAVENOUS PRN
Status: COMPLETED | OUTPATIENT
Start: 2023-03-07 | End: 2023-03-07

## 2023-03-07 RX ADMIN — SEVELAMER CARBONATE 800 MG: 800 TABLET, FILM COATED ORAL at 20:32

## 2023-03-07 RX ADMIN — PROPOFOL 20 MCG/KG/MIN: 10 INJECTION, EMULSION INTRAVENOUS at 09:13

## 2023-03-07 RX ADMIN — SODIUM CHLORIDE 1000 ML: 9 INJECTION, SOLUTION INTRAVENOUS at 14:23

## 2023-03-07 RX ADMIN — ACETAMINOPHEN 650 MG: 650 SUPPOSITORY RECTAL at 04:49

## 2023-03-07 RX ADMIN — BUDESONIDE 500 MCG: 0.5 SUSPENSION RESPIRATORY (INHALATION) at 07:52

## 2023-03-07 RX ADMIN — SODIUM CHLORIDE: 9 INJECTION, SOLUTION INTRAVENOUS at 21:41

## 2023-03-07 RX ADMIN — ARFORMOTEROL TARTRATE 15 MCG: 15 SOLUTION RESPIRATORY (INHALATION) at 07:52

## 2023-03-07 RX ADMIN — MOMETASONE FUROATE AND FORMOTEROL FUMARATE DIHYDRATE 2 PUFF: 200; 5 AEROSOL RESPIRATORY (INHALATION) at 18:59

## 2023-03-07 RX ADMIN — ACETAMINOPHEN 650 MG: 325 TABLET ORAL at 21:42

## 2023-03-07 ASSESSMENT — PULMONARY FUNCTION TESTS
PIF_VALUE: 14
PIF_VALUE: 17
PIF_VALUE: 19

## 2023-03-07 ASSESSMENT — PAIN SCALES - GENERAL
PAINLEVEL_OUTOF10: 0

## 2023-03-07 ASSESSMENT — PAIN SCALES - WONG BAKER
WONGBAKER_NUMERICALRESPONSE: 0
WONGBAKER_NUMERICALRESPONSE: 0

## 2023-03-07 NOTE — CONSULTS
Gastroenterology Consult Note       Primary GI Physician: Mora Pierce MD    Referring Provider: Hospitalist team    Consult Date:  3/7/2023    Admit Date:  3/1/2023    Chief Complaint: Nausea and vomiting    Subjective:     History of Present Illness:  Patient is a 74 y.o. female who is seen in consultation for evaluation for nausea and vomiting. The history is from the patient, staff and medical records. The patient is well-known to me and was seen 9 months ago by me in the office. See office note from 5/26/2022 for details with similarities to her presentation today. The patient has multiple medical problems including failed kidney transplant, treated with hemodialysis, atrial flutter with rapid ventricular response, she is on amiodarone, hyponatremia, depression, and COPD. He was recently admitted for failure to thrive with nausea, inability to eat, vomiting, and just not feeling well. She was to be discharged, this was postponed because of the cardiac arrhythmia. She subsequently developed several episodes of ventricular tachycardia and hypoxemia requiring intubation and is presently sedated and on a ventilator. However, earlier today she was extubated and she is somnolent but awake and alert and able to answer some questions. She denies any nausea or vomiting at this time. She reportedly has been depressed and has chronic nausea and vomiting and gets hemodialysis every Monday, Wednesday, and Friday. When she was getting ready to be discharged her nausea and vomiting had resolved. Telemetry developed ventricular tachycardia and hypotension while she was being dialyzed. She reports she has had nausea and vomiting intermittently for years. The nursing staff states earlier this afternoon she did have some diarrhea with blood present and clots.     Pertinent labs show arterial blood gas of pH 7.56, PCO2 of 33, PO2 of 53, troponin 52, BUN/creatinine of 48 and 4.25, sodium 132, potassium 3.5, bicarbonate 27. CBC shows white blood cell count 6.8, hemoglobin 9.6, hematocrit 31.5.  2/26/2023 CT scan of the abdomen/pelvis revealed: No acute abnormality, status post right pelvic transplant of the kidney, diverticulosis. The patient has had multiple CT scans over the last 10 or more years. Some of these showed diverticulosis, a few showed diverticulitis, and the several most recent CT scans showed some type of colitis or inflammatory state although flexible sigmoidoscopy did not show inflammation as above. There is no reported fever, chills, abdominal pain, rectal bleeding, melena, reflux, dysphagia, weight loss, diarrhea, constipation, or change in bowel habits. Alcohol use-unobtainable. Tobacco use-unobtainable. Last EGD-7/2020 which showed a Schatzki's ring, gastritis, and hiatal hernia. Last colonoscopy-5/2022 which showed diverticulosis and large internal hemorrhoids. Random colon biopsies were done and showed mild inflammation related to diverticular disease but not diverticulitis or inflammatory bowel disease. Past medical history is significant for acute encephalopathy with this last admission, chronic renal failure with failed kidney transplant and hemodialysis Monday, Wednesday, Friday, nonulcer dyspepsia, cholecystectomy for gallstones, esophageal reflux, hyperlipidemia, asthma, paroxysmal atrial fibrillation with chronic amiodarone use, frequent urinary tract infections, anxiety, depression, history of spontaneous bacterial peritonitis, prior use of Eliquis, hypothyroidism.     PMH:  Past Medical History:   Diagnosis Date    [facility] 05/09/2019    Anxiety     Arrhythmia     Arthritis     Asthma     Asthma     Burning with urination     frequent uti    Calculus of gallbladder with acute cholecystitis without obstruction 10/09/2020    Cholecystitis 01/12/2019    Chronic kidney disease     dialysis    CMV (cytomegalovirus) antibody positive     Colitis 09/10/2022    COPD (chronic obstructive pulmonary disease) (HCC)     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    Dialysis patient [facility])     M/W/F    Diverticular disease of colon 08/21/2013    Dyspepsia and other specified disorders of function of stomach     stomach ulcer    Elevated troponin 10/21/2019    Encounter for cholecystectomy 05/06/2021 7/2020    Essential hypertension     GERD (gastroesophageal reflux disease)     History of chemotherapy     History of renal transplant 08/21/2013    (LD 2/12/2002)    HLD (hyperlipidemia)     Hypercholesteremia     Hypertension     Hypothyroidism 03/23/2022    Kidney transplant rejection     Menopause     Migraine     Obesity     Osteoporosis     Pancreatitis 08/28/2022    Pyelonephritis 07/13/2020    Recurrent urinary tract infection 09/27/2016    Renal cyst 2002    kidney transplant     Spontaneous bacterial peritonitis ([address] 01/16/2019    Ulcer        PSH:  Past Surgical History:   Procedure Laterality Date    CHOLECYSTECTOMY  02/2021    COLONOSCOPY  05/13/2022    COLONOSCOPY      Dr Coleen Dennison  5yrs ago    COLONOSCOPY      with Dr. Frederic Dennison  5 yrs ago    GI      ulcer    HEENT      sinus surg    KIDNEY TRANSPLANT      OTHER SURGICAL HISTORY  02/21/2022    SIGMOIDOSCOPY, FLEXIBLE;  Surgeon: Cathern Oppenheim, MD    TRANSPLANT      kidney transplant 2002    VASCULAR SURGERY      shunt in prep for dialysis       Allergies:   Allergies   Allergen Reactions    Aspirin Rash     Other reaction(s): Unknown (comments)    Bromfenac Rash     Other reaction(s): Unknown (comments)    Cefepime Other (See Comments)     Neurological reaction    Ceftriaxone Rash    Copper Rash    Ibuprofen Rash     Other reaction(s): Unknown (comments)    Ketorolac Tromethamine Rash     Other reaction(s): Unknown (comments)    Nabumetone Rash     Other reaction(s): Unknown (comments)    Rifampin Rash     Other reaction(s): Unknown (comments) Sulfamethoxazole-Trimethoprim Rash     Other reaction(s): Unknown (comments)    Vancomycin Rash     Other reaction(s): Unknown (comments)  Pt reports causes itching, takes benadryl prior to use. Pt denies anaphylaxis. Requires benadryl with each vancomycin dose       Home Medications:  Prior to Admission medications    Medication Sig Start Date End Date Taking?  Authorizing Provider   ondansetron (ZOFRAN) 4 MG tablet Take 1 tablet by mouth 3 times daily as needed for Nausea or Vomiting 2/26/23   Dale Schneider MD   fluticasone-umeclidin-vilant (TRELEGY ELLIPTA) 874-57.4-47 MCG/ACT AEPB inhaler Inhale 1 puff into the lungs daily 2/24/23   Monica Kirby MD   docusate sodium (COLACE, DULCOLAX) 100 MG CAPS Take 100 mg by mouth 2 times daily 2/14/23 3/16/23  Ho Young MD   albuterol sulfate HFA (PROVENTIL;VENTOLIN;PROAIR) 108 (90 Base) MCG/ACT inhaler Inhale 1 puff into the lungs every 4 hours as needed 11/3/22   Ar Automatic Reconciliation   albuterol (PROVENTIL) (2.5 MG/3ML) 0.083% nebulizer solution USE 1 VIAL VIA NEBULIZER THREE TIMES DAILY 12/23/21   Ar Automatic Reconciliation   aluminum & magnesium hydroxide-simethicone (MYLANTA) 400-400-40 MG/5ML SUSP Take 10 mLs by mouth every 6 hours as needed 9/17/20   Ar Automatic Reconciliation   amiodarone (CORDARONE) 200 MG tablet TAKE 1 TABLET BY MOUTH EVERY DAY 8/16/22   Ar Automatic Reconciliation   amLODIPine (NORVASC) 10 MG tablet Take 1 tablet by mouth once daily 8/9/22   Ar Automatic Reconciliation   apixaban (ELIQUIS) 2.5 MG TABS tablet Take 2.5 mg by mouth 2 times daily 7/27/22   Ar Automatic Reconciliation   calcitRIOL (ROCALTROL) 0.5 MCG capsule Take 0.5 mcg by mouth See Admin Instructions 7/19/21   Ar Automatic Reconciliation   carvedilol (COREG) 25 MG tablet Take 25 mg by mouth 2 times daily (with meals) 7/27/22   Ar Automatic Reconciliation   cinacalcet (SENSIPAR) 30 MG tablet Take 30 mg by mouth daily    Ar Automatic Reconciliation dexlansoprazole (DEXILANT) 60 MG CPDR delayed release capsule TAKE 1 CAPSULE BY MOUTH IN THE MORNING. 10/3/22   Ar Automatic Reconciliation   dicyclomine (BENTYL) 10 MG capsule Take 10 mg by mouth daily 7/27/22   Ar Automatic Reconciliation   doxercalciferol (HECTOROL) 4 MCG/2ML injection Infuse 6 mcg intravenously 3/25/22   Ar Automatic Reconciliation   EPINEPHrine (EPIPEN) 0.3 MG/0.3ML SOAJ injection INJECT 0.3 ML INTRAMUSCULARLY ONCE AS NEEDED FOR ALLERGIC RESPONSE 10/5/22   Ar Automatic Reconciliation   epoetin amando (EPOGEN;PROCRIT) 04024 UNIT/ML injection Inject 10,000 Units into the skin 3/28/22   Ar Automatic Reconciliation   estradiol (ESTRACE VAGINAL) 0.1 MG/GM vaginal cream INSERT 2 GRAMS INTO VAGINA EVERY MONDAY AND THURSDAY 4/11/17   Ar Automatic Reconciliation   famotidine (PEPCID) 20 MG tablet Take 20 mg by mouth daily 10/13/22   Ar Automatic Reconciliation   fluticasone (FLONASE) 50 MCG/ACT nasal spray USE 1 SPRAY IN EACH NOSTRIL EVERY MORNING 10/5/22   Ar Automatic Reconciliation   Hyoscyamine Sulfate SL 0.125 MG SUBL Place 0.125 mg under the tongue every 4 hours as needed 7/27/22   Ar Automatic Reconciliation   levothyroxine (SYNTHROID) 75 MCG tablet Take 75 mcg by mouth every morning (before breakfast) 7/27/22   Ar Automatic Reconciliation   losartan (COZAAR) 50 MG tablet Take 50 mg by mouth daily 8/26/22   Ar Automatic Reconciliation   meclizine (ANTIVERT) 25 MG tablet TAKE 1 TABLET THREE TIMES DAILY AS NEEDED FOR DIZZINESS 12/21/22   Ar Automatic Reconciliation   montelukast (SINGULAIR) 10 MG tablet Take 10 mg by mouth daily 7/27/22   Ar Automatic Reconciliation   ondansetron (ZOFRAN-ODT) 4 MG disintegrating tablet Take 8 mg by mouth every 8 hours as needed 11/28/22   Ar Automatic Reconciliation   polyethylene glycol (GLYCOLAX) 17 GM/SCOOP powder Take 17 g by mouth 2 times daily 9/14/22   Ar Automatic Reconciliation   predniSONE (DELTASONE) 5 MG tablet Take 1 tablet by mouth daily    Ar Automatic Reconciliation   sevelamer (RENVELA) 800 MG tablet Take 800 mg by mouth 3 times daily 6/8/21   Ar Automatic Reconciliation   simvastatin (ZOCOR) 20 MG tablet TAKE 1 TABLET BY MOUTH DAILY AS DIRECTED. 7/27/22   Ar Automatic Reconciliation   sucralfate (CARAFATE) 1 GM tablet TAKE 1 TABLET BY MOUTH FOUR TIMES DAILY 7/27/22   Ar Automatic Reconciliation   traZODone (DESYREL) 50 MG tablet Take 50 mg by mouth 11/3/22   Ar Automatic Reconciliation   valGANciclovir (VALCYTE) 450 MG tablet Take 900 mg by mouth daily 7/27/22   Ar Automatic Reconciliation       Hospital Medications:  Current Facility-Administered Medications   Medication Dose Route Frequency    0.9 % sodium chloride infusion   IntraVENous PRN    propofol injection  5-50 mcg/kg/min IntraVENous Continuous    succinylcholine (ANECTINE) injection 200 mg  200 mg IntraVENous Once    lidocaine 2000 mg in dextrose 5% 500 mL infusion  1 mg/min IntraVENous Continuous    arformoterol tartrate (BROVANA) nebulizer solution 15 mcg  15 mcg Nebulization BID    budesonide (PULMICORT) nebulizer suspension 500 mcg  0.5 mg Nebulization BID    albuterol (PROVENTIL) nebulizer solution 2.5 mg  2.5 mg Nebulization Q4H PRN    lidocaine 4 % external patch 1 patch  1 patch TransDERmal Daily    [Held by provider] prochlorperazine (COMPAZINE) injection 10 mg  10 mg IntraVENous Q6H PRN    [Held by provider] amiodarone (CORDARONE) tablet 200 mg  200 mg Oral Daily    [Held by provider] apixaban (ELIQUIS) tablet 2.5 mg  2.5 mg Oral BID    acetaminophen (TYLENOL) tablet 650 mg  650 mg Oral Q6H PRN    acetaminophen (TYLENOL) suppository 650 mg  650 mg Rectal Q6H PRN    levothyroxine (SYNTHROID) tablet 100 mcg  100 mcg Oral QAM AC    [Held by provider] albuterol sulfate HFA (PROVENTIL;VENTOLIN;PROAIR) 108 (90 Base) MCG/ACT inhaler 1 puff  1 puff Inhalation Q4H PRN    aluminum & magnesium hydroxide-simethicone (MAALOX) 200-200-20 MG/5ML suspension 10 mL  10 mL Oral Q6H PRN    carvedilol (COREG) tablet 25 mg  25 mg Oral BID WC    cinacalcet (SENSIPAR) tablet 30 mg  30 mg Oral Daily    pantoprazole (PROTONIX) tablet 40 mg  40 mg Oral QAM AC    losartan (COZAAR) tablet 50 mg  50 mg Oral Daily    montelukast (SINGULAIR) tablet 10 mg  10 mg Oral Daily    sevelamer (RENVELA) tablet 800 mg  800 mg Oral TID    glucose chewable tablet 16 g  4 tablet Oral PRN    dextrose bolus 10% 125 mL  125 mL IntraVENous PRN    Or    dextrose bolus 10% 250 mL  250 mL IntraVENous PRN    glucagon injection 1 mg  1 mg SubCUTAneous PRN    dextrose 10 % infusion   IntraVENous Continuous PRN    [Held by provider] ondansetron (ZOFRAN) injection 4 mg  4 mg IntraVENous Q6H PRN    [Held by provider] ondansetron (ZOFRAN) injection 4 mg  4 mg IntraVENous Q6H PRN    [Held by provider] mometasone-formoterol (DULERA) 200-5 MCG/ACT inhaler 2 puff  2 puff Inhalation BID       Social History:  Social History     Tobacco Use    Smoking status: Never    Smokeless tobacco: Never   Substance Use Topics    Alcohol use: No       Pt denies any history of drug use, blood transfusions. Family History:  Family History   Problem Relation Age of Onset    Diabetes Mother     Rheum Arthritis Mother     Hypertension Father     Diabetes Father     Thyroid Disease Sister     Colon Polyps Brother        Review of Systems:  A detailed 10 system ROS is obtained, with pertinent positives as listed above. All others are negative as far as what is obtainable. Diet: N.p.o.    Objective:     Physical Exam:  Vitals:  BP (!) 104/37   Pulse 83   Temp 98.9 °F (37.2 °C) (Axillary)   Resp 28   Ht 5' 1\" (1.549 m)   Wt 142 lb (64.4 kg)   SpO2 100%   BMI 26.83 kg/m² . Gen:  Pt is not alert, cooperative, no acute distress. She is sedated and intubated. Skin:  Extremities and face reveal no rashes. HEENT: Sclerae anicteric. Extra-occular muscles are intact. No oral ulcers. No abnormal pigmentation of the lips. The neck is supple.   Cardiovascular: Irregular rate and rhythm. No murmurs, gallops, or rubs. Respiratory:  Comfortable breathing with no accessory muscle use. Clear breath sounds anteriorly with no wheezes, rales, or rhonchi. GI:  Abdomen nondistended, soft, and nontender. Normal active bowel sounds. No enlargement of the liver or spleen. No masses palpable. Rectal:  Deferred  Musculoskeletal:  No pitting edema of the lower legs. Neurological:  Gross memory appears intact. Patient is alert and oriented. Psychiatric:  Mood appears appropriate with judgement intact. Lymphatic:  No cervical or supraclavicular adenopathy. Laboratory:      Sodium   Date Value Ref Range Status   03/07/2023 132 (L) 136 - 145 mmol/L Final     Potassium   Date Value Ref Range Status   03/07/2023 3.5 3.5 - 5.5 mmol/L Final     Chloride   Date Value Ref Range Status   03/07/2023 93 (L) 100 - 111 mmol/L Final     CO2   Date Value Ref Range Status   03/07/2023 27 21 - 32 mmol/L Final     BUN   Date Value Ref Range Status   03/07/2023 20 (H) 7 - 18 mg/dL Final     Creatinine   Date Value Ref Range Status   03/07/2023 4.25 (H) 0.60 - 1.30 mg/dL Final     GFR    Date Value Ref Range Status   09/12/2022 7 (L) >60 ml/min/1.73m2 Final     AST   Date Value Ref Range Status   03/01/2023 14 10 - 38 U/L Final     ALT   Date Value Ref Range Status   03/01/2023 14 13 - 56 U/L Final     Albumin   Date Value Ref Range Status   03/01/2023 3.0 (L) 3.4 - 5.0 g/dL Final     Globulin   Date Value Ref Range Status   03/01/2023 4.3 (H) 2.0 - 4.0 g/dL Final     WBC   Date Value Ref Range Status   03/02/2023 6.8 4.6 - 13.2 K/uL Final     RBC   Date Value Ref Range Status   03/02/2023 3.27 (L) 4.20 - 5.30 M/uL Final     Hemoglobin   Date Value Ref Range Status   03/07/2023 5.7 (LL) 12.0 - 16.0 g/dL Final     Comment:     CALLED TO AND READ BACK BY  MICHELLE MUÑIZ ICU ON [id number]@ [id number] EPS.      Hematocrit   Date Value Ref Range Status   03/07/2023 18.5 (L) 35.0 - 45.0 % Final MCHC   Date Value Ref Range Status   03/07/2023 30.8 (L) 31.0 - 37.0 g/dL Final     MCV   Date Value Ref Range Status   03/02/2023 96.3 78.0 - 100.0 FL Final     RDW   Date Value Ref Range Status   03/02/2023 18.6 (H) 11.6 - 14.5 % Final     Platelets   Date Value Ref Range Status   03/02/2023 243 135 - 420 K/uL Final           Lab Results   Component Value Date    ALT 14 03/01/2023    AST 14 03/01/2023    ALKPHOS 221 (H) 03/01/2023    BILITOT 0.6 03/01/2023       Lab Results   Component Value Date    LIPASE 86 03/01/2023           CAT Scan Result (most recent):  CT HEAD WO CONTRAST 03/02/2023    Narrative  EXAM: CT HEAD WO CONTRAST    INDICATION: altered mental status    COMPARISON: 5/19/2021. CONTRAST: None. TECHNIQUE: Unenhanced CT of the head was performed using 5 mm images. Brain and  bone windows were generated. Coronal and sagittal reformats. CT dose reduction  was achieved through use of a standardized protocol tailored for this  examination and automatic exposure control for dose modulation. FINDINGS:  The ventricles and sulci are normal in size, shape and configuration. There is  no significant white matter disease. There is no intracranial hemorrhage,  extra-axial collection, or mass effect. The basilar cisterns are open. No CT  evidence of acute infarct. The bone windows demonstrate no abnormalities. The visualized portions of the  paranasal sinuses and mastoid air cells are clear. Impression  No acute intracranial abnormality. MRI Result (most recent):  No results found for this or any previous visit from the past 3650 days. US Result (most recent):  US GALLBLADDER RUQ 08/29/2022    Narrative  EXAM: Limited right upper quadrant abdominal ultrasound    INDICATION: Increasing abdominal pain. COMPARISON: Abdominal CT 08/28/2022    TECHNIQUE: Real-time sonography in multiple planes of the right upper quadrant  was performed with image documentation.  Grayscale, color flow Doppler imaging,  and velocity spectral waveform analysis of the portal vein was performed (duplex  imaging). _______________    FINDINGS:    LIVER: The liver is within normal limits in echogenicity, without focal mass. Liver is normal in size, measuring 14 cm. Color flow Doppler and velocity  spectral waveform analysis of the portal vein shows normal (hepatopedal)  direction of flow. BILIARY SYSTEM: No intrahepatic biliary dilatation. Common bile duct is normal  in caliber, measuring 5 mm. GALLBLADDER: Cholecystectomy. TRANSPLANT RIGHT ILIAC FOSSA KIDNEY: Right kidney is small in size, measuring 8  cm in length. No hydronephrosis. No solid renal mass. Simple appearing cysts,  largest measuring up to 2 cm. No visible calculi. Increased cortical  echogenicity with cortical thinning typically seen with medical renal disease. PANCREAS: Head and body are unremarkable in appearance though the tail is  obscured by overlying bowel gas. IVC: Visualized portions are normal in caliber and patent. OTHER: No free intraperitoneal fluid.    _______________    Impression  1. Cholecystectomy. 2. No acute abnormality. Assessment:     Nausea and vomiting. She is on medication as an outpatient for nausea and vomiting. I highly suspect her nausea and vomiting has nothing to do with her digestive system. I feel is multifactorial in nature. Contributing factors include her kidney disease with uremia, hyponatremia, depression, medications including amiodarone use, possible intermittent small bowel mesenteric ischemia given her arrhythmias, and other non-GI factors. She is clearly too ill to undergo any endoscopic intervention and this is not an acute emergency regardless. Has had her gallbladder removed and had upper endoscopy 11/2020 which showed a hiatal hernia and gastritis. Rectal bleeding. She has had a colonoscopy as recently as 5/2022.   This showed diverticulosis which is significant and large internal hemorrhoids. I suspect her rectal bleeding over the years is due to aggravation of her internal hemorrhoids and not due to any pathology. The present episode of rectal bleeding may be due to hypotension from her ventricular tachycardia resulting in ischemic colitis. No, she has had I suspect ischemic colitis in the past based on her prior history. Please see 5/26/2022 office notes for details. Ventricular tachycardia. She is intubated and on medication because of the new onset of this problem and her prognosis is guarded. This plus her other problems prevent her from being a candidate for any anesthesia or endoscopic intervention at this time unless she has a life-threatening emergency. Paroxysmal atrial fibrillation. She has been seen in the past by cardiology and on amiodarone. Weight loss. This is multifactorial in nature and due to her kidney failure, hypothyroidism, and depression. Anticoagulation use. She is on Eliquis. Failure to thrive. Again I feel this is multifactorial in nature and not due to a specific GI etiology as above. Renal failure. The patient is on hemodialysis 3 times a week and also is on medication for treatment. She has a failed kidney transplant. Anemia. Her hemoglobin/hematocrit are 5.7 and 18. She has transfusions ordered. This is likely decreased from her anemia of chronic disease induced anemia due to the bleeding that occurred. Again, I suspect since she has had ischemic colitis in the past, this likely is what happened this time as well due to hypotension and ventricular tachycardia and anticoagulation use. Plan:     There are no plans for any endoscopic intervention or anesthesia at this time as this could very potentially cause the patient's demise and nausea and vomiting is not an acute problem for this patient and she has other reasons that would explain this. This is also true as an outpatient.   We could entertain repeat EGD as an outpatient once she is cleared from a cardiac standpoint. However again at this time this is much too high risk to undertake. There was a question of a gastric emptying study. This could be done but very well is likely positive and would be a false positive since renal failure, some of her medications, and her overall general debility from multiple medical problems all can cause delayed emptying of your stomach. It would not change her prognosis, or treatment, and she is not a candidate for either Reglan or erythromycin because of their arrhythmogenic effects which is not appropriate in this patient with multiple arrhythmias. Continue present care otherwise per the hospitalist team.  Further recommendations pending her clinical course. Thank you very much for this consultation. As I have no other suggestions, I will sign off at this time. Please not hesitate to contact me if any questions. Josefa Duncan MD      On this date, 3/7/2023,  I have spent > 45 minutes reviewing previous notes, test results, records information,  and face to face time with the patient for interview/exam, discussing working diagnosis and treatment plan and any testing recommended, as well as documenting on the day of the visit.

## 2023-03-07 NOTE — PROGRESS NOTES
attempted to visit  with Jose James, who is a 74 y.o., female. The reason patientcame to hospital is:   Patient Active Problem List    Diagnosis Date Noted    Polymorphic ventricular tachycardia 03/06/2023    Hypomagnesemia 03/06/2023    Chronic kidney disease with end stage renal failure on dialysis [facility]) 03/02/2023    Hypoglycemia 02/14/2023    Hyperkalemia 11/21/2022    Volume overload 11/21/2022    ESRD needing dialysis ([facility] [address] 11/21/2022    Severe protein-calorie malnutrition ([facility] [address] 09/11/2022    Stenosis of left vertebral artery 04/12/2022    Chronic anticoagulation 05/06/2021    Atrial fibrillation ([facility] [address] 05/06/2021    Stomatitis 03/02/2021    ESRD (end stage renal disease) on dialysis ([facility] [address] 12/28/2020    History of DVT (deep vein thrombosis) 12/28/2020    Hypothyroidism 12/24/2020    Vertigo 12/24/2020    GERD (gastroesophageal reflux disease)     Diverticulitis of intestine 04/02/2016    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    Anemia 11/10/2014    Drug-induced hyperglycemia 06/28/2014    Kidney transplant rejection 04/10/2014    Essential hypertension 08/21/2013    Hyperlipidemia 08/21/2013    Asthma 08/21/2013    Seasonal allergic rhinitis due to pollen 08/21/2013    CMV (cytomegalovirus) antibody positive 04/30/2012    History of kidney transplant 04/30/2012    Migraine without status migrainosus, not intractable 02/24/2010   . Patient is unable to communicate at this time.  offered prayer near bedside with the family and offered availability and left Spiritual Care brochure. Patient intubated. Chaplains will continue to follow and will provide pastoral care on an as needed/requested basis.     [address] [facility]   Staff [address] [facility]   [phone]

## 2023-03-07 NOTE — PROGRESS NOTES
0700 Bedside shift change report given to CLAUDIO Rojas RN (oncoming nurse) by DANA Bishop RN (offgoing nurse). Report included the following information Nurse Handoff Report, Intake/Output, MAR, Recent Results, Med Rec Status, and Cardiac Rhythm NSR,PAC,prolonged QT interval . Pt resting in bed with eyes closed. Pt arouse to stimuli and able to follow commands. Pt nods appropriately to yes/no questions. Restraints in place with no signs of injury or abrasions. Propofol gtt verified and infusing with no difficulty. CBWR, 2 side rails up and bed locked in lowest position. 0915 Pt resting in bed with eyes closed. Pt shows no signs of distress and has no c/o pain at this time. CBWR, 2 side rails up and bed locked in lowest position. 1215 Pt extubated with no difficulty. Pt on 2LNC with O2 sats @100%. Pt changed of incontinent bowel which appeared to be dark red blood with clots. Dr. Miki Valadez notified. No new orders given. Pt resting in bed with family at bedside. CBWR, 2 side rails up and bed locked in lowest position. 1300 Pt has c/o chest pain and soreness. TWYLA Gutierrez NP notified. No new orders given. CPR performed on pt last night before intubation. CBWR, 2 side rails up and bed locked in lowest position. 1900 Bedside shift change report given to DANA Bishop RN (oncoming nurse) by CLAUDIO Rojas RN (offgoing nurse). Report included the following information Nurse Handoff Report, Adult Overview, Intake/Output, MAR, Recent Results, Med Rec Status, and Cardiac Rhythm NSR,PAC,Prolonged QT .

## 2023-03-07 NOTE — PROGRESS NOTES
Pt extubated at 1158 per Dr. Salazar Drilling verbal order. Dr. Citlalli Palacios, RN, and pt family members present in room. Pt had a cuff leak and cough and gag. Pt also following commands. Pt placed on 2L NC post extubation. No stridor noted. HR-84, RR-30, 100% SAT.

## 2023-03-07 NOTE — PROGRESS NOTES
Pt intubated by  using 7.5 ETT. BBS heard upon auscultation, positive color change displayed via CO2 detector. ETT secured at 22 lipline via tube abad. Pt placed on mechanical ventilation (see flowsheet). Pt tolerating well. NAD. Will continue to monitor.

## 2023-03-07 NOTE — PROGRESS NOTES
Patient with acute GI bleed with low blood pressure, stat HH checked while awaiting GI consult. Patient [facility] shown below. Stat order to allocate 2 units and transfused one unit. Continue HH every 6 hours once blood transfused.     Latest Reference Range & Units 3/7/23 16:38   Hemoglobin Quant 12.0 - 16.0 g/dL 5.7 (LL)   Hematocrit 35.0 - 45.0 % 18.5 (L)

## 2023-03-07 NOTE — PROGRESS NOTES
CARDIOLOGY PROGRESS NOTE - NP    Patient seen and examined. This is a patient who is followed for atrial flutter with RVR. She had been NSR on exam yesterday morning, appears to have gone back in the AFIB during dialsysis. Qtc prolongation noted. Prompting us to hold amiodarone and zofran. Overnight having recurrent runs of polymorphic VT with defibrillation. Intubated. Treated w/ IV magnesium. Currently sedated. Currently in sinus rhythm. Telemetry reviewed. Pertinent review of systems items noted above, all other systems are negative. Current medications reviewed. Physical Examination  Vital signs are stable. Blood pressure 90/42, Pulse 91  No apparent distress. Heart is regular, rate and rhythm. Normal S1, S2, harsh murmur. Lungs are clear bilaterally. Abdomen is soft, nontender, normal bowel sounds. Extremities have no edema. Labs reviewed: , K 3.5, Magnesium 3.0, Crt 4.25      Case discussed with Dr. Ashley Vaca and our impression and recommendations are as follows:  Atrial flutter with RVR:  Currently SR. Off amiodarone due to prolonged Qtc. Continue Eliquis 2.5mg twice daily. Echo with preserved EF, grade 2 diastolic dysfunction. No known non-compliance with DOAC. TSH 13.9. Continue supplementation and have her follow-up with her primary. Altered mental status: Head CT and labs unremarkable. Mental acuity had been improving, currently intubated and sedated. No known missed doses of Eliquis. Per Care Everywhere she had a cardiac cath in 2020 that showed tortuous vessels but no significant CAD. Hypertension:  Soft due to sedation. Continue to monitor. Polymorphic Vtach: likely due to prolonged Qtc, improving. Review of old cardiac records listed prolonged QT previously. Given QT prolonging meds had already been stopped and electrolytes noncontributory, would likely benefit from [facility] to evaluate for CAD as etiology. If no CAD, will likely need ICD.   Spoke to daughter, Leigh Lovett regarding transfer for [facility] and EP evaluation. They would like to see how she does after extubation then decide the next steps. Please do not hesitate to call me or Dr. Kaleb Barrera if additional questions arise.

## 2023-03-07 NOTE — PROGRESS NOTES
Hospitalist Progress Note             Date of Service:  6/3/3200  NAME:  Calli Barrera  :  [id number]  MRN:  [id number]      Admission Summary:   Calli Barrera is a 74 y.o. female  with ESRD HD, failed renal transplant, HTN, HLP, Hypothyroididsm, and CMV+, who represented herself to the ED today with c/o nausea, vomit, abdominal cramps x 3 days, No missed HD, states HD series is q MF only at Dr Alka Pool [email or web address] Was here in ED 3 days ago requesting admit for same complaints but was discharged home, she has not been eating, she appears withdrawn, Hx is limited because she appears withdrawn, not wanting to communicate, or partake in her care, she states she has been Just nauseous, no chest pain, sob, fever or chills, no other complaints voiced. Last BM was this morning and loose. Hospitalist was asked to admit.       Interval history / Subjective:     Pt intubated late last night after 2 cardiac arrests     Assessment & Plan:     Critical care note  Critical care time 40 minutes  Cardiac arrest due to V Tach  - pt is intubated due to cardiac arrest x 2 yesterday, due to what looks like polymorphic VT  - pt started having peaked twaves, and more prlonged qtc interval earlier in the day, then had bp on lower side during HD  - shortly after HD is when events occurred, 1st resolved with precordial thump, 2nd required defibrillator, followed by intubation  - this am pt on minimal vent settings, fio2 40%, peep 5, we will await cardiology input prior to extubation  - electrolytes reviewed, mag is now elevated after aggressive replacement, and K is 3.5 which is wnl and would not want to encourage hyperkalemia in an HD pt  - we have iv lidocaine ready for administration if VT were to recurr but pt quite stable from cardiac stand point at this time  - she will need cardiac cath in the future  - propofol for sedation, currently comfortable with RASS 0    metabolic encephalopathy  - had improved as of yesterday     Atrial Fibrillation:  - secondary to the patient not taking her medication due uremic anorexia and vomiting  -transferred to ICU for closer monitor, starting IV Amiodarone  - Converted to NSR, transition to PO after HD and her vomiting improved   - po amio stopped due to prolonged qtc interval  - cont eliquis, coreg if bp tolerates      Intractable nausea, vomit, abdominal cramps  - will benefit from gastric emptying study when stable, and follow up with GI      ESRD  -nephrologist for HD     Hypertension:  -holding oral medications for now due to mental status  -PRN Hydralazine   Depression:  -not wanting to eat, or drink, or get oob,  Non-suicidal.  -fu pcp, consider ssri and psych eval    Hypothyroidism  -current TSH 13.90, increase from most recent TSH of 12.52  -synthroid increased to 100mcg during this admission    Hyponatremia  - improved after HD yesterday    NON-COMPLIANCE   - with medications and HD - educated and counseled      Full Code   DVT prophylaxis: pharmacologic and mechanical    Updated daughter by phone 1010am    Review of Systems:   Pertinent items are noted in HPI. Vital Signs:    Last 24hrs VS reviewed since prior progress note. Most recent are:  Vitals:    03/07/23 0757   BP: (!) 90/42   Pulse: 91   Resp: 15   Temp: 99.7 °F (37.6 °C)   SpO2: 100%         Intake/Output Summary (Last 24 hours) at 3/7/2023 0955  Last data filed at 3/6/2023 1910  Gross per 24 hour   Intake 620 ml   Output 2493 ml   Net -1873 ml        Physical Examination:             General:          intubated but follows commands  HEENT:           Atraumatic, anicteric sclerae, pink conjunctivae                          No oral ulcers, mucosa moist, throat clear, dentition fair  Neck:               Supple, symmetrical  Lungs:             Clear to auscultation bilaterally. No Wheezing or Rhonchi. No rales.   Chest wall:      No tenderness  No Accessory muscle use. Heart:              Regular  rhythm,  No  murmur   No edema  Abdomen:        Soft, non-tender. Not distended. Bowel sounds normal  Extremities:     No cyanosis. No clubbing,                            Skin turgor normal, Capillary refill normal  Skin:                Not pale. Not Jaundiced  No rashes   Psych:             Not anxious or agitated. Neurologic:      moves all extremities,        Data Review:    Review and/or order of clinical lab test  Review and/or order of tests in the radiology section of CPT  Review and/or order of tests in the medicine section of CPT      Labs:   No results for input(s): WBC, HGB, HCT, PLT in the last 72 hours. Recent Labs     03/06/23  0419 03/06/23 2017 03/07/23  0253   * 134* 132*   K 4.2 4.0 3.5   CL 86* 96* 93*   CO2 26 29 27   BUN 29* 14 20*   MG  --  1.9 3.0*     No results for input(s): ALT, TP, ALB, GLOB, GGT, AML in the last 72 hours. Invalid input(s): SGOT, GPT, AP, TBIL, TBILI, AMYP, LPSE, HLPSE  No results for input(s): INR, APTT in the last 72 hours. Invalid input(s): PTP   No results for input(s): TIBC, FERR in the last 72 hours. Invalid input(s): FE, PSAT   No results found for: FOL, RBCF   No results for input(s): PH, PCO2, PO2 in the last 72 hours. No results for input(s): CPK in the last 72 hours.     Invalid input(s): CPKMB, CKNDX, TROIQ  Lab Results   Component Value Date/Time    CHOL 129 01/20/2022 03:46 PM    HDL 76 01/20/2022 03:46 PM     No results found for: GLUCPOC  [unfilled]      Medications Reviewed:     Current Facility-Administered Medications   Medication Dose Route Frequency    propofol injection  5-50 mcg/kg/min IntraVENous Continuous    succinylcholine (ANECTINE) injection 200 mg  200 mg IntraVENous Once    lidocaine 2000 mg in dextrose 5% 500 mL infusion  1 mg/min IntraVENous Continuous    arformoterol tartrate (BROVANA) nebulizer solution 15 mcg  15 mcg Nebulization BID    budesonide (PULMICORT) nebulizer suspension 500 mcg  0.5 mg Nebulization BID    albuterol (PROVENTIL) nebulizer solution 2.5 mg  2.5 mg Nebulization Q4H PRN    lidocaine 4 % external patch 1 patch  1 patch TransDERmal Daily    [Held by provider] prochlorperazine (COMPAZINE) injection 10 mg  10 mg IntraVENous Q6H PRN    [Held by provider] amiodarone (CORDARONE) tablet 200 mg  200 mg Oral Daily    apixaban (ELIQUIS) tablet 2.5 mg  2.5 mg Oral BID    acetaminophen (TYLENOL) tablet 650 mg  650 mg Oral Q6H PRN    acetaminophen (TYLENOL) suppository 650 mg  650 mg Rectal Q6H PRN    levothyroxine (SYNTHROID) tablet 100 mcg  100 mcg Oral QAM AC    [Held by provider] albuterol sulfate HFA (PROVENTIL;VENTOLIN;PROAIR) 108 (90 Base) MCG/ACT inhaler 1 puff  1 puff Inhalation Q4H PRN    aluminum & magnesium hydroxide-simethicone (MAALOX) 200-200-20 MG/5ML suspension 10 mL  10 mL Oral Q6H PRN    carvedilol (COREG) tablet 25 mg  25 mg Oral BID WC    cinacalcet (SENSIPAR) tablet 30 mg  30 mg Oral Daily    pantoprazole (PROTONIX) tablet 40 mg  40 mg Oral QAM AC    losartan (COZAAR) tablet 50 mg  50 mg Oral Daily    montelukast (SINGULAIR) tablet 10 mg  10 mg Oral Daily    sevelamer (RENVELA) tablet 800 mg  800 mg Oral TID    glucose chewable tablet 16 g  4 tablet Oral PRN    dextrose bolus 10% 125 mL  125 mL IntraVENous PRN    Or    dextrose bolus 10% 250 mL  250 mL IntraVENous PRN    glucagon injection 1 mg  1 mg SubCUTAneous PRN    dextrose 10 % infusion   IntraVENous Continuous PRN    [Held by provider] ondansetron (ZOFRAN) injection 4 mg  4 mg IntraVENous Q6H PRN    [Held by provider] ondansetron (ZOFRAN) injection 4 mg  4 mg IntraVENous Q6H PRN    [Held by provider] mometasone-formoterol (DULERA) 200-5 MCG/ACT inhaler 2 puff  2 puff Inhalation BID     ______________________________________________________________________  EXPECTED LENGTH OF STAY: [unfilled]  ACTUAL LENGTH OF STAY:          5                 Holly Gipson MD

## 2023-03-07 NOTE — PROGRESS NOTES
Progress Note  Date:3/6/2023       [facility] (formerly [facility]):831/39  Patient Laura Null     YOB: 1955     Age:67 y.o. Subjective    The patient is a 71-year-old -American female who was admitted for generalized fatigue with fever, chills and weakness. She had some nausea and vomiting that started 2 days ago proximate to dialysis. She has been unable to keep anything down. As p the patient art of her evaluation she was found to have a prolonged QT that was worsening and trend from 500, to 600 to now over 700. Just at shift change. Patient actually coded briefly with about a 7-second run of all amorphic V. tach. As this was witnessed a precordial thump was initiated and cardiac rhythm returned. About 20 minutes later another run of V. tach occurred that required defibrillation to return a rhythm. Contacted cardiology who reviewed the rhythm strips confirming polymorphic V. tach. Requested we administered 2 g of magnesium and repeat mag stat. That we follow the electrolytes closely to include potassium and magnesium. He additionally advised that we should have at the ready a lidocaine drip that would be initiated if another run of V. tach occurs with 100 mg bolus followed by 1 mg/min.       Objective           Vitals Last 24 Hours:  Patient Vitals for the past 24 hrs:   Temp Pulse Resp BP SpO2   03/06/23 2011 97.7 °F (36.5 °C) 90 24 (!) 142/62 --   03/06/23 1910 97.7 °F (36.5 °C) 83 18 (!) 156/69 --   03/06/23 1903 -- -- -- -- 96 %   03/06/23 1900 -- 82 -- 134/60 --   03/06/23 1845 -- 83 -- (!) 109/58 --   03/06/23 1830 -- 82 -- (!) 110/56 --   03/06/23 1820 -- 82 -- (!) 119/58 --   03/06/23 1815 -- 81 -- (!) 103/54 --   03/06/23 1800 -- 81 -- 107/60 --   03/06/23 1745 -- 78 -- (!) 110/54 --   03/06/23 1730 -- 76 -- (!) 139/59 --   03/06/23 1715 -- 73 -- (!) 115/51 --   03/06/23 1700 -- 72 -- (!) 105/47 --   03/06/23 1645 -- 71 -- (!) 124/53 --   03/06/23 1631 97.7 °F (36.5 °C) 69 19 (!) 139/56 99 %   03/06/23 1630 -- 70 -- (!) 139/56 --   03/06/23 1615 -- 70 -- (!) 127/49 --   03/06/23 1609 -- 57 -- (!) 128/56 --   03/06/23 1600 (!) 96.5 °F (35.8 °C) 57 18 (!) 132/59 --   03/06/23 1125 98.3 °F (36.8 °C) 72 17 135/62 100 %   03/06/23 0730 (!) 100.7 °F (38.2 °C) 81 17 134/62 97 %   03/06/23 0423 99.6 °F (37.6 °C) 81 22 (!) 124/54 --   03/06/23 0015 98.4 °F (36.9 °C) 90 20 (!) 152/59 --        Intake and Output:  Current Shift: 03/06 1901 - 03/07 0700  In: 500   Out: 2493   Last three shifts:   03/05 0701 - 03/06 1900  In: 2647.5 [P.O.:220; I.V.:2427.5]  Out: -     Physical Exam:  General Appearance:  Comfortable, well-appearing. No acute distress. HEENT: NCAT, PERRL, No deformities or discharge, Neck supple with trachea midline. Respiratory: Normal respiratory rate. Breath sounds clear to auscultation. Heart: S1 normal and S2 normal.  No tachycardia  Abdomen: Soft and flat. Bowel sounds are normal. No rebound or guarding. No organomegaly. Extremities: Normal range of motion. No acute deformities. No peripheral edema. Pulses: Distal pulses are intact. Neurological: Alert and oriented to person, place and time. Grossly intact. Skin:  Warm and dry. No acute lesions.     Medications           Current Facility-Administered Medications   Medication Dose Route Frequency    etomidate (AMIDATE) 2 MG/ML injection        propofol injection  5-50 mcg/kg/min IntraVENous Continuous    succinylcholine (ANECTINE) injection 200 mg  200 mg IntraVENous Once    magnesium sulfate 2000 mg in 50 mL IVPB premix  2,000 mg IntraVENous Once    lidocaine 2000 mg in dextrose 5% 500 mL infusion  1 mg/min IntraVENous Continuous    [START ON 3/7/2023] arformoterol tartrate (BROVANA) nebulizer solution 15 mcg  15 mcg Nebulization BID    [START ON 3/7/2023] budesonide (PULMICORT) nebulizer suspension 500 mcg  0.5 mg Nebulization BID    albuterol (PROVENTIL) nebulizer solution 2.5 mg  2.5 mg Nebulization Q4H PRN lidocaine 4 % external patch 1 patch  1 patch TransDERmal Daily    [Held by provider] prochlorperazine (COMPAZINE) injection 10 mg  10 mg IntraVENous Q6H PRN    [Held by provider] amiodarone (CORDARONE) tablet 200 mg  200 mg Oral Daily    apixaban (ELIQUIS) tablet 2.5 mg  2.5 mg Oral BID    acetaminophen (TYLENOL) tablet 650 mg  650 mg Oral Q6H PRN    acetaminophen (TYLENOL) suppository 650 mg  650 mg Rectal Q6H PRN    levothyroxine (SYNTHROID) tablet 100 mcg  100 mcg Oral QAM AC    [Held by provider] albuterol sulfate HFA (PROVENTIL;VENTOLIN;PROAIR) 108 (90 Base) MCG/ACT inhaler 1 puff  1 puff Inhalation Q4H PRN    aluminum & magnesium hydroxide-simethicone (MAALOX) 200-200-20 MG/5ML suspension 10 mL  10 mL Oral Q6H PRN    carvedilol (COREG) tablet 25 mg  25 mg Oral BID WC    cinacalcet (SENSIPAR) tablet 30 mg  30 mg Oral Daily    pantoprazole (PROTONIX) tablet 40 mg  40 mg Oral QAM AC    losartan (COZAAR) tablet 50 mg  50 mg Oral Daily    montelukast (SINGULAIR) tablet 10 mg  10 mg Oral Daily    sevelamer (RENVELA) tablet 800 mg  800 mg Oral TID    glucose chewable tablet 16 g  4 tablet Oral PRN    dextrose bolus 10% 125 mL  125 mL IntraVENous PRN    Or    dextrose bolus 10% 250 mL  250 mL IntraVENous PRN    glucagon injection 1 mg  1 mg SubCUTAneous PRN    dextrose 10 % infusion   IntraVENous Continuous PRN    [Held by provider] ondansetron (ZOFRAN) injection 4 mg  4 mg IntraVENous Q6H PRN    [Held by provider] ondansetron (ZOFRAN) injection 4 mg  4 mg IntraVENous Q6H PRN    [Held by provider] mometasone-formoterol (DULERA) 200-5 MCG/ACT inhaler 2 puff  2 puff Inhalation BID         Allergies         Aspirin, Bromfenac, Cefepime, Ceftriaxone, Copper, Ibuprofen, Ketorolac tromethamine, Nabumetone, Rifampin, Sulfamethoxazole-trimethoprim, and Vancomycin       Labs/Imaging/Diagnostics      Labs:       Trended key labs include:  No results for input(s): WBC, HGB, HCT, PLT in the last 72 hours.   Recent Labs 03/05/23  0415 03/06/23  0419 03/06/23 2017   * 124* 134*   K 4.1 4.2 4.0   CL 90* 86* 96*   CO2 29 26 29   BUN 19* 29* 14   MG  --   --  1.9       Imaging Last 24 Hours:      Assessment//Plan           Patient Active Problem List    Diagnosis Date Noted    Polymorphic ventricular tachycardia 03/06/2023    Hypomagnesemia 03/06/2023    Chronic kidney disease with end stage renal failure on dialysis [facility]) 03/02/2023    Hypoglycemia 02/14/2023    Hyperkalemia 11/21/2022    Volume overload 11/21/2022    ESRD needing dialysis ([facility] [address] 11/21/2022    Severe protein-calorie malnutrition ([facility] [address] 09/11/2022    Stenosis of left vertebral artery 04/12/2022    Chronic anticoagulation 05/06/2021    Atrial fibrillation ([facility] [address] 05/06/2021    Stomatitis 03/02/2021    ESRD (end stage renal disease) on dialysis ([facility] [address] 12/28/2020    History of DVT (deep vein thrombosis) 12/28/2020    Hypothyroidism 12/24/2020    Vertigo 12/24/2020    GERD (gastroesophageal reflux disease)     Diverticulitis of intestine 04/02/2016    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    Anemia 11/10/2014    Drug-induced hyperglycemia 06/28/2014    Kidney transplant rejection 04/10/2014    Essential hypertension 08/21/2013    Hyperlipidemia 08/21/2013    Asthma 08/21/2013    Seasonal allergic rhinitis due to pollen 08/21/2013    CMV (cytomegalovirus) antibody positive 04/30/2012    History of kidney transplant 04/30/2012    Migraine without status migrainosus, not intractable 02/24/2010        Hypomagnesemia      -2 g magnesium IV now Dr. Harmeet Carty with cardiology  -Repeat magnesium and follow closely      Polymorphic ventricular tachycardia      -Rhythm event strips sent to Dr. Harmeet Caryt   -Polymorphic in nature  -Hold amiodarone or Zofran  -Have at the ready a lidocaine drip with a 100 mg bolus and then 1 mg/min thereafter infusion with any next run of V.  Tach  -Allow for permissive tachycardia      Chronic kidney disease with end stage renal failure on dialysis ([facility] [address]      -Completed hemodialysis with approximately 2 L removed  -Sodium improved postdialysis      Hypoglycemia      -Continue to monitor closely for hypoglycemia          Code status: Full   Prophylaxis: Lovenox SQ    Critical care time 58 minutes     Electronically signed by Chapin Gilbert, PhD, PA-C on 3/6/2023 at 10:08 PM

## 2023-03-07 NOTE — PROGRESS NOTES
Patient had HD today and tolerated fine   UF was around 2 L , this was limited because of hypotension   Patient to be discharged today to follow with her OP dialysis unit   As mentioned earlier patient with sever compliance issue

## 2023-03-07 NOTE — PROCEDURES
I was called to patient's bedside in the ICU. Patient had had multiple episodes of V. tach. Patient had also had a brief period of CPR performed. Patient continue to have issues with definitive airway. I was asked to intubate the patient for security of airway. After obtaining verbal consent for the patient, patient was given etomidate 15 mg and succinylcholine 200 mg via IV. Patient was able to be bag-valve-mask until fasciculations were seen. At that time a MAC 4 blade was introduced into the oropharynx area into the vallecula. There was a grade 1 view of the vocal cords. At which time a 7.5 ET tube with stylette was then placed through the vocal cords under direct visualization. Stylette was removed. Breath sounds were auscultated in bilateral upper lung fields. There was good color extremity change. There was symmetric rise of the chest.  The tube was placed at the right side lip at 20 cm. Pending chest x-ray shows appropriate positioning of the ET tube.   Further management the patient was then turned back over to the ICU team.

## 2023-03-08 ENCOUNTER — APPOINTMENT (OUTPATIENT)
Age: 68
DRG: 682 | End: 2023-03-08
Payer: MEDICARE

## 2023-03-08 LAB
ANION GAP SERPL CALC-SCNC: 11 MMOL/L (ref 3–18)
ARTERIAL PATENCY WRIST A: YES
BASE EXCESS BLDA CALC-SCNC: 4.3 MMOL/L (ref 0–3)
BASOPHILS # BLD: 0 K/UL (ref 0–0.1)
BASOPHILS NFR BLD: 0 % (ref 0–2)
BDY SITE: ABNORMAL
BREATHS.SPONTANEOUS ON VENT: 18
BUN SERPL-MCNC: 35 MG/DL (ref 7–18)
BUN/CREAT SERPL: 6 (ref 12–20)
CA-I BLD-MCNC: 7.2 MG/DL (ref 8.5–10.1)
CHLORIDE SERPL-SCNC: 98 MMOL/L (ref 100–111)
CO2 SERPL-SCNC: 25 MMOL/L (ref 21–32)
COHGB MFR BLD: 1 % (ref 1–2)
CREAT SERPL-MCNC: 5.98 MG/DL (ref 0.6–1.3)
DIFFERENTIAL METHOD BLD: ABNORMAL
EOSINOPHIL # BLD: 0 K/UL (ref 0–0.4)
EOSINOPHIL NFR BLD: 0 % (ref 0–5)
ERYTHROCYTE [DISTWIDTH] IN BLOOD BY AUTOMATED COUNT: 17.2 % (ref 11.6–14.5)
GLUCOSE BLD STRIP.AUTO-MCNC: 118 MG/DL (ref 70–110)
GLUCOSE BLD STRIP.AUTO-MCNC: 76 MG/DL (ref 70–110)
GLUCOSE BLD STRIP.AUTO-MCNC: 78 MG/DL (ref 70–110)
GLUCOSE BLD STRIP.AUTO-MCNC: 78 MG/DL (ref 70–110)
GLUCOSE BLD STRIP.AUTO-MCNC: 89 MG/DL (ref 70–110)
GLUCOSE BLD STRIP.AUTO-MCNC: 90 MG/DL (ref 70–110)
GLUCOSE SERPL-MCNC: 89 MG/DL (ref 74–99)
HBV SURFACE AB SER QL: NONREACTIVE
HBV SURFACE AB SER-ACNC: <3.1 MIU/ML
HCO3 BLDA-SCNC: 27 MMOL/L (ref 22–26)
HCT VFR BLD AUTO: 27.2 % (ref 35–45)
HGB BLD-MCNC: 9 G/DL (ref 12–16)
IMM GRANULOCYTES # BLD AUTO: 0 K/UL
IMM GRANULOCYTES NFR BLD AUTO: 0 %
LACTATE SERPL-SCNC: 2 MMOL/L (ref 0.4–2)
LYMPHOCYTES # BLD: 0.6 K/UL (ref 0.9–3.6)
LYMPHOCYTES NFR BLD: 6 % (ref 21–52)
MAGNESIUM SERPL-MCNC: 2.6 MG/DL (ref 1.6–2.6)
MCH RBC QN AUTO: 31.1 PG (ref 24–34)
MCHC RBC AUTO-ENTMCNC: 33.1 G/DL (ref 31–37)
MCV RBC AUTO: 94.1 FL (ref 78–100)
METHGB MFR BLD: 0 % (ref 0–1.4)
MONOCYTES # BLD: 2.2 K/UL (ref 0.05–1.2)
MONOCYTES NFR BLD: 22 % (ref 3–10)
NEUTS BAND NFR BLD MANUAL: 1 % (ref 0–5)
NEUTS SEG # BLD: 7.2 K/UL (ref 1.8–8)
NEUTS SEG NFR BLD: 71 % (ref 40–73)
NRBC # BLD: 0 K/UL (ref 0–0.01)
NRBC BLD-RTO: 0 PER 100 WBC
OXYHGB MFR BLD: 90.2 % (ref 95–99)
PCO2 BLDA: 35 MMHG (ref 35–45)
PERFORMED BY:: ABNORMAL
PERFORMED BY:: ABNORMAL
PERFORMED BY:: NORMAL
PH BLDA: 7.52 (ref 7.35–7.45)
PHOSPHATE SERPL-MCNC: 2.4 MG/DL (ref 2.5–4.9)
PLATELET # BLD AUTO: 236 K/UL (ref 135–420)
PMV BLD AUTO: 9.9 FL (ref 9.2–11.8)
PO2 BLDA: 53 MMHG (ref 80–100)
POTASSIUM SERPL-SCNC: 4 MMOL/L (ref 3.5–5.5)
RBC # BLD AUTO: 2.89 M/UL (ref 4.2–5.3)
RBC MORPH BLD: ABNORMAL
RBC MORPH BLD: ABNORMAL
SAO2 % BLD: 91 % (ref 95–99)
SAO2% DEVICE SAO2% SENSOR NAME: ABNORMAL
SERVICE CMNT-IMP: ABNORMAL
SODIUM SERPL-SCNC: 134 MMOL/L (ref 136–145)
SPECIMEN SITE: ABNORMAL
TROPONIN I SERPL HS-MCNC: 44 NG/L (ref 0–54)
WBC # BLD AUTO: 10 K/UL (ref 4.6–13.2)

## 2023-03-08 PROCEDURE — 83735 ASSAY OF MAGNESIUM: CPT

## 2023-03-08 PROCEDURE — 87040 BLOOD CULTURE FOR BACTERIA: CPT

## 2023-03-08 PROCEDURE — 6370000000 HC RX 637 (ALT 250 FOR IP): Performed by: INTERNAL MEDICINE

## 2023-03-08 PROCEDURE — 2700000000 HC OXYGEN THERAPY PER DAY

## 2023-03-08 PROCEDURE — 84484 ASSAY OF TROPONIN QUANT: CPT

## 2023-03-08 PROCEDURE — 6360000002 HC RX W HCPCS: Performed by: NURSE PRACTITIONER

## 2023-03-08 PROCEDURE — 2000000000 HC ICU R&B

## 2023-03-08 PROCEDURE — 71045 X-RAY EXAM CHEST 1 VIEW: CPT

## 2023-03-08 PROCEDURE — 83605 ASSAY OF LACTIC ACID: CPT

## 2023-03-08 PROCEDURE — 84100 ASSAY OF PHOSPHORUS: CPT

## 2023-03-08 PROCEDURE — 1100000000 HC RM PRIVATE

## 2023-03-08 PROCEDURE — 82803 BLOOD GASES ANY COMBINATION: CPT

## 2023-03-08 PROCEDURE — 36430 TRANSFUSION BLD/BLD COMPNT: CPT

## 2023-03-08 PROCEDURE — 94761 N-INVAS EAR/PLS OXIMETRY MLT: CPT

## 2023-03-08 PROCEDURE — 6370000000 HC RX 637 (ALT 250 FOR IP): Performed by: NURSE PRACTITIONER

## 2023-03-08 PROCEDURE — 85025 COMPLETE CBC W/AUTO DIFF WBC: CPT

## 2023-03-08 PROCEDURE — 2580000003 HC RX 258: Performed by: NURSE PRACTITIONER

## 2023-03-08 PROCEDURE — 99231 SBSQ HOSP IP/OBS SF/LOW 25: CPT | Performed by: INTERNAL MEDICINE

## 2023-03-08 PROCEDURE — 82962 GLUCOSE BLOOD TEST: CPT

## 2023-03-08 PROCEDURE — 36600 WITHDRAWAL OF ARTERIAL BLOOD: CPT

## 2023-03-08 PROCEDURE — 93005 ELECTROCARDIOGRAM TRACING: CPT | Performed by: NURSE PRACTITIONER

## 2023-03-08 PROCEDURE — 94640 AIRWAY INHALATION TREATMENT: CPT

## 2023-03-08 PROCEDURE — P9016 RBC LEUKOCYTES REDUCED: HCPCS

## 2023-03-08 PROCEDURE — 80048 BASIC METABOLIC PNL TOTAL CA: CPT

## 2023-03-08 PROCEDURE — 90935 HEMODIALYSIS ONE EVALUATION: CPT

## 2023-03-08 RX ORDER — DIPHENHYDRAMINE HCL 25 MG
25 TABLET ORAL EVERY 6 HOURS PRN
Status: DISCONTINUED | OUTPATIENT
Start: 2023-03-08 | End: 2023-03-09 | Stop reason: HOSPADM

## 2023-03-08 RX ORDER — NITROGLYCERIN 0.4 MG/1
0.4 TABLET SUBLINGUAL EVERY 5 MIN PRN
Status: DISCONTINUED | OUTPATIENT
Start: 2023-03-08 | End: 2023-03-09 | Stop reason: HOSPADM

## 2023-03-08 RX ORDER — CHOLESTYRAMINE LIGHT 4 G/5.7G
4 POWDER, FOR SUSPENSION ORAL 2 TIMES DAILY
Status: DISCONTINUED | OUTPATIENT
Start: 2023-03-08 | End: 2023-03-09

## 2023-03-08 RX ADMIN — CINACALCET HYDROCHLORIDE 30 MG: 30 TABLET, FILM COATED ORAL at 08:54

## 2023-03-08 RX ADMIN — ACETAMINOPHEN 650 MG: 325 TABLET ORAL at 04:03

## 2023-03-08 RX ADMIN — MONTELUKAST 10 MG: 10 TABLET, FILM COATED ORAL at 08:54

## 2023-03-08 RX ADMIN — DIPHENHYDRAMINE HYDROCHLORIDE 25 MG: 25 TABLET ORAL at 22:11

## 2023-03-08 RX ADMIN — SEVELAMER CARBONATE 800 MG: 800 TABLET, FILM COATED ORAL at 20:59

## 2023-03-08 RX ADMIN — LEVOTHYROXINE SODIUM 100 MCG: 0.1 TABLET ORAL at 06:37

## 2023-03-08 RX ADMIN — ACETAMINOPHEN 650 MG: 325 TABLET ORAL at 15:53

## 2023-03-08 RX ADMIN — SEVELAMER CARBONATE 800 MG: 800 TABLET, FILM COATED ORAL at 13:09

## 2023-03-08 RX ADMIN — CHOLESTYRAMINE 4 G: 4 POWDER, FOR SUSPENSION ORAL at 11:13

## 2023-03-08 RX ADMIN — PIPERACILLIN AND TAZOBACTAM 3375 MG: 3; .375 INJECTION, POWDER, FOR SOLUTION INTRAVENOUS at 22:10

## 2023-03-08 RX ADMIN — SEVELAMER CARBONATE 800 MG: 800 TABLET, FILM COATED ORAL at 08:55

## 2023-03-08 RX ADMIN — MOMETASONE FUROATE AND FORMOTEROL FUMARATE DIHYDRATE 2 PUFF: 200; 5 AEROSOL RESPIRATORY (INHALATION) at 20:22

## 2023-03-08 RX ADMIN — PANTOPRAZOLE SODIUM 40 MG: 40 TABLET, DELAYED RELEASE ORAL at 06:37

## 2023-03-08 RX ADMIN — MOMETASONE FUROATE AND FORMOTEROL FUMARATE DIHYDRATE 2 PUFF: 200; 5 AEROSOL RESPIRATORY (INHALATION) at 07:13

## 2023-03-08 RX ADMIN — CHOLESTYRAMINE 4 G: 4 POWDER, FOR SUSPENSION ORAL at 21:00

## 2023-03-08 RX ADMIN — VANCOMYCIN HYDROCHLORIDE 1000 MG: 1 INJECTION, POWDER, LYOPHILIZED, FOR SOLUTION INTRAVENOUS at 23:43

## 2023-03-08 ASSESSMENT — PAIN SCALES - WONG BAKER: WONGBAKER_NUMERICALRESPONSE: 0

## 2023-03-08 ASSESSMENT — PAIN SCALES - GENERAL
PAINLEVEL_OUTOF10: 3
PAINLEVEL_OUTOF10: 0

## 2023-03-08 NOTE — PROGRESS NOTES
Gastroenterology Progress Note         Admit Date:  3/1/2023    Today's Date:  3/8/2023    CC: Nausea and vomiting, diarrhea, rectal bleeding. Subjective:     Patient patient is doing about the same. Discussion with the hospitalist and nursing staff reveals that cardiology plans to transfer her to the hospital in [place]. They are she will undergo cardiac catheterization and she might get an ICD placed given the ventricular tachycardia and other arrhythmias she has had. She remains stable from a cardiac standpoint with no further evidence of arrhythmias. Review of her records when I saw her in the office previously shows that she did not complain of diarrhea but just complained of abdominal pain. She also reported yesterday that she has tried Imodium A-D in the past for diarrhea and it did help.     Medications:   Current Facility-Administered Medications   Medication Dose Route Frequency    cholestyramine light packet 4 g  4 g Oral BID    propofol injection  5-50 mcg/kg/min IntraVENous Continuous    succinylcholine (ANECTINE) injection 200 mg  200 mg IntraVENous Once    lidocaine 2000 mg in dextrose 5% 500 mL infusion  1 mg/min IntraVENous Continuous    albuterol (PROVENTIL) nebulizer solution 2.5 mg  2.5 mg Nebulization Q4H PRN    lidocaine 4 % external patch 1 patch  1 patch TransDERmal Daily    prochlorperazine (COMPAZINE) injection 10 mg  10 mg IntraVENous Q6H PRN    [Held by provider] apixaban (ELIQUIS) tablet 2.5 mg  2.5 mg Oral BID    acetaminophen (TYLENOL) tablet 650 mg  650 mg Oral Q6H PRN    acetaminophen (TYLENOL) suppository 650 mg  650 mg Rectal Q6H PRN    levothyroxine (SYNTHROID) tablet 100 mcg  100 mcg Oral QAM AC    albuterol sulfate HFA (PROVENTIL;VENTOLIN;PROAIR) 108 (90 Base) MCG/ACT inhaler 1 puff  1 puff Inhalation Q4H PRN    aluminum & magnesium hydroxide-simethicone (MAALOX) 200-200-20 MG/5ML suspension 10 mL  10 mL Oral Q6H PRN    [Held by provider] carvedilol (COREG) tablet 25 mg  25 mg Oral BID WC    cinacalcet (SENSIPAR) tablet 30 mg  30 mg Oral Daily    pantoprazole (PROTONIX) tablet 40 mg  40 mg Oral QAM AC    [Held by provider] losartan (COZAAR) tablet 50 mg  50 mg Oral Daily    montelukast (SINGULAIR) tablet 10 mg  10 mg Oral Daily    sevelamer (RENVELA) tablet 800 mg  800 mg Oral TID    glucose chewable tablet 16 g  4 tablet Oral PRN    dextrose bolus 10% 125 mL  125 mL IntraVENous PRN    Or    dextrose bolus 10% 250 mL  250 mL IntraVENous PRN    glucagon injection 1 mg  1 mg SubCUTAneous PRN    dextrose 10 % infusion   IntraVENous Continuous PRN    mometasone-formoterol (DULERA) 200-5 MCG/ACT inhaler 2 puff  2 puff Inhalation BID       Review of Systems:  ROS was obtained, with pertinent positives as listed above. A detailed 10 system ROS is obtained, with pertinent positives as listed above. All others are negative as far as what is obtainable. Diet:  no change    Objective:   Vitals:  /65   Pulse 82   Temp 98.3 °F (36.8 °C) (Oral)   Resp 18   Ht 5' 1\" (1.549 m)   Wt 137 lb 11.2 oz (62.5 kg)   SpO2 97%   BMI 26.02 kg/m² . Intake/Output:  03/08 0701 - 03/08 1900  In: 300   Out: 2300   03/06 1901 - 03/08 0700  In: 818.8   Out: 2493     Exam:  General appearance: alert, cooperative, no distress. She is somewhat withdrawn. Abdomen: soft, non-tender.  Bowel sounds normal. No masses, no organomegaly  Neuro:  alert and oriented    Data Review (Labs):      Sodium   Date Value Ref Range Status   03/08/2023 134 (L) 136 - 145 mmol/L Final     Potassium   Date Value Ref Range Status   03/08/2023 4.0 3.5 - 5.5 mmol/L Final     Chloride   Date Value Ref Range Status   03/08/2023 98 (L) 100 - 111 mmol/L Final     CO2   Date Value Ref Range Status   03/08/2023 25 21 - 32 mmol/L Final     BUN   Date Value Ref Range Status   03/08/2023 35 (H) 7 - 18 mg/dL Final     Creatinine   Date Value Ref Range Status   03/08/2023 5.98 (H) 0.60 - 1.30 mg/dL Final     GFR    Date Value Ref Range Status   09/12/2022 7 (L) >60 ml/min/1.73m2 Final     AST   Date Value Ref Range Status   03/01/2023 14 10 - 38 U/L Final     ALT   Date Value Ref Range Status   03/01/2023 14 13 - 56 U/L Final     Albumin   Date Value Ref Range Status   03/01/2023 3.0 (L) 3.4 - 5.0 g/dL Final     Globulin   Date Value Ref Range Status   03/01/2023 4.3 (H) 2.0 - 4.0 g/dL Final     WBC   Date Value Ref Range Status   03/08/2023 10.0 4.6 - 13.2 K/uL Final     RBC   Date Value Ref Range Status   03/08/2023 2.89 (L) 4.20 - 5.30 M/uL Final     Hemoglobin   Date Value Ref Range Status   03/08/2023 9.0 (L) 12.0 - 16.0 g/dL Final     Hematocrit   Date Value Ref Range Status   03/08/2023 27.2 (L) 35.0 - 45.0 % Final     MCHC   Date Value Ref Range Status   03/08/2023 33.1 31.0 - 37.0 g/dL Final     MCV   Date Value Ref Range Status   03/08/2023 94.1 78.0 - 100.0 FL Final     RDW   Date Value Ref Range Status   03/08/2023 17.2 (H) 11.6 - 14.5 % Final     Platelets   Date Value Ref Range Status   03/08/2023 236 135 - 420 K/uL Final            CAT Scan Result (most recent):  CT HEAD WO CONTRAST 03/02/2023    Narrative  EXAM: CT HEAD WO CONTRAST    INDICATION: altered mental status    COMPARISON: 5/19/2021.    CONTRAST: None.    TECHNIQUE: Unenhanced CT of the head was performed using 5 mm images. Brain and  bone windows were generated. Coronal and sagittal reformats. CT dose reduction  was achieved through use of a standardized protocol tailored for this  examination and automatic exposure control for dose modulation.    FINDINGS:  The ventricles and sulci are normal in size, shape and configuration. There is  no significant white matter disease. There is no intracranial hemorrhage,  extra-axial collection, or mass effect. The basilar cisterns are open. No CT  evidence of acute infarct.    The bone windows demonstrate no abnormalities. The visualized portions of the  paranasal sinuses and mastoid  air cells are clear. Impression  No acute intracranial abnormality. MRI Result (most recent):  No results found for this or any previous visit from the past 3650 days. US Result (most recent):  US GALLBLADDER RUQ 08/29/2022    Narrative  EXAM: Limited right upper quadrant abdominal ultrasound    INDICATION: Increasing abdominal pain. COMPARISON: Abdominal CT 08/28/2022    TECHNIQUE: Real-time sonography in multiple planes of the right upper quadrant  was performed with image documentation. Grayscale, color flow Doppler imaging,  and velocity spectral waveform analysis of the portal vein was performed (duplex  imaging). _______________    FINDINGS:    LIVER: The liver is within normal limits in echogenicity, without focal mass. Liver is normal in size, measuring 14 cm. Color flow Doppler and velocity  spectral waveform analysis of the portal vein shows normal (hepatopedal)  direction of flow. BILIARY SYSTEM: No intrahepatic biliary dilatation. Common bile duct is normal  in caliber, measuring 5 mm. GALLBLADDER: Cholecystectomy. TRANSPLANT RIGHT ILIAC FOSSA KIDNEY: Right kidney is small in size, measuring 8  cm in length. No hydronephrosis. No solid renal mass. Simple appearing cysts,  largest measuring up to 2 cm. No visible calculi. Increased cortical  echogenicity with cortical thinning typically seen with medical renal disease. PANCREAS: Head and body are unremarkable in appearance though the tail is  obscured by overlying bowel gas. IVC: Visualized portions are normal in caliber and patent. OTHER: No free intraperitoneal fluid.    _______________    Impression  1. Cholecystectomy. 2. No acute abnormality. Assessment:     Nausea and vomiting. She is on medication as an outpatient for nausea and vomiting. I highly suspect her nausea and vomiting has nothing to do with her digestive system. I feel is multifactorial in nature.   Contributing factors include her kidney disease with uremia, hyponatremia, depression, medications including amiodarone use, possible intermittent small bowel mesenteric ischemia given her arrhythmias, and other non-GI factors. She is clearly too ill to undergo any endoscopic intervention and this is not an acute emergency regardless. Has had her gallbladder removed and had upper endoscopy 11/2020 which showed a hiatal hernia and gastritis. 3/8/2023: About the same. No further suggestions at this time. Rectal bleeding. She has had a colonoscopy as recently as 5/2022. This showed diverticulosis which is significant and large internal hemorrhoids. I suspect her rectal bleeding over the years is due to aggravation of her internal hemorrhoids and not due to any pathology. The present episode of rectal bleeding may be due to hypotension from her ventricular tachycardia resulting in ischemic colitis. No, she has had I suspect ischemic colitis in the past based on her prior history. Please see 5/26/2022 office notes for details. 3/8/2023: Problem is she needs anticoagulation because of her cardiac disease and this likely will exacerbate any risk of bleeding. Again, she is also at risk for ischemic colitis. Ventricular tachycardia. She is intubated and on medication because of the new onset of this problem and her prognosis is guarded. This plus her other problems prevent her from being a candidate for any anesthesia or endoscopic intervention at this time unless she has a life-threatening emergency. 3/8/2023: Plans for transfer to a higher level of care for cardiac catheterization and further evaluation and treatment per cardiology. Paroxysmal atrial fibrillation. She has been seen in the past by cardiology and on amiodarone. Weight loss. This is multifactorial in nature and due to her kidney failure, hypothyroidism, and depression. Anticoagulation use. She is on Eliquis. Failure to thrive.   Again I feel this is multifactorial in nature and not due to a specific GI etiology as above. 3/8/2023:  Because of her anticoagulation use this makes placement of PEG tube extremely difficult and is actually a contraindication. Renal failure. The patient is on hemodialysis 3 times a week and also is on medication for treatment. She has a failed kidney transplant. Anemia. Her hemoglobin/hematocrit are 5.7 and 18. She has transfusions ordered. This is likely decreased from her anemia of chronic disease induced anemia due to the bleeding that occurred. Again, I suspect since she has had ischemic colitis in the past, this likely is what happened this time as well due to hypotension and ventricular tachycardia and anticoagulation use. Diarrhea. This is likely multifactorial as well due to medications, electrolyte shifts, and especially her cholecystectomy suggesting bile salt induced diarrhea. Plan:     There are no plans for any endoscopic intervention or anesthesia at this time as this could very potentially cause the patient's demise and nausea and vomiting is not an acute problem for this patient and she has other reasons that would explain this. This is also true as an outpatient. We could entertain repeat EGD as an outpatient once she is cleared from a cardiac standpoint. However again at this time this is much too high risk to undertake. There was a question of a gastric emptying study. This could be done but very well is likely positive and would be a false positive since renal failure, some of her medications, and her overall general debility from multiple medical problems all can cause delayed emptying of your stomach. It would not change her prognosis, or treatment, and she is not a candidate for either Reglan or erythromycin because of their arrhythmogenic effects which is not appropriate in this patient with multiple arrhythmias.   I would recommend she use Imodium A-D initially for diarrhea 1-4 times a day as needed. If she tries this a couple days and it is ineffective, then she could use cholestyramine to bind bile salts 2-3 times a day. The concern with using cholestyramine which is effective, is it may exacerbate her nausea and vomiting. Another option would be colestipol to bind bile salts which is tablet form. Continue present care otherwise per the hospitalist team.  Further recommendations pending her clinical course. Mora Pierce MD      On this date, 3/8/2023,  I have spent > 20 minutes reviewing previous notes, test results, records information,  and face to face time with the patient for interview/exam, discussing working diagnosis and treatment plan and any testing recommended, as well as documenting on the day of the visit.

## 2023-03-08 NOTE — PROGRESS NOTES
1900 Assumed care of pt from off going nurse CLAUDIO Sharma RN. Pt is A&Ox4 with c/o mild nausea with no vomiting. Diaper gown and sheets changed of copious amount of tarry,black stools. Ginger ale given. Daughter is @bedside. 2004 Lab called. 2 units PRBCs allocated and ready. 2144 Blood retrieved and started. 0146 Blood continuing to infuse. PRBCs slowly increased over a period of 4 hours as not to overload dialysis pt. Provider notified. Proceeding with infusion @125 until complete. 0449 Pt given 650 mg of PRN Tylenol for fever of 100.8. Bedside and Verbal shift change report given to JAY Robles RN (oncoming nurse) by Jaja Munoz RN  (offgoing nurse). Report included the following information Nurse Handoff Report, Intake/Output, MAR, and Recent Results.

## 2023-03-08 NOTE — PROGRESS NOTES
Transfer initiated to [facility] for [facility].   @ [phone]--pt accepted to Hospitalist Dr Saundra Perez  Transfer will update us later with bed availability

## 2023-03-08 NOTE — PROGRESS NOTES
RN reports fever-oral temp 102.4F  S/p extubation yesterday 3/7/22 after a vtach cardiac arrest.  Has had febrile episodes last 24 hours as well- No leukocytosis. Sinus rhythm, no tachycardia. Blood cultures obtained. Tylenol prn. Stat CXR negative. PICC line no s/s infection  ABG-Metabolic alkalosis  Lactic acid is 2  ?? Infectious vs non-infectious source.

## 2023-03-08 NOTE — PLAN OF CARE
Problem: Nutrition Deficit:  Goal: Optimize nutritional status  Outcome: Not Progressing  Flowsheets (Taken 3/8/2023 1722)  Nutrient intake appropriate for improving, restoring, or maintaining nutritional needs:   Recommend appropriate diets, oral nutritional supplements, and vitamin/mineral supplements   Monitor oral intake, labs, and treatment plans   Assess nutritional status and recommend course of action   Comprehensive Nutrition Assessment    Type and Reason for Visit:  Reassess    Nutrition Recommendations/Plan:   Regular diet  allow pt to verbalize foods she is willing to consume  Ensure Clear at breakfast (240 Kcal, 8 gm protein),  Nepro at lunch and supper (900 Kcal, 38 gm protein)  Trial Prosource No Carb      Malnutrition Assessment:  Malnutrition Status:  Severe malnutrition (03/03/23 2120)    Context:      Chronic Condition  Findings of the 6 clinical characteristics of malnutrition:  Energy Intake:  75% or less estimated energy requirements for 1 month or longer  Weight Loss:  No significant weight loss     Body Fat Loss:  Mild body fat loss Triceps   Muscle Mass Loss:  Severe muscle mass loss Temples (temporalis), Clavicles (pectoralis & deltoids), Hand (interosseous)  Fluid Accumulation:  Severe Generalized   Strength:  Not Performed    Nutrition Assessment:    Pt took sips of Ensure Clear, bites of applesauce and water today. Pt states\"I haven't been eating for weeks, you can't expect me to eat so much in a day\". Pt states today  she would skip dialysis due to having loose stools and her decrease in intake was due to loose stoolsProvider recommends immodium AD then if ineffective trial of cholestyramine or colestipol to aide reducing loose stools. GI believes pt may have gastricparesis and colitis due to meds, renal disease. PEG contraindicated at this time.     Nutrition Related Findings:    Glucose 76, Phosphorus 2.4- Low.  K 4.0- repleted with dialysis Wound Type: None       Current Nutrition Intake & Therapies:    Average Meal Intake: Unable to assess  Average Supplements Intake: 0%  ADULT DIET; Regular; 3 carb choices (45 gm/meal)  ADULT ORAL NUTRITION SUPPLEMENT; Breakfast, Lunch, Dinner; Renal Oral Supplement    Anthropometric Measures:  Height: 5' 1\" (154.9 cm)  Ideal Body Weight (IBW): 105 lbs (48 kg)    Admission Body Weight: 156 lb 1.4 oz (70.8 kg)  Current Body Weight: 137 lb 12.6 oz (62.5 kg), 148.7 % IBW. Weight Source: Bed Scale  Current BMI (kg/m2): 26  Usual Body Weight: 150 lb (68 kg)  % Weight Change (Calculated): -5.3  Weight Adjustment For: No Adjustment      BMI Categories: Overweight (BMI 25.0-29. 9)    Estimated Daily Nutrient Needs:  Energy Requirements Based On: Kcal/kg     Energy (kcal/day): 5519-9146 Kcal/d (25-30 Kcal/kg)     Protein (g/day): 65-78 gm/d (1.0-1.2 gm/kg))  Method Used for Fluid Requirements: 1 ml/kcal  Fluid (ml/day): 1500 ml/d    Nutrition Diagnosis:    In context of chronic illness, Severe malnutrition related to endocrine dysfuntion, renal dysfunction, early satiety, psychological cause or life stress as evidenced by Criteria as identified in malnutrition assessment, intake 0-25%, vomiting, poor dentition, nausea    Nutrition Interventions:   Food and/or Nutrient Delivery: Modify Current Diet, Modify Oral Nutrition Supplement  Nutrition Education/Counseling: Education declined  Coordination of Nutrition Care: Continue to monitor while inpatient  Plan of Care discussed with: Provider, Nurse    Goals:  Previous Goal Met: No Progress toward Goal(s)  Goals: PO intake 50% or greater, prior to discharge  Specify Other Goals: if no improvement with supplements , consider EN    Nutrition Monitoring and Evaluation:   Behavioral-Environmental Outcomes: Readiness for Change  Food/Nutrient Intake Outcomes: Food and Nutrient Intake, Supplement Intake  Physical Signs/Symptoms Outcomes: Biochemical Data, Nausea or Vomiting, Diarrhea, Nutrition Focused Physical Findings, Weight    Discharge Planning:     Too soon to determine     Kaylin  Contact: [phone]

## 2023-03-08 NOTE — PROGRESS NOTES
CARDIOLOGY PROGRESS NOTE - NP    Patient seen and examined. This is a patient who is followed for atrial flutter with RVR. She had been NSR on exam yesterday morning, appears to have gone back in the AFIB during dialsysis. Qtc prolongation noted. Prompting us to hold amiodarone and zofran. Overnight having recurrent runs of polymorphic VT with defibrillation. Intubated. Treated w/ IV magnesium. Currently sedated. Currently in sinus rhythm. Telemetry reviewed. Pertinent review of systems items noted above, all other systems are negative. Current medications reviewed. Physical Examination  Vital signs are stable. Blood pressure 90/42, Pulse 91  No apparent distress. Heart is regular, rate and rhythm. Normal S1, S2, harsh murmur. Lungs are clear bilaterally. Abdomen is soft, nontender, normal bowel sounds. Extremities have no edema. Labs reviewed: , K 4.0, Magnesium 2.6, Crt 5.98      Case discussed with Dr. Bob Pineda and our impression and recommendations are as follows:  Atrial flutter with RVR:    - Currently SR. Off amiodarone due to prolonged Qtc. - Eliquis held for drop in hemoglobin and ?GI bleed. GI saw with no plans for intervention. Resume eliquis but previous dose was not optimized as she does not meet criteria for reduced dosing. Should be 5mg BID if GI approves to restart. - Echo with preserved EF, grade 2 diastolic dysfunction.    - TSH 13.9. Continue supplementation and have her follow-up with her primary. Altered mental status: Head CT and labs unremarkable. Mental acuity had been improving, currently intubated and sedated. No known missed doses of Eliquis. Per Care Everywhere she had a cardiac cath in 2020 that showed tortuous vessels but no significant CAD. Hypertension:  Soft due to sedation. Continue to monitor. Polymorphic Vtach: likely due to prolonged Qtc, improving.   - Review of old cardiac records listed prolonged QT previously.  Given QT prolonging meds had already been stopped and electrolytes noncontributory, would benefit from [address] to evaluate for CAD as etiology. If no CAD, will likely need ICD. Spoke to daughter, Leigh Lovett regarding transfer for [address] and EP evaluation. They would like to see how she does after extubation then decide the next steps. - If family decides against [facility], will need to have LifeVest in place prior to discharge. Will evaluation by EP for possible ICD. Please do not hesitate to call me or Dr. Ashley Vaca if additional questions arise.

## 2023-03-08 NOTE — PROGRESS NOTES
Hospitalist Progress Note             Date of Service:  9317  NAME:  Marcos Ohara  :  [id number]  MRN:  [id number]    Admission Summary:   Marcos Ohara is a 74 y.o. female  with ESRD HD, failed renal transplant, HTN, HLP, Hypothyroididsm, and CMV+, who represented herself to the ED today with c/o nausea, vomit, abdominal cramps x 3 days, No missed HD, states HD series is q MF only at Dr Kassie mead. Was here in ED 3 days ago requesting admit for same complaints but was discharged home, she has not been eating, she appears withdrawn, Hx is limited because she appears withdrawn, not wanting to communicate, or partake in her care, she states she has been Just nauseous, no chest pain, sob, fever or chills, no other complaints voiced. Last BM was this morning and loose. Hospitalist was asked to admit.        Interval history / Subjective:     Pt intubated late last night after 2 cardiac arrests      Assessment & Plan:        Cardiac arrest due to V Tach  - pt is intubated due to cardiac arrest x 2 3/6, due to what looks like polymorphic VT- extubated on 3.7, dc'd amoidarne and all other qtc prolonging drugs  - pt started having peaked twaves, and more prlonged qtc interval earlier in the day, then had bp on lower side during HD  - shortly after HD is when events occurred, 1st resolved with precordial thump, 2nd required defibrillator, followed by intubation  - we have iv lidocaine ready for administration if VT were to recurr but pt quite stable from cardiac stand point at this time  - she will need cardiac cath in the future       metabolic encephalopathy  - resolved with HD     Atrial Fibrillation: resolved, noac on hold due to gi bleeding, with drop in hgb  - secondary to the patient not taking her medication due uremic anorexia and vomiting  -transferred to ICU for closer monitor, starting IV Amiodarone  - Converted to NSR, transition to PO after HD and her vomiting improved   - po amio stopped due to prolonged qtc interval  - resume eliquis if ok from a gi perspective  - dc zofran due qtc issues  - will discuss compazine should not effect Qtc interval        Intractable nausea, vomit, abdominal cramps  - reviewed thoughtful gi consult, very little to offer her though besides aggressive general medical mgmt  - asked her if she would want PEG tube if she coulnd't eat, she does not want this at this time  - will discuss if or when she would be a candidate for peg is she didn't start eating  - her chronic diarrhea may be contributing to her lack of eating, will trial cholestiramine, will also offer to GI to use different agent  - ok to use compazine        ESRD- getting HD during my ecam  -nephrologist for HD      Hypertension:  -currently hypotensive, holding all meds  -PRN Hydralazine     Depression:  -not wanting to eat, or drink, or get oob,  Non-suicidal.  -fu pcp, consider ssri and psych eval     Hypothyroidism  -current TSH 13.90, increase from most recent TSH of 12.52  -synthroid increased to 100mcg during this admission     Hyponatremia  - improved after HD        Anemai due to gi blood loss  - sp 1 unit pRBC, great response up to 9, dc second unit,, recheck in am    Start PT  Encourage po intake  Watch on tele for more vtach  Possible dc in am, with understanding that she has many significant ongoing medical issues that need to be watched closely on outpt basis, but not much else to offer as inpt      Review of Systems:   Pertinent items are noted in HPI.       Vital Signs:    Last 24hrs VS reviewed since prior progress note. Most recent are:  Vitals:    03/08/23 0945   BP: 125/61   Pulse: 72   Resp:    Temp: 98.6 °F (37 °C)   SpO2:          Intake/Output Summary (Last 24 hours) at 3/8/2023 0952  Last data filed at 3/8/2023 0203  Gross per 24 hour   Intake 318.8 ml   Output --   Net 318.8 ml        Physical Examination:  General:          Alert, cooperative, no distress, appears stated age. - generalized weakness, receiving hd during exam  HEENT:           Atraumatic, anicteric sclerae, pink conjunctivae                          No oral ulcers, mucosa moist, throat clear, dentition fair  Neck:               Supple, symmetrical  Lungs:             Clear to auscultation bilaterally. No Wheezing or Rhonchi. No rales. Chest wall:      No tenderness  No Accessory muscle use. Heart:              Regular  rhythm,  No  murmur   No edema  Abdomen:        Soft, non-tender. Not distended. Bowel sounds normal  Extremities:     No cyanosis. No clubbing,                            Skin turgor normal, Capillary refill normal  Skin:                Not pale. Not Jaundiced  No rashes   Psych:             Not anxious or agitated. Neurologic:      Alert, moves all extremities, answers questions appropriately and responds to commands        Data Review:    Review and/or order of clinical lab test  Review and/or order of tests in the radiology section of CPT  Review and/or order of tests in the medicine section of [facility]      Labs:     Recent Labs     03/07/23  1638 03/08/23  0320   WBC  --  10.0   HGB 5.7* 9.0*   HCT 18.5* 27.2*   PLT  --  236     Recent Labs     03/06/23 2017 03/07/23  0253 03/07/23  0930 03/08/23  0320   * 132*  --  134*   K 4.0 3.5  --  4.0   CL 96* 93*  --  98*   CO2 29 27  --  25   BUN 14 20*  --  35*   MG 1.9 3.0* 2.8* 2.6   PHOS  --   --   --  2.4*     No results for input(s): ALT, TP, ALB, GLOB, GGT, AML in the last 72 hours. Invalid input(s): SGOT, GPT, AP, TBIL, TBILI, AMYP, LPSE, HLPSE  No results for input(s): INR, APTT in the last 72 hours. Invalid input(s): PTP   No results for input(s): TIBC, FERR in the last 72 hours. Invalid input(s): FE, PSAT   No results found for: FOL, RBCF   No results for input(s): PH, PCO2, PO2 in the last 72 hours.   No results for input(s): CPK in the last 72 hours.    Invalid input(s): CPKMB, CKNDX, TROIQ  Lab Results   Component Value Date/Time    CHOL 129 01/20/2022 03:46 PM    HDL 76 01/20/2022 03:46 PM     No results found for: GLUCPOC  [unfilled]      Medications Reviewed:     Current Facility-Administered Medications   Medication Dose Route Frequency    cholestyramine light packet 4 g  4 g Oral BID    propofol injection  5-50 mcg/kg/min IntraVENous Continuous    succinylcholine (ANECTINE) injection 200 mg  200 mg IntraVENous Once    lidocaine 2000 mg in dextrose 5% 500 mL infusion  1 mg/min IntraVENous Continuous    albuterol (PROVENTIL) nebulizer solution 2.5 mg  2.5 mg Nebulization Q4H PRN    lidocaine 4 % external patch 1 patch  1 patch TransDERmal Daily    [Held by provider] prochlorperazine (COMPAZINE) injection 10 mg  10 mg IntraVENous Q6H PRN    [Held by provider] apixaban (ELIQUIS) tablet 2.5 mg  2.5 mg Oral BID    acetaminophen (TYLENOL) tablet 650 mg  650 mg Oral Q6H PRN    acetaminophen (TYLENOL) suppository 650 mg  650 mg Rectal Q6H PRN    levothyroxine (SYNTHROID) tablet 100 mcg  100 mcg Oral QAM AC    albuterol sulfate HFA (PROVENTIL;VENTOLIN;PROAIR) 108 (90 Base) MCG/ACT inhaler 1 puff  1 puff Inhalation Q4H PRN    aluminum & magnesium hydroxide-simethicone (MAALOX) 200-200-20 MG/5ML suspension 10 mL  10 mL Oral Q6H PRN    carvedilol (COREG) tablet 25 mg  25 mg Oral BID WC    cinacalcet (SENSIPAR) tablet 30 mg  30 mg Oral Daily    pantoprazole (PROTONIX) tablet 40 mg  40 mg Oral QAM AC    losartan (COZAAR) tablet 50 mg  50 mg Oral Daily    montelukast (SINGULAIR) tablet 10 mg  10 mg Oral Daily    sevelamer (RENVELA) tablet 800 mg  800 mg Oral TID    glucose chewable tablet 16 g  4 tablet Oral PRN    dextrose bolus 10% 125 mL  125 mL IntraVENous PRN    Or    dextrose bolus 10% 250 mL  250 mL IntraVENous PRN    glucagon injection 1 mg  1 mg SubCUTAneous PRN    dextrose 10 % infusion   IntraVENous Continuous PRN    [Held by provider] ondansetron (ZOFRAN) injection 4 mg  4 mg IntraVENous Q6H PRN    [Held by provider] ondansetron (ZOFRAN) injection 4 mg  4 mg IntraVENous Q6H PRN    mometasone-formoterol (DULERA) 200-5 MCG/ACT inhaler 2 puff  2 puff Inhalation BID     ______________________________________________________________________  EXPECTED LENGTH OF STAY: [unfilled]  ACTUAL LENGTH OF STAY:          6                 Raeann Lopez MD

## 2023-03-08 NOTE — PROGRESS NOTES
ABG taken as ordered on room air, Called  results to Dr Tristen Johns, he ordered to be placed back to  2 liter NC, Nurse in ICU notified to place pt back to 2 liter NC.     Latest Reference Range & Units 3/8/23 15:12   Pedro Luis Test -   YES   Oxyhemoglobin 95 - 99 % 90.2 (L)   pH, Arterial 7.35 - 7.45   7.52 (H)   pCO2, Arterial 35 - 45 mmHg 35   pO2, Arterial 80 - 100 mmHg 53 (L)   HCO3, Arterial 22 - 26 mmol/L 27 (H)   Base Excess, Arterial 0 - 3 mmol/L 4.3 (H)   O2 Sat, Arterial 95 - 99 % 91 (L)   Methemoglobin, Arterial 0 - 1.4 % 0.0   Carboxyhgb, Arterial 1 - 2 % 1.0   Site -   Left Radial   (L): Data is abnormally low  (H): Data is abnormally high

## 2023-03-08 NOTE — DIALYSIS
Carmen Hua  [phone]    Dialysis RN at the bedside to provide HD tx. Pt A&O x2,   Report obtained from MICHELLE Sams AVF: site cleaned per P&P, cannulated with 15 G 1\" needles x2, no complications. 3 hrs tx completed without any difficulties, at the end remaining blood in circuit returned with 100 cc NS, cannulas   Removed x2, hemostasis achieved, dressing applied.     Report given to Kevin Goodman RN    TLP: 67 L  Net loss: 2000 ml

## 2023-03-09 ENCOUNTER — HOSPITAL ENCOUNTER (INPATIENT)
Age: 68
LOS: 8 days | Discharge: HOME | End: 2023-03-17
Attending: INTERNAL MEDICINE | Admitting: INTERNAL MEDICINE
Payer: MEDICARE

## 2023-03-09 ENCOUNTER — APPOINTMENT (OUTPATIENT)
Dept: GENERAL RADIOLOGY | Age: 68
End: 2023-03-09
Attending: INTERNAL MEDICINE
Payer: MEDICARE

## 2023-03-09 VITALS
SYSTOLIC BLOOD PRESSURE: 131 MMHG | BODY MASS INDEX: 26.04 KG/M2 | OXYGEN SATURATION: 91 % | RESPIRATION RATE: 20 BRPM | TEMPERATURE: 98.2 F | HEIGHT: 61 IN | WEIGHT: 137.9 LBS | DIASTOLIC BLOOD PRESSURE: 52 MMHG | HEART RATE: 81 BPM

## 2023-03-09 DIAGNOSIS — R07.89 OTHER CHEST PAIN: ICD-10-CM

## 2023-03-09 DIAGNOSIS — R94.31 ABNORMAL ECG: Primary | ICD-10-CM

## 2023-03-09 PROBLEM — I47.20 VENTRICULAR TACHYARRHYTHMIA: Status: ACTIVE | Noted: 2023-03-09

## 2023-03-09 PROBLEM — I47.20 VENTRICULAR TACHYARRHYTHMIA (HCC): Status: ACTIVE | Noted: 2023-03-09

## 2023-03-09 LAB
ALBUMIN SERPL-MCNC: 1.9 G/DL (ref 3.5–5)
ALBUMIN SERPL-MCNC: 1.9 G/DL (ref 3.5–5)
ALBUMIN/GLOB SERPL: 0.5 (ref 1.1–2.2)
ALP SERPL-CCNC: 125 U/L (ref 45–117)
ALT SERPL-CCNC: 14 U/L (ref 12–78)
ANION GAP SERPL CALC-SCNC: 5 MMOL/L (ref 3–18)
ANION GAP SERPL CALC-SCNC: 5 MMOL/L (ref 5–15)
ANION GAP SERPL CALC-SCNC: 5 MMOL/L (ref 5–15)
AST SERPL W P-5'-P-CCNC: 26 U/L (ref 15–37)
BASOPHILS # BLD: 0.1 K/UL (ref 0–0.1)
BASOPHILS # BLD: 0.1 K/UL (ref 0–0.1)
BASOPHILS NFR BLD: 1 % (ref 0–1)
BASOPHILS NFR BLD: 1 % (ref 0–2)
BILIRUB SERPL-MCNC: 0.6 MG/DL (ref 0.2–1)
BUN SERPL-MCNC: 17 MG/DL (ref 7–18)
BUN SERPL-MCNC: 25 MG/DL (ref 6–20)
BUN SERPL-MCNC: 25 MG/DL (ref 6–20)
BUN/CREAT SERPL: 4 (ref 12–20)
BUN/CREAT SERPL: 5 (ref 12–20)
BUN/CREAT SERPL: 5 (ref 12–20)
CA-I BLD-MCNC: 7.1 MG/DL (ref 8.5–10.1)
CA-I BLD-MCNC: 7.2 MG/DL (ref 8.5–10.1)
CA-I BLD-MCNC: 7.2 MG/DL (ref 8.5–10.1)
CHLORIDE SERPL-SCNC: 100 MMOL/L (ref 100–111)
CHLORIDE SERPL-SCNC: 96 MMOL/L (ref 97–108)
CHLORIDE SERPL-SCNC: 97 MMOL/L (ref 97–108)
CO2 SERPL-SCNC: 26 MMOL/L (ref 21–32)
CO2 SERPL-SCNC: 27 MMOL/L (ref 21–32)
CO2 SERPL-SCNC: 29 MMOL/L (ref 21–32)
CREAT SERPL-MCNC: 3.81 MG/DL (ref 0.6–1.3)
CREAT SERPL-MCNC: 5.07 MG/DL (ref 0.55–1.02)
CREAT SERPL-MCNC: 5.11 MG/DL (ref 0.55–1.02)
DIFFERENTIAL METHOD BLD: ABNORMAL
DIFFERENTIAL METHOD BLD: ABNORMAL
EKG ATRIAL RATE: 84 BPM
EKG ATRIAL RATE: 94 BPM
EKG DIAGNOSIS: NORMAL
EKG DIAGNOSIS: NORMAL
EKG P AXIS: 25 DEGREES
EKG P AXIS: 58 DEGREES
EKG P-R INTERVAL: 172 MS
EKG P-R INTERVAL: 196 MS
EKG Q-T INTERVAL: 418 MS
EKG Q-T INTERVAL: 454 MS
EKG QRS DURATION: 84 MS
EKG QRS DURATION: 88 MS
EKG QTC CALCULATION (BAZETT): 522 MS
EKG QTC CALCULATION (BAZETT): 536 MS
EKG R AXIS: 58 DEGREES
EKG R AXIS: 66 DEGREES
EKG T AXIS: 91 DEGREES
EKG T AXIS: 91 DEGREES
EKG VENTRICULAR RATE: 84 BPM
EKG VENTRICULAR RATE: 94 BPM
EOSINOPHIL # BLD: 0.1 K/UL (ref 0–0.4)
EOSINOPHIL # BLD: 0.2 K/UL (ref 0–0.4)
EOSINOPHIL NFR BLD: 2 % (ref 0–5)
EOSINOPHIL NFR BLD: 2 % (ref 0–7)
ERYTHROCYTE [DISTWIDTH] IN BLOOD BY AUTOMATED COUNT: 17.3 % (ref 11.5–14.5)
ERYTHROCYTE [DISTWIDTH] IN BLOOD BY AUTOMATED COUNT: 17.7 % (ref 11.6–14.5)
GLOBULIN SER CALC-MCNC: 3.5 G/DL (ref 2–4)
GLUCOSE BLD STRIP.AUTO-MCNC: 132 MG/DL (ref 70–110)
GLUCOSE BLD STRIP.AUTO-MCNC: 169 MG/DL (ref 70–110)
GLUCOSE BLD STRIP.AUTO-MCNC: 60 MG/DL (ref 70–110)
GLUCOSE BLD STRIP.AUTO-MCNC: 72 MG/DL (ref 70–110)
GLUCOSE BLD STRIP.AUTO-MCNC: 78 MG/DL (ref 70–110)
GLUCOSE BLD STRIP.AUTO-MCNC: 79 MG/DL (ref 70–110)
GLUCOSE BLD STRIP.AUTO-MCNC: 87 MG/DL (ref 70–110)
GLUCOSE SERPL-MCNC: 149 MG/DL (ref 65–100)
GLUCOSE SERPL-MCNC: 150 MG/DL (ref 65–100)
GLUCOSE SERPL-MCNC: 84 MG/DL (ref 74–99)
HCT VFR BLD AUTO: 26.4 % (ref 35–47)
HCT VFR BLD AUTO: 26.6 % (ref 35–45)
HGB BLD-MCNC: 8.2 G/DL (ref 11.5–16)
HGB BLD-MCNC: 8.6 G/DL (ref 12–16)
IMM GRANULOCYTES # BLD AUTO: 0 K/UL (ref 0–0.04)
IMM GRANULOCYTES # BLD AUTO: 0.1 K/UL (ref 0–0.04)
IMM GRANULOCYTES NFR BLD AUTO: 0 % (ref 0–0.5)
IMM GRANULOCYTES NFR BLD AUTO: 1 % (ref 0–0.5)
LYMPHOCYTES # BLD: 1 K/UL (ref 0.8–3.5)
LYMPHOCYTES # BLD: 1.8 K/UL (ref 0.9–3.6)
LYMPHOCYTES NFR BLD: 15 % (ref 12–49)
LYMPHOCYTES NFR BLD: 20 % (ref 21–52)
MAGNESIUM SERPL-MCNC: 2.1 MG/DL (ref 1.6–2.6)
MCH RBC QN AUTO: 30.3 PG (ref 26–34)
MCH RBC QN AUTO: 31.5 PG (ref 24–34)
MCHC RBC AUTO-ENTMCNC: 31.1 G/DL (ref 30–36.5)
MCHC RBC AUTO-ENTMCNC: 32.3 G/DL (ref 31–37)
MCV RBC AUTO: 97.4 FL (ref 78–100)
MCV RBC AUTO: 97.4 FL (ref 80–99)
MONOCYTES # BLD: 1.3 K/UL (ref 0–1)
MONOCYTES # BLD: 1.5 K/UL (ref 0.05–1.2)
MONOCYTES NFR BLD: 16 % (ref 3–10)
MONOCYTES NFR BLD: 18 % (ref 5–13)
NEUTS SEG # BLD: 4.5 K/UL (ref 1.8–8)
NEUTS SEG # BLD: 5.5 K/UL (ref 1.8–8)
NEUTS SEG NFR BLD: 61 % (ref 40–73)
NEUTS SEG NFR BLD: 63 % (ref 32–75)
NRBC # BLD: 0 K/UL (ref 0–0.01)
NRBC # BLD: 0.02 K/UL (ref 0–0.01)
NRBC BLD-RTO: 0 PER 100 WBC
NRBC BLD-RTO: 0.3 PER 100 WBC
PERFORMED BY:: ABNORMAL
PERFORMED BY:: NORMAL
PHOSPHATE SERPL-MCNC: 1.3 MG/DL (ref 2.6–4.7)
PHOSPHATE SERPL-MCNC: 1.8 MG/DL (ref 2.5–4.9)
PLATELET # BLD AUTO: 219 K/UL (ref 150–400)
PLATELET # BLD AUTO: 223 K/UL (ref 135–420)
PMV BLD AUTO: 9.2 FL (ref 8.9–12.9)
PMV BLD AUTO: 9.7 FL (ref 9.2–11.8)
POTASSIUM SERPL-SCNC: 3.7 MMOL/L (ref 3.5–5.1)
POTASSIUM SERPL-SCNC: 3.8 MMOL/L (ref 3.5–5.1)
POTASSIUM SERPL-SCNC: 3.8 MMOL/L (ref 3.5–5.5)
PROCALCITONIN SERPL-MCNC: 80.88 NG/ML
PROT SERPL-MCNC: 5.4 G/DL (ref 6.4–8.2)
RBC # BLD AUTO: 2.71 M/UL (ref 3.8–5.2)
RBC # BLD AUTO: 2.73 M/UL (ref 4.2–5.3)
SODIUM SERPL-SCNC: 128 MMOL/L (ref 136–145)
SODIUM SERPL-SCNC: 128 MMOL/L (ref 136–145)
SODIUM SERPL-SCNC: 134 MMOL/L (ref 136–145)
TROPONIN I SERPL HS-MCNC: 22 NG/L (ref 0–51)
WBC # BLD AUTO: 7 K/UL (ref 3.6–11)
WBC # BLD AUTO: 9.1 K/UL (ref 4.6–13.2)

## 2023-03-09 PROCEDURE — 6360000002 HC RX W HCPCS: Performed by: NURSE PRACTITIONER

## 2023-03-09 PROCEDURE — 93005 ELECTROCARDIOGRAM TRACING: CPT | Performed by: NURSE PRACTITIONER

## 2023-03-09 PROCEDURE — 94761 N-INVAS EAR/PLS OXIMETRY MLT: CPT

## 2023-03-09 PROCEDURE — 65270000029 HC RM PRIVATE

## 2023-03-09 PROCEDURE — 80053 COMPREHEN METABOLIC PANEL: CPT

## 2023-03-09 PROCEDURE — 80069 RENAL FUNCTION PANEL: CPT

## 2023-03-09 PROCEDURE — 83735 ASSAY OF MAGNESIUM: CPT

## 2023-03-09 PROCEDURE — 84484 ASSAY OF TROPONIN QUANT: CPT

## 2023-03-09 PROCEDURE — 93005 ELECTROCARDIOGRAM TRACING: CPT

## 2023-03-09 PROCEDURE — 6370000000 HC RX 637 (ALT 250 FOR IP): Performed by: INTERNAL MEDICINE

## 2023-03-09 PROCEDURE — 6370000000 HC RX 637 (ALT 250 FOR IP): Performed by: NURSE PRACTITIONER

## 2023-03-09 PROCEDURE — 84100 ASSAY OF PHOSPHORUS: CPT

## 2023-03-09 PROCEDURE — 82962 GLUCOSE BLOOD TEST: CPT

## 2023-03-09 PROCEDURE — 94640 AIRWAY INHALATION TREATMENT: CPT

## 2023-03-09 PROCEDURE — 2580000003 HC RX 258: Performed by: NURSE PRACTITIONER

## 2023-03-09 PROCEDURE — 85025 COMPLETE CBC W/AUTO DIFF WBC: CPT

## 2023-03-09 PROCEDURE — 6360000002 HC RX W HCPCS: Performed by: INTERNAL MEDICINE

## 2023-03-09 PROCEDURE — 74011000250 HC RX REV CODE- 250: Performed by: INTERNAL MEDICINE

## 2023-03-09 PROCEDURE — 74011250637 HC RX REV CODE- 250/637: Performed by: INTERNAL MEDICINE

## 2023-03-09 PROCEDURE — 71045 X-RAY EXAM CHEST 1 VIEW: CPT

## 2023-03-09 PROCEDURE — 97162 PT EVAL MOD COMPLEX 30 MIN: CPT

## 2023-03-09 PROCEDURE — 80048 BASIC METABOLIC PNL TOTAL CA: CPT

## 2023-03-09 PROCEDURE — 84145 PROCALCITONIN (PCT): CPT

## 2023-03-09 PROCEDURE — 36415 COLL VENOUS BLD VENIPUNCTURE: CPT

## 2023-03-09 RX ORDER — CALCITRIOL 0.25 UG/1
0.5 CAPSULE ORAL
Status: DISCONTINUED | OUTPATIENT
Start: 2023-03-11 | End: 2023-03-17 | Stop reason: HOSPADM

## 2023-03-09 RX ORDER — TRAZODONE HYDROCHLORIDE 50 MG/1
50 TABLET ORAL
Status: DISCONTINUED | OUTPATIENT
Start: 2023-03-09 | End: 2023-03-09

## 2023-03-09 RX ORDER — MAG HYDROX/ALUMINUM HYD/SIMETH 200-200-20
10 SUSPENSION, ORAL (FINAL DOSE FORM) ORAL
Status: DISCONTINUED | OUTPATIENT
Start: 2023-03-09 | End: 2023-03-17 | Stop reason: HOSPADM

## 2023-03-09 RX ORDER — SODIUM CHLORIDE 0.9 % (FLUSH) 0.9 %
5-40 SYRINGE (ML) INJECTION EVERY 8 HOURS
Status: DISCONTINUED | OUTPATIENT
Start: 2023-03-09 | End: 2023-03-17 | Stop reason: HOSPADM

## 2023-03-09 RX ORDER — ALBUTEROL SULFATE 90 UG/1
2 AEROSOL, METERED RESPIRATORY (INHALATION) EVERY 6 HOURS PRN
Status: DISCONTINUED | OUTPATIENT
Start: 2023-03-09 | End: 2023-03-09

## 2023-03-09 RX ORDER — VALGANCICLOVIR 450 MG/1
900 TABLET, FILM COATED ORAL DAILY
Status: DISCONTINUED | OUTPATIENT
Start: 2023-03-10 | End: 2023-03-09

## 2023-03-09 RX ORDER — ONDANSETRON 4 MG/1
4 TABLET, ORALLY DISINTEGRATING ORAL
Status: DISCONTINUED | OUTPATIENT
Start: 2023-03-09 | End: 2023-03-09

## 2023-03-09 RX ORDER — SEVELAMER CARBONATE 800 MG/1
800 TABLET, FILM COATED ORAL 3 TIMES DAILY
Status: DISCONTINUED | OUTPATIENT
Start: 2023-03-09 | End: 2023-03-17 | Stop reason: HOSPADM

## 2023-03-09 RX ORDER — AMLODIPINE BESYLATE 5 MG/1
10 TABLET ORAL DAILY
Status: DISCONTINUED | OUTPATIENT
Start: 2023-03-10 | End: 2023-03-09

## 2023-03-09 RX ORDER — POLYETHYLENE GLYCOL 3350 17 G/17G
17 POWDER, FOR SOLUTION ORAL DAILY PRN
Status: DISCONTINUED | OUTPATIENT
Start: 2023-03-09 | End: 2023-03-17 | Stop reason: HOSPADM

## 2023-03-09 RX ORDER — DEXTROSE MONOHYDRATE 100 MG/ML
250 INJECTION, SOLUTION INTRAVENOUS AS NEEDED
Status: DISCONTINUED | OUTPATIENT
Start: 2023-03-09 | End: 2023-03-17 | Stop reason: HOSPADM

## 2023-03-09 RX ORDER — ALBUTEROL SULFATE 90 UG/1
1 AEROSOL, METERED RESPIRATORY (INHALATION) EVERY 4 HOURS PRN
Status: DISCONTINUED | OUTPATIENT
Start: 2023-03-09 | End: 2023-03-09

## 2023-03-09 RX ORDER — MONTELUKAST SODIUM 10 MG/1
10 TABLET ORAL DAILY
Status: DISCONTINUED | OUTPATIENT
Start: 2023-03-10 | End: 2023-03-17 | Stop reason: HOSPADM

## 2023-03-09 RX ORDER — CLOPIDOGREL BISULFATE 75 MG/1
75 TABLET ORAL DAILY
Status: DISCONTINUED | OUTPATIENT
Start: 2023-03-10 | End: 2023-03-09

## 2023-03-09 RX ORDER — FAMOTIDINE 20 MG/1
20 TABLET, FILM COATED ORAL DAILY
Status: DISCONTINUED | OUTPATIENT
Start: 2023-03-10 | End: 2023-03-17 | Stop reason: HOSPADM

## 2023-03-09 RX ORDER — MIRTAZAPINE 15 MG/1
15 TABLET, FILM COATED ORAL NIGHTLY
Status: DISCONTINUED | OUTPATIENT
Start: 2023-03-09 | End: 2023-03-09 | Stop reason: HOSPADM

## 2023-03-09 RX ORDER — LOSARTAN POTASSIUM 50 MG/1
50 TABLET ORAL DAILY
Status: DISCONTINUED | OUTPATIENT
Start: 2023-03-10 | End: 2023-03-17 | Stop reason: HOSPADM

## 2023-03-09 RX ORDER — SODIUM CHLORIDE 0.9 % (FLUSH) 0.9 %
5-40 SYRINGE (ML) INJECTION AS NEEDED
Status: DISCONTINUED | OUTPATIENT
Start: 2023-03-09 | End: 2023-03-17 | Stop reason: HOSPADM

## 2023-03-09 RX ORDER — CARVEDILOL 12.5 MG/1
25 TABLET ORAL 2 TIMES DAILY WITH MEALS
Status: DISCONTINUED | OUTPATIENT
Start: 2023-03-10 | End: 2023-03-17 | Stop reason: HOSPADM

## 2023-03-09 RX ORDER — ALBUTEROL SULFATE 90 UG/1
1 AEROSOL, METERED RESPIRATORY (INHALATION)
Status: DISCONTINUED | OUTPATIENT
Start: 2023-03-09 | End: 2023-03-17 | Stop reason: HOSPADM

## 2023-03-09 RX ORDER — FLUTICASONE PROPIONATE 50 MCG
2 SPRAY, SUSPENSION (ML) NASAL DAILY
Status: DISCONTINUED | OUTPATIENT
Start: 2023-03-10 | End: 2023-03-17 | Stop reason: HOSPADM

## 2023-03-09 RX ORDER — ACETAMINOPHEN 650 MG/1
650 SUPPOSITORY RECTAL
Status: DISCONTINUED | OUTPATIENT
Start: 2023-03-09 | End: 2023-03-17 | Stop reason: HOSPADM

## 2023-03-09 RX ORDER — ONDANSETRON 2 MG/ML
4 INJECTION INTRAMUSCULAR; INTRAVENOUS
Status: DISCONTINUED | OUTPATIENT
Start: 2023-03-09 | End: 2023-03-09

## 2023-03-09 RX ORDER — LEVOTHYROXINE SODIUM 100 UG/1
100 TABLET ORAL
Status: DISCONTINUED | OUTPATIENT
Start: 2023-03-10 | End: 2023-03-17 | Stop reason: HOSPADM

## 2023-03-09 RX ORDER — ALBUTEROL SULFATE 90 UG/1
2 AEROSOL, METERED RESPIRATORY (INHALATION) EVERY 4 HOURS PRN
Status: DISCONTINUED | OUTPATIENT
Start: 2023-03-09 | End: 2023-03-09

## 2023-03-09 RX ORDER — SIMVASTATIN 10 MG/1
20 TABLET, FILM COATED ORAL DAILY
Status: DISCONTINUED | OUTPATIENT
Start: 2023-03-10 | End: 2023-03-09

## 2023-03-09 RX ORDER — MEGESTROL ACETATE 40 MG/ML
200 SUSPENSION ORAL DAILY
Status: DISCONTINUED | OUTPATIENT
Start: 2023-03-10 | End: 2023-03-17 | Stop reason: HOSPADM

## 2023-03-09 RX ORDER — PANTOPRAZOLE SODIUM 40 MG/1
40 TABLET, DELAYED RELEASE ORAL
Status: DISCONTINUED | OUTPATIENT
Start: 2023-03-10 | End: 2023-03-17 | Stop reason: HOSPADM

## 2023-03-09 RX ORDER — LOPERAMIDE HYDROCHLORIDE 2 MG/1
2 CAPSULE ORAL 3 TIMES DAILY
Status: DISCONTINUED | OUTPATIENT
Start: 2023-03-09 | End: 2023-03-09 | Stop reason: HOSPADM

## 2023-03-09 RX ORDER — AMIODARONE HYDROCHLORIDE 200 MG/1
200 TABLET ORAL DAILY
Status: DISCONTINUED | OUTPATIENT
Start: 2023-03-10 | End: 2023-03-09

## 2023-03-09 RX ORDER — LEVOTHYROXINE SODIUM 75 UG/1
75 TABLET ORAL
Status: DISCONTINUED | OUTPATIENT
Start: 2023-03-10 | End: 2023-03-09

## 2023-03-09 RX ORDER — CINACALCET 30 MG/1
30 TABLET, FILM COATED ORAL DAILY
Status: DISCONTINUED | OUTPATIENT
Start: 2023-03-10 | End: 2023-03-17 | Stop reason: HOSPADM

## 2023-03-09 RX ORDER — SUCRALFATE 1 G/1
1 TABLET ORAL
Status: DISCONTINUED | OUTPATIENT
Start: 2023-03-09 | End: 2023-03-09

## 2023-03-09 RX ORDER — BUDESONIDE AND FORMOTEROL FUMARATE DIHYDRATE 160; 4.5 UG/1; UG/1
2 AEROSOL RESPIRATORY (INHALATION)
Status: DISCONTINUED | OUTPATIENT
Start: 2023-03-09 | End: 2023-03-17 | Stop reason: HOSPADM

## 2023-03-09 RX ORDER — ACETAMINOPHEN 325 MG/1
650 TABLET ORAL
Status: DISCONTINUED | OUTPATIENT
Start: 2023-03-09 | End: 2023-03-17 | Stop reason: HOSPADM

## 2023-03-09 RX ORDER — DEXTROSE MONOHYDRATE 100 MG/ML
250 INJECTION, SOLUTION INTRAVENOUS ONCE
Status: COMPLETED | OUTPATIENT
Start: 2023-03-09 | End: 2023-03-10

## 2023-03-09 RX ORDER — CARVEDILOL 6.25 MG/1
6.25 TABLET ORAL 2 TIMES DAILY WITH MEALS
Status: DISCONTINUED | OUTPATIENT
Start: 2023-03-09 | End: 2023-03-09 | Stop reason: HOSPADM

## 2023-03-09 RX ADMIN — LOPERAMIDE HYDROCHLORIDE 2 MG: 2 CAPSULE ORAL at 10:21

## 2023-03-09 RX ADMIN — BUDESONIDE AND FORMOTEROL FUMARATE DIHYDRATE 2 PUFF: 160; 4.5 AEROSOL RESPIRATORY (INHALATION) at 21:23

## 2023-03-09 RX ADMIN — PROCHLORPERAZINE EDISYLATE 10 MG: 5 INJECTION INTRAMUSCULAR; INTRAVENOUS at 04:15

## 2023-03-09 RX ADMIN — GLUCAGON 1 MG: KIT at 04:37

## 2023-03-09 RX ADMIN — CINACALCET HYDROCHLORIDE 30 MG: 30 TABLET, FILM COATED ORAL at 08:27

## 2023-03-09 RX ADMIN — PROCHLORPERAZINE EDISYLATE 10 MG: 5 INJECTION INTRAMUSCULAR; INTRAVENOUS at 12:53

## 2023-03-09 RX ADMIN — PIPERACILLIN AND TAZOBACTAM 3375 MG: 3; .375 INJECTION, POWDER, FOR SOLUTION INTRAVENOUS at 06:33

## 2023-03-09 RX ADMIN — CARVEDILOL 6.25 MG: 6.25 TABLET, FILM COATED ORAL at 15:56

## 2023-03-09 RX ADMIN — CARVEDILOL 6.25 MG: 6.25 TABLET, FILM COATED ORAL at 08:29

## 2023-03-09 RX ADMIN — SEVELAMER CARBONATE 800 MG: 800 TABLET, FILM COATED ORAL at 08:27

## 2023-03-09 RX ADMIN — DEXTROSE MONOHYDRATE 250 ML: 100 INJECTION, SOLUTION INTRAVENOUS at 21:06

## 2023-03-09 RX ADMIN — MOMETASONE FUROATE AND FORMOTEROL FUMARATE DIHYDRATE 2 PUFF: 200; 5 AEROSOL RESPIRATORY (INHALATION) at 07:13

## 2023-03-09 RX ADMIN — LOPERAMIDE HYDROCHLORIDE 2 MG: 2 CAPSULE ORAL at 14:25

## 2023-03-09 RX ADMIN — APIXABAN 2.5 MG: 2.5 TABLET, FILM COATED ORAL at 08:28

## 2023-03-09 RX ADMIN — ACETAMINOPHEN 650 MG: 325 TABLET ORAL at 00:22

## 2023-03-09 RX ADMIN — MONTELUKAST 10 MG: 10 TABLET, FILM COATED ORAL at 08:27

## 2023-03-09 RX ADMIN — LEVOTHYROXINE SODIUM 100 MCG: 0.1 TABLET ORAL at 06:34

## 2023-03-09 RX ADMIN — SEVELAMER CARBONATE 800 MG: 800 TABLET, FILM COATED ORAL at 12:53

## 2023-03-09 RX ADMIN — SODIUM CHLORIDE, PRESERVATIVE FREE 10 ML: 5 INJECTION INTRAVENOUS at 22:30

## 2023-03-09 RX ADMIN — PANTOPRAZOLE SODIUM 40 MG: 40 TABLET, DELAYED RELEASE ORAL at 06:34

## 2023-03-09 ASSESSMENT — PAIN SCALES - WONG BAKER: WONGBAKER_NUMERICALRESPONSE: 0

## 2023-03-09 ASSESSMENT — PAIN SCALES - GENERAL
PAINLEVEL_OUTOF10: 0

## 2023-03-09 ASSESSMENT — PAIN DESCRIPTION - LOCATION: LOCATION: OTHER (COMMENT)

## 2023-03-09 NOTE — PROGRESS NOTES
Bedside shift change report given to YESENIA Velasquez RN (oncoming nurse) by Aisha Breaux RN (offgoing nurse). Report included the following information SBAR, Intake/Output, MAR, Recent Results, Med Rec Status, and Quality Measures. 1930  Shift assessment completed. Pt A&Ox4, lethargic, but cooperative and follows commands. VSS. SR with PAC's and a prolonged QT on telemetry. Lungs clear in all lobes but diminished in bases. BS present in all quadrants but has a loss of appetite. Brief dry and clean. No edema present. Power PICC in R arm patent with blood return in both lumens. 2130  Daughter left room after updates of vitals and plan for transfer to [facility] for a cardiac  cath. Transfer center was called prior to conversation and there are no beds available at this time. [address] dressing changed to R upper arm per protocol. PO Benadryl administered in anticipation of Vancomycin administration. Provider ordered due to pt's allergy to Vancomycin. Will monitor closely for rash or any adverse reactions. Pt aware. Zosyn hung and administering per order. 2345  Vancomycin infusing. Pt educated on any adverse reaction she may have and instructed her to call if she felt anything that was out of the normal for her. Call  bell within reach. 2350  No adverse reaction to Vanc.     2355  No adverse reaction to Vanc.     0005  No adverse reaction to Vanc. 0015  No adverse reaction to Vanc.     0025  No adverse reaction to Vanc. 0045  Vancomycin completed. Pt had no adverse reaction to the administration. Pt assisted with repositioning and was given two warm blankets due to being cold. FSBG 78. Pt was given a container of OJ with 2 sugars in it to keep her from becoming hypoglycemic. Pt tolerated the juice well and did  not C/O nausea. CBWR.     9667  Pt lying in bed with eyes closed. No AD noted. Daughter Monty Blood called and received a status update.      0410  Blood drawn from Power PICC in R upper arm without difficulty. 0400 VSS. SR on telemetry.  Ax temp of 99.2.   FSBG 60.  Apple juice with three sugars diluted in it and half a chicken salad sandwich was given. Pt stated she would try to consume it all.   Provider in unit and is aware.     0430  Pt unable to eat more than a bite of the chicken salad sandwich that was offered. Pt did consume 100% of the apple juice.     0440  Pt given 1mg subcu Glucagon injection in her left thigh. Pt tolerated well. Will re-check glucose at 0500.  Provider aware.     0500  FSBG 132. Provider aware.      0645  Bedside shift change report given to JAY Espinoza RN (oncoming nurse) by YESENIA Velasquez RN (offgoing nurse). Report included the following information SBAR, Intake/Output, MAR, Recent Results, Med Rec Status, and Quality Measures.       .

## 2023-03-09 NOTE — PROGRESS NOTES
CARDIOLOGY PROGRESS NOTE - NP    Patient seen and examined. This is a patient who is followed for atrial flutter with RVR. Extubated 3/7 s/p polymorphic VT. Patient awake and alert. Denies chest pain or shortness of breath. QT stable on ekg this morning. Telemetry reviewed. Currently in sinus rhythm. Pertinent review of systems items noted above, all other systems are negative. Current medications reviewed. Physical Examination  Vital signs are stable. Blood pressure 165/57, Pulse 81  No apparent distress. Heart is regular, rate and rhythm. Normal S1, S2, harsh murmur. Lungs are clear bilaterally. Abdomen is soft, nontender, normal bowel sounds. Extremities have no edema. Labs reviewed: , K 3.8 Magnesium 2.1, Crt 3.81, HGB 8.6, WBC 9.1      Case discussed with Dr. Elenita Rivera and our impression and recommendations are as follows:  Atrial flutter with RVR:    - Currently SR. Off amiodarone due to prolonged Qtc. Continue beta blocker. - Eliquis held for drop in hemoglobin and ?GI bleed. GI saw with no plans for intervention.   - Will resume eliquis when able but previous dose was not optimized as she does not meet criteria for reduced dosing. Should be 5mg BID when restarted post potential LHC/ICD placement. - Echo with preserved EF, grade 2 diastolic dysfunction.    - TSH 13.9. Continue supplementation and have her follow-up with her primary. Altered mental status: Head CT and labs unremarkable. Mental acuity improved. Hypertension:  Trending up. Coreg restarted, uptitrate as needed. Continue to monitor. Mitral regurgitation: +murmur. mild to moderate per echo. Will continue to monitor. Polymorphic Vtach: likely due to prolonged Qtc, improving.   - Review of old cardiac records listed prolonged QT previously.  She had a cardiac cath in 2020 that showed tortuous vessels but no significant CAD.  - Daily EKG to monitor QT; additionally will obtain EKG post dialysis treatments M-W-F  - Given that QT prolonging meds had already been stopped and electrolytes noncontributory, would benefit from [facility] to evaluate for CAD as etiology. If no CAD, will likely need ICD. Hold Eliquis. - Hospitalist at [address] [place] [address] has accepted, awaiting bed assignment. ESRD: on HD, M-W-F. Febrile overnight: Normal WBC/lactic/CXR. Blood cultures pending. Antibiotics empirically started per primary team.    Please do not hesitate to call me or Dr. Zuly Guillen if additional questions arise.

## 2023-03-09 NOTE — PROGRESS NOTES
1756 Report Received from night shift RNYESENIA. Assumed care of pt 0700. Pt A&Ox4, lethargic, follows commands. DAS. VSS.   0900 EKG done. 36: Called transfer center for update. No beds at this time. 1157 BS 72. Orange juice and apple sauce given to patient. Refused to work with PT.  Gary (transfer center) called to update pt has bed assigned   [address] [place] [address] for . ETA 1 hr.   1618: Report given to [address] [place] [address] Rd @ [facility]. [address] arrived report and paperwork handed off to transport team.  /50, Room air O2sat >92%. VSS. AOx4 on discharge. Pt belongings sent with patient. 1722 Pt discharged. 36 Daughter Lewis Cortes updated.

## 2023-03-09 NOTE — THERAPY EVALUATION
PHYSICAL THERAPY EVALUATION    Patient: Ledy Carlson (37 y.o. female)  Date: 3/9/2023  Physical Therapy Time:   PT Individual Minutes  Time In: 1300  Time Out: 1320  Minutes: 20  Timed Minute Breakdown:  PTE: 20         Primary Diagnosis: Hypoglycemia [E16.2]  Nausea and vomiting, unspecified vomiting type [R11.2]  Chronic kidney disease with end stage renal failure on dialysis ([facility] [N18.6, Z99.2] Hypoglycemia  Present on Admission:   Hypoglycemia   Chronic kidney disease with end stage renal failure on dialysis ([facility]   Polymorphic ventricular tachycardia   Hypomagnesemia        Precautions: NA              Conditions Requiring skilled therapeutic intervention:    Decreased mobility    Evaluation Activity Tolerance:    Pt tolerates evaluation with no incident. Pt requires max cuing/encouragement to participate. Equipment Recommendations for Discharge:   NA    AM-PAC   18; Current research shows that an AM-PAC score of 17 or less is typically not associated with a discharge to the patient's home setting, whereas a score of 18 or greater is typically associated with a discharge to the patient's home setting.     Factors which may influence rehabilitation potential include:  Chronicity of Problem, Prior impaired baseline level of function , and Medical condition/comorbidities      PLAN :   Pt to be DC home with      SUBJECTIVE:     See below    OBJECTIVE DATA SUMMARY:     Past Medical History:   Diagnosis Date    JULIET (acute kidney injury) ([facility] 05/09/2019    Anxiety     Arrhythmia     Arthritis     Asthma     Asthma     Burning with urination     frequent uti    Calculus of gallbladder with acute cholecystitis without obstruction 10/09/2020    Cholecystitis 01/12/2019    Chronic kidney disease     dialysis    CMV (cytomegalovirus) antibody positive     Colitis 09/10/2022    COPD (chronic obstructive pulmonary disease) ([facility]     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    Dialysis patient [facility])     M/W/F    Diverticular disease of colon 08/21/2013    Dyspepsia and other specified disorders of function of stomach     stomach ulcer    Elevated troponin 10/21/2019    Encounter for cholecystectomy 05/06/2021 7/2020    Essential hypertension     GERD (gastroesophageal reflux disease)     History of chemotherapy     History of renal transplant 08/21/2013    (LD 2/12/2002)    HLD (hyperlipidemia)     Hypercholesteremia     Hypertension     Hypothyroidism 03/23/2022    Kidney transplant rejection     Menopause     Migraine     Obesity     Osteoporosis     Pancreatitis 08/28/2022    Pyelonephritis 07/13/2020    Recurrent urinary tract infection 09/27/2016    Renal cyst 2002    kidney transplant     Spontaneous bacterial peritonitis ([facility] 01/16/2019    Ulcer      Past Surgical History:   Procedure Laterality Date    CHOLECYSTECTOMY  02/2021    COLONOSCOPY  05/13/2022    COLONOSCOPY      Dr Mikayla Spivey  5yrs ago    COLONOSCOPY      with Dr. Lorenzo Spivey  5 yrs ago    GI      ulcer    HEENT      sinus surg    KIDNEY TRANSPLANT      OTHER SURGICAL HISTORY  02/21/2022    SIGMOIDOSCOPY, FLEXIBLE;  Surgeon: Harmeet Ye MD    TRANSPLANT      kidney transplant 2002    VASCULAR SURGERY      shunt in prep for dialysis        Orientation and Cognition:   Orientation  Overall Orientation Status: Within Normal Limits    Cognition  Overall Cognitive Status: WNL    Observations:   Observation/Palpation  Posture: Poor    Gross Assessment  Gross Assessment  Strength: Generally decreased, functional  Coordination: Generally decreased, functional      AROM: Generally decreased, functional     PROM: Generally decreased, functional     Strength: Generally decreased, functional BLE 4/5       Functional Mobility:  Bed Mobility:  Bed mobility  Bridging: Modified independent   Rolling to Left: Modified independent  Rolling to Right: Modified independent  Supine to Sit: Stand by assistance  Sit to Supine: Independent  Scooting: Stand by assistance    Transfers:  Transfers  Sit to Stand: Modified independent  Stand to Sit: Modified independent    Balance:   Supine to Sit: Stand by assistance    Ambulation/Stair navigation:  Ambulation  Surface: Level tile  Device: Rolling Walker  Assistance: Contact guard assistance  Quality of Gait: shuffling  Gait Deviations: Shuffles  Distance: 2  Comments: Pt requires max cuing/encouragment      Balance:  Sitting: Intact  Standing Static: Intact  Standing Dynamic: Intact    Today's Tx:   Pt agrees to participate in PT evaluation today. Pt requires max cues and max encouragement to participate. Pt performs bed mobility of rolling L/R, scooting, and supine to sitting EOB with Mod I.  .  Pt transfers STS from bed to 2WW 3x with Mod I. She ambulates with shuffling gait 2' in ICU room then states that she is tired and is needs to lie down and rest. Pt performs sitting EOB to supine independently with little difficulty. Pt is educated on benefits of exercise, AD use, and safety awareness. Pain:  Subjective  Subjective: Pt states, \"I feel sick. I don't want to move too much. \"  Pain: 0/10    After treatment:   []         Patient left in no apparent distress sitting up in chair  [x]         Patient left in no apparent distress in bed  [x]         Call bell left within reach  [x]         Nursing notified  []         Caregiver/Family present  []         Bed alarm activated  []         SCDs applied    COMMUNICATION/EDUCATION:   Patient Education  Education Given To: Patient  Education Provided: Role of Therapy;Plan of Care  Education Method: Verbal    PT GOALS:     Goal 1: Pt will safely ambulate 22' with 2WW and CGA     Goal 2: Pt will safely transfer STS 5x with Mod I     Goal 3: Pt will safely ambulate 48' with 2WW and Mod I     Thank you for this referral.  Therapist Signature: Jailyn Ralph, PT, DPT

## 2023-03-09 NOTE — PLAN OF CARE
Problem: Discharge Planning  Goal: Discharge to home or other facility with appropriate resources  Outcome: Progressing  Flowsheets (Taken 3/8/2023 1930)  Discharge to home or other facility with appropriate resources: Arrange for needed discharge resources and transportation as appropriate     Problem: Nutrition Deficit:  Goal: Optimize nutritional status  3/8/2023 2303 by Bhaskar Isbell RN  Outcome: Progressing  3/8/2023 1738 by Liset Eaton  Outcome: Not Progressing  Flowsheets (Taken 3/8/2023 1722)  Nutrient intake appropriate for improving, restoring, or maintaining nutritional needs:   Recommend appropriate diets, oral nutritional supplements, and vitamin/mineral supplements   Monitor oral intake, labs, and treatment plans   Assess nutritional status and recommend course of action

## 2023-03-09 NOTE — PROGRESS NOTES
Pharmacy Service: Antibiotic Monitoring    Day of therapy: 2 of 7 (start date: 3/8/23)    Indication:  Suspected blood stream infection- empiric coverage started     Current regimen:  Vancomycin 1gram - will pulse dose based on trough level due to renal function and varying dialysis schedule  Zosyn - renally adjusted. BP (!) 165/57   Pulse 81   Temp 98.7 °F (37.1 °C) (Oral)   Resp 25   Ht 5' 1\" (1.549 m)   Wt 137 lb 14.4 oz (62.6 kg)   SpO2 99%   BMI 26.06 kg/m²       Labs:     Recent Labs     03/08/23  0320 03/09/23  0357   WBC 10.0 9.1        Estimated Creatinine Clearance Estimated Creatinine Clearance: 12 mL/min (A) (based on SCr of 3.81 mg/dL (H)). Cultures:      Impression/Plan:  Will pulse dose Vancomycin based on morning trough levels that will be drawn with AM labs at 0400 daily. Zosyn renally adjusted    Will monitor culture results.         Thank you,  Edy Luna, [facility]  [phone]

## 2023-03-09 NOTE — PROGRESS NOTES
Physician Progress Note      PATIENT:               Mike Mcmillan  CSN #:                  [id number]  :                       1955  ADMIT DATE:       3/1/2023 4:08 AM  [address] [place] DATE:  RESPONDING  PROVIDER #:        Parviz Cm MD          QUERY TEXT:    Dear Hospitalist  Patient admitted with intractable  N&V. Noted to have  severe malnutrition   after evaluated by nutritionist.  If possible, please document in progress   notes and discharge summary if you are evaluating and /or treating any of the   following: The medical record reflects the following:  Risk Factors: Pt intake remains 0% meals and supplements; nursing reports pt   takes medications over a period of time due to Nausea. Nausea medications   given and pt sleeps most of the day. Clinical Indicators:   Severe malnutrition (23)  Context:      Chronic Condition  Findings of the 6 clinical characteristics of malnutrition:  Energy Intake:  75% or less estimated energy requirements for 1 month or   longer  Weight Loss:  No significant weight loss  Body Fat Loss:  Mild body fat loss Triceps  Muscle Mass Loss:  Severe muscle mass loss Temples (temporalis), Clavicles   (pectoralis & deltoids), Hand (interosseous)  Fluid Accumulation:  Severe Generalized   Strength:  Not Performed  Treatment:  Recommend EN , If warranted begin Nepro via NGT at 20 ml/hr . If   EN not warranted, continue to offer and encourage PO food and supplements,   change diet to regular to see if appetite/intake can be improved    ASPEN Criteria:    https://aspenjournals. onlinelibrary. mayes. com/doi/full/10.1177/076947441882445  5    Thank you,   Mendy Payne RN   CCDS  Options provided:  -- Protein calorie malnutrition severe  -- Other - I will add my own diagnosis  -- Disagree - Not applicable / Not valid  -- Disagree - Clinically unable to determine / Unknown  -- Refer to Clinical Documentation Reviewer    PROVIDER RESPONSE TEXT:    This patient has severe protein calorie malnutrition. Query created by: Obie Rodriguez on 3/8/2023 8:16 AM      QUERY TEXT:    Dear Hospitalist  Pt admitted with intractable  N&V. Pt developed  GI  bleeding and has anemia   documented. If possible, please document in progress notes and discharge   summary further specificity regarding the acuity and type of anemia:    The medical record reflects the following:  Risk Factors: rectal bleeding;  PAF  on  eliquis. Clinical Indicators: H&H  dropped to   5.7/18.5  ; per  GI- Anemia. Her   hemoglobin/hematocrit are 5.7 and 18. She has transfusions ordered  Treatment: transfusion x 1 unit . Thank you,   Marilee Vallecillo RN   CCDS  Options provided:  -- Anemia due to acute blood loss  -- Anemia due to chronic blood loss  -- Anemia due to acute on chronic blood loss  -- Other - I will add my own diagnosis  -- Disagree - Not applicable / Not valid  -- Disagree - Clinically unable to determine / Unknown  -- Refer to Clinical Documentation Reviewer    PROVIDER RESPONSE TEXT:    This patient has acute blood loss anemia.     Query created by: Obie Rodriguez on 3/8/2023 8:23 AM      Electronically signed by:  Pro Owens MD 3/9/2023 8:03 AM

## 2023-03-09 NOTE — DISCHARGE SUMMARY
Hospitalist Discharge Summary     Patient ID:    Marcos Ohara  [id number]  74 y.o.  1955    Admit date: 3/1/2023    Discharge date : 3/9/2023    Chronic Diagnoses:  22    Final Diagnoses:   Principal Problem:    Hypoglycemia  Active Problems:    Chronic kidney disease with end stage renal failure on dialysis ([facility] [address]    Polymorphic ventricular tachycardia    Hypomagnesemia  Resolved Problems:    * No resolved hospital problems. *      Reason for Hospitalization:    Marcos Ohara is a 74 y.o. female  with ESRD HD, failed renal transplant, HTN, HLP, Hypothyroididsm, and CMV+, who represented herself to the ED today with c/o nausea, vomit, abdominal cramps x 3 days, No missed HD, states HD series is q MF only at [facility]. Was here in ED 3 days ago requesting admit for same complaints but was discharged home, she has not been eating, she appears withdrawn, Hx is limited because she appears withdrawn, not wanting to communicate, or partake in her care, she states she has been Just nauseous, no chest pain, sob, fever or chills, no other complaints voiced. During admission patient cardiac arrested and was intubated, but was extubated the next day, Patient cardiac arrested after having episodes of V-tachycardia, patient also has QT prolongation per EKG. Patient is currently stable on room air and is stable for transfer to [facility] for [facility], patient has been accepted by Dr Lucia Vera.      Cardiac arrest due to V Tach  - pt was intubated due to cardiac arrest x 2 3/6, due to what looks like polymorphic VT- extubated on 3.7, dc'd amoidarne and all other qtc prolonging drugs  - pt started having peaked twaves, and more prlonged qtc interval earlier in the day, then had bp on lower side during HD  - shortly after HD is when events occurred, 1st resolved with precordial thump, 2nd required defibrillator, followed by intubation  - we have iv lidocaine ready for administration if VT were to recurr but pt quite stable from cardiac stand point at this time  - she will need cardiac cath per cardiology, and she is being tranferred for same, I attempted to resume her eliquis, but cardioloyg prefers she stay off until determine if she needs icd     Fevers- 102 on 3/8  - unexplained at this time  - cxr without pna  - no ua due to anuric  - bld cult sent  - empiric abx started while we watch cultures  - lactate 2.0  - no other sirs criteria this am     Hypoglycemia  - due to poor po intake  - dextrose/glucagon as needed  - encouraged to eat, trying to address all issue preventing her from eating, such as nausea, diarrhea        metabolic encephalopathy- most awake and interative thus far this am  - resolved with HD     Depression  - will discuss with pharmacy which SSRI has the least possibe arrhymic effect, and start, this was discussed with pt, she agrees to starting ssri        Atrial Fibrillation: resolved, noac on hold due to gi bleeding and now for upcoming cardiace procedures (cath, possible icd)  - secondary to the patient not taking her medication due uremic anorexia and vomiting  -transferred to ICU for closer monitor, spIV Amiodarone  - Converted to NSR, transition to PO after HD and her vomiting improved   - po amio stopped due to prolonged qtc interval  - resume eliquis when ok with cardiology, watch for gi bleedinng  - dc zofran due qtc issues        Intractable nausea, vomit, abdominal cramps, diarrhea  - reviewed thoughtful gi consult, very little to offer her though besides aggressive general medical mgmt  - asked her if she would want PEG tube if she coulnd't eat, she does not want this at this time  - her chronic diarrhea may be contributing to her lack of eating, will trial imodium after discussion with GI, GI will be much more ableto do procedures after her cardiac workup  - ok to use compazine        ESRD- getting HD during my ecam  -nephrologist for HD Hypertension:  -currently hypotensive, holding all meds  -PRN Hydralazine      Depression:  -not wanting to eat, or drink, or get oob,  Non-suicidal.  -fu pcp, consider ssri and psych eval     Hypothyroidism  -current TSH 13.90, increase from most recent TSH of 12.52  -synthroid increased to 100mcg during this admission     Hyponatremia  - improved after HD      Anemia due to gi blood loss  - sp 1 unit pRBC, great response after 1 unit, now 8.6       Total Time Spent: 25 minutes    Current Facility-Administered Medications:     Vancomycin-Pharmacy to Dose, , Other, RX Placeholder, LISA Cornejo NP    carvedilol (COREG) tablet 6.25 mg, 6.25 mg, Oral, BID WC, Guille Waller MD, 6.25 mg at 03/09/23 1556    piperacillin-tazobactam (ZOSYN) 3,375 mg in sodium chloride 0.9 % 50 mL IVPB ([id number]), 3,375 mg, IntraVENous, Q12H, LISA Acosta NP    loperamide (IMODIUM) capsule 2 mg, 2 mg, Oral, TID, Guille Waller MD, 2 mg at 03/09/23 1425    mirtazapine (REMERON) tablet 15 mg, 15 mg, Oral, Nightly, Guille Waller MD    nitroGLYCERIN (NITROSTAT) SL tablet 0.4 mg, 0.4 mg, SubLINGual, Q5 Min PRN, Oluwabunmi S Oba, APRN - NP    diphenhydrAMINE (BENADRYL) tablet 25 mg, 25 mg, Oral, Q6H PRN, LISA Cornejo NP, 25 mg at 03/08/23 2211    propofol injection, 5-50 mcg/kg/min, IntraVENous, Continuous, LISA Hoskins CNP, Last Rate: 7.7 mL/hr at 03/07/23 0913, 20 mcg/kg/min at 03/07/23 0913    succinylcholine (ANECTINE) injection 200 mg, 200 mg, IntraVENous, Once, LISA Hernandez CNP    lidocaine 2000 mg in dextrose 5% 500 mL infusion, 1 mg/min, IntraVENous, Continuous, Minta Life, PA    albuterol (PROVENTIL) nebulizer solution 2.5 mg, 2.5 mg, Nebulization, Q4H PRN, Christiana Miller, LISA - CNP    lidocaine 4 % external patch 1 patch, 1 patch, TransDERmal, Daily, LISA Cornejo - NP, 1 patch at 03/09/23 0827    prochlorperazine (COMPAZINE) injection 10 mg, 10 mg, IntraVENous, Q6H PRN, Jonas Adams APRN - CNP, 10 mg at 03/09/23 1253    [Held by provider] apixaban (ELIQUIS) tablet 2.5 mg, 2.5 mg, Oral, BID, Stefani Caban, APRN - NP, 2.5 mg at 03/09/23 8894    acetaminophen (TYLENOL) tablet 650 mg, 650 mg, Oral, Q6H PRN, Jonas Adams APRN - CNP, 650 mg at 03/09/23 0022    acetaminophen (TYLENOL) suppository 650 mg, 650 mg, Rectal, Q6H PRN, Jonas Adams APRN - CNP, 650 mg at 03/07/23 0449    levothyroxine (SYNTHROID) tablet 100 mcg, 100 mcg, Oral, [facility], Christiana Miller, APRN - CNP, 100 mcg at 03/09/23 0634    albuterol sulfate HFA (PROVENTIL;VENTOLIN;PROAIR) 108 (90 Base) MCG/ACT inhaler 1 puff, 1 puff, Inhalation, Q4H PRN, Johann Jackson MD, 1 puff at 03/04/23 2211    aluminum & magnesium hydroxide-simethicone (MAALOX) 200-200-20 MG/5ML suspension 10 mL, 10 mL, Oral, Q6H PRN, Terrie Sandhoff, MD, 10 mL at 03/05/23 1722    cinacalcet (SENSIPAR) tablet 30 mg, 30 mg, Oral, Daily, Terrie Sandhoff, MD, 30 mg at 03/09/23 0827    pantoprazole (PROTONIX) tablet 40 mg, 40 mg, Oral, [facility], Terrie Sandhoff, MD, 40 mg at 03/09/23 0634    [Held by provider] losartan (COZAAR) tablet 50 mg, 50 mg, Oral, Daily, Terrie Sandhoff, MD, 50 mg at 03/06/23 0842    montelukast (SINGULAIR) tablet 10 mg, 10 mg, Oral, Daily, Terrie Sandhoff, MD, 10 mg at 03/09/23 0827    sevelamer (RENVELA) tablet 800 mg, 800 mg, Oral, TID, Terrie Sandhoff, MD, 800 mg at 03/09/23 1253    glucose chewable tablet 16 g, 4 tablet, Oral, PRN, Terrie Sandhoff, MD, 16 g at 03/01/23 1945    dextrose bolus 10% 125 mL, 125 mL, IntraVENous, PRN **OR** dextrose bolus 10% 250 mL, 250 mL, IntraVENous, PRN, Terrie Sandhoff, MD    glucagon injection 1 mg, 1 mg, SubCUTAneous, PRN, Terrie Sandhoff, MD, 1 mg at 03/09/23 0437    dextrose 10 % infusion, , IntraVENous, Continuous PRN, Terrie Sandhoff, MD    mometasone-formoterol (DULERA) 200-5 MCG/ACT inhaler 2 puff, 2 puff, Inhalation, BID, Manish Cárdenas, APRN - NP, 2 puff at 03/09/23 0713         Follow up Care:    TBD     Patient Follow Up Instructions: Activity: bedrest  Diet:  cardiac diet    Condition at Discharge:  Stable  __________________________________________________________________    Disposition  [facility]  ____________________________________________________________________    Code Status:  Full  ___________________________________________________________________    Discharge Exam:  Patient seen and examined by me on discharge day. Pertinent Findings:  Gen:    Not in distress  Chest: Clear lungs  CVS:   Regular rhythm. No edema  Abd:  Soft, not distended, not tender  Neuro:  Alert    CONSULTATIONS: cardiology, nephrologist, Dietician     Significant Diagnostic Studies:   Recent Results (from the past 24 hour(s))   Culture, Blood 1    Collection Time: 03/08/23  5:20 PM    Specimen: Blood   Result Value Ref Range    Special Requests No Special Requests      Culture No growth after 13 hours     Culture, Blood 2    Collection Time: 03/08/23  5:30 PM    Specimen: Blood   Result Value Ref Range    Special Requests No Special Requests      Culture No growth after 13 hours     Lactic Acid    Collection Time: 03/08/23  7:50 PM   Result Value Ref Range    Lactic Acid, Plasma 2.0 0.4 - 2.0 mmol/L   POCT Glucose    Collection Time: 03/08/23  8:40 PM   Result Value Ref Range    POC Glucose 90 70 - 110 mg/dL    Performed by: Ron Gonzalez    POCT Glucose    Collection Time: 03/09/23 12:44 AM   Result Value Ref Range    POC Glucose 78 70 - 110 mg/dL    Performed by:  Jaison Campbell    Basic Metabolic Panel    Collection Time: 03/09/23  3:57 AM   Result Value Ref Range    Sodium 134 (L) 136 - 145 mmol/L    Potassium 3.8 3.5 - 5.5 mmol/L    Chloride 100 100 - 111 mmol/L    CO2 29 21 - 32 mmol/L    Anion Gap 5 3.0 - 18.0 mmol/L    Glucose 84 74 - 99 mg/dL    BUN 17 7 - 18 mg/dL    Creatinine 3.81 (H) 0.60 - 1.30 mg/dL    Bun/Cre Ratio 4 (L) 12 - 20      Est, Glom Filt Rate 13 (L) >60 ml/min/1.73m2    Calcium 7.2 (L) 8.5 - 10.1 mg/dL   Magnesium    Collection Time: 03/09/23  3:57 AM   Result Value Ref Range    Magnesium 2.1 1.6 - 2.6 mg/dL   CBC with Auto Differential    Collection Time: 03/09/23  3:57 AM   Result Value Ref Range    WBC 9.1 4.6 - 13.2 K/uL    RBC 2.73 (L) 4.20 - 5.30 M/uL    Hemoglobin 8.6 (L) 12.0 - 16.0 g/dL    Hematocrit 26.6 (L) 35.0 - 45.0 %    MCV 97.4 78.0 - 100.0 FL    MCH 31.5 24.0 - 34.0 PG    MCHC 32.3 31.0 - 37.0 g/dL    RDW 17.7 (H) 11.6 - 14.5 %    Platelets 976 126 - 823 K/uL    MPV 9.7 9.2 - 11.8 FL    Nucleated RBCs 0.0 0.0  WBC    nRBC 0.00 0.00 - 0.01 K/uL    Seg Neutrophils 61 40 - 73 %    Lymphocytes 20 (L) 21 - 52 %    Monocytes 16 (H) 3 - 10 %    Eosinophils % 2 0 - 5 %    Basophils 1 0 - 2 %    Immature Granulocytes 0 0 - 0.5 %    Segs Absolute 5.5 1.8 - 8.0 K/UL    Absolute Lymph # 1.8 0.9 - 3.6 K/UL    Absolute Mono # 1.5 (H) 0.05 - 1.2 K/UL    Absolute Eos # 0.2 0.0 - 0.4 K/UL    Basophils Absolute 0.1 0.0 - 0.1 K/UL    Absolute Immature Granulocyte 0.0 0.00 - 0.04 K/UL    Differential Type AUTOMATED     Phosphorus    Collection Time: 03/09/23  3:57 AM   Result Value Ref Range    Phosphorus 1.8 (L) 2.5 - 4.9 mg/dL   POCT Glucose    Collection Time: 03/09/23  4:00 AM   Result Value Ref Range    POC Glucose 60 (L) 70 - 110 mg/dL    Performed by: Ron Gonzalez    POCT Glucose    Collection Time: 03/09/23  5:02 AM   Result Value Ref Range    POC Glucose 132 (H) 70 - 110 mg/dL    Performed by:  Ron Gonzalez    POCT Glucose    Collection Time: 03/09/23  7:29 AM   Result Value Ref Range    POC Glucose 169 (H) 70 - 110 mg/dL    Performed by: Lucian Burnham    EKG 12 Lead    Collection Time: 03/09/23  9:13 AM   Result Value Ref Range    Ventricular Rate 84 BPM    Atrial Rate 84 BPM    P-R Interval 172 ms    QRS Duration 88 ms    Q-T Interval 454 ms    QTc Calculation (Bazett) 536 ms    P Axis 25 degrees    R Axis 58 degrees    T Axis 91 degrees    Diagnosis Normal sinus rhythm    POCT Glucose    Collection Time: 03/09/23 11:45 AM   Result Value Ref Range    POC Glucose 72 70 - 110 mg/dL    Performed by: Shay Puente    POCT Glucose    Collection Time: 03/09/23  1:56 PM   Result Value Ref Range    POC Glucose 87 70 - 110 mg/dL    Performed by: Shay Puente    POCT Glucose    Collection Time: 03/09/23  3:55 PM   Result Value Ref Range    POC Glucose 79 70 - 110 mg/dL    Performed by: Shay Puente      XR CHEST PORTABLE   Final Result   No acute findings.      XR CHEST PORTABLE   Final Result   Satisfactory endotracheal tube and enteric tube placement. Stable   mild diffuse interstitial opacities.          CT HEAD WO CONTRAST   Final Result   No acute intracranial abnormality.            XR CHEST PORTABLE   Final Result      No retained foreign body or pneumothorax   Mild interstitial edema   The mediastinum is not optimally evaluated on portable radiography      XR CHEST PORTABLE   Final Result   No acute process.        Signed:  LISA Hoskins - CNP  3/9/2023  4:40 PM

## 2023-03-09 NOTE — PROGRESS NOTES
[facility] Pharmacokinetic Monitoring Service - Vancomycin    Spring Anderson is a 74 y.o. female starting on vancomycin therapy for FEVER. Pharmacy consulted by CHRIS Parks for monitoring and adjustment. Target Concentration: Pre-Dialysis Concentration 15-20 mg/L  Additional Antimicrobials: Zosyn    Pertinent Laboratory Values: Wt Readings from Last 1 Encounters:   03/08/23 137 lb 11.2 oz (62.5 kg)     Temp Readings from Last 1 Encounters:   03/09/23 99.2 °F (37.3 °C) (Axillary)     Recent Labs     03/08/23  0320 03/09/23  0357   CREATININE 5.98* 3.81*   WBC 10.0 9.1       Plan:  Concentration-guided dosing due to renal impairment and intermittent hemodialysis   Start Vancomycin 1000 mg IV on 3/8/2023@ 2245. Pharmacy will continue to monitor patient and adjust therapy as indicated and order necessary labs accordingly.     Thank you for the consult,  Xenia Chopra, [address]  3/9/2023 5:00 AM

## 2023-03-09 NOTE — PROGRESS NOTES
Hospitalist Progress Note             Date of Service:  7270  NAME:  Nehemias Roque  :  [id number]  MRN:  [id number]    Admission Summary:   Nehemias Roque is a 74 y.o. female  with ESRD HD, failed renal transplant, HTN, HLP, Hypothyroididsm, and CMV+, who represented herself to the ED today with c/o nausea, vomit, abdominal cramps x 3 days, No missed HD, states HD series is q MF only at Dr Colton [facility]. Was here in ED 3 days ago requesting admit for same complaints but was discharged home, she has not been eating, she appears withdrawn, Hx is limited because she appears withdrawn, not wanting to communicate, or partake in her care, she states she has been Just nauseous, no chest pain, sob, fever or chills, no other complaints voiced. Last BM was this morning and loose. Hospitalist was asked to admit.        Interval history / Subjective:     Pt intubated late last night after 2 cardiac arrests      Assessment & Plan:         Cardiac arrest due to V Tach  - pt was intubated due to cardiac arrest x 2 3/6, due to what looks like polymorphic VT- extubated on 3., dc'd amoidarne and all other qtc prolonging drugs  - pt started having peaked twaves, and more prlonged qtc interval earlier in the day, then had bp on lower side during HD  - shortly after HD is when events occurred, 1st resolved with precordial thump, 2nd required defibrillator, followed by intubation  - we have iv lidocaine ready for administration if VT were to recurr but pt quite stable from cardiac stand point at this time  - she will need cardiac cath per cardiology, and she is being tranferred for same, I attempted to resume her eliquis, but cardioloyg prefers she stay off until determine if she needs icd    Fevers- 102 on 3/8  - unexplained at this time  - cxr without pna  - no ua due to anuric  - bld cult sent  - empiric abx started while we watch cultures  - lactate 2.0  - no other sirs criteria this am    Hypoglycemia  - due to poor po intake  - dextrose/glucagon as needed  - encouraged to eat, trying to address all issue preventing her from eating, such as nausea, diarrhea        metabolic encephalopathy- most awake and interative thus far this am  - resolved with HD    Depression  - will discuss with pharmacy which SSRI has the least possibe arrhymic effect, and start, this was discussed with pt, she agrees to starting ssri       Atrial Fibrillation: resolved, noac on hold due to gi bleeding and now for upcoming cardiace procedures (cath, possible icd)  - secondary to the patient not taking her medication due uremic anorexia and vomiting  -transferred to ICU for closer monitor, spIV Amiodarone  - Converted to NSR, transition to PO after HD and her vomiting improved   - po amio stopped due to prolonged qtc interval  - resume eliquis when ok with cardiology, watch for gi bleedinng  - dc zofran due qtc issues        Intractable nausea, vomit, abdominal cramps, diarrhea  - reviewed thoughtful gi consult, very little to offer her though besides aggressive general medical mgmt  - asked her if she would want PEG tube if she coulnd't eat, she does not want this at this time  - her chronic diarrhea may be contributing to her lack of eating, will trial imodium after discussion with GI, GI will be much more ableto do procedures after her cardiac workup  - ok to use compazine        ESRD- getting HD during my ecam  -nephrologist for HD      Hypertension:  -currently hypotensive, holding all meds  -PRN Hydralazine      Depression:  -not wanting to eat, or drink, or get oob,  Non-suicidal.  -fu pcp, consider ssri and psych eval     Hypothyroidism  -current TSH 13.90, increase from most recent TSH of 12.52  -synthroid increased to 100mcg during this admission     Hyponatremia  - improved after HD         Anemia due to gi blood loss  - sp 1 unit pRBC, great response after 1 unit,  now 8.6     Start PT  Encourage po intake  Watch on tele for more vtach  Awaiting bed for cath      Review of Systems:   Pertinent items are noted in HPI. She is more awake and interactive today, tries to eat but immediately becomes nausea with excess secretions in her mouth, otherwise no complaints      Vital Signs:    Last 24hrs VS reviewed since prior progress note. Most recent are:  Vitals:    03/09/23 0829   BP: (!) 165/57   Pulse: 81   Resp:    Temp:    SpO2:          Intake/Output Summary (Last 24 hours) at 3/9/2023 0946  Last data filed at 3/8/2023 1712  Gross per 24 hour   Intake 500 ml   Output 2300 ml   Net -1800 ml        Physical Examination:             General:          Alert, cooperative, no distress, appears stated age. HEENT:           Atraumatic, anicteric sclerae, pink conjunctivae                          No oral ulcers, mucosa moist, throat clear, dentition fair  Neck:               Supple, symmetrical  Lungs:             Clear to auscultation bilaterally. No Wheezing or Rhonchi. No rales. Chest wall:      No tenderness  No Accessory muscle use. Heart:              Regular  rhythm,  No  murmur   No edema  Abdomen:        Soft, non-tender. Not distended. Bowel sounds normal  Extremities:     No cyanosis. No clubbing,                            Skin turgor normal, Capillary refill normal  Skin:                Not pale. Not Jaundiced  No rashes   Psych:             Not anxious or agitated.   Neurologic:      Alert, moves all extremities, answers questions appropriately and responds to commands        Data Review:    Review and/or order of clinical lab test  Review and/or order of tests in the radiology section of CPT  Review and/or order of tests in the medicine section of CPT      Labs:     Recent Labs     03/08/23  0320 03/09/23  0357   WBC 10.0 9.1   HGB 9.0* 8.6*   HCT 27.2* 26.6*    223     Recent Labs     03/07/23  0253 03/07/23  0930 03/08/23  0320 03/09/23  0357   * --  134* 134*   K 3.5  --  4.0 3.8   CL 93*  --  98* 100   CO2 27  --  25 29   BUN 20*  --  35* 17   MG 3.0* 2.8* 2.6 2.1   PHOS  --   --  2.4* 1.8*     No results for input(s): ALT, TP, ALB, GLOB, GGT, AML in the last 72 hours. Invalid input(s): SGOT, GPT, AP, TBIL, TBILI, AMYP, LPSE, HLPSE  No results for input(s): INR, APTT in the last 72 hours. Invalid input(s): PTP   No results for input(s): TIBC, FERR in the last 72 hours. Invalid input(s): FE, PSAT   No results found for: FOL, RBCF   No results for input(s): PH, PCO2, PO2 in the last 72 hours. No results for input(s): CPK in the last 72 hours.     Invalid input(s): CPKMB, CKNDX, TROIQ  Lab Results   Component Value Date/Time    CHOL 129 01/20/2022 03:46 PM    HDL 76 01/20/2022 03:46 PM     No results found for: GLUCPOC  [unfilled]      Medications Reviewed:     Current Facility-Administered Medications   Medication Dose Route Frequency    Vancomycin-Pharmacy to Dose   Other RX Placeholder    carvedilol (COREG) tablet 6.25 mg  6.25 mg Oral BID WC    piperacillin-tazobactam (ZOSYN) 3,375 mg in sodium chloride 0.9 % 50 mL IVPB (Bjze2Oni)  3,375 mg IntraVENous Q12H    cholestyramine light packet 4 g  4 g Oral BID    nitroGLYCERIN (NITROSTAT) SL tablet 0.4 mg  0.4 mg SubLINGual Q5 Min PRN    diphenhydrAMINE (BENADRYL) tablet 25 mg  25 mg Oral Q6H PRN    propofol injection  5-50 mcg/kg/min IntraVENous Continuous    succinylcholine (ANECTINE) injection 200 mg  200 mg IntraVENous Once    lidocaine 2000 mg in dextrose 5% 500 mL infusion  1 mg/min IntraVENous Continuous    albuterol (PROVENTIL) nebulizer solution 2.5 mg  2.5 mg Nebulization Q4H PRN    lidocaine 4 % external patch 1 patch  1 patch TransDERmal Daily    prochlorperazine (COMPAZINE) injection 10 mg  10 mg IntraVENous Q6H PRN    [Held by provider] apixaban (ELIQUIS) tablet 2.5 mg  2.5 mg Oral BID    acetaminophen (TYLENOL) tablet 650 mg  650 mg Oral Q6H PRN    acetaminophen (TYLENOL) suppository 650 mg  650 mg Rectal Q6H PRN    levothyroxine (SYNTHROID) tablet 100 mcg  100 mcg Oral QAM AC    albuterol sulfate HFA (PROVENTIL;VENTOLIN;PROAIR) 108 (90 Base) MCG/ACT inhaler 1 puff  1 puff Inhalation Q4H PRN    aluminum & magnesium hydroxide-simethicone (MAALOX) 200-200-20 MG/5ML suspension 10 mL  10 mL Oral Q6H PRN    cinacalcet (SENSIPAR) tablet 30 mg  30 mg Oral Daily    pantoprazole (PROTONIX) tablet 40 mg  40 mg Oral QAM AC    [Held by provider] losartan (COZAAR) tablet 50 mg  50 mg Oral Daily    montelukast (SINGULAIR) tablet 10 mg  10 mg Oral Daily    sevelamer (RENVELA) tablet 800 mg  800 mg Oral TID    glucose chewable tablet 16 g  4 tablet Oral PRN    dextrose bolus 10% 125 mL  125 mL IntraVENous PRN    Or    dextrose bolus 10% 250 mL  250 mL IntraVENous PRN    glucagon injection 1 mg  1 mg SubCUTAneous PRN    dextrose 10 % infusion   IntraVENous Continuous PRN    mometasone-formoterol (DULERA) 200-5 MCG/ACT inhaler 2 puff  2 puff Inhalation BID     ______________________________________________________________________  EXPECTED LENGTH OF STAY: [unfilled]  ACTUAL LENGTH OF STAY:          7                 Lazarus Layer, MD

## 2023-03-09 NOTE — PLAN OF CARE
Comprehensive Nutrition Assessment    Type and Reason for Visit:  Reassess    Nutrition Recommendations/Plan:   Regular diet, consider full liquid diet  until solid food tolerated  Ensure Clear and served over ice  Prosource No Carb mixed in cream Soup   Offer Diet Coke daily to aid in phosphorus intake     Malnutrition Assessment:  Malnutrition Status:  Severe malnutrition (03/03/23 2120)    Context:      Chronic Condition  Findings of the 6 clinical characteristics of malnutrition:  Energy Intake:  75% or less estimated energy requirements for 1 month or longer  Weight Loss:  No significant weight loss     Body Fat Loss:  Mild body fat loss Triceps   Muscle Mass Loss:  Severe muscle mass loss Temples (temporalis), Clavicles (pectoralis & deltoids), Hand (interosseous)  Fluid Accumulation:  Severe Generalized   Strength:  Not Performed    Nutrition Assessment:    Pt had hypoglycemic episode this am due to refusing food.  Apple juice and applesauce with sugar was administered to improve glucose.  Pt refusing most foods and fluids but was willing to sip on Ensure Clear with ice, water and Prosource No Carb added.  Loose stools improved with imodium and provider is starting Remeron for depression which may have the side effect of improving appetite.   Pt is willing to sip on fluids more than eat.  Phosphorus 1.8 - suggest peanut butter, milk or daily - pt refused.  Pt may tolerate diet coke to help with phosphorus.  EN not warranted per GI,  PN not warranted due to functional GI.    Nutrition Related Findings:    Glucose variable due to poor intake.  Provider hopeful Remeron will aide in appetite and depression. Wound Type: None       Current Nutrition Intake & Therapies:    Average Meal Intake: Unable to assess  Average Supplements Intake: 0%  ADULT DIET; Regular; 3 carb choices (45 gm/meal)  ADULT ORAL NUTRITION SUPPLEMENT; Breakfast, Lunch, Dinner; Renal Oral Supplement    Anthropometric Measures:  Height: 5'  1\" (154.9 cm)  Ideal Body Weight (IBW): 105 lbs (48 kg)    Admission Body Weight: 156 lb 1.4 oz (70.8 kg)  Current Body Weight: 137 lb 12.6 oz (62.5 kg), 148.7 % IBW. Weight Source: Bed Scale  Current BMI (kg/m2): 26  Usual Body Weight: 150 lb (68 kg)  % Weight Change (Calculated): -5.3  Weight Adjustment For: No Adjustment      BMI Categories: Overweight (BMI 25.0-29. 9)    Estimated Daily Nutrient Needs:  Energy Requirements Based On: Kcal/kg     Energy (kcal/day): 8684-5274 Kcal/d (25-30 Kcal/kg)     Protein (g/day): 65-78 gm/d (1.0-1.2 gm/kg))  Method Used for Fluid Requirements: 1 ml/kcal  Fluid (ml/day): 1500 ml/d    Nutrition Diagnosis: In context of chronic illness, Severe malnutrition related to endocrine dysfuntion, renal dysfunction, early satiety, psychological cause or life stress as evidenced by Criteria as identified in malnutrition assessment, intake 0-25%, vomiting, poor dentition, nausea    Nutrition Interventions:   Food and/or Nutrient Delivery: Modify Current Diet, Modify Oral Nutrition Supplement  Nutrition Education/Counseling: Education declined  Coordination of Nutrition Care: Continue to monitor while inpatient  Plan of Care discussed with: Provider , nurse, pharmacist, Patient    Goals:  Previous Goal Met: No Progress toward Goal(s)  Goals: PO intake 50% or greater, prior to discharge  Specify Other Goals: if no improvement with supplements , consider EN    Nutrition Monitoring and Evaluation:   Behavioral-Environmental Outcomes: Readiness for Change  Food/Nutrient Intake Outcomes: Food and Nutrient Intake, Supplement Intake  Physical Signs/Symptoms Outcomes: Biochemical Data, Nausea or Vomiting, Diarrhea, Nutrition Focused Physical Findings, Weight    Discharge Planning:     Too soon to determine     Julianne Larose RD  Contact: [phone] or [employer]      Problem: Nutrition Deficit:  Goal: Optimize nutritional status  Outcome: Not Progressing

## 2023-03-10 LAB
ABO + RH BLD: NORMAL
ALBUMIN SERPL-MCNC: 1.9 G/DL (ref 3.5–5)
ANION GAP SERPL CALC-SCNC: 7 MMOL/L (ref 5–15)
ATRIAL RATE: 79 BPM
BLD PROD TYP BPU: NORMAL
BLD PROD TYP BPU: NORMAL
BLOOD BANK DISPENSE STATUS: NORMAL
BLOOD BANK DISPENSE STATUS: NORMAL
BLOOD GROUP ANTIBODIES SERPL: NEGATIVE
BPU ID: NORMAL
BPU ID: NORMAL
BUN SERPL-MCNC: 28 MG/DL (ref 6–20)
BUN/CREAT SERPL: 5 (ref 12–20)
CA-I BLD-MCNC: 7.3 MG/DL (ref 8.5–10.1)
CALCULATED P AXIS, ECG09: 63 DEGREES
CALCULATED R AXIS, ECG10: 56 DEGREES
CALCULATED T AXIS, ECG11: 85 DEGREES
CHLORIDE SERPL-SCNC: 97 MMOL/L (ref 97–108)
CO2 SERPL-SCNC: 25 MMOL/L (ref 21–32)
CREAT SERPL-MCNC: 5.87 MG/DL (ref 0.55–1.02)
CROSSMATCH RESULT: NORMAL
CROSSMATCH RESULT: NORMAL
DIAGNOSIS, 93000: NORMAL
ERYTHROCYTE [DISTWIDTH] IN BLOOD BY AUTOMATED COUNT: 17.2 % (ref 11.5–14.5)
GLUCOSE BLD STRIP.AUTO-MCNC: 138 MG/DL (ref 65–100)
GLUCOSE BLD STRIP.AUTO-MCNC: 80 MG/DL (ref 65–100)
GLUCOSE BLD STRIP.AUTO-MCNC: 88 MG/DL (ref 65–100)
GLUCOSE SERPL-MCNC: 95 MG/DL (ref 65–100)
HBV SURFACE AG SER QL: <0.1 INDEX
HBV SURFACE AG SER QL: NEGATIVE
HCT VFR BLD AUTO: 25.4 % (ref 35–47)
HGB BLD-MCNC: 8.1 G/DL (ref 11.5–16)
MCH RBC QN AUTO: 30.5 PG (ref 26–34)
MCHC RBC AUTO-ENTMCNC: 31.9 G/DL (ref 30–36.5)
MCV RBC AUTO: 95.5 FL (ref 80–99)
NRBC # BLD: 0 K/UL (ref 0–0.01)
NRBC BLD-RTO: 0 PER 100 WBC
P-R INTERVAL, ECG05: 202 MS
PERFORMED BY, TECHID: ABNORMAL
PERFORMED BY, TECHID: NORMAL
PERFORMED BY, TECHID: NORMAL
PHOSPHATE SERPL-MCNC: 1.5 MG/DL (ref 2.6–4.7)
PLATELET # BLD AUTO: 230 K/UL (ref 150–400)
PMV BLD AUTO: 9.6 FL (ref 8.9–12.9)
POTASSIUM SERPL-SCNC: 4.1 MMOL/L (ref 3.5–5.1)
Q-T INTERVAL, ECG07: 476 MS
QRS DURATION, ECG06: 90 MS
QTC CALCULATION (BEZET), ECG08: 545 MS
RBC # BLD AUTO: 2.66 M/UL (ref 3.8–5.2)
SODIUM SERPL-SCNC: 129 MMOL/L (ref 136–145)
SPECIMEN EXP DATE BLD: NORMAL
TRANSFUSION STATUS PATIENT QL: NORMAL
TRANSFUSION STATUS PATIENT QL: NORMAL
UNIT DIVISION: 0
UNIT DIVISION: 0
VANCOMYCIN SERPL-MCNC: 14.8 UG/ML
VENTRICULAR RATE, ECG03: 79 BPM
WBC # BLD AUTO: 7.8 K/UL (ref 3.6–11)

## 2023-03-10 PROCEDURE — 85027 COMPLETE CBC AUTOMATED: CPT

## 2023-03-10 PROCEDURE — 74011000258 HC RX REV CODE- 258: Performed by: INTERNAL MEDICINE

## 2023-03-10 PROCEDURE — 87340 HEPATITIS B SURFACE AG IA: CPT

## 2023-03-10 PROCEDURE — 83540 ASSAY OF IRON: CPT

## 2023-03-10 PROCEDURE — 74011250636 HC RX REV CODE- 250/636: Performed by: INTERNAL MEDICINE

## 2023-03-10 PROCEDURE — 82962 GLUCOSE BLOOD TEST: CPT

## 2023-03-10 PROCEDURE — 80069 RENAL FUNCTION PANEL: CPT

## 2023-03-10 PROCEDURE — 90935 HEMODIALYSIS ONE EVALUATION: CPT

## 2023-03-10 PROCEDURE — 74011250637 HC RX REV CODE- 250/637: Performed by: INTERNAL MEDICINE

## 2023-03-10 PROCEDURE — 99152 MOD SED SAME PHYS/QHP 5/>YRS: CPT | Performed by: STUDENT IN AN ORGANIZED HEALTH CARE EDUCATION/TRAINING PROGRAM

## 2023-03-10 PROCEDURE — C1894 INTRO/SHEATH, NON-LASER: HCPCS | Performed by: STUDENT IN AN ORGANIZED HEALTH CARE EDUCATION/TRAINING PROGRAM

## 2023-03-10 PROCEDURE — 80202 ASSAY OF VANCOMYCIN: CPT

## 2023-03-10 PROCEDURE — 2709999900 HC NON-CHARGEABLE SUPPLY: Performed by: STUDENT IN AN ORGANIZED HEALTH CARE EDUCATION/TRAINING PROGRAM

## 2023-03-10 PROCEDURE — 74011000250 HC RX REV CODE- 250: Performed by: STUDENT IN AN ORGANIZED HEALTH CARE EDUCATION/TRAINING PROGRAM

## 2023-03-10 PROCEDURE — 74011000250 HC RX REV CODE- 250: Performed by: INTERNAL MEDICINE

## 2023-03-10 PROCEDURE — 74011000636 HC RX REV CODE- 636: Performed by: STUDENT IN AN ORGANIZED HEALTH CARE EDUCATION/TRAINING PROGRAM

## 2023-03-10 PROCEDURE — 94640 AIRWAY INHALATION TREATMENT: CPT

## 2023-03-10 PROCEDURE — 36415 COLL VENOUS BLD VENIPUNCTURE: CPT

## 2023-03-10 PROCEDURE — 77030019698 HC SYR ANGI MDLON MRTM -A: Performed by: STUDENT IN AN ORGANIZED HEALTH CARE EDUCATION/TRAINING PROGRAM

## 2023-03-10 PROCEDURE — 74011250636 HC RX REV CODE- 250/636: Performed by: STUDENT IN AN ORGANIZED HEALTH CARE EDUCATION/TRAINING PROGRAM

## 2023-03-10 PROCEDURE — 65270000029 HC RM PRIVATE

## 2023-03-10 PROCEDURE — C1769 GUIDE WIRE: HCPCS | Performed by: STUDENT IN AN ORGANIZED HEALTH CARE EDUCATION/TRAINING PROGRAM

## 2023-03-10 PROCEDURE — B2111ZZ FLUOROSCOPY OF MULTIPLE CORONARY ARTERIES USING LOW OSMOLAR CONTRAST: ICD-10-PCS | Performed by: STUDENT IN AN ORGANIZED HEALTH CARE EDUCATION/TRAINING PROGRAM

## 2023-03-10 PROCEDURE — 76210000006 HC OR PH I REC 0.5 TO 1 HR: Performed by: STUDENT IN AN ORGANIZED HEALTH CARE EDUCATION/TRAINING PROGRAM

## 2023-03-10 PROCEDURE — 93458 L HRT ARTERY/VENTRICLE ANGIO: CPT | Performed by: STUDENT IN AN ORGANIZED HEALTH CARE EDUCATION/TRAINING PROGRAM

## 2023-03-10 PROCEDURE — 77030040934 HC CATH DIAG DXTERITY MEDT -A: Performed by: STUDENT IN AN ORGANIZED HEALTH CARE EDUCATION/TRAINING PROGRAM

## 2023-03-10 PROCEDURE — 4A023N7 MEASUREMENT OF CARDIAC SAMPLING AND PRESSURE, LEFT HEART, PERCUTANEOUS APPROACH: ICD-10-PCS | Performed by: STUDENT IN AN ORGANIZED HEALTH CARE EDUCATION/TRAINING PROGRAM

## 2023-03-10 PROCEDURE — 77030019699 HC SYR ANGI MDLON MRTM -B: Performed by: STUDENT IN AN ORGANIZED HEALTH CARE EDUCATION/TRAINING PROGRAM

## 2023-03-10 RX ORDER — HEPARIN SODIUM 200 [USP'U]/100ML
INJECTION, SOLUTION INTRAVENOUS
Status: COMPLETED | OUTPATIENT
Start: 2023-03-10 | End: 2023-03-10

## 2023-03-10 RX ORDER — NITROGLYCERIN 5 MG/ML
INJECTION, SOLUTION INTRAVENOUS AS NEEDED
Status: DISCONTINUED | OUTPATIENT
Start: 2023-03-10 | End: 2023-03-10 | Stop reason: HOSPADM

## 2023-03-10 RX ORDER — DIPHENHYDRAMINE HCL 25 MG
25 CAPSULE ORAL
Status: DISCONTINUED | OUTPATIENT
Start: 2023-03-10 | End: 2023-03-17 | Stop reason: HOSPADM

## 2023-03-10 RX ORDER — LIDOCAINE HYDROCHLORIDE 10 MG/ML
INJECTION INFILTRATION; PERINEURAL AS NEEDED
Status: DISCONTINUED | OUTPATIENT
Start: 2023-03-10 | End: 2023-03-10 | Stop reason: HOSPADM

## 2023-03-10 RX ORDER — VERAPAMIL HYDROCHLORIDE 2.5 MG/ML
INJECTION, SOLUTION INTRAVENOUS AS NEEDED
Status: DISCONTINUED | OUTPATIENT
Start: 2023-03-10 | End: 2023-03-10 | Stop reason: HOSPADM

## 2023-03-10 RX ORDER — FENTANYL CITRATE 50 UG/ML
INJECTION, SOLUTION INTRAMUSCULAR; INTRAVENOUS AS NEEDED
Status: DISCONTINUED | OUTPATIENT
Start: 2023-03-10 | End: 2023-03-10 | Stop reason: HOSPADM

## 2023-03-10 RX ORDER — MIDAZOLAM HYDROCHLORIDE 1 MG/ML
INJECTION INTRAMUSCULAR; INTRAVENOUS AS NEEDED
Status: DISCONTINUED | OUTPATIENT
Start: 2023-03-10 | End: 2023-03-10 | Stop reason: HOSPADM

## 2023-03-10 RX ORDER — SODIUM CHLORIDE 0.9 % (FLUSH) 0.9 %
5-40 SYRINGE (ML) INJECTION AS NEEDED
Status: CANCELLED | OUTPATIENT
Start: 2023-03-10

## 2023-03-10 RX ORDER — SODIUM CHLORIDE 0.9 % (FLUSH) 0.9 %
5-40 SYRINGE (ML) INJECTION EVERY 8 HOURS
Status: CANCELLED | OUTPATIENT
Start: 2023-03-10

## 2023-03-10 RX ORDER — HEPARIN SODIUM 1000 [USP'U]/ML
INJECTION, SOLUTION INTRAVENOUS; SUBCUTANEOUS AS NEEDED
Status: DISCONTINUED | OUTPATIENT
Start: 2023-03-10 | End: 2023-03-10 | Stop reason: HOSPADM

## 2023-03-10 RX ADMIN — BUDESONIDE AND FORMOTEROL FUMARATE DIHYDRATE 2 PUFF: 160; 4.5 AEROSOL RESPIRATORY (INHALATION) at 19:37

## 2023-03-10 RX ADMIN — LOSARTAN POTASSIUM 50 MG: 50 TABLET, FILM COATED ORAL at 12:59

## 2023-03-10 RX ADMIN — FAMOTIDINE 20 MG: 20 TABLET ORAL at 08:23

## 2023-03-10 RX ADMIN — SEVELAMER CARBONATE 800 MG: 800 TABLET, FILM COATED ORAL at 08:22

## 2023-03-10 RX ADMIN — PANTOPRAZOLE SODIUM 40 MG: 40 TABLET, DELAYED RELEASE ORAL at 08:22

## 2023-03-10 RX ADMIN — PIPERACILLIN AND TAZOBACTAM 4.5 G: 4; .5 INJECTION, POWDER, FOR SOLUTION INTRAVENOUS at 08:23

## 2023-03-10 RX ADMIN — ACETAMINOPHEN 650 MG: 325 TABLET ORAL at 23:43

## 2023-03-10 RX ADMIN — CARVEDILOL 25 MG: 12.5 TABLET, FILM COATED ORAL at 17:26

## 2023-03-10 RX ADMIN — MEGESTROL ACETATE 200 MG: 40 SUSPENSION ORAL at 08:22

## 2023-03-10 RX ADMIN — NEPHROCAP 1 CAPSULE: 1 CAP ORAL at 17:26

## 2023-03-10 RX ADMIN — SODIUM CHLORIDE, PRESERVATIVE FREE 10 ML: 5 INJECTION INTRAVENOUS at 05:46

## 2023-03-10 RX ADMIN — CINACALCET HYDROCHLORIDE 30 MG: 30 TABLET, FILM COATED ORAL at 08:22

## 2023-03-10 RX ADMIN — CARVEDILOL 25 MG: 12.5 TABLET, FILM COATED ORAL at 12:59

## 2023-03-10 RX ADMIN — SODIUM CHLORIDE, PRESERVATIVE FREE 10 ML: 5 INJECTION INTRAVENOUS at 23:12

## 2023-03-10 RX ADMIN — PIPERACILLIN AND TAZOBACTAM 3.38 G: 3; .375 INJECTION, POWDER, FOR SOLUTION INTRAVENOUS at 19:53

## 2023-03-10 RX ADMIN — MONTELUKAST 10 MG: 10 TABLET, FILM COATED ORAL at 08:22

## 2023-03-10 RX ADMIN — LEVOTHYROXINE SODIUM 100 MCG: 0.1 TABLET ORAL at 08:22

## 2023-03-10 RX ADMIN — SODIUM CHLORIDE, PRESERVATIVE FREE 10 ML: 5 INJECTION INTRAVENOUS at 15:34

## 2023-03-10 RX ADMIN — FLUTICASONE PROPIONATE 2 SPRAY: 50 SPRAY, METERED NASAL at 12:59

## 2023-03-10 RX ADMIN — VANCOMYCIN HYDROCHLORIDE 500 MG: 500 INJECTION, POWDER, LYOPHILIZED, FOR SOLUTION INTRAVENOUS at 18:16

## 2023-03-10 RX ADMIN — EPOETIN ALFA-EPBX 8000 UNITS: 4000 INJECTION, SOLUTION INTRAVENOUS; SUBCUTANEOUS at 08:58

## 2023-03-10 NOTE — ROUTINE PROCESS
Dr. Nela Escamilla notified of the new admission transferred from [facility]. Dr. Nela Escamilla will see the patient shortly. Currently awaiting orders.

## 2023-03-10 NOTE — PROGRESS NOTES
Problem: Risk for Spread of Infection  Goal: Prevent transmission of infectious organism to others  Description: Prevent the transmission of infectious organisms to other patients, staff members, and visitors. Outcome: Progressing Towards Goal     Problem: Patient Education:  Go to Education Activity  Goal: Patient/Family Education  Outcome: Progressing Towards Goal     Problem: Falls - Risk of  Goal: *Absence of Falls  Description: Document Fallon Sorensen Fall Risk and appropriate interventions in the flowsheet. Outcome: Progressing Towards Goal  Note: Fall Risk Interventions:                                Problem: Patient Education: Go to Patient Education Activity  Goal: Patient/Family Education  Outcome: Progressing Towards Goal     Problem: Pressure Injury - Risk of  Goal: *Prevention of pressure injury  Description: Document Quinn Scale and appropriate interventions in the flowsheet. Outcome: Progressing Towards Goal  Note: Pressure Injury Interventions:             Activity Interventions: Pressure redistribution bed/mattress(bed type)    Mobility Interventions: Pressure redistribution bed/mattress (bed type)    Nutrition Interventions: Document food/fluid/supplement intake, Discuss nutritional consult with provider                     Problem: Patient Education: Go to Patient Education Activity  Goal: Patient/Family Education  Outcome: Progressing Towards Goal

## 2023-03-10 NOTE — PROGRESS NOTES
Dual RN skin assessment completed with [address] RN . Small abrasion noted to left buttock.   Dressing to left chest, possibly from central line removal.

## 2023-03-10 NOTE — DIALYSIS
Tx completed without issue. Dr Marcela Clark seen at bedside. 2KGS removed and  3 hours tx time.  Epo given

## 2023-03-10 NOTE — PROGRESS NOTES
Vancomycin Dosing Consult  Mell Alcantara is a 74 y.o. female with Sepsis. Pharmacy was consulted by Dr. Mac Dietz to dose and monitor Vancomycin. Today is day 1. Antibiotic regimen: Vancomycin + Zosyn    Temp (24hrs), Av.2 °F (36.8 °C), Min:97.7 °F (36.5 °C), Max:99.1 °F (37.3 °C)    Recent Labs     23   WBC 7.0     Recent Labs     23   CREA 5.07*  5.11*   BUN 25*  25*     No results for input(s): CRP in the last 336 hours. Recent Labs     23   PCT 80.88*       Estimated Creatinine Clearance: 9.1 mL/min (A) (based on SCr of 5.07 mg/dL (H)). ml/min   HD patient on a Monday & Friday schedule  Concomitant nephrotoxic drugs: None    Cultures:   None    MRSA Swab: N/A    Target range: Trough 21-24 mcg/mL (Intermittent hemodialysis, invasive MRSA infection or sepsis)       Assessment/Plan:   HD patient on a MO & FR schedule. Per the overnight Hospitalist, pt was on Vancomycin therapy prior to transfer from [facility] to [facility]. Ordered a random level for this morning. Re-dose post dialysis if level is subtherapeutic.   Antimicrobial stop date TBD

## 2023-03-10 NOTE — H&P
History and Physical    Patient: Javier Arredondo MRN: [id number]  SSN: [id number]    YOB: 1955  Age: 74 y.o. Sex: female      Subjective:      Javier Arredondo is a 74 y.o. female with PMH of ESRD, failed renal transplant, arrhythmia (?atrial fibrillation), hypertension. She was admitted to [facility] with chief complaint of nausea, vomiting and  abdominal cramps, presumably due to missed hemodialysis. Otherwise limited history obtained from there as patient is withdrawn. While undergoing hemodialysis, patient had cardiac arrest V-tach and Qtc prolongation and intubated. She was subsequently extubated on 3/8. Patient is then transferred to [facility] per cardiology as she will need cardiac cath. On my evaluation, patient reports no active complaints. Specifically no chest pain, shortness of breath abdominal pain nausea and vomiting.      Chart review: none    Past Medical History:   Diagnosis Date    [facility] 05/09/2019    Anemia NEC     Anxiety     Arrhythmia     Arthritis     Asthma     Asthma     Burning with urination     frequent uti    Calculus of gallbladder with acute cholecystitis without obstruction 10/09/2020    Cholecystitis 01/12/2019    Chronic kidney disease     dialysis    CMV (cytomegalovirus) antibody positive     Colitis 09/10/2022    COPD (chronic obstructive pulmonary disease) ([facility]     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    Dialysis patient [facility])     M/W/F    Diverticular disease of colon 08/21/2013    Dyspepsia and other specified disorders of function of stomach     stomach ulcer    Elevated troponin 10/21/2019    Encounter for cholecystectomy 05/06/2021 7/2020    Essential hypertension     GERD (gastroesophageal reflux disease)     History of chemotherapy     History of renal transplant 08/21/2013    (LD 2/12/2002)    HLD (hyperlipidemia)     Hypercholesteremia     Hypertension     Hypothyroidism 03/23/2022    Kidney transplant rejection     Menopause Migraine     Obesity     Osteoporosis     Pancreatitis 08/28/2022    Pyelonephritis 07/13/2020    Recurrent urinary tract infection 09/27/2016    Renal cyst 2002    kidney transplant     Spontaneous bacterial peritonitis ([facility] 01/16/2019    Ulcer      Family History   Problem Relation Age of Onset    Diabetes Mother     Cervical Cancer Mother     Arthritis-rheumatoid Mother     Hypertension Father     Diabetes Father     Thyroid Disease Sister     Colon Polyps Brother      Social History     Tobacco Use    Smoking status: Never    Smokeless tobacco: Never   Substance Use Topics    Alcohol use: No        Objective:     Physical Exam:   General: alert, not cooperative, no distress  Eye: conjunctivae/corneas clear. PERRL, EOM's intact. Throat and Neck: normal and no erythema or exudates noted. No mass   Lung: clear to auscultation bilaterally  Heart: regular rate and rhythm,   Abdomen: soft, non-tender. Bowel sounds normal. No masses,  Extremities: No LE edema. able to move all extremities normal, atraumatic  Skin: Normal.  Neurologic: Motor function and sensation grossly intact. Psychiatric: withdrawn    Most recent lab work and imaging results reviewed in [address] [place] [address]. Assessment and plan:   # Recent cardiac arrest secondary to V tach/ V fib.   - Consult cardiology for possible LHC. - repeat EKG and check troponin     # Atrial fibrillation   - Was on amiodarone ggt and PO in [place], which later converted. Currently NSR  - GARO-Vasc 3. Hold eliquis given possible cardiac intervention and anemia. Hold off heparin ggt due to acute anemia. # Qtc prolongation  - Repeat EKG showed Qtc 545ms. No ST elevation.   - Avoid medications with  Qtc prolongation. # Fever - resolved   - was 102 on 3/8, but not meeting SIRS criteria. No clear source of infection. Blood culture drawn in [facility]. - Was on vancomycin and zosyn. - Check procalcitonin and repeat blood culture.  Addendum: significantly elevated procalcitonin, will resume vancomycin and zosyn empirically. # Anemia  - Secondary to GI bleed, s/p 1u pRBC transfusion. Also ESRD. Hemoglobin 8.6 post-transfusion.   - PO protonix and pepcid. - hold eliquis. Uncertain if patient also on plavix. - monitor hemoglobin   - Appreciate nephrology input. # ESRD on hemodialysis   - Monday and Friday hemodialysis. - Consult nephrology   - Continue home medications. # Essential hypertension   - Continue home medications. # Hypothyroidism  - Continue home medications. Levothyroxine dose increased to 100mcg in [place]. # Asthma  - not in exacerbation  - Continue home medications. # Malnutrition  - Secondary to poor oral intake  - megestrol  - nutrition consult and supp     # Depression  - Psychiatry consult for assistance with anti-depressant. Also concern for Qtc prolongation. # Social Determents of health: Poor social support and Low health literacy   - PT/OT, case management for safe discharge. # Full code by default, need further clarification    # Medication reconciliation: Medication list reviewed on Epic and/or outside documentation. Not reviewed with patient. However, medication reconciliation incomplete, appreciate assistance from pharmacy or nursing staff.     Signed By: Thai Lentz MD     March 9, 2023

## 2023-03-10 NOTE — PROGRESS NOTES
Vancomycin Dosing:  Patient received HD today, now in cardiac cath lab. Administer vancomycin 500mg IV x 1 at 18:00, post-cath. Vancomycin dose to be preceded by diphenhydramine 25mg PO as described in Allergies section for patient. Random level scheduled for 3/13 @ 04:00.

## 2023-03-10 NOTE — CONSULTS
CARDIOLOGY CONSULTATION      REASON FOR CONSULT: VT, cardiac arrest     REQUESTING PROVIDER: Sima    CHIEF COMPLAINT:  N/V    HISTORY OF PRESENT ILLNESS:  Maya Helm is a 74y.o. year-old female with past medical history significant for ESRD on HD, pAdib, HTN presenting from outside hospital after a VT arrest.  According to records she had a VT arrest. Had long QT as well. I do not see any recent echocardiogram. She was transferred here for a cardiac catheterization to rule out CAD. No chest pain here. She is getting HD today before her catheterization. INPATIENT MEDICATIONS:  Home medications reviewed.     Current Facility-Administered Medications:     piperacillin-tazobactam (ZOSYN) 3.375 g in 0.9% sodium chloride (MBP/ADV) 100 mL MBP, 3.375 g, IntraVENous, Q12H, Hu Gao MD    diphenhydrAMINE (BENADRYL) capsule 25 mg, 25 mg, Oral, Q6H PRN, Mark Gao MD    VANCOMYCIN INFORMATION NOTE 1 Each, 1 Each, Other, Rx Dosing/Monitoring, Hu Gao MD    epoetin jalen-epbx (RETACRIT) injection 8,000 Units, 8,000 Units, IntraVENous, Q MON, WED & [place], Anitha Lynch MD, 8,000 Units at 03/10/23 0858    B complex-vitaminC-folic acid (NEPHROCAP) cap, 1 Capsule, Oral, DAILY, Omar Henry MD    albuterol (PROVENTIL HFA, VENTOLIN HFA, PROAIR HFA) inhaler 1 Puff, 1 Puff, Inhalation, Q4H PRN, Mark Gao MD    alum-mag hydroxide-simeth (MYLANTA) oral suspension 10 mL, 10 mL, Oral, Q6H PRN, Mark Gao MD    [START ON 3/11/2023] calcitRIOL (ROCALTROL) capsule 0.5 mcg, 0.5 mcg, Oral, Once per day on Sun Tue Wed Thu Sat, Alcira Avery MD    carvediloL (COREG) tablet 25 mg, 25 mg, Oral, BID WITH MEALS, Mark Gao MD, 25 mg at 03/10/23 1259    cinacalcet (SENSIPAR) tablet 30 mg, 30 mg, Oral, DAILY, Mark Gao MD, 30 mg at 03/10/23 7888    famotidine (PEPCID) tablet 20 mg, 20 mg, Oral, DAILY, Mark Gao MD, 20 mg at 03/10/23 0823    fluticasone propionate (FLONASE) 50 mcg/actuation nasal spray 2 Spray, 2 Spray, Both Nostrils, DAILY, Madelin Gao MD, 2 [place] at 03/10/23 1259    losartan (COZAAR) tablet 50 mg, 50 mg, Oral, DAILY, Madelin Gao MD, 50 mg at 03/10/23 1259    montelukast (SINGULAIR) tablet 10 mg, 10 mg, Oral, DAILY, Madelin aGo MD, 10 mg at 03/10/23 3791    [Held by provider] sevelamer carbonate (RENVELA) tab 800 mg, 800 mg, Oral, TID, Madelin Gao MD, 800 mg at 03/10/23 2371    budesonide-formoteroL (SYMBICORT) 160-4.5 mcg/actuation HFA inhaler 2 Puff, 2 Puff, Inhalation, BID RT, 2 Puff at 03/09/23 2123 **AND** tiotropium bromide (SPIRIVA RESPIMAT) 2.5 mcg /actuation, 2 Puff, Inhalation, DAILY, Hu Gao MD    sodium chloride (NS) flush 5-40 mL, 5-40 mL, IntraVENous, Madelin Bergman MD, 10 mL at 03/10/23 0546    sodium chloride (NS) flush 5-40 mL, 5-40 mL, IntraVENous, PRN, Addy Kenny MD    acetaminophen (TYLENOL) tablet 650 mg, 650 mg, Oral, Q6H PRN **OR** acetaminophen (TYLENOL) suppository 650 mg, 650 mg, Rectal, Q6H PRN, Addy Kenny MD    polyethylene glycol (MIRALAX) packet 17 g, 17 g, Oral, DAILY PRN, Addy Kenny MD    dextrose 10% infusion 250 mL, 250 mL, IntraVENous, PRN, Addy Kenny MD    levothyroxine (SYNTHROID) tablet 100 mcg, 100 mcg, Oral, ACB, Madelin Gao MD, 100 mcg at 03/10/23 2972    promethazine (PHENERGAN) 12.5 mg in 0.9% sodium chloride 50 mL IVPB, 12.5 mg, IntraVENous, Q4H PRN, Addy Kenny MD    megestroL (MEGACE) 400 mg/10 mL (10 mL) oral suspension 200 mg, 200 mg, Oral, DAILY, Madelin Gao MD, 200 mg at 03/10/23 4443    pantoprazole (PROTONIX) tablet 40 mg, 40 mg, Oral, ACB, Madelin Gao MD, 40 mg at 03/10/23 0830     ALLERGIES:  Allergies reviewed with the patient,  Allergies   Allergen Reactions    Aspirin Rash and Unknown (comments)    Bactrim [Sulfamethoxazole-Trimethoprim] Rash and Unknown (comments)    Bromfenac Rash and Unknown (comments)    Cefepime Other (comments)     Neurological reaction    Ceftriaxone Rash Copper Rash    Ibuprofen Rash and Unknown (comments)    Ketorolac Tromethamine Rash and Unknown (comments)    Relafen [Nabumetone] Rash and Unknown (comments)    Rifampin Rash and Unknown (comments)    Vancomycin Rash and Unknown (comments)     Pt reports causes itching, takes benadryl prior to use. Pt denies anaphylaxis. Requires benadryl with each vancomycin dose    . FAMILY HISTORY:  Family history reviewed. SOCIAL HISTORY:  Notable for no  tobacco use, no heavy alcohol or illicit drug use. REVIEW OF SYSTEMS:  Complete review of systems performed, pertinents noted above, all other systems are negative. PHYSICAL EXAMINATION: Cardiovascular exam has a heart with a RRR, normal S1 and S2.  no murmur present. There are no rubs or gallops. Good peripheral pulses. No jugular venous distension. no carotid bruits are present. Respiratory exam reveals clear lung fields, no rales or rhonchi. Gastrointestinal exam has soft, nontender abdomen with normal bowel sounds. Lymphatic exam reveals no edema and no varicosities. No notable skin changes. Neurologic exam is nonfocal.    Visit Vitals  BP (!) 148/62 (BP 1 Location: Right leg, BP Patient Position: Semi fowlers)   Pulse 85   Temp 98.9 °F (37.2 °C)   Resp 18   Ht 5' 0.98\" (1.549 m)   Wt 58.9 kg (129 lb 13.6 oz)   SpO2 99%   BMI 24.55 kg/m²         Recent labs results and imaging reviewed.   Notable findings include   Lab Results   Component Value Date/Time    WBC 7.8 03/10/2023 08:30 AM    HGB 8.1 (L) 03/10/2023 08:30 AM    HCT 25.4 (L) 03/10/2023 08:30 AM    PLATELET 840 66/69/0673 08:30 AM    MCV 95.5 03/10/2023 08:30 AM     Lab Results   Component Value Date/Time    Hemoglobin A1c 4.9 01/20/2022 03:46 PM    Glucose 95 03/10/2023 08:30 AM    Glucose (POC) 88 03/10/2023 12:54 PM    Microalbumin/Creat ratio (mg/g creat) 1,261 (H) 01/20/2022 03:46 PM    Microalbumin,urine random 87.00 (H) 01/20/2022 03:46 PM    LDL, calculated 38.2 01/20/2022 03:46 PM    Creatinine 5.87 (H) 03/10/2023 08:30 AM      Lab Results   Component Value Date/Time    TSH 10.50 (H) 11/22/2022 04:18 AM      Lab Results   Component Value Date/Time     (H) 12/24/2021 05:45 AM    CK - MB 3.8 12/24/2021 05:45 AM    Troponin-I, Qt. 0.03 12/24/2021 08:30 AM    BNP 1,530 (H) 02/02/2022 05:39 AM     .       1) VT cardiac arrest: Was transferred from outside hospital for cardiac catheterization. Troponins were low level. No chest pain   - Plan for cardiac catheterization today   - C/w Coreg 25mg BID, losartan 50mg every day,   2) Acute blood loss anemia: Hgb is 8.1. She was reportedly transfused at outside hospital. Defer work up and treatment to primary. 3) ESRD on HD: Nephrology following  4) pAfib: Now in NSR. Will have follow up with EP as outpatient for a ILR for Afib burden. Defer AC for now given anemia      Thank you for involving us in the care of this patient.   Darwin Vásquez MD  3/10/2023

## 2023-03-10 NOTE — CONSULTS
NEPHROLOGY CONSULT NOTE     Patient: Seng Michael MRN: [id number]  PCP: Virginia Morales MD   :     1955  Age:   74 y.o. Sex:  female      Referring physician: Yris Orellana MD      Reason for consultation:     End-stage renal disease on hemodialysis. Ms. Charan Serna was seen and examined in the dialysis suite this morning. Dialysis was in progress during my visit. Mainor [RN-Dialysis] was present during my evaluation. Admission Date: 3/9/2023  6:44 PM  LOS: 1 day     DISCUSSION / PLAN :   Complete dialysis today. Ms. Charan Serna will be maintained on a  hemodialysis schedule while she is an inpatient. Medications should be dosed for her GFR. The target phosphorus level is 3 to 5. Her phosphorus level is 1.7 this morning. The phosphate binder (sevelamer) will be placed on hold. Check the renal panel/phos in the am (3/11/23). She has anemia of chronic kidney disease. The target hemoglobin is 10 to 11 g/dL. Epogen will be provided during her HD sessions. Will request her iron stores as well. Check the PTH level in light of the likelihood of secondary hyperparathyroidism. The 25-hydroxy vitamin D level will be assessed as well. Vitamin D deficiency is not unusual in patients with end-stage renal disease. Hyponatremia is likely secondary to impaired free water clearance which is not unusual in patients with end-stage renal disease. A fluid restriction of 1.5 L/day seems reasonable. Follow CBC and electrolytes . Active Problems / Assessment AAActive  : Active Problems:    Ventricular tachyarrhythmia (3/9/2023)    End-stage renal disease on hemodialysis. Hypertension. Anemia chronic kidney disease. S/p V. tach arrest.  Failed renal transplant. Hyponatremia. Hypophosphatemia. Subjective:         HPI:     Ms. Charan Serna is a 60-year-old lady whose comorbidities include end-stage renal disease, hypertension and anemia of chronic kidney disease. She is on hemodialysis and in the [place] area. She is followed by Dr. Deleta Cockayne at the [address] [place] [address] Ave clinic there. There is also history of renal transplantation. It seems her renal allograft lasted for 20 years. She returned to maintenance hemodialysis 2 years ago. She has a mature AV fistula and has been receiving dialysis via this access. She was transferred from the hospital in [place] overnight. It seems over the days (prior to admission), she had episodes of weakness, abdominal pain, nausea, vomiting and diarrhea. She did not report any history of syncope. There was a history of chest pain. There was no history of fever, chills or sore throat. She was initially seen in the emergency room and discharged home. However, her symptoms persisted. She was taken by EMS to the hospital the next day. She was placed in intensive care unit. During her stay there she had a V. tach arrest [with QT prolongation]. She was evaluated by the Cardiology team .  It was determined she required  cardiac catheterization as part of her ongoing risk stratification. With this in mind, she was transferred to [facility] for continuation of her care. Her potassium was 4.1 this morning. Her phosphorus was 1.7.       Past Medical Hx:   Past Medical History:   Diagnosis Date    JEFF (acute kidney injury) ([facility] [address] 05/09/2019    Anemia NEC     Anxiety     Arrhythmia     Arthritis     Asthma     Asthma     Burning with urination     frequent uti    Calculus of gallbladder with acute cholecystitis without obstruction 10/09/2020    Cholecystitis 01/12/2019    Chronic kidney disease     dialysis    CMV (cytomegalovirus) antibody positive     Colitis 09/10/2022    COPD (chronic obstructive pulmonary disease) ([facility] [address]     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    Dialysis patient [facility])     M/W/F    Diverticular disease of colon 08/21/2013    Dyspepsia and other specified disorders of function of stomach stomach ulcer    Elevated troponin 10/21/2019    Encounter for cholecystectomy 05/06/2021 7/2020    Essential hypertension     GERD (gastroesophageal reflux disease)     History of chemotherapy     History of renal transplant 08/21/2013    (LD 2/12/2002)    HLD (hyperlipidemia)     Hypercholesteremia     Hypertension     Hypothyroidism 03/23/2022    Kidney transplant rejection     Menopause     Migraine     Obesity     Osteoporosis     Pancreatitis 08/28/2022    Pyelonephritis 07/13/2020    Recurrent urinary tract infection 09/27/2016    Renal cyst 2002    kidney transplant     Spontaneous bacterial peritonitis ([address] 01/16/2019    Ulcer         Past Surgical Hx:     Past Surgical History:   Procedure Laterality Date    COLONOSCOPY      Dr Hollie Mae  5yrs ago    ENDOSCOPY VISIT-OUTPATIENT      Dr Hollie Mae  5 yrs ago    ENDOSCOPY, COLON, DIAGNOSTIC      with Dr. Katrina Becerril CHOLECYSTECTOMY  02/2021    HX COLONOSCOPY  05/13/2022    HX GI      ulcer    HX HEENT      sinus surg    HX OTHER SURGICAL  02/21/2022    SIGMOIDOSCOPY, FLEXIBLE;  Surgeon: Ermelinda Donato MD    HX RENAL TRANSPLANT      HX TRANSPLANT      kidney transplant 2002    AK UNLISTED PROCEDURE VASCULAR SURGERY      shunt in prep for dialysis       Medications:  Prior to Admission medications    Medication Sig Start Date End Date Taking?  Authorizing Provider   traZODone (DESYREL) 50 mg tablet TAKE 1 TABLET BY MOUTH EVERY NIGHT FOR INSOMNIA ASSOCIATED WITH DEPRESSION 2/8/23   Raji DANG MD   clopidogreL (PLAVIX) 75 mg tab TAKE 1 TABLET EVERY MORNING 1/13/23   Seth Chin MD   Eliquis 2.5 mg tablet TAKE 1 TABLET TWICE DAILY 1/13/23   Lucio Mcgee MD   losartan (COZAAR) 50 mg tablet TAKE 1 TABLET EVERY MORNING 1/13/23   Lucio Mcgee MD   levothyroxine (SYNTHROID) 75 mcg tablet TAKE 1 TABLET EVERY DAY BEFORE BREAKFAST 1/13/23   Seth Posadas MD   montelukast (SINGULAIR) 10 mg tablet TAKE 1 TABLET EVERY MORNING 1/13/23   Abad Yang MD   sucralfate (CARAFATE) 1 gram tablet TAKE 1 TABLET FOUR TIMES DAILY 1/13/23   Abad Yang MD   valGANciclovir (VALCYTE) 450 mg tablet TAKE 2 TABLETS EVERY MORNING 1/13/23   Vasquez DANG MD   amiodarone (CORDARONE) 200 mg tablet TAKE 1 TABLET EVERY MORNING 1/13/23   Seth Posadas MD   simvastatin (ZOCOR) 20 mg tablet TAKE 1 TABLET EVERY DAY AS DIRECTED 1/13/23   Vasquez DANG MD   carvediloL (COREG) 25 mg tablet TAKE 1 TABLET TWICE DAILY WITH MEALS 1/13/23   Vasquez DANG MD   dicyclomine (BENTYL) 10 mg capsule TAKE 1 CAPSULE EVERY MORNING 1/13/23   Vasquez DANG MD   amLODIPine (NORVASC) 10 mg tablet TAKE 1 TABLET EVERY DAY 1/6/23   Vasquez DANG MD   meclizine (ANTIVERT) 25 mg tablet TAKE 1 TABLET THREE TIMES DAILY AS NEEDED FOR DIZZINESS 12/21/22   Vasquez DANG MD   ondansetron (ZOFRAN ODT) 4 mg disintegrating tablet Take 2 Tablets by mouth every eight (8) hours as needed for Nausea or Nausea or Vomiting. 11/28/22   Abad Yang MD   Trelegy Ellipta 100-62.5-25 mcg inhaler INHALE 1 PUFF EVERY DAY. PLEASE CALL AND SCHEDULE AN APPOINTMENT WITH NEW PCP FOR FUTURE REFILLS 11/19/22   Abad Yang MD   famotidine (PEPCID) 20 mg tablet Take 20 mg by mouth daily. 10/13/22   Provider, Historical   albuterol (PROVENTIL HFA, VENTOLIN HFA, PROAIR HFA) 90 mcg/actuation inhaler Take 1 Puff by inhalation every four (4) hours as needed for Wheezing.  Indications: asthma attack 11/3/22   Abad Yang MD   fluticasone propionate [facility]) 50 mcg/actuation nasal spray USE 1 SPRAY IN [facility] NOSTRIL EVERY MORNING 10/5/22   Vasquez DANG MD   EPINEPHrine (EPIPEN) 0.3 mg/0.3 mL injection INJECT 0.3 ML INTRAMUSCULARLY ONCE AS NEEDED FOR ALLERGIC RESPONSE 10/5/22   Abad Yang MD   Dexilant 60 mg CpDB capsule (delayed release) TAKE 1 CAPSULE BY MOUTH IN THE MORNING. 10/3/22   La Garcia MD diclofenac (VOLTAREN) 1 % gel Apply  to affected area four (4) times daily. 9/23/22   Juan Castillo MD   polyethylene glycol (Miralax) 17 gram packet Take 1 Packet by mouth two (2) times a day. 9/14/22   Nila Barron MD   doxercalciferoL (HECTOROL) 4 mcg/2 mL injection 6 mcg by IntraVENous route. 3/25/22   Provider, Historical   epoetin jalen (EPOGEN;PROCRIT) 10,000 unit/mL injection 10,000 Units by SubCUTAneous route. 3/28/22   Provider, Historical   cinacalcet (SENSIPAR) 30 mg tablet Take 30 mg by mouth daily. Provider, Historical   hyoscyamine SL (LEVSIN/SL) 0.125 mg SL tablet 1 Tablet by SubLINGual route every four (4) hours as needed (irritable bowel). 7/27/22   Najma Garcia MD   predniSONE (DELTASONE) 5 mg tablet Take 1 Tablet by mouth daily. Provider, Historical   albuterol (PROVENTIL VENTOLIN) 2.5 mg /3 mL (0.083 %) nebu USE 1 VIAL VIA NEBULIZER THREE TIMES DAILY 12/23/21   Najma Garcia MD   calcitRIOL (ROCALTROL) 0.5 mcg capsule Take 0.5 mcg by mouth See Admin Instructions. Take on non dialysis days (Tues, Wed, Thur, Sat, Sun)  Dialysis is on Monday and Friday 7/19/21   Abelardo Yepez MD   RenaPlex-D 800 mcg-12.5 mg -2,000 unit tab Take 1 Tablet by mouth daily. 4/15/21   Abelardo Yepez MD   sevelamer carbonate (RENVELA) 800 mg tab tab Take 800 mg by mouth three (3) times daily. 6/8/21   Abelardo Yepez MD   aluminum & magnesium hydroxide-simethicone (Maalox Maximum Strength) 400-400-40 mg/5 mL suspension Take 10 mL by mouth every six (6) hours as needed for Indigestion. 9/17/20   Imelda Tee MD   ESTRACE 0.01 % (0.1 mg/gram) vaginal cream INSERT 2 GRAMS INTO VAGINA EVERY MONDAY AND THURSDAY 4/11/17   Da Prakash MD   cranberry extract 425 mg cap Take 425 mg by mouth daily.  1/17/14   Provider, Historical       Allergies   Allergen Reactions    Aspirin Rash and Unknown (comments)    Bactrim [Sulfamethoxazole-Trimethoprim] Rash and Unknown (comments)    Bromfenac Rash and Unknown (comments)    Cefepime Other (comments)     Neurological reaction    Ceftriaxone Rash    Copper Rash    Ibuprofen Rash and Unknown (comments)    Ketorolac Tromethamine Rash and Unknown (comments)    Relafen [Nabumetone] Rash and Unknown (comments)    Rifampin Rash and Unknown (comments)    Vancomycin Rash and Unknown (comments)     Pt reports causes itching, takes benadryl prior to use. Pt denies anaphylaxis. Requires benadryl with each vancomycin dose       Social Hx:  reports that she has never smoked. She has never used smokeless tobacco. She reports that she does not drink alcohol and does not use drugs. Ms. Charan Serna has 2 children. Family History   Problem Relation Age of Onset    Diabetes Mother     Cervical Cancer Mother     Arthritis-rheumatoid Mother     Hypertension Father     Diabetes Father     Thyroid Disease Sister     Colon Polyps Brother        Review of Systems:      See HPI. Objective:    Vitals:    Vitals:    03/10/23 1000 03/10/23 1030 03/10/23 1100 03/10/23 1130   BP: (!) 158/61 (!) 160/50 (!) 161/52 (!) 167/54   Pulse: 78 76     Resp:       Temp:       TempSrc:       SpO2:       Weight:       Height:         I&O's:  No intake/output data recorded. Visit Vitals  BP (!) 167/54   Pulse 76   Temp 99.6 °F (37.6 °C) (Oral)   Resp 18   Ht 5' 1\" (1.549 m)   Wt 58.9 kg (129 lb 13.6 oz)   SpO2 97%   BMI 24.54 kg/m²       Physical Exam:    Ms. Charan Serna was seen and examined in the dialysis suite this morning. Dialysis was in progress during my visit. Mainor[RN-Dialysis] was present during my visit. General: An older lady lying in bed quite comfortably. Head: Normocephalic. Mucous membranes: Anicteric. Neck: No JVD elevation. Cardiovascular: S1, S2, no S3 gallop, no pericardial rub. Respiratory: Breath sounds are vesicular, no wheezes, no rales, no rhonchi. Abdomen: Not distended. Left upper extremity: Mature AV fistula with needles in place.     Right upper extremity: PICC line noted. Lower extremities: No pretibial edema. Neuro: Alert and answering all questions appropriately. Psych: Appropriate affect. Laboratory Results:    Lab Results   Component Value Date    BUN 28 (H) 03/10/2023     (L) 03/10/2023    K 4.1 03/10/2023    CL 97 03/10/2023    CO2 25 03/10/2023       Lab Results   Component Value Date    BUN 28 (H) 03/10/2023    BUN 25 (H) 03/09/2023    BUN 25 (H) 03/09/2023    BUN 95 (H) 02/10/2023    BUN 66 (H) 11/23/2022    K 4.1 03/10/2023    K 3.8 03/09/2023    K 3.7 03/09/2023    K 5.9 (H) 02/10/2023    K 5.1 11/23/2022       Lab Results   Component Value Date    WBC 7.8 03/10/2023    RBC 2.66 (L) 03/10/2023    HGB 8.1 (L) 03/10/2023    HCT 25.4 (L) 03/10/2023    MCV 95.5 03/10/2023    MCH 30.5 03/10/2023    RDW 17.2 (H) 03/10/2023     03/10/2023       Lab Results   Component Value Date    PHOS 1.5 (L) 03/10/2023       Urine dipstick:   Lab Results   Component Value Date/Time    Color Yellow 11/21/2022 05:21 PM    Appearance Clear 11/21/2022 05:21 PM    Specific gravity 1.011 11/21/2022 05:21 PM    pH (UA) 8.0 11/21/2022 05:21 PM    Protein 300 (A) 11/21/2022 05:21 PM    Glucose Negative 11/21/2022 05:21 PM    Ketone Negative 11/21/2022 05:21 PM    Bilirubin Negative 11/21/2022 05:21 PM    Urobilinogen 0.2 11/21/2022 05:21 PM    Nitrites Negative 11/21/2022 05:21 PM    Leukocyte Esterase Negative 11/21/2022 05:21 PM    Epithelial cells Few 11/21/2022 05:21 PM    Bacteria 1+ (A) 11/21/2022 05:21 PM    WBC 0-4 11/21/2022 05:21 PM    RBC 0-5 11/21/2022 05:21 PM       I have reviewed the following:     Sandy Bowie discussed with: Mainor (RN-Dialysis) , Ms Laverne Nestor reviewed. Thank you for allowing us to participate in the care of this patient. We will follow patient.  Please dont hesitate to call with any questions        Kayden Chou MD  3/10/2023    [facility]  [address] [place] [address]  Phone - [phone]   Fax - [phone]  [email or web address]

## 2023-03-10 NOTE — PROGRESS NOTES
Cardiology Follow-up Visit    Chief Complaint/Reason for Visit:   Chief Complaint   Patient presents with   • Follow-up     echo results. s/p TAVR 2017          HISTORY OF PRESENT ILLNESS:     Mrs. Roberson is an 83 y/o woman well known to me. She underwent TAVR on 3/1/17 and has been doing well since then. She is here for a routine follow up visit. Since her last clinic visit she had her right forearm AV graft declotted by Vascular surgery. She denies having chest pain / pressure, no palpitations, no sob. She has no other acute complaints.       Review of Systems   Constitution: Negative for fever.   HENT: Negative for ear pain and hearing loss.    Eyes: Negative for visual disturbance.   Cardiovascular: Negative for chest pain, dyspnea on exertion, palpitations and syncope.   Respiratory: Negative for shortness of breath.    Hematologic/Lymphatic: Negative for bleeding problem.   Skin: Negative for rash.   Gastrointestinal: Negative for abdominal pain, nausea and vomiting.   Genitourinary: Negative for flank pain.   Neurological: Negative for dizziness.   All other systems reviewed and are negative.        Past Medical History:   Diagnosis Date   • Acute bronchiolitis    • Anemia    • CAD (coronary artery disease)    • Cataract    • Diabetes with neurologic complications (CMS/HCC)    • Diastolic HF (heart failure) (CMS/HCC)    • Edema    • ESRD (end stage renal disease) (CMS/HCC)    • Goiter, non-toxic    • Gout    • Hypertensive CKD, ESRD on dialysis (CMS/HCC)    • Hyperthyroidism    • Occlusion and stenosis of other cerebral arteries    • Osteoarthritis    • Peritracheal mass    • Proliferative diabetic retinopathy (CMS/HCC)    • S/P TAVR (transcatheter aortic valve replacement)    • Stroke (cerebrum) (CMS/HCC)    • Thyroid nodule          Past Surgical History:   Procedure Laterality Date   • Anes laparo partial colectomy     • Appendectomy     • Hysterectomy           Family History   Problem Relation Age of  Comprehensive Nutrition Assessment    Type and Reason for Visit: (P) Consult (poor appetite/intake)    Nutrition Recommendations/Plan:   Continue current diet  Nepro 2x/day  Monitor and record PO intakes, supplement acceptance, and Bms in I/Os     Malnutrition Assessment:  Malnutrition Status:  Mild malnutrition (03/10/23 1213)    Context:  Acute illness     Findings of the 6 clinical characteristics of malnutrition:   Energy Intake:  Mild decrease in energy intake (specify)  Weight Loss:  Greater than 7.5% over 3 months     Body Fat Loss:  Unable to assess (contact iso),     Muscle Mass Loss:  Unable to assess,    Fluid Accumulation:  No significant fluid accumulation,     Strength:  Not performed     Nutrition Assessment:    (P) Admitted for ventricular tachycardia. Episodes of hypoglycemia, hyperglycemia. Pt with poor appetite/intake, +significant weight loss x4 months. +nausea/vomiting, abdominal cramps, likely r/t missed HD. Plan to adjust ONS to better meet needs. Labs: Na 129, K 4.1, BUN 28, Creat 5.87, Gluc 95, Alb 1.9. Meds: famotidine, levothyroxine, megestrol, pantoprazole, sevelamer    Nutrition Related Findings:    (P) NFPE deferred r/t contact iso. +n/v, no d/c or problems chewing/swallowing. No edema. BM PTA. Wound Type: (P) None    Current Nutrition Intake & Therapies:  Average Meal Intake: (P) 26-50%  Average Supplement Intake: (P) Unable to assess  ADULT DIET Regular; Low Fat/Low Chol/High Fiber/KAL; Low Potassium (Less than 3000 mg/day); Low Phosphorus (Less than 1000 mg)  ADULT ORAL NUTRITION SUPPLEMENT AM Snack, PM Snack; Standard High Calorie/High Protein    Anthropometric Measures:  Height: (P) 5' 0.98\" (154.9 cm)  Ideal Body Weight (IBW): (P) 105 lbs ((P) 48 kg)  Current Body Wt:  (P) 58.9 kg (129 lb 13.6 oz), (P) 123.7 % IBW.  (P) Bed scale  Current BMI (kg/m2): (P) 24.5  Weight Adjustment: (P) No adjustment  BMI Category: (P) Normal weight (BMI 22.0-24.9) age over 72    Estimated Daily Nutrient Needs:  Energy Requirements Based On: (P) Kcal/kg  Weight Used for Energy Requirements: (P) Current  Energy (kcal/day): (P) 1767kcal (30kcal/kg)  Weight Used for Protein Requirements: (P) Current  Protein (g/day): (P) 71g (1.2g/kg)  Method Used for Fluid Requirements: (P) Standard renal  Fluid (ml/day): (P) 1500mL    Nutrition Diagnosis:   (P) Inadequate oral intake related to (P) catabolic illness, renal dysfunction as evidenced by (P) intake 26-50%, weight loss    Nutrition Interventions:   Food and/or Nutrient Delivery: (P) Continue current diet, Modify oral nutrition supplement  Nutrition Education/Counseling: (P) No recommendations at this time  Coordination of Nutrition Care: (P) Continue to monitor while inpatient    Goals:  Goals: (P) PO intake 50% or greater, by next RD assessment    Nutrition Monitoring and Evaluation:   Behavioral-Environmental Outcomes: (P) None identified  Food/Nutrient Intake Outcomes: (P) Food and nutrient intake, Supplement intake  Physical Signs/Symptoms Outcomes: (P) Meal time behavior, Weight    Discharge Planning:    (P) Too soon to determine    Robert Lopez, [address]: [address] Onset   • Diabetes Sister    • Coronary Artery Disease Brother          Social History     Tobacco Use   • Smoking status: Never Smoker   • Smokeless tobacco: Never Used   Substance Use Topics   • Alcohol use: No     Frequency: Never   • Drug use: No       Drug Use:    No                  ALLERGIES:   Allergen Reactions   • Aspirin HIVES   • Penicillins PRURITUS   • Sulfa Antibiotics RASH         Current Outpatient Medications   Medication Sig Dispense Refill   • insulin aspart (NOVOLOG) 100 UNIT/ML injectable solution Inject 5 Units into the skin 3 times daily.     • sennosides-docusate sodium (SENOKOT-S) 8.6-50 MG tablet Take by mouth daily.     • allopurinol (ZYLOPRIM) 100 MG tablet Take by mouth daily.     • aspirin 81 MG chewable tablet      • atorvastatin (LIPITOR) 40 MG tablet      • epoetin ismael (EPOGEN) 35287 UNIT/ML injection      • Vitamin D, Ergocalciferol, 72813 units capsule TAKE 1 CAPSULE BY MOUTH EVERY MONTHLY     • famotidine (PEPCID) 20 MG tablet Take by mouth daily.     • furosemide (LASIX) 20 MG tablet      • blood glucose (LESLIE CONTOUR TEST) test strip test twice daily     • insulin glargine (LANTUS) 100 UNIT/ML injectable solution Inject into the skin daily.     • isosorbide mononitrate (IMDUR) 30 MG 24 hr tablet Take by mouth daily.     • Lancets (MICROLET) Misc test blood sugar 1-2 times a day     • lisinopril (ZESTRIL) 5 MG tablet      • sevelamer carbonate (RENVELA) 800 MG tablet      • predniSONE (DELTASONE) 20 MG tablet 2 tabs  Twice  Daily  For  5  days 20 tablet 1   • insulin lispro (HUMALOG KWIKPEN) 100 UNIT/ML pen-injector Inject  5  Units  Bid  And  Adjust  Based on  readings 15 mL 12   • ferrous sulfate 325 (65 FE) MG tablet        No current facility-administered medications for this visit.            Visit Vitals  /50   Pulse 84 Comment: reg   Wt 52.2 kg (115 lb)   BMI 21.73 kg/m²       Physical Exam   Constitutional: She is oriented to person, place, and time.   HENT:    Head: Normocephalic.   Eyes: Conjunctivae are normal.   Neck: Normal range of motion. Neck supple. No JVD present.   Cardiovascular: Normal rate and regular rhythm.   Right forearm AV graft with a good thrill   Pulmonary/Chest: Effort normal and breath sounds normal.   Abdominal: Soft. Bowel sounds are normal.   Musculoskeletal: Normal range of motion. She exhibits no edema.   Neurological: She is alert and oriented to person, place, and time.   Skin: Skin is warm and dry.   Psychiatric: She has a normal mood and affect.   Vitals reviewed.        Testing:     ECHO 9/6/2018:  - LVEF 55-60%  - Well seated TAVR valve with trivial AI and a mean gradient of ~ 22 mmHg across the valve    ECHO 4/10/2017:  STUDY CONCLUSIONS  SUMMARY:  1. Left ventricle: The cavity size is normal. Wall thickness is increased.   The estimated ejection fraction is 54%, by single plane method of   disks.  2. Aortic valve: Prior repair procedures include TAVR. Trivial   regurgitation. Mean gradient (S): 9mm Hg.    ECHO 3/3/2017:  STUDY CONCLUSIONS  SUMMARY:  1. Left ventricle: There is concentric hypertrophy. Systolic function is   normal. The estimated ejection fraction is 50-55%, by visual   assessment. Wall motion is normal; there are no regional wall motion   abnormalities. Features are consistent with a pseudonormal left   ventricular filling pattern, with concomitant abnormal relaxation and   increased filling pressure (grade 2 diastolic dysfunction).  2. Aortic valve: Prior repair procedures include TAVR. Mean gradient (S):   20mm Hg. Peak gradient (S): 28mm Hg.  3. Mitral valve: Mild regurgitation.  4. Left atrium: The atrium is mildly to moderately dilated.  5. Right ventricle: Systolic function is normal.  6. Pericardium, extracardiac: A trivial pericardial effusion is   identified.       Labs: Reviewed      IMPRESSION/PLAN:    Acute on Chronic diastolic heart failure:   -she is compensated on exam  -Echo demonstrated EF 55-60%  (9/2018)  -continue with fluid removal via dialysis  -on furosemide 20mg bid  -continue with BP and HR control  -continue to follow up in the CHF clinic as well     Hypertension with hypertensive heart disease:   -BP is at goal for her age  -continue with current meds and to monitor    Severe Aortic Valve Stenosis:   -s/p TAVR with implantation of a 23mm Link 3 Bioprosthetic THV on 3/1/17 via the transapical approach  -1 month follow up echo showed a well seated valve with trivial AI and a mean gradient of 9 mmHg across the valve  - echo done on 9/2018 showed an increase in mean gradient across the aortic valve of ~ 22 mmHg  - repeat echo done on 11/11/18 showed an LVEF of 55-60%, well seated bioprosthetic AV with a mean gradient of ~ 16 mmHg  - will stop her Eliquis and continue wit Aspirin 81mg daily  - will obtain a repeat echo in 6 months    Non-obstructive CAD:  -s/p cardiac cath on 2/14/17  -No symptoms of angina reported  -continue with aggressive medical therapy and risk factor modifications  -on aspirin, statin and BB therapy    Pulmonary HTN: likely related to left sided failure  -continue with medical therapy    Dyslipidemia  -Continue statin therapy.     ESRD: on HD  -Neprhology following    ** Follow up in 6 months        Jody Glaser MD

## 2023-03-10 NOTE — PROGRESS NOTES
Reason for Admission:  cardiac arrest                     RUR Score:          23%           Plan for utilizing home health:      prefers to use "[facility]     PCP: First and Last name:  Jr Nickerson MD     Name of Practice:    Are you a current patient: Yes/No: Yes   Approximate date of last visit: 02/24/23   Can you participate in a virtual visit with your PCP:                     Current Advanced Directive/Advance Care Plan: Full Code  CM confirmed code status with patient's daughters. Healthcare Decision Maker:   Click here to complete [id number] including selection of the Healthcare Decision Maker Relationship (ie \"Primary\")             Primary Decision Maker: Diana Jordan - Daughter - [phone]                  Transition of Care Plan:                      CM met with patient and her daughter, Jay Espinal, at bedside to complete DCP assessment. Patient agreed to allow her daughter to be in the room during the assessment. Patient lives with her sister in a single level home accessible by three steps. She ambulates independently and completes her ADLs without assistance. She drives herself to appointments. Patient prefers to use [facility] for home health services. Patient's daughter will transport her home at discharge. CM will continue to follow.

## 2023-03-10 NOTE — PROGRESS NOTES
TRANSFER - OUT REPORT:    Verbal report given to DEANGELO Andino (name) on Kristen Phillip  being transferred to  (unit) for routine post - op       Report consisted of patients Situation, Background, Assessment and   Recommendations(SBAR). Information from the following report(s) Procedure Summary, MAR, and Quality Measures was reviewed with the receiving nurse. Opportunity for questions and clarification was provided.       Patient transported with:   Registered Nurse & tele

## 2023-03-10 NOTE — PROGRESS NOTES
Problem: Risk for Spread of Infection  Goal: Prevent transmission of infectious organism to others  Description: Prevent the transmission of infectious organisms to other patients, staff members, and visitors. Outcome: Progressing Towards Goal     Problem: Falls - Risk of  Goal: *Absence of Falls  Description: Document Bartolo Holloway Fall Risk and appropriate interventions in the flowsheet. Outcome: Progressing Towards Goal  Note: Fall Risk Interventions:                                Problem: Pressure Injury - Risk of  Goal: *Prevention of pressure injury  Description: Document Quinn Scale and appropriate interventions in the flowsheet. Outcome: Progressing Towards Goal  Note: Pressure Injury Interventions:             Activity Interventions: PT/OT evaluation, Pressure redistribution bed/mattress(bed type)    Mobility Interventions: PT/OT evaluation, HOB 30 degrees or less    Nutrition Interventions: Document food/fluid/supplement intake, Discuss nutritional consult with provider, Offer support with meals,snacks and hydration

## 2023-03-11 ENCOUNTER — APPOINTMENT (OUTPATIENT)
Dept: GENERAL RADIOLOGY | Age: 68
End: 2023-03-11
Attending: INTERNAL MEDICINE
Payer: MEDICARE

## 2023-03-11 ENCOUNTER — APPOINTMENT (OUTPATIENT)
Dept: GENERAL RADIOLOGY | Age: 68
End: 2023-03-11
Attending: HOSPITALIST
Payer: MEDICARE

## 2023-03-11 ENCOUNTER — APPOINTMENT (OUTPATIENT)
Dept: NON INVASIVE DIAGNOSTICS | Age: 68
End: 2023-03-11
Attending: STUDENT IN AN ORGANIZED HEALTH CARE EDUCATION/TRAINING PROGRAM
Payer: MEDICARE

## 2023-03-11 LAB
ALBUMIN SERPL-MCNC: 1.9 G/DL (ref 3.5–5)
ANION GAP SERPL CALC-SCNC: 6 MMOL/L (ref 5–15)
ATRIAL RATE: 66 BPM
BUN SERPL-MCNC: 19 MG/DL (ref 6–20)
BUN/CREAT SERPL: 4 (ref 12–20)
CA-I BLD-MCNC: 6.5 MG/DL (ref 8.5–10.1)
CALCULATED P AXIS, ECG09: 52 DEGREES
CALCULATED R AXIS, ECG10: 35 DEGREES
CALCULATED T AXIS, ECG11: 77 DEGREES
CHLORIDE SERPL-SCNC: 99 MMOL/L (ref 97–108)
CO2 SERPL-SCNC: 28 MMOL/L (ref 21–32)
CREAT SERPL-MCNC: 4.37 MG/DL (ref 0.55–1.02)
DIAGNOSIS, 93000: NORMAL
ECHO AO ASC DIAM: 3.3 CM
ECHO AO ASCENDING AORTA INDEX: 2.04 CM/M2
ECHO AO ROOT DIAM: 3.3 CM
ECHO AO ROOT INDEX: 2.04 CM/M2
ECHO AV AREA PEAK VELOCITY: 2.1 CM2
ECHO AV AREA VTI: 2.2 CM2
ECHO AV AREA/BSA PEAK VELOCITY: 1.3 CM2/M2
ECHO AV AREA/BSA VTI: 1.4 CM2/M2
ECHO AV CUSP MM: 2.2 CM
ECHO AV MEAN GRADIENT: 7 MMHG
ECHO AV MEAN VELOCITY: 1.2 M/S
ECHO AV PEAK GRADIENT: 13 MMHG
ECHO AV PEAK VELOCITY: 1.8 M/S
ECHO AV VELOCITY RATIO: 0.67
ECHO AV VTI: 38.8 CM
ECHO LA AREA 2C: 22.9 CM2
ECHO LA AREA 4C: 17.4 CM2
ECHO LA DIAMETER INDEX: 1.79 CM/M2
ECHO LA DIAMETER: 2.9 CM
ECHO LA MAJOR AXIS: 6 CM
ECHO LA MINOR AXIS: 5.6 CM
ECHO LA TO AORTIC ROOT RATIO: 0.88
ECHO LA VOL BP: 55 ML (ref 22–52)
ECHO LA VOL/BSA BIPLANE: 34 ML/M2 (ref 16–34)
ECHO LV E' LATERAL VELOCITY: 9 CM/S
ECHO LV E' SEPTAL VELOCITY: 8 CM/S
ECHO LV EDV A2C: 83 ML
ECHO LV EDV A4C: 101 ML
ECHO LV EDV INDEX A4C: 62 ML/M2
ECHO LV EDV NDEX A2C: 51 ML/M2
ECHO LV EJECTION FRACTION A2C: 60 %
ECHO LV EJECTION FRACTION A4C: 68 %
ECHO LV EJECTION FRACTION BIPLANE: 65 % (ref 55–100)
ECHO LV ESV A2C: 33 ML
ECHO LV ESV A4C: 33 ML
ECHO LV ESV INDEX A2C: 20 ML/M2
ECHO LV ESV INDEX A4C: 20 ML/M2
ECHO LV FRACTIONAL SHORTENING: 40 % (ref 28–44)
ECHO LV GLOBAL LONGITUDINAL STRAIN (GLS): -13.1 %
ECHO LV GLOBAL LONGITUDINAL STRAIN (GLS): -14.9 %
ECHO LV GLOBAL LONGITUDINAL STRAIN (GLS): -15.6 %
ECHO LV GLOBAL LONGITUDINAL STRAIN (GLS): -15.9 %
ECHO LV INTERNAL DIMENSION DIASTOLE INDEX: 2.59 CM/M2
ECHO LV INTERNAL DIMENSION DIASTOLIC MMODE: 6 CM (ref 3.9–5.3)
ECHO LV INTERNAL DIMENSION DIASTOLIC: 4.2 CM (ref 3.9–5.3)
ECHO LV INTERNAL DIMENSION SYSTOLIC INDEX: 1.54 CM/M2
ECHO LV INTERNAL DIMENSION SYSTOLIC MMODE: 3.8 CM
ECHO LV INTERNAL DIMENSION SYSTOLIC: 2.5 CM
ECHO LV IVSD: 1.2 CM (ref 0.6–0.9)
ECHO LV MASS 2D: 157.1 G (ref 67–162)
ECHO LV MASS INDEX 2D: 97 G/M2 (ref 43–95)
ECHO LV POSTERIOR WALL DIASTOLIC MMODE: 1 CM (ref 0.6–0.9)
ECHO LV POSTERIOR WALL DIASTOLIC: 1 CM (ref 0.6–0.9)
ECHO LV RELATIVE WALL THICKNESS RATIO: 0.48
ECHO LVOT AREA: 3.1 CM2
ECHO LVOT AV VTI INDEX: 0.69
ECHO LVOT DIAM: 2 CM
ECHO LVOT MEAN GRADIENT: 3 MMHG
ECHO LVOT PEAK GRADIENT: 6 MMHG
ECHO LVOT PEAK VELOCITY: 1.2 M/S
ECHO LVOT STROKE VOLUME INDEX: 52.1 ML/M2
ECHO LVOT SV: 84.5 ML
ECHO LVOT VTI: 26.9 CM
ECHO MV A VELOCITY: 0.74 M/S
ECHO MV E DECELERATION TIME (DT): 256 MS
ECHO MV E VELOCITY: 0.91 M/S
ECHO MV E/A RATIO: 1.23
ECHO MV E/E' LATERAL: 10.11
ECHO MV E/E' RATIO (AVERAGED): 10.74
ECHO MV E/E' SEPTAL: 11.38
ECHO MV REGURGITANT PEAK GRADIENT: 74 MMHG
ECHO MV REGURGITANT PEAK VELOCITY: 4.3 M/S
ECHO PULMONARY ARTERY END DIASTOLIC PRESSURE: 5 MMHG
ECHO PV MAX VELOCITY: 1.2 M/S
ECHO PV PEAK GRADIENT: 5 MMHG
ECHO PV REGURGITANT MAX VELOCITY: 1.2 M/S
ECHO RA AREA 4C: 11.5 CM2
ECHO RA END SYSTOLIC VOLUME APICAL 4 CHAMBER INDEX BSA: 17 ML/M2
ECHO RA VOLUME: 27 ML
ECHO RV INTERNAL DIMENSION: 4.3 CM
ECHO RV TAPSE: 2.1 CM (ref 1.7–?)
ECHO RVOT MEAN GRADIENT: 2 MMHG
ECHO RVOT PEAK GRADIENT: 3 MMHG
ECHO RVOT PEAK VELOCITY: 0.9 M/S
ECHO RVOT VTI: 22.5 CM
ECHO TV REGURGITANT MAX VELOCITY: 2.71 M/S
ECHO TV REGURGITANT PEAK GRADIENT: 29 MMHG
GLUCOSE BLD STRIP.AUTO-MCNC: 104 MG/DL (ref 65–100)
GLUCOSE BLD STRIP.AUTO-MCNC: 141 MG/DL (ref 65–100)
GLUCOSE BLD STRIP.AUTO-MCNC: 147 MG/DL (ref 65–100)
GLUCOSE BLD STRIP.AUTO-MCNC: 63 MG/DL (ref 65–100)
GLUCOSE BLD STRIP.AUTO-MCNC: 90 MG/DL (ref 65–100)
GLUCOSE BLD STRIP.AUTO-MCNC: 99 MG/DL (ref 65–100)
GLUCOSE SERPL-MCNC: 79 MG/DL (ref 65–100)
IRON SATN MFR SERPL: 71 % (ref 20–50)
IRON SERPL-MCNC: 54 UG/DL (ref 35–150)
P-R INTERVAL, ECG05: 202 MS
PERFORMED BY, TECHID: ABNORMAL
PERFORMED BY, TECHID: NORMAL
PERFORMED BY, TECHID: NORMAL
PHOSPHATE SERPL-MCNC: 1.9 MG/DL (ref 2.6–4.7)
POTASSIUM SERPL-SCNC: 4.1 MMOL/L (ref 3.5–5.1)
PROCALCITONIN SERPL-MCNC: 43.1 NG/ML
Q-T INTERVAL, ECG07: 520 MS
QRS DURATION, ECG06: 92 MS
QTC CALCULATION (BEZET), ECG08: 545 MS
SODIUM SERPL-SCNC: 133 MMOL/L (ref 136–145)
TIBC SERPL-MCNC: 76 UG/DL (ref 250–450)
VENTRICULAR RATE, ECG03: 66 BPM

## 2023-03-11 PROCEDURE — 93306 TTE W/DOPPLER COMPLETE: CPT

## 2023-03-11 PROCEDURE — 83970 ASSAY OF PARATHORMONE: CPT

## 2023-03-11 PROCEDURE — 71045 X-RAY EXAM CHEST 1 VIEW: CPT

## 2023-03-11 PROCEDURE — 74011000250 HC RX REV CODE- 250: Performed by: INTERNAL MEDICINE

## 2023-03-11 PROCEDURE — 73060 X-RAY EXAM OF HUMERUS: CPT

## 2023-03-11 PROCEDURE — 36415 COLL VENOUS BLD VENIPUNCTURE: CPT

## 2023-03-11 PROCEDURE — 74011250636 HC RX REV CODE- 250/636: Performed by: INTERNAL MEDICINE

## 2023-03-11 PROCEDURE — 74011250637 HC RX REV CODE- 250/637: Performed by: INTERNAL MEDICINE

## 2023-03-11 PROCEDURE — 80069 RENAL FUNCTION PANEL: CPT

## 2023-03-11 PROCEDURE — 87040 BLOOD CULTURE FOR BACTERIA: CPT

## 2023-03-11 PROCEDURE — 93005 ELECTROCARDIOGRAM TRACING: CPT

## 2023-03-11 PROCEDURE — 65270000029 HC RM PRIVATE

## 2023-03-11 PROCEDURE — 84145 PROCALCITONIN (PCT): CPT

## 2023-03-11 PROCEDURE — 74011000258 HC RX REV CODE- 258: Performed by: INTERNAL MEDICINE

## 2023-03-11 PROCEDURE — 94640 AIRWAY INHALATION TREATMENT: CPT

## 2023-03-11 PROCEDURE — 86141 C-REACTIVE PROTEIN HS: CPT

## 2023-03-11 RX ORDER — TRAMADOL HYDROCHLORIDE 50 MG/1
25 TABLET ORAL
Status: DISCONTINUED | OUTPATIENT
Start: 2023-03-11 | End: 2023-03-17 | Stop reason: HOSPADM

## 2023-03-11 RX ORDER — LIDOCAINE 4 G/100G
1 PATCH TOPICAL EVERY 24 HOURS
Status: DISCONTINUED | OUTPATIENT
Start: 2023-03-11 | End: 2023-03-17 | Stop reason: HOSPADM

## 2023-03-11 RX ADMIN — BUDESONIDE AND FORMOTEROL FUMARATE DIHYDRATE 2 PUFF: 160; 4.5 AEROSOL RESPIRATORY (INHALATION) at 09:11

## 2023-03-11 RX ADMIN — TIOTROPIUM BROMIDE INHALATION SPRAY 2 PUFF: 3.12 SPRAY, METERED RESPIRATORY (INHALATION) at 09:11

## 2023-03-11 RX ADMIN — SODIUM CHLORIDE, PRESERVATIVE FREE 10 ML: 5 INJECTION INTRAVENOUS at 15:04

## 2023-03-11 RX ADMIN — CALCITRIOL CAPSULES 0.25 MCG 0.5 MCG: 0.25 CAPSULE ORAL at 09:11

## 2023-03-11 RX ADMIN — FLUTICASONE PROPIONATE 2 SPRAY: 50 SPRAY, METERED NASAL at 09:11

## 2023-03-11 RX ADMIN — CARVEDILOL 25 MG: 12.5 TABLET, FILM COATED ORAL at 09:11

## 2023-03-11 RX ADMIN — MONTELUKAST 10 MG: 10 TABLET, FILM COATED ORAL at 09:11

## 2023-03-11 RX ADMIN — ACETAMINOPHEN 650 MG: 325 TABLET ORAL at 21:22

## 2023-03-11 RX ADMIN — PIPERACILLIN AND TAZOBACTAM 3.38 G: 3; .375 INJECTION, POWDER, FOR SOLUTION INTRAVENOUS at 18:00

## 2023-03-11 RX ADMIN — Medication 1 SPRAY: at 05:59

## 2023-03-11 RX ADMIN — TRAMADOL HYDROCHLORIDE 25 MG: 50 TABLET ORAL at 22:54

## 2023-03-11 RX ADMIN — SODIUM CHLORIDE, PRESERVATIVE FREE 10 ML: 5 INJECTION INTRAVENOUS at 21:23

## 2023-03-11 RX ADMIN — SODIUM CHLORIDE, PRESERVATIVE FREE 10 ML: 5 INJECTION INTRAVENOUS at 06:17

## 2023-03-11 RX ADMIN — CARVEDILOL 25 MG: 12.5 TABLET, FILM COATED ORAL at 17:58

## 2023-03-11 RX ADMIN — PANTOPRAZOLE SODIUM 40 MG: 40 TABLET, DELAYED RELEASE ORAL at 06:30

## 2023-03-11 RX ADMIN — LOSARTAN POTASSIUM 50 MG: 50 TABLET, FILM COATED ORAL at 09:11

## 2023-03-11 RX ADMIN — FAMOTIDINE 20 MG: 20 TABLET ORAL at 09:11

## 2023-03-11 RX ADMIN — PROMETHAZINE HYDROCHLORIDE 12.5 MG: 25 INJECTION INTRAMUSCULAR; INTRAVENOUS at 09:11

## 2023-03-11 RX ADMIN — PIPERACILLIN AND TAZOBACTAM 3.38 G: 3; .375 INJECTION, POWDER, FOR SOLUTION INTRAVENOUS at 06:16

## 2023-03-11 RX ADMIN — BUDESONIDE AND FORMOTEROL FUMARATE DIHYDRATE 2 PUFF: 160; 4.5 AEROSOL RESPIRATORY (INHALATION) at 20:31

## 2023-03-11 RX ADMIN — MEGESTROL ACETATE 200 MG: 40 SUSPENSION ORAL at 09:12

## 2023-03-11 RX ADMIN — CINACALCET HYDROCHLORIDE 30 MG: 30 TABLET, FILM COATED ORAL at 09:11

## 2023-03-11 RX ADMIN — NEPHROCAP 1 CAPSULE: 1 CAP ORAL at 09:11

## 2023-03-11 RX ADMIN — LEVOTHYROXINE SODIUM 100 MCG: 0.1 TABLET ORAL at 06:30

## 2023-03-11 ASSESSMENT — ENCOUNTER SYMPTOMS
DIARRHEA: 0
CONSTIPATION: 0
ABDOMINAL PAIN: 1
RESPIRATORY NEGATIVE: 1
NAUSEA: 1
VOMITING: 1

## 2023-03-11 NOTE — PROGRESS NOTES
Problem: Risk for Spread of Infection  Goal: Prevent transmission of infectious organism to others  Description: Prevent the transmission of infectious organisms to other patients, staff members, and visitors. Outcome: Progressing Towards Goal     Problem: Patient Education:  Go to Education Activity  Goal: Patient/Family Education  Outcome: Progressing Towards Goal     Problem: Falls - Risk of  Goal: *Absence of Falls  Description: Document Elliot Lambert Fall Risk and appropriate interventions in the flowsheet. Outcome: Progressing Towards Goal  Note: Fall Risk Interventions:                                Problem: Patient Education: Go to Patient Education Activity  Goal: Patient/Family Education  Outcome: Progressing Towards Goal     Problem: Pressure Injury - Risk of  Goal: *Prevention of pressure injury  Description: Document Quinn Scale and appropriate interventions in the flowsheet. Outcome: Progressing Towards Goal  Note: Pressure Injury Interventions:       Moisture Interventions: Absorbent underpads, Check for incontinence Q2 hours and as needed, Internal/External urinary devices    Activity Interventions: Increase time out of bed, Pressure redistribution bed/mattress(bed type)    Mobility Interventions: HOB 30 degrees or less, Pressure redistribution bed/mattress (bed type), Turn and reposition approx.  every two hours(pillow and wedges)    Nutrition Interventions: Document food/fluid/supplement intake, Discuss nutritional consult with provider                     Problem: Patient Education: Go to Patient Education Activity  Goal: Patient/Family Education  Outcome: Progressing Towards Goal

## 2023-03-11 NOTE — PROGRESS NOTES
Progress Note    Patient: Keerthi Lilly MRN: [id number]  SSN: [id number]    YOB: 1955  Age: 74 y.o. Sex: female      Admit Date: 3/9/2023    LOS: 2 days     Subjective:     74 y.o. female with PMH of ESRD, failed renal transplant, arrhythmia (?atrial fibrillation), hypertension. She was admitted to [facility] with chief complaint of nausea, vomiting and  abdominal cramps, presumably due to missed hemodialysis. Otherwise limited history obtained from there as patient is withdrawn. While undergoing hemodialysis, patient had cardiac arrest V-tach and Qtc prolongation and intubated. She was subsequently extubated on 3/8. Patient is then transferred to [facility] per cardiology as she will need cardiac cath. On my evaluation, patient reports no active complaints. Specifically no chest pain, shortness of breath abdominal pain nausea and vomiting. CXR possibly shows infiltrates concerning for pneumonia. Await EP on monday    Review of Systems:  Constitutional:  denies weight loss, no fever, no chills, no night sweats  Eye: No recent visual disturbances, no discharge, no double vision  Ear/nose/mouth/throat : No hearing disturbance, no ear pain, no nasal congestion, no sore throat, no trouble swallowing. Respiratory : No trouble breathing, no cough, no shortness of breath, no hemoptysis, no wheezing  Cardiovascular : No chest pain, no palpitation, no racing of heart, no orthopnea, no paroxysmal nocturnal dyspnea, no peripheral edema  Gastrointestinal : No nausea, no vomiting, no diarrhea, constipation, heartburn, abdominal pain  Genitourinary : No dysuria, no hematuria, no increased frequency, incontinence,  Lymphatics : No swollen glands -Neck, axillary, inguinal  Endocrine : No excessive thirst, no polyuria no cold intolerance, no heat intolerance. Immunologic : No hives, urticaria, no seasonal allergies,   Musculoskeletal : Left upper back pain.   No joint swelling, pain, effusion,  no neck pain, Integumentary : No rash, no pruritus, no ecchymosis  Hematology : No petechiae, No easy bruising,  No tendency to bleed easy  Neurology : Denies change in mental status, no abnormal balance, no headache, no confusion, numbness, tingling,  Psychiatric : No mood swings, no anxiety, depression      Objective:     Vitals:    03/11/23 0400 03/11/23 0446 03/11/23 0452 03/11/23 0846   BP:  130/68  (!) 127/56   Pulse: 67 67  67   Resp:  20  20   Temp:  97.5 °F (36.4 °C)  98 °F (36.7 °C)   TempSrc:       SpO2:  100%  100%   Weight:   62.8 kg (138 lb 7.2 oz)    Height:            Intake and Output:  Current Shift: No intake/output data recorded. Last three shifts: No intake/output data recorded. Physical Exam:   General: alert, not cooperative, no distress  Eye: conjunctivae/corneas clear. PERRL, EOM's intact. Throat and Neck: normal and no erythema or exudates noted. No mass   Lung: clear to auscultation bilaterally  Heart: regular rate and rhythm,   Abdomen: soft, non-tender. Bowel sounds normal. No masses,  Extremities: No LE edema. able to move all extremities normal, atraumatic  Skin: Normal.  Neurologic: Motor function and sensation grossly intact.   Psychiatric: withdrawn         Lab/Data Review:  Recent Results (from the past 24 hour(s))   GLUCOSE, POC    Collection Time: 03/10/23 12:54 PM   Result Value Ref Range    Glucose (POC) 88 65 - 100 mg/dL    Performed by [address], POC    Collection Time: 03/10/23  8:52 PM   Result Value Ref Range    Glucose (POC) 138 (H) 65 - 100 mg/dL    Performed by Juan David Ayala    GLUCOSE, POC    Collection Time: 03/11/23 12:59 AM   Result Value Ref Range    Glucose (POC) 147 (H) 65 - 100 mg/dL    Performed by Juan David Ayala    GLUCOSE, POC    Collection Time: 03/11/23  7:36 AM   Result Value Ref Range    Glucose (POC) 99 65 - 100 mg/dL    Performed by Akila SAXENA          Assessment and plan:      (1) VT cardriac arrest: EP to see on Monday for ICD    (2) ESRD on HD: nephrology    (3) pAFIB: on coreg    (4) SIRS with probable aspiration : will continue zosyn. Repeat CRP and procal     (5) Anemia : complicates all aspects of care. Possibly AOCI. Holding AC. (6) HTN : resume home meds    (7) hyponatremia : stable    DVT ppx: SCDs    DISPO: Monday or Tuesday after EP evaluation.      Signed By: Cora Avila MD     March 11, 2023

## 2023-03-11 NOTE — PROGRESS NOTES
Vancomycin Dosing Consult  Crystal Harris is a 74 y.o. female with sepsis. Pharmacy was consulted by Dr. Charles Meza to dose and monitor Vancomycin. Today is day 2. Antibiotic regimen: Vancomycin + Pip/Tazo    Temp (24hrs), Av.1 °F (37.3 °C), Min:97.5 °F (36.4 °C), Max:100.7 °F (38.2 °C)    Recent Labs     03/10/23  0830 03/09/23  2152   WBC 7.8 7.0     Recent Labs     03/10/23  0830 03/09/23  2152   CREA 5.87* 5.07*  5.11*   BUN 28* 25*  25*     No results for input(s): CRP in the last 336 hours. Recent Labs     23   PCT 80.88*       Estimated Creatinine Clearance: 8.1 mL/min (A) (based on SCr of 5.87 mg/dL (H)).  <30 ml/min   HD patient on a Monday & Friday schedule  Concomitant nephrotoxic drugs: None    Cultures:   None    MRSA Swab: N/A    Target range: Trough 21-24 mcg/mL (Intermittent hemodialysis, invasive MRSA infection or sepsis)    Recent level history:  Date/Time Dose & Interval Measured Level (mcg/mL)   3/10 0830 PTA dose from [facility] 14.8      Assessment/Plan:   Afebrile, WBC WNL  HD patient (Monday & Friday regimen)  No dialysis today - hold vancomycin dose today  Pre-HD level scheduled for 3/13 @ 0400  NOTE: Vancomycin dose to be preceded by diphenhydramine 25mg PO as described in Allergies section for patient  Antimicrobial stop date TBD

## 2023-03-11 NOTE — PROGRESS NOTES
CARDIOLOGY PROGRESS NOTE - NP    Patient seen and examined. This is a patient who is followed for VT/cardiac arrest and prolonged QTc. She was transferred from [address] [place] [address] for cardiac cath to rule out CAD as etiology for ventricular arrhythmia. Cath negative for CAD showing only tortuous vessels. She is seen this morning lying in bed comfortably. Denies any chest pain or dyspnea. No lightheadedness or palpitations. No other complaints reported. Telemetry reviewed, there were no events noted in the past 24 hours. Remains in NSR with visibly prolonged QT. Pertinent review of systems items noted above, all other systems are negative. Current medications reviewed. Physical Examination  Vital signs are stable. Blood pressure 130/68, Pulse 67  No apparent distress. Heart is regular, rate and rhythm. Normal S1, S2, no murmurs are appreciated. Lungs are clear bilaterally. Abdomen is soft, nontender, normal bowel sounds. Extremities have no edema. Labs reviewed. Hgb stable in the 8s    Case discussed with Dr. Mary Ann Abbott and our impression and recommendations are as follows:  1) VT cardiac arrest: Was transferred from outside hospital for cardiac catheterization. Troponins were low level. No chest pain. Cath negative. Continue carvedilol and losartan (per renal). She continues to have a long Qtc with serial EKGs. Repeat pending today. Lytes were normal and at the time of Qtc prolonging up to the 700ms. She had been on amiodarone long term previously for atrial fibrillation management and remained on at the time of cardiac arrest.      Discussed an EP consultation with Dr. Clari Alfaro regarding placement of transvenous vs. Subcutaneous ICD for secondary prevention. Prior to cardiac arrest/VT/VF on 3/6 she had last received IV Zofran on 3/5 at 1145. No other QT prolonging agents on board.   However, her home medication list per Epic includes several.  This will be addressed with her as well as consult for device as stated. Dr. Dav Wren likely to see her Monday afternoon. 2) Acute blood loss anemia: Hgb remains in the 8s, down to 5.7 on 3/7. Most recently she has trended in the 8-9 range. DOAC discussed below. 3) ESRD on HD: Nephrology following    4) pAfib: Now in NSR. She had previously been followed by [facility] cardiology and was on long term amiodarone 200mg daily and Eliquis 2.5mg twice daily. Eliquis held following Hgb of 5.7. GI saw her at [facility] and had no plans for intervention. When safe to resume she should be on 5mg twice daily per dosing guidelines. No further amiodarone given prolonged Qtc. Please do not hesitate to call me or Dr. Sariah Kim if additional questions arise. Patient seen and examined patient and agree with the assesment and plan as documented by Kalyani Sotelo NP. Examination findings notable for stable vitals signs . Plan is to continue current cardiac medications as outlined in plan, as well as other items noted.

## 2023-03-11 NOTE — PROGRESS NOTES
Problem: Risk for Spread of Infection  Goal: Prevent transmission of infectious organism to others  Description: Prevent the transmission of infectious organisms to other patients, staff members, and visitors. Outcome: Progressing Towards Goal     Problem: Patient Education:  Go to Education Activity  Goal: Patient/Family Education  Outcome: Progressing Towards Goal     Problem: Falls - Risk of  Goal: *Absence of Falls  Description: Document Anthony Nunez Fall Risk and appropriate interventions in the flowsheet. Outcome: Progressing Towards Goal  Note: Fall Risk Interventions:                                Problem: Patient Education: Go to Patient Education Activity  Goal: Patient/Family Education  Outcome: Progressing Towards Goal     Problem: Pressure Injury - Risk of  Goal: *Prevention of pressure injury  Description: Document Quinn Scale and appropriate interventions in the flowsheet. Outcome: Progressing Towards Goal  Note: Pressure Injury Interventions:             Activity Interventions: Pressure redistribution bed/mattress(bed type)    Mobility Interventions: Pressure redistribution bed/mattress (bed type)    Nutrition Interventions: Document food/fluid/supplement intake                     Problem: Patient Education: Go to Patient Education Activity  Goal: Patient/Family Education  Outcome: Progressing Towards Goal

## 2023-03-11 NOTE — PROGRESS NOTES
Renal Progress Note    NAME:  Wayne Mackey   :   1955   MRN:   [id number]     Date/Time:  3/11/2023 3:40 PM  Subjective:       3/11-feels ok, not SOB, has some nausea and loose stools.          Medications reviewed:  Current Facility-Administered Medications   Medication Dose Route Frequency    lidocaine 4 % patch 1 Patch  1 Patch TransDERmal Q24H    phenol throat spray (CHLORASEPTIC) 1 Spray  1 Spray Oral PRN    piperacillin-tazobactam (ZOSYN) 3.375 g in 0.9% sodium chloride (MBP/ADV) 100 mL MBP  3.375 g IntraVENous Q12H    diphenhydrAMINE (BENADRYL) capsule 25 mg  25 mg Oral Q6H PRN    epoetin jalen-epbx (RETACRIT) injection 8,000 Units  8,000 Units IntraVENous Q MON, WED & FRI    B complex-vitaminC-folic acid (NEPHROCAP) cap  1 Capsule Oral DAILY    albuterol (PROVENTIL HFA, VENTOLIN HFA, PROAIR HFA) inhaler 1 Puff  1 Puff Inhalation Q4H PRN    alum-mag hydroxide-simeth (MYLANTA) oral suspension 10 mL  10 mL Oral Q6H PRN    calcitRIOL (ROCALTROL) capsule 0.5 mcg  0.5 mcg Oral Once per day on Sun Tue Wed Thu Sat    carvediloL (COREG) tablet 25 mg  25 mg Oral BID WITH MEALS    cinacalcet (SENSIPAR) tablet 30 mg  30 mg Oral DAILY    famotidine (PEPCID) tablet 20 mg  20 mg Oral DAILY    fluticasone propionate (FLONASE) 50 mcg/actuation nasal spray 2 Spray  2 Spray Both Nostrils DAILY    losartan (COZAAR) tablet 50 mg  50 mg Oral DAILY    montelukast (SINGULAIR) tablet 10 mg  10 mg Oral DAILY    [Held by provider] sevelamer carbonate (RENVELA) tab 800 mg  800 mg Oral TID    budesonide-formoteroL (SYMBICORT) 160-4.5 mcg/actuation HFA inhaler 2 Puff  2 Puff Inhalation BID RT    And    tiotropium bromide (SPIRIVA RESPIMAT) 2.5 mcg /actuation  2 Puff Inhalation DAILY    sodium chloride (NS) flush 5-40 mL  5-40 mL IntraVENous Q8H    sodium chloride (NS) flush 5-40 mL  5-40 mL IntraVENous PRN    acetaminophen (TYLENOL) tablet 650 mg  650 mg Oral Q6H PRN    Or    acetaminophen (TYLENOL) suppository 650 mg  650 mg Rectal Q6H PRN    polyethylene glycol (MIRALAX) packet 17 g  17 g Oral DAILY PRN    dextrose 10% infusion 250 mL  250 mL IntraVENous PRN    levothyroxine (SYNTHROID) tablet 100 mcg  100 mcg Oral ACB    promethazine (PHENERGAN) 12.5 mg in 0.9% sodium chloride 50 mL IVPB  12.5 mg IntraVENous Q4H PRN    megestroL (MEGACE) 400 mg/10 mL (10 mL) oral suspension 200 mg  200 mg Oral DAILY    pantoprazole (PROTONIX) tablet 40 mg  40 mg Oral ACB        Objective:   Vitals:  Visit Vitals  BP (!) 140/52 (BP 1 Location: Right leg, BP Patient Position: Semi fowlers; Lying)   Pulse 69   Temp 99.9 °F (37.7 °C)   Resp 20   Ht 5' 0.98\" (1.549 m)   Wt 62.8 kg (138 lb 7.2 oz)   SpO2 100%   BMI 26.17 kg/m²     Temp (24hrs), Av.2 °F (37.3 °C), Min:97.5 °F (36.4 °C), Max:100.7 °F (38.2 °C)      O2 Device: None (Room air)    Last 24hr Input/Output:  No intake or output data in the 24 hours ending 23 1540     PHYSICAL EXAM:  General:    Alert, cooperative, no distress, appears stated age. Head:   Normocephalic, without obvious abnormality, atraumatic. Eyes:   Conjunctivae/corneas clear. Neck:  Supple, no JVD. Lungs:   Clear to auscultation bilaterally. No Wheezing or Rhonchi. No rales. Heart:   Regular rate and rhythm,  no murmur, rub or gallop. Abdomen:   Soft, non-tender. Not distended.   Bowel sounds normal.   Extremities: No edema, left arm AVF with bruit  Skin:     dry       [] Telemetry Reviewed     [] NSR [] PAC/PVCs   [] Afib  [] Paced   [] NSVT   [] Jackson [] NGT  [] Intubated on vent    Lab Data Reviewed:    Recent Results (from the past 24 hour(s))   GLUCOSE, POC    Collection Time: 03/10/23  8:52 PM   Result Value Ref Range    Glucose (POC) 138 (H) 65 - 100 mg/dL    Performed by [facility] [employer]    GLUCOSE, POC    Collection Time: 23 12:59 AM   Result Value Ref Range    Glucose (POC) 147 (H) 65 - 100 mg/dL    Performed by Frederic Prajapati    GLUCOSE, POC    Collection Time: 23  7:36 AM   Result Value Ref Range    Glucose (POC) 99 65 - 100 mg/dL    Performed by [email or web address] [place] [address], POC    Collection Time: 03/11/23 11:15 AM   Result Value Ref Range    Glucose (POC) 90 65 - 100 mg/dL    Performed by Seamus Jennings ([employer] [place])    ECHO ADULT COMPLETE    Collection Time: 03/11/23 12:09 PM   Result Value Ref Range    LV EDV A2C 83 mL    LV EDV A4C 101 mL    LV ESV A2C 33 mL    LV ESV A4C 33 mL    IVSd 1.2 (A) 0.6 - 0.9 cm    LVIDd 4.2 3.9 - 5.3 cm    LVIDd M-mode 6.0 (A) 3.9 - 5.3 cm    LVIDs 2.5 cm    LVIDs M-mode 3.8 cm    LVOT Diameter 2.0 cm    LVOT Mean Gradient 3 mmHg    LVOT VTI 26.9 cm    LVOT Peak Velocity 1.2 m/s    LVOT Peak Gradient 6 mmHg    LVPWd 1.0 (A) 0.6 - 0.9 cm    LVPWd M-mode 1.0 (A) 0.6 - 0.9 cm    LV E' Lateral Velocity 9 cm/s    LV E' Septal Velocity 8 cm/s    Global Longitudinal Strain -15.9 %    Global Longitudinal Strain -15.6 %    Global Longitudinal Strain -13.1 %    Global Longitudinal Strain -14.9 %    LV Ejection Fraction A2C 60 %    LV Ejection Fraction A4C 68 %    EF BP 65 55 - 100 %    LVOT Area 3.1 cm2    LVOT SV 84.5 ml    LA Minor Axis 5.6 cm    LA Major Axis 6.0 cm    LA Area 2C 22.9 cm2    LA Area 4C 17.4 cm2    LA Volume BP 55 (A) 22 - 52 mL    LA Diameter 2.9 cm    RA Area 4C 11.5 cm2    RA Volume 27 ml    AV Cusp Mmode 2.2 cm    AV Mean Gradient 7 mmHg    AV VTI 38.8 cm    AV Mean Velocity 1.2 m/s    AV Peak Velocity 1.8 m/s    AV Peak Gradient 13 mmHg    AV Area by VTI 2.2 cm2    AV Area by Peak Velocity 2.1 cm2    Aortic Root 3.3 cm    Ascending Aorta 3.3 cm    MR Peak Velocity 4.3 m/s    MR Peak Gradient 74 mmHg    MV E Wave Deceleration Time 256.0 ms    MV A Velocity 0.74 m/s    MV E Velocity 0.91 m/s    ME Max Velocity 1.2 m/s    Pulmonary Artery EDP 5 mmHg    PV Max Velocity 1.2 m/s    PV Peak Gradient 5 mmHg    RVIDd 4.3 cm    RVOT Mean Gradient 2 mmHg    RVOT VTI 22.5 cm    RVOT Peak Velocity 0.9 m/s    RVOT Peak Gradient 3 mmHg    TAPSE 2.1 1.7 cm    TR Max Velocity 2.71 m/s    TR Peak Gradient 29 mmHg    Fractional Shortening 2D 40 28 - 44 %    LV ESV Index A4C 20 mL/m2    LV EDV Index A4C 62 mL/m2    LV ESV Index A2C 20 mL/m2    LV EDV Index A2C 51 mL/m2    LVIDd Index 2.59 cm/m2    LVIDs Index 1.54 cm/m2    LV RWT Ratio 0.48     LV Mass 2D 157.1 67 - 162 g    LV Mass 2D Index 97.0 (A) 43 - 95 g/m2    MV E/A 1.23     E/E' Ratio (Averaged) 10.74     E/E' Lateral 10.11     E/E' Septal 11.38     LA Volume Index BP 34 16 - 34 ml/m2    LVOT Stroke Volume Index 52.1 mL/m2    LA Size Index 1.79 cm/m2    LA/AO Root Ratio 0.88     RA Volume Index A4C 17 mL/m2    Ao Root Index 2.04 cm/m2    Ascending Aorta Index 2.04 cm/m2    AV Velocity Ratio 0.67     LVOT:AV VTI Index 0.69     ARPIAT/BSA VTI 1.4 cm2/m2    ARPITA/BSA Peak Velocity 1.3 cm2/m2         Assessment/Plan:   Active Problems:    Ventricular tachyarrhythmia (3/9/2023)      End-stage renal disease on hemodialysis Mon and Fridays  Hypertension. Anemia chronic kidney disease. S/p V. tach arrest.  Failed renal transplant. Hyponatremia. Hypophosphatemia. Plan:    S/p HD 3/10  HD while inpatient MWF  No need for HD today  No labs done today    Medications should be dosed for her GFR. The target phosphorus level is 3 to 5. Her phosphorus level was 1.7 . phosphate binder (sevelamer) on hold     renal panel/phos tomorrow    She has anemia of chronic kidney disease. The target hemoglobin is 10 to 11 g/dL. Epogen will be provided during her HD sessions. Will request her iron stores as well. Check the PTH level in light of the likelihood of secondary hyperparathyroidism. The 25-hydroxy vitamin D level will be assessed as well. Vitamin D deficiency is not unusual in patients with end-stage renal disease. Hyponatremia is likely secondary to impaired free water clearance which is not unusual in patients with end-stage renal disease. A fluid restriction of 1.5 L/day seems reasonable.     Follow CBC and electrolytes .    ___________________________________________________    Total time spent with patient:  [] 15   [] 25   [] 35   []  __ minutes    [] Critical Care Provided    Care Plan discussed with:    [x] Patient   [] Family    [] Care Manager   [] Consultant/Specialist :      []   >50% of visit spent in counseling and coordination of care   (Discussed [] CODE status,  [] Care Plan, [] D/C Planning)    ___________________________________________________    Attending Physician: MD Wisam Hernandez

## 2023-03-12 LAB
BACTERIA SPEC CULT: NORMAL
BACTERIA SPEC CULT: NORMAL
GLUCOSE BLD STRIP.AUTO-MCNC: 73 MG/DL (ref 65–100)
Lab: NORMAL
Lab: NORMAL
PERFORMED BY, TECHID: NORMAL

## 2023-03-12 PROCEDURE — 93005 ELECTROCARDIOGRAM TRACING: CPT

## 2023-03-12 PROCEDURE — 94640 AIRWAY INHALATION TREATMENT: CPT

## 2023-03-12 PROCEDURE — 94640 AIRWAY INHALATION TREATMENT: CPT | Performed by: INTERNAL MEDICINE

## 2023-03-12 PROCEDURE — 82962 GLUCOSE BLOOD TEST: CPT

## 2023-03-12 PROCEDURE — 74011250637 HC RX REV CODE- 250/637: Performed by: INTERNAL MEDICINE

## 2023-03-12 PROCEDURE — 74011000250 HC RX REV CODE- 250: Performed by: INTERNAL MEDICINE

## 2023-03-12 PROCEDURE — 74011000258 HC RX REV CODE- 258: Performed by: INTERNAL MEDICINE

## 2023-03-12 PROCEDURE — 65270000029 HC RM PRIVATE

## 2023-03-12 PROCEDURE — 74011250636 HC RX REV CODE- 250/636: Performed by: INTERNAL MEDICINE

## 2023-03-12 RX ADMIN — PANTOPRAZOLE SODIUM 40 MG: 40 TABLET, DELAYED RELEASE ORAL at 06:38

## 2023-03-12 RX ADMIN — MEGESTROL ACETATE 200 MG: 40 SUSPENSION ORAL at 08:58

## 2023-03-12 RX ADMIN — FAMOTIDINE 20 MG: 20 TABLET ORAL at 08:58

## 2023-03-12 RX ADMIN — LEVOTHYROXINE SODIUM 100 MCG: 0.1 TABLET ORAL at 06:38

## 2023-03-12 RX ADMIN — SODIUM CHLORIDE, PRESERVATIVE FREE 10 ML: 5 INJECTION INTRAVENOUS at 23:16

## 2023-03-12 RX ADMIN — PIPERACILLIN AND TAZOBACTAM 3.38 G: 3; .375 INJECTION, POWDER, FOR SOLUTION INTRAVENOUS at 18:05

## 2023-03-12 RX ADMIN — MONTELUKAST 10 MG: 10 TABLET, FILM COATED ORAL at 08:58

## 2023-03-12 RX ADMIN — CINACALCET HYDROCHLORIDE 30 MG: 30 TABLET, FILM COATED ORAL at 08:58

## 2023-03-12 RX ADMIN — FLUTICASONE PROPIONATE 2 SPRAY: 50 SPRAY, METERED NASAL at 09:08

## 2023-03-12 RX ADMIN — NEPHROCAP 1 CAPSULE: 1 CAP ORAL at 08:58

## 2023-03-12 RX ADMIN — BUDESONIDE AND FORMOTEROL FUMARATE DIHYDRATE 2 PUFF: 160; 4.5 AEROSOL RESPIRATORY (INHALATION) at 08:57

## 2023-03-12 RX ADMIN — CARVEDILOL 25 MG: 12.5 TABLET, FILM COATED ORAL at 16:38

## 2023-03-12 RX ADMIN — SODIUM CHLORIDE, PRESERVATIVE FREE 10 ML: 5 INJECTION INTRAVENOUS at 06:33

## 2023-03-12 RX ADMIN — BUDESONIDE AND FORMOTEROL FUMARATE DIHYDRATE 2 PUFF: 160; 4.5 AEROSOL RESPIRATORY (INHALATION) at 21:10

## 2023-03-12 RX ADMIN — TIOTROPIUM BROMIDE INHALATION SPRAY 2 PUFF: 3.12 SPRAY, METERED RESPIRATORY (INHALATION) at 08:57

## 2023-03-12 RX ADMIN — LOSARTAN POTASSIUM 50 MG: 50 TABLET, FILM COATED ORAL at 08:58

## 2023-03-12 RX ADMIN — PROMETHAZINE HYDROCHLORIDE 12.5 MG: 25 INJECTION INTRAMUSCULAR; INTRAVENOUS at 16:38

## 2023-03-12 RX ADMIN — CALCITRIOL CAPSULES 0.25 MCG 0.5 MCG: 0.25 CAPSULE ORAL at 08:58

## 2023-03-12 RX ADMIN — SODIUM CHLORIDE, PRESERVATIVE FREE 10 ML: 5 INJECTION INTRAVENOUS at 14:28

## 2023-03-12 RX ADMIN — CARVEDILOL 25 MG: 12.5 TABLET, FILM COATED ORAL at 08:58

## 2023-03-12 RX ADMIN — PIPERACILLIN AND TAZOBACTAM 3.38 G: 3; .375 INJECTION, POWDER, FOR SOLUTION INTRAVENOUS at 06:38

## 2023-03-12 NOTE — PROGRESS NOTES
Problem: Risk for Spread of Infection  Goal: Prevent transmission of infectious organism to others  Description: Prevent the transmission of infectious organisms to other patients, staff members, and visitors. Outcome: Progressing Towards Goal     Problem: Patient Education:  Go to Education Activity  Goal: Patient/Family Education  Outcome: Progressing Towards Goal     Problem: Falls - Risk of  Goal: *Absence of Falls  Description: Document Nereida Philippe Fall Risk and appropriate interventions in the flowsheet. Outcome: Progressing Towards Goal  Note: Fall Risk Interventions:                                Problem: Patient Education: Go to Patient Education Activity  Goal: Patient/Family Education  Outcome: Progressing Towards Goal     Problem: Pressure Injury - Risk of  Goal: *Prevention of pressure injury  Description: Document Quinn Scale and appropriate interventions in the flowsheet.   Outcome: Progressing Towards Goal  Note: Pressure Injury Interventions:  Sensory Interventions: Assess changes in LOC, Check visual cues for pain, Minimize linen layers    Moisture Interventions: Absorbent underpads, Internal/External urinary devices    Activity Interventions: Pressure redistribution bed/mattress(bed type)    Mobility Interventions: HOB 30 degrees or less, Pressure redistribution bed/mattress (bed type)    Nutrition Interventions: Document food/fluid/supplement intake, Offer support with meals,snacks and hydration                     Problem: Patient Education: Go to Patient Education Activity  Goal: Patient/Family Education  Outcome: Progressing Towards Goal

## 2023-03-12 NOTE — PROGRESS NOTES
Progress Note    Patient: Camryn Buenrostro MRN: [id number]  SSN: [id number]    YOB: 1955  Age: 74 y.o. Sex: female      Admit Date: 3/9/2023    LOS: 3 days     Subjective:     74 y.o. female with PMH of ESRD, failed renal transplant, arrhythmia (?atrial fibrillation), hypertension. She was admitted to [facility] with chief complaint of nausea, vomiting and  abdominal cramps, presumably due to missed hemodialysis. Otherwise limited history obtained from there as patient is withdrawn. While undergoing hemodialysis, patient had cardiac arrest V-tach and Qtc prolongation and intubated. She was subsequently extubated on 3/8. Patient is then transferred to [facility] per cardiology as she will need cardiac cath. On my evaluation, patient reports no active complaints. Specifically no chest pain, shortness of breath abdominal pain nausea and vomiting. CXR possibly shows infiltrates concerning for pneumonia. Await EP on monday    Review of Systems:  Constitutional:  denies weight loss, no fever, no chills, no night sweats  Eye: No recent visual disturbances, no discharge, no double vision  Ear/nose/mouth/throat : No hearing disturbance, no ear pain, no nasal congestion, no sore throat, no trouble swallowing. Respiratory : No trouble breathing, no cough, no shortness of breath, no hemoptysis, no wheezing  Cardiovascular : No chest pain, no palpitation, no racing of heart, no orthopnea, no paroxysmal nocturnal dyspnea, no peripheral edema  Gastrointestinal : No nausea, no vomiting, no diarrhea, constipation, heartburn, abdominal pain  Genitourinary : No dysuria, no hematuria, no increased frequency, incontinence,  Lymphatics : No swollen glands -Neck, axillary, inguinal  Endocrine : No excessive thirst, no polyuria no cold intolerance, no heat intolerance. Immunologic : No hives, urticaria, no seasonal allergies,   Musculoskeletal : Left upper back pain.   No joint swelling, pain, effusion,  no neck pain, Integumentary : No rash, no pruritus, no ecchymosis  Hematology : No petechiae, No easy bruising,  No tendency to bleed easy  Neurology : Denies change in mental status, no abnormal balance, no headache, no confusion, numbness, tingling,  Psychiatric : No mood swings, no anxiety, depression      Objective:     Vitals:    03/12/23 0438 03/12/23 0529 03/12/23 0851 03/12/23 1304   BP: 136/65  (!) 141/63 (!) 143/63   Pulse: 63  69 67   Resp: 20  20 20   Temp: 98 °F (36.7 °C)  98 °F (36.7 °C) 98.5 °F (36.9 °C)   TempSrc:       SpO2: 100%  98% 98%   Weight:  63.6 kg (140 lb 3.4 oz)     Height:            Intake and Output:  Current Shift: No intake/output data recorded. Last three shifts: No intake/output data recorded. Physical Exam:   General: alert, not cooperative, no distress  Eye: conjunctivae/corneas clear. PERRL, EOM's intact. Throat and Neck: normal and no erythema or exudates noted. No mass   Lung: clear to auscultation bilaterally  Heart: regular rate and rhythm,   Abdomen: soft, non-tender. Bowel sounds normal. No masses,  Extremities: No LE edema. able to move all extremities normal, atraumatic  Skin: Normal.  Neurologic: Motor function and sensation grossly intact.   Psychiatric: withdrawn         Lab/Data Review:  Recent Results (from the past 24 hour(s))   RENAL FUNCTION PANEL    Collection Time: 03/11/23  2:59 PM   Result Value Ref Range    Sodium 133 (L) 136 - 145 mmol/L    Potassium 4.1 3.5 - 5.1 mmol/L    Chloride 99 97 - 108 mmol/L    CO2 28 21 - 32 mmol/L    Anion gap 6 5 - 15 mmol/L    Glucose 79 65 - 100 mg/dL    BUN 19 6 - 20 mg/dL    Creatinine 4.37 (H) 0.55 - 1.02 mg/dL    BUN/Creatinine ratio 4 (L) 12 - 20      eGFR 11 (L) >60 ml/min/1.73m2    Calcium 6.5 (L) 8.5 - 10.1 mg/dL    Phosphorus 1.9 (L) 2.6 - 4.7 mg/dL    Albumin 1.9 (L) 3.5 - 5.0 g/dL   PROCALCITONIN    Collection Time: 03/11/23  2:59 PM   Result Value Ref Range    Procalcitonin 43.10 (H) 0 ng/mL   CULTURE, BLOOD Collection Time: 03/11/23  3:59 PM    Specimen: Blood   Result Value Ref Range    Special Requests: No Special Requests  Port        Culture result: No growth after 3 hours     GLUCOSE, POC    Collection Time: 03/11/23  4:51 PM   Result Value Ref Range    Glucose (POC) 104 (H) 65 - 100 mg/dL    Performed by Tab Lovell [facility])    GLUCOSE, POC    Collection Time: 03/12/23 12:28 PM   Result Value Ref Range    Glucose (POC) 73 65 - 100 mg/dL    Performed by Fly Prater          Assessment and plan:      (1) VT cardriac arrest: EP to see on Monday for ICD    (2) ESRD on HD: nephrology    (3) pAFIB: on coreg    (4) SIRS with probable aspiration : will continue zosyn. Repeat CRP and procal     (5) Anemia : complicates all aspects of care. Possibly AOCI. Holding AC. (6) HTN : resume home meds    (7) hyponatremia : stable    DVT ppx: SCDs    DISPO: Monday or Tuesday after EP evaluation.      Signed By: Elis Napier MD     March 12, 2023

## 2023-03-12 NOTE — PROGRESS NOTES
Vascular Access Team Consult Note: received consult from primary care nurse, Qing Rene, and charge nurse, Siobhan, for RUE line assessment present on admission/transfer from [facility]. Discussed with Sina primary care nurse, and August, charge nurse, [facility] protocol for line present on admission; protocol includes provider approval to utilize line from outside facility, peripheral blood cultures per provider approval, line tip placement confirmation by x-ray, dressing change with needle-free connector change per hospital policy and procedures, confirmation of blood return of each lumen, and IV bag/tubing change with new vascular access device. Per chest x-ray results 3/9/2023 @ 2030, report states, \"A PICC is not identified. \"    Client assessed and 5 Fr double-lumen line present has printed \"PowerPICC 5 Fr\" and has SOLO valves; line present in right upper arm, 5 finger-breaths above AC line. Please see line assessment details in client's flowsheet/[facility] in [place]. Positive blood return from both lumens, purple and red, and flushed easily with 10 mL NS per push-pause method. Q-site needle-free connectors changed to [facility] blue needle-free connectors with flush per hospital policy and procedures, after scrubbing with alcohol. Client tolerated well, no complaints, no complications noted. A dressing change was performed on client's RUE line, as described previously, above (midline/portacath/PICC line/CVC). All documentation for the dressing change and condition of the insertion site was completed on the flowsheet/[facility]atar in [facility]. Supplies and equipment were organized at the bedside to decrease the amount of time that the site was open. Dressing changed with 3M PICC Dressing kit with 3M securement device per hospital policy; RUE line dressed with sterile Cavilon skin barrier swab, 3M line securement device, and 3M CHG wafer transparent 2-part PICC Tegaderm dressing.  The dressing was labeled with date of dressing change, initials, and time. Patient tolerated procedure without complaint of discomfort. No complications noted. Discussed updates with primary care nurse, Alva Sheridan, at bedside. Chest x-ray 3/11/23 @ 1850 results pending for PICC/midline tip placement verification per Radiologist read. Alva Sheridan, primary care nurse, called Dr. Edwin Bergeron, covering in-patient Hospitalist, and discussed. I discussed with Dr. Edwin Bergeron on same phone call that client's line has been in use and infusing without signs of infiltration, both lumens have positive blood return, and client denies pain of discomfort. I discussed with Dr. Edwin Bergeron line protocol present on admission and x-ray's pending at this time. Telephone orders with read back received from Dr. Edwin Bergeron to continue to utilize line present in [place] [facility], suspected of being PICC cut and utilized as midline previously placed prior to admission/transfer to [facility], as client has no other reliable vascular access, currently working without difficulty, and client is an active HD patient with limited access options. Telephone orders with read back received for nursing to notify nephrology on dayshift tomorrow of current line, as previously placed and infusing without difficulty, to discuss any plans to exchange line out as indicated by provider, as client is active HD patient. Line protocol orders placed per hospital protocol. X-ray results pending for line tip placement and results do not change current plan-of care as previously discussed with Dr. Edwin Bergeron at this time. Plan to utilize as MIDLINE, not PICC, until otherwise determined; notified and discussed with Georgette Puga charge nurse. Please re-consult PICC Team for any further vascular access needs or for additional RUE line assessment needs.      Carmelia Bloch, RN

## 2023-03-12 NOTE — PROGRESS NOTES
CARDIOLOGY PROGRESS NOTE - NP     Patient seen and examined. This is a patient who is followed for VT/cardiac arrest and prolonged QTc. She was transferred from [facility] for cardiac cath to rule out CAD as etiology for ventricular arrhythmia. Cath negative for CAD showing only tortuous vessels. She is seen this morning lying in bed comfortably. Continues to deny any chest pain or dyspnea. No lightheadedness or palpitations. No other complaints reported. Telemetry reviewed, there were no events noted in the past 24 hours. Remains in NSR with visibly prolonged QT. Pertinent review of systems items noted above, all other systems are negative. Current medications reviewed. Physical Examination  Vital signs are stable. Blood pressure 142/61 Pulse 67  No apparent distress. Heart is regular, rate and rhythm. Normal S1, S2, no murmurs are appreciated. Lungs are clear bilaterally. Abdomen is soft, nontender, normal bowel sounds. Extremities have no edema. Labs reviewed. Hgb stable in the 8s     Case discussed with Dr. Nomi Decker and our impression and recommendations are as follows:  1) VT cardiac arrest: Was transferred from outside hospital for cardiac catheterization. Troponins were low level. No chest pain. Cath negative. Continue carvedilol and losartan (per renal). She continues to have a long Qtc with serial EKGs. Repeat pending today. Lytes were normal and at the time of Qtc prolonging up to the 700ms. She had been on amiodarone long term previously for atrial fibrillation management and remained on at the time of cardiac arrest.       Discussed an EP consultation with Dr. Santy Reyna regarding placement of transvenous vs. Subcutaneous ICD for secondary prevention. Prior to cardiac arrest/VT/VF on 3/6 she had last received IV Zofran on 3/5 at 1145. No other QT prolonging agents on board.   However, her home medication list per Epic includes several.  This will be addressed with her as well as consult for device as stated. Dr. Polo Cranker likely to see her Monday afternoon. Discussed potential ICD placement with the patient today for secondary prevention. She is open to a discussion with Dr. Polo Cranker tomorrow. 2) Acute blood loss anemia: Hgb remains in the 8s, down to 5.7 on 3/7. Most recently she has trended in the 8-9 range. DOAC discussed below. 3) ESRD on HD: Nephrology following     4) pAfib: Now in NSR. She had previously been followed by [facility] cardiology and was on long term amiodarone 200mg daily and Eliquis 2.5mg twice daily. Eliquis held following Hgb of 5.7. GI saw her at [facility] and had no plans for intervention. When safe to resume she should be on 5mg twice daily per dosing guidelines. No further amiodarone given prolonged Qtc. Please do not hesitate to call me or Dr. Simin Keene if additional questions arise.

## 2023-03-12 NOTE — PROGRESS NOTES
Renal Progress Note    NAME:  Rachelle Su   :   1955   MRN:   [id number]     Date/Time:  3/12/2023 3:40 PM  Subjective:       3/11-feels ok, not SOB, has some nausea and loose stools.    3/12-c/o dysuria, no SOB        Medications reviewed:  Current Facility-Administered Medications   Medication Dose Route Frequency    lidocaine 4 % patch 1 Patch  1 Patch TransDERmal Q24H    phenol throat spray (CHLORASEPTIC) 1 Spray  1 Spray Oral PRN    traMADoL (ULTRAM) tablet 25 mg  25 mg Oral Q6H PRN    piperacillin-tazobactam (ZOSYN) 3.375 g in 0.9% sodium chloride (MBP/ADV) 100 mL MBP  3.375 g IntraVENous Q12H    diphenhydrAMINE (BENADRYL) capsule 25 mg  25 mg Oral Q6H PRN    epoetin jalen-epbx (RETACRIT) injection 8,000 Units  8,000 Units IntraVENous Q MON, WED &  complex-vitaminC-folic acid (NEPHROCAP) cap  1 Capsule Oral DAILY    albuterol (PROVENTIL HFA, VENTOLIN HFA, PROAIR HFA) inhaler 1 Puff  1 Puff Inhalation Q4H PRN    alum-mag hydroxide-simeth (MYLANTA) oral suspension 10 mL  10 mL Oral Q6H PRN    calcitRIOL (ROCALTROL) capsule 0.5 mcg  0.5 mcg Oral Once per day on Sun Tue Wed Thu Sat    carvediloL (COREG) tablet 25 mg  25 mg Oral BID WITH MEALS    [Held by provider] cinacalcet (SENSIPAR) tablet 30 mg  30 mg Oral DAILY    famotidine (PEPCID) tablet 20 mg  20 mg Oral DAILY    fluticasone propionate (FLONASE) 50 mcg/actuation nasal spray 2 Spray  2 Spray Both Nostrils DAILY    losartan (COZAAR) tablet 50 mg  50 mg Oral DAILY    montelukast (SINGULAIR) tablet 10 mg  10 mg Oral DAILY    [Held by provider] sevelamer carbonate (RENVELA) tab 800 mg  800 mg Oral TID    budesonide-formoteroL (SYMBICORT) 160-4.5 mcg/actuation HFA inhaler 2 Puff  2 Puff Inhalation BID RT    And    tiotropium bromide (SPIRIVA RESPIMAT) 2.5 mcg /actuation  2 Puff Inhalation DAILY    sodium chloride (NS) flush 5-40 mL  5-40 mL IntraVENous Q8H    sodium chloride (NS) flush 5-40 mL  5-40 mL IntraVENous PRN    acetaminophen (TYLENOL) tablet 650 mg  650 mg Oral Q6H PRN    Or    acetaminophen (TYLENOL) suppository 650 mg  650 mg Rectal Q6H PRN    polyethylene glycol (MIRALAX) packet 17 g  17 g Oral DAILY PRN    dextrose 10% infusion 250 mL  250 mL IntraVENous PRN    levothyroxine (SYNTHROID) tablet 100 mcg  100 mcg Oral ACB    promethazine (PHENERGAN) 12.5 mg in 0.9% sodium chloride 50 mL IVPB  12.5 mg IntraVENous Q4H PRN    megestroL (MEGACE) 400 mg/10 mL (10 mL) oral suspension 200 mg  200 mg Oral DAILY    pantoprazole (PROTONIX) tablet 40 mg  40 mg Oral ACB        Objective:   Vitals:  Visit Vitals  BP (!) 143/63 (BP 1 Location: Right leg, BP Patient Position: Semi fowlers)   Pulse 67   Temp 98.5 °F (36.9 °C)   Resp 20   Ht 5' 0.98\" (1.549 m)   Wt 63.6 kg (140 lb 3.4 oz)   SpO2 98%   BMI 26.51 kg/m²     Temp (24hrs), Av.9 °F (37.2 °C), Min:98 °F (36.7 °C), Max:100.9 °F (38.3 °C)      O2 Device: None (Room air)    Last 24hr Input/Output:  No intake or output data in the 24 hours ending 23 1457     PHYSICAL EXAM:  General:    Alert, cooperative, no distress, appears stated age. Head:   Normocephalic, without obvious abnormality, atraumatic. Eyes:   Conjunctivae/corneas clear. Neck:  Supple, no JVD. Lungs:   Clear to auscultation bilaterally. No Wheezing or Rhonchi. No rales. Heart:   Regular rate and rhythm,  no murmur, rub or gallop. Abdomen:   Soft, non-tender. Not distended.   Bowel sounds normal.   Extremities: No edema, left arm AVF with bruit  Skin:     dry       [] Telemetry Reviewed     [] NSR [] PAC/PVCs   [] Afib  [] Paced   [] NSVT   [] Jackson [] NGT  [] Intubated on vent    Lab Data Reviewed:    Recent Results (from the past 24 hour(s))   RENAL FUNCTION PANEL    Collection Time: 23  2:59 PM   Result Value Ref Range    Sodium 133 (L) 136 - 145 mmol/L    Potassium 4.1 3.5 - 5.1 mmol/L    Chloride 99 97 - 108 mmol/L    CO2 28 21 - 32 mmol/L    Anion gap 6 5 - 15 mmol/L    Glucose 79 65 - 100 mg/dL BUN 19 6 - 20 mg/dL    Creatinine 4.37 (H) 0.55 - 1.02 mg/dL    BUN/Creatinine ratio 4 (L) 12 - 20      eGFR 11 (L) >60 ml/min/1.73m2    Calcium 6.5 (L) 8.5 - 10.1 mg/dL    Phosphorus 1.9 (L) 2.6 - 4.7 mg/dL    Albumin 1.9 (L) 3.5 - 5.0 g/dL   PROCALCITONIN    Collection Time: 03/11/23  2:59 PM   Result Value Ref Range    Procalcitonin 43.10 (H) 0 ng/mL   CULTURE, BLOOD    Collection Time: 03/11/23  3:59 PM    Specimen: Blood   Result Value Ref Range    Special Requests: No Special Requests  Port        Culture result: No growth after 3 hours     GLUCOSE, POC    Collection Time: 03/11/23  4:51 PM   Result Value Ref Range    Glucose (POC) 104 (H) 65 - 100 mg/dL    Performed by [facility])    GLUCOSE, POC    Collection Time: 03/12/23 12:28 PM   Result Value Ref Range    Glucose (POC) 73 65 - 100 mg/dL    Performed by Emerson Dowling          Assessment/Plan:   Active Problems:    Ventricular tachyarrhythmia (3/9/2023)    End-stage renal disease on hemodialysis Mon and Fridays  Hypertension. Anemia chronic kidney disease. S/p V. tach arrest.  Failed renal transplant. Hyponatremia. Hypophosphatemia. Plan:    S/p HD 3/10  HD while inpatient MWF  Low calcium, will hold cinacalcet pending PTH result    Medications should be dosed for her GFR. The target phosphorus level is 3 to 5. Her phosphorus level was 1.7 . phosphate binder (sevelamer) on hold     renal panel/phos tomorrow    She has anemia of chronic kidney disease. The target hemoglobin is 10 to 11 g/dL. Epogen will be provided during her HD sessions. Will request her iron stores as well. Check the PTH level in light of the likelihood of secondary hyperparathyroidism. The 25-hydroxy vitamin D level will be assessed as well. Vitamin D deficiency is not unusual in patients with end-stage renal disease. Hyponatremia is likely secondary to impaired free water clearance which is not unusual in patients with end-stage renal disease. A fluid restriction of 1.5 L/day seems reasonable. improving    Follow CBC and electrolytes .     ___________________________________________________    Total time spent with patient:  [] 15   [] 25   [] 35   []  __ minutes    [] Critical Care Provided    Care Plan discussed with:    [x] Patient   [] Family    [] Care Manager   [] Consultant/Specialist :      []   >50% of visit spent in counseling and coordination of care   (Discussed [] CODE status,  [] Care Plan, [] D/C Planning)    ___________________________________________________    Attending Physician: MD Wisam Machuca

## 2023-03-12 NOTE — PROGRESS NOTES
Problem: Risk for Spread of Infection  Goal: Prevent transmission of infectious organism to others  Description: Prevent the transmission of infectious organisms to other patients, staff members, and visitors. Outcome: Progressing Towards Goal     Problem: Patient Education:  Go to Education Activity  Goal: Patient/Family Education  Outcome: Progressing Towards Goal     Problem: Falls - Risk of  Goal: *Absence of Falls  Description: Document Mona Nava Fall Risk and appropriate interventions in the flowsheet. Outcome: Progressing Towards Goal  Note: Fall Risk Interventions:                                Problem: Patient Education: Go to Patient Education Activity  Goal: Patient/Family Education  Outcome: Progressing Towards Goal     Problem: Pressure Injury - Risk of  Goal: *Prevention of pressure injury  Description: Document Quinn Scale and appropriate interventions in the flowsheet.   Outcome: Progressing Towards Goal  Note: Pressure Injury Interventions:  Sensory Interventions: Keep linens dry and wrinkle-free, Minimize linen layers    Moisture Interventions: Absorbent underpads, Internal/External urinary devices    Activity Interventions: Increase time out of bed, PT/OT evaluation    Mobility Interventions: PT/OT evaluation    Nutrition Interventions: Document food/fluid/supplement intake, Discuss nutritional consult with provider                     Problem: Patient Education: Go to Patient Education Activity  Goal: Patient/Family Education  Outcome: Progressing Towards Goal

## 2023-03-13 LAB
ALBUMIN SERPL-MCNC: 1.8 G/DL (ref 3.5–5)
ANION GAP SERPL CALC-SCNC: 7 MMOL/L (ref 5–15)
ATRIAL RATE: 69 BPM
ATRIAL RATE: 84 BPM
BUN SERPL-MCNC: 32 MG/DL (ref 6–20)
BUN/CREAT SERPL: 5 (ref 12–20)
CA-I BLD-MCNC: 6.7 MG/DL (ref 8.5–10.1)
CA-I BLD-MCNC: 7.1 MG/DL (ref 8.5–10.1)
CALCULATED P AXIS, ECG09: 41 DEGREES
CALCULATED P AXIS, ECG09: 43 DEGREES
CALCULATED R AXIS, ECG10: 18 DEGREES
CALCULATED R AXIS, ECG10: 35 DEGREES
CALCULATED T AXIS, ECG11: 48 DEGREES
CALCULATED T AXIS, ECG11: 61 DEGREES
CHLORIDE SERPL-SCNC: 100 MMOL/L (ref 97–108)
CO2 SERPL-SCNC: 25 MMOL/L (ref 21–32)
CREAT SERPL-MCNC: 6.71 MG/DL (ref 0.55–1.02)
CRP SERPL HS-MCNC: >9.5 MG/L
DIAGNOSIS, 93000: NORMAL
DIAGNOSIS, 93000: NORMAL
ERYTHROCYTE [DISTWIDTH] IN BLOOD BY AUTOMATED COUNT: 17.3 % (ref 11.5–14.5)
GLUCOSE BLD STRIP.AUTO-MCNC: 94 MG/DL (ref 65–100)
GLUCOSE SERPL-MCNC: 71 MG/DL (ref 65–100)
HCT VFR BLD AUTO: 27.4 % (ref 35–47)
HGB BLD-MCNC: 8.9 G/DL (ref 11.5–16)
MCH RBC QN AUTO: 30.5 PG (ref 26–34)
MCHC RBC AUTO-ENTMCNC: 32.5 G/DL (ref 30–36.5)
MCV RBC AUTO: 93.8 FL (ref 80–99)
NRBC # BLD: 0 K/UL (ref 0–0.01)
NRBC BLD-RTO: 0 PER 100 WBC
P-R INTERVAL, ECG05: 192 MS
P-R INTERVAL, ECG05: 214 MS
PERFORMED BY, TECHID: NORMAL
PHOSPHATE SERPL-MCNC: 2.8 MG/DL (ref 2.6–4.7)
PLATELET # BLD AUTO: 298 K/UL (ref 150–400)
PMV BLD AUTO: 10 FL (ref 8.9–12.9)
POTASSIUM SERPL-SCNC: 3.8 MMOL/L (ref 3.5–5.1)
PTH-INTACT SERPL-MCNC: 17.7 PG/ML (ref 18.4–88)
Q-T INTERVAL, ECG07: 456 MS
Q-T INTERVAL, ECG07: 482 MS
QRS DURATION, ECG06: 100 MS
QRS DURATION, ECG06: 90 MS
QTC CALCULATION (BEZET), ECG08: 516 MS
QTC CALCULATION (BEZET), ECG08: 538 MS
RBC # BLD AUTO: 2.92 M/UL (ref 3.8–5.2)
SODIUM SERPL-SCNC: 132 MMOL/L (ref 136–145)
VENTRICULAR RATE, ECG03: 69 BPM
VENTRICULAR RATE, ECG03: 84 BPM
WBC # BLD AUTO: 6.9 K/UL (ref 3.6–11)

## 2023-03-13 PROCEDURE — 36415 COLL VENOUS BLD VENIPUNCTURE: CPT

## 2023-03-13 PROCEDURE — 74011250636 HC RX REV CODE- 250/636: Performed by: INTERNAL MEDICINE

## 2023-03-13 PROCEDURE — 85027 COMPLETE CBC AUTOMATED: CPT

## 2023-03-13 PROCEDURE — 90935 HEMODIALYSIS ONE EVALUATION: CPT

## 2023-03-13 PROCEDURE — 74011250637 HC RX REV CODE- 250/637: Performed by: INTERNAL MEDICINE

## 2023-03-13 PROCEDURE — 94640 AIRWAY INHALATION TREATMENT: CPT

## 2023-03-13 PROCEDURE — 74011000250 HC RX REV CODE- 250: Performed by: INTERNAL MEDICINE

## 2023-03-13 PROCEDURE — 80069 RENAL FUNCTION PANEL: CPT

## 2023-03-13 PROCEDURE — 65270000029 HC RM PRIVATE

## 2023-03-13 PROCEDURE — 93005 ELECTROCARDIOGRAM TRACING: CPT

## 2023-03-13 PROCEDURE — 74011000258 HC RX REV CODE- 258: Performed by: INTERNAL MEDICINE

## 2023-03-13 PROCEDURE — 74011250637 HC RX REV CODE- 250/637: Performed by: HOSPITALIST

## 2023-03-13 RX ORDER — TRAZODONE HYDROCHLORIDE 50 MG/1
50 TABLET ORAL
Status: DISCONTINUED | OUTPATIENT
Start: 2023-03-13 | End: 2023-03-17 | Stop reason: HOSPADM

## 2023-03-13 RX ADMIN — EPOETIN ALFA-EPBX 8000 UNITS: 4000 INJECTION, SOLUTION INTRAVENOUS; SUBCUTANEOUS at 19:07

## 2023-03-13 RX ADMIN — PIPERACILLIN AND TAZOBACTAM 3.38 G: 3; .375 INJECTION, POWDER, FOR SOLUTION INTRAVENOUS at 21:09

## 2023-03-13 RX ADMIN — LEVOTHYROXINE SODIUM 100 MCG: 0.1 TABLET ORAL at 06:31

## 2023-03-13 RX ADMIN — SODIUM CHLORIDE, PRESERVATIVE FREE 10 ML: 5 INJECTION INTRAVENOUS at 05:17

## 2023-03-13 RX ADMIN — SODIUM CHLORIDE, PRESERVATIVE FREE 10 ML: 5 INJECTION INTRAVENOUS at 21:12

## 2023-03-13 RX ADMIN — MEGESTROL ACETATE 200 MG: 40 SUSPENSION ORAL at 13:32

## 2023-03-13 RX ADMIN — SODIUM CHLORIDE, PRESERVATIVE FREE 10 ML: 5 INJECTION INTRAVENOUS at 13:58

## 2023-03-13 RX ADMIN — FAMOTIDINE 20 MG: 20 TABLET ORAL at 13:32

## 2023-03-13 RX ADMIN — LOSARTAN POTASSIUM 50 MG: 50 TABLET, FILM COATED ORAL at 13:32

## 2023-03-13 RX ADMIN — ALUMINUM HYDROXIDE, MAGNESIUM HYDROXIDE, AND SIMETHICONE 10 ML: 200; 200; 20 SUSPENSION ORAL at 06:34

## 2023-03-13 RX ADMIN — NEPHROCAP 1 CAPSULE: 1 CAP ORAL at 13:32

## 2023-03-13 RX ADMIN — FLUTICASONE PROPIONATE 2 SPRAY: 50 SPRAY, METERED NASAL at 13:57

## 2023-03-13 RX ADMIN — PANTOPRAZOLE SODIUM 40 MG: 40 TABLET, DELAYED RELEASE ORAL at 06:31

## 2023-03-13 RX ADMIN — CARVEDILOL 25 MG: 12.5 TABLET, FILM COATED ORAL at 16:00

## 2023-03-13 RX ADMIN — ACETAMINOPHEN 650 MG: 325 TABLET ORAL at 16:00

## 2023-03-13 RX ADMIN — TRAZODONE HYDROCHLORIDE 50 MG: 50 TABLET ORAL at 21:09

## 2023-03-13 RX ADMIN — MONTELUKAST 10 MG: 10 TABLET, FILM COATED ORAL at 13:32

## 2023-03-13 RX ADMIN — PIPERACILLIN AND TAZOBACTAM 3.38 G: 3; .375 INJECTION, POWDER, FOR SOLUTION INTRAVENOUS at 06:28

## 2023-03-13 RX ADMIN — ACETAMINOPHEN 650 MG: 325 TABLET ORAL at 01:16

## 2023-03-13 RX ADMIN — BUDESONIDE AND FORMOTEROL FUMARATE DIHYDRATE 2 PUFF: 160; 4.5 AEROSOL RESPIRATORY (INHALATION) at 20:28

## 2023-03-13 NOTE — PROGRESS NOTES
Progress Note    Patient: Wayne Mackey MRN: [id number]  SSN: [id number]    YOB: 1955  Age: 74 y.o. Sex: female      Admit Date: 3/9/2023    LOS: 4 days     Subjective:     74 y.o. female with PMH of ESRD, failed renal transplant, arrhythmia (?atrial fibrillation), hypertension. She was admitted to [address] [place] [address],[address] with chief complaint of nausea, vomiting and  abdominal cramps, presumably due to missed hemodialysis. Otherwise limited history obtained from there as patient is withdrawn. While undergoing hemodialysis, patient had cardiac arrest V-tach and Qtc prolongation and intubated. She was subsequently extubated on 3/8. Patient is then transferred to [facility] per cardiology as she will need cardiac cath. On my evaluation, patient reports no active complaints. Specifically no chest pain, shortness of breath abdominal pain nausea and vomiting. CXR possibly shows infiltrates concerning for pneumonia. Await EP eval. Life vest was adjusted and will be available tomorrow before her discharge    Review of Systems:  Constitutional:  denies weight loss, no fever, no chills, no night sweats  Eye: No recent visual disturbances, no discharge, no double vision  Ear/nose/mouth/throat : No hearing disturbance, no ear pain, no nasal congestion, no sore throat, no trouble swallowing. Respiratory : No trouble breathing, no cough, no shortness of breath, no hemoptysis, no wheezing  Cardiovascular : No chest pain, no palpitation, no racing of heart, no orthopnea, no paroxysmal nocturnal dyspnea, no peripheral edema  Gastrointestinal : No nausea, no vomiting, no diarrhea, constipation, heartburn, abdominal pain  Genitourinary : No dysuria, no hematuria, no increased frequency, incontinence,  Lymphatics : No swollen glands -Neck, axillary, inguinal  Endocrine : No excessive thirst, no polyuria no cold intolerance, no heat intolerance.   Immunologic : No hives, urticaria, no seasonal allergies,   Musculoskeletal : Left upper back pain. No joint swelling, pain, effusion,  no neck pain,   Integumentary : No rash, no pruritus, no ecchymosis  Hematology : No petechiae, No easy bruising,  No tendency to bleed easy  Neurology : Denies change in mental status, no abnormal balance, no headache, no confusion, numbness, tingling,  Psychiatric : No mood swings, no anxiety, depression      Objective:     Vitals:    03/13/23 1200 03/13/23 1230 03/13/23 1528 03/13/23 1531   BP: 138/62 134/61 (!) 141/68 (!) 128/56   Pulse: 85 85  83   Resp: 12 16  18   Temp:    99.7 °F (37.6 °C)   TempSrc:       SpO2:    98%   Weight:       Height:            Intake and Output:  Current Shift: 03/13 0701 - 03/13 1900  In: -   Out: 3000   Last three shifts: No intake/output data recorded. Physical Exam:   General: alert, not cooperative, no distress  Eye: conjunctivae/corneas clear. PERRL, EOM's intact. Throat and Neck: normal and no erythema or exudates noted. No mass   Lung: clear to auscultation bilaterally  Heart: regular rate and rhythm,   Abdomen: soft, non-tender. Bowel sounds normal. No masses,  Extremities: No LE edema. able to move all extremities normal, atraumatic  Skin: Normal.  Neurologic: Motor function and sensation grossly intact.   Psychiatric: withdrawn         Lab/Data Review:  Recent Results (from the past 24 hour(s))   GLUCOSE, POC    Collection Time: 03/12/23  7:43 PM   Result Value Ref Range    Glucose (POC) 94 65 - 100 mg/dL    Performed by Cipriano Palacios    RENAL FUNCTION PANEL    Collection Time: 03/13/23  8:20 AM   Result Value Ref Range    Sodium 132 (L) 136 - 145 mmol/L    Potassium 3.8 3.5 - 5.1 mmol/L    Chloride 100 97 - 108 mmol/L    CO2 25 21 - 32 mmol/L    Anion gap 7 5 - 15 mmol/L    Glucose 71 65 - 100 mg/dL    BUN 32 (H) 6 - 20 mg/dL    Creatinine 6.71 (H) 0.55 - 1.02 mg/dL    BUN/Creatinine ratio 5 (L) 12 - 20      eGFR 6 (L) >60 ml/min/1.73m2    Calcium 7.1 (L) 8.5 - 10.1 mg/dL    Phosphorus 2.8 2.6 - 4.7 mg/dL Albumin 1.8 (L) 3.5 - 5.0 g/dL   CBC W/O DIFF    Collection Time: 03/13/23  3:02 PM   Result Value Ref Range    WBC 6.9 3.6 - 11.0 K/uL    RBC 2.92 (L) 3.80 - 5.20 M/uL    HGB 8.9 (L) 11.5 - 16.0 g/dL    HCT 27.4 (L) 35.0 - 47.0 %    MCV 93.8 80.0 - 99.0 FL    MCH 30.5 26.0 - 34.0 PG    MCHC 32.5 30.0 - 36.5 g/dL    RDW 17.3 (H) 11.5 - 14.5 %    PLATELET 014 145 - 249 K/uL    MPV 10.0 8.9 - 12.9 FL    NRBC 0.0 0.0  WBC    ABSOLUTE NRBC 0.00 0.00 - 0.01 K/uL         Assessment and plan:      (1) VT cardriac arrest: EP to see on Monday for ICD    (2) ESRD on HD: nephrology    (3) pAFIB: on coreg    (4) SIRS with probable aspiration : will continue zosyn. Repeat CRP and procal     (5) Anemia : complicates all aspects of care. Possibly AOCI. Holding AC. (6) HTN : resume home meds    (7) hyponatremia : stable    DVT ppx: SCDs    DISPO: Monday or Tuesday after EP evaluation. Life vest available on Tuesday.  Likely can go home with [facility]    Signed By: Yu Amezcua MD     March 13, 2023

## 2023-03-13 NOTE — DIALYSIS
Here for 4 h HD on 3k bath and 2000 ml net uf. Shiv Delgado in telemetry notified of location in dialysis and pt planned to be here 4 hours.

## 2023-03-13 NOTE — PROGRESS NOTES
Spoke to Tri the pharmacist on [facility] about giving the retacrit that the dialysis nurse did not give and he stated it is not supposed to be given on the floor only during dialysis when it is given IV.

## 2023-03-13 NOTE — PROGRESS NOTES
CARDIOLOGY PROGRESS NOTE     Patient seen and examined. This is a patient who is followed for VT/cardiac arrest and prolonged QTc. She was transferred from [facility] for cardiac cath to rule out CAD as etiology for ventricular arrhythmia. Cath negative for CAD showing only tortuous vessels. Seen on HD. No chest pain or dyspnea. No lightheadedness or palpitations. Some nausea, asking why she has to run on HD for 4 hours. No other complaints reported. Telemetry reviewed. Pertinent review of systems items noted above, all other systems are negative. Current medications reviewed. Physical Examination  Visit Vitals  BP (!) 146/61 (BP 1 Location: Right leg, BP Patient Position: At rest)   Pulse 88   Temp 98.3 °F (36.8 °C)   Resp 16   Ht 5' 0.98\" (1.549 m)   Wt 66.1 kg (145 lb 11.6 oz)   SpO2 98%   BMI 27.55 kg/m²       Vital signs are stable. No apparent distress. Heart is regular, rate and rhythm. Normal S1, S2, no murmurs are appreciated. Lungs are clear bilaterally. Abdomen is soft, nontender, normal bowel sounds. Extremities have no edema. Labs reviewed. Case discussed with Dr. Temo Amanda and our impression and recommendations are as follows:  1) VT cardiac arrest: Was transferred from outside hospital for cardiac catheterization. Troponins were low level. No chest pain. Cath negative. Continue carvedilol and losartan (per renal). She continues to have a long Qtc with serial EKGs. Repeat  ecg with qtc 516. Lytes were normal and at the time of Qtc prolonging up to the 700ms. She had been on amiodarone long term previously for atrial fibrillation management and remained on at the time of cardiac arrest.       Previously discussed an EP consultation with Dr. Hyman Dandy regarding placement of transvenous vs. subcutaneous ICD for secondary prevention. Prior to cardiac arrest/VT/VF on 3/6 she had last received IV Zofran on 3/5 at 1145. No other QT prolonging agents on board.   However, her home medication list per Epic includes several.  This will be addressed with her as well as consult for device as stated. Dr. Georgianne Nyhan consulted. Previously discussed potential ICD placement with the patient today for secondary prevention. 2) Acute blood loss anemia: Hgb remains in the 8s, down to 5.7 on 3/7. Most recently she has trended in the 8-9 range. CBC ordered today. DOAC discussed below. 3) ESRD on HD: Nephrology following     4) pAfib: Now in NSR. She had previously been followed by [facility] cardiology and was on long term amiodarone 200mg daily and Eliquis 2.5mg twice daily. Eliquis held following Hgb of 5.7. GI saw her at [facility] and had no plans for intervention. When safe to resume she should be on 5mg twice daily per dosing guidelines. No further amiodarone given prolonged Qtc. Please do not hesitate to call me or Dr. Tasha Cole if additional questions arise.

## 2023-03-13 NOTE — PROGRESS NOTES
[facility] [place] [facility]     Renal Daily Progress Note:     Admission Date: 3/9/2023     Subjective: Chart reviewed, patient examined. She was seen on dialysis. She was awake and alert. Discussed with cardiology, awaiting EP evaluation. Not short of breath, no chest pain. Able to eat. No nausea or vomiting. No lower extremity edema. Review of Systems  Pertinent items are noted in HPI.     Objective:     Visit Vitals  /61   Pulse 85   Temp 98.3 °F (36.8 °C)   Resp 16   Ht 5' 0.98\" (1.549 m)   Wt 66.1 kg (145 lb 11.6 oz)   SpO2 98%   BMI 27.55 kg/m²     Temp (24hrs), Av.4 °F (36.9 °C), Min:97.5 °F (36.4 °C), Max:99.7 °F (37.6 °C)        Intake/Output Summary (Last 24 hours) at 3/13/2023 1316  Last data filed at 3/13/2023 1230  Gross per 24 hour   Intake --   Output 3000 ml   Net -3000 ml     Current Facility-Administered Medications   Medication Dose Route Frequency    lidocaine 4 % patch 1 Patch  1 Patch TransDERmal Q24H    phenol throat spray (CHLORASEPTIC) 1 Spray  1 Spray Oral PRN    traMADoL (ULTRAM) tablet 25 mg  25 mg Oral Q6H PRN    piperacillin-tazobactam (ZOSYN) 3.375 g in 0.9% sodium chloride (MBP/ADV) 100 mL MBP  3.375 g IntraVENous Q12H    diphenhydrAMINE (BENADRYL) capsule 25 mg  25 mg Oral Q6H PRN    epoetin jalen-epbx (RETACRIT) injection 8,000 Units  8,000 Units IntraVENous Q MON, WED & FRI    B complex-vitaminC-folic acid (NEPHROCAP) cap  1 Capsule Oral DAILY    albuterol (PROVENTIL HFA, VENTOLIN HFA, PROAIR HFA) inhaler 1 Puff  1 Puff Inhalation Q4H PRN    alum-mag hydroxide-simeth (MYLANTA) oral suspension 10 mL  10 mL Oral Q6H PRN    calcitRIOL (ROCALTROL) capsule 0.5 mcg  0.5 mcg Oral Once per day on Sun Tue Wed Thu Sat    carvediloL (COREG) tablet 25 mg  25 mg Oral BID WITH MEALS    [Held by provider] cinacalcet (SENSIPAR) tablet 30 mg  30 mg Oral DAILY    famotidine (PEPCID) tablet 20 mg  20 mg Oral DAILY    fluticasone propionate (FLONASE) 50 mcg/actuation nasal spray 2 Spray  2 [place] Both Nostrils DAILY    losartan (COZAAR) tablet 50 mg  50 mg Oral DAILY    montelukast (SINGULAIR) tablet 10 mg  10 mg Oral DAILY    [Held by provider] sevelamer carbonate (RENVELA) tab 800 mg  800 mg Oral TID    budesonide-formoteroL (SYMBICORT) 160-4.5 mcg/actuation HFA inhaler 2 Puff  2 Puff Inhalation BID RT    And    tiotropium bromide (SPIRIVA RESPIMAT) 2.5 mcg /actuation  2 Puff Inhalation DAILY    sodium chloride (NS) flush 5-40 mL  5-40 mL IntraVENous Q8H    sodium chloride (NS) flush 5-40 mL  5-40 mL IntraVENous PRN    acetaminophen (TYLENOL) tablet 650 mg  650 mg Oral Q6H PRN    Or    acetaminophen (TYLENOL) suppository 650 mg  650 mg Rectal Q6H PRN    polyethylene glycol (MIRALAX) packet 17 g  17 g Oral DAILY PRN    dextrose 10% infusion 250 mL  250 mL IntraVENous PRN    levothyroxine (SYNTHROID) tablet 100 mcg  100 mcg Oral ACB    promethazine (PHENERGAN) 12.5 mg in 0.9% sodium chloride 50 mL IVPB  12.5 mg IntraVENous Q4H PRN    megestroL (MEGACE) 400 mg/10 mL (10 mL) oral suspension 200 mg  200 mg Oral DAILY    pantoprazole (PROTONIX) tablet 40 mg  40 mg Oral ACB       Physical Exam:Head: Normocephalic, without obvious abnormality, atraumatic  Neck: supple, symmetrical, trachea midline, no adenopathy, thyroid: not enlarged, symmetric, no tenderness/mass/nodules, and no JVD  Lungs: clear to auscultation bilaterally  Heart: regular rate and rhythm, no S3 or S4  Abdomen: soft, non-tender. Bowel sounds normal. No masses,  no organomegaly  Extremities: no edema  Neurologic: Grossly normal    Data Review:     LABS:  Recent Labs     03/13/23  0820 03/11/23  1642 03/11/23  1459   *  --  133*   K 3.8  --  4.1     --  99   CO2 25  --  28   BUN 32*  --  19   CREA 6.71*  --  4.37*   CA 7.1* 6.7* 6.5*   ALB 1.8*  --  1.9*   PHOS 2.8  --  1.9*     No results for input(s): WBC, HGB, HCT, PLT, HGBEXT, HCTEXT, PLTEXT in the last 72 hours.   No results for input(s): AKBAR, KU, CLU, CREAU in the last 72 hours.    No lab exists for component: PROU    Assessment:   Renal Specific Problems  CKD 6  cardiomyopathy  History of atrial fibrillation  History of cardiac arrest  Hypoalbuminemia    Plan:     Obtain/ Order: labs/cultures/radiology/procedures:  renal panel , magnesium      Therapeutic:    Seen on dialysis        Reanna Villeda MD    [phone]

## 2023-03-13 NOTE — PROGRESS NOTES
Problem: Risk for Spread of Infection  Goal: Prevent transmission of infectious organism to others  Description: Prevent the transmission of infectious organisms to other patients, staff members, and visitors. Outcome: Progressing Towards Goal     Problem: Patient Education:  Go to Education Activity  Goal: Patient/Family Education  Outcome: Progressing Towards Goal     Problem: Falls - Risk of  Goal: *Absence of Falls  Description: Document Sudheer Morgan Fall Risk and appropriate interventions in the flowsheet. Outcome: Progressing Towards Goal  Note: Fall Risk Interventions:                                Problem: Patient Education: Go to Patient Education Activity  Goal: Patient/Family Education  Outcome: Progressing Towards Goal     Problem: Pressure Injury - Risk of  Goal: *Prevention of pressure injury  Description: Document Quinn Scale and appropriate interventions in the flowsheet.   Outcome: Progressing Towards Goal  Note: Pressure Injury Interventions:  Sensory Interventions: Keep linens dry and wrinkle-free, Minimize linen layers    Moisture Interventions: Absorbent underpads    Activity Interventions: Increase time out of bed, PT/OT evaluation    Mobility Interventions: PT/OT evaluation    Nutrition Interventions: Document food/fluid/supplement intake, Discuss nutritional consult with provider                     Problem: Patient Education: Go to Patient Education Activity  Goal: Patient/Family Education  Outcome: Progressing Towards Goal

## 2023-03-13 NOTE — PROGRESS NOTES
Vascular Access Team Consult Follow-up: client has x-ray confirmed MIDLINE in [facility]. Line utilized is a 5 Fr double-lumen PICC trimmed to axilla length. Called and discussed with primary care nurse, Dario Pereyra. Encouraged writing \"MIDLINE' in all caps on dressing and hanging sign in room to let all staff that enter that client has Midline and not PICC, as written on catheter. Please re-consult PICC Team for any further vascular access needs.

## 2023-03-13 NOTE — PROGRESS NOTES
CM reviewed chart and spoke with primary physician. Patient's discharge disposition is home with home health services provided by [facility]. Discharge is pending cardiac clearance, with possible ICD placement. CM will continue to follow.

## 2023-03-13 NOTE — DIALYSIS
Retacrit ordered for HD but did not arrive while pt on HD. Unit nursing refusing to give/does not wish to give stating it can cause a drop in bp and had spoken with pharmacy. Planning on getting dose to to pt.

## 2023-03-14 LAB
ALBUMIN SERPL-MCNC: 1.8 G/DL (ref 3.5–5)
ANION GAP SERPL CALC-SCNC: 7 MMOL/L (ref 5–15)
BUN SERPL-MCNC: 15 MG/DL (ref 6–20)
BUN/CREAT SERPL: 3 (ref 12–20)
CA-I BLD-MCNC: 7.2 MG/DL (ref 8.5–10.1)
CHLORIDE SERPL-SCNC: 99 MMOL/L (ref 97–108)
CO2 SERPL-SCNC: 26 MMOL/L (ref 21–32)
CREAT SERPL-MCNC: 4.49 MG/DL (ref 0.55–1.02)
GLUCOSE BLD STRIP.AUTO-MCNC: 100 MG/DL (ref 65–100)
GLUCOSE SERPL-MCNC: 112 MG/DL (ref 65–100)
MAGNESIUM SERPL-MCNC: 2 MG/DL (ref 1.6–2.4)
PERFORMED BY, TECHID: NORMAL
PHOSPHATE SERPL-MCNC: 2.1 MG/DL (ref 2.6–4.7)
POTASSIUM SERPL-SCNC: 3.8 MMOL/L (ref 3.5–5.1)
SODIUM SERPL-SCNC: 132 MMOL/L (ref 136–145)

## 2023-03-14 PROCEDURE — 74011250637 HC RX REV CODE- 250/637: Performed by: INTERNAL MEDICINE

## 2023-03-14 PROCEDURE — 82962 GLUCOSE BLOOD TEST: CPT

## 2023-03-14 PROCEDURE — 94640 AIRWAY INHALATION TREATMENT: CPT

## 2023-03-14 PROCEDURE — 97161 PT EVAL LOW COMPLEX 20 MIN: CPT

## 2023-03-14 PROCEDURE — 94761 N-INVAS EAR/PLS OXIMETRY MLT: CPT

## 2023-03-14 PROCEDURE — 83735 ASSAY OF MAGNESIUM: CPT

## 2023-03-14 PROCEDURE — 97530 THERAPEUTIC ACTIVITIES: CPT

## 2023-03-14 PROCEDURE — 74011000250 HC RX REV CODE- 250: Performed by: INTERNAL MEDICINE

## 2023-03-14 PROCEDURE — 36415 COLL VENOUS BLD VENIPUNCTURE: CPT

## 2023-03-14 PROCEDURE — 74011250636 HC RX REV CODE- 250/636: Performed by: INTERNAL MEDICINE

## 2023-03-14 PROCEDURE — 97165 OT EVAL LOW COMPLEX 30 MIN: CPT

## 2023-03-14 PROCEDURE — 74011250637 HC RX REV CODE- 250/637: Performed by: HOSPITALIST

## 2023-03-14 PROCEDURE — 74011000258 HC RX REV CODE- 258: Performed by: INTERNAL MEDICINE

## 2023-03-14 PROCEDURE — 80069 RENAL FUNCTION PANEL: CPT

## 2023-03-14 PROCEDURE — 65270000029 HC RM PRIVATE

## 2023-03-14 RX ADMIN — CARVEDILOL 25 MG: 12.5 TABLET, FILM COATED ORAL at 16:25

## 2023-03-14 RX ADMIN — CARVEDILOL 25 MG: 12.5 TABLET, FILM COATED ORAL at 08:49

## 2023-03-14 RX ADMIN — ACETAMINOPHEN 650 MG: 325 TABLET ORAL at 05:56

## 2023-03-14 RX ADMIN — SODIUM CHLORIDE, PRESERVATIVE FREE 10 ML: 5 INJECTION INTRAVENOUS at 05:57

## 2023-03-14 RX ADMIN — PIPERACILLIN AND TAZOBACTAM 3.38 G: 3; .375 INJECTION, POWDER, FOR SOLUTION INTRAVENOUS at 20:44

## 2023-03-14 RX ADMIN — PROMETHAZINE HYDROCHLORIDE 12.5 MG: 25 INJECTION INTRAMUSCULAR; INTRAVENOUS at 00:51

## 2023-03-14 RX ADMIN — BUDESONIDE AND FORMOTEROL FUMARATE DIHYDRATE 2 PUFF: 160; 4.5 AEROSOL RESPIRATORY (INHALATION) at 08:46

## 2023-03-14 RX ADMIN — FAMOTIDINE 20 MG: 20 TABLET ORAL at 08:49

## 2023-03-14 RX ADMIN — PANTOPRAZOLE SODIUM 40 MG: 40 TABLET, DELAYED RELEASE ORAL at 08:50

## 2023-03-14 RX ADMIN — NEPHROCAP 1 CAPSULE: 1 CAP ORAL at 08:49

## 2023-03-14 RX ADMIN — TIOTROPIUM BROMIDE INHALATION SPRAY 2 PUFF: 3.12 SPRAY, METERED RESPIRATORY (INHALATION) at 08:46

## 2023-03-14 RX ADMIN — FLUTICASONE PROPIONATE 2 SPRAY: 50 SPRAY, METERED NASAL at 08:52

## 2023-03-14 RX ADMIN — TRAMADOL HYDROCHLORIDE 25 MG: 50 TABLET ORAL at 01:03

## 2023-03-14 RX ADMIN — TRAZODONE HYDROCHLORIDE 50 MG: 50 TABLET ORAL at 21:00

## 2023-03-14 RX ADMIN — LEVOTHYROXINE SODIUM 100 MCG: 0.1 TABLET ORAL at 08:50

## 2023-03-14 RX ADMIN — BUDESONIDE AND FORMOTEROL FUMARATE DIHYDRATE 2 PUFF: 160; 4.5 AEROSOL RESPIRATORY (INHALATION) at 19:29

## 2023-03-14 RX ADMIN — CALCITRIOL CAPSULES 0.25 MCG 0.5 MCG: 0.25 CAPSULE ORAL at 08:49

## 2023-03-14 RX ADMIN — PIPERACILLIN AND TAZOBACTAM 3.38 G: 3; .375 INJECTION, POWDER, FOR SOLUTION INTRAVENOUS at 08:49

## 2023-03-14 RX ADMIN — MONTELUKAST 10 MG: 10 TABLET, FILM COATED ORAL at 08:49

## 2023-03-14 RX ADMIN — SODIUM CHLORIDE, PRESERVATIVE FREE 10 ML: 5 INJECTION INTRAVENOUS at 13:37

## 2023-03-14 RX ADMIN — SODIUM CHLORIDE, PRESERVATIVE FREE 10 ML: 5 INJECTION INTRAVENOUS at 23:00

## 2023-03-14 RX ADMIN — MEGESTROL ACETATE 200 MG: 40 SUSPENSION ORAL at 08:49

## 2023-03-14 RX ADMIN — LOSARTAN POTASSIUM 50 MG: 50 TABLET, FILM COATED ORAL at 08:50

## 2023-03-14 NOTE — PROGRESS NOTES
OCCUPATIONAL THERAPY EVALUATION  Patient: Maricruz Lu (37 y.o. female)  Date: 3/14/2023  Primary Diagnosis: Ventricular tachyarrhythmia [I47.20]  Procedure(s) (LRB):  LEFT HEART CATH / CORONARY ANGIOGRAPHY (N/A) 4 Days Post-Op   Precautions: fall risk     In place during session: Peripheral IV and EKG/telemetry     ASSESSMENT  Pt is a 74 y.o. female presenting to [facility] from [place] s/p cardiac arrest, pt was intubated, and extubated on 3/12, admitted 3/9 and currently being treated for ventricular tachyarrhythmia. Per medical chart, pt was transferred to [facility] for cardiac cath; POD #4. Pt received semi-supine in bed upon arrival, AXO x4, and agreeable to OT/PT evaluations. Based on current observations, pt presents with deficits in generalized strength/AROM, bed mobility, static/dynamic sitting balance, static/dynamic standing balance (see PT note for gait details), functional activity tolerance, vision, sensation, coordination, cognition/confusion, decreased safety awareness, and pain currently impacting overall performance of ADLs and functional transfers/mobility (see below for objective details and assist levels). Overall, pt tolerates session fair with c/o discomfort in chest from chest compressions; reports discomfort worsens w/ coughing and movement. She req'd CGA for all functional mobility including bed mobility, bed<>BSC transfer, and ambulation within the room using RW; pt impulsive and brief LOB noted when attempting to turn around. Pt req'd rest breaks including seated and standing; presented w/ forward flexed posture and req'd cues to sit upright. OT educated pt on B shoulder retraction to help strengthen back and improve posture. Pt would benefit from continued skilled OT services to address current impairments and improve IND and safety with self cares and functional transfers/mobility. Current OT d/c recommendation SNF vs. [facility] pending progress once medically appropriate.  Pt declines placement at this time and reports she will fine going home. Other factors to consider for discharge: family/social support, DME, time since onset, severity of deficits, functional baseline     Patient will benefit from skilled therapy intervention to address the above noted impairments. PLAN :  Recommendations and Planned Interventions: self care training, functional mobility training, therapeutic exercise, balance training, therapeutic activities, endurance activities, patient education, and home safety training    Recommend with staff: Amb to bathroom for toileting with gt belt and AD    Recommend next session: LB dressing , LB bathing, and Standing grooming    Frequency/Duration: Patient will be followed by occupational therapy:  3-5x/week to address goals. Recommendation for discharge: (in order for the patient to meet his/her long term goals)  SNF vs. [facility] pending progress    This discharge recommendation:  Has been made in collaboration with the attending provider and/or case management    IF patient discharges home will need the following DME: TBD at next placement       SUBJECTIVE:   Patient stated I am ready to go home.     OBJECTIVE DATA SUMMARY:   HISTORY:   Past Medical History:   Diagnosis Date    JEFF (acute kidney injury) ([facility] 05/09/2019    Anemia NEC     Anxiety     Arrhythmia     Arthritis     Asthma     Asthma     Burning with urination     frequent uti    Calculus of gallbladder with acute cholecystitis without obstruction 10/09/2020    Cholecystitis 01/12/2019    Chronic kidney disease     dialysis    CMV (cytomegalovirus) antibody positive     Colitis 09/10/2022    COPD (chronic obstructive pulmonary disease) ([facility]     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    Dialysis patient [facility])     M/W/F    Diverticular disease of colon 08/21/2013    Dyspepsia and other specified disorders of function of stomach     stomach ulcer    Elevated troponin 10/21/2019    Encounter for cholecystectomy 05/06/2021 7/2020    Essential hypertension     GERD (gastroesophageal reflux disease)     History of chemotherapy     History of renal transplant 08/21/2013    (LD 2/12/2002)    HLD (hyperlipidemia)     Hypercholesteremia     Hypertension     Hypothyroidism 03/23/2022    Kidney transplant rejection     Menopause     Migraine     Obesity     Osteoporosis     Pancreatitis 08/28/2022    Pyelonephritis 07/13/2020    Recurrent urinary tract infection 09/27/2016    Renal cyst 2002    kidney transplant     Spontaneous bacterial peritonitis ([address] 01/16/2019    Ulcer      Past Surgical History:   Procedure Laterality Date    COLONOSCOPY      Dr Nicholas Hernandez  5yrs ago    ENDOSCOPY VISIT-OUTPATIENT      Dr Nicholas Hernandez  5 yrs ago    ENDOSCOPY, COLON, DIAGNOSTIC      with Dr. Félix Cheung CHOLECYSTECTOMY  02/2021    HX COLONOSCOPY  05/13/2022    HX GI      ulcer    HX HEENT      sinus surg    HX OTHER SURGICAL  02/21/2022    SIGMOIDOSCOPY, FLEXIBLE;  Surgeon: Hue Carey MD    HX RENAL TRANSPLANT      HX TRANSPLANT      kidney transplant 2002    [place] UNLISTED PROCEDURE VASCULAR SURGERY      shunt in prep for dialysis       Per pt:   Home Situation  Home Environment: Private residence  # Steps to Enter: 3  One/Two Story Residence: One story  Living Alone: No  Support Systems: Other Family Member(s)  Patient Expects to be Discharged to[de-identified] Home with one level  Current DME Used/Available at Home: None  Tub or Shower Type: Shower      EXAMINATION OF PERFORMANCE DEFICITS:  Cognitive/Behavioral Status:  Neurologic State: Alert  Orientation Level: Oriented X4  Cognition: Follows commands               Hearing: Auditory  Auditory Impairment: None      Range of Motion:  AROM: Within functional limits                         Strength:  Strength: Generally decreased, functional                Coordination:     Fine Motor Skills-Upper: Left Intact; Right Intact    Gross Motor Skills-Upper: Left Intact; Right Intact    Tone & Sensation: Sensation: Intact                      Balance:  Sitting: Intact; Without support  Standing: Impaired; With support  Standing - Static: Fair;Constant support  Standing - Dynamic : Fair;Constant support    Functional Mobility and Transfers for ADLs:  Bed Mobility:  Supine to Sit: Contact guard assistance; Additional time    Transfers:  Sit to Stand: Contact guard assistance  Stand to Sit: Contact guard assistance  Toilet Transfer : Contact guard assistance  Assistive Device : Walker, rolling      ADL Intervention and task modifications:       Grooming  Washing Hands: Set-up (sitting on commode)                        Toileting  Bowel Hygiene: Set-up (sitting on commode)         Therapeutic Exercise:  Pt will benefit from BUE HEP to improve participation in ADLs and mobility. Plan will be initiated at next session. Functional Measure:    [facility] AM-PACTM \"6 Clicks\"                                                       Daily Activity Inpatient Short Form  How much help from another person does the patient currently need. .. Total; A Lot A Little None   1. Putting on and taking off regular lower body clothing? []  1 []  2 [x]  3 []  4   2. Bathing (including washing, rinsing, drying)? []  1 []  2 [x]  3 []  4   3. Toileting, which includes using toilet, bedpan or urinal? [] 1 []  2 [x]  3 []  4   4. Putting on and taking off regular upper body clothing? []  1 []  2 [x]  3 []  4   5. Taking care of personal grooming such as brushing teeth? []  1 []  2 [x]  3 []  4   6. Eating meals? []  1 []  2 []  3 [x]  4   © 2007, Trustees of [facility], under license to [employer]. All rights reserved     Score: 19/24     Interpretation of Tool:  Represents clinically-significant functional categories (i.e. Activities of daily living).   Percentage of Impairment CH    0%   CI    1-19% CJ    20-39% CK    40-59% CL    60-79% CM    80-99% CN     100%   [facility]  Score 6-24 24 23 20-22 15-19 10-14 7-9 6 Occupational Therapy Evaluation Charge Determination   History Examination Decision-Making   LOW Complexity : Brief history review  LOW Complexity : 1-3 performance deficits relating to physical, cognitive , or psychosocial skils that result in activity limitations and / or participation restrictions  MEDIUM Complexity : Patient may present with comorbidities that affect occupational performnce. Miniml to moderate modification of tasks or assistance (eg, physical or verbal ) with assesment(s) is necessary to enable patient to complete evaluation       Based on the above components, the patient evaluation is determined to be of the following complexity level: LOW   Pain Rating:  See note above    Activity Tolerance:   Fair and requires rest breaks    After treatment patient left in no apparent distress:    Sitting in chair and Call bell within reach    COMMUNICATION/EDUCATION:   The patients plan of care was discussed with: Physical therapist and Registered nurse. Patient/family have participated as able in goal setting and plan of care. and Patient/family agree to work toward stated goals and plan of care. This patients plan of care is appropriate for delegation to DAREN. PT/OT sessions occurred together for increased safety of pt and clinician. Thank you for this referral.  Saulo Verde OT  Time Calculation: 25 mins   Problem: Self Care Deficits Care Plan (Adult)  Goal: *Acute Goals and Plan of Care (Insert Text)  Description: FUNCTIONAL STATUS PRIOR TO ADMISSION: Patient was independent and active without use of DME. Patient was independent for basic and instrumental ADLs.      HOME SUPPORT: The patient lived with sister but did not require assist.    Occupational Therapy Goals  Initiated 3/14/2023    Pt stated goal I want to go home  Pt will be IND sup <> sit in prep for EOB ADLs  Pt will be Mod I grooming standing sink side LRAD  Pt will be IND UB dressing sitting EOB/long sit   Pt will be IND LE dressing sitting EOB/long sit  Pt will be Mod I sit <>  prep for toileting LRAD  Pt will be Mod I toileting/toilet transfer/cloth mgmt LRAD  Pt will be IND following UE HEP in prep for self care tasks   Outcome: Not Met

## 2023-03-14 NOTE — PROGRESS NOTES
Problem: Risk for Spread of Infection  Goal: Prevent transmission of infectious organism to others  Description: Prevent the transmission of infectious organisms to other patients, staff members, and visitors. Outcome: Progressing Towards Goal     Problem: Patient Education:  Go to Education Activity  Goal: Patient/Family Education  Outcome: Progressing Towards Goal     Problem: Falls - Risk of  Goal: *Absence of Falls  Description: Document Kacey Pretty Fall Risk and appropriate interventions in the flowsheet. Outcome: Progressing Towards Goal  Note: Fall Risk Interventions:                                Problem: Patient Education: Go to Patient Education Activity  Goal: Patient/Family Education  Outcome: Progressing Towards Goal     Problem: Pressure Injury - Risk of  Goal: *Prevention of pressure injury  Description: Document Quinn Scale and appropriate interventions in the flowsheet.   Outcome: Progressing Towards Goal  Note: Pressure Injury Interventions:  Sensory Interventions: Keep linens dry and wrinkle-free, Minimize linen layers, Maintain/enhance activity level    Moisture Interventions: Absorbent underpads, Minimize layers    Activity Interventions: Increase time out of bed, PT/OT evaluation    Mobility Interventions: PT/OT evaluation    Nutrition Interventions: Document food/fluid/supplement intake                     Problem: Patient Education: Go to Patient Education Activity  Goal: Patient/Family Education  Outcome: Progressing Towards Goal

## 2023-03-14 NOTE — CONSULTS
PATIENT NAME: Mihaela Hoskins  YOB: 1955  AGE: 74 y.o. GENDER: female        REASON FOR CONSULT: Cardiac arrest     CHIEF COMPLAINT:  Nausea, vomiting     HISTORY OF PRESENT ILLNESS:  Mihaela Hoskins is a 74 y.o.  female with past medical history significant for atrial fibrillation, hypertension, ESRD on dialysis who was evaluated today due to recent cardiac arrest.    Records from hospital admission course thus far reviewed. Briefly patient was admitted initially on 3/1 with nausea, vomiting, abdominal pain which were deemed secondary to uremia secondary to missed dialysis session. She was also found to be AFL in RVR, started on IV amiodarone, she had been on PO amiodarone in the past. As she converted to sinus, she was restarted on oral amiodarone. Over the course of the next few days Qtc was noted to be prolonged. She had received IV zofran x 1 on 3/4 and 3/5. On 3/6 Qtc was progressively prolonging, so amiodarone and zofran were held, she had completed her dialysis. At night she had 2 runs of Tdp, one lasting 15 sec associated with loss of consciousness and another resulting in arrest requiring defibrillation with successful ROSC. She was intubated subsequently for airway protection, later extubated and transferred to [facility] for further cares. Of note post arrest electrolytes were noted to be within normal range. She had a negative cardiac cath and EP is consulted for consideration of ICD. Her ECG still shows prolonged QT interval despite having electrolyte repletion and off QT prolonging medications. Currently doing well, denies chest pain dyspnea. She has never had syncope or presyncope episodes in the past. She is yet to start therapy.       PAST MEDICAL HISTORY:       Past Medical History:   Diagnosis Date    JEFF (acute kidney injury) ([facility] 05/09/2019    Anemia NEC      Anxiety      Arrhythmia      Arthritis      Asthma      Asthma      Burning with urination       frequent uti    Calculus of gallbladder with acute cholecystitis without obstruction 10/09/2020    Cholecystitis 01/12/2019    Chronic kidney disease       dialysis    CMV (cytomegalovirus) antibody positive      Colitis 09/10/2022    COPD (chronic obstructive pulmonary disease) ([facility]      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    Dialysis patient [facility])       M/W/F    Diverticular disease of colon 08/21/2013    Dyspepsia and other specified disorders of function of stomach       stomach ulcer    Elevated troponin 10/21/2019    Encounter for cholecystectomy 05/06/2021 7/2020    Essential hypertension      GERD (gastroesophageal reflux disease)      History of chemotherapy      History of renal transplant 08/21/2013     (LD 2/12/2002)    HLD (hyperlipidemia)      Hypercholesteremia      Hypertension      Hypothyroidism 03/23/2022    Kidney transplant rejection      Menopause      Migraine      Obesity      Osteoporosis      Pancreatitis 08/28/2022    Pyelonephritis 07/13/2020    Recurrent urinary tract infection 09/27/2016    Renal cyst 2002     kidney transplant     Spontaneous bacterial peritonitis ([facility] 01/16/2019    Ulcer           INPATIENT MEDICATIONS:  Home medications reviewed.          Current Outpatient Medications   Medication Instructions    albuterol (PROVENTIL HFA, VENTOLIN HFA, PROAIR HFA) 90 mcg/actuation inhaler 1 Puff, Inhalation, EVERY 4 HOURS AS NEEDED    albuterol (PROVENTIL VENTOLIN) 2.5 mg /3 mL (0.083 %) nebu USE 1 VIAL VIA NEBULIZER THREE TIMES DAILY    aluminum & magnesium hydroxide-simethicone (Maalox Maximum Strength) 400-400-40 mg/5 mL suspension 10 mL, Oral, EVERY 6 HOURS AS NEEDED    amiodarone (CORDARONE) 200 mg tablet TAKE 1 TABLET EVERY MORNING    amLODIPine (NORVASC) 10 mg tablet TAKE 1 TABLET EVERY DAY    calcitRIOL (ROCALTROL) 0.5 mcg, Oral, SEE ADMIN INSTRUCTIONS, Take on non dialysis days (Tues, Wed, Thur, Sat, Sun)<BR>Dialysis is on Monday and Friday    carvediloL (COREG) 25 mg tablet TAKE 1 TABLET TWICE DAILY WITH MEALS    cinacalcet (SENSIPAR) 30 mg, Oral, DAILY    clopidogreL (PLAVIX) 75 mg tab TAKE 1 TABLET EVERY MORNING    cranberry extract 425 mg, Oral, DAILY    Dexilant 60 mg CpDB capsule (delayed release) TAKE 1 CAPSULE BY MOUTH IN THE MORNING. diclofenac (VOLTAREN) 1 % gel Topical, 4 TIMES DAILY    dicyclomine (BENTYL) 10 mg capsule TAKE 1 CAPSULE EVERY MORNING    doxercalciferoL (HECTOROL) 6 mcg, IntraVENous    Eliquis 2.5 mg tablet TAKE 1 TABLET TWICE DAILY    EPINEPHrine (EPIPEN) 0.3 mg/0.3 mL injection INJECT 0.3 ML INTRAMUSCULARLY ONCE AS NEEDED FOR ALLERGIC RESPONSE    epoetin jalen (EPOGEN;PROCRIT) 10,000 Units, SubCUTAneous    ESTRACE 0.01 % (0.1 mg/gram) vaginal cream INSERT 2 GRAMS INTO VAGINA EVERY MONDAY AND THURSDAY    famotidine (PEPCID) 20 mg, Oral, DAILY    fluticasone propionate (FLONASE) 50 mcg/actuation nasal spray USE 1 SPRAY IN EACH NOSTRIL EVERY MORNING    hyoscyamine SL (LEVSIN/SL) 0.125 mg, SubLINGual, EVERY 4 HOURS AS NEEDED    levothyroxine (SYNTHROID) 75 mcg tablet TAKE 1 TABLET EVERY DAY BEFORE BREAKFAST    losartan (COZAAR) 50 mg tablet TAKE 1 TABLET EVERY MORNING    meclizine (ANTIVERT) 25 mg tablet TAKE 1 TABLET THREE TIMES DAILY AS NEEDED FOR DIZZINESS    montelukast (SINGULAIR) 10 mg tablet TAKE 1 TABLET EVERY MORNING    ondansetron (ZOFRAN ODT) 8 mg, Oral, EVERY 8 HOURS AS NEEDED    polyethylene glycol (MIRALAX) 17 g, Oral, 2 TIMES DAILY    predniSONE (DELTASONE) 5 mg tablet 1 Tablet, Oral, DAILY    RenaPlex-D 800 mcg-12.5 mg -2,000 unit tab 1 Tablet, Oral, DAILY    sevelamer carbonate (RENVELA) 800 mg, Oral, 3 TIMES DAILY    simvastatin (ZOCOR) 20 mg tablet TAKE 1 TABLET EVERY DAY AS DIRECTED    sucralfate (CARAFATE) 1 gram tablet TAKE 1 TABLET FOUR TIMES DAILY    traZODone (DESYREL) 50 mg tablet TAKE 1 TABLET BY MOUTH EVERY NIGHT FOR INSOMNIA ASSOCIATED WITH DEPRESSION    Trelegy Ellipta 100-62.5-25 mcg inhaler INHALE 1 PUFF EVERY DAY.  PLEASE CALL AND SCHEDULE AN APPOINTMENT WITH NEW PCP FOR FUTURE REFILLS    valGANciclovir (VALCYTE) 450 mg tablet TAKE 2 TABLETS EVERY MORNING            ALLERGIES:  Allergies reviewed with the patient,        Allergies   Allergen Reactions    Aspirin Rash and Unknown (comments)    Bactrim [Sulfamethoxazole-Trimethoprim] Rash and Unknown (comments)    Bromfenac Rash and Unknown (comments)    Cefepime Other (comments)       Neurological reaction    Ceftriaxone Rash    Copper Rash    Ibuprofen Rash and Unknown (comments)    Ketorolac Tromethamine Rash and Unknown (comments)    Relafen [Nabumetone] Rash and Unknown (comments)    Rifampin Rash and Unknown (comments)    Vancomycin Rash and Unknown (comments)       Pt reports causes itching, takes benadryl prior to use. Pt denies anaphylaxis. Requires benadryl with each vancomycin dose    . FAMILY HISTORY:    No known family history of sudden cardiac death, syncope, arrhythmias, pacemakers, or ICDs. Family History   Problem Relation Age of Onset    Diabetes Mother      Cervical Cancer Mother      Arthritis-rheumatoid Mother      Hypertension Father      Diabetes Father      Thyroid Disease Sister      Colon Polyps Brother              SOCIAL HISTORY:       Social History           Tobacco Use    Smoking status: Never    Smokeless tobacco: Never   Vaping Use    Vaping Use: Never used   Substance Use Topics    Alcohol use: No    Drug use: No        REVIEW OF SYSTEMS:  Complete review of systems performed, pertinents noted above, all other systems are negative.         PHYSICAL EXAMINATION:    Visit Vitals  BP (!) 153/71   Pulse (!) 124   Temp 98.4 °F (36.9 °C) (Oral)   Resp 18   Ht 5' 1\" (1.549 m)   Wt 65.3 kg (144 lb)   SpO2 98%   BMI 27.21 kg/m²      General: awake, alert; appears to be of stated age; normal body habitus; non-toxic appearing; in no acute distress; cooperative and responding appropriately to questions; comfortable lying in hospital bed  HEENT: conjunctivae not injected; sclera anicteric; mucous membranes moist  Neck: supple; no JVD  Heart: regular rate and rhythm; normal S1 and S2 heart sounds; no murmurs/rubs/gallops or ejection clicks appreciated  Lungs: clear breath sounds bilaterally; no wheezing/rales/rhonchi or pleural rubs appreciated; unlabored effort of breathing  Abdomen: soft, non-tender, non-distended; active bowel sounds present; no hepatomegaly appreciated  Extremities: warm and well perfused x 4; no gross deformities; no pitting pedal/pretibial edema bilaterally; radial and pedal pulses 2+ bilaterally  Skin: normal skin color, texture, and turgor; no lesions or rashes, no petechiae or purpura; no cyanosis; no clubbing of the fingernails  Neurologic: no gross focal deficits          Recent labs results and imaging reviewed. Notable findings include         Lab Results   Component Value Date/Time     WBC 6.9 03/13/2023 03:02 PM     HGB 8.9 (L) 03/13/2023 03:02 PM     HCT 27.4 (L) 03/13/2023 03:02 PM     PLATELET 889 37/03/9439 03:02 PM     MCV 93.8 03/13/2023 03:02 PM            Lab Results   Component Value Date/Time     GFR est non-AA 6 (L) 09/12/2022 04:35 AM     GFR est AA 7 (L) 09/12/2022 04:35 AM     Creatinine 6.71 (H) 03/13/2023 08:20 AM     BUN 32 (H) 03/13/2023 08:20 AM     Sodium 132 (L) 03/13/2023 08:20 AM     Potassium 3.8 03/13/2023 08:20 AM     Chloride 100 03/13/2023 08:20 AM     CO2 25 03/13/2023 08:20 AM     Magnesium 2.4 02/10/2023 11:55 AM     Phosphorus 2.8 03/13/2023 08:20 AM     PTH, Intact 17.7 (L) 03/11/2023 04:42 PM    .      Telemetry review: Sinus  ECGs reviewed     IMPRESSION AND RECOMMENDATIONS:  Cardiac arrest secondary to Torsades de pointes with successful resuscitation  Atrial arrhythmia  Recent GI bleed  End stage renal disease, currently on dialysis  SIRS , possible aspiration  Anemia, acute on chronic     - I discussed in detail with patient, her daughter and son-in-law about her condition.  Specifically what cardiac arrest is and how it is managed. For Ms. Claudio Mark, there are no clear reversible causes though most likely nausea, vomiting, electrolyte shifts, medications as a combination played a role. In the absence of a clear reversible cause, an ICD should be considered. - Decision however should be carefully weighed against downsides to an ICD, risk of infection, inappropriate shocks (history of atrial arrhythmia). I discussed about transvenous vs subcutaneous device and relative risks associated though due to dialysis her risk is higher than others. - I additionally discussed that due to active suspected infection and treatment with IV antibiotics, I would not recommend implantation this admission. For that reason, would recommend a wearable defibrillator until such time she is clear from the infection and decides to go ahead with implantable defibrillator. Patient agrees to the same and will be set up for that. - She used to follow with [facility] but indicates she would like to follow with me going forward regarding her arrhythmia care. I will make arrangements for that. - Recommend no further QT prolonging agents for her care. Monitor and replete electrolytes aggressively. Thank you for involving us in the care of this patient. Please do not hesitate to call if additional questions arise.      Gardenia Jules MD  3/13/2023

## 2023-03-14 NOTE — PROGRESS NOTES
PHYSICAL THERAPY EVALUATION  Patient: Alan Holstein (6 y.o. female)  Date: 3/14/2023  Primary Diagnosis: Ventricular tachyarrhythmia [I47.20]  Procedure(s) (LRB):  LEFT HEART CATH / CORONARY ANGIOGRAPHY (N/A) 4 Days Post-Op   Precautions: Fall    In place during session: Peripheral IV and PICC line    ASSESSMENT  Pt is a 74 y.o. female admitted on 03/09/2023 for resp failure,was transferred from [address] [place] [address] and was intubated extubated 3/12 now on [facility] floor ; pt currently being treated for left heart card cath coronary angiography 4 days s/p . Pt semi supine  upon PT /OT arrival, agreeable to evaluation. Pt A&O x 4. Based on the objective data described, the patient presents with generalized weakness, impaired functional mobility, impaired amb, impaired balance, and decreased endurance. (See below for objective details and assist levels). Patient c/o chest pain due to the compressions. Patient also exhibits flexed,rounded shoulder  posture. educated to sit up taller and stretch a little to avoid permenant tightening of the anterior structures. Overall pt tolerated session fair/good today with therapy. Pt will benefit from continued skilled PT to address above deficits and return to PLOF. Current PT DC recommendation Jordi Story once medically appropriate. Current Level of Function Impacting Discharge (mobility/balance): patient with decreased endurance and needing 1 person to 2 person assist for safety. will not be safe unsupervised at this time    Other factors to consider for discharge:  recommended snf but patient wants to go home and reported she will do her exs self. She has all the ex programe.      PLAN :  Recommendations and Planned Interventions: bed mobility training, transfer training, gait training, therapeutic exercises, patient and family training/education, and therapeutic activities      Recommend for staff: Out of bed to chair for meals, Encourage HEP in prep for ADLs/mobility, Frequent repositioning to prevent skin breakdown, and Amb to bathroom for toileting with gt belt and AD    Frequency/Duration: Patient will be followed by physical therapy:  2-3x/week to address goals. Recommendation for discharge: (in order for the patient to meet his/her long term goals)  Jordi Story    This discharge recommendation:  Has been made in collaboration with the attending provider and/or case management    IF patient discharges home will need the following DME: patient owns DME required for discharge         SUBJECTIVE:   Patient stated I am weak.     OBJECTIVE DATA SUMMARY:   HISTORY:    Past Medical History:   Diagnosis Date    JEFF (acute kidney injury) ([facility] 05/09/2019    Anemia NEC     Anxiety     Arrhythmia     Arthritis     Asthma     Asthma     Burning with urination     frequent uti    Calculus of gallbladder with acute cholecystitis without obstruction 10/09/2020    Cholecystitis 01/12/2019    Chronic kidney disease     dialysis    CMV (cytomegalovirus) antibody positive     Colitis 09/10/2022    COPD (chronic obstructive pulmonary disease) ([facility]     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    Dialysis patient [facility])     M/W/F    Diverticular disease of colon 08/21/2013    Dyspepsia and other specified disorders of function of stomach     stomach ulcer    Elevated troponin 10/21/2019    Encounter for cholecystectomy 05/06/2021 7/2020    Essential hypertension     GERD (gastroesophageal reflux disease)     History of chemotherapy     History of renal transplant 08/21/2013    (LD 2/12/2002)    HLD (hyperlipidemia)     Hypercholesteremia     Hypertension     Hypothyroidism 03/23/2022    Kidney transplant rejection     Menopause     Migraine     Obesity     Osteoporosis     Pancreatitis 08/28/2022    Pyelonephritis 07/13/2020    Recurrent urinary tract infection 09/27/2016    Renal cyst 2002    kidney transplant     Spontaneous bacterial peritonitis ([facility] [address] 01/16/2019 Ulcer      Past Surgical History:   Procedure Laterality Date    COLONOSCOPY      Dr Fabrice West  5yrs ago    ENDOSCOPY VISIT-OUTPATIENT      Dr Fabrice West  5 yrs ago    ENDOSCOPY, COLON, DIAGNOSTIC      with Dr. Merly Padilla CHOLECYSTECTOMY  02/2021    HX COLONOSCOPY  05/13/2022    HX GI      ulcer    HX HEENT      sinus surg    HX OTHER SURGICAL  02/21/2022    SIGMOIDOSCOPY, FLEXIBLE;  Surgeon: Pippa Ortega MD    HX RENAL TRANSPLANT      HX TRANSPLANT      kidney transplant 2002    RI UNLISTED PROCEDURE VASCULAR SURGERY      shunt in prep for dialysis       Home Situation  Home Environment: Private residence  # Steps to Enter: 3  One/Two Story Residence: One story  Living Alone: No  Support Systems: Other Family Member(s)  Patient Expects to be Discharged to[de-identified] Home with one level  Current DME Used/Available at Home: None  Tub or Shower Type: Shower    EXAMINATION/PRESENTATION/DECISION MAKING:   Critical Behavior:  Neurologic State: Alert  Orientation Level: Oriented X4  Cognition: Follows commands     Hearing: Auditory  Auditory Impairment: None  Skin:    Edema:   Range Of Motion:  AROM: Within functional limits                       Strength:    Strength: Generally decreased, functional                    Tone & Sensation:                  Sensation: Intact               Coordination:     Vision:      Functional Mobility:  Bed Mobility:     Supine to Sit: Contact guard assistance        Transfers:  Sit to Stand: Contact guard assistance  Stand to Sit: Contact guard assistance                       Balance:   Sitting: Intact; Without support  Standing: Impaired; With support  Standing - Static: Fair;Constant support  Standing - Dynamic : Fair;Constant support  Ambulation/Gait Training:  Distance (ft): 25 Feet (ft)  Assistive Device: Gait belt;Walker, rolling  Ambulation - Level of Assistance: Stand-by assistance;Contact guard assistance     Gait Description (WDL): Exceptions to [facility] Therapeutic Exercises:   Reviewed AP, laq inchair    Functional Measure:  [facility] AM-PAC 6 Clicks         Basic Mobility Inpatient Short Form  How much difficulty does the patient currently have. .. Unable A Lot A Little None   1. Turning over in bed (including adjusting bedclothes, sheets and blankets)? [] 1   [] 2   [x] 3   [] 4   2. Sitting down on and standing up from a chair with arms ( e.g., wheelchair, bedside commode, etc.)   [] 1   [] 2   [x] 3   [] 4   3. Moving from lying on back to sitting on the side of the bed? [] 1   [] 2   [x] 3   [] 4          How much help from another person does the patient currently need. .. Total A Lot A Little None   4. Moving to and from a bed to a chair (including a wheelchair)? [] 1   [] 2   [x] 3   [] 4   5. Need to walk in hospital room? [] 1   [] 2   [x] 3   [] 4   6. Climbing 3-5 steps with a railing? [] 1   [] 2   [x] 3   [] 4   © , Trustees of [facility], under license to [employer]. All rights reserved     Score:  Initial:  Most Recent: X (Date: 2023 )   Interpretation of Tool:  Represents activities that are increasingly more difficult (i.e. Bed mobility, Transfers, Gait).   Score 24 23 22-20 19-15 14-10 9-7 6   Modifier CH CI CJ CK CL CM CN         Physical Therapy Evaluation Charge Determination   History Examination Presentation Decision-Making   LOW Complexity : Zero comorbidities / personal factors that will impact the outcome / POC LOW Complexity : 1-2 Standardized tests and measures addressing body structure, function, activity limitation and / or participation in recreation  LOW Complexity : Stable, uncomplicated  Other outcome measures [facility] 6        Based on the above components, the patient evaluation is determined to be of the following complexity level: LOW     Pain Ratin/10 chest with caughing    Activity Tolerance:   Fair    After treatment patient left in no apparent distress:   Bed locked and in lowest position Sitting in chair and Call bell within reach d. GOALS:    Problem: Mobility Impaired (Adult and Pediatric)  Goal: *Acute Goals and Plan of Care (Insert Text)  Description: FUNCTIONAL STATUS PRIOR TO ADMISSION: Patient required contact guard assistance for basic and instrumental ADLs. HOME SUPPORT PRIOR TO ADMISSION: The patient lived with sister and required minimal assistance/contact guard assist for mobility. Patient stated goal: Go Home  Patient will move from supine to sit and sit to supine , scoot up and down, and roll side to side in bed with supervision/set-up within 7 day(s). Patient will transfer from bed to chair and chair to bed with supervision/set-up using the least restrictive device within 7 day(s). Patient will improve static standing balance to supervision within 1 week(s). Patient will ambulate 50 feet with supervision with least restrictive device within 1 weeks. Outcome: Progressing Towards Goal       COMMUNICATION/EDUCATION:   The patients plan of care was discussed with: Occupational therapist, Registered nurse, and Case management. Fall prevention education was provided and the patient/caregiver indicated understanding., Patient/family have participated as able in goal setting and plan of care. , and Patient/family agree to work toward stated goals and plan of care.          Thank you for this referral.  Milvia Donnelly, PT   Time Calculation: 25 mins

## 2023-03-14 NOTE — PROGRESS NOTES
[facility]     Renal Daily Progress Note:     Admission Date: 3/9/2023     Subjective: Chart reviewed, patient examined. She was seen in room 476. She was awake and alert. Awaiting placement of defibrillator vest.  Not short of breath, no chest pain. Able to eat. No nausea or vomiting. No lower extremity edema. Review of Systems  Pertinent items are noted in HPI.     Objective:     Visit Vitals  BP (!) 112/54 (BP 1 Location: Left leg, BP Patient Position: Sitting)   Pulse 63   Temp 98.1 °F (36.7 °C)   Resp 16   Ht 5' 0.98\" (1.549 m)   Wt 65.1 kg (143 lb 8.3 oz)   SpO2 97%   BMI 27.13 kg/m²     Temp (24hrs), Av.9 °F (37.2 °C), Min:98.1 °F (36.7 °C), Max:99.7 °F (37.6 °C)      No intake or output data in the 24 hours ending 23 1245    Current Facility-Administered Medications   Medication Dose Route Frequency    traZODone (DESYREL) tablet 50 mg  50 mg Oral QHS    lidocaine 4 % patch 1 Patch  1 Patch TransDERmal Q24H    phenol throat spray (CHLORASEPTIC) 1 Spray  1 Spray Oral PRN    traMADoL (ULTRAM) tablet 25 mg  25 mg Oral Q6H PRN    piperacillin-tazobactam (ZOSYN) 3.375 g in 0.9% sodium chloride (MBP/ADV) 100 mL MBP  3.375 g IntraVENous Q12H    diphenhydrAMINE (BENADRYL) capsule 25 mg  25 mg Oral Q6H PRN    epoetin jalen-epbx (RETACRIT) injection 8,000 Units  8,000 Units IntraVENous Q MON, WED & FRI    B complex-vitaminC-folic acid (NEPHROCAP) cap  1 Capsule Oral DAILY    albuterol (PROVENTIL HFA, VENTOLIN HFA, PROAIR HFA) inhaler 1 Puff  1 Puff Inhalation Q4H PRN    alum-mag hydroxide-simeth (MYLANTA) oral suspension 10 mL  10 mL Oral Q6H PRN    calcitRIOL (ROCALTROL) capsule 0.5 mcg  0.5 mcg Oral Once per day on Sun Tue Wed Thu Sat    carvediloL (COREG) tablet 25 mg  25 mg Oral BID WITH MEALS    [Held by provider] cinacalcet (SENSIPAR) tablet 30 mg  30 mg Oral DAILY    famotidine (PEPCID) tablet 20 mg  20 mg Oral DAILY    fluticasone propionate (FLONASE) 50 mcg/actuation nasal spray 2 Spray  2 Spray Both Nostrils DAILY    losartan (COZAAR) tablet 50 mg  50 mg Oral DAILY    montelukast (SINGULAIR) tablet 10 mg  10 mg Oral DAILY    [Held by provider] sevelamer carbonate (RENVELA) tab 800 mg  800 mg Oral TID    budesonide-formoteroL (SYMBICORT) 160-4.5 mcg/actuation HFA inhaler 2 Puff  2 Puff Inhalation BID RT    And    tiotropium bromide (SPIRIVA RESPIMAT) 2.5 mcg /actuation  2 Puff Inhalation DAILY    sodium chloride (NS) flush 5-40 mL  5-40 mL IntraVENous Q8H    sodium chloride (NS) flush 5-40 mL  5-40 mL IntraVENous PRN    acetaminophen (TYLENOL) tablet 650 mg  650 mg Oral Q6H PRN    Or    acetaminophen (TYLENOL) suppository 650 mg  650 mg Rectal Q6H PRN    polyethylene glycol (MIRALAX) packet 17 g  17 g Oral DAILY PRN    dextrose 10% infusion 250 mL  250 mL IntraVENous PRN    levothyroxine (SYNTHROID) tablet 100 mcg  100 mcg Oral ACB    promethazine (PHENERGAN) 12.5 mg in 0.9% sodium chloride 50 mL IVPB  12.5 mg IntraVENous Q4H PRN    megestroL (MEGACE) 400 mg/10 mL (10 mL) oral suspension 200 mg  200 mg Oral DAILY    pantoprazole (PROTONIX) tablet 40 mg  40 mg Oral ACB       Physical Exam:Head: Normocephalic, without obvious abnormality, atraumatic  Neck: supple, symmetrical, trachea midline, no adenopathy, thyroid: not enlarged, symmetric, no tenderness/mass/nodules, and no JVD  Lungs: clear to auscultation bilaterally  Heart: regular rate and rhythm, no S3 or S4  Abdomen: soft, non-tender.  Bowel sounds normal. No masses,  no organomegaly  Extremities: no edema  Neurologic: Grossly normal    Data Review:     LABS:  Recent Labs     03/13/23  0820 03/11/23  1642 03/11/23  1459   *  --  133*   K 3.8  --  4.1     --  99   CO2 25  --  28   BUN 32*  --  19   CREA 6.71*  --  4.37*   CA 7.1* 6.7* 6.5*   ALB 1.8*  --  1.9*   PHOS 2.8  --  1.9*       Recent Labs     03/13/23  1502   WBC 6.9   HGB 8.9*   HCT 27.4*        No results for input(s): AKBAR, ALFREDITO, LORELEI, EMMA in the last 72 hours.    No lab exists for component: PROU    Assessment:   Renal Specific Problems  CKD 6  cardiomyopathy  History of atrial fibrillation  History of cardiac arrest  Hypoalbuminemia    Plan:     Obtain/ Order: labs/cultures/radiology/procedures:  renal panel , magnesium      Therapeutic:    Dialysis in a.m.  push Protein intake        Jordan Mehta MD    [phone]

## 2023-03-14 NOTE — PROGRESS NOTES
CARDIOLOGY PROGRESS NOTE     Patient seen and examined. This is a patient who is followed for VT/cardiac arrest and prolonged QTc. She was transferred from [address] [place] [address] for cardiac cath to rule out CAD as etiology for ventricular arrhythmia. Cath negative for CAD showing only tortuous vessels. Patient seen and examined. Up in the chair. No chest pain or dyspnea. No lightheadedness or palpitations. Awaiting wearable defibrillator to be fitted. No other complaints reported. Telemetry reviewed. Pertinent review of systems items noted above, all other systems are negative. Current medications reviewed. Physical Examination  Visit Vitals  BP (!) 143/70 (BP 1 Location: Right leg, BP Patient Position: At rest)   Pulse 75   Temp 98.7 °F (37.1 °C)   Resp 16   Ht 5' 0.98\" (1.549 m)   Wt 65.1 kg (143 lb 8.3 oz)   SpO2 99%   BMI 27.13 kg/m²       Vital signs are stable. No apparent distress. Heart is regular, rate and rhythm. Normal S1, S2, no murmurs are appreciated. Lungs are clear bilaterally. Abdomen is soft, nontender, normal bowel sounds. Extremities have no edema. Labs reviewed. Case discussed with Dr. Mary Ann Abbott and our impression and recommendations are as follows:  1) VT cardiac arrest: Sustained VT. Was transferred from outside hospital for cardiac catheterization. Troponins were low level. No chest pain. Cath negative. Continue carvedilol and losartan (per renal). Continue to monitor Qtc with serial EKGs. Repeat  ecg with qtc 500s. Lytes were normal and at the time of Qtc prolonging up to the 700ms. She had been on amiodarone long term previously for atrial fibrillation management and remained on at the time of cardiac arrest.  EP following, will consider ICD in the future given ongoing infection at present. Will fit with wearable defibrillator for prevention of SCD. 2) Acute blood loss anemia: Hgb remains in the 8s, down to 5.7 on 3/7.   Most recently she has trended in the 8-9 range.  DOAC discussed below. 3) ESRD on HD: Nephrology following     4) pAfib: Now in NSR. She had previously been followed by [facility] cardiology and was on long term amiodarone 200mg daily and Eliquis 2.5mg twice daily. Eliquis held following Hgb of 5.7. GI saw her at [facility] and had no plans for intervention. When safe to resume she should be on 5mg twice daily per dosing guidelines. No further amiodarone given prolonged Qtc. Please do not hesitate to call me or Dr. Sean Cai if additional questions arise.

## 2023-03-14 NOTE — PROGRESS NOTES
Received a call from Harriett, she is with [employer]. Harriett stated that Pt called her and told her that she will be leaving the hospital with a life vest.  Harriett stated that they cannot take care of Pt at their Dialysis center because they are not trained with the life vest.    Harriett stated that she is going to check around the different facility to inquire who can accept Pt with a life vest on.    Harriett asked that Pt not be D/C until she finds a facility that can accept Pt with a life vest.    Harriett, phone: [phone] if Harriett is not available ask for Adam Daily. [facility] in [place] has accepted Pt for [facility]. Called Pt daughter, discussed with her the situation with Pt getting a life vest and needing dialysis. CM will follow for D/C needs.

## 2023-03-14 NOTE — PROGRESS NOTES
Hospitalist Progress Note               Daily Progress Note: 3/14/2023      Subjective:   Hospital course to date:    Patient is a 73 yo female with a PMH of ESRD, failed renal transplant, HTN who was transferred from [facility] per cardiology on 3/9/23 due to necessary cardiac catheterization following cardiac arrest, V-tach, and QTc prolongation during dialysis. Patient was originally admitted to [facility] for nausea, vomiting and abdominal cramps possibly due to missed hemodialysis. Cardiac catheterization was completed on 3/10 and is negative for CAD showing only tortuous vessels. CXR from 3/10 shows infiltrates concerning for pneumonia. Patient completed dialysis on 3/10 and 6/08 without complications.      --------  Patient is seen today for follow-up. Patient complains of headache and nausea, which resolved with medication. Patient stated her temperature increased to 99.7 degrees last night. No acute complaints at this time. Labs today show sodium 132, BUN 32, creatinine 6.71. Repeat EKG shows prolonged Qtc with normal sinus rhythm.     Problem List:  Problem List as of 3/14/2023 Date Reviewed: 2/10/2023            Codes Class Noted - Resolved    Ventricular tachyarrhythmia ICD-10-CM: I47.20  ICD-9-CM: 427.1  3/9/2023 - Present        Hyperkalemia ICD-10-CM: E87.5  ICD-9-CM: 276.7  11/21/2022 - Present        Elevated troponin ICD-10-CM: R77.8  ICD-9-CM: 790.6  11/21/2022 - Present        Volume overload ICD-10-CM: E87.70  ICD-9-CM: 276.69  11/21/2022 - Present        ESRD needing dialysis [facility]) ICD-10-CM: N18.6, Z99.2  ICD-9-CM: 585.6  11/21/2022 - Present        Moderate protein-calorie malnutrition ([facility] ICD-10-CM: E44.0  ICD-9-CM: 263.0  9/11/2022 - Present        Stenosis of left vertebral artery ICD-10-CM: I65.02  ICD-9-CM: 433.20  4/12/2022 - Present        Atrial fibrillation ([facility] [address] ICD-10-CM: I48.91  ICD-9-CM: 427.31  5/6/2021 - Present        Chronic anticoagulation ICD-10-CM: Z79.01  ICD-9-CM: V58.61  5/6/2021 - Present        Stomatitis ICD-10-CM: K12.1  ICD-9-CM: 528.00  3/2/2021 - Present        ESRD (end stage renal disease) on dialysis [facility]) ICD-10-CM: N18.6, Z99.2  ICD-9-CM: 585.6, V45.11  12/28/2020 - Present        History of DVT (deep vein thrombosis) ICD-10-CM: Z86.718  ICD-9-CM: V12.51  12/28/2020 - Present        Hypothyroidism ICD-10-CM: E03.9  ICD-9-CM: 244.9  12/24/2020 - Present        Vertigo ICD-10-CM: R42  ICD-9-CM: 780.4  12/24/2020 - Present        GERD (gastroesophageal reflux disease) ICD-10-CM: K21.9  ICD-9-CM: 530.81  Unknown - Present        Diverticulitis of intestine ICD-10-CM: K57.92  ICD-9-CM: 562.11  4/2/2016 - Present        Cystic disease of kidney ICD-10-CM: Q61.9  ICD-9-CM: 753.10  3/16/2015 - Present        Anemia ICD-10-CM: D64.9  ICD-9-CM: 285.9  11/10/2014 - Present        Drug-induced hyperglycemia ICD-10-CM: R73.9, T50.905A  ICD-9-CM: 790.29, E947.9  6/28/2014 - Present        Kidney transplant rejection ICD-10-CM: T86.11  ICD-9-CM: 996.81  4/10/2014 - Present    Overview Signed 2/13/2017 11:58 AM by Karey Landeros     Overview:   Collected: Matheus Yepez Dr: Teresa Winchester MD    Case Number: [id number]  [address] [place] [address]    Case Number: [id number]    DIAGNOSIS:  ----------  ALLOGRAFT KIDNEY, NEEDLE BIOPSY (14 YEARS, 2 MONTHS):  - SUBOPTIMAL SPECIMEN: RENAL MEDULLA, INCLUDING DEEP MEDULLA WITH  FOCAL BENIGN UROTHELIAL EPITHELIUM. - MILD NEUTROPHILIC TUBULITIS WITH INTRALUMINAL NEUTROPHILS,  CORRELATE WITH URINE CULTURE TO RULE OUT BACTERIAL INFECTION. - MILD LYMPHOCYTIC TUBULITIS CONSISTENT WITH BORDERLINE CHANGE:  (\"SUSPICIOUS\" FOR ACUTE CELLULAR REJECTION) AS PER BANFF SCHEMA. - CONGESTED PERITUBULAR CAPILLARIES (NON-SPECIFIC), BUT RENAL  VEIN THROMBOSIS OR STENOSIS SHOULD BE EXCLUDED CLINICALLY.   - FOCAL SMALL INTERSTITIAL COLLECTION OF CAST MATERIAL  (NON-SPECIFIC), BUT  URINARY OBSTRUCTION SHOULD BE EXCLUDED CLINICALLY. - NO DEFINITIVE STAINING FOR POLYOMAVIRUSES (SEE DESCRIPTION). - FOCAL C4D REACTIVITY ALONG PERITUBULAR CAPILLARY WALLS WITH  HIGH NON-SPECIFIC BACKGROUND STAINING; SIGNIFICANCE UNCERTAIN AND  CORRELATION WITH FLOW PRA IS SUGGESTED TO EXCLUDE EARLY HUMORAL  REJECTION. - SMALL VESSEL SCLEROSIS, MILD TO MODERATE TO SEVERE.  - TUBULAR ATROPHY AND INTERSTITIAL FIBROSIS CANNOT BE ADEQUATELY  ASSESSED  IN THE ABSENCE OF RENAL CORTEX. Re-Bx - Collected: Laine Silva Dr: Myrtle Castillo MD    Case Number: [id number]  [address] [place] [address]    Case Number: [id number]    DIAGNOSIS:  ----------  ALLOGRAFT KIDNEY, NEEDLE BIOPSY (14 YEARS, 2 MONTHS):  - BORDERLINE CHANGE (\"SUSPICIOUS\" FOR ACUTE CELLULAR REJECTION)  TO FOCAL  MILD ACUTE CELLULAR REJECTION (BANFF TYPE 1A) (SEE COMMENT). - MILD NEUTROPHILIC INTERSTITIAL INFLAMMATION, CORRELATE WITH  URINE CULTURE  TO RULE OUT SUPERIMPOSED BACTERIAL INFECTION. - SMALL INTERSTITIAL COLLECTIONS OF PAS-POSITIVE CAST MATERIAL  AND CYSTICALLY DILATED PACKER'S SPACE WITH PAS-POSITIVE  PROTEINACEOUS FILTRATE (SEE  COMMENT). - ACUTE ISCHEMIC-TYPE TUBULAR INJURY IS NOTED.  - FOCAL C4D REACTIVITY ALONG PERITUBULAR CAPILLARY WALLS;  SIGNIFICANCE  UNCERTAIN AND CORRELATION WITH FLOW PRA IS SUGGESTED TO EXCLUDE  EARLY  HUMORAL REJECTION. - NEGATIVE IMMUNOSTAIN FOR POLYOMAVIRUSES (BK, HOMER). - CHRONIC CHANGES: GLOBAL SCLEROSIS IN APPROXIMATELY 14 OUT OF 60  (23%)  GLOMERULI, FOCAL SEGMENTAL GLOMERULOSCLEROSIS IN APPROXIMATELY  2 OUT OF  46 (4%) NON-OBSOLESCENT GLOMERULI, MODERATE TO SEVERE  ARTERIOSCLEROSIS,  MILD TO MODERATE TO SEVERE ARTERIOLAR HYALINE SCLEROSIS, AND  MODERATE  INTERSTITIAL FIBROSIS/TUBULAR ATROPHY (SEE COMMENT).              Asthma ICD-10-CM: J45.909  ICD-9-CM: 493.90  8/21/2013 - Present        Essential hypertension ICD-10-CM: I10  ICD-9-CM: 401.9  8/21/2013 - Present        Seasonal allergic rhinitis due to pollen ICD-10-CM: J30.1  ICD-9-CM: 477.0  8/21/2013 - Present        Hyperlipidemia ICD-10-CM: E78.5  ICD-9-CM: 272.4  8/21/2013 - Present        History of kidney transplant ICD-10-CM: Z94.0  ICD-9-CM: V42.0  4/30/2012 - Present        CMV (cytomegalovirus) antibody positive ICD-10-CM: R76.8  ICD-9-CM: 795.79  4/30/2012 - Present    Overview Signed 2/13/2017 11:58 AM by Velasquez LLANOS     Overview:   Donor negative             Migraine without status migrainosus, not intractable ICD-10-CM: D83.719  ICD-9-CM: 346.90  2/24/2010 - Present        RESOLVED: Acute colitis ICD-10-CM: K52.9  ICD-9-CM: 558.9  9/12/2022 - 11/3/2022        RESOLVED: Colitis ICD-10-CM: K52.9  ICD-9-CM: 558.9  9/10/2022 - 11/3/2022        RESOLVED: Intractable nausea and vomiting ICD-10-CM: R11.2  ICD-9-CM: 536.2  9/10/2022 - 11/3/2022        RESOLVED: Diverticulitis ICD-10-CM: M45.38  ICD-9-CM: 562.11  8/28/2022 - 11/3/2022        RESOLVED: Pancreatitis ICD-10-CM: K85.90  ICD-9-CM: 686.9  8/28/2022 - 11/3/2022        RESOLVED: Segmental colitis associated with diverticulosis ([facility] [address] ICD-10-CM: K50.10, K57.30  ICD-9-CM: 555.1, 562.10  7/19/2022 - 11/3/2022        RESOLVED: Nausea ICD-10-CM: R11.0  ICD-9-CM: 787.02  4/7/2022 - 11/3/2022        RESOLVED: Weakness ICD-10-CM: R53.1  ICD-9-CM: 780.79  4/7/2022 - 11/3/2022        RESOLVED: Fluid overload ICD-10-CM: E87.70  ICD-9-CM: 276.69  2/2/2022 - 11/3/2022        RESOLVED: Atypical chest pain ICD-10-CM: R07.89  ICD-9-CM: 786.59  12/24/2021 - 11/3/2022        RESOLVED: Mild persistent asthma with (acute) exacerbation ICD-10-CM: J45.31  ICD-9-CM: 493.92  12/24/2021 - 11/3/2022        RESOLVED: Intractable vomiting ICD-10-CM: R11.10  ICD-9-CM: 536.2  8/9/2021 - 11/3/2022        RESOLVED: Left leg pain ICD-10-CM: M79.605  ICD-9-CM: 729.5  7/14/2021 - 11/3/2022        RESOLVED: Acute hyperkalemia ICD-10-CM: E87.5  ICD-9-CM: 276.7  6/12/2021 - 11/3/2022        RESOLVED: Encounter for cholecystectomy ICD-10-CM: Z76.89  ICD-9-CM: V65.8  5/6/2021 - 11/3/2022    Overview Signed 5/6/2021  9:13 AM by Wale Martínez MD     7/2020             RESOLVED: Acute left-sided low back pain without sciatica ICD-10-CM: M54.50  ICD-9-CM: 724.2  5/6/2021 - 11/3/2022        RESOLVED: Chaim Adrianne of mouth and esophagus ([address] ICD-10-CM: B37.81, B37.0  ICD-9-CM: 112.84, 112.0  3/2/2021 - 11/3/2022        RESOLVED: Calculus of gallbladder with acute cholecystitis without obstruction ICD-10-CM: K80.00  ICD-9-CM: 574.00  10/9/2020 - 11/3/2022        RESOLVED: Acute recurrent maxillary sinusitis ICD-10-CM: J01.01  ICD-9-CM: 461.0  8/6/2020 - 11/3/2022        RESOLVED: Ulcer ICD-10-CM: YQT0278  ICD-9-CM: 707.9  Unknown - 11/3/2022        RESOLVED: Obesity ICD-10-CM: E66.9  ICD-9-CM: 278.00  Unknown - 11/3/2022        RESOLVED: Migraine ICD-10-CM: L27.337  ICD-9-CM: 346.90  Unknown - 11/3/2022        RESOLVED: Hypertension ICD-10-CM: I10  ICD-9-CM: 401.9  Unknown - 11/3/2022        RESOLVED: Burning with urination ICD-10-CM: R30.0  ICD-9-CM: 788.1  Unknown - 11/3/2022    Overview Signed 2/13/2017 12:02 PM by Dar LLANOS     frequent uti             RESOLVED: Arrhythmia ICD-10-CM: I49.9  ICD-9-CM: 427.9  Unknown - 11/3/2022        RESOLVED: Pruritus ICD-10-CM: L29.9  ICD-9-CM: 698.9  9/29/2016 - 11/3/2022        RESOLVED: Recurrent urinary tract infection ICD-10-CM: N39.0  ICD-9-CM: 599.0  9/27/2016 - 11/3/2022        RESOLVED: Nausea and vomiting ICD-10-CM: R11.2  ICD-9-CM: 787.01  4/10/2016 - 11/3/2022        RESOLVED: Gastritis and duodenitis ICD-10-CM: K29.90  ICD-9-CM: 535.50  2/23/2015 - 11/3/2022        RESOLVED: Disease due to gram-negative bacillus ICD-10-CM: A49.9  ICD-9-CM: 041.85  10/17/2014 - 11/3/2022    Overview Signed 2/13/2017 11:58 AM by Dar LLANOS     Overview:   Acinetobacter baumannii Septicemia and UTI 10/14/2014 (cultures at [facility])             RESOLVED: Fever ICD-10-CM: R50.9  ICD-9-CM: 780.60 10/14/2014 - 11/3/2022        RESOLVED: Exacerbation of asthma ICD-10-CM: J45.901  ICD-9-CM: 493.92  6/25/2014 - 11/3/2022        RESOLVED: Systemic inflammatory response syndrome (SIRS) (HCC) ICD-10-CM: R65.10  ICD-9-CM: 995.90  6/23/2014 - 11/3/2022        RESOLVED: Systemic infection ([facility] ICD-10-CM: A41.9  ICD-9-CM: 038.9  1/7/2014 - 11/3/2022        RESOLVED: Chronic obstructive pulmonary disease ([facility] ICD-10-CM: J44.9  ICD-9-CM: 496  8/21/2013 - 1/20/2022        RESOLVED: Diverticular disease of large intestine ICD-10-CM: K57.30  ICD-9-CM: 562.10  8/21/2013 - 11/3/2022        RESOLVED: Gastroesophageal reflux disease ICD-10-CM: K21.9  ICD-9-CM: 530.81  8/21/2013 - 8/3/2021        RESOLVED: UTI (urinary tract infection) ICD-10-CM: N39.0  ICD-9-CM: 599.0  8/21/2013 - 11/3/2022        RESOLVED: Fecal incontinence ICD-10-CM: R15.9  ICD-9-CM: 787.60  4/17/2013 - 11/3/2022        RESOLVED: Hematuria ICD-10-CM: R31.9  ICD-9-CM: 599.70  4/30/2012 - 11/3/2022        RESOLVED: Adiposity ICD-10-CM: E66.9  ICD-9-CM: 278.00  2/1/2010 - 8/3/2021       Medications reviewed  Current Facility-Administered Medications   Medication Dose Route Frequency    traZODone (DESYREL) tablet 50 mg  50 mg Oral QHS    lidocaine 4 % patch 1 Patch  1 Patch TransDERmal Q24H    phenol throat spray (CHLORASEPTIC) 1 Spray  1 Spray Oral PRN    traMADoL (ULTRAM) tablet 25 mg  25 mg Oral Q6H PRN    piperacillin-tazobactam (ZOSYN) 3.375 g in 0.9% sodium chloride (MBP/ADV) 100 mL MBP  3.375 g IntraVENous Q12H    diphenhydrAMINE (BENADRYL) capsule 25 mg  25 mg Oral Q6H PRN    epoetin jalen-epbx (RETACRIT) injection 8,000 Units  8,000 Units IntraVENous Q MON, WED & FRI    B complex-vitaminC-folic acid (NEPHROCAP) cap  1 Capsule Oral DAILY    albuterol (PROVENTIL HFA, VENTOLIN HFA, PROAIR HFA) inhaler 1 Puff  1 Puff Inhalation Q4H PRN    alum-mag hydroxide-simeth (MYLANTA) oral suspension 10 mL  10 mL Oral Q6H PRN    calcitRIOL (ROCALTROL) capsule 0.5 mcg  0.5 mcg Oral Once per day on Sun Tue Wed Thu Sat    carvediloL (COREG) tablet 25 mg  25 mg Oral BID WITH MEALS    [Held by provider] cinacalcet (SENSIPAR) tablet 30 mg  30 mg Oral DAILY    famotidine (PEPCID) tablet 20 mg  20 mg Oral DAILY    fluticasone propionate (FLONASE) 50 mcg/actuation nasal spray 2 Spray  2 Spray Both Nostrils DAILY    losartan (COZAAR) tablet 50 mg  50 mg Oral DAILY    montelukast (SINGULAIR) tablet 10 mg  10 mg Oral DAILY    [Held by provider] sevelamer carbonate (RENVELA) tab 800 mg  800 mg Oral TID    budesonide-formoteroL (SYMBICORT) 160-4.5 mcg/actuation HFA inhaler 2 Puff  2 Puff Inhalation BID RT    And    tiotropium bromide (SPIRIVA RESPIMAT) 2.5 mcg /actuation  2 Puff Inhalation DAILY    sodium chloride (NS) flush 5-40 mL  5-40 mL IntraVENous Q8H    sodium chloride (NS) flush 5-40 mL  5-40 mL IntraVENous PRN    acetaminophen (TYLENOL) tablet 650 mg  650 mg Oral Q6H PRN    Or    acetaminophen (TYLENOL) suppository 650 mg  650 mg Rectal Q6H PRN    polyethylene glycol (MIRALAX) packet 17 g  17 g Oral DAILY PRN    dextrose 10% infusion 250 mL  250 mL IntraVENous PRN    levothyroxine (SYNTHROID) tablet 100 mcg  100 mcg Oral ACB    promethazine (PHENERGAN) 12.5 mg in 0.9% sodium chloride 50 mL IVPB  12.5 mg IntraVENous Q4H PRN    megestroL (MEGACE) 400 mg/10 mL (10 mL) oral suspension 200 mg  200 mg Oral DAILY    pantoprazole (PROTONIX) tablet 40 mg  40 mg Oral ACB       Review of Systems:   A comprehensive review of systems was negative except for that written in the HPI. Objective:   Physical Exam:     Visit Vitals  /61 (BP 1 Location: Right leg, BP Patient Position: Semi fowlers; At rest)   Pulse 68   Temp 98.2 °F (36.8 °C)   Resp 18   Ht 5' 0.98\" (1.549 m)   Wt 65.1 kg (143 lb 8.3 oz)   SpO2 97%   BMI 27.13 kg/m²      O2 Device: None (Room air)    Temp (24hrs), Av.8 °F (37.1 °C), Min:98.1 °F (36.7 °C), Max:99.7 °F (37.6 °C)    No intake/output data recorded.     1901 - 03/14 0700  In: -   Out: 2000     General:   Awake and alert   Lungs:   Clear to auscultation bilaterally. Chest wall:  No tenderness or deformity. Heart:  Hypotensive. Regular rhythm, S1, S2 normal, no murmur, click, rub or gallop. Abdomen:   Soft, non-tender. Bowel sounds normal. No masses,  No organomegaly. Extremities: Extremities normal, atraumatic, no cyanosis or edema. Pulses: 2+ and symmetric all extremities. Skin: Skin color, texture, turgor normal. No rashes or lesions   Neurologic: CNII-XII intact. No gross focal deficits         Data Review:       Recent Days:  Recent Labs     03/13/23  1502   WBC 6.9   HGB 8.9*   HCT 27.4*          Recent Labs     03/13/23  0820 03/11/23  1642 03/11/23  1459   *  --  133*   K 3.8  --  4.1     --  99   CO2 25  --  28   GLU 71  --  79   BUN 32*  --  19   CREA 6.71*  --  4.37*   CA 7.1* 6.7* 6.5*   PHOS 2.8  --  1.9*   ALB 1.8*  --  1.9*       No results for input(s): PH, PCO2, PO2, HCO3, FIO2 in the last 72 hours. 24 Hour Results:  Recent Results (from the past 24 hour(s))   EKG, 12 LEAD, SUBSEQUENT    Collection Time: 03/13/23  3:24 PM   Result Value Ref Range    Ventricular Rate 84 BPM    Atrial Rate 84 BPM    P-R Interval 192 ms    QRS Duration 90 ms    Q-T Interval 456 ms    QTC Calculation (Bezet) 538 ms    Calculated P Axis 43 degrees    Calculated R Axis 18 degrees    Calculated T Axis 48 degrees    Diagnosis       Normal sinus rhythm  Prolonged QT  Abnormal ECG  When compared with ECG of 12-MAR-2023 16:07,  No significant change was found  Confirmed by Barry Wesley ([id number]) on 3/13/2023 4:27:13 PM     GLUCOSE, POC    Collection Time: 03/14/23  1:55 PM   Result Value Ref Range    Glucose (POC) 100 65 - 100 mg/dL    Performed by Yazan Bradshaw        XR HUMERUS RT   Final Result   1. Midline PICC terminates in the right axilla. XR CHEST PORT   Final Result   PICC line catheter is not demonstrated.       Interstitial and parenchymal opacities are not significantly changed. XR CHEST PORT   Final Result      A PICC is not identified. Mild diffuse interstitial opacities may represent   pulmonary edema or infection. Assessment:    Recent cardiac arrest secondary to Vtach    Qtc prolongation  -most likely due to medications, like amiodarone    ESRD  -currently undergoing hemodialysis    Paroxysmal atrial fibrillation    History of failed renal transplant    Hypothyroidism    Anemia of chronic kidney disease  -improving    Hyponatremia  -secondary to ESRD    Pneumonia  -possibly due to aspiration      Discussion/MDM: Patient with multiple medical comorbidities, each with high likelihood for morbidity and mortality if left untreated. I have reviewed patient's presenting subjective and objective findings, as well as all laboratory studies, imaging studies, and vital signs to date as well as treatment rendered and patient's response to those treatments. In addition, prior medical, surgical and relevant social and family histories were reviewed. I have discussed management plan with patient/family and with nursing staff.          Plan:  Repeat EKG to monitor Qtc intervals  Continue antibiotics for pneumonia  Continue carvedilol and losartan for paroxysmal afib  Continue hemodialysis schedule (M,W,F)  Continue home medications for HTN  Awaiting wearable defibrillator to be fitted    Likely discharge in 24 hours    Care Plan discussed with: Patient/Family    Code status: full code    Social determinants of health: none known    Disposition/barriers to discharge: continue inpatient care    Total time spent with patient:      Mari Horowitz MD

## 2023-03-14 NOTE — PROGRESS NOTES
Hospitalist Progress Note               Daily Progress Note: 3/14/2023      Subjective:   Hospital course to date:    Patient is a 73 yo female with a PMH of ESRD, failed renal transplant, HTN who was transferred from [facility] per cardiology on 3/9/23 due to necessary cardiac catheterization following cardiac arrest, V-tach, and QTc prolongation during dialysis. Patient was originally admitted to [facility] for nausea, vomiting and abdominal cramps possibly due to missed hemodialysis. Cardiac catheterization was completed on 3/10 and is negative for CAD showing only tortuous vessels. CXR from 3/10 shows infiltrates concerning for pneumonia. Patient completed dialysis on 3/10 and 6/02 without complications.      --------  Patient is seen today for follow-up. Patient complains of headache and nausea, which resolved with medication. Patient stated her temperature increased to 99.7 degrees last night. No acute complaints at this time. Labs today show sodium 132, BUN 32, creatinine 6.71. Repeat EKG shows prolonged Qtc with normal sinus rhythm.     Problem List:  Problem List as of 3/14/2023 Date Reviewed: 2/10/2023            Codes Class Noted - Resolved    Ventricular tachyarrhythmia ICD-10-CM: I47.20  ICD-9-CM: 427.1  3/9/2023 - Present        Hyperkalemia ICD-10-CM: E87.5  ICD-9-CM: 276.7  11/21/2022 - Present        Elevated troponin ICD-10-CM: R77.8  ICD-9-CM: 790.6  11/21/2022 - Present        Volume overload ICD-10-CM: E87.70  ICD-9-CM: 276.69  11/21/2022 - Present        ESRD needing dialysis [facility]) ICD-10-CM: N18.6, Z99.2  ICD-9-CM: 585.6  11/21/2022 - Present        Moderate protein-calorie malnutrition ([facility] ICD-10-CM: E44.0  ICD-9-CM: 263.0  9/11/2022 - Present        Stenosis of left vertebral artery ICD-10-CM: I65.02  ICD-9-CM: 433.20  4/12/2022 - Present        Atrial fibrillation ([facility] ICD-10-CM: I48.91  ICD-9-CM: 427.31  5/6/2021 - Present        Chronic anticoagulation ICD-10-CM: Z79.01  ICD-9-CM: V58.61  5/6/2021 - Present        Stomatitis ICD-10-CM: K12.1  ICD-9-CM: 528.00  3/2/2021 - Present        ESRD (end stage renal disease) on dialysis [facility]) ICD-10-CM: N18.6, Z99.2  ICD-9-CM: 585.6, V45.11  12/28/2020 - Present        History of DVT (deep vein thrombosis) ICD-10-CM: Z86.718  ICD-9-CM: V12.51  12/28/2020 - Present        Hypothyroidism ICD-10-CM: E03.9  ICD-9-CM: 244.9  12/24/2020 - Present        Vertigo ICD-10-CM: R42  ICD-9-CM: 780.4  12/24/2020 - Present        GERD (gastroesophageal reflux disease) ICD-10-CM: K21.9  ICD-9-CM: 530.81  Unknown - Present        Diverticulitis of intestine ICD-10-CM: K57.92  ICD-9-CM: 562.11  4/2/2016 - Present        Cystic disease of kidney ICD-10-CM: Q61.9  ICD-9-CM: 753.10  3/16/2015 - Present        Anemia ICD-10-CM: D64.9  ICD-9-CM: 285.9  11/10/2014 - Present        Drug-induced hyperglycemia ICD-10-CM: R73.9, T50.905A  ICD-9-CM: 790.29, E947.9  6/28/2014 - Present        Kidney transplant rejection ICD-10-CM: T86.11  ICD-9-CM: 996.81  4/10/2014 - Present    Overview Signed 2/13/2017 11:58 AM by Yarelis Montano     Overview:   Collected: Stacie Gomes Dr: Ruth Ann Calderon MD    Case Number: [id number]  [address] [place] [address]    Case Number: [id number]    DIAGNOSIS:  ----------  ALLOGRAFT KIDNEY, NEEDLE BIOPSY (14 YEARS, 2 MONTHS):  - SUBOPTIMAL SPECIMEN: RENAL MEDULLA, INCLUDING DEEP MEDULLA WITH  FOCAL BENIGN UROTHELIAL EPITHELIUM. - MILD NEUTROPHILIC TUBULITIS WITH INTRALUMINAL NEUTROPHILS,  CORRELATE WITH URINE CULTURE TO RULE OUT BACTERIAL INFECTION. - MILD LYMPHOCYTIC TUBULITIS CONSISTENT WITH BORDERLINE CHANGE:  (\"SUSPICIOUS\" FOR ACUTE CELLULAR REJECTION) AS PER BANFF SCHEMA. - CONGESTED PERITUBULAR CAPILLARIES (NON-SPECIFIC), BUT RENAL  VEIN THROMBOSIS OR STENOSIS SHOULD BE EXCLUDED CLINICALLY.   - FOCAL SMALL INTERSTITIAL COLLECTION OF CAST MATERIAL  (NON-SPECIFIC), BUT  URINARY OBSTRUCTION SHOULD BE EXCLUDED CLINICALLY. - NO DEFINITIVE STAINING FOR POLYOMAVIRUSES (SEE DESCRIPTION). - FOCAL C4D REACTIVITY ALONG PERITUBULAR CAPILLARY WALLS WITH  HIGH NON-SPECIFIC BACKGROUND STAINING; SIGNIFICANCE UNCERTAIN AND  CORRELATION WITH FLOW PRA IS SUGGESTED TO EXCLUDE EARLY HUMORAL  REJECTION. - SMALL VESSEL SCLEROSIS, MILD TO MODERATE TO SEVERE.  - TUBULAR ATROPHY AND INTERSTITIAL FIBROSIS CANNOT BE ADEQUATELY  ASSESSED  IN THE ABSENCE OF RENAL CORTEX. Re-Bx - Collected: Bill Esquivel Dr: Yo Alonzo MD    Case Number: [id number]  [address] [place] [address]    Case Number: [id number]    DIAGNOSIS:  ----------  ALLOGRAFT KIDNEY, NEEDLE BIOPSY (12 YEARS, 2 MONTHS):  - BORDERLINE CHANGE (\"SUSPICIOUS\" FOR ACUTE CELLULAR REJECTION)  TO FOCAL  MILD ACUTE CELLULAR REJECTION (BANFF TYPE 1A) (SEE COMMENT). - MILD NEUTROPHILIC INTERSTITIAL INFLAMMATION, CORRELATE WITH  URINE CULTURE  TO RULE OUT SUPERIMPOSED BACTERIAL INFECTION. - SMALL INTERSTITIAL COLLECTIONS OF PAS-POSITIVE CAST MATERIAL  AND CYSTICALLY DILATED PACKER'S SPACE WITH PAS-POSITIVE  PROTEINACEOUS FILTRATE (SEE  COMMENT). - ACUTE ISCHEMIC-TYPE TUBULAR INJURY IS NOTED.  - FOCAL C4D REACTIVITY ALONG PERITUBULAR CAPILLARY WALLS;  SIGNIFICANCE  UNCERTAIN AND CORRELATION WITH FLOW PRA IS SUGGESTED TO EXCLUDE  EARLY  HUMORAL REJECTION. - NEGATIVE IMMUNOSTAIN FOR POLYOMAVIRUSES (BK, HOMER). - CHRONIC CHANGES: GLOBAL SCLEROSIS IN APPROXIMATELY 14 OUT OF 60  (23%)  GLOMERULI, FOCAL SEGMENTAL GLOMERULOSCLEROSIS IN APPROXIMATELY  2 OUT OF  46 (4%) NON-OBSOLESCENT GLOMERULI, MODERATE TO SEVERE  ARTERIOSCLEROSIS,  MILD TO MODERATE TO SEVERE ARTERIOLAR HYALINE SCLEROSIS, AND  MODERATE  INTERSTITIAL FIBROSIS/TUBULAR ATROPHY (SEE COMMENT).              Asthma ICD-10-CM: J45.909  ICD-9-CM: 493.90  8/21/2013 - Present        Essential hypertension ICD-10-CM: I10  ICD-9-CM: 401.9  8/21/2013 - Present        Seasonal allergic rhinitis due to pollen ICD-10-CM: J30.1  ICD-9-CM: 477.0  8/21/2013 - Present        Hyperlipidemia ICD-10-CM: E78.5  ICD-9-CM: 272.4  8/21/2013 - Present        History of kidney transplant ICD-10-CM: Z94.0  ICD-9-CM: V42.0  4/30/2012 - Present        CMV (cytomegalovirus) antibody positive ICD-10-CM: R76.8  ICD-9-CM: 795.79  4/30/2012 - Present    Overview Signed 2/13/2017 11:58 AM by Felicity LLANOS     Overview:   Donor negative             Migraine without status migrainosus, not intractable ICD-10-CM: J41.685  ICD-9-CM: 346.90  2/24/2010 - Present        RESOLVED: Acute colitis ICD-10-CM: K52.9  ICD-9-CM: 558.9  9/12/2022 - 11/3/2022        RESOLVED: Colitis ICD-10-CM: K52.9  ICD-9-CM: 558.9  9/10/2022 - 11/3/2022        RESOLVED: Intractable nausea and vomiting ICD-10-CM: R11.2  ICD-9-CM: 536.2  9/10/2022 - 11/3/2022        RESOLVED: Diverticulitis ICD-10-CM: K22.36  ICD-9-CM: 562.11  8/28/2022 - 11/3/2022        RESOLVED: Pancreatitis ICD-10-CM: K85.90  ICD-9-CM: 431.6  8/28/2022 - 11/3/2022        RESOLVED: Segmental colitis associated with diverticulosis ([facility] ICD-10-CM: K50.10, K57.30  ICD-9-CM: 555.1, 562.10  7/19/2022 - 11/3/2022        RESOLVED: Nausea ICD-10-CM: R11.0  ICD-9-CM: 787.02  4/7/2022 - 11/3/2022        RESOLVED: Weakness ICD-10-CM: R53.1  ICD-9-CM: 780.79  4/7/2022 - 11/3/2022        RESOLVED: Fluid overload ICD-10-CM: E87.70  ICD-9-CM: 276.69  2/2/2022 - 11/3/2022        RESOLVED: Atypical chest pain ICD-10-CM: R07.89  ICD-9-CM: 786.59  12/24/2021 - 11/3/2022        RESOLVED: Mild persistent asthma with (acute) exacerbation ICD-10-CM: J45.31  ICD-9-CM: 493.92  12/24/2021 - 11/3/2022        RESOLVED: Intractable vomiting ICD-10-CM: R11.10  ICD-9-CM: 536.2  8/9/2021 - 11/3/2022        RESOLVED: Left leg pain ICD-10-CM: M79.605  ICD-9-CM: 729.5  7/14/2021 - 11/3/2022        RESOLVED: Acute hyperkalemia ICD-10-CM: E87.5  ICD-9-CM: 276.7  6/12/2021 - 11/3/2022        RESOLVED: Encounter for cholecystectomy ICD-10-CM: Z76.89  ICD-9-CM: V65.8  5/6/2021 - 11/3/2022    Overview Signed 5/6/2021  9:13 AM by Jaz Chapman MD     7/2020             RESOLVED: Acute left-sided low back pain without sciatica ICD-10-CM: M54.50  ICD-9-CM: 724.2  5/6/2021 - 11/3/2022        RESOLVED: Rina Hung of mouth and esophagus ([facility] ICD-10-CM: B37.81, B37.0  ICD-9-CM: 112.84, 112.0  3/2/2021 - 11/3/2022        RESOLVED: Calculus of gallbladder with acute cholecystitis without obstruction ICD-10-CM: K80.00  ICD-9-CM: 574.00  10/9/2020 - 11/3/2022        RESOLVED: Acute recurrent maxillary sinusitis ICD-10-CM: J01.01  ICD-9-CM: 461.0  8/6/2020 - 11/3/2022        RESOLVED: Ulcer ICD-10-CM: MNN9719  ICD-9-CM: 707.9  Unknown - 11/3/2022        RESOLVED: Obesity ICD-10-CM: E66.9  ICD-9-CM: 278.00  Unknown - 11/3/2022        RESOLVED: Migraine ICD-10-CM: T24.001  ICD-9-CM: 346.90  Unknown - 11/3/2022        RESOLVED: Hypertension ICD-10-CM: I10  ICD-9-CM: 401.9  Unknown - 11/3/2022        RESOLVED: Burning with urination ICD-10-CM: R30.0  ICD-9-CM: 788.1  Unknown - 11/3/2022    Overview Signed 2/13/2017 12:02 PM by Siria LLANOS     frequent uti             RESOLVED: Arrhythmia ICD-10-CM: I49.9  ICD-9-CM: 427.9  Unknown - 11/3/2022        RESOLVED: Pruritus ICD-10-CM: L29.9  ICD-9-CM: 698.9  9/29/2016 - 11/3/2022        RESOLVED: Recurrent urinary tract infection ICD-10-CM: N39.0  ICD-9-CM: 599.0  9/27/2016 - 11/3/2022        RESOLVED: Nausea and vomiting ICD-10-CM: R11.2  ICD-9-CM: 787.01  4/10/2016 - 11/3/2022        RESOLVED: Gastritis and duodenitis ICD-10-CM: K29.90  ICD-9-CM: 535.50  2/23/2015 - 11/3/2022        RESOLVED: Disease due to gram-negative bacillus ICD-10-CM: A49.9  ICD-9-CM: 041.85  10/17/2014 - 11/3/2022    Overview Signed 2/13/2017 11:58 AM by Siria LLANOS     Overview:   Acinetobacter baumannii Septicemia and UTI 10/14/2014 (cultures at [facility])             RESOLVED: Fever ICD-10-CM: R50.9  ICD-9-CM: 780.60 10/14/2014 - 11/3/2022        RESOLVED: Exacerbation of asthma ICD-10-CM: J45.901  ICD-9-CM: 493.92  6/25/2014 - 11/3/2022        RESOLVED: Systemic inflammatory response syndrome (SIRS) (HCC) ICD-10-CM: R65.10  ICD-9-CM: 995.90  6/23/2014 - 11/3/2022        RESOLVED: Systemic infection ([facility] ICD-10-CM: A41.9  ICD-9-CM: 038.9  1/7/2014 - 11/3/2022        RESOLVED: Chronic obstructive pulmonary disease ([facility] ICD-10-CM: J44.9  ICD-9-CM: 496  8/21/2013 - 1/20/2022        RESOLVED: Diverticular disease of large intestine ICD-10-CM: K57.30  ICD-9-CM: 562.10  8/21/2013 - 11/3/2022        RESOLVED: Gastroesophageal reflux disease ICD-10-CM: K21.9  ICD-9-CM: 530.81  8/21/2013 - 8/3/2021        RESOLVED: UTI (urinary tract infection) ICD-10-CM: N39.0  ICD-9-CM: 599.0  8/21/2013 - 11/3/2022        RESOLVED: Fecal incontinence ICD-10-CM: R15.9  ICD-9-CM: 787.60  4/17/2013 - 11/3/2022        RESOLVED: Hematuria ICD-10-CM: R31.9  ICD-9-CM: 599.70  4/30/2012 - 11/3/2022        RESOLVED: Adiposity ICD-10-CM: E66.9  ICD-9-CM: 278.00  2/1/2010 - 8/3/2021           Medications reviewed  Current Facility-Administered Medications   Medication Dose Route Frequency    traZODone (DESYREL) tablet 50 mg  50 mg Oral QHS    lidocaine 4 % patch 1 Patch  1 Patch TransDERmal Q24H    phenol throat spray (CHLORASEPTIC) 1 Spray  1 Spray Oral PRN    traMADoL (ULTRAM) tablet 25 mg  25 mg Oral Q6H PRN    piperacillin-tazobactam (ZOSYN) 3.375 g in 0.9% sodium chloride (MBP/ADV) 100 mL MBP  3.375 g IntraVENous Q12H    diphenhydrAMINE (BENADRYL) capsule 25 mg  25 mg Oral Q6H PRN    epoetin jalen-epbx (RETACRIT) injection 8,000 Units  8,000 Units IntraVENous Q MON, WED & FRI    B complex-vitaminC-folic acid (NEPHROCAP) cap  1 Capsule Oral DAILY    albuterol (PROVENTIL HFA, VENTOLIN HFA, PROAIR HFA) inhaler 1 Puff  1 Puff Inhalation Q4H PRN    alum-mag hydroxide-simeth (MYLANTA) oral suspension 10 mL  10 mL Oral Q6H PRN    calcitRIOL (ROCALTROL) capsule 0.5 mcg  0.5 mcg Oral Once per day on Sun Tue Wed Thu Sat    carvediloL (COREG) tablet 25 mg  25 mg Oral BID WITH MEALS    [Held by provider] cinacalcet (SENSIPAR) tablet 30 mg  30 mg Oral DAILY    famotidine (PEPCID) tablet 20 mg  20 mg Oral DAILY    fluticasone propionate (FLONASE) 50 mcg/actuation nasal spray 2 Spray  2 Spray Both Nostrils DAILY    losartan (COZAAR) tablet 50 mg  50 mg Oral DAILY    montelukast (SINGULAIR) tablet 10 mg  10 mg Oral DAILY    [Held by provider] sevelamer carbonate (RENVELA) tab 800 mg  800 mg Oral TID    budesonide-formoteroL (SYMBICORT) 160-4.5 mcg/actuation HFA inhaler 2 Puff  2 Puff Inhalation BID RT    And    tiotropium bromide (SPIRIVA RESPIMAT) 2.5 mcg /actuation  2 Puff Inhalation DAILY    sodium chloride (NS) flush 5-40 mL  5-40 mL IntraVENous Q8H    sodium chloride (NS) flush 5-40 mL  5-40 mL IntraVENous PRN    acetaminophen (TYLENOL) tablet 650 mg  650 mg Oral Q6H PRN    Or    acetaminophen (TYLENOL) suppository 650 mg  650 mg Rectal Q6H PRN    polyethylene glycol (MIRALAX) packet 17 g  17 g Oral DAILY PRN    dextrose 10% infusion 250 mL  250 mL IntraVENous PRN    levothyroxine (SYNTHROID) tablet 100 mcg  100 mcg Oral ACB    promethazine (PHENERGAN) 12.5 mg in 0.9% sodium chloride 50 mL IVPB  12.5 mg IntraVENous Q4H PRN    megestroL (MEGACE) 400 mg/10 mL (10 mL) oral suspension 200 mg  200 mg Oral DAILY    pantoprazole (PROTONIX) tablet 40 mg  40 mg Oral ACB       Review of Systems:   A comprehensive review of systems was negative except for that written in the HPI. Objective:   Physical Exam:     Visit Vitals  BP (!) 112/54 (BP 1 Location: Left leg, BP Patient Position: Sitting)   Pulse 63   Temp 98.1 °F (36.7 °C)   Resp 16   Ht 5' 0.98\" (1.549 m)   Wt 65.1 kg (143 lb 8.3 oz)   SpO2 97%   BMI 27.13 kg/m²      O2 Device: None (Room air)    Temp (24hrs), Av.9 °F (37.2 °C), Min:98.1 °F (36.7 °C), Max:99.7 °F (37.6 °C)    No intake/output data recorded.    1901 - 03/14 0700  In: -   Out: 2000     General:   Awake and alert   Lungs:   Clear to auscultation bilaterally. Chest wall:  No tenderness or deformity. Heart:  Hypotensive. Regular rhythm, S1, S2 normal, no murmur, click, rub or gallop. Abdomen:   Soft, non-tender. Bowel sounds normal. No masses,  No organomegaly. Extremities: Extremities normal, atraumatic, no cyanosis or edema. Pulses: 2+ and symmetric all extremities. Skin: Skin color, texture, turgor normal. No rashes or lesions   Neurologic: CNII-XII intact. No gross focal deficits         Data Review:       Recent Days:  Recent Labs     03/13/23  1502   WBC 6.9   HGB 8.9*   HCT 27.4*        Recent Labs     03/13/23  0820 03/11/23  1642 03/11/23  1459   *  --  133*   K 3.8  --  4.1     --  99   CO2 25  --  28   GLU 71  --  79   BUN 32*  --  19   CREA 6.71*  --  4.37*   CA 7.1* 6.7* 6.5*   PHOS 2.8  --  1.9*   ALB 1.8*  --  1.9*     No results for input(s): PH, PCO2, PO2, HCO3, FIO2 in the last 72 hours.     24 Hour Results:  Recent Results (from the past 24 hour(s))   CBC W/O DIFF    Collection Time: 03/13/23  3:02 PM   Result Value Ref Range    WBC 6.9 3.6 - 11.0 K/uL    RBC 2.92 (L) 3.80 - 5.20 M/uL    HGB 8.9 (L) 11.5 - 16.0 g/dL    HCT 27.4 (L) 35.0 - 47.0 %    MCV 93.8 80.0 - 99.0 FL    MCH 30.5 26.0 - 34.0 PG    MCHC 32.5 30.0 - 36.5 g/dL    RDW 17.3 (H) 11.5 - 14.5 %    PLATELET 354 778 - 714 K/uL    MPV 10.0 8.9 - 12.9 FL    NRBC 0.0 0.0  WBC    ABSOLUTE NRBC 0.00 0.00 - 0.01 K/uL   EKG, 12 LEAD, SUBSEQUENT    Collection Time: 03/13/23  3:24 PM   Result Value Ref Range    Ventricular Rate 84 BPM    Atrial Rate 84 BPM    P-R Interval 192 ms    QRS Duration 90 ms    Q-T Interval 456 ms    QTC Calculation (Bezet) 538 ms    Calculated P Axis 43 degrees    Calculated R Axis 18 degrees    Calculated T Axis 48 degrees    Diagnosis       Normal sinus rhythm  Prolonged QT  Abnormal ECG  When compared with ECG of 12-MAR-2023 16:07,  No significant change was found  Confirmed by Yobani Rodriguez ([id number]) on 3/13/2023 4:27:13 PM         XR HUMERUS RT   Final Result   1. Midline PICC terminates in the right axilla. XR CHEST PORT   Final Result   PICC line catheter is not demonstrated. Interstitial and parenchymal opacities are not significantly changed. XR CHEST PORT   Final Result      A PICC is not identified. Mild diffuse interstitial opacities may represent   pulmonary edema or infection. Assessment:    Recent cardiac arrest secondary to Vtach    Qtc prolongation  -most likely due to medications, like amiodarone    ESRD  -currently undergoing hemodialysis    Paroxysmal atrial fibrillation    History of failed renal transplant    Hypothyroidism    Anemia of chronic kidney disease  -improving    Hyponatremia  -secondary to ESRD    Pneumonia  -possibly due to aspiration      Discussion/MDM: Patient with multiple medical comorbidities, each with high likelihood for morbidity and mortality if left untreated. I have reviewed patient's presenting subjective and objective findings, as well as all laboratory studies, imaging studies, and vital signs to date as well as treatment rendered and patient's response to those treatments. In addition, prior medical, surgical and relevant social and family histories were reviewed. I have discussed management plan with patient/family and with nursing staff.          Plan:  Repeat EKG to monitor Qtc intervals  Continue antibiotics for pneumonia  Continue carvedilol and losartan for prophylactic afib  Continue hemodialysis schedule (M,W,F)  Continue home medications for HTN  Awaiting wearable defibrillator to be fitted      Care Plan discussed with: Patient/Family    Code status: full code    Social determinants of health: none known    Disposition/barriers to discharge: continue inpatient care    Total time spent with patient:      Brisa Chavez   *ATTENTION:  This note has been created by a medical student for educational purposes only. Please do not refer to the content of this note for clinical decision-making, billing, or other purposes. Please see attending physicians note to obtain clinical information on this patient. *

## 2023-03-14 NOTE — PROGRESS NOTES
Hospitalist Progress Note               Daily Progress Note: 3/14/2023      Subjective:   Hospital course to date:    Patient is a 75 yo female with a PMH of ESRD, failed renal transplant, HTN who was transferred from [facility] per cardiology on 3/9/23 due to necessary cardiac catheterization following cardiac arrest, V-tach, and QTc prolongation during dialysis. Patient was originally admitted to [facility] for nausea, vomiting and abdominal cramps possibly due to missed hemodialysis. Cardiac catheterization was completed on 3/10 and is negative for CAD showing only tortuous vessels. CXR from 3/10 shows infiltrates concerning for pneumonia. Patient completed dialysis on 3/10 and 5/93 without complications.      --------  Patient is seen today for follow-up. Patient complains of headache and nausea, which resolved with medication. Patient stated her temperature increased to 99.7 degrees last night. No acute complaints at this time. Labs today show sodium 132, BUN 32, creatinine 6.71. Repeat EKG shows prolonged Qtc with normal sinus rhythm.     Problem List:  Problem List as of 3/14/2023 Date Reviewed: 2/10/2023            Codes Class Noted - Resolved    Ventricular tachyarrhythmia ICD-10-CM: I47.20  ICD-9-CM: 427.1  3/9/2023 - Present        Hyperkalemia ICD-10-CM: E87.5  ICD-9-CM: 276.7  11/21/2022 - Present        Elevated troponin ICD-10-CM: R77.8  ICD-9-CM: 790.6  11/21/2022 - Present        Volume overload ICD-10-CM: E87.70  ICD-9-CM: 276.69  11/21/2022 - Present        ESRD needing dialysis [facility]) ICD-10-CM: N18.6, Z99.2  ICD-9-CM: 585.6  11/21/2022 - Present        Moderate protein-calorie malnutrition ([facility] ICD-10-CM: E44.0  ICD-9-CM: 263.0  9/11/2022 - Present        Stenosis of left vertebral artery ICD-10-CM: I65.02  ICD-9-CM: 433.20  4/12/2022 - Present        Atrial fibrillation ([facility] ICD-10-CM: I48.91  ICD-9-CM: 427.31  5/6/2021 - Present        Chronic anticoagulation ICD-10-CM: Z79.01  ICD-9-CM: V58.61  5/6/2021 - Present        Stomatitis ICD-10-CM: K12.1  ICD-9-CM: 528.00  3/2/2021 - Present        ESRD (end stage renal disease) on dialysis [facility]) ICD-10-CM: N18.6, Z99.2  ICD-9-CM: 585.6, V45.11  12/28/2020 - Present        History of DVT (deep vein thrombosis) ICD-10-CM: Z86.718  ICD-9-CM: V12.51  12/28/2020 - Present        Hypothyroidism ICD-10-CM: E03.9  ICD-9-CM: 244.9  12/24/2020 - Present        Vertigo ICD-10-CM: R42  ICD-9-CM: 780.4  12/24/2020 - Present        GERD (gastroesophageal reflux disease) ICD-10-CM: K21.9  ICD-9-CM: 530.81  Unknown - Present        Diverticulitis of intestine ICD-10-CM: K57.92  ICD-9-CM: 562.11  4/2/2016 - Present        Cystic disease of kidney ICD-10-CM: Q61.9  ICD-9-CM: 753.10  3/16/2015 - Present        Anemia ICD-10-CM: D64.9  ICD-9-CM: 285.9  11/10/2014 - Present        Drug-induced hyperglycemia ICD-10-CM: R73.9, T50.905A  ICD-9-CM: 790.29, E947.9  6/28/2014 - Present        Kidney transplant rejection ICD-10-CM: T86.11  ICD-9-CM: 996.81  4/10/2014 - Present    Overview Signed 2/13/2017 11:58 AM by Monica Gold     Overview:   Collected: Marbin Thurston Dr: Cristin Sapp MD    Case Number: [id number]  [address] [place] [address]    Case Number: [id number]    DIAGNOSIS:  ----------  ALLOGRAFT KIDNEY, NEEDLE BIOPSY (14 YEARS, 2 MONTHS):  - SUBOPTIMAL SPECIMEN: RENAL MEDULLA, INCLUDING DEEP MEDULLA WITH  FOCAL BENIGN UROTHELIAL EPITHELIUM. - MILD NEUTROPHILIC TUBULITIS WITH INTRALUMINAL NEUTROPHILS,  CORRELATE WITH URINE CULTURE TO RULE OUT BACTERIAL INFECTION. - MILD LYMPHOCYTIC TUBULITIS CONSISTENT WITH BORDERLINE CHANGE:  (\"SUSPICIOUS\" FOR ACUTE CELLULAR REJECTION) AS PER BANFF SCHEMA. - CONGESTED PERITUBULAR CAPILLARIES (NON-SPECIFIC), BUT RENAL  VEIN THROMBOSIS OR STENOSIS SHOULD BE EXCLUDED CLINICALLY.   - FOCAL SMALL INTERSTITIAL COLLECTION OF CAST MATERIAL  (NON-SPECIFIC), BUT  URINARY OBSTRUCTION SHOULD BE EXCLUDED CLINICALLY. - NO DEFINITIVE STAINING FOR POLYOMAVIRUSES (SEE DESCRIPTION). - FOCAL C4D REACTIVITY ALONG PERITUBULAR CAPILLARY WALLS WITH  HIGH NON-SPECIFIC BACKGROUND STAINING; SIGNIFICANCE UNCERTAIN AND  CORRELATION WITH FLOW PRA IS SUGGESTED TO EXCLUDE EARLY HUMORAL  REJECTION. - SMALL VESSEL SCLEROSIS, MILD TO MODERATE TO SEVERE.  - TUBULAR ATROPHY AND INTERSTITIAL FIBROSIS CANNOT BE ADEQUATELY  ASSESSED  IN THE ABSENCE OF RENAL CORTEX. Re-Bx - Collected: Maral eNwton Dr: Marget Kocher MD    Case Number: [id number]  [address] [place] [address]    Case Number: [id number]    DIAGNOSIS:  ----------  ALLOGRAFT KIDNEY, NEEDLE BIOPSY (12 YEARS, 2 MONTHS):  - BORDERLINE CHANGE (\"SUSPICIOUS\" FOR ACUTE CELLULAR REJECTION)  TO FOCAL  MILD ACUTE CELLULAR REJECTION (BANFF TYPE 1A) (SEE COMMENT). - MILD NEUTROPHILIC INTERSTITIAL INFLAMMATION, CORRELATE WITH  URINE CULTURE  TO RULE OUT SUPERIMPOSED BACTERIAL INFECTION. - SMALL INTERSTITIAL COLLECTIONS OF PAS-POSITIVE CAST MATERIAL  AND CYSTICALLY DILATED PACKER'S SPACE WITH PAS-POSITIVE  PROTEINACEOUS FILTRATE (SEE  COMMENT). - ACUTE ISCHEMIC-TYPE TUBULAR INJURY IS NOTED.  - FOCAL C4D REACTIVITY ALONG PERITUBULAR CAPILLARY WALLS;  SIGNIFICANCE  UNCERTAIN AND CORRELATION WITH FLOW PRA IS SUGGESTED TO EXCLUDE  EARLY  HUMORAL REJECTION. - NEGATIVE IMMUNOSTAIN FOR POLYOMAVIRUSES (BK, HOMER). - CHRONIC CHANGES: GLOBAL SCLEROSIS IN APPROXIMATELY 14 OUT OF 60  (23%)  GLOMERULI, FOCAL SEGMENTAL GLOMERULOSCLEROSIS IN APPROXIMATELY  2 OUT OF  46 (4%) NON-OBSOLESCENT GLOMERULI, MODERATE TO SEVERE  ARTERIOSCLEROSIS,  MILD TO MODERATE TO SEVERE ARTERIOLAR HYALINE SCLEROSIS, AND  MODERATE  INTERSTITIAL FIBROSIS/TUBULAR ATROPHY (SEE COMMENT).              Asthma ICD-10-CM: J45.909  ICD-9-CM: 493.90  8/21/2013 - Present        Essential hypertension ICD-10-CM: I10  ICD-9-CM: 401.9  8/21/2013 - Present        Seasonal allergic rhinitis due to pollen ICD-10-CM: J30.1  ICD-9-CM: 477.0  8/21/2013 - Present        Hyperlipidemia ICD-10-CM: E78.5  ICD-9-CM: 272.4  8/21/2013 - Present        History of kidney transplant ICD-10-CM: Z94.0  ICD-9-CM: V42.0  4/30/2012 - Present        CMV (cytomegalovirus) antibody positive ICD-10-CM: R76.8  ICD-9-CM: 795.79  4/30/2012 - Present    Overview Signed 2/13/2017 11:58 AM by Riya LLANOS     Overview:   Donor negative             Migraine without status migrainosus, not intractable ICD-10-CM: J60.026  ICD-9-CM: 346.90  2/24/2010 - Present        RESOLVED: Acute colitis ICD-10-CM: K52.9  ICD-9-CM: 558.9  9/12/2022 - 11/3/2022        RESOLVED: Colitis ICD-10-CM: K52.9  ICD-9-CM: 558.9  9/10/2022 - 11/3/2022        RESOLVED: Intractable nausea and vomiting ICD-10-CM: R11.2  ICD-9-CM: 536.2  9/10/2022 - 11/3/2022        RESOLVED: Diverticulitis ICD-10-CM: H25.73  ICD-9-CM: 562.11  8/28/2022 - 11/3/2022        RESOLVED: Pancreatitis ICD-10-CM: K85.90  ICD-9-CM: 048.8  8/28/2022 - 11/3/2022        RESOLVED: Segmental colitis associated with diverticulosis ([facility] ICD-10-CM: K50.10, K57.30  ICD-9-CM: 555.1, 562.10  7/19/2022 - 11/3/2022        RESOLVED: Nausea ICD-10-CM: R11.0  ICD-9-CM: 787.02  4/7/2022 - 11/3/2022        RESOLVED: Weakness ICD-10-CM: R53.1  ICD-9-CM: 780.79  4/7/2022 - 11/3/2022        RESOLVED: Fluid overload ICD-10-CM: E87.70  ICD-9-CM: 276.69  2/2/2022 - 11/3/2022        RESOLVED: Atypical chest pain ICD-10-CM: R07.89  ICD-9-CM: 786.59  12/24/2021 - 11/3/2022        RESOLVED: Mild persistent asthma with (acute) exacerbation ICD-10-CM: J45.31  ICD-9-CM: 493.92  12/24/2021 - 11/3/2022        RESOLVED: Intractable vomiting ICD-10-CM: R11.10  ICD-9-CM: 536.2  8/9/2021 - 11/3/2022        RESOLVED: Left leg pain ICD-10-CM: M79.605  ICD-9-CM: 729.5  7/14/2021 - 11/3/2022        RESOLVED: Acute hyperkalemia ICD-10-CM: E87.5  ICD-9-CM: 276.7  6/12/2021 - 11/3/2022        RESOLVED: Encounter for cholecystectomy ICD-10-CM: Z76.89  ICD-9-CM: V65.8  5/6/2021 - 11/3/2022    Overview Signed 5/6/2021  9:13 AM by Roxie Bell MD     7/2020             RESOLVED: Acute left-sided low back pain without sciatica ICD-10-CM: M54.50  ICD-9-CM: 724.2  5/6/2021 - 11/3/2022        RESOLVED: Merleen Marine of mouth and esophagus ([address] ICD-10-CM: B37.81, B37.0  ICD-9-CM: 112.84, 112.0  3/2/2021 - 11/3/2022        RESOLVED: Calculus of gallbladder with acute cholecystitis without obstruction ICD-10-CM: K80.00  ICD-9-CM: 574.00  10/9/2020 - 11/3/2022        RESOLVED: Acute recurrent maxillary sinusitis ICD-10-CM: J01.01  ICD-9-CM: 461.0  8/6/2020 - 11/3/2022        RESOLVED: Ulcer ICD-10-CM: LEE1745  ICD-9-CM: 707.9  Unknown - 11/3/2022        RESOLVED: Obesity ICD-10-CM: E66.9  ICD-9-CM: 278.00  Unknown - 11/3/2022        RESOLVED: Migraine ICD-10-CM: F08.436  ICD-9-CM: 346.90  Unknown - 11/3/2022        RESOLVED: Hypertension ICD-10-CM: I10  ICD-9-CM: 401.9  Unknown - 11/3/2022        RESOLVED: Burning with urination ICD-10-CM: R30.0  ICD-9-CM: 788.1  Unknown - 11/3/2022    Overview Signed 2/13/2017 12:02 PM by Agapito LLANOS     frequent uti             RESOLVED: Arrhythmia ICD-10-CM: I49.9  ICD-9-CM: 427.9  Unknown - 11/3/2022        RESOLVED: Pruritus ICD-10-CM: L29.9  ICD-9-CM: 698.9  9/29/2016 - 11/3/2022        RESOLVED: Recurrent urinary tract infection ICD-10-CM: N39.0  ICD-9-CM: 599.0  9/27/2016 - 11/3/2022        RESOLVED: Nausea and vomiting ICD-10-CM: R11.2  ICD-9-CM: 787.01  4/10/2016 - 11/3/2022        RESOLVED: Gastritis and duodenitis ICD-10-CM: K29.90  ICD-9-CM: 535.50  2/23/2015 - 11/3/2022        RESOLVED: Disease due to gram-negative bacillus ICD-10-CM: A49.9  ICD-9-CM: 041.85  10/17/2014 - 11/3/2022    Overview Signed 2/13/2017 11:58 AM by Agapito LLANOS     Overview:   Acinetobacter baumannii Septicemia and UTI 10/14/2014 (cultures at [facility])             RESOLVED: Fever ICD-10-CM: R50.9  ICD-9-CM: 780.60 10/14/2014 - 11/3/2022        RESOLVED: Exacerbation of asthma ICD-10-CM: J45.901  ICD-9-CM: 493.92  6/25/2014 - 11/3/2022        RESOLVED: Systemic inflammatory response syndrome (SIRS) (HCC) ICD-10-CM: R65.10  ICD-9-CM: 995.90  6/23/2014 - 11/3/2022        RESOLVED: Systemic infection ([facility] ICD-10-CM: A41.9  ICD-9-CM: 038.9  1/7/2014 - 11/3/2022        RESOLVED: Chronic obstructive pulmonary disease ([facility] ICD-10-CM: J44.9  ICD-9-CM: 496  8/21/2013 - 1/20/2022        RESOLVED: Diverticular disease of large intestine ICD-10-CM: K57.30  ICD-9-CM: 562.10  8/21/2013 - 11/3/2022        RESOLVED: Gastroesophageal reflux disease ICD-10-CM: K21.9  ICD-9-CM: 530.81  8/21/2013 - 8/3/2021        RESOLVED: UTI (urinary tract infection) ICD-10-CM: N39.0  ICD-9-CM: 599.0  8/21/2013 - 11/3/2022        RESOLVED: Fecal incontinence ICD-10-CM: R15.9  ICD-9-CM: 787.60  4/17/2013 - 11/3/2022        RESOLVED: Hematuria ICD-10-CM: R31.9  ICD-9-CM: 599.70  4/30/2012 - 11/3/2022        RESOLVED: Adiposity ICD-10-CM: E66.9  ICD-9-CM: 278.00  2/1/2010 - 8/3/2021         Medications reviewed  Current Facility-Administered Medications   Medication Dose Route Frequency    traZODone (DESYREL) tablet 50 mg  50 mg Oral QHS    lidocaine 4 % patch 1 Patch  1 Patch TransDERmal Q24H    phenol throat spray (CHLORASEPTIC) 1 Spray  1 Spray Oral PRN    traMADoL (ULTRAM) tablet 25 mg  25 mg Oral Q6H PRN    piperacillin-tazobactam (ZOSYN) 3.375 g in 0.9% sodium chloride (MBP/ADV) 100 mL MBP  3.375 g IntraVENous Q12H    diphenhydrAMINE (BENADRYL) capsule 25 mg  25 mg Oral Q6H PRN    epoetin jalen-epbx (RETACRIT) injection 8,000 Units  8,000 Units IntraVENous Q MON, WED & FRI    B complex-vitaminC-folic acid (NEPHROCAP) cap  1 Capsule Oral DAILY    albuterol (PROVENTIL HFA, VENTOLIN HFA, PROAIR HFA) inhaler 1 Puff  1 Puff Inhalation Q4H PRN    alum-mag hydroxide-simeth (MYLANTA) oral suspension 10 mL  10 mL Oral Q6H PRN    calcitRIOL (ROCALTROL) capsule 0.5 mcg  0.5 mcg Oral Once per day on Sun Tue Wed Thu Sat    carvediloL (COREG) tablet 25 mg  25 mg Oral BID WITH MEALS    [Held by provider] cinacalcet (SENSIPAR) tablet 30 mg  30 mg Oral DAILY    famotidine (PEPCID) tablet 20 mg  20 mg Oral DAILY    fluticasone propionate (FLONASE) 50 mcg/actuation nasal spray 2 Spray  2 Spray Both Nostrils DAILY    losartan (COZAAR) tablet 50 mg  50 mg Oral DAILY    montelukast (SINGULAIR) tablet 10 mg  10 mg Oral DAILY    [Held by provider] sevelamer carbonate (RENVELA) tab 800 mg  800 mg Oral TID    budesonide-formoteroL (SYMBICORT) 160-4.5 mcg/actuation HFA inhaler 2 Puff  2 Puff Inhalation BID RT    And    tiotropium bromide (SPIRIVA RESPIMAT) 2.5 mcg /actuation  2 Puff Inhalation DAILY    sodium chloride (NS) flush 5-40 mL  5-40 mL IntraVENous Q8H    sodium chloride (NS) flush 5-40 mL  5-40 mL IntraVENous PRN    acetaminophen (TYLENOL) tablet 650 mg  650 mg Oral Q6H PRN    Or    acetaminophen (TYLENOL) suppository 650 mg  650 mg Rectal Q6H PRN    polyethylene glycol (MIRALAX) packet 17 g  17 g Oral DAILY PRN    dextrose 10% infusion 250 mL  250 mL IntraVENous PRN    levothyroxine (SYNTHROID) tablet 100 mcg  100 mcg Oral ACB    promethazine (PHENERGAN) 12.5 mg in 0.9% sodium chloride 50 mL IVPB  12.5 mg IntraVENous Q4H PRN    megestroL (MEGACE) 400 mg/10 mL (10 mL) oral suspension 200 mg  200 mg Oral DAILY    pantoprazole (PROTONIX) tablet 40 mg  40 mg Oral ACB       Review of Systems:   A comprehensive review of systems was negative except for that written in the HPI. Objective:   Physical Exam:     Visit Vitals  BP (!) 112/54 (BP 1 Location: Left leg, BP Patient Position: Sitting)   Pulse 63   Temp 98.1 °F (36.7 °C)   Resp 16   Ht 5' 0.98\" (1.549 m)   Wt 65.1 kg (143 lb 8.3 oz)   SpO2 97%   BMI 27.13 kg/m²      O2 Device: None (Room air)    Temp (24hrs), Av.9 °F (37.2 °C), Min:98.1 °F (36.7 °C), Max:99.7 °F (37.6 °C)    No intake/output data recorded.    1901 - 03/14 0700  In: -   Out: 2000     General:   Awake and alert   Lungs:   Clear to auscultation bilaterally. Chest wall:  No tenderness or deformity. Heart:  Hypotensive. Regular rhythm, S1, S2 normal, no murmur, click, rub or gallop. Abdomen:   Soft, non-tender. Bowel sounds normal. No masses,  No organomegaly. Extremities: Extremities normal, atraumatic, no cyanosis or edema. Pulses: 2+ and symmetric all extremities. Skin: Skin color, texture, turgor normal. No rashes or lesions   Neurologic: CNII-XII intact. No gross focal deficits         Data Review:       Recent Days:  Recent Labs     03/13/23  1502   WBC 6.9   HGB 8.9*   HCT 27.4*          Recent Labs     03/13/23  0820 03/11/23  1642 03/11/23  1459   *  --  133*   K 3.8  --  4.1     --  99   CO2 25  --  28   GLU 71  --  79   BUN 32*  --  19   CREA 6.71*  --  4.37*   CA 7.1* 6.7* 6.5*   PHOS 2.8  --  1.9*   ALB 1.8*  --  1.9*       No results for input(s): PH, PCO2, PO2, HCO3, FIO2 in the last 72 hours.     24 Hour Results:  Recent Results (from the past 24 hour(s))   CBC W/O DIFF    Collection Time: 03/13/23  3:02 PM   Result Value Ref Range    WBC 6.9 3.6 - 11.0 K/uL    RBC 2.92 (L) 3.80 - 5.20 M/uL    HGB 8.9 (L) 11.5 - 16.0 g/dL    HCT 27.4 (L) 35.0 - 47.0 %    MCV 93.8 80.0 - 99.0 FL    MCH 30.5 26.0 - 34.0 PG    MCHC 32.5 30.0 - 36.5 g/dL    RDW 17.3 (H) 11.5 - 14.5 %    PLATELET 087 435 - 186 K/uL    MPV 10.0 8.9 - 12.9 FL    NRBC 0.0 0.0  WBC    ABSOLUTE NRBC 0.00 0.00 - 0.01 K/uL   EKG, 12 LEAD, SUBSEQUENT    Collection Time: 03/13/23  3:24 PM   Result Value Ref Range    Ventricular Rate 84 BPM    Atrial Rate 84 BPM    P-R Interval 192 ms    QRS Duration 90 ms    Q-T Interval 456 ms    QTC Calculation (Bezet) 538 ms    Calculated P Axis 43 degrees    Calculated R Axis 18 degrees    Calculated T Axis 48 degrees    Diagnosis       Normal sinus rhythm  Prolonged QT  Abnormal ECG  When compared with ECG of 12-MAR-2023 16:07,  No significant change was found  Confirmed by Gogo Leal ([id number]) on 3/13/2023 4:27:13 PM         XR HUMERUS RT   Final Result   1. Midline PICC terminates in the right axilla. XR CHEST PORT   Final Result   PICC line catheter is not demonstrated. Interstitial and parenchymal opacities are not significantly changed. XR CHEST PORT   Final Result      A PICC is not identified. Mild diffuse interstitial opacities may represent   pulmonary edema or infection. Assessment:    Recent cardiac arrest secondary to Vtach    Qtc prolongation  -most likely due to medications, like amiodarone    ESRD  -currently undergoing hemodialysis    Paroxysmal atrial fibrillation. Had been on Eliquis which was held due to low hemoglobin    Secondary hypercoagulable state due to A-fib    History of failed renal transplant    Hypothyroidism    Anemia of chronic kidney disease  -improving    Hyponatremia  -secondary to ESRD    Pneumonia  -possibly due to aspiration      Discussion/MDM: Patient with multiple medical comorbidities, each with high likelihood for morbidity and mortality if left untreated. I have reviewed patient's presenting subjective and objective findings, as well as all laboratory studies, imaging studies, and vital signs to date as well as treatment rendered and patient's response to those treatments. In addition, prior medical, surgical and relevant social and family histories were reviewed. I have discussed management plan with patient/family and with nursing staff.          Plan:  Repeat EKG to monitor Qtc intervals  Continue antibiotics for pneumonia  Continue carvedilol and losartan for paroxysmal afib  Continue hemodialysis schedule (M,W,F)  Continue home medications for HTN  Awaiting wearable defibrillator to be fitted      Care Plan discussed with: Patient/Family    Code status: full code    Social determinants of health: none known    Disposition/barriers to discharge: continue inpatient care    Total time spent with patient:      Tommy Dixon

## 2023-03-15 PROCEDURE — 74011250636 HC RX REV CODE- 250/636: Performed by: INTERNAL MEDICINE

## 2023-03-15 PROCEDURE — 65270000029 HC RM PRIVATE

## 2023-03-15 PROCEDURE — 74011250637 HC RX REV CODE- 250/637: Performed by: INTERNAL MEDICINE

## 2023-03-15 PROCEDURE — 90935 HEMODIALYSIS ONE EVALUATION: CPT

## 2023-03-15 PROCEDURE — 94640 AIRWAY INHALATION TREATMENT: CPT

## 2023-03-15 PROCEDURE — 74011250637 HC RX REV CODE- 250/637: Performed by: HOSPITALIST

## 2023-03-15 PROCEDURE — 74011000258 HC RX REV CODE- 258: Performed by: INTERNAL MEDICINE

## 2023-03-15 PROCEDURE — 74011000250 HC RX REV CODE- 250: Performed by: INTERNAL MEDICINE

## 2023-03-15 RX ORDER — AMOXICILLIN AND CLAVULANATE POTASSIUM 875; 125 MG/1; MG/1
1 TABLET, FILM COATED ORAL DAILY
Qty: 5 TABLET | Refills: 0 | Status: SHIPPED | OUTPATIENT
Start: 2023-03-15

## 2023-03-15 RX ADMIN — EPOETIN ALFA-EPBX 8000 UNITS: 4000 INJECTION, SOLUTION INTRAVENOUS; SUBCUTANEOUS at 10:41

## 2023-03-15 RX ADMIN — NEPHROCAP 1 CAPSULE: 1 CAP ORAL at 11:38

## 2023-03-15 RX ADMIN — PIPERACILLIN AND TAZOBACTAM 3.38 G: 3; .375 INJECTION, POWDER, FOR SOLUTION INTRAVENOUS at 11:38

## 2023-03-15 RX ADMIN — CARVEDILOL 25 MG: 12.5 TABLET, FILM COATED ORAL at 17:17

## 2023-03-15 RX ADMIN — MEGESTROL ACETATE 200 MG: 40 SUSPENSION ORAL at 11:38

## 2023-03-15 RX ADMIN — SODIUM CHLORIDE, PRESERVATIVE FREE 10 ML: 5 INJECTION INTRAVENOUS at 14:24

## 2023-03-15 RX ADMIN — FAMOTIDINE 20 MG: 20 TABLET ORAL at 11:38

## 2023-03-15 RX ADMIN — BUDESONIDE AND FORMOTEROL FUMARATE DIHYDRATE 2 PUFF: 160; 4.5 AEROSOL RESPIRATORY (INHALATION) at 19:52

## 2023-03-15 RX ADMIN — TRAZODONE HYDROCHLORIDE 50 MG: 50 TABLET ORAL at 21:01

## 2023-03-15 RX ADMIN — SODIUM CHLORIDE, PRESERVATIVE FREE 10 ML: 5 INJECTION INTRAVENOUS at 06:18

## 2023-03-15 RX ADMIN — LOSARTAN POTASSIUM 50 MG: 50 TABLET, FILM COATED ORAL at 11:38

## 2023-03-15 RX ADMIN — MONTELUKAST 10 MG: 10 TABLET, FILM COATED ORAL at 11:38

## 2023-03-15 RX ADMIN — SODIUM CHLORIDE, PRESERVATIVE FREE 10 ML: 5 INJECTION INTRAVENOUS at 21:01

## 2023-03-15 NOTE — PROGRESS NOTES
11:23: NACHO spoke with Dany Phalen at [employer] ([phone]) to see if she had any update on finding patient a dialysis center that accepts Life Vests. She reported that she has not had any luck yet. She is working on contacting [facility] to see if they can assist. NACHO gave Dany Phalen her direct phone number. Dany Phalen will be in touch with CM with any updates. Discharge is pending dialysis center that accepts [address] [place] [address]. 13:00: NACHO received a call back from Dany Phalen at [employer]. She reported that she found two dialysis centers that can accept Life Vest patients: [address] [place] [address] in [address] and [facility] in [place], [place]. Both facilities are 50+ miles from the patient's home. NACHO and nurse met with patient and her daughter, Vincent Cardenas, to present this information. Patient's daughter stated that she lives in [place], and will be working with her  to see if they can arrange transportation to and from dialysis. She also needs to get in touch with her work to get time off. Yenny Nunes will be getting back with NACHO and Lawson at [employer] to further coordinate dialysis chair. NACHO will continue to follow.

## 2023-03-15 NOTE — DISCHARGE SUMMARY
Physician Discharge Summary     Patient ID:    Alistair Solorzano  [id number]  74 y.o.  1955    Admit date: 3/9/2023    Discharge date : 3/15/2023      Final Diagnoses:   Ventricular tachyarrhythmia [I47.20]  Recent cardiac arrest secondary to Vtach  Qtc prolongation  ESRD  Paroxysmal atrial fibrillation. Had been on Eliquis which was held due to low hemoglobin  Secondary hypercoagulable state due to A-fib  History of failed renal transplant  Hypothyroidism  Anemia of chronic kidney disease  Hyponatremia  Pneumonia       Reason for Hospitalization:    Patient is a 73 yo female with a PMH of ESRD, failed renal transplant, HTN who was transferred from [facility] per cardiology on 3/9/23 due to necessary cardiac catheterization following cardiac arrest, V-tach, and QTc prolongation during dialysis. Patient was originally admitted to [facility] for nausea, vomiting and abdominal cramps possibly due to missed hemodialysis. Hospital Course:     Cardiac catheterization was completed on 3/10 and is negative for CAD showing only tortuous vessels. CXR from 3/10 shows infiltrates concerning for pneumonia. Patient completed dialysis on 3/10 and 4/21 without complications. On 3/14, patient is seen today for follow-up. Patient complains of headache and nausea, which resolved with medication. Patient stated her temperature increased to 99.7 degrees last night. No acute complaints at this time. Labs today show sodium 132, BUN 32, creatinine 6.71. Repeat EKG shows prolonged Qtc with normal sinus rhythm. Patient was fitted for defibrillator vest and will be discharged home with home health. Patient is being discharged pending assignment of a dialysis site that will accept her with her LifeVest which they consider \"high risk. \"        Discharge Medications:   Current Discharge Medication List        START taking these medications    Details   amoxicillin-clavulanate (Augmentin) 875-125 mg per tablet Take 1 Tablet by mouth daily. Qty: 5 Tablet, Refills: 0  Start date: 3/15/2023           CONTINUE these medications which have NOT CHANGED    Details   traZODone (DESYREL) 50 mg tablet TAKE 1 TABLET BY MOUTH EVERY NIGHT FOR INSOMNIA ASSOCIATED WITH DEPRESSION  Qty: 90 Tablet, Refills: 0    Associated Diagnoses: Primary insomnia      Eliquis 2.5 mg tablet TAKE 1 TABLET TWICE DAILY  Qty: 180 Tablet, Refills: 1      losartan (COZAAR) 50 mg tablet TAKE 1 TABLET EVERY MORNING  Qty: 90 Tablet, Refills: 1    Associated Diagnoses: Hypertension, unspecified type      levothyroxine (SYNTHROID) 75 mcg tablet TAKE 1 TABLET EVERY DAY BEFORE BREAKFAST  Qty: 90 Tablet, Refills: 1      montelukast (SINGULAIR) 10 mg tablet TAKE 1 TABLET EVERY MORNING  Qty: 90 Tablet, Refills: 1      simvastatin (ZOCOR) 20 mg tablet TAKE 1 TABLET EVERY DAY AS DIRECTED  Qty: 90 Tablet, Refills: 1      carvediloL (COREG) 25 mg tablet TAKE 1 TABLET TWICE DAILY WITH MEALS  Qty: 180 Tablet, Refills: 1      Trelegy Ellipta 100-62.5-25 mcg inhaler INHALE 1 PUFF EVERY DAY. PLEASE CALL AND SCHEDULE AN APPOINTMENT WITH NEW PCP FOR FUTURE REFILLS  Qty: 60 Blister, Refills: 1      albuterol (PROVENTIL HFA, VENTOLIN HFA, PROAIR HFA) 90 mcg/actuation inhaler Take 1 Puff by inhalation every four (4) hours as needed for Wheezing. Indications: asthma attack  Qty: 18 g, Refills: 1    Associated Diagnoses: Mild persistent asthma without complication      fluticasone propionate (FLONASE) 50 mcg/actuation nasal spray USE 1 SPRAY IN EACH NOSTRIL EVERY MORNING  Qty: 16 g, Refills: 2      Dexilant 60 mg CpDB capsule (delayed release) TAKE 1 CAPSULE BY MOUTH IN THE MORNING. Qty: 90 Capsule, Refills: 0    Associated Diagnoses: Gastroesophageal reflux disease with esophagitis without hemorrhage      polyethylene glycol (Miralax) 17 gram packet Take 1 Packet by mouth two (2) times a day.   Qty: 60 Each, Refills: 2      epoetin jalen (EPOGEN;PROCRIT) 10,000 unit/mL injection 10,000 Units by SubCUTAneous route. cinacalcet (SENSIPAR) 30 mg tablet Take 30 mg by mouth daily. albuterol (PROVENTIL VENTOLIN) 2.5 mg /3 mL (0.083 %) nebu USE 1 VIAL VIA NEBULIZER THREE TIMES DAILY  Qty: 90 mL, Refills: 3    Associated Diagnoses: Mild intermittent asthma without complication      calcitRIOL (ROCALTROL) 0.5 mcg capsule Take 0.5 mcg by mouth See Admin Instructions. Take on non dialysis days (Tues, Wed, Thur, Sat, Sun)  Dialysis is on Monday and Friday      sevelamer carbonate (RENVELA) 800 mg tab tab Take 800 mg by mouth three (3) times daily. aluminum & magnesium hydroxide-simethicone (Maalox Maximum Strength) 400-400-40 mg/5 mL suspension Take 10 mL by mouth every six (6) hours as needed for Indigestion. Qty: 250 mL, Refills: 0           STOP taking these medications       sucralfate (CARAFATE) 1 gram tablet Comments:   Reason for Stopping:         valGANciclovir (VALCYTE) 450 mg tablet Comments:   Reason for Stopping:         dicyclomine (BENTYL) 10 mg capsule Comments:   Reason for Stopping:         amLODIPine (NORVASC) 10 mg tablet Comments:   Reason for Stopping:         diclofenac (VOLTAREN) 1 % gel Comments:   Reason for Stopping:         hyoscyamine SL (LEVSIN/SL) 0.125 mg SL tablet Comments:   Reason for Stopping:         ESTRACE 0.01 % (0.1 mg/gram) vaginal cream Comments:   Reason for Stopping:         cranberry extract 425 mg cap Comments:   Reason for Stopping:         clopidogreL (PLAVIX) 75 mg tab Comments:   Reason for Stopping:         amiodarone (CORDARONE) 200 mg tablet Comments:   Reason for Stopping:         famotidine (PEPCID) 20 mg tablet Comments:   Reason for Stopping:         RenaPlex-D 800 mcg-12.5 mg -2,000 unit tab Comments:   Reason for Stopping: Follow up Care:    1. Jacqualine Cushing, MD in 1-2 weeks. Please call to set up an appointment shortly after discharge. Diet:  Renal Diet    Disposition:  Home.     Advanced Directive:   FULL -   DNR      Discharge Exam:  General:  Alert, cooperative, no distress, appears stated age. Lungs:   Clear to auscultation bilaterally. Chest wall:  No tenderness or deformity. Heart:  Regular rate and rhythm, S1, S2 normal, no murmur, click, rub or gallop. Abdomen:   Soft, non-tender. Bowel sounds normal. No masses,  No organomegaly. Extremities: Extremities normal, atraumatic, no cyanosis or edema. Pulses: 2+ and symmetric all extremities. Skin: Skin color, texture, turgor normal. No rashes or lesions   Neurologic: CNII-XII intact. No gross sensory or motor deficits        CONSULTATIONS: Cardiology and Nephrology    Significant Diagnostic Studies:   3/9/2023: BUN 25 mg/dL (H; Ref range: 6 - 20 mg/dL); BUN 25 mg/dL (H; Ref range: 6 - 20 mg/dL); Calcium 7.1 mg/dL (L; Ref range: 8.5 - 10.1 mg/dL); Calcium 7.2 mg/dL (L; Ref range: 8.5 - 10.1 mg/dL); CO2 27 mmol/L (Ref range: 21 - 32 mmol/L); CO2 26 mmol/L (Ref range: 21 - 32 mmol/L); Creatinine 5.07 mg/dL (H; Ref range: 0.55 - 1.02 mg/dL); Creatinine 5.11 mg/dL (H; Ref range: 0.55 - 1.02 mg/dL); Glucose 149 mg/dL (H; Ref range: 65 - 100 mg/dL); Glucose 150 mg/dL (H; Ref range: 65 - 100 mg/dL); HCT 26.4 % (L; Ref range: 35.0 - 47.0 %); HGB 8.2 g/dL (L; Ref range: 11.5 - 16.0 g/dL); Potassium 3.8 mmol/L (Ref range: 3.5 - 5.1 mmol/L); Potassium 3.7 mmol/L (Ref range: 3.5 - 5.1 mmol/L); Sodium 128 mmol/L (L; Ref range: 136 - 145 mmol/L); Sodium 128 mmol/L (L; Ref range: 136 - 145 mmol/L)  3/10/2023: BUN 28 mg/dL (H; Ref range: 6 - 20 mg/dL); Calcium 7.3 mg/dL (L; Ref range: 8.5 - 10.1 mg/dL); CO2 25 mmol/L (Ref range: 21 - 32 mmol/L); Creatinine 5.87 mg/dL (H; Ref range: 0.55 - 1.02 mg/dL); Glucose 95 mg/dL (Ref range: 65 - 100 mg/dL); HCT 25.4 % (L; Ref range: 35.0 - 47.0 %); HGB 8.1 g/dL (L; Ref range: 11.5 - 16.0 g/dL); Potassium 4.1 mmol/L (Ref range: 3.5 - 5.1 mmol/L);  Sodium 129 mmol/L (L; Ref range: 136 - 145 mmol/L)  Recent Labs 03/13/23  1502   WBC 6.9   HGB 8.9*   HCT 27.4*          Recent Labs     03/14/23  1900 03/13/23  0820   * 132*   K 3.8 3.8   CL 99 100   CO2 26 25   BUN 15 32*   CREA 4.49* 6.71*   * 71   CA 7.2* 7.1*   MG 2.0  --    PHOS 2.1* 2.8       Recent Labs     03/14/23  1900 03/13/23  0820   ALB 1.8* 1.8*       No results for input(s): INR, PTP, APTT, INREXT, INREXT in the last 72 hours. No results for input(s): FE, TIBC, PSAT, FERR in the last 72 hours. No results for input(s): PH, PCO2, PO2 in the last 72 hours. No results for input(s): CPK, CKMB in the last 72 hours.     No lab exists for component: TROPONINI  Lab Results   Component Value Date/Time    Glucose (POC) 100 03/14/2023 01:55 PM    Glucose (POC) 94 03/12/2023 07:43 PM    Glucose (POC) 73 03/12/2023 12:28 PM    Glucose (POC) 104 (H) 03/11/2023 04:51 PM    Glucose (POC) 90 03/11/2023 11:15 AM       Discharge time spent 35 minutes    Signed:  Kirstie Graff MD  3/15/2023  7:33 AM

## 2023-03-15 NOTE — MED STUDENT NOTES
Physician Discharge Summary     Patient ID:    Arjun Iraheta  [id number]  74 y.o.  1955    Admit date: 3/9/2023    Discharge date : 3/15/2023      Final Diagnoses:   Ventricular tachyarrhythmia [I47.20]  Recent cardiac arrest secondary to Vtach  Qtc prolongation  ESRD  Paroxysmal atrial fibrillation. Had been on Eliquis which was held due to low hemoglobin  Secondary hypercoagulable state due to A-fib  History of failed renal transplant  Hypothyroidism  Anemia of chronic kidney disease  Hyponatremia  Pneumonia       Reason for Hospitalization:    Patient is a 73 yo female with a PMH of ESRD, failed renal transplant, HTN who was transferred from [facility] per cardiology on 3/9/23 due to necessary cardiac catheterization following cardiac arrest, V-tach, and QTc prolongation during dialysis. Patient was originally admitted to [facility] for nausea, vomiting and abdominal cramps possibly due to missed hemodialysis. Hospital Course:     Cardiac catheterization was completed on 3/10 and is negative for CAD showing only tortuous vessels. CXR from 3/10 shows infiltrates concerning for pneumonia. Patient completed dialysis on 3/10 and 2/61 without complications. On 3/14, patient is seen today for follow-up. Patient complains of headache and nausea, which resolved with medication. Patient stated her temperature increased to 99.7 degrees last night. No acute complaints at this time. Labs today show sodium 132, BUN 32, creatinine 6.71. Repeat EKG shows prolonged Qtc with normal sinus rhythm. Patient was fitted for defibrillator vest and will be discharged home with home health. Discharge Medications:   Current Discharge Medication List        START taking these medications    Details   amoxicillin-clavulanate (Augmentin) 875-125 mg per tablet Take 1 Tablet by mouth daily.   Qty: 5 Tablet, Refills: 0  Start date: 3/15/2023           CONTINUE these medications which have NOT CHANGED    Details   traZODone (DESYREL) 50 mg tablet TAKE 1 TABLET BY MOUTH EVERY NIGHT FOR INSOMNIA ASSOCIATED WITH DEPRESSION  Qty: 90 Tablet, Refills: 0    Associated Diagnoses: Primary insomnia      Eliquis 2.5 mg tablet TAKE 1 TABLET TWICE DAILY  Qty: 180 Tablet, Refills: 1      losartan (COZAAR) 50 mg tablet TAKE 1 TABLET EVERY MORNING  Qty: 90 Tablet, Refills: 1    Associated Diagnoses: Hypertension, unspecified type      levothyroxine (SYNTHROID) 75 mcg tablet TAKE 1 TABLET EVERY DAY BEFORE BREAKFAST  Qty: 90 Tablet, Refills: 1      montelukast (SINGULAIR) 10 mg tablet TAKE 1 TABLET EVERY MORNING  Qty: 90 Tablet, Refills: 1      simvastatin (ZOCOR) 20 mg tablet TAKE 1 TABLET EVERY DAY AS DIRECTED  Qty: 90 Tablet, Refills: 1      carvediloL (COREG) 25 mg tablet TAKE 1 TABLET TWICE DAILY WITH MEALS  Qty: 180 Tablet, Refills: 1      Trelegy Ellipta 100-62.5-25 mcg inhaler INHALE 1 PUFF EVERY DAY. PLEASE CALL AND SCHEDULE AN APPOINTMENT WITH NEW PCP FOR FUTURE REFILLS  Qty: 60 Blister, Refills: 1      albuterol (PROVENTIL HFA, VENTOLIN HFA, PROAIR HFA) 90 mcg/actuation inhaler Take 1 Puff by inhalation every four (4) hours as needed for Wheezing. Indications: asthma attack  Qty: 18 g, Refills: 1    Associated Diagnoses: Mild persistent asthma without complication      fluticasone propionate (FLONASE) 50 mcg/actuation nasal spray USE 1 SPRAY IN EACH NOSTRIL EVERY MORNING  Qty: 16 g, Refills: 2      Dexilant 60 mg CpDB capsule (delayed release) TAKE 1 CAPSULE BY MOUTH IN THE MORNING. Qty: 90 Capsule, Refills: 0    Associated Diagnoses: Gastroesophageal reflux disease with esophagitis without hemorrhage      polyethylene glycol (Miralax) 17 gram packet Take 1 Packet by mouth two (2) times a day. Qty: 60 Each, Refills: 2      epoetin jalen (EPOGEN;PROCRIT) 10,000 unit/mL injection 10,000 Units by SubCUTAneous route. cinacalcet (SENSIPAR) 30 mg tablet Take 30 mg by mouth daily.       albuterol (PROVENTIL VENTOLIN) 2.5 mg /3 mL (0.083 %) nebu USE 1 VIAL VIA NEBULIZER THREE TIMES DAILY  Qty: 90 mL, Refills: 3    Associated Diagnoses: Mild intermittent asthma without complication      calcitRIOL (ROCALTROL) 0.5 mcg capsule Take 0.5 mcg by mouth See Admin Instructions. Take on non dialysis days (Tues, Wed, Thur, Sat, Sun)  Dialysis is on Monday and Friday      sevelamer carbonate (RENVELA) 800 mg tab tab Take 800 mg by mouth three (3) times daily. aluminum & magnesium hydroxide-simethicone (Maalox Maximum Strength) 400-400-40 mg/5 mL suspension Take 10 mL by mouth every six (6) hours as needed for Indigestion. Qty: 250 mL, Refills: 0           STOP taking these medications       sucralfate (CARAFATE) 1 gram tablet Comments:   Reason for Stopping:         valGANciclovir (VALCYTE) 450 mg tablet Comments:   Reason for Stopping:         dicyclomine (BENTYL) 10 mg capsule Comments:   Reason for Stopping:         amLODIPine (NORVASC) 10 mg tablet Comments:   Reason for Stopping:         diclofenac (VOLTAREN) 1 % gel Comments:   Reason for Stopping:         hyoscyamine SL (LEVSIN/SL) 0.125 mg SL tablet Comments:   Reason for Stopping:         ESTRACE 0.01 % (0.1 mg/gram) vaginal cream Comments:   Reason for Stopping:         cranberry extract 425 mg cap Comments:   Reason for Stopping:         clopidogreL (PLAVIX) 75 mg tab Comments:   Reason for Stopping:         amiodarone (CORDARONE) 200 mg tablet Comments:   Reason for Stopping:         famotidine (PEPCID) 20 mg tablet Comments:   Reason for Stopping:         RenaPlex-D 800 mcg-12.5 mg -2,000 unit tab Comments:   Reason for Stopping: Follow up Care:    1. Munir Real MD in 1-2 weeks. Please call to set up an appointment shortly after discharge. Diet:  Renal Diet    Disposition:  Home. Advanced Directive:   FULL -   DNR      Discharge Exam:  General:  Alert, cooperative, no distress, appears stated age.    Lungs:   Clear to auscultation bilaterally. Chest wall:  No tenderness or deformity. Heart:  Regular rate and rhythm, S1, S2 normal, no murmur, click, rub or gallop. Abdomen:   Soft, non-tender. Bowel sounds normal. No masses,  No organomegaly. Extremities: Extremities normal, atraumatic, no cyanosis or edema. Pulses: 2+ and symmetric all extremities. Skin: Skin color, texture, turgor normal. No rashes or lesions   Neurologic: CNII-XII intact. No gross sensory or motor deficits        CONSULTATIONS: Cardiology and Nephrology    Significant Diagnostic Studies:   3/9/2023: BUN 25 mg/dL (H; Ref range: 6 - 20 mg/dL); BUN 25 mg/dL (H; Ref range: 6 - 20 mg/dL); Calcium 7.1 mg/dL (L; Ref range: 8.5 - 10.1 mg/dL); Calcium 7.2 mg/dL (L; Ref range: 8.5 - 10.1 mg/dL); CO2 27 mmol/L (Ref range: 21 - 32 mmol/L); CO2 26 mmol/L (Ref range: 21 - 32 mmol/L); Creatinine 5.07 mg/dL (H; Ref range: 0.55 - 1.02 mg/dL); Creatinine 5.11 mg/dL (H; Ref range: 0.55 - 1.02 mg/dL); Glucose 149 mg/dL (H; Ref range: 65 - 100 mg/dL); Glucose 150 mg/dL (H; Ref range: 65 - 100 mg/dL); HCT 26.4 % (L; Ref range: 35.0 - 47.0 %); HGB 8.2 g/dL (L; Ref range: 11.5 - 16.0 g/dL); Potassium 3.8 mmol/L (Ref range: 3.5 - 5.1 mmol/L); Potassium 3.7 mmol/L (Ref range: 3.5 - 5.1 mmol/L); Sodium 128 mmol/L (L; Ref range: 136 - 145 mmol/L); Sodium 128 mmol/L (L; Ref range: 136 - 145 mmol/L)  3/10/2023: BUN 28 mg/dL (H; Ref range: 6 - 20 mg/dL); Calcium 7.3 mg/dL (L; Ref range: 8.5 - 10.1 mg/dL); CO2 25 mmol/L (Ref range: 21 - 32 mmol/L); Creatinine 5.87 mg/dL (H; Ref range: 0.55 - 1.02 mg/dL); Glucose 95 mg/dL (Ref range: 65 - 100 mg/dL); HCT 25.4 % (L; Ref range: 35.0 - 47.0 %); HGB 8.1 g/dL (L; Ref range: 11.5 - 16.0 g/dL); Potassium 4.1 mmol/L (Ref range: 3.5 - 5.1 mmol/L);  Sodium 129 mmol/L (L; Ref range: 136 - 145 mmol/L)  Recent Labs     03/13/23  1502   WBC 6.9   HGB 8.9*   HCT 27.4*        Recent Labs     03/14/23  1900 03/13/23  0820   * 132*   K 3.8 3.8   CL 99 100   CO2 26 25   BUN 15 32*   CREA 4.49* 6.71*   * 71   CA 7.2* 7.1*   MG 2.0  --    PHOS 2.1* 2.8     Recent Labs     03/14/23  1900 03/13/23  0820   ALB 1.8* 1.8*     No results for input(s): INR, PTP, APTT, INREXT in the last 72 hours. No results for input(s): FE, TIBC, PSAT, FERR in the last 72 hours. No results for input(s): PH, PCO2, PO2 in the last 72 hours. No results for input(s): CPK, CKMB in the last 72 hours. No lab exists for component: TROPONINI  Lab Results   Component Value Date/Time    Glucose (POC) 100 03/14/2023 01:55 PM    Glucose (POC) 94 03/12/2023 07:43 PM    Glucose (POC) 73 03/12/2023 12:28 PM    Glucose (POC) 104 (H) 03/11/2023 04:51 PM    Glucose (POC) 90 03/11/2023 11:15 AM       Discharge time spent 35 minutes    Signed:  Sandra Lang  3/15/2023  7:33 AM   *ATTENTION:  This note has been created by a medical student for educational purposes only. Please do not refer to the content of this note for clinical decision-making, billing, or other purposes. Please see attending physicians note to obtain clinical information on this patient. *

## 2023-03-15 NOTE — DIALYSIS
Pt HD ended 10 minutes early of 3 hour treatment, per Dr Kassandra Huff order. 1.8 liters net fluid removed. Epogen 8000 units given IV. Report called to Elysia nixon.     Dr Helen Lamb here/saw pt on dialysis

## 2023-03-15 NOTE — PROGRESS NOTES
[facility]     Renal Daily Progress Note:     Admission Date: 3/9/2023     Subjective: Chart reviewed, patient examined. She was seen on dialysis. Awake and alert. Awaiting placement of defibrillator vest.  Not short of breath, no chest pain. Able to eat. No nausea or vomiting. No lower extremity edema. Review of Systems  Pertinent items are noted in HPI.     Objective:     Visit Vitals  BP (P) 128/65   Pulse (P) 71   Temp 98.7 °F (37.1 °C)   Resp 18   Ht 5' 0.98\" (1.549 m)   Wt 65.1 kg (143 lb 8.3 oz)   SpO2 98%   BMI 27.13 kg/m²     Temp (24hrs), Av.3 °F (36.8 °C), Min:98.1 °F (36.7 °C), Max:98.7 °F (37.1 °C)      No intake or output data in the 24 hours ending 03/15/23 1107    Current Facility-Administered Medications   Medication Dose Route Frequency    traZODone (DESYREL) tablet 50 mg  50 mg Oral QHS    lidocaine 4 % patch 1 Patch  1 Patch TransDERmal Q24H    phenol throat spray (CHLORASEPTIC) 1 Spray  1 Spray Oral PRN    traMADoL (ULTRAM) tablet 25 mg  25 mg Oral Q6H PRN    piperacillin-tazobactam (ZOSYN) 3.375 g in 0.9% sodium chloride (MBP/ADV) 100 mL MBP  3.375 g IntraVENous Q12H    diphenhydrAMINE (BENADRYL) capsule 25 mg  25 mg Oral Q6H PRN    epoetin jalen-epbx (RETACRIT) injection 8,000 Units  8,000 Units IntraVENous Q MON, WED & FRI    B complex-vitaminC-folic acid (NEPHROCAP) cap  1 Capsule Oral DAILY    albuterol (PROVENTIL HFA, VENTOLIN HFA, PROAIR HFA) inhaler 1 Puff  1 Puff Inhalation Q4H PRN    alum-mag hydroxide-simeth (MYLANTA) oral suspension 10 mL  10 mL Oral Q6H PRN    calcitRIOL (ROCALTROL) capsule 0.5 mcg  0.5 mcg Oral Once per day on Sun Tue Wed Thu Sat    carvediloL (COREG) tablet 25 mg  25 mg Oral BID WITH MEALS    [Held by provider] cinacalcet (SENSIPAR) tablet 30 mg  30 mg Oral DAILY    famotidine (PEPCID) tablet 20 mg  20 mg Oral DAILY    fluticasone propionate (FLONASE) 50 mcg/actuation nasal spray 2 Spray  2 Spray Both Nostrils DAILY    losartan (COZAAR) tablet 50 mg  50 mg Oral DAILY    montelukast (SINGULAIR) tablet 10 mg  10 mg Oral DAILY    [Held by provider] sevelamer carbonate (RENVELA) tab 800 mg  800 mg Oral TID    budesonide-formoteroL (SYMBICORT) 160-4.5 mcg/actuation HFA inhaler 2 Puff  2 Puff Inhalation BID RT    And    tiotropium bromide (SPIRIVA RESPIMAT) 2.5 mcg /actuation  2 Puff Inhalation DAILY    sodium chloride (NS) flush 5-40 mL  5-40 mL IntraVENous Q8H    sodium chloride (NS) flush 5-40 mL  5-40 mL IntraVENous PRN    acetaminophen (TYLENOL) tablet 650 mg  650 mg Oral Q6H PRN    Or    acetaminophen (TYLENOL) suppository 650 mg  650 mg Rectal Q6H PRN    polyethylene glycol (MIRALAX) packet 17 g  17 g Oral DAILY PRN    dextrose 10% infusion 250 mL  250 mL IntraVENous PRN    levothyroxine (SYNTHROID) tablet 100 mcg  100 mcg Oral ACB    promethazine (PHENERGAN) 12.5 mg in 0.9% sodium chloride 50 mL IVPB  12.5 mg IntraVENous Q4H PRN    megestroL (MEGACE) 400 mg/10 mL (10 mL) oral suspension 200 mg  200 mg Oral DAILY    pantoprazole (PROTONIX) tablet 40 mg  40 mg Oral ACB       Physical Exam:Head: Normocephalic, without obvious abnormality, atraumatic  Neck: supple, symmetrical, trachea midline, no adenopathy, thyroid: not enlarged, symmetric, no tenderness/mass/nodules, and no JVD  Lungs: clear to auscultation bilaterally  Heart: regular rate and rhythm, no S3 or S4  Abdomen: soft, non-tender. Bowel sounds normal. No masses,  no organomegaly  Extremities: no edema  Neurologic: Grossly normal    Data Review:     LABS:  Recent Labs     03/14/23  1900 03/13/23  0820   * 132*   K 3.8 3.8   CL 99 100   CO2 26 25   BUN 15 32*   CREA 4.49* 6.71*   CA 7.2* 7.1*   ALB 1.8* 1.8*   PHOS 2.1* 2.8   MG 2.0  --        Recent Labs     03/13/23  1502   WBC 6.9   HGB 8.9*   HCT 27.4*          No results for input(s): AKBAR, KU, LORELEI, EMMA in the last 72 hours.     No lab exists for component: PROU    Assessment:   Renal Specific Problems  CKD 6  cardiomyopathy  History of atrial fibrillation  History of cardiac arrest  Hypoalbuminemia    Plan:     Obtain/ Order: labs/cultures/radiology/procedures:  renal panel , magnesium      Therapeutic:    Dialysis ongoing, change to M-F treatment schedule after today's rx.  Per patient she is 2 x/ wk  push Protein intake        Marisel Cornelius MD    [phone]

## 2023-03-15 NOTE — PROGRESS NOTES
Problem: Risk for Spread of Infection  Goal: Prevent transmission of infectious organism to others  Description: Prevent the transmission of infectious organisms to other patients, staff members, and visitors. Outcome: Progressing Towards Goal     Problem: Patient Education:  Go to Education Activity  Goal: Patient/Family Education  Outcome: Progressing Towards Goal     Problem: Falls - Risk of  Goal: *Absence of Falls  Description: Document Mona Nava Fall Risk and appropriate interventions in the flowsheet. Outcome: Progressing Towards Goal  Note: Fall Risk Interventions:                                Problem: Patient Education: Go to Patient Education Activity  Goal: Patient/Family Education  Outcome: Progressing Towards Goal     Problem: Pressure Injury - Risk of  Goal: *Prevention of pressure injury  Description: Document Quinn Scale and appropriate interventions in the flowsheet.   Outcome: Progressing Towards Goal  Note: Pressure Injury Interventions:  Sensory Interventions: Keep linens dry and wrinkle-free, Minimize linen layers    Moisture Interventions: Absorbent underpads, Minimize layers, Moisture barrier    Activity Interventions: Pressure redistribution bed/mattress(bed type), PT/OT evaluation    Mobility Interventions: Pressure redistribution bed/mattress (bed type), PT/OT evaluation    Nutrition Interventions: Document food/fluid/supplement intake                     Problem: Patient Education: Go to Patient Education Activity  Goal: Patient/Family Education  Outcome: Progressing Towards Goal     Problem: Patient Education: Go to Patient Education Activity  Goal: Patient/Family Education  Outcome: Progressing Towards Goal     Problem: Patient Education: Go to Patient Education Activity  Goal: Patient/Family Education  Outcome: Progressing Towards Goal

## 2023-03-15 NOTE — PROGRESS NOTES
Problem: Risk for Spread of Infection  Goal: Prevent transmission of infectious organism to others  Description: Prevent the transmission of infectious organisms to other patients, staff members, and visitors. Outcome: Progressing Towards Goal     Problem: Patient Education:  Go to Education Activity  Goal: Patient/Family Education  Outcome: Progressing Towards Goal     Problem: Falls - Risk of  Goal: *Absence of Falls  Description: Document Viola Wilson Fall Risk and appropriate interventions in the flowsheet. Outcome: Progressing Towards Goal  Note: Fall Risk Interventions:                                Problem: Patient Education: Go to Patient Education Activity  Goal: Patient/Family Education  Outcome: Progressing Towards Goal     Problem: Pressure Injury - Risk of  Goal: *Prevention of pressure injury  Description: Document Quinn Scale and appropriate interventions in the flowsheet.   Outcome: Progressing Towards Goal  Note: Pressure Injury Interventions:  Sensory Interventions: Keep linens dry and wrinkle-free    Moisture Interventions: Absorbent underpads    Activity Interventions: Pressure redistribution bed/mattress(bed type)    Mobility Interventions: Pressure redistribution bed/mattress (bed type)    Nutrition Interventions: Document food/fluid/supplement intake                     Problem: Patient Education: Go to Patient Education Activity  Goal: Patient/Family Education  Outcome: Progressing Towards Goal     Problem: Patient Education: Go to Patient Education Activity  Goal: Patient/Family Education  Outcome: Progressing Towards Goal     Problem: Patient Education: Go to Patient Education Activity  Goal: Patient/Family Education  Outcome: Progressing Towards Goal

## 2023-03-16 PROCEDURE — 74011250637 HC RX REV CODE- 250/637: Performed by: HOSPITALIST

## 2023-03-16 PROCEDURE — 74011000258 HC RX REV CODE- 258: Performed by: INTERNAL MEDICINE

## 2023-03-16 PROCEDURE — 65270000029 HC RM PRIVATE

## 2023-03-16 PROCEDURE — 74011250636 HC RX REV CODE- 250/636: Performed by: INTERNAL MEDICINE

## 2023-03-16 PROCEDURE — 74011000250 HC RX REV CODE- 250: Performed by: INTERNAL MEDICINE

## 2023-03-16 PROCEDURE — 94640 AIRWAY INHALATION TREATMENT: CPT

## 2023-03-16 PROCEDURE — 74011250637 HC RX REV CODE- 250/637: Performed by: INTERNAL MEDICINE

## 2023-03-16 PROCEDURE — 97535 SELF CARE MNGMENT TRAINING: CPT

## 2023-03-16 RX ADMIN — CALCITRIOL CAPSULES 0.25 MCG 0.5 MCG: 0.25 CAPSULE ORAL at 09:34

## 2023-03-16 RX ADMIN — PANTOPRAZOLE SODIUM 40 MG: 40 TABLET, DELAYED RELEASE ORAL at 09:42

## 2023-03-16 RX ADMIN — BUDESONIDE AND FORMOTEROL FUMARATE DIHYDRATE 2 PUFF: 160; 4.5 AEROSOL RESPIRATORY (INHALATION) at 19:49

## 2023-03-16 RX ADMIN — LOSARTAN POTASSIUM 50 MG: 50 TABLET, FILM COATED ORAL at 09:34

## 2023-03-16 RX ADMIN — FLUTICASONE PROPIONATE 2 SPRAY: 50 SPRAY, METERED NASAL at 09:35

## 2023-03-16 RX ADMIN — MEGESTROL ACETATE 200 MG: 40 SUSPENSION ORAL at 09:37

## 2023-03-16 RX ADMIN — PIPERACILLIN AND TAZOBACTAM 3.38 G: 3; .375 INJECTION, POWDER, FOR SOLUTION INTRAVENOUS at 00:14

## 2023-03-16 RX ADMIN — SODIUM CHLORIDE, PRESERVATIVE FREE 10 ML: 5 INJECTION INTRAVENOUS at 22:58

## 2023-03-16 RX ADMIN — CARVEDILOL 25 MG: 12.5 TABLET, FILM COATED ORAL at 16:50

## 2023-03-16 RX ADMIN — SODIUM CHLORIDE, PRESERVATIVE FREE 10 ML: 5 INJECTION INTRAVENOUS at 14:57

## 2023-03-16 RX ADMIN — TRAZODONE HYDROCHLORIDE 50 MG: 50 TABLET ORAL at 22:02

## 2023-03-16 RX ADMIN — FAMOTIDINE 20 MG: 20 TABLET ORAL at 09:34

## 2023-03-16 RX ADMIN — SODIUM CHLORIDE, PRESERVATIVE FREE 10 ML: 5 INJECTION INTRAVENOUS at 06:19

## 2023-03-16 RX ADMIN — CARVEDILOL 25 MG: 12.5 TABLET, FILM COATED ORAL at 09:34

## 2023-03-16 RX ADMIN — TRAMADOL HYDROCHLORIDE 25 MG: 50 TABLET ORAL at 06:26

## 2023-03-16 RX ADMIN — MONTELUKAST 10 MG: 10 TABLET, FILM COATED ORAL at 09:34

## 2023-03-16 RX ADMIN — NEPHROCAP 1 CAPSULE: 1 CAP ORAL at 09:34

## 2023-03-16 RX ADMIN — PIPERACILLIN AND TAZOBACTAM 3.38 G: 3; .375 INJECTION, POWDER, FOR SOLUTION INTRAVENOUS at 14:54

## 2023-03-16 RX ADMIN — LEVOTHYROXINE SODIUM 100 MCG: 0.1 TABLET ORAL at 09:38

## 2023-03-16 NOTE — PROGRESS NOTES
[facility]     Renal Daily Progress Note:     Admission Date: 3/9/2023     Subjective: Chart reviewed, patient examined. She was seen in room 476 while getting fitted for LifeVest.  Daughter present at bedside, awake and alert. Not short of breath, no chest pain. Able to eat. No nausea or vomiting. No lower extremity edema. Review of Systems  Pertinent items are noted in HPI.     Objective:     Visit Vitals  BP (!) 149/65 (BP 1 Location: Right leg, BP Patient Position: At rest;Lying)   Pulse 72   Temp 97.9 °F (36.6 °C)   Resp 18   Ht 5' 0.98\" (1.549 m)   Wt 63.1 kg (139 lb 1.8 oz)   SpO2 98%   BMI 26.30 kg/m²     Temp (24hrs), Av.3 °F (36.8 °C), Min:97.7 °F (36.5 °C), Max:98.7 °F (37.1 °C)      No intake or output data in the 24 hours ending 23 1623    Current Facility-Administered Medications   Medication Dose Route Frequency    traZODone (DESYREL) tablet 50 mg  50 mg Oral QHS    lidocaine 4 % patch 1 Patch  1 Patch TransDERmal Q24H    phenol throat spray (CHLORASEPTIC) 1 Spray  1 Spray Oral PRN    traMADoL (ULTRAM) tablet 25 mg  25 mg Oral Q6H PRN    piperacillin-tazobactam (ZOSYN) 3.375 g in 0.9% sodium chloride (MBP/ADV) 100 mL MBP  3.375 g IntraVENous Q12H    diphenhydrAMINE (BENADRYL) capsule 25 mg  25 mg Oral Q6H PRN    epoetin jalen-epbx (RETACRIT) injection 8,000 Units  8,000 Units IntraVENous Q MON, WED & FRI    B complex-vitaminC-folic acid (NEPHROCAP) cap  1 Capsule Oral DAILY    albuterol (PROVENTIL HFA, VENTOLIN HFA, PROAIR HFA) inhaler 1 Puff  1 Puff Inhalation Q4H PRN    alum-mag hydroxide-simeth (MYLANTA) oral suspension 10 mL  10 mL Oral Q6H PRN    calcitRIOL (ROCALTROL) capsule 0.5 mcg  0.5 mcg Oral Once per day on Sun Tue Wed Thu Sat    carvediloL (COREG) tablet 25 mg  25 mg Oral BID WITH MEALS    [Held by provider] cinacalcet (SENSIPAR) tablet 30 mg  30 mg Oral DAILY    famotidine (PEPCID) tablet 20 mg  20 mg Oral DAILY    fluticasone propionate (FLONASE) 50 mcg/actuation nasal spray 2 Spray  2 Spray Both Nostrils DAILY    losartan (COZAAR) tablet 50 mg  50 mg Oral DAILY    montelukast (SINGULAIR) tablet 10 mg  10 mg Oral DAILY    [Held by provider] sevelamer carbonate (RENVELA) tab 800 mg  800 mg Oral TID    budesonide-formoteroL (SYMBICORT) 160-4.5 mcg/actuation HFA inhaler 2 Puff  2 Puff Inhalation BID RT    And    tiotropium bromide (SPIRIVA RESPIMAT) 2.5 mcg /actuation  2 Puff Inhalation DAILY    sodium chloride (NS) flush 5-40 mL  5-40 mL IntraVENous Q8H    sodium chloride (NS) flush 5-40 mL  5-40 mL IntraVENous PRN    acetaminophen (TYLENOL) tablet 650 mg  650 mg Oral Q6H PRN    Or    acetaminophen (TYLENOL) suppository 650 mg  650 mg Rectal Q6H PRN    polyethylene glycol (MIRALAX) packet 17 g  17 g Oral DAILY PRN    dextrose 10% infusion 250 mL  250 mL IntraVENous PRN    levothyroxine (SYNTHROID) tablet 100 mcg  100 mcg Oral ACB    promethazine (PHENERGAN) 12.5 mg in 0.9% sodium chloride 50 mL IVPB  12.5 mg IntraVENous Q4H PRN    megestroL (MEGACE) 400 mg/10 mL (10 mL) oral suspension 200 mg  200 mg Oral DAILY    pantoprazole (PROTONIX) tablet 40 mg  40 mg Oral ACB       Physical Exam:Head: Normocephalic, without obvious abnormality, atraumatic  Neck: supple, symmetrical, trachea midline, no adenopathy, thyroid: not enlarged, symmetric, no tenderness/mass/nodules, and no JVD  Lungs: clear to auscultation bilaterally  Heart: regular rate and rhythm, no S3 or S4  Abdomen: soft, non-tender. Bowel sounds normal. No masses,  no organomegaly  Extremities: no edema  Neurologic: Grossly normal    Data Review:     LABS:  Recent Labs     03/14/23  1900   *   K 3.8   CL 99   CO2 26   BUN 15   CREA 4.49*   CA 7.2*   ALB 1.8*   PHOS 2.1*   MG 2.0       No results for input(s): WBC, HGB, HCT, PLT, HGBEXT, HCTEXT, PLTEXT, HGBEXT, HCTEXT, PLTEXT in the last 72 hours. No results for input(s): AKBAR, KU, CLU, CREKYRA in the last 72 hours.     No lab exists for component: PROU    Assessment:   Renal Specific Problems  CKD 6  cardiomyopathy  History of atrial fibrillation  History of cardiac arrest  Hypoalbuminemia    Plan:     Obtain/ Order: labs/cultures/radiology/procedures:  renal panel , magnesium      Therapeutic:    Dialysis in a.m. and then discharge afterwards   Protein intake        Lou Burnham MD    [phone]

## 2023-03-16 NOTE — PROGRESS NOTES
OCCUPATIONAL THERAPY TREATMENT  Patient: Camryn Buenrostro (18 y.o. female)  Date: 3/16/2023  Diagnosis: Ventricular tachyarrhythmia [I47.20] <principal problem not specified>  Procedure(s) (LRB):  LEFT HEART CATH / CORONARY ANGIOGRAPHY (N/A) 6 Days Post-Op  Precautions: FALL  In place during session: EKG/telemetry   Chart, occupational therapy assessment, plan of care, and goals were reviewed. ASSESSMENT  Pt continues with skilled OT services and is progressing towards goals. Pt received semi-supine in bed upon arrival, AXO x4 and agreeable to WORLEY tx at this time. Pt cooperative with much encouragement and demonstrated fair effort during activities. Pt tolerated therapy session fairly with c/o pain in chest with movement. Pt much improved with bed mobility<>EOB using bed rail with decreased assistance. Pt set-up for grooming with good static/dynamic sitting balance. Pt declined OOB and further self care needs. Pt stated \"I can do it better at home\". Overall, pt continues to present with deficits in generalized strength/AROM,  static/dynamic standing balance and functional activity tolerance during performance of ADLs/mobility (see below for objective details and assist levels). Will continue to progress. Recommend d/c to SNF vs HHOT  once medically appropriate. Other factors to consider for discharge: Time of onset, medical prognosis/diagnosis, severity of deficits, PLOF, functional baseline, home environment, and family support          PLAN :  Patient continues to benefit from skilled intervention to address the above impairments. Continue treatment per established plan of care. to address goals. Recommend with staff: Out of bed to chair for meals, Encourage HEP in prep for ADLs/mobility, and Frequent repositioning to prevent skin breakdown    Recommend next session:  Toileting    Recommendation for discharge: (in order for the patient to meet his/her long term goals)  SNF vs HHOT    This discharge recommendation:  Has been made in collaboration with the attending provider and/or case management       IF patient discharges home will need the following DME: TBD       SUBJECTIVE:   Patient stated I can do more at home then I can here.     OBJECTIVE DATA SUMMARY:   Cognitive/Behavioral Status:  Neurologic State: Alert  Orientation Level: Oriented X4  Cognition: Follows commands             Functional Mobility and Transfers for ADLs:  Bed Mobility:  Rolling: Stand-by assistance  Supine to Sit: Stand-by assistance  Sit to Supine: Stand-by assistance  Scooting: Stand-by assistance    Transfers:             Balance:  Sitting: Intact    ADL Intervention:       Grooming  Grooming Assistance: Set-up  Position Performed: Seated edge of bed  Washing Face: Set-up              Pain:  8/10 chest pain with movement however decreased with rest 6/10    Activity Tolerance:   Fair and requires rest breaks    After treatment patient left in no apparent distress:   Supine in bed, Call bell within reach, Bed / chair alarm activated, and Side rails x 3, bed locked and in lowest position    COMMUNICATION/COLLABORATION:   The patients plan of care was discussed with: Registered nurse. BRUNILDA Liriano  Time Calculation: 13 mins     Problem: Self Care Deficits Care Plan (Adult)  Goal: *Acute Goals and Plan of Care (Insert Text)  Description: FUNCTIONAL STATUS PRIOR TO ADMISSION: Patient was independent and active without use of DME. Patient was independent for basic and instrumental ADLs.      HOME SUPPORT: The patient lived with sister but did not require assist.    Occupational Therapy Goals  Initiated 3/14/2023    Pt stated goal I want to go home  Pt will be IND sup <> sit in prep for EOB ADLs  Pt will be Mod I grooming standing sink side LRAD  Pt will be IND UB dressing sitting EOB/long sit   Pt will be IND LE dressing sitting EOB/long sit  Pt will be Mod I sit <>  prep for toileting LRAD  Pt will be Mod I toileting/toilet transfer/cloth mgmt LRAD  Pt will be IND following UE HEP in prep for self care tasks   Outcome: Progressing Towards Goal

## 2023-03-16 NOTE — DISCHARGE SUMMARY
Physician Discharge Summary     Patient ID:    Alvaro Munoz  [id number]  74 y.o.  1955    Admit date: 3/9/2023    Discharge date : 3/16/2023      Final Diagnoses:   Ventricular tachyarrhythmia [I47.20]  Recent cardiac arrest secondary to Vtach  Qtc prolongation  ESRD  Paroxysmal atrial fibrillation. Had been on Eliquis which was held due to low hemoglobin  Secondary hypercoagulable state due to A-fib  History of failed renal transplant  Hypothyroidism  Anemia of chronic kidney disease  Hyponatremia  Pneumonia    Reason for Hospitalization:    Patient is a 75 yo female with a PMH of ESRD, failed renal transplant, HTN who was transferred from [address] [place] [address] per cardiology on 3/9/23 due to necessary cardiac catheterization following cardiac arrest, V-tach, and QTc prolongation during dialysis. Patient was originally admitted to [address] [place] [address] Floor for nausea, vomiting and abdominal cramps possibly due to missed hemodialysis. Hospital Course:     Cardiac catheterization was completed on 3/10 and is negative for CAD showing only tortuous vessels. CXR from 3/10 shows infiltrates concerning for pneumonia. Patient completed dialysis on 3/10 and 0/63 without complications. On 3/14, patient is seen today for follow-up. Patient complains of headache and nausea, which resolved with medication. Patient stated her temperature increased to 99.7 degrees last night. No acute complaints at this time. On 3/14, labs today show sodium 132, BUN 32, creatinine 6.71. Repeat EKG shows prolonged Qtc with normal sinus rhythm. On 3/14, patient was fitted for defibrillator vest and will be discharged home with home health. On 3/15, patient is being discharged pending assignment of a dialysis site that will accept her with her LifeVest which they consider \"high risk. \"    On 3/16, discharge pending approval of a dialysis center which accepts LifeVest.  Per case management, dialysis center has been set up starting Monday. We will therefore be able to discharge her tomorrow following dialysis             Discharge Medications:   Current Discharge Medication List        START taking these medications    Details   amoxicillin-clavulanate (Augmentin) 875-125 mg per tablet Take 1 Tablet by mouth daily. Qty: 5 Tablet, Refills: 0  Start date: 3/15/2023           CONTINUE these medications which have NOT CHANGED    Details   traZODone (DESYREL) 50 mg tablet TAKE 1 TABLET BY MOUTH EVERY NIGHT FOR INSOMNIA ASSOCIATED WITH DEPRESSION  Qty: 90 Tablet, Refills: 0    Associated Diagnoses: Primary insomnia      Eliquis 2.5 mg tablet TAKE 1 TABLET TWICE DAILY  Qty: 180 Tablet, Refills: 1      losartan (COZAAR) 50 mg tablet TAKE 1 TABLET EVERY MORNING  Qty: 90 Tablet, Refills: 1    Associated Diagnoses: Hypertension, unspecified type      levothyroxine (SYNTHROID) 75 mcg tablet TAKE 1 TABLET EVERY DAY BEFORE BREAKFAST  Qty: 90 Tablet, Refills: 1      montelukast (SINGULAIR) 10 mg tablet TAKE 1 TABLET EVERY MORNING  Qty: 90 Tablet, Refills: 1      simvastatin (ZOCOR) 20 mg tablet TAKE 1 TABLET EVERY DAY AS DIRECTED  Qty: 90 Tablet, Refills: 1      carvediloL (COREG) 25 mg tablet TAKE 1 TABLET TWICE DAILY WITH MEALS  Qty: 180 Tablet, Refills: 1      Trelegy Ellipta 100-62.5-25 mcg inhaler INHALE 1 PUFF EVERY DAY. PLEASE CALL AND SCHEDULE AN APPOINTMENT WITH NEW PCP FOR FUTURE REFILLS  Qty: 60 Blister, Refills: 1      albuterol (PROVENTIL HFA, VENTOLIN HFA, PROAIR HFA) 90 mcg/actuation inhaler Take 1 Puff by inhalation every four (4) hours as needed for Wheezing. Indications: asthma attack  Qty: 18 g, Refills: 1    Associated Diagnoses: Mild persistent asthma without complication      fluticasone propionate (FLONASE) 50 mcg/actuation nasal spray USE 1 SPRAY IN EACH NOSTRIL EVERY MORNING  Qty: 16 g, Refills: 2      Dexilant 60 mg CpDB capsule (delayed release) TAKE 1 CAPSULE BY MOUTH IN THE MORNING.   Qty: 90 Capsule, Refills: 0 Associated Diagnoses: Gastroesophageal reflux disease with esophagitis without hemorrhage      polyethylene glycol (Miralax) 17 gram packet Take 1 Packet by mouth two (2) times a day. Qty: 60 Each, Refills: 2      epoetin jalen (EPOGEN;PROCRIT) 10,000 unit/mL injection 10,000 Units by SubCUTAneous route. cinacalcet (SENSIPAR) 30 mg tablet Take 30 mg by mouth daily. albuterol (PROVENTIL VENTOLIN) 2.5 mg /3 mL (0.083 %) nebu USE 1 VIAL VIA NEBULIZER THREE TIMES DAILY  Qty: 90 mL, Refills: 3    Associated Diagnoses: Mild intermittent asthma without complication      calcitRIOL (ROCALTROL) 0.5 mcg capsule Take 0.5 mcg by mouth See Admin Instructions. Take on non dialysis days (Tues, Wed, Thur, Sat, Sun)  Dialysis is on Monday and Friday      sevelamer carbonate (RENVELA) 800 mg tab tab Take 800 mg by mouth three (3) times daily. aluminum & magnesium hydroxide-simethicone (Maalox Maximum Strength) 400-400-40 mg/5 mL suspension Take 10 mL by mouth every six (6) hours as needed for Indigestion.   Qty: 250 mL, Refills: 0           STOP taking these medications       sucralfate (CARAFATE) 1 gram tablet Comments:   Reason for Stopping:         valGANciclovir (VALCYTE) 450 mg tablet Comments:   Reason for Stopping:         dicyclomine (BENTYL) 10 mg capsule Comments:   Reason for Stopping:         amLODIPine (NORVASC) 10 mg tablet Comments:   Reason for Stopping:         diclofenac (VOLTAREN) 1 % gel Comments:   Reason for Stopping:         hyoscyamine SL (LEVSIN/SL) 0.125 mg SL tablet Comments:   Reason for Stopping:         ESTRACE 0.01 % (0.1 mg/gram) vaginal cream Comments:   Reason for Stopping:         cranberry extract 425 mg cap Comments:   Reason for Stopping:         clopidogreL (PLAVIX) 75 mg tab Comments:   Reason for Stopping:         amiodarone (CORDARONE) 200 mg tablet Comments:   Reason for Stopping:         famotidine (PEPCID) 20 mg tablet Comments:   Reason for Stopping:         RenaPlex-D 800 mcg-12.5 mg -2,000 unit tab Comments:   Reason for Stopping: Follow up Care:    1. Yonathan Zhang MD in 1-2 weeks. Please call to set up an appointment shortly after discharge. Diet:  Renal Diet    Disposition:  Home. Advanced Directive:   FULL -   DNR      Discharge Exam:  General:  Alert, cooperative, no distress, appears stated age. Lungs:   Clear to auscultation bilaterally. Chest wall:  No tenderness or deformity. Heart:  Regular rate and rhythm, S1, S2 normal, no murmur, click, rub or gallop. Abdomen:   Soft, non-tender. Bowel sounds normal. No masses,  No organomegaly. Extremities: Extremities normal, atraumatic, no cyanosis or edema. Pulses: 2+ and symmetric all extremities. Skin: Skin color, texture, turgor normal. No rashes or lesions   Neurologic: CNII-XII intact. No gross sensory or motor deficits        CONSULTATIONS: Cardiology and Nephrology    Significant Diagnostic Studies:   3/9/2023: BUN 25 mg/dL (H; Ref range: 6 - 20 mg/dL); BUN 25 mg/dL (H; Ref range: 6 - 20 mg/dL); Calcium 7.1 mg/dL (L; Ref range: 8.5 - 10.1 mg/dL); Calcium 7.2 mg/dL (L; Ref range: 8.5 - 10.1 mg/dL); CO2 27 mmol/L (Ref range: 21 - 32 mmol/L); CO2 26 mmol/L (Ref range: 21 - 32 mmol/L); Creatinine 5.07 mg/dL (H; Ref range: 0.55 - 1.02 mg/dL); Creatinine 5.11 mg/dL (H; Ref range: 0.55 - 1.02 mg/dL); Glucose 149 mg/dL (H; Ref range: 65 - 100 mg/dL); Glucose 150 mg/dL (H; Ref range: 65 - 100 mg/dL); HCT 26.4 % (L; Ref range: 35.0 - 47.0 %); HGB 8.2 g/dL (L; Ref range: 11.5 - 16.0 g/dL); Potassium 3.8 mmol/L (Ref range: 3.5 - 5.1 mmol/L); Potassium 3.7 mmol/L (Ref range: 3.5 - 5.1 mmol/L); Sodium 128 mmol/L (L; Ref range: 136 - 145 mmol/L); Sodium 128 mmol/L (L; Ref range: 136 - 145 mmol/L)  3/10/2023: BUN 28 mg/dL (H; Ref range: 6 - 20 mg/dL); Calcium 7.3 mg/dL (L; Ref range: 8.5 - 10.1 mg/dL); CO2 25 mmol/L (Ref range: 21 - 32 mmol/L);  Creatinine 5.87 mg/dL (H; Ref range: 0.55 - 1.02 mg/dL); Glucose 95 mg/dL (Ref range: 65 - 100 mg/dL); HCT 25.4 % (L; Ref range: 35.0 - 47.0 %); HGB 8.1 g/dL (L; Ref range: 11.5 - 16.0 g/dL); Potassium 4.1 mmol/L (Ref range: 3.5 - 5.1 mmol/L); Sodium 129 mmol/L (L; Ref range: 136 - 145 mmol/L)  Recent Labs     03/13/23  1502   WBC 6.9   HGB 8.9*   HCT 27.4*          Recent Labs     03/14/23  1900   *   K 3.8   CL 99   CO2 26   BUN 15   CREA 4.49*   *   CA 7.2*   MG 2.0   PHOS 2.1*       Recent Labs     03/14/23  1900   ALB 1.8*       No results for input(s): INR, PTP, APTT, INREXT, INREXT in the last 72 hours. No results for input(s): FE, TIBC, PSAT, FERR in the last 72 hours. No results for input(s): PH, PCO2, PO2 in the last 72 hours. No results for input(s): CPK, CKMB in the last 72 hours. No lab exists for component: TROPONINI  Lab Results   Component Value Date/Time    Glucose (POC) 100 03/14/2023 01:55 PM    Glucose (POC) 94 03/12/2023 07:43 PM    Glucose (POC) 73 03/12/2023 12:28 PM    Glucose (POC) 104 (H) 03/11/2023 04:51 PM    Glucose (POC) 90 03/11/2023 11:15 AM       Discharge time spent 35 minutes    Signed:  Ryan Neal MD  3/16/2023  9:15 AM   *ATTENTION:  This note has been created by a medical student for educational purposes only. Please do not refer to the content of this note for clinical decision-making, billing, or other purposes. Please see attending physicians note to obtain clinical information on this patient. *

## 2023-03-16 NOTE — PROGRESS NOTES
11:14: NACHO spoke with patient's daughter, Wyatt Townsend ([phone]) to get an update on transportation to dialysis. Sohail Ariel stated that she is working with [employer] in [place], [place] to fill out paperwork, and they will have a chair for her starting on Monday. She plans to pick patient up tomorrow after dialysis. NACHO notified primary physician, Dr. Scarlett Hidalgo. 13:00: Raghav Mcintyre at [facility] requested updated chemistry, hematology, and hep b antigen labs. Labs faxed to [phone].

## 2023-03-16 NOTE — PROGRESS NOTES
Problem: Risk for Spread of Infection  Goal: Prevent transmission of infectious organism to others  Description: Prevent the transmission of infectious organisms to other patients, staff members, and visitors. Outcome: Progressing Towards Goal     Problem: Patient Education:  Go to Education Activity  Goal: Patient/Family Education  Outcome: Progressing Towards Goal     Problem: Falls - Risk of  Goal: *Absence of Falls  Description: Document Sudheer Morgan Fall Risk and appropriate interventions in the flowsheet. Outcome: Progressing Towards Goal  Note: Fall Risk Interventions:                                Problem: Patient Education: Go to Patient Education Activity  Goal: Patient/Family Education  Outcome: Progressing Towards Goal     Problem: Pressure Injury - Risk of  Goal: *Prevention of pressure injury  Description: Document Quinn Scale and appropriate interventions in the flowsheet.   Outcome: Progressing Towards Goal  Note: Pressure Injury Interventions:  Sensory Interventions: Keep linens dry and wrinkle-free    Moisture Interventions: Absorbent underpads    Activity Interventions: Pressure redistribution bed/mattress(bed type)    Mobility Interventions: Pressure redistribution bed/mattress (bed type)    Nutrition Interventions: Document food/fluid/supplement intake                     Problem: Patient Education: Go to Patient Education Activity  Goal: Patient/Family Education  Outcome: Progressing Towards Goal     Problem: Patient Education: Go to Patient Education Activity  Goal: Patient/Family Education  Outcome: Progressing Towards Goal     Problem: Patient Education: Go to Patient Education Activity  Goal: Patient/Family Education  Outcome: Progressing Towards Goal

## 2023-03-16 NOTE — MED STUDENT NOTES
Physician Discharge Summary     Patient ID:    Alan Holstein  [id number]  74 y.o.  1955    Admit date: 3/9/2023    Discharge date : 3/16/2023      Final Diagnoses:   Ventricular tachyarrhythmia [I47.20]  Recent cardiac arrest secondary to Vtach  Qtc prolongation  ESRD  Paroxysmal atrial fibrillation. Had been on Eliquis which was held due to low hemoglobin  Secondary hypercoagulable state due to A-fib  History of failed renal transplant  Hypothyroidism  Anemia of chronic kidney disease  Hyponatremia  Pneumonia    Reason for Hospitalization:    Patient is a 75 yo female with a PMH of ESRD, failed renal transplant, HTN who was transferred from [facility] per cardiology on 3/9/23 due to necessary cardiac catheterization following cardiac arrest, V-tach, and QTc prolongation during dialysis. Patient was originally admitted to [facility] for nausea, vomiting and abdominal cramps possibly due to missed hemodialysis. Hospital Course:     Cardiac catheterization was completed on 3/10 and is negative for CAD showing only tortuous vessels. CXR from 3/10 shows infiltrates concerning for pneumonia. Patient completed dialysis on 3/10 and 4/11 without complications. On 3/14, patient is seen today for follow-up. Patient complains of headache and nausea, which resolved with medication. Patient stated her temperature increased to 99.7 degrees last night. No acute complaints at this time. On 3/14, labs today show sodium 132, BUN 32, creatinine 6.71. Repeat EKG shows prolonged Qtc with normal sinus rhythm. On 3/14, patient was fitted for defibrillator vest and will be discharged home with home health. On 3/15, patient is being discharged pending assignment of a dialysis site that will accept her with her LifeVest which they consider \"high risk. \"    On 3/16, discharge pending approval of a dialysis center which accepts LifeVest.             Discharge Medications:   Current Discharge Medication List        START taking these medications    Details   amoxicillin-clavulanate (Augmentin) 875-125 mg per tablet Take 1 Tablet by mouth daily. Qty: 5 Tablet, Refills: 0  Start date: 3/15/2023           CONTINUE these medications which have NOT CHANGED    Details   traZODone (DESYREL) 50 mg tablet TAKE 1 TABLET BY MOUTH EVERY NIGHT FOR INSOMNIA ASSOCIATED WITH DEPRESSION  Qty: 90 Tablet, Refills: 0    Associated Diagnoses: Primary insomnia      Eliquis 2.5 mg tablet TAKE 1 TABLET TWICE DAILY  Qty: 180 Tablet, Refills: 1      losartan (COZAAR) 50 mg tablet TAKE 1 TABLET EVERY MORNING  Qty: 90 Tablet, Refills: 1    Associated Diagnoses: Hypertension, unspecified type      levothyroxine (SYNTHROID) 75 mcg tablet TAKE 1 TABLET EVERY DAY BEFORE BREAKFAST  Qty: 90 Tablet, Refills: 1      montelukast (SINGULAIR) 10 mg tablet TAKE 1 TABLET EVERY MORNING  Qty: 90 Tablet, Refills: 1      simvastatin (ZOCOR) 20 mg tablet TAKE 1 TABLET EVERY DAY AS DIRECTED  Qty: 90 Tablet, Refills: 1      carvediloL (COREG) 25 mg tablet TAKE 1 TABLET TWICE DAILY WITH MEALS  Qty: 180 Tablet, Refills: 1      Trelegy Ellipta 100-62.5-25 mcg inhaler INHALE 1 PUFF EVERY DAY. PLEASE CALL AND SCHEDULE AN APPOINTMENT WITH NEW PCP FOR FUTURE REFILLS  Qty: 60 Blister, Refills: 1      albuterol (PROVENTIL HFA, VENTOLIN HFA, PROAIR HFA) 90 mcg/actuation inhaler Take 1 Puff by inhalation every four (4) hours as needed for Wheezing. Indications: asthma attack  Qty: 18 g, Refills: 1    Associated Diagnoses: Mild persistent asthma without complication      fluticasone propionate (FLONASE) 50 mcg/actuation nasal spray USE 1 SPRAY IN EACH NOSTRIL EVERY MORNING  Qty: 16 g, Refills: 2      Dexilant 60 mg CpDB capsule (delayed release) TAKE 1 CAPSULE BY MOUTH IN THE MORNING.   Qty: 90 Capsule, Refills: 0    Associated Diagnoses: Gastroesophageal reflux disease with esophagitis without hemorrhage      polyethylene glycol (Miralax) 17 gram packet Take 1 Packet by mouth two (2) times a day. Qty: 60 Each, Refills: 2      epoetin jalen (EPOGEN;PROCRIT) 10,000 unit/mL injection 10,000 Units by SubCUTAneous route. cinacalcet (SENSIPAR) 30 mg tablet Take 30 mg by mouth daily. albuterol (PROVENTIL VENTOLIN) 2.5 mg /3 mL (0.083 %) nebu USE 1 VIAL VIA NEBULIZER THREE TIMES DAILY  Qty: 90 mL, Refills: 3    Associated Diagnoses: Mild intermittent asthma without complication      calcitRIOL (ROCALTROL) 0.5 mcg capsule Take 0.5 mcg by mouth See Admin Instructions. Take on non dialysis days (Tues, Wed, Thur, Sat, Sun)  Dialysis is on Monday and Friday      sevelamer carbonate (RENVELA) 800 mg tab tab Take 800 mg by mouth three (3) times daily. aluminum & magnesium hydroxide-simethicone (Maalox Maximum Strength) 400-400-40 mg/5 mL suspension Take 10 mL by mouth every six (6) hours as needed for Indigestion. Qty: 250 mL, Refills: 0           STOP taking these medications       sucralfate (CARAFATE) 1 gram tablet Comments:   Reason for Stopping:         valGANciclovir (VALCYTE) 450 mg tablet Comments:   Reason for Stopping:         dicyclomine (BENTYL) 10 mg capsule Comments:   Reason for Stopping:         amLODIPine (NORVASC) 10 mg tablet Comments:   Reason for Stopping:         diclofenac (VOLTAREN) 1 % gel Comments:   Reason for Stopping:         hyoscyamine SL (LEVSIN/SL) 0.125 mg SL tablet Comments:   Reason for Stopping:         ESTRACE 0.01 % (0.1 mg/gram) vaginal cream Comments:   Reason for Stopping:         cranberry extract 425 mg cap Comments:   Reason for Stopping:         clopidogreL (PLAVIX) 75 mg tab Comments:   Reason for Stopping:         amiodarone (CORDARONE) 200 mg tablet Comments:   Reason for Stopping:         famotidine (PEPCID) 20 mg tablet Comments:   Reason for Stopping:         RenaPlex-D 800 mcg-12.5 mg -2,000 unit tab Comments:   Reason for Stopping: Follow up Care:    1. Farrukh Saldivar MD in 1-2 weeks.   Please call to set up an appointment shortly after discharge. Diet:  Renal Diet    Disposition:  Home. Advanced Directive:   FULL -   DNR      Discharge Exam:  General:  Alert, cooperative, no distress, appears stated age. Lungs:   Clear to auscultation bilaterally. Chest wall:  No tenderness or deformity. Heart:  Regular rate and rhythm, S1, S2 normal, no murmur, click, rub or gallop. Abdomen:   Soft, non-tender. Bowel sounds normal. No masses,  No organomegaly. Extremities: Extremities normal, atraumatic, no cyanosis or edema. Pulses: 2+ and symmetric all extremities. Skin: Skin color, texture, turgor normal. No rashes or lesions   Neurologic: CNII-XII intact. No gross sensory or motor deficits        CONSULTATIONS: Cardiology and Nephrology    Significant Diagnostic Studies:   3/9/2023: BUN 25 mg/dL (H; Ref range: 6 - 20 mg/dL); BUN 25 mg/dL (H; Ref range: 6 - 20 mg/dL); Calcium 7.1 mg/dL (L; Ref range: 8.5 - 10.1 mg/dL); Calcium 7.2 mg/dL (L; Ref range: 8.5 - 10.1 mg/dL); CO2 27 mmol/L (Ref range: 21 - 32 mmol/L); CO2 26 mmol/L (Ref range: 21 - 32 mmol/L); Creatinine 5.07 mg/dL (H; Ref range: 0.55 - 1.02 mg/dL); Creatinine 5.11 mg/dL (H; Ref range: 0.55 - 1.02 mg/dL); Glucose 149 mg/dL (H; Ref range: 65 - 100 mg/dL); Glucose 150 mg/dL (H; Ref range: 65 - 100 mg/dL); HCT 26.4 % (L; Ref range: 35.0 - 47.0 %); HGB 8.2 g/dL (L; Ref range: 11.5 - 16.0 g/dL); Potassium 3.8 mmol/L (Ref range: 3.5 - 5.1 mmol/L); Potassium 3.7 mmol/L (Ref range: 3.5 - 5.1 mmol/L); Sodium 128 mmol/L (L; Ref range: 136 - 145 mmol/L); Sodium 128 mmol/L (L; Ref range: 136 - 145 mmol/L)  3/10/2023: BUN 28 mg/dL (H; Ref range: 6 - 20 mg/dL); Calcium 7.3 mg/dL (L; Ref range: 8.5 - 10.1 mg/dL); CO2 25 mmol/L (Ref range: 21 - 32 mmol/L); Creatinine 5.87 mg/dL (H; Ref range: 0.55 - 1.02 mg/dL); Glucose 95 mg/dL (Ref range: 65 - 100 mg/dL); HCT 25.4 % (L; Ref range: 35.0 - 47.0 %);  HGB 8.1 g/dL (L; Ref range: 11.5 - 16.0 g/dL); Potassium 4.1 mmol/L (Ref range: 3.5 - 5.1 mmol/L); Sodium 129 mmol/L (L; Ref range: 136 - 145 mmol/L)  Recent Labs     03/13/23  1502   WBC 6.9   HGB 8.9*   HCT 27.4*        Recent Labs     03/14/23  1900   *   K 3.8   CL 99   CO2 26   BUN 15   CREA 4.49*   *   CA 7.2*   MG 2.0   PHOS 2.1*     Recent Labs     03/14/23  1900   ALB 1.8*     No results for input(s): INR, PTP, APTT, INREXT in the last 72 hours. No results for input(s): FE, TIBC, PSAT, FERR in the last 72 hours. No results for input(s): PH, PCO2, PO2 in the last 72 hours. No results for input(s): CPK, CKMB in the last 72 hours. No lab exists for component: TROPONINI  Lab Results   Component Value Date/Time    Glucose (POC) 100 03/14/2023 01:55 PM    Glucose (POC) 94 03/12/2023 07:43 PM    Glucose (POC) 73 03/12/2023 12:28 PM    Glucose (POC) 104 (H) 03/11/2023 04:51 PM    Glucose (POC) 90 03/11/2023 11:15 AM       Discharge time spent 35 minutes    Signed:  Gogo Lima  3/16/2023  9:15 AM   *ATTENTION:  This note has been created by a medical student for educational purposes only. Please do not refer to the content of this note for clinical decision-making, billing, or other purposes. Please see attending physicians note to obtain clinical information on this patient. *

## 2023-03-16 NOTE — PROGRESS NOTES
CARDIOLOGY PROGRESS NOTE     Patient seen and examined. This is a patient who is followed for VT/cardiac arrest and prolonged QTc. She was transferred from [facility] for cardiac cath to rule out CAD as etiology for ventricular arrhythmia. Cath negative for CAD showing only tortuous vessels. Patient seen and examined. Resting in bed, family at the bedside. Wearable defibrillator being fitted. No chest pain or shortness of breath. No lightheadedness or palpitations. No other complaints reported. Telemetry reviewed. Pertinent review of systems items noted above, all other systems are negative. Current medications reviewed. Physical Examination  Visit Vitals  BP (!) 140/65 (BP Patient Position: At rest;Semi fowlers)   Pulse 71   Temp 98.6 °F (37 °C)   Resp 18   Ht 5' 0.98\" (1.549 m)   Wt 63.1 kg (139 lb 1.8 oz)   SpO2 98%   BMI 26.30 kg/m²       Vital signs are stable. No apparent distress. Heart is regular, rate and rhythm. Normal S1, S2, no murmurs are appreciated. Lungs are clear bilaterally. Abdomen is soft, nontender, normal bowel sounds. Extremities have no edema. Labs reviewed. Case discussed with Dr. Pooja Trujillo and our impression and recommendations are as follows:  1) VT cardiac arrest: Sustained VT. Was transferred from outside hospital for cardiac catheterization. Troponins were low level. No chest pain. Cath negative. Continue carvedilol and losartan (per renal). Lytes were normal and at the time of Qtc prolonging up to the 700ms. Continue to monitor Qtc with serial EKGs. She had been on amiodarone long term previously for atrial fibrillation management and remained on at the time of cardiac arrest.  EP following, will consider ICD in the future given ongoing infection at present. Fitted with wearable defibrillator for prevention of SCD today prior to discharge. 2) Acute blood loss anemia: HGB down to 5.7 on 3/7. Most recently she has trended in the 8-9 range.   DOAC discussed below. 3) ESRD on HD: Nephrology following     4) pAfib: Now in NSR. She had previously been followed by [facility] cardiology and was on long term amiodarone 200mg daily and Eliquis 2.5mg twice daily. Eliquis held following Hgb of 5.7. GI saw her at [facility] and had no plans for intervention. When safe to resume she should be on 5mg twice daily per dosing guidelines. No further amiodarone given prolonged Qtc. Will sign off at this time, follow up in [place] office in 2 weeks. Please do not hesitate to call me or Dr. Roel Patel if additional questions arise.

## 2023-03-17 VITALS
TEMPERATURE: 98.4 F | OXYGEN SATURATION: 98 % | WEIGHT: 139.11 LBS | HEART RATE: 82 BPM | SYSTOLIC BLOOD PRESSURE: 121 MMHG | DIASTOLIC BLOOD PRESSURE: 70 MMHG | RESPIRATION RATE: 18 BRPM | HEIGHT: 61 IN | BODY MASS INDEX: 26.26 KG/M2

## 2023-03-17 PROCEDURE — 74011250637 HC RX REV CODE- 250/637: Performed by: INTERNAL MEDICINE

## 2023-03-17 PROCEDURE — 74011250636 HC RX REV CODE- 250/636: Performed by: INTERNAL MEDICINE

## 2023-03-17 PROCEDURE — 90935 HEMODIALYSIS ONE EVALUATION: CPT

## 2023-03-17 PROCEDURE — 74011000250 HC RX REV CODE- 250: Performed by: INTERNAL MEDICINE

## 2023-03-17 PROCEDURE — 74011000258 HC RX REV CODE- 258: Performed by: INTERNAL MEDICINE

## 2023-03-17 RX ADMIN — SODIUM CHLORIDE, PRESERVATIVE FREE 10 ML: 5 INJECTION INTRAVENOUS at 06:12

## 2023-03-17 RX ADMIN — PIPERACILLIN AND TAZOBACTAM 3.38 G: 3; .375 INJECTION, POWDER, FOR SOLUTION INTRAVENOUS at 00:22

## 2023-03-17 RX ADMIN — ACETAMINOPHEN 650 MG: 325 TABLET ORAL at 06:47

## 2023-03-17 RX ADMIN — EPOETIN ALFA-EPBX 8000 UNITS: 4000 INJECTION, SOLUTION INTRAVENOUS; SUBCUTANEOUS at 10:43

## 2023-03-17 RX ADMIN — PANTOPRAZOLE SODIUM 40 MG: 40 TABLET, DELAYED RELEASE ORAL at 06:42

## 2023-03-17 RX ADMIN — LEVOTHYROXINE SODIUM 100 MCG: 0.1 TABLET ORAL at 06:42

## 2023-03-17 NOTE — DISCHARGE SUMMARY
Physician Discharge Summary     Patient ID:    Harsha West  [id number]  74 y.o.  1955    Admit date: 3/9/2023    Discharge date : 3/17/2023      Final Diagnoses:   Ventricular tachyarrhythmia [I47.20]  Recent cardiac arrest secondary to Vtach  Qtc prolongation  ESRD  Paroxysmal atrial fibrillation. Had been on Eliquis which was held due to low hemoglobin  Secondary hypercoagulable state due to A-fib  History of failed renal transplant  Hypothyroidism  Anemia of chronic kidney disease  Hyponatremia  Pneumonia    Reason for Hospitalization:    Patient is a 75 yo female with a PMH of ESRD, failed renal transplant, HTN who was transferred from [facility] per cardiology on 3/9/23 due to necessary cardiac catheterization following cardiac arrest, V-tach, and QTc prolongation during dialysis. Patient was originally admitted to [facility] for nausea, vomiting and abdominal cramps possibly due to missed hemodialysis. Hospital Course:     Cardiac catheterization was completed on 3/10 and is negative for CAD showing only tortuous vessels. CXR from 3/10 shows infiltrates concerning for pneumonia. Patient completed dialysis on 3/10 and 8/82 without complications. On 3/14, patient is seen today for follow-up. Patient complains of headache and nausea, which resolved with medication. Patient stated her temperature increased to 99.7 degrees last night. No acute complaints at this time. On 3/14, labs today show sodium 132, BUN 32, creatinine 6.71. Repeat EKG shows prolonged Qtc with normal sinus rhythm. On 3/14, patient was fitted for defibrillator vest and will be discharged home with home health. On 3/15, patient is being discharged pending assignment of a dialysis site that will accept her with her LifeVest which they consider \"high risk. \"    On 3/16, discharge pending approval of a dialysis center which accepts LifeVest.  Per case management, dialysis center has been set up starting Monday. We will therefore be able to discharge her tomorrow following dialysis     3/17 plan is for discharge home. New dialysis center set up starting Monday        Discharge Medications:   Current Discharge Medication List        START taking these medications    Details   amoxicillin-clavulanate (Augmentin) 875-125 mg per tablet Take 1 Tablet by mouth daily. Qty: 5 Tablet, Refills: 0  Start date: 3/15/2023           CONTINUE these medications which have NOT CHANGED    Details   traZODone (DESYREL) 50 mg tablet TAKE 1 TABLET BY MOUTH EVERY NIGHT FOR INSOMNIA ASSOCIATED WITH DEPRESSION  Qty: 90 Tablet, Refills: 0    Associated Diagnoses: Primary insomnia      Eliquis 2.5 mg tablet TAKE 1 TABLET TWICE DAILY  Qty: 180 Tablet, Refills: 1      losartan (COZAAR) 50 mg tablet TAKE 1 TABLET EVERY MORNING  Qty: 90 Tablet, Refills: 1    Associated Diagnoses: Hypertension, unspecified type      levothyroxine (SYNTHROID) 75 mcg tablet TAKE 1 TABLET EVERY DAY BEFORE BREAKFAST  Qty: 90 Tablet, Refills: 1      montelukast (SINGULAIR) 10 mg tablet TAKE 1 TABLET EVERY MORNING  Qty: 90 Tablet, Refills: 1      simvastatin (ZOCOR) 20 mg tablet TAKE 1 TABLET EVERY DAY AS DIRECTED  Qty: 90 Tablet, Refills: 1      carvediloL (COREG) 25 mg tablet TAKE 1 TABLET TWICE DAILY WITH MEALS  Qty: 180 Tablet, Refills: 1      Trelegy Ellipta 100-62.5-25 mcg inhaler INHALE 1 PUFF EVERY DAY. PLEASE CALL AND SCHEDULE AN APPOINTMENT WITH NEW PCP FOR FUTURE REFILLS  Qty: 60 Blister, Refills: 1      albuterol (PROVENTIL HFA, VENTOLIN HFA, PROAIR HFA) 90 mcg/actuation inhaler Take 1 Puff by inhalation every four (4) hours as needed for Wheezing.  Indications: asthma attack  Qty: 18 g, Refills: 1    Associated Diagnoses: Mild persistent asthma without complication      fluticasone propionate (FLONASE) 50 mcg/actuation nasal spray USE 1 SPRAY IN EACH NOSTRIL EVERY MORNING  Qty: 16 g, Refills: 2      Dexilant 60 mg CpDB capsule (delayed release) TAKE 1 CAPSULE BY MOUTH IN THE MORNING. Qty: 90 Capsule, Refills: 0    Associated Diagnoses: Gastroesophageal reflux disease with esophagitis without hemorrhage      polyethylene glycol (Miralax) 17 gram packet Take 1 Packet by mouth two (2) times a day. Qty: 60 Each, Refills: 2      epoetin jalen (EPOGEN;PROCRIT) 10,000 unit/mL injection 10,000 Units by SubCUTAneous route. cinacalcet (SENSIPAR) 30 mg tablet Take 30 mg by mouth daily. albuterol (PROVENTIL VENTOLIN) 2.5 mg /3 mL (0.083 %) nebu USE 1 VIAL VIA NEBULIZER THREE TIMES DAILY  Qty: 90 mL, Refills: 3    Associated Diagnoses: Mild intermittent asthma without complication      calcitRIOL (ROCALTROL) 0.5 mcg capsule Take 0.5 mcg by mouth See Admin Instructions. Take on non dialysis days (Tues, Wed, Thur, Sat, Sun)  Dialysis is on Monday and Friday      sevelamer carbonate (RENVELA) 800 mg tab tab Take 800 mg by mouth three (3) times daily. aluminum & magnesium hydroxide-simethicone (Maalox Maximum Strength) 400-400-40 mg/5 mL suspension Take 10 mL by mouth every six (6) hours as needed for Indigestion.   Qty: 250 mL, Refills: 0           STOP taking these medications       sucralfate (CARAFATE) 1 gram tablet Comments:   Reason for Stopping:         valGANciclovir (VALCYTE) 450 mg tablet Comments:   Reason for Stopping:         dicyclomine (BENTYL) 10 mg capsule Comments:   Reason for Stopping:         amLODIPine (NORVASC) 10 mg tablet Comments:   Reason for Stopping:         diclofenac (VOLTAREN) 1 % gel Comments:   Reason for Stopping:         hyoscyamine SL (LEVSIN/SL) 0.125 mg SL tablet Comments:   Reason for Stopping:         ESTRACE 0.01 % (0.1 mg/gram) vaginal cream Comments:   Reason for Stopping:         cranberry extract 425 mg cap Comments:   Reason for Stopping:         clopidogreL (PLAVIX) 75 mg tab Comments:   Reason for Stopping:         amiodarone (CORDARONE) 200 mg tablet Comments:   Reason for Stopping:         famotidine (PEPCID) 20 mg tablet Comments:   Reason for Stopping:         RenaPlex-D 800 mcg-12.5 mg -2,000 unit tab Comments:   Reason for Stopping: Follow up Care:    1. Andreia Moser MD in 1-2 weeks. Please call to set up an appointment shortly after discharge. Diet:  Renal Diet    Disposition:  Home. Advanced Directive:   FULL -   DNR      Discharge Exam:  General:  Alert, cooperative, no distress, appears stated age. Lungs:   Clear to auscultation bilaterally. Chest wall:  No tenderness or deformity. Heart:  Regular rate and rhythm, S1, S2 normal, no murmur, click, rub or gallop. Abdomen:   Soft, non-tender. Bowel sounds normal. No masses,  No organomegaly. Extremities: Extremities normal, atraumatic, no cyanosis or edema. Pulses: 2+ and symmetric all extremities. Skin: Skin color, texture, turgor normal. No rashes or lesions   Neurologic: CNII-XII intact. No gross sensory or motor deficits        CONSULTATIONS: Cardiology and Nephrology    Significant Diagnostic Studies:   3/9/2023: BUN 25 mg/dL (H; Ref range: 6 - 20 mg/dL); BUN 25 mg/dL (H; Ref range: 6 - 20 mg/dL); Calcium 7.1 mg/dL (L; Ref range: 8.5 - 10.1 mg/dL); Calcium 7.2 mg/dL (L; Ref range: 8.5 - 10.1 mg/dL); CO2 27 mmol/L (Ref range: 21 - 32 mmol/L); CO2 26 mmol/L (Ref range: 21 - 32 mmol/L); Creatinine 5.07 mg/dL (H; Ref range: 0.55 - 1.02 mg/dL); Creatinine 5.11 mg/dL (H; Ref range: 0.55 - 1.02 mg/dL); Glucose 149 mg/dL (H; Ref range: 65 - 100 mg/dL); Glucose 150 mg/dL (H; Ref range: 65 - 100 mg/dL); HCT 26.4 % (L; Ref range: 35.0 - 47.0 %); HGB 8.2 g/dL (L; Ref range: 11.5 - 16.0 g/dL); Potassium 3.8 mmol/L (Ref range: 3.5 - 5.1 mmol/L); Potassium 3.7 mmol/L (Ref range: 3.5 - 5.1 mmol/L); Sodium 128 mmol/L (L; Ref range: 136 - 145 mmol/L); Sodium 128 mmol/L (L; Ref range: 136 - 145 mmol/L)  3/10/2023: BUN 28 mg/dL (H; Ref range: 6 - 20 mg/dL);  Calcium 7.3 mg/dL (L; Ref range: 8.5 - 10.1 mg/dL); CO2 25 mmol/L (Ref range: 21 - 32 mmol/L); Creatinine 5.87 mg/dL (H; Ref range: 0.55 - 1.02 mg/dL); Glucose 95 mg/dL (Ref range: 65 - 100 mg/dL); HCT 25.4 % (L; Ref range: 35.0 - 47.0 %); HGB 8.1 g/dL (L; Ref range: 11.5 - 16.0 g/dL); Potassium 4.1 mmol/L (Ref range: 3.5 - 5.1 mmol/L); Sodium 129 mmol/L (L; Ref range: 136 - 145 mmol/L)  No results for input(s): WBC, HGB, HCT, PLT, HGBEXT, HCTEXT, PLTEXT, HGBEXT, HCTEXT, PLTEXT in the last 72 hours. Recent Labs     03/14/23  1900   *   K 3.8   CL 99   CO2 26   BUN 15   CREA 4.49*   *   CA 7.2*   MG 2.0   PHOS 2.1*       Recent Labs     03/14/23  1900   ALB 1.8*       No results for input(s): INR, PTP, APTT, INREXT, INREXT in the last 72 hours. No results for input(s): FE, TIBC, PSAT, FERR in the last 72 hours. No results for input(s): PH, PCO2, PO2 in the last 72 hours. No results for input(s): CPK, CKMB in the last 72 hours.     No lab exists for component: TROPONINI  Lab Results   Component Value Date/Time    Glucose (POC) 100 03/14/2023 01:55 PM    Glucose (POC) 94 03/12/2023 07:43 PM    Glucose (POC) 73 03/12/2023 12:28 PM    Glucose (POC) 104 (H) 03/11/2023 04:51 PM    Glucose (POC) 90 03/11/2023 11:15 AM       Discharge time spent 35 minutes    Signed:  Sylvia Maldonado MD  3/17/2023  9:15 AM

## 2023-03-17 NOTE — PROGRESS NOTES
[facility] KIDNEY     Renal Daily Progress Note:     Admission Date: 3/9/2023     Subjective: She was seen on dialysis. Tolerating treatment. Awake and alert. Not short of breath, no chest pain. Able to eat. No nausea or vomiting. No lower extremity edema. Review of Systems  Pertinent items are noted in HPI.     Objective:     Visit Vitals  BP (!) 147/81 (BP 1 Location: Right upper arm)   Pulse 71   Temp 98.4 °F (36.9 °C) (Oral)   Resp 12   Ht 5' 0.98\" (1.549 m)   Wt 63.1 kg (139 lb 1.8 oz)   SpO2 98%   BMI 26.30 kg/m²     Temp (24hrs), Av.3 °F (36.8 °C), Min:97.9 °F (36.6 °C), Max:98.6 °F (37 °C)      No intake or output data in the 24 hours ending 23 1034    Current Facility-Administered Medications   Medication Dose Route Frequency    traZODone (DESYREL) tablet 50 mg  50 mg Oral QHS    lidocaine 4 % patch 1 Patch  1 Patch TransDERmal Q24H    phenol throat spray (CHLORASEPTIC) 1 Spray  1 Spray Oral PRN    traMADoL (ULTRAM) tablet 25 mg  25 mg Oral Q6H PRN    piperacillin-tazobactam (ZOSYN) 3.375 g in 0.9% sodium chloride (MBP/ADV) 100 mL MBP  3.375 g IntraVENous Q12H    diphenhydrAMINE (BENADRYL) capsule 25 mg  25 mg Oral Q6H PRN    epoetin jalen-epbx (RETACRIT) injection 8,000 Units  8,000 Units IntraVENous Q MON, WED & FRI    B complex-vitaminC-folic acid (NEPHROCAP) cap  1 Capsule Oral DAILY    albuterol (PROVENTIL HFA, VENTOLIN HFA, PROAIR HFA) inhaler 1 Puff  1 Puff Inhalation Q4H PRN    alum-mag hydroxide-simeth (MYLANTA) oral suspension 10 mL  10 mL Oral Q6H PRN    calcitRIOL (ROCALTROL) capsule 0.5 mcg  0.5 mcg Oral Once per day on Sun Tue Wed Th Sat    carvediloL (COREG) tablet 25 mg  25 mg Oral BID WITH MEALS    [Held by provider] cinacalcet (SENSIPAR) tablet 30 mg  30 mg Oral DAILY    famotidine (PEPCID) tablet 20 mg  20 mg Oral DAILY    fluticasone propionate (FLONASE) 50 mcg/actuation nasal spray 2 Spray  2 Spray Both Nostrils DAILY    losartan (COZAAR) tablet 50 mg  50 mg Oral DAILY    montelukast (SINGULAIR) tablet 10 mg  10 mg Oral DAILY    [Held by provider] sevelamer carbonate (RENVELA) tab 800 mg  800 mg Oral TID    budesonide-formoteroL (SYMBICORT) 160-4.5 mcg/actuation HFA inhaler 2 Puff  2 Puff Inhalation BID RT    And    tiotropium bromide (SPIRIVA RESPIMAT) 2.5 mcg /actuation  2 Puff Inhalation DAILY    sodium chloride (NS) flush 5-40 mL  5-40 mL IntraVENous Q8H    sodium chloride (NS) flush 5-40 mL  5-40 mL IntraVENous PRN    acetaminophen (TYLENOL) tablet 650 mg  650 mg Oral Q6H PRN    Or    acetaminophen (TYLENOL) suppository 650 mg  650 mg Rectal Q6H PRN    polyethylene glycol (MIRALAX) packet 17 g  17 g Oral DAILY PRN    dextrose 10% infusion 250 mL  250 mL IntraVENous PRN    levothyroxine (SYNTHROID) tablet 100 mcg  100 mcg Oral ACB    promethazine (PHENERGAN) 12.5 mg in 0.9% sodium chloride 50 mL IVPB  12.5 mg IntraVENous Q4H PRN    megestroL (MEGACE) 400 mg/10 mL (10 mL) oral suspension 200 mg  200 mg Oral DAILY    pantoprazole (PROTONIX) tablet 40 mg  40 mg Oral ACB       Physical Exam:Head: Normocephalic, without obvious abnormality, atraumatic  Neck: supple, symmetrical, trachea midline, no adenopathy, thyroid: not enlarged, symmetric, no tenderness/mass/nodules, and no JVD  Lungs: clear to auscultation bilaterally  Heart: regular rate and rhythm, no S3 or S4  Abdomen: soft, non-tender. Bowel sounds normal. No masses,  no organomegaly  Extremities: no edema  Neurologic: Grossly normal    Data Review:     LABS:  Recent Labs     03/14/23  1900   *   K 3.8   CL 99   CO2 26   BUN 15   CREA 4.49*   CA 7.2*   ALB 1.8*   PHOS 2.1*   MG 2.0       No results for input(s): WBC, HGB, HCT, PLT, HGBEXT, HCTEXT, PLTEXT, HGBEXT, HCTEXT, PLTEXT in the last 72 hours. No results for input(s): AKBAR, KU, CLU, CREAU in the last 72 hours.     No lab exists for component: PROU    Assessment:   Renal Specific Problems  CKD 6  cardiomyopathy  History of atrial fibrillation  History of cardiac arrest  Hypoalbuminemia    Plan:     Obtain/ Order: labs/cultures/radiology/procedures:      Therapeutic:    Dialysis ongoing and then discharge afterwards           Katelynn Means MD    [phone]

## 2023-03-17 NOTE — PROGRESS NOTES
Problem: Risk for Spread of Infection  Goal: Prevent transmission of infectious organism to others  Description: Prevent the transmission of infectious organisms to other patients, staff members, and visitors. Outcome: Progressing Towards Goal     Problem: Patient Education:  Go to Education Activity  Goal: Patient/Family Education  Outcome: Progressing Towards Goal     Problem: Falls - Risk of  Goal: *Absence of Falls  Description: Document Mona Nava Fall Risk and appropriate interventions in the flowsheet. Outcome: Progressing Towards Goal  Note: Fall Risk Interventions:                                Problem: Patient Education: Go to Patient Education Activity  Goal: Patient/Family Education  Outcome: Progressing Towards Goal     Problem: Pressure Injury - Risk of  Goal: *Prevention of pressure injury  Description: Document Quinn Scale and appropriate interventions in the flowsheet.   Outcome: Progressing Towards Goal  Note: Pressure Injury Interventions:  Sensory Interventions: Keep linens dry and wrinkle-free    Moisture Interventions: Absorbent underpads    Activity Interventions: PT/OT evaluation    Mobility Interventions: PT/OT evaluation    Nutrition Interventions: Document food/fluid/supplement intake                     Problem: Patient Education: Go to Patient Education Activity  Goal: Patient/Family Education  Outcome: Progressing Towards Goal     Problem: Patient Education: Go to Patient Education Activity  Goal: Patient/Family Education  Outcome: Progressing Towards Goal     Problem: Patient Education: Go to Patient Education Activity  Goal: Patient/Family Education  Outcome: Progressing Towards Goal

## 2023-03-17 NOTE — PROGRESS NOTES
VSS. Patient given discharge instructions. Medications and follow-up appointments reviewed. IV and Telemetry removed. Case management, provider, and primary nurse aware of discharge. Discharge plan of care/case management plan validated with provider discharge order. Patient getting dressed. Primary nurse aware.

## 2023-03-17 NOTE — PROGRESS NOTES
Patient was discharged before CM was able to issue 2nd IMM.      Per patient's primary physician, Dr. Brielle Villalta, patient is stable to dialyze at outpatient facility with life vest.

## 2023-03-18 LAB
BACTERIA SPEC CULT: NORMAL
SPECIAL REQUESTS,SREQ: NORMAL

## 2023-03-20 ENCOUNTER — PATIENT OUTREACH (OUTPATIENT)
Dept: CASE MANAGEMENT | Age: 68
End: 2023-03-20

## 2023-03-22 ENCOUNTER — OFFICE VISIT (OUTPATIENT)
Facility: CLINIC | Age: 68
End: 2023-03-22

## 2023-03-22 ENCOUNTER — TRANSCRIBE ORDERS (OUTPATIENT)
Facility: HOSPITAL | Age: 68
End: 2023-03-22

## 2023-03-22 VITALS
TEMPERATURE: 97 F | BODY MASS INDEX: 26.24 KG/M2 | DIASTOLIC BLOOD PRESSURE: 69 MMHG | HEART RATE: 68 BPM | OXYGEN SATURATION: 100 % | SYSTOLIC BLOOD PRESSURE: 154 MMHG | WEIGHT: 139 LBS | HEIGHT: 61 IN

## 2023-03-22 DIAGNOSIS — R06.02 SHORTNESS OF BREATH: Primary | ICD-10-CM

## 2023-03-22 DIAGNOSIS — Z09 HOSPITAL DISCHARGE FOLLOW-UP: Primary | ICD-10-CM

## 2023-03-22 DIAGNOSIS — I10 ESSENTIAL HYPERTENSION: ICD-10-CM

## 2023-03-22 DIAGNOSIS — I46.9 CARDIAC ARREST (HCC): ICD-10-CM

## 2023-03-22 DIAGNOSIS — B37.0 THRUSH, ORAL: ICD-10-CM

## 2023-03-22 DIAGNOSIS — J18.9 COMMUNITY ACQUIRED PNEUMONIA, UNSPECIFIED LATERALITY: ICD-10-CM

## 2023-03-22 RX ORDER — ESCITALOPRAM OXALATE 10 MG/1
1 TABLET ORAL
COMMUNITY
Start: 2022-03-16

## 2023-03-22 RX ORDER — B,C/FOLIC/ZINC/SELENOMETH/D3/E 0.8-12.5MG
1 TABLET ORAL DAILY
COMMUNITY
Start: 2023-02-21 | End: 2023-03-22

## 2023-03-22 ASSESSMENT — PATIENT HEALTH QUESTIONNAIRE - PHQ9
SUM OF ALL RESPONSES TO PHQ QUESTIONS 1-9: 1
SUM OF ALL RESPONSES TO PHQ QUESTIONS 1-9: 1
1. LITTLE INTEREST OR PLEASURE IN DOING THINGS: 0
SUM OF ALL RESPONSES TO PHQ9 QUESTIONS 1 & 2: 1
2. FEELING DOWN, DEPRESSED OR HOPELESS: 1
SUM OF ALL RESPONSES TO PHQ QUESTIONS 1-9: 1
SUM OF ALL RESPONSES TO PHQ QUESTIONS 1-9: 1

## 2023-03-22 NOTE — PROGRESS NOTES
Marcia Waters presents today for   Chief Complaint   Patient presents with    Follow-Up from Hospital       Is someone accompanying this pt? Yes  Susan Sierra     Is the patient using any DME equipment during OV? No     Health Maintenance reviewed and discussed and ordered per Provider. Health Maintenance Due   Topic Date Due    HIV screen  Never done    Hepatitis C screen  Never done    DTaP/Tdap/Td vaccine (1 - Tdap) Never done    Shingles vaccine (1 of 2) Never done    COVID-19 Vaccine (4 - Booster for Pfizer series) 05/18/2022    Lipids  01/20/2023    Annual Wellness Visit (AWV)  01/21/2023   . Coordination of Care:  1. \"Have you been to the ER, urgent care clinic since your last visit? Hospitalized since your last visit? \" Yes [facility]     2. \"Have you seen or consulted any other health care providers outside of the [address] [place] [address] since your last visit? \" Yes [facility] cardiology     3. For patients aged 39-70: Has the patient had a colonoscopy? Yes- No care gap present    If the patient is female:    4. For patients aged 41-77: Has the patient had a mammogram within the past 2 years? Yes- No care gap present    5. For patients aged 21-65: Has the patient had a pap smear?  Yes- No care gap present
normocephalic without evidence of trauma or deformity. Eyes:  The conjunctiva are clear and noninjected. CV:  The heart sounds are regular in rate and rhythm. There is a normal S1 and S2. There or no murmurs, rubs, or gallops. Lungs: Inspiratory and expiratory efforts are full and unlabored. Lung sounds are clear and equal to auscultation throughout all lung fields without wheezing, rales, or rhonchi. Skin:  No rashes, no jaundice, cap refill <2 sec. ASSESSMENT / [facility] discharge follow-up  Comments:  Improved since hospital discharge. Is wearing LifeVest today. Has follow-up with cardiology  Cardiac arrest [facility])  Comments:  Status postcardiac arrest x2. Had CPR in had a shockable rhythm the second event. She has a LifeVest on now. Planning on doing AICD after pneumonia treatment  Essential hypertension  Comments:  Uncontrolled. Will defer to cardiology for further management  Thrush, oral  Comments:  Recurrent issue for her. Will provide nystatin  Orders:  -     nystatin (MYCOSTATIN) 121323 UNIT/ML suspension; Take 5 mLs by mouth 4 times daily for 10 days Retain in mouth as long as possible, Oral, 4 TIMES DAILY Starting Wed 3/22/2023, Until Sat 4/1/2023, For 10 days, Disp-200 mL, R-0, Normal       Recommended follow up appointments:  - follow up with cardiology; would talk to them about cardiac rehab    DME: [facility]: yes    All medications were reconciled from hospital discharge.

## 2023-03-24 RX ORDER — FLUTICASONE PROPIONATE 50 MCG
SPRAY, SUSPENSION (ML) NASAL
Qty: 32 G | Refills: 0 | Status: SHIPPED | OUTPATIENT
Start: 2023-03-24

## 2023-03-25 NOTE — PROGRESS NOTES
Progress Note    Patient: Scot Milian MRN: [id number]  SSN: [id number]    YOB: 1955  Age: 74 y.o. Sex: female      Admit Date: 3/9/2023    LOS: 8 days     Subjective:     74 y.o. female with PMH of ESRD, failed renal transplant, arrhythmia (?atrial fibrillation), hypertension. She was admitted to [facility] with chief complaint of nausea, vomiting and  abdominal cramps, presumably due to missed hemodialysis. Otherwise limited history obtained from there as patient is withdrawn. While undergoing hemodialysis, patient had cardiac arrest V-tach and Qtc prolongation and intubated. She was subsequently extubated on 3/8. Patient is then transferred to [address] [place] [address] per cardiology as she will need cardiac cath. On my evaluation, patient reports no active complaints. Specifically no chest pain, shortness of breath abdominal pain nausea and vomiting. CXR possibly shows infiltrates concerning for pneumonia. Await EP on monday    Review of Systems:  Constitutional:  denies weight loss, no fever, no chills, no night sweats  Eye: No recent visual disturbances, no discharge, no double vision  Ear/nose/mouth/throat : No hearing disturbance, no ear pain, no nasal congestion, no sore throat, no trouble swallowing. Respiratory : No trouble breathing, no cough, no shortness of breath, no hemoptysis, no wheezing  Cardiovascular : No chest pain, no palpitation, no racing of heart, no orthopnea, no paroxysmal nocturnal dyspnea, no peripheral edema  Gastrointestinal : No nausea, no vomiting, no diarrhea, constipation, heartburn, abdominal pain  Genitourinary : No dysuria, no hematuria, no increased frequency, incontinence,  Lymphatics : No swollen glands -Neck, axillary, inguinal  Endocrine : No excessive thirst, no polyuria no cold intolerance, no heat intolerance. Immunologic : No hives, urticaria, no seasonal allergies,   Musculoskeletal : Left upper back pain.   No joint swelling, pain, effusion,  no neck pain, Integumentary : No rash, no pruritus, no ecchymosis  Hematology : No petechiae, No easy bruising,  No tendency to bleed easy  Neurology : Denies change in mental status, no abnormal balance, no headache, no confusion, numbness, tingling,  Psychiatric : No mood swings, no anxiety, depression      Objective:     Vitals:    03/17/23 0930 03/17/23 1000 03/17/23 1104 03/17/23 1134   BP: 131/60 131/60 138/64 121/70   Pulse: 74 74  82   Resp:    18   Temp:   98 °F (36.7 °C) 98.4 °F (36.9 °C)   TempSrc:       SpO2:    98%   Weight:       Height:            Intake and Output:  Current Shift: No intake/output data recorded. Last three shifts: No intake/output data recorded. Physical Exam:   General: alert, not cooperative, no distress  Eye: conjunctivae/corneas clear. PERRL, EOM's intact. Throat and Neck: normal and no erythema or exudates noted. No mass   Lung: clear to auscultation bilaterally  Heart: regular rate and rhythm,   Abdomen: soft, non-tender. Bowel sounds normal. No masses,  Extremities: No LE edema. able to move all extremities normal, atraumatic  Skin: Normal.  Neurologic: Motor function and sensation grossly intact. Psychiatric: withdrawn         Lab/Data Review:  No results found for this or any previous visit (from the past 24 hour(s)). Assessment and plan:      (1) VT cardriac arrest: EP to see on Monday for ICD    (2) ESRD on HD: nephrology    (3) pAFIB: on coreg    (4) SIRS with probable aspiration : will continue zosyn. Repeat CRP and procal     (5) Anemia : complicates all aspects of care. Possibly AOCI. Holding AC. (6) HTN : resume home meds    (7) hyponatremia : stable    DVT ppx: SCDs    DISPO: Monday or Tuesday after EP evaluation.      Signed By: Esther German MD     March 25, 2023

## 2023-03-27 ENCOUNTER — PATIENT OUTREACH (OUTPATIENT)
Dept: CASE MANAGEMENT | Age: 68
End: 2023-03-27

## 2023-03-27 NOTE — PROGRESS NOTES
Care Transitions Follow Up Call    Patient Current Location: [facility]    Challenges to be reviewed by the provider   Additional needs identified to be addressed with provider:   M/F Gretchen Peng 75 chair time           Care Transition Nurse (CTN) contacted the  daughter, Meron Samano  by telephone to follow up after admission on 3/9/2023. Addressed changes since last contact: none  Follow up appointment completed? yes. Was follow up appointment scheduled within 7 days of discharge? yes. CTN reviewed medical action plan and red flags with family and discussed any barriers to care and/or understanding of plan of care after discharge. Discussed appropriate site of care based on symptoms and resources available to patient including: PCP and Specialist. The family agrees to contact the PCP office for questions related to their healthcare. Patients top risk factors for readmission: medical condition-vtach    Interventions to address risk factors: Scheduled appointment with Specialist-stress test 3/29    [facility][1] follow up appointment(s): No future appointments. Non-[facility] follow up appointment(s): 4/27 [facility]    CTN provided contact information for future needs. Plan for follow-up call in 10-14 days based on severity of symptoms and risk factors. Plan for next call:  stress test       Goals Addressed                   This Visit's Progress     Prevent complications post hospitalization.    On track     03/20/23  Absence of falls  Will report compliance with lifevest  Will attend follow up appt with Dr. Lucia West (enma) scheduled 3/21 and Dr. Fawn Vigil scheduled 3/23  Will complete abx therapy    03/27/23  No falls  Reports compliance with lifevest  Attended fu appt with enma/PCP  Completed abx therapy  Will attend stress test scheduled 3/29  Eliquis resumed  Will attend fu appt with enma on 4/27

## 2023-03-28 LAB
EKG ATRIAL RATE: 85 BPM
EKG ATRIAL RATE: 94 BPM
EKG DIAGNOSIS: NORMAL
EKG P AXIS: 54 DEGREES
EKG P AXIS: 61 DEGREES
EKG P-R INTERVAL: 184 MS
EKG P-R INTERVAL: 190 MS
EKG Q-T INTERVAL: 476 MS
EKG Q-T INTERVAL: 496 MS
EKG Q-T INTERVAL: 582 MS
EKG Q-T INTERVAL: 598 MS
EKG QRS DURATION: 82 MS
EKG QRS DURATION: 84 MS
EKG QRS DURATION: 86 MS
EKG QRS DURATION: 90 MS
EKG QTC CALCULATION (BAZETT): 590 MS
EKG QTC CALCULATION (BAZETT): 595 MS
EKG QTC CALCULATION (BAZETT): 719 MS
EKG QTC CALCULATION (BAZETT): 735 MS
EKG R AXIS: 51 DEGREES
EKG R AXIS: 58 DEGREES
EKG R AXIS: 59 DEGREES
EKG R AXIS: 62 DEGREES
EKG T AXIS: 84 DEGREES
EKG T AXIS: 89 DEGREES
EKG T AXIS: 95 DEGREES
EKG T AXIS: 95 DEGREES
EKG VENTRICULAR RATE: 85 BPM
EKG VENTRICULAR RATE: 91 BPM
EKG VENTRICULAR RATE: 92 BPM
EKG VENTRICULAR RATE: 94 BPM

## 2023-04-03 ENCOUNTER — APPOINTMENT (OUTPATIENT)
Age: 68
End: 2023-04-03
Payer: MEDICARE

## 2023-04-03 ENCOUNTER — HOSPITAL ENCOUNTER (EMERGENCY)
Age: 68
Discharge: HOME | End: 2023-04-03
Attending: EMERGENCY MEDICINE
Payer: MEDICARE

## 2023-04-03 VITALS
RESPIRATION RATE: 18 BRPM | WEIGHT: 151 LBS | HEIGHT: 62 IN | OXYGEN SATURATION: 98 % | DIASTOLIC BLOOD PRESSURE: 90 MMHG | SYSTOLIC BLOOD PRESSURE: 151 MMHG | BODY MASS INDEX: 27.79 KG/M2 | TEMPERATURE: 97.4 F | HEART RATE: 89 BPM

## 2023-04-03 DIAGNOSIS — N18.6 END STAGE RENAL DISEASE ON DIALYSIS (HCC): ICD-10-CM

## 2023-04-03 DIAGNOSIS — G89.29 CHRONIC ABDOMINAL PAIN: ICD-10-CM

## 2023-04-03 DIAGNOSIS — Z99.2 END STAGE RENAL DISEASE ON DIALYSIS (HCC): ICD-10-CM

## 2023-04-03 DIAGNOSIS — R10.9 CHRONIC ABDOMINAL PAIN: ICD-10-CM

## 2023-04-03 DIAGNOSIS — R11.0 NAUSEA: Primary | ICD-10-CM

## 2023-04-03 LAB
ALBUMIN SERPL-MCNC: 2.5 G/DL (ref 3.4–5)
ALBUMIN/GLOB SERPL: 0.7 (ref 0.8–1.7)
ALP SERPL-CCNC: 87 U/L (ref 45–117)
ALT SERPL-CCNC: 13 U/L (ref 13–56)
ANION GAP SERPL CALC-SCNC: 14 MMOL/L (ref 3–18)
AST SERPL W P-5'-P-CCNC: 12 U/L (ref 10–38)
BASOPHILS # BLD: 0.1 K/UL (ref 0–0.1)
BASOPHILS NFR BLD: 1 % (ref 0–2)
BILIRUB SERPL-MCNC: 0.7 MG/DL (ref 0.2–1)
BUN SERPL-MCNC: 53 MG/DL (ref 7–18)
BUN/CREAT SERPL: 6 (ref 12–20)
CA-I BLD-MCNC: 6.7 MG/DL (ref 8.5–10.1)
CHLORIDE SERPL-SCNC: 98 MMOL/L (ref 100–111)
CO2 SERPL-SCNC: 24 MMOL/L (ref 21–32)
CREAT SERPL-MCNC: 8.49 MG/DL (ref 0.6–1.3)
DIFFERENTIAL METHOD BLD: ABNORMAL
EOSINOPHIL # BLD: 0.3 K/UL (ref 0–0.4)
EOSINOPHIL NFR BLD: 3 % (ref 0–5)
ERYTHROCYTE [DISTWIDTH] IN BLOOD BY AUTOMATED COUNT: 20.3 % (ref 11.6–14.5)
GLOBULIN SER CALC-MCNC: 3.8 G/DL (ref 2–4)
GLUCOSE SERPL-MCNC: 64 MG/DL (ref 74–99)
HCT VFR BLD AUTO: 26.1 % (ref 35–45)
HGB BLD-MCNC: 8.3 G/DL (ref 12–16)
IMM GRANULOCYTES # BLD AUTO: 0.1 K/UL (ref 0–0.04)
IMM GRANULOCYTES NFR BLD AUTO: 1 % (ref 0–0.5)
LACTATE SERPL-SCNC: 1 MMOL/L (ref 0.4–2)
LIPASE SERPL-CCNC: 213 U/L (ref 73–393)
LYMPHOCYTES # BLD: 1.6 K/UL (ref 0.9–3.6)
LYMPHOCYTES NFR BLD: 17 % (ref 21–52)
MCH RBC QN AUTO: 33.6 PG (ref 24–34)
MCHC RBC AUTO-ENTMCNC: 31.8 G/DL (ref 31–37)
MCV RBC AUTO: 105.7 FL (ref 78–100)
MONOCYTES # BLD: 1 K/UL (ref 0.05–1.2)
MONOCYTES NFR BLD: 10 % (ref 3–10)
NEUTS SEG # BLD: 6.7 K/UL (ref 1.8–8)
NEUTS SEG NFR BLD: 68 % (ref 40–73)
NRBC # BLD: 0 K/UL (ref 0–0.01)
NRBC BLD-RTO: 0 PER 100 WBC
PLATELET # BLD AUTO: 153 K/UL (ref 135–420)
PMV BLD AUTO: 9.4 FL (ref 9.2–11.8)
POTASSIUM SERPL-SCNC: 4.9 MMOL/L (ref 3.5–5.5)
PROT SERPL-MCNC: 6.3 G/DL (ref 6.4–8.2)
RBC # BLD AUTO: 2.47 M/UL (ref 4.2–5.3)
SODIUM SERPL-SCNC: 136 MMOL/L (ref 136–145)
TROPONIN I SERPL HS-MCNC: 153 NG/L (ref 0–54)
WBC # BLD AUTO: 9.8 K/UL (ref 4.6–13.2)

## 2023-04-03 PROCEDURE — 80053 COMPREHEN METABOLIC PANEL: CPT

## 2023-04-03 PROCEDURE — 83690 ASSAY OF LIPASE: CPT

## 2023-04-03 PROCEDURE — 99284 EMERGENCY DEPT VISIT MOD MDM: CPT

## 2023-04-03 PROCEDURE — 71045 X-RAY EXAM CHEST 1 VIEW: CPT

## 2023-04-03 PROCEDURE — 74176 CT ABD & PELVIS W/O CONTRAST: CPT

## 2023-04-03 PROCEDURE — 85025 COMPLETE CBC W/AUTO DIFF WBC: CPT

## 2023-04-03 PROCEDURE — 84484 ASSAY OF TROPONIN QUANT: CPT

## 2023-04-03 PROCEDURE — 83605 ASSAY OF LACTIC ACID: CPT

## 2023-04-03 PROCEDURE — 6370000000 HC RX 637 (ALT 250 FOR IP): Performed by: EMERGENCY MEDICINE

## 2023-04-03 RX ORDER — OXYCODONE HYDROCHLORIDE AND ACETAMINOPHEN 5; 325 MG/1; MG/1
1 TABLET ORAL EVERY 6 HOURS PRN
Qty: 12 TABLET | Refills: 0 | Status: SHIPPED | OUTPATIENT
Start: 2023-04-03 | End: 2023-04-06

## 2023-04-03 RX ORDER — 0.9 % SODIUM CHLORIDE 0.9 %
500 INTRAVENOUS SOLUTION INTRAVENOUS ONCE
Status: DISCONTINUED | OUTPATIENT
Start: 2023-04-03 | End: 2023-04-03

## 2023-04-03 RX ORDER — ONDANSETRON 4 MG/1
4 TABLET, FILM COATED ORAL 3 TIMES DAILY PRN
Qty: 15 TABLET | Refills: 0 | Status: SHIPPED | OUTPATIENT
Start: 2023-04-03

## 2023-04-03 RX ORDER — ONDANSETRON 2 MG/ML
4 INJECTION INTRAMUSCULAR; INTRAVENOUS ONCE
Status: DISCONTINUED | OUTPATIENT
Start: 2023-04-03 | End: 2023-04-03

## 2023-04-03 RX ORDER — ONDANSETRON 4 MG/1
4 TABLET, ORALLY DISINTEGRATING ORAL
Status: COMPLETED | OUTPATIENT
Start: 2023-04-03 | End: 2023-04-03

## 2023-04-03 RX ADMIN — ONDANSETRON 4 MG: 4 TABLET, ORALLY DISINTEGRATING ORAL at 10:42

## 2023-04-03 ASSESSMENT — LIFESTYLE VARIABLES
HOW OFTEN DO YOU HAVE A DRINK CONTAINING ALCOHOL: NEVER
HOW MANY STANDARD DRINKS CONTAINING ALCOHOL DO YOU HAVE ON A TYPICAL DAY: PATIENT DOES NOT DRINK

## 2023-04-03 NOTE — ED NOTES
Unable to obtain 2nd troponin and lactic acid. Nursing as well as lab staff has tried multiple times with no success.       Edson Delacruz RN  04/03/23 [id number]

## 2023-04-03 NOTE — ED PROVIDER NOTES
mildly to moderately dehydrated. She will be moderately resuscitated, screened with an EKG and paired troponin, CT scan without IV contrast with. Lactate 4 inflammatory cause. Her past medical history contributes to today's visit. I have interpreted her vital signs, EKG, chest x-ray and CT scan. Amount and/or Complexity of Data Reviewed  Independent Historian: EMS  External Data Reviewed: labs, radiology and ECG. Labs: ordered. Radiology: ordered and independent interpretation performed. ECG/medicine tests: ordered and independent interpretation performed. Risk  Prescription drug management. REASSESSMENT          CRITICAL CARE TIME   Total Critical Care time was  minutes, excluding separately reportable procedures. There was a high probability of clinically significant/life threatening deterioration in the patient's condition which required my urgent intervention. CONSULTS:  None    PROCEDURES:  Unless otherwise noted below, none     Procedures        FINAL IMPRESSION    No diagnosis found. DISPOSITION/PLAN   DISPOSITION        PATIENT REFERRED TO:  No follow-up provider specified. DISCHARGE MEDICATIONS:  New Prescriptions    No medications on file     Controlled Substances Monitoring:     No flowsheet data found.     (Please note that portions of this note were completed with a voice recognition program.  Efforts were made to edit the dictations but occasionally words are mis-transcribed.)    Asael Fuchs MD (electronically signed)  Attending Emergency Physician           Rubin Cantor MD  04/03/23 2660

## 2023-04-03 NOTE — DISCHARGE INSTRUCTIONS
Follow-up with the cardiologist about the fluid around your heart. This is not causing chest pain today. This is most likely related to your kidney failure and is treated with dialysis. Follow-up with your primary care provider about the fluid around your lungs. There are no signs or symptoms of pneumonia or infection. You do not have an elevated white blood cell count or a fever. Instead this is related to your kidney failure and is treated with dialysis.

## 2023-04-03 NOTE — ED NOTES
Patient declines to have venipuncture attempted by nursing staff. She states \" the doctor always has to put the IV in my neck\".       Matt David RN  04/03/23 3548

## 2023-04-05 ENCOUNTER — OFFICE VISIT (OUTPATIENT)
Facility: CLINIC | Age: 68
End: 2023-04-05
Payer: MEDICARE

## 2023-04-05 VITALS
DIASTOLIC BLOOD PRESSURE: 74 MMHG | OXYGEN SATURATION: 97 % | HEIGHT: 62 IN | TEMPERATURE: 98.7 F | SYSTOLIC BLOOD PRESSURE: 160 MMHG | HEART RATE: 77 BPM | BODY MASS INDEX: 27.62 KG/M2

## 2023-04-05 DIAGNOSIS — R77.8 ELEVATED TROPONIN: ICD-10-CM

## 2023-04-05 DIAGNOSIS — F33.1 MODERATE EPISODE OF RECURRENT MAJOR DEPRESSIVE DISORDER (HCC): ICD-10-CM

## 2023-04-05 DIAGNOSIS — I10 UNCONTROLLED HYPERTENSION: ICD-10-CM

## 2023-04-05 DIAGNOSIS — R19.7 DIARRHEA OF PRESUMED INFECTIOUS ORIGIN: Primary | ICD-10-CM

## 2023-04-05 PROCEDURE — 3078F DIAST BP <80 MM HG: CPT | Performed by: STUDENT IN AN ORGANIZED HEALTH CARE EDUCATION/TRAINING PROGRAM

## 2023-04-05 PROCEDURE — 99214 OFFICE O/P EST MOD 30 MIN: CPT | Performed by: STUDENT IN AN ORGANIZED HEALTH CARE EDUCATION/TRAINING PROGRAM

## 2023-04-05 PROCEDURE — 3074F SYST BP LT 130 MM HG: CPT | Performed by: STUDENT IN AN ORGANIZED HEALTH CARE EDUCATION/TRAINING PROGRAM

## 2023-04-05 PROCEDURE — 1123F ACP DISCUSS/DSCN MKR DOCD: CPT | Performed by: STUDENT IN AN ORGANIZED HEALTH CARE EDUCATION/TRAINING PROGRAM

## 2023-04-05 PROCEDURE — G8428 CUR MEDS NOT DOCUMENT: HCPCS | Performed by: STUDENT IN AN ORGANIZED HEALTH CARE EDUCATION/TRAINING PROGRAM

## 2023-04-05 PROCEDURE — 1090F PRES/ABSN URINE INCON ASSESS: CPT | Performed by: STUDENT IN AN ORGANIZED HEALTH CARE EDUCATION/TRAINING PROGRAM

## 2023-04-05 PROCEDURE — 1111F DSCHRG MED/CURRENT MED MERGE: CPT | Performed by: STUDENT IN AN ORGANIZED HEALTH CARE EDUCATION/TRAINING PROGRAM

## 2023-04-05 PROCEDURE — 3017F COLORECTAL CA SCREEN DOC REV: CPT | Performed by: STUDENT IN AN ORGANIZED HEALTH CARE EDUCATION/TRAINING PROGRAM

## 2023-04-05 PROCEDURE — G8417 CALC BMI ABV UP PARAM F/U: HCPCS | Performed by: STUDENT IN AN ORGANIZED HEALTH CARE EDUCATION/TRAINING PROGRAM

## 2023-04-05 PROCEDURE — G8399 PT W/DXA RESULTS DOCUMENT: HCPCS | Performed by: STUDENT IN AN ORGANIZED HEALTH CARE EDUCATION/TRAINING PROGRAM

## 2023-04-05 PROCEDURE — 1036F TOBACCO NON-USER: CPT | Performed by: STUDENT IN AN ORGANIZED HEALTH CARE EDUCATION/TRAINING PROGRAM

## 2023-04-05 RX ORDER — AMOXICILLIN AND CLAVULANATE POTASSIUM 875; 125 MG/1; MG/1
1 TABLET, FILM COATED ORAL DAILY
COMMUNITY
Start: 2023-03-15 | End: 2023-04-05

## 2023-04-05 ASSESSMENT — PATIENT HEALTH QUESTIONNAIRE - PHQ9
5. POOR APPETITE OR OVEREATING: 2
SUM OF ALL RESPONSES TO PHQ QUESTIONS 1-9: 12
6. FEELING BAD ABOUT YOURSELF - OR THAT YOU ARE A FAILURE OR HAVE LET YOURSELF OR YOUR FAMILY DOWN: 1
4. FEELING TIRED OR HAVING LITTLE ENERGY: 3
SUM OF ALL RESPONSES TO PHQ QUESTIONS 1-9: 12
9. THOUGHTS THAT YOU WOULD BE BETTER OFF DEAD, OR OF HURTING YOURSELF: 0
SUM OF ALL RESPONSES TO PHQ QUESTIONS 1-9: 12
2. FEELING DOWN, DEPRESSED OR HOPELESS: 3
7. TROUBLE CONCENTRATING ON THINGS, SUCH AS READING THE NEWSPAPER OR WATCHING TELEVISION: 0
10. IF YOU CHECKED OFF ANY PROBLEMS, HOW DIFFICULT HAVE THESE PROBLEMS MADE IT FOR YOU TO DO YOUR WORK, TAKE CARE OF THINGS AT HOME, OR GET ALONG WITH OTHER PEOPLE: 2
8. MOVING OR SPEAKING SO SLOWLY THAT OTHER PEOPLE COULD HAVE NOTICED. OR THE OPPOSITE, BEING SO FIGETY OR RESTLESS THAT YOU HAVE BEEN MOVING AROUND A LOT MORE THAN USUAL: 0
3. TROUBLE FALLING OR STAYING ASLEEP: 3
SUM OF ALL RESPONSES TO PHQ QUESTIONS 1-9: 12

## 2023-04-05 NOTE — PROGRESS NOTES
Wagner Coyle presents today for   Chief Complaint   Patient presents with    ED Follow-up       Is someone accompanying this pt? Yes her daughter Evette Smith     Is the patient using any DME equipment during [address]? wheelchair    Health Maintenance reviewed and discussed and ordered per Provider. Health Maintenance Due   Topic Date Due    HIV screen  Never done    Hepatitis C screen  Never done    DTaP/Tdap/Td vaccine (1 - Tdap) Never done    Shingles vaccine (1 of 2) Never done    COVID-19 Vaccine (4 - Booster for Pfizer series) 05/18/2022    Lipids  01/20/2023    Annual Wellness Visit (AWV)  01/21/2023   . Coordination of Care:  1. \"Have you been to the ER, urgent care clinic since your last visit? Hospitalized since your last visit? \" Yes 4/3/23 [facility] ER     2. \"Have you seen or consulted any other health care providers outside of the [address] since your last visit? \" Yes [facility] Cardiology     3. For patients aged 39-70: Has the patient had a colonoscopy? Yes- No care gap present    If the patient is female:    4. For patients aged 41-77: Has the patient had a mammogram within the past 2 years? Yes- No care gap present    5. For patients aged 21-65: Has the patient had a pap smear?  Yes- No care gap present

## 2023-04-05 NOTE — PROGRESS NOTES
ED follow up note    SUBJECTIVE    Patient presents for ED follow up. Patient was seen at [facility] ED on 4/30/2023. ED discharge diagnoses: Unknown. ED note was reviewed. ED course and discharge recommendations:  Patient presented to the ED on 4/3/2023 for abdominal pain, nausea, and diarrhea. She was having loose stools for the last 2 days. She reports they are watery. Has been progressively getting worse. Reports that she has had 6 watery stools this morning. Feels like she is dehydrated. She did have dialysis and reports they stopped within 10 minutes because she was having chest pain. Reports they still britney 2 L off. Reports she is having increased shortness of breath now. Reports bilateral hand swelling. Reports she is planning on getting a stress test tomorrow which was previously canceled because they were unable to get an IV placement for her at the last visit. Reports that she did see the ED on 4/30/2023 and was discharged early because they had no one that could place an IV. We reviewed the recent ED visit in detail. The patients past medical history, allergies, medication list, social history, and family medical history were documented, updated, and reviewed in the chart. OBJECTIVE    Blood pressure (!) 160/74, pulse 77, temperature 98.7 °F (37.1 °C), temperature source Temporal, height 5' 2\" (1.575 m), SpO2 97 %. General:  Alert, cooperative, ill-appearing in no apparent distress. Head:  Head is symmetric, normocephalic without evidence of trauma or deformity. Eyes:  The conjunctiva are clear and noninjected. CV:  The heart sounds are regular in rate and rhythm. There is a normal S1 and S2. There or no murmurs, rubs, or gallops. Lungs: Inspiratory and expiratory efforts are full and unlabored. Crackles bilaterally to lower lung fields  Skin:  No rashes, no jaundice, cap refill <2 sec.     ASSESSMENT / PLAN      Diarrhea of presumed infectious origin  Elevated

## 2023-04-18 ENCOUNTER — CARE COORDINATION (OUTPATIENT)
Facility: CLINIC | Age: 68
End: 2023-04-18

## 2023-04-18 NOTE — CARE COORDINATION
maintain home (clean home, laundry): Needs Assistance  Ability to drive and/or has transportation: Needs Assistance  Ability to do shopping: Needs Assistance  Ability to manage finances: Needs Assistance  Is patient able to live independently?: No     Current Housing: Private Residence                 Thinking about your patient's physical health needs, are there any symptoms or problems (risk indicators) you are unsure about that require further investigation?: No identified areas of uncertainly or problems already being investigated   Are the patients physical health problems impacting on their mental well-being?: No identified areas of concern   Are there any problems with your patients lifestyle behaviors (alcohol, drugs, diet, exercise) that are impacting on physical or mental well-being?: No identified areas of concern   Suggested Interventions and Community Resources                  Future Appointments   Date Time Provider Candace Velez   4/19/2023 11:00 AM Angela Serrano MD [facility] BS AMB   4/19/2023  1:00 PM Sheila Zimmerman MD [facility] BS AMB

## 2023-04-19 ENCOUNTER — OFFICE VISIT (OUTPATIENT)
Facility: CLINIC | Age: 68
End: 2023-04-19

## 2023-04-19 ENCOUNTER — OFFICE VISIT (OUTPATIENT)
Age: 68
End: 2023-04-19

## 2023-04-19 ENCOUNTER — PREP FOR PROCEDURE (OUTPATIENT)
Age: 68
End: 2023-04-19

## 2023-04-19 VITALS
HEIGHT: 62 IN | DIASTOLIC BLOOD PRESSURE: 74 MMHG | TEMPERATURE: 98.3 F | OXYGEN SATURATION: 100 % | SYSTOLIC BLOOD PRESSURE: 131 MMHG | BODY MASS INDEX: 24.84 KG/M2 | HEART RATE: 71 BPM | WEIGHT: 135 LBS

## 2023-04-19 VITALS
SYSTOLIC BLOOD PRESSURE: 141 MMHG | OXYGEN SATURATION: 99 % | HEART RATE: 73 BPM | DIASTOLIC BLOOD PRESSURE: 65 MMHG | BODY MASS INDEX: 24.84 KG/M2 | HEIGHT: 62 IN | WEIGHT: 135 LBS

## 2023-04-19 DIAGNOSIS — R63.0 APPETITE LOSS: ICD-10-CM

## 2023-04-19 DIAGNOSIS — I25.119 CORONARY ARTERY DISEASE INVOLVING NATIVE CORONARY ARTERY OF NATIVE HEART WITH ANGINA PECTORIS (HCC): ICD-10-CM

## 2023-04-19 DIAGNOSIS — R11.2 NAUSEA AND VOMITING, UNSPECIFIED VOMITING TYPE: Primary | ICD-10-CM

## 2023-04-19 DIAGNOSIS — Z79.01 CURRENT USE OF LONG TERM ANTICOAGULATION: ICD-10-CM

## 2023-04-19 DIAGNOSIS — R11.0 CHRONIC NAUSEA: ICD-10-CM

## 2023-04-19 DIAGNOSIS — N18.6 END STAGE RENAL DISEASE (HCC): ICD-10-CM

## 2023-04-19 DIAGNOSIS — Z99.2 HEMODIALYSIS ACCESS SITE WITH ARTERIOVENOUS GRAFT (HCC): ICD-10-CM

## 2023-04-19 DIAGNOSIS — Z09 HOSPITAL DISCHARGE FOLLOW-UP: Primary | ICD-10-CM

## 2023-04-19 DIAGNOSIS — R10.84 GENERALIZED ABDOMINAL PAIN: ICD-10-CM

## 2023-04-19 DIAGNOSIS — K57.30 DIVERTICULOSIS LARGE INTESTINE W/O PERFORATION OR ABSCESS W/O BLEEDING: ICD-10-CM

## 2023-04-19 DIAGNOSIS — I48.91 ATRIAL FIBRILLATION, UNSPECIFIED TYPE (HCC): ICD-10-CM

## 2023-04-19 DIAGNOSIS — Z95.0 PACEMAKER: ICD-10-CM

## 2023-04-19 RX ORDER — PROMETHAZINE HYDROCHLORIDE 12.5 MG/1
TABLET ORAL
COMMUNITY
Start: 2023-04-12

## 2023-04-19 RX ORDER — SODIUM CHLORIDE, SODIUM LACTATE, POTASSIUM CHLORIDE, CALCIUM CHLORIDE 600; 310; 30; 20 MG/100ML; MG/100ML; MG/100ML; MG/100ML
INJECTION, SOLUTION INTRAVENOUS CONTINUOUS
OUTPATIENT
Start: 2023-04-19

## 2023-04-19 RX ORDER — DICYCLOMINE HYDROCHLORIDE 10 MG/1
CAPSULE ORAL
COMMUNITY
Start: 2023-04-05

## 2023-04-19 RX ORDER — MIRTAZAPINE 7.5 MG/1
7.5 TABLET, FILM COATED ORAL NIGHTLY
Qty: 90 TABLET | Refills: 1 | Status: SHIPPED | OUTPATIENT
Start: 2023-04-19

## 2023-04-19 ASSESSMENT — PATIENT HEALTH QUESTIONNAIRE - PHQ9
3. TROUBLE FALLING OR STAYING ASLEEP: 3
SUM OF ALL RESPONSES TO PHQ QUESTIONS 1-9: 14
SUM OF ALL RESPONSES TO PHQ QUESTIONS 1-9: 14
10. IF YOU CHECKED OFF ANY PROBLEMS, HOW DIFFICULT HAVE THESE PROBLEMS MADE IT FOR YOU TO DO YOUR WORK, TAKE CARE OF THINGS AT HOME, OR GET ALONG WITH OTHER PEOPLE: 0
6. FEELING BAD ABOUT YOURSELF - OR THAT YOU ARE A FAILURE OR HAVE LET YOURSELF OR YOUR FAMILY DOWN: 1
5. POOR APPETITE OR OVEREATING: 3
1. LITTLE INTEREST OR PLEASURE IN DOING THINGS: 0
4. FEELING TIRED OR HAVING LITTLE ENERGY: 3
SUM OF ALL RESPONSES TO PHQ QUESTIONS 1-9: 14
SUM OF ALL RESPONSES TO PHQ QUESTIONS 1-9: 14
7. TROUBLE CONCENTRATING ON THINGS, SUCH AS READING THE NEWSPAPER OR WATCHING TELEVISION: 0
2. FEELING DOWN, DEPRESSED OR HOPELESS: 1
9. THOUGHTS THAT YOU WOULD BE BETTER OFF DEAD, OR OF HURTING YOURSELF: 0
8. MOVING OR SPEAKING SO SLOWLY THAT OTHER PEOPLE COULD HAVE NOTICED. OR THE OPPOSITE, BEING SO FIGETY OR RESTLESS THAT YOU HAVE BEEN MOVING AROUND A LOT MORE THAN USUAL: 3
SUM OF ALL RESPONSES TO PHQ9 QUESTIONS 1 & 2: 1

## 2023-04-19 NOTE — PROGRESS NOTES
Citlaly Jimenez presents today for stomach pains when she eats. Patient states that this has been going on for a long time and patient states that this is getting worse. Patient states that she noticed this getting worse after she had her gallbladder removed. Patient states that she doesn't have regular bowel movements and when she does it is exteremly painful, she starts to sweat and patient states it can put her on her knees like she is having a baby. Is someone accompanying this pt? Yes, Daughter    Is the patient using any DME equipment during [address]? NO    Depression Screening:  No flowsheet data found. Learning Assessment:  No flowsheet data found. Abuse Screening:  No flowsheet data found. Fall Risk  No flowsheet data found. ADL  No flowsheet data found. Health Maintenance reviewed and discussed and ordered per Provider. Health Maintenance Due   Topic Date Due    HIV screen  Never done    Hepatitis C screen  Never done    DTaP/Tdap/Td vaccine (1 - Tdap) Never done    Shingles vaccine (1 of 2) Never done    COVID-19 Vaccine (4 - Booster for Pfizer series) 05/18/2022    Lipids  01/20/2023    Annual Wellness Visit (AWV)  01/21/2023   . Coordination of Care:  1. Have you been to the ER, urgent care clinic since your last visit? Hospitalized since your last visit? Was seen at [facility] last week for stomach pain and Hospital visit every month since February. 2. Have you seen or consulted any other health care providers outside of the [address] [place] [address] since your last visit? Include any pap smears or colon screening.  NO
RDW   Date Value Ref Range Status   04/03/2023 20.3 (H) 11.6 - 14.5 % Final     Platelets   Date Value Ref Range Status   04/03/2023 153 135 - 420 K/uL Final           Lab Results   Component Value Date    ALT 13 04/03/2023    AST 12 04/03/2023    ALKPHOS 87 04/03/2023    BILITOT 0.7 04/03/2023       Lab Results   Component Value Date    LIPASE 213 04/03/2023           CAT Scan Result (most recent):  CT ABDOMEN PELVIS WO IV CONTRAST 04/03/2023    Narrative  EXAM: CT ABDOMEN PELVIS WO CONTRAST    INDICATION: abdominal pain    COMPARISON: CT of the abdomen/pelvis February 26, 2023    IV CONTRAST: None. ORAL CONTRAST: None    TECHNIQUE:  Thin axial images were obtained through the abdomen and pelvis. Coronal and  sagittal reformats were generated. CT dose reduction was achieved through use of  a standardized protocol tailored for this examination and automatic exposure  control for dose modulation. The absence of intravenous contrast material reduces the sensitivity for  evaluation of the vasculature and solid organs. FINDINGS:  LOWER THORAX: There are small bilateral pleural effusions with associated  atelectasis that are new compared to the previous CT. There are mild bibasilar  groundglass infiltrates which may represent atelectasis, edema, or infection. There is a small pericardial effusion. LIVER: No mass. BILIARY TREE: Status post cholecystectomy. CBD is not dilated. SPLEEN: within normal limits. PANCREAS: No focal abnormality. ADRENALS: Unremarkable. KIDNEYS/URETERS: There is no hydronephrosis. The native kidneys are atrophic. Stable simple cyst is seen at the upper pole of the LEFT kidney. No calcified  stone is seen. There are bilateral renal vascular calcifications. There is a  RIGHT pelvic transplant kidney which demonstrates no hydronephrosis. There is  stable, mild perinephric stranding as well as a mid pole simple cyst with  peripheral calcification. STOMACH: Unremarkable.   SMALL

## 2023-04-19 NOTE — PROGRESS NOTES
Zita Dumont presents today for   Chief Complaint   Patient presents with    Follow-Up from Hospital       Is someone accompanying this pt? Yes her daughter Myron Cintron     Is the patient using any DME equipment during [address]? Wheelchair     Health Maintenance reviewed and discussed and ordered per Provider. Health Maintenance Due   Topic Date Due    HIV screen  Never done    Hepatitis C screen  Never done    DTaP/Tdap/Td vaccine (1 - Tdap) Never done    Shingles vaccine (1 of 2) Never done    COVID-19 Vaccine (4 - Booster for Pfizer series) 05/18/2022    Lipids  01/20/2023    Annual Wellness Visit (AWV)  01/21/2023   . Coordination of Care:  1. \"Have you been to the ER, urgent care clinic since your last visit? Hospitalized since your last visit? \" Yes Vicki Qualia     2. \"Have you seen or consulted any other health care providers outside of the [address] [place] [address] since your last visit? \" No    3. For patients aged 39-70: Has the patient had a colonoscopy? Yes- No care gap present    If the patient is female:    4. For patients aged 41-77: Has the patient had a mammogram within the past 2 years? Yes- No care gap present    5. For patients aged 21-65: Has the patient had a pap smear?  Yes- No care gap present
[facility] discharge follow-up  Comments:  Patient already has plans to see nephrology on Friday for another blood transfusion, saw GI this morning, and has an appointment to see cardiology next week. Chronic nausea  Comments:  Suspect cardiac etiology versus uremia given ESRD status. We will continue to monitor  Appetite loss  Comments:  Patient has persistent appetite loss and low mood. We will start mirtazapine. Stop Lexapro  Orders:  -     mirtazapine (REMERON) 7.5 MG tablet; Take 1 tablet by mouth nightly, Disp-90 tablet, R-1Normal     I have reviewed the hospital discharge summary, lab work to include CBC, BMP, hemoglobin A1c, CMP, thyroid      Recommended follow up appointments:  - has followed up with GI today. Has a follow up appt 5/24/23  - Has an appt to see cardiology 4/27/23. Plan for stress test.    DME: none    Home health: none    All medications were reconciled from hospital discharge.

## 2023-04-25 NOTE — Clinical Note
Patient Class[de-identified] OBSERVATION [104]   Type of Bed: Telemetry [19]   Cardiac Monitoring Required?: Yes   Reason for Observation: colitis, N/v, weakness,   Admitting Diagnosis: Colitis [573123]   Admitting Diagnosis: Intractable nausea and vomiting [4434032]   Admitting Diagnosis: Weakness [390505]   Admitting Diagnosis: ESRD (end stage renal disease) on dialysis [facility]) [[id number]]   Admitting Physician: Joshua Miller   Attending Physician: Ruslan Morales [[id number]]
Admission

## 2023-04-27 ENCOUNTER — CARE COORDINATION (OUTPATIENT)
Facility: CLINIC | Age: 68
End: 2023-04-27

## 2023-04-27 ASSESSMENT — PATIENT HEALTH QUESTIONNAIRE - PHQ9
SUM OF ALL RESPONSES TO PHQ QUESTIONS 1-9: 1

## 2023-04-27 NOTE — CARE COORDINATION
Ambulatory Care Coordination Note  2023    Patient Current Location:  [place]    ACM contacted the patient and family by telephone. Verified name and  with family as identifiers. Provided introduction to self, and explanation of the ACM role. ACM: John Delgadillo RN    Challenges to be reviewed by the provider   Additional needs identified to be addressed with provider: No  Patient is treating her diarrhea by not eating - concern that she will get dehydrated. PCP appointment  schedule for                Method of communication with provider: phone. Spoke with patient's daughter. Stated Patient is doing much better    Continues to wear Life Vest - schedule for AICD on Wednesday  [facility]-3-23 in [facility]     Any changes in med therapy noted in med list - no complications or side effects  . Daughter understands medication therapy and adjustments needed prior to AICD implant  Further / follow up appointments listed below - reviewed upcoming appointments  Further questions answered as needed and patient has ACM contact information  Depression screen done - PHQ2 score = 0  Respiratory and cardiac status shows no issues at present    Offered patient enrollment in the Remote Patient Monitoring (RPM) program for in-home monitoring: NA. Ambulatory Care Coordination Assessment    Care Coordination Protocol  Referral from Primary Care Provider: No  Week 1 - Initial Assessment     Do you have all of your prescriptions and are they filled?: Yes  Barriers to medication adherence: None  Are you able to afford your medications?: Yes  How often do you have trouble taking your medications the way you have been told to take them?: I always take them as prescribed.         Ability to seek help/take action for Emergent Urgent situations i.e. fire, crime, inclement weather or health crisis.: Needs Assistance  Ability to ambulate to restroom: Needs Assistance  Ability handle personal hygeine needs

## 2023-05-01 NOTE — PERIOP NOTES
During PA patient states she has not taken Eliquis yet today. Patient instructed to hold Eliquis until device insertion per Dr. Tin Yung order. Pt verbalized understanding.

## 2023-05-03 ENCOUNTER — HOSPITAL ENCOUNTER (OUTPATIENT)
Age: 68
Setting detail: OBSERVATION
Discharge: HOME | End: 2023-05-05
Attending: INTERNAL MEDICINE | Admitting: INTERNAL MEDICINE
Payer: MEDICARE

## 2023-05-03 ENCOUNTER — ANESTHESIA EVENT (OUTPATIENT)
Dept: NON INVASIVE DIAGNOSTICS | Age: 68
End: 2023-05-03
Payer: MEDICARE

## 2023-05-03 ENCOUNTER — ANESTHESIA (OUTPATIENT)
Dept: NON INVASIVE DIAGNOSTICS | Age: 68
End: 2023-05-03
Payer: MEDICARE

## 2023-05-03 DIAGNOSIS — N18.6 ESRD NEEDING DIALYSIS (HCC): Primary | ICD-10-CM

## 2023-05-03 DIAGNOSIS — I46.9 CARDIAC ARREST (HCC): ICD-10-CM

## 2023-05-03 DIAGNOSIS — Z99.2 ESRD NEEDING DIALYSIS (HCC): Primary | ICD-10-CM

## 2023-05-03 LAB
ALBUMIN SERPL-MCNC: 3 G/DL (ref 3.5–5)
ALBUMIN/GLOB SERPL: 0.9 (ref 1.1–2.2)
ALP SERPL-CCNC: 81 U/L (ref 45–117)
ALT SERPL-CCNC: 12 U/L (ref 12–78)
ANION GAP SERPL CALC-SCNC: 4 MMOL/L (ref 5–15)
APTT PPP: 34.9 SEC (ref 21.2–34.1)
AST SERPL W P-5'-P-CCNC: 15 U/L (ref 15–37)
ATRIAL RATE: 73 BPM
BILIRUB SERPL-MCNC: 0.4 MG/DL (ref 0.2–1)
BUN SERPL-MCNC: 26 MG/DL (ref 6–20)
BUN/CREAT SERPL: 4 (ref 12–20)
CA-I BLD-MCNC: 7.4 MG/DL (ref 8.5–10.1)
CALCULATED P AXIS, ECG09: 31 DEGREES
CALCULATED R AXIS, ECG10: 51 DEGREES
CALCULATED T AXIS, ECG11: 67 DEGREES
CHLORIDE SERPL-SCNC: 103 MMOL/L (ref 97–108)
CO2 SERPL-SCNC: 32 MMOL/L (ref 21–32)
CREAT SERPL-MCNC: 6.62 MG/DL (ref 0.55–1.02)
DIAGNOSIS, 93000: NORMAL
ERYTHROCYTE [DISTWIDTH] IN BLOOD BY AUTOMATED COUNT: 16.5 % (ref 11.5–14.5)
GLOBULIN SER CALC-MCNC: 3.2 G/DL (ref 2–4)
GLUCOSE SERPL-MCNC: 83 MG/DL (ref 65–100)
HCT VFR BLD AUTO: 21.4 % (ref 35–47)
HGB BLD-MCNC: 6.8 G/DL (ref 11.5–16)
INR PPP: 1.3 (ref 0.9–1.1)
MCH RBC QN AUTO: 33.3 PG (ref 26–34)
MCHC RBC AUTO-ENTMCNC: 31.8 G/DL (ref 30–36.5)
MCV RBC AUTO: 104.9 FL (ref 80–99)
NRBC # BLD: 0 K/UL (ref 0–0.01)
NRBC BLD-RTO: 0 PER 100 WBC
P-R INTERVAL, ECG05: 182 MS
PLATELET # BLD AUTO: 159 K/UL (ref 150–400)
PMV BLD AUTO: 10 FL (ref 8.9–12.9)
POTASSIUM SERPL-SCNC: 4 MMOL/L (ref 3.5–5.1)
PROT SERPL-MCNC: 6.2 G/DL (ref 6.4–8.2)
PROTHROMBIN TIME: 16.1 SEC (ref 11.9–14.6)
Q-T INTERVAL, ECG07: 480 MS
QRS DURATION, ECG06: 80 MS
QTC CALCULATION (BEZET), ECG08: 528 MS
RBC # BLD AUTO: 2.04 M/UL (ref 3.8–5.2)
SODIUM SERPL-SCNC: 139 MMOL/L (ref 136–145)
THERAPEUTIC RANGE,PTTT: ABNORMAL SEC (ref 82–109)
VENTRICULAR RATE, ECG03: 73 BPM
WBC # BLD AUTO: 6.3 K/UL (ref 3.6–11)

## 2023-05-03 PROCEDURE — 74011250637 HC RX REV CODE- 250/637: Performed by: INTERNAL MEDICINE

## 2023-05-03 PROCEDURE — 85610 PROTHROMBIN TIME: CPT

## 2023-05-03 PROCEDURE — 93005 ELECTROCARDIOGRAM TRACING: CPT

## 2023-05-03 PROCEDURE — 86923 COMPATIBILITY TEST ELECTRIC: CPT

## 2023-05-03 PROCEDURE — 80053 COMPREHEN METABOLIC PANEL: CPT

## 2023-05-03 PROCEDURE — 86900 BLOOD TYPING SEROLOGIC ABO: CPT

## 2023-05-03 PROCEDURE — P9016 RBC LEUKOCYTES REDUCED: HCPCS

## 2023-05-03 PROCEDURE — 74011000250 HC RX REV CODE- 250

## 2023-05-03 PROCEDURE — G0378 HOSPITAL OBSERVATION PER HR: HCPCS

## 2023-05-03 PROCEDURE — 36430 TRANSFUSION BLD/BLD COMPNT: CPT

## 2023-05-03 PROCEDURE — 74011250636 HC RX REV CODE- 250/636: Performed by: INTERNAL MEDICINE

## 2023-05-03 PROCEDURE — 36415 COLL VENOUS BLD VENIPUNCTURE: CPT

## 2023-05-03 PROCEDURE — 85730 THROMBOPLASTIN TIME PARTIAL: CPT

## 2023-05-03 PROCEDURE — 94640 AIRWAY INHALATION TREATMENT: CPT

## 2023-05-03 PROCEDURE — 74011000250 HC RX REV CODE- 250: Performed by: INTERNAL MEDICINE

## 2023-05-03 PROCEDURE — 85027 COMPLETE CBC AUTOMATED: CPT

## 2023-05-03 RX ORDER — DIPHENHYDRAMINE HCL 25 MG
25 CAPSULE ORAL ONCE
Status: COMPLETED | OUTPATIENT
Start: 2023-05-03 | End: 2023-05-03

## 2023-05-03 RX ORDER — SODIUM CHLORIDE 0.9 % (FLUSH) 0.9 %
5-40 SYRINGE (ML) INJECTION EVERY 8 HOURS
Status: DISCONTINUED | OUTPATIENT
Start: 2023-05-03 | End: 2023-05-05 | Stop reason: SDUPTHER

## 2023-05-03 RX ORDER — MONTELUKAST SODIUM 10 MG/1
10 TABLET ORAL DAILY
Status: DISCONTINUED | OUTPATIENT
Start: 2023-05-04 | End: 2023-05-05 | Stop reason: HOSPADM

## 2023-05-03 RX ORDER — BUDESONIDE AND FORMOTEROL FUMARATE DIHYDRATE 160; 4.5 UG/1; UG/1
1 AEROSOL RESPIRATORY (INHALATION)
Status: DISCONTINUED | OUTPATIENT
Start: 2023-05-03 | End: 2023-05-03

## 2023-05-03 RX ORDER — ARFORMOTEROL TARTRATE 15 UG/2ML
15 SOLUTION RESPIRATORY (INHALATION)
Status: DISCONTINUED | OUTPATIENT
Start: 2023-05-03 | End: 2023-05-03

## 2023-05-03 RX ORDER — SODIUM CHLORIDE 9 MG/ML
250 INJECTION, SOLUTION INTRAVENOUS AS NEEDED
Status: DISCONTINUED | OUTPATIENT
Start: 2023-05-03 | End: 2023-05-05 | Stop reason: SDUPTHER

## 2023-05-03 RX ORDER — PANTOPRAZOLE SODIUM 40 MG/1
40 TABLET, DELAYED RELEASE ORAL
Status: DISCONTINUED | OUTPATIENT
Start: 2023-05-04 | End: 2023-05-05 | Stop reason: HOSPADM

## 2023-05-03 RX ORDER — ACETAMINOPHEN 650 MG/1
650 SUPPOSITORY RECTAL
Status: DISCONTINUED | OUTPATIENT
Start: 2023-05-03 | End: 2023-05-05 | Stop reason: HOSPADM

## 2023-05-03 RX ORDER — LOSARTAN POTASSIUM 50 MG/1
50 TABLET ORAL DAILY
Status: DISCONTINUED | OUTPATIENT
Start: 2023-05-04 | End: 2023-05-05 | Stop reason: HOSPADM

## 2023-05-03 RX ORDER — BUDESONIDE 0.5 MG/2ML
500 INHALANT ORAL
Status: DISCONTINUED | OUTPATIENT
Start: 2023-05-03 | End: 2023-05-03

## 2023-05-03 RX ORDER — CINACALCET 30 MG/1
30 TABLET, FILM COATED ORAL DAILY
Status: DISCONTINUED | OUTPATIENT
Start: 2023-05-04 | End: 2023-05-05 | Stop reason: HOSPADM

## 2023-05-03 RX ORDER — MAGNESIUM SULFATE 1 G/100ML
1 INJECTION INTRAVENOUS ONCE
Status: COMPLETED | OUTPATIENT
Start: 2023-05-03 | End: 2023-05-03

## 2023-05-03 RX ORDER — IPRATROPIUM BROMIDE AND ALBUTEROL SULFATE 2.5; .5 MG/3ML; MG/3ML
SOLUTION RESPIRATORY (INHALATION)
Status: COMPLETED
Start: 2023-05-03 | End: 2023-05-03

## 2023-05-03 RX ORDER — SEVELAMER CARBONATE 800 MG/1
800 TABLET, FILM COATED ORAL 3 TIMES DAILY
Status: DISCONTINUED | OUTPATIENT
Start: 2023-05-03 | End: 2023-05-05 | Stop reason: HOSPADM

## 2023-05-03 RX ORDER — MAG HYDROX/ALUMINUM HYD/SIMETH 200-200-20
10 SUSPENSION, ORAL (FINAL DOSE FORM) ORAL
Status: DISCONTINUED | OUTPATIENT
Start: 2023-05-03 | End: 2023-05-05 | Stop reason: HOSPADM

## 2023-05-03 RX ORDER — HEPARIN SODIUM 5000 [USP'U]/ML
5000 INJECTION, SOLUTION INTRAVENOUS; SUBCUTANEOUS EVERY 8 HOURS
Status: DISCONTINUED | OUTPATIENT
Start: 2023-05-03 | End: 2023-05-05 | Stop reason: HOSPADM

## 2023-05-03 RX ORDER — SODIUM CHLORIDE 9 MG/ML
250 INJECTION, SOLUTION INTRAVENOUS AS NEEDED
Status: DISCONTINUED | OUTPATIENT
Start: 2023-05-03 | End: 2023-05-05 | Stop reason: HOSPADM

## 2023-05-03 RX ORDER — SODIUM CHLORIDE 9 MG/ML
50 INJECTION, SOLUTION INTRAVENOUS CONTINUOUS
Status: DISCONTINUED | OUTPATIENT
Start: 2023-05-03 | End: 2023-05-03

## 2023-05-03 RX ORDER — CALCITRIOL 0.25 UG/1
0.5 CAPSULE ORAL SEE ADMIN INSTRUCTIONS
Status: DISCONTINUED | OUTPATIENT
Start: 2023-05-03 | End: 2023-05-05 | Stop reason: HOSPADM

## 2023-05-03 RX ORDER — ACETAMINOPHEN 325 MG/1
650 TABLET ORAL
Status: DISCONTINUED | OUTPATIENT
Start: 2023-05-03 | End: 2023-05-05 | Stop reason: HOSPADM

## 2023-05-03 RX ORDER — CALCIUM GLUCONATE 20 MG/ML
2 INJECTION, SOLUTION INTRAVENOUS ONCE
Status: COMPLETED | OUTPATIENT
Start: 2023-05-03 | End: 2023-05-03

## 2023-05-03 RX ORDER — POLYETHYLENE GLYCOL 3350 17 G/17G
17 POWDER, FOR SOLUTION ORAL 2 TIMES DAILY
Status: DISCONTINUED | OUTPATIENT
Start: 2023-05-03 | End: 2023-05-05 | Stop reason: HOSPADM

## 2023-05-03 RX ORDER — FLUTICASONE PROPIONATE 50 MCG
2 SPRAY, SUSPENSION (ML) NASAL DAILY
Status: DISCONTINUED | OUTPATIENT
Start: 2023-05-04 | End: 2023-05-05 | Stop reason: HOSPADM

## 2023-05-03 RX ORDER — ALBUTEROL SULFATE 90 UG/1
1 AEROSOL, METERED RESPIRATORY (INHALATION)
Status: DISCONTINUED | OUTPATIENT
Start: 2023-05-03 | End: 2023-05-03

## 2023-05-03 RX ORDER — EPINEPHRINE 0.3 MG/.3ML
0.3 INJECTION SUBCUTANEOUS
Status: DISCONTINUED | OUTPATIENT
Start: 2023-05-03 | End: 2023-05-03

## 2023-05-03 RX ORDER — TRAZODONE HYDROCHLORIDE 50 MG/1
50 TABLET ORAL
Status: DISCONTINUED | OUTPATIENT
Start: 2023-05-03 | End: 2023-05-05 | Stop reason: HOSPADM

## 2023-05-03 RX ORDER — IPRATROPIUM BROMIDE AND ALBUTEROL SULFATE 2.5; .5 MG/3ML; MG/3ML
3 SOLUTION RESPIRATORY (INHALATION) 3 TIMES DAILY
Status: DISCONTINUED | OUTPATIENT
Start: 2023-05-03 | End: 2023-05-05 | Stop reason: HOSPADM

## 2023-05-03 RX ORDER — PREDNISONE 5 MG/1
5 TABLET ORAL DAILY
Status: DISCONTINUED | OUTPATIENT
Start: 2023-05-04 | End: 2023-05-05 | Stop reason: HOSPADM

## 2023-05-03 RX ORDER — CARVEDILOL 12.5 MG/1
25 TABLET ORAL 2 TIMES DAILY WITH MEALS
Status: DISCONTINUED | OUTPATIENT
Start: 2023-05-03 | End: 2023-05-04

## 2023-05-03 RX ORDER — MECLIZINE HCL 12.5 MG 12.5 MG/1
25 TABLET ORAL
Status: DISCONTINUED | OUTPATIENT
Start: 2023-05-03 | End: 2023-05-05 | Stop reason: HOSPADM

## 2023-05-03 RX ORDER — LEVOTHYROXINE SODIUM 75 UG/1
75 TABLET ORAL
Status: COMPLETED | OUTPATIENT
Start: 2023-05-03 | End: 2023-05-03

## 2023-05-03 RX ORDER — HYDRALAZINE HYDROCHLORIDE 25 MG/1
25 TABLET, FILM COATED ORAL 3 TIMES DAILY
Status: DISCONTINUED | OUTPATIENT
Start: 2023-05-03 | End: 2023-05-04

## 2023-05-03 RX ORDER — BUDESONIDE AND FORMOTEROL FUMARATE DIHYDRATE 160; 4.5 UG/1; UG/1
2 AEROSOL RESPIRATORY (INHALATION)
Status: DISCONTINUED | OUTPATIENT
Start: 2023-05-03 | End: 2023-05-05 | Stop reason: HOSPADM

## 2023-05-03 RX ORDER — SODIUM CHLORIDE 0.9 % (FLUSH) 0.9 %
5-40 SYRINGE (ML) INJECTION AS NEEDED
Status: DISCONTINUED | OUTPATIENT
Start: 2023-05-03 | End: 2023-05-05 | Stop reason: SDUPTHER

## 2023-05-03 RX ORDER — POLYETHYLENE GLYCOL 3350 17 G/17G
17 POWDER, FOR SOLUTION ORAL DAILY PRN
Status: DISCONTINUED | OUTPATIENT
Start: 2023-05-03 | End: 2023-05-05 | Stop reason: SDUPTHER

## 2023-05-03 RX ORDER — DIPHENHYDRAMINE HCL 25 MG
25 CAPSULE ORAL
Status: DISCONTINUED | OUTPATIENT
Start: 2023-05-03 | End: 2023-05-05 | Stop reason: HOSPADM

## 2023-05-03 RX ORDER — ATORVASTATIN CALCIUM 10 MG/1
10 TABLET, FILM COATED ORAL
Status: DISCONTINUED | OUTPATIENT
Start: 2023-05-03 | End: 2023-05-05 | Stop reason: HOSPADM

## 2023-05-03 RX ORDER — LEVOTHYROXINE SODIUM 75 UG/1
75 TABLET ORAL
Status: DISCONTINUED | OUTPATIENT
Start: 2023-05-04 | End: 2023-05-05 | Stop reason: HOSPADM

## 2023-05-03 RX ADMIN — ATORVASTATIN CALCIUM 10 MG: 10 TABLET, FILM COATED ORAL at 21:19

## 2023-05-03 RX ADMIN — CARVEDILOL 25 MG: 12.5 TABLET, FILM COATED ORAL at 15:30

## 2023-05-03 RX ADMIN — HYDRALAZINE HYDROCHLORIDE 25 MG: 25 TABLET, FILM COATED ORAL at 22:09

## 2023-05-03 RX ADMIN — POLYETHYLENE GLYCOL 3350 17 G: 17 POWDER, FOR SOLUTION ORAL at 21:18

## 2023-05-03 RX ADMIN — IPRATROPIUM BROMIDE AND ALBUTEROL SULFATE 3 ML: .5; 3 SOLUTION RESPIRATORY (INHALATION) at 20:17

## 2023-05-03 RX ADMIN — SEVELAMER CARBONATE 800 MG: 800 TABLET, FILM COATED ORAL at 21:19

## 2023-05-03 RX ADMIN — DIPHENHYDRAMINE HYDROCHLORIDE 25 MG: 25 CAPSULE ORAL at 21:19

## 2023-05-03 RX ADMIN — BUDESONIDE AND FORMOTEROL FUMARATE DIHYDRATE 2 PUFF: 160; 4.5 AEROSOL RESPIRATORY (INHALATION) at 20:17

## 2023-05-03 RX ADMIN — CALCIUM GLUCONATE 2 G: 20 INJECTION, SOLUTION INTRAVENOUS at 15:09

## 2023-05-03 RX ADMIN — MAGNESIUM SULFATE HEPTAHYDRATE 1 G: 1 INJECTION, SOLUTION INTRAVENOUS at 13:13

## 2023-05-03 RX ADMIN — TRAZODONE HYDROCHLORIDE 50 MG: 50 TABLET ORAL at 21:19

## 2023-05-03 RX ADMIN — HEPARIN SODIUM 5000 UNITS: 5000 INJECTION INTRAVENOUS; SUBCUTANEOUS at 21:19

## 2023-05-03 RX ADMIN — LEVOTHYROXINE SODIUM 75 MCG: 0.07 TABLET ORAL at 17:02

## 2023-05-03 RX ADMIN — IPRATROPIUM BROMIDE AND ALBUTEROL SULFATE 3 ML: 2.5; .5 SOLUTION RESPIRATORY (INHALATION) at 20:17

## 2023-05-03 RX ADMIN — SODIUM CHLORIDE, PRESERVATIVE FREE 10 ML: 5 INJECTION INTRAVENOUS at 21:19

## 2023-05-04 ENCOUNTER — APPOINTMENT (OUTPATIENT)
Dept: GENERAL RADIOLOGY | Age: 68
End: 2023-05-04
Attending: INTERNAL MEDICINE
Payer: MEDICARE

## 2023-05-04 ENCOUNTER — CARE COORDINATION (OUTPATIENT)
Facility: CLINIC | Age: 68
End: 2023-05-04

## 2023-05-04 LAB
ABO + RH BLD: NORMAL
ANION GAP SERPL CALC-SCNC: 7 MMOL/L (ref 5–15)
ATRIAL RATE: 64 BPM
BLD PROD TYP BPU: NORMAL
BLD PROD TYP BPU: NORMAL
BLOOD GROUP ANTIBODIES SERPL: NEGATIVE
BPU ID: NORMAL
BPU ID: NORMAL
BUN SERPL-MCNC: 33 MG/DL (ref 6–20)
BUN/CREAT SERPL: 4 (ref 12–20)
CA-I BLD-MCNC: 7.7 MG/DL (ref 8.5–10.1)
CALCULATED P AXIS, ECG09: 59 DEGREES
CALCULATED R AXIS, ECG10: 72 DEGREES
CALCULATED T AXIS, ECG11: 56 DEGREES
CHLORIDE SERPL-SCNC: 103 MMOL/L (ref 97–108)
CO2 SERPL-SCNC: 26 MMOL/L (ref 21–32)
CREAT SERPL-MCNC: 7.35 MG/DL (ref 0.55–1.02)
CROSSMATCH RESULT,%XM: NORMAL
CROSSMATCH RESULT,%XM: NORMAL
DIAGNOSIS, 93000: NORMAL
ERYTHROCYTE [DISTWIDTH] IN BLOOD BY AUTOMATED COUNT: 21 % (ref 11.5–14.5)
GLUCOSE SERPL-MCNC: 89 MG/DL (ref 65–100)
HCT VFR BLD AUTO: 30.1 % (ref 35–47)
HGB BLD-MCNC: 9.9 G/DL (ref 11.5–16)
MAGNESIUM SERPL-MCNC: 2.6 MG/DL (ref 1.6–2.4)
MCH RBC QN AUTO: 31.5 PG (ref 26–34)
MCHC RBC AUTO-ENTMCNC: 32.9 G/DL (ref 30–36.5)
MCV RBC AUTO: 95.9 FL (ref 80–99)
NRBC # BLD: 0 K/UL (ref 0–0.01)
NRBC BLD-RTO: 0 PER 100 WBC
P-R INTERVAL, ECG05: 180 MS
PLATELET # BLD AUTO: 164 K/UL (ref 150–400)
PMV BLD AUTO: 9.7 FL (ref 8.9–12.9)
POTASSIUM SERPL-SCNC: 4 MMOL/L (ref 3.5–5.1)
Q-T INTERVAL, ECG07: 524 MS
QRS DURATION, ECG06: 114 MS
QTC CALCULATION (BEZET), ECG08: 540 MS
RBC # BLD AUTO: 3.14 M/UL (ref 3.8–5.2)
SODIUM SERPL-SCNC: 136 MMOL/L (ref 136–145)
SPECIMEN EXP DATE BLD: NORMAL
STATUS OF UNIT,%ST: NORMAL
STATUS OF UNIT,%ST: NORMAL
TRANSFUSION STATUS PATIENT QL: NORMAL
TRANSFUSION STATUS PATIENT QL: NORMAL
UNIT DIVISION, %UDIV: 0
UNIT DIVISION, %UDIV: 0
VENTRICULAR RATE, ECG03: 64 BPM
WBC # BLD AUTO: 7.6 K/UL (ref 3.6–11)

## 2023-05-04 PROCEDURE — 2709999900 HC NON-CHARGEABLE SUPPLY: Performed by: INTERNAL MEDICINE

## 2023-05-04 PROCEDURE — C1769 GUIDE WIRE: HCPCS | Performed by: INTERNAL MEDICINE

## 2023-05-04 PROCEDURE — 74011000250 HC RX REV CODE- 250: Performed by: INTERNAL MEDICINE

## 2023-05-04 PROCEDURE — 74011250636 HC RX REV CODE- 250/636: Performed by: INTERNAL MEDICINE

## 2023-05-04 PROCEDURE — C1722 AICD, SINGLE CHAMBER: HCPCS | Performed by: INTERNAL MEDICINE

## 2023-05-04 PROCEDURE — 76937 US GUIDE VASCULAR ACCESS: CPT | Performed by: INTERNAL MEDICINE

## 2023-05-04 PROCEDURE — 94761 N-INVAS EAR/PLS OXIMETRY MLT: CPT

## 2023-05-04 PROCEDURE — 77030018729 HC ELECTRD DEFIB PAD CARD -B: Performed by: INTERNAL MEDICINE

## 2023-05-04 PROCEDURE — C1781 MESH (IMPLANTABLE): HCPCS | Performed by: INTERNAL MEDICINE

## 2023-05-04 PROCEDURE — 77030002912 HC SUT ETHBND J&J -A: Performed by: INTERNAL MEDICINE

## 2023-05-04 PROCEDURE — 80048 BASIC METABOLIC PNL TOTAL CA: CPT

## 2023-05-04 PROCEDURE — 77030019580 HC CBL PACE MEDT -B: Performed by: INTERNAL MEDICINE

## 2023-05-04 PROCEDURE — C1893 INTRO/SHEATH, FIXED,NON-PEEL: HCPCS | Performed by: INTERNAL MEDICINE

## 2023-05-04 PROCEDURE — 77030022017 HC DRSG HEMO QCLOT ZMED -A: Performed by: INTERNAL MEDICINE

## 2023-05-04 PROCEDURE — 36415 COLL VENOUS BLD VENIPUNCTURE: CPT

## 2023-05-04 PROCEDURE — A4565 SLINGS: HCPCS | Performed by: INTERNAL MEDICINE

## 2023-05-04 PROCEDURE — 74011000258 HC RX REV CODE- 258: Performed by: INTERNAL MEDICINE

## 2023-05-04 PROCEDURE — G0378 HOSPITAL OBSERVATION PER HR: HCPCS

## 2023-05-04 PROCEDURE — C1892 INTRO/SHEATH,FIXED,PEEL-AWAY: HCPCS | Performed by: INTERNAL MEDICINE

## 2023-05-04 PROCEDURE — 77030031139 HC SUT VCRL2 J&J -A: Performed by: INTERNAL MEDICINE

## 2023-05-04 PROCEDURE — 76210000006 HC OR PH I REC 0.5 TO 1 HR: Performed by: INTERNAL MEDICINE

## 2023-05-04 PROCEDURE — C1887 CATHETER, GUIDING: HCPCS | Performed by: INTERNAL MEDICINE

## 2023-05-04 PROCEDURE — 77030041700 HC CATH BLLN WDG PRES TELE -B: Performed by: INTERNAL MEDICINE

## 2023-05-04 PROCEDURE — 83735 ASSAY OF MAGNESIUM: CPT

## 2023-05-04 PROCEDURE — C1777 LEAD, AICD, ENDO SINGLE COIL: HCPCS | Performed by: INTERNAL MEDICINE

## 2023-05-04 PROCEDURE — 74011250637 HC RX REV CODE- 250/637: Performed by: INTERNAL MEDICINE

## 2023-05-04 PROCEDURE — 99153 MOD SED SAME PHYS/QHP EA: CPT | Performed by: INTERNAL MEDICINE

## 2023-05-04 PROCEDURE — 33249 INSJ/RPLCMT DEFIB W/LEAD(S): CPT | Performed by: INTERNAL MEDICINE

## 2023-05-04 PROCEDURE — C1898 LEAD, PMKR, OTHER THAN TRANS: HCPCS | Performed by: INTERNAL MEDICINE

## 2023-05-04 PROCEDURE — 77030010507 HC ADH SKN DERMBND J&J -B: Performed by: INTERNAL MEDICINE

## 2023-05-04 PROCEDURE — 74011636637 HC RX REV CODE- 636/637: Performed by: INTERNAL MEDICINE

## 2023-05-04 PROCEDURE — 93005 ELECTROCARDIOGRAM TRACING: CPT

## 2023-05-04 PROCEDURE — 99152 MOD SED SAME PHYS/QHP 5/>YRS: CPT | Performed by: INTERNAL MEDICINE

## 2023-05-04 PROCEDURE — 94640 AIRWAY INHALATION TREATMENT: CPT

## 2023-05-04 PROCEDURE — 71045 X-RAY EXAM CHEST 1 VIEW: CPT

## 2023-05-04 PROCEDURE — 93283 PRGRMG EVAL IMPLANTABLE DFB: CPT | Performed by: INTERNAL MEDICINE

## 2023-05-04 PROCEDURE — 77030037713 HC CLOSR DEV INCIS ZIP STRY -B: Performed by: INTERNAL MEDICINE

## 2023-05-04 PROCEDURE — 85027 COMPLETE CBC AUTOMATED: CPT

## 2023-05-04 DEVICE — ENVELOPE CMRM6133 ABSORB LRG MR
Type: IMPLANTABLE DEVICE | Status: FUNCTIONAL
Brand: TYRX™

## 2023-05-04 DEVICE — LEAD PACE BPLR 6 FRX45 CM STR TIP IS-1 CAPSUR FIX NOVUS: Type: IMPLANTABLE DEVICE | Status: FUNCTIONAL

## 2023-05-04 DEVICE — ICD COBALT XT DR MRI DF4 2 CHMBR: Type: IMPLANTABLE DEVICE | Status: FUNCTIONAL

## 2023-05-04 DEVICE — IMPLANTABLE DEVICE: Type: IMPLANTABLE DEVICE | Status: FUNCTIONAL

## 2023-05-04 RX ORDER — HYDRALAZINE HYDROCHLORIDE 20 MG/ML
INJECTION INTRAMUSCULAR; INTRAVENOUS AS NEEDED
Status: DISCONTINUED | OUTPATIENT
Start: 2023-05-04 | End: 2023-05-04 | Stop reason: HOSPADM

## 2023-05-04 RX ORDER — LABETALOL HCL 20 MG/4 ML
SYRINGE (ML) INTRAVENOUS AS NEEDED
Status: DISCONTINUED | OUTPATIENT
Start: 2023-05-04 | End: 2023-05-04 | Stop reason: HOSPADM

## 2023-05-04 RX ORDER — NIFEDIPINE 30 MG/1
30 TABLET, EXTENDED RELEASE ORAL DAILY
Status: DISCONTINUED | OUTPATIENT
Start: 2023-05-04 | End: 2023-05-05 | Stop reason: HOSPADM

## 2023-05-04 RX ORDER — ACETAMINOPHEN 325 MG/1
650 TABLET ORAL
Status: DISCONTINUED | OUTPATIENT
Start: 2023-05-04 | End: 2023-05-04 | Stop reason: SDUPTHER

## 2023-05-04 RX ORDER — MIDAZOLAM HYDROCHLORIDE 1 MG/ML
INJECTION INTRAMUSCULAR; INTRAVENOUS AS NEEDED
Status: DISCONTINUED | OUTPATIENT
Start: 2023-05-04 | End: 2023-05-04 | Stop reason: HOSPADM

## 2023-05-04 RX ORDER — DIPHENHYDRAMINE HYDROCHLORIDE 50 MG/ML
INJECTION, SOLUTION INTRAMUSCULAR; INTRAVENOUS AS NEEDED
Status: DISCONTINUED | OUTPATIENT
Start: 2023-05-04 | End: 2023-05-04 | Stop reason: HOSPADM

## 2023-05-04 RX ORDER — CARVEDILOL 12.5 MG/1
50 TABLET ORAL 2 TIMES DAILY WITH MEALS
Status: DISCONTINUED | OUTPATIENT
Start: 2023-05-04 | End: 2023-05-05 | Stop reason: HOSPADM

## 2023-05-04 RX ORDER — FENTANYL CITRATE 50 UG/ML
INJECTION, SOLUTION INTRAMUSCULAR; INTRAVENOUS AS NEEDED
Status: DISCONTINUED | OUTPATIENT
Start: 2023-05-04 | End: 2023-05-04 | Stop reason: HOSPADM

## 2023-05-04 RX ORDER — HYDROMORPHONE HYDROCHLORIDE 1 MG/ML
0.5 INJECTION, SOLUTION INTRAMUSCULAR; INTRAVENOUS; SUBCUTANEOUS ONCE
Status: COMPLETED | OUTPATIENT
Start: 2023-05-04 | End: 2023-05-04

## 2023-05-04 RX ORDER — CARVEDILOL 12.5 MG/1
37.5 TABLET ORAL 2 TIMES DAILY WITH MEALS
Status: DISCONTINUED | OUTPATIENT
Start: 2023-05-04 | End: 2023-05-04

## 2023-05-04 RX ORDER — SODIUM CHLORIDE 0.9 % (FLUSH) 0.9 %
5-40 SYRINGE (ML) INJECTION EVERY 8 HOURS
Status: DISCONTINUED | OUTPATIENT
Start: 2023-05-04 | End: 2023-05-05 | Stop reason: HOSPADM

## 2023-05-04 RX ORDER — LIDOCAINE HYDROCHLORIDE 10 MG/ML
INJECTION INFILTRATION; PERINEURAL AS NEEDED
Status: DISCONTINUED | OUTPATIENT
Start: 2023-05-04 | End: 2023-05-04 | Stop reason: HOSPADM

## 2023-05-04 RX ORDER — CARVEDILOL 12.5 MG/1
50 TABLET ORAL 2 TIMES DAILY WITH MEALS
Status: DISCONTINUED | OUTPATIENT
Start: 2023-05-05 | End: 2023-05-04

## 2023-05-04 RX ORDER — HYDRALAZINE HYDROCHLORIDE 25 MG/1
25 TABLET, FILM COATED ORAL
Status: DISCONTINUED | OUTPATIENT
Start: 2023-05-04 | End: 2023-05-05 | Stop reason: HOSPADM

## 2023-05-04 RX ORDER — SODIUM CHLORIDE 0.9 % (FLUSH) 0.9 %
5-40 SYRINGE (ML) INJECTION AS NEEDED
Status: DISCONTINUED | OUTPATIENT
Start: 2023-05-04 | End: 2023-05-05 | Stop reason: HOSPADM

## 2023-05-04 RX ORDER — HYDROMORPHONE HYDROCHLORIDE 1 MG/ML
0.5 INJECTION, SOLUTION INTRAMUSCULAR; INTRAVENOUS; SUBCUTANEOUS ONCE
Status: ACTIVE | OUTPATIENT
Start: 2023-05-04 | End: 2023-05-05

## 2023-05-04 RX ORDER — TRAMADOL HYDROCHLORIDE 50 MG/1
50 TABLET ORAL
Status: DISCONTINUED | OUTPATIENT
Start: 2023-05-04 | End: 2023-05-05 | Stop reason: HOSPADM

## 2023-05-04 RX ADMIN — MONTELUKAST 10 MG: 10 TABLET, FILM COATED ORAL at 11:32

## 2023-05-04 RX ADMIN — TRAZODONE HYDROCHLORIDE 50 MG: 50 TABLET ORAL at 21:27

## 2023-05-04 RX ADMIN — CARVEDILOL 50 MG: 12.5 TABLET, FILM COATED ORAL at 17:29

## 2023-05-04 RX ADMIN — LOSARTAN POTASSIUM 50 MG: 50 TABLET, FILM COATED ORAL at 11:32

## 2023-05-04 RX ADMIN — SODIUM CHLORIDE, PRESERVATIVE FREE 10 ML: 5 INJECTION INTRAVENOUS at 21:27

## 2023-05-04 RX ADMIN — SODIUM CHLORIDE, PRESERVATIVE FREE 10 ML: 5 INJECTION INTRAVENOUS at 14:08

## 2023-05-04 RX ADMIN — SODIUM CHLORIDE, PRESERVATIVE FREE 10 ML: 5 INJECTION INTRAVENOUS at 22:02

## 2023-05-04 RX ADMIN — PREDNISONE 5 MG: 5 TABLET ORAL at 11:32

## 2023-05-04 RX ADMIN — SODIUM CHLORIDE, PRESERVATIVE FREE 10 ML: 5 INJECTION INTRAVENOUS at 05:06

## 2023-05-04 RX ADMIN — PANTOPRAZOLE SODIUM 40 MG: 40 TABLET, DELAYED RELEASE ORAL at 11:32

## 2023-05-04 RX ADMIN — LEVOTHYROXINE SODIUM 75 MCG: 75 TABLET ORAL at 11:32

## 2023-05-04 RX ADMIN — TRAMADOL HYDROCHLORIDE 50 MG: 50 TABLET, COATED ORAL at 21:27

## 2023-05-04 RX ADMIN — IPRATROPIUM BROMIDE AND ALBUTEROL SULFATE 3 ML: 2.5; .5 SOLUTION RESPIRATORY (INHALATION) at 20:49

## 2023-05-04 RX ADMIN — CARVEDILOL 37.5 MG: 12.5 TABLET, FILM COATED ORAL at 11:32

## 2023-05-04 RX ADMIN — BUDESONIDE AND FORMOTEROL FUMARATE DIHYDRATE 2 PUFF: 160; 4.5 AEROSOL RESPIRATORY (INHALATION) at 20:58

## 2023-05-04 RX ADMIN — SEVELAMER CARBONATE 800 MG: 800 TABLET, FILM COATED ORAL at 11:32

## 2023-05-04 RX ADMIN — TRAMADOL HYDROCHLORIDE 50 MG: 50 TABLET, COATED ORAL at 11:32

## 2023-05-04 RX ADMIN — HYDROMORPHONE HYDROCHLORIDE 0.5 MG: 1 INJECTION, SOLUTION INTRAMUSCULAR; INTRAVENOUS; SUBCUTANEOUS at 14:36

## 2023-05-04 RX ADMIN — SEVELAMER CARBONATE 800 MG: 800 TABLET, FILM COATED ORAL at 21:27

## 2023-05-04 RX ADMIN — SODIUM CHLORIDE, PRESERVATIVE FREE 10 ML: 5 INJECTION INTRAVENOUS at 14:00

## 2023-05-04 RX ADMIN — SODIUM CHLORIDE, PRESERVATIVE FREE 10 ML: 5 INJECTION INTRAVENOUS at 11:36

## 2023-05-04 RX ADMIN — FLUTICASONE PROPIONATE 2 SPRAY: 50 SPRAY, METERED NASAL at 12:01

## 2023-05-04 RX ADMIN — ATORVASTATIN CALCIUM 10 MG: 10 TABLET, FILM COATED ORAL at 21:27

## 2023-05-04 RX ADMIN — CINACALCET HYDROCHLORIDE 30 MG: 30 TABLET, FILM COATED ORAL at 11:32

## 2023-05-04 RX ADMIN — NIFEDIPINE 30 MG: 30 TABLET, FILM COATED, EXTENDED RELEASE ORAL at 11:36

## 2023-05-05 ENCOUNTER — APPOINTMENT (OUTPATIENT)
Dept: GENERAL RADIOLOGY | Age: 68
End: 2023-05-05
Attending: INTERNAL MEDICINE
Payer: MEDICARE

## 2023-05-05 VITALS
DIASTOLIC BLOOD PRESSURE: 68 MMHG | HEART RATE: 77 BPM | SYSTOLIC BLOOD PRESSURE: 176 MMHG | WEIGHT: 144.62 LBS | RESPIRATION RATE: 16 BRPM | HEIGHT: 61 IN | TEMPERATURE: 98.5 F | OXYGEN SATURATION: 97 % | BODY MASS INDEX: 27.3 KG/M2

## 2023-05-05 LAB
ANION GAP SERPL CALC-SCNC: 6 MMOL/L (ref 5–15)
BUN SERPL-MCNC: 42 MG/DL (ref 6–20)
BUN/CREAT SERPL: 5 (ref 12–20)
CA-I BLD-MCNC: 7.6 MG/DL (ref 8.5–10.1)
CHLORIDE SERPL-SCNC: 104 MMOL/L (ref 97–108)
CO2 SERPL-SCNC: 26 MMOL/L (ref 21–32)
CREAT SERPL-MCNC: 8.51 MG/DL (ref 0.55–1.02)
ERYTHROCYTE [DISTWIDTH] IN BLOOD BY AUTOMATED COUNT: 19.7 % (ref 11.5–14.5)
GLUCOSE SERPL-MCNC: 87 MG/DL (ref 65–100)
HCT VFR BLD AUTO: 29.7 % (ref 35–47)
HGB BLD-MCNC: 10 G/DL (ref 11.5–16)
MCH RBC QN AUTO: 31.9 PG (ref 26–34)
MCHC RBC AUTO-ENTMCNC: 33.7 G/DL (ref 30–36.5)
MCV RBC AUTO: 94.9 FL (ref 80–99)
NRBC # BLD: 0 K/UL (ref 0–0.01)
NRBC BLD-RTO: 0 PER 100 WBC
PLATELET # BLD AUTO: 152 K/UL (ref 150–400)
PMV BLD AUTO: 9.8 FL (ref 8.9–12.9)
POTASSIUM SERPL-SCNC: 4.4 MMOL/L (ref 3.5–5.1)
RBC # BLD AUTO: 3.13 M/UL (ref 3.8–5.2)
SODIUM SERPL-SCNC: 136 MMOL/L (ref 136–145)
WBC # BLD AUTO: 7.7 K/UL (ref 3.6–11)

## 2023-05-05 PROCEDURE — 36415 COLL VENOUS BLD VENIPUNCTURE: CPT

## 2023-05-05 PROCEDURE — 80048 BASIC METABOLIC PNL TOTAL CA: CPT

## 2023-05-05 PROCEDURE — 74011250637 HC RX REV CODE- 250/637

## 2023-05-05 PROCEDURE — 85027 COMPLETE CBC AUTOMATED: CPT

## 2023-05-05 PROCEDURE — 74011250636 HC RX REV CODE- 250/636: Performed by: INTERNAL MEDICINE

## 2023-05-05 PROCEDURE — 74011636637 HC RX REV CODE- 636/637: Performed by: INTERNAL MEDICINE

## 2023-05-05 PROCEDURE — G0378 HOSPITAL OBSERVATION PER HR: HCPCS

## 2023-05-05 PROCEDURE — 74011250637 HC RX REV CODE- 250/637: Performed by: INTERNAL MEDICINE

## 2023-05-05 PROCEDURE — G0257 UNSCHED DIALYSIS ESRD PT HOS: HCPCS

## 2023-05-05 PROCEDURE — 74011000250 HC RX REV CODE- 250: Performed by: INTERNAL MEDICINE

## 2023-05-05 PROCEDURE — 71046 X-RAY EXAM CHEST 2 VIEWS: CPT

## 2023-05-05 RX ORDER — OXYCODONE AND ACETAMINOPHEN 5; 325 MG/1; MG/1
1 TABLET ORAL
Qty: 12 TABLET | Refills: 0 | Status: SHIPPED | OUTPATIENT
Start: 2023-05-05 | End: 2023-05-08

## 2023-05-05 RX ORDER — ACETAMINOPHEN 325 MG/1
TABLET ORAL
Status: COMPLETED
Start: 2023-05-05 | End: 2023-05-05

## 2023-05-05 RX ORDER — HYDRALAZINE HYDROCHLORIDE 20 MG/ML
10 INJECTION INTRAMUSCULAR; INTRAVENOUS ONCE
Status: COMPLETED | OUTPATIENT
Start: 2023-05-05 | End: 2023-05-05

## 2023-05-05 RX ORDER — NIFEDIPINE 30 MG/1
30 TABLET, EXTENDED RELEASE ORAL DAILY
Qty: 30 TABLET | Refills: 0 | Status: SHIPPED | OUTPATIENT
Start: 2023-05-05

## 2023-05-05 RX ORDER — OXYCODONE AND ACETAMINOPHEN 5; 325 MG/1; MG/1
1 TABLET ORAL
Status: DISCONTINUED | OUTPATIENT
Start: 2023-05-05 | End: 2023-05-05 | Stop reason: HOSPADM

## 2023-05-05 RX ORDER — ACETAMINOPHEN 325 MG/1
650 TABLET ORAL
Qty: 60 TABLET | Refills: 0 | Status: SHIPPED | OUTPATIENT
Start: 2023-05-05

## 2023-05-05 RX ORDER — BUDESONIDE AND FORMOTEROL FUMARATE DIHYDRATE 160; 4.5 UG/1; UG/1
2 AEROSOL RESPIRATORY (INHALATION) 2 TIMES DAILY
Qty: 10.2 G | Refills: 0 | Status: SHIPPED | OUTPATIENT
Start: 2023-05-05

## 2023-05-05 RX ADMIN — SODIUM CHLORIDE, PRESERVATIVE FREE 10 ML: 5 INJECTION INTRAVENOUS at 14:18

## 2023-05-05 RX ADMIN — SODIUM CHLORIDE, PRESERVATIVE FREE 10 ML: 5 INJECTION INTRAVENOUS at 05:36

## 2023-05-05 RX ADMIN — LOSARTAN POTASSIUM 50 MG: 50 TABLET, FILM COATED ORAL at 14:14

## 2023-05-05 RX ADMIN — OXYCODONE HYDROCHLORIDE AND ACETAMINOPHEN 1 TABLET: 5; 325 TABLET ORAL at 14:13

## 2023-05-05 RX ADMIN — HYDRALAZINE HYDROCHLORIDE 10 MG: 20 INJECTION, SOLUTION INTRAMUSCULAR; INTRAVENOUS at 04:29

## 2023-05-05 RX ADMIN — PREDNISONE 5 MG: 5 TABLET ORAL at 14:14

## 2023-05-05 RX ADMIN — CINACALCET HYDROCHLORIDE 30 MG: 30 TABLET, FILM COATED ORAL at 14:14

## 2023-05-05 RX ADMIN — ACETAMINOPHEN 650 MG: 325 TABLET ORAL at 11:20

## 2023-05-05 RX ADMIN — MONTELUKAST 10 MG: 10 TABLET, FILM COATED ORAL at 14:14

## 2023-05-05 RX ADMIN — HYDRALAZINE HYDROCHLORIDE 25 MG: 25 TABLET, FILM COATED ORAL at 00:07

## 2023-05-05 RX ADMIN — SODIUM CHLORIDE, PRESERVATIVE FREE 10 ML: 5 INJECTION INTRAVENOUS at 05:37

## 2023-05-05 RX ADMIN — SEVELAMER CARBONATE 800 MG: 800 TABLET, FILM COATED ORAL at 14:14

## 2023-05-05 RX ADMIN — EPOETIN ALFA-EPBX 4000 UNITS: 4000 INJECTION, SOLUTION INTRAVENOUS; SUBCUTANEOUS at 11:54

## 2023-05-05 RX ADMIN — NIFEDIPINE 30 MG: 30 TABLET, FILM COATED, EXTENDED RELEASE ORAL at 14:14

## 2023-05-11 ENCOUNTER — CARE COORDINATION (OUTPATIENT)
Facility: CLINIC | Age: 68
End: 2023-05-11

## 2023-05-26 ENCOUNTER — CARE COORDINATION (OUTPATIENT)
Facility: CLINIC | Age: 68
End: 2023-05-26

## 2023-05-31 ENCOUNTER — OFFICE VISIT (OUTPATIENT)
Facility: CLINIC | Age: 68
End: 2023-05-31
Payer: MEDICARE

## 2023-05-31 VITALS
OXYGEN SATURATION: 99 % | BODY MASS INDEX: 25.17 KG/M2 | HEIGHT: 61 IN | HEART RATE: 71 BPM | TEMPERATURE: 98.8 F | WEIGHT: 133.3 LBS | DIASTOLIC BLOOD PRESSURE: 79 MMHG | SYSTOLIC BLOOD PRESSURE: 159 MMHG

## 2023-05-31 DIAGNOSIS — Z86.718 H/O DEEP VENOUS THROMBOSIS: ICD-10-CM

## 2023-05-31 DIAGNOSIS — R63.0 APPETITE LOSS: ICD-10-CM

## 2023-05-31 DIAGNOSIS — Z95.810 S/P ICD (INTERNAL CARDIAC DEFIBRILLATOR) PROCEDURE: ICD-10-CM

## 2023-05-31 DIAGNOSIS — F51.01 PRIMARY INSOMNIA: ICD-10-CM

## 2023-05-31 DIAGNOSIS — I50.32 CHRONIC DIASTOLIC (CONGESTIVE) HEART FAILURE (HCC): ICD-10-CM

## 2023-05-31 DIAGNOSIS — Z12.4 CERVICAL CANCER SCREENING: ICD-10-CM

## 2023-05-31 DIAGNOSIS — I10 UNCONTROLLED HYPERTENSION: Primary | ICD-10-CM

## 2023-05-31 PROBLEM — I82.411 ACUTE EMBOLISM AND THROMBOSIS OF RIGHT FEMORAL VEIN (HCC): Status: ACTIVE | Noted: 2023-05-31

## 2023-05-31 PROCEDURE — 1123F ACP DISCUSS/DSCN MKR DOCD: CPT | Performed by: STUDENT IN AN ORGANIZED HEALTH CARE EDUCATION/TRAINING PROGRAM

## 2023-05-31 PROCEDURE — 1090F PRES/ABSN URINE INCON ASSESS: CPT | Performed by: STUDENT IN AN ORGANIZED HEALTH CARE EDUCATION/TRAINING PROGRAM

## 2023-05-31 PROCEDURE — 3077F SYST BP >= 140 MM HG: CPT | Performed by: STUDENT IN AN ORGANIZED HEALTH CARE EDUCATION/TRAINING PROGRAM

## 2023-05-31 PROCEDURE — G8399 PT W/DXA RESULTS DOCUMENT: HCPCS | Performed by: STUDENT IN AN ORGANIZED HEALTH CARE EDUCATION/TRAINING PROGRAM

## 2023-05-31 PROCEDURE — 1036F TOBACCO NON-USER: CPT | Performed by: STUDENT IN AN ORGANIZED HEALTH CARE EDUCATION/TRAINING PROGRAM

## 2023-05-31 PROCEDURE — 3017F COLORECTAL CA SCREEN DOC REV: CPT | Performed by: STUDENT IN AN ORGANIZED HEALTH CARE EDUCATION/TRAINING PROGRAM

## 2023-05-31 PROCEDURE — 3078F DIAST BP <80 MM HG: CPT | Performed by: STUDENT IN AN ORGANIZED HEALTH CARE EDUCATION/TRAINING PROGRAM

## 2023-05-31 PROCEDURE — G8428 CUR MEDS NOT DOCUMENT: HCPCS | Performed by: STUDENT IN AN ORGANIZED HEALTH CARE EDUCATION/TRAINING PROGRAM

## 2023-05-31 PROCEDURE — 99214 OFFICE O/P EST MOD 30 MIN: CPT | Performed by: STUDENT IN AN ORGANIZED HEALTH CARE EDUCATION/TRAINING PROGRAM

## 2023-05-31 PROCEDURE — G8417 CALC BMI ABV UP PARAM F/U: HCPCS | Performed by: STUDENT IN AN ORGANIZED HEALTH CARE EDUCATION/TRAINING PROGRAM

## 2023-05-31 RX ORDER — SEVELAMER CARBONATE 800 MG/1
TABLET, FILM COATED ORAL
COMMUNITY
Start: 2023-05-29

## 2023-05-31 RX ORDER — BUDESONIDE AND FORMOTEROL FUMARATE DIHYDRATE 160; 4.5 UG/1; UG/1
2 AEROSOL RESPIRATORY (INHALATION) 2 TIMES DAILY
COMMUNITY
Start: 2023-05-05

## 2023-05-31 RX ORDER — HYDRALAZINE HYDROCHLORIDE 10 MG/1
10 TABLET, FILM COATED ORAL 3 TIMES DAILY
Qty: 270 TABLET | Refills: 3 | Status: SHIPPED | OUTPATIENT
Start: 2023-05-31 | End: 2023-08-29

## 2023-05-31 RX ORDER — ESTRADIOL 0.1 MG/G
CREAM VAGINAL
COMMUNITY
Start: 2023-05-12

## 2023-05-31 RX ORDER — B,C/FOLIC/ZINC/SELENOMETH/D3/E 0.8-12.5MG
1 TABLET ORAL DAILY
COMMUNITY
Start: 2023-05-29

## 2023-05-31 RX ORDER — NIFEDIPINE 90 MG/1
TABLET, FILM COATED, EXTENDED RELEASE ORAL
COMMUNITY
Start: 2023-05-25

## 2023-05-31 RX ORDER — MIRTAZAPINE 15 MG/1
15 TABLET, FILM COATED ORAL NIGHTLY
Qty: 90 TABLET | Refills: 3 | Status: SHIPPED | OUTPATIENT
Start: 2023-05-31

## 2023-05-31 ASSESSMENT — PATIENT HEALTH QUESTIONNAIRE - PHQ9
7. TROUBLE CONCENTRATING ON THINGS, SUCH AS READING THE NEWSPAPER OR WATCHING TELEVISION: 0
SUM OF ALL RESPONSES TO PHQ QUESTIONS 1-9: 9
5. POOR APPETITE OR OVEREATING: 1
3. TROUBLE FALLING OR STAYING ASLEEP: 3
SUM OF ALL RESPONSES TO PHQ QUESTIONS 1-9: 9
SUM OF ALL RESPONSES TO PHQ9 QUESTIONS 1 & 2: 2
2. FEELING DOWN, DEPRESSED OR HOPELESS: 1
4. FEELING TIRED OR HAVING LITTLE ENERGY: 1
10. IF YOU CHECKED OFF ANY PROBLEMS, HOW DIFFICULT HAVE THESE PROBLEMS MADE IT FOR YOU TO DO YOUR WORK, TAKE CARE OF THINGS AT HOME, OR GET ALONG WITH OTHER PEOPLE: 1
9. THOUGHTS THAT YOU WOULD BE BETTER OFF DEAD, OR OF HURTING YOURSELF: 0
1. LITTLE INTEREST OR PLEASURE IN DOING THINGS: 1
8. MOVING OR SPEAKING SO SLOWLY THAT OTHER PEOPLE COULD HAVE NOTICED. OR THE OPPOSITE, BEING SO FIGETY OR RESTLESS THAT YOU HAVE BEEN MOVING AROUND A LOT MORE THAN USUAL: 1
6. FEELING BAD ABOUT YOURSELF - OR THAT YOU ARE A FAILURE OR HAVE LET YOURSELF OR YOUR FAMILY DOWN: 1
SUM OF ALL RESPONSES TO PHQ QUESTIONS 1-9: 9
SUM OF ALL RESPONSES TO PHQ QUESTIONS 1-9: 9

## 2023-05-31 NOTE — PROGRESS NOTES
Sandrinehuong Roachamy presents today for   Chief Complaint   Patient presents with    Follow-Up from Hospital       Is someone accompanying this pt? Yes Yelena Cardoza     Is the patient using any DME equipment during OV? No     Health Maintenance reviewed and discussed and ordered per Provider. Health Maintenance Due   Topic Date Due    HIV screen  Never done    Hepatitis C screen  Never done    DTaP/Tdap/Td vaccine (1 - Tdap) Never done    Shingles vaccine (1 of 2) Never done    COVID-19 Vaccine (4 - Booster for Pfizer series) 05/18/2022    Lipids  01/20/2023    Annual Wellness Visit (AWV)  01/21/2023   . Coordination of Care:  1. \"Have you been to the ER, urgent care clinic since your last visit? Hospitalized since your last visit? \" Yes Hospitalized     2. \"Have you seen or consulted any other health care providers outside of the [address] [place] [address] since your last visit? \" No    3. For patients aged 39-70: Has the patient had a colonoscopy? Yes- No care gap present    If the patient is female:    4. For patients aged 41-77: Has the patient had a mammogram within the past 2 years? Yes- No care gap present    5. For patients aged 21-65: Has the patient had a pap smear?  Yes- No care gap present
glycol (GLYCOLAX) 17 GM/SCOOP powder Take 17 g by mouth 2 times daily 9/14/22  Yes Ar Automatic Reconciliation   predniSONE (DELTASONE) 5 MG tablet Take 1 tablet by mouth daily   Yes Ar Automatic Reconciliation   simvastatin (ZOCOR) 20 MG tablet TAKE 1 TABLET BY MOUTH DAILY AS DIRECTED. 7/27/22  Yes Ar Automatic Reconciliation   apixaban (ELIQUIS) 2.5 MG TABS tablet Take 1 tablet by mouth 2 times daily  Patient not taking: Reported on 5/31/2023 7/27/22   Ar Automatic Reconciliation     Allergies   Allergen Reactions    Aspirin Rash     Other reaction(s): Unknown (comments)    Bromfenac Rash     Other reaction(s): Unknown (comments)    Cefepime Other (See Comments)     Neurological reaction    Ceftriaxone Rash    Copper Rash    Ibuprofen Rash     Other reaction(s): Unknown (comments)    Ketorolac Tromethamine Rash     Other reaction(s): Unknown (comments)    Morphine      Other reaction(s): rash/itching, Unknown    Nabumetone Rash     Other reaction(s): Unknown (comments)    Rifampin Rash     Other reaction(s): Unknown (comments)    Sulfamethoxazole-Trimethoprim Rash     Other reaction(s): Unknown (comments)    Vancomycin Rash     Other reaction(s): Unknown (comments)  Pt reports causes itching, takes benadryl prior to use. Pt denies anaphylaxis. Requires benadryl with each vancomycin dose     Social History     Tobacco Use    Smoking status: Never    Smokeless tobacco: Never   Vaping Use    Vaping Use: Never used   Substance Use Topics    Alcohol use: No    Drug use: No        Review of Systems   As noted above in HPI.       Objective:   BP (!) 159/79   Pulse 71   Temp 98.8 °F (37.1 °C) (Temporal)   Ht 5' 1\" (1.549 m)   Wt 133 lb 4.8 oz (60.5 kg)   SpO2 99%   BMI 25.19 kg/m²      General: alert, oriented, not in distress  Chest/Lungs: clear breath sounds, no wheezing or crackles  Heart: normal rate, regular rhythm, no murmur  Extremities: no focal deformities, no edema  Skin: no active skin

## 2023-06-08 ENCOUNTER — CARE COORDINATION (OUTPATIENT)
Facility: CLINIC | Age: 68
End: 2023-06-08

## 2023-06-08 ASSESSMENT — PATIENT HEALTH QUESTIONNAIRE - PHQ9
SUM OF ALL RESPONSES TO PHQ QUESTIONS 1-9: 0

## 2023-06-08 NOTE — CARE COORDINATION
transportation or other): Some general dissatisfaction but no concern   How wells does the patient now understand their health and well-being (symptoms, signs or risk factors) and what they need to do to manage their health?: Reasonable to good understanding but do not feel able to engage with advice at this time   How well do you think your patient can engage in healthcare discussions? (Barriers include language, deafness, aphasia, alcohol or drug problems, learning difficulties, concentration): Adequate communication, with or without minor barriers   Do other services need to be involved to help this patient?: Other care/services not required at this time   Are current services involved with this patient well-coordinated? (Include coordination with other services you are now recommendation):  All required care/services in place and well-coordinated   Suggested Interventions and Community Resources                  Future Appointments   Date Time Provider Candace Velez   6/29/2023  1:30 PM Bard Maty MD [facility] BS AMB   7/5/2023  2:00 PM Placido Barry MD [facility] BS AMB

## 2023-06-23 ENCOUNTER — CARE COORDINATION (OUTPATIENT)
Facility: CLINIC | Age: 68
End: 2023-06-23

## 2023-06-23 ENCOUNTER — TELEPHONE (OUTPATIENT)
Facility: CLINIC | Age: 68
End: 2023-06-23

## 2023-06-23 RX ORDER — PREDNISONE 5 MG/1
5 TABLET ORAL DAILY
Qty: 90 TABLET | Refills: 3 | Status: SHIPPED | OUTPATIENT
Start: 2023-06-23

## 2023-06-23 ASSESSMENT — PATIENT HEALTH QUESTIONNAIRE - PHQ9
SUM OF ALL RESPONSES TO PHQ QUESTIONS 1-9: 0

## 2023-06-23 NOTE — CARE COORDINATION
Ambulatory Care Coordination Note  2023    Patient Current Location:  [place]    ACM contacted the patient and family by telephone. Verified name and  with family as identifiers. Provided introduction to self, and explanation of the ACM role. ACM: Gurinder Swift RN    Challenges to be reviewed by the provider   Additional needs identified to be addressed with provider: No  Patient is treating her diarrhea by not eating - concern that she will get dehydrated. PCP appointment  schedule for                Method of communication with provider: phone. Spoke with patient - Patient states doing well at present    Life Vest removed - AICD placed on Wednesday  5-3-23 in [place]. Surgical site showing no s/s of complications. States that AICD has not fired since implantation. Any changes in med therapy noted in med list - no complications or side effects  . Daughter understands medication therapy and adjustments needed prior to AICD implant  No other notable change in Patient health status. No ER visit or IP admission since last encounter. Follow up appointments listed below and questions from Patient / Care Giver answered. Patient has ACM contact information. Respiratory and cardiac status shows no issues at present  HTN Teaching        Always take medication as prescribed . Do not skip or stop medication unless specifically instructed to by MD or PCP. If you forget to take a dose, take it as soon as you remember. However, if it is almost time for your next dose, skip the missed dose and go back to your original schedule. Discussed symptoms of HTN - low sodium diet    Offered patient enrollment in the Remote Patient Monitoring (RPM) program for in-home monitoring: NA.     Ambulatory Care Coordination Assessment    Care Coordination Protocol  Referral from Primary Care Provider: No  Week 1 - Initial Assessment     Do you have all of your prescriptions and are they filled?: Yes  Barriers to

## 2023-06-23 NOTE — TELEPHONE ENCOUNTER
Patient called and needs a medication refill on predniSONE (DELTASONE) 5 MG tablet. Please advise.      Patient's pharmacy is [facility] [address], [place], [address] [place] [address]

## 2023-06-24 ENCOUNTER — HOSPITAL ENCOUNTER (EMERGENCY)
Age: 68
Discharge: HOME | End: 2023-06-24
Attending: EMERGENCY MEDICINE
Payer: MEDICARE

## 2023-06-24 VITALS
WEIGHT: 138 LBS | OXYGEN SATURATION: 99 % | SYSTOLIC BLOOD PRESSURE: 179 MMHG | RESPIRATION RATE: 14 BRPM | HEART RATE: 85 BPM | TEMPERATURE: 98.9 F | DIASTOLIC BLOOD PRESSURE: 89 MMHG | HEIGHT: 61 IN | BODY MASS INDEX: 26.06 KG/M2

## 2023-06-24 DIAGNOSIS — B02.9 HERPES ZOSTER WITHOUT COMPLICATION: Primary | ICD-10-CM

## 2023-06-24 PROCEDURE — 99283 EMERGENCY DEPT VISIT LOW MDM: CPT

## 2023-06-24 RX ORDER — HYDROCODONE BITARTRATE AND ACETAMINOPHEN 7.5; 325 MG/1; MG/1
1 TABLET ORAL EVERY 6 HOURS PRN
Qty: 7 TABLET | Refills: 0 | Status: SHIPPED | OUTPATIENT
Start: 2023-06-24 | End: 2023-06-27

## 2023-06-24 RX ORDER — ACYCLOVIR 50 MG/G
OINTMENT TOPICAL
Qty: 15 G | Refills: 0 | Status: SHIPPED | OUTPATIENT
Start: 2023-06-24 | End: 2023-06-26

## 2023-06-24 NOTE — ED TRIAGE NOTES
States that symptoms started about a week ago. She believed that she had been feeling like she had spider bites along her left side and under left breast. It appears to be a shingles infection.

## 2023-06-24 NOTE — ED PROVIDER NOTES
[facility] EMERGENCY DEPT  EMERGENCY DEPARTMENT ENCOUNTER      Pt Name: Isaiah Chavze  MRN: [id number]  [place] 1955  Date of evaluation: 6/24/2023  Provider: Jak Fisher MD    [address] [place] [address]       Chief Complaint   Patient presents with    Herpes Zoster         HISTORY OF PRESENT ILLNESS   (Location/Symptom, Timing/Onset, Context/Setting, Quality, Duration, Modifying Factors, Severity)  Note limiting factors. Isaiah Chavez is a 74 y.o. female with past medical history significant for end-stage renal disease who presents to the emergency department for delayed evaluation of skin rash for the last 1 week. Gradual onset and slowly improving without intervention. No alleviating factors attempted. No clear aggravating factors. No change to the character. The history is provided by the patient, a relative and medical records. Nursing Notes were reviewed. REVIEW OF SYSTEMS    (2-9 systems for level 4, 10 or more for level 5)     Review of Systems   All other systems reviewed and are negative. Except as noted above the remainder of the review of systems was reviewed and negative.        PAST MEDICAL HISTORY     Past Medical History:   Diagnosis Date    JULIET (acute kidney injury) ([facility] [address] 05/09/2019    Anxiety     Arrhythmia     Arthritis     Asthma     Asthma     Burning with urination     frequent uti    Calculus of gallbladder with acute cholecystitis without obstruction 10/09/2020    Cholecystitis 01/12/2019    Chronic kidney disease     dialysis    CMV (cytomegalovirus) antibody positive     Colitis 09/10/2022    COPD (chronic obstructive pulmonary disease) ([facility]     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    Dialysis patient [facility])     M/W/F    Diverticular disease of colon 08/21/2013    Dyspepsia and other specified disorders of function of stomach     stomach ulcer    Elevated troponin 10/21/2019    Encounter for cholecystectomy 05/06/2021 7/2020    Essential hypertension     GERD (gastroesophageal reflux

## 2023-06-24 NOTE — DISCHARGE INSTRUCTIONS
Use calamine lotion to dry the area as well as Epsom salt bath. Apply the acyclovir cream as directed for the next 5 days.

## 2023-06-26 ENCOUNTER — TELEPHONE (OUTPATIENT)
Facility: CLINIC | Age: 68
End: 2023-06-26

## 2023-06-26 DIAGNOSIS — D84.9 IMMUNOCOMPROMISED (HCC): ICD-10-CM

## 2023-06-26 DIAGNOSIS — B37.0 ORAL THRUSH: Primary | ICD-10-CM

## 2023-06-26 DIAGNOSIS — B02.9 HERPES ZOSTER WITHOUT COMPLICATION: Primary | ICD-10-CM

## 2023-06-26 RX ORDER — VALACYCLOVIR HYDROCHLORIDE 1 G/1
1000 TABLET, FILM COATED ORAL 3 TIMES DAILY
Qty: 21 TABLET | Refills: 0 | Status: ON HOLD | OUTPATIENT
Start: 2023-06-26 | End: 2023-07-03

## 2023-06-26 RX ORDER — FLUCONAZOLE 150 MG/1
150 TABLET ORAL DAILY
Qty: 7 TABLET | Refills: 0 | Status: ON HOLD | OUTPATIENT
Start: 2023-06-26 | End: 2023-07-03

## 2023-06-29 ENCOUNTER — APPOINTMENT (OUTPATIENT)
Age: 68
End: 2023-06-29
Attending: INTERNAL MEDICINE
Payer: MEDICARE

## 2023-06-29 ENCOUNTER — APPOINTMENT (OUTPATIENT)
Age: 68
End: 2023-06-29
Payer: MEDICARE

## 2023-06-29 ENCOUNTER — HOSPITAL ENCOUNTER (OUTPATIENT)
Age: 68
Setting detail: OBSERVATION
Discharge: ANOTHER HOSPITAL | End: 2023-06-29
Attending: EMERGENCY MEDICINE | Admitting: INTERNAL MEDICINE
Payer: MEDICARE

## 2023-06-29 VITALS
WEIGHT: 138 LBS | HEART RATE: 85 BPM | TEMPERATURE: 99.4 F | OXYGEN SATURATION: 100 % | DIASTOLIC BLOOD PRESSURE: 56 MMHG | HEIGHT: 61 IN | RESPIRATION RATE: 18 BRPM | SYSTOLIC BLOOD PRESSURE: 134 MMHG | BODY MASS INDEX: 26.06 KG/M2

## 2023-06-29 DIAGNOSIS — N18.6 END STAGE RENAL DISEASE ON DIALYSIS (HCC): ICD-10-CM

## 2023-06-29 DIAGNOSIS — G45.9 TIA (TRANSIENT ISCHEMIC ATTACK): ICD-10-CM

## 2023-06-29 DIAGNOSIS — R42 DIZZINESS: Primary | ICD-10-CM

## 2023-06-29 DIAGNOSIS — Z99.2 END STAGE RENAL DISEASE ON DIALYSIS (HCC): ICD-10-CM

## 2023-06-29 LAB
ANION GAP SERPL CALC-SCNC: 7 MMOL/L (ref 3–18)
APTT PPP: 28.6 SEC (ref 23–36.4)
BASOPHILS # BLD: 0.1 K/UL (ref 0–0.1)
BASOPHILS NFR BLD: 1 % (ref 0–2)
BUN SERPL-MCNC: 49 MG/DL (ref 7–18)
BUN/CREAT SERPL: 6 (ref 12–20)
CA-I BLD-MCNC: 8.2 MG/DL (ref 8.5–10.1)
CHLORIDE SERPL-SCNC: 107 MMOL/L (ref 100–111)
CO2 SERPL-SCNC: 26 MMOL/L (ref 21–32)
CREAT SERPL-MCNC: 7.99 MG/DL (ref 0.6–1.3)
DIFFERENTIAL METHOD BLD: ABNORMAL
EKG ATRIAL RATE: 73 BPM
EKG DIAGNOSIS: NORMAL
EKG P AXIS: 51 DEGREES
EKG P-R INTERVAL: 162 MS
EKG Q-T INTERVAL: 444 MS
EKG QRS DURATION: 82 MS
EKG QTC CALCULATION (BAZETT): 489 MS
EKG R AXIS: 38 DEGREES
EKG T AXIS: 64 DEGREES
EKG VENTRICULAR RATE: 73 BPM
EOSINOPHIL # BLD: 0.3 K/UL (ref 0–0.4)
EOSINOPHIL NFR BLD: 3 % (ref 0–5)
ERYTHROCYTE [DISTWIDTH] IN BLOOD BY AUTOMATED COUNT: 15.1 % (ref 11.6–14.5)
GLUCOSE BLD STRIP.AUTO-MCNC: 124 MG/DL (ref 70–110)
GLUCOSE SERPL-MCNC: 139 MG/DL (ref 74–99)
HCT VFR BLD AUTO: 33.8 % (ref 35–45)
HGB BLD-MCNC: 10.7 G/DL (ref 12–16)
IMM GRANULOCYTES # BLD AUTO: 0 K/UL (ref 0–0.04)
IMM GRANULOCYTES NFR BLD AUTO: 0 % (ref 0–0.5)
INR PPP: 1.2 (ref 0.8–1.2)
LYMPHOCYTES # BLD: 2.1 K/UL (ref 0.9–3.6)
LYMPHOCYTES NFR BLD: 26 % (ref 21–52)
MAGNESIUM SERPL-MCNC: 2.1 MG/DL (ref 1.6–2.6)
MCH RBC QN AUTO: 31.2 PG (ref 24–34)
MCHC RBC AUTO-ENTMCNC: 31.7 G/DL (ref 31–37)
MCV RBC AUTO: 98.5 FL (ref 78–100)
MONOCYTES # BLD: 0.5 K/UL (ref 0.05–1.2)
MONOCYTES NFR BLD: 6 % (ref 3–10)
NEUTS SEG # BLD: 5.2 K/UL (ref 1.8–8)
NEUTS SEG NFR BLD: 64 % (ref 40–73)
NRBC # BLD: 0 K/UL (ref 0–0.01)
NRBC BLD-RTO: 0 PER 100 WBC
PERFORMED BY:: ABNORMAL
PLATELET # BLD AUTO: 189 K/UL (ref 135–420)
PMV BLD AUTO: 9.3 FL (ref 9.2–11.8)
POTASSIUM SERPL-SCNC: 4.2 MMOL/L (ref 3.5–5.5)
PROTHROMBIN TIME: 15.3 SEC (ref 11.5–15.2)
RBC # BLD AUTO: 3.43 M/UL (ref 4.2–5.3)
SODIUM SERPL-SCNC: 140 MMOL/L (ref 136–145)
THERAPEUTIC RANGE: NORMAL SEC (ref 82–109)
TROPONIN I SERPL HS-MCNC: 19 NG/L (ref 0–54)
TSH SERPL DL<=0.05 MIU/L-ACNC: 2.4 UIU/ML (ref 0.36–3.74)
WBC # BLD AUTO: 8.1 K/UL (ref 4.6–13.2)

## 2023-06-29 PROCEDURE — 92610 EVALUATE SWALLOWING FUNCTION: CPT

## 2023-06-29 PROCEDURE — 85610 PROTHROMBIN TIME: CPT

## 2023-06-29 PROCEDURE — 6370000000 HC RX 637 (ALT 250 FOR IP): Performed by: INTERNAL MEDICINE

## 2023-06-29 PROCEDURE — 94640 AIRWAY INHALATION TREATMENT: CPT

## 2023-06-29 PROCEDURE — 97161 PT EVAL LOW COMPLEX 20 MIN: CPT

## 2023-06-29 PROCEDURE — 93005 ELECTROCARDIOGRAM TRACING: CPT | Performed by: NURSE PRACTITIONER

## 2023-06-29 PROCEDURE — 85730 THROMBOPLASTIN TIME PARTIAL: CPT

## 2023-06-29 PROCEDURE — 84484 ASSAY OF TROPONIN QUANT: CPT

## 2023-06-29 PROCEDURE — G0378 HOSPITAL OBSERVATION PER HR: HCPCS

## 2023-06-29 PROCEDURE — 2580000003 HC RX 258: Performed by: INTERNAL MEDICINE

## 2023-06-29 PROCEDURE — 83735 ASSAY OF MAGNESIUM: CPT

## 2023-06-29 PROCEDURE — 99285 EMERGENCY DEPT VISIT HI MDM: CPT

## 2023-06-29 PROCEDURE — 82962 GLUCOSE BLOOD TEST: CPT

## 2023-06-29 PROCEDURE — 80048 BASIC METABOLIC PNL TOTAL CA: CPT

## 2023-06-29 PROCEDURE — 87340 HEPATITIS B SURFACE AG IA: CPT

## 2023-06-29 PROCEDURE — 84443 ASSAY THYROID STIM HORMONE: CPT

## 2023-06-29 PROCEDURE — 94760 N-INVAS EAR/PLS OXIMETRY 1: CPT

## 2023-06-29 PROCEDURE — 85025 COMPLETE CBC W/AUTO DIFF WBC: CPT

## 2023-06-29 PROCEDURE — 90935 HEMODIALYSIS ONE EVALUATION: CPT

## 2023-06-29 PROCEDURE — 70450 CT HEAD/BRAIN W/O DYE: CPT

## 2023-06-29 RX ORDER — SODIUM CHLORIDE 9 MG/ML
INJECTION, SOLUTION INTRAVENOUS ONCE
Status: COMPLETED | OUTPATIENT
Start: 2023-06-29 | End: 2023-06-29

## 2023-06-29 RX ORDER — ALBUTEROL SULFATE 2.5 MG/3ML
2.5 SOLUTION RESPIRATORY (INHALATION) EVERY 4 HOURS PRN
Status: DISCONTINUED | OUTPATIENT
Start: 2023-06-29 | End: 2023-06-30 | Stop reason: HOSPADM

## 2023-06-29 RX ORDER — MONTELUKAST SODIUM 10 MG/1
10 TABLET ORAL DAILY
Status: DISCONTINUED | OUTPATIENT
Start: 2023-06-29 | End: 2023-06-30 | Stop reason: HOSPADM

## 2023-06-29 RX ORDER — CARVEDILOL 12.5 MG/1
25 TABLET ORAL 2 TIMES DAILY WITH MEALS
Status: DISCONTINUED | OUTPATIENT
Start: 2023-06-29 | End: 2023-06-30 | Stop reason: HOSPADM

## 2023-06-29 RX ORDER — CLOPIDOGREL BISULFATE 75 MG/1
75 TABLET ORAL DAILY
Status: DISCONTINUED | OUTPATIENT
Start: 2023-06-29 | End: 2023-06-30 | Stop reason: HOSPADM

## 2023-06-29 RX ORDER — NIFEDIPINE 30 MG/1
90 TABLET, EXTENDED RELEASE ORAL DAILY
Status: DISCONTINUED | OUTPATIENT
Start: 2023-06-29 | End: 2023-06-30 | Stop reason: HOSPADM

## 2023-06-29 RX ORDER — ALBUTEROL SULFATE 2.5 MG/3ML
2.5 SOLUTION RESPIRATORY (INHALATION) EVERY 4 HOURS PRN
Status: CANCELLED | OUTPATIENT
Start: 2023-06-29

## 2023-06-29 RX ORDER — DICYCLOMINE HYDROCHLORIDE 10 MG/1
10 CAPSULE ORAL 4 TIMES DAILY PRN
Status: CANCELLED | OUTPATIENT
Start: 2023-06-29

## 2023-06-29 RX ORDER — SEVELAMER CARBONATE 800 MG/1
800 TABLET, FILM COATED ORAL
Status: DISCONTINUED | OUTPATIENT
Start: 2023-06-29 | End: 2023-06-30 | Stop reason: HOSPADM

## 2023-06-29 RX ORDER — PANTOPRAZOLE SODIUM 40 MG/1
40 TABLET, DELAYED RELEASE ORAL
Status: DISCONTINUED | OUTPATIENT
Start: 2023-06-29 | End: 2023-06-30 | Stop reason: HOSPADM

## 2023-06-29 RX ORDER — LEVOTHYROXINE SODIUM 0.07 MG/1
75 TABLET ORAL
Status: DISCONTINUED | OUTPATIENT
Start: 2023-06-29 | End: 2023-06-30 | Stop reason: HOSPADM

## 2023-06-29 RX ORDER — ONDANSETRON 4 MG/1
4 TABLET, ORALLY DISINTEGRATING ORAL EVERY 8 HOURS PRN
Status: CANCELLED | OUTPATIENT
Start: 2023-06-29

## 2023-06-29 RX ORDER — CALCITRIOL 0.25 UG/1
0.5 CAPSULE, LIQUID FILLED ORAL
Status: DISCONTINUED | OUTPATIENT
Start: 2023-06-30 | End: 2023-06-30 | Stop reason: HOSPADM

## 2023-06-29 RX ORDER — ATORVASTATIN CALCIUM 10 MG/1
10 TABLET, FILM COATED ORAL DAILY
Status: CANCELLED | OUTPATIENT
Start: 2023-06-30

## 2023-06-29 RX ORDER — CALCITRIOL 0.25 UG/1
0.5 CAPSULE, LIQUID FILLED ORAL
Status: CANCELLED | OUTPATIENT
Start: 2023-06-30

## 2023-06-29 RX ORDER — CLOPIDOGREL BISULFATE 75 MG/1
75 TABLET ORAL DAILY
Status: CANCELLED | OUTPATIENT
Start: 2023-06-30

## 2023-06-29 RX ORDER — FOLIC ACID/VIT B COMPLEX AND C 0.8 MG
1 TABLET ORAL DAILY
Status: CANCELLED | OUTPATIENT
Start: 2023-06-30

## 2023-06-29 RX ORDER — LEVOTHYROXINE SODIUM 0.07 MG/1
75 TABLET ORAL
Status: CANCELLED | OUTPATIENT
Start: 2023-06-30

## 2023-06-29 RX ORDER — POLYETHYLENE GLYCOL 3350 17 G/17G
17 POWDER, FOR SOLUTION ORAL DAILY PRN
Status: DISCONTINUED | OUTPATIENT
Start: 2023-06-29 | End: 2023-06-30 | Stop reason: HOSPADM

## 2023-06-29 RX ORDER — CALCITRIOL 0.25 UG/1
0.5 CAPSULE, LIQUID FILLED ORAL
Status: DISCONTINUED | OUTPATIENT
Start: 2023-06-29 | End: 2023-06-29

## 2023-06-29 RX ORDER — CARVEDILOL 12.5 MG/1
25 TABLET ORAL 2 TIMES DAILY WITH MEALS
Status: CANCELLED | OUTPATIENT
Start: 2023-06-29

## 2023-06-29 RX ORDER — MECLIZINE HCL 12.5 MG/1
25 TABLET ORAL 3 TIMES DAILY PRN
Status: CANCELLED | OUTPATIENT
Start: 2023-06-29

## 2023-06-29 RX ORDER — MONTELUKAST SODIUM 10 MG/1
10 TABLET ORAL DAILY
Status: CANCELLED | OUTPATIENT
Start: 2023-06-30

## 2023-06-29 RX ORDER — MIRTAZAPINE 15 MG/1
15 TABLET, FILM COATED ORAL NIGHTLY
Status: CANCELLED | OUTPATIENT
Start: 2023-06-29

## 2023-06-29 RX ORDER — ONDANSETRON 4 MG/1
4 TABLET, ORALLY DISINTEGRATING ORAL EVERY 8 HOURS PRN
Status: DISCONTINUED | OUTPATIENT
Start: 2023-06-29 | End: 2023-06-30 | Stop reason: HOSPADM

## 2023-06-29 RX ORDER — FLUTICASONE PROPIONATE 50 MCG
1 SPRAY, SUSPENSION (ML) NASAL DAILY
Status: DISCONTINUED | OUTPATIENT
Start: 2023-06-29 | End: 2023-06-30 | Stop reason: HOSPADM

## 2023-06-29 RX ORDER — FLUTICASONE PROPIONATE 50 MCG
1 SPRAY, SUSPENSION (ML) NASAL DAILY
Status: CANCELLED | OUTPATIENT
Start: 2023-06-30

## 2023-06-29 RX ORDER — PREDNISONE 5 MG/1
5 TABLET ORAL DAILY
Status: CANCELLED | OUTPATIENT
Start: 2023-06-30

## 2023-06-29 RX ORDER — PANTOPRAZOLE SODIUM 40 MG/1
40 TABLET, DELAYED RELEASE ORAL
Status: CANCELLED | OUTPATIENT
Start: 2023-06-30

## 2023-06-29 RX ORDER — ROSUVASTATIN CALCIUM 10 MG/1
40 TABLET, COATED ORAL NIGHTLY
Status: DISCONTINUED | OUTPATIENT
Start: 2023-06-29 | End: 2023-06-29

## 2023-06-29 RX ORDER — ATORVASTATIN CALCIUM 10 MG/1
10 TABLET, FILM COATED ORAL DAILY
Status: DISCONTINUED | OUTPATIENT
Start: 2023-06-29 | End: 2023-06-30 | Stop reason: HOSPADM

## 2023-06-29 RX ORDER — ONDANSETRON 2 MG/ML
4 INJECTION INTRAMUSCULAR; INTRAVENOUS EVERY 6 HOURS PRN
Status: CANCELLED | OUTPATIENT
Start: 2023-06-29

## 2023-06-29 RX ORDER — NIFEDIPINE 30 MG/1
90 TABLET, EXTENDED RELEASE ORAL DAILY
Status: CANCELLED | OUTPATIENT
Start: 2023-06-30

## 2023-06-29 RX ORDER — FOLIC ACID/VIT B COMPLEX AND C 0.8 MG
1 TABLET ORAL DAILY
Status: DISCONTINUED | OUTPATIENT
Start: 2023-06-29 | End: 2023-06-30 | Stop reason: HOSPADM

## 2023-06-29 RX ORDER — DICYCLOMINE HYDROCHLORIDE 10 MG/1
10 CAPSULE ORAL 4 TIMES DAILY PRN
Status: DISCONTINUED | OUTPATIENT
Start: 2023-06-29 | End: 2023-06-30 | Stop reason: HOSPADM

## 2023-06-29 RX ORDER — ONDANSETRON 2 MG/ML
4 INJECTION INTRAMUSCULAR; INTRAVENOUS EVERY 6 HOURS PRN
Status: DISCONTINUED | OUTPATIENT
Start: 2023-06-29 | End: 2023-06-30 | Stop reason: HOSPADM

## 2023-06-29 RX ORDER — CINACALCET 30 MG/1
30 TABLET, FILM COATED ORAL DAILY
Status: CANCELLED | OUTPATIENT
Start: 2023-06-30

## 2023-06-29 RX ORDER — POLYETHYLENE GLYCOL 3350 17 G/17G
17 POWDER, FOR SOLUTION ORAL DAILY PRN
Status: CANCELLED | OUTPATIENT
Start: 2023-06-29

## 2023-06-29 RX ORDER — MECLIZINE HCL 12.5 MG/1
25 TABLET ORAL 3 TIMES DAILY PRN
Status: DISCONTINUED | OUTPATIENT
Start: 2023-06-29 | End: 2023-06-30 | Stop reason: HOSPADM

## 2023-06-29 RX ORDER — HYDRALAZINE HYDROCHLORIDE 10 MG/1
10 TABLET, FILM COATED ORAL 3 TIMES DAILY
Status: DISCONTINUED | OUTPATIENT
Start: 2023-06-29 | End: 2023-06-30 | Stop reason: HOSPADM

## 2023-06-29 RX ORDER — PREDNISONE 10 MG/1
5 TABLET ORAL DAILY
Status: DISCONTINUED | OUTPATIENT
Start: 2023-06-29 | End: 2023-06-30 | Stop reason: HOSPADM

## 2023-06-29 RX ORDER — SEVELAMER CARBONATE 800 MG/1
800 TABLET, FILM COATED ORAL
Status: CANCELLED | OUTPATIENT
Start: 2023-06-29

## 2023-06-29 RX ORDER — MIRTAZAPINE 15 MG/1
15 TABLET, FILM COATED ORAL NIGHTLY
Status: DISCONTINUED | OUTPATIENT
Start: 2023-06-29 | End: 2023-06-30 | Stop reason: HOSPADM

## 2023-06-29 RX ORDER — HYDRALAZINE HYDROCHLORIDE 10 MG/1
10 TABLET, FILM COATED ORAL 3 TIMES DAILY
Status: CANCELLED | OUTPATIENT
Start: 2023-06-29

## 2023-06-29 RX ORDER — CINACALCET 30 MG/1
30 TABLET, FILM COATED ORAL DAILY
Status: DISCONTINUED | OUTPATIENT
Start: 2023-06-29 | End: 2023-06-30 | Stop reason: HOSPADM

## 2023-06-29 RX ADMIN — CLOPIDOGREL BISULFATE 75 MG: 75 TABLET ORAL at 09:44

## 2023-06-29 RX ADMIN — CARVEDILOL 25 MG: 12.5 TABLET, FILM COATED ORAL at 17:05

## 2023-06-29 RX ADMIN — ATORVASTATIN CALCIUM 10 MG: 10 TABLET, FILM COATED ORAL at 08:43

## 2023-06-29 RX ADMIN — Medication 1 TABLET: at 08:52

## 2023-06-29 RX ADMIN — CARVEDILOL 25 MG: 12.5 TABLET, FILM COATED ORAL at 08:51

## 2023-06-29 RX ADMIN — HYDRALAZINE HYDROCHLORIDE 10 MG: 10 TABLET, FILM COATED ORAL at 20:56

## 2023-06-29 RX ADMIN — APIXABAN 2.5 MG: 2.5 TABLET, FILM COATED ORAL at 20:56

## 2023-06-29 RX ADMIN — MIRTAZAPINE 15 MG: 15 TABLET, FILM COATED ORAL at 20:57

## 2023-06-29 RX ADMIN — PANTOPRAZOLE SODIUM 40 MG: 40 TABLET, DELAYED RELEASE ORAL at 08:52

## 2023-06-29 RX ADMIN — MOMETASONE FUROATE AND FORMOTEROL FUMARATE DIHYDRATE 2 PUFF: 200; 5 AEROSOL RESPIRATORY (INHALATION) at 20:06

## 2023-06-29 RX ADMIN — SEVELAMER CARBONATE 800 MG: 800 TABLET, FILM COATED ORAL at 08:52

## 2023-06-29 RX ADMIN — PREDNISONE 5 MG: 10 TABLET ORAL at 08:43

## 2023-06-29 RX ADMIN — MONTELUKAST 10 MG: 10 TABLET, FILM COATED ORAL at 08:43

## 2023-06-29 RX ADMIN — LEVOTHYROXINE SODIUM 75 MCG: 0.07 TABLET ORAL at 08:52

## 2023-06-29 RX ADMIN — HYDRALAZINE HYDROCHLORIDE 10 MG: 10 TABLET, FILM COATED ORAL at 08:43

## 2023-06-29 RX ADMIN — SEVELAMER CARBONATE 800 MG: 800 TABLET, FILM COATED ORAL at 17:05

## 2023-06-29 RX ADMIN — SODIUM CHLORIDE 2000 ML: 9 INJECTION, SOLUTION INTRAVENOUS at 14:40

## 2023-06-29 RX ADMIN — CINACALCET HYDROCHLORIDE 30 MG: 30 TABLET, FILM COATED ORAL at 08:42

## 2023-06-29 RX ADMIN — NIFEDIPINE 90 MG: 30 TABLET, EXTENDED RELEASE ORAL at 08:42

## 2023-06-29 RX ADMIN — APIXABAN 2.5 MG: 2.5 TABLET, FILM COATED ORAL at 09:44

## 2023-06-29 RX ADMIN — CALCITRIOL 0.5 MCG: 0.25 CAPSULE ORAL at 08:43

## 2023-06-29 ASSESSMENT — PAIN - FUNCTIONAL ASSESSMENT: PAIN_FUNCTIONAL_ASSESSMENT: NONE - DENIES PAIN

## 2023-06-29 ASSESSMENT — PAIN SCALES - GENERAL
PAINLEVEL_OUTOF10: 0

## 2023-06-30 ENCOUNTER — APPOINTMENT (OUTPATIENT)
Facility: HOSPITAL | Age: 68
DRG: 069 | End: 2023-06-30
Attending: PSYCHIATRY & NEUROLOGY
Payer: MEDICARE

## 2023-06-30 ENCOUNTER — HOSPITAL ENCOUNTER (INPATIENT)
Facility: HOSPITAL | Age: 68
LOS: 3 days | Discharge: HOME | DRG: 069 | End: 2023-07-03
Attending: INTERNAL MEDICINE | Admitting: INTERNAL MEDICINE
Payer: MEDICARE

## 2023-06-30 DIAGNOSIS — B02.9 HERPES ZOSTER WITHOUT COMPLICATION: ICD-10-CM

## 2023-06-30 DIAGNOSIS — R11.2 NAUSEA AND VOMITING, UNSPECIFIED VOMITING TYPE: ICD-10-CM

## 2023-06-30 DIAGNOSIS — G45.9 TIA (TRANSIENT ISCHEMIC ATTACK): ICD-10-CM

## 2023-06-30 DIAGNOSIS — I61.9 CVA (CEREBROVASCULAR ACCIDENT DUE TO INTRACEREBRAL HEMORRHAGE) (HCC): ICD-10-CM

## 2023-06-30 DIAGNOSIS — R42 DIZZINESS: Primary | ICD-10-CM

## 2023-06-30 PROBLEM — I10 PRIMARY HYPERTENSION: Status: ACTIVE | Noted: 2023-06-30

## 2023-06-30 PROBLEM — R26.9 GAIT DIFFICULTY: Status: ACTIVE | Noted: 2023-06-30

## 2023-06-30 PROBLEM — R29.898 RIGHT ARM WEAKNESS: Status: ACTIVE | Noted: 2023-06-30

## 2023-06-30 LAB
BASOPHILS # BLD: 0.1 K/UL (ref 0–0.1)
BASOPHILS NFR BLD: 1 % (ref 0–1)
CHOLEST SERPL-MCNC: 109 MG/DL
DIFFERENTIAL METHOD BLD: ABNORMAL
ECHO AV MEAN GRADIENT: 6 MMHG
ECHO AV MEAN VELOCITY: 1.2 M/S
ECHO AV PEAK GRADIENT: 12 MMHG
ECHO AV PEAK VELOCITY: 1.7 M/S
ECHO AV VTI: 32.4 CM
ECHO BSA: 1.81 M2
ECHO EST RA PRESSURE: 10 MMHG
ECHO LA DIAMETER INDEX: 2.24 CM/M2
ECHO LA DIAMETER: 3.9 CM
ECHO LA VOL 2C: 106 ML (ref 22–52)
ECHO LA VOL 2C: 111 ML (ref 22–52)
ECHO LA VOL 4C: 110 ML (ref 22–52)
ECHO LA VOL 4C: 96 ML (ref 22–52)
ECHO LA VOLUME AREA LENGTH: 116 ML
ECHO LA VOLUME INDEX AREA LENGTH: 67 ML/M2 (ref 16–34)
ECHO LV EDV A4C: 102 ML
ECHO LV EDV INDEX A4C: 59 ML/M2
ECHO LV EJECTION FRACTION A4C: 66 %
ECHO LV ESV A4C: 35 ML
ECHO LV ESV INDEX A4C: 20 ML/M2
ECHO LV FRACTIONAL SHORTENING: 29 % (ref 28–44)
ECHO LV INTERNAL DIMENSION DIASTOLE INDEX: 2.36 CM/M2
ECHO LV INTERNAL DIMENSION DIASTOLIC: 4.1 CM (ref 3.9–5.3)
ECHO LV INTERNAL DIMENSION SYSTOLIC INDEX: 1.67 CM/M2
ECHO LV INTERNAL DIMENSION SYSTOLIC: 2.9 CM
ECHO LV IVSD: 1.9 CM (ref 0.6–0.9)
ECHO LV MASS 2D: 280.4 G (ref 67–162)
ECHO LV MASS INDEX 2D: 161.2 G/M2 (ref 43–95)
ECHO LV POSTERIOR WALL DIASTOLIC: 1.4 CM (ref 0.6–0.9)
ECHO LV RELATIVE WALL THICKNESS RATIO: 0.68
ECHO LVOT AREA: 3.5 CM2
ECHO LVOT DIAM: 2.1 CM
ECHO MV MAX VELOCITY: 1.1 M/S
ECHO MV MEAN GRADIENT: 2 MMHG
ECHO MV MEAN VELOCITY: 0.7 M/S
ECHO MV PEAK GRADIENT: 5 MMHG
ECHO MV VTI: 20.5 CM
ECHO RIGHT VENTRICULAR SYSTOLIC PRESSURE (RVSP): 39 MMHG
ECHO TV REGURGITANT MAX VELOCITY: 2.69 M/S
ECHO TV REGURGITANT PEAK GRADIENT: 29 MMHG
EOSINOPHIL # BLD: 0.4 K/UL (ref 0–0.4)
EOSINOPHIL NFR BLD: 4 % (ref 0–7)
ERYTHROCYTE [DISTWIDTH] IN BLOOD BY AUTOMATED COUNT: 15.2 % (ref 11.5–14.5)
EST. AVERAGE GLUCOSE BLD GHB EST-MCNC: ABNORMAL MG/DL
HBA1C MFR BLD: <3.8 % (ref 4–5.6)
HBV SURFACE AG SER QL: <0.1 INDEX
HBV SURFACE AG SER QL: NEGATIVE
HCT VFR BLD AUTO: 31.4 % (ref 35–47)
HDLC SERPL-MCNC: 44 MG/DL
HDLC SERPL: 2.5 (ref 0–5)
HGB BLD-MCNC: 10.3 G/DL (ref 11.5–16)
IMM GRANULOCYTES # BLD AUTO: 0 K/UL (ref 0–0.04)
IMM GRANULOCYTES NFR BLD AUTO: 0 % (ref 0–0.5)
LDLC SERPL CALC-MCNC: 40.2 MG/DL (ref 0–100)
LYMPHOCYTES # BLD: 2.4 K/UL (ref 0.8–3.5)
LYMPHOCYTES NFR BLD: 29 % (ref 12–49)
MCH RBC QN AUTO: 31.6 PG (ref 26–34)
MCHC RBC AUTO-ENTMCNC: 32.8 G/DL (ref 30–36.5)
MCV RBC AUTO: 96.3 FL (ref 80–99)
MONOCYTES # BLD: 0.7 K/UL (ref 0–1)
MONOCYTES NFR BLD: 9 % (ref 5–13)
NEUTS SEG # BLD: 4.9 K/UL (ref 1.8–8)
NEUTS SEG NFR BLD: 57 % (ref 32–75)
NRBC # BLD: 0 K/UL (ref 0–0.01)
NRBC BLD-RTO: 0 PER 100 WBC
PLATELET # BLD AUTO: 193 K/UL (ref 150–400)
PMV BLD AUTO: 9.6 FL (ref 8.9–12.9)
RBC # BLD AUTO: 3.26 M/UL (ref 3.8–5.2)
TRIGL SERPL-MCNC: 124 MG/DL
VLDLC SERPL CALC-MCNC: 24.8 MG/DL
WBC # BLD AUTO: 8.5 K/UL (ref 3.6–11)

## 2023-06-30 PROCEDURE — 93308 TTE F-UP OR LMTD: CPT

## 2023-06-30 PROCEDURE — 6370000000 HC RX 637 (ALT 250 FOR IP): Performed by: INTERNAL MEDICINE

## 2023-06-30 PROCEDURE — 2580000003 HC RX 258: Performed by: INTERNAL MEDICINE

## 2023-06-30 PROCEDURE — 94640 AIRWAY INHALATION TREATMENT: CPT

## 2023-06-30 PROCEDURE — 99223 1ST HOSP IP/OBS HIGH 75: CPT | Performed by: PSYCHIATRY & NEUROLOGY

## 2023-06-30 PROCEDURE — 36415 COLL VENOUS BLD VENIPUNCTURE: CPT

## 2023-06-30 PROCEDURE — G0378 HOSPITAL OBSERVATION PER HR: HCPCS

## 2023-06-30 PROCEDURE — G0379 DIRECT REFER HOSPITAL OBSERV: HCPCS

## 2023-06-30 PROCEDURE — 85025 COMPLETE CBC W/AUTO DIFF WBC: CPT

## 2023-06-30 PROCEDURE — 1100000000 HC RM PRIVATE

## 2023-06-30 PROCEDURE — 94760 N-INVAS EAR/PLS OXIMETRY 1: CPT

## 2023-06-30 PROCEDURE — 80061 LIPID PANEL: CPT

## 2023-06-30 PROCEDURE — 83036 HEMOGLOBIN GLYCOSYLATED A1C: CPT

## 2023-06-30 PROCEDURE — 6360000002 HC RX W HCPCS: Performed by: INTERNAL MEDICINE

## 2023-06-30 RX ORDER — ONDANSETRON 2 MG/ML
4 INJECTION INTRAMUSCULAR; INTRAVENOUS EVERY 6 HOURS PRN
Status: DISCONTINUED | OUTPATIENT
Start: 2023-06-30 | End: 2023-07-03 | Stop reason: HOSPADM

## 2023-06-30 RX ORDER — HYDRALAZINE HYDROCHLORIDE 10 MG/1
10 TABLET, FILM COATED ORAL 3 TIMES DAILY
Status: DISCONTINUED | OUTPATIENT
Start: 2023-06-30 | End: 2023-07-02

## 2023-06-30 RX ORDER — CARVEDILOL 12.5 MG/1
25 TABLET ORAL 2 TIMES DAILY WITH MEALS
Status: DISCONTINUED | OUTPATIENT
Start: 2023-06-30 | End: 2023-07-03 | Stop reason: HOSPADM

## 2023-06-30 RX ORDER — ALBUTEROL SULFATE 2.5 MG/3ML
2.5 SOLUTION RESPIRATORY (INHALATION) EVERY 6 HOURS
Status: DISCONTINUED | OUTPATIENT
Start: 2023-06-30 | End: 2023-06-30

## 2023-06-30 RX ORDER — LEVOTHYROXINE SODIUM 0.07 MG/1
75 TABLET ORAL
Status: DISCONTINUED | OUTPATIENT
Start: 2023-06-30 | End: 2023-06-30

## 2023-06-30 RX ORDER — CLOPIDOGREL BISULFATE 75 MG/1
75 TABLET ORAL DAILY
Status: DISCONTINUED | OUTPATIENT
Start: 2023-06-30 | End: 2023-07-03 | Stop reason: HOSPADM

## 2023-06-30 RX ORDER — NIFEDIPINE 90 MG/1
90 TABLET, FILM COATED, EXTENDED RELEASE ORAL DAILY
Status: DISCONTINUED | OUTPATIENT
Start: 2023-06-30 | End: 2023-06-30

## 2023-06-30 RX ORDER — POLYETHYLENE GLYCOL 3350 17 G/17G
17 POWDER, FOR SOLUTION ORAL DAILY PRN
Status: DISCONTINUED | OUTPATIENT
Start: 2023-06-30 | End: 2023-07-03 | Stop reason: HOSPADM

## 2023-06-30 RX ORDER — CALCITRIOL 0.25 UG/1
0.5 CAPSULE, LIQUID FILLED ORAL
Status: DISCONTINUED | OUTPATIENT
Start: 2023-06-30 | End: 2023-07-03 | Stop reason: HOSPADM

## 2023-06-30 RX ORDER — HYDRALAZINE HYDROCHLORIDE 10 MG/1
10 TABLET, FILM COATED ORAL 3 TIMES DAILY
Status: DISCONTINUED | OUTPATIENT
Start: 2023-06-30 | End: 2023-06-30

## 2023-06-30 RX ORDER — PREDNISONE 5 MG/1
5 TABLET ORAL DAILY
Status: DISCONTINUED | OUTPATIENT
Start: 2023-06-30 | End: 2023-06-30

## 2023-06-30 RX ORDER — FLUTICASONE PROPIONATE 50 MCG
1 SPRAY, SUSPENSION (ML) NASAL DAILY
Status: DISCONTINUED | OUTPATIENT
Start: 2023-06-30 | End: 2023-07-03 | Stop reason: HOSPADM

## 2023-06-30 RX ORDER — ONDANSETRON 2 MG/ML
4 INJECTION INTRAMUSCULAR; INTRAVENOUS EVERY 6 HOURS PRN
Status: DISCONTINUED | OUTPATIENT
Start: 2023-06-30 | End: 2023-06-30

## 2023-06-30 RX ORDER — MECLIZINE HCL 12.5 MG/1
25 TABLET ORAL 3 TIMES DAILY PRN
Status: DISCONTINUED | OUTPATIENT
Start: 2023-06-30 | End: 2023-07-03 | Stop reason: HOSPADM

## 2023-06-30 RX ORDER — CARVEDILOL 12.5 MG/1
25 TABLET ORAL 2 TIMES DAILY WITH MEALS
Status: DISCONTINUED | OUTPATIENT
Start: 2023-06-30 | End: 2023-06-30

## 2023-06-30 RX ORDER — MONTELUKAST SODIUM 10 MG/1
10 TABLET ORAL DAILY
Status: DISCONTINUED | OUTPATIENT
Start: 2023-06-30 | End: 2023-07-03 | Stop reason: HOSPADM

## 2023-06-30 RX ORDER — NIFEDIPINE 30 MG/1
90 TABLET, EXTENDED RELEASE ORAL DAILY
Status: DISCONTINUED | OUTPATIENT
Start: 2023-06-30 | End: 2023-07-03 | Stop reason: HOSPADM

## 2023-06-30 RX ORDER — ALBUTEROL SULFATE 2.5 MG/3ML
2.5 SOLUTION RESPIRATORY (INHALATION) EVERY 6 HOURS
Status: DISCONTINUED | OUTPATIENT
Start: 2023-06-30 | End: 2023-07-01

## 2023-06-30 RX ORDER — BUDESONIDE 0.5 MG/2ML
0.5 INHALANT ORAL 2 TIMES DAILY
Status: DISCONTINUED | OUTPATIENT
Start: 2023-06-30 | End: 2023-07-01

## 2023-06-30 RX ORDER — NEPHROCAP 1 MG
1 CAP ORAL DAILY
Status: DISCONTINUED | OUTPATIENT
Start: 2023-06-30 | End: 2023-07-03 | Stop reason: HOSPADM

## 2023-06-30 RX ORDER — ALBUTEROL SULFATE 90 UG/1
1 AEROSOL, METERED RESPIRATORY (INHALATION) EVERY 4 HOURS PRN
Status: DISCONTINUED | OUTPATIENT
Start: 2023-06-30 | End: 2023-07-03 | Stop reason: HOSPADM

## 2023-06-30 RX ORDER — LEVOTHYROXINE SODIUM 0.07 MG/1
75 TABLET ORAL
Status: DISCONTINUED | OUTPATIENT
Start: 2023-06-30 | End: 2023-07-03 | Stop reason: HOSPADM

## 2023-06-30 RX ORDER — BUDESONIDE 0.5 MG/2ML
0.5 INHALANT ORAL 2 TIMES DAILY
Status: DISCONTINUED | OUTPATIENT
Start: 2023-06-30 | End: 2023-06-30

## 2023-06-30 RX ORDER — ACETAMINOPHEN 325 MG/1
650 TABLET ORAL EVERY 6 HOURS PRN
Status: DISCONTINUED | OUTPATIENT
Start: 2023-06-30 | End: 2023-07-03 | Stop reason: HOSPADM

## 2023-06-30 RX ORDER — ALBUTEROL SULFATE 2.5 MG/3ML
2.5 SOLUTION RESPIRATORY (INHALATION) EVERY 4 HOURS PRN
Status: DISCONTINUED | OUTPATIENT
Start: 2023-06-30 | End: 2023-07-03 | Stop reason: HOSPADM

## 2023-06-30 RX ORDER — SEVELAMER CARBONATE 800 MG/1
800 TABLET, FILM COATED ORAL
Status: DISCONTINUED | OUTPATIENT
Start: 2023-06-30 | End: 2023-07-03 | Stop reason: HOSPADM

## 2023-06-30 RX ORDER — CINACALCET 30 MG/1
30 TABLET, FILM COATED ORAL DAILY
Status: DISCONTINUED | OUTPATIENT
Start: 2023-06-30 | End: 2023-06-30

## 2023-06-30 RX ORDER — CINACALCET 30 MG/1
30 TABLET, FILM COATED ORAL DAILY
Status: DISCONTINUED | OUTPATIENT
Start: 2023-06-30 | End: 2023-07-03 | Stop reason: HOSPADM

## 2023-06-30 RX ORDER — ATORVASTATIN CALCIUM 10 MG/1
10 TABLET, FILM COATED ORAL DAILY
Status: DISCONTINUED | OUTPATIENT
Start: 2023-06-30 | End: 2023-07-03 | Stop reason: HOSPADM

## 2023-06-30 RX ORDER — ONDANSETRON 4 MG/1
4 TABLET, ORALLY DISINTEGRATING ORAL EVERY 8 HOURS PRN
Status: DISCONTINUED | OUTPATIENT
Start: 2023-06-30 | End: 2023-07-03 | Stop reason: HOSPADM

## 2023-06-30 RX ORDER — ONDANSETRON 4 MG/1
4 TABLET, ORALLY DISINTEGRATING ORAL EVERY 8 HOURS PRN
Status: DISCONTINUED | OUTPATIENT
Start: 2023-06-30 | End: 2023-06-30

## 2023-06-30 RX ORDER — CALCITRIOL 0.25 UG/1
0.5 CAPSULE, LIQUID FILLED ORAL SEE ADMIN INSTRUCTIONS
Status: DISCONTINUED | OUTPATIENT
Start: 2023-06-30 | End: 2023-06-30

## 2023-06-30 RX ORDER — DICYCLOMINE HYDROCHLORIDE 10 MG/1
10 CAPSULE ORAL 3 TIMES DAILY PRN
Status: DISCONTINUED | OUTPATIENT
Start: 2023-06-30 | End: 2023-07-03 | Stop reason: HOSPADM

## 2023-06-30 RX ORDER — NIFEDIPINE 30 MG/1
90 TABLET, EXTENDED RELEASE ORAL DAILY
Status: DISCONTINUED | OUTPATIENT
Start: 2023-06-30 | End: 2023-06-30

## 2023-06-30 RX ORDER — SODIUM CHLORIDE, SODIUM LACTATE, POTASSIUM CHLORIDE, CALCIUM CHLORIDE 600; 310; 30; 20 MG/100ML; MG/100ML; MG/100ML; MG/100ML
INJECTION, SOLUTION INTRAVENOUS CONTINUOUS
Status: DISCONTINUED | OUTPATIENT
Start: 2023-06-30 | End: 2023-07-03 | Stop reason: HOSPADM

## 2023-06-30 RX ORDER — DICYCLOMINE HYDROCHLORIDE 10 MG/1
10 CAPSULE ORAL 4 TIMES DAILY PRN
Status: DISCONTINUED | OUTPATIENT
Start: 2023-06-30 | End: 2023-06-30

## 2023-06-30 RX ORDER — PANTOPRAZOLE SODIUM 40 MG/1
40 TABLET, DELAYED RELEASE ORAL
Status: DISCONTINUED | OUTPATIENT
Start: 2023-06-30 | End: 2023-07-03 | Stop reason: HOSPADM

## 2023-06-30 RX ORDER — MIRTAZAPINE 15 MG/1
15 TABLET, FILM COATED ORAL NIGHTLY
Status: DISCONTINUED | OUTPATIENT
Start: 2023-06-30 | End: 2023-07-03 | Stop reason: HOSPADM

## 2023-06-30 RX ORDER — SEVELAMER CARBONATE 800 MG/1
800 TABLET, FILM COATED ORAL
Status: DISCONTINUED | OUTPATIENT
Start: 2023-06-30 | End: 2023-06-30

## 2023-06-30 RX ADMIN — SODIUM CHLORIDE, POTASSIUM CHLORIDE, SODIUM LACTATE AND CALCIUM CHLORIDE: 600; 310; 30; 20 INJECTION, SOLUTION INTRAVENOUS at 01:46

## 2023-06-30 RX ADMIN — HYDRALAZINE HYDROCHLORIDE 10 MG: 10 TABLET, FILM COATED ORAL at 15:36

## 2023-06-30 RX ADMIN — CARVEDILOL 25 MG: 12.5 TABLET, FILM COATED ORAL at 10:37

## 2023-06-30 RX ADMIN — ALBUTEROL SULFATE 2.5 MG: 2.5 SOLUTION RESPIRATORY (INHALATION) at 02:16

## 2023-06-30 RX ADMIN — SODIUM CHLORIDE, POTASSIUM CHLORIDE, SODIUM LACTATE AND CALCIUM CHLORIDE: 600; 310; 30; 20 INJECTION, SOLUTION INTRAVENOUS at 15:37

## 2023-06-30 RX ADMIN — MONTELUKAST 10 MG: 10 TABLET, FILM COATED ORAL at 10:37

## 2023-06-30 RX ADMIN — FLUTICASONE PROPIONATE 1 SPRAY: 50 SPRAY, METERED NASAL at 10:41

## 2023-06-30 RX ADMIN — NEPHROCAP 1 MG: 1 CAP ORAL at 10:39

## 2023-06-30 RX ADMIN — APIXABAN 2.5 MG: 2.5 TABLET, FILM COATED ORAL at 10:38

## 2023-06-30 RX ADMIN — ACETAMINOPHEN 650 MG: 325 TABLET ORAL at 20:01

## 2023-06-30 RX ADMIN — CLOPIDOGREL BISULFATE 75 MG: 75 TABLET ORAL at 10:39

## 2023-06-30 RX ADMIN — HYDRALAZINE HYDROCHLORIDE 10 MG: 10 TABLET, FILM COATED ORAL at 10:37

## 2023-06-30 RX ADMIN — PANTOPRAZOLE SODIUM 40 MG: 40 TABLET, DELAYED RELEASE ORAL at 06:27

## 2023-06-30 RX ADMIN — APIXABAN 2.5 MG: 2.5 TABLET, FILM COATED ORAL at 22:04

## 2023-06-30 RX ADMIN — LEVOTHYROXINE SODIUM 75 MCG: 0.07 TABLET ORAL at 06:27

## 2023-06-30 RX ADMIN — NIFEDIPINE 90 MG: 30 TABLET, EXTENDED RELEASE ORAL at 10:38

## 2023-06-30 RX ADMIN — MIRTAZAPINE 15 MG: 15 TABLET, FILM COATED ORAL at 22:04

## 2023-06-30 RX ADMIN — SEVELAMER CARBONATE 800 MG: 800 TABLET, FILM COATED ORAL at 17:36

## 2023-06-30 RX ADMIN — CALCITRIOL CAPSULES 0.25 MCG 0.5 MCG: 0.25 CAPSULE ORAL at 10:41

## 2023-06-30 RX ADMIN — ATORVASTATIN CALCIUM 10 MG: 10 TABLET, FILM COATED ORAL at 10:37

## 2023-06-30 RX ADMIN — CINACALCET 30 MG: 30 TABLET, FILM COATED ORAL at 10:38

## 2023-06-30 RX ADMIN — CARVEDILOL 25 MG: 12.5 TABLET, FILM COATED ORAL at 17:36

## 2023-06-30 RX ADMIN — IPRATROPIUM BROMIDE 0.5 MG: 0.5 SOLUTION RESPIRATORY (INHALATION) at 14:47

## 2023-06-30 RX ADMIN — ALBUTEROL SULFATE 2.5 MG: 2.5 SOLUTION RESPIRATORY (INHALATION) at 14:47

## 2023-06-30 RX ADMIN — HYDRALAZINE HYDROCHLORIDE 10 MG: 10 TABLET, FILM COATED ORAL at 22:04

## 2023-06-30 ASSESSMENT — ENCOUNTER SYMPTOMS
WHEEZING: 0
SHORTNESS OF BREATH: 0
STRIDOR: 0
GASTROINTESTINAL NEGATIVE: 1

## 2023-06-30 ASSESSMENT — PAIN SCALES - GENERAL
PAINLEVEL_OUTOF10: 0
PAINLEVEL_OUTOF10: 3

## 2023-07-01 PROBLEM — B02.29 POST HERPETIC NEURALGIA: Status: ACTIVE | Noted: 2023-07-01

## 2023-07-01 LAB
ALBUMIN SERPL-MCNC: 3.2 G/DL (ref 3.5–5)
ALBUMIN/GLOB SERPL: 0.9 (ref 1.1–2.2)
ALP SERPL-CCNC: 87 U/L (ref 45–117)
ALT SERPL-CCNC: 17 U/L (ref 12–78)
ANION GAP SERPL CALC-SCNC: 6 MMOL/L (ref 5–15)
AST SERPL-CCNC: 16 U/L (ref 15–37)
BASOPHILS # BLD: 0.1 K/UL (ref 0–0.1)
BASOPHILS NFR BLD: 1 % (ref 0–1)
BILIRUB SERPL-MCNC: 0.3 MG/DL (ref 0.2–1)
BUN SERPL-MCNC: 47 MG/DL (ref 6–20)
BUN/CREAT SERPL: 7 (ref 12–20)
CALCIUM SERPL-MCNC: 7.5 MG/DL (ref 8.5–10.1)
CHLORIDE SERPL-SCNC: 104 MMOL/L (ref 97–108)
CHOLEST SERPL-MCNC: 104 MG/DL
CO2 SERPL-SCNC: 27 MMOL/L (ref 21–32)
CREAT SERPL-MCNC: 7.14 MG/DL (ref 0.55–1.02)
DIFFERENTIAL METHOD BLD: ABNORMAL
EOSINOPHIL # BLD: 0.6 K/UL (ref 0–0.4)
EOSINOPHIL NFR BLD: 8 % (ref 0–7)
ERYTHROCYTE [DISTWIDTH] IN BLOOD BY AUTOMATED COUNT: 15.1 % (ref 11.5–14.5)
ERYTHROCYTE [SEDIMENTATION RATE] IN BLOOD: 32 MM/HR (ref 0–30)
EST. AVERAGE GLUCOSE BLD GHB EST-MCNC: ABNORMAL MG/DL
GLOBULIN SER CALC-MCNC: 3.4 G/DL (ref 2–4)
GLUCOSE SERPL-MCNC: 93 MG/DL (ref 65–100)
HBA1C MFR BLD: <3.8 % (ref 4–5.6)
HCT VFR BLD AUTO: 31.5 % (ref 35–47)
HDLC SERPL-MCNC: 34 MG/DL
HDLC SERPL: 3.1 (ref 0–5)
HGB BLD-MCNC: 10.1 G/DL (ref 11.5–16)
IMM GRANULOCYTES # BLD AUTO: 0 K/UL (ref 0–0.04)
IMM GRANULOCYTES NFR BLD AUTO: 0 % (ref 0–0.5)
LDLC SERPL CALC-MCNC: 31.2 MG/DL (ref 0–100)
LYMPHOCYTES # BLD: 2.3 K/UL (ref 0.8–3.5)
LYMPHOCYTES NFR BLD: 32 % (ref 12–49)
MAGNESIUM SERPL-MCNC: 2.2 MG/DL (ref 1.6–2.4)
MCH RBC QN AUTO: 30.9 PG (ref 26–34)
MCHC RBC AUTO-ENTMCNC: 32.1 G/DL (ref 30–36.5)
MCV RBC AUTO: 96.3 FL (ref 80–99)
MONOCYTES # BLD: 0.7 K/UL (ref 0–1)
MONOCYTES NFR BLD: 9 % (ref 5–13)
NEUTS SEG # BLD: 3.6 K/UL (ref 1.8–8)
NEUTS SEG NFR BLD: 50 % (ref 32–75)
NRBC # BLD: 0 K/UL (ref 0–0.01)
NRBC BLD-RTO: 0 PER 100 WBC
PLATELET # BLD AUTO: 183 K/UL (ref 150–400)
PMV BLD AUTO: 9.5 FL (ref 8.9–12.9)
POTASSIUM SERPL-SCNC: 3.9 MMOL/L (ref 3.5–5.1)
PROT SERPL-MCNC: 6.6 G/DL (ref 6.4–8.2)
RBC # BLD AUTO: 3.27 M/UL (ref 3.8–5.2)
SODIUM SERPL-SCNC: 137 MMOL/L (ref 136–145)
TRIGL SERPL-MCNC: 194 MG/DL
VLDLC SERPL CALC-MCNC: 38.8 MG/DL
WBC # BLD AUTO: 7.2 K/UL (ref 3.6–11)

## 2023-07-01 PROCEDURE — 1100000000 HC RM PRIVATE

## 2023-07-01 PROCEDURE — 85025 COMPLETE CBC W/AUTO DIFF WBC: CPT

## 2023-07-01 PROCEDURE — 36415 COLL VENOUS BLD VENIPUNCTURE: CPT

## 2023-07-01 PROCEDURE — 90935 HEMODIALYSIS ONE EVALUATION: CPT

## 2023-07-01 PROCEDURE — 83516 IMMUNOASSAY NONANTIBODY: CPT

## 2023-07-01 PROCEDURE — 6370000000 HC RX 637 (ALT 250 FOR IP): Performed by: INTERNAL MEDICINE

## 2023-07-01 PROCEDURE — 83735 ASSAY OF MAGNESIUM: CPT

## 2023-07-01 PROCEDURE — 83036 HEMOGLOBIN GLYCOSYLATED A1C: CPT

## 2023-07-01 PROCEDURE — 80053 COMPREHEN METABOLIC PANEL: CPT

## 2023-07-01 PROCEDURE — 85652 RBC SED RATE AUTOMATED: CPT

## 2023-07-01 PROCEDURE — 94760 N-INVAS EAR/PLS OXIMETRY 1: CPT

## 2023-07-01 PROCEDURE — 86225 DNA ANTIBODY NATIVE: CPT

## 2023-07-01 PROCEDURE — 86235 NUCLEAR ANTIGEN ANTIBODY: CPT

## 2023-07-01 PROCEDURE — 99232 SBSQ HOSP IP/OBS MODERATE 35: CPT | Performed by: PSYCHIATRY & NEUROLOGY

## 2023-07-01 PROCEDURE — 80061 LIPID PANEL: CPT

## 2023-07-01 PROCEDURE — 5A1D70Z PERFORMANCE OF URINARY FILTRATION, INTERMITTENT, LESS THAN 6 HOURS PER DAY: ICD-10-PCS | Performed by: INTERNAL MEDICINE

## 2023-07-01 RX ORDER — ALBUTEROL SULFATE 2.5 MG/3ML
2.5 SOLUTION RESPIRATORY (INHALATION) EVERY 6 HOURS
Status: DISCONTINUED | OUTPATIENT
Start: 2023-07-01 | End: 2023-07-01

## 2023-07-01 RX ORDER — BUDESONIDE 0.5 MG/2ML
0.5 INHALANT ORAL 2 TIMES DAILY PRN
Status: DISCONTINUED | OUTPATIENT
Start: 2023-07-01 | End: 2023-07-01

## 2023-07-01 RX ORDER — GABAPENTIN 100 MG/1
100 CAPSULE ORAL DAILY
Status: DISCONTINUED | OUTPATIENT
Start: 2023-07-01 | End: 2023-07-03 | Stop reason: HOSPADM

## 2023-07-01 RX ORDER — CAPSAICIN 0.75 MG/G
CREAM TOPICAL 3 TIMES DAILY
Status: DISCONTINUED | OUTPATIENT
Start: 2023-07-01 | End: 2023-07-03 | Stop reason: HOSPADM

## 2023-07-01 RX ORDER — ALBUTEROL SULFATE 2.5 MG/3ML
2.5 SOLUTION RESPIRATORY (INHALATION) EVERY 6 HOURS PRN
Status: DISCONTINUED | OUTPATIENT
Start: 2023-07-01 | End: 2023-07-03 | Stop reason: HOSPADM

## 2023-07-01 RX ORDER — BUDESONIDE 0.5 MG/2ML
0.5 INHALANT ORAL 2 TIMES DAILY PRN
Status: DISCONTINUED | OUTPATIENT
Start: 2023-07-01 | End: 2023-07-03 | Stop reason: HOSPADM

## 2023-07-01 RX ADMIN — NIFEDIPINE 90 MG: 30 TABLET, EXTENDED RELEASE ORAL at 17:45

## 2023-07-01 RX ADMIN — SEVELAMER CARBONATE 800 MG: 800 TABLET, FILM COATED ORAL at 09:50

## 2023-07-01 RX ADMIN — MONTELUKAST 10 MG: 10 TABLET, FILM COATED ORAL at 09:50

## 2023-07-01 RX ADMIN — LEVOTHYROXINE SODIUM 75 MCG: 0.07 TABLET ORAL at 06:46

## 2023-07-01 RX ADMIN — CLOPIDOGREL BISULFATE 75 MG: 75 TABLET ORAL at 09:50

## 2023-07-01 RX ADMIN — CAPSAICIN: 0.75 CREAM TOPICAL at 14:10

## 2023-07-01 RX ADMIN — ACETAMINOPHEN 650 MG: 325 TABLET ORAL at 03:15

## 2023-07-01 RX ADMIN — MIRTAZAPINE 15 MG: 15 TABLET, FILM COATED ORAL at 20:56

## 2023-07-01 RX ADMIN — CARVEDILOL 25 MG: 12.5 TABLET, FILM COATED ORAL at 17:45

## 2023-07-01 RX ADMIN — CAPSAICIN: 0.75 CREAM TOPICAL at 20:57

## 2023-07-01 RX ADMIN — HYDRALAZINE HYDROCHLORIDE 10 MG: 10 TABLET, FILM COATED ORAL at 20:56

## 2023-07-01 RX ADMIN — SEVELAMER CARBONATE 800 MG: 800 TABLET, FILM COATED ORAL at 17:46

## 2023-07-01 RX ADMIN — GABAPENTIN 100 MG: 100 CAPSULE ORAL at 11:28

## 2023-07-01 RX ADMIN — ATORVASTATIN CALCIUM 10 MG: 10 TABLET, FILM COATED ORAL at 09:50

## 2023-07-01 RX ADMIN — APIXABAN 2.5 MG: 2.5 TABLET, FILM COATED ORAL at 20:56

## 2023-07-01 RX ADMIN — ACETAMINOPHEN 650 MG: 325 TABLET ORAL at 09:57

## 2023-07-01 RX ADMIN — ACETAMINOPHEN 650 MG: 325 TABLET ORAL at 17:48

## 2023-07-01 RX ADMIN — PANTOPRAZOLE SODIUM 40 MG: 40 TABLET, DELAYED RELEASE ORAL at 06:47

## 2023-07-01 RX ADMIN — NEPHROCAP 1 MG: 1 CAP ORAL at 09:50

## 2023-07-01 RX ADMIN — FLUTICASONE PROPIONATE 1 SPRAY: 50 SPRAY, METERED NASAL at 09:53

## 2023-07-01 RX ADMIN — APIXABAN 2.5 MG: 2.5 TABLET, FILM COATED ORAL at 09:49

## 2023-07-01 RX ADMIN — CINACALCET 30 MG: 30 TABLET, FILM COATED ORAL at 09:50

## 2023-07-01 ASSESSMENT — PAIN SCALES - GENERAL
PAINLEVEL_OUTOF10: 4
PAINLEVEL_OUTOF10: 10
PAINLEVEL_OUTOF10: 0

## 2023-07-01 ASSESSMENT — PAIN DESCRIPTION - LOCATION: LOCATION: ABDOMEN

## 2023-07-01 ASSESSMENT — PAIN DESCRIPTION - ORIENTATION: ORIENTATION: LEFT

## 2023-07-01 ASSESSMENT — PAIN DESCRIPTION - DESCRIPTORS: DESCRIPTORS: ACHING;SHOOTING

## 2023-07-02 PROCEDURE — 99232 SBSQ HOSP IP/OBS MODERATE 35: CPT | Performed by: PSYCHIATRY & NEUROLOGY

## 2023-07-02 PROCEDURE — 94760 N-INVAS EAR/PLS OXIMETRY 1: CPT

## 2023-07-02 PROCEDURE — 6370000000 HC RX 637 (ALT 250 FOR IP): Performed by: INTERNAL MEDICINE

## 2023-07-02 PROCEDURE — 1100000000 HC RM PRIVATE

## 2023-07-02 RX ORDER — LORAZEPAM 2 MG/ML
0.5 INJECTION INTRAMUSCULAR
Status: COMPLETED | OUTPATIENT
Start: 2023-07-03 | End: 2023-07-03

## 2023-07-02 RX ADMIN — ACETAMINOPHEN 650 MG: 325 TABLET ORAL at 09:18

## 2023-07-02 RX ADMIN — APIXABAN 2.5 MG: 2.5 TABLET, FILM COATED ORAL at 20:47

## 2023-07-02 RX ADMIN — NEPHROCAP 1 MG: 1 CAP ORAL at 09:18

## 2023-07-02 RX ADMIN — GABAPENTIN 100 MG: 100 CAPSULE ORAL at 09:19

## 2023-07-02 RX ADMIN — PANTOPRAZOLE SODIUM 40 MG: 40 TABLET, DELAYED RELEASE ORAL at 06:02

## 2023-07-02 RX ADMIN — CALCITRIOL CAPSULES 0.25 MCG 0.5 MCG: 0.25 CAPSULE ORAL at 09:25

## 2023-07-02 RX ADMIN — CINACALCET 30 MG: 30 TABLET, FILM COATED ORAL at 09:19

## 2023-07-02 RX ADMIN — CAPSAICIN: 0.75 CREAM TOPICAL at 09:35

## 2023-07-02 RX ADMIN — LEVOTHYROXINE SODIUM 75 MCG: 0.07 TABLET ORAL at 06:02

## 2023-07-02 RX ADMIN — NIFEDIPINE 90 MG: 30 TABLET, EXTENDED RELEASE ORAL at 09:33

## 2023-07-02 RX ADMIN — APIXABAN 2.5 MG: 2.5 TABLET, FILM COATED ORAL at 09:18

## 2023-07-02 RX ADMIN — FLUTICASONE PROPIONATE 1 SPRAY: 50 SPRAY, METERED NASAL at 09:35

## 2023-07-02 RX ADMIN — CARVEDILOL 25 MG: 12.5 TABLET, FILM COATED ORAL at 09:33

## 2023-07-02 RX ADMIN — MIRTAZAPINE 15 MG: 15 TABLET, FILM COATED ORAL at 20:47

## 2023-07-02 RX ADMIN — CLOPIDOGREL BISULFATE 75 MG: 75 TABLET ORAL at 09:18

## 2023-07-02 RX ADMIN — HYDRALAZINE HYDROCHLORIDE 10 MG: 10 TABLET, FILM COATED ORAL at 09:33

## 2023-07-02 RX ADMIN — SEVELAMER CARBONATE 800 MG: 800 TABLET, FILM COATED ORAL at 12:09

## 2023-07-02 RX ADMIN — CARVEDILOL 25 MG: 12.5 TABLET, FILM COATED ORAL at 17:11

## 2023-07-02 RX ADMIN — SEVELAMER CARBONATE 800 MG: 800 TABLET, FILM COATED ORAL at 09:19

## 2023-07-02 RX ADMIN — ATORVASTATIN CALCIUM 10 MG: 10 TABLET, FILM COATED ORAL at 09:25

## 2023-07-02 RX ADMIN — MONTELUKAST 10 MG: 10 TABLET, FILM COATED ORAL at 09:19

## 2023-07-02 RX ADMIN — SEVELAMER CARBONATE 800 MG: 800 TABLET, FILM COATED ORAL at 17:11

## 2023-07-02 ASSESSMENT — PAIN SCALES - GENERAL: PAINLEVEL_OUTOF10: 0

## 2023-07-03 ENCOUNTER — APPOINTMENT (OUTPATIENT)
Facility: HOSPITAL | Age: 68
DRG: 069 | End: 2023-07-03
Attending: PSYCHIATRY & NEUROLOGY
Payer: MEDICARE

## 2023-07-03 ENCOUNTER — APPOINTMENT (OUTPATIENT)
Facility: HOSPITAL | Age: 68
DRG: 069 | End: 2023-07-03
Attending: INTERNAL MEDICINE
Payer: MEDICARE

## 2023-07-03 VITALS
TEMPERATURE: 100 F | SYSTOLIC BLOOD PRESSURE: 138 MMHG | DIASTOLIC BLOOD PRESSURE: 64 MMHG | OXYGEN SATURATION: 98 % | HEART RATE: 85 BPM | WEIGHT: 173.06 LBS | HEIGHT: 60 IN | BODY MASS INDEX: 33.98 KG/M2 | RESPIRATION RATE: 18 BRPM

## 2023-07-03 LAB
ECHO BSA: 1.81 M2
VAS LEFT CCA DIST EDV: 5.4 CM/S
VAS LEFT CCA DIST PSV: 58.5 CM/S
VAS LEFT CCA PROX EDV: 4.7 CM/S
VAS LEFT CCA PROX PSV: 96.7 CM/S
VAS LEFT ECA EDV: 0 CM/S
VAS LEFT ECA PSV: 37.6 CM/S
VAS LEFT ICA DIST EDV: 11.5 CM/S
VAS LEFT ICA DIST PSV: 77.9 CM/S
VAS LEFT ICA MID EDV: 10.6 CM/S
VAS LEFT ICA MID PSV: 54.6 CM/S
VAS LEFT ICA PROX EDV: 9.3 CM/S
VAS LEFT ICA PROX PSV: 45.5 CM/S
VAS LEFT ICA/CCA PSV: 1.33 NO UNITS
VAS LEFT SUBCLAVIAN PROX EDV: 49.9 CM/S
VAS LEFT SUBCLAVIAN PROX PSV: 144.6 CM/S
VAS LEFT VERTEBRAL EDV: 0 CM/S
VAS LEFT VERTEBRAL PSV: 38.7 CM/S
VAS RIGHT CCA DIST EDV: 9.3 CM/S
VAS RIGHT CCA DIST PSV: 58.5 CM/S
VAS RIGHT CCA PROX EDV: 8 CM/S
VAS RIGHT CCA PROX PSV: 59.8 CM/S
VAS RIGHT ECA EDV: 0 CM/S
VAS RIGHT ECA PSV: 35.2 CM/S
VAS RIGHT ICA DIST EDV: 13.7 CM/S
VAS RIGHT ICA DIST PSV: 88.8 CM/S
VAS RIGHT ICA MID EDV: 13.9 CM/S
VAS RIGHT ICA MID PSV: 66.6 CM/S
VAS RIGHT ICA PROX EDV: 6.8 CM/S
VAS RIGHT ICA PROX PSV: 40.9 CM/S
VAS RIGHT ICA/CCA PSV: 1.5 NO UNITS
VAS RIGHT SUBCLAVIAN PROX EDV: 0 CM/S
VAS RIGHT SUBCLAVIAN PROX PSV: 106.6 CM/S
VAS RIGHT VERTEBRAL EDV: 13.2 CM/S
VAS RIGHT VERTEBRAL PSV: 76.6 CM/S

## 2023-07-03 PROCEDURE — 70551 MRI BRAIN STEM W/O DYE: CPT

## 2023-07-03 PROCEDURE — 93880 EXTRACRANIAL BILAT STUDY: CPT

## 2023-07-03 PROCEDURE — 6370000000 HC RX 637 (ALT 250 FOR IP): Performed by: INTERNAL MEDICINE

## 2023-07-03 PROCEDURE — 70547 MR ANGIOGRAPHY NECK W/O DYE: CPT

## 2023-07-03 PROCEDURE — 94760 N-INVAS EAR/PLS OXIMETRY 1: CPT

## 2023-07-03 PROCEDURE — 93880 EXTRACRANIAL BILAT STUDY: CPT | Performed by: PSYCHIATRY & NEUROLOGY

## 2023-07-03 PROCEDURE — 6360000002 HC RX W HCPCS: Performed by: INTERNAL MEDICINE

## 2023-07-03 PROCEDURE — 99232 SBSQ HOSP IP/OBS MODERATE 35: CPT | Performed by: PSYCHIATRY & NEUROLOGY

## 2023-07-03 PROCEDURE — 70544 MR ANGIOGRAPHY HEAD W/O DYE: CPT

## 2023-07-03 RX ORDER — LORAZEPAM 2 MG/ML
0.5 INJECTION INTRAMUSCULAR ONCE
Status: COMPLETED | OUTPATIENT
Start: 2023-07-03 | End: 2023-07-03

## 2023-07-03 RX ORDER — GABAPENTIN 100 MG/1
100 CAPSULE ORAL DAILY PRN
Qty: 30 CAPSULE | Refills: 0 | Status: SHIPPED | OUTPATIENT
Start: 2023-07-03 | End: 2023-08-02

## 2023-07-03 RX ORDER — CAPSAICIN 0.75 MG/G
CREAM TOPICAL
Qty: 120 G | Refills: 0 | Status: SHIPPED | OUTPATIENT
Start: 2023-07-03 | End: 2023-08-02

## 2023-07-03 RX ADMIN — CINACALCET 30 MG: 30 TABLET, FILM COATED ORAL at 10:50

## 2023-07-03 RX ADMIN — CALCITRIOL CAPSULES 0.25 MCG 0.5 MCG: 0.25 CAPSULE ORAL at 14:13

## 2023-07-03 RX ADMIN — CLOPIDOGREL BISULFATE 75 MG: 75 TABLET ORAL at 10:50

## 2023-07-03 RX ADMIN — MONTELUKAST 10 MG: 10 TABLET, FILM COATED ORAL at 10:50

## 2023-07-03 RX ADMIN — PANTOPRAZOLE SODIUM 40 MG: 40 TABLET, DELAYED RELEASE ORAL at 06:40

## 2023-07-03 RX ADMIN — CARVEDILOL 25 MG: 12.5 TABLET, FILM COATED ORAL at 10:50

## 2023-07-03 RX ADMIN — LEVOTHYROXINE SODIUM 75 MCG: 0.07 TABLET ORAL at 06:40

## 2023-07-03 RX ADMIN — NEPHROCAP 1 MG: 1 CAP ORAL at 10:50

## 2023-07-03 RX ADMIN — APIXABAN 2.5 MG: 2.5 TABLET, FILM COATED ORAL at 10:49

## 2023-07-03 RX ADMIN — ACETAMINOPHEN 650 MG: 325 TABLET ORAL at 10:50

## 2023-07-03 RX ADMIN — FLUTICASONE PROPIONATE 1 SPRAY: 50 SPRAY, METERED NASAL at 14:13

## 2023-07-03 RX ADMIN — LORAZEPAM 0.5 MG: 2 INJECTION INTRAMUSCULAR; INTRAVENOUS at 09:19

## 2023-07-03 RX ADMIN — ATORVASTATIN CALCIUM 10 MG: 10 TABLET, FILM COATED ORAL at 10:50

## 2023-07-03 RX ADMIN — GABAPENTIN 100 MG: 100 CAPSULE ORAL at 10:52

## 2023-07-03 RX ADMIN — NIFEDIPINE 90 MG: 30 TABLET, EXTENDED RELEASE ORAL at 10:49

## 2023-07-03 RX ADMIN — LORAZEPAM 0.5 MG: 2 INJECTION INTRAMUSCULAR; INTRAVENOUS at 10:49

## 2023-07-03 RX ADMIN — SEVELAMER CARBONATE 800 MG: 800 TABLET, FILM COATED ORAL at 14:13

## 2023-07-03 ASSESSMENT — PAIN SCALES - GENERAL: PAINLEVEL_OUTOF10: 0

## 2023-07-03 NOTE — DISCHARGE INSTRUCTIONS
HOSPITALIST DISCHARGE INSTRUCTIONS    NAME: Carol Hardwick   :  1955   MRN:  [id number]     Date/Time:  7/3/2023 3:13 PM    ADMIT DATE: 2023   DISCHARGE DATE: 7/3/2023     Attending Physician: Shawna Holcomb MD    DISCHARGE DIAGNOSIS:  TIA  Shingles      Medications: Per above medication reconciliation. Pain Management: per above medications    Recommended diet: renal diet    Recommended activity: activity as tolerated        Information obtained by :  I understand that if any problems occur once I am at home I am to contact my physician. I understand and acknowledge receipt of the instructions indicated above.                                                                                                                                            Physician's or R.N.'s Signature                                                                  Date/Time                                                                                                                                              Patient or Repres

## 2023-07-03 NOTE — PROGRESS NOTES
End of Shift Note    Bedside shift change report given to Ammy Rdz RN (oncoming nurse) by Inder Seay RN (offgoing nurse).   Report included the following information Nurse Handoff Report      Shift worked:  Nights   Shift summary and any significant changes:     -MRI PENDING CANT BE DONE UNTIL Monday (POSSIBLY)    -CLAUSTROPHOBIA MEDICATION ON BOARD FOR PATIENT MRI    No significant events over night     Concerns for physician to address:  NONE   Zone phone for oncoming shift:   [phone]     Patient Information  Krystle Salas  79 y.o.  6/30/2023  1:30 AM by Kimberley Clemente MD. Krystle Salas was admitted from DIRECT ADMIT    Problem List  Patient Active Problem List    Diagnosis Date Noted    Polymorphic ventricular tachycardia ([address]) 03/06/2023    Hypomagnesemia 03/06/2023    Chronic kidney disease with end stage renal failure on dialysis [facility]) 03/02/2023    Hypoglycemia 02/14/2023    Post herpetic neuralgia 07/01/2023    CVA (cerebrovascular accident due to intracerebral hemorrhage) ([address]) 06/30/2023    Right arm weakness 06/30/2023    Gait difficulty 06/30/2023    Primary hypertension 06/30/2023    Dizziness 06/29/2023    TIA (transient ischemic attack) 06/29/2023    Chronic diastolic (congestive) heart failure ([address]) 05/31/2023    Acute embolism and thrombosis of right femoral vein ([address]) 05/31/2023    Cardiac arrest ([address]) 05/03/2023    Ventricular tachyarrhythmia ([address]) 03/09/2023    Hyperkalemia 11/21/2022    Volume overload 11/21/2022    ESRD needing dialysis ([address]) 11/21/2022    Severe protein-calorie malnutrition ([address]) 09/11/2022    Stenosis of left vertebral artery 04/12/2022    Chronic anticoagulation 05/06/2021    Atrial fibrillation ([address]) 05/06/2021    Stomatitis 03/02/2021    ESRD (end stage renal disease) on dialysis ([address]) 12/28/2020    History of DVT (deep vein thrombosis) 12/28/2020    Hypothyroidism 12/24/2020    Vertigo 12/24/2020    GERD (gastroesophageal reflux disease)     Diverticulitis of intestine

## 2023-07-03 NOTE — PROGRESS NOTES
DISCHARGE  A&OX4, IV REMOVED CANNULA INTACT, TELE REMOVED, DENIES PAIN, DAUGHTER GIVEN PRESCRIPTIONS, DISCHARGE PACKET FROM THE OTHER HOSPITAL GIVEN TO PATIENT'S DAUGHTER WITH CT OF HEAD ON DISK. REVIEWED DISCHARGE INSTRUCTIONS AND APPOINTMENTS WITH PATIENT AND FAMILY. VERBALIZED UNDERSTANDING, ALL QUESTIONS ANSWERED. PATIENT LEFT WITH ALL BELONGINGS. TRANSPORTED TO FRONT ENTRANCE IN [facility].

## 2023-07-03 NOTE — PROGRESS NOTES
Consult  REFERRED BY:  Amy Henning MD    CHIEF COMPLAINT: Severe herpetic pain across her left chest      Subjective:     Marty Rodriguez is a 79 y.o. right-handed -American female we are seeing as a new patient to me, at the request of the hospitalist, for evaluation of severe left-sided chest pain after she had an episode of herpes zoster and now has postherpetic neuralgia, and the hospitalist to start her on gabapentin 100 mg each day, to help with the pain, and the patient is asking for IV Dilaudid she does not want to give, and she is already on Remeron 15 mg at night. The dose of gabapentin is probably going to be too low, we will follow levels she should probably get up to 100 mg 3 times a day to help the pain, that is the normal dose for renal patients. She denies any weakness, dizziness, and says she has no other complaints other than the pain. Patient's Doppler study shows no significant stenosis, and her MRI scan is still being read, but on my review of the diffusion-weighted imaging scans there is no evidence of acute infarct that I can see, so the lesion they are seeing on the CAT scan that sent her up here was an old infarct, and patient can probably go home now that she is doing better on her gabapentin 100 mg a day for her postherpetic neuralgia and she has had no new stroke.     Past Medical History:   Diagnosis Date    JULIET (acute kidney injury) ([facility]) 05/09/2019    Anxiety     Arrhythmia     Arthritis     Asthma     Asthma     Burning with urination     frequent uti    Calculus of gallbladder with acute cholecystitis without obstruction 10/09/2020    Cholecystitis 01/12/2019    Chronic kidney disease     dialysis    CMV (cytomegalovirus) antibody positive     Colitis 09/10/2022    COPD (chronic obstructive pulmonary disease) ([facility])     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    Dialysis patient [facility])     M/W/F    Diverticular disease of colon 08/21/2013    Dyspepsia and other specified

## 2023-07-03 NOTE — PLAN OF CARE
Problem: Safety - Adult  Goal: Free from fall injury  7/3/2023 0848 by Christy Barraza, RN  Outcome: Progressing  7/3/2023 0001 by Lucien Pathak RN  Outcome: Progressing

## 2023-07-04 LAB
CENTROMERE B AB SER-ACNC: <0.2 AI (ref 0–0.9)
CHROMATIN AB SERPL-ACNC: <0.2 AI (ref 0–0.9)
DSDNA AB SER-ACNC: <1 IU/ML (ref 0–9)
ENA JO1 AB SER-ACNC: <0.2 AI (ref 0–0.9)
ENA RNP AB SER-ACNC: 0.3 AI (ref 0–0.9)
ENA SCL70 AB SER-ACNC: <0.2 AI (ref 0–0.9)
ENA SM AB SER-ACNC: <0.2 AI (ref 0–0.9)
ENA SM+RNP AB SER-ACNC: <0.2 AI (ref 0–0.9)
ENA SS-A AB SER-ACNC: <0.2 AI (ref 0–0.9)
ENA SS-B AB SER-ACNC: <0.2 AI (ref 0–0.9)
RIBOSOMAL P AB SER-ACNC: <0.2 AI (ref 0–0.9)
SEE BELOW:, 164879: NORMAL

## 2023-07-05 ENCOUNTER — CARE COORDINATION (OUTPATIENT)
Facility: CLINIC | Age: 68
End: 2023-07-05

## 2023-07-05 ENCOUNTER — OFFICE VISIT (OUTPATIENT)
Facility: CLINIC | Age: 68
End: 2023-07-05

## 2023-07-05 VITALS
HEART RATE: 71 BPM | DIASTOLIC BLOOD PRESSURE: 65 MMHG | WEIGHT: 146 LBS | TEMPERATURE: 97.9 F | RESPIRATION RATE: 18 BRPM | OXYGEN SATURATION: 98 % | BODY MASS INDEX: 28.66 KG/M2 | SYSTOLIC BLOOD PRESSURE: 114 MMHG | HEIGHT: 60 IN

## 2023-07-05 DIAGNOSIS — G45.9 TIA (TRANSIENT ISCHEMIC ATTACK): ICD-10-CM

## 2023-07-05 DIAGNOSIS — Z09 HOSPITAL DISCHARGE FOLLOW-UP: Primary | ICD-10-CM

## 2023-07-05 DIAGNOSIS — T88.7XXA MEDICATION SIDE EFFECT: ICD-10-CM

## 2023-07-05 PROBLEM — I82.411 ACUTE EMBOLISM AND THROMBOSIS OF RIGHT FEMORAL VEIN (HCC): Status: RESOLVED | Noted: 2023-05-31 | Resolved: 2023-07-05

## 2023-07-05 RX ORDER — FLUTICASONE FUROATE, UMECLIDINIUM BROMIDE AND VILANTEROL TRIFENATATE 100; 62.5; 25 UG/1; UG/1; UG/1
POWDER RESPIRATORY (INHALATION)
Qty: 1 EACH | Refills: 3 | Status: SHIPPED | OUTPATIENT
Start: 2023-07-05

## 2023-07-05 NOTE — PROGRESS NOTES
Arya Erica presents today for   Chief Complaint   Patient presents with    Follow-Up from Hospital     Pt was hospitalized at [facility] 06/30/23-07/03/23 pt reports having an reaction to the medication received at ER on 06/29/23, pt reports feeling dizzy and off balance. Is someone accompanying this pt?yes, daughter     Is the patient using any DME equipment during OV? no    Health Maintenance reviewed and discussed and ordered per Provider. Health Maintenance Due   Topic Date Due    Hepatitis C screen  Never done    DTaP/Tdap/Td vaccine (1 - Tdap) Never done    Shingles vaccine (1 of 2) Never done    COVID-19 Vaccine (4 - Booster for [employer] series) 05/18/2022    Annual Wellness Visit (AWV)  01/21/2023   . Coordination of Care:  1. \"Have you been to the ER, urgent care clinic since your last visit? Hospitalized since your last visit? \" Yes    2. \"Have you seen or consulted any other health care providers outside of the [address] [place] [address] since your last visit? \" No    3. For patients aged 43-73: Has the patient had a colonoscopy? Yes- No care gap present    If the patient is female:    4. For patients aged 43-66: Has the patient had a mammogram within the past 2 years? Yes- No care gap present    5. For patients aged 21-65: Has the patient had a pap smear?  N/A based on age/sex

## 2023-07-05 NOTE — CARE COORDINATION
[facility] Care Transitions Initial Follow Up Call    Call within 2 business days of discharge: Yes    Patient Current Location:  Home: [address] [place] [phone]    Care Transition Nurse contacted the patient by telephone to perform post hospital discharge assessment. Verified name and  with patient as identifiers. Provided introduction to self, and explanation of the Care Transition Nurse role. Patient: Usman Peters Patient : 1955   MRN: [id number]    Discharge Date: 7/3/23 RARS: Readmission Risk Score: 21.4      Last Discharge [address] [facility],[address] [facility]       Date Complaint Diagnosis Description Type Department Provider    23 Error  ED (Error) MRMED     23  Dizziness . .. Admission (Discharged) Trinity Heard MD            Was this an external facility discharge? No Discharge Facility: n/a    Challenges to be reviewed by the provider   Additional needs identified to be addressed with provider: No  none               Method of communication with provider: none. Patient reported that she is doing well. Pt. Denied new or worsening of symptoms. Pt.reported that her symptoms are better. Pt. States that she has an appt this afternoon and that she is getting ready for her appt. Pt. Is requesting for CTN to call some other time. No questions, concerns and/or needs verbalized by Pt. At this time. Pt. Kept the conversation short.      Follow Up  Future Appointments   Date Time Provider [address]   2023  2:00 PM Belle Calderón MD [facility] EDWIGE Maria RN

## 2023-07-06 ENCOUNTER — CARE COORDINATION (OUTPATIENT)
Facility: CLINIC | Age: 68
End: 2023-07-06

## 2023-07-06 NOTE — CARE COORDINATION
[facility] Care Transitions Initial Follow Up Call    Call within 2 business days of discharge: Yes    Patient Current Location:  Home: [address] [place] [phone]    Care Transition Nurse contacted the patient by telephone to perform post hospital discharge assessment. Verified name and  with patient as identifiers. Provided introduction to self, and explanation of the Care Transition Nurse role. Patient: Rita Marshall Patient : 1955   MRN: [id number]  Reason for Admission: Right arm weakness and dizziness  Discharge Date: 7/3/23 RARS: Readmission Risk Score: 21.4      Last Discharge [address] [facility],[address] [facility]       Date Complaint Diagnosis Description Type Department Provider    23 Error  ED (Error) MRMED     23  Dizziness . .. Admission (Discharged) Marisel Stevens MD            Was this an external facility discharge? No Discharge Facility: n/a    Challenges to be reviewed by the provider   Additional needs identified to be addressed with provider: No  none               Method of communication with provider: none. Patient reported that she is getting ready to go to Dialysis. Pt. Prefers for CTN to call back some other time. Pt. Kept the conversation short and concluded/ended the call. Follow Up  Future Appointments   Date Time Provider [address]   2023  2:30 PM Celina Rider MD [facility] AMB       Care Transition Nurse provided contact information. Plan for follow-up call in 3-5 days based on severity of symptoms and risk factors. Plan for next call:  follow up symptoms, assess for any needs.      Ramon Echevarria RN

## 2023-07-10 ENCOUNTER — HOSPITAL ENCOUNTER (INPATIENT)
Age: 68
LOS: 1 days | Discharge: HOME | DRG: 314 | End: 2023-07-12
Attending: FAMILY MEDICINE | Admitting: INTERNAL MEDICINE
Payer: MEDICARE

## 2023-07-10 ENCOUNTER — APPOINTMENT (OUTPATIENT)
Age: 68
DRG: 314 | End: 2023-07-10
Payer: MEDICARE

## 2023-07-10 DIAGNOSIS — R42 DIZZINESS: ICD-10-CM

## 2023-07-10 DIAGNOSIS — Z99.2 ESRD (END STAGE RENAL DISEASE) ON DIALYSIS (HCC): ICD-10-CM

## 2023-07-10 DIAGNOSIS — N30.00 ACUTE CYSTITIS WITHOUT HEMATURIA: ICD-10-CM

## 2023-07-10 DIAGNOSIS — I95.9 HYPOTENSION, UNSPECIFIED HYPOTENSION TYPE: Primary | ICD-10-CM

## 2023-07-10 DIAGNOSIS — N18.6 ESRD (END STAGE RENAL DISEASE) ON DIALYSIS (HCC): ICD-10-CM

## 2023-07-10 LAB
ALBUMIN SERPL-MCNC: 3.2 G/DL (ref 3.4–5)
ALBUMIN/GLOB SERPL: 0.9 (ref 0.8–1.7)
ALP SERPL-CCNC: 133 U/L (ref 45–117)
ALT SERPL-CCNC: 68 U/L (ref 13–56)
ANION GAP SERPL CALC-SCNC: 7 MMOL/L (ref 3–18)
AST SERPL W P-5'-P-CCNC: 70 U/L (ref 10–38)
BASOPHILS # BLD: 0.1 K/UL (ref 0–0.1)
BASOPHILS NFR BLD: 2 % (ref 0–2)
BILIRUB SERPL-MCNC: 0.3 MG/DL (ref 0.2–1)
BNP SERPL-MCNC: ABNORMAL PG/ML (ref 0–900)
BUN SERPL-MCNC: 67 MG/DL (ref 7–18)
BUN/CREAT SERPL: 8 (ref 12–20)
CA-I BLD-MCNC: 7.8 MG/DL (ref 8.5–10.1)
CHLORIDE SERPL-SCNC: 102 MMOL/L (ref 100–111)
CO2 SERPL-SCNC: 24 MMOL/L (ref 21–32)
CREAT SERPL-MCNC: 8.58 MG/DL (ref 0.6–1.3)
DIFFERENTIAL METHOD BLD: ABNORMAL
EOSINOPHIL # BLD: 0.5 K/UL (ref 0–0.4)
EOSINOPHIL NFR BLD: 6 % (ref 0–5)
ERYTHROCYTE [DISTWIDTH] IN BLOOD BY AUTOMATED COUNT: 15.3 % (ref 11.6–14.5)
GLOBULIN SER CALC-MCNC: 3.7 G/DL (ref 2–4)
GLUCOSE SERPL-MCNC: 145 MG/DL (ref 74–99)
HCT VFR BLD AUTO: 31.2 % (ref 35–45)
HGB BLD-MCNC: 9.7 G/DL (ref 12–16)
IMM GRANULOCYTES # BLD AUTO: 0 K/UL (ref 0–0.04)
IMM GRANULOCYTES NFR BLD AUTO: 1 % (ref 0–0.5)
LYMPHOCYTES # BLD: 2.2 K/UL (ref 0.9–3.6)
LYMPHOCYTES NFR BLD: 26 % (ref 21–52)
MAGNESIUM SERPL-MCNC: 2.3 MG/DL (ref 1.6–2.6)
MCH RBC QN AUTO: 31.3 PG (ref 24–34)
MCHC RBC AUTO-ENTMCNC: 31.1 G/DL (ref 31–37)
MCV RBC AUTO: 100.6 FL (ref 78–100)
MONOCYTES # BLD: 0.5 K/UL (ref 0.05–1.2)
MONOCYTES NFR BLD: 6 % (ref 3–10)
NEUTS SEG # BLD: 5.1 K/UL (ref 1.8–8)
NEUTS SEG NFR BLD: 59 % (ref 40–73)
NRBC # BLD: 0 K/UL (ref 0–0.01)
NRBC BLD-RTO: 0 PER 100 WBC
PLATELET # BLD AUTO: 136 K/UL (ref 135–420)
PMV BLD AUTO: 10.5 FL (ref 9.2–11.8)
POTASSIUM SERPL-SCNC: 5.4 MMOL/L (ref 3.5–5.5)
PROT SERPL-MCNC: 6.9 G/DL (ref 6.4–8.2)
RBC # BLD AUTO: 3.1 M/UL (ref 4.2–5.3)
SODIUM SERPL-SCNC: 133 MMOL/L (ref 136–145)
TROPONIN I SERPL HS-MCNC: 14 NG/L (ref 0–54)
WBC # BLD AUTO: 8.5 K/UL (ref 4.6–13.2)

## 2023-07-10 PROCEDURE — 84484 ASSAY OF TROPONIN QUANT: CPT

## 2023-07-10 PROCEDURE — 83735 ASSAY OF MAGNESIUM: CPT

## 2023-07-10 PROCEDURE — 87186 SC STD MICRODIL/AGAR DIL: CPT

## 2023-07-10 PROCEDURE — 83880 ASSAY OF NATRIURETIC PEPTIDE: CPT

## 2023-07-10 PROCEDURE — 87077 CULTURE AEROBIC IDENTIFY: CPT

## 2023-07-10 PROCEDURE — 80053 COMPREHEN METABOLIC PANEL: CPT

## 2023-07-10 PROCEDURE — 87086 URINE CULTURE/COLONY COUNT: CPT

## 2023-07-10 PROCEDURE — 2580000003 HC RX 258: Performed by: FAMILY MEDICINE

## 2023-07-10 PROCEDURE — 81001 URINALYSIS AUTO W/SCOPE: CPT

## 2023-07-10 PROCEDURE — 71045 X-RAY EXAM CHEST 1 VIEW: CPT

## 2023-07-10 PROCEDURE — 36415 COLL VENOUS BLD VENIPUNCTURE: CPT

## 2023-07-10 PROCEDURE — 85025 COMPLETE CBC W/AUTO DIFF WBC: CPT

## 2023-07-10 PROCEDURE — 99285 EMERGENCY DEPT VISIT HI MDM: CPT

## 2023-07-10 PROCEDURE — 93005 ELECTROCARDIOGRAM TRACING: CPT

## 2023-07-10 RX ORDER — 0.9 % SODIUM CHLORIDE 0.9 %
1000 INTRAVENOUS SOLUTION INTRAVENOUS ONCE
Status: COMPLETED | OUTPATIENT
Start: 2023-07-10 | End: 2023-07-11

## 2023-07-10 RX ADMIN — SODIUM CHLORIDE 1000 ML: 9 INJECTION, SOLUTION INTRAVENOUS at 23:07

## 2023-07-10 ASSESSMENT — PAIN - FUNCTIONAL ASSESSMENT: PAIN_FUNCTIONAL_ASSESSMENT: NONE - DENIES PAIN

## 2023-07-11 ENCOUNTER — APPOINTMENT (OUTPATIENT)
Age: 68
DRG: 314 | End: 2023-07-11
Payer: MEDICARE

## 2023-07-11 PROBLEM — Z99.2 ESRD (END STAGE RENAL DISEASE) ON DIALYSIS (HCC): Status: ACTIVE | Noted: 2020-12-28

## 2023-07-11 PROBLEM — N18.6 ESRD (END STAGE RENAL DISEASE) ON DIALYSIS (HCC): Status: ACTIVE | Noted: 2020-12-28

## 2023-07-11 PROBLEM — E83.42 HYPOMAGNESEMIA: Status: RESOLVED | Noted: 2023-03-06 | Resolved: 2023-07-11

## 2023-07-11 PROBLEM — I95.9 HYPOTENSION: Status: ACTIVE | Noted: 2023-07-11

## 2023-07-11 PROBLEM — E87.1 HYPONATREMIA: Status: ACTIVE | Noted: 2023-07-11

## 2023-07-11 LAB
ANION GAP SERPL CALC-SCNC: 5 MMOL/L (ref 3–18)
APPEARANCE UR: ABNORMAL
BACTERIA URNS QL MICRO: ABNORMAL /HPF
BASOPHILS # BLD: 0.1 K/UL (ref 0–0.1)
BASOPHILS NFR BLD: 1 % (ref 0–2)
BILIRUB UR QL: NEGATIVE
BUN SERPL-MCNC: 71 MG/DL (ref 7–18)
BUN/CREAT SERPL: 8 (ref 12–20)
CA-I BLD-MCNC: 7.5 MG/DL (ref 8.5–10.1)
CHLORIDE SERPL-SCNC: 102 MMOL/L (ref 100–111)
CO2 SERPL-SCNC: 25 MMOL/L (ref 21–32)
COLOR UR: YELLOW
CREAT SERPL-MCNC: 8.63 MG/DL (ref 0.6–1.3)
DIFFERENTIAL METHOD BLD: ABNORMAL
EKG ATRIAL RATE: 64 BPM
EKG DIAGNOSIS: NORMAL
EKG P AXIS: 9 DEGREES
EKG P-R INTERVAL: 212 MS
EKG Q-T INTERVAL: 422 MS
EKG QRS DURATION: 84 MS
EKG QTC CALCULATION (BAZETT): 435 MS
EKG R AXIS: 54 DEGREES
EKG T AXIS: 77 DEGREES
EKG VENTRICULAR RATE: 64 BPM
EOSINOPHIL # BLD: 0.3 K/UL (ref 0–0.4)
EOSINOPHIL NFR BLD: 4 % (ref 0–5)
EPITH CASTS URNS QL MICRO: ABNORMAL /LPF (ref 0–20)
ERYTHROCYTE [DISTWIDTH] IN BLOOD BY AUTOMATED COUNT: 15.3 % (ref 11.6–14.5)
GLUCOSE BLD STRIP.AUTO-MCNC: 130 MG/DL (ref 70–110)
GLUCOSE BLD STRIP.AUTO-MCNC: 152 MG/DL (ref 70–110)
GLUCOSE BLD STRIP.AUTO-MCNC: 94 MG/DL (ref 70–110)
GLUCOSE SERPL-MCNC: 113 MG/DL (ref 74–99)
GLUCOSE UR STRIP.AUTO-MCNC: NEGATIVE MG/DL
HCT VFR BLD AUTO: 31.2 % (ref 35–45)
HGB BLD-MCNC: 9.4 G/DL (ref 12–16)
HGB UR QL STRIP: NEGATIVE
IMM GRANULOCYTES # BLD AUTO: 0 K/UL (ref 0–0.04)
IMM GRANULOCYTES NFR BLD AUTO: 0 % (ref 0–0.5)
KETONES UR QL STRIP.AUTO: NEGATIVE MG/DL
LEUKOCYTE ESTERASE UR QL STRIP.AUTO: ABNORMAL
LYMPHOCYTES # BLD: 2.8 K/UL (ref 0.9–3.6)
LYMPHOCYTES NFR BLD: 31 % (ref 21–52)
MCH RBC QN AUTO: 30.1 PG (ref 24–34)
MCHC RBC AUTO-ENTMCNC: 30.1 G/DL (ref 31–37)
MCV RBC AUTO: 100 FL (ref 78–100)
MONOCYTES # BLD: 0.6 K/UL (ref 0.05–1.2)
MONOCYTES NFR BLD: 7 % (ref 3–10)
NEUTS SEG # BLD: 5 K/UL (ref 1.8–8)
NEUTS SEG NFR BLD: 57 % (ref 40–73)
NITRITE UR QL STRIP.AUTO: NEGATIVE
NRBC # BLD: 0 K/UL (ref 0–0.01)
NRBC BLD-RTO: 0 PER 100 WBC
PERFORMED BY:: ABNORMAL
PERFORMED BY:: ABNORMAL
PERFORMED BY:: NORMAL
PH UR STRIP: 8 (ref 5–8)
PLATELET # BLD AUTO: 129 K/UL (ref 135–420)
PMV BLD AUTO: 10.6 FL (ref 9.2–11.8)
POTASSIUM SERPL-SCNC: 6 MMOL/L (ref 3.5–5.5)
PROT UR STRIP-MCNC: 300 MG/DL
RBC # BLD AUTO: 3.12 M/UL (ref 4.2–5.3)
RBC #/AREA URNS HPF: ABNORMAL /HPF (ref 0–2)
SODIUM SERPL-SCNC: 132 MMOL/L (ref 136–145)
SP GR UR REFRACTOMETRY: 1.01 (ref 1–1.03)
UROBILINOGEN UR QL STRIP.AUTO: 0.2 EU/DL (ref 0.2–1)
WBC # BLD AUTO: 8.8 K/UL (ref 4.6–13.2)
WBC URNS QL MICRO: ABNORMAL /HPF (ref 0–4)

## 2023-07-11 PROCEDURE — 82962 GLUCOSE BLOOD TEST: CPT

## 2023-07-11 PROCEDURE — 2580000003 HC RX 258: Performed by: PHYSICIAN ASSISTANT

## 2023-07-11 PROCEDURE — 80048 BASIC METABOLIC PNL TOTAL CA: CPT

## 2023-07-11 PROCEDURE — 6360000002 HC RX W HCPCS: Performed by: FAMILY MEDICINE

## 2023-07-11 PROCEDURE — 1100000000 HC RM PRIVATE

## 2023-07-11 PROCEDURE — 5A1D70Z PERFORMANCE OF URINARY FILTRATION, INTERMITTENT, LESS THAN 6 HOURS PER DAY: ICD-10-PCS | Performed by: INTERNAL MEDICINE

## 2023-07-11 PROCEDURE — 94761 N-INVAS EAR/PLS OXIMETRY MLT: CPT

## 2023-07-11 PROCEDURE — 6370000000 HC RX 637 (ALT 250 FOR IP): Performed by: INTERNAL MEDICINE

## 2023-07-11 PROCEDURE — 6370000000 HC RX 637 (ALT 250 FOR IP): Performed by: PHYSICIAN ASSISTANT

## 2023-07-11 PROCEDURE — 2000000000 HC ICU R&B

## 2023-07-11 PROCEDURE — 90935 HEMODIALYSIS ONE EVALUATION: CPT

## 2023-07-11 PROCEDURE — 94664 DEMO&/EVAL PT USE INHALER: CPT

## 2023-07-11 PROCEDURE — 85025 COMPLETE CBC W/AUTO DIFF WBC: CPT

## 2023-07-11 PROCEDURE — 70450 CT HEAD/BRAIN W/O DYE: CPT

## 2023-07-11 PROCEDURE — 6360000002 HC RX W HCPCS: Performed by: INTERNAL MEDICINE

## 2023-07-11 PROCEDURE — 94640 AIRWAY INHALATION TREATMENT: CPT

## 2023-07-11 RX ORDER — HYDRALAZINE HYDROCHLORIDE 10 MG/1
10 TABLET, FILM COATED ORAL 3 TIMES DAILY
Status: DISCONTINUED | OUTPATIENT
Start: 2023-07-11 | End: 2023-07-12 | Stop reason: HOSPADM

## 2023-07-11 RX ORDER — POLYETHYLENE GLYCOL 3350 17 G/17G
17 POWDER, FOR SOLUTION ORAL DAILY PRN
Status: DISCONTINUED | OUTPATIENT
Start: 2023-07-11 | End: 2023-07-12 | Stop reason: HOSPADM

## 2023-07-11 RX ORDER — FLUTICASONE PROPIONATE 50 MCG
1 SPRAY, SUSPENSION (ML) NASAL DAILY
Status: DISCONTINUED | OUTPATIENT
Start: 2023-07-11 | End: 2023-07-12 | Stop reason: HOSPADM

## 2023-07-11 RX ORDER — FOLIC ACID/VIT B COMPLEX AND C 0.8 MG
1 TABLET ORAL DAILY
Status: DISCONTINUED | OUTPATIENT
Start: 2023-07-11 | End: 2023-07-12 | Stop reason: HOSPADM

## 2023-07-11 RX ORDER — FLUTICASONE PROPIONATE 110 UG/1
1 AEROSOL, METERED RESPIRATORY (INHALATION) 2 TIMES DAILY
Status: DISCONTINUED | OUTPATIENT
Start: 2023-07-11 | End: 2023-07-12 | Stop reason: HOSPADM

## 2023-07-11 RX ORDER — PROMETHAZINE HYDROCHLORIDE 25 MG/1
12.5 TABLET ORAL EVERY 6 HOURS PRN
Status: DISCONTINUED | OUTPATIENT
Start: 2023-07-11 | End: 2023-07-12 | Stop reason: HOSPADM

## 2023-07-11 RX ORDER — B,C/FOLIC/ZINC/SELENOMETH/D3/E 0.8-12.5MG
1 TABLET ORAL DAILY
Status: DISCONTINUED | OUTPATIENT
Start: 2023-07-11 | End: 2023-07-11 | Stop reason: CLARIF

## 2023-07-11 RX ORDER — MECLIZINE HCL 12.5 MG/1
25 TABLET ORAL 2 TIMES DAILY PRN
Status: DISCONTINUED | OUTPATIENT
Start: 2023-07-11 | End: 2023-07-12 | Stop reason: HOSPADM

## 2023-07-11 RX ORDER — CARVEDILOL 12.5 MG/1
25 TABLET ORAL 2 TIMES DAILY WITH MEALS
Status: DISCONTINUED | OUTPATIENT
Start: 2023-07-11 | End: 2023-07-12 | Stop reason: HOSPADM

## 2023-07-11 RX ORDER — LEVOFLOXACIN 5 MG/ML
250 INJECTION, SOLUTION INTRAVENOUS
Status: COMPLETED | OUTPATIENT
Start: 2023-07-11 | End: 2023-07-11

## 2023-07-11 RX ORDER — ONDANSETRON 4 MG/1
4 TABLET, ORALLY DISINTEGRATING ORAL EVERY 8 HOURS PRN
Status: DISCONTINUED | OUTPATIENT
Start: 2023-07-11 | End: 2023-07-12 | Stop reason: HOSPADM

## 2023-07-11 RX ORDER — MONTELUKAST SODIUM 10 MG/1
10 TABLET ORAL DAILY
Status: DISCONTINUED | OUTPATIENT
Start: 2023-07-11 | End: 2023-07-12 | Stop reason: HOSPADM

## 2023-07-11 RX ORDER — PREDNISONE 5 MG/1
5 TABLET ORAL DAILY
Status: DISCONTINUED | OUTPATIENT
Start: 2023-07-11 | End: 2023-07-12 | Stop reason: HOSPADM

## 2023-07-11 RX ORDER — SODIUM CHLORIDE 0.9 % (FLUSH) 0.9 %
5-40 SYRINGE (ML) INJECTION PRN
Status: DISCONTINUED | OUTPATIENT
Start: 2023-07-11 | End: 2023-07-12 | Stop reason: HOSPADM

## 2023-07-11 RX ORDER — ACETAMINOPHEN 650 MG/1
650 SUPPOSITORY RECTAL EVERY 6 HOURS PRN
Status: DISCONTINUED | OUTPATIENT
Start: 2023-07-11 | End: 2023-07-12 | Stop reason: HOSPADM

## 2023-07-11 RX ORDER — NIFEDIPINE 30 MG/1
90 TABLET, EXTENDED RELEASE ORAL DAILY
Status: DISCONTINUED | OUTPATIENT
Start: 2023-07-11 | End: 2023-07-12 | Stop reason: HOSPADM

## 2023-07-11 RX ORDER — MIRTAZAPINE 15 MG/1
15 TABLET, FILM COATED ORAL NIGHTLY
Status: DISCONTINUED | OUTPATIENT
Start: 2023-07-11 | End: 2023-07-12 | Stop reason: HOSPADM

## 2023-07-11 RX ORDER — ATORVASTATIN CALCIUM 10 MG/1
10 TABLET, FILM COATED ORAL DAILY
Status: DISCONTINUED | OUTPATIENT
Start: 2023-07-11 | End: 2023-07-12 | Stop reason: HOSPADM

## 2023-07-11 RX ORDER — SODIUM CHLORIDE 9 MG/ML
INJECTION, SOLUTION INTRAVENOUS PRN
Status: DISCONTINUED | OUTPATIENT
Start: 2023-07-11 | End: 2023-07-12 | Stop reason: HOSPADM

## 2023-07-11 RX ORDER — ALBUTEROL SULFATE 90 UG/1
1 AEROSOL, METERED RESPIRATORY (INHALATION) EVERY 4 HOURS PRN
Status: DISCONTINUED | OUTPATIENT
Start: 2023-07-11 | End: 2023-07-12 | Stop reason: HOSPADM

## 2023-07-11 RX ORDER — GABAPENTIN 100 MG/1
100 CAPSULE ORAL DAILY PRN
Status: DISCONTINUED | OUTPATIENT
Start: 2023-07-11 | End: 2023-07-12 | Stop reason: HOSPADM

## 2023-07-11 RX ORDER — SEVELAMER CARBONATE 800 MG/1
800 TABLET, FILM COATED ORAL
Status: DISCONTINUED | OUTPATIENT
Start: 2023-07-11 | End: 2023-07-12 | Stop reason: HOSPADM

## 2023-07-11 RX ORDER — DICYCLOMINE HYDROCHLORIDE 10 MG/1
10 CAPSULE ORAL 3 TIMES DAILY PRN
Status: DISCONTINUED | OUTPATIENT
Start: 2023-07-11 | End: 2023-07-12 | Stop reason: HOSPADM

## 2023-07-11 RX ORDER — 0.9 % SODIUM CHLORIDE 0.9 %
250 INTRAVENOUS SOLUTION INTRAVENOUS ONCE
Status: COMPLETED | OUTPATIENT
Start: 2023-07-11 | End: 2023-07-11

## 2023-07-11 RX ORDER — POLYETHYLENE GLYCOL 3350 17 G/17G
17 POWDER, FOR SOLUTION ORAL 2 TIMES DAILY
Status: DISCONTINUED | OUTPATIENT
Start: 2023-07-11 | End: 2023-07-12 | Stop reason: HOSPADM

## 2023-07-11 RX ORDER — SODIUM CHLORIDE 0.9 % (FLUSH) 0.9 %
5-40 SYRINGE (ML) INJECTION EVERY 12 HOURS SCHEDULED
Status: DISCONTINUED | OUTPATIENT
Start: 2023-07-11 | End: 2023-07-12 | Stop reason: HOSPADM

## 2023-07-11 RX ORDER — ACETAMINOPHEN 325 MG/1
650 TABLET ORAL EVERY 6 HOURS PRN
Status: DISCONTINUED | OUTPATIENT
Start: 2023-07-11 | End: 2023-07-12 | Stop reason: HOSPADM

## 2023-07-11 RX ORDER — MIDODRINE HYDROCHLORIDE 5 MG/1
2.5 TABLET ORAL 2 TIMES DAILY WITH MEALS
Status: DISCONTINUED | OUTPATIENT
Start: 2023-07-11 | End: 2023-07-12

## 2023-07-11 RX ORDER — CALCITRIOL 0.25 UG/1
0.5 CAPSULE, LIQUID FILLED ORAL
Status: DISCONTINUED | OUTPATIENT
Start: 2023-07-12 | End: 2023-07-12 | Stop reason: HOSPADM

## 2023-07-11 RX ORDER — PANTOPRAZOLE SODIUM 40 MG/1
40 TABLET, DELAYED RELEASE ORAL
Status: DISCONTINUED | OUTPATIENT
Start: 2023-07-11 | End: 2023-07-12 | Stop reason: HOSPADM

## 2023-07-11 RX ORDER — ONDANSETRON 2 MG/ML
4 INJECTION INTRAMUSCULAR; INTRAVENOUS EVERY 6 HOURS PRN
Status: DISCONTINUED | OUTPATIENT
Start: 2023-07-11 | End: 2023-07-12 | Stop reason: HOSPADM

## 2023-07-11 RX ADMIN — HYDROCORTISONE SODIUM SUCCINATE 100 MG: 100 INJECTION, POWDER, FOR SOLUTION INTRAMUSCULAR; INTRAVENOUS at 09:32

## 2023-07-11 RX ADMIN — TIOTROPIUM BROMIDE AND OLODATEROL 2 PUFF: 3.124; 2.736 SPRAY, METERED RESPIRATORY (INHALATION) at 09:52

## 2023-07-11 RX ADMIN — SODIUM CHLORIDE 250 ML: 9 INJECTION, SOLUTION INTRAVENOUS at 05:36

## 2023-07-11 RX ADMIN — MIDODRINE HYDROCHLORIDE 2.5 MG: 5 TABLET ORAL at 06:37

## 2023-07-11 RX ADMIN — MIRTAZAPINE 15 MG: 15 TABLET, FILM COATED ORAL at 20:58

## 2023-07-11 RX ADMIN — APIXABAN 2.5 MG: 2.5 TABLET, FILM COATED ORAL at 09:33

## 2023-07-11 RX ADMIN — MONTELUKAST 10 MG: 10 TABLET, FILM COATED ORAL at 09:33

## 2023-07-11 RX ADMIN — SEVELAMER CARBONATE 800 MG: 800 TABLET, FILM COATED ORAL at 09:35

## 2023-07-11 RX ADMIN — ACETAMINOPHEN 650 MG: 325 TABLET ORAL at 04:24

## 2023-07-11 RX ADMIN — ATORVASTATIN CALCIUM 10 MG: 10 TABLET, FILM COATED ORAL at 09:32

## 2023-07-11 RX ADMIN — PREDNISONE 5 MG: 5 TABLET ORAL at 09:33

## 2023-07-11 RX ADMIN — LEVOFLOXACIN 250 MG: 250 INJECTION, SOLUTION INTRAVENOUS at 01:12

## 2023-07-11 RX ADMIN — MIDODRINE HYDROCHLORIDE 2.5 MG: 5 TABLET ORAL at 16:11

## 2023-07-11 RX ADMIN — PANTOPRAZOLE SODIUM 40 MG: 40 TABLET, DELAYED RELEASE ORAL at 05:43

## 2023-07-11 RX ADMIN — Medication 1 TABLET: at 09:33

## 2023-07-11 RX ADMIN — SODIUM CHLORIDE, PRESERVATIVE FREE 10 ML: 5 INJECTION INTRAVENOUS at 21:21

## 2023-07-11 RX ADMIN — Medication 1 PUFF: at 18:59

## 2023-07-11 RX ADMIN — CARVEDILOL 25 MG: 12.5 TABLET, FILM COATED ORAL at 16:12

## 2023-07-11 RX ADMIN — SEVELAMER CARBONATE 800 MG: 800 TABLET, FILM COATED ORAL at 16:11

## 2023-07-11 RX ADMIN — Medication 1 PUFF: at 09:47

## 2023-07-11 RX ADMIN — APIXABAN 2.5 MG: 2.5 TABLET, FILM COATED ORAL at 20:58

## 2023-07-11 RX ADMIN — SODIUM CHLORIDE, PRESERVATIVE FREE 10 ML: 5 INJECTION INTRAVENOUS at 09:35

## 2023-07-11 ASSESSMENT — PAIN SCALES - GENERAL
PAINLEVEL_OUTOF10: 0
PAINLEVEL_OUTOF10: 8
PAINLEVEL_OUTOF10: 0

## 2023-07-11 ASSESSMENT — PAIN DESCRIPTION - LOCATION: LOCATION: HEAD

## 2023-07-11 ASSESSMENT — PAIN DESCRIPTION - DESCRIPTORS: DESCRIPTORS: ACHING

## 2023-07-11 NOTE — PLAN OF CARE
Problem: Discharge Planning  Goal: Discharge to home or other facility with appropriate resources  7/11/2023 0827 by Khai Melendez RN  Outcome: Progressing  7/11/2023 0229 by Sulaiman Gates LPN  Outcome: Progressing  Flowsheets (Taken 7/11/2023 0123)  Discharge to home or other facility with appropriate resources: Identify discharge learning needs (meds, wound care, etc)     Problem: Safety - Adult  Goal: Free from fall injury  7/11/2023 0827 by Khai Melendez RN  Outcome: Progressing  7/11/2023 0229 by Sulaiman Gates LPN  Outcome: Progressing     Problem: ABCDS Injury Assessment  Goal: Absence of physical injury  7/11/2023 0827 by Khai Melendez RN  Outcome: Progressing  7/11/2023 0229 by Sulaiman Gates LPN  Outcome: Progressing     Problem: Respiratory - Adult  Goal: Achieves optimal ventilation and oxygenation  7/11/2023 0827 by Khai Melendez RN  Outcome: Progressing  7/11/2023 0229 by Sulaiman Gates LPN  Outcome: Progressing     Problem: Cardiovascular - Adult  Goal: Maintains optimal cardiac output and hemodynamic stability  7/11/2023 0827 by Khai Melendez RN  Outcome: Progressing  7/11/2023 0229 by Sulaiman Gates LPN  Outcome: Progressing  Goal: Absence of cardiac dysrhythmias or at baseline  7/11/2023 0229 by Sulaiman Gates LPN  Outcome: Progressing     Problem: Metabolic/Fluid and Electrolytes - Adult  Goal: Electrolytes maintained within normal limits  7/11/2023 0827 by Khai Melendez RN  Outcome: Progressing  7/11/2023 0229 by Sulaiman Gates LPN  Outcome: Progressing  Goal: Hemodynamic stability and optimal renal function maintained  7/11/2023 0827 by Khai Melendez RN  Outcome: Progressing  7/11/2023 0229 by Sulaiman Gates LPN  Outcome: Progressing  Goal: Glucose maintained within prescribed range  7/11/2023 0827 by Khai Melendez RN  Outcome: Progressing  7/11/2023 0229 by Sulaiman Gates LPN  Outcome:

## 2023-07-11 NOTE — PLAN OF CARE
Problem: Discharge Planning  Goal: Discharge to home or other facility with appropriate resources  Outcome: Progressing  Flowsheets (Taken 7/11/2023 0123)  Discharge to home or other facility with appropriate resources: Identify discharge learning needs (meds, wound care, etc)     Problem: Safety - Adult  Goal: Free from fall injury  Outcome: Progressing     Problem: ABCDS Injury Assessment  Goal: Absence of physical injury  Outcome: Progressing     Problem: Respiratory - Adult  Goal: Achieves optimal ventilation and oxygenation  Outcome: Progressing     Problem: Cardiovascular - Adult  Goal: Maintains optimal cardiac output and hemodynamic stability  Outcome: Progressing  Goal: Absence of cardiac dysrhythmias or at baseline  Outcome: Progressing     Problem: Metabolic/Fluid and Electrolytes - Adult  Goal: Electrolytes maintained within normal limits  Outcome: Progressing  Goal: Hemodynamic stability and optimal renal function maintained  Outcome: Progressing  Goal: Glucose maintained within prescribed range  Outcome: Progressing     Problem: Hematologic - Adult  Goal: Maintains hematologic stability  Outcome: Progressing

## 2023-07-11 NOTE — CONSULTS
07/10/23 10:50 PM   Result Value Ref Range    NT Pro-BNP 13,295 (H) 0 - 900 pg/mL   Urinalysis    Collection Time: 07/10/23 11:09 PM   Result Value Ref Range    Color, UA Yellow      Appearance Cloudy      Specific Gravity, UA 1.010 1.005 - 1.030      pH, Urine 8.0 5.0 - 8.0      Protein,  (A) Negative mg/dL    Glucose, UA Negative Negative mg/dL    Ketones, Urine Negative Negative mg/dL    Bilirubin Urine Negative Negative      Blood, Urine Negative Negative      Urobilinogen, Urine 0.2 0.2 - 1.0 EU/dL    Nitrite, Urine Negative Negative      Leukocyte Esterase, Urine Moderate (A) Negative     Urinalysis, Micro    Collection Time: 07/10/23 11:09 PM   Result Value Ref Range    WBC, UA 5-10 0 - 4 /hpf    RBC, UA 0-5 0 - 2 /hpf    Epithelial Cells UA Many 0 - 20 /lpf    BACTERIA, URINE 1+ (A) None /hpf   CBC with Auto Differential    Collection Time: 07/11/23  3:53 AM   Result Value Ref Range    WBC 8.8 4.6 - 13.2 K/uL    RBC 3.12 (L) 4.20 - 5.30 M/uL    Hemoglobin 9.4 (L) 12.0 - 16.0 g/dL    Hematocrit 31.2 (L) 35.0 - 45.0 %    .0 78.0 - 100.0 FL    MCH 30.1 24.0 - 34.0 PG    MCHC 30.1 (L) 31.0 - 37.0 g/dL    RDW 15.3 (H) 11.6 - 14.5 %    Platelets 348 (L) 158 - 420 K/uL    MPV 10.6 9.2 - 11.8 FL    Nucleated RBCs 0.0 0.0  WBC    nRBC 0.00 0.00 - 0.01 K/uL    Neutrophils % 57 40 - 73 %    Lymphocytes % 31 21 - 52 %    Monocytes % 7 3 - 10 %    Eosinophils % 4 0 - 5 %    Basophils % 1 0 - 2 %    Immature Granulocytes 0 0 - 0.5 %    Neutrophils Absolute 5.0 1.8 - 8.0 K/UL    Lymphocytes Absolute 2.8 0.9 - 3.6 K/UL    Monocytes Absolute 0.6 0.05 - 1.2 K/UL    Eosinophils Absolute 0.3 0.0 - 0.4 K/UL    Basophils Absolute 0.1 0.0 - 0.1 K/UL    Absolute Immature Granulocyte 0.0 0.00 - 0.04 K/UL    Differential Type AUTOMATED     Basic Metabolic Panel    Collection Time: 07/11/23  3:53 AM   Result Value Ref Range    Sodium 132 (L) 136 - 145 mmol/L    Potassium 6.0 (H) 3.5 - 5.5 mmol/L    Chloride 102 100

## 2023-07-11 NOTE — PROGRESS NOTES
0110 - Pt admitted to ICU bed #4. Pt is A&Ox4. LS clear with diminished bases. Bolus from ED continues to infuse to 22G in right breast without issue. Levaquin started per orders. Skin C/D/I. A-Paced on telemetry. Oriented to room, call light and bed controls. CBWR     12 - Updated daughter via telephone    [facility] completed. 8052 - This nurse at bedside for lab draw (x3 sticks to obtain labs, pt tolerated well) Bolus completed    0400 - Pt taken to CT with this nurse accompanying. 0425 - Pt returned from CT. PRN tylenol given for pt c/o HA    0500 - Updated daughter via telephone    [phone] - PA notified of BP's, states they will look into chart. 0544 - Morning BMP hemolyzed, phlebotomist at bedside for redraw. Bolus hung per orders. Morning medication given. 1282 - Pt moved to ICU bed #6    0640 - k+ 6.0 called to PA. Per PA, midodrine given now. 26 - Report given to DANA Ramesh

## 2023-07-11 NOTE — PROGRESS NOTES
0700- Assumed care and received report, no distress. 0730- breakfast given, waiting on Nephro Consult    1130- dialysis nurse arrived for treatment/dialysis    1330- dialysis in progress. 1535- dialysis completed - food tray given, no distress. 1605- , vitals taken. 1612- med's given. 1635- patient would like to be discharged today - was cleared by nephrology, notified hospitalist - will keep overnight to monitor vitals and labs, discharge tomorrow if stable.

## 2023-07-11 NOTE — H&P
Hospitalist Admission Note    NAME: Johnathan Oneill   :  1955   MRN:  [id number]     Date/Time:  2023 7:02 AM    Attending: Aziza Leach MD  Patient PCP: Hussein Yusuf MD  ______________________________________________________________________  Given the patient's current clinical presentation, I have a high level of concern for decompensation if discharged from the emergency department. Complex decision making was performed, which includes reviewing the patient's available past medical records, lab, and x-rays. This case was discussed with Emergency Department attending physician for continuity of care, transition/transfer of care to our service.     Assessment / Plan:  Patient Active Problem List    Diagnosis Date Noted    Hypotension 2023    Dizziness 2023    Polymorphic ventricular tachycardia ([address]) 2023    Chronic kidney disease with end stage renal failure on dialysis [facility]) 2023    Hypoglycemia 2023    ESRD (end stage renal disease) on dialysis ([address]) 2020    Essential hypertension 2013    Hyponatremia 2023    Post herpetic neuralgia 2023    CVA (cerebrovascular accident due to intracerebral hemorrhage) ([address]) 2023    Right arm weakness 2023    Gait difficulty 2023    Primary hypertension 2023    TIA (transient ischemic attack) 2023    Chronic diastolic (congestive) heart failure ([address]) 2023    Cardiac arrest ([address]) 2023    Ventricular tachyarrhythmia ([address]) 2023    Hyperkalemia 2022    Volume overload 2022    ESRD needing dialysis ([address]) 2022    Severe protein-calorie malnutrition ([address]) 2022    Stenosis of left vertebral artery 2022    Chronic anticoagulation 2021    Atrial fibrillation ([address]) 2021    Stomatitis 2021    History of DVT (deep vein thrombosis) 2020    Hypothyroidism 2020    Vertigo 2020    GERD

## 2023-07-11 NOTE — PROGRESS NOTES
Hypotension  Dizziness  ESRF/HD - today       Faith López IVF support PRN  Avoid antihypertensive agents  Stress dose of Hydrocortisone  Monitor BP  Nephrology input  May home soon if BP improved and lytes corrected    Radha Macdonald MD

## 2023-07-11 NOTE — ED PROVIDER NOTES
completed with a voice recognition program.  Efforts were made to edit the dictations but occasionally words are mis-transcribed.)       Kaushik Gilliam,   07/11/23 9986

## 2023-07-11 NOTE — PROGRESS NOTES
attempted to visit  with Fabricio Helm, who is a 79 y.o., female. The reason patientcame to hospital is:   Patient Active Problem List    Diagnosis Date Noted    Polymorphic ventricular tachycardia ([address]) 03/06/2023    Chronic kidney disease with end stage renal failure on dialysis [facility]) 03/02/2023    Hypoglycemia 02/14/2023    Hypotension 07/11/2023    Hyponatremia 07/11/2023    Post herpetic neuralgia 07/01/2023    CVA (cerebrovascular accident due to intracerebral hemorrhage) ([address]) 06/30/2023    Right arm weakness 06/30/2023    Gait difficulty 06/30/2023    Primary hypertension 06/30/2023    Dizziness 06/29/2023    TIA (transient ischemic attack) 06/29/2023    Chronic diastolic (congestive) heart failure ([address]) 05/31/2023    Cardiac arrest ([address]) 05/03/2023    Ventricular tachyarrhythmia ([address]) 03/09/2023    Hyperkalemia 11/21/2022    Volume overload 11/21/2022    ESRD needing dialysis ([address]) 11/21/2022    Severe protein-calorie malnutrition ([address]) 09/11/2022    Stenosis of left vertebral artery 04/12/2022    Chronic anticoagulation 05/06/2021    Atrial fibrillation ([address]) 05/06/2021    Stomatitis 03/02/2021    ESRD (end stage renal disease) on dialysis ([address]) 12/28/2020    History of DVT (deep vein thrombosis) 12/28/2020    Hypothyroidism 12/24/2020    Vertigo 12/24/2020    GERD (gastroesophageal reflux disease)     Diverticulitis of intestine 04/02/2016    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    Anemia 11/10/2014    Drug-induced hyperglycemia 06/28/2014    Kidney transplant rejection 04/10/2014    Essential hypertension 08/21/2013    Hyperlipidemia 08/21/2013    Asthma 08/21/2013    Seasonal allergic rhinitis due to pollen 08/21/2013    CMV (cytomegalovirus) antibody positive 04/30/2012    History of kidney transplant 04/30/2012    Migraine without status migrainosus, not intractable 02/24/2010   . Patient is unable to communicate at this time.   offered silent prayer near bedside and left Spiritual

## 2023-07-11 NOTE — CARE COORDINATION
Advance Care Planning     General Advance Care Planning (ACP) Conversation    Date of Conversation: 7/11/2023  Conducted with: Patient with Decision Making Capacity    Healthcare Decision Maker:    Primary Decision Maker: Karishma Sebastian - [phone]  Click here to complete Healthcare Decision Makers including selection of the Healthcare Decision Maker Relationship (ie \"Primary\"). Today we documented Decision Maker(s) consistent with Legal Next of Kin hierarchy.     Content/Action Overview:  Has NO ACP documents/care preferences - requested patient complete ACP documents  Reviewed DNR/DNI and patient elects Full Code (Attempt Resuscitation)  treatment goals      Length of Voluntary ACP Conversation in minutes:  <16 minutes (Non-Billable)    Cullen Villalta RN
Issues/Barriers to RETURNING to current housing: None  Potential Assistance needed at discharge: (P) N/A            Potential DME:    Patient expects to discharge to: (P) [address] [place] for transportation at discharge: (P) Family    Financial    Payor: [employer] MEDICARE / Plan: Joss Price / Product Type: *No Product type* /     Does insurance require precert for SNF: Yes    Potential assistance Purchasing Medications: (P) No  Meds-to-Beds request:        Rona Calhoun [address] [facility] [phone] [facility] [phone]  [address] [facility]  Phone: [phone] Fax: [phone]    [address] [place] [address] [phone] - F [phone] [address] [facility] [address]  Phone: [phone] Fax: [phone]      Notes:    Factors facilitating achievement of predicted outcomes: Family support, Cooperative, and Pleasant    Barriers to discharge: None    Additional Case Management Notes: Discharge Planning/Transition of Care- RUR- 25%. Patient lives at home with her sister, and doesn't use any DME. Patient doesn't want Palliative Care. She is also refusing Home Health. Patient will be returning back home with her sister at discharge. The Plan for Transition of Care is related to the following treatment goals of Dizziness [R42]  Hypotension [I95.9]  ESRD (end stage renal disease) on dialysis ([address]) [N18.6, Z99.2]  Acute cystitis without hematuria [N30.00]  Hypotension, unspecified hypotension type [U75.0]    IF APPLICABLE: The Patient and/or patient representative Elvia Mayers and her family were provided with a choice of provider and agrees with the discharge plan.  Freedom of choice list with basic dialogue that supports the patient's individualized plan of care/goals and shares the quality data associated with the providers was provided to: (P) Patient   Patient Representative Name:       The Patient

## 2023-07-11 NOTE — PROGRESS NOTES
07/11/23 0300   RT Protocol   History Pulmonary Disease 2   Respiratory pattern 0   Breath sounds 0   Cough 0   Indications for Bronchodilator Therapy On home bronchodilators   Bronchodilator Assessment Score 2

## 2023-07-11 NOTE — PROGRESS NOTES
Orders received for P.T., pt states she is at her baseline for mobility she is just dizzy. She agrees to discontinue order at this time.   Darci Padilla, PT, DPT

## 2023-07-11 NOTE — ASSESSMENT & PLAN NOTE
-Bolus administered in the ED  -Diligent to avoid fluid overload  -Pressures briefly improved in the 90s over 50s  -Subsequent however began to deteriorate  -Renal adjusted midodrine ordered at 2.5 mg p.o. twice daily  -Call to nurse to cc bolus given  -Continue to monitor closely  -Status could be addressed with hemodialysis today if stable enough for treatment

## 2023-07-12 VITALS
HEART RATE: 76 BPM | SYSTOLIC BLOOD PRESSURE: 161 MMHG | HEIGHT: 61 IN | TEMPERATURE: 98.2 F | BODY MASS INDEX: 27.68 KG/M2 | WEIGHT: 146.61 LBS | RESPIRATION RATE: 13 BRPM | OXYGEN SATURATION: 100 % | DIASTOLIC BLOOD PRESSURE: 69 MMHG

## 2023-07-12 LAB
ALBUMIN SERPL-MCNC: 3.1 G/DL (ref 3.4–5)
ANION GAP SERPL CALC-SCNC: 8 MMOL/L (ref 3–18)
BASOPHILS # BLD: 0.1 K/UL (ref 0–0.1)
BASOPHILS NFR BLD: 1 % (ref 0–2)
BUN SERPL-MCNC: 39 MG/DL (ref 7–18)
BUN/CREAT SERPL: 8 (ref 12–20)
CA-I BLD-MCNC: 7.6 MG/DL (ref 8.5–10.1)
CHLORIDE SERPL-SCNC: 99 MMOL/L (ref 100–111)
CO2 SERPL-SCNC: 32 MMOL/L (ref 21–32)
CREAT SERPL-MCNC: 5.12 MG/DL (ref 0.6–1.3)
DIFFERENTIAL METHOD BLD: ABNORMAL
EOSINOPHIL # BLD: 0.3 K/UL (ref 0–0.4)
EOSINOPHIL NFR BLD: 3 % (ref 0–5)
ERYTHROCYTE [DISTWIDTH] IN BLOOD BY AUTOMATED COUNT: 15 % (ref 11.6–14.5)
GLUCOSE BLD STRIP.AUTO-MCNC: 81 MG/DL (ref 70–110)
GLUCOSE SERPL-MCNC: 110 MG/DL (ref 74–99)
HCT VFR BLD AUTO: 29.1 % (ref 35–45)
HGB BLD-MCNC: 9.4 G/DL (ref 12–16)
IMM GRANULOCYTES # BLD AUTO: 0 K/UL (ref 0–0.04)
IMM GRANULOCYTES NFR BLD AUTO: 0 % (ref 0–0.5)
LYMPHOCYTES # BLD: 2.2 K/UL (ref 0.9–3.6)
LYMPHOCYTES NFR BLD: 25 % (ref 21–52)
MCH RBC QN AUTO: 31.1 PG (ref 24–34)
MCHC RBC AUTO-ENTMCNC: 32.3 G/DL (ref 31–37)
MCV RBC AUTO: 96.4 FL (ref 78–100)
MONOCYTES # BLD: 0.7 K/UL (ref 0.05–1.2)
MONOCYTES NFR BLD: 7 % (ref 3–10)
NEUTS SEG # BLD: 5.7 K/UL (ref 1.8–8)
NEUTS SEG NFR BLD: 64 % (ref 40–73)
NRBC # BLD: 0 K/UL (ref 0–0.01)
NRBC BLD-RTO: 0 PER 100 WBC
PERFORMED BY:: NORMAL
PHOSPHATE SERPL-MCNC: 2.8 MG/DL (ref 2.5–4.9)
PLATELET # BLD AUTO: 153 K/UL (ref 135–420)
PMV BLD AUTO: 10.1 FL (ref 9.2–11.8)
POTASSIUM SERPL-SCNC: 3.8 MMOL/L (ref 3.5–5.5)
RBC # BLD AUTO: 3.02 M/UL (ref 4.2–5.3)
SODIUM SERPL-SCNC: 139 MMOL/L (ref 136–145)
WBC # BLD AUTO: 8.9 K/UL (ref 4.6–13.2)

## 2023-07-12 PROCEDURE — 94761 N-INVAS EAR/PLS OXIMETRY MLT: CPT

## 2023-07-12 PROCEDURE — 6370000000 HC RX 637 (ALT 250 FOR IP): Performed by: PHYSICIAN ASSISTANT

## 2023-07-12 PROCEDURE — 85025 COMPLETE CBC W/AUTO DIFF WBC: CPT

## 2023-07-12 PROCEDURE — 82962 GLUCOSE BLOOD TEST: CPT

## 2023-07-12 PROCEDURE — 94640 AIRWAY INHALATION TREATMENT: CPT

## 2023-07-12 PROCEDURE — 80069 RENAL FUNCTION PANEL: CPT

## 2023-07-12 PROCEDURE — 6370000000 HC RX 637 (ALT 250 FOR IP): Performed by: INTERNAL MEDICINE

## 2023-07-12 PROCEDURE — 2580000003 HC RX 258: Performed by: PHYSICIAN ASSISTANT

## 2023-07-12 PROCEDURE — 36415 COLL VENOUS BLD VENIPUNCTURE: CPT

## 2023-07-12 RX ADMIN — SODIUM CHLORIDE, PRESERVATIVE FREE 10 ML: 5 INJECTION INTRAVENOUS at 08:04

## 2023-07-12 RX ADMIN — Medication 1 TABLET: at 08:02

## 2023-07-12 RX ADMIN — PREDNISONE 5 MG: 5 TABLET ORAL at 08:03

## 2023-07-12 RX ADMIN — PANTOPRAZOLE SODIUM 40 MG: 40 TABLET, DELAYED RELEASE ORAL at 06:12

## 2023-07-12 RX ADMIN — MONTELUKAST 10 MG: 10 TABLET, FILM COATED ORAL at 08:03

## 2023-07-12 RX ADMIN — SEVELAMER CARBONATE 800 MG: 800 TABLET, FILM COATED ORAL at 08:02

## 2023-07-12 RX ADMIN — Medication 1 PUFF: at 07:28

## 2023-07-12 RX ADMIN — FLUTICASONE PROPIONATE 1 SPRAY: 50 SPRAY, METERED NASAL at 08:02

## 2023-07-12 RX ADMIN — APIXABAN 2.5 MG: 2.5 TABLET, FILM COATED ORAL at 08:03

## 2023-07-12 RX ADMIN — CALCITRIOL 0.5 MCG: 0.25 CAPSULE ORAL at 08:02

## 2023-07-12 RX ADMIN — NIFEDIPINE 90 MG: 30 TABLET, EXTENDED RELEASE ORAL at 08:02

## 2023-07-12 RX ADMIN — CARVEDILOL 25 MG: 12.5 TABLET, FILM COATED ORAL at 08:03

## 2023-07-12 RX ADMIN — ATORVASTATIN CALCIUM 10 MG: 10 TABLET, FILM COATED ORAL at 08:03

## 2023-07-12 RX ADMIN — TIOTROPIUM BROMIDE AND OLODATEROL 2 PUFF: 3.124; 2.736 SPRAY, METERED RESPIRATORY (INHALATION) at 07:28

## 2023-07-12 ASSESSMENT — PAIN SCALES - GENERAL: PAINLEVEL_OUTOF10: 0

## 2023-07-12 NOTE — DISCHARGE SUMMARY
Discharge Summary       PATIENT ID: David Ortiz  MRN: [id number]   YOB: 1955    DATE OF ADMISSION: 7/10/2023  9:50 PM    DATE OF DISCHARGE: 07/12/23    PRIMARY CARE PROVIDER: Duke Patrick MD     ATTENDING PHYSICIAN: Warner Mark MD  DISCHARGING PROVIDER: Warner Mark MD        CONSULTATIONS: IP CONSULT TO NEPHROLOGY  IP CONSULT TO CASE MANAGEMENT    PROCEDURES/SURGERIES: * No surgery found *    ADMITTING [address] [facility], [address] COURSE:   Tamara Perry is a 79 y.o.  female who presents with low blood pressure, in the context of her medical history which includes end-stage renal disease on hemodialysis, asthma, COPD, cancer, hypertension, hyperlipidemia and additional conditions noted in PMH. [facility] where she had been transferred from [facility] for right arm weakness and dizziness. She was comprehensively worked up for TIA CVA which was found negative for the same. She was however found to have significant pain on the left side due to shingles/herpetic neuralgia she was given topical and renally adjusted gabapentin. Other chronic conditions were appropriately managed to include her end-stage renal disease. When she was discharged from [facility] her blood pressures were normotensive. Reason for presentation with this admission to the emergency department was dizziness that she experienced while at home sitting in the waves of acute dizziness overcame her. She took her blood pressure and noticed that it was very low. During hour of the problem persisted and she called EMS where they found that her blood pressure per the patient was 60 over 30s. She had not been taking her blood pressure medicine per history. Patient was evaluated in the emergency department and found to have a low blood pressure, however not as low as what was reported in the field by EMS. She was bolused fluids.   Some

## 2023-07-12 NOTE — PROGRESS NOTES
Assumed care of pt 0700. Pt alert and oriented. Pt DAS. Afebrile. Pt voids on own OOb to bathroom. Pt tolerating diet. VSS  1015 Pt IV removed prior to discharge. Discharge instructions and paperwork provided to patient. Pt daughter came to bedside to  patient. All belongings sent with patient and family. PT transported via wheelchair downstairs to transportation.

## 2023-07-13 ENCOUNTER — CARE COORDINATION (OUTPATIENT)
Facility: CLINIC | Age: 68
End: 2023-07-13

## 2023-07-13 LAB
BACTERIA SPEC CULT: ABNORMAL
BACTERIA SPEC CULT: ABNORMAL
COLONY COUNT, CNT: ABNORMAL
Lab: ABNORMAL

## 2023-07-13 NOTE — CARE COORDINATION
[facility] Care Transitions Initial Follow Up Call    Call within 2 business days of discharge: Yes    Patient Current Location:  Home: [address] [place] [phone]    Care Transition Nurse contacted the patient by telephone to perform post hospital discharge assessment. Verified name and  with patient as identifiers. Provided introduction to self, and explanation of the Care Transition Nurse role. Patient: Rodger Fernandez Patient : 1955   MRN: [id number]  Reason for Admission: Hypotension. Discharge Date: 23 RARS: Readmission Risk Score: 24.6      Last Discharge [address] [facility],[address] [facility]       Date Complaint Diagnosis Description Type Department Provider    7/10/23 Dizziness Hypotension, unspecified hypotension type . .. ED to Hosp-Admission (Discharged) (ADMITTED) [address] MD; Boone Tirado. .. Was this an external facility discharge? No Discharge Facility: n/a    Challenges to be reviewed by the provider   Additional needs identified to be addressed with provider: No  none               Method of communication with provider: none. Patient reported that  she is doing well. Unable to complete post hospital discharge assessment as patient kept the conversation short and concluded the call.      Follow Up  Future Appointments   Date Time Provider [address]   2023  2:30 PM Tho Michele MD [facility] EDWIGE Cooley RN

## 2023-07-14 ENCOUNTER — CARE COORDINATION (OUTPATIENT)
Facility: CLINIC | Age: 68
End: 2023-07-14

## 2023-07-14 NOTE — CARE COORDINATION
[facility] Care Transitions Initial Follow Up Call    Call within 2 business days of discharge: Yes    Care Transitions Outreach Attempt    CTN Attempted to reach patient for transitions of care follow up. Unable to reach patient. Unable to leave a voice message. Recording \" can't accept more messages. \"    CTN reached Pt. Daughter Ms. Jeremi Campbell on phone. Pt. Daughter informed CTN that Pt. Is doing good. No new or worsening of symptoms verbalized by Ms. Omero Naik at this time. Ms. Omero Naik is aware of Pt. PCP appointment. Offer earlier PCP appt., Pt. Daughter declined and prefers already scheduled appt. Pt. Daughter reported that Pt. Has vascular Procedure on Monday and Dialysis on Tuesday. Unable to complete post hospital assessment as Pt. Albert reported that she is currently at her Dr. Nathan Aguillon. No questions, concerns and/or needs at this time as per Pt. Daughter. During this outreach, CTN did not release any HIPAA protected information and solely obtained information from Pt. Daughter. Patient: Yudelka Reese Patient : 1955 MRN: [id number]    Last Discharge [address] [facility],[address]       Date Complaint Diagnosis Description Type Department Provider    7/10/23 Dizziness Hypotension, unspecified hypotension type . .. ED to Hosp-Admission (Discharged) (ADMITTED) [address] MD; Raghavendra Warner. ..             [facility] follow up appointment(s):   Future Appointments   Date Time Provider [address]   2023  1:00 PM Josefina Weber MD [facility] BS AMB   2023  2:30 PM Josefina Weber MD [facility] BS AMB       Cesar Collier, MICHELLE

## 2023-07-17 ENCOUNTER — CARE COORDINATION (OUTPATIENT)
Facility: CLINIC | Age: 68
End: 2023-07-17

## 2023-07-19 ENCOUNTER — CARE COORDINATION (OUTPATIENT)
Facility: CLINIC | Age: 68
End: 2023-07-19

## 2023-07-19 NOTE — CARE COORDINATION
[facility] Care Transitions Initial Follow Up Call    Call within 2 business days of discharge: Yes    Care Transitions Outreach Attempt    CTN Attempted to reach patient for transitions of care follow up. Unable to reach patient. Unable to leave a voice message. Recording \" can't accept more messages. \"    CTN reached Pt. Daughter Ms. Seymour Poole on phone. Pt. Daughter informed CTN that Pt. Is doing good. No new or worsening of symptoms verbalized by Ms. Mehul Boles at this time. Ms. Mehul Boles reported that Pt. BP has been good for the past 4 days. Pt. Daughter states that Pt. Is currently sleeping at this time. No questions, concerns and/or needs at this time as per Pt. Daughter. During this outreach, CTN did not release any HIPAA protected information and solely obtained information from Pt. Daughter. Patient: Fabricio Helm Patient : 1955 MRN: [id number]    Last Discharge [address]       Date Complaint Diagnosis Description Type Department Provider    7/10/23 Dizziness Hypotension, unspecified hypotension type . .. ED to Hosp-Admission (Discharged) (ADMITTED) [address] MD; Freddy Beltran. ..             [facility] follow up appointment(s):   Future Appointments   Date Time Provider [address]   2023  1:00 PM Graciela Wharton MD [facility] BS AMB   2023  2:30 PM Graciela Wharton MD [facility] BS AMB       Noemiline Dennie Shepherd, MICHELLE

## 2023-07-26 ENCOUNTER — OFFICE VISIT (OUTPATIENT)
Facility: CLINIC | Age: 68
End: 2023-07-26

## 2023-07-26 VITALS
HEIGHT: 61 IN | DIASTOLIC BLOOD PRESSURE: 76 MMHG | HEART RATE: 64 BPM | OXYGEN SATURATION: 99 % | BODY MASS INDEX: 26.45 KG/M2 | WEIGHT: 140.1 LBS | RESPIRATION RATE: 16 BRPM | SYSTOLIC BLOOD PRESSURE: 182 MMHG | TEMPERATURE: 97.7 F

## 2023-07-26 DIAGNOSIS — N18.6 ESRD (END STAGE RENAL DISEASE) ON DIALYSIS (HCC): ICD-10-CM

## 2023-07-26 DIAGNOSIS — I95.9 HYPOTENSION, UNSPECIFIED HYPOTENSION TYPE: ICD-10-CM

## 2023-07-26 DIAGNOSIS — R21 RASH: ICD-10-CM

## 2023-07-26 DIAGNOSIS — Z99.2 ESRD (END STAGE RENAL DISEASE) ON DIALYSIS (HCC): ICD-10-CM

## 2023-07-26 DIAGNOSIS — Z09 HOSPITAL DISCHARGE FOLLOW-UP: Primary | ICD-10-CM

## 2023-07-26 DIAGNOSIS — I10 ESSENTIAL HYPERTENSION: ICD-10-CM

## 2023-07-26 RX ORDER — TRIAMCINOLONE ACETONIDE 0.25 MG/G
OINTMENT TOPICAL
Qty: 80 G | Refills: 3 | Status: SHIPPED | OUTPATIENT
Start: 2023-07-26 | End: 2023-08-02

## 2023-07-26 SDOH — ECONOMIC STABILITY: HOUSING INSECURITY
IN THE LAST 12 MONTHS, WAS THERE A TIME WHEN YOU DID NOT HAVE A STEADY PLACE TO SLEEP OR SLEPT IN A SHELTER (INCLUDING NOW)?: PATIENT REFUSED

## 2023-07-26 SDOH — ECONOMIC STABILITY: FOOD INSECURITY: WITHIN THE PAST 12 MONTHS, THE FOOD YOU BOUGHT JUST DIDN'T LAST AND YOU DIDN'T HAVE MONEY TO GET MORE.: PATIENT DECLINED

## 2023-07-26 SDOH — ECONOMIC STABILITY: FOOD INSECURITY: WITHIN THE PAST 12 MONTHS, YOU WORRIED THAT YOUR FOOD WOULD RUN OUT BEFORE YOU GOT MONEY TO BUY MORE.: PATIENT DECLINED

## 2023-07-26 SDOH — ECONOMIC STABILITY: INCOME INSECURITY: HOW HARD IS IT FOR YOU TO PAY FOR THE VERY BASICS LIKE FOOD, HOUSING, MEDICAL CARE, AND HEATING?: PATIENT DECLINED

## 2023-07-26 ASSESSMENT — PATIENT HEALTH QUESTIONNAIRE - PHQ9
3. TROUBLE FALLING OR STAYING ASLEEP: 1
7. TROUBLE CONCENTRATING ON THINGS, SUCH AS READING THE NEWSPAPER OR WATCHING TELEVISION: 0
SUM OF ALL RESPONSES TO PHQ9 QUESTIONS 1 & 2: 1
SUM OF ALL RESPONSES TO PHQ QUESTIONS 1-9: 6
10. IF YOU CHECKED OFF ANY PROBLEMS, HOW DIFFICULT HAVE THESE PROBLEMS MADE IT FOR YOU TO DO YOUR WORK, TAKE CARE OF THINGS AT HOME, OR GET ALONG WITH OTHER PEOPLE: 0
SUM OF ALL RESPONSES TO PHQ QUESTIONS 1-9: 6
8. MOVING OR SPEAKING SO SLOWLY THAT OTHER PEOPLE COULD HAVE NOTICED. OR THE OPPOSITE, BEING SO FIGETY OR RESTLESS THAT YOU HAVE BEEN MOVING AROUND A LOT MORE THAN USUAL: 1
1. LITTLE INTEREST OR PLEASURE IN DOING THINGS: 0
6. FEELING BAD ABOUT YOURSELF - OR THAT YOU ARE A FAILURE OR HAVE LET YOURSELF OR YOUR FAMILY DOWN: 1
9. THOUGHTS THAT YOU WOULD BE BETTER OFF DEAD, OR OF HURTING YOURSELF: 0
2. FEELING DOWN, DEPRESSED OR HOPELESS: 1
SUM OF ALL RESPONSES TO PHQ QUESTIONS 1-9: 6
5. POOR APPETITE OR OVEREATING: 1
SUM OF ALL RESPONSES TO PHQ QUESTIONS 1-9: 6
4. FEELING TIRED OR HAVING LITTLE ENERGY: 1

## 2023-07-26 NOTE — PROGRESS NOTES
Du Huizar presents today for   Chief Complaint   Patient presents with    Follow-Up from [facility]ER[facility]     7/10/23-7/12/23       Is someone accompanying this pt? Yes her daughter     Is the patient using any DME equipment during OV? No     Health Maintenance reviewed and discussed and ordered per Provider. Health Maintenance Due   Topic Date Due    Hepatitis C screen  Never done    DTaP/Tdap/Td vaccine (1 - Tdap) Never done    Annual Wellness Visit (AWV)  01/21/2023    Shingles vaccine (2 of 2) 04/04/2023   . Coordination of Care:  1. \"Have you been to the ER, urgent care clinic since your last visit? Hospitalized since your last visit? \" Yes Hospital     2. \"Have you seen or consulted any other health care providers outside of the [address] [place] [address] since your last visit? \" No    3. For patients aged 43-73: Has the patient had a colonoscopy? Yes- No care gap present    If the patient is female:    4. For patients aged 43-66: Has the patient had a mammogram within the past 2 years? Yes- No care gap present    5. For patients aged 21-65: Has the patient had a pap smear?  N/A based on age/sex

## 2023-07-28 ENCOUNTER — CARE COORDINATION (OUTPATIENT)
Facility: CLINIC | Age: 68
End: 2023-07-28

## 2023-07-28 NOTE — CARE COORDINATION
[facility] Care Transitions Follow Up Call    Care Transitions Outreach Attempt    CTN Attempted to reach patient for transitions of care follow up. Unable to reach patient. Unable to leave a voice message. Recording \" can't accept more messages. \"    CTN reached Pt. Daughter Ms. Tom Dunaway on phone. Pt. Daughter informed CTN that Pt. Is currently at [address]. Pt. Daughter kept the conversation short and concluded the call. During this outreach, CTN did not release any HIPAA protected information and solely obtained information from Pt. Daughter. Patient: Carlos Carrillo  Patient : 1955   MRN: [id number]    Discharge Date: 23 RARS: Readmission Risk Score: 24.6      Needs to be reviewed by the provider   Additional needs identified to be addressed with provider: No  none             Method of communication with provider: none. Follow Up  Future Appointments   Date Time Provider [address]   2023  2:30 PM Marielena Melendez MD [facility] BS AMB   2023  2:00 PM Marielena Melendez MD [facility] BS AMB       Care Transition Nurse provided contact information for future needs.       Vincent Malone RN

## 2023-08-07 ENCOUNTER — CARE COORDINATION (OUTPATIENT)
Facility: CLINIC | Age: 68
End: 2023-08-07

## 2023-08-07 NOTE — CARE COORDINATION
[address] [facility] [address] Care Transitions Follow Up Call    Care Transition Nurse contacted the patient by telephone for care transitions follow up. Verified name and  with patient as identifiers. Patient: Rodger Fernandez  Patient : 1955   MRN: [id number]  Reason for Admission: Hypotension . Discharge Date: 23 RARS: Readmission Risk Score: 24.6      Needs to be reviewed by the provider   Additional needs identified to be addressed with provider: No  none             Method of communication with provider: none. Patient reported that she is doing well. No new or worsening of symptoms reported by Pt. At this time. Pt. States that her BP is also good. Pt. States that she is closely watching her BP at home. Pt. Reports that she has everything she needs to keep her well at home. No questions, concerns and/or needs at this time as per Pt. Pt. Kept the conversation short. Follow Up  Future Appointments   Date Time Provider [address]   2023  2:30 PM Tho Michele MD [facility] BS AMB   2023  2:00 PM Tho Michele MD [facility] BS AMB     Care Transition Nurse provided contact information for future needs. Plan for follow-up call in 7-10 days based on severity of symptoms and risk factors. Plan for next call:  follow up symptoms, BP and  assess for any needs.      Jeannine Pool RN

## 2023-08-16 ENCOUNTER — TELEPHONE (OUTPATIENT)
Facility: CLINIC | Age: 68
End: 2023-08-16

## 2023-08-16 ENCOUNTER — OFFICE VISIT (OUTPATIENT)
Facility: CLINIC | Age: 68
End: 2023-08-16
Payer: MEDICARE

## 2023-08-16 VITALS
TEMPERATURE: 97.8 F | WEIGHT: 141 LBS | DIASTOLIC BLOOD PRESSURE: 73 MMHG | HEART RATE: 80 BPM | BODY MASS INDEX: 26.62 KG/M2 | OXYGEN SATURATION: 100 % | SYSTOLIC BLOOD PRESSURE: 154 MMHG | HEIGHT: 61 IN

## 2023-08-16 DIAGNOSIS — Z00.00 MEDICARE ANNUAL WELLNESS VISIT, SUBSEQUENT: Primary | ICD-10-CM

## 2023-08-16 DIAGNOSIS — Z71.89 ADVANCED CARE PLANNING/COUNSELING DISCUSSION: ICD-10-CM

## 2023-08-16 DIAGNOSIS — B37.0 ORAL THRUSH: ICD-10-CM

## 2023-08-16 DIAGNOSIS — N18.6 ESRD (END STAGE RENAL DISEASE) ON DIALYSIS (HCC): ICD-10-CM

## 2023-08-16 DIAGNOSIS — Z12.31 ENCOUNTER FOR SCREENING MAMMOGRAM FOR MALIGNANT NEOPLASM OF BREAST: ICD-10-CM

## 2023-08-16 DIAGNOSIS — I10 UNCONTROLLED HYPERTENSION: ICD-10-CM

## 2023-08-16 DIAGNOSIS — N63.42 SUBAREOLAR MASS OF LEFT BREAST: ICD-10-CM

## 2023-08-16 DIAGNOSIS — Z99.2 ESRD (END STAGE RENAL DISEASE) ON DIALYSIS (HCC): ICD-10-CM

## 2023-08-16 PROCEDURE — G8417 CALC BMI ABV UP PARAM F/U: HCPCS | Performed by: STUDENT IN AN ORGANIZED HEALTH CARE EDUCATION/TRAINING PROGRAM

## 2023-08-16 PROCEDURE — 3017F COLORECTAL CA SCREEN DOC REV: CPT | Performed by: STUDENT IN AN ORGANIZED HEALTH CARE EDUCATION/TRAINING PROGRAM

## 2023-08-16 PROCEDURE — 3078F DIAST BP <80 MM HG: CPT | Performed by: STUDENT IN AN ORGANIZED HEALTH CARE EDUCATION/TRAINING PROGRAM

## 2023-08-16 PROCEDURE — 99497 ADVNCD CARE PLAN 30 MIN: CPT | Performed by: STUDENT IN AN ORGANIZED HEALTH CARE EDUCATION/TRAINING PROGRAM

## 2023-08-16 PROCEDURE — G0439 PPPS, SUBSEQ VISIT: HCPCS | Performed by: STUDENT IN AN ORGANIZED HEALTH CARE EDUCATION/TRAINING PROGRAM

## 2023-08-16 PROCEDURE — 1123F ACP DISCUSS/DSCN MKR DOCD: CPT | Performed by: STUDENT IN AN ORGANIZED HEALTH CARE EDUCATION/TRAINING PROGRAM

## 2023-08-16 PROCEDURE — G8399 PT W/DXA RESULTS DOCUMENT: HCPCS | Performed by: STUDENT IN AN ORGANIZED HEALTH CARE EDUCATION/TRAINING PROGRAM

## 2023-08-16 PROCEDURE — 99213 OFFICE O/P EST LOW 20 MIN: CPT | Performed by: STUDENT IN AN ORGANIZED HEALTH CARE EDUCATION/TRAINING PROGRAM

## 2023-08-16 PROCEDURE — 1036F TOBACCO NON-USER: CPT | Performed by: STUDENT IN AN ORGANIZED HEALTH CARE EDUCATION/TRAINING PROGRAM

## 2023-08-16 PROCEDURE — G8428 CUR MEDS NOT DOCUMENT: HCPCS | Performed by: STUDENT IN AN ORGANIZED HEALTH CARE EDUCATION/TRAINING PROGRAM

## 2023-08-16 PROCEDURE — 3077F SYST BP >= 140 MM HG: CPT | Performed by: STUDENT IN AN ORGANIZED HEALTH CARE EDUCATION/TRAINING PROGRAM

## 2023-08-16 PROCEDURE — 1090F PRES/ABSN URINE INCON ASSESS: CPT | Performed by: STUDENT IN AN ORGANIZED HEALTH CARE EDUCATION/TRAINING PROGRAM

## 2023-08-16 RX ORDER — HYDRALAZINE HYDROCHLORIDE 25 MG/1
25 TABLET, FILM COATED ORAL 3 TIMES DAILY PRN
Qty: 270 TABLET | Refills: 3 | Status: SHIPPED | OUTPATIENT
Start: 2023-08-16

## 2023-08-16 RX ORDER — CINACALCET 30 MG/1
TABLET, FILM COATED ORAL
COMMUNITY
Start: 2023-08-15

## 2023-08-16 RX ORDER — CARVEDILOL 6.25 MG/1
TABLET ORAL
COMMUNITY
Start: 2023-08-04

## 2023-08-16 ASSESSMENT — PATIENT HEALTH QUESTIONNAIRE - PHQ9
9. THOUGHTS THAT YOU WOULD BE BETTER OFF DEAD, OR OF HURTING YOURSELF: 0
SUM OF ALL RESPONSES TO PHQ QUESTIONS 1-9: 6
6. FEELING BAD ABOUT YOURSELF - OR THAT YOU ARE A FAILURE OR HAVE LET YOURSELF OR YOUR FAMILY DOWN: 0
7. TROUBLE CONCENTRATING ON THINGS, SUCH AS READING THE NEWSPAPER OR WATCHING TELEVISION: 0
SUM OF ALL RESPONSES TO PHQ9 QUESTIONS 1 & 2: 1
1. LITTLE INTEREST OR PLEASURE IN DOING THINGS: 0
SUM OF ALL RESPONSES TO PHQ QUESTIONS 1-9: 6
4. FEELING TIRED OR HAVING LITTLE ENERGY: 3
2. FEELING DOWN, DEPRESSED OR HOPELESS: 1
5. POOR APPETITE OR OVEREATING: 0
10. IF YOU CHECKED OFF ANY PROBLEMS, HOW DIFFICULT HAVE THESE PROBLEMS MADE IT FOR YOU TO DO YOUR WORK, TAKE CARE OF THINGS AT HOME, OR GET ALONG WITH OTHER PEOPLE: 1
8. MOVING OR SPEAKING SO SLOWLY THAT OTHER PEOPLE COULD HAVE NOTICED. OR THE OPPOSITE, BEING SO FIGETY OR RESTLESS THAT YOU HAVE BEEN MOVING AROUND A LOT MORE THAN USUAL: 1
3. TROUBLE FALLING OR STAYING ASLEEP: 1

## 2023-08-16 ASSESSMENT — LIFESTYLE VARIABLES
HOW MANY STANDARD DRINKS CONTAINING ALCOHOL DO YOU HAVE ON A TYPICAL DAY: PATIENT DOES NOT DRINK
HOW OFTEN DO YOU HAVE A DRINK CONTAINING ALCOHOL: NEVER

## 2023-08-16 NOTE — PROGRESS NOTES
Cullen Thomas presents today for   Chief Complaint   Patient presents with    Follow-up     Blood pressure         Is someone accompanying this pt? No    Is the patient using any DME equipment during OV? No     Health Maintenance reviewed and discussed and ordered per Provider. Health Maintenance Due   Topic Date Due    Hepatitis C screen  Never done    DTaP/Tdap/Td vaccine (1 - Tdap) Never done    Annual Wellness Visit (AWV)  01/21/2023    Shingles vaccine (2 of 2) 04/04/2023    Flu vaccine (1) 08/01/2023   . Coordination of Care:  1. \"Have you been to the ER, urgent care clinic since your last visit? Hospitalized since your last visit? \" No    2. \"Have you seen or consulted any other health care providers outside of the [address] [place] [address] since your last visit? \" No    3. For patients aged 43-73: Has the patient had a colonoscopy? Yes- No care gap present    If the patient is female:    4. For patients aged 43-66: Has the patient had a mammogram within the past 2 years? Yes- No care gap present    5. For patients aged 21-65: Has the patient had a pap smear?  Yes- No care gap present

## 2023-08-16 NOTE — PATIENT INSTRUCTIONS
Personalized Preventive Plan for Du Huizar - [birth year]  Medicare offers a range of preventive health benefits. Some of the tests and screenings are paid in full while other may be subject to a deductible, co-insurance, and/or copay. Some of these benefits include a comprehensive review of your medical history including lifestyle, illnesses that may run in your family, and various assessments and screenings as appropriate. After reviewing your medical record and screening and assessments performed today your provider may have ordered immunizations, labs, imaging, and/or referrals for you. A list of these orders (if applicable) as well as your Preventive Care list are included within your After Visit Summary for your review. Other Preventive Recommendations:    A preventive eye exam performed by an eye specialist is recommended every 1-2 years to screen for glaucoma; cataracts, macular degeneration, and other eye disorders. A preventive dental visit is recommended every 6 months. Try to get at least 150 minutes of exercise per week or 10,000 steps per day on a pedometer . Order or download the FREE \"Exercise & Physical Activity: Your Everyday Guide\" from [employer]. Call 8-847.115.5141 or search [employer] online. You need 2954-6950 mg of calcium and 3225-0232 IU of vitamin D per day. It is possible to meet your calcium requirement with diet alone, but a vitamin D supplement is usually necessary to meet this goal.  When exposed to the sun, use a sunscreen that protects against both UVA and UVB radiation with an SPF of 30 or greater. Reapply every 2 to 3 hours or after sweating, drying off with a towel, or swimming. Always wear a seat belt when traveling in a car. Always wear a helmet when riding a bicycle or motorcycle.

## 2023-08-16 NOTE — PROGRESS NOTES
Medicare Annual Wellness Visit    Nettie Bah is here for Follow-up (Blood pressure/), Other (Knot on her left breast.), and Medicare AWV    Assessment & Plan   Medicare annual wellness visit, subsequent  Advanced care planning/counseling discussion  Comments:  Discussed the importance advance care planning. We will provide paperwork she can fill out and bring to next appointment  Uncontrolled hypertension  Comments:  Uncontrolled. Goal less than 140/90. History of hypotensive episodes. Will provide hydralazine 25 mg as needed for blood pressure greater than 160/100  Orders:  -     hydrALAZINE (APRESOLINE) 25 MG tablet; Take 1 tablet by mouth 3 times daily as needed (hypertension >160/100), Disp-270 tablet, R-3Normal  ESRD (end stage renal disease) on dialysis ([address])  Comments:  Stable. Tuesdays Thursdays and Saturdays  Subareolar mass of left breast  Comments:  New as of a week ago. Nonpainful. Will get mammogram to rule out malignancy versus cyst  Orders:  -     MARTINE DIGITAL SCREEN W OR WO CAD BILATERAL; Future  Encounter for screening mammogram for malignant neoplasm of breast  -     MARTINE DIGITAL SCREEN W OR WO CAD BILATERAL; Future    Recommendations for Preventive Services Due: see orders and patient instructions/AVS.  Recommended screening schedule for the next 5-10 years is provided to the patient in written form: see Patient Instructions/AVS.     No follow-ups on file. Subjective       Patient's complete Health Risk Assessment and screening values have been reviewed and are found in Flowsheets. The following problems were reviewed today and where indicated follow up appointments were made and/or referrals ordered.     Positive Risk Factor Screenings with Interventions:        Depression:  PHQ-2 Score: 1  PHQ-9 Total Score: 6    Interpretation:   1-4 = minimal  5-9 = mild  10-14 = moderate  15-19 = moderately severe  20-27 = severe  Interventions:  See A/P for any pertinent orders

## 2023-08-16 NOTE — TELEPHONE ENCOUNTER
Patient called stating that the Dr was supposed to send in medication for the thrash in her throat today when she was seen today. 8/16/23.

## 2023-08-21 DIAGNOSIS — B37.0 ORAL THRUSH: ICD-10-CM

## 2023-08-21 DIAGNOSIS — I10 UNCONTROLLED HYPERTENSION: ICD-10-CM

## 2023-08-21 RX ORDER — HYDRALAZINE HYDROCHLORIDE 25 MG/1
25 TABLET, FILM COATED ORAL 3 TIMES DAILY PRN
Qty: 270 TABLET | Refills: 3 | Status: SHIPPED | OUTPATIENT
Start: 2023-08-21

## 2023-08-22 ENCOUNTER — TELEPHONE (OUTPATIENT)
Facility: CLINIC | Age: 68
End: 2023-08-22

## 2023-08-22 NOTE — TELEPHONE ENCOUNTER
Patient called twice today asking about paperwork she had given you at her last appointment. Wants to know if it is ready to be picked up.

## 2023-08-23 RX ORDER — FLUTICASONE PROPIONATE 50 MCG
SPRAY, SUSPENSION (ML) NASAL
Qty: 32 G | Refills: 0 | Status: SHIPPED | OUTPATIENT
Start: 2023-08-23

## 2023-08-24 ENCOUNTER — CARE COORDINATION (OUTPATIENT)
Facility: CLINIC | Age: 68
End: 2023-08-24

## 2023-08-24 DIAGNOSIS — N63.42 SUBAREOLAR MASS OF LEFT BREAST: Primary | ICD-10-CM

## 2023-08-24 NOTE — CARE COORDINATION
[facility] Care Transitions Follow Up Call    Care Transition Nurse contacted the patient by telephone for care transitions follow up. Verified name and  with patient as identifiers. Patient: Rochele Nissen  Patient : 1955   MRN: [id number]    Discharge Date: 23 RARS: Readmission Risk Score: 24.6      Needs to be reviewed by the provider   Additional needs identified to be addressed with provider: No  none             Method of communication with provider: none. Patient reported that she is doing good. No new or worsening of symptoms reported by Pt. At this time. Pt. Reports that she has good support at home and has everything she needs to keep her well at home. Pt. Kept the conversation short as she states that she is getting ready for dialysis. No questions, concerns and/or needs at this time as per Pt. Pt. Kept the conversation short and concluded the call . This episode is closed and resolved. Follow Up  Future Appointments   Date Time Provider [address]   2023  2:00 PM Loy Garcia MD [facility] BS AMB   2023  2:30 PM Loy Garcia MD [facility] BS AMB       Care Transition Nurse provided contact information for future needs.     Mick Singer RN

## 2023-09-05 ENCOUNTER — APPOINTMENT (OUTPATIENT)
Age: 68
End: 2023-09-05
Payer: MEDICARE

## 2023-09-05 ENCOUNTER — HOSPITAL ENCOUNTER (EMERGENCY)
Age: 68
Discharge: HOME | End: 2023-09-05
Attending: EMERGENCY MEDICINE
Payer: MEDICARE

## 2023-09-05 VITALS
DIASTOLIC BLOOD PRESSURE: 73 MMHG | SYSTOLIC BLOOD PRESSURE: 167 MMHG | BODY MASS INDEX: 26.81 KG/M2 | HEART RATE: 67 BPM | HEIGHT: 61 IN | TEMPERATURE: 98.2 F | RESPIRATION RATE: 15 BRPM | OXYGEN SATURATION: 98 % | WEIGHT: 142 LBS

## 2023-09-05 DIAGNOSIS — R06.02 SHORTNESS OF BREATH: Primary | ICD-10-CM

## 2023-09-05 DIAGNOSIS — Z99.2 STAGE 5 CHRONIC KIDNEY DISEASE ON CHRONIC DIALYSIS (HCC): ICD-10-CM

## 2023-09-05 DIAGNOSIS — N18.6 STAGE 5 CHRONIC KIDNEY DISEASE ON CHRONIC DIALYSIS (HCC): ICD-10-CM

## 2023-09-05 LAB
ALBUMIN SERPL-MCNC: 3.6 G/DL (ref 3.4–5)
ALBUMIN/GLOB SERPL: 0.9 (ref 0.8–1.7)
ALP SERPL-CCNC: 134 U/L (ref 45–117)
ALT SERPL-CCNC: 30 U/L (ref 13–56)
ANION GAP SERPL CALC-SCNC: 10 MMOL/L (ref 3–18)
AST SERPL W P-5'-P-CCNC: 27 U/L (ref 10–38)
BASOPHILS # BLD: 0.1 K/UL (ref 0–0.1)
BASOPHILS NFR BLD: 1 % (ref 0–2)
BILIRUB SERPL-MCNC: 0.4 MG/DL (ref 0.2–1)
BNP SERPL-MCNC: ABNORMAL PG/ML (ref 0–900)
BUN SERPL-MCNC: 44 MG/DL (ref 7–18)
BUN/CREAT SERPL: 5 (ref 12–20)
CA-I BLD-MCNC: 8.8 MG/DL (ref 8.5–10.1)
CHLORIDE SERPL-SCNC: 105 MMOL/L (ref 100–111)
CO2 SERPL-SCNC: 24 MMOL/L (ref 21–32)
CREAT SERPL-MCNC: 8.03 MG/DL (ref 0.6–1.3)
DIFFERENTIAL METHOD BLD: ABNORMAL
EKG ATRIAL RATE: 86 BPM
EKG DIAGNOSIS: NORMAL
EKG P AXIS: 41 DEGREES
EKG P-R INTERVAL: 168 MS
EKG Q-T INTERVAL: 416 MS
EKG QRS DURATION: 80 MS
EKG QTC CALCULATION (BAZETT): 497 MS
EKG R AXIS: 41 DEGREES
EKG T AXIS: 68 DEGREES
EKG VENTRICULAR RATE: 86 BPM
EOSINOPHIL # BLD: 0.4 K/UL (ref 0–0.4)
EOSINOPHIL NFR BLD: 5 % (ref 0–5)
ERYTHROCYTE [DISTWIDTH] IN BLOOD BY AUTOMATED COUNT: 16.3 % (ref 11.6–14.5)
GLOBULIN SER CALC-MCNC: 3.9 G/DL (ref 2–4)
GLUCOSE SERPL-MCNC: 108 MG/DL (ref 74–99)
HCT VFR BLD AUTO: 31.6 % (ref 35–45)
HGB BLD-MCNC: 10.3 G/DL (ref 12–16)
IMM GRANULOCYTES # BLD AUTO: 0 K/UL (ref 0–0.04)
IMM GRANULOCYTES NFR BLD AUTO: 0 % (ref 0–0.5)
LYMPHOCYTES # BLD: 2.3 K/UL (ref 0.9–3.6)
LYMPHOCYTES NFR BLD: 30 % (ref 21–52)
MCH RBC QN AUTO: 33.3 PG (ref 24–34)
MCHC RBC AUTO-ENTMCNC: 32.6 G/DL (ref 31–37)
MCV RBC AUTO: 102.3 FL (ref 78–100)
MONOCYTES # BLD: 0.7 K/UL (ref 0.05–1.2)
MONOCYTES NFR BLD: 10 % (ref 3–10)
NEUTS SEG # BLD: 4.2 K/UL (ref 1.8–8)
NEUTS SEG NFR BLD: 54 % (ref 40–73)
NRBC # BLD: 0 K/UL (ref 0–0.01)
NRBC BLD-RTO: 0 PER 100 WBC
PLATELET # BLD AUTO: 211 K/UL (ref 135–420)
PMV BLD AUTO: 10.3 FL (ref 9.2–11.8)
POTASSIUM SERPL-SCNC: 3.8 MMOL/L (ref 3.5–5.5)
PROT SERPL-MCNC: 7.5 G/DL (ref 6.4–8.2)
RBC # BLD AUTO: 3.09 M/UL (ref 4.2–5.3)
SODIUM SERPL-SCNC: 139 MMOL/L (ref 136–145)
TROPONIN I SERPL HS-MCNC: 32 NG/L (ref 0–54)
WBC # BLD AUTO: 7.6 K/UL (ref 4.6–13.2)

## 2023-09-05 PROCEDURE — 80053 COMPREHEN METABOLIC PANEL: CPT

## 2023-09-05 PROCEDURE — 71045 X-RAY EXAM CHEST 1 VIEW: CPT

## 2023-09-05 PROCEDURE — 85025 COMPLETE CBC W/AUTO DIFF WBC: CPT

## 2023-09-05 PROCEDURE — 93005 ELECTROCARDIOGRAM TRACING: CPT | Performed by: EMERGENCY MEDICINE

## 2023-09-05 PROCEDURE — 99285 EMERGENCY DEPT VISIT HI MDM: CPT

## 2023-09-05 PROCEDURE — 83880 ASSAY OF NATRIURETIC PEPTIDE: CPT

## 2023-09-05 PROCEDURE — 84484 ASSAY OF TROPONIN QUANT: CPT

## 2023-09-05 ASSESSMENT — ENCOUNTER SYMPTOMS
SHORTNESS OF BREATH: 1
GASTROINTESTINAL NEGATIVE: 1

## 2023-09-05 ASSESSMENT — PAIN - FUNCTIONAL ASSESSMENT: PAIN_FUNCTIONAL_ASSESSMENT: NONE - DENIES PAIN

## 2023-09-05 NOTE — ED TRIAGE NOTES
Pt was at [address] when she became SOB. Pt normally go for dialyzes for 2hr and 45min, pt was only able to dialyzed for 30 minutes today. [facility] stop the treatment and sent pt to the ED.

## 2023-09-05 NOTE — ED PROVIDER NOTES
Max Daily Amount: 100 mg     hydrALAZINE 25 MG tablet  Commonly known as: APRESOLINE  Take 1 tablet by mouth 3 times daily as needed (hypertension >160/100)     levothyroxine 75 MCG tablet  Commonly known as: SYNTHROID     meclizine 25 MG tablet  Commonly known as: ANTIVERT     mirtazapine 15 MG tablet  Commonly known as: REMERON  Take 1 tablet by mouth nightly     montelukast 10 MG tablet  Commonly known as: SINGULAIR     NIFEdipine 90 MG extended release tablet  Commonly known as: ADALAT CC     polyethylene glycol 17 GM/SCOOP powder  Commonly known as: GLYCOLAX     predniSONE 5 MG tablet  Commonly known as: DELTASONE  Take 1 tablet by mouth daily     promethazine 12.5 MG tablet  Commonly known as: PHENERGAN     RenaPlex-D Tabs     sevelamer 800 MG tablet  Commonly known as: RENVELA     simvastatin 20 MG tablet  Commonly known as: ZOCOR     Trelegy Ellipta 100-62.5-25 MCG/ACT Aepb inhaler  Generic drug: fluticasone-umeclidin-vilant  INHALE 1 PUFF EVERY DAY           * This list has 2 medication(s) that are the same as other medications prescribed for you. Read the directions carefully, and ask your doctor or other care provider to review them with you. DISCONTINUED MEDICATIONS:  Discharge Medication List as of 9/5/2023  5:42 PM          I am the Primary Clinician of Record. Jeanne Vincent MD (electronically signed)    (Please note that parts of this dictation were completed with voice recognition software. Quite often unanticipated grammatical, syntax, homophones, and other interpretive errors are inadvertently transcribed by the computer software. Please disregards these errors.  Please excuse any errors that have escaped final proofreading.)     Ml Mariee MD  09/05/23 [address] [facility] Saúl Magaña MD  09/09/23 [id number]

## 2023-09-05 NOTE — ED NOTES
Magalie Giraldo from the OR attempted to start a IV at pt bedside. Kings Londono was able to draw labs but a IV line was unsuccessful. IV access was delayed due to pt being a difficult stick. MD was made aware.       Crystal Limon RN  09/05/23 [address] [place] Pagan RN  09/05/23 5828

## 2023-09-08 ENCOUNTER — CARE COORDINATION (OUTPATIENT)
Facility: CLINIC | Age: 68
End: 2023-09-08

## 2023-09-08 ASSESSMENT — PATIENT HEALTH QUESTIONNAIRE - PHQ9
SUM OF ALL RESPONSES TO PHQ QUESTIONS 1-9: 1

## 2023-09-08 NOTE — CARE COORDINATION
Ambulatory Care Coordination Note  2023    Patient Current Location:  Home: [address] [address]    ACM contacted the patient by telephone. Verified name and  with patient as identifiers. Provided introduction to self, and explanation of the ACM role. ACM: Gallo Solomon RN    Challenges to be reviewed by the provider   Additional needs identified to be addressed with provider: No  none               Method of communication with provider: phone.                      ---------------------------------------------------------   Encounter Note:     Patient enrolled in Complex Case Management effective 2023 and will be followed per [facility] protocol. Initial questions answered with subsequent encounters planned. Chief Complaint of dyspnea with exertion, Patient states is has improved somewhat - but states current temp and humidity is not helping. No wheezing or productive cough reported. Further / follow up appointments listed below - reviewed upcoming appointments  Further questions answered as needed and patient has ACM contact information  Depression screen done - PHQ2 score = 0  Respiratory and cardiac status shows no issues at present  Preliminary review of medications done during this encounter - will do full med rec on next encounter                    ---------------------------------------------------------     Offered patient enrollment in the Remote Patient Monitoring (RPM) program for in-home monitoring: NA. Ambulatory Care Coordination Assessment    Care Coordination Protocol  Week 1 - Initial Assessment     Do you have all of your prescriptions and are they filled?: Yes  Barriers to medication adherence: None  Are you able to afford your medications?: Yes  How often do you have trouble taking your medications the way you have been told to take them?: I always take them as prescribed.         Ability to seek help/take action for Emergent Urgent situations i.e. fire, crime, inclement

## 2023-09-09 ASSESSMENT — ENCOUNTER SYMPTOMS
GASTROINTESTINAL NEGATIVE: 1
SHORTNESS OF BREATH: 1

## 2023-09-13 ENCOUNTER — OFFICE VISIT (OUTPATIENT)
Facility: CLINIC | Age: 68
End: 2023-09-13
Payer: MEDICARE

## 2023-09-13 VITALS
HEIGHT: 61 IN | BODY MASS INDEX: 28.6 KG/M2 | WEIGHT: 151.5 LBS | OXYGEN SATURATION: 100 % | HEART RATE: 96 BPM | TEMPERATURE: 97.6 F | DIASTOLIC BLOOD PRESSURE: 67 MMHG | SYSTOLIC BLOOD PRESSURE: 143 MMHG

## 2023-09-13 DIAGNOSIS — N63.42 SUBAREOLAR MASS OF LEFT BREAST: ICD-10-CM

## 2023-09-13 DIAGNOSIS — R06.00 DYSPNEA, UNSPECIFIED TYPE: Primary | ICD-10-CM

## 2023-09-13 DIAGNOSIS — E03.9 HYPOTHYROIDISM, UNSPECIFIED TYPE: ICD-10-CM

## 2023-09-13 PROCEDURE — 99214 OFFICE O/P EST MOD 30 MIN: CPT | Performed by: STUDENT IN AN ORGANIZED HEALTH CARE EDUCATION/TRAINING PROGRAM

## 2023-09-13 PROCEDURE — G8399 PT W/DXA RESULTS DOCUMENT: HCPCS | Performed by: STUDENT IN AN ORGANIZED HEALTH CARE EDUCATION/TRAINING PROGRAM

## 2023-09-13 PROCEDURE — 1036F TOBACCO NON-USER: CPT | Performed by: STUDENT IN AN ORGANIZED HEALTH CARE EDUCATION/TRAINING PROGRAM

## 2023-09-13 PROCEDURE — 3017F COLORECTAL CA SCREEN DOC REV: CPT | Performed by: STUDENT IN AN ORGANIZED HEALTH CARE EDUCATION/TRAINING PROGRAM

## 2023-09-13 PROCEDURE — 1090F PRES/ABSN URINE INCON ASSESS: CPT | Performed by: STUDENT IN AN ORGANIZED HEALTH CARE EDUCATION/TRAINING PROGRAM

## 2023-09-13 PROCEDURE — 3077F SYST BP >= 140 MM HG: CPT | Performed by: STUDENT IN AN ORGANIZED HEALTH CARE EDUCATION/TRAINING PROGRAM

## 2023-09-13 PROCEDURE — 1123F ACP DISCUSS/DSCN MKR DOCD: CPT | Performed by: STUDENT IN AN ORGANIZED HEALTH CARE EDUCATION/TRAINING PROGRAM

## 2023-09-13 PROCEDURE — G8428 CUR MEDS NOT DOCUMENT: HCPCS | Performed by: STUDENT IN AN ORGANIZED HEALTH CARE EDUCATION/TRAINING PROGRAM

## 2023-09-13 PROCEDURE — 3078F DIAST BP <80 MM HG: CPT | Performed by: STUDENT IN AN ORGANIZED HEALTH CARE EDUCATION/TRAINING PROGRAM

## 2023-09-13 PROCEDURE — G8417 CALC BMI ABV UP PARAM F/U: HCPCS | Performed by: STUDENT IN AN ORGANIZED HEALTH CARE EDUCATION/TRAINING PROGRAM

## 2023-09-13 ASSESSMENT — PATIENT HEALTH QUESTIONNAIRE - PHQ9
5. POOR APPETITE OR OVEREATING: 0
SUM OF ALL RESPONSES TO PHQ QUESTIONS 1-9: 4
9. THOUGHTS THAT YOU WOULD BE BETTER OFF DEAD, OR OF HURTING YOURSELF: 0
SUM OF ALL RESPONSES TO PHQ QUESTIONS 1-9: 4
SUM OF ALL RESPONSES TO PHQ QUESTIONS 1-9: 4
7. TROUBLE CONCENTRATING ON THINGS, SUCH AS READING THE NEWSPAPER OR WATCHING TELEVISION: 0
3. TROUBLE FALLING OR STAYING ASLEEP: 0
1. LITTLE INTEREST OR PLEASURE IN DOING THINGS: 1
8. MOVING OR SPEAKING SO SLOWLY THAT OTHER PEOPLE COULD HAVE NOTICED. OR THE OPPOSITE, BEING SO FIGETY OR RESTLESS THAT YOU HAVE BEEN MOVING AROUND A LOT MORE THAN USUAL: 1
2. FEELING DOWN, DEPRESSED OR HOPELESS: 1
6. FEELING BAD ABOUT YOURSELF - OR THAT YOU ARE A FAILURE OR HAVE LET YOURSELF OR YOUR FAMILY DOWN: 0
SUM OF ALL RESPONSES TO PHQ QUESTIONS 1-9: 4
10. IF YOU CHECKED OFF ANY PROBLEMS, HOW DIFFICULT HAVE THESE PROBLEMS MADE IT FOR YOU TO DO YOUR WORK, TAKE CARE OF THINGS AT HOME, OR GET ALONG WITH OTHER PEOPLE: 0
4. FEELING TIRED OR HAVING LITTLE ENERGY: 1
SUM OF ALL RESPONSES TO PHQ9 QUESTIONS 1 & 2: 2

## 2023-09-13 NOTE — PROGRESS NOTES
[facility] presents today for   Chief Complaint   Patient presents with    ED Follow-up     SOB         Is someone accompanying this pt? No   Is the patient using any DME equipment during OV? No     Health Maintenance reviewed and discussed and ordered per Provider. Health Maintenance Due   Topic Date Due    Hepatitis C screen  Never done    DTaP/Tdap/Td vaccine (1 - Tdap) Never done    Shingles vaccine (2 of 2) 04/04/2023    COVID-19 Vaccine (5 - Pfizer risk series) 04/04/2023    Flu vaccine (1) 08/01/2023   . Coordination of Care:  1. \"Have you been to the ER, urgent care clinic since your last visit? Hospitalized since your last visit? \" ER for SOB    2. \"Have you seen or consulted any other health care providers outside of the [address] [place] [address] since your last visit? \" No    3. For patients aged 43-73: Has the patient had a colonoscopy? Yes- No care gap present    If the patient is female:    4. For patients aged 43-66: Has the patient had a mammogram within the past 2 years? Ordered     5. For patients aged 21-65: Has the patient had a pap smear?  N/A based on age/sex

## 2023-09-15 ENCOUNTER — CARE COORDINATION (OUTPATIENT)
Facility: CLINIC | Age: 68
End: 2023-09-15

## 2023-09-18 RX ORDER — FLUTICASONE FUROATE, UMECLIDINIUM BROMIDE AND VILANTEROL TRIFENATATE 100; 62.5; 25 UG/1; UG/1; UG/1
POWDER RESPIRATORY (INHALATION)
Qty: 180 EACH | Refills: 0 | Status: SHIPPED | OUTPATIENT
Start: 2023-09-18

## 2023-09-22 ENCOUNTER — CARE COORDINATION (OUTPATIENT)
Facility: CLINIC | Age: 68
End: 2023-09-22

## 2023-09-25 RX ORDER — LEVOTHYROXINE SODIUM 0.07 MG/1
TABLET ORAL
Qty: 90 TABLET | Refills: 3 | Status: SHIPPED | OUTPATIENT
Start: 2023-09-25

## 2023-09-25 RX ORDER — LOSARTAN POTASSIUM 50 MG/1
50 TABLET ORAL EVERY MORNING
Qty: 90 TABLET | Refills: 3 | Status: SHIPPED | OUTPATIENT
Start: 2023-09-25

## 2023-09-25 RX ORDER — VALGANCICLOVIR 450 MG/1
TABLET, FILM COATED ORAL
Qty: 180 TABLET | Refills: 3 | OUTPATIENT
Start: 2023-09-25

## 2023-09-25 RX ORDER — APIXABAN 2.5 MG/1
2.5 TABLET, FILM COATED ORAL 2 TIMES DAILY
Qty: 180 TABLET | Refills: 3 | Status: SHIPPED | OUTPATIENT
Start: 2023-09-25

## 2023-09-25 RX ORDER — CARVEDILOL 25 MG/1
25 TABLET ORAL 2 TIMES DAILY WITH MEALS
Qty: 180 TABLET | Refills: 3 | OUTPATIENT
Start: 2023-09-25

## 2023-09-25 RX ORDER — SUCRALFATE 1 G/1
TABLET ORAL
Qty: 360 TABLET | Refills: 3 | Status: SHIPPED | OUTPATIENT
Start: 2023-09-25

## 2023-09-25 RX ORDER — AMIODARONE HYDROCHLORIDE 200 MG/1
200 TABLET ORAL EVERY MORNING
Qty: 90 TABLET | Refills: 3 | Status: SHIPPED | OUTPATIENT
Start: 2023-09-25

## 2023-09-25 RX ORDER — DICYCLOMINE HYDROCHLORIDE 10 MG/1
CAPSULE ORAL
Qty: 90 CAPSULE | Refills: 3 | Status: SHIPPED | OUTPATIENT
Start: 2023-09-25

## 2023-09-25 RX ORDER — MONTELUKAST SODIUM 10 MG/1
10 TABLET ORAL EVERY MORNING
Qty: 90 TABLET | Refills: 3 | Status: SHIPPED | OUTPATIENT
Start: 2023-09-25

## 2023-09-25 RX ORDER — CLOPIDOGREL BISULFATE 75 MG/1
75 TABLET ORAL EVERY MORNING
Qty: 90 TABLET | Refills: 3 | Status: SHIPPED | OUTPATIENT
Start: 2023-09-25

## 2023-09-25 RX ORDER — SIMVASTATIN 20 MG
TABLET ORAL
Qty: 90 TABLET | Refills: 3 | Status: SHIPPED | OUTPATIENT
Start: 2023-09-25

## 2023-09-27 ENCOUNTER — HOSPITAL ENCOUNTER (OUTPATIENT)
Facility: HOSPITAL | Age: 68
Discharge: HOME | End: 2023-09-30
Attending: STUDENT IN AN ORGANIZED HEALTH CARE EDUCATION/TRAINING PROGRAM
Payer: MEDICARE

## 2023-09-27 ENCOUNTER — TELEPHONE (OUTPATIENT)
Facility: CLINIC | Age: 68
End: 2023-09-27

## 2023-09-27 VITALS — WEIGHT: 151 LBS | HEIGHT: 61 IN | BODY MASS INDEX: 28.51 KG/M2

## 2023-09-27 DIAGNOSIS — R92.8 ABNORMAL MAMMOGRAM: ICD-10-CM

## 2023-09-27 DIAGNOSIS — N63.42 SUBAREOLAR MASS OF LEFT BREAST: Primary | ICD-10-CM

## 2023-09-27 DIAGNOSIS — N63.42 SUBAREOLAR MASS OF LEFT BREAST: ICD-10-CM

## 2023-09-27 PROCEDURE — 76642 ULTRASOUND BREAST LIMITED: CPT

## 2023-09-27 PROCEDURE — 77066 DX MAMMO INCL CAD BI: CPT

## 2023-09-27 NOTE — TELEPHONE ENCOUNTER
----- Message from Rose Mary Britton MD sent at 9/27/2023  2:59 PM EDT -----  Mammogram does show a mass and recommendation is to biopsy it. Will refer to breast surgeon to get this done.

## 2023-09-27 NOTE — PROGRESS NOTES
Mammogram shows suspicious mass at the left nipple recommended referral to breast surgeon for biopsy. BI-RADS 4.   Will refer

## 2023-09-28 ENCOUNTER — TELEPHONE (OUTPATIENT)
Facility: CLINIC | Age: 68
End: 2023-09-28

## 2023-09-28 NOTE — TELEPHONE ENCOUNTER
Patient called stating she was going to get her next coved shot tomorrow 9/29/23. But she wonted to know if it was safe for her to get it because she is allergic to a lot of different things.    Call back number is [phone]

## 2023-09-29 ENCOUNTER — HOSPITAL ENCOUNTER (OUTPATIENT)
Age: 68
Discharge: HOME | End: 2023-10-02

## 2023-09-29 ENCOUNTER — CARE COORDINATION (OUTPATIENT)
Facility: CLINIC | Age: 68
End: 2023-09-29

## 2023-09-29 LAB — LABCORP DRAW FEE: NORMAL

## 2023-09-29 RX ORDER — DEXLANSOPRAZOLE 60 MG/1
60 CAPSULE, DELAYED RELEASE ORAL EVERY MORNING
Qty: 90 CAPSULE | Refills: 2 | Status: SHIPPED | OUTPATIENT
Start: 2023-09-29

## 2023-09-29 ASSESSMENT — PATIENT HEALTH QUESTIONNAIRE - PHQ9
SUM OF ALL RESPONSES TO PHQ QUESTIONS 1-9: 0
SUM OF ALL RESPONSES TO PHQ QUESTIONS 1-9: 0
1. LITTLE INTEREST OR PLEASURE IN DOING THINGS: 0
SUM OF ALL RESPONSES TO PHQ9 QUESTIONS 1 & 2: 0
2. FEELING DOWN, DEPRESSED OR HOPELESS: 0
SUM OF ALL RESPONSES TO PHQ QUESTIONS 1-9: 0
SUM OF ALL RESPONSES TO PHQ QUESTIONS 1-9: 0

## 2023-09-29 NOTE — TELEPHONE ENCOUNTER
I spoke with patient's daughter and they are aware that she can get the vaccine. Dr. Dianne Polanco recommended to confirm with pharmacist as well. Daughter expressed understanding.

## 2023-09-29 NOTE — CARE COORDINATION
Ambulatory Care Coordination Note  2023    Patient Current Location:  Home: [address]    ACM contacted the patient by telephone. Verified name and  with patient as identifiers. Provided introduction to self, and explanation of the ACM role. ACM: Annemarie Benz RN    Challenges to be reviewed by the provider   Additional needs identified to be addressed with provider: No  none               Method of communication with provider: phone.                      ---------------------------------------------------------   Encounter Note:     Spoke with patient - Patient states doing well at present   [address] Dialysis well. Further / follow up appointments listed below - reviewed upcoming appointments  Further questions answered as needed and patient has ACM contact information  Depression screen done - PHQ2 score = 0  Respiratory and cardiac status shows no issues at present  Medication reconciliation done at this encounter with patient. Shows understanding of medication therapy    HTN Teaching        Always take medication as prescribed . Do not skip or stop medication unless specifically instructed to by MD or PCP. If you forget to take a dose, take it as soon as you remember. However, if it is almost time for your next dose, skip the missed dose and go back to your original schedule. Discussed symptoms of HTN - low sodium diet                    ---------------------------------------------------------     Offered patient enrollment in the Remote Patient Monitoring (RPM) program for in-home monitoring: NA.     Ambulatory Care Coordination Assessment    Care Coordination Protocol  Referral from Primary Care Provider: No  Week 1 - Initial Assessment     Do you have all of your prescriptions and are they filled?: Yes  Barriers to medication adherence: None  Are you able to afford your medications?: Yes  How often do you have trouble taking your medications the way you have been told to

## 2023-09-30 LAB — TSH SERPL DL<=0.005 MIU/L-ACNC: 1.24 UIU/ML (ref 0.45–4.5)

## 2023-10-02 ENCOUNTER — TELEPHONE (OUTPATIENT)
Facility: CLINIC | Age: 68
End: 2023-10-02

## 2023-10-02 NOTE — TELEPHONE ENCOUNTER
----- Message from Nisha Enamorado MD sent at 10/2/2023  7:29 AM EDT -----  Thyroid normal  ----- Message -----  From: Lupis Gupta Incoming Amb Ref Lab Orders To [facility]  Sent: 9/30/2023   8:36 AM EDT  To: Nisha Enamorado MD    Called patient to speak about message from Yue Barlow MD. Confirmed name and date of birth. Spoke with patient about message. Patient expressed understanding.

## 2023-10-06 ENCOUNTER — CARE COORDINATION (OUTPATIENT)
Facility: CLINIC | Age: 68
End: 2023-10-06

## 2023-10-06 NOTE — CARE COORDINATION
neighbor harassment)?: Consistently safe, supportive, stable, no identified problems   How do daily activities impact on the patient's well-being? (include current or anticipated unemployment, work, caregiving, access to transportation or other): Some general dissatisfaction but no concern   How wells does the patient now understand their health and well-being (symptoms, signs or risk factors) and what they need to do to manage their health?: Reasonable to good understanding but do not feel able to engage with advice at this time   How well do you think your patient can engage in healthcare discussions? (Barriers include language, deafness, aphasia, alcohol or drug problems, learning difficulties, concentration): Adequate communication, with or without minor barriers   Do other services need to be involved to help this patient?: Other care/services not required at this time   Are current services involved with this patient well-coordinated? (Include coordination with other services you are now recommendation):  All required care/services in place and well-coordinated   Suggested Interventions and Community Resources                  Future Appointments   Date Time Provider [address]   10/11/2023  8:00 AM Billie Benavides DO [facility] BS AMB   10/18/2023  1:30 PM Kaela Stout MD [facility] BS AMB

## 2023-10-09 RX ORDER — DEXLANSOPRAZOLE 60 MG/1
60 CAPSULE, DELAYED RELEASE ORAL EVERY MORNING
Qty: 90 CAPSULE | Refills: 2 | Status: SHIPPED | OUTPATIENT
Start: 2023-10-09

## 2023-10-09 RX ORDER — DEXLANSOPRAZOLE 60 MG/1
60 CAPSULE, DELAYED RELEASE ORAL EVERY MORNING
Qty: 90 CAPSULE | Refills: 2 | OUTPATIENT
Start: 2023-10-09

## 2023-10-11 ENCOUNTER — OFFICE VISIT (OUTPATIENT)
Age: 68
End: 2023-10-11
Payer: MEDICARE

## 2023-10-11 VITALS
WEIGHT: 151 LBS | SYSTOLIC BLOOD PRESSURE: 136 MMHG | HEART RATE: 73 BPM | HEIGHT: 61 IN | BODY MASS INDEX: 28.51 KG/M2 | OXYGEN SATURATION: 98 % | DIASTOLIC BLOOD PRESSURE: 60 MMHG | TEMPERATURE: 98 F

## 2023-10-11 DIAGNOSIS — Z79.01 CHRONIC ANTICOAGULATION: ICD-10-CM

## 2023-10-11 DIAGNOSIS — N63.20 MASS OF LEFT BREAST, UNSPECIFIED QUADRANT: Primary | ICD-10-CM

## 2023-10-11 PROCEDURE — G8399 PT W/DXA RESULTS DOCUMENT: HCPCS | Performed by: SURGERY

## 2023-10-11 PROCEDURE — 3075F SYST BP GE 130 - 139MM HG: CPT | Performed by: SURGERY

## 2023-10-11 PROCEDURE — 3017F COLORECTAL CA SCREEN DOC REV: CPT | Performed by: SURGERY

## 2023-10-11 PROCEDURE — 1090F PRES/ABSN URINE INCON ASSESS: CPT | Performed by: SURGERY

## 2023-10-11 PROCEDURE — 1123F ACP DISCUSS/DSCN MKR DOCD: CPT | Performed by: SURGERY

## 2023-10-11 PROCEDURE — G8417 CALC BMI ABV UP PARAM F/U: HCPCS | Performed by: SURGERY

## 2023-10-11 PROCEDURE — 3078F DIAST BP <80 MM HG: CPT | Performed by: SURGERY

## 2023-10-11 PROCEDURE — G8427 DOCREV CUR MEDS BY ELIG CLIN: HCPCS | Performed by: SURGERY

## 2023-10-11 PROCEDURE — 1036F TOBACCO NON-USER: CPT | Performed by: SURGERY

## 2023-10-11 PROCEDURE — G8484 FLU IMMUNIZE NO ADMIN: HCPCS | Performed by: SURGERY

## 2023-10-11 PROCEDURE — 99204 OFFICE O/P NEW MOD 45 MIN: CPT | Performed by: SURGERY

## 2023-10-11 ASSESSMENT — ENCOUNTER SYMPTOMS
CHEST TIGHTNESS: 0
SHORTNESS OF BREATH: 0

## 2023-10-11 NOTE — PROGRESS NOTES
Nettie Bah is a 79 y.o. female (: 1955) presenting to address:    Chief Complaint   Patient presents with    New Patient     Left nipple mass/Birads 4       Medication list and allergies have been reviewed with Nettie Bah and updated as of today's date. I have gone over all Medical, Surgical and Social History with Nettie Bah and updated/added the information accordingly.

## 2023-10-11 NOTE — PROGRESS NOTES
CC:   Chief Complaint   Patient presents with    New Patient     Left nipple mass/Birads 4        Assessment:    ICD-10-CM    1. Mass of left breast, unspecified quadrant  N63.20       2. Chronic anticoagulation  Z79.01           Plan: This can be removed in the clinic with local anesthesia. Recommend she hold her Eliquis for 24 hours prior to the office procedure and we will send for pathology. She understands that this is possible benign such as sebaceous cyst or could be malignant cells and without biopsy it is difficult to determine what the mass is. She agrees with this plan and will schedule a procedure appointment. The risks and benefits including need for repeat procedure and bleeding were reviewed. HPI:  Naila Lin is a 79 y.o. female who is referred by Lauren Haywood MD for mass of the left nipple. She has never had abnormal mammograms in the past.  She reports no changes to self breast exam, pain or nipple discharge. There is no family history of breast, colon, pancreatic or ovarian cancer. She states that she takes Eliquis as well as Plavix chronically. She underwent diagnostic imaging of the left breast and a 1.2 x 0.8 x 1.3 cm mass was noted in the nipple BI-RADS 4 not amendable to biopsy. Allergies:   Allergies   Allergen Reactions    Valacyclovir      Stroke like symptoms   Other reaction(s): Lost Consciousness    Aspirin Rash     Other reaction(s): Unknown (comments)    Bromfenac Rash     Other reaction(s): Unknown (comments)    Cefepime Other (See Comments)     Neurological reaction    Ceftriaxone Rash    Copper Rash    Ibuprofen Rash     Other reaction(s): Unknown (comments)    Ketorolac Tromethamine Rash     Other reaction(s): Unknown (comments)    Morphine      Other reaction(s): rash/itching, Unknown    Nabumetone Rash     Other reaction(s): Unknown (comments)    Rifampin Rash     Other reaction(s): Unknown (comments)    Sulfamethoxazole-Trimethoprim Rash     Other Yes

## 2023-10-18 ENCOUNTER — OFFICE VISIT (OUTPATIENT)
Facility: CLINIC | Age: 68
End: 2023-10-18
Payer: MEDICARE

## 2023-10-18 VITALS
HEIGHT: 61 IN | DIASTOLIC BLOOD PRESSURE: 73 MMHG | TEMPERATURE: 97.9 F | WEIGHT: 150.6 LBS | HEART RATE: 80 BPM | OXYGEN SATURATION: 100 % | BODY MASS INDEX: 28.43 KG/M2 | SYSTOLIC BLOOD PRESSURE: 144 MMHG

## 2023-10-18 DIAGNOSIS — M79.10 MYALGIA: Primary | ICD-10-CM

## 2023-10-18 PROCEDURE — 1036F TOBACCO NON-USER: CPT | Performed by: STUDENT IN AN ORGANIZED HEALTH CARE EDUCATION/TRAINING PROGRAM

## 2023-10-18 PROCEDURE — 3078F DIAST BP <80 MM HG: CPT | Performed by: STUDENT IN AN ORGANIZED HEALTH CARE EDUCATION/TRAINING PROGRAM

## 2023-10-18 PROCEDURE — 1123F ACP DISCUSS/DSCN MKR DOCD: CPT | Performed by: STUDENT IN AN ORGANIZED HEALTH CARE EDUCATION/TRAINING PROGRAM

## 2023-10-18 PROCEDURE — G8484 FLU IMMUNIZE NO ADMIN: HCPCS | Performed by: STUDENT IN AN ORGANIZED HEALTH CARE EDUCATION/TRAINING PROGRAM

## 2023-10-18 PROCEDURE — G8417 CALC BMI ABV UP PARAM F/U: HCPCS | Performed by: STUDENT IN AN ORGANIZED HEALTH CARE EDUCATION/TRAINING PROGRAM

## 2023-10-18 PROCEDURE — 3017F COLORECTAL CA SCREEN DOC REV: CPT | Performed by: STUDENT IN AN ORGANIZED HEALTH CARE EDUCATION/TRAINING PROGRAM

## 2023-10-18 PROCEDURE — 1090F PRES/ABSN URINE INCON ASSESS: CPT | Performed by: STUDENT IN AN ORGANIZED HEALTH CARE EDUCATION/TRAINING PROGRAM

## 2023-10-18 PROCEDURE — 3077F SYST BP >= 140 MM HG: CPT | Performed by: STUDENT IN AN ORGANIZED HEALTH CARE EDUCATION/TRAINING PROGRAM

## 2023-10-18 PROCEDURE — 99213 OFFICE O/P EST LOW 20 MIN: CPT | Performed by: STUDENT IN AN ORGANIZED HEALTH CARE EDUCATION/TRAINING PROGRAM

## 2023-10-18 PROCEDURE — G8428 CUR MEDS NOT DOCUMENT: HCPCS | Performed by: STUDENT IN AN ORGANIZED HEALTH CARE EDUCATION/TRAINING PROGRAM

## 2023-10-18 PROCEDURE — G8399 PT W/DXA RESULTS DOCUMENT: HCPCS | Performed by: STUDENT IN AN ORGANIZED HEALTH CARE EDUCATION/TRAINING PROGRAM

## 2023-10-18 RX ORDER — ASPIRIN 81 MG
1 TABLET,CHEWABLE ORAL NIGHTLY
Qty: 60 G | Refills: 1 | Status: SHIPPED | OUTPATIENT
Start: 2023-10-18

## 2023-10-18 ASSESSMENT — PATIENT HEALTH QUESTIONNAIRE - PHQ9
SUM OF ALL RESPONSES TO PHQ9 QUESTIONS 1 & 2: 1
9. THOUGHTS THAT YOU WOULD BE BETTER OFF DEAD, OR OF HURTING YOURSELF: 0
8. MOVING OR SPEAKING SO SLOWLY THAT OTHER PEOPLE COULD HAVE NOTICED. OR THE OPPOSITE, BEING SO FIGETY OR RESTLESS THAT YOU HAVE BEEN MOVING AROUND A LOT MORE THAN USUAL: 0
6. FEELING BAD ABOUT YOURSELF - OR THAT YOU ARE A FAILURE OR HAVE LET YOURSELF OR YOUR FAMILY DOWN: 0
SUM OF ALL RESPONSES TO PHQ QUESTIONS 1-9: 1
SUM OF ALL RESPONSES TO PHQ QUESTIONS 1-9: 1
10. IF YOU CHECKED OFF ANY PROBLEMS, HOW DIFFICULT HAVE THESE PROBLEMS MADE IT FOR YOU TO DO YOUR WORK, TAKE CARE OF THINGS AT HOME, OR GET ALONG WITH OTHER PEOPLE: 0
1. LITTLE INTEREST OR PLEASURE IN DOING THINGS: 0
4. FEELING TIRED OR HAVING LITTLE ENERGY: 0
SUM OF ALL RESPONSES TO PHQ QUESTIONS 1-9: 1
2. FEELING DOWN, DEPRESSED OR HOPELESS: 1
5. POOR APPETITE OR OVEREATING: 0
3. TROUBLE FALLING OR STAYING ASLEEP: 0
7. TROUBLE CONCENTRATING ON THINGS, SUCH AS READING THE NEWSPAPER OR WATCHING TELEVISION: 0
SUM OF ALL RESPONSES TO PHQ QUESTIONS 1-9: 1

## 2023-10-18 ASSESSMENT — COLUMBIA-SUICIDE SEVERITY RATING SCALE - C-SSRS
4. HAVE YOU HAD THESE THOUGHTS AND HAD SOME INTENTION OF ACTING ON THEM?: NO
7. DID THIS OCCUR IN THE LAST THREE MONTHS: NO
5. HAVE YOU STARTED TO WORK OUT OR WORKED OUT THE DETAILS OF HOW TO KILL YOURSELF? DO YOU INTEND TO CARRY OUT THIS PLAN?: NO
3. HAVE YOU BEEN THINKING ABOUT HOW YOU MIGHT KILL YOURSELF?: NO

## 2023-10-20 ENCOUNTER — CARE COORDINATION (OUTPATIENT)
Facility: CLINIC | Age: 68
End: 2023-10-20

## 2023-10-20 RX ORDER — CARVEDILOL 6.25 MG/1
TABLET ORAL
Qty: 60 TABLET | Refills: 3 | Status: SHIPPED | OUTPATIENT
Start: 2023-10-20

## 2023-10-25 ENCOUNTER — PROCEDURE VISIT (OUTPATIENT)
Age: 68
End: 2023-10-25

## 2023-10-25 ENCOUNTER — TELEPHONE (OUTPATIENT)
Facility: CLINIC | Age: 68
End: 2023-10-25

## 2023-10-25 VITALS
HEIGHT: 61 IN | TEMPERATURE: 97.3 F | SYSTOLIC BLOOD PRESSURE: 155 MMHG | RESPIRATION RATE: 14 BRPM | HEART RATE: 87 BPM | BODY MASS INDEX: 28.92 KG/M2 | OXYGEN SATURATION: 100 % | WEIGHT: 153.2 LBS | DIASTOLIC BLOOD PRESSURE: 78 MMHG

## 2023-10-25 DIAGNOSIS — N63.20 MASS OF LEFT BREAST, UNSPECIFIED QUADRANT: Primary | ICD-10-CM

## 2023-10-25 DIAGNOSIS — Z79.01 CHRONIC ANTICOAGULATION: ICD-10-CM

## 2023-10-25 RX ORDER — CARVEDILOL 6.25 MG/1
TABLET ORAL
Qty: 60 TABLET | Refills: 2 | Status: SHIPPED | OUTPATIENT
Start: 2023-10-25

## 2023-10-25 NOTE — PROGRESS NOTES
Rita Marshall is a 79 y.o. female (: 1955)     No chief complaint on file. Medication list and allergies have been reviewed with Rita Marshall and updated as of today's date. I have gone over all Medical, Surgical and Social History with Rita Marshall and updated/added the information accordingly. 1. Have you been to the ER, urgent care clinic since your last visit? Hospitalized since your last visit? No    2. Have you seen or consulted any other health care providers outside of the [address] [place] [address] since your last visit? Include any pap smears or colon screening.  No

## 2023-10-26 ENCOUNTER — TELEPHONE (OUTPATIENT)
Facility: CLINIC | Age: 68
End: 2023-10-26

## 2023-10-26 ENCOUNTER — OFFICE VISIT (OUTPATIENT)
Facility: CLINIC | Age: 68
End: 2023-10-26
Payer: MEDICARE

## 2023-10-26 VITALS
BODY MASS INDEX: 28.6 KG/M2 | TEMPERATURE: 98.2 F | HEART RATE: 77 BPM | WEIGHT: 151.5 LBS | SYSTOLIC BLOOD PRESSURE: 136 MMHG | OXYGEN SATURATION: 99 % | DIASTOLIC BLOOD PRESSURE: 71 MMHG | HEIGHT: 61 IN

## 2023-10-26 DIAGNOSIS — T14.8XXA MUSCULOSKELETAL STRAIN: Primary | ICD-10-CM

## 2023-10-26 PROCEDURE — G8484 FLU IMMUNIZE NO ADMIN: HCPCS | Performed by: STUDENT IN AN ORGANIZED HEALTH CARE EDUCATION/TRAINING PROGRAM

## 2023-10-26 PROCEDURE — 1123F ACP DISCUSS/DSCN MKR DOCD: CPT | Performed by: STUDENT IN AN ORGANIZED HEALTH CARE EDUCATION/TRAINING PROGRAM

## 2023-10-26 PROCEDURE — 3075F SYST BP GE 130 - 139MM HG: CPT | Performed by: STUDENT IN AN ORGANIZED HEALTH CARE EDUCATION/TRAINING PROGRAM

## 2023-10-26 PROCEDURE — 99214 OFFICE O/P EST MOD 30 MIN: CPT | Performed by: STUDENT IN AN ORGANIZED HEALTH CARE EDUCATION/TRAINING PROGRAM

## 2023-10-26 PROCEDURE — 1036F TOBACCO NON-USER: CPT | Performed by: STUDENT IN AN ORGANIZED HEALTH CARE EDUCATION/TRAINING PROGRAM

## 2023-10-26 PROCEDURE — G8399 PT W/DXA RESULTS DOCUMENT: HCPCS | Performed by: STUDENT IN AN ORGANIZED HEALTH CARE EDUCATION/TRAINING PROGRAM

## 2023-10-26 PROCEDURE — 3017F COLORECTAL CA SCREEN DOC REV: CPT | Performed by: STUDENT IN AN ORGANIZED HEALTH CARE EDUCATION/TRAINING PROGRAM

## 2023-10-26 PROCEDURE — 1090F PRES/ABSN URINE INCON ASSESS: CPT | Performed by: STUDENT IN AN ORGANIZED HEALTH CARE EDUCATION/TRAINING PROGRAM

## 2023-10-26 PROCEDURE — G8427 DOCREV CUR MEDS BY ELIG CLIN: HCPCS | Performed by: STUDENT IN AN ORGANIZED HEALTH CARE EDUCATION/TRAINING PROGRAM

## 2023-10-26 PROCEDURE — G8417 CALC BMI ABV UP PARAM F/U: HCPCS | Performed by: STUDENT IN AN ORGANIZED HEALTH CARE EDUCATION/TRAINING PROGRAM

## 2023-10-26 PROCEDURE — 3078F DIAST BP <80 MM HG: CPT | Performed by: STUDENT IN AN ORGANIZED HEALTH CARE EDUCATION/TRAINING PROGRAM

## 2023-10-26 RX ORDER — LIDOCAINE 4 G/G
1 PATCH TOPICAL DAILY
Qty: 30 PATCH | Refills: 0 | Status: SHIPPED | OUTPATIENT
Start: 2023-10-26 | End: 2023-11-25

## 2023-10-26 RX ORDER — BACLOFEN 10 MG/1
10 TABLET ORAL
Qty: 30 TABLET | Refills: 0 | Status: SHIPPED | OUTPATIENT
Start: 2023-10-26

## 2023-10-26 RX ORDER — DM/ACETAMINOPHEN/DOXYLAMINE 10-325/15
30 LIQUID (ML) ORAL EVERY 8 HOURS PRN
Qty: 355 ML | Refills: 0 | Status: SHIPPED | OUTPATIENT
Start: 2023-10-26

## 2023-10-26 ASSESSMENT — PATIENT HEALTH QUESTIONNAIRE - PHQ9
7. TROUBLE CONCENTRATING ON THINGS, SUCH AS READING THE NEWSPAPER OR WATCHING TELEVISION: 0
SUM OF ALL RESPONSES TO PHQ QUESTIONS 1-9: 6
2. FEELING DOWN, DEPRESSED OR HOPELESS: 1
8. MOVING OR SPEAKING SO SLOWLY THAT OTHER PEOPLE COULD HAVE NOTICED. OR THE OPPOSITE, BEING SO FIGETY OR RESTLESS THAT YOU HAVE BEEN MOVING AROUND A LOT MORE THAN USUAL: 0
SUM OF ALL RESPONSES TO PHQ QUESTIONS 1-9: 6
9. THOUGHTS THAT YOU WOULD BE BETTER OFF DEAD, OR OF HURTING YOURSELF: 0
SUM OF ALL RESPONSES TO PHQ QUESTIONS 1-9: 6
SUM OF ALL RESPONSES TO PHQ QUESTIONS 1-9: 6
5. POOR APPETITE OR OVEREATING: 1
SUM OF ALL RESPONSES TO PHQ9 QUESTIONS 1 & 2: 1
3. TROUBLE FALLING OR STAYING ASLEEP: 1
10. IF YOU CHECKED OFF ANY PROBLEMS, HOW DIFFICULT HAVE THESE PROBLEMS MADE IT FOR YOU TO DO YOUR WORK, TAKE CARE OF THINGS AT HOME, OR GET ALONG WITH OTHER PEOPLE: 0
4. FEELING TIRED OR HAVING LITTLE ENERGY: 3
1. LITTLE INTEREST OR PLEASURE IN DOING THINGS: 0
6. FEELING BAD ABOUT YOURSELF - OR THAT YOU ARE A FAILURE OR HAVE LET YOURSELF OR YOUR FAMILY DOWN: 0

## 2023-10-26 ASSESSMENT — ENCOUNTER SYMPTOMS: COLOR CHANGE: 0

## 2023-10-26 ASSESSMENT — COLUMBIA-SUICIDE SEVERITY RATING SCALE - C-SSRS
7. DID THIS OCCUR IN THE LAST THREE MONTHS: NO
5. HAVE YOU STARTED TO WORK OUT OR WORKED OUT THE DETAILS OF HOW TO KILL YOURSELF? DO YOU INTEND TO CARRY OUT THIS PLAN?: NO
4. HAVE YOU HAD THESE THOUGHTS AND HAD SOME INTENTION OF ACTING ON THEM?: NO
3. HAVE YOU BEEN THINKING ABOUT HOW YOU MIGHT KILL YOURSELF?: NO

## 2023-10-26 NOTE — PROGRESS NOTES
CC:   Chief Complaint   Patient presents with    Procedure     Left breast         Assessment:    ICD-10-CM    1. Mass of left breast, unspecified quadrant  N63.20       2. Chronic anticoagulation  Z79.01           Plan: She tolerated excision today in the clinic without complications and this was benign sebaceous cyst. She can wash/shower over the glue tomorrow and pat dry. Glue will fall off on it's own. She can use tylenol as needed for discomfort or ice packs 20 min on 20 min off. It is ok to resume eliquis in 2 days. There is no need for additional follow up unless there are questions or concerns in the future. She agrees with this plan. HPI:  Usman Peters is a 79 y.o. female who is here today for excision of Nipple mass. She states she held her eliquis as instructed and would like this removed today. She denies any changes to the mass or nipple discharge. Allergies: Allergies   Allergen Reactions    Valacyclovir      Stroke like symptoms   Other reaction(s): Lost Consciousness    Aspirin Rash     Other reaction(s): Unknown (comments)    Bromfenac Rash     Other reaction(s): Unknown (comments)    Cefepime Other (See Comments)     Neurological reaction    Ceftriaxone Rash    Copper Rash    Ibuprofen Rash     Other reaction(s): Unknown (comments)    Ketorolac Tromethamine Rash     Other reaction(s): Unknown (comments)    Morphine      Other reaction(s): rash/itching, Unknown    Nabumetone Rash     Other reaction(s): Unknown (comments)    Rifampin Rash     Other reaction(s): Unknown (comments)    Sulfamethoxazole-Trimethoprim Rash     Other reaction(s): Unknown (comments)    Vancomycin Rash     Other reaction(s): Unknown (comments)  Pt reports causes itching, takes benadryl prior to use. Pt denies anaphylaxis.     Requires benadryl with each vancomycin dose       Medication Review:  Current Outpatient Medications on File Prior to Visit   Medication Sig Dispense Refill    Capsaicin 0.1 % CREA Apply 1 g

## 2023-10-26 NOTE — PROGRESS NOTES
Subjective:   Cass Mane is a 79 y.o. female who was seen for Flank Pain (Thinks she pulled something in her side from coughing. )    Patient has had cough for 3 days. She is scared that she broke a rib or pulled a muscle because her left side has been hurting for 24 hours. Reports she was coughing so forcefully that she has a subconjunctival hemorrhage now. Reports she tried capsaicin cream, heat and Tylenol without relief. Prior to Admission medications    Medication Sig Start Date End Date Taking?  Authorizing Provider   baclofen (LIORESAL) 10 MG tablet Take 1 tablet by mouth nightly 10/26/23  Yes Christian Izaguirre MD   lidocaine 4 % external patch Place 1 patch onto the skin daily 10/26/23 11/25/23 Yes Christian Izaguirre MD   carvedilol (COREG) 6.25 MG tablet TAKE 1 TABLET BY MOUTH TWICE DAILY ON DAYS OF DIALYSIS 10/25/23  Yes Soham RAMIREZ MD   Capsaicin 0.1 % CREA Apply 1 g topically at bedtime 10/18/23  Yes Christian Izaguirre MD   dexlansoprazole ([address] [place] [address] Street) 60 MG CPDR delayed release capsule Take 1 capsule by mouth every morning 10/9/23  Yes Christian Izaguirre MD   montelukast (SINGULAIR) 10 MG tablet TAKE 1 TABLET EVERY MORNING 9/25/23  Yes Christian Izaguirre MD   dicyclomine (BENTYL) 10 MG capsule TAKE 1 CAPSULE EVERY MORNING 9/25/23  Yes Christian Izaguirre MD   sucralfate (CARAFATE) 1 GM tablet TAKE 1 TABLET FOUR TIMES DAILY 9/25/23  Yes Christian Izaguirre MD   ELIQUIS 2.5 MG TABS tablet TAKE 1 TABLET TWICE DAILY 9/25/23  Yes Christian Izaguirre MD   simvastatin (ZOCOR) 20 MG tablet TAKE 1 TABLET EVERY DAY AS DIRECTED 9/25/23  Yes Christian Izaguirre MD   levothyroxine (SYNTHROID) 75 MCG tablet TAKE 1 TABLET EVERY DAY BEFORE BREAKFAST 9/25/23  Yes Christian Izaguirre MD   TRERACHEL ELLIPTA 100-62.5-25 MCG/ACT AEPB inhaler INHALE 1 PUFF EVERY DAY 9/18/23  Yes Christian Izaguirre MD   fluticasone (FLONASE) 50 MCG/ACT nasal spray USE 1 SPRAY IN EACH NOSTRIL EVERY MORNING 8/23/23  Yes

## 2023-10-26 NOTE — TELEPHONE ENCOUNTER
Called patient to speak about message from Ha Correa MD. Confirmed name and date of birth. Spoke with patient about message. Patient expressed understanding.

## 2023-10-26 NOTE — TELEPHONE ENCOUNTER
Patients daughter called and asked about a cough medication that her mother thought was prescribed today but not at the pharmacy.  Please Advise

## 2023-10-26 NOTE — PROGRESS NOTES
Star Reyes presents today for   Chief Complaint   Patient presents with    Flank Pain     Thinks she pulled something in her side from coughing. Is someone accompanying this pt? No   Is the patient using any DME equipment during OV? No     Health Maintenance reviewed and discussed and ordered per Provider. Health Maintenance Due   Topic Date Due    Hepatitis C screen  Never done    DTaP/Tdap/Td vaccine (1 - Tdap) Never done    Shingles vaccine (2 of 2) 04/04/2023    COVID-19 Vaccine (5 - Pfizer risk series) 04/04/2023    Flu vaccine (1) 08/01/2023   . Coordination of Care:  1. \"Have you been to the ER, urgent care clinic since your last visit? Hospitalized since your last visit? \" No    2. \"Have you seen or consulted any other health care providers outside of the [address] [place] [address] since your last visit? \" No     3. For patients aged 43-73: Has the patient had a colonoscopy? Yes- No care gap present    If the patient is female:    4. For patients aged 43-66: Has the patient had a mammogram within the past 2 years? Yes- No care gap present    5. For patients aged 21-65: Has the patient had a pap smear?  N/A based on age/sex

## 2023-10-27 ENCOUNTER — TELEPHONE (OUTPATIENT)
Facility: CLINIC | Age: 68
End: 2023-10-27

## 2023-10-27 NOTE — TELEPHONE ENCOUNTER
Patients daughter called with concerns about the medication her mother was put on yesterday. The Baclofen. She would like Dr. Anu Dominguez to call her back as soon as she can.      Call back number is [phone]

## 2023-10-30 ENCOUNTER — HOSPITAL ENCOUNTER (OUTPATIENT)
Age: 68
Discharge: HOME | End: 2023-11-02
Payer: MEDICARE

## 2023-10-30 DIAGNOSIS — T14.8XXA MUSCULOSKELETAL STRAIN: ICD-10-CM

## 2023-10-30 PROCEDURE — 71046 X-RAY EXAM CHEST 2 VIEWS: CPT

## 2023-11-01 ENCOUNTER — TELEPHONE (OUTPATIENT)
Facility: CLINIC | Age: 68
End: 2023-11-01

## 2023-11-01 NOTE — TELEPHONE ENCOUNTER
----- Message from Aquiles Hyatt MD sent at 10/30/2023 10:29 AM EDT -----  Aaron Boykin does not show any new rib fractures.  Suspect pulled muscle is the source of pain.  ----- Message -----  From: [facility] Incoming Orders Results To Radiant  Sent: 10/30/2023  10:19 AM EDT  To: Aquiles Hyatt MD

## 2023-11-03 ENCOUNTER — CARE COORDINATION (OUTPATIENT)
Facility: CLINIC | Age: 68
End: 2023-11-03

## 2023-11-03 ASSESSMENT — PATIENT HEALTH QUESTIONNAIRE - PHQ9
SUM OF ALL RESPONSES TO PHQ QUESTIONS 1-9: 1
1. LITTLE INTEREST OR PLEASURE IN DOING THINGS: 0
SUM OF ALL RESPONSES TO PHQ9 QUESTIONS 1 & 2: 1
2. FEELING DOWN, DEPRESSED OR HOPELESS: 1

## 2023-11-03 NOTE — CARE COORDINATION
Ambulatory Care Coordination Note  11/3/2023    Patient Current Location:  Home: [address]     ACM contacted the patient by telephone. Verified name and  with patient as identifiers. Provided introduction to self, and explanation of the ACM role. Challenges to be reviewed by the provider   Additional needs identified to be addressed with provider: No  none               Method of communication with provider: phone. ACM: Meryle Opoka, RN    Encounter Note:    Spoke with patient - Patient states doing well at present. No new complaints of pain or discomfort. No issues with dialysis - receiving full run  Further questions answered as needed and patient has ACM contact information   Future / follow up appointments listed below - reviewed upcoming appointments. Depression screen done - PHQ2 score = 1  Medication reconciliation done at this encounter with patient. Shows understanding of medication therapy    Review of below education, questions answered. Reinforcement of aspects of wellness. Follow up appointments reviewed. Inhaler Ed: Instructed patient on proper use of inhaler and to rinse mouth after each use to avoid complications. HTN Teaching        Always take medication as prescribed . Do not skip or stop medication unless specifically instructed to by MD or PCP. If you forget to take a dose, take it as soon as you remember. However, if it is almost time for your next dose, skip the missed dose and go back to your original schedule. Discussed symptoms of HTN - low sodium diet     Offered patient enrollment in the Remote Patient Monitoring (RPM) program for in-home monitoring: NA.     Lab Results       None            Care Coordination Interventions    Referral from Primary Care Provider: No  Suggested Interventions and Community Resources          Goals Addressed                   This Visit's Progress     Attend follow up appointments on schedule   On track     Prepare

## 2023-11-08 RX ORDER — MECLIZINE HYDROCHLORIDE 25 MG/1
TABLET ORAL
Qty: 90 TABLET | Refills: 10 | Status: SHIPPED | OUTPATIENT
Start: 2023-11-08

## 2023-11-15 NOTE — PROGRESS NOTES
Care Transitions Initial Call    Call within 2 business days of discharge: Yes     Patient Current Location: [place]    Patient: Chavez Pan Patient : 1955 MRN: [id number]    Last Discharge  [address]       Date Complaint Diagnosis Description Type Department Provider    3/9/23  Abnormal ECG . .. Admission (Discharged) Jeanna Brandt MD            Was this an external facility discharge? No     Challenges to be reviewed by the provider   Additional needs identified to be addressed with provider:   Life vest in place  [place], [place] 2/2 lifevest  OP ICD, after resolution of PNA-consider CXR  When safe to resume Eliquis? 5 mg BID- defer to cards and/or PCP  Na 132 consider repeat BMP  Hgb 8.9 consider repeat CBC           Method of communication with provider : chart routing    Discussed COVID-19 related testing which was not done at this time. Advance Care Planning:   Does patient have an Advance Directive: patient declined education. Inpatient Readmission Risk score: Unplanned Readmit Risk Score: 17.4    Was this a readmission? yes   Patient stated reason for the admission:     Patients top risk factors for readmission: functional physical ability, medical condition-cardiac arrest, medication management, and polypharmacy   Interventions to address risk factors: Scheduled appointment with PCP-3/23, Scheduled appointment with Specialist-, and Obtained and reviewed discharge summary and/or continuity of care documents    Care Transition Nurse (CTN) contacted the patient by telephone to perform post hospital discharge assessment. Verified name and  with patient as identifiers. Provided introduction to self, and explanation of the CTN role. Reports weakness and dry cough. Ambulates with walker. Denies chest pain, sob, fever, dizziness, lightheadedness, N/V. Tolerating PO. No bladder, bowel concerns.    Patient provided verbal authorization to speak with her daughter, Regina Mejía Up and showered today  Denies any chest discomfort  Per pt breathing better,room air ,O2 sats 94% room air  IV Lasix 40mg IV given bid  Prednisone 40mg po started  SR/A paced on the monitor HR 65  Will monitor      Problem: Diabetes/Glucose Control  Goal: Glucose maintained within prescribed range  Description: INTERVENTIONS:  - Monitor Blood Glucose as ordered  - Assess for signs and symptoms of hyperglycemia and hypoglycemia  - Administer ordered medications to maintain glucose within target range  - Assess barriers to adequate nutritional intake and initiate nutrition consult as needed  - Instruct patient on self management of diabetes  Outcome: Progressing     Problem: CARDIOVASCULAR - ADULT  Goal: Maintains optimal cardiac output and hemodynamic stability  Description: INTERVENTIONS:  - Monitor vital signs, rhythm, and trends  - Monitor for bleeding, hypotension and signs of decreased cardiac output  - Evaluate effectiveness of vasoactive medications to optimize hemodynamic stability  - Monitor arterial and/or venous puncture sites for bleeding and/or hematoma  - Assess quality of pulses, skin color and temperature  - Assess for signs of decreased coronary artery perfusion - ex.  Angina  - Evaluate fluid balance, assess for edema, trend weights  Outcome: Progressing  Goal: Absence of cardiac arrhythmias or at baseline  Description: INTERVENTIONS:  - Continuous cardiac monitoring, monitor vital signs, obtain 12 lead EKG if indicated  - Evaluate effectiveness of antiarrhythmic and heart rate control medications as ordered  - Initiate emergency measures for life threatening arrhythmias  - Monitor electrolytes and administer replacement therapy as ordered  Outcome: Progressing Vanessa Browne (HIPAA verified dated 9/22/22). CTN reviewed discharge instructions, medical action plan and red flags with patient who verbalized understanding. Were discharge instructions available to patient? yes. Reviewed appropriate site of care based on symptoms and resources available to patient including: PCP, Specialist, and When to call 911. Family given an opportunity to ask questions and does not have any further questions or concerns at this time. The family agrees to contact the PCP office for questions related to their healthcare. Medication reconciliation was performed with patient, who verbalizes understanding of administration of home medications. Referral to Pharm D needed: no     Home Health/Outpatient orders at discharge: none    Durable Medical Equipment ordered at discharge: None    Was patient discharged with a pulse oximeter? no    Discussed follow-up appointments. If no appointment was previously scheduled, appointment scheduling offered: yes. Is follow up appointment scheduled within 7 days of discharge? yes. [facility] follow up appointment(s): No future appointments. Non-[facility] follow up appointment(s): 3/21 Dr. Florence Chapman (enma), 11am; 3/23 Dr. Kassy Madrid (PCP); 4/27 Dr. Amber Nuñez (enma)    Plan for follow-up call in 5-7 days based on severity of symptoms and risk factors. Plan for next call: self management-cp, lifevest, follow up appointment-enma douglass, and medication management-Eliquis  CTN provided contact information for future needs. Goals Addressed                   This Visit's Progress     Attend follow up appointments on schedule        Prevent complications post hospitalization.         03/20/23  Absence of falls  Will report compliance with lifevest  Will attend follow up appt with Dr. Florence Chapman (enma) scheduled 3/21 and Dr. Kassy Madrid scheduled 3/23  Will complete abx therapy

## 2023-11-17 ENCOUNTER — CARE COORDINATION (OUTPATIENT)
Facility: CLINIC | Age: 68
End: 2023-11-17

## 2023-11-30 ENCOUNTER — CARE COORDINATION (OUTPATIENT)
Facility: CLINIC | Age: 68
End: 2023-11-30

## 2023-11-30 ASSESSMENT — PATIENT HEALTH QUESTIONNAIRE - PHQ9
SUM OF ALL RESPONSES TO PHQ9 QUESTIONS 1 & 2: 1
2. FEELING DOWN, DEPRESSED OR HOPELESS: 1
SUM OF ALL RESPONSES TO PHQ QUESTIONS 1-9: 1
1. LITTLE INTEREST OR PLEASURE IN DOING THINGS: 0
SUM OF ALL RESPONSES TO PHQ QUESTIONS 1-9: 1

## 2023-11-30 ASSESSMENT — ENCOUNTER SYMPTOMS: DYSPNEA ASSOCIATED WITH: EXERTION

## 2023-11-30 NOTE — CARE COORDINATION
Ambulatory Care Coordination Note  2023    Patient Current Location:  Home: [address]     ACM contacted the patient by telephone. Verified name and  with patient as identifiers. Provided introduction to self, and explanation of the ACM role. Challenges to be reviewed by the provider   Additional needs identified to be addressed with provider: Yes - Allergy like symptoms including headache. Trying OTC meds but no relief. Advised Patient to see [facility] facility. none               Method of communication with provider: phone. ACM: Jay Coffman RN    Encounter Note:    Spoke with patient - Patient states doing well at present. No new complaints of pain or discomfort. See yellow box above. Further questions answered as needed and patient has ACM contact information   Future / follow up appointments listed below - reviewed upcoming appointments. Depression screen done - PHQ2 score = 1     Review of below education, questions answered. Reinforcement of aspects of wellness. Follow up appointments reviewed. Inhaler Ed: Instructed patient on proper use of inhaler and to rinse mouth after each use to avoid complications. HTN Teaching        Always take medication as prescribed . Do not skip or stop medication unless specifically instructed to by MD or PCP. If you forget to take a dose, take it as soon as you remember. However, if it is almost time for your next dose, skip the missed dose and go back to your original schedule. Discussed symptoms of HTN - low sodium diet    Offered patient enrollment in the Remote Patient Monitoring (RPM) program for in-home monitorin [facility]. Patient  .     Lab Results       None            Care Coordination Interventions    Referral from Primary Care Provider: No  Suggested Interventions and Community Resources          Goals Addressed                   This Visit's Progress     Attend follow up appointments on schedule   On

## 2023-12-07 ENCOUNTER — CARE COORDINATION (OUTPATIENT)
Facility: CLINIC | Age: 68
End: 2023-12-07

## 2023-12-07 ASSESSMENT — PATIENT HEALTH QUESTIONNAIRE - PHQ9
SUM OF ALL RESPONSES TO PHQ QUESTIONS 1-9: 2
SUM OF ALL RESPONSES TO PHQ QUESTIONS 1-9: 2
2. FEELING DOWN, DEPRESSED OR HOPELESS: 1
SUM OF ALL RESPONSES TO PHQ QUESTIONS 1-9: 2
1. LITTLE INTEREST OR PLEASURE IN DOING THINGS: 1
SUM OF ALL RESPONSES TO PHQ QUESTIONS 1-9: 2
SUM OF ALL RESPONSES TO PHQ9 QUESTIONS 1 & 2: 2

## 2023-12-07 NOTE — CARE COORDINATION
Ambulatory Care Coordination Note  2023    Patient Current Location:  Home: [address]     ACM contacted the patient by telephone. Verified name and  with patient as identifiers. Provided introduction to self, and explanation of the ACM role. Challenges to be reviewed by the provider   Additional needs identified to be addressed with provider: No  none               Method of communication with provider: phone. ACM: July Vaughn RN    Encounter Note:    Spoke with patient - Patient states she does not feel well at present. Headache, congestion, rhinitis / rhinorrhea - schedule to see PCP in AM  Further questions answered as needed and patient has ACM contact information   Future / follow up appointments listed below - reviewed upcoming appointments. Depression screen done - PHQ2 score = 2  Medication reconciliation done at this encounter with patient. Shows understanding of medication therapy    Review of below education, questions answered. Reinforcement of aspects of wellness. Follow up appointments reviewed. HTN Teaching        Always take medication as prescribed . Do not skip or stop medication unless specifically instructed to by MD or PCP. If you forget to take a dose, take it as soon as you remember. However, if it is almost time for your next dose, skip the missed dose and go back to your original schedule. Discussed symptoms of HTN - low sodium diet     Offered patient enrollment in the Remote Patient Monitoring (RPM) program for in-home monitoring: Patient declined.     Lab Results       None            Care Coordination Interventions    Referral from Primary Care Provider: No  Suggested Interventions and Community Resources          Goals Addressed                   This Visit's Progress     Attend follow up appointments on schedule   On track     Prepare patients and caregivers for end of life decisions (ie. need for hospice, pain management, symptom relief,

## 2023-12-08 ENCOUNTER — TELEPHONE (OUTPATIENT)
Facility: CLINIC | Age: 68
End: 2023-12-08

## 2023-12-08 ENCOUNTER — HOSPITAL ENCOUNTER (EMERGENCY)
Age: 68
Discharge: HOME | End: 2023-12-08
Attending: FAMILY MEDICINE
Payer: MEDICARE

## 2023-12-08 VITALS
OXYGEN SATURATION: 96 % | DIASTOLIC BLOOD PRESSURE: 92 MMHG | BODY MASS INDEX: 28.89 KG/M2 | TEMPERATURE: 98.5 F | WEIGHT: 153 LBS | SYSTOLIC BLOOD PRESSURE: 192 MMHG | HEIGHT: 61 IN | HEART RATE: 76 BPM | RESPIRATION RATE: 16 BRPM

## 2023-12-08 DIAGNOSIS — G43.909 MIGRAINE WITHOUT STATUS MIGRAINOSUS, NOT INTRACTABLE, UNSPECIFIED MIGRAINE TYPE: Primary | ICD-10-CM

## 2023-12-08 DIAGNOSIS — B34.9 VIRAL SYNDROME: ICD-10-CM

## 2023-12-08 DIAGNOSIS — I10 ESSENTIAL HYPERTENSION: ICD-10-CM

## 2023-12-08 DIAGNOSIS — R11.0 NAUSEA: ICD-10-CM

## 2023-12-08 LAB
FLUAV AG NPH QL IA: NEGATIVE
FLUBV AG NOSE QL IA: NEGATIVE

## 2023-12-08 PROCEDURE — 87804 INFLUENZA ASSAY W/OPTIC: CPT

## 2023-12-08 PROCEDURE — 96374 THER/PROPH/DIAG INJ IV PUSH: CPT

## 2023-12-08 PROCEDURE — 6370000000 HC RX 637 (ALT 250 FOR IP): Performed by: FAMILY MEDICINE

## 2023-12-08 PROCEDURE — 6360000002 HC RX W HCPCS: Performed by: FAMILY MEDICINE

## 2023-12-08 PROCEDURE — 99284 EMERGENCY DEPT VISIT MOD MDM: CPT

## 2023-12-08 RX ORDER — HYDRALAZINE HYDROCHLORIDE 25 MG/1
25 TABLET, FILM COATED ORAL
Status: DISCONTINUED | OUTPATIENT
Start: 2023-12-08 | End: 2023-12-08

## 2023-12-08 RX ORDER — KETOROLAC TROMETHAMINE 30 MG/ML
15 INJECTION, SOLUTION INTRAMUSCULAR; INTRAVENOUS
Status: COMPLETED | OUTPATIENT
Start: 2023-12-08 | End: 2023-12-08

## 2023-12-08 RX ORDER — ONDANSETRON 4 MG/1
4 TABLET, FILM COATED ORAL EVERY 8 HOURS PRN
Qty: 10 TABLET | Refills: 0 | Status: SHIPPED | OUTPATIENT
Start: 2023-12-08 | End: 2023-12-15

## 2023-12-08 RX ORDER — ACETAMINOPHEN 500 MG
1000 TABLET ORAL
Status: COMPLETED | OUTPATIENT
Start: 2023-12-08 | End: 2023-12-08

## 2023-12-08 RX ORDER — ONDANSETRON 4 MG/1
4 TABLET, ORALLY DISINTEGRATING ORAL
Status: COMPLETED | OUTPATIENT
Start: 2023-12-08 | End: 2023-12-08

## 2023-12-08 RX ORDER — CARVEDILOL 12.5 MG/1
6.25 TABLET ORAL
Status: COMPLETED | OUTPATIENT
Start: 2023-12-08 | End: 2023-12-08

## 2023-12-08 RX ADMIN — KETOROLAC TROMETHAMINE 15 MG: 30 INJECTION INTRAMUSCULAR; INTRAVENOUS at 10:51

## 2023-12-08 RX ADMIN — ACETAMINOPHEN 1000 MG: 500 TABLET ORAL at 10:49

## 2023-12-08 RX ADMIN — CARVEDILOL 6.25 MG: 12.5 TABLET, FILM COATED ORAL at 10:49

## 2023-12-08 RX ADMIN — ONDANSETRON 4 MG: 4 TABLET, ORALLY DISINTEGRATING ORAL at 10:49

## 2023-12-08 ASSESSMENT — PAIN - FUNCTIONAL ASSESSMENT: PAIN_FUNCTIONAL_ASSESSMENT: NONE - DENIES PAIN

## 2023-12-08 ASSESSMENT — ENCOUNTER SYMPTOMS
COUGH: 0
SORE THROAT: 0

## 2023-12-08 NOTE — DISCHARGE INSTRUCTIONS
As we spoke, influenza is negative. I think this is a multifaceted problem including a migraine coupled with a viral issue which is causing you to have some nausea. I did give you your Coreg medicine here. I have called in a prescription for your Zofran to help with any nausea the key is to stay hydrated. My recommendation is to follow-up with your primary care doctor. Virus infections usually take several days to improve. Use the Zofran to help with any nausea stay hydrated by drinking plenty of fluids Tylenol or ibuprofen for any pains if you can tolerate them and have no allergies.   Return to the emergency department if you have any questions or concerns

## 2023-12-08 NOTE — TELEPHONE ENCOUNTER
Patient called requesting an appointment, she has a low grade fever with headaches, and cold like symptoms. No appointments available. Reviewed her symptoms with Tamiko Strong who recommended urgent care. Patient refused and stated the man told her not to go there. She would like a call.  Please Advise

## 2023-12-10 ENCOUNTER — APPOINTMENT (OUTPATIENT)
Age: 68
End: 2023-12-10
Payer: MEDICARE

## 2023-12-10 ENCOUNTER — HOSPITAL ENCOUNTER (EMERGENCY)
Age: 68
Discharge: HOME | End: 2023-12-10
Attending: EMERGENCY MEDICINE
Payer: MEDICARE

## 2023-12-10 VITALS
WEIGHT: 145 LBS | BODY MASS INDEX: 27.38 KG/M2 | HEIGHT: 61 IN | DIASTOLIC BLOOD PRESSURE: 81 MMHG | TEMPERATURE: 98 F | RESPIRATION RATE: 19 BRPM | HEART RATE: 84 BPM | OXYGEN SATURATION: 98 % | SYSTOLIC BLOOD PRESSURE: 192 MMHG

## 2023-12-10 DIAGNOSIS — R11.0 NAUSEA: ICD-10-CM

## 2023-12-10 DIAGNOSIS — B34.9 VIRAL ILLNESS: Primary | ICD-10-CM

## 2023-12-10 LAB
ANION GAP SERPL CALC-SCNC: 13 MMOL/L (ref 3–18)
APPEARANCE UR: CLEAR
BACTERIA URNS QL MICRO: ABNORMAL /HPF
BASOPHILS # BLD: 0.1 K/UL (ref 0–0.1)
BASOPHILS NFR BLD: 1 % (ref 0–2)
BILIRUB UR QL: NEGATIVE
BUN SERPL-MCNC: 42 MG/DL (ref 7–18)
BUN/CREAT SERPL: 4 (ref 12–20)
CA-I BLD-MCNC: 8.8 MG/DL (ref 8.5–10.1)
CHLORIDE SERPL-SCNC: 98 MMOL/L (ref 100–111)
CO2 SERPL-SCNC: 24 MMOL/L (ref 21–32)
COLOR UR: YELLOW
CREAT SERPL-MCNC: 11.1 MG/DL (ref 0.6–1.3)
DIFFERENTIAL METHOD BLD: ABNORMAL
EOSINOPHIL # BLD: 0.4 K/UL (ref 0–0.4)
EOSINOPHIL NFR BLD: 6 % (ref 0–5)
EPITH CASTS URNS QL MICRO: ABNORMAL /LPF (ref 0–20)
ERYTHROCYTE [DISTWIDTH] IN BLOOD BY AUTOMATED COUNT: 13.5 % (ref 11.6–14.5)
GLUCOSE SERPL-MCNC: 91 MG/DL (ref 74–99)
GLUCOSE UR STRIP.AUTO-MCNC: NEGATIVE MG/DL
HCT VFR BLD AUTO: 32 % (ref 35–45)
HGB BLD-MCNC: 10.6 G/DL (ref 12–16)
HGB UR QL STRIP: ABNORMAL
IMM GRANULOCYTES # BLD AUTO: 0 K/UL (ref 0–0.04)
IMM GRANULOCYTES NFR BLD AUTO: 0 % (ref 0–0.5)
KETONES UR QL STRIP.AUTO: NEGATIVE MG/DL
LEUKOCYTE ESTERASE UR QL STRIP.AUTO: NEGATIVE
LYMPHOCYTES # BLD: 1.9 K/UL (ref 0.9–3.6)
LYMPHOCYTES NFR BLD: 32 % (ref 21–52)
MCH RBC QN AUTO: 31.9 PG (ref 24–34)
MCHC RBC AUTO-ENTMCNC: 33.1 G/DL (ref 31–37)
MCV RBC AUTO: 96.4 FL (ref 78–100)
MONOCYTES # BLD: 0.6 K/UL (ref 0.05–1.2)
MONOCYTES NFR BLD: 11 % (ref 3–10)
NEUTS SEG # BLD: 3 K/UL (ref 1.8–8)
NEUTS SEG NFR BLD: 50 % (ref 40–73)
NITRITE UR QL STRIP.AUTO: NEGATIVE
NRBC # BLD: 0 K/UL (ref 0–0.01)
NRBC BLD-RTO: 0 PER 100 WBC
PH UR STRIP: 7 (ref 5–8)
PLATELET # BLD AUTO: 164 K/UL (ref 135–420)
PMV BLD AUTO: 9.6 FL (ref 9.2–11.8)
POTASSIUM SERPL-SCNC: 4.6 MMOL/L (ref 3.5–5.5)
PROT UR STRIP-MCNC: 300 MG/DL
RBC # BLD AUTO: 3.32 M/UL (ref 4.2–5.3)
RBC #/AREA URNS HPF: ABNORMAL /HPF (ref 0–2)
SODIUM SERPL-SCNC: 135 MMOL/L (ref 136–145)
SP GR UR REFRACTOMETRY: 1.01 (ref 1–1.03)
UROBILINOGEN UR QL STRIP.AUTO: 0.2 EU/DL (ref 0.2–1)
WBC # BLD AUTO: 5.9 K/UL (ref 4.6–13.2)
WBC URNS QL MICRO: ABNORMAL /HPF (ref 0–4)

## 2023-12-10 PROCEDURE — 81001 URINALYSIS AUTO W/SCOPE: CPT

## 2023-12-10 PROCEDURE — 93005 ELECTROCARDIOGRAM TRACING: CPT | Performed by: EMERGENCY MEDICINE

## 2023-12-10 PROCEDURE — 6370000000 HC RX 637 (ALT 250 FOR IP): Performed by: EMERGENCY MEDICINE

## 2023-12-10 PROCEDURE — 85025 COMPLETE CBC W/AUTO DIFF WBC: CPT

## 2023-12-10 PROCEDURE — 80048 BASIC METABOLIC PNL TOTAL CA: CPT

## 2023-12-10 PROCEDURE — 99285 EMERGENCY DEPT VISIT HI MDM: CPT

## 2023-12-10 PROCEDURE — 71045 X-RAY EXAM CHEST 1 VIEW: CPT

## 2023-12-10 PROCEDURE — 2580000003 HC RX 258: Performed by: EMERGENCY MEDICINE

## 2023-12-10 RX ORDER — ONDANSETRON 4 MG/1
4 TABLET, ORALLY DISINTEGRATING ORAL
Status: COMPLETED | OUTPATIENT
Start: 2023-12-10 | End: 2023-12-10

## 2023-12-10 RX ORDER — MAGNESIUM HYDROXIDE/ALUMINUM HYDROXICE/SIMETHICONE 120; 1200; 1200 MG/30ML; MG/30ML; MG/30ML
30 SUSPENSION ORAL
Status: COMPLETED | OUTPATIENT
Start: 2023-12-10 | End: 2023-12-10

## 2023-12-10 RX ORDER — ONDANSETRON 4 MG/1
4 TABLET, FILM COATED ORAL 3 TIMES DAILY PRN
Qty: 15 TABLET | Refills: 0 | Status: SHIPPED | OUTPATIENT
Start: 2023-12-10

## 2023-12-10 RX ORDER — SODIUM CHLORIDE, SODIUM LACTATE, POTASSIUM CHLORIDE, AND CALCIUM CHLORIDE .6; .31; .03; .02 G/100ML; G/100ML; G/100ML; G/100ML
1000 INJECTION, SOLUTION INTRAVENOUS ONCE
Status: DISCONTINUED | OUTPATIENT
Start: 2023-12-10 | End: 2023-12-10

## 2023-12-10 RX ADMIN — ONDANSETRON 4 MG: 4 TABLET, ORALLY DISINTEGRATING ORAL at 19:46

## 2023-12-10 RX ADMIN — ALUMINUM HYDROXIDE, MAGNESIUM HYDROXIDE, AND SIMETHICONE 30 ML: 200; 200; 20 SUSPENSION ORAL at 19:46

## 2023-12-10 RX ADMIN — SODIUM CHLORIDE, POTASSIUM CHLORIDE, SODIUM LACTATE AND CALCIUM CHLORIDE 1000 ML: 600; 310; 30; 20 INJECTION, SOLUTION INTRAVENOUS at 18:51

## 2023-12-10 ASSESSMENT — PAIN DESCRIPTION - ORIENTATION: ORIENTATION: LOWER

## 2023-12-10 ASSESSMENT — PAIN DESCRIPTION - DESCRIPTORS: DESCRIPTORS: ACHING

## 2023-12-10 ASSESSMENT — PAIN DESCRIPTION - LOCATION: LOCATION: ABDOMEN

## 2023-12-10 ASSESSMENT — PAIN SCALES - GENERAL
PAINLEVEL_OUTOF10: 10
PAINLEVEL_OUTOF10: 10

## 2023-12-10 NOTE — ED TRIAGE NOTES
Pt arrived by EMT, pt report feeling bad and having fever after dialysis on Saturday. Pt report she have been in contact with Covid. Pt report headache, weakness, emesis and suspect she also may have UTI.

## 2023-12-10 NOTE — ED PROVIDER NOTES
(7/26/2023)    PRAPARE - Transportation     Lack of Transportation (Non-Medical): Patient refused   Recent Concern: Transportation Needs - Unmet Transportation Needs (7/26/2023)    PRAPARE - Transportation     Lack of Transportation (Medical): Yes     Lack of Transportation (Non-Medical): Patient refused   Physical Activity: Insufficiently Active (8/16/2023)    Exercise Vital Sign     Days of Exercise per Week: 2 days     Minutes of Exercise per Session: 30 min   Housing Stability: Unknown (7/26/2023)    Housing Stability Vital Sign     Unstable Housing in the Last Year: Patient refused       SCREENINGS                               CIWA Assessment  BP: (!) 192/81  Pulse: 84                 PHYSICAL EXAM    (up to 7 for level 4, 8 or more for level 5)     ED Triage Vitals   BP Temp Temp src Pulse Resp SpO2 Height Weight   -- -- -- -- -- -- -- --       Physical Exam  Vitals and nursing note reviewed. Constitutional:       General: She is not in acute distress. Appearance: Normal appearance. She is obese. She is not ill-appearing, toxic-appearing or diaphoretic. HENT:      Head: Normocephalic and atraumatic. Right Ear: Tympanic membrane, ear canal and external ear normal.      Left Ear: Tympanic membrane and external ear normal.      Nose: Nose normal. No congestion or rhinorrhea. Mouth/Throat:      Mouth: Mucous membranes are moist.      Pharynx: Oropharynx is clear. No oropharyngeal exudate or posterior oropharyngeal erythema. Eyes:      General: No scleral icterus. Right eye: No discharge. Left eye: No discharge. Conjunctiva/sclera: Conjunctivae normal.      Pupils: Pupils are equal, round, and reactive to light. Neck:      Vascular: No carotid bruit. Cardiovascular:      Rate and Rhythm: Normal rate and regular rhythm. Pulses: Normal pulses. Heart sounds: Normal heart sounds. No murmur heard. No friction rub. No gallop.    Pulmonary:      Effort: Pulmonary

## 2023-12-11 LAB
EKG ATRIAL RATE: 82 BPM
EKG DIAGNOSIS: NORMAL
EKG P AXIS: 49 DEGREES
EKG P-R INTERVAL: 200 MS
EKG Q-T INTERVAL: 424 MS
EKG QRS DURATION: 90 MS
EKG QTC CALCULATION (BAZETT): 495 MS
EKG R AXIS: 44 DEGREES
EKG T AXIS: 79 DEGREES
EKG VENTRICULAR RATE: 82 BPM

## 2023-12-11 NOTE — ED NOTES
Patient signed out to me pending urinalysis and basic metabolic panel. Labs are essentially baseline for the patient. No signs of UTI on urinalysis. Patient took Plan B about nausea and burning epigastrically. Patient given Maalox and Zofran. Discussed findings with patient. Patient struck to follow-up with her PCP and return the emergency room for worsening symptoms or any other concerns.      Rock Ramey DO  12/10/23 3928

## 2023-12-11 NOTE — ED NOTES
Received care of patient from previous shift, reports pain in abdomen. Rates 9/10 also reports nausea, Dr Malia Lucero at bedside.       Veronica Duncan RN  12/10/23 [id number]

## 2023-12-11 NOTE — DISCHARGE INSTRUCTIONS
You were seen for your multiple symptoms. Your labs look at baseline for you. Your chest x-ray is clear. Your urine does not show any urinary tract infection. You are given a prescription for Zofran. Continue taking your meds as prescribed. Follow-up with your PCP. Return the emergency room for worsening symptoms or other concerns.

## 2023-12-12 ENCOUNTER — CARE COORDINATION (OUTPATIENT)
Facility: CLINIC | Age: 68
End: 2023-12-12

## 2023-12-13 ENCOUNTER — APPOINTMENT (OUTPATIENT)
Age: 68
End: 2023-12-13
Payer: MEDICARE

## 2023-12-13 ENCOUNTER — HOSPITAL ENCOUNTER (OUTPATIENT)
Age: 68
Setting detail: OBSERVATION
Discharge: HOME | End: 2023-12-15
Attending: EMERGENCY MEDICINE | Admitting: INTERNAL MEDICINE
Payer: MEDICARE

## 2023-12-13 DIAGNOSIS — R10.9 CHRONIC ABDOMINAL PAIN: ICD-10-CM

## 2023-12-13 DIAGNOSIS — R53.83 FATIGUE, UNSPECIFIED TYPE: ICD-10-CM

## 2023-12-13 DIAGNOSIS — R06.09 DYSPNEA ON EXERTION: ICD-10-CM

## 2023-12-13 DIAGNOSIS — E87.70 HYPERVOLEMIA, UNSPECIFIED HYPERVOLEMIA TYPE: Primary | ICD-10-CM

## 2023-12-13 DIAGNOSIS — G89.29 CHRONIC ABDOMINAL PAIN: ICD-10-CM

## 2023-12-13 DIAGNOSIS — R11.2 NAUSEA AND VOMITING, UNSPECIFIED VOMITING TYPE: ICD-10-CM

## 2023-12-13 LAB
ALBUMIN SERPL-MCNC: 3.6 G/DL (ref 3.4–5)
ALBUMIN/GLOB SERPL: 1 (ref 0.8–1.7)
ALP SERPL-CCNC: 240 U/L (ref 45–117)
ALT SERPL-CCNC: 33 U/L (ref 13–56)
ANION GAP SERPL CALC-SCNC: 14 MMOL/L (ref 3–18)
APPEARANCE UR: CLEAR
AST SERPL W P-5'-P-CCNC: 31 U/L (ref 10–38)
BACTERIA URNS QL MICRO: ABNORMAL /HPF
BASOPHILS # BLD: 0.1 K/UL (ref 0–0.1)
BASOPHILS NFR BLD: 1 % (ref 0–2)
BILIRUB SERPL-MCNC: 0.5 MG/DL (ref 0.2–1)
BILIRUB UR QL: NEGATIVE
BNP SERPL-MCNC: ABNORMAL PG/ML (ref 0–900)
BUN SERPL-MCNC: 21 MG/DL (ref 7–18)
BUN/CREAT SERPL: 2 (ref 12–20)
CA-I BLD-MCNC: 8.7 MG/DL (ref 8.5–10.1)
CHLORIDE SERPL-SCNC: 98 MMOL/L (ref 100–111)
CK SERPL-CCNC: 60 U/L (ref 26–192)
CO2 SERPL-SCNC: 24 MMOL/L (ref 21–32)
COLLECT DATE STL: NORMAL
COLOR UR: YELLOW
CREAT SERPL-MCNC: 10.5 MG/DL (ref 0.6–1.3)
DEPRECATED S PYO AG THROAT QL EIA: NEGATIVE
DIFFERENTIAL METHOD BLD: ABNORMAL
EOSINOPHIL # BLD: 0.5 K/UL (ref 0–0.4)
EOSINOPHIL NFR BLD: 7 % (ref 0–5)
EPITH CASTS URNS QL MICRO: ABNORMAL /LPF (ref 0–20)
ERYTHROCYTE [DISTWIDTH] IN BLOOD BY AUTOMATED COUNT: 13.6 % (ref 11.6–14.5)
ERYTHROCYTE [SEDIMENTATION RATE] IN BLOOD: 68 MM/HR (ref 0–30)
FLUAV AG NPH QL IA: NEGATIVE
FLUBV AG NOSE QL IA: NEGATIVE
GLOBULIN SER CALC-MCNC: 3.5 G/DL (ref 2–4)
GLUCOSE BLD STRIP.AUTO-MCNC: 99 MG/DL (ref 70–110)
GLUCOSE SERPL-MCNC: 104 MG/DL (ref 74–99)
GLUCOSE UR STRIP.AUTO-MCNC: NEGATIVE MG/DL
HCT VFR BLD AUTO: 35.2 % (ref 35–45)
HEMOCCULT SP1 STL QL: NEGATIVE
HGB BLD-MCNC: 11.7 G/DL (ref 12–16)
HGB UR QL STRIP: ABNORMAL
IMM GRANULOCYTES # BLD AUTO: 0 K/UL (ref 0–0.04)
IMM GRANULOCYTES NFR BLD AUTO: 0 % (ref 0–0.5)
KETONES UR QL STRIP.AUTO: NEGATIVE MG/DL
LACTATE SERPL-SCNC: 0.8 MMOL/L (ref 0.4–2)
LEUKOCYTE ESTERASE UR QL STRIP.AUTO: NEGATIVE
LIPASE SERPL-CCNC: 45 U/L (ref 13–75)
LYMPHOCYTES # BLD: 1.9 K/UL (ref 0.9–3.6)
LYMPHOCYTES NFR BLD: 26 % (ref 21–52)
MAGNESIUM SERPL-MCNC: 2.6 MG/DL (ref 1.6–2.6)
MCH RBC QN AUTO: 31.8 PG (ref 24–34)
MCHC RBC AUTO-ENTMCNC: 33.2 G/DL (ref 31–37)
MCV RBC AUTO: 95.7 FL (ref 78–100)
MONOCYTES # BLD: 0.7 K/UL (ref 0.05–1.2)
MONOCYTES NFR BLD: 10 % (ref 3–10)
NEUTS SEG # BLD: 4 K/UL (ref 1.8–8)
NEUTS SEG NFR BLD: 56 % (ref 40–73)
NITRITE UR QL STRIP.AUTO: NEGATIVE
NRBC # BLD: 0 K/UL (ref 0–0.01)
NRBC BLD-RTO: 0 PER 100 WBC
PERFORMED BY:: NORMAL
PH UR STRIP: 7.5 (ref 5–8)
PLATELET # BLD AUTO: 184 K/UL (ref 135–420)
PMV BLD AUTO: 9.1 FL (ref 9.2–11.8)
POTASSIUM SERPL-SCNC: 4 MMOL/L (ref 3.5–5.5)
PROT SERPL-MCNC: 7.1 G/DL (ref 6.4–8.2)
PROT UR STRIP-MCNC: 300 MG/DL
RBC # BLD AUTO: 3.68 M/UL (ref 4.2–5.3)
RBC #/AREA URNS HPF: ABNORMAL /HPF (ref 0–2)
SARS-COV-2 RDRP RESP QL NAA+PROBE: NOT DETECTED
SODIUM SERPL-SCNC: 136 MMOL/L (ref 136–145)
SP GR UR REFRACTOMETRY: 1.01 (ref 1–1.03)
TROPONIN I SERPL HS-MCNC: 50 NG/L (ref 0–54)
TSH SERPL DL<=0.05 MIU/L-ACNC: 4.46 UIU/ML (ref 0.36–3.74)
UROBILINOGEN UR QL STRIP.AUTO: 0.2 EU/DL (ref 0.2–1)
WBC # BLD AUTO: 7.2 K/UL (ref 4.6–13.2)
WBC URNS QL MICRO: ABNORMAL /HPF (ref 0–4)

## 2023-12-13 PROCEDURE — 6370000000 HC RX 637 (ALT 250 FOR IP): Performed by: INTERNAL MEDICINE

## 2023-12-13 PROCEDURE — 71045 X-RAY EXAM CHEST 1 VIEW: CPT

## 2023-12-13 PROCEDURE — 83690 ASSAY OF LIPASE: CPT

## 2023-12-13 PROCEDURE — 94640 AIRWAY INHALATION TREATMENT: CPT

## 2023-12-13 PROCEDURE — 99222 1ST HOSP IP/OBS MODERATE 55: CPT | Performed by: INTERNAL MEDICINE

## 2023-12-13 PROCEDURE — 86706 HEP B SURFACE ANTIBODY: CPT

## 2023-12-13 PROCEDURE — 87070 CULTURE OTHR SPECIMN AEROBIC: CPT

## 2023-12-13 PROCEDURE — 6370000000 HC RX 637 (ALT 250 FOR IP): Performed by: NURSE PRACTITIONER

## 2023-12-13 PROCEDURE — 90935 HEMODIALYSIS ONE EVALUATION: CPT

## 2023-12-13 PROCEDURE — 83735 ASSAY OF MAGNESIUM: CPT

## 2023-12-13 PROCEDURE — 83605 ASSAY OF LACTIC ACID: CPT

## 2023-12-13 PROCEDURE — 81001 URINALYSIS AUTO W/SCOPE: CPT

## 2023-12-13 PROCEDURE — G0378 HOSPITAL OBSERVATION PER HR: HCPCS

## 2023-12-13 PROCEDURE — 80053 COMPREHEN METABOLIC PANEL: CPT

## 2023-12-13 PROCEDURE — 6360000002 HC RX W HCPCS: Performed by: EMERGENCY MEDICINE

## 2023-12-13 PROCEDURE — 96375 TX/PRO/DX INJ NEW DRUG ADDON: CPT

## 2023-12-13 PROCEDURE — 87880 STREP A ASSAY W/OPTIC: CPT

## 2023-12-13 PROCEDURE — 51701 INSERT BLADDER CATHETER: CPT

## 2023-12-13 PROCEDURE — 87340 HEPATITIS B SURFACE AG IA: CPT

## 2023-12-13 PROCEDURE — 84443 ASSAY THYROID STIM HORMONE: CPT

## 2023-12-13 PROCEDURE — 84484 ASSAY OF TROPONIN QUANT: CPT

## 2023-12-13 PROCEDURE — 85651 RBC SED RATE NONAUTOMATED: CPT

## 2023-12-13 PROCEDURE — 87635 SARS-COV-2 COVID-19 AMP PRB: CPT

## 2023-12-13 PROCEDURE — 93005 ELECTROCARDIOGRAM TRACING: CPT | Performed by: EMERGENCY MEDICINE

## 2023-12-13 PROCEDURE — 6360000002 HC RX W HCPCS: Performed by: INTERNAL MEDICINE

## 2023-12-13 PROCEDURE — 87804 INFLUENZA ASSAY W/OPTIC: CPT

## 2023-12-13 PROCEDURE — 74176 CT ABD & PELVIS W/O CONTRAST: CPT

## 2023-12-13 PROCEDURE — 70450 CT HEAD/BRAIN W/O DYE: CPT

## 2023-12-13 PROCEDURE — G0378 HOSPITAL OBSERVATION PER HR: HCPCS | Performed by: INTERNAL MEDICINE

## 2023-12-13 PROCEDURE — 83880 ASSAY OF NATRIURETIC PEPTIDE: CPT

## 2023-12-13 PROCEDURE — 82550 ASSAY OF CK (CPK): CPT

## 2023-12-13 PROCEDURE — 82962 GLUCOSE BLOOD TEST: CPT

## 2023-12-13 PROCEDURE — 96374 THER/PROPH/DIAG INJ IV PUSH: CPT

## 2023-12-13 PROCEDURE — 94761 N-INVAS EAR/PLS OXIMETRY MLT: CPT

## 2023-12-13 PROCEDURE — 82272 OCCULT BLD FECES 1-3 TESTS: CPT

## 2023-12-13 PROCEDURE — 99285 EMERGENCY DEPT VISIT HI MDM: CPT

## 2023-12-13 PROCEDURE — 85025 COMPLETE CBC W/AUTO DIFF WBC: CPT

## 2023-12-13 RX ORDER — ONDANSETRON 2 MG/ML
4 INJECTION INTRAMUSCULAR; INTRAVENOUS
Status: COMPLETED | OUTPATIENT
Start: 2023-12-13 | End: 2023-12-13

## 2023-12-13 RX ORDER — ONDANSETRON 4 MG/1
4 TABLET, ORALLY DISINTEGRATING ORAL ONCE
Status: COMPLETED | OUTPATIENT
Start: 2023-12-13 | End: 2023-12-13

## 2023-12-13 RX ORDER — SODIUM CHLORIDE, SODIUM LACTATE, POTASSIUM CHLORIDE, CALCIUM CHLORIDE 600; 310; 30; 20 MG/100ML; MG/100ML; MG/100ML; MG/100ML
INJECTION, SOLUTION INTRAVENOUS CONTINUOUS
Status: DISCONTINUED | OUTPATIENT
Start: 2023-12-13 | End: 2023-12-13 | Stop reason: CLARIF

## 2023-12-13 RX ORDER — CARVEDILOL 6.25 MG/1
6.25 TABLET ORAL 2 TIMES DAILY WITH MEALS
Status: DISCONTINUED | OUTPATIENT
Start: 2023-12-13 | End: 2023-12-15 | Stop reason: HOSPADM

## 2023-12-13 RX ORDER — SODIUM CHLORIDE 0.9 % (FLUSH) 0.9 %
5-40 SYRINGE (ML) INJECTION PRN
Status: DISCONTINUED | OUTPATIENT
Start: 2023-12-13 | End: 2023-12-15 | Stop reason: HOSPADM

## 2023-12-13 RX ORDER — SUCRALFATE 1 G/1
1 TABLET ORAL
Status: DISCONTINUED | OUTPATIENT
Start: 2023-12-13 | End: 2023-12-15 | Stop reason: HOSPADM

## 2023-12-13 RX ORDER — PROMETHAZINE HYDROCHLORIDE 25 MG/1
25 SUPPOSITORY RECTAL EVERY 6 HOURS PRN
COMMUNITY
End: 2023-12-13

## 2023-12-13 RX ORDER — PANTOPRAZOLE SODIUM 40 MG/1
40 TABLET, DELAYED RELEASE ORAL
Status: DISCONTINUED | OUTPATIENT
Start: 2023-12-14 | End: 2023-12-15 | Stop reason: HOSPADM

## 2023-12-13 RX ORDER — SODIUM CHLORIDE 0.9 % (FLUSH) 0.9 %
5-40 SYRINGE (ML) INJECTION EVERY 12 HOURS SCHEDULED
Status: DISCONTINUED | OUTPATIENT
Start: 2023-12-13 | End: 2023-12-15 | Stop reason: HOSPADM

## 2023-12-13 RX ORDER — CLONIDINE 0.3 MG/24H
1 PATCH, EXTENDED RELEASE TRANSDERMAL WEEKLY
Status: DISCONTINUED | OUTPATIENT
Start: 2023-12-13 | End: 2023-12-15 | Stop reason: HOSPADM

## 2023-12-13 RX ORDER — PREDNISONE 5 MG/1
5 TABLET ORAL DAILY
Status: DISCONTINUED | OUTPATIENT
Start: 2023-12-14 | End: 2023-12-15 | Stop reason: HOSPADM

## 2023-12-13 RX ORDER — ACETAMINOPHEN 325 MG/1
650 TABLET ORAL EVERY 6 HOURS PRN
Status: DISCONTINUED | OUTPATIENT
Start: 2023-12-13 | End: 2023-12-15 | Stop reason: HOSPADM

## 2023-12-13 RX ORDER — SODIUM CHLORIDE 9 MG/ML
INJECTION, SOLUTION INTRAVENOUS PRN
Status: DISCONTINUED | OUTPATIENT
Start: 2023-12-13 | End: 2023-12-15 | Stop reason: HOSPADM

## 2023-12-13 RX ORDER — OXYCODONE HYDROCHLORIDE AND ACETAMINOPHEN 5; 325 MG/1; MG/1
2 TABLET ORAL EVERY 4 HOURS PRN
Status: DISCONTINUED | OUTPATIENT
Start: 2023-12-13 | End: 2023-12-15 | Stop reason: HOSPADM

## 2023-12-13 RX ORDER — OXYCODONE HYDROCHLORIDE AND ACETAMINOPHEN 5; 325 MG/1; MG/1
1 TABLET ORAL EVERY 4 HOURS PRN
Status: DISCONTINUED | OUTPATIENT
Start: 2023-12-13 | End: 2023-12-15 | Stop reason: HOSPADM

## 2023-12-13 RX ORDER — HYDRALAZINE HYDROCHLORIDE 25 MG/1
25 TABLET, FILM COATED ORAL EVERY 8 HOURS SCHEDULED
Status: DISCONTINUED | OUTPATIENT
Start: 2023-12-13 | End: 2023-12-14

## 2023-12-13 RX ORDER — LEVOTHYROXINE SODIUM 0.07 MG/1
75 TABLET ORAL
Status: DISCONTINUED | OUTPATIENT
Start: 2023-12-14 | End: 2023-12-15 | Stop reason: HOSPADM

## 2023-12-13 RX ORDER — ACETAMINOPHEN 650 MG/1
650 SUPPOSITORY RECTAL EVERY 6 HOURS PRN
Status: DISCONTINUED | OUTPATIENT
Start: 2023-12-13 | End: 2023-12-15 | Stop reason: HOSPADM

## 2023-12-13 RX ORDER — EPINEPHRINE 0.3 MG/.3ML
INJECTION SUBCUTANEOUS
Qty: 2 EACH | Refills: 3 | Status: SHIPPED | OUTPATIENT
Start: 2023-12-13

## 2023-12-13 RX ORDER — HYDRALAZINE HYDROCHLORIDE 20 MG/ML
20 INJECTION INTRAMUSCULAR; INTRAVENOUS EVERY 6 HOURS PRN
Status: DISCONTINUED | OUTPATIENT
Start: 2023-12-13 | End: 2023-12-15 | Stop reason: HOSPADM

## 2023-12-13 RX ORDER — ALBUTEROL SULFATE 90 UG/1
2 AEROSOL, METERED RESPIRATORY (INHALATION) EVERY 4 HOURS PRN
Status: DISCONTINUED | OUTPATIENT
Start: 2023-12-13 | End: 2023-12-15 | Stop reason: HOSPADM

## 2023-12-13 RX ORDER — ALBUTEROL SULFATE 2.5 MG/3ML
2.5 SOLUTION RESPIRATORY (INHALATION) EVERY 4 HOURS PRN
Status: DISCONTINUED | OUTPATIENT
Start: 2023-12-13 | End: 2023-12-15 | Stop reason: HOSPADM

## 2023-12-13 RX ORDER — POLYETHYLENE GLYCOL 3350 17 G/17G
17 POWDER, FOR SOLUTION ORAL DAILY PRN
Status: DISCONTINUED | OUTPATIENT
Start: 2023-12-13 | End: 2023-12-15 | Stop reason: HOSPADM

## 2023-12-13 RX ORDER — ONDANSETRON 4 MG/1
4 TABLET, ORALLY DISINTEGRATING ORAL EVERY 8 HOURS PRN
Status: DISCONTINUED | OUTPATIENT
Start: 2023-12-13 | End: 2023-12-15 | Stop reason: HOSPADM

## 2023-12-13 RX ORDER — ONDANSETRON 2 MG/ML
4 INJECTION INTRAMUSCULAR; INTRAVENOUS EVERY 6 HOURS PRN
Status: DISCONTINUED | OUTPATIENT
Start: 2023-12-13 | End: 2023-12-15 | Stop reason: HOSPADM

## 2023-12-13 RX ORDER — FENTANYL CITRATE 50 UG/ML
50 INJECTION, SOLUTION INTRAMUSCULAR; INTRAVENOUS
Status: COMPLETED | OUTPATIENT
Start: 2023-12-13 | End: 2023-12-13

## 2023-12-13 RX ADMIN — Medication 2 PUFF: at 19:31

## 2023-12-13 RX ADMIN — SUCRALFATE 1 G: 1 TABLET ORAL at 23:48

## 2023-12-13 RX ADMIN — CARVEDILOL 6.25 MG: 6.25 TABLET, FILM COATED ORAL at 17:59

## 2023-12-13 RX ADMIN — ACETAMINOPHEN 650 MG: 325 TABLET ORAL at 17:58

## 2023-12-13 RX ADMIN — ONDANSETRON 4 MG: 2 INJECTION INTRAMUSCULAR; INTRAVENOUS at 17:59

## 2023-12-13 RX ADMIN — APIXABAN 2.5 MG: 2.5 TABLET, FILM COATED ORAL at 23:48

## 2023-12-13 RX ADMIN — ONDANSETRON 4 MG: 2 INJECTION INTRAMUSCULAR; INTRAVENOUS at 14:10

## 2023-12-13 RX ADMIN — OXYCODONE HYDROCHLORIDE AND ACETAMINOPHEN 1 TABLET: 5; 325 TABLET ORAL at 18:53

## 2023-12-13 RX ADMIN — HYDRALAZINE HYDROCHLORIDE 20 MG: 20 INJECTION, SOLUTION INTRAMUSCULAR; INTRAVENOUS at 18:04

## 2023-12-13 RX ADMIN — ONDANSETRON 4 MG: 4 TABLET, ORALLY DISINTEGRATING ORAL at 22:21

## 2023-12-13 RX ADMIN — FENTANYL CITRATE 50 MCG: 0.05 INJECTION, SOLUTION INTRAMUSCULAR; INTRAVENOUS at 14:11

## 2023-12-13 RX ADMIN — HYDRALAZINE HYDROCHLORIDE 25 MG: 25 TABLET, FILM COATED ORAL at 23:48

## 2023-12-13 RX ADMIN — Medication 2 PUFF: at 19:32

## 2023-12-13 ASSESSMENT — PAIN SCALES - GENERAL
PAINLEVEL_OUTOF10: 10
PAINLEVEL_OUTOF10: 8
PAINLEVEL_OUTOF10: 10
PAINLEVEL_OUTOF10: 0
PAINLEVEL_OUTOF10: 10
PAINLEVEL_OUTOF10: 0

## 2023-12-13 ASSESSMENT — PAIN DESCRIPTION - LOCATION
LOCATION: ARM
LOCATION: ABDOMEN
LOCATION: ABDOMEN
LOCATION: ABDOMEN;HEAD

## 2023-12-13 ASSESSMENT — PAIN DESCRIPTION - ORIENTATION: ORIENTATION: RIGHT

## 2023-12-13 ASSESSMENT — PAIN - FUNCTIONAL ASSESSMENT: PAIN_FUNCTIONAL_ASSESSMENT: 0-10

## 2023-12-13 NOTE — ED NOTES
TRANSFER - OUT REPORT:    Verbal report given to [address] [place] [address] on Fabricio Helm  being transferred to [address] [place] [address] for routine progression of patient care       Report consisted of patient's Situation, Background, Assessment and   Recommendations(SBAR). Information from the following report(s) ED Encounter Summary, ED SBAR, Intake/Output, MAR, and Cardiac Rhythm SR  was reviewed with the receiving nurse. Durant Fall Assessment:    Presents to emergency department  because of falls (Syncope, seizure, or loss of consciousness): No  Age > 70: No  Altered Mental Status, Intoxication with alcohol or substance confusion (Disorientation, impaired judgment, poor safety awaremess, or inability to follow instructions): No  Impaired Mobility: Ambulates or transfers with assistive devices or assistance; Unable to ambulate or transer.: Yes             Lines:   Peripheral IV 12/13/23 Left External Jugular (Active)   Site Assessment Clean, dry & intact 12/13/23 1405   Line Status Blood return noted 12/13/23 1405   Phlebitis Assessment No symptoms 12/13/23 1405   Infiltration Assessment 0 12/13/23 1405        Opportunity for questions and clarification was provided.       Patient transported with:  Monitor          Laurence Burrows, RN  12/13/23 8733

## 2023-12-13 NOTE — ED NOTES
Orthostatic v/s taken with MD at bedside:  Orthostatic vital signs as follows:  supine P 88, /86;  sitting P 90, /87;  standing: P 98, /89.        Deena Gillis RN  12/13/23 6080

## 2023-12-13 NOTE — ED TRIAGE NOTES
Patient arrives via EMS with c/o vomiting and malaise. She states abdominal pain. Patient evaluated and treated on 12/11/23 for gastroenteritis. States last dialysis was on Monday.

## 2023-12-13 NOTE — PROGRESS NOTES
TRANSFER - IN REPORT:    Verbal report received from MICHELLE Villasenor on [facility] being received from ED for routine progression of patient care      Report consisted of patient's Situation, Background, Assessment and   Recommendations(SBAR). Information from the following report(s) ED SBAR and Recent Results was reviewed with the receiving nurse. Opportunity for questions and clarification was provided. Assessment completed upon patient's arrival to unit and care assumed. 1635- Patient arrived to unit. 1656- Dual skin assessment completed with Kalin Tijerina RN. Skin intact. AV fistula noted to left upper extremity. Bruitt/thrill present.

## 2023-12-13 NOTE — H&P (VIEW-ONLY)
Gastroenterology Consult Note       Primary GI Physician: Mare Davidson MD    Referring Provider: Hospitalist team    Consult Date:  12/13/2023    Admit Date:  12/13/2023    Chief Complaint: Nausea and vomiting    Subjective:     History of Present Illness:  Patient is a 79 y.o. female who is seen in consultation for evaluation for nausea and vomiting. The history is from the patient, staff and medical records. The patient is well-known to me and was seen 9 months ago by me in the office. See office note from 5/26/2022 for details with similarities to her presentation today. The patient has multiple medical problems including failed kidney transplant, treated with hemodialysis, atrial flutter with rapid ventricular response, she is on amiodarone, hyponatremia, depression, and COPD. He was recently admitted for failure to thrive with nausea, inability to eat, vomiting, and just not feeling well. She was to be discharged, this was postponed because of the cardiac arrhythmia. She subsequently developed several episodes of ventricular tachycardia and hypoxemia requiring intubation and is presently sedated and on a ventilator. However, earlier today she was extubated and she is somnolent but awake and alert and able to answer some questions. She denies any nausea or vomiting at this time. She reportedly has been depressed and has chronic nausea and vomiting and gets hemodialysis every Monday, Wednesday, and Friday. When she was getting ready to be discharged her nausea and vomiting had resolved. Telemetry developed ventricular tachycardia and hypotension while she was being dialyzed. She reports she has had nausea and vomiting intermittently for years. The nursing staff states earlier this afternoon she did have some diarrhea with blood present and clots.     Pertinent labs show arterial blood gas of pH 7.56, PCO2 of 33, PO2 of 53, troponin 52, BUN/creatinine of 48 and 4.25, sodium 132, substantially less than shown previously. LIVER: No mass. BILIARY TREE: Cholecystectomy. CBD is not dilated. SPLEEN: within normal limits. PANCREAS: No focal abnormality. ADRENALS: Unremarkable. KIDNEYS/URETERS: Negative kidneys are severely atrophic. Several cysts are shown  largest in the left upper pole measuring 3.2 cm, unchanged. A right pelvic  transplant kidney is shown without evidence for pelvocaliectasis. Hypoattenuating lesion in the interpolar region is again noted measuring 1.6 cm,  with attenuation value consistent with cyst.  STOMACH: Unremarkable. SMALL BOWEL: No dilatation or wall thickening. COLON: Descending and sigmoid diverticulosis. . Mild pericolonic fat stranding  again shown at proximal ascending colon. APPENDIX: Normal.  PERITONEUM: No ascites or pneumoperitoneum. RETROPERITONEUM: No lymphadenopathy or aortic aneurysm. Abundant vascular  calcifications. REPRODUCTIVE ORGANS: Unremarkable. URINARY BLADDER: No mass or calculus. BONES: No destructive bone lesion. Mild to moderate diffuse osteopenia. ABDOMINAL WALL: No mass or hernia. ADDITIONAL COMMENTS: N/A    Impression  No acute abdominal or pelvic process demonstrated. Since prior study, there are  newly resolved pleural and pericardial effusions. MRI Result (most recent):  MRI BRAIN WO CONTRAST 07/03/2023    Narrative  INDICATION:   CVA    EXAMINATION:  MRI BRAIN WO CONTRAST    COMPARISON:  CT June 29, 2023    TECHNIQUE:  Multiplanar multisequence acquisition without contrast of the brain. FINDINGS:    Ventricles:  Midline, no hydrocephalus. Brain Parenchyma/Brainstem:  Mild patchy chronic T2 hyperintensities in the  supratentorial white matter and paloma. No acute infarction. Intracranial Hemorrhage:  Chronic microhemorrhage left temporoparietal junction. Basal Cisterns:  Normal.  Flow Voids:  Normal.  Additional Comments:  Partial empty sella turcica.     Impression  Mild chronic microvascular ischemic

## 2023-12-13 NOTE — H&P
Hospitalist Admission Note    NAME: Shaun Peñaloza   :  1955   MRN:  [id number]     Date/Time:  2023 6:02 PM    Patient PCP: Steve Mckeon MD  ______________________________________________________________________  Given the patient's current clinical presentation, I have a high level of concern for decompensation if discharged from the emergency department. Complex decision making was performed, which includes reviewing the patient's available past medical records, laboratory results, and x-ray films. My assessment of this patient's clinical condition and my plan of care is as follows. Assessment / Plan:  Acute inabiliity to keep any food down, on top of chronic nausea and vomiting  - admit, obs  - broad differential- incompletely treated esrd, may need more aggressive HD,  needs tighter BP control, polypharmacy may be playing a role, gastroparesis possible  - role of this admission, will be to stop all non essential meds, control bp, have egd done tomorrow  - discuss with GI, agree to dc gastric emptying study    HTN  - cont po coreg, hydralazine, iv prn hydralazine  - clonidine patch in case she cannot keep po meds down    ESRD  - consulted nephrology from ed for HD                Subjective:   CHIEF COMPLAINT: nsausea and vomiting, cannot keep any food down    HISTORY OF PRESENT ILLNESS:     Thor Lopez is a 79 y.o.  female with end-stage renal disease on hemodialysis, asthma, COPD, cancer, hypertension, hyperlipidemia and additional conditions noted in PMH. who presents with nausea and vomiting and abd pain. Patient evaluated and treated on 23 for gastroenteritis. States last dialysis was on Monday. She was recetly diagnosed with gastroenteritis. I doubt this is what she has, more likley gastroparesis, or n/v due to esrd, and or polypharmacy.  Will agree to admit for observation, to get htn under control, hold her non essential meds, and have GI consult - 13.2 K/uL    RBC 3.68 (L) 4.20 - 5.30 M/uL    Hemoglobin 11.7 (L) 12.0 - 16.0 g/dL    Hematocrit 35.2 35.0 - 45.0 %    MCV 95.7 78.0 - 100.0 FL    MCH 31.8 24.0 - 34.0 PG    MCHC 33.2 31.0 - 37.0 g/dL    RDW 13.6 11.6 - 14.5 %    Platelets 893 948 - 999 K/uL    MPV 9.1 (L) 9.2 - 11.8 FL    Nucleated RBCs 0.0 0.0  WBC    nRBC 0.00 0.00 - 0.01 K/uL    Neutrophils % 56 40 - 73 %    Lymphocytes % 26 21 - 52 %    Monocytes % 10 3 - 10 %    Eosinophils % 7 (H) 0 - 5 %    Basophils % 1 0 - 2 %    Immature Granulocytes 0 0 - 0.5 %    Neutrophils Absolute 4.0 1.8 - 8.0 K/UL    Lymphocytes Absolute 1.9 0.9 - 3.6 K/UL    Monocytes Absolute 0.7 0.05 - 1.2 K/UL    Eosinophils Absolute 0.5 (H) 0.0 - 0.4 K/UL    Basophils Absolute 0.1 0.0 - 0.1 K/UL    Absolute Immature Granulocyte 0.0 0.00 - 0.04 K/UL    Differential Type AUTOMATED     Comprehensive Metabolic Panel    Collection Time: 12/13/23  1:37 PM   Result Value Ref Range    Sodium 136 136 - 145 mmol/L    Potassium 4.0 3.5 - 5.5 mmol/L    Chloride 98 (L) 100 - 111 mmol/L    CO2 24 21 - 32 mmol/L    Anion Gap 14 3.0 - 18.0 mmol/L    Glucose 104 (H) 74 - 99 mg/dL    BUN 21 (H) 7 - 18 mg/dL    Creatinine 10.50 (H) 0.60 - 1.30 mg/dL    Bun/Cre Ratio 2 (L) 12 - 20      Est, Glom Filt Rate 4 (L) >60 ml/min/1.73m2    Calcium 8.7 8.5 - 10.1 mg/dL    Total Bilirubin 0.5 0.2 - 1.0 mg/dL    AST 31 10 - 38 U/L    ALT 33 13 - 56 U/L    Alk Phosphatase 240 (H) 45 - 117 U/L    Total Protein 7.1 6.4 - 8.2 g/dL    Albumin 3.6 3.4 - 5.0 g/dL    Globulin 3.5 2.0 - 4.0 g/dL    Albumin/Globulin Ratio 1.0 0.8 - 1.7     Lipase    Collection Time: 12/13/23  1:37 PM   Result Value Ref Range    Lipase 45 13 - 75 U/L   Troponin    Collection Time: 12/13/23  1:37 PM   Result Value Ref Range    Troponin, High Sensitivity 50 0 - 54 ng/L   Brain Natriuretic Peptide    Collection Time: 12/13/23  1:37 PM   Result Value Ref Range    NT Pro-BNP 41,916 (H) 0 - 900 pg/mL   Magnesium    Collection Time:

## 2023-12-13 NOTE — ED NOTES
Dr. Dusty Kelley speaking with Dr. Lisa Peacock related to patient.       Cezar Jackson RN  12/13/23 1989

## 2023-12-13 NOTE — PROGRESS NOTES
Informed that patient is a Tues Thursday Sat dialysis patient. Patient missed dialysis on Saturday but received dialysis on Monday 12/10. Patient goes to [facility] in [facility].  Update provided to dialysis nurse who will relay infromation to nephrologist.

## 2023-12-14 ENCOUNTER — ANESTHESIA (OUTPATIENT)
Age: 68
End: 2023-12-14
Payer: MEDICARE

## 2023-12-14 ENCOUNTER — ANESTHESIA EVENT (OUTPATIENT)
Age: 68
End: 2023-12-14
Payer: MEDICARE

## 2023-12-14 LAB
EKG ATRIAL RATE: 90 BPM
EKG DIAGNOSIS: NORMAL
EKG P AXIS: 52 DEGREES
EKG P-R INTERVAL: 200 MS
EKG Q-T INTERVAL: 404 MS
EKG QRS DURATION: 90 MS
EKG QTC CALCULATION (BAZETT): 494 MS
EKG R AXIS: 29 DEGREES
EKG T AXIS: 80 DEGREES
EKG VENTRICULAR RATE: 90 BPM
HBV SURFACE AB SER QL: REACTIVE
HBV SURFACE AB SER-ACNC: 472.78 MIU/ML
HBV SURFACE AG SER QL: <0.1 INDEX
HBV SURFACE AG SER QL: NEGATIVE

## 2023-12-14 PROCEDURE — 3700000000 HC ANESTHESIA ATTENDED CARE: Performed by: INTERNAL MEDICINE

## 2023-12-14 PROCEDURE — 2709999900 HC NON-CHARGEABLE SUPPLY: Performed by: INTERNAL MEDICINE

## 2023-12-14 PROCEDURE — C1726 CATH, BAL DIL, NON-VASCULAR: HCPCS | Performed by: INTERNAL MEDICINE

## 2023-12-14 PROCEDURE — 6370000000 HC RX 637 (ALT 250 FOR IP): Performed by: INTERNAL MEDICINE

## 2023-12-14 PROCEDURE — 88305 TISSUE EXAM BY PATHOLOGIST: CPT

## 2023-12-14 PROCEDURE — 6360000002 HC RX W HCPCS: Performed by: NURSE ANESTHETIST, CERTIFIED REGISTERED

## 2023-12-14 PROCEDURE — 3600007502: Performed by: INTERNAL MEDICINE

## 2023-12-14 PROCEDURE — 6360000002 HC RX W HCPCS: Performed by: NURSE PRACTITIONER

## 2023-12-14 PROCEDURE — 3600007512: Performed by: INTERNAL MEDICINE

## 2023-12-14 PROCEDURE — 2580000003 HC RX 258: Performed by: INTERNAL MEDICINE

## 2023-12-14 PROCEDURE — G0378 HOSPITAL OBSERVATION PER HR: HCPCS

## 2023-12-14 PROCEDURE — 94640 AIRWAY INHALATION TREATMENT: CPT

## 2023-12-14 PROCEDURE — 2580000003 HC RX 258: Performed by: NURSE ANESTHETIST, CERTIFIED REGISTERED

## 2023-12-14 PROCEDURE — 6370000000 HC RX 637 (ALT 250 FOR IP)

## 2023-12-14 PROCEDURE — 94761 N-INVAS EAR/PLS OXIMETRY MLT: CPT

## 2023-12-14 PROCEDURE — 3700000001 HC ADD 15 MINUTES (ANESTHESIA): Performed by: INTERNAL MEDICINE

## 2023-12-14 RX ORDER — HYDRALAZINE HYDROCHLORIDE 25 MG/1
50 TABLET, FILM COATED ORAL EVERY 8 HOURS SCHEDULED
Status: DISCONTINUED | OUTPATIENT
Start: 2023-12-14 | End: 2023-12-15 | Stop reason: HOSPADM

## 2023-12-14 RX ORDER — SODIUM CHLORIDE, SODIUM LACTATE, POTASSIUM CHLORIDE, CALCIUM CHLORIDE 600; 310; 30; 20 MG/100ML; MG/100ML; MG/100ML; MG/100ML
INJECTION, SOLUTION INTRAVENOUS CONTINUOUS
Status: CANCELLED | OUTPATIENT
Start: 2023-12-14

## 2023-12-14 RX ORDER — HYDROMORPHONE HYDROCHLORIDE 1 MG/ML
0.5 INJECTION, SOLUTION INTRAMUSCULAR; INTRAVENOUS; SUBCUTANEOUS ONCE
Status: COMPLETED | OUTPATIENT
Start: 2023-12-14 | End: 2023-12-14

## 2023-12-14 RX ORDER — SODIUM CHLORIDE 0.9 % (FLUSH) 0.9 %
5-40 SYRINGE (ML) INJECTION EVERY 12 HOURS SCHEDULED
Status: CANCELLED | OUTPATIENT
Start: 2023-12-14

## 2023-12-14 RX ORDER — SODIUM CHLORIDE, SODIUM LACTATE, POTASSIUM CHLORIDE, AND CALCIUM CHLORIDE .6; .31; .03; .02 G/100ML; G/100ML; G/100ML; G/100ML
INJECTION, SOLUTION INTRAVENOUS PRN
Status: DISCONTINUED | OUTPATIENT
Start: 2023-12-14 | End: 2023-12-14 | Stop reason: SDUPTHER

## 2023-12-14 RX ORDER — PROPOFOL 10 MG/ML
INJECTION, EMULSION INTRAVENOUS PRN
Status: DISCONTINUED | OUTPATIENT
Start: 2023-12-14 | End: 2023-12-14 | Stop reason: SDUPTHER

## 2023-12-14 RX ADMIN — PROPOFOL 50 MG: 10 INJECTION, EMULSION INTRAVENOUS at 14:19

## 2023-12-14 RX ADMIN — SODIUM CHLORIDE, PRESERVATIVE FREE 10 ML: 5 INJECTION INTRAVENOUS at 09:43

## 2023-12-14 RX ADMIN — Medication 2 PUFF: at 20:39

## 2023-12-14 RX ADMIN — PREDNISONE 5 MG: 5 TABLET ORAL at 09:42

## 2023-12-14 RX ADMIN — HYDRALAZINE HYDROCHLORIDE 25 MG: 25 TABLET, FILM COATED ORAL at 05:58

## 2023-12-14 RX ADMIN — CARVEDILOL 6.25 MG: 6.25 TABLET, FILM COATED ORAL at 17:08

## 2023-12-14 RX ADMIN — HYDRALAZINE HYDROCHLORIDE 50 MG: 25 TABLET, FILM COATED ORAL at 22:10

## 2023-12-14 RX ADMIN — SUCRALFATE 1 G: 1 TABLET ORAL at 11:39

## 2023-12-14 RX ADMIN — SODIUM CHLORIDE, PRESERVATIVE FREE 10 ML: 5 INJECTION INTRAVENOUS at 22:14

## 2023-12-14 RX ADMIN — HYDROMORPHONE HYDROCHLORIDE 0.5 MG: 1 INJECTION, SOLUTION INTRAMUSCULAR; INTRAVENOUS; SUBCUTANEOUS at 04:17

## 2023-12-14 RX ADMIN — CARVEDILOL 6.25 MG: 6.25 TABLET, FILM COATED ORAL at 09:41

## 2023-12-14 RX ADMIN — OXYCODONE HYDROCHLORIDE AND ACETAMINOPHEN 1 TABLET: 5; 325 TABLET ORAL at 19:40

## 2023-12-14 RX ADMIN — PANTOPRAZOLE SODIUM 40 MG: 40 TABLET, DELAYED RELEASE ORAL at 05:58

## 2023-12-14 RX ADMIN — APIXABAN 2.5 MG: 2.5 TABLET, FILM COATED ORAL at 09:41

## 2023-12-14 RX ADMIN — PROPOFOL 50 MG: 10 INJECTION, EMULSION INTRAVENOUS at 14:25

## 2023-12-14 RX ADMIN — SODIUM CHLORIDE, POTASSIUM CHLORIDE, SODIUM LACTATE AND CALCIUM CHLORIDE 50 ML: 600; 310; 30; 20 INJECTION, SOLUTION INTRAVENOUS at 13:55

## 2023-12-14 RX ADMIN — HYDRALAZINE HYDROCHLORIDE 25 MG: 25 TABLET, FILM COATED ORAL at 15:43

## 2023-12-14 RX ADMIN — SUCRALFATE 1 G: 1 TABLET ORAL at 05:58

## 2023-12-14 RX ADMIN — SODIUM CHLORIDE, POTASSIUM CHLORIDE, SODIUM LACTATE AND CALCIUM CHLORIDE 50 ML: 600; 310; 30; 20 INJECTION, SOLUTION INTRAVENOUS at 14:10

## 2023-12-14 RX ADMIN — SUCRALFATE 1 G: 1 TABLET ORAL at 22:10

## 2023-12-14 RX ADMIN — SUCRALFATE 1 G: 1 TABLET ORAL at 17:08

## 2023-12-14 RX ADMIN — LEVOTHYROXINE SODIUM 75 MCG: 0.07 TABLET ORAL at 05:58

## 2023-12-14 RX ADMIN — Medication 2 PUFF: at 07:35

## 2023-12-14 RX ADMIN — APIXABAN 2.5 MG: 2.5 TABLET, FILM COATED ORAL at 22:10

## 2023-12-14 RX ADMIN — OXYCODONE HYDROCHLORIDE AND ACETAMINOPHEN 2 TABLET: 5; 325 TABLET ORAL at 02:48

## 2023-12-14 RX ADMIN — PROPOFOL 80 MG: 10 INJECTION, EMULSION INTRAVENOUS at 14:10

## 2023-12-14 RX ADMIN — ONDANSETRON 4 MG: 4 TABLET, ORALLY DISINTEGRATING ORAL at 19:41

## 2023-12-14 ASSESSMENT — PAIN DESCRIPTION - LOCATION
LOCATION: ABDOMEN
LOCATION: SHOULDER

## 2023-12-14 ASSESSMENT — PAIN SCALES - GENERAL
PAINLEVEL_OUTOF10: 10
PAINLEVEL_OUTOF10: 3
PAINLEVEL_OUTOF10: 8
PAINLEVEL_OUTOF10: 4
PAINLEVEL_OUTOF10: 10
PAINLEVEL_OUTOF10: 7
PAINLEVEL_OUTOF10: 10

## 2023-12-14 ASSESSMENT — PAIN DESCRIPTION - DESCRIPTORS
DESCRIPTORS: PATIENT UNABLE TO DESCRIBE
DESCRIPTORS: SHARP
DESCRIPTORS: ACHING
DESCRIPTORS: SHARP
DESCRIPTORS: SHARP

## 2023-12-14 ASSESSMENT — PAIN DESCRIPTION - ORIENTATION: ORIENTATION: RIGHT

## 2023-12-14 ASSESSMENT — PAIN - FUNCTIONAL ASSESSMENT: PAIN_FUNCTIONAL_ASSESSMENT: NONE - DENIES PAIN

## 2023-12-14 NOTE — CONSULTS
Renal Consultation Note    NAME:  Usman Peters   :   1955   MRN:   [id number]     ATTENDING: Jaya Ashley MD  PCP:  Belle Calderón MD    Date/Time:  2023 3:22 PM      Subjective:   REQUESTING PHYSICIAN:  REASON FOR CONSULT:    ESRD patient. Need for dialysis    Arlin Grajeda is a 79 y.o.  female with end-stage renal disease on hemodialysis, asthma, COPD, cancer, hypertension, hyperlipidemia and additional conditions noted in PMH. who presents with nausea and vomiting and abd pain. Patient evaluated and treated on 23 for gastroenteritis. He was last dialyzed outpatient on Monday. Cannot recall who her nephrologist is. She was dialyzed last night and patient with 2 L fluid removal which she tolerated well patient seen in the room today.   She is lethargic postanesthesia    Past Medical History:   Diagnosis Date    JULIET (acute kidney injury) ([address]) 2019    Anxiety     Arrhythmia     Arthritis     Asthma     Asthma     Burning with urination     frequent uti    Calculus of gallbladder with acute cholecystitis without obstruction 10/09/2020    Cholecystitis 2019    Chronic kidney disease     dialysis    CMV (cytomegalovirus) antibody positive     Colitis 09/10/2022    COPD (chronic obstructive pulmonary disease) ([address])     Cystic kidney disease 2015    Dialysis patient [facility])     M/W/F    Diverticular disease of colon 2013    Dyspepsia and other specified disorders of function of stomach     stomach ulcer    Elevated troponin 10/21/2019    Encounter for cholecystectomy 2021    Essential hypertension     GERD (gastroesophageal reflux disease)     History of chemotherapy     History of renal transplant 2013    (LD 2002)    HLD (hyperlipidemia)     Hypercholesteremia     Hypertension     Hypothyroidism 2022    Irritable bowel syndrome     Kidney transplant rejection     Menopause     Migraine     Obesity

## 2023-12-14 NOTE — PROGRESS NOTES
Due to patients current isolation status pharmacy is unable to do a medication reconciliation.   Hien Burrows RN did do a medication reconciliation in the ED on 12/13 at 3:06pm.

## 2023-12-14 NOTE — FLOWSHEET NOTE
12/14/23 1144   Malnutrition Assessment   Context of Malnutrition Acute Illness   Acute Illness - Energy Intake  Unable to assess   Acute Illness - Weight Loss  Unable to assess   Acute Illness - Body Fat Loss Unable to assess   Acute Illness - Fluid Accumulation  7   Acute Illness - Malnutrition Score 7   Social/Environmental - Energy Intake  Unable to assess   Social/Environmental - Weight Loss  Unable to assess   Social/Environmental - Body Fat Loss Unable to assess   Social/Environmental - Muscle Mass Loss No significant muscle mass loss   Social/Environmental - Fluid Accumulation  7   Social/Environmental -  Strength Not Performed   Social/Environmental - Malnutrition Score 7   Malnutrition Status At risk for malnutrition (Comment)

## 2023-12-14 NOTE — INTERVAL H&P NOTE
Update History & Physical    The patient's History and Physical of December 14, 2023 was reviewed with the patient and I examined the patient. There was no change. The surgical site was confirmed by the patient and me. Plan: The risks, benefits, expected outcome, and alternative to the recommended procedure have been discussed with the patient. Patient understands and wants to proceed with the procedure.      Electronically signed by Rubén Zimmerman MD on 12/14/2023 at 12:40 PM

## 2023-12-14 NOTE — ANESTHESIA POSTPROCEDURE EVALUATION
Department of Anesthesiology  Postprocedure Note    Patient: Fortino Davis  MRN: [id number]  YOB: 1955  Date of evaluation: 12/14/2023    Procedure Summary       Date: 12/14/23 Room / Location: [facility] ENDO 01 / [facility] ENDOSCOPY    Anesthesia Start: 1344 Anesthesia Stop: 1431    Procedure: EGD ESOPHAGOGASTRODUODENOSCOPY (Esophagus) Diagnosis:       Nausea & vomiting      (Nausea & vomiting [R11.2])    Surgeons: Katelyn Paul MD Responsible Provider: LISA Hardin CRNA    Anesthesia Type: MAC ASA Status: 4            Anesthesia Type: MAC    Gabriel Phase I: Gabriel Score: 10    Gabriel Phase II:      Anesthesia Post Evaluation    Patient location during evaluation: bedside  Patient participation: complete - patient participated  Level of consciousness: awake and alert  Airway patency: patent  Nausea & Vomiting: no nausea and no vomiting  Cardiovascular status: hemodynamically stable and blood pressure returned to baseline  Respiratory status: acceptable and room air  Hydration status: euvolemic  Pain management: adequate    No notable events documented.

## 2023-12-14 NOTE — DISCHARGE SUMMARY
Discharge Summary       PATIENT ID: Spring Dunlap  MRN: [id number]   YOB: 1955    DATE OF ADMISSION: 12/13/2023  1:23 PM    DATE OF DISCHARGE: 12/14/23    PRIMARY CARE PROVIDER: Silvia Bhatia MD     ATTENDING PHYSICIAN: Josef Zuniga MD  DISCHARGING PROVIDER: Josef Zuniga MD        CONSULTATIONS: IP CONSULT TO CARDIOLOGY  IP CONSULT TO NEPHROLOGY  IP CONSULT TO GI    PROCEDURES/SURGERIES: Procedure(s):  EGD ESOPHAGOGASTRODUODENOSCOPY    ADMITTING DIAGNOSES & HOSPITAL COURSE:   Ulysses Billy is a 79 y.o.  female with end-stage renal disease on hemodialysis, asthma, COPD, cancer, hypertension, hyperlipidemia and additional conditions noted in PMH. who presents with nausea and vomiting and abd pain. Patient evaluated and treated on 12/11/23 for gastroenteritis. States last dialysis was on Monday. She was recetly diagnosed with gastroenteritis. I doubt this is what she has, more likley gastroparesis, or n/v due to esrd, and or polypharmacy. Will agree to admit for observation, to get htn under control, hold her non essential meds, and have GI consult and maybe even EGD. Thsi problem has been going on for years. Fentanyl in ed helped her pain and nausea. Tylenol doesn't help per pt. We were asked to admit for work up and evaluation of the above problems.          DISCHARGE DIAGNOSES / PLAN:      Assessment / Plan:  Acute inabiliity to keep any food down, on top of chronic nausea and vomiting  - admit, obs  - broad differential- incompletely treated esrd, may need more aggressive HD,  needs tighter BP control, polypharmacy may be playing a role, gastroparesis possible  - role of this admission, will be to stop all non essential meds, control bp, have egd done tomorrow  - discuss with GI, agree to dc gastric emptying study     HTN  - cont po coreg, hydralazine, iv prn hydralazine  - clonidine patch in case she cannot keep po meds down     ESRD  - consulted nephrology from ed for

## 2023-12-14 NOTE — PROGRESS NOTES
0700- Assumed care of the patient from off going nurse via bedside shift report. Patient resting in bed alert and oriented. Assessment complete. Request for heat compress for shoulder, provided. CBWR    0941- Administered scheduled medications    1139- Administered scheduled medication    3902- dietitian at bedside    1200- Patient out of room for procedure accompanied by pre op staff    1500- Patient back to room, resting in bed, alert.  CBWR    1543- Administered scheduled medications apple sauce provided    Patient c/o ABD pain, clear liquid diet ordered    1708- Administered scheduled medications- clear liquid dinner provided

## 2023-12-14 NOTE — OP NOTE
EGD procedure note    Date of procedure: 12/14/2023    Indication for procedure: Nausea and vomiting abdominal pain    Type of procedure: Upper endoscopy and biopsies of duodenum for celiac sprue and stomach for H. pylori; balloon dilatation with a 62 [place] balloon dilator. Anesthesia classification: ASA class 3    Patient history and physical has been accomplished and documented. Patient is assessed and determined to be an appropriate candidate for planned procedure and sedation. The patient was assessed immediately prior to sedation. Sedation plan: MAC per anesthesia. Surgical assistant: Not applicable    Airway assessment: Range of motion: normal, mouth opening: Normal, visual obstruction: No.    PREOP EXAM:  Unchanged. VS: Reviewed  Gen: WDWN in NAD  CV: RRR, no murmur  Resp: CTAB  Abd: Soft, NTND, +BS  Extrem: No cyanosis or edema  Neuro: Awake and alert      Informed consent obtained: Yes. The indications, risks, including but not limited to bleeding, perforation, infection, death, potential for failure to visualize or diagnose neoplasia, alternatives, benefits, discussed in detail with the patient prior to the procedure. Patient understands and agrees to the procedure. Patient identity and procedure were verified, consent was obtained, and is consistent with the consent form in the patient's records. INSTRUMENT: Olympus upper endoscope    PROCEDURE FINDINGS:    OROPHARYNX: Normal.  The oropharynx had no evidence of erythema or edema. ESOPHAGUS:  Esophageal mucosa normal with no masses noted in the proximal, mid, and distal esophagus. No endoscopic features of eosinophilic esophagitis were noted. The Z-line was at 39 cm. and was abnormal with a Schatzki's ring present. A 1-2 cm hiatal hernia was noted distally.     Balloon dilatation was done due to complaints of nausea and vomiting that I felt could be due to her Schatzki's ring, with a 57 [place] balloon dilator for 1 minute of Normal vision: sees adequately in most situations; can see medication labels, newsprint

## 2023-12-14 NOTE — FLOWSHEET NOTE
12/14/23 1152   Type and Reason for Visit   Type and Reason for Visit Initial   Nutrition Assessment   Nutrition Assessment Patient is a 78 yo [place] American female who presented to ED with c/o headache, nausea and vomiting. PMH signifcant for failed kidney transplant, treated with hemodialysis, atrial flutter with rapid ventricular response, hyponatremia, depression, and COPD. She was recently admitted for failure to thrive with nausea, inability to eat, vomiting, and just not feeling well. She was to be discharged, this was postponed because of the cardiac arrhythmia. She subsequently developed several episodes of ventricular tachycardia and hypoxemia requiring intubation. She is stable at time of note and scheduled for EGD, s/p diarrhea with blood present. Patient is NPO except sips of water with ice chips. Labs on 12/13/23 glucose 104, BUN/Cr 21/10.5, eGFR 4, Alk Phos 240. Estimated energy needs calculated below and recommend modified diet order s/p findings of EGD, along with nutrition education. Anthropometric Measures   Height 1.549 m (5' 1\")   Current Body Weight 68.9 kg (152 lb)   Weight Source Bed Scale   Ideal Body Weight (lbs) (Calculated) 105 lbs   Ideal Body Weight (Kg) (Calculated) 48 kg   % Ideal Body Weight (Calculated) 144.8 %   BMI (kg/m2) (Calculated) 28.7   Weight Adjustment For No Adjustment   BMI Categories Overweight (BMI 25.0-29. 9)   Wounds   Wound Type None   Nutrition Related Findings   Nutrition Related Findings NPO currently. Modified nutrition recommendations once diet advanced, s/p EGD.    Nutrient - Energy (Caloric) Requirements   Energy Requirements Based On Kcal/kg   Weight Used for Energy Requirements Adjusted   Weight for Energy Calculation (kg) 53 kg   Kcal/kg for Energy Calculation 30  (30-35)   Total Energy Requirements-Kcal/kg 1590 kcal   Total Energy Requirements (kcals/day) 1590-1855kcal/day   Nutrient - Protein Requirements   Weight Used for Protein Requirements

## 2023-12-14 NOTE — PLAN OF CARE
Problem: Chronic Conditions and Co-morbidities  Goal: Patient's chronic conditions and co-morbidity symptoms are monitored and maintained or improved  Outcome: Progressing  Flowsheets (Taken 12/13/2023 1944)  Care Plan - Patient's Chronic Conditions and Co-Morbidity Symptoms are Monitored and Maintained or Improved:   Monitor and assess patient's chronic conditions and comorbid symptoms for stability, deterioration, or improvement   Collaborate with multidisciplinary team to address chronic and comorbid conditions and prevent exacerbation or deterioration   Update acute care plan with appropriate goals if chronic or comorbid symptoms are exacerbated and prevent overall improvement and discharge     Problem: Discharge Planning  Goal: Discharge to home or other facility with appropriate resources  Outcome: Progressing  Flowsheets (Taken 12/13/2023 1944)  Discharge to home or other facility with appropriate resources: Identify discharge learning needs (meds, wound care, etc)     Problem: Pain  Goal: Verbalizes/displays adequate comfort level or baseline comfort level  Outcome: Progressing  Flowsheets (Taken 12/13/2023 2234)  Verbalizes/displays adequate comfort level or baseline comfort level:   Encourage patient to monitor pain and request assistance   Assess pain using appropriate pain scale   Administer analgesics based on type and severity of pain and evaluate response   Implement non-pharmacological measures as appropriate and evaluate response     Problem: Skin/Tissue Integrity  Goal: Absence of new skin breakdown  Description: 1. Monitor for areas of redness and/or skin breakdown  2.   Assess vascular access sites hourly  Outcome: Progressing     Problem: Safety - Adult  Goal: Free from fall injury  Outcome: Progressing

## 2023-12-14 NOTE — DIALYSIS
HEPATITIS::clinic results: surface antigen negative 11/2/23, surface antibodies immune 11/2/23    EDUCATION: importance of adherence to treatment schedule    PRE SBAR: Silverio Redman RN    POST SBAR: Silverio Redman RN    PROCEDURE: pre treatment, bruit and thrill noted avf, disinfect per P and P, cannulate 15 gg needles, no difficulty, rested and watched tv much of treatment, states abdominal discomfort since admission to hospital, declined additional treatments / meds during dialysis, until late treatment, requested something for nausea & zofran given by primary nurse/ post treatment, all possible blood returned / hemostasis / bruit and thrill noted avf / Dr Ashlyn Rivas notified of treatment details

## 2023-12-14 NOTE — PROGRESS NOTES
1900 - Assumed care of pt, shift report given    1957 - Assessment completed. Pt denies pain, discomfort, nausea or needs at this time. Pt taken to dialysis. 2221 - Received call from dialysis nurse that pt was c/o nausea. Pt has 2 hours until she can receive prn Zofran. Spoke to NP who gave telephone order for 4mg Zofran ODT x1. RBV. Medication given. 2350 - Pt returned from dialysis. Per NP, OK to give Eliquis with scheduled EGD tomorrow, HS medication given. Pt aware she is now NPO. Clean brief and pure wick placed on pt. Pt states she is still nauseated, aware it is too early for prn. Denies any further needs at this time. CBWR    0200 - Rounded o pt, resting with eyes closed. NAD noted. RR even and unlabored. 0250 - Pt called this nurse and stated she had 16-20/10 right shoulder pain, prn percocet 2 tabs given per orders. 0330 - VSS. Pt states pain remains unchanged    0400 - Pt called and requested the hospitalist be contacted stating \"my pain is a 20/10 and I can't take it\" NP made aware. Daughter called for update and states she will be here later this morning. 3058 - Dilaudid given per orders. Warm compress on shoulder    0445 - Pt states the medication/compress is helping \"a little\" pt states pain is a 12/10. Educated pt on pain scale and asked if 10 was the worst possible pain she could ever feel what would she rate it, pt continues to state pain is a 12/10.     0600 - Morning medication given with small sip of water, pt tolerated well. Pt states right shoulder pain is a true 7/10 at this time. Warm compress reapplied per pt request. Brief clean and dry. Pt denies any  further needs at this time.  CBWR

## 2023-12-14 NOTE — CONSULTS
CARDIOLOGY CONSULTATION    REASON FOR CONSULT: HFpEF    REQUESTING PROVIDER: Sophronia Lennox, MD     CHIEF COMPLAINT:  Abdominal pain and nausea     HISTORY OF PRESENT ILLNESS:  Jf Pardo is a 79y.o. year-old female with past medical history significant for ESRD on HD, cardiac arrest s/t polymorphic VT s/p [employer] Cobalt dual chamber ICD, HFpEF, paroxysmal atrial fibrillation, hypertension, hypothyroidism who was evaluated today due history of HFpEF. Patient admitted for evaluation of abdominal pain with nausea and vomiting. She has a planned EGD today. Patient denies chest pain, palpitations or dizziness. She endorses mild LAU that she reports is her baseline. Records from hospital admission course thus far reviewed. Telemetry reviewed. No acute events overnight. INPATIENT MEDICATIONS:  Home medications reviewed.     Current Facility-Administered Medications:     carvedilol (COREG) tablet 6.25 mg, 6.25 mg, Oral, BID WC, Sophronia Lennox, MD, 6.25 mg at 12/14/23 0941    pantoprazole (PROTONIX) tablet 40 mg, 40 mg, Oral, QAM AC, Sophronia Lennox, MD, 40 mg at 12/14/23 0558    apixaban (ELIQUIS) tablet 2.5 mg, 2.5 mg, Oral, BID, Sophronia Lennox, MD, 2.5 mg at 12/14/23 0941    hydrALAZINE (APRESOLINE) tablet 25 mg, 25 mg, Oral, 3 times per day, Sophronia Lennox, MD, 25 mg at 12/14/23 0558    levothyroxine (SYNTHROID) tablet 75 mcg, 75 mcg, Oral, QAM AC, Sophronia Lennox, MD, 75 mcg at 12/14/23 0558    sucralfate (CARAFATE) tablet 1 g, 1 g, Oral, 4x Daily AC & HS, Sophronia Lennox, MD, 1 g at 12/14/23 1139    predniSONE (DELTASONE) tablet 5 mg, 5 mg, Oral, Daily, Sophronia Lennox, MD, 5 mg at 12/14/23 0942    sodium chloride flush 0.9 % injection 5-40 mL, 5-40 mL, IntraVENous, 2 times per day, Sophronia Lennox, MD    sodium chloride flush 0.9 % injection 5-40 mL, 5-40 mL, IntraVENous, PRN, Sophronia Lennox, MD    0.9 % sodium chloride infusion, , IntraVENous, PRN, Sophronia Lennox, MD    ondansetron (ZOFRAN-ODT)

## 2023-12-14 NOTE — PROGRESS NOTES
Psych:             Not anxious or agitated. Neurologic:      Alert, moves all extremities, answers questions appropriately and responds to commands        Data Review:    Review and/or order of clinical lab test  Review and/or order of tests in the radiology section of CPT  Review and/or order of tests in the medicine section of CPT      Labs:     Recent Labs     12/13/23  1337   WBC 7.2   HGB 11.7*   HCT 35.2        Recent Labs     12/13/23  1337      K 4.0   CL 98*   CO2 24   BUN 21*   MG 2.6     Recent Labs     12/13/23  1337   ALT 33   GLOB 3.5     No results for input(s): \"INR\", \"APTT\" in the last 72 hours. Invalid input(s): \"PTP\"   No results for input(s): \"TIBC\", \"FERR\" in the last 72 hours. Invalid input(s): \"FE\", \"PSAT\"   No results found for: \"FOL\", \"RBCF\"   No results for input(s): \"PH\", \"PCO2\", \"PO2\" in the last 72 hours. No results for input(s): \"CPK\" in the last 72 hours.     Invalid input(s): \"CPKMB\", \"CKNDX\", \"TROIQ\"  Lab Results   Component Value Date/Time    CHOL 104 07/01/2023 12:02 PM    HDL 34 07/01/2023 12:02 PM     No results found for: \"GLUCPOC\"  [unfilled]      Medications Reviewed:     Current Facility-Administered Medications   Medication Dose Route Frequency    carvedilol (COREG) tablet 6.25 mg  6.25 mg Oral BID WC    pantoprazole (PROTONIX) tablet 40 mg  40 mg Oral QAM AC    apixaban (ELIQUIS) tablet 2.5 mg  2.5 mg Oral BID    hydrALAZINE (APRESOLINE) tablet 25 mg  25 mg Oral 3 times per day    levothyroxine (SYNTHROID) tablet 75 mcg  75 mcg Oral QAM AC    sucralfate (CARAFATE) tablet 1 g  1 g Oral 4x Daily AC & HS    predniSONE (DELTASONE) tablet 5 mg  5 mg Oral Daily    sodium chloride flush 0.9 % injection 5-40 mL  5-40 mL IntraVENous 2 times per day    sodium chloride flush 0.9 % injection 5-40 mL  5-40 mL IntraVENous PRN    0.9 % sodium chloride infusion   IntraVENous PRN    ondansetron (ZOFRAN-ODT) disintegrating tablet 4 mg  4 mg Oral Q8H PRN    Or ondansetron (ZOFRAN) injection 4 mg  4 mg IntraVENous Q6H PRN    polyethylene glycol (GLYCOLAX) packet 17 g  17 g Oral Daily PRN    acetaminophen (TYLENOL) tablet 650 mg  650 mg Oral Q6H PRN    Or    acetaminophen (TYLENOL) suppository 650 mg  650 mg Rectal Q6H PRN    cloNIDine (CATAPRES) 0.3 MG/24HR 1 patch  1 patch TransDERmal Weekly    sodium chloride flush 0.9 % injection 5-40 mL  5-40 mL IntraVENous 2 times per day    sodium chloride flush 0.9 % injection 5-40 mL  5-40 mL IntraVENous PRN    0.9 % sodium chloride infusion   IntraVENous PRN    hydrALAZINE (APRESOLINE) injection 20 mg  20 mg IntraVENous Q6H PRN    mometasone-formoterol (DULERA) 200-5 MCG/ACT inhaler 2 puff  2 puff Inhalation BID RT    And    tiotropium (SPIRIVA RESPIMAT) 2.5 MCG/ACT inhaler 2 puff  2 puff Inhalation Daily RT    oxyCODONE-acetaminophen (PERCOCET) 5-325 MG per tablet 1 tablet  1 tablet Oral Q4H PRN    Or    oxyCODONE-acetaminophen (PERCOCET) 5-325 MG per tablet 2 tablet  2 tablet Oral Q4H PRN    albuterol sulfate HFA (PROVENTIL;VENTOLIN;PROAIR) 108 (90 Base) MCG/ACT inhaler 2 puff  2 puff Inhalation Q4H PRN    albuterol (PROVENTIL) (2.5 MG/3ML) 0.083% nebulizer solution 2.5 mg  2.5 mg Nebulization Q4H PRN     Facility-Administered Medications Ordered in Other Encounters   Medication Dose Route Frequency    lactated ringers bolus bolus   IntraVENous PRN     ______________________________________________________________________  EXPECTED LENGTH OF STAY: 1  ACTUAL LENGTH OF STAY:          0                 Adela Quijano MD

## 2023-12-14 NOTE — FLOWSHEET NOTE
12/13/23 2000   Vital Signs   /68   Pulse 81   Technical Checks   All Connections Secure Yes   NS Bag Yes   Saline Line Double Clamped Yes   Dialyzer Revaclear 300   Prime Volume (mL) 200 mL   Treatment Initiation   Dialyze Hours 3   Treatment  Initiation Universal Precautions maintained;Lines secured to patient; Connections secured;Prime given;Venous Parameters set; Arterial Parameters set; Air foam detector engaged; Saline line double clamped;REV-300   During Hemodialysis Assessment   Blood Flow Rate (ml/min) 200 ml/min   Arterial Pressure (mmHg) -50 mmHg   Venous Pressure (mmHg) 50   TMP 40      Access Visible Yes   Ultrafiltration Rate (ml/hr) 1000 ml/hr   Dialysis Bath   K+ (Potassium) 2   Ca+ (Calcium) 2.5   Na+ (Sodium) 138   HCO3 (Bicarb) 37   Bicarbonate Concentrate Lot No. [id number]   Acid Concentrate Lot No. [id number]

## 2023-12-14 NOTE — CARE COORDINATION
Case Management Assessment  Initial Evaluation    Date/Time of Evaluation: 12/14/2023 11:45 AM  Assessment Completed by: Benjamín Dhaliwal RN    If patient is discharged prior to next notation, then this note serves as note for discharge by case management. Patient Name: Usman Peters                   YOB: 1955  Diagnosis: Dyspnea on exertion [R06.09]  Nausea & vomiting [R11.2]  Chronic abdominal pain [R10.9, G89.29]  Volume overload [E87.70]  Hypervolemia, unspecified hypervolemia type [E87.70]  Fatigue, unspecified type [R53.83]  Nausea and vomiting, unspecified vomiting type [R11.2]                   Date / Time: 12/13/2023  1:23 PM    Patient Admission Status: Observation   Readmission Risk (Low < 19, Mod (19-27), High > 27): Readmission Risk Score: 24.6    Current PCP: Belle Calderón MD  PCP verified by CM? Yes    Chart Reviewed: Yes      History Provided by: Patient  Patient Orientation: Alert and Oriented    Patient Cognition: Alert    Hospitalization in the last 30 days (Readmission):  No    If yes, Readmission Assessment in CM Navigator will be completed. Advance Directives:      Code Status: Full Code   Patient's Primary Decision Maker is: Legal Next of Kin    Primary Decision MakeJamie Sebastian - [phone]    Discharge Planning:    Patient lives with: (P) Family Members Type of Home: (P) House  Primary Care Giver: Self  Patient Support Systems include: Children, Family Members   Current Financial resources: Medicare  Current community resources: None  Current services prior to admission: (P) None            Current DME:              Type of Home Care services:  (P) None    ADLS  Prior functional level: Independent in ADLs/IADLs  Current functional level: Independent in ADLs/IADLs    PT AM-PAC:   /24  OT AM-PAC:   /24    Family can provide assistance at DC:  Yes  Would you like Case Management to discuss the discharge plan with any other family members/significant others,

## 2023-12-14 NOTE — PLAN OF CARE
Problem: Chronic Conditions and Co-morbidities  Goal: Patient's chronic conditions and co-morbidity symptoms are monitored and maintained or improved  12/14/2023 0801 by Pura Akins RN  Outcome: Progressing  12/13/2023 2234 by Devan Harrison LPN  Outcome: Progressing  Flowsheets (Taken 12/13/2023 1944)  Care Plan - Patient's Chronic Conditions and Co-Morbidity Symptoms are Monitored and Maintained or Improved:   Monitor and assess patient's chronic conditions and comorbid symptoms for stability, deterioration, or improvement   Collaborate with multidisciplinary team to address chronic and comorbid conditions and prevent exacerbation or deterioration   Update acute care plan with appropriate goals if chronic or comorbid symptoms are exacerbated and prevent overall improvement and discharge     Problem: Discharge Planning  Goal: Discharge to home or other facility with appropriate resources  12/14/2023 0801 by Pura Akins RN  Outcome: Progressing  12/13/2023 2234 by Devan Harrison LPN  Outcome: Progressing  Flowsheets (Taken 12/13/2023 1944)  Discharge to home or other facility with appropriate resources: Identify discharge learning needs (meds, wound care, etc)     Problem: Pain  Goal: Verbalizes/displays adequate comfort level or baseline comfort level  12/14/2023 0801 by Pura Akins RN  Outcome: Progressing  12/13/2023 2234 by Devan Harrison LPN  Outcome: Progressing  Flowsheets (Taken 12/13/2023 2234)  Verbalizes/displays adequate comfort level or baseline comfort level:   Encourage patient to monitor pain and request assistance   Assess pain using appropriate pain scale   Administer analgesics based on type and severity of pain and evaluate response   Implement non-pharmacological measures as appropriate and evaluate response     Problem: Skin/Tissue Integrity  Goal: Absence of new skin breakdown  Description: 1.   Monitor for areas of redness and/or skin

## 2023-12-15 VITALS
WEIGHT: 150 LBS | DIASTOLIC BLOOD PRESSURE: 67 MMHG | HEIGHT: 61 IN | BODY MASS INDEX: 28.32 KG/M2 | TEMPERATURE: 98 F | RESPIRATION RATE: 18 BRPM | SYSTOLIC BLOOD PRESSURE: 165 MMHG | OXYGEN SATURATION: 99 % | HEART RATE: 93 BPM

## 2023-12-15 LAB
BACTERIA SPEC CULT: NORMAL
EKG ATRIAL RATE: 91 BPM
EKG DIAGNOSIS: NORMAL
EKG P AXIS: 57 DEGREES
EKG P-R INTERVAL: 190 MS
EKG Q-T INTERVAL: 380 MS
EKG QRS DURATION: 86 MS
EKG QTC CALCULATION (BAZETT): 467 MS
EKG R AXIS: 36 DEGREES
EKG T AXIS: 108 DEGREES
EKG VENTRICULAR RATE: 91 BPM
Lab: NORMAL

## 2023-12-15 PROCEDURE — 6370000000 HC RX 637 (ALT 250 FOR IP): Performed by: INTERNAL MEDICINE

## 2023-12-15 PROCEDURE — 94761 N-INVAS EAR/PLS OXIMETRY MLT: CPT

## 2023-12-15 PROCEDURE — 2580000003 HC RX 258: Performed by: INTERNAL MEDICINE

## 2023-12-15 PROCEDURE — 94640 AIRWAY INHALATION TREATMENT: CPT

## 2023-12-15 PROCEDURE — 6370000000 HC RX 637 (ALT 250 FOR IP)

## 2023-12-15 PROCEDURE — 93005 ELECTROCARDIOGRAM TRACING: CPT

## 2023-12-15 PROCEDURE — G0378 HOSPITAL OBSERVATION PER HR: HCPCS

## 2023-12-15 RX ADMIN — Medication 2 PUFF: at 07:38

## 2023-12-15 RX ADMIN — Medication 10 ML: at 09:22

## 2023-12-15 RX ADMIN — Medication 2 PUFF: at 07:39

## 2023-12-15 RX ADMIN — SUCRALFATE 1 G: 1 TABLET ORAL at 11:46

## 2023-12-15 RX ADMIN — SODIUM CHLORIDE, PRESERVATIVE FREE 10 ML: 5 INJECTION INTRAVENOUS at 09:21

## 2023-12-15 RX ADMIN — PREDNISONE 5 MG: 5 TABLET ORAL at 09:19

## 2023-12-15 RX ADMIN — LEVOTHYROXINE SODIUM 75 MCG: 0.07 TABLET ORAL at 06:23

## 2023-12-15 RX ADMIN — PANTOPRAZOLE SODIUM 40 MG: 40 TABLET, DELAYED RELEASE ORAL at 06:23

## 2023-12-15 RX ADMIN — HYDRALAZINE HYDROCHLORIDE 50 MG: 25 TABLET, FILM COATED ORAL at 06:23

## 2023-12-15 RX ADMIN — SUCRALFATE 1 G: 1 TABLET ORAL at 06:23

## 2023-12-15 RX ADMIN — APIXABAN 2.5 MG: 2.5 TABLET, FILM COATED ORAL at 09:18

## 2023-12-15 RX ADMIN — OXYCODONE HYDROCHLORIDE AND ACETAMINOPHEN 1 TABLET: 5; 325 TABLET ORAL at 09:19

## 2023-12-15 ASSESSMENT — PAIN SCALES - GENERAL
PAINLEVEL_OUTOF10: 4
PAINLEVEL_OUTOF10: 7

## 2023-12-15 ASSESSMENT — PAIN DESCRIPTION - ORIENTATION: ORIENTATION: RIGHT

## 2023-12-15 ASSESSMENT — PAIN DESCRIPTION - LOCATION: LOCATION: SHOULDER

## 2023-12-15 ASSESSMENT — PAIN DESCRIPTION - DESCRIPTORS: DESCRIPTORS: ACHING;NAGGING;SHARP

## 2023-12-15 NOTE — PROGRESS NOTES
Admission medications    Medication Sig Start Date End Date Taking?  Authorizing Provider   EPINEPHrine (EPIPEN) 0.3 MG/0.3ML SOAJ injection INJECT 0.3 ML INTO THE MUSCLE ONCE AS NEEDED FOR ALLERGIC RESPONSE 12/13/23   Tho Michele MD   ondansetron (ZOFRAN) 4 MG tablet Take 1 tablet by mouth 3 times daily as needed for Nausea or Vomiting 12/10/23   Irvin Masters DO   meclizine (ANTIVERT) 25 MG tablet TAKE 1 TABLET THREE TIMES DAILY AS NEEDED FOR DIZZINESS 11/8/23   Tho Michele MD   Dextromethorphan-GG-APAP (CORICIDIN HBP COLD/COUGH/FLU) -076 MG/15ML LIQD Take 30 mLs by mouth every 8 hours as needed (cough) 10/26/23   Tho Michele MD   carvedilol (COREG) 6.25 MG tablet TAKE 1 TABLET BY MOUTH TWICE DAILY ON DAYS OF DIALYSIS 10/25/23   Luis RAMIREZ MD   Capsaicin 0.1 % CREA Apply 1 g topically at bedtime 10/18/23   Tho Michele MD   dexlansoprazole (DEXILANT) 60 MG CPDR delayed release capsule Take 1 capsule by mouth every morning 10/9/23   Tho Michele MD   montelukast (SINGULAIR) 10 MG tablet TAKE 1 TABLET EVERY MORNING 9/25/23   Tho Michele MD   dicyclomine (BENTYL) 10 MG capsule TAKE 1 CAPSULE EVERY MORNING 9/25/23   Tho Michele MD   ELIQUIS 2.5 MG TABS tablet TAKE 1 TABLET TWICE DAILY 9/25/23   Tho Michele MD   simvastatin (ZOCOR) 20 MG tablet TAKE 1 TABLET EVERY DAY AS DIRECTED 9/25/23   Tho Michele MD   levothyroxine (SYNTHROID) 75 MCG tablet TAKE 1 TABLET EVERY DAY BEFORE BREAKFAST 9/25/23   Tho Michele MD   TRELEGY ELLIPTA 879-25.7-25 MCG/ACT AEPB inhaler INHALE 1 PUFF EVERY DAY 9/18/23   Tho Michele MD   fluticasone (FLONASE) 50 MCG/ACT nasal spray USE 1 SPRAY IN EACH NOSTRIL EVERY MORNING 8/23/23   Tho Michele MD   hydrALAZINE (APRESOLINE) 25 MG tablet Take 1 tablet by mouth 3 times daily as needed (hypertension >160/100) 8/21/23   Tho Michele MD   cinacalcet (SENSIPAR) 30 MG tablet focals  Psychiatric: non-depressed          LAB DATA REVIEWED:      Recent Labs     12/13/23  1337 12/10/23  1828   WBC 7.2 5.9   HGB 11.7* 10.6*   HCT 35.2 32.0*    164     Recent Labs     12/13/23  1337 12/10/23  1828    135*   K 4.0 4.6   CL 98* 98*   CO2 24 24   BUN 21* 42*   CREATININE 10.50* 11.10*   MG 2.6  --    ALT 33  --      Invalid input(s): \"GLPOC\"  No results for input(s): \"PH\", \"PCO2\", \"PO2\", \"HCO3\", \"FIO2\" in the last 720 hours. No results for input(s): \"INR\", \"INREXT\" in the last 720 hours. Recommendations/Plan:     #1 end-stage renal disease  -Dialysis every MWF. -Patient does not know her nephrologist or [address] [place] [address]. Dialyzed inpatient 12/13  for 3.5 hours with 2 L fluid removal  -she will be dialyzed later today  -She can be discharged home after hd from renal standpoint. Her electrolytes were okay yesterday    #2 hypertension  -Blood pressure is well-controlled with current medications which I will continue    #3 renal osteodystrophy  -Calcium is okay. Check phosphorus with next labs    #4 anemia  -hb 11.7. No need for Procrit with dialysis    #5 nausea vomiting and abdominal pain  -He underwent upper endoscopy and dilation of Shaztki ring 12/14 .   GI is on the case.        ________________________________________________________________________  Signed: Reji Eli MD

## 2023-12-15 NOTE — DIALYSIS
HEPATITIS: clinic results : 11/2/23 surface antigen negative and surface antibodies immune    EDUCATION: importance of adherence to treatment schedule    PRE SBAR: with KELLY Tinoco RN    POST SBAR: with KELLY Tinoco RN    PROCEDURE: pre treatment bruit and thrill noted avf / disinfect per P and P / cannulate 15 gg needles without difficulty / watched tv and rested much of treatment / no pain, no distress, no discomfort, no SOB / post treatment, all possible blood returned , hemostasis / bruit and thrill noted avf

## 2023-12-15 NOTE — PLAN OF CARE
Problem: Chronic Conditions and Co-morbidities  Goal: Patient's chronic conditions and co-morbidity symptoms are monitored and maintained or improved  Outcome: Completed     Problem: Discharge Planning  Goal: Discharge to home or other facility with appropriate resources  Outcome: Completed     Problem: Pain  Goal: Verbalizes/displays adequate comfort level or baseline comfort level  Outcome: Completed     Problem: Skin/Tissue Integrity  Goal: Absence of new skin breakdown  Description: 1. Monitor for areas of redness and/or skin breakdown  2. Assess vascular access sites hourly  3. Every 4-6 hours minimum:  Change oxygen saturation probe site  4. Every 4-6 hours:  If on nasal continuous positive airway pressure, respiratory therapy assess nares and determine need for appliance change or resting period.   Outcome: Completed     Problem: Safety - Adult  Goal: Free from fall injury  Outcome: Completed     Problem: ABCDS Injury Assessment  Goal: Absence of physical injury  Outcome: Completed

## 2023-12-15 NOTE — DISCHARGE SUMMARY
Discharge Summary       PATIENT ID: Radha Mchugh  MRN: [id number]   YOB: 1955    DATE OF ADMISSION: 12/13/2023  1:23 PM    DATE OF DISCHARGE: 12/15/23    PRIMARY CARE PROVIDER: Verona Godinez MD     ATTENDING PHYSICIAN: Dwaine Cranker, MD  DISCHARGING PROVIDER: Dwaine Cranker, MD        CONSULTATIONS: IP CONSULT TO CARDIOLOGY  IP CONSULT TO NEPHROLOGY  IP CONSULT TO GI    PROCEDURES/SURGERIES: Procedure(s):  EGD ESOPHAGOGASTRODUODENOSCOPY    ADMITTING DIAGNOSES & HOSPITAL COURSE:   Nathalie Jackson is a 79 y.o.  female with end-stage renal disease on hemodialysis, asthma, COPD, cancer, hypertension, hyperlipidemia and additional conditions noted in PMH. who presents with nausea and vomiting and abd pain. Patient evaluated and treated on 12/11/23 for gastroenteritis. States last dialysis was on Monday. She was recetly diagnosed with gastroenteritis. I doubt this is what she has, more likley gastroparesis, or n/v due to esrd, and or polypharmacy. Will agree to admit for observation, to get htn under control, hold her non essential meds, and have GI consult and maybe even EGD. Thsi problem has been going on for years. Fentanyl in ed helped her pain and nausea. Tylenol doesn't help per pt. We were asked to admit for work up and evaluation of the above problems.          DISCHARGE DIAGNOSES / PLAN:      Assessment / Plan:  Acute inabiliity to keep any food down, on top of chronic nausea and vomiting  - admit, obs  - broad differential- incompletely treated esrd, may need more aggressive HD,  needs tighter BP control, polypharmacy may be playing a role, gastroparesis possible  - role of this admission, will be to stop all non essential meds, control bp, have egd done tomorrow  - discuss with GI, agree to dc gastric emptying study     HTN  - cont po coreg, hydralazine, iv prn hydralazine  - clonidine patch in case she cannot keep po meds down     ESRD  - consulted nephrology from ed for

## 2023-12-15 NOTE — PROGRESS NOTES
0700 - Assumed care of patient. 1392 - Assessment completed. Patient laying in bed alert and oriented x4. VSS. She denied N/V this morning. Patient aware scheduled for dialysis today. No needs voiced. 0226 - Scheduled meds given. Percocet given for pain. Will continue to monitor. 2637 - Patient reported improved pain. 1230 - Dietary called for lunch tray. 1420 - Patient taken to dialysis via bed by this nurse. Extra blankets given. 1805 - Patient returned to room via bed by this nurse and Angelica Collier Dialysis nurse. Patient immediately started getting dressed for discharge. Daughter at bedside for discharge. 1830 - EJ removed, no bleeding noted. Discharge paperwork reviewed. Follow up appointment made, patient aware. Denied questions. Patient taken to ER exit for discharge via wheelchair by this nurse.

## 2023-12-15 NOTE — PROGRESS NOTES
1900 - Received bedside shift report completed on pt in room 241. Assumed care of pt.     1940 - Rounded on pt, pt resting in bed, c/o broth from kitchen. Pt c/o all over abdominal pain, rated 8/10, PRN Percocet offered and accepted with Zofran for c/o nausea. Pt tolerated well. No other needs expressed at this time. CBWR.    2100 - Pt washing self up by sitting on bedside chair. Provided with all needed supplies and completed linen change done. 2210 - HS medications administered to pt, no other needs expressed at this time. Pt interested in advancing diet. CBWR.     0030 - Rounded on pt, pt resting in bed quietly, no needs expressed at this time. CBWR.     0335 - Rounded on pt, pt resting in bed, no needs expressed at this time. CBWR.     8910 - AC and scheduled medications administered and coffee provided to pt. No other needs expressed by pt at this time.  CBWR

## 2023-12-19 NOTE — ED PROVIDER NOTES
(ZOFRAN) 4 MG tablet Take 1 tablet by mouth 3 times daily as needed for Nausea or Vomiting, Disp-15 tablet, R-0Normal      meclizine (ANTIVERT) 25 MG tablet TAKE 1 TABLET THREE TIMES DAILY AS NEEDED FOR DIZZINESS, Disp-90 tablet, R-10Normal      Dextromethorphan-GG-APAP (CORICIDIN HBP COLD/COUGH/FLU) -325 MG/15ML LIQD Take 30 mLs by mouth every 8 hours as needed (cough), Disp-355 mL, R-0Normal      carvedilol (COREG) 6.25 MG tablet TAKE 1 TABLET BY MOUTH TWICE DAILY ON DAYS OF DIALYSIS, Disp-60 tablet, R-2Normal      Capsaicin 0.1 % CREA Apply 1 g topically at bedtime, Disp-60 g, R-1Normal      dexlansoprazole (DEXILANT) 60 MG CPDR delayed release capsule Take 1 capsule by mouth every morning, Disp-90 capsule, R-2Normal      montelukast (SINGULAIR) 10 MG tablet TAKE 1 TABLET EVERY MORNING, Disp-90 tablet, R-3Normal      dicyclomine (BENTYL) 10 MG capsule TAKE 1 CAPSULE EVERY MORNING, Disp-90 capsule, R-3Normal      ELIQUIS 2.5 MG TABS tablet TAKE 1 TABLET TWICE DAILY, Disp-180 tablet, R-3Normal      simvastatin (ZOCOR) 20 MG tablet TAKE 1 TABLET EVERY DAY AS DIRECTED, Disp-90 tablet, R-3Normal      levothyroxine (SYNTHROID) 75 MCG tablet TAKE 1 TABLET EVERY DAY BEFORE BREAKFAST, Disp-90 tablet, R-3Normal      TRELEGY ELLIPTA 100-62.5-25 MCG/ACT AEPB inhaler INHALE 1 PUFF EVERY DAY, Disp-180 each, R-0Normal      fluticasone (FLONASE) 50 MCG/ACT nasal spray USE 1 SPRAY IN EACH NOSTRIL EVERY MORNING, Disp-32 g, R-0Normal      hydrALAZINE (APRESOLINE) 25 MG tablet Take 1 tablet by mouth 3 times daily as needed (hypertension >160/100), Disp-270 tablet, R-3Normal      cinacalcet (SENSIPAR) 30 MG tablet Historical Med      predniSONE (DELTASONE) 5 MG tablet Take 1 tablet by mouth daily, Disp-90 tablet, R-3Normal      sevelamer (RENVELA) 800 MG tablet TAKE 1 TABLET BY MOUTH THREE TIMES A DAY WITH MEALSHistorical Med      Multiple Vitamins-Minerals (RENAPLEX-D) TABS Take 1 tablet by mouth dailyHistorical Med

## 2023-12-26 ENCOUNTER — CARE COORDINATION (OUTPATIENT)
Facility: CLINIC | Age: 68
End: 2023-12-26

## 2023-12-26 ENCOUNTER — TELEPHONE (OUTPATIENT)
Facility: CLINIC | Age: 68
End: 2023-12-26

## 2023-12-26 NOTE — CARE COORDINATION
Care Transitions Follow Up Call    Care Transition Nurse contacted the patient by telephone to follow up after admission on 12/15/23. Verified name and  with patient as identifiers. Patient: Rita Marshall  Patient : 1955   MRN: [id number]  Reason for Admission: nausea and vomiting and abd . Discharge Date: 12/15/23 RARS: Readmission Risk Score: 24.6      Needs to be reviewed by the provider   Additional needs identified to be addressed with provider: Yes    Migraine not relief by tylenol               Method of communication with provider: phone. Patient reports that she is feeling so so today. Offer supports and assistance , Pt. States that she will be okay. Pt. States that she has migraines all day today and Tylenol is not helping. Pt. Denied head injury. Pt. Is aware of PCP appt. Tomorrow. No questions, concerns and/or needs at this time as per Pt. Pt. Kept the conversation short. Patient reminded that there is a physician on call 24 hours a day / 7 days a week (M-F 5pm to 8am and from Friday 5pm until Monday 8a for the weekend) should the patient have questions or concerns. Patient reminded to call 911 if situation is emergent ( such as chest pain, shortness of breath, unstoppable bleeding, feeling of passing out,  worsening of symptoms)or patient feels the situation is emergent. Pt verbalizes understanding. CTN called and Spoke with Ms. Kwok of [address] [place] [address] and informed of patient c/o migraine. Ms. Allison Foster states that she will send a message to Pt. Provider. I appreciate assistance.      Follow Up  Future Appointments   Date Time Provider [address] [place] [address] Ct   2023  1:00 PM LISA Ferraro NP [facility] BS AMB   2024  3:00 PM Celina Rider MD [facility] BS AMB       Care Transition Nurse reviewed discharge instructions, medical action plan, and red flags with patient and discussed any barriers to care and/or understanding of plan of care after

## 2023-12-27 ENCOUNTER — TELEPHONE (OUTPATIENT)
Facility: CLINIC | Age: 68
End: 2023-12-27

## 2023-12-27 ENCOUNTER — OFFICE VISIT (OUTPATIENT)
Facility: CLINIC | Age: 68
End: 2023-12-27

## 2023-12-27 VITALS — SYSTOLIC BLOOD PRESSURE: 145 MMHG | DIASTOLIC BLOOD PRESSURE: 80 MMHG

## 2023-12-27 DIAGNOSIS — R30.0 DYSURIA: ICD-10-CM

## 2023-12-27 DIAGNOSIS — Z09 HOSPITAL DISCHARGE FOLLOW-UP: Primary | ICD-10-CM

## 2023-12-27 DIAGNOSIS — I10 ESSENTIAL HYPERTENSION: ICD-10-CM

## 2023-12-27 DIAGNOSIS — T36.95XA ANTIBIOTIC-INDUCED YEAST INFECTION: ICD-10-CM

## 2023-12-27 DIAGNOSIS — R82.998 LEUKOCYTES IN URINE: ICD-10-CM

## 2023-12-27 DIAGNOSIS — B37.9 ANTIBIOTIC-INDUCED YEAST INFECTION: ICD-10-CM

## 2023-12-27 LAB
BILIRUBIN, URINE, POC: NEGATIVE
BLOOD URINE, POC: ABNORMAL
GLUCOSE URINE, POC: NEGATIVE
KETONES, URINE, POC: NEGATIVE
LEUKOCYTE ESTERASE, URINE, POC: ABNORMAL
NITRITE, URINE, POC: NEGATIVE
PH, URINE, POC: 8.5 (ref 4.6–8)
PROTEIN,URINE, POC: ABNORMAL
SPECIFIC GRAVITY, URINE, POC: 1.02 (ref 1–1.03)
URINALYSIS CLARITY, POC: ABNORMAL
URINALYSIS COLOR, POC: YELLOW
UROBILINOGEN, POC: ABNORMAL

## 2023-12-27 RX ORDER — FLUCONAZOLE 150 MG/1
150 TABLET ORAL ONCE
Qty: 1 TABLET | Refills: 0 | Status: SHIPPED | OUTPATIENT
Start: 2023-12-27 | End: 2023-12-27

## 2023-12-27 RX ORDER — CIPROFLOXACIN 750 MG/1
750 TABLET, FILM COATED ORAL DAILY
Qty: 10 TABLET | Refills: 0 | Status: SHIPPED | OUTPATIENT
Start: 2023-12-27 | End: 2024-01-06

## 2023-12-27 NOTE — PROGRESS NOTES
Reynaldo Alex presents today for   Chief Complaint   Patient presents with    Follow-Up from [address] [place] [address] 12/13/23-12/15/23       Is someone accompanying this pt? no    Is the patient using any DME equipment during [address]? no    Health Maintenance reviewed and discussed and ordered per Provider. Health Maintenance Due   Topic Date Due    DTaP/Tdap/Td vaccine (1 - Tdap) Never done    Respiratory Syncytial Virus (RSV) Pregnant or age 61 yrs+ (1 - 1-dose 60+ series) Never done    Shingles vaccine (2 of 2) 04/04/2023    Flu vaccine (1) 08/01/2023    COVID-19 Vaccine (5 - 2023-24 season) 09/01/2023   . Coordination of Care:  1. \"Have you been to the ER, urgent care clinic since your last visit? Hospitalized since your last visit? \" Yes    2. \"Have you seen or consulted any other health care providers outside of the [address] [place] [address] since your last visit? \" No    3. For patients aged 43-73: Has the patient had a colonoscopy? Yes- No care gap present    If the patient is female:    4. For patients aged 43-66: Has the patient had a mammogram within the past 2 years? Yes- No care gap present    5. For patients aged 21-65: Has the patient had a pap smear?  N/A based on age/sex

## 2023-12-28 VITALS
SYSTOLIC BLOOD PRESSURE: 145 MMHG | DIASTOLIC BLOOD PRESSURE: 80 MMHG | HEART RATE: 89 BPM | OXYGEN SATURATION: 99 % | BODY MASS INDEX: 28.58 KG/M2 | HEIGHT: 61 IN | RESPIRATION RATE: 18 BRPM | WEIGHT: 151.4 LBS | TEMPERATURE: 98.3 F

## 2023-12-28 RX ORDER — FLUTICASONE FUROATE, UMECLIDINIUM BROMIDE AND VILANTEROL TRIFENATATE 100; 62.5; 25 UG/1; UG/1; UG/1
POWDER RESPIRATORY (INHALATION)
Qty: 180 EACH | Refills: 1 | Status: SHIPPED | OUTPATIENT
Start: 2023-12-28

## 2023-12-29 LAB — SPECIMEN STATUS REPORT: NORMAL

## 2024-01-01 ENCOUNTER — HOSPITAL ENCOUNTER (EMERGENCY)
Age: 69
End: 2024-07-22
Attending: FAMILY MEDICINE
Payer: MEDICARE

## 2024-01-01 DIAGNOSIS — I46.9 PEA (PULSELESS ELECTRICAL ACTIVITY) (HCC): ICD-10-CM

## 2024-01-01 DIAGNOSIS — I46.9 CARDIAC ARREST (HCC): Primary | ICD-10-CM

## 2024-01-01 DIAGNOSIS — N18.6 ESRD ON HEMODIALYSIS (HCC): ICD-10-CM

## 2024-01-01 DIAGNOSIS — Z99.2 ESRD ON HEMODIALYSIS (HCC): ICD-10-CM

## 2024-01-01 PROCEDURE — 92950 HEART/LUNG RESUSCITATION CPR: CPT

## 2024-01-01 PROCEDURE — 99285 EMERGENCY DEPT VISIT HI MDM: CPT

## 2024-01-05 ENCOUNTER — CARE COORDINATION (OUTPATIENT)
Facility: CLINIC | Age: 69
End: 2024-01-05

## 2024-01-05 NOTE — CARE COORDINATION
Care Transitions Follow Up Call    Care Transitions Outreach Attempt    CTN Attempted to reach patient for transitions of care follow up. Unable to reach patient. Unable to leave a voice message . Recording \"can't accept more messages. \"    Patient: Jhoana Membreno Patient : 1955 MRN: [id number]    Last Discharge Facility       Date Complaint Diagnosis Description Type Department Provider    23 Nausea & Vomiting; Malaise Hypervolemia, unspecified hypervolemia type ... ED to Hosp-Admission (Discharged) (ADMITTED) [facility] Allan Flowers MD; TALIA Ochoa           follow up appointment(s):   Future Appointments   Date Time Provider Department Center   2024  3:00 PM Rashid Vila MD [facility] BS AMB          Cesar Moise RN

## 2024-01-12 ENCOUNTER — CARE COORDINATION (OUTPATIENT)
Facility: CLINIC | Age: 69
End: 2024-01-12

## 2024-01-12 NOTE — CARE COORDINATION
Care Transitions Follow Up Call    Patient Current Location:  Home: [address]  [facility] [address]    LPN Care Coordinator contacted the patient by telephone to follow up after admission. Verified name and  with patient as identifiers.    Patient: Jhoana Membreno  Patient : 1955   MRN: [id number]  Reason for Admission: nausea and vomiting and abd .  Discharge Date: 12/15/23 RARS: Readmission Risk Score: 24.6      Needs to be reviewed by the provider   Additional needs identified to be addressed with provider: No  none             Method of communication with provider: none.    Spoke with patient briefly.  Patient states she is doing well.  Patient has no questions or concerns.      Follow Up  Future Appointments   Date Time Provider Department Center   2024  3:00 PM Rashid Vila MD [facility] AMB        Care Transitions Subsequent and Final Call    Subsequent and Final Calls  Care Transitions Interventions  Other Interventions:             Plan for follow-up call in 5-7 days based on severity of symptoms and risk factors.  Plan for next call:  final call    Vicki Gallardo LPN

## 2024-01-12 NOTE — H&P
"[facility]  Obstetric History and Physical    Referring Provider: Mary Garcia MD      CC:  labor.    Subjective     Patient is a 28 y.o. female  currently at 30w3d, who presents the office for evaluation of  labor.  Patient presents the office today with complaint of abdominal cramping and vaginal spotting.  Patient reports having intercourse yesterday however did not start spotting until today.  Today she revealed contractions 45 to 60 seconds 8/h throughout the day.  Patient reports normal fetal activity.   course uncomplicated until today.        The following portions of the patients history were reviewed and updated as appropriate: current medications, allergies, past medical history, past surgical history, past family history, past social history, and problem list .       Prenatal Information:   Maternal Prenatal Labs  Blood Type No results found for: \"ABO\"   Rh Status No results found for: \"RH\"   Antibody Screen No results found for: \"ABSCRN\"   Gonnorhea No results found for: \"GCCX\"   Chlamydia No results found for: \"CLAMYDCU\"   RPR No results found for: \"RPR\"   Syphilis Antibody No results found for: \"SYPHILIS\"   Rubella No results found for: \"RUBELLAIGGIN\"   Hepatitis B Surface Antigen No results found for: \"HEPBSAG\"   HIV-1 Antibody No results found for: \"LABHIV1\"   Hepatitis C Antibody No results found for: \"HEPCAB\"   Rapid Urin Drug Screen No results found for: \"AMPMETHU\", \"BARBITSCNUR\", \"LABBENZSCN\", \"LABMETHSCN\", \"LABOPIASCN\", \"THCURSCR\", \"COCAINEUR\", \"AMPHETSCREEN\", \"PROPOXSCN\", \"BUPRENORSCNU\", \"METAMPSCNUR\", \"OXYCODONESCN\", \"TRICYCLICSCN\"   Group B Strep Culture No results found for: \"GBSANTIGEN\"           External Prenatal Results       Pregnancy Outside Results - Transcribed From Office Records - See Scanned Records For Details       Test Value Date Time    ABO ^ A  23     Rh ^ Positive  23     Antibody Screen  Negative  23 0910      ^ Normal  " [place], [place]         NAME: Merline Medina   :  1955   MRN:  [id number]     Date/Time:  9/10/2022 9:52 PM    Patient PCP: Talib Decker MD       Subjective:   CHIEF COMPLAINT: Abdominal pain    HISTORY OF PRESENT ILLNESS:     Kvng Wahl is a 74 y.o.  female who presents with a complaint of abdominal pain as well as nausea, vomiting and loose stools. The patient has a past medical history of diverticulitis, end-stage renal disease with renal transplant, dialysis  and , COPD, hypertension, hypothyroidism and hyperlipidemia. The patient has had abdominal pain for about the last month and was recently admitted for acute diverticulitis, discharged home on 2022. She was again in the emergency room for the same complaints on 2022 but discharged home from the ED. She has been having episodes of nausea, vomiting and loose stools she reports. She states she is unable to keep medication down. She returned to the emergency room this evening for the above complaints as well as generalized weakness. She was prescribed Augmentin and Flagyl has been unable to come down secondary to nausea and vomiting. She reports feeling worse and that her last dialysis was this past Monday. Her nephrologist is Lona Lei. Labs obtained in the emergency room revealed a hemoglobin of 9.6, hematocrit of 30.3, potassium 3.2, BUN of 37, CO2 of 14, creatinine of 7.80, ALT of 13, AST of 11, lipase of 768, BNP of 8118. CT of the abdomen pelvis revealed mild irregularity of the right colonic margins suggestive of possible colitis. Extensive colonic diverticula present without evidence of acute diverticulitis or obstruction. Patient was seen and examined by me resting in bed with no signs and symptoms of acute distress.   Patient states that she has not moved from discharge or her last visit to the emergency 23     Varicella IgG       Rubella ^ positive  23     Hgb  11.5 g/dL 23 0910      ^ 13.6 g/dL 23 1520    Hct  33.6 % 23 0910      ^ 39.9 % 23 1520    Glucose Fasting GTT  67 mg/dL 24 1038    Glucose Tolerance Test 1 hour  118 mg/dL 24 1038    Glucose Tolerance Test 3 hour  57 mg/dL 24 1038    Gonorrhea (discrete)       Chlamydia (discrete)       RPR ^ Nonreactive  23 1520    VDRL       Syphilis Antibody       HBsAg ^ Negative  23 1520    Herpes Simplex Virus PCR       Herpes Simplex VIrus Culture       HIV ^ Non Reactive  23 1520      ^ negative  23     Hep C RNA Quant PCR       Hep C Antibody       AFP       Group B Strep       GBS Susceptibility to Clindamycin       GBS Susceptibility to Erythromycin       Fetal Fibronectin       Genetic Testing, Maternal Blood                 Drug Screening       Test Value Date Time    Urine Drug Screen       Amphetamine Screen       Barbiturate Screen ^ Negative  23 1503    Benzodiazepine Screen ^ Negative  23 1503    Methadone Screen ^ Negative  23 1503    Phencyclidine Screen       Opiates Screen ^ Negative  23 1503    THC Screen       Cocaine Screen ^ Negative  23 1503    Propoxyphene Screen       Buprenorphine Screen       Methamphetamine Screen       Oxycodone Screen ^ Negative  23 1503    Tricyclic Antidepressants Screen                 Legend    ^: Historical                              Past OB History:       OB History    Para Term  AB Living   1 0 0 0 0 0   SAB IAB Ectopic Molar Multiple Live Births   0 0 0 0 0 0      # Outcome Date GA Lbr Connor/2nd Weight Sex Delivery Anes PTL Lv   1 Current                Past Medical History: Past Medical History:   Diagnosis Date    Anxiety     Eating disorder     anorexia binge/purge type. Ab Costa is therapist.    Poison ivy dermatitis       Past Surgical History Past Surgical History:   Procedure  room.  She complains of inability to keep food or medication down. And states that she is getting weaker and unable to care for herself at this time. Missed dialysis on Friday, her last dialysis was on Monday 9/5. Patient was given IV Flagyl and Zosyn in the emergency room. Patient has been brought in for observation of possible colitis and generalized weakness. Her expected length of stay will be at least 24 to 48 hours.     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    Dialysis patient [facility])     M/W/F    Diverticular disease of colon 08/21/2013    Dyspepsia and other specified disorders of function of stomach     stomach ulcer    Elevated troponin 10/21/2019    Essential hypertension     GERD (gastroesophageal reflux disease)     History of chemotherapy     History of renal transplant 08/21/2013    (LD 2/12/2002)    HLD (hyperlipidemia)     Hypercholesteremia     Hypertension     Hypothyroidism 03/23/2022    Kidney transplant rejection     Menopause     Migraine     Obesity     Pyelonephritis 07/13/2020    Renal cyst 2002    kidney transplant     Spontaneous bacterial peritonitis ([facility] [address] 01/16/2019    Ulcer         Past Surgical History:   Procedure Laterality Date    COLONOSCOPY      Dr Jabari Goyal  5yrs ago    ENDOSCOPY VISIT-OUTPATIENT      Dr Jabari Goyal  5 yrs ago    ENDOSCOPY, COLON, DIAGNOSTIC      with Dr. Jung Morgan CHOLECYSTECTOMY  02/2021    HX GI      ulcer    HX HEENT      sinus surg    HX RENAL TRANSPLANT      HX TRANSPLANT      kidney transplant 2002    VASCULAR SURGERY PROCEDURE UNLIST      shunt in prep for dialysis       Social History     Tobacco Use Laterality Date    FRENULUM CLIPPING        Family History: Family History   Problem Relation Age of Onset    Skin cancer Mother     No Known Problems Father     Heart disease Maternal Aunt     Diabetes Paternal Grandmother     Breast cancer Paternal Grandmother 60    Melanoma Paternal Grandmother     Other Sister         laryngeal polyps    Thyroid disease Maternal Grandmother     No Known Problems Maternal Grandfather     No Known Problems Paternal Grandfather     No Known Problems Sister     No Known Problems Sister     No Known Problems Sister       Social History:  reports that she has never smoked. She has never used smokeless tobacco.   reports that she does not currently use alcohol after a past usage of about 1.0 standard drink of alcohol per week.   reports no history of drug use.                   General ROS Negative Findings:Headaches, Visual Changes, Epigastric pain, Anorexia, Nausia/Vomiting, and ROM    ROS     All other systems have been reviewed and are neg  Objective       Vital Signs Range for the last 24 hours  Temperature:     Temp Source:     BP: BP: (104)/(68) 104/68   Pulse:     Respirations:     SPO2:     O2 Amount (l/min):     O2 Devices     Weight: Weight:  [60.8 kg (134 lb)] 60.8 kg (134 lb)     Physical Examination:   General:   alert, appears stated age, and cooperative   Skin:   normal   HEENT:     Lungs:   clear to auscultation bilaterally   Heart:   regular rate and rhythm, S1, S2 normal, no murmur, click, rub or gallop   Gastrointestinal: Abdomen soft, gravid uterus, guarding benign exam   Lower Extremities: No edema, no calf tenderness   : External genitalia: normal general appearance  Uterus: enlarged   Musculoskeletal:     Neuro: No focal deficits noted.         Presentation: vtx   Cervix: Exam by:  Kaiser   Dilation:  3+ mid position   Effacement:  70   Station:  -2       Fetal Heart Rate Assessment   Method:     Beats/min:     Baseline:     Varibility:     Accels:    Smoking status: Never    Smokeless tobacco: Never   Substance Use Topics    Alcohol use: No        Family History   Problem Relation Age of Onset    Cervical Cancer Mother     Arthritis-rheumatoid Mother     Hypertension Father     Diabetes Father     Thyroid Disease Sister     Colon Polyps Brother      Allergies   Allergen Reactions    Aspirin Rash and Unknown (comments)    Bactrim [Sulfamethoxazole-Trimethoprim] Rash and Unknown (comments)    Bromfenac Rash and Unknown (comments)    Cefepime Other (comments)     Neurological reaction    Ceftriaxone Rash    Copper Rash    Ibuprofen Rash and Unknown (comments)    Ketorolac Tromethamine Rash and Unknown (comments)    Relafen [Nabumetone] Rash and Unknown (comments)    Rifampin Rash and Unknown (comments)    Vancomycin Rash and Unknown (comments)     Pt reports causes itching, takes benadryl prior to use. Pt denies anaphylaxis. Requires benadryl with each vancomycin dose        Prior to Admission medications    Medication Sig Start Date End Date Taking? Authorizing Provider   ondansetron (ZOFRAN ODT) 4 mg disintegrating tablet Take 2 Tablets by mouth every eight (8) hours as needed for Nausea or Nausea or Vomiting. 9/10/22  Yes Dana Patterson,    amoxicillin-clavulanate (Augmentin) 875-125 mg per tablet Take 1 Tablet by mouth two (2) times a day. 9/6/22   Mac Pete MD   metroNIDAZOLE (FlagyL) 500 mg tablet Take 1 Tablet by mouth three (3) times daily for 10 days. 9/6/22 9/16/22  Edwin Pete MD   HYDROcodone-acetaminophen (Norco) 5-325 mg per tablet Take 1 Tablet by mouth every six (6) hours as needed for Pain for up to 3 days. Max Daily Amount: 4 Tablets. 9/6/22 9/9/22  Edwin Pete MD   polyethylene glycol (Miralax) 17 gram packet Take 1 Packet by mouth daily. 9/6/22   Mac Pete MD   cinacalcet (SENSIPAR) 30 mg tablet Take 30 mg by mouth daily.     Provider, Historical   clopidogreL (PLAVIX) 75 mg tab Take 75 mg   Decels:     Tracing Category:     NST-indications  labor, interpretation reactive, moderate variability, accelerations present 15 x 15, no fetal deceleration noted, onset 1404 end time 1424, contractions every 2 to 3 minutes.  Uterine Assessment   Method:     Frequency (min):     Ctx Count in 10 min:     Duration:     Intensity:     Intensity by IUPC:     Resting Tone:     Resting Tone by IUPC:     Jackson Units:       Laboratory Results:   Lab Results (last 24 hours)       ** No results found for the last 24 hours. **          Radiology Review:   Imaging Results (Last 24 Hours)       ** No results found for the last 24 hours. **          Other Studies:    Assessment & Plan        labor        Assessment:  1.  Intrauterine pregnancy at 30w3d weeks gestation with reactive fetal status.    2.   labor  3.    4.      Plan:  1.  Admit, labs, magnesium sulfate tocolysis, steroids for fetal benefit, GBS prophylaxis, and NICU consult.  2. Plan of care has been reviewed with patient.  3.  Risks, benefits of treatment plan have been discussed.  4.  All questions have been answered.  5   will notify Dr. Mcginnis of admission.      William Saab DO  2024  14:11 EST   by mouth daily. Provider, Historical   losartan (COZAAR) 50 mg tablet Take 1 Tablet by mouth daily. 8/26/22   Whitney Rutherford MD   cyclobenzaprine (FLEXERIL) 5 mg tablet Take 1 Tablet by mouth three (3) times daily as needed for Muscle Spasm(s). 8/18/22   Whitney Rutherford MD   amiodarone (CORDARONE) 200 mg tablet TAKE 1 TABLET BY MOUTH EVERY DAY 8/16/22   Whitney Rutherford MD   amLODIPine [facility]) 10 mg tablet Take 1 tablet by mouth once daily 8/9/22   Whitney Rutherford MD   carvediloL (COREG) 25 mg tablet Take 1 Tablet by mouth two (2) times daily (with meals). 7/27/22   Whitney Rutherford MD   meclizine (ANTIVERT) 25 mg tablet Take 1 Tablet by mouth three (3) times daily as needed for Dizziness. 7/27/22   Whitney Rutherford MD   dexlansoprazole (Dexilant) 60 mg CpDB capsule (delayed release) Take 1 Capsule by mouth in the morning. 7/27/22   Whitney Rutherford MD   fluticasone propionate [facility]) 50 mcg/actuation nasal spray Take 1 [place] by Both Nostrils route in the morning. 7/27/22   Whitney Rutherford MD   levothyroxine (SYNTHROID) 75 mcg tablet Take 1 Tablet by mouth Daily (before breakfast). 7/27/22   Whitney Rutherford MD   simvastatin (ZOCOR) 20 mg tablet TAKE 1 TABLET BY MOUTH DAILY AS DIRECTED. 7/27/22   Whitney Rutherford MD   fluticasone-umeclidinium-vilanterol (Trelegy Ellipta) 100-62.5-25 mcg inhaler Take 1 Puff by inhalation in the morning. 7/27/22   Whitney Rutherford MD   valGANciclovir (VALCYTE) 450 mg tablet Take 2 Tablets by mouth in the morning. 7/27/22   Whitney Rutherford MD   dicyclomine (BENTYL) 10 mg capsule Take 1 Capsule by mouth in the morning. 7/27/22   Whitney Rutherford MD   montelukast (SINGULAIR) 10 mg tablet Take 1 Tablet by mouth in the morning. 7/27/22   Whitney Rutherford MD   hyoscyamine SL (LEVSIN/SL) 0.125 mg SL tablet 1 Tablet by SubLINGual route every four (4) hours as needed (irritable bowel).  7/27/22   Whitney Rutherford MD   sucralfate (CARAFATE) 1 gram tablet TAKE 1 TABLET BY MOUTH FOUR TIMES DAILY 7/27/22   Whitney Rutherford MD apixaban (ELIQUIS) 2.5 mg tablet Take 1 Tablet by mouth two (2) times a day. 7/27/22   Talib Decker MD   lidocaine-prilocaine (EMLA) topical cream APPLY SMALL AMOUNT TO ACCESS SITE (AVF) 1 TO 2 HOURS BEFORE DIALYSIS. COVER WITH OCCLUSIVE DRESSING (SARAN WRAP) 6/27/22   Provider, Historical   zolpidem (AMBIEN) 5 mg tablet Take 1 Tablet by mouth nightly as needed for Sleep. Max Daily Amount: 5 mg. 5/30/22   Talib Decker MD   predniSONE (DELTASONE) 5 mg tablet Take 1 Tablet by mouth daily. Provider, Historical   albuterol (PROVENTIL VENTOLIN) 2.5 mg /3 mL (0.083 %) nebu USE 1 VIAL VIA NEBULIZER THREE TIMES DAILY 12/23/21   Talib Decker MD   calcitRIOL (ROCALTROL) 0.5 mcg capsule Take 0.5 mcg by mouth as directed. Take on non dialysis days 7/19/21   Abelardo Yepez MD   RenaPlex-D 800 mcg-12.5 mg -2,000 unit tab Take 1 Tablet by mouth daily. 4/15/21   Abelardo Yepez MD   sevelamer carbonate (RENVELA) 800 mg tab tab Take 800 mg by mouth three (3) times daily. 6/8/21   Abelardo Yepez MD   aluminum & magnesium hydroxide-simethicone (Maalox Maximum Strength) 400-400-40 mg/5 mL suspension Take 10 mL by mouth every six (6) hours as needed for Indigestion. 9/17/20   Kwan Tee MD   ESTRACE 0.01 % (0.1 mg/gram) vaginal cream INSERT 2 GRAMS INTO VAGINA EVERY MONDAY AND THURSDAY 4/11/17   Krupa Do MD   cranberry extract 425 mg cap Take 425 mg by mouth daily. 1/17/14   Provider, Historical       REVIEW OF SYSTEMS:     I am not able to complete the review of systems because:    The patient is intubated and sedated    The patient has altered mental status due to his acute medical problems    The patient has baseline aphasia from prior stroke(s)    The patient has baseline dementia and is not reliable historian    The patient is in acute medical distress and unable to provide information           Total of 12 systems reviewed as follows:       POSITIVE= underlined text  Negative = text not underlined  General:  fever, chills, sweats, generalized weakness, weight loss/gain,      loss of appetite   Eyes:    blurred vision, eye pain, loss of vision, double vision  ENT:    rhinorrhea, pharyngitis   Respiratory:   cough, sputum production, SOB, VILLA, wheezing, pleuritic pain   Cardiology:   chest pain, palpitations, orthopnea, PND, edema, syncope   Gastrointestinal:  abdominal pain , N/V, diarrhea, dysphagia, constipation, bleeding   Genitourinary:  frequency, urgency, dysuria, hematuria, incontinence   Muskuloskeletal :  arthralgia, myalgia, back pain  Hematology:  easy bruising, nose or gum bleeding, lymphadenopathy   Dermatological: rash, ulceration, pruritis, color change / jaundice  Endocrine:   hot flashes or polydipsia   Neurological:  headache, dizziness, confusion, focal weakness, paresthesia,     Speech difficulties, memory loss, gait difficulty  Psychological: Feelings of anxiety, depression, agitation    Objective:   VITALS:    Visit Vitals  /65 (BP 1 Location: Right upper arm, BP Patient Position: Semi fowlers)   Pulse 76   Temp 97.7 °F (36.5 °C)   Resp 16   Ht 5' 1\" (1.549 m)   Wt 64.4 kg (142 lb)   SpO2 100%   BMI 26.83 kg/m²       PHYSICAL EXAM:    General:    Alert, cooperative, no distress, appears stated age. HEENT: Atraumatic, anicteric sclerae, pink conjunctivae     No oral ulcers, mucosa moist, throat clear, dentition fair  Neck:  Supple, symmetrical,  thyroid: non tender  Lungs:   Clear to auscultation bilaterally. No Wheezing or Rhonchi. No rales. Chest wall:  No tenderness  No Accessory muscle use. Heart:   Regular  rhythm,  No  murmur   No edema. +Bruit/thrill to AVF  Abdomen:   Soft, TTP without guarding or rebound tenderness. Not distended. Bowel sounds proactive  Extremities: No cyanosis. No clubbing,      Skin turgor normal, Capillary refill normal, Radial dial pulse 2+  Skin:     Not pale. Not Jaundiced  No rashes   Psych:  Good insight. Not depressed.   Not anxious or agitated. Neurologic: No facial asymmetry. No aphasia or slurred speech. Symmetrical strength, Sensation grossly intact. Alert and oriented X 4.       ______________________________________________________________________  Given the patient's current clinical presentation, I have a high level of concern for decompensation if discharged from the emergency department. Complex decision making was performed, which includes reviewing the patient's available past medical records, laboratory results, and x-ray films. My assessment of this patient's clinical condition and my plan of care is as follows. Assessment / Plan:  #1 colitis:  -Was recently admitted and returned to the ED for similar symptoms.  -Has been treated for acute diverticulitis recently and pancreatitis. -Unclear if she is followed up with GI outpatient. Would likely benefit from inpatient consultation if she remains in hospital for several days. -CT abdomen showed mild irregularity of the right colonic margins suggestive of possible colitis, extensive colonic diverticula present without evidence of acute diverticulitis or obstruction.  -Lipase is 768, no evidence of acute pancreatitis on CT  -ALT 13, AST 11, alk phosphatase 62  -N.p.o. except for meds  -We will start on gentle IV fluid hydration of normal saline at 50 mils per hour  -Order serum electrolytes  -Supportive care with as needed medication for pain and nausea  -Start clear liquid diet in 24 to 48 hours and advance as tolerated  -CMP and lipase in a.m.  -Stool to rule out C.  Difficile  -Contact precautions  -IV Zosyn and Flagyl, switching to p.o. antibiotics when able to tolerate p.o. intake    #2 essential hypertension:  -Chronic condition, controlled  -Continue Coreg, Norvasc, Cozaar  -Monitor blood pressure closely    #3 hyperlipidemia:  -Chronic, continue statin    #4 atrial fibrillation:  -Chronic, controlled  -Continue Coreg and Eliquis    #5 ESRD on dialysis:  -[facility] Patience for hemodialysis  -Usual hemodialysis days are Monday and Friday  -Repeat renal panel in a.m. #6 hypothyroidism:  -Chronic  -Continue Synthroid    #7 GERD:  -Chronic  -Continue Dexilant      Code Status: Full code  Surrogate Decision Maker: See chart for details    DVT Prophylaxis: Eliquis  GI Prophylaxis: Dexilant         _______________________________________________________________________  Care Plan discussed with:    Comments   Patient x    Family      RN x    Care Manager                    Consultant:      _______________________________________________________________________  Expected  Disposition:   Home with Family x   HH/PT/OT/RN    SNF/LTC    [facility]    ________________________________________________________________________  TOTAL TIME:  55 Minutes          Comments    x Reviewed previous records   >50% of visit spent in counseling and coordination of care x Discussion with patient and/or family and questions answered       ________________________________________________________________________  Signed: Yaz Muro NP    Procedures: see electronic medical records for all procedures/Xrays and details which were not copied into this note but were reviewed prior to creation of Plan.     LAB DATA REVIEWED:    Recent Results (from the past 24 hour(s))   CBC WITH AUTOMATED DIFF    Collection Time: 09/10/22  7:13 PM   Result Value Ref Range    WBC 6.6 4.6 - 13.2 K/uL    RBC 2.95 (L) 4.20 - 5.30 M/uL    HGB 9.6 (L) 12.0 - 16.0 g/dL    HCT 30.3 (L) 35.0 - 45.0 %    .7 (H) 78.0 - 100.0 FL    MCH 32.5 24.0 - 34.0 PG    MCHC 31.7 31.0 - 37.0 g/dL    RDW 18.1 (H) 11.6 - 14.5 %    PLATELET 995 442 - 143 K/uL    MPV 8.8 (L) 9.2 - 11.8 FL    NRBC 0.0 0.0  WBC    ABSOLUTE NRBC 0.00 0.00 - 0.01 K/uL    NEUTROPHILS 55 40 - 73 %    LYMPHOCYTES 25 21 - 52 %    MONOCYTES 16 (H) 3 - 10 %    EOSINOPHILS 2 0 - 5 %    BASOPHILS 1 0 - 2 %    IMMATURE GRANULOCYTES 1 (H) 0 - 0.5 %    ABS. NEUTROPHILS 3.7 1.8 - 8.0 K/UL    ABS. LYMPHOCYTES 1.6 0.9 - 3.6 K/UL    ABS. MONOCYTES 1.1 0.05 - 1.2 K/UL    ABS. EOSINOPHILS 0.1 0.0 - 0.4 K/UL    ABS. BASOPHILS 0.0 0.0 - 0.1 K/UL    ABS. IMM. GRANS. 0.0 0.00 - 0.04 K/UL    DF AUTOMATED     METABOLIC PANEL, COMPREHENSIVE    Collection Time: 09/10/22  7:13 PM   Result Value Ref Range    Sodium 142 136 - 145 mmol/L    Potassium 3.2 (L) 3.5 - 5.5 mmol/L    Chloride 111 100 - 111 mmol/L    CO2 14 (L) 21 - 32 mmol/L    Anion gap 17 3.0 - 18.0 mmol/L    Glucose 91 74 - 99 mg/dL    BUN 37 (H) 7 - 18 mg/dL    Creatinine 7.80 (H) 0.60 - 1.30 mg/dL    BUN/Creatinine ratio 5 (L) 12 - 20      GFR est AA 6 (L) >60 ml/min/1.73m2    GFR est non-AA 5 (L) >60 ml/min/1.73m2    Calcium 6.1 (L) 8.5 - 10.1 mg/dL    Bilirubin, total 0.4 0.2 - 1.0 mg/dL    AST (SGOT) 11 10 - 38 U/L    ALT (SGPT) 13 13 - 56 U/L    Alk. phosphatase 62 45 - 117 U/L    Protein, total 5.0 (L) 6.4 - 8.2 g/dL    Albumin 2.3 (L) 3.4 - 5.0 g/dL    Globulin 2.7 2.0 - 4.0 g/dL    A-G Ratio 0.9 0.8 - 1.7     LIPASE    Collection Time: 09/10/22  7:13 PM   Result Value Ref Range    Lipase 768 (H) 73 - 393 U/L   LACTIC ACID    Collection Time: 09/10/22  7:13 PM   Result Value Ref Range    Lactic acid 0.4 0.4 - 2.0 mmol/L   NT-PRO BNP    Collection Time: 09/10/22  7:13 PM   Result Value Ref Range    NT pro-BNP 8,118 (H) 0 - 900 pg/mL      CT Results (most recent):  Results from Hospital Encounter encounter on 09/10/22    CT ABD PELV WO CONT    Narrative  EXAM: CT of the abdomen and pelvis    INDICATION: Vomiting. Abdominal pain    COMPARISON: 9/6/2022    TECHNIQUE: Axial CT imaging of the abdomen and pelvis was performed without  intravenous or oral contrast. Multiplanar reformats were generated. One or more dose reduction techniques were used on this CT: automated exposure  control, adjustment of the mAs and/or kVp according to patient's size, and  iterative reconstruction techniques.  The specific techniques utilized on this CT  exam have been documented in the patient's electronic medical record. Digital Imaging and Communications in Medicine (DICOM) format image data are  available to nonaffiliated external healthcare facilities or entities on a  secure, media free, reciprocally searchable basis with patient authorization for  at least a 12-month period after this study. _______________    FINDINGS:    LOWER CHEST: Interval development of trace right pleural effusions since the  prior study. LIVER, BILIARY: Liver is normal. No biliary dilation. Cholecystectomy. PANCREAS: Atrophic. SPLEEN: Normal.    ADRENALS: Normal.    KIDNEYS/URETERS/BLADDER: Bilateral atrophic kidneys with cysts. PELVIC ORGANS: Right pelvic kidney demonstrates cystic mass with adjacent  perinephric stranding. There is no hydronephrosis. VASCULATURE: Prominent atherosclerotic calcifications noted without aneurysm. LYMPH NODES: No enlarged lymph nodes. GASTROINTESTINAL TRACT: Multiple colonic diverticula are present. Mild  irregularity of the right colonic margins suggests the possibility of possible  colitis but without obstruction. Appendix appears normal.    BONES: Degenerative changes are seen throughout the spine with partial fusion  seen in the thoracic segments. OTHER: Atrophic uterus. _______________    Impression  Atrophic native kidneys. Transplant kidney right lower quadrant demonstrating  perinephric stranding similar to the prior study. Lack of contrast limits  evaluation for pyelonephritis. There is no hydronephrosis however. Mild irregularity of the right colonic margins suggests possible colitis. Clinically correlate. Extensive colonic diverticula are present without evidence of acute  diverticulitis or obstruction.

## 2024-01-14 NOTE — TELEPHONE ENCOUNTER
Patient states she was advised to stop Eliquis before her block procedure, but she is unsure when or if she is supposed to start taking it again. Please advise.      Patient [phone] Pupils equal, round and reactive to light, Extra-ocular movement intact, eyes are clear b/l.

## 2024-01-17 ENCOUNTER — OFFICE VISIT (OUTPATIENT)
Facility: CLINIC | Age: 69
End: 2024-01-17
Payer: MEDICARE

## 2024-01-17 VITALS
OXYGEN SATURATION: 99 % | WEIGHT: 153.9 LBS | HEIGHT: 61 IN | HEART RATE: 82 BPM | SYSTOLIC BLOOD PRESSURE: 139 MMHG | RESPIRATION RATE: 18 BRPM | TEMPERATURE: 98.9 F | DIASTOLIC BLOOD PRESSURE: 68 MMHG | BODY MASS INDEX: 29.06 KG/M2

## 2024-01-17 DIAGNOSIS — I48.20 CHRONIC ATRIAL FIBRILLATION, UNSPECIFIED (HCC): ICD-10-CM

## 2024-01-17 DIAGNOSIS — N18.6 STAGE 5 CHRONIC KIDNEY DISEASE ON CHRONIC DIALYSIS (HCC): ICD-10-CM

## 2024-01-17 DIAGNOSIS — Z00.00 MEDICARE ANNUAL WELLNESS VISIT, SUBSEQUENT: Primary | ICD-10-CM

## 2024-01-17 DIAGNOSIS — F33.1 MODERATE EPISODE OF RECURRENT MAJOR DEPRESSIVE DISORDER (HCC): ICD-10-CM

## 2024-01-17 DIAGNOSIS — J01.40 ACUTE NON-RECURRENT PANSINUSITIS: ICD-10-CM

## 2024-01-17 DIAGNOSIS — Z99.2 STAGE 5 CHRONIC KIDNEY DISEASE ON CHRONIC DIALYSIS (HCC): ICD-10-CM

## 2024-01-17 DIAGNOSIS — I25.119 CORONARY ARTERY DISEASE INVOLVING NATIVE CORONARY ARTERY OF NATIVE HEART WITH ANGINA PECTORIS (HCC): ICD-10-CM

## 2024-01-17 DIAGNOSIS — I50.32 CHRONIC DIASTOLIC (CONGESTIVE) HEART FAILURE (HCC): ICD-10-CM

## 2024-01-17 PROBLEM — E43 SEVERE PROTEIN-CALORIE MALNUTRITION (HCC): Status: RESOLVED | Noted: 2022-09-11 | Resolved: 2024-01-17

## 2024-01-17 PROCEDURE — 1090F PRES/ABSN URINE INCON ASSESS: CPT | Performed by: STUDENT IN AN ORGANIZED HEALTH CARE EDUCATION/TRAINING PROGRAM

## 2024-01-17 PROCEDURE — G8484 FLU IMMUNIZE NO ADMIN: HCPCS | Performed by: STUDENT IN AN ORGANIZED HEALTH CARE EDUCATION/TRAINING PROGRAM

## 2024-01-17 PROCEDURE — 1123F ACP DISCUSS/DSCN MKR DOCD: CPT | Performed by: STUDENT IN AN ORGANIZED HEALTH CARE EDUCATION/TRAINING PROGRAM

## 2024-01-17 PROCEDURE — G8399 PT W/DXA RESULTS DOCUMENT: HCPCS | Performed by: STUDENT IN AN ORGANIZED HEALTH CARE EDUCATION/TRAINING PROGRAM

## 2024-01-17 PROCEDURE — G8428 CUR MEDS NOT DOCUMENT: HCPCS | Performed by: STUDENT IN AN ORGANIZED HEALTH CARE EDUCATION/TRAINING PROGRAM

## 2024-01-17 PROCEDURE — 1036F TOBACCO NON-USER: CPT | Performed by: STUDENT IN AN ORGANIZED HEALTH CARE EDUCATION/TRAINING PROGRAM

## 2024-01-17 PROCEDURE — G8417 CALC BMI ABV UP PARAM F/U: HCPCS | Performed by: STUDENT IN AN ORGANIZED HEALTH CARE EDUCATION/TRAINING PROGRAM

## 2024-01-17 PROCEDURE — 3017F COLORECTAL CA SCREEN DOC REV: CPT | Performed by: STUDENT IN AN ORGANIZED HEALTH CARE EDUCATION/TRAINING PROGRAM

## 2024-01-17 PROCEDURE — G0439 PPPS, SUBSEQ VISIT: HCPCS | Performed by: STUDENT IN AN ORGANIZED HEALTH CARE EDUCATION/TRAINING PROGRAM

## 2024-01-17 PROCEDURE — 3078F DIAST BP <80 MM HG: CPT | Performed by: STUDENT IN AN ORGANIZED HEALTH CARE EDUCATION/TRAINING PROGRAM

## 2024-01-17 PROCEDURE — 99213 OFFICE O/P EST LOW 20 MIN: CPT | Performed by: STUDENT IN AN ORGANIZED HEALTH CARE EDUCATION/TRAINING PROGRAM

## 2024-01-17 PROCEDURE — 3075F SYST BP GE 130 - 139MM HG: CPT | Performed by: STUDENT IN AN ORGANIZED HEALTH CARE EDUCATION/TRAINING PROGRAM

## 2024-01-17 RX ORDER — AZITHROMYCIN 250 MG/1
250 TABLET, FILM COATED ORAL SEE ADMIN INSTRUCTIONS
Qty: 6 TABLET | Refills: 0 | Status: SHIPPED | OUTPATIENT
Start: 2024-01-17 | End: 2024-01-22

## 2024-01-17 RX ORDER — LOSARTAN POTASSIUM 50 MG/1
TABLET ORAL
COMMUNITY
Start: 2023-12-28 | End: 2024-01-17

## 2024-01-17 RX ORDER — CLOPIDOGREL BISULFATE 75 MG/1
TABLET ORAL
COMMUNITY
Start: 2023-12-28 | End: 2024-01-17

## 2024-01-17 RX ORDER — SUCRALFATE 1 G/1
TABLET ORAL
COMMUNITY
Start: 2023-12-28 | End: 2024-01-17

## 2024-01-17 RX ORDER — NIFEDIPINE 30 MG/1
TABLET, FILM COATED, EXTENDED RELEASE ORAL
COMMUNITY
Start: 2024-01-11 | End: 2024-01-17

## 2024-01-17 ASSESSMENT — PATIENT HEALTH QUESTIONNAIRE - PHQ9
7. TROUBLE CONCENTRATING ON THINGS, SUCH AS READING THE NEWSPAPER OR WATCHING TELEVISION: 0
5. POOR APPETITE OR OVEREATING: 0
SUM OF ALL RESPONSES TO PHQ QUESTIONS 1-9: 0
6. FEELING BAD ABOUT YOURSELF - OR THAT YOU ARE A FAILURE OR HAVE LET YOURSELF OR YOUR FAMILY DOWN: 0
SUM OF ALL RESPONSES TO PHQ QUESTIONS 1-9: 0
1. LITTLE INTEREST OR PLEASURE IN DOING THINGS: 0
SUM OF ALL RESPONSES TO PHQ QUESTIONS 1-9: 0
SUM OF ALL RESPONSES TO PHQ QUESTIONS 1-9: 0
9. THOUGHTS THAT YOU WOULD BE BETTER OFF DEAD, OR OF HURTING YOURSELF: 0
2. FEELING DOWN, DEPRESSED OR HOPELESS: 0
4. FEELING TIRED OR HAVING LITTLE ENERGY: 0
3. TROUBLE FALLING OR STAYING ASLEEP: 0
8. MOVING OR SPEAKING SO SLOWLY THAT OTHER PEOPLE COULD HAVE NOTICED. OR THE OPPOSITE, BEING SO FIGETY OR RESTLESS THAT YOU HAVE BEEN MOVING AROUND A LOT MORE THAN USUAL: 0
SUM OF ALL RESPONSES TO PHQ9 QUESTIONS 1 & 2: 0
10. IF YOU CHECKED OFF ANY PROBLEMS, HOW DIFFICULT HAVE THESE PROBLEMS MADE IT FOR YOU TO DO YOUR WORK, TAKE CARE OF THINGS AT HOME, OR GET ALONG WITH OTHER PEOPLE: 0

## 2024-01-17 NOTE — PROGRESS NOTES
Jhoana Membreno presents today for   Chief Complaint   Patient presents with    Medicare AWV     Medicare wellness       Is someone accompanying this pt? no    Is the patient using any DME equipment during OV? no    Health Maintenance reviewed and discussed and ordered per Provider.    Health Maintenance Due   Topic Date Due    DTaP/Tdap/Td vaccine (1 - Tdap) Never done    Respiratory Syncytial Virus (RSV) Pregnant or age 60 yrs+ (1 - 1-dose 60+ series) Never done    Shingles vaccine (2 of 2) 04/04/2023    Flu vaccine (1) 08/01/2023    COVID-19 Vaccine (5 - 2023-24 season) 09/01/2023   .      Coordination of Care:  1. \"Have you been to the ER, urgent care clinic since your last visit?  Hospitalized since your last visit?\" No    2. \"Have you seen or consulted any other health care providers outside of the [facility] since your last visit?\" No    3. For patients aged 45-75: Has the patient had a colonoscopy? Yes- No care gap present    If the patient is female:    4. For patients aged 40-74: Has the patient had a mammogram within the past 2 years? Yes- No care gap present    5. For patients aged 21-65: Has the patient had a pap smear? N/A based on age/sex    
Medicare Annual Wellness Visit    Jhoana Membreno is here for Medicare AWV (Medicare wellness)    Assessment & Plan   Medicare annual wellness visit, subsequent  Recommendations for Preventive Services Due: see orders and patient instructions/AVS.  Recommended screening schedule for the next 5-10 years is provided to the patient in written form: see Patient Instructions/AVS.     No follow-ups on file.     Subjective       Patient's complete Health Risk Assessment and screening values have been reviewed and are found in Flowsheets. The following problems were reviewed today and where indicated follow up appointments were made and/or referrals ordered.    No Positive Risk Factors identified today.                  Self-assessment of health:  In general, how would you say your health is?: (!) Poor   Activity, Diet, and Weight:  On average, how many days per week do you engage in moderate to strenuous exercise (like a brisk walk)?: 0 days  On average, how many minutes do you engage in exercise at this level?: 0 min    Do you eat balanced/healthy meals regularly?: Yes    Body mass index is 29.08 kg/m².      Dentist Screen:  Have you seen the dentist within the past year?: (!) No   Vision Screen:  Do you have difficulty driving, watching TV, or doing any of your daily activities because of your eyesight?: No  Have you had an eye exam within the past year?: (!) No  No results found.            Objective   Vitals:    01/17/24 1456   Weight: 69.8 kg (153 lb 14.4 oz)   Height: 1.549 m (5' 1\")      Body mass index is 29.08 kg/m².               Allergies   Allergen Reactions    Valacyclovir      Stroke like symptoms   Other reaction(s): Lost Consciousness    Aspirin Rash     Other reaction(s): Unknown (comments)    Bromfenac Rash     Other reaction(s): Unknown (comments)    Cefepime Other (See Comments)     Neurological reaction    Ceftriaxone Rash    Copper Rash    Ibuprofen Rash     Other reaction(s): Unknown (comments)    
causes itching, takes benadryl prior to use. Pt denies anaphylaxis.    Requires benadryl with each vancomycin dose     Prior to Visit Medications    Medication Sig Taking? Authorizing Provider   azithromycin (ZITHROMAX) 250 MG tablet Take 1 tablet by mouth See Admin Instructions for 5 days 500mg on day 1 followed by 250mg on days 2 - 5 Yes Rashid Vila MD   fluticasone-umeclidin-vilant (TRELEGY ELLIPTA) 100-62.5-25 MCG/ACT AEPB inhaler INHALE 1 PUFF EVERY DAY Yes Rashid Vila MD   omeprazole (PRILOSEC) 40 MG delayed release capsule Take 1 capsule by mouth every morning (before breakfast) Yes Rashid Vila MD   EPINEPHrine (EPIPEN) 0.3 MG/0.3ML SOAJ injection INJECT 0.3 ML INTO THE MUSCLE ONCE AS NEEDED FOR ALLERGIC RESPONSE Yes Rashid Vila MD   ondansetron (ZOFRAN) 4 MG tablet Take 1 tablet by mouth 3 times daily as needed for Nausea or Vomiting Yes Bhupendra Hughes DO   meclizine (ANTIVERT) 25 MG tablet TAKE 1 TABLET THREE TIMES DAILY AS NEEDED FOR DIZZINESS Yes Rashid Vila MD   Dextromethorphan-GG-APAP (CORICIDIN HBP COLD/COUGH/FLU) -325 MG/15ML LIQD Take 30 mLs by mouth every 8 hours as needed (cough) Yes Rashid Vila MD   carvedilol (COREG) 6.25 MG tablet TAKE 1 TABLET BY MOUTH TWICE DAILY ON DAYS OF DIALYSIS Yes Rashid Vila MD   Capsaicin 0.1 % CREA Apply 1 g topically at bedtime Yes Rashid Vila MD   montelukast (SINGULAIR) 10 MG tablet TAKE 1 TABLET EVERY MORNING Yes Rashid Vila MD   dicyclomine (BENTYL) 10 MG capsule TAKE 1 CAPSULE EVERY MORNING Yes Rashid Vila MD   ELIQUIS 2.5 MG TABS tablet TAKE 1 TABLET TWICE DAILY Yes Rashid Vila MD   simvastatin (ZOCOR) 20 MG tablet TAKE 1 TABLET EVERY DAY AS DIRECTED Yes Rashid Vila MD   levothyroxine (SYNTHROID) 75 MCG tablet TAKE 1 TABLET EVERY DAY BEFORE BREAKFAST Yes Rashid Vila MD   fluticasone (FLONASE) 50 MCG/ACT nasal spray USE 1 SPRAY IN EACH NOSTRIL

## 2024-01-17 NOTE — PATIENT INSTRUCTIONS
information.      Personalized Preventive Plan for Jhoana Membreno - 1/17/2024  Medicare offers a range of preventive health benefits. Some of the tests and screenings are paid in full while other may be subject to a deductible, co-insurance, and/or copay.    Some of these benefits include a comprehensive review of your medical history including lifestyle, illnesses that may run in your family, and various assessments and screenings as appropriate.    After reviewing your medical record and screening and assessments performed today your provider may have ordered immunizations, labs, imaging, and/or referrals for you.  A list of these orders (if applicable) as well as your Preventive Care list are included within your After Visit Summary for your review.    Other Preventive Recommendations:    A preventive eye exam performed by an eye specialist is recommended every 1-2 years to screen for glaucoma; cataracts, macular degeneration, and other eye disorders.  A preventive dental visit is recommended every 6 months.  Try to get at least 150 minutes of exercise per week or 10,000 steps per day on a pedometer .  Order or download the FREE \"Exercise & Physical Activity: Your Everyday Guide\" from [employer]. Call 1-104.547.1696 or search [employer] online.  You need 2170-0198 mg of calcium and 5312-6158 IU of vitamin D per day. It is possible to meet your calcium requirement with diet alone, but a vitamin D supplement is usually necessary to meet this goal.  When exposed to the sun, use a sunscreen that protects against both UVA and UVB radiation with an SPF of 30 or greater. Reapply every 2 to 3 hours or after sweating, drying off with a towel, or swimming.  Always wear a seat belt when traveling in a car. Always wear a helmet when riding a bicycle or motorcycle.

## 2024-01-18 ENCOUNTER — CARE COORDINATION (OUTPATIENT)
Facility: CLINIC | Age: 69
End: 2024-01-18

## 2024-01-18 NOTE — CARE COORDINATION
Patient attending appointment with PCP or Specialists at time of scheduled ACM follow up. Follow up call deferred at this time and ACM will contact Patient at the next scheduled encounter unless earlier encounter is necessary.  
[place]

## 2024-01-19 ENCOUNTER — CARE COORDINATION (OUTPATIENT)
Facility: CLINIC | Age: 69
End: 2024-01-19

## 2024-01-19 NOTE — CARE COORDINATION
Care Transitions Follow Up Call  Care Transitions Outreach Attempt    CTN Attempted to reach patient for transitions of care follow up. Unable to reach patient. Unable to leave a voice message \" Recording \" voicemailbox is unavailable.\"    Noted no hospitalization admission post 30 days from discharge date 12/15/23.   Care Transition Program is closed and resolved.    Patient: Jhoana Membreno Patient : 1955 MRN: [id number]    Last Discharge Facility       Date Complaint Diagnosis Description Type Department Provider    23 Nausea & Vomiting; Malaise Hypervolemia, unspecified hypervolemia type ... ED to Hosp-Admission (Discharged) (ADMITTED) [facility] Allan Flowers MD; KEKE Ochoa.Christina          follow up appointment(s):   Future Appointments   Date Time Provider Department Center   2024  2:00 PM Rashid Vila MD [facility] AMB        Cesar Moise RN

## 2024-01-21 ENCOUNTER — APPOINTMENT (OUTPATIENT)
Age: 69
End: 2024-01-21
Payer: MEDICARE

## 2024-01-21 ENCOUNTER — HOSPITAL ENCOUNTER (EMERGENCY)
Age: 69
Discharge: HOME | End: 2024-01-21
Attending: EMERGENCY MEDICINE
Payer: MEDICARE

## 2024-01-21 VITALS
HEIGHT: 61 IN | WEIGHT: 153 LBS | SYSTOLIC BLOOD PRESSURE: 198 MMHG | TEMPERATURE: 98.2 F | DIASTOLIC BLOOD PRESSURE: 82 MMHG | BODY MASS INDEX: 28.89 KG/M2 | RESPIRATION RATE: 16 BRPM | HEART RATE: 80 BPM | OXYGEN SATURATION: 96 %

## 2024-01-21 DIAGNOSIS — B02.29 POSTHERPETIC NEURALGIA: ICD-10-CM

## 2024-01-21 DIAGNOSIS — M79.10 MYALGIA: Primary | ICD-10-CM

## 2024-01-21 LAB
ALBUMIN SERPL-MCNC: 3.8 G/DL (ref 3.4–5)
ALBUMIN/GLOB SERPL: 1.1 (ref 0.8–1.7)
ALP SERPL-CCNC: 106 U/L (ref 45–117)
ALT SERPL-CCNC: 14 U/L (ref 13–56)
ANION GAP SERPL CALC-SCNC: 8 MMOL/L (ref 3–18)
APPEARANCE UR: ABNORMAL
AST SERPL W P-5'-P-CCNC: 15 U/L (ref 10–38)
BACTERIA URNS QL MICRO: ABNORMAL /HPF
BASOPHILS # BLD: 0.1 K/UL (ref 0–0.1)
BASOPHILS NFR BLD: 1 % (ref 0–2)
BILIRUB SERPL-MCNC: 0.6 MG/DL (ref 0.2–1)
BILIRUB UR QL: NEGATIVE
BUN SERPL-MCNC: 38 MG/DL (ref 7–18)
BUN/CREAT SERPL: 7 (ref 12–20)
CA-I BLD-MCNC: 9 MG/DL (ref 8.5–10.1)
CHLORIDE SERPL-SCNC: 105 MMOL/L (ref 100–111)
CO2 SERPL-SCNC: 28 MMOL/L (ref 21–32)
COLOR UR: YELLOW
CREAT SERPL-MCNC: 5.67 MG/DL (ref 0.6–1.3)
DIFFERENTIAL METHOD BLD: ABNORMAL
EOSINOPHIL # BLD: 0.3 K/UL (ref 0–0.4)
EOSINOPHIL NFR BLD: 4 % (ref 0–5)
EPITH CASTS URNS QL MICRO: ABNORMAL /LPF (ref 0–20)
ERYTHROCYTE [DISTWIDTH] IN BLOOD BY AUTOMATED COUNT: 15 % (ref 11.6–14.5)
GLOBULIN SER CALC-MCNC: 3.4 G/DL (ref 2–4)
GLUCOSE SERPL-MCNC: 82 MG/DL (ref 74–99)
GLUCOSE UR STRIP.AUTO-MCNC: NEGATIVE MG/DL
HCT VFR BLD AUTO: 28.1 % (ref 35–45)
HGB BLD-MCNC: 9.2 G/DL (ref 12–16)
HGB UR QL STRIP: ABNORMAL
IMM GRANULOCYTES # BLD AUTO: 0 K/UL (ref 0–0.04)
IMM GRANULOCYTES NFR BLD AUTO: 0 % (ref 0–0.5)
KETONES UR QL STRIP.AUTO: NEGATIVE MG/DL
LEUKOCYTE ESTERASE UR QL STRIP.AUTO: NEGATIVE
LIPASE SERPL-CCNC: 59 U/L (ref 13–75)
LYMPHOCYTES # BLD: 2.3 K/UL (ref 0.9–3.6)
LYMPHOCYTES NFR BLD: 31 % (ref 21–52)
MCH RBC QN AUTO: 33.1 PG (ref 24–34)
MCHC RBC AUTO-ENTMCNC: 32.7 G/DL (ref 31–37)
MCV RBC AUTO: 101.1 FL (ref 78–100)
MONOCYTES # BLD: 0.6 K/UL (ref 0.05–1.2)
MONOCYTES NFR BLD: 8 % (ref 3–10)
NEUTS SEG # BLD: 4.3 K/UL (ref 1.8–8)
NEUTS SEG NFR BLD: 56 % (ref 40–73)
NITRITE UR QL STRIP.AUTO: NEGATIVE
NRBC # BLD: 0 K/UL (ref 0–0.01)
NRBC BLD-RTO: 0 PER 100 WBC
PH UR STRIP: 7.5 (ref 5–8)
PLATELET # BLD AUTO: 175 K/UL (ref 135–420)
PMV BLD AUTO: 9.7 FL (ref 9.2–11.8)
POTASSIUM SERPL-SCNC: 3.9 MMOL/L (ref 3.5–5.5)
PROT SERPL-MCNC: 7.2 G/DL (ref 6.4–8.2)
PROT UR STRIP-MCNC: 300 MG/DL
RBC # BLD AUTO: 2.78 M/UL (ref 4.2–5.3)
RBC #/AREA URNS HPF: ABNORMAL /HPF (ref 0–2)
SODIUM SERPL-SCNC: 141 MMOL/L (ref 136–145)
SP GR UR REFRACTOMETRY: 1.01 (ref 1–1.03)
URINE CULTURE IF INDICATED: ABNORMAL
UROBILINOGEN UR QL STRIP.AUTO: 0.2 EU/DL (ref 0.2–1)
WBC # BLD AUTO: 7.6 K/UL (ref 4.6–13.2)
WBC URNS QL MICRO: ABNORMAL /HPF (ref 0–4)

## 2024-01-21 PROCEDURE — 83690 ASSAY OF LIPASE: CPT

## 2024-01-21 PROCEDURE — 96375 TX/PRO/DX INJ NEW DRUG ADDON: CPT

## 2024-01-21 PROCEDURE — 6360000002 HC RX W HCPCS: Performed by: EMERGENCY MEDICINE

## 2024-01-21 PROCEDURE — 80053 COMPREHEN METABOLIC PANEL: CPT

## 2024-01-21 PROCEDURE — 96374 THER/PROPH/DIAG INJ IV PUSH: CPT

## 2024-01-21 PROCEDURE — 81001 URINALYSIS AUTO W/SCOPE: CPT

## 2024-01-21 PROCEDURE — 99284 EMERGENCY DEPT VISIT MOD MDM: CPT

## 2024-01-21 PROCEDURE — 74176 CT ABD & PELVIS W/O CONTRAST: CPT

## 2024-01-21 PROCEDURE — 85025 COMPLETE CBC W/AUTO DIFF WBC: CPT

## 2024-01-21 RX ORDER — ONDANSETRON 2 MG/ML
4 INJECTION INTRAMUSCULAR; INTRAVENOUS
Status: COMPLETED | OUTPATIENT
Start: 2024-01-21 | End: 2024-01-21

## 2024-01-21 RX ORDER — FENTANYL CITRATE 50 UG/ML
50 INJECTION, SOLUTION INTRAMUSCULAR; INTRAVENOUS
Status: COMPLETED | OUTPATIENT
Start: 2024-01-21 | End: 2024-01-21

## 2024-01-21 RX ORDER — ASPIRIN 81 MG
1 TABLET,CHEWABLE ORAL NIGHTLY
Qty: 60 G | Refills: 0 | Status: SHIPPED | OUTPATIENT
Start: 2024-01-21

## 2024-01-21 RX ORDER — GABAPENTIN 100 MG/1
300 CAPSULE ORAL
Status: DISCONTINUED | OUTPATIENT
Start: 2024-01-21 | End: 2024-01-21

## 2024-01-21 RX ORDER — HYDROCODONE BITARTRATE AND ACETAMINOPHEN 5; 325 MG/1; MG/1
1 TABLET ORAL EVERY 6 HOURS PRN
Qty: 10 TABLET | Refills: 0 | Status: SHIPPED | OUTPATIENT
Start: 2024-01-21 | End: 2024-01-24

## 2024-01-21 RX ORDER — HYDROMORPHONE HYDROCHLORIDE 1 MG/ML
0.5 INJECTION, SOLUTION INTRAMUSCULAR; INTRAVENOUS; SUBCUTANEOUS
Status: COMPLETED | OUTPATIENT
Start: 2024-01-21 | End: 2024-01-21

## 2024-01-21 RX ADMIN — FENTANYL CITRATE 50 MCG: 50 INJECTION INTRAMUSCULAR; INTRAVENOUS at 09:48

## 2024-01-21 RX ADMIN — ONDANSETRON 4 MG: 2 INJECTION INTRAMUSCULAR; INTRAVENOUS at 09:50

## 2024-01-21 RX ADMIN — HYDROMORPHONE HYDROCHLORIDE 0.5 MG: 1 INJECTION, SOLUTION INTRAMUSCULAR; INTRAVENOUS; SUBCUTANEOUS at 11:05

## 2024-01-21 ASSESSMENT — PAIN SCALES - GENERAL: PAINLEVEL_OUTOF10: 10

## 2024-01-21 NOTE — ED TRIAGE NOTES
Pt report left side abdominal/ flank pain for a day. Pt took tylenol for pain.  Pt is on dialysis and stated she's hard to start IV's on, ultrasound used in the past.

## 2024-01-21 NOTE — ED PROVIDER NOTES
Height: 1.549 m (5' 1\")         Patient was given the following medications:  Medications   ondansetron (ZOFRAN) injection 4 mg (4 mg IntraVENous Given 1/21/24 0950)   fentaNYL (SUBLIMAZE) injection 50 mcg (50 mcg IntraVENous Given 1/21/24 0948)   HYDROmorphone HCl PF (DILAUDID) injection 0.5 mg (0.5 mg IntraVENous Given 1/21/24 1105)       CONSULTS: (Who and What was discussed)  None    Chronic Conditions: As above    Social Determinants affecting Dx or Tx:  None    Records Reviewed (source and summary): Old medical records.  Nursing notes.  Previous radiology studies.      CC/HPI Summary, DDx, ED Course, and Reassessment:   Patient presents with left upper quadrant pain which began yesterday.  She reports pain is in same area is shingles which she had approximately 8 months ago.  Quality and location of pain same.  She also has history of pancreatitis but she reports pain is different.  She appears in some pain discomfort.  She is tender left upper quadrant with voluntary guarding, no rebound.  There is no rash.  Differential includes pancreatitis, postherpetic neuralgia, malignancy, pneumonia, PE, diverticulitis, pyelonephritis, radiculopathy, AAA  Patient evaluated with CBC, CMP, lipase, CT scan abdomen which only showed incidental finding of left kidney cyst and some mild diverticulosis.  Patient's pain controlled with fentanyl and Dilaudid.  Patient discharged on Norco and capsaicin cream.  She was given precautions for returning to ED otherwise to follow-up with PCP.      Disposition Considerations (Tests not done, Shared Decision Making, Pt Expectation of Test or Tx.):      FINAL IMPRESSION     Postherpatic neuralgia     DISPOSITION/PLAN   DISPOSITION Decision To Discharge 01/21/2024 11:40:05 AM      Discharged     PATIENT REFERRED TO:  Rashid Vila MD  [address]  [place] [address]  [phone]    Schedule an appointment as soon as possible for a visit       [facility] EMERGENCY

## 2024-01-22 ENCOUNTER — CARE COORDINATION (OUTPATIENT)
Facility: CLINIC | Age: 69
End: 2024-01-22

## 2024-01-22 NOTE — CARE COORDINATION
Attempted to contact patient for CCM encounter / follow up. No answer. Message left with ACM contact information provided. Will attempt to contact at a later time.

## 2024-01-25 ENCOUNTER — APPOINTMENT (OUTPATIENT)
Age: 69
End: 2024-01-25
Payer: MEDICARE

## 2024-01-25 ENCOUNTER — HOSPITAL ENCOUNTER (EMERGENCY)
Age: 69
Discharge: HOME | End: 2024-01-25
Attending: EMERGENCY MEDICINE
Payer: MEDICARE

## 2024-01-25 VITALS
OXYGEN SATURATION: 93 % | HEIGHT: 61 IN | TEMPERATURE: 98.1 F | WEIGHT: 153 LBS | RESPIRATION RATE: 17 BRPM | HEART RATE: 102 BPM | BODY MASS INDEX: 28.89 KG/M2 | SYSTOLIC BLOOD PRESSURE: 205 MMHG | DIASTOLIC BLOOD PRESSURE: 78 MMHG

## 2024-01-25 DIAGNOSIS — B02.29 POST HERPETIC NEURALGIA: Primary | ICD-10-CM

## 2024-01-25 LAB
ANION GAP SERPL CALC-SCNC: 10 MMOL/L (ref 3–18)
BASOPHILS # BLD: 0.1 K/UL (ref 0–0.1)
BASOPHILS NFR BLD: 1 % (ref 0–2)
BUN SERPL-MCNC: 56 MG/DL (ref 7–18)
BUN/CREAT SERPL: 7 (ref 12–20)
CA-I BLD-MCNC: 9.1 MG/DL (ref 8.5–10.1)
CHLORIDE SERPL-SCNC: 106 MMOL/L (ref 100–111)
CO2 SERPL-SCNC: 26 MMOL/L (ref 21–32)
CREAT SERPL-MCNC: 8.57 MG/DL (ref 0.6–1.3)
DIFFERENTIAL METHOD BLD: ABNORMAL
EKG ATRIAL RATE: 101 BPM
EKG DIAGNOSIS: NORMAL
EKG P AXIS: 57 DEGREES
EKG P-R INTERVAL: 164 MS
EKG Q-T INTERVAL: 368 MS
EKG QRS DURATION: 78 MS
EKG QTC CALCULATION (BAZETT): 477 MS
EKG R AXIS: 54 DEGREES
EKG T AXIS: 62 DEGREES
EKG VENTRICULAR RATE: 101 BPM
EOSINOPHIL # BLD: 0.3 K/UL (ref 0–0.4)
EOSINOPHIL NFR BLD: 3 % (ref 0–5)
ERYTHROCYTE [DISTWIDTH] IN BLOOD BY AUTOMATED COUNT: 14.9 % (ref 11.6–14.5)
GLUCOSE SERPL-MCNC: 88 MG/DL (ref 74–99)
HCT VFR BLD AUTO: 28.8 % (ref 35–45)
HGB BLD-MCNC: 9.4 G/DL (ref 12–16)
IMM GRANULOCYTES # BLD AUTO: 0 K/UL (ref 0–0.04)
IMM GRANULOCYTES NFR BLD AUTO: 0 % (ref 0–0.5)
LYMPHOCYTES # BLD: 2.3 K/UL (ref 0.9–3.6)
LYMPHOCYTES NFR BLD: 20 % (ref 21–52)
MCH RBC QN AUTO: 33.5 PG (ref 24–34)
MCHC RBC AUTO-ENTMCNC: 32.6 G/DL (ref 31–37)
MCV RBC AUTO: 102.5 FL (ref 78–100)
MONOCYTES # BLD: 0.8 K/UL (ref 0.05–1.2)
MONOCYTES NFR BLD: 7 % (ref 3–10)
NEUTS SEG # BLD: 7.9 K/UL (ref 1.8–8)
NEUTS SEG NFR BLD: 69 % (ref 40–73)
NRBC # BLD: 0 K/UL (ref 0–0.01)
NRBC BLD-RTO: 0 PER 100 WBC
PLATELET # BLD AUTO: 208 K/UL (ref 135–420)
PMV BLD AUTO: 10.1 FL (ref 9.2–11.8)
POTASSIUM SERPL-SCNC: 4.6 MMOL/L (ref 3.5–5.5)
RBC # BLD AUTO: 2.81 M/UL (ref 4.2–5.3)
SODIUM SERPL-SCNC: 142 MMOL/L (ref 136–145)
TROPONIN I SERPL HS-MCNC: 51 NG/L (ref 0–54)
TROPONIN I SERPL HS-MCNC: 69 NG/L (ref 0–54)
WBC # BLD AUTO: 11.5 K/UL (ref 4.6–13.2)

## 2024-01-25 PROCEDURE — 93005 ELECTROCARDIOGRAM TRACING: CPT | Performed by: EMERGENCY MEDICINE

## 2024-01-25 PROCEDURE — 71046 X-RAY EXAM CHEST 2 VIEWS: CPT

## 2024-01-25 PROCEDURE — 80048 BASIC METABOLIC PNL TOTAL CA: CPT

## 2024-01-25 PROCEDURE — 94762 N-INVAS EAR/PLS OXIMTRY CONT: CPT

## 2024-01-25 PROCEDURE — 6370000000 HC RX 637 (ALT 250 FOR IP): Performed by: EMERGENCY MEDICINE

## 2024-01-25 PROCEDURE — 6360000002 HC RX W HCPCS: Performed by: EMERGENCY MEDICINE

## 2024-01-25 PROCEDURE — 85025 COMPLETE CBC W/AUTO DIFF WBC: CPT

## 2024-01-25 PROCEDURE — 99285 EMERGENCY DEPT VISIT HI MDM: CPT

## 2024-01-25 PROCEDURE — 84484 ASSAY OF TROPONIN QUANT: CPT

## 2024-01-25 PROCEDURE — 96374 THER/PROPH/DIAG INJ IV PUSH: CPT

## 2024-01-25 RX ORDER — 0.9 % SODIUM CHLORIDE 0.9 %
1000 INTRAVENOUS SOLUTION INTRAVENOUS ONCE
Status: DISCONTINUED | OUTPATIENT
Start: 2024-01-25 | End: 2024-01-25

## 2024-01-25 RX ORDER — HYDROMORPHONE HYDROCHLORIDE 2 MG/1
1 TABLET ORAL
Status: COMPLETED | OUTPATIENT
Start: 2024-01-25 | End: 2024-01-25

## 2024-01-25 RX ORDER — GABAPENTIN 100 MG/1
100 CAPSULE ORAL
Status: COMPLETED | OUTPATIENT
Start: 2024-01-25 | End: 2024-01-25

## 2024-01-25 RX ORDER — GABAPENTIN 100 MG/1
100 CAPSULE ORAL 3 TIMES DAILY
Qty: 30 CAPSULE | Refills: 0 | Status: SHIPPED | OUTPATIENT
Start: 2024-01-25 | End: 2024-02-04

## 2024-01-25 RX ORDER — LIDOCAINE 4 G/G
1 PATCH TOPICAL
Status: DISCONTINUED | OUTPATIENT
Start: 2024-01-25 | End: 2024-01-26 | Stop reason: HOSPADM

## 2024-01-25 RX ORDER — HYDROMORPHONE HYDROCHLORIDE 1 MG/ML
0.5 INJECTION, SOLUTION INTRAMUSCULAR; INTRAVENOUS; SUBCUTANEOUS
Status: COMPLETED | OUTPATIENT
Start: 2024-01-25 | End: 2024-01-25

## 2024-01-25 RX ADMIN — HYDROMORPHONE HYDROCHLORIDE 1 MG: 2 TABLET ORAL at 20:13

## 2024-01-25 RX ADMIN — GABAPENTIN 100 MG: 100 CAPSULE ORAL at 21:53

## 2024-01-25 RX ADMIN — HYDROMORPHONE HYDROCHLORIDE 0.5 MG: 1 INJECTION, SOLUTION INTRAMUSCULAR; INTRAVENOUS; SUBCUTANEOUS at 19:14

## 2024-01-25 ASSESSMENT — PAIN DESCRIPTION - DESCRIPTORS: DESCRIPTORS: SHARP;STABBING

## 2024-01-25 ASSESSMENT — PAIN SCALES - GENERAL
PAINLEVEL_OUTOF10: 10
PAINLEVEL_OUTOF10: 10
PAINLEVEL_OUTOF10: 7
PAINLEVEL_OUTOF10: 10

## 2024-01-25 ASSESSMENT — PAIN DESCRIPTION - ORIENTATION
ORIENTATION: LEFT

## 2024-01-25 ASSESSMENT — PAIN - FUNCTIONAL ASSESSMENT: PAIN_FUNCTIONAL_ASSESSMENT: 0-10

## 2024-01-25 ASSESSMENT — PAIN DESCRIPTION - LOCATION
LOCATION: FLANK

## 2024-01-25 NOTE — ED PROVIDER NOTES
[facility] EMERGENCY DEPT  EMERGENCY DEPARTMENT HISTORY AND PHYSICAL EXAM      Date: 1/25/2024  Patient Name: Jhoana Membreno  MRN: [id number]  Birthdate 1955  Date of evaluation: 1/25/2024  Provider: Hector Culp MD   Note Started: 4:47 PM EST 1/25/24    HISTORY OF PRESENT ILLNESS     Chief Complaint   Patient presents with    Pain       History Provided By: Patient    HPI: Jhoana Membreno is a 68 y.o. female       presenting to ed for left side and chest pain pain. States was told was herpetic neuropathy. Reports pain on left side. Tearful, crying on bed.     Reports nausea, no vomiting, no sob.     Has ESRD. No sob.     PAST MEDICAL HISTORY   Past Medical History:  Past Medical History:   Diagnosis Date    JULIET (acute kidney injury) ([facility]) 05/09/2019    Anxiety     Arrhythmia     Arthritis     Asthma     Asthma     Burning with urination     frequent uti    Calculus of gallbladder with acute cholecystitis without obstruction 10/09/2020    Cholecystitis 01/12/2019    Chronic kidney disease     dialysis    CMV (cytomegalovirus) antibody positive     Colitis 09/10/2022    COPD (chronic obstructive pulmonary disease) ([facility])     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    Dialysis patient ([facility])     M/W/F    Diverticular disease of colon 08/21/2013    Dyspepsia and other specified disorders of function of stomach     stomach ulcer    Elevated troponin 10/21/2019    Encounter for cholecystectomy 05/06/2021 7/2020    Essential hypertension     GERD (gastroesophageal reflux disease)     History of chemotherapy     History of renal transplant 08/21/2013    (LD 2/12/2002)    HLD (hyperlipidemia)     Hypercholesteremia     Hypertension     Hypoglycemia 02/14/2023    Hypothyroidism 03/23/2022    Irritable bowel syndrome     Kidney transplant rejection     Menopause     Migraine     Obesity     Osteoporosis     Pancreatitis 08/28/2022    Pyelonephritis 07/13/2020    Recurrent urinary tract infection 09/27/2016    Renal cyst 2002

## 2024-01-25 NOTE — ED NOTES
Patient continues to get out of bed to sit in chair, encouraged patient to stay in bed and placed vital sign bp cuff and pulse ox back on patient.

## 2024-01-25 NOTE — ED TRIAGE NOTES
Since last Sunday patient with right side/skin pain due to hx of shingle. Was told it was nerve pain.     Dialysis patient, Tuesday, Thursday and Saturdays      Tearful, crying during assessment, rubbing left side of abdomen

## 2024-01-26 NOTE — ED NOTES
Received care of patient from previous shift. Continues to complain of pain rates  over 10 on scale 10/10. During assessment reports pain in chest that has just started. Reports history of pacemaker, describes as aching in mid chest. No other symptoms besides pain in left flank  EKG completed and given to Dr Culp for evaluation

## 2024-01-26 NOTE — ED NOTES
States pain has improved now is 10/10, previously pain was over 10/10. Blood pressure continues to be elevated, states has been taking medications as prescribed has to take another dose prior to bed.

## 2024-01-26 NOTE — ED NOTES
I have reviewed discharge instructions with the patient.  The patient verbalized understanding.         Reviewed medication compliance, follow up with PCP, return to ER for any new or worrisome concerns    Instructed on removal of Lidocaine patch @ 0730

## 2024-01-26 NOTE — DISCHARGE INSTRUCTIONS
Make sure you take your nighttime blood pressure medicines when you get home tonight.      Neurontin is a neuropathic pain medication.  The prescription that I have written for you is different than the way I want you to take it.  Start off with taking 100 mg at bedtime.  If after 2 days you are still continuing to have pain and discomfort you can increase dosing to once in the morning and once at night or double the dose at bedtime.  A third dose can be added after a few days if you are tolerating the medicine he continued to have pain and can be taken as 1 in the morning 1 in the afternoon or 1 at night you may take 1 in the morning and 2 at night or take 3 at bedtime.  For any further dosing it should be done by your primary care physician or physician that we have asked you to follow-up with.

## 2024-01-29 ENCOUNTER — CARE COORDINATION (OUTPATIENT)
Facility: CLINIC | Age: 69
End: 2024-01-29

## 2024-01-29 NOTE — CARE COORDINATION
Patient has graduated from the Complex Case Management  program on 1/29/2024.  Patient/family has the ability to self-manage at this time.  Care management goals have been completed. No further Ambulatory Care Manager follow up scheduled.     Goals Addressed                   This Visit's Progress     COMPLETED: Attend follow up appointments on schedule        COMPLETED: Prepare patients and caregivers for end of life decisions (ie. need for hospice, pain management, symptom relief, advance directives etc.)        COMPLETED: Take all medications as ordered               Patient has Ambulatory Care Manager's contact information for any further questions, concerns, or needs.  Patients upcoming visits:    Future Appointments   Date Time Provider Department Center   4/17/2024  2:00 PM Rashid Vila MD [facility] AMB

## 2024-01-31 ENCOUNTER — OFFICE VISIT (OUTPATIENT)
Dept: FAMILY MEDICINE CLINIC | Facility: CLINIC | Age: 69
End: 2024-01-31
Payer: MEDICARE

## 2024-01-31 ENCOUNTER — TELEPHONE (OUTPATIENT)
Facility: CLINIC | Age: 69
End: 2024-01-31

## 2024-01-31 VITALS
OXYGEN SATURATION: 97 % | SYSTOLIC BLOOD PRESSURE: 183 MMHG | DIASTOLIC BLOOD PRESSURE: 78 MMHG | HEIGHT: 61 IN | WEIGHT: 158.6 LBS | TEMPERATURE: 97.5 F | HEART RATE: 92 BPM | BODY MASS INDEX: 29.94 KG/M2

## 2024-01-31 DIAGNOSIS — B02.29 POST HERPETIC NEURALGIA: Primary | ICD-10-CM

## 2024-01-31 PROCEDURE — 3077F SYST BP >= 140 MM HG: CPT

## 2024-01-31 PROCEDURE — 3078F DIAST BP <80 MM HG: CPT

## 2024-01-31 PROCEDURE — G8427 DOCREV CUR MEDS BY ELIG CLIN: HCPCS

## 2024-01-31 PROCEDURE — G8484 FLU IMMUNIZE NO ADMIN: HCPCS

## 2024-01-31 PROCEDURE — 1123F ACP DISCUSS/DSCN MKR DOCD: CPT

## 2024-01-31 PROCEDURE — G8417 CALC BMI ABV UP PARAM F/U: HCPCS

## 2024-01-31 PROCEDURE — 1036F TOBACCO NON-USER: CPT

## 2024-01-31 PROCEDURE — 99213 OFFICE O/P EST LOW 20 MIN: CPT

## 2024-01-31 PROCEDURE — 1090F PRES/ABSN URINE INCON ASSESS: CPT

## 2024-01-31 PROCEDURE — 3017F COLORECTAL CA SCREEN DOC REV: CPT

## 2024-01-31 PROCEDURE — G8399 PT W/DXA RESULTS DOCUMENT: HCPCS

## 2024-01-31 RX ORDER — LIDOCAINE 50 MG/G
1 PATCH TOPICAL DAILY
Qty: 10 PATCH | Refills: 0 | Status: SHIPPED | OUTPATIENT
Start: 2024-01-31 | End: 2024-02-10

## 2024-01-31 RX ORDER — LIDOCAINE 4 G/G
1 PATCH TOPICAL DAILY
Qty: 14 EACH | Refills: 0 | Status: CANCELLED | OUTPATIENT
Start: 2024-01-31 | End: 2024-02-14

## 2024-01-31 RX ORDER — LIDOCAINE 50 MG/G
PATCH TOPICAL
Qty: 10 PATCH | Refills: 0 | Status: CANCELLED | OUTPATIENT
Start: 2024-01-31

## 2024-01-31 RX ORDER — LIDOCAINE 50 MG/G
1 PATCH TOPICAL DAILY
Qty: 10 PATCH | Refills: 0 | Status: CANCELLED | OUTPATIENT
Start: 2024-01-31 | End: 2024-02-10

## 2024-01-31 RX ORDER — LIDOCAINE 50 MG/G
1 PATCH TOPICAL DAILY
Qty: 14 PATCH | Refills: 0 | Status: CANCELLED | OUTPATIENT
Start: 2024-01-31 | End: 2024-02-14

## 2024-01-31 ASSESSMENT — ENCOUNTER SYMPTOMS
GASTROINTESTINAL NEGATIVE: 1
ALLERGIC/IMMUNOLOGIC NEGATIVE: 1
EYES NEGATIVE: 1
RESPIRATORY NEGATIVE: 1

## 2024-01-31 NOTE — PROGRESS NOTES
Jhoana Membreno presents today for   Chief Complaint   Patient presents with    Herpes Zoster     Left side  Patient states she is not sleeping because of the pain.        Is someone accompanying this pt? No     Is the patient using any DME equipment during OV? No     Depression Screenin/17/2024     2:58 PM 2023     3:07 PM 2023    11:53 AM 11/3/2023     4:16 PM 10/26/2023     3:15 PM 10/18/2023     1:26 PM 2023     3:52 PM   PHQ-9 Questionaire   Little interest or pleasure in doing things 0 1 0 0 0 0 0   Feeling down, depressed, or hopeless 0 1 1 1 1 1 0   Trouble falling or staying asleep, or sleeping too much 0    1 0    Feeling tired or having little energy 0    3 0    Poor appetite or overeating 0    1 0    Feeling bad about yourself - or that you are a failure or have let yourself or your family down 0    0 0    Trouble concentrating on things, such as reading the newspaper or watching television 0    0 0    Moving or speaking so slowly that other people could have noticed. Or the opposite - being so fidgety or restless that you have been moving around a lot more than usual 0    0 0    Thoughts that you would be better off dead, or of hurting yourself in some way 0    0 0    PHQ-9 Total Score 0 2 1 1 6 1 0   If you checked off any problems, how difficult have these problems made it for you to do your work, take care of things at home, or get along with other people? 0    0 0        Fall Risk      2024     2:58 PM 10/26/2023     3:16 PM 10/18/2023     1:27 PM 2023     1:34 PM 2023     2:32 PM 2023     1:06 PM 2023     1:15 PM   Fall Risk   2 or more falls in past year? no no no no no no no   Fall with injury in past year? no no no no no no no        Health Maintenance reviewed and discussed and ordered per Provider.    Health Maintenance Due   Topic Date Due    DTaP/Tdap/Td vaccine (1 - Tdap) Never done    Respiratory Syncytial Virus (RSV) Pregnant or age 60 
MG tablet  Commonly known as: REMERON  Take 1 tablet by mouth nightly     montelukast 10 MG tablet  Commonly known as: SINGULAIR  TAKE 1 TABLET EVERY MORNING     omeprazole 40 MG delayed release capsule  Commonly known as: PRILOSEC  Take 1 capsule by mouth every morning (before breakfast)     ondansetron 4 MG tablet  Commonly known as: ZOFRAN  Take 1 tablet by mouth 3 times daily as needed for Nausea or Vomiting     polyethylene glycol 17 GM/SCOOP powder  Commonly known as: GLYCOLAX     predniSONE 5 MG tablet  Commonly known as: DELTASONE  Take 1 tablet by mouth daily     RenaPlex-D Tabs     sevelamer 800 MG tablet  Commonly known as: RENVELA     simvastatin 20 MG tablet  Commonly known as: ZOCOR  TAKE 1 TABLET EVERY DAY AS DIRECTED     Trelegy Ellipta 100-62.5-25 MCG/ACT Aepb inhaler  Generic drug: fluticasone-umeclidin-vilant  INHALE 1 PUFF EVERY DAY            STOP taking these medications      gabapentin 100 MG capsule  Commonly known as: NEURONTIN  Stopped by: LISA Harley NP, APRN - NP                          Disclaimer:    I have discussed the diagnosis with the patient and the intended plan as seen above.The patient understands our medical plan. The risks, benefits and significant side effects of all medications have been reviewed. Anticipated time course and progression of condition reviewed. All questions have been addressed.  She received an after visit summary, with information reviewed, and questions answered.      Where appropriate, she is instructed to call the clinic if she has not been [facility] by phone or [facility] the results of her tests or with an appointment plan for any referrals within 1 week(s). The patient  is to call if her condition worsens or fails to improve or if significant side effects are experienced.       LISA Harley NP   
known as: ESTRACE     fluticasone 50 MCG/ACT nasal spray  Commonly known as: FLONASE  USE 1 SPRAY IN EACH NOSTRIL EVERY MORNING     hydrALAZINE 25 MG tablet  Commonly known as: APRESOLINE  Take 1 tablet by mouth 3 times daily as needed (hypertension >160/100)     levothyroxine 75 MCG tablet  Commonly known as: SYNTHROID  TAKE 1 TABLET EVERY DAY BEFORE BREAKFAST     meclizine 25 MG tablet  Commonly known as: ANTIVERT  TAKE 1 TABLET THREE TIMES DAILY AS NEEDED FOR DIZZINESS     mirtazapine 15 MG tablet  Commonly known as: REMERON  Take 1 tablet by mouth nightly     montelukast 10 MG tablet  Commonly known as: SINGULAIR  TAKE 1 TABLET EVERY MORNING     omeprazole 40 MG delayed release capsule  Commonly known as: PRILOSEC  Take 1 capsule by mouth every morning (before breakfast)     ondansetron 4 MG tablet  Commonly known as: ZOFRAN  Take 1 tablet by mouth 3 times daily as needed for Nausea or Vomiting     polyethylene glycol 17 GM/SCOOP powder  Commonly known as: GLYCOLAX     predniSONE 5 MG tablet  Commonly known as: DELTASONE  Take 1 tablet by mouth daily     RenaPlex-D Tabs     sevelamer 800 MG tablet  Commonly known as: RENVELA     simvastatin 20 MG tablet  Commonly known as: ZOCOR  TAKE 1 TABLET EVERY DAY AS DIRECTED     Trelegy Ellipta 100-62.5-25 MCG/ACT Aepb inhaler  Generic drug: fluticasone-umeclidin-vilant  INHALE 1 PUFF EVERY DAY            STOP taking these medications      gabapentin 100 MG capsule  Commonly known as: NEURONTIN  Stopped by: LISA Harley NP               Where to Get Your Medications        These medications were sent to [facility] - [address] [phone] - F [phone]  [address] [place] [id number]      Phone: [phone]   lidocaine 5 %         LISA Harley NP                          Disclaimer:    I have discussed the diagnosis with the patient and the intended plan as seen above.The patient understands our

## 2024-01-31 NOTE — TELEPHONE ENCOUNTER
Patient called wanting a same day. Stating she has shingles again and she is unable to take gabapentin. There were no appointments available transferred to [place] office.

## 2024-02-04 ENCOUNTER — HOSPITAL ENCOUNTER (EMERGENCY)
Age: 69
Discharge: HOME | End: 2024-02-04
Attending: EMERGENCY MEDICINE
Payer: MEDICARE

## 2024-02-04 ENCOUNTER — APPOINTMENT (OUTPATIENT)
Age: 69
End: 2024-02-04
Payer: MEDICARE

## 2024-02-04 VITALS
TEMPERATURE: 99.1 F | BODY MASS INDEX: 28.7 KG/M2 | WEIGHT: 152 LBS | DIASTOLIC BLOOD PRESSURE: 76 MMHG | RESPIRATION RATE: 12 BRPM | SYSTOLIC BLOOD PRESSURE: 195 MMHG | OXYGEN SATURATION: 100 % | HEART RATE: 83 BPM | HEIGHT: 61 IN

## 2024-02-04 DIAGNOSIS — Z99.2 END-STAGE RENAL DISEASE ON HEMODIALYSIS (HCC): Primary | ICD-10-CM

## 2024-02-04 DIAGNOSIS — I10 ESSENTIAL HYPERTENSION: ICD-10-CM

## 2024-02-04 DIAGNOSIS — N18.6 END-STAGE RENAL DISEASE ON HEMODIALYSIS (HCC): Primary | ICD-10-CM

## 2024-02-04 DIAGNOSIS — R06.00 DYSPNEA, UNSPECIFIED TYPE: ICD-10-CM

## 2024-02-04 LAB
ALBUMIN SERPL-MCNC: 4 G/DL (ref 3.4–5)
ALBUMIN/GLOB SERPL: 1.1 (ref 0.8–1.7)
ALP SERPL-CCNC: 114 U/L (ref 45–117)
ALT SERPL-CCNC: 32 U/L (ref 13–56)
ANION GAP SERPL CALC-SCNC: 10 MMOL/L (ref 3–18)
AST SERPL W P-5'-P-CCNC: 24 U/L (ref 10–38)
BASOPHILS # BLD: 0.1 K/UL (ref 0–0.1)
BASOPHILS NFR BLD: 1 % (ref 0–2)
BILIRUB SERPL-MCNC: 0.7 MG/DL (ref 0.2–1)
BNP SERPL-MCNC: ABNORMAL PG/ML (ref 0–900)
BUN SERPL-MCNC: 30 MG/DL (ref 7–18)
BUN/CREAT SERPL: 6 (ref 12–20)
CA-I BLD-MCNC: 9.2 MG/DL (ref 8.5–10.1)
CHLORIDE SERPL-SCNC: 106 MMOL/L (ref 100–111)
CO2 SERPL-SCNC: 26 MMOL/L (ref 21–32)
CREAT SERPL-MCNC: 5.19 MG/DL (ref 0.6–1.3)
DIFFERENTIAL METHOD BLD: ABNORMAL
EOSINOPHIL # BLD: 0.2 K/UL (ref 0–0.4)
EOSINOPHIL NFR BLD: 2 % (ref 0–5)
ERYTHROCYTE [DISTWIDTH] IN BLOOD BY AUTOMATED COUNT: 13.9 % (ref 11.6–14.5)
GLOBULIN SER CALC-MCNC: 3.6 G/DL (ref 2–4)
GLUCOSE SERPL-MCNC: 82 MG/DL (ref 74–99)
HCT VFR BLD AUTO: 25.1 % (ref 35–45)
HGB BLD-MCNC: 8.2 G/DL (ref 12–16)
IMM GRANULOCYTES # BLD AUTO: 0.1 K/UL (ref 0–0.04)
IMM GRANULOCYTES NFR BLD AUTO: 1 % (ref 0–0.5)
LYMPHOCYTES # BLD: 2 K/UL (ref 0.9–3.6)
LYMPHOCYTES NFR BLD: 20 % (ref 21–52)
MAGNESIUM SERPL-MCNC: 2.4 MG/DL (ref 1.6–2.6)
MCH RBC QN AUTO: 33.5 PG (ref 24–34)
MCHC RBC AUTO-ENTMCNC: 32.7 G/DL (ref 31–37)
MCV RBC AUTO: 102.4 FL (ref 78–100)
MONOCYTES # BLD: 0.6 K/UL (ref 0.05–1.2)
MONOCYTES NFR BLD: 6 % (ref 3–10)
NEUTS SEG # BLD: 7 K/UL (ref 1.8–8)
NEUTS SEG NFR BLD: 70 % (ref 40–73)
NRBC # BLD: 0 K/UL (ref 0–0.01)
NRBC BLD-RTO: 0 PER 100 WBC
PHOSPHATE SERPL-MCNC: 3 MG/DL (ref 2.5–4.9)
PLATELET # BLD AUTO: 236 K/UL (ref 135–420)
PMV BLD AUTO: 10.3 FL (ref 9.2–11.8)
POTASSIUM SERPL-SCNC: 4.3 MMOL/L (ref 3.5–5.5)
PROT SERPL-MCNC: 7.6 G/DL (ref 6.4–8.2)
RBC # BLD AUTO: 2.45 M/UL (ref 4.2–5.3)
RBC MORPH BLD: ABNORMAL
RBC MORPH BLD: ABNORMAL
SODIUM SERPL-SCNC: 142 MMOL/L (ref 136–145)
TROPONIN I SERPL HS-MCNC: 53 NG/L (ref 0–54)
TROPONIN I SERPL HS-MCNC: 53 NG/L (ref 0–54)
WBC # BLD AUTO: 10 K/UL (ref 4.6–13.2)

## 2024-02-04 PROCEDURE — 93005 ELECTROCARDIOGRAM TRACING: CPT | Performed by: EMERGENCY MEDICINE

## 2024-02-04 PROCEDURE — 71045 X-RAY EXAM CHEST 1 VIEW: CPT

## 2024-02-04 PROCEDURE — 94618 PULMONARY STRESS TESTING: CPT

## 2024-02-04 PROCEDURE — 99285 EMERGENCY DEPT VISIT HI MDM: CPT

## 2024-02-04 PROCEDURE — 84484 ASSAY OF TROPONIN QUANT: CPT

## 2024-02-04 PROCEDURE — 85025 COMPLETE CBC W/AUTO DIFF WBC: CPT

## 2024-02-04 PROCEDURE — 83735 ASSAY OF MAGNESIUM: CPT

## 2024-02-04 PROCEDURE — 80053 COMPREHEN METABOLIC PANEL: CPT

## 2024-02-04 PROCEDURE — 84100 ASSAY OF PHOSPHORUS: CPT

## 2024-02-04 PROCEDURE — 83880 ASSAY OF NATRIURETIC PEPTIDE: CPT

## 2024-02-04 ASSESSMENT — PAIN SCALES - GENERAL: PAINLEVEL_OUTOF10: 8

## 2024-02-04 ASSESSMENT — PAIN DESCRIPTION - LOCATION: LOCATION: OTHER (COMMENT)

## 2024-02-04 ASSESSMENT — PAIN - FUNCTIONAL ASSESSMENT: PAIN_FUNCTIONAL_ASSESSMENT: 0-10

## 2024-02-04 NOTE — ED PROVIDER NOTES
drink     Frequency of Binge Drinking: Never   Financial Resource Strain: Patient Declined (7/26/2023)    Overall Financial Resource Strain (CARDIA)     Difficulty of Paying Living Expenses: Patient declined   Food Insecurity: No Food Insecurity (12/13/2023)    Hunger Vital Sign     Worried About Running Out of Food in the Last Year: Never true     Ran Out of Food in the Last Year: Never true   Transportation Needs: No Transportation Needs (12/13/2023)    PRAPARE - Transportation     Lack of Transportation (Medical): No     Lack of Transportation (Non-Medical): No   Physical Activity: Inactive (1/17/2024)    Exercise Vital Sign     Days of Exercise per Week: 0 days     Minutes of Exercise per Session: 0 min   Stress: Not on file   Social Connections: Not on file   Intimate Partner Violence: Not on file   Depression: Not at risk (1/17/2024)    PHQ-2     PHQ-2 Score: 0   Recent Concern: Depression - At risk (10/26/2023)    PHQ-2     PHQ-2 Score: 6   Housing Stability: Low Risk  (12/13/2023)    Housing Stability Vital Sign     Unable to Pay for Housing in the Last Year: No     Number of Places Lived in the Last Year: 1     Unstable Housing in the Last Year: No   Interpersonal Safety: Not on file (12/13/2023)   Utilities: Not At Risk (12/13/2023)    [facility] Utilities     Threatened with loss of utilities: No       Review of Systems     Negative except as listed above in HPI.    Physical Exam     Vitals:    02/04/24 1305 02/04/24 1318 02/04/24 1328 02/04/24 1338   BP:  (!) 195/78 (!) 194/86    Pulse: 83 84 85 81   Resp: 11 12 16 11   Temp:       TempSrc:       SpO2: 99% 99% 100% 98%   Weight:       Height:           Constitutional: 68-year-old -American female well nourished, well developed, appears stated age. Alert and oriented.  HEENT: Normal cephalic/atraumatic.  neck supple without meningismus. PERRLA, no conjunctival injection. EOM intact, sclera anicteric.  Pharynx clear without exudates.  Cardiovascular: RRR,

## 2024-02-04 NOTE — PROGRESS NOTES
6min modified walk done, pt was only able to walk 1/2 the time due to be SOB. Sats were no lower then 97% on room air. Pt. Does not qualify for 02 with these results.    02/04/24 1332   Resting (Room Air)   SpO2 100   HR 74   During Walk (Room Air)   SpO2 97   HR 87   Rate of Dyspnea 2   Symptoms Shortness of breath   Comments pt c/o her breath is short until her Hb is back up to 10, she is a dialysis pt and know she gets meds to boost her  Hb every 2 weeks.   After Walk   SpO2 97   HR 88   O2 Device   (room air)   O2 Flow Rate (l/min) 0 l/min   FIO2 (%) 21   Rate of Dyspnea 1   Comments 1-2 min to slow RR   Does the Patient Qualify for Home O2 No   Does the Patient Need Portable Oxygen Tanks No

## 2024-02-04 NOTE — ED TRIAGE NOTES
Patient presents with c/o shortness of breath that has been ongoing x one week but worsened in last 24 hours.  She states having dialysis yesterday and was advised that her hemoglobin was in the  7 range, but could be dropping.

## 2024-02-05 LAB
EKG ATRIAL RATE: 96 BPM
EKG DIAGNOSIS: NORMAL
EKG P AXIS: 65 DEGREES
EKG P-R INTERVAL: 168 MS
EKG Q-T INTERVAL: 380 MS
EKG QRS DURATION: 84 MS
EKG QTC CALCULATION (BAZETT): 480 MS
EKG R AXIS: 46 DEGREES
EKG T AXIS: 66 DEGREES
EKG VENTRICULAR RATE: 96 BPM

## 2024-02-08 ENCOUNTER — CARE COORDINATION (OUTPATIENT)
Facility: CLINIC | Age: 69
End: 2024-02-08

## 2024-02-08 ENCOUNTER — OFFICE VISIT (OUTPATIENT)
Facility: CLINIC | Age: 69
End: 2024-02-08
Payer: MEDICARE

## 2024-02-08 VITALS
BODY MASS INDEX: 28.7 KG/M2 | WEIGHT: 152 LBS | OXYGEN SATURATION: 100 % | SYSTOLIC BLOOD PRESSURE: 170 MMHG | TEMPERATURE: 98.3 F | DIASTOLIC BLOOD PRESSURE: 68 MMHG | HEART RATE: 84 BPM | RESPIRATION RATE: 18 BRPM | HEIGHT: 61 IN

## 2024-02-08 DIAGNOSIS — D63.8 ANEMIA OF CHRONIC DISEASE: Primary | ICD-10-CM

## 2024-02-08 PROCEDURE — 1036F TOBACCO NON-USER: CPT | Performed by: STUDENT IN AN ORGANIZED HEALTH CARE EDUCATION/TRAINING PROGRAM

## 2024-02-08 PROCEDURE — 1123F ACP DISCUSS/DSCN MKR DOCD: CPT | Performed by: STUDENT IN AN ORGANIZED HEALTH CARE EDUCATION/TRAINING PROGRAM

## 2024-02-08 PROCEDURE — 3017F COLORECTAL CA SCREEN DOC REV: CPT | Performed by: STUDENT IN AN ORGANIZED HEALTH CARE EDUCATION/TRAINING PROGRAM

## 2024-02-08 PROCEDURE — G8399 PT W/DXA RESULTS DOCUMENT: HCPCS | Performed by: STUDENT IN AN ORGANIZED HEALTH CARE EDUCATION/TRAINING PROGRAM

## 2024-02-08 PROCEDURE — G8417 CALC BMI ABV UP PARAM F/U: HCPCS | Performed by: STUDENT IN AN ORGANIZED HEALTH CARE EDUCATION/TRAINING PROGRAM

## 2024-02-08 PROCEDURE — 1090F PRES/ABSN URINE INCON ASSESS: CPT | Performed by: STUDENT IN AN ORGANIZED HEALTH CARE EDUCATION/TRAINING PROGRAM

## 2024-02-08 PROCEDURE — G8484 FLU IMMUNIZE NO ADMIN: HCPCS | Performed by: STUDENT IN AN ORGANIZED HEALTH CARE EDUCATION/TRAINING PROGRAM

## 2024-02-08 PROCEDURE — 3077F SYST BP >= 140 MM HG: CPT | Performed by: STUDENT IN AN ORGANIZED HEALTH CARE EDUCATION/TRAINING PROGRAM

## 2024-02-08 PROCEDURE — 99213 OFFICE O/P EST LOW 20 MIN: CPT | Performed by: STUDENT IN AN ORGANIZED HEALTH CARE EDUCATION/TRAINING PROGRAM

## 2024-02-08 PROCEDURE — 3078F DIAST BP <80 MM HG: CPT | Performed by: STUDENT IN AN ORGANIZED HEALTH CARE EDUCATION/TRAINING PROGRAM

## 2024-02-08 PROCEDURE — G8428 CUR MEDS NOT DOCUMENT: HCPCS | Performed by: STUDENT IN AN ORGANIZED HEALTH CARE EDUCATION/TRAINING PROGRAM

## 2024-02-08 RX ORDER — DEXLANSOPRAZOLE 60 MG/1
CAPSULE, DELAYED RELEASE ORAL
COMMUNITY
Start: 2024-02-01

## 2024-02-08 ASSESSMENT — PATIENT HEALTH QUESTIONNAIRE - PHQ9
8. MOVING OR SPEAKING SO SLOWLY THAT OTHER PEOPLE COULD HAVE NOTICED. OR THE OPPOSITE, BEING SO FIGETY OR RESTLESS THAT YOU HAVE BEEN MOVING AROUND A LOT MORE THAN USUAL: 0
SUM OF ALL RESPONSES TO PHQ QUESTIONS 1-9: 0
3. TROUBLE FALLING OR STAYING ASLEEP: 0
2. FEELING DOWN, DEPRESSED OR HOPELESS: 0
SUM OF ALL RESPONSES TO PHQ QUESTIONS 1-9: 0
SUM OF ALL RESPONSES TO PHQ QUESTIONS 1-9: 0
4. FEELING TIRED OR HAVING LITTLE ENERGY: 0
SUM OF ALL RESPONSES TO PHQ9 QUESTIONS 1 & 2: 0
7. TROUBLE CONCENTRATING ON THINGS, SUCH AS READING THE NEWSPAPER OR WATCHING TELEVISION: 0
SUM OF ALL RESPONSES TO PHQ QUESTIONS 1-9: 0
2. FEELING DOWN, DEPRESSED OR HOPELESS: 0
SUM OF ALL RESPONSES TO PHQ QUESTIONS 1-9: 0
1. LITTLE INTEREST OR PLEASURE IN DOING THINGS: 0
SUM OF ALL RESPONSES TO PHQ9 QUESTIONS 1 & 2: 0
SUM OF ALL RESPONSES TO PHQ QUESTIONS 1-9: 0
SUM OF ALL RESPONSES TO PHQ QUESTIONS 1-9: 0
1. LITTLE INTEREST OR PLEASURE IN DOING THINGS: 0
10. IF YOU CHECKED OFF ANY PROBLEMS, HOW DIFFICULT HAVE THESE PROBLEMS MADE IT FOR YOU TO DO YOUR WORK, TAKE CARE OF THINGS AT HOME, OR GET ALONG WITH OTHER PEOPLE: 0
SUM OF ALL RESPONSES TO PHQ QUESTIONS 1-9: 0
6. FEELING BAD ABOUT YOURSELF - OR THAT YOU ARE A FAILURE OR HAVE LET YOURSELF OR YOUR FAMILY DOWN: 0
5. POOR APPETITE OR OVEREATING: 0

## 2024-02-08 NOTE — CARE COORDINATION
Ambulatory Care Coordination Note  2024    Patient Current Location:  Home: [address]  [facility] [address]    ACM contacted the patient by telephone. Verified name and  with patient as identifiers. Provided introduction to self, and explanation of the ACM role.     ACM: Jay Mclaughlin RN    Challenges to be reviewed by the provider   Additional needs identified to be addressed with provider: No  none               Method of communication with provider: phone.    Patient enrolled in Complex Case Management effective 2024 and will be followed per ACM protocol. Initial questions answered with subsequent encounters planned.   Further / follow up appointments listed below - reviewed upcoming appointments  Further questions answered as needed and patient has ACM contact information  Depression screen done - PHQ2 score = 0  Respiratory and cardiac status shows no issues at present  Preliminary review of medications done during this encounter - will do full med rec on next encounter    CHF Protocol:   Daily weights, BP checks, monitor edema in lower legs and SOB.   Limit sodium intake, avoid foods high in sodium.    Monitor fluid intake as per PCP directions.   Notify PCP office for wt gain 3 lbs in 2 days or 5 lbs In 1 week    Offered patient enrollment in the Remote Patient Monitoring (RPM) program for in-home monitoring: Yes, but did not enroll at this time.    Ambulatory Care Coordination Assessment    Care Coordination Protocol  Referral from Primary Care Provider: No  Week 1 - Initial Assessment     Do you have all of your prescriptions and are they filled?: Yes  Barriers to medication adherence: None  Are you able to afford your medications?: Yes  How often do you have trouble taking your medications the way you have been told to take them?: I always take them as prescribed.        Ability to seek help/take action for Emergent Urgent situations i.e. fire, crime, inclement weather or health crisis.: Needs

## 2024-02-08 NOTE — PROGRESS NOTES
Jhoana Membreno presents today for   Chief Complaint   Patient presents with    Follow-up     [facility] ER 2/4 shortness of breath, shingles       Is someone accompanying this pt? no    Is the patient using any DME equipment during OV? no    Health Maintenance reviewed and discussed and ordered per Provider.    Health Maintenance Due   Topic Date Due    DTaP/Tdap/Td vaccine (1 - Tdap) Never done    Respiratory Syncytial Virus (RSV) Pregnant or age 60 yrs+ (1 - 1-dose 60+ series) Never done    Shingles vaccine (2 of 2) 04/04/2023    Flu vaccine (1) 08/01/2023    COVID-19 Vaccine (5 - 2023-24 season) 09/01/2023   .      \"Have you been to the ER, urgent care clinic since your last visit?  Hospitalized since your last visit?\"    YES - When: approximately 4 days ago.  Where and Why: [facility] ER.    “Have you seen or consulted any other health care providers outside of [facility] since your last visit?”    NO

## 2024-02-08 NOTE — PROGRESS NOTES
Subjective:   Jhoana Membreno is a 68 y.o. female who was seen for Follow-up ([facility] ER 2/4 shortness of breath, shingles)    Patient was seen in the ED most recently on 2/4/2024 for shortness of breath.  She has been feeling short of breath for the last week.  Dialysis told her that her hemoglobin was 7.  Reports she was short of breath like this a couple times before when her hemoglobin was down below 9.  She reports they do give her EPO every 2 weeks.  She is not having any chest pain or orthopnea.  She was evaluated in the ED and had negative troponins, EKG, chest x-ray.    Prior to Admission medications    Medication Sig Start Date End Date Taking? Authorizing Provider   dexlansoprazole (DEXILANT) 60 MG CPDR delayed release capsule  2/1/24  Yes Provider, MD Shivani   lidocaine (LIDODERM) 5 % Place 1 patch onto the skin daily for 10 days 12 hours on, 12 hours off. 1/31/24 2/10/24 Yes Julia Crawford APRN - NP   Capsaicin 0.1 % CREA Apply 1 g topically at bedtime 1/21/24  Yes Homa Ochoa MD   fluticasone-umeclidin-vilant (TRELEGY ELLIPTA) 100-62.5-25 MCG/ACT AEPB inhaler INHALE 1 PUFF EVERY DAY 12/28/23  Yes Rashid Vila MD   EPINEPHrine (EPIPEN) 0.3 MG/0.3ML SOAJ injection INJECT 0.3 ML INTO THE MUSCLE ONCE AS NEEDED FOR ALLERGIC RESPONSE 12/13/23  Yes Rashid Vila MD   meclizine (ANTIVERT) 25 MG tablet TAKE 1 TABLET THREE TIMES DAILY AS NEEDED FOR DIZZINESS 11/8/23  Yes Rashid Vila MD   Dextromethorphan-GG-APAP (CORICIDIN HBP COLD/COUGH/FLU) -325 MG/15ML LIQD Take 30 mLs by mouth every 8 hours as needed (cough) 10/26/23  Yes Rashid Vila MD   carvedilol (COREG) 6.25 MG tablet TAKE 1 TABLET BY MOUTH TWICE DAILY ON DAYS OF DIALYSIS 10/25/23  Yes Rashid Vila MD   dicyclomine (BENTYL) 10 MG capsule TAKE 1 CAPSULE EVERY MORNING 9/25/23  Yes Rashid Vila MD   ELIQUIS 2.5 MG TABS tablet TAKE 1 TABLET TWICE DAILY 9/25/23  Yes Rashid Vila MD

## 2024-02-15 ENCOUNTER — CARE COORDINATION (OUTPATIENT)
Facility: CLINIC | Age: 69
End: 2024-02-15

## 2024-02-15 NOTE — CARE COORDINATION
Ambulatory Care Coordination Note  2/15/2024    Patient Current Location:  Home: [address]  [facility] [address]    ACM contacted the patient by telephone. Verified name and  with patient as identifiers. Provided introduction to self, and explanation of the ACM role.     ACM: Jay Mclaughlin RN    Challenges to be reviewed by the provider   Additional needs identified to be addressed with provider: No  none               Method of communication with provider: phone.     Spoke with patient - Patient states doing well at present. Completed day at dialysis with no complications.   Further / follow up appointments listed below - reviewed upcoming appointments  Further questions answered as needed and patient has ACM contact information  Depression screen done - PHQ2 score = 0  Respiratory and cardiac status shows no issues at present  Medication reconciliation done at this encounter with patient. Shows understanding of medication therapy.    CHF Protocol:   Daily weights, BP checks, monitor edema in lower legs and SOB.   Limit sodium intake, avoid foods high in sodium.    Monitor fluid intake as per PCP directions.   Notify PCP office for wt gain 3 lbs in 2 days or 5 lbs In 1 week    HTN Teaching        Always take medication as prescribed . Do not skip or stop medication unless specifically instructed to by MD or PCP. If you forget to take a dose, take it as soon as you remember. However, if it is almost time for your next dose, skip the missed dose and go back to your original schedule. Discussed symptoms of HTN - low sodium diet    Offered patient enrollment in the Remote Patient Monitoring (RPM) program for in-home monitoring: Yes, but did not enroll at this time.    Ambulatory Care Coordination Assessment    Care Coordination Protocol  Referral from Primary Care Provider: No  Week 1 - Initial Assessment     Do you have all of your prescriptions and are they filled?: Yes  Barriers to medication adherence: None  Are

## 2024-02-21 ENCOUNTER — CARE COORDINATION (OUTPATIENT)
Facility: CLINIC | Age: 69
End: 2024-02-21

## 2024-02-21 ASSESSMENT — PATIENT HEALTH QUESTIONNAIRE - PHQ9
SUM OF ALL RESPONSES TO PHQ9 QUESTIONS 1 & 2: 0
SUM OF ALL RESPONSES TO PHQ QUESTIONS 1-9: 0
SUM OF ALL RESPONSES TO PHQ QUESTIONS 1-9: 0
1. LITTLE INTEREST OR PLEASURE IN DOING THINGS: 0
SUM OF ALL RESPONSES TO PHQ QUESTIONS 1-9: 0
SUM OF ALL RESPONSES TO PHQ QUESTIONS 1-9: 0
2. FEELING DOWN, DEPRESSED OR HOPELESS: 0

## 2024-02-21 NOTE — CARE COORDINATION
How would you rate their home environment in terms of safety and stability (including domestic violence, insecure housing, neighbor harassment)?: Consistently safe, supportive, stable, no identified problems   How wells does the patient now understand their health and well-being (symptoms, signs or risk factors) and what they need to do to manage their health?: Reasonable to good understanding and already engages in managing health or is willing to undertake better management   How well do you think your patient can engage in healthcare discussions? (Barriers include language, deafness, aphasia, alcohol or drug problems, learning difficulties, concentration): Clear and open communication, no identified barriers   Suggested Interventions and Community Resources                  Future Appointments   Date Time Provider Department Center   4/17/2024  2:00 PM Rashid Vila MD [facility]     ,   Congestive Heart Failure Assessment    Are you currently restricting fluids?:  (Comment: On Dialysis)  Do you understand a low sodium diet?: Yes  Do you understand how to read food labels?: Yes  How many restaurant meals do you eat per week?: 0, 1-2  Do you salt your food before tasting it?: No     No patient-reported symptoms      Symptoms:  None: Yes      Symptom course: stable  Weight trend: stable  Salt intake watch compared to last visit: stable     ,   COPD Assessment    Does the patient understand envrionmental exposure?: Yes  Is the patient able to verbalize Rescue vs. Long Acting medications?: Yes  Does the patient have a nebulizer?: No  Does the patient use a space with inhaled medications?: No     No patient-reported symptoms         Symptoms:     Have you had a recent diagnosis of pneumonia either by PCP or at a hospital?: No     , and   Hypertension - Encounter Level    Symptom course: stable

## 2024-02-28 ENCOUNTER — CARE COORDINATION (OUTPATIENT)
Facility: CLINIC | Age: 69
End: 2024-02-28

## 2024-03-05 RX ORDER — CARVEDILOL 6.25 MG/1
TABLET ORAL
Qty: 180 TABLET | Refills: 3 | Status: SHIPPED | OUTPATIENT
Start: 2024-03-05

## 2024-03-06 ENCOUNTER — OFFICE VISIT (OUTPATIENT)
Facility: CLINIC | Age: 69
End: 2024-03-06
Payer: MEDICARE

## 2024-03-06 ENCOUNTER — CARE COORDINATION (OUTPATIENT)
Facility: CLINIC | Age: 69
End: 2024-03-06

## 2024-03-06 VITALS
WEIGHT: 153.5 LBS | HEIGHT: 61 IN | HEART RATE: 78 BPM | TEMPERATURE: 97.9 F | DIASTOLIC BLOOD PRESSURE: 60 MMHG | RESPIRATION RATE: 17 BRPM | SYSTOLIC BLOOD PRESSURE: 139 MMHG | BODY MASS INDEX: 28.98 KG/M2 | OXYGEN SATURATION: 100 %

## 2024-03-06 DIAGNOSIS — R30.9 URINARY PAIN: ICD-10-CM

## 2024-03-06 DIAGNOSIS — N30.00 ACUTE CYSTITIS WITHOUT HEMATURIA: Primary | ICD-10-CM

## 2024-03-06 DIAGNOSIS — I10 PRIMARY HYPERTENSION: ICD-10-CM

## 2024-03-06 PROBLEM — I46.9 CARDIAC ARREST (HCC): Status: RESOLVED | Noted: 2023-05-03 | Resolved: 2024-03-06

## 2024-03-06 LAB
BILIRUBIN, URINE, POC: NEGATIVE
BLOOD URINE, POC: ABNORMAL
GLUCOSE URINE, POC: NEGATIVE
LEUKOCYTE ESTERASE, URINE, POC: ABNORMAL
NITRITE, URINE, POC: NEGATIVE
SPECIFIC GRAVITY, URINE, POC: 1.02 (ref 1–1.03)
URINALYSIS CLARITY, POC: CLEAR
UROBILINOGEN, POC: ABNORMAL

## 2024-03-06 PROCEDURE — G8399 PT W/DXA RESULTS DOCUMENT: HCPCS | Performed by: STUDENT IN AN ORGANIZED HEALTH CARE EDUCATION/TRAINING PROGRAM

## 2024-03-06 PROCEDURE — 99214 OFFICE O/P EST MOD 30 MIN: CPT | Performed by: STUDENT IN AN ORGANIZED HEALTH CARE EDUCATION/TRAINING PROGRAM

## 2024-03-06 PROCEDURE — 1090F PRES/ABSN URINE INCON ASSESS: CPT | Performed by: STUDENT IN AN ORGANIZED HEALTH CARE EDUCATION/TRAINING PROGRAM

## 2024-03-06 PROCEDURE — 3017F COLORECTAL CA SCREEN DOC REV: CPT | Performed by: STUDENT IN AN ORGANIZED HEALTH CARE EDUCATION/TRAINING PROGRAM

## 2024-03-06 PROCEDURE — G8428 CUR MEDS NOT DOCUMENT: HCPCS | Performed by: STUDENT IN AN ORGANIZED HEALTH CARE EDUCATION/TRAINING PROGRAM

## 2024-03-06 PROCEDURE — G8484 FLU IMMUNIZE NO ADMIN: HCPCS | Performed by: STUDENT IN AN ORGANIZED HEALTH CARE EDUCATION/TRAINING PROGRAM

## 2024-03-06 PROCEDURE — 3075F SYST BP GE 130 - 139MM HG: CPT | Performed by: STUDENT IN AN ORGANIZED HEALTH CARE EDUCATION/TRAINING PROGRAM

## 2024-03-06 PROCEDURE — 81003 URINALYSIS AUTO W/O SCOPE: CPT | Performed by: STUDENT IN AN ORGANIZED HEALTH CARE EDUCATION/TRAINING PROGRAM

## 2024-03-06 PROCEDURE — 3078F DIAST BP <80 MM HG: CPT | Performed by: STUDENT IN AN ORGANIZED HEALTH CARE EDUCATION/TRAINING PROGRAM

## 2024-03-06 PROCEDURE — G8417 CALC BMI ABV UP PARAM F/U: HCPCS | Performed by: STUDENT IN AN ORGANIZED HEALTH CARE EDUCATION/TRAINING PROGRAM

## 2024-03-06 PROCEDURE — 1123F ACP DISCUSS/DSCN MKR DOCD: CPT | Performed by: STUDENT IN AN ORGANIZED HEALTH CARE EDUCATION/TRAINING PROGRAM

## 2024-03-06 PROCEDURE — 1036F TOBACCO NON-USER: CPT | Performed by: STUDENT IN AN ORGANIZED HEALTH CARE EDUCATION/TRAINING PROGRAM

## 2024-03-06 RX ORDER — CIPROFLOXACIN 250 MG/1
250 TABLET, FILM COATED ORAL 2 TIMES DAILY
Qty: 6 TABLET | Refills: 0 | Status: SHIPPED | OUTPATIENT
Start: 2024-03-06 | End: 2024-03-08 | Stop reason: SDUPTHER

## 2024-03-06 RX ORDER — NIFEDIPINE 90 MG/1
90 TABLET, EXTENDED RELEASE ORAL DAILY
Qty: 90 TABLET | Refills: 1 | Status: SHIPPED | OUTPATIENT
Start: 2024-03-06

## 2024-03-06 NOTE — PROGRESS NOTES
Jhoana Membreno presents today for   Chief Complaint   Patient presents with    Urinary Tract Infection     Patient reports having stomach pain, \"feeling funny\" when she urinates, and having a fever of 101 since sunday       Is someone accompanying this pt? no    Is the patient using any DME equipment during OV? no    Health Maintenance reviewed and discussed and ordered per Provider.    Health Maintenance Due   Topic Date Due    DTaP/Tdap/Td vaccine (1 - Tdap) Never done    Respiratory Syncytial Virus (RSV) Pregnant or age 60 yrs+ (1 - 1-dose 60+ series) Never done    Shingles vaccine (2 of 2) 04/04/2023    Flu vaccine (1) 08/01/2023    COVID-19 Vaccine (5 - 2023-24 season) 09/01/2023   .      \"Have you been to the ER, urgent care clinic since your last visit?  Hospitalized since your last visit?\"    NO    “Have you seen or consulted any other health care providers outside of [facility] since your last visit?”    NO

## 2024-03-06 NOTE — PROGRESS NOTES
Jhoana Membreno (: 1955) is a 68 y.o. female is here for evaluation of the following chief complaint(s): Urinary Tract Infection (Patient reports having stomach pain, \"feeling funny\" when she urinates, and having a fever of 101 since )    Assessment/Plan:   1. Acute cystitis without hematuria  -     ciprofloxacin (CIPRO) 250 MG tablet; Take 1 tablet by mouth 2 times daily for 3 days, Disp-6 tablet, R-0Normal  2. Urinary pain  -     AMB POC URINALYSIS DIP STICK AUTO W/O MICRO  -     Culture, Urine; Future  3. Primary hypertension  Comments:  controlled  Orders:  -     NIFEdipine (PROCARDIA XL) 90 MG extended release tablet; Take 1 tablet by mouth daily, Disp-90 tablet, R-1Normal    The above diagnosis is a new problem.  We discussed expected course, resolution, and complications of diagnosis in detail.  I advised her to call back or return to office if symptoms worsen/change/persist.          Subjective/Objective:   She complains of dysuria, frequency, urgency for 2 days.  She denies nausea, vomiting.  Since starting she reports her symptoms are worsened. Reports fever to 101 F..      /60 (Site: Right Upper Arm, Position: Sitting, Cuff Size: Large Adult)   Pulse 78   Temp 97.9 °F (36.6 °C) (Temporal)   Resp 17   Ht 1.549 m (5' 1\")   Wt 69.6 kg (153 lb 8 oz)   SpO2 100%   BMI 29.00 kg/m²        An electronic signature was used to authenticate this note.  -- Rashid Vila MD

## 2024-03-07 ENCOUNTER — TELEPHONE (OUTPATIENT)
Facility: CLINIC | Age: 69
End: 2024-03-07

## 2024-03-07 DIAGNOSIS — N30.00 ACUTE CYSTITIS WITHOUT HEMATURIA: ICD-10-CM

## 2024-03-07 NOTE — TELEPHONE ENCOUNTER
Patient was seen yesterday 3/6/24 for a UTI she stated that she doesn't believe the medication that was given to her is strong enough and she doesn't want to go in to the weekend like this. She didn't know if the provider would be able to up the does of medication. She stated that she will be out of it tomorrow.     Call back number is [phone]

## 2024-03-08 RX ORDER — CIPROFLOXACIN 250 MG/1
250 TABLET, FILM COATED ORAL 2 TIMES DAILY
Qty: 10 TABLET | Refills: 0 | Status: SHIPPED | OUTPATIENT
Start: 2024-03-08 | End: 2024-03-13

## 2024-03-08 NOTE — TELEPHONE ENCOUNTER
Patient calling in regards to message sent on 3/7/24. She would like to have  call her back when she has a chance She does not want to end up in the ER with a UTI.    Call back number is [phone]

## 2024-03-10 LAB
BACTERIA UR CULT: ABNORMAL
BACTERIA UR CULT: ABNORMAL

## 2024-03-12 LAB
BACTERIA UR CULT: ABNORMAL
BACTERIA UR CULT: ABNORMAL

## 2024-03-13 ENCOUNTER — TELEPHONE (OUTPATIENT)
Facility: CLINIC | Age: 69
End: 2024-03-13

## 2024-03-13 RX ORDER — AMOXICILLIN AND CLAVULANATE POTASSIUM 500; 125 MG/1; MG/1
1 TABLET, FILM COATED ORAL DAILY
Qty: 7 TABLET | Refills: 0 | Status: SHIPPED | OUTPATIENT
Start: 2024-03-13 | End: 2024-03-20

## 2024-03-13 NOTE — TELEPHONE ENCOUNTER
Spoke with patient on the phone today   Saw Dr. Vila last week for UTI- Dr. Vila out of office currently, I reviewed culture  Grew MDR enterococcus  Resistant to Cipro which is what she has been taking   Having ongoing symptoms    Limited in antibiotic choice because patient is ESRD, lots of allergies   Will treat with Augmentin- patient confirms she is not allergic to penicillins.   Return precuations--- if this does not work, would likely need IV antibiotics in the hospital based on culture results.     Sandi Negron MD

## 2024-03-18 ENCOUNTER — HOSPITAL ENCOUNTER (EMERGENCY)
Age: 69
Discharge: HOME | End: 2024-03-18
Attending: EMERGENCY MEDICINE
Payer: MEDICARE

## 2024-03-18 ENCOUNTER — APPOINTMENT (OUTPATIENT)
Age: 69
End: 2024-03-18
Payer: MEDICARE

## 2024-03-18 VITALS
SYSTOLIC BLOOD PRESSURE: 172 MMHG | OXYGEN SATURATION: 100 % | DIASTOLIC BLOOD PRESSURE: 70 MMHG | HEIGHT: 61 IN | TEMPERATURE: 97.5 F | WEIGHT: 154 LBS | BODY MASS INDEX: 29.07 KG/M2 | HEART RATE: 73 BPM | RESPIRATION RATE: 16 BRPM

## 2024-03-18 DIAGNOSIS — N18.6 END-STAGE RENAL DISEASE ON HEMODIALYSIS (HCC): ICD-10-CM

## 2024-03-18 DIAGNOSIS — Z99.2 END-STAGE RENAL DISEASE ON HEMODIALYSIS (HCC): ICD-10-CM

## 2024-03-18 DIAGNOSIS — M54.50 RIGHT LUMBAR PAIN: Primary | ICD-10-CM

## 2024-03-18 LAB
ALBUMIN SERPL-MCNC: 4.3 G/DL (ref 3.4–5)
ALBUMIN/GLOB SERPL: 1.2 (ref 0.8–1.7)
ALP SERPL-CCNC: 119 U/L (ref 45–117)
ALT SERPL-CCNC: 20 U/L (ref 13–56)
ANION GAP SERPL CALC-SCNC: 9 MMOL/L (ref 3–18)
AST SERPL W P-5'-P-CCNC: 18 U/L (ref 10–38)
BASOPHILS # BLD: 0.1 K/UL (ref 0–0.1)
BASOPHILS NFR BLD: 1 % (ref 0–2)
BILIRUB SERPL-MCNC: 0.5 MG/DL (ref 0.2–1)
BUN SERPL-MCNC: 40 MG/DL (ref 7–18)
BUN/CREAT SERPL: 5 (ref 12–20)
CA-I BLD-MCNC: 8.8 MG/DL (ref 8.5–10.1)
CHLORIDE SERPL-SCNC: 107 MMOL/L (ref 100–111)
CO2 SERPL-SCNC: 27 MMOL/L (ref 21–32)
CREAT SERPL-MCNC: 7.29 MG/DL (ref 0.6–1.3)
DIFFERENTIAL METHOD BLD: ABNORMAL
EOSINOPHIL # BLD: 0.3 K/UL (ref 0–0.4)
EOSINOPHIL NFR BLD: 4 % (ref 0–5)
ERYTHROCYTE [DISTWIDTH] IN BLOOD BY AUTOMATED COUNT: 14.2 % (ref 11.6–14.5)
GLOBULIN SER CALC-MCNC: 3.6 G/DL (ref 2–4)
GLUCOSE SERPL-MCNC: 83 MG/DL (ref 74–99)
HCT VFR BLD AUTO: 31.1 % (ref 35–45)
HGB BLD-MCNC: 9.8 G/DL (ref 12–16)
IMM GRANULOCYTES # BLD AUTO: 0 K/UL (ref 0–0.04)
IMM GRANULOCYTES NFR BLD AUTO: 0 % (ref 0–0.5)
LIPASE SERPL-CCNC: 67 U/L (ref 13–75)
LYMPHOCYTES # BLD: 2 K/UL (ref 0.9–3.6)
LYMPHOCYTES NFR BLD: 25 % (ref 21–52)
MCH RBC QN AUTO: 32 PG (ref 24–34)
MCHC RBC AUTO-ENTMCNC: 31.5 G/DL (ref 31–37)
MCV RBC AUTO: 101.6 FL (ref 78–100)
MONOCYTES # BLD: 0.9 K/UL (ref 0.05–1.2)
MONOCYTES NFR BLD: 11 % (ref 3–10)
NEUTS SEG # BLD: 4.5 K/UL (ref 1.8–8)
NEUTS SEG NFR BLD: 59 % (ref 40–73)
NRBC # BLD: 0 K/UL (ref 0–0.01)
NRBC BLD-RTO: 0 PER 100 WBC
PLATELET # BLD AUTO: 165 K/UL (ref 135–420)
PMV BLD AUTO: 9.5 FL (ref 9.2–11.8)
POTASSIUM SERPL-SCNC: 3.8 MMOL/L (ref 3.5–5.5)
PROT SERPL-MCNC: 7.9 G/DL (ref 6.4–8.2)
RBC # BLD AUTO: 3.06 M/UL (ref 4.2–5.3)
SODIUM SERPL-SCNC: 143 MMOL/L (ref 136–145)
WBC # BLD AUTO: 7.7 K/UL (ref 4.6–13.2)

## 2024-03-18 PROCEDURE — 6360000002 HC RX W HCPCS: Performed by: EMERGENCY MEDICINE

## 2024-03-18 PROCEDURE — 74176 CT ABD & PELVIS W/O CONTRAST: CPT

## 2024-03-18 PROCEDURE — 99284 EMERGENCY DEPT VISIT MOD MDM: CPT

## 2024-03-18 PROCEDURE — 6370000000 HC RX 637 (ALT 250 FOR IP): Performed by: EMERGENCY MEDICINE

## 2024-03-18 PROCEDURE — 83690 ASSAY OF LIPASE: CPT

## 2024-03-18 PROCEDURE — 80053 COMPREHEN METABOLIC PANEL: CPT

## 2024-03-18 PROCEDURE — 96374 THER/PROPH/DIAG INJ IV PUSH: CPT

## 2024-03-18 PROCEDURE — 85025 COMPLETE CBC W/AUTO DIFF WBC: CPT

## 2024-03-18 RX ORDER — HYDROCODONE BITARTRATE AND ACETAMINOPHEN 5; 325 MG/1; MG/1
1 TABLET ORAL EVERY 6 HOURS PRN
Qty: 5 TABLET | Refills: 0 | Status: SHIPPED | OUTPATIENT
Start: 2024-03-18 | End: 2024-03-20

## 2024-03-18 RX ORDER — ACETAMINOPHEN 500 MG
1000 TABLET ORAL
Status: DISCONTINUED | OUTPATIENT
Start: 2024-03-18 | End: 2024-03-18

## 2024-03-18 RX ORDER — HYDROCODONE BITARTRATE AND ACETAMINOPHEN 5; 325 MG/1; MG/1
1 TABLET ORAL
Status: COMPLETED | OUTPATIENT
Start: 2024-03-18 | End: 2024-03-18

## 2024-03-18 RX ORDER — ONDANSETRON 2 MG/ML
4 INJECTION INTRAMUSCULAR; INTRAVENOUS ONCE
Status: COMPLETED | OUTPATIENT
Start: 2024-03-18 | End: 2024-03-18

## 2024-03-18 RX ADMIN — ONDANSETRON 4 MG: 2 INJECTION INTRAMUSCULAR; INTRAVENOUS at 10:26

## 2024-03-18 RX ADMIN — HYDROCODONE BITARTRATE AND ACETAMINOPHEN 1 TABLET: 5; 325 TABLET ORAL at 10:27

## 2024-03-18 NOTE — ED TRIAGE NOTES
Reports ride sided flank pain since about 0200 today. Last dose of tylenol was 0400. Reports normal bowel movement this AM. She is being treated for UTI, has 2 days left on antibiotics.

## 2024-03-18 NOTE — DISCHARGE INSTRUCTIONS
You had blood test and a CAT scan which were reassuring today.  Your back pain is related to muscle strain.  This is not related to your kidneys or UTI in any way.  I did review your urine culture for which you should finish your penicillin and follow-up with your primary care provider.

## 2024-03-18 NOTE — ED PROVIDER NOTES
[facility] EMERGENCY DEPT  EMERGENCY DEPARTMENT ENCOUNTER      Pt Name: Jhoana Membreno  MRN: [id number]  Birthdate 1955  Date of evaluation: 3/18/2024  Provider: Jovani Antonio MD  3:42 PM    CHIEF COMPLAINT       Chief Complaint   Patient presents with    Flank Pain     Right sided         HISTORY OF PRESENT ILLNESS    Jhoana Membreno is a 68 y.o. female with past medical history significant for Tuesday Thursday hemodialysis, hypertension, high cholesterol who presents to the emergency department for evaluation of right lower back and side pain for the last few hours.  Patient is being treated for UTI.    The history is provided by the patient, the EMS personnel and medical records.       Nursing Notes were reviewed.    REVIEW OF SYSTEMS       Review of Systems   All other systems reviewed and are negative.      Except as noted above the remainder of the review of systems was reviewed and negative.       PAST MEDICAL HISTORY     Past Medical History:   Diagnosis Date    JULIET (acute kidney injury) (Formerly [facility]) 05/09/2019    Anxiety     Arrhythmia     Arthritis     Asthma     Asthma     Burning with urination     frequent uti    Calculus of gallbladder with acute cholecystitis without obstruction 10/09/2020    Cholecystitis 01/12/2019    Chronic kidney disease     dialysis    CMV (cytomegalovirus) antibody positive     Colitis 09/10/2022    COPD (chronic obstructive pulmonary disease) (Formerly [facility])     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    Dialysis patient (Formerly [facility])     M/W/F    Diverticular disease of colon 08/21/2013    Dyspepsia and other specified disorders of function of stomach     stomach ulcer    Elevated troponin 10/21/2019    Encounter for cholecystectomy 05/06/2021 7/2020    Essential hypertension     GERD (gastroesophageal reflux disease)     History of chemotherapy     History of renal transplant 08/21/2013    (LD 2/12/2002)    HLD (hyperlipidemia)     Hypercholesteremia     Hypertension     Hypoglycemia 02/14/2023

## 2024-03-22 ENCOUNTER — CARE COORDINATION (OUTPATIENT)
Facility: CLINIC | Age: 69
End: 2024-03-22

## 2024-03-22 ASSESSMENT — PATIENT HEALTH QUESTIONNAIRE - PHQ9
SUM OF ALL RESPONSES TO PHQ QUESTIONS 1-9: 0
SUM OF ALL RESPONSES TO PHQ QUESTIONS 1-9: 0
SUM OF ALL RESPONSES TO PHQ9 QUESTIONS 1 & 2: 0
SUM OF ALL RESPONSES TO PHQ QUESTIONS 1-9: 0
2. FEELING DOWN, DEPRESSED OR HOPELESS: NOT AT ALL
1. LITTLE INTEREST OR PLEASURE IN DOING THINGS: NOT AT ALL
SUM OF ALL RESPONSES TO PHQ QUESTIONS 1-9: 0

## 2024-03-22 NOTE — CARE COORDINATION
stable, no identified problems   How wells does the patient now understand their health and well-being (symptoms, signs or risk factors) and what they need to do to manage their health?: Reasonable to good understanding and already engages in managing health or is willing to undertake better management   How well do you think your patient can engage in healthcare discussions? (Barriers include language, deafness, aphasia, alcohol or drug problems, learning difficulties, concentration): Clear and open communication, no identified barriers   Suggested Interventions and Community Resources                  Future Appointments   Date Time Provider Department Center   4/17/2024  2:00 PM Rashid Vila MD [facility]     ,   Congestive Heart Failure Assessment    Are you currently restricting fluids?:  (Comment: On Dialysis)  Do you understand a low sodium diet?: Yes  Do you understand how to read food labels?: Yes  How many restaurant meals do you eat per week?: 0, 1-2  Do you salt your food before tasting it?: No     No patient-reported symptoms      Symptoms:  None: Yes      Symptom course: stable  Weight trend: stable  Salt intake watch compared to last visit: stable     ,   COPD Assessment    Does the patient understand envrionmental exposure?: Yes  Is the patient able to verbalize Rescue vs. Long Acting medications?: Yes  Does the patient have a nebulizer?: No  Does the patient use a space with inhaled medications?: No     No patient-reported symptoms         Symptoms:     Have you had a recent diagnosis of pneumonia either by PCP or at a hospital?: No     , and   Hypertension - Encounter Level    Symptom course: stable

## 2024-03-26 ENCOUNTER — OFFICE VISIT (OUTPATIENT)
Facility: CLINIC | Age: 69
End: 2024-03-26
Payer: MEDICARE

## 2024-03-26 VITALS
SYSTOLIC BLOOD PRESSURE: 165 MMHG | HEIGHT: 61 IN | WEIGHT: 151 LBS | OXYGEN SATURATION: 99 % | HEART RATE: 89 BPM | RESPIRATION RATE: 18 BRPM | DIASTOLIC BLOOD PRESSURE: 74 MMHG | TEMPERATURE: 97.7 F | BODY MASS INDEX: 28.51 KG/M2

## 2024-03-26 DIAGNOSIS — R30.9 URINARY PAIN: Primary | ICD-10-CM

## 2024-03-26 LAB
BILIRUBIN, URINE, POC: NEGATIVE
BLOOD URINE, POC: NORMAL
GLUCOSE URINE, POC: NEGATIVE
KETONES, URINE, POC: NEGATIVE
LEUKOCYTE ESTERASE, URINE, POC: NORMAL
NITRITE, URINE, POC: NEGATIVE
PH, URINE, POC: 7.5 (ref 4.6–8)
PROTEIN,URINE, POC: NORMAL
SPECIFIC GRAVITY, URINE, POC: 1.02 (ref 1–1.03)
URINALYSIS CLARITY, POC: CLEAR
URINALYSIS COLOR, POC: YELLOW
UROBILINOGEN, POC: NORMAL

## 2024-03-26 PROCEDURE — G8399 PT W/DXA RESULTS DOCUMENT: HCPCS | Performed by: STUDENT IN AN ORGANIZED HEALTH CARE EDUCATION/TRAINING PROGRAM

## 2024-03-26 PROCEDURE — 81003 URINALYSIS AUTO W/O SCOPE: CPT | Performed by: STUDENT IN AN ORGANIZED HEALTH CARE EDUCATION/TRAINING PROGRAM

## 2024-03-26 PROCEDURE — 1090F PRES/ABSN URINE INCON ASSESS: CPT | Performed by: STUDENT IN AN ORGANIZED HEALTH CARE EDUCATION/TRAINING PROGRAM

## 2024-03-26 PROCEDURE — G8428 CUR MEDS NOT DOCUMENT: HCPCS | Performed by: STUDENT IN AN ORGANIZED HEALTH CARE EDUCATION/TRAINING PROGRAM

## 2024-03-26 PROCEDURE — G8417 CALC BMI ABV UP PARAM F/U: HCPCS | Performed by: STUDENT IN AN ORGANIZED HEALTH CARE EDUCATION/TRAINING PROGRAM

## 2024-03-26 PROCEDURE — G8484 FLU IMMUNIZE NO ADMIN: HCPCS | Performed by: STUDENT IN AN ORGANIZED HEALTH CARE EDUCATION/TRAINING PROGRAM

## 2024-03-26 PROCEDURE — 1036F TOBACCO NON-USER: CPT | Performed by: STUDENT IN AN ORGANIZED HEALTH CARE EDUCATION/TRAINING PROGRAM

## 2024-03-26 PROCEDURE — 3017F COLORECTAL CA SCREEN DOC REV: CPT | Performed by: STUDENT IN AN ORGANIZED HEALTH CARE EDUCATION/TRAINING PROGRAM

## 2024-03-26 PROCEDURE — 3078F DIAST BP <80 MM HG: CPT | Performed by: STUDENT IN AN ORGANIZED HEALTH CARE EDUCATION/TRAINING PROGRAM

## 2024-03-26 PROCEDURE — 3077F SYST BP >= 140 MM HG: CPT | Performed by: STUDENT IN AN ORGANIZED HEALTH CARE EDUCATION/TRAINING PROGRAM

## 2024-03-26 PROCEDURE — 99213 OFFICE O/P EST LOW 20 MIN: CPT | Performed by: STUDENT IN AN ORGANIZED HEALTH CARE EDUCATION/TRAINING PROGRAM

## 2024-03-26 PROCEDURE — 1123F ACP DISCUSS/DSCN MKR DOCD: CPT | Performed by: STUDENT IN AN ORGANIZED HEALTH CARE EDUCATION/TRAINING PROGRAM

## 2024-03-26 RX ORDER — AMOXICILLIN AND CLAVULANATE POTASSIUM 500; 125 MG/1; MG/1
1 TABLET, FILM COATED ORAL 2 TIMES DAILY
Qty: 14 TABLET | Refills: 0 | Status: SHIPPED | OUTPATIENT
Start: 2024-03-26 | End: 2024-04-02

## 2024-03-26 RX ORDER — MONTELUKAST SODIUM 10 MG/1
TABLET ORAL
COMMUNITY
Start: 2024-03-05

## 2024-03-26 RX ORDER — LOSARTAN POTASSIUM 50 MG/1
TABLET ORAL
COMMUNITY
Start: 2024-03-05

## 2024-03-26 NOTE — PROGRESS NOTES
Jhoana Membreno presents today for   Chief Complaint   Patient presents with    Frequent/Recurrent UTI     Patient believes her UTI has come back. Reports having urinary pain and burning       Is someone accompanying this pt? no    Is the patient using any DME equipment during OV? no    Health Maintenance reviewed and discussed and ordered per Provider.    Health Maintenance Due   Topic Date Due    DTaP/Tdap/Td vaccine (1 - Tdap) Never done    Respiratory Syncytial Virus (RSV) Pregnant or age 60 yrs+ (1 - 1-dose 60+ series) Never done    Shingles vaccine (2 of 2) 04/04/2023    Flu vaccine (1) 08/01/2023    COVID-19 Vaccine (5 - 2023-24 season) 09/01/2023   .      \"Have you been to the ER, urgent care clinic since your last visit?  Hospitalized since your last visit?\"    NO    “Have you seen or consulted any other health care providers outside of [facility] since your last visit?”    NO

## 2024-03-26 NOTE — PROGRESS NOTES
SICK VISIT     Jhoana Membreno (: 1955) is a 68 y.o. female here for evaluation of the following chief concerns(s):  Frequent/Recurrent UTI (Patient believes her UTI has come back. Reports having urinary pain and burning)       ASSESSMENT/PLAN:  1. Urinary pain  -     AMB POC URINALYSIS DIP STICK AUTO W/O MICRO  -     Culture, Urine; Future    Orders Placed This Encounter   Procedures    Culture, Urine     Standing Status:   Future     Standing Expiration Date:   3/26/2025    AMB POC URINALYSIS DIP STICK AUTO W/O MICRO     Treat with another course of Augmentin  Will follow culture   Consider urology vs infectious disease referral given so much resistance, frequent UTI's    FU with PCP. ER precautions.     FU as scheduled for chronic disease    Patient agrees with plan as above and has no additional questions at this time.     SUBJECTIVE/OBJECTIVE:    Patient presents for urinary symptoms  Started yesterday.    + dysuria, urinary frequency, urinary urgency   No fevers/chills, nausea/vomiting, new back pain. She was seen in ER last week for back pain- CT did not show anything acute- likely mucle strain.     She was just treated for UTI 3/6- grew enterococcus faecalis with lots of resistance- only sensitive to linezolid, macrobid, penicillin, vancomycin. Macrobid contraindicated because patient is ESRD     She was treated with Penicillin. Symptoms resolved- but have now seemed to come back    She has apparently seen an infectious disease specialist in the past about her frequent UTI's? She can't remember who- I can't see any records.       Vitals:    24 1253 24 1256   BP: (!) 165/74 (!) 165/74   Pulse: 89    Resp: 18    Temp: 97.7 °F (36.5 °C)    TempSrc: Temporal    SpO2: 99%    Weight: 68.5 kg (151 lb)    Height: 1.549 m (5' 1\")       Body mass index is 28.53 kg/m².     Gen: NAD, well appearing   Abd: Soft, non tender to palpation. No CVA tenderness  Psych: cooperative. Appropriate mood and

## 2024-03-30 LAB
BACTERIA UR CULT: ABNORMAL
SPECIMEN STATUS REPORT: NORMAL

## 2024-04-01 RX ORDER — DOXYCYCLINE HYCLATE 100 MG
100 TABLET ORAL 2 TIMES DAILY
Qty: 14 TABLET | Refills: 0 | Status: SHIPPED | OUTPATIENT
Start: 2024-04-01 | End: 2024-04-08

## 2024-04-03 ENCOUNTER — CARE COORDINATION (OUTPATIENT)
Facility: CLINIC | Age: 69
End: 2024-04-03

## 2024-04-03 NOTE — CARE COORDINATION
Ambulatory Care Coordination Note  4/3/2024    Patient Current Location:  Home: [address]  [facility] [address]    ACM contacted the patient by telephone. Verified name and  with patient as identifiers. Provided introduction to self, and explanation of the ACM role.     ACM: Jay Mclaughlin RN    Challenges to be reviewed by the provider   Additional needs identified to be addressed with provider: No  none               Method of communication with provider: phone.     Spoke with patient - Chief Complaint of UTI that is requiring different meds to cure. NO bleeding or pain reported on urination at this time.Advised Patient to complete all antibiotic meds.  Further / follow up appointments listed below - reviewed upcoming appointments  Further questions answered as needed and patient has ACM contact information  Dialysis of yesterday completed without complications.     CHF Protocol:   Daily weights, BP checks, monitor edema in lower legs and SOB.   Limit sodium intake, avoid foods high in sodium.    Monitor fluid intake as per PCP directions.   Notify PCP office for wt gain 3 lbs in 2 days or 5 lbs In 1 week    HTN Teaching        Always take medication as prescribed . Do not skip or stop medication unless specifically instructed to by MD or PCP. If you forget to take a dose, take it as soon as you remember. However, if it is almost time for your next dose, skip the missed dose and go back to your original schedule. Discussed symptoms of HTN - low sodium diet    Offered patient enrollment in the Remote Patient Monitoring (RPM) program for in-home monitoring: Patient declined - does not want at this time.    Ambulatory Care Coordination Assessment    Care Coordination Protocol  Referral from Primary Care Provider: No  Week 1 - Initial Assessment     Do you have all of your prescriptions and are they filled?: Yes  Barriers to medication adherence: None  Are you able to afford your medications?: Yes  How often do you

## 2024-04-08 ENCOUNTER — TELEPHONE (OUTPATIENT)
Facility: CLINIC | Age: 69
End: 2024-04-08

## 2024-04-08 RX ORDER — CEPHALEXIN 500 MG/1
500 CAPSULE ORAL 2 TIMES DAILY
Qty: 10 CAPSULE | Refills: 0 | Status: SHIPPED | OUTPATIENT
Start: 2024-04-08 | End: 2024-04-13

## 2024-04-08 NOTE — PROGRESS NOTES
Patient recently treated for UTI with Augmentin.  Urine culture shows that E. coli is resistant to this.  Will send in Keflex.  She has multiple drug allergies but some of them are not true allergies.  She gets minor itching with Keflex and can take Benadryl and feel fine while taking it.  Will send this to the pharmacy.  Rashid Vila MD

## 2024-04-08 NOTE — TELEPHONE ENCOUNTER
Patient called stating that the medication she was given for a UTI has not cleared it. She was not sure if she needed to come by and give another sample or some other medication could be called in for her.     Call back number is [phone]

## 2024-04-17 ENCOUNTER — CARE COORDINATION (OUTPATIENT)
Facility: CLINIC | Age: 69
End: 2024-04-17

## 2024-04-17 ENCOUNTER — OFFICE VISIT (OUTPATIENT)
Facility: CLINIC | Age: 69
End: 2024-04-17
Payer: MEDICARE

## 2024-04-17 VITALS — OXYGEN SATURATION: 99 % | SYSTOLIC BLOOD PRESSURE: 160 MMHG | DIASTOLIC BLOOD PRESSURE: 69 MMHG | HEART RATE: 64 BPM

## 2024-04-17 DIAGNOSIS — I10 UNCONTROLLED HYPERTENSION: ICD-10-CM

## 2024-04-17 DIAGNOSIS — N30.00 ACUTE CYSTITIS WITHOUT HEMATURIA: Primary | ICD-10-CM

## 2024-04-17 DIAGNOSIS — K21.9 GASTROESOPHAGEAL REFLUX DISEASE WITHOUT ESOPHAGITIS: ICD-10-CM

## 2024-04-17 PROCEDURE — 1090F PRES/ABSN URINE INCON ASSESS: CPT | Performed by: STUDENT IN AN ORGANIZED HEALTH CARE EDUCATION/TRAINING PROGRAM

## 2024-04-17 PROCEDURE — 3077F SYST BP >= 140 MM HG: CPT | Performed by: STUDENT IN AN ORGANIZED HEALTH CARE EDUCATION/TRAINING PROGRAM

## 2024-04-17 PROCEDURE — G8399 PT W/DXA RESULTS DOCUMENT: HCPCS | Performed by: STUDENT IN AN ORGANIZED HEALTH CARE EDUCATION/TRAINING PROGRAM

## 2024-04-17 PROCEDURE — 3017F COLORECTAL CA SCREEN DOC REV: CPT | Performed by: STUDENT IN AN ORGANIZED HEALTH CARE EDUCATION/TRAINING PROGRAM

## 2024-04-17 PROCEDURE — 1123F ACP DISCUSS/DSCN MKR DOCD: CPT | Performed by: STUDENT IN AN ORGANIZED HEALTH CARE EDUCATION/TRAINING PROGRAM

## 2024-04-17 PROCEDURE — 3078F DIAST BP <80 MM HG: CPT | Performed by: STUDENT IN AN ORGANIZED HEALTH CARE EDUCATION/TRAINING PROGRAM

## 2024-04-17 PROCEDURE — G8417 CALC BMI ABV UP PARAM F/U: HCPCS | Performed by: STUDENT IN AN ORGANIZED HEALTH CARE EDUCATION/TRAINING PROGRAM

## 2024-04-17 PROCEDURE — 99214 OFFICE O/P EST MOD 30 MIN: CPT | Performed by: STUDENT IN AN ORGANIZED HEALTH CARE EDUCATION/TRAINING PROGRAM

## 2024-04-17 PROCEDURE — G8428 CUR MEDS NOT DOCUMENT: HCPCS | Performed by: STUDENT IN AN ORGANIZED HEALTH CARE EDUCATION/TRAINING PROGRAM

## 2024-04-17 PROCEDURE — 81003 URINALYSIS AUTO W/O SCOPE: CPT | Performed by: STUDENT IN AN ORGANIZED HEALTH CARE EDUCATION/TRAINING PROGRAM

## 2024-04-17 PROCEDURE — 1036F TOBACCO NON-USER: CPT | Performed by: STUDENT IN AN ORGANIZED HEALTH CARE EDUCATION/TRAINING PROGRAM

## 2024-04-17 RX ORDER — CEPHALEXIN 500 MG/1
500 CAPSULE ORAL 2 TIMES DAILY
Qty: 10 CAPSULE | Refills: 0 | Status: SHIPPED | OUTPATIENT
Start: 2024-04-17 | End: 2024-04-22

## 2024-04-17 RX ORDER — PREDNISONE 5 MG/1
5 TABLET ORAL DAILY
Qty: 90 TABLET | Refills: 3 | Status: SHIPPED | OUTPATIENT
Start: 2024-04-17

## 2024-04-17 RX ORDER — HYDRALAZINE HYDROCHLORIDE 25 MG/1
25 TABLET, FILM COATED ORAL 3 TIMES DAILY
Qty: 270 TABLET | Refills: 3 | Status: SHIPPED | OUTPATIENT
Start: 2024-04-17

## 2024-04-17 RX ORDER — DEXLANSOPRAZOLE 60 MG/1
CAPSULE, DELAYED RELEASE ORAL
Qty: 90 CAPSULE | Refills: 3 | Status: SHIPPED | OUTPATIENT
Start: 2024-04-17

## 2024-04-17 NOTE — PROGRESS NOTES
Subjective:   Jhoana Membreno is a 68 y.o. female who was seen for Follow-up    Patient reports that she is still having UTI symptoms but it is improving after taking the Keflex.  Feels like she needs an extended course.  She is still having dysuria and frequency.  Her blood pressure has also been very high at dialysis.'s taking nifedipine 90 mg daily.    Prior to Admission medications    Medication Sig Start Date End Date Taking? Authorizing Provider   cephALEXin (KEFLEX) 500 MG capsule Take 1 capsule by mouth 2 times daily for 5 days 4/17/24 4/22/24 Yes Rashid Vila MD   predniSONE (DELTASONE) 5 MG tablet Take 1 tablet by mouth daily 4/17/24  Yes Rashid Vila MD   dexlansoprazole (DEXILANT) 60 MG CPDR delayed release capsule Take once a day for acid reflux 4/17/24  Yes Rashid Vila MD   hydrALAZINE (APRESOLINE) 25 MG tablet Take 1 tablet by mouth 3 times daily 4/17/24  Yes Rashid Vila MD   montelukast (SINGULAIR) 10 MG tablet  3/5/24   ProviderShivani MD   losartan (COZAAR) 50 MG tablet  3/5/24   ProviderShivani MD   NIFEdipine (PROCARDIA XL) 90 MG extended release tablet Take 1 tablet by mouth daily 3/6/24   Rashid Vila MD   carvedilol (COREG) 6.25 MG tablet TAKE 1 TABLET BY MOUTH TWICE DAILY ON DAYS OF DIALYSIS 3/5/24   Rashid Vila MD   Capsaicin 0.1 % CREA Apply 1 g topically at bedtime 1/21/24   Homa Ochoa MD   fluticasone-umeclidin-vilant (TRELEGY ELLIPTA) 100-62.5-25 MCG/ACT AEPB inhaler INHALE 1 PUFF EVERY DAY 12/28/23   Rashid Vila MD   EPINEPHrine (EPIPEN) 0.3 MG/0.3ML SOAJ injection INJECT 0.3 ML INTO THE MUSCLE ONCE AS NEEDED FOR ALLERGIC RESPONSE 12/13/23   Rashid Vila MD   meclizine (ANTIVERT) 25 MG tablet TAKE 1 TABLET THREE TIMES DAILY AS NEEDED FOR DIZZINESS 11/8/23   Rashid Vila MD   Dextromethorphan-GG-APAP (CORICIDIN HBP COLD/COUGH/FLU) -325 MG/15ML LIQD Take 30 mLs by mouth every 8 hours as needed

## 2024-04-21 LAB — BACTERIA UR CULT: ABNORMAL

## 2024-04-29 ENCOUNTER — CARE COORDINATION (OUTPATIENT)
Facility: CLINIC | Age: 69
End: 2024-04-29

## 2024-04-29 DIAGNOSIS — I50.32 CHRONIC DIASTOLIC (CONGESTIVE) HEART FAILURE (HCC): ICD-10-CM

## 2024-04-29 DIAGNOSIS — I10 ESSENTIAL HYPERTENSION: Primary | ICD-10-CM

## 2024-04-29 DIAGNOSIS — J44.9 CHRONIC OBSTRUCTIVE PULMONARY DISEASE, UNSPECIFIED COPD TYPE (HCC): ICD-10-CM

## 2024-04-29 ASSESSMENT — PATIENT HEALTH QUESTIONNAIRE - PHQ9
SUM OF ALL RESPONSES TO PHQ QUESTIONS 1-9: 0
SUM OF ALL RESPONSES TO PHQ QUESTIONS 1-9: 0
SUM OF ALL RESPONSES TO PHQ9 QUESTIONS 1 & 2: 0
SUM OF ALL RESPONSES TO PHQ QUESTIONS 1-9: 0
2. FEELING DOWN, DEPRESSED OR HOPELESS: NOT AT ALL
SUM OF ALL RESPONSES TO PHQ QUESTIONS 1-9: 0
1. LITTLE INTEREST OR PLEASURE IN DOING THINGS: NOT AT ALL

## 2024-04-29 NOTE — CARE COORDINATION
corticosteroids.     Offered patient enrollment in the Remote Patient Monitoring (RPM) program for in-home monitoring: Yes, patient enrolled today:     Remote Patient Monitoring Enrollment Note    Date/Time:  4/29/2024 2:33 PM    Offered patient enrollment in the [facility] Remote Patient Monitoring (RPM) program for in home monitoring for CHF; condition managed by Julito Curtis. COPD; condition managed by Julito Curtis. HTN; condition managed by Julito Curtis.  Patient accepted.    Patient will be monitoring the following daily:  Blood Pressure, Pulse ox, Weight, and Survey questions    ACM reviewed the information below with the patient:    Emergency Contact (name and contact number): Valentina Membreno   [phone]    [x]  A member from the care coordination team will reach out to notify the patient once the RPM kit is ordered.  [x]  Once the kit is delivered, the HRS team will contact the patient after UPS delivers to assist with set up.  [x]  Determined BP cuff size: regular (9.05\"-15.74\")  [x]  Determined weight scale: regular (<330lbs)  [x]  Hours of ACM monitoring - Monday-Friday 2096-3710  [x]  It is important to take your vitals every day, even on the weekends, to keep your care team aware of how you are doing every day of the week.    All questions about RPM program answered at this time - does not want at this time.    Ambulatory Care Coordination Assessment    Care Coordination Protocol  Referral from Primary Care Provider: No  Week 1 - Initial Assessment     Do you have all of your prescriptions and are they filled?: Yes  Barriers to medication adherence: None  Are you able to afford your medications?: Yes  How often do you have trouble taking your medications the way you have been told to take them?: I always take them as prescribed.        Ability to seek help/take action for Emergent Urgent situations i.e. fire, crime, inclement weather or health crisis.: Needs Assistance  Ability to ambulate to

## 2024-04-29 NOTE — CARE COORDINATION
Remote Patient Kit Ordering Note      Date/Time:  4/29/2024 4:14 PM      [facility] notify [facility] RPM [facility] order; patient/family was unavailable. Was unable to leave a voicemail for the patient    [] [employer]SS confirmed patient shipping address  [] Patient will receive package over the next 1-3 business days. Someone 21 years or older must be present to sign for UPS delivery.  [] HRS will contact patient within 24 hours, an HRS  will call the patient directly: If the patient does not answer, HRS will follow up with the clinical team notifying them about the unsuccessful attempt to contact the patient. HRS will make three call attempts to the patient.Provide patient with [facility] Virtual install number is: [phone].  [] ACM will contact patient once equipment is active to welcome them to the program.                                                         [] Hours of RPM monitoring - Monday-Friday 9228-5336; encourage patient to get vitals entered by Noon each day to have the alert addressed same day.  [x][facility] mailed RPM Patient flyer to patient.                      ACM made aware the RPM kit has been ordered.      
Home

## 2024-04-29 NOTE — PROGRESS NOTES
Remote Patient Monitoring Treatment Plan    Received request from AC/CTN Jay Mclaughlin, RN   to order remote patient monitoring for in home monitoring of CHF; condition managed by Julito Curtis. COPD; condition managed by Julito Curtis. HTN; condition managed by Julito Curtis. and order completed.     Patient will be monitoring blood pressure   pulse ox   weight  survey questions.      Patient will engage in Remote Patient Monitoring each day to develop the skills necessary for self management.       RPM Care Team Responsibilities:   Alerts will be reviewed daily and addressed within 2-4 hours during operational hours (Monday -Friday 9 am-4 pm)  Alert response and intervention documented in patient medical record  Alert response escalated to PCP per protocol and documented in patient medical record  Patient monitored over approximately  days  Discharge from program based on self-management readiness    See care coordination encounters for additional details.

## 2024-04-30 ENCOUNTER — CARE COORDINATION (OUTPATIENT)
Facility: CLINIC | Age: 69
End: 2024-04-30

## 2024-04-30 NOTE — CARE COORDINATION
Called to obtain address for RPM delivery. RPM team notified of delivery address and equipment to be delivered.    Address updated in Demographics.

## 2024-05-06 ENCOUNTER — CARE COORDINATION (OUTPATIENT)
Facility: CLINIC | Age: 69
End: 2024-05-06

## 2024-05-06 ASSESSMENT — PATIENT HEALTH QUESTIONNAIRE - PHQ9
2. FEELING DOWN, DEPRESSED OR HOPELESS: NOT AT ALL
SUM OF ALL RESPONSES TO PHQ QUESTIONS 1-9: 0
1. LITTLE INTEREST OR PLEASURE IN DOING THINGS: NOT AT ALL
SUM OF ALL RESPONSES TO PHQ9 QUESTIONS 1 & 2: 0

## 2024-05-06 NOTE — CARE COORDINATION
Ambulatory Care Coordination Note  2024    Patient Current Location:  Home: [address]  [address] [place]  [address]  [place] [address]    ACM contacted the patient by telephone. Verified name and  with patient as identifiers. Provided introduction to self, and explanation of the ACM role.     ACM: Jay Mclaughlin RN    Challenges to be reviewed by the provider   Additional needs identified to be addressed with provider: No  none               Method of communication with provider: phone.     Spoke with patient - Chief Complaint of UTI that is requiring different meds to cure. No bleeding or pain reported on urination at this time.Advised Patient to complete all antibiotic meds.  No further notable change in Patient health status from last encounter. No ER visit or IP admission since last encounter. Follow up appointments listed below and questions from Patient / Care Giver answered.  Patient has ACM contact information.   Preliminary review of medications done during this encounter - will do full med rec on next encounter    Patient  was on Gabapentin once before and stated it helped with her pain. She also said she was once receiving shots for her back and knee pain.  I have requested from Dr. Vila that she be placed back on her Gabapentin and if possible to have her return to pain management. Her pain is reported to be very sharp and at the base of her spine on the right, occurs when she bends over.    Her RPM package has not arrived yet and I requested the RPM team to track the package.      CHF Protocol:   Daily weights, BP checks, monitor edema in lower legs and SOB.   Limit sodium intake, avoid foods high in sodium.    Monitor fluid intake as per PCP directions.   Notify PCP office for wt gain 3 lbs in 2 days or 5 lbs In 1 week    HTN Teaching        Always take medication as prescribed . Do not skip or stop medication unless specifically instructed to by MD or PCP. If you forget to

## 2024-05-08 ENCOUNTER — OFFICE VISIT (OUTPATIENT)
Facility: CLINIC | Age: 69
End: 2024-05-08
Payer: MEDICARE

## 2024-05-08 VITALS
OXYGEN SATURATION: 99 % | WEIGHT: 153.2 LBS | HEIGHT: 61 IN | DIASTOLIC BLOOD PRESSURE: 88 MMHG | BODY MASS INDEX: 28.92 KG/M2 | SYSTOLIC BLOOD PRESSURE: 205 MMHG | RESPIRATION RATE: 16 BRPM | HEART RATE: 82 BPM | TEMPERATURE: 97.7 F

## 2024-05-08 DIAGNOSIS — N39.0 URINARY TRACT INFECTION WITHOUT HEMATURIA, SITE UNSPECIFIED: Primary | ICD-10-CM

## 2024-05-08 DIAGNOSIS — I10 UNCONTROLLED HYPERTENSION: ICD-10-CM

## 2024-05-08 LAB
BILIRUBIN, URINE, POC: NEGATIVE
BLOOD URINE, POC: NORMAL
GLUCOSE URINE, POC: NEGATIVE
KETONES, URINE, POC: NEGATIVE
LEUKOCYTE ESTERASE, URINE, POC: NORMAL
NITRITE, URINE, POC: NEGATIVE
PH, URINE, POC: 7.5 (ref 4.6–8)
PROTEIN,URINE, POC: NORMAL
SPECIFIC GRAVITY, URINE, POC: 1.03 (ref 1–1.03)
URINALYSIS CLARITY, POC: CLEAR
URINALYSIS COLOR, POC: YELLOW
UROBILINOGEN, POC: NORMAL

## 2024-05-08 PROCEDURE — G8417 CALC BMI ABV UP PARAM F/U: HCPCS | Performed by: STUDENT IN AN ORGANIZED HEALTH CARE EDUCATION/TRAINING PROGRAM

## 2024-05-08 PROCEDURE — 1036F TOBACCO NON-USER: CPT | Performed by: STUDENT IN AN ORGANIZED HEALTH CARE EDUCATION/TRAINING PROGRAM

## 2024-05-08 PROCEDURE — 1090F PRES/ABSN URINE INCON ASSESS: CPT | Performed by: STUDENT IN AN ORGANIZED HEALTH CARE EDUCATION/TRAINING PROGRAM

## 2024-05-08 PROCEDURE — G8399 PT W/DXA RESULTS DOCUMENT: HCPCS | Performed by: STUDENT IN AN ORGANIZED HEALTH CARE EDUCATION/TRAINING PROGRAM

## 2024-05-08 PROCEDURE — 3079F DIAST BP 80-89 MM HG: CPT | Performed by: STUDENT IN AN ORGANIZED HEALTH CARE EDUCATION/TRAINING PROGRAM

## 2024-05-08 PROCEDURE — G8427 DOCREV CUR MEDS BY ELIG CLIN: HCPCS | Performed by: STUDENT IN AN ORGANIZED HEALTH CARE EDUCATION/TRAINING PROGRAM

## 2024-05-08 PROCEDURE — 1123F ACP DISCUSS/DSCN MKR DOCD: CPT | Performed by: STUDENT IN AN ORGANIZED HEALTH CARE EDUCATION/TRAINING PROGRAM

## 2024-05-08 PROCEDURE — 99214 OFFICE O/P EST MOD 30 MIN: CPT | Performed by: STUDENT IN AN ORGANIZED HEALTH CARE EDUCATION/TRAINING PROGRAM

## 2024-05-08 PROCEDURE — 3077F SYST BP >= 140 MM HG: CPT | Performed by: STUDENT IN AN ORGANIZED HEALTH CARE EDUCATION/TRAINING PROGRAM

## 2024-05-08 PROCEDURE — 3017F COLORECTAL CA SCREEN DOC REV: CPT | Performed by: STUDENT IN AN ORGANIZED HEALTH CARE EDUCATION/TRAINING PROGRAM

## 2024-05-08 PROCEDURE — 81003 URINALYSIS AUTO W/O SCOPE: CPT | Performed by: STUDENT IN AN ORGANIZED HEALTH CARE EDUCATION/TRAINING PROGRAM

## 2024-05-08 RX ORDER — DOXYCYCLINE HYCLATE 100 MG/1
CAPSULE ORAL
COMMUNITY
Start: 2024-04-01

## 2024-05-08 RX ORDER — HYDRALAZINE HYDROCHLORIDE 10 MG/1
10 TABLET, FILM COATED ORAL 3 TIMES DAILY
Qty: 270 TABLET | Refills: 0 | Status: SHIPPED | OUTPATIENT
Start: 2024-05-08 | End: 2024-08-06

## 2024-05-08 RX ORDER — CEPHALEXIN 500 MG/1
500 CAPSULE ORAL 2 TIMES DAILY
Qty: 20 CAPSULE | Refills: 0 | Status: SHIPPED | OUTPATIENT
Start: 2024-05-08 | End: 2024-05-18

## 2024-05-08 RX ORDER — PREDNISONE 5 MG/1
5 TABLET ORAL DAILY
Qty: 90 TABLET | Refills: 3 | Status: SHIPPED | OUTPATIENT
Start: 2024-05-08

## 2024-05-08 NOTE — PROGRESS NOTES
Jhoana Membreno presents today for   Chief Complaint   Patient presents with    Other     Patient has a possible UTI       Is someone accompanying this pt? no    Is the patient using any DME equipment during OV? no    Health Maintenance reviewed and discussed and ordered per Provider.    Health Maintenance Due   Topic Date Due    DTaP/Tdap/Td vaccine (1 - Tdap) Never done    Respiratory Syncytial Virus (RSV) Pregnant or age 60 yrs+ (1 - 1-dose 60+ series) Never done    Shingles vaccine (2 of 2) 04/04/2023    COVID-19 Vaccine (5 - 2023-24 season) 09/01/2023   .      \"Have you been to the ER, urgent care clinic since your last visit?  Hospitalized since your last visit?\"    NO    “Have you seen or consulted any other health care providers outside of [facility] since your last visit?”    NO            
AUTO W/O MICRO  -     cephALEXin (KEFLEX) 500 MG capsule; Take 1 capsule by mouth 2 times daily for 10 days, Disp-20 capsule, R-0Normal  -     Culture, Urine; Future  Uncontrolled hypertension  Comments:  Uncontrolled.  Asymptomatic.  Restart hydralazine 10 mg 3 times daily.  Follow-up in 2 weeks.  Orders:  -     hydrALAZINE (APRESOLINE) 10 MG tablet; Take 1 tablet by mouth 3 times daily, Disp-270 tablet, R-0Normal  -     Culture, Urine; Future             I have discussed the diagnosis with the patient and the intended plan as seen in the above orders.  The patient has received an after-visit summary and questions were answered concerning future plans.  I have discussed medication side effects and warnings with the patient as well. I have reviewed the plan of care with the patient, accepted their input and they are in agreement with the treatment goals. Previous lab and imaging results were reviewed by me.       Rashid Vila MD  May 8, 2024

## 2024-05-10 LAB
BACTERIA UR CULT: NORMAL
SPECIMEN STATUS REPORT: NORMAL

## 2024-05-14 ENCOUNTER — CARE COORDINATION (OUTPATIENT)
Dept: CARE COORDINATION | Age: 69
End: 2024-05-14

## 2024-05-14 ENCOUNTER — CARE COORDINATION (OUTPATIENT)
Facility: CLINIC | Age: 69
End: 2024-05-14

## 2024-05-14 NOTE — CARE COORDINATION
-Remote Alert Monitoring Note      Date/Time:  2024 11:50 AM  Patient Current Location: [place]  Verified patients name and  as identifiers.    Rpm alert to be reviewed by the provider   yellow alert  blood pressure reading (176/65)                   Background: Pt enrolled for HTN, COPD and CHF    Refer to 911 immediately if:  Patient unresponsive or unable to provide history  Change in cognition or sudden confusion  Patient unable to respond in complete sentences  Intense chest pain/tightness  Any concern for any clinical emergency  Red Alert: Provider response time of 1 hr required for any red alert requiring intervention  Yellow Alert: Provider response time of 3hr required for any escalated yellow alert    Patient Chief Complaint:  BP Triage  Are you having any Chest Pain? no   Are you having any Shortness of Breath? no   Do you have a headache or have any vision changes? no   Are you having any numbness or tingling? no   Are you having any other health concerns or issues? no       Clinical Interventions:  Spoke with patient, she reports she did not take her medications prior to checking BP as she had dialysis this morning. She states she is feeling ok. She did take her medications when she returned back home. She will recheck her BP later today. Other vitals are WNL, will monitor for updated BP reading         Plan/Follow Up: Will continue to review, monitor and address alerts with follow up based on severity of symptoms and risk factors.  **For any new or worsening symptoms, please contact your Provider or report to the nearest Emergency Room.**

## 2024-05-15 ENCOUNTER — CARE COORDINATION (OUTPATIENT)
Dept: CARE COORDINATION | Age: 69
End: 2024-05-15

## 2024-05-15 NOTE — CARE COORDINATION
-Remote Alert Monitoring Note      Date/Time:  5/15/2024 2:31 PM  Patient Current Location: [place]  Verified patients name and  as identifiers.    Rpm alert to be reviewed by the provider   yellow alert  blood pressure reading (172/78)                   Background: Pt enrolled for HTN CHF and COPD    Refer to 911 immediately if:  Patient unresponsive or unable to provide history  Change in cognition or sudden confusion  Patient unable to respond in complete sentences  Intense chest pain/tightness  Any concern for any clinical emergency  Red Alert: Provider response time of 1 hr required for any red alert requiring intervention  Yellow Alert: Provider response time of 3hr required for any escalated yellow alert    Patient Chief Complaint:  BP Triage  Are you having any Chest Pain? no   Are you having any Shortness of Breath? no   Do you have a headache or have any vision changes? no   Are you having any numbness or tingling? no   Are you having any other health concerns or issues? no       Clinical Interventions:  Spoke with patient, she reports she had dialysis today (she has it tues/wed/thurs) she reports she is feeling well, she took her BP prior to taking medications for the day. She got home and took medications, recheck BP was WNL at 155/62. Pt will take BP later in the day when she has dialysis and only after medications. She VU, other metrics are WNL        Plan/Follow Up: Will continue to review, monitor and address alerts with follow up based on severity of symptoms and risk factors.  **For any new or worsening symptoms, please contact your Provider or report to the nearest Emergency Room.**

## 2024-05-17 RX ORDER — FLUTICASONE FUROATE, UMECLIDINIUM BROMIDE AND VILANTEROL TRIFENATATE 100; 62.5; 25 UG/1; UG/1; UG/1
POWDER RESPIRATORY (INHALATION)
Qty: 90 EACH | Refills: 3 | Status: SHIPPED | OUTPATIENT
Start: 2024-05-17

## 2024-05-21 ENCOUNTER — CARE COORDINATION (OUTPATIENT)
Facility: CLINIC | Age: 69
End: 2024-05-21

## 2024-05-22 ENCOUNTER — OFFICE VISIT (OUTPATIENT)
Facility: CLINIC | Age: 69
End: 2024-05-22
Payer: MEDICARE

## 2024-05-22 ENCOUNTER — CARE COORDINATION (OUTPATIENT)
Dept: CARE COORDINATION | Age: 69
End: 2024-05-22

## 2024-05-22 VITALS
TEMPERATURE: 97.3 F | OXYGEN SATURATION: 99 % | HEIGHT: 61 IN | RESPIRATION RATE: 18 BRPM | DIASTOLIC BLOOD PRESSURE: 70 MMHG | SYSTOLIC BLOOD PRESSURE: 157 MMHG | WEIGHT: 152.8 LBS | BODY MASS INDEX: 28.85 KG/M2 | HEART RATE: 67 BPM

## 2024-05-22 DIAGNOSIS — I10 UNCONTROLLED HYPERTENSION: Primary | ICD-10-CM

## 2024-05-22 PROCEDURE — G8427 DOCREV CUR MEDS BY ELIG CLIN: HCPCS | Performed by: STUDENT IN AN ORGANIZED HEALTH CARE EDUCATION/TRAINING PROGRAM

## 2024-05-22 PROCEDURE — G8399 PT W/DXA RESULTS DOCUMENT: HCPCS | Performed by: STUDENT IN AN ORGANIZED HEALTH CARE EDUCATION/TRAINING PROGRAM

## 2024-05-22 PROCEDURE — 3078F DIAST BP <80 MM HG: CPT | Performed by: STUDENT IN AN ORGANIZED HEALTH CARE EDUCATION/TRAINING PROGRAM

## 2024-05-22 PROCEDURE — 3077F SYST BP >= 140 MM HG: CPT | Performed by: STUDENT IN AN ORGANIZED HEALTH CARE EDUCATION/TRAINING PROGRAM

## 2024-05-22 PROCEDURE — 3017F COLORECTAL CA SCREEN DOC REV: CPT | Performed by: STUDENT IN AN ORGANIZED HEALTH CARE EDUCATION/TRAINING PROGRAM

## 2024-05-22 PROCEDURE — 1036F TOBACCO NON-USER: CPT | Performed by: STUDENT IN AN ORGANIZED HEALTH CARE EDUCATION/TRAINING PROGRAM

## 2024-05-22 PROCEDURE — 1123F ACP DISCUSS/DSCN MKR DOCD: CPT | Performed by: STUDENT IN AN ORGANIZED HEALTH CARE EDUCATION/TRAINING PROGRAM

## 2024-05-22 PROCEDURE — G8417 CALC BMI ABV UP PARAM F/U: HCPCS | Performed by: STUDENT IN AN ORGANIZED HEALTH CARE EDUCATION/TRAINING PROGRAM

## 2024-05-22 PROCEDURE — 99214 OFFICE O/P EST MOD 30 MIN: CPT | Performed by: STUDENT IN AN ORGANIZED HEALTH CARE EDUCATION/TRAINING PROGRAM

## 2024-05-22 PROCEDURE — 1090F PRES/ABSN URINE INCON ASSESS: CPT | Performed by: STUDENT IN AN ORGANIZED HEALTH CARE EDUCATION/TRAINING PROGRAM

## 2024-05-22 RX ORDER — LOSARTAN POTASSIUM 50 MG/1
TABLET ORAL
COMMUNITY
Start: 2024-05-17 | End: 2024-05-22

## 2024-05-22 NOTE — CARE COORDINATION
Remote Patient Monitoring Note      Date/Time:  5/22/2024 2:54 PM  Patient Current Location: [facility] noted yellow alert received for blood pressure reading (180/78).   Background: pt enrolled for Chf COPD and HTN  Clinical Interventions:  BP noted, pt typically checks bp twice a day before and after medications, will monitor for updated BP if pt is able to recheck, other metrics WNL    Plan/Follow Up: Will continue to review, monitor and address alerts with follow up based on severity of symptoms and risk factors.

## 2024-05-22 NOTE — PROGRESS NOTES
Subjective:   Jhoana Membreno is a 68 y.o. female who was seen for Follow-up (2 wk follow up)    She continues to have high blood pressure that is uncontrolled.  Tried to take 120 mg of nifedipine per nephrology recommendations prior to dialysis the other day but her blood pressures were still in the 200s systolic at dialysis.  Reports it does not really seem to help when she takes her blood pressure medications prior to dialysis.  She is hesitant to make any changes though because she has had hypotension severe enough to put her in the hospital previously when we adjusted her medications before.  She is following low-salt diet and trying to get plenty of physical activity.    Prior to Admission medications    Medication Sig Start Date End Date Taking? Authorizing Provider   BEATRIZ ELLIPTA 100-62.5-25 MCG/ACT AEPB inhaler INHALE 1 PUFF EVERY DAY 5/17/24  Yes Rashid Vila MD   doxycycline hyclate (VIBRAMYCIN) 100 MG capsule Take by mouth 4/1/24  Yes ProviderShivani MD   hydrALAZINE (APRESOLINE) 10 MG tablet Take 1 tablet by mouth 3 times daily 5/8/24 8/6/24 Yes Rashid Vila MD   predniSONE (DELTASONE) 5 MG tablet Take 1 tablet by mouth daily 5/8/24  Yes Rashid Vila MD   dexlansoprazole (DEXILANT) 60 MG CPDR delayed release capsule Take once a day for acid reflux 4/17/24  Yes Rashid Vila MD   montelukast (SINGULAIR) 10 MG tablet  3/5/24  Yes ProviderShivani MD   NIFEdipine (PROCARDIA XL) 90 MG extended release tablet Take 1 tablet by mouth daily 3/6/24  Yes Rashid Vila MD   carvedilol (COREG) 6.25 MG tablet TAKE 1 TABLET BY MOUTH TWICE DAILY ON DAYS OF DIALYSIS 3/5/24  Yes Rashid Vila MD   Capsaicin 0.1 % CREA Apply 1 g topically at bedtime 1/21/24  Yes Homa Ochoa MD   EPINEPHrine (EPIPEN) 0.3 MG/0.3ML SOAJ injection INJECT 0.3 ML INTO THE MUSCLE ONCE AS NEEDED FOR ALLERGIC RESPONSE 12/13/23  Yes Rashid Vila MD   meclizine (ANTIVERT) 25 MG

## 2024-05-22 NOTE — PROGRESS NOTES
Chief Complaint   Patient presents with    Follow-up     2 wk follow up       Is someone accompanying this pt? no    Is the patient using any DME equipment during OV? no    Health Maintenance Due   Topic Date Due    DTaP/Tdap/Td vaccine (1 - Tdap) Never done    Respiratory Syncytial Virus (RSV) Pregnant or age 60 yrs+ (1 - 1-dose 60+ series) Never done    Shingles vaccine (2 of 2) 04/04/2023    COVID-19 Vaccine (5 - 2023-24 season) 09/01/2023       \"Have you been to the ER, urgent care clinic since your last visit?  Hospitalized since your last visit?\"    NO    “Have you seen or consulted any other health care providers outside of [facility] since your last visit?”    NO

## 2024-05-24 VITALS
DIASTOLIC BLOOD PRESSURE: 74 MMHG | HEART RATE: 68 BPM | OXYGEN SATURATION: 97 % | BODY MASS INDEX: 28.49 KG/M2 | WEIGHT: 150.8 LBS | SYSTOLIC BLOOD PRESSURE: 170 MMHG

## 2024-05-28 ENCOUNTER — CARE COORDINATION (OUTPATIENT)
Dept: CARE COORDINATION | Age: 69
End: 2024-05-28

## 2024-05-28 NOTE — CARE COORDINATION
Remote Patient Monitoring Note      Date/Time:  2024 1:42 PM  Patient Current Location: [facility] contacted patient by telephone regarding yellow alert received for blood pressure reading (175/). Verified patients name and  as identifiers.  Background: Pt enrolled for CHF, cOPD and HTN    Clinical Interventions:  Spoke with patient, she reports she has dialysis Tues/wed/thurs and does not take her medications until she returns home, then she checks her BP and recheck BP afterwards when medication is in her system. She reports they told her today that they pulled too much off at her tx, she did feel a bit weak during treatment but reports she is feeling better now., will monitor for repeat BP    Plan/Follow Up: Will continue to review, monitor and address alerts with follow up based on severity of symptoms and risk factors.

## 2024-05-29 ENCOUNTER — CARE COORDINATION (OUTPATIENT)
Dept: CARE COORDINATION | Age: 69
End: 2024-05-29

## 2024-05-29 NOTE — CARE COORDINATION
Date/Time:  5/29/2024 2:45 PM  LPN attempted to reach patient by telephone regarding red alert in remote patient monitoring program. Left HIPPA compliant message requesting a return call. Will attempt to reach patient again.  Pt typically retakes BP when she gets home and after taking medications for the day post dialysis tx

## 2024-05-30 ENCOUNTER — CARE COORDINATION (OUTPATIENT)
Dept: CARE COORDINATION | Age: 69
End: 2024-05-30

## 2024-05-30 NOTE — CARE COORDINATION
-Remote Alert Monitoring Note      Date/Time:  2024 1:38 PM  Patient Current Location: [place]  Verified patients name and  as identifiers.    Rpm alert to be reviewed by the provider   yellow alert  blood pressure reading (174/76)                     LPN contacted patient by telephone regarding red alert received   Background: Pt enrolled for HTN CHF COPD  Refer to 911 immediately if:  Patient unresponsive or unable to provide history  Change in cognition or sudden confusion  Patient unable to respond in complete sentences  Intense chest pain/tightness  Any concern for any clinical emergency  Red Alert: Provider response time of 1 hr required for any red alert requiring intervention  Yellow Alert: Provider response time of 3hr required for any escalated yellow alert  Patient Chief Complaint:  BP Triage  Are you having any Chest Pain? no   Are you having any Shortness of Breath? no   Do you have a headache or have any vision changes? no   Are you having any numbness or tingling? no   Are you having any other health concerns or issues? no     Clinical Interventions:  Spoke w/ pt, she had dialysis treatment today, she does not take medications prior but takes them when she returns home and gets BP before and after medications. She will recheck BP soon, she reports she is feeling well, no concerning sx to voice during call      Plan/Follow Up: Will continue to review, monitor and address alerts with follow up based on severity of symptoms and risk factors.  **For any new or worsening symptoms, please contact your Provider or report to the nearest Emergency Room.**

## 2024-06-06 ENCOUNTER — CARE COORDINATION (OUTPATIENT)
Dept: CARE COORDINATION | Age: 69
End: 2024-06-06

## 2024-06-06 NOTE — CARE COORDINATION
Remote Patient Monitoring Note      Date/Time:  2024 12:06 PM  Patient Current Location: [place]  LPN contacted patient by telephone regarding red alert received for pulse ox reading (91%). Verified patients name and  as identifiers.  Background: Pt enrolled for CHF, COPD and HTN  Clinical Interventions:  Spoke with patient, she reports she is doing well. She was trying to find our what time her appt was on the  information. Discussed low o2 reading, she reports it was actually higher, she is not SOB, speaking in full sentences. She was not able to recheck during our call. Other metrics are WNL    Plan/Follow Up: Will continue to review, monitor and address alerts with follow up based on severity of symptoms and risk factors.

## 2024-06-07 ENCOUNTER — CARE COORDINATION (OUTPATIENT)
Facility: CLINIC | Age: 69
End: 2024-06-07

## 2024-06-07 NOTE — PROGRESS NOTES
Notified pt and NP that dialysis will not be done until 1900 or after, putting her at a 2300 or later discharge. Pt will not have a ride that late. OK to discharge pt in the morning. none

## 2024-06-11 ENCOUNTER — CARE COORDINATION (OUTPATIENT)
Dept: CARE COORDINATION | Age: 69
End: 2024-06-11

## 2024-06-11 ENCOUNTER — OFFICE VISIT (OUTPATIENT)
Facility: CLINIC | Age: 69
End: 2024-06-11
Payer: MEDICARE

## 2024-06-11 ENCOUNTER — CARE COORDINATION (OUTPATIENT)
Facility: CLINIC | Age: 69
End: 2024-06-11

## 2024-06-11 VITALS
TEMPERATURE: 98.2 F | SYSTOLIC BLOOD PRESSURE: 168 MMHG | DIASTOLIC BLOOD PRESSURE: 63 MMHG | HEIGHT: 61 IN | OXYGEN SATURATION: 98 % | RESPIRATION RATE: 18 BRPM | WEIGHT: 153 LBS | BODY MASS INDEX: 28.89 KG/M2 | HEART RATE: 82 BPM

## 2024-06-11 DIAGNOSIS — R05.1 ACUTE COUGH: Primary | ICD-10-CM

## 2024-06-11 DIAGNOSIS — E78.2 MIXED HYPERLIPIDEMIA: ICD-10-CM

## 2024-06-11 DIAGNOSIS — J30.89 NON-SEASONAL ALLERGIC RHINITIS, UNSPECIFIED TRIGGER: ICD-10-CM

## 2024-06-11 PROCEDURE — G8427 DOCREV CUR MEDS BY ELIG CLIN: HCPCS | Performed by: NURSE PRACTITIONER

## 2024-06-11 PROCEDURE — 3017F COLORECTAL CA SCREEN DOC REV: CPT | Performed by: NURSE PRACTITIONER

## 2024-06-11 PROCEDURE — 3077F SYST BP >= 140 MM HG: CPT | Performed by: NURSE PRACTITIONER

## 2024-06-11 PROCEDURE — 99214 OFFICE O/P EST MOD 30 MIN: CPT | Performed by: NURSE PRACTITIONER

## 2024-06-11 PROCEDURE — 3078F DIAST BP <80 MM HG: CPT | Performed by: NURSE PRACTITIONER

## 2024-06-11 PROCEDURE — G8417 CALC BMI ABV UP PARAM F/U: HCPCS | Performed by: NURSE PRACTITIONER

## 2024-06-11 PROCEDURE — 1036F TOBACCO NON-USER: CPT | Performed by: NURSE PRACTITIONER

## 2024-06-11 PROCEDURE — 1123F ACP DISCUSS/DSCN MKR DOCD: CPT | Performed by: NURSE PRACTITIONER

## 2024-06-11 PROCEDURE — G8399 PT W/DXA RESULTS DOCUMENT: HCPCS | Performed by: NURSE PRACTITIONER

## 2024-06-11 PROCEDURE — 1090F PRES/ABSN URINE INCON ASSESS: CPT | Performed by: NURSE PRACTITIONER

## 2024-06-11 RX ORDER — CETIRIZINE HYDROCHLORIDE 10 MG/1
10 TABLET ORAL DAILY
Qty: 60 TABLET | Refills: 1 | Status: SHIPPED | OUTPATIENT
Start: 2024-06-11

## 2024-06-11 RX ORDER — CODEINE PHOSPHATE/GUAIFENESIN 10-100MG/5
5 LIQUID (ML) ORAL 2 TIMES DAILY PRN
Qty: 118 ML | Refills: 0 | Status: SHIPPED | OUTPATIENT
Start: 2024-06-11 | End: 2024-06-23

## 2024-06-11 NOTE — CARE COORDINATION
Remote Patient Monitoring Note      Date/Time:  6/11/2024 1:46 PM  Patient Current Location: [facility] noted yellow alert received for blood pressure reading (172/76).   Background: Pt enrolled for CHF. COPD and HTN  Clinical Interventions:  BP noted, pt has dialysis tues/wed/thurs, she typically comes home and takes BP medicine, checks BP and rechecks BP 1-2 hours after taking medicine. Will monitor for repeat BP     Plan/Follow Up: Will continue to review, monitor and address alerts with follow up based on severity of symptoms and risk factors.

## 2024-06-11 NOTE — PROGRESS NOTES
Patient has had a tingling like sensation in throat since Friday.     Chief Complaint   Patient presents with    Cough     Dry cough since Friday        \"Have you been to the ER, urgent care clinic since your last visit?  Hospitalized since your last visit?\"    NO    “Have you seen or consulted any other health care providers outside of [facility] since your last visit?”    NO            
Post-Anesthesia Evaluation and Assessment    Patient: Abbi Diamond MRN: [id number]  SSN: [id number]    YOB: 1948  Age: 79 y.o. Sex: male       Cardiovascular Function/Vital Signs  Visit Vitals    /60    Pulse 64    Temp 37 °C (98.6 °F)    Resp 14    Ht 5' 10\" (1.778 m)    Wt 95.7 kg (211 lb)    SpO2 99%    BMI 30.28 kg/m2       Patient is status post total IV anesthesia anesthesia for Procedure(s):  ENDOSCOPIC ULTRASOUND (EUS)  BMI UNKNOWN. Nausea/Vomiting: None    Postoperative hydration reviewed and adequate. Pain:  Pain Scale 1: Visual (08/23/17 1547)  Pain Intensity 1: 0 (08/23/17 1547)   Managed    Neurological Status: At baseline    Mental Status and Level of Consciousness: Arousable    Pulmonary Status:   Room air  Adequate oxygenation and airway patent    Complications related to anesthesia: None    Post-anesthesia assessment completed.  No concerns    Signed By: Tashi Logan MD     August 23, 2017
  Final    Specific Gravity, Urine, POC 03/26/2024 1.020  1.001 - 1.035 Final    Blood, Urine, POC 03/26/2024 1+   Final    pH, Urine, POC 03/26/2024 7.5  4.6 - 8.0 Final    Protein, Urine, POC 03/26/2024 3+   Final    Urobilinogen, POC 03/26/2024 0.2 mg/dL   Final    Nitrite, Urine, POC 03/26/2024 Negative   Final    Leukocyte Esterase, Urine, POC 03/26/2024 1+   Final   Admission on 03/18/2024, Discharged on 03/18/2024   Component Date Value Ref Range Status    WBC 03/18/2024 7.7  4.6 - 13.2 K/uL Final    RBC 03/18/2024 3.06 (L)  4.20 - 5.30 M/uL Final    Hemoglobin 03/18/2024 9.8 (L)  12.0 - 16.0 g/dL Final    Hematocrit 03/18/2024 31.1 (L)  35.0 - 45.0 % Final    MCV 03/18/2024 101.6 (H)  78.0 - 100.0 FL Final    MCH 03/18/2024 32.0  24.0 - 34.0 PG Final    MCHC 03/18/2024 31.5  31.0 - 37.0 g/dL Final    RDW 03/18/2024 14.2  11.6 - 14.5 % Final    Platelets 03/18/2024 165  135 - 420 K/uL Final    MPV 03/18/2024 9.5  9.2 - 11.8 FL Final    Nucleated RBCs 03/18/2024 0.0  0.0  WBC Final    nRBC 03/18/2024 0.00  0.00 - 0.01 K/uL Final    Neutrophils % 03/18/2024 59  40 - 73 % Final    Lymphocytes % 03/18/2024 25  21 - 52 % Final    Monocytes % 03/18/2024 11 (H)  3 - 10 % Final    Eosinophils % 03/18/2024 4  0 - 5 % Final    Basophils % 03/18/2024 1  0 - 2 % Final    Immature Granulocytes % 03/18/2024 0  0 - 0.5 % Final    Neutrophils Absolute 03/18/2024 4.5  1.8 - 8.0 K/UL Final    Lymphocytes Absolute 03/18/2024 2.0  0.9 - 3.6 K/UL Final    Monocytes Absolute 03/18/2024 0.9  0.05 - 1.2 K/UL Final    Eosinophils Absolute 03/18/2024 0.3  0.0 - 0.4 K/UL Final    Basophils Absolute 03/18/2024 0.1  0.0 - 0.1 K/UL Final    Immature Granulocytes Absolute 03/18/2024 0.0  0.00 - 0.04 K/UL Final    Differential Type 03/18/2024 AUTOMATED    Final    Sodium 03/18/2024 143  136 - 145 mmol/L Final    Potassium 03/18/2024 3.8  3.5 - 5.5 mmol/L Final    Chloride 03/18/2024 107  100 - 111 mmol/L Final    CO2 03/18/2024 27

## 2024-06-12 ENCOUNTER — CARE COORDINATION (OUTPATIENT)
Facility: CLINIC | Age: 69
End: 2024-06-12

## 2024-06-17 ENCOUNTER — CARE COORDINATION (OUTPATIENT)
Facility: CLINIC | Age: 69
End: 2024-06-17

## 2024-06-17 ASSESSMENT — PATIENT HEALTH QUESTIONNAIRE - PHQ9
SUM OF ALL RESPONSES TO PHQ QUESTIONS 1-9: 0
SUM OF ALL RESPONSES TO PHQ QUESTIONS 1-9: 0
1. LITTLE INTEREST OR PLEASURE IN DOING THINGS: NOT AT ALL
SUM OF ALL RESPONSES TO PHQ9 QUESTIONS 1 & 2: 0
2. FEELING DOWN, DEPRESSED OR HOPELESS: NOT AT ALL
SUM OF ALL RESPONSES TO PHQ QUESTIONS 1-9: 0
SUM OF ALL RESPONSES TO PHQ QUESTIONS 1-9: 0

## 2024-06-17 NOTE — CARE COORDINATION
Ambulatory Care Coordination Note     2024 3:24 PM     Patient Current Location:  Home: [address]  [address] [place]  [address]  [place] [address]     ACM contacted the patient by telephone. Verified name and  with patient as identifiers.         ACM: Jay Mclaughlin RN     Challenges to be reviewed by the provider   Additional needs identified to be addressed with provider No  none               Method of communication with provider: phone.    Care Summary Note:   No notable change in Patient health status from last encounter. No ER visit or IP admission since last encounter. Follow up appointments listed below and questions from Patient / Care Giver answered.  Patient has ACM contact information.   Doing well at Dialysis - receiving full run at last appointment  No issue with Dialysis access.   Review of medications with attention to any side effects and determination that patient has sufficient quantity of meds and/or refills.  Assessment done with attention to primary illness and / or condition.  Respiratory and cardiac status shows no issues at present    Offered patient enrollment in the Remote Patient Monitoring (RPM) program for in-home monitoring: Yes, but did not enroll at this time: limited patient ability to navigate RPM/equipment.     Assessments Completed:   Ambulatory Care Coordination Assessment    Care Coordination Protocol  Week 1 - Initial Assessment     Do you have all of your prescriptions and are they filled?: Yes  Barriers to medication adherence: None  Are you able to afford your medications?: Yes  How often do you have trouble taking your medications the way you have been told to take them?: I always take them as prescribed.        Ability to seek help/take action for Emergent Urgent situations i.e. fire, crime, inclement weather or health crisis.: Needs Assistance  Ability to ambulate to restroom: Needs Assistance  Ability handle personal hygeine needs

## 2024-06-18 ENCOUNTER — CARE COORDINATION (OUTPATIENT)
Dept: CARE COORDINATION | Age: 69
End: 2024-06-18

## 2024-06-18 NOTE — CARE COORDINATION
Date/Time:  6/18/2024 12:49 PM  LPN attempted to reach patient by telephone regarding red alert in remote patient monitoring program. No answer at this time and voice mailbox full.  Unable to leave HIPPA compliant message requesting a return call. Will attempt to reach patient again.

## 2024-06-18 NOTE — CARE COORDINATION
06/18/24 3:19 PM Patient is currently enrolled in Remote Patient Monitoring for CHF, COPD, and HTN.  Second attempt to reach pt to review RPM red alert with no answer and voice mailbox full.  Unable to leave message requesting return call.  Outreach made to emergency contact, Valentina Membreno and she will attempt to reach pt and have her call nurse today before 4 pm.

## 2024-06-18 NOTE — CARE COORDINATION
Remote Alert Monitoring Note  Date/Time:  6/18/2024  4:06 PM  Patient Current Location: [facility] alert to be reviewed by the provider   red alert  blood pressure reading (187/77)  Additional needs to be addressed by provider: No     Background: Pt enrolled in RPM r/t CHF, COPD, HTN.  Clinical Interventions: Reviewed and followed up on alerts and treatments-No updated BP or return call received as of this time.  Will route to [facility] to update.   Plan/Follow Up: Will continue to review, monitor and address alerts with follow up based on severity of symptoms and risk factors.

## 2024-06-21 ENCOUNTER — CARE COORDINATION (OUTPATIENT)
Dept: CARE COORDINATION | Age: 69
End: 2024-06-21

## 2024-06-21 NOTE — CARE COORDINATION
-Remote Alert Monitoring Note      Date/Time:  2024 1:00 PM  Patient Current Location: [place]  Verified patients name and  as identifiers.    Rpm alert to be reviewed by the provider   yellow alert  blood pressure reading (172/73)  Vitals Recheck blood pressure reading (155/65)                     LPN contacted patient by telephone regarding red alert received   Background: Pt enrolled for HTN, CHF and COPD  Refer to 911 immediately if:  Patient unresponsive or unable to provide history  Change in cognition or sudden confusion  Patient unable to respond in complete sentences  Intense chest pain/tightness  Any concern for any clinical emergency  Red Alert: Provider response time of 1 hr required for any red alert requiring intervention  Yellow Alert: Provider response time of 3hr required for any escalated yellow alert  Patient Chief Complaint:  BP Triage  Are you having any Chest Pain? no   Are you having any Shortness of Breath? no   Do you have a headache or have any vision changes? no   Are you having any numbness or tingling? no   Are you having any other health concerns or issues? no     Clinical Interventions:  Spoke with pt daughter and EC, Valentina, after attempting to reach pt on home phone but the call did not go through, she called pt on 3-way. Pt reports she is doing well and did not wait long enough after taking medications to recheck BP. She reports her recheck was 155/65, she has no concerning sx to note. Added recheck BP manually into HRS, other metrics are WNL.     Plan/Follow Up: Will continue to review, monitor and address alerts with follow up based on severity of symptoms and risk factors.  **For any new or worsening symptoms, please contact your Provider or report to the nearest Emergency Room.**

## 2024-06-24 ENCOUNTER — CARE COORDINATION (OUTPATIENT)
Facility: CLINIC | Age: 69
End: 2024-06-24

## 2024-06-24 PROBLEM — E87.20 METABOLIC ACIDOSIS: Status: ACTIVE | Noted: 2019-01-13

## 2024-06-24 PROBLEM — R06.02 SHORTNESS OF BREATH: Status: ACTIVE | Noted: 2022-08-10

## 2024-06-24 PROBLEM — E07.1 HYPOTHYROIDISM DUE TO DEFECT IN THYROID HORMONE SYNTHESIS: Status: ACTIVE | Noted: 2023-09-08

## 2024-06-24 PROBLEM — R03.0 ELEVATED BLOOD-PRESSURE READING, WITHOUT DIAGNOSIS OF HYPERTENSION: Status: ACTIVE | Noted: 2023-05-13

## 2024-06-24 PROBLEM — R52 PAIN, UNSPECIFIED: Status: ACTIVE | Noted: 2022-08-10

## 2024-06-24 PROBLEM — N25.81 SECONDARY HYPERPARATHYROIDISM OF RENAL ORIGIN (HCC): Chronic | Status: ACTIVE | Noted: 2022-10-05

## 2024-06-24 PROBLEM — K51.00 PANCOLITIS (HCC): Status: ACTIVE | Noted: 2022-02-11

## 2024-06-24 PROBLEM — R10.32 LEFT LOWER QUADRANT PAIN: Status: ACTIVE | Noted: 2024-03-26

## 2024-06-24 PROBLEM — I48.3 TYPICAL ATRIAL FLUTTER (HCC): Status: ACTIVE | Noted: 2018-05-30

## 2024-06-24 PROBLEM — D50.9 IRON DEFICIENCY ANEMIA, UNSPECIFIED: Status: ACTIVE | Noted: 2022-08-10

## 2024-06-24 PROBLEM — I77.0 ARTERIOVENOUS FISTULA, ACQUIRED (HCC): Status: ACTIVE | Noted: 2021-09-09

## 2024-06-24 PROBLEM — D68.9 COAGULATION DEFECT, UNSPECIFIED (HCC): Status: ACTIVE | Noted: 2022-08-10

## 2024-06-24 PROBLEM — E27.40 ADRENAL INSUFFICIENCY (HCC): Status: ACTIVE | Noted: 2024-06-24

## 2024-06-24 PROBLEM — E83.39 HYPERPHOSPHATEMIA: Status: ACTIVE | Noted: 2023-11-13

## 2024-06-24 PROBLEM — T82.9XXA COMPLICATION OF ARTERIOVENOUS DIALYSIS FISTULA: Status: ACTIVE | Noted: 2021-09-09

## 2024-06-24 PROBLEM — K62.5 RECTAL BLEEDING: Status: ACTIVE | Noted: 2020-06-15

## 2024-06-24 PROBLEM — J44.9 CHRONIC OBSTRUCTIVE PULMONARY DISEASE, UNSPECIFIED (HCC): Status: ACTIVE | Noted: 2023-06-19

## 2024-06-24 PROBLEM — N18.6 END-STAGE RENAL DISEASE ON HEMODIALYSIS (HCC): Status: ACTIVE | Noted: 2024-06-24

## 2024-06-24 PROBLEM — N19 RENAL FAILURE: Status: ACTIVE | Noted: 2024-06-24

## 2024-06-24 PROBLEM — Z99.2 END-STAGE RENAL DISEASE ON HEMODIALYSIS (HCC): Status: ACTIVE | Noted: 2024-06-24

## 2024-06-24 PROBLEM — F41.9 ANXIETY: Status: ACTIVE | Noted: 2023-06-19

## 2024-06-24 PROBLEM — E88.09 OTHER DISORDERS OF PLASMA-PROTEIN METABOLISM, NOT ELSEWHERE CLASSIFIED: Status: ACTIVE | Noted: 2023-07-06

## 2024-06-24 PROBLEM — J39.3: Status: ACTIVE | Noted: 2022-08-10

## 2024-06-24 PROBLEM — R10.84 GENERALIZED ABDOMINAL PAIN: Status: ACTIVE | Noted: 2020-06-15

## 2024-06-24 PROBLEM — Z99.2 DEPENDENCE ON RENAL DIALYSIS (HCC): Chronic | Status: ACTIVE | Noted: 2023-06-19

## 2024-06-24 PROBLEM — R79.1 SUBTHERAPEUTIC INTERNATIONAL NORMALIZED RATIO (INR): Status: ACTIVE | Noted: 2024-06-24

## 2024-06-24 PROBLEM — M25.421 SWELLING OF JOINT OF UPPER ARM, RIGHT: Status: ACTIVE | Noted: 2022-03-23

## 2024-06-24 PROBLEM — E27.49 SECONDARY ADRENAL INSUFFICIENCY (HCC): Status: ACTIVE | Noted: 2022-03-23

## 2024-06-27 ENCOUNTER — OFFICE VISIT (OUTPATIENT)
Facility: CLINIC | Age: 69
End: 2024-06-27
Payer: MEDICARE

## 2024-06-27 VITALS
HEIGHT: 61 IN | BODY MASS INDEX: 28.04 KG/M2 | OXYGEN SATURATION: 99 % | TEMPERATURE: 97.7 F | SYSTOLIC BLOOD PRESSURE: 157 MMHG | WEIGHT: 148.5 LBS | DIASTOLIC BLOOD PRESSURE: 67 MMHG | RESPIRATION RATE: 18 BRPM | HEART RATE: 68 BPM

## 2024-06-27 DIAGNOSIS — N30.00 ACUTE CYSTITIS WITHOUT HEMATURIA: Primary | ICD-10-CM

## 2024-06-27 DIAGNOSIS — E78.2 MIXED HYPERLIPIDEMIA: ICD-10-CM

## 2024-06-27 DIAGNOSIS — E03.9 HYPOTHYROIDISM, UNSPECIFIED TYPE: ICD-10-CM

## 2024-06-27 DIAGNOSIS — N18.6 ESRD (END STAGE RENAL DISEASE) ON DIALYSIS (HCC): ICD-10-CM

## 2024-06-27 DIAGNOSIS — I50.32 CHRONIC DIASTOLIC (CONGESTIVE) HEART FAILURE (HCC): ICD-10-CM

## 2024-06-27 DIAGNOSIS — Z86.718 HISTORY OF DVT (DEEP VEIN THROMBOSIS): ICD-10-CM

## 2024-06-27 DIAGNOSIS — I10 ESSENTIAL HYPERTENSION: ICD-10-CM

## 2024-06-27 DIAGNOSIS — I48.91 ATRIAL FIBRILLATION, UNSPECIFIED TYPE (HCC): ICD-10-CM

## 2024-06-27 DIAGNOSIS — Z94.0 HISTORY OF KIDNEY TRANSPLANT: ICD-10-CM

## 2024-06-27 DIAGNOSIS — Z99.2 ESRD (END STAGE RENAL DISEASE) ON DIALYSIS (HCC): ICD-10-CM

## 2024-06-27 PROBLEM — E16.2 HYPOGLYCEMIA: Status: RESOLVED | Noted: 2023-02-14 | Resolved: 2024-06-27

## 2024-06-27 PROBLEM — I77.0 ARTERIOVENOUS FISTULA, ACQUIRED (HCC): Status: RESOLVED | Noted: 2021-09-09 | Resolved: 2024-06-27

## 2024-06-27 PROBLEM — E87.20 METABOLIC ACIDOSIS: Status: RESOLVED | Noted: 2019-01-13 | Resolved: 2024-06-27

## 2024-06-27 PROBLEM — D68.9 COAGULATION DEFECT, UNSPECIFIED (HCC): Status: RESOLVED | Noted: 2022-08-10 | Resolved: 2024-06-27

## 2024-06-27 PROBLEM — I95.9 HYPOTENSION: Status: RESOLVED | Noted: 2023-07-11 | Resolved: 2024-06-27

## 2024-06-27 PROBLEM — R11.2 NAUSEA & VOMITING: Status: RESOLVED | Noted: 2023-12-13 | Resolved: 2024-06-27

## 2024-06-27 PROBLEM — R03.0 ELEVATED BLOOD-PRESSURE READING, WITHOUT DIAGNOSIS OF HYPERTENSION: Status: RESOLVED | Noted: 2023-05-13 | Resolved: 2024-06-27

## 2024-06-27 PROBLEM — E27.40 ADRENAL INSUFFICIENCY (HCC): Status: RESOLVED | Noted: 2024-06-24 | Resolved: 2024-06-27

## 2024-06-27 PROBLEM — J39.3: Status: RESOLVED | Noted: 2022-08-10 | Resolved: 2024-06-27

## 2024-06-27 PROBLEM — E87.70 VOLUME OVERLOAD: Status: RESOLVED | Noted: 2022-11-21 | Resolved: 2024-06-27

## 2024-06-27 PROBLEM — B02.29 POST HERPETIC NEURALGIA: Status: RESOLVED | Noted: 2023-07-01 | Resolved: 2024-06-27

## 2024-06-27 PROBLEM — D50.9 IRON DEFICIENCY ANEMIA, UNSPECIFIED: Status: RESOLVED | Noted: 2022-08-10 | Resolved: 2024-06-27

## 2024-06-27 PROBLEM — K51.00 PANCOLITIS (HCC): Status: RESOLVED | Noted: 2022-02-11 | Resolved: 2024-06-27

## 2024-06-27 PROBLEM — R42 VERTIGO: Status: RESOLVED | Noted: 2020-12-24 | Resolved: 2024-06-27

## 2024-06-27 PROBLEM — R06.02 SHORTNESS OF BREATH: Status: RESOLVED | Noted: 2022-08-10 | Resolved: 2024-06-27

## 2024-06-27 PROBLEM — E27.49 SECONDARY ADRENAL INSUFFICIENCY (HCC): Status: RESOLVED | Noted: 2022-03-23 | Resolved: 2024-06-27

## 2024-06-27 PROBLEM — N19 RENAL FAILURE: Status: RESOLVED | Noted: 2024-06-24 | Resolved: 2024-06-27

## 2024-06-27 PROBLEM — R79.1 SUBTHERAPEUTIC INTERNATIONAL NORMALIZED RATIO (INR): Status: RESOLVED | Noted: 2024-06-24 | Resolved: 2024-06-27

## 2024-06-27 PROBLEM — R42 DIZZINESS: Status: RESOLVED | Noted: 2023-06-29 | Resolved: 2024-06-27

## 2024-06-27 PROBLEM — E87.1 HYPONATREMIA: Status: RESOLVED | Noted: 2023-07-11 | Resolved: 2024-06-27

## 2024-06-27 PROBLEM — E88.09 OTHER DISORDERS OF PLASMA-PROTEIN METABOLISM, NOT ELSEWHERE CLASSIFIED: Status: RESOLVED | Noted: 2023-07-06 | Resolved: 2024-06-27

## 2024-06-27 PROBLEM — R52 PAIN, UNSPECIFIED: Status: RESOLVED | Noted: 2022-08-10 | Resolved: 2024-06-27

## 2024-06-27 PROBLEM — K62.5 RECTAL BLEEDING: Status: RESOLVED | Noted: 2020-06-15 | Resolved: 2024-06-27

## 2024-06-27 PROBLEM — J44.9 CHRONIC OBSTRUCTIVE PULMONARY DISEASE, UNSPECIFIED (HCC): Status: RESOLVED | Noted: 2023-06-19 | Resolved: 2024-06-27

## 2024-06-27 PROBLEM — N25.81 SECONDARY HYPERPARATHYROIDISM OF RENAL ORIGIN (HCC): Chronic | Status: RESOLVED | Noted: 2022-10-05 | Resolved: 2024-06-27

## 2024-06-27 LAB
BILIRUBIN, URINE, POC: NORMAL
BLOOD URINE, POC: NORMAL
GLUCOSE URINE, POC: NORMAL
KETONES, URINE, POC: NEGATIVE
LEUKOCYTE ESTERASE, URINE, POC: NORMAL
NITRITE, URINE, POC: NORMAL
PH, URINE, POC: 6.5 (ref 4.6–8)
PROTEIN,URINE, POC: NORMAL
SPECIFIC GRAVITY, URINE, POC: 1.02 (ref 1–1.03)
URINALYSIS CLARITY, POC: CLEAR
URINALYSIS COLOR, POC: YELLOW
UROBILINOGEN, POC: NORMAL

## 2024-06-27 PROCEDURE — 99215 OFFICE O/P EST HI 40 MIN: CPT | Performed by: STUDENT IN AN ORGANIZED HEALTH CARE EDUCATION/TRAINING PROGRAM

## 2024-06-27 PROCEDURE — 3017F COLORECTAL CA SCREEN DOC REV: CPT | Performed by: STUDENT IN AN ORGANIZED HEALTH CARE EDUCATION/TRAINING PROGRAM

## 2024-06-27 PROCEDURE — 1123F ACP DISCUSS/DSCN MKR DOCD: CPT | Performed by: STUDENT IN AN ORGANIZED HEALTH CARE EDUCATION/TRAINING PROGRAM

## 2024-06-27 PROCEDURE — 1090F PRES/ABSN URINE INCON ASSESS: CPT | Performed by: STUDENT IN AN ORGANIZED HEALTH CARE EDUCATION/TRAINING PROGRAM

## 2024-06-27 PROCEDURE — 81001 URINALYSIS AUTO W/SCOPE: CPT | Performed by: STUDENT IN AN ORGANIZED HEALTH CARE EDUCATION/TRAINING PROGRAM

## 2024-06-27 PROCEDURE — G8427 DOCREV CUR MEDS BY ELIG CLIN: HCPCS | Performed by: STUDENT IN AN ORGANIZED HEALTH CARE EDUCATION/TRAINING PROGRAM

## 2024-06-27 PROCEDURE — 1036F TOBACCO NON-USER: CPT | Performed by: STUDENT IN AN ORGANIZED HEALTH CARE EDUCATION/TRAINING PROGRAM

## 2024-06-27 PROCEDURE — 3078F DIAST BP <80 MM HG: CPT | Performed by: STUDENT IN AN ORGANIZED HEALTH CARE EDUCATION/TRAINING PROGRAM

## 2024-06-27 PROCEDURE — G8399 PT W/DXA RESULTS DOCUMENT: HCPCS | Performed by: STUDENT IN AN ORGANIZED HEALTH CARE EDUCATION/TRAINING PROGRAM

## 2024-06-27 PROCEDURE — G8417 CALC BMI ABV UP PARAM F/U: HCPCS | Performed by: STUDENT IN AN ORGANIZED HEALTH CARE EDUCATION/TRAINING PROGRAM

## 2024-06-27 PROCEDURE — 3077F SYST BP >= 140 MM HG: CPT | Performed by: STUDENT IN AN ORGANIZED HEALTH CARE EDUCATION/TRAINING PROGRAM

## 2024-06-27 RX ORDER — CEPHALEXIN 500 MG/1
500 CAPSULE ORAL 2 TIMES DAILY
Qty: 20 CAPSULE | Refills: 0 | Status: SHIPPED | OUTPATIENT
Start: 2024-06-27 | End: 2024-07-07

## 2024-06-27 RX ORDER — HYDROCODONE BITARTRATE AND ACETAMINOPHEN 5; 325 MG/1; MG/1
1 TABLET ORAL
COMMUNITY
Start: 2024-05-14

## 2024-06-27 NOTE — PROGRESS NOTES
Jhoana Membreno presents today for   Chief Complaint   Patient presents with    Frequent/Recurrent UTI       Is someone accompanying this pt? non    Is the patient using any DME equipment during OV? no    Health Maintenance Due   Topic Date Due    DTaP/Tdap/Td vaccine (1 - Tdap) Never done    Respiratory Syncytial Virus (RSV) Pregnant or age 60 yrs+ (1 - 1-dose 60+ series) Never done    Shingles vaccine (2 of 2) 04/04/2023    COVID-19 Vaccine (5 - 2023-24 season) 09/01/2023    Lipids  07/01/2024       \"Have you been to the ER, urgent care clinic since your last visit?  Hospitalized since your last visit?\"    NO    “Have you seen or consulted any other health care providers outside of [facility] since your last visit?”    NO

## 2024-06-27 NOTE — PROGRESS NOTES
Subjective:   Jhoana Membreno is a 68 y.o. female who was seen for Frequent/Recurrent UTI    She feels like she has recurrent UTI.  Having burning and frequency.  Has the over-the-counter home strips and it was positive.  No fevers or chills    Prior to Admission medications    Medication Sig Start Date End Date Taking? Authorizing Provider   DM-Doxylamine-Acetaminophen 15-6. MG/15ML LIQD Take 15 mLs by mouth every 8 (eight) hours 5/14/24  Yes Shivani Meza MD   HYDROcodone-acetaminophen (NORCO) 5-325 MG per tablet Take 1 tablet by mouth. 5/14/24  Yes Shivani Meza MD   cephALEXin (KEFLEX) 500 MG capsule Take 1 capsule by mouth 2 times daily for 10 days 6/27/24 7/7/24 Yes Rashid Vila MD   cetirizine (ZYRTEC) 10 MG tablet Take 1 tablet by mouth daily 6/11/24  Yes Zack Tamez, APRN - NP   TRELEGY ELLIPTA 100-62.5-25 MCG/ACT AEPB inhaler INHALE 1 PUFF EVERY DAY 5/17/24  Yes Rashid Vila MD   doxycycline hyclate (VIBRAMYCIN) 100 MG capsule Take by mouth 4/1/24  Yes ProviderShivani MD   hydrALAZINE (APRESOLINE) 10 MG tablet Take 1 tablet by mouth 3 times daily 5/8/24 8/6/24 Yes Rashid Vila MD   predniSONE (DELTASONE) 5 MG tablet Take 1 tablet by mouth daily 5/8/24  Yes Rashid Vila MD   dexlansoprazole (DEXILANT) 60 MG CPDR delayed release capsule Take once a day for acid reflux 4/17/24  Yes Rashid Vila MD   NIFEdipine (PROCARDIA XL) 90 MG extended release tablet Take 1 tablet by mouth daily 3/6/24  Yes Rashid Vila MD   carvedilol (COREG) 6.25 MG tablet TAKE 1 TABLET BY MOUTH TWICE DAILY ON DAYS OF DIALYSIS 3/5/24  Yes Rashid Vila MD   Capsaicin 0.1 % CREA Apply 1 g topically at bedtime 1/21/24  Yes Homa Ochoa MD   EPINEPHrine (EPIPEN) 0.3 MG/0.3ML SOAJ injection INJECT 0.3 ML INTO THE MUSCLE ONCE AS NEEDED FOR ALLERGIC RESPONSE 12/13/23  Yes Rashid Vila MD   meclizine (ANTIVERT) 25 MG tablet TAKE 1 TABLET THREE

## 2024-07-05 ENCOUNTER — CARE COORDINATION (OUTPATIENT)
Facility: CLINIC | Age: 69
End: 2024-07-05

## 2024-07-05 DIAGNOSIS — I10 ESSENTIAL HYPERTENSION: Primary | ICD-10-CM

## 2024-07-05 DIAGNOSIS — I50.32 CHRONIC DIASTOLIC (CONGESTIVE) HEART FAILURE (HCC): ICD-10-CM

## 2024-07-05 LAB — BACTERIA UR CULT: ABNORMAL

## 2024-07-05 NOTE — CARE COORDINATION
Ambulatory Care Coordination Note     7/5/2024 2:07 PM     Patient Current Location:  Home: [address]  [address] [place]  [address]  [place] [address]     Patient graduated from the High Risk Care Management program on 7/5/2024.  Patient verbalizes confidence in the ability to self-manage at this time. has the ability to self manage at this time. progressing towards self management. .  Care management goals have been completed. No further Ambulatory Care Manager follow up scheduled.      ACM: Jay Mclaughlin RN     Challenges to be reviewed by the provider   Additional needs identified to be addressed with provider No  none               Method of communication with provider: phone.    Care Summary Note: No further Ambulatory Care Management follow-up scheduled at this time.  patient  has Ambulatory Care Manager's contact information for any further questions, concerns or needs.          PCP/Specialist follow up:   Future Appointments         Provider Specialty Dept Phone    10/7/2024 2:00 PM Zack Tamez, APRN - NP Family Medicine [phone]            Follow Up:   No further Ambulatory Care Management follow-up scheduled at this time.  patient  has Ambulatory Care Manager's contact information for any further questions, concerns or needs.

## 2024-07-05 NOTE — PROGRESS NOTES
Remote Patient Order Discontinued    Received request from Jay Mclaughlin RN   to discontinue order for remote patient monitoring of CHF and HTN and order completed.

## 2024-07-05 NOTE — CARE COORDINATION
Patient Jhoana Membreno  07/05/24     Care Coordination  placed call to patient to arrange RPM kit  through [employer]. No Answer     provided return and how to pack equipment in original packing via the patients voicemail if available and provided call back number should patient have questions.    Patient made aware [employer] will  equipment in 2-4 days.

## 2024-07-08 ENCOUNTER — CARE COORDINATION (OUTPATIENT)
Facility: CLINIC | Age: 69
End: 2024-07-08

## 2024-07-08 NOTE — CARE COORDINATION
Received call from patient requesting readmission to CCM due to B/P issues. Message to CCM manager for guidance and awaiting response.   .

## 2024-07-09 ENCOUNTER — TELEPHONE (OUTPATIENT)
Facility: CLINIC | Age: 69
End: 2024-07-09

## 2024-07-09 NOTE — TELEPHONE ENCOUNTER
Pt is calling wanting to know if she could get a refill on antibiotics   Cephalexin 500 mg 2x a day   Guero Almira

## 2024-07-10 ENCOUNTER — CARE COORDINATION (OUTPATIENT)
Facility: CLINIC | Age: 69
End: 2024-07-10

## 2024-07-10 DIAGNOSIS — J44.9 CHRONIC OBSTRUCTIVE PULMONARY DISEASE, UNSPECIFIED COPD TYPE (HCC): ICD-10-CM

## 2024-07-10 DIAGNOSIS — N30.00 ACUTE CYSTITIS WITHOUT HEMATURIA: Primary | ICD-10-CM

## 2024-07-10 DIAGNOSIS — N39.0 RECURRENT UTI: ICD-10-CM

## 2024-07-10 DIAGNOSIS — I10 ESSENTIAL HYPERTENSION: Primary | ICD-10-CM

## 2024-07-10 RX ORDER — CEPHALEXIN 500 MG/1
500 CAPSULE ORAL 2 TIMES DAILY
Qty: 14 CAPSULE | Refills: 0 | Status: SHIPPED | OUTPATIENT
Start: 2024-07-10 | End: 2024-07-17

## 2024-07-10 ASSESSMENT — PATIENT HEALTH QUESTIONNAIRE - PHQ9
SUM OF ALL RESPONSES TO PHQ QUESTIONS 1-9: 0
SUM OF ALL RESPONSES TO PHQ QUESTIONS 1-9: 0
2. FEELING DOWN, DEPRESSED OR HOPELESS: NOT AT ALL
1. LITTLE INTEREST OR PLEASURE IN DOING THINGS: NOT AT ALL
SUM OF ALL RESPONSES TO PHQ QUESTIONS 1-9: 0
SUM OF ALL RESPONSES TO PHQ9 QUESTIONS 1 & 2: 0
SUM OF ALL RESPONSES TO PHQ QUESTIONS 1-9: 0

## 2024-07-10 NOTE — PROGRESS NOTES
Remote Patient Monitoring Treatment Plan    Received request from [facility]/Jay Packer, MICHELLE   to order remote patient monitoring for in home monitoring of COPD and HTN; Condition managed by Dr. Vila, PCP.  and order completed.     Patient will be monitoring blood pressure   pulse ox   survey questions.      Patient will engage in Remote Patient Monitoring each day to develop the skills necessary for self management.       RPM Care Team Responsibilities:   Alerts will be reviewed daily and addressed within 2-4 hours during operational hours (Monday -Friday 9 am-4 pm)  Alert response and intervention documented in patient medical record  Alert response escalated to PCP per protocol and documented in patient medical record  Patient monitored over approximately  days  Discharge from program based on self-management readiness    See care coordination encounters for additional details.

## 2024-07-10 NOTE — CARE COORDINATION
Ambulatory Care Coordination Note     7/10/2024 1:27 PM     Patient Current Location:  Home: [address]  [address] [place]  [address]  [facility] [address]     This patient was received as a referral from the patient (self-referred).    ACM contacted the patient by telephone. Verified name and  with patient as identifiers. Provided introduction to self, and explanation of the ACM role.   Patient accepted care management services at this time.          ACM: Jay Mclaughlin RN     Challenges to be reviewed by the provider   Additional needs identified to be addressed with provider No  none               Method of communication with provider: phone.    Care Summary Note:   Patient enrolled in Complex Case Management effective 7/10/2024 and will be followed per AC protocol. Initial questions answered with subsequent encounters planned.   Further / follow up appointments listed below - reviewed upcoming appointments  Further questions answered as needed and patient has ACM contact information  Initial review of medications with attention to any side effects and determination that patient has sufficient quantity of meds and/or refills.  Initial assessment done with attention to primary illness and / or condition.  Respiratory and cardiac status shows no issues at present    HTN Teaching:        Always take medication as prescribed . Do not skip or stop medication unless specifically instructed to by MD or PCP. If you forget to take a dose, take it as soon as you remember. However, if it is almost time for your next dose, skip the missed dose and go back to your original schedule. Discussed symptoms of HTN - low sodium dietReview of medications with attention to any side effects and determination that patient has sufficient quantity of meds and/or refills.    Advised and encouraged patient to contact PCP in the case of a minor issue instead of going to the ER. Explained to patient that PCP has an

## 2024-07-11 ENCOUNTER — CARE COORDINATION (OUTPATIENT)
Facility: CLINIC | Age: 69
End: 2024-07-11

## 2024-07-11 NOTE — CARE COORDINATION
Ambulatory Care Coordination Note     2024 10:30 AM     Patient Current Location:  Home: [address]  [address] [place]  [address]  [place] [address]     ACM contacted the patient by telephone. Verified name and  with patient as identifiers.         ACM: Jay Mclaughlin RN     Challenges to be reviewed by the provider   Additional needs identified to be addressed with provider No  none    Call placed to inform patient that RPM would be reactivated with close attention to B/P.           Method of communication with provider: phone.    Care Summary Note:   Explained to patient to variation in B/P due to her being on dialysis. Stated that her \"normal\" may be different due to her body's reaction to kidney disease and dialysis. Explained that we will follow her B/P for a period and adjust as necessary.    Offered patient enrollment in the Remote Patient Monitoring (RPM) program for in-home monitoring: Yes, patient enrolled; current status is preactivated  .RPM team aware of activation needed.     Assessments Completed:   No changes since last call    Medications Reviewed:   Patient denies any changes with medications and reports taking all medications as prescribed.    Advance Care Planning:   Reviewed and current     Care Planning:    Goals Addressed    None          PCP/Specialist follow up:   Future Appointments         Provider Specialty Dept Phone    10/7/2024 2:00 PM Zack Tamez, APRN - NP Family Medicine [phone]            Follow Up:   Plan for next AC outreach in approximately 1 week to complete:  - disease specific assessments  - medication review   - goal progression  - education   - RPM.   Patient  is agreeable to this plan.

## 2024-07-16 ENCOUNTER — TRANSCRIBE ORDERS (OUTPATIENT)
Facility: HOSPITAL | Age: 69
End: 2024-07-16

## 2024-07-16 DIAGNOSIS — R06.02 SHORTNESS OF BREATH: Primary | ICD-10-CM

## 2024-07-19 ENCOUNTER — CARE COORDINATION (OUTPATIENT)
Facility: CLINIC | Age: 69
End: 2024-07-19

## 2024-07-19 NOTE — CARE COORDINATION
all medications as ordered   On track             PCP/Specialist follow up:   Future Appointments         Provider Specialty Dept Phone    9/10/2024 2:00 PM [facility] Cardiology [phone]    10/7/2024 2:00 PM Zack Tamez APRN - NP Family Medicine [phone]            Follow Up:   Plan for next ACM outreach in approximately 1 week to complete:  - medication review   - goal progression  - education .   Patient  is agreeable to this plan.

## 2024-07-22 ENCOUNTER — CARE COORDINATION (OUTPATIENT)
Facility: CLINIC | Age: 69
End: 2024-07-22

## 2024-07-22 VITALS — BODY MASS INDEX: 28.32 KG/M2 | HEIGHT: 61 IN | WEIGHT: 150 LBS

## 2024-07-22 DIAGNOSIS — I10 ESSENTIAL HYPERTENSION: Primary | ICD-10-CM

## 2024-07-22 DIAGNOSIS — J44.9 CHRONIC OBSTRUCTIVE PULMONARY DISEASE, UNSPECIFIED COPD TYPE (HCC): ICD-10-CM

## 2024-07-22 NOTE — ED NOTES
[facility] called to make aware of patient death. Spoke with Asif Puri, she states patient is not suitable for tissue donation but eye bank will call separately.

## 2024-07-22 NOTE — PROGRESS NOTES
Remote Patient Order Discontinued    Received request from Jay Mclaughlin RN   to discontinue order for remote patient monitoring of COPD and HTN and order completed.

## 2024-07-22 NOTE — ED NOTES
[facility] bank called and state patient can be a candidate. Prep eyes and send patient to [facility].

## 2024-07-22 NOTE — ED TRIAGE NOTES
Patient arrived via EMS with CPR in progress with the Jarod. EMS states patient is being treated for a UTI and she went to get out of bed and her family heard a scream they called 911 and when EMS arrived patient was pulseless and CPR started. Per EMS patient was given Epi x 8 , Amiodarone 300mg and 150mg, and Calcium Chloride.18g EJ in place and BVM is used with IGEL.

## 2024-07-22 NOTE — ED PROVIDER NOTES
EMERGENCY DEPARTMENT HISTORY AND PHYSICAL EXAM      Patient Name: Jhoana Membreno  MRN: [id number]  YOB: 1955  Provider: Zehra Arvizu DO  PCP: Rashid Vila MD     History of Presenting Illness     Chief Complaint   Patient presents with    Cardiac Arrest       History Provided By: EMS     HPI: Jhoana Membreno, 69 y.o. female with a history of ESRD on dialysis Monday, Wednesday, Friday, COPD, frequent UTIs, hypertension, hyperlipidemia, hypothyroidism, obesity, osteoporosis, pancreatitis, GERD, renal transplant presents to the ED via EMS in cardiac arrest.  Per EMS, a call was made to 911 at 1925 for unresponsive patient.  Patient's daughter and son-in-law heard a thud in patient's room and found her unresponsive and not breathing at 1925, they arrived shortly after and found patient in PEA, immediately started ACLS protocol.    Per EMS, patient was given 8 rounds of epi, shocked 5 times, and given 2 doses of amiodarone, a dose of calcium chloride.  She has an IV in place and a supraglottic airway.  The mechanical CPR device is doing compressions.  Upon arrival to the ER she has been coding for over 40 minutes, almost 50 minutes as her downtime was before 1925.    Per medical record review, PCP management by Zack Tamez NP/Rashid Vila MD. Additionally patient was just enrolled in remote patient monitoring. Well known patient to this ER.     Past History     Past Medical History:  Past Medical History:   Diagnosis Date    JULIET (acute kidney injury) ([facility]) 05/09/2019    Anxiety     Arrhythmia     Arthritis     Asthma     Asthma     Burning with urination     frequent uti    Calculus of gallbladder with acute cholecystitis without obstruction 10/09/2020    Cholecystitis 01/12/2019    Chronic kidney disease     dialysis    CMV (cytomegalovirus) antibody positive     Colitis 09/10/2022    COPD (chronic obstructive pulmonary disease) ([facility])     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    Dialysis patient

## 2024-07-22 NOTE — ED NOTES
Pulse check completed, Patient remains in PEA, no palpable pulse. Per Dr. Arvizu, all efforts terminated.

## 2024-09-10 NOTE — PROGRESS NOTES
PT Evaluation     Today's date: 2024  Patient name: Peace Anderson  : 1963  MRN: [id number]  Referring provider: Arti Luna DO  Dx:   Encounter Diagnosis     ICD-10-CM    1. Acute upper back pain  M54.9       2. Acute right-sided low back pain without sciatica  M54.50 Ambulatory Referral to Physical Therapy      3. Cervicogenic headache  G44.86       4. Hematuria, unspecified type  R31.9 Ambulatory Referral to Physical Therapy          Start Time:   Stop Time:   Total time in clinic (min): 60 minutes    Assessment  Impairments: abnormal coordination, abnormal muscle firing, abnormal muscle tone, abnormal movement, activity intolerance, impaired physical strength, lacks appropriate home exercise program, pain with function, poor posture , poor body mechanics, participation limitations, activity limitations and endurance  Symptom irritability: high    Assessment details: Peace Anderson is a pleasant 61 y.o. female with a referred dx of acute right-sided low back pain without sciatica from Arti Luna DO.  Patient also presents today with further concerns of cervicogenic headaches and upper thoracic pain which were addressed during today's visit. Upon further clinical testing, patient presents with the following deficits: pain with all cervical movements, tenderness to soft tissue and PAVIM palpation, poor postural positioning/endurance/strength, cervical active and passive motor dysfunction, high muscle guarding, and high tissue irritability. These deficits and impairments result in significant pain which decreases quality of life, well-being and sleep hygiene. Patient has noted a decrease in her functional capacity, especially with ADLs and work duties.     Patient introduced to the topic of pain neuroscience education and that improving knowledge of how pain works promotes improved recovery and rehabilitation. Current knowledge and understanding of patient on pain related topics was  Sent [email or web address] message asking her to call the office back. explored to create baseline. Pt was provided with a basic HEP focused on joint proprioception and graded DNF training which will be reviewed in the upcoming session. Pt able to demonstrate HEP with good technique and no pain. Educated pt to stop any exercises causing pain increase, pt verbalizes understanding. Pt was educated on anatomy and physiology of diagnosis and demonstrated verbal understanding. Positive prognostic indicators include good understanding of diagnosis and treatment plan options and acuity of symptoms. Negative prognostic indicators include chronicity of symptoms, anxiety, depression, hypertension, high symptom irritability, central sensitization, minimal changes in pain with movement, multiple concurrent orthopedic problems, multiple prior failed treatments, obesity, dependency on medications, and comorbidites. Pt would benefit from skilled OP physical therapy to address these, and the below impairments in order to optimize outcomes and promote return to functional baseline.     Patient verbalized understanding of POC.    Please contact me if you have any questions or recommendations. Thank you for the referral and the opportunity to share in Peace's care      Barriers to intervention: behavior and medical complexity  Understanding of Dx/Px/POC: good     Prognosis: fair    Goals  Short Term Goals:  In 2-3 weeks, the patient will:  1. Pt will report at least a 25% reduction in subjective pain complaints/symptoms to better manage ADLs and household chores.   2. Pt will demonstrate improvement in postural strength/endurance and body schema  3. Pt independent with initial HEP, rationale, technique and frequency, for ROM and pain control.      Long Term Goals:  In 8-10 weeks, the patient will:  1. Pt will have WNL postural and UE strength with no pain with cervical/shoulder movement  2. FOTO to greater than predicted value.  3. Independent with comprehensive HEP upon discharge.  4. Pt will be able  to perform ADLs, iADLS, and household duties with minimal restriction indicating return to PLOF.  5. Pt independent with rationale, technique and plan for performance of advanced HEP to ensure independent self-management of symptoms upon discharge.      Plan  Patient would benefit from: PT eval and skilled physical therapy  Referral necessary: No  Planned modality interventions: cryotherapy, biofeedback and TENS    Planned therapy interventions: activity modification, joint mobilization, manual therapy, massage, behavior modification, body mechanics training, muscle pump exercises, neuromuscular re-education, patient education, postural training, community reintegration, self care, sensory integrative techniques, strengthening, stretching, therapeutic activities, therapeutic exercise, therapeutic training, home exercise program, graded motor, graded exercise, functional ROM exercises, patient/caregiver education, graded activity and IASTM    Frequency: 1-2x week  Plan of Care beginning date: 9/11/2024  Plan of Care expiration date: 11/20/2024  Treatment plan discussed with: patient        Subjective Evaluation    History of Present Illness  Mechanism of injury: Patient presents for PT initial evaluation regarding concerns of acute low back pain and upper thoracic/neck pain. Patient initially went to ED due to concerns of flank/lower back pain being related to potential kidney stones (8/25). Patient has been following up with PCP regarding to found nonobstructive small renal caliculi during CT scan in ED. Patient was treated for pain in ED, but once medication wore off, pain returned soon after. Upon onset of symptoms, she later began to have upper thoracic/neck pain with correlating cervicogenic headaches which resulted her returning to ED on 9/9/24. Since then, she has stopped all narcotics/pain medications and mostly been using Tylenol as needed. Patient did try heat last night which did decrease the pain to allow  her to fall asleep. Lower back pain has been intermittent since and cervicothoracic is constant and intense in nature. Both are not exacerbating by any specific movements or have any JIM to provoke symptoms.     Denies numbnes and tingling. Was cleared for red flags in ED and PCP office.     Aggravating Factors: everything  Relieving Factors: heat    Personal Functional Goals: to reduce pain            Recurrent probem    Quality of life: good    Patient Goals  Patient goals for therapy: increased strength, independence with ADLs/IADLs, return to sport/leisure activities, improved balance, decreased pain and increased motion    Pain  Current pain rating: 10  At best pain ratin  At worst pain rating: 10  Location: Back  Quality: dull ache and discomfort  Relieving factors: medications, relaxation, rest, heat and support    Social Support  Lives with: adult children    Employment status: working  Hand dominance: left  Exercise history: None  Life stress: Mod-High      Diagnostic Tests  No diagnostic tests performed  Treatments  Previous treatment: medication        Objective    Postural Findings:              Head Position x Protracted  Neutral  Retracted   Scapular Position  Protracted x Neutral  Retracted   Thoracic Spine  Inc Kyphosis x Neutral         UE Myotomes:  Nerve Root Test Action RIGHT:   2024     LEFT:   2024         C3 Neck side flexion intact intact   C4 Shoulder elevation intact intact   C5 Shoulder abduction intact intact   C6 Elbow Flexion and wrist extension intact intact   C7 Elbow extension and wrist flexion intact intact   C8 Thumb extension and ulnar deviation intact intact   T1 Hand intrinsics intact intact     UE Dermatomes  Nerve Root Test Action RIGHT:   2024 LEFT:   2024       C2 Quaker/Occipital Protuberance intact intact   C3 Lateral Neck intact intact   C4 Clavicle/Upper Trap intact intact   C5 Deltoid to Lateral Epicondyle intact intact   C6 Thumb intact intact    C7 Index Finger intact intact   C8 5th Digit intact intact   T1 Medial Antecubital Fossa/Forearm intact          intact         Cervical Vascular Screenin-D's   Dizziness   Diploplia   Drop Attacks   Dysphagia   Dysarthria  3-N's   Nausea   Nystagmus    Numbness  The above were all  Negative      Upper Cervical Ligament Testing:   Transverse ligament stress test   Alar Ligament stress test   Sharp-Glenda   *The above were all  Negative    Strength and ROM evaluated B from a regional biomechanical perspective and values relevant to this episode recorded in tables below    Range of Motion measurements for the Cervical Spine (in degrees)     Joint Motion Right:   2024   Left:   2024     Flexion WNL P! -   Extension WNL P! -   Rotation WNL P!    Lateral Flexion WNL P!    Protraction WNL P!    Retraction WNL P!      UE Manual Muscle Testing  Test Action RIGHT   2024   LEFT   2024     Shoulder flexion 3+/5 P! 3+/5 P!   Shoulder abduction 3+/5 P! 3+/5 P!   Mid trap 3/5 3/5   Low trap 3/5 3/5       Additional Clinical Tests:  Palpation: tenderness to palpation CTJ, P! With PAVIMs T1-T4  Joint Mobility: WNL, apprehensive          Precautions: HTN, Venous Insufficiency, Asthma, Impaired Fasting Glucose, Hyperparathyroidism, Anxiety/Depression, Anemia, Morbid Obesity, SAADIA      POC expires Unit limit Auth Expiration date PT/OT + Visit Limit?   24 4 24 58 Visits         Visit/Unit Tracking  AUTH Status:  Date         58 Visits Used 1         Remaining  57           Pertinent Findings:      POC End Date: 24                                                                                          Test / Measure  24   FOTO (Predicted 67) 45   Postural Deficits +   Painful Cervical AROM +   DNF Weakness +      Access Code: [email or web address]  URL: [email or web address]  Date: 2024  Prepared by: Karen Arana    Exercises  - Seated Assisted Cervical Rotation with Towel  -  1-3 x daily - 7 x weekly - 1 sets - 10 reps - 5-10 second hold  - Cervical Extension AROM with Strap  - 1-3 x daily - 7 x weekly - 1 sets - 10 reps - 10 seconds hold  - Supine Chin Tuck  - 1-2 x daily - 7 x weekly - 1-2 sets - 10 reps - 5 seconds hold  - Seated Cervical Retraction  - 1-2 x daily - 7 x weekly - 1-2 sets - 10 reps - 5 seconds hold    Manuals 9/11            Cervical PROM             STM or IASTM along cevical paraspinals                                       Neuro Re-Ed             Supine Chin Tucks             Prone Press-up             Prone Prop Cervical Extension             SNAGs             SL ER             SL Open Books             Prone I-Y-S             Shrugs             Standing Rows             Ther Ex             HEP/Patient Education/PNE AR 38'             UBE/Treadmill Walking             Levator Scap/UT Stretch             Thread the Needle Stretch                                                                              Ther Activity                                       Gait Training                                       Modalities

## 2024-10-09 NOTE — FLOWSHEET NOTE
03/06/23 0700   Handoff   Communication Given Shift Handoff   Handoff Given To Batsheva Urias RN   Handoff Received From YESENIA Velasquez RN   Handoff Communication Face to Face; At bedside   Time Handoff Given 0700   End of Shift Check Performed Yes Number Of Photographs Reviewed: 1 Review Findings: no new or changing lesions Detail Level: Simple

## 2025-04-02 NOTE — PROGRESS NOTES
1415- Received care of pt. Pt off the floor for dialysis. 1705- Pt returned from dialysis. Assisted to bathroom. Set up for dinner. 1812- Assisted back to bathroom. NO complaints of pain. Scheduled meds given per MAR. Physical Therapy    Visit Type: initial evaluation  SUBJECTIVE  Patient verbally agrees to allow the following to be present during session: significant other (Joseph)    Pt just returning from the bathroom with support from significant other at writer's arrival. Agreeable to therapy session. States pain improved, but remains high.   Patient / Family Goal: return home    Pain   RN informed on pain level.    Location: lower abdomen/incisional    At onset of session (out of 10): 8     OBJECTIVE     Cognitive Status   Orientation    - Oriented to: person, place and time  Functional Communication   - Overall Communication Status: within functional limits   - Forms of Communication: verbal    Patient Activity Tolerance: 1 to 1 activity to rest    Incision/Wound:   - Location: Abdomen, covered by dressing, not observed, RN managing         Range of Motion (ROM)   (degrees unless noted; active unless noted; norms in ( ); negative=lacking to 0, positive=beyond 0)  WFL: LLE, RLE    Strength  (out of 5 unless noted, standard test position unless noted)   WFL: LLE, RLE      Sitting Balance  (PANCHO = base of support)  Static      - Trial 1 details: with double UE support, with double LE support and modified independent  Dynamic      - Trial 1 details: with double LE support, with double UE support and supervision    Standing Balance  (PANCHO = base of support)  Firm Surface: Double Leg      - Static, Eyes Open       - Trial 1 details: with double UE support and supervision     - Dynamic, Eyes Open       - Trial 1 details: with double UE support, supervision and stand by assist  Santosh UE support of 2WW for standing balance        Bed Mobility    Pt sitting at start and end of session   Transfers  Assistive devices: gait belt, 2-wheeled walker  - Sit to stand: stand by assist, with verbal cues  - Stand to sit: stand by assist, with verbal cues  Cues for hand placement, breathing throughout for pain management. No unsteadiness or loss of  balance noted.     After mobility, pt able to stand from recliner without assistive device and maintain standing while writer adjusted pt's abdominal binder - no loss of balance, however, increased pain    Ambulation / Gait  - Assistive device: gait belt and 2-wheeled walker  - Distance (feet unless otherwise indicated): 120, 30  - Assist Level: stand by assist and with verbal cues  - Description: decreased step length left, decreased step length right, decreased libby/pace and narrow base of support    Cues for breathing for pain management, walker proximity, upright posture as able. No overt loss of balance noted, pt reports feeling unsteady however. Several standing rest breaks throughout with pt preference to lean on walker with forearm support. Slow pace with increased pain (\"burning\" lower right)  Ambulation / Gait (additional trials)  - Assist Level: contact guard/touching/steadying assist and with verbal cues  - Assistive device: gait belt and none  - Distance (feet unless otherwise indicated): 10  - Description: decreased libby/pace, decreased step length left, decreased step length right, narrow base of support and forward flexed at hips  Trial short distance without assistive device as pt was using walker with one hand and support her lower abdomen with the other. No overt loss of balance or unsteadiness noted without assistive device, however, pt continues to prefer use of 2WW at this time.       Interventions     Training provided: transfer training, functional ambulation, activity tolerance, bed mobility training, use of assistive device, safety training, compensatory techniques, positioning, gait training and breathing/relaxation    Skilled input: As detailed above, verbal instruction/cues and tactile instruction/cues  Verbal Consent: Writer verbally educated and received verbal consent for hand placement, positioning of patient, and techniques to be performed today from patient for clothing  adjustments for techniques as described above and how they are pertinent to the patient's plan of care.         Education:   - Present and ready to learn: patient, patient's significant other and with patient present  Education provided during session:  - bed mobility techniques, role of PT, use of assistive device, pain management, icing, positioning, gait training, compensatory techniques and transfer technique  - Results of above outlined education: Demonstrates understanding and Verbalizes understanding    ASSESSMENT   Patient will benefit from skilled therapy to address listed impairments and functional limitations.    Discharge needs based on today's assessment:   - Current level of function: significantly below baseline level of function   - Therapy needs at discharge: does not require ongoing therapy   - Activities of daily living (ADLs) requiring support at discharge: bed mobility, transfers, ambulation and stairs   - Impairments that require further therapy intervention: activity tolerance, strength and pain    AM-PAC  - Generalized Prior Level of Function: IND/MOD I ([facility] 22-24)       Key: MOD A=moderate assistance, IND/MOD I=independent/modified independent  - Generalized Current Level of Function     - Current Mobility Score: 17       AM-PAC Scoring Key= >21 Modified Independent; 20-21 Supervision; 18-19 Minimal assist; 13-17 Moderate assist; 9-12 Max assist; <9 Total assist        Predicted patient presentation: Low (stable) - Patient comorbidities and complexities, as defined above, will have little effect on progress for prescribed plan of care.    Pain at End of Session: RN informed on pain level  Pain: 8/10, location: abdomen    PLAN (while hospitalized)  Suggestions for next session as indicated: Progress ambulation ( wean assistive device as able), bed mobility ( log roll), trial stairs as appropriate (nida, 4 VICTOR M david railings)    PT Frequency: 3-5 x per week      PT/OT Mobility Equipment for  Discharge: pt would like 2WW at d/c     Interventions: strengthening, safety education, patient/family training, gait training, compensatory technique education, balance, bed mobility, HEP train/position, equipment eval/education, stairs retraining and functional transfer training  Agreement to plan and goals: patient agrees with goals and treatment plan        GOALS  Review Date: 4/9/2025  Long Term Goals: (to be met by time of discharge from hospital)  Sit to supine: Patient will complete sit to supine modified independent.  Status: not met  Supine to sit: Patient will complete supine to sit modified independent.  Status: not met  Sit to stand: Patient will complete sit to stand transfer with modified independent.   Status: progressing/ongoing  Sit stand device: least restrictive device.Stand to sit: Patient will complete stand to sit transfer with modified independent.   Status: progressing/ongoing  Stand to sit device: least restrictive device.Car transfer: Patient will complete car transfer with least restrictive device, modified independent.  Status: not met  Ambulation (even): Patient will ambulate on even surface for 350 feet with modified independent.   Status: progressing/ongoing  Even device: least restrictive device.Curb step: Patient will ambulate curb step with least restrictive device, modified independent.   Status: not met  3-4 steps: Patient will ambulate 3-4 steps with least restrictive device modified independent.   Status: not met  Documented in the chart in the following areas: Prior Function. Assessment/Plan.      Patient at End of Session:   Location: in chair  Safety measures: call light within reach and lines intact  Handoff to: nurse      Therapy procedure time and total treatment time can be found documented on the Time Entry flowsheet

## (undated) DEVICE — STERILE POLYISOPRENE POWDER-FREE SURGICAL GLOVES: Brand: PROTEXIS

## (undated) DEVICE — LIGHT HANDLE COVER: Brand: UNBRANDED

## (undated) DEVICE — 3M™ IOBAN™ 2 ANTIMICROBIAL INCISE DRAPE 6650EZ: Brand: IOBAN™ 2

## (undated) DEVICE — Device: Brand: MEDEX

## (undated) DEVICE — TUBING, SUCTION, 9/32" X 10', STRAIGHT: Brand: MEDLINE

## (undated) DEVICE — BAG DRN WSTE 48X72IN 1400ML -- MERIT DISPOSABLE DEPOT

## (undated) DEVICE — SOLUTION IRRIG 1000ML STRL H2O USP PLAS POUR BTL

## (undated) DEVICE — KIT COLON W/ 1.1OZ LUB AND 2 END

## (undated) DEVICE — 3M™ STERI-DRAPE™ SMALL DRAPE WITH ADHESIVE APERTURE 1092 25/BX,4/CS&#X20;: Brand: STERI-DRAPE™

## (undated) DEVICE — SYR MED LUER LCK 10ML PUR -- MEDALLION

## (undated) DEVICE — (D)SYR LR LCK 1ML GRAD NSAF 20 -- DISC USE ITEM 364894

## (undated) DEVICE — SET EPI 18GA L3.5IN TUOHY NDL W/ 20GA CLS TIP NYL CATH

## (undated) DEVICE — CONMED SCOPE SAVER BITE BLOCK, 20X27 MM: Brand: SCOPE SAVER

## (undated) DEVICE — Device

## (undated) DEVICE — 3-0 COATED VICRYL PLUS UNDYED 1X27" SH --

## (undated) DEVICE — ESOPHAGEAL/COLONIC WIREGUIDED BALLOON DILATATION CATHETER: Brand: CRE WIREGUIDED

## (undated) DEVICE — WASH CLOTH INCONT LO LINT PREM 12X13 IN LF DISP

## (undated) DEVICE — SYRINGE ANGIO 10ML WHT PLUNG CLR POLYCARB BRL FIX M LUER

## (undated) DEVICE — CATH 5F 100CM JL35 -- DXTERITY

## (undated) DEVICE — GUIDE COR SNUS L135CM DIA0.035IN SHTH L50CM DIA9FR BRAID

## (undated) DEVICE — 3M™ TEGADERM™ TRANSPARENT FILM DRESSING FRAME STYLE, 1626W, 4 IN X 4-3/4 IN (10 CM X 12 CM), 50/CT 4CT/CASE: Brand: 3M™ TEGADERM™

## (undated) DEVICE — CARDINAL HEALTH LAP SPONGE  4 X 18 IN. UNWASHED X-RAY DETECTABLE SOFTPACK: Brand: CARDINAL HEALTH

## (undated) DEVICE — TUBING, SUCTION, 3/16" X 10', SCALLOP: Brand: MEDLINE

## (undated) DEVICE — SURGICAL PROCEDURE TRAY CRD CATH 3 PRT

## (undated) DEVICE — 3M™ STERI-STRIP™ REINFORCED ADHESIVE SKIN CLOSURES, R1547, 1/2 IN X 4 IN (12 MM X 100 MM), 6 STRIPS/ENVELOPE: Brand: 3M™ STERI-STRIP™

## (undated) DEVICE — INTRODUCER SHTH L13CM OD7FR SH ORNG HUB SEAMLESS SAFSHTH

## (undated) DEVICE — SOLUTION IRRIG 500ML STRL H2O NONPYROGENIC

## (undated) DEVICE — COVER PRB INTOP 96X6 IN LF

## (undated) DEVICE — ENDOGATOR TUBING FOR BOSTON SCIENTIFIC ENDOSTAT II PUMP, OLYMPUS OFP PUMP OR ENDO STRATUS PUMP: Brand: ENDOGATOR

## (undated) DEVICE — CABLE PACE ALGTR CLP SAF 12FT --

## (undated) DEVICE — BOWL MED M 16OZ PLAS CAP GRAD

## (undated) DEVICE — GDWIRE WHISPER HITORQ EDS CSJ -- ACUITY SOLD BY BX ONLY 4648

## (undated) DEVICE — SUTURE ETHBND EXCEL SZ 0 L30IN NONABSORBABLE GRN L26MM CT-2 X412H

## (undated) DEVICE — DRESSING HEMSTAT W4XL4IN 4 PLY WHT IMPREG KAOLIN HYDRPHLC

## (undated) DEVICE — FCPS RAD JAW 4 SC 240CM W/NDL --

## (undated) DEVICE — SPONGE GZ W4XL4IN COT 12 PLY TYP VII WVN C FLD DSGN

## (undated) DEVICE — SUT VCRL + 2-0 27IN CT1 UD --

## (undated) DEVICE — 1200CC GUARDIAN II: Brand: GUARDIAN

## (undated) DEVICE — JELLY,LUBE,STERILE,FLIP TOP,TUBE,4-OZ: Brand: MEDLINE

## (undated) DEVICE — Device: Brand: DEFENDO VALVE AND CONNECTOR KIT

## (undated) DEVICE — BANDAGE ADH W0.75XL3IN UNIV WVN FAB NAT GEN USE STRP N ADH

## (undated) DEVICE — PAD,PREPPING,CUFFED,24X48,7",NONSTERILE: Brand: MEDLINE

## (undated) DEVICE — ADAPTER IV TBNG CLR POLYCARB DBL M LUERLOCK

## (undated) DEVICE — CATH 5F 100CM JR40 -- DXTERITY

## (undated) DEVICE — DERMABOND SKIN ADH 0.7ML -- DERMABOND ADVANCED 12/BX

## (undated) DEVICE — TUBING INSUFFLATION CAP W/ EXT CARBON DIOX ENDO SMARTCAP

## (undated) DEVICE — PACER PACK: Brand: MEDLINE INDUSTRIES, INC.

## (undated) DEVICE — 3M™ TEGADERM™ TRANSPARENT FILM DRESSING FRAME STYLE, 1627, 4 IN X 10 IN (10 CM X 25 CM), 20/CT 4CT/CASE: Brand: 3M™ TEGADERM™

## (undated) DEVICE — ZIP 8I SURGICAL SKIN CLOSURE DEVICE: Brand: ZIP 8I SURGICAL SKIN CLOSURE DEVICE

## (undated) DEVICE — COVER,MAYO STAND,STERILE: Brand: MEDLINE

## (undated) DEVICE — SLING ARM XL L20.25IN D9IN COT POLY BILAT WEB SHLDR STRP

## (undated) DEVICE — DRAPE EQUIP C ARM 74X42 IN MOB XR W/ TIE RUBBER BND LF

## (undated) DEVICE — TOWEL,OR,DSP,ST,BLUE,DLX,6/PK,12PK/CS: Brand: MEDLINE

## (undated) DEVICE — SYR 10ML LUER LOK 1/5ML GRAD --

## (undated) DEVICE — INTRO SHTH HEMO 9FR 18G 13CM -- PRELUDE SNAP PEEL-AWAY

## (undated) DEVICE — SINGLE-USE BIOPSY FORCEPS: Brand: RADIAL JAW 4

## (undated) DEVICE — GLIDESHEATH SLENDER STAINLESS STEEL KIT: Brand: GLIDESHEATH SLENDER

## (undated) DEVICE — MEDI-TRACE CADENCE ADULT, DEFIBRILLATION ELECTRODE -RTS  (10 PR/PK) - PHYSIO-CONTROL: Brand: MEDI-TRACE CADENCE

## (undated) DEVICE — MICROPUNCTURE INTRODUCER SET SILHOUETTE TRANSITIONLESS PUSH-PLUS DESIGN - STIFFENED CANNULA WITH STAINLESS STEEL WIRE GUIDE: Brand: MICROPUNCTURE

## (undated) DEVICE — CONTAINER,SPECIMEN,PNEU TUBE,4OZ,OR STRL: Brand: MEDLINE

## (undated) DEVICE — GUIDEWIRE VASC L260CM DIA0.035IN TIP L3MM STD EXCHG PTFE J